# Patient Record
Sex: FEMALE | Race: WHITE | NOT HISPANIC OR LATINO | Employment: OTHER | URBAN - METROPOLITAN AREA
[De-identification: names, ages, dates, MRNs, and addresses within clinical notes are randomized per-mention and may not be internally consistent; named-entity substitution may affect disease eponyms.]

---

## 2019-08-12 ENCOUNTER — TELEPHONE (OUTPATIENT)
Dept: GASTROENTEROLOGY | Facility: CLINIC | Age: 74
End: 2019-08-12

## 2019-09-12 ENCOUNTER — TELEPHONE (OUTPATIENT)
Dept: GASTROENTEROLOGY | Facility: MEDICAL CENTER | Age: 74
End: 2019-09-12

## 2019-09-12 ENCOUNTER — OFFICE VISIT (OUTPATIENT)
Dept: GASTROENTEROLOGY | Facility: CLINIC | Age: 74
End: 2019-09-12
Payer: MEDICARE

## 2019-09-12 VITALS
SYSTOLIC BLOOD PRESSURE: 110 MMHG | HEIGHT: 64 IN | TEMPERATURE: 97.2 F | DIASTOLIC BLOOD PRESSURE: 64 MMHG | WEIGHT: 130 LBS | BODY MASS INDEX: 22.2 KG/M2 | HEART RATE: 82 BPM

## 2019-09-12 DIAGNOSIS — R13.19 ESOPHAGEAL DYSPHAGIA: ICD-10-CM

## 2019-09-12 DIAGNOSIS — R10.13 EPIGASTRIC PAIN: Primary | ICD-10-CM

## 2019-09-12 DIAGNOSIS — R19.7 DIARRHEA, UNSPECIFIED TYPE: ICD-10-CM

## 2019-09-12 DIAGNOSIS — Z12.11 ENCOUNTER FOR SCREENING COLONOSCOPY: Primary | ICD-10-CM

## 2019-09-12 PROCEDURE — 99205 OFFICE O/P NEW HI 60 MIN: CPT | Performed by: INTERNAL MEDICINE

## 2019-09-12 RX ORDER — LEVOTHYROXINE SODIUM 112 UG/1
TABLET ORAL
Refills: 4 | COMMUNITY
Start: 2019-08-27

## 2019-09-12 RX ORDER — POLYETHYLENE GLYCOL 3350 17 G/17G
POWDER, FOR SOLUTION ORAL
Qty: 238 G | Refills: 0 | Status: SHIPPED | OUTPATIENT
Start: 2019-09-12 | End: 2019-09-30 | Stop reason: ALTCHOICE

## 2019-09-12 RX ORDER — POLYETHYLENE GLYCOL 3350 17 G/17G
POWDER, FOR SOLUTION ORAL
Qty: 238 G | Refills: 0 | Status: SHIPPED | OUTPATIENT
Start: 2019-09-12 | End: 2019-09-12 | Stop reason: CLARIF

## 2019-09-12 RX ORDER — TRAZODONE HYDROCHLORIDE 50 MG/1
150 TABLET ORAL
Refills: 0 | COMMUNITY
Start: 2019-07-22

## 2019-09-12 RX ORDER — CLONAZEPAM 1 MG/1
TABLET ORAL
Refills: 0 | Status: ON HOLD | COMMUNITY
Start: 2019-06-19 | End: 2022-08-09 | Stop reason: SDUPTHER

## 2019-09-12 RX ORDER — DOXYCYCLINE HYCLATE 50 MG/1
CAPSULE, GELATIN COATED ORAL
Refills: 3 | COMMUNITY
Start: 2019-08-01 | End: 2020-10-09 | Stop reason: SDUPTHER

## 2019-09-12 NOTE — H&P (VIEW-ONLY)
[facility] - Outpatient Consultation  Daisy Shea 66 y o  female MRN: [id number]  Encounter: [id number]          ASSESSMENT AND PLAN:      1  Epigastric pain  2  Esophageal dysphagia  3  Diarrhea, unspecified type  Patient with history of gastric bypass presents with dysphagia and burning in her stomach  She also has diarrhea and loose, watery stool  This is especially worse when she eats a fatty meal   There is concern of microscopic/collagenous colitis versus pancreatic insufficiency  Will proceed with bidirectionalendoscopy along with check a lipase for pancreatic insufficiency    - EGD; Future  - Colonoscopy; Future  - Lipase; Future  ______________________________________________________________    HPI:  Daisy Shea is a 66y o  year old female who presents to the office for evaluation of dysphagia  This is primarily with solids, she has to cut her food up in small pieces  She tolerates softer foods well  Her stomach will burn if she eats anything with high fat content  She had gastric bypass surgery at [facility]  She is not sure what type of surgery it was  She does have a hoarse voice at the moment  She has been feeling   She had an EGD before and she had a 'very swollen' stomach at the time  She went to [facility] ED about 2 years ago and she was put on IV pain medications and tests showed very inflamed stomach lining  She does have hypothyroidism for which she is on levothyroxine  She has been losing her sense of taste and has decreased appetite  She is limited to what she eats: she eats a lot of yogurts, fresh fruits, some meat (doesn't always like the smell)  She has lost about 14 lbs in the past year  She also gets back pains at times, usually related to not going to the bowel movement for several days  She does have alternating constipation and diarrhea  She will take Miralax to clean herself out    Last colonoscopy was over 8 years ago  REVIEW OF SYSTEMS:    CONSTITUTIONAL: Denies any fever, chills, rigors, + weight loss and appetite  HEENT: No earache or tinnitus  Denies hearing loss or visual disturbances  CARDIOVASCULAR: No chest pain or palpitations  RESPIRATORY: Denies any cough, hemoptysis, shortness of breath or dyspnea on exertion  GASTROINTESTINAL: As noted in the History of Present Illness  GENITOURINARY: No problems with urination  Denies any hematuria or dysuria  NEUROLOGIC: No dizziness or vertigo, denies headaches  MUSCULOSKELETAL: Denies any muscle or joint pain  SKIN: Denies skin rashes or itching  ENDOCRINE: Denies excessive thirst  Denies intolerance to heat or cold  PSYCHOSOCIAL: Denies depression or anxiety  Denies any recent memory loss  Historical Information   History reviewed  No pertinent past medical history  Past Surgical History:   Procedure Laterality Date    GASTRIC BYPASS       Social History   Social History     Substance and Sexual Activity   Alcohol Use Yes    Comment: rare     Social History     Substance and Sexual Activity   Drug Use Never     Social History     Tobacco Use   Smoking Status Former Smoker   Smokeless Tobacco Never Used     History reviewed  No pertinent family history  Meds/Allergies       Current Outpatient Medications:     clonazePAM (KlonoPIN) 1 mg tablet    ferrous gluconate (FERGON) 324 mg tablet    levothyroxine 112 mcg tablet    traZODone (DESYREL) 50 mg tablet    No Known Allergies        Objective     Blood pressure 110/64, pulse 82, temperature (!) 97 2 °F (36 2 °C), temperature source Tympanic, height 5' 4" (1 626 m), weight 59 kg (130 lb)  Body mass index is 22 31 kg/m²  PHYSICAL EXAM:      General Appearance:   Alert, cooperative, no distress   HEENT:   Normocephalic, atraumatic, anicteric       Neck:  Supple, symmetrical, trachea midline   Lungs:   Clear to auscultation bilaterally; no rales, rhonchi or wheezing; respirations unlabored    Heart[de-identified]   Regular rate and rhythm; no murmur, rub, or gallop  Abdomen:   Soft, non-tender, non-distended; normal bowel sounds; no masses, no organomegaly    Genitalia:   Deferred    Rectal:   Deferred    Extremities:  No cyanosis, clubbing or edema    Pulses:  2+ and symmetric    Skin:  No jaundice, rashes, or lesions    Lymph nodes:  No palpable cervical lymphadenopathy        Lab Results:   No visits with results within 1 Day(s) from this visit  Latest known visit with results is:   No results found for any previous visit  Radiology Results:   No results found

## 2019-09-12 NOTE — PROGRESS NOTES
[facility] - Outpatient Consultation  Joel Paz 66 y o  female MRN: [id number]  Encounter: [id number]          ASSESSMENT AND PLAN:      1  Epigastric pain  2  Esophageal dysphagia  3  Diarrhea, unspecified type  Patient with history of gastric bypass presents with dysphagia and burning in her stomach  She also has diarrhea and loose, watery stool  This is especially worse when she eats a fatty meal   There is concern of microscopic/collagenous colitis versus pancreatic insufficiency  Will proceed with bidirectionalendoscopy along with check a lipase for pancreatic insufficiency    - EGD; Future  - Colonoscopy; Future  - Lipase; Future  ______________________________________________________________    HPI:  Joel Paz is a 66y o  year old female who presents to the office for evaluation of dysphagia  This is primarily with solids, she has to cut her food up in small pieces  She tolerates softer foods well  Her stomach will burn if she eats anything with high fat content  She had gastric bypass surgery at [facility]  She is not sure what type of surgery it was  She does have a hoarse voice at the moment  She has been feeling   She had an EGD before and she had a 'very swollen' stomach at the time  She went to [facility] ED about 2 years ago and she was put on IV pain medications and tests showed very inflamed stomach lining  She does have hypothyroidism for which she is on levothyroxine  She has been losing her sense of taste and has decreased appetite  She is limited to what she eats: she eats a lot of yogurts, fresh fruits, some meat (doesn't always like the smell)  She has lost about 14 lbs in the past year  She also gets back pains at times, usually related to not going to the bowel movement for several days  She does have alternating constipation and diarrhea  She will take Miralax to clean herself out    Last colonoscopy was over 8 years ago  REVIEW OF SYSTEMS:    CONSTITUTIONAL: Denies any fever, chills, rigors, + weight loss and appetite  HEENT: No earache or tinnitus  Denies hearing loss or visual disturbances  CARDIOVASCULAR: No chest pain or palpitations  RESPIRATORY: Denies any cough, hemoptysis, shortness of breath or dyspnea on exertion  GASTROINTESTINAL: As noted in the History of Present Illness  GENITOURINARY: No problems with urination  Denies any hematuria or dysuria  NEUROLOGIC: No dizziness or vertigo, denies headaches  MUSCULOSKELETAL: Denies any muscle or joint pain  SKIN: Denies skin rashes or itching  ENDOCRINE: Denies excessive thirst  Denies intolerance to heat or cold  PSYCHOSOCIAL: Denies depression or anxiety  Denies any recent memory loss  Historical Information   History reviewed  No pertinent past medical history  Past Surgical History:   Procedure Laterality Date    GASTRIC BYPASS       Social History   Social History     Substance and Sexual Activity   Alcohol Use Yes    Comment: rare     Social History     Substance and Sexual Activity   Drug Use Never     Social History     Tobacco Use   Smoking Status Former Smoker   Smokeless Tobacco Never Used     History reviewed  No pertinent family history  Meds/Allergies       Current Outpatient Medications:     clonazePAM (KlonoPIN) 1 mg tablet    ferrous gluconate (FERGON) 324 mg tablet    levothyroxine 112 mcg tablet    traZODone (DESYREL) 50 mg tablet    No Known Allergies        Objective     Blood pressure 110/64, pulse 82, temperature (!) 97 2 °F (36 2 °C), temperature source Tympanic, height 5' 4" (1 626 m), weight 59 kg (130 lb)  Body mass index is 22 31 kg/m²  PHYSICAL EXAM:      General Appearance:   Alert, cooperative, no distress   HEENT:   Normocephalic, atraumatic, anicteric       Neck:  Supple, symmetrical, trachea midline   Lungs:   Clear to auscultation bilaterally; no rales, rhonchi or wheezing; respirations unlabored    Heart[de-identified]   Regular rate and rhythm; no murmur, rub, or gallop  Abdomen:   Soft, non-tender, non-distended; normal bowel sounds; no masses, no organomegaly    Genitalia:   Deferred    Rectal:   Deferred    Extremities:  No cyanosis, clubbing or edema    Pulses:  2+ and symmetric    Skin:  No jaundice, rashes, or lesions    Lymph nodes:  No palpable cervical lymphadenopathy        Lab Results:   No visits with results within 1 Day(s) from this visit  Latest known visit with results is:   No results found for any previous visit  Radiology Results:   No results found

## 2019-09-12 NOTE — PATIENT INSTRUCTIONS
Patient scheduled 9/30/19 w/Dr Cary Vo at [facility] for EGD/colonoscopy  Miralax/Dulcolax instructions given to patient during OV  Pt is to f/u isabellan   Clarence Hurt

## 2019-09-12 NOTE — TELEPHONE ENCOUNTER
Roger Fontanez from [employer] called to get a clarification on medication polyethylene   Roger Fontanez can be reached at [phone]

## 2019-09-16 ENCOUNTER — ANESTHESIA EVENT (OUTPATIENT)
Dept: GASTROENTEROLOGY | Facility: AMBULARY SURGERY CENTER | Age: 74
End: 2019-09-16

## 2019-09-30 ENCOUNTER — HOSPITAL ENCOUNTER (OUTPATIENT)
Dept: GASTROENTEROLOGY | Facility: AMBULARY SURGERY CENTER | Age: 74
Setting detail: OUTPATIENT SURGERY
Discharge: HOME | End: 2019-09-30
Attending: INTERNAL MEDICINE | Admitting: INTERNAL MEDICINE
Payer: MEDICARE

## 2019-09-30 ENCOUNTER — ANESTHESIA (OUTPATIENT)
Dept: GASTROENTEROLOGY | Facility: AMBULARY SURGERY CENTER | Age: 74
End: 2019-09-30

## 2019-09-30 VITALS
DIASTOLIC BLOOD PRESSURE: 61 MMHG | BODY MASS INDEX: 22.31 KG/M2 | OXYGEN SATURATION: 99 % | HEART RATE: 74 BPM | WEIGHT: 130 LBS | RESPIRATION RATE: 18 BRPM | TEMPERATURE: 98.5 F | SYSTOLIC BLOOD PRESSURE: 134 MMHG

## 2019-09-30 DIAGNOSIS — R19.7 DIARRHEA, UNSPECIFIED TYPE: ICD-10-CM

## 2019-09-30 DIAGNOSIS — R13.19 ESOPHAGEAL DYSPHAGIA: ICD-10-CM

## 2019-09-30 DIAGNOSIS — K27.9 PEPTIC ULCER DISEASE: Primary | ICD-10-CM

## 2019-09-30 DIAGNOSIS — R10.13 EPIGASTRIC PAIN: ICD-10-CM

## 2019-09-30 DIAGNOSIS — Z98.84 HISTORY OF GASTRIC BYPASS: ICD-10-CM

## 2019-09-30 DIAGNOSIS — K27.9 PEPTIC ULCER DISEASE: ICD-10-CM

## 2019-09-30 PROCEDURE — 1124F ACP DISCUSS-NO DSCNMKR DOCD: CPT | Performed by: INTERNAL MEDICINE

## 2019-09-30 PROCEDURE — 88305 TISSUE EXAM BY PATHOLOGIST: CPT | Performed by: PATHOLOGY

## 2019-09-30 PROCEDURE — 45380 COLONOSCOPY AND BIOPSY: CPT | Performed by: INTERNAL MEDICINE

## 2019-09-30 PROCEDURE — 43235 EGD DIAGNOSTIC BRUSH WASH: CPT | Performed by: INTERNAL MEDICINE

## 2019-09-30 RX ORDER — PROPOFOL 10 MG/ML
INJECTION, EMULSION INTRAVENOUS AS NEEDED
Status: DISCONTINUED | OUTPATIENT
Start: 2019-09-30 | End: 2019-09-30 | Stop reason: SURG

## 2019-09-30 RX ORDER — PANTOPRAZOLE SODIUM 40 MG/1
40 TABLET, DELAYED RELEASE ORAL DAILY
Qty: 60 TABLET | Refills: 2 | Status: SHIPPED | OUTPATIENT
Start: 2019-09-30 | End: 2019-09-30 | Stop reason: SDUPTHER

## 2019-09-30 RX ORDER — SODIUM CHLORIDE, SODIUM LACTATE, POTASSIUM CHLORIDE, CALCIUM CHLORIDE 600; 310; 30; 20 MG/100ML; MG/100ML; MG/100ML; MG/100ML
INJECTION, SOLUTION INTRAVENOUS CONTINUOUS PRN
Status: DISCONTINUED | OUTPATIENT
Start: 2019-09-30 | End: 2019-09-30 | Stop reason: SURG

## 2019-09-30 RX ORDER — SUCRALFATE ORAL 1 G/10ML
1 SUSPENSION ORAL 4 TIMES DAILY
Qty: 420 ML | Refills: 0 | Status: SHIPPED | OUTPATIENT
Start: 2019-09-30 | End: 2020-11-20 | Stop reason: SDUPTHER

## 2019-09-30 RX ORDER — DIPHENHYDRAMINE HYDROCHLORIDE 50 MG/ML
12.5 INJECTION INTRAMUSCULAR; INTRAVENOUS ONCE AS NEEDED
Status: DISCONTINUED | OUTPATIENT
Start: 2019-09-30 | End: 2019-10-04 | Stop reason: HOSPADM

## 2019-09-30 RX ORDER — LIDOCAINE HYDROCHLORIDE 10 MG/ML
INJECTION, SOLUTION INFILTRATION; PERINEURAL AS NEEDED
Status: DISCONTINUED | OUTPATIENT
Start: 2019-09-30 | End: 2019-09-30 | Stop reason: SURG

## 2019-09-30 RX ORDER — ONDANSETRON 2 MG/ML
4 INJECTION INTRAMUSCULAR; INTRAVENOUS ONCE AS NEEDED
Status: DISCONTINUED | OUTPATIENT
Start: 2019-09-30 | End: 2019-10-04 | Stop reason: HOSPADM

## 2019-09-30 RX ORDER — LIDOCAINE HYDROCHLORIDE 10 MG/ML
0.5 INJECTION, SOLUTION EPIDURAL; INFILTRATION; INTRACAUDAL; PERINEURAL ONCE AS NEEDED
Status: DISCONTINUED | OUTPATIENT
Start: 2019-09-30 | End: 2019-10-04 | Stop reason: HOSPADM

## 2019-09-30 RX ORDER — METOCLOPRAMIDE HYDROCHLORIDE 5 MG/ML
10 INJECTION INTRAMUSCULAR; INTRAVENOUS ONCE AS NEEDED
Status: DISCONTINUED | OUTPATIENT
Start: 2019-09-30 | End: 2019-10-04 | Stop reason: HOSPADM

## 2019-09-30 RX ADMIN — PROPOFOL 100 MG: 10 INJECTION, EMULSION INTRAVENOUS at 10:29

## 2019-09-30 RX ADMIN — SODIUM CHLORIDE, SODIUM LACTATE, POTASSIUM CHLORIDE, AND CALCIUM CHLORIDE: .6; .31; .03; .02 INJECTION, SOLUTION INTRAVENOUS at 10:22

## 2019-09-30 RX ADMIN — Medication 40 MG: at 10:50

## 2019-09-30 RX ADMIN — PROPOFOL 25 MG: 10 INJECTION, EMULSION INTRAVENOUS at 10:36

## 2019-09-30 RX ADMIN — PROPOFOL 25 MG: 10 INJECTION, EMULSION INTRAVENOUS at 10:32

## 2019-09-30 RX ADMIN — PROPOFOL 25 MG: 10 INJECTION, EMULSION INTRAVENOUS at 10:45

## 2019-09-30 RX ADMIN — LIDOCAINE HYDROCHLORIDE 80 MG: 10 INJECTION, SOLUTION INFILTRATION; PERINEURAL at 10:29

## 2019-09-30 NOTE — INTERVAL H&P NOTE
H&P reviewed  After examining the patient I find no changes in the patients condition since the H&P had been written      Vitals:    09/30/19 0948   BP: 121/60   Pulse: 71   Resp: 18   Temp: 98 4 °F (36 9 °C)   SpO2: 98%

## 2019-09-30 NOTE — ANESTHESIA PREPROCEDURE EVALUATION
Review of Systems/Medical History  Patient summary reviewed  Chart reviewed  No history of anesthetic complications     Cardiovascular  Negative cardio ROS    Pulmonary  Smoker ex-smoker  ,        GI/Hepatic  Dysphagia,   Bariatric surgery,        Negative  ROS        Endo/Other  Negative endo/other ROS      GYN  Negative gynecology ROS          Hematology  Negative hematology ROS      Musculoskeletal  Negative musculoskeletal ROS        Neurology  Negative neurology ROS      Psychology   Negative psychology ROS              Physical Exam    Airway    Mallampati score: II  TM Distance: >3 FB  Neck ROM: full     Dental   No notable dental hx     Cardiovascular  Comment: Negative ROS, Rhythm: regular, Rate: normal, Cardiovascular exam normal    Pulmonary  Pulmonary exam normal Breath sounds clear to auscultation,     Other Findings        Anesthesia Plan  ASA Score- 2     Anesthesia Type- IV sedation with anesthesia with ASA Monitors  Additional Monitors:   Airway Plan:         Plan Factors-    Induction- intravenous  Postoperative Plan-     Informed Consent- Anesthetic plan and risks discussed with patient  I personally reviewed this patient with the CRNA  Discussed and agreed on the Anesthesia Plan with the CRNA  Frieda Diaz MD, have personally seen and evaluated the patient prior to anesthetic care  I have reviewed the pre-anesthetic record, and other medical records if appropriate to the anesthetic care  If a CRNA is involved in the case, I have reviewed the CRNA assessment, if present, and agree  Risks/benefits and alternatives discussed with patient including possible PONV, sore throat, and possibility of rare anesthetic and surgical emergencies

## 2019-09-30 NOTE — PROGRESS NOTES
Advised patient that he needs a ride home after procedure today due to anesthesia  Patient aware,  en route  Patient stated that ride is here and is taking him home  Ride refusing to come up to floor  Patient aware of implications

## 2019-10-01 RX ORDER — PANTOPRAZOLE SODIUM 40 MG/1
TABLET, DELAYED RELEASE ORAL
Qty: 90 TABLET | Refills: 2 | Status: SHIPPED | OUTPATIENT
Start: 2019-10-01 | End: 2020-11-13 | Stop reason: SDUPTHER

## 2020-10-09 DIAGNOSIS — E61.1 IRON DEFICIENCY: Primary | ICD-10-CM

## 2020-10-09 RX ORDER — DOXYCYCLINE HYCLATE 50 MG/1
324 CAPSULE, GELATIN COATED ORAL
Qty: 100 TABLET | Refills: 3 | Status: SHIPPED | OUTPATIENT
Start: 2020-10-09 | End: 2021-09-23 | Stop reason: SDUPTHER

## 2020-10-19 ENCOUNTER — TELEPHONE (OUTPATIENT)
Dept: FAMILY MEDICINE CLINIC | Facility: CLINIC | Age: 75
End: 2020-10-19

## 2020-10-19 DIAGNOSIS — M79.7 FIBROMYALGIA: Primary | ICD-10-CM

## 2020-10-20 ENCOUNTER — TELEPHONE (OUTPATIENT)
Dept: FAMILY MEDICINE CLINIC | Facility: CLINIC | Age: 75
End: 2020-10-20

## 2020-10-20 DIAGNOSIS — Z12.31 ENCOUNTER FOR SCREENING MAMMOGRAM FOR MALIGNANT NEOPLASM OF BREAST: Primary | ICD-10-CM

## 2020-11-13 ENCOUNTER — OFFICE VISIT (OUTPATIENT)
Dept: FAMILY MEDICINE CLINIC | Facility: CLINIC | Age: 75
End: 2020-11-13
Payer: MEDICARE

## 2020-11-13 VITALS
WEIGHT: 132 LBS | OXYGEN SATURATION: 97 % | DIASTOLIC BLOOD PRESSURE: 70 MMHG | HEIGHT: 65 IN | BODY MASS INDEX: 21.99 KG/M2 | TEMPERATURE: 98.4 F | HEART RATE: 88 BPM | RESPIRATION RATE: 16 BRPM | SYSTOLIC BLOOD PRESSURE: 122 MMHG

## 2020-11-13 DIAGNOSIS — K21.9 GASTROESOPHAGEAL REFLUX DISEASE WITHOUT ESOPHAGITIS: ICD-10-CM

## 2020-11-13 DIAGNOSIS — R10.13 EPIGASTRIC PAIN: ICD-10-CM

## 2020-11-13 DIAGNOSIS — F41.8 MIXED ANXIETY DEPRESSIVE DISORDER: ICD-10-CM

## 2020-11-13 DIAGNOSIS — Z98.84 HISTORY OF GASTRIC BYPASS: ICD-10-CM

## 2020-11-13 DIAGNOSIS — M79.7 FIBROMYALGIA, PRIMARY: ICD-10-CM

## 2020-11-13 DIAGNOSIS — R13.19 ESOPHAGEAL DYSPHAGIA: ICD-10-CM

## 2020-11-13 DIAGNOSIS — K27.9 PEPTIC ULCER DISEASE: ICD-10-CM

## 2020-11-13 DIAGNOSIS — R20.0 NUMBNESS OF FOOT: ICD-10-CM

## 2020-11-13 DIAGNOSIS — E03.8 OTHER SPECIFIED HYPOTHYROIDISM: Primary | ICD-10-CM

## 2020-11-13 DIAGNOSIS — E61.1 IRON DEFICIENCY: ICD-10-CM

## 2020-11-13 PROCEDURE — 99214 OFFICE O/P EST MOD 30 MIN: CPT | Performed by: INTERNAL MEDICINE

## 2020-11-13 RX ORDER — PANTOPRAZOLE SODIUM 40 MG/1
40 TABLET, DELAYED RELEASE ORAL DAILY
Qty: 90 TABLET | Refills: 2 | Status: SHIPPED | OUTPATIENT
Start: 2020-11-13 | End: 2022-05-28

## 2020-11-13 RX ORDER — GABAPENTIN 300 MG/1
CAPSULE ORAL
Qty: 60 CAPSULE | Refills: 4 | Status: SHIPPED | OUTPATIENT
Start: 2020-11-13 | End: 2021-03-29 | Stop reason: ALTCHOICE

## 2020-11-16 LAB
ALBUMIN SERPL-MCNC: 4.2 G/DL (ref 3.6–5.1)
ALBUMIN/GLOB SERPL: 1.4 (CALC) (ref 1–2.5)
ALP SERPL-CCNC: 45 U/L (ref 37–153)
ALT SERPL-CCNC: 21 U/L (ref 6–29)
AST SERPL-CCNC: 27 U/L (ref 10–35)
BASOPHILS # BLD AUTO: 42 CELLS/UL (ref 0–200)
BASOPHILS NFR BLD AUTO: 0.8 %
BILIRUB SERPL-MCNC: 0.7 MG/DL (ref 0.2–1.2)
BUN SERPL-MCNC: 17 MG/DL (ref 7–25)
BUN/CREAT SERPL: NORMAL (CALC) (ref 6–22)
CALCIUM SERPL-MCNC: 9.6 MG/DL (ref 8.6–10.4)
CHLORIDE SERPL-SCNC: 103 MMOL/L (ref 98–110)
CHOLEST SERPL-MCNC: 165 MG/DL
CHOLEST/HDLC SERPL: 2.6 (CALC)
CO2 SERPL-SCNC: 30 MMOL/L (ref 20–32)
CREAT SERPL-MCNC: 0.65 MG/DL (ref 0.6–0.93)
CRP SERPL-MCNC: 0.7 MG/L
EOSINOPHIL # BLD AUTO: 333 CELLS/UL (ref 15–500)
EOSINOPHIL NFR BLD AUTO: 6.4 %
ERYTHROCYTE [DISTWIDTH] IN BLOOD BY AUTOMATED COUNT: 12.6 % (ref 11–15)
ERYTHROCYTE [SEDIMENTATION RATE] IN BLOOD BY WESTERGREN METHOD: 6 MM/H
FERRITIN SERPL-MCNC: 89 NG/ML (ref 16–288)
GLOBULIN SER CALC-MCNC: 3 G/DL (CALC) (ref 1.9–3.7)
GLUCOSE SERPL-MCNC: 88 MG/DL (ref 65–99)
HCT VFR BLD AUTO: 38.4 % (ref 35–45)
HDLC SERPL-MCNC: 63 MG/DL
HGB BLD-MCNC: 12.9 G/DL (ref 11.7–15.5)
LDLC SERPL CALC-MCNC: 86 MG/DL (CALC)
LYMPHOCYTES # BLD AUTO: 2127 CELLS/UL (ref 850–3900)
LYMPHOCYTES NFR BLD AUTO: 40.9 %
MCH RBC QN AUTO: 33.6 PG (ref 27–33)
MCHC RBC AUTO-ENTMCNC: 33.6 G/DL (ref 32–36)
MCV RBC AUTO: 100 FL (ref 80–100)
MONOCYTES # BLD AUTO: 894 CELLS/UL (ref 200–950)
MONOCYTES NFR BLD AUTO: 17.2 %
NEUTROPHILS # BLD AUTO: 1804 CELLS/UL (ref 1500–7800)
NEUTROPHILS NFR BLD AUTO: 34.7 %
NONHDLC SERPL-MCNC: 102 MG/DL (CALC)
PLATELET # BLD AUTO: 396 THOUSAND/UL (ref 140–400)
PMV BLD REES-ECKER: 10.4 FL (ref 7.5–12.5)
POTASSIUM SERPL-SCNC: 4.5 MMOL/L (ref 3.5–5.3)
PROT SERPL-MCNC: 7.2 G/DL (ref 6.1–8.1)
RBC # BLD AUTO: 3.84 MILLION/UL (ref 3.8–5.1)
SL AMB EGFR AFRICAN AMERICAN: 99 ML/MIN/1.73M2
SL AMB EGFR NON AFRICAN AMERICAN: 86 ML/MIN/1.73M2
SODIUM SERPL-SCNC: 139 MMOL/L (ref 135–146)
TRIGL SERPL-MCNC: 69 MG/DL
TSH SERPL-ACNC: 0.66 MIU/L (ref 0.4–4.5)
WBC # BLD AUTO: 5.2 THOUSAND/UL (ref 3.8–10.8)

## 2020-11-18 ENCOUNTER — TELEPHONE (OUTPATIENT)
Dept: FAMILY MEDICINE CLINIC | Facility: CLINIC | Age: 75
End: 2020-11-18

## 2020-11-19 ENCOUNTER — TELEPHONE (OUTPATIENT)
Dept: OTHER | Facility: OTHER | Age: 75
End: 2020-11-19

## 2020-11-20 ENCOUNTER — TELEPHONE (OUTPATIENT)
Dept: FAMILY MEDICINE CLINIC | Facility: CLINIC | Age: 75
End: 2020-11-20

## 2020-11-20 DIAGNOSIS — K27.9 PEPTIC ULCER DISEASE: ICD-10-CM

## 2020-11-20 DIAGNOSIS — R10.13 EPIGASTRIC PAIN: ICD-10-CM

## 2020-11-20 DIAGNOSIS — Z98.84 HISTORY OF GASTRIC BYPASS: ICD-10-CM

## 2020-11-20 DIAGNOSIS — R13.19 ESOPHAGEAL DYSPHAGIA: ICD-10-CM

## 2020-11-20 RX ORDER — SUCRALFATE ORAL 1 G/10ML
1 SUSPENSION ORAL 4 TIMES DAILY
Qty: 420 ML | Refills: 3 | Status: SHIPPED | OUTPATIENT
Start: 2020-11-20 | End: 2022-05-28

## 2020-12-22 ENCOUNTER — OFFICE VISIT (OUTPATIENT)
Dept: GASTROENTEROLOGY | Facility: CLINIC | Age: 75
End: 2020-12-22
Payer: MEDICARE

## 2020-12-22 VITALS
TEMPERATURE: 98 F | BODY MASS INDEX: 21.66 KG/M2 | DIASTOLIC BLOOD PRESSURE: 68 MMHG | WEIGHT: 130 LBS | HEIGHT: 65 IN | SYSTOLIC BLOOD PRESSURE: 120 MMHG

## 2020-12-22 DIAGNOSIS — K21.9 GASTROESOPHAGEAL REFLUX DISEASE WITHOUT ESOPHAGITIS: ICD-10-CM

## 2020-12-22 DIAGNOSIS — R47.02 DYSPHASIA: ICD-10-CM

## 2020-12-22 DIAGNOSIS — R10.13 EPIGASTRIC PAIN: Primary | ICD-10-CM

## 2020-12-22 PROCEDURE — 99214 OFFICE O/P EST MOD 30 MIN: CPT | Performed by: PHYSICIAN ASSISTANT

## 2021-01-27 DIAGNOSIS — Z23 ENCOUNTER FOR IMMUNIZATION: ICD-10-CM

## 2021-02-04 ENCOUNTER — IMMUNIZATIONS (OUTPATIENT)
Dept: FAMILY MEDICINE CLINIC | Facility: HOSPITAL | Age: 76
End: 2021-02-04

## 2021-02-04 DIAGNOSIS — Z23 ENCOUNTER FOR IMMUNIZATION: Primary | ICD-10-CM

## 2021-02-04 PROCEDURE — 0001A SARS-COV-2 / COVID-19 MRNA VACCINE (PFIZER-BIONTECH) 30 MCG: CPT

## 2021-02-04 PROCEDURE — 91300 SARS-COV-2 / COVID-19 MRNA VACCINE (PFIZER-BIONTECH) 30 MCG: CPT

## 2021-02-26 ENCOUNTER — IMMUNIZATIONS (OUTPATIENT)
Dept: FAMILY MEDICINE CLINIC | Facility: HOSPITAL | Age: 76
End: 2021-02-26

## 2021-02-26 DIAGNOSIS — Z23 ENCOUNTER FOR IMMUNIZATION: Primary | ICD-10-CM

## 2021-02-26 PROCEDURE — 0002A SARS-COV-2 / COVID-19 MRNA VACCINE (PFIZER-BIONTECH) 30 MCG: CPT

## 2021-02-26 PROCEDURE — 91300 SARS-COV-2 / COVID-19 MRNA VACCINE (PFIZER-BIONTECH) 30 MCG: CPT

## 2021-03-03 ENCOUNTER — APPOINTMENT (EMERGENCY)
Dept: RADIOLOGY | Facility: HOSPITAL | Age: 76
End: 2021-03-03
Payer: COMMERCIAL

## 2021-03-03 ENCOUNTER — HOSPITAL ENCOUNTER (EMERGENCY)
Facility: HOSPITAL | Age: 76
Discharge: HOME | End: 2021-03-03
Attending: EMERGENCY MEDICINE | Admitting: EMERGENCY MEDICINE
Payer: COMMERCIAL

## 2021-03-03 ENCOUNTER — TELEMEDICINE (OUTPATIENT)
Dept: FAMILY MEDICINE CLINIC | Facility: CLINIC | Age: 76
End: 2021-03-03
Payer: COMMERCIAL

## 2021-03-03 VITALS
SYSTOLIC BLOOD PRESSURE: 132 MMHG | HEART RATE: 88 BPM | DIASTOLIC BLOOD PRESSURE: 57 MMHG | RESPIRATION RATE: 22 BRPM | OXYGEN SATURATION: 97 % | TEMPERATURE: 98.5 F

## 2021-03-03 VITALS — WEIGHT: 131 LBS | HEIGHT: 65 IN | BODY MASS INDEX: 21.83 KG/M2

## 2021-03-03 DIAGNOSIS — R06.00 DYSPNEA: Primary | ICD-10-CM

## 2021-03-03 DIAGNOSIS — R05.9 COUGH: ICD-10-CM

## 2021-03-03 DIAGNOSIS — U07.1 COVID-19: Primary | ICD-10-CM

## 2021-03-03 LAB
ALBUMIN SERPL BCP-MCNC: 3.6 G/DL (ref 3.5–5)
ALP SERPL-CCNC: 79 U/L (ref 46–116)
ALT SERPL W P-5'-P-CCNC: 29 U/L (ref 12–78)
ANION GAP SERPL CALCULATED.3IONS-SCNC: 9 MMOL/L (ref 4–13)
AST SERPL W P-5'-P-CCNC: 29 U/L (ref 5–45)
BASOPHILS # BLD AUTO: 0.03 THOUSANDS/ΜL (ref 0–0.1)
BASOPHILS NFR BLD AUTO: 0 % (ref 0–1)
BILIRUB SERPL-MCNC: 0.44 MG/DL (ref 0.2–1)
BUN SERPL-MCNC: 15 MG/DL (ref 5–25)
CALCIUM SERPL-MCNC: 9.1 MG/DL (ref 8.3–10.1)
CHLORIDE SERPL-SCNC: 100 MMOL/L (ref 100–108)
CO2 SERPL-SCNC: 27 MMOL/L (ref 21–32)
CREAT SERPL-MCNC: 0.79 MG/DL (ref 0.6–1.3)
D DIMER PPP FEU-MCNC: 0.6 UG/ML FEU
EOSINOPHIL # BLD AUTO: 0.39 THOUSAND/ΜL (ref 0–0.61)
EOSINOPHIL NFR BLD AUTO: 4 % (ref 0–6)
ERYTHROCYTE [DISTWIDTH] IN BLOOD BY AUTOMATED COUNT: 13.1 % (ref 11.6–15.1)
FLUAV RNA RESP QL NAA+PROBE: NEGATIVE
FLUBV RNA RESP QL NAA+PROBE: NEGATIVE
GFR SERPL CREATININE-BSD FRML MDRD: 72 ML/MIN/1.73SQ M
GLUCOSE SERPL-MCNC: 95 MG/DL (ref 65–140)
HCT VFR BLD AUTO: 38.4 % (ref 34.8–46.1)
HGB BLD-MCNC: 12.7 G/DL (ref 11.5–15.4)
IMM GRANULOCYTES # BLD AUTO: 0.03 THOUSAND/UL (ref 0–0.2)
IMM GRANULOCYTES NFR BLD AUTO: 0 % (ref 0–2)
LIPASE SERPL-CCNC: 138 U/L (ref 73–393)
LYMPHOCYTES # BLD AUTO: 2.68 THOUSANDS/ΜL (ref 0.6–4.47)
LYMPHOCYTES NFR BLD AUTO: 27 % (ref 14–44)
MCH RBC QN AUTO: 34 PG (ref 26.8–34.3)
MCHC RBC AUTO-ENTMCNC: 33.1 G/DL (ref 31.4–37.4)
MCV RBC AUTO: 103 FL (ref 82–98)
MONOCYTES # BLD AUTO: 1.3 THOUSAND/ΜL (ref 0.17–1.22)
MONOCYTES NFR BLD AUTO: 13 % (ref 4–12)
NEUTROPHILS # BLD AUTO: 5.53 THOUSANDS/ΜL (ref 1.85–7.62)
NEUTS SEG NFR BLD AUTO: 56 % (ref 43–75)
NRBC BLD AUTO-RTO: 0 /100 WBCS
PLATELET # BLD AUTO: 415 THOUSANDS/UL (ref 149–390)
PMV BLD AUTO: 9.6 FL (ref 8.9–12.7)
POTASSIUM SERPL-SCNC: 4.3 MMOL/L (ref 3.5–5.3)
PROT SERPL-MCNC: 7.5 G/DL (ref 6.4–8.2)
RBC # BLD AUTO: 3.73 MILLION/UL (ref 3.81–5.12)
RSV RNA RESP QL NAA+PROBE: NEGATIVE
SARS-COV-2 RNA RESP QL NAA+PROBE: NEGATIVE
SODIUM SERPL-SCNC: 136 MMOL/L (ref 136–145)
TROPONIN I SERPL-MCNC: <0.02 NG/ML
TROPONIN I SERPL-MCNC: <0.02 NG/ML
WBC # BLD AUTO: 9.96 THOUSAND/UL (ref 4.31–10.16)

## 2021-03-03 PROCEDURE — 0241U HB NFCT DS VIR RESP RNA 4 TRGT: CPT | Performed by: EMERGENCY MEDICINE

## 2021-03-03 PROCEDURE — 93005 ELECTROCARDIOGRAM TRACING: CPT

## 2021-03-03 PROCEDURE — 85379 FIBRIN DEGRADATION QUANT: CPT | Performed by: EMERGENCY MEDICINE

## 2021-03-03 PROCEDURE — 99213 OFFICE O/P EST LOW 20 MIN: CPT | Performed by: INTERNAL MEDICINE

## 2021-03-03 PROCEDURE — 99285 EMERGENCY DEPT VISIT HI MDM: CPT

## 2021-03-03 PROCEDURE — 99285 EMERGENCY DEPT VISIT HI MDM: CPT | Performed by: EMERGENCY MEDICINE

## 2021-03-03 PROCEDURE — 85025 COMPLETE CBC W/AUTO DIFF WBC: CPT | Performed by: EMERGENCY MEDICINE

## 2021-03-03 PROCEDURE — 36415 COLL VENOUS BLD VENIPUNCTURE: CPT

## 2021-03-03 PROCEDURE — 96374 THER/PROPH/DIAG INJ IV PUSH: CPT

## 2021-03-03 PROCEDURE — 71045 X-RAY EXAM CHEST 1 VIEW: CPT

## 2021-03-03 PROCEDURE — 80053 COMPREHEN METABOLIC PANEL: CPT | Performed by: EMERGENCY MEDICINE

## 2021-03-03 PROCEDURE — 83690 ASSAY OF LIPASE: CPT | Performed by: EMERGENCY MEDICINE

## 2021-03-03 PROCEDURE — 84484 ASSAY OF TROPONIN QUANT: CPT | Performed by: EMERGENCY MEDICINE

## 2021-03-03 RX ORDER — AZITHROMYCIN 250 MG/1
TABLET, FILM COATED ORAL
Qty: 6 TABLET | Refills: 0 | Status: SHIPPED | OUTPATIENT
Start: 2021-03-03 | End: 2021-03-07

## 2021-03-03 RX ADMIN — MORPHINE SULFATE 2 MG: 2 INJECTION, SOLUTION INTRAMUSCULAR; INTRAVENOUS at 14:33

## 2021-03-03 NOTE — ASSESSMENT & PLAN NOTE
Symptoms due to COVID-19 vaccine 2nd dose versus true COVID-19 infection  Since patient is feeling really tired and sick as well as chest pain and dyspnea,   I have advised patient to go to nearest emergency room  Patient will go there now  She declined for any ambulance services

## 2021-03-03 NOTE — PROGRESS NOTES
Virtual Regular Visit      Assessment/Plan:    Problem List Items Addressed This Visit        Other    COVID-19 - Primary       Symptoms due to COVID-19 vaccine 2nd dose versus true COVID-19 infection  Since patient is feeling really tired and sick as well as chest pain and dyspnea,   I have advised patient to go to nearest emergency room  Patient will go there now  She declined for any ambulance services  Reason for visit is   Chief Complaint   Patient presents with    Cold Like Symptoms     had covid vaccine on 2- has fatigue, alot of chest pain going into back, alot of mucous, cough increases pain in chest(sharp feeling), loss of smell and taste, dry mouth, loss of appetitek, fatigue    Virtual Regular Visit        Encounter provider Davina Torres MD    Provider located at [address] [place] [address] [address] [facility] [id number] [phone]      Recent Visits  No visits were found meeting these conditions  Showing recent visits within past 7 days and meeting all other requirements     Today's Visits  Date Type Provider Dept   03/03/21 Telemedicine Pippa Rivera MD  [facility]   Showing today's visits and meeting all other requirements     Future Appointments  No visits were found meeting these conditions  Showing future appointments within next 150 days and meeting all other requirements        The patient was identified by name and date of birth  Grey Azarparth was informed that this is a telemedicine visit and that the visit is being conducted through telephone  My office door was closed  No one else was in the room  She acknowledged consent and understanding of privacy and security of the video platform  The patient has agreed to participate and understands they can discontinue the visit at any time  Patient is aware this is a billable service  Subjective  Grey Figueroa is a 66 y o  female   Had a virtual visit today due to she is not feeling well  Day before her 2nd COVID-19 vaccine she was not feeling well  However she went ahead and got her 2nd COVID shot  One day after the COVID shot she started feeling fever chills with cough with lot of mucus  She does not know the color of the mucus  She has loss of smell and taste  She is feeling fatigued and tired  No gastrointestinal symptoms  Her chest hurts on and off  No lightheadedness  She is not eating much  HPI     Past Medical History:   Diagnosis Date    Anemia     Anxiety     Bipolar disorder ([address] )     Disease of thyroid gland     Essential hypertension     Essential tremor     Fibromyalgia, primary     GERD (gastroesophageal reflux disease)     Inflammatory polyarthropathy (HCC)     Mammogram abnormal        Past Surgical History:   Procedure Laterality Date    CARPAL TUNNEL RELEASE Bilateral     EGD AND COLONOSCOPY  01/01/2014    GASTRIC BYPASS  07/01/2012    ULNAR NERVE TRANSPOSITION Right        Current Outpatient Medications   Medication Sig Dispense Refill    clonazePAM (KlonoPIN) 1 mg tablet TK 3 TS PO Q NIGHT  0    ferrous gluconate (FERGON) 324 mg tablet Take 1 tablet (324 mg total) by mouth daily with breakfast 100 tablet 3    levothyroxine 112 mcg tablet TK 1 T PO QD  4    pantoprazole (PROTONIX) 40 mg tablet Take 1 tablet (40 mg total) by mouth daily 90 tablet 2    sucralfate (CARAFATE) 1 g/10 mL suspension Take 10 mL (1 g total) by mouth 4 (four) times a day 420 mL 3    traZODone (DESYREL) 50 mg tablet TK 2 TO 3 TS PO Q NIGHT  0    bisacodyl (DULCOLAX) 5 mg EC tablet Take 2 tablets (10 mg total) by mouth once for 1 dose Please take with start of miralax prep as per office instructions  2 tablet 0    gabapentin (NEURONTIN) 300 mg capsule Take 1 pill po Qhs x3 days, then 1 po bid   (Patient not taking: Reported on 12/22/2020) 60 capsule 4     No current facility-administered medications for this visit  No Known Allergies    Review of Systems   Constitutional: Positive for appetite change, chills, fatigue and fever  Negative for diaphoresis  HENT: Negative for congestion, drooling and sinus pain  Eyes: Negative for discharge and itching  Respiratory: Positive for cough and shortness of breath  Negative for chest tightness  Cardiovascular: Positive for chest pain  Negative for palpitations and leg swelling  Gastrointestinal: Negative  Endocrine: Negative for polyphagia and polyuria  Genitourinary: Negative for difficulty urinating, dysuria, frequency and urgency  Skin: Negative for pallor and rash  Allergic/Immunologic: Negative for food allergies  Neurological: Negative for dizziness, seizures, speech difficulty, light-headedness, numbness and headaches  Hematological: Negative for adenopathy  Does not bruise/bleed easily  Psychiatric/Behavioral: Negative for agitation, confusion, decreased concentration, dysphoric mood, sleep disturbance and suicidal ideas  Video Exam    Vitals:    03/03/21 1143   Weight: 59 4 kg (131 lb)   Height: 5' 5" (1 651 m)       Physical Exam  Constitutional:       General: She is not in acute distress  Pulmonary:      Effort: Pulmonary effort is normal    Neurological:      Mental Status: She is alert  Psychiatric:         Thought Content: Thought content normal           I spent 18 minutes directly with the patient during this visit      VIRTUAL VISIT [facility] acknowledges that she has consented to an online visit or consultation  She understands that the online visit is based solely on information provided by her, and that, in the absence of a face-to-face physical evaluation by the physician, the diagnosis she receives is both limited and provisional in terms of accuracy and completeness  This is not intended to replace a full medical face-to-face evaluation by the physician   Emily Edmonds understands and accepts these terms

## 2021-03-03 NOTE — ED PROVIDER NOTES
History  Chief Complaint   Patient presents with    Shortness of Breath     pt c/o worsening sob and generalized cp since covid vaccine on friday  denies fevers  History provided by:  Patient   used: No    Shortness of Breath  Associated symptoms: chest pain and cough    Associated symptoms: no abdominal pain, no diaphoresis, no fever, no headaches, no neck pain, no rash, no vomiting and no wheezing      Patient is a 49-year-old female presenting to emergency department with shortness of breath generalized body aches, chest pain, cough, runny nose and congestion  Chills  No fevers  Started last Friday  She also received vaccine on Saturday for COVID  No nausea vomiting  No diarrhea  No rash  No leg swelling  No calf pain  No history of blood clots  Has had some epigastric abdominal pain, history of stomach ulcer, feels similar  MDM cardiac evaluation, COVID swab, pain meds, re-evaluate      Prior to Admission Medications   Prescriptions Last Dose Informant Patient Reported? Taking?   bisacodyl (DULCOLAX) 5 mg EC tablet   No No   Sig: Take 2 tablets (10 mg total) by mouth once for 1 dose Please take with start of miralax prep as per office instructions  clonazePAM (KlonoPIN) 1 mg tablet  Self Yes No   Sig: TK 3 TS PO Q NIGHT   ferrous gluconate (FERGON) 324 mg tablet  Self No No   Sig: Take 1 tablet (324 mg total) by mouth daily with breakfast   gabapentin (NEURONTIN) 300 mg capsule  Self No No   Sig: Take 1 pill po Qhs x3 days, then 1 po bid     Patient not taking: Reported on 12/22/2020   levothyroxine 112 mcg tablet  Self Yes No   Sig: TK 1 T PO QD   pantoprazole (PROTONIX) 40 mg tablet  Self No No   Sig: Take 1 tablet (40 mg total) by mouth daily   sucralfate (CARAFATE) 1 g/10 mL suspension  Self No No   Sig: Take 10 mL (1 g total) by mouth 4 (four) times a day   traZODone (DESYREL) 50 mg tablet  Self Yes No   Sig: TK 2 TO 3 TS PO Q NIGHT      Facility-Administered Medications: None       Past Medical History:   Diagnosis Date    Anemia     Anxiety     Bipolar disorder ([address] )     Disease of thyroid gland     Essential hypertension     Essential tremor     Fibromyalgia, primary     GERD (gastroesophageal reflux disease)     Inflammatory polyarthropathy (HCC)     Mammogram abnormal        Past Surgical History:   Procedure Laterality Date    CARPAL TUNNEL RELEASE Bilateral     EGD AND COLONOSCOPY  2014    GASTRIC BYPASS  2012    ULNAR NERVE TRANSPOSITION Right        Family History   Problem Relation Age of Onset    No Known Problems Mother     No Known Problems Father      I have reviewed and agree with the history as documented  E-Cigarette/Vaping     E-Cigarette/Vaping Substances     Social History     Tobacco Use    Smoking status: Former Smoker     Quit date:      Years since quittin 1    Smokeless tobacco: Never Used   Substance Use Topics    Alcohol use: Yes     Comment: rare    Drug use: Never       Review of Systems   Constitutional: Positive for chills  Negative for diaphoresis and fever  HENT: Positive for congestion and rhinorrhea  Eyes: Negative for photophobia and redness  Respiratory: Positive for cough and shortness of breath  Negative for wheezing and stridor  Cardiovascular: Positive for chest pain  Negative for palpitations and leg swelling  Gastrointestinal: Negative for abdominal pain, blood in stool, diarrhea, nausea and vomiting  Genitourinary: Negative for dysuria, frequency and urgency  Musculoskeletal: Negative for neck pain and neck stiffness  Skin: Negative for pallor and rash  Neurological: Negative for dizziness, syncope, weakness, light-headedness and headaches  All other systems reviewed and are negative  Physical Exam  Physical Exam  Vitals signs reviewed  Constitutional:       Appearance: She is well-developed  HENT:      Head: Normocephalic and atraumatic     Eyes: Pupils: Pupils are equal, round, and reactive to light  Neck:      Musculoskeletal: Neck supple  Cardiovascular:      Rate and Rhythm: Normal rate and regular rhythm  Heart sounds: Normal heart sounds  Pulmonary:      Effort: Pulmonary effort is normal  No respiratory distress  Breath sounds: Normal breath sounds  Abdominal:      General: Bowel sounds are normal       Palpations: Abdomen is soft  Tenderness: There is no abdominal tenderness  Musculoskeletal: Normal range of motion  Right lower leg: She exhibits no tenderness  No edema  Left lower leg: She exhibits no tenderness  No edema  Skin:     General: Skin is warm and dry  Capillary Refill: Capillary refill takes less than 2 seconds  Neurological:      General: No focal deficit present  Mental Status: She is alert and oriented to person, place, and time           Vital Signs  ED Triage Vitals   Temperature Pulse Respirations Blood Pressure SpO2   03/03/21 1356 03/03/21 1356 03/03/21 1356 03/03/21 1357 03/03/21 1357   98 5 °F (36 9 °C) 75 18 115/71 97 %      Temp Source Heart Rate Source Patient Position - Orthostatic VS BP Location FiO2 (%)   03/03/21 1356 03/03/21 1356 03/03/21 1750 03/03/21 1750 --   Oral Monitor Lying Right arm       Pain Score       03/03/21 1433       8           Vitals:    03/03/21 1530 03/03/21 1730 03/03/21 1750 03/03/21 1800   BP:   132/57 132/57   Pulse: 90 84 86 88   Patient Position - Orthostatic VS:   Lying          Visual Acuity      ED Medications  Medications   morphine injection 2 mg (2 mg Intravenous Given 3/3/21 1433)       Diagnostic Studies  Results Reviewed     Procedure Component Value Units Date/Time    Troponin I [[id number]]  (Normal) Collected: 03/03/21 1730    Lab Status: Final result Specimen: Blood from Arm, Left Updated: 03/03/21 1800     Troponin I <0 02 ng/mL     D-dimer, quantitative [[id number]]  (Abnormal) Collected: 03/03/21 1607    Lab Status: Final result Specimen: Blood Updated: 03/03/21 1622     D-Dimer, Quant 0 60 ug/ml FEU     Lipase [[id number]]  (Normal) Collected: 03/03/21 1402    Lab Status: Final result Specimen: Blood from Arm, Right Updated: 03/03/21 1606     Lipase 138 u/L     COVID19, Influenza A/B, RSV PCR, [facility] [[id number]]  (Normal) Collected: 03/03/21 1419    Lab Status: Final result Specimen: Nares from Nose Updated: 03/03/21 1524     SARS-CoV-2 Negative     INFLUENZA A PCR Negative     INFLUENZA B PCR Negative     RSV PCR Negative    Narrative: This test has been authorized by FDA under an EUA (Emergency Use Assay) for use by authorized laboratories  Clinical caution and judgement should be used with the interpretation of these results with consideration of the clinical impression and other laboratory testing  Testing reported as "Positive" or "Negative" has been proven to be accurate according to standard laboratory validation requirements  All testing is performed with control materials showing appropriate reactivity at standard intervals      Troponin I [[id number]]  (Normal) Collected: 03/03/21 1402    Lab Status: Final result Specimen: Blood from Arm, Right Updated: 03/03/21 1426     Troponin I <0 02 ng/mL     Comprehensive metabolic panel [[id number]] Collected: 03/03/21 1402    Lab Status: Final result Specimen: Blood from Arm, Right Updated: 03/03/21 1423     Sodium 136 mmol/L      Potassium 4 3 mmol/L      Chloride 100 mmol/L      CO2 27 mmol/L      ANION GAP 9 mmol/L      BUN 15 mg/dL      Creatinine 0 79 mg/dL      Glucose 95 mg/dL      Calcium 9 1 mg/dL      AST 29 U/L      ALT 29 U/L      Alkaline Phosphatase 79 U/L      Total Protein 7 5 g/dL      Albumin 3 6 g/dL      Total Bilirubin 0 44 mg/dL      eGFR 72 ml/min/1 73sq m     Narrative:      [place] guidelines for Chronic Kidney Disease (CKD):     Stage 1 with normal or high GFR (GFR > 90 mL/min/1 73 square meters)    Stage 2 Mild CKD (GFR = 60-89 mL/min/1 73 square meters)    Stage 3A Moderate CKD (GFR = 45-59 mL/min/1 73 square meters)    Stage 3B Moderate CKD (GFR = 30-44 mL/min/1 73 square meters)    Stage 4 Severe CKD (GFR = 15-29 mL/min/1 73 square meters)    Stage 5 End Stage CKD (GFR <15 mL/min/1 73 square meters)  Note: GFR calculation is accurate only with a steady state creatinine    CBC and differential [[id number]]  (Abnormal) Collected: 03/03/21 1401    Lab Status: Final result Specimen: Blood from Arm, Right Updated: 03/03/21 1412     WBC 9 96 Thousand/uL      RBC 3 73 Million/uL      Hemoglobin 12 7 g/dL      Hematocrit 38 4 %       fL      MCH 34 0 pg      MCHC 33 1 g/dL      RDW 13 1 %      MPV 9 6 fL      Platelets 967 Thousands/uL      nRBC 0 /100 WBCs      Neutrophils Relative 56 %      Immat GRANS % 0 %      Lymphocytes Relative 27 %      Monocytes Relative 13 %      Eosinophils Relative 4 %      Basophils Relative 0 %      Neutrophils Absolute 5 53 Thousands/µL      Immature Grans Absolute 0 03 Thousand/uL      Lymphocytes Absolute 2 68 Thousands/µL      Monocytes Absolute 1 30 Thousand/µL      Eosinophils Absolute 0 39 Thousand/µL      Basophils Absolute 0 03 Thousands/µL                  XR chest 1 view portable   ED Interpretation by Romulo Mena MD (03/03 1515)   Right lower lobe pneumonia      Final Result by Ketty De Los Santos DO (03/03 1548)      No acute cardiopulmonary disease  Followup dual energy PA and lateral imaging may be obtained for any persistent or worsening symptoms                 Workstation performed: [id number]                    Procedures  Procedures         ED Course  ED Course as of Mar 03 1854   Wed Mar 03, 2021   1439 ECG shows rate of 82, sinus, normal axis, normal QRS, no significant ST or T-wave changes, normal intervals, independently interpreted by me      1623 Negative age adjusted D-dimer   D-Dimer, Quant(!): 0 60   1753 Repeat ECG shows rate of 82, sinus, normal axis, normal QRS, no significant ST or T-wave changes, normal intervals, independently interpreted by me                                               MDM    This is unlikely to be CAD  Patient has viral symptoms  Unlikely to be PE with negative D-dimer  Likely bronchitis or early pneumonia  Will cover with Z-Junior  Return precautions explained  Disposition  Final diagnoses:   Dyspnea   Cough     Time reflects when diagnosis was documented in both MDM as applicable and the Disposition within this note     Time User Action Codes Description Comment    3/3/2021  6:20 PM Doris Waldron Add [R06 00] Dyspnea     3/3/2021  6:20 PM Doris Carmen Add [R05] Cough       ED Disposition     ED Disposition Condition Date/Time Comment    Discharge Stable Wed Mar 3, 2021  6:20 PM Philip Anders discharge to home/self care  Follow-up Information     Follow up With Specialties Details Why Contact Info Additional Information    Ardis Gosselin, MD Internal Medicine In 2 days Re-evaluate [address] [facility] Emergency Department Emergency Medicine  As needed, If symptoms worsen [address] [facility] [id number] [place] [address] Emergency Department, [address], [place], [place], [address]          Discharge Medication List as of 3/3/2021  6:21 PM      START taking these medications    Details   azithromycin (ZITHROMAX) 250 mg tablet Take 2 tablets today then 1 tablet daily x 4 days, Normal         CONTINUE these medications which have NOT CHANGED    Details   bisacodyl (DULCOLAX) 5 mg EC tablet Take 2 tablets (10 mg total) by mouth once for 1 dose Please take with start of miralax prep as per office instructions  , Starting Thu 9/12/2019, Normal      clonazePAM (KlonoPIN) 1 mg tablet TK 3 TS PO Q NIGHT, Historical Med      ferrous gluconate (FERGON) 324 mg tablet Take 1 tablet (324 mg total) by mouth daily with breakfast, Starting Fri 10/9/2020, Normal      gabapentin (NEURONTIN) 300 mg capsule Take 1 pill po Qhs x3 days, then 1 po bid , Normal      levothyroxine 112 mcg tablet TK 1 T PO QD, Historical Med      pantoprazole (PROTONIX) 40 mg tablet Take 1 tablet (40 mg total) by mouth daily, Starting Fri 11/13/2020, Normal      sucralfate (CARAFATE) 1 g/10 mL suspension Take 10 mL (1 g total) by mouth 4 (four) times a day, Starting Fri 11/20/2020, Normal      traZODone (DESYREL) 50 mg tablet TK 2 TO 3 TS PO Q NIGHT, Historical Med           No discharge procedures on file      PDMP Review     None          ED Provider  Electronically Signed by           Joylene Gitelman, MD  03/03/21 9734

## 2021-03-04 LAB
ATRIAL RATE: 157 BPM
ATRIAL RATE: 82 BPM
ATRIAL RATE: 82 BPM
P AXIS: 32 DEGREES
P AXIS: 43 DEGREES
PR INTERVAL: 132 MS
PR INTERVAL: 140 MS
QRS AXIS: 13 DEGREES
QRS AXIS: 5 DEGREES
QRS AXIS: 9 DEGREES
QRSD INTERVAL: 78 MS
QRSD INTERVAL: 80 MS
QRSD INTERVAL: 84 MS
QT INTERVAL: 346 MS
QT INTERVAL: 348 MS
QT INTERVAL: 350 MS
QTC INTERVAL: 404 MS
QTC INTERVAL: 406 MS
QTC INTERVAL: 416 MS
T WAVE AXIS: 10 DEGREES
T WAVE AXIS: 10 DEGREES
T WAVE AXIS: 21 DEGREES
VENTRICULAR RATE: 82 BPM
VENTRICULAR RATE: 82 BPM
VENTRICULAR RATE: 85 BPM

## 2021-03-04 PROCEDURE — 93010 ELECTROCARDIOGRAM REPORT: CPT | Performed by: INTERNAL MEDICINE

## 2021-03-08 ENCOUNTER — TELEPHONE (OUTPATIENT)
Dept: FAMILY MEDICINE CLINIC | Facility: CLINIC | Age: 76
End: 2021-03-08

## 2021-03-08 NOTE — TELEPHONE ENCOUNTER
Pt scheduled for Phone only VV 3/9 @ 5:30  She cannot come into the office as she feels she is too weak and it would be too much of an undertaking

## 2021-03-08 NOTE — TELEPHONE ENCOUNTER
Patient called she took the last azithromycin tablets 250 mg  Yesterday  ---she is still having trouble breathing   It is painful and labor intensive   ---very fatigued  What else can she do ?

## 2021-03-09 ENCOUNTER — TELEMEDICINE (OUTPATIENT)
Dept: FAMILY MEDICINE CLINIC | Facility: CLINIC | Age: 76
End: 2021-03-09
Payer: COMMERCIAL

## 2021-03-09 DIAGNOSIS — J20.9 ACUTE BRONCHITIS, UNSPECIFIED ORGANISM: Primary | ICD-10-CM

## 2021-03-09 PROCEDURE — 99442 PR PHYS/QHP TELEPHONE EVALUATION 11-20 MIN: CPT | Performed by: INTERNAL MEDICINE

## 2021-03-09 RX ORDER — METHYLPREDNISOLONE 4 MG/1
TABLET ORAL
Qty: 21 EACH | Refills: 0 | Status: SHIPPED | OUTPATIENT
Start: 2021-03-09 | End: 2021-03-29 | Stop reason: ALTCHOICE

## 2021-03-09 NOTE — ASSESSMENT & PLAN NOTE
Finished z pack given from ER  Was told she has bronchitis  Better but not going awt   Start medrol dose pack

## 2021-03-09 NOTE — PROGRESS NOTES
Virtual Brief Visit    Assessment/Plan:    Problem List Items Addressed This Visit        Respiratory    Acute bronchitis - Primary     Finished z pack given from ER  Was told she has bronchitis  Better but not going awt  Start medrol dose pack         Relevant Medications    methylPREDNISolone 4 MG tablet therapy pack                Reason for visit is   Chief Complaint   Patient presents with   [address] Visit        Encounter provider Catrachita Mendoza MD    Provider located at [address] [place] [address] [address]  [address] [place] [address] [address] [facility] [address]  [phone]    Recent Visits  Date Type Provider Dept   03/08/21 Telephone Doris Palacios MD [facility]   03/03/21 Telemedicine Doris Palacios MD [facility]   Showing recent visits within past 7 days and meeting all other requirements     Today's Visits  Date Type Provider Dept   03/09/21 Telemedicine Doris Palacios, [address] [facility] today's visits and meeting all other requirements     Future Appointments  No visits were found meeting these conditions  Showing future appointments within next 150 days and meeting all other requirements        After connecting through telephone, the patient was identified by name and date of birth  Geoffrey Lopez was informed that this is a telemedicine visit and that the visit is being conducted through telephone  My office door was closed  No one else was in the room  She acknowledged consent and understanding of privacy and security of the platform  The patient has agreed to participate and understands she can discontinue the visit at any time  Patient is aware this is a billable service  Subjective    Geoffrey Lopez is a 66 y o  female   She is feeling better but cough and some wheezing not going away  She finished azithromycin was given from emergency room    She was told that she has a touch of bronchitis  Her oxygen saturation and x-ray of the chest were okay  She still feeling tired  No gastrointestinal symptoms  This situation got worse after she took 2nd shot of COVID  Day prior to 2nd shot she was started feeling that she is catching something  Her COVID-19 was negative  No dizziness or lightheadedness  No other complaint  HPI     Past Medical History:   Diagnosis Date    Anemia     Anxiety     Bipolar disorder ([address] )     Disease of thyroid gland     Essential hypertension     Essential tremor     Fibromyalgia, primary     GERD (gastroesophageal reflux disease)     Inflammatory polyarthropathy (HCC)     Mammogram abnormal        Past Surgical History:   Procedure Laterality Date    CARPAL TUNNEL RELEASE Bilateral     EGD AND COLONOSCOPY  01/01/2014    GASTRIC BYPASS  07/01/2012    ULNAR NERVE TRANSPOSITION Right        Current Outpatient Medications   Medication Sig Dispense Refill    bisacodyl (DULCOLAX) 5 mg EC tablet Take 2 tablets (10 mg total) by mouth once for 1 dose Please take with start of miralax prep as per office instructions  2 tablet 0    clonazePAM (KlonoPIN) 1 mg tablet TK 3 TS PO Q NIGHT  0    ferrous gluconate (FERGON) 324 mg tablet Take 1 tablet (324 mg total) by mouth daily with breakfast 100 tablet 3    gabapentin (NEURONTIN) 300 mg capsule Take 1 pill po Qhs x3 days, then 1 po bid  (Patient not taking: Reported on 12/22/2020) 60 capsule 4    levothyroxine 112 mcg tablet TK 1 T PO QD  4    methylPREDNISolone 4 MG tablet therapy pack Use as directed on package 21 each 0    pantoprazole (PROTONIX) 40 mg tablet Take 1 tablet (40 mg total) by mouth daily 90 tablet 2    sucralfate (CARAFATE) 1 g/10 mL suspension Take 10 mL (1 g total) by mouth 4 (four) times a day 420 mL 3    traZODone (DESYREL) 50 mg tablet TK 2 TO 3 TS PO Q NIGHT  0     No current facility-administered medications for this visit           No Known Allergies    Review of Systems    There were no vitals filed for this visit  I spent 20 minutes directly with the patient during this visit    VIRTUAL VISIT [address] acknowledges that she has consented to an online visit or consultation  She understands that the online visit is based solely on information provided by her, and that, in the absence of a face-to-face physical evaluation by the physician, the diagnosis she receives is both limited and provisional in terms of accuracy and completeness  This is not intended to replace a full medical face-to-face evaluation by the physician  Дмитрий Aquino understands and accepts these terms

## 2021-03-22 ENCOUNTER — TELEPHONE (OUTPATIENT)
Dept: GASTROENTEROLOGY | Facility: CLINIC | Age: 76
End: 2021-03-22

## 2021-03-22 NOTE — TELEPHONE ENCOUNTER
Patients GI provider:  Dr Gilson Morales    Number to return call: [phone]    Reason for call: Pt calling stating she is having symptoms that makes her feel like she has another peptic ulcer  Pt states she has been taking the carafate but it does not seem to help  Pt states she is also having trouble swallowing      Scheduled procedure/appointment date if applicable: Appt - 45/12/29

## 2021-03-23 NOTE — TELEPHONE ENCOUNTER
Patient of Suzie Garcia, [place], last sen 12/22/20    History of severe epigastric pain, esophageal dysphagia, gastric bypass    Called patient, reached voicemail, left message to call back

## 2021-03-29 ENCOUNTER — TELEMEDICINE (OUTPATIENT)
Dept: FAMILY MEDICINE CLINIC | Facility: CLINIC | Age: 76
End: 2021-03-29
Payer: COMMERCIAL

## 2021-03-29 VITALS — TEMPERATURE: 97.4 F | WEIGHT: 129 LBS | BODY MASS INDEX: 21.49 KG/M2 | HEIGHT: 65 IN

## 2021-03-29 DIAGNOSIS — R06.02 SHORTNESS OF BREATH: Primary | ICD-10-CM

## 2021-03-29 PROCEDURE — 99443 PR PHYS/QHP TELEPHONE EVALUATION 21-30 MIN: CPT | Performed by: INTERNAL MEDICINE

## 2021-03-29 NOTE — ASSESSMENT & PLAN NOTE
Started after 2nd covid vaccine on 2/26/21  ER work up was negative  Medrol did not help     Check CT chest, check 2d echo  Refer to pulmonary

## 2021-03-29 NOTE — PROGRESS NOTES
Virtual Brief Visit    Assessment/Plan:    Problem List Items Addressed This Visit        Other    Shortness of breath - Primary     Started after 2nd covid vaccine on 2/26/21  ER work up was negative  Medrol did not help  Check CT chest, check 2d echo  Refer to pulmonary         Relevant Orders    CT chest wo contrast    Echo complete with contrast if indicated    Ambulatory referral to Pulmonology                Reason for visit is   Chief Complaint   Patient presents with    Cold Like Symptoms     trouble getting over second vaccine of Covid  lethargic, short of breathe, terrible headache, loss of balance, loss of taste and smell, no appetite, cough with chest pain feels like a weight is on her chest     Virtual Brief Visit        Encounter provider Dion Polanco MD    Provider located at [address] [place] [address] [address] [facility] [address] [phone]    Recent Visits  No visits were found meeting these conditions  Showing recent visits within past 7 days and meeting all other requirements     Today's Visits  Date Type Provider Dept   03/29/21 Telemedicine Louis Pepe MD [facility] [address] [address] today's visits and meeting all other requirements     Future Appointments  No visits were found meeting these conditions  Showing future appointments within next 150 days and meeting all other requirements        After connecting through telephone, the patient was identified by name and date of birth  Chon Montez was informed that this is a telemedicine visit and that the visit is being conducted through telephone  My office door was closed  No one else was in the room  She acknowledged consent and understanding of privacy and security of the platform  The patient has agreed to participate and understands she can discontinue the visit at any time  Patient is aware this is a billable service       Elpidio Figueredo Mai Yo Summers is a 66 y o  female   1  Dyspnea with fatigue-   It has been started since she received a 2nd dose of COVID-19 vaccine in last week of February 2021  She has been also feeling tired  She has to stop walking after 20/30 steps  She does have some chronic dry cough since then  Medrol Dosepak did not help  She had full evaluation in emergency room including blood work they were unremarkable  Her appetite is slightly diminished but she is drinking and eating okay  No falls but she feels weak  No headache  No diarrhea  No mucus  No hemoptysis  No fever or chills  Past Medical History:   Diagnosis Date    Anemia     Anxiety     B[address] disorder ([facility] [address] )     Disease of thyroid gland     Essential hypertension     Essential tremor     Fibromyalgia, primary     GERD (gastroesophageal reflux disease)     Inflammatory polyarthropathy (HCC)     Mammogram abnormal        Past Surgical History:   Procedure Laterality Date    CARPAL TUNNEL RELEASE Bilateral     EGD AND COLONOSCOPY  01/01/2014    GASTRIC BYPASS  07/01/2012    ULNAR NERVE TRANSPOSITION Right        Current Outpatient Medications   Medication Sig Dispense Refill    clonazePAM (KlonoPIN) 1 mg tablet TK 3 TS PO Q NIGHT  0    ferrous gluconate (FERGON) 324 mg tablet Take 1 tablet (324 mg total) by mouth daily with breakfast 100 tablet 3    levothyroxine 112 mcg tablet TK 1 T PO QD  4    pantoprazole (PROTONIX) 40 mg tablet Take 1 tablet (40 mg total) by mouth daily 90 tablet 2    sucralfate (CARAFATE) 1 g/10 mL suspension Take 10 mL (1 g total) by mouth 4 (four) times a day 420 mL 3    traZODone (DESYREL) 50 mg tablet TK 2 TO 3 TS PO Q NIGHT  0    bisacodyl (DULCOLAX) 5 mg EC tablet Take 2 tablets (10 mg total) by mouth once for 1 dose Please take with start of miralax prep as per office instructions  2 tablet 0     No current facility-administered medications for this visit           No Known Allergies    Review of Systems    Vitals:    03/29/21 1523   Temp: (!) 97 4 °F (36 3 °C)   TempSrc: Temporal   Weight: 58 5 kg (129 lb)   Height: 5' 5" (1 651 m)         I spent 22 minutes directly with the patient during this visit    VIRTUAL VISIT [address] [facility] acknowledges that she has consented to an online visit or consultation  She understands that the online visit is based solely on information provided by her, and that, in the absence of a face-to-face physical evaluation by the physician, the diagnosis she receives is both limited and provisional in terms of accuracy and completeness  This is not intended to replace a full medical face-to-face evaluation by the physician  Azael Seals understands and accepts these terms

## 2021-05-11 ENCOUNTER — HOSPITAL ENCOUNTER (OUTPATIENT)
Dept: RADIOLOGY | Facility: HOSPITAL | Age: 76
Discharge: HOME | End: 2021-05-11
Payer: COMMERCIAL

## 2021-05-11 ENCOUNTER — TRANSCRIBE ORDERS (OUTPATIENT)
Dept: ADMINISTRATIVE | Facility: HOSPITAL | Age: 76
End: 2021-05-11

## 2021-05-11 VITALS — WEIGHT: 129 LBS | BODY MASS INDEX: 22.02 KG/M2 | HEIGHT: 64 IN

## 2021-05-11 DIAGNOSIS — Z12.31 ENCOUNTER FOR SCREENING MAMMOGRAM FOR MALIGNANT NEOPLASM OF BREAST: ICD-10-CM

## 2021-05-11 PROCEDURE — 77063 BREAST TOMOSYNTHESIS BI: CPT

## 2021-05-11 PROCEDURE — 77067 SCR MAMMO BI INCL CAD: CPT

## 2021-05-12 ENCOUNTER — TELEPHONE (OUTPATIENT)
Dept: FAMILY MEDICINE CLINIC | Facility: CLINIC | Age: 76
End: 2021-05-12

## 2021-05-12 NOTE — TELEPHONE ENCOUNTER
----- Message from Lin Muñiz MD sent at 5/12/2021  9:51 AM EDT -----  Please call the patient regarding her normal mammo  result

## 2021-05-12 NOTE — TELEPHONE ENCOUNTER
Called and left message on patient voicemail to call office for mammogram results as normal   [employer]

## 2021-05-20 NOTE — TELEPHONE ENCOUNTER
Patient had returned call and called patient back and left another message on voicemail Mammogram is normal  Eamon Ayon

## 2021-05-26 ENCOUNTER — OFFICE VISIT (OUTPATIENT)
Dept: GASTROENTEROLOGY | Facility: CLINIC | Age: 76
End: 2021-05-26
Payer: COMMERCIAL

## 2021-05-26 VITALS
HEIGHT: 64 IN | DIASTOLIC BLOOD PRESSURE: 65 MMHG | HEART RATE: 80 BPM | BODY MASS INDEX: 21.68 KG/M2 | TEMPERATURE: 97.3 F | WEIGHT: 127 LBS | SYSTOLIC BLOOD PRESSURE: 102 MMHG

## 2021-05-26 DIAGNOSIS — R63.4 WEIGHT LOSS: ICD-10-CM

## 2021-05-26 DIAGNOSIS — K27.9 PEPTIC ULCER DISEASE: ICD-10-CM

## 2021-05-26 DIAGNOSIS — K21.9 GASTROESOPHAGEAL REFLUX DISEASE WITHOUT ESOPHAGITIS: Primary | ICD-10-CM

## 2021-05-26 PROCEDURE — 99204 OFFICE O/P NEW MOD 45 MIN: CPT | Performed by: INTERNAL MEDICINE

## 2021-05-26 NOTE — PROGRESS NOTES
[facility] - Outpatient Follow-up Note  Talisha Cheek 66 y o  female MRN: [id number]  Encounter: [id number]    ASSESSMENT AND PLAN:    Talisha Cheek is a 66 y o  old female with PMH: Savage-en-Y Gastric Bypass, Fibromyalgia, GERD, polyarthropathy, bipolar disorder who presents for abdominal pain  #Epigastric Abdominal Pain  #Hx of Gastric/Anastamosis Ulcers   Patient continues to have epigastric abdominal pain that has been worsening states the pain is worsened has led to associated decreased appetite  Suspect the likely etiology is ulcers from her previous Savage-en-Y gastric bypass given that these were noted on endoscopy 2 years ago  Will get upper endoscopy to evaluate patient's Savage-en-Y bypass and [place] for ulcers  In the meantime will increase her PPI therapy as well as encourage Carafate for symptomatic relief  We have the endoscopy shows evidence of ulcers, can consider revision however given her age she may not be a candidate  Plan:  -increased Protonix daily to Protonix 40 mg b i d   -can continue sucralfate 10mg QID again   -CT abdomen pelvis with IV and p o  contrast ordered  -strict NSAID avoidance    #Recent Dysphagia  #Decreased Appetite  Patient states she has had a recent dysphagia to foods intermittently  Has also had associated decreased appetite  Will evaluate on upper endoscopy as well as get biopsies to rule out eosinophilic esophagitis  -will take esophgaeal biopsies with EGD  -Carafate as above    ______________________________________________________________________    SUBJECTIVE:   Patient was last seen in GI clinic in December 2020  At that time she was evaluated for continued epigastric pain and esophageal dysphagia  At that time patient had symptomatic management with PPI and Carafate  Of note patient had upper endoscopy and colonoscopy 2019    At that time EGD showed normal esophagus, multiple crater linear ulcers in the gastric body and at the anastomosis  Normal jejunum  Had colonoscopy which was normal and biopsies were negative for microscopic colitis  She states she had history of gastric bypass in  at [facility]  Not many issues post procedure until recently  Patient denies any hematemesis, melena, or hematochezia  Denies any change in bowel habits, no new constipation or diarrhea  REVIEW OF SYSTEMS IS OTHERWISE NEGATIVE      Historical Information   Past Medical History:   Diagnosis Date    Anemia     Anxiety     Bipolar disorder ([address] )     Disease of thyroid gland     Essential hypertension     Essential tremor     Fibromyalgia, primary     GERD (gastroesophageal reflux disease)     Inflammatory polyarthropathy (HCC)     Mammogram abnormal      Past Surgical History:   Procedure Laterality Date    BREAST BIOPSY Right     benign    CARPAL TUNNEL RELEASE Bilateral     EGD AND COLONOSCOPY  2014    GASTRIC BYPASS  2012    ULNAR NERVE TRANSPOSITION Right      Social History   Social History     Substance and Sexual Activity   Alcohol Use Yes    Comment: rare     Social History     Substance and Sexual Activity   Drug Use Never     Social History     Tobacco Use   Smoking Status Former Smoker    Quit date:     Years since quittin 4   Smokeless Tobacco Never Used     Family History   Problem Relation Age of Onset    No Known Problems Mother     No Known Problems Father     No Known Problems Sister     No Known Problems Maternal Grandmother     No Known Problems Maternal Grandfather     No Known Problems Paternal Grandmother     No Known Problems Paternal Grandfather     No Known Problems Sister     No Known Problems Sister     Stomach cancer Maternal Aunt     No Known Problems Maternal Aunt     No Known Problems Maternal Aunt     No Known Problems Maternal Aunt     No Known Problems Paternal Aunt     Leukemia Other        Meds/Allergies     Current Outpatient Medications:     clonazePAM (KlonoPIN) 1 mg tablet    ferrous gluconate (FERGON) 324 mg tablet    levothyroxine 112 mcg tablet    pantoprazole (PROTONIX) 40 mg tablet    sucralfate (CARAFATE) 1 g/10 mL suspension    traZODone (DESYREL) 50 mg tablet    bisacodyl (DULCOLAX) 5 mg EC tablet  No Known Allergies    Objective     Blood pressure 102/65, pulse 80, temperature (!) 97 3 °F (36 3 °C), temperature source Tympanic, height 5' 4" (1 626 m), weight 57 6 kg (127 lb)  Body mass index is 21 8 kg/m²  PHYSICAL EXAM:    General Appearance:   Alert, cooperative, no distress   HEENT:   Normocephalic, atraumatic, anicteric  Neck:  Supple, symmetrical, trachea midline   Lungs:   Clear to auscultation bilaterally; no rales, rhonchi or wheezing; respirations unlabored    Heart[de-identified]   Regular rate and rhythm; no murmur, rub, or gallop  Abdomen:   Soft, non-tender, non-distended; normal bowel sounds; no masses, no organomegaly    Genitalia:   Deferred    Rectal:   Deferred    Extremities:  No cyanosis, clubbing or edema    Pulses:  2+ and symmetric    Skin:  No jaundice, rashes, or lesions    Lymph nodes:  No palpable cervical lymphadenopathy      Lab Results:   No visits with results within 1 Day(s) from this visit     Latest known visit with results is:   Admission on 03/03/2021, Discharged on 03/03/2021   Component Date Value    WBC 03/03/2021 9 96     RBC 03/03/2021 3 73*    Hemoglobin 03/03/2021 12 7     Hematocrit 03/03/2021 38 4     MCV 03/03/2021 103*    MCH 03/03/2021 34 0     MCHC 03/03/2021 33 1     RDW 03/03/2021 13 1     MPV 03/03/2021 9 6     Platelets 24/64/1468 415*    nRBC 03/03/2021 0     Neutrophils Relative 03/03/2021 56     Immat GRANS % 03/03/2021 0     Lymphocytes Relative 03/03/2021 27     Monocytes Relative 03/03/2021 13*    Eosinophils Relative 03/03/2021 4     Basophils Relative 03/03/2021 0     Neutrophils Absolute 03/03/2021 5 53     Immature Grans Absolute 03/03/2021 0 03     Lymphocytes Absolute 03/03/2021 2 68     Monocytes Absolute 03/03/2021 1 30*    Eosinophils Absolute 03/03/2021 0 39     Basophils Absolute 03/03/2021 0 03     Sodium 03/03/2021 136     Potassium 03/03/2021 4 3     Chloride 03/03/2021 100     CO2 03/03/2021 27     ANION GAP 03/03/2021 9     BUN 03/03/2021 15     Creatinine 03/03/2021 0 79     Glucose 03/03/2021 95     Calcium 03/03/2021 9 1     AST 03/03/2021 29     ALT 03/03/2021 29     Alkaline Phosphatase 03/03/2021 79     Total Protein 03/03/2021 7 5     Albumin 03/03/2021 3 6     Total Bilirubin 03/03/2021 0 44     eGFR 03/03/2021 72     Troponin I 03/03/2021 <0 02     Lipase 03/03/2021 138     SARS-CoV-2 03/03/2021 Negative     INFLUENZA A PCR 03/03/2021 Negative     INFLUENZA B PCR 03/03/2021 Negative     RSV PCR 03/03/2021 Negative     D-Dimer, Quant 03/03/2021 0 60*    Troponin I 03/03/2021 <0 02     Ventricular Rate 03/03/2021 82     Atrial Rate 03/03/2021 82     HI Interval 03/03/2021 132     QRSD Interval 03/03/2021 84     QT Interval 03/03/2021 346     QTC Interval 03/03/2021 404     P Axis 03/03/2021 32     QRS Axis 03/03/2021 9     T Wave Axis 03/03/2021 10     Ventricular Rate 03/03/2021 85     Atrial Rate 03/03/2021 157     QRSD Interval 03/03/2021 78     QT Interval 03/03/2021 350     QTC Interval 03/03/2021 416     QRS Axis 03/03/2021 5     T Wave Axis 03/03/2021 10     Ventricular Rate 03/03/2021 82     Atrial Rate 03/03/2021 82     HI Interval 03/03/2021 140     QRSD Interval 03/03/2021 80     QT Interval 03/03/2021 348     QTC Interval 03/03/2021 406     P Axis 03/03/2021 43     QRS Axis 03/03/2021 13     T Wave [place] 03/03/2021 21        Radiology Results:   Mammo Screening Bilateral W 3d & Cad    Result Date: 5/12/2021  Narrative: DIAGNOSIS: Encounter for screening mammogram for malignant neoplasm of breast TECHNIQUE: Digital screening mammography was performed   Computer Aided Detection (CAD) analyzed all applicable images  COMPARISONS: Prior breast imaging dated: 10/11/2019 and 09/25/2019 RELEVANT HISTORY: Family Breast Cancer History: No known family history of breast cancer  Family Medical History: No known relevant family medical history  Personal History: No known relevant hormone history  Surgical history includes breast biopsy  No known relevant medical history  The patient is scheduled in a reminder system for screening mammography  8-10% of cancers will be missed on mammography  Management of a palpable abnormality must be based on clinical grounds  Patients will be notified of their results via letter from our facility  Accredited by [employer] of Radiology and FDA  RISK ASSESSMENT: 5 Year Tyrer-Cuzick: 2 07 % 10 Year Tyrer-Cuzick: No Score Lifetime Tyrer-Cuzick: 3 43 % TISSUE DENSITY: The breasts are extremely dense, which lowers the sensitivity of mammography  INDICATION: Lencho Ag is a 66 y o  female presenting for screening mammography  FINDINGS: Bilateral The parenchymal pattern is stable  There are no suspicious masses, grouped microcalcifications or areas of architectural distortion  Scattered benign-appearing calcifications are seen in both breasts  The skin and nipple areolar complex are unremarkable  Impression: No mammographic evidence of malignancy  No significant change  ASSESSMENT/BI-RADS CATEGORY: Left: 2 - Benign Right: 2 - Benign Overall: 2 - Benign RECOMMENDATION:      - Routine screening mammogram in 1 year for both breasts   Workstation ID: [id number]       ---------------------------------------------------  Note Electronically Signed By:    MD Marcia Collado 71 Gastroenterology Fellow PGY-5  [facility] #: [id number]

## 2021-05-28 ENCOUNTER — TELEPHONE (OUTPATIENT)
Dept: GASTROENTEROLOGY | Facility: CLINIC | Age: 76
End: 2021-05-28

## 2021-05-28 NOTE — TELEPHONE ENCOUNTER
Patients GI provider:  Dr Alyssa Cantu    Number to return call: [phone]    Reason for call: Pt calling to Schedule repeat EGD       Scheduled procedure/appointment date if applicable: n/a

## 2021-06-03 NOTE — TELEPHONE ENCOUNTER
EGD scheduled on 7/7/21 with Dr Fernandez at [facility]  I gave pt verbal instructions/mailed

## 2021-06-11 ENCOUNTER — TELEPHONE (OUTPATIENT)
Dept: GASTROENTEROLOGY | Facility: CLINIC | Age: 76
End: 2021-06-11

## 2021-06-11 NOTE — TELEPHONE ENCOUNTER
Patients GI provider:  Dr Alyssa Cantu    Number to return call: [phone]    Reason for call: Pt calling asking if she would need to take something for her Procedure, Pt stated the last time she took Barium      Scheduled procedure/appointment date if applicable: procedure 1/7/25

## 2021-06-11 NOTE — TELEPHONE ENCOUNTER
Patients GI provider:  Dr Galen Epps    Number to return call: n/a    Reason for call: Pt calling asking for her Procedure Instructions, She wants the instructions mailed to her  Pt stating she does not answer unknown calls       Scheduled procedure/appointment date if applicable:procedure 2/5/19

## 2021-06-14 ENCOUNTER — TELEPHONE (OUTPATIENT)
Dept: GASTROENTEROLOGY | Facility: CLINIC | Age: 76
End: 2021-06-14

## 2021-06-14 DIAGNOSIS — R63.4 WEIGHT LOSS: Primary | ICD-10-CM

## 2021-06-14 NOTE — TELEPHONE ENCOUNTER
Patients GI provider:  Dr Katherine Arias    Number to return call: [phone]    Reason for call: Carmelita Andrews from [facility] calling  stating pt needs BUN and creatnine lab orders for up coming CT abd  Pelvis with contrast scan      Scheduled procedure/appointment date if applicable: N/A

## 2021-06-15 ENCOUNTER — APPOINTMENT (OUTPATIENT)
Dept: LAB | Facility: HOSPITAL | Age: 76
End: 2021-06-15
Payer: COMMERCIAL

## 2021-06-15 DIAGNOSIS — R63.4 WEIGHT LOSS: ICD-10-CM

## 2021-06-15 LAB
ALBUMIN SERPL BCP-MCNC: 4.2 G/DL (ref 3.4–4.8)
ALP SERPL-CCNC: 54.7 U/L (ref 35–140)
ALT SERPL W P-5'-P-CCNC: 22 U/L (ref 5–54)
ANION GAP SERPL CALCULATED.3IONS-SCNC: 6 MMOL/L (ref 4–13)
AST SERPL W P-5'-P-CCNC: 28 U/L (ref 15–41)
BILIRUB SERPL-MCNC: 0.42 MG/DL (ref 0.3–1.2)
BUN SERPL-MCNC: 18 MG/DL (ref 6–20)
CALCIUM SERPL-MCNC: 9.6 MG/DL (ref 8.4–10.2)
CHLORIDE SERPL-SCNC: 103 MMOL/L (ref 96–108)
CO2 SERPL-SCNC: 29 MMOL/L (ref 22–33)
CREAT SERPL-MCNC: 0.72 MG/DL (ref 0.4–1.1)
GFR SERPL CREATININE-BSD FRML MDRD: 81 ML/MIN/1.73SQ M
GLUCOSE P FAST SERPL-MCNC: 85 MG/DL (ref 70–105)
POTASSIUM SERPL-SCNC: 4.4 MMOL/L (ref 3.5–5)
PROT SERPL-MCNC: 7.2 G/DL (ref 6.4–8.3)
SODIUM SERPL-SCNC: 138 MMOL/L (ref 133–145)

## 2021-06-15 PROCEDURE — 80053 COMPREHEN METABOLIC PANEL: CPT

## 2021-06-15 PROCEDURE — 36415 COLL VENOUS BLD VENIPUNCTURE: CPT

## 2021-06-15 NOTE — TELEPHONE ENCOUNTER
TC to cl to discuss prep for EGD scheduled on 7/7  Advised cl that prep is NPO after midnight  Cl believed she was supposed to take barium, and stated she needed to do so for EGD in 2019  Reiterated to cl that there is nothing more to do beyond the NPO  Mailed prep instructions to cl's home

## 2021-06-16 ENCOUNTER — HOSPITAL ENCOUNTER (OUTPATIENT)
Dept: CT IMAGING | Facility: HOSPITAL | Age: 76
Discharge: HOME | End: 2021-06-16
Payer: COMMERCIAL

## 2021-06-16 DIAGNOSIS — R63.4 WEIGHT LOSS: ICD-10-CM

## 2021-06-16 DIAGNOSIS — K21.9 GASTROESOPHAGEAL REFLUX DISEASE WITHOUT ESOPHAGITIS: ICD-10-CM

## 2021-06-16 DIAGNOSIS — K27.9 PEPTIC ULCER DISEASE: ICD-10-CM

## 2021-06-16 PROCEDURE — 74177 CT ABD & PELVIS W/CONTRAST: CPT

## 2021-06-16 PROCEDURE — G1004 CDSM NDSC: HCPCS

## 2021-06-16 RX ADMIN — IOHEXOL 85 ML: 350 INJECTION, SOLUTION INTRAVENOUS at 12:55

## 2021-07-06 ENCOUNTER — TELEPHONE (OUTPATIENT)
Dept: GASTROENTEROLOGY | Facility: AMBULARY SURGERY CENTER | Age: 76
End: 2021-07-06

## 2021-07-07 ENCOUNTER — ANESTHESIA (OUTPATIENT)
Dept: GASTROENTEROLOGY | Facility: AMBULARY SURGERY CENTER | Age: 76
End: 2021-07-07

## 2021-07-07 ENCOUNTER — HOSPITAL ENCOUNTER (OUTPATIENT)
Dept: GASTROENTEROLOGY | Facility: AMBULARY SURGERY CENTER | Age: 76
Setting detail: OUTPATIENT SURGERY
Discharge: HOME | End: 2021-07-07
Admitting: STUDENT IN AN ORGANIZED HEALTH CARE EDUCATION/TRAINING PROGRAM
Payer: COMMERCIAL

## 2021-07-07 ENCOUNTER — ANESTHESIA EVENT (OUTPATIENT)
Dept: GASTROENTEROLOGY | Facility: AMBULARY SURGERY CENTER | Age: 76
End: 2021-07-07

## 2021-07-07 VITALS
TEMPERATURE: 96.9 F | HEIGHT: 64 IN | WEIGHT: 126.2 LBS | SYSTOLIC BLOOD PRESSURE: 123 MMHG | HEART RATE: 73 BPM | BODY MASS INDEX: 21.54 KG/M2 | RESPIRATION RATE: 18 BRPM | OXYGEN SATURATION: 100 % | DIASTOLIC BLOOD PRESSURE: 65 MMHG

## 2021-07-07 DIAGNOSIS — K21.9 GASTROESOPHAGEAL REFLUX DISEASE WITHOUT ESOPHAGITIS: ICD-10-CM

## 2021-07-07 DIAGNOSIS — K27.9 PEPTIC ULCER DISEASE: ICD-10-CM

## 2021-07-07 PROCEDURE — 88305 TISSUE EXAM BY PATHOLOGIST: CPT | Performed by: PATHOLOGY

## 2021-07-07 PROCEDURE — 88342 IMHCHEM/IMCYTCHM 1ST ANTB: CPT | Performed by: PATHOLOGY

## 2021-07-07 PROCEDURE — 43239 EGD BIOPSY SINGLE/MULTIPLE: CPT | Performed by: INTERNAL MEDICINE

## 2021-07-07 RX ORDER — PROPOFOL 10 MG/ML
INJECTION, EMULSION INTRAVENOUS AS NEEDED
Status: DISCONTINUED | OUTPATIENT
Start: 2021-07-07 | End: 2021-07-07

## 2021-07-07 RX ORDER — LIDOCAINE HYDROCHLORIDE 10 MG/ML
INJECTION, SOLUTION EPIDURAL; INFILTRATION; INTRACAUDAL; PERINEURAL AS NEEDED
Status: DISCONTINUED | OUTPATIENT
Start: 2021-07-07 | End: 2021-07-07

## 2021-07-07 RX ORDER — SODIUM CHLORIDE, SODIUM LACTATE, POTASSIUM CHLORIDE, CALCIUM CHLORIDE 600; 310; 30; 20 MG/100ML; MG/100ML; MG/100ML; MG/100ML
INJECTION, SOLUTION INTRAVENOUS CONTINUOUS PRN
Status: DISCONTINUED | OUTPATIENT
Start: 2021-07-07 | End: 2021-07-07

## 2021-07-07 RX ADMIN — LIDOCAINE HYDROCHLORIDE 40 MG: 10 INJECTION, SOLUTION EPIDURAL; INFILTRATION; INTRACAUDAL at 08:27

## 2021-07-07 RX ADMIN — PROPOFOL 90 MG: 10 INJECTION, EMULSION INTRAVENOUS at 08:27

## 2021-07-07 RX ADMIN — PROPOFOL 50 MG: 10 INJECTION, EMULSION INTRAVENOUS at 08:30

## 2021-07-07 RX ADMIN — SODIUM CHLORIDE, SODIUM LACTATE, POTASSIUM CHLORIDE, AND CALCIUM CHLORIDE: .6; .31; .03; .02 INJECTION, SOLUTION INTRAVENOUS at 07:48

## 2021-07-07 NOTE — H&P
History and Physical - [facility]  Diaz Burton 66 y o  female MRN: [id number]                  HPI: Diaz Burton is a 66y o  year old female who presents for EGD      REVIEW OF SYSTEMS: Per the HPI, and otherwise unremarkable      Historical Information   Past Medical History:   Diagnosis Date    Anemia     Anxiety     Bipolar disorder ([address] )     Colon polyp     Disease of thyroid gland     Essential hypertension     Essential tremor     Fibromyalgia, primary     GERD (gastroesophageal reflux disease)     Inflammatory polyarthropathy (HCC)     Mammogram abnormal      Past Surgical History:   Procedure Laterality Date    BREAST BIOPSY Right 2015    benign    CARPAL TUNNEL RELEASE Bilateral     COLONOSCOPY      EGD AND COLONOSCOPY  2014    GASTRIC BYPASS  2012    ULNAR NERVE TRANSPOSITION Right      Social History   Social History     Substance and Sexual Activity   Alcohol Use Yes    Comment: rare     Social History     Substance and Sexual Activity   Drug Use Never     Social History     Tobacco Use   Smoking Status Former Smoker    Quit date:     Years since quittin 5   Smokeless Tobacco Never Used     Family History   Problem Relation Age of Onset    No Known Problems Mother     No Known Problems Father     No Known Problems Sister     No Known Problems Maternal Grandmother     No Known Problems Maternal Grandfather     No Known Problems Paternal Grandmother     No Known Problems Paternal Grandfather     No Known Problems Sister     No Known Problems Sister     Stomach cancer Maternal Aunt     No Known Problems Maternal Aunt     No Known Problems Maternal Aunt     No Known Problems Maternal Aunt     No Known Problems Paternal Aunt     Leukemia Other        Meds/Allergies       Current Outpatient Medications:     clonazePAM (KlonoPIN) 1 mg tablet    ferrous gluconate (FERGON) 324 mg tablet    levothyroxine 112 mcg tablet    pantoprazole (PROTONIX) 40 mg tablet    traZODone (DESYREL) 50 mg tablet    bisacodyl (DULCOLAX) 5 mg EC tablet    sucralfate (CARAFATE) 1 g/10 mL suspension  No current facility-administered medications for this encounter  Facility-Administered Medications Ordered in Other Encounters:     lactated ringers infusion, , Intravenous, Continuous PRN, New Bag at 07/07/21 0748    No Known Allergies    Objective     /67   Pulse 67   Temp (!) 97 3 °F (36 3 °C) (Temporal)   Resp 18   Ht 5' 4" (1 626 m)   Wt 57 2 kg (126 lb 3 2 oz)   SpO2 100%   BMI 21 66 kg/m²       PHYSICAL EXAM    Gen: NAD  Head: NCAT  CV: RRR  CHEST: Clear  ABD: soft, NT/ND  EXT: no edema      ASSESSMENT/PLAN:  This is a 66y o  year old female here for EGD, and she is stable and optimized for her procedure

## 2021-07-07 NOTE — ANESTHESIA PREPROCEDURE EVALUATION
Procedure:  EGD    Relevant Problems   ANESTHESIA (within normal limits)      CARDIO (within normal limits)      ENDO   (+) Other specified hypothyroidism      GI/HEPATIC   (+) Gastroesophageal reflux disease without esophagitis      MUSCULOSKELETAL   (+) Fibromyalgia, primary      NEURO/PSYCH   (+) Fibromyalgia, primary   (+) Mixed anxiety depressive disorder      PULMONARY  COVID 3/2021 - resp symptoms resolved   (-) Smoking      Physical Exam    Airway  Comment: Small mouth  Mallampati score: III  TM Distance: >3 FB  Neck ROM: full     Dental       Cardiovascular      Pulmonary      Other Findings        Anesthesia Plan  ASA Score- 2     Anesthesia Type- IV sedation with anesthesia with ASA Monitors  Additional Monitors:   Airway Plan:           Plan Factors-Exercise tolerance (METS): >4 METS  Chart reviewed  Existing labs reviewed  Patient summary reviewed  Patient is not a current smoker  Induction- intravenous  Postoperative Plan-     Informed Consent- Anesthetic plan and risks discussed with patient  I personally reviewed this patient with the CRNA  Discussed and agreed on the Anesthesia Plan with the CRNA  Bakari Cazares

## 2021-07-07 NOTE — ANESTHESIA POSTPROCEDURE EVALUATION
Post-Op Assessment Note    CV Status:  Stable  Pain Score: 0    Pain management: adequate     Mental Status:  Alert and awake   Hydration Status:  Euvolemic   PONV Controlled:  Controlled   Airway Patency:  Patent      Post Op Vitals Reviewed: Yes      Staff: CRNA         No complications documented      BP   105/57   Temp     Pulse  72   Resp   16   SpO2   100

## 2021-07-16 ENCOUNTER — TELEPHONE (OUTPATIENT)
Dept: GASTROENTEROLOGY | Facility: CLINIC | Age: 76
End: 2021-07-16

## 2021-09-22 ENCOUNTER — TELEPHONE (OUTPATIENT)
Dept: FAMILY MEDICINE CLINIC | Facility: CLINIC | Age: 76
End: 2021-09-22

## 2021-09-22 NOTE — TELEPHONE ENCOUNTER
Called andtried to  Robby for Dr Sanchez Grimes office [phone] and not taking messages   Will try in morning office is only in until 4 pm  [facility]

## 2021-09-22 NOTE — TELEPHONE ENCOUNTER
Patient called stating she needs a call from Dr Mary Wilson nurse to help with information in regards to Rheumatologist and seeing Dr Shelbi Littlejohn? Please advise

## 2021-09-23 ENCOUNTER — TELEPHONE (OUTPATIENT)
Dept: FAMILY MEDICINE CLINIC | Facility: CLINIC | Age: 76
End: 2021-09-23

## 2021-09-23 DIAGNOSIS — E61.1 IRON DEFICIENCY: ICD-10-CM

## 2021-09-23 RX ORDER — DOXYCYCLINE HYCLATE 50 MG/1
324 CAPSULE, GELATIN COATED ORAL
Qty: 100 TABLET | Refills: 3 | Status: SHIPPED | OUTPATIENT
Start: 2021-09-23 | End: 2022-05-28

## 2021-09-23 NOTE — TELEPHONE ENCOUNTER
Pt called and stated that she is in need of the following medication      ferrous gluconate (FERGON) 324 mg tablet

## 2021-09-23 NOTE — TELEPHONE ENCOUNTER
Called [facility] office and they will be calling patient to schedule appointment     Kris Meeks

## 2021-09-28 ENCOUNTER — OFFICE VISIT (OUTPATIENT)
Dept: FAMILY MEDICINE CLINIC | Facility: CLINIC | Age: 76
End: 2021-09-28
Payer: COMMERCIAL

## 2021-09-28 VITALS
BODY MASS INDEX: 21.23 KG/M2 | TEMPERATURE: 97.1 F | OXYGEN SATURATION: 97 % | WEIGHT: 127.4 LBS | SYSTOLIC BLOOD PRESSURE: 120 MMHG | HEIGHT: 65 IN | DIASTOLIC BLOOD PRESSURE: 70 MMHG | HEART RATE: 76 BPM | RESPIRATION RATE: 14 BRPM

## 2021-09-28 DIAGNOSIS — F41.8 MIXED ANXIETY DEPRESSIVE DISORDER: ICD-10-CM

## 2021-09-28 DIAGNOSIS — M79.7 FIBROMYALGIA, PRIMARY: ICD-10-CM

## 2021-09-28 DIAGNOSIS — E03.8 OTHER SPECIFIED HYPOTHYROIDISM: ICD-10-CM

## 2021-09-28 DIAGNOSIS — Z00.00 MEDICARE ANNUAL WELLNESS VISIT, SUBSEQUENT: Primary | ICD-10-CM

## 2021-09-28 DIAGNOSIS — Z13.820 SCREENING FOR OSTEOPOROSIS: ICD-10-CM

## 2021-09-28 DIAGNOSIS — R10.13 EPIGASTRIC PAIN: ICD-10-CM

## 2021-09-28 DIAGNOSIS — E61.1 IRON DEFICIENCY: ICD-10-CM

## 2021-09-28 PROCEDURE — 99214 OFFICE O/P EST MOD 30 MIN: CPT | Performed by: INTERNAL MEDICINE

## 2021-09-28 PROCEDURE — G0439 PPPS, SUBSEQ VISIT: HCPCS | Performed by: INTERNAL MEDICINE

## 2021-09-28 RX ORDER — DULOXETIN HYDROCHLORIDE 30 MG/1
30 CAPSULE, DELAYED RELEASE ORAL DAILY
Qty: 30 CAPSULE | Refills: 5 | Status: SHIPPED | OUTPATIENT
Start: 2021-09-28 | End: 2022-05-24

## 2021-09-28 NOTE — PATIENT INSTRUCTIONS
Fall Prevention   AMBULATORY CARE:   Fall prevention  includes ways to make your home and other areas safer  It also includes ways you can move more carefully to prevent a fall  Health conditions that cause changes in your blood pressure, vision, or muscle strength and coordination may increase your risk for falls  Medicines may also increase your risk for falls if they make you dizzy, weak, or sleepy  Call 911 or have someone else call if:   · You have fallen and are unconscious  · You have fallen and cannot move part of your body  Contact your healthcare provider if:   · You have fallen and have pain or a headache  · You have questions or concerns about your condition or care  Fall prevention tips:   · Stand or sit up slowly  This may help you keep your balance and prevent falls  · Use assistive devices as directed  Your healthcare provider may suggest that you use a cane or walker to help you keep your balance  You may need to have grab bars put in your bathroom near the toilet or in the shower  · Wear shoes that fit well and have soles that   Wear shoes both inside and outside  Use slippers with good   Do not wear shoes with high heels  · Wear a personal alarm  This is a device that allows you to call 911 if you fall and need help  Ask your healthcare provider for more information  · Stay active  Exercise can help strengthen your muscles and improve your balance  Your healthcare provider may recommend water aerobics or walking  He or she may also recommend physical therapy to improve your coordination  Never start an exercise program without talking to your healthcare provider first          · Manage your medical conditions  Keep all appointments with your healthcare providers  Visit your eye doctor as directed  Home safety tips:       · Add items to prevent falls in the bathroom  Put nonslip strips on your bath or shower floor to prevent you from slipping   Use a bath mat if you do not have carpet in the bathroom  This will prevent you from falling when you step out of the bath or shower  Use a shower seat so you do not need to stand while you shower  Sit on the toilet or a chair in your bathroom to dry yourself and put on clothing  This will prevent you from losing your balance from drying or dressing yourself while you are standing  · Keep paths clear  Remove books, shoes, and other objects from walkways and stairs  Place cords for telephones and lamps out of the way so that you do not need to walk over them  Tape them down if you cannot move them  Remove small rugs  If you cannot remove a rug, secure it with double-sided tape  This will prevent you from tripping  · Install bright lights in your home  Use night lights to help light paths to the bathroom or kitchen  Always turn on the light before you start walking  · Keep items you use often on shelves within reach  Do not use a step stool to help you reach an item  · Paint or place reflective tape on the edges of your stairs  This will help you see the stairs better  Follow up with your healthcare provider as directed:  Write down your questions so you remember to ask them during your visits  © Copyright [employer] 2021 Information is for End User's use only and may not be sold, redistributed or otherwise used for commercial purposes  All illustrations and images included in CareNotes® are the copyrighted property of A D A M [employer] Inc  or Kaur Chavez  The above information is an  only  It is not intended as medical advice for individual conditions or treatments  Talk to your doctor, nurse or pharmacist before following any medical regimen to see if it is safe and effective for you

## 2021-09-28 NOTE — PROGRESS NOTES
Assessment/Plan:         Problem List Items Addressed This Visit        Endocrine    Other specified hypothyroidism     Continue levothyroxine  Check LABS         Relevant Orders    CBC and differential    Comprehensive metabolic panel    TSH, 3rd generation    Lipid panel       Musculoskeletal and Integument    Fibromyalgia, primary     More pain lately  Try duloxetine 30 mg od  S/e d/w  pt          Relevant Medications    DULoxetine (CYMBALTA) 30 mg delayed release capsule       Other    Mixed anxiety depressive disorder    Relevant Medications    DULoxetine (CYMBALTA) 30 mg delayed release capsule    Iron deficiency     hgb stable  Continue ferrous sulfate         Relevant Orders    CBC and differential    Comprehensive metabolic panel    Ferritin    Epigastric pain     S/p EGD- gastritis  Better  Continue pantoprazole  Medicare annual wellness visit, subsequent - Primary     Already received flu vaccine  Scheduled for 3rd covid shot on 10/9/21  Check dexa  Other Visit Diagnoses     Screening for osteoporosis        Relevant Orders    DXA bone density spine hip and pelvis            Subjective:      Patient ID: Angel Vargas is a 64 y o  female  1  Hypothyroidism-  On levothyroxine  Tolerating well  No significant weight changes  No hair loss  No cold or heat intolerance  No constipation or diarrhea  No tremors  No chest pain or dyspnea  No edema  2  Gastritis-  Recently underwent for endoscopy  She is on pantoprazole  It is helping her  No nausea or vomiting  No epigastric pain  No black stool or blood in the stool  No anorexia  3  Anxiety-  On and off anxiety gets worse  She is on clonazepam as needed as well as trazodone at bedtime  No depressed mood  No suicidal or homicidal ideations  No kale  4  Fatigue since vaccine or   COVID infection  It is ongoing  Hemoglobin stable ferritin level was okay  She is on iron pill  5   Fibromyalgia    On and off she has more pain  She was told that she has a fibromyalgia by her rheumatologist in the past   She never tried Cymbalta  We will try 30 mg and see if it helps  The following portions of the patient's history were reviewed and updated as appropriate:   Past Medical History:  She has a past medical history of Anemia, Anxiety, Bipolar disorder ([facility] ), Colon polyp, Disease of thyroid gland, Essential hypertension, Essential tremor, Fibromyalgia, primary, GERD (gastroesophageal reflux disease), Inflammatory polyarthropathy ([facility] ), and Mammogram abnormal ,  _______________________________________________________________________  Medical Problems:  does not have any pertinent problems on file ,  _______________________________________________________________________  Past Surgical History:   has a past surgical history that includes Gastric bypass (07/01/2012); EGD AND COLONOSCOPY (01/01/2014); Carpal tunnel release (Bilateral); Ulnar nerve transposition (Right); Breast biopsy (Right, 2015); and Colonoscopy  ,  _______________________________________________________________________  Family History:  family history includes Leukemia in her other; No Known Problems in her father, maternal aunt, maternal aunt, maternal aunt, maternal grandfather, maternal grandmother, mother, paternal aunt, paternal grandfather, paternal grandmother, sister, sister, and sister; Stomach cancer in her maternal aunt  ,  _______________________________________________________________________  Social History:   reports that she quit smoking about 35 years ago  She has never used smokeless tobacco  She reports current alcohol use of about 4 0 standard drinks of alcohol per week  She reports that she does not use drugs  ,  _______________________________________________________________________  Allergies:  has No Known Allergies     _______________________________________________________________________  Current Outpatient Medications   Medication Sig Dispense Refill    clonazePAM (KlonoPIN) 1 mg tablet TK 3 TS PO Q NIGHT  0    ferrous gluconate (FERGON) 324 mg tablet Take 1 tablet (324 mg total) by mouth daily with breakfast 100 tablet 3    levothyroxine 112 mcg tablet TK 1 T PO QD  4    pantoprazole (PROTONIX) 40 mg tablet Take 1 tablet (40 mg total) by mouth daily 90 tablet 2    sucralfate (CARAFATE) 1 g/10 mL suspension Take 10 mL (1 g total) by mouth 4 (four) times a day 420 mL 3    traZODone (DESYREL) 50 mg tablet TK 2 TO 3 TS PO Q NIGHT  0    bisacodyl (DULCOLAX) 5 mg EC tablet Take 2 tablets (10 mg total) by mouth once for 1 dose Please take with start of miralax prep as per office instructions  2 tablet 0    DULoxetine (CYMBALTA) 30 mg delayed release capsule Take 1 capsule (30 mg total) by mouth daily 30 capsule 5     No current facility-administered medications for this visit      _______________________________________________________________________  Review of Systems   Constitutional: Negative for appetite change, chills, diaphoresis, fatigue and fever  HENT: Negative for congestion, drooling and sinus pain  Eyes: Negative for discharge and itching  Respiratory: Negative for cough, chest tightness and shortness of breath  Cardiovascular: Negative for chest pain, palpitations and leg swelling  Gastrointestinal: Negative  Endocrine: Negative for polyphagia and polyuria  Genitourinary: Negative for difficulty urinating, dysuria, frequency and urgency  Skin: Negative for pallor and rash  Allergic/Immunologic: Negative for food allergies  Neurological: Negative for dizziness, seizures, speech difficulty, light-headedness, numbness and headaches  Hematological: Negative for adenopathy  Does not bruise/bleed easily  Psychiatric/Behavioral: Negative for agitation, confusion, decreased concentration, dysphoric mood, sleep disturbance and suicidal ideas           Objective:  Vitals:    09/28/21 1329   BP: 120/70   BP Location: Left arm   Patient Position: Sitting   Cuff Size: Standard   Pulse: 76   Resp: 14   Temp: (!) 97 1 °F (36 2 °C)   TempSrc: Temporal   SpO2: 97%   Weight: 57 8 kg (127 lb 6 4 oz)   Height: 5' 4 5" (1 638 m)     Body mass index is 21 53 kg/m²  Physical Exam  Vitals and nursing note reviewed  Constitutional:       Appearance: Normal appearance  She is well-developed  She is not ill-appearing  HENT:      Head: Atraumatic  Eyes:      General:         Right eye: No discharge  Left eye: No discharge  Neck:      Thyroid: No thyromegaly  Cardiovascular:      Rate and Rhythm: Normal rate and regular rhythm  Pulses: Normal pulses  Heart sounds: Normal heart sounds  Pulmonary:      Effort: Pulmonary effort is normal       Breath sounds: Normal breath sounds  No wheezing  Chest:      Chest wall: No tenderness  Abdominal:      General: Bowel sounds are normal  There is no distension  Palpations: Abdomen is soft  There is no mass  Tenderness: There is no abdominal tenderness  Musculoskeletal:         General: No tenderness  Cervical back: Neck supple  Right lower leg: No edema  Left lower leg: No edema  Lymphadenopathy:      Cervical: No cervical adenopathy  Skin:     Findings: No erythema  Neurological:      Mental Status: She is alert and oriented to person, place, and time     Psychiatric:         Mood and Affect: Mood normal          Behavior: Behavior normal          Judgment: Judgment normal

## 2021-09-28 NOTE — PROGRESS NOTES
Caryle Burn is here for her Subsequent Wellness visit  Last Medicare Wellness visit information reviewed, patient interviewed and updates made to the record today  Health Risk Assessment:   Patient rates overall health as good  Patient feels that their physical health rating is same  Patient is satisfied with their life  Eyesight was rated as slightly worse  Hearing was rated as same  Patient feels that their emotional and mental health rating is same  Patients states they are sometimes angry  Patient states they are often unusually tired/fatigued  Pain experienced in the last 7 days has been a lot  Patient's pain rating has been 8/10  Patient states that she has experienced no weight loss or gain in last 6 months  Depression Screening:   PHQ-2 Score: 0  PHQ-9 Score: 2      Fall Risk Screening: In the past year, patient has experienced: history of falling in past year    Number of falls: 1  Injured during fall?: Yes    Feels unsteady when standing or walking?: No    Worried about falling?: No      Urinary Incontinence Screening:   Patient has leaked urine accidently in the last six months  Wears pads all the time    Home Safety:  Patient does not have trouble with stairs inside or outside of their home  Patient has working smoke alarms and has working carbon monoxide detector  Home safety hazards include: loose rugs on the floor and not having non-slip bath and/or shower mats  Nutrition:   Current diet is Regular and Limited junk food  Medications:   Patient is currently taking over-the-counter supplements  OTC medications include: see medication list  Patient is able to manage medications  Activities of Daily Living (ADLs)/Instrumental Activities of Daily Living (IADLs):   Walk and transfer into and out of bed and chair?: Yes  Dress and groom yourself?: Yes    Bathe or shower yourself?: Yes    Feed yourself?  Yes  Do your laundry/housekeeping?: Yes  Manage your money, pay your bills and track your expenses?: Yes  Make your own meals?: Yes    Do your own shopping?: Yes    Previous Hospitalizations:   Any hospitalizations or ED visits within the last 12 months?: Yes    How many hospitalizations have you had in the last year?: 1-2    Advance Care Planning:   Living will: No    Durable POA for healthcare: No      Cognitive Screening:   Provider or family/friend/caregiver concerned regarding cognition?: No    PREVENTIVE SCREENINGS      Cardiovascular Screening:    General: Screening Current      Diabetes Screening:     General: Screening Current      Colorectal Cancer Screening:     General: Screening Current      Breast Cancer Screening:     General: Screening Current      Cervical Cancer Screening:    General: Screening Not Indicated      Osteoporosis Screening:      Due for: DXA Axial      Abdominal Aortic Aneurysm (AAA) Screening:        General: Screening Not Indicated      Lung Cancer Screening:     General: Screening Not Indicated      Hepatitis C Screening:    General: Screening Current    Screening, Brief Intervention, and Referral to Treatment (SBIRT)    Screening  Typical number of drinks in a day: 1  Typical number of drinks in a week: 5  Interpretation: Low risk drinking behavior  AUDIT-C Screenin) How often did you have a drink containing alcohol in the past year? 4 or more times a week  2) How many drinks did you have on a typical day when you were drinking in the past year? 1 to 2  3) How often did you have 6 or more drinks on one occasion in the past year? never    AUDIT-C Score: 4  Interpretation: Score 3-12 (female): POSITIVE screen for alcohol misuse    AUDIT Screenin) How often during the last year have you found that you were not able to stop drinking once you had started?  0 - never  5) How often during the last year have you failed to do what was normally expected from you because of drinking? 0 - never  6) How often during the last year have you needed a first drink in the morning to get yourself going after a heavy drinking session? 0 - never  7) How often during the last year have you had a feeling of guilt or remorse after drinking? 0 - never  8) How often during the last year have you been unable to remember what happened the night before because you had been drinking? 0 - never  9) Have you or someone else been injured as a result of your drinking? 0 - no  10) Has a relative or friend or a doctor or another health worker been concerned about your drinking or suggested you cut down? 0 - no    AUDIT Score: 4  Interpretation: Low risk alcohol consumption    Single Item Drug Screening:  How often have you used an illegal drug (including marijuana) or a prescription medication for non-medical reasons in the past year? never    Single Item Drug Screen Score: 0  Interpretation: Negative screen for possible drug use disorder    Brief Intervention  Healthy alcohol use/limits discussed  Other Counseling Topics:   Car/seat belt/driving safety, skin self-exam, sunscreen and calcium and vitamin D intake and regular weightbearing exercise  Falls Plan of Care: Balance, strength, and gait training instructions were provided

## 2021-10-09 ENCOUNTER — IMMUNIZATIONS (OUTPATIENT)
Dept: FAMILY MEDICINE CLINIC | Facility: HOSPITAL | Age: 76
End: 2021-10-09

## 2021-10-09 DIAGNOSIS — Z23 ENCOUNTER FOR IMMUNIZATION: Primary | ICD-10-CM

## 2021-10-09 PROCEDURE — 0001A SARS-COV-2 / COVID-19 MRNA VACCINE (PFIZER-BIONTECH) 30 MCG: CPT

## 2021-10-09 PROCEDURE — 91300 SARS-COV-2 / COVID-19 MRNA VACCINE (PFIZER-BIONTECH) 30 MCG: CPT

## 2021-11-01 ENCOUNTER — TELEPHONE (OUTPATIENT)
Dept: FAMILY MEDICINE CLINIC | Facility: CLINIC | Age: 76
End: 2021-11-01

## 2021-11-01 DIAGNOSIS — Z23 ENCOUNTER FOR IMMUNIZATION: Primary | ICD-10-CM

## 2021-11-02 ENCOUNTER — HOSPITAL ENCOUNTER (OUTPATIENT)
Dept: RADIOLOGY | Facility: HOSPITAL | Age: 76
Discharge: HOME | End: 2021-11-02
Payer: COMMERCIAL

## 2021-11-02 DIAGNOSIS — Z13.820 SCREENING FOR OSTEOPOROSIS: ICD-10-CM

## 2021-11-02 PROCEDURE — 77080 DXA BONE DENSITY AXIAL: CPT

## 2021-11-03 ENCOUNTER — TELEPHONE (OUTPATIENT)
Dept: FAMILY MEDICINE CLINIC | Facility: CLINIC | Age: 76
End: 2021-11-03

## 2021-11-05 DIAGNOSIS — Z12.31 ENCOUNTER FOR SCREENING MAMMOGRAM FOR MALIGNANT NEOPLASM OF BREAST: Primary | ICD-10-CM

## 2021-11-11 ENCOUNTER — TELEPHONE (OUTPATIENT)
Dept: FAMILY MEDICINE CLINIC | Facility: CLINIC | Age: 76
End: 2021-11-11

## 2021-12-02 ENCOUNTER — TELEPHONE (OUTPATIENT)
Dept: FAMILY MEDICINE CLINIC | Facility: CLINIC | Age: 76
End: 2021-12-02

## 2021-12-14 ENCOUNTER — OFFICE VISIT (OUTPATIENT)
Dept: FAMILY MEDICINE CLINIC | Facility: CLINIC | Age: 76
End: 2021-12-14
Payer: COMMERCIAL

## 2021-12-14 ENCOUNTER — TELEPHONE (OUTPATIENT)
Dept: FAMILY MEDICINE CLINIC | Facility: CLINIC | Age: 76
End: 2021-12-14

## 2021-12-14 VITALS
BODY MASS INDEX: 21.33 KG/M2 | WEIGHT: 128 LBS | TEMPERATURE: 97.2 F | RESPIRATION RATE: 20 BRPM | OXYGEN SATURATION: 97 % | SYSTOLIC BLOOD PRESSURE: 122 MMHG | HEIGHT: 65 IN | DIASTOLIC BLOOD PRESSURE: 70 MMHG | HEART RATE: 72 BPM

## 2021-12-14 DIAGNOSIS — M79.7 FIBROMYALGIA SYNDROME: Primary | ICD-10-CM

## 2021-12-14 DIAGNOSIS — M85.831 OSTEOPENIA OF BOTH FOREARMS: ICD-10-CM

## 2021-12-14 DIAGNOSIS — M85.832 OSTEOPENIA OF BOTH FOREARMS: ICD-10-CM

## 2021-12-14 PROCEDURE — 99214 OFFICE O/P EST MOD 30 MIN: CPT | Performed by: INTERNAL MEDICINE

## 2021-12-28 ENCOUNTER — TELEPHONE (OUTPATIENT)
Dept: FAMILY MEDICINE CLINIC | Facility: CLINIC | Age: 76
End: 2021-12-28

## 2021-12-28 NOTE — TELEPHONE ENCOUNTER
Pt called and stated that she is in need of a referral to an orthopedic doctor   Please call when complete  [phone]

## 2021-12-30 ENCOUNTER — TELEPHONE (OUTPATIENT)
Dept: FAMILY MEDICINE CLINIC | Facility: CLINIC | Age: 76
End: 2021-12-30

## 2021-12-30 NOTE — TELEPHONE ENCOUNTER
Called pt to find out what is the reason for an ortho referral  unable to leave msg ,, mail box not set up yet

## 2021-12-30 NOTE — TELEPHONE ENCOUNTER
Pt called and stated that she is in need of something she stated that she had a fall and injured her self ,, her back , shoulder , pain is really bad , she stated that she would like you to recommend something for pain she stated that she is in to much pain to go to the ER

## 2022-01-01 ENCOUNTER — APPOINTMENT (INPATIENT)
Dept: NON INVASIVE DIAGNOSTICS | Facility: HOSPITAL | Age: 77
End: 2022-01-01

## 2022-01-01 ENCOUNTER — APPOINTMENT (INPATIENT)
Dept: RADIOLOGY | Facility: HOSPITAL | Age: 77
End: 2022-01-01

## 2022-01-01 ENCOUNTER — APPOINTMENT (OUTPATIENT)
Dept: GASTROENTEROLOGY | Facility: HOSPITAL | Age: 77
End: 2022-01-01

## 2022-01-01 ENCOUNTER — APPOINTMENT (OUTPATIENT)
Dept: RADIOLOGY | Facility: HOSPITAL | Age: 77
End: 2022-01-01

## 2022-01-01 ENCOUNTER — APPOINTMENT (INPATIENT)
Dept: NON INVASIVE DIAGNOSTICS | Facility: HOSPITAL | Age: 77
End: 2022-01-01
Attending: INTERNAL MEDICINE

## 2022-01-01 ENCOUNTER — HOSPITAL ENCOUNTER (INPATIENT)
Facility: HOSPITAL | Age: 77
LOS: 63 days | End: 2022-11-02
Attending: EMERGENCY MEDICINE | Admitting: INTERNAL MEDICINE

## 2022-01-01 ENCOUNTER — APPOINTMENT (INPATIENT)
Dept: RADIOLOGY | Facility: HOSPITAL | Age: 77
End: 2022-01-01
Attending: HOSPITALIST

## 2022-01-01 ENCOUNTER — APPOINTMENT (OUTPATIENT)
Dept: RADIOLOGY | Facility: HOSPITAL | Age: 77
End: 2022-01-01
Attending: INTERNAL MEDICINE

## 2022-01-01 ENCOUNTER — APPOINTMENT (EMERGENCY)
Dept: RADIOLOGY | Facility: HOSPITAL | Age: 77
End: 2022-01-01

## 2022-01-01 VITALS
WEIGHT: 109 LBS | HEIGHT: 64 IN | OXYGEN SATURATION: 92 % | BODY MASS INDEX: 18.61 KG/M2 | DIASTOLIC BLOOD PRESSURE: 70 MMHG | HEART RATE: 95 BPM | RESPIRATION RATE: 15 BRPM | TEMPERATURE: 99.1 F | SYSTOLIC BLOOD PRESSURE: 144 MMHG

## 2022-01-01 DIAGNOSIS — E44.0 MODERATE PROTEIN-CALORIE MALNUTRITION (HCC): ICD-10-CM

## 2022-01-01 DIAGNOSIS — A41.9 SEPSIS (HCC): ICD-10-CM

## 2022-01-01 DIAGNOSIS — R19.7 DIARRHEA OF PRESUMED INFECTIOUS ORIGIN: ICD-10-CM

## 2022-01-01 DIAGNOSIS — T85.848S PAIN AROUND PEG TUBE SITE, SEQUELA: ICD-10-CM

## 2022-01-01 DIAGNOSIS — D64.9 ANEMIA, UNSPECIFIED TYPE: ICD-10-CM

## 2022-01-01 DIAGNOSIS — R47.02 DYSPHASIA: ICD-10-CM

## 2022-01-01 DIAGNOSIS — E87.1 HYPONATREMIA: ICD-10-CM

## 2022-01-01 DIAGNOSIS — F31.81 BIPOLAR 2 DISORDER (HCC): ICD-10-CM

## 2022-01-01 DIAGNOSIS — I95.9 HYPOTENSION: ICD-10-CM

## 2022-01-01 DIAGNOSIS — R78.81 BACTEREMIA: ICD-10-CM

## 2022-01-01 DIAGNOSIS — Z71.89 GOALS OF CARE, COUNSELING/DISCUSSION: ICD-10-CM

## 2022-01-01 DIAGNOSIS — A04.72 C. DIFFICILE DIARRHEA: Primary | ICD-10-CM

## 2022-01-01 DIAGNOSIS — N17.9 AKI (ACUTE KIDNEY INJURY) (HCC): ICD-10-CM

## 2022-01-01 DIAGNOSIS — E03.9 HYPOTHYROIDISM, UNSPECIFIED TYPE: ICD-10-CM

## 2022-01-01 DIAGNOSIS — G93.40 ACUTE ENCEPHALOPATHY: ICD-10-CM

## 2022-01-01 DIAGNOSIS — K94.23 PEG TUBE MALFUNCTION (HCC): ICD-10-CM

## 2022-01-01 DIAGNOSIS — R78.81 GRAM-POSITIVE BACTEREMIA: ICD-10-CM

## 2022-01-01 LAB
25(OH)D3 SERPL-MCNC: 43 NG/ML (ref 30–100)
ABO GROUP BLD BPU: NORMAL
ABO GROUP BLD BPU: NORMAL
ABO GROUP BLD: NORMAL
ALBUMIN SERPL BCP-MCNC: 1.4 G/DL (ref 3.5–5)
ALBUMIN SERPL BCP-MCNC: 1.5 G/DL (ref 3.5–5)
ALBUMIN SERPL BCP-MCNC: 1.6 G/DL (ref 3.5–5)
ALBUMIN SERPL BCP-MCNC: 1.7 G/DL (ref 3.5–5)
ALBUMIN SERPL BCP-MCNC: 1.8 G/DL (ref 3.5–5)
ALBUMIN SERPL BCP-MCNC: 1.9 G/DL (ref 3.5–5)
ALBUMIN SERPL BCP-MCNC: 2 G/DL (ref 3.5–5)
ALBUMIN SERPL BCP-MCNC: 2.1 G/DL (ref 3.5–5)
ALBUMIN SERPL BCP-MCNC: 2.2 G/DL (ref 3.5–5)
ALBUMIN SERPL BCP-MCNC: 2.3 G/DL (ref 3.5–5)
ALBUMIN SERPL BCP-MCNC: 2.3 G/DL (ref 3.5–5)
ALBUMIN SERPL BCP-MCNC: 2.4 G/DL (ref 3.5–5)
ALP SERPL-CCNC: 101 U/L (ref 46–116)
ALP SERPL-CCNC: 104 U/L (ref 46–116)
ALP SERPL-CCNC: 130 U/L (ref 46–116)
ALP SERPL-CCNC: 141 U/L (ref 46–116)
ALP SERPL-CCNC: 144 U/L (ref 46–116)
ALP SERPL-CCNC: 148 U/L (ref 46–116)
ALP SERPL-CCNC: 149 U/L (ref 46–116)
ALP SERPL-CCNC: 156 U/L (ref 46–116)
ALP SERPL-CCNC: 161 U/L (ref 46–116)
ALP SERPL-CCNC: 176 U/L (ref 46–116)
ALP SERPL-CCNC: 177 U/L (ref 46–116)
ALP SERPL-CCNC: 181 U/L (ref 46–116)
ALP SERPL-CCNC: 193 U/L (ref 46–116)
ALP SERPL-CCNC: 199 U/L (ref 46–116)
ALP SERPL-CCNC: 206 U/L (ref 46–116)
ALP SERPL-CCNC: 211 U/L (ref 46–116)
ALP SERPL-CCNC: 211 U/L (ref 46–116)
ALP SERPL-CCNC: 226 U/L (ref 46–116)
ALP SERPL-CCNC: 228 U/L (ref 46–116)
ALP SERPL-CCNC: 87 U/L (ref 46–116)
ALP SERPL-CCNC: 92 U/L (ref 46–116)
ALP SERPL-CCNC: 97 U/L (ref 46–116)
ALT SERPL W P-5'-P-CCNC: 115 U/L (ref 12–78)
ALT SERPL W P-5'-P-CCNC: 14 U/L (ref 12–78)
ALT SERPL W P-5'-P-CCNC: 15 U/L (ref 12–78)
ALT SERPL W P-5'-P-CCNC: 17 U/L (ref 12–78)
ALT SERPL W P-5'-P-CCNC: 17 U/L (ref 12–78)
ALT SERPL W P-5'-P-CCNC: 183 U/L (ref 12–78)
ALT SERPL W P-5'-P-CCNC: 19 U/L (ref 12–78)
ALT SERPL W P-5'-P-CCNC: 23 U/L (ref 12–78)
ALT SERPL W P-5'-P-CCNC: 30 U/L (ref 12–78)
ALT SERPL W P-5'-P-CCNC: 37 U/L (ref 12–78)
ALT SERPL W P-5'-P-CCNC: 39 U/L (ref 12–78)
ALT SERPL W P-5'-P-CCNC: 42 U/L (ref 12–78)
ALT SERPL W P-5'-P-CCNC: 42 U/L (ref 12–78)
ALT SERPL W P-5'-P-CCNC: 47 U/L (ref 12–78)
ALT SERPL W P-5'-P-CCNC: 53 U/L (ref 12–78)
ALT SERPL W P-5'-P-CCNC: 56 U/L (ref 12–78)
ALT SERPL W P-5'-P-CCNC: 61 U/L (ref 12–78)
ALT SERPL W P-5'-P-CCNC: 66 U/L (ref 12–78)
ALT SERPL W P-5'-P-CCNC: 73 U/L (ref 12–78)
ALT SERPL W P-5'-P-CCNC: 80 U/L (ref 12–78)
ALT SERPL W P-5'-P-CCNC: 83 U/L (ref 12–78)
ALT SERPL W P-5'-P-CCNC: 87 U/L (ref 12–78)
AMORPH URATE CRY URNS QL MICRO: ABNORMAL
ANION GAP SERPL CALCULATED.3IONS-SCNC: 3 MMOL/L (ref 4–13)
ANION GAP SERPL CALCULATED.3IONS-SCNC: 4 MMOL/L (ref 4–13)
ANION GAP SERPL CALCULATED.3IONS-SCNC: 5 MMOL/L (ref 4–13)
ANION GAP SERPL CALCULATED.3IONS-SCNC: 6 MMOL/L (ref 4–13)
ANION GAP SERPL CALCULATED.3IONS-SCNC: 7 MMOL/L (ref 4–13)
ANION GAP SERPL CALCULATED.3IONS-SCNC: 8 MMOL/L (ref 4–13)
ANION GAP SERPL CALCULATED.3IONS-SCNC: 8 MMOL/L (ref 4–13)
ANION GAP SERPL CALCULATED.3IONS-SCNC: 9 MMOL/L (ref 4–13)
ANISOCYTOSIS BLD QL SMEAR: PRESENT
APICAL FOUR CHAMBER EJECTION FRACTION: 57 %
APTT PPP: 34 SECONDS (ref 23–37)
AST SERPL W P-5'-P-CCNC: 115 U/L (ref 5–45)
AST SERPL W P-5'-P-CCNC: 129 U/L (ref 5–45)
AST SERPL W P-5'-P-CCNC: 146 U/L (ref 5–45)
AST SERPL W P-5'-P-CCNC: 17 U/L (ref 5–45)
AST SERPL W P-5'-P-CCNC: 20 U/L (ref 5–45)
AST SERPL W P-5'-P-CCNC: 22 U/L (ref 5–45)
AST SERPL W P-5'-P-CCNC: 23 U/L (ref 5–45)
AST SERPL W P-5'-P-CCNC: 28 U/L (ref 5–45)
AST SERPL W P-5'-P-CCNC: 30 U/L (ref 5–45)
AST SERPL W P-5'-P-CCNC: 31 U/L (ref 5–45)
AST SERPL W P-5'-P-CCNC: 41 U/L (ref 5–45)
AST SERPL W P-5'-P-CCNC: 41 U/L (ref 5–45)
AST SERPL W P-5'-P-CCNC: 44 U/L (ref 5–45)
AST SERPL W P-5'-P-CCNC: 50 U/L (ref 5–45)
AST SERPL W P-5'-P-CCNC: 50 U/L (ref 5–45)
AST SERPL W P-5'-P-CCNC: 51 U/L (ref 5–45)
AST SERPL W P-5'-P-CCNC: 52 U/L (ref 5–45)
AST SERPL W P-5'-P-CCNC: 53 U/L (ref 5–45)
AST SERPL W P-5'-P-CCNC: 63 U/L (ref 5–45)
AST SERPL W P-5'-P-CCNC: 72 U/L (ref 5–45)
AST SERPL W P-5'-P-CCNC: 76 U/L (ref 5–45)
AST SERPL W P-5'-P-CCNC: 84 U/L (ref 5–45)
ATRIAL RATE: 99 BPM
BACTERIA BLD CULT: ABNORMAL
BACTERIA BLD CULT: ABNORMAL
BACTERIA BLD CULT: NORMAL
BACTERIA UR CULT: ABNORMAL
BACTERIA UR QL AUTO: ABNORMAL /HPF
BASOPHILS # BLD AUTO: 0.02 THOUSANDS/ÂΜL (ref 0–0.1)
BASOPHILS # BLD AUTO: 0.02 THOUSANDS/ÂΜL (ref 0–0.1)
BASOPHILS # BLD AUTO: 0.03 THOUSANDS/ÂΜL (ref 0–0.1)
BASOPHILS # BLD AUTO: 0.04 THOUSANDS/ÂΜL (ref 0–0.1)
BASOPHILS # BLD AUTO: 0.05 THOUSANDS/ÂΜL (ref 0–0.1)
BASOPHILS # BLD AUTO: 0.06 THOUSANDS/ÂΜL (ref 0–0.1)
BASOPHILS # BLD AUTO: 0.13 THOUSANDS/ÂΜL (ref 0–0.1)
BASOPHILS # BLD MANUAL: 0 THOUSAND/UL (ref 0–0.1)
BASOPHILS NFR BLD AUTO: 0 % (ref 0–1)
BASOPHILS NFR BLD AUTO: 1 % (ref 0–1)
BASOPHILS NFR MAR MANUAL: 0 % (ref 0–1)
BILIRUB DIRECT SERPL-MCNC: 0.11 MG/DL (ref 0–0.2)
BILIRUB DIRECT SERPL-MCNC: 0.12 MG/DL (ref 0–0.2)
BILIRUB DIRECT SERPL-MCNC: 0.12 MG/DL (ref 0–0.2)
BILIRUB DIRECT SERPL-MCNC: 0.13 MG/DL (ref 0–0.2)
BILIRUB DIRECT SERPL-MCNC: 0.16 MG/DL (ref 0–0.2)
BILIRUB SERPL-MCNC: 0.15 MG/DL (ref 0.2–1)
BILIRUB SERPL-MCNC: 0.18 MG/DL (ref 0.2–1)
BILIRUB SERPL-MCNC: 0.19 MG/DL (ref 0.2–1)
BILIRUB SERPL-MCNC: 0.2 MG/DL (ref 0.2–1)
BILIRUB SERPL-MCNC: 0.2 MG/DL (ref 0.2–1)
BILIRUB SERPL-MCNC: 0.21 MG/DL (ref 0.2–1)
BILIRUB SERPL-MCNC: 0.23 MG/DL (ref 0.2–1)
BILIRUB SERPL-MCNC: 0.23 MG/DL (ref 0.2–1)
BILIRUB SERPL-MCNC: 0.24 MG/DL (ref 0.2–1)
BILIRUB SERPL-MCNC: 0.24 MG/DL (ref 0.2–1)
BILIRUB SERPL-MCNC: 0.25 MG/DL (ref 0.2–1)
BILIRUB SERPL-MCNC: 0.28 MG/DL (ref 0.2–1)
BILIRUB SERPL-MCNC: 0.29 MG/DL (ref 0.2–1)
BILIRUB SERPL-MCNC: 0.29 MG/DL (ref 0.2–1)
BILIRUB SERPL-MCNC: 0.3 MG/DL (ref 0.2–1)
BILIRUB SERPL-MCNC: 0.32 MG/DL (ref 0.2–1)
BILIRUB SERPL-MCNC: 0.34 MG/DL (ref 0.2–1)
BILIRUB SERPL-MCNC: 0.35 MG/DL (ref 0.2–1)
BILIRUB SERPL-MCNC: 0.37 MG/DL (ref 0.2–1)
BILIRUB SERPL-MCNC: 0.38 MG/DL (ref 0.2–1)
BILIRUB SERPL-MCNC: 0.4 MG/DL (ref 0.2–1)
BILIRUB SERPL-MCNC: 0.41 MG/DL (ref 0.2–1)
BILIRUB UR QL STRIP: NEGATIVE
BLD GP AB SCN SERPL QL: NEGATIVE
BPU ID: NORMAL
BPU ID: NORMAL
BUN SERPL-MCNC: 13 MG/DL (ref 5–25)
BUN SERPL-MCNC: 14 MG/DL (ref 5–25)
BUN SERPL-MCNC: 15 MG/DL (ref 5–25)
BUN SERPL-MCNC: 16 MG/DL (ref 5–25)
BUN SERPL-MCNC: 18 MG/DL (ref 5–25)
BUN SERPL-MCNC: 19 MG/DL (ref 5–25)
BUN SERPL-MCNC: 20 MG/DL (ref 5–25)
BUN SERPL-MCNC: 20 MG/DL (ref 5–25)
BUN SERPL-MCNC: 21 MG/DL (ref 5–25)
BUN SERPL-MCNC: 22 MG/DL (ref 5–25)
BUN SERPL-MCNC: 23 MG/DL (ref 5–25)
BUN SERPL-MCNC: 23 MG/DL (ref 5–25)
BUN SERPL-MCNC: 24 MG/DL (ref 5–25)
BUN SERPL-MCNC: 25 MG/DL (ref 5–25)
BUN SERPL-MCNC: 25 MG/DL (ref 5–25)
BUN SERPL-MCNC: 26 MG/DL (ref 5–25)
BUN SERPL-MCNC: 27 MG/DL (ref 5–25)
BUN SERPL-MCNC: 27 MG/DL (ref 5–25)
BUN SERPL-MCNC: 28 MG/DL (ref 5–25)
BUN SERPL-MCNC: 28 MG/DL (ref 5–25)
BUN SERPL-MCNC: 29 MG/DL (ref 5–25)
BUN SERPL-MCNC: 30 MG/DL (ref 5–25)
BUN SERPL-MCNC: 31 MG/DL (ref 5–25)
BUN SERPL-MCNC: 32 MG/DL (ref 5–25)
BUN SERPL-MCNC: 33 MG/DL (ref 5–25)
BUN SERPL-MCNC: 33 MG/DL (ref 5–25)
BUN SERPL-MCNC: 34 MG/DL (ref 5–25)
BUN SERPL-MCNC: 34 MG/DL (ref 5–25)
BUN SERPL-MCNC: 35 MG/DL (ref 5–25)
BUN SERPL-MCNC: 35 MG/DL (ref 5–25)
BUN SERPL-MCNC: 36 MG/DL (ref 5–25)
BUN SERPL-MCNC: 36 MG/DL (ref 5–25)
BUN SERPL-MCNC: 37 MG/DL (ref 5–25)
BUN SERPL-MCNC: 40 MG/DL (ref 5–25)
BUN SERPL-MCNC: 41 MG/DL (ref 5–25)
BUN SERPL-MCNC: 43 MG/DL (ref 5–25)
BUN SERPL-MCNC: 43 MG/DL (ref 5–25)
BUN SERPL-MCNC: 44 MG/DL (ref 5–25)
BUN SERPL-MCNC: 45 MG/DL (ref 5–25)
C DIFF TOX A+B STL QL IA: NEGATIVE
C DIFF TOX GENS STL QL NAA+PROBE: NEGATIVE
C DIFF TOX GENS STL QL NAA+PROBE: POSITIVE
CA-I BLD-SCNC: 1.34 MMOL/L (ref 1.12–1.32)
CA-I BLD-SCNC: 1.37 MMOL/L (ref 1.12–1.32)
CALCIUM ALBUM COR SERPL-MCNC: 10 MG/DL (ref 8.3–10.1)
CALCIUM ALBUM COR SERPL-MCNC: 10.1 MG/DL (ref 8.3–10.1)
CALCIUM ALBUM COR SERPL-MCNC: 10.3 MG/DL (ref 8.3–10.1)
CALCIUM ALBUM COR SERPL-MCNC: 10.3 MG/DL (ref 8.3–10.1)
CALCIUM ALBUM COR SERPL-MCNC: 10.4 MG/DL (ref 8.3–10.1)
CALCIUM ALBUM COR SERPL-MCNC: 10.6 MG/DL (ref 8.3–10.1)
CALCIUM ALBUM COR SERPL-MCNC: 10.6 MG/DL (ref 8.3–10.1)
CALCIUM ALBUM COR SERPL-MCNC: 10.7 MG/DL (ref 8.3–10.1)
CALCIUM ALBUM COR SERPL-MCNC: 10.8 MG/DL (ref 8.3–10.1)
CALCIUM ALBUM COR SERPL-MCNC: 11.1 MG/DL (ref 8.3–10.1)
CALCIUM ALBUM COR SERPL-MCNC: 11.3 MG/DL (ref 8.3–10.1)
CALCIUM ALBUM COR SERPL-MCNC: 11.4 MG/DL (ref 8.3–10.1)
CALCIUM ALBUM COR SERPL-MCNC: 11.6 MG/DL (ref 8.3–10.1)
CALCIUM ALBUM COR SERPL-MCNC: 11.7 MG/DL (ref 8.3–10.1)
CALCIUM ALBUM COR SERPL-MCNC: 11.7 MG/DL (ref 8.3–10.1)
CALCIUM ALBUM COR SERPL-MCNC: 9.7 MG/DL (ref 8.3–10.1)
CALCIUM ALBUM COR SERPL-MCNC: 9.8 MG/DL (ref 8.3–10.1)
CALCIUM ALBUM COR SERPL-MCNC: 9.8 MG/DL (ref 8.3–10.1)
CALCIUM SERPL-MCNC: 10 MG/DL (ref 8.3–10.1)
CALCIUM SERPL-MCNC: 10 MG/DL (ref 8.3–10.1)
CALCIUM SERPL-MCNC: 10.2 MG/DL (ref 8.3–10.1)
CALCIUM SERPL-MCNC: 10.2 MG/DL (ref 8.3–10.1)
CALCIUM SERPL-MCNC: 10.4 MG/DL (ref 8.3–10.1)
CALCIUM SERPL-MCNC: 10.4 MG/DL (ref 8.3–10.1)
CALCIUM SERPL-MCNC: 7.3 MG/DL (ref 8.3–10.1)
CALCIUM SERPL-MCNC: 7.7 MG/DL (ref 8.3–10.1)
CALCIUM SERPL-MCNC: 7.9 MG/DL (ref 8.3–10.1)
CALCIUM SERPL-MCNC: 8 MG/DL (ref 8.3–10.1)
CALCIUM SERPL-MCNC: 8.1 MG/DL (ref 8.3–10.1)
CALCIUM SERPL-MCNC: 8.2 MG/DL (ref 8.3–10.1)
CALCIUM SERPL-MCNC: 8.3 MG/DL (ref 8.3–10.1)
CALCIUM SERPL-MCNC: 8.4 MG/DL (ref 8.3–10.1)
CALCIUM SERPL-MCNC: 8.5 MG/DL (ref 8.3–10.1)
CALCIUM SERPL-MCNC: 8.5 MG/DL (ref 8.3–10.1)
CALCIUM SERPL-MCNC: 8.6 MG/DL (ref 8.3–10.1)
CALCIUM SERPL-MCNC: 8.6 MG/DL (ref 8.3–10.1)
CALCIUM SERPL-MCNC: 8.7 MG/DL (ref 8.3–10.1)
CALCIUM SERPL-MCNC: 8.8 MG/DL (ref 8.3–10.1)
CALCIUM SERPL-MCNC: 8.9 MG/DL (ref 8.3–10.1)
CALCIUM SERPL-MCNC: 9.1 MG/DL (ref 8.3–10.1)
CALCIUM SERPL-MCNC: 9.2 MG/DL (ref 8.3–10.1)
CALCIUM SERPL-MCNC: 9.3 MG/DL (ref 8.3–10.1)
CALCIUM SERPL-MCNC: 9.4 MG/DL (ref 8.3–10.1)
CALCIUM SERPL-MCNC: 9.7 MG/DL (ref 8.3–10.1)
CALCIUM SERPL-MCNC: 9.7 MG/DL (ref 8.3–10.1)
CAOX CRY URNS QL MICRO: ABNORMAL /HPF
CHLORIDE SERPL-SCNC: 100 MMOL/L (ref 96–108)
CHLORIDE SERPL-SCNC: 101 MMOL/L (ref 96–108)
CHLORIDE SERPL-SCNC: 102 MMOL/L (ref 96–108)
CHLORIDE SERPL-SCNC: 103 MMOL/L (ref 96–108)
CHLORIDE SERPL-SCNC: 104 MMOL/L (ref 96–108)
CHLORIDE SERPL-SCNC: 106 MMOL/L (ref 96–108)
CHLORIDE SERPL-SCNC: 106 MMOL/L (ref 96–108)
CHLORIDE SERPL-SCNC: 107 MMOL/L (ref 96–108)
CHLORIDE SERPL-SCNC: 108 MMOL/L (ref 96–108)
CHLORIDE SERPL-SCNC: 108 MMOL/L (ref 96–108)
CHLORIDE SERPL-SCNC: 109 MMOL/L (ref 96–108)
CHLORIDE SERPL-SCNC: 111 MMOL/L (ref 96–108)
CHLORIDE SERPL-SCNC: 112 MMOL/L (ref 96–108)
CHLORIDE SERPL-SCNC: 112 MMOL/L (ref 96–108)
CHLORIDE SERPL-SCNC: 97 MMOL/L (ref 96–108)
CHLORIDE SERPL-SCNC: 98 MMOL/L (ref 96–108)
CHLORIDE SERPL-SCNC: 98 MMOL/L (ref 96–108)
CHLORIDE SERPL-SCNC: 99 MMOL/L (ref 96–108)
CHLORIDE SERPL-SCNC: 99 MMOL/L (ref 96–108)
CHOLEST SERPL-MCNC: 83 MG/DL
CK SERPL-CCNC: 19 U/L (ref 26–192)
CK SERPL-CCNC: 9 U/L (ref 26–192)
CLARITY UR: ABNORMAL
CLARITY UR: ABNORMAL
CLARITY UR: CLEAR
CO2 SERPL-SCNC: 19 MMOL/L (ref 21–32)
CO2 SERPL-SCNC: 21 MMOL/L (ref 21–32)
CO2 SERPL-SCNC: 22 MMOL/L (ref 21–32)
CO2 SERPL-SCNC: 22 MMOL/L (ref 21–32)
CO2 SERPL-SCNC: 23 MMOL/L (ref 21–32)
CO2 SERPL-SCNC: 24 MMOL/L (ref 21–32)
CO2 SERPL-SCNC: 25 MMOL/L (ref 21–32)
CO2 SERPL-SCNC: 26 MMOL/L (ref 21–32)
CO2 SERPL-SCNC: 27 MMOL/L (ref 21–32)
CO2 SERPL-SCNC: 28 MMOL/L (ref 21–32)
COLOR UR: ABNORMAL
COLOR UR: ABNORMAL
COLOR UR: YELLOW
CORTIS AM PEAK SERPL-MCNC: 11.5 UG/DL (ref 4.2–22.4)
CORTIS AM PEAK SERPL-MCNC: 15.1 UG/DL (ref 4.2–22.4)
CREAT SERPL-MCNC: 1.12 MG/DL (ref 0.6–1.3)
CREAT SERPL-MCNC: 1.12 MG/DL (ref 0.6–1.3)
CREAT SERPL-MCNC: 1.14 MG/DL (ref 0.6–1.3)
CREAT SERPL-MCNC: 1.19 MG/DL (ref 0.6–1.3)
CREAT SERPL-MCNC: 1.23 MG/DL (ref 0.6–1.3)
CREAT SERPL-MCNC: 1.3 MG/DL (ref 0.6–1.3)
CREAT SERPL-MCNC: 1.32 MG/DL (ref 0.6–1.3)
CREAT SERPL-MCNC: 1.32 MG/DL (ref 0.6–1.3)
CREAT SERPL-MCNC: 1.35 MG/DL (ref 0.6–1.3)
CREAT SERPL-MCNC: 1.36 MG/DL (ref 0.6–1.3)
CREAT SERPL-MCNC: 1.37 MG/DL (ref 0.6–1.3)
CREAT SERPL-MCNC: 1.37 MG/DL (ref 0.6–1.3)
CREAT SERPL-MCNC: 1.39 MG/DL (ref 0.6–1.3)
CREAT SERPL-MCNC: 1.39 MG/DL (ref 0.6–1.3)
CREAT SERPL-MCNC: 1.42 MG/DL (ref 0.6–1.3)
CREAT SERPL-MCNC: 1.43 MG/DL (ref 0.6–1.3)
CREAT SERPL-MCNC: 1.45 MG/DL (ref 0.6–1.3)
CREAT SERPL-MCNC: 1.47 MG/DL (ref 0.6–1.3)
CREAT SERPL-MCNC: 1.47 MG/DL (ref 0.6–1.3)
CREAT SERPL-MCNC: 1.48 MG/DL (ref 0.6–1.3)
CREAT SERPL-MCNC: 1.49 MG/DL (ref 0.6–1.3)
CREAT SERPL-MCNC: 1.51 MG/DL (ref 0.6–1.3)
CREAT SERPL-MCNC: 1.51 MG/DL (ref 0.6–1.3)
CREAT SERPL-MCNC: 1.52 MG/DL (ref 0.6–1.3)
CREAT SERPL-MCNC: 1.52 MG/DL (ref 0.6–1.3)
CREAT SERPL-MCNC: 1.53 MG/DL (ref 0.6–1.3)
CREAT SERPL-MCNC: 1.54 MG/DL (ref 0.6–1.3)
CREAT SERPL-MCNC: 1.55 MG/DL (ref 0.6–1.3)
CREAT SERPL-MCNC: 1.57 MG/DL (ref 0.6–1.3)
CREAT SERPL-MCNC: 1.58 MG/DL (ref 0.6–1.3)
CREAT SERPL-MCNC: 1.61 MG/DL (ref 0.6–1.3)
CREAT SERPL-MCNC: 1.62 MG/DL (ref 0.6–1.3)
CREAT SERPL-MCNC: 1.65 MG/DL (ref 0.6–1.3)
CREAT SERPL-MCNC: 1.71 MG/DL (ref 0.6–1.3)
CREAT SERPL-MCNC: 1.72 MG/DL (ref 0.6–1.3)
CREAT SERPL-MCNC: 1.74 MG/DL (ref 0.6–1.3)
CREAT SERPL-MCNC: 1.74 MG/DL (ref 0.6–1.3)
CREAT SERPL-MCNC: 1.77 MG/DL (ref 0.6–1.3)
CREAT SERPL-MCNC: 1.8 MG/DL (ref 0.6–1.3)
CREAT SERPL-MCNC: 1.82 MG/DL (ref 0.6–1.3)
CREAT SERPL-MCNC: 1.84 MG/DL (ref 0.6–1.3)
CREAT SERPL-MCNC: 1.9 MG/DL (ref 0.6–1.3)
CREAT SERPL-MCNC: 1.9 MG/DL (ref 0.6–1.3)
CREAT SERPL-MCNC: 2 MG/DL (ref 0.6–1.3)
CREAT UR-MCNC: 31.7 MG/DL
CREAT UR-MCNC: 47.1 MG/DL
CROSSMATCH: NORMAL
CROSSMATCH: NORMAL
CRP SERPL QL: 56.5 MG/L
E WAVE DECELERATION TIME: 227 MS
EOSINOPHIL # BLD AUTO: 0.78 THOUSAND/ÂΜL (ref 0–0.61)
EOSINOPHIL # BLD AUTO: 0.79 THOUSAND/ÂΜL (ref 0–0.61)
EOSINOPHIL # BLD AUTO: 0.88 THOUSAND/ÂΜL (ref 0–0.61)
EOSINOPHIL # BLD AUTO: 0.94 THOUSAND/ÂΜL (ref 0–0.61)
EOSINOPHIL # BLD AUTO: 0.95 THOUSAND/ÂΜL (ref 0–0.61)
EOSINOPHIL # BLD AUTO: 0.97 THOUSAND/ÂΜL (ref 0–0.61)
EOSINOPHIL # BLD AUTO: 0.98 THOUSAND/ÂΜL (ref 0–0.61)
EOSINOPHIL # BLD AUTO: 0.98 THOUSAND/ÂΜL (ref 0–0.61)
EOSINOPHIL # BLD AUTO: 1.01 THOUSAND/ÂΜL (ref 0–0.61)
EOSINOPHIL # BLD AUTO: 1.11 THOUSAND/ÂΜL (ref 0–0.61)
EOSINOPHIL # BLD AUTO: 1.11 THOUSAND/ÂΜL (ref 0–0.61)
EOSINOPHIL # BLD AUTO: 1.12 THOUSAND/ÂΜL (ref 0–0.61)
EOSINOPHIL # BLD AUTO: 1.16 THOUSAND/ÂΜL (ref 0–0.61)
EOSINOPHIL # BLD AUTO: 1.16 THOUSAND/ÂΜL (ref 0–0.61)
EOSINOPHIL # BLD AUTO: 1.19 THOUSAND/ÂΜL (ref 0–0.61)
EOSINOPHIL # BLD AUTO: 1.25 THOUSAND/ÂΜL (ref 0–0.61)
EOSINOPHIL # BLD AUTO: 1.28 THOUSAND/ÂΜL (ref 0–0.61)
EOSINOPHIL # BLD AUTO: 1.31 THOUSAND/ÂΜL (ref 0–0.61)
EOSINOPHIL # BLD AUTO: 1.47 THOUSAND/ÂΜL (ref 0–0.61)
EOSINOPHIL # BLD AUTO: 1.52 THOUSAND/ÂΜL (ref 0–0.61)
EOSINOPHIL # BLD AUTO: 1.53 THOUSAND/ÂΜL (ref 0–0.61)
EOSINOPHIL # BLD AUTO: 1.58 THOUSAND/ÂΜL (ref 0–0.61)
EOSINOPHIL # BLD AUTO: 1.61 THOUSAND/ÂΜL (ref 0–0.61)
EOSINOPHIL # BLD AUTO: 1.84 THOUSAND/ÂΜL (ref 0–0.61)
EOSINOPHIL # BLD AUTO: 2.07 THOUSAND/ÂΜL (ref 0–0.61)
EOSINOPHIL # BLD MANUAL: 0.83 THOUSAND/UL (ref 0–0.4)
EOSINOPHIL NFR BLD AUTO: 10 % (ref 0–6)
EOSINOPHIL NFR BLD AUTO: 11 % (ref 0–6)
EOSINOPHIL NFR BLD AUTO: 12 % (ref 0–6)
EOSINOPHIL NFR BLD AUTO: 13 % (ref 0–6)
EOSINOPHIL NFR BLD AUTO: 15 % (ref 0–6)
EOSINOPHIL NFR BLD AUTO: 15 % (ref 0–6)
EOSINOPHIL NFR BLD AUTO: 16 % (ref 0–6)
EOSINOPHIL NFR BLD AUTO: 3 % (ref 0–6)
EOSINOPHIL NFR BLD AUTO: 4 % (ref 0–6)
EOSINOPHIL NFR BLD AUTO: 6 % (ref 0–6)
EOSINOPHIL NFR BLD AUTO: 7 % (ref 0–6)
EOSINOPHIL NFR BLD AUTO: 8 % (ref 0–6)
EOSINOPHIL NFR BLD AUTO: 8 % (ref 0–6)
EOSINOPHIL NFR BLD AUTO: 9 % (ref 0–6)
EOSINOPHIL NFR BLD AUTO: 9 % (ref 0–6)
EOSINOPHIL NFR BLD MANUAL: 6 % (ref 0–6)
EOSINOPHIL NFR URNS MANUAL: 5 %
ERYTHROCYTE [DISTWIDTH] IN BLOOD BY AUTOMATED COUNT: 17.6 % (ref 11.6–15.1)
ERYTHROCYTE [DISTWIDTH] IN BLOOD BY AUTOMATED COUNT: 17.7 % (ref 11.6–15.1)
ERYTHROCYTE [DISTWIDTH] IN BLOOD BY AUTOMATED COUNT: 17.7 % (ref 11.6–15.1)
ERYTHROCYTE [DISTWIDTH] IN BLOOD BY AUTOMATED COUNT: 17.8 % (ref 11.6–15.1)
ERYTHROCYTE [DISTWIDTH] IN BLOOD BY AUTOMATED COUNT: 17.9 % (ref 11.6–15.1)
ERYTHROCYTE [DISTWIDTH] IN BLOOD BY AUTOMATED COUNT: 18.1 % (ref 11.6–15.1)
ERYTHROCYTE [DISTWIDTH] IN BLOOD BY AUTOMATED COUNT: 18.2 % (ref 11.6–15.1)
ERYTHROCYTE [DISTWIDTH] IN BLOOD BY AUTOMATED COUNT: 18.4 % (ref 11.6–15.1)
ERYTHROCYTE [DISTWIDTH] IN BLOOD BY AUTOMATED COUNT: 18.4 % (ref 11.6–15.1)
ERYTHROCYTE [DISTWIDTH] IN BLOOD BY AUTOMATED COUNT: 18.5 % (ref 11.6–15.1)
ERYTHROCYTE [DISTWIDTH] IN BLOOD BY AUTOMATED COUNT: 18.6 % (ref 11.6–15.1)
ERYTHROCYTE [DISTWIDTH] IN BLOOD BY AUTOMATED COUNT: 18.8 % (ref 11.6–15.1)
ERYTHROCYTE [DISTWIDTH] IN BLOOD BY AUTOMATED COUNT: 18.9 % (ref 11.6–15.1)
ERYTHROCYTE [DISTWIDTH] IN BLOOD BY AUTOMATED COUNT: 19 % (ref 11.6–15.1)
ERYTHROCYTE [DISTWIDTH] IN BLOOD BY AUTOMATED COUNT: 19.2 % (ref 11.6–15.1)
ERYTHROCYTE [DISTWIDTH] IN BLOOD BY AUTOMATED COUNT: 19.4 % (ref 11.6–15.1)
ERYTHROCYTE [DISTWIDTH] IN BLOOD BY AUTOMATED COUNT: 19.5 % (ref 11.6–15.1)
ERYTHROCYTE [DISTWIDTH] IN BLOOD BY AUTOMATED COUNT: 19.6 % (ref 11.6–15.1)
ERYTHROCYTE [DISTWIDTH] IN BLOOD BY AUTOMATED COUNT: 19.6 % (ref 11.6–15.1)
ERYTHROCYTE [DISTWIDTH] IN BLOOD BY AUTOMATED COUNT: 19.7 % (ref 11.6–15.1)
ERYTHROCYTE [DISTWIDTH] IN BLOOD BY AUTOMATED COUNT: 20 % (ref 11.6–15.1)
ERYTHROCYTE [DISTWIDTH] IN BLOOD BY AUTOMATED COUNT: 20.5 % (ref 11.6–15.1)
ERYTHROCYTE [DISTWIDTH] IN BLOOD BY AUTOMATED COUNT: 20.8 % (ref 11.6–15.1)
ERYTHROCYTE [DISTWIDTH] IN BLOOD BY AUTOMATED COUNT: 21.2 % (ref 11.6–15.1)
ERYTHROCYTE [DISTWIDTH] IN BLOOD BY AUTOMATED COUNT: 21.8 % (ref 11.6–15.1)
ERYTHROCYTE [SEDIMENTATION RATE] IN BLOOD: 46 MM/HOUR (ref 0–29)
FERRITIN SERPL-MCNC: 1139 NG/ML (ref 8–388)
FLUAV RNA RESP QL NAA+PROBE: NEGATIVE
FLUBV RNA RESP QL NAA+PROBE: NEGATIVE
GFR SERPL CREATININE-BSD FRML MDRD: 23 ML/MIN/1.73SQ M
GFR SERPL CREATININE-BSD FRML MDRD: 24 ML/MIN/1.73SQ M
GFR SERPL CREATININE-BSD FRML MDRD: 24 ML/MIN/1.73SQ M
GFR SERPL CREATININE-BSD FRML MDRD: 25 ML/MIN/1.73SQ M
GFR SERPL CREATININE-BSD FRML MDRD: 26 ML/MIN/1.73SQ M
GFR SERPL CREATININE-BSD FRML MDRD: 27 ML/MIN/1.73SQ M
GFR SERPL CREATININE-BSD FRML MDRD: 28 ML/MIN/1.73SQ M
GFR SERPL CREATININE-BSD FRML MDRD: 29 ML/MIN/1.73SQ M
GFR SERPL CREATININE-BSD FRML MDRD: 29 ML/MIN/1.73SQ M
GFR SERPL CREATININE-BSD FRML MDRD: 30 ML/MIN/1.73SQ M
GFR SERPL CREATININE-BSD FRML MDRD: 31 ML/MIN/1.73SQ M
GFR SERPL CREATININE-BSD FRML MDRD: 32 ML/MIN/1.73SQ M
GFR SERPL CREATININE-BSD FRML MDRD: 33 ML/MIN/1.73SQ M
GFR SERPL CREATININE-BSD FRML MDRD: 34 ML/MIN/1.73SQ M
GFR SERPL CREATININE-BSD FRML MDRD: 34 ML/MIN/1.73SQ M
GFR SERPL CREATININE-BSD FRML MDRD: 35 ML/MIN/1.73SQ M
GFR SERPL CREATININE-BSD FRML MDRD: 36 ML/MIN/1.73SQ M
GFR SERPL CREATININE-BSD FRML MDRD: 37 ML/MIN/1.73SQ M
GFR SERPL CREATININE-BSD FRML MDRD: 37 ML/MIN/1.73SQ M
GFR SERPL CREATININE-BSD FRML MDRD: 38 ML/MIN/1.73SQ M
GFR SERPL CREATININE-BSD FRML MDRD: 38 ML/MIN/1.73SQ M
GFR SERPL CREATININE-BSD FRML MDRD: 39 ML/MIN/1.73SQ M
GFR SERPL CREATININE-BSD FRML MDRD: 41 ML/MIN/1.73SQ M
GFR SERPL CREATININE-BSD FRML MDRD: 43 ML/MIN/1.73SQ M
GFR SERPL CREATININE-BSD FRML MDRD: 45 ML/MIN/1.73SQ M
GFR SERPL CREATININE-BSD FRML MDRD: 46 ML/MIN/1.73SQ M
GFR SERPL CREATININE-BSD FRML MDRD: 46 ML/MIN/1.73SQ M
GLUCOSE SERPL-MCNC: 100 MG/DL (ref 65–140)
GLUCOSE SERPL-MCNC: 101 MG/DL (ref 65–140)
GLUCOSE SERPL-MCNC: 102 MG/DL (ref 65–140)
GLUCOSE SERPL-MCNC: 103 MG/DL (ref 65–140)
GLUCOSE SERPL-MCNC: 104 MG/DL (ref 65–140)
GLUCOSE SERPL-MCNC: 105 MG/DL (ref 65–140)
GLUCOSE SERPL-MCNC: 106 MG/DL (ref 65–140)
GLUCOSE SERPL-MCNC: 107 MG/DL (ref 65–140)
GLUCOSE SERPL-MCNC: 108 MG/DL (ref 65–140)
GLUCOSE SERPL-MCNC: 109 MG/DL (ref 65–140)
GLUCOSE SERPL-MCNC: 110 MG/DL (ref 65–140)
GLUCOSE SERPL-MCNC: 111 MG/DL (ref 65–140)
GLUCOSE SERPL-MCNC: 112 MG/DL (ref 65–140)
GLUCOSE SERPL-MCNC: 113 MG/DL (ref 65–140)
GLUCOSE SERPL-MCNC: 114 MG/DL (ref 65–140)
GLUCOSE SERPL-MCNC: 115 MG/DL (ref 65–140)
GLUCOSE SERPL-MCNC: 116 MG/DL (ref 65–140)
GLUCOSE SERPL-MCNC: 117 MG/DL (ref 65–140)
GLUCOSE SERPL-MCNC: 118 MG/DL (ref 65–140)
GLUCOSE SERPL-MCNC: 118 MG/DL (ref 65–140)
GLUCOSE SERPL-MCNC: 119 MG/DL (ref 65–140)
GLUCOSE SERPL-MCNC: 120 MG/DL (ref 65–140)
GLUCOSE SERPL-MCNC: 121 MG/DL (ref 65–140)
GLUCOSE SERPL-MCNC: 122 MG/DL (ref 65–140)
GLUCOSE SERPL-MCNC: 123 MG/DL (ref 65–140)
GLUCOSE SERPL-MCNC: 124 MG/DL (ref 65–140)
GLUCOSE SERPL-MCNC: 125 MG/DL (ref 65–140)
GLUCOSE SERPL-MCNC: 125 MG/DL (ref 65–140)
GLUCOSE SERPL-MCNC: 126 MG/DL (ref 65–140)
GLUCOSE SERPL-MCNC: 126 MG/DL (ref 65–140)
GLUCOSE SERPL-MCNC: 127 MG/DL (ref 65–140)
GLUCOSE SERPL-MCNC: 128 MG/DL (ref 65–140)
GLUCOSE SERPL-MCNC: 129 MG/DL (ref 65–140)
GLUCOSE SERPL-MCNC: 130 MG/DL (ref 65–140)
GLUCOSE SERPL-MCNC: 130 MG/DL (ref 65–140)
GLUCOSE SERPL-MCNC: 132 MG/DL (ref 65–140)
GLUCOSE SERPL-MCNC: 133 MG/DL (ref 65–140)
GLUCOSE SERPL-MCNC: 134 MG/DL (ref 65–140)
GLUCOSE SERPL-MCNC: 134 MG/DL (ref 65–140)
GLUCOSE SERPL-MCNC: 136 MG/DL (ref 65–140)
GLUCOSE SERPL-MCNC: 136 MG/DL (ref 65–140)
GLUCOSE SERPL-MCNC: 137 MG/DL (ref 65–140)
GLUCOSE SERPL-MCNC: 137 MG/DL (ref 65–140)
GLUCOSE SERPL-MCNC: 138 MG/DL (ref 65–140)
GLUCOSE SERPL-MCNC: 140 MG/DL (ref 65–140)
GLUCOSE SERPL-MCNC: 141 MG/DL (ref 65–140)
GLUCOSE SERPL-MCNC: 141 MG/DL (ref 65–140)
GLUCOSE SERPL-MCNC: 143 MG/DL (ref 65–140)
GLUCOSE SERPL-MCNC: 144 MG/DL (ref 65–140)
GLUCOSE SERPL-MCNC: 145 MG/DL (ref 65–140)
GLUCOSE SERPL-MCNC: 146 MG/DL (ref 65–140)
GLUCOSE SERPL-MCNC: 148 MG/DL (ref 65–140)
GLUCOSE SERPL-MCNC: 150 MG/DL (ref 65–140)
GLUCOSE SERPL-MCNC: 151 MG/DL (ref 65–140)
GLUCOSE SERPL-MCNC: 153 MG/DL (ref 65–140)
GLUCOSE SERPL-MCNC: 175 MG/DL (ref 65–140)
GLUCOSE SERPL-MCNC: 69 MG/DL (ref 65–140)
GLUCOSE SERPL-MCNC: 70 MG/DL (ref 65–140)
GLUCOSE SERPL-MCNC: 73 MG/DL (ref 65–140)
GLUCOSE SERPL-MCNC: 81 MG/DL (ref 65–140)
GLUCOSE SERPL-MCNC: 83 MG/DL (ref 65–140)
GLUCOSE SERPL-MCNC: 84 MG/DL (ref 65–140)
GLUCOSE SERPL-MCNC: 85 MG/DL (ref 65–140)
GLUCOSE SERPL-MCNC: 86 MG/DL (ref 65–140)
GLUCOSE SERPL-MCNC: 86 MG/DL (ref 65–140)
GLUCOSE SERPL-MCNC: 87 MG/DL (ref 65–140)
GLUCOSE SERPL-MCNC: 87 MG/DL (ref 65–140)
GLUCOSE SERPL-MCNC: 88 MG/DL (ref 65–140)
GLUCOSE SERPL-MCNC: 89 MG/DL (ref 65–140)
GLUCOSE SERPL-MCNC: 90 MG/DL (ref 65–140)
GLUCOSE SERPL-MCNC: 91 MG/DL (ref 65–140)
GLUCOSE SERPL-MCNC: 92 MG/DL (ref 65–140)
GLUCOSE SERPL-MCNC: 93 MG/DL (ref 65–140)
GLUCOSE SERPL-MCNC: 94 MG/DL (ref 65–140)
GLUCOSE SERPL-MCNC: 95 MG/DL (ref 65–140)
GLUCOSE SERPL-MCNC: 96 MG/DL (ref 65–140)
GLUCOSE SERPL-MCNC: 97 MG/DL (ref 65–140)
GLUCOSE SERPL-MCNC: 98 MG/DL (ref 65–140)
GLUCOSE SERPL-MCNC: 99 MG/DL (ref 65–140)
GLUCOSE UR STRIP-MCNC: NEGATIVE MG/DL
GRAM STN SPEC: ABNORMAL
HAV IGM SER QL: NORMAL
HBV CORE IGM SER QL: NORMAL
HBV SURFACE AG SER QL: NORMAL
HBV SURFACE AG SER QL: NORMAL
HCT VFR BLD AUTO: 19.1 % (ref 34.8–46.1)
HCT VFR BLD AUTO: 20 % (ref 34.8–46.1)
HCT VFR BLD AUTO: 20.9 % (ref 34.8–46.1)
HCT VFR BLD AUTO: 22.1 % (ref 34.8–46.1)
HCT VFR BLD AUTO: 22.8 % (ref 34.8–46.1)
HCT VFR BLD AUTO: 22.9 % (ref 34.8–46.1)
HCT VFR BLD AUTO: 23.1 % (ref 34.8–46.1)
HCT VFR BLD AUTO: 23.1 % (ref 34.8–46.1)
HCT VFR BLD AUTO: 23.2 % (ref 34.8–46.1)
HCT VFR BLD AUTO: 23.3 % (ref 34.8–46.1)
HCT VFR BLD AUTO: 23.4 % (ref 34.8–46.1)
HCT VFR BLD AUTO: 23.6 % (ref 34.8–46.1)
HCT VFR BLD AUTO: 23.8 % (ref 34.8–46.1)
HCT VFR BLD AUTO: 23.9 % (ref 34.8–46.1)
HCT VFR BLD AUTO: 24 % (ref 34.8–46.1)
HCT VFR BLD AUTO: 24 % (ref 34.8–46.1)
HCT VFR BLD AUTO: 24.2 % (ref 34.8–46.1)
HCT VFR BLD AUTO: 24.4 % (ref 34.8–46.1)
HCT VFR BLD AUTO: 24.6 % (ref 34.8–46.1)
HCT VFR BLD AUTO: 24.7 % (ref 34.8–46.1)
HCT VFR BLD AUTO: 24.7 % (ref 34.8–46.1)
HCT VFR BLD AUTO: 24.8 % (ref 34.8–46.1)
HCT VFR BLD AUTO: 25.1 % (ref 34.8–46.1)
HCT VFR BLD AUTO: 25.2 % (ref 34.8–46.1)
HCT VFR BLD AUTO: 25.2 % (ref 34.8–46.1)
HCT VFR BLD AUTO: 25.3 % (ref 34.8–46.1)
HCT VFR BLD AUTO: 25.4 % (ref 34.8–46.1)
HCT VFR BLD AUTO: 25.4 % (ref 34.8–46.1)
HCT VFR BLD AUTO: 25.5 % (ref 34.8–46.1)
HCT VFR BLD AUTO: 25.6 % (ref 34.8–46.1)
HCT VFR BLD AUTO: 26.2 % (ref 34.8–46.1)
HCT VFR BLD AUTO: 26.3 % (ref 34.8–46.1)
HCT VFR BLD AUTO: 26.4 % (ref 34.8–46.1)
HCT VFR BLD AUTO: 26.6 % (ref 34.8–46.1)
HCT VFR BLD AUTO: 26.8 % (ref 34.8–46.1)
HCT VFR BLD AUTO: 27.3 % (ref 34.8–46.1)
HCT VFR BLD AUTO: 27.3 % (ref 34.8–46.1)
HCT VFR BLD AUTO: 27.6 % (ref 34.8–46.1)
HCT VFR BLD AUTO: 27.9 % (ref 34.8–46.1)
HCT VFR BLD AUTO: 28 % (ref 34.8–46.1)
HCT VFR BLD AUTO: 29.6 % (ref 34.8–46.1)
HCV AB SER QL: NORMAL
HCV AB SER QL: NORMAL
HDLC SERPL-MCNC: 23 MG/DL
HGB BLD-MCNC: 6.1 G/DL (ref 11.5–15.4)
HGB BLD-MCNC: 6.2 G/DL (ref 11.5–15.4)
HGB BLD-MCNC: 6.4 G/DL (ref 11.5–15.4)
HGB BLD-MCNC: 6.8 G/DL (ref 11.5–15.4)
HGB BLD-MCNC: 7 G/DL (ref 11.5–15.4)
HGB BLD-MCNC: 7 G/DL (ref 11.5–15.4)
HGB BLD-MCNC: 7.1 G/DL (ref 11.5–15.4)
HGB BLD-MCNC: 7.1 G/DL (ref 11.5–15.4)
HGB BLD-MCNC: 7.2 G/DL (ref 11.5–15.4)
HGB BLD-MCNC: 7.3 G/DL (ref 11.5–15.4)
HGB BLD-MCNC: 7.4 G/DL (ref 11.5–15.4)
HGB BLD-MCNC: 7.5 G/DL (ref 11.5–15.4)
HGB BLD-MCNC: 7.5 G/DL (ref 11.5–15.4)
HGB BLD-MCNC: 7.6 G/DL (ref 11.5–15.4)
HGB BLD-MCNC: 7.7 G/DL (ref 11.5–15.4)
HGB BLD-MCNC: 7.8 G/DL (ref 11.5–15.4)
HGB BLD-MCNC: 7.9 G/DL (ref 11.5–15.4)
HGB BLD-MCNC: 7.9 G/DL (ref 11.5–15.4)
HGB BLD-MCNC: 8 G/DL (ref 11.5–15.4)
HGB BLD-MCNC: 8.1 G/DL (ref 11.5–15.4)
HGB BLD-MCNC: 8.1 G/DL (ref 11.5–15.4)
HGB BLD-MCNC: 8.2 G/DL (ref 11.5–15.4)
HGB BLD-MCNC: 8.2 G/DL (ref 11.5–15.4)
HGB BLD-MCNC: 8.3 G/DL (ref 11.5–15.4)
HGB BLD-MCNC: 8.4 G/DL (ref 11.5–15.4)
HGB BLD-MCNC: 8.6 G/DL (ref 11.5–15.4)
HGB BLD-MCNC: 8.6 G/DL (ref 11.5–15.4)
HGB BLD-MCNC: 8.7 G/DL (ref 11.5–15.4)
HGB BLD-MCNC: 8.8 G/DL (ref 11.5–15.4)
HGB BLD-MCNC: 9.3 G/DL (ref 11.5–15.4)
HGB UR QL STRIP.AUTO: ABNORMAL
HGB UR QL STRIP.AUTO: ABNORMAL
HGB UR QL STRIP.AUTO: NEGATIVE
HIV 1+2 AB+HIV1 P24 AG SERPL QL IA: NORMAL
HIV1 P24 AG SER QL: NORMAL
HYALINE CASTS #/AREA URNS LPF: ABNORMAL /LPF
IMM GRANULOCYTES # BLD AUTO: 0.05 THOUSAND/UL (ref 0–0.2)
IMM GRANULOCYTES # BLD AUTO: 0.06 THOUSAND/UL (ref 0–0.2)
IMM GRANULOCYTES # BLD AUTO: 0.07 THOUSAND/UL (ref 0–0.2)
IMM GRANULOCYTES # BLD AUTO: 0.08 THOUSAND/UL (ref 0–0.2)
IMM GRANULOCYTES # BLD AUTO: 0.09 THOUSAND/UL (ref 0–0.2)
IMM GRANULOCYTES # BLD AUTO: 0.09 THOUSAND/UL (ref 0–0.2)
IMM GRANULOCYTES # BLD AUTO: 0.1 THOUSAND/UL (ref 0–0.2)
IMM GRANULOCYTES # BLD AUTO: 0.11 THOUSAND/UL (ref 0–0.2)
IMM GRANULOCYTES # BLD AUTO: 0.12 THOUSAND/UL (ref 0–0.2)
IMM GRANULOCYTES # BLD AUTO: 0.16 THOUSAND/UL (ref 0–0.2)
IMM GRANULOCYTES # BLD AUTO: 0.18 THOUSAND/UL (ref 0–0.2)
IMM GRANULOCYTES # BLD AUTO: 0.21 THOUSAND/UL (ref 0–0.2)
IMM GRANULOCYTES # BLD AUTO: 0.23 THOUSAND/UL (ref 0–0.2)
IMM GRANULOCYTES # BLD AUTO: 0.26 THOUSAND/UL (ref 0–0.2)
IMM GRANULOCYTES # BLD AUTO: 0.3 THOUSAND/UL (ref 0–0.2)
IMM GRANULOCYTES # BLD AUTO: 0.32 THOUSAND/UL (ref 0–0.2)
IMM GRANULOCYTES # BLD AUTO: >0.5 THOUSAND/UL (ref 0–0.2)
IMM GRANULOCYTES NFR BLD AUTO: 1 % (ref 0–2)
IMM GRANULOCYTES NFR BLD AUTO: 2 % (ref 0–2)
INR PPP: 1.21 (ref 0.84–1.19)
IRON SATN MFR SERPL: 17 % (ref 15–50)
IRON SERPL-MCNC: 45 UG/DL (ref 50–170)
KETONES UR STRIP-MCNC: NEGATIVE MG/DL
LACTATE SERPL-SCNC: 0.9 MMOL/L (ref 0.5–2)
LACTATE SERPL-SCNC: 1 MMOL/L (ref 0.5–2)
LACTATE SERPL-SCNC: 1.4 MMOL/L (ref 0.5–2)
LDLC SERPL CALC-MCNC: 48 MG/DL (ref 0–100)
LEUKOCYTE ESTERASE UR QL STRIP: ABNORMAL
LEUKOCYTE ESTERASE UR QL STRIP: ABNORMAL
LEUKOCYTE ESTERASE UR QL STRIP: NEGATIVE
LIPASE SERPL-CCNC: 140 U/L (ref 73–393)
LYMPHOCYTES # BLD AUTO: 1.11 THOUSAND/UL (ref 0.6–4.47)
LYMPHOCYTES # BLD AUTO: 1.4 THOUSANDS/ÂΜL (ref 0.6–4.47)
LYMPHOCYTES # BLD AUTO: 1.44 THOUSANDS/ÂΜL (ref 0.6–4.47)
LYMPHOCYTES # BLD AUTO: 1.65 THOUSANDS/ÂΜL (ref 0.6–4.47)
LYMPHOCYTES # BLD AUTO: 1.68 THOUSANDS/ÂΜL (ref 0.6–4.47)
LYMPHOCYTES # BLD AUTO: 1.73 THOUSANDS/ÂΜL (ref 0.6–4.47)
LYMPHOCYTES # BLD AUTO: 1.76 THOUSANDS/ÂΜL (ref 0.6–4.47)
LYMPHOCYTES # BLD AUTO: 1.82 THOUSANDS/ÂΜL (ref 0.6–4.47)
LYMPHOCYTES # BLD AUTO: 1.83 THOUSANDS/ÂΜL (ref 0.6–4.47)
LYMPHOCYTES # BLD AUTO: 1.86 THOUSANDS/ÂΜL (ref 0.6–4.47)
LYMPHOCYTES # BLD AUTO: 1.88 THOUSANDS/ÂΜL (ref 0.6–4.47)
LYMPHOCYTES # BLD AUTO: 1.88 THOUSANDS/ÂΜL (ref 0.6–4.47)
LYMPHOCYTES # BLD AUTO: 1.89 THOUSANDS/ÂΜL (ref 0.6–4.47)
LYMPHOCYTES # BLD AUTO: 1.94 THOUSANDS/ÂΜL (ref 0.6–4.47)
LYMPHOCYTES # BLD AUTO: 1.96 THOUSANDS/ÂΜL (ref 0.6–4.47)
LYMPHOCYTES # BLD AUTO: 1.96 THOUSANDS/ÂΜL (ref 0.6–4.47)
LYMPHOCYTES # BLD AUTO: 1.99 THOUSANDS/ÂΜL (ref 0.6–4.47)
LYMPHOCYTES # BLD AUTO: 2.03 THOUSANDS/ÂΜL (ref 0.6–4.47)
LYMPHOCYTES # BLD AUTO: 2.04 THOUSANDS/ÂΜL (ref 0.6–4.47)
LYMPHOCYTES # BLD AUTO: 2.14 THOUSANDS/ÂΜL (ref 0.6–4.47)
LYMPHOCYTES # BLD AUTO: 2.22 THOUSANDS/ÂΜL (ref 0.6–4.47)
LYMPHOCYTES # BLD AUTO: 2.23 THOUSANDS/ÂΜL (ref 0.6–4.47)
LYMPHOCYTES # BLD AUTO: 2.41 THOUSANDS/ÂΜL (ref 0.6–4.47)
LYMPHOCYTES # BLD AUTO: 2.67 THOUSANDS/ÂΜL (ref 0.6–4.47)
LYMPHOCYTES # BLD AUTO: 2.87 THOUSANDS/ÂΜL (ref 0.6–4.47)
LYMPHOCYTES # BLD AUTO: 3.38 THOUSANDS/ÂΜL (ref 0.6–4.47)
LYMPHOCYTES # BLD AUTO: 8 % (ref 14–44)
LYMPHOCYTES NFR BLD AUTO: 10 % (ref 14–44)
LYMPHOCYTES NFR BLD AUTO: 11 % (ref 14–44)
LYMPHOCYTES NFR BLD AUTO: 11 % (ref 14–44)
LYMPHOCYTES NFR BLD AUTO: 12 % (ref 14–44)
LYMPHOCYTES NFR BLD AUTO: 13 % (ref 14–44)
LYMPHOCYTES NFR BLD AUTO: 14 % (ref 14–44)
LYMPHOCYTES NFR BLD AUTO: 15 % (ref 14–44)
LYMPHOCYTES NFR BLD AUTO: 15 % (ref 14–44)
LYMPHOCYTES NFR BLD AUTO: 16 % (ref 14–44)
LYMPHOCYTES NFR BLD AUTO: 16 % (ref 14–44)
LYMPHOCYTES NFR BLD AUTO: 17 % (ref 14–44)
LYMPHOCYTES NFR BLD AUTO: 17 % (ref 14–44)
LYMPHOCYTES NFR BLD AUTO: 19 % (ref 14–44)
LYMPHOCYTES NFR BLD AUTO: 20 % (ref 14–44)
LYMPHOCYTES NFR BLD AUTO: 20 % (ref 14–44)
LYMPHOCYTES NFR BLD AUTO: 21 % (ref 14–44)
LYMPHOCYTES NFR BLD AUTO: 21 % (ref 14–44)
LYMPHOCYTES NFR BLD AUTO: 22 % (ref 14–44)
LYMPHOCYTES NFR BLD AUTO: 23 % (ref 14–44)
LYMPHOCYTES NFR BLD AUTO: 23 % (ref 14–44)
LYMPHOCYTES NFR BLD AUTO: 25 % (ref 14–44)
MACROCYTES BLD QL AUTO: PRESENT
MAGNESIUM SERPL-MCNC: 2.3 MG/DL (ref 1.6–2.6)
MAGNESIUM SERPL-MCNC: 2.4 MG/DL (ref 1.6–2.6)
MAGNESIUM SERPL-MCNC: 2.4 MG/DL (ref 1.6–2.6)
MCH RBC QN AUTO: 29.6 PG (ref 26.8–34.3)
MCH RBC QN AUTO: 30.1 PG (ref 26.8–34.3)
MCH RBC QN AUTO: 30.1 PG (ref 26.8–34.3)
MCH RBC QN AUTO: 30.3 PG (ref 26.8–34.3)
MCH RBC QN AUTO: 30.3 PG (ref 26.8–34.3)
MCH RBC QN AUTO: 30.4 PG (ref 26.8–34.3)
MCH RBC QN AUTO: 30.5 PG (ref 26.8–34.3)
MCH RBC QN AUTO: 30.6 PG (ref 26.8–34.3)
MCH RBC QN AUTO: 30.7 PG (ref 26.8–34.3)
MCH RBC QN AUTO: 30.8 PG (ref 26.8–34.3)
MCH RBC QN AUTO: 30.9 PG (ref 26.8–34.3)
MCH RBC QN AUTO: 31 PG (ref 26.8–34.3)
MCH RBC QN AUTO: 31 PG (ref 26.8–34.3)
MCH RBC QN AUTO: 31.1 PG (ref 26.8–34.3)
MCH RBC QN AUTO: 31.2 PG (ref 26.8–34.3)
MCH RBC QN AUTO: 31.2 PG (ref 26.8–34.3)
MCH RBC QN AUTO: 31.3 PG (ref 26.8–34.3)
MCH RBC QN AUTO: 31.3 PG (ref 26.8–34.3)
MCH RBC QN AUTO: 31.4 PG (ref 26.8–34.3)
MCH RBC QN AUTO: 31.5 PG (ref 26.8–34.3)
MCH RBC QN AUTO: 31.6 PG (ref 26.8–34.3)
MCH RBC QN AUTO: 31.6 PG (ref 26.8–34.3)
MCH RBC QN AUTO: 31.8 PG (ref 26.8–34.3)
MCH RBC QN AUTO: 32.2 PG (ref 26.8–34.3)
MCH RBC QN AUTO: 32.2 PG (ref 26.8–34.3)
MCHC RBC AUTO-ENTMCNC: 29.9 G/DL (ref 31.4–37.4)
MCHC RBC AUTO-ENTMCNC: 30 G/DL (ref 31.4–37.4)
MCHC RBC AUTO-ENTMCNC: 30 G/DL (ref 31.4–37.4)
MCHC RBC AUTO-ENTMCNC: 30.1 G/DL (ref 31.4–37.4)
MCHC RBC AUTO-ENTMCNC: 30.1 G/DL (ref 31.4–37.4)
MCHC RBC AUTO-ENTMCNC: 30.2 G/DL (ref 31.4–37.4)
MCHC RBC AUTO-ENTMCNC: 30.3 G/DL (ref 31.4–37.4)
MCHC RBC AUTO-ENTMCNC: 30.5 G/DL (ref 31.4–37.4)
MCHC RBC AUTO-ENTMCNC: 30.6 G/DL (ref 31.4–37.4)
MCHC RBC AUTO-ENTMCNC: 30.7 G/DL (ref 31.4–37.4)
MCHC RBC AUTO-ENTMCNC: 30.8 G/DL (ref 31.4–37.4)
MCHC RBC AUTO-ENTMCNC: 30.9 G/DL (ref 31.4–37.4)
MCHC RBC AUTO-ENTMCNC: 31 G/DL (ref 31.4–37.4)
MCHC RBC AUTO-ENTMCNC: 31.1 G/DL (ref 31.4–37.4)
MCHC RBC AUTO-ENTMCNC: 31.2 G/DL (ref 31.4–37.4)
MCHC RBC AUTO-ENTMCNC: 31.3 G/DL (ref 31.4–37.4)
MCHC RBC AUTO-ENTMCNC: 31.3 G/DL (ref 31.4–37.4)
MCHC RBC AUTO-ENTMCNC: 31.4 G/DL (ref 31.4–37.4)
MCHC RBC AUTO-ENTMCNC: 31.4 G/DL (ref 31.4–37.4)
MCHC RBC AUTO-ENTMCNC: 31.5 G/DL (ref 31.4–37.4)
MCHC RBC AUTO-ENTMCNC: 31.6 G/DL (ref 31.4–37.4)
MCHC RBC AUTO-ENTMCNC: 31.8 G/DL (ref 31.4–37.4)
MCHC RBC AUTO-ENTMCNC: 31.9 G/DL (ref 31.4–37.4)
MCV RBC AUTO: 100 FL (ref 82–98)
MCV RBC AUTO: 101 FL (ref 82–98)
MCV RBC AUTO: 102 FL (ref 82–98)
MCV RBC AUTO: 103 FL (ref 82–98)
MCV RBC AUTO: 103 FL (ref 82–98)
MCV RBC AUTO: 104 FL (ref 82–98)
MCV RBC AUTO: 105 FL (ref 82–98)
MCV RBC AUTO: 96 FL (ref 82–98)
MCV RBC AUTO: 97 FL (ref 82–98)
MCV RBC AUTO: 98 FL (ref 82–98)
MCV RBC AUTO: 99 FL (ref 82–98)
MECA+MECC ISLT/SPM QL: DETECTED
METAMYELOCYTES NFR BLD MANUAL: 2 % (ref 0–1)
MONOCYTES # BLD AUTO: 0.56 THOUSAND/UL (ref 0–1.22)
MONOCYTES # BLD AUTO: 0.76 THOUSAND/ÂΜL (ref 0.17–1.22)
MONOCYTES # BLD AUTO: 0.78 THOUSAND/ÂΜL (ref 0.17–1.22)
MONOCYTES # BLD AUTO: 0.81 THOUSAND/ÂΜL (ref 0.17–1.22)
MONOCYTES # BLD AUTO: 0.82 THOUSAND/ÂΜL (ref 0.17–1.22)
MONOCYTES # BLD AUTO: 0.82 THOUSAND/ÂΜL (ref 0.17–1.22)
MONOCYTES # BLD AUTO: 0.83 THOUSAND/ÂΜL (ref 0.17–1.22)
MONOCYTES # BLD AUTO: 0.85 THOUSAND/ÂΜL (ref 0.17–1.22)
MONOCYTES # BLD AUTO: 0.86 THOUSAND/ÂΜL (ref 0.17–1.22)
MONOCYTES # BLD AUTO: 0.87 THOUSAND/ÂΜL (ref 0.17–1.22)
MONOCYTES # BLD AUTO: 0.87 THOUSAND/ÂΜL (ref 0.17–1.22)
MONOCYTES # BLD AUTO: 0.88 THOUSAND/ÂΜL (ref 0.17–1.22)
MONOCYTES # BLD AUTO: 0.89 THOUSAND/ÂΜL (ref 0.17–1.22)
MONOCYTES # BLD AUTO: 0.9 THOUSAND/ÂΜL (ref 0.17–1.22)
MONOCYTES # BLD AUTO: 0.91 THOUSAND/ÂΜL (ref 0.17–1.22)
MONOCYTES # BLD AUTO: 0.92 THOUSAND/ÂΜL (ref 0.17–1.22)
MONOCYTES # BLD AUTO: 0.93 THOUSAND/ÂΜL (ref 0.17–1.22)
MONOCYTES # BLD AUTO: 0.94 THOUSAND/ÂΜL (ref 0.17–1.22)
MONOCYTES # BLD AUTO: 0.94 THOUSAND/ÂΜL (ref 0.17–1.22)
MONOCYTES # BLD AUTO: 0.97 THOUSAND/ÂΜL (ref 0.17–1.22)
MONOCYTES # BLD AUTO: 0.98 THOUSAND/ÂΜL (ref 0.17–1.22)
MONOCYTES # BLD AUTO: 0.99 THOUSAND/ÂΜL (ref 0.17–1.22)
MONOCYTES # BLD AUTO: 1.15 THOUSAND/ÂΜL (ref 0.17–1.22)
MONOCYTES # BLD AUTO: 1.36 THOUSAND/ÂΜL (ref 0.17–1.22)
MONOCYTES NFR BLD AUTO: 10 % (ref 4–12)
MONOCYTES NFR BLD AUTO: 11 % (ref 4–12)
MONOCYTES NFR BLD AUTO: 4 % (ref 4–12)
MONOCYTES NFR BLD AUTO: 5 % (ref 4–12)
MONOCYTES NFR BLD AUTO: 6 % (ref 4–12)
MONOCYTES NFR BLD AUTO: 6 % (ref 4–12)
MONOCYTES NFR BLD AUTO: 7 % (ref 4–12)
MONOCYTES NFR BLD AUTO: 8 % (ref 4–12)
MONOCYTES NFR BLD AUTO: 9 % (ref 4–12)
MONOCYTES NFR BLD AUTO: 9 % (ref 4–12)
MONOCYTES NFR BLD: 4 % (ref 4–12)
MUCOUS THREADS UR QL AUTO: ABNORMAL
MV PEAK A VEL: 0.81 M/S
MV PEAK E VEL: 85 CM/S
MV STENOSIS PRESSURE HALF TIME: 66 MS
MV VALVE AREA P 1/2 METHOD: 3.33 CM2
NEUTROPHILS # BLD AUTO: 10.09 THOUSANDS/ÂΜL (ref 1.85–7.62)
NEUTROPHILS # BLD AUTO: 10.14 THOUSANDS/ÂΜL (ref 1.85–7.62)
NEUTROPHILS # BLD AUTO: 10.6 THOUSANDS/ÂΜL (ref 1.85–7.62)
NEUTROPHILS # BLD AUTO: 13.85 THOUSANDS/ÂΜL (ref 1.85–7.62)
NEUTROPHILS # BLD AUTO: 18.96 THOUSANDS/ÂΜL (ref 1.85–7.62)
NEUTROPHILS # BLD AUTO: 24.81 THOUSANDS/ÂΜL (ref 1.85–7.62)
NEUTROPHILS # BLD AUTO: 4.45 THOUSANDS/ÂΜL (ref 1.85–7.62)
NEUTROPHILS # BLD AUTO: 4.72 THOUSANDS/ÂΜL (ref 1.85–7.62)
NEUTROPHILS # BLD AUTO: 4.99 THOUSANDS/ÂΜL (ref 1.85–7.62)
NEUTROPHILS # BLD AUTO: 5.06 THOUSANDS/ÂΜL (ref 1.85–7.62)
NEUTROPHILS # BLD AUTO: 5.12 THOUSANDS/ÂΜL (ref 1.85–7.62)
NEUTROPHILS # BLD AUTO: 5.66 THOUSANDS/ÂΜL (ref 1.85–7.62)
NEUTROPHILS # BLD AUTO: 5.98 THOUSANDS/ÂΜL (ref 1.85–7.62)
NEUTROPHILS # BLD AUTO: 6.39 THOUSANDS/ÂΜL (ref 1.85–7.62)
NEUTROPHILS # BLD AUTO: 6.39 THOUSANDS/ÂΜL (ref 1.85–7.62)
NEUTROPHILS # BLD AUTO: 6.86 THOUSANDS/ÂΜL (ref 1.85–7.62)
NEUTROPHILS # BLD AUTO: 6.99 THOUSANDS/ÂΜL (ref 1.85–7.62)
NEUTROPHILS # BLD AUTO: 7.34 THOUSANDS/ÂΜL (ref 1.85–7.62)
NEUTROPHILS # BLD AUTO: 7.41 THOUSANDS/ÂΜL (ref 1.85–7.62)
NEUTROPHILS # BLD AUTO: 8.54 THOUSANDS/ÂΜL (ref 1.85–7.62)
NEUTROPHILS # BLD AUTO: 9.1 THOUSANDS/ÂΜL (ref 1.85–7.62)
NEUTROPHILS # BLD AUTO: 9.15 THOUSANDS/ÂΜL (ref 1.85–7.62)
NEUTROPHILS # BLD AUTO: 9.18 THOUSANDS/ÂΜL (ref 1.85–7.62)
NEUTROPHILS # BLD AUTO: 9.29 THOUSANDS/ÂΜL (ref 1.85–7.62)
NEUTROPHILS # BLD AUTO: 9.37 THOUSANDS/ÂΜL (ref 1.85–7.62)
NEUTROPHILS # BLD MANUAL: 10.99 THOUSAND/UL (ref 1.85–7.62)
NEUTS BAND NFR BLD MANUAL: 11 % (ref 0–8)
NEUTS SEG NFR BLD AUTO: 53 % (ref 43–75)
NEUTS SEG NFR BLD AUTO: 53 % (ref 43–75)
NEUTS SEG NFR BLD AUTO: 55 % (ref 43–75)
NEUTS SEG NFR BLD AUTO: 56 % (ref 43–75)
NEUTS SEG NFR BLD AUTO: 56 % (ref 43–75)
NEUTS SEG NFR BLD AUTO: 58 % (ref 43–75)
NEUTS SEG NFR BLD AUTO: 59 % (ref 43–75)
NEUTS SEG NFR BLD AUTO: 59 % (ref 43–75)
NEUTS SEG NFR BLD AUTO: 60 % (ref 43–75)
NEUTS SEG NFR BLD AUTO: 61 % (ref 43–75)
NEUTS SEG NFR BLD AUTO: 62 % (ref 43–75)
NEUTS SEG NFR BLD AUTO: 63 % (ref 43–75)
NEUTS SEG NFR BLD AUTO: 64 % (ref 43–75)
NEUTS SEG NFR BLD AUTO: 66 % (ref 43–75)
NEUTS SEG NFR BLD AUTO: 66 % (ref 43–75)
NEUTS SEG NFR BLD AUTO: 67 % (ref 43–75)
NEUTS SEG NFR BLD AUTO: 68 % (ref 43–75)
NEUTS SEG NFR BLD AUTO: 70 % (ref 43–75)
NEUTS SEG NFR BLD AUTO: 71 % (ref 43–75)
NEUTS SEG NFR BLD AUTO: 71 % (ref 43–75)
NEUTS SEG NFR BLD AUTO: 73 % (ref 43–75)
NEUTS SEG NFR BLD AUTO: 77 % (ref 43–75)
NEUTS SEG NFR BLD AUTO: 79 % (ref 43–75)
NEUTS SEG NFR BLD AUTO: 79 % (ref 43–75)
NITRITE UR QL STRIP: NEGATIVE
NITRITE UR QL STRIP: POSITIVE
NITRITE UR QL STRIP: POSITIVE
NON-SQ EPI CELLS URNS QL MICRO: ABNORMAL /HPF
NONHDLC SERPL-MCNC: 60 MG/DL
NRBC BLD AUTO-RTO: 0 /100 WBCS
OSMOLALITY UR/SERPL-RTO: 283 MMOL/KG (ref 282–298)
OSMOLALITY UR: 415 MMOL/KG
P AXIS: 55 DEGREES
PH UR STRIP.AUTO: 6.5 [PH]
PH UR STRIP.AUTO: 8 [PH]
PH UR STRIP.AUTO: 8 [PH]
PHOSPHATE SERPL-MCNC: 3.9 MG/DL (ref 2.3–4.1)
PHOSPHATE SERPL-MCNC: 4.4 MG/DL (ref 2.3–4.1)
PHOSPHATE SERPL-MCNC: 4.4 MG/DL (ref 2.3–4.1)
PLATELET # BLD AUTO: 372 THOUSANDS/UL (ref 149–390)
PLATELET # BLD AUTO: 419 THOUSANDS/UL (ref 149–390)
PLATELET # BLD AUTO: 438 THOUSANDS/UL (ref 149–390)
PLATELET # BLD AUTO: 441 THOUSANDS/UL (ref 149–390)
PLATELET # BLD AUTO: 441 THOUSANDS/UL (ref 149–390)
PLATELET # BLD AUTO: 443 THOUSANDS/UL (ref 149–390)
PLATELET # BLD AUTO: 445 THOUSANDS/UL (ref 149–390)
PLATELET # BLD AUTO: 445 THOUSANDS/UL (ref 149–390)
PLATELET # BLD AUTO: 446 THOUSANDS/UL (ref 149–390)
PLATELET # BLD AUTO: 447 THOUSANDS/UL (ref 149–390)
PLATELET # BLD AUTO: 448 THOUSANDS/UL (ref 149–390)
PLATELET # BLD AUTO: 449 THOUSANDS/UL (ref 149–390)
PLATELET # BLD AUTO: 454 THOUSANDS/UL (ref 149–390)
PLATELET # BLD AUTO: 461 THOUSANDS/UL (ref 149–390)
PLATELET # BLD AUTO: 468 THOUSANDS/UL (ref 149–390)
PLATELET # BLD AUTO: 468 THOUSANDS/UL (ref 149–390)
PLATELET # BLD AUTO: 481 THOUSANDS/UL (ref 149–390)
PLATELET # BLD AUTO: 482 THOUSANDS/UL (ref 149–390)
PLATELET # BLD AUTO: 483 THOUSANDS/UL (ref 149–390)
PLATELET # BLD AUTO: 488 THOUSANDS/UL (ref 149–390)
PLATELET # BLD AUTO: 492 THOUSANDS/UL (ref 149–390)
PLATELET # BLD AUTO: 495 THOUSANDS/UL (ref 149–390)
PLATELET # BLD AUTO: 496 THOUSANDS/UL (ref 149–390)
PLATELET # BLD AUTO: 501 THOUSANDS/UL (ref 149–390)
PLATELET # BLD AUTO: 502 THOUSANDS/UL (ref 149–390)
PLATELET # BLD AUTO: 504 THOUSANDS/UL (ref 149–390)
PLATELET # BLD AUTO: 508 THOUSANDS/UL (ref 149–390)
PLATELET # BLD AUTO: 511 THOUSANDS/UL (ref 149–390)
PLATELET # BLD AUTO: 516 THOUSANDS/UL (ref 149–390)
PLATELET # BLD AUTO: 524 THOUSANDS/UL (ref 149–390)
PLATELET # BLD AUTO: 532 THOUSANDS/UL (ref 149–390)
PLATELET # BLD AUTO: 534 THOUSANDS/UL (ref 149–390)
PLATELET # BLD AUTO: 539 THOUSANDS/UL (ref 149–390)
PLATELET # BLD AUTO: 552 THOUSANDS/UL (ref 149–390)
PLATELET # BLD AUTO: 558 THOUSANDS/UL (ref 149–390)
PLATELET # BLD AUTO: 572 THOUSANDS/UL (ref 149–390)
PLATELET # BLD AUTO: 574 THOUSANDS/UL (ref 149–390)
PLATELET # BLD AUTO: 576 THOUSANDS/UL (ref 149–390)
PLATELET # BLD AUTO: 579 THOUSANDS/UL (ref 149–390)
PLATELET # BLD AUTO: 582 THOUSANDS/UL (ref 149–390)
PLATELET # BLD AUTO: 652 THOUSANDS/UL (ref 149–390)
PLATELET BLD QL SMEAR: ABNORMAL
PMV BLD AUTO: 10 FL (ref 8.9–12.7)
PMV BLD AUTO: 10.1 FL (ref 8.9–12.7)
PMV BLD AUTO: 10.7 FL (ref 8.9–12.7)
PMV BLD AUTO: 10.7 FL (ref 8.9–12.7)
PMV BLD AUTO: 9.3 FL (ref 8.9–12.7)
PMV BLD AUTO: 9.4 FL (ref 8.9–12.7)
PMV BLD AUTO: 9.5 FL (ref 8.9–12.7)
PMV BLD AUTO: 9.6 FL (ref 8.9–12.7)
PMV BLD AUTO: 9.7 FL (ref 8.9–12.7)
PMV BLD AUTO: 9.8 FL (ref 8.9–12.7)
PMV BLD AUTO: 9.9 FL (ref 8.9–12.7)
POLYCHROMASIA BLD QL SMEAR: PRESENT
POTASSIUM SERPL-SCNC: 3.4 MMOL/L (ref 3.5–5.3)
POTASSIUM SERPL-SCNC: 3.5 MMOL/L (ref 3.5–5.3)
POTASSIUM SERPL-SCNC: 3.6 MMOL/L (ref 3.5–5.3)
POTASSIUM SERPL-SCNC: 3.6 MMOL/L (ref 3.5–5.3)
POTASSIUM SERPL-SCNC: 3.7 MMOL/L (ref 3.5–5.3)
POTASSIUM SERPL-SCNC: 3.8 MMOL/L (ref 3.5–5.3)
POTASSIUM SERPL-SCNC: 3.9 MMOL/L (ref 3.5–5.3)
POTASSIUM SERPL-SCNC: 3.9 MMOL/L (ref 3.5–5.3)
POTASSIUM SERPL-SCNC: 4 MMOL/L (ref 3.5–5.3)
POTASSIUM SERPL-SCNC: 4.2 MMOL/L (ref 3.5–5.3)
POTASSIUM SERPL-SCNC: 4.3 MMOL/L (ref 3.5–5.3)
POTASSIUM SERPL-SCNC: 4.4 MMOL/L (ref 3.5–5.3)
POTASSIUM SERPL-SCNC: 4.5 MMOL/L (ref 3.5–5.3)
POTASSIUM SERPL-SCNC: 4.6 MMOL/L (ref 3.5–5.3)
POTASSIUM SERPL-SCNC: 4.7 MMOL/L (ref 3.5–5.3)
POTASSIUM SERPL-SCNC: 4.7 MMOL/L (ref 3.5–5.3)
POTASSIUM SERPL-SCNC: 4.8 MMOL/L (ref 3.5–5.3)
POTASSIUM SERPL-SCNC: 4.9 MMOL/L (ref 3.5–5.3)
POTASSIUM SERPL-SCNC: 4.9 MMOL/L (ref 3.5–5.3)
POTASSIUM SERPL-SCNC: 5 MMOL/L (ref 3.5–5.3)
POTASSIUM SERPL-SCNC: 5 MMOL/L (ref 3.5–5.3)
POTASSIUM SERPL-SCNC: 5.1 MMOL/L (ref 3.5–5.3)
POTASSIUM SERPL-SCNC: 5.1 MMOL/L (ref 3.5–5.3)
POTASSIUM SERPL-SCNC: 5.2 MMOL/L (ref 3.5–5.3)
POTASSIUM SERPL-SCNC: 5.3 MMOL/L (ref 3.5–5.3)
POTASSIUM SERPL-SCNC: 5.4 MMOL/L (ref 3.5–5.3)
POTASSIUM SERPL-SCNC: 5.5 MMOL/L (ref 3.5–5.3)
POTASSIUM SERPL-SCNC: 5.5 MMOL/L (ref 3.5–5.3)
POTASSIUM SERPL-SCNC: 5.6 MMOL/L (ref 3.5–5.3)
POTASSIUM SERPL-SCNC: 5.7 MMOL/L (ref 3.5–5.3)
POTASSIUM SERPL-SCNC: 5.9 MMOL/L (ref 3.5–5.3)
PR INTERVAL: 126 MS
PROCALCITONIN SERPL-MCNC: 0.27 NG/ML
PROCALCITONIN SERPL-MCNC: 0.27 NG/ML
PROCALCITONIN SERPL-MCNC: 0.4 NG/ML
PROCALCITONIN SERPL-MCNC: 0.54 NG/ML
PROT SERPL-MCNC: 6.3 G/DL (ref 6.4–8.4)
PROT SERPL-MCNC: 6.5 G/DL (ref 6.4–8.4)
PROT SERPL-MCNC: 6.7 G/DL (ref 6.4–8.4)
PROT SERPL-MCNC: 6.8 G/DL (ref 6.4–8.4)
PROT SERPL-MCNC: 6.9 G/DL (ref 6.4–8.4)
PROT SERPL-MCNC: 7 G/DL (ref 6.4–8.4)
PROT SERPL-MCNC: 7.1 G/DL (ref 6.4–8.4)
PROT SERPL-MCNC: 7.2 G/DL (ref 6.4–8.4)
PROT SERPL-MCNC: 7.2 G/DL (ref 6.4–8.4)
PROT SERPL-MCNC: 7.3 G/DL (ref 6.4–8.4)
PROT SERPL-MCNC: 7.3 G/DL (ref 6.4–8.4)
PROT SERPL-MCNC: 7.4 G/DL (ref 6.4–8.4)
PROT SERPL-MCNC: 7.5 G/DL (ref 6.4–8.4)
PROT SERPL-MCNC: 7.6 G/DL (ref 6.4–8.4)
PROT SERPL-MCNC: 7.7 G/DL (ref 6.4–8.4)
PROT SERPL-MCNC: 7.7 G/DL (ref 6.4–8.4)
PROT SERPL-MCNC: 7.8 G/DL (ref 6.4–8.4)
PROT SERPL-MCNC: 8.4 G/DL (ref 6.4–8.4)
PROT UR STRIP-MCNC: ABNORMAL MG/DL
PROTHROMBIN TIME: 15.6 SECONDS (ref 11.6–14.5)
PTH-INTACT SERPL-MCNC: <6.3 PG/ML (ref 18.4–80.1)
QRS AXIS: 30 DEGREES
QRSD INTERVAL: 80 MS
QT INTERVAL: 326 MS
QTC INTERVAL: 418 MS
RA PRESSURE ESTIMATED: 3 MMHG
RBC # BLD AUTO: 1.96 MILLION/UL (ref 3.81–5.12)
RBC # BLD AUTO: 2.08 MILLION/UL (ref 3.81–5.12)
RBC # BLD AUTO: 2.11 MILLION/UL (ref 3.81–5.12)
RBC # BLD AUTO: 2.27 MILLION/UL (ref 3.81–5.12)
RBC # BLD AUTO: 2.27 MILLION/UL (ref 3.81–5.12)
RBC # BLD AUTO: 2.29 MILLION/UL (ref 3.81–5.12)
RBC # BLD AUTO: 2.3 MILLION/UL (ref 3.81–5.12)
RBC # BLD AUTO: 2.34 MILLION/UL (ref 3.81–5.12)
RBC # BLD AUTO: 2.34 MILLION/UL (ref 3.81–5.12)
RBC # BLD AUTO: 2.35 MILLION/UL (ref 3.81–5.12)
RBC # BLD AUTO: 2.36 MILLION/UL (ref 3.81–5.12)
RBC # BLD AUTO: 2.36 MILLION/UL (ref 3.81–5.12)
RBC # BLD AUTO: 2.37 MILLION/UL (ref 3.81–5.12)
RBC # BLD AUTO: 2.38 MILLION/UL (ref 3.81–5.12)
RBC # BLD AUTO: 2.39 MILLION/UL (ref 3.81–5.12)
RBC # BLD AUTO: 2.41 MILLION/UL (ref 3.81–5.12)
RBC # BLD AUTO: 2.42 MILLION/UL (ref 3.81–5.12)
RBC # BLD AUTO: 2.44 MILLION/UL (ref 3.81–5.12)
RBC # BLD AUTO: 2.45 MILLION/UL (ref 3.81–5.12)
RBC # BLD AUTO: 2.46 MILLION/UL (ref 3.81–5.12)
RBC # BLD AUTO: 2.47 MILLION/UL (ref 3.81–5.12)
RBC # BLD AUTO: 2.47 MILLION/UL (ref 3.81–5.12)
RBC # BLD AUTO: 2.49 MILLION/UL (ref 3.81–5.12)
RBC # BLD AUTO: 2.5 MILLION/UL (ref 3.81–5.12)
RBC # BLD AUTO: 2.51 MILLION/UL (ref 3.81–5.12)
RBC # BLD AUTO: 2.54 MILLION/UL (ref 3.81–5.12)
RBC # BLD AUTO: 2.55 MILLION/UL (ref 3.81–5.12)
RBC # BLD AUTO: 2.55 MILLION/UL (ref 3.81–5.12)
RBC # BLD AUTO: 2.56 MILLION/UL (ref 3.81–5.12)
RBC # BLD AUTO: 2.57 MILLION/UL (ref 3.81–5.12)
RBC # BLD AUTO: 2.59 MILLION/UL (ref 3.81–5.12)
RBC # BLD AUTO: 2.6 MILLION/UL (ref 3.81–5.12)
RBC # BLD AUTO: 2.62 MILLION/UL (ref 3.81–5.12)
RBC # BLD AUTO: 2.69 MILLION/UL (ref 3.81–5.12)
RBC # BLD AUTO: 2.69 MILLION/UL (ref 3.81–5.12)
RBC # BLD AUTO: 2.7 MILLION/UL (ref 3.81–5.12)
RBC # BLD AUTO: 2.7 MILLION/UL (ref 3.81–5.12)
RBC # BLD AUTO: 2.71 MILLION/UL (ref 3.81–5.12)
RBC # BLD AUTO: 2.73 MILLION/UL (ref 3.81–5.12)
RBC # BLD AUTO: 2.77 MILLION/UL (ref 3.81–5.12)
RBC # BLD AUTO: 2.79 MILLION/UL (ref 3.81–5.12)
RBC # BLD AUTO: 2.85 MILLION/UL (ref 3.81–5.12)
RBC # BLD AUTO: 2.85 MILLION/UL (ref 3.81–5.12)
RBC # BLD AUTO: 3.04 MILLION/UL (ref 3.81–5.12)
RBC #/AREA URNS AUTO: ABNORMAL /HPF
RBC MORPH BLD: PRESENT
RH BLD: POSITIVE
RSV RNA RESP QL NAA+PROBE: NEGATIVE
RV PSP: 33 MMHG
S EPIDERMIDIS DNA BLD POS QL NAA+NON-PRB: DETECTED
SARS-COV-2 RNA RESP QL NAA+PROBE: NEGATIVE
SARS-COV-2 RNA RESP QL NAA+PROBE: NEGATIVE
SL CV LV EF: 65
SL CV LV EF: 75
SODIUM 24H UR-SCNC: 83 MOL/L
SODIUM 24H UR-SCNC: 99 MOL/L
SODIUM SERPL-SCNC: 128 MMOL/L (ref 135–147)
SODIUM SERPL-SCNC: 128 MMOL/L (ref 135–147)
SODIUM SERPL-SCNC: 129 MMOL/L (ref 135–147)
SODIUM SERPL-SCNC: 130 MMOL/L (ref 135–147)
SODIUM SERPL-SCNC: 131 MMOL/L (ref 135–147)
SODIUM SERPL-SCNC: 132 MMOL/L (ref 135–147)
SODIUM SERPL-SCNC: 133 MMOL/L (ref 135–147)
SODIUM SERPL-SCNC: 134 MMOL/L (ref 135–147)
SODIUM SERPL-SCNC: 135 MMOL/L (ref 135–147)
SODIUM SERPL-SCNC: 136 MMOL/L (ref 135–147)
SODIUM SERPL-SCNC: 136 MMOL/L (ref 135–147)
SODIUM SERPL-SCNC: 137 MMOL/L (ref 135–147)
SODIUM SERPL-SCNC: 137 MMOL/L (ref 135–147)
SODIUM SERPL-SCNC: 138 MMOL/L (ref 135–147)
SODIUM SERPL-SCNC: 139 MMOL/L (ref 135–147)
SP GR UR STRIP.AUTO: 1.01 (ref 1–1.03)
SPECIMEN EXPIRATION DATE: NORMAL
T WAVE AXIS: -2 DEGREES
T3REVERSE SERPL-MCNC: 21.1 NG/DL (ref 9.2–24.1)
T4 FREE SERPL-MCNC: 0.75 NG/DL (ref 0.76–1.46)
T4 FREE SERPL-MCNC: 0.81 NG/DL (ref 0.76–1.46)
T4 FREE SERPL-MCNC: 0.97 NG/DL (ref 0.76–1.46)
TIBC SERPL-MCNC: 272 UG/DL (ref 250–450)
TR MAX PG: 30 MMHG
TR PEAK VELOCITY: 2.8 M/S
TRICUSPID VALVE PEAK REGURGITATION VELOCITY: 2.75 M/S
TRIGL SERPL-MCNC: 60 MG/DL
TSH SERPL DL<=0.05 MIU/L-ACNC: 27.3 UIU/ML (ref 0.45–4.5)
TSH SERPL DL<=0.05 MIU/L-ACNC: 43.5 UIU/ML (ref 0.45–4.5)
TSH SERPL DL<=0.05 MIU/L-ACNC: 67.2 UIU/ML (ref 0.45–4.5)
UNIT DISPENSE STATUS: NORMAL
UNIT DISPENSE STATUS: NORMAL
UNIT PRODUCT CODE: NORMAL
UNIT PRODUCT CODE: NORMAL
UNIT PRODUCT VOLUME: 300 ML
UNIT PRODUCT VOLUME: 350 ML
UNIT RH: NORMAL
UNIT RH: NORMAL
UROBILINOGEN UR STRIP-ACNC: <2 MG/DL
VANCOMYCIN TROUGH SERPL-MCNC: 10.4 UG/ML (ref 10–20)
VANCOMYCIN TROUGH SERPL-MCNC: 27 UG/ML (ref 10–20)
VARIANT LYMPHS # BLD AUTO: 1 %
VENTRICULAR RATE: 99 BPM
WBC # BLD AUTO: 10.17 THOUSAND/UL (ref 4.31–10.16)
WBC # BLD AUTO: 10.58 THOUSAND/UL (ref 4.31–10.16)
WBC # BLD AUTO: 10.63 THOUSAND/UL (ref 4.31–10.16)
WBC # BLD AUTO: 10.74 THOUSAND/UL (ref 4.31–10.16)
WBC # BLD AUTO: 10.86 THOUSAND/UL (ref 4.31–10.16)
WBC # BLD AUTO: 11.25 THOUSAND/UL (ref 4.31–10.16)
WBC # BLD AUTO: 11.31 THOUSAND/UL (ref 4.31–10.16)
WBC # BLD AUTO: 11.5 THOUSAND/UL (ref 4.31–10.16)
WBC # BLD AUTO: 11.8 THOUSAND/UL (ref 4.31–10.16)
WBC # BLD AUTO: 11.85 THOUSAND/UL (ref 4.31–10.16)
WBC # BLD AUTO: 12.14 THOUSAND/UL (ref 4.31–10.16)
WBC # BLD AUTO: 12.22 THOUSAND/UL (ref 4.31–10.16)
WBC # BLD AUTO: 12.28 THOUSAND/UL (ref 4.31–10.16)
WBC # BLD AUTO: 12.28 THOUSAND/UL (ref 4.31–10.16)
WBC # BLD AUTO: 12.43 THOUSAND/UL (ref 4.31–10.16)
WBC # BLD AUTO: 12.74 THOUSAND/UL (ref 4.31–10.16)
WBC # BLD AUTO: 12.85 THOUSAND/UL (ref 4.31–10.16)
WBC # BLD AUTO: 13.08 THOUSAND/UL (ref 4.31–10.16)
WBC # BLD AUTO: 13.42 THOUSAND/UL (ref 4.31–10.16)
WBC # BLD AUTO: 13.5 THOUSAND/UL (ref 4.31–10.16)
WBC # BLD AUTO: 13.5 THOUSAND/UL (ref 4.31–10.16)
WBC # BLD AUTO: 13.79 THOUSAND/UL (ref 4.31–10.16)
WBC # BLD AUTO: 13.91 THOUSAND/UL (ref 4.31–10.16)
WBC # BLD AUTO: 14 THOUSAND/UL (ref 4.31–10.16)
WBC # BLD AUTO: 14.01 THOUSAND/UL (ref 4.31–10.16)
WBC # BLD AUTO: 14.2 THOUSAND/UL (ref 4.31–10.16)
WBC # BLD AUTO: 14.24 THOUSAND/UL (ref 4.31–10.16)
WBC # BLD AUTO: 14.5 THOUSAND/UL (ref 4.31–10.16)
WBC # BLD AUTO: 14.78 THOUSAND/UL (ref 4.31–10.16)
WBC # BLD AUTO: 14.97 THOUSAND/UL (ref 4.31–10.16)
WBC # BLD AUTO: 15.34 THOUSAND/UL (ref 4.31–10.16)
WBC # BLD AUTO: 16.57 THOUSAND/UL (ref 4.31–10.16)
WBC # BLD AUTO: 18.02 THOUSAND/UL (ref 4.31–10.16)
WBC # BLD AUTO: 24.02 THOUSAND/UL (ref 4.31–10.16)
WBC # BLD AUTO: 31.68 THOUSAND/UL (ref 4.31–10.16)
WBC # BLD AUTO: 8.33 THOUSAND/UL (ref 4.31–10.16)
WBC # BLD AUTO: 8.61 THOUSAND/UL (ref 4.31–10.16)
WBC # BLD AUTO: 8.63 THOUSAND/UL (ref 4.31–10.16)
WBC # BLD AUTO: 8.76 THOUSAND/UL (ref 4.31–10.16)
WBC # BLD AUTO: 8.94 THOUSAND/UL (ref 4.31–10.16)
WBC # BLD AUTO: 8.95 THOUSAND/UL (ref 4.31–10.16)
WBC # BLD AUTO: 9.06 THOUSAND/UL (ref 4.31–10.16)
WBC # BLD AUTO: 9.49 THOUSAND/UL (ref 4.31–10.16)
WBC # BLD AUTO: 9.84 THOUSAND/UL (ref 4.31–10.16)
WBC #/AREA URNS AUTO: ABNORMAL /HPF
WBC CLUMPS # UR AUTO: PRESENT /UL

## 2022-01-01 PROCEDURE — 02HV33Z INSERTION OF INFUSION DEVICE INTO SUPERIOR VENA CAVA, PERCUTANEOUS APPROACH: ICD-10-PCS | Performed by: INTERNAL MEDICINE

## 2022-01-01 PROCEDURE — 30233N1 TRANSFUSION OF NONAUTOLOGOUS RED BLOOD CELLS INTO PERIPHERAL VEIN, PERCUTANEOUS APPROACH: ICD-10-PCS | Performed by: INTERNAL MEDICINE

## 2022-01-01 PROCEDURE — XW023W7 INTRODUCTION OF COVID-19 VACCINE BOOSTER INTO MUSCLE, PERCUTANEOUS APPROACH, NEW TECHNOLOGY GROUP 7: ICD-10-PCS | Performed by: INTERNAL MEDICINE

## 2022-01-01 PROCEDURE — 0DHA3UZ INSERTION OF FEEDING DEVICE INTO JEJUNUM, PERCUTANEOUS APPROACH: ICD-10-PCS | Performed by: INTERNAL MEDICINE

## 2022-01-01 PROCEDURE — 0DHA4UZ INSERTION OF FEEDING DEVICE INTO JEJUNUM, PERCUTANEOUS ENDOSCOPIC APPROACH: ICD-10-PCS | Performed by: INTERNAL MEDICINE

## 2022-01-01 PROCEDURE — 0DJ08ZZ INSPECTION OF UPPER INTESTINAL TRACT, VIA NATURAL OR ARTIFICIAL OPENING ENDOSCOPIC: ICD-10-PCS | Performed by: INTERNAL MEDICINE

## 2022-01-01 RX ORDER — CALCIUM GLUCONATE 20 MG/ML
1 INJECTION, SOLUTION INTRAVENOUS ONCE
Status: COMPLETED | OUTPATIENT
Start: 2022-01-01 | End: 2022-01-01

## 2022-01-01 RX ORDER — CEFAZOLIN SODIUM 1 G/50ML
1000 SOLUTION INTRAVENOUS ONCE
Status: COMPLETED | OUTPATIENT
Start: 2022-01-01 | End: 2022-01-01

## 2022-01-01 RX ORDER — METOCLOPRAMIDE HYDROCHLORIDE 5 MG/ML
10 INJECTION INTRAMUSCULAR; INTRAVENOUS EVERY 6 HOURS PRN
Status: DISCONTINUED | OUTPATIENT
Start: 2022-01-01 | End: 2022-01-01 | Stop reason: HOSPADM

## 2022-01-01 RX ORDER — ALBUMIN (HUMAN) 12.5 G/50ML
25 SOLUTION INTRAVENOUS ONCE
Status: COMPLETED | OUTPATIENT
Start: 2022-01-01 | End: 2022-01-01

## 2022-01-01 RX ORDER — TRAZODONE HYDROCHLORIDE 50 MG/1
150 TABLET ORAL
Status: DISCONTINUED | OUTPATIENT
Start: 2022-01-01 | End: 2022-01-01 | Stop reason: HOSPADM

## 2022-01-01 RX ORDER — ALBUMIN (HUMAN) 12.5 G/50ML
25 SOLUTION INTRAVENOUS 2 TIMES DAILY
Status: COMPLETED | OUTPATIENT
Start: 2022-01-01 | End: 2022-01-01

## 2022-01-01 RX ORDER — SODIUM CHLORIDE 9 MG/ML
75 INJECTION, SOLUTION INTRAVENOUS ONCE
Status: COMPLETED | OUTPATIENT
Start: 2022-01-01 | End: 2022-01-01

## 2022-01-01 RX ORDER — SODIUM CHLORIDE 9 MG/ML
50 INJECTION, SOLUTION INTRAVENOUS CONTINUOUS
Status: DISPENSED | OUTPATIENT
Start: 2022-01-01 | End: 2022-01-01

## 2022-01-01 RX ORDER — VANCOMYCIN HYDROCHLORIDE 1 G/200ML
15 INJECTION, SOLUTION INTRAVENOUS EVERY 24 HOURS
Status: DISCONTINUED | OUTPATIENT
Start: 2022-01-01 | End: 2022-01-01

## 2022-01-01 RX ORDER — SODIUM CHLORIDE 9 MG/ML
50 INJECTION, SOLUTION INTRAVENOUS CONTINUOUS
Status: DISCONTINUED | OUTPATIENT
Start: 2022-01-01 | End: 2022-01-01

## 2022-01-01 RX ORDER — FUROSEMIDE 10 MG/ML
20 INJECTION INTRAMUSCULAR; INTRAVENOUS ONCE
Status: DISCONTINUED | OUTPATIENT
Start: 2022-01-01 | End: 2022-01-01

## 2022-01-01 RX ORDER — BISACODYL 10 MG
10 SUPPOSITORY, RECTAL RECTAL DAILY PRN
Status: DISCONTINUED | OUTPATIENT
Start: 2022-01-01 | End: 2022-01-01 | Stop reason: HOSPADM

## 2022-01-01 RX ORDER — HEPARIN SODIUM 5000 [USP'U]/ML
5000 INJECTION, SOLUTION INTRAVENOUS; SUBCUTANEOUS EVERY 8 HOURS SCHEDULED
Status: DISCONTINUED | OUTPATIENT
Start: 2022-01-01 | End: 2022-01-01

## 2022-01-01 RX ORDER — POLYETHYLENE GLYCOL 3350 17 G/17G
17 POWDER, FOR SOLUTION ORAL 2 TIMES DAILY
Status: DISCONTINUED | OUTPATIENT
Start: 2022-01-01 | End: 2022-01-01 | Stop reason: HOSPADM

## 2022-01-01 RX ORDER — BISACODYL 10 MG
10 SUPPOSITORY, RECTAL RECTAL DAILY PRN
Status: DISCONTINUED | OUTPATIENT
Start: 2022-01-01 | End: 2022-01-01

## 2022-01-01 RX ORDER — SODIUM CHLORIDE 9 MG/ML
100 INJECTION, SOLUTION INTRAVENOUS CONTINUOUS
Status: DISPENSED | OUTPATIENT
Start: 2022-01-01 | End: 2022-01-01

## 2022-01-01 RX ORDER — SODIUM CHLORIDE 1000 MG
1 TABLET, SOLUBLE MISCELLANEOUS 2 TIMES DAILY WITH MEALS
Status: DISCONTINUED | OUTPATIENT
Start: 2022-01-01 | End: 2022-01-01

## 2022-01-01 RX ORDER — OXYCODONE HYDROCHLORIDE 5 MG/1
2.5 TABLET ORAL EVERY 6 HOURS
Status: DISCONTINUED | OUTPATIENT
Start: 2022-01-01 | End: 2022-01-01

## 2022-01-01 RX ORDER — POTASSIUM CHLORIDE 20 MEQ/1
40 TABLET, EXTENDED RELEASE ORAL ONCE
Status: COMPLETED | OUTPATIENT
Start: 2022-01-01 | End: 2022-01-01

## 2022-01-01 RX ORDER — SODIUM CHLORIDE, SODIUM GLUCONATE, SODIUM ACETATE, POTASSIUM CHLORIDE, MAGNESIUM CHLORIDE, SODIUM PHOSPHATE, DIBASIC, AND POTASSIUM PHOSPHATE .53; .5; .37; .037; .03; .012; .00082 G/100ML; G/100ML; G/100ML; G/100ML; G/100ML; G/100ML; G/100ML
100 INJECTION, SOLUTION INTRAVENOUS CONTINUOUS
Status: DISCONTINUED | OUTPATIENT
Start: 2022-01-01 | End: 2022-01-01

## 2022-01-01 RX ORDER — BISACODYL 10 MG
10 SUPPOSITORY, RECTAL RECTAL ONCE
Status: DISCONTINUED | OUTPATIENT
Start: 2022-01-01 | End: 2022-01-01

## 2022-01-01 RX ORDER — MIDODRINE HYDROCHLORIDE 5 MG/1
5 TABLET ORAL
Status: DISCONTINUED | OUTPATIENT
Start: 2022-01-01 | End: 2022-01-01

## 2022-01-01 RX ORDER — LEVOTHYROXINE SODIUM 0.12 MG/1
125 TABLET ORAL EVERY 24 HOURS
Status: DISCONTINUED | OUTPATIENT
Start: 2022-01-01 | End: 2022-01-01 | Stop reason: HOSPADM

## 2022-01-01 RX ORDER — MIDODRINE HYDROCHLORIDE 5 MG/1
7.5 TABLET ORAL
Status: DISCONTINUED | OUTPATIENT
Start: 2022-01-01 | End: 2022-01-01

## 2022-01-01 RX ORDER — SODIUM CHLORIDE 1000 MG
1 TABLET, SOLUBLE MISCELLANEOUS
Status: DISCONTINUED | OUTPATIENT
Start: 2022-01-01 | End: 2022-01-01

## 2022-01-01 RX ORDER — POLYETHYLENE GLYCOL 3350 17 G/17G
17 POWDER, FOR SOLUTION ORAL DAILY PRN
Status: DISCONTINUED | OUTPATIENT
Start: 2022-01-01 | End: 2022-01-01

## 2022-01-01 RX ORDER — POLYETHYLENE GLYCOL 3350 17 G/17G
17 POWDER, FOR SOLUTION ORAL DAILY
Status: DISCONTINUED | OUTPATIENT
Start: 2022-01-01 | End: 2022-01-01

## 2022-01-01 RX ORDER — ACETAMINOPHEN 325 MG/1
650 TABLET ORAL EVERY 6 HOURS PRN
Status: DISCONTINUED | OUTPATIENT
Start: 2022-01-01 | End: 2022-01-01

## 2022-01-01 RX ORDER — SODIUM POLYSTYRENE SULFONATE 4.1 MEQ/G
15 POWDER, FOR SUSPENSION ORAL; RECTAL ONCE
Status: COMPLETED | OUTPATIENT
Start: 2022-01-01 | End: 2022-01-01

## 2022-01-01 RX ORDER — ONDANSETRON 2 MG/ML
4 INJECTION INTRAMUSCULAR; INTRAVENOUS EVERY 6 HOURS PRN
Status: DISCONTINUED | OUTPATIENT
Start: 2022-01-01 | End: 2022-01-01 | Stop reason: HOSPADM

## 2022-01-01 RX ORDER — ACETAMINOPHEN 325 MG/1
975 TABLET ORAL EVERY 6 HOURS PRN
Status: DISCONTINUED | OUTPATIENT
Start: 2022-01-01 | End: 2022-01-01 | Stop reason: HOSPADM

## 2022-01-01 RX ORDER — SUCRALFATE 1 G/1
1 TABLET ORAL
Status: DISCONTINUED | OUTPATIENT
Start: 2022-01-01 | End: 2022-01-01

## 2022-01-01 RX ORDER — MIDODRINE HYDROCHLORIDE 5 MG/1
5 TABLET ORAL
Status: DISCONTINUED | OUTPATIENT
Start: 2022-01-01 | End: 2022-01-01 | Stop reason: HOSPADM

## 2022-01-01 RX ORDER — GUAIFENESIN 600 MG/1
600 TABLET, EXTENDED RELEASE ORAL EVERY 12 HOURS SCHEDULED
Status: DISCONTINUED | OUTPATIENT
Start: 2022-01-01 | End: 2022-01-01 | Stop reason: HOSPADM

## 2022-01-01 RX ORDER — LEVOTHYROXINE SODIUM 112 UG/1
112 TABLET ORAL EVERY 24 HOURS
Status: DISCONTINUED | OUTPATIENT
Start: 2022-01-01 | End: 2022-01-01

## 2022-01-01 RX ORDER — CLONAZEPAM 1 MG/1
3 TABLET ORAL
Status: DISCONTINUED | OUTPATIENT
Start: 2022-01-01 | End: 2022-01-01 | Stop reason: HOSPADM

## 2022-01-01 RX ORDER — PANTOPRAZOLE SODIUM 40 MG/10ML
40 INJECTION, POWDER, LYOPHILIZED, FOR SOLUTION INTRAVENOUS EVERY 12 HOURS SCHEDULED
Status: DISCONTINUED | OUTPATIENT
Start: 2022-01-01 | End: 2022-01-01

## 2022-01-01 RX ORDER — LEVOTHYROXINE SODIUM 112 UG/1
112 TABLET ORAL
Status: DISCONTINUED | OUTPATIENT
Start: 2022-01-01 | End: 2022-01-01

## 2022-01-01 RX ORDER — SODIUM BICARBONATE 650 MG/1
650 TABLET ORAL
Status: DISCONTINUED | OUTPATIENT
Start: 2022-01-01 | End: 2022-01-01

## 2022-01-01 RX ORDER — SODIUM CHLORIDE 9 MG/ML
100 INJECTION, SOLUTION INTRAVENOUS CONTINUOUS
Status: DISCONTINUED | OUTPATIENT
Start: 2022-01-01 | End: 2022-01-01

## 2022-01-01 RX ORDER — FOLIC ACID 1 MG/1
1 TABLET ORAL DAILY
Status: DISCONTINUED | OUTPATIENT
Start: 2022-01-01 | End: 2022-01-01 | Stop reason: HOSPADM

## 2022-01-01 RX ORDER — SODIUM CHLORIDE 9 MG/ML
75 INJECTION, SOLUTION INTRAVENOUS CONTINUOUS
Status: DISPENSED | OUTPATIENT
Start: 2022-01-01 | End: 2022-01-01

## 2022-01-01 RX ORDER — ACETAMINOPHEN 325 MG/1
975 TABLET ORAL ONCE
Status: COMPLETED | OUTPATIENT
Start: 2022-01-01 | End: 2022-01-01

## 2022-01-01 RX ORDER — SODIUM CHLORIDE 9 MG/ML
75 INJECTION, SOLUTION INTRAVENOUS CONTINUOUS
Status: DISCONTINUED | OUTPATIENT
Start: 2022-01-01 | End: 2022-01-01

## 2022-01-01 RX ORDER — LEVALBUTEROL INHALATION SOLUTION 0.63 MG/3ML
0.63 SOLUTION RESPIRATORY (INHALATION) EVERY 6 HOURS PRN
Status: DISCONTINUED | OUTPATIENT
Start: 2022-01-01 | End: 2022-01-01

## 2022-01-01 RX ORDER — DEXTROSE MONOHYDRATE 25 G/50ML
25 INJECTION, SOLUTION INTRAVENOUS ONCE
Status: COMPLETED | OUTPATIENT
Start: 2022-01-01 | End: 2022-01-01

## 2022-01-01 RX ORDER — HEPARIN SODIUM 5000 [USP'U]/ML
5000 INJECTION, SOLUTION INTRAVENOUS; SUBCUTANEOUS EVERY 8 HOURS SCHEDULED
Status: DISCONTINUED | OUTPATIENT
Start: 2022-01-01 | End: 2022-01-01 | Stop reason: HOSPADM

## 2022-01-01 RX ORDER — OXYCODONE HYDROCHLORIDE 5 MG/1
2.5 TABLET ORAL EVERY 6 HOURS PRN
Status: DISCONTINUED | OUTPATIENT
Start: 2022-01-01 | End: 2022-01-01

## 2022-01-01 RX ORDER — MIDODRINE HYDROCHLORIDE 5 MG/1
10 TABLET ORAL
Status: DISCONTINUED | OUTPATIENT
Start: 2022-01-01 | End: 2022-01-01

## 2022-01-01 RX ORDER — ALBUTEROL SULFATE 2.5 MG/3ML
2.5 SOLUTION RESPIRATORY (INHALATION) EVERY 6 HOURS PRN
Status: DISCONTINUED | OUTPATIENT
Start: 2022-01-01 | End: 2022-01-01

## 2022-01-01 RX ORDER — VANCOMYCIN HYDROCHLORIDE 500 MG/100ML
500 INJECTION, SOLUTION INTRAVENOUS EVERY 24 HOURS
Status: DISCONTINUED | OUTPATIENT
Start: 2022-01-01 | End: 2022-01-01

## 2022-01-01 RX ORDER — FAMOTIDINE 20 MG/1
10 TABLET, FILM COATED ORAL DAILY
Status: DISCONTINUED | OUTPATIENT
Start: 2022-01-01 | End: 2022-01-01

## 2022-01-01 RX ORDER — ALPRAZOLAM 0.25 MG/1
0.25 TABLET ORAL 3 TIMES DAILY PRN
Status: DISCONTINUED | OUTPATIENT
Start: 2022-01-01 | End: 2022-01-01 | Stop reason: HOSPADM

## 2022-01-01 RX ORDER — SENNOSIDES 8.6 MG
2 TABLET ORAL
Status: DISCONTINUED | OUTPATIENT
Start: 2022-01-01 | End: 2022-01-01 | Stop reason: HOSPADM

## 2022-01-01 RX ORDER — SODIUM CHLORIDE 9 MG/ML
125 INJECTION, SOLUTION INTRAVENOUS CONTINUOUS
Status: CANCELLED | OUTPATIENT
Start: 2022-01-01

## 2022-01-01 RX ORDER — LANOLIN ALCOHOL/MO/W.PET/CERES
100 CREAM (GRAM) TOPICAL DAILY
Status: DISCONTINUED | OUTPATIENT
Start: 2022-01-01 | End: 2022-01-01 | Stop reason: HOSPADM

## 2022-01-01 RX ORDER — OXYCODONE HYDROCHLORIDE 5 MG/1
5 TABLET ORAL EVERY 6 HOURS
Status: DISCONTINUED | OUTPATIENT
Start: 2022-01-01 | End: 2022-01-01 | Stop reason: HOSPADM

## 2022-01-01 RX ORDER — SODIUM CHLORIDE 1000 MG
1 TABLET, SOLUBLE MISCELLANEOUS 3 TIMES DAILY
Status: DISCONTINUED | OUTPATIENT
Start: 2022-01-01 | End: 2022-01-01

## 2022-01-01 RX ORDER — PANTOPRAZOLE SODIUM 40 MG/1
40 TABLET, DELAYED RELEASE ORAL
Status: DISCONTINUED | OUTPATIENT
Start: 2022-01-01 | End: 2022-01-01 | Stop reason: HOSPADM

## 2022-01-01 RX ORDER — SODIUM CHLORIDE 1000 MG
2 TABLET, SOLUBLE MISCELLANEOUS 3 TIMES DAILY
Status: DISCONTINUED | OUTPATIENT
Start: 2022-01-01 | End: 2022-01-01 | Stop reason: HOSPADM

## 2022-01-01 RX ORDER — PANTOPRAZOLE SODIUM 40 MG/1
40 TABLET, DELAYED RELEASE ORAL
Status: DISCONTINUED | OUTPATIENT
Start: 2022-01-01 | End: 2022-01-01

## 2022-01-01 RX ORDER — DEXTROSE AND SODIUM CHLORIDE 5; .9 G/100ML; G/100ML
50 INJECTION, SOLUTION INTRAVENOUS CONTINUOUS
Status: DISCONTINUED | OUTPATIENT
Start: 2022-01-01 | End: 2022-01-01

## 2022-01-01 RX ADMIN — SODIUM CHLORIDE TAB 1 GM 2 G: 1 TAB at 21:21

## 2022-01-01 RX ADMIN — Medication 125 MG: at 00:34

## 2022-01-01 RX ADMIN — SUCRALFATE 1 G: 1 TABLET ORAL at 13:54

## 2022-01-01 RX ADMIN — HEPARIN SODIUM 5000 UNITS: 5000 INJECTION INTRAVENOUS; SUBCUTANEOUS at 05:45

## 2022-01-01 RX ADMIN — SUCRALFATE 1 G: 1 TABLET ORAL at 17:31

## 2022-01-01 RX ADMIN — CALCIUM GLUCONATE 1 G: 20 INJECTION, SOLUTION INTRAVENOUS at 20:03

## 2022-01-01 RX ADMIN — LEVOTHYROXINE SODIUM 112 MCG: 112 TABLET ORAL at 05:56

## 2022-01-01 RX ADMIN — Medication 125 MG: at 12:22

## 2022-01-01 RX ADMIN — VANCOMYCIN HYDROCHLORIDE 1000 MG: 1 INJECTION, SOLUTION INTRAVENOUS at 06:10

## 2022-01-01 RX ADMIN — OXYCODONE HYDROCHLORIDE 5 MG: 5 TABLET ORAL at 11:27

## 2022-01-01 RX ADMIN — GUAIFENESIN 600 MG: 600 TABLET, EXTENDED RELEASE ORAL at 09:51

## 2022-01-01 RX ADMIN — SODIUM CHLORIDE TAB 1 GM 2 G: 1 TAB at 08:42

## 2022-01-01 RX ADMIN — SENNOSIDES 17.2 MG: 8.6 TABLET, FILM COATED ORAL at 21:31

## 2022-01-01 RX ADMIN — FOLIC ACID 1 MG: 1 TABLET ORAL at 11:42

## 2022-01-01 RX ADMIN — SODIUM CHLORIDE 100 ML/HR: 0.9 INJECTION, SOLUTION INTRAVENOUS at 21:56

## 2022-01-01 RX ADMIN — PANTOPRAZOLE SODIUM 40 MG: 40 TABLET, DELAYED RELEASE ORAL at 16:45

## 2022-01-01 RX ADMIN — CEFTRIAXONE SODIUM 1000 MG: 10 INJECTION, POWDER, FOR SOLUTION INTRAVENOUS at 17:31

## 2022-01-01 RX ADMIN — SODIUM CHLORIDE TAB 1 GM 1 G: 1 TAB at 17:58

## 2022-01-01 RX ADMIN — HEPARIN SODIUM 5000 UNITS: 5000 INJECTION INTRAVENOUS; SUBCUTANEOUS at 05:37

## 2022-01-01 RX ADMIN — MIDODRINE HYDROCHLORIDE 5 MG: 5 TABLET ORAL at 06:45

## 2022-01-01 RX ADMIN — MIDODRINE HYDROCHLORIDE 5 MG: 5 TABLET ORAL at 07:25

## 2022-01-01 RX ADMIN — OXYCODONE HYDROCHLORIDE 5 MG: 5 TABLET ORAL at 05:16

## 2022-01-01 RX ADMIN — PANTOPRAZOLE SODIUM 40 MG: 40 TABLET, DELAYED RELEASE ORAL at 17:32

## 2022-01-01 RX ADMIN — PANTOPRAZOLE SODIUM 40 MG: 40 INJECTION, POWDER, FOR SOLUTION INTRAVENOUS at 22:19

## 2022-01-01 RX ADMIN — LEVOTHYROXINE SODIUM 112 MCG: 112 TABLET ORAL at 05:17

## 2022-01-01 RX ADMIN — GUAIFENESIN 600 MG: 600 TABLET, EXTENDED RELEASE ORAL at 21:45

## 2022-01-01 RX ADMIN — GUAIFENESIN 600 MG: 600 TABLET, EXTENDED RELEASE ORAL at 22:11

## 2022-01-01 RX ADMIN — OXYCODONE HYDROCHLORIDE 5 MG: 5 TABLET ORAL at 21:29

## 2022-01-01 RX ADMIN — POTASSIUM CHLORIDE 40 MEQ: 1500 TABLET, EXTENDED RELEASE ORAL at 13:41

## 2022-01-01 RX ADMIN — GUAIFENESIN 600 MG: 600 TABLET, EXTENDED RELEASE ORAL at 20:42

## 2022-01-01 RX ADMIN — OXYCODONE HYDROCHLORIDE 5 MG: 5 TABLET ORAL at 17:51

## 2022-01-01 RX ADMIN — TRAZODONE HYDROCHLORIDE 150 MG: 50 TABLET ORAL at 22:43

## 2022-01-01 RX ADMIN — HEPARIN SODIUM 5000 UNITS: 5000 INJECTION INTRAVENOUS; SUBCUTANEOUS at 06:37

## 2022-01-01 RX ADMIN — SODIUM CHLORIDE TAB 1 GM 2 G: 1 TAB at 17:50

## 2022-01-01 RX ADMIN — HEPARIN SODIUM 5000 UNITS: 5000 INJECTION INTRAVENOUS; SUBCUTANEOUS at 14:57

## 2022-01-01 RX ADMIN — HEPARIN SODIUM 5000 UNITS: 5000 INJECTION INTRAVENOUS; SUBCUTANEOUS at 22:03

## 2022-01-01 RX ADMIN — MIDODRINE HYDROCHLORIDE 10 MG: 5 TABLET ORAL at 06:08

## 2022-01-01 RX ADMIN — ALBUMIN (HUMAN) 25 G: 0.25 INJECTION, SOLUTION INTRAVENOUS at 06:57

## 2022-01-01 RX ADMIN — LEVOTHYROXINE SODIUM 125 MCG: 125 TABLET ORAL at 13:30

## 2022-01-01 RX ADMIN — Medication 125 MG: at 17:43

## 2022-01-01 RX ADMIN — LEVOTHYROXINE SODIUM 112 MCG: 112 TABLET ORAL at 06:45

## 2022-01-01 RX ADMIN — LEVOTHYROXINE SODIUM 112 MCG: 112 TABLET ORAL at 06:13

## 2022-01-01 RX ADMIN — MIDODRINE HYDROCHLORIDE 10 MG: 5 TABLET ORAL at 06:30

## 2022-01-01 RX ADMIN — THIAMINE HCL TAB 100 MG 100 MG: 100 TAB at 07:58

## 2022-01-01 RX ADMIN — LEVOTHYROXINE SODIUM 112 MCG: 112 TABLET ORAL at 05:37

## 2022-01-01 RX ADMIN — Medication 125 MG: at 05:38

## 2022-01-01 RX ADMIN — PANTOPRAZOLE SODIUM 40 MG: 40 TABLET, DELAYED RELEASE ORAL at 06:29

## 2022-01-01 RX ADMIN — THIAMINE HCL TAB 100 MG 100 MG: 100 TAB at 09:26

## 2022-01-01 RX ADMIN — TRAZODONE HYDROCHLORIDE 150 MG: 50 TABLET ORAL at 21:23

## 2022-01-01 RX ADMIN — OXYCODONE HYDROCHLORIDE 5 MG: 5 TABLET ORAL at 16:05

## 2022-01-01 RX ADMIN — MIDODRINE HYDROCHLORIDE 10 MG: 5 TABLET ORAL at 18:01

## 2022-01-01 RX ADMIN — SODIUM BICARBONATE 650 MG TABLET 650 MG: at 07:47

## 2022-01-01 RX ADMIN — SUCRALFATE 1 G: 1 TABLET ORAL at 21:22

## 2022-01-01 RX ADMIN — TRAZODONE HYDROCHLORIDE 150 MG: 50 TABLET ORAL at 21:08

## 2022-01-01 RX ADMIN — PANTOPRAZOLE SODIUM 40 MG: 40 TABLET, DELAYED RELEASE ORAL at 16:30

## 2022-01-01 RX ADMIN — FOLIC ACID 1 MG: 1 TABLET ORAL at 11:00

## 2022-01-01 RX ADMIN — TRAZODONE HYDROCHLORIDE 150 MG: 50 TABLET ORAL at 21:53

## 2022-01-01 RX ADMIN — SUCRALFATE 1 G: 1 TABLET ORAL at 10:44

## 2022-01-01 RX ADMIN — HEPARIN SODIUM 5000 UNITS: 5000 INJECTION INTRAVENOUS; SUBCUTANEOUS at 17:51

## 2022-01-01 RX ADMIN — SODIUM CHLORIDE 1000 ML: 0.9 INJECTION, SOLUTION INTRAVENOUS at 02:14

## 2022-01-01 RX ADMIN — Medication 125 MG: at 13:03

## 2022-01-01 RX ADMIN — MIDODRINE HYDROCHLORIDE 5 MG: 5 TABLET ORAL at 14:00

## 2022-01-01 RX ADMIN — POLYETHYLENE GLYCOL 3350 17 G: 17 POWDER, FOR SOLUTION ORAL at 09:19

## 2022-01-01 RX ADMIN — Medication 125 MG: at 18:29

## 2022-01-01 RX ADMIN — FOLIC ACID 1 MG: 1 TABLET ORAL at 12:38

## 2022-01-01 RX ADMIN — TRAZODONE HYDROCHLORIDE 150 MG: 50 TABLET ORAL at 21:50

## 2022-01-01 RX ADMIN — HEPARIN SODIUM 5000 UNITS: 5000 INJECTION INTRAVENOUS; SUBCUTANEOUS at 04:45

## 2022-01-01 RX ADMIN — Medication 125 MG: at 05:52

## 2022-01-01 RX ADMIN — THIAMINE HCL TAB 100 MG 100 MG: 100 TAB at 11:14

## 2022-01-01 RX ADMIN — PANTOPRAZOLE SODIUM 40 MG: 40 INJECTION, POWDER, FOR SOLUTION INTRAVENOUS at 09:31

## 2022-01-01 RX ADMIN — PANTOPRAZOLE SODIUM 40 MG: 40 TABLET, DELAYED RELEASE ORAL at 17:23

## 2022-01-01 RX ADMIN — OXYCODONE HYDROCHLORIDE 5 MG: 5 TABLET ORAL at 22:07

## 2022-01-01 RX ADMIN — CEFTRIAXONE SODIUM 1000 MG: 10 INJECTION, POWDER, FOR SOLUTION INTRAVENOUS at 17:35

## 2022-01-01 RX ADMIN — MIDODRINE HYDROCHLORIDE 5 MG: 5 TABLET ORAL at 06:06

## 2022-01-01 RX ADMIN — INSULIN HUMAN 10 UNITS: 100 INJECTION, SOLUTION PARENTERAL at 17:58

## 2022-01-01 RX ADMIN — SUCRALFATE 1 G: 1 TABLET ORAL at 17:50

## 2022-01-01 RX ADMIN — SUCRALFATE 1 G: 1 TABLET ORAL at 21:37

## 2022-01-01 RX ADMIN — HEPARIN SODIUM 5000 UNITS: 5000 INJECTION INTRAVENOUS; SUBCUTANEOUS at 21:40

## 2022-01-01 RX ADMIN — Medication 125 MG: at 08:47

## 2022-01-01 RX ADMIN — MIDODRINE HYDROCHLORIDE 5 MG: 5 TABLET ORAL at 16:52

## 2022-01-01 RX ADMIN — GUAIFENESIN 600 MG: 600 TABLET, EXTENDED RELEASE ORAL at 11:20

## 2022-01-01 RX ADMIN — SODIUM CHLORIDE TAB 1 GM 2 G: 1 TAB at 09:47

## 2022-01-01 RX ADMIN — SODIUM CHLORIDE TAB 1 GM 2 G: 1 TAB at 17:05

## 2022-01-01 RX ADMIN — SODIUM CHLORIDE TAB 1 GM 2 G: 1 TAB at 10:58

## 2022-01-01 RX ADMIN — OXYCODONE HYDROCHLORIDE 5 MG: 5 TABLET ORAL at 05:37

## 2022-01-01 RX ADMIN — TRAZODONE HYDROCHLORIDE 150 MG: 50 TABLET ORAL at 21:38

## 2022-01-01 RX ADMIN — MIDODRINE HYDROCHLORIDE 10 MG: 5 TABLET ORAL at 06:07

## 2022-01-01 RX ADMIN — SODIUM CHLORIDE 50 ML/HR: 0.9 INJECTION, SOLUTION INTRAVENOUS at 22:10

## 2022-01-01 RX ADMIN — OXYCODONE HYDROCHLORIDE 5 MG: 5 TABLET ORAL at 22:42

## 2022-01-01 RX ADMIN — HEPARIN SODIUM 5000 UNITS: 5000 INJECTION INTRAVENOUS; SUBCUTANEOUS at 09:57

## 2022-01-01 RX ADMIN — MIDODRINE HYDROCHLORIDE 5 MG: 5 TABLET ORAL at 11:17

## 2022-01-01 RX ADMIN — PANTOPRAZOLE SODIUM 40 MG: 40 TABLET, DELAYED RELEASE ORAL at 06:07

## 2022-01-01 RX ADMIN — MIDODRINE HYDROCHLORIDE 5 MG: 5 TABLET ORAL at 17:58

## 2022-01-01 RX ADMIN — HEPARIN SODIUM 5000 UNITS: 5000 INJECTION INTRAVENOUS; SUBCUTANEOUS at 13:48

## 2022-01-01 RX ADMIN — HEPARIN SODIUM 5000 UNITS: 5000 INJECTION INTRAVENOUS; SUBCUTANEOUS at 21:19

## 2022-01-01 RX ADMIN — POLYETHYLENE GLYCOL 3350 17 G: 17 POWDER, FOR SOLUTION ORAL at 22:04

## 2022-01-01 RX ADMIN — SODIUM BICARBONATE 650 MG TABLET 650 MG: at 09:47

## 2022-01-01 RX ADMIN — MIDODRINE HYDROCHLORIDE 5 MG: 5 TABLET ORAL at 06:10

## 2022-01-01 RX ADMIN — Medication 125 MG: at 05:17

## 2022-01-01 RX ADMIN — SUCRALFATE 1 G: 1 TABLET ORAL at 22:15

## 2022-01-01 RX ADMIN — HEPARIN SODIUM 5000 UNITS: 5000 INJECTION INTRAVENOUS; SUBCUTANEOUS at 06:09

## 2022-01-01 RX ADMIN — SUCRALFATE 1 G: 1 TABLET ORAL at 12:05

## 2022-01-01 RX ADMIN — CLONAZEPAM 3 MG: 1 TABLET ORAL at 00:30

## 2022-01-01 RX ADMIN — Medication 125 MG: at 23:27

## 2022-01-01 RX ADMIN — TRAZODONE HYDROCHLORIDE 150 MG: 50 TABLET ORAL at 21:27

## 2022-01-01 RX ADMIN — THIAMINE HCL TAB 100 MG 100 MG: 100 TAB at 09:29

## 2022-01-01 RX ADMIN — THIAMINE HCL TAB 100 MG 100 MG: 100 TAB at 08:03

## 2022-01-01 RX ADMIN — HEPARIN SODIUM 5000 UNITS: 5000 INJECTION INTRAVENOUS; SUBCUTANEOUS at 13:53

## 2022-01-01 RX ADMIN — HEPARIN SODIUM 5000 UNITS: 5000 INJECTION INTRAVENOUS; SUBCUTANEOUS at 23:14

## 2022-01-01 RX ADMIN — SUCRALFATE 1 G: 1 TABLET ORAL at 16:42

## 2022-01-01 RX ADMIN — OXYCODONE HYDROCHLORIDE 5 MG: 5 TABLET ORAL at 17:01

## 2022-01-01 RX ADMIN — TRAZODONE HYDROCHLORIDE 150 MG: 50 TABLET ORAL at 22:32

## 2022-01-01 RX ADMIN — CLONAZEPAM 3 MG: 1 TABLET ORAL at 21:40

## 2022-01-01 RX ADMIN — OXYCODONE HYDROCHLORIDE 5 MG: 5 TABLET ORAL at 16:54

## 2022-01-01 RX ADMIN — SUCRALFATE 1 G: 1 TABLET ORAL at 11:40

## 2022-01-01 RX ADMIN — Medication 125 MG: at 17:30

## 2022-01-01 RX ADMIN — MIDODRINE HYDROCHLORIDE 10 MG: 5 TABLET ORAL at 08:59

## 2022-01-01 RX ADMIN — MIDODRINE HYDROCHLORIDE 5 MG: 5 TABLET ORAL at 16:08

## 2022-01-01 RX ADMIN — FOLIC ACID 1 MG: 1 TABLET ORAL at 07:59

## 2022-01-01 RX ADMIN — HEPARIN SODIUM 5000 UNITS: 5000 INJECTION INTRAVENOUS; SUBCUTANEOUS at 06:04

## 2022-01-01 RX ADMIN — MIDODRINE HYDROCHLORIDE 5 MG: 5 TABLET ORAL at 17:02

## 2022-01-01 RX ADMIN — POLYETHYLENE GLYCOL 3350 17 G: 17 POWDER, FOR SOLUTION ORAL at 20:43

## 2022-01-01 RX ADMIN — PANTOPRAZOLE SODIUM 40 MG: 40 TABLET, DELAYED RELEASE ORAL at 05:37

## 2022-01-01 RX ADMIN — MIDODRINE HYDROCHLORIDE 10 MG: 5 TABLET ORAL at 13:21

## 2022-01-01 RX ADMIN — PANCRELIPASE 6000 UNITS: 30000; 6000; 19000 CAPSULE, DELAYED RELEASE PELLETS ORAL at 11:13

## 2022-01-01 RX ADMIN — LEVOTHYROXINE SODIUM 112 MCG: 112 TABLET ORAL at 05:27

## 2022-01-01 RX ADMIN — SODIUM CHLORIDE TAB 1 GM 2 G: 1 TAB at 15:25

## 2022-01-01 RX ADMIN — LEVOTHYROXINE SODIUM 112 MCG: 112 TABLET ORAL at 06:28

## 2022-01-01 RX ADMIN — SODIUM CHLORIDE TAB 1 GM 2 G: 1 TAB at 18:08

## 2022-01-01 RX ADMIN — TRAZODONE HYDROCHLORIDE 150 MG: 50 TABLET ORAL at 21:31

## 2022-01-01 RX ADMIN — Medication 125 MG: at 12:52

## 2022-01-01 RX ADMIN — FOLIC ACID 1 MG: 1 TABLET ORAL at 07:54

## 2022-01-01 RX ADMIN — MIDODRINE HYDROCHLORIDE 5 MG: 5 TABLET ORAL at 07:59

## 2022-01-01 RX ADMIN — SODIUM CHLORIDE TAB 1 GM 2 G: 1 TAB at 11:13

## 2022-01-01 RX ADMIN — SODIUM BICARBONATE 650 MG TABLET 650 MG: at 10:59

## 2022-01-01 RX ADMIN — HEPARIN SODIUM 5000 UNITS: 5000 INJECTION INTRAVENOUS; SUBCUTANEOUS at 14:40

## 2022-01-01 RX ADMIN — SODIUM CHLORIDE TAB 1 GM 2 G: 1 TAB at 08:15

## 2022-01-01 RX ADMIN — MIDODRINE HYDROCHLORIDE 5 MG: 5 TABLET ORAL at 13:53

## 2022-01-01 RX ADMIN — GUAIFENESIN 600 MG: 600 TABLET, EXTENDED RELEASE ORAL at 09:41

## 2022-01-01 RX ADMIN — POLYETHYLENE GLYCOL 3350 17 G: 17 POWDER, FOR SOLUTION ORAL at 10:54

## 2022-01-01 RX ADMIN — OXYCODONE HYDROCHLORIDE 5 MG: 5 TABLET ORAL at 17:56

## 2022-01-01 RX ADMIN — THIAMINE HCL TAB 100 MG 100 MG: 100 TAB at 08:29

## 2022-01-01 RX ADMIN — MIDODRINE HYDROCHLORIDE 5 MG: 5 TABLET ORAL at 06:19

## 2022-01-01 RX ADMIN — OXYCODONE HYDROCHLORIDE 5 MG: 5 TABLET ORAL at 05:51

## 2022-01-01 RX ADMIN — OXYCODONE HYDROCHLORIDE 5 MG: 5 TABLET ORAL at 08:03

## 2022-01-01 RX ADMIN — HEPARIN SODIUM 5000 UNITS: 5000 INJECTION INTRAVENOUS; SUBCUTANEOUS at 06:49

## 2022-01-01 RX ADMIN — SUCRALFATE 1 G: 1 TABLET ORAL at 06:11

## 2022-01-01 RX ADMIN — THIAMINE HCL TAB 100 MG 100 MG: 100 TAB at 11:42

## 2022-01-01 RX ADMIN — TRAZODONE HYDROCHLORIDE 150 MG: 50 TABLET ORAL at 21:18

## 2022-01-01 RX ADMIN — CLONAZEPAM 3 MG: 1 TABLET ORAL at 23:10

## 2022-01-01 RX ADMIN — THIAMINE HCL TAB 100 MG 100 MG: 100 TAB at 09:27

## 2022-01-01 RX ADMIN — SUCRALFATE 1 G: 1 TABLET ORAL at 12:03

## 2022-01-01 RX ADMIN — TRAZODONE HYDROCHLORIDE 150 MG: 50 TABLET ORAL at 22:07

## 2022-01-01 RX ADMIN — CLONAZEPAM 3 MG: 1 TABLET ORAL at 21:15

## 2022-01-01 RX ADMIN — SUCRALFATE 1 G: 1 TABLET ORAL at 21:30

## 2022-01-01 RX ADMIN — THIAMINE HCL TAB 100 MG 100 MG: 100 TAB at 08:46

## 2022-01-01 RX ADMIN — THIAMINE HCL TAB 100 MG 100 MG: 100 TAB at 08:41

## 2022-01-01 RX ADMIN — MIDODRINE HYDROCHLORIDE 10 MG: 5 TABLET ORAL at 17:05

## 2022-01-01 RX ADMIN — HEPARIN SODIUM 5000 UNITS: 5000 INJECTION INTRAVENOUS; SUBCUTANEOUS at 14:06

## 2022-01-01 RX ADMIN — PANTOPRAZOLE SODIUM 40 MG: 40 TABLET, DELAYED RELEASE ORAL at 17:55

## 2022-01-01 RX ADMIN — MIDODRINE HYDROCHLORIDE 5 MG: 5 TABLET ORAL at 17:32

## 2022-01-01 RX ADMIN — HEPARIN SODIUM 5000 UNITS: 5000 INJECTION INTRAVENOUS; SUBCUTANEOUS at 15:54

## 2022-01-01 RX ADMIN — GUAIFENESIN 600 MG: 600 TABLET, EXTENDED RELEASE ORAL at 10:57

## 2022-01-01 RX ADMIN — MIDODRINE HYDROCHLORIDE 5 MG: 5 TABLET ORAL at 17:31

## 2022-01-01 RX ADMIN — HEPARIN SODIUM 5000 UNITS: 5000 INJECTION INTRAVENOUS; SUBCUTANEOUS at 15:12

## 2022-01-01 RX ADMIN — SODIUM CHLORIDE TAB 1 GM 2 G: 1 TAB at 21:33

## 2022-01-01 RX ADMIN — GUAIFENESIN 600 MG: 600 TABLET, EXTENDED RELEASE ORAL at 21:29

## 2022-01-01 RX ADMIN — LEVOTHYROXINE SODIUM 112 MCG: 112 TABLET ORAL at 07:25

## 2022-01-01 RX ADMIN — SODIUM CHLORIDE TAB 1 GM 1 G: 1 TAB at 16:45

## 2022-01-01 RX ADMIN — MIDODRINE HYDROCHLORIDE 5 MG: 5 TABLET ORAL at 11:56

## 2022-01-01 RX ADMIN — SUCRALFATE 1 G: 1 TABLET ORAL at 11:30

## 2022-01-01 RX ADMIN — TRAZODONE HYDROCHLORIDE 150 MG: 50 TABLET ORAL at 21:02

## 2022-01-01 RX ADMIN — HEPARIN SODIUM 5000 UNITS: 5000 INJECTION INTRAVENOUS; SUBCUTANEOUS at 16:06

## 2022-01-01 RX ADMIN — HEPARIN SODIUM 5000 UNITS: 5000 INJECTION INTRAVENOUS; SUBCUTANEOUS at 22:31

## 2022-01-01 RX ADMIN — SUCRALFATE 1 G: 1 TABLET ORAL at 22:53

## 2022-01-01 RX ADMIN — SUCRALFATE 1 G: 1 TABLET ORAL at 17:46

## 2022-01-01 RX ADMIN — SENNOSIDES 17.2 MG: 8.6 TABLET, FILM COATED ORAL at 21:57

## 2022-01-01 RX ADMIN — TRAZODONE HYDROCHLORIDE 150 MG: 50 TABLET ORAL at 22:11

## 2022-01-01 RX ADMIN — FOLIC ACID 1 MG: 1 TABLET ORAL at 09:26

## 2022-01-01 RX ADMIN — OXYCODONE HYDROCHLORIDE 5 MG: 5 TABLET ORAL at 07:25

## 2022-01-01 RX ADMIN — HEPARIN SODIUM 5000 UNITS: 5000 INJECTION INTRAVENOUS; SUBCUTANEOUS at 21:53

## 2022-01-01 RX ADMIN — THIAMINE HCL TAB 100 MG 100 MG: 100 TAB at 08:44

## 2022-01-01 RX ADMIN — GUAIFENESIN 600 MG: 600 TABLET, EXTENDED RELEASE ORAL at 21:38

## 2022-01-01 RX ADMIN — OXYCODONE HYDROCHLORIDE 5 MG: 5 TABLET ORAL at 06:08

## 2022-01-01 RX ADMIN — FOLIC ACID 1 MG: 1 TABLET ORAL at 10:55

## 2022-01-01 RX ADMIN — CLONAZEPAM 3 MG: 1 TABLET ORAL at 22:47

## 2022-01-01 RX ADMIN — OXYCODONE HYDROCHLORIDE 5 MG: 5 TABLET ORAL at 10:22

## 2022-01-01 RX ADMIN — GUAIFENESIN 600 MG: 600 TABLET, EXTENDED RELEASE ORAL at 11:43

## 2022-01-01 RX ADMIN — HEPARIN SODIUM 5000 UNITS: 5000 INJECTION INTRAVENOUS; SUBCUTANEOUS at 06:30

## 2022-01-01 RX ADMIN — Medication 125 MG: at 23:16

## 2022-01-01 RX ADMIN — MIDODRINE HYDROCHLORIDE 5 MG: 5 TABLET ORAL at 17:55

## 2022-01-01 RX ADMIN — MIDODRINE HYDROCHLORIDE 5 MG: 5 TABLET ORAL at 16:33

## 2022-01-01 RX ADMIN — OXYCODONE HYDROCHLORIDE 5 MG: 5 TABLET ORAL at 17:35

## 2022-01-01 RX ADMIN — CLONAZEPAM 3 MG: 1 TABLET ORAL at 21:01

## 2022-01-01 RX ADMIN — GUAIFENESIN 600 MG: 600 TABLET, EXTENDED RELEASE ORAL at 08:12

## 2022-01-01 RX ADMIN — HEPARIN SODIUM 5000 UNITS: 5000 INJECTION INTRAVENOUS; SUBCUTANEOUS at 17:22

## 2022-01-01 RX ADMIN — OXYCODONE HYDROCHLORIDE 5 MG: 5 TABLET ORAL at 05:33

## 2022-01-01 RX ADMIN — HEPARIN SODIUM 5000 UNITS: 5000 INJECTION INTRAVENOUS; SUBCUTANEOUS at 10:16

## 2022-01-01 RX ADMIN — PANTOPRAZOLE SODIUM 40 MG: 40 TABLET, DELAYED RELEASE ORAL at 16:11

## 2022-01-01 RX ADMIN — SODIUM BICARBONATE 650 MG TABLET 650 MG: at 16:19

## 2022-01-01 RX ADMIN — GUAIFENESIN 600 MG: 600 TABLET, EXTENDED RELEASE ORAL at 22:04

## 2022-01-01 RX ADMIN — HEPARIN SODIUM 5000 UNITS: 5000 INJECTION INTRAVENOUS; SUBCUTANEOUS at 21:05

## 2022-01-01 RX ADMIN — MIDODRINE HYDROCHLORIDE 10 MG: 5 TABLET ORAL at 16:45

## 2022-01-01 RX ADMIN — OXYCODONE HYDROCHLORIDE 5 MG: 5 TABLET ORAL at 18:54

## 2022-01-01 RX ADMIN — TRAZODONE HYDROCHLORIDE 150 MG: 50 TABLET ORAL at 21:44

## 2022-01-01 RX ADMIN — SODIUM CHLORIDE TAB 1 GM 2 G: 1 TAB at 17:39

## 2022-01-01 RX ADMIN — OXYCODONE HYDROCHLORIDE 5 MG: 5 TABLET ORAL at 05:44

## 2022-01-01 RX ADMIN — PANTOPRAZOLE SODIUM 40 MG: 40 TABLET, DELAYED RELEASE ORAL at 15:59

## 2022-01-01 RX ADMIN — SODIUM CHLORIDE TAB 1 GM 2 G: 1 TAB at 21:38

## 2022-01-01 RX ADMIN — HEPARIN SODIUM 5000 UNITS: 5000 INJECTION INTRAVENOUS; SUBCUTANEOUS at 00:41

## 2022-01-01 RX ADMIN — THIAMINE HCL TAB 100 MG 100 MG: 100 TAB at 09:51

## 2022-01-01 RX ADMIN — THIAMINE HCL TAB 100 MG 100 MG: 100 TAB at 10:55

## 2022-01-01 RX ADMIN — Medication: at 21:27

## 2022-01-01 RX ADMIN — FOLIC ACID 1 MG: 1 TABLET ORAL at 08:46

## 2022-01-01 RX ADMIN — SUCRALFATE 1 G: 1 TABLET ORAL at 11:28

## 2022-01-01 RX ADMIN — OXYCODONE HYDROCHLORIDE 5 MG: 5 TABLET ORAL at 06:34

## 2022-01-01 RX ADMIN — HEPARIN SODIUM 5000 UNITS: 5000 INJECTION INTRAVENOUS; SUBCUTANEOUS at 05:28

## 2022-01-01 RX ADMIN — Medication 125 MG: at 16:56

## 2022-01-01 RX ADMIN — HEPARIN SODIUM 5000 UNITS: 5000 INJECTION INTRAVENOUS; SUBCUTANEOUS at 14:33

## 2022-01-01 RX ADMIN — PANTOPRAZOLE SODIUM 40 MG: 40 TABLET, DELAYED RELEASE ORAL at 06:11

## 2022-01-01 RX ADMIN — HEPARIN SODIUM 5000 UNITS: 5000 INJECTION INTRAVENOUS; SUBCUTANEOUS at 22:25

## 2022-01-01 RX ADMIN — PANTOPRAZOLE SODIUM 40 MG: 40 TABLET, DELAYED RELEASE ORAL at 07:25

## 2022-01-01 RX ADMIN — Medication 125 MG: at 05:26

## 2022-01-01 RX ADMIN — PANTOPRAZOLE SODIUM 40 MG: 40 TABLET, DELAYED RELEASE ORAL at 17:31

## 2022-01-01 RX ADMIN — PANTOPRAZOLE SODIUM 40 MG: 40 TABLET, DELAYED RELEASE ORAL at 05:59

## 2022-01-01 RX ADMIN — FOLIC ACID 1 MG: 1 TABLET ORAL at 11:12

## 2022-01-01 RX ADMIN — HEPARIN SODIUM 5000 UNITS: 5000 INJECTION INTRAVENOUS; SUBCUTANEOUS at 22:04

## 2022-01-01 RX ADMIN — HEPARIN SODIUM 5000 UNITS: 5000 INJECTION INTRAVENOUS; SUBCUTANEOUS at 14:07

## 2022-01-01 RX ADMIN — OXYCODONE HYDROCHLORIDE 5 MG: 5 TABLET ORAL at 11:20

## 2022-01-01 RX ADMIN — Medication 125 MG: at 21:30

## 2022-01-01 RX ADMIN — SENNOSIDES 17.2 MG: 8.6 TABLET, FILM COATED ORAL at 21:17

## 2022-01-01 RX ADMIN — TRAZODONE HYDROCHLORIDE 150 MG: 50 TABLET ORAL at 21:14

## 2022-01-01 RX ADMIN — MIDODRINE HYDROCHLORIDE 5 MG: 5 TABLET ORAL at 17:41

## 2022-01-01 RX ADMIN — FOLIC ACID 1 MG: 1 TABLET ORAL at 08:28

## 2022-01-01 RX ADMIN — Medication 125 MG: at 18:34

## 2022-01-01 RX ADMIN — LEVOTHYROXINE SODIUM 112 MCG: 112 TABLET ORAL at 17:50

## 2022-01-01 RX ADMIN — MIDODRINE HYDROCHLORIDE 5 MG: 5 TABLET ORAL at 05:59

## 2022-01-01 RX ADMIN — TRAZODONE HYDROCHLORIDE 150 MG: 50 TABLET ORAL at 21:49

## 2022-01-01 RX ADMIN — SODIUM CHLORIDE TAB 1 GM 2 G: 1 TAB at 11:43

## 2022-01-01 RX ADMIN — SODIUM CHLORIDE TAB 1 GM 2 G: 1 TAB at 22:42

## 2022-01-01 RX ADMIN — OXYCODONE HYDROCHLORIDE 5 MG: 5 TABLET ORAL at 17:08

## 2022-01-01 RX ADMIN — SENNOSIDES 17.2 MG: 8.6 TABLET, FILM COATED ORAL at 21:45

## 2022-01-01 RX ADMIN — PANTOPRAZOLE SODIUM 40 MG: 40 TABLET, DELAYED RELEASE ORAL at 11:00

## 2022-01-01 RX ADMIN — MIDODRINE HYDROCHLORIDE 5 MG: 5 TABLET ORAL at 18:29

## 2022-01-01 RX ADMIN — OXYCODONE HYDROCHLORIDE 5 MG: 5 TABLET ORAL at 11:41

## 2022-01-01 RX ADMIN — SUCRALFATE 1 G: 1 TABLET ORAL at 13:14

## 2022-01-01 RX ADMIN — OXYCODONE HYDROCHLORIDE 5 MG: 5 TABLET ORAL at 20:33

## 2022-01-01 RX ADMIN — CLONAZEPAM 3 MG: 1 TABLET ORAL at 21:25

## 2022-01-01 RX ADMIN — POLYETHYLENE GLYCOL 3350 17 G: 17 POWDER, FOR SOLUTION ORAL at 10:14

## 2022-01-01 RX ADMIN — TRAZODONE HYDROCHLORIDE 150 MG: 50 TABLET ORAL at 22:53

## 2022-01-01 RX ADMIN — FOLIC ACID 1 MG: 1 TABLET ORAL at 10:12

## 2022-01-01 RX ADMIN — FOLIC ACID 1 MG: 1 TABLET ORAL at 11:18

## 2022-01-01 RX ADMIN — SUCRALFATE 1 G: 1 TABLET ORAL at 06:19

## 2022-01-01 RX ADMIN — TRAZODONE HYDROCHLORIDE 150 MG: 50 TABLET ORAL at 21:00

## 2022-01-01 RX ADMIN — GUAIFENESIN 600 MG: 600 TABLET, EXTENDED RELEASE ORAL at 22:03

## 2022-01-01 RX ADMIN — LEVOTHYROXINE SODIUM 112 MCG: 112 TABLET ORAL at 14:08

## 2022-01-01 RX ADMIN — TRAZODONE HYDROCHLORIDE 150 MG: 50 TABLET ORAL at 21:19

## 2022-01-01 RX ADMIN — SODIUM CHLORIDE 75 ML/HR: 0.9 INJECTION, SOLUTION INTRAVENOUS at 08:32

## 2022-01-01 RX ADMIN — HEPARIN SODIUM 5000 UNITS: 5000 INJECTION INTRAVENOUS; SUBCUTANEOUS at 21:31

## 2022-01-01 RX ADMIN — SODIUM CHLORIDE TAB 1 GM 2 G: 1 TAB at 10:04

## 2022-01-01 RX ADMIN — HEPARIN SODIUM 5000 UNITS: 5000 INJECTION INTRAVENOUS; SUBCUTANEOUS at 15:30

## 2022-01-01 RX ADMIN — SUCRALFATE 1 G: 1 TABLET ORAL at 21:28

## 2022-01-01 RX ADMIN — OXYCODONE HYDROCHLORIDE 5 MG: 5 TABLET ORAL at 16:55

## 2022-01-01 RX ADMIN — OXYCODONE HYDROCHLORIDE 5 MG: 5 TABLET ORAL at 22:29

## 2022-01-01 RX ADMIN — SODIUM CHLORIDE TAB 1 GM 1 G: 1 TAB at 11:21

## 2022-01-01 RX ADMIN — MIDODRINE HYDROCHLORIDE 5 MG: 5 TABLET ORAL at 18:23

## 2022-01-01 RX ADMIN — PANTOPRAZOLE SODIUM 40 MG: 40 TABLET, DELAYED RELEASE ORAL at 17:38

## 2022-01-01 RX ADMIN — SENNOSIDES 17.2 MG: 8.6 TABLET, FILM COATED ORAL at 21:49

## 2022-01-01 RX ADMIN — PANTOPRAZOLE SODIUM 40 MG: 40 TABLET, DELAYED RELEASE ORAL at 06:06

## 2022-01-01 RX ADMIN — CLONAZEPAM 2 MG: 1 TABLET ORAL at 21:57

## 2022-01-01 RX ADMIN — GUAIFENESIN 600 MG: 600 TABLET, EXTENDED RELEASE ORAL at 21:00

## 2022-01-01 RX ADMIN — SODIUM CHLORIDE TAB 1 GM 2 G: 1 TAB at 10:56

## 2022-01-01 RX ADMIN — PANTOPRAZOLE SODIUM 40 MG: 40 TABLET, DELAYED RELEASE ORAL at 17:50

## 2022-01-01 RX ADMIN — Medication 125 MG: at 21:22

## 2022-01-01 RX ADMIN — MIDODRINE HYDROCHLORIDE 10 MG: 5 TABLET ORAL at 09:51

## 2022-01-01 RX ADMIN — ERTAPENEM SODIUM 500 MG: 1 INJECTION, POWDER, LYOPHILIZED, FOR SOLUTION INTRAMUSCULAR; INTRAVENOUS at 09:54

## 2022-01-01 RX ADMIN — POLYETHYLENE GLYCOL 3350 17 G: 17 POWDER, FOR SOLUTION ORAL at 22:06

## 2022-01-01 RX ADMIN — CLONAZEPAM 3 MG: 1 TABLET ORAL at 21:37

## 2022-01-01 RX ADMIN — Medication 125 MG: at 11:58

## 2022-01-01 RX ADMIN — PANTOPRAZOLE SODIUM 40 MG: 40 TABLET, DELAYED RELEASE ORAL at 10:14

## 2022-01-01 RX ADMIN — Medication 125 MG: at 05:59

## 2022-01-01 RX ADMIN — HEPARIN SODIUM 5000 UNITS: 5000 INJECTION INTRAVENOUS; SUBCUTANEOUS at 21:27

## 2022-01-01 RX ADMIN — ACETAMINOPHEN 650 MG: 325 TABLET, FILM COATED ORAL at 09:59

## 2022-01-01 RX ADMIN — MIDODRINE HYDROCHLORIDE 5 MG: 5 TABLET ORAL at 11:30

## 2022-01-01 RX ADMIN — MIDODRINE HYDROCHLORIDE 10 MG: 5 TABLET ORAL at 10:55

## 2022-01-01 RX ADMIN — HEPARIN SODIUM 5000 UNITS: 5000 INJECTION INTRAVENOUS; SUBCUTANEOUS at 05:57

## 2022-01-01 RX ADMIN — LEVOTHYROXINE SODIUM 112 MCG: 112 TABLET ORAL at 06:07

## 2022-01-01 RX ADMIN — FOLIC ACID 1 MG: 1 TABLET ORAL at 10:36

## 2022-01-01 RX ADMIN — FOLIC ACID 1 MG: 1 TABLET ORAL at 08:24

## 2022-01-01 RX ADMIN — PANTOPRAZOLE SODIUM 40 MG: 40 TABLET, DELAYED RELEASE ORAL at 05:32

## 2022-01-01 RX ADMIN — Medication 125 MG: at 12:40

## 2022-01-01 RX ADMIN — OXYCODONE HYDROCHLORIDE 5 MG: 5 TABLET ORAL at 04:21

## 2022-01-01 RX ADMIN — PANTOPRAZOLE SODIUM 40 MG: 40 INJECTION, POWDER, FOR SOLUTION INTRAVENOUS at 21:34

## 2022-01-01 RX ADMIN — OXYCODONE HYDROCHLORIDE 5 MG: 5 TABLET ORAL at 18:23

## 2022-01-01 RX ADMIN — HEPARIN SODIUM 5000 UNITS: 5000 INJECTION INTRAVENOUS; SUBCUTANEOUS at 16:53

## 2022-01-01 RX ADMIN — THIAMINE HCL TAB 100 MG 100 MG: 100 TAB at 09:46

## 2022-01-01 RX ADMIN — GUAIFENESIN 600 MG: 600 TABLET, EXTENDED RELEASE ORAL at 11:42

## 2022-01-01 RX ADMIN — PANTOPRAZOLE SODIUM 40 MG: 40 INJECTION, POWDER, FOR SOLUTION INTRAVENOUS at 08:25

## 2022-01-01 RX ADMIN — OXYCODONE HYDROCHLORIDE 5 MG: 5 TABLET ORAL at 17:39

## 2022-01-01 RX ADMIN — ACETAMINOPHEN 650 MG: 325 TABLET, FILM COATED ORAL at 09:39

## 2022-01-01 RX ADMIN — PANTOPRAZOLE SODIUM 40 MG: 40 TABLET, DELAYED RELEASE ORAL at 05:16

## 2022-01-01 RX ADMIN — SENNOSIDES 17.2 MG: 8.6 TABLET, FILM COATED ORAL at 21:40

## 2022-01-01 RX ADMIN — MIDODRINE HYDROCHLORIDE 10 MG: 5 TABLET ORAL at 16:09

## 2022-01-01 RX ADMIN — LEVOTHYROXINE SODIUM 125 MCG: 125 TABLET ORAL at 13:25

## 2022-01-01 RX ADMIN — SUCRALFATE 1 G: 1 TABLET ORAL at 12:15

## 2022-01-01 RX ADMIN — MIDODRINE HYDROCHLORIDE 5 MG: 5 TABLET ORAL at 11:46

## 2022-01-01 RX ADMIN — ACETAMINOPHEN 975 MG: 325 TABLET ORAL at 23:15

## 2022-01-01 RX ADMIN — THIAMINE HCL TAB 100 MG 100 MG: 100 TAB at 11:40

## 2022-01-01 RX ADMIN — LEVOTHYROXINE SODIUM 125 MCG: 125 TABLET ORAL at 14:45

## 2022-01-01 RX ADMIN — HEPARIN SODIUM 5000 UNITS: 5000 INJECTION INTRAVENOUS; SUBCUTANEOUS at 10:11

## 2022-01-01 RX ADMIN — GUAIFENESIN 600 MG: 600 TABLET, EXTENDED RELEASE ORAL at 00:30

## 2022-01-01 RX ADMIN — OXYCODONE HYDROCHLORIDE 5 MG: 5 TABLET ORAL at 10:05

## 2022-01-01 RX ADMIN — SODIUM CHLORIDE TAB 1 GM 2 G: 1 TAB at 22:12

## 2022-01-01 RX ADMIN — HEPARIN SODIUM 5000 UNITS: 5000 INJECTION INTRAVENOUS; SUBCUTANEOUS at 21:32

## 2022-01-01 RX ADMIN — HEPARIN SODIUM 5000 UNITS: 5000 INJECTION INTRAVENOUS; SUBCUTANEOUS at 06:11

## 2022-01-01 RX ADMIN — MIDODRINE HYDROCHLORIDE 5 MG: 5 TABLET ORAL at 17:30

## 2022-01-01 RX ADMIN — HEPARIN SODIUM 5000 UNITS: 5000 INJECTION INTRAVENOUS; SUBCUTANEOUS at 13:03

## 2022-01-01 RX ADMIN — MIDODRINE HYDROCHLORIDE 5 MG: 5 TABLET ORAL at 12:15

## 2022-01-01 RX ADMIN — MAGNESIUM HYDROXIDE 30 ML: 400 SUSPENSION ORAL at 14:21

## 2022-01-01 RX ADMIN — FOLIC ACID 1 MG: 1 TABLET ORAL at 09:15

## 2022-01-01 RX ADMIN — ACETAMINOPHEN 650 MG: 325 TABLET, FILM COATED ORAL at 11:28

## 2022-01-01 RX ADMIN — Medication 125 MG: at 21:48

## 2022-01-01 RX ADMIN — Medication 125 MG: at 12:08

## 2022-01-01 RX ADMIN — THIAMINE HCL TAB 100 MG 100 MG: 100 TAB at 10:10

## 2022-01-01 RX ADMIN — HEPARIN SODIUM 5000 UNITS: 5000 INJECTION INTRAVENOUS; SUBCUTANEOUS at 00:30

## 2022-01-01 RX ADMIN — LEVOTHYROXINE SODIUM 112 MCG: 112 TABLET ORAL at 06:21

## 2022-01-01 RX ADMIN — OXYCODONE HYDROCHLORIDE 5 MG: 5 TABLET ORAL at 22:00

## 2022-01-01 RX ADMIN — SUCRALFATE 1 G: 1 TABLET ORAL at 16:52

## 2022-01-01 RX ADMIN — ACETAMINOPHEN 975 MG: 325 TABLET ORAL at 01:53

## 2022-01-01 RX ADMIN — THIAMINE HCL TAB 100 MG 100 MG: 100 TAB at 09:47

## 2022-01-01 RX ADMIN — HEPARIN SODIUM 5000 UNITS: 5000 INJECTION INTRAVENOUS; SUBCUTANEOUS at 22:06

## 2022-01-01 RX ADMIN — ALBUMIN (HUMAN) 25 G: 0.25 INJECTION, SOLUTION INTRAVENOUS at 17:52

## 2022-01-01 RX ADMIN — PANTOPRAZOLE SODIUM 40 MG: 40 TABLET, DELAYED RELEASE ORAL at 07:39

## 2022-01-01 RX ADMIN — CEFEPIME HYDROCHLORIDE 1000 MG: 1 INJECTION, POWDER, FOR SOLUTION INTRAMUSCULAR; INTRAVENOUS at 02:55

## 2022-01-01 RX ADMIN — GUAIFENESIN 600 MG: 600 TABLET, EXTENDED RELEASE ORAL at 21:30

## 2022-01-01 RX ADMIN — PANTOPRAZOLE SODIUM 40 MG: 40 TABLET, DELAYED RELEASE ORAL at 07:16

## 2022-01-01 RX ADMIN — SENNOSIDES 17.2 MG: 8.6 TABLET, FILM COATED ORAL at 22:29

## 2022-01-01 RX ADMIN — SODIUM CHLORIDE TAB 1 GM 2 G: 1 TAB at 08:46

## 2022-01-01 RX ADMIN — PANTOPRAZOLE SODIUM 40 MG: 40 TABLET, DELAYED RELEASE ORAL at 06:04

## 2022-01-01 RX ADMIN — OXYCODONE HYDROCHLORIDE 5 MG: 5 TABLET ORAL at 14:02

## 2022-01-01 RX ADMIN — FOLIC ACID 1 MG: 1 TABLET ORAL at 10:58

## 2022-01-01 RX ADMIN — TRAZODONE HYDROCHLORIDE 150 MG: 50 TABLET ORAL at 21:15

## 2022-01-01 RX ADMIN — GUAIFENESIN 600 MG: 600 TABLET, EXTENDED RELEASE ORAL at 21:50

## 2022-01-01 RX ADMIN — TRAZODONE HYDROCHLORIDE 150 MG: 50 TABLET ORAL at 21:40

## 2022-01-01 RX ADMIN — TRAZODONE HYDROCHLORIDE 150 MG: 50 TABLET ORAL at 22:01

## 2022-01-01 RX ADMIN — FOLIC ACID 1 MG: 1 TABLET ORAL at 07:58

## 2022-01-01 RX ADMIN — GUAIFENESIN 600 MG: 600 TABLET, EXTENDED RELEASE ORAL at 08:29

## 2022-01-01 RX ADMIN — SODIUM CHLORIDE TAB 1 GM 2 G: 1 TAB at 22:13

## 2022-01-01 RX ADMIN — SENNOSIDES 17.2 MG: 8.6 TABLET, FILM COATED ORAL at 22:43

## 2022-01-01 RX ADMIN — HEPARIN SODIUM 5000 UNITS: 5000 INJECTION INTRAVENOUS; SUBCUTANEOUS at 13:12

## 2022-01-01 RX ADMIN — SODIUM CHLORIDE TAB 1 GM 2 G: 1 TAB at 11:04

## 2022-01-01 RX ADMIN — SUCRALFATE 1 G: 1 TABLET ORAL at 08:59

## 2022-01-01 RX ADMIN — SODIUM CHLORIDE, SODIUM GLUCONATE, SODIUM ACETATE, POTASSIUM CHLORIDE, MAGNESIUM CHLORIDE, SODIUM PHOSPHATE, DIBASIC, AND POTASSIUM PHOSPHATE 100 ML/HR: .53; .5; .37; .037; .03; .012; .00082 INJECTION, SOLUTION INTRAVENOUS at 06:36

## 2022-01-01 RX ADMIN — CLONAZEPAM 3 MG: 1 TABLET ORAL at 21:52

## 2022-01-01 RX ADMIN — MIDODRINE HYDROCHLORIDE 10 MG: 5 TABLET ORAL at 18:05

## 2022-01-01 RX ADMIN — SUCRALFATE 1 G: 1 TABLET ORAL at 05:59

## 2022-01-01 RX ADMIN — OXYCODONE HYDROCHLORIDE 5 MG: 5 TABLET ORAL at 06:11

## 2022-01-01 RX ADMIN — HEPARIN SODIUM 5000 UNITS: 5000 INJECTION INTRAVENOUS; SUBCUTANEOUS at 13:39

## 2022-01-01 RX ADMIN — SUCRALFATE 1 G: 1 TABLET ORAL at 08:35

## 2022-01-01 RX ADMIN — OXYCODONE HYDROCHLORIDE 5 MG: 5 TABLET ORAL at 10:09

## 2022-01-01 RX ADMIN — SODIUM CHLORIDE 100 ML/HR: 0.9 INJECTION, SOLUTION INTRAVENOUS at 12:37

## 2022-01-01 RX ADMIN — HEPARIN SODIUM 5000 UNITS: 5000 INJECTION INTRAVENOUS; SUBCUTANEOUS at 22:17

## 2022-01-01 RX ADMIN — MIDODRINE HYDROCHLORIDE 5 MG: 5 TABLET ORAL at 15:32

## 2022-01-01 RX ADMIN — MIDODRINE HYDROCHLORIDE 10 MG: 5 TABLET ORAL at 11:08

## 2022-01-01 RX ADMIN — SODIUM CHLORIDE 50 ML/HR: 0.9 INJECTION, SOLUTION INTRAVENOUS at 14:57

## 2022-01-01 RX ADMIN — Medication 125 MG: at 11:44

## 2022-01-01 RX ADMIN — HEPARIN SODIUM 5000 UNITS: 5000 INJECTION INTRAVENOUS; SUBCUTANEOUS at 05:55

## 2022-01-01 RX ADMIN — HEPARIN SODIUM 5000 UNITS: 5000 INJECTION INTRAVENOUS; SUBCUTANEOUS at 21:49

## 2022-01-01 RX ADMIN — VANCOMYCIN HYDROCHLORIDE 500 MG: 500 INJECTION, SOLUTION INTRAVENOUS at 09:54

## 2022-01-01 RX ADMIN — SODIUM CHLORIDE TAB 1 GM 2 G: 1 TAB at 10:42

## 2022-01-01 RX ADMIN — GUAIFENESIN 600 MG: 600 TABLET, EXTENDED RELEASE ORAL at 07:54

## 2022-01-01 RX ADMIN — IOHEXOL 96 ML: 350 INJECTION, SOLUTION INTRAVENOUS at 21:18

## 2022-01-01 RX ADMIN — SODIUM CHLORIDE TAB 1 GM 2 G: 1 TAB at 22:11

## 2022-01-01 RX ADMIN — SUCRALFATE 1 G: 1 TABLET ORAL at 21:51

## 2022-01-01 RX ADMIN — SODIUM CHLORIDE TAB 1 GM 2 G: 1 TAB at 18:04

## 2022-01-01 RX ADMIN — SODIUM CHLORIDE TAB 1 GM 2 G: 1 TAB at 21:47

## 2022-01-01 RX ADMIN — PANTOPRAZOLE SODIUM 40 MG: 40 INJECTION, POWDER, FOR SOLUTION INTRAVENOUS at 07:59

## 2022-01-01 RX ADMIN — Medication 125 MG: at 00:55

## 2022-01-01 RX ADMIN — PANTOPRAZOLE SODIUM 40 MG: 40 TABLET, DELAYED RELEASE ORAL at 17:01

## 2022-01-01 RX ADMIN — HEPARIN SODIUM 5000 UNITS: 5000 INJECTION INTRAVENOUS; SUBCUTANEOUS at 06:36

## 2022-01-01 RX ADMIN — PANTOPRAZOLE SODIUM 40 MG: 40 TABLET, DELAYED RELEASE ORAL at 16:55

## 2022-01-01 RX ADMIN — HEPARIN SODIUM 5000 UNITS: 5000 INJECTION INTRAVENOUS; SUBCUTANEOUS at 00:18

## 2022-01-01 RX ADMIN — LEVOTHYROXINE SODIUM 112 MCG: 112 TABLET ORAL at 05:44

## 2022-01-01 RX ADMIN — SODIUM CHLORIDE TAB 1 GM 2 G: 1 TAB at 17:17

## 2022-01-01 RX ADMIN — TRAZODONE HYDROCHLORIDE 150 MG: 50 TABLET ORAL at 22:17

## 2022-01-01 RX ADMIN — HEPARIN SODIUM 5000 UNITS: 5000 INJECTION INTRAVENOUS; SUBCUTANEOUS at 05:51

## 2022-01-01 RX ADMIN — OXYCODONE HYDROCHLORIDE 5 MG: 5 TABLET ORAL at 05:27

## 2022-01-01 RX ADMIN — Medication 125 MG: at 12:15

## 2022-01-01 RX ADMIN — MIDODRINE HYDROCHLORIDE 5 MG: 5 TABLET ORAL at 16:06

## 2022-01-01 RX ADMIN — SODIUM CHLORIDE TAB 1 GM 2 G: 1 TAB at 21:18

## 2022-01-01 RX ADMIN — SODIUM CHLORIDE TAB 1 GM 2 G: 1 TAB at 21:50

## 2022-01-01 RX ADMIN — ONDANSETRON 4 MG: 2 INJECTION INTRAMUSCULAR; INTRAVENOUS at 16:54

## 2022-01-01 RX ADMIN — SODIUM CHLORIDE TAB 1 GM 2 G: 1 TAB at 22:07

## 2022-01-01 RX ADMIN — POLYETHYLENE GLYCOL 3350 17 G: 17 POWDER, FOR SOLUTION ORAL at 08:40

## 2022-01-01 RX ADMIN — HEPARIN SODIUM 5000 UNITS: 5000 INJECTION INTRAVENOUS; SUBCUTANEOUS at 21:38

## 2022-01-01 RX ADMIN — MIDODRINE HYDROCHLORIDE 10 MG: 5 TABLET ORAL at 06:17

## 2022-01-01 RX ADMIN — MIDODRINE HYDROCHLORIDE 10 MG: 5 TABLET ORAL at 16:11

## 2022-01-01 RX ADMIN — SUCRALFATE 1 G: 1 TABLET ORAL at 17:45

## 2022-01-01 RX ADMIN — OXYCODONE HYDROCHLORIDE 2.5 MG: 5 TABLET ORAL at 18:30

## 2022-01-01 RX ADMIN — FOLIC ACID 1 MG: 1 TABLET ORAL at 10:29

## 2022-01-01 RX ADMIN — PANTOPRAZOLE SODIUM 40 MG: 40 INJECTION, POWDER, FOR SOLUTION INTRAVENOUS at 10:20

## 2022-01-01 RX ADMIN — VANCOMYCIN HYDROCHLORIDE 1250 MG: 10 INJECTION, POWDER, LYOPHILIZED, FOR SOLUTION INTRAVENOUS at 05:59

## 2022-01-01 RX ADMIN — OXYCODONE HYDROCHLORIDE 5 MG: 5 TABLET ORAL at 17:32

## 2022-01-01 RX ADMIN — SODIUM BICARBONATE 650 MG TABLET 650 MG: at 16:52

## 2022-01-01 RX ADMIN — TRAZODONE HYDROCHLORIDE 150 MG: 50 TABLET ORAL at 22:03

## 2022-01-01 RX ADMIN — Medication 125 MG: at 10:12

## 2022-01-01 RX ADMIN — THIAMINE HCL TAB 100 MG 100 MG: 100 TAB at 10:44

## 2022-01-01 RX ADMIN — LEVOTHYROXINE SODIUM 125 MCG: 125 TABLET ORAL at 05:37

## 2022-01-01 RX ADMIN — MIDODRINE HYDROCHLORIDE 10 MG: 5 TABLET ORAL at 08:03

## 2022-01-01 RX ADMIN — SODIUM BICARBONATE 650 MG TABLET 650 MG: at 17:41

## 2022-01-01 RX ADMIN — SUCRALFATE 1 G: 1 TABLET ORAL at 17:08

## 2022-01-01 RX ADMIN — FOLIC ACID 1 MG: 1 TABLET ORAL at 10:22

## 2022-01-01 RX ADMIN — SODIUM CHLORIDE TAB 1 GM 2 G: 1 TAB at 21:29

## 2022-01-01 RX ADMIN — OXYCODONE HYDROCHLORIDE 5 MG: 5 TABLET ORAL at 22:09

## 2022-01-01 RX ADMIN — OXYCODONE HYDROCHLORIDE 5 MG: 5 TABLET ORAL at 11:14

## 2022-01-01 RX ADMIN — MIDODRINE HYDROCHLORIDE 5 MG: 5 TABLET ORAL at 06:07

## 2022-01-01 RX ADMIN — OXYCODONE HYDROCHLORIDE 5 MG: 5 TABLET ORAL at 22:15

## 2022-01-01 RX ADMIN — Medication 125 MG: at 06:13

## 2022-01-01 RX ADMIN — HEPARIN SODIUM 5000 UNITS: 5000 INJECTION INTRAVENOUS; SUBCUTANEOUS at 06:45

## 2022-01-01 RX ADMIN — SODIUM BICARBONATE 650 MG TABLET 650 MG: at 18:17

## 2022-01-01 RX ADMIN — FOLIC ACID 1 MG: 1 TABLET ORAL at 09:53

## 2022-01-01 RX ADMIN — Medication 125 MG: at 05:07

## 2022-01-01 RX ADMIN — OXYCODONE HYDROCHLORIDE 5 MG: 5 TABLET ORAL at 06:09

## 2022-01-01 RX ADMIN — Medication 125 MG: at 16:30

## 2022-01-01 RX ADMIN — OXYCODONE HYDROCHLORIDE 5 MG: 5 TABLET ORAL at 17:31

## 2022-01-01 RX ADMIN — FOLIC ACID 1 MG: 1 TABLET ORAL at 09:19

## 2022-01-01 RX ADMIN — LEVOTHYROXINE SODIUM 112 MCG: 112 TABLET ORAL at 05:07

## 2022-01-01 RX ADMIN — MIDODRINE HYDROCHLORIDE 5 MG: 5 TABLET ORAL at 17:43

## 2022-01-01 RX ADMIN — FOLIC ACID 1 MG: 1 TABLET ORAL at 09:21

## 2022-01-01 RX ADMIN — SODIUM CHLORIDE TAB 1 GM 1 G: 1 TAB at 17:01

## 2022-01-01 RX ADMIN — MAGNESIUM HYDROXIDE 15 ML: 1200 LIQUID ORAL at 16:14

## 2022-01-01 RX ADMIN — GUAIFENESIN 600 MG: 600 TABLET, EXTENDED RELEASE ORAL at 21:31

## 2022-01-01 RX ADMIN — SODIUM BICARBONATE 650 MG TABLET 650 MG: at 18:04

## 2022-01-01 RX ADMIN — SODIUM BICARBONATE 650 MG TABLET 650 MG: at 16:55

## 2022-01-01 RX ADMIN — OXYCODONE HYDROCHLORIDE 5 MG: 5 TABLET ORAL at 13:26

## 2022-01-01 RX ADMIN — MIDODRINE HYDROCHLORIDE 10 MG: 5 TABLET ORAL at 12:01

## 2022-01-01 RX ADMIN — SODIUM CHLORIDE TAB 1 GM 2 G: 1 TAB at 16:11

## 2022-01-01 RX ADMIN — MIDODRINE HYDROCHLORIDE 10 MG: 5 TABLET ORAL at 05:45

## 2022-01-01 RX ADMIN — HEPARIN SODIUM 5000 UNITS: 5000 INJECTION INTRAVENOUS; SUBCUTANEOUS at 22:11

## 2022-01-01 RX ADMIN — PANTOPRAZOLE SODIUM 40 MG: 40 TABLET, DELAYED RELEASE ORAL at 06:46

## 2022-01-01 RX ADMIN — HEPARIN SODIUM 5000 UNITS: 5000 INJECTION INTRAVENOUS; SUBCUTANEOUS at 13:07

## 2022-01-01 RX ADMIN — POLYETHYLENE GLYCOL 3350 17 G: 17 POWDER, FOR SOLUTION ORAL at 21:32

## 2022-01-01 RX ADMIN — FOLIC ACID 1 MG: 1 TABLET ORAL at 11:04

## 2022-01-01 RX ADMIN — Medication 125 MG: at 17:00

## 2022-01-01 RX ADMIN — POLYETHYLENE GLYCOL 3350 17 G: 17 POWDER, FOR SOLUTION ORAL at 08:31

## 2022-01-01 RX ADMIN — HEPARIN SODIUM 5000 UNITS: 5000 INJECTION INTRAVENOUS; SUBCUTANEOUS at 15:07

## 2022-01-01 RX ADMIN — OXYCODONE HYDROCHLORIDE 5 MG: 5 TABLET ORAL at 13:13

## 2022-01-01 RX ADMIN — OXYCODONE HYDROCHLORIDE 5 MG: 5 TABLET ORAL at 17:54

## 2022-01-01 RX ADMIN — DAPTOMYCIN 325 MG: 500 INJECTION, POWDER, LYOPHILIZED, FOR SOLUTION INTRAVENOUS at 16:53

## 2022-01-01 RX ADMIN — HEPARIN SODIUM 5000 UNITS: 5000 INJECTION INTRAVENOUS; SUBCUTANEOUS at 05:32

## 2022-01-01 RX ADMIN — CLONAZEPAM 3 MG: 1 TABLET ORAL at 21:30

## 2022-01-01 RX ADMIN — SODIUM BICARBONATE 650 MG TABLET 650 MG: at 16:45

## 2022-01-01 RX ADMIN — OXYCODONE HYDROCHLORIDE 5 MG: 5 TABLET ORAL at 17:58

## 2022-01-01 RX ADMIN — LEVOTHYROXINE SODIUM 125 MCG: 125 TABLET ORAL at 13:42

## 2022-01-01 RX ADMIN — HEPARIN SODIUM 5000 UNITS: 5000 INJECTION INTRAVENOUS; SUBCUTANEOUS at 14:03

## 2022-01-01 RX ADMIN — SODIUM CHLORIDE TAB 1 GM 2 G: 1 TAB at 21:37

## 2022-01-01 RX ADMIN — SUCRALFATE 1 G: 1 TABLET ORAL at 21:14

## 2022-01-01 RX ADMIN — MIDODRINE HYDROCHLORIDE 10 MG: 5 TABLET ORAL at 16:55

## 2022-01-01 RX ADMIN — THIAMINE HCL TAB 100 MG 100 MG: 100 TAB at 09:50

## 2022-01-01 RX ADMIN — SODIUM BICARBONATE 650 MG TABLET 650 MG: at 07:55

## 2022-01-01 RX ADMIN — Medication 125 MG: at 08:31

## 2022-01-01 RX ADMIN — THIAMINE HCL TAB 100 MG 100 MG: 100 TAB at 08:25

## 2022-01-01 RX ADMIN — CLONAZEPAM 3 MG: 1 TABLET ORAL at 22:14

## 2022-01-01 RX ADMIN — THIAMINE HCL TAB 100 MG 100 MG: 100 TAB at 10:23

## 2022-01-01 RX ADMIN — POLYETHYLENE GLYCOL 3350 17 G: 17 POWDER, FOR SOLUTION ORAL at 21:59

## 2022-01-01 RX ADMIN — FOLIC ACID 1 MG: 1 TABLET ORAL at 09:41

## 2022-01-01 RX ADMIN — POLYETHYLENE GLYCOL 3350 17 G: 17 POWDER, FOR SOLUTION ORAL at 21:27

## 2022-01-01 RX ADMIN — MIDODRINE HYDROCHLORIDE 5 MG: 5 TABLET ORAL at 11:58

## 2022-01-01 RX ADMIN — MIDODRINE HYDROCHLORIDE 5 MG: 5 TABLET ORAL at 05:52

## 2022-01-01 RX ADMIN — FOLIC ACID 1 MG: 1 TABLET ORAL at 09:51

## 2022-01-01 RX ADMIN — THIAMINE HCL TAB 100 MG 100 MG: 100 TAB at 09:41

## 2022-01-01 RX ADMIN — GUAIFENESIN 600 MG: 600 TABLET, EXTENDED RELEASE ORAL at 09:47

## 2022-01-01 RX ADMIN — SUCRALFATE 1 G: 1 TABLET ORAL at 06:37

## 2022-01-01 RX ADMIN — TRAZODONE HYDROCHLORIDE 150 MG: 50 TABLET ORAL at 21:30

## 2022-01-01 RX ADMIN — THIAMINE HCL TAB 100 MG 100 MG: 100 TAB at 12:39

## 2022-01-01 RX ADMIN — SUCRALFATE 1 G: 1 TABLET ORAL at 21:08

## 2022-01-01 RX ADMIN — HEPARIN SODIUM 5000 UNITS: 5000 INJECTION INTRAVENOUS; SUBCUTANEOUS at 14:37

## 2022-01-01 RX ADMIN — POLYETHYLENE GLYCOL 3350 17 G: 17 POWDER, FOR SOLUTION ORAL at 10:58

## 2022-01-01 RX ADMIN — HEPARIN SODIUM 5000 UNITS: 5000 INJECTION INTRAVENOUS; SUBCUTANEOUS at 06:06

## 2022-01-01 RX ADMIN — SODIUM CHLORIDE TAB 1 GM 2 G: 1 TAB at 09:57

## 2022-01-01 RX ADMIN — HEPARIN SODIUM 5000 UNITS: 5000 INJECTION INTRAVENOUS; SUBCUTANEOUS at 06:10

## 2022-01-01 RX ADMIN — MIDODRINE HYDROCHLORIDE 5 MG: 5 TABLET ORAL at 05:31

## 2022-01-01 RX ADMIN — LEVOTHYROXINE SODIUM 125 MCG: 125 TABLET ORAL at 10:14

## 2022-01-01 RX ADMIN — OXYCODONE HYDROCHLORIDE 5 MG: 5 TABLET ORAL at 06:20

## 2022-01-01 RX ADMIN — PANTOPRAZOLE SODIUM 40 MG: 40 TABLET, DELAYED RELEASE ORAL at 18:29

## 2022-01-01 RX ADMIN — Medication 125 MG: at 11:40

## 2022-01-01 RX ADMIN — OXYCODONE HYDROCHLORIDE 5 MG: 5 TABLET ORAL at 16:09

## 2022-01-01 RX ADMIN — SUCRALFATE 1 G: 1 TABLET ORAL at 16:06

## 2022-01-01 RX ADMIN — SODIUM CHLORIDE TAB 1 GM 1 G: 1 TAB at 13:47

## 2022-01-01 RX ADMIN — SODIUM CHLORIDE TAB 1 GM 2 G: 1 TAB at 21:59

## 2022-01-01 RX ADMIN — VANCOMYCIN HYDROCHLORIDE 1000 MG: 1 INJECTION, SOLUTION INTRAVENOUS at 05:21

## 2022-01-01 RX ADMIN — MIDODRINE HYDROCHLORIDE 10 MG: 5 TABLET ORAL at 16:39

## 2022-01-01 RX ADMIN — MIDODRINE HYDROCHLORIDE 10 MG: 5 TABLET ORAL at 15:59

## 2022-01-01 RX ADMIN — SODIUM CHLORIDE TAB 1 GM 1 G: 1 TAB at 09:30

## 2022-01-01 RX ADMIN — HEPARIN SODIUM 5000 UNITS: 5000 INJECTION INTRAVENOUS; SUBCUTANEOUS at 06:44

## 2022-01-01 RX ADMIN — MIDODRINE HYDROCHLORIDE 5 MG: 5 TABLET ORAL at 08:35

## 2022-01-01 RX ADMIN — GUAIFENESIN 600 MG: 600 TABLET, EXTENDED RELEASE ORAL at 21:18

## 2022-01-01 RX ADMIN — HEPARIN SODIUM 5000 UNITS: 5000 INJECTION INTRAVENOUS; SUBCUTANEOUS at 04:53

## 2022-01-01 RX ADMIN — PANTOPRAZOLE SODIUM 40 MG: 40 TABLET, DELAYED RELEASE ORAL at 17:45

## 2022-01-01 RX ADMIN — Medication 125 MG: at 11:28

## 2022-01-01 RX ADMIN — THIAMINE HCL TAB 100 MG 100 MG: 100 TAB at 11:19

## 2022-01-01 RX ADMIN — SODIUM POLYSTYRENE SULFONATE 15 G: 4.1 POWDER, FOR SUSPENSION ORAL; RECTAL at 22:05

## 2022-01-01 RX ADMIN — THIAMINE HCL TAB 100 MG 100 MG: 100 TAB at 08:59

## 2022-01-01 RX ADMIN — OXYCODONE HYDROCHLORIDE 5 MG: 5 TABLET ORAL at 11:51

## 2022-01-01 RX ADMIN — CLONAZEPAM 3 MG: 1 TABLET ORAL at 22:17

## 2022-01-01 RX ADMIN — HEPARIN SODIUM 5000 UNITS: 5000 INJECTION INTRAVENOUS; SUBCUTANEOUS at 21:17

## 2022-01-01 RX ADMIN — SODIUM CHLORIDE TAB 1 GM 2 G: 1 TAB at 09:52

## 2022-01-01 RX ADMIN — OXYCODONE HYDROCHLORIDE 5 MG: 5 TABLET ORAL at 05:50

## 2022-01-01 RX ADMIN — MIDODRINE HYDROCHLORIDE 5 MG: 5 TABLET ORAL at 11:18

## 2022-01-01 RX ADMIN — ACETAMINOPHEN 650 MG: 325 TABLET, FILM COATED ORAL at 06:40

## 2022-01-01 RX ADMIN — OXYCODONE HYDROCHLORIDE 5 MG: 5 TABLET ORAL at 06:30

## 2022-01-01 RX ADMIN — GUAIFENESIN 600 MG: 600 TABLET, EXTENDED RELEASE ORAL at 20:02

## 2022-01-01 RX ADMIN — THIAMINE HCL TAB 100 MG 100 MG: 100 TAB at 13:26

## 2022-01-01 RX ADMIN — SENNOSIDES 17.2 MG: 8.6 TABLET, FILM COATED ORAL at 22:06

## 2022-01-01 RX ADMIN — SUCRALFATE 1 G: 1 TABLET ORAL at 06:28

## 2022-01-01 RX ADMIN — FOLIC ACID 1 MG: 1 TABLET ORAL at 10:10

## 2022-01-01 RX ADMIN — LEVOTHYROXINE SODIUM 125 MCG: 125 TABLET ORAL at 13:26

## 2022-01-01 RX ADMIN — SUCRALFATE 1 G: 1 TABLET ORAL at 12:39

## 2022-01-01 RX ADMIN — HEPARIN SODIUM 5000 UNITS: 5000 INJECTION INTRAVENOUS; SUBCUTANEOUS at 14:00

## 2022-01-01 RX ADMIN — PANTOPRAZOLE SODIUM 40 MG: 40 TABLET, DELAYED RELEASE ORAL at 06:20

## 2022-01-01 RX ADMIN — PANTOPRAZOLE SODIUM 40 MG: 40 TABLET, DELAYED RELEASE ORAL at 18:07

## 2022-01-01 RX ADMIN — CLONAZEPAM 3 MG: 1 TABLET ORAL at 21:19

## 2022-01-01 RX ADMIN — SODIUM CHLORIDE 100 ML/HR: 0.9 INJECTION, SOLUTION INTRAVENOUS at 17:04

## 2022-01-01 RX ADMIN — MIDODRINE HYDROCHLORIDE 10 MG: 5 TABLET ORAL at 17:39

## 2022-01-01 RX ADMIN — SUCRALFATE 1 G: 1 TABLET ORAL at 22:31

## 2022-01-01 RX ADMIN — SODIUM CHLORIDE TAB 1 GM 2 G: 1 TAB at 17:34

## 2022-01-01 RX ADMIN — MIDODRINE HYDROCHLORIDE 5 MG: 5 TABLET ORAL at 17:08

## 2022-01-01 RX ADMIN — ONDANSETRON 4 MG: 2 INJECTION INTRAMUSCULAR; INTRAVENOUS at 13:39

## 2022-01-01 RX ADMIN — OXYCODONE HYDROCHLORIDE 5 MG: 5 TABLET ORAL at 10:33

## 2022-01-01 RX ADMIN — SUCRALFATE 1 G: 1 TABLET ORAL at 17:41

## 2022-01-01 RX ADMIN — THIAMINE HCL TAB 100 MG 100 MG: 100 TAB at 10:29

## 2022-01-01 RX ADMIN — GUAIFENESIN 600 MG: 600 TABLET, EXTENDED RELEASE ORAL at 08:59

## 2022-01-01 RX ADMIN — HEPARIN SODIUM 5000 UNITS: 5000 INJECTION INTRAVENOUS; SUBCUTANEOUS at 13:30

## 2022-01-01 RX ADMIN — PANTOPRAZOLE SODIUM 40 MG: 40 TABLET, DELAYED RELEASE ORAL at 16:51

## 2022-01-01 RX ADMIN — FOLIC ACID 1 MG: 1 TABLET ORAL at 11:06

## 2022-01-01 RX ADMIN — MIDODRINE HYDROCHLORIDE 10 MG: 5 TABLET ORAL at 13:30

## 2022-01-01 RX ADMIN — PANTOPRAZOLE SODIUM 40 MG: 40 TABLET, DELAYED RELEASE ORAL at 18:03

## 2022-01-01 RX ADMIN — SENNOSIDES 17.2 MG: 8.6 TABLET, FILM COATED ORAL at 22:39

## 2022-01-01 RX ADMIN — OXYCODONE HYDROCHLORIDE 5 MG: 5 TABLET ORAL at 23:14

## 2022-01-01 RX ADMIN — HEPARIN SODIUM 5000 UNITS: 5000 INJECTION INTRAVENOUS; SUBCUTANEOUS at 22:43

## 2022-01-01 RX ADMIN — MIDODRINE HYDROCHLORIDE 5 MG: 5 TABLET ORAL at 10:22

## 2022-01-01 RX ADMIN — HEPARIN SODIUM 5000 UNITS: 5000 INJECTION INTRAVENOUS; SUBCUTANEOUS at 05:34

## 2022-01-01 RX ADMIN — PANTOPRAZOLE SODIUM 40 MG: 40 TABLET, DELAYED RELEASE ORAL at 17:08

## 2022-01-01 RX ADMIN — Medication 125 MG: at 06:29

## 2022-01-01 RX ADMIN — MIDODRINE HYDROCHLORIDE 10 MG: 5 TABLET ORAL at 06:20

## 2022-01-01 RX ADMIN — FOLIC ACID 1 MG: 1 TABLET ORAL at 10:08

## 2022-01-01 RX ADMIN — ERTAPENEM SODIUM 500 MG: 1 INJECTION, POWDER, LYOPHILIZED, FOR SOLUTION INTRAMUSCULAR; INTRAVENOUS at 10:45

## 2022-01-01 RX ADMIN — FOLIC ACID 1 MG: 1 TABLET ORAL at 08:25

## 2022-01-01 RX ADMIN — THIAMINE HCL TAB 100 MG 100 MG: 100 TAB at 11:03

## 2022-01-01 RX ADMIN — POLYETHYLENE GLYCOL 3350 17 G: 17 POWDER, FOR SOLUTION ORAL at 12:05

## 2022-01-01 RX ADMIN — SODIUM CHLORIDE TAB 1 GM 1 G: 1 TAB at 08:45

## 2022-01-01 RX ADMIN — SUCRALFATE 1 G: 1 TABLET ORAL at 08:46

## 2022-01-01 RX ADMIN — SUCRALFATE 1 G: 1 TABLET ORAL at 22:01

## 2022-01-01 RX ADMIN — POLYETHYLENE GLYCOL 3350 17 G: 17 POWDER, FOR SOLUTION ORAL at 11:42

## 2022-01-01 RX ADMIN — HEPARIN SODIUM 5000 UNITS: 5000 INJECTION INTRAVENOUS; SUBCUTANEOUS at 16:58

## 2022-01-01 RX ADMIN — SODIUM CHLORIDE TAB 1 GM 2 G: 1 TAB at 21:28

## 2022-01-01 RX ADMIN — SUCRALFATE 1 G: 1 TABLET ORAL at 06:23

## 2022-01-01 RX ADMIN — MIDODRINE HYDROCHLORIDE 10 MG: 5 TABLET ORAL at 06:44

## 2022-01-01 RX ADMIN — FOLIC ACID 1 MG: 1 TABLET ORAL at 10:50

## 2022-01-01 RX ADMIN — Medication 125 MG: at 05:56

## 2022-01-01 RX ADMIN — HEPARIN SODIUM 5000 UNITS: 5000 INJECTION INTRAVENOUS; SUBCUTANEOUS at 09:41

## 2022-01-01 RX ADMIN — HEPARIN SODIUM 5000 UNITS: 5000 INJECTION INTRAVENOUS; SUBCUTANEOUS at 16:40

## 2022-01-01 RX ADMIN — THIAMINE HCL TAB 100 MG 100 MG: 100 TAB at 10:58

## 2022-01-01 RX ADMIN — OXYCODONE HYDROCHLORIDE 5 MG: 5 TABLET ORAL at 06:07

## 2022-01-01 RX ADMIN — CLONAZEPAM 3 MG: 1 TABLET ORAL at 21:23

## 2022-01-01 RX ADMIN — OXYCODONE HYDROCHLORIDE 5 MG: 5 TABLET ORAL at 10:54

## 2022-01-01 RX ADMIN — OXYCODONE HYDROCHLORIDE 5 MG: 5 TABLET ORAL at 18:16

## 2022-01-01 RX ADMIN — MIDODRINE HYDROCHLORIDE 5 MG: 5 TABLET ORAL at 16:51

## 2022-01-01 RX ADMIN — ACETAMINOPHEN 650 MG: 325 TABLET, FILM COATED ORAL at 09:19

## 2022-01-01 RX ADMIN — TRAZODONE HYDROCHLORIDE 150 MG: 50 TABLET ORAL at 21:59

## 2022-01-01 RX ADMIN — FOLIC ACID 1 MG: 1 TABLET ORAL at 09:29

## 2022-01-01 RX ADMIN — GUAIFENESIN 600 MG: 600 TABLET, EXTENDED RELEASE ORAL at 09:46

## 2022-01-01 RX ADMIN — CLONAZEPAM 3 MG: 1 TABLET ORAL at 21:03

## 2022-01-01 RX ADMIN — LEVOTHYROXINE SODIUM 125 MCG: 125 TABLET ORAL at 13:59

## 2022-01-01 RX ADMIN — OXYCODONE HYDROCHLORIDE 5 MG: 5 TABLET ORAL at 04:45

## 2022-01-01 RX ADMIN — HEPARIN SODIUM 5000 UNITS: 5000 INJECTION INTRAVENOUS; SUBCUTANEOUS at 13:49

## 2022-01-01 RX ADMIN — SODIUM CHLORIDE TAB 1 GM 2 G: 1 TAB at 18:18

## 2022-01-01 RX ADMIN — MIDODRINE HYDROCHLORIDE 5 MG: 5 TABLET ORAL at 06:23

## 2022-01-01 RX ADMIN — SUCRALFATE 1 G: 1 TABLET ORAL at 21:33

## 2022-01-01 RX ADMIN — THIAMINE HCL TAB 100 MG 100 MG: 100 TAB at 09:19

## 2022-01-01 RX ADMIN — Medication 500 MG: at 17:02

## 2022-01-01 RX ADMIN — SUCRALFATE 1 G: 1 TABLET ORAL at 11:41

## 2022-01-01 RX ADMIN — Medication 125 MG: at 17:02

## 2022-01-01 RX ADMIN — GUAIFENESIN 600 MG: 600 TABLET, EXTENDED RELEASE ORAL at 21:40

## 2022-01-01 RX ADMIN — CLONAZEPAM 3 MG: 1 TABLET ORAL at 21:08

## 2022-01-01 RX ADMIN — CLONAZEPAM 3 MG: 1 TABLET ORAL at 22:08

## 2022-01-01 RX ADMIN — LEVOTHYROXINE SODIUM 112 MCG: 112 TABLET ORAL at 05:16

## 2022-01-01 RX ADMIN — LEVOTHYROXINE SODIUM 112 MCG: 112 TABLET ORAL at 06:37

## 2022-01-01 RX ADMIN — GUAIFENESIN 600 MG: 600 TABLET, EXTENDED RELEASE ORAL at 22:29

## 2022-01-01 RX ADMIN — MIDODRINE HYDROCHLORIDE 10 MG: 5 TABLET ORAL at 12:58

## 2022-01-01 RX ADMIN — MIDODRINE HYDROCHLORIDE 5 MG: 5 TABLET ORAL at 12:13

## 2022-01-01 RX ADMIN — SUCRALFATE 1 G: 1 TABLET ORAL at 16:30

## 2022-01-01 RX ADMIN — SUCRALFATE 1 G: 1 TABLET ORAL at 11:56

## 2022-01-01 RX ADMIN — SODIUM CHLORIDE TAB 1 GM 1 G: 1 TAB at 10:30

## 2022-01-01 RX ADMIN — HEPARIN SODIUM 5000 UNITS: 5000 INJECTION INTRAVENOUS; SUBCUTANEOUS at 15:17

## 2022-01-01 RX ADMIN — VANCOMYCIN HYDROCHLORIDE 1000 MG: 1 INJECTION, SOLUTION INTRAVENOUS at 05:04

## 2022-01-01 RX ADMIN — MIDODRINE HYDROCHLORIDE 10 MG: 5 TABLET ORAL at 10:15

## 2022-01-01 RX ADMIN — SODIUM BICARBONATE 50 MEQ: 84 INJECTION, SOLUTION INTRAVENOUS at 18:25

## 2022-01-01 RX ADMIN — MIDODRINE HYDROCHLORIDE 5 MG: 5 TABLET ORAL at 12:19

## 2022-01-01 RX ADMIN — Medication 125 MG: at 00:37

## 2022-01-01 RX ADMIN — THIAMINE HCL TAB 100 MG 100 MG: 100 TAB at 07:53

## 2022-01-01 RX ADMIN — Medication 125 MG: at 17:50

## 2022-01-01 RX ADMIN — POLYETHYLENE GLYCOL 3350 17 G: 17 POWDER, FOR SOLUTION ORAL at 11:05

## 2022-01-01 RX ADMIN — PANTOPRAZOLE SODIUM 40 MG: 40 TABLET, DELAYED RELEASE ORAL at 17:05

## 2022-01-01 RX ADMIN — HEPARIN SODIUM 5000 UNITS: 5000 INJECTION INTRAVENOUS; SUBCUTANEOUS at 21:30

## 2022-01-01 RX ADMIN — SODIUM BICARBONATE 650 MG TABLET 650 MG: at 10:14

## 2022-01-01 RX ADMIN — LEVOTHYROXINE SODIUM 125 MCG: 125 TABLET ORAL at 13:07

## 2022-01-01 RX ADMIN — GUAIFENESIN 600 MG: 600 TABLET, EXTENDED RELEASE ORAL at 21:15

## 2022-01-01 RX ADMIN — TRAZODONE HYDROCHLORIDE 150 MG: 50 TABLET ORAL at 21:28

## 2022-01-01 RX ADMIN — MIDODRINE HYDROCHLORIDE 10 MG: 5 TABLET ORAL at 05:52

## 2022-01-01 RX ADMIN — FOLIC ACID 1 MG: 1 TABLET ORAL at 08:03

## 2022-01-01 RX ADMIN — GUAIFENESIN 600 MG: 600 TABLET, EXTENDED RELEASE ORAL at 08:46

## 2022-01-01 RX ADMIN — MIDODRINE HYDROCHLORIDE 10 MG: 5 TABLET ORAL at 17:22

## 2022-01-01 RX ADMIN — CLONAZEPAM 3 MG: 1 TABLET ORAL at 22:18

## 2022-01-01 RX ADMIN — OXYCODONE HYDROCHLORIDE 5 MG: 5 TABLET ORAL at 06:12

## 2022-01-01 RX ADMIN — OXYCODONE HYDROCHLORIDE 5 MG: 5 TABLET ORAL at 17:49

## 2022-01-01 RX ADMIN — LEVOTHYROXINE SODIUM 112 MCG: 112 TABLET ORAL at 06:11

## 2022-01-01 RX ADMIN — GUAIFENESIN 600 MG: 600 TABLET, EXTENDED RELEASE ORAL at 10:14

## 2022-01-01 RX ADMIN — Medication 125 MG: at 00:19

## 2022-01-01 RX ADMIN — FOLIC ACID 1 MG: 1 TABLET ORAL at 11:41

## 2022-01-01 RX ADMIN — SODIUM CHLORIDE TAB 1 GM 2 G: 1 TAB at 11:10

## 2022-01-01 RX ADMIN — Medication 125 MG: at 10:50

## 2022-01-01 RX ADMIN — OXYCODONE HYDROCHLORIDE 5 MG: 5 TABLET ORAL at 10:53

## 2022-01-01 RX ADMIN — SODIUM CHLORIDE 500 ML: 0.9 INJECTION, SOLUTION INTRAVENOUS at 16:44

## 2022-01-01 RX ADMIN — OXYCODONE HYDROCHLORIDE 5 MG: 5 TABLET ORAL at 12:15

## 2022-01-01 RX ADMIN — POLYETHYLENE GLYCOL 3350 17 G: 17 POWDER, FOR SOLUTION ORAL at 21:14

## 2022-01-01 RX ADMIN — MIDODRINE HYDROCHLORIDE 10 MG: 5 TABLET ORAL at 11:11

## 2022-01-01 RX ADMIN — HEPARIN SODIUM 5000 UNITS: 5000 INJECTION INTRAVENOUS; SUBCUTANEOUS at 22:01

## 2022-01-01 RX ADMIN — OXYCODONE HYDROCHLORIDE 5 MG: 5 TABLET ORAL at 17:23

## 2022-01-01 RX ADMIN — MIDODRINE HYDROCHLORIDE 5 MG: 5 TABLET ORAL at 11:28

## 2022-01-01 RX ADMIN — SODIUM CHLORIDE TAB 1 GM 1 G: 1 TAB at 17:33

## 2022-01-01 RX ADMIN — LEVOTHYROXINE SODIUM 112 MCG: 112 TABLET ORAL at 09:21

## 2022-01-01 RX ADMIN — POLYETHYLENE GLYCOL 3350 17 G: 17 POWDER, FOR SOLUTION ORAL at 09:26

## 2022-01-01 RX ADMIN — MIDODRINE HYDROCHLORIDE 5 MG: 5 TABLET ORAL at 12:08

## 2022-01-01 RX ADMIN — SODIUM CHLORIDE TAB 1 GM 2 G: 1 TAB at 10:10

## 2022-01-01 RX ADMIN — OXYCODONE HYDROCHLORIDE 5 MG: 5 TABLET ORAL at 12:08

## 2022-01-01 RX ADMIN — SUCRALFATE 1 G: 1 TABLET ORAL at 12:19

## 2022-01-01 RX ADMIN — POLYETHYLENE GLYCOL 3350 17 G: 17 POWDER, FOR SOLUTION ORAL at 21:31

## 2022-01-01 RX ADMIN — PANTOPRAZOLE SODIUM 40 MG: 40 TABLET, DELAYED RELEASE ORAL at 05:55

## 2022-01-01 RX ADMIN — Medication 125 MG: at 18:18

## 2022-01-01 RX ADMIN — PANTOPRAZOLE SODIUM 40 MG: 40 TABLET, DELAYED RELEASE ORAL at 05:45

## 2022-01-01 RX ADMIN — PANTOPRAZOLE SODIUM 40 MG: 40 TABLET, DELAYED RELEASE ORAL at 17:58

## 2022-01-01 RX ADMIN — LEVOTHYROXINE SODIUM 112 MCG: 112 TABLET ORAL at 13:50

## 2022-01-01 RX ADMIN — MIDODRINE HYDROCHLORIDE 10 MG: 5 TABLET ORAL at 17:50

## 2022-01-01 RX ADMIN — MIDODRINE HYDROCHLORIDE 5 MG: 5 TABLET ORAL at 05:27

## 2022-01-01 RX ADMIN — MIDODRINE HYDROCHLORIDE 10 MG: 5 TABLET ORAL at 16:12

## 2022-01-01 RX ADMIN — HEPARIN SODIUM 5000 UNITS: 5000 INJECTION INTRAVENOUS; SUBCUTANEOUS at 08:40

## 2022-01-01 RX ADMIN — FOLIC ACID 1 MG: 1 TABLET ORAL at 10:34

## 2022-01-01 RX ADMIN — OXYCODONE HYDROCHLORIDE 5 MG: 5 TABLET ORAL at 22:31

## 2022-01-01 RX ADMIN — OXYCODONE HYDROCHLORIDE 2.5 MG: 5 TABLET ORAL at 06:50

## 2022-01-01 RX ADMIN — MIDODRINE HYDROCHLORIDE 5 MG: 5 TABLET ORAL at 17:50

## 2022-01-01 RX ADMIN — TRAZODONE HYDROCHLORIDE 150 MG: 50 TABLET ORAL at 21:57

## 2022-01-01 RX ADMIN — OXYCODONE HYDROCHLORIDE 5 MG: 5 TABLET ORAL at 22:39

## 2022-01-01 RX ADMIN — LEVOTHYROXINE SODIUM 125 MCG: 125 TABLET ORAL at 07:16

## 2022-01-01 RX ADMIN — PANTOPRAZOLE SODIUM 40 MG: 40 TABLET, DELAYED RELEASE ORAL at 16:39

## 2022-01-01 RX ADMIN — THIAMINE HCL TAB 100 MG 100 MG: 100 TAB at 10:33

## 2022-01-01 RX ADMIN — FOLIC ACID 1 MG: 1 TABLET ORAL at 11:20

## 2022-01-01 RX ADMIN — ACETAMINOPHEN 650 MG: 325 TABLET, FILM COATED ORAL at 21:24

## 2022-01-01 RX ADMIN — THIAMINE HCL TAB 100 MG 100 MG: 100 TAB at 09:21

## 2022-01-01 RX ADMIN — GUAIFENESIN 600 MG: 600 TABLET, EXTENDED RELEASE ORAL at 11:11

## 2022-01-01 RX ADMIN — PANTOPRAZOLE SODIUM 40 MG: 40 TABLET, DELAYED RELEASE ORAL at 10:55

## 2022-01-01 RX ADMIN — HEPARIN SODIUM 5000 UNITS: 5000 INJECTION INTRAVENOUS; SUBCUTANEOUS at 13:59

## 2022-01-01 RX ADMIN — SENNOSIDES 17.2 MG: 8.6 TABLET, FILM COATED ORAL at 21:13

## 2022-01-01 RX ADMIN — Medication 125 MG: at 11:22

## 2022-01-01 RX ADMIN — Medication 125 MG: at 01:22

## 2022-01-01 RX ADMIN — FOLIC ACID 1 MG: 1 TABLET ORAL at 09:34

## 2022-01-01 RX ADMIN — Medication 125 MG: at 16:47

## 2022-01-01 RX ADMIN — SUCRALFATE 1 G: 1 TABLET ORAL at 06:07

## 2022-01-01 RX ADMIN — VANCOMYCIN HYDROCHLORIDE 500 MG: 500 INJECTION, SOLUTION INTRAVENOUS at 11:37

## 2022-01-01 RX ADMIN — PANTOPRAZOLE SODIUM 40 MG: 40 TABLET, DELAYED RELEASE ORAL at 06:26

## 2022-01-01 RX ADMIN — MIDODRINE HYDROCHLORIDE 10 MG: 5 TABLET ORAL at 05:44

## 2022-01-01 RX ADMIN — MIDODRINE HYDROCHLORIDE 5 MG: 5 TABLET ORAL at 18:11

## 2022-01-01 RX ADMIN — OXYCODONE HYDROCHLORIDE 5 MG: 5 TABLET ORAL at 05:34

## 2022-01-01 RX ADMIN — CLONAZEPAM 3 MG: 1 TABLET ORAL at 23:26

## 2022-01-01 RX ADMIN — OXYCODONE HYDROCHLORIDE 5 MG: 5 TABLET ORAL at 00:30

## 2022-01-01 RX ADMIN — CLONAZEPAM 3 MG: 1 TABLET ORAL at 21:29

## 2022-01-01 RX ADMIN — FOLIC ACID 1 MG: 1 TABLET ORAL at 11:30

## 2022-01-01 RX ADMIN — SUCRALFATE 1 G: 1 TABLET ORAL at 15:32

## 2022-01-01 RX ADMIN — ALBUMIN (HUMAN) 25 G: 0.25 INJECTION, SOLUTION INTRAVENOUS at 12:19

## 2022-01-01 RX ADMIN — CLONAZEPAM 3 MG: 1 TABLET ORAL at 22:02

## 2022-01-01 RX ADMIN — SUCRALFATE 1 G: 1 TABLET ORAL at 12:22

## 2022-01-01 RX ADMIN — MIDODRINE HYDROCHLORIDE 5 MG: 5 TABLET ORAL at 11:14

## 2022-01-01 RX ADMIN — GUAIFENESIN 600 MG: 600 TABLET, EXTENDED RELEASE ORAL at 22:09

## 2022-01-01 RX ADMIN — SODIUM CHLORIDE 500 ML: 0.9 INJECTION, SOLUTION INTRAVENOUS at 18:28

## 2022-01-01 RX ADMIN — HEPARIN SODIUM 5000 UNITS: 5000 INJECTION INTRAVENOUS; SUBCUTANEOUS at 21:23

## 2022-01-01 RX ADMIN — OXYCODONE HYDROCHLORIDE 5 MG: 5 TABLET ORAL at 13:50

## 2022-01-01 RX ADMIN — PANTOPRAZOLE SODIUM 40 MG: 40 TABLET, DELAYED RELEASE ORAL at 06:21

## 2022-01-01 RX ADMIN — MIDODRINE HYDROCHLORIDE 10 MG: 5 TABLET ORAL at 17:37

## 2022-01-01 RX ADMIN — OXYCODONE HYDROCHLORIDE 5 MG: 5 TABLET ORAL at 16:12

## 2022-01-01 RX ADMIN — SUCRALFATE 1 G: 1 TABLET ORAL at 13:21

## 2022-01-01 RX ADMIN — MIDODRINE HYDROCHLORIDE 5 MG: 5 TABLET ORAL at 12:22

## 2022-01-01 RX ADMIN — CLONAZEPAM 3 MG: 1 TABLET ORAL at 22:57

## 2022-01-01 RX ADMIN — HEPARIN SODIUM 5000 UNITS: 5000 INJECTION INTRAVENOUS; SUBCUTANEOUS at 14:38

## 2022-01-01 RX ADMIN — SUCRALFATE 1 G: 1 TABLET ORAL at 16:08

## 2022-01-01 RX ADMIN — Medication 125 MG: at 17:47

## 2022-01-01 RX ADMIN — TRAZODONE HYDROCHLORIDE 150 MG: 50 TABLET ORAL at 21:29

## 2022-01-01 RX ADMIN — Medication 125 MG: at 11:32

## 2022-01-01 RX ADMIN — SODIUM CHLORIDE TAB 1 GM 2 G: 1 TAB at 20:03

## 2022-01-01 RX ADMIN — MIDODRINE HYDROCHLORIDE 5 MG: 5 TABLET ORAL at 06:29

## 2022-01-01 RX ADMIN — THIAMINE HCL TAB 100 MG 100 MG: 100 TAB at 07:59

## 2022-01-01 RX ADMIN — THIAMINE HCL TAB 100 MG 100 MG: 100 TAB at 10:12

## 2022-01-01 RX ADMIN — OXYCODONE HYDROCHLORIDE 5 MG: 5 TABLET ORAL at 23:10

## 2022-01-01 RX ADMIN — SODIUM CHLORIDE 75 ML/HR: 0.9 INJECTION, SOLUTION INTRAVENOUS at 11:14

## 2022-01-01 RX ADMIN — SENNOSIDES 17.2 MG: 8.6 TABLET, FILM COATED ORAL at 21:00

## 2022-01-01 RX ADMIN — MIDODRINE HYDROCHLORIDE 10 MG: 5 TABLET ORAL at 11:03

## 2022-01-01 RX ADMIN — OXYCODONE HYDROCHLORIDE 5 MG: 5 TABLET ORAL at 23:45

## 2022-01-01 RX ADMIN — HEPARIN SODIUM 5000 UNITS: 5000 INJECTION INTRAVENOUS; SUBCUTANEOUS at 06:19

## 2022-01-01 RX ADMIN — THIAMINE HCL TAB 100 MG 100 MG: 100 TAB at 08:35

## 2022-01-01 RX ADMIN — OXYCODONE HYDROCHLORIDE 5 MG: 5 TABLET ORAL at 02:02

## 2022-01-01 RX ADMIN — PANTOPRAZOLE SODIUM 40 MG: 40 INJECTION, POWDER, FOR SOLUTION INTRAVENOUS at 21:33

## 2022-01-01 RX ADMIN — Medication 125 MG: at 11:51

## 2022-01-01 RX ADMIN — LEVOTHYROXINE SODIUM 112 MCG: 112 TABLET ORAL at 05:52

## 2022-01-01 RX ADMIN — HEPARIN SODIUM 5000 UNITS: 5000 INJECTION INTRAVENOUS; SUBCUTANEOUS at 06:26

## 2022-01-01 RX ADMIN — OXYCODONE HYDROCHLORIDE 2.5 MG: 5 TABLET ORAL at 17:42

## 2022-01-01 RX ADMIN — SENNOSIDES 17.2 MG: 8.6 TABLET, FILM COATED ORAL at 21:27

## 2022-01-01 RX ADMIN — SODIUM CHLORIDE TAB 1 GM 2 G: 1 TAB at 20:24

## 2022-01-01 RX ADMIN — MIDODRINE HYDROCHLORIDE 10 MG: 5 TABLET ORAL at 12:28

## 2022-01-01 RX ADMIN — SODIUM BICARBONATE 650 MG TABLET 650 MG: at 17:05

## 2022-01-01 RX ADMIN — THIAMINE HCL TAB 100 MG 100 MG: 100 TAB at 11:00

## 2022-01-01 RX ADMIN — HEPARIN SODIUM 5000 UNITS: 5000 INJECTION INTRAVENOUS; SUBCUTANEOUS at 06:07

## 2022-01-01 RX ADMIN — TRAZODONE HYDROCHLORIDE 150 MG: 50 TABLET ORAL at 21:45

## 2022-01-01 RX ADMIN — MIDODRINE HYDROCHLORIDE 10 MG: 5 TABLET ORAL at 13:26

## 2022-01-01 RX ADMIN — POLYETHYLENE GLYCOL 3350 17 G: 17 POWDER, FOR SOLUTION ORAL at 09:50

## 2022-01-01 RX ADMIN — POLYETHYLENE GLYCOL 3350 17 G: 17 POWDER, FOR SOLUTION ORAL at 11:04

## 2022-01-01 RX ADMIN — CLONAZEPAM 3 MG: 1 TABLET ORAL at 23:59

## 2022-01-01 RX ADMIN — SODIUM CHLORIDE TAB 1 GM 2 G: 1 TAB at 17:41

## 2022-01-01 RX ADMIN — FOLIC ACID 1 MG: 1 TABLET ORAL at 10:14

## 2022-01-01 RX ADMIN — GUAIFENESIN 600 MG: 600 TABLET, EXTENDED RELEASE ORAL at 09:29

## 2022-01-01 RX ADMIN — LEVOTHYROXINE SODIUM 112 MCG: 112 TABLET ORAL at 17:56

## 2022-01-01 RX ADMIN — FOLIC ACID 1 MG: 1 TABLET ORAL at 10:44

## 2022-01-01 RX ADMIN — SODIUM CHLORIDE TAB 1 GM 2 G: 1 TAB at 21:27

## 2022-01-01 RX ADMIN — THIAMINE HCL TAB 100 MG 100 MG: 100 TAB at 10:14

## 2022-01-01 RX ADMIN — GUAIFENESIN 600 MG: 600 TABLET, EXTENDED RELEASE ORAL at 10:36

## 2022-01-01 RX ADMIN — THIAMINE HCL TAB 100 MG 100 MG: 100 TAB at 11:01

## 2022-01-01 RX ADMIN — MIDODRINE HYDROCHLORIDE 10 MG: 5 TABLET ORAL at 07:39

## 2022-01-01 RX ADMIN — FOLIC ACID 1 MG: 1 TABLET ORAL at 10:04

## 2022-01-01 RX ADMIN — SODIUM CHLORIDE TAB 1 GM 2 G: 1 TAB at 20:37

## 2022-01-01 RX ADMIN — PANTOPRAZOLE SODIUM 40 MG: 40 TABLET, DELAYED RELEASE ORAL at 05:44

## 2022-01-01 RX ADMIN — THIAMINE HCL TAB 100 MG 100 MG: 100 TAB at 08:40

## 2022-01-01 RX ADMIN — Medication 125 MG: at 06:41

## 2022-01-01 RX ADMIN — OXYCODONE HYDROCHLORIDE 5 MG: 5 TABLET ORAL at 22:04

## 2022-01-01 RX ADMIN — MIDODRINE HYDROCHLORIDE 5 MG: 5 TABLET ORAL at 13:04

## 2022-01-01 RX ADMIN — THIAMINE HCL TAB 100 MG 100 MG: 100 TAB at 07:54

## 2022-01-01 RX ADMIN — Medication 125 MG: at 05:49

## 2022-01-01 RX ADMIN — GUAIFENESIN 600 MG: 600 TABLET, EXTENDED RELEASE ORAL at 21:19

## 2022-01-01 RX ADMIN — FOLIC ACID 1 MG: 1 TABLET ORAL at 09:16

## 2022-01-01 RX ADMIN — SODIUM CHLORIDE TAB 1 GM 1 G: 1 TAB at 21:39

## 2022-01-01 RX ADMIN — Medication 125 MG: at 17:32

## 2022-01-01 RX ADMIN — Medication 125 MG: at 12:05

## 2022-01-01 RX ADMIN — OXYCODONE HYDROCHLORIDE 2.5 MG: 5 TABLET ORAL at 13:04

## 2022-01-01 RX ADMIN — SENNOSIDES 17.2 MG: 8.6 TABLET, FILM COATED ORAL at 22:15

## 2022-01-01 RX ADMIN — SODIUM BICARBONATE 650 MG TABLET 650 MG: at 11:00

## 2022-01-01 RX ADMIN — SUCRALFATE 1 G: 1 TABLET ORAL at 22:32

## 2022-01-01 RX ADMIN — OXYCODONE HYDROCHLORIDE 5 MG: 5 TABLET ORAL at 06:04

## 2022-01-01 RX ADMIN — MIDODRINE HYDROCHLORIDE 5 MG: 5 TABLET ORAL at 06:21

## 2022-01-01 RX ADMIN — SUCRALFATE 1 G: 1 TABLET ORAL at 13:04

## 2022-01-01 RX ADMIN — MIDODRINE HYDROCHLORIDE 10 MG: 5 TABLET ORAL at 18:02

## 2022-01-01 RX ADMIN — LEVOTHYROXINE SODIUM 125 MCG: 125 TABLET ORAL at 13:40

## 2022-01-01 RX ADMIN — PANTOPRAZOLE SODIUM 40 MG: 40 TABLET, DELAYED RELEASE ORAL at 10:09

## 2022-01-01 RX ADMIN — Medication 125 MG: at 06:45

## 2022-01-01 RX ADMIN — HEPARIN SODIUM 5000 UNITS: 5000 INJECTION INTRAVENOUS; SUBCUTANEOUS at 05:43

## 2022-01-01 RX ADMIN — OXYCODONE HYDROCHLORIDE 5 MG: 5 TABLET ORAL at 17:34

## 2022-01-01 RX ADMIN — SUCRALFATE 1 G: 1 TABLET ORAL at 05:27

## 2022-01-01 RX ADMIN — SENNOSIDES 17.2 MG: 8.6 TABLET, FILM COATED ORAL at 23:10

## 2022-01-01 RX ADMIN — POLYETHYLENE GLYCOL 3350 17 G: 17 POWDER, FOR SOLUTION ORAL at 21:45

## 2022-01-01 RX ADMIN — MIDODRINE HYDROCHLORIDE 10 MG: 5 TABLET ORAL at 11:42

## 2022-01-01 RX ADMIN — ERTAPENEM SODIUM 500 MG: 1 INJECTION, POWDER, LYOPHILIZED, FOR SOLUTION INTRAMUSCULAR; INTRAVENOUS at 08:58

## 2022-01-01 RX ADMIN — LEVOTHYROXINE SODIUM 125 MCG: 125 TABLET ORAL at 16:53

## 2022-01-01 RX ADMIN — Medication 125 MG: at 10:24

## 2022-01-01 RX ADMIN — MIDODRINE HYDROCHLORIDE 5 MG: 5 TABLET ORAL at 16:56

## 2022-01-01 RX ADMIN — OXYCODONE HYDROCHLORIDE 5 MG: 5 TABLET ORAL at 14:00

## 2022-01-01 RX ADMIN — GUAIFENESIN 600 MG: 600 TABLET, EXTENDED RELEASE ORAL at 21:27

## 2022-01-01 RX ADMIN — GUAIFENESIN 600 MG: 600 TABLET, EXTENDED RELEASE ORAL at 22:06

## 2022-01-01 RX ADMIN — HEPARIN SODIUM 5000 UNITS: 5000 INJECTION INTRAVENOUS; SUBCUTANEOUS at 14:17

## 2022-01-01 RX ADMIN — MIDODRINE HYDROCHLORIDE 5 MG: 5 TABLET ORAL at 07:58

## 2022-01-01 RX ADMIN — PANTOPRAZOLE SODIUM 40 MG: 40 TABLET, DELAYED RELEASE ORAL at 05:26

## 2022-01-01 RX ADMIN — OXYCODONE HYDROCHLORIDE 5 MG: 5 TABLET ORAL at 16:57

## 2022-01-01 RX ADMIN — SODIUM CHLORIDE TAB 1 GM 1 G: 1 TAB at 10:12

## 2022-01-01 RX ADMIN — OXYCODONE HYDROCHLORIDE 5 MG: 5 TABLET ORAL at 10:58

## 2022-01-01 RX ADMIN — HEPARIN SODIUM 5000 UNITS: 5000 INJECTION INTRAVENOUS; SUBCUTANEOUS at 16:09

## 2022-01-01 RX ADMIN — HEPARIN SODIUM 5000 UNITS: 5000 INJECTION INTRAVENOUS; SUBCUTANEOUS at 21:28

## 2022-01-01 RX ADMIN — SENNOSIDES 17.2 MG: 8.6 TABLET, FILM COATED ORAL at 21:20

## 2022-01-01 RX ADMIN — ACETAMINOPHEN 650 MG: 325 TABLET, FILM COATED ORAL at 05:58

## 2022-01-01 RX ADMIN — FOLIC ACID 1 MG: 1 TABLET ORAL at 10:20

## 2022-01-01 RX ADMIN — MIDODRINE HYDROCHLORIDE 5 MG: 5 TABLET ORAL at 06:12

## 2022-01-01 RX ADMIN — TRAZODONE HYDROCHLORIDE 150 MG: 50 TABLET ORAL at 23:09

## 2022-01-01 RX ADMIN — MIDODRINE HYDROCHLORIDE 10 MG: 5 TABLET ORAL at 16:54

## 2022-01-01 RX ADMIN — MIDODRINE HYDROCHLORIDE 5 MG: 5 TABLET ORAL at 11:51

## 2022-01-01 RX ADMIN — OXYCODONE HYDROCHLORIDE 5 MG: 5 TABLET ORAL at 17:37

## 2022-01-01 RX ADMIN — MIDODRINE HYDROCHLORIDE 5 MG: 5 TABLET ORAL at 16:42

## 2022-01-01 RX ADMIN — SUCRALFATE 1 G: 1 TABLET ORAL at 16:51

## 2022-01-01 RX ADMIN — SODIUM CHLORIDE TAB 1 GM 2 G: 1 TAB at 21:15

## 2022-01-01 RX ADMIN — THIAMINE HCL TAB 100 MG 100 MG: 100 TAB at 08:37

## 2022-01-01 RX ADMIN — MIDODRINE HYDROCHLORIDE 10 MG: 5 TABLET ORAL at 10:09

## 2022-01-01 RX ADMIN — FOLIC ACID 1 MG: 1 TABLET ORAL at 09:46

## 2022-01-01 RX ADMIN — HEPARIN SODIUM 5000 UNITS: 5000 INJECTION INTRAVENOUS; SUBCUTANEOUS at 13:24

## 2022-01-01 RX ADMIN — PANTOPRAZOLE SODIUM 40 MG: 40 TABLET, DELAYED RELEASE ORAL at 08:35

## 2022-01-01 RX ADMIN — SUCRALFATE 1 G: 1 TABLET ORAL at 16:47

## 2022-01-01 RX ADMIN — GUAIFENESIN 600 MG: 600 TABLET, EXTENDED RELEASE ORAL at 21:02

## 2022-01-01 RX ADMIN — SODIUM BICARBONATE 650 MG TABLET 650 MG: at 10:04

## 2022-01-01 RX ADMIN — GUAIFENESIN 600 MG: 600 TABLET, EXTENDED RELEASE ORAL at 21:20

## 2022-01-01 RX ADMIN — DEXTROSE AND SODIUM CHLORIDE 50 ML/HR: 5; .9 INJECTION, SOLUTION INTRAVENOUS at 12:19

## 2022-01-01 RX ADMIN — MIDODRINE HYDROCHLORIDE 5 MG: 5 TABLET ORAL at 05:16

## 2022-01-01 RX ADMIN — IOHEXOL 80 ML: 350 INJECTION, SOLUTION INTRAVENOUS at 04:49

## 2022-01-01 RX ADMIN — MIDODRINE HYDROCHLORIDE 10 MG: 5 TABLET ORAL at 10:46

## 2022-01-01 RX ADMIN — SODIUM CHLORIDE TAB 1 GM 2 G: 1 TAB at 16:54

## 2022-01-01 RX ADMIN — CLONAZEPAM 3 MG: 1 TABLET ORAL at 21:16

## 2022-01-01 RX ADMIN — HEPARIN SODIUM 5000 UNITS: 5000 INJECTION INTRAVENOUS; SUBCUTANEOUS at 14:55

## 2022-01-01 RX ADMIN — SUCRALFATE 1 G: 1 TABLET ORAL at 06:45

## 2022-01-01 RX ADMIN — SODIUM BICARBONATE 650 MG TABLET 650 MG: at 17:54

## 2022-01-01 RX ADMIN — THIAMINE HCL TAB 100 MG 100 MG: 100 TAB at 08:31

## 2022-01-01 RX ADMIN — MIDODRINE HYDROCHLORIDE 10 MG: 5 TABLET ORAL at 10:51

## 2022-01-01 RX ADMIN — OXYCODONE HYDROCHLORIDE 5 MG: 5 TABLET ORAL at 18:01

## 2022-01-01 RX ADMIN — GUAIFENESIN 600 MG: 600 TABLET, EXTENDED RELEASE ORAL at 21:16

## 2022-01-01 RX ADMIN — SODIUM BICARBONATE 650 MG TABLET 650 MG: at 10:56

## 2022-01-01 RX ADMIN — MIDODRINE HYDROCHLORIDE 10 MG: 5 TABLET ORAL at 06:50

## 2022-01-01 RX ADMIN — SODIUM CHLORIDE TAB 1 GM 2 G: 1 TAB at 16:55

## 2022-01-01 RX ADMIN — FOLIC ACID 1 MG: 1 TABLET ORAL at 11:14

## 2022-01-01 RX ADMIN — Medication 125 MG: at 23:02

## 2022-01-01 RX ADMIN — OXYCODONE HYDROCHLORIDE 5 MG: 5 TABLET ORAL at 05:48

## 2022-01-01 RX ADMIN — SODIUM CHLORIDE TAB 1 GM 2 G: 1 TAB at 10:33

## 2022-01-01 RX ADMIN — HEPARIN SODIUM 5000 UNITS: 5000 INJECTION INTRAVENOUS; SUBCUTANEOUS at 05:52

## 2022-01-01 RX ADMIN — OXYCODONE HYDROCHLORIDE 5 MG: 5 TABLET ORAL at 23:20

## 2022-01-01 RX ADMIN — PANTOPRAZOLE SODIUM 40 MG: 40 TABLET, DELAYED RELEASE ORAL at 07:55

## 2022-01-01 RX ADMIN — TRAZODONE HYDROCHLORIDE 150 MG: 50 TABLET ORAL at 22:06

## 2022-01-01 RX ADMIN — VANCOMYCIN HYDROCHLORIDE 1250 MG: 10 INJECTION, POWDER, LYOPHILIZED, FOR SOLUTION INTRAVENOUS at 05:31

## 2022-01-01 RX ADMIN — MIDODRINE HYDROCHLORIDE 10 MG: 5 TABLET ORAL at 12:19

## 2022-01-01 RX ADMIN — CLONAZEPAM 3 MG: 1 TABLET ORAL at 23:20

## 2022-01-01 RX ADMIN — SODIUM CHLORIDE TAB 1 GM 2 G: 1 TAB at 09:27

## 2022-01-01 RX ADMIN — MIDODRINE HYDROCHLORIDE 5 MG: 5 TABLET ORAL at 10:44

## 2022-01-01 RX ADMIN — SODIUM CHLORIDE TAB 1 GM 2 G: 1 TAB at 00:38

## 2022-01-01 RX ADMIN — PANTOPRAZOLE SODIUM 40 MG: 40 INJECTION, POWDER, FOR SOLUTION INTRAVENOUS at 08:44

## 2022-01-01 RX ADMIN — GUAIFENESIN 600 MG: 600 TABLET, EXTENDED RELEASE ORAL at 10:10

## 2022-01-01 RX ADMIN — OXYCODONE HYDROCHLORIDE 5 MG: 5 TABLET ORAL at 00:43

## 2022-01-01 RX ADMIN — MIDODRINE HYDROCHLORIDE 10 MG: 5 TABLET ORAL at 12:37

## 2022-01-01 RX ADMIN — POLYETHYLENE GLYCOL 3350 17 G: 17 POWDER, FOR SOLUTION ORAL at 08:03

## 2022-01-01 RX ADMIN — THIAMINE HCL TAB 100 MG 100 MG: 100 TAB at 10:50

## 2022-01-01 RX ADMIN — MIDODRINE HYDROCHLORIDE 5 MG: 5 TABLET ORAL at 12:03

## 2022-01-01 RX ADMIN — OXYCODONE HYDROCHLORIDE 5 MG: 5 TABLET ORAL at 18:02

## 2022-01-01 RX ADMIN — OXYCODONE HYDROCHLORIDE 5 MG: 5 TABLET ORAL at 22:10

## 2022-01-01 RX ADMIN — CLONAZEPAM 3 MG: 1 TABLET ORAL at 21:31

## 2022-01-01 RX ADMIN — SODIUM CHLORIDE TAB 1 GM 2 G: 1 TAB at 17:22

## 2022-01-01 RX ADMIN — ALPRAZOLAM 0.25 MG: 0.25 TABLET ORAL at 17:11

## 2022-01-01 RX ADMIN — HEPARIN SODIUM 5000 UNITS: 5000 INJECTION INTRAVENOUS; SUBCUTANEOUS at 05:16

## 2022-01-01 RX ADMIN — HEPARIN SODIUM 5000 UNITS: 5000 INJECTION INTRAVENOUS; SUBCUTANEOUS at 21:37

## 2022-01-01 RX ADMIN — SUCRALFATE 1 G: 1 TABLET ORAL at 17:39

## 2022-01-01 RX ADMIN — MIDODRINE HYDROCHLORIDE 10 MG: 5 TABLET ORAL at 12:45

## 2022-01-01 RX ADMIN — SODIUM CHLORIDE TAB 1 GM 2 G: 1 TAB at 16:52

## 2022-01-01 RX ADMIN — ACETAMINOPHEN 650 MG: 325 TABLET, FILM COATED ORAL at 10:28

## 2022-01-01 RX ADMIN — TRAZODONE HYDROCHLORIDE 150 MG: 50 TABLET ORAL at 21:51

## 2022-01-01 RX ADMIN — GUAIFENESIN 600 MG: 600 TABLET, EXTENDED RELEASE ORAL at 10:44

## 2022-01-01 RX ADMIN — SUCRALFATE 1 G: 1 TABLET ORAL at 06:21

## 2022-01-01 RX ADMIN — MIDODRINE HYDROCHLORIDE 5 MG: 5 TABLET ORAL at 12:38

## 2022-01-01 RX ADMIN — OXYCODONE HYDROCHLORIDE 5 MG: 5 TABLET ORAL at 16:11

## 2022-01-01 RX ADMIN — SODIUM CHLORIDE TAB 1 GM 2 G: 1 TAB at 21:40

## 2022-01-01 RX ADMIN — POLYETHYLENE GLYCOL 3350 17 G: 17 POWDER, FOR SOLUTION ORAL at 21:19

## 2022-01-01 RX ADMIN — OXYCODONE HYDROCHLORIDE 2.5 MG: 5 TABLET ORAL at 06:30

## 2022-01-01 RX ADMIN — SUCRALFATE 1 G: 1 TABLET ORAL at 11:46

## 2022-01-01 RX ADMIN — SENNOSIDES 17.2 MG: 8.6 TABLET, FILM COATED ORAL at 21:59

## 2022-01-01 RX ADMIN — MIDODRINE HYDROCHLORIDE 5 MG: 5 TABLET ORAL at 10:54

## 2022-01-01 RX ADMIN — SODIUM CHLORIDE 75 ML/HR: 0.9 INJECTION, SOLUTION INTRAVENOUS at 19:00

## 2022-01-01 RX ADMIN — TRAZODONE HYDROCHLORIDE 150 MG: 50 TABLET ORAL at 22:31

## 2022-01-01 RX ADMIN — Medication 125 MG: at 11:48

## 2022-01-01 RX ADMIN — MIDODRINE HYDROCHLORIDE 10 MG: 5 TABLET ORAL at 12:08

## 2022-01-01 RX ADMIN — MIDODRINE HYDROCHLORIDE 10 MG: 5 TABLET ORAL at 17:23

## 2022-01-01 RX ADMIN — TRAZODONE HYDROCHLORIDE 150 MG: 50 TABLET ORAL at 23:14

## 2022-01-01 RX ADMIN — OXYCODONE HYDROCHLORIDE 5 MG: 5 TABLET ORAL at 10:15

## 2022-01-01 RX ADMIN — HEPARIN SODIUM 5000 UNITS: 5000 INJECTION INTRAVENOUS; SUBCUTANEOUS at 21:52

## 2022-01-01 RX ADMIN — ACETAMINOPHEN 650 MG: 325 TABLET, FILM COATED ORAL at 23:14

## 2022-01-01 RX ADMIN — LEVOTHYROXINE SODIUM 125 MCG: 125 TABLET ORAL at 14:39

## 2022-01-01 RX ADMIN — PANTOPRAZOLE SODIUM 40 MG: 40 TABLET, DELAYED RELEASE ORAL at 08:59

## 2022-01-01 RX ADMIN — HEPARIN SODIUM 5000 UNITS: 5000 INJECTION INTRAVENOUS; SUBCUTANEOUS at 13:26

## 2022-01-01 RX ADMIN — LEVOTHYROXINE SODIUM 112 MCG: 112 TABLET ORAL at 05:29

## 2022-01-01 RX ADMIN — POLYETHYLENE GLYCOL 3350 17 G: 17 POWDER, FOR SOLUTION ORAL at 20:02

## 2022-01-01 RX ADMIN — TRAZODONE HYDROCHLORIDE 150 MG: 50 TABLET ORAL at 21:33

## 2022-01-01 RX ADMIN — GUAIFENESIN 600 MG: 600 TABLET, EXTENDED RELEASE ORAL at 08:40

## 2022-01-01 RX ADMIN — Medication 125 MG: at 23:01

## 2022-01-01 RX ADMIN — PANTOPRAZOLE SODIUM 40 MG: 40 INJECTION, POWDER, FOR SOLUTION INTRAVENOUS at 23:14

## 2022-01-01 RX ADMIN — OXYCODONE HYDROCHLORIDE 5 MG: 5 TABLET ORAL at 12:37

## 2022-01-01 RX ADMIN — MIDODRINE HYDROCHLORIDE 10 MG: 5 TABLET ORAL at 16:52

## 2022-01-01 RX ADMIN — OXYCODONE HYDROCHLORIDE 5 MG: 5 TABLET ORAL at 16:51

## 2022-01-01 RX ADMIN — LEVOTHYROXINE SODIUM 112 MCG: 112 TABLET ORAL at 13:12

## 2022-01-01 RX ADMIN — TRAZODONE HYDROCHLORIDE 150 MG: 50 TABLET ORAL at 00:30

## 2022-01-01 RX ADMIN — PANTOPRAZOLE SODIUM 40 MG: 40 TABLET, DELAYED RELEASE ORAL at 07:54

## 2022-01-01 RX ADMIN — TRAZODONE HYDROCHLORIDE 150 MG: 50 TABLET ORAL at 21:22

## 2022-01-01 RX ADMIN — TRAZODONE HYDROCHLORIDE 150 MG: 50 TABLET ORAL at 21:04

## 2022-01-01 RX ADMIN — GUAIFENESIN 600 MG: 600 TABLET, EXTENDED RELEASE ORAL at 21:59

## 2022-01-01 RX ADMIN — VANCOMYCIN HYDROCHLORIDE 1250 MG: 10 INJECTION, POWDER, LYOPHILIZED, FOR SOLUTION INTRAVENOUS at 08:44

## 2022-01-01 RX ADMIN — THIAMINE HCL TAB 100 MG 100 MG: 100 TAB at 11:30

## 2022-01-01 RX ADMIN — Medication 125 MG: at 21:40

## 2022-01-01 RX ADMIN — PANTOPRAZOLE SODIUM 40 MG: 40 TABLET, DELAYED RELEASE ORAL at 10:44

## 2022-01-01 RX ADMIN — OXYCODONE HYDROCHLORIDE 5 MG: 5 TABLET ORAL at 06:49

## 2022-01-01 RX ADMIN — PANTOPRAZOLE SODIUM 40 MG: 40 TABLET, DELAYED RELEASE ORAL at 06:09

## 2022-01-01 RX ADMIN — LEVOTHYROXINE SODIUM 125 MCG: 125 TABLET ORAL at 16:12

## 2022-01-01 RX ADMIN — SUCRALFATE 1 G: 1 TABLET ORAL at 22:39

## 2022-01-01 RX ADMIN — Medication 125 MG: at 17:42

## 2022-01-01 RX ADMIN — CLONAZEPAM 3 MG: 1 TABLET ORAL at 21:38

## 2022-01-01 RX ADMIN — CEFTRIAXONE SODIUM 1000 MG: 10 INJECTION, POWDER, FOR SOLUTION INTRAVENOUS at 02:00

## 2022-01-01 RX ADMIN — SUCRALFATE 1 G: 1 TABLET ORAL at 17:01

## 2022-01-01 RX ADMIN — SUCRALFATE 1 G: 1 TABLET ORAL at 06:06

## 2022-01-01 RX ADMIN — HEPARIN SODIUM 5000 UNITS: 5000 INJECTION INTRAVENOUS; SUBCUTANEOUS at 05:42

## 2022-01-01 RX ADMIN — PANTOPRAZOLE SODIUM 40 MG: 40 INJECTION, POWDER, FOR SOLUTION INTRAVENOUS at 21:22

## 2022-01-01 RX ADMIN — TRAZODONE HYDROCHLORIDE 150 MG: 50 TABLET ORAL at 21:24

## 2022-01-01 RX ADMIN — MIDODRINE HYDROCHLORIDE 5 MG: 5 TABLET ORAL at 17:46

## 2022-01-01 RX ADMIN — PANTOPRAZOLE SODIUM 40 MG: 40 TABLET, DELAYED RELEASE ORAL at 08:37

## 2022-01-01 RX ADMIN — OXYCODONE HYDROCHLORIDE 5 MG: 5 TABLET ORAL at 07:16

## 2022-01-01 RX ADMIN — HEPARIN SODIUM 5000 UNITS: 5000 INJECTION INTRAVENOUS; SUBCUTANEOUS at 21:00

## 2022-01-01 RX ADMIN — PANTOPRAZOLE SODIUM 40 MG: 40 TABLET, DELAYED RELEASE ORAL at 18:11

## 2022-01-01 RX ADMIN — HEPARIN SODIUM 5000 UNITS: 5000 INJECTION INTRAVENOUS; SUBCUTANEOUS at 06:29

## 2022-01-01 RX ADMIN — CLONAZEPAM 3 MG: 1 TABLET ORAL at 21:50

## 2022-01-01 RX ADMIN — SODIUM CHLORIDE 50 ML/HR: 0.9 INJECTION, SOLUTION INTRAVENOUS at 13:39

## 2022-01-01 RX ADMIN — HEPARIN SODIUM 5000 UNITS: 5000 INJECTION INTRAVENOUS; SUBCUTANEOUS at 07:25

## 2022-01-01 RX ADMIN — MIDODRINE HYDROCHLORIDE 5 MG: 5 TABLET ORAL at 12:05

## 2022-01-01 RX ADMIN — OXYCODONE HYDROCHLORIDE 2.5 MG: 5 TABLET ORAL at 00:24

## 2022-01-01 RX ADMIN — Medication 125 MG: at 17:46

## 2022-01-01 RX ADMIN — SODIUM CHLORIDE TAB 1 GM 2 G: 1 TAB at 09:16

## 2022-01-01 RX ADMIN — CLONAZEPAM 3 MG: 1 TABLET ORAL at 22:39

## 2022-01-01 RX ADMIN — HEPARIN SODIUM 5000 UNITS: 5000 INJECTION INTRAVENOUS; SUBCUTANEOUS at 22:39

## 2022-01-01 RX ADMIN — Medication 125 MG: at 02:28

## 2022-01-01 RX ADMIN — OXYCODONE HYDROCHLORIDE 5 MG: 5 TABLET ORAL at 06:45

## 2022-01-01 RX ADMIN — Medication 125 MG: at 05:32

## 2022-01-01 RX ADMIN — CLONAZEPAM 3 MG: 1 TABLET ORAL at 22:43

## 2022-01-01 RX ADMIN — FOLIC ACID 1 MG: 1 TABLET ORAL at 09:50

## 2022-01-01 RX ADMIN — SODIUM CHLORIDE TAB 1 GM 2 G: 1 TAB at 22:03

## 2022-01-01 RX ADMIN — SUCRALFATE 1 G: 1 TABLET ORAL at 05:31

## 2022-01-01 RX ADMIN — LEVOTHYROXINE SODIUM 112 MCG: 112 TABLET ORAL at 05:35

## 2022-01-01 RX ADMIN — SODIUM CHLORIDE TAB 1 GM 2 G: 1 TAB at 10:14

## 2022-01-01 RX ADMIN — HEPARIN SODIUM 5000 UNITS: 5000 INJECTION INTRAVENOUS; SUBCUTANEOUS at 06:14

## 2022-01-01 RX ADMIN — THIAMINE HCL TAB 100 MG 100 MG: 100 TAB at 11:07

## 2022-01-01 RX ADMIN — SODIUM CHLORIDE, SODIUM GLUCONATE, SODIUM ACETATE, POTASSIUM CHLORIDE, MAGNESIUM CHLORIDE, SODIUM PHOSPHATE, DIBASIC, AND POTASSIUM PHOSPHATE 100 ML/HR: .53; .5; .37; .037; .03; .012; .00082 INJECTION, SOLUTION INTRAVENOUS at 16:02

## 2022-01-01 RX ADMIN — OXYCODONE HYDROCHLORIDE 5 MG: 5 TABLET ORAL at 05:31

## 2022-01-01 RX ADMIN — SUCRALFATE 1 G: 1 TABLET ORAL at 11:14

## 2022-01-01 RX ADMIN — PANTOPRAZOLE SODIUM 40 MG: 40 TABLET, DELAYED RELEASE ORAL at 06:34

## 2022-01-01 RX ADMIN — MIDODRINE HYDROCHLORIDE 10 MG: 5 TABLET ORAL at 06:45

## 2022-01-01 RX ADMIN — GUAIFENESIN 600 MG: 600 TABLET, EXTENDED RELEASE ORAL at 11:03

## 2022-01-01 RX ADMIN — MIDODRINE HYDROCHLORIDE 10 MG: 5 TABLET ORAL at 06:26

## 2022-01-01 RX ADMIN — PANTOPRAZOLE SODIUM 40 MG: 40 INJECTION, POWDER, FOR SOLUTION INTRAVENOUS at 21:26

## 2022-01-01 RX ADMIN — POLYETHYLENE GLYCOL 3350 17 G: 17 POWDER, FOR SOLUTION ORAL at 21:17

## 2022-01-01 RX ADMIN — LEVOTHYROXINE SODIUM 125 MCG: 125 TABLET ORAL at 06:08

## 2022-01-01 RX ADMIN — FOLIC ACID 1 MG: 1 TABLET ORAL at 08:35

## 2022-01-01 RX ADMIN — PANTOPRAZOLE SODIUM 40 MG: 40 TABLET, DELAYED RELEASE ORAL at 06:44

## 2022-01-01 RX ADMIN — CEFTRIAXONE SODIUM 1000 MG: 10 INJECTION, POWDER, FOR SOLUTION INTRAVENOUS at 19:00

## 2022-01-01 RX ADMIN — CLONAZEPAM 3 MG: 1 TABLET ORAL at 23:14

## 2022-01-01 RX ADMIN — FOLIC ACID 1 MG: 1 TABLET ORAL at 08:31

## 2022-01-01 RX ADMIN — SODIUM CHLORIDE TAB 1 GM 2 G: 1 TAB at 10:59

## 2022-01-01 RX ADMIN — Medication 125 MG: at 11:04

## 2022-01-01 RX ADMIN — ALPRAZOLAM 0.25 MG: 0.25 TABLET ORAL at 16:16

## 2022-01-01 RX ADMIN — PANTOPRAZOLE SODIUM 40 MG: 40 TABLET, DELAYED RELEASE ORAL at 06:08

## 2022-01-01 RX ADMIN — LEVOTHYROXINE SODIUM 125 MCG: 125 TABLET ORAL at 14:40

## 2022-01-01 RX ADMIN — HEPARIN SODIUM 5000 UNITS: 5000 INJECTION INTRAVENOUS; SUBCUTANEOUS at 05:47

## 2022-01-01 RX ADMIN — HEPARIN SODIUM 5000 UNITS: 5000 INJECTION INTRAVENOUS; SUBCUTANEOUS at 17:30

## 2022-01-01 RX ADMIN — LEVOTHYROXINE SODIUM 112 MCG: 112 TABLET ORAL at 06:06

## 2022-01-01 RX ADMIN — LEVALBUTEROL HYDROCHLORIDE 0.63 MG: 0.63 SOLUTION RESPIRATORY (INHALATION) at 11:27

## 2022-01-01 RX ADMIN — OXYCODONE HYDROCHLORIDE 5 MG: 5 TABLET ORAL at 11:56

## 2022-01-01 RX ADMIN — CEFEPIME HYDROCHLORIDE 1000 MG: 1 INJECTION, POWDER, FOR SOLUTION INTRAMUSCULAR; INTRAVENOUS at 13:49

## 2022-01-01 RX ADMIN — OXYCODONE HYDROCHLORIDE 5 MG: 5 TABLET ORAL at 11:11

## 2022-01-01 RX ADMIN — OXYCODONE HYDROCHLORIDE 5 MG: 5 TABLET ORAL at 04:53

## 2022-01-01 RX ADMIN — SODIUM BICARBONATE 650 MG TABLET 650 MG: at 10:10

## 2022-01-01 RX ADMIN — CLONAZEPAM 3 MG: 1 TABLET ORAL at 22:36

## 2022-01-01 RX ADMIN — SODIUM CHLORIDE TAB 1 GM 2 G: 1 TAB at 21:46

## 2022-01-01 RX ADMIN — GUAIFENESIN 600 MG: 600 TABLET, EXTENDED RELEASE ORAL at 21:17

## 2022-01-01 RX ADMIN — HEPARIN SODIUM 5000 UNITS: 5000 INJECTION INTRAVENOUS; SUBCUTANEOUS at 21:33

## 2022-01-01 RX ADMIN — SODIUM CHLORIDE TAB 1 GM 2 G: 1 TAB at 17:23

## 2022-01-01 RX ADMIN — SODIUM CHLORIDE TAB 1 GM 2 G: 1 TAB at 07:55

## 2022-01-01 RX ADMIN — Medication 125 MG: at 09:29

## 2022-01-01 RX ADMIN — CLONAZEPAM 3 MG: 1 TABLET ORAL at 22:53

## 2022-01-01 RX ADMIN — GUAIFENESIN 600 MG: 600 TABLET, EXTENDED RELEASE ORAL at 11:19

## 2022-01-01 RX ADMIN — HEPARIN SODIUM 5000 UNITS: 5000 INJECTION INTRAVENOUS; SUBCUTANEOUS at 17:35

## 2022-01-01 RX ADMIN — GUAIFENESIN 600 MG: 600 TABLET, EXTENDED RELEASE ORAL at 09:16

## 2022-01-01 RX ADMIN — GUAIFENESIN 600 MG: 600 TABLET, EXTENDED RELEASE ORAL at 13:26

## 2022-01-01 RX ADMIN — OXYCODONE HYDROCHLORIDE 5 MG: 5 TABLET ORAL at 12:58

## 2022-01-01 RX ADMIN — Medication 125 MG: at 16:33

## 2022-01-01 RX ADMIN — SUCRALFATE 1 G: 1 TABLET ORAL at 11:19

## 2022-01-01 RX ADMIN — SENNOSIDES 17.2 MG: 8.6 TABLET, FILM COATED ORAL at 22:11

## 2022-01-01 RX ADMIN — CLONAZEPAM 3 MG: 1 TABLET ORAL at 22:29

## 2022-01-01 RX ADMIN — MIDODRINE HYDROCHLORIDE 10 MG: 5 TABLET ORAL at 11:20

## 2022-01-01 RX ADMIN — Medication 125 MG: at 12:03

## 2022-01-01 RX ADMIN — HEPARIN SODIUM 5000 UNITS: 5000 INJECTION INTRAVENOUS; SUBCUTANEOUS at 05:17

## 2022-01-01 RX ADMIN — SODIUM CHLORIDE TAB 1 GM 2 G: 1 TAB at 16:09

## 2022-01-01 RX ADMIN — PANTOPRAZOLE SODIUM 40 MG: 40 TABLET, DELAYED RELEASE ORAL at 10:50

## 2022-01-01 RX ADMIN — GUAIFENESIN 600 MG: 600 TABLET, EXTENDED RELEASE ORAL at 21:04

## 2022-01-01 RX ADMIN — HEPARIN SODIUM 5000 UNITS: 5000 INJECTION INTRAVENOUS; SUBCUTANEOUS at 22:15

## 2022-01-01 RX ADMIN — FOLIC ACID 1 MG: 1 TABLET ORAL at 08:59

## 2022-01-01 RX ADMIN — POLYETHYLENE GLYCOL 3350 17 G: 17 POWDER, FOR SOLUTION ORAL at 09:48

## 2022-01-01 RX ADMIN — PANTOPRAZOLE SODIUM 40 MG: 40 INJECTION, POWDER, FOR SOLUTION INTRAVENOUS at 21:53

## 2022-01-01 RX ADMIN — THIAMINE HCL TAB 100 MG 100 MG: 100 TAB at 09:16

## 2022-01-01 RX ADMIN — OXYCODONE HYDROCHLORIDE 5 MG: 5 TABLET ORAL at 11:07

## 2022-01-01 RX ADMIN — MIDODRINE HYDROCHLORIDE 10 MG: 5 TABLET ORAL at 17:38

## 2022-01-01 RX ADMIN — HEPARIN SODIUM 5000 UNITS: 5000 INJECTION INTRAVENOUS; SUBCUTANEOUS at 14:45

## 2022-01-01 RX ADMIN — SUCRALFATE 1 G: 1 TABLET ORAL at 16:56

## 2022-01-01 RX ADMIN — CLONAZEPAM 3 MG: 1 TABLET ORAL at 21:27

## 2022-01-01 RX ADMIN — HEPARIN SODIUM 5000 UNITS: 5000 INJECTION INTRAVENOUS; SUBCUTANEOUS at 23:04

## 2022-01-01 RX ADMIN — OXYCODONE HYDROCHLORIDE 5 MG: 5 TABLET ORAL at 10:12

## 2022-01-01 RX ADMIN — PANTOPRAZOLE SODIUM 40 MG: 40 TABLET, DELAYED RELEASE ORAL at 17:42

## 2022-01-01 RX ADMIN — SUCRALFATE 1 G: 1 TABLET ORAL at 12:08

## 2022-01-01 RX ADMIN — PANTOPRAZOLE SODIUM 40 MG: 40 TABLET, DELAYED RELEASE ORAL at 06:45

## 2022-01-01 RX ADMIN — OXYCODONE HYDROCHLORIDE 5 MG: 5 TABLET ORAL at 12:13

## 2022-01-01 RX ADMIN — DAPTOMYCIN 325 MG: 500 INJECTION, POWDER, LYOPHILIZED, FOR SOLUTION INTRAVENOUS at 17:45

## 2022-01-01 RX ADMIN — PANTOPRAZOLE SODIUM 40 MG: 40 TABLET, DELAYED RELEASE ORAL at 09:57

## 2022-01-01 RX ADMIN — SODIUM CHLORIDE TAB 1 GM 2 G: 1 TAB at 17:35

## 2022-01-01 RX ADMIN — POLYETHYLENE GLYCOL 3350 17 G: 17 POWDER, FOR SOLUTION ORAL at 20:34

## 2022-01-01 RX ADMIN — LEVOTHYROXINE SODIUM 112 MCG: 112 TABLET ORAL at 13:13

## 2022-01-01 RX ADMIN — MIDODRINE HYDROCHLORIDE 10 MG: 5 TABLET ORAL at 08:16

## 2022-01-01 RX ADMIN — THIAMINE HCL TAB 100 MG 100 MG: 100 TAB at 09:42

## 2022-01-01 RX ADMIN — Medication 125 MG: at 17:11

## 2022-01-01 RX ADMIN — SODIUM CHLORIDE TAB 1 GM 2 G: 1 TAB at 17:49

## 2022-01-01 RX ADMIN — DEXTROSE MONOHYDRATE 25 ML: 25 INJECTION, SOLUTION INTRAVENOUS at 17:56

## 2022-01-01 RX ADMIN — FOLIC ACID 1 MG: 1 TABLET ORAL at 08:41

## 2022-01-01 RX ADMIN — SODIUM CHLORIDE TAB 1 GM 2 G: 1 TAB at 21:02

## 2022-01-01 RX ADMIN — OXYCODONE HYDROCHLORIDE 5 MG: 5 TABLET ORAL at 22:11

## 2022-01-01 RX ADMIN — SODIUM CHLORIDE 75 ML/HR: 0.9 INJECTION, SOLUTION INTRAVENOUS at 16:36

## 2022-01-01 RX ADMIN — TRAZODONE HYDROCHLORIDE 150 MG: 50 TABLET ORAL at 21:17

## 2022-01-01 RX ADMIN — ACETAMINOPHEN 650 MG: 325 TABLET, FILM COATED ORAL at 12:21

## 2022-01-01 RX ADMIN — Medication 125 MG: at 06:21

## 2022-01-01 RX ADMIN — CLONAZEPAM 3 MG: 1 TABLET ORAL at 21:21

## 2022-01-01 RX ADMIN — Medication 125 MG: at 00:25

## 2022-01-01 RX ADMIN — OXYCODONE HYDROCHLORIDE 2.5 MG: 5 TABLET ORAL at 17:50

## 2022-01-01 RX ADMIN — HEPARIN SODIUM 5000 UNITS: 5000 INJECTION INTRAVENOUS; SUBCUTANEOUS at 14:21

## 2022-01-01 RX ADMIN — TRAZODONE HYDROCHLORIDE 150 MG: 50 TABLET ORAL at 22:19

## 2022-01-01 RX ADMIN — MIDODRINE HYDROCHLORIDE 5 MG: 5 TABLET ORAL at 07:53

## 2022-01-01 RX ADMIN — OXYCODONE HYDROCHLORIDE 5 MG: 5 TABLET ORAL at 17:05

## 2022-01-01 RX ADMIN — HEPARIN SODIUM 5000 UNITS: 5000 INJECTION INTRAVENOUS; SUBCUTANEOUS at 16:55

## 2022-01-01 RX ADMIN — OXYCODONE HYDROCHLORIDE 5 MG: 5 TABLET ORAL at 10:38

## 2022-01-01 RX ADMIN — SODIUM CHLORIDE TAB 1 GM 2 G: 1 TAB at 13:27

## 2022-01-01 RX ADMIN — SODIUM CHLORIDE TAB 1 GM 2 G: 1 TAB at 09:42

## 2022-01-01 RX ADMIN — HEPARIN SODIUM 5000 UNITS: 5000 INJECTION INTRAVENOUS; SUBCUTANEOUS at 14:53

## 2022-01-01 RX ADMIN — OXYCODONE HYDROCHLORIDE 5 MG: 5 TABLET ORAL at 06:00

## 2022-01-01 RX ADMIN — Medication: at 21:54

## 2022-01-01 RX ADMIN — PANTOPRAZOLE SODIUM 40 MG: 40 TABLET, DELAYED RELEASE ORAL at 07:59

## 2022-01-01 RX ADMIN — HEPARIN SODIUM 5000 UNITS: 5000 INJECTION INTRAVENOUS; SUBCUTANEOUS at 21:14

## 2022-01-01 RX ADMIN — THIAMINE HCL TAB 100 MG 100 MG: 100 TAB at 09:34

## 2022-01-01 RX ADMIN — MIDODRINE HYDROCHLORIDE 10 MG: 5 TABLET ORAL at 09:41

## 2022-01-01 RX ADMIN — Medication 125 MG: at 20:17

## 2022-01-01 RX ADMIN — SUCRALFATE 1 G: 1 TABLET ORAL at 07:59

## 2022-01-01 RX ADMIN — MIDODRINE HYDROCHLORIDE 5 MG: 5 TABLET ORAL at 06:37

## 2022-01-01 RX ADMIN — HEPARIN SODIUM 5000 UNITS: 5000 INJECTION INTRAVENOUS; SUBCUTANEOUS at 05:44

## 2022-01-01 RX ADMIN — SUCRALFATE 1 G: 1 TABLET ORAL at 23:14

## 2022-01-01 RX ADMIN — GUAIFENESIN 600 MG: 600 TABLET, EXTENDED RELEASE ORAL at 10:08

## 2022-01-01 RX ADMIN — MIDODRINE HYDROCHLORIDE 10 MG: 5 TABLET ORAL at 10:58

## 2022-01-01 RX ADMIN — DAPTOMYCIN 325 MG: 500 INJECTION, POWDER, LYOPHILIZED, FOR SOLUTION INTRAVENOUS at 14:03

## 2022-01-01 RX ADMIN — SUCRALFATE 1 G: 1 TABLET ORAL at 18:29

## 2022-01-01 RX ADMIN — Medication 125 MG: at 11:29

## 2022-01-01 RX ADMIN — SENNOSIDES 17.2 MG: 8.6 TABLET, FILM COATED ORAL at 21:39

## 2022-01-01 RX ADMIN — CLONAZEPAM 3 MG: 1 TABLET ORAL at 21:02

## 2022-01-01 RX ADMIN — OXYCODONE HYDROCHLORIDE 5 MG: 5 TABLET ORAL at 12:19

## 2022-01-01 RX ADMIN — SUCRALFATE 1 G: 1 TABLET ORAL at 17:02

## 2022-01-01 RX ADMIN — OXYCODONE HYDROCHLORIDE 2.5 MG: 5 TABLET ORAL at 06:21

## 2022-01-01 RX ADMIN — TRAZODONE HYDROCHLORIDE 150 MG: 50 TABLET ORAL at 21:36

## 2022-01-01 RX ADMIN — ALBUMIN (HUMAN) 25 G: 0.25 INJECTION, SOLUTION INTRAVENOUS at 11:04

## 2022-01-01 RX ADMIN — TRAZODONE HYDROCHLORIDE 150 MG: 50 TABLET ORAL at 22:39

## 2022-01-01 RX ADMIN — Medication 125 MG: at 10:37

## 2022-01-01 RX ADMIN — POLYETHYLENE GLYCOL 3350 17 G: 17 POWDER, FOR SOLUTION ORAL at 21:40

## 2022-01-01 RX ADMIN — Medication 125 MG: at 21:33

## 2022-01-01 RX ADMIN — MIDODRINE HYDROCHLORIDE 5 MG: 5 TABLET ORAL at 09:21

## 2022-01-01 RX ADMIN — SODIUM CHLORIDE TAB 1 GM 2 G: 1 TAB at 20:42

## 2022-01-01 RX ADMIN — Medication 125 MG: at 06:08

## 2022-01-01 RX ADMIN — SUCRALFATE 1 G: 1 TABLET ORAL at 21:16

## 2022-01-01 RX ADMIN — PANTOPRAZOLE SODIUM 40 MG: 40 TABLET, DELAYED RELEASE ORAL at 05:50

## 2022-01-01 RX ADMIN — MIDODRINE HYDROCHLORIDE 10 MG: 5 TABLET ORAL at 18:18

## 2022-01-01 RX ADMIN — HEPARIN SODIUM 5000 UNITS: 5000 INJECTION INTRAVENOUS; SUBCUTANEOUS at 17:18

## 2022-01-01 RX ADMIN — LEVOTHYROXINE SODIUM 112 MCG: 112 TABLET ORAL at 06:19

## 2022-01-01 RX ADMIN — MIDODRINE HYDROCHLORIDE 7.5 MG: 5 TABLET ORAL at 16:54

## 2022-01-01 RX ADMIN — CLONAZEPAM 3 MG: 1 TABLET ORAL at 21:51

## 2022-01-01 RX ADMIN — LEVOTHYROXINE SODIUM 112 MCG: 112 TABLET ORAL at 10:23

## 2022-01-01 RX ADMIN — Medication 125 MG: at 00:24

## 2022-01-01 RX ADMIN — ACETAMINOPHEN 975 MG: 325 TABLET ORAL at 11:14

## 2022-01-01 RX ADMIN — THIAMINE HCL TAB 100 MG 100 MG: 100 TAB at 11:18

## 2022-01-01 RX ADMIN — VANCOMYCIN HYDROCHLORIDE 1250 MG: 10 INJECTION, POWDER, LYOPHILIZED, FOR SOLUTION INTRAVENOUS at 07:16

## 2022-01-01 RX ADMIN — POLYETHYLENE GLYCOL 3350 17 G: 17 POWDER, FOR SOLUTION ORAL at 22:11

## 2022-01-01 RX ADMIN — PANTOPRAZOLE SODIUM 40 MG: 40 TABLET, DELAYED RELEASE ORAL at 16:08

## 2022-01-01 RX ADMIN — TRAZODONE HYDROCHLORIDE 150 MG: 50 TABLET ORAL at 22:15

## 2022-01-01 RX ADMIN — GUAIFENESIN 600 MG: 600 TABLET, EXTENDED RELEASE ORAL at 10:58

## 2022-01-01 RX ADMIN — Medication 125 MG: at 00:15

## 2022-01-01 RX ADMIN — CLONAZEPAM 3 MG: 1 TABLET ORAL at 21:18

## 2022-01-01 RX ADMIN — DAPTOMYCIN 325 MG: 500 INJECTION, POWDER, LYOPHILIZED, FOR SOLUTION INTRAVENOUS at 14:27

## 2022-01-01 RX ADMIN — OXYCODONE HYDROCHLORIDE 5 MG: 5 TABLET ORAL at 12:45

## 2022-01-01 RX ADMIN — MIDODRINE HYDROCHLORIDE 10 MG: 5 TABLET ORAL at 05:55

## 2022-01-01 RX ADMIN — FOLIC ACID 1 MG: 1 TABLET ORAL at 09:37

## 2022-01-01 RX ADMIN — OXYCODONE HYDROCHLORIDE 2.5 MG: 5 TABLET ORAL at 20:09

## 2022-01-01 RX ADMIN — GUAIFENESIN 600 MG: 600 TABLET, EXTENDED RELEASE ORAL at 10:04

## 2022-01-01 RX ADMIN — MIDODRINE HYDROCHLORIDE 5 MG: 5 TABLET ORAL at 17:45

## 2022-01-01 RX ADMIN — FOLIC ACID 1 MG: 1 TABLET ORAL at 09:27

## 2022-01-01 RX ADMIN — OXYCODONE HYDROCHLORIDE 5 MG: 5 TABLET ORAL at 05:55

## 2022-01-01 RX ADMIN — VANCOMYCIN HYDROCHLORIDE 1000 MG: 1 INJECTION, SOLUTION INTRAVENOUS at 06:14

## 2022-01-01 RX ADMIN — CLONAZEPAM 3 MG: 1 TABLET ORAL at 21:14

## 2022-01-01 RX ADMIN — MIDODRINE HYDROCHLORIDE 10 MG: 5 TABLET ORAL at 11:51

## 2022-01-01 RX ADMIN — FOLIC ACID 1 MG: 1 TABLET ORAL at 08:49

## 2022-01-01 RX ADMIN — HEPARIN SODIUM 5000 UNITS: 5000 INJECTION INTRAVENOUS; SUBCUTANEOUS at 21:22

## 2022-01-01 RX ADMIN — MIDODRINE HYDROCHLORIDE 10 MG: 5 TABLET ORAL at 06:21

## 2022-01-01 RX ADMIN — PANTOPRAZOLE SODIUM 40 MG: 40 TABLET, DELAYED RELEASE ORAL at 16:06

## 2022-01-01 RX ADMIN — SODIUM CHLORIDE TAB 1 GM 1 G: 1 TAB at 18:01

## 2022-01-01 RX ADMIN — SUCRALFATE 1 G: 1 TABLET ORAL at 21:53

## 2022-01-01 RX ADMIN — OXYCODONE HYDROCHLORIDE 5 MG: 5 TABLET ORAL at 11:08

## 2022-01-01 RX ADMIN — CLONAZEPAM 3 MG: 1 TABLET ORAL at 22:11

## 2022-01-01 RX ADMIN — THIAMINE HCL TAB 100 MG 100 MG: 100 TAB at 10:36

## 2022-01-01 RX ADMIN — SODIUM CHLORIDE TAB 1 GM 2 G: 1 TAB at 15:59

## 2022-01-01 RX ADMIN — SENNOSIDES 17.2 MG: 8.6 TABLET, FILM COATED ORAL at 00:30

## 2022-01-01 RX ADMIN — SUCRALFATE 1 G: 1 TABLET ORAL at 18:24

## 2022-01-01 RX ADMIN — SUCRALFATE 1 G: 1 TABLET ORAL at 17:32

## 2022-01-01 RX ADMIN — CLONAZEPAM 3 MG: 1 TABLET ORAL at 22:01

## 2022-01-01 RX ADMIN — TRAZODONE HYDROCHLORIDE 150 MG: 50 TABLET ORAL at 21:52

## 2022-01-01 RX ADMIN — SODIUM CHLORIDE TAB 1 GM 2 G: 1 TAB at 16:12

## 2022-01-01 RX ADMIN — OXYCODONE HYDROCHLORIDE 5 MG: 5 TABLET ORAL at 16:53

## 2022-01-01 RX ADMIN — CLONAZEPAM 3 MG: 1 TABLET ORAL at 22:03

## 2022-01-01 RX ADMIN — CLONAZEPAM 3 MG: 1 TABLET ORAL at 22:10

## 2022-01-01 RX ADMIN — OXYCODONE HYDROCHLORIDE 5 MG: 5 TABLET ORAL at 11:42

## 2022-01-01 RX ADMIN — SODIUM CHLORIDE TAB 1 GM 2 G: 1 TAB at 08:29

## 2022-01-01 RX ADMIN — SUCRALFATE 1 G: 1 TABLET ORAL at 08:25

## 2022-01-01 RX ADMIN — OXYCODONE HYDROCHLORIDE 5 MG: 5 TABLET ORAL at 06:14

## 2022-01-01 RX ADMIN — OXYCODONE HYDROCHLORIDE 5 MG: 5 TABLET ORAL at 16:45

## 2022-01-01 RX ADMIN — SUCRALFATE 1 G: 1 TABLET ORAL at 21:24

## 2022-01-01 RX ADMIN — TRAZODONE HYDROCHLORIDE 150 MG: 50 TABLET ORAL at 22:29

## 2022-01-01 RX ADMIN — SUCRALFATE 1 G: 1 TABLET ORAL at 17:55

## 2022-01-01 RX ADMIN — SUCRALFATE 1 G: 1 TABLET ORAL at 10:21

## 2022-01-01 RX ADMIN — MIDODRINE HYDROCHLORIDE 10 MG: 5 TABLET ORAL at 07:55

## 2022-01-01 RX ADMIN — THIAMINE HCL TAB 100 MG 100 MG: 100 TAB at 11:12

## 2022-01-01 RX ADMIN — MIDODRINE HYDROCHLORIDE 5 MG: 5 TABLET ORAL at 11:40

## 2022-01-01 RX ADMIN — SUCRALFATE 1 G: 1 TABLET ORAL at 22:19

## 2022-01-01 RX ADMIN — Medication 125 MG: at 12:16

## 2022-01-01 RX ADMIN — OXYCODONE HYDROCHLORIDE 5 MG: 5 TABLET ORAL at 06:17

## 2022-01-01 RX ADMIN — MIDODRINE HYDROCHLORIDE 5 MG: 5 TABLET ORAL at 08:25

## 2022-01-01 RX ADMIN — MIDODRINE HYDROCHLORIDE 10 MG: 5 TABLET ORAL at 17:55

## 2022-01-01 RX ADMIN — SUCRALFATE 1 G: 1 TABLET ORAL at 11:01

## 2022-01-01 RX ADMIN — FOLIC ACID 1 MG: 1 TABLET ORAL at 07:53

## 2022-01-01 RX ADMIN — THIAMINE HCL TAB 100 MG 100 MG: 100 TAB at 10:08

## 2022-01-01 RX ADMIN — Medication: at 23:18

## 2022-01-01 RX ADMIN — LEVOTHYROXINE SODIUM 112 MCG: 112 TABLET ORAL at 05:38

## 2022-01-01 RX ADMIN — HEPARIN SODIUM 5000 UNITS: 5000 INJECTION INTRAVENOUS; SUBCUTANEOUS at 13:50

## 2022-01-01 RX ADMIN — CLONAZEPAM 3 MG: 1 TABLET ORAL at 22:12

## 2022-01-01 RX ADMIN — GUAIFENESIN 600 MG: 600 TABLET, EXTENDED RELEASE ORAL at 22:43

## 2022-01-01 RX ADMIN — PANTOPRAZOLE SODIUM 40 MG: 40 TABLET, DELAYED RELEASE ORAL at 17:37

## 2022-01-01 RX ADMIN — LEVOTHYROXINE SODIUM 112 MCG: 112 TABLET ORAL at 05:31

## 2022-01-01 RX ADMIN — OXYCODONE HYDROCHLORIDE 5 MG: 5 TABLET ORAL at 18:10

## 2022-01-01 RX ADMIN — MIDODRINE HYDROCHLORIDE 10 MG: 5 TABLET ORAL at 09:16

## 2022-01-01 RX ADMIN — CLONAZEPAM 3 MG: 1 TABLET ORAL at 21:33

## 2022-01-01 RX ADMIN — LEVOTHYROXINE SODIUM 125 MCG: 125 TABLET ORAL at 15:12

## 2022-01-01 RX ADMIN — OXYCODONE HYDROCHLORIDE 5 MG: 5 TABLET ORAL at 13:30

## 2022-01-01 RX ADMIN — LEVOTHYROXINE SODIUM 125 MCG: 125 TABLET ORAL at 16:39

## 2022-01-01 RX ADMIN — LEVOTHYROXINE SODIUM 125 MCG: 125 TABLET ORAL at 16:09

## 2022-01-01 RX ADMIN — HEPARIN SODIUM 5000 UNITS: 5000 INJECTION INTRAVENOUS; SUBCUTANEOUS at 13:42

## 2022-01-01 RX ADMIN — SODIUM BICARBONATE 650 MG TABLET 650 MG: at 17:23

## 2022-01-01 RX ADMIN — CEFEPIME 2000 MG: 2 INJECTION, POWDER, FOR SOLUTION INTRAVENOUS at 02:15

## 2022-01-01 RX ADMIN — OXYCODONE HYDROCHLORIDE 5 MG: 5 TABLET ORAL at 22:17

## 2022-01-01 RX ADMIN — OXYCODONE HYDROCHLORIDE 5 MG: 5 TABLET ORAL at 17:40

## 2022-01-01 RX ADMIN — SENNOSIDES 17.2 MG: 8.6 TABLET, FILM COATED ORAL at 21:15

## 2022-01-01 RX ADMIN — MIDODRINE HYDROCHLORIDE 5 MG: 5 TABLET ORAL at 08:41

## 2022-01-01 RX ADMIN — LEVOTHYROXINE SODIUM 112 MCG: 112 TABLET ORAL at 05:45

## 2022-01-01 RX ADMIN — MIDODRINE HYDROCHLORIDE 10 MG: 5 TABLET ORAL at 11:07

## 2022-01-01 RX ADMIN — HEPARIN SODIUM 5000 UNITS: 5000 INJECTION INTRAVENOUS; SUBCUTANEOUS at 13:40

## 2022-01-01 RX ADMIN — SODIUM BICARBONATE 650 MG TABLET 650 MG: at 17:22

## 2022-01-01 RX ADMIN — SUCRALFATE 1 G: 1 TABLET ORAL at 16:33

## 2022-01-01 RX ADMIN — CLONAZEPAM 3 MG: 1 TABLET ORAL at 23:19

## 2022-01-01 RX ADMIN — FOLIC ACID 1 MG: 1 TABLET ORAL at 08:37

## 2022-01-01 RX ADMIN — MIDODRINE HYDROCHLORIDE 5 MG: 5 TABLET ORAL at 13:13

## 2022-01-01 RX ADMIN — POLYETHYLENE GLYCOL 3350 17 G: 17 POWDER, FOR SOLUTION ORAL at 00:38

## 2022-01-01 RX ADMIN — HEPARIN SODIUM 5000 UNITS: 5000 INJECTION INTRAVENOUS; SUBCUTANEOUS at 06:17

## 2022-01-01 RX ADMIN — OXYCODONE HYDROCHLORIDE 5 MG: 5 TABLET ORAL at 22:08

## 2022-01-01 RX ADMIN — LEVOTHYROXINE SODIUM 112 MCG: 112 TABLET ORAL at 05:19

## 2022-01-01 RX ADMIN — THIAMINE HCL TAB 100 MG 100 MG: 100 TAB at 08:15

## 2022-01-01 RX ADMIN — OXYCODONE HYDROCHLORIDE 5 MG: 5 TABLET ORAL at 12:22

## 2022-01-01 RX ADMIN — BNT162B2 ORIGINAL AND OMICRON BA.4/BA.5 0.3 ML: .1125; .1125 INJECTION, SUSPENSION INTRAMUSCULAR at 17:01

## 2022-01-01 RX ADMIN — PANTOPRAZOLE SODIUM 40 MG: 40 TABLET, DELAYED RELEASE ORAL at 06:18

## 2022-01-01 RX ADMIN — SUCRALFATE 1 G: 1 TABLET ORAL at 08:37

## 2022-01-01 RX ADMIN — PANTOPRAZOLE SODIUM 40 MG: 40 TABLET, DELAYED RELEASE ORAL at 18:01

## 2022-01-01 RX ADMIN — Medication 125 MG: at 16:42

## 2022-01-01 RX ADMIN — PANTOPRAZOLE SODIUM 40 MG: 40 TABLET, DELAYED RELEASE ORAL at 15:32

## 2022-01-01 RX ADMIN — GUAIFENESIN 600 MG: 600 TABLET, EXTENDED RELEASE ORAL at 10:12

## 2022-01-01 RX ADMIN — SENNOSIDES 17.2 MG: 8.6 TABLET, FILM COATED ORAL at 22:03

## 2022-01-01 RX ADMIN — GUAIFENESIN 600 MG: 600 TABLET, EXTENDED RELEASE ORAL at 10:55

## 2022-01-01 RX ADMIN — PANTOPRAZOLE SODIUM 40 MG: 40 TABLET, DELAYED RELEASE ORAL at 16:52

## 2022-01-01 RX ADMIN — THIAMINE HCL TAB 100 MG 100 MG: 100 TAB at 10:04

## 2022-01-01 RX ADMIN — CEFAZOLIN SODIUM 1000 MG: 1 SOLUTION INTRAVENOUS at 17:31

## 2022-01-01 RX ADMIN — SODIUM CHLORIDE TAB 1 GM 2 G: 1 TAB at 17:54

## 2022-01-01 RX ADMIN — PANTOPRAZOLE SODIUM 40 MG: 40 TABLET, DELAYED RELEASE ORAL at 05:51

## 2022-01-01 RX ADMIN — HEPARIN SODIUM 5000 UNITS: 5000 INJECTION INTRAVENOUS; SUBCUTANEOUS at 09:26

## 2022-01-01 RX ADMIN — IOHEXOL 50 ML: 240 INJECTION, SOLUTION INTRATHECAL; INTRAVASCULAR; INTRAVENOUS; ORAL at 03:01

## 2022-01-01 RX ADMIN — HEPARIN SODIUM 5000 UNITS: 5000 INJECTION INTRAVENOUS; SUBCUTANEOUS at 17:36

## 2022-01-01 RX ADMIN — MIDODRINE HYDROCHLORIDE 5 MG: 5 TABLET ORAL at 16:30

## 2022-01-01 RX ADMIN — OXYCODONE HYDROCHLORIDE 5 MG: 5 TABLET ORAL at 06:21

## 2022-01-01 RX ADMIN — Medication 125 MG: at 05:44

## 2022-01-01 RX ADMIN — SUCRALFATE 1 G: 1 TABLET ORAL at 17:58

## 2022-01-01 RX ADMIN — MIDODRINE HYDROCHLORIDE 5 MG: 5 TABLET ORAL at 11:22

## 2022-01-01 RX ADMIN — SODIUM CHLORIDE TAB 1 GM 1 G: 1 TAB at 10:44

## 2022-01-01 RX ADMIN — Medication 125 MG: at 23:46

## 2022-01-01 RX ADMIN — MIDODRINE HYDROCHLORIDE 10 MG: 5 TABLET ORAL at 17:49

## 2022-01-01 RX ADMIN — OXYCODONE HYDROCHLORIDE 5 MG: 5 TABLET ORAL at 13:25

## 2022-01-01 RX ADMIN — SUCRALFATE 1 G: 1 TABLET ORAL at 11:51

## 2022-01-01 RX ADMIN — SUCRALFATE 1 G: 1 TABLET ORAL at 08:44

## 2022-01-01 RX ADMIN — MIDODRINE HYDROCHLORIDE 10 MG: 5 TABLET ORAL at 18:17

## 2022-01-01 RX ADMIN — CLONAZEPAM 3 MG: 1 TABLET ORAL at 21:28

## 2022-01-01 RX ADMIN — OXYCODONE HYDROCHLORIDE 5 MG: 5 TABLET ORAL at 17:50

## 2022-01-01 RX ADMIN — PANTOPRAZOLE SODIUM 40 MG: 40 TABLET, DELAYED RELEASE ORAL at 06:50

## 2022-01-01 RX ADMIN — PANTOPRAZOLE SODIUM 40 MG: 40 TABLET, DELAYED RELEASE ORAL at 18:23

## 2022-01-01 RX ADMIN — SUCRALFATE 1 G: 1 TABLET ORAL at 21:52

## 2022-01-01 RX ADMIN — OXYCODONE HYDROCHLORIDE 5 MG: 5 TABLET ORAL at 12:01

## 2022-01-01 RX ADMIN — Medication 125 MG: at 05:19

## 2022-01-01 RX ADMIN — HEPARIN SODIUM 5000 UNITS: 5000 INJECTION INTRAVENOUS; SUBCUTANEOUS at 21:08

## 2022-01-01 RX ADMIN — MIDODRINE HYDROCHLORIDE 5 MG: 5 TABLET ORAL at 16:47

## 2022-01-01 RX ADMIN — THIAMINE HCL TAB 100 MG 100 MG: 100 TAB at 09:37

## 2022-01-01 RX ADMIN — HEPARIN SODIUM 5000 UNITS: 5000 INJECTION INTRAVENOUS; SUBCUTANEOUS at 13:13

## 2022-01-01 RX ADMIN — GUAIFENESIN 600 MG: 600 TABLET, EXTENDED RELEASE ORAL at 09:26

## 2022-01-01 RX ADMIN — SUCRALFATE 1 G: 1 TABLET ORAL at 18:11

## 2022-01-01 RX ADMIN — FOLIC ACID 1 MG: 1 TABLET ORAL at 08:40

## 2022-01-01 RX ADMIN — POLYETHYLENE GLYCOL 3350 17 G: 17 POWDER, FOR SOLUTION ORAL at 22:43

## 2022-01-01 RX ADMIN — HEPARIN SODIUM 5000 UNITS: 5000 INJECTION INTRAVENOUS; SUBCUTANEOUS at 05:38

## 2022-01-01 RX ADMIN — MIDODRINE HYDROCHLORIDE 10 MG: 5 TABLET ORAL at 15:25

## 2022-01-01 RX ADMIN — PANTOPRAZOLE SODIUM 40 MG: 40 TABLET, DELAYED RELEASE ORAL at 05:52

## 2022-01-01 RX ADMIN — PANTOPRAZOLE SODIUM 40 MG: 40 TABLET, DELAYED RELEASE ORAL at 16:42

## 2022-01-01 RX ADMIN — HEPARIN SODIUM 5000 UNITS: 5000 INJECTION INTRAVENOUS; SUBCUTANEOUS at 06:12

## 2022-01-01 RX ADMIN — GUAIFENESIN 600 MG: 600 TABLET, EXTENDED RELEASE ORAL at 10:33

## 2022-01-01 RX ADMIN — HEPARIN SODIUM 5000 UNITS: 5000 INJECTION INTRAVENOUS; SUBCUTANEOUS at 15:01

## 2022-01-01 RX ADMIN — LEVOTHYROXINE SODIUM 125 MCG: 125 TABLET ORAL at 05:50

## 2022-01-01 RX ADMIN — MIDODRINE HYDROCHLORIDE 10 MG: 5 TABLET ORAL at 11:27

## 2022-01-01 RX ADMIN — Medication 125 MG: at 08:59

## 2022-01-01 RX ADMIN — SODIUM CHLORIDE, SODIUM GLUCONATE, SODIUM ACETATE, POTASSIUM CHLORIDE, MAGNESIUM CHLORIDE, SODIUM PHOSPHATE, DIBASIC, AND POTASSIUM PHOSPHATE 100 ML/HR: .53; .5; .37; .037; .03; .012; .00082 INJECTION, SOLUTION INTRAVENOUS at 12:16

## 2022-01-01 RX ADMIN — MIDODRINE HYDROCHLORIDE 10 MG: 5 TABLET ORAL at 06:24

## 2022-01-01 RX ADMIN — SUCRALFATE 1 G: 1 TABLET ORAL at 05:16

## 2022-01-01 RX ADMIN — SENNOSIDES 17.2 MG: 8.6 TABLET, FILM COATED ORAL at 21:19

## 2022-01-01 RX ADMIN — MIDODRINE HYDROCHLORIDE 10 MG: 5 TABLET ORAL at 07:54

## 2022-02-08 ENCOUNTER — OFFICE VISIT (OUTPATIENT)
Dept: FAMILY MEDICINE CLINIC | Facility: CLINIC | Age: 77
End: 2022-02-08
Payer: COMMERCIAL

## 2022-02-08 VITALS
HEART RATE: 87 BPM | SYSTOLIC BLOOD PRESSURE: 130 MMHG | DIASTOLIC BLOOD PRESSURE: 70 MMHG | TEMPERATURE: 97.2 F | HEIGHT: 65 IN | OXYGEN SATURATION: 98 % | RESPIRATION RATE: 16 BRPM | WEIGHT: 128.4 LBS | BODY MASS INDEX: 21.39 KG/M2

## 2022-02-08 DIAGNOSIS — R09.81 NASAL CONGESTION: ICD-10-CM

## 2022-02-08 DIAGNOSIS — H91.93 BILATERAL HEARING LOSS, UNSPECIFIED HEARING LOSS TYPE: ICD-10-CM

## 2022-02-08 DIAGNOSIS — H61.21 CERUMEN DEBRIS ON TYMPANIC MEMBRANE OF RIGHT EAR: Primary | ICD-10-CM

## 2022-02-08 PROCEDURE — 99213 OFFICE O/P EST LOW 20 MIN: CPT | Performed by: INTERNAL MEDICINE

## 2022-02-08 RX ORDER — FLUTICASONE PROPIONATE 50 MCG
1 SPRAY, SUSPENSION (ML) NASAL DAILY
Qty: 16 G | Refills: 2 | Status: SHIPPED | OUTPATIENT
Start: 2022-02-08 | End: 2022-05-28

## 2022-02-08 RX ORDER — ARIPIPRAZOLE 2 MG/1
2 TABLET ORAL
COMMUNITY
Start: 2022-01-17 | End: 2022-05-24

## 2022-02-08 NOTE — PROGRESS NOTES
Assessment/Plan:         Problem List Items Addressed This Visit        Nervous and Auditory    Cerumen debris on tympanic membrane of right ear - Primary     Wax removed from her right ear without any complications  Relevant Orders    Ear cerumen removal    Bilateral hearing loss     Patient may need hearing aids  Her both ear canals are fully open and patent  She does not want to go for any hearing aid evaluation at present            Other    Nasal congestion     Try Flonase nasal spray  Relevant Medications    fluticasone (FLONASE) 50 mcg/act nasal spray            Subjective:      Patient ID: Pilo Butts is a 64 y o  female  1  Wax removal-patient did remove some wax at home by herself  No discharge  No fever or chills  No sore throat  No headache  2  Nasal congestion-she has been experiencing some congestion in her nose  No nasal discharge  Some trouble breathing by nose  No sinus pressure  No dizziness  3  Hearing loss- both ears even vacs is completely removed  No dizziness or tinnitus  No headache  No falls      The following portions of the patient's history were reviewed and updated as appropriate:   Past Medical History:  She has a past medical history of Anemia, Anxiety, Bipolar disorder ([address] ), Colon polyp, Disease of thyroid gland, Essential hypertension, Essential tremor, Fibromyalgia, primary, GERD (gastroesophageal reflux disease), Inflammatory polyarthropathy ([address] ), and Mammogram abnormal ,  _______________________________________________________________________  Medical Problems:  does not have any pertinent problems on file ,  _______________________________________________________________________  Past Surgical History:   has a past surgical history that includes Gastric bypass (07/01/2012); EGD AND COLONOSCOPY (01/01/2014); Carpal tunnel release (Bilateral); Ulnar nerve transposition (Right); Breast biopsy (Right, 2015);  Colonoscopy; Mammo needle localization right (all inc) (Right, 2/6/2012); and Mammo needle localization right (all inc) (Right, 2/6/2012)  ,  _______________________________________________________________________  Family History:  family history includes Leukemia in her other; No Known Problems in her father, maternal aunt, maternal aunt, maternal aunt, maternal grandfather, maternal grandmother, mother, paternal aunt, paternal grandfather, paternal grandmother, sister, sister, and sister; Stomach cancer in her maternal aunt  ,  _______________________________________________________________________  Social History:   reports that she quit smoking about 36 years ago  She has never used smokeless tobacco  She reports current alcohol use of about 4 0 standard drinks of alcohol per week  She reports that she does not use drugs  ,  _______________________________________________________________________  Allergies:  has No Known Allergies     _______________________________________________________________________  Current Outpatient Medications   Medication Sig Dispense Refill    clonazePAM (KlonoPIN) 1 mg tablet TK 3 TS PO Q NIGHT  0    ferrous gluconate (FERGON) 324 mg tablet Take 1 tablet (324 mg total) by mouth daily with breakfast 100 tablet 3    levothyroxine 112 mcg tablet TK 1 T PO QD  4    traZODone (DESYREL) 50 mg tablet TK 2 TO 3 TS PO Q NIGHT  0    ARIPiprazole (ABILIFY) 2 mg tablet Take 2 mg by mouth daily at bedtime (Patient not taking: Reported on 2/8/2022 )      bisacodyl (DULCOLAX) 5 mg EC tablet Take 2 tablets (10 mg total) by mouth once for 1 dose Please take with start of miralax prep as per office instructions   (Patient not taking: Reported on 12/14/2021 ) 2 tablet 0    DULoxetine (CYMBALTA) 30 mg delayed release capsule Take 1 capsule (30 mg total) by mouth daily (Patient not taking: Reported on 2/8/2022 ) 30 capsule 5    fluticasone (FLONASE) 50 mcg/act nasal spray 1 spray into each nostril daily 16 g 2    pantoprazole (PROTONIX) 40 mg tablet Take 1 tablet (40 mg total) by mouth daily (Patient not taking: Reported on 2/8/2022 ) 90 tablet 2    sucralfate (CARAFATE) 1 g/10 mL suspension Take 10 mL (1 g total) by mouth 4 (four) times a day (Patient not taking: Reported on 2/8/2022 ) 420 mL 3     No current facility-administered medications for this visit      _______________________________________________________________________  Review of Systems      Objective:  Vitals:    02/08/22 1135   BP: 130/70   BP Location: Left arm   Patient Position: Sitting   Cuff Size: Standard   Pulse: 87   Resp: 16   Temp: (!) 97 2 °F (36 2 °C)   TempSrc: Temporal   SpO2: 98%   Weight: 58 2 kg (128 lb 6 4 oz)   Height: 5' 4 5" (1 638 m)     Body mass index is 21 7 kg/m²  Physical Exam  Vitals and nursing note reviewed  Constitutional:       General: She is not in acute distress  Appearance: Normal appearance  She is well-developed  She is not ill-appearing  HENT:      Head: Atraumatic  Right Ear: There is impacted cerumen  Left Ear: There is no impacted cerumen  Eyes:      General:         Right eye: No discharge  Left eye: No discharge  Neck:      Thyroid: No thyromegaly  Cardiovascular:      Rate and Rhythm: Normal rate and regular rhythm  Heart sounds: Normal heart sounds  Pulmonary:      Effort: Pulmonary effort is normal       Breath sounds: Normal breath sounds  No wheezing  Chest:      Chest wall: No tenderness  Musculoskeletal:      Right lower leg: No edema  Left lower leg: No edema  Lymphadenopathy:      Cervical: No cervical adenopathy  Neurological:      Mental Status: She is alert and oriented to person, place, and time     Psychiatric:         Mood and Affect: Mood normal          Behavior: Behavior normal          Judgment: Judgment normal

## 2022-02-08 NOTE — ASSESSMENT & PLAN NOTE
Patient may need hearing aids  Her both ear canals are fully open and patent    She does not want to go for any hearing aid evaluation at present

## 2022-02-16 ENCOUNTER — APPOINTMENT (OUTPATIENT)
Dept: RADIOLOGY | Facility: CLINIC | Age: 77
End: 2022-02-16
Payer: COMMERCIAL

## 2022-02-16 ENCOUNTER — OFFICE VISIT (OUTPATIENT)
Dept: OBGYN CLINIC | Facility: CLINIC | Age: 77
End: 2022-02-16
Payer: COMMERCIAL

## 2022-02-16 VITALS
HEART RATE: 86 BPM | BODY MASS INDEX: 21.68 KG/M2 | WEIGHT: 127 LBS | HEIGHT: 64 IN | TEMPERATURE: 97 F | DIASTOLIC BLOOD PRESSURE: 70 MMHG | SYSTOLIC BLOOD PRESSURE: 130 MMHG

## 2022-02-16 DIAGNOSIS — M25.572 LEFT ANKLE PAIN, UNSPECIFIED CHRONICITY: ICD-10-CM

## 2022-02-16 DIAGNOSIS — M25.561 RIGHT KNEE PAIN, UNSPECIFIED CHRONICITY: ICD-10-CM

## 2022-02-16 DIAGNOSIS — M25.562 LEFT KNEE PAIN, UNSPECIFIED CHRONICITY: ICD-10-CM

## 2022-02-16 DIAGNOSIS — M25.571 RIGHT ANKLE PAIN, UNSPECIFIED CHRONICITY: ICD-10-CM

## 2022-02-16 DIAGNOSIS — M25.511 RIGHT SHOULDER PAIN, UNSPECIFIED CHRONICITY: ICD-10-CM

## 2022-02-16 DIAGNOSIS — M17.0 PRIMARY OSTEOARTHRITIS OF KNEES, BILATERAL: Primary | ICD-10-CM

## 2022-02-16 DIAGNOSIS — Z01.89 ENCOUNTER FOR LOWER EXTREMITY COMPARISON IMAGING STUDY: ICD-10-CM

## 2022-02-16 DIAGNOSIS — M75.81 TENDINITIS OF RIGHT ROTATOR CUFF: ICD-10-CM

## 2022-02-16 PROCEDURE — 73560 X-RAY EXAM OF KNEE 1 OR 2: CPT

## 2022-02-16 PROCEDURE — 73562 X-RAY EXAM OF KNEE 3: CPT

## 2022-02-16 PROCEDURE — 99204 OFFICE O/P NEW MOD 45 MIN: CPT | Performed by: ORTHOPAEDIC SURGERY

## 2022-02-16 PROCEDURE — 73610 X-RAY EXAM OF ANKLE: CPT

## 2022-02-16 NOTE — PROGRESS NOTES
Assessment/Plan:  1  Primary osteoarthritis of knees, bilateral  Injection Procedure Prior Authorization   2  Left knee pain, unspecified chronicity  CANCELED: XR knee 4+ vw left injury   3  Right knee pain, unspecified chronicity  CANCELED: XR knee 3 vw right non injury   4  Left ankle pain, unspecified chronicity  XR ankle 3+ vw left   5  Right ankle pain, unspecified chronicity  XR ankle 3+ vw right   6  Right shoulder pain, unspecified chronicity  Ambulatory Referral to Physical Therapy   7  Tendinitis of right rotator cuff  Ambulatory Referral to Physical Therapy       Scribe Attestation    I,:  Jessica Li am acting as a scribe while in the presence of the attending physician :       I,:  Alayna Pollard MD personally performed the services described in this documentation    as scribed in my presence :             Upon examination and review of X-rays of bilateral knees and ankles obtained today, Primo Mosher presents with bilateral severe lateral compartment osteoarthritis  She also presents with right shoulder pain limiting her range of motion, indicative of rotator cuff tendinitis  A script for physical therapy has been rare and to address her limited shoulder range of motion, and Visco injections have been ordered for authorization for bilateral knee osteoarthritis  Primo Mosher will follow-up for the injections with Grey Fuentes  Subjective:   Coleman Brown is a 64 y o  female who presents to the office today for initial evaluation of bilateral knee and ankle pain  She reports no recent injury, but a history of falls onto her knees over the past few years  Her activity levels have significantly decreased over the past few years due to increasing pain in her knees, left hip, and right shoulder  Primo Mosher reports a fall about 3 years ago that caused shoulder pain, which has been affecting her since  Review of Systems   Constitutional: Negative for chills and fever     HENT: Negative for ear pain and sore throat  Eyes: Negative for pain and visual disturbance  Respiratory: Negative for cough and shortness of breath  Cardiovascular: Negative for chest pain and palpitations  Gastrointestinal: Negative for abdominal pain and vomiting  Genitourinary: Negative for dysuria and hematuria  Musculoskeletal: Positive for arthralgias, joint swelling and myalgias  Negative for back pain  Skin: Negative for color change and rash  Neurological: Negative for seizures and syncope  All other systems reviewed and are negative          Past Medical History:   Diagnosis Date    Anemia     Anxiety     Bipolar disorder ([address] )     Colon polyp     Disease of thyroid gland     Essential hypertension     Essential tremor     Fibromyalgia, primary     GERD (gastroesophageal reflux disease)     Inflammatory polyarthropathy (HCC)     Mammogram abnormal        Past Surgical History:   Procedure Laterality Date    BREAST BIOPSY Right 2015    benign    CARPAL TUNNEL RELEASE Bilateral     COLONOSCOPY      EGD AND COLONOSCOPY  01/01/2014    GASTRIC BYPASS  07/01/2012    MAMMO NEEDLE LOCALIZATION RIGHT (ALL INC) Right 2/6/2012    MAMMO NEEDLE LOCALIZATION RIGHT (ALL INC) Right 2/6/2012    ULNAR NERVE TRANSPOSITION Right        Family History   Problem Relation Age of Onset    No Known Problems Mother     No Known Problems Father     No Known Problems Sister     No Known Problems Maternal Grandmother     No Known Problems Maternal Grandfather     No Known Problems Paternal Grandmother     No Known Problems Paternal Grandfather     No Known Problems Sister     No Known Problems Sister     Stomach cancer Maternal Aunt     No Known Problems Maternal Aunt     No Known Problems Maternal Aunt     No Known Problems Maternal Aunt     No Known Problems Paternal Aunt     Leukemia Other        Social History     Occupational History    Not on file   Tobacco Use    Smoking status: Former Smoker     Quit date: 12     Years since quittin 1    Smokeless tobacco: Never Used   Vaping Use    Vaping Use: Never used   Substance and Sexual Activity    Alcohol use: Yes     Alcohol/week: 4 0 standard drinks     Types: 4 Glasses of wine per week     Comment: rare    Drug use: Never    Sexual activity: Not on file         Current Outpatient Medications:     clonazePAM (KlonoPIN) 1 mg tablet, TK 3 TS PO Q NIGHT, Disp: , Rfl: 0    ferrous gluconate (FERGON) 324 mg tablet, Take 1 tablet (324 mg total) by mouth daily with breakfast, Disp: 100 tablet, Rfl: 3    fluticasone (FLONASE) 50 mcg/act nasal spray, 1 spray into each nostril daily, Disp: 16 g, Rfl: 2    levothyroxine 112 mcg tablet, TK 1 T PO QD, Disp: , Rfl: 4    traZODone (DESYREL) 50 mg tablet, TK 2 TO 3 TS PO Q NIGHT, Disp: , Rfl: 0    ARIPiprazole (ABILIFY) 2 mg tablet, Take 2 mg by mouth daily at bedtime (Patient not taking: Reported on 2022 ), Disp: , Rfl:     bisacodyl (DULCOLAX) 5 mg EC tablet, Take 2 tablets (10 mg total) by mouth once for 1 dose Please take with start of miralax prep as per office instructions  (Patient not taking: Reported on 2021 ), Disp: 2 tablet, Rfl: 0    DULoxetine (CYMBALTA) 30 mg delayed release capsule, Take 1 capsule (30 mg total) by mouth daily (Patient not taking: Reported on 2022 ), Disp: 30 capsule, Rfl: 5    pantoprazole (PROTONIX) 40 mg tablet, Take 1 tablet (40 mg total) by mouth daily (Patient not taking: Reported on 2022 ), Disp: 90 tablet, Rfl: 2    sucralfate (CARAFATE) 1 g/10 mL suspension, Take 10 mL (1 g total) by mouth 4 (four) times a day (Patient not taking: Reported on 2022 ), Disp: 420 mL, Rfl: 3    No Known Allergies    Objective:  Vitals:    22 1314   BP: 130/70   Pulse: 86   Temp: (!) 97 °F (36 1 °C)       Right Ankle Exam     Tenderness   The patient is experiencing no tenderness    Swelling: none    Range of Motion   Dorsiflexion:  20 normal   Plantar flexion:  20 normal     Muscle Strength   Dorsiflexion:  5/5  Plantar flexion:  5/5  Anterior tibial:  5/5  Peroneal muscle:  5/5    Tests   Anterior drawer: negative  Varus tilt: negative    Other   Sensation: normal  Pulse: present     Comments:  20 degrees subtalar arc range of motion       Left Ankle Exam     Tenderness   The patient is experiencing no tenderness  Swelling: none    Range of Motion   Dorsiflexion:  20 normal   Plantar flexion:  20 normal     Muscle Strength   Dorsiflexion:  5/5   Plantar flexion:  5/5   Anterior tibial:  5/5   Peroneal muscle:  5/5    Tests   Anterior drawer: negative  Varus tilt: negative    Other   Sensation: normal  Pulse: present    Comments:  20 degrees subtalar arc range of motion      Right Knee Exam     Tenderness   The patient is experiencing tenderness in the lateral joint line  Range of Motion   Extension: -5   Flexion:  120 normal     Tests   Varus: positive     Other   Erythema: absent  Sensation: normal  Pulse: present  Effusion: no effusion present    Comments:  No effusion      Left Knee Exam     Tenderness   The patient is experiencing tenderness in the lateral joint line  Range of Motion   Extension: -5   Flexion:  110 normal     Tests   Varus: positive     Other   Erythema: absent  Sensation: normal  Pulse: present  Effusion: effusion present    Comments:  1+ effusion      Right Shoulder Exam     Range of Motion   Active abduction: 110   External rotation: 70   Internal rotation 90 degrees: 20     Muscle Strength   Abduction: 4/5   Internal rotation: 5/5   External rotation: 5/5     Other   Sensation: normal  Pulse: present    Comments:  4+/5 abduction           Observations   Left Knee   Positive for effusion  Right Knee   Negative for effusion  Strength/Myotome Testing     Left Ankle/Foot   Dorsiflexion: 5  Plantar flexion: 5    Right Ankle/Foot   Dorsiflexion: 5  Plantar flexion: 5      Physical Exam  Vitals reviewed     HENT:      Head: Normocephalic and atraumatic  Eyes:      General:         Right eye: No discharge  Left eye: No discharge  Extraocular Movements: Extraocular movements intact  Conjunctiva/sclera: Conjunctivae normal    Cardiovascular:      Rate and Rhythm: Normal rate  Pulmonary:      Effort: Pulmonary effort is normal  No respiratory distress  Musculoskeletal:         General: Swelling and tenderness present  Cervical back: Normal range of motion and neck supple  Right knee: No effusion  Left knee: Effusion present  Comments: As noted in HPI   Skin:     General: Skin is warm and dry  Neurological:      Mental Status: She is alert and oriented to person, place, and time  I have personally reviewed pertinent films in PACS and my interpretation is as follows:    X-rays of bilateral knees obtained today were reviewed, which demonstrate severe lateral compartment osteoarthritis  X-rays of the left ankle obtained today were reviewed, which demonstrate no acute osseous deformity

## 2022-02-18 ENCOUNTER — TELEPHONE (OUTPATIENT)
Dept: OBGYN CLINIC | Facility: OTHER | Age: 77
End: 2022-02-18

## 2022-02-18 NOTE — TELEPHONE ENCOUNTER
Patient called back    Injections ar scheduled for 02-23 , 02-02 02-09 with Shani Durand     She would like to know if there is anything we can do for the pain in the mean time?     C/b # [phone]

## 2022-02-18 NOTE — TELEPHONE ENCOUNTER
Patient called in , she was wondering about her Injections  I advised they were authorized      She is going to talk to her brother In law, who drives her then call us back to schedule

## 2022-02-18 NOTE — TELEPHONE ENCOUNTER
I spoke to patient and she is taking one tylenol 650mg and one advil with food a couple times per day without relief  Advised heat/ice 20 on 20 off, tylenol 650mg 2 tabs every 8 hrs, OTC Voltaren gel due to stomach issues with advil  Patient will give this a try and see if it helps  Appt scheduled for Visco injections

## 2022-02-23 ENCOUNTER — PROCEDURE VISIT (OUTPATIENT)
Dept: OBGYN CLINIC | Facility: CLINIC | Age: 77
End: 2022-02-23
Payer: COMMERCIAL

## 2022-02-23 DIAGNOSIS — M17.0 PRIMARY OSTEOARTHRITIS OF KNEES, BILATERAL: Primary | ICD-10-CM

## 2022-02-23 PROCEDURE — 20610 DRAIN/INJ JOINT/BURSA W/O US: CPT | Performed by: PHYSICIAN ASSISTANT

## 2022-02-23 NOTE — PROGRESS NOTES
Assessment/Plan:  1  Primary osteoarthritis of knees, bilateral         Follow-up 1 week  Subjective:   Christy Beach is a 64 y o  female who presents today for gelsyn #1 bilateral knees  Review of Systems      Past Medical History:   Diagnosis Date    Anemia     Anxiety     Bipolar disorder ([address] )     Colon polyp     Disease of thyroid gland     Essential hypertension     Essential tremor     Fibromyalgia, primary     GERD (gastroesophageal reflux disease)     Inflammatory polyarthropathy (HCC)     Mammogram abnormal        Past Surgical History:   Procedure Laterality Date    BREAST BIOPSY Right 2015    benign    CARPAL TUNNEL RELEASE Bilateral     COLONOSCOPY      EGD AND COLONOSCOPY  2014    GASTRIC BYPASS  2012    MAMMO NEEDLE LOCALIZATION RIGHT (ALL [employer]) Right 2012    MAMMO NEEDLE LOCALIZATION RIGHT (ALL INC) Right 2012    ULNAR NERVE TRANSPOSITION Right        Family History   Problem Relation Age of Onset    No Known Problems Mother     No Known Problems Father     No Known Problems Sister     No Known Problems Maternal Grandmother     No Known Problems Maternal Grandfather     No Known Problems Paternal Grandmother     No Known Problems Paternal Grandfather     No Known Problems Sister     No Known Problems Sister     Stomach cancer Maternal Aunt     No Known Problems Maternal Aunt     No Known Problems Maternal Aunt     No Known Problems Maternal Aunt     No Known Problems Paternal Aunt     Leukemia Other        Social History     Occupational History    Not on file   Tobacco Use    Smoking status: Former Smoker     Quit date:      Years since quittin 1    Smokeless tobacco: Never Used   Vaping Use    Vaping Use: Never used   Substance and Sexual Activity    Alcohol use:  Yes     Alcohol/week: 4 0 standard drinks     Types: 4 Glasses of wine per week     Comment: rare    Drug use: Never    Sexual activity: Not on file Current Outpatient Medications:     ARIPiprazole (ABILIFY) 2 mg tablet, Take 2 mg by mouth daily at bedtime (Patient not taking: Reported on 2/8/2022 ), Disp: , Rfl:     bisacodyl (DULCOLAX) 5 mg EC tablet, Take 2 tablets (10 mg total) by mouth once for 1 dose Please take with start of miralax prep as per office instructions  (Patient not taking: Reported on 12/14/2021 ), Disp: 2 tablet, Rfl: 0    clonazePAM (KlonoPIN) 1 mg tablet, TK 3 TS PO Q NIGHT, Disp: , Rfl: 0    DULoxetine (CYMBALTA) 30 mg delayed release capsule, Take 1 capsule (30 mg total) by mouth daily (Patient not taking: Reported on 2/8/2022 ), Disp: 30 capsule, Rfl: 5    ferrous gluconate (FERGON) 324 mg tablet, Take 1 tablet (324 mg total) by mouth daily with breakfast, Disp: 100 tablet, Rfl: 3    fluticasone (FLONASE) 50 mcg/act nasal spray, 1 spray into each nostril daily, Disp: 16 g, Rfl: 2    levothyroxine 112 mcg tablet, TK 1 T PO QD, Disp: , Rfl: 4    pantoprazole (PROTONIX) 40 mg tablet, Take 1 tablet (40 mg total) by mouth daily (Patient not taking: Reported on 2/8/2022 ), Disp: 90 tablet, Rfl: 2    sucralfate (CARAFATE) 1 g/10 mL suspension, Take 10 mL (1 g total) by mouth 4 (four) times a day (Patient not taking: Reported on 2/8/2022 ), Disp: 420 mL, Rfl: 3    traZODone (DESYREL) 50 mg tablet, TK 2 TO 3 TS PO Q NIGHT, Disp: , Rfl: 0    No Known Allergies    Objective: There were no vitals filed for this visit      Ortho Exam    Physical Exam    Large joint arthrocentesis: L knee  Universal Protocol:  Risks and benefits: risks, benefits and alternatives were discussed  Consent given by: patient  Timeout called at: 2/23/2022 1:16 PM   Site marked: the operative site was marked  Supporting Documentation  Indications: pain   Procedure Details  Location: knee - L knee  Preparation: Patient was prepped and draped in the usual sterile fashion (Alcohol prep)  Needle size: 22 G  Ultrasound guidance: no  Approach: anterolateral  Medications administered: 2 mL sodium hyaluronate 16 8 MG/2ML    Patient tolerance: patient tolerated the procedure well with no immediate complications  Dressing:  Sterile dressing applied    Large joint arthrocentesis: R knee  Universal Protocol:  Risks and benefits: risks, benefits and alternatives were discussed  Consent given by: patient  Timeout called at: 2/23/2022 1:16 PM   Site marked: the operative site was marked  Supporting Documentation  Indications: pain   Procedure Details  Location: knee - R knee  Preparation: Patient was prepped and draped in the usual sterile fashion (Alcohol prep)  Needle size: 22 G  Ultrasound guidance: no  Approach: anterolateral  Medications administered: 2 mL sodium hyaluronate 16 8 MG/2ML    Patient tolerance: patient tolerated the procedure well with no immediate complications  Dressing:  Sterile dressing applied

## 2022-03-02 ENCOUNTER — PROCEDURE VISIT (OUTPATIENT)
Dept: OBGYN CLINIC | Facility: CLINIC | Age: 77
End: 2022-03-02
Payer: COMMERCIAL

## 2022-03-02 DIAGNOSIS — M17.0 PRIMARY OSTEOARTHRITIS OF KNEES, BILATERAL: Primary | ICD-10-CM

## 2022-03-02 PROCEDURE — 20610 DRAIN/INJ JOINT/BURSA W/O US: CPT | Performed by: ORTHOPAEDIC SURGERY

## 2022-03-02 NOTE — PROGRESS NOTES
Assessment/Plan:  1  Primary osteoarthritis of knees, bilateral  Large joint arthrocentesis: R knee    Large joint arthrocentesis: L knee       Follow-up in 1 week to complete the bilateral 3 shot series  Subjective:   Patient ID: Christy Beach is a 64 y o  female who presents to the office today for her 2nd gel send injection for her bilateral knee osteoarthritis        Review of Systems      Past Medical History:   Diagnosis Date    Anemia     Anxiety     Bipolar disorder ([address] )     Colon polyp     Disease of thyroid gland     Essential hypertension     Essential tremor     Fibromyalgia, primary     GERD (gastroesophageal reflux disease)     Inflammatory polyarthropathy (HCC)     Mammogram abnormal        Past Surgical History:   Procedure Laterality Date    BREAST BIOPSY Right 2015    benign    CARPAL TUNNEL RELEASE Bilateral     COLONOSCOPY      EGD AND COLONOSCOPY  2014    GASTRIC BYPASS  2012    MAMMO NEEDLE LOCALIZATION RIGHT (ALL INC) Right 2012    MAMMO NEEDLE LOCALIZATION RIGHT (ALL INC) Right 2012    ULNAR NERVE TRANSPOSITION Right        Family History   Problem Relation Age of Onset    No Known Problems Mother     No Known Problems Father     No Known Problems Sister     No Known Problems Maternal Grandmother     No Known Problems Maternal Grandfather     No Known Problems Paternal Grandmother     No Known Problems Paternal Grandfather     No Known Problems Sister     No Known Problems Sister     Stomach cancer Maternal Aunt     No Known Problems Maternal Aunt     No Known Problems Maternal Aunt     No Known Problems Maternal Aunt     No Known Problems Paternal Aunt     Leukemia Other        Social History     Occupational History    Not on file   Tobacco Use    Smoking status: Former Smoker     Quit date:      Years since quittin 1    Smokeless tobacco: Never Used   Vaping Use    Vaping Use: Never used   Substance and Sexual Activity    Alcohol use: Yes     Alcohol/week: 4 0 standard drinks     Types: 4 Glasses of wine per week     Comment: rare    Drug use: Never    Sexual activity: Not on file         Current Outpatient Medications:     clonazePAM (KlonoPIN) 1 mg tablet, TK 3 TS PO Q NIGHT, Disp: , Rfl: 0    ferrous gluconate (FERGON) 324 mg tablet, Take 1 tablet (324 mg total) by mouth daily with breakfast, Disp: 100 tablet, Rfl: 3    fluticasone (FLONASE) 50 mcg/act nasal spray, 1 spray into each nostril daily, Disp: 16 g, Rfl: 2    levothyroxine 112 mcg tablet, TK 1 T PO QD, Disp: , Rfl: 4    traZODone (DESYREL) 50 mg tablet, TK 2 TO 3 TS PO Q NIGHT, Disp: , Rfl: 0    ARIPiprazole (ABILIFY) 2 mg tablet, Take 2 mg by mouth daily at bedtime (Patient not taking: Reported on 2/8/2022 ), Disp: , Rfl:     bisacodyl (DULCOLAX) 5 mg EC tablet, Take 2 tablets (10 mg total) by mouth once for 1 dose Please take with start of miralax prep as per office instructions  (Patient not taking: Reported on 12/14/2021 ), Disp: 2 tablet, Rfl: 0    DULoxetine (CYMBALTA) 30 mg delayed release capsule, Take 1 capsule (30 mg total) by mouth daily (Patient not taking: Reported on 2/8/2022 ), Disp: 30 capsule, Rfl: 5    pantoprazole (PROTONIX) 40 mg tablet, Take 1 tablet (40 mg total) by mouth daily (Patient not taking: Reported on 2/8/2022 ), Disp: 90 tablet, Rfl: 2    sucralfate (CARAFATE) 1 g/10 mL suspension, Take 10 mL (1 g total) by mouth 4 (four) times a day (Patient not taking: Reported on 2/8/2022 ), Disp: 420 mL, Rfl: 3    No Known Allergies    Objective: There were no vitals filed for this visit  Ortho Exam    Physical Exam    Large joint arthrocentesis: R knee  Universal Protocol:  Consent: Verbal consent obtained    Risks and benefits: risks, benefits and alternatives were discussed  Consent given by: patient  Time out: Immediately prior to procedure a "time out" was called to verify the correct patient, procedure, equipment, support staff and site/side marked as required  Timeout called at: 3/2/2022 1:56 PM   Site marked: the operative site was marked  Supporting Documentation  Indications: pain   Procedure Details  Location: knee - R knee  Preparation: Patient was prepped and draped in the usual sterile fashion  Needle size: 22 G  Ultrasound guidance: no  Approach: anterolateral  Medications administered: 2 mL sodium hyaluronate 16 8 MG/2ML    Patient tolerance: patient tolerated the procedure well with no immediate complications  Dressing:  Sterile dressing applied    Large joint arthrocentesis: L knee  Universal Protocol:  Consent: Verbal consent obtained  Risks and benefits: risks, benefits and alternatives were discussed  Consent given by: patient  Time out: Immediately prior to procedure a "time out" was called to verify the correct patient, procedure, equipment, support staff and site/side marked as required    Timeout called at: 3/2/2022 1:57 PM   Site marked: the operative site was marked  Supporting Documentation  Indications: pain   Procedure Details  Location: knee - L knee  Preparation: Patient was prepped and draped in the usual sterile fashion  Needle size: 22 G  Ultrasound guidance: no  Approach: anterolateral  Medications administered: 2 mL sodium hyaluronate 16 8 MG/2ML    Patient tolerance: patient tolerated the procedure well with no immediate complications  Dressing:  Sterile dressing applied

## 2022-03-09 ENCOUNTER — PROCEDURE VISIT (OUTPATIENT)
Dept: OBGYN CLINIC | Facility: CLINIC | Age: 77
End: 2022-03-09
Payer: COMMERCIAL

## 2022-03-09 VITALS — TEMPERATURE: 97.6 F

## 2022-03-09 DIAGNOSIS — M17.0 PRIMARY OSTEOARTHRITIS OF KNEES, BILATERAL: Primary | ICD-10-CM

## 2022-03-09 PROCEDURE — 20610 DRAIN/INJ JOINT/BURSA W/O US: CPT | Performed by: PHYSICIAN ASSISTANT

## 2022-03-09 RX ORDER — NAPROXEN 500 MG/1
500 TABLET ORAL 2 TIMES DAILY WITH MEALS
Qty: 30 TABLET | Refills: 0 | Status: SHIPPED | OUTPATIENT
Start: 2022-03-09 | End: 2022-04-21 | Stop reason: SDUPTHER

## 2022-03-09 NOTE — PROGRESS NOTES
Assessment/Plan:  1  Primary osteoarthritis of knees, bilateral  naproxen (EC NAPROSYN) 500 MG EC tablet     Follow-up prn  Subjective:   Whitman Shone is a 64 y o  female who presents today for gelsyn #3 bilateral knees  Review of Systems      Past Medical History:   Diagnosis Date    Anemia     Anxiety     Bipolar disorder ([address] )     Colon polyp     Disease of thyroid gland     Essential hypertension     Essential tremor     Fibromyalgia, primary     GERD (gastroesophageal reflux disease)     Inflammatory polyarthropathy (HCC)     Mammogram abnormal        Past Surgical History:   Procedure Laterality Date    BREAST BIOPSY Right 2015    benign    CARPAL TUNNEL RELEASE Bilateral     COLONOSCOPY      EGD AND COLONOSCOPY  2014    GASTRIC BYPASS  2012    MAMMO NEEDLE LOCALIZATION RIGHT (ALL [employer]) Right 2012    MAMMO NEEDLE LOCALIZATION RIGHT (ALL INC) Right 2012    ULNAR NERVE TRANSPOSITION Right        Family History   Problem Relation Age of Onset    No Known Problems Mother     No Known Problems Father     No Known Problems Sister     No Known Problems Maternal Grandmother     No Known Problems Maternal Grandfather     No Known Problems Paternal Grandmother     No Known Problems Paternal Grandfather     No Known Problems Sister     No Known Problems Sister     Stomach cancer Maternal Aunt     No Known Problems Maternal Aunt     No Known Problems Maternal Aunt     No Known Problems Maternal Aunt     No Known Problems Paternal Aunt     Leukemia Other        Social History     Occupational History    Not on file   Tobacco Use    Smoking status: Former Smoker     Quit date:      Years since quittin 2    Smokeless tobacco: Never Used   Vaping Use    Vaping Use: Never used   Substance and Sexual Activity    Alcohol use:  Yes     Alcohol/week: 4 0 standard drinks     Types: 4 Glasses of wine per week     Comment: rare    Drug use: Never    Sexual activity: Not on file         Current Outpatient Medications:     ARIPiprazole (ABILIFY) 2 mg tablet, Take 2 mg by mouth daily at bedtime (Patient not taking: Reported on 2/8/2022 ), Disp: , Rfl:     bisacodyl (DULCOLAX) 5 mg EC tablet, Take 2 tablets (10 mg total) by mouth once for 1 dose Please take with start of miralax prep as per office instructions   (Patient not taking: Reported on 12/14/2021 ), Disp: 2 tablet, Rfl: 0    clonazePAM (KlonoPIN) 1 mg tablet, TK 3 TS PO Q NIGHT, Disp: , Rfl: 0    DULoxetine (CYMBALTA) 30 mg delayed release capsule, Take 1 capsule (30 mg total) by mouth daily (Patient not taking: Reported on 2/8/2022 ), Disp: 30 capsule, Rfl: 5    ferrous gluconate (FERGON) 324 mg tablet, Take 1 tablet (324 mg total) by mouth daily with breakfast, Disp: 100 tablet, Rfl: 3    fluticasone (FLONASE) 50 mcg/act nasal spray, 1 spray into each nostril daily, Disp: 16 g, Rfl: 2    levothyroxine 112 mcg tablet, TK 1 T PO QD, Disp: , Rfl: 4    naproxen (EC NAPROSYN) 500 MG EC tablet, Take 1 tablet (500 mg total) by mouth 2 (two) times a day with meals, Disp: 30 tablet, Rfl: 0    pantoprazole (PROTONIX) 40 mg tablet, Take 1 tablet (40 mg total) by mouth daily (Patient not taking: Reported on 2/8/2022 ), Disp: 90 tablet, Rfl: 2    sucralfate (CARAFATE) 1 g/10 mL suspension, Take 10 mL (1 g total) by mouth 4 (four) times a day (Patient not taking: Reported on 2/8/2022 ), Disp: 420 mL, Rfl: 3    traZODone (DESYREL) 50 mg tablet, TK 2 TO 3 TS PO Q NIGHT, Disp: , Rfl: 0    No Known Allergies    Objective:  Vitals:    03/09/22 1401   Temp: 97 6 °F (36 4 °C)       Ortho Exam    Physical Exam    Large joint arthrocentesis: L knee  Universal Protocol:  Risks and benefits: risks, benefits and alternatives were discussed  Consent given by: patient  Timeout called at: 3/9/2022 2:18 PM   Site marked: the operative site was marked  Supporting Documentation  Indications: pain   Procedure Details  Location: knee - L knee  Preparation: Patient was prepped and draped in the usual sterile fashion (Alcohol prep)  Needle size: 22 G  Ultrasound guidance: no  Approach: anterolateral  Medications administered: 2 mL sodium hyaluronate 16 8 MG/2ML    Patient tolerance: patient tolerated the procedure well with no immediate complications  Dressing:  Sterile dressing applied    Large joint arthrocentesis: R knee  Universal Protocol:  Risks and benefits: risks, benefits and alternatives were discussed  Consent given by: patient  Timeout called at: 3/9/2022 2:18 PM   Site marked: the operative site was marked  Supporting Documentation  Indications: pain   Procedure Details  Location: knee - R knee  Preparation: Patient was prepped and draped in the usual sterile fashion (Alcohol prep)  Needle size: 22 G  Ultrasound guidance: no  Approach: anterolateral  Medications administered: 2 mL sodium hyaluronate 16 8 MG/2ML    Patient tolerance: patient tolerated the procedure well with no immediate complications  Dressing:  Sterile dressing applied

## 2022-04-21 ENCOUNTER — HOSPITAL ENCOUNTER (OUTPATIENT)
Dept: RADIOLOGY | Facility: HOSPITAL | Age: 77
Discharge: HOME | End: 2022-04-21
Payer: COMMERCIAL

## 2022-04-21 ENCOUNTER — APPOINTMENT (OUTPATIENT)
Dept: RADIOLOGY | Facility: CLINIC | Age: 77
End: 2022-04-21
Payer: COMMERCIAL

## 2022-04-21 ENCOUNTER — OFFICE VISIT (OUTPATIENT)
Dept: OBGYN CLINIC | Facility: CLINIC | Age: 77
End: 2022-04-21
Payer: COMMERCIAL

## 2022-04-21 VITALS
HEIGHT: 64 IN | BODY MASS INDEX: 20.79 KG/M2 | SYSTOLIC BLOOD PRESSURE: 110 MMHG | WEIGHT: 121.8 LBS | HEART RATE: 78 BPM | TEMPERATURE: 98 F | DIASTOLIC BLOOD PRESSURE: 88 MMHG

## 2022-04-21 DIAGNOSIS — M25.552 PAIN IN LEFT HIP: Primary | ICD-10-CM

## 2022-04-21 DIAGNOSIS — M25.552 PAIN IN LEFT HIP: ICD-10-CM

## 2022-04-21 DIAGNOSIS — M17.0 PRIMARY OSTEOARTHRITIS OF KNEES, BILATERAL: ICD-10-CM

## 2022-04-21 PROCEDURE — G1004 CDSM NDSC: HCPCS

## 2022-04-21 PROCEDURE — 73700 CT LOWER EXTREMITY W/O DYE: CPT

## 2022-04-21 PROCEDURE — 73552 X-RAY EXAM OF FEMUR 2/>: CPT

## 2022-04-21 PROCEDURE — 99214 OFFICE O/P EST MOD 30 MIN: CPT | Performed by: PHYSICIAN ASSISTANT

## 2022-04-21 RX ORDER — OFLOXACIN 3 MG/ML
SOLUTION AURICULAR (OTIC)
COMMUNITY
Start: 2022-03-08 | End: 2022-05-28

## 2022-04-21 RX ORDER — NAPROXEN 500 MG/1
500 TABLET ORAL 2 TIMES DAILY WITH MEALS
Qty: 30 TABLET | Refills: 0 | Status: SHIPPED | OUTPATIENT
Start: 2022-04-21 | End: 2022-05-28

## 2022-04-21 NOTE — PROGRESS NOTES
Assessment/Plan:  1  Pain in left hip  XR femur 2 vw left      Given the patient's traumatic full 2 weeks ago, and her ongoing hip pain, I do have concern about a subtle fracture  I am ordering a stat CT scan to evaluate for this  If negative, I do advise physical therapy for the left lower extremity  I did renew the patient's naproxen, as she notes this had been helping her pain some  We will call her with these stat results and discuss them over the phone  I did advise her to follow-up with her primary care doctor as she did hit her head at the time of her injury  Subjective:   Nurys Stanley is a 64 y o  female who presents today for evaluation of her left lower extremity  She notes she sustained a fall on her stairs about 2 weeks ago injuring the left leg  She is not been able to ambulate very well or much since that time  She notes pain about the left posterior hip and occasionally about the groin  This radiates down the leg toward the knee  She also notes pain about the lateral knee  She does have severe lateral compartment arthritis, and we just recently finished Euflexxa injections for her  She notes good sensation of the left lower extremity  She also notes that she did hit her head, but has not seen her primary care doctor for this  Review of Systems   Constitutional: Negative  Negative for chills and fever  HENT: Negative  Negative for ear pain and sore throat  Eyes: Negative  Negative for pain and redness  Respiratory: Negative  Negative for shortness of breath and wheezing  Cardiovascular: Negative for chest pain and palpitations  Gastrointestinal: Negative  Negative for abdominal pain and blood in stool  Endocrine: Negative  Negative for polydipsia and polyuria  Genitourinary: Negative  Negative for difficulty urinating and dysuria  Musculoskeletal:        As noted in HPI   Skin: Negative  Negative for pallor and rash  Neurological: Negative  Negative for dizziness and numbness  Hematological: Negative  Negative for adenopathy  Does not bruise/bleed easily  Psychiatric/Behavioral: Negative  Negative for confusion and suicidal ideas  Past Medical History:   Diagnosis Date    Anemia     Anxiety     Bipolar disorder ([address] )     Colon polyp     Disease of thyroid gland     Essential hypertension     Essential tremor     Fibromyalgia, primary     GERD (gastroesophageal reflux disease)     Inflammatory polyarthropathy (HCC)     Mammogram abnormal        Past Surgical History:   Procedure Laterality Date    BREAST BIOPSY Right 2015    benign    CARPAL TUNNEL RELEASE Bilateral     COLONOSCOPY      EGD AND COLONOSCOPY  2014    GASTRIC BYPASS  2012    MAMMO NEEDLE LOCALIZATION RIGHT ([employer]) Right 2012    MAMMO NEEDLE LOCALIZATION RIGHT (ALL INC) Right 2012    ULNAR NERVE TRANSPOSITION Right        Family History   Problem Relation Age of Onset    No Known Problems Mother     No Known Problems Father     No Known Problems Sister     No Known Problems Maternal Grandmother     No Known Problems Maternal Grandfather     No Known Problems Paternal Grandmother     No Known Problems Paternal Grandfather     No Known Problems Sister     No Known Problems Sister     Stomach cancer Maternal Aunt     No Known Problems Maternal Aunt     No Known Problems Maternal Aunt     No Known Problems Maternal Aunt     No Known Problems Paternal Aunt     Leukemia Other        Social History     Occupational History    Not on file   Tobacco Use    Smoking status: Former Smoker     Quit date:      Years since quittin 3    Smokeless tobacco: Never Used   Vaping Use    Vaping Use: Never used   Substance and Sexual Activity    Alcohol use:  Yes     Alcohol/week: 4 0 standard drinks     Types: 4 Glasses of wine per week     Comment: rare    Drug use: Never    Sexual activity: Not on file         Current Outpatient Medications:     clonazePAM (KlonoPIN) 1 mg tablet, TK 3 TS PO Q NIGHT, Disp: , Rfl: 0    DULoxetine (CYMBALTA) 30 mg delayed release capsule, Take 1 capsule (30 mg total) by mouth daily, Disp: 30 capsule, Rfl: 5    ferrous gluconate (FERGON) 324 mg tablet, Take 1 tablet (324 mg total) by mouth daily with breakfast, Disp: 100 tablet, Rfl: 3    fluticasone (FLONASE) 50 mcg/act nasal spray, 1 spray into each nostril daily, Disp: 16 g, Rfl: 2    levothyroxine 112 mcg tablet, TK 1 T PO QD, Disp: , Rfl: 4    ofloxacin (FLOXIN) 0 3 % otic solution, INSTILL 5 DROPS IN BOTH EARS TWICE DAILY FOR 3 DAYS, Disp: , Rfl:     pantoprazole (PROTONIX) 40 mg tablet, Take 1 tablet (40 mg total) by mouth daily, Disp: 90 tablet, Rfl: 2    sucralfate (CARAFATE) 1 g/10 mL suspension, Take 10 mL (1 g total) by mouth 4 (four) times a day, Disp: 420 mL, Rfl: 3    traZODone (DESYREL) 50 mg tablet, TK 2 TO 3 TS PO Q NIGHT, Disp: , Rfl: 0    ARIPiprazole (ABILIFY) 2 mg tablet, Take 2 mg by mouth daily at bedtime (Patient not taking: Reported on 2/8/2022 ), Disp: , Rfl:     bisacodyl (DULCOLAX) 5 mg EC tablet, Take 2 tablets (10 mg total) by mouth once for 1 dose Please take with start of miralax prep as per office instructions  (Patient not taking: Reported on 12/14/2021 ), Disp: 2 tablet, Rfl: 0    naproxen (EC NAPROSYN) 500 MG EC tablet, Take 1 tablet (500 mg total) by mouth 2 (two) times a day with meals (Patient not taking: Reported on 4/21/2022 ), Disp: 30 tablet, Rfl: 0    No Known Allergies    Objective:  Vitals:    04/21/22 1332   BP: 110/88   Pulse: 78   Temp: 98 °F (36 7 °C)       Left Knee Exam     Tenderness   The patient is experiencing tenderness in the lateral joint line      Range of Motion   Left knee extension: -3    Flexion: 90     Other   Erythema: absent  Sensation: normal  Pulse: present  Swelling: none  Effusion: no effusion present      Left Hip Exam     Tenderness   The patient is experiencing no tenderness  Range of Motion   External rotation: 20 (with pain)   Internal rotation: 10 (with pain)     Muscle Strength   Flexion: 4/5     Other   Erythema: absent  Sensation: normal  Pulse: present          Observations   Left Knee   Negative for effusion  Physical Exam  Constitutional:       General: She is not in acute distress  Appearance: She is well-developed  HENT:      Head: Normocephalic and atraumatic  Eyes:      General: No scleral icterus  Conjunctiva/sclera: Conjunctivae normal    Neck:      Vascular: No JVD  Cardiovascular:      Rate and Rhythm: Normal rate  Pulmonary:      Effort: Pulmonary effort is normal  No respiratory distress  Musculoskeletal:      Left knee: No effusion  Skin:     General: Skin is warm  Neurological:      Mental Status: She is alert and oriented to person, place, and time  Coordination: Coordination normal          I have personally reviewed pertinent films in PACS and my interpretation is as follows:  X-rays left femur:  Arthritis of the left hip  Lateral compartment arthritis of the knee  mild intertrochanteric lucency

## 2022-04-27 ENCOUNTER — TELEPHONE (OUTPATIENT)
Dept: OBGYN CLINIC | Facility: OTHER | Age: 77
End: 2022-04-27

## 2022-04-27 NOTE — TELEPHONE ENCOUNTER
Patient called in looking for the CT Results of her Left Hip  Also she is looking around for a new pcp, she is asking for some recommendations        C/b # [phone]

## 2022-04-28 NOTE — TELEPHONE ENCOUNTER
Attempted to contact patient, no answer no vm  When patient returns calls please make every attempt to connect with office

## 2022-04-28 NOTE — TELEPHONE ENCOUNTER
Patient calling back in to see if there were any answers to her questions  Tried to reach triage nurse to release CT scan results  Provided PCP name to patient        # [phone]

## 2022-04-28 NOTE — TELEPHONE ENCOUNTER
Attempted to call patient  Her voicemail box has not been set up yet  No answer, no machine  I will attempted to try her again later  Unable to leave a message

## 2022-05-17 ENCOUNTER — TELEPHONE (OUTPATIENT)
Dept: OTHER | Facility: OTHER | Age: 77
End: 2022-05-17

## 2022-05-17 NOTE — TELEPHONE ENCOUNTER
Patient called and canceled her appointment because she is not feeling well and she will call back the office to reschedule her appointment

## 2022-05-24 ENCOUNTER — APPOINTMENT (EMERGENCY)
Dept: RADIOLOGY | Facility: HOSPITAL | Age: 77
DRG: 381 | End: 2022-05-24
Payer: COMMERCIAL

## 2022-05-24 ENCOUNTER — HOSPITAL ENCOUNTER (INPATIENT)
Facility: HOSPITAL | Age: 77
LOS: 3 days | Discharge: HOME HEALTH | DRG: 381 | End: 2022-05-28
Attending: EMERGENCY MEDICINE | Admitting: FAMILY MEDICINE
Payer: COMMERCIAL

## 2022-05-24 DIAGNOSIS — D64.9 ANEMIA: ICD-10-CM

## 2022-05-24 DIAGNOSIS — D64.9 SYMPTOMATIC ANEMIA: Primary | ICD-10-CM

## 2022-05-24 DIAGNOSIS — R79.89 ELEVATED BRAIN NATRIURETIC PEPTIDE (BNP) LEVEL: ICD-10-CM

## 2022-05-24 PROBLEM — R93.89 ABNORMAL CT SCAN: Status: ACTIVE | Noted: 2022-05-24

## 2022-05-24 PROBLEM — R53.1 GENERALIZED WEAKNESS: Status: ACTIVE | Noted: 2022-05-24

## 2022-05-24 PROBLEM — E87.1 HYPONATREMIA: Status: ACTIVE | Noted: 2022-05-24

## 2022-05-24 PROBLEM — R06.00 DYSPNEA ON EXERTION: Status: ACTIVE | Noted: 2022-05-24

## 2022-05-24 PROBLEM — Z98.84 HISTORY OF ROUX-EN-Y GASTRIC BYPASS: Status: ACTIVE | Noted: 2022-05-24

## 2022-05-24 PROBLEM — R06.09 DYSPNEA ON EXERTION: Status: ACTIVE | Noted: 2022-05-24

## 2022-05-24 LAB
2HR DELTA HS TROPONIN: 18 NG/L
4HR DELTA HS TROPONIN: 28 NG/L
ABO GROUP BLD BPU: NORMAL
ABO GROUP BLD: NORMAL
ABO GROUP BLD: NORMAL
ALBUMIN SERPL BCP-MCNC: 2.7 G/DL (ref 3.5–5)
ALP SERPL-CCNC: 71 U/L (ref 46–116)
ALT SERPL W P-5'-P-CCNC: 29 U/L (ref 12–78)
AMORPH URATE CRY URNS QL MICRO: NORMAL /HPF
ANION GAP SERPL CALCULATED.3IONS-SCNC: 8 MMOL/L (ref 4–13)
AST SERPL W P-5'-P-CCNC: 32 U/L (ref 5–45)
BACTERIA UR QL AUTO: NORMAL /HPF
BASOPHILS # BLD AUTO: 0.05 THOUSANDS/ΜL (ref 0–0.1)
BASOPHILS NFR BLD AUTO: 1 % (ref 0–1)
BILIRUB SERPL-MCNC: 0.37 MG/DL (ref 0.2–1)
BILIRUB UR QL STRIP: NEGATIVE
BLD GP AB SCN SERPL QL: NEGATIVE
BPU ID: NORMAL
BUN SERPL-MCNC: 15 MG/DL (ref 5–25)
CALCIUM ALBUM COR SERPL-MCNC: 9.9 MG/DL (ref 8.3–10.1)
CALCIUM SERPL-MCNC: 8.9 MG/DL (ref 8.3–10.1)
CARDIAC TROPONIN I PNL SERPL HS: 45 NG/L
CARDIAC TROPONIN I PNL SERPL HS: 63 NG/L
CARDIAC TROPONIN I PNL SERPL HS: 73 NG/L
CHLORIDE SERPL-SCNC: 100 MMOL/L (ref 100–108)
CLARITY UR: CLEAR
CO2 SERPL-SCNC: 23 MMOL/L (ref 21–32)
COLOR UR: ABNORMAL
CREAT SERPL-MCNC: 1.14 MG/DL (ref 0.6–1.3)
CROSSMATCH: NORMAL
D DIMER PPP FEU-MCNC: 2.45 UG/ML FEU
EOSINOPHIL # BLD AUTO: 0.64 THOUSAND/ΜL (ref 0–0.61)
EOSINOPHIL NFR BLD AUTO: 7 % (ref 0–6)
ERYTHROCYTE [DISTWIDTH] IN BLOOD BY AUTOMATED COUNT: 14.9 % (ref 11.6–15.1)
GFR SERPL CREATININE-BSD FRML MDRD: 46 ML/MIN/1.73SQ M
GLUCOSE SERPL-MCNC: 95 MG/DL (ref 65–140)
GLUCOSE UR STRIP-MCNC: NEGATIVE MG/DL
HCT VFR BLD AUTO: 24.5 % (ref 34.8–46.1)
HGB BLD-MCNC: 7.8 G/DL (ref 11.5–15.4)
HGB UR QL STRIP.AUTO: ABNORMAL
IMM GRANULOCYTES # BLD AUTO: 0.07 THOUSAND/UL (ref 0–0.2)
IMM GRANULOCYTES NFR BLD AUTO: 1 % (ref 0–2)
KETONES UR STRIP-MCNC: NEGATIVE MG/DL
LEUKOCYTE ESTERASE UR QL STRIP: NEGATIVE
LYMPHOCYTES # BLD AUTO: 2.07 THOUSANDS/ΜL (ref 0.6–4.47)
LYMPHOCYTES NFR BLD AUTO: 22 % (ref 14–44)
MAGNESIUM SERPL-MCNC: 2.2 MG/DL (ref 1.6–2.6)
MCH RBC QN AUTO: 31.5 PG (ref 26.8–34.3)
MCHC RBC AUTO-ENTMCNC: 31.8 G/DL (ref 31.4–37.4)
MCV RBC AUTO: 99 FL (ref 82–98)
MONOCYTES # BLD AUTO: 0.94 THOUSAND/ΜL (ref 0.17–1.22)
MONOCYTES NFR BLD AUTO: 10 % (ref 4–12)
NEUTROPHILS # BLD AUTO: 5.6 THOUSANDS/ΜL (ref 1.85–7.62)
NEUTS SEG NFR BLD AUTO: 59 % (ref 43–75)
NITRITE UR QL STRIP: NEGATIVE
NON-SQ EPI CELLS URNS QL MICRO: NORMAL /HPF
NRBC BLD AUTO-RTO: 0 /100 WBCS
NT-PROBNP SERPL-MCNC: 1527 PG/ML
PH UR STRIP.AUTO: 6 [PH]
PLATELET # BLD AUTO: 676 THOUSANDS/UL (ref 149–390)
PMV BLD AUTO: 9.5 FL (ref 8.9–12.7)
POTASSIUM SERPL-SCNC: 4.5 MMOL/L (ref 3.5–5.3)
PROT SERPL-MCNC: 7.2 G/DL (ref 6.4–8.2)
PROT UR STRIP-MCNC: NEGATIVE MG/DL
RBC # BLD AUTO: 2.48 MILLION/UL (ref 3.81–5.12)
RBC #/AREA URNS AUTO: NORMAL /HPF
RH BLD: POSITIVE
RH BLD: POSITIVE
SODIUM SERPL-SCNC: 131 MMOL/L (ref 136–145)
SP GR UR STRIP.AUTO: <=1.005 (ref 1–1.03)
SPECIMEN EXPIRATION DATE: NORMAL
TSH SERPL DL<=0.05 MIU/L-ACNC: 3.49 UIU/ML (ref 0.45–4.5)
UNIT DISPENSE STATUS: NORMAL
UNIT PRODUCT CODE: NORMAL
UNIT PRODUCT VOLUME: 362 ML
UNIT RH: NORMAL
URATE SERPL-MCNC: 4.2 MG/DL (ref 2–6.8)
UROBILINOGEN UR QL STRIP.AUTO: 0.2 E.U./DL
WBC # BLD AUTO: 9.37 THOUSAND/UL (ref 4.31–10.16)
WBC #/AREA URNS AUTO: NORMAL /HPF

## 2022-05-24 PROCEDURE — 82728 ASSAY OF FERRITIN: CPT | Performed by: NURSE PRACTITIONER

## 2022-05-24 PROCEDURE — 82746 ASSAY OF FOLIC ACID SERUM: CPT | Performed by: NURSE PRACTITIONER

## 2022-05-24 PROCEDURE — 85025 COMPLETE CBC W/AUTO DIFF WBC: CPT | Performed by: EMERGENCY MEDICINE

## 2022-05-24 PROCEDURE — 83540 ASSAY OF IRON: CPT | Performed by: NURSE PRACTITIONER

## 2022-05-24 PROCEDURE — 86920 COMPATIBILITY TEST SPIN: CPT

## 2022-05-24 PROCEDURE — 86850 RBC ANTIBODY SCREEN: CPT | Performed by: EMERGENCY MEDICINE

## 2022-05-24 PROCEDURE — 99285 EMERGENCY DEPT VISIT HI MDM: CPT

## 2022-05-24 PROCEDURE — 84484 ASSAY OF TROPONIN QUANT: CPT | Performed by: NURSE PRACTITIONER

## 2022-05-24 PROCEDURE — 30233N1 TRANSFUSION OF NONAUTOLOGOUS RED BLOOD CELLS INTO PERIPHERAL VEIN, PERCUTANEOUS APPROACH: ICD-10-PCS | Performed by: INTERNAL MEDICINE

## 2022-05-24 PROCEDURE — G1004 CDSM NDSC: HCPCS

## 2022-05-24 PROCEDURE — P9016 RBC LEUKOCYTES REDUCED: HCPCS

## 2022-05-24 PROCEDURE — 71275 CT ANGIOGRAPHY CHEST: CPT

## 2022-05-24 PROCEDURE — 85379 FIBRIN DEGRADATION QUANT: CPT | Performed by: EMERGENCY MEDICINE

## 2022-05-24 PROCEDURE — 84443 ASSAY THYROID STIM HORMONE: CPT | Performed by: EMERGENCY MEDICINE

## 2022-05-24 PROCEDURE — 83735 ASSAY OF MAGNESIUM: CPT | Performed by: EMERGENCY MEDICINE

## 2022-05-24 PROCEDURE — 36415 COLL VENOUS BLD VENIPUNCTURE: CPT | Performed by: EMERGENCY MEDICINE

## 2022-05-24 PROCEDURE — 83935 ASSAY OF URINE OSMOLALITY: CPT | Performed by: NURSE PRACTITIONER

## 2022-05-24 PROCEDURE — 70450 CT HEAD/BRAIN W/O DYE: CPT

## 2022-05-24 PROCEDURE — 83930 ASSAY OF BLOOD OSMOLALITY: CPT | Performed by: NURSE PRACTITIONER

## 2022-05-24 PROCEDURE — 86900 BLOOD TYPING SEROLOGIC ABO: CPT | Performed by: EMERGENCY MEDICINE

## 2022-05-24 PROCEDURE — 93005 ELECTROCARDIOGRAM TRACING: CPT

## 2022-05-24 PROCEDURE — 81001 URINALYSIS AUTO W/SCOPE: CPT | Performed by: EMERGENCY MEDICINE

## 2022-05-24 PROCEDURE — 83550 IRON BINDING TEST: CPT | Performed by: NURSE PRACTITIONER

## 2022-05-24 PROCEDURE — 80053 COMPREHEN METABOLIC PANEL: CPT | Performed by: EMERGENCY MEDICINE

## 2022-05-24 PROCEDURE — 82607 VITAMIN B-12: CPT | Performed by: NURSE PRACTITIONER

## 2022-05-24 PROCEDURE — 99285 EMERGENCY DEPT VISIT HI MDM: CPT | Performed by: EMERGENCY MEDICINE

## 2022-05-24 PROCEDURE — 71250 CT THORAX DX C-: CPT

## 2022-05-24 PROCEDURE — 99219 PR INITIAL OBSERVATION CARE/DAY 50 MINUTES: CPT | Performed by: NURSE PRACTITIONER

## 2022-05-24 PROCEDURE — 84484 ASSAY OF TROPONIN QUANT: CPT | Performed by: EMERGENCY MEDICINE

## 2022-05-24 PROCEDURE — 84300 ASSAY OF URINE SODIUM: CPT | Performed by: NURSE PRACTITIONER

## 2022-05-24 PROCEDURE — 74176 CT ABD & PELVIS W/O CONTRAST: CPT

## 2022-05-24 PROCEDURE — 83880 ASSAY OF NATRIURETIC PEPTIDE: CPT | Performed by: EMERGENCY MEDICINE

## 2022-05-24 PROCEDURE — 84550 ASSAY OF BLOOD/URIC ACID: CPT | Performed by: NURSE PRACTITIONER

## 2022-05-24 PROCEDURE — 72125 CT NECK SPINE W/O DYE: CPT

## 2022-05-24 PROCEDURE — 86901 BLOOD TYPING SEROLOGIC RH(D): CPT | Performed by: EMERGENCY MEDICINE

## 2022-05-24 RX ORDER — SODIUM CHLORIDE 9 MG/ML
50 INJECTION, SOLUTION INTRAVENOUS CONTINUOUS
Status: DISCONTINUED | OUTPATIENT
Start: 2022-05-24 | End: 2022-05-27

## 2022-05-24 RX ORDER — DOXYCYCLINE HYCLATE 50 MG/1
324 CAPSULE, GELATIN COATED ORAL
Status: DISCONTINUED | OUTPATIENT
Start: 2022-05-25 | End: 2022-05-25

## 2022-05-24 RX ORDER — ACETAMINOPHEN 325 MG/1
650 TABLET ORAL EVERY 6 HOURS PRN
Status: DISCONTINUED | OUTPATIENT
Start: 2022-05-24 | End: 2022-05-28 | Stop reason: HOSPADM

## 2022-05-24 RX ORDER — DIPHENHYDRAMINE HCL 25 MG
25 TABLET ORAL ONCE
Status: COMPLETED | OUTPATIENT
Start: 2022-05-24 | End: 2022-05-24

## 2022-05-24 RX ORDER — LEVOTHYROXINE SODIUM 112 UG/1
112 TABLET ORAL
Status: DISCONTINUED | OUTPATIENT
Start: 2022-05-25 | End: 2022-05-28 | Stop reason: HOSPADM

## 2022-05-24 RX ORDER — TRAZODONE HYDROCHLORIDE 50 MG/1
150 TABLET ORAL
Status: DISCONTINUED | OUTPATIENT
Start: 2022-05-24 | End: 2022-05-28 | Stop reason: HOSPADM

## 2022-05-24 RX ORDER — CLONAZEPAM 1 MG/1
3 TABLET ORAL
Status: DISCONTINUED | OUTPATIENT
Start: 2022-05-24 | End: 2022-05-28 | Stop reason: HOSPADM

## 2022-05-24 RX ORDER — ACETAMINOPHEN 325 MG/1
650 TABLET ORAL ONCE
Status: COMPLETED | OUTPATIENT
Start: 2022-05-24 | End: 2022-05-24

## 2022-05-24 RX ORDER — ONDANSETRON 2 MG/ML
4 INJECTION INTRAMUSCULAR; INTRAVENOUS EVERY 6 HOURS PRN
Status: DISCONTINUED | OUTPATIENT
Start: 2022-05-24 | End: 2022-05-28 | Stop reason: HOSPADM

## 2022-05-24 RX ADMIN — DIPHENHYDRAMINE HYDROCHLORIDE 25 MG: 25 TABLET ORAL at 23:20

## 2022-05-24 RX ADMIN — ACETAMINOPHEN 650 MG: 325 TABLET ORAL at 23:20

## 2022-05-24 RX ADMIN — TRAZODONE HYDROCHLORIDE 150 MG: 50 TABLET ORAL at 21:13

## 2022-05-24 RX ADMIN — CLONAZEPAM 3 MG: 1 TABLET ORAL at 21:13

## 2022-05-24 RX ADMIN — SODIUM CHLORIDE 50 ML/HR: 0.9 INJECTION, SOLUTION INTRAVENOUS at 21:12

## 2022-05-24 RX ADMIN — IOHEXOL 70 ML: 350 INJECTION, SOLUTION INTRAVENOUS at 18:09

## 2022-05-24 NOTE — ED PROVIDER NOTES
History  Chief Complaint   Patient presents with    Shortness of Breath     Pt states SOB for months and has been losing wt  Patient here with complaint of increasing shortness of breath for the past few months  She states the symptoms worsen with exertion, and she has to stop for a few minutes when going up her steps  She also c/o persistent bony pain - which she states was diagnosed as arthritis  She has a history of anxiety, bipolar disorder, hypertension, fibromyalgia, hypothyroidism  Patient is tachypneic the time of initial evaluation  History provided by:  Patient   used: No    Shortness of Breath  Severity:  Moderate  Onset quality:  Gradual  Timing:  Constant  Progression:  Worsening  Chronicity:  New  Context: activity    Relieved by:  Nothing  Worsened by: Activity, movement and exertion  Ineffective treatments:  None tried  Associated symptoms: no abdominal pain, no chest pain, no cough, no fever, no neck pain and no vomiting        Prior to Admission Medications   Prescriptions Last Dose Informant Patient Reported? Taking?   bisacodyl (DULCOLAX) 5 mg EC tablet   No No   Sig: Take 2 tablets (10 mg total) by mouth once for 1 dose Please take with start of miralax prep as per office instructions     Patient not taking: Reported on 2021    clonazePAM (KlonoPIN) 1 mg tablet 2022 at Unknown time Self Yes Yes   Sig: TK 3 TS PO Q NIGHT   ferrous gluconate (FERGON) 324 mg tablet Not Taking at Unknown time  No No   Sig: Take 1 tablet (324 mg total) by mouth daily with breakfast   Patient not taking: Reported on 2022   fluticasone (FLONASE) 50 mcg/act nasal spray Not Taking at Unknown time  No No   Si spray into each nostril daily   Patient not taking: Reported on 2022   levothyroxine 112 mcg tablet 2022 at Unknown time Self Yes Yes   Sig: TK 1 T PO QD   naproxen (EC NAPROSYN) 500 MG EC tablet Not Taking at Unknown time  No No   Sig: Take 1 tablet (500 mg total) by mouth 2 (two) times a day with meals   Patient not taking: Reported on 5/24/2022   ofloxacin (FLOXIN) 0 3 % otic solution Not Taking at Unknown time  Yes No   Sig: INSTILL 5 DROPS IN BOTH EARS TWICE DAILY FOR 3 DAYS   Patient not taking: Reported on 5/24/2022   pantoprazole (PROTONIX) 40 mg tablet Not Taking at Unknown time Self No No   Sig: Take 1 tablet (40 mg total) by mouth daily   Patient not taking: Reported on 5/24/2022   sucralfate (CARAFATE) 1 g/10 mL suspension Not Taking at Unknown time Self No No   Sig: Take 10 mL (1 g total) by mouth 4 (four) times a day   Patient not taking: Reported on 5/24/2022   traZODone (DESYREL) 50 mg tablet 5/23/2022 at Unknown time Self Yes Yes   Sig: Take 150 mg by mouth daily at bedtime      Facility-Administered Medications: None       Past Medical History:   Diagnosis Date    Anemia     Anxiety     Bipolar disorder (HCC)     Colon polyp     Disease of thyroid gland     Essential hypertension     Essential tremor     Fibromyalgia, primary     GERD (gastroesophageal reflux disease)     Inflammatory polyarthropathy (HCC)     Mammogram abnormal        Past Surgical History:   Procedure Laterality Date    BREAST BIOPSY Right 2015    benign    CARPAL TUNNEL RELEASE Bilateral     COLONOSCOPY      EGD AND COLONOSCOPY  01/01/2014    GASTRIC BYPASS  07/01/2012    MAMMO NEEDLE LOCALIZATION RIGHT (ALL INC) Right 2/6/2012    MAMMO NEEDLE LOCALIZATION RIGHT (ALL INC) Right 2/6/2012    ULNAR NERVE TRANSPOSITION Right        Family History   Problem Relation Age of Onset    No Known Problems Mother     No Known Problems Father     No Known Problems Sister     No Known Problems Maternal Grandmother     No Known Problems Maternal Grandfather     No Known Problems Paternal Grandmother     No Known Problems Paternal Grandfather     No Known Problems Sister     No Known Problems Sister     Stomach cancer Maternal Aunt     No Known Problems Maternal Aunt     No Known Problems Maternal Aunt     No Known Problems Maternal Aunt     No Known Problems Paternal Aunt     Leukemia Other      I have reviewed and agree with the history as documented  E-Cigarette/Vaping    E-Cigarette Use Never User      E-Cigarette/Vaping Substances    Nicotine No     THC No     CBD No     Flavoring No      Social History     Tobacco Use    Smoking status: Former Smoker     Quit date:      Years since quittin 4    Smokeless tobacco: Never Used   Vaping Use    Vaping Use: Never used   Substance Use Topics    Alcohol use: Yes     Alcohol/week: 4 0 standard drinks     Types: 4 Glasses of wine per week     Comment: rare    Drug use: Never       Review of Systems   Constitutional: Negative for chills and fever  Respiratory: Positive for shortness of breath  Negative for cough and chest tightness  Cardiovascular: Negative for chest pain, palpitations and leg swelling  Gastrointestinal: Negative for abdominal pain, diarrhea, nausea and vomiting  Genitourinary: Negative for dysuria, frequency, hematuria and urgency  Musculoskeletal: Negative for back pain, neck pain and neck stiffness  All other systems reviewed and are negative  Physical Exam  Physical Exam  Vitals and nursing note reviewed  Constitutional:       General: She is not in acute distress  Appearance: She is well-developed  She is not diaphoretic  HENT:      Head: Normocephalic and atraumatic  Eyes:      Conjunctiva/sclera: Conjunctivae normal       Pupils: Pupils are equal, round, and reactive to light  Cardiovascular:      Rate and Rhythm: Normal rate and regular rhythm  Heart sounds: Normal heart sounds  No murmur heard  Pulmonary:      Effort: Tachypnea present  No accessory muscle usage or respiratory distress  Breath sounds: Normal breath sounds  No decreased breath sounds, wheezing, rhonchi or rales     Abdominal:      General: Bowel sounds are normal  There is no distension  Palpations: Abdomen is soft  Tenderness: There is no abdominal tenderness  Musculoskeletal:         General: No deformity  Normal range of motion  Cervical back: Normal range of motion and neck supple  Skin:     General: Skin is warm and dry  Capillary Refill: Capillary refill takes less than 2 seconds  Coloration: Skin is not pale  Findings: No rash  Neurological:      General: No focal deficit present  Mental Status: She is alert and oriented to person, place, and time  Cranial Nerves: No cranial nerve deficit  Psychiatric:         Mood and Affect: Mood is anxious           Behavior: Behavior normal          Vital Signs  ED Triage Vitals   Temperature Pulse Respirations Blood Pressure SpO2   05/24/22 1436 05/24/22 1436 05/24/22 1436 05/24/22 1436 05/24/22 1436   (!) 97 3 °F (36 3 °C) 96 20 105/57 98 %      Temp Source Heart Rate Source Patient Position - Orthostatic VS BP Location FiO2 (%)   05/24/22 1436 05/24/22 1436 05/24/22 1436 05/24/22 1436 --   Tympanic Monitor Sitting Left arm       Pain Score       05/24/22 2044       No Pain           Vitals:    05/24/22 1840 05/24/22 1915 05/24/22 2054 05/24/22 2054   BP: 147/65 130/64 124/68    Pulse: 84 82 81 79   Patient Position - Orthostatic VS:  Sitting           Visual Acuity      ED Medications  Medications   clonazePAM (KlonoPIN) tablet 3 mg (3 mg Oral Given 5/24/22 2113)   ferrous gluconate (FERGON) tablet 324 mg (has no administration in time range)   levothyroxine tablet 112 mcg (has no administration in time range)   traZODone (DESYREL) tablet 150 mg (150 mg Oral Given 5/24/22 2113)   acetaminophen (TYLENOL) tablet 650 mg (has no administration in time range)   ondansetron (ZOFRAN) injection 4 mg (has no administration in time range)   sodium chloride 0 9 % infusion (50 mL/hr Intravenous New Bag 5/24/22 2112)   iohexol (OMNIPAQUE) 350 MG/ML injection (MULTI-DOSE) 70 mL (70 mL Intravenous Given 5/24/22 1809)       Diagnostic Studies  Results Reviewed     Procedure Component Value Units Date/Time    HS Troponin I 4hr [[id number]]  (Abnormal) Collected: 05/24/22 2106    Lab Status: Final result Specimen: Blood from Arm, Right Updated: 05/24/22 2137     hs TnI 4hr 73 ng/L      Delta 4hr hsTnI 28 ng/L     Osmolality, urine [[id number]] Collected: 05/24/22 1639    Lab Status: In process Specimen: Urine Updated: 05/24/22 2100    Sodium, urine, random [[id number]] Collected: 05/24/22 1639    Lab Status: In process Specimen: Urine, Clean Catch Updated: 05/24/22 2053    Folate [[id number]] Collected: 05/24/22 1639    Lab Status: In process Specimen: Blood from Arm, Left Updated: 05/24/22 2040    Vitamin B12 [[id number]] Collected: 05/24/22 1639    Lab Status: In process Specimen: Blood from Arm, Left Updated: 05/24/22 2040    Iron Saturation % [[id number]] Collected: 05/24/22 1639    Lab Status: In process Specimen: Blood from Arm, Left Updated: 05/24/22 2040    Ferritin [[id number]] Collected: 05/24/22 1639    Lab Status:  In process Specimen: Blood from Arm, Left Updated: 05/24/22 2040    HS Troponin I 2hr [[id number]]  (Abnormal) Collected: 05/24/22 1959    Lab Status: Final result Specimen: Blood from Arm, Left Updated: 05/24/22 2027     hs TnI 2hr 63 ng/L      Delta 2hr hsTnI 18 ng/L     Magnesium [[id number]]  (Normal) Collected: 05/24/22 1639    Lab Status: Final result Specimen: Blood from Arm, Left Updated: 05/24/22 1711     Magnesium 2 2 mg/dL     NT-BNP PRO [[id number]]  (Abnormal) Collected: 05/24/22 1639    Lab Status: Final result Specimen: Blood from Arm, Left Updated: 05/24/22 1711     NT-proBNP 1,527 pg/mL     TSH, 3rd generation with Free T4 reflex [[id number]]  (Normal) Collected: 05/24/22 1639    Lab Status: Final result Specimen: Blood from Arm, Left Updated: 05/24/22 1711     TSH [address] [place] [address] 3 492 uIU/mL     Narrative:      Patients undergoing fluorescein dye angiography may retain small amounts of fluorescein in the body for 48-72 hours post procedure  Samples containing fluorescein can produce falsely depressed TSH values  If the patient had this procedure,a specimen should be resubmitted post fluorescein clearance  HS Troponin 0hr (reflex protocol) [[id number]]  (Normal) Collected: 05/24/22 1639    Lab Status: Final result Specimen: Blood from Arm, Left Updated: 05/24/22 1710     hs TnI 0hr 45 ng/L     D-Dimer [[id number]]  (Abnormal) Collected: 05/24/22 1639    Lab Status: Final result Specimen: Blood from Arm, Left Updated: 05/24/22 1706     D-Dimer, Quant 2 45 ug/ml FEU     Narrative: In the evaluation for possible pulmonary embolism, in the appropriate (Well's Score of 4 or less) patient, the age adjusted d-dimer cutoff for this patient can be calculated as:    Age x 0 01 (in ug/mL) for Age-adjusted D-dimer exclusion threshold for a patient over 50 years      Comprehensive metabolic panel [[id number]]  (Abnormal) Collected: 05/24/22 1639    Lab Status: Final result Specimen: Blood from Arm, Left Updated: 05/24/22 1703     Sodium 131 mmol/L      Potassium 4 5 mmol/L      Chloride 100 mmol/L      CO2 23 mmol/L      ANION GAP 8 mmol/L      BUN 15 mg/dL      Creatinine 1 14 mg/dL      Glucose 95 mg/dL      Calcium 8 9 mg/dL      Corrected Calcium 9 9 mg/dL      AST 32 U/L      ALT 29 U/L      Alkaline Phosphatase 71 U/L      Total Protein 7 2 g/dL      Albumin 2 7 g/dL      Total Bilirubin 0 37 mg/dL      eGFR 46 ml/min/1 73sq m     Narrative:      [facility] guidelines for Chronic Kidney Disease (CKD):     Stage 1 with normal or high GFR (GFR > 90 mL/min/1 73 square meters)    Stage 2 Mild CKD (GFR = 60-89 mL/min/1 73 square meters)    Stage 3A Moderate CKD (GFR = 45-59 mL/min/1 73 square meters)    Stage 3B Moderate CKD (GFR = 30-44 mL/min/1 73 square meters)    Stage 4 Severe CKD (GFR = 15-29 mL/min/1 73 square meters)    Stage 5 End Stage CKD (GFR <15 mL/min/1 73 square meters)  Note: GFR calculation is accurate only with a steady state creatinine    Urine Microscopic [[id number]] Collected: 05/24/22 1639    Lab Status: Final result Specimen: Urine, Clean Catch Updated: 05/24/22 1701     RBC, UA 1-2 /hpf      WBC, UA 0-1 /hpf      Epithelial Cells Occasional /hpf      Bacteria, UA None Seen /hpf      AMORPH URATES Occasional /hpf     UA (URINE) with reflex to Scope [[id number]]  (Abnormal) Collected: 05/24/22 1639    Lab Status: Final result Specimen: Urine, Clean Catch Updated: 05/24/22 1649     Color, UA Light Yellow     Clarity, UA Clear     Specific Gravity, UA <=1 005     pH, UA 6 0     Leukocytes, UA Negative     Nitrite, UA Negative     Protein, UA Negative mg/dl      Glucose, UA Negative mg/dl      Ketones, UA Negative mg/dl      Urobilinogen, UA 0 2 E U /dl      Bilirubin, UA Negative     Blood, UA Moderate    CBC and differential [[id number]]  (Abnormal) Collected: 05/24/22 1639    Lab Status: Final result Specimen: Blood from Arm, Left Updated: 05/24/22 1646     WBC 9 37 Thousand/uL      RBC 2 48 Million/uL      Hemoglobin 7 8 g/dL      Hematocrit 24 5 %      MCV 99 fL      MCH 31 5 pg      MCHC 31 8 g/dL      RDW 14 9 %      MPV 9 5 fL      Platelets 912 Thousands/uL      nRBC 0 /100 WBCs      Neutrophils Relative 59 %      Immat GRANS % 1 %      Lymphocytes Relative 22 %      Monocytes Relative 10 %      Eosinophils Relative 7 %      Basophils Relative 1 %      Neutrophils Absolute 5 60 Thousands/µL      Immature Grans Absolute 0 07 Thousand/uL      Lymphocytes Absolute 2 07 Thousands/µL      Monocytes Absolute 0 94 Thousand/µL      Eosinophils Absolute 0 64 Thousand/µL      Basophils Absolute 0 05 Thousands/µL                  CTA ED chest PE study   Final Result by Zak Michaels MD (05/24 1915)      No acute finding; specifically, no acute pulmonary arterial embolism                    Workstation performed: [id number]         CT chest abdomen pelvis wo contrast   Final Result by Adalberto Estrella DO (05/24 1835)   Limited exam performed without oral or IV contrast       No visceral or osseous injury identified  Pleural-parenchymal scarring and pleural thickening both lung apices, increased from 2019  Consider follow-up chest CT in 6 months to assess for progression  Mild fatty changes in liver  Expected postoperative changes  Cholelithiasis without CT evidence of cholecystitis  Small volume water attenuation ascites  Workstation performed: [id number]         CT cervical spine without contrast   Final Result by Adalberto Estrella DO (05/24 1728)   No cervical spine fracture or traumatic malalignment  Workstation performed: [id number]         CT head without contrast   Final Result by Adalberto Estrella DO (05/24 1739)   No acute intracranial abnormality  Workstation performed: [id number]                    Procedures  ECG 12 Lead Documentation Only    Date/Time: 5/24/2022 5:05 PM  Performed by: Dexter Eckert DO  Authorized by: Dexter Eckert DO     Indications / Diagnosis:  Sob  ECG reviewed by me, the ED Provider: yes    Patient location:  ED  Previous ECG:     Previous ECG:  Unavailable    Comparison to cardiac monitor: Yes    Interpretation:     Interpretation: non-specific    Rate:     ECG rate:  79bpm    ECG rate assessment: normal    Rhythm:     Rhythm: sinus rhythm    Ectopy:     Ectopy: none    QRS:     QRS axis:  Normal  Conduction:     Conduction: normal    ST segments:     ST segments:  Normal  T waves:     T waves: normal               ED Course                               SBIRT 20yo+    Flowsheet Row Most Recent Value   SBIRT (23 yo +)    In order to provide better care to our patients, we are screening all of our patients for alcohol and drug use  Would it be okay to ask you these screening questions?  Yes Filed at: 05/24/2022 1547   Initial Alcohol Screen: US AUDIT-C     1  How often do you have a drink containing alcohol? 0 Filed at: 05/24/2022 1546   2  How many drinks containing alcohol do you have on a typical day you are drinking? 0 Filed at: 05/24/2022 1546   3a  Male UNDER 65: How often do you have five or more drinks on one occasion? 0 Filed at: 05/24/2022 1546   3b  FEMALE Any Age, or MALE 65+: How often do you have 4 or more drinks on one occassion? 0 Filed at: 05/24/2022 1546   Audit-C Score 0 Filed at: 05/24/2022 1546   BRENDA: How many times in the past year have you    Used an illegal drug or used a prescription medication for non-medical reasons?  Never Filed at: 05/24/2022 1546          Lenny' Criteria for PE    Flowsheet Row Most Recent Value   Wells' Criteria for PE    Clinical signs and symptoms of DVT 0 Filed at: 05/24/2022 1721   PE is primary diagnosis or equally likely 3 Filed at: 05/24/2022 1721   HR >100 0 Filed at: 05/24/2022 1721   Immobilization at least 3 days or Surgery in the previous 4 weeks 0 Filed at: 05/24/2022 1721   Previous, objectively diagnosed PE or DVT 0 Filed at: 05/24/2022 1721   Hemoptysis 0 Filed at: 05/24/2022 1721   Malignancy with treatment within 6 months or palliative 0 Filed at: 05/24/2022 1721   Wells' Criteria Total 3 Filed at: 05/24/2022 1721                MDM  Number of Diagnoses or Management Options  Elevated brain natriuretic peptide (BNP) level: new and requires workup  Symptomatic anemia: new and requires workup     Amount and/or Complexity of Data Reviewed  Clinical lab tests: ordered and reviewed  Tests in the radiology section of CPT®: ordered and reviewed    Risk of Complications, Morbidity, and/or Mortality  Presenting problems: high  Diagnostic procedures: high  Management options: high    Patient Progress  Patient progress: stable      Disposition  Final diagnoses:   Symptomatic anemia   Elevated brain natriuretic peptide (BNP) level     Time reflects when diagnosis was documented in both MDM as applicable and the Disposition within this note     Time User Action Codes Description Comment    5/24/2022  7:30 PM Beny Dany Add [D64 9] Symptomatic anemia     5/24/2022  7:31 PM Beny MEZA Add [R79 89] Elevated brain natriuretic peptide (BNP) level       ED Disposition     ED Disposition   Admit    Condition   Stable    Date/Time   Tue May 24, 2022  7:30 PM    Comment   Case was discussed with Guillermo WHITTINGTON and the patient's admission status was agreed to be Admission Status: observation status to the service of Dr Zack Hernandez   Follow-up Information    None         Current Discharge Medication List      CONTINUE these medications which have NOT CHANGED    Details   clonazePAM (KlonoPIN) 1 mg tablet TK 3 TS PO Q NIGHT  Refills: 0      levothyroxine 112 mcg tablet TK 1 T PO QD  Refills: 4      traZODone (DESYREL) 50 mg tablet Take 150 mg by mouth daily at bedtime  Refills: 0      bisacodyl (DULCOLAX) 5 mg EC tablet Take 2 tablets (10 mg total) by mouth once for 1 dose Please take with start of miralax prep as per office instructions    Qty: 2 tablet, Refills: 0    Associated Diagnoses: Encounter for screening colonoscopy      ferrous gluconate (FERGON) 324 mg tablet Take 1 tablet (324 mg total) by mouth daily with breakfast  Qty: 100 tablet, Refills: 3    Associated Diagnoses: Iron deficiency      fluticasone (FLONASE) 50 mcg/act nasal spray 1 spray into each nostril daily  Qty: 16 g, Refills: 2    Associated Diagnoses: Nasal congestion      naproxen (EC NAPROSYN) 500 MG EC tablet Take 1 tablet (500 mg total) by mouth 2 (two) times a day with meals  Qty: 30 tablet, Refills: 0    Associated Diagnoses: Primary osteoarthritis of knees, bilateral      ofloxacin (FLOXIN) 0 3 % otic solution INSTILL 5 DROPS IN BOTH EARS TWICE DAILY FOR 3 DAYS      pantoprazole (PROTONIX) 40 mg tablet Take 1 tablet (40 mg total) by mouth daily  Qty: 90 tablet, Refills: 2    Comments: **Patient requests 90 days supply**  Associated Diagnoses: Epigastric pain; Esophageal dysphagia; Peptic ulcer disease; History of gastric bypass      sucralfate (CARAFATE) 1 g/10 mL suspension Take 10 mL (1 g total) by mouth 4 (four) times a day  Qty: 420 mL, Refills: 3    Associated Diagnoses: Epigastric pain; Esophageal dysphagia; Peptic ulcer disease; History of gastric bypass             No discharge procedures on file      PDMP Review       Value Time User    PDMP Reviewed  Yes 12/14/2021  2:42 PM Madelin Lawson MD          ED Provider  Electronically Signed by           Francisco Mauricio DO  05/24/22 0365

## 2022-05-24 NOTE — ED NOTES
Blood was sent from lab, documented, and hung but blood bag was not properly sealed which causes leakage  Blood transfusion was not yet started  Per lab blood was placed in biohazard bag and returned  Per lab, blood bank was notified of new request for blood and will notify staff when blood arrives  Provider made aware and new orders were placed               Pal Sprague RN  05/24/22 2003

## 2022-05-25 ENCOUNTER — APPOINTMENT (OUTPATIENT)
Dept: NON INVASIVE DIAGNOSTICS | Facility: HOSPITAL | Age: 77
DRG: 381 | End: 2022-05-25
Payer: COMMERCIAL

## 2022-05-25 LAB
ABO GROUP BLD BPU: NORMAL
ANION GAP SERPL CALCULATED.3IONS-SCNC: 9 MMOL/L (ref 4–13)
AORTIC ROOT: 3 CM
BPU ID: NORMAL
BUN SERPL-MCNC: 14 MG/DL (ref 5–25)
CALCIUM SERPL-MCNC: 8.2 MG/DL (ref 8.3–10.1)
CHLORIDE SERPL-SCNC: 106 MMOL/L (ref 100–108)
CO2 SERPL-SCNC: 24 MMOL/L (ref 21–32)
CREAT SERPL-MCNC: 0.98 MG/DL (ref 0.6–1.3)
CROSSMATCH: NORMAL
E WAVE DECELERATION TIME: 225 MS
ERYTHROCYTE [DISTWIDTH] IN BLOOD BY AUTOMATED COUNT: 15.7 % (ref 11.6–15.1)
FERRITIN SERPL-MCNC: 546 NG/ML (ref 8–388)
FOLATE SERPL-MCNC: 17.8 NG/ML (ref 3.1–17.5)
FRACTIONAL SHORTENING: 30 % (ref 28–44)
GFR SERPL CREATININE-BSD FRML MDRD: 55 ML/MIN/1.73SQ M
GLUCOSE P FAST SERPL-MCNC: 93 MG/DL (ref 65–99)
GLUCOSE SERPL-MCNC: 93 MG/DL (ref 65–140)
HCT VFR BLD AUTO: 24.8 % (ref 34.8–46.1)
HGB BLD-MCNC: 8.1 G/DL (ref 11.5–15.4)
INTERVENTRICULAR SEPTUM IN DIASTOLE (PARASTERNAL SHORT AXIS VIEW): 0.9 CM
INTERVENTRICULAR SEPTUM: 0.9 CM (ref 0.6–1.1)
IRON SATN MFR SERPL: 19 % (ref 15–50)
IRON SERPL-MCNC: 34 UG/DL (ref 50–170)
LAAS-AP4: 13.4 CM2
LEFT ATRIUM SIZE: 3.3 CM
LEFT INTERNAL DIMENSION IN SYSTOLE: 2.8 CM (ref 2.1–4)
LEFT VENTRICULAR INTERNAL DIMENSION IN DIASTOLE: 4 CM (ref 3.5–6)
LEFT VENTRICULAR POSTERIOR WALL IN END DIASTOLE: 0.9 CM
LEFT VENTRICULAR STROKE VOLUME: 38 ML
LVSV (TEICH): 38 ML
MAGNESIUM SERPL-MCNC: 2 MG/DL (ref 1.6–2.6)
MCH RBC QN AUTO: 31.4 PG (ref 26.8–34.3)
MCHC RBC AUTO-ENTMCNC: 32.7 G/DL (ref 31.4–37.4)
MCV RBC AUTO: 96 FL (ref 82–98)
MV E'TISSUE VEL-LAT: 9 CM/S
MV E'TISSUE VEL-SEP: 10 CM/S
MV PEAK A VEL: 0.91 M/S
MV PEAK E VEL: 80 CM/S
MV STENOSIS PRESSURE HALF TIME: 65 MS
MV VALVE AREA P 1/2 METHOD: 3.38 CM2
OSMOLALITY UR/SERPL-RTO: 288 MMOL/KG (ref 282–298)
OSMOLALITY UR: 190 MMOL/KG
PLATELET # BLD AUTO: 519 THOUSANDS/UL (ref 149–390)
PMV BLD AUTO: 9 FL (ref 8.9–12.7)
POTASSIUM SERPL-SCNC: 3.6 MMOL/L (ref 3.5–5.3)
RBC # BLD AUTO: 2.58 MILLION/UL (ref 3.81–5.12)
RIGHT VENTRICLE ID DIMENSION: 3.1 CM
SL CV LV EF: 55
SL CV PED ECHO LEFT VENTRICLE DIASTOLIC VOLUME (MOD BIPLANE) 2D: 69 ML
SL CV PED ECHO LEFT VENTRICLE SYSTOLIC VOLUME (MOD BIPLANE) 2D: 31 ML
SODIUM 24H UR-SCNC: 25 MOL/L
SODIUM SERPL-SCNC: 139 MMOL/L (ref 136–145)
T4 FREE SERPL-MCNC: 1.31 NG/DL (ref 0.76–1.46)
TIBC SERPL-MCNC: 183 UG/DL (ref 250–450)
TR MAX PG: 25 MMHG
TR PEAK VELOCITY: 2.5 M/S
TRICUSPID VALVE PEAK REGURGITATION VELOCITY: 2.52 M/S
UNIT DISPENSE STATUS: NORMAL
UNIT PRODUCT CODE: NORMAL
UNIT PRODUCT VOLUME: 350 ML
UNIT RH: NORMAL
VIT B12 SERPL-MCNC: 1300 PG/ML (ref 100–900)
WBC # BLD AUTO: 7.38 THOUSAND/UL (ref 4.31–10.16)

## 2022-05-25 PROCEDURE — 84439 ASSAY OF FREE THYROXINE: CPT | Performed by: NURSE PRACTITIONER

## 2022-05-25 PROCEDURE — 93306 TTE W/DOPPLER COMPLETE: CPT | Performed by: INTERNAL MEDICINE

## 2022-05-25 PROCEDURE — 97535 SELF CARE MNGMENT TRAINING: CPT

## 2022-05-25 PROCEDURE — 97530 THERAPEUTIC ACTIVITIES: CPT

## 2022-05-25 PROCEDURE — 97163 PT EVAL HIGH COMPLEX 45 MIN: CPT

## 2022-05-25 PROCEDURE — 93306 TTE W/DOPPLER COMPLETE: CPT

## 2022-05-25 PROCEDURE — 80048 BASIC METABOLIC PNL TOTAL CA: CPT | Performed by: NURSE PRACTITIONER

## 2022-05-25 PROCEDURE — 99223 1ST HOSP IP/OBS HIGH 75: CPT | Performed by: INTERNAL MEDICINE

## 2022-05-25 PROCEDURE — 85027 COMPLETE CBC AUTOMATED: CPT | Performed by: NURSE PRACTITIONER

## 2022-05-25 PROCEDURE — 97167 OT EVAL HIGH COMPLEX 60 MIN: CPT

## 2022-05-25 PROCEDURE — 83735 ASSAY OF MAGNESIUM: CPT | Performed by: NURSE PRACTITIONER

## 2022-05-25 PROCEDURE — 99232 SBSQ HOSP IP/OBS MODERATE 35: CPT | Performed by: FAMILY MEDICINE

## 2022-05-25 RX ORDER — POLYETHYLENE GLYCOL 3350 17 G/17G
238 POWDER, FOR SOLUTION ORAL ONCE
Status: COMPLETED | OUTPATIENT
Start: 2022-05-25 | End: 2022-05-25

## 2022-05-25 RX ORDER — MAGNESIUM CARB/ALUMINUM HYDROX 105-160MG
296 TABLET,CHEWABLE ORAL ONCE
Status: COMPLETED | OUTPATIENT
Start: 2022-05-25 | End: 2022-05-25

## 2022-05-25 RX ADMIN — POLYETHYLENE GLYCOL 3350 238 G: 17 POWDER, FOR SOLUTION ORAL at 16:20

## 2022-05-25 RX ADMIN — IRON SUCROSE 300 MG: 20 INJECTION, SOLUTION INTRAVENOUS at 22:43

## 2022-05-25 RX ADMIN — FERROUS GLUCONATE 324 MG: 324 TABLET ORAL at 12:30

## 2022-05-25 RX ADMIN — BISACODYL 10 MG: 5 TABLET, COATED ORAL at 22:42

## 2022-05-25 RX ADMIN — TRAZODONE HYDROCHLORIDE 150 MG: 50 TABLET ORAL at 23:11

## 2022-05-25 RX ADMIN — LEVOTHYROXINE SODIUM 112 MCG: 112 TABLET ORAL at 05:35

## 2022-05-25 RX ADMIN — CLONAZEPAM 3 MG: 1 TABLET ORAL at 23:11

## 2022-05-25 RX ADMIN — MAGNESIUM CITRATE 296 ML: 1.75 LIQUID ORAL at 14:07

## 2022-05-25 RX ADMIN — SODIUM CHLORIDE 50 ML/HR: 0.9 INJECTION, SOLUTION INTRAVENOUS at 23:16

## 2022-05-25 NOTE — ASSESSMENT & PLAN NOTE
Sodium 131   Sodium level normal over the past 2 days  Likely due to poor intake  Patient initially received IV hydration  uric acid WNL    TSH normal

## 2022-05-25 NOTE — PLAN OF CARE
Problem: Potential for Falls  Goal: Patient will remain free of falls  Description: INTERVENTIONS:  - Educate patient/family on patient safety including physical limitations  - Instruct patient to call for assistance with activity   - Consult OT/PT to assist with strengthening/mobility   - Keep Call bell within reach  - Keep bed low and locked with side rails adjusted as appropriate  - Keep care items and personal belongings within reach  - Initiate and maintain comfort rounds  - Make Fall Risk Sign visible to staff  - Offer Toileting every 2 Hours, in advance of need  - Initiate/Maintain bed alarm  - Obtain necessary fall risk management equipment: yellow socks  - Apply yellow socks and bracelet for high fall risk patients  - Consider moving patient to room near nurses station  Outcome: Progressing     Problem: Nutrition/Hydration-ADULT  Goal: Nutrient/Hydration intake appropriate for improving, restoring or maintaining nutritional needs  Description: Monitor and assess patient's nutrition/hydration status for malnutrition  Collaborate with interdisciplinary team and initiate plan and interventions as ordered  Monitor patient's weight and dietary intake as ordered or per policy  Utilize nutrition screening tool and intervene as necessary  Determine patient's food preferences and provide high-protein, high-caloric foods as appropriate       INTERVENTIONS:  - Monitor oral intake, urinary output, labs, and treatment plans  - Assess nutrition and hydration status and recommend course of action  - Evaluate amount of meals eaten  - Assist patient with eating if necessary   - Allow adequate time for meals  - Recommend/ encourage appropriate diets, oral nutritional supplements, and vitamin/mineral supplements  - Order, calculate, and assess calorie counts as needed  - Recommend, monitor, and adjust tube feedings and TPN/PPN based on assessed needs  - Assess need for intravenous fluids  - Provide specific nutrition/hydration education as appropriate  - Include patient/family/caregiver in decisions related to nutrition  Outcome: Progressing     Problem: MOBILITY - ADULT  Goal: Maintain or return to baseline ADL function  Description: INTERVENTIONS:  -  Assess patient's ability to carry out ADLs; assess patient's baseline for ADL function and identify physical deficits which impact ability to perform ADLs (bathing, care of mouth/teeth, toileting, grooming, dressing, etc )  - Assess/evaluate cause of self-care deficits   - Assess range of motion  - Assess patient's mobility; develop plan if impaired  - Assess patient's need for assistive devices and provide as appropriate  - Encourage maximum independence but intervene and supervise when necessary  - Involve family in performance of ADLs  - Assess for home care needs following discharge   - Consider OT consult to assist with ADL evaluation and planning for discharge  - Provide patient education as appropriate  Outcome: Progressing  Goal: Maintains/Returns to pre admission functional level  Description: INTERVENTIONS:  - Perform BMAT or MOVE assessment daily    - Set and communicate daily mobility goal to care team and patient/family/caregiver  - Collaborate with rehabilitation services on mobility goals if consulted  - Perform Range of Motion 4 times a day  - Reposition patient every 2 hours    - Dangle patient 3 times a day  - Stand patient 3 times a day  - Ambulate patient 3 times a day  - Out of bed to chair 3 times a day   - Out of bed for meals 3 times a day  - Out of bed for toileting  - Record patient progress and toleration of activity level   Outcome: Progressing     Problem: PAIN - ADULT  Goal: Verbalizes/displays adequate comfort level or baseline comfort level  Description: Interventions:  - Encourage patient to monitor pain and request assistance  - Assess pain using appropriate pain scale  - Administer analgesics based on type and severity of pain and evaluate response  - Implement non-pharmacological measures as appropriate and evaluate response  - Consider cultural and social influences on pain and pain management  - Notify physician/advanced practitioner if interventions unsuccessful or patient reports new pain  Outcome: Progressing     Problem: INFECTION - ADULT  Goal: Absence or prevention of progression during hospitalization  Description: INTERVENTIONS:  - Assess and monitor for signs and symptoms of infection  - Monitor lab/diagnostic results  - Monitor all insertion sites, i e  indwelling lines, tubes, and drains  - Monitor endotracheal if appropriate and nasal secretions for changes in amount and color  - [place] appropriate cooling/warming therapies per order  - Administer medications as ordered  - Instruct and encourage patient and family to use good hand hygiene technique  - Identify and instruct in appropriate isolation precautions for identified infection/condition  Outcome: Progressing     Problem: SAFETY ADULT  Goal: Patient will remain free of falls  Description: INTERVENTIONS:  - Educate patient/family on patient safety including physical limitations  - Instruct patient to call for assistance with activity   - Consult OT/PT to assist with strengthening/mobility   - Keep Call bell within reach  - Keep bed low and locked with side rails adjusted as appropriate  - Keep care items and personal belongings within reach  - Initiate and maintain comfort rounds  - Make Fall Risk Sign visible to staff  - Offer Toileting every 2 Hours, in advance of need  - Initiate/Maintain bed alarm  - Obtain necessary fall risk management equipment: yellow socks  - Apply yellow socks and bracelet for high fall risk patients  - Consider moving patient to room near nurses station  Outcome: Progressing  Goal: Maintain or return to baseline ADL function  Description: INTERVENTIONS:  -  Assess patient's ability to carry out ADLs; assess patient's baseline for ADL function and identify physical deficits which impact ability to perform ADLs (bathing, care of mouth/teeth, toileting, grooming, dressing, etc )  - Assess/evaluate cause of self-care deficits   - Assess range of motion  - Assess patient's mobility; develop plan if impaired  - Assess patient's need for assistive devices and provide as appropriate  - Encourage maximum independence but intervene and supervise when necessary  - Involve family in performance of ADLs  - Assess for home care needs following discharge   - Consider OT consult to assist with ADL evaluation and planning for discharge  - Provide patient education as appropriate  Outcome: Progressing  Goal: Maintains/Returns to pre admission functional level  Description: INTERVENTIONS:  - Perform BMAT or MOVE assessment daily    - Set and communicate daily mobility goal to care team and patient/family/caregiver  - Collaborate with rehabilitation services on mobility goals if consulted  - Perform Range of Motion 4 times a day  - Reposition patient every 2 hours    - Dangle patient 3 times a day  - Stand patient 3 times a day  - Ambulate patient 3 times a day  - Out of bed to chair 3 times a day   - Out of bed for meals 3 times a day  - Out of bed for toileting  - Record patient progress and toleration of activity level   Outcome: Progressing     Problem: DISCHARGE PLANNING  Goal: Discharge to home or other facility with appropriate resources  Description: INTERVENTIONS:  - Identify barriers to discharge w/patient and caregiver  - Arrange for needed discharge resources and transportation as appropriate  - Identify discharge learning needs (meds, wound care, etc )  - Arrange for interpretive services to assist at discharge as needed  - Refer to Case Management Department for coordinating discharge planning if the patient needs post-hospital services based on physician/advanced practitioner order or complex needs related to functional status, cognitive ability, or social support system  Outcome: Progressing     Problem: Knowledge Deficit  Goal: Patient/family/caregiver demonstrates understanding of disease process, treatment plan, medications, and discharge instructions  Description: Complete learning assessment and assess knowledge base    Interventions:  - Provide teaching at level of understanding  - Provide teaching via preferred learning methods  Outcome: Progressing

## 2022-05-25 NOTE — PHYSICAL THERAPY NOTE
PHYSICAL THERAPY EVALUATION/TREATMENT     05/25/22 1035   Note Type   Note type Evaluation   Pain Assessment   Pain Assessment Tool Womack-Baker FACES   Womack-Baker FACES Pain Rating 4   Pain Location/Orientation   (knees)   Restrictions/Precautions   Other Precautions Pain; Fall Risk   Home Living   Type of [facility] Two level;1/2 bath on main level;Bed/bath upstairs  Pt reports she could stay on the first floor if needed   Home Equipment   (none)   Prior Function   Level of [place] Independent with ADLs and functional mobility  (recent progressive weakness, pt reports she has been crawling up her stairs)   Lives With Alone   ADL Assistance Independent   General   Additional Pertinent History Pt admitted wtih anemia  Pt reports she has been getting progressively weaker recently  Pt also c/o B knee pain  Cognition   Arousal/Participation Cooperative   Following Commands Follows one step commands without difficulty   Subjective   Subjective "I don't know why I don't feel good"   RLE Assessment   RLE Assessment   (ROM WFL, MMT 4/5)   LLE Assessment   LLE Assessment   (ROM WFL, MMT 4/5)   Bed Mobility   Supine to Sit 7  Independent   Sit to Supine 7  Independent   Transfers   Sit to Stand 5  Supervision   Stand to Sit 5  Supervision   Stand pivot 5  Supervision   Ambulation/Elevation   Gait Assistance 5  Supervision   Assistive Device   (none)   Distance 20 feet;  pt leans forward  onto furniture, sits impulsively   Balance   Static Sitting Good   Dynamic Sitting Fair +   Static Standing Fair +   Dynamic Standing Fair   Ambulatory Fair   Activity Tolerance   Activity Tolerance Patient limited by fatigue;Patient limited by pain   Assessment   Prognosis Good   Problem List Decreased strength;Decreased mobility;Pain   Assessment Patient seen for Physical Therapy evaluation  Patient admitted with Anemia    Comorbidities affecting patient's physical performance include: COVID-19, fibromylagia, anxiety/depression  Personal factors affecting patient at time of initial evaluation include: lives in 2 story house, inability to perform dynamic tasks in community and limited home support  Prior to admission, patient was independent with functional mobility without assistive device and independent with ADLS  Please find objective findings from Physical Therapy assessment regarding body systems outlined above with impairments and limitations including weakness, decreased endurance, pain, decreased activity tolerance and decreased functional mobility tolerance  The Barthel Index was used as a functional outcome tool presenting with a score of Barthel Index Score: 70 today indicating moderate limitations of functional mobility and ADLS  Patient's clinical presentation is currently unstable/unpredictable as seen in patient's presentation of changing level of pain, new onset of impairment of functional mobility and new onset of weakness  Pt would benefit from continued Physical Therapy treatment to address deficits as defined above and maximize level of functional mobility  As demonstrated by objective findings, the assigned level of complexity for this evaluation is high  The patient's AM-[facility] Basic Mobility Inpatient Short Form Raw Score is 22  A Raw score of greater than 16 suggests the patient may benefit from discharge to home  Goals   Patient Goals "no pain, more energy"   STG Expiration Date 06/07/22   Short Term Goal #1 independent ambulation indoor level surfaces with a steady gait wtih less than 5/10 pain, independent up and down 10 steps with mild fatigue   LTG Expiration Date 06/07/22   Long Term Goal #1 pt will ambulate x 300 feet outdoor surfaces without fatigue, pt will tolerated at least 15 minutes of standing activity so pt can groom at the sink  Plan   Treatment/Interventions ADL retraining;Functional transfer training;LE strengthening/ROM; Elevations; Therapeutic exercise; Endurance training;Gait training;Bed mobility; Equipment eval/education;Patient/family training   PT Frequency Other (Comment)  (5w)   Recommendation   PT Discharge Recommendation Home with outpatient rehabilitation   AM-PAC Basic Mobility Inpatient   Turning in Bed Without Bedrails 4   Lying on Back to Sitting on Edge of Flat Bed 4   Moving Bed to Chair 4   Standing Up From Chair 4   Walk in Room 3   Climb 3-5 Stairs 3   Basic Mobility Inpatient Raw Score 22   Basic Mobility Standardized Score 47 4   Highest Level Of Mobility   JH-HLM Goal 7: Walk 25 feet or more   JH-HLM Achieved 7: Walk 25 feet or more   Barthel Index   Feeding 10   Bathing 0   Grooming Score 5   Dressing Score 10   Bladder Score 10   Bowels Score 10   Toilet Use Score 5   Transfers (Bed/Chair) Score 15   Mobility (Level Surface) Score 0   Stairs Score 5   Barthel Index Score 70   Additional Treatment Session   Start Time 1025   End Time 1035   Treatment Assessment S:  I want to know why I don't feel well  O:  Pt educated on the benefits of PT, of using a cane or walker  Pt ambulated with a rolling walker x 30 feet with supervision and cues for safety  A:  Charyl Maico does not seem to improve pt's activity tolerance  P: continue PT to improve strength, endurance and tolerance to functional mobility  End of Consult   Patient Position at End of Consult Supine; All needs within reach;Bed/Chair alarm activated   Licensure   [place] License Number  Bandar Martinez PT [id number]

## 2022-05-25 NOTE — ASSESSMENT & PLAN NOTE
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  There were multiple cratered, linear ulcers in the body and at the anastomosis  Had normal colonoscopy at the same time  Repeat EGD in 5/2021 showed healed ulcers  Patient denies evidence of bleeding at home  Taking iron supplement whenever she remembers it  No longer taking Protonix  Suspect presenting symptoms due to anemia  Status post 1 unit RBC transfusion  Status post colonoscopy with no lesion or bleeding  Also status post EGD yesterday which showed large ulcer near anastomotic site and distal gastric remnant  No active bleeding was noted  - will need repeat EGD with GI after discharge  Continue PPI b i d  And Carafate 4 times a day  iron profile reviewed  Patient with low iron, TIBC, high ferritin, iron sat of 19  B12 1300, folate 17  Patient received 3 dose of IV Venofer during hospitalization    Hemoglobin has been stable

## 2022-05-25 NOTE — ASSESSMENT & PLAN NOTE
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

## 2022-05-25 NOTE — ASSESSMENT & PLAN NOTE
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

## 2022-05-25 NOTE — ASSESSMENT & PLAN NOTE
Sodium 131  No prior history in epic  Likely due to poor intake  Try gentle IV hydration NS 50 cc/hour  Check urine sodium, urine Osmo, serum osmol, uric acid    TSH normal  Repeat BMP in the morning

## 2022-05-25 NOTE — UTILIZATION REVIEW
Initial Clinical Review    Observation 5/24/22 @ 1932, converted to inpatient admission 5/25/22 @ [id number] for continued care & tx for anemia  Admission: Date/Time/Statement:   Admission Orders (From admission, onward)     Ordered        05/25/22 1836  Inpatient Admission  Once            05/24/22 1932  Place in Observation  Once                      Orders Placed This Encounter   Procedures    Inpatient Admission     Standing Status:   Standing     Number of Occurrences:   1     Order Specific Question:   Level of Care     Answer:   Med Surg [16]     Order Specific Question:   Estimated length of stay     Answer:   More than 2 Midnights     Order Specific Question:   Certification     Answer:   I certify that inpatient services are medically necessary for this patient for a duration of greater than two midnights  See H&P and MD Progress Notes for additional information about the patient's course of treatment  ED Arrival Information     Expected   -    Arrival   5/24/2022 14:26    Acuity   Urgent            Means of arrival   Wheelchair    Escorted by   Family Member    Service   Hospitalist    Admission type   Urgent            Arrival complaint   SOB/dificulty walking  Fell back yesterday-hit back of head / no HX of blood thinners           Chief Complaint   Patient presents with    Shortness of Breath     Pt states SOB for months and has been losing wt  Initial Presentation:  64 yof to ER from home c/o whole body pain, generalized weakness, increasing SOB, dizziness x 3 months  Hx anxiety, gastric bypass, hypothyroid, osteoarthritis, fibromyalgia, bipolar, HTN  Presents anxious, SOB  Admission work-up showing Hgb 7 8, hyponatremia, elevated d-dimer, BNP, ferritin, +troponin  Placed in observation status for anemia  Observation to IP admission 5/25/22:  Hgb 7 7, transfusion 1uprbc's ordered  GI consulted  Per GI: symptomatic anemia  Monitor for bleeding   Transfuse PRN, EGD & Colonoscopy, Iron supplements per attending  Day 2- 5/26/22:  Persistent epigastric pain  Scheduled for EGD & colonoscopy today  Hgb 8 1 s/p transfusion with drop to 7 7, monitor  EGD=  FINDINGS:  · Single large, deep, irregular, benign-appearing ulcer in the gastric pouch and gastrojejunal anastomosis; performed cold forceps biopsy  Large ulcer noted at the anastomosis/distal gastric pouch  Staples noted in this area  No active bleeding from the ulceration  The ulceration is large measuring at least 2 5 x 3 cm  · Regular Z-line 40 cm from the incisors   IMPRESSION:  1  Large ulcer noted near the anastomotic site and distal gastric remnant  Staples noted in this area  No active bleeding or stigmata of bleeding noted from the ulceration  Biopsies taken  2  Status post Savage-en-Y gastric bypass surgery  Colonoscopy=  FINDINGS:  · Few small mild diverticula in the sigmoid colon   IMPRESSION:  1  There is no lesion in the colon to explain bleeding  Bleeding high gastric anastomotic ulcer has been identified      ED Triage Vitals   Temperature Pulse Respirations Blood Pressure SpO2   05/24/22 1436 05/24/22 1436 05/24/22 1436 05/24/22 1436 05/24/22 1436   (!) 97 3 °F (36 3 °C) 96 20 105/57 98 %      Temp Source Heart Rate Source Patient Position - Orthostatic VS BP Location FiO2 (%)   05/24/22 1436 05/24/22 1436 05/24/22 1436 05/24/22 1436 --   Tympanic Monitor Sitting Left arm       Pain Score       05/24/22 2044       No Pain          Wt Readings from Last 1 Encounters:   05/26/22 53 5 kg (117 lb 15 1 oz)     Additional Vital Signs:   05/25/22 04:11:08 -- 72 18 115/53 74 95 % -- --   05/25/22 03:57:55 -- 65 -- 100/49 Abnormal  66 96 % -- --   05/25/22 02:21:30 98 2 °F (36 8 °C) 71 18 98/47 Abnormal  64 96 % -- --   05/25/22 00:45:30 -- 72 -- 100/47 Abnormal  65 93 % -- --   05/25/22 00:20:02 98 4 °F (36 9 °C) 74 18 97/46 Abnormal  63 94 % -- --   05/24/22 23:51:45 98 2 °F (36 8 °C) 73 18 102/41 Abnormal  61 96 % -- -- 05/24/22 23:39:35 97 5 °F (36 4 °C) 74 18 116/59 78 99 % -- --   05/24/22 2339 97 5 °F (36 4 °C) 75 18 116/59 -- 98 % -- --     Pertinent Labs/Diagnostic Test Results:   CTA ED chest PE study   Final Result (05/24 1915)      No acute finding; specifically, no acute pulmonary arterial embolism  CT chest abdomen pelvis wo contrast   Final Result  (05/24 1835)   Limited exam performed without oral or IV contrast       No visceral or osseous injury identified  Pleural-parenchymal scarring and pleural thickening both lung apices, increased from 2019  Consider follow-up chest CT in 6 months to assess for progression  Mild fatty changes in liver  Expected postoperative changes  Cholelithiasis without CT evidence of cholecystitis  Small volume water attenuation ascites  CT cervical spine without contrast   Final Result (05/24 1728)   No cervical spine fracture or traumatic malalignment  CT head without contrast   Final Result  (05/24 1739)   No acute intracranial abnormality       5/24  Ekg=  Rhythm: sinus rhythm     Ectopy:     Ectopy: none     QRS:     QRS axis:  Normal   Conduction:     Conduction: normal     ST segments:     ST segments:  Normal   T waves:     T waves: normal      Results from last 7 days   Lab Units 05/26/22  0558 05/25/22  0544 05/24/22  1639   WBC Thousand/uL 7 94 7 38 9 37   HEMOGLOBIN g/dL 7 7* 8 1* 7 8*   HEMATOCRIT % 24 0* 24 8* 24 5*   PLATELETS Thousands/uL 504* 519* 676*   NEUTROS ABS Thousands/µL  --   --  5 60         Results from last 7 days   Lab Units 05/26/22  0558 05/25/22  0544 05/24/22  1639   SODIUM mmol/L 136 139 131*   POTASSIUM mmol/L 3 4* 3 6 4 5   CHLORIDE mmol/L 107 106 100   CO2 mmol/L 23 24 23   ANION GAP mmol/L 6 9 8   BUN mg/dL 8 14 15   CREATININE mg/dL 0 91 0 98 1 14   EGFR ml/min/1 73sq m 60 55 46   CALCIUM mg/dL 8 2* 8 2* 8 9   MAGNESIUM mg/dL  --  2 0 2 2     Results from last 7 days   Lab Units 05/24/22  1639   AST U/L 32   ALT U/L 29 ALK PHOS U/L 71   TOTAL PROTEIN g/dL 7 2   ALBUMIN g/dL 2 7*   TOTAL BILIRUBIN mg/dL 0 37     Results from last 7 days   Lab Units 05/26/22  0558 05/25/22  0544 05/24/22  1639   GLUCOSE RANDOM mg/dL 93 93 95     Results from last 7 days   Lab Units 05/24/22  2106   OSMOLALITY, SERUM mmol/     Results from last 7 days   Lab Units 05/24/22 2106 05/24/22 1959 05/24/22  1639   HS TNI 0HR ng/L  --   --  45   HS TNI 2HR ng/L  --  63*  --    HSTNI D2 ng/L  --  18  --    HS TNI 4HR ng/L 73*  --   --    HSTNI D4 ng/L 28*  --   --      Results from last 7 days   Lab Units 05/24/22  1639   D-DIMER QUANTITATIVE ug/ml FEU 2 45*     Results from last 7 days   Lab Units 05/24/22  1639   TSH 3RD GENERATON uIU/mL 3 492     Results from last 7 days   Lab Units 05/24/22  1639   NT-PRO BNP pg/mL 1,527*     Results from last 7 days   Lab Units 05/24/22  1639   FERRITIN ng/mL 546*     Results from last 7 days   Lab Units 05/24/22 2106 05/24/22  1639   OSMOLALITY, SERUM mmol/  --    OSMO UR mmol/KG  --  190*     Results from last 7 days   Lab Units 05/24/22  1639   CLARITY UA  Clear   COLOR UA  Light Yellow   SPEC GRAV UA  <=1 005   PH UA  6 0   GLUCOSE UA mg/dl Negative   KETONES UA mg/dl Negative   BLOOD UA  Moderate*   PROTEIN UA mg/dl Negative   NITRITE UA  Negative   BILIRUBIN UA  Negative   UROBILINOGEN UA E U /dl 0 2   LEUKOCYTES UA  Negative   WBC UA /hpf 0-1   RBC UA /hpf 1-2   BACTERIA UA /hpf None Seen   EPITHELIAL CELLS WET PREP /hpf Occasional   SODIUM UR  25       ED Treatment:   Medication Administration from 05/24/2022 1425 to 05/24/2022 2036       Date/Time Order Dose Route Action     05/24/2022 1809 iohexol (OMNIPAQUE) 350 MG/ML injection (MULTI-DOSE) 70 mL 70 mL Intravenous Given        Past Medical History:   Diagnosis Date    Anemia     Anxiety     Bipolar disorder (HCC)     Colon polyp     Disease of thyroid gland     Essential hypertension     Essential tremor     Fibromyalgia, primary     GERD (gastroesophageal reflux disease)     Inflammatory polyarthropathy (HCC)     Mammogram abnormal      Present on Admission:   Fibromyalgia syndrome   Mixed anxiety depressive disorder   Other specified hypothyroidism    Admitting Diagnosis: Shortness of breath [R06 02]  Elevated brain natriuretic peptide (BNP) level [R79 89]  Symptomatic anemia [D64 9]  Age/Sex: 64 y o  female  Admission Orders:  Cont pulse ox  Telemetry  Orthostatic BP  Pt/ot eval & tx  OT cognitive eval  scd  Consult GI  Transfuse 1uprbc's    Scheduled Medications:  clonazePAM, 3 mg, Oral, HS  ferrous gluconate, 324 mg, Oral, Daily With Breakfast  iron sucrose (VENOFER) 300 mg in sodium chloride 0 9 % 250 mL IVPB, Once, 5/25  levothyroxine, 112 mcg, Oral, Early Morning  traZODone, 150 mg, Oral, HS    Continuous IV Infusions:  sodium chloride, 50 mL/hr, Intravenous, Continuous    PRN Meds:  acetaminophen, 650 mg, Oral, Q6H PRN  ondansetron, 4 mg, Intravenous, Q6H PRN    Network Utilization Review Department  ATTENTION: Please call with any questions or concerns to [phone] and carefully listen to the prompts so that you are directed to the right person  All voicemails are confidential   [facility] [place] all requests for admission clinical reviews, approved or denied determinations and any other requests to dedicated fax number below belonging to the campus where the patient is receiving treatment   List of dedicated fax numbers for the Facilities:  [address] DENIALS (Administrative/Medical Necessity) [phone]   [address] [place] [address] (Maternity/NICU/Pediatrics) [address] [place] [address],[address] [facility] [address] [place] [address] [place] [address] Leona Bolaños 14[address] [phone]

## 2022-05-25 NOTE — CONSULTS
Physician Requesting Consult: Soila So DO    Reason for Consult / Principal Problem:  Symptomatic anemia    Assessment/Plan:  20-year-old female with past medical history of anxiety, gastric bypass, bleeding gastric ulcers, hypothyroidism, and osteoarthritis who presents to [facility] on 05/24 with report of generalized weakness, lightheadedness, dizziness and shortness of breath with exertion  1  Symptomatic anemia  Patient presented with symptomatic anemia  Hemoglobin and emergent room 7 8 hemoglobin this a m  8 1  Patient's baseline hemoglobin 12 7  Patient does report a history of iron deficient anemia but has not taking her iron for some time  Anemia most likely multifactorial but need to rule out GI blood losses contributing factor  Patient denies any signs of GI bleed  Patient denies bright red blood in stool, bright red blood from rectal area, or black tarry stool  No nausea or vomiting  Mild tenderness to palpation in mid epigastric  -Monitor hemoglobin  -Transfuse blood products as needed to keep hemoglobin greater than 7  -Monitor signs of GI bleed  -Schedule for EGD and colonoscopy tomorrow prep and procedure explained to patient in detail  Further recommendations pending results of EGD and colonoscopy  -Continue supportive care  -Iron supplements per attending    Will follow  Thank you for the consult  Case discussed with Dr Ana Rosa Daniels      HPI: Philip Anders is a 64 y o  female  Anemia  This is a 20-year-old female with past medical history of anxiety, gastric bypass, bleeding gastric ulcers, hypothyroidism, and osteoarthritis who presents to [facility] on 05/24 with report of generalized weakness, lightheadedness, dizziness and shortness of breath with exertion  Lab work in emergency room showed hemoglobin 7 8  Hemoglobin 8 1 this a m  [facility] Baseline hemoglobin 12 7    Patient does have a history of iron deficient anemia but reports she has not been taking her iron supplement slightly  Patient denies any signs of active GI bleed  Patient denies nausea, vomiting, acid reflux, heartburn, dysphagia, epigastric or abdominal pain  Patient denies blood in stool, blood from rectal area, or black tarry stool  Patient reported last week she had a a few days of diarrhea  Patient stated she was her last bowel movement was on Sunday  Patient is a former smoker she quit in [facility]  Patient reports she drinks wine on rare occasions only a few times a year  Patient stated last time she had any alcohol to drink was at [place]  No illicit drug use  Denies any family history of stomach or colon cancer  Last colonoscopy 09/30/2019 which showed normal colonoscopy  Biopsies obtained to rule out collagenous/microscopic colitis  Biopsy showed colonic mucosa with melanosis coli  No evidence of microscopic colitis or acute inflammation  Negative for dysplasia and malignancy  Last EGD 07/07/2021 which showed normal esophagus  History of gastric bypass surgery  Mild gastritis status post biopsies  Ulcer seen on previous endoscopy were healed  Normal jejunum      Allergies: No Known Allergies    Medications:  Current Facility-Administered Medications:     acetaminophen (TYLENOL) tablet 650 mg, 650 mg, Oral, Q6H PRN, Otis Blancarik, CRNP    clonazePAM (KlonoPIN) tablet 3 mg, 3 mg, Oral, HS, Otis Blancarik, CRNP, 3 mg at 05/24/22 2113    ferrous gluconate (FERGON) tablet 324 mg, 324 mg, Oral, Daily With Breakfast, Otis Blancarik, CRNP, 324 mg at 05/25/22 1230    levothyroxine tablet 112 mcg, 112 mcg, Oral, Early Morning, Otis Blancarik, CRNP, 112 mcg at 05/25/22 0535    ondansetron (ZOFRAN) injection 4 mg, 4 mg, Intravenous, Q6H PRN, Otis Blancarik, CRNP    sodium chloride 0 9 % infusion, 50 mL/hr, Intravenous, Continuous, TOLU Moore, Last Rate: 50 mL/hr at 05/25/22 0226, 50 mL/hr at 05/25/22 0226    traZODone (DESYREL) tablet 150 mg, 150 mg, Oral, HS, PING MooreNP, 150 mg at 22    Past Medical history:  Past Medical History:   Diagnosis Date    Anemia     Anxiety     Bipolar disorder ([address] )     Colon polyp     Disease of thyroid gland     Essential hypertension     Essential tremor     Fibromyalgia, primary     GERD (gastroesophageal reflux disease)     Inflammatory polyarthropathy (HCC)     Mammogram abnormal        Past Surgical History:   Past Surgical History:   Procedure Laterality Date    BREAST BIOPSY Right 2015    benign    CARPAL TUNNEL RELEASE Bilateral     COLONOSCOPY      EGD AND COLONOSCOPY  2014    GASTRIC BYPASS  2012    MAMMO NEEDLE LOCALIZATION RIGHT (ALL INC) Right 2012    MAMMO NEEDLE LOCALIZATION RIGHT (ALL INC) Right 2012    ULNAR NERVE TRANSPOSITION Right        Social history:   Social History     Tobacco Use    Smoking status: Former Smoker     Quit date:      Years since quittin 4    Smokeless tobacco: Never Used   Vaping Use    Vaping Use: Never used   Substance Use Topics    Alcohol use: Yes     Alcohol/week: 4 0 standard drinks     Types: 4 Glasses of wine per week     Comment: rare    Drug use: Never       Family history:   Family History   Problem Relation Age of Onset    No Known Problems Mother     No Known Problems Father     No Known Problems Sister     No Known Problems Maternal Grandmother     No Known Problems Maternal Grandfather     No Known Problems Paternal Grandmother     No Known Problems Paternal Grandfather     No Known Problems Sister     No Known Problems Sister     Stomach cancer Maternal Aunt     No Known Problems Maternal Aunt     No Known Problems Maternal Aunt     No Known Problems Maternal Aunt     No Known Problems Paternal Aunt     Leukemia Other         Review of Systems: Review of Systems   Respiratory: Positive for shortness of breath  Neurological: Positive for dizziness and light-headedness     All other systems reviewed and are negative  Physical Exam: Physical Exam  Vitals and nursing note reviewed  Constitutional:       General: She is not in acute distress  Cardiovascular:      Rate and Rhythm: Normal rate and regular rhythm  Pulses: Normal pulses  Heart sounds: Normal heart sounds  Pulmonary:      Effort: Pulmonary effort is normal  No respiratory distress  Breath sounds: Normal breath sounds  No stridor  No wheezing, rhonchi or rales  Abdominal:      General: Bowel sounds are normal  There is no distension  Palpations: Abdomen is soft  There is no mass  Tenderness: There is abdominal tenderness  There is no guarding or rebound  Hernia: No hernia is present  Comments: Tender to palpation in mid epigastric area  Musculoskeletal:      Right lower leg: No edema  Left lower leg: No edema  Skin:     General: Skin is warm and dry  Capillary Refill: Capillary refill takes less than 2 seconds  Coloration: Skin is not jaundiced or pale  Neurological:      Mental Status: She is alert and oriented to person, place, and time     Psychiatric:         Mood and Affect: Mood normal            Lab Results:   Recent Results (from the past 24 hour(s))   CBC and differential    Collection Time: 05/24/22  4:39 PM   Result Value Ref Range    WBC 9 37 4 31 - 10 16 Thousand/uL    RBC 2 48 (L) 3 81 - 5 12 Million/uL    Hemoglobin 7 8 (L) 11 5 - 15 4 g/dL    Hematocrit 24 5 (L) 34 8 - 46 1 %    MCV 99 (H) 82 - 98 fL    MCH 31 5 26 8 - 34 3 pg    MCHC 31 8 31 4 - 37 4 g/dL    RDW 14 9 11 6 - 15 1 %    MPV 9 5 8 9 - 12 7 fL    Platelets 681 (H) 224 - 390 Thousands/uL    nRBC 0 /100 WBCs    Neutrophils Relative 59 43 - 75 %    Immat GRANS % 1 0 - 2 %    Lymphocytes Relative 22 14 - 44 %    Monocytes Relative 10 4 - 12 %    Eosinophils Relative 7 (H) 0 - 6 %    Basophils Relative 1 0 - 1 %    Neutrophils Absolute 5 60 1 85 - 7 62 Thousands/µL    Immature Grans Absolute 0 07 0 00 - 0 20 Thousand/uL    Lymphocytes Absolute 2 07 0 60 - 4 47 Thousands/µL    Monocytes Absolute 0 94 0 17 - 1 22 Thousand/µL    Eosinophils Absolute 0 64 (H) 0 00 - 0 61 Thousand/µL    Basophils Absolute 0 05 0 00 - 0 10 Thousands/µL   Comprehensive metabolic panel    Collection Time: 05/24/22  4:39 PM   Result Value Ref Range    Sodium 131 (L) 136 - 145 mmol/L    Potassium 4 5 3 5 - 5 3 mmol/L    Chloride 100 100 - 108 mmol/L    CO2 23 21 - 32 mmol/L    ANION GAP 8 4 - 13 mmol/L    BUN 15 5 - 25 mg/dL    Creatinine 1 14 0 60 - 1 30 mg/dL    Glucose 95 65 - 140 mg/dL    Calcium 8 9 8 3 - 10 1 mg/dL    Corrected Calcium 9 9 8 3 - 10 1 mg/dL    AST 32 5 - 45 U/L    ALT 29 12 - 78 U/L    Alkaline Phosphatase 71 46 - 116 U/L    Total Protein 7 2 6 4 - 8 2 g/dL    Albumin 2 7 (L) 3 5 - 5 0 g/dL    Total Bilirubin 0 37 0 20 - 1 00 mg/dL    eGFR 46 ml/min/1 73sq m   Magnesium    Collection Time: 05/24/22  4:39 PM   Result Value Ref Range    Magnesium 2 2 1 6 - 2 6 mg/dL   HS Troponin 0hr (reflex protocol)    Collection Time: 05/24/22  4:39 PM   Result Value Ref Range    hs TnI 0hr 45 "Refer to ACS Flowchart"- see link ng/L   NT-BNP PRO    Collection Time: 05/24/22  4:39 PM   Result Value Ref Range    NT-proBNP 1,527 (H) <450 pg/mL   D-Dimer    Collection Time: 05/24/22  4:39 PM   Result Value Ref Range    D-Dimer, Quant 2 45 (H) <0 50 ug/ml FEU   UA (URINE) with reflex to Scope    Collection Time: 05/24/22  4:39 PM   Result Value Ref Range    Color, UA Light Yellow     Clarity, UA Clear     Specific Gravity, UA <=1 005 1 000 - 1 030    pH, UA 6 0 5 0, 5 5, 6 0, 6 5, 7 0, 7 5, 8 0, 8 5, 9 0    Leukocytes, UA Negative Negative    Nitrite, UA Negative Negative    Protein, UA Negative Negative mg/dl    Glucose, UA Negative Negative mg/dl    Ketones, UA Negative Negative mg/dl    Urobilinogen, UA 0 2 0 2, 1 0 E U /dl E U /dl    Bilirubin, UA Negative Negative    Blood, UA Moderate (A) Negative   TSH, 3rd generation with Free T4 reflex    Collection Time: 05/24/22  4:39 PM   Result Value Ref Range    TSH 3RD GENERATON 3 492 0 450 - 4 500 uIU/mL   Urine Microscopic    Collection Time: 05/24/22  4:39 PM   Result Value Ref Range    RBC, UA 1-2 None Seen, 0-1, 1-2, 2-4, 0-5 /hpf    WBC, UA 0-1 None Seen, 0-1, 1-2, 0-5, 2-4 /hpf    Epithelial Cells Occasional None Seen, Occasional /hpf    Bacteria, UA None Seen None Seen, Occasional /hpf    AMORPH URATES Occasional /hpf   Vitamin B12    Collection Time: 05/24/22  4:39 PM   Result Value Ref Range    Vitamin B-12 1,300 (H) 100 - 900 pg/mL   Folate    Collection Time: 05/24/22  4:39 PM   Result Value Ref Range    Folate 17 8 (H) 3 1 - 17 5 ng/mL   Sodium, urine, random    Collection Time: 05/24/22  4:39 PM   Result Value Ref Range    Sodium, Ur 25    Osmolality, urine    Collection Time: 05/24/22  4:39 PM   Result Value Ref Range    Osmolality, Ur 190 (L) 250 - 900 mmol/KG   Iron Saturation %    Collection Time: 05/24/22  4:39 PM   Result Value Ref Range    Iron Saturation 19 15 - 50 %    TIBC 183 (L) 250 - 450 ug/dL    Iron 34 (L) 50 - 170 ug/dL   Ferritin    Collection Time: 05/24/22  4:39 PM   Result Value Ref Range    Ferritin 546 (H) 8 - 388 ng/mL   Type and screen    Collection Time: 05/24/22  5:56 PM   Result Value Ref Range    ABO Grouping B     Rh Factor Positive     Antibody Screen Negative     Specimen Expiration Date [id number]    [facility] Recheck - Contact Blood Bank Prior to Collection    Collection Time: 05/24/22  6:30 PM   Result Value Ref Range    ABO Grouping B     Rh Factor Positive    Prepare Leukoreduced RBC: 1 Units    Collection Time: 05/24/22  7:46 PM   Result Value Ref Range    Unit Product Code F3663T79     Unit Number [id number]     Unit ABO B     Unit RH NEG     Crossmatch Compatible     Unit Dispense Status Return to Inv     Unit Product Volume 362 ml   HS Troponin I 2hr    Collection Time: 05/24/22  7:59 PM   Result Value Ref Range    hs TnI 2hr 63 (H) "Refer to ACS Flowchart"- see link ng/L    Delta 2hr hsTnI 18 <20 ng/L   HS Troponin I 4hr    Collection Time: 05/24/22  9:06 PM   Result Value Ref Range    hs TnI 4hr 73 (H) "Refer to ACS Flowchart"- see link ng/L    Delta 4hr hsTnI 28 (H) <20 ng/L   Osmolality-"If this is regarding a toxic alcohol, STOP  Test is not routinely indicated   Please consult medical  on call for further guidance "    Collection Time: 05/24/22  9:06 PM   Result Value Ref Range    Osmolality Serum 288 282 - 298 mmol/KG   Uric acid    Collection Time: 05/24/22  9:06 PM   Result Value Ref Range    Uric Acid 4 2 2 0 - 6 8 mg/dL   CBC    Collection Time: 05/25/22  5:44 AM   Result Value Ref Range    WBC 7 38 4 31 - 10 16 Thousand/uL    RBC 2 58 (L) 3 81 - 5 12 Million/uL    Hemoglobin 8 1 (L) 11 5 - 15 4 g/dL    Hematocrit 24 8 (L) 34 8 - 46 1 %    MCV 96 82 - 98 fL    MCH 31 4 26 8 - 34 3 pg    MCHC 32 7 31 4 - 37 4 g/dL    RDW 15 7 (H) 11 6 - 15 1 %    Platelets 195 (H) 815 - 390 Thousands/uL    MPV 9 0 8 9 - 12 7 fL   Basic metabolic panel    Collection Time: 05/25/22  5:44 AM   Result Value Ref Range    Sodium 139 136 - 145 mmol/L    Potassium 3 6 3 5 - 5 3 mmol/L    Chloride 106 100 - 108 mmol/L    CO2 24 21 - 32 mmol/L    ANION GAP 9 4 - 13 mmol/L    BUN 14 5 - 25 mg/dL    Creatinine 0 98 0 60 - 1 30 mg/dL    Glucose 93 65 - 140 mg/dL    Glucose, Fasting 93 65 - 99 mg/dL    Calcium 8 2 (L) 8 3 - 10 1 mg/dL    eGFR 55 ml/min/1 73sq m   Magnesium    Collection Time: 05/25/22  5:44 AM   Result Value Ref Range    Magnesium 2 0 1 6 - 2 6 mg/dL   T4, free    Collection Time: 05/25/22  5:44 AM   Result Value Ref Range    Free T4 1 31 0 76 - 1 46 ng/dL   Prepare Leukoreduced RBC: 1 Units    Collection Time: 05/25/22  6:15 AM   Result Value Ref Range    Unit Product Code V0920M75     Unit Number [id number]     Unit ABO B     Unit RH POS     Crossmatch Compatible     Unit Dispense Status Presumed Trans     Unit Product Volume 350 mL Echo complete w/ contrast if indicated    Collection Time: 05/25/22  9:15 AM   Result Value Ref Range    LA size 3 3 cm    Triscuspid Valve Regurgitation Peak Gradient 25 0 mmHg    Tricuspid valve peak regurgitation velocity 2 52 m/s    LVPWd 0 90 cm    Left Atrium Area-systolic Four Chamber 08 5 cm2    MV E' Tissue Velocity Septal 10 cm/s    MV E' Tissue Velocity Lateral 9 cm/s    TR Peak Jack 2 5 m/s    IVSd 1 00 cm    LV DIASTOLIC VOLUME (MOD BIPLANE) 2D 69 mL    LEFT VENTRICLE SYSTOLIC VOLUME (MOD BIPLANE) 2D 31 mL    Left ventricular stroke volume (2D) 38 00 mL    LVIDd 4 00 cm    IVS 0 9 cm    LVIDS 2 80 cm    FS 30 28 - 44 %    Ao root 3 00 cm    RVID d 3 1 cm    MV valve area p 1/2 method 3 38 cm2    E wave deceleration time 225 ms    MV Peak E Jack 80 cm/s    MV Peak A Jack 0 91 m/s    MV stenosis pressure 1/2 time 65 ms    LVSV, 2D 38 mL           Imaging Studies: CT chest abdomen pelvis wo contrast    Result Date: 5/24/2022  Narrative: CT CHEST, ABDOMEN AND PELVIS WITHOUT IV CONTRAST INDICATION:   trauma x 24hrs/constipation  COMPARISON:  None  TECHNIQUE: CT examination of the chest, abdomen and pelvis was performed without intravenous contrast  This examination was performed without intravenous contrast in the context of the critical nationwide Omnipaque shortage  Axial, sagittal, and coronal 2D reformatted images were created from the source data and submitted for interpretation  3D reconstructions of the bony thorax were performed in order to improved sensitivity of evaluation for rib fractures  Radiation dose length product (DLP) for this visit:  303 21 mGy-cm   This examination, like all CT scans performed in the [facility], was performed utilizing techniques to minimize radiation dose exposure, including the use of iterative  reconstruction and automated exposure control  Enteric contrast was administered   FINDINGS: CHEST LUNGS:  Pleural-parenchymal scarring and pleural thickening both lung apices, increased from 2019  Peripheral scarring and atelectasis appears similar to prior exam  PLEURA:  Diffuse pleural thickening and scarring more pronounced in the right lung apex than previously  HEART/GREAT VESSELS: Cardiac size within normal limits  Coronary and aortic atherosclerosis  No thoracic aortic aneurysm  MEDIASTINUM AND GAEL:  Unremarkable  CHEST WALL AND LOWER NECK:  Unremarkable  ABDOMEN LIVER/BILIARY TREE:  Mild fatty changes  GALLBLADDER:  Gallstones without inflammation  SPLEEN:  Unremarkable  PANCREAS:  Unremarkable  ADRENAL GLANDS:  Unremarkable  KIDNEYS/URETERS:  Stable appearance  No obvious injury  STOMACH AND BOWEL:  Limited evaluation of GI tract without oral contrast   Postop changes compatible with Savage-en-Y gastric bypass bariatric surgery  No obvious bowel injury  No injury is seen  APPENDIX:  No findings to suggest appendicitis  ABDOMINOPELVIC CAVITY:  Small volume ascites in the pelvis measuring water attenuation  No free air  No lymphadenopathy  VESSELS:  Unremarkable for patient's age  PELVIS REPRODUCTIVE ORGANS:  Unremarkable for patient's age  URINARY BLADDER:  Mildly distended  ABDOMINAL WALL/INGUINAL REGIONS:  Unremarkable  OSSEOUS STRUCTURES:  Multilevel degenerative changes  No convincing fractures  Impression: Limited exam performed without oral or IV contrast  No visceral or osseous injury identified  Pleural-parenchymal scarring and pleural thickening both lung apices, increased from 2019  Consider follow-up chest CT in 6 months to assess for progression  Mild fatty changes in liver  Expected postoperative changes  Cholelithiasis without CT evidence of cholecystitis  Small volume water attenuation ascites  Workstation performed: [id number]     CT head without contrast    Result Date: 5/24/2022  Narrative: CT BRAIN - WITHOUT CONTRAST INDICATION:   Head trauma, minor (Age >= 65y) trauma   COMPARISON:  April 2, 2013 TECHNIQUE:  CT examination of the brain was performed  In addition to axial images, sagittal and coronal 2D reformatted images were created and submitted for interpretation  Radiation dose length product (DLP) for this visit:  858 45 mGy-cm   This examination, like all CT scans performed in the [address] [place] [address], was performed utilizing techniques to minimize radiation dose exposure, including the use of iterative  reconstruction and automated exposure control  IMAGE QUALITY:  Diagnostic  FINDINGS: PARENCHYMA: Decreased attenuation is noted in periventricular and subcortical white matter demonstrating an appearance that is statistically most likely to represent mild microangiopathic change  No CT signs of acute infarction  No intracranial mass, mass effect or midline shift  No acute parenchymal hemorrhage  VENTRICLES AND EXTRA-AXIAL SPACES:  Normal for the patient's age  VISUALIZED ORBITS AND PARANASAL SINUSES:  No acute abnormality involving the orbits  Mild scattered sinus mucosal thickening is noted  No fluid levels are seen  CALVARIUM AND EXTRACRANIAL SOFT TISSUES:  Opacification of some of the right-sided mastoid air cells which is chronic; and to lesser extent a few left side mastoid air cells which is new  No calvarial fracture or soft tissue injury  Impression: No acute intracranial abnormality  Workstation performed: [id number]     CT cervical spine without contrast    Result Date: 5/24/2022  Narrative: CT CERVICAL SPINE - WITHOUT CONTRAST INDICATION:   Neck trauma (Age >= 65y) trauma  COMPARISON:  April 2, 2013 TECHNIQUE:  CT examination of the cervical spine was performed without intravenous contrast   Contiguous axial images were obtained  Sagittal and coronal reconstructions were performed  Radiation dose length product (DLP) for this visit:  236 01 mGy-cm     This examination, like all CT scans performed in the [address] [place] [address], was performed utilizing techniques to minimize radiation dose exposure, including the use of iterative  reconstruction and automated exposure control  IMAGE QUALITY:  Diagnostic  FINDINGS: ALIGNMENT:  Normal alignment of the cervical spine  No subluxation  VERTEBRAL BODIES:  No fracture  DEGENERATIVE CHANGES:  Moderate multilevel cervical degenerative changes are noted  No critical central canal stenosis  PREVERTEBRAL AND PARASPINAL SOFT TISSUES:  Unremarkable  THORACIC INLET:  Pleural-parenchymal scarring both lung apices appears similar Chronic bilateral mastoid disease  Impression: No cervical spine fracture or traumatic malalignment  Workstation performed: [id number]     CTA ED chest PE study    Result Date: 5/24/2022  Narrative: CTA - CHEST WITH IV CONTRAST - PULMONARY ANGIOGRAM INDICATION:   sob  "Patient here with complaint of increasing shortness of breath for the past few months  She states the symptoms worsen with exertion  She also c/o persistent bony pain  " COMPARISON: CT chest abdomen pelvis 5/24/2022 TECHNIQUE: CTA examination of the chest was performed using angiographic technique according to a protocol specifically tailored to evaluate for pulmonary embolism  Axial, sagittal, and coronal 2D reformatted images were created from the source data and  submitted for interpretation  In addition, coronal 3D MIP postprocessing was performed on the acquisition scanner  Radiation dose length product (DLP) for this visit:  301 52 mGy-cm   This examination, like all CT scans performed in the [facility], was performed utilizing techniques to minimize radiation dose exposure, including the use of iterative  reconstruction and automated exposure control  IV Contrast:  70 mL of iohexol (OMNIPAQUE)  FINDINGS: PULMONARY ARTERIAL TREE:  No pulmonary embolus is seen  LUNGS:  No focal infiltrate or consolidation    Mild mosaic attenuation of the pulmonary parenchyma most prominent through the upper lobes likely on the basis of a chronic small vessel or small airway disease, possibly smoking-related  PLEURA:  Unremarkable  HEART/GREAT VESSELS:  Unremarkable for patient's age  No thoracic aortic aneurysm  MEDIASTINUM AND GAEL:  Unremarkable  CHEST WALL AND LOWER NECK:   Unremarkable  VISUALIZED STRUCTURES IN THE UPPER ABDOMEN:  Status post gastric bypass  OSSEOUS STRUCTURES:  No acute fracture or destructive osseous lesion  Impression: No acute finding; specifically, no acute pulmonary arterial embolism  Workstation performed: [id number]       Problem List:   Patient Active Problem List   Diagnosis    Other specified hypothyroidism    Gastroesophageal reflux disease without esophagitis    Mixed anxiety depressive disorder    Fibromyalgia, primary    Iron deficiency    Numbness of foot    Dysphasia    Epigastric pain    COVID-19    Acute bronchitis    Shortness of breath    Medicare annual wellness visit, subsequent    Fibromyalgia syndrome    Osteopenia of both forearms    Cerumen debris on tympanic membrane of right ear    Nasal congestion    Bilateral hearing loss    Anemia    Dyspnea on exertion    Generalized weakness    Elevated brain natriuretic peptide (BNP) level    Hyponatremia    Abnormal CT scan    History of Savage-en-Y gastric bypass         TOLU Conklin      Please Note: "This note has been constructed using a voice recognition system  Therefore there may be syntax, spelling, and/or grammatical errors   Please call if you have any questions  "**

## 2022-05-25 NOTE — H&P (VIEW-ONLY)
Physician Requesting Consult: Hina Ruff DO    Reason for Consult / Principal Problem:  Symptomatic anemia    Assessment/Plan:  25-year-old female with past medical history of anxiety, gastric bypass, bleeding gastric ulcers, hypothyroidism, and osteoarthritis who presents to [facility] on 05/24 with report of generalized weakness, lightheadedness, dizziness and shortness of breath with exertion  1  Symptomatic anemia  Patient presented with symptomatic anemia  Hemoglobin and emergent room 7 8 hemoglobin this a m  8 1  Patient's baseline hemoglobin 12 7  Patient does report a history of iron deficient anemia but has not taking her iron for some time  Anemia most likely multifactorial but need to rule out GI blood losses contributing factor  Patient denies any signs of GI bleed  Patient denies bright red blood in stool, bright red blood from rectal area, or black tarry stool  No nausea or vomiting  Mild tenderness to palpation in mid epigastric  -Monitor hemoglobin  -Transfuse blood products as needed to keep hemoglobin greater than 7  -Monitor signs of GI bleed  -Schedule for EGD and colonoscopy tomorrow prep and procedure explained to patient in detail  Further recommendations pending results of EGD and colonoscopy  -Continue supportive care  -Iron supplements per attending    Will follow  Thank you for the consult  Case discussed with Dr Lennie Mckee      HPI: Дмитрий Aquino is a 64 y o  female  Anemia  This is a 25-year-old female with past medical history of anxiety, gastric bypass, bleeding gastric ulcers, hypothyroidism, and osteoarthritis who presents to [facility] on 05/24 with report of generalized weakness, lightheadedness, dizziness and shortness of breath with exertion  Lab work in emergency room showed hemoglobin 7 8  Hemoglobin 8 1 this a kaylie Aguilera Baseline hemoglobin 12 7    Patient does have a history of iron deficient anemia but reports she has not been taking her iron supplement slightly  Patient denies any signs of active GI bleed  Patient denies nausea, vomiting, acid reflux, heartburn, dysphagia, epigastric or abdominal pain  Patient denies blood in stool, blood from rectal area, or black tarry stool  Patient reported last week she had a a few days of diarrhea  Patient stated she was her last bowel movement was on Sunday  Patient is a former smoker she quit in [facility] 1  Patient reports she drinks wine on rare occasions only a few times a year  Patient stated last time she had any alcohol to drink was at [place]  No illicit drug use  Denies any family history of stomach or colon cancer  Last colonoscopy 09/30/2019 which showed normal colonoscopy  Biopsies obtained to rule out collagenous/microscopic colitis  Biopsy showed colonic mucosa with melanosis coli  No evidence of microscopic colitis or acute inflammation  Negative for dysplasia and malignancy  Last EGD 07/07/2021 which showed normal esophagus  History of gastric bypass surgery  Mild gastritis status post biopsies  Ulcer seen on previous endoscopy were healed  Normal jejunum      Allergies: No Known Allergies    Medications:  Current Facility-Administered Medications:     acetaminophen (TYLENOL) tablet 650 mg, 650 mg, Oral, Q6H PRN, Otis Blancarik, CRNP    clonazePAM (KlonoPIN) tablet 3 mg, 3 mg, Oral, HS, Otis Blancarik, CRNP, 3 mg at 05/24/22 2113    ferrous gluconate (FERGON) tablet 324 mg, 324 mg, Oral, Daily With Breakfast, Otis Blancarik, CRNP, 324 mg at 05/25/22 1230    levothyroxine tablet 112 mcg, 112 mcg, Oral, Early Morning, Otis Blancarik, CRNP, 112 mcg at 05/25/22 0535    ondansetron (ZOFRAN) injection 4 mg, 4 mg, Intravenous, Q6H PRN, Otis Blancarik, CRNP    sodium chloride 0 9 % infusion, 50 mL/hr, Intravenous, Continuous, TOLU Moore, Last Rate: 50 mL/hr at 05/25/22 0226, 50 mL/hr at 05/25/22 0226    traZODone (DESYREL) tablet 150 mg, 150 mg, Oral, HS, PING MooreNP, 150 mg at 22    Past Medical history:  Past Medical History:   Diagnosis Date    Anemia     Anxiety     Bipolar disorder ([address] )     Colon polyp     Disease of thyroid gland     Essential hypertension     Essential tremor     Fibromyalgia, primary     GERD (gastroesophageal reflux disease)     Inflammatory polyarthropathy (HCC)     Mammogram abnormal        Past Surgical History:   Past Surgical History:   Procedure Laterality Date    BREAST BIOPSY Right 2015    benign    CARPAL TUNNEL RELEASE Bilateral     COLONOSCOPY      EGD AND COLONOSCOPY  2014    GASTRIC BYPASS  2012    MAMMO NEEDLE LOCALIZATION RIGHT (ALL INC) Right 2012    MAMMO NEEDLE LOCALIZATION RIGHT (ALL INC) Right 2012    ULNAR NERVE TRANSPOSITION Right        Social history:   Social History     Tobacco Use    Smoking status: Former Smoker     Quit date:      Years since quittin 4    Smokeless tobacco: Never Used   Vaping Use    Vaping Use: Never used   Substance Use Topics    Alcohol use: Yes     Alcohol/week: 4 0 standard drinks     Types: 4 Glasses of wine per week     Comment: rare    Drug use: Never       Family history:   Family History   Problem Relation Age of Onset    No Known Problems Mother     No Known Problems Father     No Known Problems Sister     No Known Problems Maternal Grandmother     No Known Problems Maternal Grandfather     No Known Problems Paternal Grandmother     No Known Problems Paternal Grandfather     No Known Problems Sister     No Known Problems Sister     Stomach cancer Maternal Aunt     No Known Problems Maternal Aunt     No Known Problems Maternal Aunt     No Known Problems Maternal Aunt     No Known Problems Paternal Aunt     Leukemia Other         Review of Systems: Review of Systems   Respiratory: Positive for shortness of breath  Neurological: Positive for dizziness and light-headedness     All other systems reviewed and are negative  Physical Exam: Physical Exam  Vitals and nursing note reviewed  Constitutional:       General: She is not in acute distress  Cardiovascular:      Rate and Rhythm: Normal rate and regular rhythm  Pulses: Normal pulses  Heart sounds: Normal heart sounds  Pulmonary:      Effort: Pulmonary effort is normal  No respiratory distress  Breath sounds: Normal breath sounds  No stridor  No wheezing, rhonchi or rales  Abdominal:      General: Bowel sounds are normal  There is no distension  Palpations: Abdomen is soft  There is no mass  Tenderness: There is abdominal tenderness  There is no guarding or rebound  Hernia: No hernia is present  Comments: Tender to palpation in mid epigastric area  Musculoskeletal:      Right lower leg: No edema  Left lower leg: No edema  Skin:     General: Skin is warm and dry  Capillary Refill: Capillary refill takes less than 2 seconds  Coloration: Skin is not jaundiced or pale  Neurological:      Mental Status: She is alert and oriented to person, place, and time     Psychiatric:         Mood and Affect: Mood normal            Lab Results:   Recent Results (from the past 24 hour(s))   CBC and differential    Collection Time: 05/24/22  4:39 PM   Result Value Ref Range    WBC 9 37 4 31 - 10 16 Thousand/uL    RBC 2 48 (L) 3 81 - 5 12 Million/uL    Hemoglobin 7 8 (L) 11 5 - 15 4 g/dL    Hematocrit 24 5 (L) 34 8 - 46 1 %    MCV 99 (H) 82 - 98 fL    MCH 31 5 26 8 - 34 3 pg    MCHC 31 8 31 4 - 37 4 g/dL    RDW 14 9 11 6 - 15 1 %    MPV 9 5 8 9 - 12 7 fL    Platelets 172 (H) 712 - 390 Thousands/uL    nRBC 0 /100 WBCs    Neutrophils Relative 59 43 - 75 %    Immat GRANS % 1 0 - 2 %    Lymphocytes Relative 22 14 - 44 %    Monocytes Relative 10 4 - 12 %    Eosinophils Relative 7 (H) 0 - 6 %    Basophils Relative 1 0 - 1 %    Neutrophils Absolute 5 60 1 85 - 7 62 Thousands/µL    Immature Grans Absolute 0 07 0 00 - 0 20 Thousand/uL    Lymphocytes Absolute 2 07 0 60 - 4 47 Thousands/µL    Monocytes Absolute 0 94 0 17 - 1 22 Thousand/µL    Eosinophils Absolute 0 64 (H) 0 00 - 0 61 Thousand/µL    Basophils Absolute 0 05 0 00 - 0 10 Thousands/µL   Comprehensive metabolic panel    Collection Time: 05/24/22  4:39 PM   Result Value Ref Range    Sodium 131 (L) 136 - 145 mmol/L    Potassium 4 5 3 5 - 5 3 mmol/L    Chloride 100 100 - 108 mmol/L    CO2 23 21 - 32 mmol/L    ANION GAP 8 4 - 13 mmol/L    BUN 15 5 - 25 mg/dL    Creatinine 1 14 0 60 - 1 30 mg/dL    Glucose 95 65 - 140 mg/dL    Calcium 8 9 8 3 - 10 1 mg/dL    Corrected Calcium 9 9 8 3 - 10 1 mg/dL    AST 32 5 - 45 U/L    ALT 29 12 - 78 U/L    Alkaline Phosphatase 71 46 - 116 U/L    Total Protein 7 2 6 4 - 8 2 g/dL    Albumin 2 7 (L) 3 5 - 5 0 g/dL    Total Bilirubin 0 37 0 20 - 1 00 mg/dL    eGFR 46 ml/min/1 73sq m   Magnesium    Collection Time: 05/24/22  4:39 PM   Result Value Ref Range    Magnesium 2 2 1 6 - 2 6 mg/dL   HS Troponin 0hr (reflex protocol)    Collection Time: 05/24/22  4:39 PM   Result Value Ref Range    hs TnI 0hr 45 "Refer to ACS Flowchart"- see link ng/L   NT-BNP PRO    Collection Time: 05/24/22  4:39 PM   Result Value Ref Range    NT-proBNP 1,527 (H) <450 pg/mL   D-Dimer    Collection Time: 05/24/22  4:39 PM   Result Value Ref Range    D-Dimer, Quant 2 45 (H) <0 50 ug/ml FEU   UA (URINE) with reflex to Scope    Collection Time: 05/24/22  4:39 PM   Result Value Ref Range    Color, UA Light Yellow     Clarity, UA Clear     Specific Gravity, UA <=1 005 1 000 - 1 030    pH, UA 6 0 5 0, 5 5, 6 0, 6 5, 7 0, 7 5, 8 0, 8 5, 9 0    Leukocytes, UA Negative Negative    Nitrite, UA Negative Negative    Protein, UA Negative Negative mg/dl    Glucose, UA Negative Negative mg/dl    Ketones, UA Negative Negative mg/dl    Urobilinogen, UA 0 2 0 2, 1 0 E U /dl E U /dl    Bilirubin, UA Negative Negative    Blood, UA Moderate (A) Negative   TSH, 3rd generation with Free T4 reflex    Collection Time: 05/24/22  4:39 PM   Result Value Ref Range    TSH 3RD GENERATON 3 492 0 450 - 4 500 uIU/mL   Urine Microscopic    Collection Time: 05/24/22  4:39 PM   Result Value Ref Range    RBC, UA 1-2 None Seen, 0-1, 1-2, 2-4, 0-5 /hpf    WBC, UA 0-1 None Seen, 0-1, 1-2, 0-5, 2-4 /hpf    Epithelial Cells Occasional None Seen, Occasional /hpf    Bacteria, UA None Seen None Seen, Occasional /hpf    AMORPH URATES Occasional /hpf   Vitamin B12    Collection Time: 05/24/22  4:39 PM   Result Value Ref Range    Vitamin B-12 1,300 (H) 100 - 900 pg/mL   Folate    Collection Time: 05/24/22  4:39 PM   Result Value Ref Range    Folate 17 8 (H) 3 1 - 17 5 ng/mL   Sodium, urine, random    Collection Time: 05/24/22  4:39 PM   Result Value Ref Range    Sodium, Ur 25    Osmolality, urine    Collection Time: 05/24/22  4:39 PM   Result Value Ref Range    Osmolality, Ur 190 (L) 250 - 900 mmol/KG   Iron Saturation %    Collection Time: 05/24/22  4:39 PM   Result Value Ref Range    Iron Saturation 19 15 - 50 %    TIBC 183 (L) 250 - 450 ug/dL    Iron 34 (L) 50 - 170 ug/dL   Ferritin    Collection Time: 05/24/22  4:39 PM   Result Value Ref Range    Ferritin 546 (H) 8 - 388 ng/mL   Type and screen    Collection Time: 05/24/22  5:56 PM   Result Value Ref Range    ABO Grouping B     Rh Factor Positive     Antibody Screen Negative     Specimen Expiration Date [id number]    [facility] Recheck - Contact Blood Bank Prior to Collection    Collection Time: 05/24/22  6:30 PM   Result Value Ref Range    ABO Grouping B     Rh Factor Positive    Prepare Leukoreduced RBC: 1 Units    Collection Time: 05/24/22  7:46 PM   Result Value Ref Range    Unit Product Code Z5993T54     Unit Number [id number]     Unit ABO B     Unit RH NEG     Crossmatch Compatible     Unit Dispense Status Return to Inv     Unit Product Volume 362 ml   HS Troponin I 2hr    Collection Time: 05/24/22  7:59 PM   Result Value Ref Range    hs TnI 2hr 63 (H) "Refer to ACS Flowchart"- see link ng/L    Delta 2hr hsTnI 18 <20 ng/L   HS Troponin I 4hr    Collection Time: 05/24/22  9:06 PM   Result Value Ref Range    hs TnI 4hr 73 (H) "Refer to ACS Flowchart"- see link ng/L    Delta 4hr hsTnI 28 (H) <20 ng/L   Osmolality-"If this is regarding a toxic alcohol, STOP  Test is not routinely indicated   Please consult medical  on call for further guidance "    Collection Time: 05/24/22  9:06 PM   Result Value Ref Range    Osmolality Serum 288 282 - 298 mmol/KG   Uric acid    Collection Time: 05/24/22  9:06 PM   Result Value Ref Range    Uric Acid 4 2 2 0 - 6 8 mg/dL   CBC    Collection Time: 05/25/22  5:44 AM   Result Value Ref Range    WBC 7 38 4 31 - 10 16 Thousand/uL    RBC 2 58 (L) 3 81 - 5 12 Million/uL    Hemoglobin 8 1 (L) 11 5 - 15 4 g/dL    Hematocrit 24 8 (L) 34 8 - 46 1 %    MCV 96 82 - 98 fL    MCH 31 4 26 8 - 34 3 pg    MCHC 32 7 31 4 - 37 4 g/dL    RDW 15 7 (H) 11 6 - 15 1 %    Platelets 925 (H) 541 - 390 Thousands/uL    MPV 9 0 8 9 - 12 7 fL   Basic metabolic panel    Collection Time: 05/25/22  5:44 AM   Result Value Ref Range    Sodium 139 136 - 145 mmol/L    Potassium 3 6 3 5 - 5 3 mmol/L    Chloride 106 100 - 108 mmol/L    CO2 24 21 - 32 mmol/L    ANION GAP 9 4 - 13 mmol/L    BUN 14 5 - 25 mg/dL    Creatinine 0 98 0 60 - 1 30 mg/dL    Glucose 93 65 - 140 mg/dL    Glucose, Fasting 93 65 - 99 mg/dL    Calcium 8 2 (L) 8 3 - 10 1 mg/dL    eGFR 55 ml/min/1 73sq m   Magnesium    Collection Time: 05/25/22  5:44 AM   Result Value Ref Range    Magnesium 2 0 1 6 - 2 6 mg/dL   T4, free    Collection Time: 05/25/22  5:44 AM   Result Value Ref Range    Free T4 1 31 0 76 - 1 46 ng/dL   Prepare Leukoreduced RBC: 1 Units    Collection Time: 05/25/22  6:15 AM   Result Value Ref Range    Unit Product Code T8165Z88     Unit Number [id number]     Unit ABO B     Unit RH POS     Crossmatch Compatible     Unit Dispense Status Presumed Trans     Unit Product Volume 350 mL Echo complete w/ contrast if indicated    Collection Time: 05/25/22  9:15 AM   Result Value Ref Range    LA size 3 3 cm    Triscuspid Valve Regurgitation Peak Gradient 25 0 mmHg    Tricuspid valve peak regurgitation velocity 2 52 m/s    LVPWd 0 90 cm    Left Atrium Area-systolic Four Chamber 25 3 cm2    MV E' Tissue Velocity Septal 10 cm/s    MV E' Tissue Velocity Lateral 9 cm/s    TR Peak Jack 2 5 m/s    IVSd 2 29 cm    LV DIASTOLIC VOLUME (MOD BIPLANE) 2D 69 mL    LEFT VENTRICLE SYSTOLIC VOLUME (MOD BIPLANE) 2D 31 mL    Left ventricular stroke volume (2D) 38 00 mL    LVIDd 4 00 cm    IVS 0 9 cm    LVIDS 2 80 cm    FS 30 28 - 44 %    Ao root 3 00 cm    RVID d 3 1 cm    MV valve area p 1/2 method 3 38 cm2    E wave deceleration time 225 ms    MV Peak E Jack 80 cm/s    MV Peak A Jack 0 91 m/s    MV stenosis pressure 1/2 time 65 ms    LVSV, 2D 38 mL           Imaging Studies: CT chest abdomen pelvis wo contrast    Result Date: 5/24/2022  Narrative: CT CHEST, ABDOMEN AND PELVIS WITHOUT IV CONTRAST INDICATION:   trauma x 24hrs/constipation  COMPARISON:  None  TECHNIQUE: CT examination of the chest, abdomen and pelvis was performed without intravenous contrast  This examination was performed without intravenous contrast in the context of the critical nationwide Omnipaque shortage  Axial, sagittal, and coronal 2D reformatted images were created from the source data and submitted for interpretation  3D reconstructions of the bony thorax were performed in order to improved sensitivity of evaluation for rib fractures  Radiation dose length product (DLP) for this visit:  303 21 mGy-cm   This examination, like all CT scans performed in the [facility], was performed utilizing techniques to minimize radiation dose exposure, including the use of iterative  reconstruction and automated exposure control  Enteric contrast was administered   FINDINGS: CHEST LUNGS:  Pleural-parenchymal scarring and pleural thickening both lung apices, increased from 2019  Peripheral scarring and atelectasis appears similar to prior exam  PLEURA:  Diffuse pleural thickening and scarring more pronounced in the right lung apex than previously  HEART/GREAT VESSELS: Cardiac size within normal limits  Coronary and aortic atherosclerosis  No thoracic aortic aneurysm  MEDIASTINUM AND GAEL:  Unremarkable  CHEST WALL AND LOWER NECK:  Unremarkable  ABDOMEN LIVER/BILIARY TREE:  Mild fatty changes  GALLBLADDER:  Gallstones without inflammation  SPLEEN:  Unremarkable  PANCREAS:  Unremarkable  ADRENAL GLANDS:  Unremarkable  KIDNEYS/URETERS:  Stable appearance  No obvious injury  STOMACH AND BOWEL:  Limited evaluation of GI tract without oral contrast   Postop changes compatible with Savage-en-Y gastric bypass bariatric surgery  No obvious bowel injury  No injury is seen  APPENDIX:  No findings to suggest appendicitis  ABDOMINOPELVIC CAVITY:  Small volume ascites in the pelvis measuring water attenuation  No free air  No lymphadenopathy  VESSELS:  Unremarkable for patient's age  PELVIS REPRODUCTIVE ORGANS:  Unremarkable for patient's age  URINARY BLADDER:  Mildly distended  ABDOMINAL WALL/INGUINAL REGIONS:  Unremarkable  OSSEOUS STRUCTURES:  Multilevel degenerative changes  No convincing fractures  Impression: Limited exam performed without oral or IV contrast  No visceral or osseous injury identified  Pleural-parenchymal scarring and pleural thickening both lung apices, increased from 2019  Consider follow-up chest CT in 6 months to assess for progression  Mild fatty changes in liver  Expected postoperative changes  Cholelithiasis without CT evidence of cholecystitis  Small volume water attenuation ascites  Workstation performed: [id number]     CT head without contrast    Result Date: 5/24/2022  Narrative: CT BRAIN - WITHOUT CONTRAST INDICATION:   Head trauma, minor (Age >= 65y) trauma   COMPARISON:  April 2, 2013 TECHNIQUE:  CT examination of the brain was performed  In addition to axial images, sagittal and coronal 2D reformatted images were created and submitted for interpretation  Radiation dose length product (DLP) for this visit:  858 45 mGy-cm   This examination, like all CT scans performed in the [facility], was performed utilizing techniques to minimize radiation dose exposure, including the use of iterative  reconstruction and automated exposure control  IMAGE QUALITY:  Diagnostic  FINDINGS: PARENCHYMA: Decreased attenuation is noted in periventricular and subcortical white matter demonstrating an appearance that is statistically most likely to represent mild microangiopathic change  No CT signs of acute infarction  No intracranial mass, mass effect or midline shift  No acute parenchymal hemorrhage  VENTRICLES AND EXTRA-AXIAL SPACES:  Normal for the patient's age  VISUALIZED ORBITS AND PARANASAL SINUSES:  No acute abnormality involving the orbits  Mild scattered sinus mucosal thickening is noted  No fluid levels are seen  CALVARIUM AND EXTRACRANIAL SOFT TISSUES:  Opacification of some of the right-sided mastoid air cells which is chronic; and to lesser extent a few left side mastoid air cells which is new  No calvarial fracture or soft tissue injury  Impression: No acute intracranial abnormality  Workstation performed: [id number]     CT cervical spine without contrast    Result Date: 5/24/2022  Narrative: CT CERVICAL SPINE - WITHOUT CONTRAST INDICATION:   Neck trauma (Age >= 65y) trauma  COMPARISON:  April 2, 2013 TECHNIQUE:  CT examination of the cervical spine was performed without intravenous contrast   Contiguous axial images were obtained  Sagittal and coronal reconstructions were performed  Radiation dose length product (DLP) for this visit:  236 01 mGy-cm     This examination, like all CT scans performed in the [facility], was performed utilizing techniques to minimize radiation dose exposure, including the use of iterative  reconstruction and automated exposure control  IMAGE QUALITY:  Diagnostic  FINDINGS: ALIGNMENT:  Normal alignment of the cervical spine  No subluxation  VERTEBRAL BODIES:  No fracture  DEGENERATIVE CHANGES:  Moderate multilevel cervical degenerative changes are noted  No critical central canal stenosis  PREVERTEBRAL AND PARASPINAL SOFT TISSUES:  Unremarkable  THORACIC INLET:  Pleural-parenchymal scarring both lung apices appears similar Chronic bilateral mastoid disease  Impression: No cervical spine fracture or traumatic malalignment  Workstation performed: [id number]     CTA ED chest PE study    Result Date: 5/24/2022  Narrative: CTA - CHEST WITH IV CONTRAST - PULMONARY ANGIOGRAM INDICATION:   sob  "Patient here with complaint of increasing shortness of breath for the past few months  She states the symptoms worsen with exertion  She also c/o persistent bony pain  " COMPARISON: CT chest abdomen pelvis 5/24/2022 TECHNIQUE: CTA examination of the chest was performed using angiographic technique according to a protocol specifically tailored to evaluate for pulmonary embolism  Axial, sagittal, and coronal 2D reformatted images were created from the source data and  submitted for interpretation  In addition, coronal 3D MIP postprocessing was performed on the acquisition scanner  Radiation dose length product (DLP) for this visit:  301 52 mGy-cm   This examination, like all CT scans performed in the [facility], was performed utilizing techniques to minimize radiation dose exposure, including the use of iterative  reconstruction and automated exposure control  IV Contrast:  70 mL of iohexol (OMNIPAQUE)  FINDINGS: PULMONARY ARTERIAL TREE:  No pulmonary embolus is seen  LUNGS:  No focal infiltrate or consolidation    Mild mosaic attenuation of the pulmonary parenchyma most prominent through the upper lobes likely on the basis of a chronic small vessel or small airway disease, possibly smoking-related  PLEURA:  Unremarkable  HEART/GREAT VESSELS:  Unremarkable for patient's age  No thoracic aortic aneurysm  MEDIASTINUM AND GAEL:  Unremarkable  CHEST WALL AND LOWER NECK:   Unremarkable  VISUALIZED STRUCTURES IN THE UPPER ABDOMEN:  Status post gastric bypass  OSSEOUS STRUCTURES:  No acute fracture or destructive osseous lesion  Impression: No acute finding; specifically, no acute pulmonary arterial embolism  Workstation performed: [id number]       Problem List:   Patient Active Problem List   Diagnosis    Other specified hypothyroidism    Gastroesophageal reflux disease without esophagitis    Mixed anxiety depressive disorder    Fibromyalgia, primary    Iron deficiency    Numbness of foot    Dysphasia    Epigastric pain    COVID-19    Acute bronchitis    Shortness of breath    Medicare annual wellness visit, subsequent    Fibromyalgia syndrome    Osteopenia of both forearms    Cerumen debris on tympanic membrane of right ear    Nasal congestion    Bilateral hearing loss    Anemia    Dyspnea on exertion    Generalized weakness    Elevated brain natriuretic peptide (BNP) level    Hyponatremia    Abnormal CT scan    History of Savage-en-Y gastric bypass         TOLU Conklin      Please Note: "This note has been constructed using a voice recognition system  Therefore there may be syntax, spelling, and/or grammatical errors   Please call if you have any questions  "**

## 2022-05-25 NOTE — ASSESSMENT & PLAN NOTE
Suspect due to anemia,+ component of anxiety  D-dimer 2 45  No PE on CTA  Continues to remain stable on room air  ProBNP 1527  No pulmonary edema on CT  No edema to lower extremity  Troponin 45-63  EKG normal sinus rhythm    Echo showed normal EF with grade 1 diastolic dysfunction

## 2022-05-25 NOTE — ASSESSMENT & PLAN NOTE
Suspect due to anemia,+ component of anxiety  D-dimer 2 45  No PE on CTA  ProBNP 1527  No pulmonary edema on CT  No edema to lower extremity  Troponin 45-63  Patient has vague complaint of chest pain  EKG normal sinus rhythm  Will check 2D echo  Trend troponin  Patient satting high 90s on room air

## 2022-05-25 NOTE — ASSESSMENT & PLAN NOTE
Pleural-parenchymal scarring and pleural thickening both lung apices, increased from 2019  Follow-up chest CT in 6 months to assess for progression  Fatty changes in liver, cholelithiasis

## 2022-05-25 NOTE — ASSESSMENT & PLAN NOTE
Likely multifactorial due to anemia, fibromyalgia, hyponatremia  Fell yesterday  CT head, C-spine, chest abdomen pelvis no acute traumatic findings  TSH normal   UA no evidence of infection  CT chest abdomen pelvis no evidence infection  Check B12  PT OT eval treat  Fall precautions

## 2022-05-25 NOTE — H&P
[facility], formerly [facility] 1945, 64 y o  female MRN: [id number]  Unit/Bed#: [address] [place] [address] Encounter: [id number]  Primary Care Provider: Iliana Llanos MD   Date and time admitted to hospital: 5/24/2022  3:37 PM    * Anemia  Assessment & Plan  Patient presents with generalized weakness, exertional dyspnea, dizziness for about 3 months  Fell backwards while sitting on edge of bathtub hitting head,neck and back last night  Workup showed hemoglobin 7 8  Hemoglobin 12 7 a year ago  History of gastric ulcers in 9/2019  Underwent EGD which showed - Savage en Y gastric bypass surgery anatomy  There were multiple cratered, linear ulcers in the body and at the anastomosis  Had normal colonoscopy at the same time  Repeat EGD in 5/2021 showed healed ulcers  Patient denies evidence of bleeding at home  Taking iron supplement whenever she remembers it  No longer taking Protonix  Suspect presenting symptoms due to anemia  Patient ordered 1 unit RBC in ED  Check iron profile, B12, folate, TSH, free T4, stool occult blood  Repeat H&H 2 hours post transfusion and in the morning  Consult GI        Dyspnea on exertion  Assessment & Plan  Suspect due to anemia,+ component of anxiety  D-dimer 2 45  No PE on CTA  ProBNP 1527  No pulmonary edema on CT  No edema to lower extremity  Troponin 45-63  Patient has vague complaint of chest pain  EKG normal sinus rhythm  Will check 2D echo  Telemetry  Trend troponin  Patient satting high 90s on room air  Hyponatremia  Assessment & Plan  Sodium 131  No prior history in epic  Likely due to poor intake  Try gentle IV hydration NS 50 cc/hour  Check urine sodium, urine Osmo, serum osmol, uric acid    TSH normal  Repeat BMP in the morning    Elevated brain natriuretic peptide (BNP) level  Assessment & Plan  Check 2D echo  Daily weight and I&Os    Generalized weakness  Assessment & Plan  Likely multifactorial due to anemia, fibromyalgia, hyponatremia  Cody Ventura yesterday  CT head, C-spine, chest abdomen pelvis no acute traumatic findings  TSH normal   UA no evidence of infection  CT chest abdomen pelvis no evidence infection  Check B12  PT OT eval treat  Fall precautions  History of Savage-en-Y gastric bypass  Assessment & Plan  Status post gastric bypass in 2010  Denies history of iron infusion  Abnormal CT scan  Assessment & Plan  Pleural-parenchymal scarring and pleural thickening both lung apices, increased from 2019  Consider follow-up chest CT in 6 months to assess for progression  Mild fatty changes in liver  - will check lipid panel    Cholelithiasis without CT evidence of cholecystitis  Fibromyalgia syndrome  Assessment & Plan  Patient reports whole body pain and having difficulty moving whole body/ambulating for a long time but worse in past 3 month  Saw orthopedic surgeon in February this year,was told to have bad osteoarthritis on knees  Patient underwent sodium hyaluronate injection to both knees in March this year  Not taking any medication at home  Tylenol p r n  Monitor for pain  PT OT eval treat    Mixed anxiety depressive disorder  Assessment & Plan  Continue Klonopin and trazodone at HS  Verified Klonopin dose in PDMP website    Other specified hypothyroidism  Assessment & Plan  Continue Synthroid  TSH normal  Free T4 pending        VTE Prophylaxis: Pharmacologic VTE Prophylaxis contraindicated due to Anemia  / sequential compression device   Code Status:  Full code  POLST: POLST form is not discussed and not completed at this time  Anticipated Length of Stay:  Patient will be admitted on an Observation basis with an anticipated length of stay of  < 2 midnights  Justification for Hospital Stay:  Anemia    Total Time for Visit, including Counseling / Coordination of Care: 1 hour  Greater than 50% of this total time spent on direct patient counseling and coordination of care      Chief Complaint:   Generalized weakness, exertional dyspnea, dizziness for about 3 months    History of Present Illness:    Josh Sever is a 64 y o  female with PMH of anxiety, gastric bypass, hypothyroid, osteoarthritis who presents with above complaints  Patient reports she is having pain in her whole body and having difficulty to moving around/ambulate, have generalized weakness  She saw orthopedic surgery recently, was told to have arthritis in both knees  She states she fell backwards while sitting on edge of bathtub last night, heating her head neck and back  Did not lose consciousness  Patient reports SOB when climbing stairs and go down stairs for past 3 months  Reports chest pain dizziness  Denies headache, nausea vomiting  Reports had diarrhea last week a few times but no BM since Sunday  Reports poor appetite for a while  Patient does not use assistive device to ambulate at home  Review of Systems:    Review of Systems   Constitutional: Positive for activity change, appetite change and fatigue  Respiratory:        Dyspnea on exertion   Cardiovascular: Positive for chest pain  Gastrointestinal:        Had diarrhea last week for few times   Musculoskeletal: Positive for arthralgias  Whole body pain   Neurological: Positive for dizziness  Psychiatric/Behavioral: The patient is nervous/anxious  All other systems reviewed and are negative        Past Medical and Surgical History:     Past Medical History:   Diagnosis Date    Anemia     Anxiety     Bipolar disorder ([facility] [address] )     Colon polyp     Disease of thyroid gland     Essential hypertension     Essential tremor     Fibromyalgia, primary     GERD (gastroesophageal reflux disease)     Inflammatory polyarthropathy (HCC)     Mammogram abnormal        Past Surgical History:   Procedure Laterality Date    BREAST BIOPSY Right 2015    benign    CARPAL TUNNEL RELEASE Bilateral     COLONOSCOPY      EGD AND COLONOSCOPY  01/01/2014    GASTRIC BYPASS 07/01/2012    MAMMO NEEDLE LOCALIZATION RIGHT ([employer]) Right 2/6/2012    MAMMO NEEDLE LOCALIZATION RIGHT (ALL INC) Right 2/6/2012    ULNAR NERVE TRANSPOSITION Right        Meds/Allergies:    Prior to Admission medications    Medication Sig Start Date End Date Taking? Authorizing Provider   clonazePAM (KlonoPIN) 1 mg tablet TK 3 TS PO Q NIGHT 6/19/19  Yes Historical Provider, MD   levothyroxine 112 mcg tablet TK 1 T PO QD 8/27/19  Yes Historical Provider, MD   traZODone (DESYREL) 50 mg tablet Take 150 mg by mouth daily at bedtime 7/22/19  Yes Historical Provider, MD   bisacodyl (DULCOLAX) 5 mg EC tablet Take 2 tablets (10 mg total) by mouth once for 1 dose Please take with start of miralax prep as per office instructions    Patient not taking: Reported on 12/14/2021 9/12/19 9/12/19  Sergio Roth PA-C   ferrous gluconate (FERGON) 324 mg tablet Take 1 tablet (324 mg total) by mouth daily with breakfast  Patient not taking: Reported on 5/24/2022 9/23/21   Dwayne Gallagher MD   fluticasone [facility]) 50 mcg/act nasal spray 1 spray into each nostril daily  Patient not taking: Reported on 5/24/2022 2/8/22   Dwayne Gallagher MD   naproxen (EC NAPROSYN) 500 MG EC tablet Take 1 tablet (500 mg total) by mouth 2 (two) times a day with meals  Patient not taking: Reported on 5/24/2022 4/21/22 5/21/22  Rosalba Wang PA-C   ofloxacin (FLOXIN) 0 3 % otic solution INSTILL 5 DROPS IN BOTH EARS TWICE DAILY FOR 3 DAYS  Patient not taking: Reported on 5/24/2022 3/8/22   Historical Provider, MD   pantoprazole (PROTONIX) 40 mg tablet Take 1 tablet (40 mg total) by mouth daily  Patient not taking: Reported on 5/24/2022 11/13/20   Dwayne Gallagher MD   sucralfate (CARAFATE) 1 g/10 mL suspension Take 10 mL (1 g total) by mouth 4 (four) times a day  Patient not taking: Reported on 5/24/2022 11/20/20   Anni Watkins PA-C   ARIPiprazole (ABILIFY) 2 mg tablet Take 2 mg by mouth daily at bedtime  Patient not taking: No sig reported 22  Historical Provider, MD   DULoxetine (CYMBALTA) 30 mg delayed release capsule Take 1 capsule (30 mg total) by mouth daily  Patient not taking: Reported on 2022  Trina Norwood MD     I have reviewed home medications with patient personally  Allergies: No Known Allergies    Social History:     Marital Status: Single   Occupation:  Retired  Patient Pre-hospital Living Situation:  Lives with   Patient Pre-hospital Level of Mobility:  With assist  Patient Pre-hospital Diet Restrictions:  Regular  Substance Use History:   Social History     Substance and Sexual Activity   Alcohol Use Yes    Alcohol/week: 4 0 standard drinks    Types: 4 Glasses of wine per week    Comment: rare     Social History     Tobacco Use   Smoking Status Former Smoker    Quit date:     Years since quittin 4   Smokeless Tobacco Never Used     Social History     Substance and Sexual Activity   Drug Use Never       Family History:    non-contributory    Physical Exam:     Vitals:   Blood Pressure: 130/64 (22)  Pulse: 82 (22)  Temperature: 97 8 °F (36 6 °C) (22)  Temp Source: Oral (22)  Respirations: 18 (22)  Weight - Scale: 54 4 kg (120 lb) (22 1436)  SpO2: 99 % (22)    Physical Exam  Vitals and nursing note reviewed  Constitutional:       Appearance: She is well-developed  Comments: Patient appears weak  HENT:      Head: Normocephalic and atraumatic  Neck:      Thyroid: No thyromegaly  Vascular: No JVD  Trachea: No tracheal deviation  Cardiovascular:      Rate and Rhythm: Normal rate and regular rhythm  Heart sounds: Normal heart sounds  Pulmonary:      Effort: Pulmonary effort is normal  No respiratory distress  Breath sounds: Normal breath sounds  No wheezing or rales  Abdominal:      General: Bowel sounds are normal  There is no distension  Palpations: Abdomen is soft  Tenderness: There is no abdominal tenderness  There is no guarding  Musculoskeletal:         General: No swelling or deformity  Normal range of motion  Cervical back: Neck supple  Right lower leg: No edema  Left lower leg: No edema  Skin:     General: Skin is warm and dry  Neurological:      General: No focal deficit present  Mental Status: She is alert and oriented to person, place, and time  Psychiatric:         Mood and Affect: Mood normal          Judgment: Judgment normal          Additional Data:     Lab Results: I have personally reviewed pertinent reports  Results from last 7 days   Lab Units 05/24/22  1639   WBC Thousand/uL 9 37   HEMOGLOBIN g/dL 7 8*   HEMATOCRIT % 24 5*   PLATELETS Thousands/uL 676*   NEUTROS PCT % 59   LYMPHS PCT % 22   MONOS PCT % 10   EOS PCT % 7*     Results from last 7 days   Lab Units 05/24/22  1639   POTASSIUM mmol/L 4 5   CHLORIDE mmol/L 100   CO2 mmol/L 23   BUN mg/dL 15   CREATININE mg/dL 1 14   CALCIUM mg/dL 8 9   ALK PHOS U/L 71   ALT U/L 29   AST U/L 32           Imaging: I have personally reviewed pertinent reports  CT chest abdomen pelvis wo contrast    Result Date: 5/24/2022  Narrative: CT CHEST, ABDOMEN AND PELVIS WITHOUT IV CONTRAST INDICATION:   trauma x 24hrs/constipation  COMPARISON:  None  TECHNIQUE: CT examination of the chest, abdomen and pelvis was performed without intravenous contrast  This examination was performed without intravenous contrast in the context of the critical nationwide Omnipaque shortage  Axial, sagittal, and coronal 2D reformatted images were created from the source data and submitted for interpretation  3D reconstructions of the bony thorax were performed in order to improved sensitivity of evaluation for rib fractures  Radiation dose length product (DLP) for this visit:  303 21 mGy-cm     This examination, like all CT scans performed in the [facility], was performed utilizing techniques to minimize radiation dose exposure, including the use of iterative  reconstruction and automated exposure control  Enteric contrast was administered  FINDINGS: CHEST LUNGS:  Pleural-parenchymal scarring and pleural thickening both lung apices, increased from 2019  Peripheral scarring and atelectasis appears similar to prior exam  PLEURA:  Diffuse pleural thickening and scarring more pronounced in the right lung apex than previously  HEART/GREAT VESSELS: Cardiac size within normal limits  Coronary and aortic atherosclerosis  No thoracic aortic aneurysm  MEDIASTINUM AND GAEL:  Unremarkable  CHEST WALL AND LOWER NECK:  Unremarkable  ABDOMEN LIVER/BILIARY TREE:  Mild fatty changes  GALLBLADDER:  Gallstones without inflammation  SPLEEN:  Unremarkable  PANCREAS:  Unremarkable  ADRENAL GLANDS:  Unremarkable  KIDNEYS/URETERS:  Stable appearance  No obvious injury  STOMACH AND BOWEL:  Limited evaluation of GI tract without oral contrast   Postop changes compatible with Savage-en-Y gastric bypass bariatric surgery  No obvious bowel injury  No injury is seen  APPENDIX:  No findings to suggest appendicitis  ABDOMINOPELVIC CAVITY:  Small volume ascites in the pelvis measuring water attenuation  No free air  No lymphadenopathy  VESSELS:  Unremarkable for patient's age  PELVIS REPRODUCTIVE ORGANS:  Unremarkable for patient's age  URINARY BLADDER:  Mildly distended  ABDOMINAL WALL/INGUINAL REGIONS:  Unremarkable  OSSEOUS STRUCTURES:  Multilevel degenerative changes  No convincing fractures  Impression: Limited exam performed without oral or IV contrast  No visceral or osseous injury identified  Pleural-parenchymal scarring and pleural thickening both lung apices, increased from 2019  Consider follow-up chest CT in 6 months to assess for progression  Mild fatty changes in liver  Expected postoperative changes  Cholelithiasis without CT evidence of cholecystitis   Small volume water attenuation ascites  Workstation performed: [id number]     CT head without contrast    Result Date: 5/24/2022  Narrative: CT BRAIN - WITHOUT CONTRAST INDICATION:   Head trauma, minor (Age >= 65y) trauma  COMPARISON:  April 2, 2013 TECHNIQUE:  CT examination of the brain was performed  In addition to axial images, sagittal and coronal 2D reformatted images were created and submitted for interpretation  Radiation dose length product (DLP) for this visit:  858 45 mGy-cm   This examination, like all CT scans performed in the [facility], was performed utilizing techniques to minimize radiation dose exposure, including the use of iterative  reconstruction and automated exposure control  IMAGE QUALITY:  Diagnostic  FINDINGS: PARENCHYMA: Decreased attenuation is noted in periventricular and subcortical white matter demonstrating an appearance that is statistically most likely to represent mild microangiopathic change  No CT signs of acute infarction  No intracranial mass, mass effect or midline shift  No acute parenchymal hemorrhage  VENTRICLES AND EXTRA-AXIAL SPACES:  Normal for the patient's age  VISUALIZED ORBITS AND PARANASAL SINUSES:  No acute abnormality involving the orbits  Mild scattered sinus mucosal thickening is noted  No fluid levels are seen  CALVARIUM AND EXTRACRANIAL SOFT TISSUES:  Opacification of some of the right-sided mastoid air cells which is chronic; and to lesser extent a few left side mastoid air cells which is new  No calvarial fracture or soft tissue injury  Impression: No acute intracranial abnormality  Workstation performed: [id number]     CT cervical spine without contrast    Result Date: 5/24/2022  Narrative: CT CERVICAL SPINE - WITHOUT CONTRAST INDICATION:   Neck trauma (Age >= 65y) trauma  COMPARISON:  April 2, 2013 TECHNIQUE:  CT examination of the cervical spine was performed without intravenous contrast   Contiguous axial images were obtained  Sagittal and coronal reconstructions were performed  Radiation dose length product (DLP) for this visit:  236 01 mGy-cm   This examination, like all CT scans performed in the [facility], was performed utilizing techniques to minimize radiation dose exposure, including the use of iterative  reconstruction and automated exposure control  IMAGE QUALITY:  Diagnostic  FINDINGS: ALIGNMENT:  Normal alignment of the cervical spine  No subluxation  VERTEBRAL BODIES:  No fracture  DEGENERATIVE CHANGES:  Moderate multilevel cervical degenerative changes are noted  No critical central canal stenosis  PREVERTEBRAL AND PARASPINAL SOFT TISSUES:  Unremarkable  THORACIC INLET:  Pleural-parenchymal scarring both lung apices appears similar Chronic bilateral mastoid disease  Impression: No cervical spine fracture or traumatic malalignment  Workstation performed: [id number]     CTA ED chest PE study    Result Date: 5/24/2022  Narrative: CTA - CHEST WITH IV CONTRAST - PULMONARY ANGIOGRAM INDICATION:   sob  "Patient here with complaint of increasing shortness of breath for the past few months  She states the symptoms worsen with exertion  She also c/o persistent bony pain  " COMPARISON: CT chest abdomen pelvis 5/24/2022 TECHNIQUE: CTA examination of the chest was performed using angiographic technique according to a protocol specifically tailored to evaluate for pulmonary embolism  Axial, sagittal, and coronal 2D reformatted images were created from the source data and  submitted for interpretation  In addition, coronal 3D MIP postprocessing was performed on the acquisition scanner  Radiation dose length product (DLP) for this visit:  301 52 mGy-cm   This examination, like all CT scans performed in the [facility], was performed utilizing techniques to minimize radiation dose exposure, including the use of iterative  reconstruction and automated exposure control   IV Contrast:  70 mL of iohexol (OMNIPAQUE)  FINDINGS: PULMONARY ARTERIAL TREE:  No pulmonary embolus is seen  LUNGS:  No focal infiltrate or consolidation  Mild mosaic attenuation of the pulmonary parenchyma most prominent through the upper lobes likely on the basis of a chronic small vessel or small airway disease, possibly smoking-related  PLEURA:  Unremarkable  HEART/GREAT VESSELS:  Unremarkable for patient's age  No thoracic aortic aneurysm  MEDIASTINUM AND GAEL:  Unremarkable  CHEST WALL AND LOWER NECK:   Unremarkable  VISUALIZED STRUCTURES IN THE UPPER ABDOMEN:  Status post gastric bypass  OSSEOUS STRUCTURES:  No acute fracture or destructive osseous lesion  Impression: No acute finding; specifically, no acute pulmonary arterial embolism  Workstation performed: [id number]       EKG, Pathology, and Other Studies Reviewed on Admission:   · EKG:  Normal sinus rhythm    All[employer] Records Reviewed: Yes     ** Please Note: Dragon 360 Dictation voice to text software may have been used in the creation of this document   **

## 2022-05-25 NOTE — ASSESSMENT & PLAN NOTE
Likely multifactorial due to anemia, fibromyalgia, hyponatremia  CT head, C-spine, chest abdomen pelvis no acute traumatic findings  TSH normal   UA negative for UTI  CT chest abdomen pelvis no evidence infection  Patient was seen by physical therapy  Patient is doing well    Will be discharged home with outpatient PT OOC NT incoming call -  Pt reports she is having EGD and colonoscopy on Monday and does not have prep called into pharmacy. NT unable to find any notes regarding prep and will need to call On call provider to clarify. -    1905 Spoke with Dr. Garcia - Instructed to have pt use 2 bottles of magnesium citrate and refer to her blue folder provided to her at last visit for further instructions.     1923 Return call placed to pt - Notified of above instruction per on call provider. Pt does have blue folder and will review for times to begin prep. NT did provide general instructions such as clear liquids only no red/orange products, NPO after midnight, Stop all blood thinners and take BP/heart meds at least 1 hour prior to procedure.        Colon prep protocol followed and Encounter routed to PCP and Dr. Alvarado. .    Reason for Disposition   Bowel prep for colonoscopy, questions about    Protocols used: COLONOSCOPY SYMPTOMS AND [id number]

## 2022-05-25 NOTE — ASSESSMENT & PLAN NOTE
Patient reports whole body pain and having difficulty moving whole body/ambulating for a long time but worse in past 3 month  Saw orthopedic surgeon in February this year,was told to have bad osteoarthritis on knees  Patient underwent sodium hyaluronate injection to both knees in March this year  Not taking any medication at home  Tylenol p r n    Monitor for pain  PT OT eval treat

## 2022-05-25 NOTE — OCCUPATIONAL THERAPY NOTE
Occupational Therapy Evaluation/Treatment Note       05/25/22 0940   Note Type   Note type Evaluation   Restrictions/Precautions   Other Precautions Chair Alarm; Bed Alarm; Fall Risk;Pain   Pain Assessment   Pain Assessment Tool Womack-Baker FACES   Womack-Baker FACES Pain Rating 4   Pain Location/Orientation Orientation: Bilateral;Location: Knee   Home Living   Type of Home House   Home Layout Two level; Laundry in basement;1/2 bath on main level;Bed/bath upstairs;Stairs to enter with rails  (2 WILLI)   Bathroom Shower/Tub Tub/shower unit   Bathroom Toilet Standard   Bathroom Equipment Other (Comment)  (None)   Home Equipment Other (Comment)  (None)   Additional Comments Patient presented to hospital with c/o SOB, symptomatic anemia   Prior Function   Level of [place] Independent with ADLs and functional mobility   Lives With Alone   Receives Help From Family   ADL Assistance Independent   IADLs Needs assistance   Comments Patient reports PTA independent in ADLs/mobility without AD; reports brother in law helps with grocery shopping and transportation; reports progressive weakness and DELUNA past several months   ADL   Eating Assistance 7  Independent   Grooming Assistance 5  Supervision/Setup   UB Bathing Assistance 5  Supervision/Setup   LB Bathing Assistance 4  Minimal Assistance   UB Dressing Assistance 5  Supervision/Setup    [facility] 4  [facility] [address]  5  Supervision/Setup   Bed Mobility   Supine to Sit 5  Supervision   Sit to Supine 5  Supervision   Transfers   Sit to Stand 4  Minimal assistance   Additional items Assist x 1   Stand to Sit 5  Supervision   Functional Mobility   Functional Mobility 4  Minimal assistance   Additional Comments Few feet bed to chair without AD   Balance   Static Sitting Good   Dynamic Sitting Fair +   Static Standing Fair   Dynamic Standing Fair   Activity Tolerance   Activity Tolerance Patient limited by fatigue   RUE Assessment   RUE Assessment WFL   LUE Assessment   LUE Assessment WFL   Cognition   Overall Cognitive Status WFL   Arousal/Participation Alert; Cooperative   Attention Attends with cues to redirect  (Easily distracted)   Orientation Level Oriented X4   Following Commands Follows multistep commands with increased time or repetition   Assessment   Limitation Decreased ADL status; Decreased UE strength;Decreased Safe judgement during ADL;Decreased endurance;Decreased self-care trans;Decreased high-level ADLs   Prognosis Good   Assessment Patient evaluated by Occupational Therapy  Patient admitted with Anemia  The patients occupational profile, medical and therapy history includes a extensive additional review of physical, cognitive, or psychosocial history related to current functional performance  Comorbidities affecting functional mobility and ADLS include: bipolar disorder, anxiety, fibromyalgia, anemia, GERD, colon polyp  Prior to admission, patient was independent with functional mobility without assistive device, independent with ADLS and requiring assist for IADLS  The evaluation identifies the following performance deficits: weakness, impaired balance, decreased endurance, increased fall risk, new onset of impairment of functional mobility, decreased ADLS, decreased IADLS, decreased activity tolerance, decreased safety awareness, impaired judgement and decreased strength, that result in activity limitations and/or participation restrictions  This evaluation requires clinical decision making of high complexity, because the patient presents with comorbidites that affect occupational performance and required significant modification of tasks or assistance with consideration of multiple treatment options  The Barthel Index was used as a functional outcome tool presenting with a score of Barthel Index Score: 70, indicating marked limitations of functional mobility and ADLS    The patient's raw score on the -PAC Daily Activity inpatient short form is 20, standardized score is 42 03, greater than 39 4  Patients at this level are likely to benefit from DC to home  Please refer to the recommendation of the Occupational Therapist for safe DC planning  Patient will benefit from skilled Occupational Therapy services to address above deficits and facilitate a safe return to prior level of function  Goals   Patient Goals 'get stronger'   STG Time Frame   (1-7 days)   Short Term Goal  Goals established to promote Patient Goals: "no pain, more energy":  Patient will increase standing tolerance to 5 minutes during ADL task to decrease assistance level and decrease fall risk; Patient will increase bed mobility to independent in preparation for ADLS and transfers; Patient will increase functional mobility to and from bathroom with no assistive device with supervision to increase performance with ADLS and to use a toilet; Patient will tolerate 5 minutes of UE ROM/strengthening to increase general activity tolerance and performance in ADLS/IADLS; Patient will improve functional activity tolerance to 5 minutes of sustained functional tasks to increase participation in basic self-care and decrease assistance level;  Patient will be able to to verbalize understanding and perform energy conservation/proper body mechanics during ADLS and functional mobility at least 75% of the time with minimal cueing to decrease signs of fatigue and increase stamina to return to prior level of function; Patient will increase dynamic sitting balance to good to improve the ability to sit at edge of bed or on a chair for ADLS;  Patient will increase static/dynamic standing balance to fair+ to improve postural stability and decrease fall risk during standing ADLS and transfers     LTG Time Frame   (8-14 days)   Long Term Goal Patient will increase standing tolerance to 10 minutes during ADL task to decrease assistance level and decrease fall risk; Patient will increase functional mobility to and from bathroom with no assistive device independently to increase performance with ADLS and to use a toilet; Patient will tolerate 10 minutes of UE ROM/strengthening to increase general activity tolerance and performance in ADLS/IADLS; Patient will improve functional activity tolerance to 10 minutes of sustained functional tasks to increase participation in basic self-care and decrease assistance level;  Patient will be able to to verbalize understanding and perform energy conservation/proper body mechanics during ADLS and functional mobility at least 90% of the time with no cueing to decrease signs of fatigue and increase stamina to return to prior level of function; Patient will increase static/dynamic standing balance to good to improve postural stability and decrease fall risk during standing ADLS and transfers  Pt will score >/= 24/24 on AM-PAC Daily Activity Inpatient scale to promote safe independence with ADLs and functional mobility; Pt will score >/= 85/100 on Barthel Index in order to decrease caregiver assistance needed and increase ability to perform ADLs and functional mobility  Functional Transfer Goals   Pt Will Perform All Functional Transfers   (STG supervision, LTG independent)   ADL Goals   Pt Will Perform Grooming   (LTG independent)   Pt Will Perform Bathing   (STG supervision, LTG independent)   Pt Will Perform UE Dressing   (LTG independent)   Pt Will Perform LE Dressing   (STG supervision, LTG independent)   Pt Will Perform Toileting   (LTG independent)   Plan   Treatment Interventions ADL retraining;Functional transfer training;UE strengthening/ROM; Endurance training;Patient/family training;Equipment evaluation/education; Compensatory technique education;Continued evaluation; Energy conservation; Activityengagement   Goal Expiration Date 06/08/22   OT Frequency 3-5x/wk   Additional Treatment Session   Start Time 0930   End Time 0940   Treatment Assessment S: "I've been having a hard time at home" O: Patient ambulated short household distance to/from bathroom with supervision, no AD; ambulatory transfer to/from standard height toilet with supervision, no AD; toilet hygiene completed with supervision; Grooming: hand hygiene and oral care while standing to sink unsupported with supervision; stand to sit to recliner chair with supervision; patient then requesting to return to bed, transferred back to bed with supervision, sit to supine independently; A: Patient tolerated session well, SpO2 94-97% on room air before, during, after activity; patient reports feeling SOB however VSS, patient with various complaints of feeling weak and difficulties at home however patient able to perform all ADLs and functional mobility at a supervision level ; recommend patient return home , recommending outpatient OT for iADL training, endurance, and energy conservation; at end of session patient supine in bed with all needs met; P: Outpatient OT   Recommendation   OT Discharge Recommendation Home with outpatient rehabilitation   AM-PAC Daily Activity Inpatient   Lower Body Dressing 3   Bathing 3   Toileting 3   Upper Body Dressing 3   Grooming 4   Eating 4   Daily Activity Raw Score 20   Daily Activity Standardized Score (Calc for Raw Score >=11) 42 03   AM-PAC Applied Cognition Inpatient   Following a Speech/Presentation 3   Understanding Ordinary Conversation 4   Taking Medications 4   Remembering Where Things Are Placed or Put Away 4   Remembering List of 4-5 Errands 4   Taking Care of Complicated Tasks 3   Applied Cognition Raw Score 22   Applied Cognition Standardized Score 47 83   Barthel Index   Feeding 10   Bathing 0   Grooming Score 0   Dressing Score 5   Bladder Score 5   Bowels Score 10   Toilet Use Score 5   Transfers (Bed/Chair) Score 10   Mobility (Level Surface) Score 0   Stairs Score 5   Barthel Index Score 48   Licensure   [place] License Number  [id number] [id number]

## 2022-05-25 NOTE — PLAN OF CARE
Problem: HEMATOLOGIC - ADULT  Goal: Maintains hematologic stability  Description: INTERVENTIONS  - Assess for signs and symptoms of bleeding or hemorrhage  - Monitor labs  - Administer supportive blood products/factors as ordered and appropriate  Outcome: Progressing     Problem: CARDIOVASCULAR - ADULT  Goal: Maintains optimal cardiac output and hemodynamic stability  Description: INTERVENTIONS:  - Monitor I/O, vital signs and rhythm  - Monitor for S/S and trends of decreased cardiac output  - Administer and titrate ordered vasoactive medications to optimize hemodynamic stability  - Assess quality of pulses, skin color and temperature  - Assess for signs of decreased coronary artery perfusion  - Instruct patient to report change in severity of symptoms  Outcome: Progressing     Problem: CARDIOVASCULAR - ADULT  Goal: Absence of cardiac dysrhythmias or at baseline rhythm  Description: INTERVENTIONS:  - Continuous cardiac monitoring, vital signs, obtain 12 lead EKG if ordered  - Administer antiarrhythmic and heart rate control medications as ordered  - Monitor electrolytes and administer replacement therapy as ordered  Outcome: Progressing     Problem: PAIN - ADULT  Goal: Verbalizes/displays adequate comfort level or baseline comfort level  Description: Interventions:  - Encourage patient to monitor pain and request assistance  - Assess pain using appropriate pain scale  - Administer analgesics based on type and severity of pain and evaluate response  - Implement non-pharmacological measures as appropriate and evaluate response  - Consider cultural and social influences on pain and pain management  - Notify physician/advanced practitioner if interventions unsuccessful or patient reports new pain  Outcome: Progressing

## 2022-05-25 NOTE — CASE MANAGEMENT
Case Management Assessment & Discharge Planning Note    Patient name Janna Goss  Location [address] [place] [address] St-* MRN [id number]  : 1945 Date 2022       Current Admission Date: 2022  Current Admission Diagnosis:Anemia   Patient Active Problem List    Diagnosis Date Noted    Anemia 2022    Dyspnea on exertion 2022    Generalized weakness 2022    Elevated brain natriuretic peptide (BNP) level 2022    Hyponatremia 2022    Abnormal CT scan 2022    History of Savage-en-Y gastric bypass 2022    Cerumen debris on tympanic membrane of right ear 2022    Nasal congestion 2022    Bilateral hearing loss 2022    Fibromyalgia syndrome 2021    Osteopenia of both forearms 2021    Medicare annual wellness visit, subsequent 2021    Shortness of breath 2021    Acute bronchitis 2021    COVID-19 2021    Dysphasia 2020    Epigastric pain 2020    Other specified hypothyroidism 2020    Gastroesophageal reflux disease without esophagitis 2020    Mixed anxiety depressive disorder 2020    Fibromyalgia, primary 2020    Iron deficiency 2020    Numbness of foot 2020      LOS (days): 0  Geometric Mean LOS (GMLOS) (days):   Days to GMLOS:     OBJECTIVE:        Current admission status: Observation  Referral Reason: Other (Discharge planning)    Preferred Pharmacy:   [facility] [address] [address]  Phone: [phone] Fax: [phone]    Primary Care Provider: Lamin Gonsalez MD    Primary Insurance: [facility]  Secondary Insurance:     ASSESSMENT:  Georgina Briceño Proxies    There are no active Health Care Proxies on file          Obs Notice Signed: 22 (Notice given by registration per chart)    Readmission Root Cause  30 Day Readmission: No    Patient Information  Admitted from[de-identified] Home  Mental Status: Alert  During Assessment patient was accompanied by: Not accompanied during assessment  Assessment information provided by[de-identified] Patient  Primary Caregiver: Self  Support Systems: Family members (Sister and brother-in-law are supportive)  [place] of Residence: [address] [place] [address] do you live in?: [address]  Type of Current Residence: 2 story home  Upon entering residence, is there a bedroom on the main floor (no further steps)?: No  A bedroom is located on the following floor levels of residence (select all that apply):: 2nd Floor  Upon entering residence, is there a bathroom on the main floor (no further steps)?: Yes (Powder room on 1st floor)  Number of steps to 2nd floor from main floor: One Flight  In the last 12 months, was there a time when you were not able to pay the mortgage or rent on time?: No  In the last 12 months, how many places have you lived?: 1  In the last 12 months, was there a time when you did not have a steady place to sleep or slept in a shelter (including now)?: No  Homeless/housing insecurity resource given?: N/A  Living Arrangements: Lives Alone  Is patient a ?: No    Activities of Daily Living Prior to Admission  Functional Status: Independent  Completes ADLs independently?: Yes  Ambulates independently?: Yes  Does patient use assisted devices?: No  Does patient currently own DME?: No  Does patient have a history of Outpatient Therapy (PT/OT)?: No  Does the patient have a history of Short-Term Rehab?: No  Does patient have a history of HHC?: No  Does patient currently have [facility]?: No     Patient Information Continued  Income Source: Pension/nursing home  Does patient have prescription coverage?: Yes  Within the past 12 months, you worried that your food would run out before you got the money to buy more : Never true  Within the past 12 months, the food you bought just didn't last and you didn't have money to get more  Mary Doing Never true  Food insecurity resource given?: N/A  Does patient receive dialysis treatments?: No  Does patient have a history of substance abuse?: No  Does patient have a history of Mental Health Diagnosis?: Yes (Depression/anxiety)  Is patient receiving treatment for mental health?: No  Patient declined treatment information  Has patient received inpatient treatment related to mental health in the last 2 years?: No     Means of Transportation  [facility] Family transport (Patient drove up until a few weeks ago but is no longer able due to weakness and pain in her legs)  In the past 12 months, has lack of transportation kept you from medical appointments or from getting medications?: No  In the past 12 months, has lack of transportation kept you from meetings, work, or from getting things needed for daily living?: No  Was application for public transport provided?: N/A    DISCHARGE DETAILS:    Discharge planning discussed with[de-identified] Patient and brother-in-law Ramesh Parkerr of Choice: Yes  Comments - [place] of Choice: [address] reviewed with patient  Treatment team recommendations are pending  Patient stated adamantly that she would not want anyone to come to her home  CM contacted family/caregiver?: Yes      Contacts  Patient Contacts: Bruce Michel (brother-in-law)  Relationship to Patient[de-identified] Family  Contact Method: Phone  Phone Number: [phone]  Reason/Outcome: Emergency Contact, Discharge [address]         Is the patient interested in [place] [address] at discharge?: No    DME Referral Provided  Referral made for DME?: No    SW spoke with patient at bedside to introduce role of CM and conduct assessment  Patient was initially guarded but opened up when given the opportunity to talk about her [address] [place] [address] Avenue as a  for [employer]        Patient lives alone in a two-story home and reported having to go up the stairs to her bedroom on all fours recently due to weakness and pain   She fell into her bathtub just prior to this admission  She recently stopped driving and is no longer able to  line at the grocery store  Patient reported an ongoing lack of appetite and feels this is the cause of her weakness  She appeared to have little insight into the need to explore other physical reasons for her symptoms  She did state "I know I need physical therapy"  Patient appears to have minimal support  She has a sister who has health issues of her own  Her sister's  Beatrice Kirby is supportive and transports patient to appointments since she stopped driving a few weeks ago  He gets groceries and runs errands as needed  SW spoke by phone with Mina Mitchell and he noted that he and his wife live in [place] while patient lives in [place]  It is a 40-minute drive and he is not able to be there for patient as much as she might need  He did express concern about patient being alone in her home but he is aware of her resistance to any home care or services  He stated that patient is self-conscious about her home because she used to be able to keep it spotless and is no longer able  He thought patient might be a bit more open to STR if recommended  Patient is not yet medically cleared for discharge and therapy evaluations are pending  SW will continue to follow for discharge planning needs

## 2022-05-25 NOTE — ASSESSMENT & PLAN NOTE
Patient reported whole body pain and having difficulty moving whole body/ambulating for a long time but worse in past 3 months  Saw orthopedic surgeon in February this year, was told to have bad osteoarthritis on knees  Patient underwent sodium hyaluronate injection to both knees in March this year  Continue Tylenol p r n   With outpatient follow-up with PCP

## 2022-05-26 ENCOUNTER — ANESTHESIA (INPATIENT)
Dept: PERIOP | Facility: HOSPITAL | Age: 77
DRG: 381 | End: 2022-05-26
Payer: COMMERCIAL

## 2022-05-26 ENCOUNTER — ANESTHESIA EVENT (INPATIENT)
Dept: PERIOP | Facility: HOSPITAL | Age: 77
DRG: 381 | End: 2022-05-26
Payer: COMMERCIAL

## 2022-05-26 ENCOUNTER — APPOINTMENT (INPATIENT)
Dept: PERIOP | Facility: HOSPITAL | Age: 77
DRG: 381 | End: 2022-05-26
Payer: COMMERCIAL

## 2022-05-26 PROBLEM — E44.0 MODERATE PROTEIN-CALORIE MALNUTRITION (HCC): Status: ACTIVE | Noted: 2022-05-26

## 2022-05-26 LAB
ANION GAP SERPL CALCULATED.3IONS-SCNC: 6 MMOL/L (ref 4–13)
BUN SERPL-MCNC: 8 MG/DL (ref 5–25)
CALCIUM SERPL-MCNC: 8.2 MG/DL (ref 8.3–10.1)
CHLORIDE SERPL-SCNC: 107 MMOL/L (ref 100–108)
CO2 SERPL-SCNC: 23 MMOL/L (ref 21–32)
CREAT SERPL-MCNC: 0.91 MG/DL (ref 0.6–1.3)
ERYTHROCYTE [DISTWIDTH] IN BLOOD BY AUTOMATED COUNT: 16.3 % (ref 11.6–15.1)
GFR SERPL CREATININE-BSD FRML MDRD: 60 ML/MIN/1.73SQ M
GLUCOSE SERPL-MCNC: 93 MG/DL (ref 65–140)
HCT VFR BLD AUTO: 24 % (ref 34.8–46.1)
HGB BLD-MCNC: 7.7 G/DL (ref 11.5–15.4)
MCH RBC QN AUTO: 30.9 PG (ref 26.8–34.3)
MCHC RBC AUTO-ENTMCNC: 32.1 G/DL (ref 31.4–37.4)
MCV RBC AUTO: 96 FL (ref 82–98)
PLATELET # BLD AUTO: 504 THOUSANDS/UL (ref 149–390)
PMV BLD AUTO: 8.5 FL (ref 8.9–12.7)
POTASSIUM SERPL-SCNC: 3.4 MMOL/L (ref 3.5–5.3)
RBC # BLD AUTO: 2.49 MILLION/UL (ref 3.81–5.12)
SODIUM SERPL-SCNC: 136 MMOL/L (ref 136–145)
WBC # BLD AUTO: 7.94 THOUSAND/UL (ref 4.31–10.16)

## 2022-05-26 PROCEDURE — 0DB68ZX EXCISION OF STOMACH, VIA NATURAL OR ARTIFICIAL OPENING ENDOSCOPIC, DIAGNOSTIC: ICD-10-PCS | Performed by: INTERNAL MEDICINE

## 2022-05-26 PROCEDURE — 99232 SBSQ HOSP IP/OBS MODERATE 35: CPT | Performed by: FAMILY MEDICINE

## 2022-05-26 PROCEDURE — 85027 COMPLETE CBC AUTOMATED: CPT | Performed by: NURSE PRACTITIONER

## 2022-05-26 PROCEDURE — 88313 SPECIAL STAINS GROUP 2: CPT | Performed by: PATHOLOGY

## 2022-05-26 PROCEDURE — 88342 IMHCHEM/IMCYTCHM 1ST ANTB: CPT | Performed by: PATHOLOGY

## 2022-05-26 PROCEDURE — 45378 DIAGNOSTIC COLONOSCOPY: CPT | Performed by: INTERNAL MEDICINE

## 2022-05-26 PROCEDURE — 88305 TISSUE EXAM BY PATHOLOGIST: CPT | Performed by: PATHOLOGY

## 2022-05-26 PROCEDURE — 43239 EGD BIOPSY SINGLE/MULTIPLE: CPT | Performed by: INTERNAL MEDICINE

## 2022-05-26 PROCEDURE — 80048 BASIC METABOLIC PNL TOTAL CA: CPT | Performed by: FAMILY MEDICINE

## 2022-05-26 PROCEDURE — 0DJD8ZZ INSPECTION OF LOWER INTESTINAL TRACT, VIA NATURAL OR ARTIFICIAL OPENING ENDOSCOPIC: ICD-10-PCS | Performed by: INTERNAL MEDICINE

## 2022-05-26 RX ORDER — SUCRALFATE 1 G/1
1 TABLET ORAL
Status: DISCONTINUED | OUTPATIENT
Start: 2022-05-26 | End: 2022-05-28 | Stop reason: HOSPADM

## 2022-05-26 RX ORDER — SODIUM CHLORIDE, SODIUM LACTATE, POTASSIUM CHLORIDE, CALCIUM CHLORIDE 600; 310; 30; 20 MG/100ML; MG/100ML; MG/100ML; MG/100ML
INJECTION, SOLUTION INTRAVENOUS CONTINUOUS PRN
Status: DISCONTINUED | OUTPATIENT
Start: 2022-05-26 | End: 2022-05-26

## 2022-05-26 RX ORDER — SODIUM CHLORIDE, SODIUM LACTATE, POTASSIUM CHLORIDE, CALCIUM CHLORIDE 600; 310; 30; 20 MG/100ML; MG/100ML; MG/100ML; MG/100ML
75 INJECTION, SOLUTION INTRAVENOUS CONTINUOUS
Status: DISCONTINUED | OUTPATIENT
Start: 2022-05-26 | End: 2022-05-27

## 2022-05-26 RX ORDER — PROPOFOL 10 MG/ML
INJECTION, EMULSION INTRAVENOUS AS NEEDED
Status: DISCONTINUED | OUTPATIENT
Start: 2022-05-26 | End: 2022-05-26

## 2022-05-26 RX ORDER — POTASSIUM CHLORIDE 20 MEQ/1
40 TABLET, EXTENDED RELEASE ORAL ONCE
Status: COMPLETED | OUTPATIENT
Start: 2022-05-26 | End: 2022-05-26

## 2022-05-26 RX ORDER — PANTOPRAZOLE SODIUM 40 MG/1
40 TABLET, DELAYED RELEASE ORAL
Status: DISCONTINUED | OUTPATIENT
Start: 2022-05-26 | End: 2022-05-28 | Stop reason: HOSPADM

## 2022-05-26 RX ORDER — LIDOCAINE HYDROCHLORIDE 10 MG/ML
INJECTION, SOLUTION EPIDURAL; INFILTRATION; INTRACAUDAL; PERINEURAL AS NEEDED
Status: DISCONTINUED | OUTPATIENT
Start: 2022-05-26 | End: 2022-05-26

## 2022-05-26 RX ADMIN — CLONAZEPAM 3 MG: 1 TABLET ORAL at 21:53

## 2022-05-26 RX ADMIN — PROPOFOL 20 MG: 10 INJECTION, EMULSION INTRAVENOUS at 13:57

## 2022-05-26 RX ADMIN — LIDOCAINE HYDROCHLORIDE 50 MG: 10 INJECTION, SOLUTION EPIDURAL; INFILTRATION; INTRACAUDAL at 13:53

## 2022-05-26 RX ADMIN — PROPOFOL 80 MG: 10 INJECTION, EMULSION INTRAVENOUS at 13:53

## 2022-05-26 RX ADMIN — SODIUM CHLORIDE, SODIUM LACTATE, POTASSIUM CHLORIDE, AND CALCIUM CHLORIDE: .6; .31; .03; .02 INJECTION, SOLUTION INTRAVENOUS at 13:43

## 2022-05-26 RX ADMIN — PROPOFOL 20 MG: 10 INJECTION, EMULSION INTRAVENOUS at 14:02

## 2022-05-26 RX ADMIN — TRAZODONE HYDROCHLORIDE 150 MG: 50 TABLET ORAL at 21:52

## 2022-05-26 RX ADMIN — SUCRALFATE 1 G: 1 TABLET ORAL at 17:09

## 2022-05-26 RX ADMIN — IRON SUCROSE 200 MG: 20 INJECTION, SOLUTION INTRAVENOUS at 17:12

## 2022-05-26 RX ADMIN — PROPOFOL 20 MG: 10 INJECTION, EMULSION INTRAVENOUS at 13:59

## 2022-05-26 RX ADMIN — PANTOPRAZOLE SODIUM 40 MG: 40 TABLET, DELAYED RELEASE ORAL at 17:09

## 2022-05-26 RX ADMIN — POTASSIUM CHLORIDE 40 MEQ: 20 TABLET, EXTENDED RELEASE ORAL at 09:41

## 2022-05-26 RX ADMIN — SUCRALFATE 1 G: 1 TABLET ORAL at 21:53

## 2022-05-26 RX ADMIN — PROPOFOL 20 MG: 10 INJECTION, EMULSION INTRAVENOUS at 14:17

## 2022-05-26 RX ADMIN — SODIUM CHLORIDE, SODIUM LACTATE, POTASSIUM CHLORIDE, AND CALCIUM CHLORIDE 75 ML/HR: .6; .31; .03; .02 INJECTION, SOLUTION INTRAVENOUS at 17:10

## 2022-05-26 RX ADMIN — PROPOFOL 20 MG: 10 INJECTION, EMULSION INTRAVENOUS at 14:20

## 2022-05-26 RX ADMIN — LEVOTHYROXINE SODIUM 112 MCG: 112 TABLET ORAL at 06:08

## 2022-05-26 RX ADMIN — PROPOFOL 20 MG: 10 INJECTION, EMULSION INTRAVENOUS at 14:13

## 2022-05-26 RX ADMIN — PROPOFOL 20 MG: 10 INJECTION, EMULSION INTRAVENOUS at 14:09

## 2022-05-26 RX ADMIN — PROPOFOL 20 MG: 10 INJECTION, EMULSION INTRAVENOUS at 14:06

## 2022-05-26 NOTE — ANESTHESIA POSTPROCEDURE EVALUATION
Post-Op Assessment Note    CV Status:  Stable    Pain management: adequate     Mental Status:  Sleepy   Hydration Status:  Euvolemic   PONV Controlled:  Controlled   Airway Patency:  Patent      Post Op Vitals Reviewed: Yes      Staff: CRNA         No complications documented      BP   112/56   Temp   98 3   Pulse 75 (05/26/22 1429)   Resp  18   SpO2 99 % (05/26/22 1429)

## 2022-05-26 NOTE — MALNUTRITION/BMI
This medical record reflects one or more clinical indicators suggestive of malnutrition  Malnutrition Findings:   Adult Malnutrition type: Chronic illness  Adult Degree of Malnutrition: Malnutrition of moderate degree  Malnutrition Characteristics: Fat loss, Muscle loss      360 Statement: Moderate malnutrition of chronic illness related to inadequate energy intake as evidenced by somewhat hollow orbits, some depression at the temples and slightly protruding clavicles  Awaiting treatment plan    BMI Findings: Body mass index is 21 77 kg/m²  See Nutrition note dated 5/25/2022 for additional details  Completed nutrition assessment is viewable in the nutrition documentation

## 2022-05-26 NOTE — ANESTHESIA PREPROCEDURE EVALUATION
Procedure:  COLONOSCOPY  EGD    Relevant Problems   CARDIO   (+) Dyspnea on exertion      ENDO   (+) Other specified hypothyroidism      GI/HEPATIC   (+) Gastroesophageal reflux disease without esophagitis   (+) H/O bariatric surgery - bypass      HEMATOLOGY   (+) Anemia      MUSCULOSKELETAL   (+) Fibromyalgia syndrome   (+) Fibromyalgia, primary      NEURO/PSYCH   (+) Fibromyalgia syndrome   (+) Fibromyalgia, primary   (+) Mixed anxiety depressive disorder      PULMONARY   (+) Dyspnea on exertion   (+) Shortness of breath      Nervous and Auditory   (+) Bilateral hearing loss      Other   (+) Generalized weakness        Physical Exam    Airway    Mallampati score: II  TM Distance: >3 FB  Neck ROM: full     Dental       Cardiovascular  Rhythm: regular, Rate: normal,     Pulmonary  Breath sounds clear to auscultation,     Other Findings        Anesthesia Plan  ASA Score- 2     Anesthesia Type- IV sedation with anesthesia with ASA Monitors  Additional Monitors:   Airway Plan:           Plan Factors-    Chart reviewed  Patient is not a current smoker  Induction- intravenous  Postoperative Plan-     Informed Consent- Anesthetic plan and risks discussed with patient  I personally reviewed this patient with the CRNA  Discussed and agreed on the Anesthesia Plan with the CRNA  Igor Fine

## 2022-05-26 NOTE — PLAN OF CARE
Problem: Potential for Falls  Goal: Patient will remain free of falls  Description: INTERVENTIONS:  - Educate patient/family on patient safety including physical limitations  - Instruct patient to call for assistance with activity   - Consult OT/PT to assist with strengthening/mobility   - Keep Call bell within reach  - Keep bed low and locked with side rails adjusted as appropriate  - Keep care items and personal belongings within reach  - Initiate and maintain comfort rounds  - Make Fall Risk Sign visible to staff  - Offer Toileting every 2 Hours, in advance of need  - Initiate/Maintain bed/chair alarm  - Apply yellow socks and bracelet for high fall risk patients  - Consider moving patient to room near nurses station  Outcome: Progressing     Problem: Nutrition/Hydration-ADULT  Goal: Nutrient/Hydration intake appropriate for improving, restoring or maintaining nutritional needs  Description: Monitor and assess patient's nutrition/hydration status for malnutrition  Collaborate with interdisciplinary team and initiate plan and interventions as ordered  Monitor patient's weight and dietary intake as ordered or per policy  Utilize nutrition screening tool and intervene as necessary  Determine patient's food preferences and provide high-protein, high-caloric foods as appropriate       INTERVENTIONS:  - Monitor oral intake, urinary output, labs, and treatment plans  - Assess nutrition and hydration status and recommend course of action  - Evaluate amount of meals eaten  - Assist patient with eating if necessary   - Allow adequate time for meals  - Recommend/ encourage appropriate diets, oral nutritional supplements, and vitamin/mineral supplements  - Order, calculate, and assess calorie counts as needed  - Recommend, monitor, and adjust tube feedings and TPN/PPN based on assessed needs  - Assess need for intravenous fluids  - Provide specific nutrition/hydration education as appropriate  - Include patient/family/caregiver in decisions related to nutrition  Outcome: Progressing

## 2022-05-26 NOTE — PROGRESS NOTES
[address] Internal Medicine Progress Note  Patient: Ann Marie Nash 64 y o  female   MRN: [id number]  PCP: Nora Larios MD  Unit/Bed#: [address] [place] [address] Encounter: [id number]  Date Of Visit: 05/26/22    Problem List:    Principal Problem:    Anemia  Active Problems:    Dyspnea on exertion    Generalized weakness    Other specified hypothyroidism    Mixed anxiety depressive disorder    Fibromyalgia syndrome    Elevated brain natriuretic peptide (BNP) level    Hyponatremia    Abnormal CT scan    H/O bariatric surgery - bypass    Moderate protein-calorie malnutrition ([address] )      Assessment & Plan:    * Anemia  Assessment & Plan  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  There were multiple cratered, linear ulcers in the body and at the anastomosis  Had normal colonoscopy at the same time  Repeat EGD in 5/2021 showed healed ulcers  Patient denies evidence of bleeding at home  Taking iron supplement whenever she remembers it  No longer taking Protonix  Suspect presenting symptoms due to anemia  Status post 1 unit RBC transfusion  iron profile reviewed  Patient with low iron, TIBC, high ferritin, iron sat of 19  B12 1300, folate 17  IV Venofer  Plan for EGD and colonoscopy today per GI        Dyspnea on exertion  Assessment & Plan  Suspect due to anemia,+ component of anxiety  D-dimer 2 45  No PE on CTA  ProBNP 1527  No pulmonary edema on CT  No edema to lower extremity  Troponin 45-63  EKG normal sinus rhythm    Echo showed normal EF with grade 1 diastolic dysfunction  Stable on room air        Generalized weakness  Assessment & Plan  Likely multifactorial due to anemia, fibromyalgia, hyponatremia  CT head, C-spine, chest abdomen pelvis no acute traumatic findings  TSH normal   UA no evidence of infection  CT chest abdomen pelvis no evidence infection  PT OT eval treat  Fall precautions  Moderate protein-calorie malnutrition ([address] )  Assessment & Plan  Malnutrition Findings:   Adult Malnutrition type: Chronic illness  Adult Degree of Malnutrition: Malnutrition of moderate degree  Malnutrition Characteristics: Fat loss, Muscle loss                  360 Statement: Moderate malnutrition of chronic illness related to inadequate energy intake as evidenced by somewhat hollow orbits, some depression at the temples and slightly protruding clavicles  Awaiting treatment plan    BMI Findings: Body mass index is 21 4 kg/m²  H/O bariatric surgery - bypass  Assessment & Plan  Status post gastric bypass in 2010  Denies history of iron infusion  Abnormal CT scan  Assessment & Plan  Pleural-parenchymal scarring and pleural thickening both lung apices, increased from 2019  Consider follow-up chest CT in 6 months to assess for progression  Fatty changes in liver, cholelithiasis  Hyponatremia  Assessment & Plan  Sodium 131 --> 136 today  Likely due to poor intake  Continue gentle IV hydration NS 50 cc/hour do patient started back on diet  uric acid WNL  TSH normal   Repeat BMP in the morning    Elevated brain natriuretic peptide (BNP) level  Assessment & Plan  2D echo as noted above  Daily weight and I&Os  Fibromyalgia syndrome  Assessment & Plan  Patient reported whole body pain and having difficulty moving whole body/ambulating for a long time but worse in past 3 months  Saw orthopedic surgeon in February this year, was told to have bad osteoarthritis on knees  Patient underwent sodium hyaluronate injection to both knees in March this year  Not taking any medication at home  Tylenol p r n    PT OT eval treat    Mixed anxiety depressive disorder  Assessment & Plan  Continue Klonopin and trazodone at [facility]   Prior provider Verified Klonopin dose in [facility] website    Other specified hypothyroidism  Assessment & Plan  Continue Synthroid  TSH, free T4 normal           VTE Pharmacologic Prophylaxis:   None due to anemia    Patient Centered Rounds: I performed bedside rounds with nursing staff today  Discussions with Specialists or Other Care Team Provider: yes - GI    Education and Discussions with Family / Patient: Attempted to update  (Brother-in-law) via phone  Left voicemail  Time Spent for Care: 35 min  More than 50% of total time spent on counseling and coordination of care as described above  Current Length of Stay: 1 day(s)  Current Patient Status: Inpatient   Certification Statement: The patient will continue to require additional inpatient hospital stay due to Symptomatic anemia requiring EGD/colonoscopy for further evaluation  Discharge Plan: Anticipate discharge in 48-72 hrs to home  Code Status: Level 1 - Full Code    Subjective:     Patient states she wants to get the test out of the way so she can find if she is bleeding  Reports some abdominal pain    Objective:     Vitals:   Temp (24hrs), Av 9 °F (37 2 °C), Min:98 4 °F (36 9 °C), Max:99 9 °F (37 7 °C)    Temp:  [98 4 °F (36 9 °C)-99 9 °F (37 7 °C)] 99 9 °F (37 7 °C)  HR:  [79-88] 83  Resp:  [18-19] 19  BP: (113-127)/(57-70) 127/60  SpO2:  [96 %-100 %] 99 %  Body mass index is 21 57 kg/m²  Input and Output Summary (last 24 hours): Intake/Output Summary (Last 24 hours) at 2022 0850  Last data filed at 2022 2243  Gross per 24 hour   Intake 200 ml   Output --   Net 200 ml       Physical Exam:   Physical Exam  Constitutional:       General: She is not in acute distress  Appearance: She is not diaphoretic  HENT:      Head: Normocephalic and atraumatic  Eyes:      General:         Right eye: No discharge  Left eye: No discharge     Cardiovascular:      Rate and Rhythm: Normal rate and regular rhythm  Pulmonary:      Effort: Pulmonary effort is normal  No respiratory distress  Breath sounds: Normal breath sounds  No wheezing or rales  Abdominal:      General: Bowel sounds are normal  There is no distension  Palpations: Abdomen is soft  Tenderness: There is abdominal tenderness (Mild epigastric)  There is no guarding  Neurological:      Mental Status: She is alert and oriented to person, place, and time  Additional Data:     Labs:  Results from last 7 days   Lab Units 05/26/22  0558 05/25/22  0544 05/24/22  1639   WBC Thousand/uL 7 94   < > 9 37   HEMOGLOBIN g/dL 7 7*   < > 7 8*   HEMATOCRIT % 24 0*   < > 24 5*   PLATELETS Thousands/uL 504*   < > 676*   NEUTROS PCT %  --   --  59   LYMPHS PCT %  --   --  22   MONOS PCT %  --   --  10   EOS PCT %  --   --  7*    < > = values in this interval not displayed  Results from last 7 days   Lab Units 05/26/22  0558 05/25/22  0544 05/24/22  1639   SODIUM mmol/L 136   < > 131*   POTASSIUM mmol/L 3 4*   < > 4 5   CHLORIDE mmol/L 107   < > 100   CO2 mmol/L 23   < > 23   BUN mg/dL 8   < > 15   CREATININE mg/dL 0 91   < > 1 14   ANION GAP mmol/L 6   < > 8   CALCIUM mg/dL 8 2*   < > 8 9   ALBUMIN g/dL  --   --  2 7*   TOTAL BILIRUBIN mg/dL  --   --  0 37   ALK PHOS U/L  --   --  71   ALT U/L  --   --  29   AST U/L  --   --  32   GLUCOSE RANDOM mg/dL 93   < > 95    < > = values in this interval not displayed                         Lines/Drains:  Invasive Devices  Report    Peripheral Intravenous Line  Duration           Peripheral IV 05/24/22 Left Antecubital 1 day                Imaging: Reviewed radiology reports from this admission including: chest CT scan and abdominal/pelvic CT    Recent Cultures (last 7 days):         Last 24 Hours Medication List:   Current Facility-Administered Medications   Medication Dose Route Frequency Provider Last Rate    acetaminophen  650 mg Oral Q6H PRN TOLU Moore      clonazePAM  3 mg Oral HS TOLU Moore      levothyroxine  112 mcg Oral Early Morning TOLU Moore      ondansetron  4 mg Intravenous Q6H PRN TOLU Moore      potassium chloride  40 mEq Oral Once Ginette Adhikari, DO      sodium chloride  50 mL/hr Intravenous Continuous TOLU Moore 50 mL/hr (05/25/22 2316)    traZODone  150 mg Oral HS TOLU Moore          Today, Patient Was Seen By: Layne Perez DO    ** Please Note: "This note has been constructed using a voice recognition system  Therefore there may be syntax, spelling, and/or grammatical errors   Please call if you have any questions  "**

## 2022-05-26 NOTE — ASSESSMENT & PLAN NOTE
Malnutrition Findings:   Adult Malnutrition type: Chronic illness  Adult Degree of Malnutrition: Malnutrition of moderate degree  Malnutrition Characteristics: Fat loss, Muscle loss                  360 Statement: Moderate malnutrition of chronic illness related to inadequate energy intake as evidenced by somewhat hollow orbits, some depression at the temples and slightly protruding clavicles  Awaiting treatment plan    BMI Findings: Body mass index is 21 77 kg/m²

## 2022-05-26 NOTE — PROGRESS NOTES
[address] Internal [facility] Progress Note  Patient: Alanna Gomez 64 y o  female   MRN: [id number]  PCP: Martin Matute MD  Unit/Bed#: [address] [place] [address] Encounter: [id number]  Date Of Visit: 05/25/22    Problem List:    Principal Problem:    Anemia  Active Problems:    Dyspnea on exertion    Generalized weakness    Other specified hypothyroidism    Mixed anxiety depressive disorder    Fibromyalgia syndrome    Elevated brain natriuretic peptide (BNP) level    Hyponatremia    Abnormal CT scan    History of Savage-en-Y gastric bypass      Assessment & Plan:    * Anemia  Assessment & Plan  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  There were multiple cratered, linear ulcers in the body and at the anastomosis  Had normal colonoscopy at the same time  Repeat EGD in 5/2021 showed healed ulcers  Patient denies evidence of bleeding at home  Taking iron supplement whenever she remembers it  No longer taking Protonix  Suspect presenting symptoms due to anemia  Status post 1 unit RBC transfusion  iron profile reviewed  Patient with low iron, TIBC, high ferritin, iron sat of 19  B12 1300, folate 17  IV Venofer  Plan for EGD and colonoscopy tomorrow per GI        Dyspnea on exertion  Assessment & Plan  Suspect due to anemia,+ component of anxiety  D-dimer 2 45  No PE on CTA  ProBNP 1527  No pulmonary edema on CT  No edema to lower extremity  Troponin 45-63     EKG normal sinus rhythm  Echo showed normal EF with grade 1 diastolic dysfunction  Stable on room air        Generalized weakness  Assessment & Plan  Likely multifactorial due to anemia, fibromyalgia, hyponatremia  CT head, C-spine, chest abdomen pelvis no acute traumatic findings  TSH normal   UA no evidence of infection  CT chest abdomen pelvis no evidence infection  PT OT eval treat  Fall precautions  History of Savage-en-Y gastric bypass  Assessment & Plan  Status post gastric bypass in 2010  Denies history of iron infusion    Abnormal CT scan  Assessment & Plan  Pleural-parenchymal scarring and pleural thickening both lung apices, increased from 2019  Consider follow-up chest CT in 6 months to assess for progression  Fatty changes in liver, cholelithiasis    Hyponatremia  Assessment & Plan  Sodium 131 --> 139 today  Likely due to poor intake  Continue gentle IV hydration NS 50 cc/hour  uric acid WNL  TSH normal  Repeat BMP in the morning    Elevated brain natriuretic peptide (BNP) level  Assessment & Plan  2D echo as noted above  Daily weight and I&Os    Fibromyalgia syndrome  Assessment & Plan  Patient reported whole body pain and having difficulty moving whole body/ambulating for a long time but worse in past 3 months  Saw orthopedic surgeon in February this year, was told to have bad osteoarthritis on knees  Patient underwent sodium hyaluronate injection to both knees in March this year  Not taking any medication at home  Tylenol p r n  PT OT eval treat    Mixed anxiety depressive disorder  Assessment & Plan  Continue Klonopin and tra[facility]odone at [facility]  Prior provider Verified Klonopin dose in [address] website    Other specified hypothyroidism  Assessment & Plan  Continue Synthroid  TSH, free T4 normal            VTE Pharmacologic Prophylaxis:   None due to anemia    Patient Centered Rounds: I performed bedside rounds with nursing staff today  Discussions with Specialists or Other Care Team Provider: yes - GI    Education and Discussions with Family / Patient:  Patient stated she will update family    Time Spent for Care: 35 minutes  More than 50% of total time spent on counseling and coordination of care as described above      Current Length of Stay: 0 day(s)  Current Patient Status: Inpatient   Certification Statement: The patient will continue to require additional inpatient hospital stay due to Anemia requiring EGD/colonoscopy for further evaluation  Discharge Plan: Anticipate discharge in 48-72 hrs to discharge location to be determined pending rehab evaluations  Code Status: Level 1 - Full Code    Subjective:     Patient reports no significant improvement since yesterday  Denies any chest pain  Objective:     Vitals:   Temp (24hrs), Av °F (36 7 °C), Min:97 5 °F (36 4 °C), Max:98 5 °F (36 9 °C)    Temp:  [97 5 °F (36 4 °C)-98 5 °F (36 9 °C)] 98 4 °F (36 9 °C)  HR:  [65-88] 88  Resp:  [18] 18  BP: ()/(41-70) 126/70  SpO2:  [91 %-100 %] 100 %  Body mass index is 21 77 kg/m²  Input and Output Summary (last 24 hours): Intake/Output Summary (Last 24 hours) at 2022  Last data filed at 2022 0225  Gross per 24 hour   Intake 350 ml   Output --   Net 350 ml       Physical Exam:   Physical Exam  Constitutional:       General: She is not in acute distress  Appearance: She is not diaphoretic  HENT:      Head: Normocephalic and atraumatic  Eyes:      General:         Right eye: No discharge  Left eye: No discharge  Cardiovascular:      Rate and Rhythm: Normal rate and regular rhythm  Pulmonary:      Effort: Pulmonary effort is normal  No respiratory distress  Breath sounds: Normal breath sounds  No wheezing or rales  Abdominal:      General: Bowel sounds are normal  There is no distension  Palpations: Abdomen is soft  Tenderness: There is abdominal tenderness (Mild epigastric)  There is no guarding  Neurological:      Mental Status: She is alert           Additional Data:     Labs:  Results from last 7 days   Lab Units 22  0544 22  1639   WBC Thousand/uL 7 38 9 37   HEMOGLOBIN g/dL 8 1* 7 8*   HEMATOCRIT % 24 8* 24 5*   PLATELETS Thousands/uL 519* 676*   NEUTROS PCT %  --  59   LYMPHS PCT %  --  22   MONOS PCT %  --  10   EOS PCT %  --  7*     Results from last 7 days   Lab Units 05/25/22  0544 05/24/22  1639   SODIUM mmol/L 139 131*   POTASSIUM mmol/L 3 6 4 5   CHLORIDE mmol/L 106 100   CO2 mmol/L 24 23   BUN mg/dL 14 15   CREATININE mg/dL 0 98 1 14   ANION GAP mmol/L 9 8   CALCIUM mg/dL 8 2* 8 9   ALBUMIN g/dL  --  2 7*   TOTAL BILIRUBIN mg/dL  --  0 37   ALK PHOS U/L  --  71   ALT U/L  --  29   AST U/L  --  32   GLUCOSE RANDOM mg/dL 93 95                       Lines/Drains:  Invasive Devices  Report    Peripheral Intravenous Line  Duration           Peripheral IV 05/24/22 Left Antecubital 1 day                Imaging: Reviewed radiology reports from this admission including: ECHO    Recent Cultures (last 7 days):         Last 24 Hours Medication List:   Current Facility-Administered Medications   Medication Dose Route Frequency Provider Last Rate    acetaminophen  650 mg Oral Q6H PRN Cuiyin Yurik, CRNP      bisacodyl  10 mg Oral Once [place], PINGNP      clonazePAM  3 mg Oral HS Cuiyin Yurik, CRNP      iron sucrose  300 mg Intravenous Once Ginette Adhikari DO      levothyroxine  112 mcg Oral Early Morning Cuiyin Yurik, CRNP      ondansetron  4 mg Intravenous Q6H PRN Cuiyin Yurik, CRNP      sodium chloride  50 mL/hr Intravenous Continuous Cuiyin Yurik, CRNP 50 mL/hr (05/25/22 0226)    traZODone  150 mg Oral HS Cuiyin Yurik, CRNP          Today, Patient Was Seen By: Hina Ruff DO    ** Please Note: "This note has been constructed using a voice recognition system  Therefore there may be syntax, spelling, and/or grammatical errors   Please call if you have any questions  "**

## 2022-05-26 NOTE — INTERVAL H&P NOTE
H&P reviewed  After examining the patient I find no changes in the patients condition since the H&P had been written      Vitals:    05/26/22 1304   BP: 126/52   Pulse: 75   Resp: (!) 24   Temp: 97 5 °F (36 4 °C)   SpO2: 100%

## 2022-05-27 PROBLEM — R50.9 FEVER: Status: ACTIVE | Noted: 2022-05-27

## 2022-05-27 PROBLEM — R79.89 ELEVATED BRAIN NATRIURETIC PEPTIDE (BNP) LEVEL: Status: RESOLVED | Noted: 2022-05-24 | Resolved: 2022-05-27

## 2022-05-27 LAB
ANION GAP SERPL CALCULATED.3IONS-SCNC: 7 MMOL/L (ref 4–13)
ATRIAL RATE: 79 BPM
BUN SERPL-MCNC: 6 MG/DL (ref 5–25)
CALCIUM SERPL-MCNC: 8.2 MG/DL (ref 8.3–10.1)
CHLORIDE SERPL-SCNC: 107 MMOL/L (ref 100–108)
CO2 SERPL-SCNC: 23 MMOL/L (ref 21–32)
CREAT SERPL-MCNC: 0.86 MG/DL (ref 0.6–1.3)
ERYTHROCYTE [DISTWIDTH] IN BLOOD BY AUTOMATED COUNT: 16 % (ref 11.6–15.1)
FLUAV RNA RESP QL NAA+PROBE: NEGATIVE
FLUBV RNA RESP QL NAA+PROBE: NEGATIVE
GFR SERPL CREATININE-BSD FRML MDRD: 64 ML/MIN/1.73SQ M
GLUCOSE SERPL-MCNC: 87 MG/DL (ref 65–140)
HCT VFR BLD AUTO: 24.8 % (ref 34.8–46.1)
HGB BLD-MCNC: 7.9 G/DL (ref 11.5–15.4)
MCH RBC QN AUTO: 31 PG (ref 26.8–34.3)
MCHC RBC AUTO-ENTMCNC: 31.9 G/DL (ref 31.4–37.4)
MCV RBC AUTO: 97 FL (ref 82–98)
P AXIS: 69 DEGREES
PLATELET # BLD AUTO: 510 THOUSANDS/UL (ref 149–390)
PMV BLD AUTO: 9.3 FL (ref 8.9–12.7)
POTASSIUM SERPL-SCNC: 3.8 MMOL/L (ref 3.5–5.3)
PR INTERVAL: 156 MS
PROCALCITONIN SERPL-MCNC: 0.11 NG/ML
QRS AXIS: 8 DEGREES
QRSD INTERVAL: 80 MS
QT INTERVAL: 382 MS
QTC INTERVAL: 438 MS
RBC # BLD AUTO: 2.55 MILLION/UL (ref 3.81–5.12)
RSV RNA RESP QL NAA+PROBE: NEGATIVE
SARS-COV-2 RNA RESP QL NAA+PROBE: NEGATIVE
SODIUM SERPL-SCNC: 137 MMOL/L (ref 136–145)
T WAVE AXIS: 26 DEGREES
VENTRICULAR RATE: 79 BPM
WBC # BLD AUTO: 8.35 THOUSAND/UL (ref 4.31–10.16)

## 2022-05-27 PROCEDURE — 0241U HB NFCT DS VIR RESP RNA 4 TRGT: CPT | Performed by: FAMILY MEDICINE

## 2022-05-27 PROCEDURE — 99232 SBSQ HOSP IP/OBS MODERATE 35: CPT | Performed by: FAMILY MEDICINE

## 2022-05-27 PROCEDURE — 85027 COMPLETE CBC AUTOMATED: CPT | Performed by: FAMILY MEDICINE

## 2022-05-27 PROCEDURE — 84145 PROCALCITONIN (PCT): CPT | Performed by: FAMILY MEDICINE

## 2022-05-27 PROCEDURE — 80048 BASIC METABOLIC PNL TOTAL CA: CPT | Performed by: FAMILY MEDICINE

## 2022-05-27 PROCEDURE — 99232 SBSQ HOSP IP/OBS MODERATE 35: CPT | Performed by: NURSE PRACTITIONER

## 2022-05-27 PROCEDURE — 87040 BLOOD CULTURE FOR BACTERIA: CPT | Performed by: FAMILY MEDICINE

## 2022-05-27 PROCEDURE — 97530 THERAPEUTIC ACTIVITIES: CPT

## 2022-05-27 PROCEDURE — 93010 ELECTROCARDIOGRAM REPORT: CPT | Performed by: INTERNAL MEDICINE

## 2022-05-27 RX ADMIN — SUCRALFATE 1 G: 1 TABLET ORAL at 08:26

## 2022-05-27 RX ADMIN — PANTOPRAZOLE SODIUM 40 MG: 40 TABLET, DELAYED RELEASE ORAL at 18:04

## 2022-05-27 RX ADMIN — SUCRALFATE 1 G: 1 TABLET ORAL at 13:01

## 2022-05-27 RX ADMIN — TRAZODONE HYDROCHLORIDE 150 MG: 50 TABLET ORAL at 22:31

## 2022-05-27 RX ADMIN — SUCRALFATE 1 G: 1 TABLET ORAL at 18:04

## 2022-05-27 RX ADMIN — ACETAMINOPHEN 650 MG: 325 TABLET ORAL at 08:23

## 2022-05-27 RX ADMIN — CLONAZEPAM 3 MG: 1 TABLET ORAL at 22:31

## 2022-05-27 RX ADMIN — ONDANSETRON 4 MG: 2 INJECTION INTRAMUSCULAR; INTRAVENOUS at 13:01

## 2022-05-27 RX ADMIN — IRON SUCROSE 200 MG: 20 INJECTION, SOLUTION INTRAVENOUS at 08:34

## 2022-05-27 RX ADMIN — SUCRALFATE 1 G: 1 TABLET ORAL at 22:31

## 2022-05-27 RX ADMIN — SODIUM CHLORIDE 50 ML/HR: 0.9 INJECTION, SOLUTION INTRAVENOUS at 08:35

## 2022-05-27 RX ADMIN — PANTOPRAZOLE SODIUM 40 MG: 40 TABLET, DELAYED RELEASE ORAL at 08:25

## 2022-05-27 RX ADMIN — LEVOTHYROXINE SODIUM 112 MCG: 112 TABLET ORAL at 05:30

## 2022-05-27 NOTE — UTILIZATION REVIEW
Continued Stay Review    Date:     5/27/22                          Current Patient Class:    Inpatient  Current Level of Care:   Med surg    HPI:76 y o  female initially admitted on Observation 5/24/22 @ 1932, converted to inpatient admission 5/25/22 @ [id number] for continued care & tx for anemia  Assessment/Plan:   5/27    Continue  To monitor labs  Need hemoglobin > 7,  7 9   Continue  IV venofer daily  This am   Febrile this am, 101 2  @  0321 and  99 9 @  0800  Tolerating diet  No signs of  GIB  Continue  PT/OT/fall precautions  O2  sats  Stable on room air  Vital Signs:   99 9 °F (37 7 °C) 84 -- 116/52 73 95 % -- -- --    05/27/22 08:16:58 101 1 °F (38 4 °C) Abnormal  83 -- 116/52 73 95 % -- -- --   05/27/22 03:21:47 101 2 °F (38 4 °C) Abnormal  83 16 110/51 71 94 %            Pertinent Labs/Diagnostic Results:       Lab Units 05/27/22  0529   WBC Thousand/uL 8 35   HEMOGLOBIN g/dL 7 9*   HEMATOCRIT % 24 8*   PLATELETS Thousands/uL 510*   NEUTROS ABS Thousands/µL  --          Lab Units 05/27/22  0529   SODIUM mmol/L 137   POTASSIUM mmol/L 3 8   CHLORIDE mmol/L 107   CO2 mmol/L 23   ANION GAP mmol/L 7   BUN mg/dL 6   CREATININE mg/dL 0 86   EGFR ml/min/1 73sq m 64   CALCIUM mg/dL 8 2*   MAGNESIUM mg/dL  --            Medications:   Scheduled Medications:  clonazePAM, 3 mg, Oral, HS  levothyroxine, 112 mcg, Oral, Early Morning  pantoprazole, 40 mg, Oral, BID AC  sucralfate, 1 g, Oral, 4x Daily (AC & HS)  traZODone, 150 mg, Oral, HS  IV venofer daily      Continuous IV Infusions:  sodium chloride, 50 mL/hr, Intravenous, Continuous      PRN Meds:  acetaminophen, 650 mg, Oral, Q6H PRN  ondansetron, 4 mg, Intravenous, Q6H PRN        Discharge Plan:    TBD    Network Utilization Review Department  ATTENTION: Please call with any questions or concerns to [phone] and carefully listen to the prompts so that you are directed to the right person   All voicemails are confidential   Mandy Harper all requests for admission clinical reviews, approved or denied determinations and any other requests to dedicated fax number below belonging to the campus where the patient is receiving treatment   List of dedicated fax numbers for the Facilities:  [address] [place] [address] DENIALS (Administrative/Medical Necessity) [phone]   [address] (Maternity/NICU/Pediatrics) [phone]   [address]  [address] [address] [id number] [address] Alesia Maciel [address] [facility] [facility] [phone]

## 2022-05-27 NOTE — PROGRESS NOTES
[facility] Progress Note  Patient: Дмитрий Aquino 64 y o  female   MRN: [id number]  PCP: Chaparrita Acosta MD  Unit/Bed#: [address] [place] [address] Encounter: [id number]  Date Of Visit: 05/27/22    Problem List:    Principal Problem:    Anemia  Active Problems:    Fever    Dyspnea on exertion    Generalized weakness    Other specified hypothyroidism    Mixed anxiety depressive disorder    Fibromyalgia syndrome    Hyponatremia    Abnormal CT scan    H/O bariatric surgery - bypass    Moderate protein-calorie malnutrition ([address] )      Assessment & Plan:    * Anemia  Assessment & Plan  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  There were multiple cratered, linear ulcers in the body and at the anastomosis  Had normal colonoscopy at the same time  Repeat EGD in 5/2021 showed healed ulcers  Patient denies evidence of bleeding at home  Taking iron supplement whenever she remembers it  No longer taking Protonix  Suspect presenting symptoms due to anemia  Status post 1 unit RBC transfusion  Status post colonoscopy with no lesion or bleeding  Also status post EGD yesterday which showed large ulcer near anastomotic site and distal gastric remnant  No active bleeding was noted  - will need repeat EGD with GI after discharge  Continue PPI b i d  And Carafate 4 times a day  iron profile reviewed  Patient with low iron, TIBC, high ferritin, iron sat of 19  B12 1300, folate 17  IV Venofer X 3 doses given        Fever  Assessment & Plan  Patient with T-max of 101 2°, no obvious source of infection  - CTA was negative for pneumonia  - tested for COVID today which is also negative  - blood cultures pending    Check procalcitonin    Generalized weakness  Assessment & Plan  Likely multifactorial due to anemia, fibromyalgia, hyponatremia  CT head, C-spine, chest abdomen pelvis no acute traumatic findings  TSH normal   UA negative for UTI  CT chest abdomen pelvis no evidence infection  PT OT eval treat    Dyspnea on exertion  Assessment & Plan  Suspect due to anemia,+ component of anxiety  D-dimer 2 45  No PE on CTA  Continues to remain stable on room air  ProBNP 1527  No pulmonary edema on CT  No edema to lower extremity  Troponin 45-63  EKG normal sinus rhythm  Echo showed normal EF with grade 1 diastolic dysfunction        Moderate protein-calorie malnutrition (HCC)  Assessment & Plan  Malnutrition Findings:   Adult Malnutrition type: Chronic illness  Adult Degree of Malnutrition: Malnutrition of moderate degree  Malnutrition Characteristics: Fat loss, Muscle loss                  360 Statement: Moderate malnutrition of chronic illness related to inadequate energy intake as evidenced by somewhat hollow orbits, some depression at the temples and slightly protruding clavicles  Awaiting treatment plan    BMI Findings: Body mass index is 21 77 kg/m²  H/O bariatric surgery - bypass  Assessment & Plan  Status post gastric bypass in 2010  Denies history of iron infusion    Abnormal CT scan  Assessment & Plan  Pleural-parenchymal scarring and pleural thickening both lung apices, increased from 2019  Follow-up chest CT in 6 months to assess for progression  Fatty changes in liver, cholelithiasis  Hyponatremia  Assessment & Plan  Sodium 131 --> 137 today  Likely due to poor intake  Discontinue IV hydration NS 50 cc/hour as patient started back on diet  uric acid WNL  TSH normal   Repeat BMP in the morning    Fibromyalgia syndrome  Assessment & Plan  Patient reported whole body pain and having difficulty moving whole body/ambulating for a long time but worse in past 3 months    Saw orthopedic surgeon in February this year, was told to have bad osteoarthritis on knees  Patient underwent sodium hyaluronate injection to both knees in March this   Tylenol p r n  Mixed anxiety depressive disorder  Assessment & Plan  Continue Klonopin, trazodone at HS  Prior provider Verified Klonopin dose in [facility] website    Other specified hypothyroidism  Assessment & Plan  On Synthroid  TSH, free T4 normal     Elevated brain natriuretic peptide (BNP) level-resolved as of 2022  Assessment & Plan  2D echo as noted above  Daily weight and I&Os  VTE Pharmacologic Prophylaxis:   None due to anemia    Patient Centered Rounds: I performed bedside rounds with nursing staff today  Discussions with Specialists or Other Care Team Provider: yes - GI    Education and Discussions with Family / Patient: Attempted to update  (Brother-in-law) via phone  Left voicemail  Time Spent for Care: 30 minutes  More than 50% of total time spent on counseling and coordination of care as described above  Current Length of Stay: 2 day(s)  Current Patient Status: Inpatient   Certification Statement: The patient will continue to require additional inpatient hospital stay due to Unexplained fever requiring evaluation  Discharge Plan: Anticipate discharge in 24-48 hrs to home  Code Status: Level 1 - Full Code    Subjective:     Patient states she is doing better  Tolerating diet  Wants to get home as soon as possible    Objective:     Vitals:   Temp (24hrs), Av 7 °F (37 6 °C), Min:97 4 °F (36 3 °C), Max:101 2 °F (38 4 °C)    Temp:  [97 4 °F (36 3 °C)-101 2 °F (38 4 °C)] 97 4 °F (36 3 °C)  HR:  [67-84] 67  Resp:  [15-19] 15  BP: (105-132)/(51-58) 105/58  SpO2:  [94 %-100 %] 99 %  Body mass index is 21 77 kg/m²  Input and Output Summary (last 24 hours):      Intake/Output Summary (Last 24 hours) at 2022 1932  Last data filed at 2022 1301  Gross per 24 hour   Intake 450 ml   Output 550 ml   Net -100 ml Physical Exam:   Physical Exam  Constitutional:       General: She is not in acute distress  Appearance: She is not diaphoretic  HENT:      Head: Normocephalic and atraumatic  Eyes:      General: No scleral icterus  Cardiovascular:      Rate and Rhythm: Normal rate and regular rhythm  Pulmonary:      Effort: Pulmonary effort is normal  No respiratory distress  Breath sounds: Normal breath sounds  No wheezing or rales  Abdominal:      General: Bowel sounds are normal  There is no distension  Palpations: Abdomen is soft  Tenderness: There is no abdominal tenderness  Neurological:      Mental Status: She is alert and oriented to person, place, and time  Additional Data:     Labs:  Results from last 7 days   Lab Units 05/27/22  0529 05/25/22  0544 05/24/22  1639   WBC Thousand/uL 8 35   < > 9 37   HEMOGLOBIN g/dL 7 9*   < > 7 8*   HEMATOCRIT % 24 8*   < > 24 5*   PLATELETS Thousands/uL 510*   < > 676*   NEUTROS PCT %  --   --  59   LYMPHS PCT %  --   --  22   MONOS PCT %  --   --  10   EOS PCT %  --   --  7*    < > = values in this interval not displayed  Results from last 7 days   Lab Units 05/27/22  0529 05/25/22  0544 05/24/22  1639   SODIUM mmol/L 137   < > 131*   POTASSIUM mmol/L 3 8   < > 4 5   CHLORIDE mmol/L 107   < > 100   CO2 mmol/L 23   < > 23   BUN mg/dL 6   < > 15   CREATININE mg/dL 0 86   < > 1 14   ANION GAP mmol/L 7   < > 8   CALCIUM mg/dL 8 2*   < > 8 9   ALBUMIN g/dL  --   --  2 7*   TOTAL BILIRUBIN mg/dL  --   --  0 37   ALK PHOS U/L  --   --  71   ALT U/L  --   --  29   AST U/L  --   --  32   GLUCOSE RANDOM mg/dL 87   < > 95    < > = values in this interval not displayed  Lines/Drains:  Invasive Devices  Report    Peripheral Intravenous Line  Duration           Peripheral IV 05/27/22 Distal;Dorsal (posterior); Right Forearm <1 day              Imaging: Reviewed radiology reports from this admission including: procedure reports    Recent Cultures (last 7 days):   Results from last 7 days   Lab Units 05/27/22  1019   BLOOD CULTURE  Received in Microbiology Lab  Culture in Progress  Received in Microbiology Lab  Culture in Progress  Last 24 Hours Medication List:   Current Facility-Administered Medications   Medication Dose Route Frequency Provider Last Rate    acetaminophen  650 mg Oral Q6H PRN TOLU Moore      clonazePAM  3 mg Oral HS ZahraiTOLU Young      levothyroxine  112 mcg Oral Early Morning TOLU Moore      ondansetron  4 mg Intravenous Q6H PRN TOLU Moore      pantoprazole  40 mg Oral BID AC Karthikeyan Escalera MD      sucralfate  1 g Oral 4x Daily (AC & HS) Karthikeyan Escalera MD      traZODone  150 mg Oral HS TOLU Saucedo          Today, Patient Was Seen By: Eugena Mcburney, DO    ** Please Note: "This note has been constructed using a voice recognition system  Therefore there may be syntax, spelling, and/or grammatical errors   Please call if you have any questions  "**

## 2022-05-27 NOTE — PHYSICAL THERAPY NOTE
PT TREATMENT     05/27/22 0925   Note Type   Note Type Treatment   Pain Assessment   Pain Assessment Tool 0-10   Pain Score No Pain   Restrictions/Precautions   Other Precautions Fall Risk;Bed Alarm; Chair Alarm; Impulsive   General   Chart Reviewed Yes   Cognition   Arousal/Participation Cooperative   Subjective   Subjective "I just want to go home"   Bed Mobility   Supine to Sit 7  Independent   Sit to Supine 7  Independent   Transfers   Sit to Stand 5  Supervision   Stand to Sit 5  Supervision   Stand pivot 5  Supervision   Ambulation/Elevation   Gait Assistance 5  Supervision with occasional min assist   Additional items Verbal cues  (for safety)   Distance x 20 feet, x 40 feet, x 20 feet   Balance   Static Sitting Fair +   Dynamic Sitting Fair +   Static Standing Fair +   Dynamic Standing Fair   Ambulatory Fair   Activity Tolerance   Activity Tolerance Patient limited by fatigue   Assessment   Prognosis Good   Problem List Decreased strength; Impaired balance;Decreased mobility;Pain   Assessment Pt demonstrates improving activity tolerance today overall  Pt is impulsive when walking/transferring to sitting but can be corrected  Pt was able to walk a total of 80 feet today in the hallway with supervision/occasional min assist   Offerred pt a walker but she declined  Recommend OP PT for pt's fibromyalgia and knee pain  Pt is difficult to keep on task and has many complaints about everything  The patient's AM-PAC Basic Mobility Inpatient Short Form Raw Score is 19  A Raw score of greater than 16 suggests the patient may benefit from discharge to home  Plan   Treatment/Interventions ADL retraining;Functional transfer training;Elevations;Gait training;Equipment eval/education; Endurance training; Therapeutic exercise;LE strengthening/ROM; Patient/family training   Progress Progressing toward goals   PT Frequency Other (Comment)  (5w)   Recommendation   PT Discharge Recommendation Home with outpatient rehabilitation   Equipment Recommended   (trialed pt with walker however it did not improve her gait or endurance)   AM-PAC Basic Mobility Inpatient   Turning in Bed Without Bedrails 4   Lying on Back to Sitting on Edge of Flat Bed 4   Moving Bed to Chair 3   Standing Up From Chair 3   Walk in Room 3   Climb 3-5 Stairs 2   Basic Mobility Inpatient Raw Score 19   Basic Mobility Standardized Score 42 48   Highest Level Of Mobility   JH-HLM Goal 6: Walk 10 steps or more   JH-HLM Achieved 7: Walk 25 feet or more   Licensure   [place] License Number  [place] AMADOR [id number]

## 2022-05-27 NOTE — ASSESSMENT & PLAN NOTE
Patient with T-max of 101 2°, no obvious source of infection  - CTA was negative for pneumonia  COVID test was negative  Blood cultures x2 negative at 24 hours  Procalcitonin level was negative x2  Possible secondary to atelectasis  Repeat chest x-ray was also performed no showed no acute cardiopulmonary disease  Encourage incentive spirometry  No indication for antibiotics

## 2022-05-27 NOTE — PROGRESS NOTES
Progress Note - [facility]  Mansoor Guerrero 64 y o  female MRN: [id number]    Unit/Bed#: [address] [place] [address] Encounter: [id number]      Assessment/Plan:  68-year-old female with past medical history of anxiety, gastric bypass, bleeding gastric ulcers, hypothyroidism, and osteoarthritis who presents to [facility] on 05/24 with report of generalized weakness, lightheadedness, dizziness and shortness of breath with exertion      1  Symptomatic anemia  Patient presented with symptomatic anemia  Hemoglobin and emergent room 7 8 hemoglobin this a m  8 1  Patient's baseline hemoglobin 12 7  Patient does report a history of iron deficient anemia but has not taking her iron for some time  Anemia most likely multifactorial but need to rule out GI blood losses contributing factor  Patient denies any signs of GI bleed  Patient denies bright red blood in stool, bright red blood from rectal area, or black tarry stool  No nausea or vomiting  Denies abdominal pain or tenderness with palpation     -Colonoscopy done 5/25 showed small mild diverticula in sigmoid colon  No  lesions to explain anemia  -EGD done 5/25 showed Large ulcer noted near the anastomotic site and distal gastric remnant  Staples noted in this area  No active bleeding or stigmata of bleeding noted from the ulceration  Biopsies taken  Status post Savage-en-Y gastric bypass surgery   -Await biopsy results  -Continue pantoprazole b i d  and Carafate 1 g 4 times a day before meals and bedtime-patient will need to continue medication in till repeat EGD done  -Monitor hemoglobin  -Transfuse blood products as needed to keep hemoglobin greater than 7  -Monitor signs of GI bleed  -Continue supportive care  -Non ulcerogenic diet  -Iron supplements per attending  -Will follow as needed, call GI if needed  -Patient will need repeat EGD in 2 months   -Follow-up with GI as outpatient      Subjective:   Patient had temperature 101 2° at 0321 this a  m   Temperature 99 9° at 8:00 a m  Patient denies nausea, vomiting, acid reflux, heartburn, epigastric or abdominal pain  Tolerating diet  No signs of GI bleed  Denies blood in stool, blood from rectal area, or black tarry stool  Objective:     Vitals: Blood pressure 116/52, pulse 84, temperature 99 9 °F (37 7 °C), resp  rate 16, height 5' 2" (1 575 m), weight 54 kg (119 lb 0 8 oz), SpO2 95 %  ,Body mass index is 21 77 kg/m²  Intake/Output Summary (Last 24 hours) at 5/27/2022 1118  Last data filed at 5/26/2022 1801  Gross per 24 hour   Intake 1650 ml   Output --   Net 1650 ml       Physical Exam: Physical Exam  Vitals and nursing note reviewed  Constitutional:       General: She is not in acute distress  Cardiovascular:      Rate and Rhythm: Normal rate and regular rhythm  Pulses: Normal pulses  Heart sounds: Normal heart sounds  Pulmonary:      Effort: Pulmonary effort is normal  No respiratory distress  Breath sounds: Normal breath sounds  No stridor  No wheezing, rhonchi or rales  Abdominal:      General: Bowel sounds are normal  There is no distension  Palpations: Abdomen is soft  There is no mass  Tenderness: There is no abdominal tenderness  There is no guarding or rebound  Hernia: No hernia is present  Musculoskeletal:      Right lower leg: No edema  Left lower leg: No edema  Skin:     General: Skin is warm and dry  Capillary Refill: Capillary refill takes less than 2 seconds  Coloration: Skin is pale  Skin is not jaundiced  Neurological:      Mental Status: She is alert and oriented to person, place, and time     Psychiatric:         Mood and Affect: Mood normal          Invasive Devices  Report    Peripheral Intravenous Line  Duration           Peripheral IV 05/24/22 Left Antecubital 2 days                Current Facility-Administered Medications:     acetaminophen (TYLENOL) tablet 650 mg, 650 mg, Oral, Q6H PRN, TOLU Moore, 650 mg at 05/27/22 0823    clonazePAM (KlonoPIN) tablet 3 mg, 3 mg, Oral, HS, Otis Martin, PINGNP, 3 mg at 05/26/22 2153    levothyroxine tablet 112 mcg, 112 mcg, Oral, Early Morning, Otis Martin, CRNP, 112 mcg at 05/27/22 0530    ondansetron (ZOFRAN) injection 4 mg, 4 mg, Intravenous, Q6H PRN, Otis Martin, PINGNP    pantoprazole (PROTONIX) EC tablet 40 mg, 40 mg, Oral, BID AC, Surinder Bunch MD, 40 mg at 05/27/22 0825    sodium chloride 0 9 % infusion, 50 mL/hr, Intravenous, Continuous, TOLU Moore, Last Rate: 50 mL/hr at 05/27/22 0835, 50 mL/hr at 05/27/22 0835    sucralfate (CARAFATE) tablet 1 g, 1 g, Oral, 4x Daily (AC & HS), Surinder Bunch MD, 1 g at 05/27/22 0826    traZODone (DESYREL) tablet 150 mg, 150 mg, Oral, HS, PING MooreNP, 150 mg at 05/26/22 2152    Lab Results:   Recent Results (from the past 24 hour(s))   Basic metabolic panel    Collection Time: 05/27/22  5:29 AM   Result Value Ref Range    Sodium 137 136 - 145 mmol/L    Potassium 3 8 3 5 - 5 3 mmol/L    Chloride 107 100 - 108 mmol/L    CO2 23 21 - 32 mmol/L    ANION GAP 7 4 - 13 mmol/L    BUN 6 5 - 25 mg/dL    Creatinine 0 86 0 60 - 1 30 mg/dL    Glucose 87 65 - 140 mg/dL    Calcium 8 2 (L) 8 3 - 10 1 mg/dL    eGFR 64 ml/min/1 73sq m   CBC    Collection Time: 05/27/22  5:29 AM   Result Value Ref Range    WBC 8 35 4 31 - 10 16 Thousand/uL    RBC 2 55 (L) 3 81 - 5 12 Million/uL    Hemoglobin 7 9 (L) 11 5 - 15 4 g/dL    Hematocrit 24 8 (L) 34 8 - 46 1 %    MCV 97 82 - 98 fL    MCH 31 0 26 8 - 34 3 pg    MCHC 31 9 31 4 - 37 4 g/dL    RDW 16 0 (H) 11 6 - 15 1 %    Platelets 240 (H) 263 - 390 Thousands/uL    MPV 9 3 8 9 - 12 7 fL             Imaging Studies: EGD    Result Date: 5/26/2022  Narrative: [facility] Operating Room [address] [place] [address] [phone] DATE OF SERVICE: 5/26/22 PHYSICIAN(S): Attending: Surinder Bunch MD Fellow: No Staff Documented INDICATION: Symptomatic anemia    No obvious GI bleeding  History of Savage-en-Y gastrojejunostomy  History of revision of pouch  POST-OP DIAGNOSIS: See the impression below  PREPROCEDURE: Informed consent was obtained for the procedure, including sedation  Risks of perforation, hemorrhage, adverse drug reaction and aspiration were discussed  The patient was placed in the left lateral decubitus position  Patient was explained about the risks and benefits of the procedure  Risks including but not limited to bleeding, infection, and perforation were explained in detail  Also explained about less than 100% sensitivity with the exam and other alternatives  DETAILS OF PROCEDURE: Patient was taken to the procedure room where a time out was performed to confirm correct patient and correct procedure  The patient underwent monitored anesthesia care, which was administered by an anesthesia professional  The patient's blood pressure, heart rate, level of consciousness, respirations and oxygen were monitored throughout the procedure  The scope was advanced to the second part of the duodenum  Retroflexion was performed in the fundus  The patient experienced no blood loss  The procedure was not difficult  The patient tolerated the procedure well  There were no apparent complications  ANESTHESIA INFORMATION: ASA: II Anesthesia Type: IV Sedation with Anesthesia MEDICATIONS: No administrations occurring from 1327 to 1429 on 05/26/22 FINDINGS: Single large, deep, irregular, benign-appearing ulcer in the gastric pouch and gastrojejunal anastomosis; performed cold forceps biopsy  Large ulcer noted at the anastomosis/distal gastric pouch  Staples noted in this area  No active bleeding from the ulceration  The ulceration is large measuring at least 2 5 x 3 cm  Regular Z-line 40 cm from the incisors SPECIMENS: ID Type Source Tests Collected by Time Destination 1 : Anastomotic Ulcer  Tissue Stomach TISSUE EXAM Mason Hurt MD 5/26/2022  2:00 PM      Impression: 1  Large ulcer noted near the anastomotic site and distal gastric remnant  Staples noted in this area  No active bleeding or stigmata of bleeding noted from the ulceration  Biopsies taken  2  Status post Savage-en-Y gastric bypass surgery  RECOMMENDATION: Await pathology results Follow up with PCP Schedule repeat EGD Carafate 1 g 4 times a day Proton pump inhibitor b i d  Repeat endoscopy in two months   Edison Pham MD     Colonoscopy    Result Date: 5/26/2022  Narrative: [address] [place] [address] Operating Room [address] [place] [address] [phone] DATE OF SERVICE: 5/26/22 PHYSICIAN(S): Attending: Edison Pham MD Fellow: No Staff Documented INDICATION: Symptomatic anemia  Possible colonic lesion  Status post Savage-en-Y gastric bypass surgery POST-OP DIAGNOSIS: See the impression below  HISTORY: Prior colonoscopy: 5 years ago  BOWEL PREPARATION: Golytely/Colyte/Trilyte; Enema (enema times 2) PREPROCEDURE: Informed consent was obtained for the procedure, including sedation  Risks including but not limited to bleeding, infection, perforation, adverse drug reaction and aspiration were explained in detail  Also explained about less than 100% sensitivity with the exam and other alternatives  The patient was placed in the left lateral decubitus position  DETAILS OF PROCEDURE: Patient was taken to the procedure room where a time out was performed to confirm correct patient and correct procedure  The patient underwent monitored anesthesia care, which was administered by an anesthesia professional  The patient's blood pressure, heart rate, level of consciousness, oxygen and respirations were monitored throughout the procedure  A digital rectal exam was performed  The scope was introduced through the anus and advanced to the cecum  Retroflexion was performed in the rectum   The quality of bowel preparation was evaluated using the [facility] Bowel Preparation Scale with scores of: right colon = 2, transverse colon = 2, left colon = 2  The total BBPS score was 6  Bowel prep was adequate  The patient experienced no blood loss  The procedure was not difficult  The patient tolerated the procedure well  There were no apparent complications  ANESTHESIA INFORMATION: ASA: II Anesthesia Type: IV Sedation with Anesthesia MEDICATIONS: No administrations occurring from 1327 to 1429 on 05/26/22 FINDINGS: Few small mild diverticula in the sigmoid colon EVENTS: Procedure Events Event Event Time ENDO CECUM REACHED 5/26/2022  2:19 PM ENDO SCOPE OUT TIME 5/26/2022  2:25 PM SPECIMENS: ID Type Source Tests Collected by Time Destination 1 : Anastomotic Ulcer  Tissue Stomach TISSUE EXAM Karthikeyan Escalera MD 5/26/2022  2:00 PM  EQUIPMENT: Colonoscope - [id number]     Impression: 1  There is no lesion in the colon to explain bleeding  Bleeding high gastric anastomotic ulcer has been identified  RECOMMENDATION: Follow up with PCP Follow up with me in clinic No further screening colonoscopies necessary due to advanced age and other medical problems  Karthikeyan Escalera MD     CT chest abdomen pelvis wo contrast    Result Date: 5/24/2022  Narrative: CT CHEST, ABDOMEN AND PELVIS WITHOUT IV CONTRAST INDICATION:   trauma x 24hrs/constipation  COMPARISON:  None  TECHNIQUE: CT examination of the chest, abdomen and pelvis was performed without intravenous contrast  This examination was performed without intravenous contrast in the context of the critical nationwide Omnipaque shortage  Axial, sagittal, and coronal 2D reformatted images were created from the source data and submitted for interpretation  3D reconstructions of the bony thorax were performed in order to improved sensitivity of evaluation for rib fractures  Radiation dose length product (DLP) for this visit:  303 21 mGy-cm     This examination, like all CT scans performed in the [facility], was performed utilizing techniques to minimize radiation dose exposure, including the use of iterative  reconstruction and automated exposure control  Enteric contrast was administered  FINDINGS: CHEST LUNGS:  Pleural-parenchymal scarring and pleural thickening both lung apices, increased from 2019  Peripheral scarring and atelectasis appears similar to prior exam  PLEURA:  Diffuse pleural thickening and scarring more pronounced in the right lung apex than previously  HEART/GREAT VESSELS: Cardiac size within normal limits  Coronary and aortic atherosclerosis  No thoracic aortic aneurysm  MEDIASTINUM AND GAEL:  Unremarkable  CHEST WALL AND LOWER NECK:  Unremarkable  ABDOMEN LIVER/BILIARY TREE:  Mild fatty changes  GALLBLADDER:  Gallstones without inflammation  SPLEEN:  Unremarkable  PANCREAS:  Unremarkable  ADRENAL GLANDS:  Unremarkable  KIDNEYS/URETERS:  Stable appearance  No obvious injury  STOMACH AND BOWEL:  Limited evaluation of GI tract without oral contrast   Postop changes compatible with Savage-en-Y gastric bypass bariatric surgery  No obvious bowel injury  No injury is seen  APPENDIX:  No findings to suggest appendicitis  ABDOMINOPELVIC CAVITY:  Small volume ascites in the pelvis measuring water attenuation  No free air  No lymphadenopathy  VESSELS:  Unremarkable for patient's age  PELVIS REPRODUCTIVE ORGANS:  Unremarkable for patient's age  URINARY BLADDER:  Mildly distended  ABDOMINAL WALL/INGUINAL REGIONS:  Unremarkable  OSSEOUS STRUCTURES:  Multilevel degenerative changes  No convincing fractures  Impression: Limited exam performed without oral or IV contrast  No visceral or osseous injury identified  Pleural-parenchymal scarring and pleural thickening both lung apices, increased from 2019  Consider follow-up chest CT in 6 months to assess for progression  Mild fatty changes in liver  Expected postoperative changes  Cholelithiasis without CT evidence of cholecystitis  Small volume water attenuation ascites   Workstation performed: [id number]     CT head without contrast    Result Date: 5/24/2022  Narrative: CT BRAIN - WITHOUT CONTRAST INDICATION:   Head trauma, minor (Age >= 65y) trauma  COMPARISON:  April 2, 2013 TECHNIQUE:  CT examination of the brain was performed  In addition to axial images, sagittal and coronal 2D reformatted images were created and submitted for interpretation  Radiation dose length product (DLP) for this visit:  858 45 mGy-cm   This examination, like all CT scans performed in the [facility], was performed utilizing techniques to minimize radiation dose exposure, including the use of iterative  reconstruction and automated exposure control  IMAGE QUALITY:  Diagnostic  FINDINGS: PARENCHYMA: Decreased attenuation is noted in periventricular and subcortical white matter demonstrating an appearance that is statistically most likely to represent mild microangiopathic change  No CT signs of acute infarction  No intracranial mass, mass effect or midline shift  No acute parenchymal hemorrhage  VENTRICLES AND EXTRA-AXIAL SPACES:  Normal for the patient's age  VISUALIZED ORBITS AND PARANASAL SINUSES:  No acute abnormality involving the orbits  Mild scattered sinus mucosal thickening is noted  No fluid levels are seen  CALVARIUM AND EXTRACRANIAL SOFT TISSUES:  Opacification of some of the right-sided mastoid air cells which is chronic; and to lesser extent a few left side mastoid air cells which is new  No calvarial fracture or soft tissue injury  Impression: No acute intracranial abnormality  Workstation performed: [id number]     CT cervical spine without contrast    Result Date: 5/24/2022  Narrative: CT CERVICAL SPINE - WITHOUT CONTRAST INDICATION:   Neck trauma (Age >= 65y) trauma  COMPARISON:  April 2, 2013 TECHNIQUE:  CT examination of the cervical spine was performed without intravenous contrast   Contiguous axial images were obtained  Sagittal and coronal reconstructions were performed    Radiation dose length product (DLP) for this visit:  236 01 mGy-cm   This examination, like all CT scans performed in the [facility], was performed utilizing techniques to minimize radiation dose exposure, including the use of iterative  reconstruction and automated exposure control  IMAGE QUALITY:  Diagnostic  FINDINGS: ALIGNMENT:  Normal alignment of the cervical spine  No subluxation  VERTEBRAL BODIES:  No fracture  DEGENERATIVE CHANGES:  Moderate multilevel cervical degenerative changes are noted  No critical central canal stenosis  PREVERTEBRAL AND PARASPINAL SOFT TISSUES:  Unremarkable  THORACIC INLET:  Pleural-parenchymal scarring both lung apices appears similar Chronic bilateral mastoid disease  Impression: No cervical spine fracture or traumatic malalignment  Workstation performed: [id number]     CTA ED chest PE study    Result Date: 5/24/2022  Narrative: CTA - CHEST WITH IV CONTRAST - PULMONARY ANGIOGRAM INDICATION:   sob  "Patient here with complaint of increasing shortness of breath for the past few months  She states the symptoms worsen with exertion  She also c/o persistent bony pain  " COMPARISON: CT chest abdomen pelvis 5/24/2022 TECHNIQUE: CTA examination of the chest was performed using angiographic technique according to a protocol specifically tailored to evaluate for pulmonary embolism  Axial, sagittal, and coronal 2D reformatted images were created from the source data and  submitted for interpretation  In addition, coronal 3D MIP postprocessing was performed on the acquisition scanner  Radiation dose length product (DLP) for this visit:  301 52 mGy-cm   This examination, like all CT scans performed in the [facility], was performed utilizing techniques to minimize radiation dose exposure, including the use of iterative  reconstruction and automated exposure control   IV Contrast:  70 mL of iohexol (OMNIPAQUE)  FINDINGS: PULMONARY ARTERIAL TREE:  No pulmonary embolus is seen  LUNGS:  No focal infiltrate or consolidation  Mild mosaic attenuation of the pulmonary parenchyma most prominent through the upper lobes likely on the basis of a chronic small vessel or small airway disease, possibly smoking-related  PLEURA:  Unremarkable  HEART/GREAT VESSELS:  Unremarkable for patient's age  No thoracic aortic aneurysm  MEDIASTINUM AND GAEL:  Unremarkable  CHEST WALL AND LOWER NECK:   Unremarkable  VISUALIZED STRUCTURES IN THE UPPER ABDOMEN:  Status post gastric bypass  OSSEOUS STRUCTURES:  No acute fracture or destructive osseous lesion  Impression: No acute finding; specifically, no acute pulmonary arterial embolism  Workstation performed: [id number]     Echo complete w/ contrast if indicated    Result Date: 5/25/2022  Narrative: Meena Kate  Left Ventricle: Left ventricular cavity size is normal  Wall thickness is normal  The left ventricular ejection fraction is 55% by visual estimation  Systolic function is normal  Wall motion is normal  Diastolic function is mildly abnormal, consistent with grade I (abnormal) relaxation    Mitral Valve: There is mild regurgitation    Tricuspid Valve: There is mild to moderate regurgitation  Pulmonary artery pressure around 30-35 mmHg  [place], CRNP      Please Note: "This note has been constructed using a voice recognition system  Therefore there may be syntax, spelling, and/or grammatical errors   Please call if you have any questions  "**

## 2022-05-27 NOTE — PLAN OF CARE
Problem: Potential for Falls  Goal: Patient will remain free of falls  Description: INTERVENTIONS:  - Educate patient/family on patient safety including physical limitations  - Instruct patient to call for assistance with activity   - Consult OT/PT to assist with strengthening/mobility   - Keep Call bell within reach  - Keep bed low and locked with side rails adjusted as appropriate  - Keep care items and personal belongings within reach  - Initiate and maintain comfort rounds  - Make Fall Risk Sign visible to staff  - Offer Toileting every 2  Hours, in advance of need  - Initiate/Maintain bed alarm  - Obtain necessary fall risk management equipment: bed alarm  - Apply yellow socks and bracelet for high fall risk patients  - Consider moving patient to room near nurses station  5/27/2022 0320 by Keila Lang RN  Outcome: Progressing  5/27/2022 0320 by Keila Lang RN  Outcome: Progressing     Problem: Nutrition/Hydration-ADULT  Goal: Nutrient/Hydration intake appropriate for improving, restoring or maintaining nutritional needs  Description: Monitor and assess patient's nutrition/hydration status for malnutrition  Collaborate with interdisciplinary team and initiate plan and interventions as ordered  Monitor patient's weight and dietary intake as ordered or per policy  Utilize nutrition screening tool and intervene as necessary  Determine patient's food preferences and provide high-protein, high-caloric foods as appropriate       INTERVENTIONS:  - Monitor oral intake, urinary output, labs, and treatment plans  - Assess nutrition and hydration status and recommend course of action  - Evaluate amount of meals eaten  - Assist patient with eating if necessary   - Allow adequate time for meals  - Recommend/ encourage appropriate diets, oral nutritional supplements, and vitamin/mineral supplements  - Order, calculate, and assess calorie counts as needed  - Recommend, monitor, and adjust tube feedings and TPN/PPN based on assessed needs  - Assess need for intravenous fluids  - Provide specific nutrition/hydration education as appropriate  - Include patient/family/caregiver in decisions related to nutrition  5/27/2022 0320 by Sarah Funes RN  Outcome: Progressing  5/27/2022 0320 by Sarah Funes RN  Outcome: Progressing     Problem: MOBILITY - ADULT  Goal: Maintain or return to baseline ADL function  Description: INTERVENTIONS:  -  Assess patient's ability to carry out ADLs; assess patient's baseline for ADL function and identify physical deficits which impact ability to perform ADLs (bathing, care of mouth/teeth, toileting, grooming, dressing, etc )  - Assess/evaluate cause of self-care deficits   - Assess range of motion  - Assess patient's mobility; develop plan if impaired  - Assess patient's need for assistive devices and provide as appropriate  - Encourage maximum independence but intervene and supervise when necessary  - Involve family in performance of ADLs  - Assess for home care needs following discharge   - Consider OT consult to assist with ADL evaluation and planning for discharge  - Provide patient education as appropriate  5/27/2022 0320 by Sarah Funes RN  Outcome: Progressing  5/27/2022 0320 by Sarah Funes RN  Outcome: Progressing  Goal: Maintains/Returns to pre admission functional level  Description: INTERVENTIONS:  - Perform BMAT or MOVE assessment daily    - Set and communicate daily mobility goal to care team and patient/family/caregiver     - Collaborate with rehabilitation services on mobility goals if consulted  - Ambulate patient 3 times a day  - Out of bed to chair 3 times a day   - Out of bed for meals 3 times a day  - Out of bed for toileting  - Record patient progress and toleration of activity level   5/27/2022 0320 by Sarah Funes RN  Outcome: Progressing  5/27/2022 0320 by Sarah Funes RN  Outcome: Progressing     Problem: PAIN - ADULT  Goal: Verbalizes/displays adequate comfort level or baseline comfort level  Description: Interventions:  - Encourage patient to monitor pain and request assistance  - Assess pain using appropriate pain scale  - Administer analgesics based on type and severity of pain and evaluate response  - Implement non-pharmacological measures as appropriate and evaluate response  - Consider cultural and social influences on pain and pain management  - Notify physician/advanced practitioner if interventions unsuccessful or patient reports new pain  5/27/2022 0320 by Gerhardt Lynn, RN  Outcome: Progressing  5/27/2022 0320 by Gerhardt Lynn, RN  Outcome: Progressing     Problem: INFECTION - ADULT  Goal: Absence or prevention of progression during hospitalization  Description: INTERVENTIONS:  - Assess and monitor for signs and symptoms of infection  - Monitor lab/diagnostic results  - Monitor all insertion sites, i e  indwelling lines, tubes, and drains  - Monitor endotracheal if appropriate and nasal secretions for changes in amount and color  - [place] appropriate cooling/warming therapies per order  - Administer medications as ordered  - Instruct and encourage patient and family to use good hand hygiene technique  - Identify and instruct in appropriate isolation precautions for identified infection/condition  5/27/2022 0320 by Gerhardt Lynn, RN  Outcome: Progressing  5/27/2022 0320 by Gerhardt Lynn, RN  Outcome: Progressing     Problem: SAFETY ADULT  Goal: Patient will remain free of falls  Description: INTERVENTIONS:  - Educate patient/family on patient safety including physical limitations  - Instruct patient to call for assistance with activity   - Consult OT/PT to assist with strengthening/mobility   - Keep Call bell within reach  - Keep bed low and locked with side rails adjusted as appropriate  - Keep care items and personal belongings within reach  - Initiate and maintain comfort rounds  - Make Fall Risk Sign visible to staff  - Offer Toileting every 2 Hours, in advance of need  - Initiate/Maintain bed alarm  - Obtain necessary fall risk management equipment: bed alarm  - Apply yellow socks and bracelet for high fall risk patients  - Consider moving patient to room near nurses station  5/27/2022 0320 by Abdias Yanez RN  Outcome: Progressing  5/27/2022 0320 by Abdias Yanez RN  Outcome: Progressing  Goal: Maintain or return to baseline ADL function  Description: INTERVENTIONS:  -  Assess patient's ability to carry out ADLs; assess patient's baseline for ADL function and identify physical deficits which impact ability to perform ADLs (bathing, care of mouth/teeth, toileting, grooming, dressing, etc )  - Assess/evaluate cause of self-care deficits   - Assess range of motion  - Assess patient's mobility; develop plan if impaired  - Assess patient's need for assistive devices and provide as appropriate  - Encourage maximum independence but intervene and supervise when necessary  - Involve family in performance of ADLs  - Assess for home care needs following discharge   - Consider OT consult to assist with ADL evaluation and planning for discharge  - Provide patient education as appropriate  5/27/2022 0320 by Abdias Yanez RN  Outcome: Progressing  5/27/2022 0320 by Abdias Yanez RN  Outcome: Progressing  Goal: Maintains/Returns to pre admission functional level  Description: INTERVENTIONS:  - Perform BMAT or MOVE assessment daily    - Set and communicate daily mobility goal to care team and patient/family/caregiver     - Collaborate with rehabilitation services on mobility goals if consulted  - Ambulate patient 3 times a day  - Out of bed to chair 3 times a day   - Out of bed for meals 3 times a day  - Out of bed for toileting  - Record patient progress and toleration of activity level   5/27/2022 0320 by Abdias Yanez RN  Outcome: Progressing  5/27/2022 0320 by Abdias Yanez RN  Outcome: Progressing     Problem: DISCHARGE PLANNING  Goal: Discharge to home or other facility with appropriate resources  Description: INTERVENTIONS:  - Identify barriers to discharge w/patient and caregiver  - Arrange for needed discharge resources and transportation as appropriate  - Identify discharge learning needs (meds, wound care, etc )  - Arrange for interpretive services to assist at discharge as needed  - Refer to Case Management Department for coordinating discharge planning if the patient needs post-hospital services based on physician/advanced practitioner order or complex needs related to functional status, cognitive ability, or social support system  5/27/2022 0320 by Christopher Rico RN  Outcome: Progressing  5/27/2022 0320 by Christopher Rico RN  Outcome: Progressing     Problem: Knowledge Deficit  Goal: Patient/family/caregiver demonstrates understanding of disease process, treatment plan, medications, and discharge instructions  Description: Complete learning assessment and assess knowledge base    Interventions:  - Provide teaching at level of understanding  - Provide teaching via preferred learning methods  5/27/2022 0320 by Christopher Rico RN  Outcome: Progressing  5/27/2022 0320 by Christopher Rico RN  Outcome: Progressing     Problem: CARDIOVASCULAR - ADULT  Goal: Maintains optimal cardiac output and hemodynamic stability  Description: INTERVENTIONS:  - Monitor I/O, vital signs and rhythm  - Monitor for S/S and trends of decreased cardiac output  - Administer and titrate ordered vasoactive medications to optimize hemodynamic stability  - Assess quality of pulses, skin color and temperature  - Assess for signs of decreased coronary artery perfusion  - Instruct patient to report change in severity of symptoms  5/27/2022 0320 by Christopher Rico RN  Outcome: Progressing  5/27/2022 0320 by Christopher Rico RN  Outcome: Progressing  Goal: Absence of cardiac dysrhythmias or at baseline rhythm  Description: INTERVENTIONS:  - Continuous cardiac monitoring, vital signs, obtain 12 lead EKG if ordered  - Administer antiarrhythmic and heart rate control medications as ordered  - Monitor electrolytes and administer replacement therapy as ordered  5/27/2022 0320 by Ge Sood RN  Outcome: Progressing  5/27/2022 0320 by Ge Sood RN  Outcome: Progressing     Problem: HEMATOLOGIC - ADULT  Goal: Maintains hematologic stability  Description: INTERVENTIONS  - Assess for signs and symptoms of bleeding or hemorrhage  - Monitor labs  - Administer supportive blood products/factors as ordered and appropriate  5/27/2022 0320 by Ge Sood RN  Outcome: Progressing  5/27/2022 0320 by Ge Sood RN  Outcome: Progressing

## 2022-05-27 NOTE — PLAN OF CARE
Problem: Potential for Falls  Goal: Patient will remain free of falls  Description: INTERVENTIONS:  - Educate patient/family on patient safety including physical limitations  - Instruct patient to call for assistance with activity   - Consult OT/PT to assist with strengthening/mobility   - Keep Call bell within reach  - Keep bed low and locked with side rails adjusted as appropriate  - Keep care items and personal belongings within reach  - Initiate and maintain comfort rounds  - Make Fall Risk Sign visible to staff  - Offer Toileting every 2Hours, in advance of need  - Initiate/Maintain bed/chair alarm  - Obtain necessary fall risk management equipment: walker  - Apply yellow socks and bracelet for high fall risk patients  - Consider moving patient to room near nurses station  Outcome: Progressing

## 2022-05-28 ENCOUNTER — APPOINTMENT (INPATIENT)
Dept: RADIOLOGY | Facility: HOSPITAL | Age: 77
DRG: 381 | End: 2022-05-28
Payer: COMMERCIAL

## 2022-05-28 VITALS
WEIGHT: 118.7 LBS | DIASTOLIC BLOOD PRESSURE: 54 MMHG | BODY MASS INDEX: 21.84 KG/M2 | SYSTOLIC BLOOD PRESSURE: 108 MMHG | TEMPERATURE: 99.3 F | OXYGEN SATURATION: 92 % | HEART RATE: 85 BPM | RESPIRATION RATE: 16 BRPM | HEIGHT: 62 IN

## 2022-05-28 PROBLEM — K25.9 GASTRIC ULCER: Status: ACTIVE | Noted: 2022-05-28

## 2022-05-28 LAB
ANION GAP SERPL CALCULATED.3IONS-SCNC: 7 MMOL/L (ref 4–13)
BACTERIA UR QL AUTO: ABNORMAL /HPF
BILIRUB UR QL STRIP: NEGATIVE
BUN SERPL-MCNC: 7 MG/DL (ref 5–25)
CALCIUM SERPL-MCNC: 8.2 MG/DL (ref 8.3–10.1)
CHLORIDE SERPL-SCNC: 104 MMOL/L (ref 100–108)
CLARITY UR: CLEAR
CO2 SERPL-SCNC: 25 MMOL/L (ref 21–32)
COLOR UR: ABNORMAL
CREAT SERPL-MCNC: 1.03 MG/DL (ref 0.6–1.3)
ERYTHROCYTE [DISTWIDTH] IN BLOOD BY AUTOMATED COUNT: 15.9 % (ref 11.6–15.1)
GFR SERPL CREATININE-BSD FRML MDRD: 52 ML/MIN/1.73SQ M
GLUCOSE SERPL-MCNC: 92 MG/DL (ref 65–140)
GLUCOSE UR STRIP-MCNC: NEGATIVE MG/DL
HCT VFR BLD AUTO: 27 % (ref 34.8–46.1)
HGB BLD-MCNC: 8.3 G/DL (ref 11.5–15.4)
HGB UR QL STRIP.AUTO: ABNORMAL
KETONES UR STRIP-MCNC: NEGATIVE MG/DL
LEUKOCYTE ESTERASE UR QL STRIP: NEGATIVE
MCH RBC QN AUTO: 30.9 PG (ref 26.8–34.3)
MCHC RBC AUTO-ENTMCNC: 30.7 G/DL (ref 31.4–37.4)
MCV RBC AUTO: 100 FL (ref 82–98)
NITRITE UR QL STRIP: NEGATIVE
NON-SQ EPI CELLS URNS QL MICRO: ABNORMAL /HPF
PH UR STRIP.AUTO: 5.5 [PH]
PLATELET # BLD AUTO: 532 THOUSANDS/UL (ref 149–390)
PMV BLD AUTO: 9.1 FL (ref 8.9–12.7)
POTASSIUM SERPL-SCNC: 3.5 MMOL/L (ref 3.5–5.3)
PROCALCITONIN SERPL-MCNC: 0.11 NG/ML
PROT UR STRIP-MCNC: NEGATIVE MG/DL
RBC # BLD AUTO: 2.69 MILLION/UL (ref 3.81–5.12)
RBC #/AREA URNS AUTO: ABNORMAL /HPF
SODIUM SERPL-SCNC: 136 MMOL/L (ref 136–145)
SP GR UR STRIP.AUTO: 1.01 (ref 1–1.03)
UROBILINOGEN UR QL STRIP.AUTO: 0.2 E.U./DL
WBC # BLD AUTO: 10.73 THOUSAND/UL (ref 4.31–10.16)
WBC #/AREA URNS AUTO: ABNORMAL /HPF

## 2022-05-28 PROCEDURE — 71045 X-RAY EXAM CHEST 1 VIEW: CPT

## 2022-05-28 PROCEDURE — 99239 HOSP IP/OBS DSCHRG MGMT >30: CPT | Performed by: INTERNAL MEDICINE

## 2022-05-28 PROCEDURE — 80048 BASIC METABOLIC PNL TOTAL CA: CPT | Performed by: FAMILY MEDICINE

## 2022-05-28 PROCEDURE — 85027 COMPLETE CBC AUTOMATED: CPT | Performed by: FAMILY MEDICINE

## 2022-05-28 PROCEDURE — 84145 PROCALCITONIN (PCT): CPT | Performed by: FAMILY MEDICINE

## 2022-05-28 PROCEDURE — 81001 URINALYSIS AUTO W/SCOPE: CPT | Performed by: FAMILY MEDICINE

## 2022-05-28 RX ORDER — SUCRALFATE 1 G/1
1 TABLET ORAL
Qty: 120 TABLET | Refills: 0 | Status: SHIPPED | OUTPATIENT
Start: 2022-05-28 | End: 2022-06-27

## 2022-05-28 RX ORDER — PANTOPRAZOLE SODIUM 40 MG/1
40 TABLET, DELAYED RELEASE ORAL
Qty: 60 TABLET | Refills: 0 | Status: SHIPPED | OUTPATIENT
Start: 2022-05-28 | End: 2022-08-09

## 2022-05-28 RX ADMIN — SUCRALFATE 1 G: 1 TABLET ORAL at 11:58

## 2022-05-28 RX ADMIN — PANTOPRAZOLE SODIUM 40 MG: 40 TABLET, DELAYED RELEASE ORAL at 16:05

## 2022-05-28 RX ADMIN — SUCRALFATE 1 G: 1 TABLET ORAL at 06:39

## 2022-05-28 RX ADMIN — LEVOTHYROXINE SODIUM 112 MCG: 112 TABLET ORAL at 06:39

## 2022-05-28 RX ADMIN — PANTOPRAZOLE SODIUM 40 MG: 40 TABLET, DELAYED RELEASE ORAL at 06:39

## 2022-05-28 RX ADMIN — SUCRALFATE 1 G: 1 TABLET ORAL at 16:05

## 2022-05-28 NOTE — PLAN OF CARE
Problem: Potential for Falls  Goal: Patient will remain free of falls  Description: INTERVENTIONS:  - Educate patient/family on patient safety including physical limitations  - Instruct patient to call for assistance with activity   - Consult OT/PT to assist with strengthening/mobility   - Keep Call bell within reach  - Keep bed low and locked with side rails adjusted as appropriate  - Keep care items and personal belongings within reach  - Initiate and maintain comfort rounds  - Make Fall Risk Sign visible to staff  - Offer Toileting every 2 Hours, in advance of need  - Initiate/Maintain bed alarm  - Obtain necessary fall risk management equipment: yellow socks  - Apply yellow socks and bracelet for high fall risk patients  - Consider moving patient to room near nurses station  Outcome: Progressing     Problem: Nutrition/Hydration-ADULT  Goal: Nutrient/Hydration intake appropriate for improving, restoring or maintaining nutritional needs  Description: Monitor and assess patient's nutrition/hydration status for malnutrition  Collaborate with interdisciplinary team and initiate plan and interventions as ordered  Monitor patient's weight and dietary intake as ordered or per policy  Utilize nutrition screening tool and intervene as necessary  Determine patient's food preferences and provide high-protein, high-caloric foods as appropriate       INTERVENTIONS:  - Monitor oral intake, urinary output, labs, and treatment plans  - Assess nutrition and hydration status and recommend course of action  - Evaluate amount of meals eaten  - Assist patient with eating if necessary   - Allow adequate time for meals  - Recommend/ encourage appropriate diets, oral nutritional supplements, and vitamin/mineral supplements  - Order, calculate, and assess calorie counts as needed  - Recommend, monitor, and adjust tube feedings and TPN/PPN based on assessed needs  - Assess need for intravenous fluids  - Provide specific nutrition/hydration education as appropriate  - Include patient/family/caregiver in decisions related to nutrition  Outcome: Progressing     Problem: MOBILITY - ADULT  Goal: Maintain or return to baseline ADL function  Description: INTERVENTIONS:  -  Assess patient's ability to carry out ADLs; assess patient's baseline for ADL function and identify physical deficits which impact ability to perform ADLs (bathing, care of mouth/teeth, toileting, grooming, dressing, etc )  - Assess/evaluate cause of self-care deficits   - Assess range of motion  - Assess patient's mobility; develop plan if impaired  - Assess patient's need for assistive devices and provide as appropriate  - Encourage maximum independence but intervene and supervise when necessary  - Involve family in performance of ADLs  - Assess for home care needs following discharge   - Consider OT consult to assist with ADL evaluation and planning for discharge  - Provide patient education as appropriate  Outcome: Progressing  Goal: Maintains/Returns to pre admission functional level  Description: INTERVENTIONS:  - Perform BMAT or MOVE assessment daily    - Set and communicate daily mobility goal to care team and patient/family/caregiver  - Collaborate with rehabilitation services on mobility goals if consulted  - Perform Range of Motion 2 times a day  - Reposition patient every 2 hours    - Dangle patient 2 times a day  - Stand patient 2 times a day  - Ambulate patient 2 times a day  - Out of bed to chair 2 times a day   - Out of bed for meals 2 times a day  - Out of bed for toileting  - Record patient progress and toleration of activity level   Outcome: Progressing     Problem: PAIN - ADULT  Goal: Verbalizes/displays adequate comfort level or baseline comfort level  Description: Interventions:  - Encourage patient to monitor pain and request assistance  - Assess pain using appropriate pain scale  - Administer analgesics based on type and severity of pain and evaluate response  - Implement non-pharmacological measures as appropriate and evaluate response  - Consider cultural and social influences on pain and pain management  - Notify physician/advanced practitioner if interventions unsuccessful or patient reports new pain  Outcome: Progressing     Problem: INFECTION - ADULT  Goal: Absence or prevention of progression during hospitalization  Description: INTERVENTIONS:  - Assess and monitor for signs and symptoms of infection  - Monitor lab/diagnostic results  - Monitor all insertion sites, i e  indwelling lines, tubes, and drains  - Monitor endotracheal if appropriate and nasal secretions for changes in amount and color  - [place] appropriate cooling/warming therapies per order  - Administer medications as ordered  - Instruct and encourage patient and family to use good hand hygiene technique  - Identify and instruct in appropriate isolation precautions for identified infection/condition  Outcome: Progressing     Problem: SAFETY ADULT  Goal: Patient will remain free of falls  Description: INTERVENTIONS:  - Educate patient/family on patient safety including physical limitations  - Instruct patient to call for assistance with activity   - Consult OT/PT to assist with strengthening/mobility   - Keep Call bell within reach  - Keep bed low and locked with side rails adjusted as appropriate  - Keep care items and personal belongings within reach  - Initiate and maintain comfort rounds  - Make Fall Risk Sign visible to staff  - Offer Toileting every 2 Hours, in advance of need  - Initiate/Maintain bed alarm  - Obtain necessary fall risk management equipment: yellow socks  - Apply yellow socks and bracelet for high fall risk patients  - Consider moving patient to room near nurses station  Outcome: Progressing  Goal: Maintain or return to baseline ADL function  Description: INTERVENTIONS:  -  Assess patient's ability to carry out ADLs; assess patient's baseline for ADL function and identify physical deficits which impact ability to perform ADLs (bathing, care of mouth/teeth, toileting, grooming, dressing, etc )  - Assess/evaluate cause of self-care deficits   - Assess range of motion  - Assess patient's mobility; develop plan if impaired  - Assess patient's need for assistive devices and provide as appropriate  - Encourage maximum independence but intervene and supervise when necessary  - Involve family in performance of ADLs  - Assess for home care needs following discharge   - Consider OT consult to assist with ADL evaluation and planning for discharge  - Provide patient education as appropriate  Outcome: Progressing  Goal: Maintains/Returns to pre admission functional level  Description: INTERVENTIONS:  - Perform BMAT or MOVE assessment daily    - Set and communicate daily mobility goal to care team and patient/family/caregiver  - Collaborate with rehabilitation services on mobility goals if consulted  - Perform Range of Motion 2 times a day  - Reposition patient every 2 hours    - Dangle patient 2 times a day  - Stand patient 2 times a day  - Ambulate patient 2 times a day  - Out of bed to chair 2 times a day   - Out of bed for meals 2 times a day  - Out of bed for toileting  - Record patient progress and toleration of activity level   Outcome: Progressing     Problem: DISCHARGE PLANNING  Goal: Discharge to home or other facility with appropriate resources  Description: INTERVENTIONS:  - Identify barriers to discharge w/patient and caregiver  - Arrange for needed discharge resources and transportation as appropriate  - Identify discharge learning needs (meds, wound care, etc )  - Arrange for interpretive services to assist at discharge as needed  - Refer to Case Management Department for coordinating discharge planning if the patient needs post-hospital services based on physician/advanced practitioner order or complex needs related to functional status, cognitive ability, or social support system  Outcome: Progressing     Problem: Knowledge Deficit  Goal: Patient/family/caregiver demonstrates understanding of disease process, treatment plan, medications, and discharge instructions  Description: Complete learning assessment and assess knowledge base    Interventions:  - Provide teaching at level of understanding  - Provide teaching via preferred learning methods  Outcome: Progressing     Problem: CARDIOVASCULAR - ADULT  Goal: Maintains optimal cardiac output and hemodynamic stability  Description: INTERVENTIONS:  - Monitor I/O, vital signs and rhythm  - Monitor for S/S and trends of decreased cardiac output  - Administer and titrate ordered vasoactive medications to optimize hemodynamic stability  - Assess quality of pulses, skin color and temperature  - Assess for signs of decreased coronary artery perfusion  - Instruct patient to report change in severity of symptoms  Outcome: Progressing  Goal: Absence of cardiac dysrhythmias or at baseline rhythm  Description: INTERVENTIONS:  - Continuous cardiac monitoring, vital signs, obtain 12 lead EKG if ordered  - Administer antiarrhythmic and heart rate control medications as ordered  - Monitor electrolytes and administer replacement therapy as ordered  Outcome: Progressing     Problem: HEMATOLOGIC - ADULT  Goal: Maintains hematologic stability  Description: INTERVENTIONS  - Assess for signs and symptoms of bleeding or hemorrhage  - Monitor labs  - Administer supportive blood products/factors as ordered and appropriate  Outcome: Progressing

## 2022-05-28 NOTE — ASSESSMENT & PLAN NOTE
EGD showed single large ulcer at the anastomotic site and distal gastric remnant without any active bleeding  Continue Protonix 40 milligram p o  B i d   And Carafate 4 times a day  Patient will need repeat EGD in 2 months

## 2022-05-28 NOTE — DISCHARGE SUMMARY
Stefanfarzanehmarco a 43  Discharge- Grey Danielle 1945, 64 y o  female MRN: [id number]  Unit/Bed#: [address] [place] [address] Encounter: [id number]  Primary Care Provider: Tian Hawthorne MD   Date and time admitted to hospital: 5/24/2022  3:37 PM    * Anemia  Assessment & Plan  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  There were multiple cratered, linear ulcers in the body and at the anastomosis  Had normal colonoscopy at the same time  Repeat EGD in 5/2021 showed healed ulcers  Patient denies evidence of bleeding at home  Taking iron supplement whenever she remembers it  No longer taking Protonix  Suspect presenting symptoms due to anemia  Status post 1 unit RBC transfusion  Status post colonoscopy with no lesion or bleeding  Also status post EGD yesterday which showed large ulcer near anastomotic site and distal gastric remnant  No active bleeding was noted  - will need repeat EGD with GI after discharge  Continue PPI b i d  And Carafate 4 times a day  iron profile reviewed  Patient with low iron, TIBC, high ferritin, iron sat of 19  B12 1300, folate 17  Patient received 3 dose of IV Venofer during hospitalization  Hemoglobin has been stable      Fever  Assessment & Plan  Patient with T-max of 101 2°, no obvious source of infection  - CTA was negative for pneumonia  COVID test was negative  Blood cultures x2 negative at 24 hours  Procalcitonin level was negative x2  Possible secondary to atelectasis  Repeat chest x-ray was also performed no showed no acute cardiopulmonary disease  Encourage incentive spirometry  No indication for antibiotics      Gastric ulcer  Assessment & Plan  EGD showed single large ulcer at the anastomotic site and distal gastric remnant without any active bleeding  Continue Protonix 40 milligram p o  B i d  And Carafate 4 times a day  Patient will need repeat EGD in 2 months    Moderate protein-calorie malnutrition ([address] )  Assessment & Plan  Malnutrition Findings:   Adult Malnutrition type: Chronic illness  Adult Degree of Malnutrition: Malnutrition of moderate degree  Malnutrition Characteristics: Fat loss, Muscle loss                  360 Statement: Moderate malnutrition of chronic illness related to inadequate energy intake as evidenced by somewhat hollow orbits, some depression at the temples and slightly protruding clavicles  Awaiting treatment plan    BMI Findings: Body mass index is 21 77 kg/m²  H/O bariatric surgery - bypass  Assessment & Plan  Status post gastric bypass in 2010  Denies history of iron infusion    Abnormal CT scan  Assessment & Plan  Pleural-parenchymal scarring and pleural thickening both lung apices, increased from 2019  Follow-up chest CT in 6 months to assess for progression  Fatty changes in liver, cholelithiasis  Hyponatremia  Assessment & Plan  Sodium 131   Sodium level normal over the past 2 days  Likely due to poor intake  Patient initially received IV hydration  uric acid WNL  TSH normal       Generalized weakness  Assessment & Plan  Likely multifactorial due to anemia, fibromyalgia, hyponatremia  CT head, C-spine, chest abdomen pelvis no acute traumatic findings  TSH normal   UA negative for UTI  CT chest abdomen pelvis no evidence infection  Patient was seen by physical therapy  Patient is doing well  Will be discharged home with outpatient PT    Dyspnea on exertion  Assessment & Plan  Suspect due to anemia,+ component of anxiety  D-dimer 2 45  No PE on CTA  Continues to remain stable on room air  ProBNP 1527  No pulmonary edema on CT  No edema to lower extremity  Troponin 45-63     EKG normal sinus rhythm  Echo showed normal EF with grade 1 diastolic dysfunction        Fibromyalgia syndrome  Assessment & Plan  Patient reported whole body pain and having difficulty moving whole body/ambulating for a long time but worse in past 3 months  Saw orthopedic surgeon in February this year, was told to have bad osteoarthritis on knees  Patient underwent sodium hyaluronate injection to both knees in March this year  Continue Tylenol p r n  With outpatient follow-up with PCP    Mixed anxiety depressive disorder  Assessment & Plan  Continue Klonopin, trazodone at HS  Prior provider Verified Klonopin dose in [facility] website    Other specified hypothyroidism  Assessment & Plan  On Synthroid  TSH, free T4 normal     Elevated brain natriuretic peptide (BNP) level-resolved as of 5/27/2022  Assessment & Plan  2D echo as noted above  Daily weight and I&Os        Medical Problems             Resolved Problems  Date Reviewed: 5/28/2022          Resolved    Elevated brain natriuretic peptide (BNP) level 5/27/2022     Resolved by  Layne Perez DO              Discharging Physician / Practitioner: Ru Aquino MD  PCP: Milena Tovar MD  Admission Date:   Admission Orders (From admission, onward)     Ordered        05/25/22 1836  Inpatient Admission  Once            05/24/22 1932  Place in Observation  Once                      Discharge Date: 05/28/22    Consultations During Hospital Stay:  · Gastroenterology    Procedures Performed:   · EGD showed large ulcer near the anastomotic site and distal gastric remnant without any active bleeding, status post Savage-en-Y gastric bypass  · Colonoscopy showed few small mild diverticula in the sigmoid colon  · 2D echo showed normal EF without any significant valvular problems with mild to moderate tricuspid regurgitation       Outpatient Tests Requested:  · Follow-up with GI    Complications:  None    Reason for Admission:     Hospital Course:   Jalil Flores is a 64 y o  female patient with past medical history of gastric bypass, anxiety, hypothyroidism, osteoarthritis who originally presented to the hospital on 5/24/2022 due to generalized weakness and exertional dyspnea  In the ED patient noted to be anemic with a hemoglobin level of 7 8  Patient was ordered for 1 unit of PRBC transfusion and was seen in consultation with GI  Patient also noted to have hyponatremia which resolved with IV hydration  Patient later underwent EGD and colonoscopy which showed large gastric ulcer  She patient's diet was slowly advanced to non ulcerogenic diet which she was tolerating well  Patient continued remained stable on room air  Patient also seen by physical therapy  Later patient developed a fever but the septic workup has been negative and patient remained stable without any further fever  Patient be discharged home with an outpatient follow-up with GI  Please see above list of diagnoses and related plan for additional information  Condition at Discharge: stable    Discharge Day Visit / Exam:   Subjective:  Patient is feeling well  Anxious to go home  Denies any nausea, vomiting, chest pain, shortness a breath, palpitations  Vitals: Blood Pressure: 108/54 (05/28/22 0729)  Pulse: 85 (05/28/22 0729)  Temperature: 99 3 °F (37 4 °C) (05/28/22 0729)  Temp Source: Oral (05/28/22 0729)  Respirations: 16 (05/28/22 0729)  Height: 5' 2" (157 5 cm) (05/26/22 1304)  Weight - Scale: 53 8 kg (118 lb 11 2 oz) (05/28/22 0600)  SpO2: 92 % (05/28/22 0729)  Exam:   Physical Exam  Constitutional:       Appearance: Normal appearance  HENT:      Head: Normocephalic and atraumatic  Nose: Nose normal       Mouth/Throat:      Mouth: Mucous membranes are moist       Pharynx: Oropharynx is clear  Eyes:      Extraocular Movements: Extraocular movements intact  Pupils: Pupils are equal, round, and reactive to light  Cardiovascular:      Rate and Rhythm: Normal rate and regular rhythm  Heart sounds: Murmur heard  Pulmonary:      Effort: Pulmonary effort is normal       Breath sounds: Normal breath sounds  Abdominal:      General: Bowel sounds are normal  There is no distension  Palpations: Abdomen is soft  Tenderness: There is no abdominal tenderness  Musculoskeletal:         General: No swelling  Cervical back: Normal range of motion and neck supple  Skin:     General: Skin is warm and dry  Neurological:      General: No focal deficit present  Mental Status: She is alert  Discharge instructions/Information to patient and family:   See after visit summary for information provided to patient and family  Provisions for Follow-Up Care:  See after visit summary for information related to follow-up care and any pertinent home health orders  Disposition:   Home    Planned Readmission: No     Discharge Statement:  I spent 35 minutes discharging the patient  This time was spent on the day of discharge  I had direct contact with the patient on the day of discharge  Greater than 50% of the total time was spent examining patient, answering all patient questions, arranging and discussing plan of care with patient as well as directly providing post-discharge instructions  Additional time then spent on discharge activities  Discharge Medications:  See after visit summary for reconciled discharge medications provided to patient and/or family        **Please Note: This note may have been constructed using a voice recognition system**

## 2022-05-28 NOTE — NURSING NOTE
Zak removed  IV removed  Discharge instructions given to patient  [place] given to patient  No questions about discharge instructions or medications

## 2022-05-28 NOTE — CASE MANAGEMENT
Case Management Discharge Planning Note    Patient name Jalil Flores  Location [address] [place] [address] St-* MRN [id number]  : 1945 Date 2022       Current Admission Date: 2022  Current Admission Diagnosis:Anemia   Patient Active Problem List    Diagnosis Date Noted    Gastric ulcer 2022    Fever 2022    Moderate protein-calorie malnutrition ([address] ) 2022    Anemia 2022    Dyspnea on exertion 2022    Generalized weakness 2022    Hyponatremia 2022    Abnormal CT scan 2022    H/O bariatric surgery - bypass 2022    Cerumen debris on tympanic membrane of right ear 2022    Nasal congestion 2022    Bilateral hearing loss 2022    Fibromyalgia syndrome 2021    Osteopenia of both forearms 2021    Medicare annual wellness visit, subsequent 2021    Shortness of breath 2021    Acute bronchitis 2021    COVID-19 2021    Dysphasia 2020    Epigastric pain 2020    Other specified hypothyroidism 2020    Gastroesophageal reflux disease without esophagitis 2020    Mixed anxiety depressive disorder 2020    Fibromyalgia, primary 2020    Iron deficiency 2020    Numbness of foot 2020      LOS (days): 3  Geometric Mean LOS (GMLOS) (days): 3 20  Days to GMLOS:0 3     OBJECTIVE:  Risk of Unplanned Readmission Score: 11 96       Current admission status: Inpatient   Preferred Pharmacy:   Treva Feldman [address] [facility], [address] Aaron Whitaker [address] [place] [address]  Phone: [phone] Fax: [phone]    Primary Care Provider: Milena Tovar MD    Primary Insurance: [facility]  Secondary Insurance:     DISCHARGE DETAILS:    Discharge planning discussed with[de-identified] Patient  Freedom of Choice: Yes  Comments - Freedom of Choice: Patient is in agreement with a referral for [facility] and has no preference for facility  Referral sent to [address] in [place]  Were Treatment Team discharge recommendations reviewed with patient/caregiver?: Yes  Did patient/caregiver verbalize understanding of patient care needs?: Yes  Were patient/caregiver advised of the risks associated with not following Treatment Team discharge recommendations?: Yes    [address]         Is the patient interested in [address] at discharge?: Yes  [address] requested[de-identified] Occupational Therapy, Physical [address] Name[de-identified] [facility] Provider[de-identified] PCP  Home Health Services Needed[de-identified] Evaluate Functional Status and Safety, Strengthening/Theraputic Exercises to Improve Function, Gait/ADL Training  Homebound Criteria Met[de-identified] Requires the Assistance of Another Person for Safe Ambulation or to Leave the Home  Supporting Clincal Findings[de-identified] Fatigues Easliy in [employer], Limited Endurance    Other Referral/Resources/Interventions Provided:  Interventions: [facility]    Would you like to participate in our [address] [place] [address] Ashley Wilsonala service program?  : No - Declined     IMM Given (Date):: 05/28/22  IMM Given to[de-identified] Patient (IMM reviewed with and signed by patient  Patient is in agreement with discharge determination  Copy given to patient and copy placed in scan bin for chart )      SW spoke with patient at bedside to review discharge plan  Plan is to return home with outpatient PT but patient is not driving due to her health status and does not have anyone available to take her to PT appointments  Patient is in agreement with a referral for [address] and does not have a preference for agency  ARCHIE placed referral with [facility] in [place] and notified attending of same

## 2022-05-28 NOTE — PLAN OF CARE
Problem: PAIN - ADULT  Goal: Verbalizes/displays adequate comfort level or baseline comfort level  Description: Interventions:  - Encourage patient to monitor pain and request assistance  - Assess pain using appropriate pain scale  - Administer analgesics based on type and severity of pain and evaluate response  - Implement non-pharmacological measures as appropriate and evaluate response  - Consider cultural and social influences on pain and pain management  - Notify physician/advanced practitioner if interventions unsuccessful or patient reports new pain  5/28/2022 1434 by Louis Vieira RN  Outcome: Completed  5/28/2022 1434 by Louis Vieira RN  Outcome: Progressing     Problem: SAFETY ADULT  Goal: Patient will remain free of falls  Description: INTERVENTIONS:  - Educate patient/family on patient safety including physical limitations  - Instruct patient to call for assistance with activity   - Consult OT/PT to assist with strengthening/mobility   - Keep Call bell within reach  - Keep bed low and locked with side rails adjusted as appropriate  - Keep care items and personal belongings within reach  - Initiate and maintain comfort rounds  - Apply yellow socks and bracelet for high fall risk patients  - Consider moving patient to room near nurses station  5/28/2022 1434 by Louis Vieira RN  Outcome: Completed  5/28/2022 1434 by Louis Vieira RN  Outcome: Progressing  Goal: Maintain or return to baseline ADL function  Description: INTERVENTIONS:  -  Assess patient's ability to carry out ADLs; assess patient's baseline for ADL function and identify physical deficits which impact ability to perform ADLs (bathing, care of mouth/teeth, toileting, grooming, dressing, etc )  - Assess/evaluate cause of self-care deficits   - Assess range of motion  - Assess patient's mobility; develop plan if impaired  - Assess patient's need for assistive devices and provide as appropriate  - Encourage maximum independence but intervene and supervise when necessary  - Involve family in performance of ADLs  - Assess for home care needs following discharge   - Consider OT consult to assist with ADL evaluation and planning for discharge  - Provide patient education as appropriate  5/28/2022 1434 by Louis Vieira RN  Outcome: Completed  5/28/2022 1434 by Louis Vieira RN  Outcome: Progressing  Goal: Maintains/Returns to pre admission functional level  Description: INTERVENTIONS:  - Perform BMAT or MOVE assessment daily    - Set and communicate daily mobility goal to care team and patient/family/caregiver     - Collaborate with rehabilitation services on mobility goals if consulte  - Out of bed for toileting  - Record patient progress and toleration of activity level   5/28/2022 1434 by Louis Vieira RN  Outcome: Completed  5/28/2022 1434 by Louis Vieira RN  Outcome: Progressing     Problem: SAFETY ADULT  Goal: Patient will remain free of falls  Description: INTERVENTIONS:  - Educate patient/family on patient safety including physical limitations  - Instruct patient to call for assistance with activity   - Consult OT/PT to assist with strengthening/mobility   - Keep Call bell within reach  - Keep bed low and locked with side rails adjusted as appropriate  - Keep care items and personal belongings within reach  - Initiate and maintain comfort rounds  - Apply yellow socks and bracelet for high fall risk patients  - Consider moving patient to room near nurses station  5/28/2022 1434 by Louis Vieira RN  Outcome: Completed  5/28/2022 1434 by Louis Vieira RN  Outcome: Progressing  Goal: Maintain or return to baseline ADL function  Description: INTERVENTIONS:  -  Assess patient's ability to carry out ADLs; assess patient's baseline for ADL function and identify physical deficits which impact ability to perform ADLs (bathing, care of mouth/teeth, toileting, grooming, dressing, etc )  - Assess/evaluate cause of self-care deficits   - Assess range of motion  - Assess patient's mobility; develop plan if impaired  - Assess patient's need for assistive devices and provide as appropriate  - Encourage maximum independence but intervene and supervise when necessary  - Involve family in performance of ADLs  - Assess for home care needs following discharge   - Consider OT consult to assist with ADL evaluation and planning for discharge  - Provide patient education as appropriate  5/28/2022 1434 by Ling Blas RN  Outcome: Completed  5/28/2022 1434 by Ling Blas RN  Outcome: Progressing  Goal: Maintains/Returns to pre admission functional level  Description: INTERVENTIONS:  - Perform BMAT or MOVE assessment daily    - Set and communicate daily mobility goal to care team and patient/family/caregiver     - Collaborate with rehabilitation services on mobility goals if consulted  - Out of bed for toileting  - Record patient progress and toleration of activity level   5/28/2022 1434 by Ling Blas RN  Outcome: Completed  5/28/2022 1434 by Ling Blas RN  Outcome: Progressing

## 2022-05-29 ENCOUNTER — HOME HEALTH ADMISSION (OUTPATIENT)
Dept: HOME HEALTH SERVICES | Facility: HOME HEALTHCARE | Age: 77
End: 2022-05-29

## 2022-05-31 ENCOUNTER — HOME CARE VISIT (OUTPATIENT)
Dept: HOME HEALTH SERVICES | Facility: HOME HEALTHCARE | Age: 77
End: 2022-05-31

## 2022-05-31 ENCOUNTER — TRANSITIONAL CARE MANAGEMENT (OUTPATIENT)
Dept: FAMILY MEDICINE CLINIC | Facility: CLINIC | Age: 77
End: 2022-05-31

## 2022-05-31 NOTE — CASE COMMUNICATION
PC made to pt to schedule a home PT [facility] visit for Tuesday 05/31/22  No answer and unable to leave a message at the time of the [facility] due to voicemail box not setup

## 2022-05-31 NOTE — CASE COMMUNICATION
phone call to pts emergency contact (pts sister) to attempt to schedule home PT start of care visit for Wednesday 06/01/22  Per pts sister "she doesn't let anyone in the house  Not even family  She doesn't want home therapy cause you won't be able to get in and she won't let you in"  DC pt from skilled home PT services 2* reason given above      Thank you,  Sangeetha ULRICHT

## 2022-06-01 LAB
BACTERIA BLD CULT: NORMAL
BACTERIA BLD CULT: NORMAL

## 2022-06-11 ENCOUNTER — HOSPITAL ENCOUNTER (INPATIENT)
Facility: HOSPITAL | Age: 77
LOS: 5 days | DRG: 377 | End: 2022-06-16
Attending: EMERGENCY MEDICINE | Admitting: INTERNAL MEDICINE
Payer: COMMERCIAL

## 2022-06-11 DIAGNOSIS — Z91.19 MEDICAL NON-COMPLIANCE: ICD-10-CM

## 2022-06-11 DIAGNOSIS — K92.2 UPPER GI BLEED: ICD-10-CM

## 2022-06-11 DIAGNOSIS — D64.9 ANEMIA: ICD-10-CM

## 2022-06-11 DIAGNOSIS — D62 ACUTE BLOOD LOSS ANEMIA: ICD-10-CM

## 2022-06-11 DIAGNOSIS — F41.8 MIXED ANXIETY DEPRESSIVE DISORDER: ICD-10-CM

## 2022-06-11 DIAGNOSIS — I95.9 HYPOTENSION: ICD-10-CM

## 2022-06-11 DIAGNOSIS — E43 SEVERE PROTEIN-CALORIE MALNUTRITION (HCC): ICD-10-CM

## 2022-06-11 DIAGNOSIS — K92.0 HEMATEMESIS: Primary | ICD-10-CM

## 2022-06-11 DIAGNOSIS — K62.5 BRBPR (BRIGHT RED BLOOD PER RECTUM): ICD-10-CM

## 2022-06-11 PROBLEM — N17.9 AKI (ACUTE KIDNEY INJURY) (HCC): Status: ACTIVE | Noted: 2022-06-11

## 2022-06-11 PROBLEM — E03.9 HYPOTHYROIDISM: Status: ACTIVE | Noted: 2020-11-13

## 2022-06-11 LAB
ABO GROUP BLD: NORMAL
ALBUMIN SERPL BCP-MCNC: 2.3 G/DL (ref 3.5–5)
ALP SERPL-CCNC: 40 U/L (ref 34–104)
ALT SERPL W P-5'-P-CCNC: 8 U/L (ref 7–52)
ANION GAP SERPL CALCULATED.3IONS-SCNC: 10 MMOL/L (ref 4–13)
ANISOCYTOSIS BLD QL SMEAR: PRESENT
AST SERPL W P-5'-P-CCNC: 18 U/L (ref 13–39)
BASOPHILS # BLD MANUAL: 0 THOUSAND/UL (ref 0–0.1)
BASOPHILS NFR MAR MANUAL: 0 % (ref 0–1)
BILIRUB SERPL-MCNC: 0.39 MG/DL (ref 0.2–1)
BLD GP AB SCN SERPL QL: NEGATIVE
BUN SERPL-MCNC: 25 MG/DL (ref 5–25)
CALCIUM ALBUM COR SERPL-MCNC: 8.8 MG/DL (ref 8.3–10.1)
CALCIUM SERPL-MCNC: 7.4 MG/DL (ref 8.4–10.2)
CHLORIDE SERPL-SCNC: 105 MMOL/L (ref 96–108)
CO2 SERPL-SCNC: 18 MMOL/L (ref 21–32)
CREAT SERPL-MCNC: 1.39 MG/DL (ref 0.6–1.3)
EOSINOPHIL # BLD MANUAL: 0.13 THOUSAND/UL (ref 0–0.4)
EOSINOPHIL NFR BLD MANUAL: 1 % (ref 0–6)
ERYTHROCYTE [DISTWIDTH] IN BLOOD BY AUTOMATED COUNT: 17.5 % (ref 11.6–15.1)
GFR SERPL CREATININE-BSD FRML MDRD: 36 ML/MIN/1.73SQ M
GLUCOSE SERPL-MCNC: 119 MG/DL (ref 65–140)
HCT VFR BLD AUTO: 14.9 % (ref 34.8–46.1)
HGB BLD-MCNC: 4.4 G/DL (ref 11.5–15.4)
INR PPP: 1.4 (ref 0.84–1.19)
LYMPHOCYTES # BLD AUTO: 1.05 THOUSAND/UL (ref 0.6–4.47)
LYMPHOCYTES # BLD AUTO: 8 % (ref 14–44)
MCH RBC QN AUTO: 30.8 PG (ref 26.8–34.3)
MCHC RBC AUTO-ENTMCNC: 29.5 G/DL (ref 31.4–37.4)
MCV RBC AUTO: 104 FL (ref 82–98)
METAMYELOCYTES NFR BLD MANUAL: 2 % (ref 0–1)
MONOCYTES # BLD AUTO: 0.65 THOUSAND/UL (ref 0–1.22)
MONOCYTES NFR BLD: 5 % (ref 4–12)
NEUTROPHILS # BLD MANUAL: 10.99 THOUSAND/UL (ref 1.85–7.62)
NEUTS BAND NFR BLD MANUAL: 3 % (ref 0–8)
NEUTS SEG NFR BLD AUTO: 81 % (ref 43–75)
PLATELET # BLD AUTO: 635 THOUSANDS/UL (ref 149–390)
PLATELET BLD QL SMEAR: ABNORMAL
PMV BLD AUTO: 9.7 FL (ref 8.9–12.7)
POTASSIUM SERPL-SCNC: 4.4 MMOL/L (ref 3.5–5.3)
PROT SERPL-MCNC: 5.4 G/DL (ref 6.4–8.4)
PROTHROMBIN TIME: 17 SECONDS (ref 11.6–14.5)
RBC # BLD AUTO: 1.43 MILLION/UL (ref 3.81–5.12)
RH BLD: POSITIVE
SODIUM SERPL-SCNC: 133 MMOL/L (ref 135–147)
SPECIMEN EXPIRATION DATE: NORMAL
WBC # BLD AUTO: 13.08 THOUSAND/UL (ref 4.31–10.16)

## 2022-06-11 PROCEDURE — 30233N1 TRANSFUSION OF NONAUTOLOGOUS RED BLOOD CELLS INTO PERIPHERAL VEIN, PERCUTANEOUS APPROACH: ICD-10-PCS | Performed by: INTERNAL MEDICINE

## 2022-06-11 PROCEDURE — 86850 RBC ANTIBODY SCREEN: CPT | Performed by: EMERGENCY MEDICINE

## 2022-06-11 PROCEDURE — 93005 ELECTROCARDIOGRAM TRACING: CPT

## 2022-06-11 PROCEDURE — P9040 RBC LEUKOREDUCED IRRADIATED: HCPCS

## 2022-06-11 PROCEDURE — 86923 COMPATIBILITY TEST ELECTRIC: CPT

## 2022-06-11 PROCEDURE — 36430 TRANSFUSION BLD/BLD COMPNT: CPT

## 2022-06-11 PROCEDURE — 86900 BLOOD TYPING SEROLOGIC ABO: CPT | Performed by: EMERGENCY MEDICINE

## 2022-06-11 PROCEDURE — 86901 BLOOD TYPING SEROLOGIC RH(D): CPT | Performed by: EMERGENCY MEDICINE

## 2022-06-11 PROCEDURE — C9113 INJ PANTOPRAZOLE SODIUM, VIA: HCPCS | Performed by: EMERGENCY MEDICINE

## 2022-06-11 PROCEDURE — 99291 CRITICAL CARE FIRST HOUR: CPT | Performed by: EMERGENCY MEDICINE

## 2022-06-11 PROCEDURE — 99292 CRITICAL CARE ADDL 30 MIN: CPT | Performed by: EMERGENCY MEDICINE

## 2022-06-11 PROCEDURE — 86920 COMPATIBILITY TEST SPIN: CPT

## 2022-06-11 PROCEDURE — 99285 EMERGENCY DEPT VISIT HI MDM: CPT

## 2022-06-11 PROCEDURE — 96366 THER/PROPH/DIAG IV INF ADDON: CPT

## 2022-06-11 PROCEDURE — 96374 THER/PROPH/DIAG INJ IV PUSH: CPT

## 2022-06-11 PROCEDURE — 85027 COMPLETE CBC AUTOMATED: CPT | Performed by: EMERGENCY MEDICINE

## 2022-06-11 PROCEDURE — 80053 COMPREHEN METABOLIC PANEL: CPT | Performed by: EMERGENCY MEDICINE

## 2022-06-11 PROCEDURE — 85610 PROTHROMBIN TIME: CPT | Performed by: EMERGENCY MEDICINE

## 2022-06-11 PROCEDURE — 36415 COLL VENOUS BLD VENIPUNCTURE: CPT | Performed by: EMERGENCY MEDICINE

## 2022-06-11 PROCEDURE — 96365 THER/PROPH/DIAG IV INF INIT: CPT

## 2022-06-11 PROCEDURE — 99291 CRITICAL CARE FIRST HOUR: CPT | Performed by: NURSE PRACTITIONER

## 2022-06-11 PROCEDURE — 85007 BL SMEAR W/DIFF WBC COUNT: CPT | Performed by: EMERGENCY MEDICINE

## 2022-06-11 RX ORDER — SODIUM CHLORIDE, SODIUM GLUCONATE, SODIUM ACETATE, POTASSIUM CHLORIDE, MAGNESIUM CHLORIDE, SODIUM PHOSPHATE, DIBASIC, AND POTASSIUM PHOSPHATE .53; .5; .37; .037; .03; .012; .00082 G/100ML; G/100ML; G/100ML; G/100ML; G/100ML; G/100ML; G/100ML
75 INJECTION, SOLUTION INTRAVENOUS CONTINUOUS
Status: DISCONTINUED | OUTPATIENT
Start: 2022-06-11 | End: 2022-06-13

## 2022-06-11 RX ORDER — ONDANSETRON 2 MG/ML
4 INJECTION INTRAMUSCULAR; INTRAVENOUS ONCE
Status: COMPLETED | OUTPATIENT
Start: 2022-06-11 | End: 2022-06-11

## 2022-06-11 RX ADMIN — ONDANSETRON 4 MG: 2 INJECTION INTRAMUSCULAR; INTRAVENOUS at 17:35

## 2022-06-11 RX ADMIN — SODIUM CHLORIDE, SODIUM GLUCONATE, SODIUM ACETATE, POTASSIUM CHLORIDE, MAGNESIUM CHLORIDE, SODIUM PHOSPHATE, DIBASIC, AND POTASSIUM PHOSPHATE 75 ML/HR: .53; .5; .37; .037; .03; .012; .00082 INJECTION, SOLUTION INTRAVENOUS at 20:43

## 2022-06-11 RX ADMIN — SODIUM CHLORIDE 80 MG: 9 INJECTION, SOLUTION INTRAVENOUS at 17:35

## 2022-06-11 RX ADMIN — SODIUM CHLORIDE 8 MG/HR: 9 INJECTION, SOLUTION INTRAVENOUS at 18:02

## 2022-06-11 NOTE — ED PROVIDER NOTES
History  Chief Complaint   Patient presents with    Hemoptysis     +dizziness     HPI     70-year-old female brought to the emergency department via EMS for evaluation of hematemesis  History of gastric ulcer, states nausea, vomiting, roughly 7-8 oz of hematemesis, bright red blood today  Denies any pain  Denies any syncope  Denies any falls or trauma  Denies any blood thinning medications  Reports she is not sure if she has been taking her antacid medications as recommended  Denies any known bright blood per rectum or melena  Prior to Admission Medications   Prescriptions Last Dose Informant Patient Reported? Taking?    clonazePAM (KlonoPIN) 1 mg tablet 6/10/2022 at Unknown time Self Yes Yes   Sig: TK 3 TS PO Q NIGHT   levothyroxine 112 mcg tablet 6/11/2022 at Unknown time Self Yes Yes   Sig: TK 1 T PO QD   pantoprazole (PROTONIX) 40 mg tablet 6/11/2022 at Unknown time  No Yes   Sig: Take 1 tablet (40 mg total) by mouth 2 (two) times a day before meals   sucralfate (CARAFATE) 1 g tablet 6/11/2022 at Unknown time  No Yes   Sig: Take 1 tablet (1 g total) by mouth 4 (four) times a day (before meals and at bedtime)   traZODone (DESYREL) 50 mg tablet 6/10/2022 at Unknown time Self Yes Yes   Sig: Take 150 mg by mouth daily at bedtime      Facility-Administered Medications: None       Past Medical History:   Diagnosis Date    Anemia     Anxiety     Bipolar disorder (HCC)     Colon polyp     Disease of thyroid gland     Essential hypertension     Essential tremor     Fibromyalgia, primary     GERD (gastroesophageal reflux disease)     Inflammatory polyarthropathy (HCC)     Mammogram abnormal        Past Surgical History:   Procedure Laterality Date    BREAST BIOPSY Right 2015    benign    CARPAL TUNNEL RELEASE Bilateral     COLONOSCOPY      EGD AND COLONOSCOPY  01/01/2014    GASTRIC BYPASS  07/01/2012    MAMMO NEEDLE LOCALIZATION RIGHT (ALL INC) Right 2/6/2012    MAMMO NEEDLE LOCALIZATION RIGHT (ALL INC) Right 2012    ULNAR NERVE TRANSPOSITION Right        Family History   Problem Relation Age of Onset    No Known Problems Mother     No Known Problems Father     No Known Problems Sister     No Known Problems Maternal Grandmother     No Known Problems Maternal Grandfather     No Known Problems Paternal Grandmother     No Known Problems Paternal Grandfather     No Known Problems Sister     No Known Problems Sister     Stomach cancer Maternal Aunt     No Known Problems Maternal Aunt     No Known Problems Maternal Aunt     No Known Problems Maternal Aunt     No Known Problems Paternal Aunt     Leukemia Other      I have reviewed and agree with the history as documented  E-Cigarette/Vaping    E-Cigarette Use Never User      E-Cigarette/Vaping Substances    Nicotine No     THC No     CBD No     Flavoring No      Social History     Tobacco Use    Smoking status: Former Smoker     Quit date:      Years since quittin 4    Smokeless tobacco: Never Used   Vaping Use    Vaping Use: Never used   Substance Use Topics    Alcohol use: Yes     Alcohol/week: 4 0 standard drinks     Types: 4 Glasses of wine per week     Comment: rare    Drug use: Never       Review of Systems   Constitutional: Positive for fatigue  Gastrointestinal: Positive for nausea and vomiting  Negative for abdominal pain, blood in stool and constipation  All other systems reviewed and are negative  Physical Exam  Physical Exam  Vitals and nursing note reviewed  Constitutional:       General: She is not in acute distress  Appearance: She is well-developed  Comments: Thin, frail elderly female conjunctival pallor   HENT:      Head: Normocephalic and atraumatic  Eyes:      Pupils: Pupils are equal, round, and reactive to light  Comments: Pale conjunctiva   Cardiovascular:      Rate and Rhythm: Regular rhythm  Tachycardia present  Heart sounds: Normal heart sounds   No murmur heard   Pulmonary:      Effort: Pulmonary effort is normal  No respiratory distress  Breath sounds: Normal breath sounds  No wheezing or rales  Abdominal:      General: Bowel sounds are normal  There is no distension  Palpations: Abdomen is soft  Tenderness: There is no abdominal tenderness  There is no guarding or rebound  Musculoskeletal:         General: No deformity  Normal range of motion  Cervical back: Normal range of motion and neck supple  Lymphadenopathy:      Cervical: No cervical adenopathy  Skin:     Capillary Refill: Capillary refill takes less than 2 seconds  Findings: No erythema or rash  Neurological:      Mental Status: She is alert and oriented to person, place, and time  Cranial Nerves: No cranial nerve deficit  Motor: No abnormal muscle tone        Coordination: Coordination normal    Psychiatric:         Behavior: Behavior normal          Vital Signs  ED Triage Vitals   Temperature Pulse Respirations Blood Pressure SpO2   06/11/22 1706 06/11/22 1700 06/11/22 1700 06/11/22 1700 06/11/22 1700   98 1 °F (36 7 °C) 92 22 90/50 100 %      Temp Source Heart Rate Source Patient Position - Orthostatic VS BP Location FiO2 (%)   06/11/22 1930 06/11/22 1700 06/11/22 1700 06/11/22 1700 --   Oral Monitor Lying Right arm       Pain Score       --                  Vitals:    06/11/22 1800 06/11/22 1825 06/11/22 1900 06/11/22 1930   BP: 92/52 (!) 87/50 (!) 86/48 96/51   Pulse: 84 80 78 80   Patient Position - Orthostatic VS: Lying  Lying Lying         Visual Acuity  Visual Acuity    Flowsheet Row Most Recent Value   L Pupil Size (mm) 3   R Pupil Size (mm) 3          ED Medications  Medications   pantoprazole (PROTONIX) 80 mg in sodium chloride 0 9 % 100 mL infusion (8 mg/hr Intravenous New Bag 6/11/22 1802)   pantoprazole (PROTONIX) 80 mg in sodium chloride 0 9 % 100 mL IVPB (0 mg Intravenous Stopped 6/11/22 1758)   ondansetron (ZOFRAN) injection 4 mg (4 mg Intravenous Given 6/11/22 1735)       Diagnostic Studies  Results Reviewed     Procedure Component Value Units Date/Time    CBC and differential [[id number]]  (Abnormal) Collected: 06/11/22 1704    Lab Status: Final result Specimen: Blood from Arm, Right Updated: 06/11/22 1748     WBC 13 08 Thousand/uL      RBC 1 43 Million/uL      Hemoglobin 4 4 g/dL      Hematocrit 14 9 %       fL      MCH 30 8 pg      MCHC 29 5 g/dL      RDW 17 5 %      MPV 9 7 fL      Platelets 213 Thousands/uL     Manual Differential(PHLEBS Do Not Order) [[id number]]  (Abnormal) Collected: 06/11/22 1704    Lab Status: Final result Specimen: Blood from Arm, Right Updated: 06/11/22 1748     Segmented % 81 %      Bands % 3 %      Lymphocytes % 8 %      Monocytes % 5 %      Eosinophils, % 1 %      Basophils % 0 %      Metamyelocytes% 2 %      Absolute Neutrophils 10 99 Thousand/uL      Lymphocytes Absolute 1 05 Thousand/uL      Monocytes Absolute 0 65 Thousand/uL      Eosinophils Absolute 0 13 Thousand/uL      Basophils Absolute 0 00 Thousand/uL      Total Counted --     Anisocytosis Present     Platelet Estimate Increased    Comprehensive metabolic panel [[id number]]  (Abnormal) Collected: 06/11/22 1704    Lab Status: Final result Specimen: Blood from Arm, Right Updated: 06/11/22 1734     Sodium 133 mmol/L      Potassium 4 4 mmol/L      Chloride 105 mmol/L      CO2 18 mmol/L      ANION GAP 10 mmol/L      BUN 25 mg/dL      Creatinine 1 39 mg/dL      Glucose 119 mg/dL      Calcium 7 4 mg/dL      Corrected Calcium 8 8 mg/dL      AST 18 U/L      ALT 8 U/L      Alkaline Phosphatase 40 U/L      Total Protein 5 4 g/dL      Albumin 2 3 g/dL      Total Bilirubin 0 39 mg/dL      eGFR 36 ml/min/1 73sq m     Narrative:      Shawanda guidelines for Chronic Kidney Disease (CKD):     Stage 1 with normal or high GFR (GFR > 90 mL/min/1 73 square meters)    Stage 2 Mild CKD (GFR = 60-89 mL/min/1 73 square meters)    Stage 3A Moderate CKD (GFR = 45-59 mL/min/1 73 square meters)    Stage 3B Moderate CKD (GFR = 30-44 mL/min/1 73 square meters)    Stage 4 Severe CKD (GFR = 15-29 mL/min/1 73 square meters)    Stage 5 End Stage CKD (GFR <15 mL/min/1 73 square meters)  Note: GFR calculation is accurate only with a steady state creatinine    Protime-INR [[id number]]  (Abnormal) Collected: 06/11/22 1704    Lab Status: Final result Specimen: Blood from Arm, Right Updated: 06/11/22 1731     Protime 17 0 seconds      INR 1 40                 No orders to display              Procedures  ECG 12 Lead Documentation Only    Date/Time: 6/11/2022 7:42 PM  Performed by: Gabe Hale MD  Authorized by: Gabe Hale MD     Indications / Diagnosis:  Shortness of breath  ECG reviewed by me, the ED Provider: yes    Patient location:  ED  Interpretation:     Interpretation: non-specific    Rate:     ECG rate:  90    ECG rate assessment: normal    Rhythm:     Rhythm: sinus rhythm    Ectopy:     Ectopy: none    QRS:     QRS axis:  Normal  Conduction:     Conduction: normal    ST segments:     ST segments:  Normal  T waves:     T waves: normal    CriticalCare Time  Performed by: Gabe Hale MD  Authorized by: Gabe Hale MD     Critical care provider statement:     Critical care time (minutes):  75    Critical care time was exclusive of:  Separately billable procedures and treating other patients and teaching time    Critical care was necessary to treat or prevent imminent or life-threatening deterioration of the following conditions:  Circulatory failure    Critical care was time spent personally by me on the following activities:  Discussions with consultants, examination of patient, ordering and review of laboratory studies and ordering and review of radiographic studies  Comments:      Significant symptomatic anemia requiring packed red blood cell transfusion, discussion with GI, discussed with Critical Care, admission to the hospital              ED Course  ED Course as of 06/11/22 1944   Sat Jun 11, 202211, 2022 1708 Spoke with GI, Dr Stacie Floyd regarding patient's presenting signs and symptoms, initial hypotension, recent EGD findings  He agreed with treatment Protonix bolus, infusion to help with suspected upper GI bleed, if patient continues to have hematemesis or condition worsens, he is available to present to the hospital for EGD  SBIRT 20yo+    Flowsheet Row Most Recent Value   SBIRT (25 yo +)    In order to provide better care to our patients, we are screening all of our patients for alcohol and drug use  Would it be okay to ask you these screening questions? Yes Filed at: 06/11/2022 1729   Initial Alcohol Screen: US AUDIT-C     1  How often do you have a drink containing alcohol? 0 Filed at: 06/11/2022 1729   2  How many drinks containing alcohol do you have on a typical day you are drinking? 0 Filed at: 06/11/2022 1729   3a  Male UNDER 65: How often do you have five or more drinks on one occasion? 0 Filed at: 06/11/2022 1729   3b  FEMALE Any Age, or MALE 65+: How often do you have 4 or more drinks on one occassion? 0 Filed at: 06/11/2022 1729   Audit-C Score 0 Filed at: 06/11/2022 1729   BRENDA: How many times in the past year have you    Used an illegal drug or used a prescription medication for non-medical reasons? Never Filed at: 06/11/2022 1729                    MDM  Number of Diagnoses or Management Options  Anemia: new and requires workup  Hematemesis: new and requires workup  Hypotension: new and requires workup  Diagnosis management comments: Symptomatic anemia, upper GI bleed, IV Protonix given to patient, discussed with Dr Stacie Floyd, GI who is aware patient, agrees with current treatment, recommends resuscitation, will perform EGD if patient's condition worsens  Patient had episode of bright red blood per rectum    Symptomatic anemia, packed red blood transfusion initiated, patient tolerated well, patient seen and evaluated by critical care team in emergency department, admitted to step-down 1 level of care  Amount and/or Complexity of Data Reviewed  Clinical lab tests: ordered and reviewed  Tests in the medicine section of CPT®: ordered and reviewed  Discuss the patient with other providers: yes    Risk of Complications, Morbidity, and/or Mortality  Presenting problems: high  Diagnostic procedures: high  Management options: high    Patient Progress  Patient progress: stable      Disposition  Final diagnoses:   Hematemesis   Anemia   Hypotension     Time reflects when diagnosis was documented in both MDM as applicable and the Disposition within this note     Time User Action Codes Description Comment    6/11/2022  7:40 PM Maira Comstock Add [K92 0] Hematemesis     6/11/2022  7:40 PM Maira Comstock Add [D64 9] Anemia     6/11/2022  7:40 PM Maira Comstock Add [I95 9] Hypotension       ED Disposition     ED Disposition   Admit    Condition   Stable    Date/Time   Sat Jun 11, 2022  7:40 PM    Comment   Case was discussed with critical care AP and the patient's admission status was agreed to be Admission Status: inpatient status to the service of Dr Vidal De Oliveira   Follow-up Information    None         Patient's Medications   Discharge Prescriptions    No medications on file       No discharge procedures on file      PDMP Review       Value Time User    PDMP Reviewed  Yes 12/14/2021  2:42 PM Gallito Chun MD          ED Provider  Electronically Signed by           Geoff Rubalcava MD  06/11/22 194

## 2022-06-12 ENCOUNTER — APPOINTMENT (INPATIENT)
Dept: PERIOP | Facility: HOSPITAL | Age: 77
DRG: 377 | End: 2022-06-12
Payer: COMMERCIAL

## 2022-06-12 ENCOUNTER — ANESTHESIA EVENT (INPATIENT)
Dept: PERIOP | Facility: HOSPITAL | Age: 77
DRG: 377 | End: 2022-06-12
Payer: COMMERCIAL

## 2022-06-12 ENCOUNTER — ANESTHESIA (INPATIENT)
Dept: PERIOP | Facility: HOSPITAL | Age: 77
DRG: 377 | End: 2022-06-12
Payer: COMMERCIAL

## 2022-06-12 LAB
ABO GROUP BLD: NORMAL
ALBUMIN SERPL BCP-MCNC: 1.9 G/DL (ref 3.5–5)
ALP SERPL-CCNC: 30 U/L (ref 34–104)
ALT SERPL W P-5'-P-CCNC: 7 U/L (ref 7–52)
ANION GAP SERPL CALCULATED.3IONS-SCNC: 14 MMOL/L (ref 4–13)
AST SERPL W P-5'-P-CCNC: 12 U/L (ref 13–39)
BASOPHILS # BLD AUTO: 0.05 THOUSANDS/ΜL (ref 0–0.1)
BASOPHILS NFR BLD AUTO: 1 % (ref 0–1)
BILIRUB SERPL-MCNC: 1.09 MG/DL (ref 0.2–1)
BLD GP AB SCN SERPL QL: NEGATIVE
BUN SERPL-MCNC: 27 MG/DL (ref 5–25)
CALCIUM ALBUM COR SERPL-MCNC: 7.7 MG/DL (ref 8.3–10.1)
CALCIUM SERPL-MCNC: 6 MG/DL (ref 8.4–10.2)
CHLORIDE SERPL-SCNC: 106 MMOL/L (ref 96–108)
CO2 SERPL-SCNC: 16 MMOL/L (ref 21–32)
CREAT SERPL-MCNC: 0.96 MG/DL (ref 0.6–1.3)
EOSINOPHIL # BLD AUTO: 0.56 THOUSAND/ΜL (ref 0–0.61)
EOSINOPHIL NFR BLD AUTO: 6 % (ref 0–6)
ERYTHROCYTE [DISTWIDTH] IN BLOOD BY AUTOMATED COUNT: 21.1 % (ref 11.6–15.1)
GFR SERPL CREATININE-BSD FRML MDRD: 56 ML/MIN/1.73SQ M
GLUCOSE SERPL-MCNC: 101 MG/DL (ref 65–140)
GLUCOSE SERPL-MCNC: 69 MG/DL (ref 65–140)
HCT VFR BLD AUTO: 17.2 % (ref 34.8–46.1)
HCT VFR BLD AUTO: 21.7 % (ref 34.8–46.1)
HCT VFR BLD AUTO: 29.5 % (ref 34.8–46.1)
HCT VFR BLD AUTO: 29.6 % (ref 34.8–46.1)
HGB BLD-MCNC: 10.2 G/DL (ref 11.5–15.4)
HGB BLD-MCNC: 10.2 G/DL (ref 11.5–15.4)
HGB BLD-MCNC: 5.9 G/DL (ref 11.5–15.4)
HGB BLD-MCNC: 7.3 G/DL (ref 11.5–15.4)
IMM GRANULOCYTES # BLD AUTO: 0.22 THOUSAND/UL (ref 0–0.2)
IMM GRANULOCYTES NFR BLD AUTO: 2 % (ref 0–2)
INR PPP: 1.12 (ref 0.84–1.19)
INR PPP: 1.48 (ref 0.84–1.19)
LACTATE SERPL-SCNC: 0.5 MMOL/L (ref 0.5–2)
LYMPHOCYTES # BLD AUTO: 2.6 THOUSANDS/ΜL (ref 0.6–4.47)
LYMPHOCYTES NFR BLD AUTO: 25 % (ref 14–44)
MAGNESIUM SERPL-MCNC: 2.3 MG/DL (ref 1.9–2.7)
MCH RBC QN AUTO: 29.8 PG (ref 26.8–34.3)
MCHC RBC AUTO-ENTMCNC: 34.3 G/DL (ref 31.4–37.4)
MCV RBC AUTO: 86 FL (ref 82–98)
MONOCYTES # BLD AUTO: 0.93 THOUSAND/ΜL (ref 0.17–1.22)
MONOCYTES NFR BLD AUTO: 9 % (ref 4–12)
NEUTROPHILS # BLD AUTO: 5.88 THOUSANDS/ΜL (ref 1.85–7.62)
NEUTS SEG NFR BLD AUTO: 57 % (ref 43–75)
NRBC BLD AUTO-RTO: 0 /100 WBCS
PHOSPHATE SERPL-MCNC: 2.7 MG/DL (ref 2.3–4.1)
PLATELET # BLD AUTO: 460 THOUSANDS/UL (ref 149–390)
PMV BLD AUTO: 9.5 FL (ref 8.9–12.7)
POTASSIUM SERPL-SCNC: 4 MMOL/L (ref 3.5–5.3)
PROT SERPL-MCNC: 4.3 G/DL (ref 6.4–8.4)
PROTHROMBIN TIME: 14.4 SECONDS (ref 11.6–14.5)
PROTHROMBIN TIME: 17.8 SECONDS (ref 11.6–14.5)
RBC # BLD AUTO: 1.98 MILLION/UL (ref 3.81–5.12)
RH BLD: POSITIVE
SODIUM SERPL-SCNC: 136 MMOL/L (ref 135–147)
SPECIMEN EXPIRATION DATE: NORMAL
WBC # BLD AUTO: 10.24 THOUSAND/UL (ref 4.31–10.16)

## 2022-06-12 PROCEDURE — 86901 BLOOD TYPING SEROLOGIC RH(D): CPT | Performed by: NURSE PRACTITIONER

## 2022-06-12 PROCEDURE — 0W3P8ZZ CONTROL BLEEDING IN GASTROINTESTINAL TRACT, VIA NATURAL OR ARTIFICIAL OPENING ENDOSCOPIC: ICD-10-PCS | Performed by: INTERNAL MEDICINE

## 2022-06-12 PROCEDURE — 99222 1ST HOSP IP/OBS MODERATE 55: CPT | Performed by: INTERNAL MEDICINE

## 2022-06-12 PROCEDURE — 86923 COMPATIBILITY TEST ELECTRIC: CPT

## 2022-06-12 PROCEDURE — 85018 HEMOGLOBIN: CPT | Performed by: NURSE PRACTITIONER

## 2022-06-12 PROCEDURE — 85025 COMPLETE CBC W/AUTO DIFF WBC: CPT | Performed by: NURSE PRACTITIONER

## 2022-06-12 PROCEDURE — 99232 SBSQ HOSP IP/OBS MODERATE 35: CPT | Performed by: INTERNAL MEDICINE

## 2022-06-12 PROCEDURE — 85014 HEMATOCRIT: CPT | Performed by: NURSE PRACTITIONER

## 2022-06-12 PROCEDURE — 83735 ASSAY OF MAGNESIUM: CPT | Performed by: NURSE PRACTITIONER

## 2022-06-12 PROCEDURE — C9113 INJ PANTOPRAZOLE SODIUM, VIA: HCPCS | Performed by: NURSE PRACTITIONER

## 2022-06-12 PROCEDURE — 30233N1 TRANSFUSION OF NONAUTOLOGOUS RED BLOOD CELLS INTO PERIPHERAL VEIN, PERCUTANEOUS APPROACH: ICD-10-PCS | Performed by: INTERNAL MEDICINE

## 2022-06-12 PROCEDURE — 43255 EGD CONTROL BLEEDING ANY: CPT | Performed by: INTERNAL MEDICINE

## 2022-06-12 PROCEDURE — 85610 PROTHROMBIN TIME: CPT | Performed by: NURSE PRACTITIONER

## 2022-06-12 PROCEDURE — 80053 COMPREHEN METABOLIC PANEL: CPT | Performed by: NURSE PRACTITIONER

## 2022-06-12 PROCEDURE — P9016 RBC LEUKOCYTES REDUCED: HCPCS

## 2022-06-12 PROCEDURE — 86900 BLOOD TYPING SEROLOGIC ABO: CPT | Performed by: NURSE PRACTITIONER

## 2022-06-12 PROCEDURE — 83605 ASSAY OF LACTIC ACID: CPT | Performed by: NURSE PRACTITIONER

## 2022-06-12 PROCEDURE — 84100 ASSAY OF PHOSPHORUS: CPT | Performed by: NURSE PRACTITIONER

## 2022-06-12 PROCEDURE — P9040 RBC LEUKOREDUCED IRRADIATED: HCPCS

## 2022-06-12 PROCEDURE — 86850 RBC ANTIBODY SCREEN: CPT | Performed by: NURSE PRACTITIONER

## 2022-06-12 PROCEDURE — 82948 REAGENT STRIP/BLOOD GLUCOSE: CPT

## 2022-06-12 RX ORDER — LEVOTHYROXINE SODIUM 112 UG/1
112 TABLET ORAL
Status: DISCONTINUED | OUTPATIENT
Start: 2022-06-13 | End: 2022-06-16

## 2022-06-12 RX ORDER — DEXTROSE MONOHYDRATE 25 G/50ML
25 INJECTION, SOLUTION INTRAVENOUS ONCE
Status: COMPLETED | OUTPATIENT
Start: 2022-06-12 | End: 2022-06-12

## 2022-06-12 RX ORDER — SUCCINYLCHOLINE/SOD CL,ISO/PF 100 MG/5ML
SYRINGE (ML) INTRAVENOUS AS NEEDED
Status: DISCONTINUED | OUTPATIENT
Start: 2022-06-12 | End: 2022-06-12

## 2022-06-12 RX ORDER — FENTANYL CITRATE 50 UG/ML
INJECTION, SOLUTION INTRAMUSCULAR; INTRAVENOUS AS NEEDED
Status: DISCONTINUED | OUTPATIENT
Start: 2022-06-12 | End: 2022-06-12

## 2022-06-12 RX ORDER — PROPOFOL 10 MG/ML
INJECTION, EMULSION INTRAVENOUS AS NEEDED
Status: DISCONTINUED | OUTPATIENT
Start: 2022-06-12 | End: 2022-06-12

## 2022-06-12 RX ORDER — EPINEPHRINE 0.1 MG/ML
SYRINGE (ML) INJECTION CODE/TRAUMA/SEDATION MEDICATION
Status: COMPLETED | OUTPATIENT
Start: 2022-06-12 | End: 2022-06-12

## 2022-06-12 RX ORDER — METOCLOPRAMIDE HYDROCHLORIDE 5 MG/ML
10 INJECTION INTRAMUSCULAR; INTRAVENOUS ONCE
Status: COMPLETED | OUTPATIENT
Start: 2022-06-12 | End: 2022-06-12

## 2022-06-12 RX ORDER — ONDANSETRON 2 MG/ML
4 INJECTION INTRAMUSCULAR; INTRAVENOUS EVERY 6 HOURS PRN
Status: DISCONTINUED | OUTPATIENT
Start: 2022-06-12 | End: 2022-06-16 | Stop reason: HOSPADM

## 2022-06-12 RX ORDER — SODIUM CHLORIDE 9 MG/ML
INJECTION, SOLUTION INTRAVENOUS CONTINUOUS PRN
Status: DISCONTINUED | OUTPATIENT
Start: 2022-06-12 | End: 2022-06-12

## 2022-06-12 RX ORDER — TRAZODONE HYDROCHLORIDE 50 MG/1
50 TABLET ORAL
Status: DISCONTINUED | OUTPATIENT
Start: 2022-06-12 | End: 2022-06-16

## 2022-06-12 RX ORDER — LIDOCAINE HYDROCHLORIDE 10 MG/ML
INJECTION, SOLUTION EPIDURAL; INFILTRATION; INTRACAUDAL; PERINEURAL AS NEEDED
Status: DISCONTINUED | OUTPATIENT
Start: 2022-06-12 | End: 2022-06-12

## 2022-06-12 RX ORDER — CALCIUM GLUCONATE 20 MG/ML
2 INJECTION, SOLUTION INTRAVENOUS ONCE
Status: COMPLETED | OUTPATIENT
Start: 2022-06-12 | End: 2022-06-12

## 2022-06-12 RX ADMIN — DEXTROSE MONOHYDRATE 25 G: 25 INJECTION, SOLUTION INTRAVENOUS at 07:05

## 2022-06-12 RX ADMIN — METOCLOPRAMIDE HYDROCHLORIDE 10 MG: 5 INJECTION INTRAMUSCULAR; INTRAVENOUS at 09:16

## 2022-06-12 RX ADMIN — FENTANYL CITRATE 25 MCG: 50 INJECTION, SOLUTION INTRAMUSCULAR; INTRAVENOUS at 10:43

## 2022-06-12 RX ADMIN — ONDANSETRON 4 MG: 2 INJECTION INTRAMUSCULAR; INTRAVENOUS at 05:07

## 2022-06-12 RX ADMIN — TRAZODONE HYDROCHLORIDE 50 MG: 50 TABLET ORAL at 21:34

## 2022-06-12 RX ADMIN — SODIUM CHLORIDE 8 MG/HR: 9 INJECTION, SOLUTION INTRAVENOUS at 01:03

## 2022-06-12 RX ADMIN — CLONAZEPAM 1.5 MG: 1 TABLET ORAL at 21:34

## 2022-06-12 RX ADMIN — SODIUM CHLORIDE 8 MG/HR: 9 INJECTION, SOLUTION INTRAVENOUS at 13:14

## 2022-06-12 RX ADMIN — SODIUM CHLORIDE 8 MG/HR: 9 INJECTION, SOLUTION INTRAVENOUS at 23:00

## 2022-06-12 RX ADMIN — LIDOCAINE HYDROCHLORIDE 50 MG: 10 INJECTION, SOLUTION EPIDURAL; INFILTRATION; INTRACAUDAL at 10:43

## 2022-06-12 RX ADMIN — EPINEPHRINE 0.1 MG: 0.1 INJECTION, SOLUTION ENDOTRACHEAL; INTRACARDIAC; INTRAVENOUS at 11:22

## 2022-06-12 RX ADMIN — SODIUM CHLORIDE: 0.9 INJECTION, SOLUTION INTRAVENOUS at 10:35

## 2022-06-12 RX ADMIN — FENTANYL CITRATE 25 MCG: 50 INJECTION, SOLUTION INTRAMUSCULAR; INTRAVENOUS at 11:09

## 2022-06-12 RX ADMIN — Medication 60 MG: at 10:43

## 2022-06-12 RX ADMIN — CALCIUM GLUCONATE 2 G: 20 INJECTION, SOLUTION INTRAVENOUS at 09:00

## 2022-06-12 RX ADMIN — PROPOFOL 100 MG: 10 INJECTION, EMULSION INTRAVENOUS at 10:43

## 2022-06-12 RX ADMIN — SODIUM CHLORIDE, SODIUM GLUCONATE, SODIUM ACETATE, POTASSIUM CHLORIDE, MAGNESIUM CHLORIDE, SODIUM PHOSPHATE, DIBASIC, AND POTASSIUM PHOSPHATE 75 ML/HR: .53; .5; .37; .037; .03; .012; .00082 INJECTION, SOLUTION INTRAVENOUS at 09:00

## 2022-06-12 NOTE — PROGRESS NOTES
Daily Progress Note - Critical Care   Christiano Montemayor 64 y o  female MRN: [id number]  Unit/Bed#: ICU 03 Encounter: [id number]        ----------------------------------------------------------------------------------------  HPI/  64 y o  female (pmhx Savage-en-Y gastric bypass, anemia, anxiety, hypothyroidism, osteoarthritis) presented [facility] via EMS with hematemesis, BRBPR, dizziness/lightheadedness that started yesterday  In ED, pt's Hgb was 4 4, platelets 506, INR 1 4, BP 80's/50's, HR 70-80's  After first unit of pRBC's pt's BP increased to 96/51, and given 2nd unit of pRBC's  Pt was given zofran for nausea, Protonix 8 mg/hr at 10 mL/hr (rate) continuous, and Plasmalyte 75 mL/hr M-IVF  Subsequently, the patient was admitted to [facility], kept NPO and GI has been consulted for which the patient is tentatively scheduled for AM EGD  Pt was previously admitted to [facility] from 05/24-05/28  At that time, she presented to [facility] ED with generalized weakness, exertional dyspnea/DELUNA, and dizziness  She reported her symptoms were occurring for over 3 months, and also reported falling in her bathtub from a sitting position, and hit her head, neck and back  The pt was ultimately admitted for symptomatic anemia for which she was transfused 1 pRBC, and GI was consulted  The patient had EGD and colonoscopy which showed large gastric ulcer (near the anastomotic site and distal gastric remnant)  Pt was discharged in stable condition and was supposed to have close follow up with GI       ---------------------------------------------------------------------------------------  24hr events:   (06/12): Pt noted to have 2 bowel movements overnight for which crimson red blood - approximately 2 large hand full amount   Pt systolic BP dropped into 32'E     ---------------------------------------------------------------------------------------  SUBJECTIVE    This AM pt reports being thirsty but understands that she's being kept NPO by GI for possible/inevitable EGD (also colonoscopy?)  Pt also reports epigastric pain that started overnight  Denies any radiation of the pain, says it's constantly there, describes it as gnawing, and says she's unsure if she had this pain before  Pt reports her appetite at home has decreased over the years and says it's part of getting older  She also reports that her SOB started a few months ago but still doesn't remember when it exactly started  Says the SOB gets worse when she has to walk upstairs, and says when she is at home/ in her apt that she doesn't exhibit any SOB  She denies any other symptoms this morning  Review of Systems   Constitutional: Positive for activity change and appetite change  Negative for chills, diaphoresis, fatigue, fever and unexpected weight change  HENT: Negative  Eyes: Negative  Respiratory: Positive for shortness of breath  Negative for cough, chest tightness and wheezing  Cardiovascular: Negative for chest pain, palpitations and leg swelling  Gastrointestinal: Positive for abdominal pain, blood in stool and vomiting  Negative for abdominal distention, anal bleeding, constipation, diarrhea, nausea and rectal pain  Genitourinary: Negative for decreased urine volume, difficulty urinating, dysuria, flank pain, frequency, pelvic pain and urgency  Skin: Negative for rash  Neurological: Positive for dizziness and light-headedness  Negative for syncope, speech difficulty, weakness and headaches      ---------------------------------------------------------------------------------------  Assessment and Plan:    Acute Blood Loss Anemia  POA Acute blood loss anemia with an Hgb of 4 4 in ED given 2 units pRBC's  Recent admission (05/24) for symptomatic anemia and discharged with Hgb of 8 3     · Hx of Gastric ulcers, EGD from last admission showed large ulcer (near the anastomotic site and distal gastric remnant) that was not bleeding  · Currently not on any AC/AP at home  · In ED, given 2 units PRBC and started on IV Protonix + IVF (Isolyte)  · BP initially 80s/50s, HR 70s-80s, following first unit BP 96/51  · Not on AV carina blockers  · EKG Sinus rhythm  · GI consulted, appreciate recommendations -- plan to volume resuscitate / transfuse overnight and possible EGD in am  · Continue to trend H/H q4h  · Transfuse for Hgb < 7 or active bleeding  · Continue Protonix gtt  · NPO for EGD      BRBPR (bright red blood per rectum)  · POA: noted in ER  · Denies previous bloody or tarry stools  States that she has not had BM in two weeks  Denies abdominal pain, distension  · Patient with previous known gastric ulcers  · As above      Hematemesis  · Patient reports "fresh blood" emesis at home x1 today  · See above      Gastric ulcer  · Recent admission for symptomatic anemia  EGD during that admission with noted ulcer, no active bleeding  Patient was to have follow up EGD and to continue PPI and Carafate  · EGD 5/26: Single large, deep, irregular, benign-appearing ulcer in the gastric pouch and gastrojejunal anastomosis; performed cold forceps biopsy  Large ulcer noted at the anastomosis/distal gastric pouch  Staples noted in this area  No active bleeding from the ulceration  The ulceration is large measuring at least 2 5 x 3 cm  · EGD 7/2021: Signs of previous gastric bypass surgery in the stomach  History of [place] en y gastric bypass  Mild erythematous mucosa in the body of the stomach and antrum; performed cold forceps biopsy  Biopsies obtained to rule out H  Pylori  The jejunum appeared normal  · EGD 9/2019: Signs of previous gastric bypass surgery in the stomach  History of [place] en y gastric bypass  Multiple cratered, linear, benign-appearing ulcers in the body of the stomach and anastomosis of the stomach   The jejunum appeared normal  · See above for current plan      NOAH (acute kidney injury) ([address] )  · POA: 2/2 acute blood loss anemia/GIB, hypotension on presentation to ER  · Baseline creatinine 1  · BUN / Cr: 25 / 1 39, on recent discharge was BUN / Cr: 7 / 1 09  · Transfuse as above  · Start IVF with isolyte   · Continue to monitor UO and renal indices  · BMP in am  · Avoid hypotension/nephrotoxins      H/O bariatric surgery - bypass  · Patient reports remote history of Savage-en-Y gastric bypass approximately 12 years ago, at [facility]   · She states she has not been taking vitamin supplementation for some time now  · Also reports loss of appetite and poor po intake  · Denies nausea and vomiting prior to today    Severe protein-calorie malnutrition (HCC)  Malnutrition Findings: BMI 20 52  Sunken orbits, protruding clavicles, loss of subcutaneous fat and muscle waisting  Patient states that she has not had an appetite for months and has stopped taking her vitamin supplements recommended in the setting of Savage-en-Y gastric bypass "sometime ago"  · NPO for now 2/2 BRBPR and reported hematemesis  · Nutrition counseling  · Nutrition consult  BMI Findings: Body mass index is 20 52 kg/m²  Hypothyroidism  Lab Results   Component Value Date    [id number] 3 492 05/24/2022   · Holding home levothyroxine in the setting of GIB   · Resume once okay to take PO per GI      Patient appropriate for transfer out of the ICU today?: No  Disposition: Continue Critical Care   Code Status: Level 1 - Full Code  ---------------------------------------------------------------------------------------  ICU CORE MEASURES    Prophylaxis   VTE Pharmacologic Prophylaxis: Pharmacologic VTE Prophylaxis contraindicated due to actively bleeding from unknown source  VTE Mechanical Prophylaxis: sequential compression device  Stress Ulcer Prophylaxis: Pantoprazole IV     ABCDE Protocol (if indicated)  Plan to perform spontaneous awakening trial today? Not applicable  Plan to perform spontaneous breathing trial today?  Not applicable  Obvious barriers to extubation? Not applicable  CAM-ICU: Negative    Invasive Devices Review  Invasive Devices  Report    Peripheral Intravenous Line  Duration           Peripheral IV 22 Left Hand <1 day    Peripheral IV 22 Right Antecubital <1 day              Can any invasive devices be discontinued today? Not applicable  ---------------------------------------------------------------------------------------  OBJECTIVE    Vitals   Vitals:    22 0618 22 0625 22 0630 22 0700   BP: 95/50 96/54 99/56 102/58   BP Location:       Pulse: 77 75 74 78   Resp: (!) 33 20 20 18   Temp: 98 5 °F (36 9 °C) 98 5 °F (36 9 °C) 98 °F (36 7 °C) 98 2 °F (36 8 °C)   TempSrc:  Oral Oral Oral   SpO2:  98% 98% 99%   Weight:       Height:         Temp (24hrs), Av 9 °F (36 6 °C), Min:97 4 °F (36 3 °C), Max:98 5 °F (36 9 °C)  Current: Temperature: 98 2 °F (36 8 °C)  HR: 69-80   BP: /46-61  RR: 18-33  SpO2: 99% (RA)     Respiratory:  SpO2: SpO2: 99 %  Nasal Cannula O2 Flow Rate (L/min): 2 L/min    Invasive/non-invasive ventilation settings   Respiratory  Report   Lab Data (Last 4 hours)    None         O2/Vent Data (Last 4 hours)    None                Physical Exam  Vitals and nursing note reviewed  Constitutional:       General: She is awake  She is not in acute distress  Appearance: She is cachectic  She is ill-appearing  She is not toxic-appearing or diaphoretic  Interventions: Nasal cannula in place  HENT:      Head: Normocephalic and atraumatic  Right Ear: External ear normal       Left Ear: External ear normal       Nose: Nose normal  No congestion or rhinorrhea  Mouth/Throat:      Mouth: Mucous membranes are dry  Pharynx: Oropharynx is clear  No oropharyngeal exudate or posterior oropharyngeal erythema  Eyes:      General: No visual field deficit or scleral icterus  Extraocular Movements: Extraocular movements intact        Conjunctiva/sclera: Conjunctivae normal       Pupils: Pupils are equal, round, and reactive to light  Neck:      Vascular: No carotid bruit  Cardiovascular:      Rate and Rhythm: Normal rate and regular rhythm  Pulses:           Radial pulses are 1+ on the right side and 1+ on the left side  Dorsalis pedis pulses are 1+ on the right side and 1+ on the left side  Heart sounds: S1 normal and S2 normal  No murmur heard  No friction rub  No gallop  Pulmonary:      Effort: Tachypnea present  No accessory muscle usage or respiratory distress  Breath sounds: Normal breath sounds  No decreased breath sounds, wheezing, rhonchi or rales  Comments: - Patient exhibiting SOB with talking, rapid resolution once resting  Abdominal:      General: Abdomen is flat  Bowel sounds are normal  There is no distension  Palpations: Abdomen is soft  Tenderness: There is no abdominal tenderness  Musculoskeletal:         General: No swelling or tenderness  Normal range of motion  Cervical back: Normal range of motion and neck supple  Right lower leg: No edema  Left lower leg: No edema  Lymphadenopathy:      Cervical: No cervical adenopathy  Skin:     General: Skin is warm and dry  Capillary Refill: Capillary refill takes less than 2 seconds  Coloration: Skin is pale  Findings: No bruising, erythema or rash  Neurological:      General: No focal deficit present  Mental Status: She is alert and oriented to person, place, and time  GCS: GCS eye subscore is 4  GCS verbal subscore is 5  GCS motor subscore is 6  Cranial Nerves: No cranial nerve deficit, dysarthria or facial asymmetry  Sensory: No sensory deficit  Psychiatric:         Attention and Perception: Attention normal          Mood and Affect: Mood is anxious  Behavior: Behavior is cooperative  Thought Content:  Thought content normal          Cognition and Memory: Cognition normal          Judgment: Judgment normal  Laboratory and Diagnostics:  Results from last 7 days   Lab Units 06/12/22  0507 06/12/22  0102 06/11/22  1704   WBC Thousand/uL 10 24*  --  13 08*   HEMOGLOBIN g/dL 5 9* 7 3* 4 4*   HEMATOCRIT % 17 2* 21 7* 14 9*   PLATELETS Thousands/uL 460*  --  635*   NEUTROS PCT % 57  --   --    BANDS PCT %  --   --  3   MONOS PCT % 9  --   --    MONO PCT %  --   --  5     Results from last 7 days   Lab Units 06/12/22  0507 06/11/22  1704   SODIUM mmol/L 136 133*   POTASSIUM mmol/L 4 0 4 4   CHLORIDE mmol/L 106 105   CO2 mmol/L 16* 18*   ANION GAP mmol/L 14* 10   BUN mg/dL 27* 25   CREATININE mg/dL 0 96 1 39*   CALCIUM mg/dL 6 0* 7 4*   GLUCOSE RANDOM mg/dL 69 119   ALT U/L 7 8   AST U/L 12* 18   ALK PHOS U/L 30* 40   ALBUMIN g/dL 1 9* 2 3*   TOTAL BILIRUBIN mg/dL 1 09* 0 39     Results from last 7 days   Lab Units 06/12/22  0507   MAGNESIUM mg/dL 2 3   PHOSPHORUS mg/dL 2 7      Results from last 7 days   Lab Units 06/12/22  0507 06/11/22  1704   INR  1 48* 1 40*              ABG:    VBG:          Micro        EKG: NSR  Imaging:  I have personally reviewed pertinent reports  Intake and Output  I/O       06/10 0701  06/11 0700 06/11 0701  06/12 0700    I V  (mL/kg)  645 9 (12 7)    Blood  794 2    Total Intake(mL/kg)  1440 1 (28 3)    Urine (mL/kg/hr)  300    Total Output  300    Net  +1140 1          Unmeasured Urine Occurrence  1 x        UOP: 300 mL/kg/hr (one occurrence)    Height and Weights   Height: 5' 4" (162 6 cm)     Body mass index is 19 26 kg/m²  Weight (last 2 days)     Date/Time Weight    06/11/22 2055 50 9 (112 21)    06/11/22 2018 50 9 (112 21)            Nutrition       Diet Orders   (From admission, onward)             Start     Ordered    06/11/22 2010  Diet NPO  Diet effective now        References:    Nutrtion Support Algorithm Enteral vs  Parenteral   Question Answer Comment   Diet Type NPO    RD to adjust diet per protocol?  Yes        06/11/22 2009                  Active Medications  Scheduled Meds:  Current Facility-Administered Medications   Medication Dose Route Frequency Provider Last Rate    multi-electrolyte  75 mL/hr Intravenous Continuous Wyona Conch, CRNP 75 mL/hr (06/11/22 2043)    ondansetron  4 mg Intravenous Q6H PRN Wyona Conch, CRNP      pantoprozole (PROTONIX) infusion (Continuous)  8 mg/hr Intravenous Continuous Wyona Conch, CRNP 8 mg/hr (06/12/22 0103)     Continuous Infusions:  multi-electrolyte, 75 mL/hr, Last Rate: 75 mL/hr (06/11/22 2043)  pantoprozole (PROTONIX) infusion (Continuous), 8 mg/hr, Last Rate: 8 mg/hr (06/12/22 0103)      PRN Meds:   ondansetron, 4 mg, Q6H PRN        Allergies   No Known Allergies  ---------------------------------------------------------------------------------------  Advance Directive and Living Will:      Power of :    POLST:    ---------------------------------------------------------------------------------------  Care Time Delivered:     Salo Giron DO      Portions of the record may have been created with voice recognition software  Occasional wrong word or "sound a like" substitutions may have occurred due to the inherent limitations of voice recognition software    Read the chart carefully and recognize, using context, where substitutions have occurred

## 2022-06-12 NOTE — ASSESSMENT & PLAN NOTE
Malnutrition Findings: BMI 20 52  Sunken orbits, protruding clavicles, loss of subcutaneous fat and muscle waisting  Patient states that she has not had an appetite for months and has stopped taking her vitamin supplements recommended in the setting of Savage-en-Y gastric bypass "sometime ago"  · NPO for now 2/2 BRBPR and reported hematemesis  · Nutrition counseling  · Nutrition consult                                BMI Findings: Body mass index is 20 52 kg/m²

## 2022-06-12 NOTE — ANESTHESIA PREPROCEDURE EVALUATION
Procedure:  EGD    Relevant Problems   CARDIO   (+) Dyspnea on exertion      ENDO   (+) Hypothyroidism      GI/HEPATIC   (+) BRBPR (bright red blood per rectum)   (+) Gastric ulcer   (+) Gastroesophageal reflux disease without esophagitis   (+) H/O bariatric surgery - bypass   (+) Hematemesis      /RENAL   (+) NOAH (acute kidney injury) (HCC)      HEMATOLOGY   (+) Acute blood loss anemia   (+) Anemia      MUSCULOSKELETAL   (+) Fibromyalgia syndrome   (+) Fibromyalgia, primary      NEURO/PSYCH   (+) Fibromyalgia syndrome   (+) Fibromyalgia, primary   (+) Mixed anxiety depressive disorder      PULMONARY   (+) Dyspnea on exertion   (+) Shortness of breath      Hematemesis and BRBPR suspect rapid upper GI bleed given history of gastric ulcers  Patient requiring transfusion with multiple units of pRBCs  Physical Exam    Airway    Mallampati score: II  TM Distance: >3 FB  Neck ROM: full     Dental   No notable dental hx     Cardiovascular  Cardiovascular exam normal    Pulmonary  Pulmonary exam normal     Other Findings  Significant amount of scaring on face, jaw, upper chest from [facility] 40 years ago  Pt denies limitation in ROM of neck/jaw  Denies history of difficult intubation  Anesthesia Plan  ASA Score- 3 Emergent    Anesthesia Type- general with ASA Monitors  Additional Monitors:   Airway Plan: ETT  Plan Factors-    Chart reviewed  Existing labs reviewed  Patient summary reviewed  Induction- intravenous and rapid sequence induction  Postoperative Plan- Plan for postoperative opioid use  Planned trial extubation    Informed Consent- Anesthetic plan and risks discussed with patient  I personally reviewed this patient with the CRNA  Discussed and agreed on the Anesthesia Plan with the CRNA  Jessica Duran

## 2022-06-12 NOTE — ASSESSMENT & PLAN NOTE
· POA: 2/2 acute blood loss anemia/GIB, hypotension on presentation to ER  · Baseline creatinine 1  · BUN / Cr: 25 / 1 39, on recent discharge was BUN / Cr: 7 / 1 09  · Transfuse as above  · Start IVF with isolyte   · Continue to monitor UO and renal indices  · BMP in am  · Avoid hypotension/nephrotoxins

## 2022-06-12 NOTE — ASSESSMENT & PLAN NOTE
· Patient reports remote history of Savage-en-Y gastric bypass approximately 12 years ago, at [facility]   · She states she has not been taking vitamin supplementation for some time now  · Also reports loss of appetite and poor po intake  · Denies nausea and vomiting prior to today

## 2022-06-12 NOTE — ASSESSMENT & PLAN NOTE
Lab Results   Component Value Date    [id number] 3 492 05/24/2022       · Holding home levothyroxine in the setting of GIB   · Resume once okay to take PO per GI

## 2022-06-12 NOTE — ASSESSMENT & PLAN NOTE
ASSESSMENT  · Admitted on 6/11 with acute blood loss anemia with an Hgb of 4 4 in ED given 2 units pRBC's  · Recent admission (05/24) for symptomatic anemia and discharged with Hgb of 8 3     · Hx of Gastric ulcers, see below  · Currently not on any AC/AP at home  · Started on IV Protonix and consulted GI  · Pt underwent an EGD on 6/12 which showed a single large, deep, irregular, benign-appearing ulcer in the gastrojejunal anastomosis with adherent clot  Placed 2 clips, injected epinephrine, hemostasis achieved  -  · Was called my [facility] provider last night to evaluate her  She had a large maroon/burgandy BM which was followed by hypotension (80's/40's) and tachycardia (110's)  · Pt complaining of lower ABD pain, cramping in nature  · Pt appeared pale, tachypneic and weak  · STAT labs: Hgb 6 2, down from 9 9 @ 1300  · Lactic 3 6  · GI already following, was called to evaluate and she was taken for an emergent EGD  · EGD 6/15 - Single large, deep, irregular ulcer in the gastrojejunal anastomosis with nonbleeding visible vessel  Induced coagulation and hemostasis achieved with 2 applications of bipolar cautery, epinephrine injection; hemostasis achieved    · Surgery consulted Prior to EGD  · Received 1L NSS and 250mL Albumin and 2 units PRBC's  · Given 80mg IV Protonix and started on Protonix gtt @ 8mg/hr    PLAN  · Continue to trend H/H q6h  · Transfuse for Hgb < 7 or active bleeding, s/s hemodynamic instability  · Follow up lactate now  · Isolyte @ 125cc/hr  · Maintain 2 large bore IV's, pt a difficult stick may need a PICC line  · Monitor VS  · Check iCal and replete accordingly d/t frequent transfusions, see below  · Keep NPO  · Zofran PRN for N/V  · Surgery recommend Misoprostol 200mg PO AC/HS, will hold for now while NPO and discuss starting when cleared for PO  · Outpatient EGD in 3 months with GI team  · Follow up on biopsies

## 2022-06-12 NOTE — ASSESSMENT & PLAN NOTE
· POA: noted in ER  · Denies previous bloody or tarry stools  States that she has not had BM in two weeks  Denies abdominal pain, distension      · Patient with previous known gastric ulcers  · As above

## 2022-06-12 NOTE — H&P
200  [address] [facility] 1945, 64 y o  female MRN: [id number]  Unit/Bed#: ICU 03 Encounter: [id number]  Primary Care Provider: Allegra Curtis MD   Date and time admitted to hospital: 6/11/2022  4:40 PM    * Acute blood loss anemia  Assessment & Plan  · POA: suspected upper GIB given history of gastric ulcers  · Recent admission for symptomatic anemia  Hgb at time of discharge 8 3  On presentation today hgb 4 4, platelets 440, INR 1 4  · Has history of gastric ulcers in the past   Has had recent EGD with large ulcer, not actively bleeding  · Not on AC/AP at home  · Received 2 units PRBC in ER  · BP initially 80s/50s, HR 70s-80s, following first unit BP 96/51  · Not on AV carina blockers  · EKG Sinus rhythm  · Continue to trend H/H q4h  · Transfuse for Hgb < 7 or active bleeding  · GI consulted, appreciate recommendations -- plan to volume resuscitate / transfuse overnight and possible EGD in am   If becomes unstable will have emergent EGD overnight  · Was given bolus Protonix and started on infusion -- will continue Protonix gtt  · NPO for now      BRBPR (bright red blood per rectum)  Assessment & Plan  · POA: noted in ER  · Denies previous bloody or tarry stools  States that she has not had BM in two weeks  Denies abdominal pain, distension  · Patient with previous known gastric ulcers  · As above    Hematemesis  Assessment & Plan  · Patient reports "fresh blood" emesis at home x1 today  · See above    NOAH (acute kidney injury) ([facility] [address] )  Assessment & Plan  · POA: 2/2 acute blood loss anemia/GIB, hypotension on presentation to ER  · Baseline creatinine 1  · BUN / Cr: 25 / 1 39, on recent discharge was BUN / Cr: 7 / 1 09  · Transfuse as above  · Start IVF with isolyte   · Continue to monitor UO and renal indices  · BMP in am  · Avoid hypotension/nephrotoxins    Gastric ulcer  Assessment & Plan  · Recent admission for symptomatic anemia    EGD during that admission with noted ulcer, no active bleeding  Patient was to have follow up EGD and to continue PPI and Carafate  · EGD 5/26: Single large, deep, irregular, benign-appearing ulcer in the gastric pouch and gastrojejunal anastomosis; performed cold forceps biopsy  Large ulcer noted at the anastomosis/distal gastric pouch  Staples noted in this area  No active bleeding from the ulceration  The ulceration is large measuring at least 2 5 x 3 cm  · EGD 7/2021: Signs of previous gastric bypass surgery in the stomach  History of savage en y gastric bypass  Mild erythematous mucosa in the body of the stomach and antrum; performed cold forceps biopsy  Biopsies obtained to rule out H  Pylori  The jejunum appeared normal  · EGD 9/2019: Signs of previous gastric bypass surgery in the stomach  History of [place] en y gastric bypass  Multiple cratered, linear, benign-appearing ulcers in the body of the stomach and anastomosis of the stomach  The jejunum appeared normal  · See above for current plan    Severe protein-calorie malnutrition ([address] )  Assessment & Plan  Malnutrition Findings: BMI 20 52  Sunken orbits, protruding clavicles, loss of subcutaneous fat and muscle waisting  Patient states that she has not had an appetite for months and has stopped taking her vitamin supplements recommended in the setting of [place]-en-Y gastric bypass "sometime ago"  · NPO for now 2/2 BRBPR and reported hematemesis  · Nutrition counseling  · Nutrition consult                                BMI Findings: Body mass index is 20 52 kg/m²         H/O bariatric surgery - bypass  Assessment & Plan  · Patient reports remote history of [place]-en-Y gastric bypass approximately 12 years ago, at [facility]   · She states she has not been taking vitamin supplementation for some time now  · Also reports loss of appetite and poor po intake  · Denies nausea and vomiting prior to today    Hypothyroidism  Assessment & Plan  Lab Results   Component Value Date    [id number] 3 492 05/24/2022       · Holding home levothyroxine in the setting of GIB   · Resume once okay to take PO per GI    -------------------------------------------------------------------------------------------------------------  Chief Complaint: Hematemesis and BRBPR    History of Present Illness     Steevn Modi is a 64 y o  female who presents with PMHx remote Savage-en-Y gastric bypass, recent admission for symptomatic anemia, EGD with large ulceration without active bleeding, hypothyroidism, and protein calorie malnutrition  She presents from home today with reported new onset hematemesis and bright red blood rectum  She denies previous gastric/abdominal pain, nausea, abdominal distension, and symptoms of reflux  She does however support poor p o  Intake, loss of appetite, she also states she has not taken vitamin supplementation in sometime is unsure if she took her Protonix/Carafate today  In the ER she was noted to have BRBPR, initial labs reveal hemoglobin 4 4, this is down from 8 3 from her previous hospitalization, and increase in BUN and creatinine from baseline  Will admit to Step Down Level 1 under [facility]  History obtained from chart review and the patient   -------------------------------------------------------------------------------------------------------------  Dispo: Admit to Stepdown Level 1    Code Status: Level 1 - Full Code  --------------------------------------------------------------------------------------------------------------  Review of Systems   Constitutional: Positive for fatigue  Negative for chills (I feel cold, but I don't have the chills) and fever  HENT: Negative  Eyes: Negative  Respiratory: Negative for cough, chest tightness, shortness of breath and wheezing  Cardiovascular: Negative for chest pain, palpitations and leg swelling  Gastrointestinal: Positive for blood in stool, nausea and vomiting   Negative for abdominal distention, abdominal pain and diarrhea  Endocrine: Negative  Genitourinary: Negative for decreased urine volume, difficulty urinating, dysuria, frequency, hematuria and urgency  Musculoskeletal: Negative for arthralgias and myalgias  Skin: Positive for pallor  Negative for rash and wound  Allergic/Immunologic: Negative  Neurological: Positive for weakness (generalized)  Negative for dizziness, syncope and light-headedness  Hematological: Negative  Psychiatric/Behavioral: Negative for agitation and confusion  The patient is not nervous/anxious  A 12-point, complete review of systems was reviewed and negative except as stated above     Physical Exam  Vitals and nursing note reviewed  Constitutional:       General: She is in acute distress  Appearance: She is cachectic  She is ill-appearing  She is not toxic-appearing  Interventions: Nasal cannula in place  HENT:      Head: Normocephalic and atraumatic  Right Ear: External ear normal       Left Ear: External ear normal       Nose: Nose normal  No congestion or rhinorrhea  Mouth/Throat:      Mouth: Mucous membranes are dry  Pharynx: Oropharynx is clear  No oropharyngeal exudate or posterior oropharyngeal erythema  Eyes:      General: No visual field deficit or scleral icterus  Extraocular Movements: Extraocular movements intact  Conjunctiva/sclera: Conjunctivae normal       Pupils: Pupils are equal, round, and reactive to light  Neck:      Vascular: No carotid bruit  Cardiovascular:      Rate and Rhythm: Normal rate and regular rhythm  Pulses:           Radial pulses are 1+ on the right side and 1+ on the left side  Dorsalis pedis pulses are 1+ on the right side and 1+ on the left side  Heart sounds: S1 normal and S2 normal  No murmur heard  No friction rub  No gallop  Pulmonary:      Effort: Tachypnea present  No accessory muscle usage or respiratory distress  Breath sounds:  No decreased breath sounds, wheezing, rhonchi or rales  Comments: Patient getting winded with talking, but recovers quickly with rest   Abdominal:      General: Abdomen is flat  Bowel sounds are normal  There is no distension  Palpations: Abdomen is soft  Tenderness: There is no abdominal tenderness  Musculoskeletal:         General: No swelling or tenderness  Normal range of motion  Cervical back: Normal range of motion and neck supple  Right lower leg: No edema  Left lower leg: No edema  Lymphadenopathy:      Cervical: No cervical adenopathy  Skin:     General: Skin is warm and dry  Capillary Refill: Capillary refill takes less than 2 seconds  Coloration: Skin is pale  Findings: No bruising, erythema or rash  Neurological:      General: No focal deficit present  Mental Status: She is alert and oriented to person, place, and time  GCS: GCS eye subscore is 4  GCS verbal subscore is 5  GCS motor subscore is 6  Cranial Nerves: No cranial nerve deficit, dysarthria or facial asymmetry  Sensory: No sensory deficit  Psychiatric:         Attention and Perception: Attention normal          Mood and Affect: Mood is anxious  Behavior: Behavior is cooperative  Thought Content:  Thought content normal          Cognition and Memory: Cognition normal          Judgment: Judgment normal        --------------------------------------------------------------------------------------------------------------  Vitals:   Vitals:    06/11/22 2200 06/11/22 2230 06/11/22 2300 06/11/22 2330   BP: (!) 88/52 98/55 (!) 105/46 110/55   BP Location:       Pulse: 75 76 79 77   Resp: 20 22 20 20   Temp: 98 °F (36 7 °C) 97 8 °F (36 6 °C) 97 8 °F (36 6 °C) 98 °F (36 7 °C)   TempSrc: Oral Oral Oral Oral   SpO2: 100% 100% 100% 100%   Weight:       Height:         Temp  Min: 97 4 °F (36 3 °C)  Max: 98 1 °F (36 7 °C)     Height: 5' 4" (162 6 cm)  Body mass index is 19 26 kg/m²  Laboratory and Diagnostics:  Results from last 7 days   Lab Units 06/12/22  0102 06/11/22  1704   WBC Thousand/uL  --  13 08*   HEMOGLOBIN g/dL 7 3* 4 4*   HEMATOCRIT % 21 7* 14 9*   PLATELETS Thousands/uL  --  635*   BANDS PCT %  --  3   MONO PCT %  --  5     Results from last 7 days   Lab Units 06/11/22  1704   SODIUM mmol/L 133*   POTASSIUM mmol/L 4 4   CHLORIDE mmol/L 105   CO2 mmol/L 18*   ANION GAP mmol/L 10   BUN mg/dL 25   CREATININE mg/dL 1 39*   CALCIUM mg/dL 7 4*   GLUCOSE RANDOM mg/dL 119   ALT U/L 8   AST U/L 18   ALK PHOS U/L 40   ALBUMIN g/dL 2 3*   TOTAL BILIRUBIN mg/dL 0 39          Results from last 7 days   Lab Units 06/11/22  1704   INR  1 40*              ABG:    VBG:          Micro:        EKG: Sinus rhythm on telemetry  Imaging: I have personally reviewed pertinent reports     and I have personally reviewed pertinent films in PACS      Historical Information   Past Medical History:   Diagnosis Date    Anemia     Anxiety     Bipolar disorder ([address] )     Colon polyp     Disease of thyroid gland     Essential hypertension     Essential tremor     Fibromyalgia, primary     GERD (gastroesophageal reflux disease)     Inflammatory polyarthropathy (HCC)     Mammogram abnormal      Past Surgical History:   Procedure Laterality Date    BREAST BIOPSY Right 2015    benign    CARPAL TUNNEL RELEASE Bilateral     COLONOSCOPY      EGD AND COLONOSCOPY  01/01/2014    GASTRIC BYPASS  07/01/2012    MAMMO NEEDLE LOCALIZATION RIGHT (ALL INC) Right 2/6/2012    MAMMO NEEDLE LOCALIZATION RIGHT (ALL INC) Right 2/6/2012    ULNAR NERVE TRANSPOSITION Right      Social History   Social History     Substance and Sexual Activity   Alcohol Use Yes    Alcohol/week: 4 0 standard drinks    Types: 4 Glasses of wine per week    Comment: rare     Social History     Substance and Sexual Activity   Drug Use Never     Social History     Tobacco Use   Smoking Status Former Smoker    Quit date: 1986    Years since quittin 4   Smokeless Tobacco Never Used     Exercise History: independent  Family History:   Family History   Problem Relation Age of Onset    No Known Problems Mother     No Known Problems Father     No Known Problems Sister     No Known Problems Maternal Grandmother     No Known Problems Maternal Grandfather     No Known Problems Paternal Grandmother     No Known Problems Paternal Grandfather     No Known Problems Sister     No Known Problems Sister     Stomach cancer Maternal Aunt     No Known Problems Maternal Aunt     No Known Problems Maternal Aunt     No Known Problems Maternal Aunt     No Known Problems Paternal Aunt     Leukemia Other      I have reviewed this patient's family history and commented on sigificant items within the HPI      Medications:  Current Facility-Administered Medications   Medication Dose Route Frequency    multi-electrolyte (PLASMALYTE-A/ISOLYTE-S PH 7 4) IV solution  75 mL/hr Intravenous Continuous    pantoprazole (PROTONIX) 80 mg in sodium chloride 0 9 % 100 mL infusion  8 mg/hr Intravenous Continuous     Home medications:  Prior to Admission Medications   Prescriptions Last Dose Informant Patient Reported? Taking?    clonazePAM (KlonoPIN) 1 mg tablet 6/10/2022 at Unknown time Self Yes Yes   Sig: TK 3 TS PO Q NIGHT   levothyroxine 112 mcg tablet 2022 at Unknown time Self Yes Yes   Sig: TK 1 T PO QD   pantoprazole (PROTONIX) 40 mg tablet 2022 at Unknown time  No Yes   Sig: Take 1 tablet (40 mg total) by mouth 2 (two) times a day before meals   sucralfate (CARAFATE) 1 g tablet 2022 at Unknown time  No Yes   Sig: Take 1 tablet (1 g total) by mouth 4 (four) times a day (before meals and at bedtime)   traZODone (DESYREL) 50 mg tablet 6/10/2022 at Unknown time Self Yes Yes   Sig: Take 150 mg by mouth daily at bedtime      Facility-Administered Medications: None     Allergies:  No Known Allergies    ------------------------------------------------------------------------------------------------------------  Advance Directive and Living Will:      Power of :    POLST:    ------------------------------------------------------------------------------------------------------------  Anticipated Length of Stay is > 2 midnights    Care Time Delivered:   Upon my evaluation, this patient had a high probability of imminent or life-threatening deterioration due to acute blood loss anemia, suspected Upper GIB given history of previous gastric ulcers, which required my direct attention, intervention, and personal management  I have personally provided 60 minutes (1858 to 1958) of critical care time, exclusive of procedures, teaching, family meetings, and any prior time recorded by providers other than myself  TOUL Poole        Portions of the record may have been created with voice recognition software  Occasional wrong word or "sound a like" substitutions may have occurred due to the inherent limitations of voice recognition software    Read the chart carefully and recognize, using context, where substitutions have occurred

## 2022-06-12 NOTE — ASSESSMENT & PLAN NOTE
· Recent admission for symptomatic anemia  EGD during that admission with noted ulcer, no active bleeding  Patient was to have follow up EGD and to continue PPI and Carafate  · EGD 5/26: Single large, deep, irregular, benign-appearing ulcer in the gastric pouch and gastrojejunal anastomosis; performed cold forceps biopsy  Large ulcer noted at the anastomosis/distal gastric pouch  Staples noted in this area  No active bleeding from the ulceration  The ulceration is large measuring at least 2 5 x 3 cm  · EGD 7/2021: Signs of previous gastric bypass surgery in the stomach  History of [place] en y gastric bypass  Mild erythematous mucosa in the body of the stomach and antrum; performed cold forceps biopsy  Biopsies obtained to rule out H  Pylori  The jejunum appeared normal  · EGD 9/2019: Signs of previous gastric bypass surgery in the stomach  History of [place] en y gastric bypass  Multiple cratered, linear, benign-appearing ulcers in the body of the stomach and anastomosis of the stomach   The jejunum appeared normal  · See above for current plan

## 2022-06-12 NOTE — CONSULTS
Consultation - [facility]  Godfreyer Drafts 64 y o  female MRN: [id number]  Unit/Bed#: ICU 03 Encounter: [id number]        Inpatient consult to gastroenterology  Consult performed by: Kita Ojeda PA-C  Consult ordered by: Ana Laura Belle, Jean Chavez          Reason for Consult / Principal Problem: hematemesis and BRBPR    ASSESSMENT and PLAN:    Principal Problem:    Acute blood loss anemia  Active Problems:    Hypothyroidism    H/O bariatric surgery - bypass    Severe protein-calorie malnutrition (HCC)    Gastric ulcer    Hematemesis    BRBPR (bright red blood per rectum)    NOAH (acute kidney injury) ([facility] [address] )    #1  Hematemesis and bright red blood per rectum:  Suspect rapid upper GI bleed from known peptic ulcer  EGD performed on 05/26 was notable for a large ulcer at the anastomosis site with no active bleeding at that time  Upon presentation the patient's hemoglobin was 4 4, this went up to 7 3 and dropped back down to 5 9 again this morning  Patient is getting another unit of blood now   -continue to monitor vital signs and hemoglobin closely  -transfuse as necessary  -PPI drip  -NPO  -10 mg Reglan stat  -plan for emergent endoscopy today for further evaluation     -------------------------------------------------------------------------------------------------------------------    HPI:  This is a 49-year-old female with a history of bipolar disorder, colon polyps, hypertension, fibromyalgia, GERD, and gastric bypass who presents to the hospital secondary to hematemesis  Patient resides at home and reports that she had a sudden onset of nausea with vomiting of bright red blood yesterday prompting her to call EMS  Since coming to the hospital she has had several maroon bloody stools  She has had no further vomiting  She is not clear about her medications and is unclear if she was taking her PPI since leaving the hospital a few weeks ago    She was seen for GI bleeding at [facility] at the end of May and had an EGD and colonoscopy on 05/26  She had a large anastomotic ulcer at that time that was not actively bleeding  Her colonoscopy was normal aside from his few diverticulosis pockets  She denies any abdominal pain currently but reports she had some upper abdominal discomfort earlier today  She denies taking any NSAIDs  She has not had any thing to eat since coming into the hospital     REVIEW OF SYSTEMS:    CONSTITUTIONAL: Denies any fever, chills, or rigors  Good appetite, and no recent weight loss  HEENT: No earache or tinnitus  Denies hearing loss or visual disturbances  CARDIOVASCULAR: No chest pain or palpitations  RESPIRATORY: Denies any cough, hemoptysis,  dyspnea on exertion  Shortness of breath  GASTROINTESTINAL: As noted in the History of Present Illness  GENITOURINARY: No problems with urination  Denies any hematuria or dysuria  NEUROLOGIC: No dizziness or vertigo, denies headaches  MUSCULOSKELETAL: Denies any muscle or joint pain  SKIN: Denies skin rashes or itching  ENDOCRINE: Denies excessive thirst  Denies intolerance to heat or cold  PSYCHOSOCIAL: Denies depression or anxiety  Denies any recent memory loss         Historical Information   Past Medical History:   Diagnosis Date    Anemia     Anxiety     Bipolar disorder ([address] )     Colon polyp     Disease of thyroid gland     Essential hypertension     Essential tremor     Fibromyalgia, primary     GERD (gastroesophageal reflux disease)     Inflammatory polyarthropathy (HCC)     Mammogram abnormal      Past Surgical History:   Procedure Laterality Date    BREAST BIOPSY Right 2015    benign    CARPAL TUNNEL RELEASE Bilateral     COLONOSCOPY      EGD AND COLONOSCOPY  01/01/2014    GASTRIC BYPASS  07/01/2012    MAMMO NEEDLE LOCALIZATION RIGHT (ALL INC) Right 2/6/2012    MAMMO NEEDLE LOCALIZATION RIGHT (ALL INC) Right 2/6/2012    ULNAR NERVE TRANSPOSITION Right      Social History Social History     Substance and Sexual Activity   Alcohol Use Yes    Alcohol/week: 4 0 standard drinks    Types: 4 Glasses of wine per week    Comment: rare     Social History     Substance and Sexual Activity   Drug Use Never     Social History     Tobacco Use   Smoking Status Former Smoker    Quit date:     Years since quittin 4   Smokeless Tobacco Never Used     Family History   Problem Relation Age of Onset    No Known Problems Mother     No Known Problems Father     No Known Problems Sister     No Known Problems Maternal Grandmother     No Known Problems Maternal Grandfather     No Known Problems Paternal Grandmother     No Known Problems Paternal Grandfather     No Known Problems Sister     No Known Problems Sister     Stomach cancer Maternal Aunt     No Known Problems Maternal Aunt     No Known Problems Maternal Aunt     No Known Problems Maternal Aunt     No Known Problems Paternal Aunt     Leukemia Other        Meds/Allergies     Medications Prior to Admission   Medication    clonazePAM (KlonoPIN) 1 mg tablet    levothyroxine 112 mcg tablet    pantoprazole (PROTONIX) 40 mg tablet    sucralfate (CARAFATE) 1 g tablet    traZODone (DESYREL) 50 mg tablet     Current Facility-Administered Medications   Medication Dose Route Frequency    metoclopramide (REGLAN) injection 10 mg  10 mg Intravenous Once    multi-electrolyte (PLASMALYTE-A/ISOLYTE-S PH 7 4) IV solution  75 mL/hr Intravenous Continuous    ondansetron (ZOFRAN) injection 4 mg  4 mg Intravenous Q6H PRN    pantoprazole (PROTONIX) 80 mg in sodium chloride 0 9 % 100 mL infusion  8 mg/hr Intravenous Continuous       No Known Allergies        Objective     Blood pressure 102/58, pulse 78, temperature 98 2 °F (36 8 °C), temperature source Oral, resp  rate 18, height 5' 4" (1 626 m), weight 50 9 kg (112 lb 3 4 oz), SpO2 99 %        Intake/Output Summary (Last 24 hours) at 2022 0830  Last data filed at 2022 0400  Gross per 24 hour   Intake 1440 09 ml   Output 300 ml   Net 1140 09 ml         PHYSICAL EXAM:      General Appearance:   Alert, cooperative, no distress, appears stated age; cachectic, thin, ill-appearing    HEENT:   Normocephalic, atraumatic, anicteric, no oropharyngeal thrush present      Neck:  Supple, symmetrical, trachea midline, no adenopathy;    thyroid: no enlargement/tenderness/nodules; no carotid  bruit or JVD    Lungs:   Clear to auscultation bilaterally; no rales, rhonchi or wheezing; respirations unlabored    Heart[de-identified]   S1 and S2 normal; regular rate and rhythm; no murmur, rub, or gallop  Abdomen:   Soft, non-tender, non-distended; normal bowel sounds; no masses, no organomegaly    Genitalia:   Deferred    Rectal:   Deferred    Extremities:  No cyanosis, clubbing or edema    Pulses:  2+ and symmetric all extremities    Skin:  Pale, Skin  texture, turgor normal, no rashes or lesions    Lymph nodes:  No palpable cervical, axillary or inguinal lymphadenopathy        Lab Results:   Results from last 7 days   Lab Units 06/12/22  0507   WBC Thousand/uL 10 24*   HEMOGLOBIN g/dL 5 9*   HEMATOCRIT % 17 2*   PLATELETS Thousands/uL 460*   NEUTROS PCT % 57   LYMPHS PCT % 25   MONOS PCT % 9   EOS PCT % 6     Results from last 7 days   Lab Units 06/12/22  0507   POTASSIUM mmol/L 4 0   CHLORIDE mmol/L 106   CO2 mmol/L 16*   BUN mg/dL 27*   CREATININE mg/dL 0 96   CALCIUM mg/dL 6 0*   ALK PHOS U/L 30*   ALT U/L 7   AST U/L 12*     Results from last 7 days   Lab Units 06/12/22  0507   INR  1 48*           Imaging Studies: I have personally reviewed pertinent imaging studies  No results found  Patient was seen and examined by Dr Juan Trinidad  All boogie medical decisions were made by Dr Juan Trinidad  Thank you for allowing us to participate in the care of this present patient  We will follow-up with you closely

## 2022-06-12 NOTE — ANESTHESIA POSTPROCEDURE EVALUATION
Post-Op Assessment Note    CV Status:  Stable  Pain Score: 0    Pain management: adequate     Mental Status:  Alert and sleepy   Hydration Status:  Euvolemic   PONV Controlled:  Controlled   Airway Patency:  Patent      Post Op Vitals Reviewed: Yes      Staff: CRNA         No complications documented      BP   118/56   Temp     Pulse  85   Resp   12   SpO2   97

## 2022-06-12 NOTE — ASSESSMENT & PLAN NOTE
· POA: suspected upper GIB given history of gastric ulcers  · Recent admission for symptomatic anemia  Hgb at time of discharge 8 3  On presentation today hgb 4 4, platelets 913, INR 1 4  · Has history of gastric ulcers in the past   Has had recent EGD with large ulcer, not actively bleeding  · Not on AC/AP at home  · Received 2 units PRBC in ER  · BP initially 80s/50s, HR 70s-80s, following first unit BP 96/51  · Not on AV carina blockers  · EKG Sinus rhythm  · Continue to trend H/H q4h  · Transfuse for Hgb < 7 or active bleeding  · GI consulted, appreciate recommendations -- plan to volume resuscitate / transfuse overnight and possible EGD in am   If becomes unstable will have emergent EGD overnight    · Was given bolus Protonix and started on infusion -- will continue Protonix gtt  · NPO for now

## 2022-06-13 PROBLEM — D72.829 LEUKOCYTOSIS: Status: ACTIVE | Noted: 2022-06-13

## 2022-06-13 LAB
ABO GROUP BLD BPU: NORMAL
ALBUMIN SERPL BCP-MCNC: 2.4 G/DL (ref 3.5–5)
ALP SERPL-CCNC: 42 U/L (ref 34–104)
ALT SERPL W P-5'-P-CCNC: 9 U/L (ref 7–52)
ANION GAP SERPL CALCULATED.3IONS-SCNC: 6 MMOL/L (ref 4–13)
AST SERPL W P-5'-P-CCNC: 17 U/L (ref 13–39)
ATRIAL RATE: 73 BPM
ATRIAL RATE: 94 BPM
BASOPHILS # BLD AUTO: 0.03 THOUSANDS/ΜL (ref 0–0.1)
BASOPHILS NFR BLD AUTO: 0 % (ref 0–1)
BILIRUB SERPL-MCNC: 0.75 MG/DL (ref 0.2–1)
BPU ID: NORMAL
BUN SERPL-MCNC: 20 MG/DL (ref 5–25)
CALCIUM ALBUM COR SERPL-MCNC: 8.9 MG/DL (ref 8.3–10.1)
CALCIUM SERPL-MCNC: 7.6 MG/DL (ref 8.4–10.2)
CHLORIDE SERPL-SCNC: 109 MMOL/L (ref 96–108)
CO2 SERPL-SCNC: 21 MMOL/L (ref 21–32)
CREAT SERPL-MCNC: 1.16 MG/DL (ref 0.6–1.3)
CROSSMATCH: NORMAL
EOSINOPHIL # BLD AUTO: 0.63 THOUSAND/ΜL (ref 0–0.61)
EOSINOPHIL NFR BLD AUTO: 5 % (ref 0–6)
ERYTHROCYTE [DISTWIDTH] IN BLOOD BY AUTOMATED COUNT: 19.2 % (ref 11.6–15.1)
GFR SERPL CREATININE-BSD FRML MDRD: 45 ML/MIN/1.73SQ M
GLUCOSE SERPL-MCNC: 86 MG/DL (ref 65–140)
HCT VFR BLD AUTO: 28.8 % (ref 34.8–46.1)
HGB BLD-MCNC: 9.9 G/DL (ref 11.5–15.4)
IMM GRANULOCYTES # BLD AUTO: 0.2 THOUSAND/UL (ref 0–0.2)
IMM GRANULOCYTES NFR BLD AUTO: 2 % (ref 0–2)
INR PPP: 1.05 (ref 0.84–1.19)
LYMPHOCYTES # BLD AUTO: 1.79 THOUSANDS/ΜL (ref 0.6–4.47)
LYMPHOCYTES NFR BLD AUTO: 14 % (ref 14–44)
MAGNESIUM SERPL-MCNC: 2.1 MG/DL (ref 1.9–2.7)
MCH RBC QN AUTO: 30 PG (ref 26.8–34.3)
MCHC RBC AUTO-ENTMCNC: 34.4 G/DL (ref 31.4–37.4)
MCV RBC AUTO: 87 FL (ref 82–98)
MONOCYTES # BLD AUTO: 1.28 THOUSAND/ΜL (ref 0.17–1.22)
MONOCYTES NFR BLD AUTO: 10 % (ref 4–12)
NEUTROPHILS # BLD AUTO: 8.78 THOUSANDS/ΜL (ref 1.85–7.62)
NEUTS SEG NFR BLD AUTO: 69 % (ref 43–75)
NRBC BLD AUTO-RTO: 0 /100 WBCS
P AXIS: 38 DEGREES
P AXIS: 55 DEGREES
PHOSPHATE SERPL-MCNC: 2.7 MG/DL (ref 2.3–4.1)
PLATELET # BLD AUTO: 407 THOUSANDS/UL (ref 149–390)
PMV BLD AUTO: 10.3 FL (ref 8.9–12.7)
POTASSIUM SERPL-SCNC: 3.7 MMOL/L (ref 3.5–5.3)
PR INTERVAL: 116 MS
PR INTERVAL: 86 MS
PROT SERPL-MCNC: 5.4 G/DL (ref 6.4–8.4)
PROTHROMBIN TIME: 13.7 SECONDS (ref 11.6–14.5)
QRS AXIS: 21 DEGREES
QRS AXIS: 27 DEGREES
QRSD INTERVAL: 78 MS
QRSD INTERVAL: 80 MS
QT INTERVAL: 358 MS
QT INTERVAL: 390 MS
QTC INTERVAL: 429 MS
QTC INTERVAL: 438 MS
RBC # BLD AUTO: 3.3 MILLION/UL (ref 3.81–5.12)
SODIUM SERPL-SCNC: 136 MMOL/L (ref 135–147)
T WAVE AXIS: 33 DEGREES
T WAVE AXIS: 35 DEGREES
UNIT DISPENSE STATUS: NORMAL
UNIT PRODUCT CODE: NORMAL
UNIT PRODUCT VOLUME: 300 ML
UNIT PRODUCT VOLUME: 350 ML
UNIT RH: NORMAL
VENTRICULAR RATE: 73 BPM
VENTRICULAR RATE: 90 BPM
WBC # BLD AUTO: 12.71 THOUSAND/UL (ref 4.31–10.16)

## 2022-06-13 PROCEDURE — 97167 OT EVAL HIGH COMPLEX 60 MIN: CPT

## 2022-06-13 PROCEDURE — 85025 COMPLETE CBC W/AUTO DIFF WBC: CPT | Performed by: NURSE PRACTITIONER

## 2022-06-13 PROCEDURE — 99232 SBSQ HOSP IP/OBS MODERATE 35: CPT | Performed by: INTERNAL MEDICINE

## 2022-06-13 PROCEDURE — 99222 1ST HOSP IP/OBS MODERATE 55: CPT | Performed by: NURSE PRACTITIONER

## 2022-06-13 PROCEDURE — 97129 THER IVNTJ 1ST 15 MIN: CPT

## 2022-06-13 PROCEDURE — 80053 COMPREHEN METABOLIC PANEL: CPT | Performed by: NURSE PRACTITIONER

## 2022-06-13 PROCEDURE — 85610 PROTHROMBIN TIME: CPT | Performed by: NURSE PRACTITIONER

## 2022-06-13 PROCEDURE — 84100 ASSAY OF PHOSPHORUS: CPT | Performed by: NURSE PRACTITIONER

## 2022-06-13 PROCEDURE — 83735 ASSAY OF MAGNESIUM: CPT | Performed by: NURSE PRACTITIONER

## 2022-06-13 PROCEDURE — 93010 ELECTROCARDIOGRAM REPORT: CPT | Performed by: INTERNAL MEDICINE

## 2022-06-13 RX ORDER — ACETAMINOPHEN 325 MG/1
650 TABLET ORAL EVERY 6 HOURS PRN
Status: DISCONTINUED | OUTPATIENT
Start: 2022-06-13 | End: 2022-06-16 | Stop reason: HOSPADM

## 2022-06-13 RX ADMIN — LEVOTHYROXINE SODIUM 112 MCG: 112 TABLET ORAL at 05:51

## 2022-06-13 RX ADMIN — ONDANSETRON 4 MG: 2 INJECTION INTRAMUSCULAR; INTRAVENOUS at 13:37

## 2022-06-13 RX ADMIN — ACETAMINOPHEN 650 MG: 325 TABLET ORAL at 22:37

## 2022-06-13 NOTE — CASE MANAGEMENT
Case Management Assessment & Discharge Planning Note    Patient name Julio César Deshpande  Location W /W -39 MRN [id number]  : 1945 Date 2022       Current Admission Date: 2022  Current Admission Diagnosis:Acute blood loss anemia   Patient Active Problem List    Diagnosis Date Noted    Leukocytosis 2022    Hematemesis 2022    BRBPR (bright red blood per rectum) 2022    Acute blood loss anemia 2022    NOAH (acute kidney injury) ([facility] [address] ) 2022    Gastric ulcer 2022    Fever 2022    Severe protein-calorie malnutrition ([facility] [address] ) 2022    Anemia 2022    Dyspnea on exertion 2022    Generalized weakness 2022    Hyponatremia 2022    Abnormal CT scan 2022    H/O bariatric surgery - bypass 2022    Cerumen debris on tympanic membrane of right ear 2022    Nasal congestion 2022    Bilateral hearing loss 2022    Fibromyalgia syndrome 2021    Osteopenia of both forearms 2021    Medicare annual wellness visit, subsequent 2021    Shortness of breath 2021    Acute bronchitis 2021    COVID-19 2021    Dysphasia 2020    Epigastric pain 2020    Hypothyroidism 2020    Gastroesophageal reflux disease without esophagitis 2020    Mixed anxiety depressive disorder 2020    Fibromyalgia, primary 2020    Iron deficiency 2020    Numbness of foot 2020      LOS (days): 2  Geometric Mean LOS (GMLOS) (days): 4 40  Days to GMLOS:2 6     OBJECTIVE:  PATIENT READMITTED TO HOSPITAL  Risk of Unplanned Readmission Score: 14 87     Current admission status: Inpatient    Preferred Pharmacy:   [facility] [address] [facility], [address] [place] [id number]  Phone: [phone] Fax: [phone]    Primary Care Provider: Ailyn Jacobsen MD    [facility]  Secondary Insurance:     ASSESSMENT:  Georgina Briceño Proxies    There are no active Health Care Proxies on file  Patient Information  Admitted from[de-identified] Home  Mental Status: Alert (but a little confused)  During Assessment patient was accompanied by: Not accompanied during assessment  Assessment information provided by[de-identified] Patient, Sister (Sister - Vamsi Rising by phone)  Primary Caregiver: Self  Support Systems: [address] [facility] of Residence: [address] [facility] do you live in?: [address] [facility] entry access options   Select all that apply : Stairs  Number of steps to enter home : 2  Type of Current Residence: 2 story home  Upon entering residence, is there a bedroom on the main floor (no further steps)?: No  A bedroom is located on the following floor levels of residence (select all that apply):: 2nd Floor  Upon entering residence, is there a bathroom on the main floor (no further steps)?: Yes (half bathroom on first floor and full bathroom on second floor)  Number of steps to 2nd floor from main floor: One Flight  Living Arrangements: Lives Alone    Activities of Daily Living Prior to Admission  Functional Status: Independent  Completes ADLs independently?: Yes  Ambulates independently?: Yes  Does patient use assisted devices?: No  Does patient currently own DME?: No  Does patient have a history of Outpatient Therapy (PT/OT)?: No  Does the patient have a history of Short-Term Rehab?: No  Does patient have a history of HHC?: No  Does patient currently have [facility]?: No    Patient Information Continued  Income Source: Pension/senior living  Does patient receive dialysis treatments?: No  Does patient have a history of substance abuse?: No  Does patient have a history of Mental Health Diagnosis?: No    Means of Transportation  Means of Transport to Appts[de-identified] Drives Self  In the past 12 months, has lack of transportation kept you from medical appointments or from getting medications?: No  In the past 12 months, has lack of transportation kept you from meetings, work, or from getting things needed for daily living?: No  Was application for public transport provided?: N/A    DISCHARGE DETAILS:    Discharge planning discussed with[de-identified] pt and her sister, Carole Henson    Other Referral/Resources/Interventions Provided:  Interventions: Other (Specify)  Referral Comments: CM met with pt and introduced self/role with dcp  Pt aware that PT&OT will evaluate and based on their recommendations CM will f/u  CM spoke with [address]  As per Radha Noriega [facility] completed evluation and pt deemed not to have capacity to make decisions  Geriatrics recommending for OT to complete and MOCA - CM notified OT of same  CM contacted pt sister, Carole Lam Natividad reports thier brother, Rick Rivera, is her POA and he is a professor for [employer] currently teaching a summer course in [facility] plans to return on July 1st  CM requested that Carole Henson reach out to him and find out if pt has a secondary POA listed in the event he is not available  CM explained ot Carole Henson that PT/OT will evaluate and CM will follow up with their recommendations  As per Carole Henson she resides in [place], another sister - Sailaja Schreiber resides in [place], Sister - Hilda Mulligan resides in [place], and they have a brother Halima Salvador who resides in [place]  As per Carole Henson pt was never  and no children  Family is limited in the support they can offer pt  Carole Henson reports she plans to contact [facility] and update him on conversation with CM  Aware PT/OT will evaluate  Briefly discussed STR if recommended vs returning home with assistance (pending OT evaluation)  As per Carole Henson pt does have anxiety and depression - never had IP treatment  Currently on medication prescribed by their PCP

## 2022-06-13 NOTE — QUICK NOTE
Patient seen and evaluated by Critical Care today and deemed to be appropriate for transfer to [facility]   Spoke to Dr Lexi Dent MD from [facility] to accept patient transfer  Patient will be under the care of Dr Mauricio Wild MD from [facility]  Patient did not move from ICU on the day of transfer  See progress note from 06/13/2022 for the most up-to-date treatment therapy plans  Critical care can be contacted via [employer] with any questions or concerns
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
## 2022-06-13 NOTE — OCCUPATIONAL THERAPY NOTE
Occupational Therapy Evaluation     Patient Name: Josh Sever  TVMRR'N Date: 6/13/2022  Problem List  Principal Problem:    Acute blood loss anemia  Active Problems:    Hypothyroidism    H/O bariatric surgery - bypass    Severe protein-calorie malnutrition (HCC)    Gastric ulcer    Hematemesis    BRBPR (bright red blood per rectum)    NOAH (acute kidney injury) ([facility] [address] )    Leukocytosis    Past Medical History  Past Medical History:   Diagnosis Date    Anemia     Anxiety     Bipolar disorder (HCC)     Colon polyp     Disease of thyroid gland     Essential hypertension     Essential tremor     Fibromyalgia, primary     GERD (gastroesophageal reflux disease)     Inflammatory polyarthropathy (HCC)     Mammogram abnormal      Past Surgical History  Past Surgical History:   Procedure Laterality Date    BREAST BIOPSY Right 2015    benign    CARPAL TUNNEL RELEASE Bilateral     COLONOSCOPY      EGD AND COLONOSCOPY  01/01/2014    GASTRIC BYPASS  07/01/2012    MAMMO NEEDLE LOCALIZATION RIGHT (ALL INC) Right 2/6/2012    MAMMO NEEDLE LOCALIZATION RIGHT (ALL INC) Right 2/6/2012    ULNAR NERVE TRANSPOSITION Right         06/13/22 1503   OT Last Visit   OT Visit Date 06/13/22  (Monday)   Note Type   Note type Evaluation  (and tx note (please see below))   Restrictions/Precautions   Weight Bearing Precautions Per Order No   Other Precautions Cognitive; Chair Alarm; Fall Risk  (Verdia Roam on room air)   Pain Assessment   Pain Assessment Tool 0-10   Pain Score No Pain  (reporting fatigue)   Home Living   Type of [address] [place] [address] Two level;Bed/bath upstairs;1/2 bath on main level   Bathroom Shower/Tub Tub/shower unit   Bathroom Toilet Standard   Bathroom Equipment Other (Comment)  (no DME)   [facility] Other (Comment)  (not using AD)   Additional Comments Pt reports living alone in 2 SH   Prior Function   Level of [place] Independent with ADLs and functional mobility   Lives With (S)  Alone   Receives Help From Family;Friend(s)   ADL Assistance Independent   IADLs Needs assistance  (- drive; needs to get a new license per pt)   Falls in the last 6 months 1 to 4   Vocational Retired   Comments Pt reports I w/ ADL / IADL ( w/ in home) w/ out use of AD   Lifestyle   Autonomy Pt reports I w/ ADL / IADL w/ in home  Brother in law assist w/ shopping as pt reports she needs to get a new license   Reciprocal Relationships Pt reports living alone and has family / friends   Service to Others Pt reports retired    Intrinsic Gratification Pt reports enjoying reading and watching movies  I love politics   Psychosocial   Patient Behaviors/Mood Cooperative   Subjective   Subjective "I feel horrible  I have not slept much since I came in on Friday"   ADL   Where Assessed Edge of bed   Eating Assistance 6  Modified independent   Eating Deficit Setup  (demo ROM/ coordination to complete)   Grooming Assistance 5  Supervision/Setup   Grooming Deficit Setup;Supervision/safety; Increased time to complete  (seated at EOB due to decreased standing tolerance)   UB Bathing Assistance Unable to assess  (decreased activity tolerance)   LB Bathing Assistance Unable to assess   UB Dressing Assistance 5  Supervision/Setup   UB Dressing Deficit Setup; Increased time to complete;Verbal cueing   LB Dressing Assistance 4  Minimal Assistance  (min A to maintain balance to manage pants up and over hip)   LB Dressing Deficit Steadying; Increased time to complete   Toileting Assistance  Unable to assess  (denied need to void  reports using bathroom earlier)   Additional Comments on room air O2 sats >90%   Bed Mobility   Supine to Sit 5  Supervision   Additional items Assist x 1;Bedrails; Increased time required   Sit to Supine 5  Supervision   Additional items Assist x 1; Increased time required;Verbal cues   Additional Comments Pt declined OOB to chair and requested to return to bed post eval  Needs met, call bell in reach   Transfers   Sit to Stand 5  Supervision   Additional items Assist x 1; Increased time required;Verbal cues  (cues for hand placement)   Stand to Sit 5  Supervision   Additional items Assist x 1; Increased time required;Verbal cues  (cues for hand placement)   Additional Comments Pt performed sit <>stand 2 X from EOB w/ S  Functional Mobility   Functional Mobility 4  Minimal assistance   Additional Comments engaged in short distance functional mobility few feet forward and back to EOB and side steps to her L w/ min A w/ out use of AD  Unsteady w/ 1 LOB  May benefit from use of AD   Additional items   (no AD)   Balance   Static Sitting Fair +   Dynamic Sitting Fair   Static Standing Poor +   Ambulatory Poor +   Activity Tolerance   Activity Tolerance Patient limited by fatigue   Medical Staff Made Aware spoke to CM   Nurse Made Aware per RNAllison / Annabel Patel appropriate to see pt   RUE Assessment   RUE Assessment WFL   RUE Strength   RUE Overall Strength Within Functional Limits - able to perform ADL tasks with strength   LUE Assessment   LUE Assessment WFL   LUE Strength   LUE Overall Strength Within Functional Limits - able to perform ADL tasks with strength   Hand Function   Gross Motor Coordination Functional  (R hand dominance)   Fine Motor Coordination Functional   Sensation   Light Touch   (responed to light touch)   Vision-Basic Assessment   Current Vision Wears glasses only for reading   Cognition   Overall Cognitive Status Impaired   Arousal/Participation Arousable;Responsive; Cooperative  (sleeping up on arrival but easily aroused)   Attention Attends with cues to redirect   Orientation Level Oriented X4  (Simultaneous filing  User may not have seen previous data )   Memory Decreased recall of recent events;Decreased short term memory   Following Commands Follows one step commands with increased time or repetition  (tangenetial / loquacious)   Comments Identified pt by full name and birthdate   Alert and generally oriented  Appropriate long term recall but demonstrated limited insight into deficits  Able to provide home set- up and communicate wants / needs  Recommend ongoin eval of functional cognitive skills to assist in DC Planning   Assessment   Limitation Decreased ADL status; Decreased cognition;Decreased endurance;Decreased Safe judgement during ADL;Decreased self-care trans;Decreased high-level ADLs  (generalized weakness / deconditioning)   Assessment Pt is a 66yo female [facility]  Pt presented w/ hematemesis and bright red blood from rectum  Significant PMH Impacting her occupational performance includes gastric bypass ([place]-en-[facility]), Bipolar disorder, anxiety, fibromyalgia, B carpal tunnel release, osteoarthritis  Pt [facility] 5/28/22  Personal factors impacting performance includes lives alone, advanced age, multi level home environment, and recent admission  Pt w/ active OT orders and activity orders  Pt reports living alone in 2 [facility] w/ 2 WILLI PTA  Pt reports I w/ ADL/ IADL w/ out use of AD or DME  Pt added that her brother in law assists w/ shopping as pt needs to get a new license  Upon eval, pt alert and oriented to person, place, and situation  Able to communicate wants / needs and follow directions w/ cues to sustain attention  Pt required S to complete bed mobility and sit <> stand from EOB  Pt required min A w/ out use of AD for short distance functional mobility  Pt required min A to complete LBD and S to complete UBD  Pt required S to complete grooming  Pt declined OOB to chair reporting fatigue  Pt presents w/ decreased activity tolerance, decreased endurance,decreased cognition, limited insight into deficits, decreased standing balance impacting her I w/ dressing, bathing, oral hygiene, functional transfers, activity engagement, clothing mgmt, food prep / clean up, community mobility   Pt completing ADL below baseline level of I and would benefit from OT while in acute care to address deficits  Recommend post acute rehab vs home when medically stable w/ Fitness to Drive assessment pend improved activity tolerance, functional mobility w/ mod I using AD as needed, increased assistance / support at [facility]  Will continue to follow   Goals   Patient Goals Pt stated that she hopes to get a real meal tonight   Plan   Treatment Interventions ADL retraining;Functional transfer training; Endurance training;Patient/family training;Equipment evaluation/education; Compensatory technique education;Continued evaluation; Energy conservation; Activityengagement   Goal Expiration Date 06/23/22   OT Frequency 3-5x/wk   Additional Treatment Session   Start Time 1521   End Time 1540   Treatment Assessment Pt seen for skilled OT tx session day 1 following eval focusing on continued evaluation  Pt engaged in [facility] w/ score of 12/30 indicating mod cognitive impairment  Recommend ongoing eval of functional cognitive skills and Fitness to Drive assessment prior to return to driving  Pt may beneift from assistance / supervision w/ complex tasks and reports that her family can asisst  Pt demonstrated impairmed memory / recall despite cues or multiple choice  Required cues to sustain attention and follow directions  Please see below for details of [facility] RE[facility]ATION  Continue to recommend post acute rehab when medically stable for DC from acute care at this time pend improved activity tolerance and increased support / assist at [facility] as pt lives alone   Will continue to follow   Additional Treatment Day 1  (Monday)   Recommendation   OT Discharge Recommendation Post acute rehabilitation services  (vs return home w/ increased support / assist w/ IADLs, community mobility, and improved activity tolerance w/ OPOT (including Fitness to Drive))   Equipment Recommended Shower/Tub chair with back ($)   AM-PAC Daily Activity Inpatient   Lower Body Dressing 3   Bathing 2   Toileting 3   Upper Body Dressing 3   Grooming 4   Eating 4   Daily Activity Raw Score 19   Daily Activity Standardized Score (Calc for Raw Score >=11) 40 22   AM-PAC Applied Cognition Inpatient   Following a Speech/Presentation 2   Understanding Ordinary Conversation 3   Taking Medications 2   Remembering Where Things Are Placed or Put Away 2   Remembering List of 4-5 Errands 1   Taking Care of Complicated Tasks 1   Applied Cognition Raw Score 11   Applied Cognition Standardized Score 27 03   Barthel Index   Feeding 5   Bathing 0   Grooming Score 5   Dressing Score 5   Bladder Score 10   Bowels Score 10   Toilet Use Score 5   Transfers (Bed/Chair) Score 10   Mobility (Level Surface) Score 0   Stairs Score 0   Barthel Index Score 50   Modified Brandywine Scale   Modified Brandywine Scale 4        The patient's raw score on the AM-PAC Daily Activity inpatient short form is 19, standardized score is 40 22, greater than 39 4  Patients at this level are likely to benefit from discharge to home  Please refer to the recommendation of the Occupational Therapist for safe discharge planning  OT DC recommendation does not coincide w/ [facility] score  Recommend DC home w/ Home OT and increased support / assist vs rehab pend improved activity tolerance       Pt goals to be met by 6/23/22:  -Pt will demonstrate good attention and participation in continued evaluation to assess functional cognitive skills to assist in DC planning    -Pt will consistently follow 2 step directions during ADLs w/ no more than 1 cue / prompt to improve engagement and return home alone    -Pt will complete bed mobility supine <> sit w/ mod I to max I w/ ADLs and return home alone    -Pt will complete functional transfers to bed, chair, and toilet using AD, DME as needed w/ S to max I and improve engagement    -Pt will complete UBD/LBD w/ mod I for set- up w/ no more than 1 rest break to improve engagement    -Pt will demonstrate improved functional standing tolerance for at least 10 minutes using AD as needed w/ at least fair + balance to max I w/ food prep and functional mobility to return home alone    -Pt will consistently engage in functional mobility to / from bathroom w/ S using AD as needed    -Pt will demonstrate improved activity / sitting tolerance OOB in chair for all meals    -Pt will demonstrate good attention and understanding EC tech to max I w/ ADLs and improve engagement      Shane Cognitive Assessment  (Please refer to MR for scanned copy of paper assessment)     Visuospatial/Executive 0/5  -alternating trail makin points  -visuoconstructional skills (copy 3D cube): 0 points  -Draw clock: 0 points     Initially able to connect number to letter (1--> A-->2-->B) but unable to complete  Pt stated "I messed up" but unable to correct  Unable to draw a clock w/ time ten past eleven  Perseverated on drawing a line at 12 o'clock  Naming 3/3     Memory (immediate recall)no points for this section however immediate recall was 5/5 words  Again, no points are given for this section  On 2nd trial able to recall 5/5 words    Attention  -digit span: 2/2  -vigilance: 0/1  -serial 7 subtraction: 0/3   Able to repeat series of numbers forward and reverse  Pt demonstrated difficulty attending to series of letter to tap her finger when heard 'A'  Unable to subtract 7 from 100 and stated that she is not good at math      Language  Sentence repitition: 1/2  Verbal fluency: 0/1     Abstraction 1/2     Delayed recall  0/5 words recalled with NO cue  0/5 words recalled with CATEGORY cue  Memory index score (MIS) 1/15     Orientation 5/6     TOTAL SCORE   (normal >/= )      Flory Fernandes, OTR/L

## 2022-06-13 NOTE — UTILIZATION REVIEW
Initial Clinical Review    Admission: Date/Time/Statement:   Admission Orders (From admission, onward)     Ordered        06/11/22 1941  Inpatient Admission  Once                      Orders Placed This Encounter   Procedures    Inpatient Admission     Standing Status:   Standing     Number of Occurrences:   1     Order Specific Question:   Level of Care     Answer:   Level 1 Stepdown [13]     Order Specific Question:   Estimated length of stay     Answer:   More than 2 Midnights     Order Specific Question:   Certification     Answer:   I certify that inpatient services are medically necessary for this patient for a duration of greater than two midnights  See H&P and MD Progress Notes for additional information about the patient's course of treatment  ED Arrival Information     Expected   -    Arrival   6/11/2022 16:40    Acuity   Emergent            Means of arrival   Ambulance    Escorted by   [facility]    Service   Hospitalist    Admission type   Emergency            Arrival complaint   EMS           Chief Complaint   Patient presents with    Hemoptysis     +dizziness       Initial Presentation: 64 y o  female emergently to ED by EMS as Inpatient admission ICU for evaluation & treatment of acute blood loss anemia, Hematemesis, BRBPR, NOAH    PMH  Savage-en-Y gastric bypass, recent admission for symptomatic anemia, EGD with large ulceration without active bleeding, hypothyroidism, and protein calorie malnutrition presents w new onset hematemesis & BRBPR reports poor PO intake, loss of appetite & not taken prescribed vitamin supplementation in some time or a BM in 2 wks  EXAM  BRBPR, cachectic; tachypnea, winded w speaking but recovers  Hypotensive  labs w HGB 4 4, increase BUN/NOAH    PLAN   ICU, s/p 2 U pRBC in ED; trend H&H Q4; transfuse for HGB<7; per consult GI volume resuscitate w possible EGD in am if becomes unstable emergent EGD  IV Protonix gtt, IV Isolyte, NPO   AM BMP, avoid hypotension/ nephrotoxins  Hold home Levothyroxine once Ok for PO   Date:6/12/2022   Day 2:   Critical Care 2 BM overnight for which crimson red blood; 2 large handful amt; SBP dropped to 70s  IV PPI, EGD this am; H&H Q 4; TX for HGB>7 5  NOAH resolved w resuscitation; replace & monitor electrolytes  GI  Pale may have ongoing or intermittent bleeding from anastomotic ulcer; cont supportive care w fluids, Bld TX for HGB>7, remains seriously ill, plans for urgent endoscopy  ASA pre procedure 3 emergent     Date: 6/13/2022 DAY #3  Critical care:   Slept poorly, lungs clear good POX  S/P 4U pRBC, UGIB secondary to Gastric ulcer s/p EGD w clip/ EPI, HX of gastric bypass/ Protein calorie malnutrition; Protonix x72 HR; then BID dosing, Tolerating clears  Nutrition eval adv diet  May transfer to Med surg  Geriatric Medicine:    Acute blood loss anemia; cont IV Protonix s/p TX  On Cl Liq diet; HGB stable at 9 9 w management per Primary & GI  Dietitian follow appreciate rec & OP follow up for repeat labs for Levothyroxine  Concern for medical non compliance to antianxiety meds; on Klonopin & Trazodone  Neuro physche consult patient deemed to not have capacity to make decisions; likely need placement on DC pt lives alone  [facility] Physician  Results of Neuropsychological Exam revealed diffuse cognitive dysfunction and on a measure assessing awareness of personal health status and ability to evaluate health problems, handle medical emergencies and take safety precautions, patient performed in the IMPAIRED range of functioning  At this time, patient does not appear to have capacity to make fully informed medical decisions  Unspecified Neurocognitive Disorder    ED Triage Vitals   Temperature Pulse Respirations Blood Pressure SpO2   06/11/22 1706 06/11/22 1700 06/11/22 1700 06/11/22 1700 06/11/22 1700   98 1 °F (36 7 °C) 92 22 90/50 100 %      Temp Source Heart Rate Source Patient Position - Orthostatic VS BP Location FiO2 (%)   06/11/22 1930 06/11/22 1700 06/11/22 1700 06/11/22 1700 --   Oral Monitor Lying Right arm       Pain Score       06/11/22 2055       No Pain          Wt Readings from Last 1 Encounters:   06/13/22 50 4 kg (111 lb 1 8 oz)     Additional Vital Signs:   Date/Time Temp Pulse Resp BP MAP (mmHg) SpO2 Calculated FIO2 (%) - Nasal Cannula Nasal Cannula O2 Flow Rate (L/min) O2 Device Cardiac (WDL) Patient Position - Orthostatic VS   06/13/22 1100 98 °F (36 7 °C) 76 19 100/51 71 97 % -- -- None (Room air) -- Lying   06/13/22 0700 -- 81 22 114/59 81 96 % -- -- -- -- --   06/13/22 0657 100 3 °F (37 9 °C) 82 20 114/59 81 96 % -- -- None (Room air) -- Lying   06/13/22 0400 98 2 °F (36 8 °C) 72 16 117/58 -- 97 % -- -- -- -- Lying   06/13/22 0000 98 4 °F (36 9 °C) 75 16 113/58 -- 97 % -- -- -- -- Lying   06/12/22 2000 98 °F (36 7 °C) 89 18 128/62 -- 98 % -- -- -- -- Lying   06/12/22 1551 98 1 °F (36 7 °C) 73 16 101/55 75 96 % -- -- None (Room air) -- Lying   06/12/22 1145 97 6 °F (36 4 °C) 79 22 125/60 86 96 % -- -- None (Room air) -- Lying   06/12/22 1126 98 3 °F (36 8 °C) 81 20 112/60 -- 97 % -- -- None (Room air) -- --   06/12/22 1120 98 6 °F (37 °C) 84 20 112/54 -- 96 % -- -- None (Room air) -- --   06/12/22 1113 97 5 °F (36 4 °C) 85 20 118/56 -- 97 % -- -- None (Room air) WDL --   06/12/22 0928 97 4 °F (36 3 °C) Abnormal  75 20 101/56 -- 99 % -- -- None (Room air) -- --   06/12/22 0855 -- 73 29 Abnormal  100/58 -- 98 % -- -- None (Room air) -- --   06/12/22 0700 98 2 °F (36 8 °C) 78 18 102/58 -- 99 % -- -- -- -- --   06/12/22 0630 98 °F (36 7 °C) 74 20 99/56 -- 98 % -- -- None (Room air) -- --   06/12/22 0625 98 5 °F (36 9 °C) 75 20 96/54 -- 98 % -- -- None (Room air) -- --   06/12/22 0618 98 5 °F (36 9 °C) 77 33 Abnormal  95/50 -- -- -- -- -- -- --   06/12/22 0530 -- 75 20 78/52 Abnormal  60 97 % -- -- -- -- --   06/12/22 0500 -- 76 20 93/55 71 97 % -- -- -- -- --   06/12/22 0400 98 °F (36 7 °C) 74 20 97/61 73 97 % -- -- -- -- --   06/12/22 0200 98 °F (36 7 °C) 78 20 105/56 77 100 % -- -- None (Room air) -- Lying   06/12/22 0100 -- 76 18 92/55 72 99 % -- -- -- -- --   06/12/22 0000 -- 76 16 80/56 Abnormal  61 100 % -- -- -- -- --   06/11/22 2330 98 °F (36 7 °C) 77 20 110/55 -- 100 % 28 2 L/min Nasal cannula -- --   06/11/22 2300 97 8 °F (36 6 °C) 79 20 105/46 Abnormal  -- 100 % 28 2 L/min Nasal cannula -- --   06/11/22 2230 97 8 °F (36 6 °C) 76 22 98/55 -- 100 % -- -- -- -- --   06/11/22 2200 98 °F (36 7 °C) 75 20 88/52 Abnormal  -- 100 % 28 2 L/min Nasal cannula -- --   06/11/22 2130 98 °F (36 7 °C) 80 20 110/53 -- 100 % 32 3 L/min Nasal cannula -- --   06/11/22 2105 97 8 °F (36 6 °C) 72 20 89/54 Abnormal  -- 100 % 32 3 L/min Nasal cannula -- --   06/11/22 2055 97 8 °F (36 6 °C) 71 20 92/50 -- 100 % 32 3 L/min Nasal cannula -- --   06/11/22 2049 97 8 °F (36 6 °C) 69 27 Abnormal  92/50 -- -- -- -- -- -- --   06/11/22 2028 97 8 °F (36 6 °C) 76 20 109/54 77 100 % 32 3 L/min Nasal cannula -- Lying   06/11/22 1930 97 8 °F (36 6 °C) 80 18 96/51 69 98 % 40 5 L/min Nasal cannula -- Lying   06/11/22 1900 97 5 °F (36 4 °C) 78 18 86/48 Abnormal  65 100 % 40 5 L/min Nasal cannula -- Lying   06/11/22 1825 97 4 °F (36 3 °C) Abnormal  80 16 87/50 Abnormal  -- -- -- -- -- -- --   06/11/22 1800 -- 84 20 92/52 -- 99 % 40 5 L/min Nasal cannula -- Lying   06/11/22 1706 98 1 °F (36 7 °C) -- -- -- -- 100 % 40 5 L/min -- -- --   06/11/22 1703 -- 92 24 Abnormal  90/50 -- 86 % Abnormal  40 5 L/min Nasal cannula -- Sitting   06/11/22 1700 -- 92 22 90/50 62 100 % 40 5 L/min Nasal cannula -- Lying       Weights (last 14 days)    Date/Time Weight Weight Method Height   06/13/22 0558 50 4 kg (111 lb 1 8 oz) Bed scale --   06/11/22 2055 50 9 kg (112 lb 3 4 oz) Bed scale 5' 4" (1 626 m)   06/11/22 2018 50 9 kg (112 lb 3 4 oz) Bed scale --       Pertinent Labs/Diagnostic Test Results:   6/12/2022  IMPRESSION:  1  A large ulcer in the gastric pouch had the gastroenteric anastomosis, large adherent blood clot in the base of the ulcer  This was washed off, small area was left  Epinephrine injected in the base of the ulcer around the adherent clot thereafter 2 Endoclips were placed  No active bleeding was seen  The small bowel loop was normal     RECOMMENDATION:  Schedule repeat EGD in 3 months  Continue monitoring H&H  Take PPI twice a day  Avoid aspirin and NSAIDs  Patient has a very deep large ulcer, have significant probability of recurrent bleeding and or delayed healing    No orders to display         Results from last 7 days   Lab Units 06/13/22  0550 06/12/22  2026 06/12/22  1339 06/12/22  0507 06/12/22  0102 06/11/22  1704   WBC Thousand/uL 12 71*  --   --  10 24*  --  13 08*   HEMOGLOBIN g/dL 9 9* 10 2* 10 2* 5 9* 7 3* 4 4*   HEMATOCRIT % 28 8* 29 5* 29 6* 17 2* 21 7* 14 9*   PLATELETS Thousands/uL 407*  --   --  460*  --  635*   NEUTROS ABS Thousands/µL 8 78*  --   --  5 88  --   --    BANDS PCT %  --   --   --   --   --  3         Results from last 7 days   Lab Units 06/13/22  0550 06/12/22  0507 06/11/22  1704   SODIUM mmol/L 136 136 133*   POTASSIUM mmol/L 3 7 4 0 4 4   CHLORIDE mmol/L 109* 106 105   CO2 mmol/L 21 16* 18*   ANION GAP mmol/L 6 14* 10   BUN mg/dL 20 27* 25   CREATININE mg/dL 1 16 0 96 1 39*   EGFR ml/min/1 73sq m 45 56 36   CALCIUM mg/dL 7 6* 6 0* 7 4*   MAGNESIUM mg/dL 2 1 2 3  --    PHOSPHORUS mg/dL 2 7 2 7  --      Results from last 7 days   Lab Units 06/13/22  0550 06/12/22  0507 06/11/22  1704   AST U/L 17 12* 18   ALT U/L 9 7 8   ALK PHOS U/L 42 30* 40   TOTAL PROTEIN g/dL 5 4* 4 3* 5 4*   ALBUMIN g/dL 2 4* 1 9* 2 3*   TOTAL BILIRUBIN mg/dL 0 75 1 09* 0 39     Results from last 7 days   Lab Units 06/12/22  1910   POC GLUCOSE mg/dl 101     Results from last 7 days   Lab Units 06/13/22  0550 06/12/22  0507 06/11/22  1704   GLUCOSE RANDOM mg/dL 86 69 119             No results found for: BETA-HYDROXYBUTYRATE Results from last 7 days   Lab Units 06/13/22  0550 06/12/22  1752 06/12/22  0507   PROTIME seconds 13 7 14 4 17 8*   INR  1 05 1 12 1 48*             Results from last 7 days   Lab Units 06/12/22  0919   LACTIC ACID mmol/L 0 5     6/11 EKG NSR     ED Treatment:   Medication Administration from 06/11/2022 1640 to 06/11/2022 2016       Date/Time Order Dose Route Action     06/11/2022 1735 pantoprazole (PROTONIX) 80 mg in sodium chloride 0 9 % 100 mL IVPB 80 mg Intravenous New Bag     06/11/2022 1802 pantoprazole (PROTONIX) 80 mg in sodium chloride 0 9 % 100 mL infusion 8 mg/hr Intravenous New Bag     06/11/2022 1735 ondansetron (ZOFRAN) injection 4 mg 4 mg Intravenous Given        Past Medical History:   Diagnosis Date    Anemia     Anxiety     [facility] disorder ([facility] )     Colon polyp     Disease of thyroid gland     Essential hypertension     Essential tremor     Fibromyalgia, primary     GERD (gastroesophageal reflux disease)     Inflammatory polyarthropathy (HCC)     Mammogram abnormal      Present on Admission:   BRBPR (bright red blood per rectum)   Acute blood loss anemia   NOAH (acute kidney injury) ([facility] )   Gastric ulcer   Severe protein-calorie malnutrition (HCC)      Admitting Diagnosis: Hematemesis [K92 0]  Acute blood loss anemia [D62]  Anemia [D64 9]  Hypotension [I95 9]  BRBPR (bright red blood per rectum) [K62 5]  Hemoptysis [R04 2]  Age/Sex: 64 y o  female  Admission Orders:  Continuous cardiopulmonary monitoring  neuro checks  Daily  I/o  Cl Liq diet  SCD    Scheduled Medications:  clonazePAM, 1 5 mg, Oral, HS  levothyroxine, 112 mcg, Oral, Early Morning  traZODone, 50 mg, Oral, HS      Continuous IV Infusions:  pantoprozole (PROTONIX) infusion (Continuous), 8 mg/hr, Intravenous, Continuous  multi-electrolyte (PLASMALYTE-A/ISOLYTE-S PH 7 4) IV solution 6/11 2043 & DC 6/13 0835  Rate: 75 mL/hr Dose: 75 mL/hr  Freq: Continuous Route: IV    PRN Meds:  ondansetron, 4 mg, Intravenous, Q6H PRN    IP CONSULT TO CASE MANAGEMENT  IP CONSULT TO GASTROENTEROLOGY  IP CONSULT TO GERONTOLOGY  IP CONSULT TO NEUROPSYCHOLOGY  IP CONSULT TO NUTRITION SERVICES  Network Utilization Review Department  ATTENTION: Please call with any questions or concerns to [phone] and carefully listen to the prompts so that you are directed to the right person  All voicemails are confidential   Adelia Hurd all requests for admission clinical reviews, approved or denied determinations and any other requests to dedicated fax number below belonging to the campus where the patient is receiving treatment   List of dedicated fax numbers for the Facilities:  [address] DENIALS (Administrative/Medical Necessity) [phone]   [address] (Maternity/NICU/Pediatrics) [phone]   [address]  [address] [place] [address] [address] [place] [address] [address] [place] [address] [phone]

## 2022-06-13 NOTE — MALNUTRITION/BMI
This medical record reflects one or more clinical indicators suggestive of malnutrition and/or morbid obesity  Malnutrition Findings:   Adult Malnutrition type: Chronic illness  Adult Degree of Malnutrition: Other severe protein calorie malnutrition  Malnutrition Characteristics: Muscle loss, Weight loss, Fat loss                360 Statement: Severe malnutrition r/t inadequate intake as evidenced by 13% weight loss over 4 months, orbitals hollow, and muscle wasting present to temples; nutritional supplements ordered once diet advances    BMI Findings: Body mass index is 19 07 kg/m²  See Nutrition note dated 6/13/2022 for additional details  Completed nutrition assessment is viewable in the nutrition documentation

## 2022-06-13 NOTE — CONSULTS
Consultation - Neuropsychology/Psychology Department  Christiano Montemayor 64 y o  female MRN: [id number]  Unit/Bed#: ICU 03 Encounter: [id number]        Reason for Consultation:  Christiano Montemayor is a 64y o  year old female who was referred for a Neuropsychological Exam to assess cognitive functioning and comment on capacity to make informed medical decisions        History of Present Illness  Hematemesis and BRBPR    Physician Requesting Consult: Zoraida Singh*    PROBLEM LIST:  Patient Active Problem List   Diagnosis    Hypothyroidism    Gastroesophageal reflux disease without esophagitis    Mixed anxiety depressive disorder    Fibromyalgia, primary    Iron deficiency    Numbness of foot    Dysphasia    Epigastric pain    COVID-19    Acute bronchitis    Shortness of breath    Medicare annual wellness visit, subsequent    Fibromyalgia syndrome    Osteopenia of both forearms    Cerumen debris on tympanic membrane of right ear    Nasal congestion    Bilateral hearing loss    Anemia    Dyspnea on exertion    Generalized weakness    Hyponatremia    Abnormal CT scan    H/O bariatric surgery - bypass    Severe protein-calorie malnutrition (HCC)    Fever    Gastric ulcer    Hematemesis    BRBPR (bright red blood per rectum)    Acute blood loss anemia    NOAH (acute kidney injury) ([facility] )    Leukocytosis         Historical Information   Past Medical History:   Diagnosis Date    Anemia     Anxiety     Bipolar disorder (HCC)     Colon polyp     Disease of thyroid gland     Essential hypertension     Essential tremor     Fibromyalgia, primary     GERD (gastroesophageal reflux disease)     Inflammatory polyarthropathy (HCC)     Mammogram abnormal      Past Surgical History:   Procedure Laterality Date    BREAST BIOPSY Right 2015    benign    CARPAL TUNNEL RELEASE Bilateral     COLONOSCOPY      EGD AND COLONOSCOPY  01/01/2014    GASTRIC BYPASS  07/01/2012    MAMMO NEEDLE LOCALIZATION RIGHT (ALL INC) Right 2012    MAMMO NEEDLE LOCALIZATION RIGHT (ALL INC) Right 2012    ULNAR NERVE TRANSPOSITION Right      Social History   Social History     Substance and Sexual Activity   Alcohol Use Yes    Alcohol/week: 4 0 standard drinks    Types: 4 Glasses of wine per week    Comment: rare     Social History     Substance and Sexual Activity   Drug Use Never     Social History     Tobacco Use   Smoking Status Former Smoker    Quit date:     Years since quittin 4   Smokeless Tobacco Never Used     Family History:   Family History   Problem Relation Age of Onset    No Known Problems Mother     No Known Problems Father     No Known Problems Sister     No Known Problems Maternal Grandmother     No Known Problems Maternal Grandfather     No Known Problems Paternal Grandmother     No Known Problems Paternal Grandfather     No Known Problems Sister     No Known Problems Sister     Stomach cancer Maternal Aunt     No Known Problems Maternal Aunt     No Known Problems Maternal Aunt     No Known Problems Maternal Aunt     No Known Problems Paternal Aunt     Leukemia Other        Meds/Allergies   current meds:   Current Facility-Administered Medications   Medication Dose Route Frequency    clonazePAM (KlonoPIN) tablet 1 5 mg  1 5 mg Oral HS    levothyroxine tablet 112 mcg  112 mcg Oral Early Morning    ondansetron (ZOFRAN) injection 4 mg  4 mg Intravenous Q6H PRN    pantoprazole (PROTONIX) 80 mg in sodium chloride 0 9 % 100 mL infusion  8 mg/hr Intravenous Continuous    traZODone (DESYREL) tablet 50 mg  50 mg Oral HS       No Known Allergies      Family and Social Support:   Living Arrangements: Lives Alone  Support Systems: Family members  Assistance Needed: None  Type of Current Residence: Private residence  Current Home Care Services: No      Behavioral Observations: Alert, UNABLE to accurately state the year, day/month, day/week and name of city; patient appeared to have difficulty expressing thoughts, reported medical history as "very little" and does not know what she is being treated for in hospital; denied depressed mood and anixety    Cognitive Examination    General Cognitive Functioning MMSE = Impaired 15/28; Attention/Concentration Auditory Selective Attention = Average; Auditory Vigilance = Impaired; Information Processing Speed = Within Normal Limits    Frontal Systems/Executive Functioning Mental Flexibility/Cognitive Control = Impaired; Working Memory = Impaired Abstract Reasoning = Impaired;Generative Ability = Impaired,     Language Functioning Confrontation naming = Within Normal Limits, Phonemic Fluency = Impaired; Semantic Retrieval = Impaired; Comprehension of Complex Ideational Material = Impaired; Repetition = Within Normal Limits; Basic Reading = Within Normal Limits;     Memory Functioning Narrative Recall - Short Delay = Impaired; Long Delay Narrative Recall = Impaired;     Visuo-Spatial Abilities Not Assessed    Functional Knowledge  Health & Safety Knowledge = Impaired;     Summary/Impression:  Results of Neuropsychological Exam revealed diffuse cognitive dysfunction and on a measure assessing awareness of personal health status and ability to evaluate health problems, handle medical emergencies and take safety precautions, patient performed in the IMPAIRED range of functioning  At this time, patient does not appear to have capacity to make fully informed medical decisions  Unspecified Neurocognitive Disorder

## 2022-06-13 NOTE — PROGRESS NOTES
Progress Note - Jalil Flores 64 y o  female MRN: [id number]    Unit/Bed#: ICU 03 Encounter: [id number]        Subjective:   Patient says she has not been able to sleep well  Otherwise no bowel movements or melena this morning, denies any vomiting last night or this morning  Objective:     Vitals: Blood pressure 100/51, pulse 76, temperature 98 °F (36 7 °C), temperature source Oral, resp  rate 19, height 5' 4" (1 626 m), weight 50 4 kg (111 lb 1 8 oz), SpO2 97 %  ,Body mass index is 19 07 kg/m²  Intake/Output Summary (Last 24 hours) at 6/13/2022 1339  Last data filed at 6/13/2022 1000  Gross per 24 hour   Intake 1810 ml   Output 400 ml   Net 1410 ml       Physical Exam:   General appearance: alert, appears stated age and cooperative  Lungs: clear to auscultation bilaterally, no labored breathing/accessory muscle use  Heart: regular rate and rhythm, S1, S2 normal, no murmur, click, rub or gallop  Abdomen: soft, non-tender; bowel sounds normal; no masses,  no organomegaly  Extremities: no edema    Invasive Devices  Report    Peripheral Intravenous Line  Duration           Peripheral IV 06/11/22 Right Antecubital 1 day    Peripheral IV 06/12/22 Right;Ventral (anterior) Forearm <1 day                Lab, Imaging and other studies: I have personally reviewed pertinent reports  No results displayed because visit has over 200 results           Latest Reference Range & Units 06/12/22 20:26 06/13/22 05:50   Sodium 135 - 147 mmol/L  136   Potassium 3 5 - 5 3 mmol/L  3 7   Chloride 96 - 108 mmol/L  109 (H)   CO2 21 - 32 mmol/L  21   Anion Gap 4 - 13 mmol/L  6   BUN 5 - 25 mg/dL  20   Creatinine 0 60 - 1 30 mg/dL  1 16 [1]   Glucose, Random 65 - 140 mg/dL  86 [2]   Calcium 8 4 - 10 2 mg/dL  7 6 (L)   CORRECTED CALCIUM 8 3 - 10 1 mg/dL  8 9   AST 13 - 39 U/L  17 [3]   ALT 7 - 52 U/L  9 [4]   Alkaline Phosphatase 34 - 104 U/L  42   Total Protein 6 4 - 8 4 g/dL  5 4 (L)   Albumin 3 5 - 5 0 g/dL  2 4 (L)   TOTAL BILIRUBIN 0 20 - 1 00 mg/dL  0 75   eGFR ml/min/1 73sq m  45   Phosphorus 2 3 - 4 1 mg/dL  2 7   Magnesium 1 9 - 2 7 mg/dL  2 1   WBC 4 31 - 10 16 Thousand/uL  12 71 (H)   Red Blood Cell Count 3 81 - 5 12 Million/uL  3 30 (L)   Hemoglobin 11 5 - 15 4 g/dL 10 2 (L) 9 9 (L)   HCT 34 8 - 46 1 % 29 5 (L) 28 8 (L)   MCV 82 - 98 fL  87   MCH 26 8 - 34 3 pg  30 0   MCHC 31 4 - 37 4 g/dL  34 4   RDW 11 6 - 15 1 %  19 2 (H)   Platelet Count 314 - 390 Thousands/uL  407 (H)   MPV 8 9 - 12 7 fL  10 3   nRBC /100 WBCs  0   Neutrophils % 43 - 75 %  69   Immat GRANS % 0 - 2 %  2   Lymphocytes Relative 14 - 44 %  14   Monocytes Relative 4 - 12 %  10   Eosinophils 0 - 6 %  5   Basophils Relative 0 - 1 %  0   Immature Grans Absolute 0 00 - 0 20 Thousand/uL  0 20   Absolute Neutrophils 1 85 - 7 62 Thousands/µL  8 78 (H)   Lymphocytes Absolute 0 60 - 4 47 Thousands/µL  1 79   Absolute Monocytes 0 17 - 1 22 Thousand/µL  1 28 (H)   Absolute Eosinophils 0 00 - 0 61 Thousand/µL  0 63 (H)   Basophils Absolute 0 00 - 0 10 Thousands/µL  0 03   PROTIME 11 6 - 14 5 seconds  13 7   POCT INR 0 84 - 1 19   1 05   (H): Data is abnormally high  (L): Data is abnormally low  [1] Standardized to IDMS reference method  [2] If the patient is fasting, the ADA then defines impaired fasting glucose as > 100 mg/dL and diabetes as > or equal to 123 mg/dL  Specimen collection should occur prior to Sulfasalazine administration due to the potential for falsely depressed results  Specimen collection should occur prior to Sulfapyridine administration due to the potential for falsely elevated results  [3] Specimen collection should occur prior to Sulfasalazine administration due to the potential for falsely depressed results  [4] Specimen collection should occur prior to Sulfasalazine administration due to the potential for falsely depressed results  Assessment/Plan:    1   Upper GI bleed secondary to gastric ulcer, status post EGD yesterday, treated with Endoclip placement, does not appear to be showing signs of active GI bleeding at this time    -may gradually advance diet as tolerated    -continue PPI drip for 72 hour total then transition to twice daily dosing    -monitor hemoglobin closely, transfuse as needed    -plan to reschedule if patient does show signs of recurrent upper GI bleeding    - avoid NSAIDs    -otherwise plan for outpatient EGD in 3 months to confirm healing and check biopsies; treat with twice daily PPI in the meantime

## 2022-06-13 NOTE — PLAN OF CARE
Problem: OCCUPATIONAL THERAPY ADULT  Goal: Performs self-care activities at highest level of function for planned discharge setting  See evaluation for individualized goals  Description: Treatment Interventions: ADL retraining, Functional transfer training, Endurance training, Patient/family training, Equipment evaluation/education, Compensatory technique education, Continued evaluation, Energy conservation, Activityengagement  Equipment Recommended: Shower/Tub chair with back ($)       See flowsheet documentation for full assessment, interventions and recommendations  Note: Limitation: Decreased ADL status, Decreased cognition, Decreased endurance, Decreased Safe judgement during ADL, Decreased self-care trans, Decreased high-level ADLs (generalized weakness / deconditioning)     Assessment: Pt is a 66yo female admitted to [facility] on 6/11/22  Pt presented w/ hematemesis and bright red blood from rectum  Significant PMH Impacting her occupational performance includes gastric bypass (Savage-en-Y), Bipolar disorder, anxiety, fibromyalgia, B carpal tunnel release, osteoarthritis  Pt recently admitted to [facility] and [facility] won 5/28/22  Personal factors impacting performance includes lives alone, advanced age, multi level home environment, and recent admission  Pt w/ active OT orders and activity orders  Pt reports living alone in [address] [place] [address] w/ 2 WILLI PTA  Pt reports I w/ ADL/ IADL w/ out use of AD or DME  Pt added that her brother in law assists w/ shopping as pt needs to get a new license  Upon eval, pt alert and oriented to person, place, and situation  Able to communicate wants / needs and follow directions w/ cues to sustain attention  Pt required S to complete bed mobility and sit <> stand from EOB  Pt required min A w/ out use of AD for short distance functional mobility  Pt required min A to complete LBD and S to complete UBD  Pt required S to complete grooming  Pt declined OOB to chair reporting fatigue   Pt presents w/ decreased activity tolerance, decreased endurance,decreased cognition, limited insight into deficits, decreased standing balance impacting her I w/ dressing, bathing, oral hygiene, functional transfers, activity engagement, clothing mgmt, food prep / clean up, community mobility  Pt completing ADL below baseline level of I and would benefit from OT while in acute care to address deficits  Recommend post acute rehab vs home when medically stable w/ Fitness [address] assessment pend improved activity tolerance, functional mobility w/ mod I using AD as needed, increased assistance / support at PLOF   Will continue to follow     OT Discharge Recommendation: Post acute rehabilitation services (vs return home w/ increased support / assist w/ IADLs, community mobility, and improved activity tolerance w/ OPOT (including Fitness to [address]))

## 2022-06-13 NOTE — PROGRESS NOTES
Transfer Note - [address] [employer] R Camacho 64 y o  female MRN: [id number]  Unit/Bed#: ICU 03 Encounter: [id number]    Acute Blood Loss Anemia:  · PMHX Gastric ulcers   · (06/11):  POA Acute blood loss anemia with an Hgb of 4 4 in ED given 2 units pRBC's  · (05/24): Admitted previously for symptomatic anemia;  EGD from that admission showed large ulcer (near the anastomotic site and distal gastric remnant) that was not bleeding  · Discharged with Hgb of 8 3     · Not on any AC/AP at home  · (06/11): In ED, given 2 units PRBC and started on IV Protonix + IVF (Isolyte)  · BP initially 80s/50s, HR 70s-80s, following first unit BP 96/51  · Not on AV carina blockers  · EKG Sinus rhythm  · GI consulted, appreciate recommendations -- plan to volume resuscitate / transfuse overnight and possible EGD in am  · Continued to trend H/H q4h   · Transfuse for Hgb < 7 or active bleeding  · Continued Protonix gtt  · NPO for EGD  · (06/12): Remained NPO until EGD  · EGD:   large ulcer in the gastric pouch had the gastroenteric anastomosis, large adherent blood clot in the base of the ulcer; area washed off, small area was left; epinephrine injected in the base of the ulcer around the adherent clot thereafter 2 endoclips were placed  No active bleeding was seen  · GI recommendation:    · Continue Protonix gtt  · Advance diet to clears (without caffeine)  · Zofran for Nausea prn  · (06/13): S/p EGD and 4 blood transfusions  · H/H: 9 9/28 8,  10 2/29 5,  10 2/29 6  · Stable, not complaining of abdominal pain  · Transfer to Med/surg (SLIM)  · Advance diet as tolerated      Leukocytosis  · (06/13):   WBC elevated to 12 71   · Neutrophil predominant with monocytes and eosinophils  · T-max 100 3 F  · No steroid given during admission  · Differential includes reactive leukocytosis from procedure, acute infectious process (respiratory, urinary, GI)  · Pt denies any urinary symptoms, respiratory/GI symptoms this AM  · Continue to monitor  · Consider obtaining blood/urine cultures prior to ABx administration (if going to order)      Hematemesis  · (06/11): Patient reports "fresh blood" emesis at home x1   · Plan as above    BRBPR (bright red blood per rectum)  · (06/11-12):  POA: noted in ER  · Denies previous bloody or tarry stools  States that she has not had BM in two weeks  Denies abdominal pain, distension  · Patient with previous known gastric ulcers  · Plan as above    Gastric ulcer  · (05/24): Recent admission for symptomatic anemia  EGD during that admission with noted ulcer, no active bleeding  Patient was to have follow up EGD and to continue PPI and Carafate  · EGD 5/26: Single large, deep, irregular, benign-appearing ulcer in the gastric pouch and gastrojejunal anastomosis; performed cold forceps biopsy  Large ulcer noted at the anastomosis/distal gastric pouch  Staples noted in this area  No active bleeding from the ulceration  The ulceration is large measuring at least 2 5 x 3 cm  · EGD (7/2021):  Signs of previous gastric bypass surgery in the stomach  History of savage en y gastric bypass  Mild erythematous mucosa in the body of the stomach and antrum; performed cold forceps biopsy  Biopsies obtained to rule out H  Pylori  The jejunum appeared normal  · EGD (9/2019):  Signs of previous gastric bypass surgery in the stomach  History of savage en y gastric bypass  Multiple cratered, linear, benign-appearing ulcers in the body of the stomach and anastomosis of the stomach  The jejunum appeared normal  · See above for current plan    Severe protein-calorie malnutrition (HCC)  Malnutrition Findings: BMI 19 07    -  Sunken orbits, protruding clavicles, loss of subcutaneous fat and muscle waisting  Patient states that she has not had an appetite for months and has stopped taking her vitamin supplements recommended in the setting of Savage-en-Y gastric bypass "sometime ago"      · Current diet is clears, and will advance diet as she tolerates and per GI recommendations  · Nutrition counseling  · Nutrition consult     BMI Findings: Body mass index is 20 52 kg/m²  H/O bariatric surgery - bypass  · Patient reports remote history of Savage-en-Y gastric bypass approximately 12 years ago, at [facility]   · She states she has not been taking vitamin supplementation for some time now  · Also reports loss of appetite and poor po intake  · Denies nausea and vomiting prior to today    NOAH (acute kidney injury) ([facility] [address] ) (Resolved)  · POA: 2/2 acute blood loss anemia/GIB, hypotension on presentation to ER  · Baseline creatinine 1  · BUN / Cr: 25 / 1 39, on recent discharge was BUN / Cr: 7 / 1 09  · Transfuse as above  · Start IVF with isolyte   · Continue to monitor UO and renal indices  · BMP in am  · Avoid hypotension/nephrotoxins    Hypothyroidism  Lab Results   Component Value Date    [id number] 3 492 05/24/2022     · Holding home levothyroxine in the setting of GIB   · Resume once okay to take PO per GI    Code Status: Level 1 - Full Code  POA:    POLST:      Reason for ICU admission:   Admitted to Step-Down 1 (SD-1) for symptomatic anemia requiring multiple blood transfusions, probable UGI with possibility for emergent Endoscopy  Active problems:   Principal Problem:    Acute blood loss anemia  Active Problems:    Hypothyroidism    H/O bariatric surgery - bypass    Severe protein-calorie malnutrition (HCC)    Gastric ulcer    Hematemesis    BRBPR (bright red blood per rectum)    NOAH (acute kidney injury) ([facility] [address] )  Resolved Problems:    * No resolved hospital problems  *      Consultants:   - GI  - Neuropsych  - Gerontology    History of Present Illness (06/11):   "Azael Seals is a 64 y o  female who presents with PMHx remote Savage-en-Y gastric bypass, recent admission for symptomatic anemia, EGD with large ulceration without active bleeding, hypothyroidism, and protein calorie malnutrition    She presents from home today with reported new onset hematemesis and bright red blood rectum  She denies previous gastric/abdominal pain, nausea, abdominal distension, and symptoms of reflux  She does however support poor p o  Intake, loss of appetite, she also states she has not taken vitamin supplementation in sometime is unsure if she took her Protonix/Carafate today  In the ER she was noted to have BRBPR, initial labs reveal hemoglobin 4 4, this is down from 8 3 from her previous hospitalization, and increase in BUN and creatinine from baseline  Will admit to Step Down Level 1 under [facility]  History obtained from chart review and the patient "    Summary of clinical course:   Pt admitted from ED for symptomatic anemia, hematemesis and BRBRPR  In ED given 2 units PRBC and then once admitted to ICU given 2 more units  Pt kept NPO on first night of admission per GI recommendations, and started on protonix drip  Pt taken for EGD next morning and found to have  Large ulcer in the gastric pouch  There was also a gastroenteric anastomosis with a large adherent blood clot in the base of the ulcer  This was washed off, and a small area was left  Then epinephrine was injected into the base of the ulcer around the adherent clot thereafter 2 Endoclips were placed  No active bleeding was seen  Per GI pt will need to have repeat EGD in 3 months, PPI's taken twice a day, and to continue avoiding aspirin and NSAIDs  GI also reported that since the patient has a very deep and large ulcer, there probability of recurrent bleeding and delayed healing is high  Patient's diet was advanced to clears and monitored into the next day  Patient's H&H was at 10 2 until 06/13 where her Hgb declined to 9 9  Patient was deemed stable for down grade to [facility] service and will continue to be followed by GI until discharge            Recent or scheduled procedures:   - EGD    Outstanding/pending diagnostics:   - EGD Biopsies     Cultures:   - None (may obtain blood and urine cultures today)       Mobilization Plan:   - PT   - OT    Nutrition Plan:   F/u with Nutrition     VTE Pharmacologic Prophylaxis: Pharmacologic VTE Prophylaxis contraindicated due to UGI  VTE Mechanical Prophylaxis: sequential compression device    Discharge Plan:   Patient should be ready for discharge to Med/surg on 06/13 after GI and ICU clears patient     Initial Physical Therapy Recommendations: N/A  Initial Occupational Therapy Recommendations: N/A  Initial /Plan: N/A    Home medications that are not reordered and reason why:   - Pending GI   - Pending SLIM      Spoke with none yet  regarding transfer  Please call ICU with any questions or concerns  Portions of the record may have been created with voice recognition software  Occasional wrong word or "sound a like" substitutions may have occurred due to the inherent limitations of voice recognition software  Read the chart carefully and recognize, using context, where substitutions have occurred

## 2022-06-14 PROBLEM — F32.A DEPRESSION: Status: ACTIVE | Noted: 2020-11-13

## 2022-06-14 PROBLEM — R26.2 AMBULATORY DYSFUNCTION: Status: ACTIVE | Noted: 2022-06-14

## 2022-06-14 LAB
ANION GAP SERPL CALCULATED.3IONS-SCNC: 4 MMOL/L (ref 4–13)
BASOPHILS # BLD AUTO: 0.05 THOUSANDS/ΜL (ref 0–0.1)
BASOPHILS NFR BLD AUTO: 1 % (ref 0–1)
BUN SERPL-MCNC: 15 MG/DL (ref 5–25)
CALCIUM SERPL-MCNC: 7.5 MG/DL (ref 8.4–10.2)
CHLORIDE SERPL-SCNC: 106 MMOL/L (ref 96–108)
CO2 SERPL-SCNC: 22 MMOL/L (ref 21–32)
CREAT SERPL-MCNC: 1.08 MG/DL (ref 0.6–1.3)
EOSINOPHIL # BLD AUTO: 0.52 THOUSAND/ΜL (ref 0–0.61)
EOSINOPHIL NFR BLD AUTO: 5 % (ref 0–6)
ERYTHROCYTE [DISTWIDTH] IN BLOOD BY AUTOMATED COUNT: 18.4 % (ref 11.6–15.1)
GFR SERPL CREATININE-BSD FRML MDRD: 49 ML/MIN/1.73SQ M
GLUCOSE SERPL-MCNC: 104 MG/DL (ref 65–140)
HCT VFR BLD AUTO: 30.2 % (ref 34.8–46.1)
HGB BLD-MCNC: 10.1 G/DL (ref 11.5–15.4)
IMM GRANULOCYTES # BLD AUTO: 0.11 THOUSAND/UL (ref 0–0.2)
IMM GRANULOCYTES NFR BLD AUTO: 1 % (ref 0–2)
LYMPHOCYTES # BLD AUTO: 1.62 THOUSANDS/ΜL (ref 0.6–4.47)
LYMPHOCYTES NFR BLD AUTO: 16 % (ref 14–44)
MCH RBC QN AUTO: 30.2 PG (ref 26.8–34.3)
MCHC RBC AUTO-ENTMCNC: 33.4 G/DL (ref 31.4–37.4)
MCV RBC AUTO: 90 FL (ref 82–98)
MONOCYTES # BLD AUTO: 1.1 THOUSAND/ΜL (ref 0.17–1.22)
MONOCYTES NFR BLD AUTO: 11 % (ref 4–12)
NEUTROPHILS # BLD AUTO: 6.87 THOUSANDS/ΜL (ref 1.85–7.62)
NEUTS SEG NFR BLD AUTO: 66 % (ref 43–75)
NRBC BLD AUTO-RTO: 0 /100 WBCS
PLATELET # BLD AUTO: 535 THOUSANDS/UL (ref 149–390)
PMV BLD AUTO: 9.1 FL (ref 8.9–12.7)
POTASSIUM SERPL-SCNC: 4 MMOL/L (ref 3.5–5.3)
RBC # BLD AUTO: 3.34 MILLION/UL (ref 3.81–5.12)
SODIUM SERPL-SCNC: 132 MMOL/L (ref 135–147)
WBC # BLD AUTO: 10.27 THOUSAND/UL (ref 4.31–10.16)

## 2022-06-14 PROCEDURE — 99233 SBSQ HOSP IP/OBS HIGH 50: CPT | Performed by: INTERNAL MEDICINE

## 2022-06-14 PROCEDURE — C9113 INJ PANTOPRAZOLE SODIUM, VIA: HCPCS

## 2022-06-14 PROCEDURE — 80048 BASIC METABOLIC PNL TOTAL CA: CPT

## 2022-06-14 PROCEDURE — 97163 PT EVAL HIGH COMPLEX 45 MIN: CPT

## 2022-06-14 PROCEDURE — 85025 COMPLETE CBC W/AUTO DIFF WBC: CPT

## 2022-06-14 PROCEDURE — 99232 SBSQ HOSP IP/OBS MODERATE 35: CPT | Performed by: NURSE PRACTITIONER

## 2022-06-14 RX ADMIN — ONDANSETRON 4 MG: 2 INJECTION INTRAMUSCULAR; INTRAVENOUS at 04:35

## 2022-06-14 RX ADMIN — LEVOTHYROXINE SODIUM 112 MCG: 112 TABLET ORAL at 04:26

## 2022-06-14 RX ADMIN — ACETAMINOPHEN 650 MG: 325 TABLET ORAL at 21:37

## 2022-06-14 RX ADMIN — CLONAZEPAM 1.5 MG: 1 TABLET ORAL at 21:37

## 2022-06-14 RX ADMIN — TRAZODONE HYDROCHLORIDE 50 MG: 50 TABLET ORAL at 21:37

## 2022-06-14 RX ADMIN — SODIUM CHLORIDE 8 MG/HR: 9 INJECTION, SOLUTION INTRAVENOUS at 06:02

## 2022-06-14 NOTE — ASSESSMENT & PLAN NOTE
- Hgb 4 4 on presentation, received 4 units PRBCs  - Hgb 10 1 today  - continue to trend Hgb  - con't ulcer management (Protonix drip)

## 2022-06-14 NOTE — ASSESSMENT & PLAN NOTE
· POA admission: 4 4, given 2 units of PRBCs and started on IV Protonix in ER  · Recent admission for symptomatic anemia on 05/24/2022 for, EGD during admission with noted ulcer and no active bleeding-was discharged home  · Underwent EGD with GI on 06/12/2022 -showed single large, deep, irregular, benign-appearing ulcer in the gastrojejunal anastomosis with adherent clot  Placed 2 clips successfully, injected 8ml of epinephrine to address bleeding; hemostasis achieved   · Hemoglobin stable yesterday at 9 9, and hemoglobin today 10 1    Etiology: Upper GI bleed 2/2 to gastric ulcer, status post EGD, treated with Endoclip placement   No signsof active GI bleeding at this time    Plan:   · Currently on Protonix drip, transition PO PPI BID tomorrow   · Advance diet as tolerated

## 2022-06-14 NOTE — PROGRESS NOTES
Progress Note - Geriatric Medicine   Terry Him 64 y o  female MRN: [id number]  Unit/Bed#: W -01 Encounter: [id number]      Assessment/Plan:  Acute blood loss anemia  Hemoglobin on admission was 4 4, given 2 units of PRBCs and started on IV Protonix in ER  Of note had recent admission for symptomatic anemia on 05/24/2022 for, EGD during admission with noted ulcer and no active bleeding-was discharged home  Patient underwent EGD with GI on 06/12/2022 which showed single large, deep, irregular, benign-appearing ulcer in the gastrojejunal anastomosis with adherent clot    Placed 2 clips successfully, injected 8ml of epinephrine to address bleeding; hemostasis achieved   Is currently on Protonix drip, GI recommends transitioning to PO PPI BID  Is currently on clear liquid diet, GI recommends to advanced as tolerated  Management per primary and GI  Hemoglobin stable yesterday at 9 9, and hemoglobin today 10 1    Severe protein calorie malnutrition  Last documented weight from yesterday was 111 lb, BMI 19 07  Continues with decreased appetite, although patient states she feels fine room once real food  Continue Ensure live t i d   Encourage adequate hydration and nutrition, including protein and meals  Dietitian following, appreciate recommendations    Anxiety/depression  Was continue anxiety and depression, other mood seems stable today  Patient's family does report patient has episodes of increased depression she will not talk with family or take medications  Is currently on Klonopin and trazodone  Neuropsych saw patient yesterday, deemed patient to not have capacity to make decisions    Cognitive impairment  Patient is alert oriented x2-3  At risk for delirium due to cognitive impairment  Recommend delirium precautions  Maintain sleep-wake cycle, avoid nighttime interruptions  Provide adequate pain control  Avoid urinary retention and constipation  Provide frequent and early mobilization  Provide frequent redirection and reorientation as needed  Avoid medications that may worsen or precipitate delirium such as tramadol, benzodiazepines, anticholinergics, and Benadryl  Redirect unwanted behaviors as first-line therapy, avoid physical restraints as able to  Consult placed for occupational therapy to do [place]   Per neuropsych patient deemed to not have capacity to make medical decisions  Patient's brother is her POA- currently in [place] working   Will need placement on discharge      Ambulatory dysfunction  At a baseline ambulates without any assistive devices  PT/OT following  Fall precautions  Out of bed as tolerated  Encourage early and frequent mobilization  Encourage adequate hydration and nutrition  Provide adequate pain management   Continue with PT/OT for continued strength and balance training       Subjective:   She is being seen for geriatrics follow-up  Upon exam she was lying in bed, resting  She appeared comfortable and was in no acute distress  She says she was feeling much better today and offered no acute complaints, but did say she "wants to go home"  Still remains with some intermittent insomnia  Her appetite is improving  Review of Systems   Constitutional: Positive for appetite change  Negative for activity change, chills and fever  Respiratory: Negative for cough and shortness of breath  Cardiovascular: Negative for chest pain and palpitations  Gastrointestinal: Positive for nausea  Negative for abdominal distention, abdominal pain, constipation, diarrhea and vomiting  Genitourinary: Negative for difficulty urinating, dysuria, frequency, hematuria and urgency  Musculoskeletal: Positive for arthralgias and gait problem  Negative for back pain  Skin: Negative for color change and rash  Neurological: Positive for weakness  Negative for dizziness, seizures, syncope, light-headedness and headaches  Psychiatric/Behavioral: Positive for sleep disturbance  Negative for dysphoric mood  The patient is nervous/anxious  Objective:     Vitals: Blood pressure 126/63, pulse 90, temperature 97 9 °F (36 6 °C), resp  rate 18, height 5' 4" (1 626 m), weight 50 4 kg (111 lb 1 8 oz), SpO2 98 %  ,Body mass index is 19 07 kg/m²  Intake/Output Summary (Last 24 hours) at 6/14/2022 1041  Last data filed at 6/14/2022 1018  Gross per 24 hour   Intake 480 ml   Output --   Net 480 ml       Current Medications: Reviewed    Physical Exam:   Physical Exam  Vitals reviewed  Constitutional:       General: She is not in acute distress  Appearance: She is well-developed  She is not ill-appearing  Comments: Frail appearing   HENT:      Head: Normocephalic and atraumatic  Mouth/Throat:      Mouth: Mucous membranes are dry  Pharynx: No oropharyngeal exudate or posterior oropharyngeal erythema  Cardiovascular:      Rate and Rhythm: Normal rate and regular rhythm  Heart sounds: No murmur heard  Pulmonary:      Effort: Pulmonary effort is normal  No respiratory distress  Breath sounds: Normal breath sounds  Abdominal:      General: Abdomen is flat  There is no distension  Palpations: Abdomen is soft  Tenderness: There is no abdominal tenderness  Musculoskeletal:      Right lower leg: No edema  Left lower leg: No edema  Skin:     General: Skin is warm and dry  Coloration: Skin is pale  Neurological:      General: No focal deficit present  Mental Status: She is alert and oriented to person, place, and time  Mental status is at baseline  Cranial Nerves: No cranial nerve deficit  Motor: Weakness present  Gait: Gait abnormal       Comments: forgetful at times   Psychiatric:         Mood and Affect: Mood is anxious            Invasive Devices  Report    Peripheral Intravenous Line  Duration           Peripheral IV 06/11/22 Right Antecubital 2 days    Peripheral IV 06/12/22 Right;Ventral (anterior) Forearm 1 day                Lab, Imaging and other studies: I have personally reviewed pertinent reports

## 2022-06-14 NOTE — PLAN OF CARE
Problem: PHYSICAL THERAPY ADULT  Goal: Performs mobility at highest level of function for planned discharge setting  See evaluation for individualized goals  Description: Treatment/Interventions: Functional transfer training, LE strengthening/ROM, Elevations, Therapeutic exercise, Endurance training, Cognitive reorientation, Patient/family training, Equipment eval/education, Bed mobility, Gait training  Equipment Recommended: Vicente Alfaro       See flowsheet documentation for full assessment, interventions and recommendations  6/14/2022 0908 by Jazmín Fischer PT  Note: Prognosis: Fair  Problem List: Decreased strength, Impaired balance, Decreased mobility, Decreased cognition, Impaired judgement, Decreased safety awareness  Assessment: Daisy Shea is a 64 y o  Female who [facility] on 6/11/2022 from home w/ c/o vomiting (hemoptysis) and diagnosis of acute blood loss anemia  Orders for PT eval and treat received, w/ activity orders of up w/ assist and fall risk precautions  Comorbidities affecting pt at time of evaluation include: anemia, anxiety, GERD, fibromyalgia  Personal factors affecting DC include: lives in 2 story house, stairs to enter home, inability to navigate community distances, inability to navigate level surfaces w/o external assistance, decreased cognition, limited home support, positive fall history, impulsivity, limited insight into impairments and inability to perform IADLs  At baseline, pt mobilizes independently w/ no AD, and reports 0 fall(s) in the previous 6 months  Upon evaluation, pt presents w/ the following deficits: weakness, impaired balance, decreased safety awareness, and gait deviations  Pt currently requires supervision for bed mobility, supervision for transfers, and min-max Ax1 w/ RW for gait   Pt's clinical presentation is unstable/unpredictable due to need for assist w/ all phases of mobility when usually mobilizing independently, need for input for mobility technique/safety, ongoing medical monitoring/management, and recent history of falls  Pt is at an increased risk of falls due to impaired balance, decreased safety awareness, limited insight into current deficits, and impulsivity  From a PT/mobility standpoint, given the above findings, DC recommendation is for Post-acute inpatient rehabilitation vs HHPT  During this admission, pt would benefit from continued skilled inpatient PT in the acute care setting in order to address the above deficits to maximize function and mobility before DC from acute care  Barriers to Discharge: (S) Inaccessible home environment, Decreased caregiver support        PT Discharge Recommendation: Post acute rehabilitation services (vs HHPT pending progress w/ functional mobility and increased support/supervision upon DC)          See flowsheet documentation for full assessment

## 2022-06-14 NOTE — ASSESSMENT & PLAN NOTE
· Continue Klonopin and trazodone  · Seen by Neuropsych- deemed patient to not have capacity to make decisions  · Delirium precautions

## 2022-06-14 NOTE — ASSESSMENT & PLAN NOTE
Malnutrition Findings:   Adult Malnutrition type: Chronic illness  Adult Degree of Malnutrition: Other severe protein calorie malnutrition  Malnutrition Characteristics: Muscle loss, Weight loss, Fat loss         - advance diet gradually   - on clear liquids now         360 Statement: Severe malnutrition r/t inadequate intake as evidenced by 13% weight loss over 4 months, orbitals hollow, muscle wasting present to temples; nutritional supplements ordered once diet advances    BMI Findings: Body mass index is 19 07 kg/m²

## 2022-06-14 NOTE — PROGRESS NOTES
[facility]  Progress Note - Malick Christianson 1945, 64 y o  female MRN: [id number]  Unit/Bed#: W -01 Encounter: [id number]  Primary Care Provider: Brooke Salcedo MD   Date and time admitted to hospital: 6/11/2022  4:40 PM    * Acute blood loss anemia  Assessment & Plan  · POA admission: 4 4, given 2 units of PRBCs and started on IV Protonix in ER  · Recent admission for symptomatic anemia on 05/24/2022 for, EGD during admission with noted ulcer and no active bleeding-was discharged home  · Underwent EGD with GI on 06/12/2022 -showed single large, deep, irregular, benign-appearing ulcer in the gastrojejunal anastomosis with adherent clot  Placed 2 clips successfully, injected 8ml of epinephrine to address bleeding; hemostasis achieved   · Hemoglobin stable yesterday at 9 9, and hemoglobin today 10 1    Etiology: Upper GI bleed 2/2 to gastric ulcer, status post EGD, treated with Endoclip placement   No signsof active GI bleeding at this time    Plan:   · Currently on Protonix drip, transition PO PPI BID tomorrow   · Advance diet as tolerated        Hyponatremia  Assessment & Plan  Lab Results   Component Value Date    SODIUM 132 (L) 06/14/2022    SODIUM 136 06/13/2022    SODIUM 136 06/12/2022     · Encourage PO intake  · Monitor BMP on 6/15 AM    Hypothyroidism  Assessment & Plan      Lab Results   Component Value Date    [id number] 3 492 05/24/2022    TSH 0 66 11/14/2020     · Continue levothyroxine    Depression  Assessment & Plan  · Continue Klonopin and trazodone  · Seen by Neuropsych- deemed patient to not have capacity to make decisions  · Delirium precautions      Severe protein-calorie malnutrition ([facility] [address] )  Assessment & Plan  Malnutrition Findings:   Adult Malnutrition type: Chronic illness  Adult Degree of Malnutrition: Other severe protein calorie malnutrition  Malnutrition Characteristics: Muscle loss, Weight loss, Fat loss              360 Statement: Severe malnutrition r/t inadequate intake as evidenced by 13% weight loss over 4 months, orbitals hollow, muscle wasting present to temples; nutritional supplements ordered once diet advances           Body mass index is 19 07 kg/m²  Plan:  · Continue Ensure   · Encourage PO hydration and nutrition   · Dietitian following, appreciate recommendations    Ambulatory dysfunction  Assessment & Plan  · Encourage PO intake  · Continue with PT/OT   · Recommended post acute rehab after discharge      VTE Pharmacologic Prophylaxis: VTE Score: 3 held due to GI bleed    Patient Centered Rounds: I performed bedside rounds with nursing staff today  Discussions with Specialists or Other Care Team Provider: GI    Education and Discussions with Family / Patient: Will call family later today to update  Current Length of Stay: 3 day(s)  Current Patient Status: Inpatient   Discharge Plan: Anticipate discharge in 24-48 hrs to post acute rehab    Code Status: Level 1 - Full Code    Subjective:   No acute events overnight, complains of some trouble sleeping  Pt was sitting up and eating this AM  Denies pain  Reports some mild nausea that she attributes to hunger, denies vomiting, diarrhea  No bowel movement since resolution of BRBPR  Passing flatus  Urinating normally  Denies CP/SOB  Reports chills  Objective:     Vitals:   Temp (24hrs), Av 6 °F (37 °C), Min:97 9 °F (36 6 °C), Max:100 4 °F (38 °C)    Temp:  [97 9 °F (36 6 °C)-100 4 °F (38 °C)] 98 °F (36 7 °C)  HR:  [74-90] 74  Resp:  [16-18] 18  BP: (115-126)/(60-63) 115/62  SpO2:  [97 %-99 %] 99 %  Body mass index is 19 07 kg/m²  Input and Output Summary (last 24 hours): Intake/Output Summary (Last 24 hours) at 2022 1706  Last data filed at 2022 1320  Gross per 24 hour   Intake 1010 ml   Output --   Net 1010 ml       Physical Exam:   Physical Exam  Constitutional:       General: She is not in acute distress  Appearance: She is cachectic     HENT:      Head: Normocephalic and atraumatic  Cardiovascular:      Rate and Rhythm: Normal rate and regular rhythm  Heart sounds: Normal heart sounds  Pulmonary:      Breath sounds: Wheezing present  Abdominal:      General: Abdomen is flat  Bowel sounds are increased  There is no distension  Palpations: Abdomen is soft  Tenderness: There is generalized abdominal tenderness  Musculoskeletal:      Right lower leg: Edema present  Left lower leg: Edema present  Skin:     General: Skin is warm and dry  Neurological:      Mental Status: She is alert and oriented to person, place, and time  Psychiatric:         Mood and Affect: Mood normal       Additional Data:     Labs:  Results from last 7 days   Lab Units 06/14/22  1044 06/12/22  0102 06/11/22  1704   WBC Thousand/uL 10 27*   < > 13 08*   HEMOGLOBIN g/dL 10 1*   < > 4 4*   HEMATOCRIT % 30 2*   < > 14 9*   PLATELETS Thousands/uL 535*   < > 635*   BANDS PCT %  --   --  3   NEUTROS PCT % 66   < >  --    LYMPHS PCT % 16   < >  --    LYMPHO PCT %  --   --  8*   MONOS PCT % 11   < >  --    MONO PCT %  --   --  5   EOS PCT % 5   < > 1    < > = values in this interval not displayed       Results from last 7 days   Lab Units 06/14/22  1044 06/13/22  0550   SODIUM mmol/L 132* 136   POTASSIUM mmol/L 4 0 3 7   CHLORIDE mmol/L 106 109*   CO2 mmol/L 22 21   BUN mg/dL 15 20   CREATININE mg/dL 1 08 1 16   ANION GAP mmol/L 4 6   CALCIUM mg/dL 7 5* 7 6*   ALBUMIN g/dL  --  2 4*   TOTAL BILIRUBIN mg/dL  --  0 75   ALK PHOS U/L  --  42   ALT U/L  --  9   AST U/L  --  17   GLUCOSE RANDOM mg/dL 104 86     Results from last 7 days   Lab Units 06/13/22  0550   INR  1 05     Results from last 7 days   Lab Units 06/12/22  1910   POC GLUCOSE mg/dl 101         Results from last 7 days   Lab Units 06/12/22  0919   LACTIC ACID mmol/L 0 5       Lines/Drains:  Invasive Devices  Report    Peripheral Intravenous Line  Duration           Peripheral IV 06/11/22 Right Antecubital 3 days Peripheral IV 06/12/22 Right;Ventral (anterior) Forearm 2 days                Imaging:   No orders to display         Recent Cultures (last 7 days):         Last 24 Hours Medication List:   Current Facility-Administered Medications   Medication Dose Route Frequency Provider Last Rate    acetaminophen  650 mg Oral Q6H PRN Poncho Weaver PA-C      clonazePAM  1 5 mg Oral HS Jefe Dwain Cris, DO      levothyroxine  112 mcg Oral Early Morning Jefe Dwain Cris, DO      ondansetron  4 mg Intravenous Q6H PRN Jefe Dawin Cris, DO      pantoprozole (PROTONIX) infusion (Continuous)  8 mg/hr Intravenous Continuous Jefe Dwain Cris, DO 8 mg/hr (06/14/22 0602)    traZODone  50 mg Oral HS Patrica Camarillo,           Today, Patient Was Seen By: Maria Luisa Montez MD    **Please Note: This note may have been constructed using a voice recognition system  **

## 2022-06-14 NOTE — ASSESSMENT & PLAN NOTE
Lab Results   Component Value Date    SODIUM 132 (L) 06/14/2022    SODIUM 136 06/13/2022    SODIUM 136 06/12/2022     · Encourage PO intake  · Monitor BMP on 6/15 AM

## 2022-06-14 NOTE — QUICK NOTE
I spoke with patient's sister, Ms Ubaldo Loco, and gave her a clinical update  She tells me she is not the POA; her brother Emeka Daniel) is the [place] and is currently in [place]  He will be making the decisions regarding her medical care  I updated Ms Ubaldo Loco regarding patient's clinical status and answered all of her questions  Her sister is okay with patient going to rehab after discharge in case we cannot reach her brother, who is the POA  She tells me she would speak with the POA [place]  Our team will reach out to the POA also if we can  His phone number is [phone]

## 2022-06-14 NOTE — ASSESSMENT & PLAN NOTE
Lab Results   Component Value Date    [id number] 3 492 05/24/2022    TSH 0 66 11/14/2020     · Continue levothyroxine

## 2022-06-14 NOTE — ASSESSMENT & PLAN NOTE
Malnutrition Findings:   Adult Malnutrition type: Chronic illness  Adult Degree of Malnutrition: Other severe protein calorie malnutrition  Malnutrition Characteristics: Muscle loss, Weight loss, Fat loss              360 Statement: Severe malnutrition r/t inadequate intake as evidenced by 13% weight loss over 4 months, orbitals hollow, muscle wasting present to temples; nutritional supplements ordered once diet advances           Body mass index is 19 07 kg/m²     Plan:  · Continue Ensure   · Encourage PO hydration and nutrition   · Dietitian following, appreciate recommendations

## 2022-06-15 ENCOUNTER — ANESTHESIA (INPATIENT)
Dept: PERIOP | Facility: HOSPITAL | Age: 77
DRG: 377 | End: 2022-06-15
Payer: COMMERCIAL

## 2022-06-15 ENCOUNTER — ANESTHESIA EVENT (INPATIENT)
Dept: PERIOP | Facility: HOSPITAL | Age: 77
DRG: 377 | End: 2022-06-15
Payer: COMMERCIAL

## 2022-06-15 ENCOUNTER — APPOINTMENT (INPATIENT)
Dept: PERIOP | Facility: HOSPITAL | Age: 77
DRG: 377 | End: 2022-06-15
Attending: INTERNAL MEDICINE
Payer: COMMERCIAL

## 2022-06-15 PROBLEM — D72.9 NEUTROPHILIC LEUKOCYTOSIS: Status: ACTIVE | Noted: 2022-06-15

## 2022-06-15 PROBLEM — E87.1 HYPONATREMIA: Status: RESOLVED | Noted: 2022-05-24 | Resolved: 2022-06-15

## 2022-06-15 LAB
ABO GROUP BLD: NORMAL
ANION GAP SERPL CALCULATED.3IONS-SCNC: 8 MMOL/L (ref 4–13)
ANION GAP SERPL CALCULATED.3IONS-SCNC: 8 MMOL/L (ref 4–13)
APTT PPP: 29 SECONDS (ref 23–37)
BASOPHILS # BLD AUTO: 0.05 THOUSANDS/ΜL (ref 0–0.1)
BASOPHILS NFR BLD AUTO: 0 % (ref 0–1)
BLD GP AB SCN SERPL QL: NEGATIVE
BUN SERPL-MCNC: 14 MG/DL (ref 5–25)
BUN SERPL-MCNC: 24 MG/DL (ref 5–25)
CA-I BLD-SCNC: 0.96 MMOL/L (ref 1.12–1.32)
CALCIUM SERPL-MCNC: 7 MG/DL (ref 8.4–10.2)
CALCIUM SERPL-MCNC: 7.8 MG/DL (ref 8.4–10.2)
CFFMA (FUNCTIONAL FIBRINOGEN MAX AMPLITUDE): 18.9 MM (ref 15–32)
CHLORIDE SERPL-SCNC: 107 MMOL/L (ref 96–108)
CHLORIDE SERPL-SCNC: 108 MMOL/L (ref 96–108)
CKLY30: 0 % (ref 0–2.6)
CKR(REACTION TIME): 4.7 MIN (ref 4.6–9.1)
CO2 SERPL-SCNC: 18 MMOL/L (ref 21–32)
CO2 SERPL-SCNC: 21 MMOL/L (ref 21–32)
CREAT SERPL-MCNC: 1.04 MG/DL (ref 0.6–1.3)
CREAT SERPL-MCNC: 1.1 MG/DL (ref 0.6–1.3)
CRTMA(RAPIDTEG MAX AMPLITUDE): 64.8 MM (ref 52–70)
EOSINOPHIL # BLD AUTO: 0.58 THOUSAND/ΜL (ref 0–0.61)
EOSINOPHIL NFR BLD AUTO: 4 % (ref 0–6)
ERYTHROCYTE [DISTWIDTH] IN BLOOD BY AUTOMATED COUNT: 17.6 % (ref 11.6–15.1)
GFR SERPL CREATININE-BSD FRML MDRD: 48 ML/MIN/1.73SQ M
GFR SERPL CREATININE-BSD FRML MDRD: 51 ML/MIN/1.73SQ M
GLUCOSE SERPL-MCNC: 136 MG/DL (ref 65–140)
GLUCOSE SERPL-MCNC: 92 MG/DL (ref 65–140)
HCT VFR BLD AUTO: 18.9 % (ref 34.8–46.1)
HCT VFR BLD AUTO: 29.6 % (ref 34.8–46.1)
HCT VFR BLD AUTO: 30.2 % (ref 34.8–46.1)
HGB BLD-MCNC: 6.2 G/DL (ref 11.5–15.4)
HGB BLD-MCNC: 9.9 G/DL (ref 11.5–15.4)
HGB BLD-MCNC: 9.9 G/DL (ref 11.5–15.4)
IMM GRANULOCYTES # BLD AUTO: 0.12 THOUSAND/UL (ref 0–0.2)
IMM GRANULOCYTES NFR BLD AUTO: 1 % (ref 0–2)
INR PPP: 1.51 (ref 0.84–1.19)
LACTATE SERPL-SCNC: 3.6 MMOL/L (ref 0.5–2)
LYMPHOCYTES # BLD AUTO: 2.23 THOUSANDS/ΜL (ref 0.6–4.47)
LYMPHOCYTES NFR BLD AUTO: 17 % (ref 14–44)
MCH RBC QN AUTO: 30 PG (ref 26.8–34.3)
MCHC RBC AUTO-ENTMCNC: 33.4 G/DL (ref 31.4–37.4)
MCV RBC AUTO: 90 FL (ref 82–98)
MONOCYTES # BLD AUTO: 1.27 THOUSAND/ΜL (ref 0.17–1.22)
MONOCYTES NFR BLD AUTO: 9 % (ref 4–12)
NEUTROPHILS # BLD AUTO: 9.29 THOUSANDS/ΜL (ref 1.85–7.62)
NEUTS SEG NFR BLD AUTO: 69 % (ref 43–75)
NRBC BLD AUTO-RTO: 0 /100 WBCS
PLATELET # BLD AUTO: 608 THOUSANDS/UL (ref 149–390)
PMV BLD AUTO: 9.1 FL (ref 8.9–12.7)
POTASSIUM SERPL-SCNC: 3.8 MMOL/L (ref 3.5–5.3)
POTASSIUM SERPL-SCNC: 4.3 MMOL/L (ref 3.5–5.3)
PROTHROMBIN TIME: 18.1 SECONDS (ref 11.6–14.5)
RBC # BLD AUTO: 3.3 MILLION/UL (ref 3.81–5.12)
RH BLD: POSITIVE
SODIUM SERPL-SCNC: 134 MMOL/L (ref 135–147)
SODIUM SERPL-SCNC: 136 MMOL/L (ref 135–147)
SPECIMEN EXPIRATION DATE: NORMAL
WBC # BLD AUTO: 13.54 THOUSAND/UL (ref 4.31–10.16)

## 2022-06-15 PROCEDURE — 43255 EGD CONTROL BLEEDING ANY: CPT | Performed by: INTERNAL MEDICINE

## 2022-06-15 PROCEDURE — 83605 ASSAY OF LACTIC ACID: CPT | Performed by: SURGERY

## 2022-06-15 PROCEDURE — 80048 BASIC METABOLIC PNL TOTAL CA: CPT

## 2022-06-15 PROCEDURE — 99232 SBSQ HOSP IP/OBS MODERATE 35: CPT | Performed by: NURSE PRACTITIONER

## 2022-06-15 PROCEDURE — 85014 HEMATOCRIT: CPT

## 2022-06-15 PROCEDURE — 80048 BASIC METABOLIC PNL TOTAL CA: CPT | Performed by: SURGERY

## 2022-06-15 PROCEDURE — 99233 SBSQ HOSP IP/OBS HIGH 50: CPT | Performed by: INTERNAL MEDICINE

## 2022-06-15 PROCEDURE — 85610 PROTHROMBIN TIME: CPT

## 2022-06-15 PROCEDURE — C9113 INJ PANTOPRAZOLE SODIUM, VIA: HCPCS

## 2022-06-15 PROCEDURE — 85018 HEMOGLOBIN: CPT

## 2022-06-15 PROCEDURE — 86900 BLOOD TYPING SEROLOGIC ABO: CPT

## 2022-06-15 PROCEDURE — 82330 ASSAY OF CALCIUM: CPT | Performed by: SURGERY

## 2022-06-15 PROCEDURE — 0W3P8ZZ CONTROL BLEEDING IN GASTROINTESTINAL TRACT, VIA NATURAL OR ARTIFICIAL OPENING ENDOSCOPIC: ICD-10-PCS | Performed by: INTERNAL MEDICINE

## 2022-06-15 PROCEDURE — 85576 BLOOD PLATELET AGGREGATION: CPT | Performed by: SURGERY

## 2022-06-15 PROCEDURE — C9113 INJ PANTOPRAZOLE SODIUM, VIA: HCPCS | Performed by: SURGERY

## 2022-06-15 PROCEDURE — 86901 BLOOD TYPING SEROLOGIC RH(D): CPT

## 2022-06-15 PROCEDURE — 85347 COAGULATION TIME ACTIVATED: CPT | Performed by: SURGERY

## 2022-06-15 PROCEDURE — 85397 CLOTTING FUNCT ACTIVITY: CPT | Performed by: SURGERY

## 2022-06-15 PROCEDURE — 85027 COMPLETE CBC AUTOMATED: CPT | Performed by: NURSE ANESTHETIST, CERTIFIED REGISTERED

## 2022-06-15 PROCEDURE — P9016 RBC LEUKOCYTES REDUCED: HCPCS

## 2022-06-15 PROCEDURE — 99232 SBSQ HOSP IP/OBS MODERATE 35: CPT | Performed by: INTERNAL MEDICINE

## 2022-06-15 PROCEDURE — 85730 THROMBOPLASTIN TIME PARTIAL: CPT

## 2022-06-15 PROCEDURE — 85384 FIBRINOGEN ACTIVITY: CPT | Performed by: SURGERY

## 2022-06-15 PROCEDURE — 86923 COMPATIBILITY TEST ELECTRIC: CPT

## 2022-06-15 PROCEDURE — 85025 COMPLETE CBC W/AUTO DIFF WBC: CPT

## 2022-06-15 PROCEDURE — 86850 RBC ANTIBODY SCREEN: CPT

## 2022-06-15 PROCEDURE — P9040 RBC LEUKOREDUCED IRRADIATED: HCPCS

## 2022-06-15 PROCEDURE — 99223 1ST HOSP IP/OBS HIGH 75: CPT | Performed by: SURGERY

## 2022-06-15 RX ORDER — ALBUMIN, HUMAN INJ 5% 5 %
SOLUTION INTRAVENOUS CONTINUOUS PRN
Status: DISCONTINUED | OUTPATIENT
Start: 2022-06-15 | End: 2022-06-15

## 2022-06-15 RX ORDER — LIDOCAINE HYDROCHLORIDE 10 MG/ML
INJECTION, SOLUTION EPIDURAL; INFILTRATION; INTRACAUDAL; PERINEURAL AS NEEDED
Status: DISCONTINUED | OUTPATIENT
Start: 2022-06-15 | End: 2022-06-15

## 2022-06-15 RX ORDER — DIPHENHYDRAMINE HYDROCHLORIDE 50 MG/ML
12.5 INJECTION INTRAMUSCULAR; INTRAVENOUS ONCE AS NEEDED
Status: DISCONTINUED | OUTPATIENT
Start: 2022-06-15 | End: 2022-06-15 | Stop reason: HOSPADM

## 2022-06-15 RX ORDER — SUCRALFATE 1 G/1
1 TABLET ORAL
Status: DISCONTINUED | OUTPATIENT
Start: 2022-06-15 | End: 2022-06-15

## 2022-06-15 RX ORDER — DEXAMETHASONE SODIUM PHOSPHATE 10 MG/ML
INJECTION, SOLUTION INTRAMUSCULAR; INTRAVENOUS AS NEEDED
Status: DISCONTINUED | OUTPATIENT
Start: 2022-06-15 | End: 2022-06-15

## 2022-06-15 RX ORDER — PANTOPRAZOLE SODIUM 40 MG/1
40 TABLET, DELAYED RELEASE ORAL
Status: DISCONTINUED | OUTPATIENT
Start: 2022-06-15 | End: 2022-06-15

## 2022-06-15 RX ORDER — METOCLOPRAMIDE HYDROCHLORIDE 5 MG/ML
10 INJECTION INTRAMUSCULAR; INTRAVENOUS ONCE
Status: COMPLETED | OUTPATIENT
Start: 2022-06-15 | End: 2022-06-15

## 2022-06-15 RX ORDER — ONDANSETRON 2 MG/ML
4 INJECTION INTRAMUSCULAR; INTRAVENOUS ONCE AS NEEDED
Status: DISCONTINUED | OUTPATIENT
Start: 2022-06-15 | End: 2022-06-15 | Stop reason: HOSPADM

## 2022-06-15 RX ORDER — MISOPROSTOL 200 UG/1
200 TABLET ORAL
Status: DISCONTINUED | OUTPATIENT
Start: 2022-06-15 | End: 2022-06-16

## 2022-06-15 RX ORDER — PANTOPRAZOLE SODIUM 40 MG/1
40 INJECTION, POWDER, FOR SOLUTION INTRAVENOUS ONCE
Status: COMPLETED | OUTPATIENT
Start: 2022-06-15 | End: 2022-06-15

## 2022-06-15 RX ORDER — FENTANYL CITRATE/PF 50 MCG/ML
25 SYRINGE (ML) INJECTION
Status: DISCONTINUED | OUTPATIENT
Start: 2022-06-15 | End: 2022-06-15 | Stop reason: HOSPADM

## 2022-06-15 RX ORDER — EPINEPHRINE 0.1 MG/ML
SYRINGE (ML) INJECTION CODE/TRAUMA/SEDATION MEDICATION
Status: COMPLETED | OUTPATIENT
Start: 2022-06-15 | End: 2022-06-15

## 2022-06-15 RX ORDER — SUCCINYLCHOLINE/SOD CL,ISO/PF 100 MG/5ML
SYRINGE (ML) INTRAVENOUS AS NEEDED
Status: DISCONTINUED | OUTPATIENT
Start: 2022-06-15 | End: 2022-06-15

## 2022-06-15 RX ORDER — SODIUM CHLORIDE, SODIUM GLUCONATE, SODIUM ACETATE, POTASSIUM CHLORIDE, MAGNESIUM CHLORIDE, SODIUM PHOSPHATE, DIBASIC, AND POTASSIUM PHOSPHATE .53; .5; .37; .037; .03; .012; .00082 G/100ML; G/100ML; G/100ML; G/100ML; G/100ML; G/100ML; G/100ML
125 INJECTION, SOLUTION INTRAVENOUS CONTINUOUS
Status: DISCONTINUED | OUTPATIENT
Start: 2022-06-16 | End: 2022-06-16 | Stop reason: HOSPADM

## 2022-06-15 RX ORDER — SUCRALFATE ORAL 1 G/10ML
1000 SUSPENSION ORAL EVERY 6 HOURS SCHEDULED
Status: DISCONTINUED | OUTPATIENT
Start: 2022-06-16 | End: 2022-06-15

## 2022-06-15 RX ORDER — SODIUM CHLORIDE 9 MG/ML
125 INJECTION, SOLUTION INTRAVENOUS CONTINUOUS
Status: DISCONTINUED | OUTPATIENT
Start: 2022-06-15 | End: 2022-06-15

## 2022-06-15 RX ORDER — METOCLOPRAMIDE HYDROCHLORIDE 5 MG/ML
10 INJECTION INTRAMUSCULAR; INTRAVENOUS EVERY 6 HOURS PRN
Status: DISCONTINUED | OUTPATIENT
Start: 2022-06-15 | End: 2022-06-15

## 2022-06-15 RX ORDER — HYDROMORPHONE HCL IN WATER/PF 6 MG/30 ML
0.2 PATIENT CONTROLLED ANALGESIA SYRINGE INTRAVENOUS
Status: DISCONTINUED | OUTPATIENT
Start: 2022-06-15 | End: 2022-06-15 | Stop reason: HOSPADM

## 2022-06-15 RX ORDER — PROPOFOL 10 MG/ML
INJECTION, EMULSION INTRAVENOUS AS NEEDED
Status: DISCONTINUED | OUTPATIENT
Start: 2022-06-15 | End: 2022-06-15

## 2022-06-15 RX ORDER — EPHEDRINE SULFATE 50 MG/ML
INJECTION INTRAVENOUS AS NEEDED
Status: DISCONTINUED | OUTPATIENT
Start: 2022-06-15 | End: 2022-06-15

## 2022-06-15 RX ADMIN — LIDOCAINE HYDROCHLORIDE 50 MG: 10 INJECTION, SOLUTION EPIDURAL; INFILTRATION; INTRACAUDAL at 21:45

## 2022-06-15 RX ADMIN — SODIUM CHLORIDE 1000 ML: 0.9 INJECTION, SOLUTION INTRAVENOUS at 20:26

## 2022-06-15 RX ADMIN — EPINEPHRINE 1 MG: 0.1 INJECTION, SOLUTION ENDOTRACHEAL; INTRACARDIAC; INTRAVENOUS at 22:49

## 2022-06-15 RX ADMIN — PANTOPRAZOLE SODIUM 40 MG: 40 TABLET, DELAYED RELEASE ORAL at 15:45

## 2022-06-15 RX ADMIN — EPHEDRINE SULFATE 5 MG: 50 INJECTION, SOLUTION INTRAVENOUS at 21:57

## 2022-06-15 RX ADMIN — PROPOFOL 80 MG: 10 INJECTION, EMULSION INTRAVENOUS at 21:45

## 2022-06-15 RX ADMIN — METOCLOPRAMIDE HYDROCHLORIDE 10 MG: 5 INJECTION INTRAMUSCULAR; INTRAVENOUS at 20:05

## 2022-06-15 RX ADMIN — DEXAMETHASONE SODIUM PHOSPHATE 4 MG: 10 INJECTION, SOLUTION INTRAMUSCULAR; INTRAVENOUS at 21:55

## 2022-06-15 RX ADMIN — Medication 100 MG: at 21:45

## 2022-06-15 RX ADMIN — SODIUM CHLORIDE 8 MG/HR: 9 INJECTION, SOLUTION INTRAVENOUS at 20:35

## 2022-06-15 RX ADMIN — ONDANSETRON 4 MG: 2 INJECTION INTRAMUSCULAR; INTRAVENOUS at 15:09

## 2022-06-15 RX ADMIN — SODIUM CHLORIDE 8 MG/HR: 9 INJECTION, SOLUTION INTRAVENOUS at 00:41

## 2022-06-15 RX ADMIN — ALBUMIN (HUMAN): 12.5 INJECTION, SOLUTION INTRAVENOUS at 22:05

## 2022-06-15 RX ADMIN — PANTOPRAZOLE SODIUM 40 MG: 40 INJECTION, POWDER, FOR SOLUTION INTRAVENOUS at 23:23

## 2022-06-15 RX ADMIN — ONDANSETRON 4 MG: 2 INJECTION INTRAMUSCULAR; INTRAVENOUS at 22:01

## 2022-06-15 RX ADMIN — SODIUM CHLORIDE 125 ML/HR: 0.9 INJECTION, SOLUTION INTRAVENOUS at 19:51

## 2022-06-15 RX ADMIN — LEVOTHYROXINE SODIUM 112 MCG: 112 TABLET ORAL at 05:28

## 2022-06-15 RX ADMIN — ONDANSETRON 4 MG: 2 INJECTION INTRAMUSCULAR; INTRAVENOUS at 20:05

## 2022-06-15 NOTE — PROGRESS NOTES
Progress Note - Geriatric Medicine   Grey Danielle 64 y o  female MRN: [id number]  Unit/Bed#: W -01 Encounter: [id number]      Assessment/Plan:  Acute blood loss anemia  Hemoglobin on admission was 4 4, given 2 units of PRBCs and started on IV Protonix in ER  Of note had recent admission for symptomatic anemia on 05/24/2022 for, EGD during admission with noted ulcer and no active bleeding-was discharged home  Patient underwent EGD with GI on 06/12/2022 which showed single large, deep, irregular, benign-appearing ulcer in the gastrojejunal anastomosis with adherent clot  Placed 2 clips successfully, injected 8ml of epinephrine to address bleeding; hemostasis achieved   Was transitioned to Protonix PO 40mg BID  Was transitioned to GI diet; non ulcerogenic  Hemoglobin stable at 9 9  Management per primary and GI    Severe protein malnutrition  Last documented weight from yesterday was 110 lb, BMI 19 00  Diet was changed from clear to GI diet  Continue Ensure live t i d    Patient states appetite is improving  Encourage adequate hydration and nutrition, including protein and meals  Dietitian following, appreciate recommendations    Anxiety/depression  Was very anxious on exam today as patient thought that she lost her checkbook which she eventually found, does also keep saying she wants to go home  Currently on Klonopin and trazadone  Was deemed to NOT have capacity to make medical decisions by Neuropsych    Cognitive Impairment  Patient is alert oriented x3  At risk for delirium due to cognitive impairment  Recommend delirium precautions  Maintain sleep-wake cycle, avoid nighttime interruptions  Provide adequate pain control  Avoid urinary retention and constipation  Provide frequent and early mobilization  Provide frequent redirection and reorientation as needed  Avoid medications that may worsen or precipitate delirium such as tramadol, benzodiazepines, anticholinergics, and Benadryl  Redirect unwanted behaviors as first-line therapy, avoid physical restraints as able to  Continue to monitor    Ambulatory dysfunction  At a baseline ambulates without assistive devices  PT/OT following  Fall precautions  Out of bed as tolerated  Encourage early and frequent mobilization  Encourage adequate hydration and nutrition  Provide adequate pain management   Goal is to discharge to STR  Continue with PT/OT for continued strength and balance training     Leukocytosis   WBC today 13 54 up from 10 27  VSS, afebrile  Management per primary team       Subjective:   Patient is being seen for Geriatric follow up  Upon examination patient was laying in bed, resting  She was slightly anxious throughout exam   Her appetite is improving  She had no complaints of pain  Review of Systems   Constitutional: Positive for appetite change  Negative for activity change, chills and fever  Respiratory: Negative for cough and shortness of breath  Cardiovascular: Negative for chest pain and palpitations  Gastrointestinal: Negative for abdominal distention, abdominal pain, constipation, diarrhea, nausea and vomiting  Genitourinary: Negative for difficulty urinating, dysuria, frequency, hematuria and urgency  Musculoskeletal: Positive for arthralgias and gait problem  Negative for back pain  Skin: Negative for color change and rash  Neurological: Positive for weakness  Negative for dizziness, seizures, syncope, light-headedness and headaches  Psychiatric/Behavioral: Positive for sleep disturbance  Negative for dysphoric mood  The patient is nervous/anxious  Objective:     Vitals: Blood pressure 117/68, pulse 83, temperature 98 3 °F (36 8 °C), resp  rate 18, height 5' 4" (1 626 m), weight 50 2 kg (110 lb 10 7 oz), SpO2 98 %  ,Body mass index is 19 kg/m²        Intake/Output Summary (Last 24 hours) at 6/15/2022 0921  Last data filed at 6/15/2022 0823  Gross per 24 hour   Intake 1370 ml   Output --   Net 1370 ml Current Medications: Reviewed    Physical Exam:   Physical Exam  Vitals reviewed  Constitutional:       General: She is not in acute distress  Appearance: She is well-developed  She is not ill-appearing  Comments: Frail appearing   HENT:      Head: Normocephalic and atraumatic  Mouth/Throat:      Mouth: Mucous membranes are dry  Pharynx: No oropharyngeal exudate or posterior oropharyngeal erythema  Cardiovascular:      Rate and Rhythm: Normal rate and regular rhythm  Heart sounds: No murmur heard  Pulmonary:      Effort: Pulmonary effort is normal  No respiratory distress  Breath sounds: Normal breath sounds  Abdominal:      General: Abdomen is flat  There is no distension  Palpations: Abdomen is soft  Tenderness: There is no abdominal tenderness  Musculoskeletal:      Right lower leg: No edema  Left lower leg: No edema  Skin:     General: Skin is warm and dry  Coloration: Skin is pale  Neurological:      General: No focal deficit present  Mental Status: She is alert and oriented to person, place, and time  Mental status is at baseline  Cranial Nerves: No cranial nerve deficit  Motor: Weakness present  Gait: Gait abnormal       Comments: forgetful at times   Psychiatric:         Mood and Affect: Mood is anxious  Invasive Devices  Report    Peripheral Intravenous Line  Duration           Peripheral IV 06/15/22 Dorsal (posterior); Right Hand <1 day                Lab, Imaging and other studies: I have personally reviewed pertinent reports

## 2022-06-15 NOTE — CASE MANAGEMENT
Case Management Progress Note    Patient name Federica Mauricio  Location W /W -01 MRN [id number]  : 1945 Date 6/15/2022       LOS (days): 4  Geometric Mean LOS (GMLOS) (days): 4 40  Days to GMLOS:0 8        OBJECTIVE:        Current admission status: Inpatient  [facility]   [facility] [address] [facility], [address] [place] [address] [address] [place] [address] [address]  Phone: [phone] Fax: [phone]    Primary Care Provider: Deanna Langford MD    Primary Insurance: [facility]  Secondary Insurance:     PROGRESS NOTE:        Weekly Care Management Length of Stay Review     Current LOS: 4    Most Recent Labs:     Lab Results   Component Value Date/Time    WBC 13 54 (H) 06/15/2022 04:39 AM    HGB 9 9 (L) 06/15/2022 04:39 AM    HCT 29 6 (L) 06/15/2022 04:39 AM     (H) 06/15/2022 04:39 AM    SODIUM 136 06/15/2022 04:39 AM    K 3 8 06/15/2022 04:39 AM     06/15/2022 04:39 AM    CO2 21 06/15/2022 04:39 AM    BUN 14 06/15/2022 04:39 AM    CREATININE 1 04 06/15/2022 04:39 AM    GLUC 92 06/15/2022 04:39 AM    ALKPHOS 42 2022 05:50 AM    ALT 9 2022 05:50 AM    AST 17 2022 05:50 AM    ALB 2 4 (L) 2022 05:50 AM    TBILI 0 75 2022 05:50 AM    INR 1 05 2022 05:50 AM       Most Recent Vitals:   Vitals:    06/15/22 0658   BP: 117/68   Pulse: 83   Resp: 18   Temp: 98 3 °F (36 8 °C)   SpO2: 98%        Identified Barriers to DC/Discharge Goals/ CM Interventions: no capacity, LOC determination pending, GI bleed, change to PO this evening    Intended Discharge Disposition: SNF - TBD - will need auth    Expected Discharge Date:

## 2022-06-15 NOTE — ASSESSMENT & PLAN NOTE
· POA admission: 4 4, given 2 units of PRBCs and started on IV Protonix in ER  · Recent admission for symptomatic anemia on 05/24/2022 for, EGD during admission with noted ulcer and no active bleeding-was discharged home  · Underwent EGD with GI on 06/12/2022 -showed single large, deep, irregular, benign-appearing ulcer in the gastrojejunal anastomosis with adherent clot  Placed 2 clips successfully, injected 8ml of epinephrine to address bleeding; hemostasis achieved   · Hemoglobin stable as of 6/15AM   2 dark BMs as of 6:15 a m  Noted  Etiology: Upper GI bleed 2/2 to gastric ulcer, status post EGD, treated with Endoclip placement  No signsof active GI bleeding at this time    Plan:   · P o  Pantoprazole 40 mg b i d -1st dose on 06/15 p m  · Communicated with GI team:  Repeat H&H pending at 1300  If hemoglobin trending down, consider repeating EGD  · Diet:  Currently on non ulcer tonic diet   Advance diet as tolerated  · Outpatient EGD in 3 months with GI team  · Follow up on biopsies

## 2022-06-15 NOTE — PHYSICAL THERAPY NOTE
PHYSICAL THERAPY CANCELLATION NOTE          Patient Name: Emily Edmonds  XCSMB'O Date: 6/15/2022         06/15/22 1626   PT Last Visit   PT Visit Date 06/15/22   Note Type   Note Type Cancelled Session   Assessment   Assessment Chart review performed  Attempted to see pt for PT intervention; however, pt politely declined participating in functional mobility/PT at this time due to nausea w/ pt reporting, "I am not doing well right now"  PT will continue to follow and provide PT treatment as appropriate and as schedule allows           Bethany Moya, PT, DPT  06/15/22

## 2022-06-15 NOTE — ASSESSMENT & PLAN NOTE
Lab Results   Component Value Date    SODIUM 136 06/15/2022    SODIUM 132 (L) 06/14/2022    SODIUM 136 06/13/2022     · Encourage PO intake  · Monitor BMP on 6/15 AM-RESOLVED

## 2022-06-15 NOTE — CASE MANAGEMENT
Case Management Discharge Planning Note    Patient name Jesse Griffin  Location W /W -40 MRN [id number]  : 1945 Date 6/15/2022       Current Admission Date: 2022  Current Admission Diagnosis:Acute blood loss anemia   Patient Active Problem List    Diagnosis Date Noted    Neutrophilic leukocytosis     Ambulatory dysfunction 2022    Hematemesis 2022    BRBPR (bright red blood per rectum) 2022    Acute blood loss anemia 2022    NOAH (acute kidney injury) ([facility] [address] ) 2022    Gastric ulcer 2022    Fever 2022    Severe protein-calorie malnutrition ([facility] [address] ) 2022    Anemia 2022    Dyspnea on exertion 2022    Generalized weakness 2022    Abnormal CT scan 2022    H/O bariatric surgery - bypass 2022    Cerumen debris on tympanic membrane of right ear 2022    Nasal congestion 2022    Bilateral hearing loss 2022    Fibromyalgia syndrome 2021    Osteopenia of both forearms 2021    Medicare annual wellness visit, subsequent 2021    Shortness of breath 2021    Acute bronchitis 2021    COVID-19 2021    Dysphasia 2020    Epigastric pain 2020    Hypothyroidism 2020    Gastroesophageal reflux disease without esophagitis 2020    Depression 2020    Fibromyalgia, primary 2020    Iron deficiency 2020    Numbness of foot 2020      LOS (days): 4  Geometric Mean LOS (GMLOS) (days): 4 40  Days to GMLOS:0 6     OBJECTIVE:  Risk of Unplanned Readmission Score: 13 77         Current admission status: Inpatient   Preferred Pharmacy:   [facility] [address] [facility], [address] [place] [address] [address] [facility] [address]  Phone: [phone] Fax: [phone]    Primary Care Provider: Vito Gilford, MD    Primary Insurance: [facility]  Secondary Insurance: DISCHARGE DETAILS:                                                                                                               [address] [place] [address] Number: Submitted SNF auth request to [facility] via [employer], pending ref# [id number] for [facility]  Dr Kari Bentley I [id number] [address] [place] [address] [phone]   Clinicals faxed to [phone]

## 2022-06-15 NOTE — PROGRESS NOTES
Progress Note - Daisy Shea 64 y o  female MRN: [id number]    Unit/Bed#: W -01 Encounter: [id number]        Subjective:   Patient complains of some poor sleep and nausea; had a couple of dark stools today, denies abdominal pain, SOB, chest pain    Objective:     Vitals: Blood pressure 117/68, pulse 83, temperature 98 3 °F (36 8 °C), resp  rate 18, height 5' 4" (1 626 m), weight 50 2 kg (110 lb 10 7 oz), SpO2 98 %  ,Body mass index is 19 kg/m²  Intake/Output Summary (Last 24 hours) at 6/15/2022 1632  Last data filed at 6/15/2022 1235  Gross per 24 hour   Intake 600 ml   Output --   Net 600 ml       Physical Exam:   General appearance: alert, appears stated age and cooperative  Lungs: clear to auscultation bilaterally, no labored breathing/accessory muscle use  Heart: regular rate and rhythm, S1, S2 normal, no murmur, click, rub or gallop  Abdomen: soft, non-tender; bowel sounds normal; no masses,  no organomegaly  Extremities: no edema    Invasive Devices  Report    Peripheral Intravenous Line  Duration           Peripheral IV 06/15/22 Dorsal (posterior); Right Hand <1 day                Lab, Imaging and other studies: I have personally reviewed pertinent reports  No results displayed because visit has over 200 results           Latest Reference Range & Units 06/13/22 18:53 06/14/22 10:44 06/15/22 04:39 06/15/22 13:00   Sodium 135 - 147 mmol/L  132 (L) 136    Potassium 3 5 - 5 3 mmol/L  4 0 3 8    Chloride 96 - 108 mmol/L  106 107    CO2 21 - 32 mmol/L  22 21    Anion Gap 4 - 13 mmol/L  4 8    BUN 5 - 25 mg/dL  15 14    Creatinine 0 60 - 1 30 mg/dL  1 08 [1] 1 04 [2]    Glucose, Random 65 - 140 mg/dL  104 [3] 92 [4]    Calcium 8 4 - 10 2 mg/dL  7 5 (L) 7 8 (L)    eGFR ml/min/1 73sq m  49 51    WBC 4 31 - 10 16 Thousand/uL  10 27 (H) 13 54 (H)    Red Blood Cell Count 3 81 - 5 12 Million/uL  3 34 (L) 3 30 (L)    Hemoglobin 11 5 - 15 4 g/dL  10 1 (L) 9 9 (L) 9 9 (L)   HCT 34 8 - 46 1 %  30 2 (L) 29 6 (L) 30 2 (L)   MCV 82 - 98 fL  90 90    MCH 26 8 - 34 3 pg  30 2 30 0    MCHC 31 4 - 37 4 g/dL  33 4 33 4    RDW 11 6 - 15 1 %  18 4 (H) 17 6 (H)    Platelet Count 486 - 390 Thousands/uL  535 (H) 608 (H)    MPV 8 9 - 12 7 fL  9 1 9 1    nRBC /100 WBCs  0 0    Neutrophils % 43 - 75 %  66 69    Immat GRANS % 0 - 2 %  1 1    Lymphocytes Relative 14 - 44 %  16 17    Monocytes Relative 4 - 12 %  11 9    Eosinophils 0 - 6 %  5 4    Basophils Relative 0 - 1 %  1 0    Immature Grans Absolute 0 00 - 0 20 Thousand/uL  0 11 0 12    Absolute Neutrophils 1 85 - 7 62 Thousands/µL  6 87 9 29 (H)    Lymphocytes Absolute 0 60 - 4 47 Thousands/µL  1 62 2 23    Absolute Monocytes 0 17 - 1 22 Thousand/µL  1 10 1 27 (H)    Absolute Eosinophils 0 00 - 0 61 Thousand/µL  0 52 0 58    Basophils Absolute 0 00 - 0 10 Thousands/µL  0 05 0 05    Crossmatch  Compatible  Compatible  Compatible  Compatible      Unit ABO  Ivette Marus      Unit [facility]  POS  POS  POS  POS      Unit Number  [id number]  [id number]  [id number]  [id number]      Unit Dispense Status  Return to Inv  Return to Inv  Return to Inv  Return to Inv      (L): Data is abnormally low  (H): Data is abnormally high  [1] Standardized to IDMS reference method  [2] Standardized to IDMS reference method  [3] If the patient is fasting, the ADA then defines impaired fasting glucose as > 100 mg/dL and diabetes as > or equal to 123 mg/dL  Specimen collection should occur prior to Sulfasalazine administration due to the potential for falsely depressed results  Specimen collection should occur prior to Sulfapyridine administration due to the potential for falsely elevated results  [4] If the patient is fasting, the ADA then defines impaired fasting glucose as > 100 mg/dL and diabetes as > or equal to 123 mg/dL  Specimen collection should occur prior to Sulfasalazine administration due to the potential for falsely depressed results   Specimen collection should occur prior to Sulfapyridine administration due to the potential for falsely elevated results  Assessment/Plan:    1    Upper GI bleed secondary to anastamotic ulcer; patient reported with dark stools today but these appear reisdual; hgb appeared stable both this morning and on recheck this afternoon    - continue IV protonix    - monitor hemoglobin, transfuse if needed    - antiemetics as needed, symptomatic management    - repeat EGD in 3 months    - nonulcerogenic diet, avoid NSAIDs

## 2022-06-15 NOTE — ASSESSMENT & PLAN NOTE
Lab Results   Component Value Date    WBC 13 54 (H) 06/15/2022    WBC 10 27 (H) 06/14/2022    WBC 12 71 (H) 06/13/2022    NEUTROABS 9 29 (H) 06/15/2022    NEUTROABS 6 87 06/14/2022    NEUTROABS 8 78 (H) 06/13/2022     · Patient noted to have neutrophilic leukocytosis on 86/60 a m  Lab work  · No fevers or clinical signs of infection noted  · Etiology unclear; likely reactive

## 2022-06-15 NOTE — CASE MANAGEMENT
Case Management Assessment & Discharge Planning Note    Patient name Grey Danielle  Location W /W -74 MRN [id number]  : 1945 Date 6/15/2022       Current Admission Date: 2022  Current Admission Diagnosis:Acute blood loss anemia   Patient Active Problem List    Diagnosis Date Noted    Neutrophilic leukocytosis     Ambulatory dysfunction 2022    Hematemesis 2022    BRBPR (bright red blood per rectum) 2022    Acute blood loss anemia 2022    NOAH (acute kidney injury) ([facility] [address] ) 2022    Gastric ulcer 2022    Fever 2022    Severe protein-calorie malnutrition ([facility] [address] ) 2022    Anemia 2022    Dyspnea on exertion 2022    Generalized weakness 2022    Abnormal CT scan 2022    H/O bariatric surgery - bypass 2022    Cerumen debris on tympanic membrane of right ear 2022    Nasal congestion 2022    Bilateral hearing loss 2022    Fibromyalgia syndrome 2021    Osteopenia of both forearms 2021    Medicare annual wellness visit, subsequent 2021    Shortness of breath 2021    Acute bronchitis 2021    COVID-19 2021    Dysphasia 2020    Epigastric pain 2020    Hypothyroidism 2020    Gastroesophageal reflux disease without esophagitis 2020    Depression 2020    Fibromyalgia, primary 2020    Iron deficiency 2020    Numbness of foot 2020      LOS (days): 4  Geometric Mean LOS (GMLOS) (days): 4 40  Days to GMLOS:0 6     OBJECTIVE:  PATIENT READMITTED TO HOSPITAL  Risk of Unplanned Readmission Score: 13 77         Current admission status: Inpatient       Preferred Pharmacy:   [facility] [address] [facility], [address] [place] [address] [address] [address]  Phone: [phone] Fax: [phone]    Primary Care Provider: Tian Hawthorne MD    Primary Insurance: Zeyad Charles [facility] REP  Secondary Insurance:     ASSESSMENT:  Georgina Briceño Proxies    There are no active Health Care Proxies on file  DISCHARGE DETAILS:    Discharge planning discussed with[de-identified] Brother - Davon BRANDON  Freedom of Choice: Yes  Comments - Freedom of Choice: Brother wishes for his sister to go to [facility] for her rehabilitation stay  Her brother Dory Schroeder, who is a psychologist and specializes in psychopharmacology  Dory Schroeder states his sister routinely speaks with him about her mental health and different medication interventions  Dory Schroeder says he will be [place] in country the first week in [place] and will be around to help more with his sister's care decisions  Dory Schroeder can be reached at [phone], he states he can receive short text messages if needed as well  CM contacted family/caregiver?: Yes  Were Treatment Team discharge recommendations reviewed with patient/caregiver?: Yes  Did patient/caregiver verbalize understanding of patient care needs?: Yes  Were patient/caregiver advised of the risks associated with not following Treatment Team discharge recommendations?: Yes    Contacts  Patient Contacts: Austin Moncada  Relationship to Patient[de-identified] Family  Contact Method: Phone  Phone Number: [phone]  Reason/Outcome: Discharge Planning, Referral, Continuity of Care              Other Referral/Resources/Interventions Provided:  Interventions: Short Term Rehab  Referral Comments: CM tasked out auth via [facility] for [facility]           Treatment Team Recommendation: Short Term Rehab  Discharge Destination Plan[de-identified] Short Term Rehab

## 2022-06-15 NOTE — PROGRESS NOTES
[facility]  Progress Note - Christiano Montemayor 1945, 64 y o  female MRN: [id number]  Unit/Bed#: W -01 Encounter: [id number]  Primary Care Provider: Shahzad Chung MD   Date and time admitted to hospital: 6/11/2022  4:40 PM    * Acute blood loss anemia  Assessment & Plan  · POA admission: 4 4, given 2 units of PRBCs and started on IV Protonix in ER  · Recent admission for symptomatic anemia on 05/24/2022 for, EGD during admission with noted ulcer and no active bleeding-was discharged home  · Underwent EGD with GI on 06/12/2022 -showed single large, deep, irregular, benign-appearing ulcer in the gastrojejunal anastomosis with adherent clot  Placed 2 clips successfully, injected 8ml of epinephrine to address bleeding; hemostasis achieved   · Hemoglobin stable as of 6/15AM   2 dark BMs as of 6:15 a m  Noted  Etiology: Upper GI bleed 2/2 to gastric ulcer, status post EGD, treated with Endoclip placement  No signsof active GI bleeding at this time    Plan:   · P o  Pantoprazole 40 mg b i d -1st dose on 06/15 p m  · Communicated with GI team:  Repeat H&H pending at 1300  If hemoglobin trending down, consider repeating EGD  · Diet:  Currently on non ulcer tonic diet  Advance diet as tolerated  · Outpatient EGD in 3 months with GI team  · Follow up on biopsies    Neutrophilic leukocytosis  Assessment & Plan  Lab Results   Component Value Date    WBC 13 54 (H) 06/15/2022    WBC 10 27 (H) 06/14/2022    WBC 12 71 (H) 06/13/2022    NEUTROABS 9 29 (H) 06/15/2022    NEUTROABS 6 87 06/14/2022    NEUTROABS 8 78 (H) 06/13/2022     · Patient noted to have neutrophilic leukocytosis on 00/44 a m  Lab work  · No fevers or clinical signs of infection noted  · Etiology unclear; likely reactive      Hyponatremia-resolved as of 6/15/2022  Assessment & Plan  Lab Results   Component Value Date    SODIUM 136 06/15/2022    SODIUM 132 (L) 06/14/2022    SODIUM 136 06/13/2022     · Encourage PO intake  · Monitor BMP on 6/15 AM-RESOLVED    Hypothyroidism  Assessment & Plan      Lab Results   Component Value Date    [id number] 3 492 05/24/2022    TSH 0 66 11/14/2020     · Continue levothyroxine 112 mcg    Depression  Assessment & Plan  · Continue Klonopin and trazodone  · Seen by Neuropsych- deemed patient to not have capacity to make decisions  · Delirium precautions       Severe protein-calorie malnutrition ([facility] [address] )  Assessment & Plan  Malnutrition Findings:   Adult Malnutrition type: Chronic illness  Adult Degree of Malnutrition: Other severe protein calorie malnutrition  Malnutrition Characteristics: Muscle loss, Weight loss, Fat loss              360 Statement: Severe malnutrition r/t inadequate intake as evidenced by 13% weight loss over 4 months, orbitals hollow, muscle wasting present to temples; nutritional supplements ordered once diet advances           Body mass index is 19 kg/m²  Plan:  · Continue Ensure   · Encourage PO hydration and nutrition   · Dietitian following, appreciate recommendations    Ambulatory dysfunction  Assessment & Plan  · Encourage PO intake  · Continue with PT/OT   · Recommended post acute rehab after discharge      VTE Pharmacologic Prophylaxis: VTE Score: 3 held due to GI bleed    Patient Centered Rounds: I performed bedside rounds with nursing staff today  Discussions with Specialists or Other Care Team Provider: GI, nutrition, ICU team, Geriatrics    Education and Discussions with Family / Patient: Will call family later today to update  Current Length of Stay: 4 day(s)  Current Patient Status: Inpatient   Discharge Plan: Anticipate discharge in 24-48 hrs to post acute rehab    Code Status: Level 1 - Full Code    Subjective:   No adverse events overnight  Pt in positive mood this AM and states she feels "much better" and is anxious to go home  Denies pain, N/V  Appetite is much improved  Advanced to GI non-ulcerogenic diet yesterday and is tolerating well   Pt reported first bowel movement since resolution of BRBPR last night, description consistent with melena  Denies cough, chest pain, SOB  No subjective fever/chills, but complains of feeling cold  Objective:     Vitals:   Temp (24hrs), Av 1 °F (36 7 °C), Min:97 8 °F (36 6 °C), Max:98 3 °F (36 8 °C)    Temp:  [97 8 °F (36 6 °C)-98 3 °F (36 8 °C)] 98 3 °F (36 8 °C)  HR:  [74-91] 83  Resp:  [18] 18  BP: (108-117)/(57-69) 117/68  SpO2:  [97 %-99 %] 98 %  Body mass index is 19 kg/m²  Input and Output Summary (last 24 hours): Intake/Output Summary (Last 24 hours) at 6/15/2022 1231  Last data filed at 6/15/2022 2019  Gross per 24 hour   Intake 890 ml   Output --   Net 890 ml       Physical Exam:   Physical Exam  Constitutional:       General: She is not in acute distress  Appearance: She is cachectic  HENT:      Head: Normocephalic and atraumatic  Cardiovascular:      Rate and Rhythm: Normal rate and regular rhythm  Pulmonary:      Breath sounds: Normal breath sounds  Abdominal:      General: Abdomen is flat  Bowel sounds are normal  There is no distension  Palpations: Abdomen is soft  Tenderness: There is no abdominal tenderness  Musculoskeletal:      Right foot: Swelling present  Left foot: Swelling present  Skin:     General: Skin is warm and dry  Neurological:      Mental Status: She is alert  Psychiatric:         Mood and Affect: Mood and affect normal             Additional Data:     Labs:  Results from last 7 days   Lab Units 06/15/22  0439 22  0102 22  1704   WBC Thousand/uL 13 54*   < > 13 08*   HEMOGLOBIN g/dL 9 9*   < > 4 4*   HEMATOCRIT % 29 6*   < > 14 9*   PLATELETS Thousands/uL 608*   < > 635*   BANDS PCT %  --   --  3   NEUTROS PCT % 69   < >  --    LYMPHS PCT % 17   < >  --    LYMPHO PCT %  --   --  8*   MONOS PCT % 9   < >  --    MONO PCT %  --   --  5   EOS PCT % 4   < > 1    < > = values in this interval not displayed       Results from last 7 days   Lab Units 06/15/22  0439 06/14/22  1044 06/13/22  0550   SODIUM mmol/L 136   < > 136   POTASSIUM mmol/L 3 8   < > 3 7   CHLORIDE mmol/L 107   < > 109*   CO2 mmol/L 21   < > 21   BUN mg/dL 14   < > 20   CREATININE mg/dL 1 04   < > 1 16   ANION GAP mmol/L 8   < > 6   CALCIUM mg/dL 7 8*   < > 7 6*   ALBUMIN g/dL  --   --  2 4*   TOTAL BILIRUBIN mg/dL  --   --  0 75   ALK PHOS U/L  --   --  42   ALT U/L  --   --  9   AST U/L  --   --  17   GLUCOSE RANDOM mg/dL 92   < > 86    < > = values in this interval not displayed  Results from last 7 days   Lab Units 06/13/22  0550   INR  1 05     Results from last 7 days   Lab Units 06/12/22  1910   POC GLUCOSE mg/dl 101         Results from last 7 days   Lab Units 06/12/22  0919   LACTIC ACID mmol/L 0 5       Lines/Drains:  Invasive Devices  Report    Peripheral Intravenous Line  Duration           Peripheral IV 06/15/22 Dorsal (posterior); Right Hand <1 day                Imaging:   No orders to display         Recent Cultures (last 7 days):         Last 24 Hours Medication List:   Current Facility-Administered Medications   Medication Dose Route Frequency Provider Last Rate    acetaminophen  650 mg Oral Q6H PRN Emil Bailey PA-C      clonazePAM  1 5 mg Oral HS Piyush Lynch DO      levothyroxine  112 mcg Oral Early Morning Piyush Lynch DO      ondansetron  4 mg Intravenous Q6H PRN Piyush Lynch DO      pantoprazole  40 mg Oral BID AC Han Murdock MD      traZODone  50 mg Oral HS Divya Feliciano DO          Today, Patient Was Seen By: Han Murdock MD    **Please Note: This note may have been constructed using a voice recognition system  **

## 2022-06-15 NOTE — PLAN OF CARE
Problem: Potential for Falls  Goal: Patient will remain free of falls  Description: INTERVENTIONS:  - Educate patient/family on patient safety including physical limitations  - Instruct patient to call for assistance with activity   - Consult OT/PT to assist with strengthening/mobility   - Keep Call bell within reach  - Keep bed low and locked with side rails adjusted as appropriate  - Keep care items and personal belongings within reach  - Initiate and maintain comfort rounds  - Make Fall Risk Sign visible to staff  - Offer Toileting every  Hours, in advance of need  - Initiate/Maintain alarm  - Obtain necessary fall risk management equipment:   - Apply yellow socks and bracelet for high fall risk patients  - Consider moving patient to room near nurses station  Outcome: Progressing     Problem: MOBILITY - ADULT  Goal: Maintain or return to baseline ADL function  Description: INTERVENTIONS:  -  Assess patient's ability to carry out ADLs; assess patient's baseline for ADL function and identify physical deficits which impact ability to perform ADLs (bathing, care of mouth/teeth, toileting, grooming, dressing, etc )  - Assess/evaluate cause of self-care deficits   - Assess range of motion  - Assess patient's mobility; develop plan if impaired  - Assess patient's need for assistive devices and provide as appropriate  - Encourage maximum independence but intervene and supervise when necessary  - Involve family in performance of ADLs  - Assess for home care needs following discharge   - Consider OT consult to assist with ADL evaluation and planning for discharge  - Provide patient education as appropriate  Outcome: Progressing  Goal: Maintains/Returns to pre admission functional level  Description: INTERVENTIONS:  - Perform BMAT or MOVE assessment daily    - Set and communicate daily mobility goal to care team and patient/family/caregiver     - Collaborate with rehabilitation services on mobility goals if consulted  - Perform Range of Motion 2 times a day  - Reposition patient every 2 hours  - Dangle patient 2 times a day  - Stand patient 2 times a day  - Ambulate patient 2 times a day  - Out of bed to chair 2 times a day   - Out of bed for meals 2 times a day  - Out of bed for toileting  - Record patient progress and toleration of activity level   Outcome: Progressing     Problem: Prexisting or High Potential for Compromised Skin Integrity  Goal: Skin integrity is maintained or improved  Description: INTERVENTIONS:  - Identify patients at risk for skin breakdown  - Assess and monitor skin integrity  - Assess and monitor nutrition and hydration status  - Monitor labs   - Assess for incontinence   - Turn and reposition patient  - Assist with mobility/ambulation  - Relieve pressure over bony prominences  - Avoid friction and shearing  - Provide appropriate hygiene as needed including keeping skin clean and dry  - Evaluate need for skin moisturizer/barrier cream  - Collaborate with interdisciplinary team   - Patient/family teaching  - Consider wound care consult   Outcome: Progressing     Problem: Nutrition/Hydration-ADULT  Goal: Nutrient/Hydration intake appropriate for improving, restoring or maintaining nutritional needs  Description: Monitor and assess patient's nutrition/hydration status for malnutrition  Collaborate with interdisciplinary team and initiate plan and interventions as ordered  Monitor patient's weight and dietary intake as ordered or per policy  Utilize nutrition screening tool and intervene as necessary  Determine patient's food preferences and provide high-protein, high-caloric foods as appropriate       INTERVENTIONS:  - Monitor oral intake, urinary output, labs, and treatment plans  - Assess nutrition and hydration status and recommend course of action  - Evaluate amount of meals eaten  - Assist patient with eating if necessary   - Allow adequate time for meals  - Recommend/ encourage appropriate diets, oral nutritional supplements, and vitamin/mineral supplements  - Order, calculate, and assess calorie counts as needed  - Recommend, monitor, and adjust tube feedings and TPN/PPN based on assessed needs  - Assess need for intravenous fluids  - Provide specific nutrition/hydration education as appropriate  - Include patient/family/caregiver in decisions related to nutrition  Outcome: Progressing

## 2022-06-15 NOTE — ASSESSMENT & PLAN NOTE
Lab Results   Component Value Date    [id number] 3 492 05/24/2022    TSH 0 66 11/14/2020     · Continue levothyroxine 112 mcg

## 2022-06-15 NOTE — ASSESSMENT & PLAN NOTE
Malnutrition Findings:   Adult Malnutrition type: Chronic illness  Adult Degree of Malnutrition: Other severe protein calorie malnutrition  Malnutrition Characteristics: Muscle loss, Weight loss, Fat loss              360 Statement: Severe malnutrition r/t inadequate intake as evidenced by 13% weight loss over 4 months, orbitals hollow, muscle wasting present to temples; nutritional supplements ordered once diet advances           Body mass index is 19 kg/m²       Plan:  · Continue Ensure   · Encourage PO hydration and nutrition   · Dietitian following, appreciate recommendations

## 2022-06-16 ENCOUNTER — HOSPITAL ENCOUNTER (INPATIENT)
Facility: HOSPITAL | Age: 77
LOS: 12 days | Discharge: NURSING, REHAB OR LONG-TERM CARE | DRG: 377 | End: 2022-06-28
Attending: INTERNAL MEDICINE | Admitting: INTERNAL MEDICINE
Payer: COMMERCIAL

## 2022-06-16 VITALS
RESPIRATION RATE: 27 BRPM | WEIGHT: 125.22 LBS | SYSTOLIC BLOOD PRESSURE: 131 MMHG | HEART RATE: 85 BPM | DIASTOLIC BLOOD PRESSURE: 59 MMHG | HEIGHT: 64 IN | TEMPERATURE: 97.5 F | OXYGEN SATURATION: 99 % | BODY MASS INDEX: 21.38 KG/M2

## 2022-06-16 DIAGNOSIS — Z91.19 MEDICAL NON-COMPLIANCE: ICD-10-CM

## 2022-06-16 DIAGNOSIS — Z98.84 H/O BARIATRIC SURGERY: ICD-10-CM

## 2022-06-16 DIAGNOSIS — F41.8 MIXED ANXIETY DEPRESSIVE DISORDER: ICD-10-CM

## 2022-06-16 DIAGNOSIS — E43 SEVERE PROTEIN-CALORIE MALNUTRITION (HCC): Primary | ICD-10-CM

## 2022-06-16 DIAGNOSIS — D62 ACUTE BLOOD LOSS ANEMIA: ICD-10-CM

## 2022-06-16 PROBLEM — K25.9 GASTRIC ULCER: Status: RESOLVED | Noted: 2022-05-28 | Resolved: 2022-06-16

## 2022-06-16 PROBLEM — E83.51 HYPOCALCEMIA: Status: ACTIVE | Noted: 2022-06-16

## 2022-06-16 PROBLEM — N17.9 AKI (ACUTE KIDNEY INJURY) (HCC): Status: RESOLVED | Noted: 2022-06-11 | Resolved: 2022-06-16

## 2022-06-16 PROBLEM — K62.5 BRBPR (BRIGHT RED BLOOD PER RECTUM): Status: RESOLVED | Noted: 2022-06-11 | Resolved: 2022-06-16

## 2022-06-16 PROBLEM — R60.0 BILATERAL LEG EDEMA: Status: ACTIVE | Noted: 2022-06-16

## 2022-06-16 PROBLEM — K92.0 HEMATEMESIS: Status: RESOLVED | Noted: 2022-06-11 | Resolved: 2022-06-16

## 2022-06-16 LAB
ABO GROUP BLD BPU: NORMAL
ANION GAP SERPL CALCULATED.3IONS-SCNC: 9 MMOL/L (ref 4–13)
ATRIAL RATE: 82 BPM
BASOPHILS # BLD AUTO: 0.04 THOUSANDS/ΜL (ref 0–0.1)
BASOPHILS NFR BLD AUTO: 0 % (ref 0–1)
BPU ID: NORMAL
BUN SERPL-MCNC: 23 MG/DL (ref 5–25)
CA-I BLD-SCNC: 1.1 MMOL/L (ref 1.12–1.32)
CALCIUM SERPL-MCNC: 7.7 MG/DL (ref 8.4–10.2)
CHLORIDE SERPL-SCNC: 108 MMOL/L (ref 96–108)
CO2 SERPL-SCNC: 17 MMOL/L (ref 21–32)
CREAT SERPL-MCNC: 1 MG/DL (ref 0.6–1.3)
CROSSMATCH: NORMAL
EOSINOPHIL # BLD AUTO: 0 THOUSAND/ΜL (ref 0–0.61)
EOSINOPHIL NFR BLD AUTO: 0 % (ref 0–6)
ERYTHROCYTE [DISTWIDTH] IN BLOOD BY AUTOMATED COUNT: 16.5 % (ref 11.6–15.1)
ERYTHROCYTE [DISTWIDTH] IN BLOOD BY AUTOMATED COUNT: 17.5 % (ref 11.6–15.1)
GFR SERPL CREATININE-BSD FRML MDRD: 54 ML/MIN/1.73SQ M
GLUCOSE SERPL-MCNC: 115 MG/DL (ref 65–140)
GLUCOSE SERPL-MCNC: 143 MG/DL (ref 65–140)
GLUCOSE SERPL-MCNC: 173 MG/DL (ref 65–140)
HCT VFR BLD AUTO: 21.3 % (ref 34.8–46.1)
HCT VFR BLD AUTO: 22.2 % (ref 34.8–46.1)
HCT VFR BLD AUTO: 23.4 % (ref 34.8–46.1)
HCT VFR BLD AUTO: 24.8 % (ref 34.8–46.1)
HCT VFR BLD AUTO: 25.5 % (ref 34.8–46.1)
HGB BLD-MCNC: 7.4 G/DL (ref 11.5–15.4)
HGB BLD-MCNC: 7.6 G/DL (ref 11.5–15.4)
HGB BLD-MCNC: 7.6 G/DL (ref 11.5–15.4)
HGB BLD-MCNC: 8.4 G/DL (ref 11.5–15.4)
HGB BLD-MCNC: 9 G/DL (ref 11.5–15.4)
IMM GRANULOCYTES # BLD AUTO: 0.23 THOUSAND/UL (ref 0–0.2)
IMM GRANULOCYTES NFR BLD AUTO: 2 % (ref 0–2)
INR PPP: 1.36 (ref 0.84–1.19)
LACTATE SERPL-SCNC: 1.8 MMOL/L (ref 0.5–2)
LYMPHOCYTES # BLD AUTO: 1.97 THOUSANDS/ΜL (ref 0.6–4.47)
LYMPHOCYTES NFR BLD AUTO: 17 % (ref 14–44)
MAGNESIUM SERPL-MCNC: 1.8 MG/DL (ref 1.9–2.7)
MCH RBC QN AUTO: 30 PG (ref 26.8–34.3)
MCH RBC QN AUTO: 30 PG (ref 26.8–34.3)
MCHC RBC AUTO-ENTMCNC: 33.9 G/DL (ref 31.4–37.4)
MCHC RBC AUTO-ENTMCNC: 35.3 G/DL (ref 31.4–37.4)
MCV RBC AUTO: 85 FL (ref 82–98)
MCV RBC AUTO: 89 FL (ref 82–98)
MONOCYTES # BLD AUTO: 0.3 THOUSAND/ΜL (ref 0.17–1.22)
MONOCYTES NFR BLD AUTO: 3 % (ref 4–12)
NEUTROPHILS # BLD AUTO: 8.92 THOUSANDS/ΜL (ref 1.85–7.62)
NEUTS SEG NFR BLD AUTO: 78 % (ref 43–75)
NRBC BLD AUTO-RTO: 0 /100 WBCS
P AXIS: 65 DEGREES
PHOSPHATE SERPL-MCNC: 4 MG/DL (ref 2.3–4.1)
PLATELET # BLD AUTO: 341 THOUSANDS/UL (ref 149–390)
PLATELET # BLD AUTO: 375 THOUSANDS/UL (ref 149–390)
PMV BLD AUTO: 9.7 FL (ref 8.9–12.7)
PMV BLD AUTO: 9.8 FL (ref 8.9–12.7)
POTASSIUM SERPL-SCNC: 4.2 MMOL/L (ref 3.5–5.3)
PR INTERVAL: 92 MS
PROTHROMBIN TIME: 16.7 SECONDS (ref 11.6–14.5)
QRS AXIS: 42 DEGREES
QRSD INTERVAL: 78 MS
QT INTERVAL: 392 MS
QTC INTERVAL: 457 MS
RBC # BLD AUTO: 2.8 MILLION/UL (ref 3.81–5.12)
RBC # BLD AUTO: 3 MILLION/UL (ref 3.81–5.12)
SODIUM SERPL-SCNC: 134 MMOL/L (ref 135–147)
T WAVE AXIS: 36 DEGREES
UNIT DISPENSE STATUS: NORMAL
UNIT PRODUCT CODE: NORMAL
UNIT PRODUCT VOLUME: 280 ML
UNIT PRODUCT VOLUME: 280 ML
UNIT PRODUCT VOLUME: 300 ML
UNIT PRODUCT VOLUME: 350 ML
UNIT RH: NORMAL
VENTRICULAR RATE: 82 BPM
WBC # BLD AUTO: 11.46 THOUSAND/UL (ref 4.31–10.16)
WBC # BLD AUTO: 12.02 THOUSAND/UL (ref 4.31–10.16)

## 2022-06-16 PROCEDURE — 99291 CRITICAL CARE FIRST HOUR: CPT

## 2022-06-16 PROCEDURE — 93010 ELECTROCARDIOGRAM REPORT: CPT | Performed by: INTERNAL MEDICINE

## 2022-06-16 PROCEDURE — 85018 HEMOGLOBIN: CPT

## 2022-06-16 PROCEDURE — NC001 PR NO CHARGE: Performed by: INTERNAL MEDICINE

## 2022-06-16 PROCEDURE — 84100 ASSAY OF PHOSPHORUS: CPT

## 2022-06-16 PROCEDURE — 82948 REAGENT STRIP/BLOOD GLUCOSE: CPT

## 2022-06-16 PROCEDURE — 85610 PROTHROMBIN TIME: CPT

## 2022-06-16 PROCEDURE — 85014 HEMATOCRIT: CPT | Performed by: NURSE PRACTITIONER

## 2022-06-16 PROCEDURE — 85025 COMPLETE CBC W/AUTO DIFF WBC: CPT

## 2022-06-16 PROCEDURE — 85018 HEMOGLOBIN: CPT | Performed by: NURSE PRACTITIONER

## 2022-06-16 PROCEDURE — C9113 INJ PANTOPRAZOLE SODIUM, VIA: HCPCS

## 2022-06-16 PROCEDURE — NC001 PR NO CHARGE: Performed by: PHYSICIAN ASSISTANT

## 2022-06-16 PROCEDURE — 99232 SBSQ HOSP IP/OBS MODERATE 35: CPT | Performed by: NURSE PRACTITIONER

## 2022-06-16 PROCEDURE — 82330 ASSAY OF CALCIUM: CPT

## 2022-06-16 PROCEDURE — 80048 BASIC METABOLIC PNL TOTAL CA: CPT

## 2022-06-16 PROCEDURE — 93005 ELECTROCARDIOGRAM TRACING: CPT

## 2022-06-16 PROCEDURE — 85014 HEMATOCRIT: CPT

## 2022-06-16 PROCEDURE — 99233 SBSQ HOSP IP/OBS HIGH 50: CPT | Performed by: SURGERY

## 2022-06-16 PROCEDURE — C9113 INJ PANTOPRAZOLE SODIUM, VIA: HCPCS | Performed by: PHYSICIAN ASSISTANT

## 2022-06-16 PROCEDURE — 99232 SBSQ HOSP IP/OBS MODERATE 35: CPT | Performed by: INTERNAL MEDICINE

## 2022-06-16 PROCEDURE — 83735 ASSAY OF MAGNESIUM: CPT

## 2022-06-16 RX ORDER — MISOPROSTOL 200 UG/1
200 TABLET ORAL
Status: CANCELLED | OUTPATIENT
Start: 2022-06-16

## 2022-06-16 RX ORDER — LORAZEPAM 2 MG/ML
0.5 INJECTION INTRAMUSCULAR ONCE
Status: COMPLETED | OUTPATIENT
Start: 2022-06-16 | End: 2022-06-16

## 2022-06-16 RX ORDER — CALCIUM GLUCONATE 20 MG/ML
2 INJECTION, SOLUTION INTRAVENOUS ONCE
Status: COMPLETED | OUTPATIENT
Start: 2022-06-16 | End: 2022-06-16

## 2022-06-16 RX ORDER — SUCRALFATE 1 G/1
1 TABLET ORAL
Status: DISCONTINUED | OUTPATIENT
Start: 2022-06-16 | End: 2022-06-16 | Stop reason: HOSPADM

## 2022-06-16 RX ORDER — CALCIUM GLUCONATE 20 MG/ML
1 INJECTION, SOLUTION INTRAVENOUS ONCE
Status: COMPLETED | OUTPATIENT
Start: 2022-06-16 | End: 2022-06-16

## 2022-06-16 RX ORDER — SODIUM CHLORIDE, SODIUM GLUCONATE, SODIUM ACETATE, POTASSIUM CHLORIDE, MAGNESIUM CHLORIDE, SODIUM PHOSPHATE, DIBASIC, AND POTASSIUM PHOSPHATE .53; .5; .37; .037; .03; .012; .00082 G/100ML; G/100ML; G/100ML; G/100ML; G/100ML; G/100ML; G/100ML
125 INJECTION, SOLUTION INTRAVENOUS CONTINUOUS
Status: CANCELLED | OUTPATIENT
Start: 2022-06-16

## 2022-06-16 RX ORDER — SODIUM CHLORIDE, SODIUM GLUCONATE, SODIUM ACETATE, POTASSIUM CHLORIDE, MAGNESIUM CHLORIDE, SODIUM PHOSPHATE, DIBASIC, AND POTASSIUM PHOSPHATE .53; .5; .37; .037; .03; .012; .00082 G/100ML; G/100ML; G/100ML; G/100ML; G/100ML; G/100ML; G/100ML
75 INJECTION, SOLUTION INTRAVENOUS CONTINUOUS
Status: DISCONTINUED | OUTPATIENT
Start: 2022-06-16 | End: 2022-06-21

## 2022-06-16 RX ORDER — ACETAMINOPHEN 325 MG/1
650 TABLET ORAL EVERY 6 HOURS PRN
Status: CANCELLED | OUTPATIENT
Start: 2022-06-16

## 2022-06-16 RX ORDER — ONDANSETRON 2 MG/ML
4 INJECTION INTRAMUSCULAR; INTRAVENOUS EVERY 6 HOURS PRN
Status: DISCONTINUED | OUTPATIENT
Start: 2022-06-16 | End: 2022-06-18

## 2022-06-16 RX ORDER — ACETAMINOPHEN 325 MG/1
650 TABLET ORAL EVERY 6 HOURS PRN
Status: DISCONTINUED | OUTPATIENT
Start: 2022-06-16 | End: 2022-06-28 | Stop reason: HOSPADM

## 2022-06-16 RX ORDER — ONDANSETRON 2 MG/ML
4 INJECTION INTRAMUSCULAR; INTRAVENOUS EVERY 6 HOURS PRN
Status: CANCELLED | OUTPATIENT
Start: 2022-06-16

## 2022-06-16 RX ORDER — SUCRALFATE 1 G/1
1 TABLET ORAL
Status: DISCONTINUED | OUTPATIENT
Start: 2022-06-16 | End: 2022-06-28 | Stop reason: HOSPADM

## 2022-06-16 RX ORDER — SUCRALFATE 1 G/1
1 TABLET ORAL
Status: CANCELLED | OUTPATIENT
Start: 2022-06-16

## 2022-06-16 RX ORDER — MISOPROSTOL 200 UG/1
200 TABLET ORAL
Status: DISCONTINUED | OUTPATIENT
Start: 2022-06-16 | End: 2022-06-28 | Stop reason: HOSPADM

## 2022-06-16 RX ORDER — MISOPROSTOL 200 UG/1
200 TABLET ORAL
Status: DISCONTINUED | OUTPATIENT
Start: 2022-06-16 | End: 2022-06-16 | Stop reason: HOSPADM

## 2022-06-16 RX ADMIN — SODIUM CHLORIDE 8 MG/HR: 9 INJECTION, SOLUTION INTRAVENOUS at 21:56

## 2022-06-16 RX ADMIN — CALCIUM GLUCONATE 2 G: 20 INJECTION, SOLUTION INTRAVENOUS at 00:50

## 2022-06-16 RX ADMIN — LORAZEPAM 0.5 MG: 2 INJECTION INTRAMUSCULAR; INTRAVENOUS at 11:20

## 2022-06-16 RX ADMIN — CALCIUM GLUCONATE 1 G: 20 INJECTION, SOLUTION INTRAVENOUS at 12:35

## 2022-06-16 RX ADMIN — CALCIUM GLUCONATE 1 G: 20 INJECTION, SOLUTION INTRAVENOUS at 05:26

## 2022-06-16 RX ADMIN — SODIUM CHLORIDE, SODIUM GLUCONATE, SODIUM ACETATE, POTASSIUM CHLORIDE, MAGNESIUM CHLORIDE, SODIUM PHOSPHATE, DIBASIC, AND POTASSIUM PHOSPHATE 125 ML/HR: .53; .5; .37; .037; .03; .012; .00082 INJECTION, SOLUTION INTRAVENOUS at 21:55

## 2022-06-16 RX ADMIN — MISOPROSTOL 200 MCG: 200 TABLET ORAL at 22:11

## 2022-06-16 RX ADMIN — SODIUM CHLORIDE 8 MG/HR: 9 INJECTION, SOLUTION INTRAVENOUS at 00:51

## 2022-06-16 RX ADMIN — SUCRALFATE 1 G: 1 TABLET ORAL at 21:54

## 2022-06-16 RX ADMIN — SUCRALFATE 1 G: 1 TABLET ORAL at 16:48

## 2022-06-16 RX ADMIN — SODIUM CHLORIDE 8 MG/HR: 9 INJECTION, SOLUTION INTRAVENOUS at 12:29

## 2022-06-16 RX ADMIN — MISOPROSTOL 200 MCG: 200 TABLET ORAL at 16:48

## 2022-06-16 RX ADMIN — SODIUM CHLORIDE, SODIUM GLUCONATE, SODIUM ACETATE, POTASSIUM CHLORIDE, MAGNESIUM CHLORIDE, SODIUM PHOSPHATE, DIBASIC, AND POTASSIUM PHOSPHATE 125 ML/HR: .53; .5; .37; .037; .03; .012; .00082 INJECTION, SOLUTION INTRAVENOUS at 12:35

## 2022-06-16 RX ADMIN — SODIUM CHLORIDE, SODIUM GLUCONATE, SODIUM ACETATE, POTASSIUM CHLORIDE, MAGNESIUM CHLORIDE, SODIUM PHOSPHATE, DIBASIC, AND POTASSIUM PHOSPHATE 125 ML/HR: .53; .5; .37; .037; .03; .012; .00082 INJECTION, SOLUTION INTRAVENOUS at 00:39

## 2022-06-16 NOTE — PROGRESS NOTES
Progress Note - Azael Seals 64 y o  female MRN: [id number]    Unit/Bed#: ICU 05 Encounter: [id number]    Assessment and Plan:   Principal Problem:    Acute blood loss anemia  Active Problems:    Hypothyroidism    Depression    Hyponatremia    H/O bariatric surgery - bypass    Gastric ulcer    Ambulatory dysfunction    Neutrophilic leukocytosis    Hypocalcemia    Severe protein-calorie malnutrition ([facility] [address] )    #1  Upper GI bleeding and anemia secondary to large anastomotic ulcer:  Patient was status post EGD with treatment on 6/12 and then had another episode of maroon stool last night with hypotension and drop in hemoglobin  Status post repeat EGD emergently yesterday evening with rebleeding from gastrojejunal ulcer which was treated with epi injection and cauterization  Hemoglobin today is stable  -at this point, there would be minimal utility in recurrent EGD treatments  Would recommend IR evaluation if patient has signs of rebleeding  Also would recommend transfer to \[facility]\"" for bariatric surgery evaluation at that time   -continue PPI drip  -clear liquid diet  -would defer duration of Cytotec to surgery  -monitor hemoglobin and bowel movements closely    ----------------------------------------------------------------------------------------------------------------    Subjective:     Hemoglobin stable  Patient does not believe she has had any bowel movement since her procedure  No vomiting  Minimal abdominal pain    Objective:     Vitals: Blood pressure 122/60, pulse 91, temperature (!) 97 4 °F (36 3 °C), temperature source Oral, resp  rate (!) 29, height 5' 4" (1 626 m), weight 56 8 kg (125 lb 3 5 oz), SpO2 99 %  ,Body mass index is 21 49 kg/m²        Intake/Output Summary (Last 24 hours) at 6/16/2022 1329  Last data filed at 6/16/2022 1201  Gross per 24 hour   Intake 2908 75 ml   Output --   Net 2908 75 ml       Physical Exam:     General Appearance: Alert, appears stated age and cooperative; thin, cachectic  Lungs: Clear to auscultation bilaterally, no rales or rhonchi, no labored breathing/accessory muscle use  Heart: Regular rate and rhythm, S1, S2 normal, no murmur, click, rub or gallop  Abdomen: Soft, non-tender, non-distended; bowel sounds normal; no masses or no organomegaly  Extremities: No cyanosis, clubbing, or edema    Invasive Devices  Report    Peripheral Intravenous Line  Duration           Peripheral IV 06/15/22 Distal;Left;Ventral (anterior) Forearm 1 day    Peripheral IV 06/16/22 Left;Proximal;Ventral (anterior) Forearm <1 day                Lab Results:  Results from last 7 days   Lab Units 06/16/22  0437   WBC Thousand/uL 11 46*   HEMOGLOBIN g/dL 9 0*   HEMATOCRIT % 25 5*   PLATELETS Thousands/uL 341   NEUTROS PCT % 78*   LYMPHS PCT % 17   MONOS PCT % 3*   EOS PCT % 0     Results from last 7 days   Lab Units 06/16/22  0437 06/14/22  1044 06/13/22  0550   POTASSIUM mmol/L 4 2   < > 3 7   CHLORIDE mmol/L 108   < > 109*   CO2 mmol/L 17*   < > 21   BUN mg/dL 23   < > 20   CREATININE mg/dL 1 00   < > 1 16   CALCIUM mg/dL 7 7*   < > 7 6*   ALK PHOS U/L  --   --  42   ALT U/L  --   --  9   AST U/L  --   --  17    < > = values in this interval not displayed  Invalid input(s): BILI  Results from last 7 days   Lab Units 06/16/22  0437   INR  1 36*           Imaging Studies: I have personally reviewed pertinent imaging studies  EGD    Result Date: 6/15/2022  Impression: Rebleeding from gastrojejunal ulcer, treated with epi injection and cauterization, no active bleeding at the end of the procedure  RECOMMENDATION: Schedule follow-up procedure for continued treatment  Continue PPI ivgtt for 72 hours Monitor hgb Transfer to ICU  If patient has recurrent bleeding, will need IR evaluation  Surgery service on board, bariatric surgeons consulted on the patient    Vandana Mac MD     EGD    Result Date: 6/12/2022  Impression: A large ulcer in the gastric pouch had the gastroenteric anastomosis, large adherent blood clot in the base of the ulcer  This was washed off, small area was left  Epinephrine injected in the base of the ulcer around the adherent clot thereafter 2 Endoclips were placed  No active bleeding was seen  The small bowel loop was normal RECOMMENDATION: Schedule repeat EGD in 3 months  Continue monitoring H&H  Take PPI twice a day  Avoid aspirin and NSAIDs  Patient has a very deep large ulcer, have significant probability of recurrent bleeding and or delayed healing     Renee Miller MD

## 2022-06-16 NOTE — DISCHARGE SUMMARY
[facility]  Discharge- Terry Him 1945, 64 y o  female MRN: [id number]  Unit/Bed#: ICU 05 Encounter: [id number]  Primary Care Provider: Jordon Hall MD   Date and time admitted to hospital: 6/11/2022  4:40 PM    * Acute blood loss anemia  Assessment & Plan  ASSESSMENT  · Admitted on 6/11 with acute blood loss anemia with an Hgb of 4 4 in ED given 2 units pRBC's  · Recent admission (05/24) for symptomatic anemia and discharged with Hgb of 8 3     · Hx of Gastric ulcers, see below  · Currently not on any AC/AP at home  · Started on IV Protonix and consulted GI  · She did receive an additional 2 units PRBC's on 6/12 for anemia hgb 5 9   · Pt underwent an EGD on 6/12 which showed a single large, deep, irregular, benign-appearing ulcer in the gastrojejunal anastomosis with adherent clot  Placed 2 clips, injected epinephrine, hemostasis achieved  -  · ICU AP called by [facility] last night to evaluate pt  · Had a large maroon/burgandy BM which was followed by hypotension (80's/40's) and tachycardia (110's)  · Pt complaining of lower ABD pain, cramping in nature  · Pt appeared pale, tachypneic and weak  · STAT labs: Hgb 6 2, down from 9 9 @ 1300  · Lactic 3 6  · GI already following, was called to evaluate and she was taken for an emergent EGD  · (06/15):  EGD Single large, deep, irregular ulcer in the gastrojejunal anastomosis with nonbleeding visible vessel  Induced coagulation and hemostasis achieved with 2 applications of bipolar cautery, epinephrine injection; hemostasis achieved    · Surgery consulted Prior to EGD  · Received 1L NSS and 250mL Albumin and 2 units PRBC's  · Given 80mg IV Protonix and started on Protonix gtt @ 8mg/hr  · Total products: 6 units PRBC's  · (06/16): PICC consent  · Surgery recommend Misoprostol 200mg PO AC/HS, will hold for now while  PLAN  · Continue to trend H/H q4h  · Transfuse for Hgb < 7 or active bleeding, s/s hemodynamic instability  · Follow up lactate   · Continue Isolyte @ 125cc/hr  · Maintain 2 large bore IV's, pt a difficult stick may need a PICC line  · Monitor VS  · Consider Gen surg recommendations for bariatric surgery /elective surgery  · Continue NPO  · Zofran PRN for N/V  · GI following  · Acute GI blood loss anemia, peptic ulcer disease, gastric ulcer, hematemesis, bariatric surgery status-   s/p EGD x3 with anastomotic ulcer s/p hemoclip, bicap and epi  Transfer to bariatric service at [address] [place] [address] planned  Monitor hemoglobin, transfuse as needed  Appreciate surgery input  Would recommend IR vs surgical intervention if she re-bleeds  Continue PPI drip and carafate  · Follow up on biopsies      Gastric ulcer  Assessment & Plan  · Recent admission for symptomatic anemia  EGD during that admission with noted ulcer, no active bleeding  Patient was to have follow up EGD and to continue PPI and Carafate  · EGD 5/26/2022: Single large, deep, irregular, benign-appearing ulcer in the gastric pouch and gastrojejunal anastomosis; performed cold forceps biopsy  Large ulcer noted at the anastomosis/distal gastric pouch  Staples noted in this area  No active bleeding from the ulceration  The ulceration is large measuring at least 2 5 x 3 cm  · EGD 7/2021: Signs of previous gastric bypass surgery in the stomach  History of [place] en y gastric bypass  Mild erythematous mucosa in the body of the stomach and antrum; performed cold forceps biopsy  Biopsies obtained to rule out H  Pylori  The jejunum appeared normal  · EGD 9/2019: Signs of previous gastric bypass surgery in the stomach  History of [place] en y gastric bypass  Multiple cratered, linear, benign-appearing ulcers in the body of the stomach and anastomosis of the stomach  The jejunum appeared normal  · Acute GI blood loss anemia, peptic ulcer disease, gastric ulcer, hematemesis, bariatric surgery status-   s/p EGD x3 with anastomotic ulcer s/p hemoclip, bicap and epi   Transfer to bariatric service at [facility] planned  Monitor hemoglobin, transfuse as needed  Appreciate surgery input  Would recommend IR vs surgical intervention if she re-bleeds  Continue PPI drip and carafate  Hypocalcemia  Assessment & Plan  · Ionized calcium 0 98 in AM   · Likely d/t blood transfusions  · Asymptomatic  · Repleted with 2g calcium gluconate  · Repeat iCal with AM labs    Hyponatremia  Assessment & Plan  · Sodium 134 on most recent labs  · Has had issues with hyponatremia this admission  · Continue Isolyte while NPO  · Repeat BMP in AM  · Continue to monitor    Neutrophilic leukocytosis  Assessment & Plan  · Patient noted to have neutrophilic leukocytosis on 06/92 AM labs  · No fevers or clinical signs of infection noted  · Likely reactive  · Repeat CBC in AM        Ambulatory dysfunction  Assessment & Plan  · Likely related to malnutrition and deconditioning  · Continue with PT/OT   · Recommended post acute rehab after discharge      H/O bariatric surgery - bypass  Assessment & Plan  · Patient reports remote history of Savage-en-Y gastric bypass approximately 12 years ago, at [facility]   · She states she has not been taking vitamin supplementation for some time now      Depression  Assessment & Plan  · Hold home Klonopin and Trazodone  · Seen by Neuropsych, deemed patient to not have capacity to make decisions  · Delirium precautions   · CAM-ICU       Hypothyroidism  Assessment & Plan  Lab Results   Component Value Date    [id number] 3 492 05/24/2022     · Hold home Synthroid, resume once cleared for PO by GI    Severe protein-calorie malnutrition ([facility] [address] )  Assessment & Plan  Malnutrition Findings:   Adult Malnutrition type: Chronic illness  Adult Degree of Malnutrition: Other severe protein calorie malnutrition  Malnutrition Characteristics: Muscle loss, Weight loss, Fat loss                  360 Statement: Severe malnutrition r/t inadequate intake as evidenced by 13% weight loss over 4 months, orbitals hollow, muscle wasting [facility]; nutritional supplements ordered once diet advances    BMI Findings: Body mass index is 21 49 kg/m²  · Consult nutrition   · Encourage PO intake  · Nutritional supplements               Medical Problems             Resolved Problems  Date Reviewed: 6/16/2022          Resolved    Severe protein-calorie malnutrition ([facility] ) 6/16/2022     Resolved by  TOLU Monzon    Hematemesis 6/16/2022     Resolved by  TOLU Monzon    BRBPR (bright red blood per rectum) 6/16/2022     Resolved by  TOLU Monzon    NOAH (acute kidney injury) ([facility] [address] ) 6/16/2022     Resolved by  Samira Mendez, [address]                Admission Date:   Admission Orders (From admission, onward)     Ordered        06/11/22 1941  Inpatient Admission  Once                        Admitting Diagnosis: Hematemesis [K92 0]  Acute blood loss anemia [D62]  Anemia [D64 9]  Hypotension [I95 9]  BRBPR (bright red blood per rectum) [K62 5]  Hemoptysis [R04 2]    HPI: 64 female with a PMHx: Savage-en-Y gastric bypass, symptomatic anemia requiring admission, gastric ulcer with multiple EGD's, hypothyroidism, depression, malnutrition  Initially presented to the ED on 6/11 who presents with hematemesis and blood per rectum  In the ED she was noted to have BRBPR, initial labs Hgb 4 4 down from 8 3 upon discharge of a recent admission on 5/28/2022  She was also noted to have NOAH on admission which has since resolved  In the ED she received 2 units PRBCs, bolused with Protonix and started on an infusion dmitted to the ICU as SD1  She did receive an additional 2 units PRBC's on 6/12 for anemia hgb 5 9  GI was consulted and she underwent an EGD on 6/12 which showed single large, deep, irregular, benign-appearing ulcer in the gastrojejunal anastomosis with adherent clot placed 2 clips, injected epinephrine and achieved homeostasis  She was transferred to [place] [address] on 6/13                  Last night I was called by [facility] provider to evaluate her  She had a large maroon/burgandy BM which was followed by hypotension (80's/40's) and tachycardia (110's)  Upon exam she was pale, diaphoretic, tachypneic, complaining of lower abdominal pain cramping in nature  STAT labs were sent and Hgb had dropped to 6 2 down from 9 9 at 1300 earlier in the afternoon  Lactate was elevated at 3 6  GI has been following since admission, they were called and notified  In the interim she was given 1L NSS, 250 Albumin and was receiving 1 unit PRBC  Pt went for an emergent EGD which showed a single large, deep, irregular ulcer in the gastrojejunal anastomosis with nonbleeding visible vessel  Induced coagulation and hemostasis achieved with 2 applications of bipolar cautery, epinephrine injection  During the EGD she was given an additional unit of PRBCs for a total of 2 today  Post EGD on 6/15 she was transferred to the ICU as SD1 for closer monitoring  Procedures Performed:   Orders Placed This Encounter   Procedures   [facility] [employer] ED ECG Documentation Only       Summary of Hospital Course: see above  GI recommended transfer to [facility] on 6/16 for monitoring by Bariatric surgery     Significant Findings, Care, Treatment and Services Provided:   6/11: 2 u PRBC  6/12: 2 u PRBC  6/12 EGD - single large, deep, irregular, benign-appearing ulcer in the gastrojejunal anastomosis with adherent clot  Placed 2 clips, injected epinephrine, hemostasis achieved  6/15 EGD - Single large, deep, irregular ulcer in the gastrojejunal anastomosis with nonbleeding visible vessel  Induced coagulation and hemostasis achieved with 2 applications of bipolar cautery, epinephrine injection; hemostasis achieved  Complications: none    Condition at Discharge: fair         Discharge instructions/Information to patient and family:   See after visit summary for information provided to patient and family        Provisions for Follow-Up Care:  See after visit summary for information related to follow-up care and any pertinent home health orders  PCP: Flaquita Erwin MD    [facility]     Planned Readmission: No    Discharge Statement   I spent 25 minutes discharging the patient  This time was spent on the day of discharge  I had direct contact with the patient on the day of discharge  Additional documentation is required if more than 30 minutes were spent on discharge  Discharge Medications:  See after visit summary for reconciled discharge medications provided to patient and family          Paige Santiago PA-C

## 2022-06-16 NOTE — ASSESSMENT & PLAN NOTE
· Ionized calcium 0 98  · Likely d/t blood transfusions  · Asymptomatic  · Replete with 2g calcium gluconate  · Repeat iCal with AM labs

## 2022-06-16 NOTE — ASSESSMENT & PLAN NOTE
ASSESSMENT  · Admitted on 6/11 with acute blood loss anemia with an Hgb of 4 4 in ED given 2 units pRBC's  · Recent admission (05/24) for symptomatic anemia and discharged with Hgb of 8 3     · Hx of Gastric ulcers, see below  · Currently not on any AC/AP at home  · Started on IV Protonix and consulted GI  · She did receive an additional 2 units PRBC's on 6/12 for anemia hgb 5 9   · Pt underwent an EGD on 6/12 which showed a single large, deep, irregular, benign-appearing ulcer in the gastrojejunal anastomosis with adherent clot  Placed 2 clips, injected epinephrine, hemostasis achieved  -  · ICU AP called by [facility] last night to evaluate pt  · Had a large maroon/burgandy BM which was followed by hypotension (80's/40's) and tachycardia (110's)  · Pt complaining of lower ABD pain, cramping in nature  · Pt appeared pale, tachypneic and weak  · STAT labs: Hgb 6 2, down from 9 9 @ 1300  · Lactic 3 6  · GI already following, was called to evaluate and she was taken for an emergent EGD  · (06/15):  EGD Single large, deep, irregular ulcer in the gastrojejunal anastomosis with nonbleeding visible vessel  Induced coagulation and hemostasis achieved with 2 applications of bipolar cautery, epinephrine injection; hemostasis achieved    · Surgery consulted Prior to EGD  · Received 1L NSS and 250mL Albumin and 2 units PRBC's  · Given 80mg IV Protonix and started on Protonix gtt @ 8mg/hr  · Total products: 6 units PRBC's  · (06/16): PICC consent  · Surgery recommend Misoprostol 200mg PO AC/HS, will hold for now while  PLAN  · Continue to trend H/H q6h  · Transfuse for Hgb < 7 or active bleeding, s/s hemodynamic instability  · Follow up lactate   · Continue Isolyte @ 125cc/hr  · Maintain 2 large bore IV's, pt a difficult stick may need a PICC line  · Monitor VS  · Consider Gen surg recommendations for bariatric surgery /elective surgery  · Continue NPO  · Zofran PRN for N/V  · Outpatient EGD in 3 months with GI team  · Follow up on biopsies

## 2022-06-16 NOTE — ANESTHESIA PREPROCEDURE EVALUATION
Procedure:  EGD    Relevant Problems   CARDIO   (+) Dyspnea on exertion      ENDO   (+) Hypothyroidism      GI/HEPATIC   (+) BRBPR (bright red blood per rectum)   (+) Gastric ulcer   (+) Gastroesophageal reflux disease without esophagitis   (+) H/O bariatric surgery - bypass   (+) Hematemesis      /RENAL   (+) NOAH (acute kidney injury) (HCC)      HEMATOLOGY   (+) Acute blood loss anemia   (+) Anemia      MUSCULOSKELETAL   (+) Fibromyalgia syndrome   (+) Fibromyalgia, primary      NEURO/PSYCH   (+) Depression   (+) Fibromyalgia syndrome   (+) Fibromyalgia, primary      PULMONARY   (+) Dyspnea on exertion   (+) Shortness of breath        Physical Exam    Airway    Mallampati score: II  TM Distance: >3 FB  Neck ROM: full     Dental       Cardiovascular      Pulmonary      Other Findings        Anesthesia Plan  ASA Score- 3 Emergent    Anesthesia Type- general with ASA Monitors  Additional Monitors:   Airway Plan: ETT  Comment: Hgb 6 3  Received 1 unit PRBC prior to OR  Plan to infuse 1 additional PRBC unit in OR  Patient nauseous and likely UGIB  Plan for RSI and ETT  Plan Factors-Exercise tolerance (METS): >4 METS  Chart reviewed  EKG reviewed  Imaging results reviewed  Existing labs reviewed  Patient summary reviewed  Patient is not a current smoker  Induction- intravenous and rapid sequence induction  Postoperative Plan- Plan for postoperative opioid use  Planned trial extubation    Informed Consent- Anesthetic plan and risks discussed with patient  I personally reviewed this patient with the CRNA  Discussed and agreed on the Anesthesia Plan with the CRNA  Edouard Ahuja Anesthesia plan and consent discussed with Sandford Osgood who expressed understanding and agreement  Risks/benefits and alternatives discussed with patient including possible PONV, sore throat, damage to teeth/lips/gums and possibility of rare anesthetic and surgical emergencies

## 2022-06-16 NOTE — ASSESSMENT & PLAN NOTE
· Recent admission for symptomatic anemia  EGD during that admission with noted ulcer, no active bleeding  Patient was to have follow up EGD and to continue PPI and Carafate  · EGD 5/26/2022: Single large, deep, irregular, benign-appearing ulcer in the gastric pouch and gastrojejunal anastomosis; performed cold forceps biopsy  Large ulcer noted at the anastomosis/distal gastric pouch  Staples noted in this area  No active bleeding from the ulceration  The ulceration is large measuring at least 2 5 x 3 cm  · EGD 7/2021: Signs of previous gastric bypass surgery in the stomach  History of [place] en y gastric bypass  Mild erythematous mucosa in the body of the stomach and antrum; performed cold forceps biopsy  Biopsies obtained to rule out H  Pylori  The jejunum appeared normal  · EGD 9/2019: Signs of previous gastric bypass surgery in the stomach  History of [place] en y gastric bypass  Multiple cratered, linear, benign-appearing ulcers in the body of the stomach and anastomosis of the stomach   The jejunum appeared normal  · See above for current plan

## 2022-06-16 NOTE — CONSULTS
Consultation - General Surgery  : RA Surgery Resident role on Hermes Sauer 64 y o  female MRN: [id number]  Unit/Bed#: W -01 Encounter: [id number]        Assessment:  64y o  year old female with UGIB    Plan:  Upgrade to ICU level of care  Trend LA, Hgb  Per GI, PPI but will hold off on carafate  Per bariatric surgery team, cytotec PO  Monitor for signs of recurrent GIB ie hypotension, hematemasis  Hold pharmacologic DVT ppx    Discussed with on-call bariatric surgery fellow that EGD then IR would be first/second-line therapy for bleeding  Would prefer to avoid an emergent operation / revision but could consider transfer to [facility] for bariatric surgery team evaluation if necessary  HPI:  José Miguel Almanza is a 64 y o  female with a history of RnY GB for weight loss (2012, [facility]), HTN, hypothyroidism, GERD  She had epigastric pain/burning in 9/2019 prompting EGD which showed linear non-bleeding ulcers near her GJ anastomosis  C-scope at that time was normal  F/u EGD in 5/2021 for ongoing pain despite PPI was notable for healed ulcers  Patient had an admission from 5/24-5/28 last month with symptomatic anemia  She denied dark/bloody stools or emesis and was noted to have a non-bleeding 2 5 x 3cm ulcer near her [address] [place] [address] Avenue  She received one unit of blood and was discharged with a hgb of 8 3  She was readmitted on 6/11 with BRBPR and found to have hgb of 4 4  She was transfused one unit of blood on 6/11 with response to 7 3 early morning of 6/12  F/u AM hgb morning of 6/12 was 5 9 prompting a second unit of blood to be transfused, with response to 10 2  She was taken for EGD on 6/12 which showed a clot at the base of the ulcer, which was treated with epinephrine and two endoclips (no active bleeding seen)  Her hgb remained stable from 6/12 through the morning of 6/15 in the high 9s / low 10s  Earlier this evening she was noted to have a large melanotic stool   Her BP was 29O systolic and hgb was 6 2 prompting call to gastroenterology for evaluation for intervention, ICU for upgraded level of care, and general surgery  Gastroenterology attending came in for emergent EGD  Patient was seen during resuscitation in her room, endorsed some abdominal discomfort, nausea, and dizziness  Full set of labs were drawn, revealing lactic acidosis, and a normal coagulation profile on TEG  First unit of RBC administered in her room and en-route to OR for EGD  She was accompanied to OR for her EGD, where a second unit of blood and 250 of albumin was administered  Her hemodynamics improved after this resuscitation and post-procedure labs were checked in PACU  Physical Exam  Constitutional:       General: She is not in acute distress  Appearance: She is ill-appearing and diaphoretic  HENT:      Head: Normocephalic and atraumatic  Right Ear: External ear normal       Left Ear: External ear normal       Nose: Nose normal       Mouth/Throat:      Mouth: Mucous membranes are moist       Pharynx: Oropharynx is clear  Eyes:      General:         Right eye: No discharge  Left eye: No discharge  Extraocular Movements: Extraocular movements intact  Conjunctiva/sclera: Conjunctivae normal       Pupils: Pupils are equal, round, and reactive to light  Cardiovascular:      Rate and Rhythm: Normal rate  Pulses: Normal pulses  Heart sounds: Normal heart sounds  Pulmonary:      Effort: Pulmonary effort is normal  No respiratory distress  Abdominal:      General: Abdomen is flat  There is no distension  Tenderness: There is no abdominal tenderness (Benign abdominal exam)  There is no guarding or rebound  Musculoskeletal:         General: No swelling, tenderness or signs of injury  Cervical back: Normal range of motion and neck supple  No rigidity or tenderness  Skin:     Coloration: Skin is not jaundiced  Findings: No lesion     Neurological: General: No focal deficit present  Mental Status: She is oriented to person, place, and time  Mental status is at baseline  Psychiatric:         Mood and Affect: Mood normal          Behavior: Behavior normal             Review of Systems   Constitutional: Negative for chills and fever  HENT: Negative for ear pain and sore throat  Eyes: Negative for pain and visual disturbance  Respiratory: Negative for cough and shortness of breath  Cardiovascular: Negative for chest pain and palpitations  Gastrointestinal: Positive for abdominal pain, blood in stool, diarrhea and nausea  Negative for vomiting  Genitourinary: Negative for dysuria and hematuria  Musculoskeletal: Negative for arthralgias and back pain  Skin: Negative for color change and rash  Neurological: Positive for dizziness  Negative for seizures and syncope  All other systems reviewed and are negative  Objective         Intake/Output Summary (Last 24 hours) at 6/15/2022 2216  Last data filed at 6/15/2022 2206  Gross per 24 hour   Intake 1420 ml   Output --   Net 1420 ml       First Vitals:   Blood Pressure: 90/50 (06/11/22 1700)  Pulse: 92 (06/11/22 1700)  Temperature: 98 1 °F (36 7 °C) (06/11/22 1706)  Temp Source: Oral (06/11/22 1930)  Respirations: 22 (06/11/22 1700)  Height: 5' 4" (162 6 cm) (06/11/22 2055)  Weight - Scale: 50 9 kg (112 lb 3 4 oz) (06/11/22 2018)  SpO2: 100 % (06/11/22 1700)    Current Vitals:   Blood Pressure: (!) 86/47 (06/15/22 2115)  Pulse: 83 (06/15/22 2115)  Temperature: 98 8 °F (37 1 °C) (06/15/22 2054)  Temp Source: Oral (06/13/22 1100)  Respirations: 16 (06/15/22 2054)  Height: 5' 4" (162 6 cm) (06/11/22 2055)  Weight - Scale: 50 2 kg (110 lb 10 7 oz) (06/15/22 0600)  SpO2: 100 % (06/15/22 2115)    Invasive Devices  Report    Peripheral Intravenous Line  Duration           Peripheral IV 06/15/22 Distal;Left;Ventral (anterior) Forearm <1 day    Peripheral IV 06/15/22 Dorsal (posterior); Right Hand <1 day    Peripheral IV 06/15/22 Right Antecubital <1 day          Airway  Duration           ETT  Oral;Cuffed;Straight;Pre-curved; Inflated 6 5 mm <1 day                Imaging: I have personally reviewed pertinent reports  EGD    Result Date: 6/12/2022  Impression: A large ulcer in the gastric pouch had the gastroenteric anastomosis, large adherent blood clot in the base of the ulcer  This was washed off, small area was left  Epinephrine injected in the base of the ulcer around the adherent clot thereafter 2 Endoclips were placed  No active bleeding was seen  The small bowel loop was normal RECOMMENDATION: Schedule repeat EGD in 3 months  Continue monitoring H&H  Take PPI twice a day  Avoid aspirin and NSAIDs  Patient has a very deep large ulcer, have significant probability of recurrent bleeding and or delayed healing  April Dang MD       EKG, Pathology, and Other Studies: I have personally reviewed pertinent reports      VTE Pharmacologic Prophylaxis: RX contraindicated due to: GIB  VTE Mechanical Prophylaxis: sequential compression device    Historical Information   Past Medical History:   Diagnosis Date    Anemia     Anxiety     Bipolar disorder (HCC)     Colon polyp     Disease of thyroid gland     Essential hypertension     Essential tremor     Fibromyalgia, primary     GERD (gastroesophageal reflux disease)     Inflammatory polyarthropathy (HCC)     Mammogram abnormal      Past Surgical History:   Procedure Laterality Date    BREAST BIOPSY Right 2015    benign    CARPAL TUNNEL RELEASE Bilateral     COLONOSCOPY      EGD AND COLONOSCOPY  01/01/2014    GASTRIC BYPASS  07/01/2012    MAMMO NEEDLE LOCALIZATION RIGHT (ALL INC) Right 2/6/2012    MAMMO NEEDLE LOCALIZATION RIGHT (ALL INC) Right 2/6/2012    ULNAR NERVE TRANSPOSITION Right      Social History   Social History     Substance and Sexual Activity   Alcohol Use Yes    Alcohol/week: 4 0 standard drinks    Types: 4 Glasses of wine per week    Comment: rare     Social History     Substance and Sexual Activity   Drug Use Never     Social History     Tobacco Use   Smoking Status Former Smoker    Quit date:     Years since quittin 4   Smokeless Tobacco Never Used     Family History   Problem Relation Age of Onset    No Known Problems Mother     No Known Problems Father     No Known Problems Sister     No Known Problems Maternal Grandmother     No Known Problems Maternal Grandfather     No Known Problems Paternal Grandmother     No Known Problems Paternal Grandfather     No Known Problems Sister     No Known Problems Sister     Stomach cancer Maternal Aunt     No Known Problems Maternal Aunt     No Known Problems Maternal Aunt     No Known Problems Maternal Aunt     No Known Problems Paternal Aunt     Leukemia Other        Meds/Allergies   all current active meds have been reviewed, current meds:   Current Facility-Administered Medications   Medication Dose Route Frequency    acetaminophen (TYLENOL) tablet 650 mg  650 mg Oral Q6H PRN    clonazePAM (KlonoPIN) tablet 1 5 mg  1 5 mg Oral HS    levothyroxine tablet 112 mcg  112 mcg Oral Early Morning    misoprostol (CYTOTEC) tablet 200 mcg  200 mcg Oral 4x Daily (with meals and at bedtime)    ondansetron (ZOFRAN) injection 4 mg  4 mg Intravenous Q6H PRN    pantoprazole (PROTONIX) 80 mg in sodium chloride 0 9 % 100 mL infusion  8 mg/hr Intravenous Continuous    sodium chloride 0 9 % bolus 1,000 mL  1,000 mL Intravenous Once    sodium chloride 0 9 % infusion  125 mL/hr Intravenous Continuous    sucralfate (CARAFATE) tablet 1 g  1 g Oral 4x Daily (AC & HS)    traZODone (DESYREL) tablet 50 mg  50 mg Oral HS     Facility-Administered Medications Ordered in Other Encounters   Medication Dose Route Frequency    albumin human (FLEXBUMIN) 5 % injection   Intravenous Continuous PRN    dexamethasone (PF) (DECADRON) injection   Intravenous PRN    ePHEDrine injection Intravenous PRN    lidocaine (PF) (XYLOCAINE-MPF) 1 % injection   Intravenous PRN    phenylephrine bolus from bag   Intravenous PRN    propofol (DIPRIVAN) 200 MG/20ML bolus injection   Intravenous PRN    Succinylcholine Chloride 100 mg/5 mL syringe   Intravenous PRN    and PTA meds:   Prior to Admission Medications   Prescriptions Last Dose Informant Patient Reported? Taking? clonazePAM (KlonoPIN) 1 mg tablet 6/10/2022 at Unknown time Self Yes Yes   Sig: TK 3 TS PO Q NIGHT   levothyroxine 112 mcg tablet 6/11/2022 at Unknown time Self Yes Yes   Sig: TK 1 T PO QD   pantoprazole (PROTONIX) 40 mg tablet 6/11/2022 at Unknown time  No Yes   Sig: Take 1 tablet (40 mg total) by mouth 2 (two) times a day before meals   sucralfate (CARAFATE) 1 g tablet 6/11/2022 at Unknown time  No Yes   Sig: Take 1 tablet (1 g total) by mouth 4 (four) times a day (before meals and at bedtime)   traZODone (DESYREL) 50 mg tablet 6/10/2022 at Unknown time Self Yes Yes   Sig: Take 150 mg by mouth daily at bedtime      Facility-Administered Medications: None     No Known Allergies    Lab Results: I have personally reviewed pertinent lab results  , CBC:   Lab Results   Component Value Date    WBC 13 54 (H) 06/15/2022    HGB 6 2 (LL) 06/15/2022    HCT 18 9 (L) 06/15/2022    MCV 90 06/15/2022     (H) 06/15/2022    MCH 30 0 06/15/2022    MCHC 33 4 06/15/2022    RDW 17 6 (H) 06/15/2022    MPV 9 1 06/15/2022    NRBC 0 06/15/2022   , CMP:   Lab Results   Component Value Date    SODIUM 134 (L) 06/15/2022    K 4 3 06/15/2022     06/15/2022    CO2 18 (L) 06/15/2022    BUN 24 06/15/2022    CREATININE 1 10 06/15/2022    CALCIUM 7 0 (L) 06/15/2022    EGFR 48 06/15/2022       Counseling / Coordination of Care  Total floor / unit time spent today 25 minutes  Greater than 50% of total time was spent with the patient and / or family counseling and / or coordination of care      Joycelyn Cooper MD  6/15/2022 10:16 PM

## 2022-06-16 NOTE — PROGRESS NOTES
[facility]  Transfer Acceptance Note - Janna Goss 1945, 64 y o  female MRN: [id number]  Unit/Bed#: ICU 05 Encounter: [id number]  Primary Care Provider: Lamin Gonsalez MD   Date and time admitted to hospital: 6/11/2022  4:40 PM    * Acute blood loss anemia  Assessment & Plan  ASSESSMENT  · Admitted on 6/11 with acute blood loss anemia with an Hgb of 4 4 in ED given 2 units pRBC's  · Recent admission (05/24) for symptomatic anemia and discharged with Hgb of 8 3     · Hx of Gastric ulcers, see below  · Currently not on any AC/AP at home  · Started on IV Protonix and consulted GI  · She did receive an additional 2 units PRBC's on 6/12 for anemia hgb 5 9   · Pt underwent an EGD on 6/12 which showed a single large, deep, irregular, benign-appearing ulcer in the gastrojejunal anastomosis with adherent clot  Placed 2 clips, injected epinephrine, hemostasis achieved  -  · Was called my [facility] provider last night to evaluate her  She had a large maroon/burgandy BM which was followed by hypotension (80's/40's) and tachycardia (110's)  · Pt complaining of lower ABD pain, cramping in nature  · Pt appeared pale, tachypneic and weak  · STAT labs: Hgb 6 2, down from 9 9 @ 1300  · Lactic 3 6  · GI already following, was called to evaluate and she was taken for an emergent EGD  · EGD 6/15 - Single large, deep, irregular ulcer in the gastrojejunal anastomosis with nonbleeding visible vessel  Induced coagulation and hemostasis achieved with 2 applications of bipolar cautery, epinephrine injection; hemostasis achieved    · Surgery consulted Prior to EGD  · Received 1L NSS and 250mL Albumin and 2 units PRBC's  · Given 80mg IV Protonix and started on Protonix gtt @ 8mg/hr  · Total products: 6 units PRBC's    PLAN  · Continue to trend H/H q6h  · Transfuse for Hgb < 7 or active bleeding, s/s hemodynamic instability  · Follow up lactate now  · Isolyte @ 125cc/hr  · Maintain 2 large bore IV's, pt a difficult stick may need a PICC line  · Monitor VS  · Check iCal and replete accordingly d/t frequent transfusions, see below  · Keep NPO  · Zofran PRN for N/V  · Surgery recommend Misoprostol 200mg PO AC/HS, will hold for now while NPO and discuss starting when cleared for PO  · Outpatient EGD in 3 months with GI team  · Follow up on biopsies      Gastric ulcer  Assessment & Plan  · Recent admission for symptomatic anemia  EGD during that admission with noted ulcer, no active bleeding  Patient was to have follow up EGD and to continue PPI and Carafate  · EGD 5/26/2022: Single large, deep, irregular, benign-appearing ulcer in the gastric pouch and gastrojejunal anastomosis; performed cold forceps biopsy  Large ulcer noted at the anastomosis/distal gastric pouch  Staples noted in this area  No active bleeding from the ulceration  The ulceration is large measuring at least 2 5 x 3 cm  · EGD 7/2021: Signs of previous gastric bypass surgery in the stomach  History of [place] en y gastric bypass  Mild erythematous mucosa in the body of the stomach and antrum; performed cold forceps biopsy  Biopsies obtained to rule out H  Pylori  The jejunum appeared normal  · EGD 9/2019: Signs of previous gastric bypass surgery in the stomach  History of [place] en y gastric bypass  Multiple cratered, linear, benign-appearing ulcers in the body of the stomach and anastomosis of the stomach   The jejunum appeared normal  · See above for current plan    Hypocalcemia  Assessment & Plan  · Ionized calcium 0 98  · Likely d/t blood transfusions  · Asymptomatic  · Replete with 2g calcium gluconate  · Repeat iCal with AM labs    Hyponatremia  Assessment & Plan  · Sodium 134 on most recent labs  · Has had issues with hyponatremia this admission  · Continue Isolyte while NPO  · Repeat BMP in AM  · Continue to monitor    Neutrophilic leukocytosis  Assessment & Plan  · Patient noted to have neutrophilic leukocytosis on 54/30 AM labs  · No fevers or clinical signs of infection noted  · Likely reactive  · Repeat CBC in AM        Ambulatory dysfunction  Assessment & Plan  · Likely related to malnutrition and deconditioning  · Continue with PT/OT   · Recommended post acute rehab after discharge      H/O bariatric surgery - bypass  Assessment & Plan  · Patient reports remote history of Savage-en-Y gastric bypass approximately 12 years ago, at [facility]   · She states she has not been taking vitamin supplementation for some time now  Depression  Assessment & Plan  · Hold home Klonopin and Trazodone  · Seen by Neuropsych, deemed patient to not have capacity to make decisions  · Delirium precautions   · CAM-ICU       Hypothyroidism  Assessment & Plan  Lab Results   Component Value Date    [id number] 3 492 05/24/2022     · Hold home Synthroid, resume once cleared for PO by GI    -------------------------------------------------------------------------------------------------------------  Reason for Transfer: Pt had a large maroon/burgandy bowel movement, became hypotensive and tachycardic, Hgb 6 2  History of Present Illness   Chon Montez is a 64 y o  female with a PMHx: Savage-en-Y gastric bypass, symptomatic anemia requiring admission, gastric ulcer with multiple EGD's, hypothyroidism, depression, malnutrition  Initially presented to the ED on 6/11 who presents with hematemesis and blood per rectum  In the ED she was noted to have BRBPR, initial labs Hgb 4 4 down from 8 3 upon discharge of a recent admission on 5/28/2022  She was also noted to have NOAH on admission which has since resolved  In the ED she received 2 units PRBCs, bolused with Protonix and started on an infusion dmitted to the ICU as SD1  She did receive an additional 2 units PRBC's on 6/12 for anemia hgb 5 9   GI was consulted and she underwent an EGD on 6/12 which showed single large, deep, irregular, benign-appearing ulcer in the gastrojejunal anastomosis with adherent clot placed 2 clips, injected epinephrine and achieved homeostasis  She was transferred to [place] [address] on 6/13  Last night I was called by [facility] provider to evaluate her  She had a large maroon/burgandy BM which was followed by hypotension (80's/40's) and tachycardia (110's)  Upon exam she was pale, diaphoretic, tachypneic, complaining of lower abdominal pain cramping in nature  STAT labs were sent and Hgb had dropped to 6 2 down from 9 9 at 1300 earlier in the afternoon  Lactate was elevated at 3 6  GI has been following since admission, they were called and notified  In the interim she was given 1L NSS, 250 Albumin and was receiving 1 unit PRBC  Pt went for an emergent EGD which showed a single large, deep, irregular ulcer in the gastrojejunal anastomosis with nonbleeding visible vessel  Induced coagulation and hemostasis achieved with 2 applications of bipolar cautery, epinephrine injection  During the EGD she was given an additional unit of PRBCs for a total of 2 today  Post EGD she was transferred to the ICU as SD1 for closer monitoring  History obtained from chart review and the patient   -------------------------------------------------------------------------------------------------------------  Dispo: Transfer to Stepdown Level 1   Code Status: Level 1 - Full Code  --------------------------------------------------------------------------------------------------------------  Review of Systems   Constitutional: Positive for chills  Negative for activity change, appetite change and fatigue  HENT: Negative  Eyes: Negative  Respiratory: Negative for cough, chest tightness, shortness of breath and wheezing  Cardiovascular: Negative for chest pain, palpitations and leg swelling  Gastrointestinal: Positive for abdominal pain, blood in stool, diarrhea and nausea  Negative for abdominal distention and vomiting  Endocrine: Negative  Genitourinary: Negative  Musculoskeletal: Negative      Skin: Negative  Allergic/Immunologic: Negative  Neurological: Positive for weakness  Negative for dizziness, syncope, light-headedness, numbness and headaches  Hematological: Negative  Psychiatric/Behavioral: Negative  A 12-point, complete review of systems was reviewed and negative except as stated above     Physical Exam  Vitals and nursing note reviewed  Constitutional:       General: She is awake  She is not in acute distress  Appearance: Normal appearance  She is normal weight  She is ill-appearing  HENT:      Head: Normocephalic and atraumatic  Mouth/Throat:      Mouth: Mucous membranes are dry  Pharynx: Oropharynx is clear  Eyes:      Pupils: Pupils are equal, round, and reactive to light  Cardiovascular:      Rate and Rhythm: Normal rate and regular rhythm  Pulses:           Radial pulses are 2+ on the right side and 2+ on the left side  Dorsalis pedis pulses are 1+ on the right side and 1+ on the left side  Heart sounds: Normal heart sounds  Pulmonary:      Effort: Tachypnea present  Breath sounds: Normal breath sounds  Abdominal:      General: There is no distension  Palpations: Abdomen is soft  Tenderness: There is abdominal tenderness  There is guarding  Musculoskeletal:      Cervical back: Normal range of motion  Right lower leg: No edema  Left lower leg: No edema  Skin:     General: Skin is cool and dry  Capillary Refill: Capillary refill takes 2 to 3 seconds  Coloration: Skin is pale  Neurological:      General: No focal deficit present  Mental Status: She is alert and oriented to person, place, and time  GCS: GCS eye subscore is 4  GCS verbal subscore is 5  GCS motor subscore is 6  Sensory: Sensation is intact  Motor: Motor function is intact  Psychiatric:         Attention and Perception: Attention and perception normal          Mood and Affect: Mood is anxious           Speech: Speech normal          Behavior: Behavior normal  Behavior is cooperative  Cognition and Memory: Cognition and memory normal        --------------------------------------------------------------------------------------------------------------  Vitals:   Vitals:    06/15/22 2245 06/15/22 2300 06/15/22 2315 06/15/22 2334   BP: 116/58 104/56 110/65 119/55   BP Location:    Left arm   Pulse: 83 80 80 84   Resp: 20 18 20 (!) 28   Temp:   (!) 97 3 °F (36 3 °C) (!) 97 °F (36 1 °C)   TempSrc:    Oral   SpO2: 99% 100% 100% 100%   Weight:    56 8 kg (125 lb 3 5 oz)   Height:    5' 4" (1 626 m)     Temp  Min: 97 °F (36 1 °C)  Max: 100 4 °F (38 °C)     Height: 5' 4" (162 6 cm)  Body mass index is 21 49 kg/m²  Laboratory and Diagnostics:  Results from last 7 days   Lab Units 06/15/22  2344 06/15/22  1946 06/15/22  1300 06/15/22  0439 06/14/22  1044 06/13/22  0550 06/12/22  2026 06/12/22  1339 06/12/22  0507 06/12/22  0102 06/11/22  1704   WBC Thousand/uL 12 02*  --   --  13 54* 10 27* 12 71*  --   --  10 24*  --  13 08*   HEMOGLOBIN g/dL 8 4* 6 2* 9 9* 9 9* 10 1* 9 9* 10 2*   < > 5 9*   < > 4 4*   HEMATOCRIT % 24 8* 18 9* 30 2* 29 6* 30 2* 28 8* 29 5*   < > 17 2*   < > 14 9*   PLATELETS Thousands/uL 375  --   --  608* 535* 407*  --   --  460*  --  635*   NEUTROS PCT %  --   --   --  69 66 69  --   --  57  --   --    BANDS PCT %  --   --   --   --   --   --   --   --   --   --  3   MONOS PCT %  --   --   --  9 11 10  --   --  9  --   --    MONO PCT %  --   --   --   --   --   --   --   --   --   --  5    < > = values in this interval not displayed       Results from last 7 days   Lab Units 06/15/22  2123 06/15/22  0439 06/14/22  1044 06/13/22  0550 06/12/22  0507 06/11/22  1704   SODIUM mmol/L 134* 136 132* 136 136 133*   POTASSIUM mmol/L 4 3 3 8 4 0 3 7 4 0 4 4   CHLORIDE mmol/L 108 107 106 109* 106 105   CO2 mmol/L 18* 21 22 21 16* 18*   ANION GAP mmol/L 8 8 4 6 14* 10   BUN mg/dL 24 14 15 20 27* 25   CREATININE mg/dL 1  10 1 04 1 08 1 16 0 96 1 39*   CALCIUM mg/dL 7 0* 7 8* 7 5* 7 6* 6 0* 7 4*   GLUCOSE RANDOM mg/dL 136 92 104 86 69 119   ALT U/L  --   --   --  9 7 8   AST U/L  --   --   --  17 12* 18   ALK PHOS U/L  --   --   --  42 30* 40   ALBUMIN g/dL  --   --   --  2 4* 1 9* 2 3*   TOTAL BILIRUBIN mg/dL  --   --   --  0 75 1 09* 0 39     Results from last 7 days   Lab Units 06/13/22  0550 06/12/22  0507   MAGNESIUM mg/dL 2 1 2 3   PHOSPHORUS mg/dL 2 7 2 7      Results from last 7 days   Lab Units 06/15/22  2123 06/13/22  0550 06/12/22  1752 06/12/22  0507 06/11/22  1704   INR  1 51* 1 05 1 12 1 48* 1 40*   PTT seconds 29  --   --   --   --           Results from last 7 days   Lab Units 06/15/22  2344 06/15/22  2107 06/12/22  0919   LACTIC ACID mmol/L 1 8 3 6* 0 5     ABG:    VBG:                EKG:  Sinus Rhythm, shortened FL interval  Imaging: No new imaging      Historical Information   Past Medical History:   Diagnosis Date    Anemia     Anxiety     Bipolar disorder (HCC)     Colon polyp     Disease of thyroid gland     Essential hypertension     Essential tremor     Fibromyalgia, primary     GERD (gastroesophageal reflux disease)     Inflammatory polyarthropathy (HCC)     Mammogram abnormal      Past Surgical History:   Procedure Laterality Date    BREAST BIOPSY Right 2015    benign    CARPAL TUNNEL RELEASE Bilateral     COLONOSCOPY      EGD AND COLONOSCOPY  01/01/2014    GASTRIC BYPASS  07/01/2012    MAMMO NEEDLE LOCALIZATION RIGHT (ALL INC) Right 2/6/2012    MAMMO NEEDLE LOCALIZATION RIGHT (ALL INC) Right 2/6/2012    ULNAR NERVE TRANSPOSITION Right      Social History   Social History     Substance and Sexual Activity   Alcohol Use Yes    Alcohol/week: 4 0 standard drinks    Types: 4 Glasses of wine per week    Comment: rare     Social History     Substance and Sexual Activity   Drug Use Never     Social History     Tobacco Use   Smoking Status Former Smoker    Quit date: 12    Years since quittin 4   Smokeless Tobacco Never Used       Family History:   Family History   Problem Relation Age of Onset    No Known Problems Mother     No Known Problems Father     No Known Problems Sister     No Known Problems Maternal Grandmother     No Known Problems Maternal Grandfather     No Known Problems Paternal Grandmother     No Known Problems Paternal Grandfather     No Known Problems Sister     No Known Problems Sister     Stomach cancer Maternal Aunt     No Known Problems Maternal Aunt     No Known Problems Maternal Aunt     No Known Problems Maternal Aunt     No Known Problems Paternal Aunt     Leukemia Other      Family history unknown      Medications:  Current Facility-Administered Medications   Medication Dose Route Frequency    acetaminophen (TYLENOL) tablet 650 mg  650 mg Oral Q6H PRN    calcium gluconate 2 g in sodium chloride 0 9% 100 mL (premix)  2 g Intravenous Once    multi-electrolyte (PLASMALYTE-A/ISOLYTE-S PH 7 4) IV solution  125 mL/hr Intravenous Continuous    ondansetron (ZOFRAN) injection 4 mg  4 mg Intravenous Q6H PRN    pantoprazole (PROTONIX) 80 mg in sodium chloride 0 9 % 100 mL infusion  8 mg/hr Intravenous Continuous     Home medications:  Prior to Admission Medications   Prescriptions Last Dose Informant Patient Reported? Taking?    clonazePAM (KlonoPIN) 1 mg tablet 6/10/2022 at Unknown time Self Yes Yes   Sig: TK 3 TS PO Q NIGHT   levothyroxine 112 mcg tablet 2022 at Unknown time Self Yes Yes   Sig: TK 1 T PO QD   pantoprazole (PROTONIX) 40 mg tablet 2022 at Unknown time  No Yes   Sig: Take 1 tablet (40 mg total) by mouth 2 (two) times a day before meals   sucralfate (CARAFATE) 1 g tablet 2022 at Unknown time  No Yes   Sig: Take 1 tablet (1 g total) by mouth 4 (four) times a day (before meals and at bedtime)   traZODone (DESYREL) 50 mg tablet 6/10/2022 at Unknown time Self Yes Yes   Sig: Take 150 mg by mouth daily at bedtime Facility-Administered Medications: None     Allergies:  No Known Allergies  ------------------------------------------------------------------------------------------------------------  Advance Directive and Living Will: No  Power of : Iam Marvin (Sister)  POLST: No  ------------------------------------------------------------------------------------------------------------  Care Time Delivered:   Upon my evaluation, this patient had a high probability of imminent or life-threatening deterioration due to Hemorrhage d/t UGIB, which required my direct attention, intervention, and personal management  I have personally provided 60 minutes (7846 to 0018) of critical care time, exclusive of procedures, teaching, family meetings, and any prior time recorded by providers other than myself  TOLU Hogan        Portions of the record may have been created with voice recognition software  Occasional wrong word or "sound a like" substitutions may have occurred due to the inherent limitations of voice recognition software    Read the chart carefully and recognize, using context, where substitutions have occurred

## 2022-06-16 NOTE — ANESTHESIA POSTPROCEDURE EVALUATION
Post-Op Assessment Note    CV Status:  Stable  Pain Score: 0    Pain management: adequate     Mental Status:  Alert and awake   Hydration Status:  Euvolemic   PONV Controlled:  Controlled   Airway Patency:  Patent      Post Op Vitals Reviewed: Yes      Staff: CRNA         No complications documented      BP  125/58   Temp   97 6   Pulse  90   Resp   17   SpO2   100%

## 2022-06-16 NOTE — ASSESSMENT & PLAN NOTE
· Patient noted to have neutrophilic leukocytosis on 38/15 AM labs  · No fevers or clinical signs of infection noted  · Likely reactive  · Repeat CBC in AM

## 2022-06-16 NOTE — ASSESSMENT & PLAN NOTE
· Recent admission for symptomatic anemia  EGD during that admission with noted ulcer, no active bleeding  Patient was to have follow up EGD and to continue PPI and Carafate  · EGD 5/26/2022: Single large, deep, irregular, benign-appearing ulcer in the gastric pouch and gastrojejunal anastomosis; performed cold forceps biopsy  Large ulcer noted at the anastomosis/distal gastric pouch  Staples noted in this area  No active bleeding from the ulceration  The ulceration is large measuring at least 2 5 x 3 cm  · EGD 7/2021: Signs of previous gastric bypass surgery in the stomach  History of [place] en y gastric bypass  Mild erythematous mucosa in the body of the stomach and antrum; performed cold forceps biopsy  Biopsies obtained to rule out H  Pylori  The jejunum appeared normal  · EGD 9/2019: Signs of previous gastric bypass surgery in the stomach  History of [place] en y gastric bypass  Multiple cratered, linear, benign-appearing ulcers in the body of the stomach and anastomosis of the stomach  The jejunum appeared normal  · Acute GI blood loss anemia, peptic ulcer disease, gastric ulcer, hematemesis, bariatric surgery status-   s/p EGD x3 with anastomotic ulcer s/p hemoclip, bicap and epi  Transfer to bariatric service at [facility] planned  Monitor hemoglobin, transfuse as needed  Appreciate surgery input  Would recommend IR vs surgical intervention if she re-bleeds  Continue PPI drip and carafate

## 2022-06-16 NOTE — CASE MANAGEMENT
Case Management Discharge Planning Note    Patient name Federica Mauricio  Location ICU 05/ICU 05 MRN [id number]  : 1945 Date 2022       Current Admission Date: 2022  Current Admission Diagnosis:Acute blood loss anemia   Patient Active Problem List    Diagnosis Date Noted    Hypocalcemia 2022    Severe protein-calorie malnutrition ([address] )     Neutrophilic leukocytosis     Ambulatory dysfunction 2022    Acute blood loss anemia 2022    Gastric ulcer 2022    Fever 2022    Anemia 2022    Dyspnea on exertion 2022    Generalized weakness 2022    Hyponatremia 2022    Abnormal CT scan 2022    H/O bariatric surgery - bypass 2022    Cerumen debris on tympanic membrane of right ear 2022    Nasal congestion 2022    Bilateral hearing loss 2022    Fibromyalgia syndrome 2021    Osteopenia of both forearms 2021    Medicare annual wellness visit, subsequent 2021    Shortness of breath 2021    Acute bronchitis 2021    COVID-19 2021    Dysphasia 2020    Epigastric pain 2020    Hypothyroidism 2020    Gastroesophageal reflux disease without esophagitis 2020    Depression 2020    Fibromyalgia, primary 2020    Iron deficiency 2020    Numbness of foot 2020      LOS (days): 5  Geometric Mean LOS (GMLOS) (days): 4 90  Days to GMLOS:0 3     OBJECTIVE:  Risk of Unplanned Readmission Score: 17 13         Current admission status: Inpatient   Preferred Pharmacy:   [facility] [address] [facility], [address] [place] [id number]  Phone: [phone] Fax: [phone]    Primary Care Provider: Deanna Langford MD    Primary Insurance: [facility]  Secondary Insurance:     DISCHARGE DETAILS: Facility Insurance Auth Number: Approved [facility] auth  # [id number] (PIWJ ref # [id number]) x5 days SOC: 6/16 NRD: 6/20 Care cood: Niecy POZO   Fax review to [phone]

## 2022-06-16 NOTE — ASSESSMENT & PLAN NOTE
· Ionized calcium 0 98 in AM   · Likely d/t blood transfusions  · Asymptomatic  · Repleted with 2g calcium gluconate  · Repeat iCal with AM labs

## 2022-06-16 NOTE — CASE MANAGEMENT
Case Management Progress Note    Patient name Mansoor Guerrero  Location ICU 05/ICU 05 MRN [id number]  : 1945 Date 2022       LOS (days): 5  Geometric Mean LOS (GMLOS) (days): 4 90  Days to GMLOS:0        OBJECTIVE:        Current admission status: Inpatient  Preferred Pharmacy:   [facility] [address] [place] [id number]  Phone: [phone] Fax: [phone]    Primary Care Provider: Akiko Lopez MD    Primary Insurance: [facility]  Secondary Insurance:     PROGRESS NOTE:      CM department notified by provider that patient is requiring transfer to [facility]  CM spoke with primary nurse regarding patient's requirements for transport  CMN was completed for Transport once a bed is available and PACS secures a transport time  Nursing and provider team will keep patient updated on transfer and transportation updates

## 2022-06-16 NOTE — ASSESSMENT & PLAN NOTE
Malnutrition Findings:   Adult Malnutrition type: Chronic illness  Adult Degree of Malnutrition: Other severe protein calorie malnutrition  Malnutrition Characteristics: Muscle loss, Weight loss, Fat loss                  360 Statement: Severe malnutrition r/t inadequate intake as evidenced by 13% weight loss over 4 months, orbitals hollow, muscle wasting present to temples; nutritional supplements ordered once diet advances    BMI Findings: Body mass index is 21 49 kg/m²

## 2022-06-16 NOTE — ASSESSMENT & PLAN NOTE
· Patient reports remote history of Savage-en-Y gastric bypass approximately 12 years ago, at [facility]   · She states she has not been taking vitamin supplementation for some time now

## 2022-06-16 NOTE — ASSESSMENT & PLAN NOTE
Lab Results   Component Value Date    [id number] 3 492 05/24/2022     · Hold home Synthroid, resume once cleared for PO by GI

## 2022-06-16 NOTE — ASSESSMENT & PLAN NOTE
· Patient reports remote history of Savage-en-Y gastric bypass approximately 12 years ago, at [facility]   · She states she has not been taking vitamin supplementation for some time now

## 2022-06-16 NOTE — ASSESSMENT & PLAN NOTE
· Patient noted to have neutrophilic leukocytosis on 13/87 AM labs  · No fevers or clinical signs of infection noted  · Likely reactive  · Repeat CBC in AM

## 2022-06-16 NOTE — PROGRESS NOTES
Daily Progress Note - Critical Care   Emily Edmonds 64 y o  female MRN: [id number]  Unit/Bed#: ICU 05 Encounter: [id number]        ----------------------------------------------------------------------------------------  HPI:   Emily Edmonds is a 64 y o  female with a PMHx: Savage-en-Y gastric bypass, symptomatic anemia requiring admission, gastric ulcer with multiple EGD's, hypothyroidism, depression, malnutrition  Initially presented to the ED on 6/11 who presents with hematemesis and blood per rectum  In the ED she was noted to have BRBPR, initial labs Hgb 4 4 down from 8 3 upon discharge of a recent admission on 5/28/2022  She was also noted to have NOAH on admission which has since resolved  In the ED she received 2 units PRBCs, bolused with Protonix and started on an infusion dmitted to the ICU as SD1  She did receive an additional 2 units PRBC's on 6/12 for anemia hgb 5 9  GI was consulted and she underwent an EGD on 6/12 which showed single large, deep, irregular, benign-appearing ulcer in the gastrojejunal anastomosis with adherent clot placed 2 clips, injected epinephrine and achieved homeostasis  She was transferred to [place] [address] on 6/13  Last night I was called by [facility] provider to evaluate her  She had a large maroon/burgandy BM which was followed by hypotension (80's/40's) and tachycardia (110's)  Upon exam she was pale, diaphoretic, tachypneic, complaining of lower abdominal pain cramping in nature  STAT labs were sent and Hgb had dropped to 6 2 down from 9 9 at 1300 earlier in the afternoon  Lactate was elevated at 3 6  GI has been following since admission, they were called and notified  In the interim she was given 1L NSS, 250 Albumin and was receiving 1 unit PRBC  Pt went for an emergent EGD which showed a single large, deep, irregular ulcer in the gastrojejunal anastomosis with nonbleeding visible vessel   Induced coagulation and hemostasis achieved with 2 applications of bipolar cautery, epinephrine injection  During the EGD she was given an additional unit of PRBCs for a total of 2 today  Post EGD she was transferred to the ICU as SD1 for closer monitoring         ----------------------------------------------------------------------------------------  24hr events: ***      ---------------------------------------------------------------------------------------  SUBJECTIVE  Pt this AM reports being cold and asked when she can eat again  Reports wanting to make sure she takes her thyroid medication, otherwise denies any other concerns this AM     Review of Systems   Constitutional: Positive for chills  Negative for activity change, appetite change, diaphoresis, fatigue, fever and unexpected weight change  HENT: Negative  Eyes: Negative  Respiratory: Negative  Cardiovascular: Negative  Gastrointestinal: Negative  Endocrine: Negative  Genitourinary: Negative  Musculoskeletal: Negative  Skin: Negative  Allergic/Immunologic: Negative  Neurological: Negative  Hematological: Negative  Psychiatric/Behavioral: Negative        Review of systems was reviewed and negative unless stated above in HPI/24-hour events   ---------------------------------------------------------------------------------------  Problem List/Active problems:   Principal Problem:    Acute blood loss anemia  Active Problems:    H/O bariatric surgery - bypass    Gastric ulcer    Hypothyroidism    Depression    Hyponatremia    Ambulatory dysfunction    Neutrophilic leukocytosis    Hypocalcemia  Resolved Problems:    BRBPR (bright red blood per rectum)    Hematemesis    Severe protein-calorie malnutrition (HCC)    NOAH (acute kidney injury) ([address] )          Assessment and Plan      Patient appropriate for transfer out of the ICU today?: {Patient on Medical Critical Care Service: 27937}  Disposition: Continue Critical Care   Code Status: Level 1 - Full Code  ---------------------------------------------------------------------------------------  ICU CORE MEASURES    Prophylaxis   VTE Pharmacologic Prophylaxis: {VTE Prophylaxis Meds:43469}  VTE Mechanical Prophylaxis: {Mechanical VTE Prophylaxis:46009}  Stress Ulcer Prophylaxis: {Stress Ulcer Prophylaxis:07320}    ABCDE Protocol (if indicated)  Plan to perform spontaneous awakening trial today? {YES NO NA ORDERS [facility]:43291:s}  Plan to perform spontaneous breathing trial today? {YES NO NA ORDERS [facility]A:73842:s}  Obvious barriers to extubation? {YES NO NA ORDERS [facility]A:78140:s}  CAM-ICU: {POSITIVE/NEG/WILDCARD:69696}    Invasive Devices Review  Invasive Devices  Report    Peripheral Intravenous Line  Duration           Peripheral IV 06/15/22 Distal;Left;Ventral (anterior) Forearm 1 day    Peripheral IV 22 Left;Proximal;Ventral (anterior) Forearm <1 day              Can any invasive devices be discontinued today? {YES NO NA ORDERS [facility]:17604:s}  ---------------------------------------------------------------------------------------  OBJECTIVE    Vitals   Vitals:    22 0400 22 0500 22 0600 22 0700   BP: 128/60 113/54 119/69 129/60   BP Location:    Left arm   Pulse: 75 69 85 78   Resp:  17 20 20   Temp:    97 8 °F (36 6 °C)   TempSrc:    Oral   SpO2: 98% 95% 99% 99%   Weight:   56 8 kg (125 lb 3 5 oz)    Height:         Temp (24hrs), Av 6 °F (36 4 °C), Min:97 °F (36 1 °C), Max:98 8 °F (37 1 °C)  Current: Temperature: 97 8 °F (36 6 °C)  HR: ***  BP: ***/***  RR: ***  SpO2: ***    Respiratory:  { IP ICU Oxygen Therapy:00052}  Nasal Cannula O2 Flow Rate (L/min): 2 L/min    Invasive/non-invasive ventilation settings   Respiratory  Report   Lab Data (Last 4 hours)    None         O2/Vent Data (Last 4 hours)    None                Physical Exam  Vitals and nursing note reviewed  Constitutional:       General: She is awake  She is not in acute distress  Appearance: Normal appearance   She is normal weight  She is ill-appearing  HENT:      Head: Normocephalic and atraumatic  Nose: Nose normal       Mouth/Throat:      Mouth: Mucous membranes are dry  Pharynx: Oropharynx is clear  Eyes:      Pupils: Pupils are equal, round, and reactive to light  Cardiovascular:      Rate and Rhythm: Normal rate and regular rhythm  Pulses:           Radial pulses are 2+ on the right side and 2+ on the left side  Dorsalis pedis pulses are 1+ on the right side and 1+ on the left side  Heart sounds: Normal heart sounds  Pulmonary:      Effort: Tachypnea present  Breath sounds: Normal breath sounds  Abdominal:      General: There is no distension  Palpations: Abdomen is soft  Tenderness: There is no abdominal tenderness  There is no guarding  Musculoskeletal:      Cervical back: Normal range of motion  Right lower leg: No edema  Left lower leg: No edema  Skin:     General: Skin is cool and dry  Capillary Refill: Capillary refill takes 2 to 3 seconds  Coloration: Skin is pale  Neurological:      General: No focal deficit present  Mental Status: She is alert and oriented to person, place, and time  GCS: GCS eye subscore is 4  GCS verbal subscore is 5  GCS motor subscore is 6  Sensory: Sensation is intact  Motor: Motor function is intact  Psychiatric:         Attention and Perception: Attention and perception normal          Mood and Affect: Mood is anxious  Speech: Speech normal          Behavior: Behavior normal  Behavior is cooperative           Cognition and Memory: Cognition and memory normal              Laboratory and Diagnostics:  Results from last 7 days   Lab Units 06/16/22  0437 06/15/22  2344 06/15/22  1946 06/15/22  1300 06/15/22  0439 06/14/22  1044 06/13/22  0550 06/12/22  1339 06/12/22  0507 06/12/22  0102 06/11/22  1704   WBC Thousand/uL 11 46* 12 02*  --   --  13 54* 10 27* 12 71*  --  10 24*  --  13 08* HEMOGLOBIN g/dL 9 0* 8 4* 6 2* 9 9* 9 9* 10 1* 9 9*   < > 5 9*   < > 4 4*   HEMATOCRIT % 25 5* 24 8* 18 9* 30 2* 29 6* 30 2* 28 8*   < > 17 2*   < > 14 9*   PLATELETS Thousands/uL 341 375  --   --  608* 535* 407*  --  460*  --  635*   NEUTROS PCT % 78*  --   --   --  69 66 69  --  57  --   --    BANDS PCT %  --   --   --   --   --   --   --   --   --   --  3   MONOS PCT % 3*  --   --   --  9 11 10  --  9  --   --    MONO PCT %  --   --   --   --   --   --   --   --   --   --  5    < > = values in this interval not displayed       Results from last 7 days   Lab Units 06/16/22  0437 06/15/22  2123 06/15/22  0439 06/14/22  1044 06/13/22  0550 06/12/22  0507 06/11/22  1704   SODIUM mmol/L 134* 134* 136 132* 136 136 133*   POTASSIUM mmol/L 4 2 4 3 3 8 4 0 3 7 4 0 4 4   CHLORIDE mmol/L 108 108 107 106 109* 106 105   CO2 mmol/L 17* 18* 21 22 21 16* 18*   ANION GAP mmol/L 9 8 8 4 6 14* 10   BUN mg/dL 23 24 14 15 20 27* 25   CREATININE mg/dL 1 00 1 10 1 04 1 08 1 16 0 96 1 39*   CALCIUM mg/dL 7 7* 7 0* 7 8* 7 5* 7 6* 6 0* 7 4*   GLUCOSE RANDOM mg/dL 143* 136 92 104 86 69 119   ALT U/L  --   --   --   --  9 7 8   AST U/L  --   --   --   --  17 12* 18   ALK PHOS U/L  --   --   --   --  42 30* 40   ALBUMIN g/dL  --   --   --   --  2 4* 1 9* 2 3*   TOTAL BILIRUBIN mg/dL  --   --   --   --  0 75 1 09* 0 39     Results from last 7 days   Lab Units 06/16/22  0437 06/13/22  0550 06/12/22  0507   MAGNESIUM mg/dL 1 8* 2 1 2 3   PHOSPHORUS mg/dL 4 0 2 7 2 7      Results from last 7 days   Lab Units 06/16/22  0437 06/15/22  2123 06/13/22  0550 06/12/22  1752 06/12/22  0507 06/11/22  1704   INR  1 36* 1 51* 1 05 1 12 1 48* 1 40*   PTT seconds  --  29  --   --   --   --           Results from last 7 days   Lab Units 06/15/22  2344 06/15/22  2107 06/12/22  0919   LACTIC ACID mmol/L 1 8 3 6* 0 5     ABG:    VBG:          Micro        EKG: ***  Imaging: *** {Results Review Statement:82984}    Intake and Output  I/O       06/14 0701  06/15 0700 06/15 0701  06/16 0700 06/16 0701  06/17 0700    P  O  1010 720     I V  (mL/kg)  1078 8 (19)     Blood  700     IV Piggyback  350     Total Intake(mL/kg) 1010 (20 1) 2848 8 (50 2)     Net +1010 +2848  8            Unmeasured Urine Occurrence  2 x     Unmeasured Stool Occurrence  2 x         UOP: *** ml/hr     Height and Weights   Height: 5' 4" (162 6 cm)     Body mass index is 21 49 kg/m²  Weight (last 2 days)     Date/Time Weight    06/16/22 0600 56 8 (125 22)    06/16/22 0238 56 8 (125 22)    06/15/22 2334 56 8 (125 22)    06/15/22 0600 50 2 (110 67)            Nutrition       Diet Orders   (From admission, onward)             Start     Ordered    06/15/22 2359  Diet NPO  Diet effective now        References:    Nutrtion Support Algorithm Enteral vs  Parenteral   Question Answer Comment   Diet Type NPO    RD to adjust diet per protocol? Yes        06/15/22 2358    06/13/22 0841  Dietary nutrition supplements  Once        Question Answer Comment   Select Supplement: Ensure Enlive-Vanilla    Frequency Breakfast, Lunch, Dinner        06/13/22 0841              TF currently running at *** ml/hr with a goal of *** ml/hr   Formula: ***      Active Medications  Scheduled Meds:  Current Facility-Administered Medications   Medication Dose Route Frequency Provider Last Rate    acetaminophen  650 mg Oral Q6H PRN TOLU Roach      multi-electrolyte  125 mL/hr Intravenous Continuous TOLU Vines 125 mL/hr (06/16/22 0039)    ondansetron  4 mg Intravenous Q6H PRN TOLU Vines      pantoprozole (PROTONIX) infusion (Continuous)  8 mg/hr Intravenous Continuous TOLU Vines 8 mg/hr (06/16/22 0051)     Continuous Infusions:  multi-electrolyte, 125 mL/hr, Last Rate: 125 mL/hr (06/16/22 0039)  pantoprozole (PROTONIX) infusion (Continuous), 8 mg/hr, Last Rate: 8 mg/hr (06/16/22 0051)      PRN Meds:   acetaminophen, 650 mg, Q6H PRN  ondansetron, 4 mg, Q6H PRN        Allergies   No Known Allergies  ---------------------------------------------------------------------------------------  Advance Directive and Living Will:      Power of :    POLST:    ---------------------------------------------------------------------------------------  Care Time Delivered:   {Critical Care Time Delivered:73325}    Bertha Broderick, DO      Portions of the record may have been created with voice recognition software  Occasional wrong word or "sound a like" substitutions may have occurred due to the inherent limitations of voice recognition software    Read the chart carefully and recognize, using context, where substitutions have occurred

## 2022-06-16 NOTE — PROGRESS NOTES
[facility]  Progress Note - Alexandre Hartman 1945, 64 y o  female MRN: [id number]  Unit/Bed#: ICU 05 Encounter: [id number]  Primary Care Provider: Florinda Sherman MD   Date and time admitted to hospital: 6/11/2022  4:40 PM    ----------------------------------------------------------------------------------------  HPI:   Massiel Loomis a 64 y  o  female with a PMHx: Savage-en-Y gastric bypass, symptomatic anemia requiring admission, gastric ulcer with multiple EGD's, hypothyroidism, depression, malnutrition  Initially presented to the ED on 6/11 who presents with hematemesis and blood per rectum  In the ED she was noted to have BRBPR, initial labs Hgb 4 4 down from 8 3 upon discharge of a recent admission on 5/28/2022  She was also noted to have NOAH on admission which has since resolved  In the ED she received 2 units PRBCs, bolused with Protonix and started on an infusion dmitted to the ICU as SD1  She did receive an additional 2 units PRBC's on 6/12 for anemia hgb 5 9  GI was consulted and she underwent an EGD on 6/12 which showed single large, deep, irregular, benign-appearing ulcer in the gastrojejunal anastomosis with adherent clot placed 2 clips, injected epinephrine and achieved homeostasis  She was transferred to [place] [address] on 6/13                 Last night transferred to ICU/Step-down unit  Pt had a large maroon/burgandy BM which was followed by hypotension (80's/40's) and tachycardia (110's)  Upon exam found to be pale, diaphoretic, tachypneic, complaining of lower abdominal pain cramping in nature  STAT labs were sent and Hgb had dropped to 6 2 down from 9 9 at 1300 earlier in the afternoon  Lactate was elevated at 3 6  GI has been following since admission, they were called and notified  In the interim she was given 1L NSS, 250 Albumin and was receiving 1 unit PRBC   Pt went for an emergent EGD which showed a single large, deep, irregular ulcer in the gastrojejunal anastomosis with nonbleeding visible vessel  Induced coagulation and hemostasis achieved with 2 applications of bipolar cautery, epinephrine injection  During the EGD she was given an additional unit of PRBCs for a total of 2 today  Post EGD she was transferred to the ICU as SD1 for closer monitoring   ---------------------------------------------------------------------------------------------------------------------  24hr events:  -  Transferred to ICU/SD overnight     -  Gen Surg discussed bariatric physician might be needed, removal of area of concern (anastomotic site)  -  Pt given 2 units of blood since yesterday     ---------------------------------------------------------------------------------------------------------------------  SUBJECTIVE  Pt this AM reports being cold and asked when she can eat again  Reports wanting to make sure she takes her thyroid medication, otherwise denies any other concerns this AM     Review of Systems  Review of systems was reviewed and negative unless stated above in HPI/24-hour events   ---------------------------------------------------------------------------------------------------------------------  Problem List/Active problems:   Principal Problem:    Acute blood loss anemia  Active Problems:    H/O bariatric surgery - bypass    Gastric ulcer    Hypothyroidism    Depression    Hyponatremia    Ambulatory dysfunction    Neutrophilic leukocytosis    Hypocalcemia  Resolved Problems:    BRBPR (bright red blood per rectum)    Hematemesis    Severe protein-calorie malnutrition (HCC)    NOAH (acute kidney injury) ([facility] )    * Acute blood loss anemia  Assessment & Plan  ASSESSMENT  · Admitted on 6/11 with acute blood loss anemia with an Hgb of 4 4 in ED given 2 units pRBC's    · Recent admission (05/24) for symptomatic anemia and discharged with Hgb of 8 3     · Hx of Gastric ulcers, see below  · Currently not on any AC/AP at home  · Started on IV Protonix and consulted GI  · She did receive an additional 2 units PRBC's on 6/12 for anemia hgb 5 9   · Pt underwent an EGD on 6/12 which showed a single large, deep, irregular, benign-appearing ulcer in the gastrojejunal anastomosis with adherent clot  Placed 2 clips, injected epinephrine, hemostasis achieved  -  · ICU AP called by [facility] last night to evaluate pt  · Had a large maroon/burgandy BM which was followed by hypotension (80's/40's) and tachycardia (110's)  · Pt complaining of lower ABD pain, cramping in nature  · Pt appeared pale, tachypneic and weak  · STAT labs: Hgb 6 2, down from 9 9 @ 1300  · Lactic 3 6  · GI already following, was called to evaluate and she was taken for an emergent EGD  · (06/15):  EGD Single large, deep, irregular ulcer in the gastrojejunal anastomosis with nonbleeding visible vessel  Induced coagulation and hemostasis achieved with 2 applications of bipolar cautery, epinephrine injection; hemostasis achieved  · Surgery consulted Prior to EGD  · Received 1L NSS and 250mL Albumin and 2 units PRBC's  · Given 80mg IV Protonix and started on Protonix gtt @ 8mg/hr  · Total products: 6 units PRBC's  · (06/16): PICC consent  · Surgery recommend Misoprostol 200mg PO AC/HS, will hold for now while  PLAN  · Continue to trend H/H q6h  · Transfuse for Hgb < 7 or active bleeding, s/s hemodynamic instability  · Follow up lactate   · Continue Isolyte @ 125cc/hr  · Maintain 2 large bore IV's, pt a difficult stick may need a PICC line  · Monitor VS  · Consider Gen surg recommendations for bariatric surgery /elective surgery  · Continue NPO  · Zofran PRN for N/V  · Outpatient EGD in 3 months with GI team  · Follow up on biopsies      H/O bariatric surgery - bypass  Assessment & Plan  · Patient reports remote history of Savage-en-Y gastric bypass approximately 12 years ago, at [facility]   · She states she has not been taking vitamin supplementation for some time now      Gastric ulcer  Assessment & Plan  · Recent admission for symptomatic anemia  EGD during that admission with noted ulcer, no active bleeding  Patient was to have follow up EGD and to continue PPI and Carafate  · EGD 5/26/2022: Single large, deep, irregular, benign-appearing ulcer in the gastric pouch and gastrojejunal anastomosis; performed cold forceps biopsy  Large ulcer noted at the anastomosis/distal gastric pouch  Staples noted in this area  No active bleeding from the ulceration  The ulceration is large measuring at least 2 5 x 3 cm  · EGD 7/2021: Signs of previous gastric bypass surgery in the stomach  History of [place] en y gastric bypass  Mild erythematous mucosa in the body of the stomach and antrum; performed cold forceps biopsy  Biopsies obtained to rule out H  Pylori  The jejunum appeared normal  · EGD 9/2019: Signs of previous gastric bypass surgery in the stomach  History of [place] en y gastric bypass  Multiple cratered, linear, benign-appearing ulcers in the body of the stomach and anastomosis of the stomach  The jejunum appeared normal  · See above for current plan    Hypothyroidism  Assessment & Plan  Lab Results   Component Value Date    [id number] 3 492 05/24/2022     · Hold home Synthroid, resume once cleared for PO by GI    Severe protein-calorie malnutrition ([facility] )  Assessment & Plan  Malnutrition Findings:   Adult Malnutrition type: Chronic illness  Adult Degree of Malnutrition: Other severe protein calorie malnutrition  Malnutrition Characteristics: Muscle loss, Weight loss, Fat loss                  360 Statement: Severe malnutrition r/t inadequate intake as evidenced by 13% weight loss over 4 months, orbitals hollow, muscle wasting present to temples; nutritional supplements ordered once diet advances    BMI Findings: Body mass index is 21 49 kg/m²         Hypocalcemia  Assessment & Plan  · Ionized calcium 0 98  · Likely d/t blood transfusions  · Asymptomatic  · Replete with 2g calcium gluconate  · Repeat iCal with AM labs    Neutrophilic leukocytosis  Assessment & Plan  · Patient noted to have neutrophilic leukocytosis on 79/26 AM labs  · No fevers or clinical signs of infection noted  · Likely reactive  · Repeat CBC in AM        Ambulatory dysfunction  Assessment & Plan  · Likely related to malnutrition and deconditioning  · Continue with PT/OT   · Recommended post acute rehab after discharge      Hyponatremia  Assessment & Plan  · Sodium 134 on most recent labs  · Has had issues with hyponatremia this admission  · Continue Isolyte while NPO  · Repeat BMP in AM  · Continue to monitor    Depression  Assessment & Plan  · Hold home Klonopin and Trazodone  · Seen by Neuropsych, deemed patient to not have capacity to make decisions  · Delirium precautions   · CAM-ICU       ---------------------------------------------------------------------------------------------------------------------------------------------------------------------------------------------------------------------------------------------------------------------  Patient appropriate for transfer out of the ICU today?: Patient does not meet criteria for ICU Follow-up Clinic; referral has not been made  Disposition: Transfer to Med Surg with Telemetry   Code Status: Level 1 - Full Code  ---------------------------------------------------------------------------------------------------------------------------------------------------------------------------------------------------------------------------------------------------------------------  ICU CORE MEASURES    Prophylaxis    VTE Pharmacologic Prophylaxis: Patient has refused VTE prophylaxis  VTE Mechanical Prophylaxis: sequential compression device  Stress Ulcer Prophylaxis: Famotidine IV     ABCDE Protocol (if indicated)  Plan to perform spontaneous awakening trial today? N/A  Plan to perform spontaneous breathing trial today? Not applicable  Obvious barriers to extubation? Not applicable  CAM-ICU: Negative    Invasive Devices Review  Invasive Devices  Report    Peripheral Intravenous Line  Duration           Peripheral IV 06/15/22 Distal;Left;Ventral (anterior) Forearm 1 day    Peripheral IV 22 Left;Proximal;Ventral (anterior) Forearm <1 day              Can any invasive devices be discontinued today? N/A  --------------------------------------------------------------------------------------------------------------------  OBJECTIVE    Vitals  Vitals:    22 0400 22 0500 22 0600 22 0700   BP: 128/60 113/54 119/69 129/60   BP Location:    Left arm   Pulse: 75 69 85 78   Resp: 21 17 20 20   Temp:    97 8 °F (36 6 °C)   TempSrc:    Oral   SpO2: 98% 95% 99% 99%   Weight:   56 8 kg (125 lb 3 5 oz)    Height:         Vitals Range from  00:15-07:00:  Temp (24hrs), Av 6 °F (36 4 °C), Min:97 °F (36 1 °C), Max:98 8 °F (37 1 °C)  Current: Temperature: 97 8 °F (36 6 °C)  HR  75 - 85  BP: 113-138/54-89  RR: 17-30  SpO2: 95-99%    Respiratory:  SpO2: SpO2: 99 %  Nasal Cannula O2 Flow Rate (L/min): 2 L/min    Invasive/non-invasive ventilation settings   Respiratory  Report   Lab Data (Last 4 hours)    None         O2/Vent Data (Last 4 hours)    None                Physical Exam  Vitals and nursing note reviewed  Constitutional:       General: She is awake  She is not in acute distress  Appearance: Normal appearance  She is normal weight  She is ill-appearing  HENT:      Head: Normocephalic and atraumatic  Mouth/Throat:      Mouth: Mucous membranes are dry  Pharynx: Oropharynx is clear  Eyes:      Pupils: Pupils are equal, round, and reactive to light  Cardiovascular:      Rate and Rhythm: Normal rate and regular rhythm  Pulses:           Radial pulses are 2+ on the right side and 2+ on the left side  Dorsalis pedis pulses are 1+ on the right side and 1+ on the left side  Heart sounds: Normal heart sounds     Pulmonary: Effort: Tachypnea present  Breath sounds: Normal breath sounds  Abdominal:      General: There is no distension  Palpations: Abdomen is soft  Tenderness: There is no abdominal tenderness  There is no guarding  Musculoskeletal:      Cervical back: Normal range of motion  Right lower leg: No edema  Left lower leg: No edema  Skin:     General: Skin is cool and dry  Capillary Refill: Capillary refill takes 2 to 3 seconds  Coloration: Skin is pale  Neurological:      General: No focal deficit present  Mental Status: She is alert and oriented to person, place, and time  GCS: GCS eye subscore is 4  GCS verbal subscore is 5  GCS motor subscore is 6  Sensory: Sensation is intact  Motor: Motor function is intact  Psychiatric:         Attention and Perception: Attention and perception normal          Mood and Affect: Mood is anxious  Speech: Speech normal          Behavior: Behavior normal  Behavior is cooperative  Cognition and Memory: Cognition and memory normal              Laboratory and Diagnostics:  Results from last 7 days   Lab Units 06/16/22  0437 06/15/22  2344 06/15/22  1946 06/15/22  1300 06/15/22  0439 06/14/22  1044 06/13/22  0550 06/12/22  1339 06/12/22  0507 06/12/22  0102 06/11/22  1704   WBC Thousand/uL 11 46* 12 02*  --   --  13 54* 10 27* 12 71*  --  10 24*  --  13 08*   HEMOGLOBIN g/dL 9 0* 8 4* 6 2* 9 9* 9 9* 10 1* 9 9*   < > 5 9*   < > 4 4*   HEMATOCRIT % 25 5* 24 8* 18 9* 30 2* 29 6* 30 2* 28 8*   < > 17 2*   < > 14 9*   PLATELETS Thousands/uL 341 375  --   --  608* 535* 407*  --  460*  --  635*   NEUTROS PCT % 78*  --   --   --  69 66 69  --  57  --   --    BANDS PCT %  --   --   --   --   --   --   --   --   --   --  3   MONOS PCT % 3*  --   --   --  9 11 10  --  9  --   --    MONO PCT %  --   --   --   --   --   --   --   --   --   --  5    < > = values in this interval not displayed       Results from last 7 days   Lab Units 06/16/22  0437 06/15/22  2123 06/15/22  0439 06/14/22  1044 06/13/22  0550 06/12/22  0507 06/11/22  1704   SODIUM mmol/L 134* 134* 136 132* 136 136 133*   POTASSIUM mmol/L 4 2 4 3 3 8 4 0 3 7 4 0 4 4   CHLORIDE mmol/L 108 108 107 106 109* 106 105   CO2 mmol/L 17* 18* 21 22 21 16* 18*   ANION GAP mmol/L 9 8 8 4 6 14* 10   BUN mg/dL 23 24 14 15 20 27* 25   CREATININE mg/dL 1 00 1 10 1 04 1 08 1 16 0 96 1 39*   CALCIUM mg/dL 7 7* 7 0* 7 8* 7 5* 7 6* 6 0* 7 4*   GLUCOSE RANDOM mg/dL 143* 136 92 104 86 69 119   ALT U/L  --   --   --   --  9 7 8   AST U/L  --   --   --   --  17 12* 18   ALK PHOS U/L  --   --   --   --  42 30* 40   ALBUMIN g/dL  --   --   --   --  2 4* 1 9* 2 3*   TOTAL BILIRUBIN mg/dL  --   --   --   --  0 75 1 09* 0 39     Results from last 7 days   Lab Units 06/16/22  0437 06/13/22  0550 06/12/22  0507   MAGNESIUM mg/dL 1 8* 2 1 2 3   PHOSPHORUS mg/dL 4 0 2 7 2 7      Results from last 7 days   Lab Units 06/16/22  0437 06/15/22  2123 06/13/22  0550 06/12/22  1752 06/12/22  0507 06/11/22  1704   INR  1 36* 1 51* 1 05 1 12 1 48* 1 40*   PTT seconds  --  29  --   --   --   --           Results from last 7 days   Lab Units 06/15/22  2344 06/15/22  2107 06/12/22  0919   LACTIC ACID mmol/L 1 8 3 6* 0 5     ABG:    VBG:          Micro        EKG: (06/11/2022): WA Interval increased from previous EKG otherwise NSR  Imaging:  I have personally reviewed pertinent reports  Intake and Output  I/O       06/14 0701  06/15 0700 06/15 0701  06/16 0700 06/16 0701 06/17 0700    P  O  1010 720     I V  (mL/kg)  1078 8 (19)     Blood  700     IV Piggyback  350     Total Intake(mL/kg) 1010 (20 1) 2848 8 (50 2)     Net +1010 +2848  8            Unmeasured Urine Occurrence  2 x     Unmeasured Stool Occurrence  2 x         UOP: unmeasured otherwise has urinated 2x's    Height and Weights   Height: 5' 4" (162 6 cm)     Body mass index is 21 49 kg/m²    Weight (last 2 days)     Date/Time Weight    06/16/22 0600 56 8 (125 22)    06/16/22 0238 56 8 (125 22)    06/15/22 2334 56 8 (125 22)    06/15/22 0600 50 2 (110 67)            Nutrition       Diet Orders   (From admission, onward)             Start     Ordered    06/15/22 2359  Diet NPO  Diet effective now        References:    Nutrtion Support Algorithm Enteral vs  Parenteral   Question Answer Comment   Diet Type NPO    RD to adjust diet per protocol? Yes        06/15/22 2358    06/13/22 0841  Dietary nutrition supplements  Once        Question Answer Comment   Select Supplement: Ensure Enlive-Vanilla    Frequency Breakfast, Lunch, Dinner        06/13/22 0841                  Active Medications  Scheduled Meds:  Current Facility-Administered Medications   Medication Dose Route Frequency Provider Last Rate    acetaminophen  650 mg Oral Q6H PRN TOLU Marrero      multi-electrolyte  125 mL/hr Intravenous Continuous TOLU Rod 125 mL/hr (06/16/22 0039)    ondansetron  4 mg Intravenous Q6H PRN TOLU Rod      pantoprozole (PROTONIX) infusion (Continuous)  8 mg/hr Intravenous Continuous TOLU Rod 8 mg/hr (06/16/22 0051)     Continuous Infusions:  multi-electrolyte, 125 mL/hr, Last Rate: 125 mL/hr (06/16/22 0039)  pantoprozole (PROTONIX) infusion (Continuous), 8 mg/hr, Last Rate: 8 mg/hr (06/16/22 0051)      PRN Meds:   acetaminophen, 650 mg, Q6H PRN  ondansetron, 4 mg, Q6H PRN        Allergies   No Known Allergies  ---------------------------------------------------------------------------------------  Advance Directive and Living Will:      Power of :    POLST:    ---------------------------------------------------------------------------------------  Care Time Delivered:   see attending's attestation    Salo Giron,       Portions of the record may have been created with voice recognition software    Occasional wrong word or "sound a like" substitutions may have occurred due to the inherent limitations of voice recognition software    Read the chart carefully and recognize, using context, where substitutions have occurred

## 2022-06-16 NOTE — ASSESSMENT & PLAN NOTE
· Hold home Klonopin and Trazodone  · Seen by Neuropsych, deemed patient to not have capacity to make decisions  · Delirium precautions   · CAM-ICU

## 2022-06-16 NOTE — NURSING NOTE
1930: Patient received awake and alert; weak; noted with bright red blood/clots per rectum  VS: 97 7, 108, 84/50, 16  Resident on call informed  All stat orders/;labs completed; NS bolus started  Type and screen sent  IV access obtained to right AC 20g and left hand/wrist 20g    2054: Patient started on 1/2 unit of ordered PRBC  Transfusion protocol initiated  2100: Received order from Resident to increase rate of transfusion given that patient is pending stat GI scope/EGD and then will be transferred to ICU  V/S: 98 0, 100, 16, 87/47   2115: Patient off the unit to OR  1/2 unit of ordered PRBC continued along with patient who is now off the unit   V/S 86/47, 83, 16, 100% ON 3LNC

## 2022-06-16 NOTE — QUICK NOTE
Informed by nursing that patient had a large maroon-colored stool  On assessment patient appears pale, reports fatigue/weakness  B/P 84/50 P -108  On call GI made aware, recomment plan for urgent endoscopy  Stat H&H sent  Per GI  Reglan 10 mg IV  PPI drip  NPO   1000 cc fluid bolus  Critical care made aware of patient status

## 2022-06-16 NOTE — PROGRESS NOTES
Progress Note - General Surgery  : DAVID Red Surgery Resident on Jesús Sauer 64 y o  female MRN: [id number]  Unit/Bed#: W -01 Encounter: [id number]      Assessment:  64 y o  female with UGIB s/p EGD with epinephrine/cautery evening of 6/15      Plan:  ICU level of care   Trend hgb  PPI      Subjective: Denies abdominal pain      Objective:     Physical Exam:  GEN: NAD   Ab: Soft, NT/ND  Lung: Normal effort on RA  CV: RRR   Extrem: No CCE   Neuro: A+Ox3       I/O       06/14 0701  06/15 0700 06/15 0701 06/16 0700    P  O  1010 720    I V  (mL/kg)      Blood  700    IV Piggyback  250    Total Intake(mL/kg) 1010 (20 1) 1670 (33 3)    Net +1010 +1670                Lab, Imaging and other studies: I have personally reviewed pertinent reports    , CBC with diff:   Lab Results   Component Value Date    WBC 13 54 (H) 06/15/2022    HGB 6 2 (LL) 06/15/2022    HCT 18 9 (L) 06/15/2022    MCV 90 06/15/2022     (H) 06/15/2022    MCH 30 0 06/15/2022    MCHC 33 4 06/15/2022    RDW 17 6 (H) 06/15/2022    MPV 9 1 06/15/2022    NRBC 0 06/15/2022   , BMP/CMP:   Lab Results   Component Value Date    SODIUM 134 (L) 06/15/2022    K 4 3 06/15/2022     06/15/2022    CO2 18 (L) 06/15/2022    BUN 24 06/15/2022    CREATININE 1 10 06/15/2022    CALCIUM 7 0 (L) 06/15/2022    EGFR 48 06/15/2022         VTE Pharmacologic Prophylaxis: Sequential compression device (Calin Shoe)         Larissa Garcia MD  6/15/2022 11:08 PM

## 2022-06-16 NOTE — NURSING NOTE
PCA's attempted to collect lab work, however, pt very anxious  PCA requested assistance from RN, attempted to redirect and educate pt on lab work collection, however, pt continually stating ' this is weird' and refuses to have labs obtained at this time  Pt remains oriented, however, appears anxious and admits to such  Will hold off on obtaining labs at this time, cc team made aware

## 2022-06-16 NOTE — ASSESSMENT & PLAN NOTE
· Patient noted to have neutrophilic leukocytosis on 58/42 AM labs  · No fevers or clinical signs of infection noted  · Likely reactive  · Repeat CBC in AM

## 2022-06-16 NOTE — ASSESSMENT & PLAN NOTE
Lab Results   Component Value Date    [id number] 3 492 05/24/2022     · Hold home Synthroid, resume once cleared for PO by GI

## 2022-06-16 NOTE — OCCUPATIONAL THERAPY NOTE
Occupational Therapy Progress Note     Patient Name: Jesse Griffin  XPWVT'[id number] Date: 6/16/2022  Problem List  Principal Problem:    Acute blood loss anemia  Active Problems:    Hypothyroidism    Depression    Hyponatremia    H/O bariatric surgery - bypass    Gastric ulcer    Ambulatory dysfunction    Neutrophilic leukocytosis    Hypocalcemia    [facility]          06/16/22 1435   OT Last Visit   OT Visit Date 06/16/22  (Thursday)   Note Type   Note Type Cancelled Session   Cancel Reasons Other  (per RN transfer to [facility])     Chart review completed and attempted to see pt for OT tx session  Pt transferred to ICU due to maroon stools and hypotension  Pt is s/p repeat EGD and per RN transfer to [facility] for bariatric consult (remote gastric bypass)  Will cancel OT      Milton Wallace, OTR/L

## 2022-06-16 NOTE — ASSESSMENT & PLAN NOTE
· Likely related to malnutrition and deconditioning  · Continue with PT/OT   · Recommended post acute rehab after discharge

## 2022-06-16 NOTE — PROGRESS NOTES
Progress Note - Geriatric Medicine   Daisy Shea 64 y o  female MRN: [id number]  Unit/Bed#: ICU 05 Encounter: [id number]      Assessment/Plan:  Acute blood loss anemia  Hemoglobin on admission was 4 4, given 2 units of PRBCs inserted IV Protonix in ER  Patient underwent EGD with GI on 06/12/2022 which showed single large deep, irregular, benign-appearing ulcer in the gastrojejunal anastomosis with adherent clot  Placed 2 clips successfully, injected 8ml of epinephrine to address bleeding; hemostasis achieved   Patient was transitioned to Protonix 40 mg p o  B i d  and a GI diet yesterday  Last night patient had large maroon bowel movement with hypo tension  Hemoglobin was found to be 6 2-received an additional 2 units of PRBCs and placed back on Protonix drip  Is currently on Cytotec per general surgery team  Patient underwent emergent EGD: Single large, deep, irregular ulcer in the gastrojejunal anastomosis with nonbleeding visible vessel (Tuan IIA); induced coagulation and hemostasis achieved with 2 applications of bipolar cautery; injected 4 mL of epinephrine to address bleeding; hemostasis achieved  Patient to be transferred to [facility] for bariatric surgery consult    Severe protein malnutrition  Patient currently on p o  Patient with longstanding history of decreased appetite and poor p o  Intake  Will need dietitian consult once able to tolerate p o        Anxiety/depression  With continued anxiety throughout hospital stay  Patient did have increased episode of anxiety requiring IV Ativan  Patient previously was on Klonopin and trazodone, which has been on hold as patient is NPO  Transitioned patient's back to home medication regimen once medically stable  Continue to provide emotional support    Cognitive impairment  Patient is alert oriented x 3 on exam, forgetful and impulsive  At risk for delirium due to cognitive impairment  Recommend delirium precautions  Maintain sleep-wake cycle, avoid nighttime interruptions  Provide adequate pain control  Avoid urinary retention and constipation  Provide frequent and early mobilization  Provide frequent redirection and reorientation as needed  Avoid medications that may worsen or precipitate delirium such as tramadol, benzodiazepines, anticholinergics, and Benadryl  Redirect unwanted behaviors as first-line therapy, avoid physical restraints as able to  Patient was seen by neuropsych and deemed unable to make medical decisions for herself    Ambulatory dysfunction  At a baseline ambulates without assistive device   PT/OT following  Fall precautions  Out of bed as tolerated  Encourage early and frequent mobilization  Encourage adequate hydration and nutrition  Provide adequate pain management   Goal is to be transferred to [address] [place] [address] with PT/OT for continued strength and balance training as tolerated    Subjective:   Patient is being seen for geriatric follow-up  Upon exam patient was lying in bed, resting  She appeared comfortable and was in no acute distress  She said she felt okay, did say she felt hungry but also slightly nauseous  She also admits to having intermittent anxiety over everything going on  She will likely be transferred to an upon hospital once transportation can be arranged for bariatric surgery consult    Review of Systems   Constitutional: Positive for appetite change  Negative for activity change, chills and fever  Respiratory: Negative for cough and shortness of breath  Cardiovascular: Negative for chest pain and palpitations  Gastrointestinal: Positive for nausea  Negative for abdominal distention, abdominal pain, constipation, diarrhea and vomiting  Genitourinary: Negative for difficulty urinating, dysuria, frequency, hematuria and urgency  Musculoskeletal: Positive for arthralgias and gait problem  Negative for back pain  Skin: Negative for color change and rash     Neurological: Positive for dizziness, weakness and light-headedness  Negative for seizures, syncope and headaches  Psychiatric/Behavioral: Positive for dysphoric mood and sleep disturbance  The patient is nervous/anxious  Objective:     Vitals: Blood pressure 111/56, pulse 79, temperature 97 6 °F (36 4 °C), temperature source Oral, resp  rate 22, height 5' 4" (1 626 m), weight 56 8 kg (125 lb 3 5 oz), SpO2 96 %  ,Body mass index is 21 49 kg/m²  Intake/Output Summary (Last 24 hours) at 6/16/2022 1602  Last data filed at 6/16/2022 1201  Gross per 24 hour   Intake 2908 75 ml   Output --   Net 2908 75 ml       Current Medications: Reviewed    Physical Exam:   Physical Exam  Vitals reviewed  Constitutional:       General: She is not in acute distress  Appearance: She is well-developed  She is not ill-appearing  Comments: Frail appearing   HENT:      Head: Normocephalic and atraumatic  Mouth/Throat:      Mouth: Mucous membranes are dry  Pharynx: No oropharyngeal exudate or posterior oropharyngeal erythema  Cardiovascular:      Rate and Rhythm: Normal rate and regular rhythm  Heart sounds: No murmur heard  Pulmonary:      Effort: Pulmonary effort is normal  No respiratory distress  Breath sounds: Normal breath sounds  Abdominal:      General: Abdomen is flat  There is no distension  Palpations: Abdomen is soft  Tenderness: There is no abdominal tenderness  Musculoskeletal:      Right lower leg: Edema (trace) present  Left lower leg: Edema (trace) present  Skin:     General: Skin is warm and dry  Coloration: Skin is pale  Findings: Bruising present  Neurological:      General: No focal deficit present  Mental Status: She is alert and oriented to person, place, and time  Mental status is at baseline  Cranial Nerves: No cranial nerve deficit  Motor: Weakness present        Gait: Gait abnormal       Comments: forgetful at times   Psychiatric:         Mood and Affect: Mood is anxious  Invasive Devices  Report    Peripheral Intravenous Line  Duration           Peripheral IV 06/15/22 Distal;Left;Ventral (anterior) Forearm 1 day    Peripheral IV 06/16/22 Left;Proximal;Ventral (anterior) Forearm <1 day                Lab, Imaging and other studies: I have personally reviewed pertinent reports

## 2022-06-16 NOTE — ASSESSMENT & PLAN NOTE
Lab Results   Component Value Date    [id number] 3 492 05/24/2022     · Hold home Synthroid, resume once cleared for PO by GI

## 2022-06-16 NOTE — ASSESSMENT & PLAN NOTE
Malnutrition Findings:   Adult Malnutrition type: Chronic illness  Adult Degree of Malnutrition: Other severe protein calorie malnutrition  Malnutrition Characteristics: Muscle loss, Weight loss, Fat loss                  360 Statement: Severe malnutrition r/t inadequate intake as evidenced by 13% weight loss over 4 months, orbitals hollow, muscle wasting present to temples; nutritional supplements ordered once diet advances    BMI Findings: Body mass index is 21 49 kg/m²         · Consult nutrition   · Encourage PO intake  · Nutritional supplements

## 2022-06-16 NOTE — ASSESSMENT & PLAN NOTE
ASSESSMENT  · Admitted on 6/11 with acute blood loss anemia with an Hgb of 4 4 in ED given 2 units pRBC's  · Recent admission (05/24) for symptomatic anemia and discharged with Hgb of 8 3     · Hx of Gastric ulcers, see below  · Currently not on any AC/AP at home  · Started on IV Protonix and consulted GI  · She did receive an additional 2 units PRBC's on 6/12 for anemia hgb 5 9   · Pt underwent an EGD on 6/12 which showed a single large, deep, irregular, benign-appearing ulcer in the gastrojejunal anastomosis with adherent clot  Placed 2 clips, injected epinephrine, hemostasis achieved  -  · ICU AP called by [facility] last night to evaluate pt  · Had a large maroon/burgandy BM which was followed by hypotension (80's/40's) and tachycardia (110's)  · Pt complaining of lower ABD pain, cramping in nature  · Pt appeared pale, tachypneic and weak  · STAT labs: Hgb 6 2, down from 9 9 @ 1300  · Lactic 3 6  · GI already following, was called to evaluate and she was taken for an emergent EGD  · (06/15):  EGD Single large, deep, irregular ulcer in the gastrojejunal anastomosis with nonbleeding visible vessel  Induced coagulation and hemostasis achieved with 2 applications of bipolar cautery, epinephrine injection; hemostasis achieved    · Surgery consulted Prior to EGD  · Received 1L NSS and 250mL Albumin and 2 units PRBC's  · Given 80mg IV Protonix and started on Protonix gtt @ 8mg/hr  · Total products: 6 units PRBC's  · (06/16): PICC consent  · Surgery recommend Misoprostol 200mg PO AC/HS, will hold for now while  PLAN  · Continue to trend H/H q4h  · Transfuse for Hgb < 7 or active bleeding, s/s hemodynamic instability  · Follow up lactate   · Continue Isolyte @ 125cc/hr  · Maintain 2 large bore IV's, pt a difficult stick may need a PICC line  · Monitor VS  · Consider Gen surg recommendations for bariatric surgery /elective surgery  · Continue NPO  · Zofran PRN for N/V  · GI following  · Acute GI blood loss anemia, peptic ulcer disease, gastric ulcer, hematemesis, bariatric surgery status-   s/p EGD x3 with anastomotic ulcer s/p hemoclip, bicap and epi  Transfer to bariatric service at [facility] planned  Monitor hemoglobin, transfuse as needed  Appreciate surgery input  Would recommend IR vs surgical intervention if she re-bleeds  Continue PPI drip and carafate     · Follow up on biopsies

## 2022-06-16 NOTE — ASSESSMENT & PLAN NOTE
· Sodium 134 on most recent labs  · Has had issues with hyponatremia this admission  · Continue Isolyte while NPO  · Repeat BMP in AM  · Continue to monitor

## 2022-06-17 ENCOUNTER — APPOINTMENT (INPATIENT)
Dept: NON INVASIVE DIAGNOSTICS | Facility: HOSPITAL | Age: 77
DRG: 377 | End: 2022-06-17
Payer: COMMERCIAL

## 2022-06-17 LAB
ABO GROUP BLD BPU: NORMAL
ABO GROUP BLD BPU: NORMAL
ALBUMIN SERPL BCP-MCNC: 2 G/DL (ref 3.5–5)
ALP SERPL-CCNC: 38 U/L (ref 46–116)
ALT SERPL W P-5'-P-CCNC: 12 U/L (ref 12–78)
ANION GAP SERPL CALCULATED.3IONS-SCNC: 7 MMOL/L (ref 4–13)
AST SERPL W P-5'-P-CCNC: 16 U/L (ref 5–45)
BASOPHILS # BLD AUTO: 0.03 THOUSANDS/ΜL (ref 0–0.1)
BASOPHILS NFR BLD AUTO: 0 % (ref 0–1)
BILIRUB SERPL-MCNC: 0.56 MG/DL (ref 0.2–1)
BPU ID: NORMAL
BPU ID: NORMAL
BUN SERPL-MCNC: 22 MG/DL (ref 5–25)
CA-I BLD-SCNC: 1.15 MMOL/L (ref 1.12–1.32)
CALCIUM ALBUM COR SERPL-MCNC: 9.2 MG/DL (ref 8.3–10.1)
CALCIUM SERPL-MCNC: 7.6 MG/DL (ref 8.3–10.1)
CHLORIDE SERPL-SCNC: 106 MMOL/L (ref 100–108)
CO2 SERPL-SCNC: 25 MMOL/L (ref 21–32)
CREAT SERPL-MCNC: 1.17 MG/DL (ref 0.6–1.3)
CROSSMATCH: NORMAL
CROSSMATCH: NORMAL
EOSINOPHIL # BLD AUTO: 0.62 THOUSAND/ΜL (ref 0–0.61)
EOSINOPHIL NFR BLD AUTO: 6 % (ref 0–6)
ERYTHROCYTE [DISTWIDTH] IN BLOOD BY AUTOMATED COUNT: 18.5 % (ref 11.6–15.1)
GFR SERPL CREATININE-BSD FRML MDRD: 44 ML/MIN/1.73SQ M
GLUCOSE SERPL-MCNC: 71 MG/DL (ref 65–140)
HCT VFR BLD AUTO: 20.8 % (ref 34.8–46.1)
HCT VFR BLD AUTO: 22.5 % (ref 34.8–46.1)
HCT VFR BLD AUTO: 23.3 % (ref 34.8–46.1)
HGB BLD-MCNC: 7 G/DL (ref 11.5–15.4)
HGB BLD-MCNC: 7.5 G/DL (ref 11.5–15.4)
HGB BLD-MCNC: 7.8 G/DL (ref 11.5–15.4)
IMM GRANULOCYTES # BLD AUTO: 0.11 THOUSAND/UL (ref 0–0.2)
IMM GRANULOCYTES NFR BLD AUTO: 1 % (ref 0–2)
LYMPHOCYTES # BLD AUTO: 2.74 THOUSANDS/ΜL (ref 0.6–4.47)
LYMPHOCYTES NFR BLD AUTO: 27 % (ref 14–44)
MAGNESIUM SERPL-MCNC: 1.8 MG/DL (ref 1.6–2.6)
MCH RBC QN AUTO: 30.2 PG (ref 26.8–34.3)
MCHC RBC AUTO-ENTMCNC: 33.7 G/DL (ref 31.4–37.4)
MCV RBC AUTO: 90 FL (ref 82–98)
MONOCYTES # BLD AUTO: 0.95 THOUSAND/ΜL (ref 0.17–1.22)
MONOCYTES NFR BLD AUTO: 10 % (ref 4–12)
NEUTROPHILS # BLD AUTO: 5.57 THOUSANDS/ΜL (ref 1.85–7.62)
NEUTS SEG NFR BLD AUTO: 56 % (ref 43–75)
NRBC BLD AUTO-RTO: 0 /100 WBCS
PHOSPHATE SERPL-MCNC: 2.9 MG/DL (ref 2.3–4.1)
PLATELET # BLD AUTO: 367 THOUSANDS/UL (ref 149–390)
PMV BLD AUTO: 9.2 FL (ref 8.9–12.7)
POTASSIUM SERPL-SCNC: 3 MMOL/L (ref 3.5–5.3)
PROT SERPL-MCNC: 5.1 G/DL (ref 6.4–8.2)
RBC # BLD AUTO: 2.32 MILLION/UL (ref 3.81–5.12)
SODIUM SERPL-SCNC: 138 MMOL/L (ref 136–145)
UNIT DISPENSE STATUS: NORMAL
UNIT DISPENSE STATUS: NORMAL
UNIT PRODUCT CODE: NORMAL
UNIT PRODUCT CODE: NORMAL
UNIT PRODUCT VOLUME: 350 ML
UNIT PRODUCT VOLUME: 350 ML
UNIT RH: NORMAL
UNIT RH: NORMAL
WBC # BLD AUTO: 10.02 THOUSAND/UL (ref 4.31–10.16)

## 2022-06-17 PROCEDURE — 85025 COMPLETE CBC W/AUTO DIFF WBC: CPT | Performed by: PHYSICIAN ASSISTANT

## 2022-06-17 PROCEDURE — 85018 HEMOGLOBIN: CPT | Performed by: NURSE PRACTITIONER

## 2022-06-17 PROCEDURE — 82330 ASSAY OF CALCIUM: CPT | Performed by: INTERNAL MEDICINE

## 2022-06-17 PROCEDURE — 99222 1ST HOSP IP/OBS MODERATE 55: CPT | Performed by: PHYSICIAN ASSISTANT

## 2022-06-17 PROCEDURE — 83735 ASSAY OF MAGNESIUM: CPT | Performed by: PHYSICIAN ASSISTANT

## 2022-06-17 PROCEDURE — 80053 COMPREHEN METABOLIC PANEL: CPT | Performed by: PHYSICIAN ASSISTANT

## 2022-06-17 PROCEDURE — 85014 HEMATOCRIT: CPT | Performed by: NURSE PRACTITIONER

## 2022-06-17 PROCEDURE — 99222 1ST HOSP IP/OBS MODERATE 55: CPT | Performed by: STUDENT IN AN ORGANIZED HEALTH CARE EDUCATION/TRAINING PROGRAM

## 2022-06-17 PROCEDURE — 99233 SBSQ HOSP IP/OBS HIGH 50: CPT | Performed by: INTERNAL MEDICINE

## 2022-06-17 PROCEDURE — C9113 INJ PANTOPRAZOLE SODIUM, VIA: HCPCS | Performed by: PHYSICIAN ASSISTANT

## 2022-06-17 PROCEDURE — 84100 ASSAY OF PHOSPHORUS: CPT | Performed by: PHYSICIAN ASSISTANT

## 2022-06-17 PROCEDURE — 93970 EXTREMITY STUDY: CPT | Performed by: SURGERY

## 2022-06-17 PROCEDURE — 93970 EXTREMITY STUDY: CPT

## 2022-06-17 RX ORDER — POTASSIUM CHLORIDE 20 MEQ/1
40 TABLET, EXTENDED RELEASE ORAL 2 TIMES DAILY
Status: DISCONTINUED | OUTPATIENT
Start: 2022-06-17 | End: 2022-06-28 | Stop reason: HOSPADM

## 2022-06-17 RX ORDER — CLONAZEPAM 1 MG/1
1 TABLET ORAL
Status: DISCONTINUED | OUTPATIENT
Start: 2022-06-17 | End: 2022-06-28 | Stop reason: HOSPADM

## 2022-06-17 RX ORDER — LEVOTHYROXINE SODIUM 112 UG/1
112 TABLET ORAL
Status: DISCONTINUED | OUTPATIENT
Start: 2022-06-17 | End: 2022-06-28 | Stop reason: HOSPADM

## 2022-06-17 RX ADMIN — SODIUM CHLORIDE 8 MG/HR: 9 INJECTION, SOLUTION INTRAVENOUS at 08:14

## 2022-06-17 RX ADMIN — MISOPROSTOL 200 MCG: 200 TABLET ORAL at 09:19

## 2022-06-17 RX ADMIN — MISOPROSTOL 200 MCG: 200 TABLET ORAL at 11:15

## 2022-06-17 RX ADMIN — MISOPROSTOL 200 MCG: 200 TABLET ORAL at 16:33

## 2022-06-17 RX ADMIN — POTASSIUM CHLORIDE 40 MEQ: 20 TABLET, EXTENDED RELEASE ORAL at 18:12

## 2022-06-17 RX ADMIN — SUCRALFATE 1 G: 1 TABLET ORAL at 22:49

## 2022-06-17 RX ADMIN — LEVOTHYROXINE SODIUM 112 MCG: 112 TABLET ORAL at 10:14

## 2022-06-17 RX ADMIN — SUCRALFATE 1 G: 1 TABLET ORAL at 11:14

## 2022-06-17 RX ADMIN — CLONAZEPAM 1 MG: 1 TABLET ORAL at 22:48

## 2022-06-17 RX ADMIN — MISOPROSTOL 200 MCG: 200 TABLET ORAL at 22:49

## 2022-06-17 RX ADMIN — SUCRALFATE 1 G: 1 TABLET ORAL at 09:19

## 2022-06-17 RX ADMIN — POTASSIUM CHLORIDE 40 MEQ: 20 TABLET, EXTENDED RELEASE ORAL at 11:14

## 2022-06-17 RX ADMIN — SODIUM CHLORIDE 8 MG/HR: 9 INJECTION, SOLUTION INTRAVENOUS at 20:33

## 2022-06-17 RX ADMIN — SUCRALFATE 1 G: 1 TABLET ORAL at 16:33

## 2022-06-17 NOTE — ASSESSMENT & PLAN NOTE
· Patient reports remote history of Savage-en-Y gastric bypass approximately 12 years ago, at [facility]   · Will consult bariatric surgery during this admission

## 2022-06-17 NOTE — ASSESSMENT & PLAN NOTE
· Bilateral leg edema up to ankle noted on admission  · Denied chronic swelling of her legs  · She has received multiple IVF and blood transfusion since admission  · Likely due to low protein, third spacing and physical decondition  · Will check LE U/S to rule out DVTs

## 2022-06-17 NOTE — ASSESSMENT & PLAN NOTE
· Likely d/t blood transfusions  · Asymptomatic  · Repleted with 2g calcium gluconate  · Recheck ionized Ca

## 2022-06-17 NOTE — ASSESSMENT & PLAN NOTE
· Patient reports remote history of Savage-en-Y gastric bypass approximately 12 years ago, at [facility]   · Will consult bariatric surgery during this admission

## 2022-06-17 NOTE — ASSESSMENT & PLAN NOTE
· Admitted on 6/11 with acute blood loss anemia with an Hgb of 4 4 in ED given 2 units pRBC's  · Recent admission (05/24) for symptomatic anemia and discharged with Hgb of 8 3     · Hx of Gastric ulcers, see below  · Currently not on any AC/AP at home  · Started on IV Protonix and consulted GI  · EGD on 6/12 which showed a single large, deep, irregular, benign-appearing ulcer in the gastrojejunal anastomosis with adherent clot  Placed 2 clips, injected epinephrine, hemostasis achieved  · Emergent EGD on 6/15 showed Single large, deep, irregular ulcer in the gastrojejunal anastomosis with nonbleeding visible vessel  Induced coagulation and hemostasis achieved with 2 applications of bipolar cautery, epinephrine injection; hemostasis achieved    · Total products: 6 units PRBC's  · Surgery was consulted and recommended Misoprostol 200mg PO AC/HS  PLAN  · Continue to trend H/H q4h  · Transfuse for Hgb < 7 or active bleeding  · Continue Isolyte @ 75cc/hr  · Continue protonix drip  · Consult bariatric surgery  · Continue NPO  · Zofran PRN for N/V  · GI following  · Follow up on biopsies

## 2022-06-17 NOTE — ASSESSMENT & PLAN NOTE
· Recent admission for symptomatic anemia  EGD during that admission with noted ulcer, no active bleeding  Patient was to have follow up EGD and to continue PPI and Carafate  · Acute GI blood loss anemia, peptic ulcer disease, gastric ulcer, hematemesis, bariatric surgery status-   s/p EGD x3 with anastomotic ulcer s/p hemoclip, bicap and epi  · Monitor hemoglobin, transfuse as needed  · Consult bariatric surgery  · Continue PPI drip and carafate

## 2022-06-17 NOTE — UTILIZATION REVIEW
Notification of Discharge   This is a Notification of Discharge from our facility [address]  Please be advised that this patient has been discharge from our facility  Below you will find the admission and discharge date and time including the patients disposition  UTILIZATION REVIEW CONTACT:  Demetra Mares MA  Utilization   Network Utilization Review Department  Phone: [phone] x carefully listen to the prompts  All voicemails are confidential   Email: [email or web address]     PHYSICIAN ADVISORY SERVICES:  FOR [employer]-OYED REVIEW - MEDICAL NECESSITY DENIAL  Phone: [phone]  Fax: [phone]  Email: [email or web address]     PRESENTATION DATE: 6/11/2022  4:40 PM  OBERVATION ADMISSION DATE:   INPATIENT ADMISSION DATE: 6/11/22  7:41 PM   DISCHARGE DATE: 6/16/2022  8:00 PM  DISPOSITION: [address] [facility]      IMPORTANT INFORMATION:  Send all requests for admission clinical reviews, approved or denied determinations and any other requests to dedicated fax number below belonging to the campus where the patient is receiving treatment   List of dedicated fax numbers:  [address] DENIALS (Administrative/Medical Necessity) [phone]   [address] (Maternity/NICU/Pediatrics) [phone]   [facility] [phone]   Jostin Barriose [phone]   [facility] [phone]   [address] [place] [address],[address] [phone]   [facility]  [phone]   [address] [facility] [address] [facility]  [address] [facility] 1000 [facility] [phone]

## 2022-06-17 NOTE — ASSESSMENT & PLAN NOTE
Recent Labs     06/15/22  0439 06/15/22  2123 06/16/22  0437   SODIUM 136 134* 134*     · Likely due to NPO status   · Continue IVF  · Monitor BMP

## 2022-06-17 NOTE — ASSESSMENT & PLAN NOTE
· Likely d/t blood transfusions  · Asymptomatic  · Repleted with 2g calcium gluconate  · Recheck ionized Ca good, to achieve stated therapy goals

## 2022-06-17 NOTE — PHYSICAL THERAPY NOTE
Physical Therapy Cancellation Note             06/17/22 1125   PT Last Visit   PT Visit Date 06/17/22   Note Type   Note type Evaluation; Cancelled Session   Cancel Reasons Refusal   Additional Comments pt verbalize understanding to benefits of therapy evaluation during hospital stay  agreeable to participate at later time  will continue to follow given patient willingness to participate           Anna Colón, PT

## 2022-06-17 NOTE — ASSESSMENT & PLAN NOTE
· Recent admission for symptomatic anemia  EGD during that admission with noted ulcer, no active bleeding  Patient was to have follow up EGD and to continue PPI and Carafate  · Again admitted for acute GI blood loss anemia, likely secondary to peptic ulcer disease with bariatric surgery status   s/p EGD x2 with anastomotic ulcer s/p hemoclip, bicap and epi  · Monitor hemoglobin, transfuse as needed  · Consult bariatric surgery  · Continue PPI drip and carafate

## 2022-06-17 NOTE — PROGRESS NOTES
[address] [facility]  Progress Note - Terry Phelps 1945, 64 y o  female MRN: [id number]  Unit/Bed#: ICU 11 Encounter: [id number]  Primary Care Provider: Jordon Hall MD   Date and time admitted to hospital: 6/16/2022  9:16 PM    Acute blood loss anemia  Assessment & Plan  · Admitted on 6/11 with acute blood loss anemia with an Hgb of 4 4 in ED given 2 units pRBC's  · Recent admission (05/24) for symptomatic anemia and discharged with Hgb of 8 3     · Hx of Gastric ulcers, see below  · Currently not on any AC/AP at home  · Started on IV Protonix and consulted GI  · EGD on 6/12 which showed a single large, deep, irregular, benign-appearing ulcer in the gastrojejunal anastomosis with adherent clot  Placed 2 clips, injected epinephrine, hemostasis achieved  · Emergent EGD on 6/15 showed Single large, deep, irregular ulcer in the gastrojejunal anastomosis with nonbleeding visible vessel  Induced coagulation and hemostasis achieved with 2 applications of bipolar cautery, epinephrine injection; hemostasis achieved  · Total products: 6 units PRBC's  · Surgery was consulted and recommended Misoprostol 200mg PO AC/HS  PLAN  · Continue to trend H/H q4h  · Transfuse for Hgb < 7 or active bleeding  · Continue Isolyte @ 75cc/hr  · Continue protonix drip  · Consult bariatric surgery  · Continue NPO  · Zofran PRN for N/V  · GI following  · Follow up on biopsies      Gastric ulcer  Assessment & Plan  · Recent admission for symptomatic anemia  EGD during that admission with noted ulcer, no active bleeding  Patient was to have follow up EGD and to continue PPI and Carafate  · Again admitted for acute GI blood loss anemia, likely secondary to peptic ulcer disease with bariatric surgery status   s/p EGD x2 with anastomotic ulcer s/p hemoclip, bicap and epi  · Monitor hemoglobin, transfuse as needed  · Consult bariatric surgery  · Continue PPI drip and carafate       Hypocalcemia  Assessment & Plan  · Likely d/t blood transfusions  · Asymptomatic  · Repleted with 2g calcium gluconate  · Recheck ionized Ca     Hyponatremia  Assessment & Plan  Recent Labs     06/15/22  0439 06/15/22  2123 06/16/22  0437   SODIUM 136 134* 134*     · Likely due to NPO status   · Continue IVF  · Monitor BMP    Neutrophilic leukocytosis  Assessment & Plan  · No clear signs of infection  · Likely reactive  · Monitor CBC and fever curve        Ambulatory dysfunction  Assessment & Plan  · Likely related to malnutrition and deconditioning  · PT OT eval and treat  · Likely post acute rehab after discharge  ·       H/O bariatric surgery - bypass  Assessment & Plan  · Patient reports remote history of Savage-en-Y gastric bypass approximately 12 years ago, at [facility]   · Will consult bariatric surgery during this admission  · Seen today by bariatric surgery fellow, they will follow patient for now  Depression  Assessment & Plan  · Hold home Klonopin and Trazodone  · Seen by Neuropsych, deemed patient to not have capacity to make decisions  · Delirium precautions   · CAM-ICU       Hypothyroidism  Assessment & Plan  Lab Results   Component Value Date    [id number] 3 492 05/24/2022     ·  Patient taking meds with sips    Bilateral leg edema  Assessment & Plan  · Bilateral leg edema up to ankle noted on admission  · Denied chronic swelling of her legs  · She has received multiple IVF and blood transfusion since admission  · Likely due to low protein, third spacing and physical decondition  · Will check LE U/S to rule out DVTs    Severe protein-calorie malnutrition ([facility] [address] )  Assessment & Plan  BMI 21 49  · Consult nutrition and utritional supplements  when able to have PO intake        HPI/24hr events:   Over night the patient was found to have a hemoglobin of 4 4  She was transfused 2 units of packed red cells, with resulting hemoglobin of 7 0    She has a history of Savage-en-Y gastric bypass done at [facility], date unknown  Patient has had multiple issues since that surgery  She  presented to [address] [place] [address] recently with complaints of hematemesis and bright red blood per rectum  EGD done at at [facility] demonstrated a large nonbleeding ulcer at the gastro jejunal anastomosis  She was transferred to the medical-surgical unit and where she had a melanotic stool, became pale and dizzy  At that time an emergent EGD was performed (6/15)  That procedure demonstrated a large deep irregular ulcer in the gastrojejunal anastomosis with nonbleeding visible vessel  Cautery and epinephrine injection achieved hemostasis  She was transferred to [facility]  for bariatric surgery consult  Medications:  Current Facility-Administered Medications   Medication Dose Route Frequency Provider Last Rate    acetaminophen  650 mg Oral Q6H PRN Tomasz Yeung PA-C      levothyroxine  112 mcg Oral Early Morning Sheliah Race, CRNP      misoprostol  200 mcg Oral 4x Daily (with meals and at bedtime) Tomasz Yeung PA-C      multi-electrolyte  75 mL/hr Intravenous Continuous Karole Right Firth, CRNP 75 mL/hr (06/16/22 2203)    ondansetron  4 mg Intravenous Q6H PRN Tomasz Yeung PA-C      pantoprozole (PROTONIX) infusion (Continuous)  8 mg/hr Intravenous Continuous SHELDON ArmentaC 8 mg/hr (06/17/22 3914)    potassium chloride  40 mEq Oral BID Sheliah Race, CRNP      sucralfate  1 g Oral 4x Daily (AC & HS) Tomasz Yeung PA-C         multi-electrolyte, 75 mL/hr, Last Rate: 75 mL/hr (06/16/22 2203)  pantoprozole (PROTONIX) infusion (Continuous), 8 mg/hr, Last Rate: 8 mg/hr (06/17/22 5741)          Physical exam:  Vitals: Body mass index is 21 72 kg/m²  Blood pressure (!) 93/44, pulse 68, temperature 98 8 °F (37 1 °C), resp  rate (!) 24, height 5' 4" (1 626 m), weight 57 4 kg (126 lb 8 7 oz), SpO2 100 %  ,  Temp  Min: 97 °F (36 1 °C)  Max: 100 4 °F (38 °C)       SpO2: 100 %  SpO2 Activity: At Rest  O2 Device: None (Room air)      Intake/Output Summary (Last 24 hours) at 6/17/2022 1055  Last data filed at 6/17/2022 0400  Gross per 24 hour   Intake 523 59 ml   Output --   Net 523 59 ml       Invasive/non-invasive ventilation settings:   Respiratory  Report   Lab Data (Last 4 hours)    None         O2/Vent Data (Last 4 hours)    None              Invasive Devices  Report    Peripheral Intravenous Line  Duration           Peripheral IV 06/15/22 Distal;Left;Ventral (anterior) Forearm 2 days    Peripheral IV 06/16/22 Left;Proximal;Ventral (anterior) Forearm 1 day                  Physical Exam:  Gen:  Pleasant and cooperative  HEENT:  Atraumatic normocephalic pupils equal reactive light extraocular movements intact sclerae anicteric oral mucosa is pink and moist   There is temporal wasting noted  Neck:  Supple no JVD no lymphadenopathy trachea midline  Chest:  Respirations are even nonlabored on room air  Rales noted bilaterally  Cor:  Regular rate and rhythm no murmurs rubs or gallops appreciated  Abd:  Soft with positive bowel sounds  Ext:  No edema of the bilateral lower extremities noted  Neuro:  Alert and oriented x3 moves all extremities  Skin:  Warm dry and intact      Diagnostic Data:  Lab: I have personally reviewed pertinent lab results  CBC:   Results from last 7 days   Lab Units 06/17/22  0536 06/16/22  2248 06/16/22  1620 06/16/22  1321 06/16/22  0437 06/15/22  2344   WBC Thousand/uL 10 02  --   --   --  11 46* 12 02*   HEMOGLOBIN g/dL 7 0* 7 6* 7 4*   < > 9 0* 8 4*   HEMATOCRIT % 20 8* 22 2* 21 3*   < > 25 5* 24 8*   PLATELETS Thousands/uL 367  --   --   --  341 375    < > = values in this interval not displayed         CMP:   Results from last 7 days   Lab Units 06/17/22  0536 06/16/22  0437 06/15/22  2123 06/14/22  1044 06/13/22  0550 06/12/22  0507   SODIUM mmol/L 138 134* 134*   < > 136 136   POTASSIUM mmol/L 3 0* 4 2 4 3   < > 3 7 4 0   CHLORIDE mmol/L 106 108 108   < > 109* 106   CO2 mmol/L 25 17* 18*   < > 21 16*   BUN mg/dL 22 23 24   < > 20 27*   CREATININE mg/dL 1 17 1 00 1 10   < > 1 16 0 96   CALCIUM mg/dL 7 6* 7 7* 7 0*   < > 7 6* 6 0*   ALK PHOS U/L 38*  --   --   --  42 30*   ALT U/L 12  --   --   --  9 7   AST U/L 16  --   --   --  17 12*    < > = values in this interval not displayed  PT/INR:   No results found for: PT, INR,   Magnesium:   Results from last 7 days   Lab Units 06/17/22  0536 06/16/22  0437 06/13/22  0550   MAGNESIUM mg/dL 1 8 1 8* 2 1     Phosphorous:   Results from last 7 days   Lab Units 06/17/22  0536 06/16/22  0437 06/13/22  0550   PHOSPHORUS mg/dL 2 9 4 0 2 7       Microbiology:      No pending microbiology  Imaging:  Lower limb venous duplex is pending    Cardiac lab/EKG/telemetry/ECHO:   Normal sinus rhythm    VTE Prophylaxis:  SCDs    Code Status: Level 1 - Full Code    TOLU Adam    Portions of the record may have been created with voice recognition software  Occasional wrong word or "sound a like" substitutions may have occurred due to the inherent limitations of voice recognition software  Read the chart carefully and recognize, using context, where substitutions have occurred

## 2022-06-17 NOTE — UTILIZATION REVIEW
Initial Clinical Review    Admission: Date/Time/Statement:   Admission Orders (From admission, onward)     Ordered        06/16/22 2122  Inpatient Admission  Once                      Orders Placed This Encounter   Procedures    Inpatient Admission     Standing Status:   Standing     Number of Occurrences:   1     Order Specific Question:   Level of Care     Answer:   Level 1 Stepdown [13]     Order Specific Question:   Estimated length of stay     Answer:   More than 2 Midnights     Order Specific Question:   Certification     Answer:   I certify that inpatient services are medically necessary for this patient for a duration of greater than two midnights  See H&P and MD Progress Notes for additional information about the patient's course of treatment  ED Arrival Information     Patient not seen in ED                         Initial Presentation: 64 y o  female transferred from [facility] to [facility] for evaluation by Bariatric surgery for ongoing  GI bleed   PMH of Savage-en-Y gastric bypass, depression, gastric ulcer, hypothyroidism presented to [facility] w hematemesis & bloody BM  Workup incl IV Protonix, blood transfusion, ICU management  EGD 6/12 revealing large deep ulcer in the gastrojejunal anastomosis with adherent clot status post clips and epinephrine injection  Transferred to Med surg  but had another bloody BM w hypotension, pale/ dizzy,  HGB 6 2  TX & transferred to ICU for monitoring prior to transfer   Total products 6 pRBC  EXAM  ABD tenderness, LE edema  Light headed  PLAN  Consult Surgery & recommend Misoprostol 200mg PO AC/ HS, trend H&H q4hr; TX for HGB< 7 or active bleeding; IVF @ 75 cc/HR, IV Protonix drip, Carafate & consult Bariatrics  NPO  PRN anti emetics  GI following, follow up biopsies  Obtain LE U/S rule out DVT  Consult Nutrition  Monitor CBC/BMP  Synthroid when able PO  GI  Patient w addition BM  Cytotoxic often cause diarrhea; if significant re bleeding contact IR  Complains of weakness/ fatigue  No ABD pain or vomiting; appetite poor prior to admit & losing wt; reports not taking PPI faithfully  Date:  6/7/2022   Day 2:   Critical Care  Received 2 U pRBC overnight w resulting HGB=7  Cont IV PPI, gentle IV hydration; replete electrolytes  Seen by neuro Psyche & deemed not to have capacity to make decisions   Cont delirum precautions  HX of Savage en Y gastric bypass approx 12 yr prior,  date unknown  Multiple issues since surgery  Transferred for Bariatric surgery Consult for management  Bariatric Surgery  Exam tachypnea, labile BP  RNYGB at [facility] in 2012  presented to [facility] ER on 6/11/22 with UGIB  Undergone two EGDs with GI on 6/12 and 6/15 revealing  a large, deep, irregular ulcer at the [address] [facility] anastomosis with nonbleeding visible vessel  Induced coagulation and hemostasis achieved with 2 applications of bipolar cautery, epinephrine injection  Since admission she has received 6 U pRBCs   Patient had melanotic stools over the past several days, however stool this  AM wo blood  Cont IV PPI, trend H&H Q 4hr & TX for HGB <7; NPO  Antiemetics prn; Follow GI recommendations & ICU management   If  rebleeds contact GI & IR for possible intervention prior to taking patient to OR   ED Triage Vitals   Temperature Pulse Respirations Blood Pressure SpO2   06/16/22 2130 06/16/22 2125 06/16/22 2125 06/16/22 2130 06/16/22 2125   (!) 97 2 °F (36 2 °C) 84 20 105/65 99 %      Temp Source Heart Rate Source Patient Position - Orthostatic VS BP Location FiO2 (%)   06/16/22 2130 06/16/22 2130 06/16/22 2130 06/16/22 2130 --   Temporal Monitor Lying Right arm       Pain Score       06/16/22 2049       No Pain          Wt Readings from Last 1 Encounters:   06/16/22 57 4 kg (126 lb 8 7 oz)     Additional Vital Signs:   Date/Time Temp Pulse Resp BP MAP (mmHg) SpO2 O2 Device Patient Position - Orthostatic VS   06/17/22 1050 98 8 °F (37 1 °C) -- -- -- -- -- -- --   06/17/22 0720 98 5 °F (36 9 °C) -- -- -- -- -- -- --   06/17/22 0715 -- 68 24 Abnormal  93/44 Abnormal  58 Abnormal  100 % -- --   06/17/22 0615 -- 64 21 82/48 Abnormal  67 98 % -- --   06/17/22 0415 -- 84 41 Abnormal  100/43 Abnormal  71 99 % -- --   06/17/22 0315 -- 74 24 Abnormal  110/50 72 98 % -- --   06/17/22 0255 98 3 °F (36 8 °C) -- -- -- -- -- -- --   06/17/22 0215 -- 72 20 103/45 Abnormal  65 99 % -- --   06/17/22 0115 -- 80 27 Abnormal  123/56 84 99 % -- --   06/17/22 0005 -- 76 25 Abnormal  116/50 76 99 % -- --   06/16/22 2315 -- 72 19 136/59 77 97 % -- --   06/16/22 2300 97 2 °F (36 2 °C) Abnormal  74 21 -- -- 97 % -- --   06/16/22 2215 -- 76 22 105/65 82 99 % -- --   06/16/22 2130 97 2 °F (36 2 °C) Abnormal  76 22 105/65 82 99 % None (Room air) Lying   06/16/22 2125 -- 84 20 -- -- 99 % -- --       Weights (last 14 days)    Date/Time Weight Weight Method Height   06/16/22 2130 57 4 kg (126 lb 8 7 oz) Bed scale 5' 4" (1 626 m)   06/16/22 2123 57 4 kg (126 lb 8 7 oz) Bed scale --       Pertinent Labs/Diagnostic Test Results:   VAS lower limb venous duplex study, complete bilateral   Final Result by Harsh Clayton MD (06/17 1219)   RIGHT LOWER LIMB:  No evidence of acute or chronic deep vein thrombosis  No evidence of superficial thrombophlebitis noted  Doppler evaluation shows a normal response to augmentation maneuvers  Popliteal, posterior tibial and anterior tibial arterial Doppler waveforms are  triphasic/biphasic  LEFT LOWER LIMB:  No evidence of acute or chronic deep vein thrombosis  No evidence of superficial thrombophlebitis noted  Doppler evaluation shows a normal response to augmentation maneuvers  Popliteal, posterior tibial and anterior tibial arterial Doppler waveforms are  triphasic/biphasic                Results from last 7 days   Lab Units 06/17/22  1125 06/17/22  0536 06/16/22  2248 06/16/22  1620 06/16/22  1321 06/16/22  0437 06/15/22  2344 06/15/22  1300 06/15/22  0439 06/12/22  0102 06/11/22  1704   WBC Thousand/uL  --  10 02  --   --   --  11 46* 12 02*  --  13 54*   < > 13 08*   HEMOGLOBIN g/dL 7 8* 7 0* 7 6* 7 4* 7 6* 9 0* 8 4*   < > 9 9*   < > 4 4*   HEMATOCRIT % 23 3* 20 8* 22 2* 21 3* 23 4* 25 5* 24 8*   < > 29 6*   < > 14 9*   PLATELETS Thousands/uL  --  367  --   --   --  341 375  --  608*   < > 635*   NEUTROS ABS Thousands/µL  --  5 57  --   --   --  8 92*  --   --  9 29*   < >  --    BANDS PCT %  --   --   --   --   --   --   --   --   --   --  3    < > = values in this interval not displayed           Results from last 7 days   Lab Units 06/17/22  0536 06/16/22  0437 06/15/22  2344 06/15/22  2123 06/15/22  0439 06/14/22  1044 06/13/22  0550 06/12/22  0507   SODIUM mmol/L 138 134*  --  134* 136 132* 136 136   POTASSIUM mmol/L 3 0* 4 2  --  4 3 3 8 4 0 3 7 4 0   CHLORIDE mmol/L 106 108  --  108 107 106 109* 106   CO2 mmol/L 25 17*  --  18* 21 22 21 16*   ANION GAP mmol/L 7 9  --  8 8 4 6 14*   BUN mg/dL 22 23  --  24 14 15 20 27*   CREATININE mg/dL 1 17 1 00  --  1 10 1 04 1 08 1 16 0 96   EGFR ml/min/1 73sq m 44 54  --  48 51 49 45 56   CALCIUM mg/dL 7 6* 7 7*  --  7 0* 7 8* 7 5* 7 6* 6 0*   CALCIUM, IONIZED mmol/L 1 15 1 10* 0 96*  --   --   --   --   --    MAGNESIUM mg/dL 1 8 1 8*  --   --   --   --  2 1 2 3   PHOSPHORUS mg/dL 2 9 4 0  --   --   --   --  2 7 2 7     Results from last 7 days   Lab Units 06/17/22  0536 06/13/22  0550 06/12/22  0507 06/11/22  1704   AST U/L 16 17 12* 18   ALT U/L 12 9 7 8   ALK PHOS U/L 38* 42 30* 40   TOTAL PROTEIN g/dL 5 1* 5 4* 4 3* 5 4*   ALBUMIN g/dL 2 0* 2 4* 1 9* 2 3*   TOTAL BILIRUBIN mg/dL 0 56 0 75 1 09* 0 39     Results from last 7 days   Lab Units 06/16/22  2202 06/16/22  0747 06/12/22  1910   POC GLUCOSE mg/dl 115 173* 101     Results from last 7 days   Lab Units 06/17/22  0536 06/16/22  0437 06/15/22  2123 06/15/22  0439 06/14/22  1044 06/13/22  0550 06/12/22  0507 06/11/22  1704   GLUCOSE RANDOM mg/dL 71 143* 136 92 104 86 69 119 No results found for: BETA-HYDROXYBUTYRATE                           Results from last 7 days   Lab Units 06/16/22  0437 06/15/22  2123 06/13/22  0550   PROTIME seconds 16 7* 18 1* 13 7   INR  1 36* 1 51* 1 05   PTT seconds  --  29  --              Results from last 7 days   Lab Units 06/15/22  2344 06/15/22  2107 06/12/22  0919   LACTIC ACID mmol/L 1 8 3 6* 0 5            ED Treatment:   Medication Administration - No Administrations Displayed (No Start Event Found)     None        Past Medical History:   Diagnosis Date    Anemia     Anxiety     Bipolar disorder ([facility] [address] )     Colon polyp     Disease of thyroid gland     Essential hypertension     Essential tremor     Fibromyalgia, primary     GERD (gastroesophageal reflux disease)     Inflammatory polyarthropathy (HCC)     Mammogram abnormal      Present on Admission:   Acute blood loss anemia   Gastric ulcer   Hypocalcemia   Hyponatremia   Neutrophilic leukocytosis   Ambulatory dysfunction   Depression   Hypothyroidism   Severe protein-calorie malnutrition (HCC)      Admitting Diagnosis: Acute blood loss anemia [D62]  Age/Sex: 64 y o  female  Admission Orders:  Continuous cardiopulmonary monitoring   Neuro checks  HGB Q 4hr  Up w assist    Scheduled Medications:  levothyroxine, 112 mcg, Oral, Early Morning  misoprostol, 200 mcg, Oral, 4x Daily (with meals and at bedtime)  potassium chloride, 40 mEq, Oral, BID  sucralfate, 1 g, Oral, 4x Daily (AC & HS)  Continuous IV Infusions:  multi-electrolyte, 75 mL/hr, Intravenous, Continuous  pantoprozole (PROTONIX) infusion (Continuous), 8 mg/hr, Intravenous, Continuous    PRN Meds:  acetaminophen, 650 mg, Oral, Q6H PRN  ondansetron, 4 mg, Intravenous, Q6H PRN    IP CONSULT TO NEUROPSYCHOLOGY  IP CONSULT TO CASE MANAGEMENT  IP CONSULT TO GASTROENTEROLOGY  IP CONSULT TO BARIATRIC SURGERY  Network Utilization Review Department  ATTENTION: Please call with any questions or concerns to [phone] and carefully listen to the prompts so that you are directed to the right person  All voicemails are confidential   Murphy Quintana all requests for admission clinical reviews, approved or denied determinations and any other requests to dedicated fax number below belonging to the campus where the patient is receiving treatment   List of dedicated fax numbers for the Facilities:  [address] DENIALS (Administrative/Medical Necessity) [phone]   [address] (Maternity/NICU/Pediatrics) [phone]   [address] [place] [address]  [address] [place] [address] [id number] [place] [address] [address] [facility] [place] [address] [phone]

## 2022-06-17 NOTE — ASSESSMENT & PLAN NOTE
· Recent admission for symptomatic anemia  EGD during that admission with noted ulcer, no active bleeding  Patient was to have follow up EGD and to continue PPI and Carafate  · Acute GI blood loss anemia, peptic ulcer disease, gastric ulcer, hematemesis, bariatric surgery status   s/p EGD x2 with anastomotic ulcer s/p hemoclip, bicap and epi  · Monitor hemoglobin, transfuse as needed  · Consult bariatric surgery  · Continue PPI drip and carafate

## 2022-06-17 NOTE — CONSULTS
Patient MRN: [id number]  Date of Service: 6/17/2022  Referring Physician: Allyson Renteria MD  Provider Creating Note: Parish Merino PA-C  PCP: Tian Hawthorne  Reason for Consult:  Upper GI bleed from anastomotic ulcer  HPI  Grey Danielle is a 64 y o  female who was admitted with anastomotic ulcer  She has a history of a Savage-en-Y gastric bypass in 2012 with a history of anastomotic ulcer in 2019  EGD 07/2021 showed healing  She was admitted to [address] [place] [address] 5/24/2022 with symptomatic anemia  Her hemoglobin was 7 8 with an MCV of 99  EGD 5/26 showed a large anastomotic ulcer without active bleeding  She was placed on Carafate and b i d  PPI  Her hemoglobin was 8 3 at discharge 05/28  On 06/11 she was admitted to [address] [place] [address] with hematemesis and hemoglobin 4 4  She had EGD 6/12 that showed an anastomotic ulcer which was injected with epinephrine and clipped x2  He she continued with of bloody stools and had repeat EGD 6/15 that showed the anastomotic ulcer with a nonbleeding visible vessel  This was treated with cautery  She was transferred to [place] [address] to be seen by the Bariatric Team     She complains of fatigue and weakness  No abdominal pain  No further vomiting  Her last bowel movement was last night at 9:00 p m , was documented to be red/brown  She remains NPO  Her appetite has been poor prior to admission and she states she has been losing weight recently  However her weight was documented to be 128 lb 12/2021, she is 126 this admission  She reports that she has not been taking her PPI faithfully  She had been on Carafate previously    Past Medical History:   Diagnosis Date    Anemia     Anxiety     Bipolar disorder ([address] )     Colon polyp     Disease of thyroid gland     Essential hypertension     Essential tremor     Fibromyalgia, primary     GERD (gastroesophageal reflux disease)     Inflammatory polyarthropathy (HCC)     Mammogram abnormal Past Surgical History:   Procedure Laterality Date    BREAST BIOPSY Right 2015    benign    CARPAL TUNNEL RELEASE Bilateral     COLONOSCOPY      EGD AND COLONOSCOPY  01/01/2014    GASTRIC BYPASS  07/01/2012    MAMMO NEEDLE LOCALIZATION RIGHT (ALL INC) Right 2/6/2012    MAMMO NEEDLE LOCALIZATION RIGHT (ALL INC) Right 2/6/2012    ULNAR NERVE TRANSPOSITION Right      Medications  Home Medications:   Prior to Admission medications    Medication Sig Start Date End Date Taking?  Authorizing Provider   clonazePAM (KlonoPIN) 1 mg tablet TK 3 TS PO Q NIGHT 6/19/19  Yes Historical Provider, MD   levothyroxine 112 mcg tablet TK 1 T PO QD 8/27/19  Yes Historical Provider, MD   traZODone (DESYREL) 50 mg tablet Take 150 mg by mouth daily at bedtime 7/22/19  Yes Historical Provider, MD   pantoprazole (PROTONIX) 40 mg tablet Take 1 tablet (40 mg total) by mouth 2 (two) times a day before meals  Patient not taking: Reported on 6/16/2022 5/28/22 6/27/22  Nayeli Wynne MD   sucralfate (CARAFATE) 1 g tablet Take 1 tablet (1 g total) by mouth 4 (four) times a day (before meals and at bedtime)  Patient not taking: Reported on 6/16/2022 5/28/22 6/27/22  Nayeli Wynne MD       Inhouse Medications    Current Facility-Administered Medications:     acetaminophen (TYLENOL) tablet 650 mg, 650 mg, Oral, Q6H PRN    levothyroxine tablet 112 mcg, 112 mcg, Oral, Early Morning    misoprostol (CYTOTEC) tablet 200 mcg, 200 mcg, Oral, 4x Daily (with meals and at bedtime), 200 mcg at 06/17/22 0919    multi-electrolyte (PLASMALYTE-A/ISOLYTE-S PH 7 4) IV solution, 75 mL/hr, Intravenous, Continuous, 75 mL/hr at 06/16/22 2203    ondansetron (ZOFRAN) injection 4 mg, 4 mg, Intravenous, Q6H PRN    pantoprazole (PROTONIX) 80 mg in sodium chloride 0 9 % 100 mL infusion, 8 mg/hr, Intravenous, Continuous, 8 mg/hr at 06/17/22 0814    sucralfate (CARAFATE) tablet 1 g, 1 g, Oral, 4x Daily (AC & HS), 1 g at 06/17/22 0919    Allergies  No Known Allergies  Social History   reports that she quit smoking about 36 years ago  She has never used smokeless tobacco  She reports current alcohol use of about 4 0 standard drinks of alcohol per week  She reports that she does not use drugs  Family History  Family History   Problem Relation Age of Onset    No Known Problems Mother     No Known Problems Father     No Known Problems Sister     No Known Problems Maternal Grandmother     No Known Problems Maternal Grandfather     No Known Problems Paternal Grandmother     No Known Problems Paternal Grandfather     No Known Problems Sister     No Known Problems Sister     Stomach cancer Maternal Aunt     No Known Problems Maternal Aunt     No Known Problems Maternal Aunt     No Known Problems Maternal Aunt     No Known Problems Paternal Aunt     Leukemia Other      ROS  ROS: Reports:  Fatigue, weight loss  Denies abdominal pain, dizziness, chest pain  All others negative except as noted in HPI  Objective   Vitals  BP (!) 93/44   Pulse 68   Temp 98 5 °F (36 9 °C)   Resp (!) 24   Ht 5' 4" (1 626 m)   Wt 57 4 kg (126 lb 8 7 oz)   SpO2 100%   BMI 21 72 kg/m²   General: Alert, cachectic, fatigued  Eyes:  No scleral icterus  ENT:  Mucous membranes moist  Card:  Regular rhythm with II/VI murmur  Lungs: Clear to ascultation b/l  No wheezes, rales, rhonchi  Abdomen:  Soft  Nontender    Bowel sounds normoactive  Skin:  No jaundice  Extremities:  No edema  Neuro: Alert and oriented x3    Laboratory Studies  Lab Results   Component Value Date    WBC 10 02 06/17/2022    HGB 7 0 (L) 06/17/2022    HCT 20 8 (L) 06/17/2022     06/17/2022    MCV 90 06/17/2022     Lab Results   Component Value Date    HGB 7 0 (L) 06/17/2022    HGB 7 6 (L) 06/16/2022    HGB 7 4 (L) 06/16/2022       Lab Results   Component Value Date    CREATININE 1 17 06/17/2022    BUN 22 06/17/2022    SODIUM 138 06/17/2022    K 3 0 (L) 06/17/2022     06/17/2022    CO2 25 06/17/2022 GLUC 71 06/17/2022    CALCIUM 7 6 (L) 06/17/2022    ALKPHOS 38 (L) 06/17/2022    ALB 2 0 (L) 06/17/2022    TBILI 0 56 06/17/2022    AST 16 06/17/2022    ALT 12 06/17/2022     Lab Results   Component Value Date    PROTIME 16 7 (H) 06/16/2022    INR 1 36 (H) 06/16/2022         Assessment and Plan:    1  Upper GI bleed secondary to anastomotic ulcer  Hemoglobin relatively stable, for transfusion today  Continue to follow  Dr Venkatesh Kang note from yesterday appreciated, patient to IR or surgery if rebleeding occurs      Active Problems:    Hypothyroidism    Depression    Hyponatremia    H/O bariatric surgery - bypass    Gastric ulcer    Acute blood loss anemia    Ambulatory dysfunction    Neutrophilic leukocytosis    Hypocalcemia    Severe protein-calorie malnutrition (HCC)    Bilateral leg edema      Jessica Merino PA-C

## 2022-06-17 NOTE — ASSESSMENT & PLAN NOTE
Lab Results   Component Value Date    [id number] 3 492 05/24/2022      Synthroid is held, resume after cleared for PO by GI

## 2022-06-17 NOTE — ASSESSMENT & PLAN NOTE
· Admitted on 6/11 with acute blood loss anemia with an Hgb of 4 4 in ED given 2 units pRBC's  · Recent admission (05/24) for symptomatic anemia and discharged with Hgb of 8 3     · Hx of Gastric ulcers, see below  · Currently not on any AC/AP at home  · Started on IV Protonix and consulted GI  · EGD on 6/12 which showed a single large, deep, irregular, benign-appearing ulcer in the gastrojejunal anastomosis with adherent clot  Placed 2 clips, injected epinephrine, hemostasis achieved  · Emergent EGD on 6/15 showed Single large, deep, irregular ulcer in the gastrojejunal anastomosis with nonbleeding visible vessel  Induced coagulation and hemostasis achieved with 2 applications of bipolar cautery, epinephrine injection; hemostasis achieved    · Total products: 6 units PRBC's  · Surgery was consulted and recommended Misoprostol 200mg PO AC/HS  PLAN  · Continue to trend H/H q4h  · Transfuse for Hgb < 7 or active bleeding  · Continue Isolyte @ 75cc/hr  · Continue protonix drip  · Bariatric surgery following  · Continue NPO  · Zofran PRN for N/V  · GI following  · Follow up on biopsies

## 2022-06-17 NOTE — ASSESSMENT & PLAN NOTE
· Admitted on 6/11 with acute blood loss anemia with an Hgb of 4 4 in ED given 2 units pRBC's  · Recent admission (05/24) for symptomatic anemia and discharged with Hgb of 8 3     · Hx of Gastric ulcers, see below  · Currently not on any AC/AP at home  · Started on IV Protonix and consulted GI  · EGD on 6/12 which showed a single large, deep, irregular, benign-appearing ulcer in the gastrojejunal anastomosis with adherent clot  Placed 2 clips, injected epinephrine, hemostasis achieved  · Emergent EGD on 6/15 showed Single large, deep, irregular ulcer in the gastrojejunal anastomosis with nonbleeding visible vessel  Induced coagulation and hemostasis achieved with 2 applications of bipolar cautery, epinephrine injection; hemostasis achieved    · Received 1L NSS and 250mL Albumin and 2 units PRBC's  · Given 80mg IV Protonix and started on Protonix gtt @ 8mg/hr  · Total products: 6 units PRBC's  · Surgery was consulted and recommended Misoprostol 200mg PO AC/HS, will hold for now   PLAN  · Continue to trend H/H q4h  · Transfuse for Hgb < 7 or active bleeding  · Continue Isolyte @ 125cc/hr  · Consult bariatric surgery  · Continue NPO  · Zofran PRN for N/V  · GI following  · Follow up on biopsies

## 2022-06-17 NOTE — H&P
[address] [facility] 1945, 64 y o  female MRN: [id number]  Unit/Bed#: ICU 11 Encounter: [id number]  Primary Care Provider: Rocio Chin MD   Date and time admitted to hospital: 6/16/2022  9:16 PM    Acute blood loss anemia  Assessment & Plan  · Admitted on 6/11 with acute blood loss anemia with an Hgb of 4 4 in ED given 2 units pRBC's  · Recent admission (05/24) for symptomatic anemia and discharged with Hgb of 8 3     · Hx of Gastric ulcers, see below  · Currently not on any AC/AP at home  · Started on IV Protonix and consulted GI  · EGD on 6/12 which showed a single large, deep, irregular, benign-appearing ulcer in the gastrojejunal anastomosis with adherent clot  Placed 2 clips, injected epinephrine, hemostasis achieved  · Emergent EGD on 6/15 showed Single large, deep, irregular ulcer in the gastrojejunal anastomosis with nonbleeding visible vessel  Induced coagulation and hemostasis achieved with 2 applications of bipolar cautery, epinephrine injection; hemostasis achieved  · Total products: 6 units PRBC's  · Surgery was consulted and recommended Misoprostol 200mg PO AC/HS  PLAN  · Continue to trend H/H q4h  · Transfuse for Hgb < 7 or active bleeding  · Continue Isolyte @ 75cc/hr  · Continue protonix drip  · Consult bariatric surgery  · Continue NPO  · Zofran PRN for N/V  · GI following  · Follow up on biopsies      Gastric ulcer  Assessment & Plan  · Recent admission for symptomatic anemia  EGD during that admission with noted ulcer, no active bleeding  Patient was to have follow up EGD and to continue PPI and Carafate  · Again admitted for acute GI blood loss anemia, likely secondary to peptic ulcer disease with bariatric surgery status   s/p EGD x2 with anastomotic ulcer s/p hemoclip, bicap and epi  · Monitor hemoglobin, transfuse as needed  · Consult bariatric surgery  · Continue PPI drip and carafate       Bilateral leg edema  Assessment & Plan  · Bilateral leg edema up to ankle noted on admission  · Denied chronic swelling of her legs  · She has received multiple IVF and blood transfusion since admission  · Likely due to third spacing and physical decondition  · Will check LE U/S to rule out DVTs    Severe protein-calorie malnutrition (HCC)  Assessment & Plan  BMI 21 49  · Consult nutrition and utritional supplements  when able to have PO intake    ·     Hypocalcemia  Assessment & Plan  · Likely d/t blood transfusions  · Asymptomatic  · Repleted with 2g calcium gluconate  · Recheck ionized Ca     Neutrophilic leukocytosis  Assessment & Plan  · No clear signs of infection  · Likely reactive  · Monitor CBC and fever curve        Ambulatory dysfunction  Assessment & Plan  · Likely related to malnutrition and deconditioning  · PT OT eval and treat  · Likely post acute rehab after discharge  ·       H/O bariatric surgery - bypass  Assessment & Plan  · Patient reports remote history of Savage-en-Y gastric bypass approximately 12 years ago, at [facility]   · Will consult bariatric surgery during this admission    Hyponatremia  Assessment & Plan  Recent Labs     06/15/22  0439 06/15/22  2123 06/16/22  0437   SODIUM 136 134* 134*     · Likely due to NPO status   · Continue IVF  · Monitor BMP    Depression  Assessment & Plan  · Hold home Klonopin and Trazodone  · Seen by Neuropsych, deemed patient to not have capacity to make decisions  · Delirium precautions   · CAM-ICU       Hypothyroidism  Assessment & Plan  Lab Results   Component Value Date    [id number] 3 492 05/24/2022      Synthroid is held, resume after cleared for PO by GI    -------------------------------------------------------------------------------------------------------------  Chief Complaint: hematemesis    History of Present Illness   HX and PE limited by: none  Kelly Holloway is a 64 y o  female with PMH of Savage-en-Y gastric bypass, depression, gastric ulcer, hypothyroidism, who presents with hematemesis and bloody bowel movement  She was previously admitted to [address] [place] [address] for hematemesis and bright red blood per rectum  In the ED, her hemoglobin was noted to be 4 4  She was immediately had blood transfusion started with Protonix boluses and admitted to ICU step-down level 1  GI was consulted during that admission and she had EGD done June 12th showed a large deep ulcer in the gastrojejunal anastomosis with adherent clot status post clips and epinephrine injection  She was then transferred to [facility] after EGD  However, last night, she had another bloody bowel movement and she was noted to be pale and fatigue  Hypotension was noted and Hb was 6 2  She received 1 unit of PRBC and 1L of normal saline, and albumin  GI was notified  Emergent EGD was then done and showed a single large, deep, irregular ulcer in the gastrojejunal anastomosis with nonbleeding visible vessel  She was transferred to ICU for close monitoring and then decided to transfer to [facility] to be evaluated by Bariatric surgery  Upon evaluation, she did report lightheadedness, epigastric pain and nausea  She stated she has not had bowel movement since last night  She stated she has significant weight loss for the past couple of years due to poor appetite  She will be admitted for further management of recurrent GI bleeding  History obtained from chart review and the patient   -------------------------------------------------------------------------------------------------------------  Dispo: Admit to Stepdown Level 1    Code Status: Level 1 - Full Code  --------------------------------------------------------------------------------------------------------------  Review of Systems   Constitutional: Positive for fatigue  Negative for chills and fever  HENT: Negative for ear pain and sore throat  Eyes: Negative for pain and visual disturbance  Respiratory: Negative for cough and shortness of breath  Cardiovascular: Negative for chest pain and palpitations  Gastrointestinal: Positive for abdominal pain, blood in stool, diarrhea and nausea  Negative for vomiting  Genitourinary: Negative for dysuria and hematuria  Musculoskeletal: Negative for arthralgias and back pain  Skin: Negative for color change and rash  Neurological: Positive for light-headedness  Negative for seizures and syncope  All other systems reviewed and are negative  A 12-point, complete review of systems was reviewed and negative except as stated above     Physical Exam  Vitals and nursing note reviewed  Constitutional:       General: She is not in acute distress  Appearance: She is well-developed  She is ill-appearing  HENT:      Head: Normocephalic and atraumatic  Eyes:      Conjunctiva/sclera: Conjunctivae normal    Cardiovascular:      Rate and Rhythm: Normal rate and regular rhythm  Heart sounds: No murmur heard  Pulmonary:      Effort: Pulmonary effort is normal  No respiratory distress  Breath sounds: Normal breath sounds  Abdominal:      Palpations: Abdomen is soft  Tenderness: There is abdominal tenderness  There is no guarding  Musculoskeletal:      Cervical back: Neck supple  Right lower leg: Edema present  Left lower leg: Edema present  Skin:     General: Skin is warm and dry  Neurological:      Mental Status: She is alert  Psychiatric:         Behavior: Behavior normal        --------------------------------------------------------------------------------------------------------------  Vitals: There were no vitals filed for this visit  Temp  Min: 97 °F (36 1 °C)  Max: 100 4 °F (38 °C)        There is no height or weight on file to calculate BMI      Laboratory and Diagnostics:  Results from last 7 days   Lab Units 06/16/22  1620 06/16/22  1321 06/16/22  0437 06/15/22  2344 06/15/22  1946 06/15/22  1300 06/15/22  0439 06/14/22  1044 06/13/22  0550 06/12/22  1339 06/12/22  0507 06/12/22  0102 06/11/22  1704   WBC Thousand/uL  --   --  11 46* 12 02*  --   --  13 54* 10 27* 12 71*  --  10 24*  --  13 08*   HEMOGLOBIN g/dL 7 4* 7 6* 9 0* 8 4* 6 2* 9 9* 9 9* 10 1* 9 9*   < > 5 9*   < > 4 4*   HEMATOCRIT % 21 3* 23 4* 25 5* 24 8* 18 9* 30 2* 29 6* 30 2* 28 8*   < > 17 2*   < > 14 9*   PLATELETS Thousands/uL  --   --  341 375  --   --  608* 535* 407*  --  460*  --  635*   NEUTROS PCT %  --   --  78*  --   --   --  69 66 69  --  57  --   --    BANDS PCT %  --   --   --   --   --   --   --   --   --   --   --   --  3   MONOS PCT %  --   --  3*  --   --   --  9 11 10  --  9  --   --    MONO PCT %  --   --   --   --   --   --   --   --   --   --   --   --  5    < > = values in this interval not displayed       Results from last 7 days   Lab Units 06/16/22  0437 06/15/22  2123 06/15/22  0439 06/14/22  1044 06/13/22  0550 06/12/22  0507 06/11/22  1704   SODIUM mmol/L 134* 134* 136 132* 136 136 133*   POTASSIUM mmol/L 4 2 4 3 3 8 4 0 3 7 4 0 4 4   CHLORIDE mmol/L 108 108 107 106 109* 106 105   CO2 mmol/L 17* 18* 21 22 21 16* 18*   ANION GAP mmol/L 9 8 8 4 6 14* 10   BUN mg/dL 23 24 14 15 20 27* 25   CREATININE mg/dL 1 00 1 10 1 04 1 08 1 16 0 96 1 39*   CALCIUM mg/dL 7 7* 7 0* 7 8* 7 5* 7 6* 6 0* 7 4*   GLUCOSE RANDOM mg/dL 143* 136 92 104 86 69 119   ALT U/L  --   --   --   --  9 7 8   AST U/L  --   --   --   --  17 12* 18   ALK PHOS U/L  --   --   --   --  42 30* 40   ALBUMIN g/dL  --   --   --   --  2 4* 1 9* 2 3*   TOTAL BILIRUBIN mg/dL  --   --   --   --  0 75 1 09* 0 39     Results from last 7 days   Lab Units 06/16/22  0437 06/13/22  0550 06/12/22  0507   MAGNESIUM mg/dL 1 8* 2 1 2 3   PHOSPHORUS mg/dL 4 0 2 7 2 7      Results from last 7 days   Lab Units 06/16/22  0437 06/15/22  2123 06/13/22  0550 06/12/22  1752 06/12/22  0507 06/11/22  1704   INR  1 36* 1 51* 1 05 1 12 1 48* 1 40*   PTT seconds  --  29  --   --   --   --           Results from last 7 days   Lab Units 06/15/22  2344 06/15/22  2107 22  0919   LACTIC ACID mmol/L 1 8 3 6* 0 5     ABG:    VBG:          Micro:        EKG: NSR  Imaging: I have personally reviewed pertinent reports          Historical Information   Past Medical History:   Diagnosis Date    Anemia     Anxiety     Bipolar disorder ([address] )     Colon polyp     Disease of thyroid gland     Essential hypertension     Essential tremor     Fibromyalgia, primary     GERD (gastroesophageal reflux disease)     Inflammatory polyarthropathy (HCC)     Mammogram abnormal      Past Surgical History:   Procedure Laterality Date    BREAST BIOPSY Right 2015    benign    CARPAL TUNNEL RELEASE Bilateral     COLONOSCOPY      EGD AND COLONOSCOPY  2014    GASTRIC BYPASS  2012    MAMMO NEEDLE LOCALIZATION RIGHT (ALL INC) Right 2012    MAMMO NEEDLE LOCALIZATION RIGHT (ALL INC) Right 2012    ULNAR NERVE TRANSPOSITION Right      Social History   Social History     Substance and Sexual Activity   Alcohol Use Yes    Alcohol/week: 4 0 standard drinks    Types: 4 Glasses of wine per week    Comment: rare     Social History     Substance and Sexual Activity   Drug Use Never     Social History     Tobacco Use   Smoking Status Former Smoker    Quit date:     Years since quittin 4   Smokeless Tobacco Never Used       Family History:   Family History   Problem Relation Age of Onset    No Known Problems Mother     No Known Problems Father     No Known Problems Sister     No Known Problems Maternal Grandmother     No Known Problems Maternal Grandfather     No Known Problems Paternal Grandmother     No Known Problems Paternal Grandfather     No Known Problems Sister     No Known Problems Sister     Stomach cancer Maternal Aunt     No Known Problems Maternal Aunt     No Known Problems Maternal Aunt     No Known Problems Maternal Aunt     No Known Problems Paternal Aunt     Leukemia Other      I have reviewed this patient's family history and commented on sigificant items within the HPI      Medications:  Current Facility-Administered Medications   Medication Dose Route Frequency    acetaminophen (TYLENOL) tablet 650 mg  650 mg Oral Q6H PRN    misoprostol (CYTOTEC) tablet 200 mcg  200 mcg Oral 4x Daily (with meals and at bedtime)    multi-electrolyte (PLASMALYTE-A/ISOLYTE-S PH 7 4) IV solution  75 mL/hr Intravenous Continuous    ondansetron (ZOFRAN) injection 4 mg  4 mg Intravenous Q6H PRN    pantoprazole (PROTONIX) 80 mg in sodium chloride 0 9 % 100 mL infusion  8 mg/hr Intravenous Continuous    sucralfate (CARAFATE) tablet 1 g  1 g Oral 4x Daily (AC & HS)     Home medications:  Prior to Admission Medications   Prescriptions Last Dose Informant Patient Reported? Taking?    clonazePAM (KlonoPIN) 1 mg tablet Past Week at Unknown time Self Yes Yes   Sig: TK 3 TS PO Q NIGHT   levothyroxine 112 mcg tablet Past Week at Unknown time Self Yes Yes   Sig: TK 1 T PO QD   pantoprazole (PROTONIX) 40 mg tablet Not Taking at Unknown time  No No   Sig: Take 1 tablet (40 mg total) by mouth 2 (two) times a day before meals   Patient not taking: Reported on 6/16/2022   sucralfate (CARAFATE) 1 g tablet Not Taking at Unknown time  No No   Sig: Take 1 tablet (1 g total) by mouth 4 (four) times a day (before meals and at bedtime)   Patient not taking: Reported on 6/16/2022   traZODone (DESYREL) 50 mg tablet Past Week at Unknown time Self Yes Yes   Sig: Take 150 mg by mouth daily at bedtime      Facility-Administered Medications: None     Allergies:  No Known Allergies    ------------------------------------------------------------------------------------------------------------  Advance Directive and Living Will:      Power of :    POLST:    ------------------------------------------------------------------------------------------------------------  Anticipated Length of Stay is > 2 midnights    Care Time Delivered:   Upon my evaluation, this patient had a high probability of imminent or life-threatening deterioration due to GI bleeding, which required my direct attention, intervention, and personal management  I have personally provided 45 minutes (30 to 45) of critical care time, exclusive of procedures, teaching, family meetings, and any prior time recorded by providers other than myself  Annie Marr MD        Tobacco and Toxic Substance Assessment and Intervention:     Tobacco use screening performed    Alcohol and drug use screening performed        Portions of the record may have been created with voice recognition software  Occasional wrong word or "sound a like" substitutions may have occurred due to the inherent limitations of voice recognition software    Read the chart carefully and recognize, using context, where substitutions have occurred

## 2022-06-17 NOTE — ASSESSMENT & PLAN NOTE
Recent Labs     06/15/22  0439 06/15/22  2123 06/16/22  0437   SODIUM 136 134* 134*   ·   · Likely due to NPO status   · Continue IVF  · Monitor BMP

## 2022-06-17 NOTE — ASSESSMENT & PLAN NOTE
· Likely related to malnutrition and deconditioning  · PT OT eval and treat  · Likely post acute rehab after discharge

## 2022-06-17 NOTE — ASSESSMENT & PLAN NOTE
· Likely related to malnutrition and deconditioning  · PT OT eval and treat  · Likely post acute rehab after discharge  ·

## 2022-06-17 NOTE — ASSESSMENT & PLAN NOTE
· Patient reports remote history of Savage-en-Y gastric bypass approximately 12 years ago, at [facility]   · Will consult bariatric surgery during this admission  · Seen today by bariatric surgery fellow, they will follow patient for now

## 2022-06-17 NOTE — CONSULTS
Consultation - Bariatric Surgery   Keke Schreiber 64 y o  female MRN: [id number]  Unit/Bed#: ICU 11 Encounter: [id number]    Assessment/Plan     Assessment:  65 yo female with hx of RNYGB at [facility] in 2012 with hx of known marginal ulcers who presented to [address] [place] [address] with a bleeding marginal ulcer    Plan:  1  Continue IV protonix drip  2  Continue to trend H/H q4hr  3  Continue NPO  4  Zofran PRN for nausea/vomiting  5  Transfuse for Hgb < 7   6  Follow up GI recs  7  Appreciate care per ICU team  8  If patient rebleeds will contact GI and IR for possible interventions before taking the patient to the OR    History of Present Illness     HPI:  Keke Schreiber is a 64 y o  female Body mass index is 21 72 kg/m²  with history of RNYGB at [facility] in 2012 who presented to [address] [place] [address] ER on 6/11/22 with UGIB  She has undergone two EGDs with GI on 6/12 and 6/15 that showed a large, deep, irregular ulcer at the [address] [facility] anastomosis with nonbleeding visible vessel  Induced coagulation and hemostasis achieved with 2 applications of bipolar cautery, epinephrine injection  Since admission she has received 6 U pRBCs    Patient had melanotic stools over the past several days, however, this morning she notes that her most recent BM was normal without blood  She denies hematemesis  She denies nausea/vomiting or abdominal pain  No chest pain or shortness of breath  She was transferred to [address] [address] on 6/16/22 for bariatric surgery evaluation     Consults    Review of Systems   Constitutional: Negative for chills and fever  HENT: Negative for ear pain and sore throat  Eyes: Negative for pain and visual disturbance  Respiratory: Negative for cough and shortness of breath  Cardiovascular: Negative for chest pain and palpitations  Gastrointestinal: Negative for abdominal pain and vomiting  Genitourinary: Negative for dysuria and hematuria     Musculoskeletal: Negative for arthralgias and back pain  Skin: Negative for color change and rash  Neurological: Negative for dizziness, seizures and syncope  All other systems reviewed and are negative        Historical Information   Past Medical History:   Diagnosis Date    Anemia     Anxiety     Bipolar disorder ([address] )     Colon polyp     Disease of thyroid gland     Essential hypertension     Essential tremor     Fibromyalgia, primary     GERD (gastroesophageal reflux disease)     Inflammatory polyarthropathy (HCC)     Mammogram abnormal      Past Surgical History:   Procedure Laterality Date    BREAST BIOPSY Right 2015    benign    CARPAL TUNNEL RELEASE Bilateral     COLONOSCOPY      EGD AND COLONOSCOPY  2014    GASTRIC BYPASS  2012    MAMMO NEEDLE LOCALIZATION RIGHT (ALL INC) Right 2012    MAMMO NEEDLE LOCALIZATION RIGHT (ALL INC) Right 2012    ULNAR NERVE TRANSPOSITION Right      Social History   Social History     Substance and Sexual Activity   Alcohol Use Yes    Alcohol/week: 4 0 standard drinks    Types: 4 Glasses of wine per week    Comment: rare     Social History     Substance and Sexual Activity   Drug Use Never     Social History     Tobacco Use   Smoking Status Former Smoker    Quit date: 12    Years since quittin 4   Smokeless Tobacco Never Used     Family History: Non-contributory      Meds/Allergies   No Known Allergies    Objective   First Vitals:   Blood Pressure: 105/65 (22)  Pulse: 84 (22)  Temperature: (!) 97 2 °F (36 2 °C) (22)  Temp Source: Temporal (22)  Respirations: 20 (22)  Height: 5' 4" (162 6 cm) (22)  Weight - Scale: 57 4 kg (126 lb 8 7 oz) (22)  SpO2: 99 % (22)    Current Vitals:   Blood Pressure: (!) 93/44 (22 0715)  Pulse: 68 (22 0715)  Temperature: 98 8 °F (37 1 °C) (22 1050)  Temp Source: Temporal (22)  Respirations: (!) 24 (22 0715)  Height: 5' 4" (162 6 cm) (06/16/22 2130)  Weight - Scale: 57 4 kg (126 lb 8 7 oz) (06/16/22 2130)  SpO2: 100 % (06/17/22 0715)      Intake/Output Summary (Last 24 hours) at 6/17/2022 1307  Last data filed at 6/17/2022 1247  Gross per 24 hour   Intake 523 59 ml   Output 500 ml   Net 23 59 ml       Invasive Devices  Report    Peripheral Intravenous Line  Duration           Peripheral IV 06/15/22 Distal;Left;Ventral (anterior) Forearm 2 days    Peripheral IV 06/16/22 Left;Proximal;Ventral (anterior) Forearm 1 day                Physical Exam  Constitutional:       Appearance: Normal appearance  She is normal weight  Comments: Frail appearing    HENT:      Head: Normocephalic and atraumatic  Cardiovascular:      Rate and Rhythm: Normal rate  Pulmonary:      Effort: Pulmonary effort is normal    Abdominal:      General: Abdomen is flat  There is no distension  Palpations: Abdomen is soft  There is no mass  Tenderness: There is no guarding or rebound  Musculoskeletal:         General: Normal range of motion  Skin:     General: Skin is warm and dry  Neurological:      Mental Status: She is alert  Psychiatric:         Mood and Affect: Mood normal          Behavior: Behavior normal        Counseling / Coordination of Care  Total floor / unit time spent today 20 minutes  Greater than 50% of total time was spent with the patient and / or family counseling and / or coordination of care       Plan discussed with Dr Mac Pressley MD

## 2022-06-17 NOTE — ASSESSMENT & PLAN NOTE
Lab Results   Component Value Date    [id number] 3 492 05/24/2022     ·  Patient taking meds with sips

## 2022-06-17 NOTE — OCCUPATIONAL THERAPY NOTE
OCCUPATIONAL THERAPY CANCELLATION     OT orders received and chart reviewed  Pt refused OT evaluation at this time stating she needed to rest  Educated pt on importance of moving but pt continued to decline  Will f/u and see as able and appropriate       Analia [place] MS, OTR/L

## 2022-06-18 LAB
ANION GAP SERPL CALCULATED.3IONS-SCNC: 9 MMOL/L (ref 4–13)
BUN SERPL-MCNC: 17 MG/DL (ref 5–25)
CALCIUM SERPL-MCNC: 7.5 MG/DL (ref 8.3–10.1)
CHLORIDE SERPL-SCNC: 103 MMOL/L (ref 100–108)
CO2 SERPL-SCNC: 22 MMOL/L (ref 21–32)
CREAT SERPL-MCNC: 0.98 MG/DL (ref 0.6–1.3)
GFR SERPL CREATININE-BSD FRML MDRD: 55 ML/MIN/1.73SQ M
GLUCOSE SERPL-MCNC: 71 MG/DL (ref 65–140)
HCT VFR BLD AUTO: 21.5 % (ref 34.8–46.1)
HCT VFR BLD AUTO: 22 % (ref 34.8–46.1)
HCT VFR BLD AUTO: 24.7 % (ref 34.8–46.1)
HGB BLD-MCNC: 7.2 G/DL (ref 11.5–15.4)
HGB BLD-MCNC: 7.3 G/DL (ref 11.5–15.4)
HGB BLD-MCNC: 8.1 G/DL (ref 11.5–15.4)
MAGNESIUM SERPL-MCNC: 1.7 MG/DL (ref 1.6–2.6)
PHOSPHATE SERPL-MCNC: 2.6 MG/DL (ref 2.3–4.1)
POTASSIUM SERPL-SCNC: 3.9 MMOL/L (ref 3.5–5.3)
SODIUM SERPL-SCNC: 134 MMOL/L (ref 136–145)

## 2022-06-18 PROCEDURE — 80048 BASIC METABOLIC PNL TOTAL CA: CPT | Performed by: NURSE PRACTITIONER

## 2022-06-18 PROCEDURE — 99231 SBSQ HOSP IP/OBS SF/LOW 25: CPT | Performed by: INTERNAL MEDICINE

## 2022-06-18 PROCEDURE — 85014 HEMATOCRIT: CPT | Performed by: NURSE PRACTITIONER

## 2022-06-18 PROCEDURE — C9113 INJ PANTOPRAZOLE SODIUM, VIA: HCPCS | Performed by: PHYSICIAN ASSISTANT

## 2022-06-18 PROCEDURE — 83735 ASSAY OF MAGNESIUM: CPT | Performed by: NURSE PRACTITIONER

## 2022-06-18 PROCEDURE — 99232 SBSQ HOSP IP/OBS MODERATE 35: CPT | Performed by: INTERNAL MEDICINE

## 2022-06-18 PROCEDURE — 84100 ASSAY OF PHOSPHORUS: CPT | Performed by: NURSE PRACTITIONER

## 2022-06-18 PROCEDURE — 97166 OT EVAL MOD COMPLEX 45 MIN: CPT

## 2022-06-18 PROCEDURE — 85018 HEMOGLOBIN: CPT | Performed by: NURSE PRACTITIONER

## 2022-06-18 PROCEDURE — 99232 SBSQ HOSP IP/OBS MODERATE 35: CPT | Performed by: STUDENT IN AN ORGANIZED HEALTH CARE EDUCATION/TRAINING PROGRAM

## 2022-06-18 PROCEDURE — C9113 INJ PANTOPRAZOLE SODIUM, VIA: HCPCS

## 2022-06-18 PROCEDURE — 97163 PT EVAL HIGH COMPLEX 45 MIN: CPT | Performed by: PHYSICAL THERAPIST

## 2022-06-18 RX ORDER — ONDANSETRON 2 MG/ML
4 INJECTION INTRAMUSCULAR; INTRAVENOUS EVERY 4 HOURS PRN
Status: DISCONTINUED | OUTPATIENT
Start: 2022-06-18 | End: 2022-06-28 | Stop reason: HOSPADM

## 2022-06-18 RX ADMIN — MISOPROSTOL 200 MCG: 200 TABLET ORAL at 16:01

## 2022-06-18 RX ADMIN — SODIUM CHLORIDE 8 MG/HR: 9 INJECTION, SOLUTION INTRAVENOUS at 18:15

## 2022-06-18 RX ADMIN — MISOPROSTOL 200 MCG: 200 TABLET ORAL at 12:14

## 2022-06-18 RX ADMIN — SUCRALFATE 1 G: 1 TABLET ORAL at 21:41

## 2022-06-18 RX ADMIN — POTASSIUM CHLORIDE 40 MEQ: 20 TABLET, EXTENDED RELEASE ORAL at 18:13

## 2022-06-18 RX ADMIN — MISOPROSTOL 200 MCG: 200 TABLET ORAL at 21:40

## 2022-06-18 RX ADMIN — POTASSIUM CHLORIDE 40 MEQ: 20 TABLET, EXTENDED RELEASE ORAL at 09:59

## 2022-06-18 RX ADMIN — LEVOTHYROXINE SODIUM 112 MCG: 112 TABLET ORAL at 06:18

## 2022-06-18 RX ADMIN — SUCRALFATE 1 G: 1 TABLET ORAL at 12:14

## 2022-06-18 RX ADMIN — SODIUM CHLORIDE 8 MG/HR: 9 INJECTION, SOLUTION INTRAVENOUS at 06:18

## 2022-06-18 RX ADMIN — SUCRALFATE 1 G: 1 TABLET ORAL at 09:58

## 2022-06-18 RX ADMIN — ACETAMINOPHEN 325MG 650 MG: 325 TABLET ORAL at 19:13

## 2022-06-18 RX ADMIN — CLONAZEPAM 1 MG: 1 TABLET ORAL at 21:41

## 2022-06-18 RX ADMIN — MISOPROSTOL 200 MCG: 200 TABLET ORAL at 09:58

## 2022-06-18 RX ADMIN — SUCRALFATE 1 G: 1 TABLET ORAL at 16:01

## 2022-06-18 NOTE — ASSESSMENT & PLAN NOTE
· Bilateral leg edema up to ankle noted on admission  · Denied chronic swelling of her legs  · She has received multiple IVF and blood transfusion since admission  · Likely due to low protein, third spacing and physical decondition  · Lower extremity ultrasound negative for DVT

## 2022-06-18 NOTE — PLAN OF CARE
Problem: PHYSICAL THERAPY ADULT  Goal: Performs mobility at highest level of function for planned discharge setting  See evaluation for individualized goals  Description: Treatment/Interventions: Functional transfer training, LE strengthening/ROM, Elevations, Therapeutic exercise, Endurance training, Cognitive reorientation, Patient/family training, Equipment eval/education, Gait training, Spoke to nursing, OT  Equipment Recommended:  (tbd)       See flowsheet documentation for full assessment, interventions and recommendations  Note: Prognosis: Good  Problem List: Decreased endurance, Impaired balance, Decreased cognition, Impaired judgement, Decreased safety awareness  Assessment: pt admitted with low hgb, thought ot be related to GI bleeding  pt transferred here from [facility], pt referred to PT  pt was living alone in 2 story home with occassional assist from brother  pt does not drive or use assistive device  pt demonstrated mild functional limitations on eval due to decreased cognition, poor safety awareness, impulsivity and deficits in balance, gait stability, strength and GI function  pt  was able to amb short distance in room with miin assist, no assistive device  pt noted to have relatively low BP on arrival but increased to 132/61 after amb  heart rate wnl and spO2 98% on room air  pt will need skilled PT for safety training, gait and balance  currently recommend rehab but may improve gait rapidly  Barriers to Discharge: Inaccessible home environment, Decreased caregiver support        PT Discharge Recommendation: Post acute rehabilitation services          See flowsheet documentation for full assessment

## 2022-06-18 NOTE — PROGRESS NOTES
Patient Name: Tien Myers  Patient MRN: [id number]  Date: 06/18/22  Service: [facility]    Subjective   No complaints of abdominal pain, thirsty, discussed with ICU and nursing and no further bleeding presently  Hemoglobin stable    Vitals  Blood pressure 113/51, pulse 80, temperature 98 3 °F (36 8 °C), temperature source Temporal, resp  rate 21, height 5' 4" (1 626 m), weight 57 4 kg (126 lb 8 7 oz), SpO2 96 %  Heart rhythm rate regular  Abdomen benign nontender    Laboratory Studies  Results from last 7 days   Lab Units 06/18/22  0624 06/18/22  0037 06/17/22  1816 06/17/22  1125 06/17/22  0536 06/16/22  2248 06/16/22  1620 06/16/22  1321 06/16/22  0437 06/15/22  2344 06/15/22  2123 06/15/22  1300 06/15/22  0439 06/14/22  1044 06/13/22  0550 06/12/22  2026 06/12/22  1752 06/12/22  1339 06/12/22  0507 06/12/22  0102 06/11/22  1704   WBC Thousand/uL  --   --   --   --  10 02  --   --   --  11 46* 12 02*  --   --  13 54* 10 27* 12 71*  --   --   --  10 24*  --  13 08*   HEMOGLOBIN g/dL 7 2* 7 3* 7 5* 7 8* 7 0* 7 6* 7 4*   < > 9 0* 8 4*  --    < > 9 9* 10 1* 9 9*   < >  --    < > 5 9*   < > 4 4*   HEMATOCRIT % 21 5* 22 0* 22 5* 23 3* 20 8* 22 2* 21 3*   < > 25 5* 24 8*  --    < > 29 6* 30 2* 28 8*   < >  --    < > 17 2*   < > 14 9*   PLATELETS Thousands/uL  --   --   --   --  367  --   --   --  341 375  --   --  608* 535* 407*  --   --   --  460*  --  635*   INR   --   --   --   --   --   --   --   --  1 36*  --  1 51*  --   --   --  1 05  --  1 12  --  1 48*  --  1 40*    < > = values in this interval not displayed       Results from last 7 days   Lab Units 06/18/22  0624 06/17/22  0536 06/16/22  0437 06/15/22  2123 06/15/22  0439 06/14/22  1044 06/13/22  0550 06/12/22  0507 06/11/22  1704   POTASSIUM mmol/L 3 9 3 0* 4 2 4 3 3 8   < > 3 7 4 0 4 4   CHLORIDE mmol/L 103 106 108 108 107   < > 109* 106 105   CO2 mmol/L 22 25 17* 18* 21   < > 21 16* 18*   BUN mg/dL 17 22 23 24 14   < > 20 27* 25 CREATININE mg/dL 0 98 1 17 1 00 1 10 1 04   < > 1 16 0 96 1 39*   CALCIUM mg/dL 7 5* 7 6* 7 7* 7 0* 7 8*   < > 7 6* 6 0* 7 4*   ALK PHOS U/L  --  38*  --   --   --   --  42 30* 40   ALT U/L  --  12  --   --   --   --  9 7 8   AST U/L  --  16  --   --   --   --  17 12* 18    < > = values in this interval not displayed  Imaging and Other Studies      Inhouse Medications     Current Facility-Administered Medications:     acetaminophen (TYLENOL) tablet 650 mg, 650 mg, Oral, Q6H PRN    clonazePAM (KlonoPIN) tablet 1 mg, 1 mg, Oral, HS, 1 mg at 06/17/22 2248    levothyroxine tablet 112 mcg, 112 mcg, Oral, Early Morning, 112 mcg at 06/18/22 0618    misoprostol (CYTOTEC) tablet 200 mcg, 200 mcg, Oral, 4x Daily (with meals and at bedtime), 200 mcg at 06/17/22 2249    multi-electrolyte (PLASMALYTE-A/ISOLYTE-S PH 7 4) IV solution, 75 mL/hr, Intravenous, Continuous, 75 mL/hr at 06/16/22 2203    ondansetron (ZOFRAN) injection 4 mg, 4 mg, Intravenous, Q6H PRN    pantoprazole (PROTONIX) 80 mg in sodium chloride 0 9 % 100 mL infusion, 8 mg/hr, Intravenous, Continuous, 8 mg/hr at 06/18/22 0618    potassium chloride (K-DUR,KLOR-CON) CR tablet 40 mEq, 40 mEq, Oral, BID, 40 mEq at 06/17/22 1812    sucralfate (CARAFATE) tablet 1 g, 1 g, Oral, 4x Daily (AC & HS), 1 g at 06/17/22 2249      Assessment/Plan:  1  Status post recurrent GI bleed from anastomotic marginal ulcer  2  Blood loss anemia    Bariatric surgery following, plan as noted  Would suggest starting clear liquids if okay with bariatric  Seems more calm today on clonazepam   Might benefit from IV iron                Adam Osman MD

## 2022-06-18 NOTE — PLAN OF CARE
Problem: OCCUPATIONAL THERAPY ADULT  Goal: Performs self-care activities at highest level of function for planned discharge setting  See evaluation for individualized goals  Description: Treatment Interventions: ADL retraining, Functional transfer training, UE strengthening/ROM, Endurance training, Patient/family training, Equipment evaluation/education, Compensatory technique education, Continued evaluation, Activityengagement, Energy conservation          See flowsheet documentation for full assessment, interventions and recommendations  Note: Limitation: Decreased ADL status, Decreased UE strength, Decreased Safe judgement during ADL, Decreased cognition, Decreased endurance, Decreased self-care trans, Decreased high-level ADLs  Prognosis: Good  Assessment: Pt is a 64 y o  female seen for OT evaluation s/p adm to [address] on 6/16/2022 w/ Acute blood loss anemia, Gastric ulcer, Hypocalcemia, Hyponatremia, and ambulatory dysfunction  Pt s/p 6 units PRBCs this admission  s/p EGD x2 with anastomotic ulcer s/p hemoclip, bicap and epi  Hemoglobin this AM: 7 2  Comorbidities affecting pts functional performance include a significant PMH of Anemia, Anxiety, Bipolar disorder, HTN, Essential tremor, Fibromyalgia, Savage-en-Y gastric bypass, gastric ulcer  Pt with active OT orders and activity orders for Up with assistance  Pt lives alone in a two level house with 2 WILLI  Pt has 1/2 bath on 1st floor, FOS to 2nd floor bed/bath  At baseline, pt reports I w/ ADLs, IADLs, and functional transfers/mobility w/o use of AD  Pt reports (-)  during OT eval on 6/13/22, today reports (+)   (+) fall PTA   Upon evaluation, pt currently requires Supervision for UB ADLs, Mod-Min A for LB ADLs, Min A for toileting, Supervision for bed mobility, and Min A for functional mobility/transfers 2* the following deficits impacting occupational performance: decreased strength, decreased balance, decreased tolerance, impaired memory, impaired problem solving and decreased safety awareness  These impairments, as well at pts fall risk, steps to enter environment, limited home support, difficulty performing ADLS, difficulty performing IADLS  and limited insight into deficits limit pts ability to safely engage in all baseline areas of occupation  Based on the aforementioned OT evaluation, functional performance deficits, and assessments, pt has been identified as a Moderate complexity evaluation  Pt to continue to benefit from continued acute OT services during hospital stay to address defined deficits and to maximize level of functional independence in the following Occupational Performance areas: grooming, bathing/shower, toilet hygiene, dressing, medication management, health maintenance, functional mobility, community mobility, clothing management, cleaning, meal prep and household maintenance  From OT standpoint, recommend STR vs Home OT pending continued progress upon D/C   OT will continue to follow pt 3-5x/wk to address the following goals to  w/in 10-14 days:     OT Discharge Recommendation: Post acute rehabilitation services (vs Home OT pending continued progress)  OT - [place] to Discharge: Yes (when medically cleared)

## 2022-06-18 NOTE — ASSESSMENT & PLAN NOTE
· Patient reports remote history of Savage-en-Y gastric bypass approximately 12 years ago, at [facility]   · Bariatric surgery following

## 2022-06-18 NOTE — PLAN OF CARE
Problem: GASTROINTESTINAL - ADULT  Goal: Maintains or returns to baseline bowel function  Description: INTERVENTIONS:  - Assess bowel function  - Encourage oral fluids to ensure adequate hydration  - Administer IV fluids if ordered to ensure adequate hydration  - Administer ordered medications as needed  - Encourage mobilization and activity  - Consider nutritional services referral to assist patient with adequate nutrition and appropriate food choices  Outcome: Progressing     Problem: GASTROINTESTINAL - ADULT  Goal: Minimal or absence of nausea and/or vomiting  Description: INTERVENTIONS:  - Administer IV fluids if ordered to ensure adequate hydration  - Maintain NPO status until nausea and vomiting are resolved  - Nasogastric tube if ordered  - Administer ordered antiemetic medications as needed  - Provide nonpharmacologic comfort measures as appropriate  - Advance diet as tolerated, if ordered  - Consider nutrition services referral to assist patient with adequate nutrition and appropriate food choices  Outcome: Progressing     Problem: Knowledge Deficit  Goal: Patient/family/caregiver demonstrates understanding of disease process, treatment plan, medications, and discharge instructions  Description: Complete learning assessment and assess knowledge base    Interventions:  - Provide teaching at level of understanding  - Provide teaching via preferred learning methods  Outcome: Progressing     Problem: INFECTION - ADULT  Goal: Absence or prevention of progression during hospitalization  Description: INTERVENTIONS:  - Assess and monitor for signs and symptoms of infection  - Monitor lab/diagnostic results  - Monitor all insertion sites, i e  indwelling lines, tubes, and drains  - Monitor endotracheal if appropriate and nasal secretions for changes in amount and color  - [place] appropriate cooling/warming therapies per order  - Administer medications as ordered  - Instruct and encourage patient and family to use good hand hygiene technique  - Identify and instruct in appropriate isolation precautions for identified infection/condition  Outcome: Progressing  Goal: Absence of fever/infection during neutropenic period  Description: INTERVENTIONS:  - Monitor WBC    Outcome: Progressing     Problem: PAIN - ADULT  Goal: Verbalizes/displays adequate comfort level or baseline comfort level  Description: Interventions:  - Encourage patient to monitor pain and request assistance  - Assess pain using appropriate pain scale  - Administer analgesics based on type and severity of pain and evaluate response  - Implement non-pharmacological measures as appropriate and evaluate response  - Consider cultural and social influences on pain and pain management  - Notify physician/advanced practitioner if interventions unsuccessful or patient reports new pain  Outcome: Progressing

## 2022-06-18 NOTE — PROGRESS NOTES
[facility]  Progress Note - Josh Sever 1945, 64 y o  female MRN: [id number]  Unit/Bed#: ICU 11 Encounter: [id number]  Primary Care Provider: Savannah Chen MD   Date and time admitted to hospital: 6/16/2022  9:16 PM    * Acute blood loss anemia  Assessment & Plan  · Admitted on 6/11 with acute blood loss anemia with an Hgb of 4 4 in ED given 2 units pRBC's  · Recent admission (05/24) for symptomatic anemia and discharged with Hgb of 8 3     · Hx of Gastric ulcers, see below  · Currently not on any AC/AP at home  · Started on IV Protonix and consulted GI  · EGD on 6/12 which showed a single large, deep, irregular, benign-appearing ulcer in the gastrojejunal anastomosis with adherent clot  Placed 2 clips, injected epinephrine, hemostasis achieved  · Emergent EGD on 6/15 showed Single large, deep, irregular ulcer in the gastrojejunal anastomosis with nonbleeding visible vessel  Induced coagulation and hemostasis achieved with 2 applications of bipolar cautery, epinephrine injection; hemostasis achieved  · Total products: 6 units PRBC's  · Surgery was consulted and recommended Misoprostol 200mg PO AC/HS  PLAN  · Continue to trend H/H q4h  · Transfuse for Hgb < 7 or active bleeding  · Continue Isolyte @ 75cc/hr  · Continue protonix drip  · Bariatric surgery following  · Continue NPO  · Zofran PRN for N/V  · GI following  · Follow up on biopsies      Gastric ulcer  Assessment & Plan  · Recent admission for symptomatic anemia  EGD during that admission with noted ulcer, no active bleeding  Patient was to have follow up EGD and to continue PPI and Carafate  · Again admitted for acute GI blood loss anemia, likely secondary to peptic ulcer disease with bariatric surgery status   s/p EGD x2 with anastomotic ulcer s/p hemoclip, bicap and epi  · Monitor hemoglobin, transfuse as needed  · Consult bariatric surgery  · Continue PPI drip and carafate       Hypocalcemia  Assessment & Plan  · Likely d/t blood transfusions  · Asymptomatic  · Repleted with 2g calcium gluconate  · Recheck ionized Ca     Hyponatremia  Assessment & Plan  Recent Labs     06/15/22  2123 06/16/22  0437 06/17/22  0536   SODIUM 134* 134* 138     · Likely due to NPO status   · Continue IVF  · Monitor BMP    Neutrophilic leukocytosis  Assessment & Plan  · No clear signs of infection  · Likely reactive  · Monitor CBC and fever curve        Ambulatory dysfunction  Assessment & Plan  · Likely related to malnutrition and deconditioning  · PT OT eval and treat  · Likely post acute rehab after discharge  ·       H/O bariatric surgery - bypass  Assessment & Plan  · Patient reports remote history of Savage-en-Y gastric bypass approximately 12 years ago, at [facility]   · Bariatric surgery following    Depression  Assessment & Plan  · Hold home Klonopin and Trazodone  · Seen by Neuropsych, deemed patient to not have capacity to make decisions  · Delirium precautions   · CAM-ICU       Hypothyroidism  Assessment & Plan  Lab Results   Component Value Date    [id number] 3 492 05/24/2022     ·  Patient taking meds with sips    Bilateral leg edema  Assessment & Plan  · Bilateral leg edema up to ankle noted on admission  · Denied chronic swelling of her legs  · She has received multiple IVF and blood transfusion since admission  · Likely due to low protein, third spacing and physical decondition  · Lower extremity ultrasound negative for DVT    Severe protein-calorie malnutrition ([address] )  Assessment & Plan  BMI 21 49  · Consult nutrition and utritional supplements  when able to have PO intake            ----------------------------------------------------------------------------------------  HPI/24hr events:  No acute events overnight  Patient remained hemodynamically stable with hemoglobin greater than 7 and no signs of acute bleeding      Patient appropriate for transfer out of the ICU today?: No  Disposition: Continue Critical Care Code Status: Level 1 - Full Code  ---------------------------------------------------------------------------------------  SUBJECTIVE  "I feel better "     Review of Systems   Constitutional: Positive for fatigue  HENT: Negative  Eyes: Negative  Respiratory: Negative  Cardiovascular: Negative  Gastrointestinal: Negative  Endocrine: Negative  Genitourinary: Negative  Musculoskeletal: Negative  Skin: Negative  Allergic/Immunologic: Negative  Neurological: Positive for weakness  Hematological: Negative  Psychiatric/Behavioral: Negative  Review of systems was reviewed and negative unless stated above in HPI/24-hour events   ---------------------------------------------------------------------------------------  OBJECTIVE    Vitals   Vitals:    22 2115 22 2215 22 2321 22 0000   BP: 115/52 116/53     Pulse: 82 70  66   Resp: (!) 28 (!) 25     Temp:   99 1 °F (37 3 °C)    TempSrc:   Temporal    SpO2: 99% 99%     Weight:       Height:         Temp (24hrs), Av 9 °F (37 2 °C), Min:98 3 °F (36 8 °C), Max:99 8 °F (37 7 °C)  Current: Temperature: 99 1 °F (37 3 °C)          Respiratory:  SpO2: SpO2: 99 %       Invasive/non-invasive ventilation settings   Respiratory  Report   Lab Data (Last 4 hours)    None         O2/Vent Data (Last 4 hours)    None                Physical Exam  Vitals and nursing note reviewed  Constitutional:       Appearance: She is ill-appearing and toxic-appearing  Comments: Underweight    HENT:      Head: Normocephalic and atraumatic  Right Ear: Tympanic membrane, ear canal and external ear normal       Left Ear: Tympanic membrane, ear canal and external ear normal       Mouth/Throat:      Mouth: Mucous membranes are dry  Pharynx: Oropharynx is clear  Eyes:      Extraocular Movements: Extraocular movements intact        Conjunctiva/sclera: Conjunctivae normal       Pupils: Pupils are equal, round, and reactive to light  Cardiovascular:      Rate and Rhythm: Normal rate and regular rhythm  Pulses: Normal pulses  Heart sounds: Normal heart sounds  Pulmonary:      Effort: Pulmonary effort is normal       Breath sounds: Normal breath sounds  Comments: Bilateral breath sounds diminished  On room air  Abdominal:      General: Bowel sounds are normal       Palpations: Abdomen is soft  Genitourinary:     Comments: Due to void  Musculoskeletal:         General: Normal range of motion  Cervical back: Normal range of motion and neck supple  Skin:     General: Skin is warm and dry  Capillary Refill: Capillary refill takes less than 2 seconds  Neurological:      Mental Status: She is oriented to person, place, and time  Mental status is at baseline     Psychiatric:         Mood and Affect: Mood normal          Behavior: Behavior normal              Laboratory and Diagnostics:  Results from last 7 days   Lab Units 06/18/22  0037 06/17/22  1816 06/17/22  1125 06/17/22  0536 06/16/22  2248 06/16/22  1620 06/16/22  1321 06/16/22  0437 06/15/22  2344 06/15/22  1300 06/15/22  0439 06/14/22  1044 06/13/22  0550 06/12/22  1339 06/12/22  0507 06/12/22  0102 06/11/22  1704   WBC Thousand/uL  --   --   --  10 02  --   --   --  11 46* 12 02*  --  13 54* 10 27* 12 71*  --  10 24*  --  13 08*   HEMOGLOBIN g/dL 7 3* 7 5* 7 8* 7 0* 7 6* 7 4* 7 6* 9 0* 8 4*   < > 9 9* 10 1* 9 9*   < > 5 9*   < > 4 4*   HEMATOCRIT % 22 0* 22 5* 23 3* 20 8* 22 2* 21 3* 23 4* 25 5* 24 8*   < > 29 6* 30 2* 28 8*   < > 17 2*   < > 14 9*   PLATELETS Thousands/uL  --   --   --  367  --   --   --  341 375  --  608* 535* 407*  --  460*  --  635*   NEUTROS PCT %  --   --   --  56  --   --   --  78*  --   --  69 66 69  --  57  --   --    BANDS PCT %  --   --   --   --   --   --   --   --   --   --   --   --   --   --   --   --  3   MONOS PCT %  --   --   --  10  --   --   --  3*  --   --  9 11 10  --  9  --   --    MONO PCT %  --   --   --   -- --   --   --   --   --   --   --   --   --   --   --   --  5    < > = values in this interval not displayed  Results from last 7 days   Lab Units 06/17/22  0536 06/16/22  0437 06/15/22  2123 06/15/22  0439 06/14/22  1044 06/13/22  0550 06/12/22  0507 06/11/22  1704   SODIUM mmol/L 138 134* 134* 136 132* 136 136 133*   POTASSIUM mmol/L 3 0* 4 2 4 3 3 8 4 0 3 7 4 0 4 4   CHLORIDE mmol/L 106 108 108 107 106 109* 106 105   CO2 mmol/L 25 17* 18* 21 22 21 16* 18*   ANION GAP mmol/L 7 9 8 8 4 6 14* 10   BUN mg/dL 22 23 24 14 15 20 27* 25   CREATININE mg/dL 1 17 1 00 1 10 1 04 1 08 1 16 0 96 1 39*   CALCIUM mg/dL 7 6* 7 7* 7 0* 7 8* 7 5* 7 6* 6 0* 7 4*   GLUCOSE RANDOM mg/dL 71 143* 136 92 104 86 69 119   ALT U/L 12  --   --   --   --  9 7 8   AST U/L 16  --   --   --   --  17 12* 18   ALK PHOS U/L 38*  --   --   --   --  42 30* 40   ALBUMIN g/dL 2 0*  --   --   --   --  2 4* 1 9* 2 3*   TOTAL BILIRUBIN mg/dL 0 56  --   --   --   --  0 75 1 09* 0 39     Results from last 7 days   Lab Units 06/17/22 0536 06/16/22 0437 06/13/22  0550 06/12/22  0507   MAGNESIUM mg/dL 1 8 1 8* 2 1 2 3   PHOSPHORUS mg/dL 2 9 4 0 2 7 2 7      Results from last 7 days   Lab Units 06/16/22  0437 06/15/22  2123 06/13/22  0550 06/12/22  1752 06/12/22  0507 06/11/22  1704   INR  1 36* 1 51* 1 05 1 12 1 48* 1 40*   PTT seconds  --  29  --   --   --   --           Results from last 7 days   Lab Units 06/15/22  2344 06/15/22  2107 06/12/22  0919   LACTIC ACID mmol/L 1 8 3 6* 0 5       Imaging: I have personally reviewed pertinent reports  No results found  Intake and Output  I/O       06/16 0701 06/17 0700 06/17 0701 06/18 0700    I V  (mL/kg) 523 6 (9 1) 737 (12 8)    Total Intake(mL/kg) 523 6 (9 1) 737 (12 8)    Urine (mL/kg/hr)  1600 (1 2)    Stool  0    Total Output  1600    Net +523 6 -863          Unmeasured Stool Occurrence  1 x          Height and Weights   Height: 5' 4" (162 6 cm)     Body mass index is 21 72 kg/m²    Weight (last 2 days)     Date/Time Weight    06/16/22 2130 57 4 (126 54)    06/16/22 2123 57 4 (126 54)            Nutrition       Diet Orders   (From admission, onward)             Start     Ordered    06/16/22 2122  Diet NPO; Sips with meds  Diet effective now        References:    Nutrtion Support Algorithm Enteral vs  Parenteral   Question Answer Comment   Diet Type NPO    NPO Except: Sips with meds    RD to adjust diet per protocol?  Yes        06/16/22 2122                  Active Medications  Scheduled Meds:  Current Facility-Administered Medications   Medication Dose Route Frequency Provider Last Rate    acetaminophen  650 mg Oral Q6H PRN Sherren Gaster, PA-C      clonazePAM  1 mg Oral HS Mauro Rough, CRNP      levothyroxine  112 mcg Oral Early Morning Mauro Rough, CRNP      misoprostol  200 mcg Oral 4x Daily (with meals and at bedtime) Sherren Gaster, PA-C      multi-electrolyte  75 mL/hr Intravenous Continuous Maxcine EricPING GradyNP 75 mL/hr (06/16/22 2203)    ondansetron  4 mg Intravenous Q6H PRN Sherren Gaster, PA-C      pantoprozole (PROTONIX) infusion (Continuous)  8 mg/hr Intravenous Continuous Sherren Gaster, PA-C 8 mg/hr (06/17/22 2033)    potassium chloride  40 mEq Oral BID Mauro Rough, CRNP      sucralfate  1 g Oral 4x Daily (AC & HS) Sherren Gaster, PA-C       Continuous Infusions:  multi-electrolyte, 75 mL/hr, Last Rate: 75 mL/hr (06/16/22 2203)  pantoprozole (PROTONIX) infusion (Continuous), 8 mg/hr, Last Rate: 8 mg/hr (06/17/22 2033)      PRN Meds:   acetaminophen, 650 mg, Q6H PRN  ondansetron, 4 mg, Q6H PRN        Invasive Devices Review  Invasive Devices  Report    Peripheral Intravenous Line  Duration           Peripheral IV 06/15/22 Distal;Left;Ventral (anterior) Forearm 3 days              ---------------------------------------------------------------------------------------  Care Time Delivered:   No Critical Care time spent       [facility] - AURELIA WALDEN TOLU Ruffin      Portions of the record may have been created with voice recognition software  Occasional wrong word or "sound a like" substitutions may have occurred due to the inherent limitations of voice recognition software    Read the chart carefully and recognize, using context, where substitutions have occurred

## 2022-06-18 NOTE — PHYSICAL THERAPY NOTE
Physical Therapy Evaluation    Performed at least 2 patient identifiers during session:  Patient Active Problem List   Diagnosis    Hypothyroidism    Gastroesophageal reflux disease without esophagitis    Depression    Fibromyalgia, primary    Iron deficiency    Numbness of foot    Dysphasia    Epigastric pain    COVID-19    Acute bronchitis    Shortness of breath    Medicare annual wellness visit, subsequent    Fibromyalgia syndrome    Osteopenia of both forearms    Cerumen debris on tympanic membrane of right ear    Nasal congestion    Bilateral hearing loss    Anemia    Dyspnea on exertion    Generalized weakness    Hyponatremia    Abnormal CT scan    H/O bariatric surgery - bypass    Fever    Gastric ulcer    Acute blood loss anemia    Ambulatory dysfunction    Neutrophilic leukocytosis    Hypocalcemia    Severe protein-calorie malnutrition (HCC)    Bilateral leg edema       Past Medical History:   Diagnosis Date    Anemia     Anxiety     Bipolar disorder (HCC)     Colon polyp     Disease of thyroid gland     Essential hypertension     Essential tremor     Fibromyalgia, primary     GERD (gastroesophageal reflux disease)     Inflammatory polyarthropathy (HCC)     Mammogram abnormal        Past Surgical History:   Procedure Laterality Date    BREAST BIOPSY Right 2015    benign    CARPAL TUNNEL RELEASE Bilateral     COLONOSCOPY      EGD AND COLONOSCOPY  01/01/2014    GASTRIC BYPASS  07/01/2012    MAMMO NEEDLE LOCALIZATION RIGHT (ALL INC) Right 2/6/2012    MAMMO NEEDLE LOCALIZATION RIGHT (ALL INC) Right 2/6/2012    ULNAR NERVE TRANSPOSITION Right         06/18/22 0902   PT Last Visit   PT Visit Date 06/18/22   Note Type   Note type Evaluation   Pain Assessment   Pain Assessment Tool 0-10   Pain Score No Pain   Restrictions/Precautions   Weight Bearing Precautions Per Order No   Other Precautions Impulsive;Cognitive; Bed Alarm;Multiple lines; Fall Risk;Telemetry   Home Living   Type of [address] [place] [address] level  (2 stairs to enter)   [employer] [facility]   (none)   Prior Function   Level of [place] Independent with ADLs and functional mobility; Needs assistance with IADLs   Lives With Alone   Receives Help From Family   ADL Assistance Independent   IADLs Needs assistance   Falls in the last 6 months 1 to 4  (1)   Comments pt lives alone  does not use assistive device  pt has assist from brother for driving , other needs  pt has had 1 fall  pt is very impulsive, distractable   General   Additional Pertinent History supine /49, HR 85, spO2 98%, after mobility- /61, , spO2 98%   Family/Caregiver Present No  (pt on phone with brother during session)   Cognition   Overall Cognitive Status Impaired   Orientation Level Oriented X4   Subjective   Subjective i finally had a good night's sleep, i'm a little wobbly due to being in bed too much   RUE Assessment   RUE Assessment WNL   LUE Assessment   LUE Assessment WNL   RLE Assessment   RLE Assessment X  (str 4+/5)   LLE Assessment   LLE Assessment X  (str 4+/5)   Coordination   Movements are Fluid and Coordinated 1   Sensation WFL   Bed Mobility   Rolling R 5  Supervision   Rolling L 5  Supervision   Supine to Sit 5  Supervision   Additional items Bedrails; Impulsive;Verbal cues   Transfers   Sit to Stand 4  Minimal assistance   Additional items Assist x 1;Bedrails; Impulsive;Verbal cues   Stand to Sit 4  Minimal assistance   Additional items Assist x 1; Armrests; Impulsive;Verbal cues   Ambulation/Elevation   Gait pattern Improper Weight shift;Narrow BRANDAN; Inconsistent beth  (impulsive)   Gait Assistance 4  Minimal assist   Additional items Assist x 1;Verbal cues; Tactile cues   Assistive Device None   Distance 6'   Balance   Static Sitting Fair +   Dynamic Sitting Fair   Static Standing Fair -   Dynamic Standing Poor +   Ambulatory Poor +   Endurance Deficit   Endurance Deficit No   Activity Tolerance   Activity Tolerance Treatment limited secondary to medical complications (Comment)   Medical Staff Made Aware OT   Nurse Made Aware yes   Assessment   Prognosis Good   Problem List Decreased endurance; Impaired balance;Decreased cognition; Impaired judgement;Decreased safety awareness   Assessment pt admitted with low hgb, thought ot be related to GI bleeding  pt transferred [facility] from [facility], pt referred to PT  pt was living alone in 2 story home with occassional assist from brother  pt does not drive or use assistive device  pt demonstrated mild functional limitations on eval due to decreased cognition, poor safety awareness, impulsivity and deficits in balance, gait stability, strength and GI function  pt  was able to amb short distance in room with miin assist, no assistive device  pt noted to have relatively low BP on arrival but increased to 132/61 after amb  heart rate wnl and spO2 98% on room air  pt will need skilled PT for safety training, gait and balance  currently recommend rehab but may improve gait rapidly  Barriers to Discharge Inaccessible home environment;Decreased caregiver support   Goals   Patient Goals none offered   STG Expiration Date 06/25/22   Short Term Goal #1 supervision for bed moblity, transfers, amb without assistive device for > 250'  stairs with railing and supervision  demonstrate good safety practices  improve balance by 1/2-1 grade   Plan   Treatment/Interventions Functional transfer training;LE strengthening/ROM; Elevations; Therapeutic exercise; Endurance training;Cognitive reorientation;Patient/family training;Equipment eval/education;Gait training;Spoke to nursing;OT   Recommendation   PT Discharge Recommendation Post acute rehabilitation services   Equipment Recommended   (tbd)   AM-PAC Basic Mobility Inpatient   Turning in Bed Without Bedrails 4   Lying on Back to Sitting on Edge of Flat Bed 4   Moving Bed to Chair 3   Standing Up From Chair 3   Walk in Room 3   Climb 3-5 Stairs 3   Basic Mobility Inpatient Raw Score 20   Basic Mobility Standardized Score 43 99   Highest Level Of Mobility   -HL Goal 6: Walk 10 steps or more   -HLM Achieved 6: Walk 10 steps or more       An AM-PAC Basic Mobility standardized score less than 42 9 suggests the patient may benefit from discharge to post-acute rehab services      History: co - morbidities, fall risk, , assist for adl's, cognition, multiple lines  Exam: impairments in locomotion, musculoskeletal, balance,GI function,cognition   Clinical: unstable/unpredictable- impulsive, fall risk due to cog, dec balance  Complexity:high  Farzaneh Cardona, PT

## 2022-06-18 NOTE — ASSESSMENT & PLAN NOTE
Recent Labs     06/15/22  2123 06/16/22  0437 06/17/22  0536   SODIUM 134* 134* 138     · Likely due to NPO status   · Continue IVF  · Monitor BMP

## 2022-06-18 NOTE — ASSESSMENT & PLAN NOTE
Lab Results   Component Value Date    [id number] 3 492 05/24/2022     ·  Patient taking meds with sips

## 2022-06-18 NOTE — PROGRESS NOTES
Consultation - Bariatric Surgery   Terry Phelps 64 y o  female MRN: [id number]  Unit/Bed#: ICU 11 Encounter: [id number]    Assessment/Plan     Assessment:  65 yo female with hx of RNYGB at [facility] in 2012 with hx of known marginal ulcers who presented to [facility] with a bleeding marginal ulcer now s/p EGDx2 with GI     Plan:  1  Continue IV protonix drip  2  Continue to trend H/H   3  Continue NPO  4  Zofran PRN for nausea/vomiting  5  Transfuse for Hgb < 7   6  Appreciate care per ICU team  7  If patient rebleeds will contact IR for possible interventions before taking the patient to the OR  8  Continue cytotec and carafate      6/18 - no acute events overnight  Her H/H has remained stable for the past 24 hrs (7 -> 7 8 -> 7 3 -> 7 2) without requiring any transfusions  She has remained hemodynamically stable  She denies nausea and vomiting as well as abdominal pain  History of Present Illness     HPI:  Terry Phelps is a 64 y o  female Body mass index is 21 72 kg/m²  with history of RNYGB at [facility] in 2012 who presented to [facility] ER on 6/11/22 with UGIB  She has undergone two EGDs with GI on 6/12 and 6/15 that showed a large, deep, irregular ulcer at the [address] anastomosis with nonbleeding visible vessel  Induced coagulation and hemostasis achieved with 2 applications of bipolar cautery, epinephrine injection  Since admission she has received 6 U pRBCs    Patient had melanotic stools over the past several days, however, this morning she notes that her most recent BM was normal without blood  She denies hematemesis  She denies nausea/vomiting or abdominal pain  No chest pain or shortness of breath  She was [facility]  on 6/16/22 for bariatric surgery evaluation     Consults    Review of Systems   Constitutional: Negative for chills and fever  HENT: Negative for ear pain and sore throat      Eyes: Negative for pain and visual disturbance  Respiratory: Negative for cough and shortness of breath  Cardiovascular: Negative for chest pain and palpitations  Gastrointestinal: Negative for abdominal pain and vomiting  Genitourinary: Negative for dysuria and hematuria  Musculoskeletal: Negative for arthralgias and back pain  Skin: Negative for color change and rash  Neurological: Negative for dizziness, seizures and syncope  All other systems reviewed and are negative        Historical Information   Past Medical History:   Diagnosis Date    Anemia     Anxiety     Bipolar disorder ([address] )     Colon polyp     Disease of thyroid gland     Essential hypertension     Essential tremor     Fibromyalgia, primary     GERD (gastroesophageal reflux disease)     Inflammatory polyarthropathy (HCC)     Mammogram abnormal      Past Surgical History:   Procedure Laterality Date    BREAST BIOPSY Right 2015    benign    CARPAL TUNNEL RELEASE Bilateral     COLONOSCOPY      EGD AND COLONOSCOPY  2014    GASTRIC BYPASS  2012    MAMMO NEEDLE LOCALIZATION RIGHT (ALL INC) Right 2012    MAMMO NEEDLE LOCALIZATION RIGHT (ALL INC) Right 2012    ULNAR NERVE TRANSPOSITION Right      Social History   Social History     Substance and Sexual Activity   Alcohol Use Yes    Alcohol/week: 4 0 standard drinks    Types: 4 Glasses of wine per week    Comment: rare     Social History     Substance and Sexual Activity   Drug Use Never     Social History     Tobacco Use   Smoking Status Former Smoker    Quit date: 12    Years since quittin 4   Smokeless Tobacco Never Used     Family History: Non-contributory      Meds/Allergies   No Known Allergies    Objective   First Vitals:   Blood Pressure: 105/65 (22)  Pulse: 84 (22)  Temperature: (!) 97 2 °F (36 2 °C) (22)  Temp Source: Temporal (22)  Respirations: 20 (22)  Height: 5' 4" (162 6 cm) (22)  Weight - Scale: 57 4 kg (126 lb 8 7 oz) (06/16/22 2123)  SpO2: 99 % (06/16/22 2125)    Current Vitals:   Blood Pressure: 113/51 (06/18/22 0615)  Pulse: 80 (06/18/22 0615)  Temperature: 99 1 °F (37 3 °C) (06/17/22 2321)  Temp Source: Temporal (06/17/22 2321)  Respirations: 21 (06/18/22 0615)  Height: 5' 4" (162 6 cm) (06/16/22 2130)  Weight - Scale: 57 4 kg (126 lb 8 7 oz) (06/16/22 2130)  SpO2: 96 % (06/18/22 0615)      Intake/Output Summary (Last 24 hours) at 6/18/2022 0722  Last data filed at 6/18/2022 0600  Gross per 24 hour   Intake 1247 ml   Output 1900 ml   Net -653 ml       Invasive Devices  Report    Peripheral Intravenous Line  Duration           Peripheral IV 06/15/22 Distal;Left;Ventral (anterior) Forearm 3 days                Physical Exam  Constitutional:       Appearance: Normal appearance  She is normal weight  Comments: Frail appearing    HENT:      Head: Normocephalic and atraumatic  Cardiovascular:      Rate and Rhythm: Normal rate  Pulmonary:      Effort: Pulmonary effort is normal    Abdominal:      General: Abdomen is flat  There is no distension  Palpations: Abdomen is soft  There is no mass  Tenderness: There is no guarding or rebound  Musculoskeletal:         General: Normal range of motion  Skin:     General: Skin is warm and dry  Neurological:      Mental Status: She is alert  Psychiatric:         Mood and Affect: Mood normal          Behavior: Behavior normal        Counseling / Coordination of Care  Total floor / unit time spent today 20 minutes  Greater than 50% of total time was spent with the patient and / or family counseling and / or coordination of care       Plan discussed with Dr Kirk Rouse MD

## 2022-06-18 NOTE — CASE MANAGEMENT
Case Management Discharge Planning Note    Patient name Steven Modi  Location ICU 11/ICU 11 MRN [id number]  : 1945 Date 2022       Current Admission Date: 2022  Current Admission Diagnosis:Acute blood loss anemia   Patient Active Problem List    Diagnosis Date Noted    Hypocalcemia 2022    Severe protein-calorie malnutrition ([address] ) 2022    Bilateral leg edema     Neutrophilic leukocytosis     Ambulatory dysfunction 2022    Acute blood loss anemia 2022    Gastric ulcer 2022    Fever 2022    Anemia 2022    Dyspnea on exertion 2022    Generalized weakness 2022    Hyponatremia 2022    Abnormal CT scan 2022    H/O bariatric surgery - bypass 2022    Cerumen debris on tympanic membrane of right ear 2022    Nasal congestion 2022    Bilateral hearing loss 2022    Fibromyalgia syndrome 2021    Osteopenia of both forearms 2021    Medicare annual wellness visit, subsequent 2021    Shortness of breath 2021    Acute bronchitis 2021    COVID-19 2021    Dysphasia 2020    Epigastric pain 2020    Hypothyroidism 2020    Gastroesophageal reflux disease without esophagitis 2020    Depression 2020    Fibromyalgia, primary 2020    Iron deficiency 2020    Numbness of foot 2020      LOS (days): 2  Geometric Mean LOS (GMLOS) (days): 4 40  Days to GMLOS:2 6     OBJECTIVE:  Risk of Unplanned Readmission Score: 18 65         Current admission status: Inpatient   Preferred Pharmacy:   [facility] [address] [address]  Phone: [phone] Fax: [phone]    Primary Care Provider: Allegra Curtis MD    Primary Insurance: [facility]  Secondary Insurance:     DISCHARGE DETAILS:    Discharge planning discussed with[de-identified] Brother  Freedom of Choice: Yes  Comments - Freedom of Choice: first choice is [facility]  Agreeable to additional SNF referrals being sent as backup  CM contacted family/caregiver?: Yes  Were Treatment Team discharge recommendations reviewed with patient/caregiver?: Yes  Did patient/caregiver verbalize understanding of patient care needs?: N/A- going to facility  Were patient/caregiver advised of the risks associated with not following Treatment Team discharge recommendations?: Yes    Contacts  Patient Contacts: Lucila Mortimer  Relationship to Patient[de-identified] Family  Phone Number: [phone]  Reason/Outcome: Continuity of Care, Discharge [address]         Is the patient interested in [facility] at discharge?: No    DME Referral Provided  Referral made for DME?: No    Other Referral/Resources/Interventions Provided:  Interventions: Short Term Rehab         Treatment Team Recommendation: Short Term Rehab  Discharge Destination Plan[de-identified] Short Term Rehab  Transport at Discharge : Wheelchair [facility]  CM called patient's brother, Lucila Mortimer at [phone] to discuss options  He said that first choice is [facility] because someone told him that this is a good rehab facility  He said that he is not reside in [facility] but does visit from time to time  He also mentioned that he is in [place] until next week or beginning of July  CM offered to place additional SNF referrals in case [facility] do not have a bed available or is unable to accommodate and he is agreeable to this  He said that he spoke with patient and rest of the family so all are on board for patient to go to [facility]  Once there is an accepting facility, insurance auth is required

## 2022-06-18 NOTE — OCCUPATIONAL THERAPY NOTE
Occupational Therapy Evaluation     Patient Name: Terry Phelps  MDLUV'L Date: 6/18/2022  Problem List  Principal Problem:    Acute blood loss anemia  Active Problems:    Hypothyroidism    Depression    Hyponatremia    H/O bariatric surgery - bypass    Gastric ulcer    Ambulatory dysfunction    Neutrophilic leukocytosis    Hypocalcemia    Severe protein-calorie malnutrition (HCC)    Bilateral leg edema    Past Medical History  Past Medical History:   Diagnosis Date    Anemia     Anxiety     Bipolar disorder (HCC)     Colon polyp     Disease of thyroid gland     Essential hypertension     Essential tremor     Fibromyalgia, primary     GERD (gastroesophageal reflux disease)     Inflammatory polyarthropathy (HCC)     Mammogram abnormal      Past Surgical History  Past Surgical History:   Procedure Laterality Date    BREAST BIOPSY Right 2015    benign    CARPAL TUNNEL RELEASE Bilateral     COLONOSCOPY      EGD AND COLONOSCOPY  01/01/2014    GASTRIC BYPASS  07/01/2012    MAMMO NEEDLE LOCALIZATION RIGHT (ALL INC) Right 2/6/2012    MAMMO NEEDLE LOCALIZATION RIGHT (ALL INC) Right 2/6/2012    ULNAR NERVE TRANSPOSITION Right            06/18/22 0909   OT Last Visit   OT Visit Date 06/18/22   Note Type   Note type Evaluation   Restrictions/Precautions   Weight Bearing Precautions Per Order No   Other Precautions Cognitive; Chair Alarm; Bed Alarm;Multiple lines;Telemetry; Fall Risk   Pain Assessment   Pain Assessment Tool 0-10   Pain Score No Pain   Home Living   Type of Home House   Home Layout Two level;1/2 bath on main level;Bed/bath upstairs  (2 WILLI)   Bathroom Shower/Tub Tub/shower unit   Bathroom Toilet Standard   Bathroom Equipment Grab bars in [place] [id number]   [place]ies [facility]   Additional Comments Pt lives alone in a two level house with 2 WILLI  Pt has 1/2 bath on 1st floor, FOS to 2nd floor bed/bath     Prior Function   Level of Arthur Independent with ADLs and functional mobility   Lives With Alone   Receives Help From Family  (Sister, brother-in-law, brother)   ADL Assistance Independent   IADLs Independent  (per pt report;)   Falls in the last 6 months 1 to 4  (1)   Vocational Retired   Comments At baseline, pt reports I w/ ADLs, IADLs, and functional transfers/mobility w/o use of AD  Pt reports (-)  during OT eval on 6/13/22, today reports (+)   (+) fall PTA  Lifestyle   Autonomy At baseline, pt reports I w/ ADLs, IADLs, and functional transfers/mobility w/o use of AD  Pt reports (-)  during OT eval on 6/13/22, today reports (+)   (+) fall PTA  Reciprocal Relationships Supportive family   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   Psychosocial   Psychosocial (WDL) WDL   Subjective   Subjective "I feel wobbly"   ADL   Where Assessed Edge of bed   Eating Assistance 7  Independent   Grooming Assistance 5  Supervision/Setup   UB Bathing Assistance 5  Supervision/Setup   LB Bathing Assistance 4  Minimal [facility] 3  Moderate [address] [place] [address] 4  Minimal Assistance   Bed Mobility   Supine to Sit 5  Supervision   Additional items HOB elevated; Bedrails; Impulsive;Verbal cues   Sit to Supine Unable to assess   Additional Comments Pt seated OOB in chair with chair alarm activated at end of session  Call bell and phone within reach  All needs met and pt reports no further questions for OT at this time  Transfers   Sit to Stand 4  Minimal assistance   Additional items Assist x 1; Impulsive;Verbal cues   Stand to Sit 4  Minimal assistance   Additional items Assist x 1;Verbal cues; Impulsive   Additional Comments Cues for safe technique and hand placement  Impulsive   Functional Mobility   Functional Mobility 4  Minimal assistance   Additional Comments Assist x1 w/o use of AD   Assist ofr line management   Balance   Static Sitting Fair +   Dynamic Sitting Fair   Static Standing Fair -   Dynamic Standing Poor +   Ambulatory Poor +   Activity Tolerance   Activity Tolerance Patient tolerated treatment well;Treatment limited secondary to medical complications (Comment)   Medical Staff Made Aware Silver Senior PT   Nurse Made Aware yes; Rasta Leigh RN   RUE Assessment   RUE Assessment [facility]   RUE Strength   RUE Overall Strength Within Functional Limits - able to perform ADL tasks with strength  (4/5 throughout)   LUE Assessment   LUE Assessment WFL   LUE Strength   LUE Overall Strength Within Functional Limits - able to perform ADL tasks with strength  (4/5 throughout)   Hand Function   Gross Motor Coordination Functional   Fine Motor Coordination Functional   Sensation   Light Touch No apparent deficits   Proprioception   Proprioception No apparent deficits   Vision-Basic Assessment   Current Vision Wears glasses only for reading   Vision - Complex Assessment   Ocular Range of Motion [facility]   Acuity Able to read clock/calendar on wall without difficulty; Able to read employee name badge without difficulty   Perception   Inattention/Neglect Appears intact   Cognition   Overall Cognitive Status Impaired   Arousal/Participation Alert; Cooperative   Attention Attends with cues to redirect   Orientation Level Oriented X4   Memory Decreased recall of precautions   Following Commands Follows one step commands with increased time or repetition   Comments Pleasant and cooperative  Impulsive throughout, limited insight into deficits  Pt would benefit from further cognitive evaluation   Assessment   Limitation Decreased ADL status; Decreased UE strength;Decreased Safe judgement during ADL;Decreased cognition;Decreased endurance;Decreased self-care trans;Decreased high-level ADLs   Prognosis Good   Assessment Pt is a 64 y o  female seen for OT evaluation s/p [facility] w/ Acute blood loss anemia, Gastric ulcer, Hypocalcemia, Hyponatremia, and ambulatory dysfunction  Pt s/p 6 units PRBCs this admission  s/p EGD x2 with anastomotic ulcer s/p hemoclip, bicap and epi  Hemoglobin this AM: 7 2  Comorbidities affecting pts functional performance include a significant PMH of Anemia, Anxiety, Bipolar disorder, HTN, Essential tremor, Fibromyalgia, Savage-en-Y gastric bypass, gastric ulcer  Pt with active OT orders and activity orders for Up with assistance  Pt lives alone in a two level house with 2 WILLI  Pt has 1/2 bath on 1st floor, FOS to 2nd floor bed/bath  At baseline, pt reports I w/ ADLs, IADLs, and functional transfers/mobility w/o use of AD  Pt reports (-)  during OT eval on 6/13/22, today reports (+)   (+) fall PTA  Upon evaluation, pt currently requires Supervision for UB ADLs, Mod-Min A for LB ADLs, Min A for toileting, Supervision for bed mobility, and Min A for functional mobility/transfers 2* the following deficits impacting occupational performance: decreased strength, decreased balance, decreased tolerance, impaired memory, impaired problem solving and decreased safety awareness  These impairments, as well at pts fall risk, steps to enter environment, limited home support, difficulty performing ADLS, difficulty performing IADLS  and limited insight into deficits limit pts ability to safely engage in all baseline areas of occupation  Based on the aforementioned OT evaluation, functional performance deficits, and assessments, pt has been identified as a Moderate complexity evaluation  Pt to continue to benefit from continued acute OT services during hospital stay to address defined deficits and to maximize level of functional independence in the following Occupational Performance areas: grooming, bathing/shower, toilet hygiene, dressing, medication management, health maintenance, functional mobility, community mobility, clothing management, cleaning, meal prep and household maintenance   From OT standpoint, recommend STR vs Home OT pending continued progress upon D/C  OT will continue to follow pt 3-5x/wk to address the following goals to  w/in 10-14 days:   Goals   Patient Goals None stated   LTG Time Frame 10-14   Long Term Goal Please refer to LTGs listed below   Plan   Treatment Interventions ADL retraining;Functional transfer training;UE strengthening/ROM; Endurance training;Patient/family training;Equipment evaluation/education; Compensatory technique education;Continued evaluation; Activityengagement; Energy conservation   Goal Expiration Date 22   OT Treatment Day 0   OT Frequency 3-5x/wk   Recommendation   OT Discharge Recommendation Post acute rehabilitation services  (vs Home OT pending continued progress)   OT - OK to Discharge Yes  (when medically cleared)   Additional Comments  The patient's raw score on the AM-PAC Daily Activity inpatient short form is 18, standardized score is 38 66, less than 39 4  Patients at this level are likely to benefit from discharge to post-acute rehabilitation services  Please refer to the recommendation of the Occupational Therapist for safe discharge planning     AM-PAC Daily Activity Inpatient   Lower Body Dressing 2   Bathing 3   Toileting 3   Upper Body Dressing 3   Grooming 3   Eating 4   Daily Activity Raw Score 18   Daily Activity Standardized Score (Calc for Raw Score >=11) 38 66   AM-PAC Applied Cognition Inpatient   Following a Speech/Presentation 3   Understanding Ordinary Conversation 4   Taking Medications 2   Remembering Where Things Are Placed or Put Away 3   Remembering List of 4-5 Errands 3   Taking Care of Complicated Tasks 2   Applied Cognition Raw Score 17   Applied Cognition Standardized Score 36 52       GOALS    Pt will improve activity tolerance to G for min 30 min txment sessions for increase engagement in functional tasks    Pt will complete bed mobility at a Mod I level w/ G balance/safety demonstrated to decrease caregiver assistance required     Pt will complete UB dressing/self care w/ mod I using adaptive device and DME as needed     Pt will complete LB dressing/self care w/ mod I using adaptive device and DME as needed    Pt will complete toileting w/ mod I w/ G hygiene/thoroughness using DME as needed    Pt will improve functional transfers to Mod I on/off all surfaces using DME as needed w/ G balance/safety     Pt will improve functional mobility during ADL/IADL/leisure tasks to Mod I using DME as needed w/ G balance/safety     Pt will be attentive 100% of the time during ongoing cognitive assessment w/ G participation to assist w/ safe d/c planning/recommendations    Pt will demonstrate G carryover of pt/caregiver education and training as appropriate w/o cues w/ good tolerance to increase safety during functional tasks    Pt will participate in simulated IADL management task to increase independence to Mod I w/ G safety and endurance    Pt will increase BUE strength by 1MM grade via AROM exercises to increase independence in ADLs and transfers    Pt will verbalize 3 potential fall hazards and identify appropriate compensatory techniques to decrease fall risk in home environment     Pt will increase standing tolerance to 8-10 mins with Fair+ dynamic standing balance to increase safety during participation in ADLs         Joycelyn Berry, OTR/L

## 2022-06-19 LAB
HCT VFR BLD AUTO: 23.8 % (ref 34.8–46.1)
HCT VFR BLD AUTO: 26.3 % (ref 34.8–46.1)
HCT VFR BLD AUTO: 27.5 % (ref 34.8–46.1)
HGB BLD-MCNC: 7.7 G/DL (ref 11.5–15.4)
HGB BLD-MCNC: 8.2 G/DL (ref 11.5–15.4)
HGB BLD-MCNC: 9 G/DL (ref 11.5–15.4)

## 2022-06-19 PROCEDURE — C9113 INJ PANTOPRAZOLE SODIUM, VIA: HCPCS

## 2022-06-19 PROCEDURE — 85018 HEMOGLOBIN: CPT | Performed by: INTERNAL MEDICINE

## 2022-06-19 PROCEDURE — 85014 HEMATOCRIT: CPT | Performed by: INTERNAL MEDICINE

## 2022-06-19 PROCEDURE — 99232 SBSQ HOSP IP/OBS MODERATE 35: CPT | Performed by: STUDENT IN AN ORGANIZED HEALTH CARE EDUCATION/TRAINING PROGRAM

## 2022-06-19 PROCEDURE — 99232 SBSQ HOSP IP/OBS MODERATE 35: CPT | Performed by: INTERNAL MEDICINE

## 2022-06-19 PROCEDURE — 99231 SBSQ HOSP IP/OBS SF/LOW 25: CPT | Performed by: INTERNAL MEDICINE

## 2022-06-19 RX ADMIN — SODIUM CHLORIDE 8 MG/HR: 9 INJECTION, SOLUTION INTRAVENOUS at 05:57

## 2022-06-19 RX ADMIN — SUCRALFATE 1 G: 1 TABLET ORAL at 06:01

## 2022-06-19 RX ADMIN — LEVOTHYROXINE SODIUM 112 MCG: 112 TABLET ORAL at 05:59

## 2022-06-19 RX ADMIN — SODIUM CHLORIDE, SODIUM GLUCONATE, SODIUM ACETATE, POTASSIUM CHLORIDE, MAGNESIUM CHLORIDE, SODIUM PHOSPHATE, DIBASIC, AND POTASSIUM PHOSPHATE 75 ML/HR: .53; .5; .37; .037; .03; .012; .00082 INJECTION, SOLUTION INTRAVENOUS at 05:57

## 2022-06-19 RX ADMIN — POTASSIUM CHLORIDE 40 MEQ: 20 TABLET, EXTENDED RELEASE ORAL at 08:35

## 2022-06-19 RX ADMIN — MISOPROSTOL 200 MCG: 200 TABLET ORAL at 16:33

## 2022-06-19 RX ADMIN — ONDANSETRON 4 MG: 2 INJECTION INTRAMUSCULAR; INTRAVENOUS at 06:17

## 2022-06-19 RX ADMIN — MISOPROSTOL 200 MCG: 200 TABLET ORAL at 11:49

## 2022-06-19 RX ADMIN — CLONAZEPAM 1 MG: 1 TABLET ORAL at 22:20

## 2022-06-19 RX ADMIN — SUCRALFATE 1 G: 1 TABLET ORAL at 16:33

## 2022-06-19 RX ADMIN — FOLIC ACID: 5 INJECTION, SOLUTION INTRAMUSCULAR; INTRAVENOUS; SUBCUTANEOUS at 10:52

## 2022-06-19 RX ADMIN — MISOPROSTOL 200 MCG: 200 TABLET ORAL at 08:35

## 2022-06-19 RX ADMIN — MISOPROSTOL 200 MCG: 200 TABLET ORAL at 21:22

## 2022-06-19 RX ADMIN — SODIUM CHLORIDE 8 MG/HR: 9 INJECTION, SOLUTION INTRAVENOUS at 16:38

## 2022-06-19 RX ADMIN — ACETAMINOPHEN 325MG 650 MG: 325 TABLET ORAL at 06:17

## 2022-06-19 RX ADMIN — POTASSIUM CHLORIDE 40 MEQ: 20 TABLET, EXTENDED RELEASE ORAL at 17:32

## 2022-06-19 RX ADMIN — SUCRALFATE 1 G: 1 TABLET ORAL at 11:49

## 2022-06-19 RX ADMIN — SUCRALFATE 1 G: 1 TABLET ORAL at 21:22

## 2022-06-19 RX ADMIN — SODIUM CHLORIDE, SODIUM GLUCONATE, SODIUM ACETATE, POTASSIUM CHLORIDE, MAGNESIUM CHLORIDE, SODIUM PHOSPHATE, DIBASIC, AND POTASSIUM PHOSPHATE 75 ML/HR: .53; .5; .37; .037; .03; .012; .00082 INJECTION, SOLUTION INTRAVENOUS at 20:12

## 2022-06-19 NOTE — PLAN OF CARE
Problem: Prexisting or High Potential for Compromised Skin Integrity  Goal: Skin integrity is maintained or improved  Description: INTERVENTIONS:  - Identify patients at risk for skin breakdown  - Assess and monitor skin integrity  - Assess and monitor nutrition and hydration status  - Monitor labs   - Assess for incontinence   - Turn and reposition patient  - Assist with mobility/ambulation  - Relieve pressure over bony prominences  - Avoid friction and shearing  - Provide appropriate hygiene as needed including keeping skin clean and dry  - Evaluate need for skin moisturizer/barrier cream  - Collaborate with interdisciplinary team   - Patient/family teaching  - Consider wound care consult   Outcome: Progressing     Problem: MOBILITY - ADULT  Goal: Maintain or return to baseline ADL function  Description: INTERVENTIONS:  -  Assess patient's ability to carry out ADLs; assess patient's baseline for ADL function and identify physical deficits which impact ability to perform ADLs (bathing, care of mouth/teeth, toileting, grooming, dressing, etc )  - Assess/evaluate cause of self-care deficits   - Assess range of motion  - Assess patient's mobility; develop plan if impaired  - Assess patient's need for assistive devices and provide as appropriate  - Encourage maximum independence but intervene and supervise when necessary  - Involve family in performance of ADLs  - Assess for home care needs following discharge   - Consider OT consult to assist with ADL evaluation and planning for discharge  - Provide patient education as appropriate  Outcome: Progressing  Goal: Maintains/Returns to pre admission functional level  Description: INTERVENTIONS:  - Perform BMAT or MOVE assessment daily    - Set and communicate daily mobility goal to care team and patient/family/caregiver  - Collaborate with rehabilitation services on mobility goals if consulted  - Perform Range of Motion 3 times a day    - Reposition patient every 2 hours   - Dangle patient 3 times a day  - Stand patient 3 times a day  - Ambulate patient 3 times a day  - Out of bed to chair 3 times a day   - Out of bed for meals 3 times a day  - Out of bed for toileting  - Record patient progress and toleration of activity level   Outcome: Progressing     Problem: Potential for Falls  Goal: Patient will remain free of falls  Description: INTERVENTIONS:  - Educate patient/family on patient safety including physical limitations  - Instruct patient to call for assistance with activity   - Consult OT/PT to assist with strengthening/mobility   - Keep Call bell within reach  - Keep bed low and locked with side rails adjusted as appropriate  - Keep care items and personal belongings within reach  - Initiate and maintain comfort rounds  - Make Fall Risk Sign visible to staff  - Offer Toileting every 2 Hours, in advance of need  - Initiate/Maintain bed alarm  - Obtain necessary fall risk management equipment:   - Apply yellow socks and bracelet for high fall risk patients  - Consider moving patient to room near nurses station  Outcome: Progressing     Problem: PAIN - ADULT  Goal: Verbalizes/displays adequate comfort level or baseline comfort level  Description: Interventions:  - Encourage patient to monitor pain and request assistance  - Assess pain using appropriate pain scale  - Administer analgesics based on type and severity of pain and evaluate response  - Implement non-pharmacological measures as appropriate and evaluate response  - Consider cultural and social influences on pain and pain management  - Notify physician/advanced practitioner if interventions unsuccessful or patient reports new pain  Outcome: Progressing     Problem: INFECTION - ADULT  Goal: Absence or prevention of progression during hospitalization  Description: INTERVENTIONS:  - Assess and monitor for signs and symptoms of infection  - Monitor lab/diagnostic results  - Monitor all insertion sites, i e  indwelling lines, tubes, and drains  - Monitor endotracheal if appropriate and nasal secretions for changes in amount and color  - [place] appropriate cooling/warming therapies per order  - Administer medications as ordered  - Instruct and encourage patient and family to use good hand hygiene technique  - Identify and instruct in appropriate isolation precautions for identified infection/condition  Outcome: Progressing  Goal: Absence of fever/infection during neutropenic period  Description: INTERVENTIONS:  - Monitor WBC    Outcome: Progressing     Problem: SAFETY ADULT  Goal: Maintain or return to baseline ADL function  Description: INTERVENTIONS:  -  Assess patient's ability to carry out ADLs; assess patient's baseline for ADL function and identify physical deficits which impact ability to perform ADLs (bathing, care of mouth/teeth, toileting, grooming, dressing, etc )  - Assess/evaluate cause of self-care deficits   - Assess range of motion  - Assess patient's mobility; develop plan if impaired  - Assess patient's need for assistive devices and provide as appropriate  - Encourage maximum independence but intervene and supervise when necessary  - Involve family in performance of ADLs  - Assess for home care needs following discharge   - Consider OT consult to assist with ADL evaluation and planning for discharge  - Provide patient education as appropriate  Outcome: Progressing  Goal: Maintains/Returns to pre admission functional level  Description: INTERVENTIONS:  - Perform BMAT or MOVE assessment daily    - Set and communicate daily mobility goal to care team and patient/family/caregiver  - Collaborate with rehabilitation services on mobility goals if consulted  - Perform Range of Motion 3 times a day  - Reposition patient every 2 hours    - Dangle patient 3 times a day  - Stand patient 3 times a day  - Ambulate patient 3 times a day  - Out of bed to chair 3 times a day   - Out of bed for meals 3 times a day  - Out of bed for toileting  - Record patient progress and toleration of activity level   Outcome: Progressing  Goal: Patient will remain free of falls  Description: INTERVENTIONS:  - Educate patient/family on patient safety including physical limitations  - Instruct patient to call for assistance with activity   - Consult OT/PT to assist with strengthening/mobility   - Keep Call bell within reach  - Keep bed low and locked with side rails adjusted as appropriate  - Keep care items and personal belongings within reach  - Initiate and maintain comfort rounds  - Make Fall Risk Sign visible to staff  - Offer Toileting every 2 Hours, in advance of need  - Initiate/Maintain bed alarm  - Obtain necessary fall risk management equipment:   - Apply yellow socks and bracelet for high fall risk patients  - Consider moving patient to room near nurses station  Outcome: Progressing     Problem: DISCHARGE PLANNING  Goal: Discharge to home or other facility with appropriate resources  Description: INTERVENTIONS:  - Identify barriers to discharge w/patient and caregiver  - Arrange for needed discharge resources and transportation as appropriate  - Identify discharge learning needs (meds, wound care, etc )  - Arrange for interpretive services to assist at discharge as needed  - Refer to Case Management Department for coordinating discharge planning if the patient needs post-hospital services based on physician/advanced practitioner order or complex needs related to functional status, cognitive ability, or social support system  Outcome: Progressing     Problem: Knowledge Deficit  Goal: Patient/family/caregiver demonstrates understanding of disease process, treatment plan, medications, and discharge instructions  Description: Complete learning assessment and assess knowledge base    Interventions:  - Provide teaching at level of understanding  - Provide teaching via preferred learning methods  Outcome: Progressing     Problem: GASTROINTESTINAL - ADULT  Goal: Minimal or absence of nausea and/or vomiting  Description: INTERVENTIONS:  - Administer IV fluids if ordered to ensure adequate hydration  - Maintain NPO status until nausea and vomiting are resolved  - Nasogastric tube if ordered  - Administer ordered antiemetic medications as needed  - Provide nonpharmacologic comfort measures as appropriate  - Advance diet as tolerated, if ordered  - Consider nutrition services referral to assist patient with adequate nutrition and appropriate food choices  Outcome: Progressing  Goal: Maintains or returns to baseline bowel function  Description: INTERVENTIONS:  - Assess bowel function  - Encourage oral fluids to ensure adequate hydration  - Administer IV fluids if ordered to ensure adequate hydration  - Administer ordered medications as needed  - Encourage mobilization and activity  - Consider nutritional services referral to assist patient with adequate nutrition and appropriate food choices  Outcome: Progressing  Goal: Maintains adequate nutritional intake  Description: INTERVENTIONS:  - Monitor percentage of each meal consumed  - Identify factors contributing to decreased intake, treat as appropriate  - Assist with meals as needed  - Monitor I&O, weight, and lab values if indicated  - Obtain nutrition services referral as needed  Outcome: Progressing  Goal: Establish and maintain optimal ostomy function  Description: INTERVENTIONS:  - Assess bowel function  - Encourage oral fluids to ensure adequate hydration  - Administer IV fluids if ordered to ensure adequate hydration   - Administer ordered medications as needed  - Encourage mobilization and activity  - Nutrition services referral to assist patient with appropriate food choices  - Assess stoma site  - Consider wound care consult   Outcome: Progressing  Goal: Oral mucous membranes remain intact  Description: INTERVENTIONS  - Assess oral mucosa and hygiene practices  - Implement preventative oral hygiene regimen  - Implement oral medicated treatments as ordered  - Initiate Nutrition services referral as needed  Outcome: Progressing

## 2022-06-19 NOTE — PROGRESS NOTES
[address] [facility]  Progress Note - Selma Arnold 1945, 64 y o  female MRN: [id number]  Unit/Bed#: E5 -01 Encounter: [id number]  Primary Care Provider: Pedro Castano MD   Date and time admitted to hospital: 6/16/2022  9:16 PM    * Acute blood loss anemia  Assessment & Plan  This is a 41-year-old female with history of Savage-en-Y gastric bypass, symptomatic anemia, gastric ulcer, hypothyroidism, depression initially admitted on 06/11/2022 to [facility] for hematemesis and blood per rectum  Initial hemoglobin 4 4 received multiple transfusions  Had initially EGD of 06/12/2022 GI consulted  Patient had further episodes of GI bleed and was hypotensive and tachycardic  Patient was transferred to [facility] and subsequently transferred to [place] [id number] for bariatric surgery evaluation  · Secondary to gastric ulcer  · Status post total 6 unit PRBCs  · Monitor H&H  · Transfuse further as needed    Gastric ulcer  Assessment & Plan  ·  revealed single large deep irregular benign appearing ulcer gastrojejunal anastomoses with adherent clot status post 2 clips, injected epinephrine and hemostasis achieved  · Emergent EGD 06/15/2022 revealed single large deep ulcer in gastrojejunal anastomosis with nonbleeding visible vessel, induced coagulation and hemostasis achieved with cautery and epinephrine injection  · Bariatric surgery follow-up appreciated  · GI follow-up appreciated  · Continue IV Protonix drip  · NPO, start clear liquid diet 06/20/2022  · PICC line placement for TPN    Bilateral leg edema  Assessment & Plan  · Lower extremity Doppler negative for DVT  · Likely due to low albumin and 3rd spacing    Severe protein-calorie malnutrition (HCC)  Assessment & Plan  Malnutrition Findings:      BMI Findings: Body mass index is 21 72 kg/m²         H/O bariatric surgery - bypass  Assessment & Plan  · 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 at [facility]  · Bariatric surgery follow-up appreciated    Depression  Assessment & Plan  · Klonopin and trazodone have been on hold  · Seen by neuropsychiatry  deemed patient not to have capacity to make decisions    Hypothyroidism  Assessment & Plan  · Patient taking meds with sips          VTE Pharmacologic Prophylaxis:   Pharmacologic:  Contraindicated due to GI bleed    Patient Centered Rounds: I have performed bedside rounds with nursing staff today  Education and Discussions with Family / Patient: Updated sister Barb Hancock    Time Spent for Care: 20 minutes  More than 50% of total time spent on counseling and coordination of care as described above  Current Length of Stay: 3 day(s)    Current Patient Status: Inpatient   Certification Statement: The patient will continue to require additional inpatient hospital stay due to GI bleed    Discharge Plan / Estimated Discharge Date: TBD    Code Status: Level 1 - Full Code      Subjective:   Patient seen and examined at bedside, comfortable, denies any abdominal pain, nausea, vomiting    Objective:     Vitals:   Temp (24hrs), Av 5 °F (36 4 °C), Min:97 1 °F (36 2 °C), Max:97 9 °F (36 6 °C)    Temp:  [97 1 °F (36 2 °C)-97 9 °F (36 6 °C)] 97 1 °F (36 2 °C)  HR:  [71] 71  Resp:  [19-20] 20  BP: ()/(50-55) 113/55  SpO2:  [97 %] 97 %  Body mass index is 21 72 kg/m²  Input and Output Summary (last 24 hours): Intake/Output Summary (Last 24 hours) at 2022 1801  Last data filed at 2022 1830  Gross per 24 hour   Intake 551 08 ml   Output --   Net 551 08 ml       Physical Exam:    Constitutional: Patient is in no acute distress  HEENT:  Normocephalic, atraumatic  Cardiovascular: Normal S1S2, RRR, No murmurs/rubs/gallops appreciated  Pulmonary:  Bilateral air entry, No rhonchi/rales/wheezing appreciated  Abdominal: Soft, Bowel sounds present, Non-tender, Non-distended  Extremities:  No cyanosis, clubbing or edema  Neurological: Cranial nerves II-XII grossly intact, sensation intact, otherwise no focal neurological symptoms  Skin:  Warm, dry    Additional Data:     Labs:    Results from last 7 days   Lab Units 06/19/22  0613 06/17/22  1125 06/17/22  0536   WBC Thousand/uL  --   --  10 02   HEMOGLOBIN g/dL 9 0*   < > 7 0*   HEMATOCRIT % 27 5*   < > 20 8*   PLATELETS Thousands/uL  --   --  367   NEUTROS PCT %  --   --  56   LYMPHS PCT %  --   --  27   MONOS PCT %  --   --  10   EOS PCT %  --   --  6    < > = values in this interval not displayed  Results from last 7 days   Lab Units 06/18/22  0624 06/17/22  0536   POTASSIUM mmol/L 3 9 3 0*   CHLORIDE mmol/L 103 106   CO2 mmol/L 22 25   BUN mg/dL 17 22   CREATININE mg/dL 0 98 1 17   CALCIUM mg/dL 7 5* 7 6*   ALK PHOS U/L  --  38*   ALT U/L  --  12   AST U/L  --  16     Results from last 7 days   Lab Units 06/16/22  0437   INR  1 36*        I Have Reviewed All Lab Data Listed Above          Recent Cultures (last 7 days):           Last 24 Hours Medication List:   Current Facility-Administered Medications   Medication Dose Route Frequency Provider Last Rate    acetaminophen  650 mg Oral Q6H PRN Mari Charles MD      clonazePAM  1 mg Oral HS Mari Charles MD      folic acid 1 mg, thiamine 100 mg in 0 9% sodium chloride 100 mL IVPB   Intravenous Daily Lety Flores  mL/hr at 06/19/22 1052    levothyroxine  112 mcg Oral Early Morning Mari Charles MD      misoprostol  200 mcg Oral 4x Daily (with meals and at bedtime) Mari Charles MD      multi-electrolyte  75 mL/hr Intravenous Continuous Mari Charles MD 75 mL/hr (06/19/22 0557)    ondansetron  4 mg Intravenous Q4H PRN Mari Charles MD      pantoprozole (PROTONIX) infusion (Continuous)  8 mg/hr Intravenous Continuous Mari Charles MD 8 mg/hr (06/19/22 1638)    potassium chloride  40 mEq Oral BID Mari Charles MD      sucralfate  1 g Oral 4x Daily (AC & HS) Mari Charles MD Today, Patient Was Seen By: Glenn Bartlett MD

## 2022-06-19 NOTE — PROGRESS NOTES
Consultation - Bariatric Surgery   Josh Sever 64 y o  female MRN: [id number]  Unit/Bed#: E5 -01 Encounter: [id number]    Assessment/Plan     Assessment:  63 yo female with hx of RNYGB at [facility] in 2012 with hx of known marginal ulcers who presented to [facility] with a bleeding marginal ulcer now s/p EGDx2 with GI     Plan:  1  Continue IV protonix drip  2  Continue to trend H/H q12hrs  3  Continue NPO, consider starting CLD tomorrow, 6/20  4  Zofran PRN for nausea/vomiting  5  Transfuse for Hgb < 7   6  If patient rebleeds will contact IR for possible interventions before taking the patient to the OR  7  Continue cytotec and carafate  8  Will plan for PICC line on Monday 6/18 - no acute events overnight  Her H/H has remained stable for the past 24 hrs (7 -> 7 8 -> 7 3 -> 7 2) without requiring any transfusions  She has remained hemodynamically stable  She denies nausea and vomiting as well as abdominal pain  6/19 - transferred out of the ICU yesterday  H/H continues to remain stable without further transfusions  She denies nausea and vomiting  No bloody bowel movements per nursing       History of Present Illness     HPI:  Josh Sever is a 64 y o  female Body mass index is 21 72 kg/m²  with history of RNYGB at [facility] in 2012 who presented to [facility] ER on 6/11/22 with UGIB  She has undergone two EGDs with GI on 6/12 and 6/15 that showed a large, deep, irregular ulcer at the [address] anastomosis with nonbleeding visible vessel  Induced coagulation and hemostasis achieved with 2 applications of bipolar cautery, epinephrine injection  Since admission she has received 6 U pRBCs    Patient had melanotic stools over the past several days, however, this morning she notes that her most recent BM was normal without blood  She denies hematemesis  She denies nausea/vomiting or abdominal pain  No chest pain or shortness of breath      She was transferred to [facility] on 22 for bariatric surgery evaluation     Consults    Review of Systems   Constitutional: Negative for chills and fever  HENT: Negative for ear pain and sore throat  Eyes: Negative for pain and visual disturbance  Respiratory: Negative for cough and shortness of breath  Cardiovascular: Negative for chest pain and palpitations  Gastrointestinal: Negative for abdominal pain and vomiting  Genitourinary: Negative for dysuria and hematuria  Musculoskeletal: Negative for arthralgias and back pain  Skin: Negative for color change and rash  Neurological: Negative for dizziness, seizures and syncope  All other systems reviewed and are negative        Historical Information   Past Medical History:   Diagnosis Date    Anemia     Anxiety     Bipolar disorder ([facility] [address] )     Colon polyp     Disease of thyroid gland     Essential hypertension     Essential tremor     Fibromyalgia, primary     GERD (gastroesophageal reflux disease)     Inflammatory polyarthropathy (HCC)     Mammogram abnormal      Past Surgical History:   Procedure Laterality Date    BREAST BIOPSY Right 2015    benign    CARPAL TUNNEL RELEASE Bilateral     COLONOSCOPY      EGD AND COLONOSCOPY  2014    GASTRIC BYPASS  2012    MAMMO NEEDLE LOCALIZATION RIGHT (ALL INC) Right 2012    MAMMO NEEDLE LOCALIZATION RIGHT (ALL INC) Right 2012    ULNAR NERVE TRANSPOSITION Right      Social History   Social History     Substance and Sexual Activity   Alcohol Use Yes    Alcohol/week: 4 0 standard drinks    Types: 4 Glasses of wine per week    Comment: rare     Social History     Substance and Sexual Activity   Drug Use Never     Social History     Tobacco Use   Smoking Status Former Smoker    Quit date: 12    Years since quittin 4   Smokeless Tobacco Never Used     Family History: Non-contributory      Meds/Allergies   No Known Allergies    Objective   First Vitals:   Blood Pressure: 105/65 (06/16/22 2130)  Pulse: 84 (06/16/22 2125)  Temperature: (!) 97 2 °F (36 2 °C) (06/16/22 2130)  Temp Source: Temporal (06/16/22 2130)  Respirations: 20 (06/16/22 2125)  Height: 5' 4" (162 6 cm) (06/16/22 2130)  Weight - Scale: 57 4 kg (126 lb 8 7 oz) (06/16/22 2123)  SpO2: 99 % (06/16/22 2125)    Current Vitals:   Blood Pressure: 104/50 (06/19/22 0644)  Pulse: 71 (06/18/22 2306)  Temperature: 97 6 °F (36 4 °C) (06/19/22 0644)  Temp Source: Temporal (06/18/22 1442)  Respirations: 20 (06/19/22 0644)  Height: 5' 4" (162 6 cm) (06/16/22 2130)  Weight - Scale: 57 4 kg (126 lb 8 7 oz) (06/19/22 0557)  SpO2: 97 % (06/18/22 2306)      Intake/Output Summary (Last 24 hours) at 6/19/2022 0741  Last data filed at 6/18/2022 1830  Gross per 24 hour   Intake 1062 5 ml   Output 800 ml   Net 262 5 ml       Invasive Devices  Report    Peripheral Intravenous Line  Duration           Peripheral IV 06/18/22 Dorsal (posterior); Right Hand <1 day                Physical Exam  Constitutional:       Appearance: Normal appearance  She is normal weight  Comments: Frail appearing    HENT:      Head: Normocephalic and atraumatic  Cardiovascular:      Rate and Rhythm: Normal rate  Pulmonary:      Effort: Pulmonary effort is normal    Abdominal:      General: Abdomen is flat  There is no distension  Palpations: Abdomen is soft  There is no mass  Tenderness: There is no guarding or rebound  Musculoskeletal:         General: Normal range of motion  Skin:     General: Skin is warm and dry  Neurological:      Mental Status: She is alert  Psychiatric:         Mood and Affect: Mood normal          Behavior: Behavior normal        Counseling / Coordination of Care  Total floor / unit time spent today 20 minutes  Greater than 50% of total time was spent with the patient and / or family counseling and / or coordination of care       Plan discussed with Dr Brody Kerr MD

## 2022-06-19 NOTE — ASSESSMENT & PLAN NOTE
·  revealed single large deep irregular benign appearing ulcer gastrojejunal anastomoses with adherent clot status post 2 clips, injected epinephrine and hemostasis achieved  · Emergent EGD 06/15/2022 revealed single large deep ulcer in gastrojejunal anastomosis with nonbleeding visible vessel, induced coagulation and hemostasis achieved with cautery and epinephrine injection  · Bariatric surgery follow-up appreciated  · GI follow-up appreciated  · Continue IV Protonix drip  · NPO, start clear liquid diet 06/20/2022  · PICC line placement for TPN

## 2022-06-19 NOTE — ASSESSMENT & PLAN NOTE
This is a 68-year-old female with history of Savage-en-Y gastric bypass, symptomatic anemia, gastric ulcer, hypothyroidism, depression initially admitted on 06/11/2022 to [facility] for hematemesis and blood per rectum  Initial hemoglobin 4 4 received multiple transfusions  Had initially EGD of 06/12/2022 GI consulted  Patient had further episodes of GI bleed and was hypotensive and tachycardic  Patient was transferred to [facility] and subsequently transferred to [facility]  for bariatric surgery evaluation      · Secondary to gastric ulcer  · Status post total 6 unit PRBCs  · Monitor H&H  · Transfuse further as needed

## 2022-06-19 NOTE — PROGRESS NOTES
Patient Name: Keke Schreiber  Patient MRN: [id number]  Date: 06/19/22  Service: [facility]    Subjective   Patient without complaints of abdominal pain  No further bleeding  Hemoglobin increased to 9  Apparently a very difficult stick  Patient wants to try clear liquids    Vitals  Blood pressure 104/50, pulse 71, temperature 97 6 °F (36 4 °C), temperature source Oral, resp  rate 20, height 5' 4" (1 626 m), weight 57 4 kg (126 lb 8 7 oz), SpO2 97 %  Abdomen soft benign nontender    Laboratory Studies  Results from last 7 days   Lab Units 06/19/22  0613 06/19/22  0016 06/18/22  1151 06/18/22  0624 06/18/22  0037 06/17/22  1816 06/17/22  1125 06/17/22  0536 06/16/22  1321 06/16/22  0437 06/15/22  2344 06/15/22  2123 06/15/22  1300 06/15/22  0439 06/14/22  1044 06/13/22  0550 06/12/22  2026 06/12/22  1752   WBC Thousand/uL  --   --   --   --   --   --   --  10 02  --  11 46* 12 02*  --   --  13 54* 10 27* 12 71*  --   --    HEMOGLOBIN g/dL 9 0* 7 7* 8 1* 7 2* 7 3* 7 5* 7 8* 7 0*   < > 9 0* 8 4*  --    < > 9 9* 10 1* 9 9*   < >  --    HEMATOCRIT % 27 5* 23 8* 24 7* 21 5* 22 0* 22 5* 23 3* 20 8*   < > 25 5* 24 8*  --    < > 29 6* 30 2* 28 8*   < >  --    PLATELETS Thousands/uL  --   --   --   --   --   --   --  367  --  341 375  --   --  608* 535* 407*  --   --    INR   --   --   --   --   --   --   --   --   --  1 36*  --  1 51*  --   --   --  1 05  --  1 12    < > = values in this interval not displayed       Results from last 7 days   Lab Units 06/18/22  0624 06/17/22  0536 06/16/22  0437 06/15/22  2123 06/15/22  0439 06/14/22  1044 06/13/22  0550   POTASSIUM mmol/L 3 9 3 0* 4 2 4 3 3 8   < > 3 7   CHLORIDE mmol/L 103 106 108 108 107   < > 109*   CO2 mmol/L 22 25 17* 18* 21   < > 21   BUN mg/dL 17 22 23 24 14   < > 20   CREATININE mg/dL 0 98 1 17 1 00 1 10 1 04   < > 1 16   CALCIUM mg/dL 7 5* 7 6* 7 7* 7 0* 7 8*   < > 7 6*   ALK PHOS U/L  --  38*  --   --   --   --  42   ALT U/L  --  12  -- --   --   --  9   AST U/L  --  16  --   --   --   --  17    < > = values in this interval not displayed  Imaging and Other Studies      Inhouse Medications     Current Facility-Administered Medications:     acetaminophen (TYLENOL) tablet 650 mg, 650 mg, Oral, Q6H PRN, 650 mg at 06/19/22 0617    clonazePAM (KlonoPIN) tablet 1 mg, 1 mg, Oral, HS, 1 mg at 61/06/09 0591    folic acid 1 mg, thiamine (VITAMIN B1) 100 mg in sodium chloride 0 9 % 100 mL IV piggyback, , Intravenous, Daily    levothyroxine tablet 112 mcg, 112 mcg, Oral, Early Morning, 112 mcg at 06/19/22 0559    misoprostol (CYTOTEC) tablet 200 mcg, 200 mcg, Oral, 4x Daily (with meals and at bedtime), 200 mcg at 06/19/22 0835    multi-electrolyte (PLASMALYTE-A/ISOLYTE-S PH 7 4) IV solution, 75 mL/hr, Intravenous, Continuous, 75 mL/hr at 06/19/22 0557    ondansetron (ZOFRAN) injection 4 mg, 4 mg, Intravenous, Q4H PRN, 4 mg at 06/19/22 0617    pantoprazole (PROTONIX) 80 mg in sodium chloride 0 9 % 100 mL infusion, 8 mg/hr, Intravenous, Continuous, 8 mg/hr at 06/19/22 0557    potassium chloride (K-DUR,KLOR-CON) CR tablet 40 mEq, 40 mEq, Oral, BID, 40 mEq at 06/19/22 0835    sucralfate (CARAFATE) tablet 1 g, 1 g, Oral, 4x Daily (AC & HS), 1 g at 06/19/22 0601      Assessment/Plan:  1  Recurrent GI bleed secondary to marginal or anastomotic ulcer status post endoscopic therapy x2  Transferred here for evaluation by surgery who is following  2  Blood loss anemia    Continue present Rx  Okay for clears  Would stop doing hemoglobins Q 6 hours  Apparently for possible PICC line  If the bleeding plans as above        Sherita Fenton MD

## 2022-06-19 NOTE — ASSESSMENT & PLAN NOTE
· Klonopin and trazodone have been on hold  · Seen by neuropsychiatry 6/13 deemed patient not to have capacity to make decisions

## 2022-06-19 NOTE — ASSESSMENT & PLAN NOTE
· 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 at [facility]  · Bariatric surgery follow-up appreciated

## 2022-06-19 NOTE — PLAN OF CARE
Problem: Prexisting or High Potential for Compromised Skin Integrity  Goal: Skin integrity is maintained or improved  Description: INTERVENTIONS:  - Identify patients at risk for skin breakdown  - Assess and monitor skin integrity  - Assess and monitor nutrition and hydration status  - Monitor labs   - Assess for incontinence   - Turn and reposition patient  - Assist with mobility/ambulation  - Relieve pressure over bony prominences  - Avoid friction and shearing  - Provide appropriate hygiene as needed including keeping skin clean and dry  - Evaluate need for skin moisturizer/barrier cream  - Collaborate with interdisciplinary team   - Patient/family teaching  - Consider wound care consult   Outcome: Progressing     Problem: MOBILITY - ADULT  Goal: Maintain or return to baseline ADL function  Description: INTERVENTIONS:  -  Assess patient's ability to carry out ADLs; assess patient's baseline for ADL function and identify physical deficits which impact ability to perform ADLs (bathing, care of mouth/teeth, toileting, grooming, dressing, etc )  - Assess/evaluate cause of self-care deficits   - Assess range of motion  - Assess patient's mobility; develop plan if impaired  - Assess patient's need for assistive devices and provide as appropriate  - Encourage maximum independence but intervene and supervise when necessary  - Involve family in performance of ADLs  - Assess for home care needs following discharge   - Consider OT consult to assist with ADL evaluation and planning for discharge  - Provide patient education as appropriate  Outcome: Progressing  Goal: Maintains/Returns to pre admission functional level  Description: INTERVENTIONS:  - Perform BMAT or MOVE assessment daily    - Set and communicate daily mobility goal to care team and patient/family/caregiver     - Collaborate with rehabilitation services on mobility goals if consulted    - Out of bed for toileting  - Record patient progress and toleration of activity level   Outcome: Progressing     Problem: Potential for Falls  Goal: Patient will remain free of falls  Description: INTERVENTIONS:  - Educate patient/family on patient safety including physical limitations  - Instruct patient to call for assistance with activity   - Consult OT/PT to assist with strengthening/mobility   - Keep Call bell within reach  - Keep bed low and locked with side rails adjusted as appropriate  - Keep care items and personal belongings within reach  - Initiate and maintain comfort rounds  - Make Fall Risk Sign visible to staff  - Apply yellow socks and bracelet for high fall risk patients  - Consider moving patient to room near nurses station  Outcome: Progressing     Problem: PAIN - ADULT  Goal: Verbalizes/displays adequate comfort level or baseline comfort level  Description: Interventions:  - Encourage patient to monitor pain and request assistance  - Assess pain using appropriate pain scale  - Administer analgesics based on type and severity of pain and evaluate response  - Implement non-pharmacological measures as appropriate and evaluate response  - Consider cultural and social influences on pain and pain management  - Notify physician/advanced practitioner if interventions unsuccessful or patient reports new pain  Outcome: Progressing     Problem: INFECTION - ADULT  Goal: Absence or prevention of progression during hospitalization  Description: INTERVENTIONS:  - Assess and monitor for signs and symptoms of infection  - Monitor lab/diagnostic results  - Monitor all insertion sites, i e  indwelling lines, tubes, and drains  - Monitor endotracheal if appropriate and nasal secretions for changes in amount and color  - [place] appropriate cooling/warming therapies per order  - Administer medications as ordered  - Instruct and encourage patient and family to use good hand hygiene technique  - Identify and instruct in appropriate isolation precautions for identified infection/condition  Outcome: Progressing  Goal: Absence of fever/infection during neutropenic period  Description: INTERVENTIONS:  - Monitor WBC    Outcome: Progressing     Problem: SAFETY ADULT  Goal: Maintain or return to baseline ADL function  Description: INTERVENTIONS:  -  Assess patient's ability to carry out ADLs; assess patient's baseline for ADL function and identify physical deficits which impact ability to perform ADLs (bathing, care of mouth/teeth, toileting, grooming, dressing, etc )  - Assess/evaluate cause of self-care deficits   - Assess range of motion  - Assess patient's mobility; develop plan if impaired  - Assess patient's need for assistive devices and provide as appropriate  - Encourage maximum independence but intervene and supervise when necessary  - Involve family in performance of ADLs  - Assess for home care needs following discharge   - Consider OT consult to assist with ADL evaluation and planning for discharge  - Provide patient education as appropriate  Outcome: Progressing  Goal: Maintains/Returns to pre admission functional level  Description: INTERVENTIONS:  - Perform BMAT or MOVE assessment daily    - Set and communicate daily mobility goal to care team and patient/family/caregiver     - Collaborate with rehabilitation services on mobility goals if consulted  - Out of bed for toileting  - Record patient progress and toleration of activity level   Outcome: Progressing  Goal: Patient will remain free of falls  Description: INTERVENTIONS:  - Educate patient/family on patient safety including physical limitations  - Instruct patient to call for assistance with activity   - Consult OT/PT to assist with strengthening/mobility   - Keep Call bell within reach  - Keep bed low and locked with side rails adjusted as appropriate  - Keep care items and personal belongings within reach  - Initiate and maintain comfort rounds  - Make Fall Risk Sign visible to staff  - Apply yellow socks and bracelet for high fall risk patients  - Consider moving patient to room near nurses station  Outcome: Progressing     Problem: DISCHARGE PLANNING  Goal: Discharge to home or other facility with appropriate resources  Description: INTERVENTIONS:  - Identify barriers to discharge w/patient and caregiver  - Arrange for needed discharge resources and transportation as appropriate  - Identify discharge learning needs (meds, wound care, etc )  - Arrange for interpretive services to assist at discharge as needed  - Refer to Case Management Department for coordinating discharge planning if the patient needs post-hospital services based on physician/advanced practitioner order or complex needs related to functional status, cognitive ability, or social support system  Outcome: Progressing     Problem: Knowledge Deficit  Goal: Patient/family/caregiver demonstrates understanding of disease process, treatment plan, medications, and discharge instructions  Description: Complete learning assessment and assess knowledge base    Interventions:  - Provide teaching at level of understanding  - Provide teaching via preferred learning methods  Outcome: Progressing     Problem: GASTROINTESTINAL - ADULT  Goal: Minimal or absence of nausea and/or vomiting  Description: INTERVENTIONS:  - Administer IV fluids if ordered to ensure adequate hydration  - Maintain NPO status until nausea and vomiting are resolved  - Nasogastric tube if ordered  - Administer ordered antiemetic medications as needed  - Provide nonpharmacologic comfort measures as appropriate  - Advance diet as tolerated, if ordered  - Consider nutrition services referral to assist patient with adequate nutrition and appropriate food choices  Outcome: Progressing  Goal: Maintains or returns to baseline bowel function  Description: INTERVENTIONS:  - Assess bowel function  - Encourage oral fluids to ensure adequate hydration  - Administer IV fluids if ordered to ensure adequate hydration  - Administer ordered medications as needed  - Encourage mobilization and activity  - Consider nutritional services referral to assist patient with adequate nutrition and appropriate food choices  Outcome: Progressing  Goal: Maintains adequate nutritional intake  Description: INTERVENTIONS:  - Monitor percentage of each meal consumed  - Identify factors contributing to decreased intake, treat as appropriate  - Assist with meals as needed  - Monitor I&O, weight, and lab values if indicated  - Obtain nutrition services referral as needed  Outcome: Progressing  Goal: Establish and maintain optimal ostomy function  Description: INTERVENTIONS:  - Assess bowel function  - Encourage oral fluids to ensure adequate hydration  - Administer IV fluids if ordered to ensure adequate hydration   - Administer ordered medications as needed  - Encourage mobilization and activity  - Nutrition services referral to assist patient with appropriate food choices  - Assess stoma site  - Consider wound care consult   Outcome: Progressing  Goal: Oral mucous membranes remain intact  Description: INTERVENTIONS  - Assess oral mucosa and hygiene practices  - Implement preventative oral hygiene regimen  - Implement oral medicated treatments as ordered  - Initiate Nutrition services referral as needed  Outcome: Progressing

## 2022-06-20 LAB
ALBUMIN SERPL BCP-MCNC: 1.8 G/DL (ref 3.5–5)
ANION GAP SERPL CALCULATED.3IONS-SCNC: 11 MMOL/L (ref 4–13)
BASOPHILS # BLD AUTO: 0.04 THOUSANDS/ΜL (ref 0–0.1)
BASOPHILS NFR BLD AUTO: 0 % (ref 0–1)
BUN SERPL-MCNC: 16 MG/DL (ref 5–25)
CALCIUM SERPL-MCNC: 7.7 MG/DL (ref 8.3–10.1)
CHLORIDE SERPL-SCNC: 101 MMOL/L (ref 100–108)
CO2 SERPL-SCNC: 18 MMOL/L (ref 21–32)
CREAT SERPL-MCNC: 1.13 MG/DL (ref 0.6–1.3)
EOSINOPHIL # BLD AUTO: 0.94 THOUSAND/ΜL (ref 0–0.61)
EOSINOPHIL NFR BLD AUTO: 10 % (ref 0–6)
ERYTHROCYTE [DISTWIDTH] IN BLOOD BY AUTOMATED COUNT: 18 % (ref 11.6–15.1)
FERRITIN SERPL-MCNC: 1182 NG/ML (ref 8–388)
FOLATE SERPL-MCNC: 18.5 NG/ML (ref 3.1–17.5)
GFR SERPL CREATININE-BSD FRML MDRD: 46 ML/MIN/1.73SQ M
GLUCOSE SERPL-MCNC: 55 MG/DL (ref 65–140)
HCT VFR BLD AUTO: 22.3 % (ref 34.8–46.1)
HCT VFR BLD AUTO: 25 % (ref 34.8–46.1)
HGB BLD-MCNC: 7.1 G/DL (ref 11.5–15.4)
HGB BLD-MCNC: 8 G/DL (ref 11.5–15.4)
IMM GRANULOCYTES # BLD AUTO: 0.09 THOUSAND/UL (ref 0–0.2)
IMM GRANULOCYTES NFR BLD AUTO: 1 % (ref 0–2)
IRON SATN MFR SERPL: 45 % (ref 15–50)
IRON SERPL-MCNC: 45 UG/DL (ref 50–170)
LYMPHOCYTES # BLD AUTO: 1.9 THOUSANDS/ΜL (ref 0.6–4.47)
LYMPHOCYTES NFR BLD AUTO: 19 % (ref 14–44)
MCH RBC QN AUTO: 30.4 PG (ref 26.8–34.3)
MCHC RBC AUTO-ENTMCNC: 32 G/DL (ref 31.4–37.4)
MCV RBC AUTO: 95 FL (ref 82–98)
MONOCYTES # BLD AUTO: 1.06 THOUSAND/ΜL (ref 0.17–1.22)
MONOCYTES NFR BLD AUTO: 11 % (ref 4–12)
NEUTROPHILS # BLD AUTO: 5.81 THOUSANDS/ΜL (ref 1.85–7.62)
NEUTS SEG NFR BLD AUTO: 59 % (ref 43–75)
NRBC BLD AUTO-RTO: 0 /100 WBCS
PLATELET # BLD AUTO: 489 THOUSANDS/UL (ref 149–390)
PMV BLD AUTO: 9.6 FL (ref 8.9–12.7)
POTASSIUM SERPL-SCNC: 4.7 MMOL/L (ref 3.5–5.3)
PREALB SERPL-MCNC: 6.6 MG/DL (ref 18–40)
RBC # BLD AUTO: 2.63 MILLION/UL (ref 3.81–5.12)
SODIUM SERPL-SCNC: 130 MMOL/L (ref 136–145)
TIBC SERPL-MCNC: 100 UG/DL (ref 250–450)
VIT B12 SERPL-MCNC: 1523 PG/ML (ref 100–900)
WBC # BLD AUTO: 9.84 THOUSAND/UL (ref 4.31–10.16)

## 2022-06-20 PROCEDURE — 82306 VITAMIN D 25 HYDROXY: CPT | Performed by: SURGERY

## 2022-06-20 PROCEDURE — 82746 ASSAY OF FOLIC ACID SERUM: CPT | Performed by: SURGERY

## 2022-06-20 PROCEDURE — 99232 SBSQ HOSP IP/OBS MODERATE 35: CPT | Performed by: INTERNAL MEDICINE

## 2022-06-20 PROCEDURE — 85025 COMPLETE CBC W/AUTO DIFF WBC: CPT | Performed by: INTERNAL MEDICINE

## 2022-06-20 PROCEDURE — 84425 ASSAY OF VITAMIN B-1: CPT | Performed by: SURGERY

## 2022-06-20 PROCEDURE — 83540 ASSAY OF IRON: CPT | Performed by: SURGERY

## 2022-06-20 PROCEDURE — 36569 INSJ PICC 5 YR+ W/O IMAGING: CPT

## 2022-06-20 PROCEDURE — 84590 ASSAY OF VITAMIN A: CPT | Performed by: SURGERY

## 2022-06-20 PROCEDURE — 82607 VITAMIN B-12: CPT | Performed by: SURGERY

## 2022-06-20 PROCEDURE — 3E0336Z INTRODUCTION OF NUTRITIONAL SUBSTANCE INTO PERIPHERAL VEIN, PERCUTANEOUS APPROACH: ICD-10-PCS | Performed by: INTERNAL MEDICINE

## 2022-06-20 PROCEDURE — 82180 ASSAY OF ASCORBIC ACID: CPT | Performed by: SURGERY

## 2022-06-20 PROCEDURE — 84134 ASSAY OF PREALBUMIN: CPT | Performed by: SURGERY

## 2022-06-20 PROCEDURE — 99232 SBSQ HOSP IP/OBS MODERATE 35: CPT | Performed by: SURGERY

## 2022-06-20 PROCEDURE — 84630 ASSAY OF ZINC: CPT | Performed by: SURGERY

## 2022-06-20 PROCEDURE — 85018 HEMOGLOBIN: CPT | Performed by: SURGERY

## 2022-06-20 PROCEDURE — C1751 CATH, INF, PER/CENT/MIDLINE: HCPCS

## 2022-06-20 PROCEDURE — 05HY33Z INSERTION OF INFUSION DEVICE INTO UPPER VEIN, PERCUTANEOUS APPROACH: ICD-10-PCS | Performed by: SURGERY

## 2022-06-20 PROCEDURE — 83550 IRON BINDING TEST: CPT | Performed by: SURGERY

## 2022-06-20 PROCEDURE — 85014 HEMATOCRIT: CPT | Performed by: SURGERY

## 2022-06-20 PROCEDURE — 80048 BASIC METABOLIC PNL TOTAL CA: CPT | Performed by: INTERNAL MEDICINE

## 2022-06-20 PROCEDURE — C9113 INJ PANTOPRAZOLE SODIUM, VIA: HCPCS

## 2022-06-20 PROCEDURE — 82728 ASSAY OF FERRITIN: CPT | Performed by: SURGERY

## 2022-06-20 PROCEDURE — 82040 ASSAY OF SERUM ALBUMIN: CPT | Performed by: SURGERY

## 2022-06-20 RX ORDER — FOLIC ACID 1 MG/1
1 TABLET ORAL DAILY
Status: DISCONTINUED | OUTPATIENT
Start: 2022-06-21 | End: 2022-06-28 | Stop reason: HOSPADM

## 2022-06-20 RX ORDER — LANOLIN ALCOHOL/MO/W.PET/CERES
100 CREAM (GRAM) TOPICAL DAILY
Status: DISCONTINUED | OUTPATIENT
Start: 2022-06-21 | End: 2022-06-28 | Stop reason: HOSPADM

## 2022-06-20 RX ORDER — PANTOPRAZOLE SODIUM 40 MG/1
40 TABLET, DELAYED RELEASE ORAL
Status: DISCONTINUED | OUTPATIENT
Start: 2022-06-20 | End: 2022-06-28 | Stop reason: HOSPADM

## 2022-06-20 RX ADMIN — SODIUM CHLORIDE 8 MG/HR: 9 INJECTION, SOLUTION INTRAVENOUS at 02:26

## 2022-06-20 RX ADMIN — CLONAZEPAM 1 MG: 1 TABLET ORAL at 22:55

## 2022-06-20 RX ADMIN — SODIUM CHLORIDE, SODIUM GLUCONATE, SODIUM ACETATE, POTASSIUM CHLORIDE, MAGNESIUM CHLORIDE, SODIUM PHOSPHATE, DIBASIC, AND POTASSIUM PHOSPHATE 75 ML/HR: .53; .5; .37; .037; .03; .012; .00082 INJECTION, SOLUTION INTRAVENOUS at 09:04

## 2022-06-20 RX ADMIN — MISOPROSTOL 200 MCG: 200 TABLET ORAL at 09:04

## 2022-06-20 RX ADMIN — SUCRALFATE 1 G: 1 TABLET ORAL at 06:49

## 2022-06-20 RX ADMIN — POTASSIUM CHLORIDE 40 MEQ: 20 TABLET, EXTENDED RELEASE ORAL at 16:37

## 2022-06-20 RX ADMIN — ONDANSETRON 4 MG: 2 INJECTION INTRAMUSCULAR; INTRAVENOUS at 09:04

## 2022-06-20 RX ADMIN — Medication: at 20:48

## 2022-06-20 RX ADMIN — POTASSIUM CHLORIDE 40 MEQ: 20 TABLET, EXTENDED RELEASE ORAL at 09:04

## 2022-06-20 RX ADMIN — FOLIC ACID: 5 INJECTION, SOLUTION INTRAMUSCULAR; INTRAVENOUS; SUBCUTANEOUS at 09:04

## 2022-06-20 RX ADMIN — MISOPROSTOL 200 MCG: 200 TABLET ORAL at 20:47

## 2022-06-20 RX ADMIN — MISOPROSTOL 200 MCG: 200 TABLET ORAL at 16:37

## 2022-06-20 RX ADMIN — MISOPROSTOL 200 MCG: 200 TABLET ORAL at 12:16

## 2022-06-20 RX ADMIN — SUCRALFATE 1 G: 1 TABLET ORAL at 12:16

## 2022-06-20 RX ADMIN — SUCRALFATE 1 G: 1 TABLET ORAL at 16:37

## 2022-06-20 RX ADMIN — SUCRALFATE 1 G: 1 TABLET ORAL at 20:47

## 2022-06-20 RX ADMIN — LEVOTHYROXINE SODIUM 112 MCG: 112 TABLET ORAL at 06:49

## 2022-06-20 NOTE — ASSESSMENT & PLAN NOTE
· Lower extremity Doppler negative for DVT  · Likely due to low albumin and 3rd spacing  · Elevate when resting  · Consider compression stockings

## 2022-06-20 NOTE — ASSESSMENT & PLAN NOTE
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

## 2022-06-20 NOTE — PROGRESS NOTES
Consultation - Bariatric Surgery   Selma Arnold 64 y o  female MRN: [id number]  Unit/Bed#: E5 -01 Encounter: [id number]    Assessment/Plan     Assessment:  63 yo female with hx of RNYGB at [facility] in 2012 with hx of known marginal ulcers who presented to [address] [place] [address] with a bleeding marginal ulcer now s/p EGDx2 with GI     Plan:  1  Continue IV protonix  2  Continue to trend H/H q24h  3  Awaiting H/h this AM  If stable, start CLD  4  Zofran PRN for nausea/vomiting  5  Transfuse for Hgb < 7   6  If patient rebleeds will contact IR for possible interventions before taking the patient to the OR  7  Continue cytotec and carafate  8  Will plan for PICC line on Monday      History of Present Illness     Subjective:  Patient denies overnight events  She appears agitated due to her family attempting to contact her, and she does not have a good relationship with them  Otherwise states she has not had a BM in 2 days  Denies emesis  Denies abdominal pain  Hospital Course:  Selma Arnold is a 64 y o  female Body mass index is 21 72 kg/m²  with history of RNYGB at [facility] in 2012 who presented to [address] [place] [address] ER on 6/11/22 with UGIB  She has undergone two EGDs with GI on 6/12 and 6/15 that showed a large, deep, irregular ulcer at the [address] [facility] anastomosis with nonbleeding visible vessel  Induced coagulation and hemostasis achieved with 2 applications of bipolar cautery, epinephrine injection  Since admission she has received 6 U pRBCs    She was transferred to [facility] on 6/16/22 for bariatric surgery evaluation  6/18 - no acute events overnight  Her H/H has remained stable for the past 24 hrs (7 -> 7 8 -> 7 3 -> 7 2) without requiring any transfusions  She has remained hemodynamically stable  She denies nausea and vomiting as well as abdominal pain  6/19 - transferred out of the ICU yesterday  H/H continues to remain stable without further transfusions   She denies nausea and vomiting  No bloody bowel movements per nursing    Consults    Review of Systems   Constitutional: Negative for chills and fever  HENT: Negative for ear pain and sore throat  Eyes: Negative for pain and visual disturbance  Respiratory: Negative for cough and shortness of breath  Cardiovascular: Negative for chest pain and palpitations  Gastrointestinal: Negative for abdominal pain and vomiting  Genitourinary: Negative for dysuria and hematuria  Musculoskeletal: Negative for arthralgias and back pain  Skin: Negative for color change and rash  Neurological: Negative for dizziness, seizures and syncope  All other systems reviewed and are negative        Historical Information   Past Medical History:   Diagnosis Date    Anemia     Anxiety     Bipolar disorder ([facility] [address] )     Colon polyp     Disease of thyroid gland     Essential hypertension     Essential tremor     Fibromyalgia, primary     GERD (gastroesophageal reflux disease)     Inflammatory polyarthropathy (HCC)     Mammogram abnormal      Past Surgical History:   Procedure Laterality Date    BREAST BIOPSY Right 2015    benign    CARPAL TUNNEL RELEASE Bilateral     COLONOSCOPY      EGD AND COLONOSCOPY  2014    GASTRIC BYPASS  2012    MAMMO NEEDLE LOCALIZATION RIGHT (ALL INC) Right 2012    MAMMO NEEDLE LOCALIZATION RIGHT (ALL INC) Right 2012    ULNAR NERVE TRANSPOSITION Right      Social History   Social History     Substance and Sexual Activity   Alcohol Use Yes    Alcohol/week: 4 0 standard drinks    Types: 4 Glasses of wine per week    Comment: rare     Social History     Substance and Sexual Activity   Drug Use Never     Social History     Tobacco Use   Smoking Status Former Smoker    Quit date:     Years since quittin 4   Smokeless Tobacco Never Used     Family History: Non-contributory      Meds/Allergies   No Known Allergies    Objective   First Vitals:   Blood Pressure: 105/65 (06/16/22 2130)  Pulse: 84 (06/16/22 2125)  Temperature: (!) 97 2 °F (36 2 °C) (06/16/22 2130)  Temp Source: Temporal (06/16/22 2130)  Respirations: 20 (06/16/22 2125)  Height: 5' 4" (162 6 cm) (06/16/22 2130)  Weight - Scale: 57 4 kg (126 lb 8 7 oz) (06/16/22 2123)  SpO2: 99 % (06/16/22 2125)    Current Vitals:   Blood Pressure: 115/58 (06/19/22 2242)  Pulse: 76 (06/20/22 0110)  Temperature: 98 °F (36 7 °C) (06/19/22 2242)  Temp Source: Oral (06/19/22 0644)  Respirations: 20 (06/19/22 0644)  Height: 5' 4" (162 6 cm) (06/16/22 2130)  Weight - Scale: 57 4 kg (126 lb 8 7 oz) (06/20/22 0600)  SpO2: 95 % (06/20/22 0110)    No intake or output data in the 24 hours ending 06/20/22 0733    Invasive Devices  Report    Peripheral Intravenous Line  Duration           Peripheral IV 06/18/22 Dorsal (posterior); Right Hand 1 day          Drain  Duration           External Urinary Catheter <1 day                Physical Exam  Constitutional:       Appearance: Normal appearance  She is normal weight  Comments: Frail appearing    HENT:      Head: Normocephalic and atraumatic  Cardiovascular:      Rate and Rhythm: Normal rate  Pulmonary:      Effort: Pulmonary effort is normal    Abdominal:      General: Abdomen is flat  There is no distension  Palpations: Abdomen is soft  There is no mass  Tenderness: There is no guarding or rebound  Musculoskeletal:         General: Normal range of motion  Skin:     General: Skin is warm and dry  Neurological:      Mental Status: She is alert  Psychiatric:         Mood and Affect: Mood normal          Behavior: Behavior normal        Counseling / Coordination of Care  Total floor / unit time spent today 20 minutes  Greater than 50% of total time was spent with the patient and / or family counseling and / or coordination of care       Plan discussed with Dr Brody Kerr MD

## 2022-06-20 NOTE — PROCEDURES
Insert PICC line    Date/Time: 6/20/2022 11:30 AM  Performed by: Christopher Best RN  Authorized by: Rocio King DO     Patient location:  Bedside  Consent:     Consent obtained:  Written    Consent given by:  Patient    Procedural risks discussed: consent obtained by physician  [place] protocol:     Procedure explained and questions answered to patient or proxy's satisfaction: yes      Relevant documents present and verified: yes      Test results available and properly labeled: yes      Radiology Images displayed and confirmed  If images not available, report reviewed: yes      Required blood products, implants, devices, and special equipment available: yes      Site/side marked: yes      Immediately prior to procedure, a time out was called: yes      Patient identity confirmed:  Verbally with patient, arm band, provided demographic data and hospital-assigned identification number  Pre-procedure details:     Hand hygiene: Hand hygiene performed prior to insertion      Sterile barrier technique: All elements of maximal sterile technique followed      Skin preparation:  ChloraPrep    Skin preparation agent: Skin preparation agent completely dried prior to procedure    Indications:     PICC line indications: total parenteral nutrition    Anesthesia (see MAR for exact dosages):      Anesthesia method:  Local infiltration (3ml)    Local anesthetic:  Lidocaine 1% w/o epi  Procedure details:     Location:  [place]    Vessel type: vein      Laterality:  Right    Approach: percutaneous technique used      Patient position:  Flat    Procedural supplies:  Double lumen    Catheter size:  5 Fr    Landmarks identified: yes      Ultrasound guidance: yes      Ultrasound image availability:  Not saved    Sterile ultrasound techniques: Sterile gel and sterile probe covers were used      Number of attempts:  1    Successful placement: yes      Vessel of catheter tip end:  Sherlock 3CG confirmed (sherlock 3cg procedure record confirmed placement sent to medical records)    Total catheter length (cm):  37    Catheter out on skin (cm):  0    Max flow rate:  999ml/hr    Arm circumference:  28cm  Post-procedure details:     Post-procedure:  Dressing applied and securement device placed    Assessment:  Blood return through all ports and free fluid flow (sherlock 3cg)    Post-procedure complications: none      Patient tolerance of procedure:   Tolerated well, no immediate complications  Comments:      Lot#[id number] 2023-03-31

## 2022-06-20 NOTE — PLAN OF CARE
Problem: Prexisting or High Potential for Compromised Skin Integrity  Goal: Skin integrity is maintained or improved  Description: INTERVENTIONS:  - Identify patients at risk for skin breakdown  - Assess and monitor skin integrity  - Assess and monitor nutrition and hydration status  - Monitor labs   - Assess for incontinence   - Turn and reposition patient  - Assist with mobility/ambulation  - Relieve pressure over bony prominences  - Avoid friction and shearing  - Provide appropriate hygiene as needed including keeping skin clean and dry  - Evaluate need for skin moisturizer/barrier cream  - Collaborate with interdisciplinary team   - Patient/family teaching  - Consider wound care consult   Outcome: Progressing     Problem: MOBILITY - ADULT  Goal: Maintain or return to baseline ADL function  Description: INTERVENTIONS:  -  Assess patient's ability to carry out ADLs; assess patient's baseline for ADL function and identify physical deficits which impact ability to perform ADLs (bathing, care of mouth/teeth, toileting, grooming, dressing, etc )  - Assess/evaluate cause of self-care deficits   - Assess range of motion  - Assess patient's mobility; develop plan if impaired  - Assess patient's need for assistive devices and provide as appropriate  - Encourage maximum independence but intervene and supervise when necessary  - Involve family in performance of ADLs  - Assess for home care needs following discharge   - Consider OT consult to assist with ADL evaluation and planning for discharge  - Provide patient education as appropriate  Outcome: Progressing  Goal: Maintains/Returns to pre admission functional level  Description: INTERVENTIONS:  - Perform BMAT or MOVE assessment daily    - Set and communicate daily mobility goal to care team and patient/family/caregiver  - Collaborate with rehabilitation services on mobility goals if consulted  - Perform Range of Motion 3 times a day    - Reposition patient every 2 hours   - Dangle patient 3 times a day  - Stand patient 3 times a day  - Out of bed for toileting  - Record patient progress and toleration of activity level   Outcome: Progressing     Problem: Potential for Falls  Goal: Patient will remain free of falls  Description: INTERVENTIONS:  - Educate patient/family on patient safety including physical limitations  - Instruct patient to call for assistance with activity   - Consult OT/PT to assist with strengthening/mobility   - Keep Call bell within reach  - Keep bed low and locked with side rails adjusted as appropriate  - Keep care items and personal belongings within reach  - Initiate and maintain comfort rounds  - Make Fall Risk Sign visible to staff  - Offer Toileting every 2 Hours, in advance of need  - Initiate/Maintain bed alarm  - Obtain necessary fall risk management equipment  - Apply yellow socks and bracelet for high fall risk patients  - Consider moving patient to room near nurses station  Outcome: Progressing     Problem: PAIN - ADULT  Goal: Verbalizes/displays adequate comfort level or baseline comfort level  Description: Interventions:  - Encourage patient to monitor pain and request assistance  - Assess pain using appropriate pain scale  - Administer analgesics based on type and severity of pain and evaluate response  - Implement non-pharmacological measures as appropriate and evaluate response  - Consider cultural and social influences on pain and pain management  - Notify physician/advanced practitioner if interventions unsuccessful or patient reports new pain  Outcome: Progressing     Problem: INFECTION - ADULT  Goal: Absence or prevention of progression during hospitalization  Description: INTERVENTIONS:  - Assess and monitor for signs and symptoms of infection  - Monitor lab/diagnostic results  - Monitor all insertion sites, i e  indwelling lines, tubes, and drains  - Monitor endotracheal if appropriate and nasal secretions for changes in amount and color  - [place] appropriate cooling/warming therapies per order  - Administer medications as ordered  - Instruct and encourage patient and family to use good hand hygiene technique  - Identify and instruct in appropriate isolation precautions for identified infection/condition  Outcome: Progressing  Goal: Absence of fever/infection during neutropenic period  Description: INTERVENTIONS:  - Monitor WBC    Outcome: Progressing     Problem: SAFETY ADULT  Goal: Maintain or return to baseline ADL function  Description: INTERVENTIONS:  -  Assess patient's ability to carry out ADLs; assess patient's baseline for ADL function and identify physical deficits which impact ability to perform ADLs (bathing, care of mouth/teeth, toileting, grooming, dressing, etc )  - Assess/evaluate cause of self-care deficits   - Assess range of motion  - Assess patient's mobility; develop plan if impaired  - Assess patient's need for assistive devices and provide as appropriate  - Encourage maximum independence but intervene and supervise when necessary  - Involve family in performance of ADLs  - Assess for home care needs following discharge   - Consider OT consult to assist with ADL evaluation and planning for discharge  - Provide patient education as appropriate  Outcome: Progressing  Goal: Maintains/Returns to pre admission functional level  Description: INTERVENTIONS:  - Perform BMAT or MOVE assessment daily    - Set and communicate daily mobility goal to care team and patient/family/caregiver  - Collaborate with rehabilitation services on mobility goals if consulted  - Perform Range of Motion 3 times a day  - Reposition patient every 2 hours    - Dangle patient 3 times a day  - Stand patient 3 times a day  - Out of bed for toileting  - Record patient progress and toleration of activity level   Outcome: Progressing  Goal: Patient will remain free of falls  Description: INTERVENTIONS:  - Educate patient/family on patient safety including physical limitations  - Instruct patient to call for assistance with activity   - Consult OT/PT to assist with strengthening/mobility   - Keep Call bell within reach  - Keep bed low and locked with side rails adjusted as appropriate  - Keep care items and personal belongings within reach  - Initiate and maintain comfort rounds  - Make Fall Risk Sign visible to staff  - Offer Toileting every 2 Hours, in advance of need  - Initiate/Maintain bed alarm  - Obtain necessary fall risk management equipment  - Apply yellow socks and bracelet for high fall risk patients  - Consider moving patient to room near nurses station  Outcome: Progressing     Problem: DISCHARGE PLANNING  Goal: Discharge to home or other facility with appropriate resources  Description: INTERVENTIONS:  - Identify barriers to discharge w/patient and caregiver  - Arrange for needed discharge resources and transportation as appropriate  - Identify discharge learning needs (meds, wound care, etc )  - Arrange for interpretive services to assist at discharge as needed  - Refer to Case Management Department for coordinating discharge planning if the patient needs post-hospital services based on physician/advanced practitioner order or complex needs related to functional status, cognitive ability, or social support system  Outcome: Progressing     Problem: Knowledge Deficit  Goal: Patient/family/caregiver demonstrates understanding of disease process, treatment plan, medications, and discharge instructions  Description: Complete learning assessment and assess knowledge base    Interventions:  - Provide teaching at level of understanding  - Provide teaching via preferred learning methods  Outcome: Progressing     Problem: GASTROINTESTINAL - ADULT  Goal: Minimal or absence of nausea and/or vomiting  Description: INTERVENTIONS:  - Administer IV fluids if ordered to ensure adequate hydration  - Maintain NPO status until nausea and vomiting are resolved  - Nasogastric tube if ordered  - Administer ordered antiemetic medications as needed  - Provide nonpharmacologic comfort measures as appropriate  - Advance diet as tolerated, if ordered  - Consider nutrition services referral to assist patient with adequate nutrition and appropriate food choices  Outcome: Progressing  Goal: Maintains or returns to baseline bowel function  Description: INTERVENTIONS:  - Assess bowel function  - Encourage oral fluids to ensure adequate hydration  - Administer IV fluids if ordered to ensure adequate hydration  - Administer ordered medications as needed  - Encourage mobilization and activity  - Consider nutritional services referral to assist patient with adequate nutrition and appropriate food choices  Outcome: Progressing  Goal: Maintains adequate nutritional intake  Description: INTERVENTIONS:  - Monitor percentage of each meal consumed  - Identify factors contributing to decreased intake, treat as appropriate  - Assist with meals as needed  - Monitor I&O, weight, and lab values if indicated  - Obtain nutrition services referral as needed  Outcome: Progressing  Goal: Establish and maintain optimal ostomy function  Description: INTERVENTIONS:  - Assess bowel function  - Encourage oral fluids to ensure adequate hydration  - Administer IV fluids if ordered to ensure adequate hydration   - Administer ordered medications as needed  - Encourage mobilization and activity  - Nutrition services referral to assist patient with appropriate food choices  - Assess stoma site  - Consider wound care consult   Outcome: Progressing  Goal: Oral mucous membranes remain intact  Description: INTERVENTIONS  - Assess oral mucosa and hygiene practices  - Implement preventative oral hygiene regimen  - Implement oral medicated treatments as ordered  - Initiate Nutrition services referral as needed  Outcome: Progressing

## 2022-06-20 NOTE — CASE MANAGEMENT
Case Management Progress Note    Patient name Miguelito Garcia  Location [facility] /[facility]5 -* MRN [id number]  : 1945 Date 2022       LOS (days): 4  Geometric Mean LOS (GMLOS) (days): 4 40  Days to GMLOS:0 7        OBJECTIVE:        Current admission status: Inpatient  Preferred Pharmacy:   [facility] [address] [place] [id number]  Phone: [phone] Fax: [phone]    Primary Care Provider: Sarina Yang MD    Primary Insurance: [facility]  Secondary Insurance:     PROGRESS NOTE:    CM received tc from [address] [place] [address] they are able to accept Pt but not with TPN  IVs for fluids or medications are  Acceptable  CM reached out to [facility] to determine Pts discharge status  CM made tc to Pts sister Leon Mallory to discuss Pts dcp-Vida states that she is first on Pts emergency contact list, but she lives in [place]  Leon Mallory identified sister Lenore Contreras [phone] as primary person to discuss facilities- as she lives in [place]  And Pts brother Angelica Szymanski, who is Pts POA  Several minutes spent providing Leon Mallory with information about insurance coverage and LTC options once STR is no longer available as a benefit  Leon Mallory states she will notify her brother Angelica Szymanski of this CM's call, with plan to have Davon call this CM  CM will contact sister Kenia to discuss facility choices, after speaking with Brother Angelica Uribey  CM continues to follow  NUMBER LISTED IN THIS NOTE IS DISCONNECTED

## 2022-06-20 NOTE — PHYSICAL THERAPY NOTE
06/20/22 1327   PT Last Visit   PT Visit Date 06/20/22   Note Type   Note Type Cancelled Session   Cancel Reasons Other  (pt refused PT treatment)     Physical Therapy Cancellation Note    Chart review performed  At this time, PT treatment session cancelled , patient declined PT session, reports she was up really early and upset about family matters  Provided emotional support  PT will follow and provide PT interventions as appropriate      Tammie Marinelli, PTA

## 2022-06-20 NOTE — ASSESSMENT & PLAN NOTE
Likely related to poor oral intake  TPN initiated today  On previous admission urine osmolality 190, urine sodium 25  Will trend BMP

## 2022-06-20 NOTE — ASSESSMENT & PLAN NOTE
This is a 68-year-old female with history of Savage-en-Y gastric bypass, symptomatic anemia, gastric ulcer, hypothyroidism, depression initially admitted on 06/11/2022 to [facility] for hematemesis and blood per rectum Secondary to marginal/anastomotic ulcer status post EGD x2  · Status post total 6 unit PRBCs  · Monitor H&H Q 24 hours, has been stable over the last 24 hours  · Transfuse further as needed to maintain greater than 7  · Appreciate GI, bariatric recommendations

## 2022-06-20 NOTE — PLAN OF CARE
Problem: Prexisting or High Potential for Compromised Skin Integrity  Goal: Skin integrity is maintained or improved  Description: INTERVENTIONS:  - Identify patients at risk for skin breakdown  - Assess and monitor skin integrity  - Assess and monitor nutrition and hydration status  - Monitor labs   - Assess for incontinence   - Turn and reposition patient  - Assist with mobility/ambulation  - Relieve pressure over bony prominences  - Avoid friction and shearing  - Provide appropriate hygiene as needed including keeping skin clean and dry  - Evaluate need for skin moisturizer/barrier cream  - Collaborate with interdisciplinary team   - Patient/family teaching  - Consider wound care consult   Outcome: Progressing     Problem: MOBILITY - ADULT  Goal: Maintain or return to baseline ADL function  Description: INTERVENTIONS:  -  Assess patient's ability to carry out ADLs; assess patient's baseline for ADL function and identify physical deficits which impact ability to perform ADLs (bathing, care of mouth/teeth, toileting, grooming, dressing, etc )  - Assess/evaluate cause of self-care deficits   - Assess range of motion  - Assess patient's mobility; develop plan if impaired  - Assess patient's need for assistive devices and provide as appropriate  - Encourage maximum independence but intervene and supervise when necessary  - Involve family in performance of ADLs  - Assess for home care needs following discharge   - Consider OT consult to assist with ADL evaluation and planning for discharge  - Provide patient education as appropriate  Outcome: Progressing  Goal: Maintains/Returns to pre admission functional level  Description: INTERVENTIONS:  - Perform BMAT or MOVE assessment daily    - Set and communicate daily mobility goal to care team and patient/family/caregiver  - Collaborate with rehabilitation services on mobility goals if consulted  - Perform Range of Motion 3 times a day    - Reposition patient every 2 hours   - Dangle patient 3 times a day  - Stand patient 3 times a day  - Out of bed for toileting  - Record patient progress and toleration of activity level   Outcome: Progressing     Problem: Potential for Falls  Goal: Patient will remain free of falls  Description: INTERVENTIONS:  - Educate patient/family on patient safety including physical limitations  - Instruct patient to call for assistance with activity   - Consult OT/PT to assist with strengthening/mobility   - Keep Call bell within reach  - Keep bed low and locked with side rails adjusted as appropriate  - Keep care items and personal belongings within reach  - Initiate and maintain comfort rounds  - Make Fall Risk Sign visible to staff  - Offer Toileting every 2 Hours, in advance of need  - Initiate/Maintain bed alarm  - Obtain necessary fall risk management equipment  - Apply yellow socks and bracelet for high fall risk patients  - Consider moving patient to room near nurses station  Outcome: Progressing     Problem: PAIN - ADULT  Goal: Verbalizes/displays adequate comfort level or baseline comfort level  Description: Interventions:  - Encourage patient to monitor pain and request assistance  - Assess pain using appropriate pain scale  - Administer analgesics based on type and severity of pain and evaluate response  - Implement non-pharmacological measures as appropriate and evaluate response  - Consider cultural and social influences on pain and pain management  - Notify physician/advanced practitioner if interventions unsuccessful or patient reports new pain  Outcome: Progressing     Problem: INFECTION - ADULT  Goal: Absence or prevention of progression during hospitalization  Description: INTERVENTIONS:  - Assess and monitor for signs and symptoms of infection  - Monitor lab/diagnostic results  - Monitor all insertion sites, i e  indwelling lines, tubes, and drains  - Monitor endotracheal if appropriate and nasal secretions for changes in amount and color  - [place] appropriate cooling/warming therapies per order  - Administer medications as ordered  - Instruct and encourage patient and family to use good hand hygiene technique  - Identify and instruct in appropriate isolation precautions for identified infection/condition  Outcome: Progressing  Goal: Absence of fever/infection during neutropenic period  Description: INTERVENTIONS:  - Monitor WBC    Outcome: Progressing     Problem: SAFETY ADULT  Goal: Maintain or return to baseline ADL function  Description: INTERVENTIONS:  -  Assess patient's ability to carry out ADLs; assess patient's baseline for ADL function and identify physical deficits which impact ability to perform ADLs (bathing, care of mouth/teeth, toileting, grooming, dressing, etc )  - Assess/evaluate cause of self-care deficits   - Assess range of motion  - Assess patient's mobility; develop plan if impaired  - Assess patient's need for assistive devices and provide as appropriate  - Encourage maximum independence but intervene and supervise when necessary  - Involve family in performance of ADLs  - Assess for home care needs following discharge   - Consider OT consult to assist with ADL evaluation and planning for discharge  - Provide patient education as appropriate  Outcome: Progressing  Goal: Maintains/Returns to pre admission functional level  Description: INTERVENTIONS:  - Perform BMAT or MOVE assessment daily    - Set and communicate daily mobility goal to care team and patient/family/caregiver  - Collaborate with rehabilitation services on mobility goals if consulted  - Perform Range of Motion 3 times a day  - Reposition patient every 2 hours    - Dangle patient 3 times a day  - Stand patient 3 times a day  - Out of bed for toileting  - Record patient progress and toleration of activity level   Outcome: Progressing  Goal: Patient will remain free of falls  Description: INTERVENTIONS:  - Educate patient/family on patient safety including physical limitations  - Instruct patient to call for assistance with activity   - Consult OT/PT to assist with strengthening/mobility   - Keep Call bell within reach  - Keep bed low and locked with side rails adjusted as appropriate  - Keep care items and personal belongings within reach  - Initiate and maintain comfort rounds  - Make Fall Risk Sign visible to staff  - Offer Toileting every 2 Hours, in advance of need  - Initiate/Maintain bed alarm  - Obtain necessary fall risk management equipment  - Apply yellow socks and bracelet for high fall risk patients  - Consider moving patient to room near nurses station  Outcome: Progressing     Problem: DISCHARGE PLANNING  Goal: Discharge to home or other facility with appropriate resources  Description: INTERVENTIONS:  - Identify barriers to discharge w/patient and caregiver  - Arrange for needed discharge resources and transportation as appropriate  - Identify discharge learning needs (meds, wound care, etc )  - Arrange for interpretive services to assist at discharge as needed  - Refer to Case Management Department for coordinating discharge planning if the patient needs post-hospital services based on physician/advanced practitioner order or complex needs related to functional status, cognitive ability, or social support system  Outcome: Progressing     Problem: Knowledge Deficit  Goal: Patient/family/caregiver demonstrates understanding of disease process, treatment plan, medications, and discharge instructions  Description: Complete learning assessment and assess knowledge base    Interventions:  - Provide teaching at level of understanding  - Provide teaching via preferred learning methods  Outcome: Progressing     Problem: GASTROINTESTINAL - ADULT  Goal: Minimal or absence of nausea and/or vomiting  Description: INTERVENTIONS:  - Administer IV fluids if ordered to ensure adequate hydration  - Maintain NPO status until nausea and vomiting are resolved  - Nasogastric tube if ordered  - Administer ordered antiemetic medications as needed  - Provide nonpharmacologic comfort measures as appropriate  - Advance diet as tolerated, if ordered  - Consider nutrition services referral to assist patient with adequate nutrition and appropriate food choices  Outcome: Progressing  Goal: Maintains or returns to baseline bowel function  Description: INTERVENTIONS:  - Assess bowel function  - Encourage oral fluids to ensure adequate hydration  - Administer IV fluids if ordered to ensure adequate hydration  - Administer ordered medications as needed  - Encourage mobilization and activity  - Consider nutritional services referral to assist patient with adequate nutrition and appropriate food choices  Outcome: Progressing  Goal: Maintains adequate nutritional intake  Description: INTERVENTIONS:  - Monitor percentage of each meal consumed  - Identify factors contributing to decreased intake, treat as appropriate  - Assist with meals as needed  - Monitor I&O, weight, and lab values if indicated  - Obtain nutrition services referral as needed  Outcome: Progressing  Goal: Establish and maintain optimal ostomy function  Description: INTERVENTIONS:  - Assess bowel function  - Encourage oral fluids to ensure adequate hydration  - Administer IV fluids if ordered to ensure adequate hydration   - Administer ordered medications as needed  - Encourage mobilization and activity  - Nutrition services referral to assist patient with appropriate food choices  - Assess stoma site  - Consider wound care consult   Outcome: Progressing  Goal: Oral mucous membranes remain intact  Description: INTERVENTIONS  - Assess oral mucosa and hygiene practices  - Implement preventative oral hygiene regimen  - Implement oral medicated treatments as ordered  - Initiate Nutrition services referral as needed  Outcome: Progressing

## 2022-06-20 NOTE — ASSESSMENT & PLAN NOTE
· Continue Klonopin  · Seen by neuropsychiatry 6/13 deemed patient not to have capacity to make decisions

## 2022-06-20 NOTE — ASSESSMENT & PLAN NOTE
Malnutrition Findings:      BMI Findings: Body mass index is 21 72 kg/m²       TPN initiated  Calorie count

## 2022-06-20 NOTE — PROGRESS NOTES
[facility]  Progress Note - Chon Montez 1945, 64 y o  female MRN: [id number]  Unit/Bed#: E5 -01 Encounter: [id number]  Primary Care Provider: Dion Polanco MD   Date and time admitted to hospital: 6/16/2022  9:16 PM    Gastric ulcer  Assessment & Plan  ·  revealed single large deep irregular benign appearing ulcer gastrojejunal anastomoses with adherent clot status post 2 clips, injected epinephrine and hemostasis achieved  · Emergent EGD 06/15/2022 revealed single large deep ulcer in gastrojejunal anastomosis with nonbleeding visible vessel, induced coagulation and hemostasis achieved with cautery and epinephrine injection  · Bariatric surgery follow-up appreciated  · GI follow-up appreciated  · Transition Protonix drip to Protonix b i d   · PICC line placement for TPN  · Calorie count    Bilateral leg edema  Assessment & Plan  · Lower extremity Doppler negative for DVT  · Likely due to low albumin and 3rd spacing  · Elevate when resting  · Consider compression stockings    Severe protein-calorie malnutrition (HCC)  Assessment & Plan  Malnutrition Findings:      BMI Findings: Body mass index is 21 72 kg/m²       TPN initiated  Calorie count    Ambulatory dysfunction  Assessment & Plan  Appreciate PT/OT recommendations    H/O bariatric surgery - bypass  Assessment & Plan  · 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 at [facility]  · Bariatric surgery follow-up appreciated    Hyponatremia  Assessment & Plan  Likely related to poor oral intake  TPN initiated today  On previous admission urine osmolality 190, urine sodium 25  Will trend BMP    Depression  Assessment & Plan  · Continue Klonopin  · Seen by neuropsychiatry 6/13 deemed patient not to have capacity to make decisions    Hypothyroidism  Assessment & Plan  · Continue Synthroid    * Acute blood loss anemia  Assessment & Plan  This is a 40-year-old female with history of Savage-en-Y gastric bypass, symptomatic anemia, gastric ulcer, hypothyroidism, depression initially admitted on 2022 to [facility] for hematemesis and blood per rectum Secondary to marginal/anastomotic ulcer status post EGD x2  · Status post total 6 unit PRBCs  · Monitor H&H Q 24 hours, has been stable over the last 24 hours  · Transfuse further as needed to maintain greater than 7  · Appreciate GI, bariatric recommendations    VTE Pharmacologic Prophylaxis:  On hold due to GI bleeding    Patient Centered Rounds:  Patient care rounds were performed with nursing    Discussions with Specialists or Other Care Team Provider:  Bariatric  Education and Discussions with Family / Patient:  Updated sister    Time Spent for Care: 30  More than 50% of total time spent on counseling and coordination of care as described above  Current Length of Stay: 4 day(s)    Current Patient Status: Inpatient   Certification Statement: The patient will continue to require additional inpatient hospital stay due to need for IV medications    Discharge Plan:  Pending stability of hemoglobin, electrolytes on TPN    Code Status: Level 1 - Full Code      Subjective:   Patient seen and evaluated at bedside  No overnight events  Denies any chest pain, shortness of breath, abdominal pain    Objective:     Vitals:   Temp (24hrs), Av 9 °F (36 6 °C), Min:97 1 °F (36 2 °C), Max:98 6 °F (37 °C)    Temp:  [97 1 °F (36 2 °C)-98 6 °F (37 °C)] 98 6 °F (37 °C)  HR:  [74-83] 78  Resp:  [18] 18  BP: (108-115)/(53-59) 114/53  SpO2:  [95 %-99 %] 98 %  Body mass index is 21 72 kg/m²  Input and Output Summary (last 24 hours): Intake/Output Summary (Last 24 hours) at 2022 1457  Last data filed at 2022 1436  Gross per 24 hour   Intake 540 ml   Output --   Net 540 ml       Physical Exam:     Physical Exam  Vitals reviewed  Constitutional:       General: She is not in acute distress  Appearance: She is well-developed   She is not ill-appearing, toxic-appearing or diaphoretic  HENT:      Head: Normocephalic and atraumatic  Mouth/Throat:      Pharynx: No oropharyngeal exudate  Eyes:      General: No scleral icterus  Conjunctiva/sclera: Conjunctivae normal    Cardiovascular:      Rate and Rhythm: Normal rate and regular rhythm  Heart sounds: Normal heart sounds  Pulmonary:      Effort: Pulmonary effort is normal  No respiratory distress  Breath sounds: Normal breath sounds  No wheezing or rales  Abdominal:      General: There is no distension  Palpations: Abdomen is soft  Tenderness: There is no abdominal tenderness  There is no guarding or rebound  Musculoskeletal:         General: No swelling, tenderness or deformity  Skin:     General: Skin is warm and dry  Neurological:      General: No focal deficit present  Mental Status: She is alert  Mental status is at baseline  Psychiatric:         Mood and Affect: Mood normal          Behavior: Behavior normal          Thought Content: Thought content normal          Judgment: Judgment normal          Additional Data:     Labs: I have reviewed pertinent results     Results from last 7 days   Lab Units 06/20/22  0747   WBC Thousand/uL 9 84   HEMOGLOBIN g/dL 8 0*   HEMATOCRIT % 25 0*   PLATELETS Thousands/uL 489*   NEUTROS PCT % 59   LYMPHS PCT % 19   MONOS PCT % 11   EOS PCT % 10*     Results from last 7 days   Lab Units 06/20/22  0747 06/18/22  0624 06/17/22  0536   SODIUM mmol/L 130*   < > 138   POTASSIUM mmol/L 4 7   < > 3 0*   CHLORIDE mmol/L 101   < > 106   CO2 mmol/L 18*   < > 25   BUN mg/dL 16   < > 22   CREATININE mg/dL 1 13   < > 1 17   ANION GAP mmol/L 11   < > 7   CALCIUM mg/dL 7 7*   < > 7 6*   ALBUMIN g/dL  --   --  2 0*   TOTAL BILIRUBIN mg/dL  --   --  0 56   ALK PHOS U/L  --   --  38*   ALT U/L  --   --  12   AST U/L  --   --  16   GLUCOSE RANDOM mg/dL 55*   < > 71    < > = values in this interval not displayed       Results from last 7 days   Lab Units 06/16/22  0437   INR  1 36*     Results from last 7 days   Lab Units 06/16/22  2202 06/16/22  0747   POC GLUCOSE mg/dl 115 173*         Results from last 7 days   Lab Units 06/15/22  2344 06/15/22  2107   LACTIC ACID mmol/L 1 8 3 6*         Imaging: I have reviewed pertinent imaging       Recent Cultures (last 7 days):           Last 24 Hours Medication List:   Current Facility-Administered Medications   Medication Dose Route Frequency Provider Last Rate    acetaminophen  650 mg Oral Q6H PRN Karly Dumont MD      Adult TPN (STANDARD BASE/STANDARD ELECTROLYTE)   Intravenous Continuous TPN Jovita Hylton DO      clonazePAM  1 mg Oral HS Karly Dumont MD      [START ON 2/87/4757] folic acid  1 mg Oral Daily Yohan Maddox DO      levothyroxine  112 mcg Oral Early Morning Karly Dumont MD      misoprostol  200 mcg Oral 4x Daily (with meals and at bedtime) Karly Dumont MD      multi-electrolyte  75 mL/hr Intravenous Continuous Karly Dumont MD 75 mL/hr (06/20/22 0904)    ondansetron  4 mg Intravenous Q4H PRN Karly Dumont MD      pantoprazole  40 mg Oral BID AC Yohan Maddox DO      potassium chloride  40 mEq Oral BID Karly Dumont MD      sucralfate  1 g Oral 4x Daily (AC & HS) MD Richard Brandt Chi ON 6/21/2022] thiamine  100 mg Oral Daily Yohan Maddox DO          Today, Patient Was Seen By: Yohan Maddox DO    ** Please Note: Dictation voice to text software may have been used in the creation of this document   **

## 2022-06-20 NOTE — ASSESSMENT & PLAN NOTE
·  revealed single large deep irregular benign appearing ulcer gastrojejunal anastomoses with adherent clot status post 2 clips, injected epinephrine and hemostasis achieved  · Emergent EGD 06/15/2022 revealed single large deep ulcer in gastrojejunal anastomosis with nonbleeding visible vessel, induced coagulation and hemostasis achieved with cautery and epinephrine injection  · Bariatric surgery follow-up appreciated  · GI follow-up appreciated  · Transition Protonix drip to Protonix b i d   · PICC line placement for TPN  · Calorie count

## 2022-06-21 LAB
ANION GAP SERPL CALCULATED.3IONS-SCNC: 4 MMOL/L (ref 4–13)
BUN SERPL-MCNC: 11 MG/DL (ref 5–25)
CALCIUM SERPL-MCNC: 7.5 MG/DL (ref 8.3–10.1)
CHLORIDE SERPL-SCNC: 103 MMOL/L (ref 100–108)
CO2 SERPL-SCNC: 27 MMOL/L (ref 21–32)
CREAT SERPL-MCNC: 1.08 MG/DL (ref 0.6–1.3)
ERYTHROCYTE [DISTWIDTH] IN BLOOD BY AUTOMATED COUNT: 18 % (ref 11.6–15.1)
GFR SERPL CREATININE-BSD FRML MDRD: 49 ML/MIN/1.73SQ M
GLUCOSE SERPL-MCNC: 146 MG/DL (ref 65–140)
HCT VFR BLD AUTO: 21.4 % (ref 34.8–46.1)
HGB BLD-MCNC: 7 G/DL (ref 11.5–15.4)
MAGNESIUM SERPL-MCNC: 1.8 MG/DL (ref 1.6–2.6)
MCH RBC QN AUTO: 30 PG (ref 26.8–34.3)
MCHC RBC AUTO-ENTMCNC: 32.7 G/DL (ref 31.4–37.4)
MCV RBC AUTO: 92 FL (ref 82–98)
PHOSPHATE SERPL-MCNC: 2.5 MG/DL (ref 2.3–4.1)
PLATELET # BLD AUTO: 442 THOUSANDS/UL (ref 149–390)
PMV BLD AUTO: 9.4 FL (ref 8.9–12.7)
POTASSIUM SERPL-SCNC: 4 MMOL/L (ref 3.5–5.3)
RBC # BLD AUTO: 2.33 MILLION/UL (ref 3.81–5.12)
SODIUM SERPL-SCNC: 134 MMOL/L (ref 136–145)
WBC # BLD AUTO: 7.98 THOUSAND/UL (ref 4.31–10.16)

## 2022-06-21 PROCEDURE — 84100 ASSAY OF PHOSPHORUS: CPT | Performed by: SURGERY

## 2022-06-21 PROCEDURE — 80048 BASIC METABOLIC PNL TOTAL CA: CPT | Performed by: INTERNAL MEDICINE

## 2022-06-21 PROCEDURE — 97530 THERAPEUTIC ACTIVITIES: CPT

## 2022-06-21 PROCEDURE — 99232 SBSQ HOSP IP/OBS MODERATE 35: CPT | Performed by: INTERNAL MEDICINE

## 2022-06-21 PROCEDURE — 97110 THERAPEUTIC EXERCISES: CPT

## 2022-06-21 PROCEDURE — 83735 ASSAY OF MAGNESIUM: CPT | Performed by: SURGERY

## 2022-06-21 PROCEDURE — 99232 SBSQ HOSP IP/OBS MODERATE 35: CPT | Performed by: SURGERY

## 2022-06-21 PROCEDURE — 85027 COMPLETE CBC AUTOMATED: CPT | Performed by: INTERNAL MEDICINE

## 2022-06-21 PROCEDURE — 97116 GAIT TRAINING THERAPY: CPT

## 2022-06-21 RX ADMIN — POTASSIUM CHLORIDE 40 MEQ: 20 TABLET, EXTENDED RELEASE ORAL at 08:11

## 2022-06-21 RX ADMIN — SUCRALFATE 1 G: 1 TABLET ORAL at 21:46

## 2022-06-21 RX ADMIN — THIAMINE HCL TAB 100 MG 100 MG: 100 TAB at 08:11

## 2022-06-21 RX ADMIN — CLONAZEPAM 1 MG: 1 TABLET ORAL at 23:56

## 2022-06-21 RX ADMIN — POTASSIUM CHLORIDE 40 MEQ: 20 TABLET, EXTENDED RELEASE ORAL at 17:09

## 2022-06-21 RX ADMIN — SUCRALFATE 1 G: 1 TABLET ORAL at 06:10

## 2022-06-21 RX ADMIN — PANTOPRAZOLE SODIUM 40 MG: 40 TABLET, DELAYED RELEASE ORAL at 06:10

## 2022-06-21 RX ADMIN — FOLIC ACID 1 MG: 1 TABLET ORAL at 08:11

## 2022-06-21 RX ADMIN — SUCRALFATE 1 G: 1 TABLET ORAL at 11:42

## 2022-06-21 RX ADMIN — MISOPROSTOL 200 MCG: 200 TABLET ORAL at 08:11

## 2022-06-21 RX ADMIN — PANTOPRAZOLE SODIUM 40 MG: 40 TABLET, DELAYED RELEASE ORAL at 16:34

## 2022-06-21 RX ADMIN — SUCRALFATE 1 G: 1 TABLET ORAL at 16:34

## 2022-06-21 RX ADMIN — MISOPROSTOL 200 MCG: 200 TABLET ORAL at 21:46

## 2022-06-21 RX ADMIN — MISOPROSTOL 200 MCG: 200 TABLET ORAL at 11:42

## 2022-06-21 RX ADMIN — LEVOTHYROXINE SODIUM 112 MCG: 112 TABLET ORAL at 06:10

## 2022-06-21 RX ADMIN — MISOPROSTOL 200 MCG: 200 TABLET ORAL at 16:34

## 2022-06-21 RX ADMIN — Medication: at 21:25

## 2022-06-21 NOTE — ASSESSMENT & PLAN NOTE
·  revealed single large deep irregular benign appearing ulcer gastrojejunal anastomoses with adherent clot status post 2 clips, injected epinephrine and hemostasis achieved  · Emergent EGD 06/15/2022 revealed single large deep ulcer in gastrojejunal anastomosis with nonbleeding visible vessel, induced coagulation and hemostasis achieved with cautery and epinephrine injection  · Bariatric surgery follow-up appreciated  · GI follow-up appreciated  · Transitioned from Protonix drip to oral Protonix b i d   · PICC line placed, initiated on TPN   Management per bariatrics   · Calorie count

## 2022-06-21 NOTE — ASSESSMENT & PLAN NOTE
Malnutrition Findings:   Adult Malnutrition type: Chronic illness  BMI Findings: Body mass index is 21 87 kg/m²       TPN initiated  Calorie count

## 2022-06-21 NOTE — ASSESSMENT & PLAN NOTE
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

## 2022-06-21 NOTE — PLAN OF CARE
Problem: PHYSICAL THERAPY ADULT  Goal: Performs mobility at highest level of function for planned discharge setting  See evaluation for individualized goals  Description: Treatment/Interventions: Functional transfer training, LE strengthening/ROM, Elevations, Therapeutic exercise, Endurance training, Cognitive reorientation, Patient/family training, Equipment eval/education, Gait training, Spoke to nursing, OT  Equipment Recommended:  (tbd)       See flowsheet documentation for full assessment, interventions and recommendations  Outcome: Progressing  Note: Prognosis: Good  Problem List: Decreased strength, Decreased endurance, Impaired balance, Decreased mobility, Decreased safety awareness, Impaired judgement  Assessment: Pt seen for PT treatment session this date with interventions consisting of gait training w/ emphasis on improving pt's ability to ambulate level surfaces x 15 ftx2 with CGA provided by therapist with RW, Therapeutic exercise consisting of: AROM 15 reps B LE in sitting position and therapeutic activity consisting of training: bed mobility, supine<>sit transfers and sit<>stand transfers  Pt agreeable to PT treatment session upon arrival, pt found supine in bed w/ HOB elevated, in no apparent distress and responsive  In comparison to previous session, pt with improvements in participation  Post session: pt returned BTB, bed alarm engaged, all needs in reach and RN notified of session findings/recommendations  Continue to recommend post acute rehabilitation services vs HHPT at time of d/c in order to maximize pt's functional independence and safety w/ mobility  Pt continues to be functioning below baseline level  PT will continue to see pt during current hospitalization in order to address the deficits listed above and provide interventions consistent w/ POC in effort to achieve STGs    Barriers to Discharge: Inaccessible home environment, Decreased caregiver support        PT Discharge Recommendation: Post acute rehabilitation services (vs HHPT pending progress)          See flowsheet documentation for full assessment

## 2022-06-21 NOTE — PROGRESS NOTES
Nutrition    TPN goal:  750 mL D30  475 mL 15AA  225 mL 20% lipids  will provide: 1499 Kcal, 71 g protein, 1450 mL fluids    CHO load = 2 7 mg/kg/min

## 2022-06-21 NOTE — PHYSICAL THERAPY NOTE
PHYSICAL THERAPY TREATMENT NOTE  NAME:  Osei Blancas  DATE: 06/21/22    Length Of Stay: 5  Performed at least 2 patient identifiers during session: Name and ID bracelet    TREATMENT:      06/21/22 1406   PT Last Visit   PT Visit Date 06/21/22   Note Type   Note Type Treatment   Pain Assessment   Pain Assessment Tool 0-10   Pain Score No Pain   Restrictions/Precautions   Weight Bearing Precautions Per Order No   Other Precautions Cognitive; Bed Alarm; Chair Alarm;Multiple lines; Fall Risk   General   Chart Reviewed Yes   Cognition   Overall Cognitive Status Impaired   Arousal/Participation Alert; Cooperative   Attention Attends with cues to redirect   Orientation Level Oriented X4   Memory Decreased recall of recent events;Decreased recall of precautions   Following Commands Follows one step commands with increased time or repetition   Comments pt agreeable to pT treatment   Bed Mobility   Supine to Sit 5  Supervision   Additional items HOB elevated; Bedrails; Increased time required   Sit to Supine 5  Supervision   Additional items Bedrails; Increased time required;Verbal cues   Additional Comments pt declined OOB to chair, requested return to bed post treatment   Transfers   Sit to Stand 5  Supervision   Additional items Assist x 1; Increased time required;Verbal cues   Stand to Sit 5  Supervision   Additional items Armrests; Increased time required;Verbal cues   Additional Comments cues for proper hand placement and use of RW   Ambulation/Elevation   Gait pattern Improper Weight shift;Decreased foot clearance; Short stride   Gait Assistance   (CGA)   Additional items Assist x 1;Verbal cues   Assistive Device Rolling walker   Distance 15 ftx2   Ambulation/Elevation Additional Comments pt declined further ambulation distance   Balance   Static Sitting Fair +   Dynamic Sitting Fair   Static Standing Fair -   Dynamic Standing Poor +   Ambulatory Poor +   Endurance Deficit   Endurance Deficit Yes   Activity Tolerance Activity Tolerance Patient limited by fatigue   Nurse Made Aware yes   Exercises   Quad Sets Sitting;15 reps;AROM; Bilateral   Heelslides Sitting;15 reps;AROM; Bilateral   Hip Abduction Sitting;15 reps;AROM; Bilateral   Hip Adduction Sitting;15 reps;AROM; Bilateral   Knee AROM Long Arc Quad Sitting;15 reps;AROM; Bilateral   Ankle Pumps Sitting;15 reps;AROM; Bilateral   Marching Sitting;15 reps;AROM; Bilateral   Assessment   Prognosis Good   Problem List Decreased strength;Decreased endurance; Impaired balance;Decreased mobility; Decreased safety awareness; Impaired judgement   Assessment Pt seen for PT treatment session this date with interventions consisting of gait training w/ emphasis on improving pt's ability to ambulate level surfaces x 15 ftx2 with CGA provided by therapist with RW, Therapeutic exercise consisting of: AROM 15 reps B LE in sitting position and therapeutic activity consisting of training: bed mobility, supine<>sit transfers and sit<>stand transfers  Pt agreeable to PT treatment session upon arrival, pt found supine in bed w/ HOB elevated, in no apparent distress and responsive  In comparison to previous session, pt with improvements in participation  Post session: pt returned BTB, bed alarm engaged, all needs in reach and RN notified of session findings/recommendations  Continue to recommend post acute rehabilitation services vs HHPT at time of d/c in order to maximize pt's functional independence and safety w/ mobility  Pt continues to be functioning below baseline level  PT will continue to see pt during current hospitalization in order to address the deficits listed above and provide interventions consistent w/ POC in effort to achieve STGs  Barriers to Discharge Inaccessible home environment;Decreased caregiver support   Plan   Treatment/Interventions Therapeutic exercise; Endurance training;Patient/family training;Cognitive reorientation; Bed mobility;Gait training;Spoke to nursing   Progress Progressing toward goals   PT Frequency 3-5x/wk   Recommendation   PT Discharge Recommendation Post acute rehabilitation services  (vs HHPT pending progress)   AM-PAC Basic Mobility Inpatient   Turning in Bed Without Bedrails 4   Lying on Back to Sitting on Edge of Flat Bed 3   Moving Bed to Chair 3   Standing Up From Chair 3   Walk in Room 3   Climb 3-5 Stairs 3   Basic Mobility Inpatient Raw Score 19   Basic Mobility Standardized Score 42 48   Highest Level Of Mobility   JH-HLM Goal 6: Walk 10 steps or more   JH-HLM Achieved 6: Walk 10 steps or more   Education   Education Provided Mobility training;Assistive device;Home exercise program   Patient Demonstrates acceptance/verbal understanding;Reinforcement needed   End of Consult   Patient Position at End of Consult Supine;Bed/Chair alarm activated; All needs within reach       The patient's AM-PAC Basic Mobility Inpatient Short Form Raw Score is 19  A Raw score of greater than 16 suggests the patient may benefit from discharge to home  Please also refer to the recommendation of the Physical Therapist for safe discharge planning        Elizabeth Schroeder, PTA,PTA

## 2022-06-21 NOTE — PLAN OF CARE
Problem: PAIN - ADULT  Goal: Verbalizes/displays adequate comfort level or baseline comfort level  Description: Interventions:  - Encourage patient to monitor pain and request assistance  - Assess pain using appropriate pain scale  - Administer analgesics based on type and severity of pain and evaluate response  - Implement non-pharmacological measures as appropriate and evaluate response  - Consider cultural and social influences on pain and pain management  - Notify physician/advanced practitioner if interventions unsuccessful or patient reports new pain  Outcome: Progressing     Problem: DISCHARGE PLANNING  Goal: Discharge to home or other facility with appropriate resources  Description: INTERVENTIONS:  - Identify barriers to discharge w/patient and caregiver  - Arrange for needed discharge resources and transportation as appropriate  - Identify discharge learning needs (meds, wound care, etc )  - Arrange for interpretive services to assist at discharge as needed  - Refer to Case Management Department for coordinating discharge planning if the patient needs post-hospital services based on physician/advanced practitioner order or complex needs related to functional status, cognitive ability, or social support system  Outcome: Progressing     Problem: Knowledge Deficit  Goal: Patient/family/caregiver demonstrates understanding of disease process, treatment plan, medications, and discharge instructions  Description: Complete learning assessment and assess knowledge base    Interventions:  - Provide teaching at level of understanding  - Provide teaching via preferred learning methods  Outcome: Progressing     Problem: GASTROINTESTINAL - ADULT  Goal: Maintains or returns to baseline bowel function  Description: INTERVENTIONS:  - Assess bowel function  - Encourage oral fluids to ensure adequate hydration  - Administer IV fluids if ordered to ensure adequate hydration  - Administer ordered medications as needed  - Encourage mobilization and activity  - Consider nutritional services referral to assist patient with adequate nutrition and appropriate food choices  Outcome: Progressing     Problem: HEMATOLOGIC - ADULT  Goal: Maintains hematologic stability  Description: INTERVENTIONS  - Assess for signs and symptoms of bleeding or hemorrhage  - Monitor labs  - Administer supportive blood products/factors as ordered and appropriate  Outcome: Progressing

## 2022-06-21 NOTE — ASSESSMENT & PLAN NOTE
This is a 66-year-old female with history of Savage-en-Y gastric bypass, symptomatic anemia, gastric ulcer, hypothyroidism, depression initially admitted on 06/11/2022 to [facility] for hematemesis and blood per rectum Secondary to marginal/anastomotic ulcer status post EGD x2  · Status post total 6 unit PRBCs  · Monitor H&H Q 24 hours  · Transfuse further as needed to maintain greater than 7  · Appreciate GI, bariatric recommendations

## 2022-06-21 NOTE — PROGRESS NOTES
[address] [facility]  Progress Note - Tenna Pain 1945, 64 y o  female MRN: [id number]  Unit/Bed#: E5 -01 Encounter: [id number]  Primary Care Provider: Jose Juan Roth MD   Date and time admitted to hospital: 6/16/2022  9:16 PM    Gastric ulcer  Assessment & Plan  ·  revealed single large deep irregular benign appearing ulcer gastrojejunal anastomoses with adherent clot status post 2 clips, injected epinephrine and hemostasis achieved  · Emergent EGD 06/15/2022 revealed single large deep ulcer in gastrojejunal anastomosis with nonbleeding visible vessel, induced coagulation and hemostasis achieved with cautery and epinephrine injection  · Bariatric surgery follow-up appreciated  · GI follow-up appreciated  · Transitioned from Protonix drip to oral Protonix b i d   · PICC line placed, initiated on TPN  Management per bariatrics   · Calorie count    * Acute blood loss anemia  Assessment & Plan  This is a 27-year-old female with history of Savage-en-Y gastric bypass, symptomatic anemia, gastric ulcer, hypothyroidism, depression initially admitted on 06/11/2022 to [facility] for hematemesis and blood per rectum Secondary to marginal/anastomotic ulcer status post EGD x2  · Status post total 6 unit PRBCs  · Monitor H&H Q 24 hours  · Transfuse further as needed to maintain greater than 7  · Appreciate GI, bariatric recommendations    Bilateral leg edema  Assessment & Plan  · Lower extremity Doppler negative for DVT  · Likely due to low albumin and 3rd spacing  · Elevate when resting  · Consider compression stockings    Severe protein-calorie malnutrition (HCC)  Assessment & Plan  Malnutrition Findings:   Adult Malnutrition type: Chronic illness  BMI Findings: Body mass index is 21 87 kg/m²       TPN initiated  Calorie count    Ambulatory dysfunction  Assessment & Plan  Appreciate PT/OT recommendations    H/O bariatric surgery - bypass  Assessment & Plan  · 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 at [facility]  · Bariatric surgery follow-up appreciated    Depression  Assessment & Plan  · Continue Klonopin  · Seen by neuropsychiatry  deemed patient not to have capacity to make decisions    Hypothyroidism  Assessment & Plan  · Continue Synthroid        VTE Pharmacologic Prophylaxis: on hold due to acute blood loss anemia     Patient Centered Rounds:  Patient care rounds were performed with nursing    Education and Discussions with Family / Patient: Called daughter, no answer     Time Spent for Care: 30  More than 50% of total time spent on counseling and coordination of care as described above  Current Length of Stay: 5 day(s)    Current Patient Status: Inpatient   Certification Statement: The patient will continue to require additional inpatient hospital stay due to ongoing monitoring of acute blood loss anemia     Discharge Plan: discharge when hemoglobin stable and TPN set up for outpatient     Code Status: Level 1 - Full Code      Subjective:   Patient seen and evaluated at bedside  No melena overnight  She feels improved on TPN    Objective:     Vitals:   Temp (24hrs), Av 2 °F (36 8 °C), Min:97 7 °F (36 5 °C), Max:98 6 °F (37 °C)    Temp:  [97 7 °F (36 5 °C)-98 6 °F (37 °C)] 98 6 °F (37 °C)  HR:  [75-86] 86  Resp:  [18] 18  BP: (111-117)/(58-60) 117/59  SpO2:  [96 %-98 %] 98 %  Body mass index is 21 87 kg/m²  Input and Output Summary (last 24 hours): Intake/Output Summary (Last 24 hours) at 2022 1636  Last data filed at 2022 1300  Gross per 24 hour   Intake 660 ml   Output --   Net 660 ml       Physical Exam:     Physical Exam  Vitals reviewed  Constitutional:       General: She is not in acute distress  Appearance: She is well-developed  She is not ill-appearing, toxic-appearing or diaphoretic  Comments: Thin appearing    HENT:      Head: Normocephalic and atraumatic        Mouth/Throat:      Pharynx: No oropharyngeal exudate  Eyes:      General: No scleral icterus  Conjunctiva/sclera: Conjunctivae normal    Cardiovascular:      Rate and Rhythm: Normal rate and regular rhythm  Heart sounds: Normal heart sounds  Pulmonary:      Effort: Pulmonary effort is normal  No respiratory distress  Breath sounds: Normal breath sounds  No wheezing or rales  Abdominal:      General: There is no distension  Palpations: Abdomen is soft  Tenderness: There is no abdominal tenderness  There is no guarding or rebound  Musculoskeletal:         General: No swelling, tenderness or deformity  Skin:     General: Skin is warm and dry  Neurological:      General: No focal deficit present  Mental Status: She is alert  Mental status is at baseline  Psychiatric:         Mood and Affect: Mood normal          Behavior: Behavior normal          Thought Content: Thought content normal          Judgment: Judgment normal          Additional Data:     Labs: I have reviewed pertinent results     Results from last 7 days   Lab Units 06/21/22  0525 06/20/22  1857 06/20/22  0747   WBC Thousand/uL 7 98  --  9 84   HEMOGLOBIN g/dL 7 0*   < > 8 0*   HEMATOCRIT % 21 4*   < > 25 0*   PLATELETS Thousands/uL 442*  --  489*   NEUTROS PCT %  --   --  59   LYMPHS PCT %  --   --  19   MONOS PCT %  --   --  11   EOS PCT %  --   --  10*    < > = values in this interval not displayed       Results from last 7 days   Lab Units 06/21/22  0525 06/20/22  1318 06/18/22  0624 06/17/22  0536   SODIUM mmol/L 134*  --    < > 138   POTASSIUM mmol/L 4 0  --    < > 3 0*   CHLORIDE mmol/L 103  --    < > 106   CO2 mmol/L 27  --    < > 25   BUN mg/dL 11  --    < > 22   CREATININE mg/dL 1 08  --    < > 1 17   ANION GAP mmol/L 4  --    < > 7   CALCIUM mg/dL 7 5*  --    < > 7 6*   ALBUMIN g/dL  --  1 8*  --  2 0*   TOTAL BILIRUBIN mg/dL  --   --   --  0 56   ALK PHOS U/L  --   --   --  38*   ALT U/L  --   --   --  12   AST U/L  --   --   --  16   GLUCOSE RANDOM mg/dL 146*  --    < > 71    < > = values in this interval not displayed  Results from last 7 days   Lab Units 06/16/22  0437   INR  1 36*     Results from last 7 days   Lab Units 06/16/22  2202 06/16/22  0747   POC GLUCOSE mg/dl 115 173*         Results from last 7 days   Lab Units 06/15/22  2344 06/15/22  2107   LACTIC ACID mmol/L 1 8 3 6*         Imaging: I have reviewed pertinent imaging       Recent Cultures (last 7 days):           Last 24 Hours Medication List:   Current Facility-Administered Medications   Medication Dose Route Frequency Provider Last Rate    acetaminophen  650 mg Oral Q6H PRN Kristel Griffith MD      Adult TPN (STANDARD BASE/STANDARD ELECTROLYTE)   Intravenous Continuous TPN Jovita Hylton DO 41 6 mL/hr at 06/20/22 2048    Adult TPN (STANDARD BASE/STANDARD ELECTROLYTE)   Intravenous Continuous TPN Jovita Hylton DO      clonazePAM  1 mg Oral HS Kristel Griffith MD      folic acid  1 mg Oral Daily Ishaan Doan DO      levothyroxine  112 mcg Oral Early Morning Kristel Griffith MD      misoprostol  200 mcg Oral 4x Daily (with meals and at bedtime) Kristel Griffith MD      ondansetron  4 mg Intravenous Q4H PRN Kristel Griffith MD      pantoprazole  40 mg Oral BID AC Ishaan Doan DO      potassium chloride  40 mEq Oral BID Kristel Griffith MD      sucralfate  1 g Oral 4x Daily (AC & HS) Kristel Griffith MD      thiamine  100 mg Oral Daily Ishaan Doan DO          Today, Patient Was Seen By: Ishaan Doan DO    ** Please Note: Dictation voice to text software may have been used in the creation of this document   **

## 2022-06-21 NOTE — PROGRESS NOTES
Consultation - Bariatric Surgery   Alanna Gomez 64 y o  female MRN: [id number]  Unit/Bed#: E5 -01 Encounter: [id number]    Assessment/Plan     Assessment:  65 yo female with hx of RNYGB at [facility] in 2012 with hx of known marginal ulcers who presented to [address] [place] [address] with a bleeding marginal ulcer now s/p EGDx2 with GI     Plan:  1  Continue IV protonix  2  Continue to trend H/H q24h  3  Continue CLD at this time  4  Zofran PRN for nausea/vomiting  5  Transfuse for Hgb < 7   6  If patient rebleeds will contact IR for possible interventions before taking the patient to the OR  7  Continue cytotec and carafate  8  Received PICC yesterday  Continue TPN      History of Present Illness     Subjective:  Patient denies overnight events  She states she is sleeping and does not want to be evaluated this AM  Per nursing report, patient had 1 episode of melena yesterday afternoon and no events since then  Hospital Course:  Alanan Gomez is a 64 y o  female Body mass index is 21 87 kg/m²  with history of RNYGB at [facility] in 2012 who presented to [address] [place] [address] ER on 6/11/22 with UGIB  She has undergone two EGDs with GI on 6/12 and 6/15 that showed a large, deep, irregular ulcer at the [facility] anastomosis with nonbleeding visible vessel  Induced coagulation and hemostasis achieved with 2 applications of bipolar cautery, epinephrine injection  Since admission she has received 6 U pRBCs    She was transferred [facility] [place] 6/16/22 for bariatric surgery evaluation  6/18 - no acute events overnight  Her H/H has remained stable for the past 24 hrs (7 -> 7 8 -> 7 3 -> 7 2) without requiring any transfusions  She has remained hemodynamically stable  She denies nausea and vomiting as well as abdominal pain  6/19 - transferred out of the ICU yesterday  H/H continues to remain stable without further transfusions  She denies nausea and vomiting   No bloody bowel movements per nursing    - PICC placed    Consults    Review of Systems   Constitutional: Negative for chills and fever  HENT: Negative for ear pain and sore throat  Eyes: Negative for pain and visual disturbance  Respiratory: Negative for cough and shortness of breath  Cardiovascular: Negative for chest pain and palpitations  Gastrointestinal: Negative for abdominal pain and vomiting  Genitourinary: Negative for dysuria and hematuria  Musculoskeletal: Negative for arthralgias and back pain  Skin: Negative for color change and rash  Neurological: Negative for dizziness, seizures and syncope  All other systems reviewed and are negative        Historical Information   Past Medical History:   Diagnosis Date    Anemia     Anxiety     Bipolar disorder ([address] )     Colon polyp     Disease of thyroid gland     Essential hypertension     Essential tremor     Fibromyalgia, primary     GERD (gastroesophageal reflux disease)     Inflammatory polyarthropathy (HCC)     Mammogram abnormal      Past Surgical History:   Procedure Laterality Date    BREAST BIOPSY Right 2015    benign    CARPAL TUNNEL RELEASE Bilateral     COLONOSCOPY      EGD AND COLONOSCOPY  2014    GASTRIC BYPASS  2012    MAMMO NEEDLE LOCALIZATION RIGHT (ALL INC) Right 2012    MAMMO NEEDLE LOCALIZATION RIGHT (ALL INC) Right 2012    ULNAR NERVE TRANSPOSITION Right      Social History   Social History     Substance and Sexual Activity   Alcohol Use Yes    Alcohol/week: 4 0 standard drinks    Types: 4 Glasses of wine per week    Comment: rare     Social History     Substance and Sexual Activity   Drug Use Never     Social History     Tobacco Use   Smoking Status Former Smoker    Quit date: 12    Years since quittin 4   Smokeless Tobacco Never Used     Family History: Non-contributory      Meds/Allergies   No Known Allergies    Objective   First Vitals:   Blood Pressure: 105/65 (22 2130)  Pulse: 84 (06/16/22 2125)  Temperature: (!) 97 2 °F (36 2 °C) (06/16/22 2130)  Temp Source: Temporal (06/16/22 2130)  Respirations: 20 (06/16/22 2125)  Height: 5' 4" (162 6 cm) (06/16/22 2130)  Weight - Scale: 57 4 kg (126 lb 8 7 oz) (06/16/22 2123)  SpO2: 99 % (06/16/22 2125)    Current Vitals:   Blood Pressure: 111/58 (06/21/22 0712)  Pulse: 84 (06/21/22 0712)  Temperature: 97 7 °F (36 5 °C) (06/21/22 0712)  Temp Source: Oral (06/19/22 0644)  Respirations: 18 (06/21/22 0712)  Height: 5' 4" (162 6 cm) (06/16/22 2130)  Weight - Scale: 57 8 kg (127 lb 6 8 oz) (06/21/22 0600)  SpO2: 97 % (06/21/22 0712)      Intake/Output Summary (Last 24 hours) at 6/21/2022 0741  Last data filed at 6/20/2022 1830  Gross per 24 hour   Intake 840 ml   Output --   Net 840 ml       Invasive Devices  Report    Peripherally Inserted Central Catheter Line  Duration           PICC Line 85/26/92 Right Basilic <1 day          Peripheral Intravenous Line  Duration           Peripheral IV 06/18/22 Dorsal (posterior); Right Hand 2 days          Drain  Duration           External Urinary Catheter 1 day                Physical Exam  Constitutional:       Appearance: Normal appearance  She is normal weight  Comments: Frail appearing    HENT:      Head: Normocephalic and atraumatic  Cardiovascular:      Rate and Rhythm: Normal rate  Pulmonary:      Effort: Pulmonary effort is normal    Abdominal:      General: Abdomen is flat  There is no distension  Palpations: Abdomen is soft  There is no mass  Tenderness: There is no guarding or rebound  Musculoskeletal:         General: Normal range of motion  Skin:     General: Skin is warm and dry  Neurological:      Mental Status: She is alert  Psychiatric:         Mood and Affect: Mood normal          Behavior: Behavior normal        Counseling / Coordination of Care  Total floor / unit time spent today 20 minutes    Greater than 50% of total time was spent with the patient and / or family counseling and / or coordination of care       Plan discussed with Dr Bryce Coronado MD

## 2022-06-21 NOTE — PLAN OF CARE
Problem: Prexisting or High Potential for Compromised Skin Integrity  Goal: Skin integrity is maintained or improved  Description: INTERVENTIONS:  - Identify patients at risk for skin breakdown  - Assess and monitor skin integrity  - Assess and monitor nutrition and hydration status  - Monitor labs   - Assess for incontinence   - Turn and reposition patient  - Assist with mobility/ambulation  - Relieve pressure over bony prominences  - Avoid friction and shearing  - Provide appropriate hygiene as needed including keeping skin clean and dry  - Evaluate need for skin moisturizer/barrier cream  - Collaborate with interdisciplinary team   - Patient/family teaching  - Consider wound care consult   Outcome: Progressing     Problem: MOBILITY - ADULT  Goal: Maintain or return to baseline ADL function  Description: INTERVENTIONS:  -  Assess patient's ability to carry out ADLs; assess patient's baseline for ADL function and identify physical deficits which impact ability to perform ADLs (bathing, care of mouth/teeth, toileting, grooming, dressing, etc )  - Assess/evaluate cause of self-care deficits   - Assess range of motion  - Assess patient's mobility; develop plan if impaired  - Assess patient's need for assistive devices and provide as appropriate  - Encourage maximum independence but intervene and supervise when necessary  - Involve family in performance of ADLs  - Assess for home care needs following discharge   - Consider OT consult to assist with ADL evaluation and planning for discharge  - Provide patient education as appropriate  Outcome: Progressing  Goal: Maintains/Returns to pre admission functional level  Description: INTERVENTIONS:  - Perform BMAT or MOVE assessment daily    - Set and communicate daily mobility goal to care team and patient/family/caregiver  - Collaborate with rehabilitation services on mobility goals if consulted  - Perform Range of Motion 3 times a day    - Reposition patient every 2 hours   - Dangle patient 3 times a day  - Stand patient 3 times a day  - Out of bed for toileting  - Record patient progress and toleration of activity level   Outcome: Progressing     Problem: Potential for Falls  Goal: Patient will remain free of falls  Description: INTERVENTIONS:  - Educate patient/family on patient safety including physical limitations  - Instruct patient to call for assistance with activity   - Consult OT/PT to assist with strengthening/mobility   - Keep Call bell within reach  - Keep bed low and locked with side rails adjusted as appropriate  - Keep care items and personal belongings within reach  - Initiate and maintain comfort rounds  - Make Fall Risk Sign visible to staff  - Offer Toileting every 2 Hours, in advance of need  - Initiate/Maintain bed alarm  - Obtain necessary fall risk management equipment  - Apply yellow socks and bracelet for high fall risk patients  - Consider moving patient to room near nurses station  Outcome: Progressing     Problem: PAIN - ADULT  Goal: Verbalizes/displays adequate comfort level or baseline comfort level  Description: Interventions:  - Encourage patient to monitor pain and request assistance  - Assess pain using appropriate pain scale  - Administer analgesics based on type and severity of pain and evaluate response  - Implement non-pharmacological measures as appropriate and evaluate response  - Consider cultural and social influences on pain and pain management  - Notify physician/advanced practitioner if interventions unsuccessful or patient reports new pain  Outcome: Progressing     Problem: INFECTION - ADULT  Goal: Absence or prevention of progression during hospitalization  Description: INTERVENTIONS:  - Assess and monitor for signs and symptoms of infection  - Monitor lab/diagnostic results  - Monitor all insertion sites, i e  indwelling lines, tubes, and drains  - Monitor endotracheal if appropriate and nasal secretions for changes in amount and color  - Camilla appropriate cooling/warming therapies per order  - Administer medications as ordered  - Instruct and encourage patient and family to use good hand hygiene technique  - Identify and instruct in appropriate isolation precautions for identified infection/condition  Outcome: Progressing  Goal: Absence of fever/infection during neutropenic period  Description: INTERVENTIONS:  - Monitor WBC    Outcome: Progressing     Problem: SAFETY ADULT  Goal: Maintain or return to baseline ADL function  Description: INTERVENTIONS:  -  Assess patient's ability to carry out ADLs; assess patient's baseline for ADL function and identify physical deficits which impact ability to perform ADLs (bathing, care of mouth/teeth, toileting, grooming, dressing, etc )  - Assess/evaluate cause of self-care deficits   - Assess range of motion  - Assess patient's mobility; develop plan if impaired  - Assess patient's need for assistive devices and provide as appropriate  - Encourage maximum independence but intervene and supervise when necessary  - Involve family in performance of ADLs  - Assess for home care needs following discharge   - Consider OT consult to assist with ADL evaluation and planning for discharge  - Provide patient education as appropriate  Outcome: Progressing  Goal: Maintains/Returns to pre admission functional level  Description: INTERVENTIONS:  - Perform BMAT or MOVE assessment daily    - Set and communicate daily mobility goal to care team and patient/family/caregiver  - Collaborate with rehabilitation services on mobility goals if consulted  - Perform Range of Motion 3 times a day  - Reposition patient every 2 hours    - Dangle patient 3 times a day  - Stand patient 3 times a day  - Out of bed for toileting  - Record patient progress and toleration of activity level   Outcome: Progressing  Goal: Patient will remain free of falls  Description: INTERVENTIONS:  - Educate patient/family on patient safety including physical limitations  - Instruct patient to call for assistance with activity   - Consult OT/PT to assist with strengthening/mobility   - Keep Call bell within reach  - Keep bed low and locked with side rails adjusted as appropriate  - Keep care items and personal belongings within reach  - Initiate and maintain comfort rounds  - Make Fall Risk Sign visible to staff  - Offer Toileting every 2 Hours, in advance of need  - Initiate/Maintain bed alarm  - Obtain necessary fall risk management equipment  - Apply yellow socks and bracelet for high fall risk patients  - Consider moving patient to room near nurses station  Outcome: Progressing     Problem: DISCHARGE PLANNING  Goal: Discharge to home or other facility with appropriate resources  Description: INTERVENTIONS:  - Identify barriers to discharge w/patient and caregiver  - Arrange for needed discharge resources and transportation as appropriate  - Identify discharge learning needs (meds, wound care, etc )  - Arrange for interpretive services to assist at discharge as needed  - Refer to Case Management Department for coordinating discharge planning if the patient needs post-hospital services based on physician/advanced practitioner order or complex needs related to functional status, cognitive ability, or social support system  Outcome: Progressing     Problem: Knowledge Deficit  Goal: Patient/family/caregiver demonstrates understanding of disease process, treatment plan, medications, and discharge instructions  Description: Complete learning assessment and assess knowledge base    Interventions:  - Provide teaching at level of understanding  - Provide teaching via preferred learning methods  Outcome: Progressing     Problem: GASTROINTESTINAL - ADULT  Goal: Minimal or absence of nausea and/or vomiting  Description: INTERVENTIONS:  - Administer IV fluids if ordered to ensure adequate hydration  - Maintain NPO status until nausea and vomiting are resolved  - Nasogastric tube if ordered  - Administer ordered antiemetic medications as needed  - Provide nonpharmacologic comfort measures as appropriate  - Advance diet as tolerated, if ordered  - Consider nutrition services referral to assist patient with adequate nutrition and appropriate food choices  Outcome: Progressing  Goal: Maintains or returns to baseline bowel function  Description: INTERVENTIONS:  - Assess bowel function  - Encourage oral fluids to ensure adequate hydration  - Administer IV fluids if ordered to ensure adequate hydration  - Administer ordered medications as needed  - Encourage mobilization and activity  - Consider nutritional services referral to assist patient with adequate nutrition and appropriate food choices  Outcome: Progressing  Goal: Maintains adequate nutritional intake  Description: INTERVENTIONS:  - Monitor percentage of each meal consumed  - Identify factors contributing to decreased intake, treat as appropriate  - Assist with meals as needed  - Monitor I&O, weight, and lab values if indicated  - Obtain nutrition services referral as needed  Outcome: Progressing  Goal: Establish and maintain optimal ostomy function  Description: INTERVENTIONS:  - Assess bowel function  - Encourage oral fluids to ensure adequate hydration  - Administer IV fluids if ordered to ensure adequate hydration   - Administer ordered medications as needed  - Encourage mobilization and activity  - Nutrition services referral to assist patient with appropriate food choices  - Assess stoma site  - Consider wound care consult   Outcome: Progressing  Goal: Oral mucous membranes remain intact  Description: INTERVENTIONS  - Assess oral mucosa and hygiene practices  - Implement preventative oral hygiene regimen  - Implement oral medicated treatments as ordered  - Initiate Nutrition services referral as needed  Outcome: Progressing     Problem: Nutrition/Hydration-ADULT  Goal: Nutrient/Hydration intake appropriate for improving, restoring or maintaining nutritional needs  Description: Monitor and assess patient's nutrition/hydration status for malnutrition  Collaborate with interdisciplinary team and initiate plan and interventions as ordered  Monitor patient's weight and dietary intake as ordered or per policy  Utilize nutrition screening tool and intervene as necessary  Determine patient's food preferences and provide high-protein, high-caloric foods as appropriate       INTERVENTIONS:  - Monitor oral intake, urinary output, labs, and treatment plans  - Assess nutrition and hydration status and recommend course of action  - Evaluate amount of meals eaten  - Assist patient with eating if necessary   - Allow adequate time for meals  - Recommend/ encourage appropriate diets, oral nutritional supplements, and vitamin/mineral supplements  - Order, calculate, and assess calorie counts as needed  - Recommend, monitor, and adjust tube feedings and TPN/PPN based on assessed needs  - Assess need for intravenous fluids  - Provide specific nutrition/hydration education as appropriate  - Include patient/family/caregiver in decisions related to nutrition  Outcome: Progressing

## 2022-06-22 LAB
ANION GAP SERPL CALCULATED.3IONS-SCNC: 6 MMOL/L (ref 4–13)
BUN SERPL-MCNC: 11 MG/DL (ref 5–25)
CALCIUM SERPL-MCNC: 7.4 MG/DL (ref 8.3–10.1)
CHLORIDE SERPL-SCNC: 102 MMOL/L (ref 100–108)
CO2 SERPL-SCNC: 25 MMOL/L (ref 21–32)
CREAT SERPL-MCNC: 1.07 MG/DL (ref 0.6–1.3)
ERYTHROCYTE [DISTWIDTH] IN BLOOD BY AUTOMATED COUNT: 18.2 % (ref 11.6–15.1)
GFR SERPL CREATININE-BSD FRML MDRD: 49 ML/MIN/1.73SQ M
GLUCOSE SERPL-MCNC: 106 MG/DL (ref 65–140)
HCT VFR BLD AUTO: 25.6 % (ref 34.8–46.1)
HGB BLD-MCNC: 7.8 G/DL (ref 11.5–15.4)
MAGNESIUM SERPL-MCNC: 1.9 MG/DL (ref 1.6–2.6)
MCH RBC QN AUTO: 29.7 PG (ref 26.8–34.3)
MCHC RBC AUTO-ENTMCNC: 30.5 G/DL (ref 31.4–37.4)
MCV RBC AUTO: 97 FL (ref 82–98)
PHOSPHATE SERPL-MCNC: 2.6 MG/DL (ref 2.3–4.1)
PLATELET # BLD AUTO: 310 THOUSANDS/UL (ref 149–390)
PMV BLD AUTO: 10.4 FL (ref 8.9–12.7)
POTASSIUM SERPL-SCNC: 4.6 MMOL/L (ref 3.5–5.3)
RBC # BLD AUTO: 2.63 MILLION/UL (ref 3.81–5.12)
SODIUM SERPL-SCNC: 133 MMOL/L (ref 136–145)
WBC # BLD AUTO: 10.8 THOUSAND/UL (ref 4.31–10.16)
ZINC SERPL-MCNC: 72 UG/DL (ref 44–115)

## 2022-06-22 PROCEDURE — 97530 THERAPEUTIC ACTIVITIES: CPT

## 2022-06-22 PROCEDURE — 85027 COMPLETE CBC AUTOMATED: CPT | Performed by: SURGERY

## 2022-06-22 PROCEDURE — 99232 SBSQ HOSP IP/OBS MODERATE 35: CPT | Performed by: INTERNAL MEDICINE

## 2022-06-22 PROCEDURE — 84100 ASSAY OF PHOSPHORUS: CPT | Performed by: SURGERY

## 2022-06-22 PROCEDURE — 99232 SBSQ HOSP IP/OBS MODERATE 35: CPT | Performed by: SURGERY

## 2022-06-22 PROCEDURE — 80048 BASIC METABOLIC PNL TOTAL CA: CPT | Performed by: SURGERY

## 2022-06-22 PROCEDURE — 97535 SELF CARE MNGMENT TRAINING: CPT

## 2022-06-22 PROCEDURE — 83735 ASSAY OF MAGNESIUM: CPT | Performed by: SURGERY

## 2022-06-22 RX ADMIN — SUCRALFATE 1 G: 1 TABLET ORAL at 11:34

## 2022-06-22 RX ADMIN — PANTOPRAZOLE SODIUM 40 MG: 40 TABLET, DELAYED RELEASE ORAL at 17:06

## 2022-06-22 RX ADMIN — MISOPROSTOL 200 MCG: 200 TABLET ORAL at 21:05

## 2022-06-22 RX ADMIN — MISOPROSTOL 200 MCG: 200 TABLET ORAL at 11:34

## 2022-06-22 RX ADMIN — SUCRALFATE 1 G: 1 TABLET ORAL at 05:30

## 2022-06-22 RX ADMIN — THIAMINE HCL TAB 100 MG 100 MG: 100 TAB at 08:37

## 2022-06-22 RX ADMIN — SUCRALFATE 1 G: 1 TABLET ORAL at 21:05

## 2022-06-22 RX ADMIN — Medication: at 21:05

## 2022-06-22 RX ADMIN — LEVOTHYROXINE SODIUM 112 MCG: 112 TABLET ORAL at 05:30

## 2022-06-22 RX ADMIN — POTASSIUM CHLORIDE 40 MEQ: 20 TABLET, EXTENDED RELEASE ORAL at 08:37

## 2022-06-22 RX ADMIN — PANTOPRAZOLE SODIUM 40 MG: 40 TABLET, DELAYED RELEASE ORAL at 05:30

## 2022-06-22 RX ADMIN — FOLIC ACID 1 MG: 1 TABLET ORAL at 08:37

## 2022-06-22 RX ADMIN — POTASSIUM CHLORIDE 40 MEQ: 20 TABLET, EXTENDED RELEASE ORAL at 17:06

## 2022-06-22 RX ADMIN — CLONAZEPAM 1 MG: 1 TABLET ORAL at 21:05

## 2022-06-22 RX ADMIN — SUCRALFATE 1 G: 1 TABLET ORAL at 17:06

## 2022-06-22 RX ADMIN — ONDANSETRON 4 MG: 2 INJECTION INTRAMUSCULAR; INTRAVENOUS at 11:36

## 2022-06-22 RX ADMIN — MISOPROSTOL 200 MCG: 200 TABLET ORAL at 17:06

## 2022-06-22 RX ADMIN — MISOPROSTOL 200 MCG: 200 TABLET ORAL at 07:41

## 2022-06-22 NOTE — ASSESSMENT & PLAN NOTE
This is a 68-year-old female with history of Savage-en-Y gastric bypass, symptomatic anemia, gastric ulcer, hypothyroidism, depression initially admitted on 06/11/2022 to [facility] for hematemesis and blood per rectum Secondary to marginal/anastomotic ulcer status post EGD x2  · Status post total 6 unit PRBCs  · Hemoglobin stable  · Transfuse further as needed to maintain greater than 7  · Appreciate GI, bariatric recommendations

## 2022-06-22 NOTE — OCCUPATIONAL THERAPY NOTE
Occupational Therapy Progress Note     Patient Name: Radha MUELLER Date: 6/22/2022  Problem List  Principal Problem:    Acute blood loss anemia  Active Problems:    Hypothyroidism    Depression    Hyponatremia    H/O bariatric surgery - bypass    Gastric ulcer    Ambulatory dysfunction    Neutrophilic leukocytosis    Hypocalcemia    Severe protein-calorie malnutrition (HCC)    Bilateral leg edema            06/22/22 1128   OT Last Visit   OT Visit Date 06/22/22   Note Type   Note Type Treatment   Restrictions/Precautions   Weight Bearing Precautions Per Order No   Other Precautions Cognitive; Chair Alarm; Bed Alarm; Fall Risk;Multiple lines   General   Response to Previous Treatment Patient with no complaints from previous session   Pain Assessment   Pain Assessment Tool 0-10   Pain Score No Pain   ADL   Grooming Assistance 4  Minimal Assistance   Grooming Deficit Setup;Steadying;Verbal cueing;Supervision/safety; Increased time to complete;Wash/dry hands   UB Dressing Assistance 4  Minimal Assistance   UB Dressing Deficit Setup;Verbal cueing;Supervision/safety; Increased time to complete;Pull around back;Pull down in back   [address]  3  Moderate Assistance   Toileting Deficit Setup;Steadying;Verbal cueing;Supervison/safety; Increased time to complete;Perineal hygiene   Functional Standing Tolerance   Time 3-4 mins   Activity Dynamic standing balance activity   Comments Min A for balance/steadying   Bed Mobility   Supine to Sit 4  Minimal assistance   Additional items Assist x 1; Increased time required;Verbal cues   Sit to Supine 5  Supervision   Additional items Increased time required;Verbal cues   Additional Comments Pt lying supine with bed alarm activated at end of session  Call bell and phone within reach  All needs met and pt reports no further questions for OT at this time   Transfers   Sit to Stand 4  Minimal assistance   Additional items Assist x 1; Impulsive;Verbal cues   Stand to Sit 4  Minimal assistance   Additional items Assist x 1;Verbal cues; Impulsive   Toilet transfer 4  Minimal assistance   Additional items Assist x 1; Increased time required;Verbal cues;Standard toilet  (grab bar use)   Additional Comments Impulsive, cues for safe technique and hand placement   Functional Mobility   Functional Mobility 4  Minimal assistance   Additional Comments Assist x1   Additional items Rolling walker   Cognition   Overall Cognitive Status Impaired   Arousal/Participation Alert; Cooperative   Attention Attends with cues to redirect   Orientation Level Oriented X4   Memory Decreased recall of recent events;Decreased recall of precautions   Following Commands Follows one step commands with increased time or repetition   Activity Tolerance   Activity Tolerance Patient tolerated treatment well;Patient limited by fatigue   Medical Staff Anne Marie Dumont 399, RN   Assessment   Assessment Pt seen for OT treatment session focusing on functional activity tolerance, bed mobility, ADLs, and functional transfers/mobility  Pt alert and cooperative throughout session  Pt lying supine at start of session, completing bed mobility (supine>sit) w/ Min A with assist for trunk control  Transfers (sit<>Stand) completed w/ Min A with assist to initiate forward weight shift from surface for sit>stand and assist for controlled descent to surface for stand>sit  Pt required verbal cues for safe technique 2* increased impulsivity demonstrated  Min A required for functional mobility with assist for balance/steadying with verbal cues for pacing 2* decreased safety awareness demonstrated  Toileting tasks completed w/ Mod A with assist for thoroughness for posterior hygiene  Grooming tasks completed w/ Min A while standing at sink to wash/dry hands 2* 1 LOB noted, requiring Min A from therapist to regain  UB dressing completed w/ Min A with assist to pull gown over shldrs/down in back   Pt returned to supine position w/ Supervision w/ HOB elevated and use of bed rails  Pt lying supine with bed alarm activated at end of session  Call bell and phone within reach  All needs met and pt reports no further questions for OT at this time  Continue to recommend STR vs Home OT pending progress and available home support when medically cleared  OT to follow pt on caseload  Plan   Treatment Interventions ADL retraining;Functional transfer training;UE strengthening/ROM; Endurance training;Patient/family training;Equipment evaluation/education; Compensatory technique education; Energy conservation; Activityengagement   Goal Expiration Date 07/02/22   OT Treatment Day 1   OT Frequency 3-5x/wk   Recommendation   OT Discharge Recommendation Post acute rehabilitation services  (vs Home OT pending continued progress and available support in home environment)   OT - OK to Discharge Yes  (when medically cleared)   Additional Comments  The patient's raw score on the AM-PAC Daily Activity inpatient short form is 17, standardized score is 37 26, less than 39 4  Patients at this level are likely to benefit from discharge to post-acute rehabilitation services  Please refer to the recommendation of the Occupational Therapist for safe discharge planning     AM-PAC Daily Activity Inpatient   Lower Body Dressing 2   Bathing 3   Toileting 2   Upper Body Dressing 3   Grooming 3   Eating 4   Daily Activity Raw Score 17   Daily Activity Standardized Score (Calc for Raw Score >=11) 37 26   AM-PAC Applied Cognition Inpatient   Following a Speech/Presentation 3   Understanding Ordinary Conversation 4   Taking Medications 2   Remembering Where Things Are Placed or Put Away 3   Remembering List of 4-5 Errands 3   Taking Care of Complicated Tasks 2   Applied Cognition Raw Score 17   Applied Cognition Standardized Score 36 52       Melo Singh, OTR/L

## 2022-06-22 NOTE — ASSESSMENT & PLAN NOTE
· 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 at [facility]  · Outpatient bariatric follow-up

## 2022-06-22 NOTE — PLAN OF CARE
Problem: Prexisting or High Potential for Compromised Skin Integrity  Goal: Skin integrity is maintained or improved  Description: INTERVENTIONS:  - Identify patients at risk for skin breakdown  - Assess and monitor skin integrity  - Assess and monitor nutrition and hydration status  - Monitor labs   - Assess for incontinence   - Turn and reposition patient  - Assist with mobility/ambulation  - Relieve pressure over bony prominences  - Avoid friction and shearing  - Provide appropriate hygiene as needed including keeping skin clean and dry  - Evaluate need for skin moisturizer/barrier cream  - Collaborate with interdisciplinary team   - Patient/family teaching  - Consider wound care consult   Outcome: Progressing     Problem: MOBILITY - ADULT  Goal: Maintain or return to baseline ADL function  Description: INTERVENTIONS:  -  Assess patient's ability to carry out ADLs; assess patient's baseline for ADL function and identify physical deficits which impact ability to perform ADLs (bathing, care of mouth/teeth, toileting, grooming, dressing, etc )  - Assess/evaluate cause of self-care deficits   - Assess range of motion  - Assess patient's mobility; develop plan if impaired  - Assess patient's need for assistive devices and provide as appropriate  - Encourage maximum independence but intervene and supervise when necessary  - Involve family in performance of ADLs  - Assess for home care needs following discharge   - Consider OT consult to assist with ADL evaluation and planning for discharge  - Provide patient education as appropriate  Outcome: Progressing  Goal: Maintains/Returns to pre admission functional level  Description: INTERVENTIONS:  - Perform BMAT or MOVE assessment daily    - Set and communicate daily mobility goal to care team and patient/family/caregiver  - Collaborate with rehabilitation services on mobility goals if consulted  - Perform Range of Motion 3 times a day    - Reposition patient every 2 hours   - Dangle patient 3 times a day  - Stand patient 3 times a day  - Out of bed for toileting  - Record patient progress and toleration of activity level   Outcome: Progressing     Problem: Potential for Falls  Goal: Patient will remain free of falls  Description: INTERVENTIONS:  - Educate patient/family on patient safety including physical limitations  - Instruct patient to call for assistance with activity   - Consult OT/PT to assist with strengthening/mobility   - Keep Call bell within reach  - Keep bed low and locked with side rails adjusted as appropriate  - Keep care items and personal belongings within reach  - Initiate and maintain comfort rounds  - Make Fall Risk Sign visible to staff  - Offer Toileting every 2 Hours, in advance of need  - Initiate/Maintain bed alarm  - Obtain necessary fall risk management equipment  - Apply yellow socks and bracelet for high fall risk patients  - Consider moving patient to room near nurses station  Outcome: Progressing     Problem: PAIN - ADULT  Goal: Verbalizes/displays adequate comfort level or baseline comfort level  Description: Interventions:  - Encourage patient to monitor pain and request assistance  - Assess pain using appropriate pain scale  - Administer analgesics based on type and severity of pain and evaluate response  - Implement non-pharmacological measures as appropriate and evaluate response  - Consider cultural and social influences on pain and pain management  - Notify physician/advanced practitioner if interventions unsuccessful or patient reports new pain  Outcome: Progressing     Problem: INFECTION - ADULT  Goal: Absence or prevention of progression during hospitalization  Description: INTERVENTIONS:  - Assess and monitor for signs and symptoms of infection  - Monitor lab/diagnostic results  - Monitor all insertion sites, i e  indwelling lines, tubes, and drains  - Monitor endotracheal if appropriate and nasal secretions for changes in amount and color  - Chancellor appropriate cooling/warming therapies per order  - Administer medications as ordered  - Instruct and encourage patient and family to use good hand hygiene technique  - Identify and instruct in appropriate isolation precautions for identified infection/condition  Outcome: Progressing  Goal: Absence of fever/infection during neutropenic period  Description: INTERVENTIONS:  - Monitor WBC    Outcome: Progressing     Problem: SAFETY ADULT  Goal: Maintain or return to baseline ADL function  Description: INTERVENTIONS:  -  Assess patient's ability to carry out ADLs; assess patient's baseline for ADL function and identify physical deficits which impact ability to perform ADLs (bathing, care of mouth/teeth, toileting, grooming, dressing, etc )  - Assess/evaluate cause of self-care deficits   - Assess range of motion  - Assess patient's mobility; develop plan if impaired  - Assess patient's need for assistive devices and provide as appropriate  - Encourage maximum independence but intervene and supervise when necessary  - Involve family in performance of ADLs  - Assess for home care needs following discharge   - Consider OT consult to assist with ADL evaluation and planning for discharge  - Provide patient education as appropriate  Outcome: Progressing  Goal: Maintains/Returns to pre admission functional level  Description: INTERVENTIONS:  - Perform BMAT or MOVE assessment daily    - Set and communicate daily mobility goal to care team and patient/family/caregiver  - Collaborate with rehabilitation services on mobility goals if consulted  - Perform Range of Motion 3 times a day  - Reposition patient every 2 hours    - Dangle patient 3 times a day  - Stand patient 3 times a day  - Out of bed for toileting  - Record patient progress and toleration of activity level   Outcome: Progressing  Goal: Patient will remain free of falls  Description: INTERVENTIONS:  - Educate patient/family on patient safety including physical limitations  - Instruct patient to call for assistance with activity   - Consult OT/PT to assist with strengthening/mobility   - Keep Call bell within reach  - Keep bed low and locked with side rails adjusted as appropriate  - Keep care items and personal belongings within reach  - Initiate and maintain comfort rounds  - Make Fall Risk Sign visible to staff  - Offer Toileting every 2 Hours, in advance of need  - Initiate/Maintain bed alarm  - Obtain necessary fall risk management equipment  - Apply yellow socks and bracelet for high fall risk patients  - Consider moving patient to room near nurses station  Outcome: Progressing     Problem: DISCHARGE PLANNING  Goal: Discharge to home or other facility with appropriate resources  Description: INTERVENTIONS:  - Identify barriers to discharge w/patient and caregiver  - Arrange for needed discharge resources and transportation as appropriate  - Identify discharge learning needs (meds, wound care, etc )  - Arrange for interpretive services to assist at discharge as needed  - Refer to Case Management Department for coordinating discharge planning if the patient needs post-hospital services based on physician/advanced practitioner order or complex needs related to functional status, cognitive ability, or social support system  Outcome: Progressing     Problem: Knowledge Deficit  Goal: Patient/family/caregiver demonstrates understanding of disease process, treatment plan, medications, and discharge instructions  Description: Complete learning assessment and assess knowledge base    Interventions:  - Provide teaching at level of understanding  - Provide teaching via preferred learning methods  Outcome: Progressing     Problem: GASTROINTESTINAL - ADULT  Goal: Minimal or absence of nausea and/or vomiting  Description: INTERVENTIONS:  - Administer IV fluids if ordered to ensure adequate hydration  - Maintain NPO status until nausea and vomiting are resolved  - Nasogastric tube if ordered  - Administer ordered antiemetic medications as needed  - Provide nonpharmacologic comfort measures as appropriate  - Advance diet as tolerated, if ordered  - Consider nutrition services referral to assist patient with adequate nutrition and appropriate food choices  Outcome: Progressing  Goal: Maintains or returns to baseline bowel function  Description: INTERVENTIONS:  - Assess bowel function  - Encourage oral fluids to ensure adequate hydration  - Administer IV fluids if ordered to ensure adequate hydration  - Administer ordered medications as needed  - Encourage mobilization and activity  - Consider nutritional services referral to assist patient with adequate nutrition and appropriate food choices  Outcome: Progressing  Goal: Maintains adequate nutritional intake  Description: INTERVENTIONS:  - Monitor percentage of each meal consumed  - Identify factors contributing to decreased intake, treat as appropriate  - Assist with meals as needed  - Monitor I&O, weight, and lab values if indicated  - Obtain nutrition services referral as needed  Outcome: Progressing  Goal: Establish and maintain optimal ostomy function  Description: INTERVENTIONS:  - Assess bowel function  - Encourage oral fluids to ensure adequate hydration  - Administer IV fluids if ordered to ensure adequate hydration   - Administer ordered medications as needed  - Encourage mobilization and activity  - Nutrition services referral to assist patient with appropriate food choices  - Assess stoma site  - Consider wound care consult   Outcome: Progressing  Goal: Oral mucous membranes remain intact  Description: INTERVENTIONS  - Assess oral mucosa and hygiene practices  - Implement preventative oral hygiene regimen  - Implement oral medicated treatments as ordered  - Initiate Nutrition services referral as needed  Outcome: Progressing     Problem: Nutrition/Hydration-ADULT  Goal: Nutrient/Hydration intake appropriate for improving, restoring or maintaining nutritional needs  Description: Monitor and assess patient's nutrition/hydration status for malnutrition  Collaborate with interdisciplinary team and initiate plan and interventions as ordered  Monitor patient's weight and dietary intake as ordered or per policy  Utilize nutrition screening tool and intervene as necessary  Determine patient's food preferences and provide high-protein, high-caloric foods as appropriate       INTERVENTIONS:  - Monitor oral intake, urinary output, labs, and treatment plans  - Assess nutrition and hydration status and recommend course of action  - Evaluate amount of meals eaten  - Assist patient with eating if necessary   - Allow adequate time for meals  - Recommend/ encourage appropriate diets, oral nutritional supplements, and vitamin/mineral supplements  - Order, calculate, and assess calorie counts as needed  - Recommend, monitor, and adjust tube feedings and TPN/PPN based on assessed needs  - Assess need for intravenous fluids  - Provide specific nutrition/hydration education as appropriate  - Include patient/family/caregiver in decisions related to nutrition  Outcome: Progressing     Problem: HEMATOLOGIC - ADULT  Goal: Maintains hematologic stability  Description: INTERVENTIONS  - Assess for signs and symptoms of bleeding or hemorrhage  - Monitor labs  - Administer supportive blood products/factors as ordered and appropriate  Outcome: Progressing

## 2022-06-22 NOTE — PROGRESS NOTES
Consultation - Bariatric Surgery   Jesse Griffin 64 y o  female MRN: [id number]  Unit/Bed#: E5 -01 Encounter: [id number]    Assessment/Plan     Assessment:  63 yo female with hx of RNYGB at [facility] in 2012 with hx of known marginal ulcers who presented to [address] [place] [address] with a bleeding marginal ulcer now s/p EGDx2 with GI     Plan:  1  Continue PPI BID2  Continue to trend H/H q24h  3  May advance to full liquids  4  Zofran PRN for nausea/vomiting  5  Transfuse for Hgb < 7   6  If patient rebleeds will contact IR or GI for possible interventions before taking the patient to the OR  7  Continue cytotec and carafate  8  Continue TPN for nutritional optimization and plan for potential outpatient revision  Patient will require repeat endoscopy 6 weeks after discharge      History of Present Illness     Subjective:  Patient denies overnight events  Per nursing report, patient No episodes of melena or vomiting for over 24 hours  Hospital Course:  Jesse Griffin is a 64 y o  female Body mass index is 21 8 kg/m²  with history of RNYGB at [facility] in 2012 who presented to [address] [place] [address] ER on 6/11/22 with UGIB  She has undergone two EGDs with GI on 6/12 and 6/15 that showed a large, deep, irregular ulcer at the [address] [facility] anastomosis with nonbleeding visible vessel  Induced coagulation and hemostasis achieved with 2 applications of bipolar cautery, epinephrine injection  Since admission she has received 6 U pRBCs    She was transferred to [facility] on 6/16/22 for bariatric surgery evaluation  6/18 - no acute events overnight  Her H/H has remained stable for the past 24 hrs (7 -> 7 8 -> 7 3 -> 7 2) without requiring any transfusions  She has remained hemodynamically stable  She denies nausea and vomiting as well as abdominal pain  6/19 - transferred out of the ICU yesterday  H/H continues to remain stable without further transfusions  She denies nausea and vomiting   No bloody bowel movements per nursing    - PICC placed    Consults    Review of Systems   Constitutional: Negative for chills and fever  HENT: Negative for ear pain and sore throat  Eyes: Negative for pain and visual disturbance  Respiratory: Negative for cough and shortness of breath  Cardiovascular: Negative for chest pain and palpitations  Gastrointestinal: Negative for abdominal pain and vomiting  Genitourinary: Negative for dysuria and hematuria  Musculoskeletal: Negative for arthralgias and back pain  Skin: Negative for color change and rash  Neurological: Negative for dizziness, seizures and syncope  All other systems reviewed and are negative        Historical Information   Past Medical History:   Diagnosis Date    Anemia     Anxiety     Bipolar disorder ([facility] [address] )     Colon polyp     Disease of thyroid gland     Essential hypertension     Essential tremor     Fibromyalgia, primary     GERD (gastroesophageal reflux disease)     Inflammatory polyarthropathy (HCC)     Mammogram abnormal      Past Surgical History:   Procedure Laterality Date    BREAST BIOPSY Right 2015    benign    CARPAL TUNNEL RELEASE Bilateral     COLONOSCOPY      EGD AND COLONOSCOPY  2014    GASTRIC BYPASS  2012    MAMMO NEEDLE LOCALIZATION RIGHT (ALL [employer]) Right 2012    MAMMO NEEDLE LOCALIZATION RIGHT (ALL INC) Right 2012    ULNAR NERVE TRANSPOSITION Right      Social History   Social History     Substance and Sexual Activity   Alcohol Use Yes    Alcohol/week: 4 0 standard drinks    Types: 4 Glasses of wine per week    Comment: rare     Social History     Substance and Sexual Activity   Drug Use Never     Social History     Tobacco Use   Smoking Status Former Smoker    Quit date: 12    Years since quittin 4   Smokeless Tobacco Never Used     Family History: Non-contributory      Meds/Allergies   No Known Allergies    Objective   First Vitals:   Blood Pressure: 105/65 (06/16/22 2130)  Pulse: 84 (06/16/22 2125)  Temperature: (!) 97 2 °F (36 2 °C) (06/16/22 2130)  Temp Source: Temporal (06/16/22 2130)  Respirations: 20 (06/16/22 2125)  Height: 5' 4" (162 6 cm) (06/16/22 2130)  Weight - Scale: 57 4 kg (126 lb 8 7 oz) (06/16/22 2123)  SpO2: 99 % (06/16/22 2125)    Current Vitals:   Blood Pressure: 117/56 (06/22/22 0743)  Pulse: 79 (06/22/22 0743)  Temperature: 98 6 °F (37 °C) (06/22/22 0743)  Temp Source: Oral (06/19/22 0644)  Respirations: 18 (06/22/22 0005)  Height: 5' 4" (162 6 cm) (06/16/22 2130)  Weight - Scale: 57 6 kg (126 lb 15 8 oz) (06/22/22 0600)  SpO2: 97 % (06/22/22 0743)      Intake/Output Summary (Last 24 hours) at 6/22/2022 0958  Last data filed at 6/21/2022 1300  Gross per 24 hour   Intake 360 ml   Output --   Net 360 ml       Invasive Devices  Report    Peripherally Inserted Central Catheter Line  Duration           PICC Line 42/76/36 Right Basilic 1 day                Physical Exam  Constitutional:       Appearance: Normal appearance  She is normal weight  Comments: Frail appearing    HENT:      Head: Normocephalic and atraumatic  Cardiovascular:      Rate and Rhythm: Normal rate  Pulmonary:      Effort: Pulmonary effort is normal    Abdominal:      General: Abdomen is flat  There is no distension  Palpations: Abdomen is soft  There is no mass  Tenderness: There is no guarding or rebound  Musculoskeletal:         General: Normal range of motion  Skin:     General: Skin is warm and dry  Neurological:      Mental Status: She is alert     Psychiatric:         Mood and Affect: Mood normal          Behavior: Behavior normal

## 2022-06-22 NOTE — ASSESSMENT & PLAN NOTE
· Lower extremity Doppler negative for DVT  · Likely due to low albumin and 3rd spacing  · Elevate when resting  · Improving  · Compression stockings on discharge

## 2022-06-22 NOTE — PROGRESS NOTES
[address] [facility]  Progress Note - Angela Fall 1945, 64 y o  female MRN: [id number]  Unit/Bed#: E5 -01 Encounter: [id number]  Primary Care Provider: Ailyn Jacobsen MD   Date and time admitted to hospital: 6/16/2022  9:16 PM    Gastric ulcer  Assessment & Plan  · EGD 6/12 revealed single large deep irregular benign appearing ulcer gastrojejunal anastomoses with adherent clot status post 2 clips, injected epinephrine and hemostasis achieved  · Emergent EGD 06/15/2022 revealed single large deep ulcer in gastrojejunal anastomosis with nonbleeding visible vessel, induced coagulation and hemostasis achieved with cautery and epinephrine injection  · Bariatric surgery follow-up appreciated  · Full bariatric diet  · Continue Protonix b i d , Carafate, Cytotec  · PICC line placed, initiated on TPN  Management per bariatrics   · Plan for nutritional optimization and potential outpatient revision  Plan for repeat endoscopy 6 weeks after discharge      * Acute blood loss anemia  Assessment & Plan  This is a 78-year-old female with history of Savage-en-Y gastric bypass, symptomatic anemia, gastric ulcer, hypothyroidism, depression initially admitted on 06/11/2022 to [facility] for hematemesis and blood per rectum Secondary to marginal/anastomotic ulcer status post EGD x2  · Status post total 6 unit PRBCs  · Hemoglobin stable  · Transfuse further as needed to maintain greater than 7  · Appreciate GI, bariatric recommendations    Bilateral leg edema  Assessment & Plan  · Lower extremity Doppler negative for DVT  · Likely due to low albumin and 3rd spacing  · Elevate when resting  · Improving  · Compression stockings on discharge     Severe protein-calorie malnutrition (HCC)  Assessment & Plan  Malnutrition Findings:   Adult Malnutrition type: Chronic illness  BMI Findings: Body mass index is 21 8 kg/m²       TPN initiated  Full bariatric diet    Ambulatory dysfunction  Assessment & Plan  Appreciate PT/OT recommendations    H/O bariatric surgery - bypass  Assessment & Plan  · 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 at [facility]  · Outpatient bariatric follow-up    Depression  Assessment & Plan  · Continue Klonopin  · Seen by neuropsychiatry  deemed patient not to have capacity to make decisions    Hypothyroidism  Assessment & Plan  · Continue Synthroid      VTE Pharmacologic Prophylaxis:  On hold due to GI bleeding    Patient Centered Rounds:  Patient care rounds were performed with nursing    Discussions with Specialists or Other Care Team Provider: Bariatrics     Education and Discussions with Family / Patient: arsalan Villar     Time Spent for Care: 30  More than 50% of total time spent on counseling and coordination of care as described above  Current Length of Stay: 6 day(s)    Current Patient Status: Inpatient   Certification Statement: The patient will continue to require additional inpatient hospital stay due to awaiting placement to rehab    Discharge Plan:  Medically stable, waiting placement    Code Status: Level 1 - Full Code      Subjective:   Patient seen evaluated at bedside  She reports she feels better but is frustrated with her incontinence  Objective:     Vitals:   Temp (24hrs), Av 7 °F (37 1 °C), Min:98 4 °F (36 9 °C), Max:99 1 °F (37 3 °C)    Temp:  [98 4 °F (36 9 °C)-99 1 °F (37 3 °C)] 98 4 °F (36 9 °C)  HR:  [79-89] 79  Resp:  [18] 18  BP: (104-123)/(54-69) 104/54  SpO2:  [97 %-98 %] 97 %  Body mass index is 21 8 kg/m²  Input and Output Summary (last 24 hours):     No intake or output data in the 24 hours ending 22 2213    Physical Exam:     Physical Exam  Vitals reviewed  Constitutional:       General: She is not in acute distress  Appearance: She is well-developed  She is not ill-appearing, toxic-appearing or diaphoretic  Comments: Thin appearing   HENT:      Head: Normocephalic and atraumatic     Eyes: General: No scleral icterus  Conjunctiva/sclera: Conjunctivae normal    Cardiovascular:      Rate and Rhythm: Normal rate and regular rhythm  Heart sounds: Normal heart sounds  Pulmonary:      Effort: Pulmonary effort is normal  No respiratory distress  Breath sounds: Normal breath sounds  No wheezing or rales  Abdominal:      General: There is no distension  Palpations: Abdomen is soft  Tenderness: There is no abdominal tenderness  There is no guarding or rebound  Musculoskeletal:         General: No swelling, tenderness or deformity  Right lower leg: Edema present  Left lower leg: Edema present  Skin:     General: Skin is warm and dry  Neurological:      General: No focal deficit present  Mental Status: She is alert  Mental status is at baseline  Psychiatric:         Mood and Affect: Mood normal          Behavior: Behavior normal          Thought Content: Thought content normal          Judgment: Judgment normal          Additional Data:     Labs: I have reviewed pertinent results     Results from last 7 days   Lab Units 06/22/22  0532 06/20/22  1857 06/20/22  0747   WBC Thousand/uL 10 80*   < > 9 84   HEMOGLOBIN g/dL 7 8*   < > 8 0*   HEMATOCRIT % 25 6*   < > 25 0*   PLATELETS Thousands/uL 310   < > 489*   NEUTROS PCT %  --   --  59   LYMPHS PCT %  --   --  19   MONOS PCT %  --   --  11   EOS PCT %  --   --  10*    < > = values in this interval not displayed       Results from last 7 days   Lab Units 06/22/22  0532 06/21/22  0525 06/20/22  1318 06/18/22  0624 06/17/22  0536   SODIUM mmol/L 133*   < >  --    < > 138   POTASSIUM mmol/L 4 6   < >  --    < > 3 0*   CHLORIDE mmol/L 102   < >  --    < > 106   CO2 mmol/L 25   < >  --    < > 25   BUN mg/dL 11   < >  --    < > 22   CREATININE mg/dL 1 07   < >  --    < > 1 17   ANION GAP mmol/L 6   < >  --    < > 7   CALCIUM mg/dL 7 4*   < >  --    < > 7 6*   ALBUMIN g/dL  --   --  1 8*  --  2 0*   TOTAL BILIRUBIN mg/dL  --   --   --   --  0 56   ALK PHOS U/L  --   --   --   --  38*   ALT U/L  --   --   --   --  12   AST U/L  --   --   --   --  16   GLUCOSE RANDOM mg/dL 106   < >  --    < > 71    < > = values in this interval not displayed  Results from last 7 days   Lab Units 06/16/22  0437   INR  1 36*     Results from last 7 days   Lab Units 06/16/22  2202 06/16/22  0747   POC GLUCOSE mg/dl 115 173*         Results from last 7 days   Lab Units 06/15/22  2344 06/15/22  2107   LACTIC ACID mmol/L 1 8 3 6*         Imaging: I have reviewed pertinent imaging       Recent Cultures (last 7 days):           Last 24 Hours Medication List:   Current Facility-Administered Medications   Medication Dose Route Frequency Provider Last Rate    acetaminophen  650 mg Oral Q6H PRN Clyde Donnelly MD      Adult TPN (CUSTOM BASE/STANDARD ELECTROLYTE)   Intravenous Continuous TPN Jovita Hylton DO      Adult TPN (STANDARD BASE/STANDARD ELECTROLYTE)   Intravenous Continuous TPN Jovita Hylton DO 41 6 mL/hr at 06/21/22 2125    clonazePAM  1 mg Oral HS Clyde Donnelly MD      folic acid  1 mg Oral Daily Dewey Wilson DO      levothyroxine  112 mcg Oral Early Morning Clyde Donnelly MD      misoprostol  200 mcg Oral 4x Daily (with meals and at bedtime) Clyde Donnelly MD      ondansetron  4 mg Intravenous Q4H PRN Clyde Donnelly MD      pantoprazole  40 mg Oral BID AC Dewey Wilson DO      potassium chloride  40 mEq Oral BID Clyde Donnelly MD      sucralfate  1 g Oral 4x Daily (AC & HS) Clyde Donnelly MD      thiamine  100 mg Oral Daily Dewey Wilson DO          Today, Patient Was Seen By: Dewey Wilson DO    ** Please Note: Dictation voice to text software may have been used in the creation of this document   **

## 2022-06-22 NOTE — ASSESSMENT & PLAN NOTE
· EGD 6/12 revealed single large deep irregular benign appearing ulcer gastrojejunal anastomoses with adherent clot status post 2 clips, injected epinephrine and hemostasis achieved  · Emergent EGD 06/15/2022 revealed single large deep ulcer in gastrojejunal anastomosis with nonbleeding visible vessel, induced coagulation and hemostasis achieved with cautery and epinephrine injection  · Bariatric surgery follow-up appreciated  · Full bariatric diet  · Continue Protonix b i d , Carafate, Cytotec  · PICC line placed, initiated on TPN  Management per bariatrics   · Plan for nutritional optimization and potential outpatient revision    Plan for repeat endoscopy 6 weeks after discharge

## 2022-06-22 NOTE — CASE MANAGEMENT
Case Management Discharge Planning Note    Patient name Daisy Shea  Location [address] /E[address]  [address] MRN [id number]  : 1945 Date 2022       Current Admission Date: 2022  Current Admission Diagnosis:Acute blood loss anemia   Patient Active Problem List    Diagnosis Date Noted    Hypocalcemia 2022    Severe protein-calorie malnutrition ([address] ) 2022    Bilateral leg edema     Neutrophilic leukocytosis     Ambulatory dysfunction 2022    Acute blood loss anemia 2022    Gastric ulcer 2022    Fever 2022    Anemia 2022    Dyspnea on exertion 2022    Generalized weakness 2022    Hyponatremia 2022    Abnormal CT scan 2022    H/O bariatric surgery - bypass 2022    Cerumen debris on tympanic membrane of right ear 2022    Nasal congestion 2022    Bilateral hearing loss 2022    Fibromyalgia syndrome 2021    Osteopenia of both forearms 2021    Medicare annual wellness visit, subsequent 2021    Shortness of breath 2021    Acute bronchitis 2021    COVID-19 2021    Dysphasia 2020    Epigastric pain 2020    Hypothyroidism 2020    Gastroesophageal reflux disease without esophagitis 2020    Depression 2020    Fibromyalgia, primary 2020    Iron deficiency 2020    Numbness of foot 2020      LOS (days): 6  Geometric Mean LOS (GMLOS) (days): 4 40  Days to GMLOS:-1 3     OBJECTIVE:  Risk of Unplanned Readmission Score: 18 24         Current admission status: Inpatient   Preferred Pharmacy:   [place] [address] [address] [address]  Phone: [phone] Fax: [phone]    Primary Care Provider: Wero Crowley MD    Primary Insurance: [facility]  Secondary Insurance:     DISCHARGE DETAILS:    CM spoke with Pts brother/POA Davon to discuss facility choices  [facility]'s first choice [facility], is not able to accept the Pt with TPN  CM provided education in ST[facility]-specific to [place] Medicaid- informing Keyur Rodriguez that Pt will need MA for LTC -Keyur Rodriguez verbalised understanding and agreed that referrals to dual eligible facilities will eliminate an additional move for his sister  CM made Keyur Rodriguez aware that some facilities are requesting a 4th covid vax  As per SHANTELL CAIN [facility] request- CM sent him the list of accepting facilities, and those requesting a covid booster  CM continues to follow

## 2022-06-22 NOTE — PLAN OF CARE
Problem: Prexisting or High Potential for Compromised Skin Integrity  Goal: Skin integrity is maintained or improved  Description: INTERVENTIONS:  - Identify patients at risk for skin breakdown  - Assess and monitor skin integrity  - Assess and monitor nutrition and hydration status  - Monitor labs   - Assess for incontinence   - Turn and reposition patient  - Assist with mobility/ambulation  - Relieve pressure over bony prominences  - Avoid friction and shearing  - Provide appropriate hygiene as needed including keeping skin clean and dry  - Evaluate need for skin moisturizer/barrier cream  - Collaborate with interdisciplinary team   - Patient/family teaching  - Consider wound care consult   Outcome: Progressing     Problem: MOBILITY - ADULT  Goal: Maintain or return to baseline ADL function  Description: INTERVENTIONS:  -  Assess patient's ability to carry out ADLs; assess patient's baseline for ADL function and identify physical deficits which impact ability to perform ADLs (bathing, care of mouth/teeth, toileting, grooming, dressing, etc )  - Assess/evaluate cause of self-care deficits   - Assess range of motion  - Assess patient's mobility; develop plan if impaired  - Assess patient's need for assistive devices and provide as appropriate  - Encourage maximum independence but intervene and supervise when necessary  - Involve family in performance of ADLs  - Assess for home care needs following discharge   - Consider OT consult to assist with ADL evaluation and planning for discharge  - Provide patient education as appropriate  Outcome: Progressing     Problem: Potential for Falls  Goal: Patient will remain free of falls  Description: INTERVENTIONS:  - Educate patient/family on patient safety including physical limitations  - Instruct patient to call for assistance with activity   - Consult OT/PT to assist with strengthening/mobility   - Keep Call bell within reach  - Keep bed low and locked with side rails adjusted as appropriate  - Keep care items and personal belongings within reach  - Initiate and maintain comfort rounds  - Make Fall Risk Sign visible to staff  - Offer Toileting every 2 Hours, in advance of need  - Initiate/Maintain bed alarm  - Obtain necessary fall risk management equipment  - Apply yellow socks and bracelet for high fall risk patients  - Consider moving patient to room near nurses station  Outcome: Progressing     Problem: SAFETY ADULT  Goal: Maintain or return to baseline ADL function  Description: INTERVENTIONS:  -  Assess patient's ability to carry out ADLs; assess patient's baseline for ADL function and identify physical deficits which impact ability to perform ADLs (bathing, care of mouth/teeth, toileting, grooming, dressing, etc )  - Assess/evaluate cause of self-care deficits   - Assess range of motion  - Assess patient's mobility; develop plan if impaired  - Assess patient's need for assistive devices and provide as appropriate  - Encourage maximum independence but intervene and supervise when necessary  - Involve family in performance of ADLs  - Assess for home care needs following discharge   - Consider OT consult to assist with ADL evaluation and planning for discharge  - Provide patient education as appropriate  Outcome: Progressing  Goal: Patient will remain free of falls  Description: INTERVENTIONS:  - Educate patient/family on patient safety including physical limitations  - Instruct patient to call for assistance with activity   - Consult OT/PT to assist with strengthening/mobility   - Keep Call bell within reach  - Keep bed low and locked with side rails adjusted as appropriate  - Keep care items and personal belongings within reach  - Initiate and maintain comfort rounds  - Make Fall Risk Sign visible to staff  - Offer Toileting every 2 Hours, in advance of need  - Initiate/Maintain bed alarm  - Obtain necessary fall risk management equipment  - Apply yellow socks and bracelet for high fall risk patients  - Consider moving patient to room near nurses station  Outcome: Progressing     Problem: DISCHARGE PLANNING  Goal: Discharge to home or other facility with appropriate resources  Description: INTERVENTIONS:  - Identify barriers to discharge w/patient and caregiver  - Arrange for needed discharge resources and transportation as appropriate  - Identify discharge learning needs (meds, wound care, etc )  - Arrange for interpretive services to assist at discharge as needed  - Refer to Case Management Department for coordinating discharge planning if the patient needs post-hospital services based on physician/advanced practitioner order or complex needs related to functional status, cognitive ability, or social support system  Outcome: Progressing     Problem: Knowledge Deficit  Goal: Patient/family/caregiver demonstrates understanding of disease process, treatment plan, medications, and discharge instructions  Description: Complete learning assessment and assess knowledge base  Interventions:  - Provide teaching at level of understanding  - Provide teaching via preferred learning methods  Outcome: Progressing     Problem: GASTROINTESTINAL - ADULT  Goal: Minimal or absence of nausea and/or vomiting  Description: INTERVENTIONS:  - Administer IV fluids if ordered to ensure adequate hydration  - Maintain NPO status until nausea and vomiting are resolved  - Nasogastric tube if ordered  - Administer ordered antiemetic medications as needed  - Provide nonpharmacologic comfort measures as appropriate  - Advance diet as tolerated, if ordered  - Consider nutrition services referral to assist patient with adequate nutrition and appropriate food choices  Outcome: Progressing     Problem: Nutrition/Hydration-ADULT  Goal: Nutrient/Hydration intake appropriate for improving, restoring or maintaining nutritional needs  Description: Monitor and assess patient's nutrition/hydration status for malnutrition  Collaborate with interdisciplinary team and initiate plan and interventions as ordered  Monitor patient's weight and dietary intake as ordered or per policy  Utilize nutrition screening tool and intervene as necessary  Determine patient's food preferences and provide high-protein, high-caloric foods as appropriate       INTERVENTIONS:  - Monitor oral intake, urinary output, labs, and treatment plans  - Assess nutrition and hydration status and recommend course of action  - Evaluate amount of meals eaten  - Assist patient with eating if necessary   - Allow adequate time for meals  - Recommend/ encourage appropriate diets, oral nutritional supplements, and vitamin/mineral supplements  - Order, calculate, and assess calorie counts as needed  - Recommend, monitor, and adjust tube feedings and TPN/PPN based on assessed needs  - Assess need for intravenous fluids  - Provide specific nutrition/hydration education as appropriate  - Include patient/family/caregiver in decisions related to nutrition  Outcome: Progressing

## 2022-06-22 NOTE — PLAN OF CARE
Problem: OCCUPATIONAL THERAPY ADULT  Goal: Performs self-care activities at highest level of function for planned discharge setting  See evaluation for individualized goals  Description: Treatment Interventions: ADL retraining, Functional transfer training, UE strengthening/ROM, Endurance training, Patient/family training, Equipment evaluation/education, Compensatory technique education, Continued evaluation, Activityengagement, Energy conservation          See flowsheet documentation for full assessment, interventions and recommendations  Outcome: Progressing  Note: Limitation: Decreased ADL status, Decreased UE strength, Decreased Safe judgement during ADL, Decreased cognition, Decreased endurance, Decreased self-care trans, Decreased high-level ADLs  Prognosis: Good  Assessment: Pt seen for OT treatment session focusing on functional activity tolerance, bed mobility, ADLs, and functional transfers/mobility  Pt alert and cooperative throughout session  Pt lying supine at start of session, completing bed mobility (supine>sit) w/ Min A with assist for trunk control  Transfers (sit<>Stand) completed w/ Min A with assist to initiate forward weight shift from surface for sit>stand and assist for controlled descent to surface for stand>sit  Pt required verbal cues for safe technique 2* increased impulsivity demonstrated  Min A required for functional mobility with assist for balance/steadying with verbal cues for pacing 2* decreased safety awareness demonstrated  Toileting tasks completed w/ Mod A with assist for thoroughness for posterior hygiene  Grooming tasks completed w/ Min A while standing at sink to wash/dry hands 2* 1 LOB noted, requiring Min A from therapist to regain  UB dressing completed w/ Min A with assist to pull gown over shldrs/down in back  Pt returned to supine position w/ Supervision w/ HOB elevated and use of bed rails  Pt lying supine with bed alarm activated at end of session   Call bell and phone within reach  All needs met and pt reports no further questions for OT at this time  Continue to recommend STR vs Home OT pending progress and available home support when medically cleared  OT to follow pt on caseload       OT Discharge Recommendation: Post acute rehabilitation services (vs Home OT pending continued progress and available support in home environment)  OT - OK to Discharge: Yes (when medically cleared)

## 2022-06-22 NOTE — ASSESSMENT & PLAN NOTE
Malnutrition Findings:   Adult Malnutrition type: Chronic illness  BMI Findings: Body mass index is 21 8 kg/m²       TPN initiated  Full bariatric diet

## 2022-06-23 LAB
ANION GAP SERPL CALCULATED.3IONS-SCNC: 3 MMOL/L (ref 4–13)
BUN SERPL-MCNC: 12 MG/DL (ref 5–25)
CALCIUM SERPL-MCNC: 7.2 MG/DL (ref 8.3–10.1)
CHLORIDE SERPL-SCNC: 101 MMOL/L (ref 100–108)
CO2 SERPL-SCNC: 28 MMOL/L (ref 21–32)
CREAT SERPL-MCNC: 0.98 MG/DL (ref 0.6–1.3)
GFR SERPL CREATININE-BSD FRML MDRD: 55 ML/MIN/1.73SQ M
GLUCOSE SERPL-MCNC: 77 MG/DL (ref 65–140)
MAGNESIUM SERPL-MCNC: 1.7 MG/DL (ref 1.6–2.6)
PHOSPHATE SERPL-MCNC: 2.9 MG/DL (ref 2.3–4.1)
POTASSIUM SERPL-SCNC: 4.2 MMOL/L (ref 3.5–5.3)
SODIUM SERPL-SCNC: 132 MMOL/L (ref 136–145)
TRIGL SERPL-MCNC: 102 MG/DL

## 2022-06-23 PROCEDURE — 97110 THERAPEUTIC EXERCISES: CPT

## 2022-06-23 PROCEDURE — 0011A HB IMMUNIZATION ADMIN COVID-19 100MCG/0.5ML FIRST DOSE: CPT | Performed by: INTERNAL MEDICINE

## 2022-06-23 PROCEDURE — 84100 ASSAY OF PHOSPHORUS: CPT | Performed by: INTERNAL MEDICINE

## 2022-06-23 PROCEDURE — 91301 COVID-19 MODERNA VACCINE BOOSTER 50 MCG/0.25ML SUSP: CPT | Performed by: INTERNAL MEDICINE

## 2022-06-23 PROCEDURE — 84478 ASSAY OF TRIGLYCERIDES: CPT | Performed by: SURGERY

## 2022-06-23 PROCEDURE — 99232 SBSQ HOSP IP/OBS MODERATE 35: CPT | Performed by: INTERNAL MEDICINE

## 2022-06-23 PROCEDURE — 97530 THERAPEUTIC ACTIVITIES: CPT

## 2022-06-23 PROCEDURE — 80048 BASIC METABOLIC PNL TOTAL CA: CPT | Performed by: INTERNAL MEDICINE

## 2022-06-23 PROCEDURE — 83735 ASSAY OF MAGNESIUM: CPT | Performed by: INTERNAL MEDICINE

## 2022-06-23 RX ADMIN — MISOPROSTOL 200 MCG: 200 TABLET ORAL at 21:36

## 2022-06-23 RX ADMIN — MISOPROSTOL 200 MCG: 200 TABLET ORAL at 07:21

## 2022-06-23 RX ADMIN — Medication: at 21:36

## 2022-06-23 RX ADMIN — MISOPROSTOL 200 MCG: 200 TABLET ORAL at 16:08

## 2022-06-23 RX ADMIN — SUCRALFATE 1 G: 1 TABLET ORAL at 16:08

## 2022-06-23 RX ADMIN — LEVOTHYROXINE SODIUM 112 MCG: 112 TABLET ORAL at 06:00

## 2022-06-23 RX ADMIN — PANTOPRAZOLE SODIUM 40 MG: 40 TABLET, DELAYED RELEASE ORAL at 16:08

## 2022-06-23 RX ADMIN — THIAMINE HCL TAB 100 MG 100 MG: 100 TAB at 08:35

## 2022-06-23 RX ADMIN — PANTOPRAZOLE SODIUM 40 MG: 40 TABLET, DELAYED RELEASE ORAL at 06:00

## 2022-06-23 RX ADMIN — FOLIC ACID 1 MG: 1 TABLET ORAL at 08:35

## 2022-06-23 RX ADMIN — POTASSIUM CHLORIDE 40 MEQ: 20 TABLET, EXTENDED RELEASE ORAL at 08:35

## 2022-06-23 RX ADMIN — SUCRALFATE 1 G: 1 TABLET ORAL at 21:36

## 2022-06-23 RX ADMIN — MISOPROSTOL 200 MCG: 200 TABLET ORAL at 11:18

## 2022-06-23 RX ADMIN — SUCRALFATE 1 G: 1 TABLET ORAL at 11:18

## 2022-06-23 RX ADMIN — ACETAMINOPHEN 325MG 650 MG: 325 TABLET ORAL at 23:38

## 2022-06-23 RX ADMIN — CLONAZEPAM 1 MG: 1 TABLET ORAL at 22:10

## 2022-06-23 RX ADMIN — SUCRALFATE 1 G: 1 TABLET ORAL at 06:00

## 2022-06-23 RX ADMIN — CX-024414 0.25 ML: 0.2 INJECTION, SUSPENSION INTRAMUSCULAR at 16:08

## 2022-06-23 RX ADMIN — POTASSIUM CHLORIDE 40 MEQ: 20 TABLET, EXTENDED RELEASE ORAL at 17:27

## 2022-06-23 NOTE — PLAN OF CARE
Problem: Prexisting or High Potential for Compromised Skin Integrity  Goal: Skin integrity is maintained or improved  Description: INTERVENTIONS:  - Identify patients at risk for skin breakdown  - Assess and monitor skin integrity  - Assess and monitor nutrition and hydration status  - Monitor labs   - Assess for incontinence   - Turn and reposition patient  - Assist with mobility/ambulation  - Relieve pressure over bony prominences  - Avoid friction and shearing  - Provide appropriate hygiene as needed including keeping skin clean and dry  - Evaluate need for skin moisturizer/barrier cream  - Collaborate with interdisciplinary team   - Patient/family teaching  - Consider wound care consult   Outcome: Progressing     Problem: MOBILITY - ADULT  Goal: Maintain or return to baseline ADL function  Description: INTERVENTIONS:  -  Assess patient's ability to carry out ADLs; assess patient's baseline for ADL function and identify physical deficits which impact ability to perform ADLs (bathing, care of mouth/teeth, toileting, grooming, dressing, etc )  - Assess/evaluate cause of self-care deficits   - Assess range of motion  - Assess patient's mobility; develop plan if impaired  - Assess patient's need for assistive devices and provide as appropriate  - Encourage maximum independence but intervene and supervise when necessary  - Involve family in performance of ADLs  - Assess for home care needs following discharge   - Consider OT consult to assist with ADL evaluation and planning for discharge  - Provide patient education as appropriate  Outcome: Progressing  Goal: Maintains/Returns to pre admission functional level  Description: INTERVENTIONS:  - Perform BMAT or MOVE assessment daily    - Set and communicate daily mobility goal to care team and patient/family/caregiver  - Collaborate with rehabilitation services on mobility goals if consulted  - Perform Range of Motion 3 times a day    - Reposition patient every 2 hours   - Dangle patient 3 times a day  - Stand patient 3 times a day  - Out of bed for toileting  - Record patient progress and toleration of activity level   Outcome: Progressing     Problem: Potential for Falls  Goal: Patient will remain free of falls  Description: INTERVENTIONS:  - Educate patient/family on patient safety including physical limitations  - Instruct patient to call for assistance with activity   - Consult OT/PT to assist with strengthening/mobility   - Keep Call bell within reach  - Keep bed low and locked with side rails adjusted as appropriate  - Keep care items and personal belongings within reach  - Initiate and maintain comfort rounds  - Make Fall Risk Sign visible to staff  - Offer Toileting every 2 Hours, in advance of need  - Initiate/Maintain bed alarm  - Obtain necessary fall risk management equipment  - Apply yellow socks and bracelet for high fall risk patients  - Consider moving patient to room near nurses station  Outcome: Progressing     Problem: PAIN - ADULT  Goal: Verbalizes/displays adequate comfort level or baseline comfort level  Description: Interventions:  - Encourage patient to monitor pain and request assistance  - Assess pain using appropriate pain scale  - Administer analgesics based on type and severity of pain and evaluate response  - Implement non-pharmacological measures as appropriate and evaluate response  - Consider cultural and social influences on pain and pain management  - Notify physician/advanced practitioner if interventions unsuccessful or patient reports new pain  Outcome: Progressing     Problem: INFECTION - ADULT  Goal: Absence or prevention of progression during hospitalization  Description: INTERVENTIONS:  - Assess and monitor for signs and symptoms of infection  - Monitor lab/diagnostic results  - Monitor all insertion sites, i e  indwelling lines, tubes, and drains  - Monitor endotracheal if appropriate and nasal secretions for changes in amount and color  - [place] appropriate cooling/warming therapies per order  - Administer medications as ordered  - Instruct and encourage patient and family to use good hand hygiene technique  - Identify and instruct in appropriate isolation precautions for identified infection/condition  Outcome: Progressing  Goal: Absence of fever/infection during neutropenic period  Description: INTERVENTIONS:  - Monitor WBC    Outcome: Progressing     Problem: SAFETY ADULT  Goal: Maintain or return to baseline ADL function  Description: INTERVENTIONS:  -  Assess patient's ability to carry out ADLs; assess patient's baseline for ADL function and identify physical deficits which impact ability to perform ADLs (bathing, care of mouth/teeth, toileting, grooming, dressing, etc )  - Assess/evaluate cause of self-care deficits   - Assess range of motion  - Assess patient's mobility; develop plan if impaired  - Assess patient's need for assistive devices and provide as appropriate  - Encourage maximum independence but intervene and supervise when necessary  - Involve family in performance of ADLs  - Assess for home care needs following discharge   - Consider OT consult to assist with ADL evaluation and planning for discharge  - Provide patient education as appropriate  Outcome: Progressing  Goal: Maintains/Returns to pre admission functional level  Description: INTERVENTIONS:  - Perform BMAT or MOVE assessment daily    - Set and communicate daily mobility goal to care team and patient/family/caregiver  - Collaborate with rehabilitation services on mobility goals if consulted  - Perform Range of Motion 3 times a day  - Reposition patient every 2 hours    - Dangle patient 3 times a day  - Stand patient 3 times a day  - Out of bed for toileting  - Record patient progress and toleration of activity level   Outcome: Progressing  Goal: Patient will remain free of falls  Description: INTERVENTIONS:  - Educate patient/family on patient safety including physical limitations  - Instruct patient to call for assistance with activity   - Consult OT/PT to assist with strengthening/mobility   - Keep Call bell within reach  - Keep bed low and locked with side rails adjusted as appropriate  - Keep care items and personal belongings within reach  - Initiate and maintain comfort rounds  - Make Fall Risk Sign visible to staff  - Offer Toileting every 2 Hours, in advance of need  - Initiate/Maintain bed alarm  - Obtain necessary fall risk management equipment  - Apply yellow socks and bracelet for high fall risk patients  - Consider moving patient to room near nurses station  Outcome: Progressing     Problem: DISCHARGE PLANNING  Goal: Discharge to home or other facility with appropriate resources  Description: INTERVENTIONS:  - Identify barriers to discharge w/patient and caregiver  - Arrange for needed discharge resources and transportation as appropriate  - Identify discharge learning needs (meds, wound care, etc )  - Arrange for interpretive services to assist at discharge as needed  - Refer to Case Management Department for coordinating discharge planning if the patient needs post-hospital services based on physician/advanced practitioner order or complex needs related to functional status, cognitive ability, or social support system  Outcome: Progressing     Problem: Knowledge Deficit  Goal: Patient/family/caregiver demonstrates understanding of disease process, treatment plan, medications, and discharge instructions  Description: Complete learning assessment and assess knowledge base    Interventions:  - Provide teaching at level of understanding  - Provide teaching via preferred learning methods  Outcome: Progressing     Problem: GASTROINTESTINAL - ADULT  Goal: Minimal or absence of nausea and/or vomiting  Description: INTERVENTIONS:  - Administer IV fluids if ordered to ensure adequate hydration  - Maintain NPO status until nausea and vomiting are resolved  - Nasogastric tube if ordered  - Administer ordered antiemetic medications as needed  - Provide nonpharmacologic comfort measures as appropriate  - Advance diet as tolerated, if ordered  - Consider nutrition services referral to assist patient with adequate nutrition and appropriate food choices  Outcome: Progressing  Goal: Maintains or returns to baseline bowel function  Description: INTERVENTIONS:  - Assess bowel function  - Encourage oral fluids to ensure adequate hydration  - Administer IV fluids if ordered to ensure adequate hydration  - Administer ordered medications as needed  - Encourage mobilization and activity  - Consider nutritional services referral to assist patient with adequate nutrition and appropriate food choices  Outcome: Progressing  Goal: Maintains adequate nutritional intake  Description: INTERVENTIONS:  - Monitor percentage of each meal consumed  - Identify factors contributing to decreased intake, treat as appropriate  - Assist with meals as needed  - Monitor I&O, weight, and lab values if indicated  - Obtain nutrition services referral as needed  Outcome: Progressing  Goal: Establish and maintain optimal ostomy function  Description: INTERVENTIONS:  - Assess bowel function  - Encourage oral fluids to ensure adequate hydration  - Administer IV fluids if ordered to ensure adequate hydration   - Administer ordered medications as needed  - Encourage mobilization and activity  - Nutrition services referral to assist patient with appropriate food choices  - Assess stoma site  - Consider wound care consult   Outcome: Progressing  Goal: Oral mucous membranes remain intact  Description: INTERVENTIONS  - Assess oral mucosa and hygiene practices  - Implement preventative oral hygiene regimen  - Implement oral medicated treatments as ordered  - Initiate Nutrition services referral as needed  Outcome: Progressing     Problem: HEMATOLOGIC - ADULT  Goal: Maintains hematologic stability  Description: INTERVENTIONS  - Assess for signs and symptoms of bleeding or hemorrhage  - Monitor labs  - Administer supportive blood products/factors as ordered and appropriate  Outcome: Progressing     Problem: Nutrition/Hydration-ADULT  Goal: Nutrient/Hydration intake appropriate for improving, restoring or maintaining nutritional needs  Description: Monitor and assess patient's nutrition/hydration status for malnutrition  Collaborate with interdisciplinary team and initiate plan and interventions as ordered  Monitor patient's weight and dietary intake as ordered or per policy  Utilize nutrition screening tool and intervene as necessary  Determine patient's food preferences and provide high-protein, high-caloric foods as appropriate       INTERVENTIONS:  - Monitor oral intake, urinary output, labs, and treatment plans  - Assess nutrition and hydration status and recommend course of action  - Evaluate amount of meals eaten  - Assist patient with eating if necessary   - Allow adequate time for meals  - Recommend/ encourage appropriate diets, oral nutritional supplements, and vitamin/mineral supplements  - Order, calculate, and assess calorie counts as needed  - Recommend, monitor, and adjust tube feedings and TPN/PPN based on assessed needs  - Assess need for intravenous fluids  - Provide specific nutrition/hydration education as appropriate  - Include patient/family/caregiver in decisions related to nutrition  Outcome: Progressing

## 2022-06-23 NOTE — PLAN OF CARE
Problem: PHYSICAL THERAPY ADULT  Goal: Performs mobility at highest level of function for planned discharge setting  See evaluation for individualized goals  Description: Treatment/Interventions: Functional transfer training, LE strengthening/ROM, Elevations, Therapeutic exercise, Endurance training, Cognitive reorientation, Patient/family training, Equipment eval/education, Gait training, Spoke to nursing, OT  Equipment Recommended:  (tbd)       See flowsheet documentation for full assessment, interventions and recommendations  6/23/2022 1743 by Raquel Wang PTA  Outcome: Not Progressing  Note: Prognosis: Good  Problem List: Decreased endurance, Impaired balance, Decreased mobility, Decreased safety awareness, Impaired judgement  Assessment: Pt  Seen for PT treatment session  Pt  Reports fatigue, ot sleeping well and not being able to eat  Pt reports being hungry and wanting to eat  Pt  Performs bed mobility rolling to the L and R 3x each direction with superviison with use of bed rails  Pt  Performs supine <--> long sit in bed with use of bed rails without support with supervision bed flat  Pt able to maintain longsitting x 3 minutes demonstrating good sitting balance  Pt  Performs supine b/l le arom exercises x 15 reps  and sidelying L le aa hip abd/ add x 3 reps  Attempted to progress to eob for transfer training and ambulation however pt  Declined each attempt stating "NO not right now I am too tired and I am nauseous  I did that the other day "  Attempted to educate pt on consistent participation in mobility however pt continued to refuse  Pt  requires increased time to perform and complete all exercises due to increased distractibility and Pt requiring frequent redirection t/o PT session and focus to task    Demonstrates impulsive behaviors at times during bed mobility and initaily with b/lle arom exercises  Requiring verbal cues to perform exercises slowly with focus to control and performance and technique  Pt will benefit from STR at d/c due to decreased activity tolerance, fatigue, mobility, overall strength and endurance  Pt is functioning below baseline level of mobility  Pt will continue to benefit from skilled inpt PT to address deficits as noted in order to maximize functional outcomes and mobility in or to return to PLOF  Barriers to Discharge: Inaccessible home environment, Decreased caregiver support        PT Discharge Recommendation: Post acute rehabilitation services          See flowsheet documentation for full assessment

## 2022-06-23 NOTE — ASSESSMENT & PLAN NOTE
This is a 38-year-old female with history of Savage-en-Y gastric bypass, symptomatic anemia, gastric ulcer, hypothyroidism, depression initially admitted on 06/11/2022 to [facility] for hematemesis and blood per rectum Secondary to marginal/anastomotic ulcer status post EGD x2  · Status post total 6 unit PRBCs  · Hemoglobin stable  · Transfuse further as needed to maintain greater than 7  · Appreciate GI, bariatric recommendations

## 2022-06-23 NOTE — ASSESSMENT & PLAN NOTE
Likely related to poor oral intake  TPN initiated today  On previous admission urine osmolality 190, urine sodium 25  Mild, stable

## 2022-06-23 NOTE — PROGRESS NOTES
PT currently meeting estimated needs via TPN, also on bariatric full liquids, taking in clear broth, sugar free jello and sugar free [place] ice  PT c/o being hungry and wanting to try more solid choices, consider adjusting diet to full liquid versus bariatric full liquid which will allow for a little more variety such as cream of wheat and yogurt but can still order sugar free jello and sugar free it[place] ice versus the bariatric full liquid which is very limited, also consider adding ensure max oral nutrition supplement  Will continue to monitor for diet advancement and tolerance  Reviewed labs: 6/23 Na 132, 6/20 folate and vit B12 elevated, consider d/cing folic acid as folate level elevated, 18 5  If Na continues to decrease adjust TPN to AA15 500mL, D40 600mL, lipids 20% 200mL provides total of 1516cal, 75g pro, 1300mL, lipid load  72g/kg/day, glucose load 3 0mg/kg/min

## 2022-06-23 NOTE — ASSESSMENT & PLAN NOTE
· 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 at [facility]  · Outpatient bariatric follow-up

## 2022-06-23 NOTE — PHYSICAL THERAPY NOTE
PHYSICAL THERAPY NOTE          Patient Name: Philip Anders  [id number] Date: 6/23/2022 06/23/22 1629   Note Type   Note Type Treatment   Pain Assessment   Pain Assessment Tool 0-10   Pain Score No Pain   Restrictions/Precautions   Other Precautions Bed Alarm; Chair Alarm  (96% SPO2ON RA, HR 83bpm)   General   Chart Reviewed Yes   Family/Caregiver Present No   Cognition   Overall Cognitive Status Impaired   Arousal/Participation Alert; Responsive; Cooperative   Attention Attends with cues to redirect   Orientation Level Oriented X4   Memory Decreased recall of precautions  (impulsive with rolling and supine to longsit)   Following Commands Follows one step commands without difficulty   Subjective   Subjective i WANT TO EAT, I AM SO HUNGRY  Bed Mobility   Rolling R 5  Supervision   Additional items Assist x 1;Bedrails; Increased time required   Rolling L 5  Supervision   Additional items Assist x 1;Bedrails; Increased time required;Verbal cues   Supine to Sit 5  Supervision   Additional items Assist x 1;Bedrails  (supine to long sit with supervison with berna of bed rails and head of bed flat)   Sit to Supine 5  Supervision   Additional items Assist x 1;Bedrails  (longsit to supine)   Additional Comments pt performed longsitting in bed x 3 minutes with supervison  Transfers   Additional Comments pt  declined transfers at this time x3  Ambulation/Elevation   Ambulation/Elevation Additional Comments pt  declined ambulation due to reports of nausea and fatigue  attempted 3x  pt declined   Balance   Static Sitting Good  (unsupported longsitting in bed with supervison )   Activity Tolerance   Activity Tolerance Patient limited by fatigue   Exercises   Quad Sets Supine;15 reps;Bilateral   Heelslides Supine;15 reps;AROM; Bilateral   Hip Flexion Supine;15 reps;AROM; Bilateral   Hip Abduction Supine;15 reps;AROM; Bilateral  (sidelying aa hip abd /add on l le x 3 reps )   Hip Adduction Supine;15 reps;AROM; Bilateral   Knee AROM Short Arc Quad Supine;15 reps;AROM; Bilateral   Ankle Pumps Supine;15 reps;AROM; Bilateral   Bridging Supine;Bilateral  (X 2)   Equipment Use   Comments verbal cues to perform all exercises slowly focusing on control, form and technique  pt ed on benefits of mobility, consistent participation in Pt in order to progress and make fucntional gains  Assessment   Prognosis Good   Problem List Decreased endurance; Impaired balance;Decreased mobility; Decreased safety awareness; Impaired judgement   Assessment Pt  Seen for PT treatment session  Pt  Reports fatigue, ot sleeping well and not being able to eat  Pt reports being hungry and wanting to eat  Pt  Performs bed mobility rolling to the L and R 3x each direction with superviison with use of bed rails  Pt  Performs supine <--> long sit in bed with use of bed rails without support with supervision bed flat  Pt able to maintain longsitting x 3 minutes demonstrating good sitting balance  Pt  Performs supine b/l le arom exercises x 15 reps  and sidelying L le aa hip abd/ add x 3 reps  Attempted to progress to eob for transfer training and ambulation however pt  Declined each attempt stating "NO not right now I am too tired and I am nauseous  I did that the other day "  Attempted to educate pt on consistent participation in mobility however pt continued to refuse  Pt  requires increased time to perform and complete all exercises due to increased distractibility and Pt requiring frequent redirection t/o PT session and focus to task  Demonstrates impulsive behaviors at times during bed mobility and initaily with b/lle arom exercises  Requiring verbal cues to perform exercises slowly with focus to control and performance and technique  Pt will benefit from STR at d/c due to decreased activity tolerance, fatigue, mobility, overall strength and endurance    Pt is functioning below baseline level of mobility  Pt will continue to benefit from skilled inpt PT to address deficits as noted in order to maximize functional outcomes and mobility in or to return to PLOF  Goals   Patient Goals TO GET SOMETHING TO EAT  STG Expiration Date 06/25/22   PT Treatment Day 1   Plan   Treatment/Interventions Therapeutic exercise; Bed mobility; Patient/family training;Spoke to nursing;LE strengthening/ROM   Progress Slow progress, medical status limitations   PT Frequency 3-5x/wk   Recommendation   PT Discharge Recommendation Post acute rehabilitation services   AM-PAC Basic Mobility Inpatient   Turning in Bed Without Bedrails 4   Lying on Back to Sitting on Edge of Flat Bed 3   Moving Bed to Chair 3   Standing Up From Chair 3   Walk in Room 3   Climb 3-5 Stairs 3   Basic Mobility Inpatient Raw Score 19   Basic Mobility Standardized Score 42 48   Highest Level Of Mobility   JH-HLM Goal 6: Walk 10 steps or more   JH-HLM Achieved 2: Bed activities/Dependent transfer   Education   Education Provided Mobility training;Home exercise program   Patient Reinforcement needed   End of Consult   Patient Position at End of Consult Bed/Chair alarm activated; All needs within reach; Supine   Akash Chi, PTA

## 2022-06-23 NOTE — CASE MANAGEMENT
Case Management Progress Note    Patient name Len Frank  [facility] /[facility] -* MRN [id number]  : 1945 Date 2022       LOS (days): 7  Geometric Mean LOS (GMLOS) (days): 4 40  Days to GMLOS:-2 2        OBJECTIVE:        Current admission status: Inpatient  Preferred Pharmacy:   [facility] [id number] [address]  Phone: [phone] Fax: [phone]    Primary Care Provider: Alondra Mims MD    Primary Insurance: [facility]  Secondary Insurance:     PROGRESS NOTE:        CM received communication from Pts brother Mary Gomez has requested Pt received the 4th covid vaccine  CM informed SLIM of same  CM following

## 2022-06-23 NOTE — PLAN OF CARE
Problem: Prexisting or High Potential for Compromised Skin Integrity  Goal: Skin integrity is maintained or improved  Description: INTERVENTIONS:  - Identify patients at risk for skin breakdown  - Assess and monitor skin integrity  - Assess and monitor nutrition and hydration status  - Monitor labs   - Assess for incontinence   - Turn and reposition patient  - Assist with mobility/ambulation  - Relieve pressure over bony prominences  - Avoid friction and shearing  - Provide appropriate hygiene as needed including keeping skin clean and dry  - Evaluate need for skin moisturizer/barrier cream  - Collaborate with interdisciplinary team   - Patient/family teaching  - Consider wound care consult   Outcome: Progressing     Problem: MOBILITY - ADULT  Goal: Maintain or return to baseline ADL function  Description: INTERVENTIONS:  -  Assess patient's ability to carry out ADLs; assess patient's baseline for ADL function and identify physical deficits which impact ability to perform ADLs (bathing, care of mouth/teeth, toileting, grooming, dressing, etc )  - Assess/evaluate cause of self-care deficits   - Assess range of motion  - Assess patient's mobility; develop plan if impaired  - Assess patient's need for assistive devices and provide as appropriate  - Encourage maximum independence but intervene and supervise when necessary  - Involve family in performance of ADLs  - Assess for home care needs following discharge   - Consider OT consult to assist with ADL evaluation and planning for discharge  - Provide patient education as appropriate  Outcome: Progressing     Problem: Potential for Falls  Goal: Patient will remain free of falls  Description: INTERVENTIONS:  - Educate patient/family on patient safety including physical limitations  - Instruct patient to call for assistance with activity   - Consult OT/PT to assist with strengthening/mobility   - Keep Call bell within reach  - Keep bed low and locked with side rails adjusted as appropriate  - Keep care items and personal belongings within reach  - Initiate and maintain comfort rounds  - Make Fall Risk Sign visible to staff  - Offer Toileting every 2 Hours, in advance of need  - Initiate/Maintain bed alarm  - Obtain necessary fall risk management equipment  - Apply yellow socks and bracelet for high fall risk patients  - Consider moving patient to room near nurses station  Outcome: Progressing     Problem: SAFETY ADULT  Goal: Maintain or return to baseline ADL function  Description: INTERVENTIONS:  -  Assess patient's ability to carry out ADLs; assess patient's baseline for ADL function and identify physical deficits which impact ability to perform ADLs (bathing, care of mouth/teeth, toileting, grooming, dressing, etc )  - Assess/evaluate cause of self-care deficits   - Assess range of motion  - Assess patient's mobility; develop plan if impaired  - Assess patient's need for assistive devices and provide as appropriate  - Encourage maximum independence but intervene and supervise when necessary  - Involve family in performance of ADLs  - Assess for home care needs following discharge   - Consider OT consult to assist with ADL evaluation and planning for discharge  - Provide patient education as appropriate  Outcome: Progressing  Goal: Patient will remain free of falls  Description: INTERVENTIONS:  - Educate patient/family on patient safety including physical limitations  - Instruct patient to call for assistance with activity   - Consult OT/PT to assist with strengthening/mobility   - Keep Call bell within reach  - Keep bed low and locked with side rails adjusted as appropriate  - Keep care items and personal belongings within reach  - Initiate and maintain comfort rounds  - Make Fall Risk Sign visible to staff  - Offer Toileting every 2 Hours, in advance of need  - Initiate/Maintain bed alarm  - Obtain necessary fall risk management equipment  - Apply yellow socks and bracelet for high fall risk patients  - Consider moving patient to room near nurses station  Outcome: Progressing     Problem: DISCHARGE PLANNING  Goal: Discharge to home or other facility with appropriate resources  Description: INTERVENTIONS:  - Identify barriers to discharge w/patient and caregiver  - Arrange for needed discharge resources and transportation as appropriate  - Identify discharge learning needs (meds, wound care, etc )  - Arrange for interpretive services to assist at discharge as needed  - Refer to Case Management Department for coordinating discharge planning if the patient needs post-hospital services based on physician/advanced practitioner order or complex needs related to functional status, cognitive ability, or social support system  Outcome: Progressing     Problem: Knowledge Deficit  Goal: Patient/family/caregiver demonstrates understanding of disease process, treatment plan, medications, and discharge instructions  Description: Complete learning assessment and assess knowledge base  Interventions:  - Provide teaching at level of understanding  - Provide teaching via preferred learning methods  Outcome: Progressing     Problem: GASTROINTESTINAL - ADULT  Goal: Minimal or absence of nausea and/or vomiting  Description: INTERVENTIONS:  - Administer IV fluids if ordered to ensure adequate hydration  - Maintain NPO status until nausea and vomiting are resolved  - Nasogastric tube if ordered  - Administer ordered antiemetic medications as needed  - Provide nonpharmacologic comfort measures as appropriate  - Advance diet as tolerated, if ordered  - Consider nutrition services referral to assist patient with adequate nutrition and appropriate food choices  Outcome: Progressing     Problem: Nutrition/Hydration-ADULT  Goal: Nutrient/Hydration intake appropriate for improving, restoring or maintaining nutritional needs  Description: Monitor and assess patient's nutrition/hydration status for malnutrition  Collaborate with interdisciplinary team and initiate plan and interventions as ordered  Monitor patient's weight and dietary intake as ordered or per policy  Utilize nutrition screening tool and intervene as necessary  Determine patient's food preferences and provide high-protein, high-caloric foods as appropriate       INTERVENTIONS:  - Monitor oral intake, urinary output, labs, and treatment plans  - Assess nutrition and hydration status and recommend course of action  - Evaluate amount of meals eaten  - Assist patient with eating if necessary   - Allow adequate time for meals  - Recommend/ encourage appropriate diets, oral nutritional supplements, and vitamin/mineral supplements  - Order, calculate, and assess calorie counts as needed  - Recommend, monitor, and adjust tube feedings and TPN/PPN based on assessed needs  - Assess need for intravenous fluids  - Provide specific nutrition/hydration education as appropriate  - Include patient/family/caregiver in decisions related to nutrition  Outcome: Progressing     Problem: HEMATOLOGIC - ADULT  Goal: Maintains hematologic stability  Description: INTERVENTIONS  - Assess for signs and symptoms of bleeding or hemorrhage  - Monitor labs  - Administer supportive blood products/factors as ordered and appropriate  Outcome: Progressing

## 2022-06-23 NOTE — PROGRESS NOTES
[facility]  Progress Note - Maria Luisa Senior 1945, 64 y o  female MRN: [id number]  Unit/Bed#: E5 -01 Encounter: [id number]  Primary Care Provider: Vanessa Diehl MD   Date and time admitted to hospital: 6/16/2022  9:16 PM    Gastric ulcer  Assessment & Plan  · EGD 6/12 revealed single large deep irregular benign appearing ulcer gastrojejunal anastomoses with adherent clot status post 2 clips, injected epinephrine and hemostasis achieved  · Emergent EGD 06/15/2022 revealed single large deep ulcer in gastrojejunal anastomosis with nonbleeding visible vessel, induced coagulation and hemostasis achieved with cautery and epinephrine injection  · Bariatric surgery follow-up appreciated  · Full bariatric diet  · Continue Protonix b i d , Carafate, Cytotec  · PICC line placed, initiated on TPN  Management per bariatrics   · Plan for nutritional optimization and potential outpatient revision    Plan for repeat endoscopy 6 weeks after discharge      * Acute blood loss anemia  Assessment & Plan  This is a 56-year-old female with history of Savage-en-Y gastric bypass, symptomatic anemia, gastric ulcer, hypothyroidism, depression initially admitted on 06/11/2022 to [facility] for hematemesis and blood per rectum Secondary to marginal/anastomotic ulcer status post EGD x2  · Status post total 6 unit PRBCs  · Hemoglobin stable  · Transfuse further as needed to maintain greater than 7  · Appreciate GI, bariatric recommendations    Ambulatory dysfunction  Assessment & Plan  Appreciate PT/OT recommendations  Awaiting transfer to rehab    H/O bariatric surgery - bypass  Assessment & Plan  · 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 at [facility]  · Outpatient bariatric follow-up    Hyponatremia  Assessment & Plan  Likely related to poor oral intake  TPN initiated today  On previous admission urine osmolality 190, urine sodium 25  Mild, stable    Depression  Assessment & Plan  · Continue Klonopin  · Seen by neuropsychiatry  deemed patient not to have capacity to make decisions  · Brother Tunde Melgar is POA    Hypothyroidism  Assessment & Plan  · Continue Synthroid      VTE Pharmacologic Prophylaxis: on hold due to anemia    Patient Centered Rounds:  Patient care rounds were performed with nursing    Discussions with Specialists or Other Care Team Provider: Bariatrics     Education and Discussions with Family / Patient: Called Brother with no answer     Time Spent for Care: 30  More than 50% of total time spent on counseling and coordination of care as described above  Current Length of Stay: 7 day(s)    Current Patient Status: Inpatient   Certification Statement: The patient will continue to require additional inpatient hospital stay due to awaiting placement     Discharge Plan: Medically stable awaiting placement     Code Status: Level 1 - Full Code      Subjective:   Patient seen and evaluated  She feels well  No complaints     Objective:     Vitals:   Temp (24hrs), Av 6 °F (37 °C), Min:97 8 °F (36 6 °C), Max:99 5 °F (37 5 °C)    Temp:  [97 8 °F (36 6 °C)-99 5 °F (37 5 °C)] 97 8 °F (36 6 °C)  HR:  [71-84] 84  Resp:  [16-18] 18  BP: ()/(35-54) 98/51  SpO2:  [96 %-97 %] 97 %  Body mass index is 20 89 kg/m²  Input and Output Summary (last 24 hours):     No intake or output data in the 24 hours ending 22 1337    Physical Exam:     Physical Exam  Vitals reviewed  Constitutional:       General: She is not in acute distress  Appearance: She is well-developed  She is not ill-appearing, toxic-appearing or diaphoretic  Comments: Thin appearing   HENT:      Head: Normocephalic and atraumatic  Mouth/Throat:      Pharynx: No oropharyngeal exudate  Eyes:      General: No scleral icterus  Conjunctiva/sclera: Conjunctivae normal    Cardiovascular:      Rate and Rhythm: Normal rate and regular rhythm  Heart sounds: Normal heart sounds  Pulmonary:      Effort: Pulmonary effort is normal  No respiratory distress  Breath sounds: Normal breath sounds  No wheezing or rales  Abdominal:      General: There is no distension  Palpations: Abdomen is soft  Tenderness: There is no abdominal tenderness  There is no guarding or rebound  Musculoskeletal:         General: No swelling, tenderness or deformity  Skin:     General: Skin is warm and dry  Neurological:      General: No focal deficit present  Mental Status: She is alert  Mental status is at baseline  Psychiatric:         Mood and Affect: Mood normal          Behavior: Behavior normal          Thought Content: Thought content normal          Judgment: Judgment normal          Additional Data:     Labs: I have reviewed pertinent results     Results from last 7 days   Lab Units 06/22/22  0532 06/20/22  1857 06/20/22  0747   WBC Thousand/uL 10 80*   < > 9 84   HEMOGLOBIN g/dL 7 8*   < > 8 0*   HEMATOCRIT % 25 6*   < > 25 0*   PLATELETS Thousands/uL 310   < > 489*   NEUTROS PCT %  --   --  59   LYMPHS PCT %  --   --  19   MONOS PCT %  --   --  11   EOS PCT %  --   --  10*    < > = values in this interval not displayed  Results from last 7 days   Lab Units 06/23/22  0619 06/21/22  0525 06/20/22  1318 06/18/22  0624 06/17/22  0536   SODIUM mmol/L 132*   < >  --    < > 138   POTASSIUM mmol/L 4 2   < >  --    < > 3 0*   CHLORIDE mmol/L 101   < >  --    < > 106   CO2 mmol/L 28   < >  --    < > 25   BUN mg/dL 12   < >  --    < > 22   CREATININE mg/dL 0 98   < >  --    < > 1 17   ANION GAP mmol/L 3*   < >  --    < > 7   CALCIUM mg/dL 7 2*   < >  --    < > 7 6*   ALBUMIN g/dL  --   --  1 8*  --  2 0*   TOTAL BILIRUBIN mg/dL  --   --   --   --  0 56   ALK PHOS U/L  --   --   --   --  38*   ALT U/L  --   --   --   --  12   AST U/L  --   --   --   --  16   GLUCOSE RANDOM mg/dL 77   < >  --    < > 71    < > = values in this interval not displayed           Results from last 7 days Lab Units 06/16/22  2202   POC GLUCOSE mg/dl 115                 Imaging: I have reviewed pertinent imaging       Recent Cultures (last 7 days):           Last 24 Hours Medication List:   Current Facility-Administered Medications   Medication Dose Route Frequency Provider Last Rate    acetaminophen  650 mg Oral Q6H PRN Noe Walls MD      Adult TPN (CUSTOM BASE/STANDARD ELECTROLYTE)   Intravenous Continuous TPN Jovita Hylton DO 63 1 mL/hr at 06/22/22 2105    clonazePAM  1 mg Oral HS Noe Walls MD      COVID-19 Moderna vaccine booster  0 25 mL Intramuscular Once Jennie Jesus DO      folic acid  1 mg Oral Daily Jennie Jesus DO      levothyroxine  112 mcg Oral Early Morning Noe Walls MD      misoprostol  200 mcg Oral 4x Daily (with meals and at bedtime) Noe Walls MD      ondansetron  4 mg Intravenous Q4H PRN Noe Walls MD      pantoprazole  40 mg Oral BID AC Jennie Jesus DO      potassium chloride  40 mEq Oral BID Noe Walls MD      sucralfate  1 g Oral 4x Daily (AC & HS) Noe Walls MD      thiamine  100 mg Oral Daily Jennie Jesus DO          Today, Patient Was Seen By: Jennie Jesus DO    ** Please Note: Dictation voice to text software may have been used in the creation of this document   **

## 2022-06-23 NOTE — ASSESSMENT & PLAN NOTE
· Continue Klonopin  · Seen by neuropsychiatry 6/13 deemed patient not to have capacity to make decisions  · Rd Wang is POA

## 2022-06-23 NOTE — PLAN OF CARE
PT currently meeting estimated needs via TPN, also on bariatric full liquids, taking in clear broth, sugar free jello and sugar free [place] ice  PT c/o being hungry and wanting to try more solid choices, consider adjusting diet to full liquid versus bariatric full liquid which will allow for a little more variety such as cream of wheat and yogurt but can still order sugar free jello and sugar free [place] ice versus the bariatric full liquid which is very limited, also consider adding ensure max oral nutrition supplement  Will continue to monitor for diet advancement and tolerance  Reviewed labs: 6/23 Na 132, 6/20 folate and vit B12 elevated, consider d/cing folic acid as folate level elevated, 18 5  If Na continues to decrease adjust TPN to AA15 500mL, D40 600mL, lipids 20% 200mL provides total of 1516cal, 75g pro, 1300mL, lipid load  72g/kg/day, glucose load 3 0mg/kg/min  Problem: Nutrition/Hydration-ADULT  Goal: Nutrient/Hydration intake appropriate for improving, restoring or maintaining nutritional needs  Description: Monitor and assess patient's nutrition/hydration status for malnutrition  Collaborate with interdisciplinary team and initiate plan and interventions as ordered  Monitor patient's weight and dietary intake as ordered or per policy  Utilize nutrition screening tool and intervene as necessary  Determine patient's food preferences and provide high-protein, high-caloric foods as appropriate       INTERVENTIONS:  - Monitor oral intake, urinary output, labs, and treatment plans  - Assess nutrition and hydration status and recommend course of action  - Evaluate amount of meals eaten  - Assist patient with eating if necessary   - Allow adequate time for meals  - Recommend/ encourage appropriate diets, oral nutritional supplements, and vitamin/mineral supplements  - Order, calculate, and assess calorie counts as needed  - Recommend, monitor, and adjust tube feedings and TPN/PPN based on assessed needs  - Assess need for intravenous fluids  - Provide specific nutrition/hydration education as appropriate  - Include patient/family/caregiver in decisions related to nutrition  Outcome: Progressing

## 2022-06-24 LAB
25(OH)D2 SERPL-MCNC: <1 NG/ML
25(OH)D3 SERPL-MCNC: 28 NG/ML
25(OH)D3+25(OH)D2 SERPL-MCNC: 28 NG/ML
ANION GAP SERPL CALCULATED.3IONS-SCNC: 3 MMOL/L (ref 4–13)
BUN SERPL-MCNC: 16 MG/DL (ref 5–25)
CALCIUM SERPL-MCNC: 7.4 MG/DL (ref 8.3–10.1)
CHLORIDE SERPL-SCNC: 105 MMOL/L (ref 100–108)
CO2 SERPL-SCNC: 27 MMOL/L (ref 21–32)
CREAT SERPL-MCNC: 1.01 MG/DL (ref 0.6–1.3)
ERYTHROCYTE [DISTWIDTH] IN BLOOD BY AUTOMATED COUNT: 18.6 % (ref 11.6–15.1)
GFR SERPL CREATININE-BSD FRML MDRD: 53 ML/MIN/1.73SQ M
GLUCOSE SERPL-MCNC: 74 MG/DL (ref 65–140)
HCT VFR BLD AUTO: 22.7 % (ref 34.8–46.1)
HGB BLD-MCNC: 7.2 G/DL (ref 11.5–15.4)
MAGNESIUM SERPL-MCNC: 1.9 MG/DL (ref 1.6–2.6)
MCH RBC QN AUTO: 30.3 PG (ref 26.8–34.3)
MCHC RBC AUTO-ENTMCNC: 31.7 G/DL (ref 31.4–37.4)
MCV RBC AUTO: 95 FL (ref 82–98)
PHOSPHATE SERPL-MCNC: 3.2 MG/DL (ref 2.3–4.1)
PLATELET # BLD AUTO: 360 THOUSANDS/UL (ref 149–390)
PMV BLD AUTO: 9.6 FL (ref 8.9–12.7)
POTASSIUM SERPL-SCNC: 4.5 MMOL/L (ref 3.5–5.3)
RBC # BLD AUTO: 2.38 MILLION/UL (ref 3.81–5.12)
SODIUM SERPL-SCNC: 135 MMOL/L (ref 136–145)
VIT B1 BLD-SCNC: 169.8 NMOL/L (ref 66.5–200)
WBC # BLD AUTO: 7.59 THOUSAND/UL (ref 4.31–10.16)

## 2022-06-24 PROCEDURE — 80048 BASIC METABOLIC PNL TOTAL CA: CPT | Performed by: INTERNAL MEDICINE

## 2022-06-24 PROCEDURE — 85027 COMPLETE CBC AUTOMATED: CPT | Performed by: INTERNAL MEDICINE

## 2022-06-24 PROCEDURE — 83735 ASSAY OF MAGNESIUM: CPT | Performed by: INTERNAL MEDICINE

## 2022-06-24 PROCEDURE — 99232 SBSQ HOSP IP/OBS MODERATE 35: CPT | Performed by: INTERNAL MEDICINE

## 2022-06-24 PROCEDURE — 84100 ASSAY OF PHOSPHORUS: CPT | Performed by: INTERNAL MEDICINE

## 2022-06-24 RX ADMIN — SUCRALFATE 1 G: 1 TABLET ORAL at 17:19

## 2022-06-24 RX ADMIN — MISOPROSTOL 200 MCG: 200 TABLET ORAL at 08:46

## 2022-06-24 RX ADMIN — THIAMINE HCL TAB 100 MG 100 MG: 100 TAB at 08:46

## 2022-06-24 RX ADMIN — CLONAZEPAM 1 MG: 1 TABLET ORAL at 22:01

## 2022-06-24 RX ADMIN — MISOPROSTOL 200 MCG: 200 TABLET ORAL at 12:01

## 2022-06-24 RX ADMIN — POTASSIUM CHLORIDE 40 MEQ: 20 TABLET, EXTENDED RELEASE ORAL at 08:46

## 2022-06-24 RX ADMIN — LEVOTHYROXINE SODIUM 112 MCG: 112 TABLET ORAL at 06:16

## 2022-06-24 RX ADMIN — POTASSIUM CHLORIDE 40 MEQ: 20 TABLET, EXTENDED RELEASE ORAL at 17:19

## 2022-06-24 RX ADMIN — MISOPROSTOL 200 MCG: 200 TABLET ORAL at 17:19

## 2022-06-24 RX ADMIN — SUCRALFATE 1 G: 1 TABLET ORAL at 06:16

## 2022-06-24 RX ADMIN — Medication: at 22:05

## 2022-06-24 RX ADMIN — FOLIC ACID 1 MG: 1 TABLET ORAL at 08:46

## 2022-06-24 RX ADMIN — PANTOPRAZOLE SODIUM 40 MG: 40 TABLET, DELAYED RELEASE ORAL at 17:19

## 2022-06-24 RX ADMIN — ONDANSETRON 4 MG: 2 INJECTION INTRAMUSCULAR; INTRAVENOUS at 22:01

## 2022-06-24 RX ADMIN — SUCRALFATE 1 G: 1 TABLET ORAL at 22:01

## 2022-06-24 RX ADMIN — PANTOPRAZOLE SODIUM 40 MG: 40 TABLET, DELAYED RELEASE ORAL at 06:15

## 2022-06-24 RX ADMIN — SUCRALFATE 1 G: 1 TABLET ORAL at 12:01

## 2022-06-24 RX ADMIN — MISOPROSTOL 200 MCG: 200 TABLET ORAL at 22:01

## 2022-06-24 NOTE — ASSESSMENT & PLAN NOTE
Malnutrition Findings:   Adult Malnutrition type: Chronic illness  BMI Findings: Body mass index is 20 66 kg/m²       TPN initiated  Full bariatric diet

## 2022-06-24 NOTE — PLAN OF CARE
Problem: Prexisting or High Potential for Compromised Skin Integrity  Goal: Skin integrity is maintained or improved  Description: INTERVENTIONS:  - Identify patients at risk for skin breakdown  - Assess and monitor skin integrity  - Assess and monitor nutrition and hydration status  - Monitor labs   - Assess for incontinence   - Turn and reposition patient  - Assist with mobility/ambulation  - Relieve pressure over bony prominences  - Avoid friction and shearing  - Provide appropriate hygiene as needed including keeping skin clean and dry  - Evaluate need for skin moisturizer/barrier cream  - Collaborate with interdisciplinary team   - Patient/family teaching  - Consider wound care consult   Outcome: Progressing     Problem: MOBILITY - ADULT  Goal: Maintain or return to baseline ADL function  Description: INTERVENTIONS:  -  Assess patient's ability to carry out ADLs; assess patient's baseline for ADL function and identify physical deficits which impact ability to perform ADLs (bathing, care of mouth/teeth, toileting, grooming, dressing, etc )  - Assess/evaluate cause of self-care deficits   - Assess range of motion  - Assess patient's mobility; develop plan if impaired  - Assess patient's need for assistive devices and provide as appropriate  - Encourage maximum independence but intervene and supervise when necessary  - Involve family in performance of ADLs  - Assess for home care needs following discharge   - Consider OT consult to assist with ADL evaluation and planning for discharge  - Provide patient education as appropriate  Outcome: Progressing  Goal: Maintains/Returns to pre admission functional level  Description: INTERVENTIONS:  - Perform BMAT or MOVE assessment daily    - Set and communicate daily mobility goal to care team and patient/family/caregiver  - Collaborate with rehabilitation services on mobility goals if consulted  - Perform Range of Motion 3 times a day    - Reposition patient every 2 hours   - Dangle patient 3 times a day  - Stand patient 3 times a day  - Out of bed for toileting  - Record patient progress and toleration of activity level   Outcome: Progressing     Problem: Potential for Falls  Goal: Patient will remain free of falls  Description: INTERVENTIONS:  - Educate patient/family on patient safety including physical limitations  - Instruct patient to call for assistance with activity   - Consult OT/PT to assist with strengthening/mobility   - Keep Call bell within reach  - Keep bed low and locked with side rails adjusted as appropriate  - Keep care items and personal belongings within reach  - Initiate and maintain comfort rounds  - Make Fall Risk Sign visible to staff  - Offer Toileting every 2 Hours, in advance of need  - Initiate/Maintain bed alarm  - Obtain necessary fall risk management equipment  - Apply yellow socks and bracelet for high fall risk patients  - Consider moving patient to room near nurses station  Outcome: Progressing     Problem: PAIN - ADULT  Goal: Verbalizes/displays adequate comfort level or baseline comfort level  Description: Interventions:  - Encourage patient to monitor pain and request assistance  - Assess pain using appropriate pain scale  - Administer analgesics based on type and severity of pain and evaluate response  - Implement non-pharmacological measures as appropriate and evaluate response  - Consider cultural and social influences on pain and pain management  - Notify physician/advanced practitioner if interventions unsuccessful or patient reports new pain  Outcome: Progressing     Problem: INFECTION - ADULT  Goal: Absence or prevention of progression during hospitalization  Description: INTERVENTIONS:  - Assess and monitor for signs and symptoms of infection  - Monitor lab/diagnostic results  - Monitor all insertion sites, i e  indwelling lines, tubes, and drains  - Monitor endotracheal if appropriate and nasal secretions for changes in amount and color  - [place] appropriate cooling/warming therapies per order  - Administer medications as ordered  - Instruct and encourage patient and family to use good hand hygiene technique  - Identify and instruct in appropriate isolation precautions for identified infection/condition  Outcome: Progressing  Goal: Absence of fever/infection during neutropenic period  Description: INTERVENTIONS:  - Monitor WBC    Outcome: Progressing     Problem: SAFETY ADULT  Goal: Maintain or return to baseline ADL function  Description: INTERVENTIONS:  -  Assess patient's ability to carry out ADLs; assess patient's baseline for ADL function and identify physical deficits which impact ability to perform ADLs (bathing, care of mouth/teeth, toileting, grooming, dressing, etc )  - Assess/evaluate cause of self-care deficits   - Assess range of motion  - Assess patient's mobility; develop plan if impaired  - Assess patient's need for assistive devices and provide as appropriate  - Encourage maximum independence but intervene and supervise when necessary  - Involve family in performance of ADLs  - Assess for home care needs following discharge   - Consider OT consult to assist with ADL evaluation and planning for discharge  - Provide patient education as appropriate  Outcome: Progressing  Goal: Maintains/Returns to pre admission functional level  Description: INTERVENTIONS:  - Perform BMAT or MOVE assessment daily    - Set and communicate daily mobility goal to care team and patient/family/caregiver  - Collaborate with rehabilitation services on mobility goals if consulted  - Perform Range of Motion 3 times a day  - Reposition patient every 2 hours    - Dangle patient 3 times a day  - Stand patient 3 times a day  - Out of bed for toileting  - Record patient progress and toleration of activity level   Outcome: Progressing  Goal: Patient will remain free of falls  Description: INTERVENTIONS:  - Educate patient/family on patient safety including physical limitations  - Instruct patient to call for assistance with activity   - Consult OT/PT to assist with strengthening/mobility   - Keep Call bell within reach  - Keep bed low and locked with side rails adjusted as appropriate  - Keep care items and personal belongings within reach  - Initiate and maintain comfort rounds  - Make Fall Risk Sign visible to staff  - Offer Toileting every 2 Hours, in advance of need  - Initiate/Maintain bed alarm  - Obtain necessary fall risk management equipment  - Apply yellow socks and bracelet for high fall risk patients  - Consider moving patient to room near nurses station  Outcome: Progressing     Problem: DISCHARGE PLANNING  Goal: Discharge to home or other facility with appropriate resources  Description: INTERVENTIONS:  - Identify barriers to discharge w/patient and caregiver  - Arrange for needed discharge resources and transportation as appropriate  - Identify discharge learning needs (meds, wound care, etc )  - Arrange for interpretive services to assist at discharge as needed  - Refer to Case Management Department for coordinating discharge planning if the patient needs post-hospital services based on physician/advanced practitioner order or complex needs related to functional status, cognitive ability, or social support system  Outcome: Progressing     Problem: Knowledge Deficit  Goal: Patient/family/caregiver demonstrates understanding of disease process, treatment plan, medications, and discharge instructions  Description: Complete learning assessment and assess knowledge base    Interventions:  - Provide teaching at level of understanding  - Provide teaching via preferred learning methods  Outcome: Progressing     Problem: GASTROINTESTINAL - ADULT  Goal: Minimal or absence of nausea and/or vomiting  Description: INTERVENTIONS:  - Administer IV fluids if ordered to ensure adequate hydration  - Maintain NPO status until nausea and vomiting are resolved  - Nasogastric tube if ordered  - Administer ordered antiemetic medications as needed  - Provide nonpharmacologic comfort measures as appropriate  - Advance diet as tolerated, if ordered  - Consider nutrition services referral to assist patient with adequate nutrition and appropriate food choices  Outcome: Progressing  Goal: Maintains or returns to baseline bowel function  Description: INTERVENTIONS:  - Assess bowel function  - Encourage oral fluids to ensure adequate hydration  - Administer IV fluids if ordered to ensure adequate hydration  - Administer ordered medications as needed  - Encourage mobilization and activity  - Consider nutritional services referral to assist patient with adequate nutrition and appropriate food choices  Outcome: Progressing  Goal: Maintains adequate nutritional intake  Description: INTERVENTIONS:  - Monitor percentage of each meal consumed  - Identify factors contributing to decreased intake, treat as appropriate  - Assist with meals as needed  - Monitor I&O, weight, and lab values if indicated  - Obtain nutrition services referral as needed  Outcome: Progressing  Goal: Establish and maintain optimal ostomy function  Description: INTERVENTIONS:  - Assess bowel function  - Encourage oral fluids to ensure adequate hydration  - Administer IV fluids if ordered to ensure adequate hydration   - Administer ordered medications as needed  - Encourage mobilization and activity  - Nutrition services referral to assist patient with appropriate food choices  - Assess stoma site  - Consider wound care consult   Outcome: Progressing  Goal: Oral mucous membranes remain intact  Description: INTERVENTIONS  - Assess oral mucosa and hygiene practices  - Implement preventative oral hygiene regimen  - Implement oral medicated treatments as ordered  - Initiate Nutrition services referral as needed  Outcome: Progressing     Problem: HEMATOLOGIC - ADULT  Goal: Maintains hematologic stability  Description: INTERVENTIONS  - Assess for signs and symptoms of bleeding or hemorrhage  - Monitor labs  - Administer supportive blood products/factors as ordered and appropriate  Outcome: Progressing     Problem: Nutrition/Hydration-ADULT  Goal: Nutrient/Hydration intake appropriate for improving, restoring or maintaining nutritional needs  Description: Monitor and assess patient's nutrition/hydration status for malnutrition  Collaborate with interdisciplinary team and initiate plan and interventions as ordered  Monitor patient's weight and dietary intake as ordered or per policy  Utilize nutrition screening tool and intervene as necessary  Determine patient's food preferences and provide high-protein, high-caloric foods as appropriate       INTERVENTIONS:  - Monitor oral intake, urinary output, labs, and treatment plans  - Assess nutrition and hydration status and recommend course of action  - Evaluate amount of meals eaten  - Assist patient with eating if necessary   - Allow adequate time for meals  - Recommend/ encourage appropriate diets, oral nutritional supplements, and vitamin/mineral supplements  - Order, calculate, and assess calorie counts as needed  - Recommend, monitor, and adjust tube feedings and TPN/PPN based on assessed needs  - Assess need for intravenous fluids  - Provide specific nutrition/hydration education as appropriate  - Include patient/family/caregiver in decisions related to nutrition  Outcome: Progressing

## 2022-06-24 NOTE — PROGRESS NOTES
[facility]  Progress Note - Ricky Harden 1945, 64 y o  female MRN: [id number]  Unit/Bed#: E5 -01 Encounter: [id number]  Primary Care Provider: Anne Lama MD   Date and time admitted to hospital: 6/16/2022  9:16 PM    Gastric ulcer  Assessment & Plan  · EGD 6/12 revealed single large deep irregular benign appearing ulcer gastrojejunal anastomoses with adherent clot status post 2 clips, injected epinephrine and hemostasis achieved  · Emergent EGD 06/15/2022 revealed single large deep ulcer in gastrojejunal anastomosis with nonbleeding visible vessel, induced coagulation and hemostasis achieved with cautery and epinephrine injection  · Bariatric surgery follow-up appreciated  · Full bariatric diet  · Continue Protonix b i d , Carafate, Cytotec  · PICC line placed, initiated on TPN  Management per bariatrics   · Plan for nutritional optimization and potential outpatient revision  Plan for repeat endoscopy 6 weeks after discharge      * Acute blood loss anemia  Assessment & Plan  This is a 59-year-old female with history of Savage-en-Y gastric bypass, symptomatic anemia, gastric ulcer, hypothyroidism, depression initially admitted on 06/11/2022 to [facility] for hematemesis and blood per rectum Secondary to marginal/anastomotic ulcer status post EGD x2  · Status post total 6 unit PRBCs  · Hemoglobin stable over last 4 days  · Iron panel with anemia of chronic disease, will transfuse p r n  Until revision  · Transfuse further as needed to maintain greater than 7  · Appreciate GI, bariatric recommendations    Severe protein-calorie malnutrition ([address] )  Assessment & Plan  Malnutrition Findings:   Adult Malnutrition type: Chronic illness  BMI Findings: Body mass index is 20 66 kg/m²       TPN initiated  Full bariatric diet    Ambulatory dysfunction  Assessment & Plan  Appreciate PT/OT recommendations  Awaiting transfer to rehab    H/O bariatric surgery - bypass  Assessment & Plan  · 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 at [facility]  · Outpatient bariatric follow-up    Hypothyroidism  Assessment & Plan  · Continue Synthroid      VTE Pharmacologic Prophylaxis:  On hold due to high risk for bleeding    Patient Centered Rounds:  Patient care rounds were performed with nursing    Time Spent for Care: 30  More than 50% of total time spent on counseling and coordination of care as described above  Current Length of Stay: 8 day(s)    Current Patient Status: Inpatient   Certification Statement: The patient will continue to require additional inpatient hospital stay due to awaiting placement to rehab    Discharge Plan:  Pending placement to rehab    Code Status: Level 1 - Full Code      Subjective:   Patient seen and evaluated at bedside  She is upset due to being cold related to incontinence  We discussed timed voiding  Discussed plan with Nursing  Objective:     Vitals:   Temp (24hrs), Av 3 °F (36 8 °C), Min:97 2 °F (36 2 °C), Max:98 8 °F (37 1 °C)    Temp:  [97 2 °F (36 2 °C)-98 8 °F (37 1 °C)] 97 2 °F (36 2 °C)  HR:  [73-81] 76  Resp:  [17-18] 17  BP: (109-115)/(50-55) 115/53  SpO2:  [97 %-100 %] 100 %  Body mass index is 20 66 kg/m²  Input and Output Summary (last 24 hours): Intake/Output Summary (Last 24 hours) at 2022 1338  Last data filed at 2022 0537  Gross per 24 hour   Intake --   Output 152 ml   Net -152 ml       Physical Exam:     Physical Exam  Vitals reviewed  Constitutional:       General: She is not in acute distress  Appearance: She is well-developed  She is not ill-appearing, toxic-appearing or diaphoretic  Comments: Thin appearing   HENT:      Head: Normocephalic and atraumatic  Eyes:      General: No scleral icterus  Conjunctiva/sclera: Conjunctivae normal    Cardiovascular:      Rate and Rhythm: Normal rate and regular rhythm  Heart sounds: Normal heart sounds     Pulmonary: Effort: Pulmonary effort is normal  No respiratory distress  Breath sounds: Normal breath sounds  No wheezing or rales  Abdominal:      General: There is no distension  Palpations: Abdomen is soft  Tenderness: There is no abdominal tenderness  There is no guarding or rebound  Musculoskeletal:         General: No swelling, tenderness or deformity  Skin:     General: Skin is warm and dry  Neurological:      General: No focal deficit present  Mental Status: She is alert  Mental status is at baseline  Psychiatric:      Comments: Upset         Additional Data:     Labs: I have reviewed pertinent results     Results from last 7 days   Lab Units 06/24/22  0622 06/20/22  1857 06/20/22  0747   WBC Thousand/uL 7 59   < > 9 84   HEMOGLOBIN g/dL 7 2*   < > 8 0*   HEMATOCRIT % 22 7*   < > 25 0*   PLATELETS Thousands/uL 360   < > 489*   NEUTROS PCT %  --   --  59   LYMPHS PCT %  --   --  19   MONOS PCT %  --   --  11   EOS PCT %  --   --  10*    < > = values in this interval not displayed  Results from last 7 days   Lab Units 06/24/22  0622 06/21/22  0525 06/20/22  1318   SODIUM mmol/L 135*   < >  --    POTASSIUM mmol/L 4 5   < >  --    CHLORIDE mmol/L 105   < >  --    CO2 mmol/L 27   < >  --    BUN mg/dL 16   < >  --    CREATININE mg/dL 1 01   < >  --    ANION GAP mmol/L 3*   < >  --    CALCIUM mg/dL 7 4*   < >  --    ALBUMIN g/dL  --   --  1 8*   GLUCOSE RANDOM mg/dL 74   < >  --     < > = values in this interval not displayed                           Imaging: I have reviewed pertinent imaging       Recent Cultures (last 7 days):           Last 24 Hours Medication List:   Current Facility-Administered Medications   Medication Dose Route Frequency Provider Last Rate    acetaminophen  650 mg Oral Q6H PRN Noe Walls MD      Adult TPN (CUSTOM BASE/CUSTOM ELECTROLYTE)   Intravenous Continuous TPN Jovita Hylton DO 63 2 mL/hr at 06/23/22 2136    Adult TPN (CUSTOM BASE/CUSTOM ELECTROLYTE) Intravenous Continuous TPN Jovita Hylton DO      clonazePAM  1 mg Oral HS Hazel Mann, MD      folic acid  1 mg Oral Daily Nate Carroll DO      levothyroxine  112 mcg Oral Early Morning Hazel Mann MD      misoprostol  200 mcg Oral 4x Daily (with meals and at bedtime) Hazel Mann MD      ondansetron  4 mg Intravenous Q4H PRN Hazel Mann, MD      pantoprazole  40 mg Oral BID AC Nate Carroll DO      potassium chloride  40 mEq Oral BID Hazel Mann MD      sucralfate  1 g Oral 4x Daily (AC & HS) Hazel Mann MD      thiamine  100 mg Oral Daily Nate Carroll DO          Today, Patient Was Seen By: Nate Carroll DO    ** Please Note: Dictation voice to text software may have been used in the creation of this document   **

## 2022-06-24 NOTE — ASSESSMENT & PLAN NOTE
· 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 at [facility]  · Outpatient bariatric follow-up

## 2022-06-24 NOTE — CASE MANAGEMENT
Case Management Discharge Planning Note    Patient name Mansoor Guerrero  [facility]  /[address] MRN [id number]  : 1945 Date 2022       Current Admission Date: 2022  Current Admission Diagnosis:Acute blood loss anemia   Patient Active Problem List    Diagnosis Date Noted    Hypocalcemia 2022    Severe protein-calorie malnutrition ([address] ) 2022    Bilateral leg edema 5089    Neutrophilic leukocytosis     Ambulatory dysfunction 2022    Acute blood loss anemia 2022    Gastric ulcer 2022    Fever 2022    Anemia 2022    Dyspnea on exertion 2022    Generalized weakness 2022    Hyponatremia 2022    Abnormal CT scan 2022    H/O bariatric surgery - bypass 2022    Cerumen debris on tympanic membrane of right ear 2022    Nasal congestion 2022    Bilateral hearing loss 2022    Fibromyalgia syndrome 2021    Osteopenia of both forearms 2021    Medicare annual wellness visit, subsequent 2021    Shortness of breath 2021    Acute bronchitis 2021    COVID-19 2021    Dysphasia 2020    Epigastric pain 2020    Hypothyroidism 2020    Gastroesophageal reflux disease without esophagitis 2020    Depression 2020    Fibromyalgia, primary 2020    Iron deficiency 2020    Numbness of foot 2020      LOS (days): 8  Geometric Mean LOS (GMLOS) (days): 4 40  Days to GMLOS:-3 4     OBJECTIVE:  Risk of Unplanned Readmission Score: 18 57         Current admission status: Inpatient   Preferred Pharmacy:   [facility], [address] [place] [address] [address] [facility] [address]  Phone: [phone] Fax: [phone]    Primary Care Provider: Akiko Lopez MD    Primary Insurance: [facility]  Secondary Insurance:     DISCHARGE DETAILS:    CM spoke w/ pts brother Marium Rasheed to discuss available facilities who are accepting Pt for STR  Pt agreeable to discharge to [address] [place] [address] location)  CM in communication with [address] [place] [address]  Auth will need to be resubmitted as an Thee Meade was already submitted for STR at [facility]  CM communicated this need to the CM D/C Support Team  Request for replacement auth sent  Waiting for final approval      CM continues to follow

## 2022-06-24 NOTE — PLAN OF CARE
Problem: Prexisting or High Potential for Compromised Skin Integrity  Goal: Skin integrity is maintained or improved  Description: INTERVENTIONS:  - Identify patients at risk for skin breakdown  - Assess and monitor skin integrity  - Assess and monitor nutrition and hydration status  - Monitor labs   - Assess for incontinence   - Turn and reposition patient  - Assist with mobility/ambulation  - Relieve pressure over bony prominences  - Avoid friction and shearing  - Provide appropriate hygiene as needed including keeping skin clean and dry  - Evaluate need for skin moisturizer/barrier cream  - Collaborate with interdisciplinary team   - Patient/family teaching  - Consider wound care consult   Outcome: Progressing     Problem: MOBILITY - ADULT  Goal: Maintain or return to baseline ADL function  Description: INTERVENTIONS:  -  Assess patient's ability to carry out ADLs; assess patient's baseline for ADL function and identify physical deficits which impact ability to perform ADLs (bathing, care of mouth/teeth, toileting, grooming, dressing, etc )  - Assess/evaluate cause of self-care deficits   - Assess range of motion  - Assess patient's mobility; develop plan if impaired  - Assess patient's need for assistive devices and provide as appropriate  - Encourage maximum independence but intervene and supervise when necessary  - Involve family in performance of ADLs  - Assess for home care needs following discharge   - Consider OT consult to assist with ADL evaluation and planning for discharge  - Provide patient education as appropriate  Outcome: Progressing  Goal: Maintains/Returns to pre admission functional level  Description: INTERVENTIONS:  - Perform BMAT or MOVE assessment daily    - Set and communicate daily mobility goal to care team and patient/family/caregiver  - Collaborate with rehabilitation services on mobility goals if consulted  - Perform Range of Motion 3 times a day    - Reposition patient every 2 hours   - Dangle patient 3 times a day  - Stand patient 3 times a day  - Out of bed for toileting  - Record patient progress and toleration of activity level   Outcome: Progressing     Problem: Potential for Falls  Goal: Patient will remain free of falls  Description: INTERVENTIONS:  - Educate patient/family on patient safety including physical limitations  - Instruct patient to call for assistance with activity   - Consult OT/PT to assist with strengthening/mobility   - Keep Call bell within reach  - Keep bed low and locked with side rails adjusted as appropriate  - Keep care items and personal belongings within reach  - Initiate and maintain comfort rounds  - Make Fall Risk Sign visible to staff  - Offer Toileting every 2 Hours, in advance of need  - Initiate/Maintain bed alarm  - Obtain necessary fall risk management equipment  - Apply yellow socks and bracelet for high fall risk patients  - Consider moving patient to room near nurses station  Outcome: Progressing     Problem: PAIN - ADULT  Goal: Verbalizes/displays adequate comfort level or baseline comfort level  Description: Interventions:  - Encourage patient to monitor pain and request assistance  - Assess pain using appropriate pain scale  - Administer analgesics based on type and severity of pain and evaluate response  - Implement non-pharmacological measures as appropriate and evaluate response  - Consider cultural and social influences on pain and pain management  - Notify physician/advanced practitioner if interventions unsuccessful or patient reports new pain  Outcome: Progressing     Problem: INFECTION - ADULT  Goal: Absence or prevention of progression during hospitalization  Description: INTERVENTIONS:  - Assess and monitor for signs and symptoms of infection  - Monitor lab/diagnostic results  - Monitor all insertion sites, i e  indwelling lines, tubes, and drains  - Monitor endotracheal if appropriate and nasal secretions for changes in amount and color  - [place] appropriate cooling/warming therapies per order  - Administer medications as ordered  - Instruct and encourage patient and family to use good hand hygiene technique  - Identify and instruct in appropriate isolation precautions for identified infection/condition  Outcome: Progressing  Goal: Absence of fever/infection during neutropenic period  Description: INTERVENTIONS:  - Monitor WBC    Outcome: Progressing     Problem: SAFETY ADULT  Goal: Maintain or return to baseline ADL function  Description: INTERVENTIONS:  -  Assess patient's ability to carry out ADLs; assess patient's baseline for ADL function and identify physical deficits which impact ability to perform ADLs (bathing, care of mouth/teeth, toileting, grooming, dressing, etc )  - Assess/evaluate cause of self-care deficits   - Assess range of motion  - Assess patient's mobility; develop plan if impaired  - Assess patient's need for assistive devices and provide as appropriate  - Encourage maximum independence but intervene and supervise when necessary  - Involve family in performance of ADLs  - Assess for home care needs following discharge   - Consider OT consult to assist with ADL evaluation and planning for discharge  - Provide patient education as appropriate  Outcome: Progressing  Goal: Maintains/Returns to pre admission functional level  Description: INTERVENTIONS:  - Perform BMAT or MOVE assessment daily    - Set and communicate daily mobility goal to care team and patient/family/caregiver  - Collaborate with rehabilitation services on mobility goals if consulted  - Perform Range of Motion 3 times a day  - Reposition patient every 2 hours    - Dangle patient 3 times a day  - Stand patient 3 times a day  - Out of bed for toileting  - Record patient progress and toleration of activity level   Outcome: Progressing  Goal: Patient will remain free of falls  Description: INTERVENTIONS:  - Educate patient/family on patient safety including physical limitations  - Instruct patient to call for assistance with activity   - Consult OT/PT to assist with strengthening/mobility   - Keep Call bell within reach  - Keep bed low and locked with side rails adjusted as appropriate  - Keep care items and personal belongings within reach  - Initiate and maintain comfort rounds  - Make Fall Risk Sign visible to staff  - Offer Toileting every 2 Hours, in advance of need  - Initiate/Maintain bed alarm  - Obtain necessary fall risk management equipment  - Apply yellow socks and bracelet for high fall risk patients  - Consider moving patient to room near nurses station  Outcome: Progressing     Problem: DISCHARGE PLANNING  Goal: Discharge to home or other facility with appropriate resources  Description: INTERVENTIONS:  - Identify barriers to discharge w/patient and caregiver  - Arrange for needed discharge resources and transportation as appropriate  - Identify discharge learning needs (meds, wound care, etc )  - Arrange for interpretive services to assist at discharge as needed  - Refer to Case Management Department for coordinating discharge planning if the patient needs post-hospital services based on physician/advanced practitioner order or complex needs related to functional status, cognitive ability, or social support system  Outcome: Progressing     Problem: Knowledge Deficit  Goal: Patient/family/caregiver demonstrates understanding of disease process, treatment plan, medications, and discharge instructions  Description: Complete learning assessment and assess knowledge base    Interventions:  - Provide teaching at level of understanding  - Provide teaching via preferred learning methods  Outcome: Progressing     Problem: GASTROINTESTINAL - ADULT  Goal: Minimal or absence of nausea and/or vomiting  Description: INTERVENTIONS:  - Administer IV fluids if ordered to ensure adequate hydration  - Maintain NPO status until nausea and vomiting are resolved  - Nasogastric tube if ordered  - Administer ordered antiemetic medications as needed  - Provide nonpharmacologic comfort measures as appropriate  - Advance diet as tolerated, if ordered  - Consider nutrition services referral to assist patient with adequate nutrition and appropriate food choices  Outcome: Progressing  Goal: Maintains or returns to baseline bowel function  Description: INTERVENTIONS:  - Assess bowel function  - Encourage oral fluids to ensure adequate hydration  - Administer IV fluids if ordered to ensure adequate hydration  - Administer ordered medications as needed  - Encourage mobilization and activity  - Consider nutritional services referral to assist patient with adequate nutrition and appropriate food choices  Outcome: Progressing  Goal: Maintains adequate nutritional intake  Description: INTERVENTIONS:  - Monitor percentage of each meal consumed  - Identify factors contributing to decreased intake, treat as appropriate  - Assist with meals as needed  - Monitor I&O, weight, and lab values if indicated  - Obtain nutrition services referral as needed  Outcome: Progressing  Goal: Establish and maintain optimal ostomy function  Description: INTERVENTIONS:  - Assess bowel function  - Encourage oral fluids to ensure adequate hydration  - Administer IV fluids if ordered to ensure adequate hydration   - Administer ordered medications as needed  - Encourage mobilization and activity  - Nutrition services referral to assist patient with appropriate food choices  - Assess stoma site  - Consider wound care consult   Outcome: Progressing  Goal: Oral mucous membranes remain intact  Description: INTERVENTIONS  - Assess oral mucosa and hygiene practices  - Implement preventative oral hygiene regimen  - Implement oral medicated treatments as ordered  - Initiate Nutrition services referral as needed  Outcome: Progressing     Problem: HEMATOLOGIC - ADULT  Goal: Maintains hematologic stability  Description: INTERVENTIONS  - Assess for signs and symptoms of bleeding or hemorrhage  - Monitor labs  - Administer supportive blood products/factors as ordered and appropriate  Outcome: Progressing     Problem: Nutrition/Hydration-ADULT  Goal: Nutrient/Hydration intake appropriate for improving, restoring or maintaining nutritional needs  Description: Monitor and assess patient's nutrition/hydration status for malnutrition  Collaborate with interdisciplinary team and initiate plan and interventions as ordered  Monitor patient's weight and dietary intake as ordered or per policy  Utilize nutrition screening tool and intervene as necessary  Determine patient's food preferences and provide high-protein, high-caloric foods as appropriate       INTERVENTIONS:  - Monitor oral intake, urinary output, labs, and treatment plans  - Assess nutrition and hydration status and recommend course of action  - Evaluate amount of meals eaten  - Assist patient with eating if necessary   - Allow adequate time for meals  - Recommend/ encourage appropriate diets, oral nutritional supplements, and vitamin/mineral supplements  - Order, calculate, and assess calorie counts as needed  - Recommend, monitor, and adjust tube feedings and TPN/PPN based on assessed needs  - Assess need for intravenous fluids  - Provide specific nutrition/hydration education as appropriate  - Include patient/family/caregiver in decisions related to nutrition  Outcome: Progressing

## 2022-06-24 NOTE — CASE MANAGEMENT
Case Management Discharge Planning Note    Patient name Christiano Montemaoyr  Location [address] MRN [id number]  : 1945 Date 2022       Current Admission Date: 2022  Current Admission Diagnosis:Acute blood loss anemia   Patient Active Problem List    Diagnosis Date Noted    Hypocalcemia 2022    Severe protein-calorie malnutrition ([address] ) 2022    Bilateral leg edema     Neutrophilic leukocytosis     Ambulatory dysfunction 2022    Acute blood loss anemia 2022    Gastric ulcer 2022    Fever 2022    Anemia 2022    Dyspnea on exertion 2022    Generalized weakness 2022    Hyponatremia 2022    Abnormal CT scan 2022    H/O bariatric surgery - bypass 2022    Cerumen debris on tympanic membrane of right ear 2022    Nasal congestion 2022    Bilateral hearing loss 2022    Fibromyalgia syndrome 2021    Osteopenia of both forearms 2021    Medicare annual wellness visit, subsequent 2021    Shortness of breath 2021    Acute bronchitis 2021    COVID-19 2021    Dysphasia 2020    Epigastric pain 2020    Hypothyroidism 2020    Gastroesophageal reflux disease without esophagitis 2020    Depression 2020    Fibromyalgia, primary 2020    Iron deficiency 2020    Numbness of foot 2020      LOS (days): 8  Geometric Mean LOS (GMLOS) (days): 4 40  Days to GMLOS:-3 3     OBJECTIVE:  Risk of Unplanned Readmission Score: 18 57         Current admission status: Inpatient   Preferred Pharmacy:   [facility] [address] [facility] [address] [place] [id number]  Phone: [phone] Fax: [phone]    Primary Care Provider: Shahzad Chung MD    Primary Insurance: [facility]  Secondary Insurance:     DISCHARGE DETAILS: Facility Insurance Auth Number: Gustavo Manjarrez Pending [id number] for patient to transfer to [facility] ([facility]#[id number])  Clinicals faxed to [phone]

## 2022-06-24 NOTE — ASSESSMENT & PLAN NOTE
This is a 75-year-old female with history of Savage-en-Y gastric bypass, symptomatic anemia, gastric ulcer, hypothyroidism, depression initially admitted on 06/11/2022 to [facility] for hematemesis and blood per rectum Secondary to marginal/anastomotic ulcer status post EGD x2  · Status post total 6 unit PRBCs  · Hemoglobin stable over last 4 days  · Iron panel with anemia of chronic disease, will transfuse p r n   Until revision  · Transfuse further as needed to maintain greater than 7  · Appreciate GI, bariatric recommendations

## 2022-06-25 LAB
ABO GROUP BLD: NORMAL
ANION GAP SERPL CALCULATED.3IONS-SCNC: 5 MMOL/L (ref 4–13)
BLD GP AB SCN SERPL QL: NEGATIVE
BUN SERPL-MCNC: 16 MG/DL (ref 5–25)
CALCIUM SERPL-MCNC: 7.1 MG/DL (ref 8.3–10.1)
CHLORIDE SERPL-SCNC: 105 MMOL/L (ref 100–108)
CO2 SERPL-SCNC: 24 MMOL/L (ref 21–32)
CREAT SERPL-MCNC: 0.95 MG/DL (ref 0.6–1.3)
ERYTHROCYTE [DISTWIDTH] IN BLOOD BY AUTOMATED COUNT: 18.8 % (ref 11.6–15.1)
ERYTHROCYTE [DISTWIDTH] IN BLOOD BY AUTOMATED COUNT: 18.8 % (ref 11.6–15.1)
GFR SERPL CREATININE-BSD FRML MDRD: 57 ML/MIN/1.73SQ M
GLUCOSE SERPL-MCNC: 81 MG/DL (ref 65–140)
HCT VFR BLD AUTO: 21.3 % (ref 34.8–46.1)
HCT VFR BLD AUTO: 21.5 % (ref 34.8–46.1)
HGB BLD-MCNC: 6.6 G/DL (ref 11.5–15.4)
HGB BLD-MCNC: 6.8 G/DL (ref 11.5–15.4)
MCH RBC QN AUTO: 30 PG (ref 26.8–34.3)
MCH RBC QN AUTO: 30.8 PG (ref 26.8–34.3)
MCHC RBC AUTO-ENTMCNC: 31 G/DL (ref 31.4–37.4)
MCHC RBC AUTO-ENTMCNC: 31.6 G/DL (ref 31.4–37.4)
MCV RBC AUTO: 97 FL (ref 82–98)
MCV RBC AUTO: 97 FL (ref 82–98)
PLATELET # BLD AUTO: 322 THOUSANDS/UL (ref 149–390)
PLATELET # BLD AUTO: 342 THOUSANDS/UL (ref 149–390)
PMV BLD AUTO: 10 FL (ref 8.9–12.7)
PMV BLD AUTO: 10 FL (ref 8.9–12.7)
POTASSIUM SERPL-SCNC: 4.4 MMOL/L (ref 3.5–5.3)
RBC # BLD AUTO: 2.2 MILLION/UL (ref 3.81–5.12)
RBC # BLD AUTO: 2.21 MILLION/UL (ref 3.81–5.12)
RH BLD: POSITIVE
SODIUM SERPL-SCNC: 134 MMOL/L (ref 136–145)
SPECIMEN EXPIRATION DATE: NORMAL
WBC # BLD AUTO: 6.91 THOUSAND/UL (ref 4.31–10.16)
WBC # BLD AUTO: 7.08 THOUSAND/UL (ref 4.31–10.16)

## 2022-06-25 PROCEDURE — 30233N1 TRANSFUSION OF NONAUTOLOGOUS RED BLOOD CELLS INTO PERIPHERAL VEIN, PERCUTANEOUS APPROACH: ICD-10-PCS | Performed by: INTERNAL MEDICINE

## 2022-06-25 PROCEDURE — P9040 RBC LEUKOREDUCED IRRADIATED: HCPCS

## 2022-06-25 PROCEDURE — 99232 SBSQ HOSP IP/OBS MODERATE 35: CPT | Performed by: INTERNAL MEDICINE

## 2022-06-25 PROCEDURE — 80048 BASIC METABOLIC PNL TOTAL CA: CPT | Performed by: SURGERY

## 2022-06-25 PROCEDURE — 86850 RBC ANTIBODY SCREEN: CPT | Performed by: NURSE PRACTITIONER

## 2022-06-25 PROCEDURE — 86901 BLOOD TYPING SEROLOGIC RH(D): CPT | Performed by: NURSE PRACTITIONER

## 2022-06-25 PROCEDURE — 85027 COMPLETE CBC AUTOMATED: CPT | Performed by: SURGERY

## 2022-06-25 PROCEDURE — 99232 SBSQ HOSP IP/OBS MODERATE 35: CPT | Performed by: SURGERY

## 2022-06-25 PROCEDURE — 86900 BLOOD TYPING SEROLOGIC ABO: CPT | Performed by: NURSE PRACTITIONER

## 2022-06-25 PROCEDURE — 86920 COMPATIBILITY TEST SPIN: CPT

## 2022-06-25 RX ADMIN — SUCRALFATE 1 G: 1 TABLET ORAL at 09:18

## 2022-06-25 RX ADMIN — POTASSIUM CHLORIDE 40 MEQ: 20 TABLET, EXTENDED RELEASE ORAL at 17:21

## 2022-06-25 RX ADMIN — POTASSIUM CHLORIDE 40 MEQ: 20 TABLET, EXTENDED RELEASE ORAL at 09:18

## 2022-06-25 RX ADMIN — PANTOPRAZOLE SODIUM 40 MG: 40 TABLET, DELAYED RELEASE ORAL at 09:18

## 2022-06-25 RX ADMIN — PANTOPRAZOLE SODIUM 40 MG: 40 TABLET, DELAYED RELEASE ORAL at 16:46

## 2022-06-25 RX ADMIN — FOLIC ACID 1 MG: 1 TABLET ORAL at 09:18

## 2022-06-25 RX ADMIN — MISOPROSTOL 200 MCG: 200 TABLET ORAL at 16:46

## 2022-06-25 RX ADMIN — Medication: at 20:51

## 2022-06-25 RX ADMIN — SUCRALFATE 1 G: 1 TABLET ORAL at 16:46

## 2022-06-25 RX ADMIN — MISOPROSTOL 200 MCG: 200 TABLET ORAL at 21:42

## 2022-06-25 RX ADMIN — SUCRALFATE 1 G: 1 TABLET ORAL at 11:56

## 2022-06-25 RX ADMIN — THIAMINE HCL TAB 100 MG 100 MG: 100 TAB at 09:18

## 2022-06-25 RX ADMIN — SUCRALFATE 1 G: 1 TABLET ORAL at 21:42

## 2022-06-25 RX ADMIN — MISOPROSTOL 200 MCG: 200 TABLET ORAL at 11:56

## 2022-06-25 RX ADMIN — MISOPROSTOL 200 MCG: 200 TABLET ORAL at 09:18

## 2022-06-25 RX ADMIN — CLONAZEPAM 1 MG: 1 TABLET ORAL at 21:42

## 2022-06-25 RX ADMIN — LEVOTHYROXINE SODIUM 112 MCG: 112 TABLET ORAL at 05:41

## 2022-06-25 NOTE — ASSESSMENT & PLAN NOTE
· 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 at [facility]  · Outpatient bariatric follow-up

## 2022-06-25 NOTE — PLAN OF CARE
Problem: Prexisting or High Potential for Compromised Skin Integrity  Goal: Skin integrity is maintained or improved  Description: INTERVENTIONS:  - Identify patients at risk for skin breakdown  - Assess and monitor skin integrity  - Assess and monitor nutrition and hydration status  - Monitor labs   - Assess for incontinence   - Turn and reposition patient  - Assist with mobility/ambulation  - Relieve pressure over bony prominences  - Avoid friction and shearing  - Provide appropriate hygiene as needed including keeping skin clean and dry  - Evaluate need for skin moisturizer/barrier cream  - Collaborate with interdisciplinary team   - Patient/family teaching  - Consider wound care consult   Outcome: Progressing     Problem: MOBILITY - ADULT  Goal: Maintain or return to baseline ADL function  Description: INTERVENTIONS:  -  Assess patient's ability to carry out ADLs; assess patient's baseline for ADL function and identify physical deficits which impact ability to perform ADLs (bathing, care of mouth/teeth, toileting, grooming, dressing, etc )  - Assess/evaluate cause of self-care deficits   - Assess range of motion  - Assess patient's mobility; develop plan if impaired  - Assess patient's need for assistive devices and provide as appropriate  - Encourage maximum independence but intervene and supervise when necessary  - Involve family in performance of ADLs  - Assess for home care needs following discharge   - Consider OT consult to assist with ADL evaluation and planning for discharge  - Provide patient education as appropriate  Outcome: Progressing  Goal: Maintains/Returns to pre admission functional level  Description: INTERVENTIONS:  - Perform BMAT or MOVE assessment daily    - Set and communicate daily mobility goal to care team and patient/family/caregiver  - Collaborate with rehabilitation services on mobility goals if consulted  - Perform Range of Motion 3 times a day    - Reposition patient every 2 hours   - Dangle patient 3 times a day  - Stand patient 3 times a day  - Out of bed for toileting  - Record patient progress and toleration of activity level   Outcome: Progressing     Problem: Potential for Falls  Goal: Patient will remain free of falls  Description: INTERVENTIONS:  - Educate patient/family on patient safety including physical limitations  - Instruct patient to call for assistance with activity   - Consult OT/PT to assist with strengthening/mobility   - Keep Call bell within reach  - Keep bed low and locked with side rails adjusted as appropriate  - Keep care items and personal belongings within reach  - Initiate and maintain comfort rounds  - Make Fall Risk Sign visible to staff  - Offer Toileting every 2 Hours, in advance of need  - Initiate/Maintain bed alarm  - Obtain necessary fall risk management equipment  - Apply yellow socks and bracelet for high fall risk patients  - Consider moving patient to room near nurses station  Outcome: Progressing     Problem: PAIN - ADULT  Goal: Verbalizes/displays adequate comfort level or baseline comfort level  Description: Interventions:  - Encourage patient to monitor pain and request assistance  - Assess pain using appropriate pain scale  - Administer analgesics based on type and severity of pain and evaluate response  - Implement non-pharmacological measures as appropriate and evaluate response  - Consider cultural and social influences on pain and pain management  - Notify physician/advanced practitioner if interventions unsuccessful or patient reports new pain  Outcome: Progressing     Problem: INFECTION - ADULT  Goal: Absence or prevention of progression during hospitalization  Description: INTERVENTIONS:  - Assess and monitor for signs and symptoms of infection  - Monitor lab/diagnostic results  - Monitor all insertion sites, i e  indwelling lines, tubes, and drains  - Monitor endotracheal if appropriate and nasal secretions for changes in amount and color  - [place] appropriate cooling/warming therapies per order  - Administer medications as ordered  - Instruct and encourage patient and family to use good hand hygiene technique  - Identify and instruct in appropriate isolation precautions for identified infection/condition  Outcome: Progressing  Goal: Absence of fever/infection during neutropenic period  Description: INTERVENTIONS:  - Monitor WBC    Outcome: Progressing     Problem: SAFETY ADULT  Goal: Maintain or return to baseline ADL function  Description: INTERVENTIONS:  -  Assess patient's ability to carry out ADLs; assess patient's baseline for ADL function and identify physical deficits which impact ability to perform ADLs (bathing, care of mouth/teeth, toileting, grooming, dressing, etc )  - Assess/evaluate cause of self-care deficits   - Assess range of motion  - Assess patient's mobility; develop plan if impaired  - Assess patient's need for assistive devices and provide as appropriate  - Encourage maximum independence but intervene and supervise when necessary  - Involve family in performance of ADLs  - Assess for home care needs following discharge   - Consider OT consult to assist with ADL evaluation and planning for discharge  - Provide patient education as appropriate  Outcome: Progressing  Goal: Maintains/Returns to pre admission functional level  Description: INTERVENTIONS:  - Perform BMAT or MOVE assessment daily    - Set and communicate daily mobility goal to care team and patient/family/caregiver  - Collaborate with rehabilitation services on mobility goals if consulted  - Perform Range of Motion 3 times a day  - Reposition patient every 2 hours    - Dangle patient 3 times a day  - Stand patient 3 times a day  - Out of bed for toileting  - Record patient progress and toleration of activity level   Outcome: Progressing  Goal: Patient will remain free of falls  Description: INTERVENTIONS:  - Educate patient/family on patient safety including physical limitations  - Instruct patient to call for assistance with activity   - Consult OT/PT to assist with strengthening/mobility   - Keep Call bell within reach  - Keep bed low and locked with side rails adjusted as appropriate  - Keep care items and personal belongings within reach  - Initiate and maintain comfort rounds  - Make Fall Risk Sign visible to staff  - Offer Toileting every 2 Hours, in advance of need  - Initiate/Maintain bed alarm  - Obtain necessary fall risk management equipment  - Apply yellow socks and bracelet for high fall risk patients  - Consider moving patient to room near nurses station  Outcome: Progressing     Problem: DISCHARGE PLANNING  Goal: Discharge to home or other facility with appropriate resources  Description: INTERVENTIONS:  - Identify barriers to discharge w/patient and caregiver  - Arrange for needed discharge resources and transportation as appropriate  - Identify discharge learning needs (meds, wound care, etc )  - Arrange for interpretive services to assist at discharge as needed  - Refer to Case Management Department for coordinating discharge planning if the patient needs post-hospital services based on physician/advanced practitioner order or complex needs related to functional status, cognitive ability, or social support system  Outcome: Progressing     Problem: Knowledge Deficit  Goal: Patient/family/caregiver demonstrates understanding of disease process, treatment plan, medications, and discharge instructions  Description: Complete learning assessment and assess knowledge base    Interventions:  - Provide teaching at level of understanding  - Provide teaching via preferred learning methods  Outcome: Progressing     Problem: GASTROINTESTINAL - ADULT  Goal: Minimal or absence of nausea and/or vomiting  Description: INTERVENTIONS:  - Administer IV fluids if ordered to ensure adequate hydration  - Maintain NPO status until nausea and vomiting are resolved  - Nasogastric tube if ordered  - Administer ordered antiemetic medications as needed  - Provide nonpharmacologic comfort measures as appropriate  - Advance diet as tolerated, if ordered  - Consider nutrition services referral to assist patient with adequate nutrition and appropriate food choices  Outcome: Progressing  Goal: Maintains or returns to baseline bowel function  Description: INTERVENTIONS:  - Assess bowel function  - Encourage oral fluids to ensure adequate hydration  - Administer IV fluids if ordered to ensure adequate hydration  - Administer ordered medications as needed  - Encourage mobilization and activity  - Consider nutritional services referral to assist patient with adequate nutrition and appropriate food choices  Outcome: Progressing  Goal: Maintains adequate nutritional intake  Description: INTERVENTIONS:  - Monitor percentage of each meal consumed  - Identify factors contributing to decreased intake, treat as appropriate  - Assist with meals as needed  - Monitor I&O, weight, and lab values if indicated  - Obtain nutrition services referral as needed  Outcome: Progressing  Goal: Establish and maintain optimal ostomy function  Description: INTERVENTIONS:  - Assess bowel function  - Encourage oral fluids to ensure adequate hydration  - Administer IV fluids if ordered to ensure adequate hydration   - Administer ordered medications as needed  - Encourage mobilization and activity  - Nutrition services referral to assist patient with appropriate food choices  - Assess stoma site  - Consider wound care consult   Outcome: Progressing  Goal: Oral mucous membranes remain intact  Description: INTERVENTIONS  - Assess oral mucosa and hygiene practices  - Implement preventative oral hygiene regimen  - Implement oral medicated treatments as ordered  - Initiate Nutrition services referral as needed  Outcome: Progressing     Problem: Nutrition/Hydration-ADULT  Goal: Nutrient/Hydration intake appropriate for improving, restoring or maintaining nutritional needs  Description: Monitor and assess patient's nutrition/hydration status for malnutrition  Collaborate with interdisciplinary team and initiate plan and interventions as ordered  Monitor patient's weight and dietary intake as ordered or per policy  Utilize nutrition screening tool and intervene as necessary  Determine patient's food preferences and provide high-protein, high-caloric foods as appropriate       INTERVENTIONS:  - Monitor oral intake, urinary output, labs, and treatment plans  - Assess nutrition and hydration status and recommend course of action  - Evaluate amount of meals eaten  - Assist patient with eating if necessary   - Allow adequate time for meals  - Recommend/ encourage appropriate diets, oral nutritional supplements, and vitamin/mineral supplements  - Order, calculate, and assess calorie counts as needed  - Recommend, monitor, and adjust tube feedings and TPN/PPN based on assessed needs  - Assess need for intravenous fluids  - Provide specific nutrition/hydration education as appropriate  - Include patient/family/caregiver in decisions related to nutrition  Outcome: Progressing     Problem: HEMATOLOGIC - ADULT  Goal: Maintains hematologic stability  Description: INTERVENTIONS  - Assess for signs and symptoms of bleeding or hemorrhage  - Monitor labs  - Administer supportive blood products/factors as ordered and appropriate  Outcome: Progressing

## 2022-06-25 NOTE — ASSESSMENT & PLAN NOTE
This is a 70-year-old female with history of Savage-en-Y gastric bypass, symptomatic anemia, gastric ulcer, hypothyroidism, depression initially admitted on 06/11/2022 to [facility] for hematemesis and blood per rectum Secondary to marginal/anastomotic ulcer status post EGD x2  · Status post total 6 unit PRBCs  · Hemoglobin slowly trending down    Consented for 1 unit packed red blood cell 6/25  · GI to re-evaluate  · Transfuse further as needed to maintain greater than 7  · Appreciate GI, bariatric recommendations

## 2022-06-25 NOTE — PROGRESS NOTES
Consultation - Bariatric Surgery   Grey Figureoa 64 y o  female MRN: [id number]  Unit/Bed#: E5 -01 Encounter: [id number]    Assessment/Plan     Assessment:  65 yo female with hx of RNYGB at [facility] in 2012 with hx of known marginal ulcers who presented to [address] [place] [address] with a bleeding marginal ulcer now s/p EGDx2 with GI     Plan:  - Continue PPI BID  Continue to trend H/H q24h  - Zofran PRN for nausea/vomiting  - Transfuse for Hgb < 7  I have ordered a repeat CBC to confirm the lab value of 6 6   - Continue full liquids at this time  If hgb tip, will consider advancing to a soft diet  - If patient rebleeds recommend contact IR or GI for possible interventions before taking the patient to the OR  - Continue cytotec and carafate  - Continue TPN for nutritional optimization and plan for potential outpatient revision  Will change to custom base per nutrition recs and adjust electrolytes as sodium is still low  Patient will require repeat endoscopy 6 weeks after discharge      History of Present Illness     Subjective:  Patient denies overnight events  She states she had multiple bowel movements yesterday  Per patient and per nursing report, there was no evidence of blood within the stool  No vomiting  Tolerating fulls, but does not like to eat  Hospital Course:  Grey Figueroa is a 64 y o  female Body mass index is 20 66 kg/m²  with history of RNYGB at [facility] in 2012 who presented to [address] [place] [address] ER on 6/11/22 with UGIB  She has undergone two EGDs with GI on 6/12 and 6/15 that showed a large, deep, irregular ulcer at the [address] [facility] anastomosis with nonbleeding visible vessel  Induced coagulation and hemostasis achieved with 2 applications of bipolar cautery, epinephrine injection  Since admission she has received 6 U pRBCs    She was transferred to [facility]  on 6/16/22 for bariatric surgery evaluation  6/18 - no acute events overnight   Her H/H has remained stable for the past 24 hrs (7 -> 7 8 -> 7 3 -> 7 2) without requiring any transfusions  She has remained hemodynamically stable  She denies nausea and vomiting as well as abdominal pain  6/19 - transferred out of the ICU yesterday  H/H continues to remain stable without further transfusions  She denies nausea and vomiting  No bloody bowel movements per nursing    6/20- PICC placed    Consults    Review of Systems   Constitutional: Negative for chills and fever  HENT: Negative for ear pain and sore throat  Eyes: Negative for pain and visual disturbance  Respiratory: Negative for cough and shortness of breath  Cardiovascular: Negative for chest pain and palpitations  Gastrointestinal: Negative for abdominal pain and vomiting  Genitourinary: Negative for dysuria and hematuria  Musculoskeletal: Negative for arthralgias and back pain  Skin: Negative for color change and rash  Neurological: Negative for dizziness, seizures and syncope  All other systems reviewed and are negative        Historical Information   Past Medical History:   Diagnosis Date    Anemia     Anxiety     Bipolar disorder ([address] )     Colon polyp     Disease of thyroid gland     Essential hypertension     Essential tremor     Fibromyalgia, primary     GERD (gastroesophageal reflux disease)     Inflammatory polyarthropathy (HCC)     Mammogram abnormal      Past Surgical History:   Procedure Laterality Date    BREAST BIOPSY Right 2015    benign    CARPAL TUNNEL RELEASE Bilateral     COLONOSCOPY      EGD AND COLONOSCOPY  01/01/2014    GASTRIC BYPASS  07/01/2012    MAMMO NEEDLE LOCALIZATION RIGHT (ALL INC) Right 2/6/2012    MAMMO NEEDLE LOCALIZATION RIGHT (ALL INC) Right 2/6/2012    ULNAR NERVE TRANSPOSITION Right      Social History   Social History     Substance and Sexual Activity   Alcohol Use Yes    Alcohol/week: 4 0 standard drinks    Types: 4 Glasses of wine per week    Comment: rare     Social History Substance and Sexual Activity   Drug Use Never     Social History     Tobacco Use   Smoking Status Former Smoker    Quit date: 12    Years since quittin 5   Smokeless Tobacco Never Used     Family History: Non-contributory      Meds/Allergies   No Known Allergies    Objective   First Vitals:   Blood Pressure: 105/65 (22)  Pulse: 84 (22)  Temperature: (!) 97 2 °F (36 2 °C) (22)  Temp Source: Temporal (22)  Respirations: 20 (22)  Height: 5' 4" (162 6 cm) (22)  Weight - Scale: 57 4 kg (126 lb 8 7 oz) (22)  SpO2: 99 % (22)    Current Vitals:   Blood Pressure: 115/52 (22)  Pulse: 83 (22)  Temperature: 98 2 °F (36 8 °C) (22)  Temp Source: Oral (22)  Respirations: 18 (22)  Height: 5' 4" (162 6 cm) (22)  Weight - Scale: 54 6 kg (120 lb 5 9 oz) (22)  SpO2: 98 % (2235)      Intake/Output Summary (Last 24 hours) at 2022 0959  Last data filed at 20[address]  Gross per 24 hour   Intake 825 ml   Output 300 ml   Net 525 ml       Invasive Devices  Report    Peripherally Inserted Central Catheter Line  Duration           PICC Line 34/81/37 Right Basilic 4 days                Physical Exam  Constitutional:       Appearance: Normal appearance  She is normal weight  Comments: Frail appearing    HENT:      Head: Normocephalic and atraumatic  Cardiovascular:      Rate and Rhythm: Normal rate  Pulmonary:      Effort: Pulmonary effort is normal    Abdominal:      General: Abdomen is flat  There is no distension  Palpations: Abdomen is soft  There is no mass  Tenderness: There is no guarding or rebound  Musculoskeletal:         General: Normal range of motion  Skin:     General: Skin is warm and dry  Neurological:      Mental Status: She is alert     Psychiatric:         Mood and Affect: Mood normal          Behavior: Behavior normal

## 2022-06-25 NOTE — PROGRESS NOTES
[facility]  Progress Note - Osei Blancas 1945, 64 y o  female MRN: [id number]  Unit/Bed#: E5 -01 Encounter: [id number]  Primary Care Provider: Margarita Turner MD   Date and time admitted to hospital: 6/16/2022  9:16 PM    Gastric ulcer  Assessment & Plan  · EGD 6/12 revealed single large deep irregular benign appearing ulcer gastrojejunal anastomoses with adherent clot status post 2 clips, injected epinephrine and hemostasis achieved  · Emergent EGD 06/15/2022 revealed single large deep ulcer in gastrojejunal anastomosis with nonbleeding visible vessel, induced coagulation and hemostasis achieved with cautery and epinephrine injection  · Bariatric surgery follow-up appreciated  · Full bariatric diet  · Continue Protonix b i d , Carafate, Cytotec  · PICC line placed, initiated on TPN  Management per bariatrics   · Plan for nutritional optimization and potential outpatient revision  Plan for repeat endoscopy 6 weeks after discharge      * Acute blood loss anemia  Assessment & Plan  This is a 15-year-old female with history of Savage-en-Y gastric bypass, symptomatic anemia, gastric ulcer, hypothyroidism, depression initially admitted on 06/11/2022 to [facility] for hematemesis and blood per rectum Secondary to marginal/anastomotic ulcer status post EGD x2  · Status post total 6 unit PRBCs  · Hemoglobin slowly trending down  Consented for 1 unit packed red blood cell 6/25  · GI to re-evaluate  · Transfuse further as needed to maintain greater than 7  · Appreciate GI, bariatric recommendations    Bilateral leg edema  Assessment & Plan  · Lower extremity Doppler negative for DVT  · Likely due to low albumin and 3rd spacing  · Elevate when resting  · Improving  · Compression stockings on discharge     Severe protein-calorie malnutrition (HCC)  Assessment & Plan  Malnutrition Findings:   Adult Malnutrition type: Chronic illness  BMI Findings:       Body mass index is 20 66 kg/m²  TPN initiated  Full bariatric diet    Ambulatory dysfunction  Assessment & Plan  Appreciate PT/OT recommendations  Awaiting transfer to rehab    H/O bariatric surgery - bypass  Assessment & Plan  · 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 at [facility]  · Outpatient bariatric follow-up    Hyponatremia  Assessment & Plan  Likely related to poor oral intake  TPN initiated today  On previous admission urine osmolality 190, urine sodium 25  Mild, stable    Depression  Assessment & Plan  · Continue Klonopin  · Seen by neuropsychiatry  deemed patient not to have capacity to make decisions  · Brother Dhaval Feldman is POA    Hypothyroidism  Assessment & Plan  · Continue Synthroid    VTE Pharmacologic Prophylaxis:  On hold due to anemia    Patient Centered Rounds:  Patient care rounds were performed with nursing    Discussions with Specialists or Other Care Team Provider:  GI    Education and Discussions with Family / Patient:  Updated sister    Time Spent for Care: 30  More than 50% of total time spent on counseling and coordination of care as described above  Current Length of Stay: 9 day(s)    Current Patient Status: Inpatient   Certification Statement: The patient will continue to require additional inpatient hospital stay due to acute blood loss anemia    Discharge Plan:  Pending medical stability placement to rehab    Code Status: Level 1 - Full Code      Subjective:   Patient seen and evaluated at bedside  No overnight events  Objective:     Vitals:   Temp (24hrs), Av °F (37 2 °C), Min:98 2 °F (36 8 °C), Max:99 8 °F (37 7 °C)    Temp:  [98 2 °F (36 8 °C)-99 8 °F (37 7 °C)] 98 2 °F (36 8 °C)  HR:  [77-88] 83  Resp:  [18-20] 18  BP: ()/(48-53) 115/52  SpO2:  [98 %-99 %] 98 %  Body mass index is 20 66 kg/m²  Input and Output Summary (last 24 hours):        Intake/Output Summary (Last 24 hours) at 2022 1450  Last data filed at 2022 2139  Gross per 24 hour Intake 825 ml   Output 300 ml   Net 525 ml       Physical Exam:     Physical Exam  Vitals reviewed  Constitutional:       General: She is not in acute distress  Appearance: She is well-developed  She is not ill-appearing, toxic-appearing or diaphoretic  Comments: Thin appearing   HENT:      Head: Normocephalic and atraumatic  Eyes:      General: No scleral icterus  Conjunctiva/sclera: Conjunctivae normal    Cardiovascular:      Rate and Rhythm: Normal rate and regular rhythm  Heart sounds: Normal heart sounds  Pulmonary:      Effort: Pulmonary effort is normal  No respiratory distress  Breath sounds: Normal breath sounds  No wheezing or rales  Abdominal:      General: There is no distension  Palpations: Abdomen is soft  Tenderness: There is no abdominal tenderness  There is no guarding or rebound  Musculoskeletal:         General: No swelling, tenderness or deformity  Skin:     General: Skin is warm and dry  Neurological:      General: No focal deficit present  Mental Status: She is alert  Mental status is at baseline  Psychiatric:         Mood and Affect: Mood normal          Behavior: Behavior normal          Thought Content: Thought content normal          Judgment: Judgment normal          Additional Data:     Labs: I have reviewed pertinent results     Results from last 7 days   Lab Units 06/25/22  1219 06/20/22  1857 06/20/22  0747   WBC Thousand/uL 7 08   < > 9 84   HEMOGLOBIN g/dL 6 8*   < > 8 0*   HEMATOCRIT % 21 5*   < > 25 0*   PLATELETS Thousands/uL 322   < > 489*   NEUTROS PCT %  --   --  59   LYMPHS PCT %  --   --  19   MONOS PCT %  --   --  11   EOS PCT %  --   --  10*    < > = values in this interval not displayed       Results from last 7 days   Lab Units 06/25/22  0542 06/21/22  0525 06/20/22  1318   SODIUM mmol/L 134*   < >  --    POTASSIUM mmol/L 4 4   < >  --    CHLORIDE mmol/L 105   < >  --    CO2 mmol/L 24   < >  --    BUN mg/dL 16   < > --    CREATININE mg/dL 0 95   < >  --    ANION GAP mmol/L 5   < >  --    CALCIUM mg/dL 7 1*   < >  --    ALBUMIN g/dL  --   --  1 8*   GLUCOSE RANDOM mg/dL 81   < >  --     < > = values in this interval not displayed  Imaging: I have reviewed pertinent imaging       Recent Cultures (last 7 days):           Last 24 Hours Medication List:   Current Facility-Administered Medications   Medication Dose Route Frequency Provider Last Rate    acetaminophen  650 mg Oral Q6H PRN Samuel Fonseca MD      Adult TPN (CUSTOM BASE/CUSTOM ELECTROLYTE)   Intravenous Continuous TPN Jovita Hylton DO 63 2 mL/hr at 06/24/22 2205    Adult TPN (CUSTOM BASE/CUSTOM ELECTROLYTE)   Intravenous Continuous TPN Jovita Hylton DO      clonazePAM  1 mg Oral HS Samuel Fonseca MD      folic acid  1 mg Oral Daily Inessa Hdez DO      levothyroxine  112 mcg Oral Early Morning Samuel Fonseca MD      misoprostol  200 mcg Oral 4x Daily (with meals and at bedtime) Samuel Fonseca MD      ondansetron  4 mg Intravenous Q4H PRN Samuel Fonseca MD      pantoprazole  40 mg Oral BID AC Inessa Hdez DO      potassium chloride  40 mEq Oral BID Samuel Fonseca MD      sucralfate  1 g Oral 4x Daily (AC & HS) Samuel Fonseca MD      thiamine  100 mg Oral Daily Inessa Hdez DO          Today, Patient Was Seen By: Inessa Hdez DO    ** Please Note: Dictation voice to text software may have been used in the creation of this document   **

## 2022-06-25 NOTE — ASSESSMENT & PLAN NOTE
· Continue Klonopin  · Seen by neuropsychiatry 6/13 deemed patient not to have capacity to make decisions  · Grant Machado is POA

## 2022-06-25 NOTE — PLAN OF CARE
Problem: Prexisting or High Potential for Compromised Skin Integrity  Goal: Skin integrity is maintained or improved  Description: INTERVENTIONS:  - Identify patients at risk for skin breakdown  - Assess and monitor skin integrity  - Assess and monitor nutrition and hydration status  - Monitor labs   - Assess for incontinence   - Turn and reposition patient  - Assist with mobility/ambulation  - Relieve pressure over bony prominences  - Avoid friction and shearing  - Provide appropriate hygiene as needed including keeping skin clean and dry  - Evaluate need for skin moisturizer/barrier cream  - Collaborate with interdisciplinary team   - Patient/family teaching  - Consider wound care consult   Outcome: Progressing     Problem: MOBILITY - ADULT  Goal: Maintain or return to baseline ADL function  Description: INTERVENTIONS:  -  Assess patient's ability to carry out ADLs; assess patient's baseline for ADL function and identify physical deficits which impact ability to perform ADLs (bathing, care of mouth/teeth, toileting, grooming, dressing, etc )  - Assess/evaluate cause of self-care deficits   - Assess range of motion  - Assess patient's mobility; develop plan if impaired  - Assess patient's need for assistive devices and provide as appropriate  - Encourage maximum independence but intervene and supervise when necessary  - Involve family in performance of ADLs  - Assess for home care needs following discharge   - Consider OT consult to assist with ADL evaluation and planning for discharge  - Provide patient education as appropriate  Outcome: Progressing  Goal: Maintains/Returns to pre admission functional level  Description: INTERVENTIONS:  - Perform BMAT or MOVE assessment daily    - Set and communicate daily mobility goal to care team and patient/family/caregiver  - Collaborate with rehabilitation services on mobility goals if consulted  - Perform Range of Motion 3 times a day    - Reposition patient every 2 hours   - Dangle patient 3 times a day  - Stand patient 3 times a day  - Out of bed for toileting  - Record patient progress and toleration of activity level   Outcome: Progressing     Problem: Potential for Falls  Goal: Patient will remain free of falls  Description: INTERVENTIONS:  - Educate patient/family on patient safety including physical limitations  - Instruct patient to call for assistance with activity   - Consult OT/PT to assist with strengthening/mobility   - Keep Call bell within reach  - Keep bed low and locked with side rails adjusted as appropriate  - Keep care items and personal belongings within reach  - Initiate and maintain comfort rounds  - Make Fall Risk Sign visible to staff  - Offer Toileting every 2 Hours, in advance of need  - Initiate/Maintain bed alarm  - Obtain necessary fall risk management equipment  - Apply yellow socks and bracelet for high fall risk patients  - Consider moving patient to room near nurses station  Outcome: Progressing     Problem: PAIN - ADULT  Goal: Verbalizes/displays adequate comfort level or baseline comfort level  Description: Interventions:  - Encourage patient to monitor pain and request assistance  - Assess pain using appropriate pain scale  - Administer analgesics based on type and severity of pain and evaluate response  - Implement non-pharmacological measures as appropriate and evaluate response  - Consider cultural and social influences on pain and pain management  - Notify physician/advanced practitioner if interventions unsuccessful or patient reports new pain  Outcome: Progressing     Problem: INFECTION - ADULT  Goal: Absence or prevention of progression during hospitalization  Description: INTERVENTIONS:  - Assess and monitor for signs and symptoms of infection  - Monitor lab/diagnostic results  - Monitor all insertion sites, i e  indwelling lines, tubes, and drains  - Monitor endotracheal if appropriate and nasal secretions for changes in amount and color  - Rochelle appropriate cooling/warming therapies per order  - Administer medications as ordered  - Instruct and encourage patient and family to use good hand hygiene technique  - Identify and instruct in appropriate isolation precautions for identified infection/condition  Outcome: Progressing  Goal: Absence of fever/infection during neutropenic period  Description: INTERVENTIONS:  - Monitor WBC    Outcome: Progressing     Problem: SAFETY ADULT  Goal: Maintain or return to baseline ADL function  Description: INTERVENTIONS:  -  Assess patient's ability to carry out ADLs; assess patient's baseline for ADL function and identify physical deficits which impact ability to perform ADLs (bathing, care of mouth/teeth, toileting, grooming, dressing, etc )  - Assess/evaluate cause of self-care deficits   - Assess range of motion  - Assess patient's mobility; develop plan if impaired  - Assess patient's need for assistive devices and provide as appropriate  - Encourage maximum independence but intervene and supervise when necessary  - Involve family in performance of ADLs  - Assess for home care needs following discharge   - Consider OT consult to assist with ADL evaluation and planning for discharge  - Provide patient education as appropriate  Outcome: Progressing  Goal: Maintains/Returns to pre admission functional level  Description: INTERVENTIONS:  - Perform BMAT or MOVE assessment daily    - Set and communicate daily mobility goal to care team and patient/family/caregiver  - Collaborate with rehabilitation services on mobility goals if consulted  - Perform Range of Motion 3 times a day  - Reposition patient every 2 hours    - Dangle patient 3 times a day  - Stand patient 3 times a day  - Out of bed for toileting  - Record patient progress and toleration of activity level   Outcome: Progressing  Goal: Patient will remain free of falls  Description: INTERVENTIONS:  - Educate patient/family on patient safety including physical limitations  - Instruct patient to call for assistance with activity   - Consult OT/PT to assist with strengthening/mobility   - Keep Call bell within reach  - Keep bed low and locked with side rails adjusted as appropriate  - Keep care items and personal belongings within reach  - Initiate and maintain comfort rounds  - Make Fall Risk Sign visible to staff  - Offer Toileting every 2 Hours, in advance of need  - Initiate/Maintain bed alarm  - Obtain necessary fall risk management equipment  - Apply yellow socks and bracelet for high fall risk patients  - Consider moving patient to room near nurses station  Outcome: Progressing     Problem: DISCHARGE PLANNING  Goal: Discharge to home or other facility with appropriate resources  Description: INTERVENTIONS:  - Identify barriers to discharge w/patient and caregiver  - Arrange for needed discharge resources and transportation as appropriate  - Identify discharge learning needs (meds, wound care, etc )  - Arrange for interpretive services to assist at discharge as needed  - Refer to Case Management Department for coordinating discharge planning if the patient needs post-hospital services based on physician/advanced practitioner order or complex needs related to functional status, cognitive ability, or social support system  Outcome: Progressing     Problem: Knowledge Deficit  Goal: Patient/family/caregiver demonstrates understanding of disease process, treatment plan, medications, and discharge instructions  Description: Complete learning assessment and assess knowledge base    Interventions:  - Provide teaching at level of understanding  - Provide teaching via preferred learning methods  Outcome: Progressing     Problem: GASTROINTESTINAL - ADULT  Goal: Minimal or absence of nausea and/or vomiting  Description: INTERVENTIONS:  - Administer IV fluids if ordered to ensure adequate hydration  - Maintain NPO status until nausea and vomiting are resolved  - Nasogastric tube if ordered  - Administer ordered antiemetic medications as needed  - Provide nonpharmacologic comfort measures as appropriate  - Advance diet as tolerated, if ordered  - Consider nutrition services referral to assist patient with adequate nutrition and appropriate food choices  Outcome: Progressing  Goal: Maintains or returns to baseline bowel function  Description: INTERVENTIONS:  - Assess bowel function  - Encourage oral fluids to ensure adequate hydration  - Administer IV fluids if ordered to ensure adequate hydration  - Administer ordered medications as needed  - Encourage mobilization and activity  - Consider nutritional services referral to assist patient with adequate nutrition and appropriate food choices  Outcome: Progressing  Goal: Maintains adequate nutritional intake  Description: INTERVENTIONS:  - Monitor percentage of each meal consumed  - Identify factors contributing to decreased intake, treat as appropriate  - Assist with meals as needed  - Monitor I&O, weight, and lab values if indicated  - Obtain nutrition services referral as needed  Outcome: Progressing  Goal: Establish and maintain optimal ostomy function  Description: INTERVENTIONS:  - Assess bowel function  - Encourage oral fluids to ensure adequate hydration  - Administer IV fluids if ordered to ensure adequate hydration   - Administer ordered medications as needed  - Encourage mobilization and activity  - Nutrition services referral to assist patient with appropriate food choices  - Assess stoma site  - Consider wound care consult   Outcome: Progressing  Goal: Oral mucous membranes remain intact  Description: INTERVENTIONS  - Assess oral mucosa and hygiene practices  - Implement preventative oral hygiene regimen  - Implement oral medicated treatments as ordered  - Initiate Nutrition services referral as needed  Outcome: Progressing     Problem: HEMATOLOGIC - ADULT  Goal: Maintains hematologic stability  Description: INTERVENTIONS  - Assess for signs and symptoms of bleeding or hemorrhage  - Monitor labs  - Administer supportive blood products/factors as ordered and appropriate  Outcome: Progressing     Problem: Nutrition/Hydration-ADULT  Goal: Nutrient/Hydration intake appropriate for improving, restoring or maintaining nutritional needs  Description: Monitor and assess patient's nutrition/hydration status for malnutrition  Collaborate with interdisciplinary team and initiate plan and interventions as ordered  Monitor patient's weight and dietary intake as ordered or per policy  Utilize nutrition screening tool and intervene as necessary  Determine patient's food preferences and provide high-protein, high-caloric foods as appropriate       INTERVENTIONS:  - Monitor oral intake, urinary output, labs, and treatment plans  - Assess nutrition and hydration status and recommend course of action  - Evaluate amount of meals eaten  - Assist patient with eating if necessary   - Allow adequate time for meals  - Recommend/ encourage appropriate diets, oral nutritional supplements, and vitamin/mineral supplements  - Order, calculate, and assess calorie counts as needed  - Recommend, monitor, and adjust tube feedings and TPN/PPN based on assessed needs  - Assess need for intravenous fluids  - Provide specific nutrition/hydration education as appropriate  - Include patient/family/caregiver in decisions related to nutrition  Outcome: Progressing

## 2022-06-26 PROBLEM — E83.51 HYPOCALCEMIA: Status: RESOLVED | Noted: 2022-06-16 | Resolved: 2022-06-26

## 2022-06-26 PROBLEM — D72.9 NEUTROPHILIC LEUKOCYTOSIS: Status: RESOLVED | Noted: 2022-06-15 | Resolved: 2022-06-26

## 2022-06-26 LAB
ABO GROUP BLD BPU: NORMAL
ALBUMIN SERPL BCP-MCNC: 1.5 G/DL (ref 3.5–5)
ALP SERPL-CCNC: 43 U/L (ref 46–116)
ALT SERPL W P-5'-P-CCNC: 14 U/L (ref 12–78)
ANION GAP SERPL CALCULATED.3IONS-SCNC: 5 MMOL/L (ref 4–13)
AST SERPL W P-5'-P-CCNC: 33 U/L (ref 5–45)
BILIRUB SERPL-MCNC: 0.24 MG/DL (ref 0.2–1)
BPU ID: NORMAL
BUN SERPL-MCNC: 16 MG/DL (ref 5–25)
CALCIUM ALBUM COR SERPL-MCNC: 8.9 MG/DL (ref 8.3–10.1)
CALCIUM SERPL-MCNC: 6.9 MG/DL (ref 8.3–10.1)
CHLORIDE SERPL-SCNC: 105 MMOL/L (ref 100–108)
CO2 SERPL-SCNC: 23 MMOL/L (ref 21–32)
CREAT SERPL-MCNC: 0.84 MG/DL (ref 0.6–1.3)
CROSSMATCH: NORMAL
ERYTHROCYTE [DISTWIDTH] IN BLOOD BY AUTOMATED COUNT: 21 % (ref 11.6–15.1)
GFR SERPL CREATININE-BSD FRML MDRD: 66 ML/MIN/1.73SQ M
GLUCOSE SERPL-MCNC: 99 MG/DL (ref 65–140)
HCT VFR BLD AUTO: 23.5 % (ref 34.8–46.1)
HGB BLD-MCNC: 7.6 G/DL (ref 11.5–15.4)
MAGNESIUM SERPL-MCNC: 2.1 MG/DL (ref 1.6–2.6)
MCH RBC QN AUTO: 30 PG (ref 26.8–34.3)
MCHC RBC AUTO-ENTMCNC: 32.3 G/DL (ref 31.4–37.4)
MCV RBC AUTO: 93 FL (ref 82–98)
PHOSPHATE SERPL-MCNC: 2.8 MG/DL (ref 2.3–4.1)
PLATELET # BLD AUTO: 337 THOUSANDS/UL (ref 149–390)
PMV BLD AUTO: 10.1 FL (ref 8.9–12.7)
POTASSIUM SERPL-SCNC: 4.4 MMOL/L (ref 3.5–5.3)
PREALB SERPL-MCNC: 7.4 MG/DL (ref 18–40)
PROT SERPL-MCNC: 4.9 G/DL (ref 6.4–8.2)
RBC # BLD AUTO: 2.53 MILLION/UL (ref 3.81–5.12)
SODIUM SERPL-SCNC: 133 MMOL/L (ref 136–145)
UNIT DISPENSE STATUS: NORMAL
UNIT PRODUCT CODE: NORMAL
UNIT PRODUCT VOLUME: 350 ML
UNIT RH: NORMAL
VIT C SERPL-MCNC: 0.3 MG/DL (ref 0.4–2)
WBC # BLD AUTO: 7 THOUSAND/UL (ref 4.31–10.16)

## 2022-06-26 PROCEDURE — 83735 ASSAY OF MAGNESIUM: CPT | Performed by: SURGERY

## 2022-06-26 PROCEDURE — 99233 SBSQ HOSP IP/OBS HIGH 50: CPT | Performed by: INTERNAL MEDICINE

## 2022-06-26 PROCEDURE — 84100 ASSAY OF PHOSPHORUS: CPT | Performed by: SURGERY

## 2022-06-26 PROCEDURE — 84134 ASSAY OF PREALBUMIN: CPT | Performed by: SURGERY

## 2022-06-26 PROCEDURE — 80053 COMPREHEN METABOLIC PANEL: CPT | Performed by: SURGERY

## 2022-06-26 PROCEDURE — 85027 COMPLETE CBC AUTOMATED: CPT | Performed by: SURGERY

## 2022-06-26 RX ADMIN — SUCRALFATE 1 G: 1 TABLET ORAL at 06:00

## 2022-06-26 RX ADMIN — MISOPROSTOL 200 MCG: 200 TABLET ORAL at 08:36

## 2022-06-26 RX ADMIN — SUCRALFATE 1 G: 1 TABLET ORAL at 21:49

## 2022-06-26 RX ADMIN — Medication: at 21:51

## 2022-06-26 RX ADMIN — SUCRALFATE 1 G: 1 TABLET ORAL at 12:24

## 2022-06-26 RX ADMIN — CLONAZEPAM 1 MG: 1 TABLET ORAL at 21:49

## 2022-06-26 RX ADMIN — LEVOTHYROXINE SODIUM 112 MCG: 112 TABLET ORAL at 05:46

## 2022-06-26 RX ADMIN — PANTOPRAZOLE SODIUM 40 MG: 40 TABLET, DELAYED RELEASE ORAL at 17:16

## 2022-06-26 RX ADMIN — PANTOPRAZOLE SODIUM 40 MG: 40 TABLET, DELAYED RELEASE ORAL at 06:00

## 2022-06-26 RX ADMIN — SUCRALFATE 1 G: 1 TABLET ORAL at 17:16

## 2022-06-26 RX ADMIN — MISOPROSTOL 200 MCG: 200 TABLET ORAL at 17:16

## 2022-06-26 RX ADMIN — MISOPROSTOL 200 MCG: 200 TABLET ORAL at 21:49

## 2022-06-26 RX ADMIN — POTASSIUM CHLORIDE 40 MEQ: 20 TABLET, EXTENDED RELEASE ORAL at 17:16

## 2022-06-26 RX ADMIN — MISOPROSTOL 200 MCG: 200 TABLET ORAL at 12:24

## 2022-06-26 RX ADMIN — THIAMINE HCL TAB 100 MG 100 MG: 100 TAB at 08:36

## 2022-06-26 RX ADMIN — POTASSIUM CHLORIDE 40 MEQ: 20 TABLET, EXTENDED RELEASE ORAL at 08:36

## 2022-06-26 RX ADMIN — FOLIC ACID 1 MG: 1 TABLET ORAL at 08:36

## 2022-06-26 NOTE — ASSESSMENT & PLAN NOTE
· 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 at [facility]  · Outpatient bariatric follow-up

## 2022-06-26 NOTE — ASSESSMENT & PLAN NOTE
Malnutrition Findings:   Adult Malnutrition type: Chronic illness  BMI Findings: Body mass index is 19 83 kg/m²       TPN initiated  Full bariatric diet

## 2022-06-26 NOTE — ASSESSMENT & PLAN NOTE
This is a 75-year-old female with history of Savage-en-Y gastric bypass, symptomatic anemia, gastric ulcer, hypothyroidism, depression initially admitted on 06/11/2022 to [address] [place] [address] for hematemesis and blood per rectum Secondary to marginal/anastomotic ulcer status post EGD x2  · Status post total 6 unit PRBCs  · Hemoglobin slowly trending down    Consented for 1 unit packed red blood cell 6/25  · Hemoglobin remains stable, discussed with GI physician assistant  · Transfuse further as needed to maintain greater than 7  · Appreciate GI, bariatric recommendations

## 2022-06-26 NOTE — ASSESSMENT & PLAN NOTE
Likely related to poor oral intake  TPN initiated today, reorder by bariatric service  On previous admission urine osmolality 190, urine sodium 25  Mild, stable

## 2022-06-26 NOTE — PROGRESS NOTES
[facility]  Progress Note - Jalil Flores 1945, 64 y o  female MRN: [id number]  Unit/Bed#: E5 -01 Encounter: [id number]  Primary Care Provider: Milena Tovar MD   Date and time admitted to hospital: 6/16/2022  9:16 PM    * Acute blood loss anemia  Assessment & Plan  This is a 73-year-old female with history of Savage-en-Y gastric bypass, symptomatic anemia, gastric ulcer, hypothyroidism, depression initially admitted on 06/11/2022 to [facility] for hematemesis and blood per rectum Secondary to marginal/anastomotic ulcer status post EGD x2  · Status post total 6 unit PRBCs  · Hemoglobin slowly trending down  Consented for 1 unit packed red blood cell 6/25  · Hemoglobin remains stable, discussed with GI physician assistant  · Transfuse further as needed to maintain greater than 7  · Appreciate GI, bariatric recommendations    Bilateral leg edema  Assessment & Plan  · Lower extremity Doppler negative for DVT  · Likely due to low albumin and 3rd spacing  · Elevate when resting  · Improving  · Compression stockings on discharge     Severe protein-calorie malnutrition (HCC)  Assessment & Plan  Malnutrition Findings:   Adult Malnutrition type: Chronic illness  BMI Findings: Body mass index is 19 83 kg/m²       TPN initiated  Full bariatric diet    Ambulatory dysfunction  Assessment & Plan  Appreciate PT/OT recommendations  Awaiting transfer to rehab    Gastric ulcer  Assessment & Plan  · EGD 6/12 revealed single large deep irregular benign appearing ulcer gastrojejunal anastomoses with adherent clot status post 2 clips, injected epinephrine and hemostasis achieved  · Emergent EGD 06/15/2022 revealed single large deep ulcer in gastrojejunal anastomosis with nonbleeding visible vessel, induced coagulation and hemostasis achieved with cautery and epinephrine injection  · Bariatric surgery follow-up appreciated  · Full bariatric diet  · Continue Protonix b i d , Carafate, Cytotec  · PICC line placed, initiated on TPN  Management per bariatrics   · Plan for nutritional optimization and potential outpatient revision  Plan for repeat endoscopy 6 weeks after discharge      H/O bariatric surgery - bypass  Assessment & Plan  · 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 at [facility]  · Outpatient bariatric follow-up    Hyponatremia  Assessment & Plan  Likely related to poor oral intake  TPN initiated today, reorder by bariatric service  On previous admission urine osmolality 190, urine sodium 25  Mild, stable    Depression  Assessment & Plan  · Continue Klonopin  · Seen by neuropsychiatry 6/13 deemed patient not to have capacity to make decisions  · Brother Vivien Smith is POA    Hypothyroidism  Assessment & Plan  · Continue [facility] Progress Note  Patient: Len Frank 64 y o  female   MRN: [id number]  PCP: Alondra Mims MD  Unit/Bed#: E5 -01 Encounter: [id number]  Date Of Visit: 06/26/22    Assessment:    Principal Problem:    Acute blood loss anemia  Active Problems:    Hypothyroidism    Depression    Hyponatremia    H/O bariatric surgery - bypass    Gastric ulcer    Ambulatory dysfunction    Severe protein-calorie malnutrition (HCC)    Bilateral leg edema      Plan:    · Renew TPN  · Await discharge plan       VTE Pharmacologic Prophylaxis:   Pharmacologic: Pharmacologic VTE Prophylaxis contraindicated due to Anemia  Mechanical VTE Prophylaxis in Place: Yes    Patient Centered Rounds: I have performed bedside rounds with nursing staff today  Discussions with Specialists or Other Care Team Provider:      Education and Discussions with Family / Patient:  Patient updating family    Time Spent for Care: 45 minutes  More than 50% of total time spent on counseling and coordination of care as described above  Current Length of Stay: 10 day(s)    Current Patient Status: Inpatient   Certification Statement:  The patient will continue to require additional inpatient hospital stay due to Rehab placement    Discharge Plan / Estimated Discharge Date:  Tomorrow    Code Status: Level 1 - Full Code      Subjective:   Seen and examined, no acute complaints  Does not like the food here, awaiting placement    A complete and comprehensive 14 point organ system review has been performed and all other systems are negative other than stated above  Objective:     Vitals:   Temp (24hrs), Av 3 °F (36 8 °C), Min:97 7 °F (36 5 °C), Max:98 9 °F (37 2 °C)    Temp:  [97 7 °F (36 5 °C)-98 9 °F (37 2 °C)] 98 6 °F (37 °C)  HR:  [74-85] 74  Resp:  [18-20] 20  BP: ()/(43-51) 115/50  SpO2:  [94 %-98 %] 96 %  Body mass index is 19 83 kg/m²  Input and Output Summary (last 24 hours):        Intake/Output Summary (Last 24 hours) at 2022 1728  Last data filed at 2022 1700  Gross per 24 hour   Intake 1445 83 ml   Output 687 ml   Net 758 83 ml       Physical Exam:     General: well appearing, no acute distress, thin frail  HEENT: atraumatic, PERRLA, moist mucosa, normal pharynx, normal tonsils and adenoids, normal tongue, no fluid in sinuses  Neck: Trachea midline, no carotid bruit, no masses  Respiratory: normal chest wall expansion, CTA B, no r/r/w, no rubs  Cardiovascular: RRR, no m/r/g, Normal S1 and S2  Abdomen: Soft, non-tender, non-distended, normal bowel sounds in all quadrants, no hepatosplenomegaly, no tympany  Rectal: deferred  Musculoskeletal: normal ROM in upper and lower extremities  Integumentary: warm, dry, and pink, with no rash, purpura, or petechia  Heme/Lymph: no lymphadenopathy, no bruises  Neurological: Cranial Nerves II-XII grossly intact  Psychiatric: cooperative with normal mood, affect, and cognition      Additional Data:     Labs:    Results from last 7 days   Lab Units 22  0515 22  1857 22  0747   WBC Thousand/uL 7 00   < > 9 84   HEMOGLOBIN g/dL 7 6*   < > 8 0*   HEMATOCRIT % 23 5*   < > 25 0*   PLATELETS Thousands/uL 337   < > 489*   NEUTROS PCT %  --   --  59   LYMPHS PCT %  --   --  19   MONOS PCT %  --   --  11   EOS PCT %  --   --  10*    < > = values in this interval not displayed  Results from last 7 days   Lab Units 06/26/22  0515   POTASSIUM mmol/L 4 4   CHLORIDE mmol/L 105   CO2 mmol/L 23   BUN mg/dL 16   CREATININE mg/dL 0 84   CALCIUM mg/dL 6 9*   ALK PHOS U/L 43*   ALT U/L 14   AST U/L 33           * I Have Reviewed All Lab Data Listed Above  * Additional Pertinent Lab Tests Reviewed: [place] [facility] Admission Reviewed    Imaging:    Imaging Reports Reviewed Today Include:  no new imaging  Imaging Personally Reviewed by Myself Includes:  No new imaging    Recent Cultures (last 7 days):           Last 24 Hours Medication List:   Current Facility-Administered Medications   Medication Dose Route Frequency Provider Last Rate    acetaminophen  650 mg Oral Q6H PRN Carol Chang MD      Adult TPN (CUSTOM BASE/CUSTOM ELECTROLYTE)   Intravenous Continuous TPN Jovita Hylton DO 63 2 mL/hr at 06/25/22 2051    Adult TPN (CUSTOM BASE/CUSTOM ELECTROLYTE)   Intravenous Continuous TPN Jovitakash Hylton DO      clonazePAM  1 mg Oral HS Carol Chang MD      folic acid  1 mg Oral Daily Catalina Viramontes DO      levothyroxine  112 mcg Oral Early Morning Carol Chang MD      misoprostol  200 mcg Oral 4x Daily (with meals and at bedtime) Carol Chang MD      ondansetron  4 mg Intravenous Q4H PRN Carol Chang MD      pantoprazole  40 mg Oral BID AC Catalina Viramontes DO      potassium chloride  40 mEq Oral BID Carol Chang MD      sucralfate  1 g Oral 4x Daily (AC & HS) Carol Chang MD      thiamine  100 mg Oral Daily Catalina Viramontes DO          Today, Patient Was Seen By: Jessi Krueger DO    ** Please Note: This note was completed in part utilizing [employer] Fluency Direct Software    Grammatical errors, random word insertions, spelling mistakes, and incomplete sentences may be an occasional consequence of this system secondary to software limitations, ambient noise, and hardware issues  If you have any questions or concerns about the content, text, or information contained within the body of this dictation, please contact the provider for clarification   ** Normal

## 2022-06-26 NOTE — PLAN OF CARE
Problem: Prexisting or High Potential for Compromised Skin Integrity  Goal: Skin integrity is maintained or improved  Description: INTERVENTIONS:  - Identify patients at risk for skin breakdown  - Assess and monitor skin integrity  - Assess and monitor nutrition and hydration status  - Monitor labs   - Assess for incontinence   - Turn and reposition patient  - Assist with mobility/ambulation  - Relieve pressure over bony prominences  - Avoid friction and shearing  - Provide appropriate hygiene as needed including keeping skin clean and dry  - Evaluate need for skin moisturizer/barrier cream  - Collaborate with interdisciplinary team   - Patient/family teaching  - Consider wound care consult   Outcome: Progressing     Problem: MOBILITY - ADULT  Goal: Maintain or return to baseline ADL function  Description: INTERVENTIONS:  -  Assess patient's ability to carry out ADLs; assess patient's baseline for ADL function and identify physical deficits which impact ability to perform ADLs (bathing, care of mouth/teeth, toileting, grooming, dressing, etc )  - Assess/evaluate cause of self-care deficits   - Assess range of motion  - Assess patient's mobility; develop plan if impaired  - Assess patient's need for assistive devices and provide as appropriate  - Encourage maximum independence but intervene and supervise when necessary  - Involve family in performance of ADLs  - Assess for home care needs following discharge   - Consider OT consult to assist with ADL evaluation and planning for discharge  - Provide patient education as appropriate  Outcome: Progressing  Goal: Maintains/Returns to pre admission functional level  Description: INTERVENTIONS:  - Perform BMAT or MOVE assessment daily    - Set and communicate daily mobility goal to care team and patient/family/caregiver  - Collaborate with rehabilitation services on mobility goals if consulted  - Perform Range of Motion 3 times a day    - Reposition patient every 2 hours   - Dangle patient 3 times a day  - Stand patient 3 times a day  - Out of bed for toileting  - Record patient progress and toleration of activity level   Outcome: Progressing     Problem: Potential for Falls  Goal: Patient will remain free of falls  Description: INTERVENTIONS:  - Educate patient/family on patient safety including physical limitations  - Instruct patient to call for assistance with activity   - Consult OT/PT to assist with strengthening/mobility   - Keep Call bell within reach  - Keep bed low and locked with side rails adjusted as appropriate  - Keep care items and personal belongings within reach  - Initiate and maintain comfort rounds  - Make Fall Risk Sign visible to staff  - Offer Toileting every 2 Hours, in advance of need  - Initiate/Maintain bed alarm  - Obtain necessary fall risk management equipment  - Apply yellow socks and bracelet for high fall risk patients  - Consider moving patient to room near nurses station  Outcome: Progressing     Problem: PAIN - ADULT  Goal: Verbalizes/displays adequate comfort level or baseline comfort level  Description: Interventions:  - Encourage patient to monitor pain and request assistance  - Assess pain using appropriate pain scale  - Administer analgesics based on type and severity of pain and evaluate response  - Implement non-pharmacological measures as appropriate and evaluate response  - Consider cultural and social influences on pain and pain management  - Notify physician/advanced practitioner if interventions unsuccessful or patient reports new pain  Outcome: Progressing     Problem: INFECTION - ADULT  Goal: Absence or prevention of progression during hospitalization  Description: INTERVENTIONS:  - Assess and monitor for signs and symptoms of infection  - Monitor lab/diagnostic results  - Monitor all insertion sites, i e  indwelling lines, tubes, and drains  - Monitor endotracheal if appropriate and nasal secretions for changes in amount and color  - Tensed appropriate cooling/warming therapies per order  - Administer medications as ordered  - Instruct and encourage patient and family to use good hand hygiene technique  - Identify and instruct in appropriate isolation precautions for identified infection/condition  Outcome: Progressing  Goal: Absence of fever/infection during neutropenic period  Description: INTERVENTIONS:  - Monitor WBC    Outcome: Progressing     Problem: SAFETY ADULT  Goal: Maintain or return to baseline ADL function  Description: INTERVENTIONS:  -  Assess patient's ability to carry out ADLs; assess patient's baseline for ADL function and identify physical deficits which impact ability to perform ADLs (bathing, care of mouth/teeth, toileting, grooming, dressing, etc )  - Assess/evaluate cause of self-care deficits   - Assess range of motion  - Assess patient's mobility; develop plan if impaired  - Assess patient's need for assistive devices and provide as appropriate  - Encourage maximum independence but intervene and supervise when necessary  - Involve family in performance of ADLs  - Assess for home care needs following discharge   - Consider OT consult to assist with ADL evaluation and planning for discharge  - Provide patient education as appropriate  Outcome: Progressing  Goal: Maintains/Returns to pre admission functional level  Description: INTERVENTIONS:  - Perform BMAT or MOVE assessment daily    - Set and communicate daily mobility goal to care team and patient/family/caregiver  - Collaborate with rehabilitation services on mobility goals if consulted  - Perform Range of Motion 3 times a day  - Reposition patient every 2 hours    - Dangle patient 3 times a day  - Stand patient 3 times a day  - Out of bed for toileting  - Record patient progress and toleration of activity level   Outcome: Progressing  Goal: Patient will remain free of falls  Description: INTERVENTIONS:  - Educate patient/family on patient safety including physical limitations  - Instruct patient to call for assistance with activity   - Consult OT/PT to assist with strengthening/mobility   - Keep Call bell within reach  - Keep bed low and locked with side rails adjusted as appropriate  - Keep care items and personal belongings within reach  - Initiate and maintain comfort rounds  - Make Fall Risk Sign visible to staff  - Offer Toileting every 2 Hours, in advance of need  - Initiate/Maintain bed alarm  - Obtain necessary fall risk management equipment  - Apply yellow socks and bracelet for high fall risk patients  - Consider moving patient to room near nurses station  Outcome: Progressing     Problem: DISCHARGE PLANNING  Goal: Discharge to home or other facility with appropriate resources  Description: INTERVENTIONS:  - Identify barriers to discharge w/patient and caregiver  - Arrange for needed discharge resources and transportation as appropriate  - Identify discharge learning needs (meds, wound care, etc )  - Arrange for interpretive services to assist at discharge as needed  - Refer to Case Management Department for coordinating discharge planning if the patient needs post-hospital services based on physician/advanced practitioner order or complex needs related to functional status, cognitive ability, or social support system  Outcome: Progressing     Problem: Knowledge Deficit  Goal: Patient/family/caregiver demonstrates understanding of disease process, treatment plan, medications, and discharge instructions  Description: Complete learning assessment and assess knowledge base    Interventions:  - Provide teaching at level of understanding  - Provide teaching via preferred learning methods  Outcome: Progressing     Problem: GASTROINTESTINAL - ADULT  Goal: Minimal or absence of nausea and/or vomiting  Description: INTERVENTIONS:  - Administer IV fluids if ordered to ensure adequate hydration  - Maintain NPO status until nausea and vomiting are resolved  - Nasogastric tube if ordered  - Administer ordered antiemetic medications as needed  - Provide nonpharmacologic comfort measures as appropriate  - Advance diet as tolerated, if ordered  - Consider nutrition services referral to assist patient with adequate nutrition and appropriate food choices  Outcome: Progressing  Goal: Maintains or returns to baseline bowel function  Description: INTERVENTIONS:  - Assess bowel function  - Encourage oral fluids to ensure adequate hydration  - Administer IV fluids if ordered to ensure adequate hydration  - Administer ordered medications as needed  - Encourage mobilization and activity  - Consider nutritional services referral to assist patient with adequate nutrition and appropriate food choices  Outcome: Progressing  Goal: Maintains adequate nutritional intake  Description: INTERVENTIONS:  - Monitor percentage of each meal consumed  - Identify factors contributing to decreased intake, treat as appropriate  - Assist with meals as needed  - Monitor I&O, weight, and lab values if indicated  - Obtain nutrition services referral as needed  Outcome: Progressing  Goal: Establish and maintain optimal ostomy function  Description: INTERVENTIONS:  - Assess bowel function  - Encourage oral fluids to ensure adequate hydration  - Administer IV fluids if ordered to ensure adequate hydration   - Administer ordered medications as needed  - Encourage mobilization and activity  - Nutrition services referral to assist patient with appropriate food choices  - Assess stoma site  - Consider wound care consult   Outcome: Progressing  Goal: Oral mucous membranes remain intact  Description: INTERVENTIONS  - Assess oral mucosa and hygiene practices  - Implement preventative oral hygiene regimen  - Implement oral medicated treatments as ordered  - Initiate Nutrition services referral as needed  Outcome: Progressing     Problem: Nutrition/Hydration-ADULT  Goal: Nutrient/Hydration intake appropriate for improving, restoring or maintaining nutritional needs  Description: Monitor and assess patient's nutrition/hydration status for malnutrition  Collaborate with interdisciplinary team and initiate plan and interventions as ordered  Monitor patient's weight and dietary intake as ordered or per policy  Utilize nutrition screening tool and intervene as necessary  Determine patient's food preferences and provide high-protein, high-caloric foods as appropriate       INTERVENTIONS:  - Monitor oral intake, urinary output, labs, and treatment plans  - Assess nutrition and hydration status and recommend course of action  - Evaluate amount of meals eaten  - Assist patient with eating if necessary   - Allow adequate time for meals  - Recommend/ encourage appropriate diets, oral nutritional supplements, and vitamin/mineral supplements  - Order, calculate, and assess calorie counts as needed  - Recommend, monitor, and adjust tube feedings and TPN/PPN based on assessed needs  - Assess need for intravenous fluids  - Provide specific nutrition/hydration education as appropriate  - Include patient/family/caregiver in decisions related to nutrition  Outcome: Progressing     Problem: HEMATOLOGIC - ADULT  Goal: Maintains hematologic stability  Description: INTERVENTIONS  - Assess for signs and symptoms of bleeding or hemorrhage  - Monitor labs  - Administer supportive blood products/factors as ordered and appropriate  Outcome: Progressing

## 2022-06-26 NOTE — PLAN OF CARE
Problem: Prexisting or High Potential for Compromised Skin Integrity  Goal: Skin integrity is maintained or improved  Description: INTERVENTIONS:  - Identify patients at risk for skin breakdown  - Assess and monitor skin integrity  - Assess and monitor nutrition and hydration status  - Monitor labs   - Assess for incontinence   - Turn and reposition patient  - Assist with mobility/ambulation  - Relieve pressure over bony prominences  - Avoid friction and shearing  - Provide appropriate hygiene as needed including keeping skin clean and dry  - Evaluate need for skin moisturizer/barrier cream  - Collaborate with interdisciplinary team   - Patient/family teaching  - Consider wound care consult   Outcome: Progressing     Problem: MOBILITY - ADULT  Goal: Maintain or return to baseline ADL function  Description: INTERVENTIONS:  -  Assess patient's ability to carry out ADLs; assess patient's baseline for ADL function and identify physical deficits which impact ability to perform ADLs (bathing, care of mouth/teeth, toileting, grooming, dressing, etc )  - Assess/evaluate cause of self-care deficits   - Assess range of motion  - Assess patient's mobility; develop plan if impaired  - Assess patient's need for assistive devices and provide as appropriate  - Encourage maximum independence but intervene and supervise when necessary  - Involve family in performance of ADLs  - Assess for home care needs following discharge   - Consider OT consult to assist with ADL evaluation and planning for discharge  - Provide patient education as appropriate  Outcome: Progressing  Goal: Maintains/Returns to pre admission functional level  Description: INTERVENTIONS:  - Perform BMAT or MOVE assessment daily    - Set and communicate daily mobility goal to care team and patient/family/caregiver  - Collaborate with rehabilitation services on mobility goals if consulted  - Perform Range of Motion 3 times a day    - Reposition patient every 2 hours   - Dangle patient 3 times a day  - Stand patient 3 times a day  - Out of bed for toileting  - Record patient progress and toleration of activity level   Outcome: Progressing     Problem: Potential for Falls  Goal: Patient will remain free of falls  Description: INTERVENTIONS:  - Educate patient/family on patient safety including physical limitations  - Instruct patient to call for assistance with activity   - Consult OT/PT to assist with strengthening/mobility   - Keep Call bell within reach  - Keep bed low and locked with side rails adjusted as appropriate  - Keep care items and personal belongings within reach  - Initiate and maintain comfort rounds  - Make Fall Risk Sign visible to staff  - Offer Toileting every 2 Hours, in advance of need  - Initiate/Maintain bed alarm  - Obtain necessary fall risk management equipment  - Apply yellow socks and bracelet for high fall risk patients  - Consider moving patient to room near nurses station  Outcome: Progressing     Problem: PAIN - ADULT  Goal: Verbalizes/displays adequate comfort level or baseline comfort level  Description: Interventions:  - Encourage patient to monitor pain and request assistance  - Assess pain using appropriate pain scale  - Administer analgesics based on type and severity of pain and evaluate response  - Implement non-pharmacological measures as appropriate and evaluate response  - Consider cultural and social influences on pain and pain management  - Notify physician/advanced practitioner if interventions unsuccessful or patient reports new pain  Outcome: Progressing     Problem: INFECTION - ADULT  Goal: Absence or prevention of progression during hospitalization  Description: INTERVENTIONS:  - Assess and monitor for signs and symptoms of infection  - Monitor lab/diagnostic results  - Monitor all insertion sites, i e  indwelling lines, tubes, and drains  - Monitor endotracheal if appropriate and nasal secretions for changes in amount and color  - [place] appropriate cooling/warming therapies per order  - Administer medications as ordered  - Instruct and encourage patient and family to use good hand hygiene technique  - Identify and instruct in appropriate isolation precautions for identified infection/condition  Outcome: Progressing  Goal: Absence of fever/infection during neutropenic period  Description: INTERVENTIONS:  - Monitor WBC    Outcome: Progressing     Problem: SAFETY ADULT  Goal: Maintain or return to baseline ADL function  Description: INTERVENTIONS:  -  Assess patient's ability to carry out ADLs; assess patient's baseline for ADL function and identify physical deficits which impact ability to perform ADLs (bathing, care of mouth/teeth, toileting, grooming, dressing, etc )  - Assess/evaluate cause of self-care deficits   - Assess range of motion  - Assess patient's mobility; develop plan if impaired  - Assess patient's need for assistive devices and provide as appropriate  - Encourage maximum independence but intervene and supervise when necessary  - Involve family in performance of ADLs  - Assess for home care needs following discharge   - Consider OT consult to assist with ADL evaluation and planning for discharge  - Provide patient education as appropriate  Outcome: Progressing  Goal: Maintains/Returns to pre admission functional level  Description: INTERVENTIONS:  - Perform BMAT or MOVE assessment daily    - Set and communicate daily mobility goal to care team and patient/family/caregiver  - Collaborate with rehabilitation services on mobility goals if consulted  - Perform Range of Motion 3 times a day  - Reposition patient every 2 hours    - Dangle patient 3 times a day  - Stand patient 3 times a day  - Out of bed for toileting  - Record patient progress and toleration of activity level   Outcome: Progressing  Goal: Patient will remain free of falls  Description: INTERVENTIONS:  - Educate patient/family on patient safety including physical limitations  - Instruct patient to call for assistance with activity   - Consult OT/PT to assist with strengthening/mobility   - Keep Call bell within reach  - Keep bed low and locked with side rails adjusted as appropriate  - Keep care items and personal belongings within reach  - Initiate and maintain comfort rounds  - Make Fall Risk Sign visible to staff  - Offer Toileting every 2 Hours, in advance of need  - Initiate/Maintain bed alarm  - Obtain necessary fall risk management equipment  - Apply yellow socks and bracelet for high fall risk patients  - Consider moving patient to room near nurses station  Outcome: Progressing     Problem: DISCHARGE PLANNING  Goal: Discharge to home or other facility with appropriate resources  Description: INTERVENTIONS:  - Identify barriers to discharge w/patient and caregiver  - Arrange for needed discharge resources and transportation as appropriate  - Identify discharge learning needs (meds, wound care, etc )  - Arrange for interpretive services to assist at discharge as needed  - Refer to Case Management Department for coordinating discharge planning if the patient needs post-hospital services based on physician/advanced practitioner order or complex needs related to functional status, cognitive ability, or social support system  Outcome: Progressing     Problem: Knowledge Deficit  Goal: Patient/family/caregiver demonstrates understanding of disease process, treatment plan, medications, and discharge instructions  Description: Complete learning assessment and assess knowledge base    Interventions:  - Provide teaching at level of understanding  - Provide teaching via preferred learning methods  Outcome: Progressing     Problem: GASTROINTESTINAL - ADULT  Goal: Minimal or absence of nausea and/or vomiting  Description: INTERVENTIONS:  - Administer IV fluids if ordered to ensure adequate hydration  - Maintain NPO status until nausea and vomiting are resolved  - Nasogastric tube if ordered  - Administer ordered antiemetic medications as needed  - Provide nonpharmacologic comfort measures as appropriate  - Advance diet as tolerated, if ordered  - Consider nutrition services referral to assist patient with adequate nutrition and appropriate food choices  Outcome: Progressing  Goal: Maintains or returns to baseline bowel function  Description: INTERVENTIONS:  - Assess bowel function  - Encourage oral fluids to ensure adequate hydration  - Administer IV fluids if ordered to ensure adequate hydration  - Administer ordered medications as needed  - Encourage mobilization and activity  - Consider nutritional services referral to assist patient with adequate nutrition and appropriate food choices  Outcome: Progressing  Goal: Maintains adequate nutritional intake  Description: INTERVENTIONS:  - Monitor percentage of each meal consumed  - Identify factors contributing to decreased intake, treat as appropriate  - Assist with meals as needed  - Monitor I&O, weight, and lab values if indicated  - Obtain nutrition services referral as needed  Outcome: Progressing  Goal: Establish and maintain optimal ostomy function  Description: INTERVENTIONS:  - Assess bowel function  - Encourage oral fluids to ensure adequate hydration  - Administer IV fluids if ordered to ensure adequate hydration   - Administer ordered medications as needed  - Encourage mobilization and activity  - Nutrition services referral to assist patient with appropriate food choices  - Assess stoma site  - Consider wound care consult   Outcome: Progressing  Goal: Oral mucous membranes remain intact  Description: INTERVENTIONS  - Assess oral mucosa and hygiene practices  - Implement preventative oral hygiene regimen  - Implement oral medicated treatments as ordered  - Initiate Nutrition services referral as needed  Outcome: Progressing     Problem: HEMATOLOGIC - ADULT  Goal: Maintains hematologic stability  Description: INTERVENTIONS  - Assess for signs and symptoms of bleeding or hemorrhage  - Monitor labs  - Administer supportive blood products/factors as ordered and appropriate  Outcome: Progressing     Problem: Nutrition/Hydration-ADULT  Goal: Nutrient/Hydration intake appropriate for improving, restoring or maintaining nutritional needs  Description: Monitor and assess patient's nutrition/hydration status for malnutrition  Collaborate with interdisciplinary team and initiate plan and interventions as ordered  Monitor patient's weight and dietary intake as ordered or per policy  Utilize nutrition screening tool and intervene as necessary  Determine patient's food preferences and provide high-protein, high-caloric foods as appropriate       INTERVENTIONS:  - Monitor oral intake, urinary output, labs, and treatment plans  - Assess nutrition and hydration status and recommend course of action  - Evaluate amount of meals eaten  - Assist patient with eating if necessary   - Allow adequate time for meals  - Recommend/ encourage appropriate diets, oral nutritional supplements, and vitamin/mineral supplements  - Order, calculate, and assess calorie counts as needed  - Recommend, monitor, and adjust tube feedings and TPN/PPN based on assessed needs  - Assess need for intravenous fluids  - Provide specific nutrition/hydration education as appropriate  - Include patient/family/caregiver in decisions related to nutrition  Outcome: Progressing

## 2022-06-27 LAB
ANION GAP SERPL CALCULATED.3IONS-SCNC: 6 MMOL/L (ref 4–13)
BASOPHILS # BLD AUTO: 0.02 THOUSANDS/ΜL (ref 0–0.1)
BASOPHILS NFR BLD AUTO: 0 % (ref 0–1)
BUN SERPL-MCNC: 15 MG/DL (ref 5–25)
CALCIUM SERPL-MCNC: 7.4 MG/DL (ref 8.3–10.1)
CHLORIDE SERPL-SCNC: 104 MMOL/L (ref 100–108)
CO2 SERPL-SCNC: 23 MMOL/L (ref 21–32)
CREAT SERPL-MCNC: 0.96 MG/DL (ref 0.6–1.3)
EOSINOPHIL # BLD AUTO: 0.77 THOUSAND/ΜL (ref 0–0.61)
EOSINOPHIL NFR BLD AUTO: 11 % (ref 0–6)
ERYTHROCYTE [DISTWIDTH] IN BLOOD BY AUTOMATED COUNT: 20.6 % (ref 11.6–15.1)
GFR SERPL CREATININE-BSD FRML MDRD: 56 ML/MIN/1.73SQ M
GLUCOSE SERPL-MCNC: 102 MG/DL (ref 65–140)
HCT VFR BLD AUTO: 25 % (ref 34.8–46.1)
HGB BLD-MCNC: 8 G/DL (ref 11.5–15.4)
IMM GRANULOCYTES # BLD AUTO: 0.05 THOUSAND/UL (ref 0–0.2)
IMM GRANULOCYTES NFR BLD AUTO: 1 % (ref 0–2)
LYMPHOCYTES # BLD AUTO: 1.97 THOUSANDS/ΜL (ref 0.6–4.47)
LYMPHOCYTES NFR BLD AUTO: 28 % (ref 14–44)
MAGNESIUM SERPL-MCNC: 1.9 MG/DL (ref 1.6–2.6)
MCH RBC QN AUTO: 30 PG (ref 26.8–34.3)
MCHC RBC AUTO-ENTMCNC: 32 G/DL (ref 31.4–37.4)
MCV RBC AUTO: 94 FL (ref 82–98)
MONOCYTES # BLD AUTO: 0.84 THOUSAND/ΜL (ref 0.17–1.22)
MONOCYTES NFR BLD AUTO: 12 % (ref 4–12)
NEUTROPHILS # BLD AUTO: 3.44 THOUSANDS/ΜL (ref 1.85–7.62)
NEUTS SEG NFR BLD AUTO: 48 % (ref 43–75)
NRBC BLD AUTO-RTO: 0 /100 WBCS
PHOSPHATE SERPL-MCNC: 3 MG/DL (ref 2.3–4.1)
PLATELET # BLD AUTO: 343 THOUSANDS/UL (ref 149–390)
PMV BLD AUTO: 10.2 FL (ref 8.9–12.7)
POTASSIUM SERPL-SCNC: 4.2 MMOL/L (ref 3.5–5.3)
RBC # BLD AUTO: 2.67 MILLION/UL (ref 3.81–5.12)
SODIUM SERPL-SCNC: 133 MMOL/L (ref 136–145)
VIT A SERPL-MCNC: 20.7 UG/DL (ref 22–69.5)
WBC # BLD AUTO: 7.09 THOUSAND/UL (ref 4.31–10.16)

## 2022-06-27 PROCEDURE — 97116 GAIT TRAINING THERAPY: CPT

## 2022-06-27 PROCEDURE — 84100 ASSAY OF PHOSPHORUS: CPT | Performed by: INTERNAL MEDICINE

## 2022-06-27 PROCEDURE — 85025 COMPLETE CBC W/AUTO DIFF WBC: CPT | Performed by: INTERNAL MEDICINE

## 2022-06-27 PROCEDURE — 97110 THERAPEUTIC EXERCISES: CPT

## 2022-06-27 PROCEDURE — 83735 ASSAY OF MAGNESIUM: CPT | Performed by: INTERNAL MEDICINE

## 2022-06-27 PROCEDURE — 99222 1ST HOSP IP/OBS MODERATE 55: CPT | Performed by: SURGERY

## 2022-06-27 PROCEDURE — 99232 SBSQ HOSP IP/OBS MODERATE 35: CPT | Performed by: INTERNAL MEDICINE

## 2022-06-27 PROCEDURE — 80048 BASIC METABOLIC PNL TOTAL CA: CPT | Performed by: INTERNAL MEDICINE

## 2022-06-27 RX ADMIN — MISOPROSTOL 200 MCG: 200 TABLET ORAL at 16:43

## 2022-06-27 RX ADMIN — LEVOTHYROXINE SODIUM 112 MCG: 112 TABLET ORAL at 05:42

## 2022-06-27 RX ADMIN — ONDANSETRON 4 MG: 2 INJECTION INTRAMUSCULAR; INTRAVENOUS at 08:28

## 2022-06-27 RX ADMIN — SUCRALFATE 1 G: 1 TABLET ORAL at 12:22

## 2022-06-27 RX ADMIN — Medication: at 21:19

## 2022-06-27 RX ADMIN — SUCRALFATE 1 G: 1 TABLET ORAL at 16:43

## 2022-06-27 RX ADMIN — POTASSIUM CHLORIDE 40 MEQ: 20 TABLET, EXTENDED RELEASE ORAL at 18:22

## 2022-06-27 RX ADMIN — POTASSIUM CHLORIDE 40 MEQ: 20 TABLET, EXTENDED RELEASE ORAL at 08:28

## 2022-06-27 RX ADMIN — MISOPROSTOL 200 MCG: 200 TABLET ORAL at 21:25

## 2022-06-27 RX ADMIN — SUCRALFATE 1 G: 1 TABLET ORAL at 21:24

## 2022-06-27 RX ADMIN — SUCRALFATE 1 G: 1 TABLET ORAL at 05:42

## 2022-06-27 RX ADMIN — ACETAMINOPHEN 325MG 650 MG: 325 TABLET ORAL at 08:28

## 2022-06-27 RX ADMIN — PANTOPRAZOLE SODIUM 40 MG: 40 TABLET, DELAYED RELEASE ORAL at 16:43

## 2022-06-27 RX ADMIN — CLONAZEPAM 1 MG: 1 TABLET ORAL at 21:24

## 2022-06-27 RX ADMIN — PANTOPRAZOLE SODIUM 40 MG: 40 TABLET, DELAYED RELEASE ORAL at 05:42

## 2022-06-27 RX ADMIN — MISOPROSTOL 200 MCG: 200 TABLET ORAL at 12:21

## 2022-06-27 RX ADMIN — FOLIC ACID 1 MG: 1 TABLET ORAL at 08:28

## 2022-06-27 RX ADMIN — MISOPROSTOL 200 MCG: 200 TABLET ORAL at 08:29

## 2022-06-27 RX ADMIN — THIAMINE HCL TAB 100 MG 100 MG: 100 TAB at 08:28

## 2022-06-27 NOTE — ASSESSMENT & PLAN NOTE
· Continue Klonopin  · Seen by neuropsychiatry 6/13 deemed patient not to have capacity to make decisions  · Ruddy Jeffries is POA

## 2022-06-27 NOTE — PHYSICAL THERAPY NOTE
PHYSICAL THERAPY TREATMENT NOTE  NAME:  Federica Mauricio  DATE: 06/27/22    Length Of Stay: 11  Performed at least 2 patient identifiers during session: Name and Birthday  Treatment time: 9930-2480  Treatment length: 29 min     06/27/22 1518   Note Type   Note Type Treatment   Pain Assessment   Pain Assessment Tool 0-10   Pain Score No Pain   Restrictions/Precautions   Other Precautions Chair Alarm; Bed Alarm; Fall Risk   General   Chart Reviewed Yes   Response to Previous Treatment Patient with no complaints from previous session  Cognition   Orientation Level Oriented X4   Following Commands Follows one step commands without difficulty   Bed Mobility   Rolling R 5  Supervision   Additional items Increased time required;Verbal cues   Rolling L 5  Supervision   Additional items Increased time required;Verbal cues   Supine to Sit 5  Supervision   Additional items Increased time required;Verbal cues   Sit to Supine 5  Supervision   Additional items Increased time required;Verbal cues   Additional Comments Bedrails PRN, VC for technique, pt incontinent of urine upon start of session   Transfers   Sit to Stand 4  Minimal assistance   Additional items Assist x 1; Increased time required;Verbal cues   Stand to Sit 4  Minimal assistance   Additional items Assist x 1; Increased time required;Verbal cues   Additional Comments Pt used RW for all transfers  VC for hand placement and min A for force production   Ambulation/Elevation   Gait pattern Improper Weight shift;Narrow BRANDAN; Decreased foot clearance; Short stride; Excessively slow   Gait Assistance 4  Minimal assist   Additional items Assist x 1;Verbal cues   Assistive Device Rolling walker   Distance 40' x 2   Ambulation/Elevation Additional Comments Pt reporting dizziness during gait, returned to sitting EOB with BP assessed at 102/55, pt reporting dizziness is subsiding, agreeable to participate in seated ther ex however limited as dizziness worsened   Returned pt to supine with BP assessed at 121/71, RN Surinder Miles made aware   Balance   Static Sitting Good   Dynamic Sitting Fair   Static Standing Fair -   Dynamic Standing Poor +   Ambulatory Poor +   Endurance Deficit   Endurance Deficit Yes   Endurance Deficit Description fatigue, dizziness   Activity Tolerance   Activity Tolerance Patient limited by fatigue   Nurse Made Aware Aaron DALE   Exercises   Hip Flexion Supine;AROM; Bilateral;10 reps   Knee AROM Long Arc Quad Sitting;10 reps;AROM; Bilateral   Ankle Pumps Supine;10 reps;AROM; Bilateral   Neuro re-ed Blocked STS x 3 reps with RW and min A   Assessment   Prognosis Good   Problem List Decreased strength;Decreased endurance; Impaired balance;Decreased mobility; Impaired judgement;Decreased safety awareness   Barriers to Discharge Inaccessible home environment;Decreased caregiver support   Goals   Patient Goals To go to rehab   STG Expiration Date 07/11/22   PT Treatment Day 2   Plan   Treatment/Interventions Functional transfer training;LE strengthening/ROM; Elevations; Therapeutic exercise; Endurance training;Patient/family training;Equipment eval/education; Bed mobility;Gait training; Compensatory technique education;Spoke to nursing   Progress Progressing toward goals   PT Frequency 3-5x/wk   Recommendation   PT Discharge Recommendation Post acute rehabilitation services   Equipment Recommended   (TBD by rehab)   AM-PAC Basic Mobility Inpatient   Turning in Bed Without Bedrails 3   Lying on Back to Sitting on Edge of Flat Bed 3   Moving Bed to Chair 3   Standing Up From Chair 3   Walk in Room 3   Climb 3-5 Stairs 2   Basic Mobility Inpatient Raw Score 17   Basic Mobility Standardized Score 39 67   Highest Level Of Mobility   JH-HLM Goal 5: Stand one or more mins   JH-HLM Achieved 7: Walk 25 feet or more   Education   Education Provided Mobility training;Home exercise program   Patient Demonstrates acceptance/verbal understanding   End of Consult   Patient Position at End of Consult Supine;Bed/Chair alarm activated; All needs within reach   End of Consult Comments pt declining to sit OOB in recliner     The patient's AM-PAC Basic Mobility Inpatient Short Form Raw Score is 17  A Raw score of greater than 16 suggests the patient may benefit from discharge to home however due to functional decline pt is functioning below baseline and would benefit from STR  Please also refer to the recommendation of the Physical Therapist for safe discharge planning  Assessment: Pt seen for PT treatment session this date with interventions consisting of gait training w/ emphasis on improving pt's ability to ambulate level surfaces x 40' x 2 with min A provided by therapist with RW and Therapeutic exercise consisting of: AROM 10 reps B LE in sitting and supine position  Pt agreeable to PT treatment session upon arrival, pt found supine in bed w/ HOB elevated, in no apparent distress  In comparison to previous session, pt with improvements in activity tolerance with pt ambulating increased distance and participating in therex however limited by onset of dizziness which subsided with sitting and then supine  Post session: pt returned BTB, bed alarm engaged, all needs in reach, and RN notified of session findings/recommendations Continue to recommend STR at time of d/c in order to maximize pt's functional independence and safety w/ mobility  Pt continues to be functioning below baseline level, and remains limited 2* factors listed above and including decreased strength, balance, mobility, and activity tolerance  PT will continue to see pt while here in order to address the deficits listed above and provide interventions consistent w/ POC in effort to achieve STGs  Goals to be achieved by 7/11/22: Pt will: Perform bed mobility tasks with modified I to reposition in bed and prepare for transfers  Pt will perform transfers with modified I to increase Indep in home alone environment and prepare for ambulation   Pt will ambulate with RW for >/= 150' with  modified I  to improve gait quality and promote proper use of assistive device and to access home environment  Pt will complete >/= 12 steps with with unilateral handrail with modified I to return to home with WILLI and return to multilevel home  Increase bilateral LE strength 1/2 grade to facilitate independent mobility and Increase all balance 1/2 grade to decrease risk for falls          Mayuri Quezada, PT,DPT             Mayuri Quezada, PT,DPT

## 2022-06-27 NOTE — ASSESSMENT & PLAN NOTE
Likely related to poor oral intake  Currently on TPN managed by bariatric service  Sodium level stable    Will continue to monitor

## 2022-06-27 NOTE — PLAN OF CARE
Problem: PHYSICAL THERAPY ADULT  Goal: Performs mobility at highest level of function for planned discharge setting  See evaluation for individualized goals  Description: Treatment/Interventions: Functional transfer training, LE strengthening/ROM, Elevations, Therapeutic exercise, Endurance training, Cognitive reorientation, Patient/family training, Equipment eval/education, Gait training, Spoke to nursing, OT  Equipment Recommended:  (tbd)       See flowsheet documentation for full assessment, interventions and recommendations  Note: Prognosis: Good  Problem List: Decreased strength, Decreased endurance, Impaired balance, Decreased mobility, Impaired judgement, Decreased safety awareness  Assessment: Pt seen for PT treatment session this date with interventions consisting of gait training w/ emphasis on improving pt's ability to ambulate level surfaces x 40' x 2 with min A provided by therapist with RW and Therapeutic exercise consisting of: AROM 10 reps B LE in sitting and supine position  Pt agreeable to PT treatment session upon arrival, pt found supine in bed w/ HOB elevated, in no apparent distress  In comparison to previous session, pt with improvements in activity tolerance with pt ambulating increased distance and participating in therex however limited by onset of dizziness which subsided with sitting and then supine  Post session: pt returned BTB, bed alarm engaged, all needs in reach, and RN notified of session findings/recommendations Continue to recommend STR at time of d/c in order to maximize pt's functional independence and safety w/ mobility  Pt continues to be functioning below baseline level, and remains limited 2* factors listed above and including decreased strength, balance, mobility, and activity tolerance  PT will continue to see pt while here in order to address the deficits listed above and provide interventions consistent w/ POC in effort to achieve STGs    Barriers to Discharge: Inaccessible home environment, Decreased caregiver support  Barriers to Discharge Comments: WILLI, increased assistance required for mobility, pt lives alone     PT Discharge Recommendation: Post acute rehabilitation services          See flowsheet documentation for full assessment  - - -

## 2022-06-27 NOTE — CASE MANAGEMENT
Case Management Discharge Planning Note    Patient name Дмитрий Aquino  Location [place]  /[address] MRN [id number]  : 1945 Date 2022       Current Admission Date: 2022  Current Admission Diagnosis:Acute blood loss anemia   Patient Active Problem List    Diagnosis Date Noted    Severe protein-calorie malnutrition ([address] ) 2022    Bilateral leg edema 2022    Ambulatory dysfunction 2022    Acute blood loss anemia 2022    Gastric ulcer 2022    Fever 2022    Anemia 2022    Dyspnea on exertion 2022    Generalized weakness 2022    Hyponatremia 2022    Abnormal CT scan 2022    H/O bariatric surgery - bypass 2022    Cerumen debris on tympanic membrane of right ear 2022    Nasal congestion 2022    Bilateral hearing loss 2022    Fibromyalgia syndrome 2021    Osteopenia of both forearms 2021    Medicare annual wellness visit, subsequent 2021    Shortness of breath 2021    Acute bronchitis 2021    COVID-19 2021    Dysphasia 2020    Epigastric pain 2020    Hypothyroidism 2020    Gastroesophageal reflux disease without esophagitis 2020    Depression 2020    Fibromyalgia, primary 2020    Iron deficiency 2020    Numbness of foot 2020      LOS (days): 11  Geometric Mean LOS (GMLOS) (days): 4 40  Days to GMLOS:-6 2     OBJECTIVE:  Risk of Unplanned Readmission Score: 19 33         Current admission status: Inpatient   Preferred Pharmacy:   [place] [address] [address]  Phone: [phone] Fax: [phone]    Primary Care Provider: Chaparrita Acosta MD    Primary Insurance: [facility]  Secondary Insurance:     DISCHARGE DETAILS: [address] [place] [address] Number: Approved [facility] Maya Meigs # [id number] Gennaro Gloria ref# [id number]) [facility]: 6/24 NRD: 6/28 Care coord: Misb[facility] Fax review to [phone]

## 2022-06-27 NOTE — PROGRESS NOTES
Diet advanced today to bariatric soft adaptive, PT is very happy about this, reported being tired of broths, refusing ensure enlive supplements  PT ordered egg salad, mashed potato, and peaches for lunch  Reviewed labs: Na 133  Recommend adjusting TPN to AA15 500mL, D40 600mL, lipids 20% 200mL provides total of 1516cal, 75g pro, 1300mL, lipid load   7g/kg/day, glucose load 3 2mg/kg/min, will continue to monitor po intake and adjust TPN recs accordingly, most likely can start weaning TPN to standard TPN tomorrow if PT tolerates po today

## 2022-06-27 NOTE — PROGRESS NOTES
[address]  Progress Note - Selma Ax 1945, 64 y o  female MRN: [id number]  Unit/Bed#: E5 -01 Encounter: [id number]  Primary Care Provider: Pedro Castano MD   Date and time admitted to hospital: 6/16/2022  9:16 PM    * Acute blood loss anemia  Assessment & Plan  This is a 73-year-old female with history of Savage-en-Y gastric bypass, symptomatic anemia, gastric ulcer, hypothyroidism, depression initially admitted on 06/11/2022 to [facility] for hematemesis and blood per rectum Secondary to marginal/anastomotic ulcer status post EGD x2  · Status post total 6 unit PRBCs  · Hemoglobin level appears to be stable  · Transfuse further as needed to maintain greater than 7  · Appreciate GI, bariatric recommendations  · Patient is cleared for discharge, case management working on placement    Gastric ulcer  Assessment & Plan  · EGD 6/12 revealed single large deep irregular benign appearing ulcer gastrojejunal anastomoses with adherent clot status post 2 clips, injected epinephrine and hemostasis achieved  · Emergent EGD 06/15/2022 revealed single large deep ulcer in gastrojejunal anastomosis with nonbleeding visible vessel, induced coagulation and hemostasis achieved with cautery and epinephrine injection  · Bariatric surgery follow-up appreciated  · Full bariatric diet  · Continue Protonix b i d , Carafate, Cytotec  · PICC line placed, initiated on TPN  Management per bariatrics   · Plan for nutritional optimization and potential outpatient revision  Plan for repeat endoscopy 6 weeks after discharge      Bilateral leg edema  Assessment & Plan  · Lower extremity Doppler negative for DVT  · Likely due to low albumin and 3rd spacing  · Elevate when resting  · Improving  · Compression stockings on discharge     Severe protein-calorie malnutrition (HCC)  Assessment & Plan  Malnutrition Findings:   Adult Malnutrition type: Chronic illness  BMI Findings:       Body mass index is 19 45 kg/m²  TPN initiated  Full bariatric diet    Ambulatory dysfunction  Assessment & Plan  Appreciate PT/OT recommendations  Awaiting transfer to rehab    H/O bariatric surgery - bypass  Assessment & Plan  · 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 at [facility]  · Outpatient bariatric follow-up    Hyponatremia  Assessment & Plan  Likely related to poor oral intake  Currently on TPN managed by bariatric service  Sodium level stable  Will continue to monitor    Depression  Assessment & Plan  · Continue Klonopin  · Seen by neuropsychiatry  deemed patient not to have capacity to make decisions  · Brother Marc Ly is POA    Hypothyroidism  Assessment & Plan  · Continue Synthroid    VTE Prophylaxis:  Held due to GI bleed    Patient Centered Rounds: I have performed bedside rounds with nursing staff today  Discussions with Specialists or Other Care Team Provider: yes  Education and Discussions with Family / Patient:  Spoke with patient's sister over the phone regarding plan of care    Current Length of Stay: 11 day(s)    Current Patient Status: Inpatient   Certification Statement: The patient will continue to require additional inpatient hospital stay due to GI bleed    Discharge Plan:  Hopeful discharge tomorrow to rehab    Code Status: Level 1 - Full Code    Subjective:   No overnight events noted  Patient states she is hungry, currently on bariatric diet    Objective:     Vitals:   Temp (24hrs), Av 1 °F (36 7 °C), Min:97 8 °F (36 6 °C), Max:98 4 °F (36 9 °C)    Temp:  [97 8 °F (36 6 °C)-98 4 °F (36 9 °C)] 97 8 °F (36 6 °C)  HR:  [76-90] 88  Resp:  [18] 18  BP: (102-121)/(50-71) 121/71  SpO2:  [96 %-98 %] 96 %  Body mass index is 19 45 kg/m²  Input and Output Summary (last 24 hours):        Intake/Output Summary (Last 24 hours) at 2022 1525  Last data filed at 2022 1430  Gross per 24 hour   Intake 1044 8 ml   Output --   Net 1044 8 ml       Physical Exam:   Physical Exam  Constitutional:       General: She is not in acute distress  Comments: Frail  female   HENT:      Head: Normocephalic and atraumatic  Nose: Nose normal       Mouth/Throat:      Mouth: Mucous membranes are moist    Eyes:      Extraocular Movements: Extraocular movements intact  Conjunctiva/sclera: Conjunctivae normal    Cardiovascular:      Rate and Rhythm: Normal rate and regular rhythm  Pulmonary:      Effort: Pulmonary effort is normal  No respiratory distress  Abdominal:      Palpations: Abdomen is soft  Tenderness: There is no abdominal tenderness  Musculoskeletal:         General: Normal range of motion  Cervical back: Normal range of motion and neck supple  Skin:     General: Skin is warm and dry  Neurological:      General: No focal deficit present  Mental Status: She is alert  Cranial Nerves: No cranial nerve deficit  Psychiatric:         Mood and Affect: Mood normal          Behavior: Behavior normal          Additional Data:     Labs:    Results from last 7 days   Lab Units 06/27/22  0552   WBC Thousand/uL 7 09   HEMOGLOBIN g/dL 8 0*   HEMATOCRIT % 25 0*   PLATELETS Thousands/uL 343   NEUTROS PCT % 48   LYMPHS PCT % 28   MONOS PCT % 12   EOS PCT % 11*     Results from last 7 days   Lab Units 06/27/22  0552 06/26/22  0515   SODIUM mmol/L 133* 133*   POTASSIUM mmol/L 4 2 4 4   CHLORIDE mmol/L 104 105   CO2 mmol/L 23 23   BUN mg/dL 15 16   CREATININE mg/dL 0 96 0 84   CALCIUM mg/dL 7 4* 6 9*   ALK PHOS U/L  --  43*   ALT U/L  --  14   AST U/L  --  33                   * I Have Reviewed All Lab Data Listed Above  * Additional Pertinent Lab Tests Reviewed:  [facility] 64 Admission  Reviewed    Imaging:  Imaging Reports Reviewed Today Include:  No new imaging    Recent Cultures (last 7 days):           Last 24 Hours Medication List:   Current Facility-Administered Medications   Medication Dose Route Frequency Provider Last Rate    acetaminophen  650 mg Oral Q6H PRN Chritsi Yoder MD      Adult TPN (CUSTOM BASE/CUSTOM ELECTROLYTE)   Intravenous Continuous TPN Jovita Warner, DO 63 3 mL/hr at 06/26/22 2151    Adult TPN (CUSTOM BASE/CUSTOM ELECTROLYTE)   Intravenous Continuous TPN Jovita Warner, DO      clonazePAM  1 mg Oral HS Christi Yoder MD      folic acid  1 mg Oral Daily Meridith Babinski, DO      levothyroxine  112 mcg Oral Early Morning Christi Yoder MD      misoprostol  200 mcg Oral 4x Daily (with meals and at bedtime) Christi Yoder MD      ondansetron  4 mg Intravenous Q4H PRN Christi Yoder MD      pantoprazole  40 mg Oral BID AC Meridith Babinski, DO      potassium chloride  40 mEq Oral BID Christi Yoder MD      sucralfate  1 g Oral 4x Daily (AC & HS) Christi Yoder MD      thiamine  100 mg Oral Daily Meridith Babinski, DO          Today, Patient Was Seen By: Sebastian Cano MD    ** Please Note: Dictation voice to text software may have been used in the creation of this document   **

## 2022-06-27 NOTE — PROGRESS NOTES
Consultation - Bariatric Surgery   Keke Schreiber 64 y o  female MRN: [id number]  Unit/Bed#: E5 -01 Encounter: [id number]    Assessment/Plan     Assessment:  63 yo female with hx of RNYGB at [facility] in 2012 with hx of known marginal ulcers who presented to [address] [place] [address] with a bleeding marginal ulcer now s/p EGDx2 with GI     Plan:  - Continue PPI BID  Continue to trend H/H q24h  - Zofran PRN for nausea/vomiting  - Transfuse for Hgb < 7  I have ordered a repeat CBC to confirm the lab value of 6 6   - Advance to a soft diet  - If patient rebleeds recommend contact IR or GI for possible interventions before taking the patient to the OR  - Continue cytotec and carafate  - Continue TPN for nutritional optimization and plan for potential outpatient revision  Will change to custom base per nutrition recs and adjust electrolytes as sodium is still low  - Patient will require repeat endoscopy after discharge  - No further Bariatric Surgery intervention necessary at this time  We will continue to follow the patient once discharged  Please reconsult for any concerns  History of Present Illness     Subjective:  Patient denies overnight events  She denies abdominal pain  States shehad a soft diet and was so happy she could cry  She has been feeling well since starting a soft diet  Per patient and per nursing report, there has been no evidence of blood in her stool  Tolerating fulls, but does not like to eat  Hospital Course:  Keke Schreiber is a 64 y o  female Body mass index is 19 45 kg/m²  with history of RNYGB at [facility] in 2012 who presented to [address] [place] [address] ER on 6/11/22 with UGIB  She has undergone two EGDs with GI on 6/12 and 6/15 that showed a large, deep, irregular ulcer at the [address] [facility] anastomosis with nonbleeding visible vessel  Induced coagulation and hemostasis achieved with 2 applications of bipolar cautery, epinephrine injection   Since admission she has received 6 U pRBCs    She was transferred to [facility] on 6/16/22 for bariatric surgery evaluation  6/18 - no acute events overnight  Her H/H has remained stable for the past 24 hrs (7 -> 7 8 -> 7 3 -> 7 2) without requiring any transfusions  She has remained hemodynamically stable  She denies nausea and vomiting as well as abdominal pain  6/19 - transferred out of the ICU yesterday  H/H continues to remain stable without further transfusions  She denies nausea and vomiting  No bloody bowel movements per nursing    6/20- PICC placed    6/25- received 1 u pRBC for downtrending Hgb, with appropriate response    Consults    Review of Systems   Constitutional: Negative for chills and fever  HENT: Negative for ear pain and sore throat  Eyes: Negative for pain and visual disturbance  Respiratory: Negative for cough and shortness of breath  Cardiovascular: Negative for chest pain and palpitations  Gastrointestinal: Negative for abdominal pain and vomiting  Genitourinary: Negative for dysuria and hematuria  Musculoskeletal: Negative for arthralgias and back pain  Skin: Negative for color change and rash  Neurological: Negative for dizziness, seizures and syncope  All other systems reviewed and are negative        Historical Information   Past Medical History:   Diagnosis Date    Anemia     Anxiety     Bipolar disorder (HCC)     Colon polyp     Disease of thyroid gland     Essential hypertension     Essential tremor     Fibromyalgia, primary     GERD (gastroesophageal reflux disease)     Inflammatory polyarthropathy (HCC)     Mammogram abnormal      Past Surgical History:   Procedure Laterality Date    BREAST BIOPSY Right 2015    benign    CARPAL TUNNEL RELEASE Bilateral     COLONOSCOPY      EGD AND COLONOSCOPY  01/01/2014    GASTRIC BYPASS  07/01/2012    MAMMO NEEDLE LOCALIZATION RIGHT (ALL INC) Right 2/6/2012    MAMMO NEEDLE LOCALIZATION RIGHT (ALL INC) Right 2/6/2012    ULNAR NERVE TRANSPOSITION Right      Social History   Social History     Substance and Sexual Activity   Alcohol Use Yes    Alcohol/week: 4 0 standard drinks    Types: 4 Glasses of wine per week    Comment: rare     Social History     Substance and Sexual Activity   Drug Use Never     Social History     Tobacco Use   Smoking Status Former Smoker    Quit date:     Years since quittin 5   Smokeless Tobacco Never Used     Family History: Non-contributory      Meds/Allergies   No Known Allergies    Objective   First Vitals:   Blood Pressure: 105/65 (22)  Pulse: 84 (22)  Temperature: (!) 97 2 °F (36 2 °C) (22)  Temp Source: Temporal (22)  Respirations: 20 (22)  Height: 5' 4" (162 6 cm) (22)  Weight - Scale: 57 4 kg (126 lb 8 7 oz) (22)  SpO2: 99 % (22)    Current Vitals:   Blood Pressure: 109/59 (22)  Pulse: 78 (22)  Temperature: 98 2 °F (36 8 °C) (22)  Temp Source: Oral (22)  Respirations: 18 (22)  Height: 5' 4" (162 6 cm) (22)  Weight - Scale: 51 4 kg (113 lb 5 1 oz) (22 0557)  SpO2: 98 % (22 2316)      Intake/Output Summary (Last 24 hours) at 2022 1112  Last data filed at 2022 1700  Gross per 24 hour   Intake 540 ml   Output 687 ml   Net -147 ml       Invasive Devices  Report      Peripherally Inserted Central Catheter Line  Duration             PICC Line / Right Basilic 6 days                    Physical Exam  Constitutional:       Appearance: Normal appearance  She is normal weight  Comments: Frail appearing    HENT:      Head: Normocephalic and atraumatic  Cardiovascular:      Rate and Rhythm: Normal rate  Pulmonary:      Effort: Pulmonary effort is normal    Abdominal:      General: Abdomen is flat  There is no distension  Palpations: Abdomen is soft  There is no mass  Tenderness: There is no guarding or rebound  Musculoskeletal:         General: Normal range of motion  Skin:     General: Skin is warm and dry  Neurological:      Mental Status: She is alert     Psychiatric:         Mood and Affect: Mood normal          Behavior: Behavior normal

## 2022-06-27 NOTE — PLAN OF CARE
Diet advanced today to bariatric soft adaptive, PT is very happy about this, reported being tired of broths, refusing ensure enlive supplements  PT ordered egg salad, mashed potato, and peaches for lunch  Reviewed labs: Na 133  Recommend adjusting TPN to AA15 500mL, D40 600mL, lipids 20% 200mL provides total of 1516cal, 75g pro, 1300mL, lipid load   7g/kg/day, glucose load 3 2mg/kg/min, will continue to monitor po intake and adjust TPN recs accordingly, most likely can start weaning TPN to standard TPN tomorrow if PT tolerates po today  Problem: Nutrition/Hydration-ADULT  Goal: Nutrient/Hydration intake appropriate for improving, restoring or maintaining nutritional needs  Description: Monitor and assess patient's nutrition/hydration status for malnutrition  Collaborate with interdisciplinary team and initiate plan and interventions as ordered  Monitor patient's weight and dietary intake as ordered or per policy  Utilize nutrition screening tool and intervene as necessary  Determine patient's food preferences and provide high-protein, high-caloric foods as appropriate       INTERVENTIONS:  - Monitor oral intake, urinary output, labs, and treatment plans  - Assess nutrition and hydration status and recommend course of action  - Evaluate amount of meals eaten  - Assist patient with eating if necessary   - Allow adequate time for meals  - Recommend/ encourage appropriate diets, oral nutritional supplements, and vitamin/mineral supplements  - Order, calculate, and assess calorie counts as needed  - Recommend, monitor, and adjust tube feedings and TPN/PPN based on assessed needs  - Assess need for intravenous fluids  - Provide specific nutrition/hydration education as appropriate  - Include patient/family/caregiver in decisions related to nutrition  Outcome: Progressing

## 2022-06-27 NOTE — ASSESSMENT & PLAN NOTE
Malnutrition Findings:   Adult Malnutrition type: Chronic illness  BMI Findings: Body mass index is 19 45 kg/m²       TPN initiated  Full bariatric diet

## 2022-06-27 NOTE — PLAN OF CARE
Problem: Prexisting or High Potential for Compromised Skin Integrity  Goal: Skin integrity is maintained or improved  Description: INTERVENTIONS:  - Identify patients at risk for skin breakdown  - Assess and monitor skin integrity  - Assess and monitor nutrition and hydration status  - Monitor labs   - Assess for incontinence   - Turn and reposition patient  - Assist with mobility/ambulation  - Relieve pressure over bony prominences  - Avoid friction and shearing  - Provide appropriate hygiene as needed including keeping skin clean and dry  - Evaluate need for skin moisturizer/barrier cream  - Collaborate with interdisciplinary team   - Patient/family teaching  - Consider wound care consult   Outcome: Progressing     Problem: MOBILITY - ADULT  Goal: Maintain or return to baseline ADL function  Description: INTERVENTIONS:  -  Assess patient's ability to carry out ADLs; assess patient's baseline for ADL function and identify physical deficits which impact ability to perform ADLs (bathing, care of mouth/teeth, toileting, grooming, dressing, etc )  - Assess/evaluate cause of self-care deficits   - Assess range of motion  - Assess patient's mobility; develop plan if impaired  - Assess patient's need for assistive devices and provide as appropriate  - Encourage maximum independence but intervene and supervise when necessary  - Involve family in performance of ADLs  - Assess for home care needs following discharge   - Consider OT consult to assist with ADL evaluation and planning for discharge  - Provide patient education as appropriate  Outcome: Progressing  Goal: Maintains/Returns to pre admission functional level  Description: INTERVENTIONS:  - Perform BMAT or MOVE assessment daily    - Set and communicate daily mobility goal to care team and patient/family/caregiver  - Collaborate with rehabilitation services on mobility goals if consulted  - Perform Range of Motion 3 times a day    - Reposition patient every 2 hours   - Dangle patient 3 times a day  - Stand patient 3 times a day  - Out of bed for toileting  - Record patient progress and toleration of activity level   Outcome: Progressing     Problem: Potential for Falls  Goal: Patient will remain free of falls  Description: INTERVENTIONS:  - Educate patient/family on patient safety including physical limitations  - Instruct patient to call for assistance with activity   - Consult OT/PT to assist with strengthening/mobility   - Keep Call bell within reach  - Keep bed low and locked with side rails adjusted as appropriate  - Keep care items and personal belongings within reach  - Initiate and maintain comfort rounds  - Make Fall Risk Sign visible to staff  - Offer Toileting every 2 Hours, in advance of need  - Initiate/Maintain bed alarm  - Obtain necessary fall risk management equipment  - Apply yellow socks and bracelet for high fall risk patients  - Consider moving patient to room near nurses station  Outcome: Progressing     Problem: PAIN - ADULT  Goal: Verbalizes/displays adequate comfort level or baseline comfort level  Description: Interventions:  - Encourage patient to monitor pain and request assistance  - Assess pain using appropriate pain scale  - Administer analgesics based on type and severity of pain and evaluate response  - Implement non-pharmacological measures as appropriate and evaluate response  - Consider cultural and social influences on pain and pain management  - Notify physician/advanced practitioner if interventions unsuccessful or patient reports new pain  Outcome: Progressing     Problem: INFECTION - ADULT  Goal: Absence or prevention of progression during hospitalization  Description: INTERVENTIONS:  - Assess and monitor for signs and symptoms of infection  - Monitor lab/diagnostic results  - Monitor all insertion sites, i e  indwelling lines, tubes, and drains  - Monitor endotracheal if appropriate and nasal secretions for changes in amount and color  - [place] appropriate cooling/warming therapies per order  - Administer medications as ordered  - Instruct and encourage patient and family to use good hand hygiene technique  - Identify and instruct in appropriate isolation precautions for identified infection/condition  Outcome: Progressing  Goal: Absence of fever/infection during neutropenic period  Description: INTERVENTIONS:  - Monitor WBC    Outcome: Progressing     Problem: SAFETY ADULT  Goal: Maintain or return to baseline ADL function  Description: INTERVENTIONS:  -  Assess patient's ability to carry out ADLs; assess patient's baseline for ADL function and identify physical deficits which impact ability to perform ADLs (bathing, care of mouth/teeth, toileting, grooming, dressing, etc )  - Assess/evaluate cause of self-care deficits   - Assess range of motion  - Assess patient's mobility; develop plan if impaired  - Assess patient's need for assistive devices and provide as appropriate  - Encourage maximum independence but intervene and supervise when necessary  - Involve family in performance of ADLs  - Assess for home care needs following discharge   - Consider OT consult to assist with ADL evaluation and planning for discharge  - Provide patient education as appropriate  Outcome: Progressing  Goal: Maintains/Returns to pre admission functional level  Description: INTERVENTIONS:  - Perform BMAT or MOVE assessment daily    - Set and communicate daily mobility goal to care team and patient/family/caregiver  - Collaborate with rehabilitation services on mobility goals if consulted  - Perform Range of Motion 3 times a day  - Reposition patient every 2 hours    - Dangle patient 3 times a day  - Stand patient 3 times a day  - Out of bed for toileting  - Record patient progress and toleration of activity level   Outcome: Progressing  Goal: Patient will remain free of falls  Description: INTERVENTIONS:  - Educate patient/family on patient safety including physical limitations  - Instruct patient to call for assistance with activity   - Consult OT/PT to assist with strengthening/mobility   - Keep Call bell within reach  - Keep bed low and locked with side rails adjusted as appropriate  - Keep care items and personal belongings within reach  - Initiate and maintain comfort rounds  - Make Fall Risk Sign visible to staff  - Offer Toileting every 2 Hours, in advance of need  - Initiate/Maintain bed alarm  - Obtain necessary fall risk management equipment  - Apply yellow socks and bracelet for high fall risk patients  - Consider moving patient to room near nurses station  Outcome: Progressing     Problem: DISCHARGE PLANNING  Goal: Discharge to home or other facility with appropriate resources  Description: INTERVENTIONS:  - Identify barriers to discharge w/patient and caregiver  - Arrange for needed discharge resources and transportation as appropriate  - Identify discharge learning needs (meds, wound care, etc )  - Arrange for interpretive services to assist at discharge as needed  - Refer to Case Management Department for coordinating discharge planning if the patient needs post-hospital services based on physician/advanced practitioner order or complex needs related to functional status, cognitive ability, or social support system  Outcome: Progressing     Problem: Knowledge Deficit  Goal: Patient/family/caregiver demonstrates understanding of disease process, treatment plan, medications, and discharge instructions  Description: Complete learning assessment and assess knowledge base    Interventions:  - Provide teaching at level of understanding  - Provide teaching via preferred learning methods  Outcome: Progressing     Problem: GASTROINTESTINAL - ADULT  Goal: Minimal or absence of nausea and/or vomiting  Description: INTERVENTIONS:  - Administer IV fluids if ordered to ensure adequate hydration  - Maintain NPO status until nausea and vomiting are resolved  - Nasogastric tube if ordered  - Administer ordered antiemetic medications as needed  - Provide nonpharmacologic comfort measures as appropriate  - Advance diet as tolerated, if ordered  - Consider nutrition services referral to assist patient with adequate nutrition and appropriate food choices  Outcome: Progressing  Goal: Maintains or returns to baseline bowel function  Description: INTERVENTIONS:  - Assess bowel function  - Encourage oral fluids to ensure adequate hydration  - Administer IV fluids if ordered to ensure adequate hydration  - Administer ordered medications as needed  - Encourage mobilization and activity  - Consider nutritional services referral to assist patient with adequate nutrition and appropriate food choices  Outcome: Progressing  Goal: Maintains adequate nutritional intake  Description: INTERVENTIONS:  - Monitor percentage of each meal consumed  - Identify factors contributing to decreased intake, treat as appropriate  - Assist with meals as needed  - Monitor I&O, weight, and lab values if indicated  - Obtain nutrition services referral as needed  Outcome: Progressing  Goal: Establish and maintain optimal ostomy function  Description: INTERVENTIONS:  - Assess bowel function  - Encourage oral fluids to ensure adequate hydration  - Administer IV fluids if ordered to ensure adequate hydration   - Administer ordered medications as needed  - Encourage mobilization and activity  - Nutrition services referral to assist patient with appropriate food choices  - Assess stoma site  - Consider wound care consult   Outcome: Progressing  Goal: Oral mucous membranes remain intact  Description: INTERVENTIONS  - Assess oral mucosa and hygiene practices  - Implement preventative oral hygiene regimen  - Implement oral medicated treatments as ordered  - Initiate Nutrition services referral as needed  Outcome: Progressing     Problem: HEMATOLOGIC - ADULT  Goal: Maintains hematologic stability  Description: INTERVENTIONS  - Assess for signs and symptoms of bleeding or hemorrhage  - Monitor labs  - Administer supportive blood products/factors as ordered and appropriate  Outcome: Progressing     Problem: Nutrition/Hydration-ADULT  Goal: Nutrient/Hydration intake appropriate for improving, restoring or maintaining nutritional needs  Description: Monitor and assess patient's nutrition/hydration status for malnutrition  Collaborate with interdisciplinary team and initiate plan and interventions as ordered  Monitor patient's weight and dietary intake as ordered or per policy  Utilize nutrition screening tool and intervene as necessary  Determine patient's food preferences and provide high-protein, high-caloric foods as appropriate       INTERVENTIONS:  - Monitor oral intake, urinary output, labs, and treatment plans  - Assess nutrition and hydration status and recommend course of action  - Evaluate amount of meals eaten  - Assist patient with eating if necessary   - Allow adequate time for meals  - Recommend/ encourage appropriate diets, oral nutritional supplements, and vitamin/mineral supplements  - Order, calculate, and assess calorie counts as needed  - Recommend, monitor, and adjust tube feedings and TPN/PPN based on assessed needs  - Assess need for intravenous fluids  - Provide specific nutrition/hydration education as appropriate  - Include patient/family/caregiver in decisions related to nutrition  Outcome: Progressing

## 2022-06-27 NOTE — ASSESSMENT & PLAN NOTE
This is a 78-year-old female with history of Savage-en-Y gastric bypass, symptomatic anemia, gastric ulcer, hypothyroidism, depression initially admitted on 06/11/2022 to [facility] for hematemesis and blood per rectum Secondary to marginal/anastomotic ulcer status post EGD x2  · Status post total 6 unit PRBCs  · Hemoglobin level appears to be stable  · Transfuse further as needed to maintain greater than 7  · Appreciate GI, bariatric recommendations  · Patient is cleared for discharge, case management working on placement

## 2022-06-27 NOTE — PLAN OF CARE
Problem: Prexisting or High Potential for Compromised Skin Integrity  Goal: Skin integrity is maintained or improved  Description: INTERVENTIONS:  - Identify patients at risk for skin breakdown  - Assess and monitor skin integrity  - Assess and monitor nutrition and hydration status  - Monitor labs   - Assess for incontinence   - Turn and reposition patient  - Assist with mobility/ambulation  - Relieve pressure over bony prominences  - Avoid friction and shearing  - Provide appropriate hygiene as needed including keeping skin clean and dry  - Evaluate need for skin moisturizer/barrier cream  - Collaborate with interdisciplinary team   - Patient/family teaching  - Consider wound care consult   Outcome: Progressing     Problem: MOBILITY - ADULT  Goal: Maintain or return to baseline ADL function  Description: INTERVENTIONS:  -  Assess patient's ability to carry out ADLs; assess patient's baseline for ADL function and identify physical deficits which impact ability to perform ADLs (bathing, care of mouth/teeth, toileting, grooming, dressing, etc )  - Assess/evaluate cause of self-care deficits   - Assess range of motion  - Assess patient's mobility; develop plan if impaired  - Assess patient's need for assistive devices and provide as appropriate  - Encourage maximum independence but intervene and supervise when necessary  - Involve family in performance of ADLs  - Assess for home care needs following discharge   - Consider OT consult to assist with ADL evaluation and planning for discharge  - Provide patient education as appropriate  Outcome: Progressing  Goal: Maintains/Returns to pre admission functional level  Description: INTERVENTIONS:  - Perform BMAT or MOVE assessment daily    - Set and communicate daily mobility goal to care team and patient/family/caregiver  - Collaborate with rehabilitation services on mobility goals if consulted  - Perform Range of Motion 3 times a day    - Reposition patient every 2 hours   - Dangle patient 3 times a day  - Stand patient 3 times a day  - Out of bed for toileting  - Record patient progress and toleration of activity level   Outcome: Progressing     Problem: Potential for Falls  Goal: Patient will remain free of falls  Description: INTERVENTIONS:  - Educate patient/family on patient safety including physical limitations  - Instruct patient to call for assistance with activity   - Consult OT/PT to assist with strengthening/mobility   - Keep Call bell within reach  - Keep bed low and locked with side rails adjusted as appropriate  - Keep care items and personal belongings within reach  - Initiate and maintain comfort rounds  - Make Fall Risk Sign visible to staff  - Offer Toileting every 2 Hours, in advance of need  - Initiate/Maintain bed alarm  - Obtain necessary fall risk management equipment  - Apply yellow socks and bracelet for high fall risk patients  - Consider moving patient to room near nurses station  Outcome: Progressing     Problem: PAIN - ADULT  Goal: Verbalizes/displays adequate comfort level or baseline comfort level  Description: Interventions:  - Encourage patient to monitor pain and request assistance  - Assess pain using appropriate pain scale  - Administer analgesics based on type and severity of pain and evaluate response  - Implement non-pharmacological measures as appropriate and evaluate response  - Consider cultural and social influences on pain and pain management  - Notify physician/advanced practitioner if interventions unsuccessful or patient reports new pain  Outcome: Progressing     Problem: INFECTION - ADULT  Goal: Absence or prevention of progression during hospitalization  Description: INTERVENTIONS:  - Assess and monitor for signs and symptoms of infection  - Monitor lab/diagnostic results  - Monitor all insertion sites, i e  indwelling lines, tubes, and drains  - Monitor endotracheal if appropriate and nasal secretions for changes in amount and color  - [place] appropriate cooling/warming therapies per order  - Administer medications as ordered  - Instruct and encourage patient and family to use good hand hygiene technique  - Identify and instruct in appropriate isolation precautions for identified infection/condition  Outcome: Progressing  Goal: Absence of fever/infection during neutropenic period  Description: INTERVENTIONS:  - Monitor WBC    Outcome: Progressing     Problem: SAFETY ADULT  Goal: Maintain or return to baseline ADL function  Description: INTERVENTIONS:  -  Assess patient's ability to carry out ADLs; assess patient's baseline for ADL function and identify physical deficits which impact ability to perform ADLs (bathing, care of mouth/teeth, toileting, grooming, dressing, etc )  - Assess/evaluate cause of self-care deficits   - Assess range of motion  - Assess patient's mobility; develop plan if impaired  - Assess patient's need for assistive devices and provide as appropriate  - Encourage maximum independence but intervene and supervise when necessary  - Involve family in performance of ADLs  - Assess for home care needs following discharge   - Consider OT consult to assist with ADL evaluation and planning for discharge  - Provide patient education as appropriate  Outcome: Progressing  Goal: Maintains/Returns to pre admission functional level  Description: INTERVENTIONS:  - Perform BMAT or MOVE assessment daily    - Set and communicate daily mobility goal to care team and patient/family/caregiver  - Collaborate with rehabilitation services on mobility goals if consulted  - Perform Range of Motion 3 times a day  - Reposition patient every 2 hours    - Dangle patient 3 times a day  - Stand patient 3 times a day  - Out of bed for toileting  - Record patient progress and toleration of activity level   Outcome: Progressing  Goal: Patient will remain free of falls  Description: INTERVENTIONS:  - Educate patient/family on patient safety including physical limitations  - Instruct patient to call for assistance with activity   - Consult OT/PT to assist with strengthening/mobility   - Keep Call bell within reach  - Keep bed low and locked with side rails adjusted as appropriate  - Keep care items and personal belongings within reach  - Initiate and maintain comfort rounds  - Make Fall Risk Sign visible to staff  - Offer Toileting every 2 Hours, in advance of need  - Initiate/Maintain bed alarm  - Obtain necessary fall risk management equipment  - Apply yellow socks and bracelet for high fall risk patients  - Consider moving patient to room near nurses station  Outcome: Progressing     Problem: DISCHARGE PLANNING  Goal: Discharge to home or other facility with appropriate resources  Description: INTERVENTIONS:  - Identify barriers to discharge w/patient and caregiver  - Arrange for needed discharge resources and transportation as appropriate  - Identify discharge learning needs (meds, wound care, etc )  - Arrange for interpretive services to assist at discharge as needed  - Refer to Case Management Department for coordinating discharge planning if the patient needs post-hospital services based on physician/advanced practitioner order or complex needs related to functional status, cognitive ability, or social support system  Outcome: Progressing     Problem: Knowledge Deficit  Goal: Patient/family/caregiver demonstrates understanding of disease process, treatment plan, medications, and discharge instructions  Description: Complete learning assessment and assess knowledge base    Interventions:  - Provide teaching at level of understanding  - Provide teaching via preferred learning methods  Outcome: Progressing     Problem: GASTROINTESTINAL - ADULT  Goal: Minimal or absence of nausea and/or vomiting  Description: INTERVENTIONS:  - Administer IV fluids if ordered to ensure adequate hydration  - Maintain NPO status until nausea and vomiting are resolved  - Nasogastric tube if ordered  - Administer ordered antiemetic medications as needed  - Provide nonpharmacologic comfort measures as appropriate  - Advance diet as tolerated, if ordered  - Consider nutrition services referral to assist patient with adequate nutrition and appropriate food choices  Outcome: Progressing  Goal: Maintains or returns to baseline bowel function  Description: INTERVENTIONS:  - Assess bowel function  - Encourage oral fluids to ensure adequate hydration  - Administer IV fluids if ordered to ensure adequate hydration  - Administer ordered medications as needed  - Encourage mobilization and activity  - Consider nutritional services referral to assist patient with adequate nutrition and appropriate food choices  Outcome: Progressing  Goal: Maintains adequate nutritional intake  Description: INTERVENTIONS:  - Monitor percentage of each meal consumed  - Identify factors contributing to decreased intake, treat as appropriate  - Assist with meals as needed  - Monitor I&O, weight, and lab values if indicated  - Obtain nutrition services referral as needed  Outcome: Progressing  Goal: Establish and maintain optimal ostomy function  Description: INTERVENTIONS:  - Assess bowel function  - Encourage oral fluids to ensure adequate hydration  - Administer IV fluids if ordered to ensure adequate hydration   - Administer ordered medications as needed  - Encourage mobilization and activity  - Nutrition services referral to assist patient with appropriate food choices  - Assess stoma site  - Consider wound care consult   Outcome: Progressing  Goal: Oral mucous membranes remain intact  Description: INTERVENTIONS  - Assess oral mucosa and hygiene practices  - Implement preventative oral hygiene regimen  - Implement oral medicated treatments as ordered  - Initiate Nutrition services referral as needed  Outcome: Progressing     Problem: Nutrition/Hydration-ADULT  Goal: Nutrient/Hydration intake appropriate for improving, restoring or maintaining nutritional needs  Description: Monitor and assess patient's nutrition/hydration status for malnutrition  Collaborate with interdisciplinary team and initiate plan and interventions as ordered  Monitor patient's weight and dietary intake as ordered or per policy  Utilize nutrition screening tool and intervene as necessary  Determine patient's food preferences and provide high-protein, high-caloric foods as appropriate       INTERVENTIONS:  - Monitor oral intake, urinary output, labs, and treatment plans  - Assess nutrition and hydration status and recommend course of action  - Evaluate amount of meals eaten  - Assist patient with eating if necessary   - Allow adequate time for meals  - Recommend/ encourage appropriate diets, oral nutritional supplements, and vitamin/mineral supplements  - Order, calculate, and assess calorie counts as needed  - Recommend, monitor, and adjust tube feedings and TPN/PPN based on assessed needs  - Assess need for intravenous fluids  - Provide specific nutrition/hydration education as appropriate  - Include patient/family/caregiver in decisions related to nutrition  Outcome: Progressing     Problem: HEMATOLOGIC - ADULT  Goal: Maintains hematologic stability  Description: INTERVENTIONS  - Assess for signs and symptoms of bleeding or hemorrhage  - Monitor labs  - Administer supportive blood products/factors as ordered and appropriate  Outcome: Progressing

## 2022-06-28 VITALS
OXYGEN SATURATION: 99 % | SYSTOLIC BLOOD PRESSURE: 104 MMHG | BODY MASS INDEX: 19.35 KG/M2 | WEIGHT: 113.32 LBS | TEMPERATURE: 98.1 F | HEART RATE: 82 BPM | HEIGHT: 64 IN | DIASTOLIC BLOOD PRESSURE: 52 MMHG | RESPIRATION RATE: 16 BRPM

## 2022-06-28 LAB
ANION GAP SERPL CALCULATED.3IONS-SCNC: 5 MMOL/L (ref 4–13)
BASOPHILS # BLD AUTO: 0.03 THOUSANDS/ΜL (ref 0–0.1)
BASOPHILS NFR BLD AUTO: 0 % (ref 0–1)
BUN SERPL-MCNC: 18 MG/DL (ref 5–25)
CALCIUM SERPL-MCNC: 7.5 MG/DL (ref 8.3–10.1)
CHLORIDE SERPL-SCNC: 106 MMOL/L (ref 100–108)
CO2 SERPL-SCNC: 23 MMOL/L (ref 21–32)
CREAT SERPL-MCNC: 0.86 MG/DL (ref 0.6–1.3)
EOSINOPHIL # BLD AUTO: 0.71 THOUSAND/ΜL (ref 0–0.61)
EOSINOPHIL NFR BLD AUTO: 11 % (ref 0–6)
ERYTHROCYTE [DISTWIDTH] IN BLOOD BY AUTOMATED COUNT: 20.4 % (ref 11.6–15.1)
GFR SERPL CREATININE-BSD FRML MDRD: 64 ML/MIN/1.73SQ M
GLUCOSE SERPL-MCNC: 84 MG/DL (ref 65–140)
HCT VFR BLD AUTO: 24.2 % (ref 34.8–46.1)
HGB BLD-MCNC: 7.5 G/DL (ref 11.5–15.4)
IMM GRANULOCYTES # BLD AUTO: 0.07 THOUSAND/UL (ref 0–0.2)
IMM GRANULOCYTES NFR BLD AUTO: 1 % (ref 0–2)
LYMPHOCYTES # BLD AUTO: 2.05 THOUSANDS/ΜL (ref 0.6–4.47)
LYMPHOCYTES NFR BLD AUTO: 31 % (ref 14–44)
MAGNESIUM SERPL-MCNC: 2.2 MG/DL (ref 1.6–2.6)
MCH RBC QN AUTO: 29 PG (ref 26.8–34.3)
MCHC RBC AUTO-ENTMCNC: 31 G/DL (ref 31.4–37.4)
MCV RBC AUTO: 93 FL (ref 82–98)
MONOCYTES # BLD AUTO: 0.91 THOUSAND/ΜL (ref 0.17–1.22)
MONOCYTES NFR BLD AUTO: 14 % (ref 4–12)
NEUTROPHILS # BLD AUTO: 2.96 THOUSANDS/ΜL (ref 1.85–7.62)
NEUTS SEG NFR BLD AUTO: 43 % (ref 43–75)
NRBC BLD AUTO-RTO: 0 /100 WBCS
PHOSPHATE SERPL-MCNC: 3.2 MG/DL (ref 2.3–4.1)
PLATELET # BLD AUTO: 367 THOUSANDS/UL (ref 149–390)
PMV BLD AUTO: 10.1 FL (ref 8.9–12.7)
POTASSIUM SERPL-SCNC: 4.6 MMOL/L (ref 3.5–5.3)
RBC # BLD AUTO: 2.59 MILLION/UL (ref 3.81–5.12)
SODIUM SERPL-SCNC: 134 MMOL/L (ref 136–145)
WBC # BLD AUTO: 6.73 THOUSAND/UL (ref 4.31–10.16)

## 2022-06-28 PROCEDURE — 85025 COMPLETE CBC W/AUTO DIFF WBC: CPT | Performed by: INTERNAL MEDICINE

## 2022-06-28 PROCEDURE — 99239 HOSP IP/OBS DSCHRG MGMT >30: CPT | Performed by: INTERNAL MEDICINE

## 2022-06-28 PROCEDURE — 84100 ASSAY OF PHOSPHORUS: CPT | Performed by: INTERNAL MEDICINE

## 2022-06-28 PROCEDURE — 80048 BASIC METABOLIC PNL TOTAL CA: CPT | Performed by: INTERNAL MEDICINE

## 2022-06-28 PROCEDURE — 83735 ASSAY OF MAGNESIUM: CPT | Performed by: INTERNAL MEDICINE

## 2022-06-28 RX ORDER — THIAMINE MONONITRATE (VIT B1) 100 MG
100 TABLET ORAL DAILY
Refills: 0
Start: 2022-06-29

## 2022-06-28 RX ORDER — MISOPROSTOL 200 UG/1
200 TABLET ORAL
Refills: 0
Start: 2022-06-28 | End: 2022-08-09

## 2022-06-28 RX ORDER — POTASSIUM CHLORIDE 20 MEQ/1
40 TABLET, EXTENDED RELEASE ORAL 2 TIMES DAILY
Refills: 0
Start: 2022-06-28 | End: 2022-08-09

## 2022-06-28 RX ORDER — FOLIC ACID 1 MG/1
1 TABLET ORAL DAILY
Refills: 0
Start: 2022-06-29

## 2022-06-28 RX ADMIN — PANTOPRAZOLE SODIUM 40 MG: 40 TABLET, DELAYED RELEASE ORAL at 05:33

## 2022-06-28 RX ADMIN — THIAMINE HCL TAB 100 MG 100 MG: 100 TAB at 08:46

## 2022-06-28 RX ADMIN — FOLIC ACID 1 MG: 1 TABLET ORAL at 08:47

## 2022-06-28 RX ADMIN — POTASSIUM CHLORIDE 40 MEQ: 20 TABLET, EXTENDED RELEASE ORAL at 08:46

## 2022-06-28 RX ADMIN — SUCRALFATE 1 G: 1 TABLET ORAL at 11:44

## 2022-06-28 RX ADMIN — SUCRALFATE 1 G: 1 TABLET ORAL at 05:33

## 2022-06-28 RX ADMIN — LEVOTHYROXINE SODIUM 112 MCG: 112 TABLET ORAL at 05:32

## 2022-06-28 RX ADMIN — MISOPROSTOL 200 MCG: 200 TABLET ORAL at 11:43

## 2022-06-28 RX ADMIN — MISOPROSTOL 200 MCG: 200 TABLET ORAL at 08:46

## 2022-06-28 NOTE — PLAN OF CARE
Problem: Prexisting or High Potential for Compromised Skin Integrity  Goal: Skin integrity is maintained or improved  Description: INTERVENTIONS:  - Identify patients at risk for skin breakdown  - Assess and monitor skin integrity  - Assess and monitor nutrition and hydration status  - Monitor labs   - Assess for incontinence   - Turn and reposition patient  - Assist with mobility/ambulation  - Relieve pressure over bony prominences  - Avoid friction and shearing  - Provide appropriate hygiene as needed including keeping skin clean and dry  - Evaluate need for skin moisturizer/barrier cream  - Collaborate with interdisciplinary team   - Patient/family teaching  - Consider wound care consult   Outcome: Progressing     Problem: MOBILITY - ADULT  Goal: Maintain or return to baseline ADL function  Description: INTERVENTIONS:  -  Assess patient's ability to carry out ADLs; assess patient's baseline for ADL function and identify physical deficits which impact ability to perform ADLs (bathing, care of mouth/teeth, toileting, grooming, dressing, etc )  - Assess/evaluate cause of self-care deficits   - Assess range of motion  - Assess patient's mobility; develop plan if impaired  - Assess patient's need for assistive devices and provide as appropriate  - Encourage maximum independence but intervene and supervise when necessary  - Involve family in performance of ADLs  - Assess for home care needs following discharge   - Consider OT consult to assist with ADL evaluation and planning for discharge  - Provide patient education as appropriate  Outcome: Progressing  Goal: Maintains/Returns to pre admission functional level  Description: INTERVENTIONS:  - Perform BMAT or MOVE assessment daily    - Set and communicate daily mobility goal to care team and patient/family/caregiver  - Collaborate with rehabilitation services on mobility goals if consulted  - Perform Range of Motion 3 times a day    - Reposition patient every 2 hours   - Dangle patient 3 times a day  - Stand patient 3 times a day  - Out of bed for toileting  - Record patient progress and toleration of activity level   Outcome: Progressing     Problem: Potential for Falls  Goal: Patient will remain free of falls  Description: INTERVENTIONS:  - Educate patient/family on patient safety including physical limitations  - Instruct patient to call for assistance with activity   - Consult OT/PT to assist with strengthening/mobility   - Keep Call bell within reach  - Keep bed low and locked with side rails adjusted as appropriate  - Keep care items and personal belongings within reach  - Initiate and maintain comfort rounds  - Make Fall Risk Sign visible to staff  - Offer Toileting every 2 Hours, in advance of need  - Initiate/Maintain bed alarm  - Obtain necessary fall risk management equipment  - Apply yellow socks and bracelet for high fall risk patients  - Consider moving patient to room near nurses station  Outcome: Progressing     Problem: PAIN - ADULT  Goal: Verbalizes/displays adequate comfort level or baseline comfort level  Description: Interventions:  - Encourage patient to monitor pain and request assistance  - Assess pain using appropriate pain scale  - Administer analgesics based on type and severity of pain and evaluate response  - Implement non-pharmacological measures as appropriate and evaluate response  - Consider cultural and social influences on pain and pain management  - Notify physician/advanced practitioner if interventions unsuccessful or patient reports new pain  Outcome: Progressing     Problem: INFECTION - ADULT  Goal: Absence or prevention of progression during hospitalization  Description: INTERVENTIONS:  - Assess and monitor for signs and symptoms of infection  - Monitor lab/diagnostic results  - Monitor all insertion sites, i e  indwelling lines, tubes, and drains  - Monitor endotracheal if appropriate and nasal secretions for changes in amount and color  - [place] appropriate cooling/warming therapies per order  - Administer medications as ordered  - Instruct and encourage patient and family to use good hand hygiene technique  - Identify and instruct in appropriate isolation precautions for identified infection/condition  Outcome: Progressing  Goal: Absence of fever/infection during neutropenic period  Description: INTERVENTIONS:  - Monitor WBC    Outcome: Progressing     Problem: Nutrition/Hydration-ADULT  Goal: Nutrient/Hydration intake appropriate for improving, restoring or maintaining nutritional needs  Description: Monitor and assess patient's nutrition/hydration status for malnutrition  Collaborate with interdisciplinary team and initiate plan and interventions as ordered  Monitor patient's weight and dietary intake as ordered or per policy  Utilize nutrition screening tool and intervene as necessary  Determine patient's food preferences and provide high-protein, high-caloric foods as appropriate       INTERVENTIONS:  - Monitor oral intake, urinary output, labs, and treatment plans  - Assess nutrition and hydration status and recommend course of action  - Evaluate amount of meals eaten  - Assist patient with eating if necessary   - Allow adequate time for meals  - Recommend/ encourage appropriate diets, oral nutritional supplements, and vitamin/mineral supplements  - Order, calculate, and assess calorie counts as needed  - Recommend, monitor, and adjust tube feedings and TPN/PPN based on assessed needs  - Assess need for intravenous fluids  - Provide specific nutrition/hydration education as appropriate  - Include patient/family/caregiver in decisions related to nutrition  Outcome: Progressing     Problem: GASTROINTESTINAL - ADULT  Goal: Minimal or absence of nausea and/or vomiting  Description: INTERVENTIONS:  - Administer IV fluids if ordered to ensure adequate hydration  - Maintain NPO status until nausea and vomiting are resolved  - Nasogastric tube if ordered  - Administer ordered antiemetic medications as needed  - Provide nonpharmacologic comfort measures as appropriate  - Advance diet as tolerated, if ordered  - Consider nutrition services referral to assist patient with adequate nutrition and appropriate food choices  Outcome: Progressing  Goal: Maintains or returns to baseline bowel function  Description: INTERVENTIONS:  - Assess bowel function  - Encourage oral fluids to ensure adequate hydration  - Administer IV fluids if ordered to ensure adequate hydration  - Administer ordered medications as needed  - Encourage mobilization and activity  - Consider nutritional services referral to assist patient with adequate nutrition and appropriate food choices  Outcome: Progressing  Goal: Maintains adequate nutritional intake  Description: INTERVENTIONS:  - Monitor percentage of each meal consumed  - Identify factors contributing to decreased intake, treat as appropriate  - Assist with meals as needed  - Monitor I&O, weight, and lab values if indicated  - Obtain nutrition services referral as needed  Outcome: Progressing  Goal: Establish and maintain optimal ostomy function  Description: INTERVENTIONS:  - Assess bowel function  - Encourage oral fluids to ensure adequate hydration  - Administer IV fluids if ordered to ensure adequate hydration   - Administer ordered medications as needed  - Encourage mobilization and activity  - Nutrition services referral to assist patient with appropriate food choices  - Assess stoma site  - Consider wound care consult   Outcome: Progressing  Goal: Oral mucous membranes remain intact  Description: INTERVENTIONS  - Assess oral mucosa and hygiene practices  - Implement preventative oral hygiene regimen  - Implement oral medicated treatments as ordered  - Initiate Nutrition services referral as needed  Outcome: Progressing

## 2022-06-28 NOTE — ASSESSMENT & PLAN NOTE
Likely related to poor oral intake  Currently on TPN managed by bariatric service  Sodium level stable    Routine lab monitoring as outpatient

## 2022-06-28 NOTE — CASE MANAGEMENT
Case Management Discharge Planning Note    Patient name Radha Lozano  [address] MRN [id number]  : 1945 Date 2022       Current Admission Date: 2022  Current Admission Diagnosis:Acute blood loss anemia   Patient Active Problem List    Diagnosis Date Noted    Severe protein-calorie malnutrition ([address] ) 2022    Bilateral leg edema 2022    Ambulatory dysfunction 2022    Acute blood loss anemia 2022    Gastric ulcer 2022    Fever 2022    Anemia 2022    Dyspnea on exertion 2022    Generalized weakness 2022    Hyponatremia 2022    Abnormal CT scan 2022    H/O bariatric surgery - bypass 2022    Cerumen debris on tympanic membrane of right ear 2022    Nasal congestion 2022    Bilateral hearing loss 2022    Fibromyalgia syndrome 2021    Osteopenia of both forearms 2021    Medicare annual wellness visit, subsequent 2021    Shortness of breath 2021    Acute bronchitis 2021    COVID-19 2021    Dysphasia 2020    Epigastric pain 2020    Hypothyroidism 2020    Gastroesophageal reflux disease without esophagitis 2020    Depression 2020    Fibromyalgia, primary 2020    Iron deficiency 2020    Numbness of foot 2020      LOS (days): 12  Geometric Mean LOS (GMLOS) (days): 4 40  Days to GMLOS:-7 1     OBJECTIVE:  Risk of Unplanned Readmission Score: 19 22         Current admission status: Inpatient   Preferred Pharmacy:   [facility] [address] [address]  Phone: [phone] Fax: [phone]    Primary Care Provider: Vincenzo Cowden, MD    Primary Insurance: [facility]  Secondary Insurance:     DISCHARGE DETAILS:    Discharge planning discussed with[de-identified] Sister & patient  Freedom of Choice: Yes  Comments - Freedom of Choice: Pt choosing to go to [facility] for [employer]  CM contacted family/caregiver?: Yes  Were Treatment Team discharge recommendations reviewed with patient/caregiver?: Yes  Did patient/caregiver verbalize understanding of patient care needs?: N/A- going to facility  Were patient/caregiver advised of the risks associated with not following Treatment Team discharge recommendations?: Yes    Contacts  Patient Contacts: Aranza Ma  Relationship to Patient[de-identified] Family  Contact Method: Phone  Phone Number: [phone]  Reason/Outcome: Continuity of Care, Discharge Planning, Emergency Contact                        Treatment Team Recommendation: Short Term Rehab  Discharge Destination Plan[de-identified] Short Term Rehab  Transport at Discharge : [facility]     Number/Name of Dispatcher: [employer]  Transported by [employer] and Unit #): [facility] @ 1430  ETA of Transport (Date): 06/28/22  ETA of Transport (Time): 1430              IMM Given (Date):: 06/28/22  IMM Given to[de-identified] Patient     Additional Comments: CM met w/ pt at bedside to go over d/c planning & Medicare rights  Pt verbally agreeable & signed her IMM understanding her d/c to [facility]  Pt also called sister to let her know pt was getting picked up at 230 this afternoon to go to [facility]  Sister agreeable & thankful for the call  Pt will be picked up BLS via [facility] at 230  CM to continue to follow pt care & d/c planning

## 2022-06-28 NOTE — ASSESSMENT & PLAN NOTE
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

## 2022-06-28 NOTE — ASSESSMENT & PLAN NOTE
This is a 66-year-old female with history of Savage-en-Y gastric bypass, symptomatic anemia, gastric ulcer, hypothyroidism, depression initially admitted on 06/11/2022 to [facility] for hematemesis and blood per rectum Secondary to marginal/anastomotic ulcer status post EGD x2  · Status post total 6 unit PRBCs  · Hemoglobin level appears to be stable  · Appreciate GI, bariatric recommendations  · Patient is cleared for discharge, case management has arranged for discharge to rehab

## 2022-06-28 NOTE — ASSESSMENT & PLAN NOTE
· Continue Klonopin  · Seen by neuropsychiatry 6/13 deemed patient not to have capacity to make decisions  · Radha Reyes is POA

## 2022-06-28 NOTE — DISCHARGE SUMMARY
[address] [facility]  Discharge- Grey Azaruse 1945, 64 y o  female MRN: [id number]  Unit/Bed#: E5 -01 Encounter: [id number]  Primary Care Provider: Davina Torres MD   Date and time admitted to hospital: 6/16/2022  9:16 PM    * Acute blood loss anemia  Assessment & Plan  This is a 75-year-old female with history of Savage-en-Y gastric bypass, symptomatic anemia, gastric ulcer, hypothyroidism, depression initially admitted on 06/11/2022 to [facility] for hematemesis and blood per rectum Secondary to marginal/anastomotic ulcer status post EGD x2  · Status post total 6 unit PRBCs  · Hemoglobin level appears to be stable  · Appreciate GI, bariatric recommendations  · Patient is cleared for discharge, case management has arranged for discharge to rehab    Gastric ulcer  Assessment & Plan  · EGD 6/12 revealed single large deep irregular benign appearing ulcer gastrojejunal anastomoses with adherent clot status post 2 clips, injected epinephrine and hemostasis achieved  · Emergent EGD 06/15/2022 revealed single large deep ulcer in gastrojejunal anastomosis with nonbleeding visible vessel, induced coagulation and hemostasis achieved with cautery and epinephrine injection  · Bariatric surgery follow-up appreciated  · Full bariatric diet  · Continue Protonix b i d , Carafate, Cytotec  · PICC line placed, initiated on TPN  Management per bariatrics   · Plan for nutritional optimization and potential outpatient revision  Plan for repeat endoscopy 6 weeks after discharge      Bilateral leg edema  Assessment & Plan  · Lower extremity Doppler negative for DVT  · Likely due to low albumin and 3rd spacing  · Elevate when resting  · Improving  · Compression stockings on discharge     Severe protein-calorie malnutrition (HCC)  Assessment & Plan  Malnutrition Findings:   Adult Malnutrition type: Chronic illness  BMI Findings: Body mass index is 19 45 kg/m²       TPN initiated  Full bariatric diet    Ambulatory dysfunction  Assessment & Plan  Appreciate PT/OT recommendations  Patient will be discharged to rehab    H/O bariatric surgery - bypass  Assessment & Plan  · 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 at [facility]  · Outpatient bariatric follow-up    Hyponatremia  Assessment & Plan  Likely related to poor oral intake  Currently on TPN managed by bariatric service  Sodium level stable  Routine lab monitoring as outpatient    Depression  Assessment & Plan  · Continue Klonopin  · Seen by neuropsychiatry 6/13 deemed patient not to have capacity to make decisions  · Brother Angelyn Snellen is POA    Hypothyroidism  Assessment & Plan  · Continue Synthroid      Discharging Physician / Practitioner: Sebastian Cano MD  PCP: Jordon Hall MD  Admission Date:   Admission Orders (From admission, onward)     Ordered        06/16/22 2122  Inpatient Admission  Once                      Discharge Date: 06/28/22    Medical Problems             Resolved Problems  Date Reviewed: 6/26/2022          Resolved    Neutrophilic leukocytosis 4/91/9167     Resolved by  Sol Armstrong DO    Hypocalcemia 6/26/2022     Resolved by  Sol Armstrong DO                Consultations During Hospital Stay:  · Surgery, bariatric, neuropsych, Geriatrics, GI    Procedures Performed:   · EGD    Significant Findings / Test Results:   · Gastric ulcer    Incidental Findings:   · None     Test Results Pending at Discharge (will require follow up): · None     Outpatient Tests Requested:  · Routine labs with PCP as outpatient    Complications:     None    Reason for Admission:  GI bleed    Hospital Course:     Terry Phelps is a 64 y o  female patient who originally presented to the hospital on 6/16/2022 due to GI bleed  Please see H&P for details regarding initial admission information  Patient was monitored in the ICU initially and GI/Bariatric Service were consulted    Patient received total of 6 units PRBCs during hospitalization  Patient was initially NPO and eventually started on clear liquid diet  Patient was maintained on PPI drip  Patient had PICC line placed for TPN to help with nutrition needs per bariatric recommendation  PICC line was placed and patient was started on TPN  Patient's hemoglobin level remained stable  Patient was cleared by bariatric surgery to be discharged  Electrolytes were replaced as needed  Patient stable for discharge  Please see above list of diagnoses and related plan for additional information  Condition at Discharge: stable     Discharge Day Visit / Exam:     Subjective:  Patient is tolerating  Trach diet  Denies any pain complaints  Vitals: Blood Pressure: 104/52 (06/28/22 0746)  Pulse: 82 (06/28/22 0746)  Temperature: 98 1 °F (36 7 °C) (06/28/22 0746)  Temp Source: Oral (06/19/22 0644)  Respirations: 16 (06/27/22 2225)  Height: 5' 4" (162 6 cm) (06/16/22 2130)  Weight - Scale: 51 4 kg (113 lb 5 1 oz) (06/28/22 0600)  SpO2: 99 % (06/28/22 0746)     Exam:   Physical Exam  Constitutional:       General: She is not in acute distress  Comments: Frail  female   HENT:      Head: Normocephalic and atraumatic  Nose: Nose normal       Mouth/Throat:      Mouth: Mucous membranes are moist    Eyes:      Extraocular Movements: Extraocular movements intact  Conjunctiva/sclera: Conjunctivae normal    Cardiovascular:      Rate and Rhythm: Normal rate  Pulmonary:      Effort: Pulmonary effort is normal  No respiratory distress  Abdominal:      Palpations: Abdomen is soft  Tenderness: There is no abdominal tenderness  Musculoskeletal:         General: Normal range of motion  Cervical back: Normal range of motion and neck supple  Comments: Generalized weakness   Skin:     General: Skin is warm and dry  Neurological:      General: No focal deficit present  Mental Status: She is alert  Mental status is at baseline        Cranial Nerves: No cranial nerve deficit  Psychiatric:         Mood and Affect: Mood normal          Behavior: Behavior normal          Discussion with Family:  Spoke with patient's sister over the phone yesterday regarding plan of care  Attempted to call patient's sister again today with no answer at number provided in chart    Discharge instructions/Information to patient and family:   See after visit summary for information provided to patient and family  Provisions for Follow-Up Care:  See after visit summary for information related to follow-up care and any pertinent home health orders  Disposition:     [facility]    Planned Readmission:    no     Discharge Statement:  I spent 35 minutes discharging the patient  This time was spent on the day of discharge  I had direct contact with the patient on the day of discharge  Greater than 50% of the total time was spent examining patient, answering all patient questions, arranging and discussing plan of care with patient as well as directly providing post-discharge instructions  Additional time then spent on discharge activities  Discharge Medications:  See after visit summary for reconciled discharge medications provided to patient and family        ** Please Note: This note has been constructed using a voice recognition system **

## 2022-06-29 ENCOUNTER — TELEPHONE (OUTPATIENT)
Dept: OTHER | Facility: OTHER | Age: 77
End: 2022-06-29

## 2022-06-29 NOTE — TELEPHONE ENCOUNTER
Patient states she received a letter in the mail stating she has biopsy results awaiting her  She seems to think this is a mistake  Please call

## 2022-06-29 NOTE — TELEPHONE ENCOUNTER
Spoke with pt, received a recall letter for edg from hospitalization at [facility], prefers to follow with her [address] [facility]

## 2022-07-13 ENCOUNTER — PREP FOR PROCEDURE (OUTPATIENT)
Dept: BARIATRICS | Facility: CLINIC | Age: 77
End: 2022-07-13

## 2022-07-13 ENCOUNTER — OFFICE VISIT (OUTPATIENT)
Dept: BARIATRICS | Facility: CLINIC | Age: 77
End: 2022-07-13
Payer: COMMERCIAL

## 2022-07-13 VITALS
HEART RATE: 68 BPM | TEMPERATURE: 98.1 F | HEIGHT: 64 IN | BODY MASS INDEX: 18.86 KG/M2 | SYSTOLIC BLOOD PRESSURE: 102 MMHG | DIASTOLIC BLOOD PRESSURE: 54 MMHG | WEIGHT: 110.5 LBS

## 2022-07-13 DIAGNOSIS — K28.9 MARGINAL ULCER: Primary | ICD-10-CM

## 2022-07-13 DIAGNOSIS — K21.9 GASTROESOPHAGEAL REFLUX DISEASE WITHOUT ESOPHAGITIS: Primary | ICD-10-CM

## 2022-07-13 PROCEDURE — 99213 OFFICE O/P EST LOW 20 MIN: CPT | Performed by: SURGERY

## 2022-07-13 NOTE — PROGRESS NOTES
Subjective:      64 y o  female with hx of RNYGB at [facility] in  with hx of known marginal ulcers who was discharged from [address] [facility] after endoscopic intervention x2 for bleeding marginal ulcer  Now presenting for follow up  Patient has had abdominal pain in the epigastrium  She denies any bloody stools  She has been at a rehab facility since discharge  She is on TPN for nutritional support  She is on PPI, cytotec, and carafate      Historical Information   Past Medical History:   Diagnosis Date    Anemia     Anxiety     Bipolar disorder ([address] )     Colon polyp     Disease of thyroid gland     Essential hypertension     Essential tremor     Fibromyalgia, primary     GERD (gastroesophageal reflux disease)     Inflammatory polyarthropathy (HCC)     Mammogram abnormal      Past Surgical History:   Procedure Laterality Date    BREAST BIOPSY Right 2015    benign    CARPAL TUNNEL RELEASE Bilateral     COLONOSCOPY      EGD AND COLONOSCOPY  2014    GASTRIC BYPASS  2012    MAMMO NEEDLE LOCALIZATION RIGHT (ALL INC) Right 2012    MAMMO NEEDLE LOCALIZATION RIGHT (ALL INC) Right 2012    ULNAR NERVE TRANSPOSITION Right      Social History   Social History     Substance and Sexual Activity   Alcohol Use Yes    Alcohol/week: 4 0 standard drinks    Types: 4 Glasses of wine per week    Comment: rare     Social History     Substance and Sexual Activity   Drug Use Never     Social History     Tobacco Use   Smoking Status Former Smoker    Quit date:     Years since quittin 5   Smokeless Tobacco Never Used     Family History:   Family History   Problem Relation Age of Onset    No Known Problems Mother     No Known Problems Father     No Known Problems Sister     No Known Problems Maternal Grandmother     No Known Problems Maternal Grandfather     No Known Problems Paternal Grandmother     No Known Problems Paternal Grandfather     No Known Problems Sister     No Known Problems Sister     Stomach cancer Maternal Aunt     No Known Problems Maternal Aunt     No Known Problems Maternal Aunt     No Known Problems Maternal Aunt     No Known Problems Paternal Aunt     Leukemia Other        Meds/Allergies   all medications and allergies reviewed  No Known Allergies    Objective   Review of Systems   Constitutional: Negative  HENT: Negative  Eyes: Negative  Respiratory: Negative  Cardiovascular: Negative  Gastrointestinal: Negative  Endocrine: Negative  Genitourinary: Negative  Musculoskeletal: Negative  Skin: Negative  Allergic/Immunologic: Negative  Neurological: Negative  Hematological: Negative  Psychiatric/Behavioral: Negative  All other systems reviewed and are negative  Objective:    /54 (BP Location: Left arm, Patient Position: Lying right side, Cuff Size: Child)   Pulse 68   Temp 98 1 °F (36 7 °C) (Tympanic)   Ht 5' 4" (1 626 m)   Wt 50 1 kg (110 lb 8 oz)   BMI 18 97 kg/m²       Physical Exam  Vitals and nursing note reviewed  Constitutional:       Appearance: Normal appearance  HENT:      Head: Normocephalic and atraumatic  Nose: Nose normal       Mouth/Throat:      Mouth: Mucous membranes are moist       Pharynx: Oropharynx is clear  Eyes:      Extraocular Movements: Extraocular movements intact  Pupils: Pupils are equal, round, and reactive to light  Cardiovascular:      Rate and Rhythm: Normal rate and regular rhythm  Pulses: Normal pulses  Pulmonary:      Effort: Pulmonary effort is normal    Abdominal:      General: Abdomen is flat  Bowel sounds are normal       Palpations: Abdomen is soft  Musculoskeletal:         General: Normal range of motion  Cervical back: Normal range of motion and neck supple  Skin:     General: Skin is warm and dry  Neurological:      General: No focal deficit present  Mental Status: She is alert and oriented to person, place, and time   Mental status is at baseline  Psychiatric:         Mood and Affect: Mood normal          Behavior: Behavior normal          Thought Content: Thought content normal          Judgment: Judgment normal              Assessment/Plan:       Maureen Maria is a 64 y o  female  hx of RNYGB at [facility] in 2012 with hx of known marginal ulcers who was discharged from [address] [facility] after endoscopic intervention x2 for bleeding marginal ulcer  Diagnoses and all orders for this visit:    Marginal ulcer       · Continue PPI therapy BID, carafate 1gram QID, and cytotec 200mcg QID,    · Continue diet as tolerated  · Plan for outpatient endoscopy in 4-6 weeks  · Your vitamin-A level is low which can affect the your night vision  -recommend that you take 10,000 IU of retinol acetate or retinal palmitate ( vitamin-A) per day for 12 weeks  It is important that you take an actual vitamin-A supplements for improving your levels, and not at beta carotene supplement  IMPORTANT: After 12weeks, STOP  the extra vitamin-A supplements and recheck the  lab lab level  · Your vitamin D is low  Both vitamin D and calcium are important for bone health  Need to have the total amount of vitamin-D that is in your multivitamin mineral supplements and what is in her calcium citrate with D together  The total should add up to between 6869-6761 IU  of vitamin D3 per day    · Follow up with dietician  · Continue TPN

## 2022-07-20 ENCOUNTER — ANESTHESIA (OUTPATIENT)
Dept: GASTROENTEROLOGY | Facility: HOSPITAL | Age: 77
End: 2022-07-20

## 2022-07-20 ENCOUNTER — ANESTHESIA EVENT (OUTPATIENT)
Dept: GASTROENTEROLOGY | Facility: HOSPITAL | Age: 77
End: 2022-07-20

## 2022-07-20 ENCOUNTER — HOSPITAL ENCOUNTER (OUTPATIENT)
Dept: GASTROENTEROLOGY | Facility: HOSPITAL | Age: 77
Setting detail: OUTPATIENT SURGERY
Discharge: HOME | End: 2022-07-20
Attending: SURGERY | Admitting: SURGERY
Payer: COMMERCIAL

## 2022-07-20 VITALS
TEMPERATURE: 98.4 F | BODY MASS INDEX: 18.95 KG/M2 | OXYGEN SATURATION: 99 % | HEART RATE: 76 BPM | SYSTOLIC BLOOD PRESSURE: 103 MMHG | HEIGHT: 64 IN | DIASTOLIC BLOOD PRESSURE: 57 MMHG | RESPIRATION RATE: 25 BRPM | WEIGHT: 111 LBS

## 2022-07-20 DIAGNOSIS — Z98.84 H/O BARIATRIC SURGERY: Primary | ICD-10-CM

## 2022-07-20 DIAGNOSIS — K21.9 GASTROESOPHAGEAL REFLUX DISEASE WITHOUT ESOPHAGITIS: ICD-10-CM

## 2022-07-20 PROCEDURE — 43235 EGD DIAGNOSTIC BRUSH WASH: CPT | Performed by: SURGERY

## 2022-07-20 RX ORDER — SODIUM CHLORIDE 9 MG/ML
125 INJECTION, SOLUTION INTRAVENOUS CONTINUOUS
Status: DISCONTINUED | OUTPATIENT
Start: 2022-07-20 | End: 2022-07-24 | Stop reason: HOSPADM

## 2022-07-20 RX ORDER — PROPOFOL 10 MG/ML
INJECTION, EMULSION INTRAVENOUS AS NEEDED
Status: DISCONTINUED | OUTPATIENT
Start: 2022-07-20 | End: 2022-07-20

## 2022-07-20 RX ORDER — LIDOCAINE HYDROCHLORIDE 20 MG/ML
INJECTION, SOLUTION EPIDURAL; INFILTRATION; INTRACAUDAL; PERINEURAL AS NEEDED
Status: DISCONTINUED | OUTPATIENT
Start: 2022-07-20 | End: 2022-07-20

## 2022-07-20 RX ADMIN — LIDOCAINE HYDROCHLORIDE 100 MG: 20 INJECTION, SOLUTION EPIDURAL; INFILTRATION; INTRACAUDAL; PERINEURAL at 14:17

## 2022-07-20 RX ADMIN — PROPOFOL 100 MG: 10 INJECTION, EMULSION INTRAVENOUS at 14:17

## 2022-07-20 RX ADMIN — SODIUM CHLORIDE: 0.9 INJECTION, SOLUTION INTRAVENOUS at 13:49

## 2022-07-20 NOTE — ANESTHESIA POSTPROCEDURE EVALUATION
Post-Op Assessment Note    CV Status:  Stable    Pain management: adequate     Mental Status:  Alert and awake   Hydration Status:  Euvolemic   PONV Controlled:  Controlled   Airway Patency:  Patent      Post Op Vitals Reviewed: Yes      Staff: Anesthesiologist, CRNA         No complications documented      BP      Temp      Pulse     Resp     SpO2      /57   Pulse 76   Temp 98 4 °F (36 9 °C) (Temporal)   Resp (!) 25   Ht 5' 4" (1 626 m)   Wt 50 3 kg (111 lb)   SpO2 99%   BMI 19 05 kg/m²

## 2022-07-20 NOTE — ANESTHESIA PREPROCEDURE EVALUATION
Procedure:  EGD    Relevant Problems   ANESTHESIA (within normal limits)      CARDIO   (+) Dyspnea on exertion      ENDO   (+) Hypothyroidism      GI/HEPATIC   (+) Gastric ulcer   (+) Gastroesophageal reflux disease without esophagitis   (+) H/O bariatric surgery - bypass      HEMATOLOGY   (+) Acute blood loss anemia   (+) Anemia      MUSCULOSKELETAL   (+) Fibromyalgia syndrome      NEURO/PSYCH   (+) Depression   (+) Fibromyalgia syndrome      PULMONARY  SOB at rest    (+) Dyspnea on exertion   (+) Shortness of breath      Nervous and Auditory   (+) Bilateral hearing loss        Physical Exam    Airway    Mallampati score: II  TM Distance: >3 FB  Neck ROM: full     Dental   No notable dental hx     Cardiovascular  Rhythm: regular, Rate: normal, Cardiovascular exam normal    Pulmonary  Pulmonary exam normal Breath sounds clear to auscultation,     Other Findings        Anesthesia Plan  ASA Score- 3     Anesthesia Type- general with ASA Monitors  Additional Monitors:   Airway Plan:     Comment:   Left Ventricle: Left ventricular cavity size is normal  Wall thickness is normal  The left ventricular ejection fraction is 55% by visual estimation  Systolic function is normal  Wall motion is normal  Diastolic function is mildly abnormal, consistent with grade I (abnormal) relaxation    Mitral Valve: There is mild regurgitation    Tricuspid Valve: There is mild to moderate regurgitation  Pulmonary artery pressure around 30-35 mmHg              Plan Factors-    Chart reviewed  EKG reviewed  Existing labs reviewed  Patient summary reviewed  Patient is not a current smoker  Induction- intravenous  Postoperative Plan-     Informed Consent- Anesthetic plan and risks discussed with patient

## 2022-07-21 ENCOUNTER — DOCUMENTATION (OUTPATIENT)
Dept: BARIATRICS | Facility: CLINIC | Age: 77
End: 2022-07-21

## 2022-07-21 NOTE — PROGRESS NOTES
Reached out to nurse at short-term rehab re: mutual patient Luis Sanford  Per VM, nurse was concerned that Pt was being discharged by her insurance company while on TPN  Nurse conveyed that she is not comfortable sending Pt home on TPN  Nurse inquired as to whether she should begin pursuing Medicare for Pt or if we want to discontinue TPN  Left VM explaining that, per bariatric fellow, our medical recommendation is for Pt to continue on TPN and upon discharge, Pt needs to follow-up with our RDs for the TPN orders to be renewed  Encouraged nurse to reach out to office with any questons

## 2022-07-25 ENCOUNTER — APPOINTMENT (EMERGENCY)
Dept: RADIOLOGY | Facility: HOSPITAL | Age: 77
DRG: 314 | End: 2022-07-25
Payer: COMMERCIAL

## 2022-07-25 ENCOUNTER — HOSPITAL ENCOUNTER (INPATIENT)
Facility: HOSPITAL | Age: 77
LOS: 14 days | Discharge: NURSING, REHAB OR LONG-TERM CARE | DRG: 314 | End: 2022-08-09
Attending: EMERGENCY MEDICINE | Admitting: EMERGENCY MEDICINE
Payer: COMMERCIAL

## 2022-07-25 DIAGNOSIS — I95.9 HYPOTENSION: ICD-10-CM

## 2022-07-25 DIAGNOSIS — Z98.84 H/O BARIATRIC SURGERY: ICD-10-CM

## 2022-07-25 DIAGNOSIS — E43 SEVERE PROTEIN-CALORIE MALNUTRITION (HCC): ICD-10-CM

## 2022-07-25 DIAGNOSIS — R01.1 CARDIAC MURMUR: ICD-10-CM

## 2022-07-25 DIAGNOSIS — E87.1 HYPONATREMIA: ICD-10-CM

## 2022-07-25 DIAGNOSIS — L89.152 STAGE II PRESSURE ULCER OF SACRAL REGION (HCC): ICD-10-CM

## 2022-07-25 DIAGNOSIS — R50.9 FEVER: ICD-10-CM

## 2022-07-25 DIAGNOSIS — R78.81 GRAM-POSITIVE BACTEREMIA: ICD-10-CM

## 2022-07-25 DIAGNOSIS — F32.A DEPRESSION, UNSPECIFIED DEPRESSION TYPE: ICD-10-CM

## 2022-07-25 DIAGNOSIS — N17.9 AKI (ACUTE KIDNEY INJURY) (HCC): ICD-10-CM

## 2022-07-25 DIAGNOSIS — A41.9 SEPSIS (HCC): Primary | ICD-10-CM

## 2022-07-25 LAB
ALBUMIN SERPL BCP-MCNC: 2.1 G/DL (ref 3.5–5)
ALP SERPL-CCNC: 100 U/L (ref 46–116)
ALT SERPL W P-5'-P-CCNC: 30 U/L (ref 12–78)
ANION GAP SERPL CALCULATED.3IONS-SCNC: 6 MMOL/L (ref 4–13)
APTT PPP: 34 SECONDS (ref 23–37)
AST SERPL W P-5'-P-CCNC: 35 U/L (ref 5–45)
BASOPHILS # BLD AUTO: 0.04 THOUSANDS/ΜL (ref 0–0.1)
BASOPHILS NFR BLD AUTO: 0 % (ref 0–1)
BILIRUB SERPL-MCNC: 0.34 MG/DL (ref 0.2–1)
BUN SERPL-MCNC: 30 MG/DL (ref 5–25)
CALCIUM ALBUM COR SERPL-MCNC: 9.9 MG/DL (ref 8.3–10.1)
CALCIUM SERPL-MCNC: 8.4 MG/DL (ref 8.3–10.1)
CARDIAC TROPONIN I PNL SERPL HS: 6 NG/L
CHLORIDE SERPL-SCNC: 105 MMOL/L (ref 96–108)
CO2 SERPL-SCNC: 20 MMOL/L (ref 21–32)
CREAT SERPL-MCNC: 1.16 MG/DL (ref 0.6–1.3)
EOSINOPHIL # BLD AUTO: 0.36 THOUSAND/ΜL (ref 0–0.61)
EOSINOPHIL NFR BLD AUTO: 3 % (ref 0–6)
ERYTHROCYTE [DISTWIDTH] IN BLOOD BY AUTOMATED COUNT: 21.3 % (ref 11.6–15.1)
FLUAV RNA RESP QL NAA+PROBE: NEGATIVE
FLUBV RNA RESP QL NAA+PROBE: NEGATIVE
GFR SERPL CREATININE-BSD FRML MDRD: 44 ML/MIN/1.73SQ M
GLUCOSE SERPL-MCNC: 124 MG/DL (ref 65–140)
HCT VFR BLD AUTO: 25.6 % (ref 34.8–46.1)
HGB BLD-MCNC: 8.2 G/DL (ref 11.5–15.4)
IMM GRANULOCYTES # BLD AUTO: 0.3 THOUSAND/UL (ref 0–0.2)
IMM GRANULOCYTES NFR BLD AUTO: 2 % (ref 0–2)
INR PPP: 1.2 (ref 0.84–1.19)
LACTATE SERPL-SCNC: 1.8 MMOL/L (ref 0.5–2)
LYMPHOCYTES # BLD AUTO: 1.2 THOUSANDS/ΜL (ref 0.6–4.47)
LYMPHOCYTES NFR BLD AUTO: 9 % (ref 14–44)
MCH RBC QN AUTO: 30.8 PG (ref 26.8–34.3)
MCHC RBC AUTO-ENTMCNC: 32 G/DL (ref 31.4–37.4)
MCV RBC AUTO: 96 FL (ref 82–98)
MONOCYTES # BLD AUTO: 0.44 THOUSAND/ΜL (ref 0.17–1.22)
MONOCYTES NFR BLD AUTO: 3 % (ref 4–12)
NEUTROPHILS # BLD AUTO: 11.47 THOUSANDS/ΜL (ref 1.85–7.62)
NEUTS SEG NFR BLD AUTO: 83 % (ref 43–75)
NRBC BLD AUTO-RTO: 0 /100 WBCS
PLATELET # BLD AUTO: 345 THOUSANDS/UL (ref 149–390)
PMV BLD AUTO: 10.2 FL (ref 8.9–12.7)
POTASSIUM SERPL-SCNC: 4.4 MMOL/L (ref 3.5–5.3)
PROCALCITONIN SERPL-MCNC: 0.6 NG/ML
PROT SERPL-MCNC: 7.6 G/DL (ref 6.4–8.4)
PROTHROMBIN TIME: 15.4 SECONDS (ref 11.6–14.5)
RBC # BLD AUTO: 2.66 MILLION/UL (ref 3.81–5.12)
RSV RNA RESP QL NAA+PROBE: NEGATIVE
SARS-COV-2 RNA RESP QL NAA+PROBE: NEGATIVE
SODIUM SERPL-SCNC: 131 MMOL/L (ref 135–147)
WBC # BLD AUTO: 13.81 THOUSAND/UL (ref 4.31–10.16)

## 2022-07-25 PROCEDURE — 85610 PROTHROMBIN TIME: CPT | Performed by: EMERGENCY MEDICINE

## 2022-07-25 PROCEDURE — 87186 SC STD MICRODIL/AGAR DIL: CPT | Performed by: EMERGENCY MEDICINE

## 2022-07-25 PROCEDURE — 99285 EMERGENCY DEPT VISIT HI MDM: CPT

## 2022-07-25 PROCEDURE — 87040 BLOOD CULTURE FOR BACTERIA: CPT | Performed by: EMERGENCY MEDICINE

## 2022-07-25 PROCEDURE — 87154 CUL TYP ID BLD PTHGN 6+ TRGT: CPT | Performed by: EMERGENCY MEDICINE

## 2022-07-25 PROCEDURE — 0241U HB NFCT DS VIR RESP RNA 4 TRGT: CPT | Performed by: EMERGENCY MEDICINE

## 2022-07-25 PROCEDURE — 96366 THER/PROPH/DIAG IV INF ADDON: CPT

## 2022-07-25 PROCEDURE — 83605 ASSAY OF LACTIC ACID: CPT | Performed by: EMERGENCY MEDICINE

## 2022-07-25 PROCEDURE — 84484 ASSAY OF TROPONIN QUANT: CPT | Performed by: EMERGENCY MEDICINE

## 2022-07-25 PROCEDURE — 99291 CRITICAL CARE FIRST HOUR: CPT | Performed by: EMERGENCY MEDICINE

## 2022-07-25 PROCEDURE — 80053 COMPREHEN METABOLIC PANEL: CPT | Performed by: EMERGENCY MEDICINE

## 2022-07-25 PROCEDURE — 87449 NOS EACH ORGANISM AG IA: CPT | Performed by: EMERGENCY MEDICINE

## 2022-07-25 PROCEDURE — 36415 COLL VENOUS BLD VENIPUNCTURE: CPT | Performed by: EMERGENCY MEDICINE

## 2022-07-25 PROCEDURE — 71045 X-RAY EXAM CHEST 1 VIEW: CPT

## 2022-07-25 PROCEDURE — 96365 THER/PROPH/DIAG IV INF INIT: CPT

## 2022-07-25 PROCEDURE — 87077 CULTURE AEROBIC IDENTIFY: CPT | Performed by: EMERGENCY MEDICINE

## 2022-07-25 PROCEDURE — 85730 THROMBOPLASTIN TIME PARTIAL: CPT | Performed by: EMERGENCY MEDICINE

## 2022-07-25 PROCEDURE — 84145 PROCALCITONIN (PCT): CPT | Performed by: EMERGENCY MEDICINE

## 2022-07-25 PROCEDURE — 85025 COMPLETE CBC W/AUTO DIFF WBC: CPT | Performed by: EMERGENCY MEDICINE

## 2022-07-25 PROCEDURE — 93005 ELECTROCARDIOGRAM TRACING: CPT

## 2022-07-25 RX ORDER — VANCOMYCIN HYDROCHLORIDE 1 G/200ML
20 INJECTION, SOLUTION INTRAVENOUS ONCE
Status: COMPLETED | OUTPATIENT
Start: 2022-07-25 | End: 2022-07-26

## 2022-07-25 RX ORDER — ACETAMINOPHEN 325 MG/1
325 TABLET ORAL ONCE
Status: COMPLETED | OUTPATIENT
Start: 2022-07-25 | End: 2022-07-25

## 2022-07-25 RX ORDER — ACETAMINOPHEN 160 MG/5ML
1 SUSPENSION, ORAL (FINAL DOSE FORM) ORAL ONCE
Status: COMPLETED | OUTPATIENT
Start: 2022-07-25 | End: 2022-07-25

## 2022-07-25 RX ADMIN — ACETAMINOPHEN 325 MG: 325 TABLET ORAL at 22:36

## 2022-07-25 RX ADMIN — CEFEPIME HYDROCHLORIDE 2000 MG: 2 INJECTION, POWDER, FOR SOLUTION INTRAVENOUS at 22:37

## 2022-07-25 RX ADMIN — SODIUM CHLORIDE 1000 ML: 9 INJECTION, SOLUTION INTRAVENOUS at 22:40

## 2022-07-26 ENCOUNTER — APPOINTMENT (INPATIENT)
Dept: NON INVASIVE DIAGNOSTICS | Facility: HOSPITAL | Age: 77
DRG: 314 | End: 2022-07-26
Payer: COMMERCIAL

## 2022-07-26 LAB
2HR DELTA HS TROPONIN: 2 NG/L
4HR DELTA HS TROPONIN: 2 NG/L
ABO GROUP BLD: NORMAL
ALBUMIN SERPL BCP-MCNC: 1.8 G/DL (ref 3.5–5)
ALP SERPL-CCNC: 83 U/L (ref 46–116)
ALT SERPL W P-5'-P-CCNC: 37 U/L (ref 12–78)
AMORPH URATE CRY URNS QL MICRO: ABNORMAL
ANION GAP SERPL CALCULATED.3IONS-SCNC: 7 MMOL/L (ref 4–13)
ANION GAP SERPL CALCULATED.3IONS-SCNC: 8 MMOL/L (ref 4–13)
AORTIC ROOT: 3.1 CM
APICAL FOUR CHAMBER EJECTION FRACTION: 61 %
ASCENDING AORTA: 3.1 CM
AST SERPL W P-5'-P-CCNC: 41 U/L (ref 5–45)
ATRIAL RATE: 117 BPM
BACTERIA UR QL AUTO: ABNORMAL /HPF
BASOPHILS # BLD AUTO: 0.05 THOUSANDS/ΜL (ref 0–0.1)
BASOPHILS NFR BLD AUTO: 0 % (ref 0–1)
BILIRUB SERPL-MCNC: 0.35 MG/DL (ref 0.2–1)
BILIRUB UR QL STRIP: NEGATIVE
BLD GP AB SCN SERPL QL: NEGATIVE
BUN SERPL-MCNC: 25 MG/DL (ref 5–25)
BUN SERPL-MCNC: 31 MG/DL (ref 5–25)
CA-I BLD-SCNC: 1.08 MMOL/L (ref 1.12–1.32)
CALCIUM ALBUM COR SERPL-MCNC: 9.8 MG/DL (ref 8.3–10.1)
CALCIUM SERPL-MCNC: 8 MG/DL (ref 8.3–10.1)
CALCIUM SERPL-MCNC: 8.2 MG/DL (ref 8.3–10.1)
CARDIAC TROPONIN I PNL SERPL HS: 8 NG/L
CARDIAC TROPONIN I PNL SERPL HS: 8 NG/L
CHLORIDE SERPL-SCNC: 106 MMOL/L (ref 96–108)
CHLORIDE SERPL-SCNC: 107 MMOL/L (ref 96–108)
CLARITY UR: ABNORMAL
CO2 SERPL-SCNC: 19 MMOL/L (ref 21–32)
CO2 SERPL-SCNC: 21 MMOL/L (ref 21–32)
COLOR UR: YELLOW
CREAT SERPL-MCNC: 1.2 MG/DL (ref 0.6–1.3)
CREAT SERPL-MCNC: 1.25 MG/DL (ref 0.6–1.3)
EOSINOPHIL # BLD AUTO: 0.12 THOUSAND/ΜL (ref 0–0.61)
EOSINOPHIL NFR BLD AUTO: 1 % (ref 0–6)
ERYTHROCYTE [DISTWIDTH] IN BLOOD BY AUTOMATED COUNT: 21.5 % (ref 11.6–15.1)
ERYTHROCYTE [DISTWIDTH] IN BLOOD BY AUTOMATED COUNT: 22.2 % (ref 11.6–15.1)
FRACTIONAL SHORTENING: 55 (ref 28–44)
GFR SERPL CREATININE-BSD FRML MDRD: 41 ML/MIN/1.73SQ M
GFR SERPL CREATININE-BSD FRML MDRD: 43 ML/MIN/1.73SQ M
GLUCOSE SERPL-MCNC: 107 MG/DL (ref 65–140)
GLUCOSE SERPL-MCNC: 110 MG/DL (ref 65–140)
GLUCOSE UR STRIP-MCNC: NEGATIVE MG/DL
GRAN CASTS #/AREA URNS LPF: ABNORMAL /[LPF]
HCT VFR BLD AUTO: 20.9 % (ref 34.8–46.1)
HCT VFR BLD AUTO: 23.1 % (ref 34.8–46.1)
HGB BLD-MCNC: 6.6 G/DL (ref 11.5–15.4)
HGB BLD-MCNC: 7.6 G/DL (ref 11.5–15.4)
HGB UR QL STRIP.AUTO: ABNORMAL
HYALINE CASTS #/AREA URNS LPF: ABNORMAL /LPF
IMM GRANULOCYTES # BLD AUTO: 0.28 THOUSAND/UL (ref 0–0.2)
IMM GRANULOCYTES NFR BLD AUTO: 1 % (ref 0–2)
INTERVENTRICULAR SEPTUM IN DIASTOLE (PARASTERNAL SHORT AXIS VIEW): 1.1 CM
INTERVENTRICULAR SEPTUM: 1.1 CM (ref 0.6–1.1)
IVC: 2.3 MM
KETONES UR STRIP-MCNC: NEGATIVE MG/DL
L PNEUMO1 AG UR QL IA.RAPID: NEGATIVE
LEFT ATRIUM SIZE: 3.5 CM
LEFT INTERNAL DIMENSION IN SYSTOLE: 1.7 CM (ref 2.1–4)
LEFT VENTRICLE DIASTOLIC VOLUME (MOD BIPLANE): 75 ML
LEFT VENTRICLE SYSTOLIC VOLUME (MOD BIPLANE): 28 ML
LEFT VENTRICULAR INTERNAL DIMENSION IN DIASTOLE: 3.8 CM (ref 3.5–6)
LEFT VENTRICULAR POSTERIOR WALL IN END DIASTOLE: 1.1 CM
LEFT VENTRICULAR STROKE VOLUME: 53 ML
LEUKOCYTE ESTERASE UR QL STRIP: ABNORMAL
LV EF: 63 %
LVSV (TEICH): 53 ML
LYMPHOCYTES # BLD AUTO: 2.02 THOUSANDS/ΜL (ref 0.6–4.47)
LYMPHOCYTES NFR BLD AUTO: 9 % (ref 14–44)
MAGNESIUM SERPL-MCNC: 1.9 MG/DL (ref 1.6–2.6)
MAGNESIUM SERPL-MCNC: 2.3 MG/DL (ref 1.6–2.6)
MCH RBC QN AUTO: 30.4 PG (ref 26.8–34.3)
MCH RBC QN AUTO: 30.7 PG (ref 26.8–34.3)
MCHC RBC AUTO-ENTMCNC: 31.6 G/DL (ref 31.4–37.4)
MCHC RBC AUTO-ENTMCNC: 32.9 G/DL (ref 31.4–37.4)
MCV RBC AUTO: 92 FL (ref 82–98)
MCV RBC AUTO: 97 FL (ref 82–98)
MONOCYTES # BLD AUTO: 1.34 THOUSAND/ΜL (ref 0.17–1.22)
MONOCYTES NFR BLD AUTO: 6 % (ref 4–12)
MUCOUS THREADS UR QL AUTO: ABNORMAL
MV E'TISSUE VEL-SEP: 12 CM/S
NEUTROPHILS # BLD AUTO: 18.34 THOUSANDS/ΜL (ref 1.85–7.62)
NEUTS SEG NFR BLD AUTO: 83 % (ref 43–75)
NITRITE UR QL STRIP: NEGATIVE
NON-SQ EPI CELLS URNS QL MICRO: ABNORMAL /HPF
NRBC BLD AUTO-RTO: 0 /100 WBCS
P AXIS: 44 DEGREES
PH UR STRIP.AUTO: 5 [PH]
PHOSPHATE SERPL-MCNC: 3.3 MG/DL (ref 2.3–4.1)
PHOSPHATE SERPL-MCNC: 3.6 MG/DL (ref 2.3–4.1)
PLATELET # BLD AUTO: 284 THOUSANDS/UL (ref 149–390)
PLATELET # BLD AUTO: 299 THOUSANDS/UL (ref 149–390)
PMV BLD AUTO: 10.2 FL (ref 8.9–12.7)
PMV BLD AUTO: 10.2 FL (ref 8.9–12.7)
POTASSIUM SERPL-SCNC: 3.8 MMOL/L (ref 3.5–5.3)
POTASSIUM SERPL-SCNC: 4 MMOL/L (ref 3.5–5.3)
PR INTERVAL: 124 MS
PROT SERPL-MCNC: 6.5 G/DL (ref 6.4–8.4)
PROT UR STRIP-MCNC: ABNORMAL MG/DL
QRS AXIS: 20 DEGREES
QRSD INTERVAL: 76 MS
QT INTERVAL: 328 MS
QTC INTERVAL: 457 MS
RA PRESSURE ESTIMATED: 8 MMHG
RBC # BLD AUTO: 2.15 MILLION/UL (ref 3.81–5.12)
RBC # BLD AUTO: 2.5 MILLION/UL (ref 3.81–5.12)
RBC #/AREA URNS AUTO: ABNORMAL /HPF
RH BLD: POSITIVE
RV PSP: 37 MMHG
S PNEUM AG UR QL: NEGATIVE
SL CV LV EF: 75
SL CV PED ECHO LEFT VENTRICLE DIASTOLIC VOLUME (MOD BIPLANE) 2D: 61 ML
SL CV PED ECHO LEFT VENTRICLE SYSTOLIC VOLUME (MOD BIPLANE) 2D: 9 ML
SODIUM SERPL-SCNC: 134 MMOL/L (ref 135–147)
SODIUM SERPL-SCNC: 134 MMOL/L (ref 135–147)
SP GR UR STRIP.AUTO: 1.01 (ref 1–1.03)
SPECIMEN EXPIRATION DATE: NORMAL
T WAVE AXIS: 10 DEGREES
TR MAX PG: 29 MMHG
TR PEAK VELOCITY: 2.7 M/S
TRICUSPID ANNULAR PLANE SYSTOLIC EXCURSION: 2.5 CM
TRICUSPID VALVE PEAK REGURGITATION VELOCITY: 2.71 M/S
UROBILINOGEN UR STRIP-ACNC: <2 MG/DL
VANCOMYCIN SERPL-MCNC: 8.7 UG/ML (ref 10–20)
VENTRICULAR RATE: 117 BPM
WBC # BLD AUTO: 22.15 THOUSAND/UL (ref 4.31–10.16)
WBC # BLD AUTO: 25.2 THOUSAND/UL (ref 4.31–10.16)
WBC #/AREA URNS AUTO: ABNORMAL /HPF

## 2022-07-26 PROCEDURE — 86850 RBC ANTIBODY SCREEN: CPT | Performed by: EMERGENCY MEDICINE

## 2022-07-26 PROCEDURE — 83735 ASSAY OF MAGNESIUM: CPT | Performed by: EMERGENCY MEDICINE

## 2022-07-26 PROCEDURE — 86900 BLOOD TYPING SEROLOGIC ABO: CPT | Performed by: EMERGENCY MEDICINE

## 2022-07-26 PROCEDURE — 30233N1 TRANSFUSION OF NONAUTOLOGOUS RED BLOOD CELLS INTO PERIPHERAL VEIN, PERCUTANEOUS APPROACH: ICD-10-PCS | Performed by: HOSPITALIST

## 2022-07-26 PROCEDURE — 86920 COMPATIBILITY TEST SPIN: CPT

## 2022-07-26 PROCEDURE — 87077 CULTURE AEROBIC IDENTIFY: CPT | Performed by: EMERGENCY MEDICINE

## 2022-07-26 PROCEDURE — 81001 URINALYSIS AUTO W/SCOPE: CPT | Performed by: EMERGENCY MEDICINE

## 2022-07-26 PROCEDURE — 93308 TTE F-UP OR LMTD: CPT

## 2022-07-26 PROCEDURE — 87505 NFCT AGENT DETECTION GI: CPT

## 2022-07-26 PROCEDURE — 83735 ASSAY OF MAGNESIUM: CPT

## 2022-07-26 PROCEDURE — P9040 RBC LEUKOREDUCED IRRADIATED: HCPCS

## 2022-07-26 PROCEDURE — 93010 ELECTROCARDIOGRAM REPORT: CPT | Performed by: INTERNAL MEDICINE

## 2022-07-26 PROCEDURE — 36415 COLL VENOUS BLD VENIPUNCTURE: CPT | Performed by: EMERGENCY MEDICINE

## 2022-07-26 PROCEDURE — 80053 COMPREHEN METABOLIC PANEL: CPT | Performed by: EMERGENCY MEDICINE

## 2022-07-26 PROCEDURE — 99291 CRITICAL CARE FIRST HOUR: CPT | Performed by: EMERGENCY MEDICINE

## 2022-07-26 PROCEDURE — 85025 COMPLETE CBC W/AUTO DIFF WBC: CPT

## 2022-07-26 PROCEDURE — 85027 COMPLETE CBC AUTOMATED: CPT | Performed by: EMERGENCY MEDICINE

## 2022-07-26 PROCEDURE — 84484 ASSAY OF TROPONIN QUANT: CPT | Performed by: EMERGENCY MEDICINE

## 2022-07-26 PROCEDURE — 87103 BLOOD FUNGUS CULTURE: CPT | Performed by: EMERGENCY MEDICINE

## 2022-07-26 PROCEDURE — 93325 DOPPLER ECHO COLOR FLOW MAPG: CPT | Performed by: INTERNAL MEDICINE

## 2022-07-26 PROCEDURE — C9113 INJ PANTOPRAZOLE SODIUM, VIA: HCPCS

## 2022-07-26 PROCEDURE — 82330 ASSAY OF CALCIUM: CPT

## 2022-07-26 PROCEDURE — 84100 ASSAY OF PHOSPHORUS: CPT

## 2022-07-26 PROCEDURE — 86901 BLOOD TYPING SEROLOGIC RH(D): CPT | Performed by: EMERGENCY MEDICINE

## 2022-07-26 PROCEDURE — 87493 C DIFF AMPLIFIED PROBE: CPT

## 2022-07-26 PROCEDURE — NC001 PR NO CHARGE: Performed by: INTERNAL MEDICINE

## 2022-07-26 PROCEDURE — 93308 TTE F-UP OR LMTD: CPT | Performed by: INTERNAL MEDICINE

## 2022-07-26 PROCEDURE — 93321 DOPPLER ECHO F-UP/LMTD STD: CPT | Performed by: INTERNAL MEDICINE

## 2022-07-26 PROCEDURE — 87081 CULTURE SCREEN ONLY: CPT | Performed by: EMERGENCY MEDICINE

## 2022-07-26 PROCEDURE — 84100 ASSAY OF PHOSPHORUS: CPT | Performed by: EMERGENCY MEDICINE

## 2022-07-26 PROCEDURE — 96367 TX/PROPH/DG ADDL SEQ IV INF: CPT

## 2022-07-26 PROCEDURE — 87040 BLOOD CULTURE FOR BACTERIA: CPT | Performed by: EMERGENCY MEDICINE

## 2022-07-26 PROCEDURE — 93970 EXTREMITY STUDY: CPT

## 2022-07-26 PROCEDURE — 93970 EXTREMITY STUDY: CPT | Performed by: SURGERY

## 2022-07-26 PROCEDURE — 80048 BASIC METABOLIC PNL TOTAL CA: CPT

## 2022-07-26 PROCEDURE — 80202 ASSAY OF VANCOMYCIN: CPT | Performed by: INTERNAL MEDICINE

## 2022-07-26 PROCEDURE — 96365 THER/PROPH/DIAG IV INF INIT: CPT

## 2022-07-26 RX ORDER — SODIUM CHLORIDE, SODIUM GLUCONATE, SODIUM ACETATE, POTASSIUM CHLORIDE, MAGNESIUM CHLORIDE, SODIUM PHOSPHATE, DIBASIC, AND POTASSIUM PHOSPHATE .53; .5; .37; .037; .03; .012; .00082 G/100ML; G/100ML; G/100ML; G/100ML; G/100ML; G/100ML; G/100ML
550 INJECTION, SOLUTION INTRAVENOUS ONCE
Status: COMPLETED | OUTPATIENT
Start: 2022-07-26 | End: 2022-07-26

## 2022-07-26 RX ORDER — SODIUM CHLORIDE, SODIUM GLUCONATE, SODIUM ACETATE, POTASSIUM CHLORIDE, MAGNESIUM CHLORIDE, SODIUM PHOSPHATE, DIBASIC, AND POTASSIUM PHOSPHATE .53; .5; .37; .037; .03; .012; .00082 G/100ML; G/100ML; G/100ML; G/100ML; G/100ML; G/100ML; G/100ML
500 INJECTION, SOLUTION INTRAVENOUS ONCE
Status: DISCONTINUED | OUTPATIENT
Start: 2022-07-26 | End: 2022-07-26

## 2022-07-26 RX ORDER — CALCIUM GLUCONATE 20 MG/ML
1 INJECTION, SOLUTION INTRAVENOUS ONCE
Status: COMPLETED | OUTPATIENT
Start: 2022-07-26 | End: 2022-07-27

## 2022-07-26 RX ORDER — SODIUM CHLORIDE, SODIUM GLUCONATE, SODIUM ACETATE, POTASSIUM CHLORIDE, MAGNESIUM CHLORIDE, SODIUM PHOSPHATE, DIBASIC, AND POTASSIUM PHOSPHATE .53; .5; .37; .037; .03; .012; .00082 G/100ML; G/100ML; G/100ML; G/100ML; G/100ML; G/100ML; G/100ML
75 INJECTION, SOLUTION INTRAVENOUS CONTINUOUS
Status: DISCONTINUED | OUTPATIENT
Start: 2022-07-26 | End: 2022-07-28

## 2022-07-26 RX ORDER — LEVOTHYROXINE SODIUM 112 UG/1
112 TABLET ORAL
Status: DISCONTINUED | OUTPATIENT
Start: 2022-07-26 | End: 2022-08-09 | Stop reason: HOSPADM

## 2022-07-26 RX ORDER — NOREPINEPHRINE BITARTRATE 1 MG/ML
INJECTION, SOLUTION INTRAVENOUS
Status: COMPLETED
Start: 2022-07-26 | End: 2022-07-26

## 2022-07-26 RX ORDER — LANOLIN ALCOHOL/MO/W.PET/CERES
6 CREAM (GRAM) TOPICAL
Status: DISCONTINUED | OUTPATIENT
Start: 2022-07-26 | End: 2022-08-09 | Stop reason: HOSPADM

## 2022-07-26 RX ORDER — ACETAMINOPHEN 325 MG/1
650 TABLET ORAL EVERY 6 HOURS PRN
Status: DISCONTINUED | OUTPATIENT
Start: 2022-07-26 | End: 2022-07-30

## 2022-07-26 RX ORDER — SODIUM CHLORIDE, SODIUM GLUCONATE, SODIUM ACETATE, POTASSIUM CHLORIDE, MAGNESIUM CHLORIDE, SODIUM PHOSPHATE, DIBASIC, AND POTASSIUM PHOSPHATE .53; .5; .37; .037; .03; .012; .00082 G/100ML; G/100ML; G/100ML; G/100ML; G/100ML; G/100ML; G/100ML
1000 INJECTION, SOLUTION INTRAVENOUS ONCE
Status: COMPLETED | OUTPATIENT
Start: 2022-07-26 | End: 2022-07-26

## 2022-07-26 RX ORDER — SUCRALFATE 1 G/1
1 TABLET ORAL
Status: DISCONTINUED | OUTPATIENT
Start: 2022-07-26 | End: 2022-08-09 | Stop reason: HOSPADM

## 2022-07-26 RX ORDER — LORAZEPAM 0.5 MG/1
0.25 TABLET ORAL
Status: DISCONTINUED | OUTPATIENT
Start: 2022-07-26 | End: 2022-07-27

## 2022-07-26 RX ORDER — VANCOMYCIN HYDROCHLORIDE 1 G/200ML
20 INJECTION, SOLUTION INTRAVENOUS ONCE
Status: COMPLETED | OUTPATIENT
Start: 2022-07-26 | End: 2022-07-26

## 2022-07-26 RX ORDER — FERROUS SULFATE 325(65) MG
325 TABLET ORAL
Status: DISCONTINUED | OUTPATIENT
Start: 2022-07-26 | End: 2022-07-26

## 2022-07-26 RX ORDER — FERROUS SULFATE 325(65) MG
325 TABLET ORAL
Status: DISCONTINUED | OUTPATIENT
Start: 2022-07-27 | End: 2022-07-26

## 2022-07-26 RX ORDER — HEPARIN SODIUM 5000 [USP'U]/ML
5000 INJECTION, SOLUTION INTRAVENOUS; SUBCUTANEOUS EVERY 8 HOURS SCHEDULED
Status: DISCONTINUED | OUTPATIENT
Start: 2022-07-26 | End: 2022-07-26

## 2022-07-26 RX ORDER — PANTOPRAZOLE SODIUM 40 MG/10ML
40 INJECTION, POWDER, LYOPHILIZED, FOR SOLUTION INTRAVENOUS EVERY 12 HOURS SCHEDULED
Status: DISCONTINUED | OUTPATIENT
Start: 2022-07-26 | End: 2022-07-28

## 2022-07-26 RX ORDER — PANTOPRAZOLE SODIUM 40 MG/1
40 TABLET, DELAYED RELEASE ORAL
Status: DISCONTINUED | OUTPATIENT
Start: 2022-07-26 | End: 2022-07-26

## 2022-07-26 RX ORDER — MISOPROSTOL 200 UG/1
200 TABLET ORAL
Status: DISCONTINUED | OUTPATIENT
Start: 2022-07-26 | End: 2022-08-09 | Stop reason: HOSPADM

## 2022-07-26 RX ADMIN — LEVOTHYROXINE SODIUM 112 MCG: 112 TABLET ORAL at 06:09

## 2022-07-26 RX ADMIN — SUCRALFATE 1 G: 1 TABLET ORAL at 17:32

## 2022-07-26 RX ADMIN — SODIUM CHLORIDE, SODIUM GLUCONATE, SODIUM ACETATE, POTASSIUM CHLORIDE, MAGNESIUM CHLORIDE, SODIUM PHOSPHATE, DIBASIC, AND POTASSIUM PHOSPHATE 500 ML: .53; .5; .37; .037; .03; .012; .00082 INJECTION, SOLUTION INTRAVENOUS at 01:17

## 2022-07-26 RX ADMIN — SUCRALFATE 1 G: 1 TABLET ORAL at 21:21

## 2022-07-26 RX ADMIN — LORAZEPAM 0.25 MG: 0.5 TABLET ORAL at 21:21

## 2022-07-26 RX ADMIN — SODIUM CHLORIDE, SODIUM GLUCONATE, SODIUM ACETATE, POTASSIUM CHLORIDE, MAGNESIUM CHLORIDE, SODIUM PHOSPHATE, DIBASIC, AND POTASSIUM PHOSPHATE 1000 ML: .53; .5; .37; .037; .03; .012; .00082 INJECTION, SOLUTION INTRAVENOUS at 04:19

## 2022-07-26 RX ADMIN — NOREPINEPHRINE BITARTRATE 2 MCG/MIN: 1 INJECTION, SOLUTION, CONCENTRATE INTRAVENOUS at 05:24

## 2022-07-26 RX ADMIN — CALCIUM GLUCONATE 1 G: 20 INJECTION, SOLUTION INTRAVENOUS at 16:03

## 2022-07-26 RX ADMIN — Medication 6 MG: at 21:21

## 2022-07-26 RX ADMIN — CEFEPIME HYDROCHLORIDE 2000 MG: 2 INJECTION, POWDER, FOR SOLUTION INTRAVENOUS at 22:49

## 2022-07-26 RX ADMIN — VANCOMYCIN HYDROCHLORIDE 1000 MG: 1 INJECTION, SOLUTION INTRAVENOUS at 00:19

## 2022-07-26 RX ADMIN — SUCRALFATE 1 G: 1 TABLET ORAL at 08:45

## 2022-07-26 RX ADMIN — PANTOPRAZOLE SODIUM 40 MG: 40 TABLET, DELAYED RELEASE ORAL at 06:09

## 2022-07-26 RX ADMIN — MISOPROSTOL 200 MCG: 200 TABLET ORAL at 08:45

## 2022-07-26 RX ADMIN — NOREPINEPHRINE BITARTRATE 4 MG: 1 INJECTION, SOLUTION, CONCENTRATE INTRAVENOUS at 05:24

## 2022-07-26 RX ADMIN — SODIUM CHLORIDE, SODIUM GLUCONATE, SODIUM ACETATE, POTASSIUM CHLORIDE, MAGNESIUM CHLORIDE, SODIUM PHOSPHATE, DIBASIC, AND POTASSIUM PHOSPHATE 550 ML: .53; .5; .37; .037; .03; .012; .00082 INJECTION, SOLUTION INTRAVENOUS at 01:33

## 2022-07-26 RX ADMIN — SODIUM CHLORIDE, SODIUM GLUCONATE, SODIUM ACETATE, POTASSIUM CHLORIDE, MAGNESIUM CHLORIDE, SODIUM PHOSPHATE, DIBASIC, AND POTASSIUM PHOSPHATE 75 ML/HR: .53; .5; .37; .037; .03; .012; .00082 INJECTION, SOLUTION INTRAVENOUS at 22:48

## 2022-07-26 RX ADMIN — PANTOPRAZOLE SODIUM 40 MG: 40 INJECTION, POWDER, FOR SOLUTION INTRAVENOUS at 21:08

## 2022-07-26 RX ADMIN — MISOPROSTOL 200 MCG: 200 TABLET ORAL at 21:22

## 2022-07-26 RX ADMIN — VANCOMYCIN HYDROCHLORIDE 1000 MG: 1 INJECTION, SOLUTION INTRAVENOUS at 16:04

## 2022-07-26 RX ADMIN — MISOPROSTOL 200 MCG: 200 TABLET ORAL at 12:08

## 2022-07-26 RX ADMIN — CEFEPIME HYDROCHLORIDE 2000 MG: 2 INJECTION, POWDER, FOR SOLUTION INTRAVENOUS at 12:10

## 2022-07-26 RX ADMIN — SODIUM CHLORIDE, SODIUM GLUCONATE, SODIUM ACETATE, POTASSIUM CHLORIDE, MAGNESIUM CHLORIDE, SODIUM PHOSPHATE, DIBASIC, AND POTASSIUM PHOSPHATE 50 ML/HR: .53; .5; .37; .037; .03; .012; .00082 INJECTION, SOLUTION INTRAVENOUS at 05:23

## 2022-07-26 RX ADMIN — PANTOPRAZOLE SODIUM 40 MG: 40 INJECTION, POWDER, FOR SOLUTION INTRAVENOUS at 12:10

## 2022-07-26 RX ADMIN — ACETAMINOPHEN 650 MG: 325 TABLET ORAL at 12:08

## 2022-07-26 RX ADMIN — MISOPROSTOL 200 MCG: 200 TABLET ORAL at 17:32

## 2022-07-26 NOTE — H&P
H&P Exam - Critical Care   Coleman Brown 76 y o  female MRN: [id number]  [facility]: [id number]      -------------------------------------------------------------------------------------------------------------  Chief Complaint: fever    History of Present Illness     Coleman Brwon is a 76 y o  female with a PMH significant for bipolar disorder, HTN, inflammatory polyneuropathy, previous Savage-en-Y gastric bypass currently on TPN, who presented to the ED on 7/25 from a facility with a fever of 105 5F  She complained of polyuria, polydipsia, and chills in the ED  Currently she does not have any complaints  She has chronic RUQ abdominal pain which she attributes to a gastric ulcer  Chart review shows that she was recently hospitalized for acute blood loss anemia in June 2022 secondary to a bleeding ulcer  She received 6 U of PRBCs during this admission  TPN was started at that time due to severe malnutrition  She was started on carafate, protonix BID, and cytotec  She has been residing at a facility since this admission       History obtained from chart review and the patient   -------------------------------------------------------------------------------------------------------------  Assessment and Plan:    Neuro:    No active concerns      CV:    Hypotension  o Presumably from septic shock  o See ID  o Received 30 cc/kg bolus in ED, additional 1 L bolus in MICU  o Start levo as needed to maintain MAP>65   HTN   o Not on any antihypertensives per home meds list      Pulm:   No active concerns      GI:    History of gastric bypass/ulcer  o Continue home protonix, carafate, cytotec  o Bariatric diet  o Continue TPN  - Consult to nutrition placed      :    No active concerns      F/E/N:    Maintenance fluids at 50 cc/hr   Replete electrolytes as needed   TPN+Bariatric diet      Heme/Onc:    DVT prophylaxis with heparin      Endo:    No active concerns      ID:    Septic shock  o Unknown source  o Bedside RUQ US shows no signs of cholecystitis  o Follow up UA micro, urine cultures, blood cultures, fungal cultures  o Continue cefepime and vanc  o Continue resuscitation  o Follow up echo to assess for endocarditis  o Tylenol PRN for fevers      MSK/Skin:    Sacral ulcer  o Wound care by nursing  o Frequent turning and repositioning  o OOB as able      Disposition: Admit to Critical Care   Code Status: Level 1 - Full Code  --------------------------------------------------------------------------------------------------------------  Review of Systems    A 12-point, complete review of systems was reviewed and negative except as stated above     Physical Exam  Vitals and nursing note reviewed  Constitutional:       General: She is not in acute distress  Appearance: She is ill-appearing  She is not toxic-appearing  HENT:      Head: Normocephalic and atraumatic  Right Ear: External ear normal       Left Ear: External ear normal       Nose: Nose normal       Mouth/Throat:      Mouth: Mucous membranes are moist    Eyes:      Conjunctiva/sclera: Conjunctivae normal    Cardiovascular:      Rate and Rhythm: Normal rate and regular rhythm  Pulmonary:      Effort: Pulmonary effort is normal       Breath sounds: Normal breath sounds  Abdominal:      General: There is no distension  Palpations: Abdomen is soft  Comments: RUQ tenderness   Musculoskeletal:         General: No deformity  Skin:     General: Skin is warm and dry  Coloration: Skin is not jaundiced or pale  Neurological:      General: No focal deficit present  Mental Status: She is alert and oriented to person, place, and time         --------------------------------------------------------------------------------------------------------------  Vitals:   Vitals:    07/26/22 0345 07/26/22 0415 07/26/22 0423 07/26/22 0427   BP: (!) 81/42 (!) 69/30 (!) 73/37 (!) 69/35   Pulse: 80 76     Resp: (!) 38 (!) 11     Temp:       TempSrc:       SpO2: 99% 98%     Weight:       Height:         Temp  Min: 97 6 °F (36 4 °C)  Max: 103 °F (39 4 °C)     Height: 5' 4" (162 6 cm)  Body mass index is 19 94 kg/m²  N/A    Laboratory and Diagnostics:  Results from last 7 days   Lab Units 07/26/22  0340 07/25/22  2221   WBC Thousand/uL 25 20* 13 81*   HEMOGLOBIN g/dL 6 6* 8 2*   HEMATOCRIT % 20 9* 25 6*   PLATELETS Thousands/uL 299 345   NEUTROS PCT %  --  83*   MONOS PCT %  --  3*     Results from last 7 days   Lab Units 07/25/22  2221   SODIUM mmol/L 131*   POTASSIUM mmol/L 4 4   CHLORIDE mmol/L 105   CO2 mmol/L 20*   ANION GAP mmol/L 6   BUN mg/dL 30*   CREATININE mg/dL 1 16   CALCIUM mg/dL 8 4   GLUCOSE RANDOM mg/dL 124   ALT U/L 30   AST U/L 35   ALK PHOS U/L 100   ALBUMIN g/dL 2 1*   TOTAL BILIRUBIN mg/dL 0 34     Results from last 7 days   Lab Units 07/26/22  0408   MAGNESIUM mg/dL 1 9   PHOSPHORUS mg/dL 3 3      Results from last 7 days   Lab Units 07/25/22  2221   INR  1 20*   PTT seconds 34          Results from last 7 days   Lab Units 07/25/22  2221   LACTIC ACID mmol/L 1 8     ABG:    VBG:    Results from last 7 days   Lab Units 07/25/22  2221   PROCALCITONIN ng/ml 0 60*     Imaging: I have personally reviewed pertinent reports        Historical Information   Past Medical History:   Diagnosis Date    Anemia     Anxiety     B[address] disorder ([facility] [address] )     Colon polyp     Disease of thyroid gland     Essential hypertension     Essential tremor     Fibromyalgia, primary     GERD (gastroesophageal reflux disease)     Inflammatory polyarthropathy (HCC)     Mammogram abnormal     Pressure injury of skin      Past Surgical History:   Procedure Laterality Date    BREAST BIOPSY Right 2015    benign    CARPAL TUNNEL RELEASE Bilateral     COLONOSCOPY      EGD AND COLONOSCOPY  01/01/2014    GASTRIC BYPASS  07/01/2012    MAMMO NEEDLE LOCALIZATION RIGHT (ALL INC) Right 2/6/2012    MAMMO NEEDLE LOCALIZATION RIGHT (ALL INC) Right 2012    ULNAR NERVE TRANSPOSITION Right      Social History   Social History     Substance and Sexual Activity   Alcohol Use Yes    Alcohol/week: 4 0 standard drinks    Types: 4 Glasses of wine per week    Comment: rare     Social History     Substance and Sexual Activity   Drug Use Never     Social History     Tobacco Use   Smoking Status Former Smoker    Quit date:     Years since quittin 5   Smokeless Tobacco Never Used     Family History:   Family History   Problem Relation Age of Onset    No Known Problems Mother     No Known Problems Father     No Known Problems Sister     No Known Problems Maternal Grandmother     No Known Problems Maternal Grandfather     No Known Problems Paternal Grandmother     No Known Problems Paternal Grandfather     No Known Problems Sister     No Known Problems Sister     Stomach cancer Maternal Aunt     No Known Problems Maternal Aunt     No Known Problems Maternal Aunt     No Known Problems Maternal Aunt     No Known Problems Paternal Aunt     Leukemia Other          Medications:  Current Facility-Administered Medications   Medication Dose Route Frequency    acetaminophen (TYLENOL) tablet 650 mg  650 mg Oral Q6H PRN    heparin (porcine) subcutaneous injection 5,000 Units  5,000 Units Subcutaneous Q8H [facility] & longterm    levothyroxine tablet 112 mcg  112 mcg Oral Early Morning    melatonin tablet 6 mg  6 mg Oral HS    miSOPROStol (Cytotec) tablet 200 mcg  200 mcg Oral 4x Daily (with meals and at bedtime)    multi-electrolyte (ISOLYTE-S PH 7 4) bolus 1,000 mL  1,000 mL Intravenous Once    multi-electrolyte (PLASMALYTE-A/ISOLYTE-S PH 7 4) IV solution  50 mL/hr Intravenous Continuous    pantoprazole (PROTONIX) EC tablet 40 mg  40 mg Oral BID AC    sucralfate (CARAFATE) tablet 1 g  1 g Oral 4x Daily (AC & HS)     Home medications:  Prior to Admission Medications   Prescriptions Last Dose Informant Patient Reported? Taking?    clonazePAM (KlonoPIN) 1 mg tablet  Self Yes No   Sig: TK 3 TS PO Q NIGHT   folic acid (FOLVITE) 1 mg tablet   No No   Sig: Take 1 tablet (1 mg total) by mouth daily   levothyroxine 112 mcg tablet  Self Yes No   Sig: TK 1 T PO QD   misoprostol (CYTOTEC) 200 mcg tablet   No No   Sig: Take 1 tablet (200 mcg total) by mouth 4 (four) times a day (with meals and at bedtime)   pantoprazole (PROTONIX) 40 mg tablet   No No   Sig: Take 1 tablet (40 mg total) by mouth 2 (two) times a day before meals   Patient not taking: Reported on 6/16/2022   potassium chloride (K-DUR,KLOR-CON) 20 mEq tablet   No No   Sig: Take 2 tablets (40 mEq total) by mouth 2 (two) times a day   sucralfate (CARAFATE) 1 g tablet   No No   Sig: Take 1 tablet (1 g total) by mouth 4 (four) times a day (before meals and at bedtime)   Patient not taking: Reported on 6/16/2022   thiamine (VITAMIN B1) 100 mg tablet   No No   Sig: Take 1 tablet (100 mg total) by mouth daily   traZODone (DESYREL) 50 mg tablet  Self Yes No   Sig: Take 150 mg by mouth daily at bedtime      Facility-Administered Medications: None     Allergies:  No Known Allergies        James Smith MD        Portions of the record may have been created with voice recognition software  Occasional wrong word or "sound a like" substitutions may have occurred due to the inherent limitations of voice recognition software    Read the chart carefully and recognize, using context, where substitutions have occurred

## 2022-07-26 NOTE — QUICK NOTE
Encountered patient with attending Dr Currie Failing during round  Code status was discussed with patient  Pt stated that she does not want to be kept on life support via intubation, she also doesn't want CPR if her heart failed to function  Pt verbally expressed that if things to happen to her; she would want to go in peace  Her code status is changed to Level 3 DNR/DNI

## 2022-07-26 NOTE — ED ATTENDING ATTESTATION
7/25/2022  Brooke GREGORIO MD, saw and evaluated the patient  I have discussed the patient with the resident/non-physician practitioner and agree with the resident's/non-physician practitioner's findings, Plan of Care, and MDM as documented in the resident's/non-physician practitioner's note, except where noted  All available labs and Radiology studies were reviewed  I was present for key portions of any procedure(s) performed by the resident/non-physician practitioner and I was immediately available to provide assistance  At this point I agree with the current assessment done in the Emergency Department  I have conducted an independent evaluation of this patient a history and physical is as follows:   The patient presents for evaluation fever and chills  The patient lives at a nursing home  Patient has no cough she has had some incontinence of urine she denies abdominal pain she denies headache  Patient also has a small sacral ulceration that does not appear infected clinically  She has not been vomiting  Exam patient is tachycardic with a temperature 103°  HEENT the patient is nontoxic appearing exam mucous membranes are slightly dry   Neck is supple  lungs with crackles at the right base  Heart is tachycardic with a systolic murmur  Abdomen soft nontender  Skin no evidence of cellulitis  Patient moves all extremities appropriately and equally she answers all questions  Impression febrile illness  Plan infectious workup IV fluids lactate procalcitonin blood cultures urine chest x-ray  Pneumonia versus urinary tract infection  IV antibiotics will be administered   COVID swab    ED Course         Critical Care Time  Procedures  The patient presented with a condition in which there was a high probability of imminent or life-threatening deterioration, and critical care services (excluding separately billable procedures and total teaching time ) total  30  minutes

## 2022-07-26 NOTE — PROGRESS NOTES
Vancomycin IV Pharmacy-to-Dose Consultation    Yari Marie is a 76 y o  female who is currently receiving vancomycin IV with management by the Pharmacy Consult service  Relevant clinical data and objective / subjective history reviewed  Vancomycin Assessment:  Indication: SIRS  Status: critically ill  Micro:   7/26 MRSA, blood, fungal, cdiff: in process  Renal Function: serum creatinine continues to increase - 0 86>1 16>1 25  Days of Therapy: 2  Current Dose: 1000 mg IV x1  Goal Trough: 15-20  Goal AUC(24h): 400-600  Last Level: 8 7      Vancomycin Plan:  New Dosing: change to 1000 mg IV x1 followed by 750 mg (15 mg/kg) IV daily PRN when random vancomycin level is less than 20   Next Level: random level 7/27 with AM labs  Renal Function Monitoring: daily BMP and UOP assessment      Pharmacy will continue to follow closely for s/sx of nephrotoxicity, infusion reactions and appropriateness of therapy  BMP and CBC will be ordered per protocol  We will continue to follow the patient's culture results and clinical progress daily         Thank you,  Terri Cornejo, PharmD, [facility] Pharmacist  [phone]

## 2022-07-26 NOTE — ED PROVIDER NOTES
History  Chief Complaint   Patient presents with    Altered Mental Status     Ams and fever at nursing home today  Patient is a 70-year-old female, with a history significant for bipolar disorder, hypertension, inflammatory polyneuropathy, who presents to the ED today, via EMS from [facility], due to fever up to 105 5 F  The symptoms began earlier today  There is reported altered mental status; however, patient is GCS 15 and alert and oriented x4 on arrival   Point of care glucose pre-hospital was within normal limits and patient received 650 acetaminophen via EMS to remit her fever  Currently, patient only complains of chills, polyuria, polydipsia  She denies chest pain, shortness of breath, abdominal pain, headache, weakness, numbness  No clear exacerbating factors  Patient is without other concerns at this time     - No language barrier    - History obtained from patient  - There are no limitations to the history obtained  - Previous charting was reviewed          Prior to Admission Medications   Prescriptions Last Dose Informant Patient Reported? Taking?    clonazePAM (KlonoPIN) 1 mg tablet  Self Yes No   Sig: TK 3 TS PO Q NIGHT   folic acid (FOLVITE) 1 mg tablet   No No   Sig: Take 1 tablet (1 mg total) by mouth daily   levothyroxine 112 mcg tablet  Self Yes No   Sig: TK 1 T PO QD   misoprostol (CYTOTEC) 200 mcg tablet   No No   Sig: Take 1 tablet (200 mcg total) by mouth 4 (four) times a day (with meals and at bedtime)   pantoprazole (PROTONIX) 40 mg tablet   No No   Sig: Take 1 tablet (40 mg total) by mouth 2 (two) times a day before meals   Patient not taking: Reported on 6/16/2022   potassium chloride (K-DUR,KLOR-CON) 20 mEq tablet   No No   Sig: Take 2 tablets (40 mEq total) by mouth 2 (two) times a day   sucralfate (CARAFATE) 1 g tablet   No No   Sig: Take 1 tablet (1 g total) by mouth 4 (four) times a day (before meals and at bedtime)   Patient not taking: Reported on 6/16/2022   thiamine (VITAMIN B1) 100 mg tablet   No No   Sig: Take 1 tablet (100 mg total) by mouth daily   traZODone (DESYREL) 50 mg tablet  Self Yes No   Sig: Take 150 mg by mouth daily at bedtime      Facility-Administered Medications: None       Past Medical History:   Diagnosis Date    Anemia     Anxiety     Bipolar disorder (HCC)     Colon polyp     Disease of thyroid gland     Essential hypertension     Essential tremor     Fibromyalgia, primary     GERD (gastroesophageal reflux disease)     Inflammatory polyarthropathy (HCC)     Mammogram abnormal        Past Surgical History:   Procedure Laterality Date    BREAST BIOPSY Right     benign    CARPAL TUNNEL RELEASE Bilateral     COLONOSCOPY      EGD AND COLONOSCOPY  2014    GASTRIC BYPASS  2012    MAMMO NEEDLE LOCALIZATION RIGHT (ALL INC) Right 2012    MAMMO NEEDLE LOCALIZATION RIGHT (ALL INC) Right 2012    ULNAR NERVE TRANSPOSITION Right        Family History   Problem Relation Age of Onset    No Known Problems Mother     No Known Problems Father     No Known Problems Sister     No Known Problems Maternal Grandmother     No Known Problems Maternal Grandfather     No Known Problems Paternal Grandmother     No Known Problems Paternal Grandfather     No Known Problems Sister     No Known Problems Sister     Stomach cancer Maternal Aunt     No Known Problems Maternal Aunt     No Known Problems Maternal Aunt     No Known Problems Maternal Aunt     No Known Problems Paternal Aunt     Leukemia Other      I have reviewed and agree with the history as documented      E-Cigarette/Vaping    E-Cigarette Use Never User      E-Cigarette/Vaping Substances    Nicotine No     THC No     CBD No     Flavoring No     Other No     Unknown No      Social History     Tobacco Use    Smoking status: Former Smoker     Quit date:      Years since quittin 5    Smokeless tobacco: Never Used   Vaping Use    Vaping Use: Never used Substance Use Topics    Alcohol use: Yes     Alcohol/week: 4 0 standard drinks     Types: 4 Glasses of wine per week     Comment: rare    Drug use: Never        Review of Systems   Constitutional: Positive for chills and fever  HENT: Negative for trouble swallowing  Eyes: Negative for visual disturbance  Respiratory: Negative for shortness of breath  Cardiovascular: Negative for chest pain  Gastrointestinal: Negative for abdominal pain  Endocrine: Positive for polyuria  Genitourinary: Positive for dysuria  Musculoskeletal: Negative for gait problem  Skin: Negative for rash  Allergic/Immunologic: Negative for environmental allergies  Neurological: Negative for headaches  Hematological: Negative for adenopathy  Psychiatric/Behavioral: Negative for confusion  All other systems reviewed and are negative  Physical Exam  ED Triage Vitals   Temperature Pulse Respirations Blood Pressure SpO2   07/25/22 2207 07/25/22 2207 07/25/22 2207 07/25/22 2207 07/25/22 2207   (!) 103 °F (39 4 °C) (!) 120 17 110/55 96 %      Temp Source Heart Rate Source Patient Position - Orthostatic VS BP Location FiO2 (%)   07/25/22 2207 07/25/22 2207 -- -- --   Oral Monitor         Pain Score       07/25/22 2236       Med Not Given for Pain - for MAR use only             Orthostatic Vital Signs  Vitals:    07/26/22 0115 07/26/22 0126 07/26/22 0200 07/26/22 0215   BP: 92/52 101/50 98/51 (!) 95/48   Pulse: 87  90        Physical Exam  Vitals and nursing note reviewed  Exam conducted with a chaperone present  Constitutional:       General: She is not in acute distress  Appearance: She is ill-appearing  She is not toxic-appearing or diaphoretic  Comments: Patient appears comfortable during my evaluation    HENT:      Head: Normocephalic and atraumatic  Right Ear: External ear normal       Left Ear: External ear normal       Nose: Nose normal  No rhinorrhea        Mouth/Throat:      Mouth: Mucous membranes are dry  Pharynx: Oropharynx is clear  No oropharyngeal exudate or posterior oropharyngeal erythema  Eyes:      General: No scleral icterus  Right eye: No discharge  Left eye: No discharge  Conjunctiva/sclera: Conjunctivae normal       Comments: Slight anisocoria   Neck:      Comments: Patient is spontaneously rotating their neck to the left and right during the history and physical exam interaction without difficulty or apparent discomfort    Cardiovascular:      Rate and Rhythm: Regular rhythm  Tachycardia present  Pulses: Normal pulses  Heart sounds: Murmur (Previously documented 2/6 murmur, left lower sternal border) heard  No friction rub  No gallop  Comments: 2+ Radial  Pulmonary:      Effort: Pulmonary effort is normal  No respiratory distress  Breath sounds: No stridor  Rhonchi (Left lower lung field) present  No wheezing or rales  Abdominal:      General: Abdomen is flat  There is no distension  Palpations: Abdomen is soft  Tenderness: There is no abdominal tenderness  There is no right CVA tenderness, left CVA tenderness, guarding or rebound  Genitourinary:     General: Normal vulva  Rectum: Normal       Comments: Stage II sacral pressure ulcer  Musculoskeletal:         General: No tenderness (No calf tenderness to palpation) or deformity  Cervical back: Normal range of motion and neck supple  No rigidity or tenderness  No muscular tenderness  Right lower leg: No edema  Left lower leg: No edema  Lymphadenopathy:      Cervical: No cervical adenopathy  Skin:     General: Skin is warm and dry  Capillary Refill: Capillary refill takes less than 2 seconds  Findings: Lesion present  Neurological:      Mental Status: She is alert  Comments: GCS 15  Alert and oriented x4  Patient is speaking clearly in complete sentences  Patient is answering appropriately and able follow commands    Patient is moving all four extremities spontaneously  No facial droop  Psychiatric:         Mood and Affect: Mood normal          Behavior: Behavior normal          ED Medications  Medications   acetaminophen (FOR EMS ONLY) (TYLENOL) oral suspension 650 mg (0 mg Does not apply Given to EMS 7/25/22 2210)   sodium chloride 0 9 % bolus 1,000 mL (0 mL Intravenous Stopped 7/26/22 0217)   acetaminophen (TYLENOL) tablet 325 mg (325 mg Oral Given 7/25/22 2236)   cefepime (MAXIPIME) 2 g/50 mL dextrose IVPB (0 mg Intravenous Stopped 7/26/22 0020)   vancomycin (VANCOCIN) IVPB (premix in dextrose) 1,000 mg 200 mL (0 mg/kg × 50 3 kg Intravenous Stopped 7/26/22 0117)   multi-electrolyte (ISOLYTE-S PH 7 4) bolus 550 mL (0 mL Intravenous Stopped 7/26/22 0217)       Diagnostic Studies  Results Reviewed     Procedure Component Value Units Date/Time    HS Troponin I 4hr [[id number]] Collected: 07/26/22 0226    Lab Status: In process Specimen: Blood from Arm, Right Updated: 07/26/22 0232    HS Troponin I 2hr [[id number]]  (Normal) Collected: 07/26/22 0034    Lab Status: Final result Specimen: Blood from Arm, Left Updated: 07/26/22 0132     hs TnI 2hr 8 ng/L      Delta 2hr hsTnI 2 ng/L     COVID/FLU/RSV [[id number]]  (Normal) Collected: 07/25/22 2221    Lab Status: Final result Specimen: Nares from Nose Updated: 07/25/22 2323     SARS-CoV-2 Negative     INFLUENZA A PCR Negative     INFLUENZA B PCR Negative     RSV PCR Negative    Narrative:      FOR PEDIATRIC PATIENTS - copy/paste COVID Guidelines URL to browser: [email or web address]    SARS-CoV-2 assay is a Nucleic Acid Amplification assay intended for the  qualitative detection of nucleic acid from SARS-CoV-2 in nasopharyngeal  swabs  Results are for the presumptive identification of SARS-CoV-2 RNA      Positive results are indicative of infection with SARS-CoV-2, the virus  causing COVID-19, but do not rule out bacterial infection or co-infection  with other viruses  Laboratories within the [place] and its  territories are required to report all positive results to the appropriate  public health authorities  Negative results do not preclude SARS-CoV-2  infection and should not be used as the sole basis for treatment or other  patient management decisions  Negative results must be combined with  clinical observations, patient history, and epidemiological information  This test has not been FDA cleared or approved  This test has been authorized by FDA under an Emergency Use Authorization  (EUA)  This test is only authorized for the duration of time the  declaration that circumstances exist justifying the authorization of the  emergency use of an in vitro diagnostic tests for detection of SARS-CoV-2  virus and/or diagnosis of COVID-19 infection under section 564(b)(1) of  the Act, 21 U  S C  551FYR-4(C)(3), unless the authorization is terminated  or revoked sooner  The test has been validated but independent review by FDA  and CLIA is pending  Test performed using [employer] Gene[employer]pert: This RT-PCR assay targets N2,  a region unique to SARS-CoV-2  A conserved region in the E-gene was chosen  for pan-Sarbecovirus detection which includes SARS-CoV-2      UA w Reflex to Microscopic w Reflex to Culture [[id number]] Collected: 07/25/22 2318    Lab Status: No result Specimen: Urine, Straight Cath     Procalcitonin [[id number]]  (Abnormal) Collected: 07/25/22 2221    Lab Status: Final result Specimen: Blood from Arm, Left Updated: 07/25/22 2306     Procalcitonin 0 60 ng/ml     HS Troponin 0hr (reflex protocol) [[id number]]  (Normal) Collected: 07/25/22 2221    Lab Status: Final result Specimen: Blood from Arm, Left Updated: 07/25/22 2305     hs TnI 0hr 6 ng/L     Protime-INR [[id number]]  (Abnormal) Collected: 07/25/22 2221    Lab Status: Final result Specimen: Blood from Arm, Left Updated: 07/25/22 2303     Protime 15 4 seconds      INR 1 20    APTT [[id number]]  (Normal) Collected: 07/25/22 2221    Lab Status: Final result Specimen: Blood from Arm, Left Updated: 07/25/22 2303     PTT 34 seconds     Lactic acid [[id number]]  (Normal) Collected: 07/25/22 2221    Lab Status: Final result Specimen: Blood from Arm, Left Updated: 07/25/22 2258     LACTIC ACID 1 8 mmol/L     Narrative:      Result may be elevated if tourniquet was used during collection  Comprehensive metabolic panel [[id number]]  (Abnormal) Collected: 07/25/22 2221    Lab Status: Final result Specimen: Blood from Arm, Left Updated: 07/25/22 2254     Sodium 131 mmol/L      Potassium 4 4 mmol/L      Chloride 105 mmol/L      CO2 20 mmol/L      ANION GAP 6 mmol/L      BUN 30 mg/dL      Creatinine 1 16 mg/dL      Glucose 124 mg/dL      Calcium 8 4 mg/dL      Corrected Calcium 9 9 mg/dL      AST 35 U/L      ALT 30 U/L      Alkaline Phosphatase 100 U/L      Total Protein 7 6 g/dL      Albumin 2 1 g/dL      Total Bilirubin 0 34 mg/dL      eGFR 44 ml/min/1 73sq m     Narrative:      [place] guidelines for Chronic Kidney Disease (CKD):     Stage 1 with normal or high GFR (GFR > 90 mL/min/1 73 square meters)    Stage 2 Mild CKD (GFR = 60-89 mL/min/1 73 square meters)    Stage 3A Moderate CKD (GFR = 45-59 mL/min/1 73 square meters)    Stage 3B Moderate CKD (GFR = 30-44 mL/min/1 73 square meters)    Stage 4 Severe CKD (GFR = 15-29 mL/min/1 73 square meters)    Stage 5 End Stage CKD (GFR <15 mL/min/1 73 square meters)  Note: GFR calculation is accurate only with a steady state creatinine    Strep Pneumoniae, Urine [[id number]] Collected: 07/25/22 2239    Lab Status: In process Specimen: Urine, Catheter Updated: 07/25/22 2246    Legionella antigen, urine [[id number]] Collected: 07/25/22 2239    Lab Status:  In process Specimen: Urine, Catheter Updated: 07/25/22 2246    CBC and differential [[id number]]  (Abnormal) Collected: 07/25/22 2221    Lab Status: Final result Specimen: Blood from Arm, Left Updated: 07/25/22 2232     WBC 13 81 Thousand/uL      RBC 2 66 Million/uL      Hemoglobin 8 2 g/dL      Hematocrit 25 6 %      MCV 96 fL      MCH 30 8 pg      MCHC 32 0 g/dL      RDW 21 3 %      MPV 10 2 fL      Platelets 136 Thousands/uL      nRBC 0 /100 WBCs      Neutrophils Relative 83 %      Immat GRANS % 2 %      Lymphocytes Relative 9 %      Monocytes Relative 3 %      Eosinophils Relative 3 %      Basophils Relative 0 %      Neutrophils Absolute 11 47 Thousands/µL      Immature Grans Absolute 0 30 Thousand/uL      Lymphocytes Absolute 1 20 Thousands/µL      Monocytes Absolute 0 44 Thousand/µL      Eosinophils Absolute 0 36 Thousand/µL      Basophils Absolute 0 04 Thousands/µL     Blood culture #2 [[id number]] Collected: 07/25/22 2221    Lab Status: In process Specimen: Blood from Arm, Right Updated: 07/25/22 2229    Blood culture #1 [[id number]] Collected: 07/25/22 2221    Lab Status: In process Specimen: Blood from Arm, Left Updated: 07/25/22 2229                 XR chest 1 view portable    (Results Pending)         Procedures  Procedures      ED Course  ED Course as of 07/26/22 0234   Mon Jul 25, 2022 2237 WBC(!): 13 81   Tue Jul 26, 2022   0126 30cc/kg = 1509cc  1550cc ordered  Identification of Seniors at [address] [place] [address] Most Recent Value   (ISAR) Identification of Seniors at Risk    Before the illness or injury that brought you to the Emergency, did you need someone to help you on a regular basis? 1 Filed at: 07/25/2022 2206   In the last 24 hours, have you needed more help than usual? 1 Filed at: 07/25/2022 2206   Have you been hospitalized for one or more nights during the past 6 months? 0 Filed at: 07/25/2022 2206   In general, do you see well? 0 Filed at: 07/25/2022 2206   In general, do you have serious problems with your memory? 1 Filed at: 07/25/2022 2206   Do you take more than three different medications every day?  1 Filed at: 07/25/2022 2206   ISAR Score 4 Filed at: 07/25/2022 4722                 Initial Sepsis Screening     Row Name 07/25/22 7219                Is the patient's history suggestive of a new or worsening infection? Yes (Proceed)  -CL        Suspected source of infection urinary tract infection  -CL        Are two or more of the following signs & symptoms of infection both present and new to the patient? Yes (Proceed)  -CL        Indicate SIRS criteria Hyperthemia > 38 3C (100 9F); Tachycardia > 90 bpm  -CL        If the answer is yes to both questions, suspicion of sepsis is present --        If severe sepsis is present AND tissue hypoperfusion perists in the hour after fluid resuscitation or lactate > 4, the patient meets criteria for SEPTIC SHOCK --        Are any of the following organ dysfunction criteria present within 6 hours of suspected infection and SIRS criteria that are NOT considered to be chronic conditions? No  -CL        Organ dysfunction --        Date of presentation of severe sepsis --        Time of presentation of severe sepsis --        Tissue hypoperfusion persists in the hour after crystalloid fluid administration, evidenced, by either: --        Was hypotension present within one hour of the conclusion of crystalloid fluid administration?  No  -CL        Date of presentation of septic shock --        Time of presentation of septic shock --              User Key  (r) = Recorded By, (t) = Taken By, (c) = Cosigned By    [address] Name Provider Type    CL Brett Chatterjee MD Resident              Default Flowsheet Data (last 720 hours)     Sepsis Reassess     Row Name 07/26/22 0127                   Repeat Volume Status and Tissue Perfusion Assessment Performed    Repeat Volume Status and Tissue Perfusion Assessment Performed Yes  -CL                  Volume Status and Tissue Perfusion Post Fluid Resuscitation * Must Document All *    Vital Signs Reviewed (HR, RR, BP, T) Yes  -CL        Shock Index Reviewed Yes  -CL        Arterial Oxygen Saturation Reviewed (POx, SaO2 or SpO2) Yes (comment %)  97  -CL        Cardio Regular rate and rhythm  -CL        Pulmonary Normal effort  -CL        Capillary Refill Brisk  -CL        Peripheral Pulses Radial  -CL        Peripheral Pulse +2  -CL        Skin Warm;Dry  -CL        Urine output assessed Decreased  -CL                  *OR*   Intensive Monitoring- Must Document One of the Following Four *:    Vital Signs Reviewed --        * Central Venous Pressure (CVP or RAP) --        * Central Venous Oxygen (SVO2, ScvO2 or Oxygen saturation via central catheter) --        * Bedside Cardiovascular US in IVC diameter and % collapse --        * Passive Leg Raise OR Crystalloid Challenge --              User Key  (r) = Recorded By, (t) = Taken By, (c) = Cosigned By    Initials Name Provider Type    CL Rohini Ballard MD Resident              SBIRT 20yo+    Flowsheet Row Most Recent Value   SBIRT (25 yo +)    In order to provide better care to our patients, we are screening all of our patients for alcohol and drug use  Would it be okay to ask you these screening questions? No Filed at: 07/25/2022 2212                MDM  Number of Diagnoses or Management Options  Cardiac murmur  Stage II pressure ulcer of sacral region [facility])  Diagnosis management comments: Patient is a 55-year-old female, with a history significant for bipolar disorder, hypertension, inflammatory polyneuropathy, who presents to the ED today, via EMS from [facility], due to fever up to 105 5 F  The symptoms began earlier today  There is reported altered mental status; however, patient is GCS 15 and alert and oriented x4 on arrival   Point of care glucose pre-hospital was within normal limits and patient received 650 acetaminophen via EMS to remit her fever  Currently, patient only complains of chills, polyuria, polydipsia  She denies chest pain, shortness of breath, abdominal pain, headache, weakness, numbness    Patient is currently afebrile, tachycardic, otherwise hemodynamically stable  Her physical exam is notable for rhonchi in the left lower lung field, tachycardia, stage II sacral pressure ulcer without surrounding signs of infection, cardiac murmur (previously documented)  This presentation is concerning for:  Sepsis, UTI pneumonia, viral syndrome  Low clinical suspicion for meningitis at this time based upon history and physical exam  I also considered type 2 MI  Will investigate with sepsis panel order set, troponin, urine strep, urine Legionella, viral testing  Will manage with wound care, cefepime, vancomycin, fluids, further based on workup  Plan to admit  Disposition  Final diagnoses:   Stage II pressure ulcer of sacral region [facility])   Cardiac murmur   NOAH (acute kidney injury) ([facility] )   Sepsis ([place] [address] )   Fever   Hyponatremia     Time reflects when diagnosis was documented in both MDM as applicable and the Disposition within this note     Time User Action Codes Description Comment    7/25/2022 10:27 PM Espiridion Peter A Add [L89 152] Stage II pressure ulcer of sacral region ([place] [address] )     7/25/2022 10:27 PM Espiridion Peter Add [R01 1] Cardiac murmur     7/25/2022 11:05 PM Espiridion Peter A Add [N17 9] NOAH (acute kidney injury) ([place] [address] )     7/25/2022 11:05 PM Espiridion Peter A Add [A41 9] Sepsis ([place] [address] )     7/25/2022 11:05 PM Espiridion Peter A Modify [L89 152] Stage II pressure ulcer of sacral region ([place] [address] )     7/25/2022 11:05 PM Espiridion Peter A Modify [A41 9] Sepsis ([place] [address] )     7/25/2022 11:05 PM Legare, Adelia Na A Add [R50 9] Fever     7/26/2022 12:26 AM Espiridion Peter A Add [E87 1] Hyponatremia       ED Disposition     ED Disposition   Admit    Condition   Stable    Date/Time   Tue Jul 26, 2022  2:33 AM    Comment   Case was discussed with Critical Care and the patient's admission status was agreed to be Admission Status: inpatient status to the service of Dr Meera Hernadez              Follow-up Information None         Patient's Medications   Discharge Prescriptions    No medications on file     No discharge procedures on file  PDMP Review       Value Time User    PDMP Reviewed  Yes 12/14/2021  2:42 PM Brandin Gallegos MD           ED Provider  Attending physically available and evaluated Pilo Butts I managed the patient along with the ED Attending      Electronically Signed by         Cristhian Zamudio MD  07/26/22 8523

## 2022-07-26 NOTE — SEPSIS NOTE
Sepsis Note   Tracy Domingo 76 y o  female MRN: [id number]  Unit/Bed#: ED 06 Encounter: [id number]       qSOFA     [address] Name 07/25/22 2210 07/25/22 2207             Altered mental status GCS < 15 0 --       Respiratory Rate > / =22 -- 0       Systolic BP < / =966 -- 0       Q Sofa Score 0 0                  Initial Sepsis Screening     Row Name 07/25/22 8932                Is the patient's history suggestive of a new or worsening infection? Yes (Proceed)  -CL        Suspected source of infection urinary tract infection  -CL        Are two or more of the following signs & symptoms of infection both present and new to the patient? Yes (Proceed)  -CL        Indicate SIRS criteria Hyperthemia > 38 3C (100 9F); Tachycardia > 90 bpm  -CL        If the answer is yes to both questions, suspicion of sepsis is present --        If severe sepsis is present AND tissue hypoperfusion perists in the hour after fluid resuscitation or lactate > 4, the patient meets criteria for SEPTIC SHOCK --        Are any of the following organ dysfunction criteria present within 6 hours of suspected infection and SIRS criteria that are NOT considered to be chronic conditions? No  -CL        Organ dysfunction --        Date of presentation of severe sepsis --        Time of presentation of severe sepsis --        Tissue hypoperfusion persists in the hour after crystalloid fluid administration, evidenced, by either: --        Was hypotension present within one hour of the conclusion of crystalloid fluid administration?  No  -CL        Date of presentation of septic shock --        Time of presentation of septic shock --              User Key  (r) = Recorded By, (t) = Taken By, (c) = Cosigned By    [address]  Name Provider Brent Felton MD Resident

## 2022-07-26 NOTE — SEPSIS NOTE
Sepsis Note   Aurora Fraga 76 y o  female MRN: [id number]  [facility] Encounter: [id number]    Being treated for sepsis with vanc and cefepime  Received 30 cc/kg bolus plus additional liter  qSOFA     Row Name 07/26/22 0515 07/26/22 0500 07/26/22 0450 07/26/22 0427 07/26/22 0423    Altered mental status GCS < 15 -- -- -- -- --    Respiratory Rate > / =22 1 0 1 -- --    Systolic BP < / =743 1 1 1 1 1    Q Sofa Score 2 1 2 1 1    Row Name 07/26/22 0415 07/26/22 0345 07/26/22 0315 07/26/22 0215 07/26/22 0200    Altered mental status GCS < 15 -- -- -- -- --    Respiratory Rate > / =22 0 1 -- -- 0    Systolic BP < / =270 1 1 1 1 1    Q Sofa Score 1 2 1 1 1    Row Name 07/26/22 0126 07/26/22 0115 07/26/22 0031 07/25/22 2330 07/25/22 2210    Altered mental status GCS < 15 -- -- -- -- 0    Respiratory Rate > / =22 -- 0 -- -- --    Systolic BP < / =292 0 1 1 1 --    Q Sofa Score 0 1 1 1 0    Row Name 07/25/22 2207                Altered mental status GCS < 15 --        Respiratory Rate > / =34 0        Systolic BP < / =711 0        Q Sofa Score 0                   Initial Sepsis Screening     Row Name 07/26/22 0328 07/25/22 2305             Is the patient's history suggestive of a new or worsening infection? Yes (Proceed)  -IS Yes (Proceed)  -CL       Suspected source of infection pneumonia;urinary tract infection  -IS urinary tract infection  -CL       Are two or more of the following signs & symptoms of infection both present and new to the patient? Yes (Proceed)  -IS Yes (Proceed)  -CL       Indicate SIRS criteria Hyperthemia > 38 3C (100 9F); Leukopenia (WBC < 4000 IJL)  -IS Hyperthemia > 38 3C (100 9F); Tachycardia > 90 bpm  -CL       If the answer is yes to both questions, suspicion of sepsis is present -- --       If severe sepsis is present AND tissue hypoperfusion perists in the hour after fluid resuscitation or lactate > 4, the patient meets criteria for SEPTIC SHOCK -- --       Are any of the following organ dysfunction criteria present within 6 hours of suspected infection and SIRS criteria that are NOT considered to be chronic conditions? No  -IS No  -CL       Organ dysfunction -- --       Date of presentation of severe sepsis -- --       Time of presentation of severe sepsis -- --       Tissue hypoperfusion persists in the hour after crystalloid fluid administration, evidenced, by either: Mean arterial pressure < 65 mm/Hg ( ___ mm Hg in comment field)  -IS --       Was hypotension present within one hour of the conclusion of crystalloid fluid administration?  Yes  -IS No  -CL       Date of presentation of septic shock 07/26/22  -IS --       Time of presentation of septic shock 0329  -IS --             User Key  (r) = Recorded By, (t) = Taken By, (c) = Cosigned By    [address] Name Provider Type    IS Munira Mac MD Resident    Geeta Alvarado MD Resident                  Default Flowsheet Data (last 720 hours)     Sepsis Reassess     Row Name 07/26/22 0546 07/26/22 0127                Repeat Volume Status and Tissue Perfusion Assessment Performed    Repeat Volume Status and Tissue Perfusion Assessment Performed Yes  -IS Yes  -CL                Volume Status and Tissue Perfusion Post Fluid Resuscitation * Must Document All *    Vital Signs Reviewed (HR, RR, BP, T) Yes  -IS Yes  -CL       Shock Index Reviewed Yes  -IS Yes  -CL       Arterial Oxygen Saturation Reviewed (POx, SaO2 or SpO2) -- Yes (comment %)  97  -CL       Cardio Regular rate and rhythm  -IS Regular rate and rhythm  -CL       Pulmonary Normal effort  -IS Normal effort  -CL       Capillary Refill Brisk  -IS Brisk  -CL       Peripheral Pulses Radial  -IS Radial  -CL       Peripheral Pulse -- +2  -CL       Skin Warm  -IS Warm;Dry  -CL       Urine output assessed -- Decreased  -CL                *OR*   Intensive Monitoring- Must Document One of the Following Four *:    Vital Signs Reviewed -- --       * Central Venous Pressure (CVP or RAP) -- --       * Central Venous Oxygen (SVO2, ScvO2 or Oxygen saturation via central catheter) -- --       * Bedside Cardiovascular US in IVC diameter and % collapse -- --       * Passive Leg Raise OR Crystalloid Challenge -- --             User Key  (r) = Recorded By, (t) = Taken By, (c) = Cosigned By    [address] Name Provider Conrad Ryan MD Resident    Brady Alvarenga MD Resident

## 2022-07-26 NOTE — SEPSIS NOTE
Sepsis Note   Pilo Butts 76 y o  female MRN: [id number]  Unit/Bed#: [facility] 13 Encounter: [id number]       qSOFA     [address] Name 07/26/22 0315 07/26/22 0215 07/26/22 0200 07/26/22 0126 07/26/22 0115    Altered mental status GCS < 15 -- -- -- -- --    Respiratory Rate > / =22 -- -- 0 -- 0    Systolic BP < / =149 1 1 1 0 1    Q Sofa Score 1 1 1 0 1    Row Name 07/26/22 0031 07/25/22 2330 07/25/22 2210 07/25/22 2207       Altered mental status GCS < 15 -- -- 0 --     Respiratory Rate > / =22 -- -- -- 0     Systolic BP < / =387 1 1 -- 0     Q Sofa Score 1 1 0 0                Initial Sepsis Screening     Row Name 07/26/22 0328 07/25/22 2305             Is the patient's history suggestive of a new or worsening infection? Yes (Proceed)  -IS Yes (Proceed)  -CL       Suspected source of infection pneumonia;urinary tract infection  -IS urinary tract infection  -CL       Are two or more of the following signs & symptoms of infection both present and new to the patient? Yes (Proceed)  -IS Yes (Proceed)  -CL       Indicate SIRS criteria Hyperthemia > 38 3C (100 9F); Leukopenia (WBC < 4000 IJL)  -IS Hyperthemia > 38 3C (100 9F); Tachycardia > 90 bpm  -CL       If the answer is yes to both questions, suspicion of sepsis is present -- --       If severe sepsis is present AND tissue hypoperfusion perists in the hour after fluid resuscitation or lactate > 4, the patient meets criteria for SEPTIC SHOCK -- --       Are any of the following organ dysfunction criteria present within 6 hours of suspected infection and SIRS criteria that are NOT considered to be chronic conditions?  No  -IS No  -CL       Organ dysfunction -- --       Date of presentation of severe sepsis -- --       Time of presentation of severe sepsis -- --       Tissue hypoperfusion persists in the hour after crystalloid fluid administration, evidenced, by either: Mean arterial pressure < 65 mm/Hg ( ___ mm Hg in comment field)  -IS --       Was hypotension present within one hour of the conclusion of crystalloid fluid administration?  Yes  -IS No  -CL       Date of presentation of septic shock 07/26/22  -IS --       Time of presentation of septic shock 0329  -IS --             User Key  (r) = Recorded By, (t) = Taken By, (c) = Cosigned By    [address] Name Provider Type    IS Estefania Foster MD Resident    Agapito Garcia MD Resident                  Default Flowsheet Data (last 720 hours)     Sepsis Reassess     Row Name 07/26/22 0127                   Repeat Volume Status and Tissue Perfusion Assessment Performed    Repeat Volume Status and Tissue Perfusion Assessment Performed Yes  -CL                  Volume Status and Tissue Perfusion Post Fluid Resuscitation * Must Document All *    Vital Signs Reviewed (HR, RR, BP, T) Yes  -CL        Shock Index Reviewed Yes  -CL        Arterial Oxygen Saturation Reviewed (POx, SaO2 or SpO2) Yes (comment %)  97  -CL        Cardio Regular rate and rhythm  -CL        Pulmonary Normal effort  -CL        Capillary Refill Brisk  -CL        Peripheral Pulses Radial  -CL        Peripheral Pulse +2  -CL        Skin Warm;Dry  -CL        Urine output assessed Decreased  -CL                  *OR*   Intensive Monitoring- Must Document One of the Following Four *:    Vital Signs Reviewed --        * Central Venous Pressure (CVP or RAP) --        * Central Venous Oxygen (SVO2, ScvO2 or Oxygen saturation via central catheter) --        * Bedside Cardiovascular US in IVC diameter and % collapse --        * Passive Leg Raise OR Crystalloid Challenge --              User Key  (r) = Recorded By, (t) = Taken By, (c) = Cosigned By    [address] Name Provider Type    CL Darin Gallardo MD Resident

## 2022-07-26 NOTE — PROGRESS NOTES
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 since June 2022, prior gastric ulcer and upper GIB in June 2022, and prior inflammatory polyneuropathy  She presented from NH for severe fever 105 5  She met sepsis criteria, cultures obtained, crystalloid resuscitation and broad spectrum abx  Persistent hypotension and required NE  She reports feeling cold and unwell for the past week or longer  Noted fever yesterday with rigors, reports possibly having rigors longer, now feeling fatigued but stronger, denied chest pain, no pleurisy, no hemoptysis, no abd pain        VS  0 on presentation, no AF, HR 's, MAPS 50-70's, SpO2 98% RA, I/Os +2 5L  Exam  GEN Frail older woman, in bed, awake, conversant  HEENT MMM, no thrush, temporal wasting  NECK no accessory muscle use, JVD not elevated  CV reg, single s1/2, no m/r  Pulm clear, no wheeze, no rales, no rhonchi  ABD +BS soft NTND, no rebound  EXT poor muscle mass, warm, brisk cap refill, no edema, RUE PICC in place without gross purulence     Laboratory and Diagnostics  Results from last 7 days   Lab Units 07/26/22  0340 07/25/22  2221   WBC Thousand/uL 25 20* 13 81*   HEMOGLOBIN g/dL 6 6* 8 2*   HEMATOCRIT % 20 9* 25 6*   PLATELETS Thousands/uL 299 345   NEUTROS PCT %  --  83*   MONOS PCT %  --  3*     Results from last 7 days   Lab Units 07/26/22  0408 07/25/22  2221   SODIUM mmol/L 134* 131*   POTASSIUM mmol/L 4 0 4 4   CHLORIDE mmol/L 106 105   CO2 mmol/L 21 20*   ANION GAP mmol/L 7 6   BUN mg/dL 31* 30*   CREATININE mg/dL 1 25 1 16   CALCIUM mg/dL 8 0* 8 4   GLUCOSE RANDOM mg/dL 107 124   ALT U/L 37 30   AST U/L 41 35   ALK PHOS U/L 83 100   ALBUMIN g/dL 1 8* 2 1*   TOTAL BILIRUBIN mg/dL 0 35 0 34     Results from last 7 days   Lab Units 07/26/22  0408   MAGNESIUM mg/dL 1 9   PHOSPHORUS mg/dL 3 3      Results from last 7 days   Lab Units 07/25/22  2221   INR  1 20*   PTT seconds 34          Results from last 7 days   Lab Units 07/25/22  2221   LACTIC ACID mmol/L 1 8                     Results from last 7 days   Lab Units 07/25/22  2221   PROCALCITONIN ng/ml 0 60*       MICRO  UA +bld, neg Nit, small LE, occ bact, 4-10WBC  Bld Cx pending  Fungal Cx pending  FLU/RSV/COVID-19 neg  MRSA neg  Strep/legionella ag neg    RADIOGRAPHS - images personally reviewed  CXR - 7/25- right sided PICC in place, no focal infiltrates, no effusions, no PTX    TTE 5/2022 - EF 80%, grade I diastolic dysfunction      Assessment  1  Septic Shock/Sepsis POA - WBC/HR/RR/Temp  2  Concern for bacteremia given TPN/PICC line in place with sepsis on admission  3  Severe protein calorie malnutrition with chronic TPN post remote gastric bypas  4  Anemia with history gastric ulcer and GIB  5  Diarrhea  6  Hyponatremia  7  Sacral wound POA  8   Mild NOAH    PLAN  · NEURO - no active issues, delirium precautions  · CARDIAC - goal MAP >65, wean off NE, trend end points, check TTE now  · PULM - no active issues  · GI - noted prior gastric ulcer in June - no evidence active bleeding change protonix to 40mg BID, continue carafate, continue misoprostolol for now, CLD for now and trend CBC, hold further TPN for now  · RENAL - trend renal function, continue isolyte 75/hr, BID BMP panels with mag/phos  · ID - remove PICC line now, continue vanc/cefepime, follow up fungal cultures and remainder of cultures, check Cdiff and stool culture  · ENDO - cont synthroid  · HEME - transfuse for Hgb >7  · Wound care, alert bariatrics of her admission  · ICU Care - SCDs alone, PPI BID  · Discussed code status and she desire no CPR or ACLS resuscitation and no intubation/MV support in the event of further clinical decline - DNAR Level III    Critical Care time excluding procedures, teaching and updates is 37 minutes    Daylin Morin DO

## 2022-07-27 PROBLEM — R78.81 GRAM-POSITIVE BACTEREMIA: Status: ACTIVE | Noted: 2022-07-27

## 2022-07-27 LAB
ABO GROUP BLD BPU: NORMAL
ANION GAP SERPL CALCULATED.3IONS-SCNC: 6 MMOL/L (ref 4–13)
BASOPHILS # BLD AUTO: 0.05 THOUSANDS/ΜL (ref 0–0.1)
BASOPHILS NFR BLD AUTO: 0 % (ref 0–1)
BPU ID: NORMAL
BUN SERPL-MCNC: 21 MG/DL (ref 5–25)
C DIFF TOX GENS STL QL NAA+PROBE: NEGATIVE
CALCIUM SERPL-MCNC: 8.3 MG/DL (ref 8.3–10.1)
CAMPYLOBACTER DNA SPEC NAA+PROBE: NORMAL
CHLORIDE SERPL-SCNC: 108 MMOL/L (ref 96–108)
CO2 SERPL-SCNC: 21 MMOL/L (ref 21–32)
CREAT SERPL-MCNC: 0.99 MG/DL (ref 0.6–1.3)
CROSSMATCH: NORMAL
EOSINOPHIL # BLD AUTO: 0.75 THOUSAND/ΜL (ref 0–0.61)
EOSINOPHIL NFR BLD AUTO: 6 % (ref 0–6)
ERYTHROCYTE [DISTWIDTH] IN BLOOD BY AUTOMATED COUNT: 22.9 % (ref 11.6–15.1)
GFR SERPL CREATININE-BSD FRML MDRD: 54 ML/MIN/1.73SQ M
GLUCOSE SERPL-MCNC: 89 MG/DL (ref 65–140)
HCT VFR BLD AUTO: 23.9 % (ref 34.8–46.1)
HGB BLD-MCNC: 7.7 G/DL (ref 11.5–15.4)
IMM GRANULOCYTES # BLD AUTO: 0.09 THOUSAND/UL (ref 0–0.2)
IMM GRANULOCYTES NFR BLD AUTO: 1 % (ref 0–2)
LYMPHOCYTES # BLD AUTO: 2.44 THOUSANDS/ΜL (ref 0.6–4.47)
LYMPHOCYTES NFR BLD AUTO: 18 % (ref 14–44)
MAGNESIUM SERPL-MCNC: 2.2 MG/DL (ref 1.6–2.6)
MCH RBC QN AUTO: 30 PG (ref 26.8–34.3)
MCHC RBC AUTO-ENTMCNC: 32.2 G/DL (ref 31.4–37.4)
MCV RBC AUTO: 93 FL (ref 82–98)
MONOCYTES # BLD AUTO: 0.94 THOUSAND/ΜL (ref 0.17–1.22)
MONOCYTES NFR BLD AUTO: 7 % (ref 4–12)
MRSA NOSE QL CULT: NORMAL
NEUTROPHILS # BLD AUTO: 9 THOUSANDS/ΜL (ref 1.85–7.62)
NEUTS SEG NFR BLD AUTO: 68 % (ref 43–75)
NRBC BLD AUTO-RTO: 0 /100 WBCS
PHOSPHATE SERPL-MCNC: 3.2 MG/DL (ref 2.3–4.1)
PLATELET # BLD AUTO: 281 THOUSANDS/UL (ref 149–390)
PMV BLD AUTO: 10.3 FL (ref 8.9–12.7)
POTASSIUM SERPL-SCNC: 3.6 MMOL/L (ref 3.5–5.3)
RBC # BLD AUTO: 2.57 MILLION/UL (ref 3.81–5.12)
SALMONELLA DNA SPEC QL NAA+PROBE: NORMAL
SHIGA TOXIN STX GENE SPEC NAA+PROBE: NORMAL
SHIGELLA DNA SPEC QL NAA+PROBE: NORMAL
SODIUM SERPL-SCNC: 135 MMOL/L (ref 135–147)
UNIT DISPENSE STATUS: NORMAL
UNIT PRODUCT CODE: NORMAL
UNIT PRODUCT VOLUME: 350 ML
UNIT RH: NORMAL
VANCOMYCIN SERPL-MCNC: 16 UG/ML (ref 10–20)
WBC # BLD AUTO: 13.27 THOUSAND/UL (ref 4.31–10.16)

## 2022-07-27 PROCEDURE — 84100 ASSAY OF PHOSPHORUS: CPT

## 2022-07-27 PROCEDURE — 80202 ASSAY OF VANCOMYCIN: CPT | Performed by: INTERNAL MEDICINE

## 2022-07-27 PROCEDURE — 97163 PT EVAL HIGH COMPLEX 45 MIN: CPT

## 2022-07-27 PROCEDURE — 83735 ASSAY OF MAGNESIUM: CPT

## 2022-07-27 PROCEDURE — 85025 COMPLETE CBC W/AUTO DIFF WBC: CPT

## 2022-07-27 PROCEDURE — 99223 1ST HOSP IP/OBS HIGH 75: CPT | Performed by: INTERNAL MEDICINE

## 2022-07-27 PROCEDURE — 80048 BASIC METABOLIC PNL TOTAL CA: CPT

## 2022-07-27 PROCEDURE — 99233 SBSQ HOSP IP/OBS HIGH 50: CPT | Performed by: INTERNAL MEDICINE

## 2022-07-27 PROCEDURE — 97167 OT EVAL HIGH COMPLEX 60 MIN: CPT

## 2022-07-27 PROCEDURE — C9113 INJ PANTOPRAZOLE SODIUM, VIA: HCPCS

## 2022-07-27 RX ORDER — VANCOMYCIN HYDROCHLORIDE 1 G/200ML
20 INJECTION, SOLUTION INTRAVENOUS EVERY 24 HOURS
Status: DISCONTINUED | OUTPATIENT
Start: 2022-07-27 | End: 2022-07-27

## 2022-07-27 RX ORDER — ALBUMIN, HUMAN INJ 5% 5 %
12.5 SOLUTION INTRAVENOUS ONCE
Status: COMPLETED | OUTPATIENT
Start: 2022-07-27 | End: 2022-07-27

## 2022-07-27 RX ORDER — VANCOMYCIN HYDROCHLORIDE 1 G/200ML
1000 INJECTION, SOLUTION INTRAVENOUS EVERY 24 HOURS
Status: DISCONTINUED | OUTPATIENT
Start: 2022-07-28 | End: 2022-07-29

## 2022-07-27 RX ORDER — POTASSIUM CHLORIDE 14.9 MG/ML
20 INJECTION INTRAVENOUS
Status: DISCONTINUED | OUTPATIENT
Start: 2022-07-27 | End: 2022-07-27

## 2022-07-27 RX ORDER — POTASSIUM CHLORIDE 20 MEQ/1
20 TABLET, EXTENDED RELEASE ORAL ONCE
Status: COMPLETED | OUTPATIENT
Start: 2022-07-27 | End: 2022-07-27

## 2022-07-27 RX ORDER — CEFAZOLIN SODIUM 2 G/50ML
2000 SOLUTION INTRAVENOUS EVERY 8 HOURS
Status: DISCONTINUED | OUTPATIENT
Start: 2022-07-27 | End: 2022-07-27

## 2022-07-27 RX ORDER — MIDODRINE HYDROCHLORIDE 5 MG/1
5 TABLET ORAL
Status: DISCONTINUED | OUTPATIENT
Start: 2022-07-27 | End: 2022-08-09 | Stop reason: HOSPADM

## 2022-07-27 RX ORDER — ONDANSETRON 2 MG/ML
4 INJECTION INTRAMUSCULAR; INTRAVENOUS EVERY 8 HOURS PRN
Status: DISCONTINUED | OUTPATIENT
Start: 2022-07-27 | End: 2022-08-09 | Stop reason: HOSPADM

## 2022-07-27 RX ORDER — CLONAZEPAM 1 MG/1
3 TABLET ORAL
Status: DISCONTINUED | OUTPATIENT
Start: 2022-07-27 | End: 2022-08-09 | Stop reason: HOSPADM

## 2022-07-27 RX ADMIN — SUCRALFATE 1 G: 1 TABLET ORAL at 07:52

## 2022-07-27 RX ADMIN — ONDANSETRON 4 MG: 2 INJECTION INTRAMUSCULAR; INTRAVENOUS at 11:18

## 2022-07-27 RX ADMIN — MIDODRINE HYDROCHLORIDE 5 MG: 5 TABLET ORAL at 19:21

## 2022-07-27 RX ADMIN — SUCRALFATE 1 G: 1 TABLET ORAL at 11:14

## 2022-07-27 RX ADMIN — CLONAZEPAM 3 MG: 1 TABLET ORAL at 21:10

## 2022-07-27 RX ADMIN — MISOPROSTOL 200 MCG: 200 TABLET ORAL at 17:30

## 2022-07-27 RX ADMIN — POTASSIUM CHLORIDE 20 MEQ: 1500 TABLET, EXTENDED RELEASE ORAL at 11:14

## 2022-07-27 RX ADMIN — POTASSIUM CHLORIDE 20 MEQ: 14.9 INJECTION, SOLUTION INTRAVENOUS at 09:09

## 2022-07-27 RX ADMIN — MISOPROSTOL 200 MCG: 200 TABLET ORAL at 21:10

## 2022-07-27 RX ADMIN — PANTOPRAZOLE SODIUM 40 MG: 40 INJECTION, POWDER, FOR SOLUTION INTRAVENOUS at 21:10

## 2022-07-27 RX ADMIN — PANTOPRAZOLE SODIUM 40 MG: 40 INJECTION, POWDER, FOR SOLUTION INTRAVENOUS at 08:06

## 2022-07-27 RX ADMIN — TRAZODONE HYDROCHLORIDE 150 MG: 100 TABLET ORAL at 21:10

## 2022-07-27 RX ADMIN — SUCRALFATE 1 G: 1 TABLET ORAL at 17:30

## 2022-07-27 RX ADMIN — LEVOTHYROXINE SODIUM 112 MCG: 112 TABLET ORAL at 05:31

## 2022-07-27 RX ADMIN — MISOPROSTOL 200 MCG: 200 TABLET ORAL at 07:51

## 2022-07-27 RX ADMIN — CEFEPIME HYDROCHLORIDE 2000 MG: 2 INJECTION, POWDER, FOR SOLUTION INTRAVENOUS at 10:16

## 2022-07-27 RX ADMIN — Medication 6 MG: at 21:10

## 2022-07-27 RX ADMIN — ALBUMIN (HUMAN) 12.5 G: 12.5 INJECTION, SOLUTION INTRAVENOUS at 12:46

## 2022-07-27 RX ADMIN — VANCOMYCIN HYDROCHLORIDE 1000 MG: 1 INJECTION, SOLUTION INTRAVENOUS at 10:47

## 2022-07-27 RX ADMIN — SUCRALFATE 1 G: 1 TABLET ORAL at 21:10

## 2022-07-27 RX ADMIN — MISOPROSTOL 200 MCG: 200 TABLET ORAL at 11:14

## 2022-07-27 NOTE — PROGRESS NOTES
Vancomycin IV Pharmacy-to-Dose Consultation    Meryle Greet is a 76 y o  female who is currently receiving vancomycin IV with management by the Pharmacy Consult service  Relevant clinical data and objective / subjective history reviewed  Vancomycin Assessment:  Indication: bacteremia  Status: critically ill  Micro:   7/25 Blood cx: GPC in clusters; BCID: Staphylococcus epidermidis  Renal Function: improved, serum creatinine 0 99 today; UOP 2 7 mL/kg/hr  Days of Therapy: 2  Current Dose: 1000 mg IV x2 doses  Goal Trough: 15-20  Goal AUC(24h): 400-600  Last Level: 16      Vancomycin Plan:  New Dosing: change to 1000 mg IV q24h  Next Level: trough 7/29 at 0830  Renal Function Monitoring: daily BMP and UOP assessment      Pharmacy will continue to follow closely for s/sx of nephrotoxicity, infusion reactions and appropriateness of therapy  BMP and CBC will be ordered per protocol  We will continue to follow the patient's culture results and clinical progress daily         Thank you,  Emperatriz Otoole, PharmD, [facility] Pharmacist  [phone]

## 2022-07-27 NOTE — PROGRESS NOTES
Daily Progress - Critical Care   Vandana Burgos 76 y o  female MRN: [id number]  Unit/Bed#: [facility] Encounter: [id number]      -------------------------------------------------------------------------------------------------------------  Chief Complaint: fever    History of Present Illness     Vandana Burgos is a 76 y o  female with a PMH significant for bipolar disorder, HTN, inflammatory polyneuropathy, previous Savage-en-Y gastric bypass currently on TPN, who presented to the ED on 7/25 from a facility with a fever of 105 5F  TPN was started at that time due to severe malnutrition  Transferred to ICU for hemodinamically unstable, require pressor support  Subjective/OVN  Patient didn't sleep well, did have small bowel of loose stool early in the night, no BM this morning  She didn't sleep well despite 1 dose of 0 25 mg Ativan  Pt endorsed taking 3 Klonopin and 1 Trazodone nightly for sleep  She was off Levo overnight, however became slightly hypotensive this AM    -------------------------------------------------------------------------------------------------------------  Assessment and Plan:  1  Septic Shock/Sepsis POA - WBC/HR/RR/Temp  2  Gram positive bacteremia given TPN/PICC line in place with sepsis on admission  3  Severe protein calorie malnutrition with chronic TPN post remote gastric bypas  4  Anemia with history gastric ulcer and GIB  5  Diarrhea  6  Hyponatremia  7  Sacral wound POA  8   Mild NOAH    Neuro:    No active concerns  o Continue with Melatonin   o CAM- ICU   o Will evaluate if Klonopin      CV:    Hypotension  o Presumably from septic shock  o See ID  o Received 30 cc/kg bolus in ED, additional 1 L bolus in MICU  o Start levo as needed to maintain MAP>65  - Weaned off Levo night of 7/26, became hypotensive once again   - Echo: 75 %, normal SF, DF  - Consider low dose of Midodrine    HTN   o Not on any antihypertensives per home meds list      Pulm:   No active concerns      GI:    History of gastric bypass/ulcer  o Continue home protonix, carafate, cytotec  o Bariatric diet - Alerted Bariatric team of patient ICU admission   o BID Q12H for concern of refeeding syndrome   - Can do daily if stable       :    No active concerns      F/E/N:    Maintenance fluids at 75 cc/hr    Replete electrolytes as needed        Heme/Onc:    DVT prophylaxis with heparin   Hgb stable, consider adding Ferrous sulfate for Iron d/f anemia   o Iron panel 6/2022 showed d/f       Endo:    No active concerns      ID:    Gram positive Bacteremia   o Unknown source - On low dose Levo   o Bedside RUQ US shows no signs of cholecystitis  o Follow up UA micro, urine cultures, blood cultures, fungal cultures  - BC x 2: Staph Epidermidis / Gram positive cocci in clusters  - C diff pending   o Continue cefepime and vanc (started 7/25)   o Tylenol PRN for fevers  o Consider consulting ID for gram positive bacteremia if other       MSK/Skin:    Sacral ulcer  o Wound care by nursing  o Frequent turning and repositioning  o OOB as able      Disposition: Admit to Critical Care   Code Status: Level 3 - DNAR and DNI  --------------------------------------------------------------------------------------------------------------  Review of Systems   Constitutional: Negative for chills  HENT: Negative for ear pain and sore throat  Eyes: Negative for pain and visual disturbance  Respiratory: Negative for cough and shortness of breath  Cardiovascular: Negative for chest pain and palpitations  Gastrointestinal: Negative for abdominal pain and vomiting  Genitourinary: Negative for dysuria and hematuria  Musculoskeletal: Negative for arthralgias and back pain  Skin: Negative for color change and rash  Neurological: Negative for seizures and syncope  Psychiatric/Behavioral: Negative for agitation  The patient is nervous/anxious  All other systems reviewed and are negative        A 12-point, complete review of systems was reviewed and negative except as stated above     Physical Exam  Vitals and nursing note reviewed  Constitutional:       General: She is not in acute distress  Appearance: She is not ill-appearing or toxic-appearing  HENT:      Head: Normocephalic and atraumatic  Right Ear: External ear normal       Left Ear: External ear normal       Nose: Nose normal       Mouth/Throat:      Mouth: Mucous membranes are moist    Eyes:      Conjunctiva/sclera: Conjunctivae normal    Cardiovascular:      Rate and Rhythm: Normal rate and regular rhythm  Heart sounds: Normal heart sounds  Pulmonary:      Effort: Pulmonary effort is normal       Breath sounds: Normal breath sounds  Abdominal:      General: There is no distension  Palpations: Abdomen is soft  Comments: RUQ tenderness   Musculoskeletal:         General: No deformity  Skin:     General: Skin is warm and dry  Coloration: Skin is not jaundiced or pale  Neurological:      General: No focal deficit present  Mental Status: She is alert and oriented to person, place, and time  --------------------------------------------------------------------------------------------------------------  Vitals:   Vitals:    07/27/22 0501 07/27/22 0601 07/27/22 0626 07/27/22 0701   BP: (!) 99/45 (!) 91/45 (!) 95/46 (!) 95/42   Pulse: 84 74 80 80   Resp: (!) 37 (!) 28 (!) 26 (!) 31   Temp: 98 9 °F (37 2 °C)      TempSrc: Oral      SpO2: 93% 91% 92% 92%   Weight:       Height:         Temp  Min: 97 4 °F (36 3 °C)  Max: 103 3 °F (39 6 °C)     Height: 5' 4" (162 6 cm)  Body mass index is 19 91 kg/m²    N/A    Laboratory and Diagnostics:  Results from last 7 days   Lab Units 07/27/22  0546 07/26/22  1225 07/26/22  0340 07/25/22  2221   WBC Thousand/uL 13 27* 22 15* 25 20* 13 81*   HEMOGLOBIN g/dL 7 7* 7 6* 6 6* 8 2*   HEMATOCRIT % 23 9* 23 1* 20 9* 25 6*   PLATELETS Thousands/uL 281 284 299 345   NEUTROS PCT % 68 83*  --  83*   MONOS PCT % 7 6  --  3*     Results from last 7 days   Lab Units 07/26/22  1901 07/26/22  0408 07/25/22  2221   SODIUM mmol/L 134* 134* 131*   POTASSIUM mmol/L 3 8 4 0 4 4   CHLORIDE mmol/L 107 106 105   CO2 mmol/L 19* 21 20*   ANION GAP mmol/L 8 7 6   BUN mg/dL 25 31* 30*   CREATININE mg/dL 1 20 1 25 1 16   CALCIUM mg/dL 8 2* 8 0* 8 4   GLUCOSE RANDOM mg/dL 110 107 124   ALT U/L  --  37 30   AST U/L  --  41 35   ALK PHOS U/L  --  83 100   ALBUMIN g/dL  --  1 8* 2 1*   TOTAL BILIRUBIN mg/dL  --  0 35 0 34     Results from last 7 days   Lab Units 07/26/22  1901 07/26/22  0408   MAGNESIUM mg/dL 2 3 1 9   PHOSPHORUS mg/dL 3 6 3 3      Results from last 7 days   Lab Units 07/25/22  2221   INR  1 20*   PTT seconds 34          Results from last 7 days   Lab Units 07/25/22  2221   LACTIC ACID mmol/L 1 8     ABG:    VBG:    Results from last 7 days   Lab Units 07/25/22  2221   PROCALCITONIN ng/ml 0 60*     Imaging: I have personally reviewed pertinent reports        Historical Information   Past Medical History:   Diagnosis Date    Anemia     Anxiety     Bipolar disorder ([address] )     Colon polyp     Disease of thyroid gland     Essential hypertension     Essential tremor     Fibromyalgia, primary     GERD (gastroesophageal reflux disease)     Inflammatory polyarthropathy (HCC)     Mammogram abnormal     Pressure injury of skin      Past Surgical History:   Procedure Laterality Date    BREAST BIOPSY Right 2015    benign    CARPAL TUNNEL RELEASE Bilateral     COLONOSCOPY      EGD AND COLONOSCOPY  01/01/2014    GASTRIC BYPASS  07/01/2012    MAMMO NEEDLE LOCALIZATION RIGHT (ALL INC) Right 2/6/2012    MAMMO NEEDLE LOCALIZATION RIGHT (ALL INC) Right 2/6/2012    ULNAR NERVE TRANSPOSITION Right      Social History   Social History     Substance and Sexual Activity   Alcohol Use Yes    Alcohol/week: 4 0 standard drinks    Types: 4 Glasses of wine per week    Comment: rare     Social History Substance and Sexual Activity   Drug Use Never     Social History     Tobacco Use   Smoking Status Former Smoker    Quit date: 12    Years since quittin 5   Smokeless Tobacco Never Used     Family History:   Family History   Problem Relation Age of Onset    No Known Problems Mother     No Known Problems Father     No Known Problems Sister     No Known Problems Maternal Grandmother     No Known Problems Maternal Grandfather     No Known Problems Paternal Grandmother     No Known Problems Paternal Grandfather     No Known Problems Sister     No Known Problems Sister     Stomach cancer Maternal Aunt     No Known Problems Maternal Aunt     No Known Problems Maternal Aunt     No Known Problems Maternal Aunt     No Known Problems Paternal Aunt     Leukemia Other          Medications:  Current Facility-Administered Medications   Medication Dose Route Frequency    acetaminophen (TYLENOL) tablet 650 mg  650 mg Oral Q6H PRN    cefepime (MAXIPIME) 2,000 mg in dextrose 5 % 50 mL IVPB  2,000 mg Intravenous Q12H    levothyroxine tablet 112 mcg  112 mcg Oral Early Morning    LORazepam (ATIVAN) tablet 0 25 mg  0 25 mg Oral HS    melatonin tablet 6 mg  6 mg Oral HS    miSOPROStol (Cytotec) tablet 200 mcg  200 mcg Oral 4x Daily (with meals and at bedtime)    multi-electrolyte (PLASMALYTE-A/ISOLYTE-S PH 7 4) IV solution  75 mL/hr Intravenous Continuous    norepinephrine (LEVOPHED) 4 mg (STANDARD CONCENTRATION) IV in sodium chloride 0 9% 250 mL  1-30 mcg/min Intravenous Titrated    pantoprazole (PROTONIX) injection 40 mg  40 mg Intravenous Q12H RHONA    sucralfate (CARAFATE) tablet 1 g  1 g Oral 4x Daily (AC & HS)    vancomycin (VANCOCIN) IVPB (premix in dextrose) 750 mg 150 mL  15 mg/kg Intravenous Daily PRN     Home medications:  Prior to Admission Medications   Prescriptions Last Dose Informant Patient Reported? Taking?    clonazePAM (KlonoPIN) 1 mg tablet  Self Yes No   Sig: TK 3 TS PO Q NIGHT folic acid (FOLVITE) 1 mg tablet   No No   Sig: Take 1 tablet (1 mg total) by mouth daily   levothyroxine 112 mcg tablet  Self Yes No   Sig: TK 1 T PO QD   misoprostol (CYTOTEC) 200 mcg tablet   No No   Sig: Take 1 tablet (200 mcg total) by mouth 4 (four) times a day (with meals and at bedtime)   pantoprazole (PROTONIX) 40 mg tablet   No No   Sig: Take 1 tablet (40 mg total) by mouth 2 (two) times a day before meals   Patient not taking: Reported on 6/16/2022   potassium chloride (K-DUR,KLOR-CON) 20 mEq tablet   No No   Sig: Take 2 tablets (40 mEq total) by mouth 2 (two) times a day   sucralfate (CARAFATE) 1 g tablet   No No   Sig: Take 1 tablet (1 g total) by mouth 4 (four) times a day (before meals and at bedtime)   Patient not taking: Reported on 6/16/2022   thiamine (VITAMIN B1) 100 mg tablet   No No   Sig: Take 1 tablet (100 mg total) by mouth daily   traZODone (DESYREL) 50 mg tablet  Self Yes No   Sig: Take 150 mg by mouth daily at bedtime      Facility-Administered Medications: None     Allergies:  No Known Allergies        Celestine Post DO        Portions of the record may have been created with voice recognition software  Occasional wrong word or "sound a like" substitutions may have occurred due to the inherent limitations of voice recognition software    Read the chart carefully and recognize, using context, where substitutions have occurred

## 2022-07-27 NOTE — UTILIZATION REVIEW
Initial Clinical Review    Admission: Date/Time/Statement:   Admission Orders (From admission, onward)     Ordered        07/26/22 0234  INPATIENT ADMISSION  Once                      Orders Placed This Encounter   Procedures    INPATIENT ADMISSION     Standing Status:   Standing     Number of Occurrences:   1     Order Specific Question:   Level of Care     Answer:   Critical Care [15]     Order Specific Question:   Estimated length of stay     Answer:   More than 2 Midnights     Order Specific Question:   Certification     Answer:   I certify that inpatient services are medically necessary for this patient for a duration of greater than two midnights  See H&P and MD Progress Notes for additional information about the patient's course of treatment  ED Arrival Information     Expected   -    Arrival   7/25/2022 22:04    Acuity   Urgent            Means of arrival   Ambulance    Escorted by   [facility] [address] [facility]    Service   Critical Care/ICU    Admission type   Urgent            Arrival complaint   Fever           Chief Complaint   Patient presents with    Altered Mental Status     Ams and fever at nursing home today  Initial Presentation: 76 y o  female who presented from SNF by EMS to [facility] ED  Inpatient admission for evaluation and treatment of sepsis  PMHx: Anemia, Anxiety, Bipolar disorder, 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, HTN, Fibromyalgia, GERD  Presented w/ fever of 105 5F per facility, noted polyuria, polydipsia, chills  On exam, RUQ tenderness, ill-appearing, hypotensive, S2 sacral pressure injury  Imaging unremarkable  Plan: IVF, levo gtt for MAP > 65, continue home meds, TPN, Trend labs, replete electrolytes as needed; IV ABX, echo, wound care, frequent repositioning  Date: 07/27/22   Day 2: Pt had episode of loose stool  Report not sleeping well, despite dose of Ativan; notes taking "3 Klonopin and 1 Trazodone" to get to sleep at night   Was weaned off levo gtt overnight, but became slightly hypotensive this morning  On exam, RUQ tenderness  Plan: considering low dose midodrine, IV levo gtt as needed, IVF, gram positive bacteremia w/ unknown source continue IV ABX, wound care, frequent repositioning       ED Triage Vitals   Temperature Pulse Respirations Blood Pressure SpO2   07/25/22 2207 07/25/22 2207 07/25/22 2207 07/25/22 2207 07/25/22 2207   (!) 103 °F (39 4 °C) (!) 120 17 110/55 96 %      Temp Source Heart Rate Source Patient Position - Orthostatic VS BP Location FiO2 (%)   07/25/22 2207 07/25/22 2207 07/26/22 0845 07/26/22 0845 --   Oral Monitor Sitting Left arm       Pain Score       07/25/22 2236       Med Not Given for Pain - for MAR use only          Wt Readings from Last 1 Encounters:   07/26/22 52 6 kg (116 lb)     Additional Vital Signs:   Date/Time Temp Pulse Resp BP MAP (mmHg) SpO2   07/27/22 0701 -- 80 31 Abnormal  95/42 Abnormal  55 Abnormal  92 %   07/27/22 0626 -- 80 26 Abnormal  95/46 Abnormal  59 Abnormal  92 %   07/27/22 0601 -- 74 28 Abnormal  91/45 Abnormal  55 Abnormal  91 %   07/27/22 0501 98 9 °F (37 2 °C) 84 37 Abnormal  99/45 Abnormal  60 Abnormal  93 %   07/27/22 0321 -- 84 25 Abnormal  102/45 Abnormal  63 Abnormal  90 %   07/27/22 0301 -- 86 26 Abnormal  98/43 Abnormal  53 Abnormal  90 %   07/27/22 0244 -- 84 25 Abnormal  90/44 Abnormal  59 Abnormal  88 % Abnormal    07/27/22 0144 -- 100 40 Abnormal  119/83 103 86 % Abnormal    07/27/22 0114 -- 84 25 Abnormal  102/47 Abnormal  59 Abnormal  87 % Abnormal    07/27/22 0044 -- 88 36 Abnormal  101/49 Abnormal  59 Abnormal  93 %   07/27/22 0014 99 9 °F (37 7 °C) -- -- -- -- --   07/26/22 2344 -- 80 27 Abnormal  104/48 Abnormal  61 Abnormal  91 %   07/26/22 2144 -- 84 39 Abnormal  94/52 64 Abnormal  96 %   07/26/22 2015 98 7 °F (37 1 °C) 76 33 Abnormal  100/46 Abnormal  64 Abnormal  96 %   07/26/22 1600 97 7 °F (36 5 °C) 76 33 Abnormal  120/64 80 94 %   07/26/22 1400 -- 82 29 Abnormal  110/56 72 92 %   07/26/22 1200 103 3 °F (39 6 °C) Abnormal  102 40 Abnormal  113/51 70 93 %   07/26/22 1000 -- 90 14 112/50 67 98 %   07/26/22 0845 98 4 °F (36 9 °C) 103 29 Abnormal  114/51 -- --   07/26/22 0800 98 3 °F (36 8 °C) 96 35 Abnormal  125/50 75 98 %   07/26/22 0715 -- 96 50 Abnormal  119/52 72 97 %   07/26/22 0703 98 °F (36 7 °C) 100 20 119/52 -- --   07/26/22 0700 -- 92 46 Abnormal  106/42 Abnormal  65 99 %   07/26/22 0645 -- 88 48 Abnormal  96/43 Abnormal  63 Abnormal  99 %   07/26/22 0642 97 4 °F (36 3 °C) Abnormal  87 20 96/43 Abnormal  -- --   07/26/22 0620 -- 100 41 Abnormal  110/56 85 99 %   07/26/22 0615 -- 82 23 Abnormal  87/40 Abnormal  50 Abnormal  99 %   07/26/22 0550 -- 76 18 80/41 Abnormal  51 Abnormal  100 %   07/26/22 0545 -- 76 21 81/36 Abnormal  49 Abnormal  98 %   07/26/22 0515 -- 78 37 Abnormal  76/40 Abnormal  51 Abnormal  100 %   07/26/22 0500 97 4 °F (36 3 °C) Abnormal  76 19 78/40 Abnormal  57 Abnormal  100 %   07/26/22 0450 -- 76 31 Abnormal  77/35 Abnormal  39 Abnormal  99 %   07/26/22 0427 -- -- -- 69/35 Abnormal  55 Abnormal  --   07/26/22 0423 -- -- -- 73/37 Abnormal  -- --   07/26/22 0415 -- 76 11 Abnormal  69/30 Abnormal  43 Abnormal  98 %   07/26/22 0345 -- 80 38 Abnormal  81/42 Abnormal  53 Abnormal  99 %   07/26/22 0315 97 6 °F (36 4 °C) 86 -- 95/47 Abnormal  55 Abnormal  98 %   07/26/22 0215 -- -- -- 95/48 Abnormal  69 --   07/26/22 0200 -- 90 18 98/51 71 93 %   07/26/22 0126 -- -- -- 101/50 -- --   07/26/22 0115 -- 87 17 92/52 69 96 %   07/26/22 0031 -- -- -- 96/50 -- --   07/25/22 2330 -- 111 Abnormal  -- 98/55 72 92 %       Pertinent Labs/Diagnostic Test Results:   7/25 - EKG  Sinus tachycardia    7/26 - Echo   Left Ventricle: Left ventricular cavity size is normal  Wall thickness is normal  There is no concentric hypertrophy  The left ventricular ejection fraction is 75%  Systolic function is hyperdynamic   Wall motion is normal  Diastolic function is normal     Aortic Valve: The leaflets are mildly thickened  The leaflets are moderately calcified  There is mild regurgitation  Unable to assess aortic valve stenosis due to poor Doppler exam  There is aortic sclerosis   Mitral Valve: There is mild regurgitation   Tricuspid Valve: There is mild regurgitation  The right ventricular systolic pressure is mildly elevated  The estimated right ventricular systolic pressure is 85 15 mmHg  VAS lower limb venous duplex study, complete bilateral   Final Result by Antonia Diehl MD (07/26 2227)      XR chest 1 view portable   Final Result by Aamir Vital MD (07/26 1023)   No acute cardiopulmonary findings                     Workstation performed: [id number]           Results from last 7 days   Lab Units 07/25/22  2221   SARS-COV-2  Negative     Results from last 7 days   Lab Units 07/27/22  0546 07/26/22  1225 07/26/22  0340 07/25/22  2221   WBC Thousand/uL 13 27* 22 15* 25 20* 13 81*   HEMOGLOBIN g/dL 7 7* 7 6* 6 6* 8 2*   HEMATOCRIT % 23 9* 23 1* 20 9* 25 6*   PLATELETS Thousands/uL 281 284 299 345   NEUTROS ABS Thousands/µL 9 00* 18 34*  --  11 47*         Results from last 7 days   Lab Units 07/26/22  1901 07/26/22  1225 07/26/22  0408 07/25/22  2221   SODIUM mmol/L 134*  --  134* 131*   POTASSIUM mmol/L 3 8  --  4 0 4 4   CHLORIDE mmol/L 107  --  106 105   CO2 mmol/L 19*  --  21 20*   ANION GAP mmol/L 8  --  7 6   BUN mg/dL 25  --  31* 30*   CREATININE mg/dL 1 20  --  1 25 1 16   EGFR ml/min/1 73sq m 43  --  41 44   CALCIUM mg/dL 8 2*  --  8 0* 8 4   CALCIUM, IONIZED mmol/L  --  1 08*  --   --    MAGNESIUM mg/dL 2 3  --  1 9  --    PHOSPHORUS mg/dL 3 6  --  3 3  --      Results from last 7 days   Lab Units 07/26/22  0408 07/25/22  2221   AST U/L 41 35   ALT U/L 37 30   ALK PHOS U/L 83 100   TOTAL PROTEIN g/dL 6 5 7 6   ALBUMIN g/dL 1 8* 2 1*   TOTAL BILIRUBIN mg/dL 0 35 0 34         Results from last 7 days   Lab Units 07/26/22  1901 07/26/22  0408 07/25/22  2221   GLUCOSE RANDOM mg/dL 110 107 124     Results from last 7 days   Lab Units 07/26/22  0226 07/26/22  0034 07/25/22  2221   HS TNI 0HR ng/L  --   --  6   HS TNI 2HR ng/L  --  8  --    HSTNI D2 ng/L  --  2  --    HS TNI 4HR ng/L 8  --   --    HSTNI D4 ng/L 2  --   --          Results from last 7 days   Lab Units 07/25/22  2221   PROTIME seconds 15 4*   INR  1 20*   PTT seconds 34         Results from last 7 days   Lab Units 07/25/22  2221   PROCALCITONIN ng/ml 0 60*     Results from last 7 days   Lab Units 07/25/22  2221   LACTIC ACID mmol/L 1 8     Results from last 7 days   Lab Units 07/26/22  0239   CLARITY UA  Turbid   COLOR UA  Yellow   SPEC GRAV UA  1 013   PH UA  5 0   GLUCOSE UA mg/dl Negative   KETONES UA mg/dl Negative   BLOOD UA  Moderate*   PROTEIN UA mg/dl 30 (1+)*   NITRITE UA  Negative   BILIRUBIN UA  Negative   UROBILINOGEN UA (BE) mg/dl <2 0   LEUKOCYTES UA  Small*   WBC UA /hpf 4-10*   RBC UA /hpf 20-30*   BACTERIA UA /hpf Occasional   EPITHELIAL CELLS WET PREP /hpf Occasional   MUCUS THREADS  Occasional*     Results from last 7 days   Lab Units 07/25/22 2239 07/25/22  2221   STREP PNEUMONIAE ANTIGEN, URINE  Negative  --    LEGIONELLA URINARY ANTIGEN  Negative  --    INFLUENZA A PCR   --  Negative   INFLUENZA B PCR   --  Negative   RSV PCR   --  Negative     Results from last 7 days   Lab Units 07/26/22  1055   C DIFF TOXIN B BY PCR  Negative     Results from last 7 days   Lab Units 07/26/22  0506 07/25/22  2221   BLOOD CULTURE  Received in Microbiology Lab  Culture in Progress    --    GRAM STAIN RESULT   --  Gram positive cocci in clusters*  Gram positive cocci in clusters*         ED Treatment:   Medication Administration from 07/25/2022 2204 to 07/26/2022 0309       Date/Time Order Dose Route Action     07/25/2022 2240 sodium chloride 0 9 % bolus 1,000 mL 1,000 mL Intravenous New Bag     07/25/2022 2236 acetaminophen (TYLENOL) tablet 325 mg 325 mg Oral Given     07/25/2022 2237 cefepime (MAXIPIME) 2 g/50 mL dextrose IVPB 2,000 mg Intravenous New Bag     07/26/2022 0019 vancomycin (VANCOCIN) IVPB (premix in dextrose) 1,000 mg 200 mL 1,000 mg Intravenous New Bag     07/26/2022 0117 multi-electrolyte (ISOLYTE-S PH 7 4) bolus 500 mL 500 mL Intravenous New Bag     07/26/2022 0133 multi-electrolyte (ISOLYTE-S PH 7 4) bolus 550 mL 550 mL Intravenous New Bag        Past Medical History:   Diagnosis Date    Anemia     Anxiety     Bipolar disorder (HCC)     Colon polyp     Disease of thyroid gland     Essential hypertension     Essential tremor     Fibromyalgia, primary     GERD (gastroesophageal reflux disease)     Inflammatory polyarthropathy (HCC)     Mammogram abnormal     Pressure injury of skin        Admitting Diagnosis: Cardiac murmur [R01 1]  Hyponatremia [E87 1]  Fever [R50 9]  NOAH (acute kidney injury) ([facility] ) [N17 9]  Stage II pressure ulcer of sacral region ([facility] [facility] ) [L89 152]  Sepsis ([address] ) [A41 9]  Age/Sex: 76 y o  female  Admission Orders:  Clear Liquid Diet  Fall precautions  Cardio-Pulm monitoring  DW  I&O  SCDs  q4h neuro checks      Scheduled Medications:  cefepime, 2,000 mg, Intravenous, Q12H  levothyroxine, 112 mcg, Oral, Early Morning  LORazepam, 0 25 mg, Oral, HS  melatonin, 6 mg, Oral, HS  miSOPROStol, 200 mcg, Oral, 4x Daily (with meals and at bedtime)  pantoprazole, 40 mg, Intravenous, Q12H RHONA  sucralfate, 1 g, Oral, 4x Daily (AC & HS)    Continuous IV Infusions:  multi-electrolyte, 75 mL/hr, Intravenous, Continuous  norepinephrine, 1-30 mcg/min, Intravenous, Titrated    PRN Meds:  acetaminophen, 650 mg, Oral, Q6H PRN; 7/26 x1  calcium gluconate, 1 g, Intravenous; 7/26 x1  multi-electrolyte, 1,000 mL Bolus, Intravenous; 7/26 x1  vancomycin, 15 mg/kg, Intravenous, Daily PRN        IP CONSULT TO CASE MANAGEMENT  IP CONSULT TO NUTRITION SERVICES  IP CONSULT TO PHARMACY    Network Utilization Review Department  ATTENTION: Please call with any questions or concerns to [phone] and carefully listen to the prompts so that you are directed to the right person  All voicemails are confidential   Meet [facility] all requests for admission clinical reviews, approved or denied determinations and any other requests to dedicated fax number below belonging to the campus where the patient is receiving treatment   List of dedicated fax numbers for the Facilities:  [address] DENIALS (Administrative/Medical Necessity) [phone]   [address] (Maternity/NICU/Pediatrics) [phone]   [address]  [address] [id number] [place] [address] [address] [facility] [place] [address] [phone]

## 2022-07-27 NOTE — PLAN OF CARE
Problem: OCCUPATIONAL THERAPY ADULT  Goal: Performs self-care activities at highest level of function for planned discharge setting  See evaluation for individualized goals  Description: Treatment Interventions: ADL retraining, Functional transfer training, Endurance training, Patient/family training, Cognitive reorientation, Equipment evaluation/education, Compensatory technique education, Energy conservation          See flowsheet documentation for full assessment, interventions and recommendations  Note: Limitation: Decreased ADL status, Decreased cognition, Decreased endurance, Decreased self-care trans, Decreased high-level ADLs  Prognosis: Good  Assessment: Pt is a 76 y o  female who [facility] with Gram-positive bacteremia  Pt admitted to the ED with fever and transferred to ICU for hemodinamically unstable and required pressor support  Pt  has a past medical history of Anemia, Anxiety, Bipolar disorder ([facility] [address] ), Colon polyp, Disease of thyroid gland, Essential hypertension, Essential tremor, Fibromyalgia, primary, GERD (gastroesophageal reflux disease), Inflammatory polyarthropathy ([facility] [address] ), Mammogram abnormal, and Pressure injury of skin  At baseline pt was I w/ ADLs, IADLs, and mobility  Pt lives alone in a multi level home  Currently pt requires min-mod A for overall ADLS and min-mod Ax2 for functional mobility/transfers w/ HHA  Pt currently presents with impairments in the following categories -limited home support, difficulty performing ADLS, difficulty performing IADLS, limited insight into deficits, activity tolerance, endurance, standing balance/tolerance and sitting balance/tolerance  These impairments, as well as pt's fatigue, decreased caregiver support and risk for falls  limit pt's ability to safely engage in all baseline areas of occupation, including grooming, bathing, dressing, toileting, functional mobility/transfers and leisure activities   The patient's raw score on the AM-PAC Daily Activity inpatient short form is 15, standardized score is 34 69, less than 39 4  Patients at this level are likely to benefit from discharge to post-acute rehabilitation services  Please refer to the recommendation of the Occupational Therapist for safe discharge planning  From OT standpoint, recommend inpatient rehab upon D/C  OT will continue to follow to address the below stated goals       OT Discharge Recommendation: Post acute rehabilitation services (return to rehab)

## 2022-07-27 NOTE — CONSULTS
Consultation - Infectious Disease   Meryle Greet 76 y o  female MRN: [id number]  [facility]: [id number]      IMPRESSION & RECOMMENDATIONS:   Impression/Recommendations: This is a 76 y o  female, with multiple medical problems including status post distant gastric bypass, with severe malnutrition, on outpatient TPN, admitted 07/25 with septic shock, likely secondary to PICC line bacteremia  PICC has been removed  Blood culture with growth of Staphylococcus epidermidis  1  Septic shock, present on admission, likely secondary to PICC related bacteremia  PICC has been removed  Patient is clinically much improved  She remains on pressor but pressor support is decreasing  Temperature is down  WBC decreasing  She is systemically well, without toxicity  Antibiotic plan as in below  Monitor temperature/WBC  Monitor hemodynamics  Pressor support and weaning per Critical Care Medicine Service  2  MSSE bacteremia, most likely secondary to PICC line, with patient on TPN  PICC has been removed  Patient is currently on IV vancomycin  2D echo without vegetation  Repeat blood culture obtained on 07/26 has no growth thus far  Will deescalate antibiotic regimen  Change antibiotic to high-dose IV cefazolin  Monitor temperature/WBC  Monitor hemodynamics  Follow-up on repeat blood cultures  3  NOAH, present on admission, most likely secondary to septic shock  Creatinine is much improved  Antibiotic at full dose  Monitor creatinine  4  Small sacral decubitus ulcer, without evidence of cellulitis  Local ulcer care  Monitor  5  Gastric ulcer  6  Severe protein calorie malnutrition, on outpatient TPN  Given septic shock of PICC origin, risks and benefits of continued TPN should be reassessed  It appears the patient's appetite is good and she is eating normally  7  Status post distant gastric bypass      Previous outpatient and hospitalization records reviewed in detail  Discussed with patient in detail regarding the above plan  Discussed with Critical Care Medicine Service  Thank you for this consultation  We will follow along with you  HISTORY OF PRESENT ILLNESS:  Reason for Consult:  Septic shock with staphylococcal bacteremia  HPI: Michelle Miranda is a 76 y o  female, with multiple medical problems including inflammatory polyneuropathy, status post distant gastric bypass, with malnutrition on TPN, sent to ER from her rehab facility on 07/25 with fever and chills  On presentation, patient had fever, leukocytosis and hypotension  She was admitted to ICU for septic shock  Pressors were started  Patient was initially placed on vancomycin/cefepime  Blood culture subsequently grew GPC in clusters  IV vancomycin was continued  Cefepime was discontinued  PICC line was removed  We are asked to evaluate the patient  At present, patient states that she feels a little better  She still has generalized fatigue and mild dizziness but no further chills  No pain or discomfort at all PICC site  She has stable mild epigastric pain  Patient is status post distant gastric bypass  Due to epigastric pain, patient was recently diagnosed with gastric ulcer  He was also noted that she continues to lose weight despite eating  Therefore, TPN was started 2 months ago via right arm PICC  Patient has been in rehab facility for her TPN  No prior problem with PICC line  REVIEW OF SYSTEMS:  A complete system-based review was done  Except for what is noted in HPI above, ROS of systems is otherwise negative      PAST MEDICAL HISTORY:  Past Medical History:   Diagnosis Date    Anemia     Anxiety     Bipolar disorder ([address] )     Colon polyp     Disease of thyroid gland     Essential hypertension     Essential tremor     Fibromyalgia, primary     GERD (gastroesophageal reflux disease)     Inflammatory polyarthropathy (HCC)     Mammogram abnormal     Pressure injury of skin      Past Surgical History:   Procedure Laterality Date    BREAST BIOPSY Right     benign    CARPAL TUNNEL RELEASE Bilateral     COLONOSCOPY      EGD AND COLONOSCOPY  2014    GASTRIC BYPASS  2012    MAMMO NEEDLE LOCALIZATION RIGHT (ALL INC) Right 2012    MAMMO NEEDLE LOCALIZATION RIGHT (ALL INC) Right 2012    ULNAR NERVE TRANSPOSITION Right      Problem list reviewed  FAMILY HISTORY:  Non-contributory    SOCIAL HISTORY:  Social History     Substance and Sexual Activity   Alcohol Use Yes    Alcohol/week: 4 0 standard drinks    Types: 4 Glasses of wine per week    Comment: rare     Social History     Substance and Sexual Activity   Drug Use Never     Social History     Tobacco Use   Smoking Status Former Smoker    Quit date:     Years since quittin 5   Smokeless Tobacco Never Used       ALLERGIES:  No Known Allergies    MEDICATIONS:  All current active medications have been reviewed  Patient is currently on IV vancomycin  PHYSICAL EXAM:  Vitals:  Temp:  [97 7 °F (36 5 °C)-99 9 °F (37 7 °C)] 99 2 °F (37 3 °C)  HR:  [] 76  Resp:  [14-40] 14  BP: ()/(42-83) 97/49  SpO2:  [86 %-97 %] 96 %  Temp (24hrs), Av °F (37 2 °C), Min:97 7 °F (36 5 °C), Max:99 9 °F (37 7 °C)  Current: Temperature: 99 2 °F (37 3 °C)     Physical Exam:  General:  Near cachectic, acute and chronically ill appearing, nontoxic, in no acute distress  Awake, alert and oriented x 3  Eyes:  Conjunctive clear with no hemorrhages or effusions  Oropharynx:  No ulcers, no lesions, pharynx benign, no tonsillitis  Neck:  Supple, no lymphadenopathy, no mass, nontender  Lungs:  Expansion symmetric, no rales, no wheezing, no accessory muscle use  Cardiac:  Regular rate and rhythm, normal S1, normal S2, no murmurs  Abdomen:  Soft, nondistended, non-tender, no HSM  Sacrum:  Small sacral ulcer, without purulence  Mild erythema  Mild tenderness  No fluctuance    Extremities: No edema, no erythema, nontender  No ulcers  Old PICC site in right upper arm clean, without erythema, drainage or tenderness  Skin:  No rashes, no ulcers  Neurological:  Moves all four extremities spontaneously, sensation grossly intact    LABS, IMAGING, & OTHER STUDIES:  Lab Results:  I have personally reviewed pertinent labs  Results from last 7 days   Lab Units 07/27/22  0756 07/26/22  1901 07/26/22  0408 07/25/22  2221   POTASSIUM mmol/L 3 6 3 8 4 0 4 4   CHLORIDE mmol/L 108 107 106 105   CO2 mmol/L 21 19* 21 20*   BUN mg/dL 21 25 31* 30*   CREATININE mg/dL 0 99 1 20 1 25 1 16   EGFR ml/min/1 73sq m 54 43 41 44   CALCIUM mg/dL 8 3 8 2* 8 0* 8 4   AST U/L  --   --  41 35   ALT U/L  --   --  37 30   ALK PHOS U/L  --   --  83 100     Results from last 7 days   Lab Units 07/27/22  0546 07/26/22  1225 07/26/22  0340   WBC Thousand/uL 13 27* 22 15* 25 20*   HEMOGLOBIN g/dL 7 7* 7 6* 6 6*   PLATELETS Thousands/uL 281 284 299     Results from last 7 days   Lab Units 07/26/22  1055 07/26/22  0506 07/26/22  0352 07/25/22  2239 07/25/22  2221   BLOOD CULTURE   --  No Growth at 24 hrs   --   --   --    GRAM STAIN RESULT   --   --   --   --  Gram positive cocci in clusters*  Gram positive cocci in clusters*   MRSA CULTURE ONLY   --   --  No Methicillin Resistant Staphlyococcus aureus (MRSA) isolated  --   --    LEGIONELLA URINARY ANTIGEN   --   --   --  Negative  --    C DIFF TOXIN B BY PCR  Negative  --   --   --   --        Imaging Studies:   I have personally reviewed pertinent imaging study reports and images in PACS  CXR reviewed personally  No infiltrates  EKG, Pathology, and Other Studies:   I have personally reviewed pertinent reports

## 2022-07-27 NOTE — CASE MANAGEMENT
Case Management Assessment & Discharge Planning Note    Patient name Sophia Pac  Location MICU 13/MICU 13 MRN [id number]  : 1945 Date 2022       Current Admission Date: 2022  Current Admission Diagnosis:Gram-positive bacteremia   Patient Active Problem List    Diagnosis Date Noted    Gram-positive bacteremia 2022    Severe protein-calorie malnutrition ([address] ) 2022    Bilateral leg edema 2022    Ambulatory dysfunction 2022    Acute blood loss anemia 2022    Gastric ulcer 2022    Fever 2022    Anemia 2022    Dyspnea on exertion 2022    Generalized weakness 2022    Hyponatremia 2022    Abnormal CT scan 2022    H/O bariatric surgery - bypass 2022    Cerumen debris on tympanic membrane of right ear 2022    Nasal congestion 2022    Bilateral hearing loss 2022    Fibromyalgia syndrome 2021    Osteopenia of both forearms 2021    Medicare annual wellness visit, subsequent 2021    Shortness of breath 2021    Acute bronchitis 2021    COVID-19 2021    Dysphasia 2020    Epigastric pain 2020    Hypothyroidism 2020    Gastroesophageal reflux disease without esophagitis 2020    Depression 2020    Fibromyalgia, primary 2020    Iron deficiency 2020    Numbness of foot 2020      LOS (days): 1  Geometric Mean LOS (GMLOS) (days): 4 80  Days to GMLOS:3 2     OBJECTIVE:  PATIENT READMITTED TO HOSPITAL  Risk of Unplanned Readmission Score: 21 6         Current admission status: Inpatient       Preferred Pharmacy:   [facility] [address] [facility], [facility] [address], [place] [address] [facility] [address]  Phone: [phone] Fax: [phone]    Primary Care Provider: Julee Krause MD    Primary Insurance: [facility]  Secondary Insurance:     ASSESSMENT:  Active Health Care Proxies    There are no active Health Care Proxies on file  Readmission Root Cause  30 Day Readmission: Yes  Who directed you to return to the hospital?: Self  Did you understand whom to contact if you had questions or problems?: Yes  Patient was readmitted due to: polyuria and polydipsia  Action Plan: IV abx    Patient Information  Admitted from[de-identified] Facility ([facility])  Mental Status: Alert  During Assessment patient was accompanied by: Not accompanied during assessment  Assessment information provided by[de-identified] Patient  Primary Caregiver: Self  Support Systems: Family members  [place] of Residence: [address] [place] [address] do you live in?: [email or web address] entry access options   Select all that apply : Stairs  Number of steps to enter home : 2  Type of Current Residence: 2 story home  Upon entering residence, is there a bedroom on the main floor (no further steps)?: No  A bedroom is located on the following floor levels of residence (select all that apply):: 2nd Floor  Upon entering residence, is there a bathroom on the main floor (no further steps)?: Yes (half)  Number of steps to 2nd floor from main floor: One Flight  In the last 12 months, was there a time when you were not able to pay the mortgage or rent on time?: No  In the last 12 months, how many places have you lived?: 1  In the last 12 months, was there a time when you did not have a steady place to sleep or slept in a shelter (including now)?: No  Living Arrangements: Lives Alone    Activities of Daily Living Prior to Admission  Functional Status: Assistance  Completes ADLs independently?: No  Level of ADL dependence: Assistance  Ambulates independently?: Yes  Does patient use assisted devices?: No  Does patient currently own DME?: No  Does patient have a history of Outpatient Therapy (PT/OT)?: No  Does the patient have a history of Short-Term Rehab?: Yes (pt admitted from [address] [place] [address])  Does patient have a history of [facility]?: No  Does patient currently have Klarissa Lozada?: No         Patient Information Continued  Income Source: Pension/jail  Within the past 12 months, you worried that your food would run out before you got the money to buy more : Never true  Within the past 12 months, the food you bought just didn't last and you didn't have money to get more : Never true  Food insecurity resource given?: N/A  Does patient have a history of substance abuse?: No         Means of Transportation  Means of Transport to Appts[de-identified] Drives Self  In the past 12 months, has lack of transportation kept you from medical appointments or from getting medications?: No  In the past 12 months, has lack of transportation kept you from meetings, work, or from getting things needed for daily living?: No  Was application for public transport provided?: N/A        DISCHARGE DETAILS:    Discharge planning discussed with[de-identified] Patient  Freedom of Choice: Yes  Comments - Freedom of Choice: FOC-pt in agreemet to return to [address] [place] [address] contacted family/caregiver?: No- see comments (declined-pt in contact with sister)          Other Referral/Resources/Interventions Provided:  Referral Comments: Patient in agreement to retun to [facility]; CM to place referral in [facility]         Treatment Team Recommendation: SNF     Patient/caregiver received discharge checklist   Content reviewed  Patient/caregiver encouraged to participate in discharge plan of care prior to discharge home  CM reviewed d/c planning process including the following: identifying help at home, patient preference for d/c planning needs, Discharge Lounge, [facility] Meds to Bed program, availability of treatment team to discuss questions or concerns patient and/or family may have regarding understanding medications and recognizing signs and symptoms once discharged  CM also encouraged patient to follow up with all recommended appointments after discharge   Patient advised of importance for patient and family to participate in managing patients medical well being  CM is a 30 day readmit and discussed with CM recent admissions and current admission from [address] [place] [address]  Pt is in agreement to return once medically cleared for d/c

## 2022-07-27 NOTE — PLAN OF CARE
Problem: PHYSICAL THERAPY ADULT  Goal: Performs mobility at highest level of function for planned discharge setting  See evaluation for individualized goals  Description: Treatment/Interventions: Functional transfer training, LE strengthening/ROM, Therapeutic exercise, Endurance training, Bed mobility, Gait training, Equipment eval/education, OT  Equipment Recommended: Walker       See flowsheet documentation for full assessment, interventions and recommendations  Note: Prognosis: Good  Problem List: Decreased strength, Decreased endurance, Impaired balance, Decreased mobility, Decreased cognition  Assessment: Pt is a 75 yo female admitted to [place] [address] on 7/26/2022 s/p AMS  DX: hypotension, HTN, hx of gastric bypass/ulcer, sacral ulcer  Two patient identifiers were used to confirm  [facility] patient [facility]  Pt was I for ADL's and mobility prior  Pt came from rehab prior to admission  Will need to confirm WILLI  Pt's impairments include reduced mobility, poor endurance, high risk of falling, confusion, poor safety awareness, poor insights into impairments  These impairments limit the ability of the patient to perform mobility without increased assistance, return to PLOF and participate in everyday life activities  Pt would benefit from continued skilled therapy while in the hospital to improve overall mobility and work towards a safe d/c  Recommend discharge to rehab  At the end of the session the patient was left in supine position with call bell and phone within reach  The patient's AM-PAC Basic Mobility Inpatient Short Form Raw Score is 10  A Raw score of less than or equal to 16 suggests the patient may benefit from discharge to post-acute rehabilitation services  Please also refer to the recommendation of the Physical Therapist for safe discharge planning  Pt agreeable to therapy after some education    Barriers to Discharge: Inaccessible home environment, Decreased caregiver support     PT Discharge Recommendation: Post acute rehabilitation services    See flowsheet documentation for full assessment

## 2022-07-27 NOTE — OCCUPATIONAL THERAPY NOTE
Occupational Therapy Evaluation     Patient Name: Carey Tobar  PKCFK'E Date: 7/27/2022  Problem List  Principal Problem:    Gram-positive bacteremia    Past Medical History  Past Medical History:   Diagnosis Date    Anemia     Anxiety     Bipolar disorder ([address] )     Colon polyp     Disease of thyroid gland     Essential hypertension     Essential tremor     Fibromyalgia, primary     GERD (gastroesophageal reflux disease)     Inflammatory polyarthropathy (HCC)     Mammogram abnormal     Pressure injury of skin      Past Surgical History  Past Surgical History:   Procedure Laterality Date    BREAST BIOPSY Right 2015    benign    CARPAL TUNNEL RELEASE Bilateral     COLONOSCOPY      EGD AND COLONOSCOPY  01/01/2014    GASTRIC BYPASS  07/01/2012    MAMMO NEEDLE LOCALIZATION RIGHT (ALL INC) Right 2/6/2012    MAMMO NEEDLE LOCALIZATION RIGHT (ALL INC) Right 2/6/2012    ULNAR NERVE TRANSPOSITION Right            07/27/22 1330   OT Last Visit   OT Visit Date 07/27/22   Note Type   Note type Evaluation   Restrictions/Precautions   Weight Bearing Precautions Per Order No   Other Precautions Fall Risk;Telemetry;Multiple lines;Cognitive; Chair Alarm   Pain Assessment   Pain Assessment Tool 0-10   Pain Score No Pain   Home Living   Type of Home House   Home Layout Multi-level   Bathroom Shower/Tub Tub/shower unit   Bathroom Toilet Standard   Bathroom Equipment Grab bars in shower   Bathroom Accessibility   (none)   Home Equipment   (none)   Prior Function   Level of [place] Independent with ADLs and functional mobility   Lives With (S)  Alone   Receives Help From Family   ADL Assistance Independent   IADLs Independent   Vocational Retired  (retired flight attendent)   Comments Pt was upset about lack of sleep and not eating lunch today  She reports living alone in a 3 SH  Other information gathered from previous admission   Lifestyle   Autonomy Pt was I w/ ADLs, IADLs, and mobility at baseline  No DME use  Reciprocal Relationships Supportive sister, brother in law, and brother (per chart review)   Service to Others retired    Nikolay Doe enjoys gardening and managing her home  Reports being very artistic   Psychosocial   Psychosocial (WDL) WDL   Subjective   Subjective "I did not sleep all night"   ADL   Where Assessed Edge of bed   Eating Assistance 5  Supervision/Setup   Eating Deficit Setup   Grooming Assistance 4  Minimal Assistance   UB Bathing Assistance 4  Minimal Assistance   LB Bathing Assistance 3  Moderate Assistance   UB Dressing Assistance 4  Minimal Assistance   LB Dressing Assistance 3  Moderate Assistance   Toileting Assistance  3  Moderate Assistance   Functional Assistance 3  Moderate Assistance   Bed Mobility   Supine to Sit 4  Minimal assistance   Additional items Assist x 1   Sit to Supine 4  Minimal assistance   Additional items Assist x 1   Transfers   Sit to Stand 4  Minimal assistance   Additional items Assist x 1   Stand to Sit 4  Minimal assistance   Additional items Assist x 1   Stand pivot 3  Moderate assistance   Additional items Assist x 2   Additional Comments Pt was unsteady on her feet once standing and required HHAx2 to side step next to bed  Balance   Static Sitting Fair -   Dynamic Sitting Poor +   Static Standing Poor   Dynamic Standing Poor -   Activity Tolerance   Activity Tolerance Patient limited by fatigue   Medical Staff Made Aware PT   Nurse Made Aware RN approved therapy session   RUE Assessment   RUE Assessment WFL   LUE Assessment   LUE Assessment WFL   Hand Function   Gross Motor Coordination Functional   Fine Motor Coordination Functional   Sensation   Light Touch No apparent deficits   Cognition   Overall Cognitive Status Impaired   Arousal/Participation Responsive; Cooperative   Attention Attends with cues to redirect   Orientation Level Oriented X4   Memory Decreased recall of precautions   Following Commands Follows one step commands without difficulty   Comments Pt agreeble to session but upset about not sleeping overnight  She had difficulty attending to task and regulating her mood  Assessment   Limitation Decreased ADL status; Decreased cognition;Decreased endurance;Decreased self-care trans;Decreased high-level ADLs   Prognosis Good   Assessment Pt is a 76 y o  female who was admitted to [facility] on 7/25/2022 with Gram-positive bacteremia  Pt admitted to the ED with fever and transferred to ICU for hemodinamically unstable and required pressor support  Pt  has a past medical history of Anemia, Anxiety, Bipolar disorder ([address] ), Colon polyp, Disease of thyroid gland, Essential hypertension, Essential tremor, Fibromyalgia, primary, GERD (gastroesophageal reflux disease), Inflammatory polyarthropathy ([facility] [address] [address] ), Mammogram abnormal, and Pressure injury of skin  At baseline pt was I w/ ADLs, IADLs, and mobility  Pt lives alone in a multi level home  Currently pt requires min-mod A for overall ADLS and min-mod Ax2 for functional mobility/transfers w/ HHA  Pt currently presents with impairments in the following categories -limited home support, difficulty performing ADLS, difficulty performing IADLS, limited insight into deficits, activity tolerance, endurance, standing balance/tolerance and sitting balance/tolerance  These impairments, as well as pt's fatigue, decreased caregiver support and risk for falls  limit pt's ability to safely engage in all baseline areas of occupation, including grooming, bathing, dressing, toileting, functional mobility/transfers and leisure activities  The patient's raw score on the AM-PAC Daily Activity inpatient short form is 15, standardized score is 34 69, less than 39 4  Patients at this level are likely to benefit from discharge to post-acute rehabilitation services  Please refer to the recommendation of the Occupational Therapist for safe discharge planning   From OT standpoint, recommend inpatient rehab upon D/C  OT will continue to follow to address the below stated goals  Goals   Patient Goals to eat lunch   LTG Time Frame 10-14   Long Term Goal see below goals   Plan   Treatment Interventions ADL retraining;Functional transfer training; Endurance training;Patient/family training;Cognitive reorientation;Equipment evaluation/education; Compensatory technique education; Energy conservation   Goal Expiration Date 08/10/22   OT Frequency 3-5x/wk   Recommendation   OT Discharge Recommendation Post acute rehabilitation services  (return to rehab)   AM-PAC Daily Activity Inpatient   Lower Body Dressing 2   Bathing 2   Toileting 2   Upper Body Dressing 3   Grooming 3   Eating 3   Daily Activity Raw Score 15   Daily Activity Standardized Score (Calc for Raw Score >=11) 34 69   LTG (10-14 DAYS)  Pt will improve activity tolerance to G for min 30-45 min txment sessions to increase participation in ADLs    Pt will complete ADLs/self care w/ mod I using adaptive device and DME as needed    Pt will complete toileting w/ mod I w/ G hygiene/thoroughness using DME as needed    Pt will improve functional transfers on/off all surfaces to mod I using DME as needed w/ G balance/safety  Pt will improve functional mobility during ADL/IADL/leisure tasks to mod I using DME as needed w/ G balance/safety  Pt will improve standing balance to G for 8-10 minutes of purposeful activity w/ G endurance      Pt will demonstrate G carryover of pt/caregiver education and training as appropriate w/ mod I w/o cues w/ good tolerance    Pt will demonstrate 100% carryover of energy conservation techniques w/ mod I t/o functional I/ADL/leisure tasks w/o cues s/p skilled education     Pt will engage in ongoing cognitive assessment (as needed) w/ G participation w/ mod I to A w/ safe d/c planning/recommendations        Neha Mueller OTR/L

## 2022-07-27 NOTE — CASE MANAGEMENT
Case Management Discharge Planning Note    Patient name Vandana Burgos  Location MICU 13/MICU 13 MRN [id number]  : 1945 Date 2022       Current Admission Date: 2022  Current Admission Diagnosis:Gram-positive bacteremia   Patient Active Problem List    Diagnosis Date Noted    Gram-positive bacteremia 2022    Severe protein-calorie malnutrition ([address] ) 2022    Bilateral leg edema 2022    Ambulatory dysfunction 2022    Acute blood loss anemia 2022    Gastric ulcer 2022    Fever 2022    Anemia 2022    Dyspnea on exertion 2022    Generalized weakness 2022    Hyponatremia 2022    Abnormal CT scan 2022    H/O bariatric surgery - bypass 2022    Cerumen debris on tympanic membrane of right ear 2022    Nasal congestion 2022    Bilateral hearing loss 2022    Fibromyalgia syndrome 2021    Osteopenia of both forearms 2021    Medicare annual wellness visit, subsequent 2021    Shortness of breath 2021    Acute bronchitis 2021    COVID-19 2021    Dysphasia 2020    Epigastric pain 2020    Hypothyroidism 2020    Gastroesophageal reflux disease without esophagitis 2020    Depression 2020    Fibromyalgia, primary 2020    Iron deficiency 2020    Numbness of foot 2020      LOS (days): 1  Geometric Mean LOS (GMLOS) (days): 4 80  Days to GMLOS:3 2     OBJECTIVE:  Risk of Unplanned Readmission Score: 21 65         Current admission status: Inpatient   Preferred Pharmacy:   [facility] [address] [facility], [address] [place] [id number]  Phone: [phone] Fax: [phone]    Primary Care Provider: Freida Tam MD    Primary Insurance: [facility]  Secondary Insurance:     DISCHARGE DETAILS:    Referral placed to [address] [place] [address]  in [address] [place] [address]

## 2022-07-27 NOTE — QUICK NOTE
Patient informed her nurse that she wants to be full code again  When I come to discuss with her about the decision, patient was fully aware of what full code entails  She stated that she wants to receive all life saving measures/procedure including intubation and cardiac resuscitation

## 2022-07-27 NOTE — PHYSICAL THERAPY NOTE
Physical Therapy evaluation note     Patient Name: Lencho VELASQUEZ Date: 7/27/2022     Problem List  Principal Problem:    Gram-positive bacteremia       Past Medical History  Past Medical History:   Diagnosis Date    Anemia     Anxiety     Bipolar disorder ([address] )     Colon polyp     Disease of thyroid gland     Essential hypertension     Essential tremor     Fibromyalgia, primary     GERD (gastroesophageal reflux disease)     Inflammatory polyarthropathy (HCC)     Mammogram abnormal     Pressure injury of skin         Past Surgical History  Past Surgical History:   Procedure Laterality Date    BREAST BIOPSY Right 2015    benign    CARPAL TUNNEL RELEASE Bilateral     COLONOSCOPY      EGD AND COLONOSCOPY  01/01/2014    GASTRIC BYPASS  07/01/2012    MAMMO NEEDLE LOCALIZATION RIGHT (ALL INC) Right 2/6/2012    MAMMO NEEDLE LOCALIZATION RIGHT (ALL INC) Right 2/6/2012    ULNAR NERVE TRANSPOSITION Right       07/27/22 1327   PT Last Visit   PT Visit Date 07/27/22   Note Type   Note type Evaluation   Pain Assessment   Pain Assessment Tool FLACC   Pain Rating: FLACC (Rest) - Face 0   Pain Rating: FLACC (Rest) - Legs 0   Pain Rating: FLACC (Rest) - Activity 0   Pain Rating: FLACC (Rest) - Cry 0   Pain Rating: FLACC (Rest) - Consolability 0   Score: FLACC (Rest) 0   Pain Rating: FLACC (Activity) - Face 0   Pain Rating: FLACC (Activity) - Legs 0   Pain Rating: FLACC (Activity) - Activity 0   Pain Rating: FLACC (Activity) - Cry 0   Pain Rating: FLACC (Activity) - Consolability 0   Score: FLACC (Activity) 0   Restrictions/Precautions   Weight Bearing Precautions Per Order No   Other Precautions Fall Risk;Telemetry;Multiple lines;Cognitive; Chair Alarm; Bed Alarm   Home Living   Type of Home House   Home Layout Multi-level   Additional Comments Per patient she lives in a [facility]  Pt as i for ADL's and mboility prior   Pt was most recently at a rehab facility ([facility])  Will need to confirm WILLI  Prior Function   Level of [place] Independent with ADLs and functional mobility   Lives With (S)  Alone   ADL Assistance Independent   IADLs Independent   General   Family/Caregiver Present No   Cognition   Overall Cognitive Status Impaired   Arousal/Participation Alert   Orientation Level Oriented X4   RLE Assessment   RLE Assessment X   Strength RLE   RLE Overall Strength   (grossly 3/5 observed with mboility)   LLE Assessment   LLE Assessment   (grossly 3/5 observed with mobility)   Bed Mobility   Supine to Sit 4  Minimal assistance   Additional items Assist x 1   Sit to Supine 4  Minimal assistance   Additional items Assist x 1   Additional Comments siting EOB S level   Transfers   Sit to Stand 4  Minimal assistance   Additional items Assist x 1   Stand to Sit 4  Minimal assistance   Additional items Assist x 1   Ambulation/Elevation   Gait pattern Excessively slow; Step to; Foward flexed; Shuffling   Gait Assistance 3  Moderate assist   Additional items Assist x 2   Assistive Device Other (Comment)  (HANNA HHA)   Distance 3ft to [facility]   Balance   Static Sitting Fair -   Dynamic Sitting Poor +   Static Standing Poor   Dynamic Standing Poor   Ambulatory Poor -   Endurance Deficit   Endurance Deficit Yes   Activity Tolerance   Activity Tolerance Patient limited by fatigue   Medical Staff Made Aware OT   Nurse Made Aware nurse approved therapy session   Assessment   Prognosis Good   Problem List Decreased strength;Decreased endurance; Impaired balance;Decreased mobility; Decreased cognition   Assessment Pt is a 77 yo female admitted to [place] [id number] on 7/26/2022 s/p AMS  DX: hypotension, HTN, hx of gastric bypass/ulcer, sacral ulcer  Two patient identifiers were used to confirm  Per patient she lives in a [place] alone  Pt was I for ADL's and mobility prior  Pt came from rehab prior to admission  Will need to confirm WILLI   Pt's impairments include reduced mobility, poor endurance, high risk of falling, confusion, poor safety awareness, poor insights into impairments  These impairments limit the ability of the patient to perform mobility without increased assistance, return to PLOF and participate in everyday life activities  Pt would benefit from continued skilled therapy while in the hospital to improve overall mobility and work towards a safe d/c  Recommend discharge to rehab  At the end of the session the patient was left in supine position with call bell and phone within reach  The patient's AM-PAC Basic Mobility Inpatient Short Form Raw Score is 10  A Raw score of less than or equal to 16 suggests the patient may benefit from discharge to post-acute rehabilitation services  Please also refer to the recommendation of the Physical Therapist for safe discharge planning  Pt agreeable to therapy after some education  Barriers to Discharge Inaccessible home environment;Decreased caregiver support   Goals   STG Expiration Date 08/10/22   Short Term Goal #1 STG 1: Pt will perform transfers at a MI level to return to baseline of function  STG 2: Pt will ambulate 300ft with RW at a MI level to reduce the level of assistance needed upon d/c home  STG 3: Pt will perform bed mobility at a I to safety return to PLOF  PT Treatment Day 0   Plan   Treatment/Interventions Functional transfer training;LE strengthening/ROM; Therapeutic exercise; Endurance training;Bed mobility;Gait training;Equipment eval/education;OT   PT Frequency 3-5x/wk   Recommendation   PT Discharge Recommendation Post acute rehabilitation services   Equipment Recommended [address] [facility] Recommended Wheeled walker   Change/add to [employer]?  No   AM-PAC Basic Mobility Inpatient   Turning in Bed Without Bedrails 3   Lying on Back to Sitting on Edge of Flat Bed 3   Moving Bed to Chair 1   Standing Up From Chair 1   Walk in Room 1   Climb 3-5 Stairs 1   Basic Mobility Inpatient Raw Score 10   Turning Head Towards Sound 4 Follow Simple Instructions 3   Low Function Basic Mobility Raw Score 17   Low Function Basic Mobility Standardized Score 27 46   Highest Level Of Mobility   [facility] Goal 4: Move to chair/commode   Glenn Hester PT, DPT

## 2022-07-27 NOTE — PLAN OF CARE
Problem: INFECTION - ADULT  Goal: Absence or prevention of progression during hospitalization  Description: INTERVENTIONS:  - Assess and monitor for signs and symptoms of infection  - Monitor lab/diagnostic results  - Monitor all insertion sites, i e  indwelling lines, tubes, and drains  - Monitor endotracheal if appropriate and nasal secretions for changes in amount and color  - [place] appropriate cooling/warming therapies per order  - Administer medications as ordered  - Instruct and encourage patient and family to use good hand hygiene technique  - Identify and instruct in appropriate isolation precautions for identified infection/condition  Outcome: Progressing

## 2022-07-28 LAB
ANION GAP SERPL CALCULATED.3IONS-SCNC: 4 MMOL/L (ref 4–13)
ANION GAP SERPL CALCULATED.3IONS-SCNC: 5 MMOL/L (ref 4–13)
BASOPHILS # BLD AUTO: 0.03 THOUSANDS/ΜL (ref 0–0.1)
BASOPHILS NFR BLD AUTO: 0 % (ref 0–1)
BUN SERPL-MCNC: 18 MG/DL (ref 5–25)
BUN SERPL-MCNC: 18 MG/DL (ref 5–25)
CALCIUM SERPL-MCNC: 8.5 MG/DL (ref 8.3–10.1)
CALCIUM SERPL-MCNC: 8.6 MG/DL (ref 8.3–10.1)
CHLORIDE SERPL-SCNC: 111 MMOL/L (ref 96–108)
CHLORIDE SERPL-SCNC: 112 MMOL/L (ref 96–108)
CO2 SERPL-SCNC: 23 MMOL/L (ref 21–32)
CO2 SERPL-SCNC: 23 MMOL/L (ref 21–32)
CREAT SERPL-MCNC: 0.89 MG/DL (ref 0.6–1.3)
CREAT SERPL-MCNC: 0.97 MG/DL (ref 0.6–1.3)
EOSINOPHIL # BLD AUTO: 0.85 THOUSAND/ΜL (ref 0–0.61)
EOSINOPHIL NFR BLD AUTO: 9 % (ref 0–6)
ERYTHROCYTE [DISTWIDTH] IN BLOOD BY AUTOMATED COUNT: 22.5 % (ref 11.6–15.1)
GFR SERPL CREATININE-BSD FRML MDRD: 55 ML/MIN/1.73SQ M
GFR SERPL CREATININE-BSD FRML MDRD: 61 ML/MIN/1.73SQ M
GLUCOSE SERPL-MCNC: 80 MG/DL (ref 65–140)
GLUCOSE SERPL-MCNC: 83 MG/DL (ref 65–140)
HCT VFR BLD AUTO: 22.7 % (ref 34.8–46.1)
HGB BLD-MCNC: 7.3 G/DL (ref 11.5–15.4)
IMM GRANULOCYTES # BLD AUTO: 0.07 THOUSAND/UL (ref 0–0.2)
IMM GRANULOCYTES NFR BLD AUTO: 1 % (ref 0–2)
LYMPHOCYTES # BLD AUTO: 2.17 THOUSANDS/ΜL (ref 0.6–4.47)
LYMPHOCYTES NFR BLD AUTO: 24 % (ref 14–44)
MAGNESIUM SERPL-MCNC: 2.2 MG/DL (ref 1.6–2.6)
MAGNESIUM SERPL-MCNC: 2.3 MG/DL (ref 1.6–2.6)
MCH RBC QN AUTO: 29.8 PG (ref 26.8–34.3)
MCHC RBC AUTO-ENTMCNC: 32.2 G/DL (ref 31.4–37.4)
MCV RBC AUTO: 93 FL (ref 82–98)
MONOCYTES # BLD AUTO: 0.74 THOUSAND/ΜL (ref 0.17–1.22)
MONOCYTES NFR BLD AUTO: 8 % (ref 4–12)
NEUTROPHILS # BLD AUTO: 5.25 THOUSANDS/ΜL (ref 1.85–7.62)
NEUTS SEG NFR BLD AUTO: 58 % (ref 43–75)
NRBC BLD AUTO-RTO: 0 /100 WBCS
PHOSPHATE SERPL-MCNC: 3 MG/DL (ref 2.3–4.1)
PHOSPHATE SERPL-MCNC: 3.2 MG/DL (ref 2.3–4.1)
PLATELET # BLD AUTO: 290 THOUSANDS/UL (ref 149–390)
PMV BLD AUTO: 10.5 FL (ref 8.9–12.7)
POTASSIUM SERPL-SCNC: 3.8 MMOL/L (ref 3.5–5.3)
POTASSIUM SERPL-SCNC: 3.9 MMOL/L (ref 3.5–5.3)
RBC # BLD AUTO: 2.45 MILLION/UL (ref 3.81–5.12)
SODIUM SERPL-SCNC: 138 MMOL/L (ref 135–147)
SODIUM SERPL-SCNC: 140 MMOL/L (ref 135–147)
WBC # BLD AUTO: 9.11 THOUSAND/UL (ref 4.31–10.16)

## 2022-07-28 PROCEDURE — C9113 INJ PANTOPRAZOLE SODIUM, VIA: HCPCS

## 2022-07-28 PROCEDURE — 87040 BLOOD CULTURE FOR BACTERIA: CPT | Performed by: INTERNAL MEDICINE

## 2022-07-28 PROCEDURE — 83735 ASSAY OF MAGNESIUM: CPT

## 2022-07-28 PROCEDURE — 80048 BASIC METABOLIC PNL TOTAL CA: CPT

## 2022-07-28 PROCEDURE — NC001 PR NO CHARGE: Performed by: INTERNAL MEDICINE

## 2022-07-28 PROCEDURE — 99233 SBSQ HOSP IP/OBS HIGH 50: CPT | Performed by: INTERNAL MEDICINE

## 2022-07-28 PROCEDURE — 85025 COMPLETE CBC W/AUTO DIFF WBC: CPT

## 2022-07-28 PROCEDURE — 84100 ASSAY OF PHOSPHORUS: CPT

## 2022-07-28 RX ORDER — HEPARIN SODIUM 5000 [USP'U]/ML
5000 INJECTION, SOLUTION INTRAVENOUS; SUBCUTANEOUS EVERY 8 HOURS SCHEDULED
Status: DISCONTINUED | OUTPATIENT
Start: 2022-07-28 | End: 2022-08-09 | Stop reason: HOSPADM

## 2022-07-28 RX ORDER — POTASSIUM CHLORIDE 20 MEQ/1
20 TABLET, EXTENDED RELEASE ORAL ONCE
Status: COMPLETED | OUTPATIENT
Start: 2022-07-28 | End: 2022-07-28

## 2022-07-28 RX ORDER — PANTOPRAZOLE SODIUM 40 MG/1
40 TABLET, DELAYED RELEASE ORAL
Status: DISCONTINUED | OUTPATIENT
Start: 2022-07-28 | End: 2022-08-09 | Stop reason: HOSPADM

## 2022-07-28 RX ADMIN — PANTOPRAZOLE SODIUM 40 MG: 40 INJECTION, POWDER, FOR SOLUTION INTRAVENOUS at 08:30

## 2022-07-28 RX ADMIN — MIDODRINE HYDROCHLORIDE 5 MG: 5 TABLET ORAL at 06:08

## 2022-07-28 RX ADMIN — Medication 6 MG: at 21:50

## 2022-07-28 RX ADMIN — PANTOPRAZOLE SODIUM 40 MG: 40 TABLET, DELAYED RELEASE ORAL at 15:06

## 2022-07-28 RX ADMIN — SUCRALFATE 1 G: 1 TABLET ORAL at 06:08

## 2022-07-28 RX ADMIN — SUCRALFATE 1 G: 1 TABLET ORAL at 10:54

## 2022-07-28 RX ADMIN — POTASSIUM CHLORIDE 20 MEQ: 1500 TABLET, EXTENDED RELEASE ORAL at 10:55

## 2022-07-28 RX ADMIN — MISOPROSTOL 200 MCG: 200 TABLET ORAL at 15:06

## 2022-07-28 RX ADMIN — SUCRALFATE 1 G: 1 TABLET ORAL at 21:50

## 2022-07-28 RX ADMIN — TRAZODONE HYDROCHLORIDE 150 MG: 100 TABLET ORAL at 21:50

## 2022-07-28 RX ADMIN — CLONAZEPAM 3 MG: 1 TABLET ORAL at 21:50

## 2022-07-28 RX ADMIN — HEPARIN SODIUM 5000 UNITS: 5000 INJECTION INTRAVENOUS; SUBCUTANEOUS at 10:54

## 2022-07-28 RX ADMIN — SUCRALFATE 1 G: 1 TABLET ORAL at 15:06

## 2022-07-28 RX ADMIN — VANCOMYCIN HYDROCHLORIDE 1000 MG: 1 INJECTION, SOLUTION INTRAVENOUS at 08:30

## 2022-07-28 RX ADMIN — MIDODRINE HYDROCHLORIDE 5 MG: 5 TABLET ORAL at 15:06

## 2022-07-28 RX ADMIN — HEPARIN SODIUM 5000 UNITS: 5000 INJECTION INTRAVENOUS; SUBCUTANEOUS at 15:02

## 2022-07-28 RX ADMIN — LEVOTHYROXINE SODIUM 112 MCG: 112 TABLET ORAL at 05:57

## 2022-07-28 RX ADMIN — MIDODRINE HYDROCHLORIDE 5 MG: 5 TABLET ORAL at 10:54

## 2022-07-28 RX ADMIN — SODIUM CHLORIDE, SODIUM GLUCONATE, SODIUM ACETATE, POTASSIUM CHLORIDE, MAGNESIUM CHLORIDE, SODIUM PHOSPHATE, DIBASIC, AND POTASSIUM PHOSPHATE 75 ML/HR: .53; .5; .37; .037; .03; .012; .00082 INJECTION, SOLUTION INTRAVENOUS at 04:08

## 2022-07-28 RX ADMIN — HEPARIN SODIUM 5000 UNITS: 5000 INJECTION INTRAVENOUS; SUBCUTANEOUS at 21:50

## 2022-07-28 NOTE — PROGRESS NOTES
Daily Progress - Critical Care   Vandana Burgos 76 y o  female MRN: [id number]  [facility] [id number]      -------------------------------------------------------------------------------------------------------------  Chief Complaint: fever    History of Present Illness     Vandana Burgos is a 76 y o  female with a PMH significant for bipolar disorder, HTN, inflammatory polyneuropathy, previous Savage-en-Y gastric bypass currently on TPN, who presented to the ED on 7/25 from a facility with a fever of 105 5F  TPN was started at that time due to severe malnutrition  Transferred to ICU for hemodinamically unstable, require pressor support  Subjective/OVN  Slept well with Klonopin and Trazodone  BP stable w/o pressors support  UOP has been adequate  No abdominal pain, fever, additional bout of loose stool   -------------------------------------------------------------------------------------------------------------  Assessment and Plan:  1  Septic Shock/Sepsis POA - WBC/HR/RR/Temp  2  Gram positive bacteremia given TPN/PICC line in place with sepsis on admission  3  Severe protein calorie malnutrition with chronic TPN post remote gastric bypas  4  Anemia with history gastric ulcer and GIB  5  Diarrhea  6  Hyponatremia  7  Sacral wound POA  8   Mild NOAH    Neuro:    No active concerns  o Continue with Melatonin   o CAM- ICU   o PTA Klonopin and Trazodone       CV:    Hypotension  o Presumably from septic shock  o See ID  o Received 30 cc/kg bolus in ED, additional 1 L bolus in MICU  o Start levo as needed to maintain MAP>65  - Weaned off Levo night of 7/26, became hypotensive once again   - Echo: 75 %, normal SF, DF  - Continue Midodrine 5 mg TID    HTN   o Not on any antihypertensives per home meds list      Pulm:   No active concerns      GI:    History of gastric bypass/ulcer  o Continue home protonix, carafate, cytotec  o Bariatric diet - Alerted Bariatric team of patient ICU admission   - Daily BMP, Mg, P  - Added dietary supplement TID       :    No active concerns      F/E/N:    D/C fluid    Replete electrolytes as needed        Heme/Onc:    DVT prophylaxis with heparin   Hgb stable, consider adding Ferrous sulfate for Iron d/f anemia   o Iron panel 6/2022 showed d/f       Endo:    No active concerns      ID:    Gram positive Bacteremia   o Unknown source - On low dose Levo   o Bedside RUQ US shows no signs of cholecystitis  - BC (7/25) x 2: Staph Epidermidis / Gram positive cocci in clusters  - C diff negative, stool culture negative   - 7/26 BC no growth   o Continue with IV Vanco (day 3/7)   o Tylenol PRN  o ID consulted - pending susceptibilities for staph  Epidermidis      MSK/Skin:    Sacral ulcer  o Wound care by nursing  o Frequent turning and repositioning  o OOB as able      Disposition: Admit to Critical Care   Code Status: Level 1 - Full Code  --------------------------------------------------------------------------------------------------------------  Review of Systems   Constitutional: Negative for chills and fever  HENT: Negative for ear pain and sore throat  Eyes: Negative for pain and visual disturbance  Respiratory: Negative for cough and shortness of breath  Cardiovascular: Negative for chest pain and palpitations  Gastrointestinal: Negative for abdominal pain and vomiting  Genitourinary: Negative for dysuria and hematuria  Musculoskeletal: Negative for arthralgias and back pain  Skin: Negative for color change and rash  Neurological: Negative for seizures, syncope and light-headedness  Psychiatric/Behavioral: Negative for agitation  All other systems reviewed and are negative  A 12-point, complete review of systems was reviewed and negative except as stated above     Physical Exam  Vitals and nursing note reviewed  Constitutional:       General: She is not in acute distress       Appearance: She is not ill-appearing or toxic-appearing  HENT:      Head: Normocephalic and atraumatic  Right Ear: External ear normal       Left Ear: External ear normal       Nose: Nose normal       Mouth/Throat:      Mouth: Mucous membranes are moist    Eyes:      Conjunctiva/sclera: Conjunctivae normal    Cardiovascular:      Rate and Rhythm: Normal rate and regular rhythm  Heart sounds: Normal heart sounds  Pulmonary:      Effort: Pulmonary effort is normal       Breath sounds: Normal breath sounds  Abdominal:      General: There is no distension  Palpations: Abdomen is soft  Musculoskeletal:         General: No deformity  Skin:     General: Skin is warm and dry  Capillary Refill: Capillary refill takes less than 2 seconds  Coloration: Skin is not jaundiced or pale  Neurological:      General: No focal deficit present  Mental Status: She is alert and oriented to person, place, and time  --------------------------------------------------------------------------------------------------------------  Vitals:   Vitals:    07/28/22 0301 07/28/22 0331 07/28/22 0431 07/28/22 0531   BP: 107/61 94/52 99/52 104/52   Pulse: 78 68 66 64   Resp: (!) 31 20 19 19   Temp:       TempSrc:       SpO2: 96% 94% 93% 95%   Weight:       Height:         Temp  Min: 97 4 °F (36 3 °C)  Max: 103 3 °F (39 6 °C)     Height: 5' 4" (162 6 cm)  Body mass index is 19 91 kg/m²    N/A    Laboratory and Diagnostics:  Results from last 7 days   Lab Units 07/28/22  0550 07/27/22  0546 07/26/22  1225 07/26/22  0340 07/25/22  2221   WBC Thousand/uL 9 11 13 27* 22 15* 25 20* 13 81*   HEMOGLOBIN g/dL 7 3* 7 7* 7 6* 6 6* 8 2*   HEMATOCRIT % 22 7* 23 9* 23 1* 20 9* 25 6*   PLATELETS Thousands/uL 290 281 284 299 345   NEUTROS PCT % 58 68 83*  --  83*   MONOS PCT % 8 7 6  --  3*     Results from last 7 days   Lab Units 07/28/22  0550 07/27/22  0756 07/26/22  1901 07/26/22  0408 07/25/22  2221   SODIUM mmol/L 140  138 135 134* 134* 131*   POTASSIUM mmol/L 3 9  3 8 3 6 3 8 4 0 4 4   CHLORIDE mmol/L 112*  111* 108 107 106 105   CO2 mmol/L 23  23 21 19* 21 20*   ANION GAP mmol/L 5  4 6 8 7 6   BUN mg/dL 18  18 21 25 31* 30*   CREATININE mg/dL 0 97  0 89 0 99 1 20 1 25 1 16   CALCIUM mg/dL 8 6  8 5 8 3 8 2* 8 0* 8 4   GLUCOSE RANDOM mg/dL 83  80 89 110 107 124   ALT U/L  --   --   --  37 30   AST U/L  --   --   --  41 35   ALK PHOS U/L  --   --   --  83 100   ALBUMIN g/dL  --   --   --  1 8* 2 1*   TOTAL BILIRUBIN mg/dL  --   --   --  0 35 0 34     Results from last 7 days   Lab Units 07/28/22  0550 07/27/22  0756 07/26/22  1901 07/26/22  0408   MAGNESIUM mg/dL 2 3  2 2 2 2 2 3 1 9   PHOSPHORUS mg/dL 3 2  3 0 3 2 3 6 3 3      Results from last 7 days   Lab Units 07/25/22  2221   INR  1 20*   PTT seconds 34          Results from last 7 days   Lab Units 07/25/22  2221   LACTIC ACID mmol/L 1 8     ABG:    VBG:    Results from last 7 days   Lab Units 07/25/22  2221   PROCALCITONIN ng/ml 0 60*     Imaging: I have personally reviewed pertinent reports        Historical Information   Past Medical History:   Diagnosis Date    Anemia     Anxiety     Bipolar disorder ([address] )     Colon polyp     Disease of thyroid gland     Essential hypertension     Essential tremor     Fibromyalgia, primary     GERD (gastroesophageal reflux disease)     Inflammatory polyarthropathy (HCC)     Mammogram abnormal     Pressure injury of skin      Past Surgical History:   Procedure Laterality Date    BREAST BIOPSY Right 2015    benign    CARPAL TUNNEL RELEASE Bilateral     COLONOSCOPY      EGD AND COLONOSCOPY  01/01/2014    GASTRIC BYPASS  07/01/2012    MAMMO NEEDLE LOCALIZATION RIGHT (ALL INC) Right 2/6/2012    MAMMO NEEDLE LOCALIZATION RIGHT (ALL INC) Right 2/6/2012    ULNAR NERVE TRANSPOSITION Right      Social History   Social History     Substance and Sexual Activity   Alcohol Use Yes    Alcohol/week: 4 0 standard drinks    Types: 4 Glasses of wine per week Comment: rare     Social History     Substance and Sexual Activity   Drug Use Never     Social History     Tobacco Use   Smoking Status Former Smoker    Quit date:     Years since quittin 5   Smokeless Tobacco Never Used     Family History:   Family History   Problem Relation Age of Onset    No Known Problems Mother     No Known Problems Father     No Known Problems Sister     No Known Problems Maternal Grandmother     No Known Problems Maternal Grandfather     No Known Problems Paternal Grandmother     No Known Problems Paternal Grandfather     No Known Problems Sister     No Known Problems Sister     Stomach cancer Maternal Aunt     No Known Problems Maternal Aunt     No Known Problems Maternal Aunt     No Known Problems Maternal Aunt     No Known Problems Paternal Aunt     Leukemia Other          Medications:  Current Facility-Administered Medications   Medication Dose Route Frequency    acetaminophen (TYLENOL) tablet 650 mg  650 mg Oral Q6H PRN    clonazePAM (KlonoPIN) tablet 3 mg  3 mg Oral HS    levothyroxine tablet 112 mcg  112 mcg Oral Early Morning    melatonin tablet 6 mg  6 mg Oral HS    midodrine (PROAMATINE) tablet 5 mg  5 mg Oral TID AC    miSOPROStol (Cytotec) tablet 200 mcg  200 mcg Oral 4x Daily (with meals and at bedtime)    multi-electrolyte (PLASMALYTE-A/ISOLYTE-S PH 7 4) IV solution  75 mL/hr Intravenous Continuous    norepinephrine (LEVOPHED) 4 mg (STANDARD CONCENTRATION) IV in sodium chloride 0 9% 250 mL  1-30 mcg/min Intravenous Titrated    ondansetron (ZOFRAN) injection 4 mg  4 mg Intravenous Q8H PRN    pantoprazole (PROTONIX) injection 40 mg  40 mg Intravenous Q12H RHONA    sucralfate (CARAFATE) tablet 1 g  1 g Oral 4x Daily (AC & HS)    traZODone (DESYREL) tablet 150 mg  150 mg Oral HS    vancomycin (VANCOCIN) IVPB (premix in dextrose) 1,000 mg 200 mL  1,000 mg Intravenous Q24H     Home medications:  Prior to Admission Medications   Prescriptions Last Dose Informant Patient Reported? Taking? clonazePAM (KlonoPIN) 1 mg tablet  Self Yes No   Sig: TK 3 TS PO Q NIGHT   folic acid (FOLVITE) 1 mg tablet   No No   Sig: Take 1 tablet (1 mg total) by mouth daily   levothyroxine 112 mcg tablet  Self Yes No   Sig: TK 1 T PO QD   misoprostol (CYTOTEC) 200 mcg tablet   No No   Sig: Take 1 tablet (200 mcg total) by mouth 4 (four) times a day (with meals and at bedtime)   pantoprazole (PROTONIX) 40 mg tablet   No No   Sig: Take 1 tablet (40 mg total) by mouth 2 (two) times a day before meals   Patient not taking: Reported on 6/16/2022   potassium chloride (K-DUR,KLOR-CON) 20 mEq tablet   No No   Sig: Take 2 tablets (40 mEq total) by mouth 2 (two) times a day   sucralfate (CARAFATE) 1 g tablet   No No   Sig: Take 1 tablet (1 g total) by mouth 4 (four) times a day (before meals and at bedtime)   Patient not taking: Reported on 6/16/2022   thiamine (VITAMIN B1) 100 mg tablet   No No   Sig: Take 1 tablet (100 mg total) by mouth daily   traZODone (DESYREL) 50 mg tablet  Self Yes No   Sig: Take 150 mg by mouth daily at bedtime      Facility-Administered Medications: None     Allergies:  No Known Allergies        Celestine Post DO        Portions of the record may have been created with voice recognition software  Occasional wrong word or "sound a like" substitutions may have occurred due to the inherent limitations of voice recognition software    Read the chart carefully and recognize, using context, where substitutions have occurred

## 2022-07-28 NOTE — CASE MANAGEMENT
Case Management Discharge Planning Note    Patient name Rip [facility]  Location MICU 13/MICU 13 MRN [id number]  : 1945 Date 2022       Current Admission Date: 2022  Current Admission Diagnosis:Gram-positive bacteremia   Patient Active Problem List    Diagnosis Date Noted    Gram-positive bacteremia 2022    Severe protein-calorie malnutrition ([address] ) 2022    Bilateral leg edema 2022    Ambulatory dysfunction 2022    Acute blood loss anemia 2022    Gastric ulcer 2022    Fever 2022    Anemia 2022    Dyspnea on exertion 2022    Generalized weakness 2022    Hyponatremia 2022    Abnormal CT scan 2022    H/O bariatric surgery - bypass 2022    Cerumen debris on tympanic membrane of right ear 2022    Nasal congestion 2022    Bilateral hearing loss 2022    Fibromyalgia syndrome 2021    Osteopenia of both forearms 2021    Medicare annual wellness visit, subsequent 2021    Shortness of breath 2021    Acute bronchitis 2021    COVID-19 2021    Dysphasia 2020    Epigastric pain 2020    Hypothyroidism 2020    Gastroesophageal reflux disease without esophagitis 2020    Depression 2020    Fibromyalgia, primary 2020    Iron deficiency 2020    Numbness of foot 2020      LOS (days): 2  Geometric Mean LOS (GMLOS) (days): 4 80  Days to GMLOS:2 5     OBJECTIVE:  Risk of Unplanned Readmission Score: 21 85         Current admission status: Inpatient   Preferred Pharmacy:   [facility] [address] [facility], [address] [place] [id number]  Phone: [phone] Fax: [phone]    Primary Care Provider: Juan Antonio Rivera MD    Primary Insurance: [facility]  Secondary Insurance:     DISCHARGE DETAILS:    CM received TT from Resident Doctor Alix Aguilar regarding family considering other STR options rather than pt return to [address] [place] [address]; brother Mark Deras requested a call  CM attempted to contact Mark Deras ([phone]); voicemail full and unable to leave message  Will follow up with pt regarding choices  CM received return call from brother Mark Deras and discussed blanket referral to [facility] facilities can be made; pt and brother are in agreement  Referral [facility] is being requested

## 2022-07-28 NOTE — PROGRESS NOTES
Progress Note - Infectious Disease   Sophia Pac 76 y o  female MRN: [id number]  Unit/Bed#: [facility] 828-01 Encounter: [id number]      Impression/Recommendations:  1  Septic shock, present on admission, likely secondary to PICC related bacteremia  PICC has been removed  Patient is clinically much improved  She is now off pressors  Temperature is down  WBC normalized  She remains systemically well, without toxicity  Antibiotic plan as in below  Monitor temperature/WBC  Monitor hemodynamics     2  MSSE bacteremia, most likely secondary to PICC line, with patient on TPN  PICC has been removed  Patient is currently on IV vancomycin  2D echo without vegetation  Repeat blood culture obtained on 07/26 still has growth but this was drawn prior to PICC removal   Will continue treatment course and recheck blood culture today  Continue IV vancomycin  Monitor temperature/WBC  Monitor hemodynamics  Repeat blood cultures today      3  NOAH, present on admission, most likely secondary to septic shock  Creatinine is much improved  Antibiotic at full dose  Monitor creatinine      4  Small sacral decubitus ulcer, without evidence of cellulitis  Local ulcer care  Monitor      5  Gastric ulcer, stable on PPI      6  Severe protein calorie malnutrition, on outpatient TPN  Given septic shock of PICC origin, risks and benefits of continued TPN should be reassessed      7  Status post distant gastric bypass      Discussed with patient in detail regarding the above plan  Discussed with Critical Care Medicine Service earlier  Plan for transfer out of ICU noted  Antibiotics:  Vancomycin # 3   Post PICC removal # 2     Subjective:  Patient feels well  No specific complaints, other than mild chronic epigastric abdominal pain  Temperature is down  No chills  She is tolerating antibiotic well  No nausea, vomiting or diarrhea      Objective:  Vitals:  Temp:  [98 2 °F (36 8 °C)-99 3 °F (37 4 °C)] 98 2 °F (36 8 °C)  HR:  [64-90] 80  Resp:  [18-34] 31  BP: ()/(44-65) 94/51  SpO2:  [91 %-98 %] 97 %  Temp (24hrs), Av 8 °F (37 1 °C), Min:98 2 °F (36 8 °C), Max:99 3 °F (37 4 °C)  Current: Temperature: 98 2 °F (36 8 °C)    Physical Exam:     General: Awake, alert, cooperative, no distress  Neck:  Supple  No mass  No lymphadenopathy  Lungs: Expansion symmetric, no rales, no wheezing, respirations unlabored  Heart:  Regular rate and rhythm, S1 and S2 normal, no murmur  Abdomen: Soft, nondistended, stable mild epigastric tenderness, bowel sounds active all four quadrants, no masses, no organomegaly  Extremities: No edema  No erythema/warmth  No ulcer  Nontender to palpation  Skin:  No rash  Neuro: Moves all extremities  Invasive Devices  Report    Peripheral Intravenous Line  Duration           Peripheral IV 22 Right Antecubital 1 day    Peripheral IV 22 Right;Ventral (anterior) Forearm 1 day          Drain  Duration           External Urinary Catheter 1 day                Labs studies:   I have personally reviewed pertinent labs    Results from last 7 days   Lab Units 22  0550 22  0756 22  1901 22  0408 22  2221   POTASSIUM mmol/L 3 9  3 8 3 6 3 8 4 0 4 4   CHLORIDE mmol/L 112*  111* 108 107 106 105   CO2 mmol/L 23  23 21 19* 21 20*   BUN mg/dL 18  18 21 25 31* 30*   CREATININE mg/dL 0 97  0 89 0 99 1 20 1 25 1 16   EGFR ml/min/1 73sq m 55  61 54 43 41 44   CALCIUM mg/dL 8 6  8 5 8 3 8 2* 8 0* 8 4   AST U/L  --   --   --  41 35   ALT U/L  --   --   --  37 30   ALK PHOS U/L  --   --   --  83 100     Results from last 7 days   Lab Units 22  0550 22  0546 22  1225   WBC Thousand/uL 9 11 13 27* 22 15*   HEMOGLOBIN g/dL 7 3* 7 7* 7 6*   PLATELETS Thousands/uL 290 281 284     Results from last 7 days   Lab Units 22  1055 22  0506 22  0352 22  2239 22  2221   BLOOD CULTURE   --  Staphylococcus coagulase negative* --   --  Staphylococcus epidermidis*  Staphylococcus epidermidis*   GRAM STAIN RESULT   --  Gram positive cocci in clusters*  --   --  Gram positive cocci in clusters*  Gram positive cocci in clusters*   MRSA CULTURE ONLY   --   --  No Methicillin Resistant Staphlyococcus aureus (MRSA) isolated  --   --    LEGIONELLA URINARY ANTIGEN   --   --   --  Negative  --    C DIFF TOXIN B BY PCR  Negative  --   --   --   --        Imaging Studies:   I have personally reviewed pertinent imaging study reports and images in PACS  EKG, Pathology, and Other Studies:   I have personally reviewed pertinent reports

## 2022-07-28 NOTE — NUTRITION
07/28/22 1030   Recommendations/Interventions   Summary Received consult for TPN recommendations 7/26 with additional consult listed above 7/28  Pt with severe malnutrition  PICC line removed d/t bacteremia  Per discussion with attending there does not appear to be a plan to resume TPN in the near future  However, pt would benefit from resumption if malabsorption is an active problem  Even if po intakes improve, pt will not be able to meet her needs if she is not absorbing the nutrients  Interventions/Recommendations Initiate PN;Monitor I & O's;Initiate daily weight;Monitor labs for refeeding syndrome;Supplement continue;Supplement adjust  (once medically appropriate)   Recommendations to Provider 1  Recommend resuming TPN once medically appropriate  Start with standard TPN formula  2  RD to order ensure compact BID, adjust ensure enlive to once daily  3  Recommend daily liquid MVI with minerals and daily B complex   Appreciate consult

## 2022-07-28 NOTE — PROGRESS NOTES
Vancomycin IV Pharmacy-to-Dose Consultation    Rosa Sotelo is a 76 y o  female who is currently receiving vancomycin IV with management by the Pharmacy Consult service  Assessment/Plan:    The patient's chart was reviewed  Renal function is stable  There are no signs or symptoms of nephrotoxicity and/or infusion reactions documented  Based on today's assessment, will continue current vancomycin (Day # 3) dosing of 1000 mg (20 mg/kg) IV q24h, with a plan for trough to be drawn 7/29 at approximately 0830  We will continue to follow the patient's culture results and clinical progress daily      Thank you,  Guevara Mendoza, PharmD, [place] [address] Pharmacist  [phone]

## 2022-07-28 NOTE — CASE MANAGEMENT
Case Management Discharge Planning Note    Patient name Tracy Domingo  Location MICU 13/MICU 13 MRN [id number]  : 1945 Date 2022       Current Admission Date: 2022  Current Admission Diagnosis:Gram-positive bacteremia   Patient Active Problem List    Diagnosis Date Noted    Gram-positive bacteremia 2022    Severe protein-calorie malnutrition ([address] ) 2022    Bilateral leg edema 2022    Ambulatory dysfunction 2022    Acute blood loss anemia 2022    Gastric ulcer 2022    Fever 2022    Anemia 2022    Dyspnea on exertion 2022    Generalized weakness 2022    Hyponatremia 2022    Abnormal CT scan 2022    H/O bariatric surgery - bypass 2022    Cerumen debris on tympanic membrane of right ear 2022    Nasal congestion 2022    Bilateral hearing loss 2022    Fibromyalgia syndrome 2021    Osteopenia of both forearms 2021    Medicare annual wellness visit, subsequent 2021    Shortness of breath 2021    Acute bronchitis 2021    COVID-19 2021    Dysphasia 2020    Epigastric pain 2020    Hypothyroidism 2020    Gastroesophageal reflux disease without esophagitis 2020    Depression 2020    Fibromyalgia, primary 2020    Iron deficiency 2020    Numbness of foot 2020      LOS (days): 2  Geometric Mean LOS (GMLOS) (days): 4 80  Days to GMLOS:2 3     OBJECTIVE:  Risk of Unplanned Readmission Score: 23 29         Current admission status: Inpatient   Preferred Pharmacy:   [facility] [address] [facility], [address] [place] [id number]  Phone: [phone] Fax: [phone]    Primary Care Provider: Jessica Villarreal MD    Primary Insurance: [facility]  Secondary Insurance:     DISCHARGE DETAILS:    CM contacted pt's Arcadio ([phone]) and updated him that the requested referral to [address] is unable to meet pt's needs at d/c and other referrals are pending their decision at this time; brother acknowledged

## 2022-07-28 NOTE — PROGRESS NOTES
CRITICAL CARE TRANSFER NOTE     Name: Meryle Greet   Age & Sex: 76 y o  female   MRN: [id number]  Unit/Bed#: MICU 13   Encounter: [id number]  Hospital Stay Days: 2    Reason for ICU Admission: Septic shock   Code Status: Level 1 - Full Code  Condition: stable  Disposition: Patient requires Med/Surg    Accepting Physician: Nellie Boyle     ASSESSMENT/PLAN     Principal Problem:    Gram-positive bacteremia    No new Assessment & Plan notes have been filed under this hospital service since the last note was generated  Service: Critical Care/ICU       VTE Pharmacologic Prophylaxis: Heparin  VTE Mechanical Prophylaxis: sequential compression device and foot pump applied    Tal Patel is a 76 y o  female with a PMH significant for bipolar disorder, HTN, inflammatory polyneuropathy, 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, who presented to the ED on 7/25 from NH with a fever of 105 5F  She reported feeling cold and unwell for the past week or so, and the day before admission noted fevers with rigors and worsening fatigue  On admission to the ED she complained of polyuria, polydipsia, and chills, and it was discovered that she had a clean and dry, non-inflamed sacral wound  She was found to be hypotensive and met sepsis criteria at that time  Blood cultures and MRSA swab were obtained, crystalloid resuscitation was begun, and she was started on cefepime and vancomycin for broad-spectrum coverage; cefepime was discontinued on 7/27 when cultures returned positive for MSSE bacteremia, and she is finishing out her 7-day course of vancomycin per ID  Her last dose will be 8/1  She also required norepinephrine on admission for persistent hypotension, which was transitioned to midodrine on 7/27  Her PICC line was removed on 7/26 2/2 bacteremia, and subsequently her TPN has been held throughout the admission; she has been taking in food PO   Nutrition was consulted for severe malnourished; there is currently no plan for reinsertion of PICC line for TPN but she should continue to be monitored on the floor  Continue with supplemental diet at this time  OBJECTIVE     Vitals:    07/28/22 0900 07/28/22 0920 07/28/22 1000 07/28/22 1100   BP:  (!) 89/47     Pulse: 72  82 82   Resp: 22 18 18   Temp:       TempSrc:       SpO2: 96%  97% 96%   Weight:       Height:         I/O last 24 hours: In: 2214 2 [P O :300; I V :1314 2; IV Piggyback:600]  Out: 3750 [Urine:3750]    LABORATORY DATA     Labs: I have personally reviewed pertinent reports     and I have personally reviewed pertinent films in PACS    Results from last 7 days   Lab Units 07/28/22  0550 07/27/22  0546 07/26/22  1225   WBC Thousand/uL 9 11 13 27* 22 15*   HEMOGLOBIN g/dL 7 3* 7 7* 7 6*   HEMATOCRIT % 22 7* 23 9* 23 1*   PLATELETS Thousands/uL 290 281 284   NEUTROS PCT % 58 68 83*   MONOS PCT % 8 7 6      Results from last 7 days   Lab Units 07/28/22  0550 07/27/22  0756 07/26/22  1901 07/26/22  0408 07/25/22  2221   POTASSIUM mmol/L 3 9  3 8 3 6 3 8 4 0 4 4   CHLORIDE mmol/L 112*  111* 108 107 106 105   CO2 mmol/L 23  23 21 19* 21 20*   BUN mg/dL 18  18 21 25 31* 30*   CREATININE mg/dL 0 97  0 89 0 99 1 20 1 25 1 16   CALCIUM mg/dL 8 6  8 5 8 3 8 2* 8 0* 8 4   ALK PHOS U/L  --   --   --  83 100   ALT U/L  --   --   --  37 30   AST U/L  --   --   --  41 35     Results from last 7 days   Lab Units 07/28/22  0550 07/27/22  0756 07/26/22  1901   MAGNESIUM mg/dL 2 3  2 2 2 2 2 3     Results from last 7 days   Lab Units 07/28/22  0550 07/27/22  0756 07/26/22  1901   PHOSPHORUS mg/dL 3 2  3 0 3 2 3 6      Results from last 7 days   Lab Units 07/25/22  2221   INR  1 20*   PTT seconds 34     Results from last 7 days   Lab Units 07/25/22  2221   LACTIC ACID mmol/L 1 8         Micro:  Lab Results   Component Value Date    BLOODCX Staphylococcus coagulase negative (A) 07/26/2022    BLOODCX Staphylococcus coagulase negative (A) 07/25/2022    BLOODCX Staphylococcus epidermidis (A) 07/25/2022     IMAGING & DIAGNOSTIC TESTING     Imaging: I have personally reviewed pertinent reports  and I have personally reviewed pertinent films in PACS    XR chest 1 view portable    Result Date: 7/26/2022  Impression: No acute cardiopulmonary findings  Workstation performed: [id number]     EKG, Pathology, and Other Studies: I have personally reviewed pertinent reports       ALLERGIES   No Known Allergies    MEDICATIONS     Current Facility-Administered Medications   Medication Dose Route Frequency Provider Last Rate    acetaminophen  650 mg Oral Q6H PRN Dania Rajput MD      clonazePAM  3 mg Oral HS Blount Lob, DO      heparin (porcine)  5,000 Units Subcutaneous [facility] Jennifer, [address]      levothyroxine  112 mcg Oral Early Morning Dania Rajput MD      melatonin  6 mg Oral HS Dania Rajput MD      midodrine  5 mg Oral TID [facility], DO      miSOPROStol  200 mcg Oral 4x Daily (with meals and at bedtime) Dania Rajput MD      ondansetron  4 mg Intravenous Q8H PRN Earlean Divers, DO      pantoprazole  40 mg Oral BID AC [facility] Jennifer, TOLU      sucralfate  1 g Oral 4x Daily [facility]) Dania Rajput MD      traZODone  150 mg Oral HS [facility], DO      vancomycin  1,000 mg Intravenous Q24H Heidy Gallo MD Stopped (07/28/22 3350)        acetaminophen, 650 mg, Q6H PRN  ondansetron, 4 mg, Q8H PRN      ==

## 2022-07-28 NOTE — PLAN OF CARE
Problem: MOBILITY - ADULT  Goal: Maintain or return to baseline ADL function  Description: INTERVENTIONS:  -  Assess patient's ability to carry out ADLs; assess patient's baseline for ADL function and identify physical deficits which impact ability to perform ADLs (bathing, care of mouth/teeth, toileting, grooming, dressing, etc )  - Assess/evaluate cause of self-care deficits   - Assess range of motion  - Assess patient's mobility; develop plan if impaired  - Assess patient's need for assistive devices and provide as appropriate  - Encourage maximum independence but intervene and supervise when necessary  - Involve family in performance of ADLs  - Assess for home care needs following discharge   - Consider OT consult to assist with ADL evaluation and planning for discharge  - Provide patient education as appropriate  Outcome: Progressing  Goal: Maintains/Returns to pre admission functional level  Description: INTERVENTIONS:  - Perform BMAT or MOVE assessment daily    - Set and communicate daily mobility goal to care team and patient/family/caregiver  - Collaborate with rehabilitation services on mobility goals if consulted  - Reposition patient every 2hours    - Dangle patient 3 times a day  - Stand patient 3 times a day  - Ambulate patient 2 times a day  - Out of bed to chair 3times a day   - Out of bed for meals 3times a day  - Out of bed for toileting  - Record patient progress and toleration of activity level   Outcome: Progressing     Problem: INFECTION - ADULT  Goal: Absence or prevention of progression during hospitalization  Description: INTERVENTIONS:  - Assess and monitor for signs and symptoms of infection  - Monitor lab/diagnostic results  - Monitor all insertion sites, i e  indwelling lines, tubes, and drains  - Monitor endotracheal if appropriate and nasal secretions for changes in amount and color  - Brooks appropriate cooling/warming therapies per order  - Administer medications as ordered  - Instruct and encourage patient and family to use good hand hygiene technique  - Identify and instruct in appropriate isolation precautions for identified infection/condition  Outcome: Progressing  Goal: Absence of fever/infection during neutropenic period  Description: INTERVENTIONS:  - Monitor WBC    Outcome: Progressing     Problem: SAFETY ADULT  Goal: Maintain or return to baseline ADL function  Description: INTERVENTIONS:  -  Assess patient's ability to carry out ADLs; assess patient's baseline for ADL function and identify physical deficits which impact ability to perform ADLs (bathing, care of mouth/teeth, toileting, grooming, dressing, etc )  - Assess/evaluate cause of self-care deficits   - Assess range of motion  - Assess patient's mobility; develop plan if impaired  - Assess patient's need for assistive devices and provide as appropriate  - Encourage maximum independence but intervene and supervise when necessary  - Involve family in performance of ADLs  - Assess for home care needs following discharge   - Consider OT consult to assist with ADL evaluation and planning for discharge  - Provide patient education as appropriate  Outcome: Progressing  Goal: Maintains/Returns to pre admission functional level  Description: INTERVENTIONS:  - Perform BMAT or MOVE assessment daily    - Set and communicate daily mobility goal to care team and patient/family/caregiver  - Collaborate with rehabilitation services on mobility goals if consul  - Reposition patient every 2hours    - Dangle patient 3times a day  - Stand patient 3 times a day  - Ambulate patient 2 times a day  - Out of bed to chair 3 times a day   - Out of bed for meals 3times a day  - Out of bed for toileting  - Record patient progress and toleration of activity level   Outcome: Progressing  Goal: Patient will remain free of falls  Description: INTERVENTIONS:  - Educate patient/family on patient safety including physical limitations  - Instruct patient to call for assistance with activity   - Consult OT/PT to assist with strengthening/mobility   - Keep Call bell within reach  - Keep bed low and locked with side rails adjusted as appropriate  - Keep care items and personal belongings within reach  - Initiate and maintain comfort rounds  - Make Fall Risk Sign visible to staff  - Offer Toileting every 2Hours, in advance of needed  - Apply yellow socks and bracelet for high fall risk patients  - Consider moving patient to room near nurses station  Outcome: Progressing     Problem: DISCHARGE PLANNING  Goal: Discharge to home or other facility with appropriate resources  Description: INTERVENTIONS:  - Identify barriers to discharge w/patient and caregiver  - Arrange for needed discharge resources and transportation as appropriate  - Identify discharge learning needs (meds, wound care, etc )  - Arrange for interpretive services to assist at discharge as needed  - Refer to Case Management Department for coordinating discharge planning if the patient needs post-hospital services based on physician/advanced practitioner order or complex needs related to functional status, cognitive ability, or social support system  Outcome: Progressing     Problem: Prexisting or High Potential for Compromised Skin Integrity  Goal: Skin integrity is maintained or improved  Description: INTERVENTIONS:  - Identify patients at risk for skin breakdown  - Assess and monitor skin integrity  - Assess and monitor nutrition and hydration status  - Monitor labs   - Assess for incontinence   - Turn and reposition patient  - Assist with mobility/ambulation  - Relieve pressure over bony prominences  - Avoid friction and shearing  - Provide appropriate hygiene as needed including keeping skin clean and dry  - Evaluate need for skin moisturizer/barrier cream  - Collaborate with interdisciplinary team   - Patient/family teaching  - Consider wound care consult   Outcome: Progressing     Problem: Nutrition/Hydration-ADULT  Goal: Nutrient/Hydration intake appropriate for improving, restoring or maintaining nutritional needs  Description: Monitor and assess patient's nutrition/hydration status for malnutrition  Collaborate with interdisciplinary team and initiate plan and interventions as ordered  Monitor patient's weight and dietary intake as ordered or per policy  Utilize nutrition screening tool and intervene as necessary  Determine patient's food preferences and provide high-protein, high-caloric foods as appropriate       INTERVENTIONS:  - Monitor oral intake, urinary output, labs, and treatment plans  - Assess nutrition and hydration status and recommend course of action  - Evaluate amount of meals eaten  - Assist patient with eating if necessary   - Allow adequate time for meals  - Recommend/ encourage appropriate diets, oral nutritional supplements, and vitamin/mineral supplements  - Order, calculate, and assess calorie counts as needed  - Recommend, monitor, and adjust tube feedings and TPN/PPN based on assessed needs  - Assess need for intravenous fluids  - Provide specific nutrition/hydration education as appropriate  - Include patient/family/caregiver in decisions related to nutrition  Outcome: Progressing     Problem: Potential for Falls  Goal: Patient will remain free of falls  Description: INTERVENTIONS:  - Educate patient/family on patient safety including physical limitations  - Instruct patient to call for assistance with activity   - Consult OT/PT to assist with strengthening/mobility   - Keep Call bell within reach  - Keep bed low and locked with side rails adjusted as appropriate  - Keep care items and personal belongings within reach  - Initiate and maintain comfort rounds  - Make Fall Risk Sign visible to staff  - Offer Toileting every 2Hours, in advance of need  - Initiate/Maintain alarm  - Obtain necessary fall risk management equipment:   - Apply yellow socks and bracelet for high fall risk patients  - Consider moving patient to room near nurses station  Outcome: Progressing

## 2022-07-28 NOTE — MALNUTRITION/BMI
This medical record reflects one or more clinical indicators suggestive of malnutrition   Malnutrition Findings:   Adult Malnutrition type: Chronic illness  Adult Degree of Malnutrition: Other severe protein calorie malnutrition  Malnutrition Characteristics: Fat loss, Muscle loss, Inadequate energy                  360 Statement: Severe malnutrition in setting of chronic illness related to malabsorption and poor po intake as evidenced by overt loss of body fat and muscle mass observed (severe orbital wasting , severe trapezius wasting, severe quadricep wasting), and pt reported poor po intakes  Pt very  loquacious  BMI Findings: Body mass index is 19 91 kg/m²  See Nutrition note dated 7/28 mfor additional details  Completed nutrition assessment is viewable in the nutrition documentation

## 2022-07-29 PROBLEM — L98.429 SACRAL ULCER (HCC): Status: ACTIVE | Noted: 2022-07-29

## 2022-07-29 PROBLEM — A41.9 SEPTIC SHOCK (HCC): Status: ACTIVE | Noted: 2022-07-29

## 2022-07-29 PROBLEM — R65.21 SEPTIC SHOCK (HCC): Status: ACTIVE | Noted: 2022-07-29

## 2022-07-29 LAB
ANION GAP SERPL CALCULATED.3IONS-SCNC: 5 MMOL/L (ref 4–13)
BACTERIA BLD CULT: ABNORMAL
BASOPHILS # BLD AUTO: 0.03 THOUSANDS/ΜL (ref 0–0.1)
BASOPHILS NFR BLD AUTO: 0 % (ref 0–1)
BUN SERPL-MCNC: 15 MG/DL (ref 5–25)
CALCIUM SERPL-MCNC: 8.5 MG/DL (ref 8.3–10.1)
CHLORIDE SERPL-SCNC: 110 MMOL/L (ref 96–108)
CO2 SERPL-SCNC: 23 MMOL/L (ref 21–32)
CREAT SERPL-MCNC: 1.28 MG/DL (ref 0.6–1.3)
EOSINOPHIL # BLD AUTO: 1.03 THOUSAND/ΜL (ref 0–0.61)
EOSINOPHIL NFR BLD AUTO: 10 % (ref 0–6)
ERYTHROCYTE [DISTWIDTH] IN BLOOD BY AUTOMATED COUNT: 22 % (ref 11.6–15.1)
GFR SERPL CREATININE-BSD FRML MDRD: 39 ML/MIN/1.73SQ M
GLUCOSE SERPL-MCNC: 119 MG/DL (ref 65–140)
GRAM STN SPEC: ABNORMAL
HCT VFR BLD AUTO: 24.1 % (ref 34.8–46.1)
HGB BLD-MCNC: 7.7 G/DL (ref 11.5–15.4)
IMM GRANULOCYTES # BLD AUTO: 0.12 THOUSAND/UL (ref 0–0.2)
IMM GRANULOCYTES NFR BLD AUTO: 1 % (ref 0–2)
LYMPHOCYTES # BLD AUTO: 2.73 THOUSANDS/ΜL (ref 0.6–4.47)
LYMPHOCYTES NFR BLD AUTO: 27 % (ref 14–44)
MAGNESIUM SERPL-MCNC: 2 MG/DL (ref 1.6–2.6)
MCH RBC QN AUTO: 30.2 PG (ref 26.8–34.3)
MCHC RBC AUTO-ENTMCNC: 32 G/DL (ref 31.4–37.4)
MCV RBC AUTO: 95 FL (ref 82–98)
MONOCYTES # BLD AUTO: 0.92 THOUSAND/ΜL (ref 0.17–1.22)
MONOCYTES NFR BLD AUTO: 9 % (ref 4–12)
NEUTROPHILS # BLD AUTO: 5.23 THOUSANDS/ΜL (ref 1.85–7.62)
NEUTS SEG NFR BLD AUTO: 53 % (ref 43–75)
NRBC BLD AUTO-RTO: 0 /100 WBCS
PHOSPHATE SERPL-MCNC: 2.6 MG/DL (ref 2.3–4.1)
PLATELET # BLD AUTO: 328 THOUSANDS/UL (ref 149–390)
PMV BLD AUTO: 10.1 FL (ref 8.9–12.7)
POTASSIUM SERPL-SCNC: 3.6 MMOL/L (ref 3.5–5.3)
RBC # BLD AUTO: 2.55 MILLION/UL (ref 3.81–5.12)
S EPIDERMIDIS DNA BLD POS QL NAA+NON-PRB: DETECTED
SODIUM SERPL-SCNC: 138 MMOL/L (ref 135–147)
VANCOMYCIN TROUGH SERPL-MCNC: 17.6 UG/ML (ref 10–20)
WBC # BLD AUTO: 10.06 THOUSAND/UL (ref 4.31–10.16)

## 2022-07-29 PROCEDURE — 80048 BASIC METABOLIC PNL TOTAL CA: CPT | Performed by: STUDENT IN AN ORGANIZED HEALTH CARE EDUCATION/TRAINING PROGRAM

## 2022-07-29 PROCEDURE — 80202 ASSAY OF VANCOMYCIN: CPT | Performed by: STUDENT IN AN ORGANIZED HEALTH CARE EDUCATION/TRAINING PROGRAM

## 2022-07-29 PROCEDURE — 99232 SBSQ HOSP IP/OBS MODERATE 35: CPT | Performed by: INTERNAL MEDICINE

## 2022-07-29 PROCEDURE — 83735 ASSAY OF MAGNESIUM: CPT | Performed by: STUDENT IN AN ORGANIZED HEALTH CARE EDUCATION/TRAINING PROGRAM

## 2022-07-29 PROCEDURE — 99233 SBSQ HOSP IP/OBS HIGH 50: CPT | Performed by: INTERNAL MEDICINE

## 2022-07-29 PROCEDURE — 85025 COMPLETE CBC W/AUTO DIFF WBC: CPT | Performed by: STUDENT IN AN ORGANIZED HEALTH CARE EDUCATION/TRAINING PROGRAM

## 2022-07-29 PROCEDURE — 84100 ASSAY OF PHOSPHORUS: CPT | Performed by: STUDENT IN AN ORGANIZED HEALTH CARE EDUCATION/TRAINING PROGRAM

## 2022-07-29 RX ORDER — LOPERAMIDE HYDROCHLORIDE 2 MG/1
2 CAPSULE ORAL 3 TIMES DAILY PRN
Status: DISCONTINUED | OUTPATIENT
Start: 2022-07-29 | End: 2022-08-09 | Stop reason: HOSPADM

## 2022-07-29 RX ORDER — CEFAZOLIN SODIUM 2 G/50ML
2000 SOLUTION INTRAVENOUS EVERY 8 HOURS
Status: DISCONTINUED | OUTPATIENT
Start: 2022-07-29 | End: 2022-07-31

## 2022-07-29 RX ORDER — SACCHAROMYCES BOULARDII 250 MG
250 CAPSULE ORAL 2 TIMES DAILY
Status: DISCONTINUED | OUTPATIENT
Start: 2022-07-29 | End: 2022-08-09 | Stop reason: HOSPADM

## 2022-07-29 RX ADMIN — PANTOPRAZOLE SODIUM 40 MG: 40 TABLET, DELAYED RELEASE ORAL at 05:07

## 2022-07-29 RX ADMIN — TRAZODONE HYDROCHLORIDE 150 MG: 100 TABLET ORAL at 21:20

## 2022-07-29 RX ADMIN — LEVOTHYROXINE SODIUM 112 MCG: 112 TABLET ORAL at 05:07

## 2022-07-29 RX ADMIN — LOPERAMIDE HYDROCHLORIDE 2 MG: 2 CAPSULE ORAL at 12:55

## 2022-07-29 RX ADMIN — Medication 250 MG: at 10:51

## 2022-07-29 RX ADMIN — HEPARIN SODIUM 5000 UNITS: 5000 INJECTION INTRAVENOUS; SUBCUTANEOUS at 21:20

## 2022-07-29 RX ADMIN — VANCOMYCIN HYDROCHLORIDE 1000 MG: 1 INJECTION, SOLUTION INTRAVENOUS at 10:02

## 2022-07-29 RX ADMIN — MISOPROSTOL 200 MCG: 200 TABLET ORAL at 07:39

## 2022-07-29 RX ADMIN — MISOPROSTOL 200 MCG: 200 TABLET ORAL at 15:51

## 2022-07-29 RX ADMIN — SUCRALFATE 1 G: 1 TABLET ORAL at 21:21

## 2022-07-29 RX ADMIN — ONDANSETRON 4 MG: 2 INJECTION INTRAMUSCULAR; INTRAVENOUS at 12:55

## 2022-07-29 RX ADMIN — MIDODRINE HYDROCHLORIDE 5 MG: 5 TABLET ORAL at 15:50

## 2022-07-29 RX ADMIN — MISOPROSTOL 200 MCG: 200 TABLET ORAL at 12:55

## 2022-07-29 RX ADMIN — PANTOPRAZOLE SODIUM 40 MG: 40 TABLET, DELAYED RELEASE ORAL at 15:50

## 2022-07-29 RX ADMIN — HEPARIN SODIUM 5000 UNITS: 5000 INJECTION INTRAVENOUS; SUBCUTANEOUS at 05:07

## 2022-07-29 RX ADMIN — ACETAMINOPHEN 650 MG: 325 TABLET ORAL at 05:07

## 2022-07-29 RX ADMIN — MIDODRINE HYDROCHLORIDE 5 MG: 5 TABLET ORAL at 10:51

## 2022-07-29 RX ADMIN — Medication 6 MG: at 21:21

## 2022-07-29 RX ADMIN — MISOPROSTOL 200 MCG: 200 TABLET ORAL at 21:20

## 2022-07-29 RX ADMIN — CEFAZOLIN SODIUM 2000 MG: 2 SOLUTION INTRAVENOUS at 13:19

## 2022-07-29 RX ADMIN — MIDODRINE HYDROCHLORIDE 5 MG: 5 TABLET ORAL at 05:07

## 2022-07-29 RX ADMIN — SUCRALFATE 1 G: 1 TABLET ORAL at 10:51

## 2022-07-29 RX ADMIN — HEPARIN SODIUM 5000 UNITS: 5000 INJECTION INTRAVENOUS; SUBCUTANEOUS at 13:19

## 2022-07-29 RX ADMIN — SUCRALFATE 1 G: 1 TABLET ORAL at 05:07

## 2022-07-29 RX ADMIN — CLONAZEPAM 3 MG: 1 TABLET ORAL at 21:20

## 2022-07-29 RX ADMIN — SUCRALFATE 1 G: 1 TABLET ORAL at 15:50

## 2022-07-29 RX ADMIN — Medication 250 MG: at 18:25

## 2022-07-29 RX ADMIN — CEFAZOLIN SODIUM 2000 MG: 2 SOLUTION INTRAVENOUS at 21:21

## 2022-07-29 NOTE — PROGRESS NOTES
Progress Note - Infectious Disease   Christy Beach 76 y o  female MRN: [id number]  Unit/Bed#: [facility] 828-01 Encounter: [id number]      Impression/Recommendations:  1  Septic shock, present on admission, likely secondary to PICC related bacteremia  [facility] has been removed   Patient is clinically much improved  She is now off pressors and out of ICU   Temperature is down   WBC normalized   She remains systemically well, without toxicity  Antibiotic plan as in below  Monitor temperature/WBC  Monitor hemodynamics     2  MSSE bacteremia, most likely secondary to PICC line, with patient on TPN  [facility] has been removed  Aleisha Palmer is currently on IV vancomycin   2D echo without vegetation   Repeat blood culture obtained on 07/26 still has growth but this was drawn prior to PICC removal   Repeat blood cultures on 07/28 have no growth thus far  Change antibiotic to IV cefazolin  Monitor temperature/WBC  Monitor hemodynamics  Follow-up repeat blood culture 7/26  Treat x7 days post PICC removal, through 8/2      3  NOAH, present on admission, most likely secondary to septic shock   Creatinine is much improved  Antibiotic at full dose  Monitor creatinine      4  Small sacral decubitus ulcer, without evidence of cellulitis  Local ulcer care  Monitor      5  Gastric ulcer, stable on PPI      6  Severe protein calorie malnutrition, on outpatient TPN   Given septic shock of PICC origin, risks and benefits of continued TPN should be reassessed      7  Status post distant gastric bypass      Discussed with patient in detail regarding the above plan      Antibiotics:  Vancomycin # 4   Post PICC removal # 3      Subjective:  Patient is out of ICU  She feels well  No specific complaints, other than mild chronic epigastric abdominal pain  Temperature is down  No chills  She is tolerating antibiotic well    No nausea, vomiting or diarrhea      Objective:  Vitals:  Temp:  [98 2 °F (36 8 °C)-99 7 °F (37 6 °C)] 98 2 °F (36 8 °C)  HR:  [70-88] 70  Resp:  [18-31] 20  BP: (94-99)/(49-56) 98/52  SpO2:  [91 %-98 %] 96 %  Temp (24hrs), Av 1 °F (37 3 °C), Min:98 2 °F (36 8 °C), Max:99 7 °F (37 6 °C)  Current: Temperature: 98 2 °F (36 8 °C)    Physical Exam:     General: Awake, alert, cooperative, no distress  Neck:  Supple  No mass  No lymphadenopathy  Lungs: Expansion symmetric, no rales, no wheezing, respirations unlabored  Heart:  Regular rate and rhythm, S1 and S2 normal, no murmur  Abdomen: Soft, nondistended, stable mild epigastric tenderness, bowel sounds active all four quadrants, no masses, no organomegaly  Extremities: No edema  No erythema/warmth  No ulcer  Nontender to palpation  Skin:  No rash  Neuro: Moves all extremities  Invasive Devices  Report    Peripheral Intravenous Line  Duration           Peripheral IV 22 Right Antecubital 1 day    Peripheral IV 22 Right;Ventral (anterior) Forearm 1 day          Drain  Duration           External Urinary Catheter 1 day                Labs studies:   I have personally reviewed pertinent labs  Results from last 7 days   Lab Units 22  0835 22  0550 22  0756 22  1901 22  0408 22  2221   POTASSIUM mmol/L 3 6 3 9  3 8 3 6   < > 4 0 4 4   CHLORIDE mmol/L 110* 112*  111* 108   < > 106 105   CO2 mmol/L 23 23  23 21   < > 21 20*   BUN mg/dL 15 18  18 21   < > 31* 30*   CREATININE mg/dL 1 28 0 97  0 89 0 99   < > 1 25 1 16   EGFR ml/min/1 73sq m 39 55  61 54   < > 41 44   CALCIUM mg/dL 8 5 8 6  8 5 8 3   < > 8 0* 8 4   AST U/L  --   --   --   --  41 35   ALT U/L  --   --   --   --  37 30   ALK PHOS U/L  --   --   --   --  83 100    < > = values in this interval not displayed       Results from last 7 days   Lab Units 22  0835 22  0550 22  0546   WBC Thousand/uL 10 06 9 11 13 27*   HEMOGLOBIN g/dL 7 7* 7 3* 7 7*   PLATELETS Thousands/uL 328 290 281     Results from last 7 days   Lab Units 07/28/22  1502 07/26/22  1055 07/26/22  0506 07/26/22  0352 07/25/22  2239 07/25/22  2221   BLOOD CULTURE  Received in Microbiology Lab  Culture in Progress  Received in Microbiology Lab  Culture in Progress  --  Staphylococcus coagulase negative*  --   --  Staphylococcus epidermidis*  Staphylococcus epidermidis*   GRAM STAIN RESULT   --   --  Gram positive cocci in clusters*  --   --  Gram positive cocci in clusters*  Gram positive cocci in clusters*   MRSA CULTURE ONLY   --   --   --  No Methicillin Resistant Staphlyococcus aureus (MRSA) isolated  --   --    LEGIONELLA URINARY ANTIGEN   --   --   --   --  Negative  --    C DIFF TOXIN B BY PCR   --  Negative  --   --   --   --        Imaging Studies:   I have personally reviewed pertinent imaging study reports and images in PACS  EKG, Pathology, and Other Studies:   I have personally reviewed pertinent reports

## 2022-07-29 NOTE — PLAN OF CARE
Problem: MOBILITY - ADULT  Goal: Maintain or return to baseline ADL function  Description: INTERVENTIONS:  -  Assess patient's ability to carry out ADLs; assess patient's baseline for ADL function and identify physical deficits which impact ability to perform ADLs (bathing, care of mouth/teeth, toileting, grooming, dressing, etc )  - Assess/evaluate cause of self-care deficits   - Assess range of motion  - Assess patient's mobility; develop plan if impaired  - Assess patient's need for assistive devices and provide as appropriate  - Encourage maximum independence but intervene and supervise when necessary  - Involve family in performance of ADLs  - Assess for home care needs following discharge   - Consider OT consult to assist with ADL evaluation and planning for discharge  - Provide patient education as appropriate  Outcome: Progressing  Goal: Maintains/Returns to pre admission functional level  Description: INTERVENTIONS:  - Perform BMAT or MOVE assessment daily    - Set and communicate daily mobility goal to care team and patient/family/caregiver     - Collaborate with rehabilitation services on mobility goals if consulted  - Out of bed for toileting  - Record patient progress and toleration of activity level   Outcome: Progressing     Problem: INFECTION - ADULT  Goal: Absence or prevention of progression during hospitalization  Description: INTERVENTIONS:  - Assess and monitor for signs and symptoms of infection  - Monitor lab/diagnostic results  - Monitor all insertion sites, i e  indwelling lines, tubes, and drains  - Monitor endotracheal if appropriate and nasal secretions for changes in amount and color  - [place] appropriate cooling/warming therapies per order  - Administer medications as ordered  - Instruct and encourage patient and family to use good hand hygiene technique  - Identify and instruct in appropriate isolation precautions for identified infection/condition  Outcome: Progressing  Goal: Absence of fever/infection during neutropenic period  Description: INTERVENTIONS:  - Monitor WBC    Outcome: Progressing     Problem: SAFETY ADULT  Goal: Maintain or return to baseline ADL function  Description: INTERVENTIONS:  -  Assess patient's ability to carry out ADLs; assess patient's baseline for ADL function and identify physical deficits which impact ability to perform ADLs (bathing, care of mouth/teeth, toileting, grooming, dressing, etc )  - Assess/evaluate cause of self-care deficits   - Assess range of motion  - Assess patient's mobility; develop plan if impaired  - Assess patient's need for assistive devices and provide as appropriate  - Encourage maximum independence but intervene and supervise when necessary  - Involve family in performance of ADLs  - Assess for home care needs following discharge   - Consider OT consult to assist with ADL evaluation and planning for discharge  - Provide patient education as appropriate  Outcome: Progressing  Goal: Maintains/Returns to pre admission functional level  Description: INTERVENTIONS:  - Perform BMAT or MOVE assessment daily    - Set and communicate daily mobility goal to care team and patient/family/caregiver     - Collaborate with rehabilitation services on mobility goals if consulted  - Out of bed for toileting  - Record patient progress and toleration of activity level   Outcome: Progressing  Goal: Patient will remain free of falls  Description: INTERVENTIONS:  - Educate patient/family on patient safety including physical limitations  - Instruct patient to call for assistance with activity   - Consult OT/PT to assist with strengthening/mobility   - Keep Call bell within reach  - Keep bed low and locked with side rails adjusted as appropriate  - Keep care items and personal belongings within reach  - Initiate and maintain comfort rounds  - Make Fall Risk Sign visible to staff  - Apply yellow socks and bracelet for high fall risk patients  - Consider moving patient to room near nurses station  Outcome: Progressing     Problem: DISCHARGE PLANNING  Goal: Discharge to home or other facility with appropriate resources  Description: INTERVENTIONS:  - Identify barriers to discharge w/patient and caregiver  - Arrange for needed discharge resources and transportation as appropriate  - Identify discharge learning needs (meds, wound care, etc )  - Arrange for interpretive services to assist at discharge as needed  - Refer to Case Management Department for coordinating discharge planning if the patient needs post-hospital services based on physician/advanced practitioner order or complex needs related to functional status, cognitive ability, or social support system  Outcome: Progressing     Problem: Prexisting or High Potential for Compromised Skin Integrity  Goal: Skin integrity is maintained or improved  Description: INTERVENTIONS:  - Identify patients at risk for skin breakdown  - Assess and monitor skin integrity  - Assess and monitor nutrition and hydration status  - Monitor labs   - Assess for incontinence   - Turn and reposition patient  - Assist with mobility/ambulation  - Relieve pressure over bony prominences  - Avoid friction and shearing  - Provide appropriate hygiene as needed including keeping skin clean and dry  - Evaluate need for skin moisturizer/barrier cream  - Collaborate with interdisciplinary team   - Patient/family teaching  - Consider wound care consult   Outcome: Progressing     Problem: Nutrition/Hydration-ADULT  Goal: Nutrient/Hydration intake appropriate for improving, restoring or maintaining nutritional needs  Description: Monitor and assess patient's nutrition/hydration status for malnutrition  Collaborate with interdisciplinary team and initiate plan and interventions as ordered  Monitor patient's weight and dietary intake as ordered or per policy  Utilize nutrition screening tool and intervene as necessary   Determine patient's food preferences and provide high-protein, high-caloric foods as appropriate       INTERVENTIONS:  - Monitor oral intake, urinary output, labs, and treatment plans  - Assess nutrition and hydration status and recommend course of action  - Evaluate amount of meals eaten  - Assist patient with eating if necessary   - Allow adequate time for meals  - Recommend/ encourage appropriate diets, oral nutritional supplements, and vitamin/mineral supplements  - Order, calculate, and assess calorie counts as needed  - Recommend, monitor, and adjust tube feedings and TPN/PPN based on assessed needs  - Assess need for intravenous fluids  - Provide specific nutrition/hydration education as appropriate  - Include patient/family/caregiver in decisions related to nutrition  Outcome: Progressing     Problem: Potential for Falls  Goal: Patient will remain free of falls  Description: INTERVENTIONS:  - Educate patient/family on patient safety including physical limitations  - Instruct patient to call for assistance with activity   - Consult OT/PT to assist with strengthening/mobility   - Keep Call bell within reach  - Keep bed low and locked with side rails adjusted as appropriate  - Keep care items and personal belongings within reach  - Initiate and maintain comfort rounds  - Make Fall Risk Sign visible to staff  - Apply yellow socks and bracelet for high fall risk patients  - Consider moving patient to room near nurses station  Outcome: Progressing

## 2022-07-29 NOTE — ASSESSMENT & PLAN NOTE
Present on admission  Likely secondary to PICC line related bacteremia  PICC line has been removed  Currently off pressors  White blood cells has normalized  IV antibiotic per ID

## 2022-07-29 NOTE — PROGRESS NOTES
Vancomycin IV Pharmacy-to-Dose Consultation    Sophia Mae is a 76 y o  female who is currently receiving vancomycin IV with management by the Pharmacy Consult service  Assessment/Plan:    The patient's chart was reviewed  Renal function is stable  There are no signs or symptoms of nephrotoxicity and/or infusion reactions documented  Based on today's assessment, continue dosing of vancomycin 1 gm q 24 hr (Day # 4)  at 20 mg/kg IV  Even though trough was within normal range of 15-20 at 17 6, doses were not given on time so plan for another trough to be drawn on Mon 7/29 at approximately 0830  We will continue to follow the patient's culture results and clinical progress daily  Drake NI   Ph  Pharmacist

## 2022-07-29 NOTE — PROGRESS NOTES
Pastoral Care Progress Note    2022  Patient: Jimena Sandhu : 1945  Admission Date & Time: 2022  MRN: [id number] [facility]: [id number]      Fr Georgette Ramos visited patient, provided anointing and blessing      22 1400   Clinical Encounter Type   Visited With Patient   Routine Visit Introduction   Mandaeism Encounters   Mandaeism Needs Prayer   Sacramental Encounters   Sacrament of Sick-Anointing Anointed

## 2022-07-29 NOTE — ASSESSMENT & PLAN NOTE
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

## 2022-07-29 NOTE — PLAN OF CARE
Problem: INFECTION - ADULT  Goal: Absence or prevention of progression during hospitalization  Description: INTERVENTIONS:  - Assess and monitor for signs and symptoms of infection  - Monitor lab/diagnostic results  - Monitor all insertion sites, i e  indwelling lines, tubes, and drains  - Monitor endotracheal if appropriate and nasal secretions for changes in amount and color  - [place] appropriate cooling/warming therapies per order  - Administer medications as ordered  - Instruct and encourage patient and family to use good hand hygiene technique  - Identify and instruct in appropriate isolation precautions for identified infection/condition  Outcome: Progressing  Goal: Absence of fever/infection during neutropenic period  Description: INTERVENTIONS:  - Monitor WBC    Outcome: Progressing     Problem: Nutrition/Hydration-ADULT  Goal: Nutrient/Hydration intake appropriate for improving, restoring or maintaining nutritional needs  Description: Monitor and assess patient's nutrition/hydration status for malnutrition  Collaborate with interdisciplinary team and initiate plan and interventions as ordered  Monitor patient's weight and dietary intake as ordered or per policy  Utilize nutrition screening tool and intervene as necessary  Determine patient's food preferences and provide high-protein, high-caloric foods as appropriate       INTERVENTIONS:  - Monitor oral intake, urinary output, labs, and treatment plans  - Assess nutrition and hydration status and recommend course of action  - Evaluate amount of meals eaten  - Assist patient with eating if necessary   - Allow adequate time for meals  - Recommend/ encourage appropriate diets, oral nutritional supplements, and vitamin/mineral supplements  - Order, calculate, and assess calorie counts as needed  - Recommend, monitor, and adjust tube feedings and TPN/PPN based on assessed needs  - Assess need for intravenous fluids  - Provide specific nutrition/hydration education as appropriate  - Include patient/family/caregiver in decisions related to nutrition  Outcome: Progressing

## 2022-07-29 NOTE — ASSESSMENT & PLAN NOTE
Malnutrition Findings:   Adult Malnutrition type: Chronic illness  Adult Degree of Malnutrition: Other severe protein calorie malnutrition  Malnutrition Characteristics: Fat loss, Muscle loss, Inadequate energy                  360 Statement: Severe malnutrition in setting of chronic illness related to malabsorption and poor po intake as evidenced by overt loss of body fat and muscle mass observed (severe orbital wasting , severe trapezius wasting, severe quadricep wasting), and pt reported poor po intakes  Pt very  loquacious  BMI Findings: Body mass index is 19 91 kg/m²       Patient on chronic TPN post remote gastric bypass  Currently TPN on hold  Continue with oral diet  Nutrition recommending resuming TPN once medically appropriate to me daily needs

## 2022-07-29 NOTE — NUTRITION
07/29/22 1436   Recommendations/Interventions   Summary Consult for calorie count recieved  Observed lunch untouched  Pt reports that she does not have an appetite  Reviewed [place] [address] regarding nutrition  Pt stated that the doctors did not want her to have a PICC line  Reviewed PICC vs J-tube placement with patient  Left contact information with patient and encouraged her to have her family contact me with any questions  Per patient every time she eats she has diarrhea  Pt is severely malnourished with malabsorption  Per documentation pt is having watery BM's  Calorie count is not necessary as patient has not been eating since admission  Recommend TPN  Would initiate pt on standard TPN

## 2022-07-29 NOTE — ASSESSMENT & PLAN NOTE
MSSE bacteremia  Likely secondary to PICC line, patient was on TPN  PICC line has been removed  2D echo without vegetation  On IV vancomycin per ID  Follow on repeat blood culture from 7/26

## 2022-07-29 NOTE — PROGRESS NOTES
Vancomycin IV Pharmacy-to-Dose Consultation    Kamryn Booker is a 76 y o  female who is currently receiving vancomycin IV with management by the Pharmacy Consult service  Assessment/Plan:    The patient's chart was reviewed  Renal function is stable  There are no signs or symptoms of nephrotoxicity and/or infusion reactions documented  Based on today's assessment, continue dosing of vancomycin 1 gm q 24 hr (Day # 4)  at 20 mg/kg IV  Even though trough was within normal range of 15-20 at 17 6, doses were not given on time so plan for another trough to be drawn on Mon 7/29 at approximately 0830  We will continue to follow the patient's culture results and clinical progress daily  Drake NI   Ph  Pharmacist

## 2022-07-29 NOTE — PROGRESS NOTES
[facility] [facility]  Progress Note - Conor Finch 1945, 76 y o  female MRN: [id number]  Unit/Bed#: [facility] 828-01 Encounter: [id number]  Primary Care Provider: Lanette Burnhma MD   Date and time admitted to hospital: 7/25/2022 10:04 PM    * Septic shock [facility])  Assessment & Plan  Present on admission  Likely secondary to PICC line related bacteremia  PICC line has been removed  Currently off pressors  White blood cells has normalized  IV antibiotic per ID    Gram-positive bacteremia  Assessment & Plan  MSSE bacteremia  Likely secondary to PICC line, patient was on TPN  PICC line has been removed  2D echo without vegetation  On IV vancomycin per ID  Follow on repeat blood culture from 7/26    Severe protein-calorie malnutrition ([facility] [address] )  Assessment & Plan  Malnutrition Findings:   Adult Malnutrition type: Chronic illness  Adult Degree of Malnutrition: Other severe protein calorie malnutrition  Malnutrition Characteristics: Fat loss, Muscle loss, Inadequate energy                  360 Statement: Severe malnutrition in setting of chronic illness related to malabsorption and poor po intake as evidenced by overt loss of body fat and muscle mass observed (severe orbital wasting , severe trapezius wasting, severe quadricep wasting), and pt reported poor po intakes  Pt very  loquacious  BMI Findings: Body mass index is 19 91 kg/m²       Patient on chronic TPN post remote gastric bypass  Currently TPN on hold  Continue with oral diet  Nutrition recommending resuming TPN once medically appropriate to me daily needs    Sacral ulcer ([facility] [address] )  Assessment & Plan  Sacral decubitus ulcer without evidence of cellulitis  Continue with local care    H/O bariatric surgery - bypass  Assessment & Plan  · 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 at [facility]  · Outpatient bariatric follow-up      Anemia  Assessment & Plan  Anemia of chronic disease  Status post blood transfusion   Monitor    Depression  Assessment & Plan  Continue home med of trazodone and Klonopin    Hypothyroidism  Assessment & Plan  Continue levothyroxine      VTE Pharmacologic Prophylaxis:   High Risk (Score >/= 5) - Pharmacological DVT Prophylaxis Ordered: heparin  Sequential Compression Devices Ordered  Patient Centered Rounds: I performed bedside rounds with nursing staff today  Discussions with Specialists or Other Care Team Provider:     Education and Discussions with Family / Patient: Updated  (sister) via phone  Time Spent for Care: 45 minutes  More than 50% of total time spent on counseling and coordination of care as described above  Current Length of Stay: 3 day(s)  Current Patient Status: Inpatient   Certification Statement: The patient will continue to require additional inpatient hospital stay due to Management of septic shock and bacteremia  Discharge Plan: Anticipate discharge in >72 hrs to rehab facility  Code Status: Level 1 - Full Code    Subjective:   Patient seen and examined  Comfortable in bed  Diarrhea last night  No chest pain or shortness of breath  ICU chart review    Objective:     Vitals:   Temp (24hrs), Av 6 °F (37 6 °C), Min:99 4 °F (37 4 °C), Max:99 7 °F (37 6 °C)    Temp:  [99 4 °F (37 4 °C)-99 7 °F (37 6 °C)] 99 4 °F (37 4 °C)  HR:  [70-88] 86  Resp:  [18-31] 28  BP: (84-99)/(49-60) 98/52  SpO2:  [91 %-98 %] 93 %  Body mass index is 19 91 kg/m²  Input and Output Summary (last 24 hours):      Intake/Output Summary (Last 24 hours) at 2022 1011  Last data filed at 2022 0900  Gross per 24 hour   Intake 180 ml   Output 400 ml   Net -220 ml       Physical Exam:   Physical Exam   Patient is awake alert in no acute distress  Cachectic, chronically ill-appearing  Lung clear to auscultation bilateral anteriorly  Heart positive S1-S2 no murmur  Abdomen soft nontender  Lower extremities no edema    Additional Data:     Labs:  Results from last 7 days Lab Units 07/29/22  0835   WBC Thousand/uL 10 06   HEMOGLOBIN g/dL 7 7*   HEMATOCRIT % 24 1*   PLATELETS Thousands/uL 328   NEUTROS PCT % 53   LYMPHS PCT % 27   MONOS PCT % 9   EOS PCT % 10*     Results from last 7 days   Lab Units 07/29/22  0835 07/26/22  1901 07/26/22  0408   SODIUM mmol/L 138   < > 134*   POTASSIUM mmol/L 3 6   < > 4 0   CHLORIDE mmol/L 110*   < > 106   CO2 mmol/L 23   < > 21   BUN mg/dL 15   < > 31*   CREATININE mg/dL 1 28   < > 1 25   ANION GAP mmol/L 5   < > 7   CALCIUM mg/dL 8 5   < > 8 0*   ALBUMIN g/dL  --   --  1 8*   TOTAL BILIRUBIN mg/dL  --   --  0 35   ALK PHOS U/L  --   --  83   ALT U/L  --   --  37   AST U/L  --   --  41   GLUCOSE RANDOM mg/dL 119   < > 107    < > = values in this interval not displayed  Results from last 7 days   Lab Units 07/25/22  2221   INR  1 20*             Results from last 7 days   Lab Units 07/25/22  2221   LACTIC ACID mmol/L 1 8   PROCALCITONIN ng/ml 0 60*       Lines/Drains:  Invasive Devices  Report    Peripheral Intravenous Line  Duration           Peripheral IV 07/27/22 Right Antecubital 1 day    Peripheral IV 07/27/22 Right;Ventral (anterior) Forearm 1 day          Drain  Duration           External Urinary Catheter 1 day                      Imaging: No pertinent imaging reviewed  Recent Cultures (last 7 days):   Results from last 7 days   Lab Units 07/28/22  1502 07/26/22  1055 07/26/22  0506 07/25/22  2239 07/25/22  2221   BLOOD CULTURE  Received in Microbiology Lab  Culture in Progress  Received in Microbiology Lab  Culture in Progress    --  Staphylococcus coagulase negative*  --  Staphylococcus epidermidis*  Staphylococcus epidermidis*   GRAM STAIN RESULT   --   --  Gram positive cocci in clusters*  --  Gram positive cocci in clusters*  Gram positive cocci in clusters*   LEGIONELLA URINARY ANTIGEN   --   --   --  Negative  --    C DIFF TOXIN B BY PCR   --  Negative  --   --   --        Last 24 Hours Medication List:   Current Facility-Administered Medications   Medication Dose Route Frequency Provider Last Rate    acetaminophen  650 mg Oral Q6H PRN Erin Almanza, DO      clonazePAM  3 mg Oral HS Mario Thurston, DO      heparin (porcine)  5,000 Units Subcutaneous Q8H [address] Erin Nehemiaheting, DO      levothyroxine  112 mcg Oral Early Morning Erin Almanza, DO      loperamide  2 mg Oral TID PRN Vidal Rangel,       melatonin  6 mg Oral HS Marioricky Thurston, DO      midodrine  5 mg Oral TID AC Mario Thurston, DO      miSOPROStol  200 mcg Oral 4x Daily (with meals and at bedtime) Erin Almanza, DO      ondansetron  4 mg Intravenous Q8H PRN Erin Almanza, DO      pantoprazole  40 mg Oral BID AC Mario Thurston, DO      saccharomyces boulardii  250 mg Oral BID Vidal Rangel,       sucralfate  1 g Oral 4x Daily (AC & HS) Erin Almanza, DO      traZODone  150 mg Oral HS Mario Thurston, DO      vancomycin  1,000 mg Intravenous Q24H Erin Almanza, DO 1,000 mg (07/29/22 1002)        Today, Patient Was Seen By: Vidal Rangel DO    **Please Note: This note may have been constructed using a voice recognition system  **

## 2022-07-30 ENCOUNTER — APPOINTMENT (INPATIENT)
Dept: RADIOLOGY | Facility: HOSPITAL | Age: 77
DRG: 314 | End: 2022-07-30
Payer: COMMERCIAL

## 2022-07-30 LAB
BACTERIA BLD CULT: ABNORMAL
BACTERIA BLD CULT: ABNORMAL
GRAM STN SPEC: ABNORMAL
GRAM STN SPEC: ABNORMAL

## 2022-07-30 PROCEDURE — 99233 SBSQ HOSP IP/OBS HIGH 50: CPT | Performed by: INTERNAL MEDICINE

## 2022-07-30 PROCEDURE — 99232 SBSQ HOSP IP/OBS MODERATE 35: CPT | Performed by: INTERNAL MEDICINE

## 2022-07-30 PROCEDURE — 71045 X-RAY EXAM CHEST 1 VIEW: CPT

## 2022-07-30 RX ORDER — ACETAMINOPHEN 325 MG/1
650 TABLET ORAL EVERY 4 HOURS PRN
Status: DISCONTINUED | OUTPATIENT
Start: 2022-07-30 | End: 2022-08-09 | Stop reason: HOSPADM

## 2022-07-30 RX ORDER — SODIUM CHLORIDE 9 MG/ML
75 INJECTION, SOLUTION INTRAVENOUS CONTINUOUS
Status: DISCONTINUED | OUTPATIENT
Start: 2022-07-30 | End: 2022-08-04

## 2022-07-30 RX ADMIN — ACETAMINOPHEN 650 MG: 325 TABLET ORAL at 14:40

## 2022-07-30 RX ADMIN — HEPARIN SODIUM 5000 UNITS: 5000 INJECTION INTRAVENOUS; SUBCUTANEOUS at 21:57

## 2022-07-30 RX ADMIN — HEPARIN SODIUM 5000 UNITS: 5000 INJECTION INTRAVENOUS; SUBCUTANEOUS at 14:26

## 2022-07-30 RX ADMIN — CEFAZOLIN SODIUM 2000 MG: 2 SOLUTION INTRAVENOUS at 21:56

## 2022-07-30 RX ADMIN — SODIUM CHLORIDE 1000 ML: 0.9 INJECTION, SOLUTION INTRAVENOUS at 23:00

## 2022-07-30 RX ADMIN — MISOPROSTOL 200 MCG: 200 TABLET ORAL at 12:25

## 2022-07-30 RX ADMIN — SUCRALFATE 1 G: 1 TABLET ORAL at 12:25

## 2022-07-30 RX ADMIN — ACETAMINOPHEN 650 MG: 325 TABLET ORAL at 21:57

## 2022-07-30 RX ADMIN — ACETAMINOPHEN 650 MG: 325 TABLET ORAL at 09:48

## 2022-07-30 RX ADMIN — SODIUM CHLORIDE 100 ML/HR: 0.9 INJECTION, SOLUTION INTRAVENOUS at 10:24

## 2022-07-30 RX ADMIN — CEFAZOLIN SODIUM 2000 MG: 2 SOLUTION INTRAVENOUS at 06:02

## 2022-07-30 RX ADMIN — SUCRALFATE 1 G: 1 TABLET ORAL at 21:56

## 2022-07-30 RX ADMIN — MIDODRINE HYDROCHLORIDE 5 MG: 5 TABLET ORAL at 12:25

## 2022-07-30 RX ADMIN — CEFAZOLIN SODIUM 2000 MG: 2 SOLUTION INTRAVENOUS at 12:29

## 2022-07-30 RX ADMIN — CLONAZEPAM 3 MG: 1 TABLET ORAL at 21:56

## 2022-07-30 RX ADMIN — MISOPROSTOL 200 MCG: 200 TABLET ORAL at 21:58

## 2022-07-30 RX ADMIN — TRAZODONE HYDROCHLORIDE 150 MG: 100 TABLET ORAL at 21:56

## 2022-07-30 RX ADMIN — Medication 6 MG: at 21:57

## 2022-07-30 NOTE — PROGRESS NOTES
Procedure: Procedure(s):  PHACOEMULSIFICATION CLEAR CORNEA WITH STANDARD INTRAOCULAR LENS IMPLANT  Preop diagnosis: NUCLEAR CATARACT RIGHT EYE    Allergies   Allergen Reactions     Contrast Dye Swelling     Tylenol [Acetaminophen]      Valium [Diazepam] Swelling     Swollen face       Past Medical History:   Diagnosis Date     ANCA-positive vasculitis (H)      Anxiety      Arthritis      Breast cancer (H)      CKD (chronic kidney disease)      Colon cancer (H)      Dyslipidemia      History of colon cancer      Hypertension      Vitamin D deficiency      Past Surgical History:   Procedure Laterality Date     BREAST SURGERY       GENITOURINARY SURGERY       GI SURGERY       GYN SURGERY       ORTHOPEDIC SURGERY       PHACOEMULSIFICATION CLEAR CORNEA WITH STANDARD INTRAOCULAR LENS IMPLANT Right 4/24/2018    Procedure: PHACOEMULSIFICATION CLEAR CORNEA WITH STANDARD INTRAOCULAR LENS IMPLANT;  RIGHT EYE PHACOEMULSIFICATION CLEAR CORNEA WITH STANDARD INTRAOCULAR LENS IMPLANT ;  Surgeon: Bello Briceno MD;  Location: [facility]     Social History     Tobacco Use     Smoking status: Never Smoker     Smokeless tobacco: Never Used   Substance Use Topics     Alcohol use: Yes     Prior to Admission medications    Medication Sig Start Date End Date Taking? Authorizing Provider   AmLODIPine Besylate (NORVASC PO) Take 5 mg by mouth daily   Yes Reported, Patient   METOPROLOL TARTRATE PO Take 50 mg by mouth 2 times daily   Yes Reported, Patient     Current Facility-Administered Medications Ordered in Epic   Medication Dose Route Frequency Last Rate Last Dose     diclofenac (VOLTAREN) 0.1 % ophthalmic solution 1 drop  1 drop Ophthalmic q5 Min Prior to Surgery   1 drop at 12/18/18 1052     lidocaine (AKTEN) ophthalmic gel 0.5 mL  0.5 mL Ophthalmic Once         moxifloxacin (VIGAMOX) 0.5 % ophthalmic solution 1 drop  1 drop Ophthalmic q5 Min Prior to Surgery   1 drop at 12/18/18 1052     phenylephrine (MYDFRIN /JOHANNE-SYNEPHRINE) 2.5  Temp remains at 102 6; dr Shine Gonsales aware; orders rec'd; ice packs placed on axilla/groin; will continue to monitor % ophthalmic solution 1 drop  1 drop Ophthalmic q5 Min Prior to Surgery   1 drop at 12/18/18 1052     povidone-iodine (BETADINE) 5 % ophthalmic solution 1 drop  1 drop Ophthalmic Once         proparacaine (ALCAINE) 0.5 % ophthalmic solution 1 drop  1 drop Ophthalmic Once         proparacaine (ALCAINE) 0.5 % ophthalmic solution 1 drop  1 drop Ophthalmic Once         tropicamide (MYDRIACYL) 1 % ophthalmic solution 1 drop  1 drop Ophthalmic q5 Min Prior to Surgery   1 drop at 12/18/18 1052     No current [facility]-ordered outpatient medications on file.       Wt Readings from Last 1 Encounters:   04/24/18 79.2 kg (174 lb 9.6 oz)     Temp Readings from Last 1 Encounters:   12/18/18 36.5  C (97.7  F)     BP Readings from Last 6 Encounters:   12/18/18 (!) 188/104   04/24/18 115/85     Pulse Readings from Last 4 Encounters:   04/24/18 63     Resp Readings from Last 1 Encounters:   12/18/18 16     SpO2 Readings from Last 1 Encounters:   04/24/18 100%     RECENT LABS:     ECG: Ventricular bigeminy; similar to preop ECG    ECHO:     Anesthesia Evaluation     . Pt has had prior anesthetic. Type: MAC and General    No history of anesthetic complications          ROS/MED HX    ENT/Pulmonary:      (-) tobacco use, asthma, COPD and sleep apnea   Neurologic:      (-) seizures and CVA   Cardiovascular: Comment: Ventricular Bigeminy   Crowley's     (+) hypertension----. : . . . :. .      (-) syncope, arrhythmias, irregular heartbeat/palpitations and valvular problems/murmurs   METS/Exercise Tolerance:     Hematologic:     (+) Other Hematologic Disorder-MGUS      Musculoskeletal:         GI/Hepatic:        (-) GERD and liver disease   Renal/Genitourinary:     (+) chronic renal disease, type: CRI,       Endo:      (-) Type II DM, thyroid disease and chronic steroid usage   Psychiatric:         Infectious Disease:         Malignancy:   (+) Malignancy History of Breast          Other:                     Physical Exam      Airway   Mallampati:  II  TM distance: >3 FB  Neck ROM: full    Dental   (+) caps    Cardiovascular   Rhythm and rate: irregular and normal  (-) no murmur    Pulmonary    breath sounds clear to auscultation(-) no wheezes                        Anesthesia Plan      History & Physical Review  History and physical reviewed and following examination; no interval change.    ASA Status:  3 .    NPO Status:  > 8 hours    Plan for MAC Reason for MAC:  Procedure to face, neck, head or breast  PONV prophylaxis:  Ondansetron (or other 5HT-3)  Light sedation       Postoperative Care  Postoperative pain management:  Multi-modal analgesia.      Consents  Anesthetic plan, risks, benefits and alternatives discussed with:  Patient..

## 2022-07-30 NOTE — PROGRESS NOTES
[facility] [facility]  Progress Note - Jayden Bullock 1945, 76 y o  female MRN: [id number]  [facility] Encounter: [id number]  Primary Care Provider: Juan Antonio Rivera MD   Date and time admitted to hospital: 7/25/2022 10:04 PM    * Septic shock [facility])  Assessment & Plan  Present on admission  Likely secondary to PICC line related bacteremia  PICC line has been removed  Currently off pressors  White blood cells has normalized  IV antibiotic per ID    Gram-positive bacteremia  Assessment & Plan  MSSE bacteremia  Likely secondary to PICC line, patient was on TPN  PICC line has been removed  2D echo without vegetation  On IV cefazolin per ID  Positive repeat blood culture on 07/26  Follow on repeat blood culture on 07/28  Now with recurrent fever, start IV fluid for hydration  ID is following    Severe protein-calorie malnutrition ([facility] [address] )  Assessment & Plan  Malnutrition Findings:   Adult Malnutrition type: Chronic illness  Adult Degree of Malnutrition: Other severe protein calorie malnutrition  Malnutrition Characteristics: Fat loss, Muscle loss, Inadequate energy                  360 Statement: Severe malnutrition in setting of chronic illness related to malabsorption and poor po intake as evidenced by overt loss of body fat and muscle mass observed (severe orbital wasting , severe trapezius wasting, severe quadricep wasting), and pt reported poor po intakes  Pt very  loquacious  BMI Findings: Body mass index is 19 91 kg/m²       Patient on chronic TPN post remote gastric bypass  Currently TPN on hold  Continue with oral diet  Nutrition recommending resuming TPN once medically appropriate to me daily needs    Sacral ulcer ([facility] [address] )  Assessment & Plan  Sacral decubitus ulcer without evidence of cellulitis  Continue with local care    H/O bariatric surgery - bypass  Assessment & Plan  · 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 at [facility]  · Outpatient bariatric follow-up      Anemia  Assessment & Plan  Anemia of chronic disease  Status post blood transfusion   Monitor    Depression  Assessment & Plan  Continue home med of trazodone and Klonopin    Hypothyroidism  Assessment & Plan  Continue levothyroxine      VTE Pharmacologic Prophylaxis:   High Risk (Score >/= 5) - Pharmacological DVT Prophylaxis Ordered: heparin  Sequential Compression Devices Ordered  Patient Centered Rounds: I performed bedside rounds with nursing staff today  Discussions with Specialists or Other Care Team Provider:     Education and Discussions with Family / Patient: Updated  (sister) via phone  Yesterday    Time Spent for Care: 45 minutes  More than 50% of total time spent on counseling and coordination of care as described above  Current Length of Stay: 4 day(s)  Current Patient Status: Inpatient   Certification Statement: The patient will continue to require additional inpatient hospital stay due to Management of bacteremia  Discharge Plan: Anticipate discharge in >72 hrs to rehab facility  Code Status: Level 1 - Full Code    Subjective:   Patient is sleeping in bed  Febrile this morning  No event overnight  Was able to talk to the nurse this morning     Objective:     Vitals:   Temp (24hrs), Av 1 °F (37 3 °C), Min:97 9 °F (36 6 °C), Max:102 1 °F (38 9 °C)    Temp:  [97 9 °F (36 6 °C)-102 1 °F (38 9 °C)] 102 1 °F (38 9 °C)  HR:  [70-98] 98  Resp:  [16-20] 16  BP: (117-135)/(54-62) 117/54  SpO2:  [91 %-96 %] 91 %  Body mass index is 19 91 kg/m²  Input and Output Summary (last 24 hours):      Intake/Output Summary (Last 24 hours) at 2022 1005  Last data filed at 2022 0600  Gross per 24 hour   Intake --   Output 500 ml   Net -500 ml       Physical Exam:   Physical Exam   Patient is lethargic, hard to open her eyes  to verbal stimuli  Comfortable in no acute distress  Cachectic chronically ill-appearing  Lung clear to auscultation bilateral anteriorly  Heart with mild tachycardia  Abdomen soft nontender  Lower extremities no edema    Additional Data:     Labs:  Results from last 7 days   Lab Units 07/29/22  0835   WBC Thousand/uL 10 06   HEMOGLOBIN g/dL 7 7*   HEMATOCRIT % 24 1*   PLATELETS Thousands/uL 328   NEUTROS PCT % 53   LYMPHS PCT % 27   MONOS PCT % 9   EOS PCT % 10*     Results from last 7 days   Lab Units 07/29/22  0835 07/26/22  1901 07/26/22  0408   SODIUM mmol/L 138   < > 134*   POTASSIUM mmol/L 3 6   < > 4 0   CHLORIDE mmol/L 110*   < > 106   CO2 mmol/L 23   < > 21   BUN mg/dL 15   < > 31*   CREATININE mg/dL 1 28   < > 1 25   ANION GAP mmol/L 5   < > 7   CALCIUM mg/dL 8 5   < > 8 0*   ALBUMIN g/dL  --   --  1 8*   TOTAL BILIRUBIN mg/dL  --   --  0 35   ALK PHOS U/L  --   --  83   ALT U/L  --   --  37   AST U/L  --   --  41   GLUCOSE RANDOM mg/dL 119   < > 107    < > = values in this interval not displayed  Results from last 7 days   Lab Units 07/25/22  2221   INR  1 20*             Results from last 7 days   Lab Units 07/25/22  2221   LACTIC ACID mmol/L 1 8   PROCALCITONIN ng/ml 0 60*       Lines/Drains:  Invasive Devices  Report    Peripheral Intravenous Line  Duration           Peripheral IV 07/27/22 Right Antecubital 2 days          Drain  Duration           External Urinary Catheter 2 days                      Imaging: No pertinent imaging reviewed  Recent Cultures (last 7 days):   Results from last 7 days   Lab Units 07/28/22  1502 07/26/22  1055 07/26/22  0506 07/25/22  2239 07/25/22  2221   BLOOD CULTURE  No Growth at 24 hrs    No Growth at 24 hrs   --  Staphylococcus epidermidis*  --  Staphylococcus epidermidis*  Staphylococcus epidermidis*   GRAM STAIN RESULT   --   --  Gram positive cocci in clusters*  --  Gram positive cocci in clusters*  Gram positive cocci in clusters*   LEGIONELLA URINARY ANTIGEN   --   --   --  Negative  --    C DIFF TOXIN B BY PCR   --  Negative  --   --   --        Last 24 Hours Medication List: Current Facility-Administered Medications   Medication Dose Route Frequency Provider Last Rate    acetaminophen  650 mg Oral Q6H PRN Sonny Sheriff DO      cefazolin  2,000 mg Intravenous Q8H Jasvir Moore MD 2,000 mg (07/30/22 0602)    clonazePAM  3 mg Oral HS Marioricky Thurston, DO      heparin (porcine)  5,000 Units Subcutaneous Q8H [address] Sonny Sheriff DO      levothyroxine  112 mcg Oral Early Morning Sonny Sheriff DO      loperamide  2 mg Oral TID PRN Jazmyne Gaffney DO      melatonin  6 mg Oral HS Mario Thurston, DO      midodrine  5 mg Oral TID AC Mario Thurston,       miSOPROStol  200 mcg Oral 4x Daily (with meals and at bedtime) Sonny Sheriff DO      ondansetron  4 mg Intravenous Q8H PRN Sonny Sheriff DO      pantoprazole  40 mg Oral BID AC Mario Thurston, DO      saccharomyces boulardii  250 mg Oral BID Jazmyne Gaffney DO      sodium chloride  100 mL/hr Intravenous Continuous Jazmyne Gaffney DO      sucralfate  1 g Oral 4x Daily (AC & HS) Sonny Sheriff DO      traZODone  150 mg Oral HS Sonny Sheriff DO          Today, Patient Was Seen By: Jazmyne Gaffney DO    **Please Note: This note may have been constructed using a voice recognition system  ** 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  Will continue sensipar to 60mg po daily, monitor calcium FU

## 2022-07-30 NOTE — CONSULTS
Vancomycin therapy has been discontinued  Pharmacy will sign off  Thank you for this consult  Please do not hesitate to call us with questions or re-consult us if the need arises       Teri Ash, PharmD, [address] [facility] and Internal Medicine Clinical Pharmacist  [phone] or via [place]

## 2022-07-30 NOTE — PLAN OF CARE
Problem: INFECTION - ADULT  Goal: Absence or prevention of progression during hospitalization  Description: INTERVENTIONS:  - Assess and monitor for signs and symptoms of infection  - Monitor lab/diagnostic results  - Monitor all insertion sites, i e  indwelling lines, tubes, and drains  - Monitor endotracheal if appropriate and nasal secretions for changes in amount and color  - [place] appropriate cooling/warming therapies per order  - Administer medications as ordered  - Instruct and encourage patient and family to use good hand hygiene technique  - Identify and instruct in appropriate isolation precautions for identified infection/condition  Outcome: Progressing     Problem: DISCHARGE PLANNING  Goal: Discharge to home or other facility with appropriate resources  Description: INTERVENTIONS:  - Identify barriers to discharge w/patient and caregiver  - Arrange for needed discharge resources and transportation as appropriate  - Identify discharge learning needs (meds, wound care, etc )  - Arrange for interpretive services to assist at discharge as needed  - Refer to Case Management Department for coordinating discharge planning if the patient needs post-hospital services based on physician/advanced practitioner order or complex needs related to functional status, cognitive ability, or social support system  Outcome: Progressing     Problem: Prexisting or High Potential for Compromised Skin Integrity  Goal: Skin integrity is maintained or improved  Description: INTERVENTIONS:  - Identify patients at risk for skin breakdown  - Assess and monitor skin integrity  - Assess and monitor nutrition and hydration status  - Monitor labs   - Assess for incontinence   - Turn and reposition patient  - Assist with mobility/ambulation  - Relieve pressure over bony prominences  - Avoid friction and shearing  - Provide appropriate hygiene as needed including keeping skin clean and dry  - Evaluate need for skin moisturizer/barrier cream  - Collaborate with interdisciplinary team   - Patient/family teaching  - Consider wound care consult   Outcome: Progressing     Problem: Nutrition/Hydration-ADULT  Goal: Nutrient/Hydration intake appropriate for improving, restoring or maintaining nutritional needs  Description: Monitor and assess patient's nutrition/hydration status for malnutrition  Collaborate with interdisciplinary team and initiate plan and interventions as ordered  Monitor patient's weight and dietary intake as ordered or per policy  Utilize nutrition screening tool and intervene as necessary  Determine patient's food preferences and provide high-protein, high-caloric foods as appropriate       INTERVENTIONS:  - Monitor oral intake, urinary output, labs, and treatment plans  - Assess nutrition and hydration status and recommend course of action  - Evaluate amount of meals eaten  - Assist patient with eating if necessary   - Allow adequate time for meals  - Recommend/ encourage appropriate diets, oral nutritional supplements, and vitamin/mineral supplements  - Order, calculate, and assess calorie counts as needed  - Recommend, monitor, and adjust tube feedings and TPN/PPN based on assessed needs  - Assess need for intravenous fluids  - Provide specific nutrition/hydration education as appropriate  - Include patient/family/caregiver in decisions related to nutrition  Outcome: Progressing     Problem: METABOLIC, FLUID AND ELECTROLYTES - ADULT  Goal: Electrolytes maintained within normal limits  Description: INTERVENTIONS:  - Monitor labs and assess patient for signs and symptoms of electrolyte imbalances  - Administer electrolyte replacement as ordered  - Monitor response to electrolyte replacements, including repeat lab results as appropriate  - Instruct patient on fluid and nutrition as appropriate  Outcome: Progressing

## 2022-07-30 NOTE — PROGRESS NOTES
Progress Note - Infectious Disease   Vandana Burgos 76 y o  female MRN: [id number]  Unit/Bed#: [facility] 828-01 Encounter: [id number]      Impression/Plan:  1  Septic shock, present on admission, likely secondary to PICC related bacteremia  [facility] has been removed   Patient is clinically much improved   She is now off pressors and out of ICU  The fever had resolved but now the patient spiking fever once again of unclear etiology  Antibiotic plan as in below  Recheck blood cultures x2 sets  Check chest x-ray  Recheck CBC with diff and CMP  Recheck procalcitonin level  Monitor hemodynamics  Additional workup as needed if fever recurs     2  MSSE bacteremia, most likely secondary to PICC line, with patient on TPN  [facility] has been removed   2D echo without vegetation   Repeat blood culture obtained on 07/26 still has growth but this was drawn prior to PICC removal   Repeat blood cultures on 07/28 have no growth thus far      Change antibiotic to IV cefazolin  Monitor temperature/WBC  Monitor hemodynamics  Follow-up repeat blood culture 7/26  Treat x7 days post PICC removal, through 8/2      3  NOAH, present on admission, most likely secondary to septic shock   Creatinine is much improved  Antibiotic at full dose  Monitor creatinine      4  Small sacral decubitus ulcer, without evidence of cellulitis  Local ulcer care  Monitor      5  Gastric ulcer, stable on PPI      6  Severe protein calorie malnutrition, on outpatient TPN   Given septic shock of PICC origin, risks and benefits of continued TPN should be reassessed      7  Status post distant gastric bypass  Discussed the above management plan with the primary service    Antibiotics:  Cefazolin 2  Post PICC line removal 4  Subjective:  Patient is now spiking fever; no nausea, vomiting, diarrhea; no cough, shortness of breath; no pain  No new symptoms    She has no localizing symptomatology    Objective:  Vitals:  Temp:  [97 9 °F (36 6 °C)-102 4 °F (39 1 °C)] 102 4 °F (39 1 °C)  HR:  [81-98] 98  Resp:  [16] 16  BP: (117-135)/(54-62) 117/54  SpO2:  [91 %-95 %] 91 %  Temp (24hrs), Av °F (37 8 °C), Min:97 9 °F (36 6 °C), Max:102 4 °F (39 1 °C)  Current: Temperature: (!) 102 4 °F (39 1 °C)    Physical Exam:   General Appearance:  Cachectic, debilitated, nontoxic, no acute distress  Throat: Oropharynx moist without lesions  Lungs:   Decreased breath sounds bilaterally; no wheezes, rhonchi or rales; respirations unlabored   Heart:  RRR; no murmur, rub or gallop   Abdomen:   Soft, non-tender, non-distended, positive bowel sounds  Extremities: No clubbing, cyanosis or edema   Skin: No new rashes or lesions  No draining wounds noted  Labs, Imaging, & Other studies:   All pertinent labs and imaging studies were personally reviewed  Results from last 7 days   Lab Units 22  0835 22  0550 22  0546   WBC Thousand/uL 10 06 9 11 13 27*   HEMOGLOBIN g/dL 7 7* 7 3* 7 7*   PLATELETS Thousands/uL 328 290 281     Results from last 7 days   Lab Units 22  0835 22  0550 22  0756 22  1901 22  0408 22  2221   SODIUM mmol/L 138 140  138 135   < > 134* 131*   POTASSIUM mmol/L 3 6 3 9  3 8 3 6   < > 4 0 4 4   CHLORIDE mmol/L 110* 112*  111* 108   < > 106 105   CO2 mmol/L 23 23  23 21   < > 21 20*   BUN mg/dL 15 18  18 21   < > 31* 30*   CREATININE mg/dL 1 28 0 97  0 89 0 99   < > 1 25 1 16   EGFR ml/min/1 73sq m 39 55  61 54   < > 41 44   CALCIUM mg/dL 8 5 8 6  8 5 8 3   < > 8 0* 8 4   AST U/L  --   --   --   --  41 35   ALT U/L  --   --   --   --  37 30   ALK PHOS U/L  --   --   --   --  83 100    < > = values in this interval not displayed  Results from last 7 days   Lab Units 22  1502 22  1055 22  0506 22  0352 229 22   BLOOD CULTURE  No Growth at 24 hrs    No Growth at 24 hrs   --  Staphylococcus epidermidis*  --   --  Staphylococcus epidermidis* Staphylococcus epidermidis*   GRAM STAIN RESULT   --   --  Gram positive cocci in clusters*  --   --  Gram positive cocci in clusters*  Gram positive cocci in clusters*   MRSA CULTURE ONLY   --   --   --  No Methicillin Resistant Staphlyococcus aureus (MRSA) isolated  --   --    LEGIONELLA URINARY ANTIGEN   --   --   --   --  Negative  --    C DIFF TOXIN B BY PCR   --  Negative  --   --   --   --      Results from last 7 days   Lab Units 07/25/22  2221   PROCALCITONIN ng/ml 0 60*

## 2022-07-30 NOTE — NURSING NOTE
Patient refused morning medications   Patient states "I want to take them later " I replied " I cannot leave the pills here for later, you can take them now or I have to throw them away " The patient states " throw them away "

## 2022-07-30 NOTE — ASSESSMENT & PLAN NOTE
MSSE bacteremia  Likely secondary to PICC line, patient was on TPN  PICC line has been removed  2D echo without vegetation  On IV cefazolin per ID  Positive repeat blood culture on 07/26  Follow on repeat blood culture on 07/28  Now with recurrent fever, start IV fluid for hydration  ID is following

## 2022-07-30 NOTE — PROGRESS NOTES
Pt ordered for blood cultures; pt refusing; states "you're not sticking me   I don't want them"; pt educated on importance of blood cultures; pt continues to refuse; dr Aubrey Qunitana aware

## 2022-07-30 NOTE — PLAN OF CARE
Problem: MOBILITY - ADULT  Goal: Maintain or return to baseline ADL function  Description: INTERVENTIONS:  -  Assess patient's ability to carry out ADLs; assess patient's baseline for ADL function and identify physical deficits which impact ability to perform ADLs (bathing, care of mouth/teeth, toileting, grooming, dressing, etc )  - Assess/evaluate cause of self-care deficits   - Assess range of motion  - Assess patient's mobility; develop plan if impaired  - Assess patient's need for assistive devices and provide as appropriate  - Encourage maximum independence but intervene and supervise when necessary  - Involve family in performance of ADLs  - Assess for home care needs following discharge   - Consider OT consult to assist with ADL evaluation and planning for discharge  - Provide patient education as appropriate  Outcome: Progressing  Goal: Maintains/Returns to pre admission functional level  Description: INTERVENTIONS:  - Perform BMAT or MOVE assessment daily    - Set and communicate daily mobility goal to care team and patient/family/caregiver     - Collaborate with rehabilitation services on mobility goals if consulted  - Out of bed for toileting  - Record patient progress and toleration of activity level   Outcome: Progressing     Problem: INFECTION - ADULT  Goal: Absence or prevention of progression during hospitalization  Description: INTERVENTIONS:  - Assess and monitor for signs and symptoms of infection  - Monitor lab/diagnostic results  - Monitor all insertion sites, i e  indwelling lines, tubes, and drains  - Monitor endotracheal if appropriate and nasal secretions for changes in amount and color  - [place] appropriate cooling/warming therapies per order  - Administer medications as ordered  - Instruct and encourage patient and family to use good hand hygiene technique  - Identify and instruct in appropriate isolation precautions for identified infection/condition  Outcome: Progressing  Goal: Absence of fever/infection during neutropenic period  Description: INTERVENTIONS:  - Monitor WBC    Outcome: Progressing     Problem: SAFETY ADULT  Goal: Maintain or return to baseline ADL function  Description: INTERVENTIONS:  -  Assess patient's ability to carry out ADLs; assess patient's baseline for ADL function and identify physical deficits which impact ability to perform ADLs (bathing, care of mouth/teeth, toileting, grooming, dressing, etc )  - Assess/evaluate cause of self-care deficits   - Assess range of motion  - Assess patient's mobility; develop plan if impaired  - Assess patient's need for assistive devices and provide as appropriate  - Encourage maximum independence but intervene and supervise when necessary  - Involve family in performance of ADLs  - Assess for home care needs following discharge   - Consider OT consult to assist with ADL evaluation and planning for discharge  - Provide patient education as appropriate  Outcome: Progressing  Goal: Maintains/Returns to pre admission functional level  Description: INTERVENTIONS:  - Perform BMAT or MOVE assessment daily    - Set and communicate daily mobility goal to care team and patient/family/caregiver     - Out of bed for toileting  - Record patient progress and toleration of activity level   Outcome: Progressing  Goal: Patient will remain free of falls  Description: INTERVENTIONS:  - Educate patient/family on patient safety including physical limitations  - Instruct patient to call for assistance with activity   - Consult OT/PT to assist with strengthening/mobility   - Keep Call bell within reach  - Keep bed low and locked with side rails adjusted as appropriate  - Keep care items and personal belongings within reach  - Initiate and maintain comfort rounds  - Make Fall Risk Sign visible to staff  - Apply yellow socks and bracelet for high fall risk patients  - Consider moving patient to room near nurses station  Outcome: Progressing     Problem: Prexisting or High Potential for Compromised Skin Integrity  Goal: Skin integrity is maintained or improved  Description: INTERVENTIONS:  - Identify patients at risk for skin breakdown  - Assess and monitor skin integrity  - Assess and monitor nutrition and hydration status  - Monitor labs   - Assess for incontinence   - Turn and reposition patient  - Assist with mobility/ambulation  - Relieve pressure over bony prominences  - Avoid friction and shearing  - Provide appropriate hygiene as needed including keeping skin clean and dry  - Evaluate need for skin moisturizer/barrier cream  - Collaborate with interdisciplinary team   - Patient/family teaching  - Consider wound care consult   Outcome: Progressing     Problem: Nutrition/Hydration-ADULT  Goal: Nutrient/Hydration intake appropriate for improving, restoring or maintaining nutritional needs  Description: Monitor and assess patient's nutrition/hydration status for malnutrition  Collaborate with interdisciplinary team and initiate plan and interventions as ordered  Monitor patient's weight and dietary intake as ordered or per policy  Utilize nutrition screening tool and intervene as necessary  Determine patient's food preferences and provide high-protein, high-caloric foods as appropriate       INTERVENTIONS:  - Monitor oral intake, urinary output, labs, and treatment plans  - Assess nutrition and hydration status and recommend course of action  - Evaluate amount of meals eaten  - Assist patient with eating if necessary   - Allow adequate time for meals  - Recommend/ encourage appropriate diets, oral nutritional supplements, and vitamin/mineral supplements  - Order, calculate, and assess calorie counts as needed  - Recommend, monitor, and adjust tube feedings and TPN/PPN based on assessed needs  - Assess need for intravenous fluids  - Provide specific nutrition/hydration education as appropriate  - Include patient/family/caregiver in decisions related to nutrition  Outcome: Progressing     Problem: Potential for Falls  Goal: Patient will remain free of falls  Description: INTERVENTIONS:  - Educate patient/family on patient safety including physical limitations  - Instruct patient to call for assistance with activity   - Consult OT/PT to assist with strengthening/mobility   - Keep Call bell within reach  - Keep bed low and locked with side rails adjusted as appropriate  - Keep care items and personal belongings within reach  - Initiate and maintain comfort rounds  - Make Fall Risk Sign visible to staff  - Apply yellow socks and bracelet for high fall risk patients  - Consider moving patient to room near nurses station  Outcome: Progressing

## 2022-07-31 PROBLEM — R19.7 DIARRHEA: Status: ACTIVE | Noted: 2022-07-31

## 2022-07-31 LAB
ABO GROUP BLD: NORMAL
ALBUMIN SERPL BCP-MCNC: 1.8 G/DL (ref 3.5–5)
ALP SERPL-CCNC: 117 U/L (ref 46–116)
ALT SERPL W P-5'-P-CCNC: 17 U/L (ref 12–78)
ANION GAP SERPL CALCULATED.3IONS-SCNC: 9 MMOL/L (ref 4–13)
AST SERPL W P-5'-P-CCNC: 47 U/L (ref 5–45)
BASOPHILS # BLD AUTO: 0.05 THOUSANDS/ΜL (ref 0–0.1)
BASOPHILS NFR BLD AUTO: 0 % (ref 0–1)
BILIRUB SERPL-MCNC: 0.37 MG/DL (ref 0.2–1)
BLD GP AB SCN SERPL QL: NEGATIVE
BUN SERPL-MCNC: 15 MG/DL (ref 5–25)
CALCIUM ALBUM COR SERPL-MCNC: 9.8 MG/DL (ref 8.3–10.1)
CALCIUM SERPL-MCNC: 8 MG/DL (ref 8.3–10.1)
CHLORIDE SERPL-SCNC: 107 MMOL/L (ref 96–108)
CO2 SERPL-SCNC: 20 MMOL/L (ref 21–32)
CREAT SERPL-MCNC: 1.35 MG/DL (ref 0.6–1.3)
EOSINOPHIL # BLD AUTO: 0.82 THOUSAND/ΜL (ref 0–0.61)
EOSINOPHIL NFR BLD AUTO: 3 % (ref 0–6)
ERYTHROCYTE [DISTWIDTH] IN BLOOD BY AUTOMATED COUNT: 21.8 % (ref 11.6–15.1)
GFR SERPL CREATININE-BSD FRML MDRD: 37 ML/MIN/1.73SQ M
GLUCOSE SERPL-MCNC: 62 MG/DL (ref 65–140)
GLUCOSE SERPL-MCNC: 94 MG/DL (ref 65–140)
HCT VFR BLD AUTO: 22.3 % (ref 34.8–46.1)
HGB BLD-MCNC: 6.8 G/DL (ref 11.5–15.4)
IMM GRANULOCYTES # BLD AUTO: 0.43 THOUSAND/UL (ref 0–0.2)
IMM GRANULOCYTES NFR BLD AUTO: 2 % (ref 0–2)
LYMPHOCYTES # BLD AUTO: 2.08 THOUSANDS/ΜL (ref 0.6–4.47)
LYMPHOCYTES NFR BLD AUTO: 8 % (ref 14–44)
MCH RBC QN AUTO: 30.1 PG (ref 26.8–34.3)
MCHC RBC AUTO-ENTMCNC: 30.5 G/DL (ref 31.4–37.4)
MCV RBC AUTO: 99 FL (ref 82–98)
MONOCYTES # BLD AUTO: 1.08 THOUSAND/ΜL (ref 0.17–1.22)
MONOCYTES NFR BLD AUTO: 4 % (ref 4–12)
NEUTROPHILS # BLD AUTO: 20.76 THOUSANDS/ΜL (ref 1.85–7.62)
NEUTS SEG NFR BLD AUTO: 83 % (ref 43–75)
NRBC BLD AUTO-RTO: 0 /100 WBCS
PLATELET # BLD AUTO: 389 THOUSANDS/UL (ref 149–390)
PMV BLD AUTO: 10 FL (ref 8.9–12.7)
POTASSIUM SERPL-SCNC: 3.3 MMOL/L (ref 3.5–5.3)
PROCALCITONIN SERPL-MCNC: 2.93 NG/ML
PROT SERPL-MCNC: 6.3 G/DL (ref 6.4–8.4)
RBC # BLD AUTO: 2.26 MILLION/UL (ref 3.81–5.12)
RH BLD: POSITIVE
SODIUM SERPL-SCNC: 136 MMOL/L (ref 135–147)
SPECIMEN EXPIRATION DATE: NORMAL
WBC # BLD AUTO: 25.22 THOUSAND/UL (ref 4.31–10.16)

## 2022-07-31 PROCEDURE — 85025 COMPLETE CBC W/AUTO DIFF WBC: CPT | Performed by: INTERNAL MEDICINE

## 2022-07-31 PROCEDURE — 86900 BLOOD TYPING SEROLOGIC ABO: CPT | Performed by: INTERNAL MEDICINE

## 2022-07-31 PROCEDURE — 97530 THERAPEUTIC ACTIVITIES: CPT

## 2022-07-31 PROCEDURE — 86850 RBC ANTIBODY SCREEN: CPT | Performed by: INTERNAL MEDICINE

## 2022-07-31 PROCEDURE — 97110 THERAPEUTIC EXERCISES: CPT

## 2022-07-31 PROCEDURE — P9016 RBC LEUKOCYTES REDUCED: HCPCS

## 2022-07-31 PROCEDURE — 99232 SBSQ HOSP IP/OBS MODERATE 35: CPT | Performed by: INTERNAL MEDICINE

## 2022-07-31 PROCEDURE — 99233 SBSQ HOSP IP/OBS HIGH 50: CPT | Performed by: INTERNAL MEDICINE

## 2022-07-31 PROCEDURE — 86923 COMPATIBILITY TEST ELECTRIC: CPT

## 2022-07-31 PROCEDURE — 80053 COMPREHEN METABOLIC PANEL: CPT | Performed by: INTERNAL MEDICINE

## 2022-07-31 PROCEDURE — 87493 C DIFF AMPLIFIED PROBE: CPT | Performed by: INTERNAL MEDICINE

## 2022-07-31 PROCEDURE — 86901 BLOOD TYPING SEROLOGIC RH(D): CPT | Performed by: INTERNAL MEDICINE

## 2022-07-31 PROCEDURE — 82948 REAGENT STRIP/BLOOD GLUCOSE: CPT

## 2022-07-31 PROCEDURE — 87040 BLOOD CULTURE FOR BACTERIA: CPT | Performed by: INTERNAL MEDICINE

## 2022-07-31 PROCEDURE — 84145 PROCALCITONIN (PCT): CPT | Performed by: INTERNAL MEDICINE

## 2022-07-31 RX ORDER — POTASSIUM CHLORIDE 20 MEQ/1
20 TABLET, EXTENDED RELEASE ORAL ONCE
Status: COMPLETED | OUTPATIENT
Start: 2022-07-31 | End: 2022-07-31

## 2022-07-31 RX ORDER — CALCIUM CARBONATE 200(500)MG
500 TABLET,CHEWABLE ORAL DAILY PRN
Status: DISCONTINUED | OUTPATIENT
Start: 2022-07-31 | End: 2022-08-09 | Stop reason: HOSPADM

## 2022-07-31 RX ADMIN — CLONAZEPAM 3 MG: 1 TABLET ORAL at 21:39

## 2022-07-31 RX ADMIN — MISOPROSTOL 200 MCG: 200 TABLET ORAL at 09:05

## 2022-07-31 RX ADMIN — POTASSIUM CHLORIDE 20 MEQ: 1500 TABLET, EXTENDED RELEASE ORAL at 11:05

## 2022-07-31 RX ADMIN — PIPERACILLIN AND TAZOBACTAM 3.38 G: 36; 4.5 INJECTION, POWDER, FOR SOLUTION INTRAVENOUS at 17:14

## 2022-07-31 RX ADMIN — MISOPROSTOL 200 MCG: 200 TABLET ORAL at 15:18

## 2022-07-31 RX ADMIN — ACETAMINOPHEN 650 MG: 325 TABLET ORAL at 15:18

## 2022-07-31 RX ADMIN — LOPERAMIDE HYDROCHLORIDE 2 MG: 2 CAPSULE ORAL at 21:43

## 2022-07-31 RX ADMIN — SUCRALFATE 1 G: 1 TABLET ORAL at 15:18

## 2022-07-31 RX ADMIN — SODIUM CHLORIDE 100 ML/HR: 0.9 INJECTION, SOLUTION INTRAVENOUS at 09:06

## 2022-07-31 RX ADMIN — LEVOTHYROXINE SODIUM 112 MCG: 112 TABLET ORAL at 06:00

## 2022-07-31 RX ADMIN — PIPERACILLIN AND TAZOBACTAM 3.38 G: 36; 4.5 INJECTION, POWDER, FOR SOLUTION INTRAVENOUS at 12:35

## 2022-07-31 RX ADMIN — CEFAZOLIN SODIUM 2000 MG: 2 SOLUTION INTRAVENOUS at 05:59

## 2022-07-31 RX ADMIN — LOPERAMIDE HYDROCHLORIDE 2 MG: 2 CAPSULE ORAL at 15:18

## 2022-07-31 RX ADMIN — SUCRALFATE 1 G: 1 TABLET ORAL at 21:39

## 2022-07-31 RX ADMIN — HEPARIN SODIUM 5000 UNITS: 5000 INJECTION INTRAVENOUS; SUBCUTANEOUS at 12:36

## 2022-07-31 RX ADMIN — HEPARIN SODIUM 5000 UNITS: 5000 INJECTION INTRAVENOUS; SUBCUTANEOUS at 06:00

## 2022-07-31 RX ADMIN — PANTOPRAZOLE SODIUM 40 MG: 40 TABLET, DELAYED RELEASE ORAL at 05:59

## 2022-07-31 RX ADMIN — Medication 250 MG: at 17:14

## 2022-07-31 RX ADMIN — CALCIUM CARBONATE (ANTACID) CHEW TAB 500 MG 500 MG: 500 CHEW TAB at 21:38

## 2022-07-31 RX ADMIN — SUCRALFATE 1 G: 1 TABLET ORAL at 11:05

## 2022-07-31 RX ADMIN — MIDODRINE HYDROCHLORIDE 5 MG: 5 TABLET ORAL at 15:18

## 2022-07-31 RX ADMIN — MIDODRINE HYDROCHLORIDE 5 MG: 5 TABLET ORAL at 11:05

## 2022-07-31 RX ADMIN — MISOPROSTOL 200 MCG: 200 TABLET ORAL at 11:06

## 2022-07-31 RX ADMIN — PANTOPRAZOLE SODIUM 40 MG: 40 TABLET, DELAYED RELEASE ORAL at 15:18

## 2022-07-31 RX ADMIN — TRAZODONE HYDROCHLORIDE 150 MG: 100 TABLET ORAL at 21:39

## 2022-07-31 RX ADMIN — HEPARIN SODIUM 5000 UNITS: 5000 INJECTION INTRAVENOUS; SUBCUTANEOUS at 21:39

## 2022-07-31 RX ADMIN — ONDANSETRON 4 MG: 2 INJECTION INTRAMUSCULAR; INTRAVENOUS at 17:14

## 2022-07-31 RX ADMIN — Medication 250 MG: at 09:05

## 2022-07-31 RX ADMIN — MISOPROSTOL 200 MCG: 200 TABLET ORAL at 21:40

## 2022-07-31 RX ADMIN — PIPERACILLIN AND TAZOBACTAM 3.38 G: 36; 4.5 INJECTION, POWDER, FOR SOLUTION INTRAVENOUS at 23:40

## 2022-07-31 NOTE — PLAN OF CARE
Problem: INFECTION - ADULT  Goal: Absence or prevention of progression during hospitalization  Description: INTERVENTIONS:  - Assess and monitor for signs and symptoms of infection  - Monitor lab/diagnostic results  - Monitor all insertion sites, i e  indwelling lines, tubes, and drains  - Monitor endotracheal if appropriate and nasal secretions for changes in amount and color  - [place] appropriate cooling/warming therapies per order  - Administer medications as ordered  - Instruct and encourage patient and family to use good hand hygiene technique  - Identify and instruct in appropriate isolation precautions for identified infection/condition  Outcome: Progressing     Problem: DISCHARGE PLANNING  Goal: Discharge to home or other facility with appropriate resources  Description: INTERVENTIONS:  - Identify barriers to discharge w/patient and caregiver  - Arrange for needed discharge resources and transportation as appropriate  - Identify discharge learning needs (meds, wound care, etc )  - Arrange for interpretive services to assist at discharge as needed  - Refer to Case Management Department for coordinating discharge planning if the patient needs post-hospital services based on physician/advanced practitioner order or complex needs related to functional status, cognitive ability, or social support system  Outcome: Progressing     Problem: Prexisting or High Potential for Compromised Skin Integrity  Goal: Skin integrity is maintained or improved  Description: INTERVENTIONS:  - Identify patients at risk for skin breakdown  - Assess and monitor skin integrity  - Assess and monitor nutrition and hydration status  - Monitor labs   - Assess for incontinence   - Turn and reposition patient  - Assist with mobility/ambulation  - Relieve pressure over bony prominences  - Avoid friction and shearing  - Provide appropriate hygiene as needed including keeping skin clean and dry  - Evaluate need for skin moisturizer/barrier cream  - Collaborate with interdisciplinary team   - Patient/family teaching  - Consider wound care consult   Outcome: Progressing

## 2022-07-31 NOTE — ASSESSMENT & PLAN NOTE
Ongoing diarrhea for the past several months  Malabsorption  Now with worsening leukocytosis  Negative C diff toxin on 07/26  Repeat C diff toxin

## 2022-07-31 NOTE — ASSESSMENT & PLAN NOTE
MSSE bacteremia  Likely secondary to PICC line, patient was on TPN  PICC line has been removed  2D echo without vegetation  On IV cefazolin per ID  Positive repeat blood culture on 07/26  Follow on repeat blood culture on 07/28 and 7/30  Now with recurrent fever  ID is following

## 2022-07-31 NOTE — ASSESSMENT & PLAN NOTE
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

## 2022-07-31 NOTE — PROGRESS NOTES
Progress Note - Infectious Disease   Rip Kobe 76 y o  female MRN: [id number]  Unit/Bed#: [facility] 828-01 Encounter: [id number]      Impression/Plan:  1  Septic shock, present on admission, likely secondary to PICC related bacteremia  [facility] has been removed   Patient is clinically much improved   She is now off pressors and out of ICU  The fever had resolved but now the patient spiking fever once again of unclear etiology  Refuse blood cultures yesterday but now agreeing to them  Unclear significance of the patchy findings on chest x-ray  White count is increased  Consideration for the possibility of C difficile colitis  The procalcitonin level has also increased  LFTs have also increased of unclear significance  Her blood pressure has now become a bit more soft  Broaden antibiotics to Zosyn 3 375 g IV q 6 hours  Recheck blood cultures x2 sets  Check stool for C diff  Check right upper quadrant ultrasound  Recheck CBC with diff and CMP  Recheck procalcitonin level  Monitor hemodynamics  Additional workup as needed if fever recurs     2  MSSE bacteremia, most likely secondary to PICC line, with patient on TPN  [facility] has been removed   2D echo without vegetation   Repeat blood culture obtained on 07/26 still has growth but this was drawn prior to PICC removal   Repeat blood cultures on 07/28 have no growth thus far      Antibiotics as above  Monitor temperature/WBC  Monitor hemodynamics  Recheck blood cultures x2 sets  Treat x7 days post PICC removal, through 8/2      3  NOAH, present on admission, most likely secondary to septic shock   Creatinine is much improved but now with a bump in the creatinine  Antibiotic at full dose  Recheck BMP     4  Small sacral decubitus ulcer, without evidence of cellulitis  Local ulcer care  Monitor      5  Gastric ulcer, stable on PPI      6  Severe protein calorie malnutrition, on outpatient TPN    Given septic shock of PICC origin, risks and benefits of continued TPN should be reassessed      7  Status post distant gastric bypass  Discussed the above management plan with the primary service    Antibiotics:  Cefazolin 3  Post PICC removal 5    Subjective:  Patient with decreased temperature curve today; no nausea, vomiting, but still having diarrhea; no cough, shortness of breath requiring low level oxygen support; no pain  No new symptoms  Refused blood cultures yesterday but was lap them this morning    Objective:  Vitals:  Temp:  [97 8 °F (36 6 °C)-102 6 °F (39 2 °C)] 99 2 °F (37 3 °C)  HR:  [] 91  Resp:  [15-18] 16  BP: ()/(40-54) 108/54  SpO2:  [94 %-99 %] 94 %  Temp (24hrs), Av 8 °F (37 7 °C), Min:97 8 °F (36 6 °C), Max:102 6 °F (39 2 °C)  Current: Temperature: 99 2 °F (37 3 °C)    Physical Exam:   General Appearance:  Alert, interactive, nontoxic, no acute distress  Throat: Oropharynx moist without lesions  Lungs:   Decreased breath sounds bilaterally; no wheezes, rhonchi or rales; respirations unlabored   Heart:  RRR; no murmur, rub or gallop   Abdomen:   Soft, non-tender, non-distended, positive bowel sounds  Extremities: No clubbing, cyanosis or edema   Skin: No new rashes or lesions  No draining wounds noted         Labs, Imaging, & Other studies:   All pertinent labs and imaging studies were personally reviewed  Results from last 7 days   Lab Units 22  0448 22  0835 22  0550   WBC Thousand/uL 25 22* 10 06 9 11   HEMOGLOBIN g/dL 6 8* 7 7* 7 3*   PLATELETS Thousands/uL 389 328 290     Results from last 7 days   Lab Units 22  0448 22  0835 22  0550 22  1901 22  0408 22  2221   SODIUM mmol/L 136 138 140  138   < > 134* 131*   POTASSIUM mmol/L 3 3* 3 6 3 9  3 8   < > 4 0 4 4   CHLORIDE mmol/L 107 110* 112*  111*   < > 106 105   CO2 mmol/L 20* 23 23  23   < > 21 20*   BUN mg/dL 15 15 18  18   < > 31* 30*   CREATININE mg/dL 1 35* 1 28 0 97  0 89   < > 1 25 1 16   EGFR ml/min/1 73sq m 37 39 55  61   < > 41 44   CALCIUM mg/dL 8 0* 8 5 8 6  8 5   < > 8 0* 8 4   AST U/L 47*  --   --   --  41 35   ALT U/L 17  --   --   --  37 30   ALK PHOS U/L 117*  --   --   --  83 100    < > = values in this interval not displayed  Results from last 7 days   Lab Units 07/28/22  1502 07/26/22  1055 07/26/22  0506 07/26/22  0352 07/25/22  2239 07/25/22  2221   BLOOD CULTURE  No Growth at 48 hrs  No Growth at 48 hrs    --  Staphylococcus epidermidis*  --   --  Staphylococcus epidermidis*  Staphylococcus epidermidis*   GRAM STAIN RESULT   --   --  Gram positive cocci in clusters*  --   --  Gram positive cocci in clusters*  Gram positive cocci in clusters*   MRSA CULTURE ONLY   --   --   --  No Methicillin Resistant Staphlyococcus aureus (MRSA) isolated  --   --    LEGIONELLA URINARY ANTIGEN   --   --   --   --  Negative  --    C DIFF TOXIN B BY PCR   --  Negative  --   --   --   --      Results from last 7 days   Lab Units 07/31/22  0448 07/25/22  2221   PROCALCITONIN ng/ml 2 93* 0 60*        chest x-ray-patchy consolidation    Images personally reviewed by me in PACS

## 2022-07-31 NOTE — ASSESSMENT & PLAN NOTE
Present on admission  Likely secondary to PICC line related bacteremia  PICC line has been removed  Currently off pressors  Now with recurrent leukocytosis, hypotension and fever  Follow on chest x-ray, Check C diff toxin  IV antibiotic per ID

## 2022-07-31 NOTE — ASSESSMENT & PLAN NOTE
Anemia of chronic disease  Status post blood transfusion 7/26  Hemoglobin drop today  Transfuse 1 unit of blood  Monitor

## 2022-07-31 NOTE — PROGRESS NOTES
[address] [facility]  Progress Note - Yari Marie 1945, 76 y o  female MRN: [id number]  [facility] Encounter: [id number]  Primary Care Provider: Kylee Rodriguez MD   Date and time admitted to hospital: 7/25/2022 10:04 PM    * Septic shock [facility])  Assessment & Plan  Present on admission  Likely secondary to PICC line related bacteremia  PICC line has been removed  Currently off pressors  Now with recurrent leukocytosis, hypotension and fever  Follow on chest x-ray, Check C diff toxin  IV antibiotic per ID    Gram-positive bacteremia  Assessment & Plan  MSSE bacteremia  Likely secondary to PICC line, patient was on TPN  PICC line has been removed  2D echo without vegetation  On IV cefazolin per ID  Positive repeat blood culture on 07/26  Follow on repeat blood culture on 07/28 and 7/30  Now with recurrent fever  ID is following    Severe protein-calorie malnutrition (HCC)  Assessment & Plan  Malnutrition Findings:   Adult Malnutrition type: Chronic illness  Adult Degree of Malnutrition: Other severe protein calorie malnutrition  Malnutrition Characteristics: Fat loss, Muscle loss, Inadequate energy                  360 Statement: Severe malnutrition in setting of chronic illness related to malabsorption and poor po intake as evidenced by overt loss of body fat and muscle mass observed (severe orbital wasting , severe trapezius wasting, severe quadricep wasting), and pt reported poor po intakes  Pt very  loquacious  BMI Findings: Body mass index is 19 91 kg/m²       Patient on chronic TPN post remote gastric bypass  Currently TPN on hold  Continue with oral diet  Nutrition recommending resuming TPN once medically appropriate to me daily needs    Diarrhea  Assessment & Plan  Ongoing diarrhea for the past several months  Malabsorption  Now with worsening leukocytosis  Negative C diff toxin on 07/26  Repeat C diff toxin    Sacral ulcer ([facility] [address] )  Assessment & Plan  Sacral decubitus ulcer without evidence of cellulitis  Continue with local care    H/O bariatric surgery - bypass  Assessment & Plan  · 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 at [facility]  · Outpatient bariatric follow-up      Anemia  Assessment & Plan  Anemia of chronic disease  Status post blood transfusion   Hemoglobin drop today  Transfuse 1 unit of blood  Monitor    Depression  Assessment & Plan  Continue home med of trazodone and Klonopin    Hypothyroidism  Assessment & Plan  Continue levothyroxine      VTE Pharmacologic Prophylaxis:   High Risk (Score >/= 5) - Pharmacological DVT Prophylaxis Ordered: heparin  Sequential Compression Devices Ordered  Patient Centered Rounds: I performed bedside rounds with nursing staff today  Discussions with Specialists or Other Care Team Provider:     Education and Discussions with Family / Patient: Updated  (sister) via phone  Time Spent for Care: 45 minutes  More than 50% of total time spent on counseling and coordination of care as described above  Current Length of Stay: 5 day(s)  Current Patient Status: Inpatient   Certification Statement: The patient will continue to require additional inpatient hospital stay due to Management of sepsis  Discharge Plan: Anticipate discharge in >72 hrs to rehab facility  Code Status: Level 1 - Full Code    Subjective:   Patient seen and examined  Continue with ongoing diarrhea  Mild abdominal discomfort  Poor appetite and oral intake    Objective:     Vitals:   Temp (24hrs), Av °F (37 8 °C), Min:97 8 °F (36 6 °C), Max:102 6 °F (39 2 °C)    Temp:  [97 8 °F (36 6 °C)-102 6 °F (39 2 °C)] 99 2 °F (37 3 °C)  HR:  [] 91  Resp:  [15-18] 16  BP: ()/(40-54) 108/54  SpO2:  [94 %-99 %] 94 %  Body mass index is 19 91 kg/m²  Input and Output Summary (last 24 hours):      Intake/Output Summary (Last 24 hours) at 2022 0930  Last data filed at 2022 1732  Gross per 24 hour   Intake 763 33 ml   Output --   Net 763 33 ml       Physical Exam:   Physical Exam   Patient is awake in no acute distress  Cachectic chronically ill-appearing  Dry mucous membrane  Lung clear to auscultation bilateral anteriorly  Heart with mild tachycardia  Abdomen soft nontender  Lower extremities no edema    Additional Data:     Labs:  Results from last 7 days   Lab Units 07/31/22  0448   WBC Thousand/uL 25 22*   HEMOGLOBIN g/dL 6 8*   HEMATOCRIT % 22 3*   PLATELETS Thousands/uL 389   NEUTROS PCT % 83*   LYMPHS PCT % 8*   MONOS PCT % 4   EOS PCT % 3     Results from last 7 days   Lab Units 07/31/22  0448   SODIUM mmol/L 136   POTASSIUM mmol/L 3 3*   CHLORIDE mmol/L 107   CO2 mmol/L 20*   BUN mg/dL 15   CREATININE mg/dL 1 35*   ANION GAP mmol/L 9   CALCIUM mg/dL 8 0*   ALBUMIN g/dL 1 8*   TOTAL BILIRUBIN mg/dL 0 37   ALK PHOS U/L 117*   ALT U/L 17   AST U/L 47*   GLUCOSE RANDOM mg/dL 62*     Results from last 7 days   Lab Units 07/25/22  2221   INR  1 20*             Results from last 7 days   Lab Units 07/31/22  0448 07/25/22  2221   LACTIC ACID mmol/L  --  1 8   PROCALCITONIN ng/ml 2 93* 0 60*       Lines/Drains:  Invasive Devices  Report    Peripheral Intravenous Line  Duration           Peripheral IV 07/27/22 Right Antecubital 3 days          Drain  Duration           External Urinary Catheter 3 days                      Imaging: No pertinent imaging reviewed  Recent Cultures (last 7 days):   Results from last 7 days   Lab Units 07/28/22  1502 07/26/22  1055 07/26/22  0506 07/25/22  2239 07/25/22  2221   BLOOD CULTURE  No Growth at 48 hrs  No Growth at 48 hrs    --  Staphylococcus epidermidis*  --  Staphylococcus epidermidis*  Staphylococcus epidermidis*   GRAM STAIN RESULT   --   --  Gram positive cocci in clusters*  --  Gram positive cocci in clusters*  Gram positive cocci in clusters*   LEGIONELLA URINARY ANTIGEN   --   --   --  Negative  --    C DIFF TOXIN B BY PCR   --  Negative  --   --   -- Last 24 Hours Medication List:   Current Facility-Administered Medications   Medication Dose Route Frequency Provider Last Rate    acetaminophen  650 mg Oral Q4H PRN Willis Cartagena DO      cefazolin  2,000 mg Intravenous Q8H Sadi Lopez MD 2,000 mg (07/31/22 0559)    clonazePAM  3 mg Oral HS Mario Bertrand, DO      heparin (porcine)  5,000 Units Subcutaneous Q8H [facility] Manuel Middleton DO      levothyroxine  112 mcg Oral Early Morning Manuel Middleton DO      loperamide  2 mg Oral TID PRN Willis Cartagena DO      melatonin  6 mg Oral HS Mario Bertrand,       midodrine  5 mg Oral TID AC Mario Bertrand, DO      miSOPROStol  200 mcg Oral 4x Daily (with meals and at bedtime) Manuel Middleton DO      ondansetron  4 mg Intravenous Q8H PRN Manuel Middleton DO      pantoprazole  40 mg Oral BID AC Mario Thurston,       potassium chloride  20 mEq Oral Once Willis Cartagena DO      saccharomyces boulardii  250 mg Oral BID Willis Cartagena DO      sodium chloride  100 mL/hr Intravenous Continuous Willis Cartagena  mL/hr (07/31/22 0906)    sucralfate  1 g Oral 4x Daily (AC & HS) Manuel Middleton DO      traZODone  150 mg Oral HS Manuel Middleton DO          Today, Patient Was Seen By: Willis Cartagena DO    **Please Note: This note may have been constructed using a voice recognition system  **

## 2022-07-31 NOTE — RESTORATIVE TECHNICIAN NOTE
Restorative Technician Note      Patient Name: Carey Tobar     Restorative Tech Visit Date: 7/31/2022  Note Type: Mobility (Held per RN request; pt to recieve blood)    Camila Mcdaniel  DPT, Restorative Technician

## 2022-07-31 NOTE — PLAN OF CARE
Problem: INFECTION - ADULT  Goal: Absence or prevention of progression during hospitalization  Description: INTERVENTIONS:  - Assess and monitor for signs and symptoms of infection  - Monitor lab/diagnostic results  - Monitor all insertion sites, i e  indwelling lines, tubes, and drains  - Monitor endotracheal if appropriate and nasal secretions for changes in amount and color  - [place] appropriate cooling/warming therapies per order  - Administer medications as ordered  - Instruct and encourage patient and family to use good hand hygiene technique  - Identify and instruct in appropriate isolation precautions for identified infection/condition  Outcome: Progressing     Problem: DISCHARGE PLANNING  Goal: Discharge to home or other facility with appropriate resources  Description: INTERVENTIONS:  - Identify barriers to discharge w/patient and caregiver  - Arrange for needed discharge resources and transportation as appropriate  - Identify discharge learning needs (meds, wound care, etc )  - Arrange for interpretive services to assist at discharge as needed  - Refer to Case Management Department for coordinating discharge planning if the patient needs post-hospital services based on physician/advanced practitioner order or complex needs related to functional status, cognitive ability, or social support system  Outcome: Progressing     Problem: Prexisting or High Potential for Compromised Skin Integrity  Goal: Skin integrity is maintained or improved  Description: INTERVENTIONS:  - Identify patients at risk for skin breakdown  - Assess and monitor skin integrity  - Assess and monitor nutrition and hydration status  - Monitor labs   - Assess for incontinence   - Turn and reposition patient  - Assist with mobility/ambulation  - Relieve pressure over bony prominences  - Avoid friction and shearing  - Provide appropriate hygiene as needed including keeping skin clean and dry  - Evaluate need for skin moisturizer/barrier cream  - Collaborate with interdisciplinary team   - Patient/family teaching  - Consider wound care consult   Outcome: Progressing     Problem: Nutrition/Hydration-ADULT  Goal: Nutrient/Hydration intake appropriate for improving, restoring or maintaining nutritional needs  Description: Monitor and assess patient's nutrition/hydration status for malnutrition  Collaborate with interdisciplinary team and initiate plan and interventions as ordered  Monitor patient's weight and dietary intake as ordered or per policy  Utilize nutrition screening tool and intervene as necessary  Determine patient's food preferences and provide high-protein, high-caloric foods as appropriate       INTERVENTIONS:  - Monitor oral intake, urinary output, labs, and treatment plans  - Assess nutrition and hydration status and recommend course of action  - Evaluate amount of meals eaten  - Assist patient with eating if necessary   - Allow adequate time for meals  - Recommend/ encourage appropriate diets, oral nutritional supplements, and vitamin/mineral supplements  - Order, calculate, and assess calorie counts as needed  - Recommend, monitor, and adjust tube feedings and TPN/PPN based on assessed needs  - Assess need for intravenous fluids  - Provide specific nutrition/hydration education as appropriate  - Include patient/family/caregiver in decisions related to nutrition  Outcome: Progressing     Problem: METABOLIC, FLUID AND ELECTROLYTES - ADULT  Goal: Electrolytes maintained within normal limits  Description: INTERVENTIONS:  - Monitor labs and assess patient for signs and symptoms of electrolyte imbalances  - Administer electrolyte replacement as ordered  - Monitor response to electrolyte replacements, including repeat lab results as appropriate  - Instruct patient on fluid and nutrition as appropriate  Outcome: Progressing

## 2022-08-01 ENCOUNTER — APPOINTMENT (INPATIENT)
Dept: RADIOLOGY | Facility: HOSPITAL | Age: 77
DRG: 314 | End: 2022-08-01
Payer: COMMERCIAL

## 2022-08-01 LAB
ABO GROUP BLD BPU: NORMAL
ALBUMIN SERPL BCP-MCNC: 1.8 G/DL (ref 3.5–5)
ALP SERPL-CCNC: 138 U/L (ref 46–116)
ALT SERPL W P-5'-P-CCNC: 6 U/L (ref 12–78)
ANION GAP SERPL CALCULATED.3IONS-SCNC: 6 MMOL/L (ref 4–13)
AST SERPL W P-5'-P-CCNC: 30 U/L (ref 5–45)
BASOPHILS # BLD AUTO: 0.04 THOUSANDS/ΜL (ref 0–0.1)
BASOPHILS NFR BLD AUTO: 0 % (ref 0–1)
BILIRUB SERPL-MCNC: 0.55 MG/DL (ref 0.2–1)
BPU ID: NORMAL
BUN SERPL-MCNC: 16 MG/DL (ref 5–25)
C DIFF TOX A+B STL QL IA: NEGATIVE
C DIFF TOX GENS STL QL NAA+PROBE: POSITIVE
CALCIUM ALBUM COR SERPL-MCNC: 9.7 MG/DL (ref 8.3–10.1)
CALCIUM SERPL-MCNC: 7.9 MG/DL (ref 8.3–10.1)
CHLORIDE SERPL-SCNC: 112 MMOL/L (ref 96–108)
CO2 SERPL-SCNC: 21 MMOL/L (ref 21–32)
CREAT SERPL-MCNC: 1.4 MG/DL (ref 0.6–1.3)
CROSSMATCH: NORMAL
EOSINOPHIL # BLD AUTO: 1.24 THOUSAND/ΜL (ref 0–0.61)
EOSINOPHIL NFR BLD AUTO: 9 % (ref 0–6)
ERYTHROCYTE [DISTWIDTH] IN BLOOD BY AUTOMATED COUNT: 22.2 % (ref 11.6–15.1)
GFR SERPL CREATININE-BSD FRML MDRD: 35 ML/MIN/1.73SQ M
GLUCOSE SERPL-MCNC: 76 MG/DL (ref 65–140)
HCT VFR BLD AUTO: 28.7 % (ref 34.8–46.1)
HGB BLD-MCNC: 9.2 G/DL (ref 11.5–15.4)
IMM GRANULOCYTES # BLD AUTO: 0.19 THOUSAND/UL (ref 0–0.2)
IMM GRANULOCYTES NFR BLD AUTO: 1 % (ref 0–2)
LYMPHOCYTES # BLD AUTO: 2.25 THOUSANDS/ΜL (ref 0.6–4.47)
LYMPHOCYTES NFR BLD AUTO: 17 % (ref 14–44)
MCH RBC QN AUTO: 29.8 PG (ref 26.8–34.3)
MCHC RBC AUTO-ENTMCNC: 32.1 G/DL (ref 31.4–37.4)
MCV RBC AUTO: 93 FL (ref 82–98)
MONOCYTES # BLD AUTO: 0.67 THOUSAND/ΜL (ref 0.17–1.22)
MONOCYTES NFR BLD AUTO: 5 % (ref 4–12)
NEUTROPHILS # BLD AUTO: 9.02 THOUSANDS/ΜL (ref 1.85–7.62)
NEUTS SEG NFR BLD AUTO: 68 % (ref 43–75)
NRBC BLD AUTO-RTO: 0 /100 WBCS
PLATELET # BLD AUTO: 379 THOUSANDS/UL (ref 149–390)
PMV BLD AUTO: 9.6 FL (ref 8.9–12.7)
POTASSIUM SERPL-SCNC: 3.1 MMOL/L (ref 3.5–5.3)
PROCALCITONIN SERPL-MCNC: 3.19 NG/ML
PROT SERPL-MCNC: 6.5 G/DL (ref 6.4–8.4)
RBC # BLD AUTO: 3.09 MILLION/UL (ref 3.81–5.12)
SODIUM SERPL-SCNC: 139 MMOL/L (ref 135–147)
UNIT DISPENSE STATUS: NORMAL
UNIT PRODUCT CODE: NORMAL
UNIT PRODUCT VOLUME: 300 ML
UNIT RH: NORMAL
WBC # BLD AUTO: 13.41 THOUSAND/UL (ref 4.31–10.16)

## 2022-08-01 PROCEDURE — 84145 PROCALCITONIN (PCT): CPT | Performed by: INTERNAL MEDICINE

## 2022-08-01 PROCEDURE — 99233 SBSQ HOSP IP/OBS HIGH 50: CPT | Performed by: INTERNAL MEDICINE

## 2022-08-01 PROCEDURE — 85025 COMPLETE CBC W/AUTO DIFF WBC: CPT | Performed by: INTERNAL MEDICINE

## 2022-08-01 PROCEDURE — 99232 SBSQ HOSP IP/OBS MODERATE 35: CPT | Performed by: INTERNAL MEDICINE

## 2022-08-01 PROCEDURE — 76705 ECHO EXAM OF ABDOMEN: CPT

## 2022-08-01 PROCEDURE — 80053 COMPREHEN METABOLIC PANEL: CPT | Performed by: INTERNAL MEDICINE

## 2022-08-01 RX ORDER — POTASSIUM CHLORIDE 14.9 MG/ML
20 INJECTION INTRAVENOUS ONCE
Status: COMPLETED | OUTPATIENT
Start: 2022-08-01 | End: 2022-08-01

## 2022-08-01 RX ADMIN — SUCRALFATE 1 G: 1 TABLET ORAL at 18:29

## 2022-08-01 RX ADMIN — CLONAZEPAM 3 MG: 1 TABLET ORAL at 21:02

## 2022-08-01 RX ADMIN — SUCRALFATE 1 G: 1 TABLET ORAL at 07:11

## 2022-08-01 RX ADMIN — Medication 6 MG: at 21:02

## 2022-08-01 RX ADMIN — MISOPROSTOL 200 MCG: 200 TABLET ORAL at 18:30

## 2022-08-01 RX ADMIN — LEVOTHYROXINE SODIUM 112 MCG: 112 TABLET ORAL at 05:58

## 2022-08-01 RX ADMIN — PIPERACILLIN AND TAZOBACTAM 3.38 G: 36; 4.5 INJECTION, POWDER, FOR SOLUTION INTRAVENOUS at 18:29

## 2022-08-01 RX ADMIN — MISOPROSTOL 200 MCG: 200 TABLET ORAL at 11:09

## 2022-08-01 RX ADMIN — Medication 250 MG: at 18:29

## 2022-08-01 RX ADMIN — MISOPROSTOL 200 MCG: 200 TABLET ORAL at 21:02

## 2022-08-01 RX ADMIN — Medication 125 MG: at 18:29

## 2022-08-01 RX ADMIN — PIPERACILLIN AND TAZOBACTAM 3.38 G: 36; 4.5 INJECTION, POWDER, FOR SOLUTION INTRAVENOUS at 13:28

## 2022-08-01 RX ADMIN — SUCRALFATE 1 G: 1 TABLET ORAL at 21:02

## 2022-08-01 RX ADMIN — Medication 250 MG: at 08:54

## 2022-08-01 RX ADMIN — HEPARIN SODIUM 5000 UNITS: 5000 INJECTION INTRAVENOUS; SUBCUTANEOUS at 05:57

## 2022-08-01 RX ADMIN — MIDODRINE HYDROCHLORIDE 5 MG: 5 TABLET ORAL at 05:58

## 2022-08-01 RX ADMIN — TRAZODONE HYDROCHLORIDE 150 MG: 100 TABLET ORAL at 21:02

## 2022-08-01 RX ADMIN — MISOPROSTOL 200 MCG: 200 TABLET ORAL at 08:54

## 2022-08-01 RX ADMIN — MIDODRINE HYDROCHLORIDE 5 MG: 5 TABLET ORAL at 18:29

## 2022-08-01 RX ADMIN — SUCRALFATE 1 G: 1 TABLET ORAL at 11:09

## 2022-08-01 RX ADMIN — HEPARIN SODIUM 5000 UNITS: 5000 INJECTION INTRAVENOUS; SUBCUTANEOUS at 21:01

## 2022-08-01 RX ADMIN — PANTOPRAZOLE SODIUM 40 MG: 40 TABLET, DELAYED RELEASE ORAL at 18:29

## 2022-08-01 RX ADMIN — PIPERACILLIN AND TAZOBACTAM 3.38 G: 36; 4.5 INJECTION, POWDER, FOR SOLUTION INTRAVENOUS at 06:00

## 2022-08-01 RX ADMIN — MIDODRINE HYDROCHLORIDE 5 MG: 5 TABLET ORAL at 11:09

## 2022-08-01 RX ADMIN — PANTOPRAZOLE SODIUM 40 MG: 40 TABLET, DELAYED RELEASE ORAL at 05:58

## 2022-08-01 RX ADMIN — HEPARIN SODIUM 5000 UNITS: 5000 INJECTION INTRAVENOUS; SUBCUTANEOUS at 14:22

## 2022-08-01 RX ADMIN — POTASSIUM CHLORIDE 20 MEQ: 14.9 INJECTION, SOLUTION INTRAVENOUS at 11:11

## 2022-08-01 NOTE — PLAN OF CARE
Problem: INFECTION - ADULT  Goal: Absence or prevention of progression during hospitalization  Description: INTERVENTIONS:  - Assess and monitor for signs and symptoms of infection  - Monitor lab/diagnostic results  - Monitor all insertion sites, i e  indwelling lines, tubes, and drains  - Monitor endotracheal if appropriate and nasal secretions for changes in amount and color  - [place] appropriate cooling/warming therapies per order  - Administer medications as ordered  - Instruct and encourage patient and family to use good hand hygiene technique  - Identify and instruct in appropriate isolation precautions for identified infection/condition  Outcome: Progressing     Problem: DISCHARGE PLANNING  Goal: Discharge to home or other facility with appropriate resources  Description: INTERVENTIONS:  - Identify barriers to discharge w/patient and caregiver  - Arrange for needed discharge resources and transportation as appropriate  - Identify discharge learning needs (meds, wound care, etc )  - Arrange for interpretive services to assist at discharge as needed  - Refer to Case Management Department for coordinating discharge planning if the patient needs post-hospital services based on physician/advanced practitioner order or complex needs related to functional status, cognitive ability, or social support system  Outcome: Progressing     Problem: Prexisting or High Potential for Compromised Skin Integrity  Goal: Skin integrity is maintained or improved  Description: INTERVENTIONS:  - Identify patients at risk for skin breakdown  - Assess and monitor skin integrity  - Assess and monitor nutrition and hydration status  - Monitor labs   - Assess for incontinence   - Turn and reposition patient  - Assist with mobility/ambulation  - Relieve pressure over bony prominences  - Avoid friction and shearing  - Provide appropriate hygiene as needed including keeping skin clean and dry  - Evaluate need for skin moisturizer/barrier cream  - Collaborate with interdisciplinary team   - Patient/family teaching  - Consider wound care consult   Outcome: Progressing

## 2022-08-01 NOTE — NUTRITION
08/01/22 6612   Recommendations/Interventions   Summary Patient's appetite remains very poor on regular diet, 0-25% meal completions  Patient dislikes ensure "creamy" supplements and is agreeable to try Gelatein and Ensure clear liquid supplements  Discussed pros vs cons of restarting TPN via PICC, all questions answered  No updated weights  Interventions/Recommendations Continue current diet order;Supplement adjust;Obtain current weight   Intervention Comments Ensure enlive and ensure compact discontinued  Gelatein once daily and Ensure clear liquid BID ordered  Calorie count discontinued; not indicated secondary to continued suboptimal po intake  Recommendations to Provider Consider restarting TPN when able to replace PICC line secondary to severe malnutrition and continued inadequate po intake  TPN recommendation to provide 75% of nutritional needs: 450 ml 15% aa, 650 ml 30% dex, 100 ml 20% lipid (1133 kcal, 67 gms pro, 20 gms fat, 1200 ml tv)  Monitor electrolytes

## 2022-08-01 NOTE — PROGRESS NOTES
Progress Note - Infectious Disease   Mary Mora 76 y o  female MRN: [id number]  Unit/Bed#: [facility] 828-01 Encounter: [id number]      Impression/Plan:  1  Septic shock, present on admission, likely secondary to PICC related bacteremia  [facility] has been removed   Patient is clinically much improved   She is now off pressors and out of ICU   The fever had resolved but now the patient spiking fever once again of unclear etiology  Refuse blood cultures yesterday but now agreeing to them  Unclear significance of the patchy findings on chest x-ray  White count is increased  Consideration for the possibility of C difficile colitis  The procalcitonin level has also increased  LFTs have also increased of unclear significance  Her blood pressure has now become a bit more soft  Stool for C diff positive  White cell count has come down significantly since yesterday without treatment for C diff  Possible aspiration pneumonitis versus pneumonia although the patient has weaned off oxygen support  Procalcitonin level is increased  Continue Zosyn for now  Follow up repeat blood cultures  Monitor stool output  Check right upper quadrant ultrasound  Recheck CBC with diff and CMP  Recheck procalcitonin level  Monitor hemodynamics  Additional workup as needed if fever recurs     2  MSSE bacteremia, most likely secondary to PICC line, with patient on TPN  [facility] has been removed   2D echo without vegetation   Repeat blood culture obtained on 07/26 still has growth but this was drawn prior to PICC removal   Repeat blood cultures on 07/28 have no growth thus far      Antibiotics as above  Monitor temperature/WBC  Monitor hemodynamics  Recheck blood cultures x2 sets  Need to treat at least through tomorrow complete 7 days since removal of the PICC     3  NOAH, present on admission, most likely secondary to septic shock   Creatinine is much improved but now with a bump in the creatinine  Antibiotic at full dose    Recheck BMP     4  Small sacral decubitus ulcer, without evidence of cellulitis  Local ulcer care  Monitor      5  Gastric ulcer, stable on PPI      6  Severe protein calorie malnutrition, on outpatient TPN   Given septic shock of PICC origin, risks and benefits of continued TPN should be reassessed      7  Status post distant gastric bypass  8  C difficile infection versus colonization  With a white count coming dramatically down without any treatment and the patient's fever resolving, doubt this is the cause of the patient's recent deterioration  She does have chronic diarrhea that is unchanged  PCR is positive but EIA is negative  For now will begin oral vancomycin prophylaxis while on broad antibiotics  Monitor stool output  Recheck CBC with diff    Discussed the above management the primary service    Antibiotics:  Zosyn 2  Post PICC removal 6    Subjective:  Patient has no fever, chills, sweats; no nausea, vomiting, still with ongoing loose stool; no cough, shortness of breath; no pain  No new symptoms  She seems much improved today  Objective:  Vitals:  Temp:  [97 3 °F (36 3 °C)-98 1 °F (36 7 °C)] 97 3 °F (36 3 °C)  HR:  [72-80] 72  Resp:  [16-18] 18  BP: ()/(51-58) 112/58  SpO2:  [94 %-98 %] 95 %  Temp (24hrs), Av 8 °F (36 6 °C), Min:97 3 °F (36 3 °C), Max:98 1 °F (36 7 °C)  Current: Temperature: (!) 97 3 °F (36 3 °C)    Physical Exam:   General Appearance:  Alert, interactive, nontoxic, no acute distress  Throat: Oropharynx moist without lesions  Lungs:   Decreased breath sounds bilaterally; no wheezes, rhonchi or rales; respirations unlabored   Heart:  RRR; no murmur, rub or gallop   Abdomen:   Soft, non-tender, non-distended, positive bowel sounds  Extremities: No clubbing, cyanosis or edema   Skin: No new rashes or lesions  No draining wounds noted         Labs, Imaging, & Other studies:   All pertinent labs and imaging studies were personally reviewed  Results from last 7 days Lab Units 08/01/22 0419 07/31/22  0448 07/29/22  0835   WBC Thousand/uL 13 41* 25 22* 10 06   HEMOGLOBIN g/dL 9 2* 6 8* 7 7*   PLATELETS Thousands/uL 379 389 328     Results from last 7 days   Lab Units 08/01/22 0419 07/31/22  0448 07/29/22  0835 07/26/22  1901 07/26/22  0408   SODIUM mmol/L 139 136 138   < > 134*   POTASSIUM mmol/L 3 1* 3 3* 3 6   < > 4 0   CHLORIDE mmol/L 112* 107 110*   < > 106   CO2 mmol/L 21 20* 23   < > 21   BUN mg/dL 16 15 15   < > 31*   CREATININE mg/dL 1 40* 1 35* 1 28   < > 1 25   EGFR ml/min/1 73sq m 35 37 39   < > 41   CALCIUM mg/dL 7 9* 8 0* 8 5   < > 8 0*   AST U/L 30 47*  --   --  41   ALT U/L 6* 17  --   --  37   ALK PHOS U/L 138* 117*  --   --  83    < > = values in this interval not displayed  Results from last 7 days   Lab Units 07/31/22  1458 07/31/22  0449 07/28/22  1502 07/26/22  1055 07/26/22  0506 07/26/22  0352 07/25/22  2239 07/25/22  2221   BLOOD CULTURE   --  No Growth at 24 hrs  No Growth at 24 hrs  No Growth at 72 hrs    No Growth at 72 hrs   --  Staphylococcus epidermidis*  --   --  Staphylococcus epidermidis*  Staphylococcus epidermidis*   GRAM STAIN RESULT   --   --   --   --  Gram positive cocci in clusters*  --   --  Gram positive cocci in clusters*  Gram positive cocci in clusters*   MRSA CULTURE ONLY   --   --   --   --   --  No Methicillin Resistant Staphlyococcus aureus (MRSA) isolated  --   --    LEGIONELLA URINARY ANTIGEN   --   --   --   --   --   --  Negative  --    C DIFF TOXIN B BY PCR  Positive*  --   --  Negative  --   --   --   --      Results from last 7 days   Lab Units 08/01/22  0419 07/31/22  0448 07/25/22  2221   PROCALCITONIN ng/ml 3 19* 2 93* 0 60*

## 2022-08-01 NOTE — ASSESSMENT & PLAN NOTE
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

## 2022-08-01 NOTE — ASSESSMENT & PLAN NOTE
MSSE bacteremia  Likely secondary to PICC line, patient was on TPN  PICC line has been removed  2D echo without vegetation  On IV cefazolin per ID  Positive repeat blood culture on 07/26  Negative repeat blood culture on 07/28  Patient had recurrent fever on 07/30  Follow on repeat blood culture on  7/30  ID is following

## 2022-08-01 NOTE — RESTORATIVE TECHNICIAN NOTE
Restorative Technician Note      Patient Name: Carey Tobar     Restorative Tech Visit Date: 8/1/2022  Note Type: Mobility (Pt declined mobility due to "no sleep last night " Provided education and encouragement however pt conitued to decline)  Patient Position Upon Consult: Supine  Patient Position at End of Consult: Supine;  All needs within reach; Bed/Chair alarm activated    Assisted pt in repositioning in bed and sitting up for breakfast    Camila Mcdaniel  DPT, Restorative Technician

## 2022-08-01 NOTE — ASSESSMENT & PLAN NOTE
Present on admission  Likely secondary to PICC line related bacteremia  PICC line has been removed  Currently off pressors  Now with recurrent leukocytosis, hypotension and fever on 7/30  Chest x-ray with Patchy bilateral opacity which could be due to edema or pneumonia    Antibiotic has changed from IV cefazolin to Zosyn  Follow on right upper quadrant ultrasound  Leukocytosis improving

## 2022-08-01 NOTE — ASSESSMENT & PLAN NOTE
Ongoing diarrhea for the past several months  Malabsorption  Now with worsening leukocytosis  Negative C diff toxin on 07/26  Follow on repeat C diff toxin

## 2022-08-01 NOTE — PLAN OF CARE
Problem: INFECTION - ADULT  Goal: Absence or prevention of progression during hospitalization  Description: INTERVENTIONS:  - Assess and monitor for signs and symptoms of infection  - Monitor lab/diagnostic results  - Monitor all insertion sites, i e  indwelling lines, tubes, and drains  - Monitor endotracheal if appropriate and nasal secretions for changes in amount and color  - [place] appropriate cooling/warming therapies per order  - Administer medications as ordered  - Instruct and encourage patient and family to use good hand hygiene technique  - Identify and instruct in appropriate isolation precautions for identified infection/condition  Outcome: Progressing     Problem: Nutrition/Hydration-ADULT  Goal: Nutrient/Hydration intake appropriate for improving, restoring or maintaining nutritional needs  Description: Monitor and assess patient's nutrition/hydration status for malnutrition  Collaborate with interdisciplinary team and initiate plan and interventions as ordered  Monitor patient's weight and dietary intake as ordered or per policy  Utilize nutrition screening tool and intervene as necessary  Determine patient's food preferences and provide high-protein, high-caloric foods as appropriate       INTERVENTIONS:  - Monitor oral intake, urinary output, labs, and treatment plans  - Assess nutrition and hydration status and recommend course of action  - Evaluate amount of meals eaten  - Assist patient with eating if necessary   - Allow adequate time for meals  - Recommend/ encourage appropriate diets, oral nutritional supplements, and vitamin/mineral supplements  - Order, calculate, and assess calorie counts as needed  - Recommend, monitor, and adjust tube feedings and TPN/PPN based on assessed needs  - Assess need for intravenous fluids  - Provide specific nutrition/hydration education as appropriate  - Include patient/family/caregiver in decisions related to nutrition  Outcome: Progressing

## 2022-08-01 NOTE — PROGRESS NOTES
[facility]  Progress Note - Jayden Bullock 1945, 76 y o  female MRN: [id number]  Unit/Bed#: [facility] 828-01 Encounter: [id number]  Primary Care Provider: Juan Antonio Rivera MD   Date and time admitted to hospital: 7/25/2022 10:04 PM    * Septic shock [facility])  Assessment & Plan  Present on admission  Likely secondary to PICC line related bacteremia  PICC line has been removed  Currently off pressors  Now with recurrent leukocytosis, hypotension and fever on 7/30  Chest x-ray with Patchy bilateral opacity which could be due to edema or pneumonia  Antibiotic has changed from IV cefazolin to Zosyn  Follow on right upper quadrant ultrasound  Leukocytosis improving    Gram-positive bacteremia  Assessment & Plan  MSSE bacteremia  Likely secondary to PICC line, patient was on TPN  PICC line has been removed  2D echo without vegetation  On IV cefazolin per ID  Positive repeat blood culture on 07/26  Negative repeat blood culture on 07/28  Patient had recurrent fever on 07/30  Follow on repeat blood culture on  7/30  ID is following    Severe protein-calorie malnutrition ([address] )  Assessment & Plan  Malnutrition Findings:   Adult Malnutrition type: Chronic illness  Adult Degree of Malnutrition: Other severe protein calorie malnutrition  Malnutrition Characteristics: Fat loss, Muscle loss, Inadequate energy                  360 Statement: Severe malnutrition in setting of chronic illness related to malabsorption and poor po intake as evidenced by overt loss of body fat and muscle mass observed (severe orbital wasting , severe trapezius wasting, severe quadricep wasting), and pt reported poor po intakes  Pt very  loquacious  BMI Findings: Body mass index is 19 91 kg/m²       Patient on chronic TPN post remote gastric bypass  Currently TPN on hold  Continue with oral diet  Nutrition recommending resuming TPN once medically appropriate to me daily needs    Diarrhea  Assessment & Plan  Ongoing diarrhea for the past several months  Malabsorption  Now with worsening leukocytosis  Negative C diff toxin on   Follow on repeat C diff toxin    Sacral ulcer ([address] )  Assessment & Plan  Sacral decubitus ulcer without evidence of cellulitis  Continue with local care    H/O bariatric surgery - bypass  Assessment & Plan  · 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 at [facility]  · Outpatient bariatric follow-up      Anemia  Assessment & Plan  Anemia of chronic disease  Status post blood transfusion  and   Improving  Monitor    Depression  Assessment & Plan  Continue home med of trazodone and Klonopin    Hypothyroidism  Assessment & Plan  Continue levothyroxine      VTE Pharmacologic Prophylaxis:   High Risk (Score >/= 5) - Pharmacological DVT Prophylaxis Ordered: heparin  Sequential Compression Devices Ordered  Patient Centered Rounds: I performed bedside rounds with nursing staff today  Discussions with Specialists or Other Care Team Provider:     Education and Discussions with Family / Patient: Updated  (sister) via phone  Yesterday    Time Spent for Care: 45 minutes  More than 50% of total time spent on counseling and coordination of care as described above  Current Length of Stay: 6 day(s)  Current Patient Status: Inpatient   Certification Statement: The patient will continue to require additional inpatient hospital stay due to Management of sepsis  Discharge Plan: Anticipate discharge in >72 hrs to rehab facility  Code Status: Level 1 - Full Code    Subjective:   Patient seen examined  Comfortable in bed  No further fever  Still with diarrhea    Objective:     Vitals:   Temp (24hrs), Av 9 °F (36 6 °C), Min:97 3 °F (36 3 °C), Max:98 1 °F (36 7 °C)    Temp:  [97 3 °F (36 3 °C)-98 1 °F (36 7 °C)] 97 3 °F (36 3 °C)  HR:  [72-86] 72  Resp:  [16-20] 18  BP: ()/(51-58) 112/58  SpO2:  [94 %-98 %] 95 %  Body mass index is 19 91 kg/m²       Input and Output Summary (last 24 hours): Intake/Output Summary (Last 24 hours) at 8/1/2022 1057  Last data filed at 7/31/2022 1750  Gross per 24 hour   Intake 1700 ml   Output --   Net 1700 ml       Physical Exam:   Physical Exam     Patient is awake in no acute distress  Comfortable in bed  Cachectic chronically ill-appearing  Lung clear to auscultation bilateral anteriorly  Heart with mild tachycardia  Abdomen soft nontender  Lower extremities no edema       Additional Data:     Labs:  Results from last 7 days   Lab Units 08/01/22  0419   WBC Thousand/uL 13 41*   HEMOGLOBIN g/dL 9 2*   HEMATOCRIT % 28 7*   PLATELETS Thousands/uL 379   NEUTROS PCT % 68   LYMPHS PCT % 17   MONOS PCT % 5   EOS PCT % 9*     Results from last 7 days   Lab Units 08/01/22  0419   SODIUM mmol/L 139   POTASSIUM mmol/L 3 1*   CHLORIDE mmol/L 112*   CO2 mmol/L 21   BUN mg/dL 16   CREATININE mg/dL 1 40*   ANION GAP mmol/L 6   CALCIUM mg/dL 7 9*   ALBUMIN g/dL 1 8*   TOTAL BILIRUBIN mg/dL 0 55   ALK PHOS U/L 138*   ALT U/L 6*   AST U/L 30   GLUCOSE RANDOM mg/dL 76     Results from last 7 days   Lab Units 07/25/22  2221   INR  1 20*     Results from last 7 days   Lab Units 07/31/22  1137   POC GLUCOSE mg/dl 94         Results from last 7 days   Lab Units 08/01/22  0419 07/31/22  0448 07/25/22  2221   LACTIC ACID mmol/L  --   --  1 8   PROCALCITONIN ng/ml 3 19* 2 93* 0 60*       Lines/Drains:  Invasive Devices  Report    Peripheral Intravenous Line  Duration           Peripheral IV 07/31/22 Left;Proximal;Ventral (anterior) Forearm 1 day          Drain  Duration           External Urinary Catheter 4 days                      Imaging: No pertinent imaging reviewed  Recent Cultures (last 7 days):   Results from last 7 days   Lab Units 07/31/22  0449 07/28/22  1502 07/26/22  1055 07/26/22  0506 07/25/22  2239 07/25/22  2221   BLOOD CULTURE  Received in Microbiology Lab  Culture in Progress  Received in Microbiology Lab  Culture in Progress   No Growth at 72 hrs  No Growth at 72 hrs   --  Staphylococcus epidermidis*  --  Staphylococcus epidermidis*  Staphylococcus epidermidis*   GRAM STAIN RESULT   --   --   --  Gram positive cocci in clusters*  --  Gram positive cocci in clusters*  Gram positive cocci in clusters*   LEGIONELLA URINARY ANTIGEN   --   --   --   --  Negative  --    C DIFF TOXIN B BY PCR   --   --  Negative  --   --   --        Last 24 Hours Medication List:   Current Facility-Administered Medications   Medication Dose Route Frequency Provider Last Rate    acetaminophen  650 mg Oral Q4H PRN Cathryne Sicard, DO      calcium carbonate  500 mg Oral Daily PRN TOLU Rowland      clonazePAM  3 mg Oral HS Mario Thurston DO      heparin (porcine)  5,000 Units Subcutaneous Q8H [facility] Yu Meehan DO      levothyroxine  112 mcg Oral Early Morning Yu Meehan DO      loperamide  2 mg Oral TID PRN Cathryne Sicard, DO      melatonin  6 mg Oral HS Mario Thurston DO      midodrine  5 mg Oral TID  Mario Thurston DO      miSOPROStol  200 mcg Oral 4x Daily (with meals and at bedtime) Yu Meehan DO      ondansetron  4 mg Intravenous Q8H PRN Yu Meehan,       pantoprazole  40 mg Oral BID  Mario Thurston DO      piperacillin-tazobactam  3 375 g Intravenous Q6H Aaron Zamora MD 3 375 g (08/01/22 0600)    potassium chloride  20 mEq Intravenous Once Cathryne Sicard, DO      saccharomyces boulardii  250 mg Oral BID Cathryne Sicard, DO      sodium chloride  100 mL/hr Intravenous Continuous Cathryne Sicard,  mL/hr (07/31/22 0906)    sucralfate  1 g Oral 4x Daily (AC & HS) Yu Meehan DO      traZODone  150 mg Oral HS Yu Meehan DO          Today, Patient Was Seen By: Cathryne Sicard, DO    **Please Note: This note may have been constructed using a voice recognition system  **

## 2022-08-02 LAB
ANION GAP SERPL CALCULATED.3IONS-SCNC: 5 MMOL/L (ref 4–13)
BACTERIA BLD CULT: NORMAL
BACTERIA BLD CULT: NORMAL
BASOPHILS # BLD AUTO: 0.05 THOUSANDS/ΜL (ref 0–0.1)
BASOPHILS NFR BLD AUTO: 0 % (ref 0–1)
BUN SERPL-MCNC: 13 MG/DL (ref 5–25)
CALCIUM SERPL-MCNC: 8.1 MG/DL (ref 8.3–10.1)
CHLORIDE SERPL-SCNC: 112 MMOL/L (ref 96–108)
CO2 SERPL-SCNC: 20 MMOL/L (ref 21–32)
CREAT SERPL-MCNC: 1.34 MG/DL (ref 0.6–1.3)
EOSINOPHIL # BLD AUTO: 1.3 THOUSAND/ΜL (ref 0–0.61)
EOSINOPHIL NFR BLD AUTO: 11 % (ref 0–6)
ERYTHROCYTE [DISTWIDTH] IN BLOOD BY AUTOMATED COUNT: 22.1 % (ref 11.6–15.1)
GFR SERPL CREATININE-BSD FRML MDRD: 37 ML/MIN/1.73SQ M
GLUCOSE SERPL-MCNC: 87 MG/DL (ref 65–140)
HCT VFR BLD AUTO: 26.9 % (ref 34.8–46.1)
HGB BLD-MCNC: 8.6 G/DL (ref 11.5–15.4)
IMM GRANULOCYTES # BLD AUTO: 0.13 THOUSAND/UL (ref 0–0.2)
IMM GRANULOCYTES NFR BLD AUTO: 1 % (ref 0–2)
LYMPHOCYTES # BLD AUTO: 2.58 THOUSANDS/ΜL (ref 0.6–4.47)
LYMPHOCYTES NFR BLD AUTO: 23 % (ref 14–44)
MAGNESIUM SERPL-MCNC: 1.8 MG/DL (ref 1.6–2.6)
MCH RBC QN AUTO: 30 PG (ref 26.8–34.3)
MCHC RBC AUTO-ENTMCNC: 32 G/DL (ref 31.4–37.4)
MCV RBC AUTO: 94 FL (ref 82–98)
MONOCYTES # BLD AUTO: 0.79 THOUSAND/ΜL (ref 0.17–1.22)
MONOCYTES NFR BLD AUTO: 7 % (ref 4–12)
NEUTROPHILS # BLD AUTO: 6.61 THOUSANDS/ΜL (ref 1.85–7.62)
NEUTS SEG NFR BLD AUTO: 58 % (ref 43–75)
NRBC BLD AUTO-RTO: 0 /100 WBCS
PLATELET # BLD AUTO: 413 THOUSANDS/UL (ref 149–390)
PMV BLD AUTO: 9.5 FL (ref 8.9–12.7)
POTASSIUM SERPL-SCNC: 3 MMOL/L (ref 3.5–5.3)
RBC # BLD AUTO: 2.87 MILLION/UL (ref 3.81–5.12)
SODIUM SERPL-SCNC: 137 MMOL/L (ref 135–147)
WBC # BLD AUTO: 11.46 THOUSAND/UL (ref 4.31–10.16)

## 2022-08-02 PROCEDURE — 99233 SBSQ HOSP IP/OBS HIGH 50: CPT | Performed by: INTERNAL MEDICINE

## 2022-08-02 PROCEDURE — 83735 ASSAY OF MAGNESIUM: CPT | Performed by: INTERNAL MEDICINE

## 2022-08-02 PROCEDURE — 80048 BASIC METABOLIC PNL TOTAL CA: CPT | Performed by: INTERNAL MEDICINE

## 2022-08-02 PROCEDURE — 85025 COMPLETE CBC W/AUTO DIFF WBC: CPT | Performed by: INTERNAL MEDICINE

## 2022-08-02 RX ORDER — POTASSIUM CHLORIDE 14.9 MG/ML
20 INJECTION INTRAVENOUS
Status: COMPLETED | OUTPATIENT
Start: 2022-08-02 | End: 2022-08-02

## 2022-08-02 RX ADMIN — PIPERACILLIN AND TAZOBACTAM 3.38 G: 36; 4.5 INJECTION, POWDER, FOR SOLUTION INTRAVENOUS at 11:15

## 2022-08-02 RX ADMIN — SUCRALFATE 1 G: 1 TABLET ORAL at 22:19

## 2022-08-02 RX ADMIN — HEPARIN SODIUM 5000 UNITS: 5000 INJECTION INTRAVENOUS; SUBCUTANEOUS at 05:20

## 2022-08-02 RX ADMIN — MIDODRINE HYDROCHLORIDE 5 MG: 5 TABLET ORAL at 11:15

## 2022-08-02 RX ADMIN — MISOPROSTOL 200 MCG: 200 TABLET ORAL at 11:14

## 2022-08-02 RX ADMIN — LEVOTHYROXINE SODIUM 112 MCG: 112 TABLET ORAL at 05:19

## 2022-08-02 RX ADMIN — Medication 250 MG: at 17:09

## 2022-08-02 RX ADMIN — PANTOPRAZOLE SODIUM 40 MG: 40 TABLET, DELAYED RELEASE ORAL at 05:19

## 2022-08-02 RX ADMIN — Medication 125 MG: at 05:18

## 2022-08-02 RX ADMIN — CLONAZEPAM 3 MG: 1 TABLET ORAL at 22:19

## 2022-08-02 RX ADMIN — PANTOPRAZOLE SODIUM 40 MG: 40 TABLET, DELAYED RELEASE ORAL at 17:09

## 2022-08-02 RX ADMIN — POTASSIUM CHLORIDE 20 MEQ: 14.9 INJECTION, SOLUTION INTRAVENOUS at 14:17

## 2022-08-02 RX ADMIN — PIPERACILLIN AND TAZOBACTAM 3.38 G: 36; 4.5 INJECTION, POWDER, FOR SOLUTION INTRAVENOUS at 00:28

## 2022-08-02 RX ADMIN — PIPERACILLIN AND TAZOBACTAM 3.38 G: 36; 4.5 INJECTION, POWDER, FOR SOLUTION INTRAVENOUS at 05:18

## 2022-08-02 RX ADMIN — MISOPROSTOL 200 MCG: 200 TABLET ORAL at 22:19

## 2022-08-02 RX ADMIN — SUCRALFATE 1 G: 1 TABLET ORAL at 17:09

## 2022-08-02 RX ADMIN — Medication 6 MG: at 22:19

## 2022-08-02 RX ADMIN — SUCRALFATE 1 G: 1 TABLET ORAL at 11:14

## 2022-08-02 RX ADMIN — TRAZODONE HYDROCHLORIDE 150 MG: 100 TABLET ORAL at 22:19

## 2022-08-02 RX ADMIN — POTASSIUM CHLORIDE 20 MEQ: 14.9 INJECTION, SOLUTION INTRAVENOUS at 12:51

## 2022-08-02 RX ADMIN — HEPARIN SODIUM 5000 UNITS: 5000 INJECTION INTRAVENOUS; SUBCUTANEOUS at 22:19

## 2022-08-02 RX ADMIN — MISOPROSTOL 200 MCG: 200 TABLET ORAL at 17:10

## 2022-08-02 RX ADMIN — PIPERACILLIN AND TAZOBACTAM 3.38 G: 36; 4.5 INJECTION, POWDER, FOR SOLUTION INTRAVENOUS at 17:15

## 2022-08-02 RX ADMIN — Medication 125 MG: at 17:09

## 2022-08-02 RX ADMIN — Medication 250 MG: at 08:05

## 2022-08-02 RX ADMIN — MIDODRINE HYDROCHLORIDE 5 MG: 5 TABLET ORAL at 05:19

## 2022-08-02 RX ADMIN — MISOPROSTOL 200 MCG: 200 TABLET ORAL at 08:05

## 2022-08-02 RX ADMIN — MIDODRINE HYDROCHLORIDE 5 MG: 5 TABLET ORAL at 17:14

## 2022-08-02 RX ADMIN — HEPARIN SODIUM 5000 UNITS: 5000 INJECTION INTRAVENOUS; SUBCUTANEOUS at 13:04

## 2022-08-02 RX ADMIN — SODIUM CHLORIDE 75 ML/HR: 0.9 INJECTION, SOLUTION INTRAVENOUS at 16:07

## 2022-08-02 RX ADMIN — SODIUM CHLORIDE 75 ML/HR: 0.9 INJECTION, SOLUTION INTRAVENOUS at 00:30

## 2022-08-02 RX ADMIN — SUCRALFATE 1 G: 1 TABLET ORAL at 05:19

## 2022-08-02 NOTE — PROGRESS NOTES
[address] [facility]  Progress Note - Porfirio Rowe 1945, 76 y o  female MRN: [id number]  Unit/Bed#: [facility] 828-01 Encounter: [id number]  Primary Care Provider: Felton Jimenez MD   Date and time admitted to hospital: 7/25/2022 10:04 PM    * Septic shock [facility])  Assessment & Plan  - presented with septic shock secondary to PICC line related bacteremia  - PICC line has since been removed  - initial improvement on IV cefazolin for treatment of bacteremia, however developed leukocytosis, hypotension, and fever on 07/30    - now concern for gallbladder related infection based on abnormal right upper quadrant ultrasound  - will need MRI/MRCP for further delineation  - continue empiric Zosyn  - appreciate ID recommendations      Gram-positive bacteremia  Assessment & Plan  - MSSA bacteremia secondary to PICC line infection; had previously been on TPN  - PICC line has since been removed  - echocardiogram without vegetation  - repeat blood cultures have cleared    - previous on on IV cefazolin based on blood cultures  - broaden to Zosyn and patient developed persistent fever and infectious symptoms  - continue on Zosyn; appreciate ID recommendations    Diarrhea  Assessment & Plan  - Ongoing diarrhea for the past several months  - Malabsorption    - continue C diff prophylaxis with p o  vancomycin    [address] )  Assessment & Plan  - Sacral decubitus ulcer without evidence of cellulitis  - Continue with local care    Severe protein-calorie malnutrition ([facility] )  Assessment & Plan  Malnutrition Findings:   Adult Malnutrition type: Chronic illness  Adult Degree of Malnutrition: Other severe protein calorie malnutrition  Malnutrition Characteristics: Fat loss, Muscle loss, Inadequate energy                  360 Statement: Severe malnutrition in setting of chronic illness related to malabsorption and poor po intake as evidenced by overt loss of body fat and muscle mass observed (severe orbital wasting , severe trapezius wasting, severe quadricep wasting), and pt reported poor po intakes  Pt very  loquacious  BMI Findings: Body mass index is 19 91 kg/m²  Patient on chronic TPN post remote gastric bypass  Currently TPN on hold  Continue with oral diet  Nutrition recommending resuming TPN once medically appropriate to me daily needs    H/O bariatric surgery - bypass  Assessment & Plan  - 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 at [facility] bariatric follow-up      Anemia  Assessment & Plan  - Anemia of chronic disease  - Status post blood transfusion 7/26 and 7/31  - cont to monitor    Depression  Assessment & Plan  - Continue home med of trazodone and Klonopin    Hypothyroidism  Assessment & Plan  - Continue levothyroxine        VTE Pharmacologic Prophylaxis:   High Risk (Score >/= 5) - Pharmacological DVT Prophylaxis Ordered: heparin  Sequential Compression Devices Ordered  Patient Centered Rounds: I performed bedside rounds with nursing staff today  Discussions with Specialists or Other Care Team Provider: KEITH SPRING  Education and Discussions with Family / Patient: Updated  (daughter) via phone  Time Spent for Care: 30 minutes  More than 50% of total time spent on counseling and coordination of care as described above  Current Length of Stay: 7 day(s)  Current Patient Status: Inpatient   Certification Statement: The patient will continue to require additional inpatient hospital stay due to fever, IV abx's, further diagnostic workup  Discharge Plan: Anticipate discharge in >72 hrs to rehab facility  Code Status: Level 1 - Full Code    Subjective:   Patient seen examined  Following up for the numerous issues listed above  Afebrile overnight  Patient still feeling somewhat bloated and nauseous  Denies any pain  No cough or shortness of breath  Just generally feels unwell and fatigued      Objective:     Vitals:   Temp (24hrs), Av 1 °F (36 7 °C), Min:97 9 °F (36 6 °C), Max:98 4 °F (36 9 °C)    Temp:  [97 9 °F (36 6 °C)-98 4 °F (36 9 °C)] 98 4 °F (36 9 °C)  HR:  [76-77] 77  Resp:  [17-18] 18  BP: (100-117)/(48-59) 105/59  SpO2:  [93 %-95 %] 95 %  Body mass index is 19 91 kg/m²  Input and Output Summary (last 24 hours): Intake/Output Summary (Last 24 hours) at 2022 1534  Last data filed at 2022 0432  Gross per 24 hour   Intake 1240 ml   Output 600 ml   Net 640 ml       PHYSICAL EXAM:    Vitals signs reviewed  Constitutional   Awake and cooperative  Chronically ill-appearing but nontoxic  Head/Neck   Normocephalic  Atraumatic  HEENT   No scleral icterus  EOMI  Heart   Regular rate and rhythm  No murmers  Lungs   Clear to auscultation bilaterally  Respirations unlaboured  Abdomen   Soft  Nondistended  Mild right upper quadrant tenderness to palpation  Skin   Skin color normal  No rashes  Extremities   No deformities  No peripheral edema  Neuro   Alert and oriented  No new deficits  Psych   Mood stable   Affect normal          Additional Data:     Labs:  Results from last 7 days   Lab Units 22  0600   WBC Thousand/uL 11 46*   HEMOGLOBIN g/dL 8 6*   HEMATOCRIT % 26 9*   PLATELETS Thousands/uL 413*   NEUTROS PCT % 58   LYMPHS PCT % 23   MONOS PCT % 7   EOS PCT % 11*     Results from last 7 days   Lab Units 22  0600 22  0419   SODIUM mmol/L 137 139   POTASSIUM mmol/L 3 0* 3 1*   CHLORIDE mmol/L 112* 112*   CO2 mmol/L 20* 21   BUN mg/dL 13 16   CREATININE mg/dL 1 34* 1 40*   ANION GAP mmol/L 5 6   CALCIUM mg/dL 8 1* 7 9*   ALBUMIN g/dL  --  1 8*   TOTAL BILIRUBIN mg/dL  --  0 55   ALK PHOS U/L  --  138*   ALT U/L  --  6*   AST U/L  --  30   GLUCOSE RANDOM mg/dL 87 76         Results from last 7 days   Lab Units 22  1137   POC GLUCOSE mg/dl 94         Results from last 7 days   Lab Units 22  0419 22  0448   PROCALCITONIN ng/ml 3 19* 2 93* Lines/Drains:  Invasive Devices  Report    Peripheral Intravenous Line  Duration           Peripheral IV 08/02/22 Right Wrist <1 day                      Imaging: Reviewed radiology reports from this admission including: Right upper quadrant ultrasound    Recent Cultures (last 7 days):   Results from last 7 days   Lab Units 07/31/22  1458 07/31/22  0449 07/28/22  1502   BLOOD CULTURE   --  No Growth at 48 hrs  No Growth at 48 hrs  No Growth After 4 Days  No Growth After 4 Days     C DIFF TOXIN B BY PCR  Positive*  --   --        Last 24 Hours Medication List:   Current Facility-Administered Medications   Medication Dose Route Frequency Provider Last Rate    acetaminophen  650 mg Oral Q4H PRN Andi Jarek, DO      calcium carbonate  500 mg Oral Daily PRN TOLU Rowland      clonazePAM  3 mg Oral HS Bedayron Erickson, DO      heparin (porcine)  5,000 Units Subcutaneous Q8H [address] Ahmet Erickson, DO      levothyroxine  112 mcg Oral Early Morning Ahmet Erickson, DO      loperamide  2 mg Oral TID PRN Andi Jarek, DO      melatonin  6 mg Oral HS Ahmet Erickson, DO      midodrine  5 mg Oral TID AC Mario Thurston, DO      miSOPROStol  200 mcg Oral 4x Daily (with meals and at bedtime) Ahmet Erickson, DO      ondansetron  4 mg Intravenous Q8H PRN Ahmet Erickson, DO      pantoprazole  40 mg Oral BID AC Mario Bertrand, DO      piperacillin-tazobactam  3 375 g Intravenous Q6H Alen Thompson MD 3 375 g (08/02/22 1115)    potassium chloride  20 mEq Intravenous Q2H Charlotte Hughes DO 20 mEq (08/02/22 1417)    saccharomyces boulardii  250 mg Oral BID Andi Jarek, DO      sodium chloride  75 mL/hr Intravenous Continuous Andi Jarek, DO 75 mL/hr (08/02/22 0030)    sucralfate  1 g Oral 4x Daily (AC & HS) Bedayron Erickson, DO      traZODone  150 mg Oral HS Bedayron Erickson, DO      vancomycin  125 mg Oral Q12H [address] Alen Thompson MD          Today, Patient Was Seen By: Charlotte Hughes DO    **Please Note: This note may have been constructed using a voice recognition system  **

## 2022-08-02 NOTE — ASSESSMENT & PLAN NOTE
- 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 at [address] [facility] bariatric follow-up

## 2022-08-02 NOTE — PROGRESS NOTES
Progress Note - Infectious Disease   Jimena Sandhu 76 y o  female MRN: [id number]  Unit/Bed#: [facility] 828-01 Encounter: [id number]      Impression/Plan:  1  Septic shock, present on admission, likely secondary to PICC related bacteremia  [facility] has been removed   Patient is clinically much improved   She is now off pressors and out of ICU   The fever had resolved but now the patient spiking fever once again of unclear etiology   Refuse blood cultures yesterday but now agreeing to them   Unclear significance of the patchy findings on chest x-ray   White count is increased   Consideration for the possibility of C difficile colitis   The procalcitonin level has also increased   LFTs have also increased of unclear significance   Her blood pressure has now become a bit more soft  Stool for C diff positive  White cell count has come down significantly since yesterday without treatment for C diff  Possible aspiration pneumonitis versus pneumonia although the patient has weaned off oxygen support  Procalcitonin level is increased  Right upper quadrant ultrasound with possible acute cholecystitis  Continue Zosyn for now  Follow up repeat blood cultures  Monitor stool output  Check MRI MRCP  Recheck CBC with diff and CMP  Recheck procalcitonin level  Monitor hemodynamics  Additional workup as needed if fever recurs     2  MSSE bacteremia, most likely secondary to PICC line, with patient on TPN  [facility] has been removed   2D echo without vegetation   Repeat blood culture obtained on 07/26 still has growth but this was drawn prior to PICC removal   Repeat blood cultures on 07/28 have no growth thus far      Antibiotics as above  Monitor temperature/WBC  Monitor hemodynamics  Follow up repeat blood cultures  Antibiotics as above      3  NOAH, present on admission, most likely secondary to septic shock   Creatinine is much improved but now with a bump in the creatinine  Antibiotic at full dose    Recheck BMP     4  Small sacral decubitus ulcer, without evidence of cellulitis  Local ulcer care  Monitor      5  Gastric ulcer, stable on PPI      6  Severe protein calorie malnutrition, on outpatient TPN   Given septic shock of PICC origin, risks and benefits of continued TPN should be reassessed      7  Status post distant gastric bypass      8  C difficile infection versus colonization  With a white count coming dramatically down without any treatment and the patient's fever resolving, doubt this is the cause of the patient's recent deterioration  She does have chronic diarrhea that is unchanged  PCR is positive but EIA is negative  Continue oral vancomycin prophylaxis for now  Monitor stool output  Recheck CBC with diff    Discussed the above management plan with the primary service    Antibiotics:  Zosyn 3  Post PICC removal 7    Subjective:  Patient has no fever, chills, sweats; no nausea, vomiting, loose stool which seems chronic; no cough, shortness of breath; some right-sided abdominal pain  No new symptoms  Objective:  Vitals:  Temp:  [97 9 °F (36 6 °C)-98 4 °F (36 9 °C)] 97 9 °F (36 6 °C)  HR:  [76-80] 76  Resp:  [17-20] 17  BP: (100-125)/(48-58) 117/56  SpO2:  [93 %-96 %] 95 %  Temp (24hrs), Av 1 °F (36 7 °C), Min:97 9 °F (36 6 °C), Max:98 4 °F (36 9 °C)  Current: Temperature: 97 9 °F (36 6 °C)    Physical Exam:   General Appearance:  Alert, interactive, nontoxic, no acute distress  Throat: Oropharynx moist without lesions  Lungs:   Clear to auscultation bilaterally; no wheezes, rhonchi or rales; respirations unlabored   Heart:  RRR; no murmur, rub or gallop   Abdomen:   Soft, mild right-sided tenderness, non-distended, positive bowel sounds  Extremities: No clubbing, cyanosis or edema   Skin: No new rashes or lesions  No draining wounds noted         Labs, Imaging, & Other studies:   All pertinent labs and imaging studies were personally reviewed  Results from last 7 days   Lab Units 22  0600 08/01/22  0419 07/31/22  0448   WBC Thousand/uL 11 46* 13 41* 25 22*   HEMOGLOBIN g/dL 8 6* 9 2* 6 8*   PLATELETS Thousands/uL 413* 379 389     Results from last 7 days   Lab Units 08/02/22  0600 08/01/22  0419 07/31/22  0448   SODIUM mmol/L 137 139 136   POTASSIUM mmol/L 3 0* 3 1* 3 3*   CHLORIDE mmol/L 112* 112* 107   CO2 mmol/L 20* 21 20*   BUN mg/dL 13 16 15   CREATININE mg/dL 1 34* 1 40* 1 35*   EGFR ml/min/1 73sq m 37 35 37   CALCIUM mg/dL 8 1* 7 9* 8 0*   AST U/L  --  30 47*   ALT U/L  --  6* 17   ALK PHOS U/L  --  138* 117*     Results from last 7 days   Lab Units 07/31/22  1458 07/31/22  0449 07/28/22  1502   BLOOD CULTURE   --  No Growth at 48 hrs  No Growth at 48 hrs  No Growth After 4 Days  No Growth After 4 Days     C DIFF TOXIN B BY PCR  Positive*  --   --      Results from last 7 days   Lab Units 08/01/22  0419 07/31/22  0448   PROCALCITONIN ng/ml 3 19* 2 93*        right upper quadrant ultrasound-possible acute cholecystitis    Images personally reviewed by me in PACS

## 2022-08-02 NOTE — ASSESSMENT & PLAN NOTE
- presented with septic shock secondary to PICC line related bacteremia  - PICC line has since been removed  - initial improvement on IV cefazolin for treatment of bacteremia, however developed leukocytosis, hypotension, and fever on 07/30    - now concern for gallbladder related infection based on abnormal right upper quadrant ultrasound  - will need MRI/MRCP for further delineation  - continue empiric Zosyn  - appreciate ID recommendations

## 2022-08-02 NOTE — PHYSICAL THERAPY NOTE
Physical Therapy Cancellation Note    Patient's Name: Coleman Brown       08/02/22 1425   PT Last Visit   PT Visit Date 08/02/22   Note Type   Note Type Cancelled Session   Cancel Reasons Other  (Met with pt at bedside, reports abdominal pain, encouraged mobility and OOB activity, pt became agitated and stated "I just want to be left alone "  Will follow for PT tx session )       Leslye Barger, PT, DPT, GCS

## 2022-08-02 NOTE — CASE MANAGEMENT
Case Management Discharge Planning Note    Patient name Yari Marie  [facility] [id number]  : 1945 Date 2022       Current Admission Date: 2022  Current Admission Diagnosis:Septic shock [facility])   Patient Active Problem List    Diagnosis Date Noted    Diarrhea 2022    Septic shock ([facility] [address] ) 2022    [facility] 20[facility]    Gram-positive bacteremia 2022    Severe protein-calorie malnutrition ([facility] [facility] ) 2022    Bilateral leg edema 2022    Ambulatory dysfunction 2022    Acute blood loss anemia 2022    Gastric ulcer 2022    Fever 2022    Anemia 2022    Dyspnea on exertion 2022    Generalized weakness 2022    Hyponatremia 2022    Abnormal CT scan 2022    H/O bariatric surgery - bypass 2022    Cerumen debris on tympanic membrane of right ear 2022    Nasal congestion 2022    Bilateral hearing loss 2022    Fibromyalgia syndrome 2021    Osteopenia of both forearms 2021    Medicare annual wellness visit, subsequent 2021    Shortness of breath 2021    Acute bronchitis 2021    COVID-19 2021    Dysphasia 2020    Epigastric pain 2020    Hypothyroidism 2020    Gastroesophageal reflux disease without esophagitis 2020    Depression 2020    Fibromyalgia, primary 2020    Iron deficiency 2020    Numbness of foot 2020      LOS (days): 7  Geometric Mean LOS (GMLOS) (days): 4 80  Days to GMLOS:-2 7     OBJECTIVE:  Risk of Unplanned Readmission Score: 26 15         Current admission status: Inpatient   Preferred Pharmacy:   [facility] [address] [facility], [address] [place] [address] [address] [facility] [address]  Phone: [phone] Fax: [phone]    Primary Care Provider: Kylee Rodriguez MD    Primary Insurance: [facility] REP  Secondary Insurance:     DISCHARGE DETAILS:               Other Referral/Resources/Interventions Provided:  Referral Comments: Left VM for pt's brother Topher Garcia [phone] to discuss rehab

## 2022-08-02 NOTE — ASSESSMENT & PLAN NOTE
- Ongoing diarrhea for the past several months  - Malabsorption    - continue C diff prophylaxis with p o  vancomycin

## 2022-08-02 NOTE — ASSESSMENT & PLAN NOTE
- MSSA bacteremia secondary to PICC line infection; had previously been on TPN  - PICC line has since been removed  - echocardiogram without vegetation  - repeat blood cultures have cleared    - previous on on IV cefazolin based on blood cultures  - broaden to Zosyn and patient developed persistent fever and infectious symptoms  - continue on Zosyn; appreciate ID recommendations

## 2022-08-02 NOTE — PLAN OF CARE
Problem: INFECTION - ADULT  Goal: Absence or prevention of progression during hospitalization  Description: INTERVENTIONS:  - Assess and monitor for signs and symptoms of infection  - Monitor lab/diagnostic results  - Monitor all insertion sites, i e  indwelling lines, tubes, and drains  - Monitor endotracheal if appropriate and nasal secretions for changes in amount and color  - [place] appropriate cooling/warming therapies per order  - Administer medications as ordered  - Instruct and encourage patient and family to use good hand hygiene technique  - Identify and instruct in appropriate isolation precautions for identified infection/condition  Outcome: Progressing

## 2022-08-03 ENCOUNTER — APPOINTMENT (INPATIENT)
Dept: RADIOLOGY | Facility: HOSPITAL | Age: 77
DRG: 314 | End: 2022-08-03
Payer: COMMERCIAL

## 2022-08-03 LAB
ALBUMIN SERPL BCP-MCNC: 1.5 G/DL (ref 3.5–5)
ALP SERPL-CCNC: 103 U/L (ref 46–116)
ALT SERPL W P-5'-P-CCNC: 7 U/L (ref 12–78)
ANION GAP SERPL CALCULATED.3IONS-SCNC: 8 MMOL/L (ref 4–13)
AST SERPL W P-5'-P-CCNC: 25 U/L (ref 5–45)
BILIRUB SERPL-MCNC: 0.4 MG/DL (ref 0.2–1)
BUN SERPL-MCNC: 10 MG/DL (ref 5–25)
CALCIUM ALBUM COR SERPL-MCNC: 10.2 MG/DL (ref 8.3–10.1)
CALCIUM SERPL-MCNC: 8.2 MG/DL (ref 8.3–10.1)
CHLORIDE SERPL-SCNC: 114 MMOL/L (ref 96–108)
CO2 SERPL-SCNC: 18 MMOL/L (ref 21–32)
CREAT SERPL-MCNC: 1.19 MG/DL (ref 0.6–1.3)
ERYTHROCYTE [DISTWIDTH] IN BLOOD BY AUTOMATED COUNT: 22.1 % (ref 11.6–15.1)
GFR SERPL CREATININE-BSD FRML MDRD: 43 ML/MIN/1.73SQ M
GLUCOSE SERPL-MCNC: 81 MG/DL (ref 65–140)
HCT VFR BLD AUTO: 26.7 % (ref 34.8–46.1)
HGB BLD-MCNC: 8.2 G/DL (ref 11.5–15.4)
MCH RBC QN AUTO: 29.3 PG (ref 26.8–34.3)
MCHC RBC AUTO-ENTMCNC: 30.7 G/DL (ref 31.4–37.4)
MCV RBC AUTO: 95 FL (ref 82–98)
PLATELET # BLD AUTO: 452 THOUSANDS/UL (ref 149–390)
PMV BLD AUTO: 9.4 FL (ref 8.9–12.7)
POTASSIUM SERPL-SCNC: 3.3 MMOL/L (ref 3.5–5.3)
PROT SERPL-MCNC: 6 G/DL (ref 6.4–8.4)
RBC # BLD AUTO: 2.8 MILLION/UL (ref 3.81–5.12)
SODIUM SERPL-SCNC: 140 MMOL/L (ref 135–147)
WBC # BLD AUTO: 9.82 THOUSAND/UL (ref 4.31–10.16)

## 2022-08-03 PROCEDURE — 80053 COMPREHEN METABOLIC PANEL: CPT | Performed by: INTERNAL MEDICINE

## 2022-08-03 PROCEDURE — 99232 SBSQ HOSP IP/OBS MODERATE 35: CPT | Performed by: INTERNAL MEDICINE

## 2022-08-03 PROCEDURE — G1004 CDSM NDSC: HCPCS

## 2022-08-03 PROCEDURE — 74181 MRI ABDOMEN W/O CONTRAST: CPT

## 2022-08-03 PROCEDURE — 85027 COMPLETE CBC AUTOMATED: CPT | Performed by: INTERNAL MEDICINE

## 2022-08-03 RX ADMIN — SUCRALFATE 1 G: 1 TABLET ORAL at 06:27

## 2022-08-03 RX ADMIN — MISOPROSTOL 200 MCG: 200 TABLET ORAL at 14:34

## 2022-08-03 RX ADMIN — Medication 125 MG: at 18:15

## 2022-08-03 RX ADMIN — SUCRALFATE 1 G: 1 TABLET ORAL at 12:19

## 2022-08-03 RX ADMIN — MIDODRINE HYDROCHLORIDE 5 MG: 5 TABLET ORAL at 06:27

## 2022-08-03 RX ADMIN — CLONAZEPAM 3 MG: 1 TABLET ORAL at 21:18

## 2022-08-03 RX ADMIN — PIPERACILLIN AND TAZOBACTAM 3.38 G: 36; 4.5 INJECTION, POWDER, FOR SOLUTION INTRAVENOUS at 18:14

## 2022-08-03 RX ADMIN — HEPARIN SODIUM 5000 UNITS: 5000 INJECTION INTRAVENOUS; SUBCUTANEOUS at 14:35

## 2022-08-03 RX ADMIN — SODIUM CHLORIDE 75 ML/HR: 0.9 INJECTION, SOLUTION INTRAVENOUS at 06:28

## 2022-08-03 RX ADMIN — SUCRALFATE 1 G: 1 TABLET ORAL at 21:18

## 2022-08-03 RX ADMIN — LEVOTHYROXINE SODIUM 112 MCG: 112 TABLET ORAL at 06:27

## 2022-08-03 RX ADMIN — PIPERACILLIN AND TAZOBACTAM 3.38 G: 36; 4.5 INJECTION, POWDER, FOR SOLUTION INTRAVENOUS at 00:03

## 2022-08-03 RX ADMIN — MISOPROSTOL 200 MCG: 200 TABLET ORAL at 21:19

## 2022-08-03 RX ADMIN — MIDODRINE HYDROCHLORIDE 5 MG: 5 TABLET ORAL at 18:15

## 2022-08-03 RX ADMIN — PIPERACILLIN AND TAZOBACTAM 3.38 G: 36; 4.5 INJECTION, POWDER, FOR SOLUTION INTRAVENOUS at 06:27

## 2022-08-03 RX ADMIN — PANTOPRAZOLE SODIUM 40 MG: 40 TABLET, DELAYED RELEASE ORAL at 18:14

## 2022-08-03 RX ADMIN — MISOPROSTOL 200 MCG: 200 TABLET ORAL at 10:06

## 2022-08-03 RX ADMIN — Medication 6 MG: at 21:18

## 2022-08-03 RX ADMIN — TRAZODONE HYDROCHLORIDE 150 MG: 100 TABLET ORAL at 21:18

## 2022-08-03 RX ADMIN — MIDODRINE HYDROCHLORIDE 5 MG: 5 TABLET ORAL at 12:19

## 2022-08-03 RX ADMIN — Medication 250 MG: at 10:06

## 2022-08-03 RX ADMIN — Medication 250 MG: at 18:14

## 2022-08-03 RX ADMIN — HEPARIN SODIUM 5000 UNITS: 5000 INJECTION INTRAVENOUS; SUBCUTANEOUS at 06:27

## 2022-08-03 RX ADMIN — SUCRALFATE 1 G: 1 TABLET ORAL at 18:14

## 2022-08-03 RX ADMIN — Medication 125 MG: at 06:27

## 2022-08-03 RX ADMIN — PIPERACILLIN AND TAZOBACTAM 3.38 G: 36; 4.5 INJECTION, POWDER, FOR SOLUTION INTRAVENOUS at 12:19

## 2022-08-03 RX ADMIN — PANTOPRAZOLE SODIUM 40 MG: 40 TABLET, DELAYED RELEASE ORAL at 06:27

## 2022-08-03 RX ADMIN — HEPARIN SODIUM 5000 UNITS: 5000 INJECTION INTRAVENOUS; SUBCUTANEOUS at 21:18

## 2022-08-03 RX ADMIN — MISOPROSTOL 200 MCG: 200 TABLET ORAL at 18:14

## 2022-08-03 NOTE — ASSESSMENT & PLAN NOTE
- presented with septic shock secondary to PICC line related bacteremia  - PICC line has since been removed  - initial improvement on IV cefazolin for treatment of bacteremia, however developed leukocytosis, hypotension, and fever on 07/30  Further workup revealed abnormal right upper quadrant ultrasound, though follow-up MRI negative for cholecystitis  - antibiotics discontinued by ID on 08/05  - has remained afebrile and asymptomatic since discontinuation of antibiotics  - right upper extremity PICC line replaced on 08/06 for reinitiation of TPN

## 2022-08-03 NOTE — PROGRESS NOTES
Progress Note - Infectious Disease   Jerry Powell 76 y o  female MRN: [id number]  Unit/Bed#: [facility] 828-01 Encounter: [id number]      Impression/Plan:  1  Septic shock, present on admission, likely secondary to PICC related bacteremia  [facility] has been removed  Nicki Jeremy is clinically much improved   She is now off pressors and out of ICU   The fever had resolved but now the patient spiking fever once again of unclear etiology   Refuse blood cultures yesterday but now agreeing to them   Unclear significance of the patchy findings on chest x-ray   White count is increased   Consideration for the possibility of C difficile colitis   The procalcitonin level has also increased   LFTs have also increased of unclear significance   Her blood pressure has now become a bit more soft   Stool for C diff positive   White cell count has come down significantly since yesterday without treatment for C diff   Possible aspiration pneumonitis versus pneumonia although the patient has weaned off oxygen support   Procalcitonin level is increased  Right upper quadrant ultrasound with possible acute cholecystitis  Continue Zosyn for now  Follow up repeat blood cultures  Monitor stool output  Check MRI MRCP  Recheck CBC with diff and CMP  Recheck procalcitonin level  Monitor hemodynamics  Additional workup as needed if fever recurs     2  MSSE bacteremia, most likely secondary to PICC line, with patient on TPN  [facility] has been removed   2D echo without vegetation   Repeat blood culture obtained on 07/26 still has growth but this was drawn prior to PICC removal   Repeat blood cultures on 07/28 have no growth thus far      Antibiotics as above  Monitor temperature/WBC  Monitor hemodynamics  Follow up repeat blood cultures  Antibiotics as above      3  NOAH, present on admission, most likely secondary to septic shock   Creatinine is much improved and now stabilized  Antibiotic at full dose    Recheck BMP     4  Small sacral decubitus ulcer, without evidence of cellulitis  Local ulcer care  Monitor      5  Gastric ulcer, stable on PPI      6  Severe protein calorie malnutrition, on outpatient TPN   Given septic shock of PICC origin, risks and benefits of continued TPN should be reassessed      7  Status post distant gastric bypass      8  C difficile infection versus colonization   With a white count coming dramatically down without any treatment and the patient's fever resolving, doubt this is the cause of the patient's recent deterioration  She does have chronic diarrhea that is unchanged   PCR is positive but EIA is negative  Leukocytosis has resolved  Continue oral vancomycin prophylaxis for now  Monitor stool output    Discussed the above management plan with the primary service    Antibiotics:  Zosyn 4  Post PICC removal 8     Subjective:  Patient has no fever, chills, sweats; no nausea, vomiting, still with occasional loose stool; no cough, shortness of breath; no pain  No new symptoms  Objective:  Vitals:  Temp:  [97 6 °F (36 4 °C)-98 4 °F (36 9 °C)] 97 8 °F (36 6 °C)  HR:  [67-77] 67  Resp:  [16-18] 16  BP: ()/(51-59) 97/51  SpO2:  [93 %-96 %] 96 %  Temp (24hrs), Av 9 °F (36 6 °C), Min:97 6 °F (36 4 °C), Max:98 4 °F (36 9 °C)  Current: Temperature: 97 8 °F (36 6 °C)    Physical Exam:   General Appearance:  Alert, interactive, nontoxic, no acute distress  Throat: Oropharynx moist without lesions  Lungs:   Clear to auscultation bilaterally; no wheezes, rhonchi or rales; respirations unlabored   Heart:  RRR; no murmur, rub or gallop   Abdomen:   Soft, non-tender, non-distended, positive bowel sounds  Extremities: No clubbing, cyanosis or edema   Skin: No new rashes or lesions  No draining wounds noted         Labs, Imaging, & Other studies:   All pertinent labs and imaging studies were personally reviewed  Results from last 7 days   Lab Units 22  0620 22  0600 22  0419   WBC Thousand/uL 9 82 11 46* 13 41* HEMOGLOBIN g/dL 8 2* 8 6* 9 2*   PLATELETS Thousands/uL 452* 413* 379     Results from last 7 days   Lab Units 08/03/22  0620 08/02/22  0600 08/01/22  0419 07/31/22  0448   SODIUM mmol/L 140 137 139 136   POTASSIUM mmol/L 3 3* 3 0* 3 1* 3 3*   CHLORIDE mmol/L 114* 112* 112* 107   CO2 mmol/L 18* 20* 21 20*   BUN mg/dL 10 13 16 15   CREATININE mg/dL 1 19 1 34* 1 40* 1 35*   EGFR ml/min/1 73sq m 43 37 35 37   CALCIUM mg/dL 8 2* 8 1* 7 9* 8 0*   AST U/L 25  --  30 47*   ALT U/L 7*  --  6* 17   ALK PHOS U/L 103  --  138* 117*     Results from last 7 days   Lab Units 07/31/22  1458 07/31/22  0449 07/28/22  1502   BLOOD CULTURE   --  No Growth at 48 hrs  No Growth at 48 hrs  No Growth After 5 Days  No Growth After 5 Days     C DIFF TOXIN B BY PCR  Positive*  --   --      Results from last 7 days   Lab Units 08/01/22  0419 07/31/22  0448   PROCALCITONIN ng/ml 3 19* 2 93*

## 2022-08-03 NOTE — ASSESSMENT & PLAN NOTE
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

## 2022-08-03 NOTE — PHYSICAL THERAPY NOTE
Physical Therapy Cancellation Note    Patient's Name: Kamryn Booker       08/03/22 0920   PT Last Visit   PT Visit Date 08/03/22   Note Type   Note Type Cancelled Session   Cancel Reasons Other  (Met with pt at bedside, states she very weak and tired and refused any OOB activity, educated on effects of bedrest, however pt continued to refuse and yelling at therapist "just leave me alone "  Will follow for PT treatment session )     Buster Perez, PT, DPT, GCS

## 2022-08-03 NOTE — RESTORATIVE TECHNICIAN NOTE
Restorative Technician Note      Patient Name: Stacey Wall     Restorative Tech Visit Date: 8/3/2022  Note Type: Mobility  Patient Position Upon Consult: Supine  Patient Position at End of Consult: Supine; All needs within reach    Pt refused mobility services at this time despite ongoing education on the benefits and significant encouragement       Destiny Rosas  DPT, Restorative Technician

## 2022-08-03 NOTE — ASSESSMENT & PLAN NOTE
- MSSA bacteremia secondary to PICC line infection; had previously been on TPN  - PICC line has since been removed  - echocardiogram without vegetation  - repeat blood cultures have cleared    - completed full course of IV antibiotics while inpatient per ID recommendations  - monitor off antibiotics

## 2022-08-03 NOTE — PROGRESS NOTES
[facility]  Progress Note - Laurel Waddell 1945, 76 y o  female MRN: [id number]  [facility] Encounter: [id number]  Primary Care Provider: Anthoney Seip, MD   Date and time admitted to hospital: 7/25/2022 10:04 PM    * Septic shock [facility])  Assessment & Plan  - presented with septic shock secondary to PICC line related bacteremia  - PICC line has since been removed  - initial improvement on IV cefazolin for treatment of bacteremia, however developed leukocytosis, hypotension, and fever on 07/30    - now concern for gallbladder related infection based on abnormal right upper quadrant ultrasound  - will need MRI/MRCP for further delineation  - continue empiric Zosyn  - appreciate ID recommendations      Gram-positive bacteremia  Assessment & Plan  - MSSA bacteremia secondary to PICC line infection; had previously been on TPN  - PICC line has since been removed  - echocardiogram without vegetation  - repeat blood cultures have cleared    - previous on on IV cefazolin based on blood cultures  - broaden to Zosyn and patient developed persistent fever and infectious symptoms  - continue on Zosyn; appreciate ID recommendations    Diarrhea  Assessment & Plan  - Ongoing diarrhea for the past several months  - Malabsorption    - continue C diff prophylaxis with p o  vancomycin    Sacral ulcer ([facility] [address] )  Assessment & Plan  - Sacral decubitus ulcer without evidence of cellulitis  - Continue with local care    Severe protein-calorie malnutrition ([facility] [address] )  Assessment & Plan  Malnutrition Findings:   Adult Malnutrition type: Chronic illness  Adult Degree of Malnutrition: Other severe protein calorie malnutrition  Malnutrition Characteristics: Fat loss, Muscle loss, Inadequate energy                  360 Statement: Severe malnutrition in setting of chronic illness related to malabsorption and poor po intake as evidenced by overt loss of body fat and muscle mass observed (severe orbital wasting , severe trapezius wasting, severe quadricep wasting), and pt reported poor po intakes  Pt very  loquacious  BMI Findings: Body mass index is 19 91 kg/m²  Patient on chronic TPN post remote gastric bypass  Currently TPN on hold  Continue with oral diet  Nutrition recommending resuming TPN once medically appropriate to me daily needs    H/O bariatric surgery - bypass  Assessment & Plan  - 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 at [facility] bariatric follow-up      Anemia  Assessment & Plan  - Anemia of chronic disease  - Status post blood transfusion  and   - cont to monitor    Depression  Assessment & Plan  - Continue home med of trazodone and Klonopin    Hypothyroidism  Assessment & Plan  - Continue levothyroxine      VTE Pharmacologic Prophylaxis:   High Risk (Score >/= 5) - Pharmacological DVT Prophylaxis Ordered: heparin  Sequential Compression Devices Ordered  Patient Centered Rounds: I performed bedside rounds with nursing staff today  Discussions with Specialists or Other Care Team Provider: KEITH SPRING  Education and Discussions with Family / Patient: Updated  (brother) via phone  Time Spent for Care: 30 minutes  More than 50% of total time spent on counseling and coordination of care as described above  Current Length of Stay: 8 day(s)  Current Patient Status: Inpatient   Certification Statement: The patient will continue to require additional inpatient hospital stay due to fever, IV abx's, further diagnostic workup  Discharge Plan: Anticipate discharge in >72 hrs to rehab facility  Code Status: Level 1 - Full Code    Subjective:   Patient seen examined  Following up for the numerous issues listed above  Patient remains afebrile  Feels generally unwell but could not really specify any specific symptoms  Decreased appetite      Objective:     Vitals:   Temp (24hrs), Av 6 °F (36 4 °C), Min:97 3 °F (36 3 °C), Max:97 8 °F (36 6 °C)    Temp:  [97 3 °F (36 3 °C)-97 8 °F (36 6 °C)] 97 3 °F (36 3 °C)  HR:  [67-71] 69  Resp:  [16-18] 18  BP: ()/(51-63) 116/63  SpO2:  [93 %-96 %] 96 %  Body mass index is 19 91 kg/m²  Input and Output Summary (last 24 hours): Intake/Output Summary (Last 24 hours) at 8/3/2022 1630  Last data filed at 8/3/2022 0900  Gross per 24 hour   Intake 2367 5 ml   Output 3 ml   Net 2364 5 ml       PHYSICAL EXAM:    Vitals signs reviewed  Constitutional   Awake and cooperative  Chronically ill-appearing but nontoxic  Head/Neck   Normocephalic  Atraumatic  HEENT   No scleral icterus  EOMI  Heart   Regular rate and rhythm  No murmers  Lungs   Clear to auscultation bilaterally  Respirations unlaboured  Abdomen   Soft  Nondistended  Mild right upper quadrant tenderness to palpation  Skin   Skin color normal  No rashes  Extremities   No deformities  No peripheral edema  Neuro   Alert and oriented  No new deficits  Psych   Mood stable   Affect normal          Additional Data:     Labs:  Results from last 7 days   Lab Units 08/03/22  0620 08/02/22  0600   WBC Thousand/uL 9 82 11 46*   HEMOGLOBIN g/dL 8 2* 8 6*   HEMATOCRIT % 26 7* 26 9*   PLATELETS Thousands/uL 452* 413*   NEUTROS PCT %  --  58   LYMPHS PCT %  --  23   MONOS PCT %  --  7   EOS PCT %  --  11*     Results from last 7 days   Lab Units 08/03/22  0620   SODIUM mmol/L 140   POTASSIUM mmol/L 3 3*   CHLORIDE mmol/L 114*   CO2 mmol/L 18*   BUN mg/dL 10   CREATININE mg/dL 1 19   ANION GAP mmol/L 8   CALCIUM mg/dL 8 2*   ALBUMIN g/dL 1 5*   TOTAL BILIRUBIN mg/dL 0 40   ALK PHOS U/L 103   ALT U/L 7*   AST U/L 25   GLUCOSE RANDOM mg/dL 81         Results from last 7 days   Lab Units 07/31/22  1137   POC GLUCOSE mg/dl 94         Results from last 7 days   Lab Units 08/01/22  0419 07/31/22  0448   PROCALCITONIN ng/ml 3 19* 2 93*       Lines/Drains:  Invasive Devices  Report    Peripheral Intravenous Line  Duration Peripheral IV 08/02/22 Right Wrist 1 day                      Imaging: No pertinent imaging reviewed  Recent Cultures (last 7 days):   Results from last 7 days   Lab Units 07/31/22  1458 07/31/22  0449 07/28/22  1502   BLOOD CULTURE   --  No Growth at 72 hrs  No Growth at 72 hrs  No Growth After 5 Days  No Growth After 5 Days  C DIFF TOXIN B BY PCR  Positive*  --   --        Last 24 Hours Medication List:   Current Facility-Administered Medications   Medication Dose Route Frequency Provider Last Rate    acetaminophen  650 mg Oral Q4H PRN Wilmer Birmingham, DO      calcium carbonate  500 mg Oral Daily PRN TOLU Rowland      clonazePAM  3 mg Oral HS Althchrissy Villatoro, DO      heparin (porcine)  5,000 Units Subcutaneous Q8H [facility] Jv Villatoro, DO      levothyroxine  112 mcg Oral Early Morning Althchrissy Villatoro, DO      loperamide  2 mg Oral TID PRN Wilmer Douglas, DO      melatonin  6 mg Oral HS Jv Villatoro DO      midodrine  5 mg Oral TID  Mario Thurston DO      miSOPROStol  200 mcg Oral 4x Daily (with meals and at bedtime) Jv Villatoro, DO      ondansetron  4 mg Intravenous Q8H PRN Jv Villatoro, DO      pantoprazole  40 mg Oral BID  Mario Thurston DO      piperacillin-tazobactam  3 375 g Intravenous Q6H Aaron Zamora MD 3 375 g (08/03/22 1219)    saccharomyces boulardii  250 mg Oral BID [place], DO      sodium chloride  75 mL/hr Intravenous Continuous [place], DO 75 mL/hr (08/03/22 2052)    sucralfate  1 g Oral 4x Daily (AC & HS) Jv Villatoro, DO      traZODone  150 mg Oral HS Althchrissy Villatoro, DO      vancomycin  125 mg Oral Q12H [facility] Alicia Askew MD          Today, Patient Was Seen By: Alfonzo Hughes DO    **Please Note: This note may have been constructed using a voice recognition system  **

## 2022-08-03 NOTE — CASE MANAGEMENT
Case Management Discharge Planning Note    Patient name Stacey Wall  Location [facility] 828/PPHP 127-98 MRN [id number]  : 1945 Date 8/3/2022       Current Admission Date: 2022  Current Admission Diagnosis:Septic shock [facility])   Patient Active Problem List    Diagnosis Date Noted    Diarrhea 2022    Septic shock ([facility] [address] ) 2022    Sacral ulcer ([facility] [address] ) 2022    Gram-positive bacteremia 2022    Severe protein-calorie malnutrition ([facility] [address] ) 2022    Bilateral leg edema 2022    Ambulatory dysfunction 2022    Acute blood loss anemia 2022    Gastric ulcer 2022    Fever 2022    Anemia 2022    Dyspnea on exertion 2022    Generalized weakness 2022    Hyponatremia 2022    Abnormal CT scan 2022    H/O bariatric surgery - bypass 2022    Cerumen debris on tympanic membrane of right ear 2022    Nasal congestion 2022    Bilateral hearing loss 2022    Fibromyalgia syndrome 2021    Osteopenia of both forearms 2021    Medicare annual wellness visit, subsequent 2021    Shortness of breath 2021    Acute bronchitis 2021    COVID-19 2021    Dysphasia 2020    Epigastric pain 2020    Hypothyroidism 2020    Gastroesophageal reflux disease without esophagitis 2020    Depression 2020    Fibromyalgia, primary 2020    Iron deficiency 2020    Numbness of foot 2020      LOS (days): 8  Geometric Mean LOS (GMLOS) (days): 4 80  Days to GMLOS:-3 6     OBJECTIVE:  Risk of Unplanned Readmission Score: 24 94         Current admission status: Inpatient   Preferred Pharmacy:   [facility] [address] [facility], [address] [place] [address] [address] [facility] [address]  Phone: [phone] Fax: [phone]    Primary Care Provider: Misty Aragon MD    Primary Insurance: [facility] REP  Secondary Insurance:     DISCHARGE DETAILS:                                          Other Referral/Resources/Interventions Provided:  Referral Comments: Left VM for pt's brother Gillian العلي [phone] again to discuss rehab  Noted in prior CM notes from 6/2022 that pt did not have capacity & her brother Gillian العلي is POA  S/w SLIM & pt will be on tpn upon dc  Pt was at [facility] awaiting to see if brother wants pt to return  1550 update: Messages received from some referrals that they are unable to accept pt on TPN  Trying to discuss with pt's brother Gillian العلي if plan will then be for pt to return to [facility] [address]

## 2022-08-03 NOTE — ASSESSMENT & PLAN NOTE
- Ongoing diarrhea for the past several months  - Malabsorption    - continue C diff prophylaxis with p o  vancomycin 3 days post discontinuation of systemic antibiotics through 8/8

## 2022-08-03 NOTE — PLAN OF CARE
Problem: INFECTION - ADULT  Goal: Absence or prevention of progression during hospitalization  Description: INTERVENTIONS:  - Assess and monitor for signs and symptoms of infection  - Monitor lab/diagnostic results  - Monitor all insertion sites, i e  indwelling lines, tubes, and drains  - Monitor endotracheal if appropriate and nasal secretions for changes in amount and color  - [place] appropriate cooling/warming therapies per order  - Administer medications as ordered  - Instruct and encourage patient and family to use good hand hygiene technique  - Identify and instruct in appropriate isolation precautions for identified infection/condition  Outcome: Progressing     Problem: DISCHARGE PLANNING  Goal: Discharge to home or other facility with appropriate resources  Description: INTERVENTIONS:  - Identify barriers to discharge w/patient and caregiver  - Arrange for needed discharge resources and transportation as appropriate  - Identify discharge learning needs (meds, wound care, etc )  - Arrange for interpretive services to assist at discharge as needed  - Refer to Case Management Department for coordinating discharge planning if the patient needs post-hospital services based on physician/advanced practitioner order or complex needs related to functional status, cognitive ability, or social support system  Outcome: Progressing     Problem: Prexisting or High Potential for Compromised Skin Integrity  Goal: Skin integrity is maintained or improved  Description: INTERVENTIONS:  - Identify patients at risk for skin breakdown  - Assess and monitor skin integrity  - Assess and monitor nutrition and hydration status  - Monitor labs   - Assess for incontinence   - Turn and reposition patient  - Assist with mobility/ambulation  - Relieve pressure over bony prominences  - Avoid friction and shearing  - Provide appropriate hygiene as needed including keeping skin clean and dry  - Evaluate need for skin moisturizer/barrier cream  - Collaborate with interdisciplinary team   - Patient/family teaching  - Consider wound care consult   Outcome: Progressing     Problem: Nutrition/Hydration-ADULT  Goal: Nutrient/Hydration intake appropriate for improving, restoring or maintaining nutritional needs  Description: Monitor and assess patient's nutrition/hydration status for malnutrition  Collaborate with interdisciplinary team and initiate plan and interventions as ordered  Monitor patient's weight and dietary intake as ordered or per policy  Utilize nutrition screening tool and intervene as necessary  Determine patient's food preferences and provide high-protein, high-caloric foods as appropriate       INTERVENTIONS:  - Monitor oral intake, urinary output, labs, and treatment plans  - Assess nutrition and hydration status and recommend course of action  - Evaluate amount of meals eaten  - Assist patient with eating if necessary   - Allow adequate time for meals  - Recommend/ encourage appropriate diets, oral nutritional supplements, and vitamin/mineral supplements  - Order, calculate, and assess calorie counts as needed  - Recommend, monitor, and adjust tube feedings and TPN/PPN based on assessed needs  - Assess need for intravenous fluids  - Provide specific nutrition/hydration education as appropriate  - Include patient/family/caregiver in decisions related to nutrition  Outcome: Progressing     Problem: METABOLIC, FLUID AND ELECTROLYTES - ADULT  Goal: Electrolytes maintained within normal limits  Description: INTERVENTIONS:  - Monitor labs and assess patient for signs and symptoms of electrolyte imbalances  - Administer electrolyte replacement as ordered  - Monitor response to electrolyte replacements, including repeat lab results as appropriate  - Instruct patient on fluid and nutrition as appropriate  Outcome: Progressing

## 2022-08-04 ENCOUNTER — APPOINTMENT (INPATIENT)
Dept: RADIOLOGY | Facility: HOSPITAL | Age: 77
DRG: 314 | End: 2022-08-04
Payer: COMMERCIAL

## 2022-08-04 LAB
ANION GAP SERPL CALCULATED.3IONS-SCNC: 8 MMOL/L (ref 4–13)
BUN SERPL-MCNC: 9 MG/DL (ref 5–25)
CALCIUM SERPL-MCNC: 8.2 MG/DL (ref 8.3–10.1)
CHLORIDE SERPL-SCNC: 112 MMOL/L (ref 96–108)
CO2 SERPL-SCNC: 17 MMOL/L (ref 21–32)
CREAT SERPL-MCNC: 1.21 MG/DL (ref 0.6–1.3)
ERYTHROCYTE [DISTWIDTH] IN BLOOD BY AUTOMATED COUNT: 21.4 % (ref 11.6–15.1)
GFR SERPL CREATININE-BSD FRML MDRD: 42 ML/MIN/1.73SQ M
GLUCOSE SERPL-MCNC: 79 MG/DL (ref 65–140)
HCT VFR BLD AUTO: 27.1 % (ref 34.8–46.1)
HGB BLD-MCNC: 8.6 G/DL (ref 11.5–15.4)
MAGNESIUM SERPL-MCNC: 1.7 MG/DL (ref 1.6–2.6)
MCH RBC QN AUTO: 29.9 PG (ref 26.8–34.3)
MCHC RBC AUTO-ENTMCNC: 31.7 G/DL (ref 31.4–37.4)
MCV RBC AUTO: 94 FL (ref 82–98)
PLATELET # BLD AUTO: 447 THOUSANDS/UL (ref 149–390)
PMV BLD AUTO: 9.2 FL (ref 8.9–12.7)
POTASSIUM SERPL-SCNC: 3 MMOL/L (ref 3.5–5.3)
PROCALCITONIN SERPL-MCNC: 0.5 NG/ML
RBC # BLD AUTO: 2.88 MILLION/UL (ref 3.81–5.12)
SODIUM SERPL-SCNC: 137 MMOL/L (ref 135–147)
WBC # BLD AUTO: 13.63 THOUSAND/UL (ref 4.31–10.16)

## 2022-08-04 PROCEDURE — 99232 SBSQ HOSP IP/OBS MODERATE 35: CPT | Performed by: INTERNAL MEDICINE

## 2022-08-04 PROCEDURE — 97530 THERAPEUTIC ACTIVITIES: CPT

## 2022-08-04 PROCEDURE — 70450 CT HEAD/BRAIN W/O DYE: CPT

## 2022-08-04 PROCEDURE — 97116 GAIT TRAINING THERAPY: CPT

## 2022-08-04 PROCEDURE — 80048 BASIC METABOLIC PNL TOTAL CA: CPT | Performed by: INTERNAL MEDICINE

## 2022-08-04 PROCEDURE — 72125 CT NECK SPINE W/O DYE: CPT

## 2022-08-04 PROCEDURE — 85027 COMPLETE CBC AUTOMATED: CPT | Performed by: INTERNAL MEDICINE

## 2022-08-04 PROCEDURE — NC001 PR NO CHARGE: Performed by: PHYSICIAN ASSISTANT

## 2022-08-04 PROCEDURE — 83735 ASSAY OF MAGNESIUM: CPT | Performed by: INTERNAL MEDICINE

## 2022-08-04 PROCEDURE — 97112 NEUROMUSCULAR REEDUCATION: CPT

## 2022-08-04 PROCEDURE — 84145 PROCALCITONIN (PCT): CPT | Performed by: INTERNAL MEDICINE

## 2022-08-04 RX ORDER — POTASSIUM CHLORIDE 20 MEQ/1
20 TABLET, EXTENDED RELEASE ORAL 2 TIMES DAILY
Status: DISCONTINUED | OUTPATIENT
Start: 2022-08-04 | End: 2022-08-05

## 2022-08-04 RX ADMIN — POTASSIUM CHLORIDE 20 MEQ: 1500 TABLET, EXTENDED RELEASE ORAL at 13:14

## 2022-08-04 RX ADMIN — MISOPROSTOL 200 MCG: 200 TABLET ORAL at 21:04

## 2022-08-04 RX ADMIN — PIPERACILLIN AND TAZOBACTAM 3.38 G: 36; 4.5 INJECTION, POWDER, FOR SOLUTION INTRAVENOUS at 00:36

## 2022-08-04 RX ADMIN — PIPERACILLIN AND TAZOBACTAM 3.38 G: 36; 4.5 INJECTION, POWDER, FOR SOLUTION INTRAVENOUS at 06:31

## 2022-08-04 RX ADMIN — MIDODRINE HYDROCHLORIDE 5 MG: 5 TABLET ORAL at 06:32

## 2022-08-04 RX ADMIN — MIDODRINE HYDROCHLORIDE 5 MG: 5 TABLET ORAL at 11:43

## 2022-08-04 RX ADMIN — MISOPROSTOL 200 MCG: 200 TABLET ORAL at 08:50

## 2022-08-04 RX ADMIN — TRAZODONE HYDROCHLORIDE 150 MG: 100 TABLET ORAL at 21:04

## 2022-08-04 RX ADMIN — POTASSIUM CHLORIDE 20 MEQ: 1500 TABLET, EXTENDED RELEASE ORAL at 17:30

## 2022-08-04 RX ADMIN — LEVOTHYROXINE SODIUM 112 MCG: 112 TABLET ORAL at 06:32

## 2022-08-04 RX ADMIN — MIDODRINE HYDROCHLORIDE 5 MG: 5 TABLET ORAL at 17:30

## 2022-08-04 RX ADMIN — PIPERACILLIN AND TAZOBACTAM 3.38 G: 36; 4.5 INJECTION, POWDER, FOR SOLUTION INTRAVENOUS at 11:43

## 2022-08-04 RX ADMIN — MISOPROSTOL 200 MCG: 200 TABLET ORAL at 17:31

## 2022-08-04 RX ADMIN — SUCRALFATE 1 G: 1 TABLET ORAL at 06:32

## 2022-08-04 RX ADMIN — MISOPROSTOL 200 MCG: 200 TABLET ORAL at 11:43

## 2022-08-04 RX ADMIN — HEPARIN SODIUM 5000 UNITS: 5000 INJECTION INTRAVENOUS; SUBCUTANEOUS at 06:32

## 2022-08-04 RX ADMIN — ONDANSETRON 4 MG: 2 INJECTION INTRAMUSCULAR; INTRAVENOUS at 13:14

## 2022-08-04 RX ADMIN — PANTOPRAZOLE SODIUM 40 MG: 40 TABLET, DELAYED RELEASE ORAL at 17:30

## 2022-08-04 RX ADMIN — SUCRALFATE 1 G: 1 TABLET ORAL at 11:43

## 2022-08-04 RX ADMIN — SUCRALFATE 1 G: 1 TABLET ORAL at 21:04

## 2022-08-04 RX ADMIN — PANTOPRAZOLE SODIUM 40 MG: 40 TABLET, DELAYED RELEASE ORAL at 06:32

## 2022-08-04 RX ADMIN — HEPARIN SODIUM 5000 UNITS: 5000 INJECTION INTRAVENOUS; SUBCUTANEOUS at 13:13

## 2022-08-04 RX ADMIN — SUCRALFATE 1 G: 1 TABLET ORAL at 17:30

## 2022-08-04 RX ADMIN — Medication 6 MG: at 21:04

## 2022-08-04 RX ADMIN — Medication 250 MG: at 08:50

## 2022-08-04 RX ADMIN — PIPERACILLIN AND TAZOBACTAM 3.38 G: 36; 4.5 INJECTION, POWDER, FOR SOLUTION INTRAVENOUS at 17:30

## 2022-08-04 RX ADMIN — SODIUM CHLORIDE 75 ML/HR: 0.9 INJECTION, SOLUTION INTRAVENOUS at 06:31

## 2022-08-04 RX ADMIN — CLONAZEPAM 3 MG: 1 TABLET ORAL at 21:04

## 2022-08-04 RX ADMIN — Medication 125 MG: at 06:32

## 2022-08-04 RX ADMIN — Medication 250 MG: at 17:30

## 2022-08-04 RX ADMIN — HEPARIN SODIUM 5000 UNITS: 5000 INJECTION INTRAVENOUS; SUBCUTANEOUS at 21:04

## 2022-08-04 RX ADMIN — Medication 125 MG: at 21:04

## 2022-08-04 RX ADMIN — PIPERACILLIN AND TAZOBACTAM 3.38 G: 36; 4.5 INJECTION, POWDER, FOR SOLUTION INTRAVENOUS at 23:54

## 2022-08-04 NOTE — CODE DOCUMENTATION
Fall positive head strike, unwitnessed cervical collar applied pt assisted back to bed   Ct c spine and head ordered

## 2022-08-04 NOTE — PLAN OF CARE
Problem: INFECTION - ADULT  Goal: Absence or prevention of progression during hospitalization  Description: INTERVENTIONS:  - Assess and monitor for signs and symptoms of infection  - Monitor lab/diagnostic results  - Monitor all insertion sites, i e  indwelling lines, tubes, and drains  - Monitor endotracheal if appropriate and nasal secretions for changes in amount and color  - [place] appropriate cooling/warming therapies per order  - Administer medications as ordered  - Instruct and encourage patient and family to use good hand hygiene technique  - Identify and instruct in appropriate isolation precautions for identified infection/condition  Outcome: Progressing     Problem: DISCHARGE PLANNING  Goal: Discharge to home or other facility with appropriate resources  Description: INTERVENTIONS:  - Identify barriers to discharge w/patient and caregiver  - Arrange for needed discharge resources and transportation as appropriate  - Identify discharge learning needs (meds, wound care, etc )  - Arrange for interpretive services to assist at discharge as needed  - Refer to Case Management Department for coordinating discharge planning if the patient needs post-hospital services based on physician/advanced practitioner order or complex needs related to functional status, cognitive ability, or social support system  Outcome: Progressing     Problem: Prexisting or High Potential for Compromised Skin Integrity  Goal: Skin integrity is maintained or improved  Description: INTERVENTIONS:  - Identify patients at risk for skin breakdown  - Assess and monitor skin integrity  - Assess and monitor nutrition and hydration status  - Monitor labs   - Assess for incontinence   - Turn and reposition patient  - Assist with mobility/ambulation  - Relieve pressure over bony prominences  - Avoid friction and shearing  - Provide appropriate hygiene as needed including keeping skin clean and dry  - Evaluate need for skin moisturizer/barrier cream  - Collaborate with interdisciplinary team   - Patient/family teaching  - Consider wound care consult   Outcome: Progressing     Problem: Nutrition/Hydration-ADULT  Goal: Nutrient/Hydration intake appropriate for improving, restoring or maintaining nutritional needs  Description: Monitor and assess patient's nutrition/hydration status for malnutrition  Collaborate with interdisciplinary team and initiate plan and interventions as ordered  Monitor patient's weight and dietary intake as ordered or per policy  Utilize nutrition screening tool and intervene as necessary  Determine patient's food preferences and provide high-protein, high-caloric foods as appropriate       INTERVENTIONS:  - Monitor oral intake, urinary output, labs, and treatment plans  - Assess nutrition and hydration status and recommend course of action  - Evaluate amount of meals eaten  - Assist patient with eating if necessary   - Allow adequate time for meals  - Recommend/ encourage appropriate diets, oral nutritional supplements, and vitamin/mineral supplements  - Order, calculate, and assess calorie counts as needed  - Recommend, monitor, and adjust tube feedings and TPN/PPN based on assessed needs  - Assess need for intravenous fluids  - Provide specific nutrition/hydration education as appropriate  - Include patient/family/caregiver in decisions related to nutrition  Outcome: Progressing     Problem: METABOLIC, FLUID AND ELECTROLYTES - ADULT  Goal: Electrolytes maintained within normal limits  Description: INTERVENTIONS:  - Monitor labs and assess patient for signs and symptoms of electrolyte imbalances  - Administer electrolyte replacement as ordered  - Monitor response to electrolyte replacements, including repeat lab results as appropriate  - Instruct patient on fluid and nutrition as appropriate  Outcome: Progressing

## 2022-08-04 NOTE — PHYSICAL THERAPY NOTE
Physical Therapy Progress Note     08/04/22 1520   PT Last Visit   PT Visit Date 08/04/22   Note Type   Note Type Treatment   Pain Assessment   Pain Assessment Tool 0-10   Pain Score 6   Pain Location/Orientation Orientation: Left; Location: Hip   Hospital Pain Intervention(s) Repositioned; Ambulation/increased activity; Emotional support   Restrictions/Precautions   Braces or Orthoses (S)  C/S Collar  (Shower collar after her fall earlier )   Other Precautions Bed Alarm; Chair Alarm; Fall Risk;Pain  (Bed alarm active post session )   Subjective   Subjective The patient notes pain since her fall  She reports that she just wants to return to bed and rest    Bed Mobility   Supine to Sit 4  Minimal assistance   Additional items Assist x 1; Increased time required   Sit to Supine 4  Minimal assistance   Additional items Assist x 1; Increased time required   Transfers   Sit to Stand 4  Minimal assistance   Additional items Assist x 1; Increased time required;Verbal cues   Stand to Sit 4  Minimal assistance   Additional items Assist x 1; Increased time required;Verbal cues   Ambulation/Elevation   Gait pattern Excessively slow; Step to;Short stride; Inconsistent beth; Shuffling;Decreased L stance;Decreased foot clearance; Improper Weight shift   Gait Assistance 4  Minimal assist   Additional items Assist x 1;Verbal cues; Tactile cues   Assistive Device Other (Comment)  (Hand-hold assistance )   Distance 3 feet  Balance   Static Sitting Fair -   Dynamic Sitting Poor +   Static Standing Poor +   Ambulatory Poor +   Activity Tolerance   Activity Tolerance Patient limited by fatigue;Patient limited by pain   Nurse Theodore Gilmore RN  Assessment   Prognosis Fair   Problem List Decreased strength;Decreased endurance;Decreased mobility; Impaired balance;Decreased safety awareness;Pain   Assessment The patient returned from a test and was assisted back to the bed   She required increased time in order to sit up as well as to acclimate to the positional change  She had difficulty taking the few steps from the stretcher to the bed as well  Education was provided about the importance of continued mobility, and that it had been eight days since she last was on her feet with therapy  She was dismissive of this, and was then assisted to the bed  She was supine and slid down in the bed, but she declined being boosted up higher in the bed  The alarm was active post session, and she was repositioned several times as well as provided new linens for comfort  Encouraged the patient to work with therapy in order to maintain her strength and reduce future falls  Barriers to Discharge Inaccessible home environment;Decreased caregiver support   Goals   Patient Goals To rest    STG Expiration Date 08/10/22   PT Treatment Day 2   Plan   Treatment/Interventions Functional transfer training;LE strengthening/ROM; Therapeutic exercise; Endurance training;Cognitive reorientation;Patient/family training;Bed mobility;Gait training   Progress Slow progress, multiple refusals   PT Frequency 3-5x/wk   Recommendation   PT Discharge Recommendation Post acute rehabilitation services   Equipment Recommended [facility] Recommended Wheeled walker   AM-PAC Basic Mobility Inpatient   Turning in Bed Without Bedrails 3   Lying on Back to Sitting on Edge of Flat Bed 3   Moving Bed to Chair 3   Standing Up From Chair 3   Walk in Room 3   Climb 3-5 Stairs 1   Basic Mobility Inpatient Raw Score 16   Basic Mobility Standardized Score 38 32   Highest Level Of Mobility   JH-HLM Goal 5: Stand one or more mins   JH-HLM Achieved 4: Move to chair/commode       An AM-PAC Basic Mobility standardized score less than 40 78 suggests the patient may benefit from discharge to post-acute rehab services      Afia Floyd, PTA

## 2022-08-04 NOTE — PLAN OF CARE
Problem: PHYSICAL THERAPY ADULT  Goal: Performs mobility at highest level of function for planned discharge setting  See evaluation for individualized goals  Description: Treatment/Interventions: Functional transfer training, LE strengthening/ROM, Therapeutic exercise, Endurance training, Bed mobility, Gait training, Equipment eval/education, OT  Equipment Recommended: Walker       See flowsheet documentation for full assessment, interventions and recommendations  Outcome: Not Progressing  Note: Prognosis: Fair  Problem List: Decreased strength, Decreased endurance, Decreased mobility, Impaired balance, Decreased safety awareness, Pain  Assessment: The patient returned from a test and was assisted back to the bed  She required increased time in order to sit up as well as to acclimate to the positional change  She had difficulty taking the few steps from the stretcher to the bed as well  Education was provided about the importance of continued mobility, and that it had been eight days since she last was on her feet with therapy  She was dismissive of this, and was then assisted to the bed  She was supine and slid down in the bed, but she declined being boosted up higher in the bed  The alarm was active post session, and she was repositioned several times as well as provided new linens for comfort  Encouraged the patient to work with therapy in order to maintain her strength and reduce future falls  Barriers to Discharge: Inaccessible home environment, Decreased caregiver support     PT Discharge Recommendation: Post acute rehabilitation services    See flowsheet documentation for full assessment

## 2022-08-04 NOTE — NUTRITION
08/04/22 1443   Recommendations/Interventions   Summary Pt's appetite remains poor  Confirmed she is at best eating only bites  Hx of malabsorption with severe malnutrition  Asked about PICC was told the plan is to wait until patient is to return to SNF to place PICC line  Patient has not received adequate nutrition since admission  Please re-consider initiating patient on standard TPN  She is not currently being nourished  Interventions/Recommendations Initiate TPN; Daily wts  Last wt from 7/26      Recommendations to Provider Consider standard TPN

## 2022-08-04 NOTE — RAPID RESPONSE
Rapid Response Note  Talisha Cheek 76 y o  female MRN: [id number]  [facility] Encounter: [id number]    Rapid Response Notification(s):   Response called date/time:  8/4/2022 12:33 PM  Response team arrival date/time:  8/4/2022 12:35 PM  Response end date/time:  8/4/2022 12:45 PM  Level of care:  Medsurg  Rapid response location:  [facility] unit ( [id number])  Primary reason for rapid response call: Fall    Rapid Response Intervention(s):   Airway:  None  Breathing:  None  Circulation:  None  Fluids administered:  None  Medications administered:  None       Assessment:   · High risk fall, unwitnessed with probably head strike  Age >71  Plan:   · STAT CT head and CT c-spine  · C collar in place  · Sr  [facility] aware and will follow-up on CT scans     Rapid Response Outcome:   Transfer:  Remain on floor  Primary service notified of transfer: Yes         Background/Situation:   Talisha Cheek is a 76 y o  female who had a mechanical fall from the bathroom  +headstrike -LOC  Review of Systems   Respiratory: Negative for chest tightness and shortness of breath  Cardiovascular: Negative for palpitations  Gastrointestinal: Negative for nausea and vomiting  All other systems reviewed and are negative  Objective:   Vitals:    08/04/22 1236 08/04/22 1239 08/04/22 1239 08/04/22 1240   BP: 106/57   118/58   BP Location:       Pulse:    74   Resp:    22   Temp:  (!) 97 2 °F (36 2 °C) (!) 97 2 °F (36 2 °C)    TempSrc:       SpO2:    91%   Weight:       Height:         Physical Exam  Vitals reviewed  Constitutional:       General: She is awake  She is not in acute distress  Cardiovascular:      Rate and Rhythm: Normal rate and regular rhythm  Pulmonary:      Effort: Pulmonary effort is normal       Breath sounds: Normal breath sounds  No wheezing, rhonchi or rales  Skin:     General: Skin is warm and dry  Neurological:      General: No focal deficit present        Mental Status: She is alert and oriented to person, place, and time  Mental status is at baseline  Portions of the record may have been created with voice recognition software  Occasional wrong word or "sound a like" substitutions may have occurred due to the inherent limitations of voice recognition software  Read the chart carefully and recognize, using context, where substitutions have occurred      Loreto Hsu PA-C normal...

## 2022-08-04 NOTE — CASE MANAGEMENT
Case Management Discharge Planning Note    Patient name Conner Wyatt  Location [facility] 828/PPHP 723-02 MRN [id number]  : 1945 Date 2022       Current Admission Date: 2022  Current Admission Diagnosis:Septic shock [facility])   Patient Active Problem List    Diagnosis Date Noted    Diarrhea 2022    Septic shock ([facility] [address] ) 2022    Sacral ulcer ([facility] [facility] ) 2022    Gram-positive bacteremia 2022    Severe protein-calorie malnutrition ([facility] [address] [facility] ) 2022    Bilateral leg edema 2022    Ambulatory dysfunction 2022    Acute blood loss anemia 2022    Gastric ulcer 2022    Fever 2022    Anemia 2022    Dyspnea on exertion 2022    Generalized weakness 2022    Hyponatremia 2022    Abnormal CT scan 2022    H/O bariatric surgery - bypass 2022    Cerumen debris on tympanic membrane of right ear 2022    Nasal congestion 2022    Bilateral hearing loss 2022    Fibromyalgia syndrome 2021    Osteopenia of both forearms 2021    Medicare annual wellness visit, subsequent 2021    Shortness of breath 2021    Acute bronchitis 2021    COVID-19 2021    Dysphasia 2020    Epigastric pain 2020    Hypothyroidism 2020    Gastroesophageal reflux disease without esophagitis 2020    Depression 2020    Fibromyalgia, primary 2020    Iron deficiency 2020    Numbness of foot 2020      LOS (days): 9  Geometric Mean LOS (GMLOS) (days): 4 80  Days to GMLOS:-4 7     OBJECTIVE:  Risk of Unplanned Readmission Score: 25 33         Current admission status: Inpatient   Preferred Pharmacy:   Sharyle Finch [address] [facility], [address] [place] [address] [address] [facility] [address]  Phone: [phone] Fax: [phone]    Primary Care Provider: Emelina Patel MD    Primary Insurance: [facility] REP  Secondary Insurance:     DISCHARGE DETAILS:    Spoke with brother [phone]  He is concerned with [facility] due to admit for infection  Questioning TPN  Advised limited facilities accept TPN  States he needs this information to determine facility  PT was LTC at [address], his goal is she would receive STR then DC home  Lives alone  He is unsure why she is not eating    Discussed with MD, need to confirm need for TPN in order to identify DC facility

## 2022-08-04 NOTE — RESTORATIVE TECHNICIAN NOTE
Restorative Technician Note      Patient Name: Laurel Waddell     Restorative Tech Visit Date: 8/4/2022  Note Type: Mobility  Patient Position Upon Consult: Supine  Activity Performed: Ambulated  Assistive Device: Standard walker; Other (Comment) (Ax1; Cervical collar)  Patient Position at End of Consult: Supine; All needs within reach;  Other (comment) (transport stretcher in hallways with transport team member)    Davion ULRICHT, Restorative Technician

## 2022-08-04 NOTE — RESTORATIVE TECHNICIAN NOTE
Restorative Technician Note      Patient Name: Conor Finch     Restorative Tech Visit Date: 8/4/2022  Note Type: Mobility (Responded to rapid response  Pt on floor  Assisted with transfer back to bed )  Patient Position Upon Consult: Supine  Patient Position at End of Consult: Supine; All needs within reach;  Other (comment) (Left in the care of RN and response team)    Zelda Delatorre  DPT, Restorative Technician

## 2022-08-04 NOTE — PROGRESS NOTES
[address]  Progress Note - Jerry Powell 1945, 76 y o  female MRN: [id number]  [facility] Encounter: [id number]  Primary Care Provider: Dalia Fiore MD   Date and time admitted to hospital: 7/25/2022 10:04 PM    * Septic shock [facility])  Assessment & Plan  - presented with septic shock secondary to PICC line related bacteremia  - PICC line has since been removed  - initial improvement on IV cefazolin for treatment of bacteremia, however developed leukocytosis, hypotension, and fever on 07/30    - now concern for gallbladder related infection based on abnormal right upper quadrant ultrasound  - MRI/MRCP of the abdomen nondiagnostic for acute cholecystitis  - continue empiric Zosyn  - recheck procalcitonin  - appreciate ID recommendations      Gram-positive bacteremia  Assessment & Plan  - MSSA bacteremia secondary to PICC line infection; had previously been on TPN  - PICC line has since been removed  - echocardiogram without vegetation  - repeat blood cultures have cleared    - previous on on IV cefazolin based on blood cultures  - broaden to Zosyn and patient developed persistent fever and infectious symptoms  - continue on Zosyn; appreciate ID recommendations    Diarrhea  Assessment & Plan  - Ongoing diarrhea for the past several months  - Malabsorption    - continue C diff prophylaxis with p o  vancomycin    Sacral ulcer ([facility] [address] )  Assessment & Plan  - Sacral decubitus ulcer without evidence of cellulitis  - Continue with local care    Severe protein-calorie malnutrition ([facility] [address] )  Assessment & Plan  Malnutrition Findings:   Adult Malnutrition type: Chronic illness  Adult Degree of Malnutrition: Other severe protein calorie malnutrition  Malnutrition Characteristics: Fat loss, Muscle loss, Inadequate energy                  360 Statement: Severe malnutrition in setting of chronic illness related to malabsorption and poor po intake as evidenced by overt loss of body fat and muscle mass observed (severe orbital wasting , severe trapezius wasting, severe quadricep wasting), and pt reported poor po intakes  Pt very  loquacious  BMI Findings: Body mass index is 19 91 kg/m²  Patient on chronic TPN post remote gastric bypass  Currently TPN on hold  Continue with oral diet  Nutrition recommending resuming TPN once medically appropriate to me daily needs    H/O bariatric surgery - bypass  Assessment & Plan  - 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 at [facility] bariatric follow-up      Anemia  Assessment & Plan  - Anemia of chronic disease  - Status post blood transfusion  and   - cont to monitor    Depression  Assessment & Plan  - Continue home med of trazodone and Klonopin    Hypothyroidism  Assessment & Plan  - Continue levothyroxine      VTE Pharmacologic Prophylaxis:   High Risk (Score >/= 5) - Pharmacological DVT Prophylaxis Ordered: heparin  Sequential Compression Devices Ordered  Patient Centered Rounds: I performed bedside rounds with nursing staff today  Discussions with Specialists or Other Care Team Provider: KEITH SPRING  Education and Discussions with Family / Patient: Updated  (brother) via phone  Time Spent for Care: 30 minutes  More than 50% of total time spent on counseling and coordination of care as described above  Current Length of Stay: 9 day(s)  Current Patient Status: Inpatient   Certification Statement: The patient will continue to require additional inpatient hospital stay due to fever, IV abx's, further diagnostic workup  Discharge Plan: Anticipate discharge in >72 hrs to rehab facility  Code Status: Level 1 - Full Code    Subjective:   Patient seen examined  Following up for the numerous issues listed above  Patient afebrile  No events reported overnight  Denies any chest pain or shortness of breath  Denies any cough    Objective:     Vitals:   Temp (24hrs), Av 7 °F (37 1 °C), Min:97 3 °F (36 3 °C), Max:99 7 °F (37 6 °C)    Temp:  [97 3 °F (36 3 °C)-99 7 °F (37 6 °C)] 99 1 °F (37 3 °C)  HR:  [69-83] 80  Resp:  [16-18] 16  BP: (107-119)/(56-63) 107/56  SpO2:  [91 %-96 %] 91 %  Body mass index is 19 91 kg/m²  Input and Output Summary (last 24 hours): Intake/Output Summary (Last 24 hours) at 8/4/2022 1215  Last data filed at 8/3/2022 1730  Gross per 24 hour   Intake 827 ml   Output --   Net 827 ml       PHYSICAL EXAM:    Vitals signs reviewed  Constitutional   Awake and cooperative  Chronically ill-appearing but nontoxic  Head/Neck   Normocephalic  Atraumatic  HEENT   No scleral icterus  EOMI  Heart   Regular rate and rhythm  No murmers  Lungs   Clear to auscultation bilaterally  Respirations unlaboured  Abdomen   Soft  Nondistended  Mild right upper quadrant tenderness to palpation  Skin   Skin color normal  No rashes  Extremities   No deformities  No peripheral edema  Neuro   Alert and oriented  No new deficits  Psych   Mood stable  Affect normal          Additional Data:     Labs:  Results from last 7 days   Lab Units 08/04/22  0604 08/03/22  0620 08/02/22  0600   WBC Thousand/uL 13 63*   < > 11 46*   HEMOGLOBIN g/dL 8 6*   < > 8 6*   HEMATOCRIT % 27 1*   < > 26 9*   PLATELETS Thousands/uL 447*   < > 413*   NEUTROS PCT %  --   --  58   LYMPHS PCT %  --   --  23   MONOS PCT %  --   --  7   EOS PCT %  --   --  11*    < > = values in this interval not displayed       Results from last 7 days   Lab Units 08/04/22  0604 08/03/22  0620   SODIUM mmol/L 137 140   POTASSIUM mmol/L 3 0* 3 3*   CHLORIDE mmol/L 112* 114*   CO2 mmol/L 17* 18*   BUN mg/dL 9 10   CREATININE mg/dL 1 21 1 19   ANION GAP mmol/L 8 8   CALCIUM mg/dL 8 2* 8 2*   ALBUMIN g/dL  --  1 5*   TOTAL BILIRUBIN mg/dL  --  0 40   ALK PHOS U/L  --  103   ALT U/L  --  7*   AST U/L  --  25   GLUCOSE RANDOM mg/dL 79 81         Results from last 7 days   Lab Units 07/31/22  1137   POC GLUCOSE mg/dl 94 Results from last 7 days   Lab Units 08/04/22  0604 08/01/22  0419 07/31/22  0448   PROCALCITONIN ng/ml 0 50* 3 19* 2 93*       Lines/Drains:  Invasive Devices  Report    Peripheral Intravenous Line  Duration           Peripheral IV 08/03/22 Left;Proximal;Ventral (anterior) Forearm <1 day                      Imaging: No pertinent imaging reviewed  Recent Cultures (last 7 days):   Results from last 7 days   Lab Units 07/31/22  1458 07/31/22  0449 07/28/22  1502   BLOOD CULTURE   --  No Growth After 4 Days  No Growth After 4 Days  No Growth After 5 Days  No Growth After 5 Days     C DIFF TOXIN B BY PCR  Positive*  --   --        Last 24 Hours Medication List:   Current Facility-Administered Medications   Medication Dose Route Frequency Provider Last Rate    acetaminophen  650 mg Oral Q4H PRN Vidal Rangel, DO      calcium carbonate  500 mg Oral Daily PRN TOLU Rowland      clonazePAM  3 mg Oral HS Erin Fleeting, DO      heparin (porcine)  5,000 Units Subcutaneous Q8H [address] Erin Fleeting, DO      levothyroxine  112 mcg Oral Early Morning Erin Fleeting, DO      loperamide  2 mg Oral TID PRN Vidal Rangel, DO      melatonin  6 mg Oral HS Mario Bertrand, DO      midodrine  5 mg Oral TID AC Mario Bertrand, DO      miSOPROStol  200 mcg Oral 4x Daily (with meals and at bedtime) Erin Fleeting, DO      ondansetron  4 mg Intravenous Q8H PRN Erin Fleeting, DO      pantoprazole  40 mg Oral BID AC Mario Thurston, DO      piperacillin-tazobactam  3 375 g Intravenous Q6H Aaron Zamora MD 3 375 g (08/04/22 1143)    potassium chloride  20 mEq Oral BID Caron Hughes,       saccharomyces boulardii  250 mg Oral BID Vidal Rangel, DO      sucralfate  1 g Oral 4x Daily (AC & HS) Erin Fleeting, DO      traZODone  150 mg Oral HS Erin Fleeting, DO      vancomycin  125 mg Oral Q12H [address] Venetta Mohs, MD          Today, Patient Was Seen By: Caron Hughes DO    **Please Note: This note may have been constructed using a voice recognition system  **

## 2022-08-04 NOTE — RESTORATIVE TECHNICIAN NOTE
Restorative Technician Note      Patient Name: Alicia Robbins     Restorative Tech Visit Date: 8/4/2022  Note Type: Mobility  Patient Position Upon Consult: Supine  Patient Position at End of Consult: Supine; All needs within reach; Bed/Chair alarm activated    Pt refused mobility services at this time despite ongoing education on the benefits  Asked to reposition - pt refused       Eliecer Zuniga  DPT, Restorative Technician

## 2022-08-04 NOTE — PROGRESS NOTES
Progress Note - Infectious Disease   Vandana Burgos 76 y o  female MRN: [id number]  Unit/Bed#: [facility] 828-01 Encounter: [id number]      Impression/Plan:  1  Septic shock, present on admission, likely secondary to PICC related bacteremia  [facility] has been removed  Kyree Katy is clinically much improved   She is now off pressors and out of ICU   The fever had resolved but now the patient spiking fever once again of unclear etiology   Refuse blood cultures yesterday but now agreeing to them   Unclear significance of the patchy findings on chest x-ray   White count is increased   Consideration for the possibility of C difficile colitis   The procalcitonin level has also increased   LFTs have also increased of unclear significance   Her blood pressure has now become a bit more soft   Stool for C diff positive   White cell count has come down significantly since yesterday without treatment for C diff   Possible aspiration pneumonitis versus pneumonia although the patient has weaned off oxygen support   Procalcitonin level has now substantially decreased although the white cell count increased  Anastacia Prey upper quadrant ultrasound with possible acute cholecystitis  MRI MRCP limited due to motion but nondiagnostic for acute cholecystitis  Continue Zosyn for now  Follow up repeat blood cultures  Monitor stool output  Recheck CBC with diff and CMP  Recheck procalcitonin level  Monitor hemodynamics  If procalcitonin level continues to decrease in patient is doing clinically well, likely discontinue Zosyn tomorrow     2  MSSE bacteremia, most likely secondary to PICC line, with patient on TPN  [facility] has been removed   2D echo without vegetation   Repeat blood culture obtained on 07/26 still has growth but this was drawn prior to PICC removal   Repeat blood cultures on 07/28 have no growth thus far      Antibiotics as above  Monitor temperature/WBC    Monitor hemodynamics  Follow up repeat blood cultures  Antibiotics as above      3  NOAH, present on admission, most likely secondary to septic shock   Creatinine is much improved and now stabilized  Antibiotic at full dose  Recheck BMP     4  Small sacral decubitus ulcer, without evidence of cellulitis  Local ulcer care  Monitor      5  Gastric ulcer, stable on PPI      6  Severe protein calorie malnutrition, on outpatient TPN   Given septic shock of PICC origin, risks and benefits of continued TPN should be reassessed      7  Status post distant gastric bypass      8  C difficile infection versus colonization   With a white count coming dramatically down without any treatment and the patient's fever resolving, doubt this is the cause of the patient's recent deterioration  She does have chronic diarrhea that is unchanged   PCR is positive but EIA is negative  Leukocytosis has resolved  Continue oral vancomycin prophylaxis for now  Monitor stool output    Discussed the above management plan with the primary service    Antibiotics:  Zosyn 5  Post PICC removal 9  Oral vancomycin prophylaxis    Subjective:  Patient has no fever, chills, sweats; no nausea, vomiting, diarrhea; no cough, shortness of breath; no increased abdominal pain  No new symptoms  Objective:  Vitals:  Temp:  [97 3 °F (36 3 °C)-99 7 °F (37 6 °C)] 99 1 °F (37 3 °C)  HR:  [69-83] 80  Resp:  [16-18] 16  BP: (107-119)/(56-63) 107/56  SpO2:  [91 %-96 %] 91 %  Temp (24hrs), Av 7 °F (37 1 °C), Min:97 3 °F (36 3 °C), Max:99 7 °F (37 6 °C)  Current: Temperature: 99 1 °F (37 3 °C)    Physical Exam:   General Appearance:  Alert, interactive, nontoxic, no acute distress  Throat: Oropharynx moist without lesions  Lungs:   Clear to auscultation bilaterally; no wheezes, rhonchi or rales; respirations unlabored   Heart:  RRR; no murmur, rub or gallop   Abdomen:   Soft, non-tender, non-distended, positive bowel sounds  Extremities: No clubbing, cyanosis or edema   Skin: No new rashes or lesions   No draining wounds noted  Labs, Imaging, & Other studies:   All pertinent labs and imaging studies were personally reviewed  Results from last 7 days   Lab Units 08/04/22  0604 08/03/22  0620 08/02/22  0600   WBC Thousand/uL 13 63* 9 82 11 46*   HEMOGLOBIN g/dL 8 6* 8 2* 8 6*   PLATELETS Thousands/uL 447* 452* 413*     Results from last 7 days   Lab Units 08/04/22  0604 08/03/22  0620 08/02/22  0600 08/01/22  0419 07/31/22  0448   SODIUM mmol/L 137 140 137 139 136   POTASSIUM mmol/L 3 0* 3 3* 3 0* 3 1* 3 3*   CHLORIDE mmol/L 112* 114* 112* 112* 107   CO2 mmol/L 17* 18* 20* 21 20*   BUN mg/dL 9 10 13 16 15   CREATININE mg/dL 1 21 1 19 1 34* 1 40* 1 35*   EGFR ml/min/1 73sq m 42 43 37 35 37   CALCIUM mg/dL 8 2* 8 2* 8 1* 7 9* 8 0*   AST U/L  --  25  --  30 47*   ALT U/L  --  7*  --  6* 17   ALK PHOS U/L  --  103  --  138* 117*     Results from last 7 days   Lab Units 07/31/22  1458 07/31/22  0449 07/28/22  1502   BLOOD CULTURE   --  No Growth at 72 hrs  No Growth at 72 hrs  No Growth After 5 Days  No Growth After 5 Days     C DIFF TOXIN B BY PCR  Positive*  --   --      Results from last 7 days   Lab Units 08/04/22  0604 08/01/22  0419 07/31/22  0448   PROCALCITONIN ng/ml 0 50* 3 19* 2 93*        MRI MRCP-limited due to motion but nondiagnostic for acute cholecystitis    Images personally reviewed by me in PACS

## 2022-08-04 NOTE — PLAN OF CARE
Problem: INFECTION - ADULT  Goal: Absence or prevention of progression during hospitalization  Description: INTERVENTIONS:  - Assess and monitor for signs and symptoms of infection  - Monitor lab/diagnostic results  - Monitor all insertion sites, i e  indwelling lines, tubes, and drains  - Monitor endotracheal if appropriate and nasal secretions for changes in amount and color  - [place] appropriate cooling/warming therapies per order  - Administer medications as ordered  - Instruct and encourage patient and family to use good hand hygiene technique  - Identify and instruct in appropriate isolation precautions for identified infection/condition  Outcome: Progressing     Problem: Nutrition/Hydration-ADULT  Goal: Nutrient/Hydration intake appropriate for improving, restoring or maintaining nutritional needs  Description: Monitor and assess patient's nutrition/hydration status for malnutrition  Collaborate with interdisciplinary team and initiate plan and interventions as ordered  Monitor patient's weight and dietary intake as ordered or per policy  Utilize nutrition screening tool and intervene as necessary  Determine patient's food preferences and provide high-protein, high-caloric foods as appropriate       INTERVENTIONS:  - Monitor oral intake, urinary output, labs, and treatment plans  - Assess nutrition and hydration status and recommend course of action  - Evaluate amount of meals eaten  - Assist patient with eating if necessary   - Allow adequate time for meals  - Recommend/ encourage appropriate diets, oral nutritional supplements, and vitamin/mineral supplements  - Order, calculate, and assess calorie counts as needed  - Recommend, monitor, and adjust tube feedings and TPN/PPN based on assessed needs  - Assess need for intravenous fluids  - Provide specific nutrition/hydration education as appropriate  - Include patient/family/caregiver in decisions related to nutrition  Outcome: Progressing     Problem: METABOLIC, FLUID AND ELECTROLYTES - ADULT  Goal: Electrolytes maintained within normal limits  Description: INTERVENTIONS:  - Monitor labs and assess patient for signs and symptoms of electrolyte imbalances  - Administer electrolyte replacement as ordered  - Monitor response to electrolyte replacements, including repeat lab results as appropriate  - Instruct patient on fluid and nutrition as appropriate  Outcome: Progressing

## 2022-08-04 NOTE — PROGRESS NOTES
Responded to RRT as per protocol  Pt "Irena Cotto" was with medical staff; no family present at bedside or surrounding area  Confirmed with the pt RN the availability of Spiritual Care should it be requested       Thank you!        08/04/22 6513   Clinical Encounter Type   Visited With Patient not available;Health care provider   Crisis Visit   (RRT)   Referral From Other (Comment)  (per protocol)   Referral To
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
## 2022-08-05 LAB
ANION GAP SERPL CALCULATED.3IONS-SCNC: 8 MMOL/L (ref 4–13)
BACTERIA BLD CULT: NORMAL
BACTERIA BLD CULT: NORMAL
BUN SERPL-MCNC: 9 MG/DL (ref 5–25)
CALCIUM SERPL-MCNC: 8.1 MG/DL (ref 8.3–10.1)
CHLORIDE SERPL-SCNC: 114 MMOL/L (ref 96–108)
CO2 SERPL-SCNC: 20 MMOL/L (ref 21–32)
CREAT SERPL-MCNC: 1.17 MG/DL (ref 0.6–1.3)
GFR SERPL CREATININE-BSD FRML MDRD: 44 ML/MIN/1.73SQ M
GLUCOSE SERPL-MCNC: 76 MG/DL (ref 65–140)
POTASSIUM SERPL-SCNC: 3 MMOL/L (ref 3.5–5.3)
PROCALCITONIN SERPL-MCNC: 0.31 NG/ML
SODIUM SERPL-SCNC: 142 MMOL/L (ref 135–147)

## 2022-08-05 PROCEDURE — 99232 SBSQ HOSP IP/OBS MODERATE 35: CPT | Performed by: INTERNAL MEDICINE

## 2022-08-05 PROCEDURE — 84145 PROCALCITONIN (PCT): CPT | Performed by: INTERNAL MEDICINE

## 2022-08-05 PROCEDURE — 97535 SELF CARE MNGMENT TRAINING: CPT

## 2022-08-05 PROCEDURE — 80048 BASIC METABOLIC PNL TOTAL CA: CPT | Performed by: INTERNAL MEDICINE

## 2022-08-05 RX ORDER — POTASSIUM CHLORIDE 20 MEQ/1
40 TABLET, EXTENDED RELEASE ORAL 2 TIMES DAILY
Status: DISCONTINUED | OUTPATIENT
Start: 2022-08-05 | End: 2022-08-07

## 2022-08-05 RX ADMIN — Medication 125 MG: at 18:09

## 2022-08-05 RX ADMIN — MIDODRINE HYDROCHLORIDE 5 MG: 5 TABLET ORAL at 11:46

## 2022-08-05 RX ADMIN — SUCRALFATE 1 G: 1 TABLET ORAL at 05:20

## 2022-08-05 RX ADMIN — Medication 6 MG: at 21:57

## 2022-08-05 RX ADMIN — SUCRALFATE 1 G: 1 TABLET ORAL at 21:57

## 2022-08-05 RX ADMIN — SUCRALFATE 1 G: 1 TABLET ORAL at 11:46

## 2022-08-05 RX ADMIN — TRAZODONE HYDROCHLORIDE 150 MG: 100 TABLET ORAL at 21:57

## 2022-08-05 RX ADMIN — HEPARIN SODIUM 5000 UNITS: 5000 INJECTION INTRAVENOUS; SUBCUTANEOUS at 05:20

## 2022-08-05 RX ADMIN — MISOPROSTOL 200 MCG: 200 TABLET ORAL at 11:46

## 2022-08-05 RX ADMIN — ONDANSETRON 4 MG: 2 INJECTION INTRAMUSCULAR; INTRAVENOUS at 20:01

## 2022-08-05 RX ADMIN — MISOPROSTOL 200 MCG: 200 TABLET ORAL at 08:46

## 2022-08-05 RX ADMIN — POTASSIUM CHLORIDE 40 MEQ: 1500 TABLET, EXTENDED RELEASE ORAL at 18:09

## 2022-08-05 RX ADMIN — POTASSIUM CHLORIDE 20 MEQ: 1500 TABLET, EXTENDED RELEASE ORAL at 08:46

## 2022-08-05 RX ADMIN — PANTOPRAZOLE SODIUM 40 MG: 40 TABLET, DELAYED RELEASE ORAL at 18:09

## 2022-08-05 RX ADMIN — MISOPROSTOL 200 MCG: 200 TABLET ORAL at 18:09

## 2022-08-05 RX ADMIN — Medication 125 MG: at 05:20

## 2022-08-05 RX ADMIN — HEPARIN SODIUM 5000 UNITS: 5000 INJECTION INTRAVENOUS; SUBCUTANEOUS at 14:09

## 2022-08-05 RX ADMIN — CLONAZEPAM 3 MG: 1 TABLET ORAL at 21:57

## 2022-08-05 RX ADMIN — PANTOPRAZOLE SODIUM 40 MG: 40 TABLET, DELAYED RELEASE ORAL at 05:20

## 2022-08-05 RX ADMIN — Medication 250 MG: at 08:46

## 2022-08-05 RX ADMIN — LEVOTHYROXINE SODIUM 112 MCG: 112 TABLET ORAL at 05:20

## 2022-08-05 RX ADMIN — HEPARIN SODIUM 5000 UNITS: 5000 INJECTION INTRAVENOUS; SUBCUTANEOUS at 21:57

## 2022-08-05 RX ADMIN — MIDODRINE HYDROCHLORIDE 5 MG: 5 TABLET ORAL at 05:20

## 2022-08-05 RX ADMIN — PIPERACILLIN AND TAZOBACTAM 3.38 G: 36; 4.5 INJECTION, POWDER, FOR SOLUTION INTRAVENOUS at 05:20

## 2022-08-05 RX ADMIN — SUCRALFATE 1 G: 1 TABLET ORAL at 18:08

## 2022-08-05 RX ADMIN — Medication 250 MG: at 18:09

## 2022-08-05 RX ADMIN — MISOPROSTOL 200 MCG: 200 TABLET ORAL at 21:57

## 2022-08-05 RX ADMIN — MIDODRINE HYDROCHLORIDE 5 MG: 5 TABLET ORAL at 18:08

## 2022-08-05 NOTE — OCCUPATIONAL THERAPY NOTE
Occupational Therapy Treatment Note       08/05/22 1419   OT Last Visit   OT Visit Date 08/05/22   Note Type   Note Type Treatment   Restrictions/Precautions   Weight Bearing Precautions Per Order No   Braces or Orthoses C/S Collar  (Shower collar after her fall earlier )   Other Precautions Fall Risk   Lifestyle   Autonomy Pt was I w/ ADLs, IADLs, and mobility at baseline  No DME use  Reciprocal Relationships Supportive sister, brother in law, and brother (per chart review)   Service to Others retired    Nikolay Doe enjoys gardening and managing her home  Reports being very artistic   Pain Assessment   Pain Assessment Tool 0-10   Pain Score No Pain   ADL   Where Assessed Chair   Grooming Assistance 3  Moderate Assistance   Grooming Deficit Brushing hair  (and washing hair)   Bed Mobility   Supine to Sit 4  Minimal assistance   Additional items Assist x 1   Sit to Supine 5  Supervision   Additional items Assist x 1   Transfers   Sit to Stand 4  Minimal assistance   Additional items Assist x 1   Stand to Sit 4  Minimal assistance   Additional items Assist x 1   Stand pivot 3  Moderate assistance   Additional items Assist x 1   Cognition   Overall Cognitive Status Impaired   Arousal/Participation Alert   Attention Attends with cues to redirect   Orientation Level Oriented X4   Memory Decreased recall of precautions   Following Commands Follows one step commands with increased time or repetition   Activity Tolerance   Activity Tolerance Patient tolerated treatment well   Assessment   Assessment Pt seen for participation Occupational Therapy session with focus on activity tolerance, bed mob, functional transfers/stand pivot transfers to bedside chair for pt engagement in UB/LB self-care tasks  Pt cleared by RN/Myriam for pt participated in OT session  Pt presented supine/HOB raised pt awake/alert and agreeable to participate in therapy following pt identifiers confirmed   Pt reported his therapy goal to wash her hair  Pt required assist for functional transfers/stand pivot to bedside chair 2* decreased strength and activity tolerance  She required assist for grooming task/washing and combing hair 2* pt decreased activity tolerance  Pt will require post acute rehab service to continue to address these above noted pt deficit which currently impair pt ADL and functional mob  Pt return to bed post session,  bed alarm activated and all needs within reach     Plan   Treatment Interventions ADL retraining;Functional transfer training   Goal Expiration Date 08/10/22   OT Treatment Day 1   OT Frequency 3-5x/wk   Recommendation   OT Discharge Recommendation Post acute rehabilitation services   AM-PAC Daily Activity Inpatient   Lower Body Dressing 2   Bathing 2   Toileting 2   Upper Body Dressing 3   Grooming 3   Eating 3   Daily Activity Raw Score 15   Daily Activity Standardized Score (Calc for Raw Score >=11) 34 69   Barthel Index   Feeding 10   Bathing 0   Grooming Score 5   Dressing Score 5   Bladder Score 10   Bowels Score 10   Toilet Use Score 5   Transfers (Bed/Chair) Score 10   Mobility (Level Surface) Score 0   Stairs Score 0   Barthel Index Score 55     Delphia Book  BHAKTA/L

## 2022-08-05 NOTE — PLAN OF CARE
Problem: OCCUPATIONAL THERAPY ADULT  Goal: Performs self-care activities at highest level of function for planned discharge setting  See evaluation for individualized goals  Description: Treatment Interventions: ADL retraining, Functional transfer training, Endurance training, Patient/family training, Cognitive reorientation, Equipment evaluation/education, Compensatory technique education, Energy conservation          See flowsheet documentation for full assessment, interventions and recommendations  Outcome: Progressing  Note: Limitation: Decreased ADL status, Decreased cognition, Decreased endurance, Decreased self-care trans, Decreased high-level ADLs  Prognosis: Good  Assessment: Pt seen for participation Occupational Therapy session with focus on activity tolerance, bed mob, functional transfers/stand pivot transfers to bedside chair for pt engagement in UB/LB self-care tasks  Pt cleared by RN/Myriam for pt participated in OT session  Pt presented supine/HOB raised pt awake/alert and agreeable to participate in therapy following pt identifiers confirmed  Pt reported his therapy goal to wash her hair  Pt required assist for functional transfers/stand pivot to bedside chair 2* decreased strength and activity tolerance  She required assist for grooming task/washing and combing hair 2* pt decreased activity tolerance  Pt will require post acute rehab service to continue to address these above noted pt deficit which currently impair pt ADL and functional mob  Pt return to bed post session,  bed alarm activated and all needs within reach       OT Discharge Recommendation: Post acute rehabilitation services

## 2022-08-05 NOTE — PLAN OF CARE
Problem: INFECTION - ADULT  Goal: Absence or prevention of progression during hospitalization  Description: INTERVENTIONS:  - Assess and monitor for signs and symptoms of infection  - Monitor lab/diagnostic results  - Monitor all insertion sites, i e  indwelling lines, tubes, and drains  - Monitor endotracheal if appropriate and nasal secretions for changes in amount and color  - [place] appropriate cooling/warming therapies per order  - Administer medications as ordered  - Instruct and encourage patient and family to use good hand hygiene technique  - Identify and instruct in appropriate isolation precautions for identified infection/condition  Outcome: Progressing     Problem: DISCHARGE PLANNING  Goal: Discharge to home or other facility with appropriate resources  Description: INTERVENTIONS:  - Identify barriers to discharge w/patient and caregiver  - Arrange for needed discharge resources and transportation as appropriate  - Identify discharge learning needs (meds, wound care, etc )  - Arrange for interpretive services to assist at discharge as needed  - Refer to Case Management Department for coordinating discharge planning if the patient needs post-hospital services based on physician/advanced practitioner order or complex needs related to functional status, cognitive ability, or social support system  Outcome: Progressing     Problem: Prexisting or High Potential for Compromised Skin Integrity  Goal: Skin integrity is maintained or improved  Description: INTERVENTIONS:  - Identify patients at risk for skin breakdown  - Assess and monitor skin integrity  - Assess and monitor nutrition and hydration status  - Monitor labs   - Assess for incontinence   - Turn and reposition patient  - Assist with mobility/ambulation  - Relieve pressure over bony prominences  - Avoid friction and shearing  - Provide appropriate hygiene as needed including keeping skin clean and dry  - Evaluate need for skin moisturizer/barrier cream  - Collaborate with interdisciplinary team   - Patient/family teaching  - Consider wound care consult   Outcome: Progressing     Problem: Nutrition/Hydration-ADULT  Goal: Nutrient/Hydration intake appropriate for improving, restoring or maintaining nutritional needs  Description: Monitor and assess patient's nutrition/hydration status for malnutrition  Collaborate with interdisciplinary team and initiate plan and interventions as ordered  Monitor patient's weight and dietary intake as ordered or per policy  Utilize nutrition screening tool and intervene as necessary  Determine patient's food preferences and provide high-protein, high-caloric foods as appropriate       INTERVENTIONS:  - Monitor oral intake, urinary output, labs, and treatment plans  - Assess nutrition and hydration status and recommend course of action  - Evaluate amount of meals eaten  - Assist patient with eating if necessary   - Allow adequate time for meals  - Recommend/ encourage appropriate diets, oral nutritional supplements, and vitamin/mineral supplements  - Order, calculate, and assess calorie counts as needed  - Recommend, monitor, and adjust tube feedings and TPN/PPN based on assessed needs  - Assess need for intravenous fluids  - Provide specific nutrition/hydration education as appropriate  - Include patient/family/caregiver in decisions related to nutrition  Outcome: Progressing

## 2022-08-05 NOTE — CASE MANAGEMENT
Case Management Discharge Planning Note    Patient name Charley Gatica  [facility] [address] [facility] [id number]  : 1945 Date 2022       Current Admission Date: 2022  Current Admission Diagnosis:Septic shock [facility])   Patient Active Problem List    Diagnosis Date Noted    Fall 2022    Diarrhea 2022    Septic shock ([facility] [address] ) 2022    Sacral ulcer ([facility] [address] ) 2022    Gram-positive bacteremia 2022    Severe protein-calorie malnutrition ([facility] [address] ) 2022    Bilateral leg edema 2022    Ambulatory dysfunction 2022    Acute blood loss anemia 2022    Gastric ulcer 2022    Fever 2022    Anemia 2022    Dyspnea on exertion 2022    Generalized weakness 2022    Hyponatremia 2022    Abnormal CT scan 2022    H/O bariatric surgery - bypass 2022    Cerumen debris on tympanic membrane of right ear 2022    Nasal congestion 2022    Bilateral hearing loss 2022    Fibromyalgia syndrome 2021    Osteopenia of both forearms 2021    Medicare annual wellness visit, subsequent 2021    Shortness of breath 2021    Acute bronchitis 2021    COVID-19 2021    Dysphasia 2020    Epigastric pain 2020    Hypothyroidism 2020    Gastroesophageal reflux disease without esophagitis 2020    Depression 2020    Fibromyalgia, primary 2020    Iron deficiency 2020    Numbness of foot 2020      LOS (days): 10  Geometric Mean LOS (GMLOS) (days): 4 80  Days to GMLOS:-5 7     OBJECTIVE:  Risk of Unplanned Readmission Score: 25 42         Current admission status: Inpatient   Preferred Pharmacy:   [facility] [address] [facility], [address] [place] [address] [address] [facility] [address]  Phone: [phone] Fax: [phone]    Primary Care Provider: Olga Lamas MD    Primary Insurance: [employer] [facility] REP  Secondary Insurance:     DISCHARGE DETAILS:    PT is a bedhold at [facility]  PT will have PICC and TPN on DC  MD spoke with brother who is agreeable to return to [facility] left for brother to return call    TCF brother, not interested in my looking at facility outside of the area that does TPN and pt has family nearby  He is agreeable to return to [facility]

## 2022-08-05 NOTE — PLAN OF CARE
Problem: INFECTION - ADULT  Goal: Absence or prevention of progression during hospitalization  Description: INTERVENTIONS:  - Assess and monitor for signs and symptoms of infection  - Monitor lab/diagnostic results  - Monitor all insertion sites, i e  indwelling lines, tubes, and drains  - Monitor endotracheal if appropriate and nasal secretions for changes in amount and color  - [place] appropriate cooling/warming therapies per order  - Administer medications as ordered  - Instruct and encourage patient and family to use good hand hygiene technique  - Identify and instruct in appropriate isolation precautions for identified infection/condition  Outcome: Progressing     Problem: Nutrition/Hydration-ADULT  Goal: Nutrient/Hydration intake appropriate for improving, restoring or maintaining nutritional needs  Description: Monitor and assess patient's nutrition/hydration status for malnutrition  Collaborate with interdisciplinary team and initiate plan and interventions as ordered  Monitor patient's weight and dietary intake as ordered or per policy  Utilize nutrition screening tool and intervene as necessary  Determine patient's food preferences and provide high-protein, high-caloric foods as appropriate       INTERVENTIONS:  - Monitor oral intake, urinary output, labs, and treatment plans  - Assess nutrition and hydration status and recommend course of action  - Evaluate amount of meals eaten  - Assist patient with eating if necessary   - Allow adequate time for meals  - Recommend/ encourage appropriate diets, oral nutritional supplements, and vitamin/mineral supplements  - Order, calculate, and assess calorie counts as needed  - Recommend, monitor, and adjust tube feedings and TPN/PPN based on assessed needs  - Assess need for intravenous fluids  - Provide specific nutrition/hydration education as appropriate  - Include patient/family/caregiver in decisions related to nutrition  Outcome: Progressing

## 2022-08-05 NOTE — PROGRESS NOTES
Progress Note - Infectious Disease   Yari Marie 76 y o  female MRN: [id number]  Unit/Bed#: [facility] 828-01 Encounter: [id number]      Impression/Plan:  1  Septic shock, present on admission, likely secondary to PICC related bacteremia  [address] has been removed  Zahra Fulling is clinically much improved   She is now off pressors and out of ICU   The fever had resolved but now the patient spiking fever once again of unclear etiology   Refuse blood cultures yesterday but now agreeing to them   Unclear significance of the patchy findings on chest x-ray   White count is increased   Consideration for the possibility of C difficile colitis   The procalcitonin level has also increased   LFTs have also increased of unclear significance   Her blood pressure has now become a bit more soft   Stool for C diff positive   White cell count has come down significantly since yesterday without treatment for C diff   Possible aspiration pneumonitis versus pneumonia although the patient has weaned off oxygen support   Procalcitonin level has now substantially decreased although the white cell count increased  Rosiland Limerick upper quadrant ultrasound with possible acute cholecystitis  MRI MRCP limited due to motion but nondiagnostic for acute cholecystitis  Procalcitonin level has continued to decrease and is now near normal  Discontinue Zosyn  Follow up repeat blood cultures  Monitor stool output  Monitor hemodynamics  Serial exams     2  MSSE bacteremia, most likely secondary to PICC line, with patient on TPN  [address] has been removed   2D echo without vegetation   Repeat blood culture obtained on 07/26 still has growth but this was drawn prior to PICC removal   Repeat blood cultures on 07/28 have no growth thus far      Antibiotics as above  Monitor temperature/WBC    Monitor hemodynamics  Follow up repeat blood cultures  Antibiotics as above      3  NOAH, present on admission, most likely secondary to septic shock   Creatinine is much improved and now stabilized  Antibiotic at full dose  Recheck BMP     4  Small sacral decubitus ulcer, without evidence of cellulitis  Local ulcer care  Monitor      5  Gastric ulcer, stable on PPI      6  Severe protein calorie malnutrition, on outpatient TPN   Given septic shock of PICC origin, risks and benefits of continued TPN should be reassessed      7  Status post distant gastric bypass      8  C difficile infection versus colonization   With a white count coming dramatically down without any treatment and the patient's fever resolving, doubt this is the cause of the patient's recent deterioration  She does have chronic diarrhea that is unchanged   PCR is positive but EIA is negative   Leukocytosis has resolved  Continue oral vancomycin prophylaxis through 2022 to complete 72 hours after completion of the other systemic antibiotics  Monitor stool output    Infectious Disease service will see the patient again 2022 if not discharged  Please call if questions  Antibiotics:  Zosyn 6  Post PICC removal 10  Oral vancomycin prophylaxis    Subjective:  Patient has no fever, chills, sweats; no nausea, vomiting, diarrhea; no cough, shortness of breath; no pain  No new symptoms  Objective:  Vitals:  Temp:  [96 9 °F (36 1 °C)-97 7 °F (36 5 °C)] 97 7 °F (36 5 °C)  HR:  [72-74] 72  Resp:  [17-22] 17  BP: (106-118)/(52-59) 113/59  SpO2:  [91 %-92 %] 92 %  Temp (24hrs), Av 3 °F (36 3 °C), Min:96 9 °F (36 1 °C), Max:97 7 °F (36 5 °C)  Current: Temperature: 97 7 °F (36 5 °C)    Physical Exam:   General Appearance:  Alert, interactive, nontoxic, no acute distress  Throat: Oropharynx moist without lesions  Lungs:   Clear to auscultation bilaterally; no wheezes, rhonchi or rales; respirations unlabored   Heart:  RRR; no murmur, rub or gallop   Abdomen:   Soft, non-tender, non-distended, positive bowel sounds  Extremities: No clubbing, cyanosis or edema   Skin: No new rashes or lesions   No draining wounds noted  Labs, Imaging, & Other studies:   All pertinent labs and imaging studies were personally reviewed  Results from last 7 days   Lab Units 08/04/22  0604 08/03/22  0620 08/02/22  0600   WBC Thousand/uL 13 63* 9 82 11 46*   HEMOGLOBIN g/dL 8 6* 8 2* 8 6*   PLATELETS Thousands/uL 447* 452* 413*     Results from last 7 days   Lab Units 08/05/22  0629 08/04/22  0604 08/03/22  0620 08/02/22  0600 08/01/22  0419 07/31/22  0448   SODIUM mmol/L 142 137 140   < > 139 136   POTASSIUM mmol/L 3 0* 3 0* 3 3*   < > 3 1* 3 3*   CHLORIDE mmol/L 114* 112* 114*   < > 112* 107   CO2 mmol/L 20* 17* 18*   < > 21 20*   BUN mg/dL 9 9 10   < > 16 15   CREATININE mg/dL 1 17 1 21 1 19   < > 1 40* 1 35*   EGFR ml/min/1 73sq m 44 42 43   < > 35 37   CALCIUM mg/dL 8 1* 8 2* 8 2*   < > 7 9* 8 0*   AST U/L  --   --  25  --  30 47*   ALT U/L  --   --  7*  --  6* 17   ALK PHOS U/L  --   --  103  --  138* 117*    < > = values in this interval not displayed  Results from last 7 days   Lab Units 07/31/22  1458 07/31/22  0449   BLOOD CULTURE   --  No Growth After 4 Days  No Growth After 4 Days     C DIFF TOXIN B BY PCR  Positive*  --      Results from last 7 days   Lab Units 08/05/22  0629 08/04/22  0604 08/01/22  0419 07/31/22  0448   PROCALCITONIN ng/ml 0 31* 0 50* 3 19* 2 93*

## 2022-08-05 NOTE — ASSESSMENT & PLAN NOTE
- unwitnessed fall out of bed on 08/04  - trauma workup including CT of the head and cervical spine were unremarkable  - no other clear injuries or trauma    - fall precautions

## 2022-08-05 NOTE — RESTORATIVE TECHNICIAN NOTE
Restorative Technician Note      Patient Name: Dayna Layton     Restorative Tech Visit Date: 8/5/2022  Note Type: Mobility  Patient Position Upon Consult: Supine  Activity Performed: Repositioned  Patient Position at End of Consult: Supine; All needs within reach    Alarm activated    Pt declined mobility services at this time; she states she did not sleep and is too tired  Expressed interest in ambulating at a later time      Amy Ibarra  DPT, Restorative Technician

## 2022-08-05 NOTE — PROGRESS NOTES
[facility]  Progress Note - Peter Lr 1945, 76 y o  female MRN: [id number]  Unit/Bed#: [facility] 828-01 Encounter: [id number]  Primary Care Provider: Kermit Bhat MD   Date and time admitted to hospital: 7/25/2022 10:04 PM    * Septic shock [facility])  Assessment & Plan  - presented with septic shock secondary to PICC line related bacteremia  - PICC line has since been removed  - initial improvement on IV cefazolin for treatment of bacteremia, however developed leukocytosis, hypotension, and fever on 07/30  Further workup revealed abnormal right upper quadrant ultrasound, though follow-up MRI negative for cholecystitis  - antibiotics discontinued by ID on 08/05  - monitor off antibiotics over the weekend  - okay to replace PICC line per ID for re-initiation of TPN      Gram-positive bacteremia  Assessment & Plan  - MSSA bacteremia secondary to PICC line infection; had previously been on TPN  - PICC line has since been removed  - echocardiogram without vegetation  - repeat blood cultures have cleared    - previous on on IV cefazolin based on blood cultures  - broaden to Zosyn and patient developed persistent fever and infectious symptoms  - continue on Zosyn; appreciate ID recommendations    H/O bariatric surgery - bypass  Assessment & Plan  - 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 at [facility]  - has had significant malnutrition and issues with anastomotic ulcerations  - recently seen by bariatric service during last hospitalization at [facility]  They recommended prolonged course of TPN for nutritional optimization with plans for revision of bypass in the future  She has an appointment with them on 08/20  We will replace PICC line and continue TPN until she can be seen by them at follow-up and surgical plans are solidified        Diarrhea  Assessment & Plan  - Ongoing diarrhea for the past several months  - Malabsorption    - continue C diff prophylaxis with p o  vancomycin 3 days post discontinuation of systemic antibiotics through 8/8    [address]  - unwitnessed fall out of bed on 08/04  - trauma workup including CT of the head and cervical spine were unremarkable  - no other clear injuries or trauma    - fall precautions    Sacral ulcer ([facility] [address] )  Assessment & Plan  - Sacral decubitus ulcer without evidence of cellulitis  - Continue with local care    Severe protein-calorie malnutrition ([facility] [address] )  Assessment & Plan  Malnutrition Findings:   Adult Malnutrition type: Chronic illness  Adult Degree of Malnutrition: Other severe protein calorie malnutrition  Malnutrition Characteristics: Fat loss, Muscle loss, Inadequate energy                  360 Statement: Severe malnutrition in setting of chronic illness related to malabsorption and poor po intake as evidenced by overt loss of body fat and muscle mass observed (severe orbital wasting , severe trapezius wasting, severe quadricep wasting), and pt reported poor po intakes  Pt very  loquacious  BMI Findings: Body mass index is 19 91 kg/m²  Patient on chronic TPN post remote gastric bypass  Currently TPN on hold  Continue with oral diet  Nutrition recommending resuming TPN once medically appropriate to me daily needs    Anemia  Assessment & Plan  - Anemia of chronic disease  - Status post blood transfusion 7/26 and 7/31  - cont to monitor    Depression  Assessment & Plan  - Continue home med of trazodone and Klonopin    Hypothyroidism  Assessment & Plan  - Continue levothyroxine      VTE Pharmacologic Prophylaxis:   High Risk (Score >/= 5) - Pharmacological DVT Prophylaxis Ordered: heparin  Sequential Compression Devices Ordered  Patient Centered Rounds: I performed bedside rounds with nursing staff today  Discussions with Specialists or Other Care Team Provider: KEITH SPRING  Education and Discussions with Family / Patient: Updated  (brother) via phone      Time Spent for Care: 30 minutes  More than 50% of total time spent on counseling and coordination of care as described above  Current Length of Stay: 10 day(s)  Current Patient Status: Inpatient   Certification Statement: The patient will continue to require additional inpatient hospital stay due to fever, IV abx's, further diagnostic workup  Discharge Plan: Anticipate discharge in >72 hrs to rehab facility  Code Status: Level 1 - Full Code    Subjective:   Patient seen examined  Following up for the numerous issues listed above  Had a fall yesterday out of bed but no trauma and no findings on CT of the head or neck  Feeling well today  Completely alert and oriented  Discussed nutrition and need for ongoing TPN  Patient was agreeable as she wants to be able to have surgery and her bypass revised  No other new complaints  No events reported overnight  Objective:     Vitals:   Temp (24hrs), Av 3 °F (36 3 °C), Min:96 9 °F (36 1 °C), Max:97 7 °F (36 5 °C)    Temp:  [96 9 °F (36 1 °C)-97 7 °F (36 5 °C)] 97 7 °F (36 5 °C)  HR:  [72-74] 72  Resp:  [17-22] 17  BP: (106-118)/(52-59) 113/59  SpO2:  [91 %-92 %] 92 %  Body mass index is 23 17 kg/m²  Input and Output Summary (last 24 hours):   No intake or output data in the 24 hours ending 22 1133    PHYSICAL EXAM:    Vitals signs reviewed  Constitutional   Awake and cooperative  Chronically ill-appearing but nontoxic  Head/Neck   Normocephalic  Atraumatic  HEENT   No scleral icterus  EOMI  Heart   Regular rate and rhythm  No murmers  Lungs   Clear to auscultation bilaterally  Respirations unlaboured  Abdomen   Soft  Nondistended  Mild right upper quadrant tenderness to palpation  Skin   Skin color normal  No rashes  Extremities   No deformities  No peripheral edema  Neuro   Alert and oriented  No new deficits  Psych   Mood stable   Affect normal          Additional Data:     Labs:  Results from last 7 days   Lab Units 22  0604 08/03/22  0620 08/02/22  0600   WBC Thousand/uL 13 63*   < > 11 46*   HEMOGLOBIN g/dL 8 6*   < > 8 6*   HEMATOCRIT % 27 1*   < > 26 9*   PLATELETS Thousands/uL 447*   < > 413*   NEUTROS PCT %  --   --  58   LYMPHS PCT %  --   --  23   MONOS PCT %  --   --  7   EOS PCT %  --   --  11*    < > = values in this interval not displayed  Results from last 7 days   Lab Units 08/05/22  0629 08/04/22  0604 08/03/22  0620   SODIUM mmol/L 142   < > 140   POTASSIUM mmol/L 3 0*   < > 3 3*   CHLORIDE mmol/L 114*   < > 114*   CO2 mmol/L 20*   < > 18*   BUN mg/dL 9   < > 10   CREATININE mg/dL 1 17   < > 1 19   ANION GAP mmol/L 8   < > 8   CALCIUM mg/dL 8 1*   < > 8 2*   ALBUMIN g/dL  --   --  1 5*   TOTAL BILIRUBIN mg/dL  --   --  0 40   ALK PHOS U/L  --   --  103   ALT U/L  --   --  7*   AST U/L  --   --  25   GLUCOSE RANDOM mg/dL 76   < > 81    < > = values in this interval not displayed  Results from last 7 days   Lab Units 07/31/22  1137   POC GLUCOSE mg/dl 94         Results from last 7 days   Lab Units 08/05/22  0629 08/04/22  0604 08/01/22  0419 07/31/22  0448   PROCALCITONIN ng/ml 0 31* 0 50* 3 19* 2 93*       Lines/Drains:  Invasive Devices  Report    Peripheral Intravenous Line  Duration           Peripheral IV 08/03/22 Left;Proximal;Ventral (anterior) Forearm 1 day                      Imaging: No pertinent imaging reviewed  Recent Cultures (last 7 days):   Results from last 7 days   Lab Units 07/31/22  1458 07/31/22  0449   BLOOD CULTURE   --  No Growth After 4 Days  No Growth After 4 Days     C DIFF TOXIN B BY PCR  Positive*  --        Last 24 Hours Medication List:   Current Facility-Administered Medications   Medication Dose Route Frequency Provider Last Rate    acetaminophen  650 mg Oral Q4H PRN Samantha Jimenez DO      calcium carbonate  500 mg Oral Daily PRN TOLU Rowland      clonazePAM  3 mg Oral HS Mario Thurston,       heparin (porcine)  5,000 Units Subcutaneous Q8H [facility] [place] DO Bibiana  levothyroxine  112 mcg Oral Early Morning Sherin Fossa, DO      loperamide  2 mg Oral TID PRN Renard Repress, DO      melatonin  6 mg Oral HS Sherin Fossa, DO      midodrine  5 mg Oral TID AC Mario Bertrand, DO      miSOPROStol  200 mcg Oral 4x Daily (with meals and at bedtime) Sherin Fossa, DO      ondansetron  4 mg Intravenous Q8H PRN Sherin Fossa, DO      pantoprazole  40 mg Oral BID AC Mario Bertrand, DO      potassium chloride  40 mEq Oral BID Darylene Maize Starsinic, DO      saccharomyces boulardii  250 mg Oral BID Renard Repress, DO      sucralfate  1 g Oral 4x Daily (AC & HS) Sherin Fossa, DO      traZODone  150 mg Oral HS Sherin Fossa, DO      vancomycin  125 mg Oral Q12H [address] Mirlande Pace DO          Today, Patient Was Seen By: Darylene Maize Starsinic, DO    **Please Note: This note may have been constructed using a voice recognition system  **

## 2022-08-05 NOTE — PROGRESS NOTES
supported the slow moving pt who lay in bed and presented as tired, weak, and reserved   assisted the pt with her blankets and prayed with her  08/04/22 [address] [place] [address] Involvement Patient active with [employer]  (Periodic attendance)   Spiritual Beliefs/Perceptions   Concept of God Accepting   God's Role in Disease Natural   Relationship with God Close   Support Systems Friend; Family members   Stress Factors   Patient Stress Factors Health changes; Exhausted   Coping Responses   Patient Coping Accepting;Depression   Plan of Care   Avril Brooks will Follow Yes   Comments   ( visit upon request)   Assessment Completed by: Unit visit

## 2022-08-05 NOTE — RESTORATIVE TECHNICIAN NOTE
Restorative Technician Note      Patient Name: Whitman Shone     Restorative Tech Visit Date: 8/5/2022  Note Type: Mobility  Patient Position Upon Consult: Supine  Activity Performed: Ambulated  Assistive Device: Standard walker; Other (Comment) (Ax1)  Patient Position at End of Consult: Bedside chair;  All needs within reach; Bed/Chair alarm activated    Carlito ULRICHT, Restorative Technician

## 2022-08-06 PROCEDURE — 36569 INSJ PICC 5 YR+ W/O IMAGING: CPT

## 2022-08-06 PROCEDURE — C1751 CATH, INF, PER/CENT/MIDLINE: HCPCS

## 2022-08-06 PROCEDURE — 02HV33Z INSERTION OF INFUSION DEVICE INTO SUPERIOR VENA CAVA, PERCUTANEOUS APPROACH: ICD-10-PCS | Performed by: INTERNAL MEDICINE

## 2022-08-06 PROCEDURE — 99232 SBSQ HOSP IP/OBS MODERATE 35: CPT | Performed by: INTERNAL MEDICINE

## 2022-08-06 RX ORDER — POTASSIUM CHLORIDE 14.9 MG/ML
20 INJECTION INTRAVENOUS ONCE
Status: COMPLETED | OUTPATIENT
Start: 2022-08-06 | End: 2022-08-06

## 2022-08-06 RX ADMIN — Medication 250 MG: at 09:45

## 2022-08-06 RX ADMIN — Medication 125 MG: at 05:22

## 2022-08-06 RX ADMIN — Medication 250 MG: at 17:36

## 2022-08-06 RX ADMIN — SUCRALFATE 1 G: 1 TABLET ORAL at 22:33

## 2022-08-06 RX ADMIN — MISOPROSTOL 200 MCG: 200 TABLET ORAL at 09:44

## 2022-08-06 RX ADMIN — MIDODRINE HYDROCHLORIDE 5 MG: 5 TABLET ORAL at 17:35

## 2022-08-06 RX ADMIN — HEPARIN SODIUM 5000 UNITS: 5000 INJECTION INTRAVENOUS; SUBCUTANEOUS at 22:33

## 2022-08-06 RX ADMIN — MIDODRINE HYDROCHLORIDE 5 MG: 5 TABLET ORAL at 12:07

## 2022-08-06 RX ADMIN — CLONAZEPAM 3 MG: 1 TABLET ORAL at 22:33

## 2022-08-06 RX ADMIN — MISOPROSTOL 200 MCG: 200 TABLET ORAL at 17:35

## 2022-08-06 RX ADMIN — MIDODRINE HYDROCHLORIDE 5 MG: 5 TABLET ORAL at 09:44

## 2022-08-06 RX ADMIN — POTASSIUM CHLORIDE 20 MEQ: 14.9 INJECTION, SOLUTION INTRAVENOUS at 09:44

## 2022-08-06 RX ADMIN — Medication 125 MG: at 17:35

## 2022-08-06 RX ADMIN — PANTOPRAZOLE SODIUM 40 MG: 40 TABLET, DELAYED RELEASE ORAL at 17:35

## 2022-08-06 RX ADMIN — MISOPROSTOL 200 MCG: 200 TABLET ORAL at 22:33

## 2022-08-06 RX ADMIN — SUCRALFATE 1 G: 1 TABLET ORAL at 08:45

## 2022-08-06 RX ADMIN — Medication 6 MG: at 22:33

## 2022-08-06 RX ADMIN — HEPARIN SODIUM 5000 UNITS: 5000 INJECTION INTRAVENOUS; SUBCUTANEOUS at 05:22

## 2022-08-06 RX ADMIN — SUCRALFATE 1 G: 1 TABLET ORAL at 17:35

## 2022-08-06 RX ADMIN — MISOPROSTOL 200 MCG: 200 TABLET ORAL at 12:07

## 2022-08-06 RX ADMIN — POTASSIUM CHLORIDE 40 MEQ: 1500 TABLET, EXTENDED RELEASE ORAL at 17:35

## 2022-08-06 RX ADMIN — HEPARIN SODIUM 5000 UNITS: 5000 INJECTION INTRAVENOUS; SUBCUTANEOUS at 14:01

## 2022-08-06 RX ADMIN — TRAZODONE HYDROCHLORIDE 150 MG: 100 TABLET ORAL at 22:33

## 2022-08-06 RX ADMIN — SUCRALFATE 1 G: 1 TABLET ORAL at 12:07

## 2022-08-06 RX ADMIN — PANTOPRAZOLE SODIUM 40 MG: 40 TABLET, DELAYED RELEASE ORAL at 05:22

## 2022-08-06 RX ADMIN — POTASSIUM CHLORIDE 40 MEQ: 1500 TABLET, EXTENDED RELEASE ORAL at 09:44

## 2022-08-06 RX ADMIN — LEVOTHYROXINE SODIUM 112 MCG: 112 TABLET ORAL at 05:23

## 2022-08-06 NOTE — PROCEDURES
Insert PICC line    Date/Time: 8/6/2022 9:18 AM  Performed by: Zahra Prieto RN  Authorized by: Sade Hayden DO     Patient location:  Bedside  Other Assisting Provider: Yes (comment)    Consent:     Consent obtained:  Written (physician obtained consent)    Consent given by:  Patient    Procedural risks discussed: MD instructed  Universal protocol:     Procedure explained and questions answered to patient or proxy's satisfaction: yes      Relevant documents present and verified: yes      Test results available and properly labeled: yes      Radiology Images displayed and confirmed  If images not available, report reviewed: yes      Required blood products, implants, devices, and special equipment available: yes      Site/side marked: yes      Immediately prior to procedure, a time out was called: yes      Patient identity confirmed:  Verbally with patient and arm band  Pre-procedure details:     Hand hygiene: Hand hygiene performed prior to insertion      Sterile barrier technique: All elements of maximal sterile technique followed      Skin preparation:  ChloraPrep    Skin preparation agent: Skin preparation agent completely dried prior to procedure    Indications:     PICC line indications: total parenteral nutrition      PICC line indications comment:  TPN sticker applied to purple port  Sedation:     Sedation type: Other (comment)  Anesthesia (see MAR for exact dosages):      Anesthesia method:  Local infiltration    Local anesthetic:  Lidocaine 1% w/o epi (2 ml)  Procedure details:     Location:  Brachial    Vessel type: vein      Laterality:  Right    Approach: percutaneous technique used      Patient position:  Flat    Procedural supplies:  Double lumen    Catheter size:  5 Fr    Landmarks identified: yes      Ultrasound guidance: yes      Ultrasound image availability:  Not saved    Sterile ultrasound techniques: Sterile gel and sterile probe covers were used      Number of attempts:  2 Successful placement: yes      Vessel of catheter tip end:  Sherlock 3CG confirmed (OK to use, reported to RN)    Total catheter length (cm):  41    Catheter out on skin (cm):  1    Max flow rate:  999 ml / hr    Arm circumference:  25  Post-procedure details:     Post-procedure:  Dressing applied and securement device placed    Assessment:  Blood return through all ports and free fluid flow    Post-procedure complications: none      Patient tolerance of procedure:   Tolerated well, no immediate complications    Observer: Yes      Observer name:  Blanca Parnell [employer]

## 2022-08-06 NOTE — PROGRESS NOTES
[address] [facility]  Progress Note - Dayna Layton 1945, 76 y o  female MRN: [id number]  Unit/Bed#: PPHP 828-01 Encounter: [id number]  Primary Care Provider: Damon Albert MD   Date and time admitted to hospital: 7/25/2022 10:04 PM    * Septic shock [facility])  Assessment & Plan  - presented with septic shock secondary to PICC line related bacteremia  - PICC line has since been removed  - initial improvement on IV cefazolin for treatment of bacteremia, however developed leukocytosis, hypotension, and fever on 07/30  Further workup revealed abnormal right upper quadrant ultrasound, though follow-up MRI negative for cholecystitis  - antibiotics discontinued by ID on 08/05  - monitor off antibiotics over the weekend  - right upper extremity PICC line replaced on 08/06 for reinitiation of TPN  Gram-positive bacteremia  Assessment & Plan  - MSSA bacteremia secondary to PICC line infection; had previously been on TPN  - PICC line has since been removed  - echocardiogram without vegetation  - repeat blood cultures have cleared    - completed full course of IV antibiotics while inpatient per ID recommendations  - monitor off antibiotics    H/O bariatric surgery - bypass  Assessment & Plan  - 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 at [facility]  - has had significant malnutrition and issues with anastomotic ulcerations  - recently seen by bariatric service during last hospitalization at [facility]  They recommended prolonged course of TPN for nutritional optimization with plans for revision of bypass in the future  She has an appointment with them on 08/20  We will replace PICC line and continue TPN until she can be seen by them at follow-up and surgical plans are solidified        Diarrhea  Assessment & Plan  - Ongoing diarrhea for the past several months  - Malabsorption    - continue C diff prophylaxis with p o  vancomycin 3 days post discontinuation of systemic antibiotics through 8/8    Fall  Assessment & Plan  - unwitnessed fall out of bed on 08/04  - trauma workup including CT of the head and cervical spine were unremarkable  - no other clear injuries or trauma    - fall precautions    Sacral ulcer ([facility] [address] )  Assessment & Plan  - Sacral decubitus ulcer without evidence of cellulitis  - Continue with local care    Severe protein-calorie malnutrition ([facility] [address] )  Assessment & Plan  Malnutrition Findings:   Adult Malnutrition type: Chronic illness  Adult Degree of Malnutrition: Other severe protein calorie malnutrition  Malnutrition Characteristics: Fat loss, Muscle loss, Inadequate energy                  360 Statement: Severe malnutrition in setting of chronic illness related to malabsorption and poor po intake as evidenced by overt loss of body fat and muscle mass observed (severe orbital wasting , severe trapezius wasting, severe quadricep wasting), and pt reported poor po intakes  Pt very  loquacious  BMI Findings: Body mass index is 19 91 kg/m²  Patient on chronic TPN post remote gastric bypass  Currently TPN on hold  Continue with oral diet  Nutrition recommending resuming TPN once medically appropriate to me daily needs    Anemia  Assessment & Plan  - Anemia of chronic disease  - Status post blood transfusion 7/26 and 7/31  - cont to monitor    Depression  Assessment & Plan  - Continue home med of trazodone and Klonopin    Hypothyroidism  Assessment & Plan  - Continue levothyroxine      VTE Pharmacologic Prophylaxis:   High Risk (Score >/= 5) - Pharmacological DVT Prophylaxis Ordered: heparin  Sequential Compression Devices Ordered  Patient Centered Rounds: I performed bedside rounds with nursing staff today  Discussions with Specialists or Other Care Team Provider: CM      Education and Discussions with Family / Patient: Updated  (brother) via phone  Time Spent for Care: 30 minutes   More than 50% of total time spent on counseling and coordination of care as described above  Current Length of Stay: 11 day(s)  Current Patient Status: Inpatient   Certification Statement: The patient will continue to require additional inpatient hospital stay due to fever, IV abx's, further diagnostic workup  Discharge Plan: Anticipate discharge in >72 hrs to rehab facility  Code Status: Level 1 - Full Code    Subjective:   Patient seen and examined  Following up for the numerous issues listed above  No new complaints  PICC line in today  Afebrile  Objective:     Vitals:   Temp (24hrs), Av °F (36 7 °C), Min:97 6 °F (36 4 °C), Max:98 4 °F (36 9 °C)    Temp:  [97 6 °F (36 4 °C)-98 4 °F (36 9 °C)] 98 4 °F (36 9 °C)  HR:  [72-84] 75  Resp:  [16] 16  BP: (106-119)/(58-72) 108/59  SpO2:  [88 %-95 %] 88 %  Body mass index is 23 17 kg/m²  Input and Output Summary (last 24 hours): Intake/Output Summary (Last 24 hours) at 2022 1238  Last data filed at 2022 0700  Gross per 24 hour   Intake 120 ml   Output --   Net 120 ml       PHYSICAL EXAM:    Vitals signs reviewed  Constitutional   Awake and cooperative  Chronically ill-appearing but nontoxic  Head/Neck   Normocephalic  Atraumatic  HEENT   No scleral icterus  EOMI  Heart   Regular rate and rhythm  No murmers  Lungs   Clear to auscultation bilaterally  Respirations unlaboured  Abdomen   Soft  Nondistended  Mild right upper quadrant tenderness to palpation  Skin   Skin color normal  No rashes  Extremities   No deformities  No peripheral edema  Neuro   Alert and oriented  No new deficits  Psych   Mood stable   Affect normal          Additional Data:     Labs:  Results from last 7 days   Lab Units 22  0604 22  0620 22  0600   WBC Thousand/uL 13 63*   < > 11 46*   HEMOGLOBIN g/dL 8 6*   < > 8 6*   HEMATOCRIT % 27 1*   < > 26 9*   PLATELETS Thousands/uL 447*   < > 413*   NEUTROS PCT %  --   --  58   LYMPHS PCT %  --   --  23   MONOS PCT %  --   --  7   EOS PCT % --   --  11*    < > = values in this interval not displayed  Results from last 7 days   Lab Units 08/05/22  0629 08/04/22  0604 08/03/22  0620   SODIUM mmol/L 142   < > 140   POTASSIUM mmol/L 3 0*   < > 3 3*   CHLORIDE mmol/L 114*   < > 114*   CO2 mmol/L 20*   < > 18*   BUN mg/dL 9   < > 10   CREATININE mg/dL 1 17   < > 1 19   ANION GAP mmol/L 8   < > 8   CALCIUM mg/dL 8 1*   < > 8 2*   ALBUMIN g/dL  --   --  1 5*   TOTAL BILIRUBIN mg/dL  --   --  0 40   ALK PHOS U/L  --   --  103   ALT U/L  --   --  7*   AST U/L  --   --  25   GLUCOSE RANDOM mg/dL 76   < > 81    < > = values in this interval not displayed  Results from last 7 days   Lab Units 07/31/22  1137   POC GLUCOSE mg/dl 94         Results from last 7 days   Lab Units 08/05/22  0629 08/04/22  0604 08/01/22  0419 07/31/22  0448   PROCALCITONIN ng/ml 0 31* 0 50* 3 19* 2 93*       Lines/Drains:  Invasive Devices  Report    Peripherally Inserted Central Catheter Line  Duration           PICC Line 08/06/22 Right Brachial <1 day          Peripheral Intravenous Line  Duration           Peripheral IV 08/03/22 Left;Proximal;Ventral (anterior) Forearm 2 days                      Imaging: No pertinent imaging reviewed  Recent Cultures (last 7 days):   Results from last 7 days   Lab Units 07/31/22  1458 07/31/22  0449   BLOOD CULTURE   --  No Growth After 5 Days  No Growth After 5 Days     C DIFF TOXIN B BY PCR  Positive*  --        Last 24 Hours Medication List:   Current Facility-Administered Medications   Medication Dose Route Frequency Provider Last Rate    acetaminophen  650 mg Oral Q4H PRN Rogelio Trevino DO      calcium carbonate  500 mg Oral Daily PRN TOLU Rowland      clonazePAM  3 mg Oral HS Tomma Huddle, DO      heparin (porcine)  5,000 Units Subcutaneous [facility] Tomma Huddle, DO      levothyroxine  112 mcg Oral Early Morning Tomma Huddle, DO      loperamide  2 mg Oral TID PRN Rogelio Trevino DO      melatonin  6 mg Oral HS Tomma Huddle, DO      midodrine  5 mg Oral TID AC Mario Bertrand, DO      miSOPROStol  200 mcg Oral 4x Daily (with meals and at bedtime) Anusha Redmond,       ondansetron  4 mg Intravenous Q8H PRN Anusha Redmond, DO      pantoprazole  40 mg Oral BID AC Marioricky Thurston, DO      potassium chloride  40 mEq Oral BID Kanwal Hughes, DO      saccharomyces boulardii  250 mg Oral BID Esteban Villegas, DO      sucralfate  1 g Oral 4x Daily (AC & HS) Anusha Redmond,       traZODone  150 mg Oral HS Anusha Redmond,       vancomycin  125 mg Oral Q12H [address] Orvil [place], DO          Today, Patient Was Seen By: Kanwal Hughes DO    **Please Note: This note may have been constructed using a voice recognition system  **

## 2022-08-07 LAB
ALBUMIN SERPL BCP-MCNC: 1.7 G/DL (ref 3.5–5)
ALP SERPL-CCNC: 89 U/L (ref 46–116)
ALT SERPL W P-5'-P-CCNC: 7 U/L (ref 12–78)
ANION GAP SERPL CALCULATED.3IONS-SCNC: 3 MMOL/L (ref 4–13)
AST SERPL W P-5'-P-CCNC: 16 U/L (ref 5–45)
BILIRUB DIRECT SERPL-MCNC: 0.12 MG/DL (ref 0–0.2)
BILIRUB SERPL-MCNC: 0.27 MG/DL (ref 0.2–1)
BUN SERPL-MCNC: 9 MG/DL (ref 5–25)
CALCIUM SERPL-MCNC: 8.6 MG/DL (ref 8.3–10.1)
CHLORIDE SERPL-SCNC: 109 MMOL/L (ref 96–108)
CO2 SERPL-SCNC: 24 MMOL/L (ref 21–32)
CREAT SERPL-MCNC: 1.1 MG/DL (ref 0.6–1.3)
GFR SERPL CREATININE-BSD FRML MDRD: 47 ML/MIN/1.73SQ M
GLUCOSE SERPL-MCNC: 74 MG/DL (ref 65–140)
LDH SERPL-CCNC: 162 U/L (ref 81–234)
MAGNESIUM SERPL-MCNC: 1.9 MG/DL (ref 1.6–2.6)
PHOSPHATE SERPL-MCNC: 2.2 MG/DL (ref 2.3–4.1)
POTASSIUM SERPL-SCNC: 4 MMOL/L (ref 3.5–5.3)
PROT SERPL-MCNC: 6.2 G/DL (ref 6.4–8.4)
SODIUM SERPL-SCNC: 136 MMOL/L (ref 135–147)
TRIGL SERPL-MCNC: 120 MG/DL

## 2022-08-07 PROCEDURE — 99232 SBSQ HOSP IP/OBS MODERATE 35: CPT | Performed by: INTERNAL MEDICINE

## 2022-08-07 PROCEDURE — 80048 BASIC METABOLIC PNL TOTAL CA: CPT | Performed by: INTERNAL MEDICINE

## 2022-08-07 PROCEDURE — 83735 ASSAY OF MAGNESIUM: CPT | Performed by: INTERNAL MEDICINE

## 2022-08-07 PROCEDURE — 80076 HEPATIC FUNCTION PANEL: CPT | Performed by: INTERNAL MEDICINE

## 2022-08-07 PROCEDURE — 83615 LACTATE (LD) (LDH) ENZYME: CPT | Performed by: INTERNAL MEDICINE

## 2022-08-07 PROCEDURE — 84478 ASSAY OF TRIGLYCERIDES: CPT | Performed by: INTERNAL MEDICINE

## 2022-08-07 PROCEDURE — 84100 ASSAY OF PHOSPHORUS: CPT | Performed by: INTERNAL MEDICINE

## 2022-08-07 RX ADMIN — MIDODRINE HYDROCHLORIDE 5 MG: 5 TABLET ORAL at 16:53

## 2022-08-07 RX ADMIN — Medication 250 MG: at 16:53

## 2022-08-07 RX ADMIN — MISOPROSTOL 200 MCG: 200 TABLET ORAL at 11:44

## 2022-08-07 RX ADMIN — SUCRALFATE 1 G: 1 TABLET ORAL at 16:53

## 2022-08-07 RX ADMIN — SUCRALFATE 1 G: 1 TABLET ORAL at 06:33

## 2022-08-07 RX ADMIN — PANTOPRAZOLE SODIUM 40 MG: 40 TABLET, DELAYED RELEASE ORAL at 05:02

## 2022-08-07 RX ADMIN — CLONAZEPAM 3 MG: 1 TABLET ORAL at 22:08

## 2022-08-07 RX ADMIN — MIDODRINE HYDROCHLORIDE 5 MG: 5 TABLET ORAL at 06:33

## 2022-08-07 RX ADMIN — MISOPROSTOL 200 MCG: 200 TABLET ORAL at 22:15

## 2022-08-07 RX ADMIN — HEPARIN SODIUM 5000 UNITS: 5000 INJECTION INTRAVENOUS; SUBCUTANEOUS at 05:02

## 2022-08-07 RX ADMIN — TRAZODONE HYDROCHLORIDE 150 MG: 100 TABLET ORAL at 22:08

## 2022-08-07 RX ADMIN — SUCRALFATE 1 G: 1 TABLET ORAL at 22:08

## 2022-08-07 RX ADMIN — MISOPROSTOL 200 MCG: 200 TABLET ORAL at 17:00

## 2022-08-07 RX ADMIN — LEVOTHYROXINE SODIUM 112 MCG: 112 TABLET ORAL at 05:02

## 2022-08-07 RX ADMIN — Medication: at 22:00

## 2022-08-07 RX ADMIN — HEPARIN SODIUM 5000 UNITS: 5000 INJECTION INTRAVENOUS; SUBCUTANEOUS at 14:24

## 2022-08-07 RX ADMIN — HEPARIN SODIUM 5000 UNITS: 5000 INJECTION INTRAVENOUS; SUBCUTANEOUS at 22:10

## 2022-08-07 RX ADMIN — PANTOPRAZOLE SODIUM 40 MG: 40 TABLET, DELAYED RELEASE ORAL at 16:54

## 2022-08-07 RX ADMIN — Medication 125 MG: at 05:02

## 2022-08-07 RX ADMIN — POTASSIUM CHLORIDE 40 MEQ: 1500 TABLET, EXTENDED RELEASE ORAL at 08:08

## 2022-08-07 RX ADMIN — MIDODRINE HYDROCHLORIDE 5 MG: 5 TABLET ORAL at 11:43

## 2022-08-07 RX ADMIN — SUCRALFATE 1 G: 1 TABLET ORAL at 11:43

## 2022-08-07 RX ADMIN — MISOPROSTOL 200 MCG: 200 TABLET ORAL at 08:08

## 2022-08-07 RX ADMIN — Medication 125 MG: at 17:03

## 2022-08-07 RX ADMIN — Medication 6 MG: at 22:08

## 2022-08-07 RX ADMIN — Medication 250 MG: at 08:08

## 2022-08-07 NOTE — PLAN OF CARE
Problem: INFECTION - ADULT  Goal: Absence or prevention of progression during hospitalization  Description: INTERVENTIONS:  - Assess and monitor for signs and symptoms of infection  - Monitor lab/diagnostic results  - Monitor all insertion sites, i e  indwelling lines, tubes, and drains  - Monitor endotracheal if appropriate and nasal secretions for changes in amount and color  - Bruce appropriate cooling/warming therapies per order  - Administer medications as ordered  - Instruct and encourage patient and family to use good hand hygiene technique  - Identify and instruct in appropriate isolation precautions for identified infection/condition  Outcome: Progressing     Problem: Nutrition/Hydration-ADULT  Goal: Nutrient/Hydration intake appropriate for improving, restoring or maintaining nutritional needs  Description: Monitor and assess patient's nutrition/hydration status for malnutrition  Collaborate with interdisciplinary team and initiate plan and interventions as ordered  Monitor patient's weight and dietary intake as ordered or per policy  Utilize nutrition screening tool and intervene as necessary  Determine patient's food preferences and provide high-protein, high-caloric foods as appropriate       INTERVENTIONS:  - Monitor oral intake, urinary output, labs, and treatment plans  - Assess nutrition and hydration status and recommend course of action  - Evaluate amount of meals eaten  - Assist patient with eating if necessary   - Allow adequate time for meals  - Recommend/ encourage appropriate diets, oral nutritional supplements, and vitamin/mineral supplements  - Order, calculate, and assess calorie counts as needed  - Recommend, monitor, and adjust tube feedings and TPN/PPN based on assessed needs  - Assess need for intravenous fluids  - Provide specific nutrition/hydration education as appropriate  - Include patient/family/caregiver in decisions related to nutrition  Outcome: Progressing

## 2022-08-07 NOTE — NUTRITION
08/07/22 1414   Recommendations/Interventions   Recommendations to Provider Replete phosphorus  If electrolytes stable after standard TPN, suggest advancing to goal to provide 75% of needs  Goal: 450ml 15% AA, 650ml 30% dextrose, 100ml 20% lipids  Provides 1133kcal, 68gPRO, 1200ml   Monitor electrolytes

## 2022-08-07 NOTE — PROGRESS NOTES
[address] [facility]  Progress Note - Carey Tobar 1945, 76 y o  female MRN: [id number]  [facility] Encounter: [id number]  Primary Care Provider: Errol Taylor MD   Date and time admitted to hospital: 7/25/2022 10:04 PM    * Septic shock [facility])  Assessment & Plan  - presented with septic shock secondary to PICC line related bacteremia  - PICC line has since been removed  - initial improvement on IV cefazolin for treatment of bacteremia, however developed leukocytosis, hypotension, and fever on 07/30  Further workup revealed abnormal right upper quadrant ultrasound, though follow-up MRI negative for cholecystitis  - antibiotics discontinued by ID on 08/05  - monitor off antibiotics over the weekend  - right upper extremity PICC line replaced on 08/06 for reinitiation of TPN  Gram-positive bacteremia  Assessment & Plan  - MSSA bacteremia secondary to PICC line infection; had previously been on TPN  - PICC line has since been removed  - echocardiogram without vegetation  - repeat blood cultures have cleared    - completed full course of IV antibiotics while inpatient per ID recommendations  - monitor off antibiotics    H/O bariatric surgery - bypass  Assessment & Plan  - 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 at [facility]  - has had significant malnutrition and issues with anastomotic ulcerations  - recently seen by bariatric service during last hospitalization at [facility]  They recommended prolonged course of TPN for nutritional optimization with plans for revision of bypass in the future  She has an appointment with them on 08/20  We will replace PICC line and continue TPN until she can be seen by them at follow-up and surgical plans are solidified        Diarrhea  Assessment & Plan  - Ongoing diarrhea for the past several months  - Malabsorption    - continue C diff prophylaxis with p o  vancomycin 3 days post discontinuation of systemic antibiotics through 8/8    Fall  Assessment & Plan  - unwitnessed fall out of bed on 08/04  - trauma workup including CT of the head and cervical spine were unremarkable  - no other clear injuries or trauma    - fall precautions    Sacral ulcer ([facility] [address] )  Assessment & Plan  - Sacral decubitus ulcer without evidence of cellulitis  - Continue with local care    Severe protein-calorie malnutrition ([facility] [address] )  Assessment & Plan  Malnutrition Findings:   Adult Malnutrition type: Chronic illness  Adult Degree of Malnutrition: Other severe protein calorie malnutrition  Malnutrition Characteristics: Fat loss, Muscle loss, Inadequate energy                  360 Statement: Severe malnutrition in setting of chronic illness related to malabsorption and poor po intake as evidenced by overt loss of body fat and muscle mass observed (severe orbital wasting , severe trapezius wasting, severe quadricep wasting), and pt reported poor po intakes  Pt very  loquacious  BMI Findings: Body mass index is 19 91 kg/m²  Patient on chronic TPN post remote gastric bypass  Currently TPN on hold  Continue with oral diet  Nutrition recommending resuming TPN once medically appropriate to me daily needs    Anemia  Assessment & Plan  - Anemia of chronic disease  - Status post blood transfusion 7/26 and 7/31  - cont to monitor    Depression  Assessment & Plan  - Continue home med of trazodone and Klonopin    Hypothyroidism  Assessment & Plan  - Continue levothyroxine      VTE Pharmacologic Prophylaxis:   High Risk (Score >/= 5) - Pharmacological DVT Prophylaxis Ordered: heparin  Sequential Compression Devices Ordered  Patient Centered Rounds: I performed bedside rounds with nursing staff today  Discussions with Specialists or Other Care Team Provider: CM      Education and Discussions with Family / Patient: Patient declined call to   Time Spent for Care: 30 minutes   More than 50% of total time spent on counseling and coordination of care as described above  Current Length of Stay: 12 day(s)  Current Patient Status: Inpatient   Certification Statement: The patient will continue to require additional inpatient hospital stay due to fever, IV abx's, further diagnostic workup  Discharge Plan: Anticipate discharge in >72 hrs to rehab facility  Code Status: Level 1 - Full Code    Subjective:   Patient seen and examined  Following up for the numerous issues listed above  Some decreased appetite and abdominal discomfort today but no overt pain  Still able to tolerate food  Restart TPN today    Objective:     Vitals:   Temp (24hrs), Av 4 °F (36 9 °C), Min:98 2 °F (36 8 °C), Max:98 6 °F (37 °C)    Temp:  [98 2 °F (36 8 °C)-98 6 °F (37 °C)] 98 3 °F (36 8 °C)  HR:  [66-89] 74  Resp:  [16-18] 18  BP: (109-123)/(53-70) 115/62  SpO2:  [91 %-98 %] 95 %  Body mass index is 23 17 kg/m²  Input and Output Summary (last 24 hours): Intake/Output Summary (Last 24 hours) at 2022 1113  Last data filed at 2022 0900  Gross per 24 hour   Intake 440 ml   Output 1550 ml   Net -1110 ml       PHYSICAL EXAM:    Vitals signs reviewed  Constitutional   Awake and cooperative  Chronically ill-appearing but nontoxic  Head/Neck   Normocephalic  Atraumatic  HEENT   No scleral icterus  EOMI  Heart   Regular rate and rhythm  No murmers  Lungs   Clear to auscultation bilaterally  Respirations unlaboured  Abdomen   Soft  Nondistended  Mild right upper quadrant tenderness to palpation  Skin   Skin color normal  No rashes  Extremities   No deformities  No peripheral edema  Neuro   Alert and oriented  No new deficits  Psych   Mood stable   Affect normal          Additional Data:     Labs:  Results from last 7 days   Lab Units 22  0604 22  0620 22  0600   WBC Thousand/uL 13 63*   < > 11 46*   HEMOGLOBIN g/dL 8 6*   < > 8 6*   HEMATOCRIT % 27 1*   < > 26 9*   PLATELETS Thousands/uL 447*   < > 413*   NEUTROS PCT %  --   --  58   LYMPHS PCT %  --   --  23   MONOS PCT %  --   --  7   EOS PCT %  --   --  11*    < > = values in this interval not displayed  Results from last 7 days   Lab Units 08/07/22  0509   SODIUM mmol/L 136   POTASSIUM mmol/L 4 0   CHLORIDE mmol/L 109*   CO2 mmol/L 24   BUN mg/dL 9   CREATININE mg/dL 1 10   ANION GAP mmol/L 3*   CALCIUM mg/dL 8 6   ALBUMIN g/dL 1 7*   TOTAL BILIRUBIN mg/dL 0 27   ALK PHOS U/L 89   ALT U/L 7*   AST U/L 16   GLUCOSE RANDOM mg/dL 74         Results from last 7 days   Lab Units 07/31/22  1137   POC GLUCOSE mg/dl 94         Results from last 7 days   Lab Units 08/05/22  0629 08/04/22  0604 08/01/22  0419   PROCALCITONIN ng/ml 0 31* 0 50* 3 19*       Lines/Drains:  Invasive Devices  Report    Peripherally Inserted Central Catheter Line  Duration           PICC Line 08/06/22 Right Brachial 1 day          Drain  Duration           External Urinary Catheter <1 day                      Imaging: No pertinent imaging reviewed      Recent Cultures (last 7 days):   Results from last 7 days   Lab Units 07/31/22  1458   C DIFF TOXIN B BY PCR  Positive*       Last 24 Hours Medication List:   Current Facility-Administered Medications   Medication Dose Route Frequency Provider Last Rate    acetaminophen  650 mg Oral Q4H PRN Willis Cartagena DO      Adult TPN (STANDARD BASE/STANDARD ELECTROLYTE)   Intravenous Continuous TPN Ashley Hughes,       calcium carbonate  500 mg Oral Daily PRN TOLU Rowland      clonazePAM  3 mg Oral HS Manuel Middleton DO      heparin (porcine)  5,000 Units Subcutaneous Q8H [facility] Manuel Middleton DO      levothyroxine  112 mcg Oral Early Morning Manuel Middleton DO      loperamide  2 mg Oral TID PRN Willis Cartagena DO      melatonin  6 mg Oral HS Marioricky Bravoke, DO      midodrine  5 mg Oral TID AC Mario Bretrand, DO      miSOPROStol  200 mcg Oral 4x Daily (with meals and at bedtime) Manuel Middleton,       ondansetron  4 mg Intravenous Q8H PRN Manuel Middleton DO      pantoprazole  40 mg Oral BID AC Mario Thurston DO      saccharomyces boulardii  250 mg Oral BID Nieves Mata DO      sucralfate  1 g Oral 4x Daily (AC & HS) Di Flowers DO      traZODone  150 mg Oral HS Di Flowers DO      vancomycin  125 mg Oral Q12H [facility] Steve Zayas DO          Today, Patient Was Seen By: Lazarus Opitz Starsinic, DO    **Please Note: This note may have been constructed using a voice recognition system  **

## 2022-08-08 LAB
ALBUMIN SERPL BCP-MCNC: 1.7 G/DL (ref 3.5–5)
ALP SERPL-CCNC: 82 U/L (ref 46–116)
ALT SERPL W P-5'-P-CCNC: 6 U/L (ref 12–78)
ANION GAP SERPL CALCULATED.3IONS-SCNC: 6 MMOL/L (ref 4–13)
AST SERPL W P-5'-P-CCNC: 17 U/L (ref 5–45)
BILIRUB DIRECT SERPL-MCNC: 0.14 MG/DL (ref 0–0.2)
BILIRUB SERPL-MCNC: 0.25 MG/DL (ref 0.2–1)
BUN SERPL-MCNC: 10 MG/DL (ref 5–25)
CALCIUM SERPL-MCNC: 8.6 MG/DL (ref 8.3–10.1)
CHLORIDE SERPL-SCNC: 105 MMOL/L (ref 96–108)
CO2 SERPL-SCNC: 25 MMOL/L (ref 21–32)
CREAT SERPL-MCNC: 1.12 MG/DL (ref 0.6–1.3)
FLUAV RNA RESP QL NAA+PROBE: NEGATIVE
FLUBV RNA RESP QL NAA+PROBE: NEGATIVE
GFR SERPL CREATININE-BSD FRML MDRD: 46 ML/MIN/1.73SQ M
GLUCOSE SERPL-MCNC: 99 MG/DL (ref 65–140)
LDH SERPL-CCNC: 139 U/L (ref 81–234)
MAGNESIUM SERPL-MCNC: 2.1 MG/DL (ref 1.6–2.6)
PHOSPHATE SERPL-MCNC: 3 MG/DL (ref 2.3–4.1)
POTASSIUM SERPL-SCNC: 3.8 MMOL/L (ref 3.5–5.3)
PROT SERPL-MCNC: 6.5 G/DL (ref 6.4–8.4)
RSV RNA RESP QL NAA+PROBE: NEGATIVE
SARS-COV-2 RNA RESP QL NAA+PROBE: NEGATIVE
SODIUM SERPL-SCNC: 136 MMOL/L (ref 135–147)
TRIGL SERPL-MCNC: 111 MG/DL

## 2022-08-08 PROCEDURE — 97530 THERAPEUTIC ACTIVITIES: CPT

## 2022-08-08 PROCEDURE — 99232 SBSQ HOSP IP/OBS MODERATE 35: CPT | Performed by: INTERNAL MEDICINE

## 2022-08-08 PROCEDURE — 84100 ASSAY OF PHOSPHORUS: CPT | Performed by: INTERNAL MEDICINE

## 2022-08-08 PROCEDURE — 0241U HB NFCT DS VIR RESP RNA 4 TRGT: CPT | Performed by: INTERNAL MEDICINE

## 2022-08-08 PROCEDURE — 99239 HOSP IP/OBS DSCHRG MGMT >30: CPT | Performed by: INTERNAL MEDICINE

## 2022-08-08 PROCEDURE — 80048 BASIC METABOLIC PNL TOTAL CA: CPT | Performed by: INTERNAL MEDICINE

## 2022-08-08 PROCEDURE — 83615 LACTATE (LD) (LDH) ENZYME: CPT | Performed by: INTERNAL MEDICINE

## 2022-08-08 PROCEDURE — 80076 HEPATIC FUNCTION PANEL: CPT | Performed by: INTERNAL MEDICINE

## 2022-08-08 PROCEDURE — 97535 SELF CARE MNGMENT TRAINING: CPT

## 2022-08-08 PROCEDURE — 83735 ASSAY OF MAGNESIUM: CPT | Performed by: INTERNAL MEDICINE

## 2022-08-08 PROCEDURE — 84478 ASSAY OF TRIGLYCERIDES: CPT | Performed by: INTERNAL MEDICINE

## 2022-08-08 PROCEDURE — 97116 GAIT TRAINING THERAPY: CPT

## 2022-08-08 RX ORDER — MIDODRINE HYDROCHLORIDE 5 MG/1
5 TABLET ORAL
Refills: 0 | Status: ON HOLD
Start: 2022-08-08

## 2022-08-08 RX ADMIN — TRAZODONE HYDROCHLORIDE 150 MG: 100 TABLET ORAL at 21:58

## 2022-08-08 RX ADMIN — Medication 250 MG: at 17:51

## 2022-08-08 RX ADMIN — MISOPROSTOL 200 MCG: 200 TABLET ORAL at 06:49

## 2022-08-08 RX ADMIN — HEPARIN SODIUM 5000 UNITS: 5000 INJECTION INTRAVENOUS; SUBCUTANEOUS at 05:44

## 2022-08-08 RX ADMIN — Medication 125 MG: at 17:54

## 2022-08-08 RX ADMIN — MISOPROSTOL 200 MCG: 200 TABLET ORAL at 21:58

## 2022-08-08 RX ADMIN — HEPARIN SODIUM 5000 UNITS: 5000 INJECTION INTRAVENOUS; SUBCUTANEOUS at 14:19

## 2022-08-08 RX ADMIN — Medication 125 MG: at 05:50

## 2022-08-08 RX ADMIN — SUCRALFATE 1 G: 1 TABLET ORAL at 05:47

## 2022-08-08 RX ADMIN — Medication 6 MG: at 21:58

## 2022-08-08 RX ADMIN — MIDODRINE HYDROCHLORIDE 5 MG: 5 TABLET ORAL at 17:51

## 2022-08-08 RX ADMIN — MISOPROSTOL 200 MCG: 200 TABLET ORAL at 17:51

## 2022-08-08 RX ADMIN — SUCRALFATE 1 G: 1 TABLET ORAL at 17:51

## 2022-08-08 RX ADMIN — CLONAZEPAM 3 MG: 1 TABLET ORAL at 21:58

## 2022-08-08 RX ADMIN — MIDODRINE HYDROCHLORIDE 5 MG: 5 TABLET ORAL at 10:58

## 2022-08-08 RX ADMIN — Medication: at 23:29

## 2022-08-08 RX ADMIN — SUCRALFATE 1 G: 1 TABLET ORAL at 21:58

## 2022-08-08 RX ADMIN — HEPARIN SODIUM 5000 UNITS: 5000 INJECTION INTRAVENOUS; SUBCUTANEOUS at 21:58

## 2022-08-08 RX ADMIN — MISOPROSTOL 200 MCG: 200 TABLET ORAL at 14:18

## 2022-08-08 RX ADMIN — PANTOPRAZOLE SODIUM 40 MG: 40 TABLET, DELAYED RELEASE ORAL at 17:51

## 2022-08-08 RX ADMIN — Medication 250 MG: at 05:46

## 2022-08-08 RX ADMIN — SUCRALFATE 1 G: 1 TABLET ORAL at 10:58

## 2022-08-08 RX ADMIN — LEVOTHYROXINE SODIUM 112 MCG: 112 TABLET ORAL at 05:46

## 2022-08-08 RX ADMIN — MIDODRINE HYDROCHLORIDE 5 MG: 5 TABLET ORAL at 05:47

## 2022-08-08 RX ADMIN — PANTOPRAZOLE SODIUM 40 MG: 40 TABLET, DELAYED RELEASE ORAL at 05:47

## 2022-08-08 NOTE — PLAN OF CARE
Problem: PHYSICAL THERAPY ADULT  Goal: Performs mobility at highest level of function for planned discharge setting  See evaluation for individualized goals  Description: Treatment/Interventions: Functional transfer training, LE strengthening/ROM, Therapeutic exercise, Endurance training, Bed mobility, Gait training, Equipment eval/education, OT  Equipment Recommended: Walker       See flowsheet documentation for full assessment, interventions and recommendations  8/8/2022 1157 by Maxim Fernandez, PT  Outcome: Progressing  Note: Prognosis: Fair  Problem List: Decreased strength, Impaired balance, Decreased endurance, Decreased mobility, Decreased safety awareness  Assessment: Pt seen for PT session w/ focus on bed mobility training, t/f training, + gait training  Pt demonstrated good progress this session noted by decreased assistance required for bed mobility + increased distance covered during gait training  Pt required multiple verbal + manual cues w/ the RW for obstacle neogtiation  She may benefit from rollator training as it may be easier for her to steer  From a PT standpoint continue to recommend rehab at this time as pt is still operating below her functional baseline of being independent  Barriers to Discharge: Inaccessible home environment, Decreased caregiver support     PT Discharge Recommendation: Post acute rehabilitation services    See flowsheet documentation for full assessment       8/8/2022 1157 by Maxim Fernandez, PT  Outcome: Progressing

## 2022-08-08 NOTE — CASE MANAGEMENT
Case Management Discharge Planning Note    Patient name Quay Hamman  Location [facility] 828/PPHP 171-02 MRN [id number]  : 1945 Date 2022       Current Admission Date: 2022  Current Admission Diagnosis:Septic shock [facility])   Patient Active Problem List    Diagnosis Date Noted    Fall 2022    Diarrhea 2022    Septic shock ([facility] [address] ) 2022    Sacral ulcer ([facility] [address] ) 2022    Gram-positive bacteremia 2022    Severe protein-calorie malnutrition ([facility] [address] ) 2022    Bilateral leg edema 2022    Ambulatory dysfunction 2022    Acute blood loss anemia 2022    Gastric ulcer 2022    Fever 2022    Anemia 2022    Dyspnea on exertion 2022    Generalized weakness 2022    Hyponatremia 2022    Abnormal CT scan 2022    H/O bariatric surgery - bypass 2022    Cerumen debris on tympanic membrane of right ear 2022    Nasal congestion 2022    Bilateral hearing loss 2022    Fibromyalgia syndrome 2021    Osteopenia of both forearms 2021    Medicare annual wellness visit, subsequent 2021    Shortness of breath 2021    Acute bronchitis 2021    COVID-19 2021    Dysphasia 2020    Epigastric pain 2020    Hypothyroidism 2020    Gastroesophageal reflux disease without esophagitis 2020    Depression 2020    Fibromyalgia, primary 2020    Iron deficiency 2020    Numbness of foot 2020      LOS (days): 13  Geometric Mean LOS (GMLOS) (days): 4 80  Days to GMLOS:-8 5     OBJECTIVE:  Risk of Unplanned Readmission Score: 23 27         Current admission status: Inpatient   Preferred Pharmacy:   [facility] [address] [facility], [address] [place] [address] [address] [facility] [address]  Phone: [phone] Fax: [phone]    Primary Care Provider: Geremias Archibald MD    Primary Insurance: [employer] [facility] RE[facility]  Secondary Insurance:     DISCHARGE DETAILS:    Met with pt to discuss DCP  Agreeable to [facility]  IMM reviewed and signed    TCT brother Timoteo Forward notified of DC  To text him transport time    Transport via [address]  Transport 1PM via [employer]    Notified by [employer], pt is not a bedhold and will need insurance auth    PT/OT notified  Transport cx    PT and brother notified probable DC in AM                                                                                              IMM Given (Date):: 08/08/22  IMM Given to[de-identified] Patient

## 2022-08-08 NOTE — RESTORATIVE TECHNICIAN NOTE
Restorative Technician Note      Patient Name: Conor Finch     Restorative Tech Visit Date: 8/8/2022  Note Type: Mobility  Patient Position Upon Consult: Other (Comment) (attempting to stand alone from bedside chair)  Activity Performed: Ambulated  Assistive Device: Other (Comment) (Ax1 HHA within room; had to redirect patient safely to bed)  Patient Position at End of Consult: Supine;  All needs within reach; Bed/Chair alarm activated    Zelda Delatorre  DPT, Restorative Technician

## 2022-08-08 NOTE — PHYSICAL THERAPY NOTE
Physical Therapy Treatment Note    Patient's Name: Peter Lr  : 22 1147   PT Last Visit   PT Visit Date 22   Note Type   Note Type Treatment for insurance authorization   Pain Assessment   Pain Assessment Tool 0-10   Pain Score No Pain   Restrictions/Precautions   Weight Bearing Precautions Per Order No   Other Precautions Contact/isolation; Fall Risk;Multiple lines;Limb alert; Chair Alarm; Bed Alarm  (RUE limb alert)   General   Chart Reviewed Yes   Response to Previous Treatment Patient with no complaints from previous session  Family/Caregiver Present No   Subjective   Subjective "I'm going to [employer] today "   Bed Mobility   Supine to Sit 5  Supervision   Additional items Increased time required;Verbal cues   Sit to Supine Unable to assess   Additional Comments Pt greeted in supine  Transfers   Sit to Stand 4  Minimal assistance   Additional items Assist x 1; Increased time required;Verbal cues   Stand to Sit 4  Minimal assistance   Additional items Assist x 1; Increased time required;Verbal cues   Stand pivot 4  Minimal assistance   Additional items Assist x 1; Increased time required;Verbal cues   Additional Comments RW   Ambulation/Elevation   Gait pattern Excessively slow; Poor UE support; Short stride; Inconsistent beth  (path deviation)   Gait Assistance 4  Minimal assist   Additional items Assist x 1;Verbal cues; Tactile cues   Assistive Device Rolling walker   Distance 60'   Ambulation/Elevation Additional Comments manual cues w/ RW for obstacle negotiation   Balance   Static Sitting Fair +   Dynamic Sitting Fair   Static Standing Fair -   Dynamic Standing Poor +   Ambulatory Poor +  (RW)   Endurance Deficit   Endurance Deficit Yes   Activity Tolerance   Activity Tolerance Patient tolerated treatment well;Patient limited by fatigue   Medical Staff Made Aware RACHEL Gillis   Assessment   Prognosis Fair   Problem List Decreased strength; Impaired balance;Decreased endurance;Decreased mobility; Decreased safety awareness   Assessment Pt seen for PT session w/ focus on bed mobility training, t/f training, + gait training  Pt demonstrated good progress this session noted by decreased assistance required for bed mobility + increased distance covered during gait training  Pt required multiple verbal + manual cues w/ the RW for obstacle neogtiation  She may benefit from rollator training as it may be easier for her to steer  From a PT standpoint continue to recommend rehab at this time as pt is still operating below her functional baseline of being independent  Barriers to Discharge Inaccessible home environment;Decreased caregiver support   Goals   Patient Goals to go to [facility] [address] today   PT Treatment Day 3   Plan   Treatment/Interventions Functional transfer training;LE strengthening/ROM; Therapeutic exercise; Endurance training;Patient/family training;Equipment eval/education; Bed mobility;Gait training; Compensatory technique education;OT;Spoke to case management  (balance training)   Progress Progressing toward goals   PT Frequency 3-5x/wk   Recommendation   PT Discharge Recommendation Post acute rehabilitation services   Equipment Recommended [facility] Recommended Wheeled walker   Change/add to [employer]? No   AM-PAC Basic Mobility Inpatient   Turning in Bed Without Bedrails 3   Lying on Back to Sitting on Edge of Flat Bed 3   Moving Bed to Chair 3   Standing Up From Chair 3   Walk in Room 3   Climb 3-5 Stairs 1   Basic Mobility Inpatient Raw Score 16   Basic Mobility Standardized Score 38 32   Highest Level Of Mobility   JH-HLM Goal 5: Stand one or more mins   JH-HLM Achieved 7: Walk 25 feet or more   Education   Education Provided Mobility training;Assistive device   Patient Demonstrates acceptance/verbal understanding;Reinforcement needed   End of Consult   Patient Position at End of Consult Bedside chair; All needs within reach  (all lines in tact, on waffle cushion, OT + OTS at bedside for treatment)     Debra Zuniga, PT, DPT

## 2022-08-08 NOTE — PLAN OF CARE
Problem: INFECTION - ADULT  Goal: Absence or prevention of progression during hospitalization  Description: INTERVENTIONS:  - Assess and monitor for signs and symptoms of infection  - Monitor lab/diagnostic results  - Monitor all insertion sites, i e  indwelling lines, tubes, and drains  - Monitor endotracheal if appropriate and nasal secretions for changes in amount and color  - [place] appropriate cooling/warming therapies per order  - Administer medications as ordered  - Instruct and encourage patient and family to use good hand hygiene technique  - Identify and instruct in appropriate isolation precautions for identified infection/condition  Outcome: Progressing     Problem: Nutrition/Hydration-ADULT  Goal: Nutrient/Hydration intake appropriate for improving, restoring or maintaining nutritional needs  Description: Monitor and assess patient's nutrition/hydration status for malnutrition  Collaborate with interdisciplinary team and initiate plan and interventions as ordered  Monitor patient's weight and dietary intake as ordered or per policy  Utilize nutrition screening tool and intervene as necessary  Determine patient's food preferences and provide high-protein, high-caloric foods as appropriate       INTERVENTIONS:  - Monitor oral intake, urinary output, labs, and treatment plans  - Assess nutrition and hydration status and recommend course of action  - Evaluate amount of meals eaten  - Assist patient with eating if necessary   - Allow adequate time for meals  - Recommend/ encourage appropriate diets, oral nutritional supplements, and vitamin/mineral supplements  - Order, calculate, and assess calorie counts as needed  - Recommend, monitor, and adjust tube feedings and TPN/PPN based on assessed needs  - Assess need for intravenous fluids  - Provide specific nutrition/hydration education as appropriate  - Include patient/family/caregiver in decisions related to nutrition  Outcome: Progressing     Problem: METABOLIC, FLUID AND ELECTROLYTES - ADULT  Goal: Electrolytes maintained within normal limits  Description: INTERVENTIONS:  - Monitor labs and assess patient for signs and symptoms of electrolyte imbalances  - Administer electrolyte replacement as ordered  - Monitor response to electrolyte replacements, including repeat lab results as appropriate  - Instruct patient on fluid and nutrition as appropriate  Outcome: Progressing

## 2022-08-08 NOTE — PROGRESS NOTES
Progress Note - Infectious Disease   Coleman Brown 76 y o  female MRN: [id number]  Unit/Bed#: [facility] 828-01 Encounter: [id number]      Impression/Plan:  1  Septic shock, present on admission, likely secondary to PICC related bacteremia  [facility] has been removed   Patient was clinically much improved,, off pressors and out of ICU   However, she had recurrent sepsis subsequently, felt to be secondary to aspiration pneumonia  Patient did well with a course of IV Zosyn  She remains well off it  Repeat blood cultures had no growth  Observe off further antibiotic  Monitor temperature/WBC  Monitor hemodynamics  Serial exams     2  MSSE bacteremia, most likely secondary to PICC line, with patient on TPN  [facility] has been removed   2D echo without vegetation   Repeat blood culture obtained on 07/26 still has growth but this was drawn prior to PICC removal   Repeat blood cultures on 07/28 have no growth thus far    Patient completed antibiotic course for this  No further antibiotic for this      3  NOAH, present on admission, most likely secondary to septic shock   Creatinine is much improved and now stabilized  Antibiotic at full dose  Recheck BMP     4  Small sacral decubitus ulcer, without evidence of cellulitis  Local ulcer care  Monitor      5  Gastric ulcer, stable on PPI      6  Severe protein calorie malnutrition, on outpatient TPN   Given septic shock of PICC origin, risks and benefits of continued TPN should be reassessed      7  Status post distant gastric bypass      8  C difficile infection versus colonization   With a white count coming dramatically down without any treatment and the patient's fever resolving, doubt this is the cause of the patient's most recent deterioration   She does have chronic diarrhea that is unchanged   PCR is positive but EIA is negative   Leukocytosis has resolved  Continue oral vancomycin prophylaxis through 8/8/2022 to complete 72 hours after completion of the other systemic antibiotics  Monitor stool output     Discussed with patient in detail regarding the above plan  Okay for discharge from ID viewpoint      Antibiotics:  Off systemic antibiotic  Oral vancomycin prophylaxis     Subjective:  Patient is comfortable  She has generalized fatigue but no focal complaints  No dyspnea or cough  Temperature stays down  No chills  Stable mild chronic diarrhea        Objective:  Vitals:  Temp:  [98 °F (36 7 °C)] 98 °F (36 7 °C)  HR:  [74-94] 94  Resp:  [20-24] 24  BP: (96-98)/(53-62) 96/53  SpO2:  [93 %-94 %] 93 %  Temp (24hrs), Av °F (36 7 °C), Min:98 °F (36 7 °C), Max:98 °F (36 7 °C)  Current: Temperature: 98 °F (36 7 °C)    Physical Exam:     General: Awake, alert, cooperative, no distress  Neck:  Supple  No mass  No lymphadenopathy  Lungs: Expansion symmetric, no rales, no wheezing, respirations unlabored  Heart:  Regular rate and rhythm, S1 and S2 normal, no murmur  Abdomen: Soft, nondistended, non-tender, bowel sounds active all four quadrants, no masses, no organomegaly  Extremities: No edema  No erythema/warmth  No ulcer  Nontender to palpation  Skin:  No rash  Neuro: Moves all extremities  Invasive Devices  Report    Peripherally Inserted Central Catheter Line  Duration           PICC Line 22 Right Brachial 2 days          Drain  Duration           External Urinary Catheter 2 days                Labs studies:   I have personally reviewed pertinent labs    Results from last 7 days   Lab Units 22  0542 22  0509 22  0629 22  0604 22  0620   POTASSIUM mmol/L 3 8 4 0 3 0*   < > 3 3*   CHLORIDE mmol/L 105 109* 114*   < > 114*   CO2 mmol/L 25 24 20*   < > 18*   BUN mg/dL 10 9 9   < > 10   CREATININE mg/dL 1 12 1 10 1 17   < > 1 19   EGFR ml/min/1 73sq m 46 47 44   < > 43   CALCIUM mg/dL 8 6 8 6 8 1*   < > 8 2*   AST U/L 17 16  --   --  25   ALT U/L 6* 7*  --   --  7*   ALK PHOS U/L 82 89  --   --  103    < > = values in this interval not displayed  Results from last 7 days   Lab Units 08/04/22  0604 08/03/22  0620 08/02/22  0600   WBC Thousand/uL 13 63* 9 82 11 46*   HEMOGLOBIN g/dL 8 6* 8 2* 8 6*   PLATELETS Thousands/uL 447* 452* 413*           Imaging Studies:   I have personally reviewed pertinent imaging study reports and images in PACS  EKG, Pathology, and Other Studies:   I have personally reviewed pertinent reports

## 2022-08-08 NOTE — OCCUPATIONAL THERAPY NOTE
Occupational Therapy Progress Note     Patient Name: Dayna URBINA Date: 8/8/2022  Problem List  Principal Problem:    Septic shock [facility])  Active Problems:    Hypothyroidism    Depression    Anemia    H/O bariatric surgery - bypass    Severe protein-calorie malnutrition (HCC)    Gram-positive bacteremia    Sacral ulcer ([facility] [address] )    Diarrhea    Fall          08/08/22 1156   OT Last Visit   OT Visit Date 08/08/22   Note Type   Note Type Treatment for insurance authorization   Restrictions/Precautions   Weight Bearing Precautions Per Order No   Other Precautions Contact/isolation;Pain; Fall Risk;Limb alert; Bed Alarm; Chair Alarm   Lifestyle   Autonomy Pt was I w/ ADLs, IADLs, and mobility at baseline  No DME use  Reciprocal Relationships Supportive sister, brother in law, and brother (per chart review)   Service to Others retired    Nikolay Doe enjoys gardening and managing her home  Reports being very artistic   Pain Assessment   Pain Assessment Tool 0-10   Pain Score No Pain   ADL   Where Assessed Chair   Eating Assistance 5  Supervision/Setup   Eating Deficit Setup   LB Bathing Assistance 3  Moderate Assistance   LB Bathing Deficit Setup; Increased time to complete;Supervision/safety; Buttocks; Perineal area  (seated/standing)   UB Dressing Assistance 4  Minimal Assistance   UB Dressing Deficit Setup; Increased time to complete;Supervision/safety; Fasteners   Toileting Comments Pt incontinent of urine upon arrival and requires LB bathing/toileting hygiene  Bed Mobility   Supine to Sit 5  Supervision   Additional items HOB elevated; Increased time required   Additional Comments Pt greeted supine in bed and left OOB in recliner chair at end of session  All needs met, call bell nearby, and chair alarm engaged  Transfers   Sit to Stand 4  Minimal assistance   Additional items Assist x 1; Increased time required;Verbal cues   Stand to Sit 4  Minimal assistance   Additional items Assist x 1; Increased time required;Verbal cues   Stand pivot 4  Minimal assistance   Additional items Assist x 1; Increased time required;Verbal cues   Additional Comments with RW   Functional Mobility   Functional Mobility 4  Minimal assistance   Additional Comments Min AX1 with RW- short household distances   Additional items Rolling walker   Cognition   Overall Cognitive Status WFL   Arousal/Participation Alert; Cooperative   Attention Attends with cues to redirect   Orientation Level Oriented X4   Memory Decreased recall of precautions;Decreased recall of recent events   Following Commands Follows one step commands with increased time or repetition   Comments Pt pleasant and cooperative during OT session  Pt with decreased safety awareness and lack of insight into functional deficits  Pt requires VCs/TCs for safety  Activity Tolerance   Activity Tolerance Patient tolerated treatment well   Medical Staff Made Aware RN cleared/updated  Portions of Tx completed with Oni Ibarra DPT 2* to Pt's medical complexity and decreased endurance  Assessment   Assessment Pt greeted bedside for OT treatment on 8/8/2022 focusing on maximizing independence with ADLs  Pt completes bed mobility with S, functional transfers with min A, and functional mobility with min AX1 with RW  Pt completes ADL routine seated in chair/staning: S with eating, mod A with LB bathing, and min A with LB dressing  Limitations that impact functional performance include decreased ADL status, decreased UE ROM, decreased UE strength, decreased safe judgement during ADLs, decreased cognition, decreased endurance, decreased self care transfers, decreased high level ADLs and pain   Occupational performance areas to address ADL retraining, functional transfer training, UE strengthening/ROM, endurance training, cognitive reorientation, Pt/caregiver education, equipment evaluation/education, compensatory technique education, energy conservation and activity engagement   Pt would benefit from continued skilled OT services while in hospital to maximize independence with ADLs  Will continue to follow Pt's goals and progress  Pt would benefit from post acute rehabilitation services upon DC to maximize safety and independence with ADLs and functional tasks of choice  Plan   Treatment Interventions ADL retraining;Functional transfer training;UE strengthening/ROM; Endurance training;Cognitive reorientation;Patient/family training;Equipment evaluation/education; Compensatory technique education; Activityengagement; Energy conservation   Goal Expiration Date 08/10/22   OT Treatment Day 1   OT Frequency 3-5x/wk   Recommendation   OT Discharge Recommendation Post acute rehabilitation services   Additional Comments  The patient's raw score on the AM-PAC Daily Activity inpatient short form is 16, standardized score is 35 96, less than 39 4  Patients at this level are likely to benefit from discharge to post-acute rehabilitation services  Please refer to the recommendation of the Occupational Therapist for safe discharge planning     AM-PAC Daily Activity Inpatient   Lower Body Dressing 2   Bathing 2   Toileting 2   Upper Body Dressing 3   Grooming 3   Eating 4   Daily Activity Raw Score 16   Daily Activity Standardized Score (Calc for Raw Score >=11) 35 96   -[facility] Applied Cognition Inpatient   Following a Speech/Presentation 3   Understanding Ordinary Conversation 4   Taking Medications 2   Remembering Where Things Are Placed or Put Away 3   Remembering List of 4-5 Errands 2   Taking Care of Complicated Tasks 2   Applied Cognition Raw Score 16   Applied Cognition Standardized Score 35 03       Emily Rosales MS, OTR/L

## 2022-08-08 NOTE — DISCHARGE SUMMARY
[address] [facility]  Discharge- Kalin Tim 1945, 76 y o  female MRN: [id number]  Unit/Bed#: [facility] 828-01 Encounter: [id number]  Primary Care Provider: Roberto Mensah MD   Date and time admitted to hospital: 7/25/2022 10:04 PM    * Septic shock [facility])  Assessment & Plan  - presented with septic shock secondary to PICC line related bacteremia  - PICC line has since been removed  - initial improvement on IV cefazolin for treatment of bacteremia, however developed leukocytosis, hypotension, and fever on 07/30  Further workup revealed abnormal right upper quadrant ultrasound, though follow-up MRI negative for cholecystitis  - antibiotics discontinued by ID on 08/05  - has remained afebrile and asymptomatic since discontinuation of antibiotics  - right upper extremity PICC line replaced on 08/06 for reinitiation of TPN  Gram-positive bacteremia  Assessment & Plan  - MSSA bacteremia secondary to PICC line infection; had previously been on TPN  - PICC line has since been removed  - echocardiogram without vegetation  - repeat blood cultures have cleared    - completed full course of IV antibiotics while inpatient per ID recommendations  - monitor off antibiotics    H/O bariatric surgery - bypass  Assessment & Plan  - 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 at [facility]  - has had significant malnutrition and issues with anastomotic ulcerations  - recently seen by bariatric service during last hospitalization at [facility]  They recommended prolonged course of TPN for nutritional optimization with plans for revision of bypass in the future  She has an appointment with them on 08/20  We will replace PICC line and continue TPN until she can be seen by them at follow-up and surgical plans are solidified        Diarrhea  Assessment & Plan  - Ongoing diarrhea for the past several months  - Malabsorption    - continue C diff prophylaxis with p o  vancomycin 3 days post discontinuation of systemic antibiotics through 8/8    [address]  - unwitnessed fall out of bed on 08/04  - trauma workup including CT of the head and cervical spine were unremarkable  - no other clear injuries or trauma    - fall precautions    Sacral ulcer ([facility] [address] )  Assessment & Plan  - Sacral decubitus ulcer without evidence of cellulitis  - Continue with local care    Severe protein-calorie malnutrition ([facility] [address] )  Assessment & Plan  Malnutrition Findings:   Adult Malnutrition type: Chronic illness  Adult Degree of Malnutrition: Other severe protein calorie malnutrition  Malnutrition Characteristics: Fat loss, Muscle loss, Inadequate energy                  360 Statement: Severe malnutrition in setting of chronic illness related to malabsorption and poor po intake as evidenced by overt loss of body fat and muscle mass observed (severe orbital wasting , severe trapezius wasting, severe quadricep wasting), and pt reported poor po intakes  Pt very  loquacious  BMI Findings: Body mass index is 19 91 kg/m²       Patient on chronic TPN post remote gastric bypass  Currently TPN on hold  Continue with oral diet  Nutrition recommending resuming TPN once medically appropriate to me daily needs    Anemia  Assessment & Plan  - Anemia of chronic disease  - Status post blood transfusion 7/26 and 7/31  - cont to monitor    Depression  Assessment & Plan  - Continue home med of trazodone and Klonopin    Hypothyroidism  Assessment & Plan  - Continue levothyroxine      Discharging Physician / Practitioner: Emily Shepard DO  PCP: Azul Dial MD  Admission Date:   Admission Orders (From admission, onward)     Ordered        07/26/22 0234  INPATIENT ADMISSION  Once                      Discharge Date: 08/08/22    Consultations During Hospital Stay:  · Infectious disease  · Critical care    Procedures Performed:   · PICC line removal  · PICC line placement    Significant Findings / Test Results: · Septic shock secondary to Staph bacteremia from PICC line infection  · Chronic severe malnutrition    Incidental Findings:   · None    Test Results Pending at Discharge (will require follow up): · None     Outpatient Tests Requested:  · Patient will need routine follow-up lab work while on TPN  Complications:  none    Reason for Admission:  Septic shock    Hospital Course:   Tracy Domingo is a 76 y o  female patient who originally presented to the hospital on 7/25/2022 due to fevers and chills  She was found to have staph bacteremia and septic shock  She was initially admitted to the ICU service and treated with broad-spectrum antibiotics  Ultimately who blood cultures grew MSSA and her PICC line was removed  She was treated with full course of IV antibiotics during her hospital stay, directed by Infectious Disease  She was monitor for 3 days off of antibiotics with no additional symptoms or fever  A PICC line was replaced for reinitiation of TPN which she tolerated fine  On discharge she has no and antibiotic needs  She will continue on TPN until she sees bariatric surgery on 08/20 for definitive surgical plans  Otherwise patient was feeling well at the time of discharge  All questions and concerns were addressed  Family aware of discharge plan  Please see above list of diagnoses and related plan for additional information  Condition at Discharge: good    Discharge Day Visit / Exam:   Subjective:  Patient seen examined on day of discharge  Feeling well today  Afebrile  No new complaints  Vitals: Blood Pressure: 96/53 (08/07/22 2322)  Pulse: 94 (08/07/22 2322)  Temperature: 98 °F (36 7 °C) (08/07/22 2322)  Temp Source: Oral (08/06/22 2316)  Respirations: (!) 24 (08/07/22 2322)  Height: 5' 4" (162 6 cm) (07/26/22 0715)  Weight - Scale: 61 2 kg (135 lb) (08/04/22 1452)  SpO2: 93 % (08/07/22 2322)    PHYSICAL EXAM:    Vitals signs reviewed  Constitutional   Awake and cooperative  NAD   Chronically ill-appearing but nontoxic  Malnourished  Head/Neck   Normocephalic  Atraumatic  HEENT   No scleral icterus  EOMI  Heart   Regular rate and rhythm  No murmers  Lungs   Clear to auscultation bilaterally  Respirations unlaboured  Abdomen   Soft  Nontender  Nondistended  Skin   Skin color normal  No rashes  Extremities   No deformities  No peripheral edema  Neuro   Alert and oriented  No new deficits  Psych   Mood stable  Affect normal          Discussion with Family: Patient declined call to   Discharge instructions/Information to patient and family:   See after visit summary for information provided to patient and family  Provisions for Follow-Up Care:  See after visit summary for information related to follow-up care and any pertinent home health orders  Disposition:   [facility]    Planned Readmission: NO     Discharge Statement:  I spent 40 minutes discharging the patient  This time was spent on the day of discharge  I had direct contact with the patient on the day of discharge  Greater than 50% of the total time was spent examining patient, answering all patient questions, arranging and discussing plan of care with patient as well as directly providing post-discharge instructions  Additional time then spent on discharge activities  Discharge Medications:  See after visit summary for reconciled discharge medications provided to patient and/or family        **Please Note: This note may have been constructed using a voice recognition system**

## 2022-08-08 NOTE — PLAN OF CARE
Problem: OCCUPATIONAL THERAPY ADULT  Goal: Performs self-care activities at highest level of function for planned discharge setting  See evaluation for individualized goals  Description: Treatment Interventions: ADL retraining, Functional transfer training, Endurance training, Patient/family training, Cognitive reorientation, Equipment evaluation/education, Compensatory technique education, Energy conservation          See flowsheet documentation for full assessment, interventions and recommendations  Outcome: Progressing  Note: Limitation: Decreased ADL status, Decreased cognition, Decreased endurance, Decreased self-care trans, Decreased high-level ADLs  Prognosis: Good  Assessment: Pt greeted bedside for OT treatment on 8/8/2022 focusing on maximizing independence with ADLs  Pt completes bed mobility with S, functional transfers with min A, and functional mobility with min AX1 with RW  Pt completes ADL routine seated in chair/staning: S with eating, mod A with LB bathing, and min A with LB dressing  Limitations that impact functional performance include decreased ADL status, decreased UE ROM, decreased UE strength, decreased safe judgement during ADLs, decreased cognition, decreased endurance, decreased self care transfers, decreased high level ADLs and pain  Occupational performance areas to address ADL retraining, functional transfer training, UE strengthening/ROM, endurance training, cognitive reorientation, Pt/caregiver education, equipment evaluation/education, compensatory technique education, energy conservation and activity engagement   Pt would benefit from continued skilled OT services while in hospital to maximize independence with ADLs  Will continue to follow Pt's goals and progress  Pt would benefit from post acute rehabilitation services upon DC to maximize safety and independence with ADLs and functional tasks of choice       OT Discharge Recommendation: Post acute rehabilitation services

## 2022-08-09 VITALS
OXYGEN SATURATION: 93 % | HEIGHT: 64 IN | TEMPERATURE: 99.3 F | DIASTOLIC BLOOD PRESSURE: 61 MMHG | WEIGHT: 131.4 LBS | SYSTOLIC BLOOD PRESSURE: 130 MMHG | BODY MASS INDEX: 22.43 KG/M2 | HEART RATE: 91 BPM | RESPIRATION RATE: 16 BRPM

## 2022-08-09 PROCEDURE — 99239 HOSP IP/OBS DSCHRG MGMT >30: CPT | Performed by: HOSPITALIST

## 2022-08-09 PROCEDURE — 99232 SBSQ HOSP IP/OBS MODERATE 35: CPT | Performed by: INTERNAL MEDICINE

## 2022-08-09 RX ORDER — CLONAZEPAM 1 MG/1
3 TABLET ORAL
Qty: 21 TABLET | Refills: 0 | Status: ON HOLD | OUTPATIENT
Start: 2022-08-09 | End: 2022-08-16

## 2022-08-09 RX ADMIN — LEVOTHYROXINE SODIUM 112 MCG: 112 TABLET ORAL at 06:15

## 2022-08-09 RX ADMIN — MIDODRINE HYDROCHLORIDE 5 MG: 5 TABLET ORAL at 06:15

## 2022-08-09 RX ADMIN — SUCRALFATE 1 G: 1 TABLET ORAL at 11:38

## 2022-08-09 RX ADMIN — HEPARIN SODIUM 5000 UNITS: 5000 INJECTION INTRAVENOUS; SUBCUTANEOUS at 06:15

## 2022-08-09 RX ADMIN — PANTOPRAZOLE SODIUM 40 MG: 40 TABLET, DELAYED RELEASE ORAL at 06:15

## 2022-08-09 RX ADMIN — MISOPROSTOL 200 MCG: 200 TABLET ORAL at 11:38

## 2022-08-09 RX ADMIN — MIDODRINE HYDROCHLORIDE 5 MG: 5 TABLET ORAL at 11:38

## 2022-08-09 RX ADMIN — MISOPROSTOL 200 MCG: 200 TABLET ORAL at 08:51

## 2022-08-09 RX ADMIN — SUCRALFATE 1 G: 1 TABLET ORAL at 06:15

## 2022-08-09 NOTE — PLAN OF CARE
Problem: INFECTION - ADULT  Goal: Absence or prevention of progression during hospitalization  Description: INTERVENTIONS:  - Assess and monitor for signs and symptoms of infection  - Monitor lab/diagnostic results  - Monitor all insertion sites, i e  indwelling lines, tubes, and drains  - Monitor endotracheal if appropriate and nasal secretions for changes in amount and color  - [place] appropriate cooling/warming therapies per order  - Administer medications as ordered  - Instruct and encourage patient and family to use good hand hygiene technique  - Identify and instruct in appropriate isolation precautions for identified infection/condition  Outcome: Adequate for Discharge     Problem: DISCHARGE PLANNING  Goal: Discharge to home or other facility with appropriate resources  Description: INTERVENTIONS:  - Identify barriers to discharge w/patient and caregiver  - Arrange for needed discharge resources and transportation as appropriate  - Identify discharge learning needs (meds, wound care, etc )  - Arrange for interpretive services to assist at discharge as needed  - Refer to Case Management Department for coordinating discharge planning if the patient needs post-hospital services based on physician/advanced practitioner order or complex needs related to functional status, cognitive ability, or social support system  Outcome: Adequate for Discharge     Problem: Prexisting or High Potential for Compromised Skin Integrity  Goal: Skin integrity is maintained or improved  Description: INTERVENTIONS:  - Identify patients at risk for skin breakdown  - Assess and monitor skin integrity  - Assess and monitor nutrition and hydration status  - Monitor labs   - Assess for incontinence   - Turn and reposition patient  - Assist with mobility/ambulation  - Relieve pressure over bony prominences  - Avoid friction and shearing  - Provide appropriate hygiene as needed including keeping skin clean and dry  - Evaluate need for skin moisturizer/barrier cream  - Collaborate with interdisciplinary team   - Patient/family teaching  - Consider wound care consult   Outcome: Adequate for Discharge     Problem: Nutrition/Hydration-ADULT  Goal: Nutrient/Hydration intake appropriate for improving, restoring or maintaining nutritional needs  Description: Monitor and assess patient's nutrition/hydration status for malnutrition  Collaborate with interdisciplinary team and initiate plan and interventions as ordered  Monitor patient's weight and dietary intake as ordered or per policy  Utilize nutrition screening tool and intervene as necessary  Determine patient's food preferences and provide high-protein, high-caloric foods as appropriate       INTERVENTIONS:  - Monitor oral intake, urinary output, labs, and treatment plans  - Assess nutrition and hydration status and recommend course of action  - Evaluate amount of meals eaten  - Assist patient with eating if necessary   - Allow adequate time for meals  - Recommend/ encourage appropriate diets, oral nutritional supplements, and vitamin/mineral supplements  - Order, calculate, and assess calorie counts as needed  - Recommend, monitor, and adjust tube feedings and TPN/PPN based on assessed needs  - Assess need for intravenous fluids  - Provide specific nutrition/hydration education as appropriate  - Include patient/family/caregiver in decisions related to nutrition  Outcome: Adequate for Discharge     Problem: METABOLIC, FLUID AND ELECTROLYTES - ADULT  Goal: Electrolytes maintained within normal limits  Description: INTERVENTIONS:  - Monitor labs and assess patient for signs and symptoms of electrolyte imbalances  - Administer electrolyte replacement as ordered  - Monitor response to electrolyte replacements, including repeat lab results as appropriate  - Instruct patient on fluid and nutrition as appropriate  Outcome: Adequate for Discharge     Problem: Knowledge Deficit  Goal: Patient/family/caregiver demonstrates understanding of disease process, treatment plan, medications, and discharge instructions  Description: Complete learning assessment and assess knowledge base    Interventions:  - Provide teaching at level of understanding  - Provide teaching via preferred learning methods  Outcome: Adequate for Discharge

## 2022-08-09 NOTE — CASE MANAGEMENT
Case Management Discharge Planning Note    Patient name Kalin Tim  Location [facility] 828/PPHP 205-14 MRN [id number]  : 1945 Date 2022       Current Admission Date: 2022  Current Admission Diagnosis:Septic shock [facility])   Patient Active Problem List    Diagnosis Date Noted    Fall 2022    Diarrhea 2022    Septic shock ([facility] ) 2022    Sacral ulcer ([facility] ) 2022    Gram-positive bacteremia 2022    Severe protein-calorie malnutrition ([facility] ) 2022    Bilateral leg edema 2022    Ambulatory dysfunction 2022    Acute blood loss anemia 2022    Gastric ulcer 2022    Fever 2022    Anemia 2022    Dyspnea on exertion 2022    Generalized weakness 2022    Hyponatremia 2022    Abnormal CT scan 2022    H/O bariatric surgery - bypass 2022    Cerumen debris on tympanic membrane of right ear 2022    Nasal congestion 2022    Bilateral hearing loss 2022    Fibromyalgia syndrome 2021    Osteopenia of both forearms 2021    Medicare annual wellness visit, subsequent 2021    Shortness of breath 2021    Acute bronchitis 2021    COVID-19 2021    Dysphasia 2020    Epigastric pain 2020    Hypothyroidism 2020    Gastroesophageal reflux disease without esophagitis 2020    Depression 2020    Fibromyalgia, primary 2020    Iron deficiency 2020    Numbness of foot 2020      LOS (days): 14  Geometric Mean LOS (GMLOS) (days): 4 80  Days to GMLOS:-9 6     OBJECTIVE:  Risk of Unplanned Readmission Score: 23 58         Current admission status: Inpatient   Preferred Pharmacy:   [facility] [address] [facility], [address] [place] [address] [address] [facility] [address]  Phone: [phone] Fax: [phone]    Primary Care Provider: Roberto Mensah MD    Primary Insurance: [facility] REP  Secondary Insurance:     DISCHARGE DETAILS:    Notified auth obtained by Jamil Wing requested via [employer] to [employer], PT and brother notified of transport time

## 2022-08-09 NOTE — DISCHARGE SUMMARY
[facility]  Discharge- Kamryn Booker 1945, 76 y o  female MRN: [id number]  [facility] Encounter: [id number]  Primary Care Provider: Samantha Herbert MD   Date and time admitted to hospital: 7/25/2022 10:04 PM     * Septic shock [facility])  Assessment & Plan  - presented with septic shock secondary to PICC line related bacteremia  - PICC line has since been removed  - initial improvement on IV cefazolin for treatment of bacteremia, however developed leukocytosis, hypotension, and fever on 07/30  Further workup revealed abnormal right upper quadrant ultrasound, though follow-up MRI negative for cholecystitis      - antibiotics discontinued by ID on 08/05  - has remained afebrile and asymptomatic since discontinuation of antibiotics  - right upper extremity PICC line replaced on 08/06 for reinitiation of TPN        Gram-positive bacteremia  Assessment & Plan  - MSSA bacteremia secondary to PICC line infection; had previously been on TPN  - PICC line has since been removed  - echocardiogram without vegetation  - repeat blood cultures have cleared     - completed full course of IV antibiotics while inpatient per ID recommendations  - monitor off antibiotics     H/O bariatric surgery - bypass  Assessment & Plan  - 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 at [facility]  - has had significant malnutrition and issues with anastomotic ulcerations  - recently seen by bariatric service during last hospitalization at [facility]  They recommended prolonged course of TPN for nutritional optimization with plans for revision of bypass in the future  She has an appointment with them on 08/20    We will replace PICC line and continue TPN until she can be seen by them at follow-up and surgical plans are solidified         Diarrhea  Assessment & Plan  - Ongoing diarrhea for the past several months  - Malabsorption     - continue C diff prophylaxis with p o  vancomycin 3 days post discontinuation of systemic antibiotics through 8/8     Fall  Assessment & Plan  - unwitnessed fall out of bed on 08/04  - trauma workup including CT of the head and cervical spine were unremarkable  - no other clear injuries or trauma     - fall precautions     Sacral ulcer ([facility] )  Assessment & Plan  - Sacral decubitus ulcer without evidence of cellulitis  - Continue with local care     Severe protein-calorie malnutrition ([facility] )  Assessment & Plan  Malnutrition Findings:   Adult Malnutrition type: Chronic illness  Adult Degree of Malnutrition: Other severe protein calorie malnutrition  Malnutrition Characteristics: Fat loss, Muscle loss, Inadequate energy                    360 Statement: Severe malnutrition in setting of chronic illness related to malabsorption and poor po intake as evidenced by overt loss of body fat and muscle mass observed (severe orbital wasting , severe trapezius wasting, severe quadricep wasting), and pt reported poor po intakes  Pt very  loquacious      BMI Findings:            Body mass index is 19 91 kg/m²       Patient on chronic TPN post remote gastric bypass  Currently TPN on hold  Continue with oral diet  Nutrition recommending resuming TPN once medically appropriate to me daily needs     Anemia  Assessment & Plan  - Anemia of chronic disease  - Status post blood transfusion 7/26 and 7/31  - cont to monitor     Depression  Assessment & Plan  - Continue home med of trazodone and Klonopin     Hypothyroidism  Assessment & Plan  - Continue levothyroxine        Discharging Physician / Practitioner: Idania Bridges DO  PCP: Lord Khushi MD  Admission Date:       Admission Orders (From admission, onward)              Ordered          07/26/22 0234   INPATIENT ADMISSION  Once                          Discharge Date: 08/09/22     Consultations During Hospital Stay:  · Infectious disease  · Critical care     Procedures Performed:   · PICC line removal  · PICC line placement     Significant Findings / Test Results:   · Septic shock secondary to Staph bacteremia from PICC line infection  · Chronic severe malnutrition     Incidental Findings:   · None     Test Results Pending at Discharge (will require follow up): · None     Outpatient Tests Requested:  · Patient will need routine follow-up lab work while on TPN     Complications:  none     Reason for Admission:  Septic shock     Hospital Course:   Maureen Maria is a 76 y o  female patient who originally presented to the hospital on 7/25/2022 due to fevers and chills  She was found to have staph bacteremia and septic shock  She was initially admitted to the ICU service and treated with broad-spectrum antibiotics  Ultimately who blood cultures grew MSSA and her PICC line was removed  She was treated with full course of IV antibiotics during her hospital stay, directed by Infectious Disease  She was monitor for 3 days off of antibiotics with no additional symptoms or fever  A PICC line was replaced for reinitiation of TPN which she tolerated fine  On discharge she has no and antibiotic needs  She will continue on TPN until she sees bariatric surgery on 08/20 for definitive surgical plans  Otherwise patient was feeling well at the time of discharge  All questions and concerns were addressed  Family aware of discharge plan      Please see above list of diagnoses and related plan for additional information       Condition at Discharge: good     Discharge Day Visit / Exam:   Subjective:  Patient seen examined on day of discharge  Feeling well today  Afebrile  No new complaints    Vitals: Blood Pressure: 96/53 (08/07/22 2322)  Pulse: 94 (08/07/22 2322)  Temperature: 98 °F (36 7 °C) (08/07/22 2322)  Temp Source: Oral (08/06/22 2316)  Respirations: (!) 24 (08/07/22 2322)  Height: 5' 4" (162 6 cm) (07/26/22 0715)  Weight - Scale: 61 2 kg (135 lb) (08/04/22 1452)  SpO2: 93 % (08/07/22 2322)     PHYSICAL EXAM:     Vitals signs reviewed  Constitutional   Awake and cooperative  NAD  Chronically ill-appearing but nontoxic  Malnourished  Head/Neck   Normocephalic  Atraumatic  HEENT   No scleral icterus  EOMI  Heart   Regular rate and rhythm  No murmers  Lungs   Clear to auscultation bilaterally  Respirations unlaboured  Abdomen   Soft  Nontender  Nondistended  Skin   Skin color normal  No rashes  Extremities   No deformities  No peripheral edema  Neuro   Alert and oriented  No new deficits  Psych   Mood stable  Affect normal             Discharge instructions/Information to patient and family:   See after visit summary for information provided to patient and family        Provisions for Follow-Up Care:  See after visit summary for information related to follow-up care and any pertinent home health orders  Disposition:   Philipp Ricardo Andrea at [address] [place] [address] Readmission: NO     Discharge Statement:  I spent 40 minutes discharging the patient  This time was spent on the day of discharge  I had direct contact with the patient on the day of discharge  Greater than 50% of the total time was spent examining patient, answering all patient questions, arranging and discussing plan of care with patient as well as directly providing post-discharge instructions    Additional time then spent on discharge activities      Discharge Medications:  See after visit summary for reconciled discharge medications provided to patient and/or family        **Please Note: This note may have been constructed using a voice recognition system**

## 2022-08-09 NOTE — PROGRESS NOTES
Progress Note - Infectious Disease   Grace Suárez 76 y o  female MRN: [id number]  [facility] Encounter: [id number]      Impression/Plan:  1  Septic shock, present on admission, likely secondary to PICC related bacteremia  [address] has been removed   Patient was clinically much improved,, off pressors and out of ICU   However, she had recurrent sepsis subsequently, felt to be secondary to aspiration pneumonia  Patient did well with a course of IV Zosyn  She remains well off it  Repeat blood cultures had no growth  Observe off further antibiotic  Monitor temperature/WBC  Monitor hemodynamics  Serial exams     2  MSSE bacteremia, most likely secondary to PICC line, with patient on TPN  [address] has been removed   2D echo without vegetation   Repeat blood culture obtained on 07/26 still has growth but this was drawn prior to PICC removal   Repeat blood cultures on 07/28 have no growth    Patient completed antibiotic course for this  No further antibiotic for this      3  NOAH, present on admission, most likely secondary to septic shock   Creatinine is much improved and now stabilized  Antibiotic at full dose  Recheck BMP     4  Small sacral decubitus ulcer, without evidence of cellulitis  Local ulcer care  Monitor      5  Gastric ulcer, stable on PPI      6  Severe protein calorie malnutrition, on outpatient TPN   Given septic shock of PICC origin, risks and benefits of continued TPN should be reassessed      7  Status post distant gastric bypass      8  C difficile infection versus colonization   With a white count coming dramatically down without any treatment and the patient's fever resolving, doubt this is the cause of the patient's most recent deterioration  She does have chronic diarrhea that is unchanged   PCR is positive but EIA is negative   Patient completed oral vancomycin prophylaxis  Monitor diarrhea      Discussed with patient in detail regarding the above plan    Okay for discharge from ID viewpoint      Antibiotics:  Off systemic antibiotic  Oral vancomycin prophylaxis     Subjective:  Patient is comfortable  No focal complaints  No dyspnea or cough  Temperature stays down  No chills  Stable mild chronic diarrhea        Objective:  Vitals:  Temp:  [98 1 °F (36 7 °C)-100 3 °F (37 9 °C)] 99 3 °F (37 4 °C)  HR:  [77-91] 91  Resp:  [16-22] 16  BP: (115-143)/(55-70) 130/61  SpO2:  [93 %-97 %] 93 %  Temp (24hrs), Av 2 °F (37 3 °C), Min:98 1 °F (36 7 °C), Max:100 3 °F (37 9 °C)  Current: Temperature: 99 3 °F (37 4 °C)    Physical Exam:     General: Awake, alert, cooperative, no distress  Neck:  Supple  No mass  No lymphadenopathy  Lungs: Expansion symmetric, no rales, no wheezing, respirations unlabored  Heart:  Regular rate and rhythm, S1 and S2 normal, no murmur  Abdomen: Soft, nondistended, non-tender, bowel sounds active all four quadrants, no masses, no organomegaly  Extremities: No edema  No erythema/warmth  No ulcer  Nontender to palpation  Skin:  No rash  Neuro: Moves all extremities  Invasive Devices  Report    Peripherally Inserted Central Catheter Line  Duration           PICC Line 22 Right Brachial 3 days          Drain  Duration           External Urinary Catheter 2 days                Labs studies:   I have personally reviewed pertinent labs  Results from last 7 days   Lab Units 22  0542 22  0509 22  0629 22  0604 22  0620   POTASSIUM mmol/L 3 8 4 0 3 0*   < > 3 3*   CHLORIDE mmol/L 105 109* 114*   < > 114*   CO2 mmol/L 25 24 20*   < > 18*   BUN mg/dL 10 9 9   < > 10   CREATININE mg/dL 1 12 1 10 1 17   < > 1 19   EGFR ml/min/1 73sq m 46 47 44   < > 43   CALCIUM mg/dL 8 6 8 6 8 1*   < > 8 2*   AST U/L 17 16  --   --  25   ALT U/L 6* 7*  --   --  7*   ALK PHOS U/L 82 89  --   --  103    < > = values in this interval not displayed       Results from last 7 days   Lab Units 22  0604 22  0620   WBC Thousand/uL 13 63* 9  82   HEMOGLOBIN g/dL 8 6* 8 2*   PLATELETS Thousands/uL 447* 452*           Imaging Studies:   I have personally reviewed pertinent imaging study reports and images in PACS  EKG, Pathology, and Other Studies:   I have personally reviewed pertinent reports

## 2022-08-10 ENCOUNTER — TELEPHONE (OUTPATIENT)
Dept: OTHER | Facility: OTHER | Age: 77
End: 2022-08-10

## 2022-08-10 NOTE — TELEPHONE ENCOUNTER
Patient is calling regarding cancelling an appointment      Date/Time: 08/10 @10:45    Patient was rescheduled: YES [] NO [x]    Patient requesting call back to reschedule: YES [] NO [x]    Sister of patient called to cancel due to unforeseen circumstances and is requesting a call back to coordinate another time and date

## 2022-08-10 NOTE — UTILIZATION REVIEW
Notification of Discharge   This is a Notification of Discharge from our facility [address]  Please be advised that this patient has been discharge from our facility  Below you will find the admission and discharge date and time including the patients disposition  UTILIZATION REVIEW CONTACT:  Lilian Alvarez  Utilization   Network Utilization Review Department  Phone: [phone] x carefully listen to the prompts  All voicemails are confidential   Email: [email or web address]     PHYSICIAN ADVISORY SERVICES:  FOR FUPE-IS-XQIV REVIEW - MEDICAL NECESSITY DENIAL  Phone: [phone]  Fax: [phone]  Email: [email or web address]     PRESENTATION DATE: 7/25/2022 10:04 PM  OBERVATION ADMISSION DATE:   INPATIENT ADMISSION DATE: 7/26/22  2:34 AM   DISCHARGE DATE: 8/9/2022  2:43 PM  DISPOSITION: Non [facility] SN[facility]/TC[facility]/SNU Non [facility] SNF/TCU/SNU      IMPORTANT INFORMATION:  Send all requests for admission clinical reviews, approved or denied determinations and any other requests to dedicated fax number below belonging to the campus where the patient is receiving treatment   List of dedicated fax numbers:  [address] DENIALS (Administrative/Medical Necessity) [phone]   1000  [facility] (Maternity/N[facility]/Pediatrics) [phone]   [place] [phone]   130 [facility] [phone]   [address] [place] [address] [phone]   [address] [phone]   [facility]  [phone]   [address] [facility] [address] [facility]  2401 [facility][note 3] 1000 [facility] [phone]

## 2022-08-22 LAB — FUNGAL BLOOD CULTURE: NORMAL

## 2022-08-29 LAB — FUNGAL BLOOD CULTURE: NORMAL

## 2022-08-31 PROBLEM — K52.9 COLITIS PRESUMED TO BE DUE TO INFECTION: Status: ACTIVE | Noted: 2022-08-31

## 2022-08-31 PROBLEM — N30.00 ACUTE CYSTITIS WITHOUT HEMATURIA: Status: ACTIVE | Noted: 2022-08-31

## 2022-08-31 PROBLEM — N28.9 ACUTE KIDNEY INSUFFICIENCY: Status: ACTIVE | Noted: 2022-08-31

## 2022-08-31 NOTE — CASE MANAGEMENT
Case Management Assessment & Discharge Planning Note    Patient name Agapito Pennington  Location [facility] 759-26 MRN [id number]  : 1945 Date 2022       Current Admission Date: 2022  Current Admission Diagnosis:Colitis presumed to be due to infection   Patient Active Problem List    Diagnosis Date Noted   • Colitis presumed to be due to infection 2022   • Acute cystitis without hematuria 2022   • Acute kidney insufficiency 2022   • Fall 2022   • Diarrhea 2022   • Sepsis ([facility] [address] ) 2022   • Sacral ulcer ([facility] [address] ) 2022   • Gram-positive bacteremia 2022   • Severe protein-calorie malnutrition ([facility] [address] ) 2022   • Bilateral leg edema 2022   • Ambulatory dysfunction 2022   • Acute blood loss anemia 2022   • Gastric ulcer 2022   • Fever 2022   • Anemia 2022   • Dyspnea on exertion 2022   • Generalized weakness 2022   • Elevated LFTs 2022   • Hyponatremia 2022   • Abnormal CT scan 2022   • H/O bariatric surgery - bypass 2022   • Cerumen debris on tympanic membrane of right ear 2022   • Nasal congestion 2022   • Bilateral hearing loss 2022   • Fibromyalgia syndrome 2021   • Osteopenia of both forearms 2021   • Medicare annual wellness visit, subsequent 2021   • Shortness of breath 2021   • Acute bronchitis 2021   • COVID-19 2021   • Dysphasia 2020   • Epigastric pain 2020   • Hypothyroidism 2020   • Gastroesophageal reflux disease without esophagitis 2020   • Depression 2020   • Fibromyalgia, primary 2020   • Iron deficiency 2020   • Numbness of foot 2020      LOS (days): 0  Geometric Mean LOS (GMLOS) (days): 2 90  Days to GMLOS:2 6     OBJECTIVE:  PATIENT READMITTED TO HOSPITAL  Risk of Unplanned Readmission Score: 27 75         Current admission status: Inpatient  Referral Reason: Other    Preferred Pharmacy:   [facility] [address] [facility] [address]  Phone: [phone] Fax: [phone]    [facility], [address] [facility] [address]  Phone: [phone] Fax: [phone]    Primary Care Provider: Sean Perez MD    Primary Insurance: [facility]  Secondary Insurance:     ASSESSMENT:  Georgina 26 Proxies    There are no active Health Care Proxies on file  Readmission Root Cause  30 Day Readmission: Yes  Who directed you to return to the hospital?: Other (comment) ([facility])  Did you understand whom to contact if you had questions or problems?: Yes  Did you get your prescriptions before you left the hospital?: No  Reason[de-identified] Preference for own pharmacy  Were you able to get your prescriptions filled when you left the hospital?: Yes  Did you take your medications as prescribed?: Yes  Were you able to get to your follow-up appointments?: Yes  During previous admission, was a post-acute recommendation made?: Yes  What post-acute resources were offered?: STR  Patient was readmitted due to: Colitis presumed to be due to infection  Action Plan: IV ABX    Patient Information  Admitted from[de-identified] Facility  Mental Status: Alert  During Assessment patient was accompanied by: Not accompanied during assessment  Assessment information provided by[de-identified] Sister  Primary Caregiver: Other (Comment)  Caregiver's Name[de-identified] Nikko Amabile Relationship to Patient[de-identified] Other (Specify)  Support Systems: Friend Marcieone[address] 44 of Residence: [address] do you live in?: [facility] entry access options   Select all that apply : No steps to enter home  Type of Current Residence: Facility  Upon entering residence, is there a bedroom on the main floor (no further steps)?: Yes  Upon entering residence, is there a bathroom on the main floor (no further steps)?: Yes  In the last 12 months, was there a time when you were not able to pay the mortgage or rent on time?: No  In the last 12 months, how many places have you lived?: 2  In the last 12 months, was there a time when you did not have a steady place to sleep or slept in a shelter (including now)?: No  Homeless/housing insecurity resource given?: No  Living Arrangements: Other (Comment)    Activities of Daily Living Prior to Admission  Functional Status: Total dependent  Completes ADLs independently?: No  Level of ADL dependence: Total Dependent  Ambulates independently?: No  Level of ambulatory dependence:  Total Dependent  Does patient use assisted devices?: No  Does patient currently own DME?: No  Does patient have a history of Outpatient Therapy (PT/OT)?: No  Does the patient have a history of Short-Term Rehab?: Yes ([address])  Does patient have a history of HHC?: No  Does patient currently have ComptTIA?: No     Patient Information Continued  Income Source: Pension/FCI  Does patient have prescription coverage?: Yes  Within the past 12 months, you worried that your food would run out before you got the money to buy more : Never true  Within the past 12 months, the food you bought just didn't last and you didn't have money to get more : Never true  Food insecurity resource given?: No  Does patient receive dialysis treatments?: No  Does patient have a history of substance abuse?: No  Does patient have a history of Mental Health Diagnosis?: Yes (depression, anxiety)  Is patient receiving treatment for mental health?: Yes  Has patient received inpatient treatment related to mental health in the last 2 years?: No    Means of Transportation  In the past 12 months, has lack of transportation kept you from medical appointments or from getting medications?: No  In the past 12 months, has lack of transportation kept you from meetings, work, or from getting things needed for daily living?: No  Was application for public transport provided?: No  DISCHARGE DETAILS:    Discharge planning discussed with[de-identified] Zandra  Freedom of Choice: Yes  Comments - Freedom of Choice: John Ceron states that she is not pt's POA but the family has not been happy with pt's care at [facility] 2021, would likely like a different placement  Gerhardt Grams is POA  CM contacted family/caregiver?: Yes        Contacts  Patient Contacts: John Ceron  Relationship to Patient[de-identified] Family  Contact Method: Phone  Phone Number: [phone]  Reason/Outcome: Continuity of Care, Discharge Planning, Emergency Contact     Additional Comments: CM reached out to pt's sister, John Ceron as pt was not avialable to speak with KEITH Lopez Car states that pt has been in and out of the hospital and to her understanding, pt has gotten several infections while at [facility] 2021  John Ceron states that she is not POA, their brother in law, Gerhardt Grams is POA but the family has not been happy with her care and will likely want to discuss different arrangements  John Ceron states that pt will not be able to return home after STR, pt will need LTC  Lopez Car states Bhargavi Sotelo has been speaking with [facility] about potential LTC options  CM reached out to Niecy from [facility] who confirmed pt is from 2021 and that she is not a bed hold, if returning a referral and new auth would be needed

## 2022-08-31 NOTE — ED ATTENDING ATTESTATION
8/30/2022  IRohan MD, saw and evaluated the patient  I have discussed the patient with the resident/non-physician practitioner and agree with the resident's/non-physician practitioner's findings, Plan of Care, and MDM as documented in the resident's/non-physician practitioner's note, except where noted  All available labs and Radiology studies were reviewed  I was present for key portions of any procedure(s) performed by the resident/non-physician practitioner and I was immediately available to provide assistance  At this point I agree with the current assessment done in the Emergency Department  I have conducted an independent evaluation of this patient a history and physical is as follows:    ED Course  ED Course as of 08/31/22 0210   Wed Aug 31, 2022   0126 Per resident h&p 75 YO F presents for persistent diarrhea; recent CDI; fevers noted on PE; PE: stage 2 sacral decub not infected abdominal cramping; I/P septic w/u; stool for CDI; CTAP; cefepime     Emergency Department Note- Twila Ramirez 76 y o  female MRN: [id number]    Unit/Bed#: ED 01 Encounter: [id number]    Twila Ramirez is a 76 y o  female who presents with   Chief Complaint   Patient presents with   • Fatigue     Patient from [address]  [place] reports increased lethargy over the last couple of days with diarrhea  Patient is awake, alert, and oriented x4  Pt does report having diarrhea  Fever of 100 6 for EMS         History of Present Illness   HPI:  Twila Ramirez is a 76 y o  female who presents for evaluation of: Worsening lethargy over the last several days with persistent diarrhea  The patient has a recent history of C difficile infection about a month ago  The patient feels poorly but is unable to specify where she feels poorly  She denies headache, neck pain, dyspnea, cough, chest pain    She does note some diffuse, crampy, abdominal discomfort; abdominal discomfort is diffuse, mild in intensity, and not associated with nausea but is associated with diarrhea  Review of Systems   Constitutional: Positive for chills and fatigue  HENT: Negative for congestion and rhinorrhea  Respiratory: Negative for cough and shortness of breath  Cardiovascular: Negative for chest pain and palpitations  Gastrointestinal: Positive for abdominal pain and diarrhea  Negative for abdominal distention, nausea and vomiting  Neurological: Negative for light-headedness and headaches  All other systems reviewed and are negative        Historical Information   Past Medical History:   Diagnosis Date   • Anemia    • Anxiety    • Bipolar disorder ([address] )    • Colon polyp    • Disease of thyroid gland    • Essential hypertension    • Essential tremor    • Fibromyalgia, primary    • GERD (gastroesophageal reflux disease)    • Inflammatory polyarthropathy (HCC)    • Mammogram abnormal    • Pressure injury of skin      Past Surgical History:   Procedure Laterality Date   • BREAST BIOPSY Right 2015    benign   • CARPAL TUNNEL RELEASE Bilateral    • COLONOSCOPY     • EGD AND COLONOSCOPY  2014   • GASTRIC BYPASS  2012   • MAMMO NEEDLE LOCALIZATION RIGHT (ALL INC) Right 2012   • MAMMO NEEDLE LOCALIZATION RIGHT (ALL INC) Right 2012   • ULNAR NERVE TRANSPOSITION Right      Social History   Social History     Substance and Sexual Activity   Alcohol Use Yes   • Alcohol/week: 4 0 standard drinks   • Types: 4 Glasses of wine per week    Comment: rare     Social History     Substance and Sexual Activity   Drug Use Never     Social History     Tobacco Use   Smoking Status Former Smoker   • Quit date:    • Years since quittin 6   Smokeless Tobacco Never Used     Family History:   Family History   Problem Relation Age of Onset   • No Known Problems Mother    • No Known Problems Father    • No Known Problems Sister    • No Known Problems Maternal Grandmother    • No Known Problems Maternal Grandfather    • No Known Problems Paternal Grandmother    • No Known Problems Paternal Grandfather    • No Known Problems Sister    • No Known Problems Sister    • Stomach cancer Maternal Aunt    • No Known Problems Maternal Aunt    • No Known Problems Maternal Aunt    • No Known Problems Maternal Aunt    • No Known Problems Paternal Aunt    • Leukemia Other        Meds/Allergies   PTA meds:   Prior to Admission Medications   Prescriptions Last Dose Informant Patient Reported? Taking?    clonazePAM (KlonoPIN) 1 mg tablet   No No   Sig: Take 3 tablets (3 mg total) by mouth daily at bedtime for 7 doses   folic acid (FOLVITE) 1 mg tablet   No No   Sig: Take 1 tablet (1 mg total) by mouth daily   levothyroxine 112 mcg tablet  Self Yes No   Sig: TK 1 T PO QD   midodrine (PROAMATINE) 5 mg tablet   No No   Sig: Take 1 tablet (5 mg total) by mouth 3 (three) times a day before meals   sucralfate (CARAFATE) 1 g tablet   No No   Sig: Take 1 tablet (1 g total) by mouth 4 (four) times a day (before meals and at bedtime)   Patient not taking: Reported on 6/16/2022   thiamine (VITAMIN B1) 100 mg tablet   No No   Sig: Take 1 tablet (100 mg total) by mouth daily   traZODone (DESYREL) 50 mg tablet  Self Yes No   Sig: Take 150 mg by mouth daily at bedtime      Facility-Administered Medications: None     No Known Allergies    Objective   First Vitals:   Blood Pressure: 116/56 (08/30/22 2355)  Pulse: 101 (08/30/22 2355)  Temperature: 99 6 °F (37 6 °C) (08/30/22 2355)  Temp Source: Oral (08/30/22 2355)  Respirations: 20 (08/30/22 2355)  SpO2: 99 % (08/30/22 2355)    Current Vitals:   Blood Pressure: 116/56 (08/30/22 2355)  Pulse: 101 (08/30/22 2355)  Temperature: (!) 101 4 °F (38 6 °C) (08/31/22 0118)  Temp Source: Rectal (08/31/22 0118)  Respirations: 20 (08/30/22 2355)  SpO2: 99 % (08/30/22 2355)    No intake or output data in the 24 hours ending 08/31/22 0213    Invasive Devices  Report    Peripherally Inserted Central Catheter Line  Duration           PICC Line 22 Right Brachial 24 days          Peripheral Intravenous Line  Duration           Peripheral IV 22 Left Hand <1 day          Drain  Duration           External Urinary Catheter 24 days                Physical Exam  Vitals and nursing note reviewed  Constitutional:       General: She is not in acute distress  Appearance: She is cachectic  HENT:      Head: Normocephalic and atraumatic  Right Ear: External ear normal       Left Ear: External ear normal       Nose: Nose normal       Mouth/Throat:      Pharynx: No oropharyngeal exudate  Eyes:      Conjunctiva/sclera: Conjunctivae normal       Pupils: Pupils are equal, round, and reactive to light  Cardiovascular:      Rate and Rhythm: Normal rate and regular rhythm  Pulmonary:      Effort: Pulmonary effort is normal  No respiratory distress  Abdominal:      General: Abdomen is flat  There is no distension  Palpations: Abdomen is soft  Musculoskeletal:         General: No deformity  Normal range of motion  Cervical back: Normal range of motion and neck supple  Skin:     General: Skin is warm and dry  Capillary Refill: Capillary refill takes less than 2 seconds  Neurological:      General: No focal deficit present  Mental Status: She is alert and oriented to person, place, and time  Mental status is at baseline  Coordination: Coordination normal    Psychiatric:         Mood and Affect: Mood normal          Behavior: Behavior normal          Thought Content: Thought content normal          Judgment: Judgment normal            Medical Decision Makin  Fevers, diarrhea, asthenia:  Plan septic workup; screen for C diff infection; cefepime IV  No results found for this or any previous visit (from the past 36 hour(s))  CT abdomen pelvis with contrast    (Results Pending)         Portions of the record may have been created with voice recognition software   Occasional wrong word or "sound a like" substitutions may have occurred due to the inherent limitations of voice recognition software  Read the chart carefully and recognize, using context, where substitutions have occurred  Critical Care Time  CriticalCare Time  Performed by: Jostin Alberto MD  Authorized by: Jostin Alberto MD     Critical care provider statement:     Critical care time (minutes):  32    Critical care time was exclusive of:  Separately billable procedures and treating other patients and teaching time    Critical care was necessary to treat or prevent imminent or life-threatening deterioration of the following conditions:  Sepsis    Critical care was time spent personally by me on the following activities:  Obtaining history from patient or surrogate, development of treatment plan with patient or surrogate, discussions with consultants, evaluation of patient's response to treatment, ordering and performing treatments and interventions, ordering and review of laboratory studies, ordering and review of radiographic studies, re-evaluation of patient's condition and review of old charts    I assumed direction of critical care for this patient from another provider in my specialty: no    Comments:      Sepsis workup initiated; cefepime 1 g IV; screen for C difficile infection; plan to initiate oral vancomycin

## 2022-08-31 NOTE — ASSESSMENT & PLAN NOTE
Two day history of diarrhea in the setting of recent antibiotic use for bacteremia  Possibility of C difficile infection and colitis is being entertained; C difficile study ordered  Started on vancomycin oral   Infectious disease consult  Gastroenterology consult    Recheck CBC with differential

## 2022-08-31 NOTE — ASSESSMENT & PLAN NOTE
· Exacerbated by diarrhea/dehydration however patient is chronically malnourished     · PT/OT evaluations

## 2022-08-31 NOTE — ASSESSMENT & PLAN NOTE
Patient with recent admission for septic shock secondary to PICC related bacteremia  PICC was removed and patient was treated with antibiotics  Had recurrent sepsis felt to be secondary to aspiration pneumonia and completed additional intravenous antibiotic course for this  At this time, she was noted to have positive C diff, unclear if colonization versus infection  PCR was positive but EIA was negative  Completed course of oral vancomycin prophylaxis  Now presents with 2 day history of severe diarrhea  Of note, does have diarrhea at baseline  · CT A/P shows Diffuse wall thickening of the colon with surrounding fat stranding suspicious for colitis i e  infectious or inflammatory  Mild circumferential wall thickening of the urinary bladder  Correlate with urinalysis for possible cystitis  · Continue oral Vancomycin  · Add Banatrol supplements   · Limit abx as able, however treating for possible UTI  · ID consult  · Rectal tube for now given small decubitus ulcer and concern for wound contamination and further breakdown  Would only continue for short term  · Supportive care  · PT/OT evaluations  Was at [facility] for rehab, was possibly transitioning to long term care

## 2022-08-31 NOTE — ASSESSMENT & PLAN NOTE
Patient with history of gastric bypass surgery  Has had significant malnutrition issues and anastomotic ulcerations  Seen by Bariatric Service during recent hospitalization at [facility] who recommended prolonged course of TPN for nutritional optimization and possible revision surgery in the future  · Had an appointment 8/20 with bariatric surgery however this was canceled, will need to be rescheduled  · Currently has PICC line in place, will order TPN

## 2022-08-31 NOTE — PLAN OF CARE
Problem: Potential for Falls  Goal: Patient will remain free of falls  Description: INTERVENTIONS:  - Educate patient/family on patient safety including physical limitations  - Instruct patient to call for assistance with activity   - Consult OT/PT to assist with strengthening/mobility   - Keep Call bell within reach  - Keep bed low and locked with side rails adjusted as appropriate  - Keep care items and personal belongings within reach  - Initiate and maintain comfort rounds  - Make Fall Risk Sign visible to staff  - Offer Toileting every 2 Hours, in advance of need  - Initiate/Maintain bed alarm  - Obtain necessary fall risk management equipment  - Apply yellow socks and bracelet for high fall risk patients  - Consider moving patient to room near nurses station  Outcome: Progressing     Problem: MOBILITY - ADULT  Goal: Maintain or return to baseline ADL function  Description: INTERVENTIONS:  -  Assess patient's ability to carry out ADLs; assess patient's baseline for ADL function and identify physical deficits which impact ability to perform ADLs (bathing, care of mouth/teeth, toileting, grooming, dressing, etc )  - Assess/evaluate cause of self-care deficits   - Assess range of motion  - Assess patient's mobility; develop plan if impaired  - Assess patient's need for assistive devices and provide as appropriate  - Encourage maximum independence but intervene and supervise when necessary  - Involve family in performance of ADLs  - Assess for home care needs following discharge   - Consider OT consult to assist with ADL evaluation and planning for discharge  - Provide patient education as appropriate  Outcome: Progressing  Goal: Maintains/Returns to pre admission functional level  Description: INTERVENTIONS:  - Perform BMAT or MOVE assessment daily    - Set and communicate daily mobility goal to care team and patient/family/caregiver     - Collaborate with rehabilitation services on mobility goals if consulted  - Perform Range of Motion 4 times a day  - Reposition patient every 2 hours    - Dangle patient 4 times a day  - Stand patient 3 times a day  - Ambulate patient 3 times a day  - Out of bed to chair 3 times a day   - Out of bed for meals 3 times a day  - Out of bed for toileting  - Record patient progress and toleration of activity level   Outcome: Progressing     Problem: INFECTION - ADULT  Goal: Absence or prevention of progression during hospitalization  Description: INTERVENTIONS:  - Assess and monitor for signs and symptoms of infection  - Monitor lab/diagnostic results  - Monitor all insertion sites, i e  indwelling lines, tubes, and drains  - Monitor endotracheal if appropriate and nasal secretions for changes in amount and color  - [place] appropriate cooling/warming therapies per order  - Administer medications as ordered  - Instruct and encourage patient and family to use good hand hygiene technique  - Identify and instruct in appropriate isolation precautions for identified infection/condition  Outcome: Progressing  Goal: Absence of fever/infection during neutropenic period  Description: INTERVENTIONS:  - Monitor WBC    Outcome: Progressing     Problem: SAFETY ADULT  Goal: Patient will remain free of falls  Description: INTERVENTIONS:  - Educate patient/family on patient safety including physical limitations  - Instruct patient to call for assistance with activity   - Consult OT/PT to assist with strengthening/mobility   - Keep Call bell within reach  - Keep bed low and locked with side rails adjusted as appropriate  - Keep care items and personal belongings within reach  - Initiate and maintain comfort rounds  - Make Fall Risk Sign visible to staff  - Offer Toileting every 2 Hours, in advance of need  - Initiate/Maintain bed alarm  - Obtain necessary fall risk management equipment  - Apply yellow socks and bracelet for high fall risk patients  - Consider moving patient to room near nurses station  Outcome: Progressing  Goal: Maintain or return to baseline ADL function  Description: INTERVENTIONS:  -  Assess patient's ability to carry out ADLs; assess patient's baseline for ADL function and identify physical deficits which impact ability to perform ADLs (bathing, care of mouth/teeth, toileting, grooming, dressing, etc )  - Assess/evaluate cause of self-care deficits   - Assess range of motion  - Assess patient's mobility; develop plan if impaired  - Assess patient's need for assistive devices and provide as appropriate  - Encourage maximum independence but intervene and supervise when necessary  - Involve family in performance of ADLs  - Assess for home care needs following discharge   - Consider OT consult to assist with ADL evaluation and planning for discharge  - Provide patient education as appropriate  Outcome: Progressing  Goal: Maintains/Returns to pre admission functional level  Description: INTERVENTIONS:  - Perform BMAT or MOVE assessment daily    - Set and communicate daily mobility goal to care team and patient/family/caregiver  - Collaborate with rehabilitation services on mobility goals if consulted  - Perform Range of Motion 4 times a day  - Reposition patient every 2 hours    - Dangle patient 4 times a day  - Stand patient 3 times a day  - Ambulate patient 3 times a day  - Out of bed to chair 3 times a day   - Out of bed for meals 3 times a day  - Out of bed for toileting  - Record patient progress and toleration of activity level   Outcome: Progressing     Problem: DISCHARGE PLANNING  Goal: Discharge to home or other facility with appropriate resources  Description: INTERVENTIONS:  - Identify barriers to discharge w/patient and caregiver  - Arrange for needed discharge resources and transportation as appropriate  - Identify discharge learning needs (meds, wound care, etc )  - Arrange for interpretive services to assist at discharge as needed  - Refer to Case Management Department for coordinating discharge planning if the patient needs post-hospital services based on physician/advanced practitioner order or complex needs related to functional status, cognitive ability, or social support system  Outcome: Progressing     Problem: Knowledge Deficit  Goal: Patient/family/caregiver demonstrates understanding of disease process, treatment plan, medications, and discharge instructions  Description: Complete learning assessment and assess knowledge base    Interventions:  - Provide teaching at level of understanding  - Provide teaching via preferred learning methods  Outcome: Progressing     Problem: SKIN/TISSUE INTEGRITY - ADULT  Goal: Skin Integrity remains intact(Skin Breakdown Prevention)  Description: Assess:  -Perform Gabe assessment   -Clean and moisturize skin   -Inspect skin when repositioning, toileting, and assisting with ADLS  -Assess under medical devices   -Assess extremities for adequate circulation and sensation     Bed Management:  -Have minimal linens on bed & keep smooth, unwrinkled  -Change linens as needed when moist or perspiring  -Avoid sitting or lying in one position for more than 2 hours while in bed  -Keep HOB at 30degrees     Toileting:  -Offer bedside commode  -Assess for incontinence  -Use incontinent care products after each incontinent episode    Activity:  -Mobilize patient 4 times a day  -Encourage activity and walks on unit  -Encourage or provide ROM exercises   -Turn and reposition patient every 2 Hours  -Use appropriate equipment to lift or move patient in bed  -Instruct/ Assist with weight shifting every hour when out of bed in chair    Skin Care:  -Avoid use of baby powder, tape, friction and shearing, hot water or constrictive clothing  -Relieve pressure over bony prominences   -Do not massage red bony areas    Next Steps:  -Teach patient strategies to minimize risks    -Consider consults to  interdisciplinary teams  Outcome: Progressing  Goal: Incision(s), wounds(s) or drain site(s) healing without S/S of infection  Description: INTERVENTIONS  - Assess and document dressing, incision, wound bed, drain sites and surrounding tissue  - Provide patient and family education  - Perform skin care/dressing changes  Outcome: Progressing  Goal: Pressure injury heals and does not worsen  Description: Interventions:  - Implement low air loss mattress or specialty surface (Criteria met)  - Apply silicone foam dressing  - Instruct/assist with weight shifting every 30 minutes when in chair   - Limit chair time to 1 hour intervals  - Use special pressure reducing interventions such as waffle cushion when in chair   - Apply fecal or urinary incontinence containment device   - Perform passive or active ROM   - Turn and reposition patient & offload bony prominences   - Utilize friction reducing device or surface for transfers   - Consider consults to  interdisciplinary teams  - Use incontinent care products after each incontinent episode  - Consider nutrition services referral as needed  Outcome: Progressing

## 2022-08-31 NOTE — CONSULTS
Consultation - Infectious Disease   Bora Ramsay 76 y o  female MRN: [id number]  Unit/Bed#: ED 01 Encounter: [id number]      IMPRESSION & RECOMMENDATIONS:   1  Sepsis  Pt presenting with significant leukocytosis to 31 and fever to 101 4  She was recently admitted 1 month ago for PICC line infection with Staph epidermidis bacteremia, treated with IV cefazolin  She has long-standing PICC requirement due to #6  Source for sepsis #2 vs #3 vs recurrent bacteremia from PICC line  Pt now afebrile but continues to be tachypnic with soft BP's mainly in 100's/40-50's  · Continue ceftriaxone and PO vancomycin, as below in #2 and #3  · F/u BCx   · Continue to monitor fever curve, vitals, and WBC     2  Colitis  Presented with 2 days of diarrhea  During last admission 1 month ago pt was found to have positive C diff PCR but negative EIA, indicating colonization rather than infection  She was treated with prophylactic PO vancomycin at that time  CT A/P this admission revealing diffuse colonic thickening suggestive of colitis  C diff this admission again with positive PCR but negative EIA, indicating colonization rather than acute infection  · Continue PO vancomycin at 125mg daily for prophylaxis given colonization, continue for duration of other antibiotic use and for 1 week after systemic antibiotics stopped   · Consider workup for other causes of infectious and non-infectous colitis if diarrhea is voluminous and persistent or bloody     3  Cystitis  CT A/P also showing bladder wall thickening suggestive of cystitis  UA with innumerable RBCs, WBCs, and bacteria  Pt does endorse dysuria PTA  UCx pending  · Continue antibiotics with ceftriaxone for now, will alter as indicated pending UCx  · F/u UCx  · Continue to monitor fever curve, vitals, and WBC     4  NOAH  Cr on presentation 1 35, likely pre-renal etiology in the setting of diarrhea and dehydration  Continue IVF resuscitation   Avoid nephrotoxic agents and hypotension  Continue to monitor  Will dose adjust antibiotics as indicated  5  Elevated LFT's  No h/o liver disease  Labs from earlier this year show occasional elevation in ALP but AST and ALT mostly wnl  CMP on arrival: , , , albumin 1 7, INR 1 21  CT A/P also showing cholelithiasis with no inflammatory changes  Pt does have h/o RUQ pain and gallbladder inflammation, per chart review  Prior LFT abnormalities have been more cholestatic pattern compared to this more hepatocellular/ mixed pattern  Hepatitis panel negative  Other etiology could include shock liver in the setting of sepsis  6  TPN use  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  Pt presented to [facility] earlier this year with upper GI bleeding, found to have marginal ulcer bleeding on endoscopy  Started on TPN to maintain nutrition until bariatric surgery follow up, which was supposed to occur last week but it appears that pt cancelled appointment  She does have PICC in place with no surrounding signs of infection  Have discussed the above management plan in detail with the primary service    Extensive review of the medical records in epic including review of the notes, radiographs, and laboratory results     HISTORY OF PRESENT ILLNESS:  Reason for Consult: infectious colitis  HPI: Jaquelin Rhodes is a 76y o  year old female with PMH of bipolar d/o, depression, HTN, s/p Savage-en-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 ago (recently comlicated by bleeding marginal ulcers and requirement of TPN to maintain nutrition), GERD, hypothyroidism, sacral ulcer, and DESHAUN  She presented on 8/31 with increased lethargy, weakness, mild abdominal pain, and diarrhea for the last 2 days  She was recently admitted a month ago for PICC-related Staph epidermidis bacteremia (2/2 BCx positive), treated with IV cefazolin  She has also been on TPN   She also has recent h/o C diff infection vs colonization (PCR positive but EIA negative) on 7/31/22, treated with prophylactic course of PO vancomycin  On arrival to the ED she had a leukocytosis to 31 and fever to 101 4 but was otherwise hemodynamically stable  Procal elevated at 0 54, COVID neg, LA wnl  UA revealed innumerable RBCs, WBCs, and bacteria; UCx pending  She also had NOAH on arrival with Cr 1 35 and elevated LFT's (, , , albumin 1 7, INR 1 21)  C diff PCR collected and pending  BCx x2 also collected and pending  CT abdomen revealed diffuse colitis as well as possible cystitis and cholelithiasis  She was started on IV cefepime and PO vancomycin  Pt seen today  She is A&O to person, place, and time, but has circumferential speech and appears confused in conversation  She has no major complaints today  She does endorse worsening of her diarrhea lately, as well as dysuria  REVIEW OF SYSTEMS:  A complete review of systems is negative other than that noted in the HPI      PAST MEDICAL HISTORY:  Past Medical History:   Diagnosis Date   • Anemia    • Anxiety    • Bipolar disorder ([facility] [address] )    • Colon polyp    • Disease of thyroid gland    • Essential hypertension    • Essential tremor    • Fibromyalgia, primary    • GERD (gastroesophageal reflux disease)    • Inflammatory polyarthropathy (HCC)    • Mammogram abnormal    • Pressure injury of skin      Past Surgical History:   Procedure Laterality Date   • BREAST BIOPSY Right 2015    benign   • CARPAL TUNNEL RELEASE Bilateral    • COLONOSCOPY     • EGD AND COLONOSCOPY  01/01/2014   • GASTRIC BYPASS  07/01/2012   • MAMMO NEEDLE LOCALIZATION RIGHT (ALL INC) Right 2/6/2012   • MAMMO NEEDLE LOCALIZATION RIGHT (ALL INC) Right 2/6/2012   • ULNAR NERVE TRANSPOSITION Right        FAMILY HISTORY:  Non-contributory    SOCIAL HISTORY:  Social History   Social History     Substance and Sexual Activity   Alcohol Use Yes   • Alcohol/week: 4 0 standard drinks   • Types: 4 Glasses of wine per week    Comment: rare     Social History Substance and Sexual Activity   Drug Use Never     Social History     Tobacco Use   Smoking Status Former Smoker   • Quit date:    • Years since quittin 6   Smokeless Tobacco Never Used       ALLERGIES:  No Known Allergies    MEDICATIONS:  All current active medications have been reviewed  PHYSICAL EXAM:  Temp:  [99 6 °F (37 6 °C)-101 4 °F (38 6 °C)] 101 4 °F (38 6 °C)  HR:  [] 76  Resp:  [18-20] 20  BP: (100-116)/(51-73) 100/57  SpO2:  [93 %-99 %] 96 %  Temp (24hrs), Av 5 °F (38 1 °C), Min:99 6 °F (37 6 °C), Max:101 4 °F (38 6 °C)  Current: Temperature: (!) 101 4 °F (38 6 °C)    Intake/Output Summary (Last 24 hours) at 2022 4306  Last data filed at 2022 0507  Gross per 24 hour   Intake --   Output 220 ml   Net -220 ml       General Appearance:  Appearing well, nontoxic, and in no distress   Head:  Normocephalic, without obvious abnormality, atraumatic   Eyes:  Conjunctiva pink and sclera anicteric, both eyes   Nose: Nares normal, mucosa normal, no drainage   Throat: Oropharynx moist without lesions   Neck: Supple, symmetrical, no adenopathy, no tenderness/mass/nodules   Back:   Symmetric, no curvature, ROM normal, no CVA tenderness   Lungs:   Clear to auscultation bilaterally, respirations unlabored  Tachypnic  Chest Wall:  No tenderness or deformity   Heart:  RRR; no murmur, rub or gallop   Abdomen:   Soft, non-tender, non-distended, positive bowel sounds    Extremities: No cyanosis, clubbing or edema   Skin: No rashes or lesions  No draining wounds noted  Lymph nodes: Cervical, supraclavicular nodes normal   Neurologic: Alert and oriented times 3 though occasionally confused in conversation, extremity strength 5/5 and symmetric       LABS, IMAGING, & OTHER STUDIES:  Lab Results:  I have personally reviewed pertinent labs    Results from last 7 days   Lab Units 22  0206   WBC Thousand/uL 31 68*   HEMOGLOBIN g/dL 8 2*   PLATELETS Thousands/uL 534*     Results from last 7 days   Lab Units 08/31/22  0212   SODIUM mmol/L 138   POTASSIUM mmol/L 4 0   CHLORIDE mmol/L 109*   CO2 mmol/L 21   BUN mg/dL 43*   CREATININE mg/dL 1 35*   EGFR ml/min/1 73sq m 37   CALCIUM mg/dL 8 6   AST U/L 115*   ALT U/L 183*   ALK PHOS U/L 193*     Results from last 7 days   Lab Units 08/31/22  0206   BLOOD CULTURE  Received in Microbiology Lab  Culture in Progress  Received in Microbiology Lab  Culture in Progress  Results from last 7 days   Lab Units 08/31/22  0206   PROCALCITONIN ng/ml 0 54*                   Imaging Studies:   I have personally reviewed pertinent imaging study reports and images in PACS  Other Studies:   I have personally reviewed pertinent reports

## 2022-08-31 NOTE — WOUND OSTOMY CARE
Consult Note - Wound   Rico Wilder 76 y o  female MRN: [id number]  Unit/Bed#: [facility] 317-01 Encounter: [id number]        History and Present Illness:  Patient is 75 yo female admitted to [facility] with colitis due to possible c-diff infection  Patient is min assist with turning from side to side  Patient has Norma Guise in place for urinary incontinence and internal fecal management system  Patient is independent with meals  Assessment Findings:       1  POA unstageable sacrum- small, oval full thickness wound with 75% moist yellow slough and 25% pale pink tissue, small amount of drainage  Unable to full appreciate wound depth due to slough tissue  Unstageable wounds have the potential to evolve into full thickness stage III or stage IV wounds  No induration, fluctuance, odor, warmth/temperature differences, redness, or purulence noted to the above noted wounds and skin areas assessed  New dressings applied per orders listed below  Patient tolerated well- no s/s of non-verbal pain or discomfort observed during the encounter  Bedside nurse aware of plan of care  See flow sheets for more detailed assessment findings  Wound care will continue to follow  Skin care Plan:  1-Cleanse sacro-buttocks with soap and water  Apply Triad paste to wound bed and cover with Allevyn foam  Sami with T for treatment  Change everyday and PRN  2-Turn/reposition q2h or when medically stable for pressure re-distribution on skin   3-Elevate heels to offload pressure  4-Moisturize skin daily with skin nourishing cream  5-Ehob cushion in chair when out of bed  6-Hydraguard to bilateral heels BID and PRN  Wounds:  Wound 07/26/22 Pressure Injury Sacrum Mid (Active)   Wound Image   08/31/22 1503   Wound Description Beefy red;Slough 08/31/22 1503   Pressure Injury Stage U 08/31/22 1503   Karyn-wound Assessment Clean;Dry; Intact 08/31/22 1503   Wound Length (cm) 1 cm 08/31/22 1503   Wound Width (cm) 0 9 cm 08/31/22 1503   Wound Depth (cm) 0 2 cm 08/31/22 1503   Wound Surface Area (cm^2) 0 9 cm^2 08/31/22 1503   Wound Volume (cm^3) 0 18 cm^3 08/31/22 1503   Calculated Wound Volume (cm^3) 0 18 cm^3 08/31/22 1503   Change in Wound Size % 10 08/31/22 1503   Tunneling 0 cm 08/31/22 1503   Tunneling in depth located at 0 08/31/22 1503   Undermining 0 08/31/22 1503   Undermining is depth extending from 0 08/31/22 1503   Wound Site Closure ALICIA 08/31/22 1503   Drainage Amount Small 08/31/22 1503   Drainage Description Serosanguineous 08/31/22 1503   Non-staged Wound Description Full thickness 08/31/22 1503   Treatments Cleansed 08/31/22 1503   Dressing Foam, Silicon (eg  Allevyn, etc); Moisture barrier 08/31/22 1503   Wound packed? No 08/31/22 1503   Packing- # removed 0 08/31/22 1503   Packing- # inserted 0 08/31/22 1503   Dressing Changed New 08/31/22 1503   Patient Tolerance Tolerated well 08/31/22 1503   Dressing Status Clean;Dry; Intact 08/31/22 1503                             Belen DIAZN, RN, [facility]

## 2022-08-31 NOTE — ED NOTES
Pt finished bottle of PO contrast at this time     [facility]  08/31/22 9265

## 2022-08-31 NOTE — ASSESSMENT & PLAN NOTE
With incidental finding of cholelithiasis however generally the gallbladder is unremarkable  May have passed stones recently?

## 2022-08-31 NOTE — DISCHARGE INSTR - OTHER ORDERS
Skin care Plan:  1-Cleanse sacro-buttocks with soap and water  Apply Triad paste to wound bed and cover with Allevyn foam  Sami with T for treatment  Change everyday and PRN  2-Turn/reposition q2h or when medically stable for pressure re-distribution on skin   3-Elevate heels to offload pressure  4-Moisturize skin daily with skin nourishing cream  5-Ehob cushion in chair when out of bed    6-Preventative Hydraguard B/L heels BID and PRN  7-P-500 specialty mattress

## 2022-08-31 NOTE — ASSESSMENT & PLAN NOTE
POA as evidenced by fevers, leukocytosis with suspected C diff as source  · Continue p o  Vancomycin  · Given recent MSSA bacteremia, repeat blood cultures have been obtained  · UA is positive, urine culture is pending  Mild thickening of bladder on CT suggestive of cystitis     · Will change abx to ceftriaxone for now  · Id consult

## 2022-08-31 NOTE — ASSESSMENT & PLAN NOTE
Possibly secondary to increased losses from the GI tract due to diarrhea  Started on NOAH protocol  Intravenous fluid hydration

## 2022-08-31 NOTE — ASSESSMENT & PLAN NOTE
With incidental finding of cholelithiasis however generally the gallbladder is unremarkable on CT A/P     · Check acute hepatitis panel  · Patient is also on TPN at baseline   · Could be in setting of sepsis   · IV hydration   · Trend

## 2022-08-31 NOTE — SEPSIS NOTE
Sepsis Note   Jada Orellana 76 y o  female MRN: [id number]  Unit/Bed#: ED 01 Encounter: [id number]       qSOFA     [address] Name 08/31/22 0715 08/31/22 0500 08/31/22 0400 08/31/22 0300 08/31/22 0215    Altered mental status GCS < 15 -- -- -- -- --    Respiratory Rate > / =22 0 0 0 0 0    Systolic BP < / =092 1 0 0 0 0    Q Sofa Score 1 0 0 0 0    Row Name 08/30/22 2355                Altered mental status GCS < 15 --        Respiratory Rate > / =74 0        Systolic BP < / =143 0        Q Sofa Score 0                   Initial Sepsis Screening     Row Name 08/31/22 0301                Is the patient's history suggestive of a new or worsening infection? Yes (Proceed)  -CW        Suspected source of infection suspect infection, source unknown  -CW        Are two or more of the following signs & symptoms of infection both present and new to the patient? Yes (Proceed)  -CW        Indicate SIRS criteria Hyperthemia > 38 3C (100 9F); Tachycardia > 90 bpm;Leukocytosis (WBC > 71623 IJL)  -CW        If the answer is yes to both questions, suspicion of sepsis is present --        If severe sepsis is present AND tissue hypoperfusion perists in the hour after fluid resuscitation or lactate > 4, the patient meets criteria for SEPTIC SHOCK --        Are any of the following organ dysfunction criteria present within 6 hours of suspected infection and SIRS criteria that are NOT considered to be chronic conditions?  No  -CW        Organ dysfunction --        Date of presentation of severe sepsis --        Time of presentation of severe sepsis --        Tissue hypoperfusion persists in the hour after crystalloid fluid administration, evidenced, by either: --        Was hypotension present within one hour of the conclusion of crystalloid fluid administration? --        Date of presentation of septic shock --        Time of presentation of septic shock --              User Key  (r) = Recorded By, (t) = Taken By, (c) = Cosigned By [id number]  Name Provider Lupe Garnica MD Resident

## 2022-08-31 NOTE — ED PROVIDER NOTES
History  Chief Complaint   Patient presents with   • Fatigue     Patient from [address] [facility]  Stafff reports increased lethargy over the last couple of days with diarrhea  Patient is awake, alert, and oriented x4  Pt does report having diarrhea  Fever of 100 6 for EMS     75 y/o female presents to the ED via EMS from nursing home for evaluation of increased generalized weakness over the last few days with associated diarrhea  Per EMS, the patient was febrile to 100 6° F  The patient states that she does feel slightly tired and admits to having recent diarrhea, which she describes as loose, watery, brown bowel movements  She also reports mild diffuse crampy abdominal discomfort  Review of the medical record reveals that the patient had a C  difficile infection last month  She denies cough, dyspnea, chest pain, nausea, vomiting, and urinary symptoms  She notes that she has a chronic sacral wound that has been uncomfortable to sit on  Prior to Admission Medications   Prescriptions Last Dose Informant Patient Reported? Taking?    clonazePAM (KlonoPIN) 1 mg tablet   No No   Sig: Take 3 tablets (3 mg total) by mouth daily at bedtime for 7 doses   folic acid (FOLVITE) 1 mg tablet   No No   Sig: Take 1 tablet (1 mg total) by mouth daily   levothyroxine 112 mcg tablet  Self Yes No   Sig: TK 1 T PO QD   midodrine (PROAMATINE) 5 mg tablet   No No   Sig: Take 1 tablet (5 mg total) by mouth 3 (three) times a day before meals   sucralfate (CARAFATE) 1 g tablet   No No   Sig: Take 1 tablet (1 g total) by mouth 4 (four) times a day (before meals and at bedtime)   Patient not taking: Reported on 6/16/2022   thiamine (VITAMIN B1) 100 mg tablet   No No   Sig: Take 1 tablet (100 mg total) by mouth daily   traZODone (DESYREL) 50 mg tablet  Self Yes No   Sig: Take 150 mg by mouth daily at bedtime      Facility-Administered Medications: None       Past Medical History:   Diagnosis Date   • Anemia    • Anxiety    • Bipolar disorder [facility])    • Colon polyp    • Disease of thyroid gland    • Essential hypertension    • Essential tremor    • Fibromyalgia, primary    • GERD (gastroesophageal reflux disease)    • Inflammatory polyarthropathy (HCC)    • Mammogram abnormal    • Pressure injury of skin        Past Surgical History:   Procedure Laterality Date   • BREAST BIOPSY Right 2015    benign   • CARPAL TUNNEL RELEASE Bilateral    • COLONOSCOPY     • EGD AND COLONOSCOPY  2014   • GASTRIC BYPASS  2012   • MAMMO NEEDLE LOCALIZATION RIGHT (ALL INC) Right 2012   • MAMMO NEEDLE LOCALIZATION RIGHT (ALL INC) Right 2012   • ULNAR NERVE TRANSPOSITION Right        Family History   Problem Relation Age of Onset   • No Known Problems Mother    • No Known Problems Father    • No Known Problems Sister    • No Known Problems Maternal Grandmother    • No Known Problems Maternal Grandfather    • No Known Problems Paternal Grandmother    • No Known Problems Paternal Grandfather    • No Known Problems Sister    • No Known Problems Sister    • Stomach cancer Maternal Aunt    • No Known Problems Maternal Aunt    • No Known Problems Maternal Aunt    • No Known Problems Maternal Aunt    • No Known Problems Paternal Aunt    • Leukemia Other      I have reviewed and agree with the history as documented  E-Cigarette/Vaping   • E-Cigarette Use Never User      E-Cigarette/Vaping Substances   • Nicotine No    • THC No    • CBD No    • Flavoring No    • Other No    • Unknown No      Social History     Tobacco Use   • Smoking status: Former Smoker     Quit date:      Years since quittin 6   • Smokeless tobacco: Never Used   Vaping Use   • Vaping Use: Never used   Substance Use Topics   • Alcohol use: Yes     Alcohol/week: 4 0 standard drinks     Types: 4 Glasses of wine per week     Comment: rare   • Drug use: Never        Review of Systems   Constitutional: Positive for fatigue and fever  Negative for chills     HENT: Negative for congestion, rhinorrhea and sore throat  Respiratory: Negative for cough and shortness of breath  Cardiovascular: Negative for chest pain and palpitations  Gastrointestinal: Positive for abdominal pain and diarrhea  Negative for nausea and vomiting  Genitourinary: Negative for dysuria and hematuria  Musculoskeletal: Negative for back pain and neck pain  Neurological: Negative for dizziness, syncope, light-headedness, numbness and headaches  All other systems reviewed and are negative  Physical Exam  ED Triage Vitals   Temperature Pulse Respirations Blood Pressure SpO2   08/30/22 2355 08/30/22 2355 08/30/22 2355 08/30/22 2355 08/30/22 2355   99 6 °F (37 6 °C) 101 20 116/56 99 %      Temp Source Heart Rate Source Patient Position - Orthostatic VS BP Location FiO2 (%)   08/30/22 2355 08/30/22 2355 08/30/22 2355 08/30/22 2355 --   Oral Monitor; Apical Lying Left arm       Pain Score       08/31/22 0153       Med Not Given for Pain - for MAR use only             Orthostatic Vital Signs  Vitals:    08/31/22 0300 08/31/22 0400 08/31/22 0500 08/31/22 0715   BP: 107/73 110/56 103/51 100/57   Pulse: 90 86 78 76   Patient Position - Orthostatic VS:    Lying       Physical Exam  Vitals and nursing note reviewed  Constitutional:       General: She is not in acute distress  Appearance: Normal appearance  She is ill-appearing  She is not toxic-appearing  Comments: Chronically ill-appearing and cachectic  Appears slightly tired but otherwise in no acute distress, nontoxic appearing  HENT:      Head: Normocephalic and atraumatic  Right Ear: External ear normal       Left Ear: External ear normal       Nose: Nose normal  No congestion or rhinorrhea  Mouth/Throat:      Mouth: Mucous membranes are moist       Pharynx: Oropharynx is clear  No oropharyngeal exudate or posterior oropharyngeal erythema  Eyes:      Extraocular Movements: Extraocular movements intact        Conjunctiva/sclera: Conjunctivae normal       Pupils: Pupils are equal, round, and reactive to light  Cardiovascular:      Rate and Rhythm: Normal rate and regular rhythm  Pulses: Normal pulses  Heart sounds: Normal heart sounds  No murmur heard  Pulmonary:      Effort: Pulmonary effort is normal  No respiratory distress  Breath sounds: Normal breath sounds  No wheezing or rales  Abdominal:      General: Abdomen is flat  Bowel sounds are normal  There is no distension  Palpations: Abdomen is soft  Tenderness: There is no abdominal tenderness  There is no right CVA tenderness, left CVA tenderness or guarding  Musculoskeletal:         General: No swelling or tenderness  Normal range of motion  Cervical back: Normal range of motion and neck supple  No tenderness  Skin:     General: Skin is warm and dry  Capillary Refill: Capillary refill takes less than 2 seconds  Comments: Approximately stage II chronic sacral decubitus pressure ulcer, with no signs of cellulitis or superimposed infection  Neurological:      General: No focal deficit present  Mental Status: She is alert and oriented to person, place, and time           ED Medications  Medications   clonazePAM (KlonoPIN) tablet 3 mg (has no administration in time range)   folic acid (FOLVITE) tablet 1 mg (has no administration in time range)   levothyroxine tablet 112 mcg (has no administration in time range)   midodrine (PROAMATINE) tablet 5 mg (has no administration in time range)   thiamine tablet 100 mg (has no administration in time range)   traZODone (DESYREL) tablet 150 mg (has no administration in time range)   cefepime (MAXIPIME) 1,000 mg in dextrose 5 % 50 mL IVPB (has no administration in time range)   vancomycin (VANCOCIN) oral solution 125 mg (has no administration in time range)   multi-electrolyte (PLASMALYTE-A/ISOLYTE-S PH 7 4) IV solution (100 mL/hr Intravenous New Bag 8/31/22 0636)   acetaminophen (TYLENOL) tablet 650 mg (has no administration in time range)   ondansetron (ZOFRAN) injection 4 mg (has no administration in time range)   heparin (porcine) subcutaneous injection 5,000 Units (5,000 Units Subcutaneous Given 8/31/22 0636)   acetaminophen (TYLENOL) tablet 975 mg (975 mg Oral Given 8/31/22 0153)   cefepime (MAXIPIME) 2 g/50 mL dextrose IVPB (0 mg Intravenous Stopped 8/31/22 0320)   vancomycin (VANCOCIN) oral solution 125 mg (125 mg Oral Given 8/31/22 0228)   sodium chloride 0 9 % bolus 1,000 mL (0 mL Intravenous Stopped 8/31/22 0320)   iohexol (OMNIPAQUE) 240 MG/ML solution 50 mL (50 mL Oral Given 8/31/22 0301)   iohexol (OMNIPAQUE) 350 MG/ML injection (SINGLE-DOSE) 100 mL (80 mL Intravenous Given 8/31/22 0449)       Diagnostic Studies  Results Reviewed     Procedure Component Value Units Date/Time    Chronic Hepatitis Panel [[id number]]     Lab Status: No result Specimen: Blood     Clostridium difficile toxin by PCR with EIA [[id number]] Collected: 08/31/22 0635    Lab Status: In process Specimen: Stool from Per Rectum Updated: 08/31/22 0639    Urine Microscopic [[id number]]  (Abnormal) Collected: 08/31/22 0501    Lab Status: Final result Specimen: Urine, Straight Cath Updated: 08/31/22 0551     RBC, UA Innumerable /hpf      WBC, UA Innumerable /hpf      Epithelial Cells None Seen /hpf      Bacteria, UA Innumerable /hpf      MUCUS THREADS Occasional     Hyaline Casts, UA 25-50 /lpf      WBC Clumps Present    Urine culture [[id number]] Collected: 08/31/22 0501    Lab Status:  In process Specimen: Urine, Straight Cath Updated: 08/31/22 0551    Platelet count [[id number]]     Lab Status: No result Specimen: Blood     UA w Reflex to Microscopic w Reflex to Culture [[id number]]  (Abnormal) Collected: 08/31/22 0501    Lab Status: Final result Specimen: Urine, Straight Cath Updated: 08/31/22 0532     Color, UA Light Orange     Clarity, UA Extra Turbid     Specific [place], UA 1 012     pH, UA 8 0     Leukocytes, UA Large     Nitrite, UA Positive Protein,  (2+) mg/dl      Glucose, UA Negative mg/dl      Ketones, UA Negative mg/dl      Urobilinogen, UA <2 0 mg/dl      Bilirubin, UA Negative     Occult Blood, UA Small    Lactic acid, plasma [[id number]]  (Normal) Collected: 08/31/22 0415    Lab Status: Final result Specimen: Blood from Arm, Left Updated: 08/31/22 0509     LACTIC ACID 0 9 mmol/L     Narrative:      Result may be elevated if tourniquet was used during collection  [place] only [[id number]]  (Normal) Collected: 08/31/22 0206    Lab Status: Final result Specimen: Nares from Nose Updated: 08/31/22 0347     SARS-CoV-2 Negative    Narrative:      FOR PEDIATRIC PATIENTS - copy/paste COVID Guidelines URL to browser: [email or web address]  Fire[employer]    SARS-CoV-2 assay is a Nucleic Acid Amplification assay intended for the  qualitative detection of nucleic acid from SARS-CoV-2 in nasopharyngeal  swabs  Results are for the presumptive identification of SARS-CoV-2 RNA  Positive results are indicative of infection with SARS-CoV-2, the virus  causing COVID-19, but do not rule out bacterial infection or co-infection  with other viruses  Laboratories within the [place] and its  territories are required to report all positive results to the appropriate  public health authorities  Negative results do not preclude SARS-CoV-2  infection and should not be used as the sole basis for treatment or other  patient management decisions  Negative results must be combined with  clinical observations, patient history, and epidemiological information  This test has not been FDA cleared or approved  This test has been authorized by FDA under an Emergency Use Authorization  (EUA)   This test is only authorized for the duration of time the  declaration that circumstances exist justifying the authorization of the  emergency use of an in vitro diagnostic tests for detection of SARS-CoV-2  virus and/or diagnosis of COVID-19 infection under section 564(b)(1) of  the Act, 21 U  S C  753BLW-2(K)(4), unless the authorization is terminated  or revoked sooner  The test has been validated but independent review by FDA  and CLIA is pending  Test performed using [employer] GeneXpert: This RT-PCR assay targets N2,  a region unique to SARS-CoV-2  A conserved region in the E-gene was chosen  for pan-Sarbecovirus detection which includes SARS-CoV-2  Procalcitonin [[id number]]  (Abnormal) Collected: 08/31/22 0206    Lab Status: Final result Specimen: Blood from Arm, Left Updated: 08/31/22 0312     Procalcitonin 0 54 ng/ml     Lactic acid [[id number]]  (Normal) Collected: 08/31/22 0212    Lab Status: Final result Specimen: Blood from Arm, Left Updated: 08/31/22 0308     LACTIC ACID 1 0 mmol/L     Narrative:      Result may be elevated if tourniquet was used during collection      Comprehensive metabolic panel [[id number]]  (Abnormal) Collected: 08/31/22 0212    Lab Status: Final result Specimen: Blood from Arm, Left Updated: 08/31/22 0258     Sodium 138 mmol/L      Potassium 4 0 mmol/L      Chloride 109 mmol/L      CO2 21 mmol/L      ANION GAP 8 mmol/L      BUN 43 mg/dL      Creatinine 1 35 mg/dL      Glucose 96 mg/dL      Calcium 8 6 mg/dL      Corrected Calcium 10 4 mg/dL       U/L       U/L      Alkaline Phosphatase 193 U/L      Total Protein 6 9 g/dL      Albumin 1 7 g/dL      Total Bilirubin 0 37 mg/dL      eGFR 37 ml/min/1 73sq m     Narrative:      [facility] guidelines for Chronic Kidney Disease (CKD):   •  Stage 1 with normal or high GFR (GFR > 90 mL/min/1 73 square meters)  •  Stage 2 Mild CKD (GFR = 60-89 mL/min/1 73 square meters)  •  Stage 3A Moderate CKD (GFR = 45-59 mL/min/1 73 square meters)  •  Stage 3B Moderate CKD (GFR = 30-44 mL/min/1 73 square meters)  •  Stage 4 Severe CKD (GFR = 15-29 mL/min/1 73 square meters)  •  Stage 5 End Stage CKD (GFR <15 mL/min/1 73 square meters)  Note: GFR calculation is accurate only with a steady state creatinine    CBC and differential [[id number]]  (Abnormal) Collected: 08/31/22 0206    Lab Status: Final result Specimen: Blood from Hand, Left Updated: 08/31/22 0256     WBC 31 68 Thousand/uL      RBC 2 70 Million/uL      Hemoglobin 8 2 g/dL      Hematocrit 27 3 %       fL      MCH 30 4 pg      MCHC 30 0 g/dL      RDW 19 6 %      MPV 10 7 fL      Platelets 107 Thousands/uL      nRBC 0 /100 WBCs      Neutrophils Relative 79 %      Immat GRANS % 2 %      Lymphocytes Relative 11 %      Monocytes Relative 4 %      Eosinophils Relative 4 %      Basophils Relative 0 %      Neutrophils Absolute 24 81 Thousands/µL      Immature Grans Absolute >0 50 Thousand/uL      Lymphocytes Absolute 3 38 Thousands/µL      Monocytes Absolute 1 36 Thousand/µL      Eosinophils Absolute 1 31 Thousand/µL      Basophils Absolute 0 13 Thousands/µL     Lipase [[id number]]  (Normal) Collected: 08/31/22 0212    Lab Status: Final result Specimen: Blood from Arm, Left Updated: 08/31/22 0255     Lipase 140 u/L     Protime-INR [[id number]]  (Abnormal) Collected: 08/31/22 0212    Lab Status: Final result Specimen: Blood from Arm, Left Updated: 08/31/22 0253     Protime 15 6 seconds      INR 1 21    APTT [[id number]]  (Normal) Collected: 08/31/22 0212    Lab Status: Final result Specimen: Blood from Arm, Left Updated: 08/31/22 0253     PTT 34 seconds     Blood culture #1 [[id number]] Collected: 08/31/22 0206    Lab Status: In process Specimen: Blood from Hand, Left Updated: 08/31/22 0222    Blood culture #2 [[id number]] Collected: 08/31/22 0206    Lab Status: In process Specimen: Blood from Arm, Left Updated: 08/31/22 0222                 CT abdomen pelvis with contrast   Final Result by German Matson MD ([id number])      1  Diffuse wall thickening of the colon with surrounding fat stranding suspicious for colitis i e  infectious or inflammatory        2   Mild circumferential wall thickening of the urinary bladder  Correlate with urinalysis for possible cystitis  3   Cholelithiasis  The study was marked in [facility] for immediate notification  Workstation performed: [id number]               Procedures  Procedures      ED Course  ED Course as of 08/31/22 0757   Wed Aug 31, 2022   0223 Procedure Note: EKG  Date/Time: 08/31/22 2:15 AM   Interpreted by: Neha Moon MD  Indications / Diagnosis: sepsis eval  ECG reviewed by me, the ED Physician: yes   The EKG demonstrates:  Rhythm: normal sinus rhythm 99 bpm  Intervals: normal intervals  Axis: normal axis  QRS/Blocks: normal QRS  ST Changes: No acute ST Changes, no STD/WILLI  No STEMI  No significant change compared to ECG from 07/25/2022    0302 WBC(!!): 31 68   0302 Sepsis alert called   0315 LACTIC ACID: 1 0                          Initial Sepsis Screening     Row Name 08/31/22 0301                Is the patient's history suggestive of a new or worsening infection? Yes (Proceed)  -CW        Suspected source of infection suspect infection, source unknown  -CW        Are two or more of the following signs & symptoms of infection both present and new to the patient? Yes (Proceed)  -CW        Indicate SIRS criteria Hyperthemia > 38 3C (100 9F); Tachycardia > 90 bpm;Leukocytosis (WBC > 49915 IJL)  -CW        If the answer is yes to both questions, suspicion of sepsis is present --        If severe sepsis is present AND tissue hypoperfusion perists in the hour after fluid resuscitation or lactate > 4, the patient meets criteria for SEPTIC SHOCK --        Are any of the following organ dysfunction criteria present within 6 hours of suspected infection and SIRS criteria that are NOT considered to be chronic conditions?  No  -CW        Organ dysfunction --        Date of presentation of severe sepsis --        Time of presentation of severe sepsis --        Tissue hypoperfusion persists in the hour after crystalloid fluid administration, evidenced, by either: --        Was hypotension present within one hour of the conclusion of crystalloid fluid administration? --        Date of presentation of septic shock --        Time of presentation of septic shock --              User Key  (r) = Recorded By, (t) = Taken By, (c) = Cosigned By    [address] Name Provider Type    Ban Maldonado MD Resident                          MDM  Number of Diagnoses or Management Options  C  difficile diarrhea  Sepsis ([facility] )  Diagnosis management comments: This is a 10-year-old female presenting via EMS from nursing [place] for evaluation of increased generalized weakness over the last few days with associated diarrhea  Per EMS, the patient was febrile to 100 6° F  The patient states that she does feel slightly tired and admits to having recent diarrhea, which she describes as loose, watery, brown bowel movements  She also reports mild diffuse crampy abdominal discomfort  Review of the medical record reveals that the patient had a C  difficile infection last month  She denies cough, dyspnea, chest pain, nausea, vomiting, and urinary symptoms  She notes that she has a chronic sacral wound that has been uncomfortable to sit on  On exam the patient is warm to touch, chronically ill-appearing and cachectic  Appears slightly tired but otherwise in no acute distress, nontoxic appearing  Heart with RRR, lungs CTA b/l, abdomen is soft, nondistended, and nontender  Chronic stage II sacral pressure wound noted, with no overlying signs of infection  Will evaluate with septic labs, CT abdomen/pelvis, and stool specimen  Concern for persistent or recurrent C  difficile infection  WBC 31 68  Septic alert called  Patient started on antibiotics, cefepime and oral vancomycin  Case discussed with EILEEN for admission        Disposition  Final diagnoses:   C  difficile diarrhea   Sepsis ([facility] )     Time reflects when diagnosis was documented in both MDM as applicable and the Disposition within this note     Time User Action Codes Description Comment    8/31/2022  5:36 AM Justa Luke S Add [R19 7] Diarrhea of presumed infectious origin     8/31/2022  7:57 AM Pal Sabot Add [A04 72] C  difficile diarrhea     8/31/2022  7:57 AM Pal Sabot Modify [R19 7] Diarrhea of presumed infectious origin     8/31/2022  7:57 AM Pal Sabot Modify [A04 72] C  difficile diarrhea     8/31/2022  7:57 AM Pal Sabot Add [A41 9] Sepsis [facility])       ED Disposition     ED Disposition   Admit    Condition   Stable    Date/Time   Wed Aug 31, 2022  7:56 AM    Comment   Case was discussed with Dr Agnieszka Alvarez, and the patient's admission status was agreed to be Admission Status: inpatient status to the service of Dr Mirna Foss, [facility]  Follow-up Information    None         Patient's Medications   Discharge Prescriptions    No medications on file     No discharge procedures on file  PDMP Review       Value Time User    PDMP Reviewed  Yes 7/27/2022 12:35 PM Vince Lechuga DO           ED Provider  Attending physically available and evaluated Greg Mora I managed the patient along with the ED Attending      Electronically Signed by         Edilma Shelton MD  09/12/22 5508

## 2022-08-31 NOTE — ED NOTES
Patient repositioned and pad placed on right side  TV turned on for patient  Denies the need for anything further        Rick Hernandez RN  08/31/22 9710

## 2022-08-31 NOTE — ASSESSMENT & PLAN NOTE
Possibly secondary to increased losses from the GI tract due to diarrhea    · Continue IVF  · Avoid nephrotoxins and hypotension  · BMP in AM

## 2022-08-31 NOTE — PROGRESS NOTES
[facility] [facility]  Progress Note - Cascade Grooms 1945, 76 y o  female MRN: [id number]  Unit/Bed#: ED 01 Encounter: [id number]  Primary Care Provider: Wade Weiner MD   Date and time admitted to hospital: 8/30/2022 11:49 PM    * Colitis presumed to be due to infection  Assessment & Plan  Patient with recent admission for septic shock secondary to PICC related bacteremia  PICC was removed and patient was treated with antibiotics  Had recurrent sepsis felt to be secondary to aspiration pneumonia and completed additional intravenous antibiotic course for this  At this time, she was noted to have positive C diff, unclear if colonization versus infection  PCR was positive but EIA was negative  Completed course of oral vancomycin prophylaxis  Now presents with 2 day history of severe diarrhea  Of note, does have diarrhea at baseline  · CT A/P shows Diffuse wall thickening of the colon with surrounding fat stranding suspicious for colitis i e  infectious or inflammatory  Mild circumferential wall thickening of the urinary bladder  Correlate with urinalysis for possible cystitis  · Continue oral Vancomycin  · Add Banatrol supplements   · Limit abx as able, however treating for possible UTI  · ID consult  · Rectal tube for now given small decubitus ulcer and concern for wound contamination and further breakdown  Would only continue for short term  · Supportive care  · PT/OT evaluations  Was at [facility] for rehab, was possibly transitioning to long term care  Sepsis ([address] )  Assessment & Plan  POA as evidenced by fevers, leukocytosis with suspected C diff as source  · Continue p o  Vancomycin  · Given recent MSSA bacteremia, repeat blood cultures have been obtained  · UA is positive, urine culture is pending  Mild thickening of bladder on CT suggestive of cystitis     · Will change abx to ceftriaxone for now  · Id consult    Acute kidney insufficiency  Assessment & Plan  Possibly secondary to increased losses from the GI tract due to diarrhea  · Continue IVF  · Avoid nephrotoxins and hypotension  · BMP in AM    Acute cystitis without hematuria  Assessment & Plan  UA is positive with cystitis on CT A/P  No urinary symptoms  Fever/leukocytosis felt to be more related to C diff but cannot exclude UTI  · Culture pending  · D/C cefepime, Ceftriaxone for now    Elevated LFTs  Assessment & Plan  With incidental finding of cholelithiasis however generally the gallbladder is unremarkable on CT A/P  · Check acute hepatitis panel  · Patient is also on TPN at baseline   · Could be in setting of sepsis   · IV hydration   · Trend     H/O bariatric surgery - bypass  Assessment & Plan  Patient with history of gastric bypass surgery  Has had significant malnutrition issues and anastomotic ulcerations  Seen by Bariatric Service during recent hospitalization at [facility] who recommended prolonged course of TPN for nutritional optimization and possible revision surgery in the future  · Had an appointment 8/20 with bariatric surgery however this was canceled, will need to be rescheduled  · Currently has PICC line in place, will order TPN  Depression  Assessment & Plan  · On trazodone  Ambulatory dysfunction  Assessment & Plan  · Exacerbated by diarrhea/dehydration however patient is chronically malnourished  · PT/OT evaluations    Hypothyroidism  Assessment & Plan  · Continue levothyroxine       VTE Pharmacologic Prophylaxis:   Moderate Risk (Score 3-4) - Pharmacological DVT Prophylaxis Ordered: heparin  Patient Centered Rounds: I performed bedside rounds with nursing staff today  Discussions with Specialists or Other Care Team Provider: nursing    Education and Discussions with Family / Patient: Updated  (sister) via phone  Time Spent for Care: 30 minutes   More than 50% of total time spent on counseling and coordination of care as described above  Current Length of Stay: 0 day(s)  Current Patient Status: Inpatient   Certification Statement: The patient will continue to require additional inpatient hospital stay due to IV hydration, improvement in diarrhea, infectious work up, ID consult, PT/OT evals  Discharge Plan: Anticipate discharge in 48-72 hrs to discharge location to be determined pending rehab evaluations  Code Status: Level 1 - Full Code    Subjective:   Patient complains significant diarrhea and fecal incontinence  No abdominal pain  No dysuria, frequency or urgency  Overall feels very weak and fatigued  Objective:     Vitals:   Temp (24hrs), Av 5 °F (38 1 °C), Min:99 6 °F (37 6 °C), Max:101 4 °F (38 6 °C)    Temp:  [99 6 °F (37 6 °C)-101 4 °F (38 6 °C)] 101 4 °F (38 6 °C)  HR:  [] 76  Resp:  [18-20] 20  BP: (100-116)/(51-73) 100/57  SpO2:  [93 %-99 %] 96 %  There is no height or weight on file to calculate BMI  Input and Output Summary (last 24 hours): Intake/Output Summary (Last 24 hours) at 2022 0908  Last data filed at 2022 0507  Gross per 24 hour   Intake --   Output 220 ml   Net -220 ml       Physical Exam:   Physical Exam  Vitals and nursing note reviewed  Constitutional:       Appearance: She is ill-appearing  Cardiovascular:      Rate and Rhythm: Normal rate  Pulmonary:      Breath sounds: Decreased breath sounds present  Abdominal:      Palpations: Abdomen is soft  Tenderness: There is no abdominal tenderness  Musculoskeletal:         General: No swelling  Skin:     General: Skin is warm  Neurological:      Mental Status: She is alert and oriented to person, place, and time  Mental status is at baseline        Comments: Forgetful, periods of confusion    Psychiatric:         Mood and Affect: Mood normal           Additional Data:     Labs:  Results from last 7 days   Lab Units 22  0206   WBC Thousand/uL 31 68*   HEMOGLOBIN g/dL 8 2*   HEMATOCRIT % 27 3* PLATELETS Thousands/uL 534*   NEUTROS PCT % 79*   LYMPHS PCT % 11*   MONOS PCT % 4   EOS PCT % 4     Results from last 7 days   Lab Units 08/31/22  0212   SODIUM mmol/L 138   POTASSIUM mmol/L 4 0   CHLORIDE mmol/L 109*   CO2 mmol/L 21   BUN mg/dL 43*   CREATININE mg/dL 1 35*   ANION GAP mmol/L 8   CALCIUM mg/dL 8 6   ALBUMIN g/dL 1 7*   TOTAL BILIRUBIN mg/dL 0 37   ALK PHOS U/L 193*   ALT U/L 183*   AST U/L 115*   GLUCOSE RANDOM mg/dL 96     Results from last 7 days   Lab Units 08/31/22  0212   INR  1 21*             Results from last 7 days   Lab Units 08/31/22  0415 08/31/22  0212 08/31/22  0206   LACTIC ACID mmol/L 0 9 1 0  --    PROCALCITONIN ng/ml  --   --  0 54*       Lines/Drains:  Invasive Devices  Report    Peripherally Inserted Central Catheter Line  Duration           PICC Line 08/06/22 Right Brachial 24 days          Peripheral Intravenous Line  Duration           Peripheral IV 08/31/22 Left Hand <1 day    Peripheral IV 08/31/22 Left;Upper Arm <1 day          Drain  Duration           External Urinary Catheter 24 days                Central Line:  Goal for removal: Continuing for TPN  Imaging: Reviewed radiology reports from this admission including: abdominal/pelvic CT    Recent Cultures (last 7 days):   Results from last 7 days   Lab Units 08/31/22  0206   BLOOD CULTURE  Received in Microbiology Lab  Culture in Progress  Received in Microbiology Lab  Culture in Progress         Last 24 Hours Medication List:   Current Facility-Administered Medications   Medication Dose Route Frequency Provider Last Rate   • acetaminophen  650 mg Oral Q6H PRN Aubree Victor MD     • Adult TPN (STANDARD BASE/STANDARD ELECTROLYTE)   Intravenous Continuous TPN TOLU Orellana     • [START ON 9/1/2022] cefTRIAXone  1,000 mg Intravenous Q24H TOLU Orellana     • clonazePAM  3 mg Oral HS Aubree Victor MD     • folic acid  1 mg Oral Daily Aubree Victor MD     • heparin (porcine) 5,000 Units Subcutaneous Q8H Jonas Cheatham MD     • levothyroxine  112 mcg Oral Early Morning Dana Moran MD     • midodrine  5 mg Oral TID AC Car Weaver MD     • multi-electrolyte  100 mL/hr Intravenous Continuous Dana Moran  mL/hr (08/31/22 0636)   • ondansetron  4 mg Intravenous Q6H PRN Dana Moran MD     • thiamine  100 mg Oral Daily Dana Moran MD     • traZODone  150 mg Oral HS Dana Moran MD     • vancomycin  125 mg Oral Q6H Jonas Cheatham MD          Today, Patient Was Seen By: TOLU Miramontes    **Please Note: This note may have been constructed using a voice recognition system  **

## 2022-08-31 NOTE — ED NOTES
Bed Linens change and patient repositioned for comfort  Patient complaining of buttock pain  Pad placed under patients left side to relieve pressure and pain        Dianna Gomez RN  08/31/22 7087

## 2022-08-31 NOTE — H&P
79 [address] Richey 1945, 76 y o  female MRN: [id number]  Unit/Bed#: ED 01 Encounter: [id number]  Primary Care Provider: Cecilia Molina MD   Date and time admitted to hospital: 8/30/2022 11:49 PM    * Colitis presumed to be due to infection  Assessment & Plan  Two day history of diarrhea in the setting of recent antibiotic use for bacteremia  Possibility of C difficile infection and colitis is being entertained; C difficile study ordered  Started on vancomycin oral   Infectious disease consult  Gastroenterology consult  Recheck CBC with differential     Ambulatory dysfunction  Assessment & Plan  Physical therapy with case management consult  Acute kidney insufficiency  Assessment & Plan  Possibly secondary to increased losses from the GI tract due to diarrhea  Started on NOAH protocol  Intravenous fluid hydration  Acute cystitis without hematuria  Assessment & Plan  Patient recently was in hospital for bacteremia; started on cefepime for now  Elevated LFTs  Assessment & Plan  With incidental finding of cholelithiasis however generally the gallbladder is unremarkable  May have passed stones recently? Depression  Assessment & Plan  On trazodone  VTE Prophylaxis: Heparin  / sequential compression device   Code Status: Level 1 - Full Code   POLST: There is no POLST form on file for this patient (pre-hospital)    Anticipated Length of Stay:  Patient will be admitted on an Inpatient basis with an anticipated length of stay of  greater than 2 midnights  Justification for Hospital Stay: Please see detailed plans noted above      Chief Complaint:     The diarrhea with increasing weakness  History of Present Illness:  Leigh Ann Gill is a 76 y o  female who has past medical history significant for bipolar disorder and depression, hypertension, GERD, iron deficiency, coming in with increasing lethargy and weakness secondary to diarrhea for the past 2 days  Of note patient has a recent admission late last month due to septic shock secondary to PICC line-related bacteremia with IV cefazolin  She also had a right upper quadrant ultrasound with MRI negative for cholecystitis  That time, the Gram-positive bacteremia grew methicillin sensitive Staphylococcus aureus  Patient has been on TPN  Within the past 2 days, there is increasing diarrhea and although the patient had similar episodes of diarrhea in the last admission, it was thought secondary to malabsorption and she received p o  vancomycin for the 3 days status post systemic antibiotics finishing up to August 8  Here in the emergency room she was noted to have elevated white count at 31 68  Procalcitonin of 0 54  A CT of the abdomen has the findings of diffuse colitis and thickening of the bladder consistent with cystitis  Urinalysis shows presence of bacteria and leukocytes  Started on cefepime; she was also started on vancomycin p o  Currently, patient is generalized weak  She has however communicative and able to converse  She is lying flat on bed and resting      Review of Systems:    Constitutional:  Denies fever or chills   Eyes:  Denies change in visual acuity   HENT:  Denies nasal congestion or sore throat   Respiratory:  Denies cough or shortness of breath   Cardiovascular:  Denies chest pain or edema   GI:  Abdominal discomfort hypogastric, diarrhea  :  Denies dysuria   Musculoskeletal:  Denies back pain or joint pain   Integument:  Denies rash   Neurologic:  Denies headache, focal weakness or sensory changes   Endocrine:  Denies polyuria or polydipsia   Lymphatic:  Denies swollen glands   Psychiatric:  Denies depression or anxiety     Past Medical and Surgical History:   Past Medical History:   Diagnosis Date   • Anemia    • Anxiety    • Bipolar disorder ([facility] [address] )    • Colon polyp    • Disease of thyroid gland    • Essential hypertension    • Essential tremor • Fibromyalgia, primary    • GERD (gastroesophageal reflux disease)    • Inflammatory polyarthropathy (HCC)    • Mammogram abnormal    • Pressure injury of skin      Past Surgical History:   Procedure Laterality Date   • BREAST BIOPSY Right     benign   • CARPAL TUNNEL RELEASE Bilateral    • COLONOSCOPY     • EGD AND COLONOSCOPY  2014   • GASTRIC BYPASS  2012   • MAMMO NEEDLE LOCALIZATION RIGHT (ALL INC) Right 2012   • MAMMO NEEDLE LOCALIZATION RIGHT (ALL INC) Right 2012   • ULNAR NERVE TRANSPOSITION Right        Meds/Allergies:  (Not in a hospital admission)      Allergies: No Known Allergies  History:  Marital Status: Single   Occupation:   Patient Pre-hospital Living Situation:  Nursing nursing facility  Patient Pre-hospital Level of Mobility:  Ambulatory  Patient Pre-hospital Diet Restrictions:  Regular  Substance Use History:   Social History     Substance and Sexual Activity   Alcohol Use Yes   • Alcohol/week: 4 0 standard drinks   • Types: 4 Glasses of wine per week    Comment: rare     Social History     Tobacco Use   Smoking Status Former Smoker   • Quit date:    • Years since quittin 6   Smokeless Tobacco Never Used     Social History     Substance and Sexual Activity   Drug Use Never       Family History:  Family History   Problem Relation Age of Onset   • No Known Problems Mother    • No Known Problems Father    • No Known Problems Sister    • No Known Problems Maternal Grandmother    • No Known Problems Maternal Grandfather    • No Known Problems Paternal Grandmother    • No Known Problems Paternal Grandfather    • No Known Problems Sister    • No Known Problems Sister    • Stomach cancer Maternal Aunt    • No Known Problems Maternal Aunt    • No Known Problems Maternal Aunt    • No Known Problems Maternal Aunt    • No Known Problems Paternal Aunt    • Leukemia Other        Physical Exam:     Vitals:   Blood Pressure: 103/51 (22 0500)  Pulse: 78 (22 0500)  Temperature: (!) 101 4 °F (38 6 °C) (08/31/22 0118)  Temp Source: Rectal (08/31/22 0118)  Respirations: 18 (08/31/22 0500)  SpO2: 95 % (08/31/22 0500)    Constitutional:  Well developed, thin, no acute distress, non-toxic appearance but sickly appearing; sunken temples and clavicle   Eyes:  PERRL, conjunctiva normal   HENT:  Atraumatic, external ears normal, nose normal, oropharynx dry, no pharyngeal exudates  Neck- normal range of motion, no tenderness, supple   Respiratory:  No respiratory distress, normal breath sounds, no rales, no wheezing   Cardiovascular:  Normal rate, normal rhythm, no murmurs, no gallops, no rubs   GI:  Soft, nondistended, normal bowel sounds, nontender, no organomegaly, no mass, no rebound, no guarding   :  No costovertebral angle tenderness   Musculoskeletal:  No edema, no tenderness, no deformities  Back- no tenderness  Integument:  Well hydrated, no rash   Lymphatic:  No lymphadenopathy noted   Neurologic:  Alert &awake, communicative, CN 2-12 normal, generalized weakness without focal signs   Psychiatric:  Speech and behavior flat affect      Lab Results: I have personally reviewed pertinent reports  Results from last 7 days   Lab Units 08/31/22  0206   WBC Thousand/uL 31 68*   HEMOGLOBIN g/dL 8 2*   HEMATOCRIT % 27 3*   PLATELETS Thousands/uL 534*   NEUTROS PCT % 79*   LYMPHS PCT % 11*   MONOS PCT % 4   EOS PCT % 4     Results from last 7 days   Lab Units 08/31/22  0212   POTASSIUM mmol/L 4 0   CHLORIDE mmol/L 109*   CO2 mmol/L 21   BUN mg/dL 43*   CREATININE mg/dL 1 35*   CALCIUM mg/dL 8 6   ALK PHOS U/L 193*   ALT U/L 183*   AST U/L 115*     Results from last 7 days   Lab Units 08/31/22  0212   INR  1 21*           Imaging: I have personally reviewed pertinent reports  CT head wo contrast    Result Date: 8/4/2022  Narrative: CT BRAIN - WITHOUT CONTRAST INDICATION:   fall  COMPARISON:  May 24, 2022 TECHNIQUE:  CT examination of the brain was performed    In addition to axial images, sagittal and coronal 2D reformatted images were created and submitted for interpretation  Radiation dose length product (DLP) for this visit:  954 mGy-cm   This examination, like all CT scans performed in the [facility], was performed utilizing techniques to minimize radiation dose exposure, including the use of iterative reconstruction and automated exposure control  IMAGE QUALITY:  Diagnostic  FINDINGS: PARENCHYMA: Decreased attenuation is noted in periventricular and subcortical white matter demonstrating an appearance that is statistically most likely to represent mild microangiopathic change  No CT signs of acute infarction  No intracranial mass, mass effect or midline shift  No acute parenchymal hemorrhage  VENTRICLES AND EXTRA-AXIAL SPACES:  Normal for the patient's age  VISUALIZED ORBITS AND PARANASAL SINUSES:  No significant sinus disease  Persistent fluid in the right mastoid air cells suggesting chronic mastoiditis  CALVARIUM AND EXTRACRANIAL SOFT TISSUES:  Normal      Impression: No acute intracranial abnormality  Workstation performed: [id number]     CT spine cervical wo contrast    Result Date: 8/4/2022  Narrative: CT CERVICAL SPINE - WITHOUT CONTRAST INDICATION:   fall  COMPARISON:  May 24, 2022 TECHNIQUE:  CT examination of the cervical spine was performed without intravenous contrast   Contiguous axial images were obtained  Sagittal and coronal reconstructions were performed  Radiation dose length product (DLP) for this visit:  311 mGy-cm   This examination, like all CT scans performed in the [facility], was performed utilizing techniques to minimize radiation dose exposure, including the use of iterative reconstruction and automated exposure control  IMAGE QUALITY:  Diagnostic  FINDINGS: ALIGNMENT:  Normal lordosis  No significant scoliosis  VERTEBRAL BODIES:  Similar mottled sclerotic changes, stable from 2013  No acute fracture  DEGENERATIVE CHANGES:  Moderate multilevel cervical degenerative changes are noted  No critical central canal stenosis  PREVERTEBRAL AND PARASPINAL SOFT TISSUES:  Unremarkable  THORACIC INLET:  Stable pleural-parenchymal scarring  Incidental partial mastoid effusions bilaterally compatible with chronic mastoiditis  Impression: No cervical spine fracture or traumatic malalignment  Workstation performed: [id number]     MRI abdomen wo contrast and mrcp    Result Date: 8/4/2022  Narrative: MRI OF THE ABDOMEN WITHOUT CONTRAST WITH MRCP INDICATION: 76 years / Female  Fevers  Abnormal right upper quadrant ultrasound  Evaluate for CBD dilation/obstruction and/or pancreatic duct dilation  COMPARISON: Right upper quadrant ultrasound 8/1/2022  TECHNIQUE:  MRI of the abdomen was performed without the administration of contrast using the following  using sequences: Axial T1 weighted in/out of phase images, multiplanar T2 weighted images, diffusion weighted and fat suppressed T1 weighted images  3D MRCP images were obtained with radial thick slabs and projections  3D rendering was performed from the acquisition scanner  FINDINGS: Study is limited by patient motion  LOWER CHEST:   Moderate bilateral pleural effusions and bibasilar atelectasis  LIVER: Normal in size and configuration  No suspicious mass  Limited evaluation of hepatic veins and portal veins on this non-contrast MRI is unremarkable  BILE DUCTS:  No intrahepatic or extrahepatic bile duct dilation  Common bile duct measures up to 7 mm, which is likely within normal limits for patient of this age  No choledocholithiasis, biliary stricture or suspicious mass  GALLBLADDER:  Cholelithiasis without significant gallbladder wall thickening  Trace pericholecystic fluid, noting that there is also right upper quadrant ascites  PANCREAS:  Unremarkable  No pancreatic duct dilatation   ADRENAL GLANDS:  Normal  SPLEEN:  Normal  KIDNEYS/PROXIMAL URETERS:  No hydroureteronephrosis  No suspicious renal mass  BOWEL:  No dilated loops of bowel  PERITONEAL CAVITY/RETROPERITONEUM:  Small volume ascites  No mass  LYMPH NODES:  No abdominal lymphadenopathy noting limited ADC sequence  VASCULAR STRUCTURES:  Unremarkable  No aneurysm  ABDOMINAL WALL:  Unremarkable  OSSEOUS STRUCTURES:  No suspicious osseous lesion  Impression: Limited study due to patient motion  Within the limitations: 1  Cholelithiasis without significant wall thickening, but with trace pericholecystic fluid  Given reported positive sonographic King sign on prior ultrasound, findings could represent acute cholecystitis  2   No biliary ductal dilatation or choledocholithiasis  3   No pancreatic duct dilatation  Workstation performed: [id number]     US right upper quadrant    Result Date: 8/2/2022  Narrative: RIGHT UPPER QUADRANT ULTRASOUND INDICATION:     Elevated LFTs, leukocytosis  COMPARISON:  5/24/2022 CT abdomen and CT chest; CT from 6/16/2021  TECHNIQUE:   Real-time ultrasound of the right upper quadrant was performed with a curvilinear transducer with both volumetric sweeps and still imaging techniques  FINDINGS: PANCREAS:  The pancreatic duct measures up to 2 mm without abrupt change in caliber  The pancreatic duct measures approximately 2 mm near the papilla on a prior CT in 2021 although not as visible in the body region  AORTA AND IVC:  Visualized portions are normal for patient age  LIVER: Size:  Within normal range  The liver measures 14 4 cm in the midclavicular line  Contour:  Surface contour is smooth  Parenchyma:  Echogenicity and echotexture are within normal limits  No liver mass identified  Limited imaging of the main portal vein shows it to be patent and hepatopetal   BILIARY: Shadowing gallstones are noted  These were noted on the prior CT in May  There is gallbladder wall thickening measuring 4 mm  No pericholecystic fluid is seen although ascites is seen elsewhere  Sonographic King sign was elicited  No intrahepatic biliary dilatation  CBD measures 7 0 mm  This is borderline in dimension  There is a fairly abrupt cut off the common bile duct within the pancreatic head near the papilla although no obvious shadowing calculus can be appreciated  No choledocholithiasis  KIDNEY: Right kidney measures 10 4 x 6 7 x 6 3 cm  Volume 229 0 mL Trace perinephric fluid is identified  ASCITES:   Mild ascites is present  Impression: Mild ascites is noted  Borderline common bile duct at 7 mm  Pancreatic duct measures 2 mm which still falls within normal limits although somewhat more pronounced than prior imaging  Gallstones with wall thickening and sonographic King sign  Cholecystitis is a consideration in the proper clinical context  Clinical correlation with any significant pain or rising white count is advised  MRI/MRCP follow-up is suggested given the borderline common bile duct and mildly prominent pancreatic duct assuming no contraindications  Dr Zain Gant was notified via tiger text at 8:15am Workstation performed: [id number]     CT abdomen pelvis with contrast    Result Date: 8/31/2022  Narrative: CT ABDOMEN AND PELVIS WITH IV CONTRAST INDICATION:   Abdominal cramping, diarrhea, fever, hx of gastric bypass  COMPARISON:  CT abdomen pelvis on 5/24/2022  TECHNIQUE:  CT examination of the abdomen and pelvis was performed  Axial, sagittal, and coronal 2D reformatted images were created from the source data and submitted for interpretation  Radiation dose length product (DLP) for this visit:  354 mGy-cm   This examination, like all CT scans performed in the [facility], was performed utilizing techniques to minimize radiation dose exposure, including the use of iterative reconstruction and automated exposure control  IV Contrast:  80 mL of iohexol (OMNIPAQUE) Enteric Contrast:  Enteric contrast was administered   FINDINGS: ABDOMEN LOWER CHEST:  Atelectatic changes are noted at the lung bases  LIVER/BILIARY TREE:  Unremarkable  GALLBLADDER:  There are gallstone(s) within the gallbladder, without pericholecystic inflammatory changes  SPLEEN:  Unremarkable  PANCREAS:  Unremarkable  ADRENAL GLANDS:  Unremarkable  KIDNEYS/URETERS:  Unremarkable  No hydronephrosis  STOMACH AND BOWEL:  Diffuse wall thickening of the colon, most prominent in the cecum, ascending colon and transverse colon  There is surrounding fat stranding  Status post gastric bypass  No bowel obstruction  APPENDIX:  No findings to suggest appendicitis  ABDOMINOPELVIC CAVITY:  No ascites  No pneumoperitoneum  No lymphadenopathy  VESSELS:  Atherosclerotic changes are present  No evidence of aneurysm  PELVIS REPRODUCTIVE ORGANS:  Unremarkable for patient's age  URINARY BLADDER:  Mild circumferential wall thickening  ABDOMINAL WALL/INGUINAL REGIONS:  Unremarkable  OSSEOUS STRUCTURES:  No acute fracture or destructive osseous lesion  Impression: 1  Diffuse wall thickening of the colon with surrounding fat stranding suspicious for colitis i e  infectious or inflammatory  2   Mild circumferential wall thickening of the urinary bladder  Correlate with urinalysis for possible cystitis  3   Cholelithiasis  The study was marked in [facility] for immediate notification  Workstation performed: [id number]         ** Please Note: Dragon 360 Dictation voice to text software was used in the creation of this document   **

## 2022-08-31 NOTE — ASSESSMENT & PLAN NOTE
UA is positive with cystitis on CT A/P  No urinary symptoms  Fever/leukocytosis felt to be more related to C diff but cannot exclude UTI     · Culture pending  · D/C cefepime, Ceftriaxone for now

## 2022-08-31 NOTE — ED NOTES
Patient transported to [address] [place] [address]  08/31/22 [address] [facility]  08/31/22 5938

## 2022-09-01 PROBLEM — D64.9 ACUTE ON CHRONIC ANEMIA: Status: ACTIVE | Noted: 2022-09-01

## 2022-09-01 PROBLEM — Z71.89 GOALS OF CARE, COUNSELING/DISCUSSION: Status: ACTIVE | Noted: 2022-09-01

## 2022-09-01 PROBLEM — E44.0 MODERATE PROTEIN-CALORIE MALNUTRITION (HCC): Status: ACTIVE | Noted: 2022-09-01

## 2022-09-01 PROBLEM — R78.81 BACTEREMIA: Status: ACTIVE | Noted: 2022-09-01

## 2022-09-01 NOTE — MALNUTRITION/BMI
This medical record reflects one or more clinical indicators suggestive of malnutrition   Malnutrition Findings:   Adult Malnutrition type: Chronic illness  Adult Degree of Malnutrition: Malnutrition of moderate degree  Malnutrition Characteristics: Fat loss, Muscle loss                  360 Statement: Moderate malnutrition in setting of chronic illness related to ongoing GI issues/malabsorption as evidenced by severely sunken orbital lobes, sunken temporal lobes, severely protruding clavicular bone    BMI Findings: There is no height or weight on file to calculate BMI  See Nutrition note dated 9/1 for additional details  Completed nutrition assessment is viewable in the nutrition documentation

## 2022-09-01 NOTE — PROGRESS NOTES
Vancomycin Assessment    Raymond Roland is a 76 y o  female who is currently receiving vancomycin 1000 mg IV q24h for bacteremia     Relevant clinical data and objective history reviewed:  Creatinine   Date Value Ref Range Status   08/31/2022 1 35 (H) 0 60 - 1 30 mg/dL Final     Comment:     Standardized to IDMS reference method   08/08/2022 1 12 0 60 - 1 30 mg/dL Final     Comment:     Standardized to IDMS reference method   08/07/2022 1 10 0 60 - 1 30 mg/dL Final     Comment:     Standardized to IDMS reference method     Vancomycin Rm   Date Value Ref Range Status   07/27/2022 16 0 10 0 - 20 0 ug/mL Final     /53 (BP Location: Left arm)   Pulse 86   Temp 97 9 °F (36 6 °C) (Oral)   Resp 18   SpO2 94%   I/O last 3 completed shifts:  In: -   Out: 220 [Urine:220]  Lab Results   Component Value Date/Time    BUN 43 (H) 08/31/2022 02:12 AM    BUN 17 11/14/2020 10:02 AM    WBC 31 68 (HH) 08/31/2022 02:06 AM    HGB 8 2 (L) 08/31/2022 02:06 AM    HCT 27 3 (L) 08/31/2022 02:06 AM     (H) 08/31/2022 02:06 AM     (H) 08/31/2022 02:06 AM     Temp Readings from Last 3 Encounters:   08/31/22 97 9 °F (36 6 °C) (Oral)   08/09/22 99 3 °F (37 4 °C) (Oral)   07/20/22 98 4 °F (36 9 °C) (Temporal)     Vancomycin Days of Therapy: 1    Assessment/Plan  The patient is currently on vancomycin utilizing scheduled dosing based on actual body weight  Baseline risks associated with therapy include: pre-existing renal impairment and advanced age  The patient is currently receiving 1000 mg IV q24h and is clinically appropriate and dose will be continued  Pharmacy will also follow closely for s/sx of nephrotoxicity, infusion reactions, and appropriateness of therapy  BMP and CBC will be ordered per protocol  Plan for trough as patient approaches steady state, prior to the 4th  dose at approximately 03:30 on 9/4/22  Due to infection severity, will target a trough of 15-20 (appropriate for most indications)   Pharmacy will continue to follow the patient’s culture results and clinical progress daily      Mortimer Screws, Pharmacist

## 2022-09-01 NOTE — PROGRESS NOTES
Progress Note - Infectious Disease   Greg Mora 76 y o  female MRN: [id number]  Unit/Bed#: [facility] 317-01 Encounter: [id number]      Impression/Plan:  1  Sepsis  Pt presenting with significant leukocytosis to 31 and fever to 101 4  She was recently admitted 1 month ago for PICC line infection with Staph epidermidis bacteremia, treated with IV cefazolin  She has long-standing PICC requirement due to #6  Source for sepsis #2 vs #3 vs recurrent bacteremia from PICC line  Pt now afebrile and hemodynamically stable but continues to be tachypnic with soft BP's mainly in 's/50's, WBC trending down at 14 today  1/2 BCx now growing staph epidermidis, likely a contaminant  UCx as in #3  · Antibiotics with ceftriaxone and PO vancomycin, as below  · Stop IV vancomycin as positive BCx likely a contaminant   · Will obtain repeat BCx today, f/u results   · Continue to follow BCx   · Continue to monitor fever curve, vitals, and WBC      2  Colitis  Presented with 2 days of diarrhea  During last admission 1 month ago pt was found to have positive C diff PCR but negative EIA, indicating colonization rather than infection  She was treated with prophylactic PO vancomycin at that time  CT A/P this admission revealing diffuse colonic thickening suggestive of colitis  C diff this admission again with positive PCR but negative EIA, indicating colonization rather than acute infection  Pt now has rectal tube in place with liquid, dark brown stool in bag, not overtly bloody  · Continue PO vancomycin 125mg q6h given degree of worsening diarrhea, appearance of colitis on imaging, and clinical sepsis  · Consider workup for other causes of infectious and non-infectous colitis as C diff is not the clear cause     3  Cystitis  CT A/P also showing bladder wall thickening suggestive of cystitis  UA with innumerable RBCs, WBCs, and bacteria  Pt does endorse dysuria PTA   UCx with 80k cfu enteric-like GNRs, 40k proteus, and 40k enterococcus  · Restart ceftriaxone 1g q24h, would treat for UTI as pt was symptomatic and still has clinical signs of sepsis   · Continue to monitor fever curve, vitals, and WBC      4  NOAH  Cr on presentation 1 35, likely pre-renal etiology in the setting of diarrhea and dehydration  Improving, Cr today 1  19  Avoid nephrotoxic agents and hypotension  Continue to monitor  Will dose adjust antibiotics as indicated       5  Elevated LFT's  No h/o liver disease  Labs from earlier this year show occasional elevation in ALP but AST and ALT mostly wnl  CMP on arrival: , , , albumin 1 7, INR 1 21  CT A/P also showing cholelithiasis with no inflammatory changes  Pt does have h/o RUQ pain and gallbladder inflammation, per chart review  Prior LFT abnormalities have been more cholestatic pattern compared to this more hepatocellular/ mixed pattern  Hepatitis panel negative  Other etiology could include shock liver in the setting of sepsis  LFTs downtrending today  Continue to monitor       6  TPN use  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  Pt presented to [facility] earlier this year with upper GI bleeding, found to have marginal ulcer bleeding on endoscopy  Started on TPN to maintain nutrition until bariatric surgery follow up, which was supposed to occur last week but it appears that pt cancelled appointment  She does have PICC in place with no surrounding signs of infection  Antibiotics:  PO vancomycin #3  Ceftriaxone #2, total #3    Subjective:  Patient states that she is "having a terrible day " She feels that she is not being treated well  She states that she is tired and annoyed with so many people coming in and out  She is difficult to follow in conversation and often appears confused  She denies fever, chills, sweats, vomiting, coough, shortness of breath, and pain  She does endorse mild nausea and abdominal pain       Objective:  Vitals:  Temp:  [97 3 °F (36 3 °C)-97 9 °F (36 6 °C)] 97 5 °F (36 4 °C)  HR:  [68-86] 74  Resp:  [18-24] 20  BP: ()/(41-53) 94/50  SpO2:  [94 %-100 %] 97 %  Temp (24hrs), Av 6 °F (36 4 °C), Min:97 3 °F (36 3 °C), Max:97 9 °F (36 6 °C)  Current: Temperature: 97 5 °F (36 4 °C)    Physical Exam:   General Appearance:  Alert, interactive, nontoxic, no acute distress  Throat: Oropharynx moist without lesions  Lungs: Tachypnic  Crackles at bilateral bases  Heart:  RRR; no murmur, rub or gallop   Abdomen:   Soft, non-tender, non-distended, positive bowel sounds  Extremities: No clubbing, cyanosis or edema   Skin: No new rashes or lesions  No draining wounds noted  Labs, Imaging, & Other studies:   All pertinent labs and imaging studies were personally reviewed  Results from last 7 days   Lab Units 22  0206   WBC Thousand/uL 31 68*   HEMOGLOBIN g/dL 8 2*   PLATELETS Thousands/uL 534*     Results from last 7 days   Lab Units 22  0618 22  0212   SODIUM mmol/L 139 138   POTASSIUM mmol/L 3 6 4 0   CHLORIDE mmol/L 112* 109*   CO2 mmol/L 21 21   BUN mg/dL 36* 43*   CREATININE mg/dL 1 19 1 35*   EGFR ml/min/1 73sq m 43 37   CALCIUM mg/dL 8 2* 8 6   AST U/L 53* 115*   ALT U/L 115* 183*   ALK PHOS U/L 149* 193*     Results from last 7 days   Lab Units 22  0635 22  0501 22  0206   BLOOD CULTURE   --   --  No Growth at 24 hrs  Received in Microbiology Lab  Culture in Progress     URINE CULTURE   --  80,000-89,000 cfu/ml - 2 strains Gram Negative Jerome Enteric Like*  40,000-49,000 cfu/ml Proteus species*  40,000-49,000 cfu/ml Enterococcus species*  --    C DIFF TOXIN B BY PCR  Positive*  --   --      Results from last 7 days   Lab Units 22  0206   PROCALCITONIN ng/ml 0 54*

## 2022-09-01 NOTE — ASSESSMENT & PLAN NOTE
· Exacerbated by diarrhea/dehydration however patient is chronically malnourished     · S/p PT/OT evaluation; to return to SNF

## 2022-09-01 NOTE — ASSESSMENT & PLAN NOTE
· Attempt to discuss goals of care but pt not receptive and stated wishing to be txt focused for now  · Per her sister, she feels pt has essentially "given up"  · Will attempt again tomorrow and hopefully pt is agreeable to palliative consult

## 2022-09-01 NOTE — PHYSICAL THERAPY NOTE
PHYSICAL THERAPY TREATMENT  NAME:  Alanna Sims  DATE: 09/01/22    AGE:   76 y o   Mrn:   [id number]  ADMIT DX:  Diarrhea of presumed infectious origin [R19 7]  Fatigue [R53 83]  C  difficile diarrhea [A04 72]  Sepsis ([address] ) [A41 9]    Past Medical History:   Diagnosis Date    Anemia     Anxiety     Bipolar disorder (HCC)     Colon polyp     Disease of thyroid gland     Essential hypertension     Essential tremor     Fibromyalgia, primary     GERD (gastroesophageal reflux disease)     Inflammatory polyarthropathy (HCC)     Mammogram abnormal     Pressure injury of skin      Past Surgical History:   Procedure Laterality Date    BREAST BIOPSY Right 2015    benign    CARPAL TUNNEL RELEASE Bilateral     COLONOSCOPY      EGD AND COLONOSCOPY  01/01/2014    GASTRIC BYPASS  07/01/2012    MAMMO NEEDLE LOCALIZATION RIGHT (ALL INC) Right 2/6/2012    MAMMO NEEDLE LOCALIZATION RIGHT (ALL INC) Right 2/6/2012    ULNAR NERVE TRANSPOSITION Right        Length Of Stay: 1     09/01/22 0836   PT Last Visit   PT Visit Date 09/01/22   Note Type   Note type Evaluation   Pain Assessment   Pain Assessment Tool 0-10   Pain Score No Pain   Restrictions/Precautions   Other Precautions Contact/isolation;Cognitive; Chair Alarm; Bed Alarm;Multiple lines; Fall Risk  (rectal tube/ TPN; IV)   Home Living   Type of [address] [place] [address] Multi-level;1/2 bath on main level;Bed/bath upstairs  (1STE)   Bathroom Shower/Tub Tub/shower unit   Bathroom Toilet Standard   Bathroom Equipment Grab bars in shower   Additional Comments Prior to this admission, pt was receiving rehab at [facility]- reports was working w/ PT/OT on ambulation w/ RW + ADL's w/ Ax1  Pt somewhat limited historian nd could not specify ambulation dist acnes  Pt does reprot at baseline- living alone in a 3SH w/ 1 WILLI and 1* bed and bath on 2nd floor   Pt reports being indep;at baseline  Pt does become agitated w/ questioning and required redirection from PT/OT     Prior Function Level of [place] Independent with ADLs and functional mobility   Lives With Alone   General   Family/Caregiver Present No   Cognition   Orientation Level Oriented to person;Oriented to place   Memory Decreased recall of recent events  (waxes and wanes- noted inconsisstancies)   Following Commands Follows one step commands with increased time or repetition   RLE Assessment   RLE Assessment WFL  (3+/5 functional observation-)   LLE Assessment   LLE Assessment WFL  (3+/5 functional observation- limited participation w/ MMT)   Coordination   Sensation WFL   Light Touch   RLE Light Touch Grossly intact   LLE Light Touch Grossly intact   Sharp/Dull   RLE Sharp/Dull Grossly intact   LLE Sharp/Dull Grossly intact   Proprioception   RLE Proprioception Grossly intact   LLE Proprioception Grossly Intact   Bed Mobility   Supine to Sit 4  Minimal assistance   Additional items Assist x 1; Increased time required;Verbal cues   Additional Comments sits EOB w/ clsoe S> CGA- denies dizziness- on RA- vitals stable   Transfers   Sit to Stand 4  Minimal assistance   Additional items Assist x 1; Increased time required;Verbal cues   Stand to Sit 4  Minimal assistance   Additional items Assist x 1; Increased time required;Verbal cues   Ambulation/Elevation   Gait pattern Improper Weight shift;Narrow BRANDAN; Decreased foot clearance;Shuffling;Excessively slow   Gait Assistance 4  Minimal assist   Assistive Device Rolling walker   Distance ~3' to chair w/ distances self limited by pt (unable to be redirected)   Stair Management Assistance Not tested   Balance   Static Sitting Fair -   Dynamic Sitting Poor +   Static Standing Poor   Dynamic Standing Poor   Ambulatory Poor   Endurance Deficit   Endurance Deficit Yes   Endurance Deficit Description gross weakness and deconditioning + frail   Activity Tolerance   Activity Tolerance Patient limited by fatigue;Treatment limited secondary to agitation   Medical Staff Made Aware yes-c are coordiantion w/ RN + OT for medical conplexity + poor tolerance   Nurse Made Aware yes- cleared for session + updated post session   Assessment   Prognosis Fair   Problem List Decreased strength;Decreased endurance; Impaired balance;Decreased mobility; Decreased coordination;Decreased cognition; Impaired judgement;Decreased safety awareness;Decreased skin integrity   Assessment Pt is 76 y o  female seen for PT evaluation s/p admit to [address] [place] [address] on 8/30/2022  Pt presenting from [address] rehab w/ 2 days diarrhea + abdominal pain; weakness and lethargy  Current dx/ problem list includes; colitis; c-diff diarrhea; fatigue and ambulatory dysfunction  Pt   has a past medical history of Anemia, Anxiety, Bipolar disorder ([facility] [address] ), Colon polyp, Disease of thyroid gland, Essential hypertension, Essential tremor, Fibromyalgia, primary, GERD (gastroesophageal reflux disease), Inflammatory polyarthropathy ([facility] [address] [facility] ), Mammogram abnormal, and Pressure injury of skin  Due to acute medical issues, ongoing medical workup/ management for primary dx; pain, fall risk, cognitive impairment; impaired safety awareness decreased activity tolerance compared to baseline, increased assistance needed from caregiver at current time, continuous  monitoring, multiple lines/ TPN; rectal tube,  decline in overall functional mobility status; limited baseline functional mobility;  health management issues; hospital readmission- note unstable clinical picture (high complexity)   Prior to this admission, pt was receiving rehab at [address] [place] [address]- reports was working w/ PT/OT on ambulation w/ RW + ADL's w/ Ax1  Pt somewhat limited historian nd could not specify ambulation dist acnes  Pt does reprot at baseline- living alone in a 3SH w/ 1 WILLI and 1* bed and bath on 2nd floor   Pt reports being indep;at baseline  Pt does become agitated w/ questioning and required redirection from PT/OT   Currently pt  is requiring Paulo for bed skills; Paulo for functional transfers and Paulo for ambulation w/ RW ~3' to chair w/ distances self limited by pt  Pt presents functioning below baseline and currently w/ overall mobility deficits 2* to: decreased LE strength/AROM; severe deconditioning; generalized weakness;  decreased endurance; decreased activity tolerance; decreased coordination; impaired balance; gait deviations; decreased safety awareness; impaired cognition;  SOB/DELUNA; fatigue; impaired safety and judgement; limited insight into current deficits; bed/ chair alarms; multiple lines; frail; incontinent   Pt currently at risk for falls  (Please find additional objective findings from PT assessment regarding body systems outlined above ) Pt will continue to benefit from skilled PT interventions on a 2-3x week basis to address stated impairments; to maximize functional potential; for ongoing pt/ family training; and DME needs  PT is currently recommending return to rehab w/ PT/OT evals on return  Anticipate pt will require transition to LTC/ increased level of care/ assist as long term plan given history and ongoing deficits  Goals   Patient Goals get breakfast   STG Expiration Date 09/15/22   Short Term Goal #1 In 14 days pt will complete: 1) Bed mobility skills with S to facilitate safe return to previous living environment and decrease burden on caregivers  2) Functional transfers with S  to facilitate safe return to previous living environment  3) Ambulation with least restrictive AD w/ S 50+50' ' without LOB and stable vitals for safe ambulation in living environment  4 Improve balance by 1 grade in order to decrease fall risk  5) Improve LE strength grades by 1 to increase independence w/ all functional mobility, transfers and gait  6) PT for ongoing pt and family education; DME needs and D/C planning to promote highest level of function in least restrictive environment     PT Treatment Day 0   Recommendation   PT Discharge Recommendation Return to facility with rehabilitation services   Equipment Recommended   (RW)   [address] [place] [address] Mobility Inpatient   Turning in Bed Without Bedrails 3   Lying on Back to Sitting on Edge of Flat Bed 3   Moving Bed to Chair 3   Standing Up From Chair 3   Walk in Room 2   Climb 3-5 Stairs 1   Basic Mobility Inpatient Raw Score 15   Basic Mobility Standardized Score 36 97   Highest Level Of Mobility   -[facility] Goal 4: Move to chair/commode   -HL Achieved 4: Move to chair/commode   Barthel Index   Feeding 10   Bathing 0   Grooming Score 0   Dressing Score 5   Bladder Score 5   Bowels Score 0   Toilet Use Score 5   Transfers (Bed/Chair) Score 5   Mobility (Level Surface) Score 0   Stairs Score 0   Barthel Index Score 30   End of Consult   Patient Position at End of Consult Bedside chair;Bed/Chair alarm activated; All needs within reach       The patient's AM-PAC Basic Mobility Inpatient Short Form Raw Score is 15  A Raw score of less than or equal to 16 suggests the patient may benefit from discharge to post-acute rehabilitation services  Please also refer to the recommendation of the Physical Therapist for safe discharge planning            Ramos Pack, PT

## 2022-09-01 NOTE — ASSESSMENT & PLAN NOTE
· Recent hx of staph epi bacteremia in 7/2022, presumed due to picc line  · Now with possible recurrence 1/2 set staph epi, 2nd set no growth thus far  · ID on board  · On IV vancomycin  · May need to have PICC removed  But will leave in place for now given poor IV access, ok per ID  · Cardiology consulted, will need TAVIA  S/p TTE in 7/2022  · Unfortunately unable to obtain rpt blood cx today  · GI consulted as will need alternate form of feeding

## 2022-09-01 NOTE — UTILIZATION REVIEW
Initial Clinical Review    Admission: Date/Time/Statement:   Admission Orders (From admission, onward)     Ordered        08/31/22 0542  Inpatient Admission  Once                      Orders Placed This Encounter   Procedures   • Inpatient Admission     Standing Status:   Standing     Number of Occurrences:   1     Order Specific Question:   Level of Care     Answer:   Med Surg [16]     Order Specific Question:   Estimated length of stay     Answer:   More than 2 Midnights     Order Specific Question:   Certification     Answer:   I certify that inpatient services are medically necessary for this patient for a duration of greater than two midnights  See H&P and MD Progress Notes for additional information about the patient's course of treatment  ED Arrival Information     Expected   8/30/2022     Arrival   8/30/2022 23:49    Acuity   Urgent            Means of arrival   Ambulance    Escorted by   [facility]    Service   Hospitalist    Admission type   Urgent            Arrival complaint   Lethargy           Chief Complaint   Patient presents with   • Fatigue     Patient from [address]  [place] reports increased lethargy over the last couple of days with diarrhea  Patient is awake, alert, and oriented x4  Pt does report having diarrhea  Fever of 100 6 for EMS       Initial Presentation: 76 y o  female with PMH of HTN, GERD, iron deficiency anemia presented to the ED from home via EMS wih increasing lethargy and weakness secondary to diarrhea x 2 days  Pt was recently admitted last month d/t septic shock secondary to PICC line related bacteremia  Gram-positive bacteremia grew methicillin sensitive Staphylococcus aureus  She has been on TPN  Diarrhea thought was d/t malabsorption and given po vanco x 3 days s/p finishing systemic abx up to 8/8  In the ED, WBC 31 68  Procal 0 54  CT abdomen showed diffuse colitis and thickening of the bladder consistent with cystitis    Urinalysis shows presence of bacteria and leukocytes  Started on cefepime and po vancomycin  On exam, aaox3 w/ generalized weakness  Abdomen soft, normal bowel sounds, nt, nd     Plan: Inpatient admission for evaluation and treatment of Colitis, ambulatory dysfunction, NOAH, acute cystitis, elevated LFT's : C diff study ordered  Cont po Vanco  ID consult  GI Consult  Recheck CBC w/ diff  PT eval  IVF  Cont Cefepime  08/31 ID Consult: Sepsis, colitis, cystitis: Plan: continue ceftriaxone for now  continue oral vancomycin for now  follow-up blood cultures and urine cultures  Continue to monitor fever curve, vitals, and WBC  if BP would drop again would recommend broadening the systemic antibiotics to vancomycin cefepime  remove PICC line if blood cultures positive once again  recheck CBC with diff and CMP  serial abdominal exams     Date: 09/01   Day 2:   ID Notes: Pt is irritated w/ a lot of people coming in and out of her room, She is difficult to follow in conversation and often appears confused  She reports mild nausea and abdominal pain  + Cdiff, + PCR, neg EIA  She has rectal tube in place with liquid, dark brown stool in bag, not overtly bloody  She is still tachypnic  WBC trending down  Creatinine improving  LFT's trending down  1/2 of bld cx now growing staph epidermidis, likely a contaminant  Ucx with 80k cfu enteric-like GNRs, 40k proteus, and 40k enterococcus  She reports dysuria  Restart Ceftriaxone  Stop Vanco  Repeat Bld cx today, f/u results  Cont to mon fever curve, vitals and WBC  On exam, aaox4, tachypnic, crackles at b/l bases present  ED Triage Vitals   Temperature Pulse Respirations Blood Pressure SpO2   08/30/22 2355 08/30/22 2355 08/30/22 2355 08/30/22 2355 08/30/22 2355   99 6 °F (37 6 °C) 101 20 116/56 99 %      Temp Source Heart Rate Source Patient Position - Orthostatic VS BP Location FiO2 (%)   08/30/22 2355 08/30/22 2355 08/30/22 2355 08/30/22 2355 --   Oral Monitor; Apical Lying Left arm       Pain Score 08/31/22 0153       Med Not Given for Pain - for MAR use only          Wt Readings from Last 1 Encounters:   08/09/22 59 6 kg (131 lb 6 4 oz)     Additional Vital Signs:   Date/Time Temp Pulse Resp BP MAP (mmHg) SpO2 O2 Device Patient Position - Orthostatic VS   09/01/22 0831 97 5 °F (36 4 °C) 74 20 94/50 68 97 % None (Room air) Lying   08/31/22 2200 97 9 °F (36 6 °C) 86 18 107/53 74 94 % None (Room air) Lying   08/31/22 2035 -- -- -- -- -- -- None (Room air) --   08/31/22 1530 97 7 °F (36 5 °C) 82 20 98/51 70 96 % None (Room air) Lying   08/31/22 1415 97 3 °F (36 3 °C) Abnormal  76 18 103/51 73 96 % None (Room air) Lying   08/31/22 1200 -- 76 22 101/41 Abnormal  61 Abnormal  97 % None (Room air) Sitting   08/31/22 1115 -- 68 24 Abnormal  95/50 62 Abnormal  97 % None (Room air) Lying   08/31/22 1000 -- 68 24 Abnormal  101/46 Abnormal  60 Abnormal  100 % None (Room air) Lying   08/31/22 0920 -- 73 20 98/52 -- 96 % None (Room air) Lying   08/31/22 0715 -- 76 20 100/57 72 96 % None (Room air) Lying   08/31/22 0500 -- 78 18 103/51 61 Abnormal  95 % None (Room air) --   08/31/22 0400 -- 86 20 110/56 69 94 % None (Room air) --   08/31/22 0300 -- 90 20 107/73 92 95 % None (Room air) --   08/31/22 0215 -- 96 20 115/57 73 93 % None (Room air) Lying   08/31/22 0118 101 4 °F (38 6 °C) Abnormal  -- -- -- -- -- -- --   08/31/22 0013 -- -- -- -- -- -- None (Room air) --     Pertinent Labs/Diagnostic Test Results:   CT abdomen pelvis with contrast   Final Result by Sonam Mejia MD (08/31 8548)      1  Diffuse wall thickening of the colon with surrounding fat stranding suspicious for colitis i e  infectious or inflammatory  2   Mild circumferential wall thickening of the urinary bladder  Correlate with urinalysis for possible cystitis  3   Cholelithiasis  The study was marked in [facility] for immediate notification        Workstation performed: [id number]           08/31 EKG result: NSR    Results from last 7 days   Lab Units 08/31/22  0206   SARS-COV-2  Negative     Results from last 7 days   Lab Units 09/01/22  0618 08/31/22  0206   WBC Thousand/uL 14 01* 31 68*   HEMOGLOBIN g/dL 6 4* 8 2*   HEMATOCRIT % 20 9* 27 3*   PLATELETS Thousands/uL 481* 534*   NEUTROS ABS Thousands/µL 9 37* 24 81*         Results from last 7 days   Lab Units 09/01/22  0618 08/31/22  0212   SODIUM mmol/L 139 138   POTASSIUM mmol/L 3 6 4 0   CHLORIDE mmol/L 112* 109*   CO2 mmol/L 21 21   ANION GAP mmol/L 6 8   BUN mg/dL 36* 43*   CREATININE mg/dL 1 19 1 35*   EGFR ml/min/1 73sq m 43 37   CALCIUM mg/dL 8 2* 8 6   MAGNESIUM mg/dL 2 4  --    PHOSPHORUS mg/dL 4 4*  --      Results from last 7 days   Lab Units 09/01/22  0618 08/31/22  0212   AST U/L 53* 115*   ALT U/L 115* 183*   ALK PHOS U/L 149* 193*   TOTAL PROTEIN g/dL 6 3* 6 9   ALBUMIN g/dL 1 4* 1 7*   TOTAL BILIRUBIN mg/dL 0 18* 0 37         Results from last 7 days   Lab Units 09/01/22  0618 08/31/22  0212   GLUCOSE RANDOM mg/dL 106 96     Results from last 7 days   Lab Units 08/31/22  0212   PROTIME seconds 15 6*   INR  1 21*   PTT seconds 34         Results from last 7 days   Lab Units 08/31/22  0206   PROCALCITONIN ng/ml 0 54*     Results from last 7 days   Lab Units 08/31/22  0415 08/31/22  0212   LACTIC ACID mmol/L 0 9 1 0     Results from last 7 days   Lab Units 08/31/22  0919   HEP B S AG  Non-reactive   HEP C AB  Non-reactive   HEP B C IGM  Non-reactive     Results from last 7 days   Lab Units 08/31/22  0212   LIPASE u/L 140                 Results from last 7 days   Lab Units 08/31/22  0501   CLARITY UA  Extra Turbid   COLOR UA  Light Orange   SPEC GRAV UA  1 012   PH UA  8 0   GLUCOSE UA mg/dl Negative   KETONES UA mg/dl Negative   BLOOD UA  Small*   PROTEIN UA mg/dl 100 (2+)*   NITRITE UA  Positive*   BILIRUBIN UA  Negative   UROBILINOGEN UA (BE) mg/dl <2 0   LEUKOCYTES UA  Large*   WBC UA /hpf Innumerable*   RBC UA /hpf Innumerable*   BACTERIA UA /hpf Innumerable*   EPITHELIAL CELLS WET PREP /hpf None Seen   MUCUS THREADS  Occasional*                     Results from last 7 days   Lab Units 08/31/22  0635   C DIFF TOXIN B BY PCR  Positive*             Results from last 7 days   Lab Units 08/31/22  0501 08/31/22  0206   BLOOD CULTURE   --  No Growth at 24 hrs  Received in Microbiology Lab  Culture in Progress     URINE CULTURE  80,000-89,000 cfu/ml - 2 strains Gram Negative Jerome Enteric Like*  40,000-49,000 cfu/ml Proteus species*  40,000-49,000 cfu/ml Enterococcus species*  --                ED Treatment:   Medication Administration from 08/30/2022 2348 to 08/31/2022 1344       Date/Time Order Dose Route Action     08/31/2022 0153 acetaminophen (TYLENOL) tablet 975 mg 975 mg Oral Given     08/31/2022 0320 cefepime (MAXIPIME) 2 g/50 mL dextrose IVPB 0 mg Intravenous Stopped     08/31/2022 0215 cefepime (MAXIPIME) 2 g/50 mL dextrose IVPB 2,000 mg Intravenous New Bag     08/31/2022 0228 vancomycin (VANCOCIN) oral solution 125 mg 125 mg Oral Given     08/31/2022 0320 sodium chloride 0 9 % bolus 1,000 mL 0 mL Intravenous Stopped     08/31/2022 0214 sodium chloride 0 9 % bolus 1,000 mL 1,000 mL Intravenous New Bag     08/31/2022 0301 iohexol (OMNIPAQUE) 240 MG/ML solution 50 mL 50 mL Oral Given     08/31/2022 0449 iohexol (OMNIPAQUE) 350 MG/ML injection (SINGLE-DOSE) 100 mL 80 mL Intravenous Given     17/40/6627 9079 folic acid (FOLVITE) tablet 1 mg 1 mg Oral Given     08/31/2022 0921 levothyroxine tablet 112 mcg 112 mcg Oral Given     08/31/2022 1216 midodrine (PROAMATINE) tablet 5 mg 0 mg Oral Hold     08/31/2022 1215 midodrine (PROAMATINE) tablet 5 mg 5 mg Oral Given     08/31/2022 0921 midodrine (PROAMATINE) tablet 5 mg 5 mg Oral Given     08/31/2022 0921 thiamine tablet 100 mg 100 mg Oral Given     08/31/2022 1216 vancomycin (VANCOCIN) oral solution 125 mg 125 mg Oral Given     08/31/2022 0681 multi-electrolyte (PLASMALYTE-A/ISOLYTE-S PH 7 4) IV solution 100 mL/hr Intravenous New Bag 08/31/2022 0636 heparin (porcine) subcutaneous injection 5,000 Units 5,000 Units Subcutaneous Given        Past Medical History:   Diagnosis Date   • Anemia    • Anxiety    • Bipolar disorder (HCC)    • Colon polyp    • Disease of thyroid gland    • Essential hypertension    • Essential tremor    • Fibromyalgia, primary    • GERD (gastroesophageal reflux disease)    • Inflammatory polyarthropathy (HCC)    • Mammogram abnormal    • Pressure injury of skin      Present on Admission:  • Ambulatory dysfunction  • Depression  • Hypothyroidism  • Gram-positive bacteremia      Admitting Diagnosis: Diarrhea of presumed infectious origin [R19 7]  Fatigue [R53 83]  C  difficile diarrhea [A04 72]  Sepsis ([facility] [address] ) [A41 9]  Age/Sex: 76 y o  female  Admission Orders:  SCD  Daily weights  I/O    Scheduled Medications:  clonazePAM, 3 mg, Oral, HS  folic acid, 1 mg, Oral, Daily  heparin (porcine), 5,000 Units, Subcutaneous, Q8H [facility]  levothyroxine, 112 mcg, Oral, Early Morning  midodrine, 5 mg, Oral, TID AC  thiamine, 100 mg, Oral, Daily  traZODone, 150 mg, Oral, HS  vancomycin, 15 mg/kg, Intravenous, Q24H  vancomycin, 125 mg, Oral, Q6H [facility]      Continuous IV Infusions:  Adult TPN (STANDARD BASE/STANDARD ELECTROLYTE), , Intravenous, Continuous TPN  multi-electrolyte, 100 mL/hr, Intravenous, Continuous      PRN Meds:  acetaminophen, 650 mg, Oral, Q6H PRN  ondansetron, 4 mg, Intravenous, Q6H PRN        IP CONSULT TO CASE MANAGEMENT  IP CONSULT TO INFECTIOUS DISEASES  IP CONSULT TO NUTRITION SERVICES  IP CONSULT TO PHARMACY  IP CONSULT TO INFECTIOUS DISEASES    Network Utilization Review Department  ATTENTION: Please call with any questions or concerns to [phone] and carefully listen to the prompts so that you are directed to the right person   All voicemails are confidential   Ayo Schaefer all requests for admission clinical reviews, approved or denied determinations and any other requests to dedicated fax number below belonging to the campus where the patient is receiving treatment   List of dedicated fax numbers for the Facilities:  [address] [place] [address] DENIALS (Administrative/Medical Necessity) [phone]   [address] (Maternity/NICU/Pediatrics) [phone]   [address] [phone]   [address] [phone]   [address] [facility] [address] [address] [address] [phone]   [facility] [phone]   [address] [phone]     '

## 2022-09-01 NOTE — PLAN OF CARE
Problem: PHYSICAL THERAPY ADULT  Goal: Performs mobility at highest level of function for planned discharge setting  See evaluation for individualized goals  Description:    Equipment Recommended:  (RW)       See flowsheet documentation for full assessment, interventions and recommendations  Note: Prognosis: Fair  Problem List: Decreased strength, Decreased endurance, Impaired balance, Decreased mobility, Decreased coordination, Decreased cognition, Impaired judgement, Decreased safety awareness, Decreased skin integrity  Pt is 76 y o  female seen for PT evaluation s/p admit to [facility] on 8/30/2022  Pt presenting from [address] rehab w/ 2 days diarrhea + abdominal pain; weakness and lethargy  Current dx/ problem list includes; colitis; c-diff diarrhea; fatigue and ambulatory dysfunction  Pt   has a past medical history of Anemia, Anxiety, Bipolar disorder ([facility] [address] ), Colon polyp, Disease of thyroid gland, Essential hypertension, Essential tremor, Fibromyalgia, primary, GERD (gastroesophageal reflux disease), Inflammatory polyarthropathy ([facility] [address] ), Mammogram abnormal, and Pressure injury of skin  Due to acute medical issues, ongoing medical workup/ management for primary dx; pain, fall risk, cognitive impairment; impaired safety awareness decreased activity tolerance compared to baseline, increased assistance needed from caregiver at current time, continuous  monitoring, multiple lines/ TPN; rectal tube,  decline in overall functional mobility status; limited baseline functional mobility;  health management issues; hospital readmission- note unstable clinical picture (high complexity)   Prior to this admission, pt was receiving rehab at [address] [place] [address]- reports was working w/ PT/OT on ambulation w/ RW + ADL's w/ Ax1  Pt somewhat limited historian nd could not specify ambulation dist acnes  Pt does reprot at baseline- living alone in a 3SH w/ 1 WILLI and 1* bed and bath on 2nd floor    Pt reports being indep;at baseline  Pt does become agitated w/ questioning and required redirection from PT/OT  Currently pt  is requiring Paulo for bed skills; Paulo for functional transfers and Paulo for ambulation w/ RW ~3' to chair w/ distances self limited by pt  Pt presents functioning below baseline and currently w/ overall mobility deficits 2* to: decreased LE strength/AROM; severe deconditioning; generalized weakness;  decreased endurance; decreased activity tolerance; decreased coordination; impaired balance; gait deviations; decreased safety awareness; impaired cognition;  SOB/DELUNA; fatigue; impaired safety and judgement; limited insight into current deficits; bed/ chair alarms; multiple lines; frail; incontinent   Pt currently at risk for falls  (Please find additional objective findings from PT assessment regarding body systems outlined above ) Pt will continue to benefit from skilled PT interventions on a 2-3x week basis to address stated impairments; to maximize functional potential; for ongoing pt/ family training; and DME needs  PT is currently recommending return to rehab w/ PT/OT evals on return  Anticipate pt will require transition to LTC/ increased level of care/ assist as long term plan given history and ongoing deficits  PT Discharge Recommendation: Return to facility with rehabilitation services    See flowsheet documentation for full assessment

## 2022-09-01 NOTE — ASSESSMENT & PLAN NOTE
Possibly secondary to increased losses from the GI tract due to diarrhea vs GI Bleed  · Cr trended down  · Hold IVF  · Avoid nephrotoxins and renally dose meds

## 2022-09-01 NOTE — PLAN OF CARE
Problem: OCCUPATIONAL THERAPY ADULT  Goal: Performs self-care activities at highest level of function for planned discharge setting  See evaluation for individualized goals  Description: Treatment Interventions: ADL retraining, Functional transfer training, Endurance training, UE strengthening/ROM, Cognitive reorientation, Patient/family training, Equipment evaluation/education, Compensatory technique education, Continued evaluation, Energy conservation, Activityengagement          See flowsheet documentation for full assessment, interventions and recommendations  Note: Limitation: Decreased ADL status, Decreased endurance, Decreased self-care trans, Decreased high-level ADLs  Prognosis: Fair  Assessment: Pt is a 76 y o  female admitted to [facility] on 8/30/2022 w/ colitis  Pt  has a past medical history of Anemia, Anxiety, Bipolar disorder ([facility] ), Colon polyp, Disease of thyroid gland, Essential hypertension, Essential tremor, Fibromyalgia, primary, GERD (gastroesophageal reflux disease), Inflammatory polyarthropathy ([facility] ), Mammogram abnormal, and Pressure injury of skin  Pt with active OT orders and up and OOB as tolerated orders  At baseline, pt lives in a 3 story home alone  Pt presents this admission from rehab  Pt lives alone but has recently been receiving assistance from facility staff At baseline, pt is I with ADLS, IADLS and mobility without device  Currently pt is min A for functional transfers and functional mobility, min A for UB ADLS and mod A for LB ADLS  Pt is limited at this time 2*: endurance, activity tolerance, functional mobility, forward functional reach, functional standing tolerance, decreased I w/ ADLS/IADLS and cognitive impairments  The following Occupational Performance Areas to address include: grooming, bathing/shower, toilet hygiene, dressing, functional mobility and clothing management   Based on the aforementioned OT evaluation, functional performance deficits, and assessments, pt has been identified as a high complexity evaluation  From OT standpoint, anticipate d/c STR  Pt to continue to benefit from acute immediate OT services to address the following goals 3-5x/week to  w/in 10-14 days:        OT Discharge Recommendation: Post acute rehabilitation services (vs LTC)

## 2022-09-01 NOTE — ASSESSMENT & PLAN NOTE
With incidental finding of cholelithiasis however generally the gallbladder is unremarkable on CT A/P    · Patient is also on TPN at baseline   · Trending down

## 2022-09-01 NOTE — ASSESSMENT & PLAN NOTE
· POA; Met SIRs criteria with fever, leukocytosis   · Source with bacteremia, possible C diff colitis  · Infectious disease on board, appreciate input  · + evidence of colitis on ct scan  Status post C diff panel; positive toxin PCR but negative EIA  On p o  Vancomycin  · Positive recurrent bacteremia  Presumed secondary to PICC line  Prior history of recent MSSE  · Pending finalized blood cx for identification and sensitivities  Unfortunately pt w/ poor access thus unable to obtain repeat blood cx today  · On IV vancomycin  · Ur cx polymicrobial but non significant colony ct   DC CTX

## 2022-09-01 NOTE — ASSESSMENT & PLAN NOTE
Malnutrition Findings:   Adult Malnutrition type: Chronic illness  Adult Degree of Malnutrition: Malnutrition of moderate degree  Malnutrition Characteristics: Fat loss, Muscle loss                  360 Statement: Moderate malnutrition in setting of chronic illness related to ongoing GI issues/malabsorption as evidenced by severely sunken orbital lobes, sunken temporal lobes, severely protruding clavicular bone    BMI Findings: Body mass index is 23 17 kg/m²     Hx of Savage-en Y gastric bypass, 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  May have to consider alternate feeding due to recurrent bacteremia  S/p nutrition consult  GI consulted

## 2022-09-01 NOTE — OCCUPATIONAL THERAPY NOTE
Occupational Therapy Evaluation     Patient Name: Jaquelin Rhodes  [id number] Date: 9/1/2022  Problem List  Principal Problem:    Colitis presumed to be due to infection  Active Problems:    Hypothyroidism    Depression    Elevated LFTs    H/O bariatric surgery - bypass    Ambulatory dysfunction    Gram-positive bacteremia    Sepsis ([facility] )    Acute cystitis without hematuria    Acute kidney insufficiency    Past Medical History  Past Medical History:   Diagnosis Date    Anemia     Anxiety     Bipolar disorder ([facility] [facility] )     Colon polyp     Disease of thyroid gland     Essential hypertension     Essential tremor     Fibromyalgia, primary     GERD (gastroesophageal reflux disease)     Inflammatory polyarthropathy (HCC)     Mammogram abnormal     Pressure injury of skin      Past Surgical History  Past Surgical History:   Procedure Laterality Date    BREAST BIOPSY Right 2015    benign    CARPAL TUNNEL RELEASE Bilateral     COLONOSCOPY      EGD AND COLONOSCOPY  01/01/2014    GASTRIC BYPASS  07/01/2012    MAMMO NEEDLE LOCALIZATION RIGHT (ALL INC) Right 2/6/2012    MAMMO NEEDLE LOCALIZATION RIGHT (ALL INC) Right 2/6/2012    ULNAR NERVE TRANSPOSITION Right              09/01/22 0834   OT Last Visit   OT Visit Date 09/01/22   Note Type   Note type Evaluation   Restrictions/Precautions   Other Precautions Contact/isolation;Multiple lines; Chair Alarm;Cognitive; Bed Alarm; Fall Risk  (rectal tube)   Pain Assessment   Pain Assessment Tool 0-10   Pain Score No Pain   Home Living   Type of Home House   Home Layout Multi-level   Bathroom Shower/Tub Tub/shower unit   Bathroom Toilet Standard   Bathroom Equipment Grab bars in shower   Additional Comments At baseline, pt lives in a 3 story home alone  Pt presents this admission from rehab     Prior Function   Level of [place] Independent with ADLs and functional mobility   Lives With (S)  Alone   Lifestyle   Autonomy At baseline, pt is I with ADLS, IADLS and mobility without device  Reciprocal Relationships Pt lives alone but has recently been receiving assistance from facility staff   Service to Others Retired    [address] [facility] "Someone is having a wedding here soon that's why they put these pink things on my feet"   ADL   Where Assessed Edge of bed   Eating Assistance 5  Supervision/Setup   Grooming Assistance 5  Supervision/Setup   UB Bathing Assistance 4  Minimal Assistance   LB Bathing Assistance 3  Moderate Assistance   UB Dressing Assistance 4  Minimal [address] 3  Moderate [address]  3  Moderate Assistance   Bed Mobility   Supine to Sit 4  Minimal assistance   Additional items Assist x 1; Increased time required;LE management   Additional Comments After OT session pt in chair with alarm on and all needs within reach  Transfers   Sit to Stand 4  Minimal assistance   Additional items Assist x 1; Increased time required;Verbal cues   Stand to Sit 4  Minimal assistance   Additional items Assist x 1; Increased time required   Functional Mobility   Functional Mobility 4  Minimal assistance   Additional Comments Pt took a few steps from bed to chair with RW  Pt adamently refusing any further mobility at this time     Additional items Rolling walker   Balance   Static Sitting Fair   Dynamic Sitting Fair -   Static Standing Poor +   Dynamic Standing Poor +   Ambulatory Poor +   Activity Tolerance   Activity Tolerance Patient limited by fatigue;Treatment limited secondary to medical complications (Comment)  (cognition)   Medical Staff Made Aware Seen with PT 2* medical complexity/ multiple comorbidities   Nurse Made Aware RN confirmed okay to see pt   Cognition   Overall Cognitive Status Impaired   Arousal/Participation Alert   Attention Attends with cues to redirect   Orientation Level Oriented to person;Oriented to place   Memory Decreased recall of precautions;Decreased recall of recent events   Following Commands Follows one step commands with increased time or repetition   Comments Pt is emotionally labile and very easily agitated  Requires encouragement to participate throughout  Assessment   Limitation Decreased ADL status; Decreased endurance;Decreased self-care trans;Decreased high-level ADLs   Prognosis Fair   Assessment Pt is a 76 y o  female admitted to [facility] on [facility] w/ colitis  Pt  has a past medical history of Anemia, Anxiety, Bipolar disorder ([facility] ), Colon polyp, Disease of thyroid gland, Essential hypertension, Essential tremor, Fibromyalgia, primary, GERD (gastroesophageal reflux disease), Inflammatory polyarthropathy ([facility] ), Mammogram abnormal, and Pressure injury of skin  Pt with active OT orders and up and OOB as tolerated orders  At baseline, pt lives in a 3 story home alone  Pt presents this admission from rehab  Pt lives alone but has recently been receiving assistance from facility staff At baseline, pt is I with ADLS, IADLS and mobility without device  Currently pt is min A for functional transfers and functional mobility, min A for UB ADLS and mod A for LB ADLS  Pt is limited at this time 2*: endurance, activity tolerance, functional mobility, forward functional reach, functional standing tolerance, decreased I w/ ADLS/IADLS and cognitive impairments  The following Occupational Performance Areas to address include: grooming, bathing/shower, toilet hygiene, dressing, functional mobility and clothing management  Based on the aforementioned OT evaluation, functional performance deficits, and assessments, pt has been identified as a high complexity evaluation  From OT standpoint, anticipate d/c STR  Pt to continue to benefit from acute immediate OT services to address the following goals 3-5x/week to  w/in 10-14 days: Ibeth Crate    Goals   Patient Goals To eat breakfast   LTG Time Frame 10-   Long Term Goal #1 See goals below   Plan   Treatment Interventions ADL retraining;Functional transfer training; Endurance training;UE strengthening/ROM; Cognitive reorientation;Patient/family training;Equipment evaluation/education; Compensatory technique education;Continued evaluation; Energy conservation; Activityengagement   Goal Expiration Date 09/15/22   OT Frequency 3-5x/wk   Recommendation   OT Discharge Recommendation Post acute rehabilitation services  (vs LTC)   AM-PAC Daily Activity Inpatient   Lower Body Dressing 2   Bathing 2   Toileting 2   Upper Body Dressing 3   Grooming 3   Eating 4   Daily Activity Raw Score 16   Daily Activity Standardized Score (Calc for Raw Score >=11) 35 96   AM-PAC Applied Cognition Inpatient   Following a Speech/Presentation 2   Understanding Ordinary Conversation 3   Taking Medications 2   Remembering Where Things Are Placed or Put Away 2   Remembering List of 4-5 Errands 2   Taking Care of Complicated Tasks 1   Applied Cognition Raw Score 12   Applied Cognition Standardized Score 28 82   Modified Callahan Scale   Modified Callahan Scale 4       GOALS    1) Pt will increase activity tolerance to G for 30 min txment sessions    2) Pt will complete UB/LB dressing/self care w/ mod I using adaptive device and DME as needed    3) Pt will complete bathing w/ Mod I w/ use of AE and DME as needed    4) Pt will complete toileting w/ mod I w/ G hygiene/thoroughness using DME as needed    5) Pt will improve functional transfers to Mod I on/off all surfaces using DME as needed w/ G balance/safety     6) Pt will improve functional mobility during ADL/IADL/leisure tasks to Mod I using DME as needed w/ G balance/safety     7) Pt will participate in simulated IADL management task with DME as needed to increase independence to  w/ G safety and endurance    8) Pt will demonstrate G carryover of pt/caregiver education and training as appropriate      9) Pt will demonstrate 100% carryover of energy conservation techniques t/o functional I/ADL/leisure tasks w/o cues s/p skilled education    10) Pt will independently identify and utilize 2-3 coping strategies to increase positive affect and promote overall well-being      11) Pt will engage in ongoing cognitive assessment w/ G participation to assist w/ safe d/c planning/recommendations    CARLOS Calvert, OTR/L

## 2022-09-01 NOTE — NURSING NOTE
Blood cultures ordered  RN and PCA both attempted twice  STAT RN was contacted for ultrasound guidance which was unsuccessful   SLIM made aware

## 2022-09-01 NOTE — QUICK NOTE
RN notified by lab 1/2 BC from 8/31 + for GPC in clusters  Not reflected in epic results  Called lab to confirm results myself  Start IV vanco, await second blood culture result  Doubt contaminant given sepsis a/e/b fevers and leukocytosis, note also hx of MSSA  Consult ID

## 2022-09-01 NOTE — PLAN OF CARE
Problem: Potential for Falls  Goal: Patient will remain free of falls  Description: INTERVENTIONS:  - Educate patient/family on patient safety including physical limitations  - Instruct patient to call for assistance with activity   - Consult OT/PT to assist with strengthening/mobility   - Keep Call bell within reach  - Keep bed low and locked with side rails adjusted as appropriate  - Keep care items and personal belongings within reach  - Initiate and maintain comfort rounds  - Make Fall Risk Sign visible to staff  - Offer Toileting every 2 Hours, in advance of need  - Initiate/Maintain bed alarm  - Obtain necessary fall risk management equipment  - Apply yellow socks and bracelet for high fall risk patients  - Consider moving patient to room near nurses station  Outcome: Progressing     Problem: MOBILITY - ADULT  Goal: Maintain or return to baseline ADL function  Description: INTERVENTIONS:  -  Assess patient's ability to carry out ADLs; assess patient's baseline for ADL function and identify physical deficits which impact ability to perform ADLs (bathing, care of mouth/teeth, toileting, grooming, dressing, etc )  - Assess/evaluate cause of self-care deficits   - Assess range of motion  - Assess patient's mobility; develop plan if impaired  - Assess patient's need for assistive devices and provide as appropriate  - Encourage maximum independence but intervene and supervise when necessary  - Involve family in performance of ADLs  - Assess for home care needs following discharge   - Consider OT consult to assist with ADL evaluation and planning for discharge  - Provide patient education as appropriate  Outcome: Progressing  Goal: Maintains/Returns to pre admission functional level  Description: INTERVENTIONS:  - Perform BMAT or MOVE assessment daily    - Set and communicate daily mobility goal to care team and patient/family/caregiver     - Collaborate with rehabilitation services on mobility goals if consulted  - Perform Range of Motion 4 times a day  - Reposition patient every 2 hours    - Dangle patient 4 times a day  - Stand patient 3 times a day  - Ambulate patient 3 times a day  - Out of bed to chair 3 times a day   - Out of bed for meals 3 times a day  - Out of bed for toileting  - Record patient progress and toleration of activity level   Outcome: Progressing     Problem: INFECTION - ADULT  Goal: Absence or prevention of progression during hospitalization  Description: INTERVENTIONS:  - Assess and monitor for signs and symptoms of infection  - Monitor lab/diagnostic results  - Monitor all insertion sites, i e  indwelling lines, tubes, and drains  - Monitor endotracheal if appropriate and nasal secretions for changes in amount and color  - [place] appropriate cooling/warming therapies per order  - Administer medications as ordered  - Instruct and encourage patient and family to use good hand hygiene technique  - Identify and instruct in appropriate isolation precautions for identified infection/condition  Outcome: Progressing  Goal: Absence of fever/infection during neutropenic period  Description: INTERVENTIONS:  - Monitor WBC    Outcome: Progressing     Problem: SAFETY ADULT  Goal: Patient will remain free of falls  Description: INTERVENTIONS:  - Educate patient/family on patient safety including physical limitations  - Instruct patient to call for assistance with activity   - Consult OT/PT to assist with strengthening/mobility   - Keep Call bell within reach  - Keep bed low and locked with side rails adjusted as appropriate  - Keep care items and personal belongings within reach  - Initiate and maintain comfort rounds  - Make Fall Risk Sign visible to staff  - Offer Toileting every 2 Hours, in advance of need  - Initiate/Maintain bed alarm  - Obtain necessary fall risk management equipment  - Apply yellow socks and bracelet for high fall risk patients  - Consider moving patient to room near nurses station  Outcome: Progressing  Goal: Maintain or return to baseline ADL function  Description: INTERVENTIONS:  -  Assess patient's ability to carry out ADLs; assess patient's baseline for ADL function and identify physical deficits which impact ability to perform ADLs (bathing, care of mouth/teeth, toileting, grooming, dressing, etc )  - Assess/evaluate cause of self-care deficits   - Assess range of motion  - Assess patient's mobility; develop plan if impaired  - Assess patient's need for assistive devices and provide as appropriate  - Encourage maximum independence but intervene and supervise when necessary  - Involve family in performance of ADLs  - Assess for home care needs following discharge   - Consider OT consult to assist with ADL evaluation and planning for discharge  - Provide patient education as appropriate  Outcome: Progressing  Goal: Maintains/Returns to pre admission functional level  Description: INTERVENTIONS:  - Perform BMAT or MOVE assessment daily    - Set and communicate daily mobility goal to care team and patient/family/caregiver  - Collaborate with rehabilitation services on mobility goals if consulted  - Perform Range of Motion 4 times a day  - Reposition patient every 2 hours    - Dangle patient 4 times a day  - Stand patient 3 times a day  - Ambulate patient 3 times a day  - Out of bed to chair 3 times a day   - Out of bed for meals 3 times a day  - Out of bed for toileting  - Record patient progress and toleration of activity level   Outcome: Progressing     Problem: DISCHARGE PLANNING  Goal: Discharge to home or other facility with appropriate resources  Description: INTERVENTIONS:  - Identify barriers to discharge w/patient and caregiver  - Arrange for needed discharge resources and transportation as appropriate  - Identify discharge learning needs (meds, wound care, etc )  - Arrange for interpretive services to assist at discharge as needed  - Refer to Case Management Department for coordinating discharge planning if the patient needs post-hospital services based on physician/advanced practitioner order or complex needs related to functional status, cognitive ability, or social support system  Outcome: Progressing     Problem: Knowledge Deficit  Goal: Patient/family/caregiver demonstrates understanding of disease process, treatment plan, medications, and discharge instructions  Description: Complete learning assessment and assess knowledge base    Interventions:  - Provide teaching at level of understanding  - Provide teaching via preferred learning methods  Outcome: Progressing     Problem: SKIN/TISSUE INTEGRITY - ADULT  Goal: Skin Integrity remains intact(Skin Breakdown Prevention)  Description: Assess:  -Perform Gabe assessment  -Clean and moisturize skin  -Inspect skin when repositioning, toileting, and assisting with ADLS  -Assess under medical devices   -Assess extremities for adequate circulation and sensation     Bed Management:  -Have minimal linens on bed & keep smooth, unwrinkled  -Change linens as needed when moist or perspiring  -Avoid sitting or lying in one position for more than 2 hours while in bed  -Keep HOB at 30 degrees     Toileting:  -Offer bedside commode  -Assess for incontinence   -Use incontinent care products after each incontinent episode     Activity:  -Mobilize patient 4 times a day  -Encourage activity and walks on unit  -Encourage or provide ROM exercises   -Turn and reposition patient every 2 Hours  -Use appropriate equipment to lift or move patient in bed  -Instruct/ Assist with weight shifting every hour when out of bed in chair    Skin Care:  -Avoid use of baby powder, tape, friction and shearing, hot water or constrictive clothing  -Relieve pressure over bony prominences  -Do not massage red bony areas    Next Steps:  -Teach patient strategies to minimize risks   -Consider consults to  interdisciplinary teams  Outcome: Progressing  Goal: Incision(s), wounds(s) or drain site(s) healing without S/S of infection  Description: INTERVENTIONS  - Assess and document dressing, incision, wound bed, drain sites and surrounding tissue  - Provide patient and family education  - Perform skin care/dressing changes  Outcome: Progressing  Goal: Pressure injury heals and does not worsen  Description: Interventions:  - Implement low air loss mattress or specialty surface (Criteria met)  - Apply silicone foam dressing  - Instruct/assist with weight shifting every 30 minutes when in chair   - Limit chair time to 2 hour intervals  - Use special pressure reducing interventions   - Apply fecal or urinary incontinence containment device   - Perform passive or active ROM  - Turn and reposition patient & offload bony prominences   - Utilize friction reducing device or surface for transfers   - Consider consults to  interdisciplinary teams   - Use incontinent care products after each incontinent episode  - Consider nutrition services referral as needed  Outcome: Progressing     Problem: Prexisting or High Potential for Compromised Skin Integrity  Goal: Skin integrity is maintained or improved  Description: INTERVENTIONS:  - Identify patients at risk for skin breakdown  - Assess and monitor skin integrity  - Assess and monitor nutrition and hydration status  - Monitor labs   - Assess for incontinence   - Turn and reposition patient  - Assist with mobility/ambulation  - Relieve pressure over bony prominences  - Avoid friction and shearing  - Provide appropriate hygiene as needed including keeping skin clean and dry  - Evaluate need for skin moisturizer/barrier cream  - Collaborate with interdisciplinary team   - Patient/family teaching  - Consider wound care consult   Outcome: Progressing     Problem: Nutrition/Hydration-ADULT  Goal: Nutrient/Hydration intake appropriate for improving, restoring or maintaining nutritional needs  Description: Monitor and assess patient's nutrition/hydration status for malnutrition  Collaborate with interdisciplinary team and initiate plan and interventions as ordered  Monitor patient's weight and dietary intake as ordered or per policy  Utilize nutrition screening tool and intervene as necessary  Determine patient's food preferences and provide high-protein, high-caloric foods as appropriate       INTERVENTIONS:  - Monitor oral intake, urinary output, labs, and treatment plans  - Assess nutrition and hydration status and recommend course of action  - Evaluate amount of meals eaten  - Assist patient with eating if necessary   - Allow adequate time for meals  - Recommend/ encourage appropriate diets, oral nutritional supplements, and vitamin/mineral supplements  - Order, calculate, and assess calorie counts as needed  - Recommend, monitor, and adjust tube feedings and TPN/PPN based on assessed needs  - Assess need for intravenous fluids  - Provide specific nutrition/hydration education as appropriate  - Include patient/family/caregiver in decisions related to nutrition  Outcome: Progressing

## 2022-09-01 NOTE — PROGRESS NOTES
[address] [facility]  Progress Note - Leigh Ann Gill 1945, 76 y o  female MRN: [id number]  Unit/Bed#: [facility] 317-01 Encounter: [id number]  Primary Care Provider: Cecilia Molina MD   Date and time admitted to hospital: 8/30/2022 11:49 PM    * Sepsis [facility])  Assessment & Plan  · POA; Met SIRs criteria with fever, leukocytosis   · Source with bacteremia, possible C diff colitis  · Infectious disease on board, appreciate input  · + evidence of colitis on ct scan  Status post C diff panel; positive toxin PCR but negative EIA  On p o  Vancomycin  · Positive recurrent bacteremia  Presumed secondary to PICC line  Prior history of recent MSSE  · Pending finalized blood cx for identification and sensitivities  Unfortunately pt w/ poor access thus unable to obtain repeat blood cx today  · On IV vancomycin  · Ur cx polymicrobial but non significant colony ct  DC CTX       Bacteremia  Assessment & Plan  · Recent hx of staph epi bacteremia in 7/2022, presumed due to picc line  · Now with possible recurrence 1/2 set staph epi, 2nd set no growth thus far  · ID on board  · On IV vancomycin  · May need to have PICC removed  But will leave in place for now given poor IV access, ok per ID  · Cardiology consulted, will need TAVIA  S/p TTE in 7/2022  · Unfortunately unable to obtain rpt blood cx today  · GI consulted as will need alternate form of feeding  H/O bariatric surgery - bypass  Assessment & Plan  · Patient with history of gastric bypass surgery  Has had significant malnutrition issues and anastomotic ulcerations  Seen by Bariatric Service during recent hospitalization at [facility] who recommended prolonged course of TPN for nutritional optimization and possible revision surgery in the future  · Was planned to have appt with bariatric sx on 8/20 however had to be rescheduled  · Given recurrent bacteremia will need alternate form of feeding   Thus GI consulted for consideration of J Tube  · For now cont on TPN while PICC remains in place    Acute on chronic anemia  Assessment & Plan  · Checking stool occult  + Rectal tube in place  · 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  · S/p recent EGD in 7/22 revealing: Residual marginal ulcer of the gastrojejunostomy anastomosis with ijmproved size  · PRBC transfusion x 1 now  · Clear diet now, NPO after midnight  · Place on IV PPI BID  · Cont to monitor H/H    Moderate protein-calorie malnutrition ([address] )  Assessment & Plan  Malnutrition Findings:   Adult Malnutrition type: Chronic illness  Adult Degree of Malnutrition: Malnutrition of moderate degree  Malnutrition Characteristics: Fat loss, Muscle loss                  360 Statement: Moderate malnutrition in setting of chronic illness related to ongoing GI issues/malabsorption as evidenced by severely sunken orbital lobes, sunken temporal lobes, severely protruding clavicular bone    BMI Findings: Body mass index is 23 17 kg/m²  Hx of Savage-en Y gastric bypass, 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  May have to consider alternate feeding due to recurrent bacteremia  S/p nutrition consult  GI consulted    Goals of care, counseling/discussion  Assessment & Plan  · Attempt to discuss goals of care but pt not receptive and stated wishing to be txt focused for now  · Per her sister, she feels pt has essentially "given up"  · Will attempt again tomorrow and hopefully pt is agreeable to palliative consult    Acute kidney insufficiency  Assessment & Plan  Possibly secondary to increased losses from the GI tract due to diarrhea vs GI Bleed  · Cr trended down  · Hold IVF  · Avoid nephrotoxins and renally dose meds    Ambulatory dysfunction  Assessment & Plan  · Exacerbated by diarrhea/dehydration however patient is chronically malnourished     · S/p PT/OT evaluation; to return to SNF    Elevated LFTs  Assessment & Plan  With incidental finding of cholelithiasis however generally the gallbladder is unremarkable on CT A/P    · Patient is also on TPN at baseline   · Trending down    Depression  Assessment & Plan  · On trazodone  Hypothyroidism  Assessment & Plan  · Continue levothyroxine     VTE Pharmacologic Prophylaxis:   Pharmacologic: Pharmacologic VTE Prophylaxis contraindicated due to Secondary to anemia  Mechanical VTE Prophylaxis in Place: No    Patient Centered Rounds: I have performed bedside rounds with nursing staff today  Discussions with Specialists or Other Care Team Provider:  Infectious disease    Education and Discussions with Family / Patient:  Patient  Also called her sister Home and updated at length    Time Spent for Care: 45 minutes  More than 50% of total time spent on counseling and coordination of care as described above  Current Length of Stay: 1 day(s)    Current Patient Status: Inpatient   Certification Statement: The patient will continue to require additional inpatient hospital stay due to acute illness, sepsis, anemia, bacteremia    Discharge Plan:     Code Status: Level 1 - Full Code      Subjective:   Patient seen and examined at bedside  Diarrhea persist   Denies abdominal pain  No recurring fever  Not really cooperative with discussion of history and plan of care  Objective:     Vitals:   Temp (24hrs), Av 7 °F (36 5 °C), Min:97 4 °F (36 3 °C), Max:98 1 °F (36 7 °C)    Temp:  [97 4 °F (36 3 °C)-98 1 °F (36 7 °C)] 97 7 °F (36 5 °C)  HR:  [73-86] 73  Resp:  [12-23] 20  BP: ()/(50-64) 122/64  SpO2:  [94 %-98 %] 98 %  Body mass index is 23 17 kg/m²  Input and Output Summary (last 24 hours): Intake/Output Summary (Last 24 hours) at 2022  Last data filed at 2022 1738  Gross per 24 hour   Intake 3816 67 ml   Output 500 ml   Net 3316 67 ml       Physical Exam:     Physical Exam  Constitutional:       Comments: Sleeping but easily arousable    Alert oriented x3  Cachectic     Cardiovascular:      Rate and Rhythm: Normal rate and regular rhythm  Pulses: Normal pulses  Heart sounds: Normal heart sounds  No murmur heard  Pulmonary:      Effort: Pulmonary effort is normal  No respiratory distress  Breath sounds: Normal breath sounds  No wheezing or rales  Abdominal:      General: Abdomen is flat  Bowel sounds are normal  There is no distension  Palpations: Abdomen is soft  Tenderness: There is no abdominal tenderness  There is no guarding  Musculoskeletal:         General: Normal range of motion  Right lower leg: No edema  Left lower leg: No edema  Skin:     General: Skin is warm and dry  Neurological:      General: No focal deficit present  Mental Status: She is oriented to person, place, and time  Mental status is at baseline  Cranial Nerves: No cranial nerve deficit  Motor: No weakness  Additional Data:     Labs:    Results from last 7 days   Lab Units 09/01/22  1204 09/01/22  1203 09/01/22  0618   WBC Thousand/uL  --   --  14 01*   HEMOGLOBIN g/dL  --  6 1* 6 4*   HEMATOCRIT %  --  19 1* 20 9*   PLATELETS Thousands/uL 448*  --  481*   NEUTROS PCT %  --   --  67   LYMPHS PCT %  --   --  13*   MONOS PCT %  --   --  7   EOS PCT %  --   --  11*     Results from last 7 days   Lab Units 09/01/22  0618   SODIUM mmol/L 139   POTASSIUM mmol/L 3 6   CHLORIDE mmol/L 112*   CO2 mmol/L 21   BUN mg/dL 36*   CREATININE mg/dL 1 19   ANION GAP mmol/L 6   CALCIUM mg/dL 8 2*   ALBUMIN g/dL 1 4*   TOTAL BILIRUBIN mg/dL 0 18*   ALK PHOS U/L 149*   ALT U/L 115*   AST U/L 53*   GLUCOSE RANDOM mg/dL 106     Results from last 7 days   Lab Units 08/31/22  0212   INR  1 21*             Results from last 7 days   Lab Units 08/31/22  0415 08/31/22  0212 08/31/22  0206   LACTIC ACID mmol/L 0 9 1 0  --    PROCALCITONIN ng/ml  --   --  0 54*           * I Have Reviewed All Lab Data Listed Above  * Additional Pertinent Lab Tests Reviewed:  All Labs Within Last 24 Hours Reviewed    Imaging:    Imaging Reports Reviewed Today Include:   Imaging Personally Reviewed by Myself Includes:      Recent Cultures (last 7 days):     Results from last 7 days   Lab Units 08/31/22  0635 08/31/22  0501 08/31/22  0206   BLOOD CULTURE   --   --  No Growth at 24 hrs  GRAM STAIN RESULT   --   --  Gram positive cocci in clusters*   URINE CULTURE   --  80,000-89,000 cfu/ml - 2 strains Gram Negative Jerome Enteric Like*  40,000-49,000 cfu/ml Proteus species*  40,000-49,000 cfu/ml Enterococcus species*  --    C DIFF TOXIN B BY PCR  Positive*  --   --        Last 24 Hours Medication List:   Current Facility-Administered Medications   Medication Dose Route Frequency Provider Last Rate   • acetaminophen  650 mg Oral Q6H PRN Vasu Hart MD     • Adult TPN (STANDARD BASE/STANDARD ELECTROLYTE)   Intravenous Continuous TPN TOLU Orellana 41 6 mL/hr at 08/31/22 2154   • Adult TPN (STANDARD BASE/STANDARD ELECTROLYTE)   Intravenous Continuous TPN Ross Arroyo DO     • clonazePAM  3 mg Oral HS Vasu Hart MD     • folic acid  1 mg Oral Daily Vasu Hart MD     • levothyroxine  112 mcg Oral Early Morning Vasu Hart MD     • midodrine  5 mg Oral TID AC Vasu Hart MD     • ondansetron  4 mg Intravenous Q6H PRN Vasu Hart MD     • pantoprazole  40 mg Intravenous Q12H [address] Isela Mccormick DO     • thiamine  100 mg Oral Daily Vasu Hart MD     • traZODone  150 mg Oral HS Vasu Hart MD     • vancomycin  15 mg/kg Intravenous Q24H Stevan Deleon PA-C 1,000 mg (09/01/22 0610)   • vancomycin  125 mg Oral Q6H Stiven Díaz MD          Today, Patient Was Seen By: Isela Mccormick DO    ** Please Note: Dictation voice to text software may have been used in the creation of this document   **

## 2022-09-01 NOTE — CONSULTS
Patient being actively followed by infectious disease as of yesterday  Will evaluate patient today and provide progress note with full recommendations

## 2022-09-01 NOTE — NUTRITION
09/01/22 0942   Recommendations/Interventions   Summary Multiple attempts made to visit with pt, initially occupied with other provider, then soundly asleep  Per RN, pt has been consuming fluids  Interventions/Recommendations Continue current diet order;Monitor I & O's;Initiate daily weight   Recommendations to Provider 1  If able to continue to use PICC line for TPN recommended goal macronutrients for TPN are as follows: 30% dextrose in 833 mL (250g), 15% AA in 500mL (75g), and 20% lipids in 225mL (45g) for a total of 1600 kcal/day  2  If unable to continue with TPN via PICC due to infection at site , consider if J feeds would be appropriate- consult RD for tube feeding recs via J access if indicated/ in line with GOC  3   Obtain weight for this admission     Tracy Soto RD

## 2022-09-01 NOTE — ASSESSMENT & PLAN NOTE
· Patient with history of gastric bypass surgery  Has had significant malnutrition issues and anastomotic ulcerations  Seen by Bariatric Service during recent hospitalization at [facility] who recommended prolonged course of TPN for nutritional optimization and possible revision surgery in the future  · Was planned to have appt with bariatric sx on 8/20 however had to be rescheduled  · Given recurrent bacteremia will need alternate form of feeding   Thus GI consulted for consideration of J Tube  · For now cont on TPN while PICC remains in place

## 2022-09-01 NOTE — ASSESSMENT & PLAN NOTE
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

## 2022-09-02 ENCOUNTER — ANESTHESIA EVENT (INPATIENT)
Dept: GASTROENTEROLOGY | Facility: HOSPITAL | Age: 77
End: 2022-09-02
Payer: COMMERCIAL

## 2022-09-02 ENCOUNTER — ANESTHESIA (INPATIENT)
Dept: GASTROENTEROLOGY | Facility: HOSPITAL | Age: 77
End: 2022-09-02
Payer: COMMERCIAL

## 2022-09-02 RX ORDER — LIDOCAINE HYDROCHLORIDE 20 MG/ML
INJECTION, SOLUTION EPIDURAL; INFILTRATION; INTRACAUDAL; PERINEURAL AS NEEDED
Status: DISCONTINUED | OUTPATIENT
Start: 2022-09-02 | End: 2022-09-02

## 2022-09-02 RX ORDER — SODIUM CHLORIDE 9 MG/ML
INJECTION, SOLUTION INTRAVENOUS CONTINUOUS PRN
Status: DISCONTINUED | OUTPATIENT
Start: 2022-09-02 | End: 2022-09-02

## 2022-09-02 RX ORDER — PROPOFOL 10 MG/ML
INJECTION, EMULSION INTRAVENOUS AS NEEDED
Status: DISCONTINUED | OUTPATIENT
Start: 2022-09-02 | End: 2022-09-02

## 2022-09-02 RX ADMIN — PROPOFOL 25 MG: 10 INJECTION, EMULSION INTRAVENOUS at 15:41

## 2022-09-02 RX ADMIN — PROPOFOL 25 MG: 10 INJECTION, EMULSION INTRAVENOUS at 15:44

## 2022-09-02 RX ADMIN — PROPOFOL 25 MG: 10 INJECTION, EMULSION INTRAVENOUS at 15:42

## 2022-09-02 RX ADMIN — PROPOFOL 25 MG: 10 INJECTION, EMULSION INTRAVENOUS at 15:46

## 2022-09-02 RX ADMIN — LIDOCAINE HYDROCHLORIDE 100 MG: 20 INJECTION, SOLUTION EPIDURAL; INFILTRATION; INTRACAUDAL; PERINEURAL at 15:41

## 2022-09-02 RX ADMIN — SODIUM CHLORIDE: 0.9 INJECTION, SOLUTION INTRAVENOUS at 15:25

## 2022-09-02 NOTE — PROGRESS NOTES
Progress Note - Infectious Disease   Maria Teresa Nina 76 y o  female MRN: [id number]  Unit/Bed#: [facility] 317-01 Encounter: [id number]      Impression/Plan:  1  Sepsis  Pt presenting with significant leukocytosis to 31 and fever to 101 4  She was recently admitted 1 month ago for PICC line infection with Staph epidermidis bacteremia, treated with IV cefazolin  She has long-standing PICC requirement due to #8  Source for sepsis likely #2, also consider #3 vs #4  Pt now afebrile and hemodynamically stable but continues to be tachypnic, lethargic this morning, BP improved this morning, WBC continues to trend down at 8 today  1/2 BCx now growing staph epidermidis, given recent h/o PICC line bacteremia with coag-neg staph will treat this as true bacteremia  · Antibiotics with IV vancomycin and PO vancomycin, as below  · Collect repeat BCx when able  · Continue to follow BCx   · Continue to monitor fever curve, vitals, and WBC      2  Coagulase-negative staph bacteremia  1/2 BCx with staph epidermidis, other ng x48hr  H/o same during last admission about 1 month ago 2/2 PICC line in place due to #8  PICC line remains in place as pt has no other venous access at this time  · Continue IV vancomycin    · F/u repeat BCx when able to obtain to ensure negative   · F/u results of echo  · Remove PICC line when able to obtain other venous access  · Reassess need for TPN use and consider holding TPN for now  · Continue to monitor fever curve, vitals, and WBC     3  Colitis  Presented with 2 days of diarrhea  During last admission 1 month ago pt was found to have positive C diff PCR but negative EIA, indicating colonization rather than infection  She was treated with prophylactic PO vancomycin at that time  CT A/P this admission revealing diffuse colonic thickening suggestive of colitis  C diff this admission again with positive PCR but negative EIA, indicating colonization rather than acute infection   Pt now has rectal tube in place with liquid, dark brown stool in bag, not overtly bloody  FOBT ordered  Diarrhea may be 2/2 acute GI blood loss, as in #5  · Continue PO vancomycin 125mg q6h given degree of worsening diarrhea, appearance of colitis on imaging, and clinical sepsis  · Consider workup for other causes of infectious and non-infectous colitis as C diff is not the clear cause  · F/u FOBT  · GI consulted, appreciate recommendations      4  Cystitis vs asymptomatic bacturia  CT A/P also showing bladder wall thickening suggestive of cystitis  UA with innumerable RBCs, WBCs, and bacteria  Pt does endorse dysuria PTA  UCx with 80k cfu enteric-like GNRs, 40k proteus, and 40k enterococcus  Pt no longer having dysuria  S/p cefepime x1 and ceftriaxone x1  · Will not treat as UTI as symptoms have resolved and cfu <100k, likely indicating asymptomatic bacturia   · Continue to monitor fever curve, vitals, and WBC      5  Anemia  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  Pt presented to [facility] earlier this year with upper GI bleeding, found to have marginal ulcer bleeding on endoscopy  Pt with drop in Hb on 9/1 from 8 2 to 6 1, transfused 1U PRBCs  Stool does not appear overtly bloody or melanotic  FOBT ordered  GI consulted  6  NOAH  Cr on presentation 1 35, likely pre-renal etiology in the setting of diarrhea and dehydration  Improved after IVF resuscitation  Avoid nephrotoxic agents and hypotension  Continue to monitor  Will dose adjust antibiotics as indicated       7  Elevated LFT's  No h/o liver disease  Labs from earlier this year show occasional elevation in ALP but AST and ALT mostly wnl  CMP on arrival: , , , albumin 1 7, INR 1 21  CT A/P also showing cholelithiasis with no inflammatory changes  Pt does have h/o RUQ pain and gallbladder inflammation, per chart review  Prior LFT abnormalities have been more cholestatic pattern compared to this more hepatocellular/ mixed pattern   Hepatitis panel negative  Other etiology could include shock liver in the setting of sepsis  LFTs downtrending after IVF resuscitation  Continue to monitor  GI consulted      8  TPN use  Started on TPN to maintain nutrition in the setting of bleeding marginal ulcers, as in #5, until bariatric surgery follow up, which was supposed to occur last week but it appears that pt cancelled appointment  PICC in place on arrival with no signs of infection  Antibiotics:   IV vancomycin #3  PO vancomycin #4    Subjective:  Patient sleepy throughout exam today  Minimal interview as pt states that she wants to nap and goes back to sleep  Appears comfortable  She denies fever/ chills, nausea/ vomiting  Continues to have diarrhea  Objective:  Vitals:  Temp:  [97 4 °F (36 3 °C)-98 7 °F (37 1 °C)] 98 7 °F (37 1 °C)  HR:  [73-80] 80  Resp:  [12-23] 20  BP: ()/(53-64) 119/55  SpO2:  [93 %-98 %] 93 %  Temp (24hrs), Av °F (36 7 °C), Min:97 4 °F (36 3 °C), Max:98 7 °F (37 1 °C)  Current: Temperature: 98 7 °F (37 1 °C)    Physical Exam:   General Appearance:  Nontoxic, no acute distress  Somnolent but easily arousable  Throat: Oropharynx moist without lesions  Lungs: Tachypnic, coarse crackles diffusely  Heart:  RRR; faint systolic murmur    Abdomen:   Soft, non-tender, non-distended, positive bowel sounds  Extremities: No clubbing, cyanosis or edema   Skin: No new rashes or lesions  No draining wounds noted         Labs, Imaging, & Other studies:   All pertinent labs and imaging studies were personally reviewed  Results from last 7 days   Lab Units 22  0727 22  1204 22  1203 22  0618 22  0206   WBC Thousand/uL 8 94  --   --  14 * 31 68*   HEMOGLOBIN g/dL 7 5*  --  6 1* 6 4* 8 2*   PLATELETS Thousands/uL 461* 448*  --  481* 534*     Results from last 7 days   Lab Units 22  0618 22  0212   SODIUM mmol/L 139 138   POTASSIUM mmol/L 3 6 4 0   CHLORIDE mmol/L 112* 109*   CO2 mmol/L 21 21   BUN mg/dL 36* 43*   CREATININE mg/dL 1 19 1 35*   EGFR ml/min/1 73sq m 43 37   CALCIUM mg/dL 8 2* 8 6   AST U/L 53* 115*   ALT U/L 115* 183*   ALK PHOS U/L 149* 193*     Results from last 7 days   Lab Units 08/31/22  0635 08/31/22  0501 08/31/22  0206   BLOOD CULTURE   --   --  No Growth at 48 hrs     GRAM STAIN RESULT   --   --  Gram positive cocci in clusters*   URINE CULTURE   --  80,000-89,000 cfu/ml - 2 strains Gram Negative Jerome Enteric Like*  40,000-49,000 cfu/ml Proteus species*  40,000-49,000 cfu/ml Enterococcus species*  --    C DIFF TOXIN B BY PCR  Positive*  --   --      Results from last 7 days   Lab Units 08/31/22  0206   PROCALCITONIN ng/ml 0 54*

## 2022-09-02 NOTE — PLAN OF CARE
Problem: Potential for Falls  Goal: Patient will remain free of falls  Description: INTERVENTIONS:  - Educate patient/family on patient safety including physical limitations  - Instruct patient to call for assistance with activity   - Consult OT/PT to assist with strengthening/mobility   - Keep Call bell within reach  - Keep bed low and locked with side rails adjusted as appropriate  - Keep care items and personal belongings within reach  - Initiate and maintain comfort rounds  - Make Fall Risk Sign visible to staff  - Offer Toileting every Hours, in advance of need  - Initiate/Maintain alarm  - Obtain necessary fall risk management equipment:   - Apply yellow socks and bracelet for high fall risk patients  - Consider moving patient to room near nurses station  Outcome: Progressing     Problem: INFECTION - ADULT  Goal: Absence or prevention of progression during hospitalization  Description: INTERVENTIONS:  - Assess and monitor for signs and symptoms of infection  - Monitor lab/diagnostic results  - Monitor all insertion sites, i e  indwelling lines, tubes, and drains  - Monitor endotracheal if appropriate and nasal secretions for changes in amount and color  - [place] appropriate cooling/warming therapies per order  - Administer medications as ordered  - Instruct and encourage patient and family to use good hand hygiene technique  - Identify and instruct in appropriate isolation precautions for identified infection/condition  Outcome: Progressing     Problem: SAFETY ADULT  Goal: Patient will remain free of falls  Description: INTERVENTIONS:  - Educate patient/family on patient safety including physical limitations  - Instruct patient to call for assistance with activity   - Consult OT/PT to assist with strengthening/mobility   - Keep Call bell within reach  - Keep bed low and locked with side rails adjusted as appropriate  - Keep care items and personal belongings within reach  - Initiate and maintain comfort rounds  - Make Fall Risk Sign visible to staff  - Offer Toileting every  Hours, in advance of need  - Initiate/Maintain alarm  - Obtain necessary fall risk management equipment:   - Apply yellow socks and bracelet for high fall risk patients  - Consider moving patient to room near nurses station  Outcome: Progressing     Problem: SKIN/TISSUE INTEGRITY - ADULT  Goal: Skin Integrity remains intact(Skin Breakdown Prevention)  Description: Assess:  -Perform Gabe assessment every   -Clean and moisturize skin every   -Inspect skin when repositioning, toileting, and assisting with ADLS  -Assess under medical devices such as every   -Assess extremities for adequate circulation and sensation     Bed Management:  -Have minimal linens on bed & keep smooth, unwrinkled  -Change linens as needed when moist or perspiring  -Avoid sitting or lying in one position for more than hours while in bed  -Keep HOB at degrees     Toileting:  -Offer bedside commode  -Assess for incontinence every   -Use incontinent care products after each incontinent episode such as     Activity:  -Mobilize patient times a day  -Encourage activity and walks on unit  -Encourage or provide ROM exercises   -Turn and reposition patient every Hours  -Use appropriate equipment to lift or move patient in bed  -Instruct/ Assist with weight shifting every  when out of bed in chair  -Consider limitation of chair time hour intervals    Skin Care:  -Avoid use of baby powder, tape, friction and shearing, hot water or constrictive clothing  -Relieve pressure over bony prominences using   -Do not massage red bony areas    Next Steps:  -Teach patient strategies to minimize risks such as   -Consider consults to  interdisciplinary teams such as   Outcome: Progressing

## 2022-09-02 NOTE — ASSESSMENT & PLAN NOTE
· POA; Met SIRs criteria with fever, leukocytosis   · Source with bacteremia, possible C diff colitis  · Infectious disease following  · + evidence of colitis on ct scan  Status post C diff panel; positive toxin PCR but negative EIA  On p o  Vancomycin  · Positive recurrent bacteremia  Presumed secondary to PICC line  Prior history of recent MSSE  · F/u repeat blood cx  · On IV vancomycin  · Ur cx polymicrobial but non significant colony ct   CTX discontinued   · Leukocytosis resolved

## 2022-09-02 NOTE — PROGRESS NOTES
[address] [facility]  Progress Note - Adelfo Longest 1945, 76 y o  female MRN: [id number]  Unit/Bed#: [facility] 317-01 Encounter: [id number]  Primary Care Provider: Leopold Corwin, MD   Date and time admitted to hospital: 8/30/2022 11:49 PM    * Sepsis [facility])  Assessment & Plan  · POA; Met SIRs criteria with fever, leukocytosis   · Source with bacteremia, possible C diff colitis  · Infectious disease following  · + evidence of colitis on ct scan  Status post C diff panel; positive toxin PCR but negative EIA  On p o  Vancomycin  · Positive recurrent bacteremia  Presumed secondary to PICC line  Prior history of recent MSSE  · F/u repeat blood cx  · On IV vancomycin  · Ur cx polymicrobial but non significant colony ct  CTX discontinued   · Leukocytosis resolved      Bacteremia  Assessment & Plan  · Recent hx of staph epi bacteremia in 7/2022, presumed due to picc line  · Now with possible recurrence 1/2 set staph epi, 2nd set no growth thus far  · ID on board  · On IV vancomycin  · May need to have PICC removed  But will leave in place for now given poor IV access, ok per ID  · Cardiology consulted, will need TAVIA  S/p TTE in 7/2022  · F/u repeat blood cx  · GI consulted for alternate feeding, recommends may have to d/c TPN  D/w pt by myself and GI does not wish to have feeding tube including NGT  And J tube not a consideration as planned for redo of [place] en y  Pt wishes for me to discuss further with her brother  Palliative consulted to aide in addressing goals of care    H/O bariatric surgery - bypass  Assessment & Plan  · Patient with history of gastric bypass surgery  Has had significant malnutrition issues and anastomotic ulcerations  Seen by Bariatric Service during recent hospitalization at hospitals who recommended prolonged course of TPN for nutritional optimization and possible revision of surgery in the future    · Was planned to have appt with bariatric sx on 8/20 however had to be rescheduled  · Given recurrent bacteremia will need alternate form of feeding  S/p GI consult, appreciate input  Also recommending consideration to d/c TPN  However pt refusing for placement of PEG or NGT  J tube not a consideration given plans for reversal of savage-en-y in the future  · Pt wishes to discuss further with her brother  But it appears may be leaning toward just having PO feeds  · Palliative on board to aide in addressing goals of care     Acute on chronic anemia  Assessment & Plan  · GI on board  · 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  · S/p recent EGD in 7/22 revealing: Residual marginal ulcer of the gastrojejunostomy anastomosis with ijmproved size  · S/p EGD 9/2 revealing: Large, cratered ulcer in the gastrojejunal anastomosis   · Received PRBC transfusion x 1   · Resumed back on diet  · Cont PPI BID  · H/H stable    Moderate protein-calorie malnutrition ([address] )  Assessment & Plan  Malnutrition Findings:   Adult Malnutrition type: Chronic illness  Adult Degree of Malnutrition: Malnutrition of moderate degree  Malnutrition Characteristics: Fat loss, Muscle loss                  360 Statement: Moderate malnutrition in setting of chronic illness related to ongoing GI issues/malabsorption as evidenced by severely sunken orbital lobes, sunken temporal lobes, severely protruding clavicular bone    BMI Findings: Body mass index is 21 46 kg/m²  Hx of Savage-en Y gastric bypass, 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  Pt refusing NGT or PEG  Will have to consider d/c of TPN given recurrent bacteremia  Pt wishes to discuss further with her brother    Goals of care, counseling/discussion  Assessment & Plan  · Palliative consulted  · Discussed with her at length today   She does not wish to have feeding tube  · She remains full code for now      Acute kidney insufficiency  Assessment & Plan  Possibly secondary to increased losses from the GI tract due to diarrhea vs GI Bleed  · Cr trended down  · Avoid nephrotoxins and renally dose meds    Ambulatory dysfunction  Assessment & Plan  · Exacerbated by diarrhea/dehydration however patient is chronically malnourished  · S/p PT/OT evaluation; to return to SNF    Elevated LFTs  Assessment & Plan  With incidental finding of cholelithiasis however generally the gallbladder is unremarkable on CT A/P    · Patient is also on TPN at baseline   · Trending down    Depression  Assessment & Plan  · On trazodone  Hypothyroidism  Assessment & Plan  · Continue levothyroxine     VTE Pharmacologic Prophylaxis:   Pharmacologic: Pharmacologic VTE Prophylaxis contraindicated due to anemia  Mechanical VTE Prophylaxis in Place: Yes    Patient Centered Rounds: I have performed bedside rounds with nursing staff today  Discussions with Specialists or Other Care Team Provider: ID    Education and Discussions with Family / Patient: Patient  Called her brother Ofelia Contreras, no answer left msg    Time Spent for Care: 30 minutes  More than 50% of total time spent on counseling and coordination of care as described above  Current Length of Stay: 2 day(s)    Current Patient Status: Inpatient   Certification Statement: The patient will continue to require additional inpatient hospital stay due to Acute illness    Discharge Plan:     Code Status: Level 1 - Full Code      Subjective:   Pt seen and examined at bedside  Awaiting EGD  No sign of GI bleed      Objective:     Vitals:   Temp (24hrs), Av 8 °F (36 6 °C), Min:97 2 °F (36 2 °C), Max:98 7 °F (37 1 °C)    Temp:  [97 2 °F (36 2 °C)-98 7 °F (37 1 °C)] 97 3 °F (36 3 °C)  HR:  [65-89] 68  Resp:  [14-18] 18  BP: (109-128)/(55-65) 115/60  SpO2:  [91 %-97 %] 97 %  Body mass index is 21 46 kg/m²  Input and Output Summary (last 24 hours):        Intake/Output Summary (Last 24 hours) at 2022 5678  Last data filed at 2022 1749  Gross per 24 hour   Intake 100 ml   Output 1600 ml   Net -1500 ml       Physical Exam:     Physical Exam  Constitutional:       Comments: Cachectic   Cardiovascular:      Rate and Rhythm: Normal rate and regular rhythm  Pulses: Normal pulses  Heart sounds: Normal heart sounds  Pulmonary:      Effort: Pulmonary effort is normal  No respiratory distress  Breath sounds: Normal breath sounds  No wheezing or rales  Abdominal:      General: Abdomen is flat  Bowel sounds are normal  There is no distension  Palpations: Abdomen is soft  Tenderness: There is no abdominal tenderness  There is no guarding  Musculoskeletal:         General: Normal range of motion  Right lower leg: No edema  Left lower leg: No edema  Skin:     General: Skin is warm and dry  Neurological:      General: No focal deficit present  Mental Status: She is alert and oriented to person, place, and time  Mental status is at baseline  Cranial Nerves: No cranial nerve deficit  Motor: No weakness  Additional Data:     Labs:    Results from last 7 days   Lab Units 09/02/22  0727 09/01/22  1203 09/01/22  0618   WBC Thousand/uL 8 94  --  14 01*   HEMOGLOBIN g/dL 7 5*   < > 6 4*   HEMATOCRIT % 23 9*   < > 20 9*   PLATELETS Thousands/uL 461*   < > 481*   NEUTROS PCT %  --   --  67   LYMPHS PCT %  --   --  13*   MONOS PCT %  --   --  7   EOS PCT %  --   --  11*    < > = values in this interval not displayed       Results from last 7 days   Lab Units 09/02/22  1042 09/01/22  0618   SODIUM mmol/L 138 139   POTASSIUM mmol/L 3 7 3 6   CHLORIDE mmol/L 111* 112*   CO2 mmol/L 21 21   BUN mg/dL 26* 36*   CREATININE mg/dL 1 12 1 19   ANION GAP mmol/L 6 6   CALCIUM mg/dL 8 6 8 2*   ALBUMIN g/dL  --  1 4*   TOTAL BILIRUBIN mg/dL  --  0 18*   ALK PHOS U/L  --  149*   ALT U/L  --  115*   AST U/L  --  53*   GLUCOSE RANDOM mg/dL 103 106     Results from last 7 days   Lab Units 08/31/22  0212   INR  1 21*             Results from last 7 days   Lab Units 09/02/22  1042 08/31/22  0415 08/31/22  0212 08/31/22  0206   LACTIC ACID mmol/L  --  0 9 1 0  --    PROCALCITONIN ng/ml 0 40*  --   --  0 54*           * I Have Reviewed All Lab Data Listed Above  * Additional Pertinent Lab Tests Reviewed: All Labs Within Last 24 Hours Reviewed    Imaging:    Imaging Reports Reviewed Today Include: EGD  Imaging Personally Reviewed by Myself Includes:      Recent Cultures (last 7 days):     Results from last 7 days   Lab Units 09/02/22  1044 08/31/22  0635 08/31/22  0501 08/31/22  0206   BLOOD CULTURE  Received in Microbiology Lab  Culture in Progress  Received in Microbiology Lab  Culture in Progress  --   --  Staphylococcus coagulase negative*  No Growth at 48 hrs     GRAM STAIN RESULT   --   --   --  Gram positive cocci in clusters*   URINE CULTURE   --   --  80,000-89,000 cfu/ml - 2 strains Gram Negative Jerome Enteric Like*  40,000-49,000 cfu/ml Proteus species*  40,000-49,000 cfu/ml Enterococcus species*  --    C DIFF TOXIN B BY PCR   --  Positive*  --   --        Last 24 Hours Medication List:   Current Facility-Administered Medications   Medication Dose Route Frequency Provider Last Rate   • acetaminophen  650 mg Oral Q6H PRN Izzy Burns MD     • Adult TPN (STANDARD BASE/STANDARD ELECTROLYTE)   Intravenous Continuous TPN Natanael Cho DO 41 6 mL/hr at 09/01/22 2318   • Adult TPN (STANDARD BASE/STANDARD ELECTROLYTE)   Intravenous Continuous TPN Jcarlos Arroyo DO     • clonazePAM  3 mg Oral HS Izzy Burns MD     • folic acid  1 mg Oral Daily Izzy Burns MD     • levothyroxine  112 mcg Oral Early Morning Car Nunse MD     • midodrine  5 mg Oral TID AC Car Nunes MD     • ondansetron  4 mg Intravenous Q6H PRN Izzy Burns MD     • pantoprazole  40 mg Intravenous Q12H [facility] Kaylan Middleton DO     • thiamine  100 mg Oral Daily Izzy Burns MD     • traZODone  150 mg Oral HS Izzy Burns MD     • vancomycin  15 mg/kg Intravenous Q24H [facility] Stopped (09/02/22 0755)   • vancomycin  125 mg Oral Q6H [address] Abby Braswell MD          Today, Patient Was Seen By: Timmy Garvin DO    ** Please Note: Dictation voice to text software may have been used in the creation of this document   **

## 2022-09-02 NOTE — QUICK NOTE
GI Quick Note  --------------------    Patient did not want NG tube placed and stated she does not want any PEJ  We had a discussion regarding the need for enteral nutrition and she understands that she has risks of bacteremia with her TPN  We also did not want to do a jejunostomy tube as plan for redo of her Savage-en-Y given nonhealing ulcer

## 2022-09-02 NOTE — CASE MANAGEMENT
Case Management Discharge Planning Note    Patient name Lenwood Fabry Location [facility] 076-77 MRN [id number]  : 1945 Date 2022       Current Admission Date: 2022  Current Admission Diagnosis:Sepsis [facility])   Patient Active Problem List    Diagnosis Date Noted   • Moderate protein-calorie malnutrition ([address] ) 2022   • Bacteremia 2022   • Acute on chronic anemia 2022   • Goals of care, counseling/discussion 2022   • Colitis presumed to be due to infection 2022   • Acute cystitis without hematuria 2022   • Acute kidney insufficiency 2022   • Fall 2022   • Diarrhea 2022   • Sepsis [address] [address] ) 2022   • Sacral ulcer ([facility] [address] ) 2022   • Gram-positive bacteremia 2022   • Severe protein-calorie malnutrition ([facility] [address] ) 2022   • Bilateral leg edema 2022   • Ambulatory dysfunction 2022   • Acute blood loss anemia 2022   • Gastric ulcer 2022   • Fever 2022   • Anemia 2022   • Dyspnea on exertion 2022   • Generalized weakness 2022   • Elevated LFTs 2022   • Hyponatremia 2022   • Abnormal CT scan 2022   • H/O bariatric surgery - bypass 2022   • Cerumen debris on tympanic membrane of right ear 2022   • Nasal congestion 2022   • Bilateral hearing loss 2022   • Fibromyalgia syndrome 2021   • Osteopenia of both forearms 2021   • Medicare annual wellness visit, subsequent 2021   • Shortness of breath 2021   • Acute bronchitis 2021   • COVID-19 2021   • Dysphasia 2020   • Epigastric pain 2020   • Hypothyroidism 2020   • Gastroesophageal reflux disease without esophagitis 2020   • Depression 2020   • Fibromyalgia, primary 2020   • Iron deficiency 2020   • Numbness of foot 2020      LOS (days): 2  Geometric Mean LOS (GMLOS) (days): 4 80  Days to GMLOS:2 4     OBJECTIVE:  Risk of Unplanned Readmission Score: 27 56         Current admission status: Inpatient   Preferred Pharmacy:   [facility] [address] [facility], [address], [place] [address] [facility] [address]  Phone: [phone] Fax: [phone]    [address] [facility] [address] [place] [address]  Phone: [phone] Fax: [phone]    Primary Care Provider: Issac Lyn MD    Primary Insurance: [facility]  Secondary Insurance:     DISCHARGE DETAILS:    Discharge planning discussed with[de-identified] Melissa Rajput (brother) NO VM left he did not answer--[facility] referrals sent for possible LTC placement  Freedom of Choice: Yes  Comments - Freedom of Choice: Attempted to call Melissa Rajput but he did not answer no VM left                Contacts  Patient Contacts: Melissa Rajput  Relationship to Patient[de-identified] Family  Contact Method: Phone  Phone Number: [phone]  Reason/Outcome: Continuity of Care, Emergency Contact, Discharge Planning

## 2022-09-02 NOTE — ASSESSMENT & PLAN NOTE
· Recent hx of staph epi bacteremia in 7/2022, presumed due to picc line  · Now with possible recurrence 1/2 set staph epi, 2nd set no growth thus far  · ID on board  · On IV vancomycin  · May need to have PICC removed  But will leave in place for now given poor IV access, ok per ID  · Cardiology consulted, will need TAVIA  S/p TTE in 7/2022  · F/u repeat blood cx  · GI consulted for alternate feeding, recommends may have to d/c TPN  D/w pt by myself and GI does not wish to have feeding tube including NGT  And J tube not a consideration as planned for redo of [place] en y  Pt wishes for me to discuss further with her brother   Palliative consulted to aide in addressing goals of care

## 2022-09-02 NOTE — PROGRESS NOTES
Vancomycin Assessment    Lenwood Fabry is a 76 y o  female who is currently receiving vancomycin 1000 mg IV q24h for bacteremia     Relevant clinical data and objective history reviewed:  Creatinine   Date Value Ref Range Status   08/31/2022 1 35 (H) 0 60 - 1 30 mg/dL Final     Comment:     Standardized to IDMS reference method   08/08/2022 1 12 0 60 - 1 30 mg/dL Final     Comment:     Standardized to IDMS reference method   08/07/2022 1 10 0 60 - 1 30 mg/dL Final     Comment:     Standardized to IDMS reference method     Vancomycin Rm   Date Value Ref Range Status   07/27/2022 16 0 10 0 - 20 0 ug/mL Final     /53 (BP Location: Left arm)   Pulse 86   Temp 97 9 °F (36 6 °C) (Oral)   Resp 18   SpO2 94%   I/O last 3 completed shifts:  In: -   Out: 220 [Urine:220]  Lab Results   Component Value Date/Time    BUN 43 (H) 08/31/2022 02:12 AM    BUN 17 11/14/2020 10:02 AM    WBC 31 68 (HH) 08/31/2022 02:06 AM    HGB 8 2 (L) 08/31/2022 02:06 AM    HCT 27 3 (L) 08/31/2022 02:06 AM     (H) 08/31/2022 02:06 AM     (H) 08/31/2022 02:06 AM     Temp Readings from Last 3 Encounters:   08/31/22 97 9 °F (36 6 °C) (Oral)   08/09/22 99 3 °F (37 4 °C) (Oral)   07/20/22 98 4 °F (36 9 °C) (Temporal)     Vancomycin Days of Therapy: 2    Assessment/Plan  The patient is currently on vancomycin utilizing scheduled dosing based on actual body weight  Baseline risks associated with therapy include: pre-existing renal impairment and advanced age  The patient is currently receiving 1000 mg IV q24h and is clinically appropriate and dose will be continued  Pharmacy will also follow closely for s/sx of nephrotoxicity, infusion reactions, and appropriateness of therapy  BMP and CBC will be ordered per protocol  Plan for trough as patient approaches steady state, prior to the 4th  dose at approximately 03:30 on 9/4/22  Due to infection severity, will target a trough of 15-20 (appropriate for most indications)   Pharmacy will continue to follow the patient’s culture results and clinical progress daily      Marcella Arzola, Pharmacist

## 2022-09-02 NOTE — CONSULTS
Consultation - Palliative and Supportive Care   Jada Orellana 76 y o  female [id number]    Patient Active Problem List   Diagnosis   • Hypothyroidism   • Gastroesophageal reflux disease without esophagitis   • Depression   • Fibromyalgia, primary   • Iron deficiency   • Numbness of foot   • Dysphasia   • Epigastric pain   • COVID-19   • Acute bronchitis   • Shortness of breath   • Medicare annual wellness visit, subsequent   • Fibromyalgia syndrome   • Osteopenia of both forearms   • Cerumen debris on tympanic membrane of right ear   • Nasal congestion   • Bilateral hearing loss   • Anemia   • Dyspnea on exertion   • Generalized weakness   • Elevated LFTs   • Hyponatremia   • Abnormal CT scan   • H/O bariatric surgery - bypass   • Fever   • Gastric ulcer   • Acute blood loss anemia   • Ambulatory dysfunction   • Severe protein-calorie malnutrition (HCC)   • Bilateral leg edema   • Gram-positive bacteremia   • Sepsis ([facility] [address] )   • [facility]   • Diarrhea   • Fall   • Colitis presumed to be due to infection   • Acute cystitis without hematuria   • Acute kidney insufficiency   • Moderate protein-calorie malnutrition (HCC)   • Bacteremia   • Acute on chronic anemia   • Goals of care, counseling/discussion     Active issues specifically addressed today include:   • Palliative care encounter  • Goals of care discussion  • protein calorie malnutrition  • TPN dependence  • Bacteremia    Plan:  1  Symptom management -   • Per primary team  • Delirium precautions: please minimize interruptions and prioritize sleep at night  No TV nor screen time at night  Shades drawn at night  During day, shades up, minimize napping, and encourage meals in chair  2  Goals -   • Patient is not able to participate in goals of care discussions today  • Spoke with sister, Leroy Robledo, who will work to coordinate a family meeting tentatively Tuesday morning   Several siblings currently traveling from out of state (4) Leroy Lunatrudy herself will be participating via conference call as she has just returned from a 2 week visit with patient  Doni Wright will call office with time for meeting     Code Status: full - Level 1   Decisional apparatus:  Patient is not competent on my exam today  If competence is lost, patient's substitute decision maker would default to adult siblings (11) by [place]  Advance Directive / Living Will / POLST:  Financial document written (reportedly) but no medical POA or living will  adv    3  Social support  • Patient is supported by 5 siblings and LUCIA Shara Burn)    4  Follow-up  • Palliative care will update team when family meeting time arranged on 9/6  Call sooner with questions or concerns  I have reviewed the patient's controlled substance dispensing history in the Prescription Drug Monitoring Program in compliance with the [facility] regulations before prescribing any controlled substances  We appreciate the invitation to be involved in this patient's care  We will continue to follow  Please do not hesitate to reach our on call provider through our clinic answering service at  should you have acute symptom control concerns  Dianna Toribio PA-C  Palliative and Supportive Care  Clinic/Answering Service: [phone]  You can find me on [employer]! 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, presented to [facility] on 08/[facility] for 2 day history of increasing weakness and diarrhea    Patient had recent admission for septic shock and cholecystitis and has been receiving IV antibiotics  Patient is completed course of vancomycin  CT on admission positive for diffuse colitis and cystitis  She was started on cefepime and vancomycin  Rectal tube has been placed for frequent bowel movements in combination with sacral ulceration  Patient's course has been complicated by NOAH, anemia requiring transfusion  Patient underwent EGD to evaluate for GI bleed on , no active source of bleeding found  Prior to admission Milagros Johnson was residing at [facility] for short-term rehab but with possibility of transition to long-term care due to worsening debility  During evaluation today patient is very drowsy, confused, placed in posey belt   However, denies complaints    Review of Systems   Unable to perform ROS: mental status change       Past Medical History:   Diagnosis Date   • Anemia    • Anxiety    • Bipolar disorder ([facility] [address] )    • Colon polyp    • Disease of thyroid gland    • Essential hypertension    • Essential tremor    • Fibromyalgia, primary    • GERD (gastroesophageal reflux disease)    • Inflammatory polyarthropathy (HCC)    • Mammogram abnormal    • Pressure injury of skin      Past Surgical History:   Procedure Laterality Date   • BREAST BIOPSY Right     benign   • CARPAL TUNNEL RELEASE Bilateral    • COLONOSCOPY     • EGD AND COLONOSCOPY  2014   • GASTRIC BYPASS  2012   • MAMMO NEEDLE LOCALIZATION RIGHT (ALL INC) Right 2012   • MAMMO NEEDLE LOCALIZATION RIGHT (ALL INC) Right 2012   • ULNAR NERVE TRANSPOSITION Right      Social History     Socioeconomic History   • Marital status: Single     Spouse name: Not on file   • Number of children: Not on file   • Years of education: Not on file   • Highest education level: Not on file   Occupational History   • Not on file   Tobacco Use   • Smoking status: Former Smoker     Quit date:      Years since quittin 6   • Smokeless tobacco: Never Used   Vaping Use   • Vaping Use: Never used   Substance and Sexual Activity   • Alcohol use: Yes     Alcohol/week: 4 0 standard drinks     Types: 4 Glasses of wine per week     Comment: rare   • Drug use: Never   • Sexual activity: Not Currently   Other Topics Concern   • Not on file   Social History Narrative   • Not on file     Social Determinants of Health     Financial Resource Strain: Not on file   Food Insecurity: No Food Insecurity   • Worried About Running Out of Food in the Last Year: Never true   • Ran Out of Food in the Last Year: Never true   Transportation Needs: No Transportation Needs   • Lack of Transportation (Medical): No   • Lack of Transportation (Non-Medical): No   Physical Activity: Not on file   Stress: Not on file   Social Connections: Not on file   Intimate Partner Violence: Not on file   Housing Stability: Low Risk    • Unable to Pay for Housing in the Last Year: No   • Number of Places Lived in the Last Year: 2   • Unstable Housing in the Last Year: No     Family History   Problem Relation Age of Onset   • No Known Problems Mother    • No Known Problems Father    • No Known Problems Sister    • No Known Problems Maternal Grandmother    • No Known Problems Maternal Grandfather    • No Known Problems Paternal Grandmother    • No Known Problems Paternal Grandfather    • No Known Problems Sister    • No Known Problems Sister    • Stomach cancer Maternal Aunt    • No Known Problems Maternal Aunt    • No Known Problems Maternal Aunt    • No Known Problems Maternal Aunt    • No Known Problems Paternal Aunt    • Leukemia Other        MEDICATIONS / ALLERGIES:    all current active meds have been reviewed    No Known Allergies    OBJECTIVE:    Physical Exam  Physical Exam  Constitutional:       General: She is not in acute distress  Appearance: She is ill-appearing  Comments: Frail, malnourished   Eyes:      Conjunctiva/sclera: Conjunctivae normal    Cardiovascular:      Rate and Rhythm: Normal rate     Pulmonary: Effort: No respiratory distress  Abdominal:      Comments: Rectal tube in place, diarrhea in bag   Musculoskeletal:         General: No swelling  Skin:     Coloration: Skin is pale  Neurological:      Comments: Oriented to self only   Psychiatric:      Comments: No agitation at current, but earlier was impulsive in attempts to get OOB         Lab Results:  Results from last 7 days   Lab Units 09/02/22  0727 09/01/22  1204 09/01/22  1203 09/01/22  0618 08/31/22  0206   WBC Thousand/uL 8 94  --   --  14 01* 31 68*   HEMOGLOBIN g/dL 7 5*  --  6 1* 6 4* 8 2*   HEMATOCRIT % 23 9*  --  19 1* 20 9* 27 3*   PLATELETS Thousands/uL 461* 448*  --  481* 534*   NEUTROS PCT %  --   --   --  67 79*   MONOS PCT %  --   --   --  7 4     Results from last 7 days   Lab Units 09/02/22  1042 09/01/22  0618 08/31/22  0212   POTASSIUM mmol/L 3 7 3 6 4 0   CHLORIDE mmol/L 111* 112* 109*   CO2 mmol/L 21 21 21   BUN mg/dL 26* 36* 43*   CREATININE mg/dL 1 12 1 19 1 35*   CALCIUM mg/dL 8 6 8 2* 8 6   ALK PHOS U/L  --  149* 193*   ALT U/L  --  115* 183*   AST U/L  --  53* 115*         Imaging Studies: Reviewed pertinent studies  EKG, Pathology, and Other Studies: reviewed pertinent studies    Counseling / Coordination of Care    Total floor / unit time spent today 45+ minutes  Greater than 50% of total time was spent with the patient and / or family counseling and / or coordination of care  A description of the counseling / coordination of care: time spent assessing patient, communicating with primary team, RN, family to coordinate meeting        This note was not shared with the patient due to privacy exception: note includes other individuals

## 2022-09-02 NOTE — ASSESSMENT & PLAN NOTE
· GI on board  · 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  · S/p recent EGD in 7/22 revealing: Residual marginal ulcer of the gastrojejunostomy anastomosis with ijmproved size  · S/p EGD 9/2 revealing: Large, cratered ulcer in the gastrojejunal anastomosis   · Received PRBC transfusion x 1   · Resumed back on diet  · Cont PPI BID  · H/H stable

## 2022-09-02 NOTE — CONSULTS
Consultation - 126 [facility]  Klaudia Serrato 76 y o  female MRN: [id number]  Unit/Bed#: [facility] 317-01 Encounter: [id number]        Inpatient consult to gastroenterology  Consult performed by: Annita Vaca DO  Consult ordered by: Marquita Spatz, DO          Reason for Consult / Principal Problem:  Anemia, protein calorie malnutrition    ASSESSMENT AND PLAN:       76year old female with a PMHx of Savage-en-Y gastric bypass C/B malnutrition requiring TPN and  marginal ulceration requiring endoscopic intervention who now presents with sepsis 2/2 to potential recurrent PICC line infection and possible C  Diff colitis  Hospitalization further complicated by acute blood loss anemia requiring blood transfusion  # Acute on Chronic Anemia  # History of Savage-en-Y Gastric Bypass C/B Anastomotic Ulcer  # Protein Calorie Malnutrition with Chronic TPN use C/B PICC line infection  # Colitis on CT with diarrhea; Possible C diff Infection  # Transaminitis    1  Acute on Chronic Anemia: Patient with chronic anemia 2/2 to ulcer at gastrojejunal anastomotic site  Patient with multiple admissions for anemia due to bleeding from this site requiring endoscopic intervention (6/12 S/P 2 Clips and Epi due to Tuan IIB; 6/15 S/P Epi and Bipolar Cautery due to Tuan IIA)  Most recent EGD performed on 7/20/22 with improvement in size of the marginal ulcer  However, patient with hemoglobin drop again this admission down to 6 1 (Unclear baseline; admit Hgb 8 2) requiring blood transfusion  Patient also with severe iron deficiency anemia  Patient with no abdominal pain but does complain of fatigue  Vital signs stable  Abdominal exam benign and rectal tube in place with dark green/brown stools and no overt blood  Given history and need for transfusion, will consider repeat but nonemergent endoscopic intervention     · Hemoglobin 7 5; monitor and transfuse for goal >7 0   · Advise Venofer 300 mg IV x3 doses  · Continue Protonix 40 mg IV BID; begin Carafate 1g QID  · Rectal tube in place; monitor output and for signs of overt bleeding   · Monitor vital signs and avoid episodes of hypotension   · Strict avoidance of NSAID usage   · Patient to remain NPO; plan for nonemergent endoscopic evaluation     2  History Savage-en-Y Gastric Bypass: Completed in 2012 at [facility]  Bypass completed by acute blood loss anemia and marginal ulceration requiring blood transfusion and GI intervention x2  Patient follows with Bariatric Surgeon, Dr Geoff Deluna  Currently on TPN to allow for nutritional optimization prior to endoscopic potential revision  · Continue on Protonix 40 mg BID IV and begin Carafate 1g QID  · Continued follow up with Bariatric Surgery   · Currently on TPN; nutrition consultation     3  Protein Calorie Malnutrition with TPN Use C/B Bacteremia: Patient second hospitalization for sepsis secondary to bacteremia from TPN  Blood cultures 1/2 positive for Straph epidermidis  Infectious disease currently consulted for treatment of recurrent infection  PICC line remains in place at this time and patient still receiving TPN  Given recurrent infection, would reconsider TPN usage and possible discontinuation  · Antibiotics per infectious diease team  · Consider possible transition to NGT vs PEG  · Follow WBC and trend fever curve; follow up blood cultures  · Nutrition Consultation and Bariatrics follow up   · Encourage goals of care conversation     4  Colitis; Possible C  Difficule Infection: Patient presenting with profuse diarrhea  Also febrile on arrival with a temperature of 101 4 and leukocytosis of 31 6  C  Diff testing performed on 8/31/22 - PCR positive and EIA negative  Unclear whether active infection vs colonization but given presenting symptoms, would recommend treatment at this time     · Continue PO Vanomycin 125 q6 hours   · Strict contact precautions and hand washing   · Monitor stool output; maintain hydration and replete electrolytes  · Monitor WBC and trend fever curve   · Infectious disease consulted; appreciate input    5  Transaminitis: Mild elevations in LFTs on arrival  AST//183, alk phos 193, and tbili 037  Hepatitis panel negative  LFTs now improving  Elevated likely due to sepsis with hypovolemia and recent antibiotic usage  · Continue to monitor and trend  · Avoid hepatotoxic agents at this time   ______________________________________________________________________    HPI:  Ms Giovanny Hauser is a 77year old female with past medical history of GERD, hypertension, iron deficiency anemia, and prior Savage-en-Y gastric bypass surgery complicated by anastomotic marginal ulceration and need for TPN due to malnutrition and further complicated by PICC line infection who initially presented to [facility] ED on 8/31/2022 with a complaint of generalized weakness and fatigue accompanied by diarrhea  Gastroenterology current consulted for anemia  At time of initial ED arrival, patient was febrile with a temperature of 101 4°  Additional initial vital signs as follows:  HR 96, /57, and RR 20 with SpO2 93% on room air  Labs demonstrating a significant leukocytosis of 31 6 with blood cultures growing Staph epidermidis in 1/2 blood cultures  Patient with elevated creatinine of 1 35 electrolytes within normal limits  AST//183, alkaline phosphatase 193, and total bilirubin 0 37  INR elevated at 1 21  Hemoglobin 8 2 on day of admission but dropped down to 6 1 on 09/01/2022 status post 1 unit PRBCs with improvement in hemoglobin to 7 5  Initial CT imaging demonstrating diffuse wall thickening of the colon with surrounding fat stranding suspicious for colitis  Patient seen and evaluated at time of consultation  Patient lying in bed comfortably in no acute pain/discomfort  TPN running  Patient complains of fatigue and weakness   No offering much history at this time but does deny abdominal pain  Following history obtained via extensive chart review  Summary of patient's recent hospitalizations:    05/24/22-05/28/22:  Patient presenting with generalized weakness and dizziness  Hemoglobin found to be 7 8 on arrival   Patient required blood transfusions during this hospitalization  Colonoscopy was negative but EGD revealed a large ulcer near the anastomotic site and distal gastric remnant  However no active bleeding was noted  Patient discharged home with outpatient GI follow-up  Hemoglobin 8 3 at time of discharge  06/11/22-06/28/22:  Patient once again admitted for acute blood loss anemia  Hemoglobin of 4 4 at time of ED arrival   Patient once again required blood transfusions  Repeat EGD completed on 06/12 and revealed a single large, deep, irregular benign-appearing ulcer in the gastrojejunal anastomosis with an adherent clot  Epinephrine was injected and 2 clips were placed during which hemostasis was achieved  However, patient then became hypotensive and tachycardic on 06/15 with the associated hemoglobin drop  Emergent EGD was performed and revealed a large common 2, irregular also in the gastrojejunal anastomosis with nonbleeding visible vessel  Bipolar cautery along with an epinephrine injection utilized during which hemostasis was once again achieved  Patient was then transferred to [facility] on 06/16 for bariatric surgery  During this hospitalization, a PICC line was placed and patient was initiated on TPN with plan for nutritional optimization and and potential for outpatient revision  Patient's hemoglobin remained stable following transfer and patient was then discharged with outpatient follow-up      07/26/22-08/08/22:  Patient presents to the hospital with fevers and chills  Patient was admitted to the ICU due to septic shock was found to have MSSA bacteremia secondary to PICC line infection  Patient managed with IV cefazolin for 7 day course  Additionally during this hospitalization , patient was tested for C difficile  PCR positive but EIA negative  For this she was treated with a prophylactic course of PO Vancomycin  PICC line replaced and patient continued on TPN  Following clearance of bacteremia, patient was discharged home  REVIEW OF SYSTEMS:    CONSTITUTIONAL: Denies any fever, chills, rigors, and weight loss  HEENT: No earache or tinnitus  Denies hearing loss or visual disturbances  CARDIOVASCULAR: No chest pain or palpitations  RESPIRATORY: Denies any cough, hemoptysis, shortness of breath or dyspnea on exertion  GASTROINTESTINAL: As noted in the History of Present Illness  GENITOURINARY: No problems with urination  Denies any hematuria or dysuria  NEUROLOGIC: No dizziness or vertigo, denies headaches  MUSCULOSKELETAL: Denies any muscle or joint pain  SKIN: Denies skin rashes or itching  ENDOCRINE: Denies excessive thirst  Denies intolerance to heat or cold  PSYCHOSOCIAL: Denies depression or anxiety  Denies any recent memory loss         Historical Information   Past Medical History:   Diagnosis Date   • Anemia    • Anxiety    • Bipolar disorder ([facility] [address] )    • Colon polyp    • Disease of thyroid gland    • Essential hypertension    • Essential tremor    • Fibromyalgia, primary    • GERD (gastroesophageal reflux disease)    • Inflammatory polyarthropathy (HCC)    • Mammogram abnormal    • Pressure injury of skin      Past Surgical History:   Procedure Laterality Date   • BREAST BIOPSY Right 2015    benign   • CARPAL TUNNEL RELEASE Bilateral    • COLONOSCOPY     • EGD AND COLONOSCOPY  01/01/2014   • GASTRIC BYPASS  07/01/2012   • MAMMO NEEDLE LOCALIZATION RIGHT (ALL INC) Right 2/6/2012   • MAMMO NEEDLE LOCALIZATION RIGHT (ALL INC) Right 2/6/2012   • ULNAR NERVE TRANSPOSITION Right      Social History   Social History     Substance and Sexual Activity   Alcohol Use Yes   • Alcohol/week: 4 0 standard drinks   • Types: 4 Glasses of wine per week    Comment: rare     Social History     Substance and Sexual Activity   Drug Use Never     Social History     Tobacco Use   Smoking Status Former Smoker   • Quit date:    • Years since quittin 6   Smokeless Tobacco Never Used     Family History   Problem Relation Age of Onset   • No Known Problems Mother    • No Known Problems Father    • No Known Problems Sister    • No Known Problems Maternal Grandmother    • No Known Problems Maternal Grandfather    • No Known Problems Paternal Grandmother    • No Known Problems Paternal Grandfather    • No Known Problems Sister    • No Known Problems Sister    • Stomach cancer Maternal Aunt    • No Known Problems Maternal Aunt    • No Known Problems Maternal Aunt    • No Known Problems Maternal Aunt    • No Known Problems Paternal Aunt    • Leukemia Other        Meds/Allergies     Medications Prior to Admission   Medication   • clonazePAM (KlonoPIN) 1 mg tablet   • folic acid (FOLVITE) 1 mg tablet   • levothyroxine 112 mcg tablet   • midodrine (PROAMATINE) 5 mg tablet   • sucralfate (CARAFATE) 1 g tablet   • thiamine (VITAMIN B1) 100 mg tablet   • traZODone (DESYREL) 50 mg tablet     Current Facility-Administered Medications   Medication Dose Route Frequency   • acetaminophen (TYLENOL) tablet 650 mg  650 mg Oral Q6H PRN   • Adult 3-in-1 TPN (standard base / standard electrolytes)   Intravenous Continuous TPN   • clonazePAM (KlonoPIN) tablet 3 mg  3 mg Oral HS   • folic acid (FOLVITE) tablet 1 mg  1 mg Oral Daily   • levothyroxine tablet 112 mcg  112 mcg Oral Early Morning   • midodrine (PROAMATINE) tablet 5 mg  5 mg Oral TID AC   • ondansetron (ZOFRAN) injection 4 mg  4 mg Intravenous Q6H PRN   • pantoprazole (PROTONIX) injection 40 mg  40 mg Intravenous Q12H RHONA   • thiamine tablet 100 mg  100 mg Oral Daily   • traZODone (DESYREL) tablet 150 mg  150 mg Oral HS   • vancomycin (VANCOCIN) IVPB (premix in dextrose) 1,000 mg 200 mL  15 mg/kg Intravenous Q24H • vancomycin (VANCOCIN) oral solution 125 mg  125 mg Oral Q6H [facility] & intermediate       No Known Allergies        Objective     Blood pressure 119/55, pulse 80, temperature 98 7 °F (37 1 °C), temperature source Axillary, resp  rate 20, weight 56 7 kg (125 lb), SpO2 93 %  Body mass index is 21 46 kg/m²  Intake/Output Summary (Last 24 hours) at 9/2/2022 0755  Last data filed at 9/1/2022 1738  Gross per 24 hour   Intake 3666 67 ml   Output --   Net 3666 67 ml         PHYSICAL EXAM:    Physical Exam  Constitutional:       General: She is not in acute distress  Appearance: She is ill-appearing  She is not toxic-appearing  Comments: Frail; cachetic appearing   HENT:      Head: Normocephalic and atraumatic  Nose: Nose normal  No congestion  Mouth/Throat:      Mouth: Mucous membranes are moist       Pharynx: Oropharynx is clear  No oropharyngeal exudate  Eyes:      General: No scleral icterus  Conjunctiva/sclera: Conjunctivae normal    Cardiovascular:      Rate and Rhythm: Normal rate and regular rhythm  Pulses: Normal pulses  Heart sounds: Normal heart sounds  No murmur heard  Pulmonary:      Effort: Pulmonary effort is normal  No respiratory distress  Breath sounds: Normal breath sounds  No wheezing or rales  Abdominal:      General: Abdomen is flat  Bowel sounds are normal  There is no distension  Palpations: Abdomen is soft  Tenderness: There is no abdominal tenderness  There is no guarding or rebound  Genitourinary:     Comments: Rectal tube in place with dark brown/green stool  Musculoskeletal:         General: Normal range of motion  Cervical back: Normal range of motion  Right lower leg: No edema  Left lower leg: No edema  Skin:     General: Skin is warm  Coloration: Skin is pale  Neurological:      Mental Status: She is alert     Psychiatric:         Mood and Affect: Mood normal          Behavior: Behavior normal            Lab Results: Admission on 08/30/2022   Component Date Value   • WBC 08/31/2022 31 68 (A)   • RBC 08/31/2022 2 70 (A)   • Hemoglobin 08/31/2022 8 2 (A)   • Hematocrit 08/31/2022 27 3 (A)   • MCV 08/31/2022 101 (A)   • MCH 08/31/2022 30 4    • MCHC 08/31/2022 30 0 (A)   • RDW 08/31/2022 19 6 (A)   • MPV 08/31/2022 10 7    • Platelets 05/81/0329 534 (A)   • nRBC 08/31/2022 0    • Neutrophils Relative 08/31/2022 79 (A)   • Immat GRANS % 08/31/2022 2    • Lymphocytes Relative 08/31/2022 11 (A)   • Monocytes Relative 08/31/2022 4    • Eosinophils Relative 08/31/2022 4    • Basophils Relative 08/31/2022 0    • Neutrophils Absolute 08/31/2022 24 81 (A)   • Immature Grans Absolute 08/31/2022 >0 50 (A)   • Lymphocytes Absolute 08/31/2022 3 38    • Monocytes Absolute 08/31/2022 1 36 (A)   • Eosinophils Absolute 08/31/2022 1 31 (A)   • Basophils Absolute 08/31/2022 0 13 (A)   • Sodium 08/31/2022 138    • Potassium 08/31/2022 4 0    • Chloride 08/31/2022 109 (A)   • CO2 08/31/2022 21    • ANION GAP 08/31/2022 8    • BUN 08/31/2022 43 (A)   • Creatinine 08/31/2022 1 35 (A)   • Glucose 08/31/2022 96    • Calcium 08/31/2022 8 6    • Corrected Calcium 08/31/2022 10 4 (A)   • AST 08/31/2022 115 (A)   • ALT 08/31/2022 183 (A)   • Alkaline Phosphatase 08/31/2022 193 (A)   • Total Protein 08/31/2022 6 9    • Albumin 08/31/2022 1 7 (A)   • Total Bilirubin 08/31/2022 0 37    • eGFR 08/31/2022 37    • LACTIC ACID 08/31/2022 1 0    • Procalcitonin 08/31/2022 0 54 (A)   • Protime 08/31/2022 15 6 (A)   • INR 08/31/2022 1 21 (A)   • PTT 08/31/2022 34    • Blood Culture 08/31/2022 No Growth at 48 hrs      • Gram Stain Result 08/31/2022 Gram positive cocci in clusters (A)   • Color, UA 08/31/2022 Light Orange    • Clarity, UA 08/31/2022 Extra Turbid    • Specific Gravity, UA 08/31/2022 1 012    • pH, UA 08/31/2022 8 0    • Leukocytes, UA 08/31/2022 Large (A)   • Nitrite, UA 08/31/2022 Positive (A)   • Protein, UA 08/31/2022 100 (2+) (A)   • Glucose, UA 08/31/2022 Negative    • Ketones, UA 08/31/2022 Negative    • Urobilinogen, UA 08/31/2022 <2 0    • Bilirubin, UA 08/31/2022 Negative    • Occult Blood, UA 08/31/2022 Small (A)   • Lipase 08/31/2022 140    • SARS-CoV-2 08/31/2022 Negative    • Ventricular Rate 08/31/2022 99    • Atrial Rate 08/31/2022 99    • AK Interval 08/31/2022 126    • QRSD Interval 08/31/2022 80    • QT Interval 08/31/2022 326    • QTC Interval 08/31/2022 418    • P Axis 08/31/2022 55    • QRS Axis 08/31/2022 30    • T Wave Axis 08/31/2022 -2    • C difficile toxin by PCR 08/31/2022 Positive (A)   • C difficile Toxins A+B, * 08/31/2022 Negative    • LACTIC ACID 08/31/2022 0 9    • RBC, UA 08/31/2022 Innumerable (A)   • WBC, UA 08/31/2022 Innumerable (A)   • Epithelial Cells 08/31/2022 None Seen    • Bacteria, UA 08/31/2022 Innumerable (A)   • MUCUS THREADS 08/31/2022 Occasional (A)   • Hyaline Casts, UA 08/31/2022 25-50 (A)   • WBC Clumps 08/31/2022 Present    • Platelets 52/40/6387 448 (A)   • MPV 09/01/2022 10 1    • Urine Culture 08/31/2022 80,000-89,000 cfu/ml - 2 strains Gram Negative Jerome Enteric Like (A)   • Urine Culture 08/31/2022 40,000-49,000 cfu/ml Proteus species (A)   • Urine Culture 08/31/2022 40,000-49,000 cfu/ml Enterococcus species (A)   • Hepatitis B Surface Ag 08/31/2022 Non-reactive    • Hep A IgM 08/31/2022 Non-reactive    • Hepatitis C Ab 08/31/2022 Non-reactive    • Hep B C IgM 08/31/2022 Non-reactive    • Staphylococcus epidermid* 08/31/2022 Detected (A)   • mecA/C (Methicillin-resi* 08/31/2022 Detected (A)   • Sodium 09/01/2022 139    • Potassium 09/01/2022 3 6    • Chloride 09/01/2022 112 (A)   • CO2 09/01/2022 21    • ANION GAP 09/01/2022 6    • BUN 09/01/2022 36 (A)   • Creatinine 09/01/2022 1 19    • Glucose 09/01/2022 106    • Calcium 09/01/2022 8 2 (A)   • Corrected Calcium 09/01/2022 10 3 (A)   • AST 09/01/2022 53 (A)   • ALT 09/01/2022 115 (A)   • Alkaline Phosphatase 09/01/2022 149 (A)   • Total Protein 09/01/2022 6 3 (A)   • Albumin 09/01/2022 1 4 (A)   • Total Bilirubin 09/01/2022 0 18 (A)   • eGFR 09/01/2022 43    • Magnesium 09/01/2022 2 4    • Phosphorus 09/01/2022 4 4 (A)   • WBC 09/01/2022 14 01 (A)   • RBC 09/01/2022 2 08 (A)   • Hemoglobin 09/01/2022 6 4 (A)   • Hematocrit 09/01/2022 20 9 (A)   • MCV 09/01/2022 101 (A)   • MCH 09/01/2022 30 3    • MCHC 09/01/2022 30 1 (A)   • RDW 09/01/2022 19 6 (A)   • MPV 09/01/2022 9 9    • Platelets 18/90/5960 481 (A)   • nRBC 09/01/2022 0    • Neutrophils Relative 09/01/2022 67    • Immat GRANS % 09/01/2022 2    • Lymphocytes Relative 09/01/2022 13 (A)   • Monocytes Relative 09/01/2022 7    • Eosinophils Relative 09/01/2022 11 (A)   • Basophils Relative 09/01/2022 0    • Neutrophils Absolute 09/01/2022 9 37 (A)   • Immature Grans Absolute 09/01/2022 0 32 (A)   • Lymphocytes Absolute 09/01/2022 1 82    • Monocytes Absolute 09/01/2022 0 93    • Eosinophils Absolute 09/01/2022 1 53 (A)   • Basophils Absolute 09/01/2022 0 04    • Hemoglobin 09/01/2022 6 1 (A)   • Hematocrit 09/01/2022 19 1 (A)   • ABO Grouping 09/01/2022 B    • Rh Factor 09/01/2022 Positive    • Antibody Screen 09/01/2022 Negative    • Specimen Expiration Date 09/01/2022 [id number]    • Unit Product Code 09/02/2022 [id number]    • Unit Number 09/02/2022 [id number]    • Unit ABO 09/02/2022 B    • Unit [facility] 09/02/2022 POS    • Crossmatch 09/02/2022 Compatible    • Unit Dispense Status 09/02/2022 Presumed Trans    • Unit Product Volume 09/02/2022 350    • WBC 09/02/2022 8 94    • RBC 09/02/2022 2 47 (A)   • Hemoglobin 09/02/2022 7 5 (A)   • Hematocrit 09/02/2022 23 9 (A)   • MCV 09/02/2022 97    • MCH 09/02/2022 30 4    • MCHC 09/02/2022 31 4    • RDW 09/02/2022 19 0 (A)   • Platelets 39/86/2192 461 (A)   • MPV 09/02/2022 9 4        Imaging Studies: I have personally reviewed pertinent imaging studies

## 2022-09-02 NOTE — ASSESSMENT & PLAN NOTE
· Patient with history of gastric bypass surgery  Has had significant malnutrition issues and anastomotic ulcerations  Seen by Bariatric Service during recent hospitalization at [facility] who recommended prolonged course of TPN for nutritional optimization and possible revision of surgery in the future  · Was planned to have appt with bariatric sx on 8/20 however had to be rescheduled  · Given recurrent bacteremia will need alternate form of feeding  S/p GI consult, appreciate input  Also recommending consideration to d/c TPN  However pt refusing for placement of PEG or NGT  J tube not a consideration given plans for reversal of [place]-en-y in the future  · Pt wishes to discuss further with her brother  But it appears may be leaning toward just having PO feeds     · Palliative on board to aide in addressing goals of care

## 2022-09-02 NOTE — ASSESSMENT & PLAN NOTE
Malnutrition Findings:   Adult Malnutrition type: Chronic illness  Adult Degree of Malnutrition: Malnutrition of moderate degree  Malnutrition Characteristics: Fat loss, Muscle loss                  360 Statement: Moderate malnutrition in setting of chronic illness related to ongoing GI issues/malabsorption as evidenced by severely sunken orbital lobes, sunken temporal lobes, severely protruding clavicular bone    BMI Findings: Body mass index is 21 46 kg/m²  Hx of Savage-en Y gastric bypass, 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  Pt refusing NGT or PEG  Will have to consider d/c of TPN given recurrent bacteremia   Pt wishes to discuss further with her brother

## 2022-09-02 NOTE — ANESTHESIA PREPROCEDURE EVALUATION
Procedure:  EGD    Relevant Problems   CARDIO   (+) Dyspnea on exertion      ENDO   (+) Hypothyroidism      GI/HEPATIC   (+) Gastric ulcer   (+) Gastroesophageal reflux disease without esophagitis   (+) H/O bariatric surgery - bypass      /RENAL   (+) Acute kidney insufficiency      HEMATOLOGY   (+) Acute blood loss anemia   (+) Acute on chronic anemia   (+) Anemia      MUSCULOSKELETAL   (+) Fibromyalgia syndrome   (+) Fibromyalgia, primary      NEURO/PSYCH   (+) Depression   (+) Fibromyalgia syndrome   (+) Fibromyalgia, primary      PULMONARY   (+) Dyspnea on exertion   (+) Shortness of breath   TTE 9 2 22  Left Ventricle: Left ventricular cavity size is normal  Wall thickness is normal  The left ventricular ejection fraction is 75%  Systolic function is normal  Wall motion is normal  Diastolic function is normal     Right Ventricle: Right ventricular cavity size is normal  Systolic function is normal     Atrial Septum: The septum bows into the right atrium, suggesting increased left atrial pressure    Aortic Valve: The aortic valve is trileaflet  The leaflets are not thickened  The leaflets are moderately calcified  The leaflets exhibit normal mobility    Mitral Valve: There is moderate regurgitation    Tricuspid Valve: There is mild to moderate regurgitation            Physical Exam    Airway      TM Distance: >3 FB  Neck ROM: limited     Dental       Cardiovascular  Cardiovascular exam normal    Pulmonary  Pulmonary exam normal     Other Findings        Anesthesia Plan  ASA Score- 4     Anesthesia Type- IV sedation with anesthesia with ASA Monitors  Additional Monitors:   Airway Plan:           Plan Factors-Exercise tolerance (METS): <4 METS  Chart reviewed  EKG reviewed  Existing labs reviewed  Patient summary reviewed  Induction- intravenous  Postoperative Plan-     Informed Consent- Anesthetic plan and risks discussed with patient    I personally reviewed this patient with the CRNA  Discussed and agreed on the Anesthesia Plan with the CRNA  Marlo Augustus is a 76 y o  female presenting today for EGD   Pertinent medical history includes: curent admission for bactermia, moderate protein malnutiriton  History of anesthesia : no issues  NPO approrpaite, currently on TPN  Denies N/V, F, chills, CP, SOB  AQA  Plan for MAC

## 2022-09-02 NOTE — ASSESSMENT & PLAN NOTE
· Palliative consulted  · Discussed with her at length today   She does not wish to have feeding tube  · She remains full code for now

## 2022-09-02 NOTE — ASSESSMENT & PLAN NOTE
Possibly secondary to increased losses from the GI tract due to diarrhea vs GI Bleed  · Cr trended down  · Avoid nephrotoxins and renally dose meds

## 2022-09-03 PROBLEM — G93.40 ACUTE ENCEPHALOPATHY: Status: ACTIVE | Noted: 2022-09-03

## 2022-09-03 NOTE — ASSESSMENT & PLAN NOTE
· GI on board  · 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  · S/p recent EGD in 7/22 revealing: Residual marginal ulcer of the gastrojejunostomy anastomosis with improved size  · S/p EGD 9/2 revealing: Large, cratered ulcer in the gastrojejunal anastomosis   · Received PRBC transfusion x 1   · Resumed back on diet  · Cont PPI BID  · H/H stable

## 2022-09-03 NOTE — ASSESSMENT & PLAN NOTE
· Secondary to hospital induced delirium  · No focal deficit on exam  · MS slightly improving  · Delirium precautions

## 2022-09-03 NOTE — PROGRESS NOTES
[address] [facility]  Progress Note - Cayetano Blunt 1945, 76 y o  female MRN: [id number]  Unit/Bed#: [facility] 317-01 Encounter: [id number]  Primary Care Provider: Manju Silveira MD   Date and time admitted to hospital: 8/30/2022 11:49 PM    * Sepsis [facility])  Assessment & Plan  · POA; Met SIRs criteria with fever, leukocytosis   · Source with bacteremia, possible C diff colitis  · Infectious disease following  · + evidence of colitis on ct scan  Status post C diff panel; positive toxin PCR but negative EIA  On p o  Vancomycin  · Positive recurrent bacteremia  Presumed secondary to PICC line with use of TPN  Prior history of recent MSSE  · Repeat blood cx neg thus far  · On IV vancomycin  · Ur cx polymicrobial but non significant colony ct  CTX discontinued   · Leukocytosis resolved      Bacteremia  Assessment & Plan  · Recent hx of staph epi bacteremia in 7/2022, presumed due to picc line  · Now with possible recurrence 1/2 set staph epi, 2nd set no growth thus far  · ID on board  · On IV vancomycin  · May need to have PICC removed  But will leave in place for now given poor IV access, ok per ID  · Pt will need TAVIA  However will place on hold for now given present ongoing discussion in regards to goals of care  Pt has indicated if positive she would not want to be on prolonged abx  S/p TTE in 7/2022  · Repeat blood cx neg x 1 day  · GI consulted for alternate feeding, recommends to d/c TPN  · D/w pt by myself and GI does not wish to have feeding tube  And J tube not a consideration as planned for redo of [place] en y  Palliative on board in addressing goals of care  · D/w Pt and her family and are in agreement with discontinuation of TPN  Have consulted nutrition for monitor of caloric intake  Glucose monitoring; hypoglycemic protocol    H/O bariatric surgery - bypass  Assessment & Plan  · Patient with history of gastric bypass surgery    Has had significant malnutrition issues and anastomotic ulcerations  Seen by Bariatric Service during recent hospitalization at [facility] who recommended prolonged course of TPN for nutritional optimization and possible revision of surgery in the future  · Was planned to have appt with bariatric sx on 8/20 however had to be rescheduled  · Given recurrent bacteremia; s/p GI consult have recommended discontinuation of TPN  However pt refusing for placement of PEG or NGT  J tube not a consideration given plans for reversal of savage-en-y in the future  · Pt leaning towards just having po feeds  Nutrition to follow, for monitoring of caloric intake   · Palliative on board to aide in addressing goals of care     Acute on chronic anemia  Assessment & Plan  · GI on board  · 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  · S/p recent EGD in 7/22 revealing: Residual marginal ulcer of the gastrojejunostomy anastomosis with improved size  · S/p EGD 9/2 revealing: Large, cratered ulcer in the gastrojejunal anastomosis   · Received PRBC transfusion x 1   · Resumed back on diet  · Cont PPI BID  · H/H stable    Moderate protein-calorie malnutrition ([address] )  Assessment & Plan  Malnutrition Findings:   Adult Malnutrition type: Chronic illness  Adult Degree of Malnutrition: Malnutrition of moderate degree  Malnutrition Characteristics: Fat loss, Muscle loss                  360 Statement: Moderate malnutrition in setting of chronic illness related to ongoing GI issues/malabsorption as evidenced by severely sunken orbital lobes, sunken temporal lobes, severely protruding clavicular bone    BMI Findings: Body mass index is 21 46 kg/m²  Hx of Savage-en Y gastric bypass, 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  Pt refusing NGT or PEG  Nutrition consulted    Goals of care, counseling/discussion  Assessment & Plan  · Palliative on board  Awaiting for arrival of pt's siblings   In the process of arranging family mtg  · She remains full code for now      Acute encephalopathy  Assessment & Plan  · Secondary to hospital induced delirium  · No focal deficit on exam  · MS slightly improving  · Delirium precautions    Acute kidney insufficiency  Assessment & Plan  Possibly secondary to increased losses from the GI tract due to diarrhea vs GI Bleed  · Cr trended down  · Avoid nephrotoxins and renally dose meds    Ambulatory dysfunction  Assessment & Plan  · Exacerbated by diarrhea/dehydration however patient is chronically malnourished  · S/p PT/OT evaluation; to return to SNF    Elevated LFTs  Assessment & Plan  With incidental finding of cholelithiasis however generally the gallbladder is unremarkable on CT A/P    · Patient is also on TPN at baseline   · Trending down    Depression  Assessment & Plan  · On trazodone  Hypothyroidism  Assessment & Plan  · Continue levothyroxine       VTE Pharmacologic Prophylaxis:   Pharmacologic: Pharmacologic VTE Prophylaxis contraindicated due to Anemia  Mechanical VTE Prophylaxis in Place: Yes    Patient Centered Rounds: I have performed bedside rounds with nursing staff today  Discussions with Specialists or Other Care Team Provider: Palliative    Education and Discussions with Family / Patient: Patient  Her brother in law Rasta  I also called her sister Aiden Denson    Time Spent for Care: 30 minutes  More than 50% of total time spent on counseling and coordination of care as described above  Current Length of Stay: 3 day(s)    Current Patient Status: Inpatient   Certification Statement: The patient will continue to require additional inpatient hospital stay due to acute illness    Discharge Plan:     Code Status: Level 1 - Full Code      Subjective:   OVernight was confused thought she was in college and was impulsive trying to get out of bed   Required a vest posey  Now more oriented, and easily redirectable  Continues to insist that she does not want a feeding tube  States she really wants to eat potato chips  Was very tearful stating that she wishes to return home and misses her family    Objective:     Vitals:   Temp (24hrs), Av 6 °F (36 4 °C), Min:97 2 °F (36 2 °C), Max:97 9 °F (36 6 °C)    Temp:  [97 2 °F (36 2 °C)-97 9 °F (36 6 °C)] 97 8 °F (36 6 °C)  HR:  [66-89] 68  Resp:  [16-25] 25  BP: (113-128)/(55-65) 117/56  SpO2:  [91 %-97 %] 94 %  Body mass index is 21 46 kg/m²  Input and Output Summary (last 24 hours): Intake/Output Summary (Last 24 hours) at 9/3/2022 1422  Last data filed at 2022 1749  Gross per 24 hour   Intake 100 ml   Output 1600 ml   Net -1500 ml       Physical Exam:     Physical Exam  Constitutional:       Comments: Cachectic   Cardiovascular:      Rate and Rhythm: Normal rate and regular rhythm  Pulses: Normal pulses  Heart sounds: Murmur heard  Pulmonary:      Effort: Pulmonary effort is normal  No respiratory distress  Breath sounds: Normal breath sounds  No wheezing or rales  Abdominal:      General: Abdomen is flat  Bowel sounds are normal  There is no distension  Palpations: Abdomen is soft  Tenderness: There is no abdominal tenderness  There is no guarding  Musculoskeletal:         General: Normal range of motion  Right lower leg: No edema  Skin:     General: Skin is warm and dry  Neurological:      General: No focal deficit present  Mental Status: She is alert  Mental status is at baseline  She is disoriented  Cranial Nerves: No cranial nerve deficit  Motor: No weakness  Additional Data:     Labs:    Results from last 7 days   Lab Units 22  0518 22  1203 22  0618   WBC Thousand/uL 8 95   < > 14 01*   HEMOGLOBIN g/dL 7 7*   < > 6 4*   HEMATOCRIT % 24 2*   < > 20 9*   PLATELETS Thousands/uL 483*   < > 481*   NEUTROS PCT %  --   --  67   LYMPHS PCT %  --   --  13*   MONOS PCT %  --   --  7   EOS PCT %  --   --  11*    < > = values in this interval not displayed       Results from last 7 days   Lab Units 09/03/22  0518   SODIUM mmol/L 138   POTASSIUM mmol/L 3 5   CHLORIDE mmol/L 112*   CO2 mmol/L 23   BUN mg/dL 22   CREATININE mg/dL 1 14   ANION GAP mmol/L 3*   CALCIUM mg/dL 8 7   ALBUMIN g/dL 1 5*   TOTAL BILIRUBIN mg/dL 0 19*   ALK PHOS U/L 130*   ALT U/L 80*   AST U/L 41   GLUCOSE RANDOM mg/dL 104     Results from last 7 days   Lab Units 08/31/22  0212   INR  1 21*             Results from last 7 days   Lab Units 09/02/22  1042 08/31/22  0415 08/31/22  0212 08/31/22  0206   LACTIC ACID mmol/L  --  0 9 1 0  --    PROCALCITONIN ng/ml 0 40*  --   --  0 54*           * I Have Reviewed All Lab Data Listed Above  * Additional Pertinent Lab Tests Reviewed: All Labs Within Last 24 Hours Reviewed    Imaging:    Imaging Reports Reviewed Today Include:   Imaging Personally Reviewed by Myself Includes:      Recent Cultures (last 7 days):     Results from last 7 days   Lab Units 09/02/22  1044 08/31/22  0635 08/31/22  0501 08/31/22  0206   BLOOD CULTURE  No Growth at 24 hrs  No Growth at 24 hrs   --   --  Staphylococcus coagulase negative*  No Growth at 72 hrs     GRAM STAIN RESULT   --   --   --  Gram positive cocci in clusters*   URINE CULTURE   --   --  80,000-89,000 cfu/ml - 2 strains Gram Negative Jerome Enteric Like*  40,000-49,000 cfu/ml Proteus species*  40,000-49,000 cfu/ml Enterococcus species*  --    C DIFF TOXIN B BY PCR   --  Positive*  --   --        Last 24 Hours Medication List:   Current Facility-Administered Medications   Medication Dose Route Frequency Provider Last Rate   • acetaminophen  650 mg Oral Q6H PRN Dana Moran MD     • Adult TPN (STANDARD BASE/STANDARD ELECTROLYTE)   Intravenous Continuous TPN Yung Grace DO 41 6 mL/hr at 09/02/22 2127   • ALPRAZolam  0 25 mg Oral TID PRN Yung Grace DO     • alteplase  2 mg Intracatheter Once [employer], PA-C     • clonazePAM  3 mg Oral HS Dana Moran MD     • folic acid  1 mg Oral Daily Dana Moran MD • levothyroxine  112 mcg Oral Early Morning Meaghan Jansen MD     • midodrine  5 mg Oral TID AC Car Chairez MD     • ondansetron  4 mg Intravenous Q6H PRN Meaghan Jansen MD     • pantoprazole  40 mg Intravenous Q12H [address] Yann Thompson DO     • thiamine  100 mg Oral Daily Meaghan Jansen MD     • traZODone  150 mg Oral HS Meaghan Jansen MD     • vancomycin  15 mg/kg Intravenous Q24H Leana Koehler PA-C 1,000 mg (09/03/22 0521)   • vancomycin  125 mg Oral Q6H Sarath Dhaliwal MD          Today, Patient Was Seen By: Yann Thompson DO    ** Please Note: Dictation voice to text software may have been used in the creation of this document   **

## 2022-09-03 NOTE — NUTRITION
22 1447   Recommendations/Interventions   Recommendations to Provider 1  let current TPN /do not re-order TPN for 9/3 at 2100  consider glucose POC at 2200 tonight to monitor for rebound hypoglycemia  2  adjust diet to bariatric maintenance; only food preference pt able to provide RD was chocolate ice cream  3  ordered 2 day calorie count and posted on door to better assess adequacy of po intake  4  added supplements: ensure clear at breakfast, chocolate magic cup at lunch and ensure max at dinner       3:35PM addendum: per discussion with RN: pt ate 100% of breakfast, pt with specific food preferences that are not allowed on bariatric maintenance diet  Per discussion with MD: ok to liberalize to regular house diet - order updated

## 2022-09-03 NOTE — ASSESSMENT & PLAN NOTE
Malnutrition Findings:   Adult Malnutrition type: Chronic illness  Adult Degree of Malnutrition: Malnutrition of moderate degree  Malnutrition Characteristics: Fat loss, Muscle loss                  360 Statement: Moderate malnutrition in setting of chronic illness related to ongoing GI issues/malabsorption as evidenced by severely sunken orbital lobes, sunken temporal lobes, severely protruding clavicular bone    BMI Findings: Body mass index is 21 46 kg/m²     Hx of Savage-en Y gastric bypass, 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  Pt refusing NGT or PEG  Nutrition consulted

## 2022-09-03 NOTE — ASSESSMENT & PLAN NOTE
· Recent hx of staph epi bacteremia in 7/2022, presumed due to picc line  · Now with possible recurrence 1/2 set staph epi, 2nd set no growth thus far  · ID on board  · On IV vancomycin  · May need to have PICC removed  But will leave in place for now given poor IV access, ok per ID  · Pt will need TAVIA  However will place on hold for now given present ongoing discussion in regards to goals of care  Pt has indicated if positive she would not want to be on prolonged abx  S/p TTE in 7/2022  · Repeat blood cx neg x 1 day  · GI consulted for alternate feeding, recommends to d/c TPN  · D/w pt by myself and GI does not wish to have feeding tube  And J tube not a consideration as planned for redo of [place] en y  Palliative on board in addressing goals of care  · D/w Pt and her family and are in agreement with discontinuation of TPN  Have consulted nutrition for monitor of caloric intake   Glucose monitoring; hypoglycemic protocol

## 2022-09-03 NOTE — ASSESSMENT & PLAN NOTE
· POA; Met SIRs criteria with fever, leukocytosis   · Source with bacteremia, possible C diff colitis  · Infectious disease following  · + evidence of colitis on ct scan  Status post C diff panel; positive toxin PCR but negative EIA  On p o  Vancomycin  · Positive recurrent bacteremia  Presumed secondary to PICC line with use of TPN  Prior history of recent MSSE  · Repeat blood cx neg thus far  · On IV vancomycin  · Ur cx polymicrobial but non significant colony ct   CTX discontinued   · Leukocytosis resolved

## 2022-09-03 NOTE — ASSESSMENT & PLAN NOTE
· Palliative on board  Awaiting for arrival of pt's siblings   In the process of arranging family mtg  · She remains full code for now

## 2022-09-03 NOTE — ASSESSMENT & PLAN NOTE
· Patient with history of gastric bypass surgery  Has had significant malnutrition issues and anastomotic ulcerations  [facility] recommended prolonged course of TPN for nutritional optimization and possible revision of surgery in the future  · Was planned to have appt with bariatric sx on 8/20 however had to be rescheduled  · Given recurrent bacteremia; s/p GI consult have recommended discontinuation of TPN  However pt refusing for placement of PEG or NGT  J tube not a consideration given plans for reversal of [place]-en-y in the future  · Pt leaning towards just having po feeds   Nutrition to follow, for monitoring of caloric intake   · Palliative on board to aide in addressing goals of care

## 2022-09-03 NOTE — PROGRESS NOTES
Vancomycin IV Pharmacy-to-Dose Consultation    Belia Hernandez is a 76 y o  female who is currently receiving vancomycin IV with management by the Pharmacy Consult service  Assessment/Plan:    The patient's chart was reviewed  Renal function is stable  There are no signs or symptoms of nephrotoxicity and/or infusion reactions documented  Based on today's assessment, will continue current vancomycin (Day # 3) dosing of 1000 mg IV q24h, with a plan for trough to be drawn 9/4 at approximately 0330  We will continue to follow the patient's culture results and clinical progress daily      Thank you,  Janell Sheffield, PharmD, [place] [address] Pharmacist  [phone]

## 2022-09-04 NOTE — ASSESSMENT & PLAN NOTE
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

## 2022-09-04 NOTE — ASSESSMENT & PLAN NOTE
· Palliative on board  Awaiting for arrival of pt's siblings   In the process of arranging family mtg for tuesday  · She remains full code for now

## 2022-09-04 NOTE — PROGRESS NOTES
[facility]  Progress Note - Cameron Smith 1945, 76 y o  female MRN: [id number]  Unit/Bed#: [facility] 317-01 Encounter: [id number]  Primary Care Provider: Refugio Almanza MD   Date and time admitted to hospital: 8/30/2022 11:49 PM    * Sepsis [facility])  Assessment & Plan  · POA; Met SIRs criteria with fever, leukocytosis   · Source with bacteremia, possible C diff colitis  · Infectious disease following  · + evidence of colitis on ct scan  Status post C diff panel; positive toxin PCR but negative EIA  On p o  Vancomycin  · Positive recurrent bacteremia  Presumed secondary to PICC line with use of TPN  Prior history of recent MSSE  · Repeat blood cx neg thus far  · On IV vancomycin  · Ur cx polymicrobial but non significant colony ct  CTX discontinued   · Trend wbc      Bacteremia  Assessment & Plan  · Recent hx of staph epi bacteremia in 7/2022, presumed due to picc line  · Now with possible recurrence 1/2 set staph epi, 2nd set no growth thus far  · ID on board  · On IV vancomycin  · Will need to have PICC removed  But will leave in place for now given poor IV access, ok per ID  · Pt will need TAVIA  However will place on hold for now given present ongoing discussion in regards to goals of care  Pt has indicated if positive she would not want to be on prolonged abx  S/p TTE in 7/2022  · Repeat blood cx neg  · GI consulted for alternate feeding, recommends to d/c TPN  · D/w pt by myself and GI does not wish to have feeding tube  And J tube not a consideration as planned for redo of [place] en y  Palliative on board in addressing goals of care  · D/w Pt and her family and are in agreement with discontinuation of TPN  H/O bariatric surgery - bypass  Assessment & Plan  · Patient with history of gastric bypass surgery  Has had significant malnutrition issues and anastomotic ulcerations    Seen by Bariatric Service during recent hospitalization at [facility] who recommended prolonged course of TPN for nutritional optimization and possible revision of surgery in the future  · Was planned to have appt with bariatric sx on 8/20 however had to be rescheduled  · Given recurrent bacteremia; s/p GI consult have recommended discontinuation of TPN  However pt refusing for placement of PEG or NGT  J tube not a consideration given plans for reversal of savage-en-y in the future  · Pt leaning towards just having po feeds  · Nutrition on board, caloric intake monitoring  Liberize diet, nutrition supplements   · Glucose monitoring, hypoglycemic protocol in place    Acute on chronic anemia  Assessment & Plan  · GI on board  · 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  · S/p recent EGD in 7/22 revealing: Residual marginal ulcer of the gastrojejunostomy anastomosis with improved size  · S/p EGD 9/2 revealing: Large, cratered ulcer in the gastrojejunal anastomosis   · Received PRBC transfusion x 1   · Resumed back on diet  · Cont PPI BID, carafate  · H/H stable    Moderate protein-calorie malnutrition ([address] )  Assessment & Plan  Malnutrition Findings:   Adult Malnutrition type: Chronic illness  Adult Degree of Malnutrition: Malnutrition of moderate degree  Malnutrition Characteristics: Fat loss, Muscle loss                  360 Statement: Moderate malnutrition in setting of chronic illness related to ongoing GI issues/malabsorption as evidenced by severely sunken orbital lobes, sunken temporal lobes, severely protruding clavicular bone    BMI Findings: Body mass index is 21 46 kg/m²  Hx of Savage-en Y gastric bypass, 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  Pt refusing NGT or PEG  Nutrition following    Goals of care, counseling/discussion  Assessment & Plan  · Palliative on board  Awaiting for arrival of pt's siblings   In the process of arranging family mtg for tuesday  · She remains full code for now      Acute encephalopathy  Assessment & Plan  · Secondary to hospital induced delirium vs metabolic encephalopathy  · No focal deficit on exam  · MS improved today  · Delirium precautions    Acute kidney insufficiency  Assessment & Plan  Possibly secondary to increased losses from the GI tract due to diarrhea vs GI Bleed  · Cr trended down  · Avoid nephrotoxins and renally dose meds    Ambulatory dysfunction  Assessment & Plan  · Exacerbated by diarrhea/dehydration however patient is chronically malnourished  · S/p PT/OT evaluation; to return to SNF      VTE Pharmacologic Prophylaxis:   Pharmacologic: Pharmacologic VTE Prophylaxis contraindicated due to anemia  Mechanical VTE Prophylaxis in Place: Yes    Patient Centered Rounds: I have performed bedside rounds with nursing staff today  Discussions with Specialists or Other Care Team Provider:     Education and Discussions with Family / Patient: patient and her family    Time Spent for Care: 30 minutes  More than 50% of total time spent on counseling and coordination of care as described above  Current Length of Stay: 4 day(s)    Current Patient Status: Inpatient   Certification Statement: The patient will continue to require additional inpatient hospital stay due to Acute illness    Discharge Plan:    Code Status: Level 1 - Full Code      Subjective:   MS is much improved  Off restraints  Family at bedside  Afebrile  Diarrhea persists  Denies abd pain  Making effort to eat more    Objective:     Vitals:   Temp (24hrs), Av 8 °F (36 6 °C), Min:97 6 °F (36 4 °C), Max:97 9 °F (36 6 °C)    Temp:  [97 6 °F (36 4 °C)-97 9 °F (36 6 °C)] 97 6 °F (36 4 °C)  HR:  [81-92] 81  Resp:  [16] 16  BP: (124-127)/(60-67) 124/60  SpO2:  [92 %-95 %] 95 %  Body mass index is 21 46 kg/m²  Input and Output Summary (last 24 hours):     No intake or output data in the 24 hours ending 22 [id number]    Physical Exam:     Physical Exam  Constitutional:       Comments: Cachectic   Cardiovascular:      Rate and Rhythm: Normal rate and regular rhythm        Pulses: Normal pulses  Heart sounds: Normal heart sounds  No murmur heard  Pulmonary:      Effort: Pulmonary effort is normal  No respiratory distress  Breath sounds: Normal breath sounds  No wheezing or rales  Abdominal:      General: Abdomen is flat  Bowel sounds are normal  There is no distension  Palpations: Abdomen is soft  Tenderness: There is no abdominal tenderness  There is no guarding  Musculoskeletal:         General: Normal range of motion  Right lower leg: No edema  Left lower leg: No edema  Skin:     General: Skin is warm and dry  Neurological:      General: No focal deficit present  Mental Status: She is alert and oriented to person, place, and time  Mental status is at baseline  Cranial Nerves: No cranial nerve deficit  Motor: No weakness  Additional Data:     Labs:    Results from last 7 days   Lab Units 09/04/22  0513 09/01/22  1203 09/01/22  0618   WBC Thousand/uL 11 25*   < > 14 01*   HEMOGLOBIN g/dL 8 8*   < > 6 4*   HEMATOCRIT % 27 9*   < > 20 9*   PLATELETS Thousands/uL 495*   < > 481*   NEUTROS PCT %  --   --  67   LYMPHS PCT %  --   --  13*   MONOS PCT %  --   --  7   EOS PCT %  --   --  11*    < > = values in this interval not displayed       Results from last 7 days   Lab Units 09/04/22  0513   SODIUM mmol/L 137   POTASSIUM mmol/L 3 9   CHLORIDE mmol/L 108   CO2 mmol/L 23   BUN mg/dL 20   CREATININE mg/dL 1 12   ANION GAP mmol/L 6   CALCIUM mg/dL 8 9   ALBUMIN g/dL 1 6*   TOTAL BILIRUBIN mg/dL 0 28   ALK PHOS U/L 141*   ALT U/L 73   AST U/L 44   GLUCOSE RANDOM mg/dL 83     Results from last 7 days   Lab Units 08/31/22  0212   INR  1 21*     Results from last 7 days   Lab Units 09/04/22  1609 09/04/22  0613 09/04/22  0008 09/03/22  2121   POC GLUCOSE mg/dl 88 121 88 91         Results from last 7 days   Lab Units 09/02/22  1042 08/31/22  0415 08/31/22  0212 08/31/22  0206   LACTIC ACID mmol/L  --  0 9 1 0  --    PROCALCITONIN ng/ml 0 40* --   --  0 54*           * I Have Reviewed All Lab Data Listed Above  * Additional Pertinent Lab Tests Reviewed: All Labs Within Last 24 Hours Reviewed    Imaging:    Imaging Reports Reviewed Today Include:   Imaging Personally Reviewed by Myself Includes:      Recent Cultures (last 7 days):     Results from last 7 days   Lab Units 09/02/22  1044 08/31/22  0635 08/31/22  0501 08/31/22  0206   BLOOD CULTURE  No Growth at 48 hrs  No Growth at 48 hrs   --   --  Staphylococcus epidermidis*  No Growth After 4 Days     GRAM STAIN RESULT   --   --   --  Gram positive cocci in clusters*   URINE CULTURE   --   --  80,000-89,000 cfu/ml - 2 strains Gram Negative Jerome Enteric Like*  40,000-49,000 cfu/ml Proteus species*  40,000-49,000 cfu/ml Enterococcus species*  --    C DIFF TOXIN B BY PCR   --  Positive*  --   --        Last 24 Hours Medication List:   Current Facility-Administered Medications   Medication Dose Route Frequency Provider Last Rate   • acetaminophen  650 mg Oral Q6H PRN Aubree Victor MD     • ALPRAZolam  0 25 mg Oral TID PRN Ilda Boudreaux DO     • alteplase  2 mg Intracatheter Once Rekha Stanley PA-C     • clonazePAM  3 mg Oral HS Aubree Victor MD     • folic acid  1 mg Oral Daily Aubree Victor MD     • levothyroxine  112 mcg Oral Early Morning Aubree Victor MD     • midodrine  5 mg Oral TID AC Aubree Victor MD     • ondansetron  4 mg Intravenous Q6H PRN Aubree Victor MD     • pantoprazole  40 mg Intravenous Q12H [address] Kaylan Arroyo DO     • sucralfate  1 g Oral 4x Daily (AC & HS) Ilda Boudreaux DO     • thiamine  100 mg Oral Daily Aubree Victor MD     • traZODone  150 mg Oral HS Aubree Victor MD     • [START ON 9/5/2022] vancomycin  1,250 mg Intravenous Q24H Verna Meyer MD     • vancomycin  125 mg Oral Q6H [address] Aubree Victor MD          Today, Patient Was Seen By: Ilda Boudreaux DO    ** Please Note: Dictation voice to text software may have been used in the creation of this document   **

## 2022-09-04 NOTE — PROGRESS NOTES
Vancomycin Assessment    Bora Ramsay is a 76 y o  female who is currently receiving vancomycin 1000mg IV q24h for bacteremia     Relevant clinical data and objective history reviewed:  Creatinine   Date Value Ref Range Status   09/04/2022 1 12 0 60 - 1 30 mg/dL Final     Comment:     Standardized to IDMS reference method   09/03/2022 1 14 0 60 - 1 30 mg/dL Final     Comment:     Standardized to IDMS reference method   09/02/2022 1 12 0 60 - 1 30 mg/dL Final     Comment:     Standardized to IDMS reference method     Vancomycin Rm   Date Value Ref Range Status   07/27/2022 16 0 10 0 - 20 0 ug/mL Final     /67 (BP Location: Left arm)   Pulse 92   Temp 97 9 °F (36 6 °C) (Oral)   Resp 16   Ht 5' 4" (1 626 m)   Wt 56 7 kg (125 lb)   SpO2 92%   BMI 21 46 kg/m²   I/O last 3 completed shifts: In: 100 [I V :100]  Out: 1600 [Urine:800; Stool:800]  Lab Results   Component Value Date/Time    BUN 20 09/04/2022 05:13 AM    BUN 17 11/14/2020 10:02 AM    WBC 11 25 (H) 09/04/2022 05:13 AM    HGB 8 8 (L) 09/04/2022 05:13 AM    HCT 27 9 (L) 09/04/2022 05:13 AM    MCV 98 09/04/2022 05:13 AM     (H) 09/04/2022 05:13 AM     Temp Readings from Last 3 Encounters:   09/03/22 97 9 °F (36 6 °C) (Oral)   08/09/22 99 3 °F (37 4 °C) (Oral)   07/20/22 98 4 °F (36 9 °C) (Temporal)     Vancomycin Days of Therapy: 4    Assessment/Plan  The patient is currently on vancomycin utilizing scheduled dosing  Baseline risks associated with therapy include: pre-existing renal impairment and advanced age  The patient is receiving 1000mg IV q24h with the most recent vancomycin level being at steady-state and sub-therapeutic (10 4) based on a goal of 15-20 (appropriate for most indications) ; therefore, after clinical evaluation will be changed to 1250mg IV q24h   Pharmacy will continue to follow closely for s/sx of nephrotoxicity, infusion reactions, and appropriateness of therapy  BMP and CBC will be ordered per protocol  Plan for trough as patient approaches steady state, prior to the 4th  dose at approximately 0430 on 9/8  Pharmacy will continue to follow the patient’s culture results and clinical progress daily      Suad Whitney, Pharmacist

## 2022-09-04 NOTE — ASSESSMENT & PLAN NOTE
· Secondary to hospital induced delirium vs metabolic encephalopathy  · No focal deficit on exam  · MS improved today  · Delirium precautions

## 2022-09-04 NOTE — ASSESSMENT & PLAN NOTE
· Recent hx of staph epi bacteremia in 7/2022, presumed due to picc line  · Now with possible recurrence 1/2 set staph epi, 2nd set no growth thus far  · ID on board  · On IV vancomycin  · Will need to have PICC removed  But will leave in place for now given poor IV access, ok per ID  · Pt will need TAVIA  However will place on hold for now given present ongoing discussion in regards to goals of care  Pt has indicated if positive she would not want to be on prolonged abx  S/p TTE in 7/2022  · Repeat blood cx neg  · GI consulted for alternate feeding, recommends to d/c TPN  · D/w pt by myself and GI does not wish to have feeding tube  And J tube not a consideration as planned for redo of [place] en y  Palliative on board in addressing goals of care  · D/w Pt and her family and are in agreement with discontinuation of TPN

## 2022-09-04 NOTE — PLAN OF CARE
Problem: Potential for Falls  Goal: Patient will remain free of falls  Description: INTERVENTIONS:  - Educate patient/family on patient safety including physical limitations  - Instruct patient to call for assistance with activity   - Consult OT/PT to assist with strengthening/mobility   - Keep Call bell within reach  - Keep bed low and locked with side rails adjusted as appropriate  - Keep care items and personal belongings within reach  - Initiate and maintain comfort rounds  - Make Fall Risk Sign visible to staff  - Offer Toileting every Hours, in advance of need  - Initiate/Maintain alarm  - Obtain necessary fall risk management equipment:   - Apply yellow socks and bracelet for high fall risk patients  - Consider moving patient to room near nurses station  Outcome: Progressing     Problem: SAFETY ADULT  Goal: Patient will remain free of falls  Description: INTERVENTIONS:  - Educate patient/family on patient safety including physical limitations  - Instruct patient to call for assistance with activity   - Consult OT/PT to assist with strengthening/mobility   - Keep Call bell within reach  - Keep bed low and locked with side rails adjusted as appropriate  - Keep care items and personal belongings within reach  - Initiate and maintain comfort rounds  - Make Fall Risk Sign visible to staff  - Offer Toileting every  Hours, in advance of need  - Initiate/Maintain alarm  - Obtain necessary fall risk management equipment:   - Apply yellow socks and bracelet for high fall risk patients  - Consider moving patient to room near nurses station  Outcome: Progressing

## 2022-09-04 NOTE — ASSESSMENT & PLAN NOTE
· POA; Met SIRs criteria with fever, leukocytosis   · Source with bacteremia, possible C diff colitis  · Infectious disease following  · + evidence of colitis on ct scan  Status post C diff panel; positive toxin PCR but negative EIA  On p o  Vancomycin  · Positive recurrent bacteremia  Presumed secondary to PICC line with use of TPN  Prior history of recent MSSE  · Repeat blood cx neg thus far  · On IV vancomycin  · Ur cx polymicrobial but non significant colony ct   CTX discontinued   · Trend wbc

## 2022-09-04 NOTE — ASSESSMENT & PLAN NOTE
Malnutrition Findings:   Adult Malnutrition type: Chronic illness  Adult Degree of Malnutrition: Malnutrition of moderate degree  Malnutrition Characteristics: Fat loss, Muscle loss                  360 Statement: Moderate malnutrition in setting of chronic illness related to ongoing GI issues/malabsorption as evidenced by severely sunken orbital lobes, sunken temporal lobes, severely protruding clavicular bone    BMI Findings: Body mass index is 21 46 kg/m²     Hx of Savage-en Y gastric bypass, 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  Pt refusing NGT or PEG  Nutrition following

## 2022-09-04 NOTE — ASSESSMENT & PLAN NOTE
· Patient with history of gastric bypass surgery  Has had significant malnutrition issues and anastomotic ulcerations  Seen by Bariatric Service during recent hospitalization at [facility] who recommended prolonged course of TPN for nutritional optimization and possible revision of surgery in the future  · Was planned to have appt with bariatric sx on 8/20 however had to be rescheduled  · Given recurrent bacteremia; s/p GI consult have recommended discontinuation of TPN  However pt refusing for placement of PEG or NGT  J tube not a consideration given plans for reversal of [place]-en-y in the future  · Pt leaning towards just having po feeds  · Nutrition on board, caloric intake monitoring   Liberize diet, nutrition supplements   · Glucose monitoring, hypoglycemic protocol in place

## 2022-09-05 NOTE — PROGRESS NOTES
Progress Note - Infectious Disease   Spring Iverson 76 y o  female MRN: [id number]  Unit/Bed#: [facility] 317-01 Encounter: [id number]      Impression/Plan:  1  Sepsis, POA  Patient noted with brisk leukocytosis along with fever  Multiple sources at this time including 2, 3 and 6 likely contributing  Initially improved but white count up trending again  Patient's overall clinical status appears to be poor and she remains malnourished  Will continue IV and oral antibiotic as below for now  Continue to trend fever curve/vitals  Repeat CBC/chemistry tomorrow  Will maintain current PICC line for now  Additional imaging on hold for now  Goals of care discussions as per primary/palliative care  Additional supportive care/discharge as per primary  Duration of therapy/interventions pending clinical course    2  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  One of 2 cultures have now returned positive  Orlando Farrell had previous episode of line-related bacteremia  Ultimately difficult to exclude recurrence in the setting of 7  PICC line remains in place, difficult access  Repeat cultures NGTD  2D echo unremarkable      Will continue on IV vancomycin for now  Pharmacy consult for vancomycin monitoring  Continue oral vancomycin as below  Will hold off on TAVIA for now pending goals of care discussion  Duration of therapy and PICC line replacement pending clinical course    3  Colitis on CT images and diarrhea   Patient having ongoing diarrhea which may be related to 6  Ultimately cannot rule out C diff either  PCR positive     Continue oral vancomycin 125 q 6  Monitor abdominal exam  Continue to trend fever curve/WBC      4  Abnormal UA  Patient without symptoms  Asymptomatic bacteriuria  No antibiotics continued for this issue  5  Transaminitis  Emily Aguillon suspect that this is related to volume losses   Continue to trend for now  6  Acute anemia and prior GI bleeding   Patient's hemoglobin had acutely decreased     She has had prior bleeding marginal ulcers which has contributed to recurrent admissions chronic TPN use  Also present on EGD on   Other forms of nutrition were discussed with the patient and she did not wish to have feeding tube  Antibiotics as above  Ongoing follow-up by GI  Goals of care discussions ongoing        7  Chronic TPN use  Has been on since admission in  to optimize her nutrition   Unfortunately now her course has been complicated by bacteremia requiring PICC line replacement   Ongoing goals of care discussions  Above plan discussed in detail with the patient and with primary service  ID consult service will continue to follow       Antibiotics:  Vancomycin IV 6  Oral vancomycin 7    Subjective:  Patient currently denies having any nausea vomiting, chest pain  She seems to be happy with being able to eat by mouth  Denies any overt abdominal pain  Discussed weekend events with primary service  Objective:  Vitals:  Temp:  [97 7 °F (36 5 °C)-98 5 °F (36 9 °C)] 98 5 °F (36 9 °C)  HR:  [76-85] 76  Resp:  [12-20] 20  BP: (111-123)/(57-71) 123/71  SpO2:  [93 %-94 %] 93 %  Temp (24hrs), Av 2 °F (36 8 °C), Min:97 7 °F (36 5 °C), Max:98 5 °F (36 9 °C)  Current: Temperature: 98 5 °F (36 9 °C)    Physical Exam:   General Appearance:  Alert, interactive, nontoxic, no acute distress  Chronically ill-appearing and frail  Throat: Oropharynx moist without lesions  Lungs:   Clear to auscultation bilaterally; no wheezes, rhonchi or rales; respirations unlabored on room air   Heart:  RRR; no murmur, rub or gallop appreciated   Abdomen:   Soft, non-tender, non-distended, positive bowel sounds  Rectal tube in place still draining out black stools   Extremities: No clubbing, cyanosis or edema   Skin: No new rashes or lesions  No new draining wounds noted         Labs, Imaging, & Other studies:   All pertinent labs and imaging studies were personally reviewed  Results from last 7 days Lab Units 09/05/22  0528 09/04/22  0513 09/03/22  0518   WBC Thousand/uL 14 00* 11 25* 8 95   HEMOGLOBIN g/dL 8 1* 8 8* 7 7*   PLATELETS Thousands/uL 502* 495* 483*     Results from last 7 days   Lab Units 09/05/22  0528 09/04/22  0513 09/03/22  0518 09/02/22  1042 09/01/22  0618   POTASSIUM mmol/L 3 8 3 9 3 5   < > 3 6   CHLORIDE mmol/L 107 108 112*   < > 112*   CO2 mmol/L 24 23 23   < > 21   BUN mg/dL 19 20 22   < > 36*   CREATININE mg/dL 1 23 1 12 1 14   < > 1 19   EGFR ml/min/1 73sq m 41 46 45   < > 43   CALCIUM mg/dL 8 9 8 9 8 7   < > 8 2*   AST U/L  --  44 41  --  53*   ALT U/L  --  73 80*  --  115*   ALK PHOS U/L  --  141* 130*  --  149*    < > = values in this interval not displayed  Results from last 7 days   Lab Units 09/02/22  1044 08/31/22  0635 08/31/22  0501 08/31/22  0206   BLOOD CULTURE  No Growth at 72 hrs  No Growth at 72 hrs   --   --  Staphylococcus epidermidis*  Staphylococcus pettenkoferi*  No Growth After 5 Days     GRAM STAIN RESULT   --   --   --  Gram positive cocci in clusters*   URINE CULTURE   --   --  80,000-89,000 cfu/ml - 2 strains Gram Negative Jerome Enteric Like*  40,000-49,000 cfu/ml Proteus species*  40,000-49,000 cfu/ml Enterococcus species*  --    C DIFF TOXIN B BY PCR   --  Positive*  --   --

## 2022-09-05 NOTE — ASSESSMENT & PLAN NOTE
· Patient with history of gastric bypass surgery  Has had significant malnutrition issues and anastomotic ulcerations  Seen by Bariatric Service during recent hospitalization at [facility] who recommended prolonged course of TPN for nutritional optimization and possible revision of surgery in the future  · Was planned to have appt with bariatric sx on 8/20 however had to be rescheduled  · Given recurrent bacteremia; s/p GI consult have recommended discontinuation of TPN  However pt refusing for placement of PEG or NGT  J tube not a consideration given plans for reversal of [place]-en-y in the future  · Pt leaning towards just having po feeds  · Nutrition on board, caloric intake monitoring   Liberize diet, nutrition supplements   · Glucose monitoring, hypoglycemic protocol in place

## 2022-09-05 NOTE — NUTRITION
09/05/22 1400   Recommendations/Interventions   Summary Calorie Count reviewed, only data from 2 meals (over past 2 days) available  Pt with minimal intake, estimated to be meeting <50% of nutrition needs  Nutrition supplements ordered but pt doesn't seem to be accepting  Diet previously liberalized  Weight down 6# (4 8%) x < 1 week  Noted plan for family meeting  To meet nutrition needs, pt would likely require nutrition support   RD to follow for results of goals of care discussion, consult if nutrition support recs desired

## 2022-09-05 NOTE — ASSESSMENT & PLAN NOTE
Malnutrition Findings:   Adult Malnutrition type: Chronic illness  Adult Degree of Malnutrition: Other severe protein calorie malnutrition  Malnutrition Characteristics: Weight loss, Muscle loss                  360 Statement: Severe/Chronic malnutrition r/t condition, as evidenced by 4 8% wt loss x < 1 week (9/2/22: 125#, 9/5/22: 119#), and severe muscle mass depletion (temples/clavicle)  Treated with liberalized diet and nutrition supplements, possibly nutrition support pending goals of care    BMI Findings: Body mass index is 20 55 kg/m²     Hx of Savage-en Y gastric bypass, 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  Pt refusing NGT or PEG  Nutrition following

## 2022-09-05 NOTE — PROGRESS NOTES
Vancomycin IV Pharmacy-to-Dose Consultation    Rico Wilder is a 76 y o  female who is currently receiving vancomycin IV with management by the Pharmacy Consult service  Assessment/Plan:    The patient's chart was reviewed  Renal function is stable  There are no signs or symptoms of nephrotoxicity and/or infusion reactions documented  Based on today's assessment, will continue current vancomycin (Day # 5) dosing of 1250 mg IV q24h, with a plan for trough to be drawn 9/8 at approximately 0430  We will continue to follow the patient's culture results and clinical progress daily      Thank you,  Na Yang, PharmD, [facility] Pharmacist  [phone]

## 2022-09-05 NOTE — ASSESSMENT & PLAN NOTE
· Palliative on board  Awaiting for arrival of pt's siblings  Planned for family mtg for tomorrow since all siblings are in town   She did express that she did not wish to be stuck in a hospital for the rest of her life and understands that she isn't getting better  · She remains full code for now

## 2022-09-05 NOTE — PROGRESS NOTES
[facility]  Progress Note - Bora Ramsay 1945, 76 y o  female MRN: [id number]  Unit/Bed#: [facility] 317-01 Encounter: [id number]  Primary Care Provider: Radha Marsh MD   Date and time admitted to hospital: 8/30/2022 11:49 PM    * Sepsis [facility])  Assessment & Plan  · POA; Met SIRs criteria with fever, leukocytosis   · Source with bacteremia, possible C diff colitis  · Infectious disease following  · + evidence of colitis on ct scan  Status post C diff panel; positive toxin PCR but negative EIA  On p o  Vancomycin  · Positive recurrent bacteremia  Presumed secondary to PICC line with use of TPN  Prior history of recent MSSE  · Repeat blood cx neg thus far  Worsening leukocytosis  · On IV vancomycin; dosing per pharmacy  · Ur cx polymicrobial but non significant colony ct  CTX discontinued   · Trend wbc      Bacteremia  Assessment & Plan  · Recent hx of staph epi bacteremia in 7/2022, presumed due to picc line  · Now with possible recurrence 1/2 set staph epi, 2nd set no growth thus far  · ID on board  · On IV vancomycin  · Will need to have PICC removed  But will leave in place for now given poor IV access, ok per ID  · Pt will need TAVIA  However will place on hold for now given present ongoing discussion in regards to goals of care  Pt has indicated if positive she would not want to be on prolonged abx  S/p TTE in 7/2022  · Repeat blood cx neg  · GI consulted for alternate feeding, recommends to d/c TPN  · D/w pt by myself and GI does not wish to have feeding tube  And J tube not a consideration as planned for redo of [place] en y  Palliative on board in addressing goals of care  · D/w Pt and her family and are in agreement with discontinuation of TPN  H/O bariatric surgery - bypass  Assessment & Plan  · Patient with history of gastric bypass surgery  Has had significant malnutrition issues and anastomotic ulcerations    Seen by Bariatric Service during recent hospitalization at [facility] who recommended prolonged course of TPN for nutritional optimization and possible revision of surgery in the future  · Was planned to have appt with bariatric sx on 8/20 however had to be rescheduled  · Given recurrent bacteremia; s/p GI consult have recommended discontinuation of TPN  However pt refusing for placement of PEG or NGT  J tube not a consideration given plans for reversal of savage-en-y in the future  · Pt leaning towards just having po feeds  · Nutrition on board, caloric intake monitoring  Liberize diet, nutrition supplements   · Glucose monitoring, hypoglycemic protocol in place    Acute on chronic anemia  Assessment & Plan  · GI on board  · 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  · S/p recent EGD in 7/22 revealing: Residual marginal ulcer of the gastrojejunostomy anastomosis with improved size  · S/p EGD 9/2 revealing: Large, cratered ulcer in the gastrojejunal anastomosis   · Received PRBC transfusion x 1   · Resumed back on diet  · Cont PPI BID, carafate  · H/H stable    Moderate protein-calorie malnutrition ([address] )  Assessment & Plan  Malnutrition Findings:   Adult Malnutrition type: Chronic illness  Adult Degree of Malnutrition: Other severe protein calorie malnutrition  Malnutrition Characteristics: Weight loss, Muscle loss                  360 Statement: Severe/Chronic malnutrition r/t condition, as evidenced by 4 8% wt loss x < 1 week (9/2/22: 125#, 9/5/22: 119#), and severe muscle mass depletion (temples/clavicle)  Treated with liberalized diet and nutrition supplements, possibly nutrition support pending goals of care    BMI Findings: Body mass index is 20 55 kg/m²  Hx of Savage-en Y gastric bypass, 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  Pt refusing NGT or PEG  Nutrition following    Goals of care, counseling/discussion  Assessment & Plan  · Palliative on board  Awaiting for arrival of pt's siblings   Planned for family mtg for tomorrow since all siblings are in town  She did express that she did not wish to be stuck in a hospital for the rest of her life and understands that she isn't getting better  · She remains full code for now      Acute encephalopathy  Assessment & Plan  · Secondary to hospital induced delirium vs metabolic encephalopathy  · No focal deficit on exam  · MS improved today  · Delirium precautions    Ambulatory dysfunction  Assessment & Plan  · Exacerbated by diarrhea/dehydration however patient is chronically malnourished  · S/p PT/OT evaluation; to return to SNF      VTE Pharmacologic Prophylaxis:   Pharmacologic: Pharmacologic VTE Prophylaxis contraindicated due to Anemia  Mechanical VTE Prophylaxis in Place: Yes    Patient Centered Rounds: I have performed bedside rounds with nursing staff today  Discussions with Specialists or Other Care Team Provider:     Education and Discussions with Family / Patient: Patient and her brother    Time Spent for Care: 30 minutes  More than 50% of total time spent on counseling and coordination of care as described above  Current Length of Stay: 5 day(s)    Current Patient Status: Inpatient   Certification Statement: The patient will continue to require additional inpatient hospital stay due to Acute illness    Discharge Plan:     Code Status: Level 1 - Full Code      Subjective:   Brother at bedside  Thinks she may be leaning towards hospice as she states she does not want to be in the hospital for the rest of her life  But not completely made up her mind  She understands that it is unlikely she will get better  MS improved    Objective:     Vitals:   Temp (24hrs), Av 2 °F (36 8 °C), Min:97 7 °F (36 5 °C), Max:98 5 °F (36 9 °C)    Temp:  [97 7 °F (36 5 °C)-98 5 °F (36 9 °C)] 98 5 °F (36 9 °C)  HR:  [76-85] 76  Resp:  [12-20] 20  BP: (111-123)/(57-71) 123/71  SpO2:  [93 %-94 %] 93 %  Body mass index is 20 55 kg/m²  Input and Output Summary (last 24 hours):        Intake/Output Summary (Last 24 hours) at 9/5/2022 1839  Last data filed at 9/5/2022 6277  Gross per 24 hour   Intake 180 ml   Output 900 ml   Net -720 ml       Physical Exam:     Physical Exam  Constitutional:       Comments: Cachectic   Cardiovascular:      Rate and Rhythm: Normal rate and regular rhythm  Pulses: Normal pulses  Heart sounds: Normal heart sounds  Pulmonary:      Effort: Pulmonary effort is normal  No respiratory distress  Breath sounds: Normal breath sounds  No wheezing or rales  Abdominal:      General: Abdomen is flat  Bowel sounds are normal  There is no distension  Palpations: Abdomen is soft  Tenderness: There is no abdominal tenderness  There is no guarding  Musculoskeletal:         General: Normal range of motion  Cervical back: Normal range of motion and neck supple  Right lower leg: No edema  Left lower leg: No edema  Skin:     General: Skin is warm and dry  Neurological:      General: No focal deficit present  Mental Status: She is alert and oriented to person, place, and time  Mental status is at baseline  Cranial Nerves: No cranial nerve deficit  Additional Data:     Labs:    Results from last 7 days   Lab Units 09/05/22  0528 09/01/22  1203 09/01/22  0618   WBC Thousand/uL 14 00*   < > 14 01*   HEMOGLOBIN g/dL 8 1*   < > 6 4*   HEMATOCRIT % 26 8*   < > 20 9*   PLATELETS Thousands/uL 502*   < > 481*   NEUTROS PCT %  --   --  67   LYMPHS PCT %  --   --  13*   MONOS PCT %  --   --  7   EOS PCT %  --   --  11*    < > = values in this interval not displayed       Results from last 7 days   Lab Units 09/05/22  0528 09/04/22  0513   SODIUM mmol/L 137 137   POTASSIUM mmol/L 3 8 3 9   CHLORIDE mmol/L 107 108   CO2 mmol/L 24 23   BUN mg/dL 19 20   CREATININE mg/dL 1 23 1 12   ANION GAP mmol/L 6 6   CALCIUM mg/dL 8 9 8 9   ALBUMIN g/dL  --  1 6*   TOTAL BILIRUBIN mg/dL  --  0 28   ALK PHOS U/L  --  141*   ALT U/L  --  73   AST U/L  --  44 GLUCOSE RANDOM mg/dL 81 83     Results from last 7 days   Lab Units 08/31/22  0212   INR  1 21*     Results from last 7 days   Lab Units 09/05/22  1631 09/05/22  0626 09/04/22  2046 09/04/22  1609 09/04/22  0613 09/04/22  0008 09/03/22  2121   POC GLUCOSE mg/dl 92 91 112 88 121 88 91         Results from last 7 days   Lab Units 09/02/22  1042 08/31/22  0415 08/31/22  0212 08/31/22  0206   LACTIC ACID mmol/L  --  0 9 1 0  --    PROCALCITONIN ng/ml 0 40*  --   --  0 54*           * I Have Reviewed All Lab Data Listed Above  * Additional Pertinent Lab Tests Reviewed: All Labs Within Last 24 Hours Reviewed    Imaging:    Imaging Reports Reviewed Today Include:   Imaging Personally Reviewed by Myself Includes:      Recent Cultures (last 7 days):     Results from last 7 days   Lab Units 09/02/22  1044 08/31/22  0635 08/31/22  0501 08/31/22  0206   BLOOD CULTURE  No Growth at 72 hrs  No Growth at 72 hrs   --   --  Staphylococcus epidermidis*  Staphylococcus pettenkoferi*  No Growth After 5 Days     GRAM STAIN RESULT   --   --   --  Gram positive cocci in clusters*   URINE CULTURE   --   --  80,000-89,000 cfu/ml - 2 strains Gram Negative Jerome Enteric Like*  40,000-49,000 cfu/ml Proteus species*  40,000-49,000 cfu/ml Enterococcus species*  --    C DIFF TOXIN B BY PCR   --  Positive*  --   --        Last 24 Hours Medication List:   Current Facility-Administered Medications   Medication Dose Route Frequency Provider Last Rate   • acetaminophen  650 mg Oral Q6H PRN Cb Lopez MD     • ALPRAZolam  0 25 mg Oral TID PRN Uriel Floyd DO     • alteplase  2 mg Intracatheter Once [employer], PA-C     • clonazePAM  3 mg Oral HS Cb Lopez MD     • folic acid  1 mg Oral Daily Cb Lopez MD     • levothyroxine  112 mcg Oral Early Morning Cb Lopez MD     • midodrine  5 mg Oral TID AC Car Galloway MD     • ondansetron  4 mg Intravenous Q6H PRN Car Galloway MD     • pantoprazole  40 mg Intravenous Q12H [facility] Miriam Arroyo DO     • sucralfate  1 g Oral 4x Daily (AC & HS) Lara Nichols DO     • thiamine  100 mg Oral Daily Aj Andrews MD     • traZODone  150 mg Oral HS Aj Andrews MD     • vancomycin  1,250 mg Intravenous Q24H Senthil Watt MD 1,250 mg (09/05/22 0531)   • vancomycin  125 mg Oral Q6H Edilma Slade MD          Today, Patient Was Seen By: Lara Nichols DO    ** Please Note: Dictation voice to text software may have been used in the creation of this document   **

## 2022-09-05 NOTE — ASSESSMENT & PLAN NOTE
· POA; Met SIRs criteria with fever, leukocytosis   · Source with bacteremia, possible C diff colitis  · Infectious disease following  · + evidence of colitis on ct scan  Status post C diff panel; positive toxin PCR but negative EIA  On p o  Vancomycin  · Positive recurrent bacteremia  Presumed secondary to PICC line with use of TPN  Prior history of recent MSSE  · Repeat blood cx neg thus far  Worsening leukocytosis  · On IV vancomycin; dosing per pharmacy  · Ur cx polymicrobial but non significant colony ct   CTX discontinued   · Trend wbc

## 2022-09-05 NOTE — MALNUTRITION/BMI
This medical record reflects one or more clinical indicators suggestive of malnutrition  Malnutrition Findings:   Adult Malnutrition type: Chronic illness  Adult Degree of Malnutrition: Other severe protein calorie malnutrition  Malnutrition Characteristics: Weight loss, Muscle loss                  360 Statement: Severe/Chronic malnutrition r/t condition, as evidenced by 4 8% wt loss x < 1 week (9/2/22: 125#, 9/5/22: 119#), and severe muscle mass depletion (temples/clavicle)  Treated with liberalized diet and nutrition supplements, possibly nutrition support pending goals of care       Body mass index is 20 55 kg/m²  See Nutrition note dated 9/5/22 for additional details  Completed nutrition assessment is viewable in the nutrition documentation

## 2022-09-05 NOTE — PLAN OF CARE
Problem: INFECTION - ADULT  Goal: Absence or prevention of progression during hospitalization  Description: INTERVENTIONS:  - Assess and monitor for signs and symptoms of infection  - Monitor lab/diagnostic results  - Monitor all insertion sites, i e  indwelling lines, tubes, and drains  - Monitor endotracheal if appropriate and nasal secretions for changes in amount and color  - [place] appropriate cooling/warming therapies per order  - Administer medications as ordered  - Instruct and encourage patient and family to use good hand hygiene technique  - Identify and instruct in appropriate isolation precautions for identified infection/condition  Outcome: Progressing  Goal: Absence of fever/infection during neutropenic period  Description: INTERVENTIONS:  - Monitor WBC    Outcome: Progressing

## 2022-09-06 NOTE — OCCUPATIONAL THERAPY NOTE
Occupational Therapy Cancel Note        Patient Name: Tacho Cuello  ZYNTM'[id number] Date: 9/6/2022 09/06/22 1341   OT Last Visit   OT Visit Date 09/06/22   Note Type   Note Type Treatment   Cancel Reasons Other     Attempted to see pt this afternoon for OT treatment however upon arrival, pt declining @ this time reporting "Oh no I feel awful " OT offered to downgrade functional tasks however pt continuing to politely decline @ this time  Will continue to follow on caseload and see when able      Alma Landaverde MS, OTR/L

## 2022-09-06 NOTE — CASE MANAGEMENT
Case Management Discharge Planning Note    Patient name Agapito Pennington  Location [facility] 739-16 MRN [id number]  : 1945 Date 2022       Current Admission Date: 2022  Current Admission Diagnosis:Sepsis [facility])   Patient Active Problem List    Diagnosis Date Noted   • Acute encephalopathy 2022   • Moderate protein-calorie malnutrition ([facility] [address] ) 2022   • Bacteremia 2022   • Acute on chronic anemia 2022   • Goals of care, counseling/discussion 2022   • Colitis presumed to be due to infection 2022   • Acute cystitis without hematuria 2022   • Acute kidney insufficiency 2022   • Fall 2022   • Diarrhea 2022   • Sepsis ([facility] [address] ) 2022   • Sacral ulcer ([facility] [address] ) 2022   • Gram-positive bacteremia 2022   • Severe protein-calorie malnutrition ([facility] [address] ) 2022   • Bilateral leg edema 2022   • Ambulatory dysfunction 2022   • Acute blood loss anemia 2022   • Gastric ulcer 2022   • Fever 2022   • Anemia 2022   • Dyspnea on exertion 2022   • Generalized weakness 2022   • Elevated LFTs 2022   • Hyponatremia 2022   • Abnormal CT scan 2022   • H/O bariatric surgery - bypass 2022   • Cerumen debris on tympanic membrane of right ear 2022   • Nasal congestion 2022   • Bilateral hearing loss 2022   • Fibromyalgia syndrome 2021   • Osteopenia of both forearms 2021   • Medicare annual wellness visit, subsequent 2021   • Shortness of breath 2021   • Acute bronchitis 2021   • COVID-19 2021   • Dysphasia 2020   • Epigastric pain 2020   • Hypothyroidism 2020   • Gastroesophageal reflux disease without esophagitis 2020   • Depression 2020   • Fibromyalgia, primary 2020   • Iron deficiency 2020   • Numbness of foot 2020      LOS (days): 6  Geometric Mean LOS (GMLOS) (days): 4 80  [facility]     OBJECTIVE:  Risk of Unplanned Readmission Score: 26 01         Current admission status: Inpatient   Preferred Pharmacy:   [facility] [address] [facility], [address] [place] [address] [facility] [address]  Phone: [phone] Fax: [phone]    [address] [facility] [address] [address]  Phone: [phone] Fax: [phone]    Primary Care Provider: Liza Lu MD    Primary Insurance: [facility]  Secondary Insurance:     DISCHARGE DETAILS:    Discharge planning discussed with[de-identified] Elizabeth Brooks [facility]), April [facility])  Freedom of Choice: Yes  Comments - Freedom of Choice: April from Palliative contacted CM and exlpained the famly needed a list of SNF that accepted hospice for the patinet to d/c to  CM then made a TC to Andi Mike  family wants to look for a new LTC facility  CM emailed a financial applicaiton to Andi Mckeon and requested it be filled out  Brittanie Manifold later Contacted CM and explaine dthe patient wants evaluated for a Peg tube   CM to follow  CM contacted family/caregiver?: Yes  Were Treatment Team discharge recommendations reviewed with patient/caregiver?: Yes  Did patient/caregiver verbalize understanding of patient care needs?: N/A- going to facility  Were patient/caregiver advised of the risks associated with not following Treatment Team discharge recommendations?: Yes    Contacts  Patient Contacts: Roann Chick  Relationship to Patient[de-identified] Family  Contact Method: Phone  Phone Number: [phone]  Reason/Outcome: Continuity of Care, Emergency Contact, Discharge Planning                           Discharge Destination Plan[de-identified] SNF, Hospice

## 2022-09-06 NOTE — ASSESSMENT & PLAN NOTE
· Secondary to hospital induced delirium vs metabolic encephalopathy  · No focal deficit on exam  · Delirium precautions

## 2022-09-06 NOTE — PROGRESS NOTES
Progress Note - Infectious Disease   Adelfo Cardoso 76 y o  female MRN: [id number]  Unit/Bed#: [facility] 317-01 Encounter: [id number]      Impression/Plan:  1  Sepsis  Pt presenting with significant leukocytosis to 31 and fever to 101 4  She was recently admitted 1 month ago for PICC line infection with Staph epidermidis bacteremia, treated with IV cefazolin  She has long-standing PICC requirement due to #8  Source for sepsis likely #2, also consider #3 vs #4  Pt now afebrile and hemodynamically stable, continues to be lethargic and altered  WBC initially trended down but rising last few days, 13 9 today  1/2 BCx now growing staph epidermidis, given recent h/o PICC line bacteremia with coag-neg staph will treat this as true bacteremia  · Antibiotics as below  · Continue to monitor fever curve, vitals, and WBC      2  Coagulase-negative staph bacteremia  1/2 BCx with staph epidermidis  H/o same during last admission about 1 month ago 2/2 PICC line in place due to #8  PICC line remains in place as pt has no other venous access at this time  Repeat BCx from 9/2 now no growth x72 hr  TTE with no evidence of vegetations  · Continue IV vancomycin    · Continue to follow repeat BCx   · Remove PICC line when able to obtain other venous access  · Reassess need for TPN use and consider holding TPN for now  · Family meeting today, f/u goals of care  · Continue to monitor fever curve, vitals, and WBC      3  Colitis  Presented with 2 days of diarrhea  During last admission 1 month ago pt was found to have positive C diff PCR but negative EIA, indicating colonization rather than infection  She was treated with prophylactic PO vancomycin at that time  CT A/P this admission revealing diffuse colonic thickening suggestive of colitis   C diff this admission again with positive PCR but negative EIA, indicating colonization rather than acute infection  Pt now has rectal tube in place with liquid, dark brown stool in bag, not overtly bloody or melanotic  Diarrhea may be 2/2 acute GI blood loss, as in #5  EGD 9/2 with ulcer at [address] anastomosis but no signs of current or recent bleeding  Hb stable last few days around 8    · Continue PO vancomycin 125mg q6h given degree of worsening diarrhea, appearance of colitis on imaging, and clinical sepsis  · Consider workup for other causes of infectious and non-infectous colitis as C diff is not the clear cause  · GI consulted, appreciate recommendations      4  Cystitis vs asymptomatic bacturia  CT A/P also showing bladder wall thickening suggestive of cystitis  UA with innumerable RBCs, WBCs, and bacteria  Pt does endorse dysuria PTA  UCx with 80k cfu enteric-like GNRs, 40k proteus, and 40k enterococcus  Pt no longer having dysuria  S/p cefepime x1 and ceftriaxone x1  · Will not treat as UTI as symptoms have resolved and cfu <100k, likely indicating asymptomatic bacturia   · Continue to monitor fever curve, vitals, and WBC      5  Anemia  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  Pt presented to [facility] earlier this year with upper GI bleeding, found to have marginal ulcer bleeding on endoscopy  Pt with drop in Hb on 9/1 from 8 2 to 6 1, transfused 1U PRBCs  Stool does not appear overtly bloody or melanotic  EGD as in #3  Hb now stable       6  NOAH  Cr on presentation 1 35, likely pre-renal etiology in the setting of diarrhea and dehydration  Initially improved after IVF resuscitation  Now trending up last few days, Cr 1 32 today  Avoid nephrotoxic agents and hypotension  Continue to monitor  Will dose adjust antibiotics as indicated  Pharmacy following for vancomycin dosing       7  Elevated LFT's  No h/o liver disease  Labs from earlier this year show occasional elevation in ALP but AST and ALT mostly wnl  CMP on arrival: , , , albumin 1 7, INR 1 21  CT A/P also showing cholelithiasis with no inflammatory changes   Pt does have h/o RUQ pain and gallbladder inflammation, per chart review  Prior LFT abnormalities have been more cholestatic pattern compared to this more hepatocellular/ mixed pattern  Hepatitis panel negative  Other etiology could include shock liver in the setting of sepsis   LFTs downtrending after IVF resuscitation  Continue to monitor  GI consulted      8  TPN use  Started on TPN to maintain nutrition in the setting of bleeding marginal ulcers, as in #5, until bariatric surgery follow up, which was supposed to occur last week but it appears that pt cancelled appointment  PICC in place on arrival with no signs of infection  Per discussion with GI, pt does not want NG or PEJ tubes  Antibiotics:  IV vancomycin #7  PO vancomycin #8    Subjective:  Patient lethargic during exam today  She wakes up briefly and denies complaints  Objective:  Vitals:  Temp:  [97 7 °F (36 5 °C)-99 5 °F (37 5 °C)] 98 2 °F (36 8 °C)  HR:  [76-87] 87  Resp:  [12-20] 18  BP: (111-123)/(57-71) 111/62  SpO2:  [91 %-95 %] 95 %  Temp (24hrs), Av 5 °F (36 9 °C), Min:97 7 °F (36 5 °C), Max:99 5 °F (37 5 °C)  Current: Temperature: 98 2 °F (36 8 °C)    Physical Exam:   General Appearance:  Alert, interactive, nontoxic, no acute distress  Throat: Oropharynx moist without lesions  Lungs:   Clear to auscultation bilaterally; no wheezes, rhonchi or rales; respirations unlabored   Heart:  RRR; no murmur, rub or gallop   Abdomen:   Soft, non-tender, non-distended, positive bowel sounds  Extremities: No clubbing, cyanosis or edema   Skin: No new rashes or lesions  No draining wounds noted         Labs, Imaging, & Other studies:   All pertinent labs and imaging studies were personally reviewed  Results from last 7 days   Lab Units 22  04322  0522  0513   WBC Thousand/uL 13 91* 14 00* 11 25*   HEMOGLOBIN g/dL 8 7* 8 1* 8 8*   PLATELETS Thousands/uL 511* 502* 495*     Results from last 7 days   Lab Units 22  04322  0528 22  8233 09/03/22  0518   SODIUM mmol/L 135 137 137 138   POTASSIUM mmol/L 4 0 3 8 3 9 3 5   CHLORIDE mmol/L 107 107 108 112*   CO2 mmol/L 23 24 23 23   BUN mg/dL 16 19 20 22   CREATININE mg/dL 1 32* 1 23 1 12 1 14   EGFR ml/min/1 73sq m 38 41 46 45   CALCIUM mg/dL 8 8 8 9 8 9 8 7   AST U/L 50*  --  44 41   ALT U/L 66  --  73 80*   ALK PHOS U/L 148*  --  141* 130*     Results from last 7 days   Lab Units 09/02/22  1044 08/31/22  0635 08/31/22  0501 08/31/22  0206   BLOOD CULTURE  No Growth at 72 hrs  No Growth at 72 hrs   --   --  Staphylococcus epidermidis*  Staphylococcus pettenkoferi*  No Growth After 5 Days     GRAM STAIN RESULT   --   --   --  Gram positive cocci in clusters*   URINE CULTURE   --   --  80,000-89,000 cfu/ml - 2 strains Gram Negative Jerome Enteric Like*  40,000-49,000 cfu/ml Proteus species*  40,000-49,000 cfu/ml Enterococcus species*  --    C DIFF TOXIN B BY PCR   --  Positive*  --   --      Results from last 7 days   Lab Units 09/02/22  1042 08/31/22  0206   PROCALCITONIN ng/ml 0 40* 0 54*

## 2022-09-06 NOTE — ASSESSMENT & PLAN NOTE
· Patient with history of gastric bypass surgery  Has had significant malnutrition issues and anastomotic ulcerations  Seen by Bariatric Service during recent hospitalization at [facility] who recommended prolonged course of TPN for nutritional optimization and possible revision of surgery in the future  · Was planned to have appt with bariatric sx on 8/20 however had to be rescheduled  · Given recurrent bacteremia; s/p GI consult have recommended discontinuation of TPN  Initially had refused now agreeable for placement of PEG  J tube not a consideration given plans for reversal of georgia-en-y in the future  · Notified GI  · Nutrition on board, caloric intake monitoring   Liberize diet, nutrition supplements   · Glucose monitoring, hypoglycemic protocol in place

## 2022-09-06 NOTE — PROGRESS NOTES
Vancomycin IV Pharmacy-to-Dose Consultation    Shelia Leo is a 76 y o  female who is currently receiving Vancomycin IV with management by the Pharmacy Consult service  Assessment/Plan:  The patient was reviewed  Renal function is stable, but did have a slight increase in Scr from 1 23 to 1 32  Will continue to keep a close eye on renal function  No other signs or symptoms of nephrotoxicity and/or infusion reactions were documented in the chart  Based on today’s assessment, continue current vancomycin (day # 6) dosing of 1250 mg, with a plan for trough to be drawn at 0430 on 9/8  We will continue to follow the patient’s culture results and clinical progress daily      Jodie Mota

## 2022-09-06 NOTE — PROGRESS NOTES
Progress Note - Infectious Disease   Rock Mann 76 y o  female MRN: [id number]  Unit/Bed#: [facility] 317-01 Encounter: [id number]      Impression/Plan:  1  Sepsis, POA  Patient noted with brisk leukocytosis along with fever  Multiple sources at this time including 2, 3 and 6 likely contributing  Initially improved but white count up trending again  Patient's overall clinical status appears to be poor and she remains malnourished  Will continue IV and oral antibiotic for now  Recommend additional imaging as below  Continue to trend fever curve/vitals  Repeat CBC/chemistry tomorrow  Recommend PICC line removal when possible  Ongoing goals of care discussions as per primary/palliative care  Additional supportive care as per primary  Duration of therapy pending clinical course     2  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  One of 2 cultures have now returned positive, 2 different sub species   Patient had previous episode of line-related bacteremia  Ultimately difficult to exclude recurrence in the setting of 7  PICC line remains in place, difficult access   Repeat cultures NGTD  2D echo unremarkable      Will continue on IV vancomycin for now  Pharmacy consult for vancomycin monitoring  Continue oral vancomycin as below  Recommend transesophageal echo with rising white count/recurrent  Recommend PICC line removal when possible  If workup negative will plan for 2 weeks of therapy  Re-evaluation by GI for entral feeding options  Goals of care discussions ongoing     3  Colitis on CT images and diarrhea   Patient having ongoing diarrhea which may be related to 6  Ultimately cannot rule out C diff either  PCR positive     Continue oral vancomycin 125 q 6  Monitor abdominal exam  Continue to trend fever curve/WBC  Tentative plan for 14 days of therapy      4  Abnormal UA  Patient without symptoms   Asymptomatic bacteriuria  No antibiotics continued for this issue      5  Transaminitis   Navid Crump suspect that this is related to volume losses   Continue to trend for now         6  Acute anemia and prior GI bleeding   Patient's hemoglobin had acutely decreased     She has had prior bleeding marginal ulcers which has contributed to recurrent admissions chronic TPN use   Also present on EGD on   goals of care discussions noted  Antibiotics as above  Recommend re-evaluation by GI  Ongoing follow-up by palliative        7  Chronic TPN use  Has been on since admission in  to optimize her nutrition   Unfortunately now her course has been complicated by bacteremia requiring PICC line replacement    Recommend re-evaluation by GI   Ongoing follow-up by palliative care     Above plan discussed earlier today with patient's family and patient at bedside  Above plan discussed later today with palliative care  ID consult service will continue to follow        Antibiotics:  Vancomycin IV 7  Oral vancomycin 8    Subjective:  Patient seen briefly at bedside this morning with her family present  She denied having any nausea, vomiting, chest pain  Overall very frail  Reviewed chart later today and patient had goals of care discussions  Family seems to be reasonable in overall moving in the direction of comfort focused/quality focus care  Patient however now seems to wish to pursue PEG tube  Ultimately uncertain if the patient truly understands the gravity of her illness  Objective:  Vitals:  Temp:  [98 1 °F (36 7 °C)-99 5 °F (37 5 °C)] 98 1 °F (36 7 °C)  HR:  [85-87] 85  Resp:  [18-20] 20  BP: (111-120)/(58-62) 120/60  SpO2:  [91 %-95 %] 94 %  Temp (24hrs), Av 6 °F (37 °C), Min:98 1 °F (36 7 °C), Max:99 5 °F (37 5 °C)  Current: Temperature: 98 1 °F (36 7 °C)    Physical Exam:   General Appearance:  Patient is awake, intermittently falling asleep, chronically ill-appearing and malnourished   Throat: Oropharynx moist without lesions      Lungs:   Clear to auscultation bilaterally; no wheezes, rhonchi or rales; respirations unlabored on room   Heart:  RRR; no murmur, rub or gallop appreciated   Abdomen:   Soft, non-tender, non-distended, positive bowel sounds  Stool output still noted to be black  Extremities: No clubbing, cyanosis or edema   Skin: No new rashes or lesions  No new draining wounds noted  PICC line appears to be pulled out further from the insertion site previously noted  No acute signs of infection at the site  Labs, Imaging, & Other studies:   All pertinent labs and imaging studies were personally reviewed  Results from last 7 days   Lab Units 09/06/22  0439 09/05/22 0528 09/04/22  0513   WBC Thousand/uL 13 91* 14 00* 11 25*   HEMOGLOBIN g/dL 8 7* 8 1* 8 8*   PLATELETS Thousands/uL 511* 502* 495*     Results from last 7 days   Lab Units 09/06/22  0439 09/05/22 0528 09/04/22  0513 09/03/22  0518   POTASSIUM mmol/L 4 0   < > 3 9 3 5   CHLORIDE mmol/L 107   < > 108 112*   CO2 mmol/L 23   < > 23 23   BUN mg/dL 16   < > 20 22   CREATININE mg/dL 1 32*   < > 1 12 1 14   EGFR ml/min/1 73sq m 38   < > 46 45   CALCIUM mg/dL 8 8   < > 8 9 8 7   AST U/L 50*  --  44 41   ALT U/L 66  --  73 80*   ALK PHOS U/L 148*  --  141* 130*    < > = values in this interval not displayed  Results from last 7 days   Lab Units 09/02/22  1044 08/31/22  0635 08/31/22  0501 08/31/22  0206   BLOOD CULTURE  No Growth After 4 Days  No Growth After 4 Days  --   --  Staphylococcus epidermidis*  Staphylococcus pettenkoferi*  No Growth After 5 Days     GRAM STAIN RESULT   --   --   --  Gram positive cocci in clusters*   URINE CULTURE   --   --  80,000-89,000 cfu/ml - 2 strains Gram Negative Jerome Enteric Like*  40,000-49,000 cfu/ml Proteus species*  40,000-49,000 cfu/ml Enterococcus species*  --    C DIFF TOXIN B BY PCR   --  Positive*  --   --

## 2022-09-06 NOTE — ASSESSMENT & PLAN NOTE
Malnutrition Findings:   Adult Malnutrition type: Chronic illness  Adult Degree of Malnutrition: Other severe protein calorie malnutrition  Malnutrition Characteristics: Weight loss, Muscle loss                  360 Statement: Severe/Chronic malnutrition r/t condition, as evidenced by 4 8% wt loss x < 1 week (9/2/22: 125#, 9/5/22: 119#), and severe muscle mass depletion (temples/clavicle)  Treated with liberalized diet and nutrition supplements, possibly nutrition support pending goals of care    BMI Findings: Body mass index is 20 55 kg/m²     Hx of Savage-en Y gastric bypass, 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  Pt agreeable to PEG  Nutrition following

## 2022-09-06 NOTE — SOCIAL WORK
A family meeting was necessary to allow for thorough discussion regarding patient's clinical presentation, expected disease trajectory, and comfort care versus continuing disease directed therapy  Please refer to [facility] provider note for medical specifics  Questions related to medical diagnoses, comfort care and prognosis were answered and all agree:     • The patient/family have jointly decided on evaluation for PEG placement and long-term IV antibiotics care  • Individuals present at meeting include: Patient, Yoly Ravi (sister), Briana Daly (LUCIA), Azar Alano (sister), Kayley Estes (LUCIA), Javy (NEVAEH), Tye (Brother), Arlington Smoker (sister), Abhinav Breaker (brother), Dr Guilherme Escobar, April [address]  [facility] ([address])  • Yuan Lund was held with patient's siblings and spouses regarding medical status and potential next steps  Siblings and spouses feel that the patient would not want aggressive/invassive interventions with the knowledge that she may not be a candidate for surgery even after the start of these interventions  • Bessie LUONG returned to patient's room to review medical options at this time  Patient states that she DOES want to be evaluated for PEG and IV antibiotics  She states, "I want a chance for improvement "  Patient is aware that these interventions do not guarantee surgery from biariatic    • I have spent 90 minutes with Patient and family today in which greater than 50% of this time was spent in counseling/coordination of care regarding coordination of care and support  • Palliative will continue to follow patient during evaluation of interventions

## 2022-09-06 NOTE — ASSESSMENT & PLAN NOTE
· Palliative on board  S/p family mtg patient has had a change of heart and wants to be txt focused and proceed with PEG placement   She states that she realized that she is not ready to die  · She remains full code

## 2022-09-06 NOTE — PLAN OF CARE
Problem: INFECTION - ADULT  Goal: Absence or prevention of progression during hospitalization  Description: INTERVENTIONS:  - Assess and monitor for signs and symptoms of infection  - Monitor lab/diagnostic results  - Monitor all insertion sites, i e  indwelling lines, tubes, and drains  - Monitor endotracheal if appropriate and nasal secretions for changes in amount and color  - [place] appropriate cooling/warming therapies per order  - Administer medications as ordered  - Instruct and encourage patient and family to use good hand hygiene technique  - Identify and instruct in appropriate isolation precautions for identified infection/condition  Outcome: Progressing  Goal: Absence of fever/infection during neutropenic period  Description: INTERVENTIONS:  - Monitor WBC    Outcome: Progressing

## 2022-09-06 NOTE — ASSESSMENT & PLAN NOTE
· POA; Met SIRs criteria with fever, leukocytosis   · Source with bacteremia, possible C diff colitis  · Infectious disease following  · + evidence of colitis on ct scan  Status post C diff panel; positive toxin PCR but negative EIA  On p o  Vancomycin  · Positive recurrent bacteremia  Presumed secondary to PICC line with use of TPN  Prior history of recent MSSE  · Repeat blood cx neg thus far  Worsening leukocytosis  · Discontinue PICC, peripheral line in place  · On IV vancomycin; dosing per pharmacy  · Ur cx polymicrobial but non significant colony ct   Treatment not indicated  · Cont to trend wbc

## 2022-09-06 NOTE — PROGRESS NOTES
Upon changing patient's picc dressing it looked as though the picc line was out more then it is suppose to be  There was no documentation on when this PICC was placed and how long it was  So SHELTON Erickson reached out to [facility] for and X-ray for verification of PICC line  Ok per [employer] to place IV until verification can be obtained  Will continue to monitor

## 2022-09-06 NOTE — PLAN OF CARE
Reviewed    Problem: Potential for Falls  Goal: Patient will remain free of falls  Description: INTERVENTIONS:  - Educate patient/family on patient safety including physical limitations  - Instruct patient to call for assistance with activity   - Consult OT/PT to assist with strengthening/mobility   - Keep Call bell within reach  - Keep bed low and locked with side rails adjusted as appropriate  - Keep care items and personal belongings within reach  - Initiate and maintain comfort rounds  - Make Fall Risk Sign visible to staff  - Offer Toileting every 4 Hours, in advance of need  - Initiate/Maintain bed alarm  - Apply yellow socks and bracelet for high fall risk patients  - Consider moving patient to room near nurses station  Outcome: Progressing     Problem: MOBILITY - ADULT  Goal: Maintain or return to baseline ADL function  Description: INTERVENTIONS:  -  Assess patient's ability to carry out ADLs; assess patient's baseline for ADL function and identify physical deficits which impact ability to perform ADLs (bathing, care of mouth/teeth, toileting, grooming, dressing, etc )  - Assess/evaluate cause of self-care deficits   - Assess range of motion  - Assess patient's mobility; develop plan if impaired  - Assess patient's need for assistive devices and provide as appropriate  - Encourage maximum independence but intervene and supervise when necessary  - Involve family in performance of ADLs  - Assess for home care needs following discharge   - Consider OT consult to assist with ADL evaluation and planning for discharge  - Provide patient education as appropriate  Outcome: Progressing  Goal: Maintains/Returns to pre admission functional level  Description: INTERVENTIONS:  - Perform BMAT or MOVE assessment daily    - Set and communicate daily mobility goal to care team and patient/family/caregiver     - Collaborate with rehabilitation services on mobility goals if consulted  - Out of bed for toileting  - Record patient progress and toleration of activity level   Outcome: Progressing     Problem: INFECTION - ADULT  Goal: Absence or prevention of progression during hospitalization  Description: INTERVENTIONS:  - Assess and monitor for signs and symptoms of infection  - Monitor lab/diagnostic results  - Monitor all insertion sites, i e  indwelling lines, tubes, and drains  - Monitor endotracheal if appropriate and nasal secretions for changes in amount and color  - Wyatt appropriate cooling/warming therapies per order  - Administer medications as ordered  - Instruct and encourage patient and family to use good hand hygiene technique  - Identify and instruct in appropriate isolation precautions for identified infection/condition  Outcome: Progressing  Goal: Absence of fever/infection during neutropenic period  Description: INTERVENTIONS:  - Monitor WBC    Outcome: Progressing     Problem: SAFETY ADULT  Goal: Patient will remain free of falls  Description: INTERVENTIONS:  - Educate patient/family on patient safety including physical limitations  - Instruct patient to call for assistance with activity   - Consult OT/PT to assist with strengthening/mobility   - Keep Call bell within reach  - Keep bed low and locked with side rails adjusted as appropriate  - Keep care items and personal belongings within reach  - Initiate and maintain comfort rounds  - Make Fall Risk Sign visible to staff  - Offer Toileting every 4 Hours, in advance of need  - Initiate/Maintain bed alarm  - Apply yellow socks and bracelet for high fall risk patients  - Consider moving patient to room near nurses station  Outcome: Progressing  Goal: Maintain or return to baseline ADL function  Description: INTERVENTIONS:  -  Assess patient's ability to carry out ADLs; assess patient's baseline for ADL function and identify physical deficits which impact ability to perform ADLs (bathing, care of mouth/teeth, toileting, grooming, dressing, etc )  - Assess/evaluate cause of self-care deficits   - Assess range of motion  - Assess patient's mobility; develop plan if impaired  - Assess patient's need for assistive devices and provide as appropriate  - Encourage maximum independence but intervene and supervise when necessary  - Involve family in performance of ADLs  - Assess for home care needs following discharge   - Consider OT consult to assist with ADL evaluation and planning for discharge  - Provide patient education as appropriate  Outcome: Progressing  Goal: Maintains/Returns to pre admission functional level  Description: INTERVENTIONS:  - Perform BMAT or MOVE assessment daily    - Set and communicate daily mobility goal to care team and patient/family/caregiver  - Collaborate with rehabilitation services on mobility goals if consulted  - Out of bed for toileting  - Record patient progress and toleration of activity level   Outcome: Progressing     Problem: DISCHARGE PLANNING  Goal: Discharge to home or other facility with appropriate resources  Description: INTERVENTIONS:  - Identify barriers to discharge w/patient and caregiver  - Arrange for needed discharge resources and transportation as appropriate  - Identify discharge learning needs (meds, wound care, etc )  - Arrange for interpretive services to assist at discharge as needed  - Refer to Case Management Department for coordinating discharge planning if the patient needs post-hospital services based on physician/advanced practitioner order or complex needs related to functional status, cognitive ability, or social support system  Outcome: Progressing     Problem: Knowledge Deficit  Goal: Patient/family/caregiver demonstrates understanding of disease process, treatment plan, medications, and discharge instructions  Description: Complete learning assessment and assess knowledge base    Interventions:  - Provide teaching at level of understanding  - Provide teaching via preferred learning methods  Outcome: Progressing     Goal: Pressure injury heals and does not worsen  Description: Interventions:  - Implement low air loss mattress or specialty surface (Criteria met)  - Apply silicone foam dressing  - Apply fecal or urinary incontinence containment device   - Perform passive or active ROM every 2  - Turn and reposition patient & offload bony prominences every 2 hours   - Utilize friction reducing device or surface for transfers   - Consider consults to  interdisciplinary teams such as Palliative  - Consider nutrition services referral as needed  Outcome: Progressing     Problem: Prexisting or High Potential for Compromised Skin Integrity  Goal: Skin integrity is maintained or improved  Description: INTERVENTIONS:  - Identify patients at risk for skin breakdown  - Assess and monitor skin integrity  - Assess and monitor nutrition and hydration status  - Monitor labs   - Assess for incontinence   - Turn and reposition patient  - Assist with mobility/ambulation  - Relieve pressure over bony prominences  - Avoid friction and shearing  - Provide appropriate hygiene as needed including keeping skin clean and dry  - Evaluate need for skin moisturizer/barrier cream  - Collaborate with interdisciplinary team   - Patient/family teaching  - Consider wound care consult   Outcome: Progressing     Problem: Nutrition/Hydration-ADULT  Goal: Nutrient/Hydration intake appropriate for improving, restoring or maintaining nutritional needs  Description: Monitor and assess patient's nutrition/hydration status for malnutrition  Collaborate with interdisciplinary team and initiate plan and interventions as ordered  Monitor patient's weight and dietary intake as ordered or per policy  Utilize nutrition screening tool and intervene as necessary  Determine patient's food preferences and provide high-protein, high-caloric foods as appropriate       INTERVENTIONS:  - Monitor oral intake, urinary output, labs, and treatment plans  - Assess nutrition and hydration status and recommend course of action  - Evaluate amount of meals eaten  - Assist patient with eating if necessary   - Allow adequate time for meals  - Recommend/ encourage appropriate diets, oral nutritional supplements, and vitamin/mineral supplements  - Order, calculate, and assess calorie counts as needed  - Recommend, monitor, and adjust tube feedings and TPN/PPN based on assessed needs  - Assess need for intravenous fluids  - Provide specific nutrition/hydration education as appropriate  - Include patient/family/caregiver in decisions related to nutrition  Outcome: Progressing

## 2022-09-06 NOTE — PROGRESS NOTES
Record of Family Meeting - Palliative and Supportive Care   Maria Teresa Nina 76 y o  female [id number]      Recommendations and Plan:  -  Family all in agreement to pursue comfort cares after family discussion however, patient not in agreement  A family meeting was held for goals of care  This meeting was necessary for determine the appropriate course of treatment  Time of Meeting: 10:45  Meeting Location:  conference room   Participants: patient's sisters, Freida Perea (on phone)  Patients brothers: William Astudillo,      TOLU Cassidy  , Homer Sheffield LSW,       Advanced Directive of POLST available: none    Topics of Discussion:  Goals of care discussions  Patient had been reported as not able to make decisions and lacked insight into diagnosis and prognosis during the course of her entire admission  Goals of care discussion had been planned with family as the patient did not have decision making ability  The patient's family described her as someone who struggled with mental health issues throughout her life  They state over the past several days she was clear that she did not want a PEG tube, NG tube or aggressive interventions  She also spoke to Dr Kathy Diehl with the same wishes  Discussion of hospice cares  Family all in agreement to pursue hospice cares as this reflected the patient's wishes over the past several days  After family discussion, the patient expressed different wishes to [facility], see note  Other Content of Meeting:    Time Involved in Meetin minutes , beginning at approximately  10:45  and ending at approximately [address] Service: [phone]  You can find me on [employer]!

## 2022-09-06 NOTE — ASSESSMENT & PLAN NOTE
· Recent hx of staph epi bacteremia in 7/2022, presumed due to picc line  · Now with possible recurrence 1/2 set staph epi, 2nd set no growth thus far  · ID on board  · On IV vancomycin  · Discontinue PICC  · Pt will need TAVIA  S/p TTE in 7/2022  S/p palliative/family mtg  She had initially indicated that if positive for endocarditis she would not want prolonged abx but unclear if she still would like to proceed   · Repeat blood cx neg  · GI consulted for alternate feeding, recommends to d/c TPN     · Pt now has had a change of heart and is agreeable for PEG, have notified GI

## 2022-09-06 NOTE — PROGRESS NOTES
[address] [facility]  Progress Note - Rock Johnathan 1945, 76 y o  female MRN: [id number]  Unit/Bed#: [facility] 317-01 Encounter: [id number]  Primary Care Provider: Mana Benz MD   Date and time admitted to hospital: 8/30/2022 11:49 PM    * Sepsis [facility])  Assessment & Plan  · POA; Met SIRs criteria with fever, leukocytosis   · Source with bacteremia, possible C diff colitis  · Infectious disease following  · + evidence of colitis on ct scan  Status post C diff panel; positive toxin PCR but negative EIA  On p o  Vancomycin  · Positive recurrent bacteremia  Presumed secondary to PICC line with use of TPN  Prior history of recent MSSE  · Repeat blood cx neg thus far  Worsening leukocytosis  · Discontinue PICC, peripheral line in place  · On IV vancomycin; dosing per pharmacy  · Ur cx polymicrobial but non significant colony ct  Treatment not indicated  · Cont to trend wbc      Bacteremia  Assessment & Plan  · Recent hx of staph epi bacteremia in 7/2022, presumed due to picc line  · Now with possible recurrence 1/2 set staph epi, 2nd set no growth thus far  · ID on board  · On IV vancomycin  · Discontinue PICC  · Pt will need TAVIA  S/p TTE in 7/2022  S/p palliative/family mtg  She had initially indicated that if positive for endocarditis she would not want prolonged abx but unclear if she still would like to proceed   · Repeat blood cx neg  · GI consulted for alternate feeding, recommends to d/c TPN  · Pt now has had a change of heart and is agreeable for PEG, have notified GI    H/O bariatric surgery - bypass  Assessment & Plan  · Patient with history of gastric bypass surgery  Has had significant malnutrition issues and anastomotic ulcerations  Seen by Bariatric Service during recent hospitalization at [facility] who recommended prolonged course of TPN for nutritional optimization and possible revision of surgery in the future    · Was planned to have appt with bariatric sx on 8/20 however had to be rescheduled  · Given recurrent bacteremia; s/p GI consult have recommended discontinuation of TPN  Initially had refused now agreeable for placement of PEG  J tube not a consideration given plans for reversal of savage-en-y in the future  · Notified GI  · Nutrition on board, caloric intake monitoring  Liberize diet, nutrition supplements   · Glucose monitoring, hypoglycemic protocol in place    Acute on chronic anemia  Assessment & Plan  · GI on board  · 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  · S/p recent EGD in 7/22 revealing: Residual marginal ulcer of the gastrojejunostomy anastomosis with improved size  · S/p EGD 9/2 revealing: Large, cratered ulcer in the gastrojejunal anastomosis   · Received PRBC transfusion x 1   · Resumed back on diet  · Cont PPI BID, carafate  · H/H stable    Moderate protein-calorie malnutrition ([address] )  Assessment & Plan  Malnutrition Findings:   Adult Malnutrition type: Chronic illness  Adult Degree of Malnutrition: Other severe protein calorie malnutrition  Malnutrition Characteristics: Weight loss, Muscle loss                  360 Statement: Severe/Chronic malnutrition r/t condition, as evidenced by 4 8% wt loss x < 1 week (9/2/22: 125#, 9/5/22: 119#), and severe muscle mass depletion (temples/clavicle)  Treated with liberalized diet and nutrition supplements, possibly nutrition support pending goals of care    BMI Findings: Body mass index is 20 55 kg/m²  Hx of Savage-en Y gastric bypass, 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  Pt agreeable to PEG  Nutrition following    Goals of care, counseling/discussion  Assessment & Plan  · Palliative on board  S/p family mtg patient has had a change of heart and wants to be txt focused and proceed with PEG placement   She states that she realized that she is not ready to die  · She remains full code      Acute encephalopathy  Assessment & Plan  · Secondary to hospital induced delirium vs metabolic encephalopathy  · No focal deficit on exam  · Delirium precautions    Ambulatory dysfunction  Assessment & Plan  · Exacerbated by diarrhea/dehydration however patient is chronically malnourished  · S/p PT/OT evaluation; to return to SNF    VTE Pharmacologic Prophylaxis:   Pharmacologic: Pharmacologic VTE Prophylaxis contraindicated due to anemia  Mechanical VTE Prophylaxis in Place: Yes    Patient Centered Rounds: I have performed bedside rounds with nursing staff today  Discussions with Specialists or Other Care Team Provider: Palliative, GI    Education and Discussions with Family / Patient: Patient and her family    Time Spent for Care: 30 minutes  More than 50% of total time spent on counseling and coordination of care as described above  Current Length of Stay: 6 day(s)    Current Patient Status: Inpatient   Certification Statement: The patient will continue to require additional inpatient hospital stay due to Acute illness    Discharge Plan:     Code Status: Level 1 - Full Code      Subjective:   Had family mtg today  Appeared mildly confused but easily redirectable  Overnight there was concern that PICC line was more than usual  Afebrile  Diarrhea persists    Objective:     Vitals:   Temp (24hrs), Av 7 °F (37 1 °C), Min:98 2 °F (36 8 °C), Max:99 5 °F (37 5 °C)    Temp:  [98 2 °F (36 8 °C)-99 5 °F (37 5 °C)] 98 2 °F (36 8 °C)  HR:  [76-87] 87  Resp:  [18-20] 18  BP: (111-123)/(58-71) 111/62  SpO2:  [91 %-95 %] 95 %  Body mass index is 20 55 kg/m²  Input and Output Summary (last 24 hours): Intake/Output Summary (Last 24 hours) at 2022 1449  Last data filed at 2022 0559  Gross per 24 hour   Intake 250 ml   Output 1000 ml   Net -750 ml       Physical Exam:     Physical Exam  Cardiovascular:      Rate and Rhythm: Normal rate and regular rhythm  Pulses: Normal pulses  Heart sounds: Normal heart sounds  No murmur heard    Pulmonary:      Effort: Pulmonary effort is normal  No respiratory distress  Breath sounds: Normal breath sounds  No wheezing or rales  Abdominal:      General: Abdomen is flat  Bowel sounds are normal  There is no distension  Palpations: Abdomen is soft  Tenderness: There is no abdominal tenderness  There is no guarding  Musculoskeletal:         General: Normal range of motion  Cervical back: Normal range of motion and neck supple  Right lower leg: No edema  Left lower leg: No edema  Skin:     General: Skin is warm and dry  Neurological:      General: No focal deficit present  Mental Status: She is alert  Mental status is at baseline  Cranial Nerves: No cranial nerve deficit  Motor: No weakness  Additional Data:     Labs:    Results from last 7 days   Lab Units 09/06/22  0439 09/01/22  1203 09/01/22  0618   WBC Thousand/uL 13 91*   < > 14 01*   HEMOGLOBIN g/dL 8 7*   < > 6 4*   HEMATOCRIT % 28 0*   < > 20 9*   PLATELETS Thousands/uL 511*   < > 481*   BANDS PCT % 11*  --   --    NEUTROS PCT %  --   --  67   LYMPHS PCT %  --   --  13*   LYMPHO PCT % 8*  --   --    MONOS PCT %  --   --  7   MONO PCT % 4  --   --    EOS PCT % 6  --  11*    < > = values in this interval not displayed       Results from last 7 days   Lab Units 09/06/22  0439   SODIUM mmol/L 135   POTASSIUM mmol/L 4 0   CHLORIDE mmol/L 107   CO2 mmol/L 23   BUN mg/dL 16   CREATININE mg/dL 1 32*   ANION GAP mmol/L 5   CALCIUM mg/dL 8 8   ALBUMIN g/dL 1 7*   TOTAL BILIRUBIN mg/dL 0 40   ALK PHOS U/L 148*   ALT U/L 66   AST U/L 50*   GLUCOSE RANDOM mg/dL 86     Results from last 7 days   Lab Units 08/31/22  0212   INR  1 21*     Results from last 7 days   Lab Units 09/06/22  1109 09/06/22  0620 09/05/22  2106 09/05/22  1631 09/05/22  0626 09/04/22  2046 09/04/22  1609 09/04/22  0613 09/04/22  0008 09/03/22  2121   POC GLUCOSE mg/dl 128 104 97 92 91 112 88 121 88 91         Results from last 7 days   Lab Units 09/02/22  1042 08/31/22  0415 08/31/22  0212 08/31/22  0206   LACTIC ACID mmol/L  --  0 9 1 0  --    PROCALCITONIN ng/ml 0 40*  --   --  0 54*           * I Have Reviewed All Lab Data Listed Above  * Additional Pertinent Lab Tests Reviewed: All Labs Within Last 24 Hours Reviewed    Imaging:    Imaging Reports Reviewed Today Include:   Imaging Personally Reviewed by Myself Includes:      Recent Cultures (last 7 days):     Results from last 7 days   Lab Units 09/02/22  1044 08/31/22  0635 08/31/22  0501 08/31/22  0206   BLOOD CULTURE  No Growth After 4 Days  No Growth After 4 Days  --   --  Staphylococcus epidermidis*  Staphylococcus pettenkoferi*  No Growth After 5 Days     GRAM STAIN RESULT   --   --   --  Gram positive cocci in clusters*   URINE CULTURE   --   --  80,000-89,000 cfu/ml - 2 strains Gram Negative Jerome Enteric Like*  40,000-49,000 cfu/ml Proteus species*  40,000-49,000 cfu/ml Enterococcus species*  --    C DIFF TOXIN B BY PCR   --  Positive*  --   --        Last 24 Hours Medication List:   Current Facility-Administered Medications   Medication Dose Route Frequency Provider Last Rate   • acetaminophen  650 mg Oral Q6H PRN Carla Garner MD     • ALPRAZolam  0 25 mg Oral TID PRN Marquita Spatz, DO     • alteplase  2 mg Intracatheter Once [employer], PA-C     • clonazePAM  3 mg Oral HS Carla Garner MD     • folic acid  1 mg Oral Daily Carla Garner MD     • levothyroxine  112 mcg Oral Early Morning Carla Garner MD     • midodrine  5 mg Oral TID AC Carla Garner MD     • ondansetron  4 mg Intravenous Q6H PRN Carla Garner MD     • pantoprazole  40 mg Intravenous Q12H [address] Kaylan Miriamsandra Arroyo DO     • sucralfate  1 g Oral 4x Daily (AC & HS) Marquita Spatz, DO     • thiamine  100 mg Oral Daily Carla Garner MD     • traZODone  150 mg Oral HS Carla Garner MD     • vancomycin  1,250 mg Intravenous Q24H Kiera Torres MD Stopped (09/06/22 1143)   • vancomycin  125 mg Oral Q6H Jackqulyn Cogan, MD          Today, Patient Was Seen By: Joe Multani DO    ** Please Note: Dictation voice to text software may have been used in the creation of this document   **

## 2022-09-07 NOTE — PROGRESS NOTES
Vancomycin IV Pharmacy-to-Dose Consultation    Cameron Smith is a 76 y o  female who is currently receiving Vancomycin IV with management by the Pharmacy Consult service  Assessment/Plan:  The patient was reviewed  Renal function is stable but did have a slight increase in Scr from 1 23 to 1 32  Had ordered a BMP but was never collected  No updated Scr today  Will continue to monitor renal function closely  No other signs or symptoms of nephrotoxicity and/or infusion reactions were documented in the chart  Based on today’s assessment, continue current vancomycin (day #7) dosing of 1250 mg every 24 hours, with a plan for trough to be drawn at 0430 on 9/8  We will continue to follow the patient’s culture results and clinical progress daily      Geri Oseguera, Pharmacist

## 2022-09-07 NOTE — PROGRESS NOTES
[address] [facility]  Progress Note - [facility] 1945, 76 y o  female MRN: [id number]  Unit/Bed#: [facility] 317-01 Encounter: [id number]  Primary Care Provider: Wesley Daugherty MD   Date and time admitted to hospital: 8/30/2022 11:49 PM    * Sepsis [facility])  Assessment & Plan  · POA; Met SIRs criteria with fever, leukocytosis   · Source with bacteremia, possible C diff colitis  · Infectious disease following  · + evidence of colitis on ct scan  Status post C diff panel; positive toxin PCR but negative EIA  On p o  Vancomycin  · Positive recurrent bacteremia  Presumed secondary to PICC line with use of TPN  Prior history of recent MSSE  · Repeat blood cx neg thus far  · Discontinue PICC, peripheral line in place  · On IV vancomycin; dosing per pharmacy  · Ur cx polymicrobial but non significant colony ct  Treatment not indicated  · Cont to trend wbc      Bacteremia  Assessment & Plan  · Recent hx of staph epi bacteremia in 7/2022, presumed due to picc line  · Now with possible recurrence 1/2 set staph epi, 2nd set no growth thus far  · ID on board  · On IV vancomycin  · Discontinue PICC  · Pt will need TAVIA  S/p TTE in 7/2022  Pt now agreeable to proceed  · Repeat blood cx neg  · TPN discontinued  Planned for PEG placement tomorrow    H/O bariatric surgery - bypass  Assessment & Plan  · Patient with history of gastric bypass surgery  Has had significant malnutrition issues and anastomotic ulcerations  Seen by Bariatric Service during recent hospitalization at 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  · S/p recent EGD in 7/22 revealing: Residual marginal ulcer of the gastrojejunostomy anastomosis with improved size  · S/p EGD 9/2 revealing: Large, cratered ulcer in the gastrojejunal anastomosis   · Received PRBC transfusion x 1   · Resumed back on diet  · Cont PPI BID, carafate  · H/H stable    Moderate protein-calorie malnutrition ([address] )  Assessment & Plan  Malnutrition Findings:   Adult Malnutrition type: Chronic illness  Adult Degree of Malnutrition: Other severe protein calorie malnutrition  Malnutrition Characteristics: Weight loss, Muscle loss                  360 Statement: Severe/Chronic malnutrition r/t condition, as evidenced by 4 8% wt loss x < 1 week (9/2/22: 125#, 9/5/22: 119#), and severe muscle mass depletion (temples/clavicle)  Treated with liberalized diet and nutrition supplements, possibly nutrition support pending goals of care    BMI Findings: Body mass index is 19 9 kg/m²  Hx of Savage-en Y gastric bypass, 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  Pt agreeable to PEG  Nutrition following    Goals of care, counseling/discussion  Assessment & Plan  · Palliative on board  S/p family mtg patient has had a change of heart and wants to be txt focused and proceed with PEG placement  She states that she realized that she is not ready to die  · She remains full code      Acute encephalopathy  Assessment & Plan  · Secondary to hospital induced delirium vs metabolic encephalopathy  · No focal deficit on exam  · Delirium precautions  · Improved MS    Ambulatory dysfunction  Assessment & Plan  · Exacerbated by diarrhea/dehydration however patient is chronically malnourished     · S/p PT/OT evaluation; to return to SNF      VTE Pharmacologic Prophylaxis:   Pharmacologic: Heparin  Mechanical VTE Prophylaxis in Place: No    Patient Centered Rounds: I have performed bedside rounds with nursing staff today  Discussions with Specialists or Other Care Team Provider:     Education and Discussions with Family / Patient: Patient  Called her sister John Car and updated    Time Spent for Care: 30 minutes  More than 50% of total time spent on counseling and coordination of care as described above  Current Length of Stay: 7 day(s)    Current Patient Status: Inpatient   Certification Statement: The patient will continue to require additional inpatient hospital stay due to acute illness    Discharge Plan: back to snf upon medical stability    Code Status: Level 1 - Full Code      Subjective:   No acute events overnight  MS much improved  Pt refused attempts to obtain labs    Objective:     Vitals:   Temp (24hrs), Av 8 °F (36 6 °C), Min:97 4 °F (36 3 °C), Max:98 1 °F (36 7 °C)    Temp:  [97 4 °F (36 3 °C)-98 1 °F (36 7 °C)] 97 4 °F (36 3 °C)  HR:  [85-88] 88  Resp:  [20-32] 27  BP: ()/(56-62) 98/56  SpO2:  [94 %-97 %] 94 %  Body mass index is 19 9 kg/m²  Input and Output Summary (last 24 hours):     No intake or output data in the 24 hours ending 22 1154    Physical Exam:     Physical Exam  Constitutional:       Comments: Oriented x 3  Cachectic   Cardiovascular:      Rate and Rhythm: Normal rate and regular rhythm  Pulses: Normal pulses  Heart sounds: Normal heart sounds  No murmur heard  Pulmonary:      Effort: Pulmonary effort is normal  No respiratory distress  Breath sounds: Normal breath sounds  No wheezing or rales  Abdominal:      General: Bowel sounds are normal  There is no distension  Palpations: Abdomen is soft  Tenderness: There is no abdominal tenderness  There is no guarding  Musculoskeletal:         General: Normal range of motion  Cervical back: Normal range of motion and neck supple  Right lower leg: No edema  Left lower leg: No edema     Skin:     General: Skin is warm and dry  Neurological:      General: No focal deficit present  Mental Status: She is alert and oriented to person, place, and time  Mental status is at baseline  Cranial Nerves: No cranial nerve deficit  Motor: No weakness  Additional Data:     Labs:    Results from last 7 days   Lab Units 09/06/22  0439 09/01/22  1203 09/01/22  0618   WBC Thousand/uL 13 91*   < > 14 01*   HEMOGLOBIN g/dL 8 7*   < > 6 4*   HEMATOCRIT % 28 0*   < > 20 9*   PLATELETS Thousands/uL 511*   < > 481*   BANDS PCT % 11*  --   --    NEUTROS PCT %  --   --  67   LYMPHS PCT %  --   --  13*   LYMPHO PCT % 8*  --   --    MONOS PCT %  --   --  7   MONO PCT % 4  --   --    EOS PCT % 6  --  11*    < > = values in this interval not displayed  Results from last 7 days   Lab Units 09/06/22  0439   SODIUM mmol/L 135   POTASSIUM mmol/L 4 0   CHLORIDE mmol/L 107   CO2 mmol/L 23   BUN mg/dL 16   CREATININE mg/dL 1 32*   ANION GAP mmol/L 5   CALCIUM mg/dL 8 8   ALBUMIN g/dL 1 7*   TOTAL BILIRUBIN mg/dL 0 40   ALK PHOS U/L 148*   ALT U/L 66   AST U/L 50*   GLUCOSE RANDOM mg/dL 86         Results from last 7 days   Lab Units 09/07/22  0608 09/06/22  2045 09/06/22  1602 09/06/22  1109 09/06/22  0620 09/05/22  2106 09/05/22  1631 09/05/22  0626 09/04/22  2046 09/04/22  1609 09/04/22  0613 09/04/22  0008   POC GLUCOSE mg/dl 95 119 105 128 104 97 92 91 112 88 121 88         Results from last 7 days   Lab Units 09/02/22  1042   PROCALCITONIN ng/ml 0 40*           * I Have Reviewed All Lab Data Listed Above  * Additional Pertinent Lab Tests Reviewed: No New Labs Available For Today    Imaging:    Imaging Reports Reviewed Today Include:   Imaging Personally Reviewed by Myself Includes:     Recent Cultures (last 7 days):     Results from last 7 days   Lab Units 09/02/22  1044   BLOOD CULTURE  No Growth After 4 Days  No Growth After 4 Days         Last 24 Hours Medication List:   Current Facility-Administered Medications   Medication Dose Route Frequency Provider Last Rate   • acetaminophen  650 mg Oral Q6H PRN Sharmin Krause MD     • ALPRAZolam  0 25 mg Oral TID PRN Sangeetha Huddleston DO     • alteplase  2 mg Intracatheter Once [employer], PA-C     • clonazePAM  3 mg Oral HS Sharmin Krause MD     • folic acid  1 mg Oral Daily Sharmin Krause MD     • levothyroxine  112 mcg Oral Early Morning Sharmin Krause MD     • midodrine  5 mg Oral TID AC Sharmin Krause MD     • ondansetron  4 mg Intravenous Q6H PRN Sharmin Krause MD     • pantoprazole  40 mg Intravenous Q12H [facility] Kaylan Arroyo DO     • sucralfate  1 g Oral 4x Daily (AC & HS) Sangeetha Huddleston DO     • thiamine  100 mg Oral Daily Sharmin Krause MD     • traZODone  150 mg Oral HS Sharmin Krause MD     • vancomycin  1,250 mg Intravenous Q24H Stevo Frank MD 1,250 mg (09/07/22 0716)   • vancomycin  125 mg Oral Q6H [facility] Sharmin Krause MD          Today, Patient Was Seen By: Sangeetha Huddleston DO    ** Please Note: Dictation voice to text software may have been used in the creation of this document   **

## 2022-09-07 NOTE — ASSESSMENT & PLAN NOTE
· Secondary to hospital induced delirium vs metabolic encephalopathy  · No focal deficit on exam  · Delirium precautions  · Improved MS

## 2022-09-07 NOTE — ASSESSMENT & PLAN NOTE
· POA; Met SIRs criteria with fever, leukocytosis   · Source with bacteremia, possible C diff colitis  · Infectious disease following  · + evidence of colitis on ct scan  Status post C diff panel; positive toxin PCR but negative EIA  On p o  Vancomycin  · Positive recurrent bacteremia  Presumed secondary to PICC line with use of TPN  Prior history of recent MSSE  · Repeat blood cx neg thus far  · Discontinue PICC, peripheral line in place  · On IV vancomycin; dosing per pharmacy  · Ur cx polymicrobial but non significant colony ct   Treatment not indicated  · Cont to trend wbc

## 2022-09-07 NOTE — ASSESSMENT & PLAN NOTE
· Recent hx of staph epi bacteremia in 7/2022, presumed due to picc line  · Now with possible recurrence 1/2 set staph epi, 2nd set no growth thus far  · ID on board  · On IV vancomycin  · Discontinue PICC  · Pt will need TAVIA  S/p TTE in 7/2022  Pt now agreeable to proceed  · Repeat blood cx neg  · TPN discontinued   Planned for PEG placement tomorrow

## 2022-09-07 NOTE — ASSESSMENT & PLAN NOTE
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

## 2022-09-07 NOTE — ACP (ADVANCE CARE PLANNING)
Pt more awake and oriented today  Spoke with pt about her disease, prognosis, and wishes  She states that she "wants to try any intervention that will give her a chance," including PEG tube  She understands that "something could go wrong at any step and she could die " She also understands that pursuing these aggressive interventions could be associated with discomfort and necessitate future hospitalizations  Despite this, she wishes to pursue any interventions offered to her  She states that she is close to all of her siblings, but especially her brother Howie Melchor  States that she has spoken to Howie Morning about what her wishes are if she could not make decisions for herself  Confirmed level 1 full code status

## 2022-09-07 NOTE — PROGRESS NOTES
Progress Note - Infectious Disease   Twila Ramirez 76 y o  female MRN: [id number]  Unit/Bed#: [facility] 317-01 Encounter: [id number]      Impression/Plan:  1  Sepsis  Pt presenting with significant leukocytosis to 31 and fever to 101 4  She was recently admitted 1 month ago for PICC line infection with Staph epidermidis bacteremia, treated with IV cefazolin  She has long-standing PICC requirement due to #8  Source for sepsis likely #2, also consider #3 vs #4  Pt now afebrile and hemodynamically stable, continues to be lethargic and altered  WBC initially trended down but rising last few days  1/2 BCx now growing staph epidermidis, given recent h/o PICC line bacteremia with coag-neg staph will treat this as true bacteremia  · Antibiotics as below  · Continue to monitor fever curve, vitals, and WBC      2  Coagulase-negative staph bacteremia  1/2 BCx with staph epidermidis  H/o same during last admission about 1 month ago 2/2 PICC line in place due to #8  PICC line remains in place as pt has no other venous access at this time  Repeat BCx from 9/2 now no growth x4 days  TTE with no evidence of vegetations  · Continue IV vancomycin    · Continue to follow repeat BCx   · Remove PICC line when able to obtain other venous access  · Pt amenable to PEG tube for enteral nutrition  · Continue goals of care discussions   · Continue to monitor fever curve, vitals, and WBC      3  Colitis  Presented with 2 days of diarrhea  During last admission 1 month ago pt was found to have positive C diff PCR but negative EIA, indicating colonization rather than infection  She was treated with prophylactic PO vancomycin at that time  CT A/P this admission revealing diffuse colonic thickening suggestive of colitis   C diff this admission again with positive PCR but negative EIA, indicating colonization rather than acute infection  Pt now has rectal tube in place with liquid, dark brown stool in bag  Diarrhea may be 2/2 acute GI blood loss, as in #5  EGD 9/2 with ulcer at [address] anastomosis but no signs of current or recent bleeding  Hb stable last few days around 8    · Continue PO vancomycin 125mg q6h given degree of worsening diarrhea, appearance of colitis on imaging, and clinical sepsis  · Consider workup for other causes of infectious and non-infectous colitis as C diff is not the clear cause  · GI consulted, appreciate recommendations      4  Cystitis vs asymptomatic bacturia  CT A/P also showing bladder wall thickening suggestive of cystitis  UA with innumerable RBCs, WBCs, and bacteria  Pt does endorse dysuria PTA  UCx with 80k cfu enteric-like GNRs, 40k proteus, and 40k enterococcus  Pt no longer having dysuria  S/p cefepime x1 and ceftriaxone x1  · Will not treat as UTI as symptoms have resolved and cfu <100k, likely indicating asymptomatic bacturia   · Continue to monitor fever curve, vitals, and WBC      5  Anemia  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  Pt presented to [facility] earlier this year with upper GI bleeding, found to have marginal ulcer bleeding on endoscopy  Pt with drop in Hb on 9/1 from 8 2 to 6 1, transfused 1U PRBCs  Stool darker in appearance  EGD as in #3  Hb now stable       6  NOAH  Cr on presentation 1 35, likely pre-renal etiology in the setting of diarrhea and dehydration  Initially improved after IVF resuscitation  Now trending up last few days  Avoid nephrotoxic agents and hypotension  Continue to monitor  Will dose adjust antibiotics as indicated  Pharmacy following for vancomycin dosing       7  Elevated LFT's  No h/o liver disease  Labs from earlier this year show occasional elevation in ALP but AST and ALT mostly wnl  CMP on arrival: , , , albumin 1 7, INR 1 21  CT A/P also showing cholelithiasis with no inflammatory changes  Pt does have h/o RUQ pain and gallbladder inflammation, per chart review   Prior LFT abnormalities have been more cholestatic pattern compared to this more hepatocellular/ mixed pattern  Hepatitis panel negative  Other etiology could include shock liver in the setting of sepsis   LFTs downtrending after IVF resuscitation  Continue to monitor  GI consulted      8  TPN use  Started on TPN to maintain nutrition in the setting of bleeding marginal ulcers, as in #5, until bariatric surgery follow up, which was supposed to occur last week but it appears that pt cancelled appointment  PICC in place on arrival with no signs of infection  Pt now agreeable to PEG tube, GI consulted  Antibiotics:  IV vancomycin #8  PO vancomycin #9    Subjective:  Patient feels well today with no complaints  States that she feels "much better" and she appears significantly more awake and oriented  She has no fever, chills, sweats; no nausea, vomiting; no cough, shortness of breath; no pain  No new symptoms  She states diarrhea is improved, rectal tube remains in place  Spoke with pt about her wishes, she states that she wishes to pursue any interventions offered, including PEG tube, and confirms level 1 full code status  (See ACP note from this author from same date for further details on this conversation )    Objective:  Vitals:  Temp:  [97 4 °F (36 3 °C)-98 1 °F (36 7 °C)] 97 4 °F (36 3 °C)  HR:  [85-88] 88  Resp:  [20-32] 27  BP: ()/(56-62) 98/56  SpO2:  [94 %-97 %] 94 %  Temp (24hrs), Av 8 °F (36 6 °C), Min:97 4 °F (36 3 °C), Max:98 1 °F (36 7 °C)  Current: Temperature: (!) 97 4 °F (36 3 °C)    Physical Exam:   General Appearance:  Alert, interactive, nontoxic, no acute distress  Throat: Oropharynx moist without lesions  Lungs:   Mild crackles diffusely  Normal respiratory rate and not in respiratory distress  Heart:  Systolic apical murmur vs hyperdynamic flow  Regular rate and rhythm  Abdomen:   Soft, non-tender, non-distended, positive bowel sounds  Extremities: No clubbing, cyanosis or edema   Skin: No new rashes or lesions   No draining wounds noted  Labs, Imaging, & Other studies:   All pertinent labs and imaging studies were personally reviewed  Results from last 7 days   Lab Units 09/06/22  0439 09/05/22  0528 09/04/22  0513   WBC Thousand/uL 13 91* 14 00* 11 25*   HEMOGLOBIN g/dL 8 7* 8 1* 8 8*   PLATELETS Thousands/uL 511* 502* 495*     Results from last 7 days   Lab Units 09/06/22  0439 09/05/22  0528 09/04/22  0513 09/03/22  0518   SODIUM mmol/L 135 137 137 138   POTASSIUM mmol/L 4 0 3 8 3 9 3 5   CHLORIDE mmol/L 107 107 108 112*   CO2 mmol/L 23 24 23 23   BUN mg/dL 16 19 20 22   CREATININE mg/dL 1 32* 1 23 1 12 1 14   EGFR ml/min/1 73sq m 38 41 46 45   CALCIUM mg/dL 8 8 8 9 8 9 8 7   AST U/L 50*  --  44 41   ALT U/L 66  --  73 80*   ALK PHOS U/L 148*  --  141* 130*     Results from last 7 days   Lab Units 09/02/22  1044   BLOOD CULTURE  No Growth After 4 Days  No Growth After 4 Days       Results from last 7 days   Lab Units 09/02/22  1042   PROCALCITONIN ng/ml 0 40*

## 2022-09-07 NOTE — PROGRESS NOTES
Progress Note - Infectious Disease   Arcadio Williamson 76 y o  female MRN: [id number]  Unit/Bed#: [facility] 317-01 Encounter: [id number]      Impression/Plan:  1  Sepsis, POA  Patient noted with brisk leukocytosis along with fever  Multiple sources at this time including 2, 3 and 6 likely contributing   Initially improved but white count up trending, patient refused further labs  Patient's overall clinical status appears to be poor and she remains malnourished  Continues with full care  Will continue IV and oral antibiotic  Plans for additional imaging as below  Continue to trend fever curve/vitals  Repeat CBC/chemistry tomorrow, if patient allows  Palliative care evaluation appreciated  Additional supportive care as per primary  Duration of therapy pending clinical course     2  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  One of 2 cultures have now returned positive, 2 different sub species   Patient had previous episode of line-related bacteremia  Ultimately difficult to exclude recurrence in the setting of 7  PICC line removed on 09/06  Repeat cultures NGTD  2D echo unremarkable      Will continue on IV vancomycin   Pharmacy consult for vancomycin monitoring  Continue oral vancomycin as below  Pending transesophageal echo with white count/recurrence  If workup negative will plan for 2 weeks of therapy  Plans for PEG tube tomorrow with GI  Palliative care evaluation appreciated     3  Colitis on CT images and diarrhea   Patient having ongoing diarrhea which may be related to 6  Ultimately cannot rule out C diff either  PCR positive     Continue oral vancomycin 125 q 6  Monitor abdominal exam  Continue to trend fever curve/WBC  Tentative plan for 14 days of therapy      4  Abnormal UA  Patient without symptoms   Asymptomatic bacteriuria  No antibiotics continued for this issue      5  Transaminitis  Jenna Henson suspect that this is related to volume losses   Continue to trend for now         6   Acute anemia and prior GI bleeding   Patient's hemoglobin had acutely decreased     She has had prior bleeding marginal ulcers which has contributed to recurrent admissions chronic TPN use   Also present on EGD on   Goals of care discussions noted  Antibiotics as above  PEG tube tomorrow with GI  Palliative care evaluation appreciated        7  Chronic TPN use  Has been on since admission in  to optimize her nutrition   Unfortunately now her course has been complicated by bacteremia requiring PICC line replacement    Plans for PEG tube with GI tomorrow  Ongoing follow-up by palliative care     Above plan discussed in detail with patient, primary Service and Cardiology advanced practitioner  ID consult service will continue to follow        Antibiotics:  Vancomycin IV 8  Oral vancomycin 9    Subjective:  Patient seen at bedside and she denied having any nausea vomiting, chest pain or shortness of breath  She was requesting milk  Again continue to encourage the patient to take Ensure which she stated to me that she does not need or want  Patient aware of plans for PEG tube and cardiac imaging  Objective:  Vitals:  Temp:  [97 4 °F (36 3 °C)-97 6 °F (36 4 °C)] 97 6 °F (36 4 °C)  HR:  [78-88] 78  Resp:  [14-32] 14  BP: ()/(48-62) 101/48  SpO2:  [94 %-98 %] 98 %  Temp (24hrs), Av 5 °F (36 4 °C), Min:97 4 °F (36 3 °C), Max:97 6 °F (36 4 °C)  Current: Temperature: 97 6 °F (36 4 °C)    Physical Exam:   General Appearance:  Alert, interactive, nontoxic, no acute distress  Chronically ill-appearing and frail  Throat: Oropharynx moist without lesions  Lungs:   Clear to auscultation bilaterally; no wheezes, rhonchi or rales; respirations unlabored on room air   Heart:  RRR; no murmur, rub or gallop appreciated   Abdomen:   Soft, non-tender, non-distended, positive bowel sounds  Extremities: No clubbing, cyanosis or edema   Skin: No new rashes or lesions  No new draining wounds noted    PICC line removed yesterday  Labs, Imaging, & Other studies:   All pertinent labs and imaging studies were personally reviewed  Results from last 7 days   Lab Units 09/06/22  0439 09/05/22  0528 09/04/22  0513   WBC Thousand/uL 13 91* 14 00* 11 25*   HEMOGLOBIN g/dL 8 7* 8 1* 8 8*   PLATELETS Thousands/uL 511* 502* 495*     Results from last 7 days   Lab Units 09/06/22  0439 09/05/22  0528 09/04/22  0513 09/03/22  0518   POTASSIUM mmol/L 4 0   < > 3 9 3 5   CHLORIDE mmol/L 107   < > 108 112*   CO2 mmol/L 23   < > 23 23   BUN mg/dL 16   < > 20 22   CREATININE mg/dL 1 32*   < > 1 12 1 14   EGFR ml/min/1 73sq m 38   < > 46 45   CALCIUM mg/dL 8 8   < > 8 9 8 7   AST U/L 50*  --  44 41   ALT U/L 66  --  73 80*   ALK PHOS U/L 148*  --  141* 130*    < > = values in this interval not displayed  Results from last 7 days   Lab Units 09/02/22  1044   BLOOD CULTURE  No Growth After 5 Days  No Growth After 5 Days

## 2022-09-07 NOTE — NURSING NOTE
Pt allowed me to attempt iv/bloodwork this am   IV site inserted, but unable to draw bllod from line  As to not lose iv site, will attempt  To restick for labs  Pt at this point refuses restick  Pt refuses to take am meds  Pt was saturated with urine, initially saying that she wouldn't let me clean her, but did let me  Will retry labs/meds later

## 2022-09-07 NOTE — PLAN OF CARE
Problem: Potential for Falls  Goal: Patient will remain free of falls  Description: INTERVENTIONS:  - Educate patient/family on patient safety including physical limitations  - Instruct patient to call for assistance with activity   - Consult OT/PT to assist with strengthening/mobility   - Keep Call bell within reach  - Keep bed low and locked with side rails adjusted as appropriate  - Keep care items and personal belongings within reach  - Initiate and maintain comfort rounds  - Make Fall Risk Sign visible to staff  - Offer Toileting every Hours, in advance of need  - Initiate/Maintainalarm  - Obtain necessary fall risk management equipmen  - Apply yellow socks and bracelet for high fall risk patients  - Consider moving patient to room near nurses station  Outcome: Progressing     Problem: MOBILITY - ADULT  Goal: Maintain or return to baseline ADL function  Description: INTERVENTIONS:  -  Assess patient's ability to carry out ADLs; assess patient's baseline for ADL function and identify physical deficits which impact ability to perform ADLs (bathing, care of mouth/teeth, toileting, grooming, dressing, etc )  - Assess/evaluate cause of self-care deficits   - Assess range of motion  - Assess patient's mobility; develop plan if impaired  - Assess patient's need for assistive devices and provide as appropriate  - Encourage maximum independence but intervene and supervise when necessary  - Involve family in performance of ADLs  - Assess for home care needs following discharge   - Consider OT consult to assist with ADL evaluation and planning for discharge  - Provide patient education as appropriate  Outcome: Progressing  Goal: Maintains/Returns to pre admission functional level  Description: INTERVENTIONS:  - Perform BMAT or MOVE assessment daily    - Set and communicate daily mobility goal to care team and patient/family/caregiver     - Collaborate with rehabilitation services on mobility goals if consulted  - Perform Range of  - Out of bed for toileting  - Record patient progress and toleration of activity level   Outcome: Progressing     Problem: INFECTION - ADULT  Goal: Absence or prevention of progression during hospitalization  Description: INTERVENTIONS:  - Assess and monitor for signs and symptoms of infection  - Monitor lab/diagnostic results  - Monitor all insertion sites, i e  indwelling lines, tubes, and drains  - Monitor endotracheal if appropriate and nasal secretions for changes in amount and color  - Marissa appropriate cooling/warming therapies per order  - Administer medications as ordered  - Instruct and encourage patient and family to use good hand hygiene technique  - Identify and instruct in appropriate isolation precautions for identified infection/condition  Outcome: Progressing  Goal: Absence of fever/infection during neutropenic period  Description: INTERVENTIONS:  - Monitor WBC    Outcome: Progressing     Problem: SAFETY ADULT  Goal: Patient will remain free of falls  Description: INTERVENTIONS:  - Educate patient/family on patient safety including physical limitations  - Instruct patient to call for assistance with activity   - Consult OT/PT to assist with strengthening/mobility   - Keep Call bell within reach  - Keep bed low and locked with side rails adjusted as appropriate  - Keep care items and personal belongings within reach  - Initiate and maintain comfort rounds  - Make Fall Risk Sign visible to staff  - Offer Toileting everyHours, in advance of need  - Initiate/Maintainlarm  - Obtain necessary fall risk management equipment:  - Apply yellow socks and bracelet for high fall risk patients  - Consider moving patient to room near nurses station  Outcome: Progressing  Goal: Maintain or return to baseline ADL function  Description: INTERVENTIONS:  -  Assess patient's ability to carry out ADLs; assess patient's baseline for ADL function and identify physical deficits which impact ability to perform ADLs (bathing, care of mouth/teeth, toileting, grooming, dressing, etc )  - Assess/evaluate cause of self-care deficits   - Assess range of motion  - Assess patient's mobility; develop plan if impaired  - Assess patient's need for assistive devices and provide as appropriate  - Encourage maximum independence but intervene and supervise when necessary  - Involve family in performance of ADLs  - Assess for home care needs following discharge   - Consider OT consult to assist with ADL evaluation and planning for discharge  - Provide patient education as appropriate  Outcome: Progressing  Goal: Maintains/Returns to pre admission functional level  Description: INTERVENTIONS:  - Perform BMAT or MOVE assessment daily    - Set and communicate daily mobility goal to care team and patient/family/caregiver     - Collaborate with rehabilitation services on mobility goals if consulted  - Perform Range of Motio  - Out of bed for toileting  - Record patient progress and toleration of activity level   Outcome: Progressing     Problem: DISCHARGE PLANNING  Goal: Discharge to home or other facility with appropriate resources  Description: INTERVENTIONS:  - Identify barriers to discharge w/patient and caregiver  - Arrange for needed discharge resources and transportation as appropriate  - Identify discharge learning needs (meds, wound care, etc )  - Arrange for interpretive services to assist at discharge as needed  - Refer to Case Management Department for coordinating discharge planning if the patient needs post-hospital services based on physician/advanced practitioner order or complex needs related to functional status, cognitive ability, or social support system  Outcome: Progressing     Problem: Knowledge Deficit  Goal: Patient/family/caregiver demonstrates understanding of disease process, treatment plan, medications, and discharge instructions  Description: Complete learning assessment and assess knowledge base   Interventions:  - Provide teaching at level of understanding  - Provide teaching via preferred learning methods  Outcome: Progressing     Problem: SKIN/TISSUE INTEGRITY - ADULT  Goal: Skin Integrity remains intact(Skin Breakdown Prevention)  Description: Assess:  -Perform Gabe assessment every   -Clean and moisturize skin every   -Inspect skin when repositioning, toileting, and assisting with ADLS  -Assess under medical devices such as very  -Assess extremities for adequate circulation and sensation     Bed Management:  -Have minimal linens on bed & keep smooth, unwrinkled  -Change linens as needed when moist or perspiring  -Avoid sitting or lying in one position for more thanhours while in bed  -Keep HOB ategrees     Toileting:  -Offer bedside commode  -Assess for incontinence ever  -Use incontinent care products after each incontinent episode such as    Activity:  -Mobilize patient  times a day  -Encourage activity and walks on unit  -Encourage or provide ROM exercises   -Turn and reposition patient everyHours  -Use appropriate equipment to lift or move patient in bed  -Instruct/ Assist with weight shifting everywhen out of bed in chair  -Consider limitation of chair timhour intervals    Skin Care:  -Avoid use of baby powder, tape, friction and shearing, hot water or constrictive clothing  -Relieve pressure over bony prominences using  -Do not massage red bony areas    Next Steps:  -Teach patient strategies to minimize risks such as   -Consider consults to  interdisciplinary teams such as  Outcome: Progressing  Goal: Incision(s), wounds(s) or drain site(s) healing without S/S of infection  Description: INTERVENTIONS  - Assess and document dressing, incision, wound bed, drain sites and surrounding tissue  - Provide patient and family education  - Perform skin care/dressing changes every  Outcome: Progressing  Goal: Pressure injury heals and does not worsen  Description: Interventions:  - Implement low air loss mattress or specialty surface (Criteria met)  - Apply silicone foam dressing  - Instruct/assist with weight shifting every minutes when in chair   - Limit chair time to  hour intervals  - Use special pressure reducing interventions such aswhen in chair   - Apply fecal or urinary incontinence containment device   - Perform passive or active ROM every  - Turn and reposition patient & offload bony prominences every hours   - Utilize friction reducing device or surface for transfers   - Consider consults to  interdisciplinary teams such as  - Use incontinent care products after each incontinent episode such as  - Consider nutrition services referral as needed  Outcome: Progressing     Problem: Prexisting or High Potential for Compromised Skin Integrity  Goal: Skin integrity is maintained or improved  Description: INTERVENTIONS:  - Identify patients at risk for skin breakdown  - Assess and monitor skin integrity  - Assess and monitor nutrition and hydration status  - Monitor labs   - Assess for incontinence   - Turn and reposition patient  - Assist with mobility/ambulation  - Relieve pressure over bony prominences  - Avoid friction and shearing  - Provide appropriate hygiene as needed including keeping skin clean and dry  - Evaluate need for skin moisturizer/barrier cream  - Collaborate with interdisciplinary team   - Patient/family teaching  - Consider wound care consult   Outcome: Progressing     Problem: Nutrition/Hydration-ADULT  Goal: Nutrient/Hydration intake appropriate for improving, restoring or maintaining nutritional needs  Description: Monitor and assess patient's nutrition/hydration status for malnutrition  Collaborate with interdisciplinary team and initiate plan and interventions as ordered  Monitor patient's weight and dietary intake as ordered or per policy  Utilize nutrition screening tool and intervene as necessary   Determine patient's food preferences and provide high-protein, high-caloric foods as appropriate       INTERVENTIONS:  - Monitor oral intake, urinary output, labs, and treatment plans  - Assess nutrition and hydration status and recommend course of action  - Evaluate amount of meals eaten  - Assist patient with eating if necessary   - Allow adequate time for meals  - Recommend/ encourage appropriate diets, oral nutritional supplements, and vitamin/mineral supplements  - Order, calculate, and assess calorie counts as needed  - Recommend, monitor, and adjust tube feedings and TPN/PPN based on assessed needs  - Assess need for intravenous fluids  - Provide specific nutrition/hydration education as appropriate  - Include patient/family/caregiver in decisions related to nutrition  Outcome: Progressing

## 2022-09-07 NOTE — PROGRESS NOTES
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, chronic TPN use, GERD, hypothyroidism who was admitted to hospital for sepsis secondary to Staph epi bacteremia, C diff colitis  GI is consulted for PEG tube placement for severe malnutrition  1  Severe protein calorie malnutrition  Evaluated previously during hospital admission were patient refused PEG tube placement  After family meeting yesterday patient now agreeable  Not on blood thinners  Spoke with patient's brother who is patient's primary point of contact who is aware procedure  Both patient and brother where that given altered anatomy there is a chance that PEG tube cannot be placed endoscopically  • Will proceed with EGD with possible PEG tube placement tomorrow, if no window is present or it is not safe to place PEG endoscopically will defer to Interventional Radiology for placement  • Continue regular diet, NPO at midnight  • Further recommendations after PEG tube placement  2  C diff colitis  Patient has multiple episodes of diarrhea early in admission  CT scan showed evidence of colitis  · Appreciate infectious disease input  Continue p o  vancomycin q 6 per ID  · Discontinue rectal tube  · Monitor bowel movements, WBC count  3  Anemia  EGD showed marginal ulcer near gastrojejunal anastomosis  Nonbleeding  Hemoglobin stable at 8 7  Status post 1 unit PRBCs  No evidence of further bleeding  · Continue PPI 40 mg q 12, can change to p o  once PEG tube is placed  · Continue Carafate q i d   · Monitor hemoglobin transfuse for less than 7  Rest of care per primary team     ______________________________________________________________________    Subjective:  Seen and examined lying in bed in no acute distress    Denies any abdominal pain, nausea, vomiting  Bowel movements through rectal tube have slowed down  Now agreeable for PEG tube placement  REVIEW OF SYSTEMS:    Review of Systems   Constitutional: Negative for chills and fever  HENT: Negative for congestion and sinus pressure  Respiratory: Negative for cough and shortness of breath  Cardiovascular: Negative for chest pain, palpitations and leg swelling  Gastrointestinal: Negative for abdominal pain, diarrhea, nausea and vomiting  Genitourinary: Negative for dysuria and hematuria  Musculoskeletal: Negative for arthralgias and back pain  Skin: Negative for color change and rash  Neurological: Negative for dizziness and headaches  Psychiatric/Behavioral: Negative for agitation and confusion  All other systems reviewed and are negative           Historical Information   Past Medical History:   Diagnosis Date   • Anemia    • Anxiety    • Bipolar disorder ([facility] [address] )    • Colon polyp    • Disease of thyroid gland    • Essential hypertension    • Essential tremor    • Fibromyalgia, primary    • GERD (gastroesophageal reflux disease)    • Inflammatory polyarthropathy (HCC)    • Mammogram abnormal    • Pressure injury of skin      Past Surgical History:   Procedure Laterality Date   • BREAST BIOPSY Right     benign   • CARPAL TUNNEL RELEASE Bilateral    • COLONOSCOPY     • EGD AND COLONOSCOPY  2014   • GASTRIC BYPASS  2012   • MAMMO NEEDLE LOCALIZATION RIGHT ([employer]) Right 2012   • MAMMO NEEDLE LOCALIZATION RIGHT (ALL [employer]) Right 2012   • ULNAR NERVE TRANSPOSITION Right      Social History   Social History     Substance and Sexual Activity   Alcohol Use Yes   • Alcohol/week: 4 0 standard drinks   • Types: 4 Glasses of wine per week    Comment: rare     Social History     Substance and Sexual Activity   Drug Use Never     Social History     Tobacco Use   Smoking Status Former Smoker   • Quit date:    • Years since quittin 7   Smokeless Tobacco Never Used     Family History   Problem Relation Age of Onset   • No Known Problems Mother    • No Known Problems Father    • No Known Problems Sister    • No Known Problems Maternal Grandmother    • No Known Problems Maternal Grandfather    • No Known Problems Paternal Grandmother    • No Known Problems Paternal Grandfather    • No Known Problems Sister    • No Known Problems Sister    • Stomach cancer Maternal Aunt    • No Known Problems Maternal Aunt    • No Known Problems Maternal Aunt    • No Known Problems Maternal Aunt    • No Known Problems Paternal Aunt    • Leukemia Other        Meds/Allergies     Medications Prior to Admission   Medication   • clonazePAM (KlonoPIN) 1 mg tablet   • folic acid (FOLVITE) 1 mg tablet   • levothyroxine 112 mcg tablet   • midodrine (PROAMATINE) 5 mg tablet   • sucralfate (CARAFATE) 1 g tablet   • thiamine (VITAMIN B1) 100 mg tablet   • traZODone (DESYREL) 50 mg tablet     Current Facility-Administered Medications   Medication Dose Route Frequency   • acetaminophen (TYLENOL) tablet 650 mg  650 mg Oral Q6H PRN   • ALPRAZolam (XANAX) tablet 0 25 mg  0 25 mg Oral TID PRN   • alteplase (CATHFLO) injection 2 mg  2 mg Intracatheter Once   • clonazePAM (KlonoPIN) tablet 3 mg  3 mg Oral HS   • folic acid (FOLVITE) tablet 1 mg  1 mg Oral Daily   • levothyroxine tablet 112 mcg  112 mcg Oral Early Morning   • midodrine (PROAMATINE) tablet 5 mg  5 mg Oral TID AC   • ondansetron (ZOFRAN) injection 4 mg  4 mg Intravenous Q6H PRN   • pantoprazole (PROTONIX) injection 40 mg  40 mg Intravenous Q12H RHONA   • sucralfate (CARAFATE) tablet 1 g  1 g Oral 4x Daily (AC & HS)   • thiamine tablet 100 mg  100 mg Oral Daily   • traZODone (DESYREL) tablet 150 mg  150 mg Oral HS   • vancomycin (VANCOCIN) 1,250 mg in sodium chloride 0 9 % 250 mL IVPB  1,250 mg Intravenous Q24H   • vancomycin (VANCOCIN) oral solution 125 mg  125 mg Oral Q6H [address]       No Known Allergies        Objective     Blood pressure 109/62, pulse 87, temperature 98 1 °F (36 7 °C), temperature source Oral, resp  rate (!) 32, height 5' 4" (1 626 m), weight 52 6 kg (115 lb 15 4 oz), SpO2 97 %  Body mass index is 19 9 kg/m²  No intake or output data in the 24 hours ending 09/07/22 0706      PHYSICAL EXAM:      Physical Exam  Vitals and nursing note reviewed  Constitutional:       General: She is not in acute distress  Appearance: Normal appearance  She is well-developed  She is cachectic  She is ill-appearing  HENT:      Head: Normocephalic and atraumatic  Mouth/Throat:      Mouth: Mucous membranes are moist    Eyes:      Extraocular Movements: Extraocular movements intact  Conjunctiva/sclera: Conjunctivae normal       Pupils: Pupils are equal, round, and reactive to light  Cardiovascular:      Rate and Rhythm: Normal rate and regular rhythm  Pulses: Normal pulses  Heart sounds: Normal heart sounds  No murmur heard  No friction rub  No gallop  Pulmonary:      Effort: Pulmonary effort is normal  No respiratory distress  Breath sounds: Normal breath sounds  No wheezing or rales  Abdominal:      General: Abdomen is flat  Bowel sounds are normal  There is no distension  Palpations: Abdomen is soft  Tenderness: There is no abdominal tenderness  There is no guarding  Musculoskeletal:      Cervical back: Neck supple  Right lower leg: No edema  Left lower leg: No edema  Skin:     General: Skin is warm and dry  Neurological:      General: No focal deficit present  Mental Status: She is alert and oriented to person, place, and time  Psychiatric:         Mood and Affect: Mood normal          Behavior: Behavior normal           Lab Results:   No results displayed because visit has over 200 results  Imaging Studies: I have personally reviewed pertinent imaging studies  [id number] [facility]    Gastroenterology Fellow  [id number]  Available via [employer]  9/7/2022 7:06 AM

## 2022-09-08 ENCOUNTER — ANESTHESIA (INPATIENT)
Dept: GASTROENTEROLOGY | Facility: HOSPITAL | Age: 77
End: 2022-09-08
Payer: COMMERCIAL

## 2022-09-08 ENCOUNTER — ANESTHESIA EVENT (INPATIENT)
Dept: GASTROENTEROLOGY | Facility: HOSPITAL | Age: 77
End: 2022-09-08
Payer: COMMERCIAL

## 2022-09-08 RX ORDER — CEFAZOLIN SODIUM 1 G/3ML
INJECTION, POWDER, FOR SOLUTION INTRAMUSCULAR; INTRAVENOUS AS NEEDED
Status: DISCONTINUED | OUTPATIENT
Start: 2022-09-08 | End: 2022-09-08

## 2022-09-08 RX ORDER — PROPOFOL 10 MG/ML
INJECTION, EMULSION INTRAVENOUS AS NEEDED
Status: DISCONTINUED | OUTPATIENT
Start: 2022-09-08 | End: 2022-09-08

## 2022-09-08 RX ORDER — LIDOCAINE HYDROCHLORIDE 10 MG/ML
INJECTION, SOLUTION EPIDURAL; INFILTRATION; INTRACAUDAL; PERINEURAL AS NEEDED
Status: DISCONTINUED | OUTPATIENT
Start: 2022-09-08 | End: 2022-09-08

## 2022-09-08 RX ORDER — SODIUM CHLORIDE 9 MG/ML
INJECTION, SOLUTION INTRAVENOUS CONTINUOUS PRN
Status: DISCONTINUED | OUTPATIENT
Start: 2022-09-08 | End: 2022-09-08

## 2022-09-08 RX ADMIN — PROPOFOL 50 MG: 10 INJECTION, EMULSION INTRAVENOUS at 15:50

## 2022-09-08 RX ADMIN — CEFAZOLIN 1000 MG: 1 INJECTION, POWDER, FOR SOLUTION INTRAMUSCULAR; INTRAVENOUS at 15:43

## 2022-09-08 RX ADMIN — LIDOCAINE HYDROCHLORIDE 50 MG: 10 INJECTION, SOLUTION EPIDURAL; INFILTRATION; INTRACAUDAL; PERINEURAL at 15:43

## 2022-09-08 RX ADMIN — PROPOFOL 50 MG: 10 INJECTION, EMULSION INTRAVENOUS at 15:57

## 2022-09-08 RX ADMIN — PROPOFOL 50 MG: 10 INJECTION, EMULSION INTRAVENOUS at 16:07

## 2022-09-08 RX ADMIN — PROPOFOL 50 MG: 10 INJECTION, EMULSION INTRAVENOUS at 15:43

## 2022-09-08 RX ADMIN — SODIUM CHLORIDE: 0.9 INJECTION, SOLUTION INTRAVENOUS at 15:38

## 2022-09-08 NOTE — PHYSICAL THERAPY NOTE
09/08/22 1000   Note Type   Note Type Treatment   Cancel Reasons Other   Pt declining session due to uncertainty about having PEG, pain and generally agitated w/ attempts  Will follow as pt allows    Kvng Cruz PT, DPT CSRS

## 2022-09-08 NOTE — CONSULTS
Recommend TF Plus Oral Diet Regular with po supplements  Suggest when able to use PEG start nocturnal Jevity 1 2 at 20ml/hr gradually increase by 10ml/hr Q 4hrs to goal 69ml/hr running x 12 hrs such as 6pm-6am Add 50ml free H20 flush before and after each cycle  Monitor labs, wt and tolerance  TF providing 994 kcal with 46gms Pro 668ml + 100 ml flush total 768ml free H20  As patient refusing to take Banatrol in applesauce, consider adding 1 pkt mixed as slurry via PEG at least 1 x daily as needed for diarrhea

## 2022-09-08 NOTE — NUTRITION
09/08/22 1614   Recommendations/Interventions   Recommendations to Provider Recommend TF Plus Oral Diet Regular with po supplements  Suggest when able to use PEG start nocturnal Jevity 1 2 at 20ml/hr gradually increase by 10ml/hr Q 4hrs to goal 69ml/hr running x 12 hrs such as 6pm-6am Add 50ml free H20 flush before and after each cycle  Monitor labs, wt and tolerance  TF providing 994 kcal with 46gms Pro 668ml + 100 ml flush total 768ml free H20  As patient refusing to take Banatrol in applesauce, consider adding 1 pkt mixed as slurry via PEG at least 1 x daily as needed for diarrhea

## 2022-09-08 NOTE — OCCUPATIONAL THERAPY NOTE
Occupational Therapy         Patient Name: Lenwood Fabry  [id number] Date: 9/8/2022 09/08/22 1100   OT Last Visit   OT Visit Date 09/08/22   Note Type   Note Type Treatment   Cancel Reasons Other     Pt sleeping on approach - able to arouse pt however lethargic/sedated - declined participation in therapy 2* fatigue - noted plan for PEG tube placement later today - will defer    Ade Strong, OT

## 2022-09-08 NOTE — PROGRESS NOTES
[address] [facility]  Progress Note - Shelia Leo 1945, 76 y o  female MRN: [id number]  Unit/Bed#: [facility] 317-01 Encounter: [id number]  Primary Care Provider: Marisela Rabago MD   Date and time admitted to hospital: 8/30/2022 11:49 PM    * Sepsis [facility])  Assessment & Plan  · POA; Met SIRs criteria with fever, leukocytosis   · Source with bacteremia, possible C diff colitis  · Infectious disease following  · + evidence of colitis on ct scan  Status post C diff panel; positive toxin PCR but negative EIA  On p o  Vancomycin  · Positive recurrent bacteremia  Presumed secondary to PICC line with use of TPN  Prior history of recent MSSE  · Repeat blood cx neg thus far  · Discontinue PICC, peripheral line in place  · On IV vancomycin; dosing per pharmacy; monitor levels  · Ur cx polymicrobial but non significant colony ct  Treatment not indicated  · Cont to trend wbc      Bacteremia  Assessment & Plan  · Recent hx of staph epi bacteremia in 7/2022, presumed due to picc line  · Now with possible recurrence 1/2 set staph epi, 2nd set no growth thus far  · ID on board  · On IV vancomycin  · Discontinue PICC  · Pt will need TAVIA  S/p TTE in 7/2022  Pt now agreeable to proceed, planned for tomorrow  · Repeat blood cx neg  · TPN discontinued  S/p PEG  · Duration of abx pending TAVIA    H/O bariatric surgery - bypass  Assessment & Plan  · Patient with history of gastric bypass surgery  Has had significant malnutrition issues and anastomotic ulcerations  Seen by Bariatric Service during recent hospitalization at [address] [facility] who recommended prolonged course of TPN for nutritional optimization and possible revision of surgery in the future  · Was planned to have appt with bariatric sx on 8/20 however had to be rescheduled  · Given recurrent bacteremia; s/p GI consult; have recommended discontinuation of TPN  S/p PEG, to use tomorrow  · Nutrition on board, caloric intake monitoring  Liberize diet, nutrition supplements   · Glucose monitoring, hypoglycemic protocol in place    Acute on chronic anemia  Assessment & Plan  · GI on board  · 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  · S/p recent EGD in 7/22 revealing: Residual marginal ulcer of the gastrojejunostomy anastomosis with improved size  · S/p EGD 9/2 revealing: Large, cratered ulcer in the gastrojejunal anastomosis   · Received PRBC transfusion x 1   · Resumed back on diet  · Cont PPI BID, carafate  · H/H stable    Moderate protein-calorie malnutrition ([address] )  Assessment & Plan  Malnutrition Findings:   Adult Malnutrition type: Chronic illness  Adult Degree of Malnutrition: Other severe protein calorie malnutrition  Malnutrition Characteristics: Weight loss, Muscle loss                  360 Statement: Severe/Chronic malnutrition r/t condition, as evidenced by 4 8% wt loss x < 1 week (9/2/22: 125#, 9/5/22: 119#), and severe muscle mass depletion (temples/clavicle)  Treated with liberalized diet and nutrition supplements, possibly nutrition support pending goals of care    BMI Findings: Body mass index is 20 05 kg/m²  Hx of Savage-en Y gastric bypass, 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  Pt agreeable to PEG  Nutrition following    Goals of care, counseling/discussion  Assessment & Plan  · Palliative on board  S/p family mtg patient has had a change of heart and wants to be txt focused and proceed with PEG placement  She states that she realized that she is not ready to die  · She remains full code      Acute encephalopathy  Assessment & Plan  · Secondary to hospital induced delirium vs metabolic encephalopathy  · No focal deficit on exam  · Delirium precautions  · Improved MS    Ambulatory dysfunction  Assessment & Plan  · Exacerbated by diarrhea/dehydration however patient is chronically malnourished     · S/p PT/OT evaluation; to return to SNF      VTE Pharmacologic Prophylaxis:   Pharmacologic: Heparin  Mechanical VTE Prophylaxis in Place: No    Patient Centered Rounds: I have performed bedside rounds with nursing staff today  Discussions with Specialists or Other Care Team Provider:     Education and Discussions with Family / Patient: Patient    Time Spent for Care: 30 minutes  More than 50% of total time spent on counseling and coordination of care as described above  Current Length of Stay: 8 day(s)    Current Patient Status: Inpatient   Certification Statement: The patient will continue to require additional inpatient hospital stay due to Acute illness    Discharge Plan:     Code Status: Level 1 - Full Code      Subjective:   S/p PEG feels sore  Otherwise no other complaints or acute events    Objective:     Vitals:   Temp (24hrs), Av 4 °F (36 3 °C), Min:96 7 °F (35 9 °C), Max:97 9 °F (36 6 °C)    Temp:  [96 7 °F (35 9 °C)-97 9 °F (36 6 °C)] 97 9 °F (36 6 °C)  HR:  [] 99  Resp:  [15-20] 16  BP: ()/(45-58) 101/54  SpO2:  [92 %-97 %] 94 %  Body mass index is 20 05 kg/m²  Input and Output Summary (last 24 hours): Intake/Output Summary (Last 24 hours) at 2022 1809  Last data filed at 2022 1607  Gross per 24 hour   Intake 300 ml   Output --   Net 300 ml       Physical Exam:     Physical Exam  Cardiovascular:      Rate and Rhythm: Normal rate and regular rhythm  Pulses: Normal pulses  Heart sounds: Normal heart sounds  Pulmonary:      Effort: Pulmonary effort is normal  No respiratory distress  Breath sounds: Normal breath sounds  No wheezing or rales  Abdominal:      General: Abdomen is flat  Bowel sounds are normal  There is no distension  Palpations: Abdomen is soft  Tenderness: There is abdominal tenderness  There is no guarding  Comments: + peg  Tenderness at insertion site   Musculoskeletal:         General: Normal range of motion  Cervical back: Normal range of motion and neck supple  Right lower leg: No edema  Left lower leg: No edema  Skin:     General: Skin is warm and dry  Neurological:      General: No focal deficit present  Mental Status: She is alert and oriented to person, place, and time  Mental status is at baseline  Cranial Nerves: No cranial nerve deficit  Motor: No weakness  Additional Data:     Labs:    Results from last 7 days   Lab Units 09/08/22  0551 09/06/22  0439   WBC Thousand/uL 11 50* 13 91*   HEMOGLOBIN g/dL 8 6* 8 7*   HEMATOCRIT % 27 3* 28 0*   PLATELETS Thousands/uL 445* 511*   BANDS PCT %  --  11*   LYMPHO PCT %  --  8*   MONO PCT %  --  4   EOS PCT %  --  6     Results from last 7 days   Lab Units 09/08/22  0551 09/06/22  0439   SODIUM mmol/L 133* 135   POTASSIUM mmol/L 3 6 4 0   CHLORIDE mmol/L 104 107   CO2 mmol/L 24 23   BUN mg/dL 13 16   CREATININE mg/dL 1 51* 1 32*   ANION GAP mmol/L 5 5   CALCIUM mg/dL 8 7 8 8   ALBUMIN g/dL  --  1 7*   TOTAL BILIRUBIN mg/dL  --  0 40   ALK PHOS U/L  --  148*   ALT U/L  --  66   AST U/L  --  50*   GLUCOSE RANDOM mg/dL 90 86         Results from last 7 days   Lab Units 09/08/22  1114 09/08/22  0543 09/08/22  0119 09/07/22  1244 09/07/22  0608 09/06/22  2045 09/06/22  1602 09/06/22  1109 09/06/22  0620 09/05/22  2106 09/05/22  1631 09/05/22  0626   POC GLUCOSE mg/dl 94 96 96 153* 95 119 105 128 104 97 92 91         Results from last 7 days   Lab Units 09/02/22  1042   PROCALCITONIN ng/ml 0 40*           * I Have Reviewed All Lab Data Listed Above  * Additional Pertinent Lab Tests Reviewed: All Labs Within Last 24 Hours Reviewed    Imaging:    Imaging Reports Reviewed Today Include:   Imaging Personally Reviewed by Myself Includes:      Recent Cultures (last 7 days):     Results from last 7 days   Lab Units 09/02/22  1044   BLOOD CULTURE  No Growth After 5 Days  No Growth After 5 Days         Last 24 Hours Medication List:   Current Facility-Administered Medications   Medication Dose Route Frequency Provider Last Rate   • acetaminophen  650 mg Oral Q6H PRN Sofía Jaramillo MD     • ALPRAZolam  0 25 mg Oral TID PRN Gerhardt Grams, DO     • alteplase  2 mg Intracatheter Once [employer], [employer]     • clonazePAM  3 mg Oral HS Sofía Jaramillo MD     • folic acid  1 mg Oral Daily Sofía Jaramillo MD     • heparin (porcine)  5,000 Units Subcutaneous [facility] Gerhardt Grams, DO     • levothyroxine  112 mcg Oral Early Morning Car Garcia MD     • midodrine  5 mg Oral TID AC Sofía Jaramillo MD     • ondansetron  4 mg Intravenous Q6H PRN Sofía Jaramillo MD     • pantoprazole  40 mg Intravenous Q12H [facility] Miriam Arroyo, DO     • sucralfate  1 g Oral 4x Daily (AC & HS) Gerhardt Grams, DO     • thiamine  100 mg Oral Daily Sofía Jaramillo MD     • traZODone  150 mg Oral HS Sofía Jaramillo MD     • [START ON 9/9/2022] vancomycin  15 mg/kg Intravenous Daily PRN Gerhardt Grams, DO     • vancomycin  125 mg Oral Q6H Olegario Lawson MD          Today, Patient Was Seen By: Gerhardt Grams, DO    ** Please Note: Dictation voice to text software may have been used in the creation of this document   **

## 2022-09-08 NOTE — ASSESSMENT & PLAN NOTE
· Patient with history of gastric bypass surgery  Has had significant malnutrition issues and anastomotic ulcerations  Seen by Bariatric Service during recent hospitalization at [facility] who recommended prolonged course of TPN for nutritional optimization and possible revision of surgery in the future  · Was planned to have appt with bariatric sx on 8/20 however had to be rescheduled  · Given recurrent bacteremia; s/p GI consult; have recommended discontinuation of TPN  S/p PEG, to use tomorrow  · Nutrition on board, caloric intake monitoring   Liberize diet, nutrition supplements   · Glucose monitoring, hypoglycemic protocol in place

## 2022-09-08 NOTE — QUICK NOTE
Patient's chart reviewed  Currently afebrile  White blood cell count down trending  No new cultures  Patient had PICC line placed and chest x-ray reviewed  Vitals otherwise stable  Tentative plans as of yesterday noted to be placement of PEG tube and transesophageal echo  At this time will continue the patient on oral vancomycin as well as intravenous vancomycin  Pharmacy following for vancomycin monitoring  Will plan to follow-up on transesophageal echo  If workup negative and with cleared cultures tentative plan is for 2 weeks of IV vancomycin for a potential bacteremia from a PICC line which was removed on 09/06  ID consult service will re-evaluate this patient again tomorrow  Please call if questions in the interim

## 2022-09-08 NOTE — PROGRESS NOTES
Vancomycin Assessment    Raymond Roland is a 76 y o  female who is currently receiving vancomycin 1250 mg q24h for bacteremia     Relevant clinical data and objective history reviewed:  Creatinine   Date Value Ref Range Status   09/08/2022 1 51 (H) 0 60 - 1 30 mg/dL Final     Comment:     Standardized to IDMS reference method   09/06/2022 1 32 (H) 0 60 - 1 30 mg/dL Final     Comment:     Standardized to IDMS reference method   09/05/2022 1 23 0 60 - 1 30 mg/dL Final     Comment:     Standardized to IDMS reference method     Vancomycin Rm   Date Value Ref Range Status   07/27/2022 16 0 10 0 - 20 0 ug/mL Final     /58   Pulse 88   Temp 97 7 °F (36 5 °C) (Oral)   Resp 14   Ht 5' 4" (1 626 m)   Wt 53 kg (116 lb 12 8 oz)   SpO2 94%   BMI 20 05 kg/m²   No intake/output data recorded  Lab Results   Component Value Date/Time    BUN 13 09/08/2022 05:51 AM    BUN 17 11/14/2020 10:02 AM    WBC 11 50 (H) 09/08/2022 05:51 AM    HGB 8 6 (L) 09/08/2022 05:51 AM    HCT 27 3 (L) 09/08/2022 05:51 AM    MCV 99 (H) 09/08/2022 05:51 AM     (H) 09/08/2022 05:51 AM     Temp Readings from Last 3 Encounters:   09/08/22 97 7 °F (36 5 °C) (Oral)   08/09/22 99 3 °F (37 4 °C) (Oral)   07/20/22 98 4 °F (36 9 °C) (Temporal)     Vancomycin Days of Therapy: 8    Assessment/Plan  The patient is currently on vancomycin utilizing pulse dosing due to patient decrease in renal function (1 23 to 1 32 to 1 51)  Baseline risks associated with therapy include: advanced age  The patient is receiving 1250 mg IV q24h with the most recent vancomycin level being at steady-state and supratherapeutic trough of 27 0 based on a goal of 15-20 (appropriate for most indications) ; therefore, after clinical evaluation will be changed to 750 mg once daily PRN when random level is < 20   Pharmacy will continue to follow closely for s/sx of nephrotoxicity, infusion reactions, and appropriateness of therapy    Will be checking a random level tomorrow morning with AM labs to reassess  Pharmacy will continue to follow the patient’s culture results and clinical progress daily      Francois Wnog, Pharmacist

## 2022-09-08 NOTE — ANESTHESIA POSTPROCEDURE EVALUATION
Post-Op Assessment Note    CV Status:  Stable  Pain Score: 0    Pain management: adequate     Mental Status:  Arousable   Hydration Status:  Stable   PONV Controlled:  None   Airway Patency:  Patent      Post Op Vitals Reviewed: Yes      Staff: Anesthesiologist, CRNA         No complications documented      BP   102/49   Temp 98   Pulse 107   Resp 16   SpO2 98

## 2022-09-08 NOTE — PLAN OF CARE
Problem: Potential for Falls  Goal: Patient will remain free of falls  Description: INTERVENTIONS:  - Educate patient/family on patient safety including physical limitations  - Instruct patient to call for assistance with activity   - Consult OT/PT to assist with strengthening/mobility   - Keep Call bell within reach  - Keep bed low and locked with side rails adjusted as appropriate  - Keep care items and personal belongings within reach  - Initiate and maintain comfort rounds  - Make Fall Risk Sign visible to staff  - Offer Toileting every **2* Hours, in advance of need  - Initiate/Maintain *BED**alarM  - Apply yellow socks and bracelet for high fall risk patients  - Consider moving patient to room near nurses station  Outcome: Progressing     Problem: INFECTION - ADULT  Goal: Absence or prevention of progression during hospitalization  Description: INTERVENTIONS:  - Assess and monitor for signs and symptoms of infection  - Monitor lab/diagnostic results  - Monitor all insertion sites, i e  indwelling lines, tubes, and drains  - Monitor endotracheal if appropriate and nasal secretions for changes in amount and color  - Callands appropriate cooling/warming therapies per order  - Administer medications as ordered  - Instruct and encourage patient and family to use good hand hygiene technique  - Identify and instruct in appropriate isolation precautions for identified infection/condition  Outcome: Progressing     Problem: SAFETY ADULT  Goal: Patient will remain free of falls  Description: INTERVENTIONS:  - Educate patient/family on patient safety including physical limitations  - Instruct patient to call for assistance with activity   - Consult OT/PT to assist with strengthening/mobility   - Keep Call bell within reach  - Keep bed low and locked with side rails adjusted as appropriate  - Keep care items and personal belongings within reach  - Initiate and maintain comfort rounds  - Make Fall Risk Sign visible to staff  - Offer Toileting every *2** Hours, in advance of need  - Initiate/Maintain **BED*alarm  - Apply yellow socks and bracelet for high fall risk patients  - Consider moving patient to room near nurses station  Outcome: Progressing

## 2022-09-08 NOTE — ASSESSMENT & PLAN NOTE
· POA; Met SIRs criteria with fever, leukocytosis   · Source with bacteremia, possible C diff colitis  · Infectious disease following  · + evidence of colitis on ct scan  Status post C diff panel; positive toxin PCR but negative EIA  On p o  Vancomycin  · Positive recurrent bacteremia  Presumed secondary to PICC line with use of TPN  Prior history of recent MSSE  · Repeat blood cx neg thus far  · Discontinue PICC, peripheral line in place  · On IV vancomycin; dosing per pharmacy; monitor levels  · Ur cx polymicrobial but non significant colony ct   Treatment not indicated  · Cont to trend wbc

## 2022-09-08 NOTE — ASSESSMENT & PLAN NOTE
Malnutrition Findings:   Adult Malnutrition type: Chronic illness  Adult Degree of Malnutrition: Other severe protein calorie malnutrition  Malnutrition Characteristics: Weight loss, Muscle loss                  360 Statement: Severe/Chronic malnutrition r/t condition, as evidenced by 4 8% wt loss x < 1 week (9/2/22: 125#, 9/5/22: 119#), and severe muscle mass depletion (temples/clavicle)  Treated with liberalized diet and nutrition supplements, possibly nutrition support pending goals of care    BMI Findings: Body mass index is 20 05 kg/m²     Hx of Savage-en Y gastric bypass, 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  Pt agreeable to PEG  Nutrition following

## 2022-09-08 NOTE — ASSESSMENT & PLAN NOTE
· Recent hx of staph epi bacteremia in 7/2022, presumed due to picc line  · Now with possible recurrence 1/2 set staph epi, 2nd set no growth thus far  · ID on board  · On IV vancomycin  · Discontinue PICC  · Pt will need TAVIA  S/p TTE in 7/2022  Pt now agreeable to proceed, planned for tomorrow  · Repeat blood cx neg  · TPN discontinued   S/p PEG  · Duration of abx pending TAVIA

## 2022-09-08 NOTE — ANESTHESIA PREPROCEDURE EVALUATION
Procedure:  EGD    Relevant Problems   CARDIO   (+) Dyspnea on exertion      ENDO   (+) Hypothyroidism      GI/HEPATIC   (+) Gastric ulcer   (+) Gastroesophageal reflux disease without esophagitis   (+) H/O bariatric surgery - bypass      /RENAL   (+) Acute kidney insufficiency      HEMATOLOGY   (+) Acute blood loss anemia   (+) Acute on chronic anemia   (+) Anemia      MUSCULOSKELETAL   (+) Fibromyalgia syndrome   (+) Fibromyalgia, primary      NEURO/PSYCH   (+) Depression   (+) Fibromyalgia syndrome   (+) Fibromyalgia, primary      PULMONARY   (+) Dyspnea on exertion   (+) Shortness of breath        Physical Exam    Airway    Mallampati score: II  TM Distance: >3 FB  Neck ROM: limited     Dental   No notable dental hx     Cardiovascular      Pulmonary      Other Findings        Anesthesia Plan  ASA Score- 3     Anesthesia Type- IV sedation with anesthesia with ASA Monitors  Additional Monitors:   Airway Plan:           Plan Factors-Exercise tolerance (METS): <4 METS  Chart reviewed  EKG reviewed  Imaging results reviewed  Existing labs reviewed  Patient summary reviewed  Patient is not a current smoker  Induction- intravenous  Postoperative Plan-     Informed Consent- Anesthetic plan and risks discussed with patient  I personally reviewed this patient with the CRNA  Discussed and agreed on the Anesthesia Plan with the CRNA  Sarbjit Zhang

## 2022-09-09 ENCOUNTER — ANESTHESIA (INPATIENT)
Dept: NON INVASIVE DIAGNOSTICS | Facility: HOSPITAL | Age: 77
End: 2022-09-09
Payer: COMMERCIAL

## 2022-09-09 ENCOUNTER — ANESTHESIA EVENT (INPATIENT)
Dept: NON INVASIVE DIAGNOSTICS | Facility: HOSPITAL | Age: 77
End: 2022-09-09
Payer: COMMERCIAL

## 2022-09-09 LAB — FUNGAL BLOOD CULTURE: NORMAL

## 2022-09-09 RX ORDER — LIDOCAINE HYDROCHLORIDE 20 MG/ML
INJECTION, SOLUTION EPIDURAL; INFILTRATION; INTRACAUDAL; PERINEURAL AS NEEDED
Status: DISCONTINUED | OUTPATIENT
Start: 2022-09-09 | End: 2022-09-09

## 2022-09-09 RX ORDER — PROPOFOL 10 MG/ML
INJECTION, EMULSION INTRAVENOUS CONTINUOUS PRN
Status: DISCONTINUED | OUTPATIENT
Start: 2022-09-09 | End: 2022-09-09

## 2022-09-09 RX ORDER — KETAMINE HYDROCHLORIDE 50 MG/ML
INJECTION, SOLUTION, CONCENTRATE INTRAMUSCULAR; INTRAVENOUS AS NEEDED
Status: DISCONTINUED | OUTPATIENT
Start: 2022-09-09 | End: 2022-09-09

## 2022-09-09 RX ORDER — SODIUM CHLORIDE 9 MG/ML
INJECTION, SOLUTION INTRAVENOUS CONTINUOUS PRN
Status: DISCONTINUED | OUTPATIENT
Start: 2022-09-09 | End: 2022-09-09

## 2022-09-09 RX ORDER — PROPOFOL 10 MG/ML
INJECTION, EMULSION INTRAVENOUS AS NEEDED
Status: DISCONTINUED | OUTPATIENT
Start: 2022-09-09 | End: 2022-09-09

## 2022-09-09 RX ADMIN — SODIUM CHLORIDE: 0.9 INJECTION, SOLUTION INTRAVENOUS at 11:25

## 2022-09-09 RX ADMIN — LIDOCAINE HYDROCHLORIDE 100 MG: 20 INJECTION, SOLUTION EPIDURAL; INFILTRATION; INTRACAUDAL; PERINEURAL at 11:31

## 2022-09-09 RX ADMIN — PROPOFOL 30 MG: 10 INJECTION, EMULSION INTRAVENOUS at 11:31

## 2022-09-09 RX ADMIN — PROPOFOL 40 MCG/KG/MIN: 10 INJECTION, EMULSION INTRAVENOUS at 11:32

## 2022-09-09 RX ADMIN — KETAMINE HYDROCHLORIDE 20 MG: 50 INJECTION, SOLUTION INTRAMUSCULAR; INTRAVENOUS at 11:31

## 2022-09-09 NOTE — PROGRESS NOTES
Progress Note - Infectious Disease   Tacho Cuello 76 y o  female MRN: [id number]  Unit/Bed#: [facility] 317-01 Encounter: [id number]      Impression/Plan:  1  Sepsis, POA  Patient noted with brisk leukocytosis along with fever  Multiple sources at this time including 2, 3 and 6 likely contributing   Initially improved but white count up trending, patient refused further labs  Patient's overall clinical status appears to be poor and she remains malnourished  Continues with full care  Continue IV and oral antibiotic as below  Duration of antibiotic therapy as below  Continue to trend fever curve/vitals while admitted  Repeat labs ordered for tomorrow   Will adjust antibiotics further based on culture data if needed  Palliative care evaluation appreciated  Additional supportive care/discharge as per primary     2  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  One of 2 cultures have now returned positive, 2 different sub species   Patient had previous episode of line-related bacteremia  Ultimately difficult to exclude recurrence in the setting of 7  PICC line removed on 09/06  Repeat cultures NG  2D echo unremarkable  Transesophageal echo negative  PICC line replaced on 09/09  Additional susceptibilities requested from micro lab for completeness  Plan for total 2 weeks of therapy  Difficulty with vanco dosing and acute elevation in creatinine noted  Transition to daptomycin 325 mg every 48 hours  Will dose adjust antibiotics as needed  Weekly labs with CBCD, CMP and CK, ordered for tomorrow  Plan for total of 2 weeks of therapy through 9/15  Continue oral vancomycin 125 q 6 through 9/18  Discontinue PICC line after IV antibiotics completed  No formal follow-up required in ID office  Follow-up additional susceptibilities from micro for completeness  ID office staff notified of the above  Additional supportive care/discharge as per primary     3   Colitis on CT images and diarrhea   Patient was having diarrhea which may be related to 6  Ultimately cannot rule out C diff either  PCR positive     Continue oral vancomycin 125 q 6 through   Monitor abdominal exam  Continue to trend fever curve/WBC    4  Abnormal UA  Patient without symptoms   Asymptomatic bacteriuria  No antibiotics continued for this issue      5  Transaminitis  Albina Hurd suspect that this is related to volume losses   Continue to trend for now         6  Acute anemia and prior GI bleeding   Patient's hemoglobin had acutely decreased     She has had prior bleeding marginal ulcers which has contributed to recurrent admissions chronic TPN use   Also present on EGD on   Status post PEG tube on   Antibiotics as above  Tube feeds initiated as per primary/GI  Ongoing follow-up by Nutrition  Palliative care evaluation appreciated        7  Chronic TPN use  Has been on since admission in  to optimize her nutrition   Unfortunately now her course has been complicated by bacteremia  Now transitioned to PEG tube feeding  Plans to remove PICC line after antibiotics completed  PEG tube feeding as per primary/GI  Remove PICC line after antibiotics completed as above  Palliative care evaluation appreciated    Above plan discussed in detail with the patient, primary Service and clinical ID pharmacist     ID consult service will re-evaluate this patient again on Monday, if she is admitted  Please contact ID attending on call if questions       Antibiotics:  Vancomycin IV 10  Oral vancomycin 11    Subjective:  Patient has no fever, chills, sweats; no nausea, vomiting, diarrhea; no cough, shortness of breath; no pain  No new symptoms  She reports overall just feeling fatigued  Transesophageal echo noted to be negative      Objective:  Vitals:  Temp:  [96 7 °F (35 9 °C)-97 9 °F (36 6 °C)] 97 9 °F (36 6 °C)  HR:  [] 99  Resp:  [15-20] 16  BP: ()/(45-56) 101/54  SpO2:  [92 %-97 %] 94 %  Temp (24hrs), Av 3 °F (36 3 °C), Min:96 7 °F (35 9 °C), Max:97 9 °F (36 6 °C)  Current: Temperature: 97 9 °F (36 6 °C)    Physical Exam:   General Appearance:  Alert, interactive, nontoxic, no acute distress  Chronically ill-appearing and frail  Throat: Oropharynx moist without lesions  Lungs:   Clear to auscultation bilaterally; no wheezes, rhonchi or rales; respirations unlabored on room air   Heart:  RRR; no murmur, rub or gallop appreciated   Abdomen:   Soft, non-tender, non-distended, positive bowel sounds  Extremities: No clubbing, cyanosis or edema   Skin: No new rashes or lesions  No new draining wounds noted  Labs, Imaging, & Other studies:   All pertinent labs and imaging studies were personally reviewed  Results from last 7 days   Lab Units 09/09/22  0637 09/08/22  0551 09/06/22  0439   WBC Thousand/uL 11 85* 11 50* 13 91*   HEMOGLOBIN g/dL 8 1* 8 6* 8 7*   PLATELETS Thousands/uL 441* 445* 511*     Results from last 7 days   Lab Units 09/08/22  0551 09/06/22  0439 09/05/22  0528 09/04/22  0513 09/03/22  0518   POTASSIUM mmol/L 3 6 4 0   < > 3 9 3 5   CHLORIDE mmol/L 104 107   < > 108 112*   CO2 mmol/L 24 23   < > 23 23   BUN mg/dL 13 16   < > 20 22   CREATININE mg/dL 1 51* 1 32*   < > 1 12 1 14   EGFR ml/min/1 73sq m 32 38   < > 46 45   CALCIUM mg/dL 8 7 8 8   < > 8 9 8 7   AST U/L  --  50*  --  44 41   ALT U/L  --  66  --  73 80*   ALK PHOS U/L  --  148*  --  141* 130*    < > = values in this interval not displayed  Results from last 7 days   Lab Units 09/02/22  1044   BLOOD CULTURE  No Growth After 5 Days  No Growth After 5 Days

## 2022-09-09 NOTE — ASSESSMENT & PLAN NOTE
Malnutrition Findings:   Adult Malnutrition type: Chronic illness  Adult Degree of Malnutrition: Other severe protein calorie malnutrition  Malnutrition Characteristics: Weight loss, Muscle loss                  360 Statement: Severe/Chronic malnutrition r/t condition, as evidenced by 4 8% wt loss x < 1 week (9/2/22: 125#, 9/5/22: 119#), and severe muscle mass depletion (temples/clavicle)  Treated with liberalized diet and nutrition supplements, possibly nutrition support pending goals of care    BMI Findings: Body mass index is 19 91 kg/m²  Hx of Savage-en Y gastric bypass, 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

## 2022-09-09 NOTE — CASE MANAGEMENT
Case Management Discharge Planning Note    Patient name Greg Mora  Location [facility] 505-44 MRN [id number]  : 1945 Date 2022       Current Admission Date: 2022  Current Admission Diagnosis:Sepsis [facility])   Patient Active Problem List    Diagnosis Date Noted   • Acute encephalopathy 2022   • Moderate protein-calorie malnutrition ([address] ) 2022   • Bacteremia 2022   • Acute on chronic anemia 2022   • Goals of care, counseling/discussion 2022   • Colitis presumed to be due to infection 2022   • Acute cystitis without hematuria 2022   • Acute kidney insufficiency 2022   • Fall 2022   • Diarrhea 2022   • Sepsis ([address] ) 2022   • Sacral ulcer ([facility] [address] ) 2022   • Gram-positive bacteremia 2022   • Severe protein-calorie malnutrition ([facility] [address] ) 2022   • Bilateral leg edema 2022   • Ambulatory dysfunction 2022   • Acute blood loss anemia 2022   • Gastric ulcer 2022   • Fever 2022   • Anemia 2022   • Dyspnea on exertion 2022   • Generalized weakness 2022   • Elevated LFTs 2022   • Hyponatremia 2022   • Abnormal CT scan 2022   • H/O bariatric surgery - bypass 2022   • Cerumen debris on tympanic membrane of right ear 2022   • Nasal congestion 2022   • Bilateral hearing loss 2022   • Fibromyalgia syndrome 2021   • Osteopenia of both forearms 2021   • Medicare annual wellness visit, subsequent 2021   • Shortness of breath 2021   • Acute bronchitis 2021   • COVID-19 2021   • Dysphasia 2020   • Epigastric pain 2020   • Hypothyroidism 2020   • Gastroesophageal reflux disease without esophagitis 2020   • Depression 2020   • Fibromyalgia, primary 2020   • Iron deficiency 2020   • Numbness of foot 2020      LOS (days): 9  Geometric Mean LOS (GMLOS) (days): 4 80  Days to LOS:-4 6     OBJECTIVE:  Risk of Unplanned Readmission Score: 29 67         Current admission status: Inpatient   Preferred Pharmacy:   [facility] [address] [facility], [address] [place] [address] [address] [facility] [address]  Phone: [phone] Fax: [phone]    [place] [address]  Phone: [phone] Fax: [phone]    Primary Care Provider: Angelica Sheth MD    Primary Insurance: [facility]  Secondary Insurance:     DISCHARGE DETAILS:    SLim update Pt no longer hospice now needs IV atbx and LTC  New referrals placed

## 2022-09-09 NOTE — ASSESSMENT & PLAN NOTE
· Exacerbated by diarrhea/dehydration however patient is chronically malnourished  · S/p PT/OT evaluation; to return to SNF  · Family/PT wishing alternate SNF   CM on board to find replacement

## 2022-09-09 NOTE — ANESTHESIA POSTPROCEDURE EVALUATION
Post-Op Assessment Note    CV Status:  Stable  Pain Score: 0    Pain management: adequate     Mental Status:  Alert and awake   Hydration Status:  Euvolemic   PONV Controlled:  Controlled   Airway Patency:  Patent      Post Op Vitals Reviewed: Yes      Staff: CRNA         No complications documented      BP   96/54   Temp  rn note   Pulse  75   Resp   15   SpO2   100%

## 2022-09-09 NOTE — ANESTHESIA PREPROCEDURE EVALUATION
Procedure:  ATVIA    Relevant Problems   CARDIO   (+) Dyspnea on exertion      ENDO   (+) Hypothyroidism      GI/HEPATIC   (+) Gastric ulcer   (+) Gastroesophageal reflux disease without esophagitis   (+) H/O bariatric surgery - bypass      /RENAL   (+) Acute kidney insufficiency      HEMATOLOGY   (+) Acute blood loss anemia   (+) Acute on chronic anemia   (+) Anemia      MUSCULOSKELETAL   (+) Fibromyalgia syndrome   (+) Fibromyalgia, primary      NEURO/PSYCH   (+) Depression   (+) Fibromyalgia syndrome   (+) Fibromyalgia, primary      PULMONARY   (+) Dyspnea on exertion   (+) Shortness of breath        Physical Exam    Airway    Mallampati score: II  TM Distance: >3 FB  Neck ROM: full     Dental       Cardiovascular  Cardiovascular exam normal    Pulmonary  Pulmonary exam normal     Other Findings        Anesthesia Plan  ASA Score- 3     Anesthesia Type- IV sedation with anesthesia with ASA Monitors  Additional Monitors:   Airway Plan:           Plan Factors-    Chart reviewed  Patient summary reviewed  Induction- intravenous  Postoperative Plan-     Informed Consent- Anesthetic plan and risks discussed with patient  I personally reviewed this patient with the CRNA  Discussed and agreed on the Anesthesia Plan with the CRNA  Nas Fernandes physician: Ignacia Stoner MD Ordering physician: Brad Figueroa MD Study date: 22     Name: Mary Mora                       : 1945  MRN: [id number]                       Age: 76 y o  Patient Status: Inpatient          Gender: female    Vitals    Height Weight BSA (Calculated - m2) BP Pulse   5' 4" (1 626 m) 56 7 kg (125 lb) 1 6 sq meters 109/61 69       PACS Images     Show images for Echo follow up/limited w/ contrast if indicated    Study Details    This transthoracic echocardiogram was performed at bedside  This was a routine, inpatient study  Study quality was adequate   A limited 2D, color flow Doppler, spectral Doppler, 2D, color flow Doppler and spectral Doppler transthoracic echocardiogram was performed  The apical, parasternal and subcostal views were obtained  Indications  Priority: Routine  Other, Please Specify in Comments - r/op vegetation       History    Sepsis; Bacteremia; Covid-19; SOB; DELUNA; Bilateral leg edema       Interpretation Summary         Left Ventricle: Left ventricular cavity size is normal  Wall thickness is normal  The left ventricular ejection fraction is 75%  Systolic function is normal  Wall motion is normal  Diastolic function is normal     Right Ventricle: Right ventricular cavity size is normal  Systolic function is normal     Atrial Septum: The septum bows into the right atrium, suggesting increased left atrial pressure    Aortic Valve: The aortic valve is trileaflet  The leaflets are not thickened  The leaflets are moderately calcified  The leaflets exhibit normal mobility    Mitral Valve: There is moderate regurgitation    Tricuspid Valve: There is mild to moderate regurgitation          Findings    Left Ventricle Left ventricular cavity size is normal  Wall thickness is normal  The left ventricular ejection fraction is 75%  Systolic function is normal   Wall motion is normal  Diastolic function is normal    Right Ventricle Right ventricular cavity size is normal  Systolic function is normal  Wall thickness is normal    Left Atrium The atrium is normal in size  Right Atrium The atrium is normal in size  Atrial Septum The septum bows into the right atrium, suggesting increased left atrial pressure  Aortic Valve The aortic valve is trileaflet  The leaflets are not thickened  The leaflets are moderately calcified  The leaflets exhibit normal mobility  There is trace regurgitation  The aortic valve has no significant stenosis  Mitral Valve There is moderate regurgitation  There is no evidence of stenosis  The mitral valve has normal structure and normal function     Tricuspid Valve Tricuspid valve structure is normal  There is mild to moderate regurgitation  There is no evidence of stenosis  The estimated right ventricular systolic pressure is 53 18 mmHg

## 2022-09-09 NOTE — QUICK NOTE
9/9/2022 4:08 PM -  Rosy Sauer's chart and case were reviewed by Jean Nash  Mode of review included electronic chart check  Per review, symptoms remain controlled on current regimen and no changes are made at this time  Please continue the regimen in place, and review our last note for details  For dispo plan, please review Case Management notes  Stopped by patient's room twice during course of the day and she was off the unit       Palliative care will return on 9/12  Patient is not appropriate for outpatient follow-up  We will make arrangements through our office  For urgent issues or any questions/concerns, please notify on-call provider via [employer]  You may also call our answering service 24/7 at   TOLU Nash  Palliative and Supportive Care  Clinic/Answering Service: [phone]  You can find me on [employer]!

## 2022-09-09 NOTE — ASSESSMENT & PLAN NOTE
· POA; Met SIRs criteria with fever, leukocytosis   · Source with bacteremia, C diff colitis  · Infectious disease following  · + evidence of colitis on ct scan  Status post C diff panel; positive toxin PCR but negative EIA  On p o  Vancomycin through 9/18  Diarrhea improved off rectal tube  · Positive recurrent bacteremia  Presumed secondary to PICC line with use of TPN  Prior history of recent MSSE  · Repeat blood cx neg thus far  · Discontinued presenting PICC  New PICC ordered (9/9)  · Had been on IV vanco however difficulty managing levels thus transitioned to IV daptomycin through 9/15  · Ur cx polymicrobial but non significant colony ct   Treatment not indicated  · Cont to trend wbc

## 2022-09-09 NOTE — PROGRESS NOTES
Progress Note -  [facility]  José Galo 76 y o  female MRN: [id number]  Unit/Bed#: [facility] 317-01 Encounter: [id number]      ASSESSMENT AND PLAN:      69-year-old female past medical history of Savage-en-Y gastric bypass 20 years ago, chronic TPN use, GERD, hypothyroidism who was admitted to hospital for sepsis secondary to Staph epi bacteremia, C diff colitis  GI is consulted for PEG tube placement for severe malnutrition  1  Severe protein calorie malnutrition  EGD with successful placement of PEJ tube  Appears functional   Patient asymptomatic  Flushes appropriately  Bumper at 1 5  • Okay to start tube feed for nutrition  • Continue oral diet as tolerated  • Continue local wound care     2  C diff colitis  Patient has multiple episodes of diarrhea early in admission  CT scan showed evidence of colitis  · Appreciate infectious disease input  Continue p o  vancomycin per ID  · Monitor bowel movements, WBC count  3  Anemia  EGD showed marginal ulcer near gastrojejunal anastomosis  Nonbleeding  Hemoglobin stable at 8 1  Status post 1 unit PRBCs  No evidence of further bleeding  · Continue PPI 40 mg q 12, can change to p o    · Continue Carafate q i d   · Monitor hemoglobin transfuse for less than 7  Rest of care per primary team     ______________________________________________________________________    Subjective:  Seen and examined lying in bed in no acute distress  Denies any abdominal pain, nausea, vomiting  Slight pain around PEJ tube insertion site  REVIEW OF SYSTEMS:    Review of Systems   Constitutional: Negative for chills and fever  HENT: Negative for congestion and sinus pressure  Respiratory: Negative for cough and shortness of breath  Cardiovascular: Negative for chest pain, palpitations and leg swelling  Gastrointestinal: Negative for abdominal pain, constipation, diarrhea, nausea and vomiting     Genitourinary: Negative for dysuria and hematuria  Musculoskeletal: Negative for arthralgias and back pain  Skin: Negative for color change and rash  Neurological: Negative for dizziness and headaches  Psychiatric/Behavioral: Positive for confusion  Negative for agitation  All other systems reviewed and are negative           Historical Information   Past Medical History:   Diagnosis Date   • Anemia    • Anxiety    • Bipolar disorder ([facility] [address] )    • Colon polyp    • Disease of thyroid gland    • Essential hypertension    • Essential tremor    • Fibromyalgia, primary    • GERD (gastroesophageal reflux disease)    • Inflammatory polyarthropathy (HCC)    • Mammogram abnormal    • Pressure injury of skin      Past Surgical History:   Procedure Laterality Date   • BREAST BIOPSY Right 2015    benign   • CARPAL TUNNEL RELEASE Bilateral    • COLONOSCOPY     • EGD AND COLONOSCOPY  2014   • GASTRIC BYPASS  2012   • MAMMO NEEDLE LOCALIZATION RIGHT (ALL INC) Right 2012   • MAMMO NEEDLE LOCALIZATION RIGHT (ALL INC) Right 2012   • ULNAR NERVE TRANSPOSITION Right      Social History   Social History     Substance and Sexual Activity   Alcohol Use Yes   • Alcohol/week: 4 0 standard drinks   • Types: 4 Glasses of wine per week    Comment: rare     Social History     Substance and Sexual Activity   Drug Use Never     Social History     Tobacco Use   Smoking Status Former Smoker   • Quit date:    • Years since quittin 7   Smokeless Tobacco Never Used     Family History   Problem Relation Age of Onset   • No Known Problems Mother    • No Known Problems Father    • No Known Problems Sister    • No Known Problems Maternal Grandmother    • No Known Problems Maternal Grandfather    • No Known Problems Paternal Grandmother    • No Known Problems Paternal Grandfather    • No Known Problems Sister    • No Known Problems Sister    • Stomach cancer Maternal Aunt    • No Known Problems Maternal Aunt    • No Known Problems Maternal Aunt    • No Known Problems Maternal Aunt    • No Known Problems Paternal Aunt    • Leukemia Other        Meds/Allergies     Medications Prior to Admission   Medication   • clonazePAM (KlonoPIN) 1 mg tablet   • folic acid (FOLVITE) 1 mg tablet   • levothyroxine 112 mcg tablet   • midodrine (PROAMATINE) 5 mg tablet   • sucralfate (CARAFATE) 1 g tablet   • thiamine (VITAMIN B1) 100 mg tablet   • traZODone (DESYREL) 50 mg tablet     Current Facility-Administered Medications   Medication Dose Route Frequency   • acetaminophen (TYLENOL) tablet 650 mg  650 mg Oral Q6H PRN   • ALPRAZolam (XANAX) tablet 0 25 mg  0 25 mg Oral TID PRN   • alteplase (CATHFLO) injection 2 mg  2 mg Intracatheter Once   • clonazePAM (KlonoPIN) tablet 3 mg  3 mg Oral HS   • folic acid (FOLVITE) tablet 1 mg  1 mg Oral Daily   • heparin (porcine) subcutaneous injection 5,000 Units  5,000 Units Subcutaneous Q8H [facility]   • levothyroxine tablet 112 mcg  112 mcg Oral Early Morning   • midodrine (PROAMATINE) tablet 5 mg  5 mg Oral TID AC   • ondansetron (ZOFRAN) injection 4 mg  4 mg Intravenous Q6H PRN   • pantoprazole (PROTONIX) injection 40 mg  40 mg Intravenous Q12H [facility]   • sucralfate (CARAFATE) tablet 1 g  1 g Oral 4x Daily (AC & HS)   • thiamine tablet 100 mg  100 mg Oral Daily   • traZODone (DESYREL) tablet 150 mg  150 mg Oral HS   • vancomycin (VANCOCIN) IVPB (premix in dextrose) 750 mg 150 mL  15 mg/kg Intravenous Daily PRN   • vancomycin (VANCOCIN) oral solution 125 mg  125 mg Oral Q6H [facility]       No Known Allergies        Objective     Blood pressure 101/56, pulse 71, temperature (!) 96 5 °F (35 8 °C), temperature source Axillary, resp  rate 18, height 5' 4" (1 626 m), weight 53 kg (116 lb 13 5 oz), SpO2 95 %  Body mass index is 20 06 kg/m²        Intake/Output Summary (Last 24 hours) at 9/9/2022 0931  Last data filed at 9/8/2022 1607  Gross per 24 hour   Intake 300 ml   Output --   Net 300 ml         PHYSICAL EXAM:      Physical Exam  Vitals and nursing note reviewed  Constitutional:       General: She is not in acute distress  Appearance: Normal appearance  She is well-developed  She is cachectic  She is ill-appearing  HENT:      Head: Normocephalic and atraumatic  Mouth/Throat:      Mouth: Mucous membranes are moist    Eyes:      Extraocular Movements: Extraocular movements intact  Conjunctiva/sclera: Conjunctivae normal       Pupils: Pupils are equal, round, and reactive to light  Cardiovascular:      Rate and Rhythm: Normal rate and regular rhythm  Pulses: Normal pulses  Heart sounds: Normal heart sounds  No murmur heard  No friction rub  No gallop  Pulmonary:      Effort: Pulmonary effort is normal  No respiratory distress  Breath sounds: Normal breath sounds  No wheezing or rales  Abdominal:      General: Abdomen is flat  Bowel sounds are normal  There is no distension  Palpations: Abdomen is soft  Tenderness: There is no abdominal tenderness  There is no guarding  Comments: PEJ tube site clean and intact  No tenderness around site  Flushed with water  Clear  Musculoskeletal:      Cervical back: Neck supple  Right lower leg: No edema  Left lower leg: No edema  Skin:     General: Skin is warm and dry  Neurological:      General: No focal deficit present  Mental Status: She is alert  She is disoriented  Psychiatric:         Mood and Affect: Mood normal          Behavior: Behavior normal           Lab Results:   No results displayed because visit has over 200 results  Imaging Studies: I have personally reviewed pertinent imaging studies  [address] [facility]    Gastroenterology Fellow  PGY-4  Available via [employer]  9/9/2022 9:59 AM

## 2022-09-09 NOTE — ASSESSMENT & PLAN NOTE
· Recent hx of staph epi bacteremia in 7/2022, presumed due to picc line  · Now with possible recurrence 1/2 set staph epi, 2nd set no growth thus far  · ID following  · Had been on IV vancomycin but discontinued due to fluctuating levels  Thus was transitioned to IV daptomycin to continue through 9/15  · Discontinued presenting PICC  Ordered for new PICC today  · S/p TAVIA (9/9) no evidence of vegetation  · Repeat blood cx neg  · TPN discontinued   S/p PEG placement

## 2022-09-09 NOTE — CONSULTS
Vancomycin IV Pharmacy-to-Dose Consultation    Joyce Reyes is a 76 y o  female who was receiving Vancomycin IV with management by the Pharmacy Consult service for treatment of bacteremia  The patient’s Vancomycin therapy has been discontinued  Thank you for allowing us to take part in this patient's care  Pharmacy will sign-off now; please call or re-consult if there are any questions      Missy Moise, Pharmacist

## 2022-09-09 NOTE — ASSESSMENT & PLAN NOTE
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

## 2022-09-09 NOTE — PROGRESS NOTES
[address] [facility]  Progress Note - Bernabe Linda 1945, 76 y o  female MRN: [id number]  Unit/Bed#: [facility] 317-01 Encounter: [id number]  Primary Care Provider: Soto Hdz MD   Date and time admitted to hospital: 8/30/2022 11:49 PM    * Sepsis [facility])  Assessment & Plan  · POA; Met SIRs criteria with fever, leukocytosis   · Source with bacteremia, C diff colitis  · Infectious disease following  · + evidence of colitis on ct scan  Status post C diff panel; positive toxin PCR but negative EIA  On p o  Vancomycin through 9/18  Diarrhea improved off rectal tube  · Positive recurrent bacteremia  Presumed secondary to PICC line with use of TPN  Prior history of recent MSSE  · Repeat blood cx neg thus far  · Discontinued presenting PICC  New PICC ordered (9/9)  · Had been on IV vanco however difficulty managing levels thus transitioned to IV daptomycin through 9/15  · Ur cx polymicrobial but non significant colony ct  Treatment not indicated  · Cont to trend wbc      Bacteremia  Assessment & Plan  · Recent hx of staph epi bacteremia in 7/2022, presumed due to picc line  · Now with possible recurrence 1/2 set staph epi, 2nd set no growth thus far  · ID following  · Had been on IV vancomycin but discontinued due to fluctuating levels  Thus was transitioned to IV daptomycin to continue through 9/15  · Discontinued presenting PICC  Ordered for new PICC today  · S/p TAVIA (9/9) no evidence of vegetation  · Repeat blood cx neg  · TPN discontinued  S/p PEG placement      H/O bariatric surgery - bypass  Assessment & Plan  · Patient with history of gastric bypass surgery  Has had significant malnutrition issues and anastomotic ulcerations  Seen by Bariatric Service during recent hospitalization at [facility] who recommended prolonged course of TPN for nutritional optimization and possible revision of surgery in the future    · Was planned to have appt with bariatric sx on 8/20 however had to be rescheduled  · Given recurrent bacteremia; s/p GI consult; recommended discontinuation of TPN  S/p PEG, TF initiated, Await to reach goal  · On PO diet  Tolerate as pt able  · To f/u with bariatric surgery      Acute on chronic anemia  Assessment & Plan  · GI on board  · 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  · S/p recent EGD in 7/22 revealing: Residual marginal ulcer of the gastrojejunostomy anastomosis with improved size  · S/p EGD 9/2 revealing: Large, cratered ulcer in the gastrojejunal anastomosis   · Received PRBC transfusion x 1   · Cont PPI BID, carafate  · H/H stable    Moderate protein-calorie malnutrition ([address] )  Assessment & Plan  Malnutrition Findings:   Adult Malnutrition type: Chronic illness  Adult Degree of Malnutrition: Other severe protein calorie malnutrition  Malnutrition Characteristics: Weight loss, Muscle loss                  360 Statement: Severe/Chronic malnutrition r/t condition, as evidenced by 4 8% wt loss x < 1 week (9/2/22: 125#, 9/5/22: 119#), and severe muscle mass depletion (temples/clavicle)  Treated with liberalized diet and nutrition supplements, possibly nutrition support pending goals of care    BMI Findings: Body mass index is 19 91 kg/m²  Hx of Savage-en Y gastric bypass, 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  Now off due to recurrent bacteremia  S/p PEG placement and on PO feeds  Cont nutritional supplements    Goals of care, counseling/discussion  Assessment & Plan  · Palliative on board  S/p family mtg patient has had a change of heart and wants to be txt focused and proceed with PEG placement   She states that she realized that she is not ready to die  · She remains full code      Acute encephalopathy  Assessment & Plan  · Secondary to hospital induced delirium vs metabolic encephalopathy  · No focal deficit on exam  · Delirium precautions  · Improved MS    Ambulatory dysfunction  Assessment & Plan  · Exacerbated by diarrhea/dehydration however patient is chronically malnourished  · S/p PT/OT evaluation; to return to SNF  · Family/PT wishing alternate SNF  CM on board to find replacement    VTE Pharmacologic Prophylaxis:   Pharmacologic: Heparin  Mechanical VTE Prophylaxis in Place: No    Patient Centered Rounds: I have performed bedside rounds with nursing staff today  Discussions with Specialists or Other Care Team Provider:  Infectious disease    Education and Discussions with Family / Patient:  Patient and called her sister Bethany Mcarthur    Time Spent for Care: 30 minutes  More than 50% of total time spent on counseling and coordination of care as described above  Current Length of Stay: 9 day(s)    Current Patient Status: Inpatient   Certification Statement: The patient will continue to require additional inpatient hospital stay due to pending placement    Discharge Plan: to SNF, family wishing alternate SNF closer to family    Code Status: Level 1 - Full Code      Subjective:   Status post CE  Well-tolerated  No acute events overnight  Diarrhea improved    Objective:     Vitals:   Temp (24hrs), Av 5 °F (35 8 °C), Min:96 5 °F (35 8 °C), Max:96 5 °F (35 8 °C)    Temp:  [96 5 °F (35 8 °C)] 96 5 °F (35 8 °C)  HR:  [69-76] 76  Resp:  [18] 18  BP: (101-115)/(56-60) 105/60  SpO2:  [95 %-97 %] 97 %  Body mass index is 19 91 kg/m²  Input and Output Summary (last 24 hours): Intake/Output Summary (Last 24 hours) at 2022 1917  Last data filed at 2022 1149  Gross per 24 hour   Intake 400 ml   Output --   Net 400 ml       Physical Exam:     Physical Exam  Constitutional:       Comments: Cachectic   Cardiovascular:      Rate and Rhythm: Normal rate and regular rhythm  Pulses: Normal pulses  Heart sounds: Normal heart sounds  Pulmonary:      Effort: Pulmonary effort is normal  No respiratory distress  Breath sounds: Normal breath sounds  No wheezing or rales  Abdominal:      General: Abdomen is flat   Bowel sounds are normal  There is no distension  Palpations: Abdomen is soft  Tenderness: There is no abdominal tenderness  There is no guarding  Comments: Peg in place   Musculoskeletal:         General: Normal range of motion  Right lower leg: No edema  Left lower leg: No edema  Skin:     General: Skin is warm and dry  Neurological:      General: No focal deficit present  Mental Status: She is alert and oriented to person, place, and time  Mental status is at baseline  Cranial Nerves: No cranial nerve deficit  Motor: No weakness  Additional Data:     Labs:    Results from last 7 days   Lab Units 09/09/22  0637 09/08/22  0551 09/06/22  0439   WBC Thousand/uL 11 85*   < > 13 91*   HEMOGLOBIN g/dL 8 1*   < > 8 7*   HEMATOCRIT % 26 6*   < > 28 0*   PLATELETS Thousands/uL 441*   < > 511*   BANDS PCT %  --   --  11*   LYMPHO PCT %  --   --  8*   MONO PCT %  --   --  4   EOS PCT %  --   --  6    < > = values in this interval not displayed  Results from last 7 days   Lab Units 09/08/22  0551 09/06/22  0439   SODIUM mmol/L 133* 135   POTASSIUM mmol/L 3 6 4 0   CHLORIDE mmol/L 104 107   CO2 mmol/L 24 23   BUN mg/dL 13 16   CREATININE mg/dL 1 51* 1 32*   ANION GAP mmol/L 5 5   CALCIUM mg/dL 8 7 8 8   ALBUMIN g/dL  --  1 7*   TOTAL BILIRUBIN mg/dL  --  0 40   ALK PHOS U/L  --  148*   ALT U/L  --  66   AST U/L  --  50*   GLUCOSE RANDOM mg/dL 90 86         Results from last 7 days   Lab Units 09/09/22  1646 09/09/22  0645 09/09/22  0047 09/08/22  1114 09/08/22  0543 09/08/22  0119 09/07/22  1244 09/07/22  0608 09/06/22  2045 09/06/22  1602 09/06/22  1109 09/06/22  0620   POC GLUCOSE mg/dl 88 98 105 94 96 96 153* 95 119 105 128 104                   * I Have Reviewed All Lab Data Listed Above  * Additional Pertinent Lab Tests Reviewed:  All Labs Within Last 24 Hours Reviewed    Imaging:    Imaging Reports Reviewed Today Include:   Imaging Personally Reviewed by Myself Includes: Recent Cultures (last 7 days):           Last 24 Hours Medication List:   Current Facility-Administered Medications   Medication Dose Route Frequency Provider Last Rate   • acetaminophen  650 mg Oral Q6H PRN Jovitakash Mcdermott, DO     • ALPRAZolam  0 25 mg Oral TID PRN Ramy Dickerson DO     • alteplase  2 mg Intracatheter Once [facility] YNES Sevilla     • clonazePAM  3 mg Oral HS Jovitakash Mcdermott, DO     • DAPTOmycin  6 mg/kg Intravenous Q48H Guzman Cruz  mg (13/17/19 5062)   • folic acid  1 mg Oral Daily Jovita Mcdermott, DO     • heparin (porcine)  5,000 Units Subcutaneous Q8H [address] Kaylan Arroyo DO     • levothyroxine  112 mcg Oral Early Morning Jovita Baldemar, DO     • midodrine  5 mg Oral TID AC Jovitakash Mcdermott, DO     • ondansetron  4 mg Intravenous Q6H PRN Jovitakash Mcdermott DO     • [START ON 9/10/2022] pantoprazole  40 mg Oral BID AC Kaylan Arroyo DO     • sucralfate  1 g Oral 4x Daily (AC & HS) Ramy Dickerson DO     • thiamine  100 mg Oral Daily Jovitakash Mcdermott, DO     • traZODone  150 mg Oral HS Jovitakash Mcdermott, DO     • vancomycin  125 mg Oral Q6H [address] Lety Winters DO          Today, Patient Was Seen By: Ramy Dickerson DO    ** Please Note: Dictation voice to text software may have been used in the creation of this document   **

## 2022-09-09 NOTE — ASSESSMENT & PLAN NOTE
· Patient with history of gastric bypass surgery  Has had significant malnutrition issues and anastomotic ulcerations  Seen by Bariatric Service during recent hospitalization at [facility] who recommended prolonged course of TPN for nutritional optimization and possible revision of surgery in the future  · Was planned to have appt with bariatric sx on 8/20 however had to be rescheduled  · Given recurrent bacteremia; s/p GI consult; recommended discontinuation of TPN  S/p PEG, TF initiated, Await to reach goal  · On PO diet   Tolerate as pt able  · To f/u with bariatric surgery

## 2022-09-09 NOTE — PROCEDURES
Insert PICC line    Date/Time: 9/9/2022 3:10 PM  Performed by: Sindi Razo RN  Authorized by: Leeann Galloway DO     Patient location:  Bedside  Other Assisting Provider: Yes (comment)    Consent:     Consent obtained:  Written (physician obtained consent)    Consent given by:  Patient    Procedural risks discussed: MD instructed  Universal protocol:     Procedure explained and questions answered to patient or proxy's satisfaction: yes      Relevant documents present and verified: yes      Test results available and properly labeled: yes      Radiology Images displayed and confirmed  If images not available, report reviewed: yes      Required blood products, implants, devices, and special equipment available: yes      Site/side marked: yes      Immediately prior to procedure, a time out was called: yes      Patient identity confirmed:  Verbally with patient and arm band  Pre-procedure details:     Hand hygiene: Hand hygiene performed prior to insertion      Sterile barrier technique: All elements of maximal sterile technique followed      Skin preparation:  ChloraPrep    Skin preparation agent: Skin preparation agent completely dried prior to procedure    Indications:     PICC line indications: long term antibiotics    Sedation:     Sedation type: Other (comment)  Anesthesia (see MAR for exact dosages):      Anesthesia method:  Local infiltration    Local anesthetic:  Lidocaine 1% w/o epi (2 ml)  Procedure details:     Location:  Brachial    Vessel type: vein      Laterality:  Right    Approach: percutaneous technique used      Patient position:  Flat    Procedural supplies:  Double lumen    Catheter size:  5 Fr    Landmarks identified: yes      Ultrasound guidance: yes      Ultrasound image availability:  Not saved    Sterile ultrasound techniques: Sterile gel and sterile probe covers were used      Number of attempts:  1    Successful placement: yes      Vessel of catheter tip end:  Sherlock 3CG confirmed (ok to use, reported to RN)    Total catheter length (cm):  39    Catheter out on skin (cm):  0    Max flow rate:  999 ml / hr    Arm circumference:  24  Post-procedure details:     Post-procedure:  Dressing applied and securement device placed    Assessment:  Blood return through all ports and free fluid flow    Post-procedure complications: none      Patient tolerance of procedure:   Tolerated well, no immediate complications    Observer: Yes      Observer name:  Dania Grijalva, infusion tech

## 2022-09-10 NOTE — PLAN OF CARE
Problem: Potential for Falls  Goal: Patient will remain free of falls  Description: INTERVENTIONS:  - Educate patient/family on patient safety including physical limitations  - Instruct patient to call for assistance with activity   - Consult OT/PT to assist with strengthening/mobility   - Keep Call bell within reach  - Keep bed low and locked with side rails adjusted as appropriate  - Keep care items and personal belongings within reach  - Initiate and maintain comfort rounds  - Make Fall Risk Sign visible to staff  - Offer Toileting every  Hours, in advance of need  - Initiate/Maintain alarm  - Obtain necessary fall risk management equipment:   - Apply yellow socks and bracelet for high fall risk patients  - Consider moving patient to room near nurses station  Outcome: Progressing     Problem: SAFETY ADULT  Goal: Patient will remain free of falls  Description: INTERVENTIONS:  - Educate patient/family on patient safety including physical limitations  - Instruct patient to call for assistance with activity   - Consult OT/PT to assist with strengthening/mobility   - Keep Call bell within reach  - Keep bed low and locked with side rails adjusted as appropriate  - Keep care items and personal belongings within reach  - Initiate and maintain comfort rounds  - Make Fall Risk Sign visible to staff  - Offer Toileting every  Hours, in advance of need  - Initiate/Maintain alarm  - Obtain necessary fall risk management equipment:   - Apply yellow socks and bracelet for high fall risk patients  - Consider moving patient to room near nurses station  Outcome: Progressing     Problem: Prexisting or High Potential for Compromised Skin Integrity  Goal: Skin integrity is maintained or improved  Description: INTERVENTIONS:  - Identify patients at risk for skin breakdown  - Assess and monitor skin integrity  - Assess and monitor nutrition and hydration status  - Monitor labs   - Assess for incontinence   - Turn and reposition patient  - Assist with mobility/ambulation  - Relieve pressure over bony prominences  - Avoid friction and shearing  - Provide appropriate hygiene as needed including keeping skin clean and dry  - Evaluate need for skin moisturizer/barrier cream  - Collaborate with interdisciplinary team   - Patient/family teaching  - Consider wound care consult   Outcome: Progressing     Problem: Nutrition/Hydration-ADULT  Goal: Nutrient/Hydration intake appropriate for improving, restoring or maintaining nutritional needs  Description: Monitor and assess patient's nutrition/hydration status for malnutrition  Collaborate with interdisciplinary team and initiate plan and interventions as ordered  Monitor patient's weight and dietary intake as ordered or per policy  Utilize nutrition screening tool and intervene as necessary  Determine patient's food preferences and provide high-protein, high-caloric foods as appropriate       INTERVENTIONS:  - Monitor oral intake, urinary output, labs, and treatment plans  - Assess nutrition and hydration status and recommend course of action  - Evaluate amount of meals eaten  - Assist patient with eating if necessary   - Allow adequate time for meals  - Recommend/ encourage appropriate diets, oral nutritional supplements, and vitamin/mineral supplements  - Order, calculate, and assess calorie counts as needed  - Recommend, monitor, and adjust tube feedings and TPN/PPN based on assessed needs  - Assess need for intravenous fluids  - Provide specific nutrition/hydration education as appropriate  - Include patient/family/caregiver in decisions related to nutrition  Outcome: Progressing

## 2022-09-10 NOTE — PROGRESS NOTES
[facility]  Progress Note - Spring Iverson 1945, 76 y o  female MRN: [id number]  Unit/Bed#: [facility] 317-01 Encounter: [id number]  Primary Care Provider: Perry Barcenas MD   Date and time admitted to hospital: 8/30/2022 11:49 PM    * Sepsis [facility])  Assessment & Plan  · Sepsis resolved with normal wbc  No fever   · Source with bacteremia, C diff colitis  ID appreciated  · + evidence of colitis on ct scan  Status post C diff panel; positive toxin PCR but negative EIA  On p o  Vancomycin through 9/18  Diarrhea improved off rectal tube  · Positive recurrent bacteremia  Presumed secondary to PICC line with use of TPN  Prior history of recent MSSE  · Repeat blood cx neg thus far  · Discontinued presenting PICC  New PICC ordered (9/9)  · Had been on IV vanco however difficulty managing levels thus transitioned to IV daptomycin through 9/15  · Ur cx polymicrobial but non significant colony ct  Treatment not indicated  · Cont to trend wbc      Bacteremia  Assessment & Plan  · Recent hx of staph epi bacteremia in 7/2022, presumed due to picc line  · Now with possible recurrence 1/2 set staph epi, 2nd set no growth thus far  · ID following  · Had been on IV vancomycin but discontinued due to fluctuating levels  Thus was transitioned to IV daptomycin to continue through 9/15  · Discontinued presenting PICC  Ordered for new PICC today  · S/p TAVIA (9/9) no evidence of vegetation  · Repeat blood cx neg x 5 days   · TPN discontinued   S/p PEG placement      Acute encephalopathy  Assessment & Plan  · Secondary to hospital induced delirium vs metabolic encephalopathy  · No focal deficit on exam  · Delirium precautions  · Improved MS    Acute on chronic anemia  Assessment & Plan  · GI on board  · 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  · S/p recent EGD in 7/22 revealing: Residual marginal ulcer of the gastrojejunostomy anastomosis with improved size  · S/p EGD 9/2 revealing: Large, cratered ulcer in the gastrojejunal anastomosis   · Received PRBC transfusion x 1   · Cont PPI BID, carafate  · H/H stable    Ambulatory dysfunction  Assessment & Plan  · Exacerbated by diarrhea/dehydration however patient is chronically malnourished  · S/p PT/OT evaluation; to return to SNF  · Family/PT wishing alternate SNF  CM on board to find replacement    Goals of care, counseling/discussion  Assessment & Plan  · Palliative on board  S/p family mtg patient has had a change of heart and wants to be txt focused and proceed with PEG placement  She states that she realized that she is not ready to die  · She remains full code      Moderate protein-calorie malnutrition ([address] )  Assessment & Plan  Malnutrition Findings:   Adult Malnutrition type: Chronic illness  Adult Degree of Malnutrition: Other severe protein calorie malnutrition  Malnutrition Characteristics: Weight loss, Muscle loss                  360 Statement: Severe/Chronic malnutrition r/t condition, as evidenced by 4 8% wt loss x < 1 week (9/2/22: 125#, 9/5/22: 119#), and severe muscle mass depletion (temples/clavicle)  Treated with liberalized diet and nutrition supplements, possibly nutrition support pending goals of care    BMI Findings: Body mass index is 20 43 kg/m²  Hx of Savage-en Y gastric bypass, 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  Now off due to recurrent bacteremia  S/p PEG placement and on PO feeds  Cont nutritional supplements    H/O bariatric surgery - bypass  Assessment & Plan  · Patient with history of gastric bypass surgery  Has had significant malnutrition issues and anastomotic ulcerations  Seen by Bariatric Service during recent hospitalization at [facility] who recommended prolonged course of TPN for nutritional optimization and possible revision of surgery in the future    · Was planned to have appt with bariatric sx on 8/20 however had to be rescheduled  · Given recurrent bacteremia; s/p GI consult; recommended discontinuation of TPN  S/p PEG, TF initiated, Await to reach goal  · On PO diet  Tolerate as pt able  · To f/u with bariatric surgery            VTE Pharmacologic Prophylaxis: VTE Score: 3 Low Risk (Score 0-2) - Encourage Ambulation  Patient Centered Rounds: Nurse updated  Discussions with Specialists or Other Care Team Provider: Nurse    Education and Discussions with Family / Patient: Updated  (sister) via phone  Time Spent for Care: 30 minutes  More than 50% of total time spent on counseling and coordination of care as described above  Current Length of Stay: 10 day(s)  Current Patient Status: Inpatient   Certification Statement: The patient will continue to require additional inpatient hospital stay due to TF initiation with goal still to be actualized   Discharge Plan: Anticipate discharge in >72 hrs to discharge location to be determined pending rehab evaluations  Code Status: Level 1 - Full Code    Subjective:   Complaint of uncontrolled pain     Objective:     Vitals:   Temp (24hrs), Av 4 °F (36 9 °C), Min:98 1 °F (36 7 °C), Max:98 7 °F (37 1 °C)    Temp:  [98 1 °F (36 7 °C)-98 7 °F (37 1 °C)] 98 1 °F (36 7 °C)  HR:  [72-81] 72  Resp:  [17] 17  BP: (101-113)/(59-60) 101/60  SpO2:  [93 %] 93 %  Body mass index is 20 43 kg/m²  Input and Output Summary (last 24 hours):   No intake or output data in the 24 hours ending 09/10/22 190    Physical Exam:   Physical Exam  Constitutional:       Comments: Cachectic   Cardiovascular:      Rate and Rhythm: Normal rate and regular rhythm  Pulses: Normal pulses  Heart sounds: Normal heart sounds  Pulmonary:      Effort: Pulmonary effort is normal  No respiratory distress  Breath sounds: Normal breath sounds  No wheezing or rales  Abdominal:      General: Abdomen is flat  Bowel sounds are normal  There is no distension  Palpations: Abdomen is soft  Tenderness: There is no abdominal tenderness   There is no guarding  Comments: Peg in place   Musculoskeletal:         General: Normal range of motion  Right lower leg: No edema  Left lower leg: No edema  Skin:     General: Skin is warm and dry  Neurological:      General: No focal deficit present  Mental Status: She is alert and oriented to person, place, and time  Mental status is at baseline  Cranial Nerves: No cranial nerve deficit  Motor: No weakness          Additional Data:     Labs:  Results from last 7 days   Lab Units 09/10/22  0605 09/08/22  0551 09/06/22  0439   WBC Thousand/uL 11 80*   < > 13 91*   HEMOGLOBIN g/dL 7 8*   < > 8 7*   HEMATOCRIT % 25 2*   < > 28 0*   PLATELETS Thousands/uL 449*   < > 511*   BANDS PCT %  --   --  11*   LYMPHO PCT %  --   --  8*   MONO PCT %  --   --  4   EOS PCT %  --   --  6    < > = values in this interval not displayed       Results from last 7 days   Lab Units 09/10/22  0605   SODIUM mmol/L 136   POTASSIUM mmol/L 3 8   CHLORIDE mmol/L 107   CO2 mmol/L 23   BUN mg/dL 15   CREATININE mg/dL 1 61*   ANION GAP mmol/L 6   CALCIUM mg/dL 8 0*   ALBUMIN g/dL 1 6*   TOTAL BILIRUBIN mg/dL 0 15*   ALK PHOS U/L 161*   ALT U/L 23   AST U/L 31   GLUCOSE RANDOM mg/dL 102         Results from last 7 days   Lab Units 09/10/22  1715 09/10/22  1133 09/10/22  0551 09/09/22  2045 09/09/22  1646 09/09/22  0645 09/09/22  0047 09/08/22  1114 09/08/22  0543 09/08/22  0119 09/07/22  1244 09/07/22  0608   POC GLUCOSE mg/dl 103 103 128 151* 88 98 105 94 96 96 153* 95               Lines/Drains:  Invasive Devices  Report    Peripherally Inserted Central Catheter Line  Duration           PICC Line 09/09/22 Right Brachial 1 day          Peripheral Intravenous Line  Duration           Peripheral IV 09/07/22 Left;Proximal;Ventral (anterior) Forearm 3 days          Drain  Duration           External Urinary Catheter 35 days    Rectal Tube With balloon 10 days    Gastrostomy/Enterostomy Percutaneous Endoscopic Gastrostomy (PEG) 20 Fr  LUQ 2 days                Central Line:  Goal for removal: Will discontinue when hemodynamically stable  Imaging: No pertinent imaging reviewed  Recent Cultures (last 7 days):         Last 24 Hours Medication List:   Current Facility-Administered Medications   Medication Dose Route Frequency Provider Last Rate   • acetaminophen  650 mg Oral Q6H PRN Jovita Mcdermott, DO     • ALPRAZolam  0 25 mg Oral TID PRN Tommy Shawa, DO     • alteplase  2 mg Intracatheter Once Marlin Vicente PA-C     • clonazePAM  3 mg Oral HS Jovita Mcdermott, DO     • DAPTOmycin  6 mg/kg Intravenous Q48H Claudia Hackett  mg (80/81/07 5920)   • folic acid  1 mg Oral Daily Jovita Mcdermott, DO     • heparin (porcine)  5,000 Units Subcutaneous Q8H [facility] Cruz, DO     • levothyroxine  112 mcg Oral Early Morning [facility]     • midodrine  5 mg Oral TID AC Jovita Baldemar, DO     • ondansetron  4 mg Intravenous Q6H PRN Jovitakash Mcdermott, DO     • pantoprazole  40 mg Oral BID AC [facility] Cruz DO     • sucralfate  1 g Oral 4x Daily (AC & HS) Tommy Juarez, DO     • thiamine  100 mg Oral Daily Jovita Mcdermott, DO     • traZODone  150 mg Oral HS [facility], DO     • vancomycin  125 mg Oral Q6H [facility] Luigi May DO          Today, Patient Was Seen By: Wade Monzon MD    **Please Note: This note may have been constructed using a voice recognition system  **

## 2022-09-10 NOTE — ASSESSMENT & PLAN NOTE
· Sepsis resolved with normal wbc  No fever   · Source with bacteremia, C diff colitis  ID appreciated  · + evidence of colitis on ct scan  Status post C diff panel; positive toxin PCR but negative EIA  On p o  Vancomycin through 9/18  Diarrhea improved off rectal tube  · Positive recurrent bacteremia  Presumed secondary to PICC line with use of TPN  Prior history of recent MSSE  · Repeat blood cx neg thus far  · Discontinued presenting PICC  New PICC ordered (9/9)  · Had been on IV vanco however difficulty managing levels thus transitioned to IV daptomycin through 9/15  · Ur cx polymicrobial but non significant colony ct   Treatment not indicated  · Cont to trend wbc

## 2022-09-10 NOTE — ASSESSMENT & PLAN NOTE
Malnutrition Findings:   Adult Malnutrition type: Chronic illness  Adult Degree of Malnutrition: Other severe protein calorie malnutrition  Malnutrition Characteristics: Weight loss, Muscle loss                  360 Statement: Severe/Chronic malnutrition r/t condition, as evidenced by 4 8% wt loss x < 1 week (9/2/22: 125#, 9/5/22: 119#), and severe muscle mass depletion (temples/clavicle)  Treated with liberalized diet and nutrition supplements, possibly nutrition support pending goals of care    BMI Findings: Body mass index is 20 43 kg/m²  Hx of Savage-en Y gastric bypass, 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

## 2022-09-10 NOTE — ASSESSMENT & PLAN NOTE
· Patient with history of gastric bypass surgery  Has had significant malnutrition issues and anastomotic ulcerations  Seen by Bariatric Service during recent hospitalization at [facility] who recommended prolonged course of TPN for nutritional optimization and possible revision of surgery in the future  · Was planned to have appt with bariatric sx on 8/20 however had to be rescheduled  · Given recurrent bacteremia; s/p GI consult; recommended discontinuation of TPN  S/p PEG, TF initiated, Await to reach goal  · On PO diet   Tolerate as pt able  · To f/u with bariatric surgery

## 2022-09-10 NOTE — ASSESSMENT & PLAN NOTE
· Recent hx of staph epi bacteremia in 7/2022, presumed due to picc line  · Now with possible recurrence 1/2 set staph epi, 2nd set no growth thus far  · ID following  · Had been on IV vancomycin but discontinued due to fluctuating levels  Thus was transitioned to IV daptomycin to continue through 9/15  · Discontinued presenting PICC  Ordered for new PICC today  · S/p TAVIA (9/9) no evidence of vegetation  · Repeat blood cx neg x 5 days   · TPN discontinued   S/p PEG placement

## 2022-09-11 NOTE — ASSESSMENT & PLAN NOTE
Malnutrition Findings:   Adult Malnutrition type: Chronic illness  Adult Degree of Malnutrition: Other severe protein calorie malnutrition  Malnutrition Characteristics: Weight loss, Muscle loss     On Jevity 1 2   Tolerating it well at goal of 69 cc/hr from 6 pm to 6am   Checked CMP and it shows hyponatremia of 130, low albumin but mildly elevated AST and A phos  Will obtain serum osmolality and will defer to nutrition for rec    360 Statement: Severe/Chronic malnutrition r/t condition, as evidenced by 4 8% wt loss x < 1 week (9/2/22: 125#, 9/5/22: 119#), and severe muscle mass depletion (temples/clavicle)  Treated with liberalized diet and nutrition supplements, possibly nutrition support pending goals of care    BMI Findings: Body mass index is 20 43 kg/m²  Hx of Savage-en Y gastric bypass, 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

## 2022-09-11 NOTE — PLAN OF CARE
Problem: Potential for Falls  Goal: Patient will remain free of falls  Description: INTERVENTIONS:  - Educate patient/family on patient safety including physical limitations  - Instruct patient to call for assistance with activity   - Consult OT/PT to assist with strengthening/mobility   - Keep Call bell within reach  - Keep bed low and locked with side rails adjusted as appropriate  - Keep care items and personal belongings within reach  - Initiate and maintain comfort rounds  - Make Fall Risk Sign visible to staff  - Offer Toileting every  Hours, in advance of need  - Initiate/Maintain alarm  - Obtain necessary fall risk management equipment:   - Apply yellow socks and bracelet for high fall risk patients  - Consider moving patient to room near nurses station  Outcome: Progressing     Problem: SAFETY ADULT  Goal: Patient will remain free of falls  Description: INTERVENTIONS:  - Educate patient/family on patient safety including physical limitations  - Instruct patient to call for assistance with activity   - Consult OT/PT to assist with strengthening/mobility   - Keep Call bell within reach  - Keep bed low and locked with side rails adjusted as appropriate  - Keep care items and personal belongings within reach  - Initiate and maintain comfort rounds  - Make Fall Risk Sign visible to staff  - Offer Toileting every  Hours, in advance of need  - Initiate/Maintain alarm  - Obtain necessary fall risk management equipment:   - Apply yellow socks and bracelet for high fall risk patients  - Consider moving patient to room near nurses station  Outcome: Progressing     Problem: Knowledge Deficit  Goal: Patient/family/caregiver demonstrates understanding of disease process, treatment plan, medications, and discharge instructions  Description: Complete learning assessment and assess knowledge base    Interventions:  - Provide teaching at level of understanding  - Provide teaching via preferred learning methods  Outcome: Progressing     Problem: Nutrition/Hydration-ADULT  Goal: Nutrient/Hydration intake appropriate for improving, restoring or maintaining nutritional needs  Description: Monitor and assess patient's nutrition/hydration status for malnutrition  Collaborate with interdisciplinary team and initiate plan and interventions as ordered  Monitor patient's weight and dietary intake as ordered or per policy  Utilize nutrition screening tool and intervene as necessary  Determine patient's food preferences and provide high-protein, high-caloric foods as appropriate       INTERVENTIONS:  - Monitor oral intake, urinary output, labs, and treatment plans  - Assess nutrition and hydration status and recommend course of action  - Evaluate amount of meals eaten  - Assist patient with eating if necessary   - Allow adequate time for meals  - Recommend/ encourage appropriate diets, oral nutritional supplements, and vitamin/mineral supplements  - Order, calculate, and assess calorie counts as needed  - Recommend, monitor, and adjust tube feedings and TPN/PPN based on assessed needs  - Assess need for intravenous fluids  - Provide specific nutrition/hydration education as appropriate  - Include patient/family/caregiver in decisions related to nutrition  Outcome: Progressing

## 2022-09-11 NOTE — ASSESSMENT & PLAN NOTE
· Palliative on board  S/p family mtg patient has had a change of heart and wants to be txt focused and proceed with PEG placement   She states that she realized that she is not ready to die  · She remains full code fair balance

## 2022-09-11 NOTE — ASSESSMENT & PLAN NOTE
· Sepsis resolved with normal wbc  No fever   · Source with bacteremia, C diff colitis  ID appreciated  · + evidence of colitis on ct scan  Status post C diff panel; positive toxin PCR but negative EIA  On p o  Vancomycin through 9/18  Diarrhea improved off rectal tube  · Positive recurrent bacteremia  Presumed secondary to PICC line with use of TPN  Prior history of recent MSSE  · Repeat blood cx neg thus far  · Discontinued presenting PICC  New PICC ordered (9/9) which will also be dc'ed after abx is completed  · Had been on IV vanco however difficulty managing levels thus transitioned to IV daptomycin through 9/15  · Ur cx polymicrobial but non significant colony ct   Treatment not indicated  · Cont to trend wbc

## 2022-09-11 NOTE — ASSESSMENT & PLAN NOTE
Malnutrition Findings:   Adult Malnutrition type: Chronic illness  Adult Degree of Malnutrition: Other severe protein calorie malnutrition  Malnutrition Characteristics: Weight loss, Muscle loss     On Jevity 1 2   Tolerating it well                  360 Statement: Severe/Chronic malnutrition r/t condition, as evidenced by 4 8% wt loss x < 1 week (9/2/22: 125#, 9/5/22: 119#), and severe muscle mass depletion (temples/clavicle)  Treated with liberalized diet and nutrition supplements, possibly nutrition support pending goals of care    BMI Findings: Body mass index is 20 43 kg/m²  Hx of Savage-en Y gastric bypass, 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

## 2022-09-11 NOTE — ASSESSMENT & PLAN NOTE
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

## 2022-09-11 NOTE — ASSESSMENT & PLAN NOTE
· Patient with history of gastric bypass surgery  Has had significant malnutrition issues and anastomotic ulcerations  Seen by Bariatric Service during recent hospitalization at [facility] who recommended prolonged course of TPN for nutritional optimization and possible revision of surgery in the future  · Was planned to have appt with bariatric sx on 8/20 however had to be rescheduled  · Given recurrent bacteremia; s/p GI consult; recommended discontinuation of TPN  S/p PEG, TF initiated, goal reached l  · On PO diet   Tolerate as pt able  · To f/u with bariatric surgery

## 2022-09-11 NOTE — ASSESSMENT & PLAN NOTE
· Resolved  · WBC Of 12 5 after PEG feed was started but no fever, chills, tachycardia or tachypnea  · Will obtain ESR  CRP and procalcitonin to help tailor need for antibacterial indication   · Initial source was bacteremia, C diff colitis  ID appreciated  · + evidence of colitis on ct scan  Status post C diff panel; positive toxin PCR but negative EIA  On p o  Vancomycin through 9/18  Diarrhea resolved  · Positive recurrent bacteremia  Presumed secondary to PICC line with use of TPN  Prior history of recent MSSE  · Repeat blood cx neg after 5 days  · Discontinued presenting PICC  New PICC ordered (9/9) which will also be dc'ed after abx is completed  · Had been on IV vanco however difficulty managing levels thus transitioned to IV daptomycin through 9/15  · Ur cx polymicrobial but non significant colony ct   Treatment not indicated  · Cont to trend wbc

## 2022-09-11 NOTE — ASSESSMENT & PLAN NOTE
· Recent hx of staph epi bacteremia in 7/2022, presumed due to picc line  · Now with possible recurrence 1/2 set staph epi, 2nd set no growth thus far  · ID following  · Had been on IV vancomycin but discontinued due to fluctuating levels  Thus was transitioned to IV daptomycin to continue through 9/15  · PICC access changed on 9/9/22  · S/p TAVIA (9/9) no evidence of vegetation  · Repeat blood cx neg x 5 days   · TPN discontinued   S/p PEG placement  · DC iv access before dc

## 2022-09-11 NOTE — ASSESSMENT & PLAN NOTE
· Recent hx of staph epi bacteremia in 7/2022, presumed due to picc line  · Now with possible recurrence 1/2 set staph epi, 2nd set no growth thus far  · ID following  · Had been on IV vancomycin but discontinued due to fluctuating levels  Thus was transitioned to IV daptomycin to continue through 9/15  · PICC access changed on 9/9/22  · S/p TAVIA (9/9) no evidence of vegetation  · Repeat blood cx neg x 5 days   · TPN discontinued   S/p PEG placement

## 2022-09-11 NOTE — ASSESSMENT & PLAN NOTE
· Patient with history of gastric bypass surgery  Has had significant malnutrition issues and anastomotic ulcerations  Seen by Bariatric Service during recent hospitalization at [facility] who recommended prolonged course of TPN for nutritional optimization and possible revision of surgery in the future  · Was planned to have appt with bariatric sx on 8/20 however had to be rescheduled  · Given recurrent bacteremia; s/p GI consult; recommended discontinuation of TPN  S/p PEG, TF initiated, Await to reach goal  · On PO diet   Tolerate as pt able  · To f/u with bariatric surgery

## 2022-09-11 NOTE — PROGRESS NOTES
[facility]  Progress Note - Andree Roberts 1945, 76 y o  female MRN: [id number]  Unit/Bed#: [facility] 317-01 Encounter: [id number]  Primary Care Provider: Shelley Hannah MD   Date and time admitted to hospital: 8/30/2022 11:49 PM    * Sepsis [facility])  Assessment & Plan  · Sepsis resolved with improving wbc  No fever   · Source with bacteremia, C diff colitis  ID appreciated  · + evidence of colitis on ct scan  Status post C diff panel; positive toxin PCR but negative EIA  On p o  Vancomycin through 9/18  Diarrhea improved off rectal tube  · Positive recurrent bacteremia  Presumed secondary to PICC line with use of TPN  Prior history of recent MSSE  · Repeat blood cx neg thus far  · Discontinued presenting PICC  New PICC ordered (9/9) which will also be dc'ed after abx is completed  · Had been on IV vanco however difficulty managing levels thus transitioned to IV daptomycin through 9/15  · Ur cx polymicrobial but non significant colony ct  Treatment not indicated  · Cont to trend wbc      Bacteremia  Assessment & Plan  · Recent hx of staph epi bacteremia in 7/2022, presumed due to picc line  · Now with possible recurrence 1/2 set staph epi, 2nd set no growth thus far  · ID following  · Had been on IV vancomycin but discontinued due to fluctuating levels  Thus was transitioned to IV daptomycin to continue through 9/15  · PICC access changed on 9/9/22  · S/p TAVIA (9/9) no evidence of vegetation  · Repeat blood cx neg x 5 days   · TPN discontinued   S/p PEG placement      Acute encephalopathy  Assessment & Plan  · Secondary to hospital induced delirium vs metabolic encephalopathy  · No focal deficit on exam  · Delirium precautions  · Improved MS    Acute on chronic anemia  Assessment & Plan  · GI on board  · 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 [address] )  Assessment & Plan  Creatinine of 1 37 from 1 61  Improving  Avoid all possible nephrotoxins    Ambulatory dysfunction  Assessment & Plan  · Exacerbated by diarrhea/dehydration however patient is chronically malnourished  · S/p PT/OT evaluation; to return to SNF  · Family/PT wishing alternate SNF  CM on board to find replacement    Goals of care, counseling/discussion  Assessment & Plan  · Palliative on board  S/p family mtg patient has had a change of heart and wants to be txt focused and proceed with PEG placement  She states that she realized that she is not ready to die  · She remains full code      Moderate protein-calorie malnutrition ([facility] )  Assessment & Plan  Malnutrition Findings:   Adult Malnutrition type: Chronic illness  Adult Degree of Malnutrition: Other severe protein calorie malnutrition  Malnutrition Characteristics: Weight loss, Muscle loss     On Jevity 1 2   Tolerating it well                  360 Statement: Severe/Chronic malnutrition r/t condition, as evidenced by 4 8% wt loss x < 1 week (9/2/22: 125#, 9/5/22: 119#), and severe muscle mass depletion (temples/clavicle)  Treated with liberalized diet and nutrition supplements, possibly nutrition support pending goals of care    BMI Findings: Body mass index is 20 43 kg/m²  Hx of Savage-en Y gastric bypass, 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  Now off due to recurrent bacteremia  S/p PEG placement and on PO feeds  Cont nutritional supplements    H/O bariatric surgery - bypass  Assessment & Plan  · Patient with history of gastric bypass surgery  Has had significant malnutrition issues and anastomotic ulcerations    Seen by Bariatric Service during recent hospitalization at [facility] who recommended prolonged course of TPN for nutritional optimization and possible revision of surgery in the future  · Was planned to have appt with bariatric sx on  however had to be rescheduled  · Given recurrent bacteremia; s/p GI consult; recommended discontinuation of TPN  S/p PEG, TF initiated, Await to reach goal  · On PO diet  Tolerate as pt able  · To f/u with bariatric surgery          VTE Pharmacologic Prophylaxis: VTE Score: 3 Moderate Risk (Score 3-4) - Pharmacological DVT Prophylaxis Ordered: heparin  Patient Centered Rounds: I performed bedside rounds with nursing staff today  Discussions with Specialists or Other Care Team Provider: KEITH    Education and Discussions with Family / Patient: Updated  (sister) via phone  Time Spent for Care: 30 minutes  More than 50% of total time spent on counseling and coordination of care as described above  Current Length of Stay: 11 day(s)  Current Patient Status: Inpatient   Certification Statement: The patient will continue to require additional inpatient hospital stay due to placement  Discharge Plan: Anticipate discharge in >72 hrs to discharge location to be determined pending rehab evaluations  Code Status: Level 1 - Full Code    Subjective:   No complaint    Objective:     Vitals:   Temp (24hrs), Av 8 °F (36 6 °C), Min:97 5 °F (36 4 °C), Max:98 1 °F (36 7 °C)    Temp:  [97 5 °F (36 4 °C)-98 1 °F (36 7 °C)] 97 5 °F (36 4 °C)  HR:  [72-83] 78  Resp:  [16-18] 16  BP: (101-107)/(52-60) 107/53  SpO2:  [94 %-96 %] 96 %  Body mass index is 20 43 kg/m²  Input and Output Summary (last 24 hours):   No intake or output data in the 24 hours ending 22 2267    Physical Exam:   Constitutional:       Comments: Cachectic   Cardiovascular:      Rate and Rhythm: Normal rate and regular rhythm       Pulses: Normal pulses       Heart sounds: Normal heart sounds     Pulmonary:      Effort: Pulmonary effort is normal  No respiratory distress       Breath sounds: Normal breath sounds  No wheezing or rales  Abdominal:      General: Abdomen is flat  Bowel sounds are normal  There is no distension       Palpations: Abdomen is soft       Tenderness: There is no abdominal tenderness  There is no guarding       Comments: Peg in place   Musculoskeletal:         General: Normal range of motion       Right lower leg: No edema       Left lower leg: No edema  Skin:     General: Skin is warm and dry  Neurological:      General: No focal deficit present       Mental Status: She is alert and oriented to person, place, and time  Mental status is at baseline       Cranial Nerves: No cranial nerve deficit       Motor: No weakness       Additional Data:     Labs:  Results from last 7 days   Lab Units 09/11/22  0553 09/08/22  0551 09/06/22  0439   WBC Thousand/uL 10 74*   < > 13 91*   HEMOGLOBIN g/dL 7 6*   < > 8 7*   HEMATOCRIT % 24 7*   < > 28 0*   PLATELETS Thousands/uL 443*   < > 511*   BANDS PCT %  --   --  11*   LYMPHO PCT %  --   --  8*   MONO PCT %  --   --  4   EOS PCT %  --   --  6    < > = values in this interval not displayed       Results from last 7 days   Lab Units 09/11/22  0553 09/10/22  0605   SODIUM mmol/L 136 136   POTASSIUM mmol/L 3 8 3 8   CHLORIDE mmol/L 108 107   CO2 mmol/L 24 23   BUN mg/dL 14 15   CREATININE mg/dL 1 37* 1 61*   ANION GAP mmol/L 4 6   CALCIUM mg/dL 7 3* 8 0*   ALBUMIN g/dL  --  1 6*   TOTAL BILIRUBIN mg/dL  --  0 15*   ALK PHOS U/L  --  161*   ALT U/L  --  23   AST U/L  --  31   GLUCOSE RANDOM mg/dL 110 102         Results from last 7 days   Lab Units 09/11/22  1122 09/11/22  0643 09/11/22  0021 09/10/22  1715 09/10/22  1133 09/10/22  0551 09/09/22  2045 09/09/22  1646 09/09/22  0645 09/09/22  0047 09/08/22  1114 09/08/22  0543   POC GLUCOSE mg/dl 150* 134 114 103 103 128 151* 88 98 105 94 96               Lines/Drains:  Invasive Devices  Report    Peripherally Inserted Central Catheter Line  Duration PICC Line 09/09/22 Right Brachial 2 days          Drain  Duration           External Urinary Catheter 36 days    Rectal Tube With balloon 11 days    Gastrostomy/Enterostomy Percutaneous Endoscopic Gastrostomy (PEG) 20 Fr  LUQ 2 days                Central Line:  Goal for removal: Will discontinue when meds requiring line are completed  Imaging: No pertinent imaging reviewed  Recent Cultures (last 7 days):         Last 24 Hours Medication List:   Current Facility-Administered Medications   Medication Dose Route Frequency Provider Last Rate   • acetaminophen  650 mg Oral Q6H PRN Jovita Mcdermott DO     • ALPRAZolam  0 25 mg Oral TID PRN Neno Mcintosh DO     • alteplase  2 mg Intracatheter Once [employer]ES     • clonazePAM  3 mg Oral HS Jovita Baldemar, DO     • DAPTOmycin  6 mg/kg Intravenous Q48H Shirley Garcia  mg (74/04/36 0489)   • folic acid  1 mg Oral Daily Jovita Baldemar, DO     • heparin (porcine)  5,000 Units Subcutaneous Q8H [address] Kaylan Arroyo DO     • levothyroxine  112 mcg Oral Early Morning Jovitakash Mcdermott, DO     • midodrine  5 mg Oral TID AC Jovitakash Mcdermott, DO     • ondansetron  4 mg Intravenous Q6H PRN Jovitakash Mcdermott DO     • pantoprazole  40 mg Oral BID AC Kaylan Arroyo DO     • sucralfate  1 g Oral 4x Daily (AC & HS) Neno Mcintosh DO     • thiamine  100 mg Oral Daily Jovita Baldemar, DO     • traZODone  150 mg Oral HS Jovitakash Mcdermott, DO     • vancomycin  125 mg Oral Q6H [address] Kwan Buckley DO          Today, Patient Was Seen By: Darling Bejarano MD    **Please Note: This note may have been constructed using a voice recognition system  **

## 2022-09-11 NOTE — ASSESSMENT & PLAN NOTE
· Resolved  · Secondary to hospital induced delirium vs metabolic encephalopathy  · No focal deficit on exam  · Delirium precautions

## 2022-09-11 NOTE — ASSESSMENT & PLAN NOTE
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

## 2022-09-12 NOTE — PROGRESS NOTES
Progress Note - Infectious Disease   Bon Abdi 76 y o  female MRN: [id number]  Unit/Bed#: [facility] 317-01 Encounter: [id number]      Impression/Plan:  1  Sepsis, POA  Patient noted with brisk leukocytosis along with fever  Multiple sources at this time including 2, 3 and 6  Clinically improved  White count now remains slightly elevated which may either be reactive or volume related  Continue IV and oral antibiotic as below  Duration of antibiotic therapy as below  Continue to trend fever curve/vitals while admitted  Repeat labs ordered for tomorrow  Palliative care evaluation appreciated  Additional supportive care/discharge as per primary     2  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  One of 2 cultures have now returned positive, 2 different sub species   Patient had previous episode of line-related bacteremia  Ultimately difficult to exclude recurrence in the setting of 7  PICC line removed on 09/06  Repeat cultures NG  2D echo unremarkable  Transesophageal echo negative  PICC line replaced on 09/09  Additional susceptibilities reviewed from micro lab for completeness  Plan for total 2 weeks of therapy  Given difficulty with vancomycin dosing will maintain on daptomycin  Continue daptomycin 325 mg every 48 hours  Will dose adjust antibiotics as needed  Weekly labs with CBCD, CMP and CK, ordered for tomorrow  Plan for total of 2 weeks of therapy through 9/15  Continue oral vancomycin 125 q 6 through 9/18  Last doses of antibiotics ordered  Discontinue PICC line after IV antibiotics completed  No formal follow-up required in ID office  ID office staff notified of the above  Additional supportive care/discharge as per primary     3  Colitis on CT images and diarrhea   Patient was having diarrhea which may be related to 6  Ultimately cannot rule out C diff either   PCR positive     Continue oral vancomycin 125 q 6 through 9/18  Last doses of antibiotics ordered  Monitor abdominal exam  Continue to trend fever curve/WBC     4  Abnormal UA  Patient without symptoms   Asymptomatic bacteriuria  No antibiotics continued for this issue      5  Transaminitis  Niko Degroottricia suspect that this is related to volume losses   Continue to trend for now         6  Acute anemia and prior GI bleeding   Patient's hemoglobin had acutely decreased     She has had prior bleeding marginal ulcers which has contributed to recurrent admissions chronic TPN use   Also present on EGD on   Status post PEG tube on   Antibiotics as above  Tube feeds as per primary/GI  Ongoing follow-up by Nutrition  Palliative care evaluation appreciated        7  Chronic TPN use  Has been on since admission in  to optimize her nutrition   Unfortunately now her course has been complicated by bacteremia  Now transitioned to PEG tube feeding  Plans to remove PICC line after antibiotics completed  PEG tube feeding as per primary/GI  Remove PICC line after antibiotics completed as above  Palliative care evaluation appreciated     Above plan discussed briefly with the patient at bedside  ID consult service will continue to follow  Antibiotics:  Vancomycin IV 13  Oral vancomycin 14    Subjective:  Patient seen at bedside and he denied having any nausea vomiting, chest pain or shortness of breath  She did report some abdominal bloating which she thinks is improving  Denies having any pain at her PICC line  Denies having any diarrhea  Objective:  Vitals:  Temp:  [97 5 °F (36 4 °C)-99 4 °F (37 4 °C)] 99 4 °F (37 4 °C)  HR:  [] 103  Resp:  [16-20] 18  BP: (103-118)/(53-55) 105/53  SpO2:  [94 %-96 %] 94 %  Temp (24hrs), Av 2 °F (36 8 °C), Min:97 5 °F (36 4 °C), Max:99 4 °F (37 4 °C)  Current: Temperature: 99 4 °F (37 4 °C)    Physical Exam:   General Appearance:  Alert, interactive, nontoxic, no acute distress  Chronically ill-appearing  Throat: Oropharynx moist without lesions      Lungs:   Clear to auscultation bilaterally; no wheezes, rhonchi or rales; respirations unlabored on room air   Heart:  RRR; no murmur, rub or gallop appreciated   Abdomen:   Soft, non-tender, slightly distended compared to prior, positive bowel sounds  Peg tube in place under binder  Extremities: No clubbing, cyanosis or edema   Skin: No new rashes or lesions  No new draining wounds noted  PICC line insertion site unremarkable; patient unfortunately tends to pick at the dressing  Labs, Imaging, & Other studies:   All pertinent labs and imaging studies were personally reviewed  Results from last 7 days   Lab Units 09/12/22  0647 09/11/22  0553 09/10/22  0605   WBC Thousand/uL 12 43* 10 74* 11 80*   HEMOGLOBIN g/dL 7 7* 7 6* 7 8*   PLATELETS Thousands/uL 454* 443* 449*     Results from last 7 days   Lab Units 09/12/22  0647 09/11/22  0553 09/10/22  0605 09/08/22  0551 09/06/22  0439   POTASSIUM mmol/L 4 4   < > 3 8   < > 4 0   CHLORIDE mmol/L 101   < > 107   < > 107   CO2 mmol/L 26   < > 23   < > 23   BUN mg/dL 18   < > 15   < > 16   CREATININE mg/dL 1 42*   < > 1 61*   < > 1 32*   EGFR ml/min/1 73sq m 35   < > 30   < > 38   CALCIUM mg/dL 7 9*   < > 8 0*   < > 8 8   AST U/L 50*  --  31  --  50*   ALT U/L 30  --  23  --  66   ALK PHOS U/L 199*  --  161*  --  148*    < > = values in this interval not displayed

## 2022-09-12 NOTE — PLAN OF CARE
Problem: Potential for Falls  Goal: Patient will remain free of falls  Description: INTERVENTIONS:  - Educate patient/family on patient safety including physical limitations  - Instruct patient to call for assistance with activity   - Consult OT/PT to assist with strengthening/mobility   - Keep Call bell within reach  - Keep bed low and locked with side rails adjusted as appropriate  - Keep care items and personal belongings within reach  - Initiate and maintain comfort rounds  - Make Fall Risk Sign visible to staff  - Offer Toileting every *2** Hours, in advance of need  - Initiate/Maintain *bed**alarm  - Apply yellow socks and bracelet for high fall risk patients  - Consider moving patient to room near nurses station  Outcome: Progressing     Problem: MOBILITY - ADULT  Goal: Maintain or return to baseline ADL function  Description: INTERVENTIONS:  -  Assess patient's ability to carry out ADLs; assess patient's baseline for ADL function and identify physical deficits which impact ability to perform ADLs (bathing, care of mouth/teeth, toileting, grooming, dressing, etc )  - Assess/evaluate cause of self-care deficits   - Assess range of motion  - Assess patient's mobility; develop plan if impaired  - Assess patient's need for assistive devices and provide as appropriate  - Encourage maximum independence but intervene and supervise when necessary  - Involve family in performance of ADLs  - Assess for home care needs following discharge   - Consider OT consult to assist with ADL evaluation and planning for discharge  - Provide patient education as appropriate  Outcome: Progressing     Problem: INFECTION - ADULT  Goal: Absence or prevention of progression during hospitalization  Description: INTERVENTIONS:  - Assess and monitor for signs and symptoms of infection  - Monitor lab/diagnostic results  - Monitor all insertion sites, i e  indwelling lines, tubes, and drains  - Monitor endotracheal if appropriate and nasal secretions for changes in amount and color  - [place] appropriate cooling/warming therapies per order  - Administer medications as ordered  - Instruct and encourage patient and family to use good hand hygiene technique  - Identify and instruct in appropriate isolation precautions for identified infection/condition  Outcome: Progressing     Problem: DISCHARGE PLANNING  Goal: Discharge to home or other facility with appropriate resources  Description: INTERVENTIONS:  - Identify barriers to discharge w/patient and caregiver  - Arrange for needed discharge resources and transportation as appropriate  - Identify discharge learning needs (meds, wound care, etc )  - Arrange for interpretive services to assist at discharge as needed  - Refer to Case Management Department for coordinating discharge planning if the patient needs post-hospital services based on physician/advanced practitioner order or complex needs related to functional status, cognitive ability, or social support system  Outcome: Progressing

## 2022-09-12 NOTE — CASE MANAGEMENT
Case Management Discharge Planning Note    Patient name Rico Wilkins [facility] 317/[facility] 270-25 MRN [id number]  : 1945 Date 2022       Current Admission Date: 2022  Current Admission Diagnosis:Sepsis [facility])   Patient Active Problem List    Diagnosis Date Noted   • Acute encephalopathy 2022   • Moderate protein-calorie malnutrition ([facility] [address] ) 2022   • Bacteremia 2022   • Acute on chronic anemia 2022   • Goals of care, counseling/discussion 2022   • Colitis presumed to be due to infection 2022   • Acute cystitis without hematuria 2022   • Acute kidney insufficiency 2022   • Fall 2022   • Diarrhea 2022   • Sepsis ([facility] [address] ) 2022   • [facility] 20[facility]   • Gram-positive bacteremia 2022   • Severe protein-calorie malnutrition ([facility] [address] ) 2022   • Bilateral leg edema 2022   • Ambulatory dysfunction 2022   • Acute blood loss anemia 2022   • NOAH (acute kidney injury) ([facility] [address] ) 2022   • Gastric ulcer 2022   • Fever 2022   • Anemia 2022   • Dyspnea on exertion 2022   • Generalized weakness 2022   • Elevated LFTs 2022   • Hyponatremia 2022   • Abnormal CT scan 2022   • H/O bariatric surgery - bypass 2022   • Cerumen debris on tympanic membrane of right ear 2022   • Nasal congestion 2022   • Bilateral hearing loss 2022   • Fibromyalgia syndrome 2021   • Osteopenia of both forearms 2021   • Medicare annual wellness visit, subsequent 2021   • Shortness of breath 2021   • Acute bronchitis 2021   • COVID-19 2021   • Dysphasia 2020   • Epigastric pain 2020   • Hypothyroidism 2020   • Gastroesophageal reflux disease without esophagitis 2020   • Depression 2020   • Fibromyalgia, primary 2020   • Iron deficiency 2020   • Numbness of foot 2020      LOS (days): 12  Geometric Mean LOS (GMLOS) (days): 4 80  Days to GMLOS:-7 6     OBJECTIVE:  Risk of Unplanned Readmission Score: 34 71         Current admission status: Inpatient   Preferred Pharmacy:   [facility]  [address] [facility], [address] [place] [address] [address] [facility] [address]  Phone: [phone] Fax: [phone]    [facility], [facility] [address]  Phone: [phone] Fax: [phone]    Primary Care Provider: Keon Hodges MD    Primary Insurance: [facility]  Secondary Insurance:     DISCHARGE DETAILS:    Discharge planning discussed with[de-identified] Patient and family     Comments - Freedom of Choice: Pt and family adament that attending Md told the patient she would remain in the hospital until her IV atbx are finished  CM gave patient a copy of her IMM letter and informed ehr tomorrow she will have an new doctor that could disagree with that decision  CM explained that due to the fact that the patient is not a resident of [address] [place] [address] placing her there is very difficult  CM has contacted 50 Skilled facilities  Pt is now agreeable to returning to [facility] [address]  CM expained that [facility] is also sitll considering the patient  Pt now also has Peg tube with tube feed    CM contacted family/caregiver?: Yes  Were Treatment Team discharge recommendations reviewed with patient/caregiver?: Yes  Did patient/caregiver verbalize understanding of patient care needs?: N/A- going to facility  Were patient/caregiver advised of the risks associated with not following Treatment Team discharge recommendations?: Yes                   Other Referral/Resources/Interventions Provided:  Interventions: SNF

## 2022-09-12 NOTE — CASE MANAGEMENT
Case Management Discharge Planning Note    Patient name Raymond Roland  Location [facility] 912-29 MRN [id number]  : 1945 Date 2022       Current Admission Date: 2022  Current Admission Diagnosis:Sepsis [facility])   Patient Active Problem List    Diagnosis Date Noted   • Acute encephalopathy 2022   • Moderate protein-calorie malnutrition ([address] ) 2022   • Bacteremia 2022   • Acute on chronic anemia 2022   • Goals of care, counseling/discussion 2022   • Colitis presumed to be due to infection 2022   • Acute cystitis without hematuria 2022   • Acute kidney insufficiency 2022   • Fall 2022   • Diarrhea 2022   • Sepsis ([address] ) 2022   • Sacral ulcer ([facility] [address] ) 2022   • Gram-positive bacteremia 2022   • Severe protein-calorie malnutrition ([address] ) 2022   • Bilateral leg edema 2022   • Ambulatory dysfunction 2022   • Acute blood loss anemia 2022   • NOAH (acute kidney injury) ([facility] [address] ) 2022   • Gastric ulcer 2022   • Fever 2022   • Anemia 2022   • Dyspnea on exertion 2022   • Generalized weakness 2022   • Elevated LFTs 2022   • Hyponatremia 2022   • Abnormal CT scan 2022   • H/O bariatric surgery - bypass 2022   • Cerumen debris on tympanic membrane of right ear 2022   • Nasal congestion 2022   • Bilateral hearing loss 2022   • Fibromyalgia syndrome 2021   • Osteopenia of both forearms 2021   • Medicare annual wellness visit, subsequent 2021   • Shortness of breath 2021   • Acute bronchitis 2021   • COVID-19 2021   • Dysphasia 2020   • Epigastric pain 2020   • Hypothyroidism 2020   • Gastroesophageal reflux disease without esophagitis 2020   • Depression 2020   • Fibromyalgia, primary 2020   • Iron deficiency 2020   • Numbness of foot 2020      LOS (days): 12  Geometric Mean LOS (GMLOS) (days): 4 80  Days to GMLOS:-7 6     OBJECTIVE:  Risk of Unplanned Readmission Score: 34 64         Current admission status: Inpatient   Preferred Pharmacy:   [place] [address] [address] [address]  Phone: [phone] Fax: [phone]    [place] [address]  Phone: [phone] Fax: [phone]    Primary Care Provider: Lise Ray MD    Primary Insurance: [facility]  Secondary Insurance:     DISCHARGE DETAILS:                                  IMM Given (Date):: 09/12/22  IMM Given to[de-identified] Patient

## 2022-09-12 NOTE — PLAN OF CARE
Problem: Potential for Falls  Goal: Patient will remain free of falls  Description: INTERVENTIONS:  - Educate patient/family on patient safety including physical limitations  - Instruct patient to call for assistance with activity   - Consult OT/PT to assist with strengthening/mobility   - Keep Call bell within reach  - Keep bed low and locked with side rails adjusted as appropriate  - Keep care items and personal belongings within reach  - Initiate and maintain comfort rounds  - Make Fall Risk Sign visible to staff  - Apply yellow socks and bracelet for high fall risk patients  - Consider moving patient to room near nurses station  Outcome: Progressing     Problem: MOBILITY - ADULT  Goal: Maintain or return to baseline ADL function  Description: INTERVENTIONS:  -  Assess patient's ability to carry out ADLs; assess patient's baseline for ADL function and identify physical deficits which impact ability to perform ADLs (bathing, care of mouth/teeth, toileting, grooming, dressing, etc )  - Assess/evaluate cause of self-care deficits   - Assess range of motion  - Assess patient's mobility; develop plan if impaired  - Assess patient's need for assistive devices and provide as appropriate  - Encourage maximum independence but intervene and supervise when necessary  - Involve family in performance of ADLs  - Assess for home care needs following discharge   - Consider OT consult to assist with ADL evaluation and planning for discharge  - Provide patient education as appropriate  Outcome: Progressing  Goal: Maintains/Returns to pre admission functional level  Description: INTERVENTIONS:  - Perform BMAT or MOVE assessment daily    - Set and communicate daily mobility goal to care team and patient/family/caregiver     - Collaborate with rehabilitation services on mobility goals if consulted  - Out of bed for toileting  - Record patient progress and toleration of activity level   Outcome: Progressing     Problem: INFECTION - ADULT  Goal: Absence or prevention of progression during hospitalization  Description: INTERVENTIONS:  - Assess and monitor for signs and symptoms of infection  - Monitor lab/diagnostic results  - Monitor all insertion sites, i e  indwelling lines, tubes, and drains  - Monitor endotracheal if appropriate and nasal secretions for changes in amount and color  - [place] appropriate cooling/warming therapies per order  - Administer medications as ordered  - Instruct and encourage patient and family to use good hand hygiene technique  - Identify and instruct in appropriate isolation precautions for identified infection/condition  Outcome: Progressing  Goal: Absence of fever/infection during neutropenic period  Description: INTERVENTIONS:  - Monitor WBC    Outcome: Progressing     Problem: SAFETY ADULT  Goal: Patient will remain free of falls  Description: INTERVENTIONS:  - Educate patient/family on patient safety including physical limitations  - Instruct patient to call for assistance with activity   - Consult OT/PT to assist with strengthening/mobility   - Keep Call bell within reach  - Keep bed low and locked with side rails adjusted as appropriate  - Keep care items and personal belongings within reach  - Initiate and maintain comfort rounds  - Make Fall Risk Sign visible to staff  - Apply yellow socks and bracelet for high fall risk patients  - Consider moving patient to room near nurses station  Outcome: Progressing  Goal: Maintain or return to baseline ADL function  Description: INTERVENTIONS:  -  Assess patient's ability to carry out ADLs; assess patient's baseline for ADL function and identify physical deficits which impact ability to perform ADLs (bathing, care of mouth/teeth, toileting, grooming, dressing, etc )  - Assess/evaluate cause of self-care deficits   - Assess range of motion  - Assess patient's mobility; develop plan if impaired  - Assess patient's need for assistive devices and provide as appropriate  - Encourage maximum independence but intervene and supervise when necessary  - Involve family in performance of ADLs  - Assess for home care needs following discharge   - Consider OT consult to assist with ADL evaluation and planning for discharge  - Provide patient education as appropriate  Outcome: Progressing  Goal: Maintains/Returns to pre admission functional level  Description: INTERVENTIONS:  - Perform BMAT or MOVE assessment daily    - Set and communicate daily mobility goal to care team and patient/family/caregiver  - Collaborate with rehabilitation services on mobility goals if consulted  - Out of bed for toileting  - Record patient progress and toleration of activity level   Outcome: Progressing     Problem: DISCHARGE PLANNING  Goal: Discharge to home or other facility with appropriate resources  Description: INTERVENTIONS:  - Identify barriers to discharge w/patient and caregiver  - Arrange for needed discharge resources and transportation as appropriate  - Identify discharge learning needs (meds, wound care, etc )  - Arrange for interpretive services to assist at discharge as needed  - Refer to Case Management Department for coordinating discharge planning if the patient needs post-hospital services based on physician/advanced practitioner order or complex needs related to functional status, cognitive ability, or social support system  Outcome: Progressing     Problem: Knowledge Deficit  Goal: Patient/family/caregiver demonstrates understanding of disease process, treatment plan, medications, and discharge instructions  Description: Complete learning assessment and assess knowledge base    Interventions:  - Provide teaching at level of understanding  - Provide teaching via preferred learning methods  Outcome: Progressing     Problem: SKIN/TISSUE INTEGRITY - ADULT  Goal: Skin Integrity remains intact(Skin Breakdown Prevention)  Description: Assess:  -Assess extremities for adequate circulation and sensation     Bed Management:  -Have minimal linens on bed & keep smooth, unwrinkled  -Change linens as needed when moist or perspiring    Toileting:  -Offer bedside commode    Skin Care:  -Avoid use of baby powder, tape, friction and shearing, hot water or constrictive clothing    Outcome: Progressing  Goal: Incision(s), wounds(s) or drain site(s) healing without S/S of infection  Description: INTERVENTIONS  - Assess and document dressing, incision, wound bed, drain sites and surrounding tissue  - Provide patient and family education  Outcome: Progressing  Goal: Pressure injury heals and does not worsen  Description: Interventions:  - Implement low air loss mattress or specialty surface (Criteria met)  - Apply silicone foam dressing  - Consider nutrition services referral as needed  Outcome: Progressing     Problem: Prexisting or High Potential for Compromised Skin Integrity  Goal: Skin integrity is maintained or improved  Description: INTERVENTIONS:  - Identify patients at risk for skin breakdown  - Assess and monitor skin integrity  - Assess and monitor nutrition and hydration status  - Monitor labs   - Assess for incontinence   - Turn and reposition patient  - Assist with mobility/ambulation  - Relieve pressure over bony prominences  - Avoid friction and shearing  - Provide appropriate hygiene as needed including keeping skin clean and dry  - Evaluate need for skin moisturizer/barrier cream  - Collaborate with interdisciplinary team   - Patient/family teaching  - Consider wound care consult   Outcome: Progressing     Problem: Nutrition/Hydration-ADULT  Goal: Nutrient/Hydration intake appropriate for improving, restoring or maintaining nutritional needs  Description: Monitor and assess patient's nutrition/hydration status for malnutrition  Collaborate with interdisciplinary team and initiate plan and interventions as ordered  Monitor patient's weight and dietary intake as ordered or per policy  Utilize nutrition screening tool and intervene as necessary  Determine patient's food preferences and provide high-protein, high-caloric foods as appropriate       INTERVENTIONS:  - Monitor oral intake, urinary output, labs, and treatment plans  - Assess nutrition and hydration status and recommend course of action  - Evaluate amount of meals eaten  - Assist patient with eating if necessary   - Allow adequate time for meals  - Recommend/ encourage appropriate diets, oral nutritional supplements, and vitamin/mineral supplements  - Order, calculate, and assess calorie counts as needed  - Recommend, monitor, and adjust tube feedings and TPN/PPN based on assessed needs  - Assess need for intravenous fluids  - Provide specific nutrition/hydration education as appropriate  - Include patient/family/caregiver in decisions related to nutrition  Outcome: Progressing

## 2022-09-13 NOTE — PLAN OF CARE
Problem: OCCUPATIONAL THERAPY ADULT  Goal: Performs self-care activities at highest level of function for planned discharge setting  See evaluation for individualized goals  Description: Treatment Interventions: ADL retraining, Functional transfer training, Endurance training, UE strengthening/ROM, Cognitive reorientation, Patient/family training, Equipment evaluation/education, Compensatory technique education, Continued evaluation, Energy conservation, Activityengagement          See flowsheet documentation for full assessment, interventions and recommendations  Outcome: Progressing  Note: Limitation: Decreased ADL status, Decreased endurance, Decreased self-care trans, Decreased high-level ADLs  Prognosis: Fair  Assessment: Pt was seen this date for OT tx session focusing on self care tasks, bed mobility, sitting balance and tolerance, safety awareness, compensatory techniques, energy conservation, and overall activity tolerance  Pt presents supine and is agreeable to OT tx session  Pt completes previously mentioned tasks at documented assist levels please see above in flow sheet  Pt continues to require increased physical assist for functional tasks  Pt is overall limited by decreased balance, increased pain, increased fatigue, decreased strength, endurance, and activity tolerance and continues to function below baseline level  Pt resting in supine at end of session with all needs in reach and alarm on  Pt would benefit from continued OT Tx to improve overall functional abilities and increase independence  Will continue to follow with current POC       OT Discharge Recommendation: Post acute rehabilitation services

## 2022-09-13 NOTE — ASSESSMENT & PLAN NOTE
· Patient with history of gastric bypass surgery  Has had significant malnutrition issues and anastomotic ulcerations  Seen by Bariatric Service during recent hospitalization at [facility] who recommended prolonged course of TPN for nutritional optimization and possible revision of surgery in the future  · Was planned to have appt with bariatric sx on 8/20 however had to be rescheduled  · Given recurrent bacteremia; s/p GI consult; recommended discontinuation of TPN  S/p PEG, TF initiated, goal reached l  · P o   Diet for pleasure feeding  · To f/u with bariatric surgery

## 2022-09-13 NOTE — PROGRESS NOTES
[facility]  Progress Note - Tacho Cuello 1945, 76 y o  female MRN: [id number]  Unit/Bed#: [facility] 317-01 Encounter: [id number]  Primary Care Provider: Angelica Sheth MD   Date and time admitted to hospital: 8/30/2022 11:49 PM    Acute encephalopathy  Assessment & Plan  · Resolved  · Secondary to hospital induced delirium vs metabolic encephalopathy  · No focal deficit on exam  · Delirium precautions      Goals of care, counseling/discussion  Assessment & Plan  · Palliative on board  Family meeting held with prior provider:  patient has had a change of heart and wants to be txt focused and proceed with PEG placement  She states that she realized that she is not ready to die  · She remains full code      Acute on chronic anemia  Assessment & Plan  · Evaluated by GI prior  · 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  · S/p recent EGD in 7/22 revealing: Residual marginal ulcer of the gastrojejunostomy anastomosis with improved size  · S/p EGD 9/2 revealing: Large, cratered ulcer in the gastrojejunal anastomosis   · Received PRBC transfusion x 1, hemoglobin stable over past 48 hours  · Typed and screened for possible transfusion once hgb <7 0  · Cont PPI, BID, carafate    Bacteremia  Assessment & Plan  · Recent hx of staph epi bacteremia in 7/2022, presumed due to picc line  · Now with possible recurrence 1/2 set staph epi, 2nd set no growth thus far  · Had been on IV vancomycin but discontinued due to fluctuating levels  Thus was transitioned to IV daptomycin to continue through 9/15  · PICC access changed on 9/9/22  · S/p TAVIA (9/9) no evidence of vegetation  · Repeat blood cx neg x 5 days   · TPN discontinued   S/p PEG placement  · DC iv access before dc  · Appreciate continued ID following      Moderate protein-calorie malnutrition (HCC)  Assessment & Plan  Malnutrition Findings:   Adult Malnutrition type: Chronic illness  Adult Degree of Malnutrition: Other severe protein calorie malnutrition  Malnutrition Characteristics: Weight loss, Muscle loss     On Jevity 1 2   Tolerating it well at goal of 69 cc/hr from 6 pm to 6am   Checked CMP and it shows hyponatremia of 130, low albumin but mildly elevated AST and A phos  Will obtain serum osmolality and will defer to nutrition for rec    360 Statement: Severe/Chronic malnutrition r/t condition, as evidenced by 4 8% wt loss x < 1 week (9/2/22: 125#, 9/5/22: 119#), and severe muscle mass depletion (temples/clavicle)  Treated with liberalized diet and nutrition supplements, possibly nutrition support pending goals of care    BMI Findings: Body mass index is 21 27 kg/m²  Hx of Savage-en Y gastric bypass, 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  Now off due to recurrent bacteremia  S/p PEG placement and on PO feeds  Cont nutritional supplements    Ambulatory dysfunction  Assessment & Plan  · Exacerbated by diarrhea/dehydration however patient is chronically malnourished  · S/p PT/OT evaluation; to return to SNF  · Family/PT wishing alternate SNF  CM on board to find replacement    NOAH (acute kidney injury) ([facility] [address] )  Assessment & Plan  Creatinine of 1 42 to 1 7 today noted continued up trend suspect related to prerenal intravascular volume depletion  Consider nephrology continued to worsen  Avoid all possible nephrotoxins  Monitor    H/O bariatric surgery - bypass  Assessment & Plan  · Patient with history of gastric bypass surgery  Has had significant malnutrition issues and anastomotic ulcerations  Seen by Bariatric Service during recent hospitalization at [facility] who recommended prolonged course of TPN for nutritional optimization and possible revision of surgery in the future  · Was planned to have appt with bariatric sx on 8/20 however had to be rescheduled  · Given recurrent bacteremia; s/p GI consult; recommended discontinuation of TPN  S/p PEG, TF initiated, goal reached l  · P o   Diet for pleasure feeding  · To f/u with bariatric surgery      * Sepsis ([facility] [address] )  Assessment & Plan  · Resolved  · WBC Of 12 5 after PEG feed was started but no fever, chills, tachycardia or tachypnea  · Will obtain ESR  CRP and procalcitonin to help tailor need for antibacterial indication   · Initial source was bacteremia, C diff colitis  ID appreciated  · + evidence of colitis on ct scan  Status post C diff panel; positive toxin PCR but negative EIA  On p o  Vancomycin through 9/18  Diarrhea resolved  · Positive recurrent bacteremia  Presumed secondary to PICC line with use of TPN  Prior history of recent MSSE  · Repeat blood cx neg after 5 days  · Discontinued presenting PICC  New PICC ordered (9/9) which will also be dc'ed after abx is completed  · Continue IV daptomycin through 9/15  · Ur cx polymicrobial but non significant colony ct  Treatment not indicated  · Cont to trend wbc            VTE Pharmacologic Prophylaxis: VTE Score: 3 Moderate Risk (Score 3-4) - Pharmacological DVT Prophylaxis Ordered: heparin  Patient Centered Rounds: I performed bedside rounds with nursing staff today  Discussions with Specialists or Other Care Team Provider: CM    Education and Discussions with Family / Patient: Updated  (sister and brother) at bedside  Time Spent for Care: 45 minutes  More than 50% of total time spent on counseling and coordination of care as described above  Current Length of Stay: 13 day(s)  Current Patient Status: Inpatient   Certification Statement: The patient will continue to require additional inpatient hospital stay due to Completion of IV antibiotic course, placement  Discharge Plan: Anticipate discharge in 48-72 hrs to rehab facility  Code Status: Level 1 - Full Code    Subjective:   Patient seen and examined at bedside  No acute events overnight    Today patient complains of discomfort related to binding straps Holding newly placed PEG tube in place and associated sweating under the straps  Also complaining of pain at PEG tube site  Also notes anxiety related to her current condition, hospitalization, upcoming facility placement  Objective:     Vitals:   Temp (24hrs), Av 2 °F (36 8 °C), Min:97 4 °F (36 3 °C), Max:99 4 °F (37 4 °C)    Temp:  [97 4 °F (36 3 °C)-99 4 °F (37 4 °C)] 97 9 °F (36 6 °C)  HR:  [84-97] 84  Resp:  [13-26] 13  BP: ()/(49-52) 89/49  SpO2:  [93 %-95 %] 95 %  Body mass index is 21 27 kg/m²  Input and Output Summary (last 24 hours): Intake/Output Summary (Last 24 hours) at 2022 1849  Last data filed at 2022 0558  Gross per 24 hour   Intake --   Output 800 ml   Net -800 ml       Physical Exam:   Physical Exam  Constitutional:       Comments: Elderly female, chronically ill, cachectic   Cardiovascular:      Rate and Rhythm: Normal rate and regular rhythm  Pulses: Normal pulses  Heart sounds: Normal heart sounds  Pulmonary:      Effort: Pulmonary effort is normal  No respiratory distress  Breath sounds: Normal breath sounds  No wheezing or rales  Abdominal:      General: Abdomen is flat  Bowel sounds are normal       Palpations: Abdomen is soft  Tenderness: There is no abdominal tenderness  There is no guarding  Comments: Mildly distended, left-sided PEG tube in place without surrounding erythema or discharge   Musculoskeletal:         General: Normal range of motion  Right lower leg: No edema  Left lower leg: No edema  Skin:     General: Skin is warm and dry  Neurological:      General: No focal deficit present  Mental Status: She is alert and oriented to person, place, and time  Cranial Nerves: No cranial nerve deficit  Motor: No weakness            Additional Data:     Labs:  Results from last 7 days   Lab Units 22  0539   WBC Thousand/uL 12 22*   HEMOGLOBIN g/dL 7 7*   HEMATOCRIT % 25 4*   PLATELETS Thousands/uL 483*   NEUTROS PCT % 70   LYMPHS PCT % 14   MONOS PCT % 7   EOS PCT % 8*     Results from last 7 days   Lab Units 09/13/22  0539   SODIUM mmol/L 131*   POTASSIUM mmol/L 4 9   CHLORIDE mmol/L 101   CO2 mmol/L 26   BUN mg/dL 22   CREATININE mg/dL 1 47*   ANION GAP mmol/L 4   CALCIUM mg/dL 8 0*   ALBUMIN g/dL 1 7*   TOTAL BILIRUBIN mg/dL 0 25   ALK PHOS U/L 226*   ALT U/L 42   AST U/L 76*   GLUCOSE RANDOM mg/dL 146*         Results from last 7 days   Lab Units 09/13/22  1807 09/13/22  0606 09/12/22  1838 09/12/22  0756 09/11/22  1122 09/11/22  0643 09/11/22  0021 09/10/22  1715 09/10/22  1133 09/10/22  0551 09/09/22  2045 09/09/22  1646   POC GLUCOSE mg/dl 113 128 106 105 150* 134 114 103 103 128 151* 88         Results from last 7 days   Lab Units 09/13/22  0539 09/12/22  1525   PROCALCITONIN ng/ml 0 27* 0 27*       Lines/Drains:  Invasive Devices  Report    Peripherally Inserted Central Catheter Line  Duration           PICC Line 09/09/22 Right Brachial 4 days          Drain  Duration           External Urinary Catheter 38 days    Rectal Tube With balloon 13 days    Gastrostomy/Enterostomy Percutaneous Endoscopic Gastrostomy (PEG) 20 Fr  LUQ 5 days                Central Line:  Goal for removal: N/A - Discharging with PICC for IV ABX/medications             Imaging: No pertinent imaging reviewed      Recent Cultures (last 7 days):         Last 24 Hours Medication List:   Current Facility-Administered Medications   Medication Dose Route Frequency Provider Last Rate   • acetaminophen  650 mg Oral Q6H PRN Jovita Mcdermott DO     • ALPRAZolam  0 25 mg Oral TID PRN Ilda Boudreaux DO     • alteplase  2 mg Intracatheter Once [employer], PA-C     • clonazePAM  3 mg Oral HS Jovita Mcdermott DO     • DAPTOmycin  6 mg/kg Intravenous Q48H Erik Donald  mg (20/25/38 9420)   • folic acid  1 mg Oral Daily Jovitaaixa Mcdermott DO     • heparin (porcine)  5,000 Units Subcutaneous [facility] Ilda Boudreaux DO     • levothyroxine  112 mcg Oral Early Morning Jovita Mcdermott DO     • midodrine 5 mg Oral TID AC Jovitakash Mcdermott, DO     • ondansetron  4 mg Intravenous Q6H PRN Kp Sims DO     • oxyCODONE  2 5 mg Oral Q6H PRN Reymundo Multani MD     • pantoprazole  40 mg Oral BID AC Kaylan Miriam Arroyo, DO     • sucralfate  1 g Oral 4x Daily (AC & HS) Ferna Push, DO     • thiamine  100 mg Oral Daily Jovita Baldemar, DO     • traZODone  150 mg Oral HS Jovitakash Mcdermott, DO     • vancomycin  125 mg Oral Q6H [address] Davian Larios MD          Today, Patient Was Seen By: Kriss Galindo MD    **Please Note: This note may have been constructed using a voice recognition system  **

## 2022-09-13 NOTE — ASSESSMENT & PLAN NOTE
Malnutrition Findings:   Adult Malnutrition type: Chronic illness  Adult Degree of Malnutrition: Other severe protein calorie malnutrition  Malnutrition Characteristics: Weight loss, Muscle loss     On Jevity 1 2   Tolerating it well at goal of 69 cc/hr from 6 pm to 6am   Checked CMP and it shows hyponatremia of 130, low albumin but mildly elevated AST and A phos  Will obtain serum osmolality and will defer to nutrition for rec    360 Statement: Severe/Chronic malnutrition r/t condition, as evidenced by 4 8% wt loss x < 1 week (9/2/22: 125#, 9/5/22: 119#), and severe muscle mass depletion (temples/clavicle)  Treated with liberalized diet and nutrition supplements, possibly nutrition support pending goals of care    BMI Findings: Body mass index is 21 27 kg/m²  Hx of Savage-en Y gastric bypass, 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

## 2022-09-13 NOTE — ASSESSMENT & PLAN NOTE
· Recent hx of staph epi bacteremia in 7/2022, presumed due to picc line  · Now with possible recurrence 1/2 set staph epi, 2nd set no growth thus far  · Had been on IV vancomycin but discontinued due to fluctuating levels  Thus was transitioned to IV daptomycin to continue through 9/15  · PICC access changed on 9/9/22  · S/p TAVIA (9/9) no evidence of vegetation  · Repeat blood cx neg x 5 days   · TPN discontinued   S/p PEG placement  · DC iv access before dc  · Appreciate continued ID following

## 2022-09-13 NOTE — ASSESSMENT & PLAN NOTE
Creatinine of 1 42 to 1 7 today noted continued up trend suspect related to prerenal intravascular volume depletion  Consider nephrology continued to worsen  Avoid all possible nephrotoxins   Monitor

## 2022-09-13 NOTE — QUICK NOTE
9/12/2022 9:07 PM -  Doc Enrique Sauer's chart and case were reviewed by Pawel Mae PA-C  Mode of review included electronic chart check  Per review, symptoms remain controlled on current regimen and no changes are made at this time  Please continue the regimen in place, and review our last note for details  For dispo plan, please review Case Management notes  Appears patient is pending discharge to rehab  As symptoms are well controlled and goals are well defined Palliative will  sign off at this time  For urgent issues or any questions/concerns, please notify on-call provider via [address] [place] [address]  You may also call our answering service 24/7 at   Pawel Mae PA-C  Palliative and Supportive Care  Clinic/Answering Service: [phone]  You can find me on [employer]!

## 2022-09-13 NOTE — PROGRESS NOTES
Progress Note - Infectious Disease   Jie Sy 76 y o  female MRN: [id number]  Unit/Bed#: [facility] 317-01 Encounter: [id number]      Impression/Plan:  1  Sepsis, POA  Patient noted with brisk leukocytosis along with fever  Multiple sources at this time including 2, 3 and 6  Clinically improved  White count now remains slightly elevated which may either be reactive, volume related or possibly drug related with peripheral eosinophilia noted  No overt allergic symptoms  Continue IV and oral antibiotic as below  Duration of antibiotic therapy as below  Continue to trend fever curve/vitals while admitted  Repeat labs ordered for tomorrow  Palliative care evaluation appreciated  Additional supportive care/discharge as per primary     2  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  One of 2 cultures have now returned positive, 2 different sub species   Patient had previous episode of line-related bacteremia  Ultimately difficult to exclude recurrence in the setting of 7  PICC line removed on 09/06  Repeat cultures NG  2D echo unremarkable  Transesophageal echo negative   PICC line replaced on 09/09   Additional susceptibilities reviewed from micro lab for completeness   Plan for total 2 weeks of therapy  Given difficulty with vancomycin dosing will maintain on daptomycin  Continue daptomycin 325 mg every 48 hours  Will dose adjust antibiotics as needed  Weekly labs with CBCD, CMP and CK, ordered for tomorrow  Plan for total of 2 weeks of therapy through 9/15  Continue oral vancomycin 125 q 6 through 9/18  Last doses of antibiotics ordered  Discontinue PICC line after IV antibiotics completed  No formal follow-up required in ID office  ID office staff notified of the above  Additional supportive care/discharge as per primary     3  Colitis on CT images and diarrhea   Patient was having diarrhea which may be related to 6  Ultimately cannot rule out C diff either   PCR positive     Continue oral vancomycin 125 q 6 through 9/18  Last doses of antibiotics ordered  Monitor abdominal exam  Continue to trend fever curve/WBC     4  Abnormal UA  Patient without symptoms   Asymptomatic bacteriuria  No antibiotics continued for this issue      5  Transaminitis  Stable   I suspect that this is related to volume losses   Continue to trend for now while on daptomycin         6  Acute anemia and prior GI bleeding   Patient's hemoglobin had acutely decreased     She has had prior bleeding marginal ulcers which has contributed to recurrent admissions chronic TPN use   Also present on EGD on 09/02   Status post PEG tube on 09/08  Antibiotics as above  Tube feeds as per primary/GI  Ongoing follow-up by Nutrition  Palliative care evaluation appreciated        7  Chronic TPN use  Has been on since admission in June to optimize her nutrition   Unfortunately now her course has been complicated by bacteremia   Now transitioned to PEG tube feeding   Plans to remove PICC line after antibiotics completed  PEG tube feeding as per primary/GI  Remove PICC line after antibiotics completed as above  Palliative care evaluation appreciated     Above plan discussed with patient and with primary service  ID consult service will continue to follow      Antibiotics:  Daptomycin 5  Total antibiotic 14  Oral vancomycin 15    Subjective:  Patient seen at bedside and she denied having any nausea, vomiting, chest pain, shortness of breath  She still was reporting some abdominal bloating/discomfort  Peg tube site unremarkable  She reported some pain associated with the to but seems to note that it is more with movement  I did relay the information to primary service  We discussed continued antibiotic  She denies any itching or rash to antibiotic currently      Objective:  Vitals:  Temp:  [97 4 °F (36 3 °C)-99 4 °F (37 4 °C)] 99 4 °F (37 4 °C)  HR:  [79-97] 97  Resp:  [16-26] 26  BP: ()/(51-52) 95/52  SpO2:  [93 %-96 %] 93 %  Temp (24hrs), Av 1 °F (36 7 °C), Min:97 4 °F (36 3 °C), Max:99 4 °F (37 4 °C)  Current: Temperature: 99 4 °F (37 4 °C)    Physical Exam:   General Appearance:  Alert, interactive, nontoxic, no acute distress  Chronically ill-appearing and frail  Throat: Oropharynx moist without lesions  Lungs:   Clear to auscultation bilaterally; no wheezes, rhonchi or rales; respirations unlabored on room air   Heart:  RRR; no murmur, rub or gallop appreciated   Abdomen:   Soft, non-tender, mildly distended, positive bowel sounds  Peg tube insertion site appears unremarkable  Extremities: No clubbing, cyanosis or edema   Skin: No new rashes or lesions  No new draining wounds noted  Labs, Imaging, & Other studies:   All pertinent labs and imaging studies were personally reviewed  Results from last 7 days   Lab Units 22  0539 22  0647 22  0553   WBC Thousand/uL 12 22* 12 43* 10 74*   HEMOGLOBIN g/dL 7 7* 7 7* 7 6*   PLATELETS Thousands/uL 483* 454* 443*     Results from last 7 days   Lab Units 22  0539 22  0647 22  0553 09/10/22  0605   POTASSIUM mmol/L 4 9 4 4   < > 3 8   CHLORIDE mmol/L 101 101   < > 107   CO2 mmol/L 26 26   < > 23   BUN mg/dL 22 18   < > 15   CREATININE mg/dL 1 47* 1 42*   < > 1 61*   EGFR ml/min/1 73sq m 33 35   < > 30   CALCIUM mg/dL 8 0* 7 9*   < > 8 0*   AST U/L 76* 50*  --  31   ALT U/L 42 30  --  23   ALK PHOS U/L 226* 199*  --  161*    < > = values in this interval not displayed

## 2022-09-13 NOTE — ASSESSMENT & PLAN NOTE
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

## 2022-09-13 NOTE — ASSESSMENT & PLAN NOTE
· Palliative on board  Family meeting held with prior provider:  patient has had a change of heart and wants to be txt focused and proceed with PEG placement   She states that she realized that she is not ready to die  · She remains full code

## 2022-09-13 NOTE — OCCUPATIONAL THERAPY NOTE
Occupational Therapy Progress Note     Patient Name: Noemy CACERES'S Date: 9/13/2022  Problem List  Principal Problem:    Sepsis [facility])  Active Problems:    H/O bariatric surgery - bypass    NOAH (acute kidney injury) ([facility] )    Ambulatory dysfunction    Moderate protein-calorie malnutrition ([facility] )    Bacteremia    Acute on chronic anemia    Goals of care, counseling/discussion    Acute encephalopathy            09/13/22 0855   OT Last Visit   OT Visit Date 09/13/22   Note Type   Note Type Treatment   Restrictions/Precautions   Weight Bearing Precautions Per Order No   Other Precautions Contact/isolation;Cognitive; Bed Alarm; Fall Risk;Pain;Multiple lines   Pain Assessment   Pain Score 8   Pain Location/Orientation Location: Abdomen   ADL   Where Assessed Edge of bed   Eating Assistance 5  Supervision/Setup   Eating Comments set up   [address] 4  Minimal Assistance   UB Dressing Deficit Thread RUE; Thread LUE;Pull around back   LB Dressing Assistance 2  Maximal Assistance   LB Dressing Deficit Don/doff R sock; Don/doff L sock   LB Dressing Comments unable to bend or complete compensatory techniques   Functional Standing Tolerance   Comments unable to assess - pt declines standing this date 2* pain despite encouragment   Bed Mobility   Supine to Sit 3  Moderate assistance   Additional items Assist x 1; Increased time required;LE management;Verbal cues   Sit to Supine 4  Minimal assistance   Additional items Assist x 1; Increased time required;Verbal cues;LE management   Additional Comments Pt tolerates sitting EOB approx 10 mins before c/o pain and discpmfort and requests retutn to supine   Cognition   Overall Cognitive Status Impaired   Arousal/Participation Alert; Responsive   Attention Attends with cues to redirect   Orientation Level Oriented to person;Oriented to place;Oriented to time;Disoriented to situation   Memory Decreased recall of recent events   Following Commands Follows one step commands with increased time or repetition   Comments Increased encouragment and education is required for participation  Pt is limited by pain and become easily agitated at times  Not accepting of some education provided this date  Activity Tolerance   Activity Tolerance Patient limited by fatigue;Patient limited by pain   Medical Staff Made Aware NSG aware   Assessment   Assessment Pt was seen this date for OT tx session focusing on self care tasks, bed mobility, sitting balance and tolerance, safety awareness, compensatory techniques, energy conservation, and overall activity tolerance  Pt presents supine and is agreeable to OT tx session  Pt completes previously mentioned tasks at documented assist levels please see above in flow sheet  Pt continues to require increased physical assist for functional tasks  Pt is overall limited by decreased balance, increased pain, increased fatigue, decreased strength, endurance, and activity tolerance and continues to function below baseline level  Pt resting in supine at end of session with all needs in reach and alarm on  Pt would benefit from continued OT Tx to improve overall functional abilities and increase independence  Will continue to follow with current POC  Plan   Treatment Interventions ADL retraining;Functional transfer training; Endurance training;UE strengthening/ROM; Cognitive reorientation;Patient/family training;Equipment evaluation/education; Compensatory technique education;Continued evaluation; Energy conservation; Activityengagement   Goal Expiration Date 09/27/22  (Spoke to OTR/L  Goals per initial eval have not yet been met and remain appropriate   Will extend additional 14 days 2* same )   OT Treatment Day 1   OT Frequency 3-5x/wk   Recommendation   OT Discharge Recommendation Post acute rehabilitation services   AM-PAC Daily Activity Inpatient   Lower Body Dressing 2   Bathing 2   Toileting 2   Upper Body Dressing 3   Grooming 3   Eating 4   Daily Activity Raw Score 16   Daily Activity Standardized Score (Calc for Raw Score >=11) 35 96   AM-PAC Applied Cognition Inpatient   Following a Speech/Presentation 2   Understanding Ordinary Conversation 3   Taking Medications 2   Remembering Where Things Are Placed or Put Away 2   Remembering List of 4-5 Errands 2   Taking Care of Complicated Tasks 1   Applied Cognition Raw Score 12   Applied Cognition Standardized Score 28 82

## 2022-09-13 NOTE — ASSESSMENT & PLAN NOTE
· Resolved  · WBC Of 12 5 after PEG feed was started but no fever, chills, tachycardia or tachypnea  · Will obtain ESR  CRP and procalcitonin to help tailor need for antibacterial indication   · Initial source was bacteremia, C diff colitis  ID appreciated  · + evidence of colitis on ct scan  Status post C diff panel; positive toxin PCR but negative EIA  On p o  Vancomycin through 9/18  Diarrhea resolved  · Positive recurrent bacteremia  Presumed secondary to PICC line with use of TPN  Prior history of recent MSSE  · Repeat blood cx neg after 5 days  · Discontinued presenting PICC  New PICC ordered (9/9) which will also be dc'ed after abx is completed  · Continue IV daptomycin through 9/15  · Ur cx polymicrobial but non significant colony ct   Treatment not indicated  · Cont to trend wbc

## 2022-09-14 PROBLEM — R10.12 LEFT UPPER QUADRANT ABDOMINAL PAIN: Status: ACTIVE | Noted: 2022-09-14

## 2022-09-14 NOTE — PROGRESS NOTES
Progress Note - Infectious Disease   Pepito Simmons 76 y o  female MRN: [id number]  Unit/Bed#: [facility] 317-01 Encounter: [id number]      Impression/Plan:  1  Sepsis, POA  Patient noted with brisk leukocytosis along with fever  Multiple sources at this time including 2, 3 and 6  Clinically improved   White count now remains slightly elevated which may either be reactive, volume related or possibly drug related with peripheral eosinophilia noted  No overt allergic symptoms  Continue IV and oral antibiotic as below  Duration of antibiotic therapy as below  Continue to trend fever curve/vitals  Last doses of antibiotics ordered  Palliative care evaluation appreciated  Additional supportive care/discharge as per primary     2  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  One of 2 cultures have now returned positive, 2 different sub species   Patient had previous episode of line-related bacteremia  Ultimately difficult to exclude recurrence in the setting of 7  PICC line removed on 09/06  Repeat cultures NG  2D echo unremarkable  Transesophageal echo negative   PICC line replaced on 09/09   Additional susceptibilities reviewed from micro lab for completeness   Plan for total 2 weeks of therapy   Given difficulty with vancomycin dosing will maintain on daptomycin  Repeat labs reviewed  Continue daptomycin 325 mg every 48 hours  Plan for total of 2 weeks of therapy through 9/15  Continue oral vancomycin 125 q 6 through 9/18  Last doses of antibiotics ordered  Discontinue PICC line after IV antibiotics completed  No formal follow-up required in ID office  ID office staff notified of the above  Additional supportive care/discharge as per primary     3  Colitis on CT images and diarrhea   Patient was having diarrhea which may be related to 6  Ultimately cannot rule out C diff either   PCR positive     Continue oral vancomycin 125 q 6 through 9/18  Last doses of antibiotics ordered  Monitor abdominal exam  Continue to trend fever curve/vitals     4  Abnormal UA  Patient without symptoms   Asymptomatic bacteriuria  No antibiotics continued for this issue      5  Transaminitis  Stable   I suspect that this is related to volume losses   Complete course of daptomycin as above        6  Acute anemia and prior GI bleeding   Patient's hemoglobin had acutely decreased     She has had prior bleeding marginal ulcers which has contributed to recurrent admissions chronic TPN use   Also present on EGD on 09/02   Status post PEG tube on 09/08  Antibiotics as above  Tube feeds as per primary/GI  Ongoing follow-up by Nutrition  Palliative care evaluation appreciated        7  Chronic TPN use  Has been on since admission in June to optimize her nutrition   Unfortunately now her course has been complicated by bacteremia   Now transitioned to PEG tube feeding   Plans to remove PICC line after antibiotics completed  She is complaining of some degree of abdominal bloating  PEG tube feeding as per primary/GI  Remove PICC line after antibiotics completed as above  Palliative care evaluation appreciated  Follow-up abdominal x-ray  Consider tube check  Consider slowing rate of feeding  Recommend bowel regimen     Above plan discussed briefly with the patient and with primary service  ID consult service will continue to follow      Antibiotics:  Daptomycin 6  Total antibiotic 15  Oral vancomycin 16    Subjective:  Patient seen at bedside and she denied having any nausea, vomiting, chest pain  She is still complaining of abdominal discomfort particularly around her PEG tube site  She notices it more with movement  She self reports feeling distended  Discussed with nursing and patient has not had a bowel movement in a few days  They had to stop tube feeding overnight due to discomfort  Discussed with primary service potentially above verifying tube placement and bowel regimen      Objective:  Vitals:  Temp:  [98 7 °F (37 1 °C)-99 °F (37 2 °C)] 99 °F (37 2 °C)  HR:  [84-92] 84  Resp:  [12-14] 12  BP: (106-128)/(52-60) 108/56  SpO2:  [90 %-96 %] 94 %  Temp (24hrs), Av 9 °F (37 2 °C), Min:98 7 °F (37 1 °C), Max:99 °F (37 2 °C)  Current: Temperature: 99 °F (37 2 °C)    Physical Exam:   General Appearance:  Alert, interactive, nontoxic, no acute distress  Chronically ill-appearing and frail  Throat: Oropharynx moist without lesions  Lungs:   Clear to auscultation bilaterally; no wheezes, rhonchi or rales; respirations unlabored on room air   Heart:  RRR; no murmur, rub or gallop appreciated   Abdomen:   Soft, mild discomfort when I palpate around the PEG tube, slightly distended but appears stable compared to yesterday, bowel sounds present, no guarding or rebound    Extremities: No clubbing, cyanosis or edema   Skin: No new rashes or lesions  No new draining wounds noted         Labs, Imaging, & Other studies:   All pertinent labs and imaging studies were personally reviewed  Results from last 7 days   Lab Units 22  0857 22  0539 22  0647   WBC Thousand/uL 10 63* 12 22* 12 43*   HEMOGLOBIN g/dL 7 2* 7 7* 7 7*   PLATELETS Thousands/uL 438* 483* 454*     Results from last 7 days   Lab Units 22  0857 22  0539 22  0647   POTASSIUM mmol/L 4 7 4 9 4 4   CHLORIDE mmol/L 102 101 101   CO2 mmol/L 27 26 26   BUN mg/dL 22 22 18   CREATININE mg/dL 1 52* 1 47* 1 42*   EGFR ml/min/1 73sq m 32 33 35   CALCIUM mg/dL 7 9* 8 0* 7 9*   AST U/L 51* 76* 50*   ALT U/L 37 42 30   ALK PHOS U/L 176* 226* 199*

## 2022-09-14 NOTE — PLAN OF CARE
Problem: Potential for Falls  Goal: Patient will remain free of falls  Description: INTERVENTIONS:  - Educate patient/family on patient safety including physical limitations  - Instruct patient to call for assistance with activity   - Consult OT/PT to assist with strengthening/mobility   - Keep Call bell within reach  - Keep bed low and locked with side rails adjusted as appropriate  - Keep care items and personal belongings within reach  - Initiate and maintain comfort rounds  - Make Fall Risk Sign visible to staff  - Offer Toileting every Hours, in advance of need  - Initiate/Maintain alarm  - Obtain necessary fall risk management equipment:   - Apply yellow socks and bracelet for high fall risk patients  - Consider moving patient to room near nurses station  Outcome: Progressing     Problem: INFECTION - ADULT  Goal: Absence or prevention of progression during hospitalization  Description: INTERVENTIONS:  - Assess and monitor for signs and symptoms of infection  - Monitor lab/diagnostic results  - Monitor all insertion sites, i e  indwelling lines, tubes, and drains  - Monitor endotracheal if appropriate and nasal secretions for changes in amount and color  - [place] appropriate cooling/warming therapies per order  - Administer medications as ordered  - Instruct and encourage patient and family to use good hand hygiene technique  - Identify and instruct in appropriate isolation precautions for identified infection/condition  Outcome: Progressing     Problem: SAFETY ADULT  Goal: Patient will remain free of falls  Description: INTERVENTIONS:  - Educate patient/family on patient safety including physical limitations  - Instruct patient to call for assistance with activity   - Consult OT/PT to assist with strengthening/mobility   - Keep Call bell within reach  - Keep bed low and locked with side rails adjusted as appropriate  - Keep care items and personal belongings within reach  - Initiate and maintain comfort rounds  - Make Fall Risk Sign visible to staff  - Offer Toileting every Hours, in advance of need  - Initiate/Maintain alarm  - Obtain necessary fall risk management equipment:   - Apply yellow socks and bracelet for high fall risk patients  - Consider moving patient to room near nurses station  Outcome: Progressing     Problem: Prexisting or High Potential for Compromised Skin Integrity  Goal: Skin integrity is maintained or improved  Description: INTERVENTIONS:  - Identify patients at risk for skin breakdown  - Assess and monitor skin integrity  - Assess and monitor nutrition and hydration status  - Monitor labs   - Assess for incontinence   - Turn and reposition patient  - Assist with mobility/ambulation  - Relieve pressure over bony prominences  - Avoid friction and shearing  - Provide appropriate hygiene as needed including keeping skin clean and dry  - Evaluate need for skin moisturizer/barrier cream  - Collaborate with interdisciplinary team   - Patient/family teaching  - Consider wound care consult   Outcome: Progressing

## 2022-09-14 NOTE — UTILIZATION REVIEW
Continued Stay Review    Date: 09/14                         Current Patient Class: IP Current Level of Care: MS    HPI:77 y o  female initially admitted on 08/31/2022    Assessment/Plan: Overnight, pt c/o worsening abdominal pain and swelling, TF were stopped  KUB pending  Miralax, Milk of magnesia and suppositories added  Hold TF, switched to CLD  WBC down trending  Cr up to 1 52, trial Albumin with gentle fluids  Cont IVF  Cont IV Dapto, po vanco  Cont to trend fever curve, WBC  ID and palliative following      Vital Signs: /56 (BP Location: Left arm)   Pulse 84   Temp 99 °F (37 2 °C) (Oral)   Resp 12   Ht 5' 4" (1 626 m)   Wt 54 kg (119 lb 0 8 oz)   SpO2 94%   BMI 20 43 kg/m²       Pertinent Labs/Diagnostic Results:       Results from last 7 days   Lab Units 09/14/22  0857 09/13/22  0539 09/12/22  0647 09/11/22  0553 09/10/22  0605   WBC Thousand/uL 10 63* 12 22* 12 43* 10 74* 11 80*   HEMOGLOBIN g/dL 7 2* 7 7* 7 7* 7 6* 7 8*   HEMATOCRIT % 24 0* 25 4* 24 8* 24 7* 25 2*   PLATELETS Thousands/uL 438* 483* 454* 443* 449*   NEUTROS ABS Thousands/µL 6 39 8 54*  --   --   --          Results from last 7 days   Lab Units 09/14/22  0857 09/13/22  0539 09/12/22  0647 09/11/22  0553 09/10/22  0605   SODIUM mmol/L 133* 131* 130* 136 136   POTASSIUM mmol/L 4 7 4 9 4 4 3 8 3 8   CHLORIDE mmol/L 102 101 101 108 107   CO2 mmol/L 27 26 26 24 23   ANION GAP mmol/L 4 4 3* 4 6   BUN mg/dL 22 22 18 14 15   CREATININE mg/dL 1 52* 1 47* 1 42* 1 37* 1 61*   EGFR ml/min/1 73sq m 32 33 35 36 30   CALCIUM mg/dL 7 9* 8 0* 7 9* 7 3* 8 0*     Results from last 7 days   Lab Units 09/14/22  0857 09/13/22  0539 09/12/22  0647 09/10/22  0605   AST U/L 51* 76* 50* 31   ALT U/L 37 42 30 23   ALK PHOS U/L 176* 226* 199* 161*   TOTAL PROTEIN g/dL 6 9 7 0 6 9 6 7   ALBUMIN g/dL 1 6* 1 7* 1 6* 1 6*   TOTAL BILIRUBIN mg/dL 0 29 0 25 0 24 0 15*   BILIRUBIN DIRECT mg/dL  --   --  0 12 0 12     Results from last 7 days   Lab Units 09/14/22  1613 09/14/22  1229 09/14/22  0622 09/13/22  1807 09/13/22  0606 09/12/22  1838 09/12/22  0756 09/11/22  1122 09/11/22  0643 09/11/22  0021 09/10/22  1715 09/10/22  1133   POC GLUCOSE mg/dl 108 120 104 113 128 106 105 150* 134 114 103 103     Results from last 7 days   Lab Units 09/14/22  0857 09/13/22  0539 09/12/22  0647 09/11/22  0553 09/10/22  0605 09/08/22  0551   GLUCOSE RANDOM mg/dL 128 146* 124 110 102 90     Results from last 7 days   Lab Units 09/12/22  1622   OSMOLALITY, SERUM mmol/       Results from last 7 days   Lab Units 09/14/22  0857 09/10/22  0605   CK TOTAL U/L 9* 19*           Results from last 7 days   Lab Units 09/13/22  0539 09/12/22  1525   PROCALCITONIN ng/ml 0 27* 0 27*           Results from last 7 days   Lab Units 09/12/22  1525   CRP mg/L 56 5*   SED RATE mm/hour 46*         Results from last 7 days   Lab Units 09/12/22  1622   OSMOLALITY, SERUM mmol/       Medications:   Scheduled Medications:  alteplase, 2 mg, Intracatheter, Once  clonazePAM, 3 mg, Oral, HS  DAPTOmycin, 6 mg/kg, Intravenous, T04T  folic acid, 1 mg, Oral, Daily  heparin (porcine), 5,000 Units, Subcutaneous, Q8H RHONA  levothyroxine, 112 mcg, Oral, Early Morning  midodrine, 5 mg, Oral, TID AC  pantoprazole, 40 mg, Oral, BID AC  sucralfate, 1 g, Oral, 4x Daily (AC & HS)  thiamine, 100 mg, Oral, Daily  traZODone, 150 mg, Oral, HS  vancomycin, 125 mg, Oral, Q6H RHONA      Continuous IV Infusions:  sodium chloride, 50 mL/hr, Intravenous, Continuous      PRN Meds:  acetaminophen, 650 mg, Oral, Q6H PRN  ALPRAZolam, 0 25 mg, Oral, TID PRN  magnesium hydroxide, 15 mL, Oral, Daily PRN 09/14 x 1  ondansetron, 4 mg, Intravenous, Q6H PRN  oxyCODONE, 2 5 mg, Oral, Q6H PRN  polyethylene glycol, 17 g, Oral, Daily PRN        Discharge Plan: D    Network Utilization Review Department  ATTENTION: Please call with any questions or concerns to [phone] and carefully listen to the prompts so that you are directed to the right person  All voicemails are confidential   [place] [place] all requests for admission clinical reviews, approved or denied determinations and any other requests to dedicated fax number below belonging to the campus where the patient is receiving treatment   List of dedicated fax numbers for the Facilities:  [address] DENIALS (Administrative/Medical Necessity) [phone]   [address] (Maternity/NICU/Pediatrics) [phone]   917 Yess[address] Liriano [phone]   [facility]  [phone]   1306 [facility] [address] [address] Finesse Morales [facility] 28 [phone]   1553 [facility] 134 815 [facility] [phone]

## 2022-09-14 NOTE — ASSESSMENT & PLAN NOTE
Creatinine of 1 42 to 1 47 to 1 52 today noted continued up trend suspect related to prerenal intravascular volume depletion with possible nephritis component from past IV vanco  Consider nephrology continued to worsen  Trial albumin with gentle fluids   Avoid all possible nephrotoxins   Monitor

## 2022-09-14 NOTE — ASSESSMENT & PLAN NOTE
States has been present for past several days, currently unclear etiology but suspect constipation related  Holding TF, switch to CLD until further eval  KUB pending  Miralax, Milk of Mg, suppositories added

## 2022-09-14 NOTE — ASSESSMENT & PLAN NOTE
· Patient with history of gastric bypass surgery  Has had significant malnutrition issues and anastomotic ulcerations  Seen by Bariatric Service during recent hospitalization at [facility] who recommended prolonged course of TPN for nutritional optimization and possible revision of surgery in the future  · Was planned to have appt with bariatric sx on 8/20 however had to be rescheduled  · Given recurrent bacteremia; s/p GI consult; recommended discontinuation of TPN  S/p PEG, TF initiated, held overnight due to abd pain  · P o   Diet for pleasure feeding - holding  · To f/u with bariatric surgery

## 2022-09-14 NOTE — ASSESSMENT & PLAN NOTE
· Palliative on board  Family meeting held with prior provider:  patient has had a change of heart and wants to be txt focused and proceed with PEG placement   She states that she realized that she is not ready to die  · Discussed that if kidney continue to worsen and/or she does not tolerate tube feeding that palliative/hospice will be in her near future  · She remains full code

## 2022-09-14 NOTE — PROGRESS NOTES
[facility]  Progress Note - Greg Mora 1945, 76 y o  female MRN: [id number]  Unit/Bed#: [facility] 317-01 Encounter: [id number]  Primary Care Provider: Zachary Roberts MD   Date and time admitted to hospital: 8/30/2022 11:49 PM    Left upper quadrant abdominal pain  Assessment & Plan  States has been present for past several days, currently unclear etiology but suspect constipation related  Holding TF, switch to CLD until further eval  KUB pending  Miralax, Milk of Mg, suppositories added    Acute encephalopathy  Assessment & Plan  · Resolved  · Secondary to hospital induced delirium vs metabolic encephalopathy  · No focal deficit on exam  · Delirium precautions      Goals of care, counseling/discussion  Assessment & Plan  · Palliative on board  Family meeting held with prior provider:  patient has had a change of heart and wants to be txt focused and proceed with PEG placement  She states that she realized that she is not ready to die  · Discussed that if kidney continue to worsen and/or she does not tolerate tube feeding that palliative/hospice will be in her near future  · She remains full code      Acute on chronic anemia  Assessment & Plan  · Evaluated by GI prior  · 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  · S/p recent EGD in 7/22 revealing: Residual marginal ulcer of the gastrojejunostomy anastomosis with improved size  · S/p EGD 9/2 revealing: Large, cratered ulcer in the gastrojejunal anastomosis   · Received PRBC transfusion x 1, hemoglobin stable over past 48 hours  · Typed and screened for possible transfusion once hgb <7 0  · Cont PPI, BID, carafate    Bacteremia  Assessment & Plan  · Recent hx of staph epi bacteremia in 7/2022, presumed due to picc line  · Now with possible recurrence 1/2 set staph epi, 2nd set no growth thus far  · Had been on IV vancomycin but discontinued due to fluctuating levels    Thus was transitioned to IV daptomycin to continue through 9/15  · PICC access changed on 9/9/22  · S/p TAVIA (9/9) no evidence of vegetation  · Repeat blood cx neg x 5 days   · TPN discontinued  S/p PEG placement  · DC iv access before dc  · Appreciate continued ID following      Moderate protein-calorie malnutrition (HCC)  Assessment & Plan  Malnutrition Findings:   Adult Malnutrition type: Chronic illness  Adult Degree of Malnutrition: Other severe protein calorie malnutrition  Malnutrition Characteristics: Weight loss, Muscle loss     On Jevity 1 2 - ** * holding due to abdominal pain  Tolerating it well at goal of 69 cc/hr from 6 pm to 6am   Checked CMP and it shows hyponatremia of 130, low albumin but mildly elevated AST and A phos  Will obtain serum osmolality and will defer to nutrition for rec    360 Statement: Severe/Chronic malnutrition r/t condition, as evidenced by 4 8% wt loss x < 1 week (9/2/22: 125#, 9/5/22: 119#), and severe muscle mass depletion (temples/clavicle)  Treated with liberalized diet and nutrition supplements, possibly nutrition support pending goals of care    BMI Findings: Body mass index is 20 43 kg/m²  Hx of Savage-en Y gastric bypass, 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  Now off due to recurrent bacteremia  S/p PEG placement and on PO feeds  Cont nutritional supplements    Ambulatory dysfunction  Assessment & Plan  · Exacerbated by diarrhea/dehydration however patient is chronically malnourished  · S/p PT/OT evaluation; to return to SNF  · Family/PT wishing alternate SNF  CM on board to find replacement    NOAH (acute kidney injury) ([facility] [address] )  Assessment & Plan  Creatinine of 1 42 to 1 47 to 1 52 today noted continued up trend suspect related to prerenal intravascular volume depletion with possible nephritis component from past IV vanco  Consider nephrology continued to worsen  Trial albumin with gentle fluids   Avoid all possible nephrotoxins   Monitor    H/O bariatric surgery - bypass  Assessment & Plan  · Patient with history of gastric bypass surgery  Has had significant malnutrition issues and anastomotic ulcerations  Seen by Bariatric Service during recent hospitalization at [facility] who recommended prolonged course of TPN for nutritional optimization and possible revision of surgery in the future  · Was planned to have appt with bariatric sx on 8/20 however had to be rescheduled  · Given recurrent bacteremia; s/p GI consult; recommended discontinuation of TPN  S/p PEG, TF initiated, held overnight due to abd pain  · P o  Diet for pleasure feeding - holding  · To f/u with bariatric surgery      * Sepsis ([facility] [address] )  Assessment & Plan  · Resolved  · WBC Of 12 5 after PEG feed was started but no fever, chills, tachycardia or tachypnea  · Will obtain ESR  CRP and procalcitonin to help tailor need for antibacterial indication   · Initial source was bacteremia, C diff colitis  ID appreciated  · + evidence of colitis on ct scan  Status post C diff panel; positive toxin PCR but negative EIA  On p o  Vancomycin through 9/18  Diarrhea resolved  · Positive recurrent bacteremia  Presumed secondary to PICC line with use of TPN  Prior history of recent MSSE  · Repeat blood cx neg after 5 days  · Discontinued presenting PICC  New PICC ordered (9/9) which will also be dc'ed after abx is completed  · Continue IV daptomycin through 9/15  · Ur cx polymicrobial but non significant colony ct  Treatment not indicated  · Cont to trend wbc            VTE Pharmacologic Prophylaxis: VTE Score: 3 Moderate Risk (Score 3-4) - Pharmacological DVT Prophylaxis Ordered: heparin  Patient Centered Rounds: I performed bedside rounds with nursing staff today  Discussions with Specialists or Other Care Team Provider: KEITH SPRING    Education and Discussions with Family / Patient: Updated  (brother) at bedside  Time Spent for Care: 60 minutes   More than 50% of total time spent on counseling and coordination of care as described above  Current Length of Stay: 14 day(s)  Current Patient Status: Inpatient   Certification Statement: The patient will continue to require additional inpatient hospital stay due to worsening renal function, intolerence of feeding  Discharge Plan: pending course    Code Status: Level 1 - Full Code    Subjective:   Patient seen and examined at bedside  Overnight patient complained of wrosening abdominal pain and swelling and tube feeds were stopped  Objective:     Vitals:   Temp (24hrs), Av 9 °F (37 2 °C), Min:98 7 °F (37 1 °C), Max:99 °F (37 2 °C)    Temp:  [98 7 °F (37 1 °C)-99 °F (37 2 °C)] 99 °F (37 2 °C)  HR:  [84-92] 84  Resp:  [12-14] 12  BP: (106-128)/(52-60) 108/56  SpO2:  [90 %-96 %] 94 %  Body mass index is 20 43 kg/m²  Input and Output Summary (last 24 hours): Intake/Output Summary (Last 24 hours) at 2022 1658  Last data filed at 2022 1547  Gross per 24 hour   Intake 20 ml   Output 550 ml   Net -530 ml       Physical Exam:   Physical Exam  Constitutional:       Comments: Elderly female, chronically ill, cachectic   Cardiovascular:      Rate and Rhythm: Normal rate and regular rhythm  Pulses: Normal pulses  Heart sounds: Normal heart sounds  Pulmonary:      Effort: Pulmonary effort is normal  No respiratory distress  Breath sounds: Normal breath sounds  No wheezing or rales  Abdominal:      Palpations: Abdomen is soft  Comments: Mildly distended, left-sided PEG tube in place without surrounding erythema or discharge, mild LUQ firmness/tenderness to deep palpation without rebound or guarding   Musculoskeletal:         General: Normal range of motion  Right lower leg: No edema  Left lower leg: No edema  Skin:     General: Skin is warm and dry  Neurological:      General: No focal deficit present  Mental Status: She is alert and oriented to person, place, and time  Cranial Nerves: No cranial nerve deficit        Motor: No weakness  Additional Data:     Labs:  Results from last 7 days   Lab Units 09/14/22  0857   WBC Thousand/uL 10 63*   HEMOGLOBIN g/dL 7 2*   HEMATOCRIT % 24 0*   PLATELETS Thousands/uL 438*   NEUTROS PCT % 61   LYMPHS PCT % 21   MONOS PCT % 8   EOS PCT % 9*     Results from last 7 days   Lab Units 09/14/22  0857   SODIUM mmol/L 133*   POTASSIUM mmol/L 4 7   CHLORIDE mmol/L 102   CO2 mmol/L 27   BUN mg/dL 22   CREATININE mg/dL 1 52*   ANION GAP mmol/L 4   CALCIUM mg/dL 7 9*   ALBUMIN g/dL 1 6*   TOTAL BILIRUBIN mg/dL 0 29   ALK PHOS U/L 176*   ALT U/L 37   AST U/L 51*   GLUCOSE RANDOM mg/dL 128         Results from last 7 days   Lab Units 09/14/22  1613 09/14/22  1229 09/14/22  0622 09/13/22  1807 09/13/22  0606 09/12/22  1838 09/12/22  0756 09/11/22  1122 09/11/22  0643 09/11/22  0021 09/10/22  1715 09/10/22  1133   POC GLUCOSE mg/dl 108 120 104 113 128 106 105 150* 134 114 103 103         Results from last 7 days   Lab Units 09/13/22  0539 09/12/22  1525   PROCALCITONIN ng/ml 0 27* 0 27*       Lines/Drains:  Invasive Devices  Report    Peripherally Inserted Central Catheter Line  Duration           PICC Line 09/09/22 Right Brachial 5 days          Drain  Duration           External Urinary Catheter 39 days    Rectal Tube With balloon 14 days    Gastrostomy/Enterostomy Percutaneous Endoscopic Gastrostomy (PEG) 20 Fr  LUQ 6 days                Central Line:  Goal for removal: Will discontinue when meds requiring line are completed  Imaging: No pertinent imaging reviewed      Recent Cultures (last 7 days):         Last 24 Hours Medication List:   Current Facility-Administered Medications   Medication Dose Route Frequency Provider Last Rate   • acetaminophen  650 mg Oral Q6H PRN Jovita Mcdermott, DO     • ALPRAZolam  0 25 mg Oral TID PRN Iliana Chick, DO     • alteplase  2 mg Intracatheter Once [employer], PA-C     • clonazePAM  3 mg Oral HS Jovita Mcdermott, DO     • DAPTOmycin  6 mg/kg Intravenous Q48H David Tillman  mg (90/46/76 1752)   • folic acid  1 mg Oral Daily Jovita Mcdermott, DO     • heparin (porcine)  5,000 Units Subcutaneous [facility] Gerhardt Grams, DO     • levothyroxine  112 mcg Oral Early Morning Jovita Mcdermott, DO     • magnesium hydroxide  15 mL Oral Daily PRN Isidra Lopez MD     • midodrine  5 mg Oral TID AC Jovitakash Mcdermott, DO     • ondansetron  4 mg Intravenous Q6H PRN Hayes Booker DO     • oxyCODONE  2 5 mg Oral Q6H PRN Alfonso Saba MD     • pantoprazole  40 mg Oral BID AC Kaylan Arroyo DO     • polyethylene glycol  17 g Oral Daily PRN Isidra Lopez MD     • sodium chloride  50 mL/hr Intravenous Continuous Isidra Lopez MD 50 mL/hr (09/14/22 8987)   • sucralfate  1 g Oral 4x Daily (AC & HS) Gerhardt Grams, DO     • thiamine  100 mg Oral Daily Jovita Mcdermott, DO     • traZODone  150 mg Oral HS Jovita Baldemar, DO     • vancomycin  125 mg Oral Q6H Zulma Littlejohn MD          Today, Patient Was Seen By: Isidra Lopez MD    **Please Note: This note may have been constructed using a voice recognition system  **

## 2022-09-14 NOTE — ASSESSMENT & PLAN NOTE
Malnutrition Findings:   Adult Malnutrition type: Chronic illness  Adult Degree of Malnutrition: Other severe protein calorie malnutrition  Malnutrition Characteristics: Weight loss, Muscle loss     On Jevity 1 2 - ** * holding due to abdominal pain  Tolerating it well at goal of 69 cc/hr from 6 pm to 6am   Checked CMP and it shows hyponatremia of 130, low albumin but mildly elevated AST and A phos  Will obtain serum osmolality and will defer to nutrition for rec    360 Statement: Severe/Chronic malnutrition r/t condition, as evidenced by 4 8% wt loss x < 1 week (9/2/22: 125#, 9/5/22: 119#), and severe muscle mass depletion (temples/clavicle)  Treated with liberalized diet and nutrition supplements, possibly nutrition support pending goals of care    BMI Findings: Body mass index is 20 43 kg/m²  Hx of Savage-en Y gastric bypass, 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

## 2022-09-14 NOTE — PLAN OF CARE
Problem: Potential for Falls  Goal: Patient will remain free of falls  Description: INTERVENTIONS:  - Educate patient/family on patient safety including physical limitations  - Instruct patient to call for assistance with activity   - Consult OT/PT to assist with strengthening/mobility   - Keep Call bell within reach  - Keep bed low and locked with side rails adjusted as appropriate  - Keep care items and personal belongings within reach  - Initiate and maintain comfort rounds  - Make Fall Risk Sign visible to staff  - Apply yellow socks and bracelet for high fall risk patients  - Consider moving patient to room near nurses station  Outcome: Progressing     Problem: MOBILITY - ADULT  Goal: Maintain or return to baseline ADL function  Description: INTERVENTIONS:  -  Assess patient's ability to carry out ADLs; assess patient's baseline for ADL function and identify physical deficits which impact ability to perform ADLs (bathing, care of mouth/teeth, toileting, grooming, dressing, etc )  - Assess/evaluate cause of self-care deficits   - Assess range of motion  - Assess patient's mobility; develop plan if impaired  - Assess patient's need for assistive devices and provide as appropriate  - Encourage maximum independence but intervene and supervise when necessary  - Involve family in performance of ADLs  - Assess for home care needs following discharge   - Consider OT consult to assist with ADL evaluation and planning for discharge  - Provide patient education as appropriate  Outcome: Progressing     Problem: SAFETY ADULT  Goal: Patient will remain free of falls  Description: INTERVENTIONS:  - Educate patient/family on patient safety including physical limitations  - Instruct patient to call for assistance with activity   - Consult OT/PT to assist with strengthening/mobility   - Keep Call bell within reach  - Keep bed low and locked with side rails adjusted as appropriate  - Keep care items and personal belongings within reach  - Initiate and maintain comfort rounds  - Make Fall Risk Sign visible to staff  - Apply yellow socks and bracelet for high fall risk patients  - Consider moving patient to room near nurses station  Outcome: Progressing  Goal: Maintain or return to baseline ADL function  Description: INTERVENTIONS:  -  Assess patient's ability to carry out ADLs; assess patient's baseline for ADL function and identify physical deficits which impact ability to perform ADLs (bathing, care of mouth/teeth, toileting, grooming, dressing, etc )  - Assess/evaluate cause of self-care deficits   - Assess range of motion  - Assess patient's mobility; develop plan if impaired  - Assess patient's need for assistive devices and provide as appropriate  - Encourage maximum independence but intervene and supervise when necessary  - Involve family in performance of ADLs  - Assess for home care needs following discharge   - Consider OT consult to assist with ADL evaluation and planning for discharge  - Provide patient education as appropriate  Outcome: Progressing     Problem: DISCHARGE PLANNING  Goal: Discharge to home or other facility with appropriate resources  Description: INTERVENTIONS:  - Identify barriers to discharge w/patient and caregiver  - Arrange for needed discharge resources and transportation as appropriate  - Identify discharge learning needs (meds, wound care, etc )  - Arrange for interpretive services to assist at discharge as needed  - Refer to Case Management Department for coordinating discharge planning if the patient needs post-hospital services based on physician/advanced practitioner order or complex needs related to functional status, cognitive ability, or social support system  Outcome: Progressing     Problem: Knowledge Deficit  Goal: Patient/family/caregiver demonstrates understanding of disease process, treatment plan, medications, and discharge instructions  Description: Complete learning assessment and assess knowledge base    Interventions:  - Provide teaching at level of understanding  - Provide teaching via preferred learning methods  Outcome: Progressing     Problem: SKIN/TISSUE INTEGRITY - ADULT  Goal: Incision(s), wounds(s) or drain site(s) healing without S/S of infection  Description: INTERVENTIONS  - Assess and document dressing, incision, wound bed, drain sites and surrounding tissue  - Provide patient and family education  Outcome: Progressing

## 2022-09-15 NOTE — PLAN OF CARE
Problem: PHYSICAL THERAPY ADULT  Goal: Performs mobility at highest level of function for planned discharge setting  See evaluation for individualized goals  Description:    Equipment Recommended:  (RW)       See flowsheet documentation for full assessment, interventions and recommendations  Outcome: Progressing  Note: Prognosis: Fair  Problem List: Decreased strength, Decreased endurance, Impaired balance, Decreased mobility, Decreased cognition, Impaired judgement, Decreased safety awareness, Pain  Assessment: Pt presents for PT treatment session consisting of bed mobility and functional transfers  Pt is making slow progress toward her goals due to decreased activity tolerance and resistance to participate in mobility  Pt required significant encouragement today in order to agree to participate in any mobility tasks  Upon agreement, pt was able to move to EOB and felt onset of dizziness  Pt was able to sit EOB with supervision and agreed to perform one stand once dizziness resolved as long as she was provided with help  Pt expressed increased fatigue following one STS and requested return to supine  Pt is mostly self-limiting at this time as she is very resistant to participate throughout  Pt was educated on the risks of immobility; pt acknowledged and stated she would like to be left alone  Pt will continue to benefit from skilled PT to return to PLOF  PT to continue to follow and recommend discharge to rehab  The patient's AM-PAC Basic Mobility Inpatient Short Form Raw Score is 9  A Raw score of less than or equal to 16 suggests the patient may benefit from discharge to post-acute rehabilitation services  Please also refer to the recommendation of the Physical Therapist for safe discharge planning    Barriers to Discharge: Inaccessible home environment, Decreased caregiver support     PT Discharge Recommendation: Return to facility with rehabilitation services    See flowsheet documentation for full assessment

## 2022-09-15 NOTE — PHYSICAL THERAPY NOTE
PHYSICAL THERAPY NOTE          Patient Name: Bernabe Linda  JXPFB'F Date: 9/15/2022       09/15/22 1108   PT Last Visit   PT Visit Date 09/15/22   Note Type   Note Type Treatment   Pain Assessment   Pain Assessment Tool 0-10   Pain Score 8   Pain Location/Orientation Location: Abdomen   Restrictions/Precautions   Weight Bearing Precautions Per Order No   Other Precautions Contact/isolation;Cognitive; Chair Alarm; Bed Alarm; Fall Risk;Pain   General   Chart Reviewed Yes   Family/Caregiver Present No   Cognition   Overall Cognitive Status Impaired   Arousal/Participation Alert   Attention Attends with cues to redirect   Orientation Level Oriented X4   Memory Decreased recall of recent events;Decreased recall of precautions   Following Commands Follows one step commands with increased time or repetition   Comments Pt is very resistant to participate and requires a lot of encouragement and reinforcement to agree to mobilize  Pt tangential throughout requiring redirection and presents with overall decreased safety awareness and insight  Subjective   Subjective Pt agreeable to participate with much encouragement  Bed Mobility   Supine to Sit 3  Moderate assistance   Additional items Assist x 1;HOB elevated; Increased time required;Verbal cues   Sit to Supine 3  Moderate assistance   Additional items Assist x 1; Increased time required;Verbal cues   Additional Comments Pt supine in bed upon PT consult  Pt sat EOB ~ 7 minutes with supervision  Pt left supine in bed with bed alarm intact and all needs within reach  Transfers   Sit to Stand 3  Moderate assistance   Additional items Assist x 2; Increased time required;Verbal cues   Stand to Sit 4  Minimal assistance   Additional items Assist x 2; Increased time required;Verbal cues   Additional Comments Pt transfers w/ b/l HHA   Verbal cues for hand placement and both encouragement and initiation of stand  Pt was able to achieve one full stand ~ 7 seconds before needing to sit  Pt scooted right towards HOB one time with MinAx1  Balance   Static Sitting Fair   Dynamic Sitting Poor   Endurance Deficit   Endurance Deficit Yes   Endurance Deficit Description Pt able to complete very little mobility throughout due to weakness and fatigue  Activity Tolerance   Activity Tolerance Patient limited by fatigue   Medical Staff Made Aware OT Cheryl Ramirez   Nurse Made Aware RN cleared pt to be seen by PT  Assessment   Prognosis Fair   Problem List Decreased strength;Decreased endurance; Impaired balance;Decreased mobility; Decreased cognition; Impaired judgement;Decreased safety awareness;Pain   Assessment Pt presents for PT treatment session consisting of bed mobility and functional transfers  Pt is making slow progress toward her goals due to decreased activity tolerance and resistance to participate in mobility  Pt required significant encouragement today in order to agree to participate in any mobility tasks  Upon agreement, pt was able to move to EOB and felt onset of dizziness  Pt was able to sit EOB with supervision and agreed to perform one stand once dizziness resolved as long as she was provided with help  Pt expressed increased fatigue following one STS and requested return to supine  Pt is mostly self-limiting at this time as she is very resistant to participate throughout  Pt was educated on the risks of immobility; pt acknowledged and stated she would like to be left alone  Pt will continue to benefit from skilled PT to return to PLOF  PT to continue to follow and recommend discharge to rehab  The patient's AM-PAC Basic Mobility Inpatient Short Form Raw Score is 9  A Raw score of less than or equal to 16 suggests the patient may benefit from discharge to post-acute rehabilitation services  Please also refer to the recommendation of the Physical Therapist for safe discharge planning     Barriers to Discharge Inaccessible home environment;Decreased caregiver support   Goals   Patient Goals to be left alone   STG Expiration Date 09/29/22   Short Term Goal #1 1  Pt will perform bed mobility with Mod I for increased independence and decreased need for caregiver support  2  Pt will perform functional transfers with MinAx1 for increased independence and decreased need for caregiver support  3  Pt will ambulate 50 ft with MinAx1 and LRAD for increased ability to move around in the home and community  4  Pt will perform static and dynamic standing balance for 5 minutes with MinAx1 to demonstrate improved ability to perform daily activities in the home  5  Pt will increase balance by one grade for improved mobility and decreased risk of falls  6  Pt will increase b/l LE strength by one grade for increased ease with transfers and ambulation  Plan   Treatment/Interventions Functional transfer training;LE strengthening/ROM; Therapeutic exercise; Endurance training;Cognitive reorientation;Patient/family training;Equipment eval/education; Bed mobility;Gait training;Spoke to nursing   Progress Slow progress, decreased activity tolerance   PT Frequency 3-5x/wk   Recommendation   PT Discharge Recommendation Return to facility with rehabilitation services   Equipment Recommended   (TBD)   AM-PAC Basic Mobility Inpatient   Turning in Bed Without Bedrails 3   Lying on Back to Sitting on Edge of Flat Bed 2   Moving Bed to Chair 1   Standing Up From Chair 1   Walk in Room 1   Climb 3-5 Stairs 1   Basic Mobility Inpatient Raw Score 9   Turning Head Towards Sound 4   Follow Simple Instructions 3   Low Function Basic Mobility Raw Score 16   Low Function Basic Mobility Standardized Score 25 72   Highest Level Of Mobility   JH-HLM Goal 3: Sit at edge of bed   JH-HLM Achieved 3: Sit at edge of bed   Education   Education Provided Mobility training   Patient Reinforcement needed   End of Consult   Patient Position at End of Consult Supine;Bed/Chair alarm activated; All needs within reach     Corina Smith, SPT

## 2022-09-15 NOTE — OCCUPATIONAL THERAPY NOTE
Occupational Therapy Progress Note     Patient Name: Leigh Ann Gill  YFSEB'Z Date: 9/15/2022  Problem List  Principal Problem:    Sepsis ([facility] )  Active Problems:    H/O bariatric surgery - bypass    NOAH (acute kidney injury) ([facility] )    Ambulatory dysfunction    Moderate protein-calorie malnutrition ([facility] )    Bacteremia    Acute on chronic anemia    Goals of care, counseling/discussion    Acute encephalopathy    Left upper quadrant abdominal pain            09/15/22 1105   OT Last Visit   OT Visit Date 09/15/22   Note Type   Note Type Treatment   Restrictions/Precautions   Weight Bearing Precautions Per Order No   Other Precautions Contact/isolation;Cognitive; Bed Alarm; Fall Risk;Pain   Pain Assessment   Pain Score 8   Pain Location/Orientation Location: Abdomen   ADL   Where Assessed Supine, bed   UB Dressing Assistance 4  Minimal Assistance   UB Dressing Deficit Thread RUE; Thread LUE   LB Dressing Assistance 3  Moderate Assistance   LB Dressing Deficit Don/doff R sock; Don/doff L sock   Functional Standing Tolerance   Time ~20 seconds   Activity static standing   Comments HHA x 2   Bed Mobility   Supine to Sit 3  Moderate assistance   Additional items Assist x 1; Increased time required   Sit to Supine 3  Moderate assistance   Additional items Assist x 1; Increased time required   Additional Comments Pt sat EOB approx 7 mins   Transfers   Sit to Stand 3  Moderate assistance   Additional items Assist x 2   Stand to Sit 4  Minimal assistance   Additional items Assist x 2   Additional Comments increased TC and VC required   Cognition   Overall Cognitive Status Impaired   Arousal/Participation Alert; Responsive   Attention Attends with cues to redirect   Orientation Level Oriented X4   Memory Decreased recall of recent events   Following Commands Follows one step commands with increased time or repetition   Comments Continues to require increased encouragment to particiapte   Activity Tolerance   Activity Tolerance Patient limited by fatigue;Patient limited by pain   Medical Staff Made Aware NSG aware   Assessment   Assessment Pt was seen this date for OT tx session focusing on self care tasks, bed mobility, sit to stand progressions, standing tolerance,  safety awareness, compensatory techniques, energy conservation, and overall activity tolerance  Pt presents supine and is agreeable to OT tx session  Pt completes previously mentioned tasks at documented assist levels please see above in flow sheet  Pt continues to require overall min- mod A x 1-2 for transfers and mod A for self care tasks  Pt is overall limited by  increased fatigue, decreased strength, endurance, and activity tolerance and continues to function below baseline level  Pt resting in supineat end of session with all needs in reach and alarm on  Pt would benefit from continued OT Tx to improve overall functional abilities and increase independence  Will continue to follow with current POC  Plan   Treatment Interventions ADL retraining;Functional transfer training; Endurance training;UE strengthening/ROM; Cognitive reorientation;Patient/family training;Equipment evaluation/education; Compensatory technique education;Continued evaluation; Energy conservation; Activityengagement   Goal Expiration Date 09/27/22   OT Treatment Day 2   OT Frequency 3-5x/wk   Recommendation   OT Discharge Recommendation Post acute rehabilitation services  (vs LTC)   AM-PAC Daily Activity Inpatient   Lower Body Dressing 2   Bathing 2   Toileting 2   Upper Body Dressing 3   Grooming 3   Eating 4   Daily Activity Raw Score 16   Daily Activity Standardized Score (Calc for Raw Score >=11) 35 96   AM-PAC Applied Cognition Inpatient   Following a Speech/Presentation 2   Understanding Ordinary Conversation 3   Taking Medications 2   Remembering Where Things Are Placed or Put Away 2   Remembering List of 4-5 Errands 2   Taking Care of Complicated Tasks 1   Applied Cognition Raw Score 12   Applied Cognition Standardized Score 28 82

## 2022-09-15 NOTE — ASSESSMENT & PLAN NOTE
· Recent hx of staph epi bacteremia in 7/2022, presumed due to picc line  · Now with possible recurrence 1/2 set staph epi, 2nd set no growth thus far  · Had been on IV vancomycin but discontinued due to fluctuating levels  Completed vancomycin to daptomycin courses 9/15  · S/p TAVIA (9/9) no evidence of vegetation  · Repeat blood cx neg x 5 days   · TPN discontinued   S/p PEG placement  · DC iv access before dc  · Appreciate continued ID following

## 2022-09-15 NOTE — ASSESSMENT & PLAN NOTE
Creatinine of 1 42 to 1 47 to 1 52, improved to 1 39 today after a few min in gentle hydration yesterday suggesting prerenal volume depletion as main etiology  Continue monitoring with low threshold for further gentle fluids  Avoid all possible nephrotoxins   Monitor

## 2022-09-15 NOTE — ASSESSMENT & PLAN NOTE
Malnutrition Findings:   Adult Malnutrition type: Chronic illness  Adult Degree of Malnutrition: Other severe protein calorie malnutrition  Malnutrition Characteristics: Weight loss, Muscle loss     On Jevity 1 2 - resume at lower max rate   Checked CMP and it shows hyponatremia of 130, low albumin but mildly elevated AST and A phos    360 Statement: Severe/Chronic malnutrition r/t condition, as evidenced by 4 8% wt loss x < 1 week (9/2/22: 125#, 9/5/22: 119#), and severe muscle mass depletion (temples/clavicle)  Treated with liberalized diet and nutrition supplements, possibly nutrition support pending goals of care    BMI Findings: Body mass index is 18 66 kg/m²  Hx of Savage-en Y gastric bypass, 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

## 2022-09-15 NOTE — ASSESSMENT & PLAN NOTE
· Palliative on board  Family meeting held with prior provider:  patient has had a change of heart and wants to be txt focused and proceed with PEG placement  She states that she realized that she is not ready to die  · 9/15:  Montrell Dawkins discussion held with patient regarding likely outcomes of either cardiac arrest or respiratory failure requiring intubation given her current clinical state    Patient states she wishes to be DNR DNI

## 2022-09-15 NOTE — PLAN OF CARE
Problem: INFECTION - ADULT  Goal: Absence or prevention of progression during hospitalization  Description: INTERVENTIONS:  - Assess and monitor for signs and symptoms of infection  - Monitor lab/diagnostic results  - Monitor all insertion sites, i e  indwelling lines, tubes, and drains  - Monitor endotracheal if appropriate and nasal secretions for changes in amount and color  - [place] appropriate cooling/warming therapies per order  - Administer medications as ordered  - Instruct and encourage patient and family to use good hand hygiene technique  - Identify and instruct in appropriate isolation precautions for identified infection/condition  Outcome: Progressing     Problem: MOBILITY - ADULT  Goal: Maintain or return to baseline ADL function  Description: INTERVENTIONS:  -  Assess patient's ability to carry out ADLs; assess patient's baseline for ADL function and identify physical deficits which impact ability to perform ADLs (bathing, care of mouth/teeth, toileting, grooming, dressing, etc )  - Assess/evaluate cause of self-care deficits   - Assess range of motion  - Assess patient's mobility; develop plan if impaired  - Assess patient's need for assistive devices and provide as appropriate  - Encourage maximum independence but intervene and supervise when necessary  - Involve family in performance of ADLs  - Assess for home care needs following discharge   - Consider OT consult to assist with ADL evaluation and planning for discharge  - Provide patient education as appropriate  Outcome: Progressing     Problem: SAFETY ADULT  Goal: Patient will remain free of falls  Description: INTERVENTIONS:  - Educate patient/family on patient safety including physical limitations  - Instruct patient to call for assistance with activity   - Consult OT/PT to assist with strengthening/mobility   - Keep Call bell within reach  - Keep bed low and locked with side rails adjusted as appropriate  - Keep care items and personal belongings within reach  - Initiate and maintain comfort rounds  - Make Fall Risk Sign visible to staff  - Offer Toileting every *2** Hours, in advance of need  - Initiate/Maintain **bed*alarm  - Apply yellow socks and bracelet for high fall risk patients  - Consider moving patient to room near nurses station  Outcome: Progressing     Problem: DISCHARGE PLANNING  Goal: Discharge to home or other facility with appropriate resources  Description: INTERVENTIONS:  - Identify barriers to discharge w/patient and caregiver  - Arrange for needed discharge resources and transportation as appropriate  - Identify discharge learning needs (meds, wound care, etc )  - Arrange for interpretive services to assist at discharge as needed  - Refer to Case Management Department for coordinating discharge planning if the patient needs post-hospital services based on physician/advanced practitioner order or complex needs related to functional status, cognitive ability, or social support system  Outcome: Progressing

## 2022-09-15 NOTE — ASSESSMENT & PLAN NOTE
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

## 2022-09-15 NOTE — ASSESSMENT & PLAN NOTE
Likely secondary to severe constipation, patient with small bowel movements today with significant improvement in pain  KUB suggestive of large volume stool burden    Continue Miralax, Milk of Mg, suppositories added

## 2022-09-15 NOTE — PLAN OF CARE
Problem: OCCUPATIONAL THERAPY ADULT  Goal: Performs self-care activities at highest level of function for planned discharge setting  See evaluation for individualized goals  Description: Treatment Interventions: ADL retraining, Functional transfer training, Endurance training, UE strengthening/ROM, Cognitive reorientation, Patient/family training, Equipment evaluation/education, Compensatory technique education, Continued evaluation, Energy conservation, Activityengagement          See flowsheet documentation for full assessment, interventions and recommendations  Outcome: Progressing  Note: Limitation: Decreased ADL status, Decreased endurance, Decreased self-care trans, Decreased high-level ADLs  Prognosis: Fair  Assessment: Pt was seen this date for OT tx session focusing on self care tasks, bed mobility, sit to stand progressions, standing tolerance,  safety awareness, compensatory techniques, energy conservation, and overall activity tolerance  Pt presents supine and is agreeable to OT tx session  Pt completes previously mentioned tasks at documented assist levels please see above in flow sheet  Pt continues to require overall min- mod A x 1-2 for transfers and mod A for self care tasks  Pt is overall limited by  increased fatigue, decreased strength, endurance, and activity tolerance and continues to function below baseline level  Pt resting in supineat end of session with all needs in reach and alarm on  Pt would benefit from continued OT Tx to improve overall functional abilities and increase independence  Will continue to follow with current POC       OT Discharge Recommendation: Post acute rehabilitation services (vs LTC)

## 2022-09-15 NOTE — PROGRESS NOTES
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  One of 2 cultures have now returned positive, 2 different sub species   Patient had previous episode of line-related bacteremia  Ultimately difficult to exclude recurrence in the setting of 7  PICC line removed on 09/06  Repeat cultures NG  2D echo unremarkable  Transesophageal echo negative   PICC line replaced on 09/09   Additional susceptibilities reviewed from micro lab for completeness   Plan for total 2 weeks of therapy   Given difficulty with vancomycin dosing maintained on daptomycin  Repeat labs improved  Complete daptomycin today, last doses ordered  Continue oral vancomycin 125 q 6 through 9/18  Discontinue PICC line after IV antibiotic completed/at discharge  No formal follow-up required in ID office  ID office staff notified of the above  Additional supportive care/discharge as per primary     3  Colitis on CT images and diarrhea   Patient was having diarrhea which may be related to 6  Ultimately cannot rule out C diff either  PCR positive     Continue oral vancomycin 125 q 6 through 9/18  Last doses of antibiotics ordered  Monitor abdominal exam  Continue to trend fever curve/vitals     4  Abnormal UA  Patient without symptoms   Asymptomatic bacteriuria  No antibiotics continued for this issue      5   Nashua Downer suspect that this is related to volume losses   Complete course of daptomycin as above        6  Acute anemia and prior GI bleeding   Patient's hemoglobin had acutely decreased     She has had prior bleeding marginal ulcers which has contributed to recurrent admissions chronic TPN use   Also present on EGD on 09/02   Status post PEG tube on 09/08  Antibiotics as above  Tube feeds as per primary/GI  Ongoing follow-up by Nutrition  Palliative care evaluation appreciated        7  Chronic TPN use  Has been on since admission in June to optimize her nutrition   Unfortunately now her course has been complicated by bacteremia   Now transitioned to PEG tube feeding   Plans to remove PICC line after antibiotics completed  She is complaining of some degree of abdominal bloating  X-ray with constipation  On bowel regimen now  PEG tube feeding as per primary/GI  Remove PICC line after antibiotics completed as above  Palliative care evaluation appreciated  Monitor abdominal exam  Continue bowel regimen     Above plan discussed briefly with the patient and with her brother  Given completion of IV antibiotic today, ID consult service will sign off at this time  Please call if questions      Antibiotics:  Daptomycin 7  Total antibiotic 16  Oral vancomycin 17    Subjective:  Patient seen at bedside this morning and she denied having any nausea, vomiting, chest pain  She was reporting still having some abdominal discomfort but did have a bowel movement yesterday again she seems to localize her discomfort just at the PEG tube site  Otherwise appears well and white count has normalized  Patient's brother was present outside and I provided brief updates  Unfortunately the patient has been intermittently refusing visitors or has been getting upset with her family  He expressed some frustration but plans to visit her again tomorrow      Objective:  Vitals:  Temp:  [97 5 °F (36 4 °C)-98 °F (36 7 °C)] 98 °F (36 7 °C)  HR:  [82-91] 82  Resp:  [14-18] 17  BP: (106-133)/(47-88) 124/65  SpO2:  [91 %-94 %] 94 %  Temp (24hrs), Av 8 °F (36 6 °C), Min:97 5 °F (36 4 °C), Max:98 °F (36 7 °C)  Current: Temperature: 98 °F (36 7 °C)    Physical Exam:   General Appearance:  Alert, interactive, nontoxic, no acute distress  Chronically ill-appearing  Throat: Oropharynx moist without lesions  Lungs:   Clear to auscultation bilaterally; no wheezes, rhonchi or rales; respirations unlabored on room air   Heart:  RRR; no murmur, rub or gallop appreciated   Abdomen:   Soft, mild discomfort really only at the PEG tube site, slight distension but less than yesterday  Bowel sounds present  Extremities: No clubbing, cyanosis or edema   Skin: No new rashes or lesions  No new draining wounds noted  PICC line insertion site unremarkable         Labs, Imaging, & Other studies:   All pertinent labs and imaging studies were personally reviewed  Results from last 7 days   Lab Units 09/15/22  0556 22  0857 22  0539   WBC Thousand/uL 9 49 10 63* 12 22*   HEMOGLOBIN g/dL 7 0* 7 2* 7 7*   PLATELETS Thousands/uL 447* 438* 483*     Results from last 7 days   Lab Units 09/15/22  0556 22  0857 22  0539 22  0647   POTASSIUM mmol/L 4 5 4 7 4 9 4 4   CHLORIDE mmol/L 104 102 101 101   CO2 mmol/L 26 27 26 26   BUN mg/dL 19 22 22 18   CREATININE mg/dL 1 39* 1 52* 1 47* 1 42*   EGFR ml/min/1 73sq m 36 32 33 35   CALCIUM mg/dL 8 2* 7 9* 8 0* 7 9*   AST U/L  --  51* 76* 50*   ALT U/L  --  37 42 30   ALK PHOS U/L  --  176* 226* 199*

## 2022-09-15 NOTE — ASSESSMENT & PLAN NOTE
· Patient with history of gastric bypass surgery  Has had significant malnutrition issues and anastomotic ulcerations  Seen by Bariatric Service during recent hospitalization at [facility] who recommended prolonged course of TPN for nutritional optimization and possible revision of surgery in the future  · Was planned to have appt with bariatric sx on 8/20 however had to be rescheduled  · Given recurrent bacteremia; s/p GI consult; recommended discontinuation of TPN  S/p PEG, TF initiated, held overnight due to abd pain  · P o   Diet for pleasure feeding resumed  · To f/u with bariatric surgery

## 2022-09-15 NOTE — PROGRESS NOTES
[address] [facility]  Progress Note - Tacho Cuello 1945, 76 y o  female MRN: [id number]  Unit/Bed#: [facility] 317-01 Encounter: [id number]  Primary Care Provider: Angelica Sheth MD   Date and time admitted to hospital: 8/30/2022 11:49 PM    Left upper quadrant abdominal pain  Assessment & Plan  Likely secondary to severe constipation, patient with small bowel movements today with significant improvement in pain  KUB suggestive of large volume stool burden    Continue Miralax, Milk of Mg, suppositories added    Acute encephalopathy  Assessment & Plan  · Resolved  · Secondary to hospital induced delirium vs metabolic encephalopathy  · No focal deficit on exam  · Delirium precautions      Goals of care, counseling/discussion  Assessment & Plan  · Palliative on board  Family meeting held with prior provider:  patient has had a change of heart and wants to be txt focused and proceed with PEG placement  She states that she realized that she is not ready to die  · 9/15:  Anastasia Stevenson discussion held with patient regarding likely outcomes of either cardiac arrest or respiratory failure requiring intubation given her current clinical state    Patient states she wishes to be DNR DNI      Acute on chronic anemia  Assessment & Plan  · Evaluated by GI prior  · 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  · S/p recent EGD in 7/22 revealing: Residual marginal ulcer of the gastrojejunostomy anastomosis with improved size  · S/p EGD 9/2 revealing: Large, cratered ulcer in the gastrojejunal anastomosis   · Received PRBC transfusion x 1, hemoglobin trending down 7 0 today continue monitoring transfuse less than 7  · Typed and screened for possible transfusion once hgb <7 0  · Cont PPI, BID, carafate    Bacteremia  Assessment & Plan  · Recent hx of staph epi bacteremia in 7/2022, presumed due to picc line  · Now with possible recurrence 1/2 set staph epi, 2nd set no growth thus far  · Had been on IV vancomycin but discontinued due to fluctuating levels  Completed vancomycin to daptomycin courses 9/15  · S/p TAVIA (9/9) no evidence of vegetation  · Repeat blood cx neg x 5 days   · TPN discontinued  S/p PEG placement  · DC iv access before dc  · Appreciate continued ID following      Moderate protein-calorie malnutrition (HCC)  Assessment & Plan  Malnutrition Findings:   Adult Malnutrition type: Chronic illness  Adult Degree of Malnutrition: Other severe protein calorie malnutrition  Malnutrition Characteristics: Weight loss, Muscle loss     On Jevity 1 2 - resume at lower max rate   Checked CMP and it shows hyponatremia of 130, low albumin but mildly elevated AST and A phos    360 Statement: Severe/Chronic malnutrition r/t condition, as evidenced by 4 8% wt loss x < 1 week (9/2/22: 125#, 9/5/22: 119#), and severe muscle mass depletion (temples/clavicle)  Treated with liberalized diet and nutrition supplements, possibly nutrition support pending goals of care    BMI Findings: Body mass index is 18 66 kg/m²  Hx of Savage-en Y gastric bypass, 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  Now off due to recurrent bacteremia  S/p PEG placement and on PO feeds  Cont nutritional supplements    Ambulatory dysfunction  Assessment & Plan  · Exacerbated by diarrhea/dehydration however patient is chronically malnourished  · S/p PT/OT evaluation; to return to SNF  · Family/PT wishing alternate SNF  CM on board to find replacement    NOAH (acute kidney injury) ([facility] [address] )  Assessment & Plan  Creatinine of 1 42 to 1 47 to 1 52, improved to 1 39 today after a few min in gentle hydration yesterday suggesting prerenal volume depletion as main etiology  Continue monitoring with low threshold for further gentle fluids  Avoid all possible nephrotoxins  Monitor    H/O bariatric surgery - bypass  Assessment & Plan  · Patient with history of gastric bypass surgery  Has had significant malnutrition issues and anastomotic ulcerations  Seen by Bariatric Service during recent hospitalization at [facility] who recommended prolonged course of TPN for nutritional optimization and possible revision of surgery in the future  · Was planned to have appt with bariatric sx on 8/20 however had to be rescheduled  · Given recurrent bacteremia; s/p GI consult; recommended discontinuation of TPN  S/p PEG, TF initiated, held overnight due to abd pain  · P o  Diet for pleasure feeding resumed  · To f/u with bariatric surgery      * Sepsis ([facility] [address] )  Assessment & Plan  · Resolved  · WBC Of 12 5 after PEG feed was started but no fever, chills, tachycardia or tachypnea  · Will obtain ESR  CRP and procalcitonin to help tailor need for antibacterial indication   · Initial source was bacteremia, C diff colitis  ID appreciated  · + evidence of colitis on ct scan  Status post C diff panel; positive toxin PCR but negative EIA  On p o  Vancomycin through 9/18  Diarrhea resolved  · Positive recurrent bacteremia  Presumed secondary to PICC line with use of TPN  Prior history of recent MSSE  · Repeat blood cx neg after 5 days  · Discontinued presenting PICC  New PICC ordered (9/9) which will also be dc'ed after abx is completed  · Continue IV daptomycin through 9/15  · Ur cx polymicrobial but non significant colony ct  Treatment not indicated  · Cont to trend wbc            VTE Pharmacologic Prophylaxis: VTE Score: 3 Moderate Risk (Score 3-4) - Pharmacological DVT Prophylaxis Ordered: heparin  Patient Centered Rounds: I performed bedside rounds with nursing staff today  Discussions with Specialists or Other Care Team Provider: CM    Education and Discussions with Family / Patient: Patient declined call to   Time Spent for Care: 30 minutes  More than 50% of total time spent on counseling and coordination of care as described above      Current Length of Stay: 15 day(s)  Current Patient Status: Inpatient   Certification Statement: The patient will continue to require additional inpatient hospital stay due to Continue renal function, hemoglobin monitoring, rehab placement  Discharge Plan: Anticipate discharge in 48-72 hrs to rehab facility  Code Status: Level 3 - DNAR and DNI    Subjective:   Patient seen and examined at bedside  No acute events overnight  For small bowel movement earlier today along with significant improvement in abdominal pain/discomfort  Patient states she is otherwise feeling well  Objective:     Vitals:   Temp (24hrs), Av 8 °F (36 6 °C), Min:97 5 °F (36 4 °C), Max:98 °F (36 7 °C)    Temp:  [97 5 °F (36 4 °C)-98 °F (36 7 °C)] 98 °F (36 7 °C)  HR:  [82-91] 82  Resp:  [14-18] 17  BP: (106-133)/(47-88) 124/65  SpO2:  [91 %-94 %] 94 %  Body mass index is 18 66 kg/m²  Input and Output Summary (last 24 hours): Intake/Output Summary (Last 24 hours) at 9/15/2022 1821  Last data filed at 9/15/2022 0845  Gross per 24 hour   Intake 0 ml   Output 1450 ml   Net -1450 ml       Physical Exam:   Physical Exam  Constitutional:       Comments: Elderly female, chronically ill, cachectic   Cardiovascular:      Rate and Rhythm: Normal rate and regular rhythm  Pulses: Normal pulses  Heart sounds: Normal heart sounds  Pulmonary:      Effort: Pulmonary effort is normal  No respiratory distress  Breath sounds: Normal breath sounds  No wheezing or rales  Abdominal:      Palpations: Abdomen is soft  Comments: Mildly distended, left-sided PEG tube in place without surrounding erythema or discharge, improved tenderness no rebound or guarding   Musculoskeletal:         General: Normal range of motion  Right lower leg: No edema  Left lower leg: No edema  Skin:     General: Skin is warm and dry  Neurological:      General: No focal deficit present  Mental Status: She is alert and oriented to person, place, and time  Cranial Nerves: No cranial nerve deficit  Motor: No weakness            Additional Data: Labs:  Results from last 7 days   Lab Units 09/15/22  0556   WBC Thousand/uL 9 49   HEMOGLOBIN g/dL 7 0*   HEMATOCRIT % 23 1*   PLATELETS Thousands/uL 447*   NEUTROS PCT % 60   LYMPHS PCT % 20   MONOS PCT % 9   EOS PCT % 10*     Results from last 7 days   Lab Units 09/15/22  0556 09/14/22  0857   SODIUM mmol/L 134* 133*   POTASSIUM mmol/L 4 5 4 7   CHLORIDE mmol/L 104 102   CO2 mmol/L 26 27   BUN mg/dL 19 22   CREATININE mg/dL 1 39* 1 52*   ANION GAP mmol/L 4 4   CALCIUM mg/dL 8 2* 7 9*   ALBUMIN g/dL  --  1 6*   TOTAL BILIRUBIN mg/dL  --  0 29   ALK PHOS U/L  --  176*   ALT U/L  --  37   AST U/L  --  51*   GLUCOSE RANDOM mg/dL 94 128         Results from last 7 days   Lab Units 09/15/22  1809 09/15/22  0604 09/15/22  0033 09/14/22  1613 09/14/22  1229 09/14/22  0622 09/13/22  1807 09/13/22  0606 09/12/22  1838 09/12/22  0756 09/11/22  1122 09/11/22  0643   POC GLUCOSE mg/dl 93 106 98 108 120 104 113 128 106 105 150* 134         Results from last 7 days   Lab Units 09/13/22  0539 09/12/22  1525   PROCALCITONIN ng/ml 0 27* 0 27*       Lines/Drains:  Invasive Devices  Report    Peripherally Inserted Central Catheter Line  Duration           PICC Line 09/09/22 Right Brachial 6 days          Drain  Duration           External Urinary Catheter 40 days    Rectal Tube With balloon 15 days    Gastrostomy/Enterostomy Percutaneous Endoscopic Gastrostomy (PEG) 20 Fr  LUQ 7 days                Central Line:  Goal for removal: No longer needed  Will place order to discontinue               Imaging: Personally reviewed the following imaging: KUB x-ray    Recent Cultures (last 7 days):         Last 24 Hours Medication List:   Current Facility-Administered Medications   Medication Dose Route Frequency Provider Last Rate   • acetaminophen  650 mg Oral Q6H PRN Jovita Mcdermott DO     • ALPRAZolam  0 25 mg Oral TID PRN Ramy Dickerson DO     • alteplase  2 mg Intracatheter Once [facility] YNES Sevilla     • bisacodyl  10 mg Rectal Daily PRN Di Plasencia MD     • clonazePAM  3 mg Oral HS Jovita Mcdermott, DO     • folic acid  1 mg Oral Daily Jovita Mcdermott, DO     • heparin (porcine)  5,000 Units Subcutaneous [facility] Jeff Bentley, DO     • levothyroxine  112 mcg Oral Early Morning Jovita Mcdermott, DO     • magnesium hydroxide  30 mL Oral Daily PRN Di Plasencia MD     • midodrine  5 mg Oral TID AC Jovita Mcdermott, DO     • ondansetron  4 mg Intravenous Q6H PRN Imtiaz Pugh, DO     • oxyCODONE  2 5 mg Oral Q6H PRN Gillian Pretty MD     • pantoprazole  40 mg Oral BID AC Kaylan Arroyo, DO     • polyethylene glycol  17 g Oral Daily PRN Di Plasencia MD     • sucralfate  1 g Oral 4x Daily (AC & HS) Jeff Ped, DO     • thiamine  100 mg Oral Daily Jovita Mcdermott, DO     • traZODone  150 mg Oral HS Jovita Mcdermott, DO     • vancomycin  125 mg Oral Q6H [address] Ekaterina Lake MD          Today, Patient Was Seen By: Di Plasencia MD    **Please Note: This note may have been constructed using a voice recognition system  **

## 2022-09-16 NOTE — ASSESSMENT & PLAN NOTE
· Resolved  · WBC Of 12 5 after PEG feed was started but no fever, chills, tachycardia or tachypnea  · Will obtain ESR  CRP and procalcitonin to help tailor need for antibacterial indication   · Initial source was bacteremia, C diff colitis  ID appreciated  · + evidence of colitis on ct scan  Status post C diff panel; positive toxin PCR but negative EIA  On p o  Vancomycin through 9/18  Diarrhea resolved  · Positive recurrent bacteremia  Presumed secondary to PICC line with use of TPN  Prior history of recent MSSE  · Repeat blood cx neg after 5 days  · Discontinued presenting PICC  New PICC ordered (9/9) which will also be dc'ed after abx is completed  · Continue IV daptomycin through 9/15, PICC disconrtinued  · Ur cx polymicrobial but non significant colony ct   Treatment not indicated  · Cont to trend wbc

## 2022-09-16 NOTE — ASSESSMENT & PLAN NOTE
Creatinine of 1 42 to 1 47 to 1 52, improved to 1 39 -> 1 46  Suspect volume depletion as main etiology  Continue monitoring with low threshold for further gentle fluids  Avoid all possible nephrotoxins   Monitor

## 2022-09-16 NOTE — PLAN OF CARE
Problem: INFECTION - ADULT  Goal: Absence or prevention of progression during hospitalization  Description: INTERVENTIONS:  - Assess and monitor for signs and symptoms of infection  - Monitor lab/diagnostic results  - Monitor all insertion sites, i e  indwelling lines, tubes, and drains  - Monitor endotracheal if appropriate and nasal secretions for changes in amount and color  - [place] appropriate cooling/warming therapies per order  - Administer medications as ordered  - Instruct and encourage patient and family to use good hand hygiene technique  - Identify and instruct in appropriate isolation precautions for identified infection/condition  Outcome: Progressing

## 2022-09-16 NOTE — PROGRESS NOTES
[address] [facility]  Progress Note - Twila Ramirez 1945, 76 y o  female MRN: [id number]  Unit/Bed#: [facility] 317-01 Encounter: [id number]  Primary Care Provider: Juve Manuel MD   Date and time admitted to hospital: 8/30/2022 11:49 PM    Left upper quadrant abdominal pain  Assessment & Plan  Likely secondary to severe constipation, patient with small bowel movements today with significant improvement in pain  KUB suggestive of large volume stool burden    Continue Miralax, Milk of Mg, suppositories added    Acute encephalopathy  Assessment & Plan  · Resolved  · Secondary to hospital induced delirium vs metabolic encephalopathy  · No focal deficit on exam  · Delirium precautions      Goals of care, counseling/discussion  Assessment & Plan  · Palliative on board  Family meeting held with prior provider:  patient has had a change of heart and wants to be txt focused and proceed with PEG placement  She states that she realized that she is not ready to die  · 9/15:  Juan Antonio Mayfield discussion held with patient regarding likely outcomes of either cardiac arrest or respiratory failure requiring intubation given her current clinical state    Patient states she wishes to be DNR DNI      Acute on chronic anemia  Assessment & Plan  · Evaluated by GI prior  · 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  · S/p recent EGD in 7/22 revealing: Residual marginal ulcer of the gastrojejunostomy anastomosis with improved size  · S/p EGD 9/2 revealing: Large, cratered ulcer in the gastrojejunal anastomosis   · Received PRBC transfusion x 1, hemoglobin trending down 7 0 today continue monitoring transfuse less than 7  · Typed and screened for possible transfusion once hgb <7 0  · Cont PPI, BID, carafate    Bacteremia  Assessment & Plan  · Recent hx of staph epi bacteremia in 7/2022, presumed due to picc line  · Now with possible recurrence 1/2 set staph epi, 2nd set no growth thus far  · Had been on IV vancomycin but discontinued due to fluctuating levels  Completed vancomycin to daptomycin courses 9/15  · S/p TAVIA (9/9) no evidence of vegetation  · Repeat blood cx neg x 5 days   · TPN discontinued  S/p PEG placement  · DC iv access before dc  · Appreciate continued ID following      Moderate protein-calorie malnutrition (HCC)  Assessment & Plan  Malnutrition Findings:   Adult Malnutrition type: Chronic illness  Adult Degree of Malnutrition: Other severe protein calorie malnutrition  Malnutrition Characteristics: Weight loss, Muscle loss     On Jevity 1 2 - resume at lower max rate   Checked CMP and it shows hyponatremia of 130, low albumin but mildly elevated AST and A phos    360 Statement: Severe/Chronic malnutrition r/t condition, as evidenced by 4 8% wt loss x < 1 week (9/2/22: 125#, 9/5/22: 119#), and severe muscle mass depletion (temples/clavicle)  Treated with liberalized diet and nutrition supplements, possibly nutrition support pending goals of care    BMI Findings: Body mass index is 18 62 kg/m²  Hx of Savage-en Y gastric bypass, 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  Now off due to recurrent bacteremia  S/p PEG placement and on PO feeds  Cont nutritional supplements    Ambulatory dysfunction  Assessment & Plan  · Exacerbated by diarrhea/dehydration however patient is chronically malnourished  · S/p PT/OT evaluation; to return to SNF  · Family/PT wishing alternate SNF  CM on board to find replacement    NOAH (acute kidney injury) ([facility] [address] )  Assessment & Plan  Creatinine of 1 42 to 1 47 to 1 52, improved to 1 39 -> 1 46  Suspect volume depletion as main etiology  Continue monitoring with low threshold for further gentle fluids  Avoid all possible nephrotoxins  Monitor    H/O bariatric surgery - bypass  Assessment & Plan  · Patient with history of gastric bypass surgery  Has had significant malnutrition issues and anastomotic ulcerations    Seen by Bariatric Service during recent hospitalization at [facility] who recommended prolonged course of TPN for nutritional optimization and possible revision of surgery in the future  · Was planned to have appt with bariatric sx on 8/20 however had to be rescheduled  · Given recurrent bacteremia; s/p GI consult; recommended discontinuation of TPN  S/p PEG, TF initiated, held overnight due to abd pain  · P o  Diet for pleasure feeding resumed  · To f/u with bariatric surgery      * Sepsis ([facility] [address] )  Assessment & Plan  · Resolved  · WBC Of 12 5 after PEG feed was started but no fever, chills, tachycardia or tachypnea  · Will obtain ESR  CRP and procalcitonin to help tailor need for antibacterial indication   · Initial source was bacteremia, C diff colitis  ID appreciated  · + evidence of colitis on ct scan  Status post C diff panel; positive toxin PCR but negative EIA  On p o  Vancomycin through 9/18  Diarrhea resolved  · Positive recurrent bacteremia  Presumed secondary to PICC line with use of TPN  Prior history of recent MSSE  · Repeat blood cx neg after 5 days  · Discontinued presenting PICC  New PICC ordered (9/9) which will also be dc'ed after abx is completed  · Continue IV daptomycin through 9/15, PICC disconrtinued  · Ur cx polymicrobial but non significant colony ct  Treatment not indicated  · Cont to trend wbc            VTE Pharmacologic Prophylaxis: VTE Score: 3 Moderate Risk (Score 3-4) - Pharmacological DVT Prophylaxis Ordered: heparin  Patient Centered Rounds: I performed bedside rounds with nursing staff today  Discussions with Specialists or Other Care Team Provider: LISET    Education and Discussions with Family / Patient: Patient declined call to   Time Spent for Care: 45 minutes  More than 50% of total time spent on counseling and coordination of care as described above      Current Length of Stay: 16 day(s)  Current Patient Status: Inpatient   Certification Statement: The patient will continue to require additional inpatient hospital stay due to Renal function monitoring, pain control, placement  Discharge Plan: Anticipate discharge in 48-72 hrs to rehab facility  Code Status: Level 3 - DNAR and DNI    Subjective:   Patient seen and examined at bedside  No acute events overnight  Reports ongoing abdominal pain but discomfort from 2 days ago is much improved  Feels well otherwise denies shortness of breath or chest pain  Objective:     Vitals:   Temp (24hrs), Av 4 °F (36 9 °C), Min:98 °F (36 7 °C), Max:98 7 °F (37 1 °C)    Temp:  [98 °F (36 7 °C)-98 7 °F (37 1 °C)] 98 7 °F (37 1 °C)  HR:  [] 100  Resp:  [14-21] 15  BP: (104-123)/(53-58) 123/58  SpO2:  [92 %-94 %] 93 %  Body mass index is 18 62 kg/m²  Input and Output Summary (last 24 hours): Intake/Output Summary (Last 24 hours) at 2022 1823  Last data filed at 2022 0900  Gross per 24 hour   Intake --   Output 850 ml   Net -850 ml       Physical Exam:   Physical Exam  Constitutional:       Comments: Elderly female, chronically ill, cachectic   Cardiovascular:      Rate and Rhythm: Normal rate and regular rhythm  Pulses: Normal pulses  Heart sounds: Normal heart sounds  Pulmonary:      Effort: Pulmonary effort is normal  No respiratory distress  Breath sounds: Normal breath sounds  No wheezing or rales  Abdominal:      General: There is no distension  Palpations: Abdomen is soft  Comments: left-sided PEG tube in place without surrounding erythema or discharge, improved tenderness no rebound or guarding   Musculoskeletal:         General: Normal range of motion  Right lower leg: No edema  Left lower leg: No edema  Skin:     General: Skin is warm and dry  Neurological:      General: No focal deficit present  Mental Status: She is alert and oriented to person, place, and time  Cranial Nerves: No cranial nerve deficit  Motor: No weakness            Additional Data:     Labs:  Results from last 7 days   Lab Units 09/16/22  0629   WBC Thousand/uL 8 63   HEMOGLOBIN g/dL 7 4*   HEMATOCRIT % 24 4*   PLATELETS Thousands/uL 468*   NEUTROS PCT % 59   LYMPHS PCT % 19   MONOS PCT % 10   EOS PCT % 11*     Results from last 7 days   Lab Units 09/16/22  0629 09/15/22  0556 09/14/22  0857   SODIUM mmol/L 135   < > 133*   POTASSIUM mmol/L 4 5   < > 4 7   CHLORIDE mmol/L 104   < > 102   CO2 mmol/L 27   < > 27   BUN mg/dL 18   < > 22   CREATININE mg/dL 1 43*   < > 1 52*   ANION GAP mmol/L 4   < > 4   CALCIUM mg/dL 8 1*   < > 7 9*   ALBUMIN g/dL  --   --  1 6*   TOTAL BILIRUBIN mg/dL  --   --  0 29   ALK PHOS U/L  --   --  176*   ALT U/L  --   --  37   AST U/L  --   --  51*   GLUCOSE RANDOM mg/dL 99   < > 128    < > = values in this interval not displayed  Results from last 7 days   Lab Units 09/16/22  1756 09/16/22  0626 09/16/22  0041 09/15/22  1809 09/15/22  0604 09/15/22  0033 09/14/22  1613 09/14/22  1229 09/14/22  0622 09/13/22  1807 09/13/22  0606 09/12/22  1838   POC GLUCOSE mg/dl 133 106 103 93 106 98 108 120 104 113 128 106         Results from last 7 days   Lab Units 09/13/22  0539 09/12/22  1525   PROCALCITONIN ng/ml 0 27* 0 27*       Lines/Drains:  Invasive Devices  Report    Peripherally Inserted Central Catheter Line  Duration           PICC Line 09/09/22 Right Brachial 7 days          Drain  Duration           External Urinary Catheter 41 days    Rectal Tube With balloon 16 days    Gastrostomy/Enterostomy Percutaneous Endoscopic Gastrostomy (PEG) 20 Fr  LUQ 8 days                Central Line:  Goal for removal: No longer needed  Will place order to discontinue  Imaging: No pertinent imaging reviewed      Recent Cultures (last 7 days):         Last 24 Hours Medication List:   Current Facility-Administered Medications   Medication Dose Route Frequency Provider Last Rate   • acetaminophen  650 mg Oral Q6H PRN Jovita Mcdermott DO     • ALPRAZolam  0 25 mg Oral TID PRN All Pineda Cruz, DO     • alteplase  2 mg Intracatheter Once [employer], PASHARON     • bisacodyl  10 mg Rectal Daily PRN Jone Trinidad MD     • clonazePAM  3 mg Oral HS Jovita Mcdermott, DO     • folic acid  1 mg Oral Daily Jovita Mcdermott, DO     • heparin (porcine)  5,000 Units Subcutaneous [facility], DO     • levothyroxine  112 mcg Oral Early Morning Jovita Mcdermott, DO     • magnesium hydroxide  30 mL Oral Daily PRN Jone Trinidad MD     • midodrine  5 mg Oral TID AC Jovitakash Mcdermott, DO     • ondansetron  4 mg Intravenous Q6H PRN Rosi Alvarado, DO     • oxyCODONE  2 5 mg Oral Q6H Jone Trinidad MD     • pantoprazole  40 mg Oral BID AC Kaylan Miriam Arroyo, DO     • polyethylene glycol  17 g Oral Daily PRN Jone Trinidad MD     • sucralfate  1 g Oral 4x Daily (AC & HS) Lesley Hernandez, DO     • thiamine  100 mg Oral Daily Jovita Mcdermott, DO     • traZODone  150 mg Oral HS Jovita Mcdermott, DO     • vancomycin  125 mg Oral Q6H [address] Flavio Germain MD          Today, Patient Was Seen By: Jone Trinidad MD    **Please Note: This note may have been constructed using a voice recognition system  **

## 2022-09-16 NOTE — PLAN OF CARE
Problem: MOBILITY - ADULT  Goal: Maintain or return to baseline ADL function  Description: INTERVENTIONS:  -  Assess patient's ability to carry out ADLs; assess patient's baseline for ADL function and identify physical deficits which impact ability to perform ADLs (bathing, care of mouth/teeth, toileting, grooming, dressing, etc )  - Assess/evaluate cause of self-care deficits   - Assess range of motion  - Assess patient's mobility; develop plan if impaired  - Assess patient's need for assistive devices and provide as appropriate  - Encourage maximum independence but intervene and supervise when necessary  - Involve family in performance of ADLs  - Assess for home care needs following discharge   - Consider OT consult to assist with ADL evaluation and planning for discharge  - Provide patient education as appropriate  Outcome: Progressing     Problem: INFECTION - ADULT  Goal: Absence or prevention of progression during hospitalization  Description: INTERVENTIONS:  - Assess and monitor for signs and symptoms of infection  - Monitor lab/diagnostic results  - Monitor all insertion sites, i e  indwelling lines, tubes, and drains  - Monitor endotracheal if appropriate and nasal secretions for changes in amount and color  - [place] appropriate cooling/warming therapies per order  - Administer medications as ordered  - Instruct and encourage patient and family to use good hand hygiene technique  - Identify and instruct in appropriate isolation precautions for identified infection/condition  Outcome: Progressing     Problem: SAFETY ADULT  Goal: Patient will remain free of falls  Description: INTERVENTIONS:  - Educate patient/family on patient safety including physical limitations  - Instruct patient to call for assistance with activity   - Consult OT/PT to assist with strengthening/mobility   - Keep Call bell within reach  - Keep bed low and locked with side rails adjusted as appropriate  - Keep care items and personal belongings within reach  - Initiate and maintain comfort rounds  - Make Fall Risk Sign visible to staff  - Offer Toileting every **2* Hours, in advance of need  - Initiate/Maintain *bed**alarm  - Apply yellow socks and bracelet for high fall risk patients  - Consider moving patient to room near nurses station  Outcome: Progressing

## 2022-09-16 NOTE — ASSESSMENT & PLAN NOTE
· Palliative on board  Family meeting held with prior provider:  patient has had a change of heart and wants to be txt focused and proceed with PEG placement  She states that she realized that she is not ready to die  · 9/15:  Janette Sawyer discussion held with patient regarding likely outcomes of either cardiac arrest or respiratory failure requiring intubation given her current clinical state    Patient states she wishes to be DNR DNI

## 2022-09-16 NOTE — ASSESSMENT & PLAN NOTE
· Patient with history of gastric bypass surgery  Has had significant malnutrition issues and anastomotic ulcerations  Seen by Bariatric Service during recent hospitalization at [facility] who recommended prolonged course of TPN for nutritional optimization and possible revision of surgery in the future  · Was planned to have appt with bariatric sx on 8/20 however had to be rescheduled  · Given recurrent bacteremia; s/p GI consult; recommended discontinuation of TPN  S/p PEG, TF initiated, held overnight due to abd pain  · P o   Diet for pleasure feeding resumed  · To f/u with bariatric surgery

## 2022-09-16 NOTE — ASSESSMENT & PLAN NOTE
Malnutrition Findings:   Adult Malnutrition type: Chronic illness  Adult Degree of Malnutrition: Other severe protein calorie malnutrition  Malnutrition Characteristics: Weight loss, Muscle loss     On Jevity 1 2 - resume at lower max rate   Checked CMP and it shows hyponatremia of 130, low albumin but mildly elevated AST and A phos    360 Statement: Severe/Chronic malnutrition r/t condition, as evidenced by 4 8% wt loss x < 1 week (9/2/22: 125#, 9/5/22: 119#), and severe muscle mass depletion (temples/clavicle)  Treated with liberalized diet and nutrition supplements, possibly nutrition support pending goals of care    BMI Findings: Body mass index is 18 62 kg/m²  Hx of Savage-en Y gastric bypass, 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

## 2022-09-17 NOTE — ASSESSMENT & PLAN NOTE
Cr renal function remains impaired but stable  Suspect volume depletion as main etiology, support with gentle hydration if needed, patient p o  Intake improved  Avoid all possible nephrotoxins   Monitor

## 2022-09-17 NOTE — ASSESSMENT & PLAN NOTE
· Recent hx of staph epi bacteremia in 7/2022, presumed due to picc line  · Now with possible recurrence 1/2 set staph epi, 2nd set no growth thus far  · Had been on IV vancomycin but discontinued due to fluctuating levels  Completed vancomycin to daptomycin courses 9/15  · S/p TAVIA (9/9) no evidence of vegetation  · Repeat blood cx neg x 5 days   · TPN discontinued   S/p PEG placement

## 2022-09-17 NOTE — ASSESSMENT & PLAN NOTE
Likely secondary to severe constipation, patient with small bowel movements today with significant improvement in pain  KUB suggestive of large volume stool burden, this improved with constipation however patient continues to complain of constant pain    Suspected etiologies include residual pain from C diff colitis though this should be resolved by now versus secondary to irritation of stomach from PEG tube placement    Continue Miralax, Milk of Mg, suppositories added

## 2022-09-17 NOTE — ASSESSMENT & PLAN NOTE
· Palliative on board  Family meeting held with prior provider:  patient has had a change of heart and wants to be txt focused and proceed with PEG placement  She states that she realized that she is not ready to die  · 9/15:  Haydee Porras discussion held with patient regarding likely outcomes of either cardiac arrest or respiratory failure requiring intubation given her current clinical state    Patient states she wishes to be DNR DNI with aggressive care

## 2022-09-17 NOTE — PROGRESS NOTES
[address] [facility]  Progress Note - Jaquelin Rhodes 1945, 76 y o  female MRN: [id number]  Unit/Bed#: [facility] 317-01 Encounter: [id number]  Primary Care Provider: Shira Hussein MD   Date and time admitted to hospital: 8/30/2022 11:49 PM    Left upper quadrant abdominal pain  Assessment & Plan  Likely secondary to severe constipation, patient with small bowel movements today with significant improvement in pain  KUB suggestive of large volume stool burden, this improved with constipation however patient continues to complain of constant pain    Suspected etiologies include residual pain from C diff colitis though this should be resolved by now versus secondary to irritation of stomach from PEG tube placement, additional consideration of idiopathic/psychological component to symptoms as suggested by certain family members  Continue Miralax, Milk of Mg, suppositories added    Acute encephalopathy  Assessment & Plan  · Resolved  · Secondary to hospital induced delirium vs metabolic encephalopathy  · No focal deficit on exam  · Delirium precautions      Goals of care, counseling/discussion  Assessment & Plan  · Palliative on board  Family meeting held with prior provider:  patient has had a change of heart and wants to be txt focused and proceed with PEG placement  She states that she realized that she is not ready to die  · 9/15:  Laura Hamilton discussion held with patient regarding likely outcomes of either cardiac arrest or respiratory failure requiring intubation given her current clinical state    Patient states she wishes to be DNR DNI with aggressive care      Acute on chronic anemia  Assessment & Plan  · Evaluated by GI prior  · 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  · S/p recent EGD in 7/22 revealing: Residual marginal ulcer of the gastrojejunostomy anastomosis with improved size  · S/p EGD 9/2 revealing: Large, cratered ulcer in the gastrojejunal anastomosis · Received PRBC transfusion x 1 this admission, hemoglobin trending down 7 0 today continue monitoring transfuse less than 7  · Typed and screened for possible transfusion once hgb <7 0  · Cont PPI, BID, carafate    Bacteremia  Assessment & Plan  · Recent hx of staph epi bacteremia in 7/2022, presumed due to picc line  · Now with possible recurrence 1/2 set staph epi, 2nd set no growth thus far  · Had been on IV vancomycin but discontinued due to fluctuating levels  Completed vancomycin to daptomycin courses 9/15  · S/p TAVIA (9/9) no evidence of vegetation  · Repeat blood cx neg x 5 days   · TPN discontinued  S/p PEG placement      Moderate protein-calorie malnutrition ([facility] [address] )  Assessment & Plan  Malnutrition Findings:   Adult Malnutrition type: Chronic illness  Adult Degree of Malnutrition: Other severe protein calorie malnutrition  Malnutrition Characteristics: Weight loss, Muscle loss     On Jevity 1 2 - resume at lower max rate   Checked CMP and it shows hyponatremia of 130, low albumin but mildly elevated AST and A phos    360 Statement: Severe/Chronic malnutrition r/t condition, as evidenced by 4 8% wt loss x < 1 week (9/2/22: 125#, 9/5/22: 119#), and severe muscle mass depletion (temples/clavicle)  Treated with liberalized diet and nutrition supplements, possibly nutrition support pending goals of care    BMI Findings: Body mass index is 18 62 kg/m²  Hx of Savage-en Y gastric bypass, 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  Now off due to recurrent bacteremia  S/p PEG placement and on PO feeds  Cont nutritional supplements    Ambulatory dysfunction  Assessment & Plan  · Exacerbated by diarrhea/dehydration however patient is chronically malnourished  · S/p PT/OT evaluation; to return to SNF  · Family/PT wishing alternate SNF   CM on board to find replacement    NOAH (acute kidney injury) ([facility] )  Assessment & Plan  Cr renal function remains impaired but stable  Suspect volume depletion as main etiology, support with gentle hydration if needed, patient p o  Intake improved  Avoid all possible nephrotoxins  Monitor    H/O bariatric surgery - bypass  Assessment & Plan  · Patient with history of gastric bypass surgery  Has had significant malnutrition issues and anastomotic ulcerations  Seen by Bariatric Service during recent hospitalization at [facility] who recommended prolonged course of TPN for nutritional optimization and possible revision of surgery in the future  · Was planned to have appt with bariatric sx on 8/20 however had to be rescheduled  · Given recurrent bacteremia; s/p GI consult; recommended discontinuation of TPN  S/p PEG, TF initiated  · P o  Diet for pleasure feeding resumed  · To f/u with bariatric surgery      * Sepsis ([facility] [address] )  Assessment & Plan  · Resolved  · WBC Of 12 5 after PEG feed was started but no fever, chills, tachycardia or tachypnea  · Will obtain ESR  CRP and procalcitonin to help tailor need for antibacterial indication   · Initial source was bacteremia, C diff colitis  ID appreciated  · + evidence of colitis on ct scan  Status post C diff panel; positive toxin PCR but negative EIA  On p o  Vancomycin through 9/18  Diarrhea resolved  · Positive recurrent bacteremia  Presumed secondary to PICC line with use of TPN  Prior history of recent MSSE  · Repeat blood cx neg after 5 days  · Discontinued presenting PICC  New PICC ordered (9/9) which will also be dc'ed after abx is completed  · Continue IV daptomycin through 9/15, PICC disconrtinued  · Ur cx polymicrobial but non significant colony ct  Treatment not indicated  · Cont to trend wbc            VTE Pharmacologic Prophylaxis: VTE Score: 3 Moderate Risk (Score 3-4) - Pharmacological DVT Prophylaxis Ordered: heparin  Patient Centered Rounds: I performed bedside rounds with nursing staff today    Discussions with Specialists or Other Care Team Provider: marilia    Education and Discussions with Family / Patient: Updated  (brother) at bedside  Time Spent for Care: 45 minutes  More than 50% of total time spent on counseling and coordination of care as described above  Current Length of Stay: 17 day(s)  Current Patient Status: Inpatient   Certification Statement: The patient will continue to require additional inpatient hospital stay due to Pain control, renal function monitoring, placement  Discharge Plan: Anticipate discharge in 48-72 hrs to rehab facility  Code Status: Level 3 - DNAR and DNI    Subjective:   Patient seen and examined at bedside  No acute events overnight  Patient continues complaining of unrelenting abdominal pain but states she otherwise feels well  Tolerating p o  Feeding and tube feeds  Objective:     Vitals:   Temp (24hrs), Av 7 °F (36 5 °C), Min:97 2 °F (36 2 °C), Max:98 °F (36 7 °C)    Temp:  [97 2 °F (36 2 °C)-98 °F (36 7 °C)] 98 °F (36 7 °C)  HR:  [] 100  Resp:  [16] 16  BP: ()/(43-57) 107/57  SpO2:  [88 %] 88 %  Body mass index is 18 62 kg/m²  Input and Output Summary (last 24 hours): Intake/Output Summary (Last 24 hours) at 2022 1731  Last data filed at 2022 1501  Gross per 24 hour   Intake 365 ml   Output 800 ml   Net -435 ml       Physical Exam:   Physical Exam  Constitutional:       Comments: Elderly female, chronically ill, cachectic   Cardiovascular:      Rate and Rhythm: Normal rate and regular rhythm  Pulses: Normal pulses  Heart sounds: Normal heart sounds  Pulmonary:      Effort: Pulmonary effort is normal  No respiratory distress  Breath sounds: Normal breath sounds  No wheezing or rales  Abdominal:      General: There is no distension  Palpations: Abdomen is soft  Comments: left-sided PEG tube in place without surrounding erythema or discharge, improved tenderness no rebound or guarding   Musculoskeletal:         General: Normal range of motion  Right lower leg: No edema  Left lower leg: No edema     Skin: General: Skin is warm and dry  Neurological:      General: No focal deficit present  Mental Status: She is alert and oriented to person, place, and time  Cranial Nerves: No cranial nerve deficit  Motor: No weakness  Additional Data:     Labs:  Results from last 7 days   Lab Units 09/17/22  0559   WBC Thousand/uL 8 76   HEMOGLOBIN g/dL 7 0*   HEMATOCRIT % 22 9*   PLATELETS Thousands/uL 446*   NEUTROS PCT % 53   LYMPHS PCT % 23   MONOS PCT % 10   EOS PCT % 13*     Results from last 7 days   Lab Units 09/17/22  0559 09/15/22  0556 09/14/22  0857   SODIUM mmol/L 133*   < > 133*   POTASSIUM mmol/L 4 6   < > 4 7   CHLORIDE mmol/L 102   < > 102   CO2 mmol/L 28   < > 27   BUN mg/dL 21   < > 22   CREATININE mg/dL 1 39*   < > 1 52*   ANION GAP mmol/L 3*   < > 4   CALCIUM mg/dL 8 2*   < > 7 9*   ALBUMIN g/dL  --   --  1 6*   TOTAL BILIRUBIN mg/dL  --   --  0 29   ALK PHOS U/L  --   --  176*   ALT U/L  --   --  37   AST U/L  --   --  51*   GLUCOSE RANDOM mg/dL 110   < > 128    < > = values in this interval not displayed  Results from last 7 days   Lab Units 09/17/22  1650 09/16/22  1756 09/16/22  0626 09/16/22  0041 09/15/22  1809 09/15/22  0604 09/15/22  0033 09/14/22  1613 09/14/22  1229 09/14/22  0622 09/13/22  1807 09/13/22  0606   POC GLUCOSE mg/dl 114 133 106 103 93 106 98 108 120 104 113 128         Results from last 7 days   Lab Units 09/13/22  0539 09/12/22  1525   PROCALCITONIN ng/ml 0 27* 0 27*       Lines/Drains:  Invasive Devices  Report    Peripheral Intravenous Line  Duration           Peripheral IV 09/16/22 Left;Proximal;Ventral (anterior) Forearm <1 day          Drain  Duration           External Urinary Catheter 42 days    Rectal Tube With balloon 17 days    Gastrostomy/Enterostomy Percutaneous Endoscopic Gastrostomy (PEG) 20 Fr  LUQ 9 days                      Imaging: No pertinent imaging reviewed      Recent Cultures (last 7 days):         Last 24 Hours Medication List: Current Facility-Administered Medications   Medication Dose Route Frequency Provider Last Rate   • acetaminophen  650 mg Oral Q6H PRN Rena Glatter, DO     • ALPRAZolam  0 25 mg Oral TID PRN Ilda Boudreaux, DO     • alteplase  2 mg Intracatheter Once [employer], PA-C     • bisacodyl  10 mg Rectal Daily PRN Lidia Campbell MD     • clonazePAM  3 mg Oral HS Jovita Mcdermott, DO     • folic acid  1 mg Oral Daily Jovita Mcdermott, DO     • heparin (porcine)  5,000 Units Subcutaneous [facility] Boudre[facility], DO     • levothyroxine  112 mcg Oral Early Morning Jovita Mcdermott, DO     • magnesium hydroxide  30 mL Oral Daily PRN Lidia Campbell MD     • midodrine  5 mg Oral TID AC Jovitakash Mcdermott, DO     • ondansetron  4 mg Intravenous Q6H PRN Rena Glatter, DO     • oxyCODONE  5 mg Oral Q6H Lidia Campbell MD     • pantoprazole  40 mg Oral BID AC Kaylanbruce Arroyo, DO     • polyethylene glycol  17 g Oral Daily PRN Lidia Campbell MD     • sucralfate  1 g Oral 4x Daily (AC & HS) Ilda Boudreaux, DO     • thiamine  100 mg Oral Daily Jovita Baldemar, DO     • traZODone  150 mg Oral HS Jovita Mcdermott, DO     • vancomycin  125 mg Oral Q6H [address] Erik Donald MD          Today, Patient Was Seen By: Lidia Campbell MD    **Please Note: This note may have been constructed using a voice recognition system  **

## 2022-09-17 NOTE — ASSESSMENT & PLAN NOTE
· Patient with history of gastric bypass surgery  Has had significant malnutrition issues and anastomotic ulcerations  Seen by Bariatric Service during recent hospitalization at [facility] who recommended prolonged course of TPN for nutritional optimization and possible revision of surgery in the future  · Was planned to have appt with bariatric sx on 8/20 however had to be rescheduled  · Given recurrent bacteremia; s/p GI consult; recommended discontinuation of TPN  S/p PEG, TF initiated  · P o   Diet for pleasure feeding resumed  · To f/u with bariatric surgery

## 2022-09-17 NOTE — PLAN OF CARE
Problem: Potential for Falls  Goal: Patient will remain free of falls  Description: INTERVENTIONS:  - Educate patient/family on patient safety including physical limitations  - Instruct patient to call for assistance with activity   - Consult OT/PT to assist with strengthening/mobility   - Keep Call bell within reach  - Keep bed low and locked with side rails adjusted as appropriate  - Keep care items and personal belongings within reach  - Initiate and maintain comfort rounds  - Make Fall Risk Sign visible to staff  - Offer Toileting everyHours, in advance of need  - Initiate/Maintain arm  - Obtain necessary fall risk management equipmen  - Apply yellow socks and bracelet for high fall risk patients  - Consider moving patient to room near nurses station  Outcome: Progressing     Problem: MOBILITY - ADULT  Goal: Maintain or return to baseline ADL function  Description: INTERVENTIONS:  -  Assess patient's ability to carry out ADLs; assess patient's baseline for ADL function and identify physical deficits which impact ability to perform ADLs (bathing, care of mouth/teeth, toileting, grooming, dressing, etc )  - Assess/evaluate cause of self-care deficits   - Assess range of motion  - Assess patient's mobility; develop plan if impaired  - Assess patient's need for assistive devices and provide as appropriate  - Encourage maximum independence but intervene and supervise when necessary  - Involve family in performance of ADLs  - Assess for home care needs following discharge   - Consider OT consult to assist with ADL evaluation and planning for discharge  - Provide patient education as appropriate  Outcome: Progressing  Goal: Maintains/Returns to pre admission functional level  Description: INTERVENTIONS:  - Perform BMAT or MOVE assessment daily    - Set and communicate daily mobility goal to care team and patient/family/caregiver     - Collaborate with rehabilitation services on mobility goals if consulted  - Perform Range of Mo  - Out of bed for toileting  - Record patient progress and toleration of activity level   Outcome: Progressing     Problem: INFECTION - ADULT  Goal: Absence or prevention of progression during hospitalization  Description: INTERVENTIONS:  - Assess and monitor for signs and symptoms of infection  - Monitor lab/diagnostic results  - Monitor all insertion sites, i e  indwelling lines, tubes, and drains  - Monitor endotracheal if appropriate and nasal secretions for changes in amount and color  - Basye appropriate cooling/warming therapies per order  - Administer medications as ordered  - Instruct and encourage patient and family to use good hand hygiene technique  - Identify and instruct in appropriate isolation precautions for identified infection/condition  Outcome: Progressing  Goal: Absence of fever/infection during neutropenic period  Description: INTERVENTIONS:  - Monitor WBC    Outcome: Progressing     Problem: SAFETY ADULT  Goal: Patient will remain free of falls  Description: INTERVENTIONS:  - Educate patient/family on patient safety including physical limitations  - Instruct patient to call for assistance with activity   - Consult OT/PT to assist with strengthening/mobility   - Keep Call bell within reach  - Keep bed low and locked with side rails adjusted as appropriate  - Keep care items and personal belongings within reach  - Initiate and maintain comfort rounds  - Make Fall Risk Sign visible to staff  - Offer Toileting every Hours, in advance of need  - Initiate/Maintain arm  - Obtain necessary fall risk management equipment:  - Apply yellow socks and bracelet for high fall risk patients  - Consider moving patient to room near nurses station  Outcome: Progressing  Goal: Maintain or return to baseline ADL function  Description: INTERVENTIONS:  -  Assess patient's ability to carry out ADLs; assess patient's baseline for ADL function and identify physical deficits which impact ability to perform ADLs (bathing, care of mouth/teeth, toileting, grooming, dressing, etc )  - Assess/evaluate cause of self-care deficits   - Assess range of motion  - Assess patient's mobility; develop plan if impaired  - Assess patient's need for assistive devices and provide as appropriate  - Encourage maximum independence but intervene and supervise when necessary  - Involve family in performance of ADLs  - Assess for home care needs following discharge   - Consider OT consult to assist with ADL evaluation and planning for discharge  - Provide patient education as appropriate  Outcome: Progressing  Goal: Maintains/Returns to pre admission functional level  Description: INTERVENTIONS:  - Perform BMAT or MOVE assessment daily    - Set and communicate daily mobility goal to care team and patient/family/caregiver     - Collaborate with rehabilitation services on mobility goals if consulted  - Perform Range of Mo  - Out of bed for toileting  - Record patient progress and toleration of activity level   Outcome: Progressing     Problem: DISCHARGE PLANNING  Goal: Discharge to home or other facility with appropriate resources  Description: INTERVENTIONS:  - Identify barriers to discharge w/patient and caregiver  - Arrange for needed discharge resources and transportation as appropriate  - Identify discharge learning needs (meds, wound care, etc )  - Arrange for interpretive services to assist at discharge as needed  - Refer to Case Management Department for coordinating discharge planning if the patient needs post-hospital services based on physician/advanced practitioner order or complex needs related to functional status, cognitive ability, or social support system  Outcome: Progressing     Problem: Knowledge Deficit  Goal: Patient/family/caregiver demonstrates understanding of disease process, treatment plan, medications, and discharge instructions  Description: Complete learning assessment and assess knowledge base   Interventions:  - Provide teaching at level of understanding  - Provide teaching via preferred learning methods  Outcome: Progressing     Problem: SKIN/TISSUE INTEGRITY - ADULT  Goal: Skin Integrity remains intact(Skin Breakdown Prevention)  Description: Assess:  -Perform Gabe ass  -Assess extremities for adequate circulation and sensation     Bed Management:  -Have minimal linens on bed & keep smooth, unwrinkled  -Change linens as needed when moist or perspiring  -Avoid sitting or lying in one position for more than hours while in bed  -Keep HOB ategrees     Toileting:  -Offer bedside commode  -Assess for incontinence every  -Use incontinent care products after each incontinent episode such as    Activity:  -Mobilize patient  times a day  -Encourage activity and walks on unit  -Encourage or provide ROM exercises   -Turn and reposition patient every Hours  -Use appropriate equipment to lift or move patient in bed  -Instruct/ Assist with weight shifting everywhen out of bed in chair  -Consider limitation of chair timehour intervals    Skin Care:  -Avoid use of baby powder, tape, friction and shearing, hot water or constrictive clothing  -Relieve pressure over bony prominences using  -Do not massage red bony areas    Next Steps:  -Teach patient strategies to minimize risks such as   -Consider consults to  interdisciplinary teams such as  Outcome: Progressing  Goal: Incision(s), wounds(s) or drain site(s) healing without S/S of infection  Description: INTERVENTIONS  - Assess and document dressing, incision, wound bed, drain sites and surrounding tissue  - Provide patient and family education  - Perform skin care/dressing changes every  Outcome: Progressing  Goal: Pressure injury heals and does not worsen  Description: Interventions:  - Implement low air loss mattress or specialty surface (Criteria met)  - Apply silicone foam dressing  - Instruct/assist with weight shifting every minutes when in chair   - Limit chair time to  hour intervals  - Use special pressure reducing interventions such as when in chair   - Apply fecal or urinary incontinence containment device   - Perform passive or active ROM every   - Turn and reposition patient & offload bony prominences every hours   - Utilize friction reducing device or surface for transfers   - Consider consults to  interdisciplinary teams such as  - Use incontinent care products after each incontinent episode such as  - Consider nutrition services referral as needed  Outcome: Progressing     Problem: Prexisting or High Potential for Compromised Skin Integrity  Goal: Skin integrity is maintained or improved  Description: INTERVENTIONS:  - Identify patients at risk for skin breakdown  - Assess and monitor skin integrity  - Assess and monitor nutrition and hydration status  - Monitor labs   - Assess for incontinence   - Turn and reposition patient  - Assist with mobility/ambulation  - Relieve pressure over bony prominences  - Avoid friction and shearing  - Provide appropriate hygiene as needed including keeping skin clean and dry  - Evaluate need for skin moisturizer/barrier cream  - Collaborate with interdisciplinary team   - Patient/family teaching  - Consider wound care consult   Outcome: Progressing     Problem: Nutrition/Hydration-ADULT  Goal: Nutrient/Hydration intake appropriate for improving, restoring or maintaining nutritional needs  Description: Monitor and assess patient's nutrition/hydration status for malnutrition  Collaborate with interdisciplinary team and initiate plan and interventions as ordered  Monitor patient's weight and dietary intake as ordered or per policy  Utilize nutrition screening tool and intervene as necessary  Determine patient's food preferences and provide high-protein, high-caloric foods as appropriate       INTERVENTIONS:  - Monitor oral intake, urinary output, labs, and treatment plans  - Assess nutrition and hydration status and recommend course of action  - Evaluate amount of meals eaten  - Assist patient with eating if necessary   - Allow adequate time for meals  - Recommend/ encourage appropriate diets, oral nutritional supplements, and vitamin/mineral supplements  - Order, calculate, and assess calorie counts as needed  - Recommend, monitor, and adjust tube feedings and TPN/PPN based on assessed needs  - Assess need for intravenous fluids  - Provide specific nutrition/hydration education as appropriate  - Include patient/family/caregiver in decisions related to nutrition  Outcome: Progressing

## 2022-09-17 NOTE — ASSESSMENT & PLAN NOTE
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

## 2022-09-18 NOTE — ASSESSMENT & PLAN NOTE
Noted downtrending serum sodium 133-130 today suspect hyperosmolar in setting malnutrition  Will attempt to discuss with inpatient Nutrition Service regarding potential adjustments to tube feedings  Resume gentle IV saline hydration to support  A kaylie ALFREDO

## 2022-09-18 NOTE — ASSESSMENT & PLAN NOTE
· Patient with history of gastric bypass surgery  Has had significant malnutrition issues and anastomotic ulcerations  Seen by Bariatric Service during recent hospitalization at [facility] who recommended prolonged course of TPN for nutritional optimization and possible revision of surgery in the future  · Was planned to have appt with bariatric sx on 8/20 however had to be rescheduled  · Given recurrent bacteremia; s/p GI consult; recommended discontinuation of TPN  S/p PEG, TF initiated  · P o   Diet for pleasure feeding resumed  · To f/u with bariatric surgery

## 2022-09-18 NOTE — ASSESSMENT & PLAN NOTE
· Palliative on board  Family meeting held with prior provider:  patient has had a change of heart and wants to be txt focused and proceed with PEG placement  She states that she realized that she is not ready to die  · 9/15:  Allie Blakeyl discussion held with patient regarding likely outcomes of either cardiac arrest or respiratory failure requiring intubation given her current clinical state    Patient states she wishes to be DNR DNI with aggressive care

## 2022-09-18 NOTE — PROGRESS NOTES
[address] [facility]  Progress Note - Cameron Smith 1945, 76 y o  female MRN: [id number]  Unit/Bed#: [facility] 317-01 Encounter: [id number]  Primary Care Provider: Refugio Almanza MD   Date and time admitted to hospital: 8/30/2022 11:49 PM    Left upper quadrant abdominal pain  Assessment & Plan  Likely secondary to severe constipation, patient with small bowel movements today with significant improvement in pain  KUB suggestive of large volume stool burden, this improved with constipation however patient continues to complain of constant pain    Suspected etiologies include residual pain from C diff colitis though this should be resolved by now versus secondary to irritation of stomach from PEG tube placement    Continue Miralax, Milk of Mg, suppositories added    Acute encephalopathy  Assessment & Plan  · Resolved  · Secondary to hospital induced delirium vs metabolic encephalopathy  · No focal deficit on exam  · Delirium precautions      Goals of care, counseling/discussion  Assessment & Plan  · Palliative on board  Family meeting held with prior provider:  patient has had a change of heart and wants to be txt focused and proceed with PEG placement  She states that she realized that she is not ready to die  · 9/15:  Sagar Leon discussion held with patient regarding likely outcomes of either cardiac arrest or respiratory failure requiring intubation given her current clinical state    Patient states she wishes to be DNR DNI with aggressive care      Acute on chronic anemia  Assessment & Plan  · Evaluated by GI prior  · 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  · S/p recent EGD in 7/22 revealing: Residual marginal ulcer of the gastrojejunostomy anastomosis with improved size  · S/p EGD 9/2 revealing: Large, cratered ulcer in the gastrojejunal anastomosis   · Received PRBC transfusion x 1 this admission, hemoglobin trending down 7 0 today continue monitoring transfuse less than 7  · Typed and screened for possible transfusion once hgb <7 0  · Cont PPI, BID, carafate    Bacteremia  Assessment & Plan  · Recent hx of staph epi bacteremia in 7/2022, presumed due to picc line  · Now with possible recurrence 1/2 set staph epi, 2nd set no growth thus far  · Had been on IV vancomycin but discontinued due to fluctuating levels  Completed vancomycin to daptomycin courses 9/15  · S/p TAVIA (9/9) no evidence of vegetation  · Repeat blood cx neg x 5 days   · TPN discontinued  S/p PEG placement      Moderate protein-calorie malnutrition ([facility] [address] )  Assessment & Plan  Malnutrition Findings:   Adult Malnutrition type: Chronic illness  Adult Degree of Malnutrition: Other severe protein calorie malnutrition  Malnutrition Characteristics: Weight loss, Muscle loss     On Jevity 1 2 - resume at lower max rate   Checked CMP and it shows hyponatremia of 130, low albumin but mildly elevated AST and A phos    360 Statement: Severe/Chronic malnutrition r/t condition, as evidenced by 4 8% wt loss x < 1 week (9/2/22: 125#, 9/5/22: 119#), and severe muscle mass depletion (temples/clavicle)  Treated with liberalized diet and nutrition supplements, possibly nutrition support pending goals of care    BMI Findings: Body mass index is 18 62 kg/m²  Hx of Savage-en Y gastric bypass, 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  Now off due to recurrent bacteremia  S/p PEG placement and on PO feeds  Cont nutritional supplements    Ambulatory dysfunction  Assessment & Plan  · Exacerbated by diarrhea/dehydration however patient is chronically malnourished  · S/p PT/OT evaluation; to return to SNF  · Family/PT wishing alternate SNF  CM on board to find replacement    NOAH (acute kidney injury) ([facility] )  Assessment & Plan  Cr renal function remains impaired but stable  Suspect volume depletion as main etiology, support with gentle hydration if needed, patient p o  Intake improved  Avoid all possible nephrotoxins   Monitor    H/O bariatric surgery - bypass  Assessment & Plan  · Patient with history of gastric bypass surgery  Has had significant malnutrition issues and anastomotic ulcerations  Seen by Bariatric Service during recent hospitalization at [facility] who recommended prolonged course of TPN for nutritional optimization and possible revision of surgery in the future  · Was planned to have appt with bariatric sx on 8/20 however had to be rescheduled  · Given recurrent bacteremia; s/p GI consult; recommended discontinuation of TPN  S/p PEG, TF initiated  · P o  Diet for pleasure feeding resumed  · To f/u with bariatric surgery      Hyponatremia  Assessment & Plan  Noted downtrending serum sodium 133-130 today suspect hyperosmolar in setting malnutrition  Will attempt to discuss with inpatient Nutrition Service regarding potential adjustments to tube feedings  Resume gentle IV saline hydration to support  A m  BMP    * Sepsis ([facility] [address] )  Assessment & Plan  · Resolved  · WBC Of 12 5 after PEG feed was started but no fever, chills, tachycardia or tachypnea  · Will obtain ESR  CRP and procalcitonin to help tailor need for antibacterial indication   · Initial source was bacteremia, C diff colitis  ID appreciated  · + evidence of colitis on ct scan  Status post C diff panel; positive toxin PCR but negative EIA  On p o  Vancomycin through 9/18  Diarrhea resolved  · Positive recurrent bacteremia  Presumed secondary to PICC line with use of TPN  Prior history of recent MSSE  · Repeat blood cx neg after 5 days  · Discontinued presenting PICC  New PICC ordered (9/9) which will also be dc'ed after abx is completed  · Continue IV daptomycin through 9/15, PICC disconrtinued  · Ur cx polymicrobial but non significant colony ct  Treatment not indicated  · Cont to trend wbc            VTE Pharmacologic Prophylaxis: VTE Score: 3 Moderate Risk (Score 3-4) - Pharmacological DVT Prophylaxis Ordered: heparin      Patient Centered Rounds: I performed bedside rounds with nursing staff today  Discussions with Specialists or Other Care Team Provider: marilia    Education and Discussions with Family / Patient: Patient declined call to   Time Spent for Care: 45 minutes  More than 50% of total time spent on counseling and coordination of care as described above  Current Length of Stay: 18 day(s)  Current Patient Status: Inpatient   Certification Statement: The patient will continue to require additional inpatient hospital stay due to Continue monitoring of serum sodium, renal function, rehab placement  Discharge Plan: Anticipate discharge in 48-72 hrs to rehab facility  Code Status: Level 3 - DNAR and DNI    Subjective:   Patient seen and examined at bedside  No acute events overnight  Complains of nausea which she attributes to outside meal containing shrimp brought in by family yesterday  Abdominal discomfort unchanged denies any chest pain or shortness of breath  Objective:     Vitals:   Temp (24hrs), Av 5 °F (36 9 °C), Min:98 3 °F (36 8 °C), Max:98 8 °F (37 1 °C)    Temp:  [98 3 °F (36 8 °C)-98 8 °F (37 1 °C)] 98 3 °F (36 8 °C)  HR:  [79-87] 79  Resp:  [16-18] 18  BP: (102-109)/(40-56) 102/40  Body mass index is 18 62 kg/m²  Input and Output Summary (last 24 hours): Intake/Output Summary (Last 24 hours) at 2022 1717  Last data filed at 2022 1454  Gross per 24 hour   Intake 760 ml   Output 800 ml   Net -40 ml       Physical Exam:   Physical Exam  Constitutional:       Comments: Elderly female, chronically ill, cachectic   Cardiovascular:      Rate and Rhythm: Normal rate and regular rhythm  Pulses: Normal pulses  Heart sounds: Normal heart sounds  Pulmonary:      Effort: Pulmonary effort is normal  No respiratory distress  Breath sounds: Normal breath sounds  No wheezing or rales  Abdominal:      General: There is no distension  Palpations: Abdomen is soft        Comments: left-sided PEG tube in place without surrounding erythema or discharge, improved tenderness no rebound or guarding   Musculoskeletal:         General: Normal range of motion  Right lower leg: No edema  Left lower leg: No edema  Skin:     General: Skin is warm and dry  Neurological:      General: No focal deficit present  Mental Status: She is alert and oriented to person, place, and time  Cranial Nerves: No cranial nerve deficit  Motor: No weakness  Additional Data:     Labs:  Results from last 7 days   Lab Units 09/18/22  0952   WBC Thousand/uL 10 58*   HEMOGLOBIN g/dL 7 4*   HEMATOCRIT % 24 6*   PLATELETS Thousands/uL 468*   NEUTROS PCT % 56   LYMPHS PCT % 23   MONOS PCT % 9   EOS PCT % 11*     Results from last 7 days   Lab Units 09/18/22  0952 09/15/22  0556 09/14/22  0857   SODIUM mmol/L 130*   < > 133*   POTASSIUM mmol/L 5 0   < > 4 7   CHLORIDE mmol/L 98   < > 102   CO2 mmol/L 28   < > 27   BUN mg/dL 24   < > 22   CREATININE mg/dL 1 47*   < > 1 52*   ANION GAP mmol/L 4   < > 4   CALCIUM mg/dL 8 1*   < > 7 9*   ALBUMIN g/dL  --   --  1 6*   TOTAL BILIRUBIN mg/dL  --   --  0 29   ALK PHOS U/L  --   --  176*   ALT U/L  --   --  37   AST U/L  --   --  51*   GLUCOSE RANDOM mg/dL 97   < > 128    < > = values in this interval not displayed           Results from last 7 days   Lab Units 09/18/22  1615 09/18/22  1042 09/18/22  0627 09/18/22  0055 09/17/22  1650 09/16/22  1756 09/16/22  0626 09/16/22  0041 09/15/22  1809 09/15/22  0604 09/15/22  0033 09/14/22  1613   POC GLUCOSE mg/dl 97 145* 122 122 114 133 106 103 93 106 98 108         Results from last 7 days   Lab Units 09/13/22  0539 09/12/22  1525   PROCALCITONIN ng/ml 0 27* 0 27*       Lines/Drains:  Invasive Devices  Report    Peripheral Intravenous Line  Duration           Peripheral IV 09/16/22 Left;Proximal;Ventral (anterior) Forearm 1 day    Peripheral IV 09/18/22 Proximal;Right;Ventral (anterior) Forearm <1 day          Drain  Duration External Urinary Catheter 43 days    Rectal Tube With balloon 18 days    Gastrostomy/Enterostomy Percutaneous Endoscopic Gastrostomy (PEG) 20 Fr  LUQ 10 days                      Imaging: No pertinent imaging reviewed  Recent Cultures (last 7 days):         Last 24 Hours Medication List:   Current Facility-Administered Medications   Medication Dose Route Frequency Provider Last Rate   • acetaminophen  650 mg Oral Q6H PRN Jovita Mcdermott, DO     • ALPRAZolam  0 25 mg Oral TID PRN Blaise Jiménez, DO     • alteplase  2 mg Intracatheter Once Kiki Matt PA-C     • bisacodyl  10 mg Rectal Daily PRN Norberto Gunn MD     • clonazePAM  3 mg Oral HS Jovita Mcdermott, DO     • folic acid  1 mg Oral Daily Jovita Mcdermott, DO     • heparin (porcine)  5,000 Units Subcutaneous [facility], [facility]     • levothyroxine  112 mcg Oral Early Morning Jovita Mcdermott, DO     • magnesium hydroxide  30 mL Oral Daily PRN Norberto Gunn MD     • metoclopramide  10 mg Intravenous Q6H PRN Norberto Gunn MD     • midodrine  5 mg Oral TID AC Jovitakash Valdesel, DO     • ondansetron  4 mg Intravenous Q6H PRN Renetta Gonzalez, DO     • oxyCODONE  5 mg Oral Q6H Norberto Gunn MD     • pantoprazole  40 mg Oral BID AC Kaylanbruce Arroyo, DO     • polyethylene glycol  17 g Oral Daily PRN Norberto Gunn MD     • sodium chloride  50 mL/hr Intravenous Continuous Norberto Gunn MD 50 mL/hr (09/18/22 1339)   • sucralfate  1 g Oral 4x Daily (AC & HS) Blaise Jiménez, DO     • thiamine  100 mg Oral Daily Jovita Mcdermott, DO     • traZODone  150 mg Oral HS Jovita Mcdermott, DO     • vancomycin  125 mg Oral Q6H [facility] Barney Kelly MD          Today, Patient Was Seen By: Norberto Gunn MD    **Please Note: This note may have been constructed using a voice recognition system  **

## 2022-09-19 PROBLEM — R74.01 TRANSAMINITIS: Status: ACTIVE | Noted: 2022-09-19

## 2022-09-19 NOTE — ASSESSMENT & PLAN NOTE
Likely secondary to severe constipation, patient with small bowel movements today with significant improvement in pain  KUB suggestive of large volume stool burden, this improved with constipation however patient continues to complain of constant pain    Suspected etiologies include residual pain from C diff colitis though this should be resolved by now versus secondary to irritation of stomach from PEG tube placement    Continue with bowel regimen

## 2022-09-19 NOTE — ASSESSMENT & PLAN NOTE
· Palliative on board  Family meeting held with prior provider:  patient has had a change of heart and wants to be txt focused and proceed with PEG placement  She states that she realized that she is not ready to die  · 9/15:  Fabby Alcantar discussion held with patient regarding likely outcomes of either cardiac arrest or respiratory failure requiring intubation given her current clinical state    Patient states she wishes to be DNR DNI with aggressive care

## 2022-09-19 NOTE — ASSESSMENT & PLAN NOTE
· Resolved  · WBC Of 12 5 after PEG feed was started but no fever, chills, tachycardia or tachypnea   · Initial source was bacteremia, C diff colitis  ID appreciated  · + evidence of colitis on ct scan  Status post C diff panel; positive toxin PCR but negative EIA  On p o  Vancomycin through 9/18  Diarrhea resolved  · Positive recurrent bacteremia  Presumed secondary to PICC line with use of TPN  Prior history of recent MSSE  · Repeat blood cx neg after 5 days  · Discontinued presenting PICC  New PICC ordered (9/9) which will also be dc'ed after abx is completed  · Continue IV daptomycin through 9/15, PICC disconrtinued after IV daptomycin  · Ur cx polymicrobial but non significant colony ct   Treatment not indicated  · Cont to trend wbc

## 2022-09-19 NOTE — ASSESSMENT & PLAN NOTE
· Patient with history of gastric bypass surgery  Has had significant malnutrition issues and anastomotic ulcerations  Seen by Bariatric Service during recent hospitalization at [facility] who recommended prolonged course of TPN for nutritional optimization and possible revision of surgery in the future  · Was planned to have appt with bariatric sx on 8/20 however had to be rescheduled  · Given recurrent bacteremia; s/p GI consult; recommended discontinuation of TPN  S/p PEG, TF initiated  · P o   Diet for pleasure feeding resumed  · To f/u with bariatric surgery

## 2022-09-19 NOTE — CASE MANAGEMENT
Case Management Discharge Planning Note    Patient name Twila Ramirez  Location [facility]30-40 MRN [id number]  : 1945 Date 2022       Current Admission Date: 2022  Current Admission Diagnosis:Sepsis [facility])   Patient Active Problem List    Diagnosis Date Noted   • Left upper quadrant abdominal pain 2022   • Acute encephalopathy 2022   • Moderate protein-calorie malnutrition ([address] ) 2022   • Bacteremia 2022   • Acute on chronic anemia 2022   • Goals of care, counseling/discussion 2022   • Colitis presumed to be due to infection 2022   • Acute cystitis without hematuria 2022   • Acute kidney insufficiency 2022   • Fall 2022   • Diarrhea 2022   • Sepsis ([address] ) 2022   • Sacral ulcer ([facility] [address] ) 2022   • Gram-positive bacteremia 2022   • Severe protein-calorie malnutrition ([address] ) 2022   • Bilateral leg edema 2022   • Ambulatory dysfunction 2022   • Acute blood loss anemia 2022   • NOAH (acute kidney injury) ([facility] [address] ) 2022   • Gastric ulcer 2022   • Fever 2022   • Anemia 2022   • Dyspnea on exertion 2022   • Generalized weakness 2022   • Elevated LFTs 2022   • Hyponatremia 2022   • Abnormal CT scan 2022   • H/O bariatric surgery - bypass 2022   • Cerumen debris on tympanic membrane of right ear 2022   • Nasal congestion 2022   • Bilateral hearing loss 2022   • Fibromyalgia syndrome 2021   • Osteopenia of both forearms 2021   • Medicare annual wellness visit, subsequent 2021   • Shortness of breath 2021   • Acute bronchitis 2021   • COVID-19 2021   • Dysphasia 2020   • Epigastric pain 2020   • Hypothyroidism 2020   • Gastroesophageal reflux disease without esophagitis 2020   • Depression 2020   • Fibromyalgia, primary 2020   • Iron deficiency 2020 • Numbness of foot 11/13/2020      LOS (days): 19  Geometric Mean LOS (GMLOS) (days): 4 80  Days to GMLOS:-14 5     OBJECTIVE:  Risk of Unplanned Readmission Score: 34 22         Current admission status: Inpatient   Preferred Pharmacy:   [facility] [address] [facility], [address], [place] [address] [address] [facility] [address]  Phone: [phone] Fax: [phone]    [address] [place] [id number]  Phone: [phone] Fax: [phone]    Primary Care Provider: Alice Alvarado MD    Primary Insurance: [facility]  Secondary Insurance:     DISCHARGE DETAILS:    Insurance [employer] was submitted for pt to return to [facility] 2021  CM liaison informed CM via email "Denial for SNF, p2p # [phone] Opt 5, must be called by 2:00pm EST = 3:00pm EST"  Per MD pt is not medically stable for DC  Per Niecy H at [facility] a new insurance [employer] will need to be started once pt is stable for DC

## 2022-09-19 NOTE — PROGRESS NOTES
[facility] [facility]  Progress Note - Jaquelin Rhodes 1945, 76 y o  female MRN: [id number]  Unit/Bed#: [facility] 317-01 Encounter: [id number]  Primary Care Provider: Shira Hussein MD   Date and time admitted to hospital: 8/30/2022 11:49 PM    * Sepsis ([address] )  Assessment & Plan  · Resolved  · WBC Of 12 5 after PEG feed was started but no fever, chills, tachycardia or tachypnea   · Initial source was bacteremia, C diff colitis  ID appreciated  · + evidence of colitis on ct scan  Status post C diff panel; positive toxin PCR but negative EIA  On p o  Vancomycin through 9/18  Diarrhea resolved  · Positive recurrent bacteremia  Presumed secondary to PICC line with use of TPN  Prior history of recent MSSE  · Repeat blood cx neg after 5 days  · Discontinued presenting PICC  New PICC ordered (9/9) which will also be dc'ed after abx is completed  · Continue IV daptomycin through 9/15, PICC disconrtinued after IV daptomycin  · Ur cx polymicrobial but non significant colony ct   Treatment not indicated  · Cont to trend wbc      Transaminitis  Assessment & Plan  · Noted to have trending transaminitis  · Will obtain ultrasound of the liver  · Trend LFTs    Left upper quadrant abdominal pain  Assessment & Plan  Likely secondary to severe constipation, patient with small bowel movements today with significant improvement in pain  KUB suggestive of large volume stool burden, this improved with constipation however patient continues to complain of constant pain    Suspected etiologies include residual pain from C diff colitis though this should be resolved by now versus secondary to irritation of stomach from PEG tube placement    Continue with bowel regimen    Acute encephalopathy  Assessment & Plan  · Resolved  · Secondary to hospital induced delirium vs metabolic encephalopathy  · No focal deficit on exam  · Delirium precautions      Goals of care, counseling/discussion  Assessment & Plan  · Palliative on board  Family meeting held with prior provider:  patient has had a change of heart and wants to be txt focused and proceed with PEG placement  She states that she realized that she is not ready to die  · 9/15:  [place] discussion held with patient regarding likely outcomes of either cardiac arrest or respiratory failure requiring intubation given her current clinical state  Patient states she wishes to be DNR DNI with aggressive care      Acute on chronic anemia  Assessment & Plan  · Evaluated by GI prior  · 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  · S/p recent EGD in 7/22 revealing: Residual marginal ulcer of the gastrojejunostomy anastomosis with improved size  · S/p EGD 9/2 revealing: Large, cratered ulcer in the gastrojejunal anastomosis   · Received PRBC transfusion x 1 this admission, hemoglobin trending down 7 0 today continue monitoring transfuse less than 7  · Typed and screened for possible transfusion once hgb <7 0  · Cont PPI, BID, carafate    Bacteremia  Assessment & Plan  · Recent hx of staph epi bacteremia in 7/2022, presumed due to picc line  · Now with possible recurrence 1/2 set staph epi, 2nd set no growth thus far  · Had been on IV vancomycin but discontinued due to fluctuating levels  Completed vancomycin to daptomycin courses 9/15  · S/p TAVIA (9/9) no evidence of vegetation  · Repeat blood cx neg x 5 days   · TPN discontinued   S/p PEG placement  · PICC line discontinued      Moderate protein-calorie malnutrition (HCC)  Assessment & Plan  Malnutrition Findings:   Adult Malnutrition type: Chronic illness  Adult Degree of Malnutrition: Other severe protein calorie malnutrition  Malnutrition Characteristics: Weight loss, Muscle loss     On Jevity 1 2 - resume at lower max rate   Checked CMP and it shows hyponatremia of 130, low albumin but mildly elevated AST and A phos    360 Statement: Severe/Chronic malnutrition r/t condition, as evidenced by 4 8% wt loss x < 1 week (9/2/22: 125#, 9/5/22: 119#), and severe muscle mass depletion (temples/clavicle)  Treated with liberalized diet and nutrition supplements, possibly nutrition support pending goals of care    BMI Findings: Body mass index is 18 62 kg/m²  Hx of Savage-en Y gastric bypass, 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  Now off due to recurrent bacteremia  S/p PEG placement and on PO feeds  Cont nutritional supplements    Ambulatory dysfunction  Assessment & Plan  · Exacerbated by diarrhea/dehydration however patient is chronically malnourished  · S/p PT/OT evaluation; to return to SNF  · Family/PT wishing alternate SNF  CM on board to find replacement    NOAH (acute kidney injury) ([facility] [address] )  Assessment & Plan  Cr renal function remains impaired but stable  Suspect volume depletion as main etiology, support with gentle hydration if needed, patient p o  Intake improved  Avoid all possible nephrotoxins  Monitor    H/O bariatric surgery - bypass  Assessment & Plan  · Patient with history of gastric bypass surgery  Has had significant malnutrition issues and anastomotic ulcerations  Seen by Bariatric Service during recent hospitalization at [facility] who recommended prolonged course of TPN for nutritional optimization and possible revision of surgery in the future  · Was planned to have appt with bariatric sx on 8/20 however had to be rescheduled  · Given recurrent bacteremia; s/p GI consult; recommended discontinuation of TPN  S/p PEG, TF initiated  · P o  Diet for pleasure feeding resumed  · To f/u with bariatric surgery      Hyponatremia  Assessment & Plan  Serum sodium is 131 today  Status post IV fluids  A m  BMP      VTE Pharmacologic Prophylaxis: VTE Score: 3 Moderate Risk (Score 3-4) - Pharmacological DVT Prophylaxis Ordered: heparin  Patient Centered Rounds: I performed bedside rounds with nursing staff today    Discussions with Specialists or Other Care Team Provider:     Education and Discussions with Family / Patient: Updated  (sister) via phone  Time Spent for Care: 30 minutes  More than 50% of total time spent on counseling and coordination of care as described above  Current Length of Stay: 19 day(s)  Current Patient Status: Inpatient   Certification Statement: The patient will continue to require additional inpatient hospital stay due to lft   Discharge Plan:   Code Status: Level 3 - DNAR and DNI    Subjective:   Patient seen and examined  Complaining of left-sided abdominal pain    Discussed with nursing-    Objective:     Vitals:   Temp (24hrs), Av 2 °F (36 8 °C), Min:97 6 °F (36 4 °C), Max:98 8 °F (37 1 °C)    Temp:  [97 6 °F (36 4 °C)-98 8 °F (37 1 °C)] 98 2 °F (36 8 °C)  HR:  [85-95] 85  Resp:  [15-18] 15  BP: (104-111)/(47-60) 111/60  SpO2:  [91 %-92 %] 92 %  Body mass index is 18 62 kg/m²  Input and Output Summary (last 24 hours): Intake/Output Summary (Last 24 hours) at 2022 1654  Last data filed at 2022 1438  Gross per 24 hour   Intake 955 ml   Output 2250 ml   Net -1295 ml       Physical Exam:   Physical Exam  Constitutional:       General: She is not in acute distress  HENT:      Head: Normocephalic and atraumatic  Nose: Nose normal    Eyes:      General: No scleral icterus  Cardiovascular:      Rate and Rhythm: Normal rate and regular rhythm  Heart sounds: No murmur heard  Pulmonary:      Comments: Decreased breath sounds bilateral  Abdominal:      General: Abdomen is flat  Comments: Feeding tube present   Neurological:      Mental Status: She is alert            Additional Data:     Labs:  Results from last 7 days   Lab Units 22  0657   WBC Thousand/uL 10 86*   HEMOGLOBIN g/dL 7 8*   HEMATOCRIT % 25 3*   PLATELETS Thousands/uL 496*   NEUTROS PCT % 63   LYMPHS PCT % 17   MONOS PCT % 8   EOS PCT % 11*     Results from last 7 days   Lab Units 22  0934 22  0657   SODIUM mmol/L  --  131*   POTASSIUM mmol/L  --  5 1 CHLORIDE mmol/L  --  97   CO2 mmol/L  --  27   BUN mg/dL  --  24   CREATININE mg/dL  --  1 54*   ANION GAP mmol/L  --  7   CALCIUM mg/dL  --  8 3   ALBUMIN g/dL 1 7*  --    TOTAL BILIRUBIN mg/dL 0 20  --    ALK PHOS U/L 211*  --    ALT U/L 87*  --    AST U/L 146*  --    GLUCOSE RANDOM mg/dL  --  81         Results from last 7 days   Lab Units 09/19/22  1248 09/19/22  0634 09/18/22  1615 09/18/22  1042 09/18/22  0627 09/18/22  0055 09/17/22  1650 09/16/22  1756 09/16/22  0626 09/16/22  0041 09/15/22  1809 09/15/22  0604   POC GLUCOSE mg/dl 129 137 97 145* 122 122 114 133 106 103 93 106         Results from last 7 days   Lab Units 09/13/22  0539   PROCALCITONIN ng/ml 0 27*       Lines/Drains:  Invasive Devices  Report    Peripheral Intravenous Line  Duration           Peripheral IV 09/18/22 Left;Proximal;Ventral (anterior) Forearm <1 day    Peripheral IV 09/19/22 Right Antecubital <1 day          Drain  Duration           External Urinary Catheter 44 days    Rectal Tube With balloon 19 days    Gastrostomy/Enterostomy Percutaneous Endoscopic Gastrostomy (PEG) 20 Fr  LUQ 11 days                      Imaging: Personally reviewed the following imaging: KUB    Recent Cultures (last 7 days):         Last 24 Hours Medication List:   Current Facility-Administered Medications   Medication Dose Route Frequency Provider Last Rate   • acetaminophen  650 mg Oral Q6H PRN Jovita Mcdermott DO     • ALPRAZolam  0 25 mg Oral TID PRN Lanerinn Urias, DO     • alteplase  2 mg Intracatheter Once [employer], PA-C     • bisacodyl  10 mg Rectal Daily PRN Nan Teixeira MD     • clonazePAM  3 mg Oral HS Jovita Mcdermott, DO     • folic acid  1 mg Oral Daily Jovitakash Mcdermott DO     • heparin (porcine)  5,000 Units Subcutaneous Q8H [facility] Kaylan Arroyo,      • levothyroxine  112 mcg Oral Early Morning Jovita Mcdermott DO     • magnesium hydroxide  30 mL Oral Daily PRN Nan Teixeira MD     • metoclopramide  10 mg Intravenous Q6H PRN Juan Allyson Brunner MD     • midodrine  5 mg Oral TID AC Jovita Mcdermott DO     • ondansetron  4 mg Intravenous Q6H PRN Meet Mohr DO     • oxyCODONE  5 mg Oral Q6H Hannah Hwang MD     • pantoprazole  40 mg Oral BID AC Kaylan Arroyo DO     • polyethylene glycol  17 g Oral Daily PRN Hannah Hwang MD     • sucralfate  1 g Oral 4x Daily (AC & HS) Jean Carlos Cortez DO     • thiamine  100 mg Oral Daily Jovita Mcdermott DO     • traZODone  150 mg Oral HS Meet Mohr DO          Today, Patient Was Seen By: Omid Bell MD    **Please Note: This note may have been constructed using a voice recognition system  **

## 2022-09-19 NOTE — ASSESSMENT & PLAN NOTE
· Recent hx of staph epi bacteremia in 7/2022, presumed due to picc line  · Now with possible recurrence 1/2 set staph epi, 2nd set no growth thus far  · Had been on IV vancomycin but discontinued due to fluctuating levels  Completed vancomycin to daptomycin courses 9/15  · S/p TAVIA (9/9) no evidence of vegetation  · Repeat blood cx neg x 5 days   · TPN discontinued   S/p PEG placement  · PICC line discontinued

## 2022-09-20 NOTE — PROGRESS NOTES
[address] [facility]  Progress Note - Twila Ramirez 1945, 76 y o  female MRN: [id number]  Unit/Bed#: [facility] 317-01 Encounter: [id number]  Primary Care Provider: Juve Manuel MD   Date and time admitted to hospital: 8/30/2022 11:49 PM    * Sepsis ([address] )  Assessment & Plan  · Resolved  · WBC Of 12 5 after PEG feed was started but no fever, chills, tachycardia or tachypnea   · Initial source was bacteremia, C diff colitis  ID appreciated  · + evidence of colitis on ct scan  Status post C diff panel; positive toxin PCR but negative EIA  On p o  Vancomycin through 9/18  Diarrhea resolved  · Positive recurrent bacteremia  Presumed secondary to PICC line with use of TPN  Prior history of recent MSSE  · Repeat blood cx neg after 5 days  · Discontinued presenting PICC  New PICC ordered (9/9) which was dc'ed after abx iv daptomycin completed  · WBC normalized today      Bacteremia  Assessment & Plan  · Recent hx of staph epi bacteremia in 7/2022, presumed due to picc line  · Now with possible recurrence 1/2 set staph epi, 2nd set no growth thus far  · Had been on IV vancomycin but discontinued due to fluctuating levels  Completed vancomycin to daptomycin courses 9/15  · S/p TAVIA (9/9) no evidence of vegetation  · Repeat blood cx neg x 5 days   · TPN discontinued  S/p PEG placement  · PICC line discontinued      H/O bariatric surgery - bypass  Assessment & Plan  · Patient with history of gastric bypass surgery  Has had significant malnutrition issues and anastomotic ulcerations  Seen by Bariatric Service during recent hospitalization at [facility] who recommended prolonged course of TPN for nutritional optimization and possible revision of surgery in the future  · Was planned to have appt with bariatric sx on 8/20 however had to be rescheduled  · Given recurrent bacteremia; s/p GI consult; recommended discontinuation of TPN  S/p PEG, TF initiated  · P o   Diet for pleasure feeding resumed  · To f/u with bariatric surgery      Transaminitis  Assessment & Plan  · Noted to have trending transaminitis  · Patient refused USG  · Trend LFTs- trending down today    Left upper quadrant abdominal pain  Assessment & Plan  Likely secondary to severe constipation, patient with small bowel movements today with significant improvement in pain  KUB suggestive of large volume stool burden, this improved with constipation however patient continues to complain of constant pain    Suspected etiologies include residual pain from C diff colitis though this should be resolved by now versus secondary to irritation of stomach from PEG tube placement    Continue with bowel regimen    Acute encephalopathy  Assessment & Plan  · Resolved  · Secondary to hospital induced delirium vs metabolic encephalopathy  · No focal deficit on exam  · Delirium precautions      Goals of care, counseling/discussion  Assessment & Plan  · Palliative on board  Family meeting held with prior provider:  patient has had a change of heart and wants to be txt focused and proceed with PEG placement  She states that she realized that she is not ready to die  · 9/15:  [place] discussion held with patient regarding likely outcomes of either cardiac arrest or respiratory failure requiring intubation given her current clinical state    Patient states she wishes to be DNR DNI with aggressive care      Acute on chronic anemia  Assessment & Plan  · Evaluated by GI prior  · 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 )  Assessment & Plan  Malnutrition Findings:   Adult Malnutrition type: Chronic illness  Adult Degree of Malnutrition: Other severe protein calorie malnutrition  Malnutrition Characteristics: Weight loss, Muscle loss     On Jevity 1 2 - resume at lower max rate   Checked CMP and it shows hyponatremia of 130, low albumin but mildly elevated AST and A phos    360 Statement: Severe/Chronic malnutrition r/t condition, as evidenced by 4 8% wt loss x < 1 week (9/2/22: 125#, 9/5/22: 119#), and severe muscle mass depletion (temples/clavicle)  Treated with liberalized diet and nutrition supplements, possibly nutrition support pending goals of care    BMI Findings: Body mass index is 18 2 kg/m²  Hx of Savage-en Y gastric bypass, 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  Now off due to recurrent bacteremia  S/p PEG placement and on PO feeds  Cont nutritional supplements    Ambulatory dysfunction  Assessment & Plan  · Exacerbated by diarrhea/dehydration however patient is chronically malnourished  · S/p PT/OT evaluation; to return to SNF  · Family/PT wishing alternate SNF  CM on board to find replacement    NOAH (acute kidney injury) ([facility] )  Assessment & Plan  Cr renal function remains impaired but stable  Suspect volume depletion as main etiology, support with gentle hydration if needed, patient p o  Intake improved  Avoid all possible nephrotoxins  Monitor          VTE Pharmacologic Prophylaxis: VTE Score: 3 Moderate Risk (Score 3-4) - Pharmacological DVT Prophylaxis Ordered: heparin  Patient Centered Rounds: I performed bedside rounds with nursing staff today  Discussions with Specialists or Other Care Team Provider:     Education and Discussions with Family / Patient: Attempted to update  (sister) via phone  Left voicemail  Time Spent for Care: 20 minutes  More than 50% of total time spent on counseling and coordination of care as described above      Current Length of Stay: 20 day(s)  Current Patient Status: Inpatient   Certification Statement: The patient will continue to require additional inpatient hospital stay due to placement  Discharge Plan: Anticipate discharge in 24-48 hrs to discharge location to be determined pending rehab evaluations  Code Status: Level 3 - DNAR and DNI    Subjective:   Patient is resting in bed, denies any chest pain or shortness of breath  Says her pain in the left upper side of the abdomen has subsided  Denies any nausea or vomiting  As per note patient has refused therapy  And also refused ultrasound  Objective:     Vitals:   Temp (24hrs), Av 1 °F (36 7 °C), Min:98 1 °F (36 7 °C), Max:98 1 °F (36 7 °C)    Temp:  [98 1 °F (36 7 °C)] 98 1 °F (36 7 °C)  HR:  [88] 88  Resp:  [16] 16  BP: (104)/(54) 104/54  SpO2:  [94 %] 94 %  Body mass index is 18 2 kg/m²  Input and Output Summary (last 24 hours): Intake/Output Summary (Last 24 hours) at 2022 1813  Last data filed at 2022 0534  Gross per 24 hour   Intake 443 ml   Output 650 ml   Net -207 ml       Physical Exam:   Physical Exam  Vitals and nursing note reviewed  Constitutional:       General: She is not in acute distress  Appearance: She is well-developed  She is ill-appearing  HENT:      Head: Normocephalic and atraumatic  Mouth/Throat:      Mouth: Mucous membranes are moist    Eyes:      Conjunctiva/sclera: Conjunctivae normal    Cardiovascular:      Rate and Rhythm: Normal rate and regular rhythm  Heart sounds: No murmur heard  Pulmonary:      Effort: Pulmonary effort is normal  No respiratory distress  Breath sounds: Normal breath sounds  Abdominal:      Palpations: Abdomen is soft  Tenderness: There is no abdominal tenderness  Comments: Peg in palce   Musculoskeletal:         General: Normal range of motion  Cervical back: Neck supple  Skin:     General: Skin is warm and dry     Neurological:      General: No focal deficit present  Mental Status: She is alert           Additional Data:     Labs:  Results from last 7 days   Lab Units 09/20/22  0540   WBC Thousand/uL 8 33   HEMOGLOBIN g/dL 7 4*   HEMATOCRIT % 24 0*   PLATELETS Thousands/uL 492*   NEUTROS PCT % 53   LYMPHS PCT % 25   MONOS PCT % 10   EOS PCT % 11*     Results from last 7 days   Lab Units 09/20/22  0540   SODIUM mmol/L 132*   POTASSIUM mmol/L 4 8   CHLORIDE mmol/L 99   CO2 mmol/L 28   BUN mg/dL 25   CREATININE mg/dL 1 51*   ANION GAP mmol/L 5   CALCIUM mg/dL 8 4   ALBUMIN g/dL 1 8*   TOTAL BILIRUBIN mg/dL 0 24   ALK PHOS U/L 181*   ALT U/L 56   AST U/L 72*   GLUCOSE RANDOM mg/dL 107         Results from last 7 days   Lab Units 09/20/22  1143 09/20/22  0625 09/20/22  0050 09/19/22  1700 09/19/22  1248 09/19/22  0634 09/18/22  1615 09/18/22  1042 09/18/22  0627 09/18/22  0055 09/17/22  1650 09/16/22  1756   POC GLUCOSE mg/dl 109 125 129 93 129 137 97 145* 122 122 114 133               Lines/Drains:  Invasive Devices  Report    Peripheral Intravenous Line  Duration           Peripheral IV 09/19/22 Right Antecubital 1 day          Drain  Duration           External Urinary Catheter 45 days    Gastrostomy/Enterostomy Percutaneous Endoscopic Gastrostomy (PEG) 20 Fr  LUQ 12 days                      Imaging: Reviewed radiology reports from this admission including: xray(s)    Recent Cultures (last 7 days):         Last 24 Hours Medication List:   Current Facility-Administered Medications   Medication Dose Route Frequency Provider Last Rate   • acetaminophen  650 mg Oral Q6H PRN Jovita Mcdermott, DO     • ALPRAZolam  0 25 mg Oral TID PRN Marcta Flaquito, DO     • alteplase  2 mg Intracatheter Once [employer], PA-C     • bisacodyl  10 mg Rectal Daily PRN Blane Darden MD     • clonazePAM  3 mg Oral HS Jovita Mcdermott, DO     • folic acid  1 mg Oral Daily Jovita Mcdermott, DO     • heparin (porcine)  5,000 Units Subcutaneous [facility] Sangeetha Huddleston, DO     • levothyroxine  112 mcg Oral Early Morning Jovitakash Mcdermott, DO     • magnesium hydroxide  30 mL Oral Daily PRN Isidra Lopez MD     • metoclopramide  10 mg Intravenous Q6H PRN Isidra Lopez MD     • midodrine  5 mg Oral TID AC Jovita Mcdermott, DO     • ondansetron  4 mg Intravenous Q6H PRN Hayes Booker, DO     • oxyCODONE  5 mg Oral Q6H Isidra Lopez MD     • pantoprazole  40 mg Oral BID AC Kaylan Arroyo DO     • polyethylene glycol  17 g Oral Daily Alfonso Saba MD     • senna  2 tablet Oral HS Alfonso Saba MD     • sucralfate  1 g Oral 4x Daily (AC & HS) Gerhardt Grams, DO     • thiamine  100 mg Oral Daily Jovitakash Mcdermott, DO     • traZODone  150 mg Oral HS Hayes Booker, DO          Today, Patient Was Seen By: Diaz Spear MD    **Please Note: This note may have been constructed using a voice recognition system  **

## 2022-09-20 NOTE — PLAN OF CARE
Problem: Potential for Falls  Goal: Patient will remain free of falls  Description: INTERVENTIONS:  - Educate patient/family on patient safety including physical limitations  - Instruct patient to call for assistance with activity   - Consult OT/PT to assist with strengthening/mobility   - Keep Call bell within reach  - Keep bed low and locked with side rails adjusted as appropriate  - Keep care items and personal belongings within reach  - Initiate and maintain comfort rounds  - Make Fall Risk Sign visible to staff  - Apply yellow socks and bracelet for high fall risk patients  - Consider moving patient to room near nurses station  Outcome: Progressing     Problem: MOBILITY - ADULT  Goal: Maintain or return to baseline ADL function  Description: INTERVENTIONS:  -  Assess patient's ability to carry out ADLs; assess patient's baseline for ADL function and identify physical deficits which impact ability to perform ADLs (bathing, care of mouth/teeth, toileting, grooming, dressing, etc )  - Assess/evaluate cause of self-care deficits   - Assess range of motion  - Assess patient's mobility; develop plan if impaired  - Assess patient's need for assistive devices and provide as appropriate  - Encourage maximum independence but intervene and supervise when necessary  - Involve family in performance of ADLs  - Assess for home care needs following discharge   - Consider OT consult to assist with ADL evaluation and planning for discharge  - Provide patient education as appropriate  Outcome: Progressing  Goal: Maintains/Returns to pre admission functional level  Description: INTERVENTIONS:  - Perform BMAT or MOVE assessment daily    - Set and communicate daily mobility goal to care team and patient/family/caregiver     - Collaborate with rehabilitation services on mobility goals if consulted  - Out of bed for toileting  - Record patient progress and toleration of activity level   Outcome: Progressing     Problem: INFECTION - ADULT  Goal: Absence or prevention of progression during hospitalization  Description: INTERVENTIONS:  - Assess and monitor for signs and symptoms of infection  - Monitor lab/diagnostic results  - Monitor all insertion sites, i e  indwelling lines, tubes, and drains  - Monitor endotracheal if appropriate and nasal secretions for changes in amount and color  - [place] appropriate cooling/warming therapies per order  - Administer medications as ordered  - Instruct and encourage patient and family to use good hand hygiene technique  - Identify and instruct in appropriate isolation precautions for identified infection/condition  Outcome: Progressing  Goal: Absence of fever/infection during neutropenic period  Description: INTERVENTIONS:  - Monitor WBC    Outcome: Progressing     Problem: SAFETY ADULT  Goal: Patient will remain free of falls  Description: INTERVENTIONS:  - Educate patient/family on patient safety including physical limitations  - Instruct patient to call for assistance with activity   - Consult OT/PT to assist with strengthening/mobility   - Keep Call bell within reach  - Keep bed low and locked with side rails adjusted as appropriate  - Keep care items and personal belongings within reach  - Initiate and maintain comfort rounds  - Make Fall Risk Sign visible to staff  - Apply yellow socks and bracelet for high fall risk patients  - Consider moving patient to room near nurses station  Outcome: Progressing  Goal: Maintain or return to baseline ADL function  Description: INTERVENTIONS:  -  Assess patient's ability to carry out ADLs; assess patient's baseline for ADL function and identify physical deficits which impact ability to perform ADLs (bathing, care of mouth/teeth, toileting, grooming, dressing, etc )  - Assess/evaluate cause of self-care deficits   - Assess range of motion  - Assess patient's mobility; develop plan if impaired  - Assess patient's need for assistive devices and provide as appropriate  - Encourage maximum independence but intervene and supervise when necessary  - Involve family in performance of ADLs  - Assess for home care needs following discharge   - Consider OT consult to assist with ADL evaluation and planning for discharge  - Provide patient education as appropriate  Outcome: Progressing  Goal: Maintains/Returns to pre admission functional level  Description: INTERVENTIONS:  - Perform BMAT or MOVE assessment daily    - Set and communicate daily mobility goal to care team and patient/family/caregiver  - Collaborate with rehabilitation services on mobility goals if consulted  - Out of bed for toileting  - Record patient progress and toleration of activity level   Outcome: Progressing     Problem: DISCHARGE PLANNING  Goal: Discharge to home or other facility with appropriate resources  Description: INTERVENTIONS:  - Identify barriers to discharge w/patient and caregiver  - Arrange for needed discharge resources and transportation as appropriate  - Identify discharge learning needs (meds, wound care, etc )  - Arrange for interpretive services to assist at discharge as needed  - Refer to Case Management Department for coordinating discharge planning if the patient needs post-hospital services based on physician/advanced practitioner order or complex needs related to functional status, cognitive ability, or social support system  Outcome: Progressing     Problem: Knowledge Deficit  Goal: Patient/family/caregiver demonstrates understanding of disease process, treatment plan, medications, and discharge instructions  Description: Complete learning assessment and assess knowledge base    Interventions:  - Provide teaching at level of understanding  - Provide teaching via preferred learning methods  Outcome: Progressing     Problem: SKIN/TISSUE INTEGRITY - ADULT  Goal: Skin Integrity remains intact(Skin Breakdown Prevention)  Description: Assess:  -Inspect skin when repositioning, toileting, and assisting with ADLS  -Assess extremities for adequate circulation and sensation     Bed Management:  -Have minimal linens on bed & keep smooth, unwrinkled  -Change linens as needed when moist or perspiring    Toileting:  -Offer bedside commode    Activity:  -Encourage activity and walks on unit  -Encourage or provide ROM exercises   -Use appropriate equipment to lift or move patient in bed      Skin Care:  -Avoid use of baby powder, tape, friction and shearing, hot water or constrictive clothing  -Do not massage red bony areas      Outcome: Progressing  Goal: Incision(s), wounds(s) or drain site(s) healing without S/S of infection  Description: INTERVENTIONS  - Assess and document dressing, incision, wound bed, drain sites and surrounding tissue  - Provide patient and family education    Outcome: Progressing  Goal: Pressure injury heals and does not worsen  Description: Interventions:  - Implement low air loss mattress or specialty surface (Criteria met)  - Apply silicone foam dressing  - Apply fecal or urinary incontinence containment device    - Utilize friction reducing device or surface for transfers   - Consider nutrition services referral as needed  Outcome: Progressing     Problem: Prexisting or High Potential for Compromised Skin Integrity  Goal: Skin integrity is maintained or improved  Description: INTERVENTIONS:  - Identify patients at risk for skin breakdown  - Assess and monitor skin integrity  - Assess and monitor nutrition and hydration status  - Monitor labs   - Assess for incontinence   - Turn and reposition patient  - Assist with mobility/ambulation  - Relieve pressure over bony prominences  - Avoid friction and shearing  - Provide appropriate hygiene as needed including keeping skin clean and dry  - Evaluate need for skin moisturizer/barrier cream  - Collaborate with interdisciplinary team   - Patient/family teaching  - Consider wound care consult   Outcome: Progressing     Problem: Nutrition/Hydration-ADULT  Goal: Nutrient/Hydration intake appropriate for improving, restoring or maintaining nutritional needs  Description: Monitor and assess patient's nutrition/hydration status for malnutrition  Collaborate with interdisciplinary team and initiate plan and interventions as ordered  Monitor patient's weight and dietary intake as ordered or per policy  Utilize nutrition screening tool and intervene as necessary  Determine patient's food preferences and provide high-protein, high-caloric foods as appropriate       INTERVENTIONS:  - Monitor oral intake, urinary output, labs, and treatment plans  - Assess nutrition and hydration status and recommend course of action  - Evaluate amount of meals eaten  - Assist patient with eating if necessary   - Allow adequate time for meals  - Recommend/ encourage appropriate diets, oral nutritional supplements, and vitamin/mineral supplements  - Order, calculate, and assess calorie counts as needed  - Recommend, monitor, and adjust tube feedings and TPN/PPN based on assessed needs  - Assess need for intravenous fluids  - Provide specific nutrition/hydration education as appropriate  - Include patient/family/caregiver in decisions related to nutrition  Outcome: Progressing

## 2022-09-20 NOTE — NURSING NOTE
Patient asked if she ate dinner in order to facilitate abdominal ultrasound  Patient refusing to answer at this time  Patient made aware that an abdominal ultrasound was ordered, and that test would be completed tonight depending on the time of her last food intake  Patient refusing ultrasound at this time stating that hospital staff should "go fuck themselves"  Ultrasound staff made aware of patient's refusal via [employer], will attempt to schedule test tomorrow

## 2022-09-20 NOTE — PHYSICAL THERAPY NOTE
Physical Therapy Cancellation Note      Chart review completed  PT attempted to see pt although pt refusing therapy at this time  Offered to return later in the day although pt reported she would not work with therapy today       Meet Luna, PT

## 2022-09-20 NOTE — OCCUPATIONAL THERAPY NOTE
Occupational Therapy Cancel Note        Patient Name: Rock Mann  RFGNC'[id number] Date: 9/20/2022 09/20/22 1356   OT Last Visit   OT Visit Date 09/20/22   Note Type   Note Type Treatment   Cancel Reasons Other     OT attempted to see pt for co-treat with PT this afternoon however pt declining @ this time reporting "I know you are sincere, but with everything going on, I do not want to do anything today " OT offered to downgrade functional tasks to meet just right challenge however pt continuing to decline @ this time  Will continue to follow on caseload and see when appropriate      Elder Smoke, MS, OTR/L

## 2022-09-20 NOTE — ASSESSMENT & PLAN NOTE
· Patient with history of gastric bypass surgery  Has had significant malnutrition issues and anastomotic ulcerations  Seen by Bariatric Service during recent hospitalization at [facility] who recommended prolonged course of TPN for nutritional optimization and possible revision of surgery in the future  · Was planned to have appt with bariatric sx on 8/20 however had to be rescheduled  · Given recurrent bacteremia; s/p GI consult; recommended discontinuation of TPN  S/p PEG, TF initiated  · P o   Diet for pleasure feeding resumed  · To f/u with bariatric surgery

## 2022-09-20 NOTE — ASSESSMENT & PLAN NOTE
· Palliative on board  Family meeting held with prior provider:  patient has had a change of heart and wants to be txt focused and proceed with PEG placement  She states that she realized that she is not ready to die  · 9/15:  Maureen Marie discussion held with patient regarding likely outcomes of either cardiac arrest or respiratory failure requiring intubation given her current clinical state    Patient states she wishes to be DNR DNI with aggressive care

## 2022-09-20 NOTE — WOUND OSTOMY CARE
Consult Note - Wound   Cayetano Blunt 76 y o  female MRN: [id number]  Unit/Bed#: [facility] 317-01 Encounter: [id number]      History and Present Illness:  Patient is 75 yo female admitted to [facility] with colitis due to possible c-diff infection  Patient is min assist with turning from side to side  Patient has Simone Comings in place for urinary incontinence and is incontinent of bowel  Patient is independent with meals but receives tube feed supplementation for poor nutrition intake  Patient is thin and frail in appearance, P-500 specialty mattress       Assessment Findings:       1  POA unstageable sacrum- small, oval full thickness wound with 100% moist yellow slough, small amount of drainage  Unable to full appreciate wound depth due to slough tissue  Unstageable wounds have the potential to evolve into full thickness stage III or stage IV wounds       No induration, fluctuance, odor, warmth/temperature differences, redness, or purulence noted to the above noted wounds and skin areas assessed  New dressings applied per orders listed below  Patient tolerated well- no s/s of non-verbal pain or discomfort observed during the encounter  Bedside nurse aware of plan of care  See flow sheets for more detailed assessment findings  Wound care will continue to follow       Skin care Plan:  1-Cleanse sacro-buttocks with soap and water  Apply Triad paste to wound bed and cover with Allevyn foam  Sami with T for treatment  Change everyday and PRN  2-Turn/reposition q2h or when medically stable for pressure re-distribution on skin   3-Elevate heels to offload pressure  4-Moisturize skin daily with skin nourishing cream  5-Ehob cushion in chair when out of bed  6-Hydraguard to bilateral heels BID and PRN        Wounds:  Wound 07/26/22 Pressure Injury Sacrum Mid (Active)   Wound Image   09/20/22 1015   Wound Description Slough 09/20/22 1015   Pressure Injury Stage U 09/20/22 1015   Karyn-wound Assessment Hyperpigmented; Purple 09/20/22 1015   Wound Length (cm) 1 5 cm 09/20/22 1015   Wound Width (cm) 0 6 cm 09/20/22 1015   Wound Depth (cm) 0 2 cm 09/20/22 1015   Wound Surface Area (cm^2) 0 9 cm^2 09/20/22 1015   Wound Volume (cm^3) 0 18 cm^3 09/20/22 1015   Calculated Wound Volume (cm^3) 0 18 cm^3 09/20/22 1015   Change in Wound Size % 10 09/20/22 1015   Tunneling 0 cm 09/20/22 1015   Tunneling in depth located at 0 09/20/22 1015   Undermining 0 3 09/20/22 1015   Undermining is depth extending from 1-6 09/20/22 1015   Wound Site Closure ALICIA 09/20/22 1015   Drainage Amount Small 09/20/22 1015   Drainage Description Serosanguineous 09/20/22 1015   Non-staged Wound Description Full thickness 09/20/22 1015   Treatments Cleansed 09/20/22 1015   Dressing Foam, Silicon (eg  Allevyn, etc); Moisture barrier 09/20/22 1015   Wound packed? No 09/20/22 1015   Packing- # removed 0 09/20/22 1015   Packing- # inserted 0 09/20/22 1015   Dressing Changed New 09/20/22 1015   Patient Tolerance Tolerated well 09/20/22 1015   Dressing Status Clean;Dry; Intact 09/20/22 1015         Belen Gusman BSN, RN, [facility] [employer]

## 2022-09-20 NOTE — ASSESSMENT & PLAN NOTE
· Resolved  · WBC Of 12 5 after PEG feed was started but no fever, chills, tachycardia or tachypnea   · Initial source was bacteremia, C diff colitis  ID appreciated  · + evidence of colitis on ct scan  Status post C diff panel; positive toxin PCR but negative EIA  On p o  Vancomycin through 9/18  Diarrhea resolved  · Positive recurrent bacteremia  Presumed secondary to PICC line with use of TPN  Prior history of recent MSSE  · Repeat blood cx neg after 5 days  · Discontinued presenting PICC   New PICC ordered (9/9) which was dc'ed after abx iv daptomycin completed  · WBC normalized today

## 2022-09-20 NOTE — ASSESSMENT & PLAN NOTE
Malnutrition Findings:   Adult Malnutrition type: Chronic illness  Adult Degree of Malnutrition: Other severe protein calorie malnutrition  Malnutrition Characteristics: Weight loss, Muscle loss     On Jevity 1 2 - resume at lower max rate   Checked CMP and it shows hyponatremia of 130, low albumin but mildly elevated AST and A phos    360 Statement: Severe/Chronic malnutrition r/t condition, as evidenced by 4 8% wt loss x < 1 week (9/2/22: 125#, 9/5/22: 119#), and severe muscle mass depletion (temples/clavicle)  Treated with liberalized diet and nutrition supplements, possibly nutrition support pending goals of care    BMI Findings: Body mass index is 18 2 kg/m²  Hx of Savage-en Y gastric bypass, 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

## 2022-09-21 NOTE — ASSESSMENT & PLAN NOTE
· Resolved  · WBC Of 12 5 after PEG feed was started but no fever, chills, tachycardia or tachypnea   · Initial source was bacteremia, C diff colitis  ID was consulted  · + evidence of colitis on ct scan  Status post C diff panel; positive toxin PCR but negative EIA  On p o  Vancomycin through 9/18  Diarrhea resolved  · Positive recurrent bacteremia  Presumed secondary to PICC line with use of TPN  Prior history of recent MSSE  · Repeat blood cx neg after 5 days  · Discontinued presenting PICC   New PICC ordered (9/9) which was dc'ed after abx iv daptomycin completed  · WBC normalized

## 2022-09-21 NOTE — PLAN OF CARE
Problem: OCCUPATIONAL THERAPY ADULT  Goal: Performs self-care activities at highest level of function for planned discharge setting  See evaluation for individualized goals  Description: Treatment Interventions: ADL retraining, Functional transfer training, Endurance training, UE strengthening/ROM, Cognitive reorientation, Patient/family training, Equipment evaluation/education, Compensatory technique education, Continued evaluation, Energy conservation, Activityengagement          See flowsheet documentation for full assessment, interventions and recommendations  Outcome: Progressing  Note: Limitation: Decreased ADL status, Decreased endurance, Decreased self-care trans, Decreased high-level ADLs  Prognosis: Fair  Assessment: Patient participated in Skilled OT session this date with interventions consisting of  ADL re-training, bed mobility, functional transfers  Pt initially hesitant to participate and c/o multiple grievances including the food, cold coffee, wet bed sheets  Pt requiring MAX encouragement to perform self-care tasks independently  Pt was particular about how her linens were changed and provided detailed descriptions of how she normally does things  Pt required extensive time for ace hygiene and bed linen changes; able to initiate bridge position and rolled L/R with MIN A  Pt initially agreeable to sitting EOB - however then appeared confused/forgetful and asked "what are we doing?" Pt requiring MIN A for UB ADLs and MAX A for LB ADLs in supine with HOB elevated  Patient continues to be functioning below baseline level, occupational performance remains limited secondary to factors listed above and increased risk for falls and injury  From OT standpoint, recommendation at time of d/c would be [phone] with possibility of LTC    Patient to benefit from continued Occupational Therapy treatment while in the hospital to address deficits as defined above and maximize level of functional independence with ADLs and functional mobility       OT Discharge Recommendation: Post acute rehabilitation services

## 2022-09-21 NOTE — ASSESSMENT & PLAN NOTE
Likely secondary to severe constipation, patient with small bowel movements today with significant improvement in pain  KUB suggestive of large volume stool burden,  Pain has subsided but pending ultrasound was ordered few days ago scheduled for today

## 2022-09-21 NOTE — ASSESSMENT & PLAN NOTE
· Palliative on board  Family meeting held with prior provider:  patient has had a change of heart and wants to be txt focused and proceed with PEG placement  She states that she realized that she is not ready to die  · 9/15:  [place]da [place] discussion held with patient regarding likely outcomes of either cardiac arrest or respiratory failure requiring intubation given her current clinical state    Patient states she wishes to be DNR DNI with aggressive care

## 2022-09-21 NOTE — ASSESSMENT & PLAN NOTE
· Recent hx of staph epi bacteremia in 7/2022, presumed due to picc line  ·  recurrence 1/2 set staph epi, 2nd set no growth thus far  · Had been on IV vancomycin but discontinued due to fluctuating levels  Completed vancomycin to daptomycin courses 9/15  · S/p TAVIA (9/9) no evidence of vegetation  · Repeat blood cx neg x 5 days   · TPN discontinued   S/p PEG placement  · PICC line discontinued

## 2022-09-21 NOTE — PROGRESS NOTES
[address] [facility]  Progress Note - Spring Iverson 1945, 76 y o  female MRN: [id number]  Unit/Bed#: [facility] 317-01 Encounter: [id number]  Primary Care Provider: Peryr Barcenas MD   Date and time admitted to hospital: 8/30/2022 11:49 PM    * Sepsis ([address] )  Assessment & Plan  · Resolved  · WBC Of 12 5 after PEG feed was started but no fever, chills, tachycardia or tachypnea   · Initial source was bacteremia, C diff colitis  ID was consulted  · + evidence of colitis on ct scan  Status post C diff panel; positive toxin PCR but negative EIA  On p o  Vancomycin through 9/18  Diarrhea resolved  · Positive recurrent bacteremia  Presumed secondary to PICC line with use of TPN  Prior history of recent MSSE  · Repeat blood cx neg after 5 days  · Discontinued presenting PICC  New PICC ordered (9/9) which was dc'ed after abx iv daptomycin completed  · WBC normalized       Bacteremia  Assessment & Plan  · Recent hx of staph epi bacteremia in 7/2022, presumed due to picc line  ·  recurrence 1/2 set staph epi, 2nd set no growth thus far  · Had been on IV vancomycin but discontinued due to fluctuating levels  Completed vancomycin to daptomycin courses 9/15  · S/p TAVIA (9/9) no evidence of vegetation  · Repeat blood cx neg x 5 days   · TPN discontinued  S/p PEG placement  · PICC line discontinued      H/O bariatric surgery - bypass  Assessment & Plan  · Patient with history of gastric bypass surgery  Has had significant malnutrition issues and anastomotic ulcerations  Seen by Bariatric Service during recent hospitalization at [facility] who recommended prolonged course of TPN for nutritional optimization and possible revision of surgery in the future  · Was planned to have appt with bariatric sx on 8/20 however had to be rescheduled  · Given recurrent bacteremia; s/p GI consult; recommended discontinuation of TPN  S/p PEG, TF initiated  · P o   Diet for pleasure feeding resumed  · To f/u with bariatric surgery      Transaminitis  Assessment & Plan  · Noted to have trending transaminitis  · Patient refused USG  · Trend LFTs- trending down today    Left upper quadrant abdominal pain  Assessment & Plan  Likely secondary to severe constipation, patient with small bowel movements today with significant improvement in pain  KUB suggestive of large volume stool burden,  Pain has subsided but pending ultrasound was ordered few days ago scheduled for today    Acute encephalopathy  Assessment & Plan  · Resolved  · Secondary to hospital induced delirium vs metabolic encephalopathy  · No focal deficit on exam  · Delirium precautions      Goals of care, counseling/discussion  Assessment & Plan  · Palliative on board  Family meeting held with prior provider:  patient has had a change of heart and wants to be txt focused and proceed with PEG placement  She states that she realized that she is not ready to die  · 9/15:  Laura Hamilton discussion held with patient regarding likely outcomes of either cardiac arrest or respiratory failure requiring intubation given her current clinical state    Patient states she wishes to be DNR DNI with aggressive care      Acute on chronic anemia  Assessment & Plan  · Evaluated by GI prior  · 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  · S/p recent EGD in 7/22 revealing: Residual marginal ulcer of the gastrojejunostomy anastomosis with improved size  · S/p EGD 9/2 revealing: Large, cratered ulcer in the gastrojejunal anastomosis   · Received PRBC transfusion x 1 this admission, hemoglobin trending down 7 0 today continue monitoring transfuse less than 7  · Typed and screened for possible transfusion once hgb <7 0  · Cont PPI, BID, carafate    Moderate protein-calorie malnutrition ([address] )  Assessment & Plan  Malnutrition Findings:   Adult Malnutrition type: Chronic illness  Adult Degree of Malnutrition: Other severe protein calorie malnutrition  Malnutrition Characteristics: Weight loss, Muscle loss     On Jevity 1 2 - resume at lower max rate   Checked CMP and it shows hyponatremia of 130, low albumin but mildly elevated AST and A phos    360 Statement: Severe/Chronic malnutrition r/t condition, as evidenced by 4 8% wt loss x < 1 week (9/2/22: 125#, 9/5/22: 119#), and severe muscle mass depletion (temples/clavicle)  Treated with liberalized diet and nutrition supplements, possibly nutrition support pending goals of care    BMI Findings: Body mass index is 18 2 kg/m²  Hx of Savage-en Y gastric bypass, 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  Now off due to recurrent bacteremia  S/p PEG placement and on PO feeds  Cont nutritional supplements    Ambulatory dysfunction  Assessment & Plan  · Exacerbated by diarrhea/dehydration however patient is chronically malnourished  · S/p PT/OT evaluation; to return to SNF  · Family/PT wishing alternate SNF  CM on board to find replacement    NOAH (acute kidney injury) ([facility] [address] )  Assessment & Plan  Cr renal function remains impaired but stable  Suspect volume depletion as main etiology,   Start gentle IV hydration today  Avoid all possible nephrotoxins  Monitor    Hyponatremia  Assessment & Plan  Serum sodium low, on ivf,  Repeat BMP in a m  VTE Pharmacologic Prophylaxis: VTE Score: 3 Moderate Risk (Score 3-4) - Pharmacological DVT Prophylaxis Ordered: heparin  Patient Centered Rounds: I performed bedside rounds with nursing staff today  Discussions with Specialists or Other Care Team Provider:     Education and Discussions with Family / Patient: Attempted to update  (sister) via phone  Unable to contact  Sister called back later- updated her      Time Spent for Care: 20 minutes  More than 50% of total time spent on counseling and coordination of care as described above      Current Length of Stay: 21 day(s)  Current Patient Status: Inpatient   Certification Statement: The patient will continue to require additional inpatient hospital stay due to ruq pending- then placement  Discharge Plan: Anticipate discharge in 24-48 hrs to rehab facility  Code Status: Level 3 - DNAR and DNI    Subjective:   Resting in bed, assisted left abdominal pain has subsided but she does not like the PEG tube in her abdomen  Consult heart that it is required for her nutrition she is very deconditioned  She understands, denies any chest pain, shortness a breath, nausea, vomiting  Requested to be compliant with PT/OT today ,she agreed    Objective:     Vitals:   Temp (24hrs), Av 2 °F (36 8 °C), Min:98 1 °F (36 7 °C), Max:98 2 °F (36 8 °C)    Temp:  [98 1 °F (36 7 °C)-98 2 °F (36 8 °C)] 98 2 °F (36 8 °C)  HR:  [85-96] 85  Resp:  [18] 18  BP: (114-118)/(53-54) 114/53  SpO2:  [92 %-93 %] 93 %  Body mass index is 18 2 kg/m²  Input and Output Summary (last 24 hours):   No intake or output data in the 24 hours ending 22 1432    Physical Exam:   Physical Exam  Vitals and nursing note reviewed  Constitutional:       General: She is not in acute distress  Appearance: She is well-developed  She is ill-appearing  Comments: cachectic   HENT:      Head: Normocephalic and atraumatic  Mouth/Throat:      Mouth: Mucous membranes are dry  Eyes:      Conjunctiva/sclera: Conjunctivae normal    Cardiovascular:      Rate and Rhythm: Normal rate and regular rhythm  Heart sounds: No murmur heard  Pulmonary:      Effort: Pulmonary effort is normal  No respiratory distress  Breath sounds: Normal breath sounds  Abdominal:      General: Bowel sounds are normal       Palpations: Abdomen is soft  Tenderness: There is no abdominal tenderness  Comments: Peg in place   Musculoskeletal:         General: Normal range of motion  Cervical back: Neck supple  Skin:     General: Skin is warm and dry  Neurological:      General: No focal deficit present  Mental Status: She is alert     Psychiatric:         Mood and Affect: Mood normal           Additional Data:     Labs:  Results from last 7 days   Lab Units 09/21/22  1040   WBC Thousand/uL 8 61   HEMOGLOBIN g/dL 7 1*   HEMATOCRIT % 23 1*   PLATELETS Thousands/uL 508*   NEUTROS PCT % 58   LYMPHS PCT % 20   MONOS PCT % 10   EOS PCT % 11*     Results from last 7 days   Lab Units 09/21/22  1040 09/20/22  0540   SODIUM mmol/L 132* 132*   POTASSIUM mmol/L 4 6 4 8   CHLORIDE mmol/L 101 99   CO2 mmol/L 25 28   BUN mg/dL 27* 25   CREATININE mg/dL 1 65* 1 51*   ANION GAP mmol/L 6 5   CALCIUM mg/dL 8 2* 8 4   ALBUMIN g/dL  --  1 8*   TOTAL BILIRUBIN mg/dL  --  0 24   ALK PHOS U/L  --  181*   ALT U/L  --  56   AST U/L  --  72*   GLUCOSE RANDOM mg/dL 113 107         Results from last 7 days   Lab Units 09/21/22  1147 09/21/22  1145 09/21/22  0702 09/20/22  2335 09/20/22  2044 09/20/22  1143 09/20/22  0625 09/20/22  0050 09/19/22  1700 09/19/22  1248 09/19/22  0634 09/18/22  1615   POC GLUCOSE mg/dl 69 70 109 124 93 109 125 129 93 129 137 97               Lines/Drains:  Invasive Devices  Report    Peripheral Intravenous Line  Duration           Peripheral IV 09/19/22 Right Antecubital 2 days          Drain  Duration           External Urinary Catheter 46 days    Gastrostomy/Enterostomy Percutaneous Endoscopic Gastrostomy (PEG) 20 Fr  LUQ 12 days                      Imaging: Reviewed radiology reports from this admission including: xray(s)    Recent Cultures (last 7 days):         Last 24 Hours Medication List:   Current Facility-Administered Medications   Medication Dose Route Frequency Provider Last Rate   • acetaminophen  650 mg Oral Q6H PRN Jovita Mcdermott DO     • ALPRAZolam  0 25 mg Oral TID PRN Nakita Sifuentes DO     • alteplase  2 mg Intracatheter Once [employer], PA-C     • bisacodyl  10 mg Rectal Daily PRN Dana Dempsey MD     • clonazePAM  3 mg Oral HS Jovita Mcdermott DO     • dextrose 5 % and sodium chloride 0 9 %  50 mL/hr Intravenous Continuous Ira Merlos MD 50 mL/hr (15/27/54 2410)   • folic acid  1 mg Oral Daily Jovita Mcdermott, DO     • heparin (porcine)  5,000 Units Subcutaneous [facility] Anirudh , DO     • levothyroxine  112 mcg Oral Early Morning Jovita Baldemar, DO     • magnesium hydroxide  30 mL Oral Daily PRN Tyrell Nash MD     • metoclopramide  10 mg Intravenous Q6H PRN Tyrell Nash MD     • midodrine  5 mg Oral TID AC Jovita Mcdermott, DO     • ondansetron  4 mg Intravenous Q6H PRN Xi Jauregui DO     • oxyCODONE  5 mg Oral Q6H Tyrell Nash MD     • pantoprazole  40 mg Oral BID AC Kaylan Arroyo DO     • polyethylene glycol  17 g Oral Daily Laila Jaramillo MD     • senna  2 tablet Oral HS Laila Jaramillo MD     • sucralfate  1 g Oral 4x Daily (AC & HS) Anirudh , DO     • thiamine  100 mg Oral Daily Jovitakash Mcdermott, DO     • traZODone  150 mg Oral HS Xi Jauregui DO          Today, Patient Was Seen By: Martin Keen MD    **Please Note: This note may have been constructed using a voice recognition system  **

## 2022-09-21 NOTE — PHYSICAL THERAPY NOTE
PT Treatment       09/21/22 1016   PT Last Visit   PT Visit Date 09/21/22   Note Type   Note Type Treatment   Pain Assessment   Pain Assessment Tool FLACC   Pain Location/Orientation Location: Abdomen   Pain Onset/Description Frequency: Intermittent   Effect of Pain on Daily Activities limits participation in mobility   Hospital Pain Intervention(s) Repositioned; Ambulation/increased activity   Pain Rating: FLACC (Rest) - Face 0   Pain Rating: FLACC (Rest) - Legs 0   Pain Rating: FLACC (Rest) - Activity 0   Pain Rating: FLACC (Rest) - Cry 0   Pain Rating: FLACC (Rest) - Consolability 0   Score: FLACC (Rest) 0   Pain Rating: FLACC (Activity) - Face 1   Pain Rating: FLACC (Activity) - Legs 0   Pain Rating: FLACC (Activity) - Activity 0   Pain Rating: FLACC (Activity) - Cry 0   Pain Rating: FLACC (Activity) - Consolability 0   Score: FLACC (Activity) 1   Restrictions/Precautions   Other Precautions (S)  Contact/isolation; Fall Risk;Pain;Multiple lines; Bed Alarm  (c  diff; PEG tube)   General   Chart Reviewed Yes   Response to Previous Treatment Patient with no complaints from previous session  Family/Caregiver Present No   Cognition   Overall Cognitive Status Impaired   Arousal/Participation Alert   Attention Attends with cues to redirect   Orientation Level Oriented to person   Memory Decreased short term memory;Decreased recall of recent events   Following Commands Follows one step commands with increased time or repetition   Comments upon arrival, patient dismissive toward PT but expressed multiple grievences including how she was wet in the bed and no one was helping her  agreeable to participation in bed level hygiene tasks with assistance  at times she was agitated with comments therapist would make (ie patient would say- stop telling me what your doing and you can just do it)   overall sub-optimal effort and reduced participation in functional mobility   Subjective   Subjective "I am soaking wet"   Bed Mobility Rolling R 4  Minimal assistance   Additional items Assist x 1; Increased time required; Bedrails   Rolling L 4  Minimal assistance   Additional items Assist x 1;Bedrails   Supine to Sit 3  Moderate assistance   Additional items Assist x 1   Sit to Supine 3  Moderate assistance   Additional items Assist x 1   Additional Comments patient received in bed, reporting incontinence of  urine  patient was very particular and requested things to be done in certain manner/had to provide lengthy descriptions of how she normally does things prior to participating  patient required minAX1 to roll toward L and R sides with use of grab bar  patient incontinent of bowel and bladder on both bed pad and sheets and required increased time to participate in bed level and ace-hygiene care  incontinent for 2nd time requiring ongoing bed level care  Transfers   Sit to Stand 3  Moderate assistance   Additional items Assist x 1;Verbal cues   Stand to Sit 3  Moderate assistance   Additional items Assist x 1;Verbal cues   Additional Comments post ace-hygiene care in supine, patient participated in supine-->sit transfer with significant encouragement  she maintained fair- sitting balance EOB  She performed 1 sit-->stand transfer with mod-AX1 and HHA and maintained stance x 60 seconds while bed linens were adjusted to her preference  patient said she would consider sitting OOB in chair and therapist retrieved recliner and chair set up however patient did not like chair upon seeing it and refused OOB mobility  she returned to supine with mod-AX1 and required ongoing bed level care for proper positioning   Balance   Static Sitting Fair -   Static Standing Poor   Endurance Deficit   Endurance Deficit Yes   Endurance Deficit Description grossly deconditioning, reduced patient participation   Activity Tolerance   Activity Tolerance Patient limited by fatigue; Other (Comment)  (patient preference- reduced cooperation)   Nurse Made Aware nelli to see per RN Dory and f/u post   Exercises   Neuro re-ed rolling, supine<-->sit and sit<-->stand transfer   Assessment   Problem List Decreased strength;Decreased endurance; Impaired balance;Decreased mobility; Decreased skin integrity; Decreased safety awareness; Impaired judgement   Assessment PT initiated treatment session in order to assist patient in achieving goals to improve transfers and overall activity tolerance  Patient did not demonstrate progress toward achieving functional mobility goals this treatment session  Patient with reduced desire to participate and refusing OOB mobility  She required extended time for bed level care secondary to bowel and bladder incontinence x 2 during which she required assistance for bed mobility  She tolerated sitting x 5 minutes and 1 sit<->stand transfer with mod-AX1  PT d/c recommendation remains for return to facility with rehab services  Patient will continue to benefit from continued skilled PT this admission to achieve maximal function and safety  Co-treatment with occupational therapy as performed today  This patient's participation in therapy services was limited by decreased tolerance for activity and current medical status  For these reasons, co-treatment was performed so that patient could optimally participate in therapy services and maximize their rehabilitation during treatment time  PT and OT disciplines addressed separate goals of patient's individualized care plan  Goals   Patient Goals to be changed   STG Expiration Date 09/29/22   PT Treatment Day 1   Plan   Treatment/Interventions Functional transfer training;LE strengthening/ROM; Therapeutic exercise; Endurance training;Patient/family training;Equipment eval/education; Bed mobility;Gait training;OT;Spoke to nursing   Progress Slow progress, decreased activity tolerance   PT Frequency 2-3x/wk  (until patient demonstrates improved participation and activity tolerance)   Recommendation   PT Discharge Recommendation Return to facility with rehabilitation services   AM-PAC Basic Mobility Inpatient   Turning in Bed Without Bedrails 3   Lying on Back to Sitting on Edge of Flat Bed 2   Moving Bed to Chair 1   Standing Up From Chair 1   Walk in Room 1   Climb 3-5 Stairs 1   Basic Mobility Inpatient Raw Score 9   Highest Level Of Mobility   JH-HLM Goal 3: Sit at edge of bed   JH-HLM Achieved 3: Sit at edge of bed     The patient's AM-PAC Basic Mobility Inpatient Standardized Score is less than 42 9, suggesting this patient may benefit from discharge to post-acute rehabilitation services  Please also refer to the recommendation of the Physical Therapist for safe discharge planning      MALOU GARCIA PT, DPT

## 2022-09-21 NOTE — PLAN OF CARE
Problem: PHYSICAL THERAPY ADULT  Goal: Performs mobility at highest level of function for planned discharge setting  See evaluation for individualized goals  Description:    Equipment Recommended:  (RW)       See flowsheet documentation for full assessment, interventions and recommendations  Outcome: Not Progressing  Note: Prognosis: Fair  Problem List: Decreased strength, Decreased endurance, Impaired balance, Decreased mobility, Decreased skin integrity, Decreased safety awareness, Impaired judgement  Assessment: PT initiated treatment session in order to assist patient in achieving goals to improve transfers and overall activity tolerance  Patient did not demonstrate progress toward achieving functional mobility goals this treatment session  Patient with reduced desire to participate and refusing OOB mobility  She required extended time for bed level care secondary to bowel and bladder incontinence x 2 during which she required assistance for bed mobility  She tolerated sitting x 5 minutes and 1 sit<->stand transfer with mod-AX1  PT d/c recommendation remains for return to facility with rehab services  Patient will continue to benefit from continued skilled PT this admission to achieve maximal function and safety  Barriers to Discharge: Inaccessible home environment, Decreased caregiver support     PT Discharge Recommendation: Return to facility with rehabilitation services    See flowsheet documentation for full assessment

## 2022-09-21 NOTE — ASSESSMENT & PLAN NOTE
· Patient with history of gastric bypass surgery  Has had significant malnutrition issues and anastomotic ulcerations  Seen by Bariatric Service during recent hospitalization at [facility] who recommended prolonged course of TPN for nutritional optimization and possible revision of surgery in the future  · Was planned to have appt with bariatric sx on 8/20 however had to be rescheduled  · Given recurrent bacteremia; s/p GI consult; recommended discontinuation of TPN  S/p PEG, TF initiated  · P o   Diet for pleasure feeding resumed  · To f/u with bariatric surgery

## 2022-09-21 NOTE — OCCUPATIONAL THERAPY NOTE
Occupational Therapy Progress Note     Patient Name: Rico Wilder  NABJR'K Date: 9/21/2022  Problem List  Principal Problem:    Sepsis ([facility] [address] )  Active Problems:    Hyponatremia    H/O bariatric surgery - bypass    NO[facility] (acute [facility]) ([facility] )    Ambulatory dysfunction    Moderate protein-calorie malnutrition ([facility] )    Bacteremia    Acute on chronic anemia    Goals of care, counseling/discussion    Acute encephalopathy    Left upper quadrant abdominal pain    Transaminitis            09/21/22 1015   OT Last Visit   OT Visit Date 09/21/22   Note Type   Note Type Treatment   Restrictions/Precautions   Weight Bearing Precautions Per Order No   Other Precautions Contact/isolation; Fall Risk;Pain;Bed Alarm;Multiple lines  (c  diff, PEG tube)   Pain Assessment   Pain Assessment Tool [address] [place] [address] Pain Intervention(s) Repositioned; Emotional support   Pain Rating: FLACC (Rest) - Face 0   Pain Rating: FLACC (Rest) - Legs 0   Pain Rating: FLACC (Rest) - Activity 0   Pain Rating: FLACC (Rest) - Cry 0   Pain Rating: FLACC (Rest) - Consolability 0   Score: FLACC (Rest) 0   Pain Rating: FLACC (Activity) - Face 1   Pain Rating: FLACC (Activity) - Legs 0   Pain Rating: FLACC (Activity) - Activity 0   Pain Rating: FLACC (Activity) - Cry 0   Pain Rating: FLACC (Activity) - Consolability 0   Score: FLACC (Activity) 1   ADL   Where Assessed Supine, bed   Grooming Assistance 5  Supervision/Setup   Grooming Deficit Setup  (washed face)   UB Bathing Assistance 4  Minimal Assistance   UB Bathing Deficit Setup;Supervision/safety; Increased time to complete;Verbal cueing   UB Bathing Comments assist for abdomen and back, prompts for thoroughness   LB Bathing Assistance 2  Maximal Assistance   LB Bathing Deficit Perineal area; Buttocks;Right upper leg;Left upper leg;Right lower leg including foot; Left lower leg including foot;Setup;Supervision/safety; Increased time to complete   UB Dressing Assistance 4  Minimal Assistance   UB Dressing Deficit Setup; Increased time to complete;Pull around back   LB Dressing Assistance 2  Maximal Assistance   LB Dressing Deficit Don/doff R sock; Don/doff L sock; Thread LLE into underwear; Thread RLE into underwear;Pull up over hips  (helped bridge to pull up underwear)   Toileting Assistance  2  Maximal Assistance   Toileting Deficit Perineal hygiene   Toileting Comments found incontinent of bowel and bladder; able to initiate ace hygiene   Bed Mobility   Rolling R 4  Minimal assistance   Additional items Assist x 1   Rolling L 4  Minimal assistance   Additional items Assist x 1   Supine to Sit 3  Moderate assistance   Additional items Assist x 1   Sit to Supine 3  Moderate assistance   Additional items Assist x 1   Transfers   Sit to Stand 3  Moderate assistance   Additional items Assist x 1;Verbal cues   Stand to Sit 3  Moderate assistance   Additional items Assist x 1;Verbal cues   Cognition   Overall Cognitive Status Impaired   Arousal/Participation Alert   Attention Attends with cues to redirect   Orientation Level Oriented to person;Disoriented to situation   Memory Decreased short term memory;Decreased recall of recent events   Following Commands Follows one step commands with increased time or repetition   Comments pt requiring encouragement to participate and was particular about how things were done  See assessment for details   Activity Tolerance   Activity Tolerance Patient limited by fatigue;Patient limited by pain   Medical Staff Made Aware RN   Assessment   Assessment Patient participated in Skilled OT session this date with interventions consisting of  ADL re-training, bed mobility, functional transfers  Pt initially hesitant to participate and c/o multiple grievances including the food, cold coffee, wet bed sheets  Pt requiring MAX encouragement to perform self-care tasks independently   Pt was particular about how her linens were changed and provided detailed descriptions of how she normally does things  Pt required extensive time for ace hygiene and bed linen changes; able to initiate bridge position and rolled L/R with MIN A  Pt initially agreeable to sitting EOB - however then appeared confused/forgetful and asked "what are we doing?" Pt requiring MIN A for UB ADLs and MAX A for LB ADLs in supine with HOB elevated  Patient continues to be functioning below baseline level, occupational performance remains limited secondary to factors listed above and increased risk for falls and injury  From OT standpoint, recommendation at time of d/c would be [id number] with possibility of LTC  Patient to benefit from continued Occupational Therapy treatment while in the hospital to address deficits as defined above and maximize level of functional independence with ADLs and functional mobility  Plan   Treatment Interventions ADL retraining;Functional transfer training;Patient/family training; Activityengagement   Goal Expiration Date 09/27/22   OT Treatment Day 3   OT Frequency 2-3x/wk   Recommendation   OT Discharge Recommendation Post acute rehabilitation services   AM-PAC Daily Activity Inpatient   Lower Body Dressing 2   Bathing 2   Toileting 2   Upper Body Dressing 3   Grooming 3   Eating 3   Daily Activity Raw Score 15   Daily Activity Standardized Score (Calc for Raw Score >=11) 34 69   AM-[facility] Applied Cognition Inpatient   Following a Speech/Presentation 2   Understanding Ordinary Conversation 3   Taking Medications 2   Remembering Where Things Are Placed or Put Away 2   Remembering List of 4-5 Errands 1   Taking Care of Complicated Tasks 1   Applied Cognition Raw Score 11   Applied Cognition Standardized Score 27 03   Modified Teller Scale   Modified Teller Scale 4           The patient's raw score on the AM-PAC Daily Activity inpatient short form is 15, standardized score is 34 69, less than 39 4   Patients at this level are likely to benefit from discharge to post-acute rehabilitation services  Please refer to the recommendation of the Occupational Therapist for safe discharge planning            Neris Alba, OT

## 2022-09-21 NOTE — PLAN OF CARE
Problem: Potential for Falls  Goal: Patient will remain free of falls  Description: INTERVENTIONS:  - Educate patient/family on patient safety including physical limitations  - Instruct patient to call for assistance with activity   - Consult OT/PT to assist with strengthening/mobility   - Keep Call bell within reach  - Keep bed low and locked with side rails adjusted as appropriate  - Keep care items and personal belongings within reach  - Initiate and maintain comfort rounds  - Make Fall Risk Sign visible to staff  - Apply yellow socks and bracelet for high fall risk patients  - Consider moving patient to room near nurses station  Outcome: Progressing     Problem: MOBILITY - ADULT  Goal: Maintain or return to baseline ADL function  Description: INTERVENTIONS:  -  Assess patient's ability to carry out ADLs; assess patient's baseline for ADL function and identify physical deficits which impact ability to perform ADLs (bathing, care of mouth/teeth, toileting, grooming, dressing, etc )  - Assess/evaluate cause of self-care deficits   - Assess range of motion  - Assess patient's mobility; develop plan if impaired  - Assess patient's need for assistive devices and provide as appropriate  - Encourage maximum independence but intervene and supervise when necessary  - Involve family in performance of ADLs  - Assess for home care needs following discharge   - Consider OT consult to assist with ADL evaluation and planning for discharge  - Provide patient education as appropriate  Outcome: Progressing  Goal: Maintains/Returns to pre admission functional level  Description: INTERVENTIONS:  - Perform BMAT or MOVE assessment daily    - Set and communicate daily mobility goal to care team and patient/family/caregiver     - Out of bed for toileting  - Record patient progress and toleration of activity level   Outcome: Progressing     Problem: INFECTION - ADULT  Goal: Absence or prevention of progression during hospitalization  Description: INTERVENTIONS:  - Assess and monitor for signs and symptoms of infection  - Monitor lab/diagnostic results  - Monitor all insertion sites, i e  indwelling lines, tubes, and drains  - Monitor endotracheal if appropriate and nasal secretions for changes in amount and color  - [place] appropriate cooling/warming therapies per order  - Administer medications as ordered  - Instruct and encourage patient and family to use good hand hygiene technique  - Identify and instruct in appropriate isolation precautions for identified infection/condition  Outcome: Progressing  Goal: Absence of fever/infection during neutropenic period  Description: INTERVENTIONS:  - Monitor WBC    Outcome: Progressing     Problem: SAFETY ADULT  Goal: Patient will remain free of falls  Description: INTERVENTIONS:  - Educate patient/family on patient safety including physical limitations  - Instruct patient to call for assistance with activity   - Consult OT/PT to assist with strengthening/mobility   - Keep Call bell within reach  - Keep bed low and locked with side rails adjusted as appropriate  - Keep care items and personal belongings within reach  - Initiate and maintain comfort rounds  - Make Fall Risk Sign visible to staff  - Apply yellow socks and bracelet for high fall risk patients  - Consider moving patient to room near nurses station  Outcome: Progressing  Goal: Maintain or return to baseline ADL function  Description: INTERVENTIONS:  -  Assess patient's ability to carry out ADLs; assess patient's baseline for ADL function and identify physical deficits which impact ability to perform ADLs (bathing, care of mouth/teeth, toileting, grooming, dressing, etc )  - Assess/evaluate cause of self-care deficits   - Assess range of motion  - Assess patient's mobility; develop plan if impaired  - Assess patient's need for assistive devices and provide as appropriate  - Encourage maximum independence but intervene and supervise when necessary  - Involve family in performance of ADLs  - Assess for home care needs following discharge   - Consider OT consult to assist with ADL evaluation and planning for discharge  - Provide patient education as appropriate  Outcome: Progressing  Goal: Maintains/Returns to pre admission functional level  Description: INTERVENTIONS:  - Perform BMAT or MOVE assessment daily    - Set and communicate daily mobility goal to care team and patient/family/caregiver  - Collaborate with rehabilitation services on mobility goals if consulted  - Out of bed for toileting  - Record patient progress and toleration of activity level   Outcome: Progressing     Problem: DISCHARGE PLANNING  Goal: Discharge to home or other facility with appropriate resources  Description: INTERVENTIONS:  - Identify barriers to discharge w/patient and caregiver  - Arrange for needed discharge resources and transportation as appropriate  - Identify discharge learning needs (meds, wound care, etc )  - Arrange for interpretive services to assist at discharge as needed  - Refer to Case Management Department for coordinating discharge planning if the patient needs post-hospital services based on physician/advanced practitioner order or complex needs related to functional status, cognitive ability, or social support system  Outcome: Progressing     Problem: Knowledge Deficit  Goal: Patient/family/caregiver demonstrates understanding of disease process, treatment plan, medications, and discharge instructions  Description: Complete learning assessment and assess knowledge base    Interventions:  - Provide teaching at level of understanding  - Provide teaching via preferred learning methods  Outcome: Progressing     Problem: SKIN/TISSUE INTEGRITY - ADULT  Goal: Skin Integrity remains intact(Skin Breakdown Prevention)  Description: Assess:  -Inspect skin when repositioning, toileting, and assisting with ADLS  -Assess extremities for adequate circulation and sensation     Bed Management:  -Have minimal linens on bed & keep smooth, unwrinkled  -Change linens as needed when moist or perspiring    Toileting:  -Offer bedside commode      Activity:  -Encourage activity and walks on unit  -Encourage or provide ROM exercises     Skin Care:  -Avoid use of baby powder, tape, friction and shearing, hot water or constrictive clothing  -Do not massage red bony areas      Outcome: Progressing  Goal: Incision(s), wounds(s) or drain site(s) healing without S/S of infection  Description: INTERVENTIONS  - Assess and document dressing, incision, wound bed, drain sites and surrounding tissue  - Provide patient and family education  Outcome: Progressing  Goal: Pressure injury heals and does not worsen  Description: Interventions:  - Implement low air loss mattress or specialty surface (Criteria met)  - Apply silicone foam dressing  - Consider nutrition services referral as needed  Outcome: Progressing     Problem: Prexisting or High Potential for Compromised Skin Integrity  Goal: Skin integrity is maintained or improved  Description: INTERVENTIONS:  - Identify patients at risk for skin breakdown  - Assess and monitor skin integrity  - Assess and monitor nutrition and hydration status  - Monitor labs   - Assess for incontinence   - Turn and reposition patient  - Assist with mobility/ambulation  - Relieve pressure over bony prominences  - Avoid friction and shearing  - Provide appropriate hygiene as needed including keeping skin clean and dry  - Evaluate need for skin moisturizer/barrier cream  - Collaborate with interdisciplinary team   - Patient/family teaching  - Consider wound care consult   Outcome: Progressing     Problem: Nutrition/Hydration-ADULT  Goal: Nutrient/Hydration intake appropriate for improving, restoring or maintaining nutritional needs  Description: Monitor and assess patient's nutrition/hydration status for malnutrition   Collaborate with interdisciplinary team and initiate plan and interventions as ordered  Monitor patient's weight and dietary intake as ordered or per policy  Utilize nutrition screening tool and intervene as necessary  Determine patient's food preferences and provide high-protein, high-caloric foods as appropriate       INTERVENTIONS:  - Monitor oral intake, urinary output, labs, and treatment plans  - Assess nutrition and hydration status and recommend course of action  - Evaluate amount of meals eaten  - Assist patient with eating if necessary   - Allow adequate time for meals  - Recommend/ encourage appropriate diets, oral nutritional supplements, and vitamin/mineral supplements  - Order, calculate, and assess calorie counts as needed  - Recommend, monitor, and adjust tube feedings and TPN/PPN based on assessed needs  - Assess need for intravenous fluids  - Provide specific nutrition/hydration education as appropriate  - Include patient/family/caregiver in decisions related to nutrition  Outcome: Progressing

## 2022-09-22 NOTE — PLAN OF CARE
Problem: Potential for Falls  Goal: Patient will remain free of falls  Description: INTERVENTIONS:  - Educate patient/family on patient safety including physical limitations  - Instruct patient to call for assistance with activity   - Consult OT/PT to assist with strengthening/mobility   - Keep Call bell within reach  - Keep bed low and locked with side rails adjusted as appropriate  - Keep care items and personal belongings within reach  - Initiate and maintain comfort rounds  - Make Fall Risk Sign visible to staff  - Apply yellow socks and bracelet for high fall risk patients  - Consider moving patient to room near nurses station  Outcome: Progressing     Problem: MOBILITY - ADULT  Goal: Maintain or return to baseline ADL function  Description: INTERVENTIONS:  -  Assess patient's ability to carry out ADLs; assess patient's baseline for ADL function and identify physical deficits which impact ability to perform ADLs (bathing, care of mouth/teeth, toileting, grooming, dressing, etc )  - Assess/evaluate cause of self-care deficits   - Assess range of motion  - Assess patient's mobility; develop plan if impaired  - Assess patient's need for assistive devices and provide as appropriate  - Encourage maximum independence but intervene and supervise when necessary  - Involve family in performance of ADLs  - Assess for home care needs following discharge   - Consider OT consult to assist with ADL evaluation and planning for discharge  - Provide patient education as appropriate  Outcome: Progressing  Goal: Maintains/Returns to pre admission functional level  Description: INTERVENTIONS:  - Perform BMAT or MOVE assessment daily    - Set and communicate daily mobility goal to care team and patient/family/caregiver     - Collaborate with rehabilitation services on mobility goals if consulted  - Out of bed for toileting  - Record patient progress and toleration of activity level   Outcome: Progressing     Problem: INFECTION - ADULT  Goal: Absence or prevention of progression during hospitalization  Description: INTERVENTIONS:  - Assess and monitor for signs and symptoms of infection  - Monitor lab/diagnostic results  - Monitor all insertion sites, i e  indwelling lines, tubes, and drains  - Monitor endotracheal if appropriate and nasal secretions for changes in amount and color  - [place] appropriate cooling/warming therapies per order  - Administer medications as ordered  - Instruct and encourage patient and family to use good hand hygiene technique  - Identify and instruct in appropriate isolation precautions for identified infection/condition  Outcome: Progressing  Goal: Absence of fever/infection during neutropenic period  Description: INTERVENTIONS:  - Monitor WBC    Outcome: Progressing     Problem: SAFETY ADULT  Goal: Patient will remain free of falls  Description: INTERVENTIONS:  - Educate patient/family on patient safety including physical limitations  - Instruct patient to call for assistance with activity   - Consult OT/PT to assist with strengthening/mobility   - Keep Call bell within reach  - Keep bed low and locked with side rails adjusted as appropriate  - Keep care items and personal belongings within reach  - Initiate and maintain comfort rounds  - Make Fall Risk Sign visible to staff  - Apply yellow socks and bracelet for high fall risk patients  - Consider moving patient to room near nurses station  Outcome: Progressing  Goal: Maintain or return to baseline ADL function  Description: INTERVENTIONS:  -  Assess patient's ability to carry out ADLs; assess patient's baseline for ADL function and identify physical deficits which impact ability to perform ADLs (bathing, care of mouth/teeth, toileting, grooming, dressing, etc )  - Assess/evaluate cause of self-care deficits   - Assess range of motion  - Assess patient's mobility; develop plan if impaired  - Assess patient's need for assistive devices and provide as appropriate  - Encourage maximum independence but intervene and supervise when necessary  - Involve family in performance of ADLs  - Assess for home care needs following discharge   - Consider OT consult to assist with ADL evaluation and planning for discharge  - Provide patient education as appropriate  Outcome: Progressing  Goal: Maintains/Returns to pre admission functional level  Description: INTERVENTIONS:  - Perform BMAT or MOVE assessment daily    - Set and communicate daily mobility goal to care team and patient/family/caregiver  - Collaborate with rehabilitation services on mobility goals if consulted  - Out of bed for toileting  - Record patient progress and toleration of activity level   Outcome: Progressing     Problem: DISCHARGE PLANNING  Goal: Discharge to home or other facility with appropriate resources  Description: INTERVENTIONS:  - Identify barriers to discharge w/patient and caregiver  - Arrange for needed discharge resources and transportation as appropriate  - Identify discharge learning needs (meds, wound care, etc )  - Arrange for interpretive services to assist at discharge as needed  - Refer to Case Management Department for coordinating discharge planning if the patient needs post-hospital services based on physician/advanced practitioner order or complex needs related to functional status, cognitive ability, or social support system  Outcome: Progressing     Problem: Knowledge Deficit  Goal: Patient/family/caregiver demonstrates understanding of disease process, treatment plan, medications, and discharge instructions  Description: Complete learning assessment and assess knowledge base    Interventions:  - Provide teaching at level of understanding  - Provide teaching via preferred learning methods  Outcome: Progressing     Problem: SKIN/TISSUE INTEGRITY - ADULT  Goal: Skin Integrity remains intact(Skin Breakdown Prevention)  Description: Assess:  -Inspect skin when repositioning, toileting, and assisting with ADLS  -Assess extremities for adequate circulation and sensation     Bed Management:  -Have minimal linens on bed & keep smooth, unwrinkled  -Change linens as needed when moist or perspiring    Toileting:  -Offer bedside commode      Activity:  -Encourage activity and walks on unit  -Encourage or provide ROM exercises   -Use appropriate equipment to lift or move patient in bed      Skin Care:  -Avoid use of baby powder, tape, friction and shearing, hot water or constrictive clothing  -Do not massage red bony areas      Outcome: Progressing  Goal: Incision(s), wounds(s) or drain site(s) healing without S/S of infection  Description: INTERVENTIONS  - Assess and document dressing, incision, wound bed, drain sites and surrounding tissue  - Provide patient and family education    Outcome: Progressing  Goal: Pressure injury heals and does not worsen  Description: Interventions:  - Implement low air loss mattress or specialty surface (Criteria met)  - Apply silicone foam dressing  - Apply fecal or urinary incontinence containment device   - Utilize friction reducing device or surface for transfers   - Consider nutrition services referral as needed  Outcome: Progressing     Problem: Prexisting or High Potential for Compromised Skin Integrity  Goal: Skin integrity is maintained or improved  Description: INTERVENTIONS:  - Identify patients at risk for skin breakdown  - Assess and monitor skin integrity  - Assess and monitor nutrition and hydration status  - Monitor labs   - Assess for incontinence   - Turn and reposition patient  - Assist with mobility/ambulation  - Relieve pressure over bony prominences  - Avoid friction and shearing  - Provide appropriate hygiene as needed including keeping skin clean and dry  - Evaluate need for skin moisturizer/barrier cream  - Collaborate with interdisciplinary team   - Patient/family teaching  - Consider wound care consult   Outcome: Progressing Problem: Nutrition/Hydration-ADULT  Goal: Nutrient/Hydration intake appropriate for improving, restoring or maintaining nutritional needs  Description: Monitor and assess patient's nutrition/hydration status for malnutrition  Collaborate with interdisciplinary team and initiate plan and interventions as ordered  Monitor patient's weight and dietary intake as ordered or per policy  Utilize nutrition screening tool and intervene as necessary  Determine patient's food preferences and provide high-protein, high-caloric foods as appropriate       INTERVENTIONS:  - Monitor oral intake, urinary output, labs, and treatment plans  - Assess nutrition and hydration status and recommend course of action  - Evaluate amount of meals eaten  - Assist patient with eating if necessary   - Allow adequate time for meals  - Recommend/ encourage appropriate diets, oral nutritional supplements, and vitamin/mineral supplements  - Order, calculate, and assess calorie counts as needed  - Recommend, monitor, and adjust tube feedings and TPN/PPN based on assessed needs  - Assess need for intravenous fluids  - Provide specific nutrition/hydration education as appropriate  - Include patient/family/caregiver in decisions related to nutrition  Outcome: Progressing

## 2022-09-22 NOTE — ASSESSMENT & PLAN NOTE
· Patient with history of gastric bypass surgery  Has had significant malnutrition issues and anastomotic ulcerations  Seen by Bariatric Service during recent hospitalization at [facility] who recommended prolonged course of TPN for nutritional optimization and possible revision of surgery in the future  · Was planned to have appt with bariatric sx on 8/20 however had to be rescheduled  · Given recurrent bacteremia; s/p GI consult; recommended discontinuation of TPN  S/p PEG, TF initiated- nutrition adjusted rate of TF  · P o   Diet for pleasure feeding resumed  · To f/u with bariatric surgery

## 2022-09-22 NOTE — PLAN OF CARE
Problem: Potential for Falls  Goal: Patient will remain free of falls  Description: INTERVENTIONS:  - Educate patient/family on patient safety including physical limitations  - Instruct patient to call for assistance with activity   - Consult OT/PT to assist with strengthening/mobility   - Keep Call bell within reach  - Keep bed low and locked with side rails adjusted as appropriate  - Keep care items and personal belongings within reach  - Initiate and maintain comfort rounds  - Make Fall Risk Sign visible to staff  - Offer Toileting every    Hours, in advance of need  - Initiate/Maintain     alarm  - Obtain necessary fall risk management equipment:     - Apply yellow socks and bracelet for high fall risk patients  - Consider moving patient to room near nurses station  Outcome: Progressing     Problem: MOBILITY - ADULT  Goal: Maintain or return to baseline ADL function  Description: INTERVENTIONS:  -  Assess patient's ability to carry out ADLs; assess patient's baseline for ADL function and identify physical deficits which impact ability to perform ADLs (bathing, care of mouth/teeth, toileting, grooming, dressing, etc )  - Assess/evaluate cause of self-care deficits   - Assess range of motion  - Assess patient's mobility; develop plan if impaired  - Assess patient's need for assistive devices and provide as appropriate  - Encourage maximum independence but intervene and supervise when necessary  - Involve family in performance of ADLs  - Assess for home care needs following discharge   - Consider OT consult to assist with ADL evaluation and planning for discharge  - Provide patient education as appropriate  Outcome: Progressing  Goal: Maintains/Returns to pre admission functional level  Description: INTERVENTIONS:  - Perform BMAT or MOVE assessment daily    - Set and communicate daily mobility goal to care team and patient/family/caregiver     - Collaborate with rehabilitation services on mobility goals if consulted  - Perform Range of Motion    times a day  - Reposition patient every    hours    - Dangle patient      times a day  - Stand patient    times a day  - Ambulate patient    times a day  - Out of bed to chair    times a day   - Out of bed for meals    times a day  - Out of bed for toileting  - Record patient progress and toleration of activity level   Outcome: Progressing     Problem: INFECTION - ADULT  Goal: Absence or prevention of progression during hospitalization  Description: INTERVENTIONS:  - Assess and monitor for signs and symptoms of infection  - Monitor lab/diagnostic results  - Monitor all insertion sites, i e  indwelling lines, tubes, and drains  - Monitor endotracheal if appropriate and nasal secretions for changes in amount and color  - [place] appropriate cooling/warming therapies per order  - Administer medications as ordered  - Instruct and encourage patient and family to use good hand hygiene technique  - Identify and instruct in appropriate isolation precautions for identified infection/condition  Outcome: Progressing  Goal: Absence of fever/infection during neutropenic period  Description: INTERVENTIONS:  - Monitor WBC    Outcome: Progressing     Problem: SAFETY ADULT  Goal: Patient will remain free of falls  Description: INTERVENTIONS:  - Educate patient/family on patient safety including physical limitations  - Instruct patient to call for assistance with activity   - Consult OT/PT to assist with strengthening/mobility   - Keep Call bell within reach  - Keep bed low and locked with side rails adjusted as appropriate  - Keep care items and personal belongings within reach  - Initiate and maintain comfort rounds  - Make Fall Risk Sign visible to staff  - Offer Toileting every    Hours, in advance of need  - Initiate/Maintain   alarm  - Obtain necessary fall risk management equipment:       - Apply yellow socks and bracelet for high fall risk patients  - Consider moving patient to room near nurses station  Outcome: Progressing  Goal: Maintain or return to baseline ADL function  Description: INTERVENTIONS:  -  Assess patient's ability to carry out ADLs; assess patient's baseline for ADL function and identify physical deficits which impact ability to perform ADLs (bathing, care of mouth/teeth, toileting, grooming, dressing, etc )  - Assess/evaluate cause of self-care deficits   - Assess range of motion  - Assess patient's mobility; develop plan if impaired  - Assess patient's need for assistive devices and provide as appropriate  - Encourage maximum independence but intervene and supervise when necessary  - Involve family in performance of ADLs  - Assess for home care needs following discharge   - Consider OT consult to assist with ADL evaluation and planning for discharge  - Provide patient education as appropriate  Outcome: Progressing  Goal: Maintains/Returns to pre admission functional level  Description: INTERVENTIONS:  - Perform BMAT or MOVE assessment daily    - Set and communicate daily mobility goal to care team and patient/family/caregiver  - Collaborate with rehabilitation services on mobility goals if consulted  - Perform Range of Motion    times a day  - Reposition patient every      hours    - Dangle patient times a day  - Stand patient    times a day  - Ambulate patient    times a day  - Out of bed to chair    times a day   - Out of bed for meals      times a day  - Out of bed for toileting  - Record patient progress and toleration of activity level   Outcome: Progressing     Problem: DISCHARGE PLANNING  Goal: Discharge to home or other facility with appropriate resources  Description: INTERVENTIONS:  - Identify barriers to discharge w/patient and caregiver  - Arrange for needed discharge resources and transportation as appropriate  - Identify discharge learning needs (meds, wound care, etc )  - Arrange for interpretive services to assist at discharge as needed  - Refer to Case Management Department for coordinating discharge planning if the patient needs post-hospital services based on physician/advanced practitioner order or complex needs related to functional status, cognitive ability, or social support system  Outcome: Progressing     Problem: Knowledge Deficit  Goal: Patient/family/caregiver demonstrates understanding of disease process, treatment plan, medications, and discharge instructions  Description: Complete learning assessment and assess knowledge base    Interventions:  - Provide teaching at level of understanding  - Provide teaching via preferred learning methods  Outcome: Progressing     Problem: SKIN/TISSUE INTEGRITY - ADULT  Goal: Skin Integrity remains intact(Skin Breakdown Prevention)  Description: Assess:  -Perform Gabe assessment every       -Clean and moisturize skin every       -Inspect skin when repositioning, toileting, and assisting with ADLS  -Assess under medical devices such as    every     -Assess extremities for adequate circulation and sensation     Bed Management:  -Have minimal linens on bed & keep smooth, unwrinkled  -Change linens as needed when moist or perspiring  -Avoid sitting or lying in one position for more than      hours while in bed  -Keep HOB at   degrees     Toileting:  -Offer bedside commode  -Assess for incontinence every       -Use incontinent care products after each incontinent episode such as       Activity:  -Mobilize patient    times a day  -Encourage activity and walks on unit  -Encourage or provide ROM exercises   -Turn and reposition patient every    Hours  -Use appropriate equipment to lift or move patient in bed  -Instruct/ Assist with weight shifting every    when out of bed in chair  -Consider limitation of chair time    hour intervals    Skin Care:  -Avoid use of baby powder, tape, friction and shearing, hot water or constrictive clothing  -Relieve pressure over bony prominences using     -Do not massage red bony areas    Next Steps:  -Teach patient strategies to minimize risks such as      -Consider consults to  interdisciplinary teams such as       Outcome: Progressing  Goal: Incision(s), wounds(s) or drain site(s) healing without S/S of infection  Description: INTERVENTIONS  - Assess and document dressing, incision, wound bed, drain sites and surrounding tissue  - Provide patient and family education  - Perform skin care/dressing changes every     Outcome: Progressing  Goal: Pressure injury heals and does not worsen  Description: Interventions:  - Implement low air loss mattress or specialty surface (Criteria met)  - Apply silicone foam dressing  - Instruct/assist with weight shifting every    minutes when in chair   - Limit chair time to      hour intervals  - Use special pressure reducing interventions such as    when in chair   - Apply fecal or urinary incontinence containment device   - Perform passive or active ROM every     - Turn and reposition patient & offload bony prominences every    hours   - Utilize friction reducing device or surface for transfers   - Consider consults to  interdisciplinary teams such as     - Use incontinent care products after each incontinent episode such as       - Consider nutrition services referral as needed  Outcome: Progressing     Problem: Prexisting or High Potential for Compromised Skin Integrity  Goal: Skin integrity is maintained or improved  Description: INTERVENTIONS:  - Identify patients at risk for skin breakdown  - Assess and monitor skin integrity  - Assess and monitor nutrition and hydration status  - Monitor labs   - Assess for incontinence   - Turn and reposition patient  - Assist with mobility/ambulation  - Relieve pressure over bony prominences  - Avoid friction and shearing  - Provide appropriate hygiene as needed including keeping skin clean and dry  - Evaluate need for skin moisturizer/barrier cream  - Collaborate with interdisciplinary team   - Patient/family teaching  - Consider wound care consult   Outcome: Progressing     Problem: Nutrition/Hydration-ADULT  Goal: Nutrient/Hydration intake appropriate for improving, restoring or maintaining nutritional needs  Description: Monitor and assess patient's nutrition/hydration status for malnutrition  Collaborate with interdisciplinary team and initiate plan and interventions as ordered  Monitor patient's weight and dietary intake as ordered or per policy  Utilize nutrition screening tool and intervene as necessary  Determine patient's food preferences and provide high-protein, high-caloric foods as appropriate       INTERVENTIONS:  - Monitor oral intake, urinary output, labs, and treatment plans  - Assess nutrition and hydration status and recommend course of action  - Evaluate amount of meals eaten  - Assist patient with eating if necessary   - Allow adequate time for meals  - Recommend/ encourage appropriate diets, oral nutritional supplements, and vitamin/mineral supplements  - Order, calculate, and assess calorie counts as needed  - Recommend, monitor, and adjust tube feedings and TPN/PPN based on assessed needs  - Assess need for intravenous fluids  - Provide specific nutrition/hydration education as appropriate  - Include patient/family/caregiver in decisions related to nutrition  Outcome: Progressing

## 2022-09-22 NOTE — NUTRITION
09/22/22 4742   Recommendations/Interventions   Summary Patient's appetite remains poor on regular diet  Current cyclic EN providing 12% of nutritional needs  Recent weight loss noted  Consider increasing volume of EN to meet 75% of nutritional needs  Interventions/Recommendations Continue current diet order;Adjust EN/ PN;Lab consider order (Specify); Initiate daily weight   Intervention Comments Adjusted cyclic EN to: Jevity 1 2 kcal @ 65 ml/hr x16 hours with 50 ml free water flush every 6 hours (1248 kcal, 57 gms pro, 1039 ml tv)  Remainder of nutritional needs to be met with po intake

## 2022-09-22 NOTE — ASSESSMENT & PLAN NOTE
· Palliative on board  Family meeting held with prior provider:  patient has had a change of heart and wants to be txt focused and proceed with PEG placement  She states that she realized that she is not ready to die  · 9/15:  Lesvia Cho discussion held with patient regarding likely outcomes of either cardiac arrest or respiratory failure requiring intubation given her current clinical state    Patient states she wishes to be DNR DNI with aggressive care

## 2022-09-22 NOTE — ASSESSMENT & PLAN NOTE
Cr renal function remains impaired but stable  Suspect volume depletion as main etiology,   Start gentle IV hydration - now creatinine trending down- dc ivf- adjust tube flushes  Avoid all possible nephrotoxins  (0) independent

## 2022-09-22 NOTE — ASSESSMENT & PLAN NOTE
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

## 2022-09-22 NOTE — ASSESSMENT & PLAN NOTE
· Exacerbated by diarrhea/dehydration however patient is chronically malnourished     · S/p PT/OT evaluation; to return to SNF  · Encouraged participation with PT/OT  · initate dc plans- wesley PARKER

## 2022-09-22 NOTE — PROGRESS NOTES
[facility] [facility]  Progress Note - Shelia Leo 1945, 76 y o  female MRN: [id number]  Unit/Bed#: [facility] 317-01 Encounter: [id number]  Primary Care Provider: Marisela Rabago MD   Date and time admitted to hospital: 8/30/2022 11:49 PM    * Sepsis ([address] )  Assessment & Plan  · Resolved  · WBC Of 12 5 after PEG feed was started but no fever, chills, tachycardia or tachypnea   · Initial source was bacteremia, C diff colitis  ID was consulted  · + evidence of colitis on ct scan  Status post C diff panel; positive toxin PCR but negative EIA  On p o  Vancomycin through 9/18  Diarrhea resolved  · Positive recurrent bacteremia  Presumed secondary to PICC line with use of TPN  Prior history of recent MSSE  · Repeat blood cx neg after 5 days  · Discontinued presenting PICC  New PICC ordered (9/9) which was dc'ed after abx iv daptomycin completed  · WBC normalized       Bacteremia  Assessment & Plan  · Recent hx of staph epi bacteremia in 7/2022, presumed due to picc line  ·  recurrence 1/2 set staph epi, 2nd set no growth thus far  · Had been on IV vancomycin but discontinued due to fluctuating levels  Completed vancomycin to daptomycin courses 9/15  · S/p TAVIA (9/9) no evidence of vegetation  · Repeat blood cx neg x 5 days   · TPN discontinued  S/p PEG placement  · PICC line discontinued      H/O bariatric surgery - bypass  Assessment & Plan  · Patient with history of gastric bypass surgery  Has had significant malnutrition issues and anastomotic ulcerations  Seen by Bariatric Service during recent hospitalization at [facility] who recommended prolonged course of TPN for nutritional optimization and possible revision of surgery in the future  · Was planned to have appt with bariatric sx on 8/20 however had to be rescheduled  · Given recurrent bacteremia; s/p GI consult; recommended discontinuation of TPN  S/p PEG, TF initiated- nutrition adjusted rate of TF  · P o   Diet for pleasure feeding resumed  · To f/u with bariatric surgery      Transaminitis  Assessment & Plan  · NO RQU pain, USG with gallstones, no choelcystitis  · Trend LFTs- trending down today    Left upper quadrant abdominal pain  Assessment & Plan  Likely secondary to severe constipation, patient with small bowel movements today with significant improvement in pain  KUB suggestive of large volume stool burden,  Pain has subsided- ultrasound showed cholelithiasis without cholecystitis    Acute encephalopathy  Assessment & Plan  · Resolved  · Secondary to hospital induced delirium vs metabolic encephalopathy  · No focal deficit on exam  · Delirium precautions      Goals of care, counseling/discussion  Assessment & Plan  · Palliative on board  Family meeting held with prior provider:  patient has had a change of heart and wants to be txt focused and proceed with PEG placement  She states that she realized that she is not ready to die  · 9/15:  Óscar Askew discussion held with patient regarding likely outcomes of either cardiac arrest or respiratory failure requiring intubation given her current clinical state    Patient states she wishes to be DNR DNI with aggressive care      Acute on chronic anemia  Assessment & Plan  · Evaluated by GI prior  · 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  · S/p recent EGD in 7/22 revealing: Residual marginal ulcer of the gastrojejunostomy anastomosis with improved size  · S/p EGD 9/2 revealing: Large, cratered ulcer in the gastrojejunal anastomosis   · Received PRBC transfusion x 1 this admission,  continue monitoring transfuse less than 7  · Cont PPI, BID, carafate    Moderate protein-calorie malnutrition ([address] )  Assessment & Plan  Malnutrition Findings:   Adult Malnutrition type: Chronic illness  Adult Degree of Malnutrition: Other severe protein calorie malnutrition  Malnutrition Characteristics: Weight loss, Muscle loss     On Jevity 1 2 - resume at lower max rate   Checked CMP and it shows hyponatremia of 130, low albumin but mildly elevated AST and A phos    360 Statement: Severe/Chronic malnutrition r/t condition, as evidenced by 4 8% wt loss x < 1 week (9/2/22: 125#, 9/5/22: 119#), and severe muscle mass depletion (temples/clavicle)  Treated with liberalized diet and nutrition supplements, possibly nutrition support pending goals of care    BMI Findings: Body mass index is 18 28 kg/m²  Hx of Savage-en Y gastric bypass, 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  Now off due to recurrent bacteremia  S/p PEG placement and on PO feeds  Cont nutritional supplements    Ambulatory dysfunction  Assessment & Plan  · Exacerbated by diarrhea/dehydration however patient is chronically malnourished  · S/p PT/OT evaluation; to return to SNF  · Encouraged participation with PT/OT  · initate dc plans- dw CM    NOAH (acute kidney injury) ([facility] [address] )  Assessment & Plan  Cr renal function remains impaired but stable  Suspect volume depletion as main etiology,   Start gentle IV hydration - now creatinine trending down- dc ivf- adjust tube flushes  Avoid all possible nephrotoxins  Hyponatremia  Assessment & Plan  Na at 132- no AMS  Adjust TF and po feeds          VTE Pharmacologic Prophylaxis: VTE Score: 3 Moderate Risk (Score 3-4) - Pharmacological DVT Prophylaxis Ordered: heparin  Patient Centered Rounds: I performed bedside rounds with nursing staff today  Discussions with Specialists or Other Care Team Provider:     Education and Discussions with Family / Patient: Updated  (sister) via phone  Time Spent for Care: 20 minutes  More than 50% of total time spent on counseling and coordination of care as described above      Current Length of Stay: 22 day(s)  Current Patient Status: Inpatient   Certification Statement: The patient will continue to require additional inpatient hospital stay due to placement  Discharge Plan: Anticipate discharge in 24-48 hrs to discharge location to be determined pending rehab evaluations  Code Status: Level 3 - DNAR and DNI    Subjective:   Resting in bed, she seems quite frustrated when I requested her to participate with PT- says im weak, denies any CP,N,V, she is irritated with the peg tube- explained to her she needs it fo rnutritional purpose    Objective:     Vitals:   Temp (24hrs), Av °F (36 7 °C), Min:98 °F (36 7 °C), Max:98 °F (36 7 °C)    Temp:  [98 °F (36 7 °C)] 98 °F (36 7 °C)  HR:  [89] 89  BP: (102)/(59) 102/59  SpO2:  [93 %] 93 %  Body mass index is 18 28 kg/m²  Input and Output Summary (last 24 hours): Intake/Output Summary (Last 24 hours) at 2022 1843  Last data filed at 2022 1230  Gross per 24 hour   Intake 1139 17 ml   Output 1000 ml   Net 139 17 ml       Physical Exam:   Physical Exam  Vitals and nursing note reviewed  Constitutional:       General: She is not in acute distress  Appearance: She is well-developed  Comments: Cachectic, elderly   HENT:      Head: Normocephalic and atraumatic  Mouth/Throat:      Mouth: Mucous membranes are moist    Eyes:      Conjunctiva/sclera: Conjunctivae normal    Cardiovascular:      Rate and Rhythm: Normal rate and regular rhythm  Heart sounds: No murmur heard  Pulmonary:      Effort: Pulmonary effort is normal  No respiratory distress  Breath sounds: Normal breath sounds  Abdominal:      General: Bowel sounds are normal       Palpations: Abdomen is soft  Tenderness: There is no abdominal tenderness  Comments: Peg in place   Musculoskeletal:         General: Normal range of motion  Cervical back: Neck supple  Comments: Muscle wasting   Skin:     General: Skin is warm and dry  Neurological:      General: No focal deficit present  Mental Status: She is alert  Mental status is at baseline     Psychiatric:      Comments: Irritable today          Additional Data:     Labs:  Results from last 7 days   Lab Units 22  1218   WBC Thousand/uL 9 06   HEMOGLOBIN g/dL 7 3*   HEMATOCRIT % 23 6*   PLATELETS Thousands/uL 552*   NEUTROS PCT % 56   LYMPHS PCT % 22   MONOS PCT % 11   EOS PCT % 10*     Results from last 7 days   Lab Units 09/22/22  1218   SODIUM mmol/L 132*   POTASSIUM mmol/L 4 8   CHLORIDE mmol/L 101   CO2 mmol/L 26   BUN mg/dL 24   CREATININE mg/dL 1 37*   ANION GAP mmol/L 5   CALCIUM mg/dL 8 5   ALBUMIN g/dL 1 8*   TOTAL BILIRUBIN mg/dL 0 23   ALK PHOS U/L 177*   ALT U/L 39   AST U/L 41   GLUCOSE RANDOM mg/dL 86         Results from last 7 days   Lab Units 09/22/22  1325 09/22/22  0541 09/22/22  0108 09/21/22  1815 09/21/22  1147 09/21/22  1145 09/21/22  0702 09/20/22  2335 09/20/22  2044 09/20/22  1143 09/20/22  0625 09/20/22  0050   POC GLUCOSE mg/dl 85 124 123 97 69 70 109 124 93 109 125 129               Lines/Drains:  Invasive Devices  Report    Peripheral Intravenous Line  Duration           Peripheral IV 09/19/22 Right Antecubital 3 days          Drain  Duration           External Urinary Catheter 47 days    Gastrostomy/Enterostomy Percutaneous Endoscopic Gastrostomy (PEG) 20 Fr  LUQ 14 days                      Imaging: Reviewed radiology reports from this admission including: ultrasound(s)    Recent Cultures (last 7 days):         Last 24 Hours Medication List:   Current Facility-Administered Medications   Medication Dose Route Frequency Provider Last Rate   • acetaminophen  650 mg Oral Q6H PRN Jovita Mcdermott, DO     • ALPRAZolam  0 25 mg Oral TID PRN Melissa Rajput DO     • alteplase  2 mg Intracatheter Once Checo Palumbo PA-C     • bisacodyl  10 mg Rectal Daily PRN Justine Cardenas MD     • clonazePAM  3 mg Oral HS Jovitakash Mcdermott, DO     • folic acid  1 mg Oral Daily Jovitakash Mcdermott, DO     • heparin (porcine)  5,000 Units Subcutaneous Q8H [address] Kaylan Miriamsandra Arroyo, DO     • levothyroxine  112 mcg Oral Early Morning Jovitakash Mcdermott, DO     • magnesium hydroxide  30 mL Oral Daily PRN Justine Cardenas MD     • metoclopramide  10 mg Intravenous Q6H PRN Mika Renner MD     • midodrine  5 mg Oral TID AC Jovita Mcdermott DO     • ondansetron  4 mg Intravenous Q6H PRN Deja Barcenas DO     • oxyCODONE  5 mg Oral Q6H Mika Renner MD     • pantoprazole  40 mg Oral BID AC Kaylan Arroyo DO     • polyethylene glycol  17 g Oral Daily Kurt Kayser, MD     • senna  2 tablet Oral HS Kurt Kayser, MD     • sucralfate  1 g Oral 4x Daily (AC & HS) Yung Grace DO     • thiamine  100 mg Oral Daily Jovita Mcdermott, DO     • traZODone  150 mg Oral HS Deja Barcenas DO          Today, Patient Was Seen By: Samreen Elias MD    **Please Note: This note may have been constructed using a voice recognition system  **

## 2022-09-22 NOTE — ASSESSMENT & PLAN NOTE
Malnutrition Findings:   Adult Malnutrition type: Chronic illness  Adult Degree of Malnutrition: Other severe protein calorie malnutrition  Malnutrition Characteristics: Weight loss, Muscle loss     On Jevity 1 2 - resume at lower max rate   Checked CMP and it shows hyponatremia of 130, low albumin but mildly elevated AST and A phos    360 Statement: Severe/Chronic malnutrition r/t condition, as evidenced by 4 8% wt loss x < 1 week (9/2/22: 125#, 9/5/22: 119#), and severe muscle mass depletion (temples/clavicle)  Treated with liberalized diet and nutrition supplements, possibly nutrition support pending goals of care    BMI Findings: Body mass index is 18 28 kg/m²  Hx of Savage-en Y gastric bypass, 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

## 2022-09-22 NOTE — ASSESSMENT & PLAN NOTE
Likely secondary to severe constipation, patient with small bowel movements today with significant improvement in pain  KUB suggestive of large volume stool burden,  Pain has subsided- ultrasound showed cholelithiasis without cholecystitis Patient's mom, Uriah states patient was referred for eczema and did not schedule due to COVID. Wondering if patient can be scheduled, preferably as a video visit.    Please call Uriah.

## 2022-09-23 NOTE — PLAN OF CARE
Problem: INFECTION - ADULT  Goal: Absence or prevention of progression during hospitalization  Description: INTERVENTIONS:  - Assess and monitor for signs and symptoms of infection  - Monitor lab/diagnostic results  - Monitor all insertion sites, i e  indwelling lines, tubes, and drains  - Monitor endotracheal if appropriate and nasal secretions for changes in amount and color  - [place] appropriate cooling/warming therapies per order  - Administer medications as ordered  - Instruct and encourage patient and family to use good hand hygiene technique  - Identify and instruct in appropriate isolation precautions for identified infection/condition  Outcome: Progressing  Goal: Absence of fever/infection during neutropenic period  Description: INTERVENTIONS:  - Monitor WBC    Outcome: Progressing     Problem: DISCHARGE PLANNING  Goal: Discharge to home or other facility with appropriate resources  Description: INTERVENTIONS:  - Identify barriers to discharge w/patient and caregiver  - Arrange for needed discharge resources and transportation as appropriate  - Identify discharge learning needs (meds, wound care, etc )  - Arrange for interpretive services to assist at discharge as needed  - Refer to Case Management Department for coordinating discharge planning if the patient needs post-hospital services based on physician/advanced practitioner order or complex needs related to functional status, cognitive ability, or social support system  Outcome: Progressing     Problem: Nutrition/Hydration-ADULT  Goal: Nutrient/Hydration intake appropriate for improving, restoring or maintaining nutritional needs  Description: Monitor and assess patient's nutrition/hydration status for malnutrition  Collaborate with interdisciplinary team and initiate plan and interventions as ordered  Monitor patient's weight and dietary intake as ordered or per policy  Utilize nutrition screening tool and intervene as necessary   Determine patient's food preferences and provide high-protein, high-caloric foods as appropriate       INTERVENTIONS:  - Monitor oral intake, urinary output, labs, and treatment plans  - Assess nutrition and hydration status and recommend course of action  - Evaluate amount of meals eaten  - Assist patient with eating if necessary   - Allow adequate time for meals  - Recommend/ encourage appropriate diets, oral nutritional supplements, and vitamin/mineral supplements  - Order, calculate, and assess calorie counts as needed  - Recommend, monitor, and adjust tube feedings and TPN/PPN based on assessed needs  - Assess need for intravenous fluids  - Provide specific nutrition/hydration education as appropriate  - Include patient/family/caregiver in decisions related to nutrition  Outcome: Progressing

## 2022-09-23 NOTE — PROGRESS NOTES
Progress Note - Infectious Disease   Arcadio Williamson 76 y o  female MRN: [id number]  Unit/Bed#: [facility] 317-01 Encounter: [id number]      Impression/Plan:  1  SIRS syndrome and possible sepsis, recurrent  Patient today developed isolated fever along with elevation in white count  Noted to have eosinophils on differential   Exam largely nonfocal other than reported dysuria  Blood cultures pending  Urine cultures pending  CT of the abdomen with questionable changes at the gallbladder the patient without localizing pain  Large stool burden noted  Patient has required recent PICC has limited mobility baseline  Consider urinary tract infection, occult bacteremia, DVTs, or possibly medication allergy/drug fever with eosinophilia  Will start patient on ceftriaxone given 2  Will start oral vancomycin prophylaxis given 4  Continue to trend fever curve/vitals  Repeat CBC/chemistry tomorrow  Follow-up pending blood culture  Follow-up pending urine culture  Monitor urinary symptoms  Monitor abdominal  Monitor skin exam/peripheral IV sites  Recommend bowel regimen  Recommend detailed review of medication  Recommend upper extremity and lower extremity Dopplers  Consider GI evaluation, with ongoing discomfort at PEG tube site  Additional supportive care as per primary  Additional interventions pending clinical course    2  Abnormal UA and dysuria  UA with some pyuria and patient reporting dysuria  Prior urine culture with mixed growth  Antibiotic as above for now  Monitor for ongoing symptoms  Continue to trend fever curve/WBC  Follow-up pending cultures    3  Abnormal abdominal imaging  Patient noted to have some abnormal changes to the gallbladder on imaging  Recent ultrasound unremarkable  Alkaline phosphatase borderline  Patient only localizes discomfort near the PEG tube  Monitor abdominal exam  Low threshold for repeat imaging  Antibiotic as above  Continue to trend fever curve/WBC    4   Recent CoNS bacteremia and possible C diff  Patient recently completed treatment for bacteremia as well as C diff  Restarting empiric antibiotic as above  C diff prophylaxis as above  Follow-up pending blood cultures  Monitor IV site    5  Progressing anemia and recent GI bleed  Hemoglobin relatively stable  Patient denies any reports of significant diarrhea or bloody stool  Continue to monitor  6  Elevated serum creatinine  Patient's creatinine has essentially remained elevated since earlier in admission  Uncertain if ongoing drug reaction as above  Could also be intake related  Consider Nephrology evaluation  Fluid hydration as per primary  Continue enteral feeding  Consider review of medications    Above plan discussed in detail with patient and with primary service  ID consult service will continue to follow  Antibiotics:  None    Recent events:  Patient was last seen by our service on 09/15  Notes reviewed along with wound care evaluations  It seems overall patient's primary complaints continued around her abdomen particularly in the left upper quadrant surrounding her PEG tube  No other acute events were noted  She was noted to have isolated fever today and elevation in white count to 14 2  Blood cultures are pending  Urine culture pending  Right upper quadrant ultrasound noted with cholelithiasis but no acute cholecystitis  Chest x-ray with some interstitial markings  CT of the abdomen with significant colonic stool burden and then some cholelithiasis with gallbladder luminal distension  Blood pressure borderline  Tachycardia noted  Creatinine remains mildly elevated  Mild elevation in alkaline phosphatase  Labs with eosinophilia  We are being reconsulted for assistance with management given patient's fever  Subjective:  Patient seen at bedside and she denied having any nausea, vomiting, chest pain  She is reporting some dysuria now which she did mention earlier in admission    Reports some waxing and waning nature  Only localizes abdominal discomfort around her PEG tube  Otherwise offers no other complaints  Objective:  Vitals:  Temp:  [98 °F (36 7 °C)-102 4 °F (39 1 °C)] 99 2 °F (37 3 °C)  HR:  [] 87  Resp:  [16-17] 17  BP: ()/(34-64) 84/34  SpO2:  [92 %-94 %] 92 %  Temp (24hrs), Av 7 °F (37 6 °C), Min:98 °F (36 7 °C), Max:102 4 °F (39 1 °C)  Current: Temperature: 99 2 °F (37 3 °C)    Physical Exam:   General Appearance:  Alert, interactive, nontoxic, no acute distress  Chronically ill-appearing  Throat: Oropharynx moist without lesions  Lungs:   Clear to auscultation bilaterally; no wheezes, rhonchi or rales; respirations unlabored on room air   Heart:  RRR; no murmur, rub or gallop appreciated   Abdomen:   Soft, mild discomfort when I palpate around the PEG tube, non-distended, positive bowel sounds  Extremities: No clubbing, cyanosis or edema   Skin: No new rashes or lesions  No new draining wounds noted  Labs, Imaging, & Other studies:   All pertinent labs and imaging studies were personally reviewed  Results from last 7 days   Lab Units 22  1601 22  1218 22  1040   WBC Thousand/uL 14 24* 9 06 8 61   HEMOGLOBIN g/dL 7 4* 7 3* 7 1*   PLATELETS Thousands/uL 579* 552* 508*     Results from last 7 days   Lab Units 22  1218 22  1040 22  0540 22  0934   POTASSIUM mmol/L 4 8   < > 4 8  --    CHLORIDE mmol/L 101   < > 99  --    CO2 mmol/L 26   < > 28  --    BUN mg/dL 24   < > 25  --    CREATININE mg/dL 1 37*   < > 1 51*  --    EGFR ml/min/1 73sq m 36   < > 32  --    CALCIUM mg/dL 8 5   < > 8 4  --    AST U/L 41  --  72* 146*   ALT U/L 39  --  56 87*   ALK PHOS U/L 177*  --  181* 211*    < > = values in this interval not displayed

## 2022-09-23 NOTE — OCCUPATIONAL THERAPY NOTE
Occupational Therapy Cancel Note:    Occupational therapy attempted however pt nurse reported that pt has a fever at this time  OT will attempt again next treatment       LIVIA Her     09/23/22 1220   Note Type   Note Type Cancelled Session

## 2022-09-23 NOTE — ASSESSMENT & PLAN NOTE
· Recent hx of staph epi bacteremia in 7/2022, presumed due to picc line  ·  recurrence 1/2 set staph epi, 2nd set no growth thus far  · Had been on IV vancomycin but discontinued due to fluctuating levels  Completed vancomycin to daptomycin courses 9/15  · S/p TAVIA (9/9) no evidence of vegetation  · Repeat blood cx neg x 5 days   · TPN discontinued   S/p PEG placement  · PICC line discontinued    New fever today, pan culture and workup initiated

## 2022-09-23 NOTE — ASSESSMENT & PLAN NOTE
· Exacerbated by diarrhea/dehydration however patient is chronically malnourished     · S/p PT/OT evaluation; to return to SNF  · Encouraged participation with PT/OT

## 2022-09-23 NOTE — ASSESSMENT & PLAN NOTE
· Palliative on board  Family meeting held with prior provider:  patient has had a change of heart and wants to be txt focused and proceed with PEG placement  She states that she realized that she is not ready to die  · 9/15:  Allie Blakely discussion held with patient regarding likely outcomes of either cardiac arrest or respiratory failure requiring intubation given her current clinical state    Patient states she wishes to be DNR DNI with aggressive care Enhanced supervision

## 2022-09-23 NOTE — ASSESSMENT & PLAN NOTE
Likely secondary to severe constipation, patient with small bowel movements today with significant improvement in pain  KUB suggestive of large volume stool burden,  Pain has subsided- ultrasound showed cholelithiasis without cholecystitis

## 2022-09-23 NOTE — ASSESSMENT & PLAN NOTE
· Resolved- now with new fever today 9/23        Initial source was bacteremia, C diff colitis  ID was consulted  · + evidence of colitis on ct scan  Status post C diff panel; positive toxin PCR but negative EIA  On p o  Vancomycin through 9/18  Diarrhea resolved  · Positive recurrent bacteremia  Presumed secondary to PICC line with use of TPN  Prior history of recent MSSE  · Repeat blood cx neg after 5 days  · Discontinued presenting PICC  New PICC ordered (9/9) which was dc'ed after abx iv daptomycin completed  · Patient remained stable for few days however today again spiked fever 102- new labs along with blood cultures ordered  UA and chest x-ray, CT abdomen pelvis  Id reconsulted and discuss case with ID    · Hold dc plans

## 2022-09-23 NOTE — ASSESSMENT & PLAN NOTE
· Patient with history of gastric bypass surgery  Has had significant malnutrition issues and anastomotic ulcerations  Seen by Bariatric Service during recent hospitalization at [facility] who recommended prolonged course of TPN for nutritional optimization and possible revision of surgery in the future  · Was planned to have appt with bariatric sx on 8/20 however had to be rescheduled  · Given recurrent bacteremia; s/p GI consult; recommended discontinuation of TPN  S/p PEG, TF initiated- nutrition adjusted rate of TF  · P o   Diet for pleasure feeding resumed  · To f/u with bariatric surgery

## 2022-09-23 NOTE — PLAN OF CARE
Problem: INFECTION - ADULT  Goal: Absence or prevention of progression during hospitalization  Description: INTERVENTIONS:  - Assess and monitor for signs and symptoms of infection  - Monitor lab/diagnostic results  - Monitor all insertion sites, i e  indwelling lines, tubes, and drains  - Monitor endotracheal if appropriate and nasal secretions for changes in amount and color  - [place] appropriate cooling/warming therapies per order  - Administer medications as ordered  - Instruct and encourage patient and family to use good hand hygiene technique  - Identify and instruct in appropriate isolation precautions for identified infection/condition  Outcome: Progressing  Goal: Absence of fever/infection during neutropenic period  Description: INTERVENTIONS:  - Monitor WBC    Outcome: Progressing

## 2022-09-23 NOTE — PROGRESS NOTES
[facility]  Progress Note - Agapito Pennington 1945, 76 y o  female MRN: [id number]  Unit/Bed#: [facility] 317-01 Encounter: [id number]  Primary Care Provider: Lise Ray MD   Date and time admitted to hospital: 8/30/2022 11:49 PM    * Sepsis [facility])  Assessment & Plan  · Resolved- now with new fever today 9/23        Initial source was bacteremia, C diff colitis  ID was consulted  · + evidence of colitis on ct scan  Status post C diff panel; positive toxin PCR but negative EIA  On p o  Vancomycin through 9/18  Diarrhea resolved  · Positive recurrent bacteremia  Presumed secondary to PICC line with use of TPN  Prior history of recent MSSE  · Repeat blood cx neg after 5 days  · Discontinued presenting PICC  New PICC ordered (9/9) which was dc'ed after abx iv daptomycin completed  · Patient remained stable for few days however today again spiked fever 102- new labs along with blood cultures ordered  UA and chest x-ray, CT abdomen pelvis  Id reconsulted and discuss case with ID  · Hold dc plans      Bacteremia  Assessment & Plan  · Recent hx of staph epi bacteremia in 7/2022, presumed due to picc line  ·  recurrence 1/2 set staph epi, 2nd set no growth thus far  · Had been on IV vancomycin but discontinued due to fluctuating levels  Completed vancomycin to daptomycin courses 9/15  · S/p TAVIA (9/9) no evidence of vegetation  · Repeat blood cx neg x 5 days   · TPN discontinued  S/p PEG placement  · PICC line discontinued    New fever today, pan culture and workup initiated      H/O bariatric surgery - bypass  Assessment & Plan  · Patient with history of gastric bypass surgery  Has had significant malnutrition issues and anastomotic ulcerations  Seen by Bariatric Service during recent hospitalization at [facility] who recommended prolonged course of TPN for nutritional optimization and possible revision of surgery in the future    · Was planned to have appt with bariatric sx on 8/20 however had to be rescheduled  · Given recurrent bacteremia; s/p GI consult; recommended discontinuation of TPN  S/p PEG, TF initiated- nutrition adjusted rate of TF  · P o  Diet for pleasure feeding resumed  · To f/u with bariatric surgery      Transaminitis  Assessment & Plan  · NO RQU pain, USG with gallstones, no choelcystitis  · Trend LFTs- trending down     Left upper quadrant abdominal pain  Assessment & Plan  Likely secondary to severe constipation, patient with small bowel movements today with significant improvement in pain  KUB suggestive of large volume stool burden,  Pain has subsided- ultrasound showed cholelithiasis without cholecystitis    Acute encephalopathy  Assessment & Plan  · Resolved  · Secondary to hospital induced delirium vs metabolic encephalopathy  · No focal deficit on exam  · Delirium precautions      Goals of care, counseling/discussion  Assessment & Plan  · Palliative on board  Family meeting held with prior provider:  patient has had a change of heart and wants to be txt focused and proceed with PEG placement  She states that she realized that she is not ready to die  · 9/15:  Rsoario Sutton discussion held with patient regarding likely outcomes of either cardiac arrest or respiratory failure requiring intubation given her current clinical state    Patient states she wishes to be DNR DNI with aggressive care      Acute on chronic anemia  Assessment & Plan  · Evaluated by GI prior  · 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  · S/p recent EGD in 7/22 revealing: Residual marginal ulcer of the gastrojejunostomy anastomosis with improved size  · S/p EGD 9/2 revealing: Large, cratered ulcer in the gastrojejunal anastomosis   · Received PRBC transfusion x 1 this admission,  continue monitoring transfuse less than 7  · Cont PPI, BID, carafate    Moderate protein-calorie malnutrition ([address] )  Assessment & Plan  Malnutrition Findings:   Adult Malnutrition type: Chronic illness  Adult Degree of Malnutrition: Other severe protein calorie malnutrition  Malnutrition Characteristics: Weight loss, Muscle loss     On Jevity 1 2 - resume at lower max rate   Checked CMP and it shows hyponatremia of 130, low albumin but mildly elevated AST and A phos    360 Statement: Severe/Chronic malnutrition r/t condition, as evidenced by 4 8% wt loss x < 1 week (9/2/22: 125#, 9/5/22: 119#), and severe muscle mass depletion (temples/clavicle)  Treated with liberalized diet and nutrition supplements, possibly nutrition support pending goals of care    BMI Findings: Body mass index is 18 28 kg/m²  Hx of Savage-en Y gastric bypass, 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  Now off due to recurrent bacteremia  S/p PEG placement and on PO feeds  Cont nutritional supplements    Ambulatory dysfunction  Assessment & Plan  · Exacerbated by diarrhea/dehydration however patient is chronically malnourished  · S/p PT/OT evaluation; to return to SNF  · Encouraged participation with PT/OT      ONAH (acute kidney injury) ([facility] [address] )  Assessment & Plan  Cr renal function remains impaired but stable  Suspect volume depletion as main etiology,   Started gentle IV hydration - now creatinine trending down- dc ivf- adjust tube flushes  Avoid all possible nephrotoxins  Hyponatremia  Assessment & Plan  Na at 132- no AMS  Adjust TF and po feeds          VTE Pharmacologic Prophylaxis: VTE Score: 3 Moderate Risk (Score 3-4) - Pharmacological DVT Prophylaxis Ordered: heparin  Patient Centered Rounds: I performed bedside rounds with nursing staff today  Discussions with Specialists or Other Care Team Provider: ID    Education and Discussions with Family / Patient: Updated  (sister) via phone  Time Spent for Care: 20 minutes  More than 50% of total time spent on counseling and coordination of care as described above      Current Length of Stay: 23 day(s)  Current Patient Status: Inpatient   Certification Statement: The patient will continue to require additional inpatient hospital stay due to fever  Discharge Plan: Anticipate discharge in 48-72 hrs to rehab facility  Code Status: Level 3 - DNAR and DNI    Subjective:   Resting in bed, patient has mood variation, some time she is very nice and answers all questions but  some time she tends to be irritable  early morning she did not want to be bothered, so rounded again around noon, she was feeling better but she spiked fever  T she denies any chest pain, shortness a breath, nausea or vomiting  Regarding  abdominal pain she said I guess is allright now    Objective:     Vitals:   Temp (24hrs), Av 8 °F (37 7 °C), Min:98 °F (36 7 °C), Max:102 4 °F (39 1 °C)    Temp:  [98 °F (36 7 °C)-102 4 °F (39 1 °C)] 102 4 °F (39 1 °C)  HR:  [] 102  Resp:  [16-17] 17  BP: (107-131)/(60-64) 131/60  SpO2:  [92 %-94 %] 92 %  Body mass index is 18 28 kg/m²  Input and Output Summary (last 24 hours):   No intake or output data in the 24 hours ending 22 1447    Physical Exam:   Physical Exam  Vitals and nursing note reviewed  Constitutional:       General: She is not in acute distress  Appearance: She is well-developed  Comments: Frail, cachectic   HENT:      Head: Normocephalic and atraumatic  Mouth/Throat:      Mouth: Mucous membranes are moist    Eyes:      Conjunctiva/sclera: Conjunctivae normal    Cardiovascular:      Rate and Rhythm: Normal rate and regular rhythm  Heart sounds: No murmur heard  Pulmonary:      Effort: Pulmonary effort is normal  No respiratory distress  Breath sounds: Normal breath sounds  Abdominal:      General: Bowel sounds are normal       Palpations: Abdomen is soft  Tenderness: There is no abdominal tenderness  Comments: Peg in place   Musculoskeletal:         General: Normal range of motion  Cervical back: Neck supple  Comments: Muscle wasting   Skin:     General: Skin is warm and dry  Neurological:      General: No focal deficit present  Mental Status: She is alert  Mental status is at baseline     Psychiatric:      Comments: Mood fluctuates          Additional Data:     Labs:  Results from last 7 days   Lab Units 09/22/22  1218   WBC Thousand/uL 9 06   HEMOGLOBIN g/dL 7 3*   HEMATOCRIT % 23 6*   PLATELETS Thousands/uL 552*   NEUTROS PCT % 56   LYMPHS PCT % 22   MONOS PCT % 11   EOS PCT % 10*     Results from last 7 days   Lab Units 09/22/22  1218   SODIUM mmol/L 132*   POTASSIUM mmol/L 4 8   CHLORIDE mmol/L 101   CO2 mmol/L 26   BUN mg/dL 24   CREATININE mg/dL 1 37*   ANION GAP mmol/L 5   CALCIUM mg/dL 8 5   ALBUMIN g/dL 1 8*   TOTAL BILIRUBIN mg/dL 0 23   ALK PHOS U/L 177*   ALT U/L 39   AST U/L 41   GLUCOSE RANDOM mg/dL 86         Results from last 7 days   Lab Units 09/23/22  1122 09/23/22  0624 09/23/22  0533 09/23/22  0131 09/22/22  2115 09/22/22  1325 09/22/22  0541 09/22/22  0108 09/21/22  1815 09/21/22  1147 09/21/22  1145 09/21/22  0702   POC GLUCOSE mg/dl 141* 99 108 114 125 85 124 123 97 69 70 109               Lines/Drains:  Invasive Devices  Report    Peripheral Intravenous Line  Duration           Peripheral IV 09/19/22 Right Antecubital 4 days          Drain  Duration           External Urinary Catheter 48 days    Gastrostomy/Enterostomy Percutaneous Endoscopic Gastrostomy (PEG) 20 Fr  LUQ 14 days                      Imaging: Reviewed radiology reports from this admission including: ultrasound(s)    Recent Cultures (last 7 days):         Last 24 Hours Medication List:   Current Facility-Administered Medications   Medication Dose Route Frequency Provider Last Rate   • acetaminophen  650 mg Oral Q6H PRN Jovita Mcdermott, DO     • ALPRAZolam  0 25 mg Oral TID PRN Jeff Bentley DO     • alteplase  2 mg Intracatheter Once [employer], PA-C     • bisacodyl  10 mg Rectal Daily PRN Di Plasencia MD     • clonazePAM  3 mg Oral HS Jovita Valdesel, DO     • folic acid  1 mg Oral Daily Jovitakash Mcdermott, DO     • heparin (porcine)  5,000 Units Subcutaneous [facility] Yung Grace DO     • levothyroxine  112 mcg Oral Early Morning Jovitaaixa Mcdermott DO     • magnesium hydroxide  30 mL Oral Daily PRN Mika Renner MD     • metoclopramide  10 mg Intravenous Q6H PRN Mika Renner MD     • midodrine  5 mg Oral TID AC Jovita Mcdermott DO     • ondansetron  4 mg Intravenous Q6H PRN Deja Barcenas DO     • oxyCODONE  5 mg Oral Q6H Mika Renner MD     • pantoprazole  40 mg Oral BID AC Kaylan Arroyo DO     • polyethylene glycol  17 g Oral Daily Kurt Kayser, MD     • senna  2 tablet Oral HS Kurt Kayser, MD     • sucralfate  1 g Oral 4x Daily (AC & HS) Yung Grace DO     • thiamine  100 mg Oral Daily Jovitaaixa Mcdermott DO     • traZODone  150 mg Oral HS Deja Barcenas DO          Today, Patient Was Seen By: Samreen Elias MD    **Please Note: This note may have been constructed using a voice recognition system  **

## 2022-09-23 NOTE — ASSESSMENT & PLAN NOTE
Cr renal function remains impaired but stable  Suspect volume depletion as main etiology,   Started gentle IV hydration - now creatinine trending down- dc ivf- adjust tube flushes  Avoid all possible nephrotoxins

## 2022-09-24 NOTE — PROGRESS NOTES
Unable to retrieve AM lab work due to technicality, patient refusing to allow staff to attempt again, stated would like to wait later when she's woken up more    Will pass it along to day team  No further needs

## 2022-09-24 NOTE — ASSESSMENT & PLAN NOTE
Cr renal function remains impaired but stable  Suspect volume depletion as main etiology   Continue gentle IV fluids  Avoid all possible nephrotoxins

## 2022-09-24 NOTE — QUICK NOTE
TT by nursing with concern for mental status   This pt known to Dr Jose Juan Dorman and no change from when seen by her on Monday  Pt is lethargic responds to stimuli   Annoyed with intervention or questioning  Review of labs note pt with hyponatremia and started on iv fluids will rechk cmp for now chk if improving

## 2022-09-24 NOTE — PROGRESS NOTES
INTERNAL MEDICINE PROGRESS NOTE     Name: Belia Hernandez   Age & Sex: 76 y o  female   MRN: [id number]  Unit/Bed#: [facility] 317-01   Encounter: [id number]  Hospital Stay Days: 25      ASSESSMENT/PLAN     Principal Problem:    Sepsis ([facility] [address] )  Active Problems:    Hyponatremia    H/O bariatric surgery - bypass    NOAH (acute kidney injury) ([facility] [address] )    Ambulatory dysfunction    Moderate protein-calorie malnutrition ([facility] )    Bacteremia    Acute on chronic anemia    Goals of care, counseling/discussion    Acute encephalopathy    Left upper quadrant abdominal pain    Transaminitis      Transaminitis  Assessment & Plan  · NO RQU pain, USG with gallstones, no choelcystitis  · Trend LFTs- trending down     Left upper quadrant abdominal pain  Assessment & Plan  Likely secondary to severe constipation, patient with small bowel movements today with significant improvement in pain, KUB suggestive of large volume stool burden,  Pain has subsided- ultrasound showed cholelithiasis without cholecystitis    Patient this morning having increased pain around PEG tube site  Gastroenterology recommendations appreciated, no signs of infection  If pain persist will obtain tube study  MiraLax b i d  Acute encephalopathy  Assessment & Plan  · Resolved  · Secondary to hospital induced delirium vs metabolic encephalopathy  · No focal deficit on exam  · Delirium precautions      Goals of care, counseling/discussion  Assessment & Plan  · Palliative on board  Family meeting held with prior provider:  patient has had a change of heart and wants to be txt focused and proceed with PEG placement  She states that she realized that she is not ready to die  · 9/15:  Contreras Briggs discussion held with patient regarding likely outcomes of either cardiac arrest or respiratory failure requiring intubation given her current clinical state    Patient states she wishes to be DNR DNI with aggressive care      Acute on chronic anemia  Assessment & Plan  · Evaluated by GI prior  · 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  · S/p recent EGD in 7/22 revealing: Residual marginal ulcer of the gastrojejunostomy anastomosis with improved size  · S/p EGD 9/2 revealing: Large, cratered ulcer in the gastrojejunal anastomosis   · Received PRBC transfusion x 1 this admission,  continue monitoring transfuse less than 7  · Cont PPI, BID, carafate    Bacteremia  Assessment & Plan  · Recent hx of staph epi bacteremia in 7/2022, presumed due to picc line  ·  recurrence 1/2 set staph epi, 2nd set no growth thus far  · Had been on IV vancomycin but discontinued due to fluctuating levels  Completed vancomycin to daptomycin courses 9/15  · S/p TAVIA (9/9) no evidence of vegetation  · Repeat blood cx neg x 5 days   · TPN discontinued  S/p PEG placement  · PICC line discontinued    New fever 9/23/22, pan culture and workup initiated, appreciate infectious disease input      Moderate protein-calorie malnutrition ([facility] [address] )  Assessment & Plan  Malnutrition Findings:   Adult Malnutrition type: Chronic illness  Adult Degree of Malnutrition: Other severe protein calorie malnutrition  Malnutrition Characteristics: Weight loss, Muscle loss     On Jevity 1 2 - resume at lower max rate   Checked CMP and it shows hyponatremia of 130, low albumin but mildly elevated AST and A phos    360 Statement: Severe/Chronic malnutrition r/t condition, as evidenced by 4 8% wt loss x < 1 week (9/2/22: 125#, 9/5/22: 119#), and severe muscle mass depletion (temples/clavicle)  Treated with liberalized diet and nutrition supplements, possibly nutrition support pending goals of care    BMI Findings: Body mass index is 18 28 kg/m²  Hx of Asvage-en Y gastric bypass, 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    Ambulatory dysfunction  Assessment & Plan  · Exacerbated by diarrhea/dehydration however patient is chronically malnourished  · S/p PT/OT evaluation; to return to SNF  · Encouraged participation with PT/OT      NOAH (acute kidney injury) [facility])  Assessment & Plan  Cr renal function remains impaired but stable  Suspect volume depletion as main etiology   Continue gentle IV fluids  Avoid all possible nephrotoxins  H/O bariatric surgery - bypass  Assessment & Plan  · Patient with history of gastric bypass surgery  Has had significant malnutrition issues and anastomotic ulcerations  Seen by Bariatric Service during recent hospitalization at [facility] who recommended prolonged course of TPN for nutritional optimization and possible revision of surgery in the future  · Was planned to have appt with bariatric sx on 8/20 however had to be rescheduled  · Given recurrent bacteremia; s/p GI consult; recommended discontinuation of TPN  S/p PEG, TF initiated- nutrition adjusted rate of TF  · P O  Diet for pleasure feeding resumed  · To f/u with bariatric surgery      Hyponatremia  Assessment & Plan  Na at 132- no AMS  Adjust TF and PO feeds    * Sepsis ([facility] [address] )  Assessment & Plan  · Resolved- now with new fever today 9/23        Initial source was bacteremia, C diff colitis  ID was consulted  · + evidence of colitis on ct scan  Status post C diff panel; positive toxin PCR but negative EIA  On p o  Vancomycin through, Diarrhea resolved  · Positive recurrent bacteremia  Presumed secondary to PICC line with use of TPN  Prior history of recent MSSE  · Repeat blood cx neg after 5 days  · Discontinued presenting PICC  New PICC ordered (9/9) which was dc'ed after abx iv daptomycin completed  · Patient remained stable for few days however today again spiked fever 102- new labs along with blood cultures ordered  UA and chest x-ray, CT abdomen pelvis  Id reconsulted and discuss case with ID    · Continue ceftriaxone with vancomycin C diff prophylaxis  · Awaiting blood cultures/urinary cultures from 09/24/2022        VTE Pharmacologic Prophylaxis:   Pharmacologic: Heparin  Mechanical VTE Prophylaxis in Place: Yes    Patient Centered Rounds: I have performed bedside rounds with nursing staff today  Discussions with Specialists or Other Care Team Provider:  Appreciate gastroenterology recommendations    Education and Discussions with Family / Patient: Will discuss case this afternoon with family  Time Spent for Care: 30 minutes  More than 50% of total time spent on counseling and coordination of care as described above  Current Length of Stay: 24 day(s)    Current Patient Status: Inpatient   Certification Statement: The patient will continue to require additional inpatient hospital stay due to Continued gastroenterology workup    Discharge Plan / Estimated Discharge Date:  24-48 hours    Code Status: Level 3 - DNAR and DNI    Subjective:     Patient seen examined at bedside this morning  Patient still notes pain around PEG tube site  Appreciate Gastroenterology recommendations  Objective:   Vitals:   Temp (24hrs), Av 9 °F (37 2 °C), Min:98 1 °F (36 7 °C), Max:99 3 °F (37 4 °C)    Temp:  [98 1 °F (36 7 °C)-99 3 °F (37 4 °C)] 98 1 °F (36 7 °C)  HR:  [] 93  Resp:  [14-17] 14  BP: ()/(34-62) 94/40  SpO2:  [94 %] 94 %  Body mass index is 18 28 kg/m²  Input and Output Summary (last 24 hours): Intake/Output Summary (Last 24 hours) at 2022 1311  Last data filed at 2022 1034  Gross per 24 hour   Intake 3396 25 ml   Output 900 ml   Net 2496 25 ml     Physical Exam:   Physical Exam  Constitutional:       General: She is not in acute distress  HENT:      Head: Normocephalic and atraumatic  Eyes:      General: No scleral icterus  Pupils: Pupils are equal, round, and reactive to light  Cardiovascular:      Rate and Rhythm: Normal rate and regular rhythm  Pulses: Normal pulses  Heart sounds: No murmur heard  Pulmonary:      Effort: Pulmonary effort is normal  No respiratory distress  Breath sounds: No wheezing  Abdominal:      General: Bowel sounds are normal  There is no distension  Tenderness: There is abdominal tenderness  Comments: No erythema or signs of infection around PEG tube site   Musculoskeletal:         General: No swelling or deformity  Skin:     General: Skin is warm  Capillary Refill: Capillary refill takes less than 2 seconds  Coloration: Skin is not jaundiced  Neurological:      General: No focal deficit present  Mental Status: She is alert  Mental status is at baseline  Psychiatric:         Mood and Affect: Mood normal          Behavior: Behavior normal            Additional Data:   Basic Laboratory Review:   Results from last 7 days   Lab Units 09/23/22  2244 09/23/22  1601 09/22/22  1218   SODIUM mmol/L 128* 128* 132*   POTASSIUM mmol/L 5 3 5 4* 4 8   CHLORIDE mmol/L 99 98 101   CO2 mmol/L 24 24 26   BUN mg/dL 29* 28* 24   CREATININE mg/dL 1 54* 1 52* 1 37*   GLUCOSE RANDOM mg/dL 115 111 86   CALCIUM mg/dL 8 3 8 7 8 5   ALBUMIN g/dL 1 9* 1 8* 1 8*   ALK PHOS U/L 211* 228* 177*   ALT U/L 53 61 39   AST U/L 63* 84* 41   EGFR ml/min/1 73sq m 31 32 36       Results from last 7 days   Lab Units 09/23/22  1601 09/22/22  1218 09/21/22  1040   WBC Thousand/uL 14 24* 9 06 8 61   HEMOGLOBIN g/dL 7 4* 7 3* 7 1*   HEMATOCRIT % 23 4* 23 6* 23 1*   PLATELETS Thousands/uL 579* 552* 508*   NEUTROS PCT % 71 56 58   LYMPHS PCT % 15 22 20   MONOS PCT % 7 11 10   EOS PCT % 6 10* 11*   BASOS PCT % 0 0 0   NEUTROS ABS Thousands/µL 10 14* 5 12 4 99   LYMPHS ABS Thousands/µL 2 14 1 96 1 68   MONOS ABS Thousand/µL 0 98 0 99 0 88   EOS ABS Thousand/µL 0 79* 0 88* 0 97*   BASOS ABS Thousands/µL 0 03 0 03 0 03               Additional Labs:  Results from last 7 days   Lab Units 09/23/22  1601   LACTIC ACID mmol/L 1 4                Recent Cultures (last 7 days):   Results from last 7 days   Lab Units 09/23/22  1558   BLOOD CULTURE  Received in Microbiology Lab   Culture in Progress  Received in Microbiology Lab  Culture in Progress  * I Have Reviewed All Lab Data Listed Above  * Additional Pertinent Lab Tests Reviewed:  Dread 66 Admission Reviewed    Imaging:  Prior imaging studies and reports reviewed    Last 24 Hours Medication List:   Current Facility-Administered Medications   Medication Dose Route Frequency Provider Last Rate   • acetaminophen  650 mg Oral Q6H PRN Jovitakash Mcdermott, DO     • ALPRAZolam  0 25 mg Oral TID PRN Kt Chen DO     • alteplase  2 mg Intracatheter Once Larry Sevilla PA-C     • bisacodyl  10 mg Rectal Daily PRN Guzman Ruiz MD     • bisacodyl  10 mg Rectal Once Leticia Marie MD     • cefTRIAXone  1,000 mg Intravenous Q24H Priya Sandra MD Stopped (09/24/22 0600)   • clonazePAM  3 mg Oral HS Jovitakash Mcdermott, DO     • folic acid  1 mg Oral Daily Jovita Baldemar, DO     • heparin (porcine)  5,000 Units Subcutaneous [facility] Kt Chen, DO     • levothyroxine  112 mcg Oral Early Morning Jovitakash Mcdermott, DO     • magnesium hydroxide  30 mL Oral Daily PRN Guzman Ruiz MD     • metoclopramide  10 mg Intravenous Q6H PRN Guzman Ruiz MD     • midodrine  5 mg Oral TID AC Jovita Mcdermott DO     • ondansetron  4 mg Intravenous Q6H PRN Liz Mtz DO     • oxyCODONE  5 mg Oral Q6H Guzman Ruiz MD     • pantoprazole  40 mg Oral BID AC Kaylan Miriamchary Arroyo DO     • polyethylene glycol  17 g Per PEG Tube BID Liz Mtz DO     • senna  2 tablet Oral HS Donell Borges MD     • sodium chloride  100 mL/hr Intravenous Continuous Bon Peck DO 75 mL/hr (09/24/22 6793)   • sucralfate  1 g Oral 4x Daily (AC & HS) Kt Chen DO     • thiamine  100 mg Oral Daily Jovitakash Mcdermott DO     • traZODone  150 mg Oral HS Jovitakash Mcdermott, DO     • vancomycin  125 mg Oral Q12H Gilles Chávez MD       Today, Patient Was Seen By: Anita Cooley DO

## 2022-09-24 NOTE — ASSESSMENT & PLAN NOTE
Na at 129 today from 132 yesterday  Will obtain urine electrolyte studies today  Renew her Nacl tab 1 gm TID  BMP in am   Adjust TF and PO feeds

## 2022-09-24 NOTE — PROGRESS NOTES
Progress Note - Infectious Disease   Tacho Cuello 76 y o  female MRN: [id number]  Unit/Bed#: [facility] 317-01 Encounter: [id number]      Impression/Recommendations:  1  SIRS syndrome and possible sepsis, recurrent  Isolated fever along with elevation in white count 9/23  Noted to have eosinophils on differential   Condier UTI given dysuria, pyuria on UA  Urine cultures pending  no other appreciable source  Blood cultures negative  Recent RUQ U/S in CT with cholelithiasis but no clinical evidence of cholecystitis  Continue ceftriaxone for now  Follow-up urine and blood cultures  Continue to trend fever curve/vitals  Repeat CBC/chemistry tomorrow  Follow up extremity and lower extremity Dopplers  Additional supportive care as per primary  Additional interventions pending clinical course     2  Possible UTI  UA with some pyuria and patient reporting dysuria  urine culture pending  Antibiotic as above for now  Monitor for ongoing symptoms  Continue to trend fever curve/WBC  Follow-up pending urine cultures     3  Abnormal abdominal imaging  Patient noted to have some abnormal changes to the gallbladder on imaging  Recent ultrasound unremarkable  Alkaline phosphatase borderline  Patient only localizes discomfort near the PEG tube  Monitor abdominal exam  Low threshold for repeat imaging  Antibiotic as above  Continue to trend fever curve/WBC     4  Recent C diff  Continue C diff prophylaxis while on IV antibiotics     5  Progressing anemia and recent GI bleed  Hemoglobin relatively stable  Patient denies any reports of significant diarrhea or bloody stool  Continue to monitor      6  Elevated serum creatinine  Patient's creatinine has essentially remained elevated since earlier in admission  Uncertain if ongoing drug reaction as above  Could also be intake related    Consider Nephrology evaluation  Fluid hydration as per primary  Continue enteral feeding  Consider review of medications     Antibiotics:  Ceftriaxone #2  PO vancomycin #2    Subjective:  Patient seen on PM rounds  Patient sleeping and not awakened  RN reports no events  24 Hour Events:  Fever improved  Unable to obtain labs this AM   GI adjusted PEG  UA with pyuria  Objective:  Vitals:  Temp:  [98 1 °F (36 7 °C)-99 3 °F (37 4 °C)] 98 1 °F (36 7 °C)  HR:  [] 93  Resp:  [14-17] 14  BP: ()/(34-62) 94/40  SpO2:  [94 %] 94 %  Temp (24hrs), Av 9 °F (37 2 °C), Min:98 1 °F (36 7 °C), Max:99 3 °F (37 4 °C)  Current: Temperature: 98 1 °F (36 7 °C)    Physical Exam:   General:  No acute distress  Psychiatric:  Sleeping  Pulmonary:  Normal respiratory excursion without accessory muscle use  Abdomen:  Non-distended  Extremities:  No edema upper extremities  Skin:  No rashes    Lab Results:  I have personally reviewed pertinent labs  Results from last 7 days   Lab Units 22  2244 22  1601 22  1218   POTASSIUM mmol/L 5 3 5 4* 4 8   CHLORIDE mmol/L 99 98 101   CO2 mmol/L 24 24 26   BUN mg/dL 29* 28* 24   CREATININE mg/dL 1 54* 1 52* 1 37*   EGFR ml/min/1 73sq m 31 32 36   CALCIUM mg/dL 8 3 8 7 8 5   AST U/L 63* 84* 41   ALT U/L 53 61 39   ALK PHOS U/L 211* 228* 177*     Results from last 7 days   Lab Units 22  1601 22  1218 22  1040   WBC Thousand/uL 14 24* 9 06 8 61   HEMOGLOBIN g/dL 7 4* 7 3* 7 1*   PLATELETS Thousands/uL 579* 552* 508*     Results from last 7 days   Lab Units 22  1558   BLOOD CULTURE  Received in Microbiology Lab  Culture in Progress  Received in Microbiology Lab  Culture in Progress  Imaging Studies:   I have personally reviewed pertinent imaging study reports and images in PACS  EKG, Pathology, and Other Studies:   I have personally reviewed pertinent reports

## 2022-09-24 NOTE — ASSESSMENT & PLAN NOTE
· Resolved- now with new fever today 9/23        Initial source was bacteremia, C diff colitis  ID was consulted  · + evidence of colitis on ct scan  Status post C diff panel; positive toxin PCR but negative EIA  On p o  Vancomycin through, Diarrhea resolved  · Positive recurrent bacteremia  Presumed secondary to PICC line with use of TPN  Prior history of recent MSSE  · Repeat blood cx neg after 5 days  · Discontinued presenting PICC  New PICC ordered (9/9) which was dc'ed after abx iv daptomycin completed  · Patient remained stable for few days however spiked fever 102- new labs along with blood cultures ordered  UA and chest x-ray, CT abdomen pelvis    Id reconsulted and discuss case with ID   · UA positive and on Rocephin and po vanco prophylaxis  · Awaiting blood cultures/urinary cultures from 09/24/2022

## 2022-09-24 NOTE — ASSESSMENT & PLAN NOTE
· Recent hx of staph epi bacteremia in 7/2022, presumed due to picc line  ·  recurrence 1/2 set staph epi, 2nd set no growth thus far  · Had been on IV vancomycin but discontinued due to fluctuating levels  Completed vancomycin to daptomycin courses 9/15  · S/p TAVIA (9/9) no evidence of vegetation  · Repeat blood cx neg x 5 days   · TPN discontinued  S/p PEG placement  · PICC line discontinued    New fever 9/23/22, pan culture and workup initiated, appreciate infectious disease input  Probably from new UTI   Urine cx is pending while on Rocephin

## 2022-09-24 NOTE — ASSESSMENT & PLAN NOTE
Likely secondary to severe constipation, patient with small bowel movements today with significant improvement in pain, KUB suggestive of large volume stool burden,  Pain has subsided- ultrasound showed cholelithiasis without cholecystitis    Improved pain around PEG tube site  Gastroenterology recommendations appreciated, no signs of infection  Her retention disc was loosened to 2 5 cm from 1 5 cm yesterday  MiraLax b i d

## 2022-09-24 NOTE — ASSESSMENT & PLAN NOTE
· Palliative on board  Family meeting held with prior provider:  patient has had a change of heart and wants to be txt focused and proceed with PEG placement  She states that she realized that she is not ready to die  · 9/15:  Montrell Dawkins discussion held with patient regarding likely outcomes of either cardiac arrest or respiratory failure requiring intubation given her current clinical state    Patient states she wishes not to be DNR DNI with aggressive care

## 2022-09-24 NOTE — ASSESSMENT & PLAN NOTE
· Patient with history of gastric bypass surgery  Has had significant malnutrition issues and anastomotic ulcerations  Seen by Bariatric Service during recent hospitalization at [facility] who recommended prolonged course of TPN for nutritional optimization and possible revision of surgery in the future  · Was planned to have appt with bariatric sx on 8/20 however had to be rescheduled  · Given recurrent bacteremia; s/p GI consult; recommended discontinuation of TPN  S/p PEG, TF initiated- nutrition adjusted rate of TF  · P O   Diet for pleasure feeding resumed  · To f/u with bariatric surgery

## 2022-09-24 NOTE — NURSING NOTE
Patient lethargic unable to stay awake long enough to answer questions  Vitals stable  SLIM made aware, orders to follow

## 2022-09-24 NOTE — CONSULTS
Consultation - [address] [facility]  Raymond Roland 76 y o  female MRN: [id number]  Unit/Bed#: [facility] 317-01 Encounter: [id number]        Inpatient consult to gastroenterology  Consult performed by: Fabian Colón DO  Consult ordered by: Luisito Monzon MD          Reason for Consult / Principal Problem:     PEG site pain    ASSESSMENT AND PLAN:    76year-old female with history significant for depression, bipolar disease, HTN, GERD, iron deficiency anemia, s/p recent PEG placement on 9/8 presenting with  2 day history of diarrhea and persistent pain surrounding her PEG site  PEG tube bumper appears to be snug against abdomen with surrounding crusted lesions which may be contributing to her tenderness to palpation  Will attempt to remove some fluid  If pain continues to persists, will consider tube study  She does not appear to have an active PEG site infection given lack of erythema or purulent drainage  Suspect her large stool burden may also be contributing to abdominal pain  1  Will attempt to remove some fluid from balloon  2  If pain persists will obtain tube study  3  Continue to monitor for signs of PEG site infection (ie  Erythema, drainage)  4  Start Miralax BID for constipation  Rest of care per primary team   Thank you for this consultation  ______________________________________________________________________    HPI:  Cristian Ventura is a 68-year-old female with history significant for depression, bipolar disease, HTN, GERD, iron deficiency anemia, s/p recent PEG placement on 9/8 whom we are asked to see in consultation for persistent pain around PEG site  Patient initially presented with 2 day history of diarrhea, found to have CT with stool burden and gallstones  She is tolerating TPN without any nausea/vomiting  No purulent drainage around PEG site and denies any fevers/chills   She reports persistent pain for weeks upon palpation of the PEG tube along with crusted lesion underneath the bumper  REVIEW OF SYSTEMS:  10 point ROS reviewed and negative except otherwise noted in the HPI above       Historical Information   Past Medical History:   Diagnosis Date   • Anemia    • Anxiety    • Bipolar disorder ([address] )    • Colon polyp    • Disease of thyroid gland    • Essential hypertension    • Essential tremor    • Fibromyalgia, primary    • GERD (gastroesophageal reflux disease)    • Inflammatory polyarthropathy (HCC)    • Mammogram abnormal    • Pressure injury of skin      Past Surgical History:   Procedure Laterality Date   • BREAST BIOPSY Right 2015    benign   • CARPAL TUNNEL RELEASE Bilateral    • COLONOSCOPY     • EGD AND COLONOSCOPY  2014   • GASTRIC BYPASS  2012   • MAMMO NEEDLE LOCALIZATION RIGHT (ALL INC) Right 2012   • MAMMO NEEDLE LOCALIZATION RIGHT (ALL INC) Right 2012   • ULNAR NERVE TRANSPOSITION Right      Social History   Social History     Substance and Sexual Activity   Alcohol Use Yes   • Alcohol/week: 4 0 standard drinks   • Types: 4 Glasses of wine per week    Comment: rare     Social History     Substance and Sexual Activity   Drug Use Never     Social History     Tobacco Use   Smoking Status Former Smoker   • Quit date:    • Years since quittin 7   Smokeless Tobacco Never Used     Family History   Problem Relation Age of Onset   • No Known Problems Mother    • No Known Problems Father    • No Known Problems Sister    • No Known Problems Maternal Grandmother    • No Known Problems Maternal Grandfather    • No Known Problems Paternal Grandmother    • No Known Problems Paternal Grandfather    • No Known Problems Sister    • No Known Problems Sister    • Stomach cancer Maternal Aunt    • No Known Problems Maternal Aunt    • No Known Problems Maternal Aunt    • No Known Problems Maternal Aunt    • No Known Problems Paternal Aunt    • Leukemia Other        Meds/Allergies     Medications Prior to Admission   Medication   • clonazePAM (KlonoPIN) 1 mg tablet   • folic acid (FOLVITE) 1 mg tablet   • levothyroxine 112 mcg tablet   • midodrine (PROAMATINE) 5 mg tablet   • sucralfate (CARAFATE) 1 g tablet   • thiamine (VITAMIN B1) 100 mg tablet   • traZODone (DESYREL) 50 mg tablet     Current Facility-Administered Medications   Medication Dose Route Frequency   • acetaminophen (TYLENOL) tablet 650 mg  650 mg Oral Q6H PRN   • ALPRAZolam (XANAX) tablet 0 25 mg  0 25 mg Oral TID PRN   • alteplase (CATHFLO) injection 2 mg  2 mg Intracatheter Once   • bisacodyl (DULCOLAX) rectal suppository 10 mg  10 mg Rectal Daily PRN   • bisacodyl (DULCOLAX) rectal suppository 10 mg  10 mg Rectal Once   • cefTRIAXone (ROCEPHIN) 1,000 mg in dextrose 5 % 50 mL IVPB  1,000 mg Intravenous Q24H   • clonazePAM (KlonoPIN) tablet 3 mg  3 mg Oral HS   • folic acid (FOLVITE) tablet 1 mg  1 mg Oral Daily   • heparin (porcine) subcutaneous injection 5,000 Units  5,000 Units Subcutaneous Q8H [address]   • levothyroxine tablet 112 mcg  112 mcg Oral Early Morning   • magnesium hydroxide (MILK OF MAGNESIA) oral suspension 30 mL  30 mL Oral Daily PRN   • metoclopramide (REGLAN) injection 10 mg  10 mg Intravenous Q6H PRN   • midodrine (PROAMATINE) tablet 5 mg  5 mg Oral TID AC   • ondansetron (ZOFRAN) injection 4 mg  4 mg Intravenous Q6H PRN   • oxyCODONE (ROXICODONE) IR tablet 5 mg  5 mg Oral Q6H   • pantoprazole (PROTONIX) EC tablet 40 mg  40 mg Oral BID AC   • polyethylene glycol (MIRALAX) packet 17 g  17 g Oral Daily   • senna (SENOKOT) tablet 17 2 mg  2 tablet Oral HS   • sodium chloride 0 9 % infusion  75 mL/hr Intravenous Continuous   • sucralfate (CARAFATE) tablet 1 g  1 g Oral 4x Daily (AC & HS)   • thiamine tablet 100 mg  100 mg Oral Daily   • traZODone (DESYREL) tablet 150 mg  150 mg Oral HS   • vancomycin (VANCOCIN) oral solution 125 mg  125 mg Oral Q12H RHONA       No Known Allergies      Objective     Blood pressure (!) 94/40, pulse 93, temperature 98 1 °F (36 7 °C), temperature source Oral, resp  rate 14, height 5' 4" (1 626 m), weight 48 3 kg (106 lb 7 7 oz), SpO2 94 %  Body mass index is 18 28 kg/m²  Intake/Output Summary (Last 24 hours) at 9/24/2022 1110  Last data filed at 9/24/2022 1034  Gross per 24 hour   Intake 3396 25 ml   Output 900 ml   Net 2496 25 ml         PHYSICAL EXAM:    General:  Chronically ill-appearing  Eyes: + pallor  CV: RRR, no m/r/g appreciated  Resp: CTAB, normal chest rise  Abdominal: firm, non-distended, markedly tender LUQ PEG site with surrounding crusting,  Extremities: Warm, no deformities, no edema  Skin: No rashes  Neuro: alert and oriented  Psych: Normal affect    Lab Results:   No results displayed because visit has over 200 results  Imaging Studies: I have personally reviewed pertinent imaging studies  EGD    Result Date: 9/15/2022  Narrative: [facility] [address] [place] [address] [phone] DATE OF SERVICE: 9/02/22 PHYSICIAN(S): Attending: Alicia Castrejon MD Fellow: Sara Montez DO INDICATION: Anemia, unspecified type POST-OP DIAGNOSIS: See the impression below  PREPROCEDURE: Informed consent was obtained for the procedure, including sedation  Risks of perforation, hemorrhage, adverse drug reaction and aspiration were discussed  The patient was placed in the left lateral decubitus position  Patient was explained about the risks and benefits of the procedure  Risks including but not limited to bleeding, infection, and perforation were explained in detail  Also explained about less than 100% sensitivity with the exam and other alternatives  DETAILS OF PROCEDURE: Patient was taken to the procedure room where a time out was performed to confirm correct patient and correct procedure   The patient underwent monitored anesthesia care, which was administered by an anesthesia professional  The patient's blood pressure, heart rate, level of consciousness, respirations and oxygen were monitored throughout the procedure  The scope was advanced to the second part of the duodenum  Retroflexion was performed in the fundus  The patient experienced no blood loss  The procedure was not difficult  The patient tolerated the procedure well  There were no apparent complications  ANESTHESIA INFORMATION: ASA: IV Anesthesia Type: IV Sedation with Anesthesia MEDICATIONS: No administrations occurring from 1541 to 1552 on 09/02/22 FINDINGS: The esophagus appeared normal  Large, cratered ulcer in the gastrojejunal anastomosis with flat pigmented spot ([place] IIC) The jejunum appeared normal  SPECIMENS: * No specimens in log *     Impression: Normal esophagus  Large [place] Class IIC marginal ulcer at the [address] anastomosis  No stigmata of active or recent bleeding Normal jejunum RECOMMENDATION: Return to floor resume diet  ATTENDING ATTESTATION:  I was present throughout the entire procedure from insertion to complete withdrawal of the scope  I performed all interventions myself or oversaw the fellow  Bird Vaughan MD     EGD Peg Tube Placement    Result Date: 9/9/2022  Narrative: [address] [place] [address] [phone] DATE OF SERVICE: 9/08/22 PHYSICIAN(S): Attending: Bird Vaughan MD Fellow: MD Bev Gerber DO INDICATION: Moderate protein-calorie malnutrition ([facility] [address] ) POST-OP DIAGNOSIS: See the impression below  PREPROCEDURE: Informed consent was obtained for the procedure, including sedation  Risks of perforation, hemorrhage, adverse drug reaction and aspiration were discussed  The patient was placed in the left lateral decubitus position  Patient was explained about the risks and benefits of the procedure  Risks including but not limited to bleeding, infection, and perforation were explained in detail  Also explained about less than 100% sensitivity with the exam and other alternatives   DETAILS OF PROCEDURE: Patient was taken to the procedure room where a time out was performed to confirm correct patient and correct procedure  The patient underwent monitored anesthesia care, which was administered by an anesthesia professional  The patient's blood pressure, heart rate, level of consciousness, respirations, oxygen and ETCO2 were monitored throughout the procedure  The scope was introduced through the mouth and advanced to the jejunum  Retroflexion was performed in the fundus  Prior to the procedure, the patient's H  Pylori status was unknown  The patient experienced no blood loss  The procedure was not difficult  The patient tolerated the procedure well  There were no apparent complications  ANESTHESIA INFORMATION: ASA: III Anesthesia Type: IV Sedation with Anesthesia MEDICATIONS: No administrations occurring from 1538 to 1616 on 09/08/22 FINDINGS: Previous gastric bypass surgery  History of Savage-en-Y gastric bypass The esophagus, gastric pouch and jejunum appeared normal  PEJ tube successfully placed in the jejunum via the pull technique after the site was identified via transillumination, visualized indentation and needle passed through abdominal wall; distance from external bolster to external end of tube: 1 5 cm; scope reinserted and tube rotated freely to confirm placement  First incision made and local anaesthesia was administered throughout the tract  Unfortunately when trocar was passed through the tract it could not enter the jejunum due to small bowel peristalsis so a separate incision was made and the trocar was re-introduced and entered the small bowel  SPECIMENS: * No specimens in log *     Impression: Previous gastric bypass surgery   History of Savage-en-Y gastric bypass The esophagus, gastric pouch and jejunum appeared normal  PEJ tube successfully placed in the jejunum via the pull technique after the site was identified via transillumination, visualized indentation and needle passed through abdominal wall; distance from external bolster to external end of tube: 1 5 cm; scope reinserted and tube rotated freely to confirm placement  First incision made and local anaesthesia was administered throughout the tract  Unfortunately when trocar was passed through the tract it could not enter the jejunum due to small bowel peristalsis so a separate incision was made and the trocar was re-introduced and entered the small bowel  RECOMMENDATION: Hold tube feeds until tomorrow Ok to use PEJ for medications and water Return to floors  ATTENDING ATTESTATION:  I was present throughout the entire procedure from insertion to complete withdrawal of the scope  I performed all interventions myself or oversaw the fellow  Tyrone Walters MD     CT abdomen pelvis wo contrast    Result Date: 9/23/2022  Narrative: CT ABDOMEN AND PELVIS WITHOUT IV CONTRAST INDICATION:   Abdominal pain, acute, nonlocalized fever, abd pain  COMPARISON:  CT abdomen pelvis August 31, 2022 TECHNIQUE:  CT examination of the abdomen and pelvis was performed without intravenous contrast  Axial, sagittal, and coronal 2D reformatted images were created from the source data and submitted for interpretation  Radiation dose length product (DLP) for this visit:  322 74 mGy-cm   This examination, like all CT scans performed in the [facility], was performed utilizing techniques to minimize radiation dose exposure, including the use of iterative  reconstruction and automated exposure control  Enteric contrast was not administered  FINDINGS: ABDOMEN LOWER CHEST:  Trace bilateral pleural effusions with minimal passive atelectasis LIVER/BILIARY TREE:  Unremarkable  GALLBLADDER:  Two adjacent gallstones measuring 1 3 and 1 0 cm  Gallbladder lumen is mildly distended  SPLEEN:  Unremarkable  PANCREAS:  Unremarkable  ADRENAL GLANDS:  Unremarkable  KIDNEYS/URETERS:  Unremarkable  No hydronephrosis  STOMACH AND BOWEL:  Post Savage-en-Y gastric bypass  Percutaneous jejunostomy tube  No bowel obstruction    Large amount of stool throughout the colon  APPENDIX:  No findings to suggest appendicitis  ABDOMINOPELVIC CAVITY:  No ascites  No pneumoperitoneum  No lymphadenopathy  VESSELS:  Atherosclerotic changes are present  No evidence of aneurysm  PELVIS REPRODUCTIVE ORGANS:  Unremarkable for patient's age  URINARY BLADDER:  Unremarkable  ABDOMINAL WALL/INGUINAL REGIONS:  Unremarkable  OSSEOUS STRUCTURES:  No acute fracture or destructive osseous lesion  Spinal degenerative changes are noted  Impression: 1  Cholelithiasis with mild gallbladder luminal distention  Note is made of patient's elevated alkaline phosphatase  Ultrasound was recently performed (September 21, 2022), and correlation for clinical signs of acute cholecystitis is recommended  2   Large colonic stool burden without bowel obstruction  3   Trace bilateral pleural effusions  Workstation performed: [id number]     XR chest portable    Result Date: 9/23/2022  Narrative: CHEST INDICATION:   fever  COMPARISON:  CXR 9/6/2022 and 7/30/2022, abdomen CT 8/31/2022 and 5/24/2022  EXAM PERFORMED/VIEWS:  XR CHEST PORTABLE FINDINGS: Cardiomediastinal silhouette appears unremarkable  Increased interstitial markings, improving from 9/6/2022  Trace right effusion  No pneumothorax  Clips in the left upper quadrant  Osseous structures appear within normal limits for patient age  Impression: Increased interstitial markings, improved from 9/6/2022, question interstitial edema or infectious/inflammatory  Workstation performed: [id number]     XR chest portable    Result Date: 9/7/2022  Narrative: CHEST INDICATION:   Verify PICC placement  COMPARISON:  7/30/2022, CT chest 5/24/2022 EXAM PERFORMED/VIEWS:  XR CHEST PORTABLE Images: 2 (duplicate image re-windowed with line enhancement technique) FINDINGS: -Right-sided PICC tip projects at the right subclavian vein  Cardiomediastinal silhouette appears unremarkable   Mild bilateral peribronchial thickening, possibly related to chronic bronchitis  No acute infiltrates  Minimal linear atelectasis or scarring in the mid lungs bilaterally  No pneumothorax or pleural effusion  Degenerative changes of the spine and bilateral glenohumeral joints  Surgical clips left upper quadrant  Impression: Right-sided PICC tip projects at the right subclavian vein  Mild bilateral peribronchial thickening, possibly related to chronic bronchitis  No acute infiltrates  Minimal linear atelectasis or scarring in the mid lungs bilaterally  Workstation performed: [id number]     XR abdomen 1 view kub    Result Date: 9/15/2022  Narrative: ABDOMEN INDICATION:   abdominal pain, eval for etiology/constipation  COMPARISON:  8/31/2022 abdominopelvic CT VIEWS:  AP supine FINDINGS: No bowel dilatation, air-fluid levels or evidence of free air Significant colon stool, particularly the rectum  No pathologic calcifications or soft tissue masses  Left upper quadrant surgical clips  Visualized lung bases are clear  Osseous degenerative change and scoliosis  Impression: No acute findings  Significant colon stool, correlate clinically for constipation  Workstation performed: [id number]     CT abdomen pelvis with contrast    Result Date: 8/31/2022  Narrative: CT ABDOMEN AND PELVIS WITH IV CONTRAST INDICATION:   Abdominal cramping, diarrhea, fever, hx of gastric bypass  COMPARISON:  CT abdomen pelvis on 5/24/2022  TECHNIQUE:  CT examination of the abdomen and pelvis was performed  Axial, sagittal, and coronal 2D reformatted images were created from the source data and submitted for interpretation  Radiation dose length product (DLP) for this visit:  354 mGy-cm   This examination, like all CT scans performed in the [facility], was performed utilizing techniques to minimize radiation dose exposure, including the use of iterative reconstruction and automated exposure control   IV Contrast:  80 mL of iohexol (OMNIPAQUE) Enteric Contrast:  Enteric contrast was administered  FINDINGS: ABDOMEN LOWER CHEST:  Atelectatic changes are noted at the lung bases  LIVER/BILIARY TREE:  Unremarkable  GALLBLADDER:  There are gallstone(s) within the gallbladder, without pericholecystic inflammatory changes  SPLEEN:  Unremarkable  PANCREAS:  Unremarkable  ADRENAL GLANDS:  Unremarkable  KIDNEYS/URETERS:  Unremarkable  No hydronephrosis  STOMACH AND BOWEL:  Diffuse wall thickening of the colon, most prominent in the cecum, ascending colon and transverse colon  There is surrounding fat stranding  Status post gastric bypass  No bowel obstruction  APPENDIX:  No findings to suggest appendicitis  ABDOMINOPELVIC CAVITY:  No ascites  No pneumoperitoneum  No lymphadenopathy  VESSELS:  Atherosclerotic changes are present  No evidence of aneurysm  PELVIS REPRODUCTIVE ORGANS:  Unremarkable for patient's age  URINARY BLADDER:  Mild circumferential wall thickening  ABDOMINAL WALL/INGUINAL REGIONS:  Unremarkable  OSSEOUS STRUCTURES:  No acute fracture or destructive osseous lesion  Impression: 1  Diffuse wall thickening of the colon with surrounding fat stranding suspicious for colitis i e  infectious or inflammatory  2   Mild circumferential wall thickening of the urinary bladder  Correlate with urinalysis for possible cystitis  3   Cholelithiasis  The study was marked in [facility] for immediate notification  Workstation performed: [id number]     US right upper quadrant with liver dopplers    Result Date: 9/22/2022  Narrative: RIGHT UPPER QUADRANT ULTRASOUND WITH LIVER DOPPLER INDICATION:     Elevated LFT  COMPARISON:  Abdominal ultrasound August 1, 2022  Correlation with CT abdomen pelvis August 31, 2022 TECHNIQUE:   Real-time ultrasound of the right upper quadrant was performed with a curvilinear transducer with both volumetric sweeps and still imaging techniques  FINDINGS: PANCREAS:  Visualized portions of the pancreas are within normal limits   AORTA AND IVC:  Visualized portions are normal for patient age  LIVER: Size:  Within normal range  The liver measures 16 4 cm in the midclavicular line  Contour:  Surface contour is smooth  Parenchyma:  Echogenicity and echotexture are within normal limits  No liver mass identified  LIVER DOPPLER: The main portal vein and primary branch segments are patent and hepatopetal with normal spectral waveform  Hepatic veins are patent  Spectral waveforms within normal limits  Main hepatic artery appears normal size, patent with normal spectral waveform  BILIARY: The gallbladder is normal in caliber  No wall thickening or pericholecystic fluid  Shadowing gallstone(s) identified  No sonographic King's sign  No intrahepatic biliary dilatation  CBD measures 6 0 mm  No choledocholithiasis  KIDNEY: Right kidney measures 10 8 x 5 9 x 5 6 cm  Volume 187 2 mL Kidney within normal limits  ASCITES:   None  Impression: Cholelithiasis without findings of acute cholecystitis  Workstation performed: [id number]     Echo follow up/limited w/ contrast if indicated    Result Date: 9/2/2022  Narrative: •  Left Ventricle: Left ventricular cavity size is normal  Wall thickness is normal  The left ventricular ejection fraction is 75%  Systolic function is normal  Wall motion is normal  Diastolic function is normal  •  Right Ventricle: Right ventricular cavity size is normal  Systolic function is normal  •  Atrial Septum: The septum bows into the right atrium, suggesting increased left atrial pressure  •  Aortic Valve: The aortic valve is trileaflet  The leaflets are not thickened  The leaflets are moderately calcified  The leaflets exhibit normal mobility  •  Mitral Valve: There is moderate regurgitation  •  Tricuspid Valve: There is mild to moderate regurgitation  TAVIA    Result Date: 9/9/2022  Narrative: •  Left Ventricle: Left ventricular cavity size is normal  Wall thickness is normal  There is no concentric hypertrophy   The left ventricular ejection fraction is 65%  Systolic function is normal  Wall motion is normal  •  Right Ventricle: Right ventricular cavity size is normal  Systolic function is normal  •  Atrial Septum: No patent foramen ovale detected using color flow Doppler at rest  •  Left Atrial Appendage: There is normal function  There is no thrombus  There is no mass  There is no spontaneous echo contrast  •  Aortic Valve: The aortic valve is trileaflet  The leaflets are moderately thickened  The leaflets are not calcified  The leaflets exhibit normal mobility  •  Mitral Valve: There is mild to moderate regurgitation  The effective regurgitant orifice area is 0 18 cm2  The regurgitant volume is 27 cm3  •  Tricuspid Valve: There is mild regurgitation  •  Aorta: There is a protruding atheroma  No evidence of vegetation on any valves  LILLIAN Figueroa    Gastroenterology Fellow  PGY-4  Available on [facility]  9/24/2022 11:10 AM

## 2022-09-24 NOTE — ASSESSMENT & PLAN NOTE
· Patient is mildly confused today on my exam  · ?  From prolonged hospital stay with  induced delirium vs metabolic encephalopathy  · No focal deficit on exam  · Delirium precautions

## 2022-09-25 NOTE — PROGRESS NOTES
Progress Note -  [facility]  Raymond Roland 76 y o  female MRN: [id number]  Unit/Bed#: [facility] 317-01 Encounter: [id number]      ASSESSMENT AND PLAN:      68-year-old female with history significant for bipolar disorder, depression, history of gastric bypass with malnutrition status post endoscopic PEJ tube placement on 09/07 presenting with 2 day history of diarrhea and persistent pain surrounding the PEJ site  She does not appear to have a buried bumper, however, PEJ number appear to be too tight against the skin  The retention disc was loosened to 2 5 cm from 1 5 cm yesterday  On exam, patient appears to be markedly less tender on palpation of the bumper  1  Okay to continue use to PEJ  2  Continue MiraLax b i d  to maintain regular BMs given imaging with significant stool burden  3  GI will sign off  Please call with questions or concerns  Rest of care per primary team     ______________________________________________________________________    Subjective:  Patient continues to endorse some pain around the PEJ site with intermittent chills  REVIEW OF SYSTEMS:  10 point ROS reviewed and negative except otherwise noted in the HPI above       Historical Information   Past Medical History:   Diagnosis Date   • Anemia    • Anxiety    • Bipolar disorder ([facility] [address] )    • Colon polyp    • Disease of thyroid gland    • Essential hypertension    • Essential tremor    • Fibromyalgia, primary    • GERD (gastroesophageal reflux disease)    • Inflammatory polyarthropathy (HCC)    • Mammogram abnormal    • Pressure injury of skin      Past Surgical History:   Procedure Laterality Date   • BREAST BIOPSY Right 2015    benign   • CARPAL TUNNEL RELEASE Bilateral    • COLONOSCOPY     • EGD AND COLONOSCOPY  01/01/2014   • GASTRIC BYPASS  07/01/2012   • MAMMO NEEDLE LOCALIZATION RIGHT (ALL INC) Right 2/6/2012   • MAMMO NEEDLE LOCALIZATION RIGHT (ALL INC) Right 2/6/2012   • ULNAR NERVE TRANSPOSITION Right      Social History   Social History     Substance and Sexual Activity   Alcohol Use Yes   • Alcohol/week: 4 0 standard drinks   • Types: 4 Glasses of wine per week    Comment: rare     Social History     Substance and Sexual Activity   Drug Use Never     Social History     Tobacco Use   Smoking Status Former Smoker   • Quit date:    • Years since quittin 7   Smokeless Tobacco Never Used     Family History   Problem Relation Age of Onset   • No Known Problems Mother    • No Known Problems Father    • No Known Problems Sister    • No Known Problems Maternal Grandmother    • No Known Problems Maternal Grandfather    • No Known Problems Paternal Grandmother    • No Known Problems Paternal Grandfather    • No Known Problems Sister    • No Known Problems Sister    • Stomach cancer Maternal Aunt    • No Known Problems Maternal Aunt    • No Known Problems Maternal Aunt    • No Known Problems Maternal Aunt    • No Known Problems Paternal Aunt    • Leukemia Other        Meds/Allergies     Medications Prior to Admission   Medication   • clonazePAM (KlonoPIN) 1 mg tablet   • folic acid (FOLVITE) 1 mg tablet   • levothyroxine 112 mcg tablet   • midodrine (PROAMATINE) 5 mg tablet   • sucralfate (CARAFATE) 1 g tablet   • thiamine (VITAMIN B1) 100 mg tablet   • traZODone (DESYREL) 50 mg tablet     Current Facility-Administered Medications   Medication Dose Route Frequency   • acetaminophen (TYLENOL) tablet 650 mg  650 mg Oral Q6H PRN   • ALPRAZolam (XANAX) tablet 0 25 mg  0 25 mg Oral TID PRN   • alteplase (CATHFLO) injection 2 mg  2 mg Intracatheter Once   • bisacodyl (DULCOLAX) rectal suppository 10 mg  10 mg Rectal Daily PRN   • bisacodyl (DULCOLAX) rectal suppository 10 mg  10 mg Rectal Once   • cefTRIAXone (ROCEPHIN) 1,000 mg in dextrose 5 % 50 mL IVPB  1,000 mg Intravenous Q24H   • clonazePAM (KlonoPIN) tablet 3 mg  3 mg Oral HS   • folic acid (FOLVITE) tablet 1 mg  1 mg Oral Daily   • heparin (porcine) subcutaneous injection 5,000 Units  5,000 Units Subcutaneous Q8H [facility]   • levothyroxine tablet 112 mcg  112 mcg Oral Early Morning   • magnesium hydroxide (MILK OF MAGNESIA) oral suspension 30 mL  30 mL Oral Daily PRN   • metoclopramide (REGLAN) injection 10 mg  10 mg Intravenous Q6H PRN   • midodrine (PROAMATINE) tablet 5 mg  5 mg Oral TID AC   • ondansetron (ZOFRAN) injection 4 mg  4 mg Intravenous Q6H PRN   • oxyCODONE (ROXICODONE) IR tablet 5 mg  5 mg Oral Q6H   • pantoprazole (PROTONIX) EC tablet 40 mg  40 mg Oral BID AC   • polyethylene glycol (MIRALAX) packet 17 g  17 g Per PEG Tube BID   • senna (SENOKOT) tablet 17 2 mg  2 tablet Oral HS   • sodium chloride 0 9 % infusion  100 mL/hr Intravenous Continuous   • sucralfate (CARAFATE) tablet 1 g  1 g Oral 4x Daily (AC & HS)   • thiamine tablet 100 mg  100 mg Oral Daily   • traZODone (DESYREL) tablet 150 mg  150 mg Oral HS   • vancomycin (VANCOCIN) oral solution 125 mg  125 mg Oral Q12H [facility]       No Known Allergies      Objective     Blood pressure 115/65, pulse 93, temperature 99 8 °F (37 7 °C), resp  rate 14, height 5' 4" (1 626 m), weight 48 3 kg (106 lb 7 7 oz), SpO2 96 %  Body mass index is 18 28 kg/m²  Intake/Output Summary (Last 24 hours) at 9/25/2022 0905  Last data filed at 9/25/2022 0300  Gross per 24 hour   Intake 1184 ml   Output 750 ml   Net 434 ml         PHYSICAL EXAM:    General:  Chronically ill-appearing  Eyes: +pallor  CV: RRR, no m/r/g appreciated  Resp: CTAB, normal chest rise  Abdominal: Soft, diffusely TTP, non-distended, +bowel sounds +LUQ PEJ w/o erythema or drainage  Extremities: Warm, no deformities, no edema  Skin: No rashes  Neuro: alert and oriented  Psych: Normal affect      Lab Results:   No results displayed because visit has over 200 results  Imaging Studies: I have personally reviewed pertinent imaging studies    EGD    Result Date: 9/15/2022  Narrative: [address] [place] [address] Endoscopy [address] [facility] [address] [phone] DATE OF SERVICE: 9/02/22 PHYSICIAN(S): Attending: Cee Duggan MD Fellow: Kp Sims DO INDICATION: Anemia, unspecified type POST-OP DIAGNOSIS: See the impression below  PREPROCEDURE: Informed consent was obtained for the procedure, including sedation  Risks of perforation, hemorrhage, adverse drug reaction and aspiration were discussed  The patient was placed in the left lateral decubitus position  Patient was explained about the risks and benefits of the procedure  Risks including but not limited to bleeding, infection, and perforation were explained in detail  Also explained about less than 100% sensitivity with the exam and other alternatives  DETAILS OF PROCEDURE: Patient was taken to the procedure room where a time out was performed to confirm correct patient and correct procedure  The patient underwent monitored anesthesia care, which was administered by an anesthesia professional  The patient's blood pressure, heart rate, level of consciousness, respirations and oxygen were monitored throughout the procedure  The scope was advanced to the second part of the duodenum  Retroflexion was performed in the fundus  The patient experienced no blood loss  The procedure was not difficult  The patient tolerated the procedure well  There were no apparent complications  ANESTHESIA INFORMATION: ASA: IV Anesthesia Type: IV Sedation with Anesthesia MEDICATIONS: No administrations occurring from 1541 to 1552 on 09/02/22 FINDINGS: The esophagus appeared normal  Large, cratered ulcer in the gastrojejunal anastomosis with flat pigmented spot ([place] IIC) The jejunum appeared normal  SPECIMENS: * No specimens in log *     Impression: Normal esophagus  Large [place] Class IIC marginal ulcer at the [address] anastomosis  No stigmata of active or recent bleeding Normal jejunum RECOMMENDATION: Return to floor resume diet    ATTENDING ATTESTATION:  I was present throughout the entire procedure from insertion to complete withdrawal of the scope  I performed all interventions myself or oversaw the fellow  Marco Rahman MD     EGD Peg Tube Placement    Result Date: 9/9/2022  Narrative: [address] [place] [address] [id number] [place] [address] [phone] DATE OF SERVICE: 9/08/22 PHYSICIAN(S): Attending: Marco Rahman MD Fellow: MD Nila Clay DO INDICATION: Moderate protein-calorie malnutrition ([address] ) POST-OP DIAGNOSIS: See the impression below  PREPROCEDURE: Informed consent was obtained for the procedure, including sedation  Risks of perforation, hemorrhage, adverse drug reaction and aspiration were discussed  The patient was placed in the left lateral decubitus position  Patient was explained about the risks and benefits of the procedure  Risks including but not limited to bleeding, infection, and perforation were explained in detail  Also explained about less than 100% sensitivity with the exam and other alternatives  DETAILS OF PROCEDURE: Patient was taken to the procedure room where a time out was performed to confirm correct patient and correct procedure  The patient underwent monitored anesthesia care, which was administered by an anesthesia professional  The patient's blood pressure, heart rate, level of consciousness, respirations, oxygen and ETCO2 were monitored throughout the procedure  The scope was introduced through the mouth and advanced to the jejunum  Retroflexion was performed in the fundus  Prior to the procedure, the patient's H  Pylori status was unknown  The patient experienced no blood loss  The procedure was not difficult  The patient tolerated the procedure well  There were no apparent complications  ANESTHESIA INFORMATION: ASA: III Anesthesia Type: IV Sedation with Anesthesia MEDICATIONS: No administrations occurring from 1538 to 1616 on 09/08/22 FINDINGS: Previous gastric bypass surgery   History of Savage-en-Y gastric bypass The esophagus, gastric pouch and jejunum appeared normal  PEJ tube successfully placed in the jejunum via the pull technique after the site was identified via transillumination, visualized indentation and needle passed through abdominal wall; distance from external bolster to external end of tube: 1 5 cm; scope reinserted and tube rotated freely to confirm placement  First incision made and local anaesthesia was administered throughout the tract  Unfortunately when trocar was passed through the tract it could not enter the jejunum due to small bowel peristalsis so a separate incision was made and the trocar was re-introduced and entered the small bowel  SPECIMENS: * No specimens in log *     Impression: Previous gastric bypass surgery  History of Savage-en-Y gastric bypass The esophagus, gastric pouch and jejunum appeared normal  PEJ tube successfully placed in the jejunum via the pull technique after the site was identified via transillumination, visualized indentation and needle passed through abdominal wall; distance from external bolster to external end of tube: 1 5 cm; scope reinserted and tube rotated freely to confirm placement  First incision made and local anaesthesia was administered throughout the tract  Unfortunately when trocar was passed through the tract it could not enter the jejunum due to small bowel peristalsis so a separate incision was made and the trocar was re-introduced and entered the small bowel  RECOMMENDATION: Hold tube feeds until tomorrow Ok to use PEJ for medications and water Return to floors  ATTENDING ATTESTATION:  I was present throughout the entire procedure from insertion to complete withdrawal of the scope  I performed all interventions myself or oversaw the fellow  Risa Mann MD     CT abdomen pelvis wo contrast    Result Date: 9/23/2022  Narrative: CT ABDOMEN AND PELVIS WITHOUT IV CONTRAST INDICATION:   Abdominal pain, acute, nonlocalized fever, abd pain   COMPARISON:  CT abdomen pelvis August 31, 2022 TECHNIQUE:  CT examination of the abdomen and pelvis was performed without intravenous contrast  Axial, sagittal, and coronal 2D reformatted images were created from the source data and submitted for interpretation  Radiation dose length product (DLP) for this visit:  322 74 mGy-cm   This examination, like all CT scans performed in the [facility], was performed utilizing techniques to minimize radiation dose exposure, including the use of iterative  reconstruction and automated exposure control  Enteric contrast was not administered  FINDINGS: ABDOMEN LOWER CHEST:  Trace bilateral pleural effusions with minimal passive atelectasis LIVER/BILIARY TREE:  Unremarkable  GALLBLADDER:  Two adjacent gallstones measuring 1 3 and 1 0 cm  Gallbladder lumen is mildly distended  SPLEEN:  Unremarkable  PANCREAS:  Unremarkable  ADRENAL GLANDS:  Unremarkable  KIDNEYS/URETERS:  Unremarkable  No hydronephrosis  STOMACH AND BOWEL:  Post Savage-en-Y gastric bypass  Percutaneous jejunostomy tube  No bowel obstruction  Large amount of stool throughout the colon  APPENDIX:  No findings to suggest appendicitis  ABDOMINOPELVIC CAVITY:  No ascites  No pneumoperitoneum  No lymphadenopathy  VESSELS:  Atherosclerotic changes are present  No evidence of aneurysm  PELVIS REPRODUCTIVE ORGANS:  Unremarkable for patient's age  URINARY BLADDER:  Unremarkable  ABDOMINAL WALL/INGUINAL REGIONS:  Unremarkable  OSSEOUS STRUCTURES:  No acute fracture or destructive osseous lesion  Spinal degenerative changes are noted  Impression: 1  Cholelithiasis with mild gallbladder luminal distention  Note is made of patient's elevated alkaline phosphatase  Ultrasound was recently performed (September 21, 2022), and correlation for clinical signs of acute cholecystitis is recommended  2   Large colonic stool burden without bowel obstruction  3   Trace bilateral pleural effusions   Workstation performed: [id number]     XR chest portable    Result Date: 9/23/2022  Narrative: CHEST INDICATION:   fever  COMPARISON:  CXR 9/6/2022 and 7/30/2022, abdomen CT 8/31/2022 and 5/24/2022  EXAM PERFORMED/VIEWS:  XR CHEST PORTABLE FINDINGS: Cardiomediastinal silhouette appears unremarkable  Increased interstitial markings, improving from 9/6/2022  Trace right effusion  No pneumothorax  Clips in the left upper quadrant  Osseous structures appear within normal limits for patient age  Impression: Increased interstitial markings, improved from 9/6/2022, question interstitial edema or infectious/inflammatory  Workstation performed: [id number]     XR chest portable    Result Date: 9/7/2022  Narrative: CHEST INDICATION:   Verify PICC placement  COMPARISON:  7/30/2022, CT chest 5/24/2022 EXAM PERFORMED/VIEWS:  XR CHEST PORTABLE Images: 2 (duplicate image re-windowed with line enhancement technique) FINDINGS: -Right-sided PICC tip projects at the right subclavian vein  Cardiomediastinal silhouette appears unremarkable  Mild bilateral peribronchial thickening, possibly related to chronic bronchitis  No acute infiltrates  Minimal linear atelectasis or scarring in the mid lungs bilaterally  No pneumothorax or pleural effusion  Degenerative changes of the spine and bilateral glenohumeral joints  Surgical clips left upper quadrant  Impression: Right-sided PICC tip projects at the right subclavian vein  Mild bilateral peribronchial thickening, possibly related to chronic bronchitis  No acute infiltrates  Minimal linear atelectasis or scarring in the mid lungs bilaterally  Workstation performed: [id number]     XR abdomen 1 view kub    Result Date: 9/15/2022  Narrative: ABDOMEN INDICATION:   abdominal pain, eval for etiology/constipation  COMPARISON:  8/31/2022 abdominopelvic CT VIEWS:  AP supine FINDINGS: No bowel dilatation, air-fluid levels or evidence of free air Significant colon stool, particularly the rectum   No pathologic calcifications or soft tissue masses  Left upper quadrant surgical clips  Visualized lung bases are clear  Osseous degenerative change and scoliosis  Impression: No acute findings  Significant colon stool, correlate clinically for constipation  Workstation performed: [id number]     CT abdomen pelvis with contrast    Result Date: 8/31/2022  Narrative: CT ABDOMEN AND PELVIS WITH IV CONTRAST INDICATION:   Abdominal cramping, diarrhea, fever, hx of gastric bypass  COMPARISON:  CT abdomen pelvis on 5/24/2022  TECHNIQUE:  CT examination of the abdomen and pelvis was performed  Axial, sagittal, and coronal 2D reformatted images were created from the source data and submitted for interpretation  Radiation dose length product (DLP) for this visit:  354 mGy-cm   This examination, like all CT scans performed in the [facility], was performed utilizing techniques to minimize radiation dose exposure, including the use of iterative reconstruction and automated exposure control  IV Contrast:  80 mL of iohexol (OMNIPAQUE) Enteric Contrast:  Enteric contrast was administered  FINDINGS: ABDOMEN LOWER CHEST:  Atelectatic changes are noted at the lung bases  LIVER/BILIARY TREE:  Unremarkable  GALLBLADDER:  There are gallstone(s) within the gallbladder, without pericholecystic inflammatory changes  SPLEEN:  Unremarkable  PANCREAS:  Unremarkable  ADRENAL GLANDS:  Unremarkable  KIDNEYS/URETERS:  Unremarkable  No hydronephrosis  STOMACH AND BOWEL:  Diffuse wall thickening of the colon, most prominent in the cecum, ascending colon and transverse colon  There is surrounding fat stranding  Status post gastric bypass  No bowel obstruction  APPENDIX:  No findings to suggest appendicitis  ABDOMINOPELVIC CAVITY:  No ascites  No pneumoperitoneum  No lymphadenopathy  VESSELS:  Atherosclerotic changes are present  No evidence of aneurysm  PELVIS REPRODUCTIVE ORGANS:  Unremarkable for patient's age   URINARY BLADDER:  Mild circumferential wall thickening  ABDOMINAL WALL/INGUINAL REGIONS:  Unremarkable  OSSEOUS STRUCTURES:  No acute fracture or destructive osseous lesion  Impression: 1  Diffuse wall thickening of the colon with surrounding fat stranding suspicious for colitis i e  infectious or inflammatory  2   Mild circumferential wall thickening of the urinary bladder  Correlate with urinalysis for possible cystitis  3   Cholelithiasis  The study was marked in [facility] for immediate notification  Workstation performed: [id number]     US right upper quadrant with liver dopplers    Result Date: 9/22/2022  Narrative: RIGHT UPPER QUADRANT ULTRASOUND WITH LIVER DOPPLER INDICATION:     Elevated LFT  COMPARISON:  Abdominal ultrasound August 1, 2022  Correlation with CT abdomen pelvis August 31, 2022 TECHNIQUE:   Real-time ultrasound of the right upper quadrant was performed with a curvilinear transducer with both volumetric sweeps and still imaging techniques  FINDINGS: PANCREAS:  Visualized portions of the pancreas are within normal limits  AORTA AND IVC:  Visualized portions are normal for patient age  LIVER: Size:  Within normal range  The liver measures 16 4 cm in the midclavicular line  Contour:  Surface contour is smooth  Parenchyma:  Echogenicity and echotexture are within normal limits  No liver mass identified  LIVER DOPPLER: The main portal vein and primary branch segments are patent and hepatopetal with normal spectral waveform  Hepatic veins are patent  Spectral waveforms within normal limits  Main hepatic artery appears normal size, patent with normal spectral waveform  BILIARY: The gallbladder is normal in caliber  No wall thickening or pericholecystic fluid  Shadowing gallstone(s) identified  No sonographic King's sign  No intrahepatic biliary dilatation  CBD measures 6 0 mm  No choledocholithiasis  KIDNEY: Right kidney measures 10 8 x 5 9 x 5 6 cm  Volume 187 2 mL Kidney within normal limits  ASCITES:   None  Impression: Cholelithiasis without findings of acute cholecystitis  Workstation performed: [id number]     Echo follow up/limited w/ contrast if indicated    Result Date: 9/2/2022  Narrative: •  Left Ventricle: Left ventricular cavity size is normal  Wall thickness is normal  The left ventricular ejection fraction is 75%  Systolic function is normal  Wall motion is normal  Diastolic function is normal  •  Right Ventricle: Right ventricular cavity size is normal  Systolic function is normal  •  Atrial Septum: The septum bows into the right atrium, suggesting increased left atrial pressure  •  Aortic Valve: The aortic valve is trileaflet  The leaflets are not thickened  The leaflets are moderately calcified  The leaflets exhibit normal mobility  •  Mitral Valve: There is moderate regurgitation  •  Tricuspid Valve: There is mild to moderate regurgitation  TAVIA    Result Date: 9/9/2022  Narrative: •  Left Ventricle: Left ventricular cavity size is normal  Wall thickness is normal  There is no concentric hypertrophy  The left ventricular ejection fraction is 65%  Systolic function is normal  Wall motion is normal  •  Right Ventricle: Right ventricular cavity size is normal  Systolic function is normal  •  Atrial Septum: No patent foramen ovale detected using color flow Doppler at rest  •  Left Atrial Appendage: There is normal function  There is no thrombus  There is no mass  There is no spontaneous echo contrast  •  Aortic Valve: The aortic valve is trileaflet  The leaflets are moderately thickened  The leaflets are not calcified  The leaflets exhibit normal mobility  •  Mitral Valve: There is mild to moderate regurgitation  The effective regurgitant orifice area is 0 18 cm2  The regurgitant volume is 27 cm3  •  Tricuspid Valve: There is mild regurgitation  •  Aorta: There is a protruding atheroma  No evidence of vegetation on any valves  LILLIAN Mistry    Gastroenterology Fellow  PGY-4  Available on [employer]  9/25/2022 9:06 AM

## 2022-09-25 NOTE — ASSESSMENT & PLAN NOTE
--Diarrhea on exam today which she was not aware she had  On C deff prophylaxis with po Vanco  Will repeat C deff and if positive while on po vanco, may need a change in regime  ?  From TF versus colitis recently  Will await C deff result

## 2022-09-25 NOTE — PLAN OF CARE
Problem: Potential for Falls  Goal: Patient will remain free of falls  Description: INTERVENTIONS:  - Educate patient/family on patient safety including physical limitations  - Instruct patient to call for assistance with activity   - Consult OT/PT to assist with strengthening/mobility   - Keep Call bell within reach  - Keep bed low and locked with side rails adjusted as appropriate  - Keep care items and personal belongings within reach  - Initiate and maintain comfort rounds  - Make Fall Risk Sign visible to staff  - Offer Toileting every  Hours, in advance of need  - Initiate/Maintain alarm  - Obtain necessary fall risk management equipment:   - Apply yellow socks and bracelet for high fall risk patients  - Consider moving patient to room near nurses station  Outcome: Progressing     Problem: MOBILITY - ADULT  Goal: Maintain or return to baseline ADL function  Description: INTERVENTIONS:  -  Assess patient's ability to carry out ADLs; assess patient's baseline for ADL function and identify physical deficits which impact ability to perform ADLs (bathing, care of mouth/teeth, toileting, grooming, dressing, etc )  - Assess/evaluate cause of self-care deficits   - Assess range of motion  - Assess patient's mobility; develop plan if impaired  - Assess patient's need for assistive devices and provide as appropriate  - Encourage maximum independence but intervene and supervise when necessary  - Involve family in performance of ADLs  - Assess for home care needs following discharge   - Consider OT consult to assist with ADL evaluation and planning for discharge  - Provide patient education as appropriate  Outcome: Progressing  Goal: Maintains/Returns to pre admission functional level  Description: INTERVENTIONS:  - Perform BMAT or MOVE assessment daily    - Set and communicate daily mobility goal to care team and patient/family/caregiver     - Collaborate with rehabilitation services on mobility goals if consulted  - Perform Range of Motion  times a day  - Reposition patient every  hours    - Dangle patient  times a day  - Stand patient  times a day  - Ambulate patient  times a day  - Out of bed to chair  times a day   - Out of bed for meals  times a day  - Out of bed for toileting  - Record patient progress and toleration of activity level   Outcome: Progressing     Problem: INFECTION - ADULT  Goal: Absence or prevention of progression during hospitalization  Description: INTERVENTIONS:  - Assess and monitor for signs and symptoms of infection  - Monitor lab/diagnostic results  - Monitor all insertion sites, i e  indwelling lines, tubes, and drains  - Monitor endotracheal if appropriate and nasal secretions for changes in amount and color  - [place] appropriate cooling/warming therapies per order  - Administer medications as ordered  - Instruct and encourage patient and family to use good hand hygiene technique  - Identify and instruct in appropriate isolation precautions for identified infection/condition  Outcome: Progressing  Goal: Absence of fever/infection during neutropenic period  Description: INTERVENTIONS:  - Monitor WBC    Outcome: Progressing     Problem: SAFETY ADULT  Goal: Patient will remain free of falls  Description: INTERVENTIONS:  - Educate patient/family on patient safety including physical limitations  - Instruct patient to call for assistance with activity   - Consult OT/PT to assist with strengthening/mobility   - Keep Call bell within reach  - Keep bed low and locked with side rails adjusted as appropriate  - Keep care items and personal belongings within reach  - Initiate and maintain comfort rounds  - Make Fall Risk Sign visible to staff  - Offer Toileting every  Hours, in advance of need  - Initiate/Maintain alarm  - Obtain necessary fall risk management equipment:   - Apply yellow socks and bracelet for high fall risk patients  - Consider moving patient to room near nurses station  Outcome: Progressing  Goal: Maintain or return to baseline ADL function  Description: INTERVENTIONS:  -  Assess patient's ability to carry out ADLs; assess patient's baseline for ADL function and identify physical deficits which impact ability to perform ADLs (bathing, care of mouth/teeth, toileting, grooming, dressing, etc )  - Assess/evaluate cause of self-care deficits   - Assess range of motion  - Assess patient's mobility; develop plan if impaired  - Assess patient's need for assistive devices and provide as appropriate  - Encourage maximum independence but intervene and supervise when necessary  - Involve family in performance of ADLs  - Assess for home care needs following discharge   - Consider OT consult to assist with ADL evaluation and planning for discharge  - Provide patient education as appropriate  Outcome: Progressing  Goal: Maintains/Returns to pre admission functional level  Description: INTERVENTIONS:  - Perform BMAT or MOVE assessment daily    - Set and communicate daily mobility goal to care team and patient/family/caregiver  - Collaborate with rehabilitation services on mobility goals if consulted  - Perform Range of Motion  times a day  - Reposition patient every  hours    - Dangle patient  times a day  - Stand patient  times a day  - Ambulate patient  times a day  - Out of bed to chair  times a day   - Out of bed for meals  times a day  - Out of bed for toileting  - Record patient progress and toleration of activity level   Outcome: Progressing     Problem: DISCHARGE PLANNING  Goal: Discharge to home or other facility with appropriate resources  Description: INTERVENTIONS:  - Identify barriers to discharge w/patient and caregiver  - Arrange for needed discharge resources and transportation as appropriate  - Identify discharge learning needs (meds, wound care, etc )  - Arrange for interpretive services to assist at discharge as needed  - Refer to Case Management Department for coordinating discharge planning if the patient needs post-hospital services based on physician/advanced practitioner order or complex needs related to functional status, cognitive ability, or social support system  Outcome: Progressing     Problem: Knowledge Deficit  Goal: Patient/family/caregiver demonstrates understanding of disease process, treatment plan, medications, and discharge instructions  Description: Complete learning assessment and assess knowledge base    Interventions:  - Provide teaching at level of understanding  - Provide teaching via preferred learning methods  Outcome: Progressing     Problem: SKIN/TISSUE INTEGRITY - ADULT  Goal: Skin Integrity remains intact(Skin Breakdown Prevention)  Description: Assess:  -Perform Gabe assessment every   -Clean and moisturize skin every   -Inspect skin when repositioning, toileting, and assisting with ADLS  -Assess under medical devices such as  every   -Assess extremities for adequate circulation and sensation     Bed Management:  -Have minimal linens on bed & keep smooth, unwrinkled  -Change linens as needed when moist or perspiring  -Avoid sitting or lying in one position for more than  hours while in bed  -Keep HOB at degrees     Toileting:  -Offer bedside commode  -Assess for incontinence every   -Use incontinent care products after each incontinent episode such as     Activity:  -Mobilize patient  times a day  -Encourage activity and walks on unit  -Encourage or provide ROM exercises   -Turn and reposition patient every  Hours  -Use appropriate equipment to lift or move patient in bed  -Instruct/ Assist with weight shifting every  when out of bed in chair  -Consider limitation of chair gely hour intervals    Skin Care:  -Avoid use of baby powder, tape, friction and shearing, hot water or constrictive clothing  -Relieve pressure over bony prominences using   -Do not massage red bony areas    Next Steps:  -Teach patient strategies to minimize risks such as    -Consider consults to  interdisciplinary teams such as   Outcome: Progressing  Goal: Incision(s), wounds(s) or drain site(s) healing without S/S of infection  Description: INTERVENTIONS  - Assess and document dressing, incision, wound bed, drain sites and surrounding tissue  - Provide patient and family education  - Perform skin care/dressing changes every   Outcome: Progressing  Goal: Pressure injury heals and does not worsen  Description: Interventions:  - Implement low air loss mattress or specialty surface (Criteria met)  - Apply silicone foam dressing  - Instruct/assist with weight shifting every  minutes when in chair   - Limit chair time to  hour intervals  - Use special pressure reducing interventions such as  when in chair   - Apply fecal or urinary incontinence containment device   - Perform passive or active ROM every   - Turn and reposition patient & offload bony prominences every  hours   - Utilize friction reducing device or surface for transfers   - Consider consults to  interdisciplinary teams such as   - Use incontinent care products after each incontinent episode such as   - Consider nutrition services referral as needed  Outcome: Progressing     Problem: Prexisting or High Potential for Compromised Skin Integrity  Goal: Skin integrity is maintained or improved  Description: INTERVENTIONS:  - Identify patients at risk for skin breakdown  - Assess and monitor skin integrity  - Assess and monitor nutrition and hydration status  - Monitor labs   - Assess for incontinence   - Turn and reposition patient  - Assist with mobility/ambulation  - Relieve pressure over bony prominences  - Avoid friction and shearing  - Provide appropriate hygiene as needed including keeping skin clean and dry  - Evaluate need for skin moisturizer/barrier cream  - Collaborate with interdisciplinary team   - Patient/family teaching  - Consider wound care consult   Outcome: Progressing     Problem: Nutrition/Hydration-ADULT  Goal: Nutrient/Hydration intake appropriate for improving, restoring or maintaining nutritional needs  Description: Monitor and assess patient's nutrition/hydration status for malnutrition  Collaborate with interdisciplinary team and initiate plan and interventions as ordered  Monitor patient's weight and dietary intake as ordered or per policy  Utilize nutrition screening tool and intervene as necessary  Determine patient's food preferences and provide high-protein, high-caloric foods as appropriate       INTERVENTIONS:  - Monitor oral intake, urinary output, labs, and treatment plans  - Assess nutrition and hydration status and recommend course of action  - Evaluate amount of meals eaten  - Assist patient with eating if necessary   - Allow adequate time for meals  - Recommend/ encourage appropriate diets, oral nutritional supplements, and vitamin/mineral supplements  - Order, calculate, and assess calorie counts as needed  - Recommend, monitor, and adjust tube feedings and TPN/PPN based on assessed needs  - Assess need for intravenous fluids  - Provide specific nutrition/hydration education as appropriate  - Include patient/family/caregiver in decisions related to nutrition  Outcome: Progressing

## 2022-09-25 NOTE — PROGRESS NOTES
[address] [facility]  Progress Note - Klaudia Serrato 1945, 76 y o  female MRN: [id number]  Unit/Bed#: [facility] 317-01 Encounter: [id number]  Primary Care Provider: Renee Jennings MD   Date and time admitted to hospital: 8/30/2022 11:49 PM    * Sepsis [facility])  Assessment & Plan  · Resolved- now with new fever today 9/23        Initial source was bacteremia, C diff colitis  ID was consulted  · + evidence of colitis on ct scan  Status post C diff panel; positive toxin PCR but negative EIA  On p o  Vancomycin through, Diarrhea resolved  · Positive recurrent bacteremia  Presumed secondary to PICC line with use of TPN  Prior history of recent MSSE  · Repeat blood cx neg after 5 days  · Discontinued presenting PICC  New PICC ordered (9/9) which was dc'ed after abx iv daptomycin completed  · Patient remained stable for few days however spiked fever 102- new labs along with blood cultures ordered  UA and chest x-ray, CT abdomen pelvis  Id reconsulted and discuss case with ID   · UA positive and on Rocephin and po vanco prophylaxis  · Awaiting blood cultures/urinary cultures from 09/24/2022      Bacteremia  Assessment & Plan  · Recent hx of staph epi bacteremia in 7/2022, presumed due to picc line  ·  recurrence 1/2 set staph epi, 2nd set no growth thus far  · Had been on IV vancomycin but discontinued due to fluctuating levels  Completed vancomycin to daptomycin courses 9/15  · S/p TAVIA (9/9) no evidence of vegetation  · Repeat blood cx neg x 5 days   · TPN discontinued  S/p PEG placement  · PICC line discontinued    New fever 9/23/22, pan culture and workup initiated, appreciate infectious disease input  Probably from new UTI  Urine cx is pending while on Rocephin      Diarrhea  Assessment & Plan  --Diarrhea on exam today which she was not aware she had  On C deff prophylaxis with po Vanco  Will repeat C deff and if positive while on po vanco, may need a change in regime  ?  From TF versus colitis recently  Will await C deff result     Acute encephalopathy  Assessment & Plan  · Patient is mildly confused today on my exam  · ? 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  · S/p recent EGD in 7/22 revealing: Residual marginal ulcer of the gastrojejunostomy anastomosis with improved size  · S/p EGD 9/2 revealing: Large, cratered ulcer in the gastrojejunal anastomosis   · Received PRBC transfusion x 1 this admission,  continue monitoring transfuse less than 7  · Cont PPI, BID, carafate    NOAH (acute kidney injury) ([facility] [address] )  Assessment & Plan  Cr renal function remains impaired but stable  Suspect volume depletion as main etiology   Continue gentle IV fluids  Avoid all possible nephrotoxins  Ambulatory dysfunction  Assessment & Plan  · Exacerbated by diarrhea/dehydration however patient is chronically malnourished  · S/p PT/OT evaluation; to return to SNF  · Encouraged participation with PT/OT      Goals of care, counseling/discussion  Assessment & Plan  · Palliative on board  Family meeting held with prior provider:  patient has had a change of heart and wants to be txt focused and proceed with PEG placement  She states that she realized that she is not ready to die  · 9/15:  Derryl Journey discussion held with patient regarding likely outcomes of either cardiac arrest or respiratory failure requiring intubation given her current clinical state    Patient states she wishes not to be DNR DNI with aggressive care      Moderate protein-calorie malnutrition ([facility] [address] )  Assessment & Plan  Malnutrition Findings:   Adult Malnutrition type: Chronic illness  Adult Degree of Malnutrition: Other severe protein calorie malnutrition  Malnutrition Characteristics: Weight loss, Muscle loss     On Jevity 1 2 - resume at lower max rate   Checked CMP and it shows hyponatremia of 130, low albumin but mildly elevated AST and A phos    360 Statement: Severe/Chronic malnutrition r/t condition, as evidenced by 4 8% wt loss x < 1 week (9/2/22: 125#, 9/5/22: 119#), and severe muscle mass depletion (temples/clavicle)  Treated with liberalized diet and nutrition supplements, possibly nutrition support pending goals of care    BMI Findings: Body mass index is 18 28 kg/m²  Hx of Savage-en Y gastric bypass, 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  Now off due to recurrent bacteremia  S/p PEG placement and on PO feeds  Cont nutritional supplements    H/O bariatric surgery - bypass  Assessment & Plan  · Patient with history of gastric bypass surgery  Has had significant malnutrition issues and anastomotic ulcerations  Seen by Bariatric Service during recent hospitalization at [facility] who recommended prolonged course of TPN for nutritional optimization and possible revision of surgery in the future  · Was planned to have appt with bariatric sx on 8/20 however had to be rescheduled  · Given recurrent bacteremia; s/p GI consult; recommended discontinuation of TPN  S/p PEG, TF initiated- nutrition adjusted rate of TF  · P O  Diet for pleasure feeding resumed  · To f/u with bariatric surgery      Transaminitis  Assessment & Plan  · NO RQU pain, USG with gallstones, no choelcystitis  · Trend LFTs- trending down   · CMP in am     Left upper quadrant abdominal pain  Assessment & Plan  Likely secondary to severe constipation, patient with small bowel movements today with significant improvement in pain, KUB suggestive of large volume stool burden,  Pain has subsided- ultrasound showed cholelithiasis without cholecystitis    Improved pain around PEG tube site  Gastroenterology recommendations appreciated, no signs of infection  Her retention disc was loosened to 2 5 cm from 1 5 cm yesterday  MiraLax b i d      Hyponatremia  Assessment & Plan  Na at 129 today from 132 yesterday  Will obtain urine electrolyte studies today  Renew her Nacl tab 1 gm TID  BMP in am   Adjust TF and PO feeds        VTE Pharmacologic Prophylaxis: VTE Score: 3 Moderate Risk (Score 3-4) - Pharmacological DVT Prophylaxis Ordered: heparin  Patient Centered Rounds: I performed bedside rounds with nursing staff today  Discussions with Specialists or Other Care Team Provider: Nurse    Education and Discussions with Family / Patient: Updated  (brother) at bedside  her brother and his wife  Time Spent for Care: 30 minutes  More than 50% of total time spent on counseling and coordination of care as described above  Current Length of Stay: 25 day(s)  Current Patient Status: Inpatient   Certification Statement: The patient will continue to require additional inpatient hospital stay due to SIRs from UTI  Discharge Plan: Anticipate discharge in 48-72 hrs to discharge location to be determined pending rehab evaluations  Code Status: Level 3 - DNAR and DNI    Subjective:   Complaint of feeling tired    Objective:     Vitals:   Temp (24hrs), Av 9 °F (37 2 °C), Min:97 6 °F (36 4 °C), Max:100 8 °F (38 2 °C)    Temp:  [97 6 °F (36 4 °C)-100 8 °F (38 2 °C)] 100 8 °F (38 2 °C)  HR:  [76-97] 97  Resp:  [14-17] 17  BP: ()/(44-65) 111/49  SpO2:  [94 %-97 %] 97 %  Body mass index is 18 28 kg/m²  Input and Output Summary (last 24 hours): Intake/Output Summary (Last 24 hours) at 2022 1558  Last data filed at 2022 1410  Gross per 24 hour   Intake 570 ml   Output 2050 ml   Net -1480 ml       Physical Exam:   Constitutional:       General: She is not in acute distress  HENT:      Head: Normocephalic and atraumatic  Eyes:      General: No scleral icterus  Pupils: Pupils are equal, round, and reactive to light  Cardiovascular:      Rate and Rhythm: Normal rate and regular rhythm  Pulses: Normal pulses        Heart sounds: No murmur heard   Pulmonary:      Effort: Pulmonary effort is normal  No respiratory distress  Breath sounds: No wheezing  Abdominal:      General: Bowel sounds are normal  There is no distension  Tenderness: LUQ which she said is improved but not completely resolved     Comments: No erythema or signs of infection around PEG tube site   Musculoskeletal:         General: No swelling or deformity  Skin:     General: Skin is warm  Capillary Refill: Capillary refill takes less than 2 seconds  Coloration: Skin is not jaundiced  Neurological:      General: No focal deficit present  Mental Status: She is alert  Mental status is at baseline  Psychiatric:         Mood and Affect: Mood normal          Behavior: Behavior normal      Additional Data:     Labs:  Results from last 7 days   Lab Units 09/25/22  1001   WBC Thousand/uL 18 02*   HEMOGLOBIN g/dL 9 3*   HEMATOCRIT % 29 6*   PLATELETS Thousands/uL 482*   NEUTROS PCT % 77*   LYMPHS PCT % 10*   MONOS PCT % 5   EOS PCT % 7*     Results from last 7 days   Lab Units 09/25/22  0812 09/24/22  1746 09/23/22  2244   SODIUM mmol/L 129*   < > 128*   POTASSIUM mmol/L 5 1   < > 5 3   CHLORIDE mmol/L 103   < > 99   CO2 mmol/L 22   < > 24   BUN mg/dL 27*   < > 29*   CREATININE mg/dL 1 32*   < > 1 54*   ANION GAP mmol/L 4   < > 5   CALCIUM mg/dL 8 2*   < > 8 3   ALBUMIN g/dL  --   --  1 9*   TOTAL BILIRUBIN mg/dL  --   --  0 21   ALK PHOS U/L  --   --  211*   ALT U/L  --   --  53   AST U/L  --   --  63*   GLUCOSE RANDOM mg/dL 116   < > 115    < > = values in this interval not displayed           Results from last 7 days   Lab Units 09/25/22  1144 09/25/22  0008 09/24/22  1805 09/24/22  0538 09/23/22  2345 09/23/22  1658 09/23/22  1122 09/23/22  0624 09/23/22  0533 09/23/22  0131 09/22/22  2115 09/22/22  1325   POC GLUCOSE mg/dl 148* 144* 109 124 124 111 141* 99 108 114 125 85         Results from last 7 days   Lab Units 09/23/22  1601   LACTIC ACID mmol/L 1 4 Lines/Drains:  Invasive Devices  Report    Peripheral Intravenous Line  Duration           Peripheral IV 09/24/22 Proximal;Right;Ventral (anterior) Forearm <1 day          Drain  Duration           External Urinary Catheter 50 days    Gastrostomy/Enterostomy Percutaneous Endoscopic Gastrostomy (PEG) 20 Fr  LUQ 16 days                      Imaging: No pertinent imaging reviewed  Recent Cultures (last 7 days):   Results from last 7 days   Lab Units 09/23/22  1601 09/23/22  1558   BLOOD CULTURE   --  No Growth at 24 hrs  No Growth at 24 hrs     URINE CULTURE  >100,000 cfu/ml Proteus species*  --        Last 24 Hours Medication List:   Current Facility-Administered Medications   Medication Dose Route Frequency Provider Last Rate   • acetaminophen  650 mg Oral Q6H PRN Jovita Mcdermott, DO     • ALPRAZolam  0 25 mg Oral TID PRN Sangeetha Huddleston DO     • alteplase  2 mg Intracatheter Once Jeny Leal PA-C     • bisacodyl  10 mg Rectal Daily PRN Blane Darden MD     • bisacodyl  10 mg Rectal Once Lissette Arellano MD     • cefTRIAXone  1,000 mg Intravenous Q24H Lotachukwu Collis Alpers, MD 1,000 mg (09/24/22 1100)   • clonazePAM  3 mg Oral HS Jovita Mcdermott, DO     • folic acid  1 mg Oral Daily Jovita Mcdermott DO     • heparin (porcine)  5,000 Units Subcutaneous [facility] Sangeetha Huddleston, DO     • levothyroxine  112 mcg Oral Early Morning Jovitakash Mcdermott DO     • magnesium hydroxide  30 mL Oral Daily PRN Blane Darden MD     • metoclopramide  10 mg Intravenous Q6H PRN Blane Darden MD     • midodrine  5 mg Oral TID AC Jovita Mcdermott DO     • ondansetron  4 mg Intravenous Q6H PRN Remy Angela DO     • oxyCODONE  5 mg Oral Q6H Blane Darden MD     • pantoprazole  40 mg Oral BID AC Kaylan Arroyo DO     • polyethylene glycol  17 g Per PEG Tube BID Remy Angela DO     • senna  2 tablet Oral HS Josephine Curiel MD     • sodium chloride  75 mL/hr Intravenous Continuous Lotachukwu Collis Alpers, MD     • sodium chloride  1 g Oral TID With Meals Tiffany Camargo MD     • sucralfate  1 g Oral 4x Daily (AC & HS) Yann Thompson, DO     • thiamine  100 mg Oral Daily Jovitaaixa Mcdermott DO     • traZODone  150 mg Oral HS Jovita Mcdermott, DO     • vancomycin  125 mg Oral Q12H Shelby Thurman MD          Today, Patient Was Seen By: Austin Head MD    **Please Note: This note may have been constructed using a voice recognition system  **

## 2022-09-25 NOTE — PROGRESS NOTES
Progress Note - Infectious Disease   Spring Iverson 76 y o  female MRN: [id number]  Unit/Bed#: [facility] 317-01 Encounter: [id number]      Impression/Recommendations:  1  SIRS syndrome and possible sepsis, recurrent   Isolated fever along with elevation in white count 9/23   Noted to have eosinophils on differential   [place] UTI given dysuria, pyuria on UA  Urine cultures pending    no other appreciable source  Blood cultures negative  Recent RUQ U/S in CT with cholelithiasis but no clinical evidence of cholecystitis  Continue ceftriaxone for now  Follow-up urinecultures  Continue to trend fever curve/vitals  Follow up extremity and lower extremity Dopplers  Additional supportive care as per primary  Additional interventions pending clinical course     2  Proteus UTI   UA with some pyuria and patient reporting dysuria  Urine susceptibilities pending  Antibiotic as above for now  Monitor for ongoing symptoms  Continue to trend fever curve/WBC  Follow-up pending urine cultures     3  Abnormal abdominal imaging   Patient noted to have some abnormal changes to the gallbladder on imaging   Recent ultrasound unremarkable   Alkaline phosphatase borderline   Patient only localizes discomfort near the PEG tube, adjusted by GI  Monitor abdominal exam     4  Recent C diff     Continue C diff prophylaxis while on IV antibiotics     5  Progressing anemia and recent GI bleed   Hemoglobin relatively stable   Patient denies any reports of significant diarrhea or bloody stool   Continue to monitor      6  Elevated serum creatinine   Patient's creatinine has essentially remained elevated since earlier in admission   Uncertain if ongoing drug reaction as above   Could also be intake related  Consider Nephrology evaluation  Fluid hydration as per primary  Continue enteral feeding  Consider review of medications     Dr Mignon Mccarty will reassess the patient Monday 9/26      Antibiotics:  Ceftriaxone #3  PO vancomycin #3    Subjective:  Patient seen on PM rounds  Feels "terrible" but can't really specify what is bothering her  Endorsed chronic pain  Had dysuria  Noted to have purewic in place but thinks pain more internal   Denies fevers, chills, sweats, nausea, vomiting, or diarrhea  24 Hour Events:  No documented fevers, chills, sweats, nausea, vomiting, or diarrhea  WBC trending down  Objective:  Vitals:  Temp:  [97 6 °F (36 4 °C)-99 8 °F (37 7 °C)] 99 8 °F (37 7 °C)  HR:  [76-93] 93  Resp:  [14-16] 14  BP: ()/(44-65) 115/65  SpO2:  [94 %-96 %] 96 %  Temp (24hrs), Av 6 °F (37 °C), Min:97 6 °F (36 4 °C), Max:99 8 °F (37 7 °C)  Current: Temperature: 99 8 °F (37 7 °C)    Physical Exam:   General:  No acute distress  Psychiatric:  Awake and alert  Pulmonary:  Normal respiratory excursion without accessory muscle use  Abdomen:  Soft, tender at PEG  Extremities:  No edema  Skin:  No rashes    Lab Results:  I have personally reviewed pertinent labs  Results from last 7 days   Lab Units 22  0812 22  1746 22  2244 22  1601 22  1218   POTASSIUM mmol/L 5 1 4 8 5 3 5 4* 4 8   CHLORIDE mmol/L 103 100 99 98 101   CO2 mmol/L 22 25 24 24 26   BUN mg/dL 27* 28* 29* 28* 24   CREATININE mg/dL 1 32* 1 45* 1 54* 1 52* 1 37*   EGFR ml/min/1 73sq m 38 34 31 32 36   CALCIUM mg/dL 8 2* 8 0* 8 3 8 7 8 5   AST U/L  --   --  63* 84* 41   ALT U/L  --   --  53 61 39   ALK PHOS U/L  --   --  211* 228* 177*     Results from last 7 days   Lab Units 22  1001 22  1746 22  1601   WBC Thousand/uL 18 02* 24 02* 14 24*   HEMOGLOBIN g/dL 9 3* 6 2* 7 4*   PLATELETS Thousands/uL 482* 492* 579*     Results from last 7 days   Lab Units 22  1601 22  1558   BLOOD CULTURE   --  No Growth at 24 hrs  No Growth at 24 hrs  URINE CULTURE  >100,000 cfu/ml Proteus species*  --        Imaging Studies:   I have personally reviewed pertinent imaging study reports and images in PACS      EKG, Pathology, and Other Studies:   I have personally reviewed pertinent reports

## 2022-09-26 NOTE — ASSESSMENT & PLAN NOTE
Na at 129   Will obtain urine electrolyte studies today  Nacl tab 1 gm TID  BMP in am   Adjust TF and PO feeds

## 2022-09-26 NOTE — PLAN OF CARE
Problem: Potential for Falls  Goal: Patient will remain free of falls  Description: INTERVENTIONS:  - Educate patient/family on patient safety including physical limitations  - Instruct patient to call for assistance with activity   - Consult OT/PT to assist with strengthening/mobility   - Keep Call bell within reach  - Keep bed low and locked with side rails adjusted as appropriate  - Keep care items and personal belongings within reach  - Initiate and maintain comfort rounds  - Make Fall Risk Sign visible to staff  - Offer Toileting every *** Hours, in advance of need  - Initiate/Maintain ***alarm  - Obtain necessary fall risk management equipment: ***  - Apply yellow socks and bracelet for high fall risk patients  - Consider moving patient to room near nurses station  Outcome: Progressing     Problem: MOBILITY - ADULT  Goal: Maintain or return to baseline ADL function  Description: INTERVENTIONS:  -  Assess patient's ability to carry out ADLs; assess patient's baseline for ADL function and identify physical deficits which impact ability to perform ADLs (bathing, care of mouth/teeth, toileting, grooming, dressing, etc )  - Assess/evaluate cause of self-care deficits   - Assess range of motion  - Assess patient's mobility; develop plan if impaired  - Assess patient's need for assistive devices and provide as appropriate  - Encourage maximum independence but intervene and supervise when necessary  - Involve family in performance of ADLs  - Assess for home care needs following discharge   - Consider OT consult to assist with ADL evaluation and planning for discharge  - Provide patient education as appropriate  Outcome: Progressing  Goal: Maintains/Returns to pre admission functional level  Description: INTERVENTIONS:  - Perform BMAT or MOVE assessment daily    - Set and communicate daily mobility goal to care team and patient/family/caregiver     - Collaborate with rehabilitation services on mobility goals if consulted  - Perform Range of Motion *** times a day  - Reposition patient every *** hours    - Dangle patient *** times a day  - Stand patient *** times a day  - Ambulate patient *** times a day  - Out of bed to chair *** times a day   - Out of bed for meals *** times a day  - Out of bed for toileting  - Record patient progress and toleration of activity level   Outcome: Progressing     Problem: INFECTION - ADULT  Goal: Absence or prevention of progression during hospitalization  Description: INTERVENTIONS:  - Assess and monitor for signs and symptoms of infection  - Monitor lab/diagnostic results  - Monitor all insertion sites, i e  indwelling lines, tubes, and drains  - Monitor endotracheal if appropriate and nasal secretions for changes in amount and color  - [place] appropriate cooling/warming therapies per order  - Administer medications as ordered  - Instruct and encourage patient and family to use good hand hygiene technique  - Identify and instruct in appropriate isolation precautions for identified infection/condition  Outcome: Progressing  Goal: Absence of fever/infection during neutropenic period  Description: INTERVENTIONS:  - Monitor WBC    Outcome: Progressing     Problem: SAFETY ADULT  Goal: Patient will remain free of falls  Description: INTERVENTIONS:  - Educate patient/family on patient safety including physical limitations  - Instruct patient to call for assistance with activity   - Consult OT/PT to assist with strengthening/mobility   - Keep Call bell within reach  - Keep bed low and locked with side rails adjusted as appropriate  - Keep care items and personal belongings within reach  - Initiate and maintain comfort rounds  - Make Fall Risk Sign visible to staff  - Offer Toileting every *** Hours, in advance of need  - Initiate/Maintain ***alarm  - Obtain necessary fall risk management equipment: ***  - Apply yellow socks and bracelet for high fall risk patients  - Consider moving patient to room near nurses station  Outcome: Progressing  Goal: Maintain or return to baseline ADL function  Description: INTERVENTIONS:  -  Assess patient's ability to carry out ADLs; assess patient's baseline for ADL function and identify physical deficits which impact ability to perform ADLs (bathing, care of mouth/teeth, toileting, grooming, dressing, etc )  - Assess/evaluate cause of self-care deficits   - Assess range of motion  - Assess patient's mobility; develop plan if impaired  - Assess patient's need for assistive devices and provide as appropriate  - Encourage maximum independence but intervene and supervise when necessary  - Involve family in performance of ADLs  - Assess for home care needs following discharge   - Consider OT consult to assist with ADL evaluation and planning for discharge  - Provide patient education as appropriate  Outcome: Progressing  Goal: Maintains/Returns to pre admission functional level  Description: INTERVENTIONS:  - Perform BMAT or MOVE assessment daily    - Set and communicate daily mobility goal to care team and patient/family/caregiver  - Collaborate with rehabilitation services on mobility goals if consulted  - Perform Range of Motion *** times a day  - Reposition patient every *** hours    - Dangle patient *** times a day  - Stand patient *** times a day  - Ambulate patient *** times a day  - Out of bed to chair *** times a day   - Out of bed for meals *** times a day  - Out of bed for toileting  - Record patient progress and toleration of activity level   Outcome: Progressing     Problem: DISCHARGE PLANNING  Goal: Discharge to home or other facility with appropriate resources  Description: INTERVENTIONS:  - Identify barriers to discharge w/patient and caregiver  - Arrange for needed discharge resources and transportation as appropriate  - Identify discharge learning needs (meds, wound care, etc )  - Arrange for interpretive services to assist at discharge as needed  - Refer to Case Management Department for coordinating discharge planning if the patient needs post-hospital services based on physician/advanced practitioner order or complex needs related to functional status, cognitive ability, or social support system  Outcome: Progressing     Problem: Knowledge Deficit  Goal: Patient/family/caregiver demonstrates understanding of disease process, treatment plan, medications, and discharge instructions  Description: Complete learning assessment and assess knowledge base    Interventions:  - Provide teaching at level of understanding  - Provide teaching via preferred learning methods  Outcome: Progressing     Problem: SKIN/TISSUE INTEGRITY - ADULT  Goal: Skin Integrity remains intact(Skin Breakdown Prevention)  Description: Assess:  -Perform Gabe assessment every ***  -Clean and moisturize skin every ***  -Inspect skin when repositioning, toileting, and assisting with ADLS  -Assess under medical devices such as *** every ***  -Assess extremities for adequate circulation and sensation     Bed Management:  -Have minimal linens on bed & keep smooth, unwrinkled  -Change linens as needed when moist or perspiring  -Avoid sitting or lying in one position for more than *** hours while in bed  -Keep HOB at ***degrees     Toileting:  -Offer bedside commode  -Assess for incontinence every ***  -Use incontinent care products after each incontinent episode such as ***    Activity:  -Mobilize patient *** times a day  -Encourage activity and walks on unit  -Encourage or provide ROM exercises   -Turn and reposition patient every *** Hours  -Use appropriate equipment to lift or move patient in bed  -Instruct/ Assist with weight shifting every *** when out of bed in chair  -Consider limitation of chair time *** hour intervals    Skin Care:  -Avoid use of baby powder, tape, friction and shearing, hot water or constrictive clothing  -Relieve pressure over bony prominences using ***  -Do not massage red bony areas    Next Steps:  -Teach patient strategies to minimize risks such as ***   -Consider consults to  interdisciplinary teams such as ***  Outcome: Progressing  Goal: Incision(s), wounds(s) or drain site(s) healing without S/S of infection  Description: INTERVENTIONS  - Assess and document dressing, incision, wound bed, drain sites and surrounding tissue  - Provide patient and family education  - Perform skin care/dressing changes every ***  Outcome: Progressing  Goal: Pressure injury heals and does not worsen  Description: Interventions:  - Implement low air loss mattress or specialty surface (Criteria met)  - Apply silicone foam dressing  - Instruct/assist with weight shifting every *** minutes when in chair   - Limit chair time to *** hour intervals  - Use special pressure reducing interventions such as *** when in chair   - Apply fecal or urinary incontinence containment device   - Perform passive or active ROM every ***  - Turn and reposition patient & offload bony prominences every *** hours   - Utilize friction reducing device or surface for transfers   - Consider consults to  interdisciplinary teams such as ***  - Use incontinent care products after each incontinent episode such as ***  - Consider nutrition services referral as needed  Outcome: Progressing     Problem: Prexisting or High Potential for Compromised Skin Integrity  Goal: Skin integrity is maintained or improved  Description: INTERVENTIONS:  - Identify patients at risk for skin breakdown  - Assess and monitor skin integrity  - Assess and monitor nutrition and hydration status  - Monitor labs   - Assess for incontinence   - Turn and reposition patient  - Assist with mobility/ambulation  - Relieve pressure over bony prominences  - Avoid friction and shearing  - Provide appropriate hygiene as needed including keeping skin clean and dry  - Evaluate need for skin moisturizer/barrier cream  - Collaborate with interdisciplinary team   - Patient/family teaching  - Consider wound care consult   Outcome: Progressing     Problem: Nutrition/Hydration-ADULT  Goal: Nutrient/Hydration intake appropriate for improving, restoring or maintaining nutritional needs  Description: Monitor and assess patient's nutrition/hydration status for malnutrition  Collaborate with interdisciplinary team and initiate plan and interventions as ordered  Monitor patient's weight and dietary intake as ordered or per policy  Utilize nutrition screening tool and intervene as necessary  Determine patient's food preferences and provide high-protein, high-caloric foods as appropriate       INTERVENTIONS:  - Monitor oral intake, urinary output, labs, and treatment plans  - Assess nutrition and hydration status and recommend course of action  - Evaluate amount of meals eaten  - Assist patient with eating if necessary   - Allow adequate time for meals  - Recommend/ encourage appropriate diets, oral nutritional supplements, and vitamin/mineral supplements  - Order, calculate, and assess calorie counts as needed  - Recommend, monitor, and adjust tube feedings and TPN/PPN based on assessed needs  - Assess need for intravenous fluids  - Provide specific nutrition/hydration education as appropriate  - Include patient/family/caregiver in decisions related to nutrition  Outcome: Progressing

## 2022-09-26 NOTE — PLAN OF CARE
Problem: Potential for Falls  Goal: Patient will remain free of falls  Description: INTERVENTIONS:  - Educate patient/family on patient safety including physical limitations  - Instruct patient to call for assistance with activity   - Consult OT/PT to assist with strengthening/mobility   - Keep Call bell within reach  - Keep bed low and locked with side rails adjusted as appropriate  - Keep care items and personal belongings within reach  - Initiate and maintain comfort rounds  - Make Fall Risk Sign visible to staff  - Offer Toileting every  Hours, in advance of need  - Initiate/Maintain alarm  - Obtain necessary fall risk management equipment:   - Apply yellow socks and bracelet for high fall risk patients  - Consider moving patient to room near nurses station  Outcome: Not Progressing     Problem: MOBILITY - ADULT  Goal: Maintain or return to baseline ADL function  Description: INTERVENTIONS:  -  Assess patient's ability to carry out ADLs; assess patient's baseline for ADL function and identify physical deficits which impact ability to perform ADLs (bathing, care of mouth/teeth, toileting, grooming, dressing, etc )  - Assess/evaluate cause of self-care deficits   - Assess range of motion  - Assess patient's mobility; develop plan if impaired  - Assess patient's need for assistive devices and provide as appropriate  - Encourage maximum independence but intervene and supervise when necessary  - Involve family in performance of ADLs  - Assess for home care needs following discharge   - Consider OT consult to assist with ADL evaluation and planning for discharge  - Provide patient education as appropriate  Outcome: Not Progressing  Goal: Maintains/Returns to pre admission functional level  Description: INTERVENTIONS:  - Perform BMAT or MOVE assessment daily    - Set and communicate daily mobility goal to care team and patient/family/caregiver     - Collaborate with rehabilitation services on mobility goals if consulted  - Perform Range of Motion  times a day  - Reposition patient every  hours    - Dangle patient  times a day  - Stand patient  times a day  - Ambulate patient  times a day  - Out of bed to chair  times a day   - Out of bed for meals  times a day  - Out of bed for toileting  - Record patient progress and toleration of activity level   Outcome: Not Progressing     Problem: INFECTION - ADULT  Goal: Absence or prevention of progression during hospitalization  Description: INTERVENTIONS:  - Assess and monitor for signs and symptoms of infection  - Monitor lab/diagnostic results  - Monitor all insertion sites, i e  indwelling lines, tubes, and drains  - Monitor endotracheal if appropriate and nasal secretions for changes in amount and color  - [place] appropriate cooling/warming therapies per order  - Administer medications as ordered  - Instruct and encourage patient and family to use good hand hygiene technique  - Identify and instruct in appropriate isolation precautions for identified infection/condition  Outcome: Not Progressing  Goal: Absence of fever/infection during neutropenic period  Description: INTERVENTIONS:  - Monitor WBC    Outcome: Not Progressing     Problem: SAFETY ADULT  Goal: Patient will remain free of falls  Description: INTERVENTIONS:  - Educate patient/family on patient safety including physical limitations  - Instruct patient to call for assistance with activity   - Consult OT/PT to assist with strengthening/mobility   - Keep Call bell within reach  - Keep bed low and locked with side rails adjusted as appropriate  - Keep care items and personal belongings within reach  - Initiate and maintain comfort rounds  - Make Fall Risk Sign visible to staff  - Offer Toileting every  Hours, in advance of need  - Initiate/Maintain alarm  - Obtain necessary fall risk management equipment:   - Apply yellow socks and bracelet for high fall risk patients  - Consider moving patient to room near nurses station  Outcome: Not Progressing  Goal: Maintain or return to baseline ADL function  Description: INTERVENTIONS:  -  Assess patient's ability to carry out ADLs; assess patient's baseline for ADL function and identify physical deficits which impact ability to perform ADLs (bathing, care of mouth/teeth, toileting, grooming, dressing, etc )  - Assess/evaluate cause of self-care deficits   - Assess range of motion  - Assess patient's mobility; develop plan if impaired  - Assess patient's need for assistive devices and provide as appropriate  - Encourage maximum independence but intervene and supervise when necessary  - Involve family in performance of ADLs  - Assess for home care needs following discharge   - Consider OT consult to assist with ADL evaluation and planning for discharge  - Provide patient education as appropriate  Outcome: Not Progressing  Goal: Maintains/Returns to pre admission functional level  Description: INTERVENTIONS:  - Perform BMAT or MOVE assessment daily    - Set and communicate daily mobility goal to care team and patient/family/caregiver  - Collaborate with rehabilitation services on mobility goals if consulted  - Perform Range of Motion  times a day  - Reposition patient every  hours    - Dangle patient  times a day  - Stand patient  times a day  - Ambulate patient  times a day  - Out of bed to chair  times a day   - Out of bed for meals  times a day  - Out of bed for toileting  - Record patient progress and toleration of activity level   Outcome: Not Progressing     Problem: DISCHARGE PLANNING  Goal: Discharge to home or other facility with appropriate resources  Description: INTERVENTIONS:  - Identify barriers to discharge w/patient and caregiver  - Arrange for needed discharge resources and transportation as appropriate  - Identify discharge learning needs (meds, wound care, etc )  - Arrange for interpretive services to assist at discharge as needed  - Refer to Case Management Department for coordinating discharge planning if the patient needs post-hospital services based on physician/advanced practitioner order or complex needs related to functional status, cognitive ability, or social support system  Outcome: Not Progressing     Problem: Knowledge Deficit  Goal: Patient/family/caregiver demonstrates understanding of disease process, treatment plan, medications, and discharge instructions  Description: Complete learning assessment and assess knowledge base    Interventions:  - Provide teaching at level of understanding  - Provide teaching via preferred learning methods  Outcome: Not Progressing     Problem: SKIN/TISSUE INTEGRITY - ADULT  Goal: Skin Integrity remains intact(Skin Breakdown Prevention)  Description: Assess:  -Perform Gabe assessment every   -Clean and moisturize skin every   -Inspect skin when repositioning, toileting, and assisting with ADLS  -Assess under medical devices such as  every   -Assess extremities for adequate circulation and sensation     Bed Management:  -Have minimal linens on bed & keep smooth, unwrinkled  -Change linens as needed when moist or perspiring  -Avoid sitting or lying in one position for more than  hours while in bed  -Keep HOB at degrees     Toileting:  -Offer bedside commode  -Assess for incontinence every   -Use incontinent care products after each incontinent episode such as     Activity:  -Mobilize patient times a day  -Encourage activity and walks on unit  -Encourage or provide ROM exercises   -Turn and reposition patient every  Hours  -Use appropriate equipment to lift or move patient in bed  -Instruct/ Assist with weight shifting every  when out of bed in chair  -Consider limitation of chair time  hour intervals    Skin Care:  -Avoid use of baby powder, tape, friction and shearing, hot water or constrictive clothing  -Relieve pressure over bony prominences using   -Do not massage red bony areas    Next Steps:  -Teach patient strategies to minimize risks such as    -Consider consults to  interdisciplinary teams such as   Outcome: Not Progressing  Goal: Incision(s), wounds(s) or drain site(s) healing without S/S of infection  Description: INTERVENTIONS  - Assess and document dressing, incision, wound bed, drain sites and surrounding tissue  - Provide patient and family education  - Perform skin care/dressing changes every   Outcome: Not Progressing  Goal: Pressure injury heals and does not worsen  Description: Interventions:  - Implement low air loss mattress or specialty surface (Criteria met)  - Apply silicone foam dressing  - Instruct/assist with weight shifting every  minutes when in chair   - Limit chair time to  hour intervals  - Use special pressure reducing interventions such as  when in chair   - Apply fecal or urinary incontinence containment device   - Perform passive or active ROM every   - Turn and reposition patient & offload bony prominences every  hours   - Utilize friction reducing device or surface for transfers   - Consider consults to  interdisciplinary teams such as   - Use incontinent care products after each incontinent episode such as  - Consider nutrition services referral as needed  Outcome: Not Progressing     Problem: Prexisting or High Potential for Compromised Skin Integrity  Goal: Skin integrity is maintained or improved  Description: INTERVENTIONS:  - Identify patients at risk for skin breakdown  - Assess and monitor skin integrity  - Assess and monitor nutrition and hydration status  - Monitor labs   - Assess for incontinence   - Turn and reposition patient  - Assist with mobility/ambulation  - Relieve pressure over bony prominences  - Avoid friction and shearing  - Provide appropriate hygiene as needed including keeping skin clean and dry  - Evaluate need for skin moisturizer/barrier cream  - Collaborate with interdisciplinary team   - Patient/family teaching  - Consider wound care consult   Outcome: Not Progressing     Problem: Nutrition/Hydration-ADULT  Goal: Nutrient/Hydration intake appropriate for improving, restoring or maintaining nutritional needs  Description: Monitor and assess patient's nutrition/hydration status for malnutrition  Collaborate with interdisciplinary team and initiate plan and interventions as ordered  Monitor patient's weight and dietary intake as ordered or per policy  Utilize nutrition screening tool and intervene as necessary  Determine patient's food preferences and provide high-protein, high-caloric foods as appropriate       INTERVENTIONS:  - Monitor oral intake, urinary output, labs, and treatment plans  - Assess nutrition and hydration status and recommend course of action  - Evaluate amount of meals eaten  - Assist patient with eating if necessary   - Allow adequate time for meals  - Recommend/ encourage appropriate diets, oral nutritional supplements, and vitamin/mineral supplements  - Order, calculate, and assess calorie counts as needed  - Recommend, monitor, and adjust tube feedings and TPN/PPN based on assessed needs  - Assess need for intravenous fluids  - Provide specific nutrition/hydration education as appropriate  - Include patient/family/caregiver in decisions related to nutrition  Outcome: Not Progressing

## 2022-09-26 NOTE — ASSESSMENT & PLAN NOTE
· Recurrent with fever of 101 5 degrees, tachycardia of 108 bpm, tachypnea of 29, and leukocytosis  · ID on board and appreciated  · Blood cxs, procalcitonin, lactic acid,  Ordered and UA with multiple organisms including citrobacter, Morganella, proteus and Kleb  · Abx changed to Cefepime from Rocephin  · CT C/A/P ordered to rule out occult infectious etiology  · Note that patient has Initial source of sepsis as was bacteremia, C diff colitis  ID was consulted  · + evidence of colitis on ct scan  Status post C diff panel; positive toxin PCR but negative EIA  On p o  Vancomycin through, Diarrhea resolved  · Positive recurrent bacteremia  Presumed secondary to PICC line with use of TPN  Prior history of recent MSSE  · Repeat blood cx neg after 5 days  · Discontinued presenting PICC  New PICC ordered (9/9) which was dc'ed after abx iv daptomycin completed  · Patient remained stable for few days however spiked fever 102- new labs along with blood cultures ordered  UA and chest x-ray, CT abdomen pelvis  Id reconsulted and discuss case with ID    · Blood cx of 9/23 is negative for 3 days

## 2022-09-26 NOTE — ASSESSMENT & PLAN NOTE
· Patient with history of gastric bypass surgery  Has had significant malnutrition issues and anastomotic ulcerations  Seen by Bariatric Service during recent hospitalization at [facility] who recommended prolonged course of TPN for nutritional optimization and possible revision of surgery in the future  · Was planned to have appt with bariatric sx on 8/20 however had to be rescheduled  · Given recurrent bacteremia; s/p GI consult; recommended discontinuation of TPN  S/p PEG, TF initiated- nutrition adjusted rate of TF  · P O   Diet for pleasure feeding resumed  · To f/u with bariatric surgery

## 2022-09-26 NOTE — ASSESSMENT & PLAN NOTE
· Palliative on board  Family meeting held with prior provider:  patient has had a change of heart and wants to be txt focused and proceed with PEG placement  She states that she realized that she is not ready to die  · 9/15:  Jam Landon discussion held with patient regarding likely outcomes of either cardiac arrest or respiratory failure requiring intubation given her current clinical state    Patient states she wishes not to be DNR DNI with aggressive care

## 2022-09-26 NOTE — NURSING NOTE
Attempted patients bloodwork  Patient ripped needle out of arm as I was obtaining bloodwork  Patient refusing  Will pass on

## 2022-09-26 NOTE — PHYSICAL THERAPY NOTE
Physical Therapy Cancellation Note    The patient declining any interventions today  Will continue to follow as appropriate      Nataly Montoya PTA

## 2022-09-26 NOTE — PROGRESS NOTES
[address] [facility]  Progress Note - Joyce Reyes 1945, 76 y o  female MRN: [id number]  Unit/Bed#: [facility] 317-01 Encounter: [id number]  Primary Care Provider: Liza Lu MD   Date and time admitted to hospital: 8/30/2022 11:49 PM    * Sepsis [facility])  Assessment & Plan  · Recurrent with fever of 101 5 degrees, tachycardia of 108 bpm, tachypnea of 29, and leukocytosis  · ID on board and appreciated  · Blood cxs, procalcitonin, lactic acid,  Ordered and UA with multiple organisms including citrobacter, Morganella, proteus and Kleb  · Abx changed to Cefepime from Rocephin  · CT C/A/P ordered to rule out occult infectious etiology  · Note that patient has Initial source of sepsis as was bacteremia, C diff colitis  ID was consulted  · + evidence of colitis on ct scan  Status post C diff panel; positive toxin PCR but negative EIA  On p o  Vancomycin through, Diarrhea resolved  · Positive recurrent bacteremia  Presumed secondary to PICC line with use of TPN  Prior history of recent MSSE  · Repeat blood cx neg after 5 days  · Discontinued presenting PICC  New PICC ordered (9/9) which was dc'ed after abx iv daptomycin completed  · Patient remained stable for few days however spiked fever 102- new labs along with blood cultures ordered  UA and chest x-ray, CT abdomen pelvis  Id reconsulted and discuss case with ID  · Blood cx of 9/23 is negative for 3 days      Bacteremia  Assessment & Plan  · Recent hx of staph epi bacteremia in 7/2022, presumed due to picc line  ·  recurrence 1/2 set staph epi, 2nd set no growth thus far  · Had been on IV vancomycin but discontinued due to fluctuating levels  Completed vancomycin to daptomycin courses 9/15  · S/p TAVIA (9/9) no evidence of vegetation  · Repeat blood cx neg x 5 days   · TPN discontinued   S/p PEG placement  · PICC line discontinued    New fever 9/23/22, pan culture and workup initiated, appreciate infectious disease input  Probably from new UTI  Urine cx is pending while on Rocephin      Diarrhea  Assessment & Plan  --Diarrhea on exam today which she was not aware she had  On C deff prophylaxis with po Vanco  Will repeat C deff and if positive while on po vanco, may need a change in regime  ? From TF versus colitis recently  Will await C deff result     Acute encephalopathy  Assessment & Plan  · Patient is mildly confused today on my exam  · ? 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  · S/p recent EGD in 7/22 revealing: Residual marginal ulcer of the gastrojejunostomy anastomosis with improved size  · S/p EGD 9/2 revealing: Large, cratered ulcer in the gastrojejunal anastomosis   · Received PRBC transfusion x 1 this admission,  continue monitoring transfuse less than 7  · Cont PPI, BID, carafate    NOAH (acute kidney injury) ([facility] [address] )  Assessment & Plan  Cr  Of 1 30 which is normal   Continue gentle IV fluids  Avoid all possible nephrotoxins  Ambulatory dysfunction  Assessment & Plan  · Exacerbated by diarrhea/dehydration however patient is chronically malnourished  · S/p PT/OT evaluation; to return to SNF  · Encouraged participation with PT/OT      Goals of care, counseling/discussion  Assessment & Plan  · Palliative on board  Family meeting held with prior provider:  patient has had a change of heart and wants to be txt focused and proceed with PEG placement  She states that she realized that she is not ready to die  · 9/15:  Colin Carlton discussion held with patient regarding likely outcomes of either cardiac arrest or respiratory failure requiring intubation given her current clinical state    Patient states she wishes not to be DNR DNI with aggressive care      Moderate protein-calorie malnutrition [facility])  Assessment & Plan  Malnutrition Findings:   Adult Malnutrition type: Chronic illness  Adult Degree of Malnutrition: Other severe protein calorie malnutrition  Malnutrition Characteristics: Weight loss, Muscle loss     On Jevity 1 2 - resume at lower max rate   Checked CMP and it shows hyponatremia of 130, low albumin but mildly elevated AST and A phos    360 Statement: Severe/Chronic malnutrition r/t condition, as evidenced by 4 8% wt loss x < 1 week (9/2/22: 125#, 9/5/22: 119#), and severe muscle mass depletion (temples/clavicle)  Treated with liberalized diet and nutrition supplements, possibly nutrition support pending goals of care    BMI Findings: Body mass index is 18 28 kg/m²  Hx of Savage-en Y gastric bypass, 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  Now off due to recurrent bacteremia  S/p PEG placement and on PO feeds  Cont nutritional supplements    H/O bariatric surgery - bypass  Assessment & Plan  · Patient with history of gastric bypass surgery  Has had significant malnutrition issues and anastomotic ulcerations  Seen by Bariatric Service during recent hospitalization at [facility] who recommended prolonged course of TPN for nutritional optimization and possible revision of surgery in the future  · Was planned to have appt with bariatric sx on 8/20 however had to be rescheduled  · Given recurrent bacteremia; s/p GI consult; recommended discontinuation of TPN  S/p PEG, TF initiated- nutrition adjusted rate of TF  · P O   Diet for pleasure feeding resumed  · To f/u with bariatric surgery      Transaminitis  Assessment & Plan  · NO RQU pain, USG with gallstones, no choelcystitis  · Trend LFTs- trending down   · Probably 2/2 to tube feed   · CMP in am     Left upper quadrant abdominal pain  Assessment & Plan  Likely secondary to severe constipation, patient with small bowel movements today with significant improvement in pain, KUB suggestive of large volume stool burden,  Pain has subsided- ultrasound showed cholelithiasis without cholecystitis    Improved pain around PEG tube site  Gastroenterology recommendations appreciated, no signs of infection  Her retention disc was loosened to 2 5 cm from 1 5 cm yesterday  MiraLax b i d  Hyponatremia  Assessment & Plan  Na at 129   Will obtain urine electrolyte studies today  Nacl tab 1 gm TID  BMP in am   Adjust TF and PO feeds          VTE Pharmacologic Prophylaxis: VTE Score: 3 Moderate Risk (Score 3-4) - Pharmacological DVT Prophylaxis Ordered: heparin  Patient Centered Rounds: I performed bedside rounds with nursing staff today  Discussions with Specialists or Other Care Team Provider: Nurse    Education and Discussions with Family / Patient: Updated  (brother) via phone  Time Spent for Care: 30 minutes  More than 50% of total time spent on counseling and coordination of care as described above  Current Length of Stay: 26 day(s)  Current Patient Status: Inpatient   Certification Statement: The patient will continue to require additional inpatient hospital stay due to Sepsis  Discharge Plan: Anticipate discharge in >72 hrs to discharge location to be determined pending rehab evaluations  Code Status: Level 3 - DNAR and DNI    Subjective:   No Complaint    Objective:     Vitals:   Temp (24hrs), Av 2 °F (37 3 °C), Min:97 8 °F (36 6 °C), Max:101 5 °F (38 6 °C)    Temp:  [97 8 °F (36 6 °C)-101 5 °F (38 6 °C)] 97 8 °F (36 6 °C)  HR:  [] 95  Resp:  [16-29] 29  BP: (122-124)/(55-66) 124/63  SpO2:  [92 %-97 %] 96 %  Body mass index is 18 28 kg/m²  Input and Output Summary (last 24 hours): Intake/Output Summary (Last 24 hours) at 2022  Last data filed at 2022 1454  Gross per 24 hour   Intake --   Output 1100 ml   Net -1100 ml       Physical Exam:     Constitutional:       General: She is not in acute distress  HENT:      Head: Normocephalic and atraumatic     Eyes:      General: No scleral icterus      Pupils: Pupils are equal, round, and reactive to light  Cardiovascular:      Rate and Rhythm: Normal rate and regular rhythm       Pulses: Normal pulses       Heart sounds: No murmur heard  Pulmonary:      Effort: Pulmonary effort is normal  No respiratory distress       Breath sounds: No wheezing  Abdominal:      General: Bowel sounds are normal  There is no distension       Tenderness: LUQ which she said is improved but not completely resolved     Comments: No erythema or signs of infection around PEG tube site   Musculoskeletal:         General: No swelling or deformity  Skin:     General: Skin is warm       Capillary Refill: Capillary refill takes less than 2 seconds       Coloration: Skin is not jaundiced  Neurological:      General: No focal deficit present       Mental Status: She is alert  Mental status is at baseline     Psychiatric:         Mood and Affect: Mood normal          Behavior: Behavior normal      Additional Data:     Labs:  Results from last 7 days   Lab Units 09/26/22  1025   WBC Thousand/uL 14 50*   HEMOGLOBIN g/dL 8 0*   HEMATOCRIT % 26 2*   PLATELETS Thousands/uL 483*   NEUTROS PCT % 73   LYMPHS PCT % 13*   MONOS PCT % 5   EOS PCT % 8*     Results from last 7 days   Lab Units 09/26/22  1025   SODIUM mmol/L 129*   POTASSIUM mmol/L 5 2   CHLORIDE mmol/L 101   CO2 mmol/L 24   BUN mg/dL 24   CREATININE mg/dL 1 30   ANION GAP mmol/L 4   CALCIUM mg/dL 7 7*   ALBUMIN g/dL 1 5*   TOTAL BILIRUBIN mg/dL 0 30   ALK PHOS U/L 206*   ALT U/L 83*   AST U/L 129*   GLUCOSE RANDOM mg/dL 141*         Results from last 7 days   Lab Units 09/26/22  1758 09/26/22  1203 09/26/22  0530 09/25/22  1619 09/25/22  1144 09/25/22  0008 09/24/22  1805 09/24/22  0538 09/23/22  2345 09/23/22  1658 09/23/22  1122 09/23/22  0624   POC GLUCOSE mg/dl 97 104 132 95 148* 144* 109 124 124 111 141* 99         Results from last 7 days   Lab Units 09/23/22  1601   LACTIC ACID mmol/L 1 4 Lines/Drains:  Invasive Devices  Report    Peripheral Intravenous Line  Duration           Peripheral IV 09/24/22 Proximal;Right;Ventral (anterior) Forearm 1 day          Drain  Duration           Gastrostomy/Enterostomy Percutaneous Endoscopic Gastrostomy (PEG) 20 Fr  LUQ 18 days                      Imaging: No pertinent imaging reviewed  CT C/A/P is ordered    Recent Cultures (last 7 days):   Results from last 7 days   Lab Units 09/26/22  1221 09/25/22  1642 09/23/22  1601 09/23/22  1558   BLOOD CULTURE  Received in Microbiology Lab  Culture in Progress  Received in Microbiology Lab  Culture in Progress  --   --  No Growth at 72 hrs  No Growth at 72 hrs     URINE CULTURE   --   --  >100,000 cfu/ml Proteus vulgaris*  >100,000 cfu/ml Citrobacter freundii*  >100,000 cfu/ml Klebsiella oxytoca ESBL*  >100,000 cfu/ml Morganella morganii*  --    C DIFF TOXIN B BY PCR   --  Negative  --   --        Last 24 Hours Medication List:   Current Facility-Administered Medications   Medication Dose Route Frequency Provider Last Rate   • acetaminophen  650 mg Oral Q6H PRN Jovita Mcdermott DO     • ALPRAZolam  0 25 mg Oral TID PRN Karol Lara DO     • alteplase  2 mg Intracatheter Once Larry Sevilla PA-C     • bisacodyl  10 mg Rectal Daily PRN Zeinab Radford MD     • bisacodyl  10 mg Rectal Once Jose Friedman MD     • cefepime  1,000 mg Intravenous Q12H Hany Abarca MD 1,000 mg (09/26/22 1349)   • clonazePAM  3 mg Oral HS Jovita Mcdermott DO     • folic acid  1 mg Oral Daily Jovita Mcdermott DO     • heparin (porcine)  5,000 Units Subcutaneous [facility] Karol Lara DO     • levothyroxine  112 mcg Oral Early Morning Jovita Mcdermott DO     • magnesium hydroxide  30 mL Oral Daily PRN Zeinab Radford MD     • metoclopramide  10 mg Intravenous Q6H PRN Zeinab Radford MD     • midodrine  5 mg Oral TID AC Jovita Mcdermott DO     • ondansetron  4 mg Intravenous Q6H PRN Jovita Mcdermott DO     • oxyCODONE  5 mg Oral Q6H Dana Dempsey MD     • pantoprazole  40 mg Oral BID AC Nakita Sifuentes DO     • polyethylene glycol  17 g Per PEG Tube BID Domonique Cho DO     • senna  2 tablet Oral HS Yohannes Ramsay MD     • sodium chloride  1 g Oral TID With Meals Tiffany Asencio MD     • sucralfate  1 g Oral 4x Daily (AC & HS) Nakita Sifuentes DO     • thiamine  100 mg Oral Daily Jovitaaixa Mcdermott DO     • traZODone  150 mg Oral HS Jovitakash Mcdermott DO     • vancomycin  125 mg Oral Q12H [address] Beatrice Herman MD          Today, Patient Was Seen By: Flor Mathew MD    **Please Note: This note may have been constructed using a voice recognition system  **

## 2022-09-26 NOTE — ASSESSMENT & PLAN NOTE
· NO RQU pain, USG with gallstones, no choelcystitis  · Trend LFTs- trending down   · Probably 2/2 to tube feed   · CMP in am

## 2022-09-26 NOTE — OCCUPATIONAL THERAPY NOTE
OT CANCEL NOTE:    Attempted to see patient for OT treatment, however, pt was declining to participate  Per RN, pt is not doing too well today and is not very cooperative  OT will continue to follow and treat as able

## 2022-09-26 NOTE — CASE MANAGEMENT
Case Management Discharge Planning Note    Patient name Bernabe Linda  Location [facility] 587-93 MRN [id number]  : 1945 Date 2022       Current Admission Date: 2022  Current Admission Diagnosis:Sepsis [facility])   Patient Active Problem List    Diagnosis Date Noted   • Transaminitis 2022   • Left upper quadrant abdominal pain 2022   • Acute encephalopathy 2022   • Moderate protein-calorie malnutrition ([address] ) 2022   • Bacteremia 2022   • Acute on chronic anemia 2022   • Goals of care, counseling/discussion 2022   • Colitis presumed to be due to infection 2022   • Acute cystitis without hematuria 2022   • Acute kidney insufficiency 2022   • Fall 2022   • Diarrhea 2022   • Sepsis ([address] ) 2022   • Sacral ulcer ([address] ) 2022   • Gram-positive bacteremia 2022   • Severe protein-calorie malnutrition ([address] ) 2022   • Bilateral leg edema 2022   • Ambulatory dysfunction 2022   • Acute blood loss anemia 2022   • NOAH (acute kidney injury) ([address] ) 2022   • Gastric ulcer 2022   • Fever 2022   • Anemia 2022   • Dyspnea on exertion 2022   • Generalized weakness 2022   • Elevated LFTs 2022   • Hyponatremia 2022   • Abnormal CT scan 2022   • H/O bariatric surgery - bypass 2022   • Cerumen debris on tympanic membrane of right ear 2022   • Nasal congestion 2022   • Bilateral hearing loss 2022   • Fibromyalgia syndrome 2021   • Osteopenia of both forearms 2021   • Medicare annual wellness visit, subsequent 2021   • Shortness of breath 2021   • Acute bronchitis 2021   • COVID-19 2021   • Dysphasia 2020   • Epigastric pain 2020   • Hypothyroidism 2020   • Gastroesophageal reflux disease without esophagitis 2020   • Depression 2020   • Fibromyalgia, primary 2020 • Iron deficiency 11/13/2020   • Numbness of foot 11/13/2020      LOS (days): 26  Geometric Mean LOS (GMLOS) (days): 4 80  Days to GMLOS:-21 4     OBJECTIVE:  Risk of Unplanned Readmission Score: 45 97         Current admission status: Inpatient   Preferred Pharmacy:   [place] [id number] [id number] [facility], [address] [place] [address] [address] [facility] [address]  Phone: [phone] Fax: [phone]    [address] [place] [address]  Phone: [phone] Fax: [phone]    Primary Care Provider: Alice Alvarado MD    Primary Insurance: [facility]  Secondary Insurance:     DISCHARGE DETAILS:                    Additional Comments: Temp 101 5 not stable for d/c today

## 2022-09-26 NOTE — PROGRESS NOTES
Progress Note - Infectious Disease   Bora Ramsay 76 y o  female MRN: [id number]  Unit/Bed#: [facility] 317-01 Encounter: [id number]      Impression/Plan:  1  Sepsis, recurrent   Isolated fever along with elevation in white count 9/23   Noted to have eosinophils on differential   Consider UTI given dysuria, pyuria on UA   Urine cultures now polymicrobial  No other appreciable source   Blood cultures negative   Recent RUQ U/S and CT with cholelithiasis but no clinical evidence of cholecystitis  White count is down trending  Fevers recurring  C diff PCR testing negative  Upper extremity Doppler with right forearm superficial thrombophlebitis  Consider also occult abdominal infection, viral illness such as COVID, occult bacteremia or noninfectious etiology such as GI bleed given down trending hemoglobin  Will switch to cefepime 1 g q 12, given 6  Follow-up urine culture  Follow-up pending blood cultures  Repeat blood cultures ordered today  Will check COVID/flu testing for completeness  Continue to trend fever curve/vitals  Repeat CBC/chemistry tomorrow  Recommend CT of the chest/abdomen/pelvis with contrast, if possible  Additional supportive care as per primary  Additional interventions pending clinical course     2  Urinary tract infection   UA with some pyuria and patient reporting dysuria  Urine cultures now noted to be polymicrobial   Antibiotic as above for now  Monitor for ongoing symptoms  Continue to trend fever curve/WBC  Follow-up pending urine cultures     3  Abnormal abdominal imaging   Patient noted to have some abnormal changes to the gallbladder on imaging   Recent ultrasound unremarkable   Alkaline phosphatase borderline   Patient only localizes discomfort near the PEG tube, adjusted by GI  Monitor abdominal exam     4  Recent C diff   Repeat PCR testing negative  Continue C diff prophylaxis while on IV antibiotics     5   Progressing anemia and recent GI bleed   Hemoglobin downtrended this weekend  Reportedly had some increased stool output  Continue to monitor hemoglobin and stool output  GI evaluations noted      6  Elevated serum creatinine   Patient's creatinine has essentially remained elevated since earlier in admission   Uncertain if ongoing drug reaction as above   Could also be intake related  Downtrended over the weekend  Fluid hydration as per primary  Continue enteral feeding  Consider review of medications     Above plan discussed with primary service attending  ID consult service will continue to follow      Antibiotics:  Cefepime 1  Total antibiotic 4  PO vancomycin 4    Subjective:  Patient seen at bedside and she was noticeably lethargic  Noted to have high fever again today  White blood cell count down trending  Hemoglobin however also down trending  C diff testing PCR negative  Blood cultures so far without growth  Urine cultures with multiple organisms on discussion with micro lab  Patient offers no complaints on my evaluation but she does not focus for any prolonged period of time  Objective:  Vitals:  Temp:  [99 5 °F (37 5 °C)-101 5 °F (38 6 °C)] 101 5 °F (38 6 °C)  HR:  [] 108  Resp:  [16-29] 29  BP: (111-123)/(49-66) 122/55  SpO2:  [92 %-97 %] 92 %  Temp (24hrs), Av 6 °F (38 1 °C), Min:99 5 °F (37 5 °C), Max:101 5 °F (38 6 °C)  Current: Temperature: (!) 101 5 °F (38 6 °C)    Physical Exam:   General Appearance:  Patient will intermittently awaken and interact and then almost right away fall back asleep  Chronically ill-appearing  No acute respiratory distress   Throat: Oropharynx moist without lesions  Lungs:   Decreased breath sounds throughout, no wheezes, rales or rhonchi appreciated   Heart:  RRR; no murmur, rub or gallop appreciated   Abdomen:   Soft, no tenderness elicited on palpation, nondistended, bowel sounds present, peg tube site unremarkable  Extremities: No clubbing, cyanosis or edema   Skin: No new rashes or lesions   No new draining wounds noted  Labs, Imaging, & Other studies:   All pertinent labs and imaging studies were personally reviewed  Results from last 7 days   Lab Units 09/26/22  1025 09/25/22  1001 09/24/22  1746   WBC Thousand/uL 14 50* 18 02* 24 02*   HEMOGLOBIN g/dL 8 0* 9 3* 6 2*   PLATELETS Thousands/uL 483* 482* 492*     Results from last 7 days   Lab Units 09/26/22  1025 09/24/22  1746 09/23/22  2244 09/23/22  1601   POTASSIUM mmol/L 5 2   < > 5 3 5 4*   CHLORIDE mmol/L 101   < > 99 98   CO2 mmol/L 24   < > 24 24   BUN mg/dL 24   < > 29* 28*   CREATININE mg/dL 1 30   < > 1 54* 1 52*   EGFR ml/min/1 73sq m 39   < > 31 32   CALCIUM mg/dL 7 7*   < > 8 3 8 7   AST U/L 129*  --  63* 84*   ALT U/L 83*  --  53 61   ALK PHOS U/L 206*  --  211* 228*    < > = values in this interval not displayed  Results from last 7 days   Lab Units 09/25/22  1642 09/23/22  1601 09/23/22  1558   BLOOD CULTURE   --   --  No Growth at 48 hrs  No Growth at 48 hrs     URINE CULTURE   --  >100,000 cfu/ml Proteus vulgaris*  --    C DIFF TOXIN B BY PCR  Negative  --   --

## 2022-09-27 NOTE — PLAN OF CARE
Problem: SKIN/TISSUE INTEGRITY - ADULT  Goal: Skin Integrity remains intact(Skin Breakdown Prevention)  Description: Assess:  -Perform Gabe assessment every   -Clean and moisturize skin every   -Inspect skin when repositioning, toileting, and assisting with ADLS  -Assess under medical devices such as  every   -Assess extremities for adequate circulation and sensation     Bed Management:  -Have minimal linens on bed & keep smooth, unwrinkled  -Change linens as needed when moist or perspiring  -Avoid sitting or lying in one position for more than hours while in bed  -Keep HOB at degrees     Toileting:  -Offer bedside commode  -Assess for incontinence every   -Use incontinent care products after each incontinent episode such as     Activity:  -Mobilize patient  times a day  -Encourage activity and walks on unit  -Encourage or provide ROM exercises   -Turn and reposition patient every  Hours  -Use appropriate equipment to lift or move patient in bed  -Instruct/ Assist with weight shifting every  when out of bed in chair  -Consider limitation of chair time  hour intervals    Skin Care:  -Avoid use of baby powder, tape, friction and shearing, hot water or constrictive clothing  -Relieve pressure over bony prominences using  -Do not massage red bony areas    Next Steps:  -Teach patient strategies to minimize risks such as   -Consider consults to  interdisciplinary teams such as   Outcome: Progressing

## 2022-09-27 NOTE — ASSESSMENT & PLAN NOTE
Recent hx of staph epi bacteremia in 7/2022, presumed due to picc line  · recurrence 1/2 set staph epi, 2nd set without growth  · Had been on IV vancomycin but discontinued due to fluctuating levels  Completed vancomycin to daptomycin courses 9/15  · S/p TAVIA (9/9) no evidence of vegetation  · Blood cultures had been negative however repeated again due to isolated fever  · PICC and TPN discontinued, now has PEG tube

## 2022-09-27 NOTE — ASSESSMENT & PLAN NOTE
Malnutrition Findings:   Adult Malnutrition type: Chronic illness  Adult Degree of Malnutrition: Other severe protein calorie malnutrition  Malnutrition Characteristics: Weight loss, Muscle loss       360 Statement: Severe/Chronic malnutrition r/t condition, as evidenced by 4 8% wt loss x < 1 week (9/2/22: 125#, 9/5/22: 119#), and severe muscle mass depletion (temples/clavicle)  Treated with liberalized diet and nutrition supplements, possibly nutrition support pending goals of care    BMI Findings: Body mass index is 18 28 kg/m²  Hx of Savage-en Y gastric bypass, 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

## 2022-09-27 NOTE — ASSESSMENT & PLAN NOTE
Recurrent sepsis  Presented with fever, tachypnea, tachycardia leukocytosis  Patient presented with PICC line which was in place for TPN, patient was treated for presumed line infection  Repeat blood cultures negative  Was also treated for possible C diff infection  Doppler with right forearm superficial thrombophlebitis  Had isolated fever 9/23 therefore id was reconsulted, patient was having reports of dysuria  · Id following, currently on intravenous ertapenem for ESBL UTI  · Repeat CT chest abdomen pelvis with contrast pending  · Repeat blood cultures pending  · COVID-19 PCR negative  · Trend  · Plan for discharge to rehab, CM following

## 2022-09-27 NOTE — OCCUPATIONAL THERAPY NOTE
Occupational Therapy Progress Note     Patient Name: Ryann Hargrove  AOCCT'Y Date: 9/27/2022  Problem List  Principal Problem:    Sepsis [facility])  Active Problems:    Hyponatremia    H/O bariatric surgery - bypass    NOAH (acute kidney injury) ([facility] [address] )    Ambulatory dysfunction    Diarrhea    Moderate protein-calorie malnutrition (HCC)    Bacteremia    Acute on chronic anemia    Goals of care, counseling/discussion    Acute encephalopathy    Left upper quadrant abdominal pain    Transaminitis        09/27/22 0929   OT Last Visit   OT Visit Date 09/27/22   Note Type   Note Type Treatment   Restrictions/Precautions   Weight Bearing Precautions Per Order No   Other Precautions Cognitive; Chair Alarm; Bed Alarm;Multiple lines; Fall Risk;Pain;Contact/isolation   Pain Assessment   Pain Assessment Tool 0-10   Pain Score 6   Pain Location/Orientation Location: Buttocks   Patient's Stated Pain Goal No pain   Hospital Pain Intervention(s) Repositioned; Ambulation/increased activity; Emotional support   ADL   Eating Assistance 5  Supervision/Setup   Eating Deficit Setup;Verbal cueing;Supervision/safety; Increased time to complete   LB Dressing Assistance 2  Maximal Assistance   LB Dressing Deficit Don/doff R sock; Don/doff L sock   Toileting Assistance  1  Total Assistance   Toileting Deficit Perineal hygiene   Bed Mobility   Supine to Sit 3  Moderate assistance   Additional items Assist x 1; Increased time required;Verbal cues;LE management   Transfers   Sit to Stand 3  Moderate assistance   Additional items Assist x 1; Increased time required;Verbal cues   Stand to Sit 3  Moderate assistance   Additional items Assist x 1; Increased time required;Verbal cues   Functional Mobility   Functional Mobility 3  Moderate assistance   Additional items Hand hold assistance   Cognition   Overall Cognitive Status Impaired   Arousal/Participation Alert   Attention Attends with cues to redirect   Orientation Level Oriented to person;Oriented to place; Disoriented to time;Disoriented to situation   Memory Decreased short term memory;Decreased recall of recent events;Decreased recall of precautions   Following Commands Follows one step commands with increased time or repetition   Activity Tolerance   Activity Tolerance Patient limited by fatigue;Treatment limited secondary to agitation   Medical Staff Made Aware PT REMAINS APPROPRIATE FOR CO-SESSION WITH SKILLED PHYSICAL THERAPIST 2' CLINICALLY UNSTABLE PRESENTATION, MAX ENCOURAGEMENT TO PARTICIPATE, LIMITED ACTIVITY TOLERANCE AND PRESENT IMPAIRMENTS WHICH ARE A REGRESSION FROM THE PT'S BASELINE AND IMPACTING OVERALL OCCUPATIONAL PERFORMANCE  Assessment   Assessment PT SEEN FOR OT TX SESSION WITH FOCUS ON LB ADLS, TOILETING, FUNCTIONAL TXFS/MOBILITY, FEEDING, AND OVERALL ACTIVITY TOLERANCE  PT SUPINE UPON ARRIVAL REQUIRING MAX ENCOURAGEMENT AND THERAPEUTIC USE OF SELF FOR OOB ACTIVITY  PT REMAINS AGITATED WITH LIMITED INSIGHT/JUDGEMENT/SAFETY AWARENESS  PT REQUIRES MAX A FOR LB DRESSING INCLUDING DONNING B/L SOCKS  PT CONTS TO REQUIRE MOD A FOR SUPINE->SIT TXFS, SIT<->STAND TXF AND LIMITED FUNCTIONAL MOBILITY WITH HHA  PT NOTED TO BE INCONTINENT OF BOWEL, REQUIRING TOTAL ASSIST WITH HYGIENE/CLEAN-UP  WHEN DISTRACTED, PT AGREEABLE TO SIT OOB IN BEDSIDE CHAIR  PT LEFT WITH ALARM ON AND ALL NEEDS IN REACH  PT REQUIRED SET-UP FOR FEEDING, INCLUDING OPENING CONTAINERS- PT OBSERVED UNSAFELY USING UTENSILS TO TRAIL OPENING PRIOR TO INTERVENING  INITIAL OT GOALS REMAIN APPROPRIATE  GOAL DATE EXTENDED + 20 DAYS  CONT TO RECOMMEND INPT REHAB  WILL CONT TO FOLLOW  Plan   Treatment Interventions ADL retraining;Functional transfer training; Endurance training;UE strengthening/ROM; Cognitive reorientation;Patient/family training;Equipment evaluation/education; Compensatory technique education; Energy conservation; Activityengagement   Goal Expiration Date 10/17/22   OT Treatment Day 4   OT Frequency 2-3x/wk   Recommendation OT Discharge Recommendation Post acute rehabilitation services   AM-PAC Daily Activity Inpatient   Lower Body Dressing 2   Bathing 2   Toileting 2   Upper Body Dressing 3   Grooming 3   Eating 3   Daily Activity Raw Score 15   Daily Activity Standardized Score (Calc for Raw Score >=11) 34 69   AM-PAC Applied Cognition Inpatient   Following a Speech/Presentation 2   Understanding Ordinary Conversation 3   Taking Medications 2   Remembering Where Things Are Placed or Put Away 2   Remembering List of 4-5 Errands 2   Taking Care of Complicated Tasks 2   Applied Cognition Raw Score 13   Applied Cognition Standardized Score 30 46   Modified Joey Scale   Modified [place] Scale 4       Documentation completed by CARLOS De La Vega, OTR/L  [facility] OF THE [facility] Certified ID# [id number]

## 2022-09-27 NOTE — PLAN OF CARE
Problem: PHYSICAL THERAPY ADULT  Goal: Performs mobility at highest level of function for planned discharge setting  See evaluation for individualized goals  Description:    Equipment Recommended:  (RW)       See flowsheet documentation for full assessment, interventions and recommendations  Outcome: Progressing  Note: Prognosis: Fair  Problem List: Decreased strength, Decreased endurance, Impaired balance, Decreased mobility, Pain  Assessment: Pt seen for session for setup, bed mob, time spent EOB, transfers/standing trials, minimal gait, repositioning  Pt cooperative w/ session, willing to participate w/ moderate encouragement  Mildly improved bed mob and sitting balance but kalyani and frequently trys to sit w/ transfers and standing  frequent cues for safety and to keep pt on task  continue to recommend rehab at d/c  Barriers to Discharge: Inaccessible home environment, Decreased caregiver support     PT Discharge Recommendation: Return to facility with rehabilitation services    See flowsheet documentation for full assessment

## 2022-09-27 NOTE — PROGRESS NOTES
Progress Note - Infectious Disease   Tacho Cuello 76 y o  female MRN: [id number]  Unit/Bed#: [facility] 317-01 Encounter: [id number]      Impression/Plan:  1  Sepsis, recurrent   Isolated fever along with elevation in white count 9/23   Noted to have eosinophils on differential   Consider UTI given dysuria, pyuria on UA   Urine cultures now polymicrobial, with degree of resistance noted  No other appreciable source   Blood cultures negative   Recent RUQ U/S and CT with cholelithiasis but no clinical evidence of cholecystitis  White count is down trending  Fevers recurring  C diff PCR testing negative  Upper extremity Doppler with right forearm superficial thrombophlebitis  CT imaging of the abdomen with contrast with some incidental findings but otherwise no overt pathology  COVID testing negative  Repeat blood cultures with recurrent fever pending  Antibiotics adjusted further now based on susceptibilities  Will switch to ertapenem 500 Q 24, dose adjusted for 6  Tentative plan for 3 days of IV therapy  Continue oral vancomycin prophylaxis as below  Follow-up pending blood cultures  Continue to trend fever curve/vitals  Repeat CBC/chemistry tomorrow  Monitor for resolution of urinary symptoms  Additional supportive care as per primary  Will need PCP follow-up for additional findings on CT     2  Urinary tract infection   UA with some pyuria and patient reporting dysuria  Urine cultures now noted to be polymicrobial with some degree of resistance  Suspect partial treatment leading to declining white count but continued fever  Antibiotic adjusted as above  Monitor for ongoing symptoms  Continue to trend fever curve/WBC  Follow-up pending blood cultures     3  Abnormal abdominal imaging   Patient noted to have some abnormal changes to the gallbladder on imaging   Recent ultrasound unremarkable   Alkaline phosphatase borderline   Patient only localizes discomfort near the PEG tube, adjusted by GI    Repeat CT unremarkable  Monitor abdominal exam  Antibiotics as above     4  Recent C diff   Repeat PCR testing negative  Continue C diff prophylaxis while on IV antibiotics     5  Progressing anemia and recent GI bleed   Hemoglobin downtrended this weekend  Reportedly had some increased stool output  Continue to monitor hemoglobin and stool output  GI evaluations noted      6  Chronic kidney disease  Creatinine seems to have improved closer to patient's believed baseline  Possibly intake related or drug related  Fluid hydration as per primary  Continue enteral feeding  Consider review of medications  Antibiotics dose adjusted as above  Repeat chemistry tomorrow  Further dose adjust antibiotics as needed     Above plan discussed with the patient and with primary service  ID consult service will continue to follow      Antibiotics:  Ertapenem 1  Total antibiotic 5  PO vancomycin 5    Subjective:  Patient seen at bedside this morning and she was sitting up in chair  Her only complaint was pain in her buttock  She is overall frustrated by still being in the hospital   She denied having any chest pain, shortness of breath, nausea, vomiting  She does recall still having discomfort with urination as of yesterday  Today so far has not had further discomfort  Unfortunately patient has fluctuating recall in terms of prior conversations and interventions  Objective:  Vitals:  Temp:  [97 8 °F (36 6 °C)-102 3 °F (39 1 °C)] 98 6 °F (37 °C)  HR:  [] 76  Resp:  [14-29] 14  BP: ()/(51-77) 99/51  SpO2:  [92 %-97 %] 97 %  Temp (24hrs), Av 6 °F (37 6 °C), Min:97 8 °F (36 6 °C), Max:102 3 °F (39 1 °C)  Current: Temperature: 98 6 °F (37 °C)    Physical Exam:   General Appearance:  Alert, interactive, nontoxic, no acute distress  Fluctuating recall noticeable in conversation  Throat: Oropharynx moist without lesions      Lungs:   Clear to auscultation bilaterally; no wheezes, rhonchi or rales; respirations unlabored on room air   Heart:  RRR; no murmur, rub or gallop appreciated   Abdomen:   Soft, non-tender, non-distended, positive bowel sounds  No suprapubic discomfort specifically  No CVA tenderness  Patient has some slight erythema at the lower portion of the tailbone and back but no open wound appreciated currently  Unable to evaluate further into the gluteal fold  Extremities: No clubbing, cyanosis or edema   Skin: No new rashes or lesions  No new draining wounds noted  IV site unremarkable  Labs, Imaging, & Other studies:   All pertinent labs and imaging studies were personally reviewed  Results from last 7 days   Lab Units 09/26/22  1025 09/25/22  1001 09/24/22  1746   WBC Thousand/uL 14 50* 18 02* 24 02*   HEMOGLOBIN g/dL 8 0* 9 3* 6 2*   PLATELETS Thousands/uL 483* 482* 492*     Results from last 7 days   Lab Units 09/26/22  1025 09/24/22  1746 09/23/22  2244 09/23/22  1601   POTASSIUM mmol/L 5 2   < > 5 3 5 4*   CHLORIDE mmol/L 101   < > 99 98   CO2 mmol/L 24   < > 24 24   BUN mg/dL 24   < > 29* 28*   CREATININE mg/dL 1 30   < > 1 54* 1 52*   EGFR ml/min/1 73sq m 39   < > 31 32   CALCIUM mg/dL 7 7*   < > 8 3 8 7   AST U/L 129*  --  63* 84*   ALT U/L 83*  --  53 61   ALK PHOS U/L 206*  --  211* 228*    < > = values in this interval not displayed  Results from last 7 days   Lab Units 09/26/22  1221 09/25/22  1642 09/23/22  1601 09/23/22  1558   BLOOD CULTURE  Received in Microbiology Lab  Culture in Progress  Received in Microbiology Lab  Culture in Progress  --   --  No Growth at 72 hrs  No Growth at 72 hrs     URINE CULTURE   --   --  >100,000 cfu/ml Proteus vulgaris*  >100,000 cfu/ml Citrobacter freundii*  >100,000 cfu/ml Klebsiella oxytoca ESBL*  >100,000 cfu/ml Morganella morganii*  --    C DIFF TOXIN B BY PCR   --  Negative  --   --

## 2022-09-27 NOTE — PLAN OF CARE
Problem: OCCUPATIONAL THERAPY ADULT  Goal: Performs self-care activities at highest level of function for planned discharge setting  See evaluation for individualized goals  Description: Treatment Interventions: ADL retraining, Functional transfer training, Endurance training, UE strengthening/ROM, Cognitive reorientation, Patient/family training, Equipment evaluation/education, Compensatory technique education, Continued evaluation, Energy conservation, Activityengagement          See flowsheet documentation for full assessment, interventions and recommendations  Outcome: Progressing  Note: Limitation: Decreased ADL status, Decreased endurance, Decreased self-care trans, Decreased high-level ADLs  Prognosis: Fair  Assessment: PT SEEN FOR OT TX SESSION WITH FOCUS ON LB ADLS, TOILETING, FUNCTIONAL TXFS/MOBILITY, FEEDING, AND OVERALL ACTIVITY TOLERANCE  PT SUPINE UPON ARRIVAL REQUIRING MAX ENCOURAGEMENT AND THERAPEUTIC USE OF SELF FOR OOB ACTIVITY  PT REMAINS AGITATED WITH LIMITED INSIGHT/JUDGEMENT/SAFETY AWARENESS  PT REQUIRES MAX A FOR LB DRESSING INCLUDING DONNING B/L SOCKS  PT CONTS TO REQUIRE MOD A FOR SUPINE->SIT TXFS, SIT<->STAND TXF AND LIMITED FUNCTIONAL MOBILITY WITH HHA  PT NOTED TO BE INCONTINENT OF BOWEL, REQUIRING TOTAL ASSIST WITH HYGIENE/CLEAN-UP  WHEN DISTRACTED, PT AGREEABLE TO SIT OOB IN BEDSIDE CHAIR  PT LEFT WITH ALARM ON AND ALL NEEDS IN REACH  PT REQUIRED SET-UP FOR FEEDING, INCLUDING OPENING CONTAINERS- PT OBSERVED UNSAFELY USING UTENSILS TO TRAIL OPENING PRIOR TO INTERVENING  INITIAL OT GOALS REMAIN APPROPRIATE  GOAL DATE EXTENDED + 20 DAYS  CONT TO RECOMMEND INPT REHAB  WILL CONT TO FOLLOW       OT Discharge Recommendation: Post acute rehabilitation services

## 2022-09-27 NOTE — ASSESSMENT & PLAN NOTE
Patient with history of gastric bypass surgery  Has had significant malnutrition issues and anastomotic ulcerations  Seen by Bariatric Service during recent hospitalization at [facility] who recommended prolonged course of TPN for nutritional optimization and possible revision of surgery in the future  · Was planned to have appt with bariatric sx on 8/20 however had to be rescheduled  · Given recurrent bacteremia; s/p GI consult; recommended discontinuation of TPN  S/p PEG, TF initiated- nutrition adjusted rate of TF  · P O   Diet for pleasure feeding resumed  · To f/u with bariatric surgery

## 2022-09-27 NOTE — PLAN OF CARE
Problem: Potential for Falls  Goal: Patient will remain free of falls  Description: INTERVENTIONS:  - Educate patient/family on patient safety including physical limitations  - Instruct patient to call for assistance with activity   - Consult OT/PT to assist with strengthening/mobility   - Keep Call bell within reach  - Keep bed low and locked with side rails adjusted as appropriate  - Keep care items and personal belongings within reach  - Initiate and maintain comfort rounds  - Make Fall Risk Sign visible to staff  - Offer Toileting every  Hours, in advance of need  - Initiate/Maintain alarm  - Obtain necessary fall risk management equipment:   - Apply yellow socks and bracelet for high fall risk patients  - Consider moving patient to room near nurses station  Outcome: Progressing     Problem: SAFETY ADULT  Goal: Patient will remain free of falls  Description: INTERVENTIONS:  - Educate patient/family on patient safety including physical limitations  - Instruct patient to call for assistance with activity   - Consult OT/PT to assist with strengthening/mobility   - Keep Call bell within reach  - Keep bed low and locked with side rails adjusted as appropriate  - Keep care items and personal belongings within reach  - Initiate and maintain comfort rounds  - Make Fall Risk Sign visible to staff  - Offer Toileting every Hours, in advance of need  - Initiate/Maintain alarm  - Obtain necessary fall risk management equipment:   - Apply yellow socks and bracelet for high fall risk patients  - Consider moving patient to room near nurses station  Outcome: Progressing     Problem: DISCHARGE PLANNING  Goal: Discharge to home or other facility with appropriate resources  Description: INTERVENTIONS:  - Identify barriers to discharge w/patient and caregiver  - Arrange for needed discharge resources and transportation as appropriate  - Identify discharge learning needs (meds, wound care, etc )  - Arrange for interpretive services to assist at discharge as needed  - Refer to Case Management Department for coordinating discharge planning if the patient needs post-hospital services based on physician/advanced practitioner order or complex needs related to functional status, cognitive ability, or social support system  Outcome: Progressing     Problem: Prexisting or High Potential for Compromised Skin Integrity  Goal: Skin integrity is maintained or improved  Description: INTERVENTIONS:  - Identify patients at risk for skin breakdown  - Assess and monitor skin integrity  - Assess and monitor nutrition and hydration status  - Monitor labs   - Assess for incontinence   - Turn and reposition patient  - Assist with mobility/ambulation  - Relieve pressure over bony prominences  - Avoid friction and shearing  - Provide appropriate hygiene as needed including keeping skin clean and dry  - Evaluate need for skin moisturizer/barrier cream  - Collaborate with interdisciplinary team   - Patient/family teaching  - Consider wound care consult   Outcome: Progressing

## 2022-09-27 NOTE — PHYSICAL THERAPY NOTE
Physical Therapy Treatment Note       09/27/22 0930   PT Last Visit   PT Visit Date 09/27/22   Note Type   Note Type Treatment   Pain Assessment   Pain Assessment Tool 0-10   Pain Score 6   Pain Location/Orientation Location: Buttocks; Location: Generalized   Patient's Stated Pain Goal No pain   Hospital Pain Intervention(s) Ambulation/increased activity;Repositioned   Restrictions/Precautions   Weight Bearing Precautions Per Order No   Other Precautions Chair Alarm; Bed Alarm;Multiple lines;Telemetry;O2;Fall Risk;Pain;Agitated;Cognitive;Contact/isolation   General   Chart Reviewed Yes   Family/Caregiver Present No   Cognition   Overall Cognitive Status Impaired   Arousal/Participation Responsive   Attention Attends with cues to redirect   Orientation Level Oriented to person   Memory Unable to assess   Following Commands Follows one step commands inconsistently   Subjective   Subjective needs encouragement to participate   Bed Mobility   Rolling R 3  Moderate assistance   Additional items Assist x 1   Supine to Sit 3  Moderate assistance   Additional items Assist x 1   Additional Comments time spent for setup, as well as to convince pt to participate  sat EOB x several minutes prior to first transfer  Transfers   Sit to Stand 3  Moderate assistance   Additional items Assist x 1   Stand to Sit 3  Moderate assistance   Additional items Assist x 1   Additional Comments standing trials x 3- 2nd 2 to help clean pt due to bowel and urinary incontinence   Ambulation/Elevation   Gait pattern   (slow, ataxia, posterior LOB, mild B knee buckling/increased flexion)   Gait Assistance 3  Moderate assist   Additional items Assist x 1   Assistive Device Rolling walker   Distance On first stand took 1-2 steps towards chair, but then unsafely sat back onto bed  After brief seated rest, pt then ambulated 3-4' from bed to chair w/ same A w/ standing time to start to clean pt    Needed to sit, then did addiitonal stand x 1 x 45 sec after seated rest to finish cleaning pt and change linens  reposiitoned to comfort in the chair   Balance   Static Sitting Fair   Dynamic Sitting Poor +   Static Standing Poor   Dynamic Standing Poor -   Ambulatory Poor -   Endurance Deficit   Endurance Deficit Yes   Endurance Deficit Description fatigue, weakness, pain, cog, motivation   Activity Tolerance   Activity Tolerance Patient limited by fatigue;Treatment limited secondary to medical complications (Comment); Patient limited by pain   Nurse Made Aware yes   Assessment   Prognosis Fair   Problem List Decreased strength;Decreased endurance; Impaired balance;Decreased mobility;Pain   Assessment Pt seen for session for setup, bed mob, time spent EOB, transfers/standing trials, minimal gait, repositioning  Pt cooperative w/ session, willing to participate w/ moderate encouragement  Mildly improved bed mob and sitting balance but kalyani and frequently trys to sit w/ transfers and standing  frequent cues for safety and to keep pt on task  continue to recommend rehab at d/c   Goals   Patient Goals to eat breakfast   STG Expiration Date 09/29/22   PT Treatment Day 2   Plan   Treatment/Interventions Functional transfer training;LE strengthening/ROM; Endurance training; Therapeutic exercise;Patient/family training;Equipment eval/education; Bed mobility;Gait training   Progress Slow progress, cognitive deficits   PT Frequency 2-3x/wk   Recommendation   PT Discharge Recommendation Return to facility with rehabilitation services   Leona Brown [phone]   Turning in Bed Without Bedrails 3   Lying on Back to Sitting on Edge of Flat Bed 3   Moving Bed to Chair 2   Standing Up From Chair 2   Walk in Room 2   Climb 3-5 Stairs 1   Basic Mobility Inpatient Raw Score 13   Basic Mobility Standardized Score 33 99   Highest Level Of Mobility   JH-HLM Goal 4: Move to chair/commode   JH-HLM Achieved 4: Move to chair/commode   Sabina Pal PT, DPT CSRS

## 2022-09-27 NOTE — ASSESSMENT & PLAN NOTE
· Patient waxes and wanes  · ?  From prolonged hospital stay with  induced delirium vs metabolic encephalopathy  · No focal deficit on exam  · Delirium precautions

## 2022-09-27 NOTE — ASSESSMENT & PLAN NOTE
· Previously evaluated by palliative care  Wants to continue medical directed treatments with limits of DNR/DNI

## 2022-09-27 NOTE — ASSESSMENT & PLAN NOTE
· Recent PCR test negative  Had recent C diff infection  Continue C diff prophylaxis while on intravenous antibiotics

## 2022-09-27 NOTE — ASSESSMENT & PLAN NOTE
· Improved  Retention disc from PEG was loosened by Gastroenterology      · Also with concern for constipation on bowel regimen  · CT pending

## 2022-09-27 NOTE — PROGRESS NOTES
[address] [facility]  Progress Note - Adelfo Longest 1945, 76 y o  female MRN: [id number]  Unit/Bed#: [facility] 317-01 Encounter: [id number]  Primary Care Provider: Leopold Corwin, MD   Date and time admitted to hospital: 8/30/2022 11:49 PM    * Sepsis [facility])  Assessment & Plan  Recurrent sepsis  Presented with fever, tachypnea, tachycardia leukocytosis  Patient presented with PICC line which was in place for TPN, patient was treated for presumed line infection  Repeat blood cultures negative  Was also treated for possible C diff infection  Doppler with right forearm superficial thrombophlebitis  Had isolated fever 9/23 therefore id was reconsulted, patient was having reports of dysuria  · Id following, currently on intravenous ertapenem for ESBL UTI  · Repeat CT chest abdomen pelvis with contrast pending  · Repeat blood cultures pending  · COVID-19 PCR negative  · Trend  · Plan for discharge to rehab,  following  Bacteremia  Assessment & Plan  Recent hx of staph epi bacteremia in 7/2022, presumed due to picc line  · recurrence 1/2 set staph epi, 2nd set without growth  · Had been on IV vancomycin but discontinued due to fluctuating levels  Completed vancomycin to daptomycin courses 9/15  · S/p TAVIA (9/9) no evidence of vegetation  · Blood cultures had been negative however repeated again due to isolated fever  · PICC and TPN discontinued, now has PEG tube  NOAH (acute kidney injury) ([facility] [address] )  Assessment & Plan  · Now stable within baseline of 1 3-1 4  · Avoid nephrotoxins and hypotension  · BMP in a m  H/O bariatric surgery - bypass  Assessment & Plan  Patient with history of gastric bypass surgery  Has had significant malnutrition issues and anastomotic ulcerations  Seen by Bariatric Service during recent hospitalization at [facility] who recommended prolonged course of TPN for nutritional optimization and possible revision of surgery in the future    · Was planned to have appt with bariatric sx on 8/20 however had to be rescheduled  · Given recurrent bacteremia; s/p GI consult; recommended discontinuation of TPN  S/p PEG, TF initiated- nutrition adjusted rate of TF  · P O  Diet for pleasure feeding resumed  · To f/u with bariatric surgery      Acute encephalopathy  Assessment & Plan  · Patient waxes and wanes  · ? From prolonged hospital stay with  induced delirium vs metabolic encephalopathy  · No focal deficit on exam  · Delirium precautions      Hyponatremia  Assessment & Plan  · Sodium 134  Was started on Na Cl tablets this admission  · Monitor    Left upper quadrant abdominal pain  Assessment & Plan  · Improved  Retention disc from PEG was loosened by Gastroenterology  · Also with concern for constipation on bowel regimen  · CT pending    Diarrhea  Assessment & Plan  · Recent PCR test negative  Had recent C diff infection  Continue C diff prophylaxis while on intravenous antibiotics  Moderate protein-calorie malnutrition ([facility] [address] )  Assessment & Plan  Malnutrition Findings:   Adult Malnutrition type: Chronic illness  Adult Degree of Malnutrition: Other severe protein calorie malnutrition  Malnutrition Characteristics: Weight loss, Muscle loss       360 Statement: Severe/Chronic malnutrition r/t condition, as evidenced by 4 8% wt loss x < 1 week (9/2/22: 125#, 9/5/22: 119#), and severe muscle mass depletion (temples/clavicle)  Treated with liberalized diet and nutrition supplements, possibly nutrition support pending goals of care    BMI Findings: Body mass index is 18 28 kg/m²  Hx of Savage-en Y gastric bypass, 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  Now off due to recurrent bacteremia  S/p PEG placement and on PO feeds  Cont nutritional supplements    Ambulatory dysfunction  Assessment & Plan  · Exacerbated by diarrhea/dehydration however patient is chronically malnourished     · S/p PT/OT evaluation; to return to SNF  · Encouraged participation with PT/OT      Acute on chronic anemia  Assessment & Plan  · Evaluated by GI prior  · 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  · S/p recent EGD in  revealing: Residual marginal ulcer of the gastrojejunostomy anastomosis with improved size  · S/p EGD  revealing: Large, cratered ulcer in the gastrojejunal anastomosis   · Received PRBC transfusion x 1 this admission,  continue monitoring transfuse less than 7  · Cont PPI, BID, carafate    Transaminitis  Assessment & Plan  · NO RUQ pain, USG with gallstones, no choelcystitis  · Will trend    Goals of care, counseling/discussion  Assessment & Plan  · Previously evaluated by palliative care  Wants to continue medical directed treatments with limits of DNR/DNI  VTE Pharmacologic Prophylaxis: VTE Score: 3 Moderate Risk (Score 3-4) - Pharmacological DVT Prophylaxis Ordered: heparin  Patient Centered Rounds: I performed bedside rounds with nursing staff today  Discussions with Specialists or Other Care Team Provider: nursing, case management, will d/w ID    Education and Discussions with Family / Patient: Updated  (sister) via phone  Time Spent for Care: 30 minutes  More than 50% of total time spent on counseling and coordination of care as described above  Current Length of Stay: 27 day(s)    Current Patient Status: Inpatient   Certification Statement: The patient will continue to require additional inpatient hospital stay due to pending ID clearance, d/c plan  Discharge Plan: Anticipate discharge in 48-72 hrs to rehab facility  Code Status: Level 3 - DNAR and DNI    Subjective:   Patient is awake and alert, seen and examined in bed   continues to complain of dysuria  Denies subjective fevers or chills  Complains of some soreness around PEG tube insertion site, otherwise, no complaints  2    Objective:     Vitals:   Temp (24hrs), Av 2 °F (37 3 °C), Min:97 8 °F (36 6 °C), Max:102 3 °F (39 1 °C)    Temp:  [97 8 °F (36 6 °C)-102 3 °F (39 1 °C)] 98 6 °F (37 °C)  HR:  [] 76  Resp:  [14-16] 14  BP: ()/(51-77) 99/51  SpO2:  [92 %-97 %] 97 %  Body mass index is 18 28 kg/m²  Input and Output Summary (last 24 hours): Intake/Output Summary (Last 24 hours) at 9/27/2022 1115  Last data filed at 9/26/2022 1454  Gross per 24 hour   Intake --   Output 200 ml   Net -200 ml       Physical Exam:   Physical Exam  Vitals and nursing note reviewed  Constitutional:       General: She is not in acute distress  Comments: Chronically ill-appearing   Cardiovascular:      Rate and Rhythm: Normal rate  Pulmonary:      Breath sounds: Decreased breath sounds present  Abdominal:      General: Bowel sounds are normal       Palpations: Abdomen is soft  Tenderness: There is abdominal tenderness (Surrounding PEG site, no drainage or erythema appreciated)  Musculoskeletal:         General: Swelling (Right lower extremity) present  Skin:     Coloration: Skin is pale  Neurological:      Mental Status: She is alert and oriented to person, place, and time  Mental status is at baseline        Comments: Periods of confusion, waxes and wanes   Psychiatric:         Mood and Affect: Mood normal           Additional Data:     Labs:  Results from last 7 days   Lab Units 09/27/22  0909   WBC Thousand/uL 11 31*   HEMOGLOBIN g/dL 8 0*   HEMATOCRIT % 25 6*   PLATELETS Thousands/uL 419*   NEUTROS PCT % 66   LYMPHS PCT % 16   MONOS PCT % 7   EOS PCT % 10*     Results from last 7 days   Lab Units 09/27/22  0909 09/26/22  1025   SODIUM mmol/L 134* 129*   POTASSIUM mmol/L 4 9 5 2   CHLORIDE mmol/L 100 101   CO2 mmol/L 25 24   BUN mg/dL 25 24   CREATININE mg/dL 1 36* 1 30   ANION GAP mmol/L 9 4   CALCIUM mg/dL 8 4 7 7*   ALBUMIN g/dL  --  1 5*   TOTAL BILIRUBIN mg/dL  --  0 30   ALK PHOS U/L  --  206*   ALT U/L  --  83*   AST U/L  --  129*   GLUCOSE RANDOM mg/dL 108 141*         Results from last 7 days   Lab Units 09/27/22  0610 09/27/22  0003 09/26/22  1758 09/26/22  1203 09/26/22  0530 09/25/22  1619 09/25/22  1144 09/25/22  0008 09/24/22  1805 09/24/22  0538 09/23/22  2345 09/23/22  1658   POC GLUCOSE mg/dl 113 143* 97 104 132 95 148* 144* 109 124 124 111         Results from last 7 days   Lab Units 09/23/22  1601   LACTIC ACID mmol/L 1 4       Lines/Drains:  Invasive Devices  Report    Peripheral Intravenous Line  Duration           Peripheral IV 09/24/22 Proximal;Right;Ventral (anterior) Forearm 2 days          Drain  Duration           Gastrostomy/Enterostomy Percutaneous Endoscopic Gastrostomy (PEG) 20 Fr  LUQ 18 days                      Imaging: No pertinent imaging reviewed  Recent Cultures (last 7 days):   Results from last 7 days   Lab Units 09/26/22  1221 09/25/22  1642 09/23/22  1601 09/23/22  1558   BLOOD CULTURE  Received in Microbiology Lab  Culture in Progress  Received in Microbiology Lab  Culture in Progress  --   --  No Growth at 72 hrs  No Growth at 72 hrs     URINE CULTURE   --   --  >100,000 cfu/ml Proteus vulgaris*  >100,000 cfu/ml Citrobacter freundii*  >100,000 cfu/ml Klebsiella oxytoca ESBL*  >100,000 cfu/ml Morganella morganii*  --    C DIFF TOXIN B BY PCR   --  Negative  --   --        Last 24 Hours Medication List:   Current Facility-Administered Medications   Medication Dose Route Frequency Provider Last Rate   • acetaminophen  975 mg Oral Q6H PRN Marcellus Arechiga PA-C     • albuterol  2 5 mg Nebulization Q6H PRN Marcellus Arechiga PA-C     • ALPRAZolam  0 25 mg Oral TID PRN Sharren Saint, DO     • alteplase  2 mg Intracatheter Once Larry Sevilla PA-C     • bisacodyl  10 mg Rectal Daily PRN Ion Samuel MD     • bisacodyl  10 mg Rectal Once Brina Kohli MD     • clonazePAM  3 mg Oral HS Jovita Mcdermott DO     • ertapenem  500 mg Intravenous Q24H Lidia Mtz  mg (98/34/31 3612)   • folic acid  1 mg Oral Daily Jovita Mcdermott DO     • guaiFENesin  600 mg Oral Q12H [address] Aurelia Fidelina Garcia PA-C     • heparin (porcine)  5,000 Units Subcutaneous [facility] Rahul Urias, DO     • levothyroxine  112 mcg Oral Early Morning Jovitakash Mcdermott, DO     • magnesium hydroxide  30 mL Oral Daily PRN Nan Teixeira MD     • metoclopramide  10 mg Intravenous Q6H PRN Nan Teixeira MD     • midodrine  5 mg Oral TID AC Jovita Mcdermott DO     • ondansetron  4 mg Intravenous Q6H PRN Cindy Foley DO     • oxyCODONE  5 mg Oral Q6H Nan Teixeira MD     • pantoprazole  40 mg Oral BID AC Kaylan Miriam Arroyo DO     • polyethylene glycol  17 g Per PEG Tube BID Cindy Foley DO     • senna  2 tablet Oral HS Geoff Loving MD     • sodium chloride  1 g Oral TID With Meals Lotachukwu Lequita Boast, MD     • sucralfate  1 g Oral 4x Daily (AC & HS) Rahul Urias, DO     • thiamine  100 mg Oral Daily Jovitaaixa Mcdermott, DO     • traZODone  150 mg Oral HS Jovita Mcdermott DO     • vancomycin  125 mg Oral Q12H Tejas Brooks MD          Today, Patient Was Seen By: TOLU Jeter    **Please Note: This note may have been constructed using a voice recognition system  **

## 2022-09-28 PROBLEM — R33.9 URINARY RETENTION: Status: ACTIVE | Noted: 2022-09-28

## 2022-09-28 PROBLEM — I95.9 HYPOTENSION: Status: ACTIVE | Noted: 2022-09-28

## 2022-09-28 PROBLEM — R10.12 LEFT UPPER QUADRANT ABDOMINAL PAIN: Status: RESOLVED | Noted: 2022-09-14 | Resolved: 2022-09-28

## 2022-09-28 NOTE — ASSESSMENT & PLAN NOTE
· Improved  Retention disc from PEG was loosened by Gastroenterology      · Also with concern for constipation on bowel regimen  · CT negative for acute findings

## 2022-09-28 NOTE — ASSESSMENT & PLAN NOTE
· Has required intermittent catheterizations however now voiding spontaneously    · Continue retention protocol

## 2022-09-28 NOTE — ASSESSMENT & PLAN NOTE
Patient with history of gastric bypass surgery  Has had significant malnutrition issues and anastomotic ulcerations  Seen by Bariatric Service during recent hospitalization at [facility] who recommended prolonged course of TPN for nutritional optimization and possible revision of surgery in the future  · Was planned to have appt with bariatric sx on 8/20 however had to be rescheduled  · Given recurrent bacteremia; s/p GI consult; recommended discontinuation of TPN  S/p PEG, TF initiated- nutrition adjusted rate of TF  · P O   Diet for pleasure feeding resumed  · To f/u with bariatric surgery

## 2022-09-28 NOTE — WOUND OSTOMY CARE
Progress Note - Wound   Lucho Hartman 76 y o  female MRN: [id number]  Unit/Bed#: [facility] 317-01 Encounter: [id number]          Assessment Findings:    Patient seen today for wound care follow up visit  Patient is min assist with turning from side to side  Patient has Oralee Yousuf in place for urinary incontinence and is incontinent of bowel  Patient is independent with meals but receives tube feed supplementation for poor nutrition intake  Patient is thin and frail in appearance,            1  POA unstageable sacrum- small, oval full thickness wound with 100% moist yellow slough, small amount of drainage  Unable to full appreciate wound depth due to slough tissue      No induration, fluctuance, odor, warmth/temperature differences, redness, or purulence noted to the above noted wounds and skin areas assessed  New dressings applied per orders listed below  Patient tolerated well- no s/s of non-verbal pain or discomfort observed during the encounter  Bedside nurse aware of plan of care  See flow sheets for more detailed assessment findings  Wound care will continue to follow       Skin care Plan:  1-Cleanse sacro-buttocks with soap and water  Apply Triad paste to wound bed and cover with Allevyn foam  Sami with T for treatment  Change everyday and PRN  2-Turn/reposition q2h or when medically stable for pressure re-distribution on skin   3-Elevate heels to offload pressure  4-Moisturize skin daily with skin nourishing cream  5-Ehob cushion in chair when out of bed  6-Hydraguard to bilateral heels BID and PRN  7-P-500 specialty mattress        Wound 07/26/22 Pressure Injury Sacrum Mid (Active)   Wound Image   09/28/22 1415   Wound Description [facility] 09/28/22 1415   Pressure Injury Stage U 09/28/22 1415   Karyn-wound Assessment Clean;Dry; Intact 09/28/22 1415   Wound Length (cm) 1 cm 09/28/22 1415   Wound Width (cm) 1 cm 09/28/22 1415   Wound Depth (cm) 0 2 cm 09/28/22 1415   Wound Surface Area (cm^2) 1 cm^2 09/28/22 1415   Wound Volume (cm^3) 0 2 cm^3 09/28/22 1415   Calculated Wound Volume (cm^3) 0 2 cm^3 09/28/22 1415   Change in Wound Size % 0 09/28/22 1415   Tunneling 0 cm 09/28/22 1415   Tunneling in depth located at 0 09/28/22 1415   Undermining 0 09/28/22 1415   Undermining is depth extending from 0 09/28/22 1415   Wound Site Closure ALICIA 09/28/22 1415   Drainage Amount Small 09/28/22 1415   Drainage Description Serosanguineous 09/28/22 1415   Non-staged Wound Description Full thickness 09/28/22 1415   Treatments Cleansed 09/28/22 1415   Dressing Foam, Silicon (eg  Allevyn, etc); Moisture barrier 09/28/22 1415   Wound packed? No 09/28/22 1415   Packing- # removed 0 09/28/22 1415   Packing- # inserted 0 09/28/22 1415   Dressing Changed New 09/28/22 1415   Patient Tolerance Tolerated well 09/28/22 1415   Dressing Status Clean;Dry; Intact 09/28/22 1415         Belen Gusman BSN, RN, [facility]

## 2022-09-28 NOTE — ASSESSMENT & PLAN NOTE
Noted on CT C/A/P 9/26  · Focal thickening of the lower portion of the endometrium  While this is not expected in a patient of this age, this appears stable from 2016 suggesting a benign etiology  If there is clinical concern, follow-up ultrasound is recommended  · Small left upper lobe nodules, one of which is new from March 2022  Given the history of smoking, a follow-up chest CT is recommended in 12 months      · This was d/w sister and brother POA over phone by prior provider 9/28

## 2022-09-28 NOTE — QUICK NOTE
Updated sister on plan of care however brother Lucho Langston is POA  He was informed regarding discharge plan, potential rehab tomorrow  He is agreeable to COVID booster  Incidental findings were also discussed with him via phone      Jud Mcwilliams, [address] [facility]

## 2022-09-28 NOTE — NURSING NOTE
Patient will not allow any vitals to be taken and is refusing all medication while yelling and cursing  Tube feeds have been off and patient is also refusing the resume of the feeds  Slim was notified  Will attempt again shortly

## 2022-09-28 NOTE — ASSESSMENT & PLAN NOTE
· Recent PCR test negative  Had recent C diff infection  Continue C diff prophylaxis while on intravenous antibiotics and for 72 hours thereafter

## 2022-09-28 NOTE — CASE MANAGEMENT
Case Management Discharge Planning Note    Patient name Shelia Leo  Location [facility] 599-33 MRN [id number]  : 1945 Date 2022       Current Admission Date: 2022  Current Admission Diagnosis:Sepsis [facility])   Patient Active Problem List    Diagnosis Date Noted   • Hypotension 2022   • Urinary retention 2022   • Transaminitis 2022   • Acute encephalopathy 2022   • Moderate protein-calorie malnutrition ([address] ) 2022   • Bacteremia 2022   • Acute on chronic anemia 2022   • Goals of care, counseling/discussion 2022   • Colitis presumed to be due to infection 2022   • Acute cystitis without hematuria 2022   • Acute kidney insufficiency 2022   • Fall 2022   • Diarrhea 2022   • Sepsis ([address] ) 2022   • Sacral ulcer ([address] ) 2022   • Gram-positive bacteremia 2022   • Severe protein-calorie malnutrition ([address] ) 2022   • Bilateral leg edema 2022   • Ambulatory dysfunction 2022   • Acute blood loss anemia 2022   • NOAH (acute kidney injury) ([address] ) 2022   • Gastric ulcer 2022   • Fever 2022   • Anemia 2022   • Dyspnea on exertion 2022   • Generalized weakness 2022   • Elevated LFTs 2022   • Hyponatremia 2022   • Abnormal CT scan 2022   • H/O bariatric surgery - bypass 2022   • Cerumen debris on tympanic membrane of right ear 2022   • Nasal congestion 2022   • Bilateral hearing loss 2022   • Fibromyalgia syndrome 2021   • Osteopenia of both forearms 2021   • Medicare annual wellness visit, subsequent 2021   • Shortness of breath 2021   • Acute bronchitis 2021   • COVID-19 2021   • Dysphasia 2020   • Epigastric pain 2020   • Hypothyroidism 2020   • Gastroesophageal reflux disease without esophagitis 2020   • Depression 2020   • Fibromyalgia, primary 11/13/2020   • Iron deficiency 11/13/2020   • Numbness of foot 11/13/2020      LOS (days): 28  Geometric Mean LOS (GMLOS) (days): 4 80  Days to GMLOS:-23 6     OBJECTIVE:  Risk of Unplanned Readmission Score: 44 05         Current admission status: Inpatient   Preferred Pharmacy:   [facility] [address] [facility], [address] [place] [address] [address] [facility] [address]  Phone: [phone] Fax: [phone]    [address] [place] [address]  Phone: [phone] Fax: [phone]    Primary Care Provider: Marcin Radford MD    Primary Insurance: [facility]  Secondary Insurance:     DISCHARGE DETAILS:    Discharge planning discussed with[de-identified] Pt's brother     Comments - Freedom of Choice: [facility] reviewing to see if they can accept the patient with CRE   Left VM for patients brother requesting call back

## 2022-09-28 NOTE — NURSING NOTE
Patient remains uncooperative, not allowing any kind of assessment  Any advance to assess, patient yells "leave me alone, you disgusting pig " while swing her arms at any approach to her  Patient denied pain which was the only assessment able to be made  Will attempt again shortly

## 2022-09-28 NOTE — ASSESSMENT & PLAN NOTE
· Previously evaluated by palliative care  Wants to continue medical directed treatments with limits of DNR/DNI  · Continue current pain management regimen with scheduled oxycodone and additional p r n  Doses  Should follow-up with palliative Care as outpatient

## 2022-09-28 NOTE — PROGRESS NOTES
Progress Note - Infectious Disease   Shelia Leo 76 y o  female MRN: [id number]  Unit/Bed#: [facility] 317-01 Encounter: [id number]      Impression/Plan:  1  Sepsis, recurrent  Isolated fever along with elevation in white count 9/23   Noted to have eosinophils on differential   Consider UTI given dysuria, pyuria on UA   Urine cultures now polymicrobial, with degree of resistance noted  No other appreciable source   Blood cultures negative   Recent RUQ U/S and CT with cholelithiasis but no clinical evidence of cholecystitis   White count is down trending, labs pending today   Fevers improving   C diff PCR testing negative   Upper extremity Doppler with right forearm superficial thrombophlebitis  CT imaging of the abdomen with contrast with some incidental findings but otherwise no overt pathology  COVID testing negative  Repeat blood cultures with recurrent fever NGTD  Currently tolerating antibiotic  Continue ertapenem 500 Q 24, dose adjusted for 6  Plan for 3 days of IV therapy, last dose ordered for tomorrow  Continue oral vancomycin prophylaxis through 10/2  Follow-up pending blood cultures while admitted  Continue to trend fever curve/vitals  Repeat CBC/chemistry tomorrow  Monitor for recurrence of urinary symptoms  Additional supportive care as per primary  Will need PCP follow-up for additional findings on CT     2  Urinary tract infection   UA with some pyuria and patient reporting dysuria   Urine cultures now noted to be polymicrobial with some degree of resistance  Suspect partial treatment leading to declining white count but continued fever  Fevers now improving    Antibiotics as above  Monitor for ongoing symptoms  Continue to trend fever curve/WBC  Follow-up pending blood cultures     3  Abnormal abdominal imaging   Patient noted to have some abnormal changes to the gallbladder on imaging   Recent ultrasound unremarkable   Alkaline phosphatase borderline   Patient only localizes discomfort near the PEG tube, adjusted by GI  Repeat CT unremarkable  Monitor abdominal exam  Antibiotics as above     4  Recent C diff   Repeat PCR testing negative  Continue C diff prophylaxis while on IV antibiotics as above     5  Progressing anemia and recent GI bleed   Hemoglobin downtrended this weekend   Reportedly had some increased stool output   Continue to monitor hemoglobin and stool output   GI evaluations noted      6  Chronic kidney disease  Creatinine seems to have improved closer to patient's believed baseline  Possibly intake related or drug related  Fluid hydration as per primary  Continue enteral feeding  Consider review of medications  Antibiotics dose adjusted as above  Repeat chemistry tomorrow  Further dose adjust antibiotics as needed     Above plan discussed briefly with the patient  ID consult service will continue to follow      Antibiotics:  Ertapenem 2  Total antibiotic 6  PO vancomycin 6    Subjective:  Patient seen at bedside and she denied having any nausea, vomiting, chest pain or shortness of breath  Denies having any further dysuria  Overall tearful and frustrated by her lack of mobility  Currently working with PT and OT  Objective:  Vitals:  Temp:  [98 1 °F (36 7 °C)-98 2 °F (36 8 °C)] 98 1 °F (36 7 °C)  HR:  [] 83  Resp:  [15-16] 16  BP: ()/(49-54) 96/49  SpO2:  [92 %-98 %] 98 %  Temp (24hrs), Av 2 °F (36 8 °C), Min:98 1 °F (36 7 °C), Max:98 2 °F (36 8 °C)  Current: Temperature: 98 1 °F (36 7 °C)    Physical Exam:   General Appearance:  Alert, interactive, nontoxic, no acute distress  Chronically ill-appearing and frail  Throat: Oropharynx moist without lesions  Lungs:   Clear to auscultation bilaterally; no wheezes, rhonchi or rales; respirations unlabored on room air   Heart:  RRR; no murmur, rub or gallop appreciated   Abdomen:   Soft, non-tender, non-distended, positive bowel sounds  No CVA tenderness     Extremities: No clubbing, cyanosis or edema Skin: No new rashes or lesions  No new draining wounds noted  Labs, Imaging, & Other studies:   All pertinent labs and imaging studies were personally reviewed  Results from last 7 days   Lab Units 09/27/22  0909 09/26/22  1025 09/25/22  1001   WBC Thousand/uL 11 31* 14 50* 18 02*   HEMOGLOBIN g/dL 8 0* 8 0* 9 3*   PLATELETS Thousands/uL 419* 483* 482*     Results from last 7 days   Lab Units 09/28/22  1043 09/27/22  0909 09/26/22  1025 09/24/22  1746 09/23/22  2244   POTASSIUM mmol/L 5 7*   < > 5 2   < > 5 3   CHLORIDE mmol/L 102   < > 101   < > 99   CO2 mmol/L 24   < > 24   < > 24   BUN mg/dL 23   < > 24   < > 29*   CREATININE mg/dL 1 49*   < > 1 30   < > 1 54*   EGFR ml/min/1 73sq m 33   < > 39   < > 31   CALCIUM mg/dL 8 8   < > 7 7*   < > 8 3   AST U/L 52*  --  129*  --  63*   ALT U/L 47  --  83*  --  53   ALK PHOS U/L 156*  --  206*  --  211*    < > = values in this interval not displayed  Results from last 7 days   Lab Units 09/26/22  1221 09/25/22  1642 09/23/22  1601 09/23/22  1558   BLOOD CULTURE  No Growth at 24 hrs  No Growth at 24 hrs   --   --  No Growth After 4 Days  No Growth After 4 Days     URINE CULTURE   --   --  >100,000 cfu/ml Proteus vulgaris*  >100,000 cfu/ml Citrobacter freundii*  >100,000 cfu/ml Klebsiella oxytoca ESBL*  >100,000 cfu/ml Morganella morganii*  --    C DIFF TOXIN B BY PCR   --  Negative  --   --

## 2022-09-28 NOTE — ASSESSMENT & PLAN NOTE
Recurrent sepsis  Presented with fever, tachypnea, tachycardia leukocytosis  Patient presented with PICC line which was in place for TPN, patient was treated for presumed line infection  Repeat blood cultures negative  Was also treated for possible C diff infection and remains on prophylactic vancomycin  Doppler with right forearm superficial thrombophlebitis  Had isolated fever 9/23 therefore id was reconsulted, patient was having reports of dysuria  · Id following, currently on intravenous ertapenem for ESBL UTI through tomorrow AM 9/29 for a total of 3 doses  · Repeat CT chest abdomen pelvis 9/27-for acute infectious etiology, incidental findings as below  · Repeat blood cultures no growth to date  · COVID-19 PCR negative  · Trend temps and leukocytosis  Has been afebrile since 09/26  · Plan for discharge to rehab tomorrow, case management following  Requested transport for tomorrow

## 2022-09-28 NOTE — UTILIZATION REVIEW
Continued Stay Review    Date: 09/28/2022                          Current Patient Class:   Current Level of Care: MS    HPI:77 y o  female initially admitted on 08/31/2022 d/t Colitis, ambulatory dysfunction, NOAH, acute cystitis, elevated LFT's  Assessment/Plan: Pt w/ no acute complaints  Answers questions appropriately  MS waxes and wanes  Tearful and frustrated for lack of mobility  PEG tube in place  Repeat bld cxs NGTD  On IV Ertapanem for ESBL UTI 2/3  Cont to trend temps and WBC  Cr stable  Cont po Vanco ppx through 10/02  Cont retention protocol  Cont pain regimen  Cont [place]  PT/OT  CM working for placement  Vital Signs: BP 99/50 (BP Location: Right arm)   Pulse 82   Temp 97 6 °F (36 4 °C) (Oral)   Resp 16   Ht 5' 4" (1 626 m)   Wt 48 3 kg (106 lb 7 7 oz)   SpO2 91%   BMI 18 28 kg/m²       Pertinent Labs/Diagnostic Results:   09/26 CT chest abdomen pelvis:   1  Patchy and groundglass opacities in the upper lobes  The appearance is most typical for pulmonary edema though in the setting of systemic inflammatory response syndrome, atypical infection is not excluded      2   Small bilateral pleural effusions and trace ascites      3  Focal thickening of the lower portion of the endometrium  While this is not expected in a patient of this age, this appears stable from 2016 suggesting a benign etiology  If there is clinical concern, follow-up ultrasound is recommended      4  Cholelithiasis      5   Small left upper lobe nodules, one of which is new from March 2022  Given the history of smoking, a follow-up chest CT is recommended in 12 months        Results from last 7 days   Lab Units 09/26/22  1217   SARS-COV-2  Negative     Results from last 7 days   Lab Units 09/28/22  1512 09/27/22  0909 09/26/22  1025 09/25/22  1001 09/24/22  1746   WBC Thousand/uL 13 42* 11 31* 14 50* 18 02* 24 02*   HEMOGLOBIN g/dL 7 8* 8 0* 8 0* 9 3* 6 2*   HEMATOCRIT % 25 1* 25 6* 26 2* 29 6* 20 0*   PLATELETS Thousands/uL 492* 419* 483* 482* 492*   NEUTROS ABS Thousands/µL 9 15* 7 41 10 60* 13 85* 18 96*         Results from last 7 days   Lab Units 09/28/22  1512 09/28/22  1043 09/27/22  0909 09/26/22  1025 09/25/22  0812   SODIUM mmol/L 132* 131* 134* 129* 129*   POTASSIUM mmol/L 4 7 5 7* 4 9 5 2 5 1   CHLORIDE mmol/L 102 102 100 101 103   CO2 mmol/L 24 24 25 24 22   ANION GAP mmol/L 6 5 9 4 4   BUN mg/dL 23 23 25 24 27*   CREATININE mg/dL 1 57* 1 49* 1 36* 1 30 1 32*   EGFR ml/min/1 73sq m 31 33 37 39 38   CALCIUM mg/dL 8 1* 8 8 8 4 7 7* 8 2*     Results from last 7 days   Lab Units 09/28/22  1512 09/28/22  1043 09/26/22  1025 09/23/22  2244 09/23/22  1601   AST U/L 30 52* 129* 63* 84*   ALT U/L 42 47 83* 53 61   ALK PHOS U/L 144* 156* 206* 211* 228*   TOTAL PROTEIN g/dL 7 2 7 5 7 2 7 7 7 8   ALBUMIN g/dL 1 5* 1 7* 1 5* 1 9* 1 8*   TOTAL BILIRUBIN mg/dL 0 29 0 35 0 30 0 21 0 34   BILIRUBIN DIRECT mg/dL  --   --   --   --  0 16     Results from last 7 days   Lab Units 09/28/22  1245 09/27/22  1303 09/27/22  0610 09/27/22  0003 09/26/22  1758 09/26/22  1203 09/26/22  0530 09/25/22  1619 09/25/22  1144 09/25/22  0008 09/24/22  1805 09/24/22  0538   POC GLUCOSE mg/dl 98 100 113 143* 97 104 132 95 148* 144* 109 124     Results from last 7 days   Lab Units 09/28/22  1512 09/28/22  1043 09/27/22  0909 09/26/22  1025 09/25/22  0812 09/24/22  1746 09/23/22  2244 09/23/22  1601 09/22/22  1218   GLUCOSE RANDOM mg/dL 134 109 108 141* 116 102 115 111 86         Results from last 7 days   Lab Units 09/23/22  1601   LACTIC ACID mmol/L 1 4           Results from last 7 days   Lab Units 09/25/22  1805 09/23/22  1601   CLARITY UA   --  Turbid   COLOR UA   --  Yellow   SPEC GRAV UA   --  1 013   PH UA   --  8 0   GLUCOSE UA mg/dl  --  Negative   KETONES UA mg/dl  --  Negative   BLOOD UA   --  Trace*   PROTEIN UA mg/dl  --  30 (1+)*   NITRITE UA   --  Positive*   BILIRUBIN UA   --  Negative   UROBILINOGEN UA (BE) mg/dl  --  <2 0 LEUKOCYTES UA   --  Trace*   WBC UA /hpf  --  10-20*   RBC UA /hpf  --  1-2   BACTERIA UA /hpf  --  None Seen   EPITHELIAL CELLS WET PREP /hpf  --  None Seen   SODIUM UR  99  --    CREATININE UR mg/dL 31 7  --      Results from last 7 days   Lab Units 09/26/22  1217   INFLUENZA A PCR  Negative   INFLUENZA B PCR  Negative   RSV PCR  Negative                 Results from last 7 days   Lab Units 09/25/22  1642   C DIFF TOXIN B BY PCR  Negative             Results from last 7 days   Lab Units 09/26/22  1221 09/23/22  1601 09/23/22  1558   BLOOD CULTURE  No Growth at 48 hrs  No Growth at 48 hrs  --  No Growth After 4 Days  No Growth After 4 Days     URINE CULTURE   --  >100,000 cfu/ml Proteus vulgaris*  >100,000 cfu/ml Citrobacter freundii*  >100,000 cfu/ml Klebsiella oxytoca ESBL*  >100,000 cfu/ml Morganella morganii*  --                  Medications:   Scheduled Medications:  alteplase, 2 mg, Intracatheter, Once  bisacodyl, 10 mg, Rectal, Once  clonazePAM, 3 mg, Oral, HS  COVID-19 Pfizer vac bivalent jerri-sucrose, 0 3 mL, Intramuscular, Once  ertapenem, 500 mg, Intravenous, C22C  folic acid, 1 mg, Oral, Daily  guaiFENesin, 600 mg, Oral, Q12H RHONA  heparin (porcine), 5,000 Units, Subcutaneous, Q8H [facility] [place]  levothyroxine, 112 mcg, Oral, Early Morning  [START ON 9/29/2022] midodrine, 10 mg, Oral, TID AC  oxyCODONE, 5 mg, Oral, Q6H  pantoprazole, 40 mg, Oral, BID AC  polyethylene glycol, 17 g, Per PEG Tube, BID  senna, 2 tablet, Oral, HS  sodium chloride, 1 g, Oral, BID With Meals  sucralfate, 1 g, Oral, 4x Daily (AC & HS)  thiamine, 100 mg, Oral, Daily  traZODone, 150 mg, Oral, HS  vancomycin, 125 mg, Oral, Q12H RHONA      Continuous IV Infusions:     PRN Meds:  acetaminophen, 975 mg, Oral, Q6H PRN  ALPRAZolam, 0 25 mg, Oral, TID PRN  bisacodyl, 10 mg, Rectal, Daily PRN  magnesium hydroxide, 30 mL, Oral, Daily PRN  metoclopramide, 10 mg, Intravenous, Q6H PRN  ondansetron, 4 mg, Intravenous, Q6H PRN        Discharge Plan: D    Network Utilization Review Department  ATTENTION: Please call with any questions or concerns to [phone] and carefully listen to the prompts so that you are directed to the right person  All voicemails are confidential   Con Smith all requests for admission clinical reviews, approved or denied determinations and any other requests to dedicated fax number below belonging to the campus where the patient is receiving treatment   List of dedicated fax numbers for the Facilities:  [address] DENIALS (Administrative/Medical Necessity) [phone]   [address] (Maternity/NICU/Pediatrics) [phone]   [facility] Yessy Liriano [phone]   [facility] [place] [address] [phone]   [address] [facility] [place] [address] [address] [place] [address] 28 [phone]   [address] [place] [facility] 134 815 [facility] [phone]

## 2022-09-28 NOTE — PHYSICAL THERAPY NOTE
PHYSICAL THERAPY TREATMENT  NAME:  Crow Black  DATE: 09/28/22    AGE:   76 y o   Mrn:   [id number]  ADMIT DX:  Diarrhea of presumed infectious origin [R19 7]  Fatigue [R53 83]  C  difficile diarrhea [A04 72]  Sepsis ([facility] 75 ) [A41 9]    Past Medical History:   Diagnosis Date    Anemia     Anxiety     Bipolar disorder ([facility] 75 )     Colon polyp     Disease of thyroid gland     Essential hypertension     Essential tremor     Fibromyalgia, primary     GERD (gastroesophageal reflux disease)     Inflammatory polyarthropathy (HCC)     Mammogram abnormal     Pressure injury of skin      Past Surgical History:   Procedure Laterality Date    BREAST BIOPSY Right 2015    benign    CARPAL TUNNEL RELEASE Bilateral     COLONOSCOPY      EGD AND COLONOSCOPY  01/01/2014    GASTRIC BYPASS  07/01/2012    MAMMO NEEDLE LOCALIZATION RIGHT (ALL INC) Right 2/6/2012    MAMMO NEEDLE LOCALIZATION RIGHT (ALL INC) Right 2/6/2012    ULNAR NERVE TRANSPOSITION Right        Length Of Stay: 28 09/28/22 1223   PT Last Visit   PT Visit Date 09/28/22   Note Type   Note Type Treatment for insurance authorization   Pain Assessment   Pain Assessment Tool 0-10   Pain Score 5   Pain Location/Orientation Location: Generalized   Pain Onset/Description Frequency: Intermittent   Effect of Pain on Daily Activities inc time for participation   Patient's Stated Pain Goal No pain   Hospital Pain Intervention(s) Repositioned; Ambulation/increased activity; Emotional support   Restrictions/Precautions   Weight Bearing Precautions Per Order No   Other Precautions Cognitive; Chair Alarm; Bed Alarm;Multiple lines;Pain; Fall Risk;Impulsive;Contact/isolation   General   Chart Reviewed Yes   Response to Previous Treatment Patient reporting fatigue but able to participate     Family/Caregiver Present No   Cognition   Overall Cognitive Status Impaired   Arousal/Participation Responsive   Attention Attends with cues to redirect   Orientation Level Oriented to person;Oriented to place   Memory Unable to assess   Following Commands Follows one step commands with increased time or repetition   Comments pt was cooperatiive w/ PT this date- motivated to get OOB as she really wants her sheets changed   Subjective   Subjective "I feel like a cripple "   Bed Mobility   Supine to Sit 4  Minimal assistance   Additional items Assist x 1; Increased time required;Verbal cues   Additional Comments pt OOB in recliner post session   Transfers   Sit to Stand 3  Moderate assistance   Additional items Assist x 1; Increased time required;Verbal cues   Stand to Sit 3  Moderate assistance   Additional items Assist x 1; Increased time required;Verbal cues   Additional Comments standing trial x5 from recliner surface- inc time + assist for balance w/ pt frequently LOB posterior  Pt does become frustrated w/ self and time + encouragemetn requried for calming + redirection   Ambulation/Elevation   Gait pattern Excessively slow; Step to;Ataxia; Short stride;Decreased foot clearance; Improper Weight shift   Gait Assistance 3  Moderate assist   Additional items Assist x 1   Assistive Device Rolling walker   Distance 3+4' advance retreat from chair w/ RW- mod A for balance required for frequent LOBposterior  Pt is highly focused on walking and time was requried on education for improved sit<>stands + balance prior to advancing to increased distances- pt w/ poor insight and requiring frequent redirection to therapeutic tasks     Stair Management Assistance Not tested   Balance   Static Sitting Fair   Dynamic Sitting Poor +   Static Standing Poor   Dynamic Standing Poor   Ambulatory Poor -  (rw)   Endurance Deficit   Endurance Deficit Yes   Endurance Deficit Description fatigue + weakness   Activity Tolerance   Activity Tolerance Patient limited by fatigue;Patient limited by pain   Medical Staff Made Aware RN/ CM Yue Louis   Nurse Made Aware RN clears pt for OOB and participation w/ PT   Exercises   [facility] Supine;15 reps   Hip Abduction Supine;15 reps   Hip Adduction Supine;15 reps   Knee AROM Long Arc Quad Sitting;10 reps;Right;Left  (x2)   Ankle Pumps Supine;25 reps   Balance Training 5 reps; Sitting;Standing  (sit<> stnad + marching (10 reps each stand) w/ modA for lyndsay)   Assessment   Prognosis Fair   Problem List Decreased strength;Decreased range of motion;Decreased endurance; Impaired balance;Decreased mobility; Decreased coordination;Decreased cognition;Decreased safety awareness; Impaired judgement;Decreased skin integrity;Pain   Assessment Pt seen for bed mob; transfers; seated and standing balance + gait w/ RW  Pt requriing inc time + encouragemetn for participation; but was motivated throughout  Pt did require frequent redirection as she becomes upset w/ "not being able to walk myself  Im a cripple "  t was able to be redirected to therapy w/ time and explaination on appropriate progression (stnading balance + marching w/ RW)  Pt was able to completed sit< stnad from chair w/ min> moD A  Pt fatigues easily but overall continues to make progress and is participating in therapies w/ goal of "walking"  Rehab on d/c recommended  POC+ goals extended an additional 14 days  goals appropriate w/ extended time frame   Barriers to Discharge Inaccessible home environment   Goals   Patient Goals to walk by myself   STG Expiration Date 10/12/22   PT Treatment Day 1   Plan   Treatment/Interventions ADL retraining;Functional transfer training;LE strengthening/ROM; Elevations; Therapeutic exercise; Endurance training;Cognitive reorientation;Patient/family training;Equipment eval/education; Bed mobility;Gait training; Compensatory technique education;Continued evaluation;Spoke to nursing;Spoke to case management   Progress Progressing toward goals   PT Frequency 2-3x/wk   Recommendation   PT Discharge Recommendation Return to facility with rehabilitation services   Albert 8 in Bed Without Bedrails 3   Lying on Back to Sitting on Edge of Flat Bed 3   Moving Bed to Chair 2   Standing Up From Chair 2   Walk in Room 2   Climb 3-5 Stairs 1   Basic Mobility Inpatient Raw Score 13   Basic Mobility Standardized Score 33 99   Turning Head Towards Sound 4   Follow Simple Instructions 3   Low Function Basic Mobility Raw Score 20   Low Function Basic Mobility Standardized Score 32 8   Highest Level Of Mobility   JH-HLM Goal 4: Move to chair/commode   JH-HLM Achieved 4: Move to chair/commode   Education   Education Provided Mobility training   Patient Reinforcement needed   End of Consult   Patient Position at End of Consult Supine; Bedside chair;Bed/Chair alarm activated; All needs within reach     The patient's AM-PAC Basic Mobility Inpatient Short Form Raw Score is 13  A Raw score of less than or equal to 16 suggests the patient may benefit from discharge to post-acute rehabilitation services  Please also refer to the recommendation of the Physical Therapist for safe discharge planning              Nadine Kelley

## 2022-09-28 NOTE — PROGRESS NOTES
[facility]  Progress Note - Teja Rivera 1945, 76 y o  female MRN: [id number]  Unit/Bed#: [facility] 317-01 Encounter: [id number]  Primary Care Provider: Issac Lyn MD   Date and time admitted to hospital: 8/30/2022 11:49 PM    * Sepsis [facility])  Assessment & Plan  Recurrent sepsis  Presented with fever, tachypnea, tachycardia leukocytosis  Patient presented with PICC line which was in place for TPN, patient was treated for presumed line infection  Repeat blood cultures negative  Was also treated for possible C diff infection and remains on prophylactic vancomycin  Doppler with right forearm superficial thrombophlebitis  Had isolated fever 9/23 therefore id was reconsulted, patient was having reports of dysuria  · Id following, currently on intravenous ertapenem for ESBL UTI through tomorrow AM 9/29 for a total of 3 doses  · Repeat CT chest abdomen pelvis 9/27-for acute infectious etiology, incidental findings as below  · Repeat blood cultures no growth to date  · COVID-19 PCR negative  · Trend temps and leukocytosis  Has been afebrile since 09/26  · Plan for discharge to rehab tomorrow, case management following  Requested transport for tomorrow  Bacteremia  Assessment & Plan  Recent hx of staph epi bacteremia in 7/2022, presumed due to picc line  · recurrence 1/2 set staph epi, 2nd set without growth  Was treated with intravenous vancomycin for presumed line infection  · S/p TAVIA (9/9) no evidence of vegetation  · Blood cultures had been negative however repeated again due to isolated fever which are negative to date  · PICC and TPN discontinued, now has PEG tube  NOAH (acute kidney injury) ([facility] [address] )  Assessment & Plan  · Now stable within more recent baseline of 1 3-1 4    · Avoid nephrotoxins and hypotension  · Labs pending today, additional labs as needed    H/O bariatric surgery - bypass  Assessment & Plan  Patient with history of gastric bypass surgery  Has had significant malnutrition issues and anastomotic ulcerations  Seen by Bariatric Service during recent hospitalization at [facility] who recommended prolonged course of TPN for nutritional optimization and possible revision of surgery in the future  · Was planned to have appt with bariatric sx on 8/20 however had to be rescheduled  · Given recurrent bacteremia; s/p GI consult; recommended discontinuation of TPN  S/p PEG, TF initiated- nutrition adjusted rate of TF  · P O  Diet for pleasure feeding resumed  · To f/u with bariatric surgery      Acute encephalopathy  Assessment & Plan  · Patient waxes and wanes  · ? From prolonged hospital stay with  induced delirium vs metabolic encephalopathy  · No focal deficit on exam  · Delirium precautions      Hyponatremia  Assessment & Plan  · Sodium 134  Was started on Na Cl tablets this admission  · Monitor as needed      Diarrhea  Assessment & Plan  · Recent PCR test negative  Had recent C diff infection  Continue C diff prophylaxis while on intravenous antibiotics and for 72 hours thereafter  Left upper quadrant abdominal pain-resolved as of 9/28/2022  Assessment & Plan  · Improved  Retention disc from PEG was loosened by Gastroenterology  · Also with concern for constipation on bowel regimen  · CT negative for acute findings    Urinary retention  Assessment & Plan  · Has required intermittent catheterizations however now voiding spontaneously  · Continue retention protocol    Moderate protein-calorie malnutrition ([facility] [address] )  Assessment & Plan  Malnutrition Findings:   Adult Malnutrition type: Chronic illness  Adult Degree of Malnutrition: Other severe protein calorie malnutrition  Malnutrition Characteristics: Weight loss, Muscle loss       360 Statement: Severe/Chronic malnutrition r/t condition, as evidenced by 4 8% wt loss x < 1 week (9/2/22: 125#, 9/5/22: 119#), and severe muscle mass depletion (temples/clavicle)   Treated with liberalized diet and nutrition supplements, possibly nutrition support pending goals of care    BMI Findings: Body mass index is 18 28 kg/m²  Hx of Savage-en Y gastric bypass, 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  Now off due to recurrent bacteremia  S/p PEG placement and on PO feeds  Cont nutritional supplements    Ambulatory dysfunction  Assessment & Plan  · Exacerbated by diarrhea/dehydration however patient is chronically malnourished  · S/p PT/OT evaluation; to return to SNF  · Encouraged participation with PT/OT      Acute on chronic anemia  Assessment & Plan  · Evaluated by GI prior  · 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  · S/p recent EGD in 7/22 revealing: Residual marginal ulcer of the gastrojejunostomy anastomosis with improved size  · S/p EGD 9/2 revealing: Large, cratered ulcer in the gastrojejunal anastomosis   · Received PRBC transfusion x 1 this admission,  continue monitoring transfuse less than 7  · Cont PPI, BID, carafate    Transaminitis  Assessment & Plan  · NO RUQ pain, USG with gallstones, no choelcystitis  · Will trend    Abnormal CT scan  Assessment & Plan  Noted on CT C/A/P 9/26  · Focal thickening of the lower portion of the endometrium  While this is not expected in a patient of this age, this appears stable from 2016 suggesting a benign etiology  If there is clinical concern, follow-up ultrasound is recommended  · Small left upper lobe nodules, one of which is new from March 2022  Given the history of smoking, a follow-up chest CT is recommended in 12 months  · Discussed with sister    Goals of care, counseling/discussion  Assessment & Plan  · Previously evaluated by palliative care  Wants to continue medical directed treatments with limits of DNR/DNI  · Continue current pain management regimen with scheduled oxycodone and additional p r n  Doses  Should follow-up with palliative Care as outpatient  Hypotension  Assessment & Plan  · BP on soft side, asymptomatic  · Continue midodrine      VTE Pharmacologic Prophylaxis: VTE Score: 3 Moderate Risk (Score 3-4) - Pharmacological DVT Prophylaxis Ordered: heparin  Patient Centered Rounds: I performed bedside rounds with nursing staff today  Discussions with Specialists or Other Care Team Provider: nursing, case management Brinda Wilson, asked to set up transport for tomorrow)    Education and Discussions with Family / Patient: Attempted to update  (sister) via phone  Left voicemail  did not leave incidental findings update on VM    Time Spent for Care: 30 minutes  More than 50% of total time spent on counseling and coordination of care as described above  Current Length of Stay: 28 day(s)  Current Patient Status: Inpatient     Certification Statement: The patient will continue to require additional inpatient hospital stay due to pending completion of IV abx tomorrow     Discharge Plan: Anticipate discharge tomorrow to rehab facility  Code Status: Level 3 - DNAR and DNI    Subjective:   Patient offers no acute complaints  Pleasant cooperative  Answers orientation questions appropriately  Denies pain  Objective:     Vitals:   Temp (24hrs), Av 2 °F (36 8 °C), Min:98 1 °F (36 7 °C), Max:98 2 °F (36 8 °C)    Temp:  [98 1 °F (36 7 °C)-98 2 °F (36 8 °C)] 98 1 °F (36 7 °C)  HR:  [] 83  Resp:  [15-16] 16  BP: ()/(49-54) 96/49  SpO2:  [92 %-98 %] 98 %  Body mass index is 18 28 kg/m²  Input and Output Summary (last 24 hours): Intake/Output Summary (Last 24 hours) at 2022 1113  Last data filed at 2022 1515  Gross per 24 hour   Intake --   Output 350 ml   Net -350 ml       Physical Exam:   Physical Exam  Vitals and nursing note reviewed  Constitutional:       General: She is not in acute distress  Appearance: She is ill-appearing (Chronically ill-appearing)  Comments: On room air   Cardiovascular:      Rate and Rhythm: Normal rate     Pulmonary:      Comments: Few scattered wheezes  Abdominal:      Palpations: Abdomen is soft  Tenderness: There is no abdominal tenderness  Comments: PEG tube in place   Musculoskeletal:         General: Swelling (Mild, right lower extremity) present  Normal range of motion  Skin:     General: Skin is warm  Coloration: Skin is pale  Neurological:      Mental Status: She is alert and oriented to person, place, and time  Mental status is at baseline  Comments:  Answers orientation questions appropriately, lacks insight, mental status certainly waxes and wanes   Psychiatric:         Mood and Affect: Mood normal           Additional Data:     Labs:  Results from last 7 days   Lab Units 09/27/22  0909   WBC Thousand/uL 11 31*   HEMOGLOBIN g/dL 8 0*   HEMATOCRIT % 25 6*   PLATELETS Thousands/uL 419*   NEUTROS PCT % 66   LYMPHS PCT % 16   MONOS PCT % 7   EOS PCT % 10*     Results from last 7 days   Lab Units 09/27/22  0909 09/26/22  1025   SODIUM mmol/L 134* 129*   POTASSIUM mmol/L 4 9 5 2   CHLORIDE mmol/L 100 101   CO2 mmol/L 25 24   BUN mg/dL 25 24   CREATININE mg/dL 1 36* 1 30   ANION GAP mmol/L 9 4   CALCIUM mg/dL 8 4 7 7*   ALBUMIN g/dL  --  1 5*   TOTAL BILIRUBIN mg/dL  --  0 30   ALK PHOS U/L  --  206*   ALT U/L  --  83*   AST U/L  --  129*   GLUCOSE RANDOM mg/dL 108 141*         Results from last 7 days   Lab Units 09/27/22  1303 09/27/22  0610 09/27/22  0003 09/26/22  1758 09/26/22  1203 09/26/22  0530 09/25/22  1619 09/25/22  1144 09/25/22  0008 09/24/22  1805 09/24/22  0538 09/23/22  2345   POC GLUCOSE mg/dl 100 113 143* 97 104 132 95 148* 144* 109 124 124         Results from last 7 days   Lab Units 09/23/22  1601   LACTIC ACID mmol/L 1 4       Lines/Drains:  Invasive Devices  Report    Peripheral Intravenous Line  Duration           Peripheral IV 09/24/22 Proximal;Right;Ventral (anterior) Forearm 3 days          Drain  Duration           Gastrostomy/Enterostomy Percutaneous Endoscopic Gastrostomy (PEG) 20 Fr  LUQ 19 days Imaging: No pertinent imaging reviewed  Recent Cultures (last 7 days):   Results from last 7 days   Lab Units 09/26/22  1221 09/25/22  1642 09/23/22  1601 09/23/22  1558   BLOOD CULTURE  No Growth at 24 hrs  No Growth at 24 hrs   --   --  No Growth After 4 Days  No Growth After 4 Days     URINE CULTURE   --   --  >100,000 cfu/ml Proteus vulgaris*  >100,000 cfu/ml Citrobacter freundii*  >100,000 cfu/ml Klebsiella oxytoca ESBL*  >100,000 cfu/ml Morganella morganii*  --    C DIFF TOXIN B BY PCR   --  Negative  --   --        Last 24 Hours Medication List:   Current Facility-Administered Medications   Medication Dose Route Frequency Provider Last Rate   • acetaminophen  975 mg Oral Q6H PRN Liss Abreu PA-C     • ALPRAZolam  0 25 mg Oral TID PRN Sangeetha Huddleston DO     • alteplase  2 mg Intracatheter Once Reliant YNES Sevilla     • bisacodyl  10 mg Rectal Daily PRN Blane Darden MD     • bisacodyl  10 mg Rectal Once Lissette Arellano MD     • clonazePAM  3 mg Oral HS Jovita Mcdermott DO     • ertapenem  500 mg Intravenous Q24H Gilmar Tenorio  mg (32/58/18 2973)   • folic acid  1 mg Oral Daily Jovita Mcdermott DO     • guaiFENesin  600 mg Oral Q12H [facility] Liss Abreu PA-C     • heparin (porcine)  5,000 Units Subcutaneous Q8H [facility] Sangeetha Huddleston DO     • levothyroxine  112 mcg Oral Early Morning Jovita Mcdermott DO     • magnesium hydroxide  30 mL Oral Daily PRN Blane Darden MD     • metoclopramide  10 mg Intravenous Q6H PRN Blane Darden MD     • midodrine  5 mg Oral TID AC Jovita Mcdermott DO     • ondansetron  4 mg Intravenous Q6H PRN Remy Angela DO     • oxyCODONE  5 mg Oral Q6H Blane Darden MD     • pantoprazole  40 mg Oral BID AC Kaylan Arroyo DO     • polyethylene glycol  17 g Per PEG Tube BID Hershell Punter, DO     • senna  2 tablet Oral HS Josephine Curiel MD     • sodium chloride  1 g Oral BID With Meals TOLU Orellana     • sucralfate  1 g Oral 4x Daily [facility]) Karol Lara, DO     • thiamine  100 mg Oral Daily Jovita Mcdermott DO     • traZODone  150 mg Oral HS Jovita Mcdermott DO     • vancomycin  125 mg Oral Q12H [facility] Hany Abarca MD          Today, Patient Was Seen By: TOLU Leon    **Please Note: This note may have been constructed using a voice recognition system  **

## 2022-09-28 NOTE — ASSESSMENT & PLAN NOTE
· Now stable within more recent baseline of 1 3-1 4    · Avoid nephrotoxins and hypotension  · Labs pending today, additional labs as needed

## 2022-09-28 NOTE — NURSING NOTE
Attempt to check to see if the patient was incontinent or not was unsuccessful, for the patient swings at any approach and verbally abusive screaming "get out" with any advance  Patient continues to refuse medications as well and denies pain

## 2022-09-28 NOTE — PLAN OF CARE
Problem: PHYSICAL THERAPY ADULT  Goal: Performs mobility at highest level of function for planned discharge setting  See evaluation for individualized goals  Description:    Equipment Recommended:  (RW)       See flowsheet documentation for full assessment, interventions and recommendations  9/28/2022 1326 by Sara Lopez PT  Outcome: Progressing  Note: Prognosis: Fair  Problem List: Decreased strength, Decreased range of motion, Decreased endurance, Impaired balance, Decreased mobility, Decreased coordination, Decreased cognition, Decreased safety awareness, Impaired judgement, Decreased skin integrity, Pain  Assessment: Pt seen for bed mob; transfers; seated and standing balance + gait w/ RW  Pt requriing inc time + encouragemetn for participation; but was motivated throughout  Pt did require frequent redirection as she becomes upset w/ "not being able to walk myself  Im a cripple "  t was able to be redirected to therapy w/ time and explaination on appropriate progression (stnading balance + marching w/ RW)  Pt was able to completed sit< stnad from chair w/ min> moD A  Pt fatigues easily but overall continues to make progress and is participating in therapies w/ goal of "walking"  Rehab on d/c recommended  POC+ goals extended an additional 14 days  goals appropriate w/ extended time frame  Barriers to Discharge: Inaccessible home environment     PT Discharge Recommendation: Return to facility with rehabilitation services    See flowsheet documentation for full assessment  9/28/2022 1326 by Sara Lopez PT  Note: Prognosis: Fair  Problem List: Decreased strength, Decreased range of motion, Decreased endurance, Impaired balance, Decreased mobility, Decreased coordination, Decreased cognition, Decreased safety awareness, Impaired judgement, Decreased skin integrity, Pain  Assessment: Pt seen for bed mob; transfers; seated and standing balance + gait w/ RW   Pt requriing inc time + encouragemetn for participation; but was motivated throughout  Pt did require frequent redirection as she becomes upset w/ "not being able to walk myself  Im a cripple "  t was able to be redirected to therapy w/ time and explaination on appropriate progression (stnading balance + marching w/ RW)  Pt was able to completed sit< stnad from chair w/ min> moD A  Pt fatigues easily but overall continues to make progress and is participating in therapies w/ goal of "walking"  Rehab on d/c recommended  POC+ goals extended an additional 14 days  goals appropriate w/ extended time frame  Barriers to Discharge: Inaccessible home environment     PT Discharge Recommendation: Return to facility with rehabilitation services    See flowsheet documentation for full assessment

## 2022-09-28 NOTE — ASSESSMENT & PLAN NOTE
Noted on CT C/A/P 9/26  · Focal thickening of the lower portion of the endometrium  While this is not expected in a patient of this age, this appears stable from 2016 suggesting a benign etiology  If there is clinical concern, follow-up ultrasound is recommended  · Small left upper lobe nodules, one of which is new from March 2022  Given the history of smoking, a follow-up chest CT is recommended in 12 months      · Sister needs to be updated on this, was unable to reach 9/28

## 2022-09-28 NOTE — CASE MANAGEMENT
Case Management Discharge Planning Note    Patient name Lars Mane  Location [facility] 899-05 MRN [id number]  : 1945 Date 2022       Current Admission Date: 2022  Current Admission Diagnosis:Sepsis [facility])   Patient Active Problem List    Diagnosis Date Noted   • Hypotension 2022   • Urinary retention 2022   • Transaminitis 2022   • Acute encephalopathy 2022   • Moderate protein-calorie malnutrition ([address] ) 2022   • Bacteremia 2022   • Acute on chronic anemia 2022   • Goals of care, counseling/discussion 2022   • Colitis presumed to be due to infection 2022   • Acute cystitis without hematuria 2022   • Acute kidney insufficiency 2022   • Fall 2022   • Diarrhea 2022   • Sepsis ([address] ) 2022   • Sacral ulcer ([address] ) 2022   • Gram-positive bacteremia 2022   • Severe protein-calorie malnutrition ([address] ) 2022   • Bilateral leg edema 2022   • Ambulatory dysfunction 2022   • Acute blood loss anemia 2022   • NOAH (acute kidney injury) ([address] ) 2022   • Gastric ulcer 2022   • Fever 2022   • Anemia 2022   • Dyspnea on exertion 2022   • Generalized weakness 2022   • Elevated LFTs 2022   • Hyponatremia 2022   • Abnormal CT scan 2022   • H/O bariatric surgery - bypass 2022   • Cerumen debris on tympanic membrane of right ear 2022   • Nasal congestion 2022   • Bilateral hearing loss 2022   • Fibromyalgia syndrome 2021   • Osteopenia of both forearms 2021   • Medicare annual wellness visit, subsequent 2021   • Shortness of breath 2021   • Acute bronchitis 2021   • COVID-19 2021   • Dysphasia 2020   • Epigastric pain 2020   • Hypothyroidism 2020   • Gastroesophageal reflux disease without esophagitis 2020   • Depression 2020   • Fibromyalgia, primary 11/13/2020   • Iron deficiency 11/13/2020   • Numbness of foot 11/13/2020      LOS (days): 28  Geometric Mean LOS (GMLOS) (days): 4 80  Days to GMLOS:-23 5     OBJECTIVE:  Risk of Unplanned Readmission Score: 39 64         Current admission status: Inpatient   Preferred Pharmacy:   [place] [address] [address] [facility], [address] [place] [address] [address] [facility] [address]  Phone: [phone] Fax: [address] [facility], [address] [place] [address]  Phone: [phone] Fax: [phone]    Primary Care Provider: Valencia Paris MD    Primary Insurance: [facility]  Secondary Insurance:     DISCHARGE DETAILS:                    IMM Given (Date):: 09/28/22  IMM Given to[de-identified] Patient

## 2022-09-29 NOTE — PLAN OF CARE
Problem: Potential for Falls  Goal: Patient will remain free of falls  Description: INTERVENTIONS:  - Educate patient/family on patient safety including physical limitations  - Instruct patient to call for assistance with activity   - Consult OT/PT to assist with strengthening/mobility   - Keep Call bell within reach  - Keep bed low and locked with side rails adjusted as appropriate  - Keep care items and personal belongings within reach  - Initiate and maintain comfort rounds  - Make Fall Risk Sign visible to staff  - Offer Toileting every Hours, in advance of need  - Initiate/Maintain alarm  - Obtain necessary fall risk management equipment:   - Apply yellow socks and bracelet for high fall risk patients  - Consider moving patient to room near nurses station  Outcome: Progressing     Problem: MOBILITY - ADULT  Goal: Maintain or return to baseline ADL function  Description: INTERVENTIONS:  -  Assess patient's ability to carry out ADLs; assess patient's baseline for ADL function and identify physical deficits which impact ability to perform ADLs (bathing, care of mouth/teeth, toileting, grooming, dressing, etc )  - Assess/evaluate cause of self-care deficits   - Assess range of motion  - Assess patient's mobility; develop plan if impaired  - Assess patient's need for assistive devices and provide as appropriate  - Encourage maximum independence but intervene and supervise when necessary  - Involve family in performance of ADLs  - Assess for home care needs following discharge   - Consider OT consult to assist with ADL evaluation and planning for discharge  - Provide patient education as appropriate  Outcome: Progressing     Problem: SAFETY ADULT  Goal: Patient will remain free of falls  Description: INTERVENTIONS:  - Educate patient/family on patient safety including physical limitations  - Instruct patient to call for assistance with activity   - Consult OT/PT to assist with strengthening/mobility   - Keep Call bell within reach  - Keep bed low and locked with side rails adjusted as appropriate  - Keep care items and personal belongings within reach  - Initiate and maintain comfort rounds  - Make Fall Risk Sign visible to staff  - Offer Toileting every  Hours, in advance of need  - Initiate/Maintain alarm  - Obtain necessary fall risk management equipment:   - Apply yellow socks and bracelet for high fall risk patients  - Consider moving patient to room near nurses station  Outcome: Progressing  Goal: Maintain or return to baseline ADL function  Description: INTERVENTIONS:  -  Assess patient's ability to carry out ADLs; assess patient's baseline for ADL function and identify physical deficits which impact ability to perform ADLs (bathing, care of mouth/teeth, toileting, grooming, dressing, etc )  - Assess/evaluate cause of self-care deficits   - Assess range of motion  - Assess patient's mobility; develop plan if impaired  - Assess patient's need for assistive devices and provide as appropriate  - Encourage maximum independence but intervene and supervise when necessary  - Involve family in performance of ADLs  - Assess for home care needs following discharge   - Consider OT consult to assist with ADL evaluation and planning for discharge  - Provide patient education as appropriate  Outcome: Progressing     Problem: SKIN/TISSUE INTEGRITY - ADULT  Goal: Skin Integrity remains intact(Skin Breakdown Prevention)  Description: Assess:  -Perform Gabe assessment every   -Clean and moisturize skin every   -Inspect skin when repositioning, toileting, and assisting with ADLS  -Assess under medical devices such as  every   -Assess extremities for adequate circulation and sensation     Bed Management:  -Have minimal linens on bed & keep smooth, unwrinkled  -Change linens as needed when moist or perspiring  -Avoid sitting or lying in one position for more than  hours while in bed  -Keep HOB at degrees     Toileting:  -Offer bedside commode  -Assess for incontinence every   -Use incontinent care products after each incontinent episode such as     Activity:  -Mobilize patient  times a day  -Encourage activity and walks on unit  -Encourage or provide ROM exercises   -Turn and reposition patient every  Hours  -Use appropriate equipment to lift or move patient in bed  -Instruct/ Assist with weight shifting every  when out of bed in chair  -Consider limitation of chair time hour intervals    Skin Care:  -Avoid use of baby powder, tape, friction and shearing, hot water or constrictive clothing  -Relieve pressure over bony prominences using   -Do not massage red bony areas    Next Steps:  -Teach patient strategies to minimize risks such as    -Consider consults to  interdisciplinary teams such as   Outcome: Progressing

## 2022-09-29 NOTE — PHYSICAL THERAPY NOTE
PHYSICAL THERAPY TREATMENT  NAME:  Adelfo Longest  DATE: 09/29/22    AGE:   76 y o   Mrn:   [id number]  ADMIT DX:  Diarrhea of presumed infectious origin [R19 7]  Fatigue [R53 83]  C  difficile diarrhea [A04 72]  Sepsis ([facility] ) [A41 9]    Past Medical History:   Diagnosis Date    Anemia     Anxiety     Bipolar disorder ([facility] )     Colon polyp     Disease of thyroid gland     Essential hypertension     Essential tremor     Fibromyalgia, primary     GERD (gastroesophageal reflux disease)     Inflammatory polyarthropathy (HCC)     Mammogram abnormal     Pressure injury of skin      Past Surgical History:   Procedure Laterality Date    BREAST BIOPSY Right 2015    benign    CARPAL TUNNEL RELEASE Bilateral     COLONOSCOPY      EGD AND COLONOSCOPY  01/01/2014    GASTRIC BYPASS  07/01/2012    MAMMO NEEDLE LOCALIZATION RIGHT ([employer] INC) Right 2/6/2012    MAMMO NEEDLE LOCALIZATION RIGHT (ALL INC) Right 2/6/2012    ULNAR NERVE TRANSPOSITION Right        Length Of Stay: 29 09/29/22 1046   PT Last Visit   PT Visit Date 09/29/22   Note Type   Note Type Treatment for insurance authorization   Pain Assessment   Pain Assessment Tool 0-10   Pain Score 10 - Worst Possible Pain   Pain Location/Orientation Location: Generalized   Restrictions/Precautions   Weight Bearing Precautions Per Order No   Other Precautions Agitated; Impulsive;Cognitive; Chair Alarm; Bed Alarm; Fall Risk;Pain;Multiple lines  (requried return to chair post session 2* pt agitated and attemptng to return self impulsively to bed )   General   Chart Reviewed Yes   Family/Caregiver Present No   Cognition   Overall Cognitive Status Impaired   Arousal/Participation Uncooperative;Arousable   Attention Difficulty dividing attention   Orientation Level Oriented to person;Disoriented to situation;Disoriented to time   Memory Decreased recall of recent events;Decreased recall of precautions;Decreased short term memory   Following Commands Follows one step commands without difficulty   Comments pt irritable throughout session- did not recall this therapist from yesterdays session  required frequent redirection to task  has very poor awareness of deficits   Bed Mobility   Supine to Sit 3  Moderate assistance   Additional items Assist x 1   Sit to Supine 3  Moderate assistance   Additional items Assist x 1; Impulsive;Verbal cues   Additional Comments pt tolerated OOB in recliner < 3 mins prior to scooting self forward and was attempting to throw self BTB  assisted pt w/ modA SPV xfer to L BTB and times spent repositioning and for presssure relief partially to L side (also on air mattress)   Transfers   Sit to Stand 3  Moderate assistance   Additional items Assist x 1; Increased time required; Impulsive;Verbal cues  (recommend 2nd for lines/ safety when pt agitated)   Stand to Sit 3  Moderate assistance   Additional items Assist x 1; Increased time required;Verbal cues   Stand pivot 3  Moderate assistance   Additional items Assist x 1;Assist x 2; Increased time required; Impulsive;Verbal cues   Ambulation/Elevation   Gait pattern R Knee Yordan;L Knee Yordan; Not appropriate  (suboptimal effort during session w/ increased confusion- not appropriate  high fall risk)   Balance   Static Sitting Fair -   Dynamic Sitting Poor +   Static Standing Poor   Dynamic Standing Poor -   Endurance Deficit   Endurance Deficit Yes   Endurance Deficit Description fatigue and weakness ; confusion + agitation   Activity Tolerance   Activity Tolerance Patient limited by fatigue;Patient limited by pain   Medical Staff Made Aware RN clears pt for session   Assessment   Prognosis Fair   Problem List Decreased strength; Impaired balance;Decreased mobility; Decreased coordination;Decreased cognition; Impaired judgement;Decreased safety awareness;Decreased skin integrity;Pain;Decreased endurance;Decreased range of motion   Assessment pt w/ increased confusion; agitation and resisting OOB + care this am  reports poor sleep and not wanting to stay OOB for breakfast  noted soiled linen and pt did xfer OOB to chair w/ assist- but was attempting to stand and impulsively self BTB  Pt adamently refusing any OOB this am and demanding to go BTB- deemed upsafe to be OOB in recliner even w/ chair alarm on; thereefore assisted BTB  Time for repositioning/ offloading of heels  Barriers to Discharge Inaccessible home environment;Decreased caregiver support   Goals   Patient Goals be left alone; sleep   PT Treatment Day 2   Plan   Treatment/Interventions Functional transfer training;LE strengthening/ROM; Therapeutic exercise; Endurance training;Cognitive reorientation;Patient/family training;Equipment eval/education;Gait training;Bed mobility;Spoke to nursing;OT   PT Frequency 2-3x/wk   Recommendation   PT Discharge Recommendation Return to facility with rehabilitation services   AM-PAC Basic Mobility Inpatient   Turning in Bed Without Bedrails 2   Lying on Back to Sitting on Edge of Flat Bed 2   Moving Bed to Chair 2   Standing Up From Chair 2   Walk in Room 1   Climb 3-5 Stairs 1   Basic Mobility Inpatient Raw Score 10   Turning Head Towards Sound 4   Follow Simple Instructions 2   Low Function Basic Mobility Raw Score 16   Low Function Basic Mobility Standardized Score 25 72   Highest Level Of Mobility   JH-HLM Goal 4: Move to chair/commode   JH-HLM Achieved 4: Move to chair/commode   Education   Education Provided Mobility training   Patient Reinforcement needed   End of Consult   Patient Position at End of Consult Supine;Bed/Chair alarm activated; All needs within reach       The patient's AM-PAC Basic Mobility Inpatient Short Form Raw Score is 10  A Raw score of less than or equal to 16 suggests the patient may benefit from discharge to post-acute rehabilitation services  Please also refer to the recommendation of the Physical Therapist for safe discharge planning            Sara Lopez

## 2022-09-29 NOTE — ASSESSMENT & PLAN NOTE
· Exacerbated by diarrhea/dehydration however patient is chronically malnourished     · S/p PT/OT evaluation  · CM following for safe discharge planning

## 2022-09-29 NOTE — ASSESSMENT & PLAN NOTE
Malnutrition Findings:   Adult Malnutrition type: Chronic illness  Adult Degree of Malnutrition: Other severe protein calorie malnutrition  Malnutrition Characteristics: Weight loss, Muscle loss       360 Statement: Severe/Chronic malnutrition r/t condition, as evidenced by 4 8% wt loss x < 1 week (9/2/22: 125#, 9/5/22: 119#), and severe muscle mass depletion (temples/clavicle)  Treated with liberalized diet and nutrition supplements, possibly nutrition support pending goals of care    BMI Findings: Body mass index is 18 28 kg/m²  · Hx of Savage-en Y gastric bypass, 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  Now off due to recurrent bacteremia  · S/p PEG placement and on PO feeds   Cont nutritional supplements

## 2022-09-29 NOTE — PLAN OF CARE
Problem: OCCUPATIONAL THERAPY ADULT  Goal: Performs self-care activities at highest level of function for planned discharge setting  See evaluation for individualized goals  Description: Treatment Interventions: ADL retraining, Functional transfer training, Endurance training, UE strengthening/ROM, Cognitive reorientation, Patient/family training, Equipment evaluation/education, Compensatory technique education, Continued evaluation, Energy conservation, Activityengagement          See flowsheet documentation for full assessment, interventions and recommendations  Note: Limitation: Decreased ADL status, Decreased endurance, Decreased self-care trans, Decreased high-level ADLs  Prognosis: Fair  Assessment: Patient participated in Skilled OT session this date with interventions consisting of ADL re training with the use of correct body mechnaics, safety awareness and fall prevention techniques,  therapeutic activities to: increase activity tolerance and increase dynamic sit/ stand balance during functional activity    Upon arrival patient was found supine in bed  Pt demonstrated the following tasks: S for grooming, MIN A for UBD  Pt defers any further ADLs at this time  Pt performed MOD A sup to sit, MIN A STS, and MOD A x 1 + SBA of another for safety for STS/SPT  Pt with agitation and decreased safety awareness t/o session  Requires cues to redirect  Patient continues to be functioning below baseline level, occupational performance remains limited secondary to factors listed above and increased risk for falls and injury  From OT standpoint, recommendation at time of d/c would be STR  Patient to benefit from continued Occupational Therapy treatment while in the hospital to address deficits as defined above and maximize level of functional independence with ADLs and functional mobility  Pt was left after session with all current needs met   The patient's raw score on the AM-PAC Daily Activity inpatient short form is 15, standardized score is 34 69, less than 39 4  Patients at this level are likely to benefit from discharge to post-acute rehabilitation services  Please refer to the recommendation of the Occupational Therapist for safe discharge planning       OT Discharge Recommendation: Post acute rehabilitation services

## 2022-09-29 NOTE — ASSESSMENT & PLAN NOTE
Patient with history of gastric bypass surgery  Has had significant malnutrition issues and anastomotic ulcerations  Seen by Bariatric Service during recent hospitalization at [facility] who recommended prolonged course of TPN for nutritional optimization and possible revision of surgery in the future  · Was planned to have appt with bariatric sx on 8/20 however had to be rescheduled  · Given recurrent bacteremia; s/p GI consult; recommended discontinuation of TPN  S/p PEG, TF initiated- nutrition adjusted rate of TF  · P O   Diet for pleasure feeding resumed  · To f/u with bariatric surgery

## 2022-09-29 NOTE — PLAN OF CARE
Problem: Potential for Falls  Goal: Patient will remain free of falls  Description: INTERVENTIONS:  - Educate patient/family on patient safety including physical limitations  - Instruct patient to call for assistance with activity   - Consult OT/PT to assist with strengthening/mobility   - Keep Call bell within reach  - Keep bed low and locked with side rails adjusted as appropriate  - Keep care items and personal belongings within reach  - Initiate and maintain comfort rounds  - Make Fall Risk Sign visible to staff  - Apply yellow socks and bracelet for high fall risk patients  - Consider moving patient to room near nurses station  Outcome: Progressing     Problem: MOBILITY - ADULT  Goal: Maintain or return to baseline ADL function  Description: INTERVENTIONS:  -  Assess patient's ability to carry out ADLs; assess patient's baseline for ADL function and identify physical deficits which impact ability to perform ADLs (bathing, care of mouth/teeth, toileting, grooming, dressing, etc )  - Assess/evaluate cause of self-care deficits   - Assess range of motion  - Assess patient's mobility; develop plan if impaired  - Assess patient's need for assistive devices and provide as appropriate  - Encourage maximum independence but intervene and supervise when necessary  - Involve family in performance of ADLs  - Assess for home care needs following discharge   - Consider OT consult to assist with ADL evaluation and planning for discharge  - Provide patient education as appropriate  Outcome: Progressing  Goal: Maintains/Returns to pre admission functional level  Description: INTERVENTIONS:  - Perform BMAT or MOVE assessment daily    - Set and communicate daily mobility goal to care team and patient/family/caregiver     - Collaborate with rehabilitation services on mobility goals if consulted  - Out of bed for toileting  - Record patient progress and toleration of activity level   Outcome: Progressing     Problem: INFECTION - ADULT  Goal: Absence or prevention of progression during hospitalization  Description: INTERVENTIONS:  - Assess and monitor for signs and symptoms of infection  - Monitor lab/diagnostic results  - Monitor all insertion sites, i e  indwelling lines, tubes, and drains  - Monitor endotracheal if appropriate and nasal secretions for changes in amount and color  - [place] appropriate cooling/warming therapies per order  - Administer medications as ordered  - Instruct and encourage patient and family to use good hand hygiene technique  - Identify and instruct in appropriate isolation precautions for identified infection/condition  Outcome: Progressing  Goal: Absence of fever/infection during neutropenic period  Description: INTERVENTIONS:  - Monitor WBC    Outcome: Progressing     Problem: SAFETY ADULT  Goal: Patient will remain free of falls  Description: INTERVENTIONS:  - Educate patient/family on patient safety including physical limitations  - Instruct patient to call for assistance with activity   - Consult OT/PT to assist with strengthening/mobility   - Keep Call bell within reach  - Keep bed low and locked with side rails adjusted as appropriate  - Keep care items and personal belongings within reach  - Initiate and maintain comfort rounds  - Make Fall Risk Sign visible to staff  - Apply yellow socks and bracelet for high fall risk patients  - Consider moving patient to room near nurses station  Outcome: Progressing  Goal: Maintain or return to baseline ADL function  Description: INTERVENTIONS:  -  Assess patient's ability to carry out ADLs; assess patient's baseline for ADL function and identify physical deficits which impact ability to perform ADLs (bathing, care of mouth/teeth, toileting, grooming, dressing, etc )  - Assess/evaluate cause of self-care deficits   - Assess range of motion  - Assess patient's mobility; develop plan if impaired  - Assess patient's need for assistive devices and provide as appropriate  - Encourage maximum independence but intervene and supervise when necessary  - Involve family in performance of ADLs  - Assess for home care needs following discharge   - Consider OT consult to assist with ADL evaluation and planning for discharge  - Provide patient education as appropriate  Outcome: Progressing  Goal: Maintains/Returns to pre admission functional level  Description: INTERVENTIONS:  - Perform BMAT or MOVE assessment daily    - Set and communicate daily mobility goal to care team and patient/family/caregiver  - Collaborate with rehabilitation services on mobility goals if consulted  - Out of bed for toileting  - Record patient progress and toleration of activity level   Outcome: Progressing     Problem: DISCHARGE PLANNING  Goal: Discharge to home or other facility with appropriate resources  Description: INTERVENTIONS:  - Identify barriers to discharge w/patient and caregiver  - Arrange for needed discharge resources and transportation as appropriate  - Identify discharge learning needs (meds, wound care, etc )  - Arrange for interpretive services to assist at discharge as needed  - Refer to Case Management Department for coordinating discharge planning if the patient needs post-hospital services based on physician/advanced practitioner order or complex needs related to functional status, cognitive ability, or social support system  Outcome: Progressing     Problem: Knowledge Deficit  Goal: Patient/family/caregiver demonstrates understanding of disease process, treatment plan, medications, and discharge instructions  Description: Complete learning assessment and assess knowledge base    Interventions:  - Provide teaching at level of understanding  - Provide teaching via preferred learning methods  Outcome: Progressing     Problem: SKIN/TISSUE INTEGRITY - ADULT  Goal: Skin Integrity remains intact(Skin Breakdown Prevention)  Description: Assess:  -Assess extremities for adequate circulation and sensation     Bed Management:  -Have minimal linens on bed & keep smooth, unwrinkled  -Change linens as needed when moist or perspiring    Skin Care:  -Avoid use of baby powder, tape, friction and shearing, hot water or constrictive clothing  -Do not massage red bony areas    Outcome: Progressing  Goal: Incision(s), wounds(s) or drain site(s) healing without S/S of infection  Description: INTERVENTIONS  - Assess and document dressing, incision, wound bed, drain sites and surrounding tissue  - Provide patient and family education    Outcome: Progressing  Goal: Pressure injury heals and does not worsen  Description: Interventions:  - Implement low air loss mattress or specialty surface (Criteria met)  - Apply silicone foam dressing  - Apply fecal or urinary incontinence containment device   - Utilize friction reducing device or surface for transfers     - Consider nutrition services referral as needed  Outcome: Progressing     Problem: Prexisting or High Potential for Compromised Skin Integrity  Goal: Skin integrity is maintained or improved  Description: INTERVENTIONS:  - Identify patients at risk for skin breakdown  - Assess and monitor skin integrity  - Assess and monitor nutrition and hydration status  - Monitor labs   - Assess for incontinence   - Turn and reposition patient  - Assist with mobility/ambulation  - Relieve pressure over bony prominences  - Avoid friction and shearing  - Provide appropriate hygiene as needed including keeping skin clean and dry  - Evaluate need for skin moisturizer/barrier cream  - Collaborate with interdisciplinary team   - Patient/family teaching  - Consider wound care consult   Outcome: Progressing     Problem: Nutrition/Hydration-ADULT  Goal: Nutrient/Hydration intake appropriate for improving, restoring or maintaining nutritional needs  Description: Monitor and assess patient's nutrition/hydration status for malnutrition   Collaborate with interdisciplinary team and initiate plan and interventions as ordered  Monitor patient's weight and dietary intake as ordered or per policy  Utilize nutrition screening tool and intervene as necessary  Determine patient's food preferences and provide high-protein, high-caloric foods as appropriate       INTERVENTIONS:  - Monitor oral intake, urinary output, labs, and treatment plans  - Assess nutrition and hydration status and recommend course of action  - Evaluate amount of meals eaten  - Assist patient with eating if necessary   - Allow adequate time for meals  - Recommend/ encourage appropriate diets, oral nutritional supplements, and vitamin/mineral supplements  - Order, calculate, and assess calorie counts as needed  - Recommend, monitor, and adjust tube feedings and TPN/PPN based on assessed needs  - Assess need for intravenous fluids  - Provide specific nutrition/hydration education as appropriate  - Include patient/family/caregiver in decisions related to nutrition  Outcome: Progressing

## 2022-09-29 NOTE — OCCUPATIONAL THERAPY NOTE
Occupational Therapy Progress Note     Patient Name: Lenwood Fabry  PKFIG'O Date: 9/29/2022  Problem List  Principal Problem:    Sepsis [facility])  Active Problems:    Hyponatremia    Abnormal CT scan    H/O bariatric surgery - bypass    NOAH (acute kidney injury) ([facility] )    Ambulatory dysfunction    Diarrhea    Moderate protein-calorie malnutrition ([facility] )    Bacteremia    Acute on chronic anemia    Goals of care, counseling/discussion    Acute encephalopathy    Transaminitis    Hypotension    Urinary retention          09/29/22 1023   OT Last Visit   OT Visit Date 09/29/22   Note Type   Note Type Treatment for insurance authorization   Restrictions/Precautions   Weight Bearing Precautions Per Order No   Other Precautions Agitated;Cognitive; Chair Alarm; Bed Alarm;Multiple lines; Fall Risk   Lifestyle   Autonomy At baseline, pt is I with ADLS, IADLS and mobility without device  Reciprocal Relationships Pt lives alone but has recently been receiving assistance from facility staff   Service to Others Retired    Intrinsic Gratification Gardening   Pain Assessment   Pain Assessment Tool 0-10   Pain Score No Pain   ADL   Where Assessed Chair   Grooming Assistance 5  Supervision/Setup   Grooming Deficit Wash/dry face   UB Dressing Assistance 4  Minimal Assistance   UB Dressing Deficit Thread LUE; Thread RUE   Bed Mobility   Supine to Sit 3  Moderate assistance   Additional items Assist x 1; Increased time required;HOB elevated; Bedrails;LE management   Sit to Supine 4  Minimal assistance   Additional items Assist x 1;HOB elevated; Bedrails; Increased time required;LE management   Transfers   Sit to Stand 3  Moderate assistance   Additional items Assist x 1; Increased time required;Verbal cues   Stand to Sit 3  Moderate assistance   Additional items Assist x 1; Increased time required;Verbal cues   Stand pivot 3  Moderate assistance   Additional items Assist x 2; Increased time required;Verbal cues  (MOD A x 1 + MIN A of another for safety)   Cognition   Overall Cognitive Status Impaired   Arousal/Participation Arousable   Attention Difficulty attending to directions   Orientation Level Oriented to person;Oriented to place   Memory Decreased recall of recent events   Following Commands Follows one step commands inconsistently   Comments Pt irritable during session  Requires frequent encouragement  Decreased safety awareness   Activity Tolerance   Activity Tolerance Treatment limited secondary to agitation   Medical Staff Made Aware PT [place]   Assessment   Assessment Patient participated in Skilled OT session this date with interventions consisting of ADL re training with the use of correct body mechnaics, safety awareness and fall prevention techniques,  therapeutic activities to: increase activity tolerance and increase dynamic sit/ stand balance during functional activity    Upon arrival patient was found supine in bed  Pt demonstrated the following tasks: S for grooming, MIN A for UBD  Pt defers any further ADLs at this time  Pt performed MOD A sup to sit, MIN A STS, and MOD A x 1 + SBA of another for safety for STS/SPT  Pt with agitation and decreased safety awareness t/o session  Requires cues to redirect  Patient continues to be functioning below baseline level, occupational performance remains limited secondary to factors listed above and increased risk for falls and injury  From OT standpoint, recommendation at time of d/c would be STR  Patient to benefit from continued Occupational Therapy treatment while in the hospital to address deficits as defined above and maximize level of functional independence with ADLs and functional mobility  Pt was left after session with all current needs met  The patient's raw score on the AM-PAC Daily Activity inpatient short form is 15, standardized score is 34 69, less than 39 4  Patients at this level are likely to benefit from discharge to post-acute rehabilitation services   Please refer to the recommendation of the Occupational Therapist for safe discharge planning  Plan   Treatment Interventions ADL retraining;Functional transfer training;UE strengthening/ROM; Endurance training;Cognitive reorientation;Patient/family training;Equipment evaluation/education; Compensatory technique education;Continued evaluation; Energy conservation; Activityengagement   Goal Expiration Date 10/17/22   OT Treatment Day 5   OT Frequency 2-3x/wk   Recommendation   OT Discharge Recommendation Post acute rehabilitation services   AM-PAC Daily Activity Inpatient   Lower Body Dressing 2   Bathing 2   Toileting 2   Upper Body Dressing 3   Grooming 3   Eating 3   Daily Activity Raw Score 15   Daily Activity Standardized Score (Calc for Raw Score >=11) 34 69   AM-PAC Applied Cognition Inpatient   Following a Speech/Presentation 2   Understanding Ordinary Conversation 3   Taking Medications 2   Remembering Where Things Are Placed or Put Away 1   Remembering List of 4-5 Errands 1   Taking Care of Complicated Tasks 1   Applied Cognition Raw Score 10   Applied Cognition Standardized Score 24 98     Francis Infante MS, OTR/L

## 2022-09-29 NOTE — CONSULTS
Consultation - Nephrology   Tacoh Cuello 76 y o  female MRN: [id number]  Unit/Bed#: [facility] 317-01 Encounter: [id number]    ASSESSMENT and PLAN:  Acute kidney injury/elevated creatinine:  The setting of suspected Chronic Kidney Disease with decreased muscle mass  Etiology: Suspect prerenal in the setting of GI loss with diarrhea, recurrent sepsis, anemia, and relative hypotension along with IV contrast exposure 08/31/2022 and 09/26/2022  Assessment:  • After review medical records through [facility] and [facility] everywhere baseline creatinine 0 8-1 2 dating back to 11/2020  • Admission creatinine 1 35 on 08/31/2022-initially resolved with IV fluids quickly-suggestive of prerenal  • Creatinine started to rise on 09/06/2022 with fluctuations between 1 3-1 6 which appears stable  • Current creatinine 1 55 on 09/29/2022  Workup:  • Urinalysis with micro 9/23/2022 reports:  Trace blood, positive nitrates, trace leukocytes, plus one protein, 10-20 WBCs, occasional calcium oxalate  •  Renal imaging CT scan on 09/26/2022 with contrast revealed:  No hydronephrosis and kidneys were unremarkable  Plan:  • Renal function remains stable without further elevation in creatinine  • Agree with midodrine to keep blood pressure greater 100  • Avoid hypotension  • Will repeat urinalysis with micro  • Will check urine eos to rule out AIN in the setting of antibiotics  • Adjust meds to appropriate GFR  • Optimize hemodynamic status to avoid delay in renal recovery  • Likely new baseline  • Check BMP in a m    • With stability of renal function doubt need daily blood work    Hypotension  Assessment and Plan:  • Current blood pressure 01'D systolic  • Current medications:  Midodrine 10 mg t i d   • Maximize hemodynamics to maintain MAP >65  • Avoid hypotension or fluctuations in blood pressure  • Will continue to trend  • Consider compression stockings    Hyponatremia  Assessment and Plan:  • Suspect component of malabsorption in the setting of prior gastric bypass, ongoing diarrhea, poor solute intake, possible SIADH with ongoing pain causing increased in ADH  • Currently on sodium salt tablets 1 gram BID per primary team  • Will check urine osmolality and urine sodium  • On tube feedings nightly from 6:00 p m  to 10:00 a m  with 50 cc water flushes every 6 hours  • Check BMP in a m  Acute on chronic Anemia:   Assessment and Plan:  • Current hemoglobin 7 5  • Status post prior transfusion  • Previously evaluated by GI  • 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  • Statu post EGD in July which revealed Residual marginal ulcer of the gastrojejunostomy anastomosis with improved size  • Management per primary team:    Discussed above plan with primary team    HISTORY OF PRESENT ILLNESS:  Requesting Physician: Robb Muller MD  Reason for Consult:  Acute kidney injury, hyponatremia    Bora Ramsay is a 76 y o  female with past medical history of hypertension, GERD, bipolar disorder and depression s/p Savage-en-Y gastric bypass complicated by ulceration requiring PICC line and TPN who initially presented on 08/30/2022 with diarrhea associated with increased lethargy and weakness  Of note, the patient had a recent admission with septic shock secondary to PICC line related MSSA bacteremia treated with IV cefazolin  Current hospitalization complicated by recurrent sepsis now completed treatment today with Ertapenem for ESBL UTI, diarrhea, acute on chronic anemia status post intermittent transfusions, acute encephalopathy, hypotension, hyponatremia, protein calorie malnutrition,  NOAH and hyponatremia  A renal consultation is requested today for assistance in the management of acute kidney injury and hyponatremia  Hospital records reviewed  After review medical records through [facility] everywhere baseline creatinine 0 8-1 2 dating back to November of 2020  Current creatinine stable between 1 3 and 1 6 since 09/06/2022    Patient with lower sodium levels 130-134 with last hospitalization in   On discussion, the patient reports does not have much of an appetite does not eat well overall       PAST MEDICAL HISTORY:  Past Medical History:   Diagnosis Date   • Anemia    • Anxiety    • Bipolar disorder ([address] )    • Colon polyp    • Disease of thyroid gland    • Essential hypertension    • Essential tremor    • Fibromyalgia, primary    • GERD (gastroesophageal reflux disease)    • Inflammatory polyarthropathy (HCC)    • Mammogram abnormal    • Pressure injury of skin        PAST SURGICAL HISTORY:  Past Surgical History:   Procedure Laterality Date   • BREAST BIOPSY Right     benign   • CARPAL TUNNEL RELEASE Bilateral    • COLONOSCOPY     • EGD AND COLONOSCOPY  2014   • GASTRIC BYPASS  2012   • MAMMO NEEDLE LOCALIZATION RIGHT (ALL INC) Right 2012   • MAMMO NEEDLE LOCALIZATION RIGHT (ALL INC) Right 2012   • ULNAR NERVE TRANSPOSITION Right        ALLERGIES:  No Known Allergies    SOCIAL HISTORY:  Social History     Substance and Sexual Activity   Alcohol Use Yes   • Alcohol/week: 4 0 standard drinks   • Types: 4 Glasses of wine per week    Comment: rare     Social History     Substance and Sexual Activity   Drug Use Never     Social History     Tobacco Use   Smoking Status Former Smoker   • Quit date:    • Years since quittin 7   Smokeless Tobacco Never Used       FAMILY HISTORY:  Family History   Problem Relation Age of Onset   • No Known Problems Mother    • No Known Problems Father    • No Known Problems Sister    • No Known Problems Maternal Grandmother    • No Known Problems Maternal Grandfather    • No Known Problems Paternal Grandmother    • No Known Problems Paternal Grandfather    • No Known Problems Sister    • No Known Problems Sister    • Stomach cancer Maternal Aunt    • No Known Problems Maternal Aunt    • No Known Problems Maternal Aunt    • No Known Problems Maternal Aunt    • No Known Problems Paternal Aunt    • Leukemia Other        MEDICATIONS:    Current Facility-Administered Medications:   •  acetaminophen (TYLENOL) tablet 975 mg, 975 mg, Oral, Q6H PRN, Ale Chew PA-C, 975 mg at 09/26/22 2315  •  ALPRAZolam Keenan Alba) tablet 0 25 mg, 0 25 mg, Oral, TID PRN, Consuelo Geller, DO, 0 25 mg at 09/24/22 1616  •  alteplase (CATHFLO) injection 2 mg, 2 mg, Intracatheter, Once, Larry Sevilla PA-C  •  bisacodyl (DULCOLAX) rectal suppository 10 mg, 10 mg, Rectal, Daily PRN, Ana Espino MD  •  bisacodyl (DULCOLAX) rectal suppository 10 mg, 10 mg, Rectal, Once, Mandy Nichole MD  •  clonazePAM (KlonoPIN) tablet 3 mg, 3 mg, Oral, HS, Jovita Mcdermott, DO, 3 mg at 53/68/65 3424  •  folic acid (FOLVITE) tablet 1 mg, 1 mg, Oral, Daily, Jovita Mcdermott, DO, 1 mg at 09/29/22 0859  •  guaiFENesin (MUCINEX) 12 hr tablet 600 mg, 600 mg, Oral, Q12H [facility], Aurelia Encinas PA-C, 600 mg at 09/29/22 0766  •  heparin (porcine) subcutaneous injection 5,000 Units, 5,000 Units, Subcutaneous, Q8H [facility], Consuelo Geller DO, 5,000 Units at 09/27/22 1403  •  levothyroxine tablet 112 mcg, 112 mcg, Oral, Early Morning, Jovita Mcdermott, DO, 112 mcg at 09/27/22 0725  •  magnesium hydroxide (MILK OF MAGNESIA) oral suspension 30 mL, 30 mL, Oral, Daily PRN, Ana Espino MD, 30 mL at 09/15/22 1421  •  metoclopramide (REGLAN) injection 10 mg, 10 mg, Intravenous, Q6H PRN, Ana Espino MD  •  midodrine (PROAMATINE) tablet 10 mg, 10 mg, Oral, TID ACJud CRNP, 10 mg at 09/29/22 1219  •  ondansetron (ZOFRAN) injection 4 mg, 4 mg, Intravenous, Q6H PRN, Jovita Mcdermott DO, 4 mg at 09/18/22 1339  •  oxyCODONE (ROXICODONE) IR tablet 5 mg, 5 mg, Oral, Q6H, Ana Espino MD, 5 mg at 09/29/22 1219  •  pantoprazole (PROTONIX) EC tablet 40 mg, 40 mg, Oral, BID AC, Kaylan Arroyo DO, 40 mg at 09/29/22 0810  •  polyethylene glycol (MIRALAX) packet 17 g, 17 g, Per PEG Tube, BID, Jovita Mcdermott DO, 17 g at 09/24/22 3388  • senna (SENOKOT) tablet 17 2 mg, 2 tablet, Oral, HS, Loida Tenorio MD, 17 2 mg at 09/28/22 2115  •  sodium chloride tablet 1 g, 1 g, Oral, BID With Meals, TOLU Orellana, 1 g at 09/28/22 1701  •  sucralfate (CARAFATE) tablet 1 g, 1 g, Oral, 4x Daily (AC & HS), Karol Lara DO, 1 g at 09/29/22 1219  •  thiamine tablet 100 mg, 100 mg, Oral, Daily, Jovitakash Valdesel, DO, 100 mg at 09/29/22 5878  •  traZODone (DESYREL) tablet 150 mg, 150 mg, Oral, HS, Jovita Mcdermott, DO, 150 mg at 09/28/22 2115  •  vancomycin (VANCOCIN) oral solution 125 mg, 125 mg, Oral, Q12H [address], Hany Abarca MD, 125 mg at 09/29/22 0859    REVIEW OF SYSTEMS:  Patient seen and examined at bedside  Review of Systems   Constitutional: Positive for appetite change and fatigue  I do not eat very much, drink a lot a water   HENT: Negative  Eyes: Negative  Respiratory: Negative  Cardiovascular: Negative  Gastrointestinal: Positive for abdominal pain and diarrhea  Pain at PEG tube site which is constant   Genitourinary: Negative  Musculoskeletal: Negative  Skin: Negative  Allergic/Immunologic: Negative  Neurological: Positive for weakness  Hematological: Negative  Psychiatric/Behavioral: Negative  PHYSICAL EXAM:  Current Weight: Weight - Scale: 48 3 kg (106 lb 7 7 oz)  First Weight: Weight - Scale: 61 2 kg (135 lb)  Vitals:    09/27/22 1548 09/27/22 2230 09/28/22 0903 09/28/22 1546   BP: 117/54  (!) 96/49 99/50   BP Location: Right arm  Right arm Right arm   Pulse: (!) 116 96 83 82   Resp: 15 16 16 16   Temp: 98 2 °F (36 8 °C)  98 1 °F (36 7 °C) 97 6 °F (36 4 °C)   TempSrc: Oral  Oral Oral   SpO2: 94% 92% 98% 91%   Weight:       Height:           Intake/Output Summary (Last 24 hours) at 9/29/2022 1540  Last data filed at 9/29/2022 6941  Gross per 24 hour   Intake --   Output 900 ml   Net -900 ml       Physical Exam  Vitals and nursing note reviewed     Constitutional:       Comments: No acute distress, cachectic, chronically ill-appearing   HENT:      Head: Normocephalic and atraumatic  Nose: Nose normal       Mouth/Throat:      Mouth: Mucous membranes are dry  Pharynx: Oropharynx is clear  Eyes:      Extraocular Movements: Extraocular movements intact  Conjunctiva/sclera: Conjunctivae normal    Cardiovascular:      Rate and Rhythm: Normal rate and regular rhythm  Pulses: Normal pulses  Heart sounds: Normal heart sounds  Pulmonary:      Effort: Pulmonary effort is normal       Breath sounds: Normal breath sounds  Abdominal:      General: Bowel sounds are normal       Palpations: Abdomen is soft  Comments: Peg tube site intact  Tender when touched   Genitourinary:     Comments: purwick in place for clear loretta urine  Musculoskeletal:         General: Normal range of motion  Cervical back: Normal range of motion  Skin:     General: Skin is warm and dry  Neurological:      General: No focal deficit present  Mental Status: She is alert and oriented to person, place, and time  Psychiatric:         Mood and Affect: Mood normal          Behavior: Behavior normal          Thought Content:  Thought content normal          Judgment: Judgment normal                Lab Results:   Results from last 7 days   Lab Units 09/29/22  1139 09/28/22  1512 09/28/22  1043 09/27/22  0909 09/26/22  1025   WBC Thousand/uL 13 08* 13 42*  --  11 31* 14 50*   HEMOGLOBIN g/dL 7 5* 7 8*  --  8 0* 8 0*   HEMATOCRIT % 23 8* 25 1*  --  25 6* 26 2*   PLATELETS Thousands/uL 516* 492*  --  419* 483*   SODIUM mmol/L 133* 132* 131* 134* 129*   POTASSIUM mmol/L 4 5 4 7 5 7* 4 9 5 2   CHLORIDE mmol/L 103 102 102 100 101   CO2 mmol/L 23 24 24 25 24   BUN mg/dL 18 23 23 25 24   CREATININE mg/dL 1 55* 1 57* 1 49* 1 36* 1 30   CALCIUM mg/dL 8 2* 8 1* 8 8 8 4 7 7*   ALK PHOS U/L  --  144* 156*  --  206*   ALT U/L  --  42 47  --  83*   AST U/L  --  30 52*  --  129*

## 2022-09-29 NOTE — ASSESSMENT & PLAN NOTE
· Recent hx of staph epi bacteremia in 7/2022, presumed due to picc line  · Recurrence 1/2 set staph epi, 2nd set without growth  Was treated with intravenous vancomycin for presumed line infection  · S/p TAVIA (9/9) no evidence of vegetation  · Blood cultures had been negative however repeated again due to isolated fever which are negative to date  · PICC and TPN discontinued, now has PEG tube

## 2022-09-29 NOTE — ASSESSMENT & PLAN NOTE
· Recurrent sepsis  Presented with fever, tachypnea, tachycardia leukocytosis  Patient presented with PICC line which was in place for TPN, patient was treated for presumed line infection  Repeat blood cultures negative  Was also treated for possible C diff infection and remains on prophylactic vancomycin  Doppler with right forearm superficial thrombophlebitis  Had isolated fever 9/23 therefore ID was reconsulted, patient was having reports of dysuria  · Id following, completed intravenous ertapenem for ESBL UTI 9/29 for a total of 3 doses  · Repeat CT chest abdomen pelvis 9/27-for acute infectious etiology, incidental findings as below  · Repeat blood cultures no growth to date  · COVID-19 PCR negative  · Trend temps and leukocytosis  Has been afebrile since 09/26

## 2022-09-29 NOTE — ASSESSMENT & PLAN NOTE
· Baseline creatinine appears to be 0 9-1 1  · Creatinine was 1 1 on last discharge  · Creatinine has been 1 3s-1 5s  · Nephrology consulted

## 2022-09-29 NOTE — PROGRESS NOTES
Progress Note - Infectious Disease   Lenwood Fabry 76 y o  female MRN: [id number]  Unit/Bed#: [facility] 317-01 Encounter: [id number]      Impression/Plan:  1  Sepsis, recurrent  Isolated fever along with elevation in white count 9/23   Noted to have eosinophils on differential   Likely source is 2   Urine cultures polymicrobial, with degree of resistance noted  No other appreciable source   Blood cultures negative   Recent RUQ U/S and CT with cholelithiasis but no clinical evidence of cholecystitis   White count is down trending, labs pending today   Fevers  improved   C diff PCR testing negative   Upper extremity Doppler with right forearm superficial thrombophlebitis   CT imaging of the abdomen with contrast with some incidental findings but otherwise no overt pathology   COVID testing negative   Repeat blood cultures with recurrent fever NGTD  Currently tolerating antibiotic  Complete course of ertapenem today  Continue oral vancomycin prophylaxis through 10/2, last doses ordered  Follow-up pending blood cultures while admitted  Continue to trend fever curve/vitals  Repeat CBC/chemistry tomorrow  Monitor for recurrence of urinary symptoms  Additional supportive care as per primary  Will need PCP follow-up for additional findings on CT     2  Urinary tract infection   UA with some pyuria and patient reporting dysuria   Urine cultures noted to be polymicrobial with some degree of resistance   Suspect partial treatment leading to declining white count but continued fever  Fevers improved  Antibiotics as above  Monitor for ongoing symptoms  Continue to trend fever curve/WBC  Follow-up pending blood cultures     3  Abnormal abdominal imaging   Patient noted to have some abnormal changes to the gallbladder on imaging   Recent ultrasound unremarkable   Alkaline phosphatase borderline   Patient only localizes discomfort near the PEG tube, adjusted by GI   Repeat CT unremarkable    Monitor abdominal exam  Antibiotics as above     4  Recent C diff   Repeat PCR testing negative  Continue C diff prophylaxis while on IV antibiotics as above     5  Progressing anemia and recent GI bleed   Hemoglobin downtrended this weekend   Reportedly had some increased stool output   Continue to monitor hemoglobin and stool output   GI evaluations noted      6  Elevated serum creatinine   Unclear etiology  Nephrology evaluation appreciated  Fluid hydration as per primary  Continue enteral feeding  Consider review of medications  Repeat chemistry tomorrow     Above plan discussed with the patient and with primary service      ID consult service will continue to follow      Antibiotics:  Ertapenem 3  Total antibiotic 7  PO vancomycin 7    Subjective:  Patient seen at bedside and she denied having any nausea vomiting, chest pain or shortness of breath  Denied having any further dysuria  Tolerated antibiotics without issue  Overall seems more responsive and interactive today  Objective:  Vitals:     No data recorded  Current: Temperature: 97 6 °F (36 4 °C)    Physical Exam:   General Appearance:  Alert, interactive, nontoxic, no acute distress  Chronically ill-appearing and frail  Throat: Oropharynx moist without lesions  Lungs:   Clear to auscultation bilaterally; no wheezes, rhonchi or rales; respirations unlabored on room air   Heart:  RRR; no murmur, rub or gallop appreciated   Abdomen:   Soft, non-tender, non-distended, positive bowel sounds  Extremities: No clubbing, cyanosis or edema   Skin: No new rashes or lesions  No new draining wounds noted         Labs, Imaging, & Other studies:   All pertinent labs and imaging studies were personally reviewed  Results from last 7 days   Lab Units 09/29/22  1139 09/28/22  1512 09/27/22  0909   WBC Thousand/uL 13 08* 13 42* 11 31*   HEMOGLOBIN g/dL 7 5* 7 8* 8 0*   PLATELETS Thousands/uL 516* 492* 419*     Results from last 7 days   Lab Units 09/29/22  1139 09/28/22  1512 09/28/22  1043 09/27/22  0909 09/26/22  1025   POTASSIUM mmol/L 4 5 4 7 5 7*   < > 5 2   CHLORIDE mmol/L 103 102 102   < > 101   CO2 mmol/L 23 24 24   < > 24   BUN mg/dL 18 23 23   < > 24   CREATININE mg/dL 1 55* 1 57* 1 49*   < > 1 30   EGFR ml/min/1 73sq m 31 31 33   < > 39   CALCIUM mg/dL 8 2* 8 1* 8 8   < > 7 7*   AST U/L  --  30 52*  --  129*   ALT U/L  --  42 47  --  83*   ALK PHOS U/L  --  144* 156*  --  206*    < > = values in this interval not displayed  Results from last 7 days   Lab Units 09/26/22  1221 09/25/22  1642 09/23/22  1601 09/23/22  1558   BLOOD CULTURE  No Growth at 72 hrs  No Growth at 72 hrs   --   --  No Growth After 5 Days  No Growth After 5 Days     URINE CULTURE   --   --  >100,000 cfu/ml Proteus vulgaris*  >100,000 cfu/ml Citrobacter freundii*  >100,000 cfu/ml Klebsiella oxytoca ESBL*  >100,000 cfu/ml Morganella morganii*  --    C DIFF TOXIN B BY PCR   --  Negative  --   --

## 2022-09-29 NOTE — PROGRESS NOTES
[facility]  Progress Note - Agapito Pennington 1945, 76 y o  female MRN: [id number]  Unit/Bed#: [facility] 317-01 Encounter: [id number]  Primary Care Provider: Lise Ray MD   Date and time admitted to hospital: 8/30/2022 11:49 PM    * Sepsis [facility])  Assessment & Plan  · Recurrent sepsis  Presented with fever, tachypnea, tachycardia leukocytosis  Patient presented with PICC line which was in place for TPN, patient was treated for presumed line infection  Repeat blood cultures negative  Was also treated for possible C diff infection and remains on prophylactic vancomycin  Doppler with right forearm superficial thrombophlebitis  Had isolated fever 9/23 therefore ID was reconsulted, patient was having reports of dysuria  · Id following, completed intravenous ertapenem for ESBL UTI 9/29 for a total of 3 doses  · Repeat CT chest abdomen pelvis 9/27-for acute infectious etiology, incidental findings as below  · Repeat blood cultures no growth to date  · COVID-19 PCR negative  · Trend temps and leukocytosis  Has been afebrile since 09/26  Bacteremia  Assessment & Plan  · Recent hx of staph epi bacteremia in 7/2022, presumed due to picc line  · Recurrence 1/2 set staph epi, 2nd set without growth  Was treated with intravenous vancomycin for presumed line infection  · S/p TAVIA (9/9) no evidence of vegetation  · Blood cultures had been negative however repeated again due to isolated fever which are negative to date  · PICC and TPN discontinued, now has PEG tube  Diarrhea  Assessment & Plan  · Recent PCR test negative  Had recent C diff infection    Continue C diff prophylaxis  for 72 hours after completion of IV abx    NOAH (acute kidney injury) ([facility] [address] )  Assessment & Plan  · Baseline creatinine appears to be 0 9-1 1  · Creatinine was 1 1 on last discharge  · Creatinine has been 1 3s-1 5s  · Nephrology consulted     Hyponatremia  Assessment & Plan  · Was started on Na Cl tablets this admission and has persistent hyponatremia  · Nephrology consulted       Acute encephalopathy  Assessment & Plan  · Patient waxes and wanes  · ? From prolonged hospital stay with  induced delirium vs metabolic encephalopathy  · No focal deficit on exam  · Delirium precautions      Urinary retention  Assessment & Plan  · Has required intermittent catheterizations however now voiding spontaneously  · Continue retention protocol    Abnormal CT scan  Assessment & Plan  Noted on CT C/A/P 9/26  · Focal thickening of the lower portion of the endometrium  While this is not expected in a patient of this age, this appears stable from 2016 suggesting a benign etiology  If there is clinical concern, follow-up ultrasound is recommended  · Small left upper lobe nodules, one of which is new from March 2022  Given the history of smoking, a follow-up chest CT is recommended in 12 months  · This was d/w sister and brother POA over phone by prior provider 9/28    Acute on chronic anemia  Assessment & Plan  · Evaluated by GI prior  · 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  · S/p recent EGD in 7/22 revealing: Residual marginal ulcer of the gastrojejunostomy anastomosis with improved size  · S/p EGD 9/2 revealing: Large, cratered ulcer in the gastrojejunal anastomosis   · Received PRBC transfusion x 1 this admission,  continue monitoring transfuse less than 7  · Cont PPI, BID, carafate    Ambulatory dysfunction  Assessment & Plan  · Exacerbated by diarrhea/dehydration however patient is chronically malnourished  · S/p PT/OT evaluation  · CM following for safe discharge planning       Goals of care, counseling/discussion  Assessment & Plan  · Previously evaluated by palliative care  Wants to continue medical directed treatments with limits of DNR/DNI  · Continue current pain management regimen with scheduled oxycodone and additional p r n  Doses    Should follow-up with palliative Care as outpatient  Hypotension  Assessment & Plan  · Continue midodrine  · With NOAH  · Nephrology consulted     H/O bariatric surgery - bypass  Assessment & Plan  Patient with history of gastric bypass surgery  Has had significant malnutrition issues and anastomotic ulcerations  Seen by Bariatric Service during recent hospitalization at [facility] who recommended prolonged course of TPN for nutritional optimization and possible revision of surgery in the future  · Was planned to have appt with bariatric sx on 8/20 however had to be rescheduled  · Given recurrent bacteremia; s/p GI consult; recommended discontinuation of TPN  S/p PEG, TF initiated- nutrition adjusted rate of TF  · P O  Diet for pleasure feeding resumed  · To f/u with bariatric surgery      Moderate protein-calorie malnutrition ([facility] [address] )  Assessment & Plan  Malnutrition Findings:   Adult Malnutrition type: Chronic illness  Adult Degree of Malnutrition: Other severe protein calorie malnutrition  Malnutrition Characteristics: Weight loss, Muscle loss       360 Statement: Severe/Chronic malnutrition r/t condition, as evidenced by 4 8% wt loss x < 1 week (9/2/22: 125#, 9/5/22: 119#), and severe muscle mass depletion (temples/clavicle)  Treated with liberalized diet and nutrition supplements, possibly nutrition support pending goals of care    BMI Findings: Body mass index is 18 28 kg/m²  · Hx of Savage-en Y gastric bypass, 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  Now off due to recurrent bacteremia  · S/p PEG placement and on PO feeds  Cont nutritional supplements    Transaminitis  Assessment & Plan  · NO RUQ pain, USG with gallstones, no choelcystitis  · Will trend        VTE Pharmacologic Prophylaxis: VTE Score: 3 Moderate Risk (Score 3-4) - Pharmacological DVT Prophylaxis Ordered: heparin  Patient Centered Rounds: I performed bedside rounds with nursing staff today   d/w RN  Discussions with Specialists or Other Care Team Provider: ID, Nephrology, case manager     Education and Discussions with Family / Patient: Updated  (brother) via phone  Davon     Time Spent for Care: 45 minutes  More than 50% of total time spent on counseling and coordination of care as described above  Current Length of Stay: 29 day(s)  Current Patient Status: Inpatient   Certification Statement: The patient will continue to require additional inpatient hospital stay due to NOAH, hyponatremia, safe discharge planning   Discharge Plan: Anticipate discharge in 24-48 hrs to rehab facility  Code Status: Level 3 - DNAR and DNI    Subjective:   Ms Abdoulaye Johnson reports some discomfort at PEG tube site  Objective:     Vitals:   Temp (24hrs), Av 6 °F (36 4 °C), Min:97 6 °F (36 4 °C), Max:97 6 °F (36 4 °C)    Temp:  [97 6 °F (36 4 °C)] 97 6 °F (36 4 °C)  HR:  [82] 82  Resp:  [16] 16  BP: (99)/(50) 99/50  SpO2:  [91 %] 91 %  Body mass index is 18 28 kg/m²  Input and Output Summary (last 24 hours): Intake/Output Summary (Last 24 hours) at 2022 1500  Last data filed at 2022 0838  Gross per 24 hour   Intake --   Output 900 ml   Net -900 ml       Physical Exam:   Physical Exam  Vitals reviewed  Constitutional:       Comments: Patient seen lying in bed, chronically ill appearing   Cardiovascular:      Rate and Rhythm: Normal rate and regular rhythm  Pulmonary:      Effort: Pulmonary effort is normal  No respiratory distress  Breath sounds: Normal breath sounds  Abdominal:      General: Bowel sounds are normal       Palpations: Abdomen is soft  Tenderness: There is no abdominal tenderness  Musculoskeletal:      Right lower leg: No edema  Left lower leg: No edema  Skin:     General: Skin is warm and dry  Neurological:      Mental Status: She is alert        Comments: Oriented to self, hospital in [place], and year is "22"   Psychiatric:         Mood and Affect: Mood normal           Additional Data:     Labs:  Results from last 7 days   Lab Units 09/29/22  1139   WBC Thousand/uL 13 08*   HEMOGLOBIN g/dL 7 5*   HEMATOCRIT % 23 8*   PLATELETS Thousands/uL 516*   NEUTROS PCT % 71   LYMPHS PCT % 11*   MONOS PCT % 7   EOS PCT % 10*     Results from last 7 days   Lab Units 09/29/22  1139 09/28/22  1512   SODIUM mmol/L 133* 132*   POTASSIUM mmol/L 4 5 4 7   CHLORIDE mmol/L 103 102   CO2 mmol/L 23 24   BUN mg/dL 18 23   CREATININE mg/dL 1 55* 1 57*   ANION GAP mmol/L 7 6   CALCIUM mg/dL 8 2* 8 1*   ALBUMIN g/dL  --  1 5*   TOTAL BILIRUBIN mg/dL  --  0 29   ALK PHOS U/L  --  144*   ALT U/L  --  42   AST U/L  --  30   GLUCOSE RANDOM mg/dL 115 134         Results from last 7 days   Lab Units 09/29/22  1213 09/29/22  0606 09/28/22  2315 09/28/22  1245 09/27/22  1303 09/27/22  0610 09/27/22  0003 09/26/22  1758 09/26/22  1203 09/26/22  0530 09/25/22  1619 09/25/22  1144   POC GLUCOSE mg/dl 106 95 96 98 100 113 143* 97 104 132 95 148*         Results from last 7 days   Lab Units 09/23/22  1601   LACTIC ACID mmol/L 1 4       Lines/Drains:  Invasive Devices  Report    Drain  Duration           Gastrostomy/Enterostomy Percutaneous Endoscopic Gastrostomy (PEG) 20 Fr  LUQ 20 days                      Imaging: Reviewed radiology reports from this admission including: chest CT scan and abdominal/pelvic CT    Recent Cultures (last 7 days):   Results from last 7 days   Lab Units 09/26/22  1221 09/25/22  1642 09/23/22  1601 09/23/22  1558   BLOOD CULTURE  No Growth at 48 hrs  No Growth at 48 hrs   --   --  No Growth After 5 Days  No Growth After 5 Days     URINE CULTURE   --   --  >100,000 cfu/ml Proteus vulgaris*  >100,000 cfu/ml Citrobacter freundii*  >100,000 cfu/ml Klebsiella oxytoca ESBL*  >100,000 cfu/ml Morganella morganii*  --    C DIFF TOXIN B BY PCR   --  Negative  --   --        Last 24 Hours Medication List:   Current Facility-Administered Medications   Medication Dose Route Frequency Provider Last Rate   • acetaminophen  975 mg Oral Q6H PRN Christin Cabot Bartolo Friedman PA-C     • ALPRAZolam  0 25 mg Oral TID PRN Lara Nichols, DO     • alteplase  2 mg Intracatheter Once Larry Sevilla PA-C     • bisacodyl  10 mg Rectal Daily PRN Merlin Luna, MD     • bisacodyl  10 mg Rectal Once Jason Wall MD     • clonazePAM  3 mg Oral HS Jovita Mcdermott, DO     • folic acid  1 mg Oral Daily Jovita Mcdermott, DO     • guaiFENesin  600 mg Oral Q12H [facility] [place] Checo Brito PA-C     • heparin (porcine)  5,000 Units Subcutaneous [facility] Laratreasure Nichols, DO     • levothyroxine  112 mcg Oral Early Morning Jovita Baldemar, DO     • magnesium hydroxide  30 mL Oral Daily PRN Merlin Luna, MD     • metoclopramide  10 mg Intravenous Q6H PRN Merlin Luna, MD     • midodrine  10 mg Oral TID AC TOLU Orellana     • ondansetron  4 mg Intravenous Q6H PRN Ayse Davis DO     • oxyCODONE  5 mg Oral Q6H Merlin Luna, MD     • pantoprazole  40 mg Oral BID AC Kaylan Arroyo, DO     • polyethylene glycol  17 g Per PEG Tube BID Ayse Davis DO     • senna  2 tablet Oral HS Rommel Shanks MD     • sodium chloride  1 g Oral BID With Meals TOLU Orellana     • sucralfate  1 g Oral 4x Daily (AC & HS) Lara Nichols, DO     • thiamine  100 mg Oral Daily Jovita Baldemar, DO     • traZODone  150 mg Oral HS Jovitakash Mcdermott, DO     • vancomycin  125 mg Oral Q12H Milena Holloway MD          Today, Patient Was Seen By: Lennox Piedra    **Please Note: This note may have been constructed using a voice recognition system  **

## 2022-09-29 NOTE — OCCUPATIONAL THERAPY NOTE
Occupational Therapy Cancel Note    Patient Name: Jaquelin Rhodes  RBTCH'X Date: 9/29/2022 09/29/22 0817   OT Last Visit   OT Visit Date 09/29/22   Note Type   Note type Cancelled Session   Cancel Reasons Refusal       OT orders received  Chart reviewed  Attempted to see pt for OT tx session  Pt irritable and refusing to participate  Provided max encouragement and education  Pt adamantly refusing all tasks at this time, stating "leave me alone " Will continue to follow and see pt as appropriate and able       Rufino Swartz MS, OTR/L

## 2022-09-29 NOTE — PLAN OF CARE
Problem: PHYSICAL THERAPY ADULT  Goal: Performs mobility at highest level of function for planned discharge setting  See evaluation for individualized goals  Description:    Equipment Recommended:  (RW)       See flowsheet documentation for full assessment, interventions and recommendations  Outcome: Not Progressing  Note: Prognosis: Fair  Problem List: Decreased strength, Impaired balance, Decreased mobility, Decreased coordination, Decreased cognition, Impaired judgement, Decreased safety awareness, Decreased skin integrity, Pain, Decreased endurance, Decreased range of motion  Assessment: pt w/ increased confusion; agitation and resisting OOB + care this am  reports poor sleep and not wanting to stay OOB for breakfast  noted soiled linen and pt did xfer OOB to chair w/ assist- but was attempting to stand and impulsively self BTB  Pt adamently refusing any OOB this am and demanding to go BTB- deemed upsafe to be OOB in recliner even w/ chair alarm on; thereefore assisted BTB  Time for repositioning/ offloading of heels  Barriers to Discharge: Inaccessible home environment, Decreased caregiver support     PT Discharge Recommendation: Return to facility with rehabilitation services    See flowsheet documentation for full assessment

## 2022-09-29 NOTE — ASSESSMENT & PLAN NOTE
· Recent PCR test negative  Had recent C diff infection    Continue C diff prophylaxis  for 72 hours after completion of IV abx

## 2022-09-30 NOTE — CONSULTS
Consultation - Klaudia Serrato 76 y o  female MRN: [id number]    Unit/Bed#: [facility] 317-01 Encounter: [id number]      Assessment/Plan     Assessment: This is a 76y o -year-old female with a past medical history of longstanding hypothyroidism, Savage-en-Y gastric bypass, gastric ulcer, severe malnutrition and malabsorption (currently on PEG tube feeding), acute on chronic iron deficiency anemia requiring intermittent transfusions, and hypothyroidism who initially presented due to c diff colitis, but has had a long hospital course complicated by recurrent sepsis  Endocrinology has been consulted due to hypothyroidism management  Plan:  Hypothyroidism  Patient with longstanding hypothyroidism since childhood  At home, patient has been stable on levothyroxine 112 mcg for over 20 years  Last out-patient TSH was 3 49 on 5/24/22  Yesterday, TSH was 43 5 and free T4 was 0 75 on 9/29/22  During hospital course, patient has been on her home dose  Although patient missed past 3 levothyroxine doses, this does not account for level of thyroid dysfunction  Patient has been on PEG feeds since 9/08/22 with continuous feeding between 6pm-10am with intermittent oral feeding  Levothyroxine must be dosed at least 1 hour after eating due to impaired absorption in the small intestine with the presence of food  Patient was given levothyroxine in the morning during the tube feeding  Etiology of hypothyroidism likely due to impaired absorption of levothyroxine due to proximity of tube feeding on top of chronic malabsorption due to Savage-en-Y gastric bypass  The plan is that Levothyroxine will be dosed at 2pm so that there is no interaction with the tube feeds or with potential oral feeds at breakfast, lunch or dinner  GI agreed to d/c patient's sucralfate QID as it also impairs levothyroxine absorption      Plan:  -Give levothyroxine at 2pm to avoid interaction with tube feeds or possible PO intake during breakfast or lunch  -Reached out to GI, agreeable to d/c sucralfate  Inpatient consult to Endocrinology  Consult performed by: Davy Deras  Consult ordered by: Allison Matthews PA-C          CC: Hypothyroidusm  History of Present Illness     HPI: Patient is a 76year old female with a past medical history of Savage-en-Y gastric bypass, gastric ulcer, severe malnutrition and malabsorption (currently on PEG tube feeding), acute on chronic iron deficiency anemia requiring intermittent transfusions, and hypothyroidism who initially presented to the hospital on 8/30/22 due to diarrhea and increased lethargy and weakness secondary to c diff  Patient has had a long hospital course complicated by recurrent sepsis, malnutrition, and encephalopathy  Patient completed IV ertapenem for ESBL UTI on 9/29  Endocrinology consulted due to hypothyroidism  On 9/29, TSH was 43 5 and free T4 0 75  Patient reports longstanding history of hypothyroidism since childhood  Patient has been on the dose of 112 mcg levothyroxine "for as long as [she] could remember" and stable  TSH before hospitalization on 5/24/22 is 3 49  Patient has also refused taking levothyroxine for past 3 days most likely due to encephalopathy; however, on interview patient denies this  Patient reports myalgias, arthralgia, fatigue, brain fog, and brittle hair since hospitalization  Patient has been on PEG tube feeding with oral pleasure feeds since 9/08 with a regimen of continuous feeding between 6pm-10am  Patient ate chicken broth this morning, but reports that she rarely eats and has no appetite  Patient also has been receiving sucralfate QID before meals and bedtime  Patient has been receiving levothyroxine in the mornings during tube feeds and occasionally in close proximity to sucralfate dosing  Review of Systems   Constitutional: Positive for appetite change (impaired hunger) and fatigue  HENT: Negative  Eyes: Negative for photophobia and visual disturbance  Respiratory: Negative for apnea, cough, choking, chest tightness and shortness of breath  Cardiovascular: Negative  Gastrointestinal: Positive for abdominal pain (At PEG site)  Negative for constipation and diarrhea  Endocrine: Negative for cold intolerance, heat intolerance, polydipsia, polyphagia and polyuria  Genitourinary: Negative for difficulty urinating, enuresis, flank pain, frequency, genital sores, pelvic pain and urgency  Musculoskeletal: Positive for arthralgias, back pain and myalgias  Skin: Positive for pallor  Negative for color change, rash and wound  Neurological: Positive for weakness  Negative for dizziness, tremors, facial asymmetry, light-headedness and headaches  Hematological: Negative for adenopathy  Does not bruise/bleed easily  Psychiatric/Behavioral: Positive for agitation and confusion (Currently oriented x3, but patient says she gets confused intermittently)         Historical Information   Past Medical History:   Diagnosis Date   • Anemia    • Anxiety    • Bipolar disorder ([facility] [address] )    • Colon polyp    • Disease of thyroid gland    • Essential hypertension    • Essential tremor    • Fibromyalgia, primary    • GERD (gastroesophageal reflux disease)    • Inflammatory polyarthropathy (HCC)    • Mammogram abnormal    • Pressure injury of skin      Past Surgical History:   Procedure Laterality Date   • BREAST BIOPSY Right 2015    benign   • CARPAL TUNNEL RELEASE Bilateral    • COLONOSCOPY     • EGD AND COLONOSCOPY  01/01/2014   • GASTRIC BYPASS  07/01/2012   • MAMMO NEEDLE LOCALIZATION RIGHT ([employer]) Right 2/6/2012   • MAMMO NEEDLE LOCALIZATION RIGHT (ALL [employer]) Right 2/6/2012   • ULNAR NERVE TRANSPOSITION Right      Social History   Social History     Substance and Sexual Activity   Alcohol Use Yes   • Alcohol/week: 4 0 standard drinks   • Types: 4 Glasses of wine per week    Comment: rare     Social History     Substance and Sexual Activity   Drug Use Never     Social History     Tobacco Use   Smoking Status Former Smoker   • Quit date:    • Years since quittin 7   Smokeless Tobacco Never Used     Family History:   Family History   Problem Relation Age of Onset   • No Known Problems Mother    • No Known Problems Father    • No Known Problems Sister    • No Known Problems Maternal Grandmother    • No Known Problems Maternal Grandfather    • No Known Problems Paternal Grandmother    • No Known Problems Paternal Grandfather    • No Known Problems Sister    • No Known Problems Sister    • Stomach cancer Maternal Aunt    • No Known Problems Maternal Aunt    • No Known Problems Maternal Aunt    • No Known Problems Maternal Aunt    • No Known Problems Paternal Aunt    • Leukemia Other        Meds/Allergies   Current Facility-Administered Medications   Medication Dose Route Frequency Provider Last Rate Last Admin   • acetaminophen (TYLENOL) tablet 975 mg  975 mg Oral Q6H PRN Cecilio Martinez PA-C   975 mg at 22 2315   • ALPRAZolam (XANAX) tablet 0 25 mg  0 25 mg Oral TID PRN Tommy Juarez DO   0 25 mg at 22 1616   • alteplase (CATHFLO) injection 2 mg  2 mg Intracatheter Once Larry Sevilla PA-C       • bisacodyl (DULCOLAX) rectal suppository 10 mg  10 mg Rectal Daily PRN Melissa Olvera MD       • bisacodyl (DULCOLAX) rectal suppository 10 mg  10 mg Rectal Once Natividad Hsu MD       • clonazePAM (KlonoPIN) tablet 3 mg  3 mg Oral HS Jovita Mcdermott, DO   3 mg at 21/64/58 5100   • folic acid (FOLVITE) tablet 1 mg  1 mg Oral Daily Jovita Mcdermott, DO   1 mg at 22 1012   • guaiFENesin (MUCINEX) 12 hr tablet 600 mg  600 mg Oral Q12H [address] Cecilio Martinez PA-C   600 mg at 22 1012   • heparin (porcine) subcutaneous injection 5,000 Units  5,000 Units Subcutaneous [facility] Sanders Ann, DO   5,000 Units at 22 1403   • levothyroxine tablet 112 mcg  112 mcg Oral Early Morning Jovita Mcdermott, DO   112 mcg at 22 0725   • magnesium hydroxide (MILK OF MAGNESIA) oral suspension 30 mL  30 mL Oral Daily PRN Kimberly Wright MD   30 mL at 09/15/22 1421   • metoclopramide (REGLAN) injection 10 mg  10 mg Intravenous Q6H PRN Kimberly Wright MD       • midodrine (PROAMATINE) tablet 10 mg  10 mg Oral TID AC Jud BYRNE TOLU Mcwilliams   10 mg at 09/29/22 1738   • ondansetron (ZOFRAN) injection 4 mg  4 mg Intravenous Q6H PRN Jovita Mcdermott, DO   4 mg at 09/18/22 1339   • oxyCODONE (ROXICODONE) IR tablet 5 mg  5 mg Oral Q6H Kimberly Wright MD   5 mg at 09/30/22 1012   • pantoprazole (PROTONIX) EC tablet 40 mg  40 mg Oral BID AC Kaylan Arroyo, DO   40 mg at 09/29/22 1738   • polyethylene glycol (MIRALAX) packet 17 g  17 g Per PEG Tube BID Jovitaaixa Mcdermott, DO   17 g at 09/24/22 2114   • senna (SENOKOT) tablet 17 2 mg  2 tablet Oral HS Kristie Butler MD   17 2 mg at 09/28/22 2115   • sodium chloride tablet 1 g  1 g Oral TID Clementine Sanchez, DO   1 g at 09/30/22 1012   • sucralfate (CARAFATE) tablet 1 g  1 g Oral 4x Daily (AC & HS) Marquita Spatz, DO   1 g at 09/29/22 1739   • thiamine tablet 100 mg  100 mg Oral Daily Jovita Mcdermott, DO   100 mg at 09/30/22 1012   • traZODone (DESYREL) tablet 150 mg  150 mg Oral HS Jovita Mcdermott, DO   150 mg at 09/28/22 2115   • vancomycin (VANCOCIN) oral solution 125 mg  125 mg Oral Q12H [address] Ashli Parmar MD   125 mg at 09/30/22 1012     No Known Allergies    Objective   Vitals: Blood pressure 106/53, pulse 84, temperature 97 9 °F (36 6 °C), temperature source Oral, resp  rate 14, height 5' 4" (1 626 m), weight 48 3 kg (106 lb 7 7 oz), SpO2 93 %  Intake/Output Summary (Last 24 hours) at 9/30/2022 1249  Last data filed at 9/30/2022 1120  Gross per 24 hour   Intake 120 ml   Output --   Net 120 ml     Invasive Devices  Report    Drain  Duration           Gastrostomy/Enterostomy Percutaneous Endoscopic Gastrostomy (PEG) 20 Fr  LUQ 21 days                Physical Exam  Constitutional:       General: She is not in acute distress  Appearance: She is ill-appearing  Comments: Not in acute distress, appears cachectic    HENT:      Head: Normocephalic and atraumatic  Mouth/Throat:      Mouth: Mucous membranes are dry  Pharynx: Oropharynx is clear  Eyes:      Extraocular Movements: Extraocular movements intact  Conjunctiva/sclera: Conjunctivae normal       Pupils: Pupils are equal, round, and reactive to light  Cardiovascular:      Rate and Rhythm: Normal rate and regular rhythm  Pulses: Normal pulses  Heart sounds: Normal heart sounds  No murmur heard  No friction rub  No gallop  Pulmonary:      Effort: Pulmonary effort is normal  No respiratory distress  Breath sounds: Normal breath sounds  No stridor  No wheezing or rales  Abdominal:      General: Abdomen is flat  Bowel sounds are normal  There is no distension  Palpations: There is no mass  Tenderness: There is abdominal tenderness (At PEG tube site, no erythema/swelling/drainage at site, and looks clean)  There is no right CVA tenderness, left CVA tenderness or rebound  Hernia: No hernia is present  Musculoskeletal:         General: Normal range of motion  Cervical back: Normal range of motion and neck supple  Right lower leg: No edema  Left lower leg: No edema  Skin:     General: Skin is warm and dry  Capillary Refill: Capillary refill takes less than 2 seconds  Neurological:      General: No focal deficit present  Mental Status: She is alert and oriented to person, place, and time  Mental status is at baseline  Cranial Nerves: No cranial nerve deficit  Sensory: No sensory deficit  Motor: Weakness (Generalized weakness) present  Coordination: Coordination normal       Gait: Gait normal       Deep Tendon Reflexes: Reflexes normal       Comments: Was oriented x3     Psychiatric:         Mood and Affect: Mood normal          Behavior: Behavior normal          Thought Content:  Thought content normal          Judgment: Judgment normal          The history was obtained from the review of the chart, patient  Lab Results:       Lab Results   Component Value Date    WBC 13 08 (H) 09/29/2022    HGB 7 5 (L) 09/29/2022    HCT 23 8 (L) 09/29/2022    MCV 97 09/29/2022     (H) 09/29/2022     Lab Results   Component Value Date/Time    BUN 18 09/29/2022 11:39 AM    BUN 17 11/14/2020 10:02 AM    K 4 5 09/29/2022 11:39 AM    K 4 5 11/14/2020 10:02 AM     09/29/2022 11:39 AM     11/14/2020 10:02 AM    CO2 23 09/29/2022 11:39 AM    CO2 30 11/14/2020 10:02 AM    CREATININE 1 55 (H) 09/29/2022 11:39 AM    AST 30 09/28/2022 03:12 PM    AST 27 11/14/2020 10:02 AM    ALT 42 09/28/2022 03:12 PM    ALT 21 11/14/2020 10:02 AM    ALB 1 5 (L) 09/28/2022 03:12 PM    GLOB 3 0 11/14/2020 10:02 AM     No results for input(s): CHOL, HDL, LDL, TRIG, VLDL in the last 72 hours  No results found for: Terrial Habermann  POC Glucose (mg/dl)   Date Value   09/29/2022 91   09/29/2022 106       Imaging Studies: I have personally reviewed pertinent reports  Portions of the record may have been created with voice recognition software

## 2022-09-30 NOTE — ASSESSMENT & PLAN NOTE
· Exacerbated by diarrhea/dehydration however patient is chronically malnourished     · S/p PT/OT evaluation  · CM following for safe discharge planning - patient was resistant to working with therapy per CM notes and facility to re-evaluate Monday per

## 2022-09-30 NOTE — PHYSICAL THERAPY NOTE
Physical Therapy Cancellation Note         09/30/22 1600   PT Last Visit   PT Visit Date 09/30/22   Note Type   Note Type Treatment   Cancel Reasons   (refused)

## 2022-09-30 NOTE — ASSESSMENT & PLAN NOTE
· Was started on Na Cl tablets this admission and had persistent hyponatremia  · Nephrology consulted - salt tabs were increased  · Nutrition consulted for further recs regarding tube feeds  · AM labs pending

## 2022-09-30 NOTE — PROGRESS NOTES
NEPHROLOGY PROGRESS NOTE   Noemy Almanzar 76 y o  female MRN: [id number]  Unit/Bed#: [facility] 317-01 Encounter: [id number]        ASSESSMENT & PLAN    78-year-old female with a history of bipolar disorder, depression, GERD, iron deficiency who originally presented with diarrhea was treated for presumed line infection possible C diff but had an ESBL UTI which was treated complicated by an elevated creatinine     1  Elevated creatinine-patient has a fluctuating creatinine between 0 8 and 1 2 creatinine has been around 1 3-1 5 this is in the setting of sepsis with a recent urinary tract infection   Patient has good urine output  Systolic blood pressures are low but most over maps are acceptable at this point we can just monitor  Given hyponatremia a m  Cortisol checked at 1:00 p m  Will likely be inaccurate patient was refusing labs this morning     2  Hypotension-patient remains on midodrine 10 mg t i d  At this point can continue and monitor if any signs of hypoperfusion can up titrate dose     3  Hyponatremia-this is likely related to poor solute intake with gastric bypass were checking an a m  Cortisol can continue salt tablets and try to keep above 130 encourage solute intake and consult nutrition  Increase salt tabs to 1 g t i d  If tolerating  Patient's urinary studies are pending     4  Acute on chronic anemia-transfuse if less than 7     Overall clinical picture she, she is 76years old she has some chronic hyponatremia but this is above 130 with some underlying CKD and borderline low blood pressure refusing labs intermittently  she is making urine she is on tube feeds for nutrition  Will follow-up on blood work and efforts should be made to optimize nutritional status and goals of care    SUBJECTIVE:    Patient was seen today  She has no chest pain or shortness of breath no fevers or chills    12 point review of systems was otherwise negative besides what is mentioned above      Medications:    Current Facility-Administered Medications:   •  acetaminophen (TYLENOL) tablet 975 mg, 975 mg, Oral, Q6H PRN, Jose Palmer PA-C, 975 mg at 09/26/22 2315  •  ALPRAZolam Rodolph Pont) tablet 0 25 mg, 0 25 mg, Oral, TID PRN, Lanice Heal, DO, 0 25 mg at 09/24/22 1616  •  alteplase (CATHFLO) injection 2 mg, 2 mg, Intracatheter, Once, Maxine Siemens, PA-C  •  bisacodyl (DULCOLAX) rectal suppository 10 mg, 10 mg, Rectal, Daily PRN, Nan Teixeira MD  •  bisacodyl (DULCOLAX) rectal suppository 10 mg, 10 mg, Rectal, Once, David Curtis MD  •  clonazePAM (KlonoPIN) tablet 3 mg, 3 mg, Oral, HS, Jovita Mcdermott, DO, 3 mg at 39/76/31 2702  •  folic acid (FOLVITE) tablet 1 mg, 1 mg, Oral, Daily, Jovita Mcdermott, DO, 1 mg at 09/30/22 1012  •  guaiFENesin (MUCINEX) 12 hr tablet 600 mg, 600 mg, Oral, Q12H [address], Aurelia Encinas PA-C, 600 mg at 09/30/22 1012  •  heparin (porcine) subcutaneous injection 5,000 Units, 5,000 Units, Subcutaneous, Q8H [address], Lanice Heal, DO, 5,000 Units at 09/27/22 1403  •  levothyroxine tablet 112 mcg, 112 mcg, Oral, Early Morning, Jovita Mcdermott, DO, 112 mcg at 09/27/22 0725  •  magnesium hydroxide (MILK OF MAGNESIA) oral suspension 30 mL, 30 mL, Oral, Daily PRN, Nan Teixeira MD, 30 mL at 09/15/22 1421  •  metoclopramide (REGLAN) injection 10 mg, 10 mg, Intravenous, Q6H PRN, Nan Teixeira MD  •  midodrine (PROAMATINE) tablet 10 mg, 10 mg, Oral, TID ACJud CRNP, 10 mg at 09/30/22 1321  •  ondansetron (ZOFRAN) injection 4 mg, 4 mg, Intravenous, Q6H PRN, Jovita Mcdermott DO, 4 mg at 09/18/22 1339  •  oxyCODONE (ROXICODONE) IR tablet 5 mg, 5 mg, Oral, Q6H, Nan Teixeira MD, 5 mg at 09/30/22 1012  •  pantoprazole (PROTONIX) EC tablet 40 mg, 40 mg, Oral, BID AC, Kaylan Arroyo DO, 40 mg at 09/29/22 1738  •  polyethylene glycol (MIRALAX) packet 17 g, 17 g, Per PEG Tube, BID, Jovita Mcdermott DO, 17 g at 09/24/22 2114  •  senna (SENOKOT) tablet 17 2 mg, 2 tablet, Oral, HS, Geoff Brome, MD, 17 2 mg at 09/28/22 2115  •  sodium chloride tablet 1 g, 1 g, Oral, TID, Dylon Landis, DO, 1 g at 09/30/22 1012  •  sucralfate (CARAFATE) tablet 1 g, 1 g, Oral, 4x Daily (AC & HS), Nakita Bear, DO, 1 g at 09/30/22 1321  •  thiamine tablet 100 mg, 100 mg, Oral, Daily, Jovita Mcdermott, DO, 100 mg at 09/30/22 1012  •  traZODone (DESYREL) tablet 150 mg, 150 mg, Oral, HS, Jovita Mcdermott, DO, 150 mg at 09/28/22 2115  •  vancomycin (VANCOCIN) oral solution 125 mg, 125 mg, Oral, Q12H [address], Beatrice Herman MD, 125 mg at 09/30/22 1012    OBJECTIVE:    Vitals:    09/27/22 2230 09/28/22 0903 09/28/22 1546 09/30/22 0840   BP:  (!) 96/49 99/50 106/53   BP Location:  Right arm Right arm Left arm   Pulse: 96 83 82 84   Resp: 16 16 16 14   Temp:  98 1 °F (36 7 °C) 97 6 °F (36 4 °C) 97 9 °F (36 6 °C)   TempSrc:  Oral Oral Oral   SpO2: 92% 98% 91% 93%   Weight:       Height:            Temp:  [97 9 °F (36 6 °C)] 97 9 °F (36 6 °C)  HR:  [84] 84  Resp:  [14] 14  BP: (106)/(53) 106/53  SpO2:  [93 %] 93 %     Body mass index is 18 28 kg/m²      Weight (last 2 days)     None          I/O last 3 completed shifts:  In: -   Out: 900 [Urine:900]    I/O this shift:  In: 120 [P O :120]  Out: -       Physical exam:    General:  Frail and cachectic  Eyes: conjunctivae pink, anicteric sclerae  ENT: lips and mucous membranes moist, no exudates, normal external ears  Neck: ROM intact, no JVD  Chest: No respiratory distress, no accessory muscle use  CVS: normal rate, non pericardial friction rub  Abdomen: soft, non-tender, non-distended, normoactive bowel sounds  Extremities: no edema of both legs  Skin: no rash  Neuro: awake, alert, oriented, grossly intact  Psych:  Pleasant affect    Invasive Devices:      Lab Results:   Results from last 7 days   Lab Units 09/29/22  1139 09/28/22  1512 09/28/22  1043 09/27/22  0909 09/26/22  1221 09/26/22  1025 09/25/22  1001 09/24/22  1746 09/23/22  2244 09/23/22  1601 09/23/22  1601 09/23/22  1558   WBC Thousand/uL 13 08* 13 42*  --  11 31*  --  14 50* 18 02*   < >  --   --  14 24*  --    HEMOGLOBIN g/dL 7 5* 7 8*  --  8 0*  --  8 0* 9 3*   < >  --   --  7 4*  --    HEMATOCRIT % 23 8* 25 1*  --  25 6*  --  26 2* 29 6*   < >  --   --  23 4*  --    PLATELETS Thousands/uL 516* 492*  --  419*  --  483* 482*   < >  --   --  579*  --    POTASSIUM mmol/L 4 5 4 7 5 7* 4 9  --  5 2  --    < > 5 3   < > 5 4*  --    CHLORIDE mmol/L 103 102 102 100  --  101  --    < > 99   < > 98  --    CO2 mmol/L 23 24 24 25  --  24  --    < > 24   < > 24  --    BUN mg/dL 18 23 23 25  --  24  --    < > 29*   < > 28*  --    CREATININE mg/dL 1 55* 1 57* 1 49* 1 36*  --  1 30  --    < > 1 54*   < > 1 52*  --    CALCIUM mg/dL 8 2* 8 1* 8 8 8 4  --  7 7*  --    < > 8 3   < > 8 7  --    ALK PHOS U/L  --  144* 156*  --   --  206*  --   --  211*  --  228*  --    ALT U/L  --  42 47  --   --  83*  --   --  53  --  61  --    AST U/L  --  30 52*  --   --  129*  --   --  63*  --  84*  --    BLOOD CULTURE   --   --   --   --  No Growth at 72 hrs  No Growth at 72 hrs   --   --   --   --   --   --  No Growth After 5 Days  No Growth After 5 Days  LEUKOCYTES UA   --   --   --   --   --   --   --   --   --   --  Trace*  --    BLOOD UA   --   --   --   --   --   --   --   --   --   --  Trace*  --     < > = values in this interval not displayed  Portions of the record may have been created with voice recognition software  Occasional wrong word or "sound a like" substitutions may have occurred due to the inherent limitations of voice recognition software  Read the chart carefully and recognize, using context, where substitutions have occurred  If you have any questions, please contact the dictating provider

## 2022-09-30 NOTE — PROGRESS NOTES
[facility]  Progress Note - Agapito Pennington 1945, 76 y o  female MRN: [id number]  Unit/Bed#: [facility] 317-01 Encounter: [id number]  Primary Care Provider: Lise Ray MD   Date and time admitted to hospital: 8/30/2022 11:49 PM    * Sepsis [facility])  Assessment & Plan  · Recurrent sepsis  Presented with fever, tachypnea, tachycardia leukocytosis  Patient presented with PICC line which was in place for TPN, patient was treated for presumed line infection  Repeat blood cultures negative  Was also treated for possible C diff infection and remains on prophylactic vancomycin  Doppler with right forearm superficial thrombophlebitis  Had isolated fever 9/23 therefore ID was reconsulted, patient was having reports of dysuria  · ID following, completed intravenous ertapenem for ESBL UTI 9/29 for a total of 3 doses  · Repeat CT chest abdomen pelvis 9/27-for acute infectious etiology, incidental findings as below  · Repeat blood cultures no growth to date  · COVID-19 PCR negative  · Trend temps and leukocytosis  Has been afebrile since 09/26  Bacteremia  Assessment & Plan  · Recent hx of staph epi bacteremia in 7/2022, presumed due to picc line  · Recurrence 1/2 set staph epi, 2nd set without growth  Was treated with intravenous vancomycin for presumed line infection  · S/p TAVIA (9/9) no evidence of vegetation  · Blood cultures had been negative however repeated again due to isolated fever which are negative to date  · PICC and TPN discontinued, now has PEG tube  Diarrhea  Assessment & Plan  · Recent PCR test negative  Had recent C diff infection    Continue C diff prophylaxis  for 72 hours after completion of IV abx    NOAH (acute kidney injury) ([facility] [address] )  Assessment & Plan  · Baseline creatinine appears to be 0 9-1 1  · Creatinine was 1 1 on last discharge  · Creatinine has been 1 3s-1 5s  · Nephrology consult appreciated  · AM labs pending - patient agreeable to having them drawn now, RN aware     Hyponatremia  Assessment & Plan  · Was started on Na Cl tablets this admission and had persistent hyponatremia  · Nephrology consulted - salt tabs were increased  · Nutrition consulted for further recs regarding tube feeds  · AM labs pending       Acute encephalopathy  Assessment & Plan  · Patient waxes and wanes  · ? From prolonged hospital stay with  induced delirium vs metabolic encephalopathy  · No focal deficit on exam  · Delirium precautions      Urinary retention  Assessment & Plan  · Has required intermittent catheterizations however now voiding spontaneously  · Continue retention protocol    Abnormal CT scan  Assessment & Plan  Noted on CT C/A/P 9/26  · Focal thickening of the lower portion of the endometrium  While this is not expected in a patient of this age, this appears stable from 2016 suggesting a benign etiology  If there is clinical concern, follow-up ultrasound is recommended  · Small left upper lobe nodules, one of which is new from March 2022  Given the history of smoking, a follow-up chest CT is recommended in 12 months  · This was d/w sister and brother POA over phone by prior provider 9/28    Acute on chronic anemia  Assessment & Plan  · Evaluated by GI prior  · 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  · S/p recent EGD in 7/22 revealing: Residual marginal ulcer of the gastrojejunostomy anastomosis with improved size  · S/p EGD 9/2 revealing: Large, cratered ulcer in the gastrojejunal anastomosis   · Received PRBC transfusion x 1 this admission,  continue monitoring transfuse less than 7  · Cont PPI, BID, carafate    Ambulatory dysfunction  Assessment & Plan  · Exacerbated by diarrhea/dehydration however patient is chronically malnourished     · S/p PT/OT evaluation  · CM following for safe discharge planning - patient was resistant to working with therapy per CM notes and facility to re-evaluate Monday per        Goals of care, counseling/discussion  Assessment & Plan  · Previously evaluated by palliative care  Wants to continue medical directed treatments with limits of DNR/DNI  · Continue current pain management regimen with scheduled oxycodone and additional p r n  Doses  Should follow-up with palliative Care as outpatient  Hypotension  Assessment & Plan  · Continue midodrine  · With NOAH  · Nephrology following    H/O bariatric surgery - bypass  Assessment & Plan  Patient with history of gastric bypass surgery  Has had significant malnutrition issues and anastomotic ulcerations  Seen by Bariatric Service during recent hospitalization at [facility] who recommended prolonged course of TPN for nutritional optimization and possible revision of surgery in the future  · Was planned to have appt with bariatric sx on 8/20 however had to be rescheduled  · Given recurrent bacteremia; s/p GI consult; recommended discontinuation of TPN  S/p PEG, TF initiated- nutrition adjusted rate of TF  · P O  Diet for pleasure feeding resumed  · To f/u with bariatric surgery      Moderate protein-calorie malnutrition ([address] )  Assessment & Plan  Malnutrition Findings:   Adult Malnutrition type: Chronic illness  Adult Degree of Malnutrition: Other severe protein calorie malnutrition  Malnutrition Characteristics: Weight loss, Muscle loss       360 Statement: Severe/Chronic malnutrition r/t condition, as evidenced by 4 8% wt loss x < 1 week (9/2/22: 125#, 9/5/22: 119#), and severe muscle mass depletion (temples/clavicle)  Treated with liberalized diet and nutrition supplements, possibly nutrition support pending goals of care    BMI Findings: Body mass index is 18 28 kg/m²  · Hx of Savage-en Y gastric bypass, 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  Now off due to recurrent bacteremia  · S/p PEG placement and on PO feeds   Cont nutritional supplements  · Nutrition consulted given hyponatremia as above    Transaminitis  Assessment & Plan  · NO RUQ pain, USG with gallstones, no choelcystitis  · Will trend    Hypothyroidism  Assessment & Plan  · TSH elevated and fT4 low  · Endocrinology consulted        VTE Pharmacologic Prophylaxis: VTE Score: 3 Moderate Risk (Score 3-4) - Pharmacological DVT Prophylaxis Ordered: heparin  Patient Centered Rounds: I performed bedside rounds with nursing staff today  d/w RN  Discussions with Specialists or Other Care Team Provider: Aleks Endo and ID     Education and Discussions with Family / Patient: Updated  (brother) via phone  Time Spent for Care: 30 minutes  More than 50% of total time spent on counseling and coordination of care as described above  Current Length of Stay: 30 day(s)  Current Patient Status: Inpatient   Certification Statement: The patient will continue to require additional inpatient hospital stay due to NOAH, abnormal TFTs, hyponatremia, rehab  Discharge Plan: Anticipate discharge in >72 hrs to rehab facility  Code Status: Level 3 - DNAR and DNI    Subjective:   Ms Kevan Francisco reports some discomfort at feeding tube site  Otherwise denies complaint  She is agreeable to AM labs    Objective:     Vitals:   Temp (24hrs), Av 9 °F (36 6 °C), Min:97 9 °F (36 6 °C), Max:97 9 °F (36 6 °C)    Temp:  [97 9 °F (36 6 °C)] 97 9 °F (36 6 °C)  HR:  [84] 84  Resp:  [14] 14  BP: (106)/(53) 106/53  SpO2:  [93 %] 93 %  Body mass index is 18 28 kg/m²  Input and Output Summary (last 24 hours):   No intake or output data in the 24 hours ending 22 1025    Physical Exam:   Physical Exam  Vitals reviewed  Exam conducted with a chaperone present  Constitutional:       Comments: Patient seen lying in bed with RN present, chronically ill appearing   Cardiovascular:      Rate and Rhythm: Normal rate and regular rhythm  Pulmonary:      Effort: Pulmonary effort is normal       Breath sounds: Normal breath sounds  Abdominal:      General: Bowel sounds are normal       Palpations: Abdomen is soft  Tenderness: There is no abdominal tenderness  Musculoskeletal:      Right lower leg: No edema  Left lower leg: No edema  Skin:     General: Skin is warm  Neurological:      Mental Status: She is alert  Comments: Oriented to person, [facility] and year   Psychiatric:         Mood and Affect: Mood normal           Additional Data:     Labs:  Results from last 7 days   Lab Units 09/29/22  1139   WBC Thousand/uL 13 08*   HEMOGLOBIN g/dL 7 5*   HEMATOCRIT % 23 8*   PLATELETS Thousands/uL 516*   NEUTROS PCT % 71   LYMPHS PCT % 11*   MONOS PCT % 7   EOS PCT % 10*     Results from last 7 days   Lab Units 09/29/22  1139 09/28/22  1512   SODIUM mmol/L 133* 132*   POTASSIUM mmol/L 4 5 4 7   CHLORIDE mmol/L 103 102   CO2 mmol/L 23 24   BUN mg/dL 18 23   CREATININE mg/dL 1 55* 1 57*   ANION GAP mmol/L 7 6   CALCIUM mg/dL 8 2* 8 1*   ALBUMIN g/dL  --  1 5*   TOTAL BILIRUBIN mg/dL  --  0 29   ALK PHOS U/L  --  144*   ALT U/L  --  42   AST U/L  --  30   GLUCOSE RANDOM mg/dL 115 134         Results from last 7 days   Lab Units 09/29/22  1752 09/29/22  1213 09/29/22  0606 09/28/22  2315 09/28/22  1245 09/27/22  1303 09/27/22  0610 09/27/22  0003 09/26/22  1758 09/26/22  1203 09/26/22  0530 09/25/22  1619   POC GLUCOSE mg/dl 91 106 95 96 98 100 113 143* 97 104 132 95         Results from last 7 days   Lab Units 09/23/22  1601   LACTIC ACID mmol/L 1 4       Lines/Drains:  Invasive Devices  Report    Drain  Duration           Gastrostomy/Enterostomy Percutaneous Endoscopic Gastrostomy (PEG) 20 Fr  LUQ 21 days                      Imaging: No pertinent imaging reviewed  Recent Cultures (last 7 days):   Results from last 7 days   Lab Units 09/26/22  1221 09/25/22  1642 09/23/22  1601 09/23/22  1558   BLOOD CULTURE  No Growth at 72 hrs  No Growth at 72 hrs   --   --  No Growth After 5 Days  No Growth After 5 Days     URINE CULTURE   --   --  >100,000 cfu/ml Proteus vulgaris*  >100,000 cfu/ml Citrobacter freundii*  >100,000 cfu/ml Klebsiella oxytoca ESBL*  >100,000 cfu/ml Morganella morganii*  --    C DIFF TOXIN B BY PCR   --  Negative  --   --        Last 24 Hours Medication List:   Current Facility-Administered Medications   Medication Dose Route Frequency Provider Last Rate   • acetaminophen  975 mg Oral Q6H PRN Wes Carvalho PA-C     • ALPRAZolam  0 25 mg Oral TID PRN Melissa Rajput DO     • alteplase  2 mg Intracatheter Once Checo Palumbo PA-C     • bisacodyl  10 mg Rectal Daily PRN Justine Cardenas MD     • bisacodyl  10 mg Rectal Once Ana Oseguera MD     • clonazePAM  3 mg Oral HS Jovita Mcdermott, DO     • folic acid  1 mg Oral Daily Jovita Mcdermott, DO     • guaiFENesin  600 mg Oral Q12H [address] Wes Carvalho PA-C     • heparin (porcine)  5,000 Units Subcutaneous Q8H [address] Melissa Rajput DO     • levothyroxine  112 mcg Oral Early Morning Jovita Baldemar, DO     • magnesium hydroxide  30 mL Oral Daily PRN Justine Cardenas MD     • metoclopramide  10 mg Intravenous Q6H PRN Justine Cardenas MD     • midodrine  10 mg Oral TID AC TOLU Orellana     • ondansetron  4 mg Intravenous Q6H PRN Alcon Ray DO     • oxyCODONE  5 mg Oral Q6H Justine Cardenas MD     • pantoprazole  40 mg Oral BID AC Kaylan Arroyo DO     • polyethylene glycol  17 g Per PEG Tube BID Alcon Ray DO     • senna  2 tablet Oral HS Lucinda Pérez MD     • sodium chloride  1 g Oral TID Sonny Lowery, DO     • sucralfate  1 g Oral 4x Daily (AC & HS) Melissa Rajput DO     • thiamine  100 mg Oral Daily Jovita Mcdermott, DO     • traZODone  150 mg Oral HS Jovita Mcdermott, DO     • vancomycin  125 mg Oral Q12H Vinh Mora MD          Today, Patient Was Seen By: Sachin Rendon    **Please Note: This note may have been constructed using a voice recognition system  **

## 2022-09-30 NOTE — PLAN OF CARE
Problem: Potential for Falls  Goal: Patient will remain free of falls  Description: INTERVENTIONS:  - Educate patient/family on patient safety including physical limitations  - Instruct patient to call for assistance with activity   - Consult OT/PT to assist with strengthening/mobility   - Keep Call bell within reach  - Keep bed low and locked with side rails adjusted as appropriate  - Keep care items and personal belongings within reach  - Initiate and maintain comfort rounds  - Make Fall Risk Sign visible to staff  - Offer Toileting every Hours, in advance of need  - Initiate/Maintain alarm  - Obtain necessary fall risk management equipment:   - Apply yellow socks and bracelet for high fall risk patients  - Consider moving patient to room near nurses station  Outcome: Progressing     Problem: SAFETY ADULT  Goal: Patient will remain free of falls  Description: INTERVENTIONS:  - Educate patient/family on patient safety including physical limitations  - Instruct patient to call for assistance with activity   - Consult OT/PT to assist with strengthening/mobility   - Keep Call bell within reach  - Keep bed low and locked with side rails adjusted as appropriate  - Keep care items and personal belongings within reach  - Initiate and maintain comfort rounds  - Make Fall Risk Sign visible to staff  - Offer Toileting every  Hours, in advance of need  - Initiate/Maintain alarm  - Obtain necessary fall risk management equipment:   - Apply yellow socks and bracelet for high fall risk patients  - Consider moving patient to room near nurses station  Outcome: Progressing     Problem: SKIN/TISSUE INTEGRITY - ADULT  Goal: Pressure injury heals and does not worsen  Description: Interventions:  - Implement low air loss mattress or specialty surface (Criteria met)  - Apply silicone foam dressing  - Instruct/assist with weight shifting every minutes when in chair   - Limit chair time to hour intervals  - Use special pressure reducing interventions such as  when in chair   - Apply fecal or urinary incontinence containment device   - Perform passive or active ROM every   - Turn and reposition patient & offload bony prominences every hours   - Utilize friction reducing device or surface for transfers   - Consider consults to  interdisciplinary teams such as   - Use incontinent care products after each incontinent episode such as   - Consider nutrition services referral as needed  Outcome: Progressing     Problem: Prexisting or High Potential for Compromised Skin Integrity  Goal: Skin integrity is maintained or improved  Description: INTERVENTIONS:  - Identify patients at risk for skin breakdown  - Assess and monitor skin integrity  - Assess and monitor nutrition and hydration status  - Monitor labs   - Assess for incontinence   - Turn and reposition patient  - Assist with mobility/ambulation  - Relieve pressure over bony prominences  - Avoid friction and shearing  - Provide appropriate hygiene as needed including keeping skin clean and dry  - Evaluate need for skin moisturizer/barrier cream  - Collaborate with interdisciplinary team   - Patient/family teaching  - Consider wound care consult   Outcome: Progressing

## 2022-09-30 NOTE — PLAN OF CARE
Problem: SKIN/TISSUE INTEGRITY - ADULT  Goal: Skin Integrity remains intact(Skin Breakdown Prevention)  Description: Assess:  -Perform Gabe assessment every   -Clean and moisturize skin every  -Inspect skin when repositioning, toileting, and assisting with ADLS  -Assess under medical devices such as every   -Assess extremities for adequate circulation and sensation     Bed Management:  -Have minimal linens on bed & keep smooth, unwrinkled  -Change linens as needed when moist or perspiring  -Avoid sitting or lying in one position for more thanhours while in bed  -Keep HOB at degrees     Toileting:  -Offer bedside commode  -Assess for incontinence every   -Use incontinent care products after each incontinent episode such as     Activity:  -Mobilize patient  times a day  -Encourage activity and walks on unit  -Encourage or provide ROM exercises   -Turn and reposition patient every  Hours  -Use appropriate equipment to lift or move patient in bed  -Instruct/ Assist with weight shifting every  when out of bed in chair  -Consider limitation of chair time  hour intervals    Skin Care:  -Avoid use of baby powder, tape, friction and shearing, hot water or constrictive clothing  -Relieve pressure over bony prominences using   -Do not massage red bony areas    Next Steps:  -Teach patient strategies to minimize risks such as    -Consider consults to  interdisciplinary teams such as   Outcome: Progressing

## 2022-09-30 NOTE — NUTRITION
Consult for hyponatremia  Pt on sodium tabs  Receiving EN providing 1248 kcal, 577 gm Pro and 1039 ml free water  Est fluid needs ~ 1200 ml   PO fluid intake unknown    Na lab has been up and down +NOAH   1) Cont EN as ordered   2) may want to do a 48 hr I/O of PO fluids   3) Consider obtain urine osmolarity - will reassess fluid flushes at FU   4) Obtain current wt    See RD note in flowsheet for additional detail

## 2022-09-30 NOTE — ASSESSMENT & PLAN NOTE
Patient with history of gastric bypass surgery  Has had significant malnutrition issues and anastomotic ulcerations  Seen by Bariatric Service during recent hospitalization at [facility] who recommended prolonged course of TPN for nutritional optimization and possible revision of surgery in the future  · Was planned to have appt with bariatric sx on 8/20 however had to be rescheduled  · Given recurrent bacteremia; s/p GI consult; recommended discontinuation of TPN  S/p PEG, TF initiated- nutrition adjusted rate of TF  · P O   Diet for pleasure feeding resumed  · To f/u with bariatric surgery

## 2022-09-30 NOTE — CASE MANAGEMENT
Case Management Discharge Planning Note    Patient name Pepito Simmons  Location [facility] 902-03 MRN [id number]  : 1945 Date 2022       Current Admission Date: 2022  Current Admission Diagnosis:Sepsis [facility])   Patient Active Problem List    Diagnosis Date Noted   • Hypotension 2022   • Urinary retention 2022   • Transaminitis 2022   • Acute encephalopathy 2022   • Moderate protein-calorie malnutrition ([address] ) 2022   • Bacteremia 2022   • Acute on chronic anemia 2022   • Goals of care, counseling/discussion 2022   • Colitis presumed to be due to infection 2022   • Acute cystitis without hematuria 2022   • Acute kidney insufficiency 2022   • Fall 2022   • Diarrhea 2022   • Sepsis ([address] ) 2022   • Sacral ulcer ([address] ) 2022   • Gram-positive bacteremia 2022   • Severe protein-calorie malnutrition ([address] ) 2022   • Bilateral leg edema 2022   • Ambulatory dysfunction 2022   • Acute blood loss anemia 2022   • NOAH (acute kidney injury) ([address] ) 2022   • Gastric ulcer 2022   • Fever 2022   • Anemia 2022   • Dyspnea on exertion 2022   • Generalized weakness 2022   • Elevated LFTs 2022   • Hyponatremia 2022   • Abnormal CT scan 2022   • H/O bariatric surgery - bypass 2022   • Cerumen debris on tympanic membrane of right ear 2022   • Nasal congestion 2022   • Bilateral hearing loss 2022   • Fibromyalgia syndrome 2021   • Osteopenia of both forearms 2021   • Medicare annual wellness visit, subsequent 2021   • Shortness of breath 2021   • Acute bronchitis 2021   • COVID-19 2021   • Dysphasia 2020   • Epigastric pain 2020   • Hypothyroidism 2020   • Gastroesophageal reflux disease without esophagitis 2020   • Depression 2020   • Fibromyalgia, primary 11/13/2020   • Iron deficiency 11/13/2020   • Numbness of foot 11/13/2020      LOS (days): 30  Geometric Mean LOS (GMLOS) (days): 4 80  Days to GMLOS:-25 4     OBJECTIVE:  Risk of Unplanned Readmission Score: 44 11         Current admission status: Inpatient   Preferred Pharmacy:   [place] [address] [address] [facility], [address] [place] [id number]  Phone: [phone] Fax: [phone] [facility], [address] [place] [address]  Phone: [phone] Fax: [phone]    Primary Care Provider: Valencia Paris MD    Primary Insurance: [facility]  Secondary Insurance:     DISCHARGE DETAILS:    9/29 Not medically cleared  Pt resistant to working with therapy  [address] stated they will re-evaluate her on Monday  Update called to patients liorjuan carlos Pollard Anger [facility])   [phone] (M) CM left VM

## 2022-09-30 NOTE — ASSESSMENT & PLAN NOTE
· Baseline creatinine appears to be 0 9-1 1  · Creatinine was 1 1 on last discharge  · Creatinine has been 1 3s-1 5s  · Nephrology consult appreciated  · AM labs pending - patient agreeable to having them drawn now, RN aware

## 2022-09-30 NOTE — ASSESSMENT & PLAN NOTE
Malnutrition Findings:   Adult Malnutrition type: Chronic illness  Adult Degree of Malnutrition: Other severe protein calorie malnutrition  Malnutrition Characteristics: Weight loss, Muscle loss       360 Statement: Severe/Chronic malnutrition r/t condition, as evidenced by 4 8% wt loss x < 1 week (9/2/22: 125#, 9/5/22: 119#), and severe muscle mass depletion (temples/clavicle)  Treated with liberalized diet and nutrition supplements, possibly nutrition support pending goals of care    BMI Findings: Body mass index is 18 28 kg/m²  · Hx of Savage-en Y gastric bypass, 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  Now off due to recurrent bacteremia  · S/p PEG placement and on PO feeds   Cont nutritional supplements  · Nutrition consulted given hyponatremia as above

## 2022-10-01 NOTE — PLAN OF CARE
Problem: MOBILITY - ADULT  Goal: Maintain or return to baseline ADL function  Description: INTERVENTIONS:  -  Assess patient's ability to carry out ADLs; assess patient's baseline for ADL function and identify physical deficits which impact ability to perform ADLs (bathing, care of mouth/teeth, toileting, grooming, dressing, etc )  - Assess/evaluate cause of self-care deficits   - Assess range of motion  - Assess patient's mobility; develop plan if impaired  - Assess patient's need for assistive devices and provide as appropriate  - Encourage maximum independence but intervene and supervise when necessary  - Involve family in performance of ADLs  - Assess for home care needs following discharge   - Consider OT consult to assist with ADL evaluation and planning for discharge  - Provide patient education as appropriate  Outcome: Progressing     Problem: INFECTION - ADULT  Goal: Absence or prevention of progression during hospitalization  Description: INTERVENTIONS:  - Assess and monitor for signs and symptoms of infection  - Monitor lab/diagnostic results  - Monitor all insertion sites, i e  indwelling lines, tubes, and drains  - Monitor endotracheal if appropriate and nasal secretions for changes in amount and color  - [place] appropriate cooling/warming therapies per order  - Administer medications as ordered  - Instruct and encourage patient and family to use good hand hygiene technique  - Identify and instruct in appropriate isolation precautions for identified infection/condition  Outcome: Progressing  Goal: Absence of fever/infection during neutropenic period  Description: INTERVENTIONS:  - Monitor WBC    Outcome: Progressing     Problem: SAFETY ADULT  Goal: Patient will remain free of falls  Description: INTERVENTIONS:  - Educate patient/family on patient safety including physical limitations  - Instruct patient to call for assistance with activity   - Consult OT/PT to assist with strengthening/mobility   - Keep Call bell within reach  - Keep bed low and locked with side rails adjusted as appropriate  - Keep care items and personal belongings within reach  - Initiate and maintain comfort rounds  - Make Fall Risk Sign visible to staff  - Offer Toileting every 2 Hours, in advance of need  - Initiate/Maintain bed alarm  - Obtain necessary fall risk management equipment: alarms  - Apply yellow socks and bracelet for high fall risk patients  - Consider moving patient to room near nurses station  Outcome: Progressing  Goal: Maintain or return to baseline ADL function  Description: INTERVENTIONS:  -  Assess patient's ability to carry out ADLs; assess patient's baseline for ADL function and identify physical deficits which impact ability to perform ADLs (bathing, care of mouth/teeth, toileting, grooming, dressing, etc )  - Assess/evaluate cause of self-care deficits   - Assess range of motion  - Assess patient's mobility; develop plan if impaired  - Assess patient's need for assistive devices and provide as appropriate  - Encourage maximum independence but intervene and supervise when necessary  - Involve family in performance of ADLs  - Assess for home care needs following discharge   - Consider OT consult to assist with ADL evaluation and planning for discharge  - Provide patient education as appropriate  Outcome: Progressing  Goal: Maintains/Returns to pre admission functional level  Description: INTERVENTIONS:  - Perform BMAT or MOVE assessment daily    - Set and communicate daily mobility goal to care team and patient/family/caregiver  - Collaborate with rehabilitation services on mobility goals if consulted  - Perform Range of Motion 3 times a day  - Reposition patient every 2 hours    - Dangle patient 3 times a day  - Stand patient 3 times a day  - Ambulate patient 3 times a day  - Out of bed to chair 3 times a day   - Out of bed for meals 3 times a day  - Out of bed for toileting  - Record patient progress and toleration of activity level   Outcome: Progressing     Problem: DISCHARGE PLANNING  Goal: Discharge to home or other facility with appropriate resources  Description: INTERVENTIONS:  - Identify barriers to discharge w/patient and caregiver  - Arrange for needed discharge resources and transportation as appropriate  - Identify discharge learning needs (meds, wound care, etc )  - Arrange for interpretive services to assist at discharge as needed  - Refer to Case Management Department for coordinating discharge planning if the patient needs post-hospital services based on physician/advanced practitioner order or complex needs related to functional status, cognitive ability, or social support system  Outcome: Progressing     Problem: Knowledge Deficit  Goal: Patient/family/caregiver demonstrates understanding of disease process, treatment plan, medications, and discharge instructions  Description: Complete learning assessment and assess knowledge base    Interventions:  - Provide teaching at level of understanding  - Provide teaching via preferred learning methods  Outcome: Progressing     Problem: SKIN/TISSUE INTEGRITY - ADULT  Goal: Skin Integrity remains intact(Skin Breakdown Prevention)  Description: Assess:  -Perform Gabe assessment every 24 hr  -Clean and moisturize skin every 12 hr  -Inspect skin when repositioning, toileting, and assisting with ADLS  -Assess extremities for adequate circulation and sensation     Bed Management:  -Have minimal linens on bed & keep smooth, unwrinkled  -Change linens as needed when moist or perspiring  -Avoid sitting or lying in one position for more than 2 hours while in bed  -Keep HOB at 30 degrees     Toileting:  -Offer bedside commode  -Assess for incontinence every hour    Activity:  -Mobilize patient 3 times a day  -Encourage activity and walks on unit  -Encourage or provide ROM exercises   -Turn and reposition patient every 2 Hours  -Use appropriate equipment to lift or move patient in bed  -Instruct/ Assist with weight shifting every hour when out of bed in chair  -Consider limitation of chair time 2 hour intervals    Skin Care:  -Avoid use of baby powder, tape, friction and shearing, hot water or constrictive clothing  -Relieve pressure over bony prominences using cushions/foam wedges  -Do not massage red bony areas    Next Steps:  -Teach patient strategies to minimize risks such as skin breakdown  -Consider consults to  interdisciplinary teams such as  Outcome: Progressing  Goal: Incision(s), wounds(s) or drain site(s) healing without S/S of infection  Description: INTERVENTIONS  - Assess and document dressing, incision, wound bed, drain sites and surrounding tissue  - Provide patient and family education  - Perform skin care/dressing changes every 12 hr  Outcome: Progressing  Goal: Pressure injury heals and does not worsen  Description: Interventions:  - Implement low air loss mattress or specialty surface (Criteria met)  - Apply silicone foam dressing  - Use special pressure reducing interventions such as cushions when in chair   - Apply fecal or urinary incontinence containment device   - Perform passive or active ROM every 4 hr  - Turn and reposition patient & offload bony prominences every 2 hours   - Utilize friction reducing device or surface for transfers   - Consider consults to  interdisciplinary teams such as   - Use incontinent care products after each incontinent episode such as cleanser, cream  - Consider nutrition services referral as needed  Outcome: Progressing     Problem: Prexisting or High Potential for Compromised Skin Integrity  Goal: Skin integrity is maintained or improved  Description: INTERVENTIONS:  - Identify patients at risk for skin breakdown  - Assess and monitor skin integrity  - Assess and monitor nutrition and hydration status  - Monitor labs   - Assess for incontinence   - Turn and reposition patient  - Assist with mobility/ambulation  - Relieve pressure over bony prominences  - Avoid friction and shearing  - Provide appropriate hygiene as needed including keeping skin clean and dry  - Evaluate need for skin moisturizer/barrier cream  - Collaborate with interdisciplinary team   - Patient/family teaching  - Consider wound care consult   Outcome: Progressing     Problem: Nutrition/Hydration-ADULT  Goal: Nutrient/Hydration intake appropriate for improving, restoring or maintaining nutritional needs  Description: Monitor and assess patient's nutrition/hydration status for malnutrition  Collaborate with interdisciplinary team and initiate plan and interventions as ordered  Monitor patient's weight and dietary intake as ordered or per policy  Utilize nutrition screening tool and intervene as necessary  Determine patient's food preferences and provide high-protein, high-caloric foods as appropriate       INTERVENTIONS:  - Monitor oral intake, urinary output, labs, and treatment plans  - Assess nutrition and hydration status and recommend course of action  - Evaluate amount of meals eaten  - Assist patient with eating if necessary   - Allow adequate time for meals  - Recommend/ encourage appropriate diets, oral nutritional supplements, and vitamin/mineral supplements  - Order, calculate, and assess calorie counts as needed  - Recommend, monitor, and adjust tube feedings and TPN/PPN based on assessed needs  - Assess need for intravenous fluids  - Provide specific nutrition/hydration education as appropriate  - Include patient/family/caregiver in decisions related to nutrition  Outcome: Progressing

## 2022-10-01 NOTE — PROGRESS NOTES
NEPHROLOGY PROGRESS NOTE   Tacho Cuello 76 y o  female MRN: [id number]  Unit/Bed#: [facility] 317-01 Encounter: [id number]        ASSESSMENT & PLAN     42-year-old female with a history of bipolar disorder, depression, GERD, iron deficiency who originally presented with diarrhea was treated for presumed line infection possible C diff but had an ESBL UTI which was treated complicated by an elevated creatinine     1  Elevated creatinine-patient has a fluctuating creatinine between 0 8 and 1 2 creatinine has been around 1 3-1 6 this is in the setting of sepsis with a recent urinary tract infection    Patient has good urine output   Systolic blood pressures are low but most over maps are acceptable at this point we can just monitor        2  Hypotension-patient remains on midodrine 10 mg t i d  At this point can continue and monitor if any signs of hypoperfusion can up titrate dose     3  Hyponatremia-this is likely related to poor solute intake with gastric bypass were checking an a m  Cortisol can continue salt tablets and try to keep above 130 encourage solute intake and consult nutrition   Increase salt tabs to 2 g t i d  If tolerating  patient's urine osmolality is high, urine sodium not low  Her T4 is low TSH was high and her levothyroxine dose was adjusted by endocrinology this will likely help the serum sodium as well     4  Acute on chronic anemia-transfuse if less than 7      SUBJECTIVE:    Patient was seen today  Resting comfortably this morning on rounds  Urine output stable  Breathing is stable    12 point review of systems was otherwise negative besides what is mentioned above      Medications:    Current Facility-Administered Medications:   •  acetaminophen (TYLENOL) tablet 975 mg, 975 mg, Oral, Q6H PRN, Leana Koehler PA-C, 975 mg at 09/26/22 2315  •  ALPRAZolam Gabrielle Oakley) tablet 0 25 mg, 0 25 mg, Oral, TID PRN, Yann Thompson DO, 0 25 mg at 09/24/22 1616  •  alteplase (CATHFLO) injection 2 mg, 2 mg, Intracatheter, Once, Larry Sevilla PA-C  •  bisacodyl (DULCOLAX) rectal suppository 10 mg, 10 mg, Rectal, Daily PRN, Adina Barrientos MD  •  bisacodyl (DULCOLAX) rectal suppository 10 mg, 10 mg, Rectal, Once, Luisito Monzon MD  •  clonazePAM (KlonoPIN) tablet 3 mg, 3 mg, Oral, HS, Jovitakash Mcdremott, DO, 3 mg at 41/90/59 5189  •  folic acid (FOLVITE) tablet 1 mg, 1 mg, Oral, Daily, Jovita Mcdermott, DO, 1 mg at 10/01/22 0062  •  guaiFENesin (MUCINEX) 12 hr tablet 600 mg, 600 mg, Oral, Q12H [address], Pipo Clayton PA-C, 600 mg at 10/01/22 6835  •  heparin (porcine) subcutaneous injection 5,000 Units, 5,000 Units, Subcutaneous, Q8H [address], Timmy Garvin DO, 5,000 Units at 10/01/22 0542  •  levothyroxine tablet 112 mcg, 112 mcg, Oral, Early Morning, Jovita Mcdermott DO, 112 mcg at 09/27/22 0725  •  magnesium hydroxide (MILK OF MAGNESIA) oral suspension 30 mL, 30 mL, Oral, Daily PRN, Adina Barrientos MD, 30 mL at 09/15/22 1421  •  metoclopramide (REGLAN) injection 10 mg, 10 mg, Intravenous, Q6H PRN, Adina Barrientos MD  •  midodrine (PROAMATINE) tablet 10 mg, 10 mg, Oral, TID AC, TOLU Orellana, 10 mg at 10/01/22 0755  •  ondansetron (ZOFRAN) injection 4 mg, 4 mg, Intravenous, Q6H PRN, Jovita Mcdermott DO, 4 mg at 09/18/22 1339  •  oxyCODONE (ROXICODONE) IR tablet 5 mg, 5 mg, Oral, Q6H, Adina Barrientos MD, 5 mg at 10/01/22 0533  •  pantoprazole (PROTONIX) EC tablet 40 mg, 40 mg, Oral, BID AC, Kaylan Arroyo DO, 40 mg at 10/01/22 8095  •  polyethylene glycol (MIRALAX) packet 17 g, 17 g, Per PEG Tube, BID, Jovita Mcdermott DO, 17 g at 09/24/22 2114  •  senna (SENOKOT) tablet 17 2 mg, 2 tablet, Oral, HS, Hector Rao MD, 17 2 mg at 09/30/22 2139  •  sodium chloride tablet 1 g, 1 g, Oral, TID, Dylon Landis DO, 1 g at 10/01/22 0930  •  thiamine tablet 100 mg, 100 mg, Oral, Daily, Jovita Mcdermott DO, 100 mg at 10/01/22 1378  •  traZODone (DESYREL) tablet 150 mg, 150 mg, Oral, HS, Jovita Mcdermott DO, 150 mg at 09/30/22 2138  • vancomycin (VANCOCIN) oral solution 125 mg, 125 mg, Oral, Q12H [facility], Georgia Bran MD, 125 mg at 10/01/22 0929    OBJECTIVE:    Vitals:    09/30/22 0840 09/30/22 1539 09/30/22 2300 10/01/22 0800   BP: 106/53 (!) 103/44 104/53 112/51   BP Location: Left arm Right arm Right arm Left arm   Pulse: 84 67 83 91   Resp: 14 14 16 20   Temp: 97 9 °F (36 6 °C) 98 4 °F (36 9 °C) 98 9 °F (37 2 °C) 99 5 °F (37 5 °C)   TempSrc: Oral Oral Oral Oral   SpO2: 93%  93% 92%   Weight:       Height:            Temp:  [98 4 °F (36 9 °C)-99 5 °F (37 5 °C)] 99 5 °F (37 5 °C)  HR:  [67-91] 91  Resp:  [14-20] 20  BP: (103-112)/(44-53) 112/51  SpO2:  [92 %-93 %] 92 %     Body mass index is 18 28 kg/m²  Weight (last 2 days)     None          I/O last 3 completed shifts: In: 120 [P O :120]  Out: 900 [Urine:900]    No intake/output data recorded        Physical exam:    General:  Frail and cachectic  Eyes: conjunctivae pink, anicteric sclerae  ENT: lips and mucous membranes moist, no exudates, normal external ears  Neck: ROM intact, no JVD  Chest: No respiratory distress, no accessory muscle use  CVS: normal rate, non pericardial friction rub  Abdomen: soft, non-tender, non-distended, normoactive bowel sounds  Extremities:  Positive lower extremity edema  Skin: no rash  Neuro:  Resting comfortably    Invasive Devices:      Lab Results:   Results from last 7 days   Lab Units 10/01/22  0750 10/01/22  0554 09/30/22  1657 09/30/22  1357 09/29/22  1139 09/28/22  1512 09/28/22  1043 09/27/22  0909 09/26/22  1221 09/26/22  1025   WBC Thousand/uL  --  13 50* 15 34*  --  13 08* 13 42*  --  11 31*  --  14 50*   HEMOGLOBIN g/dL  --  7 9* 7 8*  --  7 5* 7 8*  --  8 0*  --  8 0*   HEMATOCRIT %  --  25 5* 25 2*  --  23 8* 25 1*  --  25 6*  --  26 2*   PLATELETS Thousands/uL  --  572* 576*  --  516* 492*  --  419*  --  483*   POTASSIUM mmol/L  --  4 3  --  4 6 4 5 4 7 5 7* 4 9  --  5 2   CHLORIDE mmol/L  --  103  --  104 103 102 102 100  --  101   CO2 mmol/L  --  22  --  19* 23 24 24 25  --  24   BUN mg/dL  --  20  --  19 18 23 23 25  --  24   CREATININE mg/dL  --  1 58*  --  1 61* 1 55* 1 57* 1 49* 1 36*  --  1 30   CALCIUM mg/dL  --  7 9*  --  8 1* 8 2* 8 1* 8 8 8 4  --  7 7*   ALK PHOS U/L  --   --   --   --   --  144* 156*  --   --  206*   ALT U/L  --   --   --   --   --  42 47  --   --  83*   AST U/L  --   --   --   --   --  30 52*  --   --  129*   BLOOD CULTURE   --   --   --   --   --   --   --   --  No Growth After 4 Days  No Growth After 4 Days  --    LEUKOCYTES UA  Negative  --   --   --   --   --   --   --   --   --    BLOOD UA  Negative  --   --   --   --   --   --   --   --   --          Portions of the record may have been created with voice recognition software  Occasional wrong word or "sound a like" substitutions may have occurred due to the inherent limitations of voice recognition software  Read the chart carefully and recognize, using context, where substitutions have occurred  If you have any questions, please contact the dictating provider

## 2022-10-01 NOTE — PROGRESS NOTES
[facility]  Progress Note - Andree Roberts 1945, 76 y o  female MRN: [id number]  Unit/Bed#: [facility] 317-01 Encounter: [id number]  Primary Care Provider: Shelley Hannah MD   Date and time admitted to hospital: 8/30/2022 11:49 PM    * Sepsis [facility])  Assessment & Plan  · Recurrent sepsis  Presented with fever, tachypnea, tachycardia leukocytosis  Patient presented with PICC line which was in place for TPN, patient was treated for presumed line infection  Repeat blood cultures negative  Was also treated for possible C diff infection and remains on prophylactic vancomycin  Doppler with right forearm superficial thrombophlebitis  Had isolated fever 9/23 therefore ID was reconsulted, patient was having reports of dysuria  · ID following, completed intravenous ertapenem for ESBL UTI 9/29 for a total of 3 doses  · Repeat CT chest abdomen pelvis 9/27-for acute infectious etiology, incidental findings as below  · Repeat blood cultures no growth to date  · COVID-19 PCR negative  · Trend temps and leukocytosis  Has been afebrile since 09/26  H/O bariatric surgery - bypass  Assessment & Plan  Patient with history of gastric bypass surgery  Has had significant malnutrition issues and anastomotic ulcerations  Seen by Bariatric Service during recent hospitalization at [facility] who recommended prolonged course of TPN for nutritional optimization and possible revision of surgery in the future  · Was planned to have appt with bariatric sx on 8/20 however had to be rescheduled  · Given recurrent bacteremia; s/p GI consult; recommended discontinuation of TPN  S/p PEG, TF initiated- nutrition adjusted rate of TF  · P O  Diet for pleasure feeding resumed  · To f/u with bariatric surgery      Diarrhea  Assessment & Plan  · Recent PCR test negative  Had recent C diff infection    Continue C diff prophylaxis  for 72 hours after completion of IV abx    Urinary retention  Assessment & Plan  · Has required intermittent catheterizations however now voiding spontaneously  · Continue retention protocol    Hypotension  Assessment & Plan  · Continue midodrine  · With NOAH  · Nephrology following    Transaminitis  Assessment & Plan  · NO RUQ pain, USG with gallstones, no choelcystitis  · Will trend    Acute encephalopathy  Assessment & Plan  · Patient waxes and wanes  · ? 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  · S/p recent EGD in 7/22 revealing: Residual marginal ulcer of the gastrojejunostomy anastomosis with improved size  · S/p EGD 9/2 revealing: Large, cratered ulcer in the gastrojejunal anastomosis   · Received PRBC transfusion x 1 this admission,  continue monitoring transfuse less than 7  · Cont PPI, BID, carafate    Bacteremia  Assessment & Plan  · Recent hx of staph epi bacteremia in 7/2022, presumed due to picc line  · Recurrence 1/2 set staph epi, 2nd set without growth  Was treated with intravenous vancomycin for presumed line infection  · S/p TAVIA (9/9) no evidence of vegetation  · Blood cultures had been negative however repeated again due to isolated fever which are negative to date  · PICC and TPN discontinued, now has PEG tube        Moderate protein-calorie malnutrition ([facility] [address] )  Assessment & Plan  Malnutrition Findings:   Adult Malnutrition type: Chronic illness  Adult Degree of Malnutrition: Other severe protein calorie malnutrition  Malnutrition Characteristics: Weight loss, Muscle loss       360 Statement: Severe/Chronic malnutrition r/t condition, as evidenced by 4 8% wt loss x < 1 week (9/2/22: 125#, 9/5/22: 119#), and severe muscle mass depletion (temples/clavicle)  Treated with liberalized diet and nutrition supplements, possibly nutrition support pending goals of care    BMI Findings: Body mass index is 18 28 kg/m²  · Hx of Savage-en Y gastric bypass, 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  Now off due to recurrent bacteremia  · S/p PEG placement and on PO feeds  Cont nutritional supplements  · Nutrition consulted given hyponatremia as above    Ambulatory dysfunction  Assessment & Plan  · Exacerbated by diarrhea/dehydration however patient is chronically malnourished  · S/p PT/OT evaluation  · CM following for safe discharge planning - patient was resistant to working with therapy per CM notes and facility to re-evaluate Monday per        NOAH (acute kidney injury) ([facility] [address] )  Assessment & Plan  · Baseline creatinine appears to be 0 9-1 1  · Creatinine was 1 1 on last discharge  · Creatinine has been 1 3s-1 5s  · Nephrology consult appreciated  · AM labs pending - patient agreeable to having them drawn now, RN aware     Abnormal CT scan  Assessment & Plan  Noted on CT C/A/P 9/26  · Focal thickening of the lower portion of the endometrium  While this is not expected in a patient of this age, this appears stable from 2016 suggesting a benign etiology  If there is clinical concern, follow-up ultrasound is recommended  · Small left upper lobe nodules, one of which is new from March 2022  Given the history of smoking, a follow-up chest CT is recommended in 12 months      · This was d/w sister and brother POA over phone by prior provider 9/28    Hyponatremia  Assessment & Plan  · Was started on Na Cl tablets this admission and had persistent hyponatremia  · Nephrology consulted - salt tabs were increased  · Nutrition consulted for further recs regarding tube feeds  · AM labs pending       Hypothyroidism  Assessment & Plan  · TSH elevated and fT4 low  · Endocrinology consulted        VTE Pharmacologic Prophylaxis: VTE Score: 3 Moderate Risk (Score 3-4) - Pharmacological DVT Prophylaxis Ordered: heparin  Patient Centered Rounds: I performed bedside rounds with nursing staff today  Discussions with Specialists or Other Care Team Provider: KEITH      Education and Discussions with Family / Patient: Patient declined call to   Time Spent for Care: 30 minutes  More than 50% of total time spent on counseling and coordination of care as described above  Current Length of Stay: 31 day(s)  Current Patient Status: Inpatient   Certification Statement: The patient will continue to require additional inpatient hospital stay due to discharge planning, rehab placement  Discharge Plan: Anticipate discharge in >72 hrs to rehab facility  Code Status: Level 3 - DNAR and DNI    Subjective:   Patient seen and examined  Following up for malnutrition, failure to thrive, numerous issues as listed above  Patient drowsy but arousable  He voiced no complaints  Fell back asleep quick way  Ill-appearing but nontoxic  Malnourished  Objective:     Vitals:   Temp (24hrs), Av 4 °F (37 4 °C), Min:98 9 °F (37 2 °C), Max:99 9 °F (37 7 °C)    Temp:  [98 9 °F (37 2 °C)-99 9 °F (37 7 °C)] 99 9 °F (37 7 °C)  HR:  [83-94] 94  Resp:  [16-20] 20  BP: (104-112)/(51-54) 112/54  SpO2:  [92 %-93 %] 92 %  Body mass index is 18 28 kg/m²  Input and Output Summary (last 24 hours): Intake/Output Summary (Last 24 hours) at 10/1/2022 1653  Last data filed at 10/1/2022 1000  Gross per 24 hour   Intake 1090 ml   Output 900 ml   Net 190 ml       PHYSICAL EXAM:    Vitals signs reviewed  Constitutional   Drowsy but arousable  NAD  Chronically ill-appearing  Malnourished  Head/Neck   Normocephalic  Atraumatic     HEENT No scleral icterus  EOMI  Heart   Regular rate and rhythm  No murmers  Lungs   Clear to auscultation bilaterally  Respirations unlaboured  Abdomen   Soft  Nontender  Nondistended  Skin   Skin color pale  No rashes  Extremities   No deformities  No peripheral edema  Neuro   Drowsy but arousable  No new deficits  Psych   Mood stable  Affect normal          Additional Data:     Labs:  Results from last 7 days   Lab Units 10/01/22  0554 09/30/22  1657 09/29/22  1139   WBC Thousand/uL 13 50*   < > 13 08*   HEMOGLOBIN g/dL 7 9*   < > 7 5*   HEMATOCRIT % 25 5*   < > 23 8*   PLATELETS Thousands/uL 572*   < > 516*   NEUTROS PCT %  --   --  71   LYMPHS PCT %  --   --  11*   MONOS PCT %  --   --  7   EOS PCT %  --   --  10*    < > = values in this interval not displayed  Results from last 7 days   Lab Units 10/01/22  0554 09/29/22  1139 09/28/22  1512   SODIUM mmol/L 132*   < > 132*   POTASSIUM mmol/L 4 3   < > 4 7   CHLORIDE mmol/L 103   < > 102   CO2 mmol/L 22   < > 24   BUN mg/dL 20   < > 23   CREATININE mg/dL 1 58*   < > 1 57*   ANION GAP mmol/L 7   < > 6   CALCIUM mg/dL 7 9*   < > 8 1*   ALBUMIN g/dL  --   --  1 5*   TOTAL BILIRUBIN mg/dL  --   --  0 29   ALK PHOS U/L  --   --  144*   ALT U/L  --   --  42   AST U/L  --   --  30   GLUCOSE RANDOM mg/dL 126   < > 134    < > = values in this interval not displayed  Results from last 7 days   Lab Units 10/01/22  1637 10/01/22  1111 10/01/22  0551 10/01/22  0120 09/30/22  1714 09/29/22  1752 09/29/22  1213 09/29/22  0606 09/28/22  2315 09/28/22  1245 09/27/22  1303 09/27/22  0610   POC GLUCOSE mg/dl 95 124 136 140 93 91 106 95 96 98 100 113               Lines/Drains:  Invasive Devices  Report    Drain  Duration           Gastrostomy/Enterostomy Percutaneous Endoscopic Gastrostomy (PEG) 20 Fr  LUQ 23 days                      Imaging: No pertinent imaging reviewed      Recent Cultures (last 7 days):   Results from last 7 days   Lab Units 09/26/22  1221 09/25/22  1642   BLOOD CULTURE  No Growth After 5 Days  No Growth After 5 Days  --    C DIFF TOXIN B BY PCR   --  Negative       Last 24 Hours Medication List:   Current Facility-Administered Medications   Medication Dose Route Frequency Provider Last Rate   • acetaminophen  975 mg Oral Q6H PRN Cecilio Martinez PA-C     • ALPRAZolam  0 25 mg Oral TID PRN Tommy Shawa, DO     • alteplase  2 mg Intracatheter Once Marlin Vicente PA-C     • bisacodyl  10 mg Rectal Daily PRN Melissa Olvera MD     • bisacodyl  10 mg Rectal Once Natividad Hsu MD     • clonazePAM  3 mg Oral HS Jovita Mcdermott, DO     • folic acid  1 mg Oral Daily Jovita Mcdermott, DO     • guaiFENesin  600 mg Oral Q12H [address] Cecilio Martinez PA-C     • heparin (porcine)  5,000 Units Subcutaneous Q8H [address] Tommy Juarez, DO     • levothyroxine  112 mcg Oral Early Morning Jovita Mcdermott, DO     • magnesium hydroxide  30 mL Oral Daily PRN Melissa Olvera MD     • metoclopramide  10 mg Intravenous Q6H PRN Melissa Olvera MD     • midodrine  10 mg Oral TID AC TOLU Orellana     • ondansetron  4 mg Intravenous Q6H PRN Luigi May DO     • oxyCODONE  5 mg Oral Q6H Melissa Olvera MD     • pantoprazole  40 mg Oral BID AC Kaylan Arroyo DO     • polyethylene glycol  17 g Per PEG Tube BID Luigi May DO     • senna  2 tablet Oral HS Shy Cleary MD     • sodium chloride  2 g Oral TID Gabe Ross DO     • thiamine  100 mg Oral Daily Jovita Mcdermott, DO     • traZODone  150 mg Oral HS Jovita Mcdermott, DO     • vancomycin  125 mg Oral Q12H Kristian Davison MD          Today, Patient Was Seen By: Luis Hughes    **Please Note: This note may have been constructed using a voice recognition system  **

## 2022-10-01 NOTE — ASSESSMENT & PLAN NOTE
Patient with history of gastric bypass surgery  Has had significant malnutrition issues and anastomotic ulcerations  Seen by Bariatric Service during recent hospitalization at [facility] who recommended prolonged course of TPN for nutritional optimization and possible revision of surgery in the future  · Was planned to have appt with bariatric sx on 8/20 however had to be rescheduled  · Given recurrent bacteremia; s/p GI consult; recommended discontinuation of TPN  S/p PEG, TF initiated- nutrition adjusted rate of TF  · P O   Diet for pleasure feeding resumed  · To f/u with bariatric surgery

## 2022-10-02 PROBLEM — R74.01 TRANSAMINITIS: Status: RESOLVED | Noted: 2022-09-19 | Resolved: 2022-10-02

## 2022-10-02 NOTE — PROGRESS NOTES
[address] [facility]  Progress Note - Ryann Hargrove 1945, 76 y o  female MRN: [id number]  Unit/Bed#: [facility] 317-01 Encounter: [id number]  Primary Care Provider: Stiven Trejo MD   Date and time admitted to hospital: 8/30/2022 11:49 PM    * Sepsis [facility])  Assessment & Plan  - initially met criteria with fever, tachypnea, tachycardia, and leukocytosis on admission  Sepsis secondary to recurrent bacteremia and PICC line infections  - PICC line has been removed and patient had PEG tube placed for nutritional support  - finished full course of ertapenem for ESBL UTI    - sepsis resolved    H/O bariatric surgery - bypass  Assessment & Plan  - history of Savage-en-Y gastric bypass  Has had significant issues over the last 6 months or so with chronic malnutrition and an anastomotic ulcerations  When last seen by bariatric surgery, it was recommended to continue on prolonged TPN for nutritional optimization with possible revision of the Savage-en-Y in the future   - given her recurrence PICC line infections and bacteremia, TPN has been discontinued  - continue tube feeds for nutritional optimization  - will need significant improvement before consideration can be given to further surgical intervention      Diarrhea  Assessment & Plan  - recent history of C diff colitis  - treated with prophylactic dose of p o  Vancomycin while on antibiotics     Urinary retention  Assessment & Plan  - Has required intermittent catheterizations however now voiding spontaneously  - Continue retention protocol    Hypotension  Assessment & Plan  - continue midodrine 10 mg t i d     Acute encephalopathy  Assessment & Plan  - patient has waxing waning mental status though no current insult triggering encephalopathy  - cognition and mental status issues multifactorial in nature stemming from prolonged illness, numerous hospitalizations, chronic malnutrition, and chronic/recurrent infections    - monitor clinically  - delirium precautions      Goals of care, counseling/discussion  Assessment & Plan  - Evaluated by palliative care  Wants to continue medical directed treatments with limits of DNR/DNI   - Continue current pain management regimen with scheduled oxycodone and additional p r n  Doses  Should follow-up with palliative Care as outpatient  Acute on chronic anemia  Assessment & Plan  - 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  - S/p recent EGD in 7/22 revealing: Residual marginal ulcer of the gastrojejunostomy anastomosis with improved size  - S/p EGD 9/2 revealing: Large, cratered ulcer in the gastrojejunal anastomosis     - status post 1 unit PRBCs this admission  - continue PPI b i d  Bacteremia  Assessment & Plan  - Recent hx of staph epi bacteremia in 7/2022, presumed due to picc line  - Recurrence 1/2 set staph epi, 2nd set without growth  Was treated with intravenous vancomycin for presumed line infection   - S/p TAVIA (9/9) no evidence of vegetation  - Blood cultures had been negative however repeated again due to isolated fever which are negative to date  - PICC and TPN discontinued, now has PEG tube  Monitor off abx's    Moderate protein-calorie malnutrition ([address] )  Assessment & Plan  Malnutrition Findings:   Adult Malnutrition type: Chronic illness  Adult Degree of Malnutrition: Other severe protein calorie malnutrition  Malnutrition Characteristics: Weight loss, Muscle loss       360 Statement: Severe/Chronic malnutrition r/t condition, as evidenced by 4 8% wt loss x < 1 week (9/2/22: 125#, 9/5/22: 119#), and severe muscle mass depletion (temples/clavicle)  Treated with liberalized diet and nutrition supplements, possibly nutrition support pending goals of care    BMI Findings: Body mass index is 18 28 kg/m²     · Continue tube feeds    Ambulatory dysfunction  Assessment & Plan  - will need rehab on discharge      NOAH (acute kidney injury) ([facility] [address] )  Assessment & Plan  - renal function appears to have stabilized around creatinine of 1 5  - likely new baseline  - monitor urine output    Abnormal CT scan  Assessment & Plan  Noted on CT C/A/P   - Focal thickening of the lower portion of the endometrium  While this is not expected in a patient of this age, this appears stable from  suggesting a benign etiology  If there is clinical concern, follow-up ultrasound is recommended  - Small left upper lobe nodules, one of which is new from 2022  Given the history of smoking, a follow-up chest CT is recommended in 12 months  - This was d/w sister and brother POA over phone by prior provider     Hyponatremia  Assessment & Plan  - stable in the low 30s  - continue sodium chloride tabs 2 g q 8        VTE Pharmacologic Prophylaxis: VTE Score: 3 Moderate Risk (Score 3-4) - Pharmacological DVT Prophylaxis Ordered: heparin  Patient Centered Rounds: I performed bedside rounds with nursing staff today  Discussions with Specialists or Other Care Team Provider: KEITH      Education and Discussions with Family / Patient: Patient declined call to   Time Spent for Care: 30 minutes  More than 50% of total time spent on counseling and coordination of care as described above  Current Length of Stay: 32 day(s)  Current Patient Status: Inpatient   Certification Statement: The patient will continue to require additional inpatient hospital stay due to discharge planning, rehab placement  Discharge Plan: Anticipate discharge in >72 hrs to rehab facility  Code Status: Level 3 - DNAR and DNI    Subjective:   Patient seen and examined  Following up for malnutrition, failure to thrive, numerous issues as listed above  Patient resting comfortably in bed  Chronically ill-appearing  Voiced no complaints this morning  Just generally tired, deconditioned, and fatigue      Objective:     Vitals:   Temp (24hrs), Av 7 °F (37 1 °C), Min:97 5 °F (36 4 °C), Max:99 9 °F (37 7 °C)    Temp:  [97 5 °F (36 4 °C)-99 9 °F (37 7 °C)] 97 5 °F (36 4 °C)  HR:  [91-94] 91  Resp:  [20] 20  BP: ()/(54) 92/54  SpO2:  [90 %] 90 %  Body mass index is 18 28 kg/m²  Input and Output Summary (last 24 hours): Intake/Output Summary (Last 24 hours) at 10/2/2022 1452  Last data filed at 10/1/2022 2140  Gross per 24 hour   Intake --   Output 1000 ml   Net -1000 ml       PHYSICAL EXAM:    Vitals signs reviewed  Constitutional  NAD  Chronically ill-appearing  Malnourished  Head/Neck   Normocephalic  Atraumatic  HEENT   No scleral icterus  EOMI  Heart   Regular rate and rhythm  No murmers  Lungs   Clear to auscultation bilaterally  Respirations unlaboured  Abdomen   Soft  Nontender  Nondistended  Skin   Skin color pale  No rashes  Extremities   No deformities  No peripheral edema  Neuro   Drowsy but arousable  No new deficits  Psych   Mood stable  Affect normal          Additional Data:     Labs:  Results from last 7 days   Lab Units 10/01/22  0554 09/30/22  1657 09/29/22  1139   WBC Thousand/uL 13 50*   < > 13 08*   HEMOGLOBIN g/dL 7 9*   < > 7 5*   HEMATOCRIT % 25 5*   < > 23 8*   PLATELETS Thousands/uL 572*   < > 516*   NEUTROS PCT %  --   --  71   LYMPHS PCT %  --   --  11*   MONOS PCT %  --   --  7   EOS PCT %  --   --  10*    < > = values in this interval not displayed  Results from last 7 days   Lab Units 10/01/22  0554 09/29/22  1139 09/28/22  1512   SODIUM mmol/L 132*   < > 132*   POTASSIUM mmol/L 4 3   < > 4 7   CHLORIDE mmol/L 103   < > 102   CO2 mmol/L 22   < > 24   BUN mg/dL 20   < > 23   CREATININE mg/dL 1 58*   < > 1 57*   ANION GAP mmol/L 7   < > 6   CALCIUM mg/dL 7 9*   < > 8 1*   ALBUMIN g/dL  --   --  1 5*   TOTAL BILIRUBIN mg/dL  --   --  0 29   ALK PHOS U/L  --   --  144*   ALT U/L  --   --  42   AST U/L  --   --  30   GLUCOSE RANDOM mg/dL 126   < > 134    < > = values in this interval not displayed           Results from last 7 days Lab Units 10/02/22  0613 10/01/22  1637 10/01/22  1111 10/01/22  0551 10/01/22  0120 09/30/22  1714 09/29/22  1752 09/29/22  1213 09/29/22  0606 09/28/22  2315 09/28/22  1245 09/27/22  1303   POC GLUCOSE mg/dl 126 95 124 136 140 93 91 106 95 96 98 100               Lines/Drains:  Invasive Devices  Report    Drain  Duration           Gastrostomy/Enterostomy Percutaneous Endoscopic Gastrostomy (PEG) 20 Fr  LUQ 23 days                      Imaging: No pertinent imaging reviewed  Recent Cultures (last 7 days):   Results from last 7 days   Lab Units 09/26/22  1221 09/25/22  1642   BLOOD CULTURE  No Growth After 5 Days  No Growth After 5 Days    --    C DIFF TOXIN B BY PCR   --  Negative       Last 24 Hours Medication List:   Current Facility-Administered Medications   Medication Dose Route Frequency Provider Last Rate   • acetaminophen  975 mg Oral Q6H PRN Abril Tomas PABrianC     • ALPRAZolam  0 25 mg Oral TID PRN Anastacia Asif DO     • alteplase  2 mg Intracatheter Once Reliant Di PA-C     • bisacodyl  10 mg Rectal Daily PRN Jordan Young MD     • bisacodyl  10 mg Rectal Once Melonie Dalton MD     • clonazePAM  3 mg Oral HS Jovita Mcdermott, DO     • folic acid  1 mg Oral Daily Jovita Mcdermott, DO     • guaiFENesin  600 mg Oral Q12H [address] Abrilbethanie Tomas PA-C     • heparin (porcine)  5,000 Units Subcutaneous Q8H [address] Anastacia Asif DO     • levothyroxine  112 mcg Oral Early Morning Jovita Baldemar, DO     • magnesium hydroxide  30 mL Oral Daily PRN Jordan Young MD     • metoclopramide  10 mg Intravenous Q6H PRN Jordan Young MD     • midodrine  10 mg Oral TID AC TOLU Orellana     • ondansetron  4 mg Intravenous Q6H PRN Madelin Gordon DO     • oxyCODONE  5 mg Oral Q6H Jordan Young MD     • pantoprazole  40 mg Oral BID AC Kaylan Arroyo DO     • polyethylene glycol  17 g Per PEG Tube BID Madelin Gordon DO     • senna  2 tablet Oral HS Marie Alegre MD     • sodium chloride  2 g Oral TID Palma Reyes, DO     • thiamine  100 mg Oral Daily Jovita Mcdermott, DO     • traZODone  150 mg Oral HS Jovita Mcdermott, DO     • vancomycin  125 mg Oral Q12H [address] Stewart Carter MD          Today, Patient Was Seen By: Celia Hughes    **Please Note: This note may have been constructed using a voice recognition system  **

## 2022-10-02 NOTE — ASSESSMENT & PLAN NOTE
· Stable in the low 30s on last check  · Nephrology following   · Continue sodium chloride tabs 2 g q 8

## 2022-10-02 NOTE — ASSESSMENT & PLAN NOTE
· as required intermittent catheterizations however now voiding spontaneously    · Continue retention protocol

## 2022-10-02 NOTE — PROGRESS NOTES
NEPHROLOGY PROGRESS NOTE   José Galo 76 y o  female MRN: [id number]  Unit/Bed#: [facility] 317-01 Encounter: [id number]        ASSESSMENT & PLAN     70-year-old female with a history of bipolar disorder, depression, GERD, iron deficiency who originally presented with sepsis, diarrhea, was treated for presumed line infection possible C diff and also had an ESBL UTI which was treated complicated by an elevated creatinine     1  Elevated creatinine-patient has a fluctuating creatinine between 0 8 and 1 2 creatinine has been around 1 3-1 6 this is in the setting of sepsis with a recent urinary tract infection    Patient has good urine output   Systolic blood pressures are low but most over maps are acceptable at this point we can just monitor        2  Hypotension-patient remains on midodrine 10 mg t i d  At this point can continue and monitor if any signs of hypoperfusion can up titrate dose  Will check an a m  Cortisol     3  Hyponatremia-this is likely related to poor solute intake with gastric bypass were checking an a m  Cortisol can continue salt tablets and try to keep above 130 encourage solute intake and consult nutrition   Increase salt tabs to 2 g t i d  If tolerating    patient's urine osmolality is high, urine sodium not low  Her T4 is low TSH was high and her levothyroxine dose was adjusted by endocrinology this will likely help the serum sodium as well  Patient with no labs today  Could completely take out free water flushes from tube feeds as she seems to be hydrating well and a to keep sodium level stable  Could also put on a fluid restriction although not sure how much patient is taking in at this point  Can adjust once repeat blood work has been completed     4   Acute on chronic anemia-transfuse if less than 7      SUBJECTIVE:    Patient was seen this morning on rounds  She is resting comfortably  Her tube feeds are running without difficulty    12 point review of systems was otherwise negative besides what is mentioned above      Medications:    Current Facility-Administered Medications:   •  acetaminophen (TYLENOL) tablet 975 mg, 975 mg, Oral, Q6H PRN, Neo Neely PA-C, 975 mg at 09/26/22 2315  •  ALPRAZolam Aguilar Silas) tablet 0 25 mg, 0 25 mg, Oral, TID PRN, Valorie Brice, DO, 0 25 mg at 09/24/22 1616  •  alteplase (CATHFLO) injection 2 mg, 2 mg, Intracatheter, Once, Larry Sevilla PA-C  •  bisacodyl (DULCOLAX) rectal suppository 10 mg, 10 mg, Rectal, Daily PRN, Morena Presley MD  •  bisacodyl (DULCOLAX) rectal suppository 10 mg, 10 mg, Rectal, Once, Willis Alejo MD  •  clonazePAM (KlonoPIN) tablet 3 mg, 3 mg, Oral, HS, Jovita Mcdermott, DO, 3 mg at 85/18/72 0524  •  folic acid (FOLVITE) tablet 1 mg, 1 mg, Oral, Daily, Jovita Mcdermott, DO, 1 mg at 10/01/22 3755  •  guaiFENesin (MUCINEX) 12 hr tablet 600 mg, 600 mg, Oral, Q12H [address], Neo Neely PA-C, 600 mg at 10/01/22 2140  •  heparin (porcine) subcutaneous injection 5,000 Units, 5,000 Units, Subcutaneous, Q8H [address], Valorie Brice, DO, 5,000 Units at 10/02/22 9010  •  levothyroxine tablet 112 mcg, 112 mcg, Oral, Early Morning, Jovita Mcdermott, DO, 112 mcg at 10/01/22 1350  •  magnesium hydroxide (MILK OF MAGNESIA) oral suspension 30 mL, 30 mL, Oral, Daily PRN, Morena Presley MD, 30 mL at 09/15/22 1421  •  metoclopramide (REGLAN) injection 10 mg, 10 mg, Intravenous, Q6H PRN, Morena Presley MD  •  midodrine (PROAMATINE) tablet 10 mg, 10 mg, Oral, TID ACJud CRNP, 10 mg at 10/01/22 1245  •  ondansetron (ZOFRAN) injection 4 mg, 4 mg, Intravenous, Q6H PRN, Jovita Mcdermott DO, 4 mg at 09/18/22 1339  •  oxyCODONE (ROXICODONE) IR tablet 5 mg, 5 mg, Oral, Q6H, Morena Presley MD, 5 mg at 10/02/22 0520  •  pantoprazole (PROTONIX) EC tablet 40 mg, 40 mg, Oral, BID AC, Kaylan Arroyo DO, 40 mg at 10/01/22 0618  •  polyethylene glycol (MIRALAX) packet 17 g, 17 g, Per PEG Tube, BID, Jovita Mcdermott DO, 17 g at 10/01/22 2024  • senna (SENOKOT) tablet 17 2 mg, 2 tablet, Oral, HS, Kareem Junior MD, 17 2 mg at 10/01/22 2140  •  sodium chloride tablet 2 g, 2 g, Oral, TID, Cyndee Krause DO, 2 g at 10/01/22 2140  •  thiamine tablet 100 mg, 100 mg, Oral, Daily, Jovita Mcdermott DO, 100 mg at 10/01/22 8158  •  traZODone (DESYREL) tablet 150 mg, 150 mg, Oral, HS, Jovita Mcdermott DO, 150 mg at 10/01/22 2140  •  vancomycin (VANCOCIN) oral solution 125 mg, 125 mg, Oral, Q12H [address], Guzman Cruz MD, 125 mg at 10/01/22 2140    OBJECTIVE:    Vitals:    09/30/22 2300 10/01/22 0800 10/01/22 1518 10/01/22 2300   BP: 104/53 112/51 112/54 92/54   BP Location: Right arm Left arm Right arm Right arm   Pulse: 83 91 94 91   Resp: 16 20 20 20   Temp: 98 9 °F (37 2 °C) 99 5 °F (37 5 °C) 99 9 °F (37 7 °C) 97 5 °F (36 4 °C)   TempSrc: Oral Oral Oral Axillary   SpO2: 93% 92%  90%   Weight:       Height:            Temp:  [97 5 °F (36 4 °C)-99 9 °F (37 7 °C)] 97 5 °F (36 4 °C)  HR:  [91-94] 91  Resp:  [20] 20  BP: ()/(54) 92/54  SpO2:  [90 %] 90 %     Body mass index is 18 28 kg/m²  Weight (last 2 days)     None          I/O last 3 completed shifts: In: 1090 [NG/GT:50; Feedings:1040]  Out: 1000 [Urine:1000]    No intake/output data recorded        Physical exam:    General:  Frail and cachectic  Eyes: conjunctivae pink, anicteric sclerae  ENT: lips and mucous membranes moist, no exudates, normal external ears  Neck: ROM intact, no JVD  Chest: No respiratory distress, no accessory muscle use  CVS: normal rate, non pericardial friction rub  Abdomen: soft, non-tender, non-distended, normoactive bowel sounds  Extremities:  Positive edema  Skin: no rash  Neuro:  Resting comfortably    Invasive Devices:      Lab Results:   Results from last 7 days   Lab Units 10/01/22  0750 10/01/22  0554 09/30/22  1657 09/30/22  1357 09/29/22  1139 09/28/22  1512 09/28/22  1043 09/27/22  0909 09/26/22  1221 09/26/22  1025   WBC Thousand/uL  --  13 50* 15 34*  --  13 08* 13 42*  -- 11 31*  --  14 50*   HEMOGLOBIN g/dL  --  7 9* 7 8*  --  7 5* 7 8*  --  8 0*  --  8 0*   HEMATOCRIT %  --  25 5* 25 2*  --  23 8* 25 1*  --  25 6*  --  26 2*   PLATELETS Thousands/uL  --  572* 576*  --  516* 492*  --  419*  --  483*   POTASSIUM mmol/L  --  4 3  --  4 6 4 5 4 7 5 7* 4 9  --  5 2   CHLORIDE mmol/L  --  103  --  104 103 102 102 100  --  101   CO2 mmol/L  --  22  --  19* 23 24 24 25  --  24   BUN mg/dL  --  20  --  19 18 23 23 25  --  24   CREATININE mg/dL  --  1 58*  --  1 61* 1 55* 1 57* 1 49* 1 36*  --  1 30   CALCIUM mg/dL  --  7 9*  --  8 1* 8 2* 8 1* 8 8 8 4  --  7 7*   ALK PHOS U/L  --   --   --   --   --  144* 156*  --   --  206*   ALT U/L  --   --   --   --   --  42 47  --   --  83*   AST U/L  --   --   --   --   --  30 52*  --   --  129*   BLOOD CULTURE   --   --   --   --   --   --   --   --  No Growth After 5 Days  No Growth After 5 Days  --    LEUKOCYTES UA  Negative  --   --   --   --   --   --   --   --   --    BLOOD UA  Negative  --   --   --   --   --   --   --   --   --          Portions of the record may have been created with voice recognition software  Occasional wrong word or "sound a like" substitutions may have occurred due to the inherent limitations of voice recognition software  Read the chart carefully and recognize, using context, where substitutions have occurred  If you have any questions, please contact the dictating provider

## 2022-10-02 NOTE — ASSESSMENT & PLAN NOTE
· Noted on CT C/A/P 9/26  - Focal thickening of the lower portion of the endometrium  While this is not expected in a patient of this age, this appears stable from 2016 suggesting a benign etiology  If there is clinical concern, follow-up ultrasound is recommended  - Small left upper lobe nodules, one of which is new from March 2022  Given the history of smoking, a follow-up chest CT is recommended in 12 months      · This was d/w sister and brother POA over phone by prior provider 9/28

## 2022-10-02 NOTE — ASSESSMENT & PLAN NOTE
· Recent hx of staph epi bacteremia in 7/2022, presumed due to picc line  · Recurrence 1/2 set staph epi, 2nd set without growth  Was treated with intravenous vancomycin for presumed line infection  · S/p TAVIA (9/9) no evidence of vegetation  · Blood cultures had been negative however repeated again due to isolated fever which are negative to date    · PICC and TPN discontinued, now has PEG tube  · Monitor off abx

## 2022-10-02 NOTE — ASSESSMENT & PLAN NOTE
· History of Savage-en-Y gastric bypass  Has had significant issues over the last 6 months or so with chronic malnutrition and an anastomotic ulcerations  When last seen by bariatric surgery, it was recommended to continue on prolonged TPN for nutritional optimization with possible revision of the Savage-en-Y in the future    · Given her recurrence PICC line infections and bacteremia, TPN has been discontinued  · Continue tube feeds for nutritional optimization  · Will need significant improvement before consideration can be given to further surgical intervention

## 2022-10-02 NOTE — ASSESSMENT & PLAN NOTE
· Evaluated by palliative care  Wants to continue medical directed treatments with limits of DNR/DNI  · ontinue current pain management regimen with scheduled oxycodone and additional p r n  Doses  Should follow-up with palliative Care as outpatient

## 2022-10-02 NOTE — ASSESSMENT & PLAN NOTE
· Recent history of C diff colitis  · Treated with prophylactic dose of p o   Vancomycin while on antibiotics

## 2022-10-02 NOTE — ASSESSMENT & PLAN NOTE
· Patient has waxing waning mental status   · Cognition and mental status issues multifactorial in nature stemming from prolonged illness, numerous hospitalizations, chronic malnutrition, and chronic/recurrent infections    · Monitor clinically  · Delirium precautions

## 2022-10-02 NOTE — ASSESSMENT & PLAN NOTE
Malnutrition Findings:   Adult Malnutrition type: Chronic illness  Adult Degree of Malnutrition: Other severe protein calorie malnutrition  Malnutrition Characteristics: Weight loss, Muscle loss       360 Statement: Severe/Chronic malnutrition r/t condition, as evidenced by 4 8% wt loss x < 1 week (9/2/22: 125#, 9/5/22: 119#), and severe muscle mass depletion (temples/clavicle)  Treated with liberalized diet and nutrition supplements, possibly nutrition support pending goals of care    BMI Findings: Body mass index is 18 28 kg/m²     · Continue tube feeds

## 2022-10-02 NOTE — ASSESSMENT & PLAN NOTE
· Renal function appears to have stabilized around creatinine of 1 5  · Possibly new baseline per Nephrology  · Monitor BMP

## 2022-10-02 NOTE — ASSESSMENT & PLAN NOTE
· Initially met criteria with fever, tachypnea, tachycardia, and leukocytosis on admission  Sepsis secondary to recurrent bacteremia and PICC line infections    · PICC line has been removed and patient had PEG tube placed for nutritional support  · Finished full course of ertapenem for ESBL UTI

## 2022-10-02 NOTE — ASSESSMENT & PLAN NOTE
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

## 2022-10-03 NOTE — PLAN OF CARE
Problem: Nutrition/Hydration-ADULT  Goal: Nutrient/Hydration intake appropriate for improving, restoring or maintaining nutritional needs  Description: Monitor and assess patient's nutrition/hydration status for malnutrition  Collaborate with interdisciplinary team and initiate plan and interventions as ordered  Monitor patient's weight and dietary intake as ordered or per policy  Utilize nutrition screening tool and intervene as necessary  Determine patient's food preferences and provide high-protein, high-caloric foods as appropriate       INTERVENTIONS:  - Monitor oral intake, urinary output, labs, and treatment plans  - Assess nutrition and hydration status and recommend course of action  - Evaluate amount of meals eaten  - Assist patient with eating if necessary   - Allow adequate time for meals  - Recommend/ encourage appropriate diets, oral nutritional supplements, and vitamin/mineral supplements  - Order, calculate, and assess calorie counts as needed  - Recommend, monitor, and adjust tube feedings and TPN/PPN based on assessed needs  - Assess need for intravenous fluids  - Provide specific nutrition/hydration education as appropriate  - Include patient/family/caregiver in decisions related to nutrition  Outcome: Progressing     Problem: Prexisting or High Potential for Compromised Skin Integrity  Goal: Skin integrity is maintained or improved  Description: INTERVENTIONS:  - Identify patients at risk for skin breakdown  - Assess and monitor skin integrity  - Assess and monitor nutrition and hydration status  - Monitor labs   - Assess for incontinence   - Turn and reposition patient  - Assist with mobility/ambulation  - Relieve pressure over bony prominences  - Avoid friction and shearing  - Provide appropriate hygiene as needed including keeping skin clean and dry  - Evaluate need for skin moisturizer/barrier cream  - Collaborate with interdisciplinary team   - Patient/family teaching  - Consider wound care consult   Outcome: Progressing     Problem: INFECTION - ADULT  Goal: Absence or prevention of progression during hospitalization  Description: INTERVENTIONS:  - Assess and monitor for signs and symptoms of infection  - Monitor lab/diagnostic results  - Monitor all insertion sites, i e  indwelling lines, tubes, and drains  - Monitor endotracheal if appropriate and nasal secretions for changes in amount and color  - Hasty appropriate cooling/warming therapies per order  - Administer medications as ordered  - Instruct and encourage patient and family to use good hand hygiene technique  - Identify and instruct in appropriate isolation precautions for identified infection/condition  Outcome: Progressing  Goal: Absence of fever/infection during neutropenic period  Description: INTERVENTIONS:  - Monitor WBC    Outcome: Progressing

## 2022-10-03 NOTE — PROGRESS NOTES
[address] [facility]  Progress Note - Noemy Almanzar 1945, 76 y o  female MRN: [id number]  Unit/Bed#: [facility] 317-01 Encounter: [id number]  Primary Care Provider: Nelida Li MD   Date and time admitted to hospital: 8/30/2022 11:49 PM    * Sepsis [facility])  Assessment & Plan  - initially met criteria with fever, tachypnea, tachycardia, and leukocytosis on admission  Sepsis secondary to recurrent bacteremia and PICC line infections  - PICC line has been removed and patient had PEG tube placed for nutritional support  - finished full course of ertapenem for ESBL UTI    - sepsis resolved    H/O bariatric surgery - bypass  Assessment & Plan  - history of Savage-en-Y gastric bypass  Has had significant issues over the last 6 months or so with chronic malnutrition and an anastomotic ulcerations  When last seen by bariatric surgery, it was recommended to continue on prolonged TPN for nutritional optimization with possible revision of the Savage-en-Y in the future   - given her recurrence PICC line infections and bacteremia, TPN has been discontinued  - continue tube feeds for nutritional optimization  - will need significant improvement before consideration can be given to further surgical intervention      Diarrhea  Assessment & Plan  - recent history of C diff colitis  - treated with prophylactic dose of p o  Vancomycin while on antibiotics     Urinary retention  Assessment & Plan  - Has required intermittent catheterizations however now voiding spontaneously  - Continue retention protocol    Hypotension  Assessment & Plan  - continue midodrine 10 mg t i d     Acute encephalopathy  Assessment & Plan  - patient has waxing waning mental status though no current insult triggering encephalopathy  - cognition and mental status issues multifactorial in nature stemming from prolonged illness, numerous hospitalizations, chronic malnutrition, and chronic/recurrent infections    - monitor clinically  - delirium precautions      Goals of care, counseling/discussion  Assessment & Plan  - Evaluated by palliative care  Wants to continue medical directed treatments with limits of DNR/DNI   - Continue current pain management regimen with scheduled oxycodone and additional p r n  Doses  Should follow-up with palliative Care as outpatient  Acute on chronic anemia  Assessment & Plan  - 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  - S/p recent EGD in 7/22 revealing: Residual marginal ulcer of the gastrojejunostomy anastomosis with improved size  - S/p EGD 9/2 revealing: Large, cratered ulcer in the gastrojejunal anastomosis     - status post 1 unit PRBCs this admission  - continue PPI b i d  Bacteremia  Assessment & Plan  - Recent hx of staph epi bacteremia in 7/2022, presumed due to picc line  - Recurrence 1/2 set staph epi, 2nd set without growth  Was treated with intravenous vancomycin for presumed line infection   - S/p TAVIA (9/9) no evidence of vegetation  - Blood cultures had been negative however repeated again due to isolated fever which are negative to date  - PICC and TPN discontinued, now has PEG tube  Monitor off abx's    Moderate protein-calorie malnutrition ([address] )  Assessment & Plan  Malnutrition Findings:   Adult Malnutrition type: Chronic illness  Adult Degree of Malnutrition: Other severe protein calorie malnutrition  Malnutrition Characteristics: Weight loss, Muscle loss       360 Statement: Severe/Chronic malnutrition r/t condition, as evidenced by 4 8% wt loss x < 1 week (9/2/22: 125#, 9/5/22: 119#), and severe muscle mass depletion (temples/clavicle)  Treated with liberalized diet and nutrition supplements, possibly nutrition support pending goals of care    BMI Findings: Body mass index is 18 28 kg/m²     · Continue tube feeds    Ambulatory dysfunction  Assessment & Plan  - will need rehab on discharge      NOAH (acute kidney injury) ([facility] [address] )  Assessment & Plan  - renal function appears to have stabilized around creatinine of 1 5  - likely new baseline  - monitor urine output    Abnormal CT scan  Assessment & Plan  Noted on CT C/A/P 9/26  - Focal thickening of the lower portion of the endometrium  While this is not expected in a patient of this age, this appears stable from 2016 suggesting a benign etiology  If there is clinical concern, follow-up ultrasound is recommended  - Small left upper lobe nodules, one of which is new from March 2022  Given the history of smoking, a follow-up chest CT is recommended in 12 months  - This was d/w sister and brother POA over phone by prior provider 9/28    Hyponatremia  Assessment & Plan  - stable in the low 30s  - continue sodium chloride tabs 2 g q 8        VTE Pharmacologic Prophylaxis: VTE Score: 3 Moderate Risk (Score 3-4) - Pharmacological DVT Prophylaxis Ordered: heparin  Patient Centered Rounds: I performed bedside rounds with nursing staff today  Discussions with Specialists or Other Care Team Provider: KEITH      Education and Discussions with Family / Patient: Patient declined call to   Time Spent for Care: 30 minutes  More than 50% of total time spent on counseling and coordination of care as described above  Current Length of Stay: 33 day(s)  Current Patient Status: Inpatient   Certification Statement: The patient will continue to require additional inpatient hospital stay due to discharge planning, rehab placement  Discharge Plan: Anticipate discharge in >72 hrs to rehab facility  Code Status: Level 3 - DNAR and DNI    Subjective:   Patient seen and examined  Following up for malnutrition, failure to thrive, numerous issues as listed above  Patient resting comfortably in bed  Was quite disoriented this morning when I evaluated her, however after re-evaluation the afternoon patient was better  Seems to be transient    Very mild abdominal pain around the PEG tube site but otherwise no complaints  And no significant findings on physical exam, PEG tube looks appropriate  Objective:     Vitals:   Temp (24hrs), Av 4 °F (36 9 °C), Min:97 9 °F (36 6 °C), Max:99 °F (37 2 °C)    Temp:  [97 9 °F (36 6 °C)-99 °F (37 2 °C)] 98 2 °F (36 8 °C)  HR:  [72-97] 84  Resp:  [14] 14  BP: ()/(55-56) 99/55  SpO2:  [92 %-95 %] 95 %  Body mass index is 18 28 kg/m²  Input and Output Summary (last 24 hours): Intake/Output Summary (Last 24 hours) at 10/3/2022 1655  Last data filed at 10/3/2022 0523  Gross per 24 hour   Intake --   Output 900 ml   Net -900 ml       PHYSICAL EXAM:    Vitals signs reviewed  Constitutional  NAD  Chronically ill-appearing  Malnourished  Head/Neck   Normocephalic  Atraumatic  HEENT   No scleral icterus  EOMI  Heart   Regular rate and rhythm  No murmers  Lungs   Clear to auscultation bilaterally  Respirations unlaboured  Abdomen   Soft  Nontender  Nondistended  Peg tube site clean and dry, no surrounding erythema or drainage  No guarding or rebound   Skin   Skin color pale  No rashes  Extremities   No deformities  No peripheral edema  Neuro   Drowsy but arousable  No new deficits  Psych   Mood stable  Affect normal          Additional Data:     Labs:  Results from last 7 days   Lab Units 10/01/22  0554 22  1657 22  1139   WBC Thousand/uL 13 50*   < > 13 08*   HEMOGLOBIN g/dL 7 9*   < > 7 5*   HEMATOCRIT % 25 5*   < > 23 8*   PLATELETS Thousands/uL 572*   < > 516*   NEUTROS PCT %  --   --  71   LYMPHS PCT %  --   --  11*   MONOS PCT %  --   --  7   EOS PCT %  --   --  10*    < > = values in this interval not displayed       Results from last 7 days   Lab Units 10/01/22  0554 22  1139 22  1512   SODIUM mmol/L 132*   < > 132*   POTASSIUM mmol/L 4 3   < > 4 7   CHLORIDE mmol/L 103   < > 102   CO2 mmol/L 22   < > 24   BUN mg/dL 20   < > 23   CREATININE mg/dL 1 58*   < > 1 57*   ANION GAP mmol/L 7   < > 6   CALCIUM mg/dL 7 9*   < > 8 1*   ALBUMIN g/dL  --   --  1 5*   TOTAL BILIRUBIN mg/dL  --   --  0 29   ALK PHOS U/L  --   --  144*   ALT U/L  --   --  42   AST U/L  --   --  30   GLUCOSE RANDOM mg/dL 126   < > 134    < > = values in this interval not displayed  Results from last 7 days   Lab Units 10/03/22  1030 10/03/22  0117 10/02/22  1613 10/02/22  3404 10/01/22  1637 10/01/22  1111 10/01/22  0551 10/01/22  0120 09/30/22  1714 09/29/22  1752 09/29/22  1213 09/29/22  0606   POC GLUCOSE mg/dl 130 101 102 126 95 124 136 140 93 91 106 95               Lines/Drains:  Invasive Devices  Report    Drain  Duration           Gastrostomy/Enterostomy Percutaneous Endoscopic Gastrostomy (PEG) 20 Fr  LUQ 25 days                      Imaging: No pertinent imaging reviewed      Recent Cultures (last 7 days):         Last 24 Hours Medication List:   Current Facility-Administered Medications   Medication Dose Route Frequency Provider Last Rate   • acetaminophen  975 mg Oral Q6H PRN Cecilio Martinez PA-C     • ALPRAZolam  0 25 mg Oral TID PRN Tommy Juarez DO     • alteplase  2 mg Intracatheter Once Larry Sevilla PA-C     • bisacodyl  10 mg Rectal Daily PRN Melissa Olvera MD     • bisacodyl  10 mg Rectal Once Natividad Hsu MD     • clonazePAM  3 mg Oral HS Jovita Mcdermott DO     • folic acid  1 mg Oral Daily Jovita Mcdermott DO     • guaiFENesin  600 mg Oral Q12H [address] Cecilio Martinez PA-C     • heparin (porcine)  5,000 Units Subcutaneous Q8H [address] Kaylanmichael Arroyo DO     • [START ON 10/4/2022] levothyroxine  112 mcg Oral Q24H [place] Prieto Quinn MD     • magnesium hydroxide  30 mL Oral Daily PRN Melissa Olvera MD     • metoclopramide  10 mg Intravenous Q6H PRN Melissa Olvera MD     • midodrine  10 mg Oral TID TOLU Flanagan     • ondansetron  4 mg Intravenous Q6H PRN Luigi May DO     • oxyCODONE  5 mg Oral Q6H Melissa Olvera MD     • pantoprazole  40 mg Oral BID AC Kaylan Arroyo DO     • polyethylene glycol  17 g Per PEG Tube BID Xi Jauregui DO     • senna  2 tablet Oral HS Laila Jaramillo MD     • sodium chloride  2 g Oral TID Trung Candelaria DO     • thiamine  100 mg Oral Daily Jovita Mcdermtot DO     • traZODone  150 mg Oral HS Xi Jauregui DO          Today, Patient Was Seen By: Henrry Vazquez    **Please Note: This note may have been constructed using a voice recognition system  **

## 2022-10-03 NOTE — PROGRESS NOTES
Progress Note - Nephrology   Magi Rockwell 76 y o  female MRN: [id number]  Unit/Bed#: [facility] 317-01 Encounter: [id number]      Assessment / Plan:  1  Elevated sCr in the setting of recent sepsis with UTI - b/l sCr 0 8-1 2, now stable 1 5-1 6  Suspect possible NOAH vs new baseline in light of low systolic BP readings  -monitor BMP    2  Hyponatremia likely due to poor solute intake with gastric bypass - sNa improving with salt tabs 2g TID, f/u am BMP    3  Hypotension - continue midodrine 10mg po TID, am cortisol 11 5        Subjective:   She is refusing her salt tablets  She denies chest pain or shortness of breath  Objective:     Vitals: Blood pressure 121/56, pulse 97, temperature 99 °F (37 2 °C), temperature source Oral, resp  rate 14, height 5' 4" (1 626 m), weight 48 3 kg (106 lb 7 7 oz), SpO2 93 %  ,Body mass index is 18 28 kg/m²  Temp (24hrs), Av 6 °F (37 °C), Min:97 9 °F (36 6 °C), Max:99 °F (37 2 °C)      Weight (last 2 days)     None            Intake/Output Summary (Last 24 hours) at 10/3/2022 1353  Last data filed at 10/3/2022 0523  Gross per 24 hour   Intake --   Output 1900 ml   Net -1900 ml     I/O last 24 hours: In: -   Out: 1900 [Urine:1900]        Physical Exam:   Physical Exam  Vitals and nursing note reviewed  Constitutional:       General: She is not in acute distress  Appearance: Normal appearance  She is well-developed  She is not diaphoretic  Comments: thin   HENT:      Head: Normocephalic and atraumatic  Nose: Nose normal       Mouth/Throat:      Mouth: Mucous membranes are dry  Pharynx: No oropharyngeal exudate  Eyes:      General: No scleral icterus  Right eye: No discharge  Left eye: No discharge  Neck:      Thyroid: No thyromegaly  Cardiovascular:      Rate and Rhythm: Normal rate and regular rhythm  Heart sounds: No murmur heard  Pulmonary:      Effort: Pulmonary effort is normal  No respiratory distress        Breath sounds: Normal breath sounds  No wheezing  Abdominal:      General: Bowel sounds are normal  There is no distension  Palpations: Abdomen is soft  Comments: ostomy   Musculoskeletal:         General: No swelling  Cervical back: Normal range of motion and neck supple  Skin:     General: Skin is warm and dry  Findings: No rash  Neurological:      General: No focal deficit present  Mental Status: She is alert  Motor: No abnormal muscle tone        Comments: awake   Psychiatric:         Mood and Affect: Mood normal          Behavior: Behavior normal       Comments: Odd affect         Invasive Devices  Report    Drain  Duration           Gastrostomy/Enterostomy Percutaneous Endoscopic Gastrostomy (PEG) 20 Fr  LUQ 24 days                Medications:    Scheduled Meds:  Current Facility-Administered Medications   Medication Dose Route Frequency Provider Last Rate   • acetaminophen  975 mg Oral Q6H PRN Checo Brito PA-C     • ALPRAZolam  0 25 mg Oral TID PRN Lara Nichols DO     • alteplase  2 mg Intracatheter Once Larry Sevilla PA-C     • bisacodyl  10 mg Rectal Daily PRN Merlin Luna, MD     • bisacodyl  10 mg Rectal Once Jason Wall MD     • clonazePAM  3 mg Oral HS Jovita Mcdermott DO     • folic acid  1 mg Oral Daily Jovita Mcdermott DO     • guaiFENesin  600 mg Oral Q12H [address] Checo Brito PA-C     • heparin (porcine)  5,000 Units Subcutaneous Q8H [address] Kaylan Arroyo DO     • [START ON 10/4/2022] levothyroxine  112 mcg Oral Q24H [place] Hemanth Mtichell MD     • magnesium hydroxide  30 mL Oral Daily PRN Merlin Luna, MD     • metoclopramide  10 mg Intravenous Q6H PRN Merlin Luna, MD     • midodrine  10 mg Oral TID AC TOLU Orellana     • ondansetron  4 mg Intravenous Q6H PRN Ayse Davis DO     • oxyCODONE  5 mg Oral Q6H Merlin Luna, MD     • pantoprazole  40 mg Oral BID AC Kaylan Arroyo DO     • polyethylene glycol  17 g Per PEG Tube BID Cindy Foley DO     • senna  2 tablet Oral HS Geoff Loving MD     • sodium chloride  2 g Oral TID Priya Thomas DO     • thiamine  100 mg Oral Daily Jovita Mcdermott DO     • traZODone  150 mg Oral HS Jovita Mcdermott DO         PRN Meds: •  acetaminophen  •  ALPRAZolam  •  bisacodyl  •  magnesium hydroxide  •  metoclopramide  •  ondansetron    Continuous Infusions:         LAB RESULTS:      Results from last 7 days   Lab Units 10/01/22  0554 09/30/22  1657 09/30/22  1357 09/29/22  1139 09/28/22  1512 09/28/22  1043 09/27/22  0909   WBC Thousand/uL 13 50* 15 34*  --  13 08* 13 42*  --  11 31*   HEMOGLOBIN g/dL 7 9* 7 8*  --  7 5* 7 8*  --  8 0*   HEMATOCRIT % 25 5* 25 2*  --  23 8* 25 1*  --  25 6*   PLATELETS Thousands/uL 572* 576*  --  516* 492*  --  419*   NEUTROS PCT %  --   --   --  71 68  --  66   LYMPHS PCT %  --   --   --  11* 15  --  16   MONOS PCT %  --   --   --  7 7  --  7   EOS PCT %  --   --   --  10* 9*  --  10*   POTASSIUM mmol/L 4 3  --  4 6 4 5 4 7 5 7* 4 9   CHLORIDE mmol/L 103  --  104 103 102 102 100   CO2 mmol/L 22  --  19* 23 24 24 25   BUN mg/dL 20  --  19 18 23 23 25   CREATININE mg/dL 1 58*  --  1 61* 1 55* 1 57* 1 49* 1 36*   CALCIUM mg/dL 7 9*  --  8 1* 8 2* 8 1* 8 8 8 4   ALK PHOS U/L  --   --   --   --  144* 156*  --    ALT U/L  --   --   --   --  42 47  --    AST U/L  --   --   --   --  30 52*  --        CUTURES:  Lab Results   Component Value Date    BLOODCX No Growth After 5 Days  09/26/2022    BLOODCX No Growth After 5 Days  09/26/2022    BLOODCX No Growth After 5 Days  09/23/2022    BLOODCX No Growth After 5 Days  09/23/2022    BLOODCX No Growth After 5 Days  09/02/2022    BLOODCX No Growth After 5 Days  09/02/2022    BLOODCX No Growth After 5 Days   08/31/2022    BLOODCX Staphylococcus epidermidis (A) 08/31/2022    BLOODCX Staphylococcus pettenkoferi (A) 08/31/2022    URINECX >100,000 cfu/ml Proteus vulgaris (A) 09/23/2022    URINECX >100,000 cfu/ml Citrobacter freundii (A) 09/23/2022    URINECX >100,000 cfu/ml Klebsiella oxytoca ESBL (A) 09/23/2022    URINECX >100,000 cfu/ml Morganella morganii (A) 09/23/2022    URINECX (A) 08/31/2022     80,000-89,000 cfu/ml - 2 strains Gram Negative Jerome Enteric Like    URINECX 40,000-49,000 cfu/ml Proteus species (A) 08/31/2022    URINECX 40,000-49,000 cfu/ml Enterococcus species (A) 08/31/2022                 Portions of the record may have been created with voice recognition software  Occasional wrong word or "sound a like" substitutions may have occurred due to the inherent limitations of voice recognition software  Read the chart carefully and recognize, using context, where substitutions have occurred  If you have any questions, please contact the dictating provider

## 2022-10-04 NOTE — QUICK NOTE
Chart reviewed    Appropriately getting levothyroxine at 2pm as recommended (4 hours after tube feeds have stopped running)  Recheck TFT's in a week

## 2022-10-04 NOTE — ASSESSMENT & PLAN NOTE
· TSH elevated and fT4 low  · Endocrinology consulted - levothyroxine timing was adjusted to 2pm per Endocrinology recs (4 hours after tube feeds have stopped running)  · Repeat TFTs in 1 week per Endocrine

## 2022-10-04 NOTE — PROGRESS NOTES
Pastoral Care Progress Note    10/4/2022  Patient: Lenwood Fabry : 1945  Admission Date & Time: 2022 2349  MRN: [id number] CSN: [id number]                     Fr James Tillman visited and provided prayer, communion

## 2022-10-04 NOTE — PLAN OF CARE
Problem: MOBILITY - ADULT  Goal: Maintain or return to baseline ADL function  Description: INTERVENTIONS:  -  Assess patient's ability to carry out ADLs; assess patient's baseline for ADL function and identify physical deficits which impact ability to perform ADLs (bathing, care of mouth/teeth, toileting, grooming, dressing, etc )  - Assess/evaluate cause of self-care deficits   - Assess range of motion  - Assess patient's mobility; develop plan if impaired  - Assess patient's need for assistive devices and provide as appropriate  - Encourage maximum independence but intervene and supervise when necessary  - Involve family in performance of ADLs  - Assess for home care needs following discharge   - Consider OT consult to assist with ADL evaluation and planning for discharge  - Provide patient education as appropriate  10/4/2022 1816 by Tuan Dos Santos RN  Outcome: Progressing  10/4/2022 1816 by Tuan Dos Santos RN  Outcome: Progressing

## 2022-10-04 NOTE — PROGRESS NOTES
[address] [facility]  Progress Note - Rico Wilder 1945, 76 y o  female MRN: [id number]  Unit/Bed#: [facility] 317-01 Encounter: [id number]  Primary Care Provider: Howie Ly MD   Date and time admitted to hospital: 8/30/2022 11:49 PM    * Sepsis [facility])  Assessment & Plan  · Initially met criteria with fever, tachypnea, tachycardia, and leukocytosis on admission  Sepsis secondary to recurrent bacteremia and PICC line infections  · PICC line has been removed and patient had PEG tube placed for nutritional support  · Finished full course of ertapenem for ESBL UTI    Bacteremia  Assessment & Plan  · Recent hx of staph epi bacteremia in 7/2022, presumed due to picc line  · Recurrence 1/2 set staph epi, 2nd set without growth  Was treated with intravenous vancomycin for presumed line infection  · S/p TAVIA (9/9) no evidence of vegetation  · Blood cultures had been negative however repeated again due to isolated fever which are negative to date  · PICC and TPN discontinued, now has PEG tube  · Monitor off abx    Diarrhea  Assessment & Plan  · Recent history of C diff colitis  · Treated with prophylactic dose of p o  Vancomycin while on antibiotics     NOAH (acute kidney injury) ([facility] [address] )  Assessment & Plan  · Renal function appears to have stabilized around creatinine of 1 5  · Possibly new baseline per Nephrology  · Monitor BMP    Hyponatremia  Assessment & Plan  · Stable in the low 30s on last check  · Nephrology following   · Continue sodium chloride tabs 2 g q 8      Acute encephalopathy  Assessment & Plan  · Patient has waxing waning mental status   · Cognition and mental status issues multifactorial in nature stemming from prolonged illness, numerous hospitalizations, chronic malnutrition, and chronic/recurrent infections    · Monitor clinically  · Delirium precautions      Urinary retention  Assessment & Plan  · as required intermittent catheterizations however now voiding spontaneously  · Continue retention protocol    Abnormal CT scan  Assessment & Plan  · Noted on CT C/A/P 9/26  - Focal thickening of the lower portion of the endometrium  While this is not expected in a patient of this age, this appears stable from 2016 suggesting a benign etiology  If there is clinical concern, follow-up ultrasound is recommended  - Small left upper lobe nodules, one of which is new from March 2022  Given the history of smoking, a follow-up chest CT is recommended in 12 months  · This was d/w sister and brother POA over phone by prior provider 9/28    Acute on chronic anemia  Assessment & Plan  · 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  · S/p recent EGD in 7/22 revealing: Residual marginal ulcer of the gastrojejunostomy anastomosis with improved size  · S/p EGD 9/2 revealing: Large, cratered ulcer in the gastrojejunal anastomosis   · Status post 1 unit PRBCs this admission  · Continue PPI b i d  Ambulatory dysfunction  Assessment & Plan  · Will need rehab on discharge      Goals of care, counseling/discussion  Assessment & Plan  · Evaluated by palliative care  Wants to continue medical directed treatments with limits of DNR/DNI  · ontinue current pain management regimen with scheduled oxycodone and additional p r n  Doses  Should follow-up with palliative Care as outpatient  Hypotension  Assessment & Plan  · Continue midodrine 10 mg t i d     H/O bariatric surgery - bypass  Assessment & Plan  · History of Savage-en-Y gastric bypass  Has had significant issues over the last 6 months or so with chronic malnutrition and an anastomotic ulcerations  When last seen by bariatric surgery, it was recommended to continue on prolonged TPN for nutritional optimization with possible revision of the Savage-en-Y in the future    · Given her recurrence PICC line infections and bacteremia, TPN has been discontinued  · Continue tube feeds for nutritional optimization  · Will need significant improvement before consideration can be given to further surgical intervention      Moderate protein-calorie malnutrition ([address] )  Assessment & Plan  Malnutrition Findings:   Adult Malnutrition type: Chronic illness  Adult Degree of Malnutrition: Other severe protein calorie malnutrition  Malnutrition Characteristics: Weight loss, Muscle loss       360 Statement: Severe/Chronic malnutrition r/t condition, as evidenced by 4 8% wt loss x < 1 week (9/2/22: 125#, 9/5/22: 119#), and severe muscle mass depletion (temples/clavicle)  Treated with liberalized diet and nutrition supplements, possibly nutrition support pending goals of care    BMI Findings: Body mass index is 18 28 kg/m²  · Continue tube feeds    Hypothyroidism  Assessment & Plan  · TSH elevated and fT4 low  · Endocrinology consulted - levothyroxine timing was adjusted to 2pm per Endocrinology recs (4 hours after tube feeds have stopped running)  · Repeat TFTs in 1 week per Endocrine        VTE Pharmacologic Prophylaxis: VTE Score: 3 Moderate Risk (Score 3-4) - Pharmacological DVT Prophylaxis Ordered: heparin  Patient Centered Rounds: I performed bedside rounds with nursing staff today  d/w RN  Discussions with Specialists or Other Care Team Provider:     Education and Discussions with Family / Patient: Updated  (brother in law) at bedside  Time Spent for Care: 30 minutes  More than 50% of total time spent on counseling and coordination of care as described above  Current Length of Stay: 34 day(s)  Current Patient Status: Inpatient   Certification Statement: The patient will continue to require additional inpatient hospital stay due to hyponatremia, monitoring renal function, pending rehab  Discharge Plan: to rehab    Code Status: Level 3 - DNAR and DNI    Subjective:   Ms Brando Williamson reports mild discomfort at PEG tube site  Otherwise she denies complaint  She reports feeling tired   She is agreeable to lab draws - RN and PCA aware     Objective:     Vitals:   Temp (24hrs), Av 7 °F (37 1 °C), Min:98 2 °F (36 8 °C), Max:99 °F (37 2 °C)    Temp:  [98 2 °F (36 8 °C)-99 °F (37 2 °C)] 98 8 °F (37 1 °C)  HR:  [80-97] 80  Resp:  [14] 14  BP: ()/(46-56) 103/46  SpO2:  [93 %-95 %] 94 %  Body mass index is 18 28 kg/m²  Input and Output Summary (last 24 hours): Intake/Output Summary (Last 24 hours) at 10/4/2022 1148  Last data filed at 10/4/2022 0617  Gross per 24 hour   Intake --   Output 800 ml   Net -800 ml       Physical Exam:   Physical Exam  Vitals reviewed  Constitutional:       Comments: Patient seen lying in bed with her LUCIA present at bedside  Chronically ill appearing    Cardiovascular:      Rate and Rhythm: Normal rate and regular rhythm  Pulmonary:      Effort: Pulmonary effort is normal  No respiratory distress  Breath sounds: Normal breath sounds  Abdominal:      General: Bowel sounds are normal       Palpations: Abdomen is soft  Tenderness: There is no abdominal tenderness  Musculoskeletal:      Right lower leg: No edema  Left lower leg: No edema  Skin:     General: Skin is warm and dry  Coloration: Skin is pale  Neurological:      Mental Status: She is alert  Comments: Oriented to self and year but states she is in a bedroom   Psychiatric:         Mood and Affect: Mood normal          Behavior: Behavior normal          Additional Data:     Labs:  Results from last 7 days   Lab Units 10/04/22  1124 22  1657 22  1139   WBC Thousand/uL 13 50*   < > 13 08*   HEMOGLOBIN g/dL 8 4*   < > 7 5*   HEMATOCRIT % 26 6*   < > 23 8*   PLATELETS Thousands/uL 582*   < > 516*   NEUTROS PCT %  --   --  71   LYMPHS PCT %  --   --  11*   MONOS PCT %  --   --  7   EOS PCT %  --   --  10*    < > = values in this interval not displayed       Results from last 7 days   Lab Units 10/01/22  0554 22  1139 22  1512   SODIUM mmol/L 132*   < > 132*   POTASSIUM mmol/L 4 3   < > 4  7   CHLORIDE mmol/L 103   < > 102   CO2 mmol/L 22   < > 24   BUN mg/dL 20   < > 23   CREATININE mg/dL 1 58*   < > 1 57*   ANION GAP mmol/L 7   < > 6   CALCIUM mg/dL 7 9*   < > 8 1*   ALBUMIN g/dL  --   --  1 5*   TOTAL BILIRUBIN mg/dL  --   --  0 29   ALK PHOS U/L  --   --  144*   ALT U/L  --   --  42   AST U/L  --   --  30   GLUCOSE RANDOM mg/dL 126   < > 134    < > = values in this interval not displayed  Results from last 7 days   Lab Units 10/03/22  2112 10/03/22  1824 10/03/22  1030 10/03/22  0117 10/02/22  1613 10/02/22  3869 10/01/22  1637 10/01/22  1111 10/01/22  0551 10/01/22  0120 09/30/22  1714 09/29/22  1752   POC GLUCOSE mg/dl 120 87 130 101 102 126 95 124 136 140 93 91               Lines/Drains:  Invasive Devices  Report    Drain  Duration           Gastrostomy/Enterostomy Percutaneous Endoscopic Gastrostomy (PEG) 20 Fr  LUQ 25 days                      Imaging: No pertinent imaging reviewed      Recent Cultures (last 7 days):         Last 24 Hours Medication List:   Current Facility-Administered Medications   Medication Dose Route Frequency Provider Last Rate   • acetaminophen  975 mg Oral Q6H PRN Ale Chew PA-C     • ALPRAZolam  0 25 mg Oral TID PRN Consuelo Geller DO     • alteplase  2 mg Intracatheter Once Relijackson Sevilla PA-C     • bisacodyl  10 mg Rectal Daily PRN Ana Espino MD     • bisacodyl  10 mg Rectal Once Mandy Nichole MD     • clonazePAM  3 mg Oral HS Jovitakash Mcdermott, DO     • folic acid  1 mg Oral Daily Jovitakash Mcdermott DO     • guaiFENesin  600 mg Oral Q12H [address] Ale Chew PA-C     • heparin (porcine)  5,000 Units Subcutaneous Q8H [address] Kaylan Mitchell DO     • levothyroxine  112 mcg Oral Q24H [place] Yissel Valladares MD     • magnesium hydroxide  30 mL Oral Daily PRN Ana Espino MD     • metoclopramide  10 mg Intravenous Q6H PRN Ana Espino MD     • midodrine  10 mg Oral TID AC TOLU Orellana     • ondansetron  4 mg Intravenous Q6H PRN Alcon Ray, DO     • oxyCODONE  5 mg Oral Q6H Justine Cardenas MD     • pantoprazole  40 mg Oral BID AC Kaylan Arroyo DO     • polyethylene glycol  17 g Per PEG Tube BID Alcon Ray DO     • senna  2 tablet Oral HS Lucinda Pérez MD     • sodium chloride  2 g Oral TID Sonny Lowery DO     • thiamine  100 mg Oral Daily Jovita Mcdermott DO     • traZODone  150 mg Oral HS Alcon Ray DO          Today, Patient Was Seen By: Sachin Rendon    **Please Note: This note may have been constructed using a voice recognition system  **

## 2022-10-04 NOTE — QUICK NOTE
The patient's renal function remains stable at 1 5 as of 10/1/22 bloodwork  sNa remains in the 130s as of same time period  No new labs on file  Nephrology to see in follow up again if new bloodwork obtained for further recommendations  Will follow peripherally for now

## 2022-10-05 PROBLEM — E87.5 HYPERKALEMIA: Status: ACTIVE | Noted: 2022-10-05

## 2022-10-05 NOTE — WOUND OSTOMY CARE
Progress Note - Wound   Maria Teresa Nina 76 y o  female MRN: [id number]  Unit/Bed#: [place] 323-01 Encounter: [id number]        Assessment:   Patient is seen for wound care follow-up  Min assist for turning and repositioning on P-500 specialty mattress  Incontinent x 2  receives tube feeds for poor nutrition intake  BMI of 18 28  Thin and frail in appearance  Patient on strict contact precautions for C  Diff, MDRO, and ESBL  B/L heels in preventative foam allevyns - clean, dry, and intact    Findings:    1  POA Sacrum Unstageable: small, oval full thickness wound with 100% moist yellow slough, scant amount of serosanguineous drainage drainage  Wound appears stagnant in size  Unable to full appreciate wound depth due to slough tissue  Karyn-wound is hyperpigmented and surrounded with scar tissue  No induration, fluctuance, odor, warmth/temperature differences, redness, or purulence noted to the above noted wounds and skin areas assessed  New dressings applied per orders listed below  Patient tolerated well- no s/s of non-verbal pain or discomfort observed during the encounter  Bedside nurse aware of plan of care  See flow sheets for more detailed assessment findings  Orders listed below and wound care will continue to follow, call or tiger text with questions  Skin care Plan:  1-Cleanse sacro-buttocks with soap and water  Apply Triad paste to wound bed and cover with Allevyn foam  Sami with T for treatment  Change everyday and PRN  2-Turn/reposition q2h or when medically stable for pressure re-distribution on skin   3-Elevate heels to offload pressure  4-Moisturize skin daily with skin nourishing cream  5-Ehob cushion in chair when out of bed  6-B/L Preventative Heel Allevyn Foam Dressings  Sami with P  Change Q3Days or PRN   Peel back and check skin Q-Shift    7-P-500 specialty mattress         WOUNDS:  Wound 07/26/22 Pressure Injury Sacrum Mid (Active)   Wound Image   10/05/22 1102 Wound Description Yellow;Slough 10/05/22 1102   Pressure Injury Stage U 10/05/22 1102   Karyn-wound Assessment Hyperpigmented;Scar Tissue 10/05/22 1102   Wound Length (cm) 1 5 cm 10/05/22 1102   Wound Width (cm) 0 8 cm 10/05/22 1102   Wound Depth (cm) 0 3 cm 10/05/22 1102   Wound Surface Area (cm^2) 1 2 cm^2 10/05/22 1102   Wound Volume (cm^3) 0 36 cm^3 10/05/22 1102   Calculated Wound Volume (cm^3) 0 36 cm^3 10/05/22 1102   Change in Wound Size % -80 10/05/22 1102   Tunneling 0 cm 10/05/22 1102   Tunneling in depth located at 0 10/05/22 1102   Undermining 0 10/05/22 1102   Undermining is depth extending from 0 10/05/22 1102   Wound Site Closure ALICIA 10/05/22 1102   Drainage Amount Scant 10/05/22 1102   Drainage Description Serosanguineous 10/05/22 1102   Non-staged Wound Description Full thickness 10/05/22 1102   Treatments Cleansed;Site care 10/05/22 1102   Dressing Foam, Silicon (eg  Allevyn, etc) 10/05/22 1102   Wound packed? No 10/05/22 1102   Packing- # removed 0 10/05/22 1102   Packing- # inserted 0 10/05/22 1102   Dressing Changed Changed 10/05/22 1102   Patient Tolerance Tolerated well 10/05/22 1102   Dressing Status Clean;Dry; Intact 10/05/22 1600 [facility] , BSN

## 2022-10-05 NOTE — ASSESSMENT & PLAN NOTE
· Sodium 129 today  · Nephrology following   · Continue sodium chloride tabs 2 g q 8 and getting NS bolus

## 2022-10-05 NOTE — ASSESSMENT & PLAN NOTE
· Evaluated by palliative care  Wants to continue medical directed treatments with limits of DNR/DNI  · Continue current pain management regimen with scheduled oxycodone and additional p r n  Doses  Should follow-up with palliative Care as outpatient

## 2022-10-05 NOTE — ASSESSMENT & PLAN NOTE
· Potassium 5 9 this AM  · Nephrology following - giving calcium gluconate, D50/insulin, sodium bicarb  · Repeat BMP this evening per Nephro

## 2022-10-05 NOTE — ASSESSMENT & PLAN NOTE
· Patient has waxing waning mental status   · Cognition and mental status issues multifactorial in nature stemming from prolonged illness, numerous hospitalizations, chronic malnutrition, and chronic/recurrent infections    · Monitor clinically  · Delirium precautions  · Neuropsych consulted for capacity eval

## 2022-10-05 NOTE — ASSESSMENT & PLAN NOTE
· Renal function appears to have stabilized around creatinine of 1 5  · Creatinine up-trending today - 500cc NS bolus today per Nephrology recs  · Possibly new baseline per Nephrology  · Monitor BMP

## 2022-10-05 NOTE — OCCUPATIONAL THERAPY NOTE
OT CANCEL NOTE:       10/05/22 1036   OT Last Visit   OT Visit Date 10/05/22   Note Type   Note Type Cancelled Session   Assessment   Assessment pt refused to participate in OT treatment this day  attempted to educate pt on importance of mobility, therapy etc, however, she became slightly irritated  when asked why she didn't want to do therapy, she answered: "because I don't want to, now good bye"  OT will attempt to treat as per POC at a later day     Plan   Goal Expiration Date 10/17/22

## 2022-10-05 NOTE — PHYSICAL THERAPY NOTE
Physical Therapy Treatment Note    Patient's Name: Lenwood Fabry  : 1945     10/05/22 1514   PT Last Visit   PT Visit Date 10/05/22   Note Type   Note Type Treatment   Pain Assessment   Pain Assessment Tool FLACC   Pain Rating: FLACC (Rest) - Face 0   Pain Rating: FLACC (Rest) - Legs 0   Pain Rating: FLACC (Rest) - Activity 0   Pain Rating: FLACC (Rest) - Cry 0   Pain Rating: FLACC (Rest) - Consolability 0   Score: FLACC (Rest) 0   Pain Rating: FLACC (Activity) - Face 0   Pain Rating: FLACC (Activity) - Legs 0   Pain Rating: FLACC (Activity) - Activity 0   Pain Rating: FLACC (Activity) - Cry 0   Pain Rating: FLACC (Activity) - Consolability 0   Score: FLACC (Activity) 0   Restrictions/Precautions   Weight Bearing Precautions Per Order No   Other Precautions Contact/isolation;Agitated;Cognitive; Bed Alarm; Fall Risk   General   Chart Reviewed Yes   Response to Previous Treatment Patient unable to report, no changes reported from family or staff   Family/Caregiver Present No   Cognition   Overall Cognitive Status (S)  Impaired   Arousal/Participation Responsive; Uncooperative   Comments pt irritable througout session   Subjective   Subjective "I would stand up if I liked you    but I don't "   Bed Mobility   Rolling R 4  Minimal assistance   Additional items Assist x 1; Increased time required;Verbal cues; Bedrails   Rolling L 4  Minimal assistance   Additional items Assist x 1;Bedrails; Increased time required;Verbal cues   Supine to Sit 5  Supervision   Additional items   (MaxAx1 when asked to get OOB, then S when she realized she was dirty and needed to be cleaned)   Sit to Supine 5  Supervision   Additional Comments Pt greeted in supine  Transfers   Sit to Stand 5  Supervision  (ModAx1 initially, progressed to S by pulling up on back of chair when she wanted to stand to get cleaned)   Additional items Assist x 1; Increased time required;Verbal cues   Stand to Sit 5  Supervision   Additional items Assist x 1; Increased time required;Verbal cues   Ambulation/Elevation   Gait pattern   (refused)   Endurance Deficit   Endurance Deficit Yes   Endurance Deficit Description fatigue, weakness, agitation   Activity Tolerance   Activity Tolerance Patient limited by fatigue  (behavior)   Medical Staff Made Aware RN   Nurse Made Aware yes - cleared + updated   Exercises   Neuro re-ed Sitting EOB x10 min w/ MaxAx1 (pt w/ posterior lean)  Assessment   Prognosis Fair   Problem List Decreased strength; Impaired balance;Decreased mobility; Decreased coordination;Decreased cognition; Impaired judgement;Decreased safety awareness;Decreased skin integrity;Pain;Decreased endurance;Decreased range of motion   Assessment Pt agreeable to PT w/ encouragement, however irritable throughout session + minimally cooperative  Pt required ModAx1 for sit>stand t/f, however progressed to S when she wanted to stand to get cleaned  Will continue to follow as pt allows  Barriers to Discharge Inaccessible home environment;Decreased caregiver support   Goals   Patient Goals to get cleaned   PT Treatment Day 3   Plan   Treatment/Interventions Functional transfer training;LE strengthening/ROM; Therapeutic exercise; Endurance training;Patient/family training;Equipment eval/education; Bed mobility;Gait training; Compensatory technique education;Spoke to nursing  (balance training)   Progress Progressing toward goals   PT Frequency 2-3x/wk   Recommendation   PT Discharge Recommendation Return to facility with rehabilitation services   AM-PAC Basic Mobility Inpatient   Turning in Bed Without Bedrails 3   Lying on Back to Sitting on Edge of Flat Bed 3   Moving Bed to Chair 2   Standing Up From Chair 3   Walk in Room 1   Climb 3-5 Stairs 1   Basic Mobility Inpatient Raw Score 13   Basic Mobility Standardized Score 33 99   Highest Level Of Mobility   JH-HLM Goal 4: Move to chair/commode   JH-HLM Achieved 5: Stand (1 or more minutes)   Education   Education Provided Mobility training   Patient Reinforcement needed   End of Consult   Patient Position at End of Consult Supine; All needs within reach     Lj Nina, PT, DPT

## 2022-10-05 NOTE — PLAN OF CARE
Problem: PHYSICAL THERAPY ADULT  Goal: Performs mobility at highest level of function for planned discharge setting  See evaluation for individualized goals  Description:    Equipment Recommended:  (RW)       See flowsheet documentation for full assessment, interventions and recommendations  Outcome: Progressing  Note: Prognosis: Fair  Problem List: Decreased strength, Impaired balance, Decreased mobility, Decreased coordination, Decreased cognition, Impaired judgement, Decreased safety awareness, Decreased skin integrity, Pain, Decreased endurance, Decreased range of motion  Assessment: Pt agreeable to PT w/ encouragement, however irritable throughout session + minimally cooperative  Pt required ModAx1 for sit>stand t/f, however progressed to S when she wanted to stand to get cleaned  Will continue to follow as pt allows  Barriers to Discharge: Inaccessible home environment, Decreased caregiver support     PT Discharge Recommendation: Return to facility with rehabilitation services    See flowsheet documentation for full assessment

## 2022-10-05 NOTE — PROGRESS NOTES
Progress note- Nephrology   Arcadio Williamson 76 y o  female MRN: [id number]  Unit/Bed#: [facility] 323-01 Encounter: [id number]    ASSESSMENT and PLAN:  Elevated creatinine  Etiology: Suspect prerenal in the setting of GI loss with diarrhea, recurrent sepsis, anemia, and relative hypotension   Assessment:  · After review medical records through [facility] Care everywhere baseline creatinine 0 8-1 2 dating back to 11/2020  · Admission creatinine 1 35 on 08/31/2022-initially resolved with IV fluids   · Creatinine continues to fluctuate between 1 3-1 6 likely volume depending  · Current creatinine 1 58   · Continue midodrine 10 mg t i d   · Of with rising creatinine, hyperkalemia, and hyponatremia-will give 500 cc normal saline x1  · Check BMP in a m  · Also check BMP at 8:00 p m      Hypotension  Assessment and Plan:  · Current blood pressure  stable  · Current medications:  Midodrine 10 mg t i d   · Maximize hemodynamics to maintain MAP >65  · Avoid hypotension or fluctuations in blood pressure  · Will continue to trend  · Consider compression stockings     Hyponatremia  Assessment and Plan:  · Suspect component of malabsorption in the setting of prior gastric bypass, ongoing diarrhea, poor solute intake, possible SIADH with ongoing pain causing increased in ADH  · Currently on sodium salt tablets 2 gram TID-however appears to only be taking 2 times a day  · On tube feedings nightly from 6:00 p m  to 10:00 a m  with 50 cc water flushes every 6 hours  · Repeat BMP at 8:00 p m      Acute on chronic Anemia:   Assessment and Plan:  · Current hemoglobin 8 6  · Has received transfusion  · Previously evaluated by GI  · 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  · Statu post EGD in July which revealed Residual marginal ulcer of the gastrojejunostomy anastomosis with improved size  · Management per primary team:      Review of Systems  Patient seen and examined at bedside    Overall feels okay has no complaints  Review of Systems   Constitutional: Negative  Negative for activity change, appetite change, chills, diaphoresis, fatigue and fever  HENT: Negative  Negative for congestion and facial swelling  Respiratory: Negative  Cardiovascular: Negative  Gastrointestinal: Negative  Endocrine: Negative  Genitourinary: Negative  Musculoskeletal: Negative  Skin: Negative  Allergic/Immunologic: Negative  Neurological: Negative  Hematological: Negative  Psychiatric/Behavioral: Negative  All other systems reviewed and are negative  Physical Exam:  Current Weight: Weight - Scale: 48 3 kg (106 lb 7 7 oz)  Vitals:    10/03/22 2227 10/04/22 1436 10/04/22 2336 10/05/22 0756   BP: (!) 103/46 104/53 97/56 (!) 107/47   BP Location: Right arm Right arm Right arm Right arm   Pulse: 80 80 82 83   Resp: 14 16 16 16   Temp: 98 8 °F (37 1 °C) 98 7 °F (37 1 °C) 98 4 °F (36 9 °C) 98 1 °F (36 7 °C)   TempSrc: Axillary Oral Axillary Oral   SpO2: 94% 91% 96% 93%   Weight:       Height:           Physical Exam  Vitals and nursing note reviewed  Constitutional:       Comments: NAD, cachectic   HENT:      Head: Normocephalic and atraumatic  Nose: Nose normal       Mouth/Throat:      Mouth: Mucous membranes are dry  Pharynx: Oropharynx is clear  Eyes:      Conjunctiva/sclera: Conjunctivae normal    Cardiovascular:      Rate and Rhythm: Normal rate and regular rhythm  Pulses: Normal pulses  Pulmonary:      Effort: Pulmonary effort is normal       Breath sounds: Normal breath sounds  Abdominal:      General: Abdomen is flat  Bowel sounds are normal    Musculoskeletal:         General: Normal range of motion  Cervical back: Normal range of motion and neck supple  Skin:     General: Skin is warm and dry  Neurological:      General: No focal deficit present  Mental Status: She is alert and oriented to person, place, and time     Psychiatric:         Mood and Affect: Mood normal          Behavior: Behavior normal          Thought Content:  Thought content normal          Judgment: Judgment normal             Medications:    Current Facility-Administered Medications:   •  acetaminophen (TYLENOL) tablet 975 mg, 975 mg, Oral, Q6H PRN, Cecilio Martinez PA-C, 975 mg at 09/26/22 2315  •  ALPRAZolam Wendy Howie) tablet 0 25 mg, 0 25 mg, Oral, TID PRN, Tommy Juarez DO, 0 25 mg at 09/24/22 1616  •  alteplase (CATHFLO) injection 2 mg, 2 mg, Intracatheter, Once, Larry Sevilla PA-C  •  bisacodyl (DULCOLAX) rectal suppository 10 mg, 10 mg, Rectal, Daily PRN, Melissa Olvera MD  •  bisacodyl (DULCOLAX) rectal suppository 10 mg, 10 mg, Rectal, Once, Natividad Hsu MD  •  calcium gluconate 1 g in sodium chloride 0 9% 50 mL (premix), 1 g, Intravenous, Once, TOLU Stanton  •  clonazePAM (KlonoPIN) tablet 3 mg, 3 mg, Oral, HS, Jovita Mcdermott, DO, 3 mg at 10/04/22 2202  •  dextrose 50 % IV solution 25 mL, 25 mL, Intravenous, Once, TOLU Stanton  •  folic acid (FOLVITE) tablet 1 mg, 1 mg, Oral, Daily, Jovita Mcdermott, DO, 1 mg at 10/05/22 1034  •  guaiFENesin (MUCINEX) 12 hr tablet 600 mg, 600 mg, Oral, Q12H [address], Aurelia Encinas PA-C, 600 mg at 10/05/22 1033  •  heparin (porcine) subcutaneous injection 5,000 Units, 5,000 Units, Subcutaneous, Q8H [address], Tommy Juarez DO, 5,000 Units at 10/05/22 0617  •  insulin regular (HumuLIN R,NovoLIN R) injection 10 Units, 10 Units, Intravenous, Once, TOLU Stanton  •  levothyroxine tablet 112 mcg, 112 mcg, Oral, Q24H, Oluwatomisin Prieto Quinn MD, 112 mcg at 10/04/22 1756  •  magnesium hydroxide (MILK OF MAGNESIA) oral suspension 30 mL, 30 mL, Oral, Daily PRN, Melissa Olvera MD, 30 mL at 09/15/22 1421  •  metoclopramide (REGLAN) injection 10 mg, 10 mg, Intravenous, Q6H PRN, Melissa Olvera MD  •  midodrine (PROAMATINE) tablet 10 mg, 10 mg, Oral, TID Jud MEDRANO CRNP, 10 mg at 10/05/22 0617  •  ondansetron (ZOFRAN) injection 4 mg, 4 mg, Intravenous, Q6H PRN, Jovita Mcdermott, DO, 4 mg at 09/18/22 1339  •  oxyCODONE (ROXICODONE) IR tablet 5 mg, 5 mg, Oral, Q6H, Vivien Tidwell MD, 5 mg at 10/05/22 1033  •  pantoprazole (PROTONIX) EC tablet 40 mg, 40 mg, Oral, BID AC, Kaylan Arroyo, DO, 40 mg at 10/05/22 3903  •  polyethylene glycol (MIRALAX) packet 17 g, 17 g, Per PEG Tube, BID, Jovita Mcdermott, DO, 17 g at 10/04/22 2204  •  senna (SENOKOT) tablet 17 2 mg, 2 tablet, Oral, HS, Senthil Huang MD, 17 2 mg at 10/04/22 2203  •  sodium bicarbonate 8 4 % injection 50 mEq, 50 mEq, Intravenous, Once, Tennille Rumble, CRNP  •  sodium chloride 0 9 % bolus 500 mL, 500 mL, Intravenous, Once, Tennille Rumble, CRNP  •  sodium chloride tablet 2 g, 2 g, Oral, TID, Sharlot John, DO, 2 g at 10/05/22 1033  •  thiamine tablet 100 mg, 100 mg, Oral, Daily, Jovita Mcdermott, DO, 100 mg at 10/05/22 1033  •  traZODone (DESYREL) tablet 150 mg, 150 mg, Oral, HS, Jovita Mcdermott, DO, 150 mg at 10/04/22 2203    Laboratory Results:  Results from last 7 days   Lab Units 10/05/22  1040 10/04/22  1124 10/01/22  0554 09/30/22  1657 09/30/22  1357 09/29/22  1139 09/28/22  1512   WBC Thousand/uL 14 97* 13 50* 13 50* 15 34*  --  13 08* 13 42*   HEMOGLOBIN g/dL 8 6* 8 4* 7 9* 7 8*  --  7 5* 7 8*   HEMATOCRIT % 27 6* 26 6* 25 5* 25 2*  --  23 8* 25 1*   PLATELETS Thousands/uL 574* 582* 572* 576*  --  516* 492*   SODIUM mmol/L 129*  --  132*  --  130* 133* 132*   POTASSIUM mmol/L 5 9*  --  4 3  --  4 6 4 5 4 7   CHLORIDE mmol/L 101  --  103  --  104 103 102   CO2 mmol/L 25  --  22  --  19* 23 24   BUN mg/dL 33*  --  20  --  19 18 23   CREATININE mg/dL 1 58*  --  1 58*  --  1 61* 1 55* 1 57*   CALCIUM mg/dL 8 3  --  7 9*  --  8 1* 8 2* 8 1*   MAGNESIUM mg/dL  --  2 4  --   --   --   --   --

## 2022-10-05 NOTE — QUICK NOTE
As patient refusing bloodwork, nephrology will sign off  Renal function stable as of last bloodwork  sNa stable as of last bloodwork in 130s  Call/text with questions

## 2022-10-05 NOTE — PROGRESS NOTES
[address]  Progress Note - Jie Brand 1945, 76 y o  female MRN: [id number]  Unit/Bed#: [facility] 323-01 Encounter: [id number]  Primary Care Provider: Dorcas Mirza MD   Date and time admitted to hospital: 8/30/2022 11:49 PM    * Sepsis [facility])  Assessment & Plan  · Initially met criteria with fever, tachypnea, tachycardia, and leukocytosis on admission  Sepsis secondary to recurrent bacteremia and PICC line infections  · PICC line has been removed and patient had PEG tube placed for nutritional support  · Finished full course of ertapenem for ESBL UTI    Hyperkalemia  Assessment & Plan  · Potassium 5 9 this AM  · Nephrology following - giving calcium gluconate, D50/insulin, sodium bicarb  · Repeat BMP this evening per Nephro    Bacteremia  Assessment & Plan  · Recent hx of staph epi bacteremia in 7/2022, presumed due to picc line  · Recurrence 1/2 set staph epi, 2nd set without growth  Was treated with intravenous vancomycin for presumed line infection  · S/p TAVIA (9/9) no evidence of vegetation  · Blood cultures had been negative however repeated again due to isolated fever which are negative to date  · PICC and TPN discontinued, now has PEG tube  · Monitor off abx    Diarrhea  Assessment & Plan  · Recent history of C diff colitis  · Treated with prophylactic dose of p o   Vancomycin while on antibiotics     NOAH (acute kidney injury) ([facility] [address] )  Assessment & Plan  · Renal function appears to have stabilized around creatinine of 1 5  · Creatinine up-trending today - 500cc NS bolus today per Nephrology recs  · Possibly new baseline per Nephrology  · Monitor BMP    Hyponatremia  Assessment & Plan  · Sodium 129 today  · Nephrology following   · Continue sodium chloride tabs 2 g q 8 and getting NS bolus      Acute encephalopathy  Assessment & Plan  · Patient has waxing waning mental status   · Cognition and mental status issues multifactorial in nature stemming from prolonged illness, numerous hospitalizations, chronic malnutrition, and chronic/recurrent infections  · Monitor clinically  · Delirium precautions  · Neuropsych consulted for capacity eval      Urinary retention  Assessment & Plan  · Has required intermittent catheterizations however now voiding spontaneously  · Continue retention protocol    Abnormal CT scan  Assessment & Plan  · Noted on CT C/A/P 9/26  - Focal thickening of the lower portion of the endometrium  While this is not expected in a patient of this age, this appears stable from 2016 suggesting a benign etiology  If there is clinical concern, follow-up ultrasound is recommended  - Small left upper lobe nodules, one of which is new from March 2022  Given the history of smoking, a follow-up chest CT is recommended in 12 months  · This was d/w sister and brother POA over phone by prior provider 9/28    Acute on chronic anemia  Assessment & Plan  · 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  · S/p recent EGD in 7/22 revealing: Residual marginal ulcer of the gastrojejunostomy anastomosis with improved size  · S/p EGD 9/2 revealing: Large, cratered ulcer in the gastrojejunal anastomosis   · Status post 1 unit PRBCs this admission  · Continue PPI b i d  Ambulatory dysfunction  Assessment & Plan  · Will need rehab on discharge      Goals of care, counseling/discussion  Assessment & Plan  · Evaluated by palliative care  Wants to continue medical directed treatments with limits of DNR/DNI  · Continue current pain management regimen with scheduled oxycodone and additional p r n  Doses  Should follow-up with palliative Care as outpatient  Hypotension  Assessment & Plan  · Continue midodrine 10 mg t i d     H/O bariatric surgery - bypass  Assessment & Plan  · History of Savage-en-Y gastric bypass  Has had significant issues over the last 6 months or so with chronic malnutrition and an anastomotic ulcerations    When last seen by bariatric surgery, it was recommended to continue on prolonged TPN for nutritional optimization with possible revision of the Savage-en-Y in the future  · Given her recurrence PICC line infections and bacteremia, TPN has been discontinued  · Continue tube feeds for nutritional optimization  · Will need significant improvement before consideration can be given to further surgical intervention      Moderate protein-calorie malnutrition ([facility] [address] )  Assessment & Plan  Malnutrition Findings:   Adult Malnutrition type: Chronic illness  Adult Degree of Malnutrition: Other severe protein calorie malnutrition  Malnutrition Characteristics: Weight loss, Muscle loss       360 Statement: Severe/Chronic malnutrition r/t condition, as evidenced by 4 8% wt loss x < 1 week (9/2/22: 125#, 9/5/22: 119#), and severe muscle mass depletion (temples/clavicle)  Treated with liberalized diet and nutrition supplements, possibly nutrition support pending goals of care    BMI Findings: Body mass index is 18 28 kg/m²  · Continue tube feeds    Hypothyroidism  Assessment & Plan  · TSH elevated and fT4 low  · Endocrinology consulted - levothyroxine timing was adjusted to 2pm per Endocrinology recs (4 hours after tube feeds have stopped running)  · Repeat TFTs in 1 week per Endocrine        VTE Pharmacologic Prophylaxis: VTE Score: 3 Moderate Risk (Score 3-4) - Pharmacological DVT Prophylaxis Ordered: heparin  Patient Centered Rounds: I performed bedside rounds with nursing staff today  Discussions with Specialists or Other Care Team Provider: Nephrologist and      Education and Discussions with Family / Patient: patient; I attempted to call the patient's brother, Galo Saha, however no one answered  Time Spent for Care: 20 minutes  More than 50% of total time spent on counseling and coordination of care as described above  Current Length of Stay: 35 day(s)  Current Patient Status: Inpatient   Certification Statement:  The patient will continue to require additional inpatient hospital stay due to rising creatinine, hyperkalemia, hyponatremia  Discharge Plan: Anticipate discharge in 24-48 hrs to rehab facility  Code Status: Level 3 - DNAR and DNI    Subjective:   Ms Stacy Ramesh reports some discomfort at PEG tube site  Otherwise she denies complaint     Objective:     Vitals:   Temp (24hrs), Av 3 °F (36 8 °C), Min:98 1 °F (36 7 °C), Max:98 4 °F (36 9 °C)    Temp:  [98 1 °F (36 7 °C)-98 4 °F (36 9 °C)] 98 1 °F (36 7 °C)  HR:  [82-83] 83  Resp:  [16] 16  BP: ()/(47-56) 107/47  SpO2:  [93 %-96 %] 93 %  Body mass index is 18 28 kg/m²  Input and Output Summary (last 24 hours): Intake/Output Summary (Last 24 hours) at 10/5/2022 1607  Last data filed at 10/5/2022 0859  Gross per 24 hour   Intake --   Output 1200 ml   Net -1200 ml       Physical Exam:   Physical Exam  Vitals reviewed  Constitutional:       Comments: Patient seen sitting in bed, NAD, chronically ill appearing   Cardiovascular:      Rate and Rhythm: Normal rate and regular rhythm  Pulmonary:      Effort: Pulmonary effort is normal  No respiratory distress  Breath sounds: Normal breath sounds  Abdominal:      General: Bowel sounds are normal       Palpations: Abdomen is soft  Tenderness: There is no abdominal tenderness  Musculoskeletal:      Right lower leg: No edema  Left lower leg: No edema  Skin:     General: Skin is warm  Neurological:      Mental Status: She is alert        Comments: Oriented to person and year but not place   Psychiatric:         Mood and Affect: Mood normal          Behavior: Behavior normal            Additional Data:     Labs:  Results from last 7 days   Lab Units 10/05/22  1040 22  1657 22  1139   WBC Thousand/uL 14 97*   < > 13 08*   HEMOGLOBIN g/dL 8 6*   < > 7 5*   HEMATOCRIT % 27 6*   < > 23 8*   PLATELETS Thousands/uL 574*   < > 516*   NEUTROS PCT %  --   --  71   LYMPHS PCT %  --   --  11* MONOS PCT %  --   --  7   EOS PCT %  --   --  10*    < > = values in this interval not displayed  Results from last 7 days   Lab Units 10/05/22  1040   SODIUM mmol/L 129*   POTASSIUM mmol/L 5 9*   CHLORIDE mmol/L 101   CO2 mmol/L 25   BUN mg/dL 33*   CREATININE mg/dL 1 58*   ANION GAP mmol/L 3*   CALCIUM mg/dL 8 3   GLUCOSE RANDOM mg/dL 112         Results from last 7 days   Lab Units 10/05/22  0609 10/05/22  0205 10/04/22  1813 10/04/22  1254 10/03/22  2112 10/03/22  1824 10/03/22  1030 10/03/22  0117 10/02/22  1613 10/02/22  0613 10/01/22  1637 10/01/22  1111   POC GLUCOSE mg/dl 124 114 91 107 120 87 130 101 102 126 95 124               Lines/Drains:  Invasive Devices  Report    Drain  Duration           Gastrostomy/Enterostomy Percutaneous Endoscopic Gastrostomy (PEG) 20 Fr  LUQ 26 days                      Imaging: No pertinent imaging reviewed      Recent Cultures (last 7 days):         Last 24 Hours Medication List:   Current Facility-Administered Medications   Medication Dose Route Frequency Provider Last Rate   • acetaminophen  975 mg Oral Q6H PRN Jose Munguia PA-C     • ALPRAZolam  0 25 mg Oral TID PRN Gerhardt Grams, DO     • alteplase  2 mg Intracatheter Once Larry Sevilla PA-C     • bisacodyl  10 mg Rectal Daily PRN Isidra Lopez MD     • bisacodyl  10 mg Rectal Once Diaz Spear MD     • calcium gluconate  1 g Intravenous Once [place] Abdoul, CRNP     • clonazePAM  3 mg Oral HS Jovitakash Mcdermott DO     • dextrose  25 mL Intravenous Once [place] Mac, CRNP     • folic acid  1 mg Oral Daily Jovita Mcdermott DO     • guaiFENesin  600 mg Oral Q12H [address] [place] Munguia PA-C     • heparin (porcine)  5,000 Units Subcutaneous [facility] Kaylan Arroyo DO     • insulin regular  10 Units Intravenous Once [place] Mac, CRNP     • levothyroxine  112 mcg Oral Q24H [place]lu[place] Lola Michaels MD     • magnesium hydroxide  30 mL Oral Daily PRN Isidra Lopez MD     • metoclopramide  10 mg Intravenous Q6H PRN Lidia Campbell MD     • midodrine  10 mg Oral TID AC TOLU Orellana     • ondansetron  4 mg Intravenous Q6H PRN Rena Mcmahan DO     • oxyCODONE  5 mg Oral Q6H Lidia Campbell MD     • pantoprazole  40 mg Oral BID AC Kaylan Arroyo, DO     • polyethylene glycol  17 g Per PEG Tube BID Renadavid Mcmahan DO     • senna  2 tablet Oral HS Mark Norris MD     • sodium bicarbonate  50 mEq Intravenous Once TOLU Paula     • sodium chloride  500 mL Intravenous Once TOLU Paula     • sodium chloride  2 g Oral TID Jeff Weiner DO     • thiamine  100 mg Oral Daily Jovita Mcdermott DO     • traZODone  150 mg Oral HS Rena Mcmahan DO          Today, Patient Was Seen By: Henry Frederick    **Please Note: This note may have been constructed using a voice recognition system  **

## 2022-10-06 NOTE — PLAN OF CARE
Problem: Potential for Falls  Goal: Patient will remain free of falls  Description: INTERVENTIONS:  - Educate patient/family on patient safety including physical limitations  - Instruct patient to call for assistance with activity   - Consult OT/PT to assist with strengthening/mobility   - Keep Call bell within reach  - Keep bed low and locked with side rails adjusted as appropriate  - Keep care items and personal belongings within reach  - Initiate and maintain comfort rounds  - Make Fall Risk Sign visible to staff  - Apply yellow socks and bracelet for high fall risk patients  - Consider moving patient to room near nurses station  Outcome: Progressing     Problem: MOBILITY - ADULT  Goal: Maintain or return to baseline ADL function  Description: INTERVENTIONS:  -  Assess patient's ability to carry out ADLs; assess patient's baseline for ADL function and identify physical deficits which impact ability to perform ADLs (bathing, care of mouth/teeth, toileting, grooming, dressing, etc )  - Assess/evaluate cause of self-care deficits   - Assess range of motion  - Assess patient's mobility; develop plan if impaired  - Assess patient's need for assistive devices and provide as appropriate  - Encourage maximum independence but intervene and supervise when necessary  - Involve family in performance of ADLs  - Assess for home care needs following discharge   - Consider OT consult to assist with ADL evaluation and planning for discharge  - Provide patient education as appropriate  Outcome: Progressing     Problem: INFECTION - ADULT  Goal: Absence or prevention of progression during hospitalization  Description: INTERVENTIONS:  - Assess and monitor for signs and symptoms of infection  - Monitor lab/diagnostic results  - Monitor all insertion sites, i e  indwelling lines, tubes, and drains  - Monitor endotracheal if appropriate and nasal secretions for changes in amount and color  - [place] appropriate cooling/warming therapies per order  - Administer medications as ordered  - Instruct and encourage patient and family to use good hand hygiene technique  - Identify and instruct in appropriate isolation precautions for identified infection/condition  Outcome: Progressing     Problem: SAFETY ADULT  Goal: Patient will remain free of falls  Description: INTERVENTIONS:  - Educate patient/family on patient safety including physical limitations  - Instruct patient to call for assistance with activity   - Consult OT/PT to assist with strengthening/mobility   - Keep Call bell within reach  - Keep bed low and locked with side rails adjusted as appropriate  - Keep care items and personal belongings within reach  - Initiate and maintain comfort rounds  - Make Fall Risk Sign visible to staff  - Apply yellow socks and bracelet for high fall risk patients  - Consider moving patient to room near nurses station  Outcome: Progressing  Goal: Maintain or return to baseline ADL function  Description: INTERVENTIONS:  -  Assess patient's ability to carry out ADLs; assess patient's baseline for ADL function and identify physical deficits which impact ability to perform ADLs (bathing, care of mouth/teeth, toileting, grooming, dressing, etc )  - Assess/evaluate cause of self-care deficits   - Assess range of motion  - Assess patient's mobility; develop plan if impaired  - Assess patient's need for assistive devices and provide as appropriate  - Encourage maximum independence but intervene and supervise when necessary  - Involve family in performance of ADLs  - Assess for home care needs following discharge   - Consider OT consult to assist with ADL evaluation and planning for discharge  - Provide patient education as appropriate  Outcome: Progressing     Problem: DISCHARGE PLANNING  Goal: Discharge to home or other facility with appropriate resources  Description: INTERVENTIONS:  - Identify barriers to discharge w/patient and caregiver  - Arrange for needed discharge resources and transportation as appropriate  - Identify discharge learning needs (meds, wound care, etc )  - Arrange for interpretive services to assist at discharge as needed  - Refer to Case Management Department for coordinating discharge planning if the patient needs post-hospital services based on physician/advanced practitioner order or complex needs related to functional status, cognitive ability, or social support system  Outcome: Progressing     Problem: Knowledge Deficit  Goal: Patient/family/caregiver demonstrates understanding of disease process, treatment plan, medications, and discharge instructions  Description: Complete learning assessment and assess knowledge base  Interventions:  - Provide teaching at level of understanding  - Provide teaching via preferred learning methods  Outcome: Progressing        Problem: Nutrition/Hydration-ADULT  Goal: Nutrient/Hydration intake appropriate for improving, restoring or maintaining nutritional needs  Description: Monitor and assess patient's nutrition/hydration status for malnutrition  Collaborate with interdisciplinary team and initiate plan and interventions as ordered  Monitor patient's weight and dietary intake as ordered or per policy  Utilize nutrition screening tool and intervene as necessary  Determine patient's food preferences and provide high-protein, high-caloric foods as appropriate       INTERVENTIONS:  - Monitor oral intake, urinary output, labs, and treatment plans  - Assess nutrition and hydration status and recommend course of action  - Evaluate amount of meals eaten  - Assist patient with eating if necessary   - Allow adequate time for meals  - Recommend/ encourage appropriate diets, oral nutritional supplements, and vitamin/mineral supplements  - Order, calculate, and assess calorie counts as needed  - Recommend, monitor, and adjust tube feedings and TPN/PPN based on assessed needs  - Assess need for intravenous fluids  - Provide specific nutrition/hydration education as appropriate  - Include patient/family/caregiver in decisions related to nutrition  Outcome: Progressing

## 2022-10-06 NOTE — PROGRESS NOTES
[address] [facility]  Progress Note - Cayetano Blunt 1945, 76 y o  female MRN: [id number]  Unit/Bed#: [facility] 323-01 Encounter: [id number]  Primary Care Provider: Manju Silveira MD   Date and time admitted to hospital: 8/30/2022 11:49 PM    * Sepsis [facility])  Assessment & Plan  · Initially met criteria with fever, tachypnea, tachycardia, and leukocytosis on admission  Sepsis secondary to recurrent bacteremia and PICC line infections  · PICC line has been removed and patient had PEG tube placed for nutritional support  · Finished full course of ertapenem for ESBL UTI    Hyperkalemia  Assessment & Plan  · Follow up on a m  Labs  · Nephrology following - giving calcium gluconate, D50/insulin, sodium bicarb given October 5th  · Repeat BMP this evening per Nephro    Hypotension  Assessment & Plan  · Continue midodrine 10 mg t i d     Acute encephalopathy  Assessment & Plan  · Patient has waxing waning mental status   · Cognition and mental status issues multifactorial in nature stemming from prolonged illness, numerous hospitalizations, chronic malnutrition, and chronic/recurrent infections  · Monitor clinically  · Delirium precautions  · Neuropsych consulted for capacity eval      Goals of care, counseling/discussion  Assessment & Plan  · Evaluated by palliative care  Wants to continue medical directed treatments with limits of DNR/DNI  · Continue current pain management regimen with scheduled oxycodone and additional p r n  Doses  Should follow-up with palliative Care as outpatient  Acute on chronic anemia  Assessment & Plan  · 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  · S/p recent EGD in 7/22 revealing: Residual marginal ulcer of the gastrojejunostomy anastomosis with improved size  · S/p EGD 9/2 revealing: Large, cratered ulcer in the gastrojejunal anastomosis   · Status post 1 unit PRBCs this admission  · Continue PPI b i d       Bacteremia  Assessment & Plan  · Recent hx of staph epi bacteremia in 7/2022, presumed due to picc line  · Recurrence 1/2 set staph epi, 2nd set without growth  Was treated with intravenous vancomycin for presumed line infection  · S/p TAVIA (9/9) no evidence of vegetation  · Blood cultures had been negative however repeated again due to isolated fever which are negative to date  · PICC and TPN discontinued, now has PEG tube  · Monitor off abx    Diarrhea  Assessment & Plan  · Recent history of C diff colitis  · Treated with prophylactic dose of p o  Vancomycin while on antibiotics     Ambulatory dysfunction  Assessment & Plan  · Will need rehab on discharge      NOAH (acute kidney injury) ([facility] [address] )  Assessment & Plan  · Renal function appears to have stabilized around creatinine of 1 5  · Creatinine up-trending today - 500cc NS bolus today per Nephrology recs  · Possibly new baseline per Nephrology  · Monitor BMP    H/O bariatric surgery - bypass  Assessment & Plan  · History of Savage-en-Y gastric bypass  Has had significant issues over the last 6 months or so with chronic malnutrition and an anastomotic ulcerations  When last seen by bariatric surgery, it was recommended to continue on prolonged TPN for nutritional optimization with possible revision of the Savage-en-Y in the future  · Given her recurrence PICC line infections and bacteremia, TPN has been discontinued  · Continue tube feeds for nutritional optimization  · Will need significant improvement before consideration can be given to further surgical intervention      Abnormal CT scan  Assessment & Plan  · Noted on CT C/A/P 9/26  - Focal thickening of the lower portion of the endometrium  While this is not expected in a patient of this age, this appears stable from 2016 suggesting a benign etiology  If there is clinical concern, follow-up ultrasound is recommended  - Small left upper lobe nodules, one of which is new from March 2022    Given the history of smoking, a follow-up chest CT is recommended in 12 months  · This was d/w sister and brother POA over phone by prior provider 9/28    Hyponatremia  Assessment & Plan    · Nephrology following   · Continue sodium chloride tabs 2 g q 8 and getting NS bolus      Hypothyroidism  Assessment & Plan  · TSH elevated and fT4 low  · Endocrinology consulted - levothyroxine timing was adjusted to 2pm per Endocrinology recs (4 hours after tube feeds have stopped running)  · Repeat TFTs in 1 week per Endocrine        VTE Pharmacologic Prophylaxis:   Pharmacologic: Heparin  Mechanical VTE Prophylaxis in Place: No    Patient Centered Rounds: I have performed bedside rounds with nursing staff today  Time Spent for Care: 15 minutes  More than 50% of total time spent on counseling and coordination of care as described above  Current Length of Stay: 36 day(s)    Current Patient Status: Inpatient     Code Status: Level 3 - DNAR and DNI      Subjective:   No acute distress    Objective:     Vitals:   No data recorded  Body mass index is 18 28 kg/m²  Input and Output Summary (last 24 hours): Intake/Output Summary (Last 24 hours) at 10/6/2022 1007  Last data filed at 10/6/2022 0634  Gross per 24 hour   Intake 1310 ml   Output 1100 ml   Net 210 ml       Physical Exam:     Physical Exam  Constitutional:       Appearance: Normal appearance  HENT:      Head: Normocephalic and atraumatic  Eyes:      Extraocular Movements: Extraocular movements intact  Pupils: Pupils are equal, round, and reactive to light  Cardiovascular:      Rate and Rhythm: Normal rate and regular rhythm  Pulmonary:      Effort: Pulmonary effort is normal       Breath sounds: Normal breath sounds  Abdominal:      General: Abdomen is flat  There is no distension  Palpations: Abdomen is soft  There is no mass  Neurological:      General: No focal deficit present  Mental Status: She is alert and oriented to person, place, and time     Psychiatric: Mood and Affect: Mood normal          Behavior: Behavior normal          Additional Data:     Labs:    Results from last 7 days   Lab Units 10/05/22  1040 09/30/22  1657 09/29/22  1139   WBC Thousand/uL 14 97*   < > 13 08*   HEMOGLOBIN g/dL 8 6*   < > 7 5*   HEMATOCRIT % 27 6*   < > 23 8*   PLATELETS Thousands/uL 574*   < > 516*   NEUTROS PCT %  --   --  71   LYMPHS PCT %  --   --  11*   MONOS PCT %  --   --  7   EOS PCT %  --   --  10*    < > = values in this interval not displayed  Results from last 7 days   Lab Units 10/05/22  2242   POTASSIUM mmol/L 5 5*   CHLORIDE mmol/L 103   CO2 mmol/L 28   BUN mg/dL 30*   CREATININE mg/dL 1 58*   CALCIUM mg/dL 8 8           * I Have Reviewed All Lab Data Listed Above  * Additional Pertinent Lab Tests Reviewed:  All Labs Within Last 24 Hours Reviewed      Recent Cultures (last 7 days):           Last 24 Hours Medication List:   Current Facility-Administered Medications   Medication Dose Route Frequency Provider Last Rate   • acetaminophen  975 mg Oral Q6H PRN Jose Palmer PA-C     • ALPRAZolam  0 25 mg Oral TID PRN Rahul Urias DO     • alteplase  2 mg Intracatheter Once Maxine Siemens, PA-C     • bisacodyl  10 mg Rectal Daily PRN Nan Teixeira MD     • bisacodyl  10 mg Rectal Once David Curtis MD     • clonazePAM  3 mg Oral HS Jovita Mcdermott DO     • folic acid  1 mg Oral Daily Jovita Mcdermott DO     • guaiFENesin  600 mg Oral Q12H [facility] Jose Palmer PA-C     • heparin (porcine)  5,000 Units Subcutaneous [facility] Rahul Urias DO     • levothyroxine  112 mcg Oral Q24H Oluwatomisin Prerna Swift MD     • metoclopramide  10 mg Intravenous Q6H PRN Nan Teixeira MD     • midodrine  10 mg Oral TID AC TOLU Orellana     • ondansetron  4 mg Intravenous Q6H PRN Cindy Foley DO     • oxyCODONE  5 mg Oral Q6H Nan Teixeira MD     • pantoprazole  40 mg Oral BID AC Kaylan Arroyo DO     • polyethylene glycol  17 g Per PEG Tube BID Zeenat Waqas, DO     • senna  2 tablet Oral HS Yen Jeff MD     • sodium chloride  2 g Oral TID Tiffany Lu DO     • thiamine  100 mg Oral Daily Jovita Mcdermott DO     • traZODone  150 mg Oral HS Zeenat Alan, DO          Today, Patient Was Seen By: Reji Marte    ** Please Note: Dictation voice to text software may have been used in the creation of this document   **

## 2022-10-06 NOTE — ASSESSMENT & PLAN NOTE
· Follow up on a m   Labs  · Nephrology following - giving calcium gluconate, D50/insulin, sodium bicarb given October 5th  · Repeat BMP this evening per Nephro

## 2022-10-06 NOTE — PROGRESS NOTES
Progress note- Nephrology   Pepito Simmons 76 y o  female MRN: [id number]  Unit/Bed#: [facility] 323-01 Encounter: [id number]    ASSESSMENT and PLAN:  Elevated creatinine  Etiology: Suspect prerenal in the setting of GI loss with diarrhea, recurrent sepsis, anemia, and relative hypotension   Assessment:  · After review medical records through [facility] everywhere baseline creatinine 0 8-1 2 dating back to 11/2020  · Admission creatinine 1 35 on 08/31/2022-initially resolved with IV fluids   · Creatinine continues to fluctuate between 1 3-1 6 likely volume depending  · Current creatinine 1 54 appears stable  · Continue midodrine 10 mg t i d   · S/p IVF bolus 10/5/2022  · Check BMp in am    Hypotension  Assessment and Plan:  · Current blood pressure remains stable  · Current medications:  Midodrine 10 mg t i d   · Maximize hemodynamics to maintain MAP >65  · Avoid hypotension or fluctuations in blood pressure  · Will continue to trend  · Consider compression stockings     Hyponatremia  Assessment and Plan:  · Suspect component of malabsorption in the setting of prior gastric bypass, ongoing diarrhea, poor solute intake, possible SIADH with ongoing pain causing increased in ADH  · Currently on sodium salt tablets 2 gram TID-however intermittently only taking 2 times a day  · On tube feedings nightly from 6:00 p m  to 10:00 a m  with 50 cc water flushes every 6 hours  · Sodium initially improved 134 post IV fluid bolus  · Current sodium 130  · Consider pressure IV fluids if continues to worsen     Hyperkalemia:  Assessment and plan  · Status post medical treatment on 10/05/2022 with potassium 5 9  · BP was 5 5 and currently 5 6  · Will add sodium bicarbonate 650 mg b i d   · Continue trend closely  · Check BMP in a m      Acute on chronic Anemia:   Assessment and Plan:  · Recent hemoglobin 8 6  · Has received transfusion  · Previously evaluated by GI  · 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  · Statu post EGD in July which revealed Residual marginal ulcer of the gastrojejunostomy anastomosis with improved size  · Management per primary team:        Review of Systems  Patient seen and examined at bedside  Review of Systems   Constitutional: Negative  Negative for activity change, appetite change, chills, diaphoresis, fatigue and fever  HENT: Negative  Negative for congestion and facial swelling  Respiratory: Negative  Cardiovascular: Negative  Gastrointestinal: Negative  Endocrine: Negative  Genitourinary: Negative  Musculoskeletal: Negative  Skin: Negative  Allergic/Immunologic: Negative  Neurological: Negative  Hematological: Negative  Psychiatric/Behavioral: Negative  Physical Exam:  Current Weight: Weight - Scale: 48 3 kg (106 lb 7 7 oz)  Vitals:    10/04/22 1436 10/04/22 2336 10/05/22 0756 10/06/22 1222   BP: 104/53 97/56 (!) 107/47 112/57   BP Location: Right arm Right arm Right arm Right arm   Pulse: 80 82 83 87   Resp: 16 16 16 20   Temp: 98 7 °F (37 1 °C) 98 4 °F (36 9 °C) 98 1 °F (36 7 °C) 97 5 °F (36 4 °C)   TempSrc: Oral Axillary Oral Oral   SpO2: 91% 96% 93% 94%   Weight:       Height:           Physical Exam  Vitals and nursing note reviewed  Constitutional:       Comments: Cachectic, no acute distress, lying flat in   HENT:      Head: Normocephalic and atraumatic  Eyes:      Extraocular Movements: Extraocular movements intact  Conjunctiva/sclera: Conjunctivae normal    Cardiovascular:      Rate and Rhythm: Normal rate and regular rhythm  Pulses: Normal pulses  Heart sounds: Normal heart sounds  Pulmonary:      Effort: Pulmonary effort is normal       Breath sounds: Normal breath sounds  Abdominal:      General: Bowel sounds are normal       Palpations: Abdomen is soft  Musculoskeletal:         General: Normal range of motion  Cervical back: Normal range of motion and neck supple     Skin:     General: Skin is warm and dry  Neurological:      General: No focal deficit present  Mental Status: She is alert  Psychiatric:         Mood and Affect: Mood normal          Behavior: Behavior normal          Thought Content:  Thought content normal          Judgment: Judgment normal             Medications:    Current Facility-Administered Medications:   •  acetaminophen (TYLENOL) tablet 975 mg, 975 mg, Oral, Q6H PRN, Vidal Miles PA-C, 975 mg at 09/26/22 2315  •  ALPRAZolam Corinda Degree) tablet 0 25 mg, 0 25 mg, Oral, TID PRN, Valeria Huang DO, 0 25 mg at 09/24/22 1616  •  alteplase (CATHFLO) injection 2 mg, 2 mg, Intracatheter, Once, Larry Sevilla PA-C  •  bisacodyl (DULCOLAX) rectal suppository 10 mg, 10 mg, Rectal, Daily PRN, Aaron Sexton MD  •  bisacodyl (DULCOLAX) rectal suppository 10 mg, 10 mg, Rectal, Once, Jasmyne Montano MD  •  clonazePAM (KlonoPIN) tablet 3 mg, 3 mg, Oral, HS, Jovita Mcdermott DO, 3 mg at 21/33/08 7864  •  folic acid (FOLVITE) tablet 1 mg, 1 mg, Oral, Daily, Jovita Mcdermott DO, 1 mg at 10/06/22 1058  •  guaiFENesin (MUCINEX) 12 hr tablet 600 mg, 600 mg, Oral, Q12H [facility], Aurelia Encinas PA-C, 600 mg at 10/06/22 1058  •  heparin (porcine) subcutaneous injection 5,000 Units, 5,000 Units, Subcutaneous, Q8H [facility], Valeria Huang DO, 5,000 Units at 10/06/22 2997  •  levothyroxine tablet 112 mcg, 112 mcg, Oral, Q24H, Oluwatomisin Germán Tim MD, 112 mcg at 10/05/22 1750  •  metoclopramide (REGLAN) injection 10 mg, 10 mg, Intravenous, Q6H PRN, Aaron Sexton MD  •  midodrine (PROAMATINE) tablet 10 mg, 10 mg, Oral, TID ACJud CRNP, 10 mg at 10/06/22 1058  •  ondansetron (ZOFRAN) injection 4 mg, 4 mg, Intravenous, Q6H PRN, Jovita Mcdermott DO, 4 mg at 09/18/22 1339  •  oxyCODONE (ROXICODONE) IR tablet 5 mg, 5 mg, Oral, Q6H, Aaron Sexton MD, 5 mg at 10/06/22 1058  •  pantoprazole (PROTONIX) EC tablet 40 mg, 40 mg, Oral, BID AC, Kaylan Arroyo DO, 40 mg at 10/06/22 0620  •  polyethylene glycol (MIRALAX) packet 17 g, 17 g, Per PEG Tube, BID, Jovita Mcdermott, DO, 17 g at 10/04/22 2204  •  senna (SENOKOT) tablet 17 2 mg, 2 tablet, Oral, HS, Colon MD Sal, 17 2 mg at 10/05/22 2117  •  sodium chloride tablet 2 g, 2 g, Oral, TID, Shararvind John, DO, 2 g at 10/06/22 1058  •  thiamine tablet 100 mg, 100 mg, Oral, Daily, Jovita Mcdermott, DO, 100 mg at 10/06/22 1058  •  traZODone (DESYREL) tablet 150 mg, 150 mg, Oral, HS, Jovita Mcdermott, DO, 150 mg at 10/05/22 2117    Laboratory Results:  Results from last 7 days   Lab Units 10/06/22  1128 10/05/22  2242 10/05/22  1040 10/04/22  1124 10/01/22  0554 09/30/22  1657 09/30/22  1357   WBC Thousand/uL  --   --  14 97* 13 50* 13 50* 15 34*  --    HEMOGLOBIN g/dL  --   --  8 6* 8 4* 7 9* 7 8*  --    HEMATOCRIT %  --   --  27 6* 26 6* 25 5* 25 2*  --    PLATELETS Thousands/uL  --   --  574* 582* 572* 576*  --    SODIUM mmol/L 130* 134* 129*  --  132*  --  130*   POTASSIUM mmol/L 5 6* 5 5* 5 9*  --  4 3  --  4 6   CHLORIDE mmol/L 102 103 101  --  103  --  104   CO2 mmol/L 23 28 25  --  22  --  19*   BUN mg/dL 30* 30* 33*  --  20  --  19   CREATININE mg/dL 1 54* 1 58* 1 58*  --  1 58*  --  1 61*   CALCIUM mg/dL 9 2 8 8 8 3  --  7 9*  --  8 1*   MAGNESIUM mg/dL  --   --   --  2 4  --   --   --

## 2022-10-07 NOTE — PROGRESS NOTES
Progress Note - Nephrology   Tacho Cuello 76 y o  female MRN: [id number]  Unit/Bed#: [facility] 323-01 Encounter: [id number]      Assessment / Plan:  1  Elevated sCr in the setting of recent sepsis with UTI - b/l sCr 0 8-1 2, now stable 1 5-1 6  Suspect possible NOAH vs new baseline in light of low systolic BP readings  -monitor BMP     2  Hyponatremia likely due to poor solute intake with gastric bypass - sNa has improved to 134, monitor BMP on salt tab 2 g t i d      3  Hypotension - continue midodrine 10mg po TID, am cortisol 11 5, some improvement with IVF     4  Hyperkalemia - K 5 6, remains elevated, ensure no hemolysis by obtain bloodwork without tourniquet  Agree with adding sodium bicarbonate  Change Jevity to Nepro  Pleasure feeds should remain low potassium diet  Subjective: The patient denies chest pain or shortness for breath  No complaints  Objective:     Vitals: Blood pressure (!) 106/46, pulse 92, temperature (!) 97 4 °F (36 3 °C), temperature source Oral, resp  rate 20, height 5' 4" (1 626 m), weight 48 3 kg (106 lb 7 7 oz), SpO2 91 %  ,Body mass index is 18 28 kg/m²  Temp (24hrs), Av 1 °F (37 8 °C), Min:97 4 °F (36 3 °C), Max:101 6 °F (38 7 °C)      Weight (last 2 days)     None            Intake/Output Summary (Last 24 hours) at 10/7/2022 1419  Last data filed at 10/7/2022 1020  Gross per 24 hour   Intake 1658 ml   Output 551 ml   Net 1107 ml     I/O last 24 hours: In: 5 [NG/GT:1798; Feedings:3022]  Out: 551 [Urine:550; Stool:1]        Physical Exam:   Physical Exam  Vitals and nursing note reviewed  Constitutional:       General: She is not in acute distress  Appearance: She is well-developed  She is not diaphoretic  Comments: cachectic   HENT:      Head: Normocephalic and atraumatic  Mouth/Throat:      Pharynx: No oropharyngeal exudate  Eyes:      General: No scleral icterus  Right eye: No discharge  Left eye: No discharge     Neck: Thyroid: No thyromegaly  Cardiovascular:      Rate and Rhythm: Normal rate and regular rhythm  Heart sounds: No murmur heard  Pulmonary:      Effort: Pulmonary effort is normal  No respiratory distress  Breath sounds: Normal breath sounds  No wheezing  Abdominal:      General: Bowel sounds are normal  There is no distension  Palpations: Abdomen is soft  Comments: +PEG tube   Genitourinary:     Comments: ezequiel  Musculoskeletal:         General: No swelling  Cervical back: Normal range of motion and neck supple  Skin:     General: Skin is warm and dry  Findings: No rash  Neurological:      General: No focal deficit present  Mental Status: She is alert  Motor: No abnormal muscle tone        Comments: awake   Psychiatric:         Mood and Affect: Mood normal          Behavior: Behavior normal          Invasive Devices  Report    Peripheral Intravenous Line  Duration           Peripheral IV 10/06/22 Right;Ventral (anterior) Forearm <1 day          Drain  Duration           Gastrostomy/Enterostomy Percutaneous Endoscopic Gastrostomy (PEG) 20 Fr  LUQ 28 days    External Urinary Catheter 1 day                Medications:    Scheduled Meds:  Current Facility-Administered Medications   Medication Dose Route Frequency Provider Last Rate   • acetaminophen  975 mg Oral Q6H PRN Mehrdad Marvin PA-C     • ALPRAZolam  0 25 mg Oral TID PRN Marquita Spatz, DO     • alteplase  2 mg Intracatheter Once Larry Sevilla PA-C     • bisacodyl  10 mg Rectal Daily PRN Kimberly Wright MD     • bisacodyl  10 mg Rectal Once Lou Huffman MD     • clonazePAM  3 mg Oral HS Jovitakash Mcdermott DO     • folic acid  1 mg Oral Daily Jovitakash Mcdermott DO     • guaiFENesin  600 mg Oral Q12H [address] Mehrdad Marvin PA-C     • heparin (porcine)  5,000 Units Subcutaneous [facility] Marquita Spatz, DO     • levothyroxine  112 mcg Oral Q24H Oluwatomipedro luis Sarmiento MD     • metoclopramide  10 mg Intravenous Q6H PRN Kimberly Wright MD     • midodrine  10 mg Oral TID AC TOLU Orellana     • ondansetron  4 mg Intravenous Q6H PRN Helena Hernández DO     • oxyCODONE  5 mg Oral Q6H Kimberly Wright MD     • pantoprazole  40 mg Oral BID AC Kaylan Arroyo, DO     • polyethylene glycol  17 g Per PEG Tube BID Helena Hernández DO     • senna  2 tablet Oral HS Kristie Butler MD     • sodium bicarbonate  650 mg Oral BID after meals TOLU Crespo     • sodium chloride  2 g Oral TID Clementine Sanchez DO     • thiamine  100 mg Oral Daily Jovita Mcdermott DO     • traZODone  150 mg Oral HS Jovita Mcdermott DO     • vancomycin  500 mg Intravenous Q24H Mark Up DO         PRN Meds: •  acetaminophen  •  ALPRAZolam  •  bisacodyl  •  metoclopramide  •  ondansetron    Continuous Infusions:         LAB RESULTS:      Results from last 7 days   Lab Units 10/07/22  0554 10/06/22  2241 10/06/22  1128 10/05/22  2242 10/05/22  1040 10/04/22  1124 10/01/22  0554 09/30/22  1657   WBC Thousand/uL 14 20* 16 57*  --   --  14 97* 13 50* 13 50* 15 34*   HEMOGLOBIN g/dL 7 3* 7 4*  --   --  8 6* 8 4* 7 9* 7 8*   HEMATOCRIT % 23 9* 23 9*  --   --  27 6* 26 6* 25 5* 25 2*   PLATELETS Thousands/uL 539* 558*  --   --  574* 582* 572* 576*   NEUTROS PCT % 64  --   --   --   --   --   --   --    LYMPHS PCT % 16  --   --   --   --   --   --   --    MONOS PCT % 7  --   --   --   --   --   --   --    EOS PCT % 11*  --   --   --   --   --   --   --    POTASSIUM mmol/L 5 6*  --  5 6* 5 5* 5 9*  --  4 3  --    CHLORIDE mmol/L 104  --  102 103 101  --  103  --    CO2 mmol/L 24  --  23 28 25  --  22  --    BUN mg/dL 30*  --  30* 30* 33*  --  20  --    CREATININE mg/dL 1 48*  --  1 54* 1 58* 1 58*  --  1 58*  --    CALCIUM mg/dL 8 4  --  9 2 8 8 8 3  --  7 9*  --    MAGNESIUM mg/dL  --   --   --   --   --  2 4  --   --        CUTURES:  Lab Results   Component Value Date    BLOODCX No Growth After 5 Days  09/26/2022    BLOODCX No Growth After 5 Days   09/26/2022 BLOODCX No Growth After 5 Days  09/23/2022    BLOODCX No Growth After 5 Days  09/23/2022    BLOODCX No Growth After 5 Days  09/02/2022    BLOODCX No Growth After 5 Days  09/02/2022    BLOODCX No Growth After 5 Days  08/31/2022    BLOODCX Staphylococcus epidermidis (A) 08/31/2022    BLOODCX Staphylococcus pettenkoferi (A) 08/31/2022    URINECX >100,000 cfu/ml Proteus vulgaris (A) 09/23/2022    URINECX >100,000 cfu/ml Citrobacter freundii (A) 09/23/2022    URINECX >100,000 cfu/ml Klebsiella oxytoca ESBL (A) 09/23/2022    URINECX >100,000 cfu/ml Morganella morganii (A) 09/23/2022    URINECX (A) 08/31/2022     80,000-89,000 cfu/ml - 2 strains Gram Negative Jerome Enteric Like    URINECX 40,000-49,000 cfu/ml Proteus species (A) 08/31/2022    URINECX 40,000-49,000 cfu/ml Enterococcus species (A) 08/31/2022                 Portions of the record may have been created with voice recognition software  Occasional wrong word or "sound a like" substitutions may have occurred due to the inherent limitations of voice recognition software  Read the chart carefully and recognize, using context, where substitutions have occurred  If you have any questions, please contact the dictating provider

## 2022-10-07 NOTE — PROGRESS NOTES
Vancomycin Assessment    Jada Orellana is a 76 y o  female who is currently receiving vancomycin  for bacteremia     Relevant clinical data and objective history reviewed:  Creatinine   Date Value Ref Range Status   10/07/2022 1 48 (H) 0 60 - 1 30 mg/dL Final     Comment:     Standardized to IDMS reference method   10/06/2022 1 54 (H) 0 60 - 1 30 mg/dL Final     Comment:     Standardized to IDMS reference method   10/05/2022 1 58 (H) 0 60 - 1 30 mg/dL Final     Comment:     Standardized to IDMS reference method     Vancomycin Rm   Date Value Ref Range Status   07/27/2022 16 0 10 0 - 20 0 ug/mL Final     BP (!) 106/46 (BP Location: Right arm)   Pulse 92   Temp (!) 97 4 °F (36 3 °C) (Oral)   Resp 20   Ht 5' 4" (1 626 m)   Wt 48 3 kg (106 lb 7 7 oz)   SpO2 91%   BMI 18 28 kg/m²   I/O last 3 completed shifts: In: 6277 [P O :500; NG/GT:1766; Feedings:2446]  Out: 1550 [Urine:1550]  Lab Results   Component Value Date/Time    BUN 30 (H) 10/07/2022 05:54 AM    BUN 17 11/14/2020 10:02 AM    WBC 14 20 (H) 10/07/2022 05:54 AM    HGB 7 3 (L) 10/07/2022 05:54 AM    HCT 23 9 (L) 10/07/2022 05:54 AM     (H) 10/07/2022 05:54 AM     (H) 10/07/2022 05:54 AM     Temp Readings from Last 3 Encounters:   10/06/22 (!) 97 4 °F (36 3 °C) (Oral)   08/09/22 99 3 °F (37 4 °C) (Oral)   07/20/22 98 4 °F (36 9 °C) (Temporal)     Vancomycin Days of Therapy: 1    Assessment/Plan  The patient is currently on vancomycin utilizing scheduled dosing based on actual body weight  Baseline risks associated with therapy include: pre-existing renal impairment and advanced age  The patient is currently receiving 500mg q24hrs and is clinically appropriate and dose will be continued  Pharmacy will also follow closely for s/sx of nephrotoxicity, infusion reactions, and appropriateness of therapy  BMP and CBC will be ordered per protocol    Plan for trough as patient approaches steady state, prior to the 4th  dose at approximately 0930 on 10/10  Due to infection severity, will target a trough of 15-20 (appropriate for most indications)   Pharmacy will continue to follow the patient’s culture results and clinical progress daily       Monitor renal function    Nini Corral, Pharmacist

## 2022-10-07 NOTE — ASSESSMENT & PLAN NOTE
· Follow up on a m   Labs  · Nephrology following - giving calcium gluconate, D50/insulin, sodium bicarb given October 5th  · Follow-up on labs  · Low-potassium pleasure feed diet  · Change tube feeds to Nephro  · Will consult Nutrition Services for goal rate

## 2022-10-07 NOTE — ASSESSMENT & PLAN NOTE
· Initially met criteria with fever, tachypnea, tachycardia, and leukocytosis on admission  Sepsis secondary to recurrent bacteremia and PICC line infections  · PICC line has been removed and patient had PEG tube placed for nutritional support  · Finished full course of ertapenem for ESBL UTI  · Patient with recurrent fever overnight  Will obtain repeat blood cultures  · Rule out recurrent bloodstream infection  Will start empiric vancomycin for now    · Follow up on cultures

## 2022-10-07 NOTE — CONSULTS
Consultation - Neuropsychology/Psychology Department  Spring Iverson 76 y o  female MRN: [id number]  Unit/Bed#: [facility] 323-01 Encounter: [id number]        Reason for Consultation:  Spring Iverson is a 76y o  year old female who was referred for a Neuropsychological Exam to assess cognitive functioning and comment on capacity to make informed medical decisions        History of Present Illness  Admitted with diarrhea with increasing weakness    Physician Requesting Consult: David Kelsey DO    PROBLEM LIST:  Patient Active Problem List   Diagnosis   • Hypothyroidism   • Gastroesophageal reflux disease without esophagitis   • Depression   • Fibromyalgia, primary   • Iron deficiency   • Numbness of foot   • Dysphasia   • Epigastric pain   • COVID-19   • Acute bronchitis   • Shortness of breath   • Medicare annual wellness visit, subsequent   • Fibromyalgia syndrome   • Osteopenia of both forearms   • Cerumen debris on tympanic membrane of right ear   • Nasal congestion   • Bilateral hearing loss   • Anemia   • Dyspnea on exertion   • Generalized weakness   • Elevated LFTs   • Hyponatremia   • Abnormal CT scan   • H/O bariatric surgery - bypass   • Fever   • Gastric ulcer   • Acute blood loss anemia   • NOAH (acute kidney injury) ([address] )   • Ambulatory dysfunction   • Severe protein-calorie malnutrition (HCC)   • Bilateral leg edema   • Gram-positive bacteremia   • Sepsis ([address] )   • Sacral ulcer ([address] )   • Diarrhea   • Fall   • Colitis presumed to be due to infection   • Acute cystitis without hematuria   • Acute kidney insufficiency   • Moderate protein-calorie malnutrition (HCC)   • Bacteremia   • Acute on chronic anemia   • Goals of care, counseling/discussion   • Acute encephalopathy   • Hypotension   • Urinary retention   • Hyperkalemia         Historical Information   Past Medical History:   Diagnosis Date   • Anemia    • Anxiety    • Bipolar disorder (HCC)    • Colon polyp    • Disease of thyroid gland    • Essential hypertension    • Essential tremor    • Fibromyalgia, primary    • GERD (gastroesophageal reflux disease)    • Inflammatory polyarthropathy (HCC)    • Mammogram abnormal    • Pressure injury of skin      Past Surgical History:   Procedure Laterality Date   • BREAST BIOPSY Right 2015    benign   • CARPAL TUNNEL RELEASE Bilateral    • COLONOSCOPY     • EGD AND COLONOSCOPY  2014   • GASTRIC BYPASS  2012   • MAMMO NEEDLE LOCALIZATION RIGHT (ALL [employer]) Right 2012   • MAMMO NEEDLE LOCALIZATION RIGHT (ALL [employer]) Right 2012   • ULNAR NERVE TRANSPOSITION Right      Social History   Social History     Substance and Sexual Activity   Alcohol Use Yes   • Alcohol/week: 4 0 standard drinks   • Types: 4 Glasses of wine per week    Comment: rare     Social History     Substance and Sexual Activity   Drug Use Never     Social History     Tobacco Use   Smoking Status Former Smoker   • Quit date:    • Years since quittin 7   Smokeless Tobacco Never Used     Family History:   Family History   Problem Relation Age of Onset   • No Known Problems Mother    • No Known Problems Father    • No Known Problems Sister    • No Known Problems Maternal Grandmother    • No Known Problems Maternal Grandfather    • No Known Problems Paternal Grandmother    • No Known Problems Paternal Grandfather    • No Known Problems Sister    • No Known Problems Sister    • Stomach cancer Maternal Aunt    • No Known Problems Maternal Aunt    • No Known Problems Maternal Aunt    • No Known Problems Maternal Aunt    • No Known Problems Paternal Aunt    • Leukemia Other        Meds/Allergies   current meds:   Current Facility-Administered Medications   Medication Dose Route Frequency   • acetaminophen (TYLENOL) tablet 975 mg  975 mg Oral Q6H PRN   • ALPRAZolam (XANAX) tablet 0 25 mg  0 25 mg Oral TID PRN   • alteplase (CATHFLO) injection 2 mg  2 mg Intracatheter Once   • bisacodyl (DULCOLAX) rectal suppository 10 mg  10 mg Rectal Daily PRN   • bisacodyl (DULCOLAX) rectal suppository 10 mg  10 mg Rectal Once   • clonazePAM (KlonoPIN) tablet 3 mg  3 mg Oral HS   • folic acid (FOLVITE) tablet 1 mg  1 mg Oral Daily   • guaiFENesin (MUCINEX) 12 hr tablet 600 mg  600 mg Oral Q12H RHONA   • heparin (porcine) subcutaneous injection 5,000 Units  5,000 Units Subcutaneous Q8H [address]   • levothyroxine tablet 112 mcg  112 mcg Oral Q24H   • metoclopramide (REGLAN) injection 10 mg  10 mg Intravenous Q6H PRN   • midodrine (PROAMATINE) tablet 10 mg  10 mg Oral TID AC   • ondansetron (ZOFRAN) injection 4 mg  4 mg Intravenous Q6H PRN   • oxyCODONE (ROXICODONE) IR tablet 5 mg  5 mg Oral Q6H   • pantoprazole (PROTONIX) EC tablet 40 mg  40 mg Oral BID AC   • polyethylene glycol (MIRALAX) packet 17 g  17 g Per PEG Tube BID   • senna (SENOKOT) tablet 17 2 mg  2 tablet Oral HS   • sodium bicarbonate tablet 650 mg  650 mg Oral BID after meals   • sodium chloride tablet 2 g  2 g Oral TID   • thiamine tablet 100 mg  100 mg Oral Daily   • traZODone (DESYREL) tablet 150 mg  150 mg Oral HS   • vancomycin (VANCOCIN) 500 mg in sodium chloride 0 9% 100 mL IVPB  500 mg Intravenous Q24H       No Known Allergies      Family and Social Support:   No data recorded    Behavioral Observations: Alert, UNABLE to accurately state the month, day/week, day/month, state; patient denied depressed mood and anxiety; thoughts at times appeared tangential; exhibited fluctuations in attention; patient was unable to state reason for hospitalization and medical history    Cognitive Examination    General Cognitive Functioning MMSE =Impaired 14/28; Attention/Concentration Auditory Selective Attention = Impaired; Auditory Vigilance = Impaired; Information Processing Speed = Borderline    Frontal Systems/Executive Functioning Mental Flexibility/Cognitive Control = Impaired;  Working Memory = Impaired Abstract Reasoning = Impaired; Generative Ability = Impaired, Commonsense Reasoning and Judgement = Impaired    Language Functioning Confrontation naming = Within Normal Limits, Phonemic Fluency = Impaired; Semantic Retrieval = Impaired; Comprehension of Complex Ideational Material = Impaired; Praxis = Within Normal Limits; Repetition = Within Normal Limits; Basic Reading = Within Normal Limits; Following Commands = Impaired    Memory Functioning Narrative Recall - Short Delay = Impaired; Long Delay Narrative Recall = Impaired; Three word recall = Impaired    Visuo-Spatial Abilities Not Assessed    Functional Knowledge  Health & Safety Knowledge = Impaired;     Summary/Impression:  Results of Neuropsychological Exam revealed diffuse cognitive dysfunction and on a measure assessing awareness of personal health status and ability to evaluate health problems, handle medical emergencies and take safety precautions, patient performed in the IMPAIRED range of functioning  AT this time, patient does not appear to have capacity to make fully informed medical decisions  Unspecified Neurocognitive Disorder

## 2022-10-07 NOTE — PROGRESS NOTES
Vancomycin IV Pharmacy-to-Dose Consultation    Ryann Hargrove is a 76 y o  female who is currently receiving Vancomycin IV with management by the Pharmacy Consult service  Assessment/Plan:  The patient was reviewed  Renal function is stable and no signs or symptoms of nephrotoxicity and/or infusion reactions were documented in the chart  Based on today’s assessment, continue current vancomycin (day # 2) dosing of 500mg q24, with a plan for trough to be drawn at 0930 on oct10  We will continue to follow the patient’s culture results and clinical progress daily      Tyson Moore

## 2022-10-07 NOTE — PROGRESS NOTES
[facility]  Progress Note - Pepito Simmons 1945, 76 y o  female MRN: [id number]  Unit/Bed#: [facility] 323-01 Encounter: [id number]  Primary Care Provider: Sean Perez MD   Date and time admitted to hospital: 8/30/2022 11:49 PM    * Sepsis [facility])  Assessment & Plan  · Initially met criteria with fever, tachypnea, tachycardia, and leukocytosis on admission  Sepsis secondary to recurrent bacteremia and PICC line infections  · PICC line has been removed and patient had PEG tube placed for nutritional support  · Finished full course of ertapenem for ESBL UTI  · Patient with recurrent fever overnight  Will obtain repeat blood cultures  · Rule out recurrent bloodstream infection  Will start empiric vancomycin for now  · Follow up on cultures    Hyperkalemia  Assessment & Plan  · Follow up on a m  Labs  · Nephrology following - giving calcium gluconate, D50/insulin, sodium bicarb given October 5th  · Follow-up on labs  · Low-potassium pleasure feed diet  · Change tube feeds to Nephro  · Will consult Nutrition Services for goal rate    Urinary retention  Assessment & Plan  · Has required intermittent catheterizations however now voiding spontaneously  · Continue retention protocol    Moderate protein-calorie malnutrition ([facility] [address] )  Assessment & Plan  Malnutrition Findings:   Adult Malnutrition type: Chronic illness  Adult Degree of Malnutrition: Other severe protein calorie malnutrition  Malnutrition Characteristics: Weight loss, Muscle loss       360 Statement: Severe/Chronic malnutrition r/t condition, as evidenced by 4 8% wt loss x < 1 week (9/2/22: 125#, 9/5/22: 119#), and severe muscle mass depletion (temples/clavicle)  Treated with liberalized diet and nutrition supplements, possibly nutrition support pending goals of care    BMI Findings: Body mass index is 18 28 kg/m²     · Continue tube feeds    Hypothyroidism  Assessment & Plan  · TSH elevated and fT4 low  · Endocrinology consulted - levothyroxine timing was adjusted to 2pm per Endocrinology recs (4 hours after tube feeds have stopped running)  · Repeat TFTs in 1 week per Endocrine      VTE Pharmacologic Prophylaxis:   Pharmacologic: Heparin  Mechanical VTE Prophylaxis in Place: No    Patient Centered Rounds: I have performed bedside rounds with nursing staff today  Time Spent for Care: 15 minutes  More than 50% of total time spent on counseling and coordination of care as described above  Current Length of Stay: 37 day(s)    Current Patient Status: Inpatient       Code Status: Level 3 - DNAR and DNI      Subjective:   No acute distress  Patient able to eat cereal and banana bedside  Objective:     Vitals:   Temp (24hrs), Av 5 °F (37 5 °C), Min:97 4 °F (36 3 °C), Max:101 6 °F (38 7 °C)    Temp:  [97 4 °F (36 3 °C)-101 6 °F (38 7 °C)] 97 4 °F (36 3 °C)  HR:  [92] 92  Resp:  [20] 20  BP: (106)/(46) 106/46  SpO2:  [91 %] 91 %  Body mass index is 18 28 kg/m²  Input and Output Summary (last 24 hours): Intake/Output Summary (Last 24 hours) at 10/7/2022 1519  Last data filed at 10/7/2022 1020  Gross per 24 hour   Intake 1418 ml   Output 551 ml   Net 867 ml       Physical Exam:     Physical Exam  Constitutional:       Appearance: Normal appearance  Cardiovascular:      Rate and Rhythm: Normal rate and regular rhythm  Pulses: Normal pulses  Heart sounds: Normal heart sounds  Pulmonary:      Effort: Pulmonary effort is normal       Breath sounds: Normal breath sounds  Abdominal:      General: Abdomen is flat  Palpations: Abdomen is soft  Skin:     General: Skin is warm and dry  Neurological:      General: No focal deficit present  Mental Status: She is alert and oriented to person, place, and time     Psychiatric:         Mood and Affect: Mood normal          Behavior: Behavior normal          Additional Data:     Labs:    Results from last 7 days   Lab Units 10/07/22  0554   WBC Thousand/uL 14 20*   HEMOGLOBIN g/dL 7 3*   HEMATOCRIT % 23 9*   PLATELETS Thousands/uL 539*   NEUTROS PCT % 64   LYMPHS PCT % 16   MONOS PCT % 7   EOS PCT % 11*     Results from last 7 days   Lab Units 10/07/22  0554   POTASSIUM mmol/L 5 6*   CHLORIDE mmol/L 104   CO2 mmol/L 24   BUN mg/dL 30*   CREATININE mg/dL 1 48*   CALCIUM mg/dL 8 4           * I Have Reviewed All Lab Data Listed Above  * Additional Pertinent Lab Tests Reviewed:  All Labs Within Last 24 Hours Reviewed      Recent Cultures (last 7 days):           Last 24 Hours Medication List:   Current Facility-Administered Medications   Medication Dose Route Frequency Provider Last Rate   • acetaminophen  975 mg Oral Q6H PRN Jose Palmer PA-C     • ALPRAZolam  0 25 mg Oral TID PRN Rahul Urias DO     • alteplase  2 mg Intracatheter Once [employer] YNES Sevilla     • bisacodyl  10 mg Rectal Daily PRN Nan Teixeira MD     • bisacodyl  10 mg Rectal Once David Curtis MD     • clonazePAM  3 mg Oral HS Jovitakash Mcdermott, DO     • folic acid  1 mg Oral Daily Jovitakash Mcdermott, DO     • guaiFENesin  600 mg Oral Q12H [facility] Jose Palmer PA-C     • heparin (porcine)  5,000 Units Subcutaneous [facility] Rahul Urias, DO     • levothyroxine  112 mcg Oral Q24H Oluwatomisin Prerna Swift MD     • metoclopramide  10 mg Intravenous Q6H PRN Nan Teixeira MD     • midodrine  10 mg Oral TID AC TOLU Orellana     • ondansetron  4 mg Intravenous Q6H PRN Cindy Foley DO     • oxyCODONE  5 mg Oral Q6H Nan Teixeira MD     • pantoprazole  40 mg Oral BID AC Kaylan Arroyo DO     • polyethylene glycol  17 g Per PEG Tube BID Cindy Foley DO     • senna  2 tablet Oral HS Geoff Loving MD     • sodium bicarbonate  650 mg Oral BID after meals TOLU Cai     • sodium chloride  2 g Oral TID Priya Thomas, DO     • thiamine  100 mg Oral Daily Jovitakash Mcdermott, DO     • traZODone  150 mg Oral HS Jovitakash Mcdermott DO     • vancomycin  500 mg Intravenous Q24H Mark Up DO          Today, Patient Was Seen By: Joshua العلي    ** Please Note: Dictation voice to text software may have been used in the creation of this document   **

## 2022-10-08 NOTE — PROGRESS NOTES
Vancomycin IV Pharmacy-to-Dose Consultation    Jada Orellana is a 76 y o  female who is currently receiving Vancomycin IV with management by the Pharmacy Consult service  Assessment/Plan:    The patient's chart was reviewed  Renal function is stable  There are no signs or symptoms of nephrotoxicity and/or infusion reactions documented  Based on today’s assessment, will continue current vancomycin (Day # 2) dosing of 500 mg IV q24h, with a plan for trough to be drawn at 0930 on 10/10 (prior to the 4th dose)  Pharmacy will continue to follow closely for s/sx of nephrotoxicity, infusion reactions and appropriateness of therapy  BMP and CBC will be ordered per protocol  We will continue to follow the patient’s culture results and clinical progress daily        Isauro Tapia, PharmD, [address] [facility] Clinical Pharmacist  [phone]

## 2022-10-08 NOTE — PROGRESS NOTES
[facility]  Progress Note - Shelia Leo 1945, 76 y o  female MRN: [id number]  Unit/Bed#: [facility] 323-01 Encounter: [id number]  Primary Care Provider: Marisela Rabago MD   Date and time admitted to hospital: 8/30/2022 11:49 PM    * Sepsis [facility])  Assessment & Plan  · Initially met criteria with fever, tachypnea, tachycardia, and leukocytosis on admission  Sepsis secondary to recurrent bacteremia and PICC line infections  · PICC line has been removed and patient had PEG tube placed for nutritional support  · Finished full course of ertapenem for ESBL UTI  · Patient with recurrent fever overnight  Will obtain repeat blood cultures  · Rule out recurrent bloodstream infection  Will start empiric vancomycin for now  · Follow up on cultures  · May need to re-consult ID if cultures are positive    Hyperkalemia  Assessment & Plan  · Follow up on a m  Labs  · Nephrology following - giving calcium gluconate, D50/insulin, sodium bicarb given October 5th  · Follow-up on labs  · Low-potassium pleasure feed diet  · Change tube feeds to Nephro  · Will consult Nutrition Services for goal rate    Urinary retention  Assessment & Plan  · Has required intermittent catheterizations however now voiding spontaneously  · Continue retention protocol    Hypotension  Assessment & Plan  · Continue midodrine 10 mg t i d     Goals of care, counseling/discussion  Assessment & Plan  · Evaluated by palliative care  Wants to continue medical directed treatments with limits of DNR/DNI  · Continue current pain management regimen with scheduled oxycodone and additional p r n  Doses  Should follow-up with palliative Care as outpatient  Bacteremia  Assessment & Plan  · Recent hx of staph epi bacteremia in 7/2022, presumed due to picc line  · Recurrence 1/2 set staph epi, 2nd set without growth    Was treated with intravenous vancomycin for presumed line infection  · S/p TAVIA (9/9) no evidence of vegetation  · Blood cultures had been negative however repeated again due to isolated fever which are negative to date  · PICC and TPN discontinued, now has PEG tube  · Monitor off abx    Moderate protein-calorie malnutrition ([address] )  Assessment & Plan  Malnutrition Findings:   Adult Malnutrition type: Chronic illness  Adult Degree of Malnutrition: Other severe protein calorie malnutrition  Malnutrition Characteristics: Weight loss, Muscle loss       360 Statement: Severe/Chronic malnutrition r/t condition, as evidenced by 4 8% wt loss x < 1 week (9/2/22: 125#, 9/5/22: 119#), and severe muscle mass depletion (temples/clavicle)  Treated with liberalized diet and nutrition supplements, possibly nutrition support pending goals of care    BMI Findings: Body mass index is 18 28 kg/m²  · Continue tube feeds    Diarrhea  Assessment & Plan  · Recent history of C diff colitis  · Treated with prophylactic dose of p o  Vancomycin while on antibiotics     H/O bariatric surgery - bypass  Assessment & Plan  · History of Savage-en-Y gastric bypass  Has had significant issues over the last 6 months or so with chronic malnutrition and an anastomotic ulcerations  When last seen by bariatric surgery, it was recommended to continue on prolonged TPN for nutritional optimization with possible revision of the Savage-en-Y in the future    · Given her recurrence PICC line infections and bacteremia, TPN has been discontinued  · Continue tube feeds for nutritional optimization  · Will need significant improvement before consideration can be given to further surgical intervention      Hyponatremia  Assessment & Plan    · Nephrology following   · Continue sodium chloride tabs 2 g q 8 and getting NS bolus      Hypothyroidism  Assessment & Plan  · TSH elevated and fT4 low  · Endocrinology consulted - levothyroxine timing was adjusted to 2pm per Endocrinology recs (4 hours after tube feeds have stopped running)  · Repeat TFTs in 1 week per Endocrine        VTE Pharmacologic Prophylaxis:   Pharmacologic:  Heparin  Mechanical VTE Prophylaxis in Place: No    Patient Centered Rounds: I have performed bedside rounds with nursing staff today  Time Spent for Care: 15 minutes  More than 50% of total time spent on counseling and coordination of care as described above  Current Length of Stay: 38 day(s)    Current Patient Status: Inpatient   C    Code Status: Level 3 - DNAR and DNI      Subjective:   No Acute distress    Objective:     Vitals:   Temp (24hrs), Av 2 °F (36 8 °C), Min:97 8 °F (36 6 °C), Max:98 7 °F (37 1 °C)    Temp:  [97 8 °F (36 6 °C)-98 7 °F (37 1 °C)] 98 °F (36 7 °C)  HR:  [75-90] 75  Resp:  [16-20] 16  BP: ()/(53-61) 105/56  SpO2:  [94 %-97 %] 97 %  Body mass index is 18 28 kg/m²  Input and Output Summary (last 24 hours): Intake/Output Summary (Last 24 hours) at 10/8/2022 0933  Last data filed at 10/7/2022 1020  Gross per 24 hour   Intake 1418 ml   Output 101 ml   Net 1317 ml       Physical Exam:     Physical Exam  Constitutional:       Appearance: Normal appearance  HENT:      Head: Normocephalic and atraumatic  Cardiovascular:      Rate and Rhythm: Normal rate and regular rhythm  Abdominal:      General: Abdomen is flat  Palpations: Abdomen is soft  Neurological:      General: No focal deficit present  Mental Status: She is alert and oriented to person, place, and time     Psychiatric:         Mood and Affect: Mood normal          Behavior: Behavior normal          Additional Data:     Labs:    Results from last 7 days   Lab Units 10/07/22  0554   WBC Thousand/uL 14 20*   HEMOGLOBIN g/dL 7 3*   HEMATOCRIT % 23 9*   PLATELETS Thousands/uL 539*   NEUTROS PCT % 64   LYMPHS PCT % 16   MONOS PCT % 7   EOS PCT % 11*     Results from last 7 days   Lab Units 10/08/22  0500   POTASSIUM mmol/L 5 6*   CHLORIDE mmol/L 104   CO2 mmol/L 24   BUN mg/dL 32*   CREATININE mg/dL 1 53*   CALCIUM mg/dL 8 5 * I Have Reviewed All Lab Data Listed Above  * Additional Pertinent Lab Tests Reviewed: All Labs Within Last 24 Hours Reviewed        Recent Cultures (last 7 days):           Last 24 Hours Medication List:   Current Facility-Administered Medications   Medication Dose Route Frequency Provider Last Rate   • acetaminophen  975 mg Oral Q6H PRN Susan Jason PA-C     • ALPRAZolam  0 25 mg Oral TID PRN Jean Carlos Cortez, DO     • alteplase  2 mg Intracatheter Once Larry Sevilla PA-C     • bisacodyl  10 mg Rectal Daily PRN Hannah Hwang MD     • bisacodyl  10 mg Rectal Once Nicol Gross MD     • clonazePAM  3 mg Oral HS Jovita Mcdermott, DO     • folic acid  1 mg Oral Daily Jovita Mcdermott, DO     • guaiFENesin  600 mg Oral Q12H [address] Susan Jason PA-C     • heparin (porcine)  5,000 Units Subcutaneous [facility] Jean Carlos Cortez, DO     • levothyroxine  112 mcg Oral Q24H [place] Aislinn Hawkins MD     • metoclopramide  10 mg Intravenous Q6H PRN Hannah Hwang MD     • midodrine  10 mg Oral TID AC TOLU Orellana     • ondansetron  4 mg Intravenous Q6H PRN Meet Mohr DO     • oxyCODONE  5 mg Oral Q6H Hannah Hwang MD     • pantoprazole  40 mg Oral BID AC Kaylan Arroyo DO     • polyethylene glycol  17 g Per PEG Tube BID Meet Mohr DO     • senna  2 tablet Oral HS Omid Bell MD     • sodium bicarbonate  650 mg Oral BID after meals TOLU Davalos     • sodium chloride  2 g Oral TID Monica Gallardo DO     • thiamine  100 mg Oral Daily Jovita Mcdermott, DO     • traZODone  150 mg Oral HS Jovita Mcdermott, DO     • vancomycin  500 mg Intravenous Q24H Mark Up DO          Today, Patient Was Seen By: Asmita Crook    ** Please Note: Dictation voice to text software may have been used in the creation of this document   **

## 2022-10-08 NOTE — PROGRESS NOTES
Progress Note - Nephrology   Spring Iverson 76 y o  female MRN: [id number]  Unit/Bed#: [facility] 323-01 Encounter: [id number]      Assessment / Plan:  1  Elevated sCr in the setting of recent sepsis with UTI - b/l sCr 0 8-1 2, now stable 1 5-1 6  Suspect possible NOAH vs new baseline in light of low systolic BP readings  -monitor BMP     2  Hyponatremia likely due to poor solute intake with gastric bypass - sNa has improved to 133, monitor BMP on salt tab 2 g t i d      3  Hypotension - continue midodrine 10mg po TID, am cortisol 11 5, some improvement with IVF but remains low normal with systolic in 82N at times     4  Hyperkalemia - K 5 6, remains elevated, ensure no hemolysis by obtain bloodwork without tourniquet  Agree with adding sodium bicarbonate  On Nepro  Must follow low potassium diet with pleasure feeds  Has been eating bananas  5  Anemia - Hgb low 7s, monitor CBC, transfuse prn    Subjective:   She states that she is eating bananas  I informed her she should stop due to hyperkalemia  She thanked me for the advised  Denies chest pain or shortness of breath  Objective:     Vitals: Blood pressure 105/56, pulse 75, temperature 98 °F (36 7 °C), temperature source Oral, resp  rate 16, height 5' 4" (1 626 m), weight 48 3 kg (106 lb 7 7 oz), SpO2 97 %  ,Body mass index is 18 28 kg/m²  Temp (24hrs), Av 2 °F (36 8 °C), Min:97 8 °F (36 6 °C), Max:98 7 °F (37 1 °C)      Weight (last 2 days)     None          No intake or output data in the 24 hours ending 10/08/22 1202  I/O last 24 hours: In: 4708 [NG/GT:192; Feedings:1226]  Out: 101 [Urine:100; Stool:1]        Physical Exam:   Physical Exam  Vitals and nursing note reviewed  Constitutional:       General: She is not in acute distress  Appearance: She is well-developed  She is ill-appearing  She is not diaphoretic  Comments: thin   HENT:      Head: Normocephalic and atraumatic        Nose: Nose normal       Mouth/Throat:      Mouth: Mucous membranes are moist       Pharynx: No oropharyngeal exudate  Eyes:      General: No scleral icterus  Right eye: No discharge  Left eye: No discharge  Neck:      Thyroid: No thyromegaly  Cardiovascular:      Rate and Rhythm: Normal rate and regular rhythm  Heart sounds: No murmur heard  Pulmonary:      Effort: Pulmonary effort is normal  No respiratory distress  Breath sounds: Normal breath sounds  No wheezing  Abdominal:      General: Bowel sounds are normal  There is no distension  Palpations: Abdomen is soft  Comments: Peg tube   Genitourinary:     Comments: purewick  Musculoskeletal:      Cervical back: Normal range of motion and neck supple  Skin:     General: Skin is warm and dry  Findings: No rash  Neurological:      General: No focal deficit present  Mental Status: She is alert  Motor: No abnormal muscle tone        Comments: awake   Psychiatric:         Mood and Affect: Mood normal          Behavior: Behavior normal          Invasive Devices  Report    Peripheral Intravenous Line  Duration           Peripheral IV 10/06/22 Right;Ventral (anterior) Forearm 1 day          Drain  Duration           Gastrostomy/Enterostomy Percutaneous Endoscopic Gastrostomy (PEG) 20 Fr  LUQ 29 days    External Urinary Catheter 2 days                Medications:    Scheduled Meds:  Current Facility-Administered Medications   Medication Dose Route Frequency Provider Last Rate   • acetaminophen  975 mg Oral Q6H PRN Rocio Morales PA-C     • ALPRAZolam  0 25 mg Oral TID PRN Marco Antonio Rodriguez DO     • alteplase  2 mg Intracatheter Once Josiah Ornelas PA-C     • bisacodyl  10 mg Rectal Daily PRN Kenyetta Weston MD     • bisacodyl  10 mg Rectal Once Mitali Chirinos MD     • clonazePAM  3 mg Oral HS Jovita Mcdermott, DO     • folic acid  1 mg Oral Daily Jovita Mcdermott, DO     • guaiFENesin  600 mg Oral Q12H [address] Rocio Morales PA-C     • heparin (porcine) 5,000 Units Subcutaneous [facility] Raj Screen, DO     • levothyroxine  112 mcg Oral Q24H [place] Joe Hall MD     • metoclopramide  10 mg Intravenous Q6H PRN Savi Banda MD     • midodrine  10 mg Oral TID AC TOLU Orellana     • ondansetron  4 mg Intravenous Q6H PRN Dennie Longest, DO     • oxyCODONE  5 mg Oral Q6H Savi Bnada MD     • pantoprazole  40 mg Oral BID AC Kaylan Arroyo DO     • polyethylene glycol  17 g Per PEG Tube BID Dennie Longest, DO     • senna  2 tablet Oral HS Susanna Mendiola MD     • sodium bicarbonate  650 mg Oral BID after meals TOLU Pérez     • sodium chloride  2 g Oral TID Marsha Barry DO     • thiamine  100 mg Oral Daily Jovitakash Mcdermott DO     • traZODone  150 mg Oral HS Jovita Mcdermott DO     • vancomycin  500 mg Intravenous Q24H Mark Up  mg (10/08/22 1137)       PRN Meds: •  acetaminophen  •  ALPRAZolam  •  bisacodyl  •  metoclopramide  •  ondansetron    Continuous Infusions:         LAB RESULTS:      Results from last 7 days   Lab Units 10/08/22  0500 10/07/22  0554 10/06/22  2241 10/06/22  1128 10/05/22  2242 10/05/22  1040 10/04/22  1124   WBC Thousand/uL  --  14 20* 16 57*  --   --  14 97* 13 50*   HEMOGLOBIN g/dL  --  7 3* 7 4*  --   --  8 6* 8 4*   HEMATOCRIT %  --  23 9* 23 9*  --   --  27 6* 26 6*   PLATELETS Thousands/uL  --  539* 558*  --   --  574* 582*   NEUTROS PCT %  --  64  --   --   --   --   --    LYMPHS PCT %  --  16  --   --   --   --   --    MONOS PCT %  --  7  --   --   --   --   --    EOS PCT %  --  11*  --   --   --   --   --    POTASSIUM mmol/L 5 6* 5 6*  --  5 6* 5 5* 5 9*  --    CHLORIDE mmol/L 104 104  --  102 103 101  --    CO2 mmol/L 24 24  --  23 28 25  --    BUN mg/dL 32* 30*  --  30* 30* 33*  --    CREATININE mg/dL 1 53* 1 48*  --  1 54* 1 58* 1 58*  --    CALCIUM mg/dL 8 5 8 4  --  9 2 8 8 8 3  --    MAGNESIUM mg/dL  --   --   --   --   --   --  2 4       CUTURES:  Lab Results   Component Value Date    BLOODCX No Growth After 5 Days  09/26/2022    BLOODCX No Growth After 5 Days  09/26/2022    BLOODCX No Growth After 5 Days  09/23/2022    BLOODCX No Growth After 5 Days  09/23/2022    BLOODCX No Growth After 5 Days  09/02/2022    BLOODCX No Growth After 5 Days  09/02/2022    BLOODCX No Growth After 5 Days  08/31/2022    BLOODCX Staphylococcus epidermidis (A) 08/31/2022    BLOODCX Staphylococcus pettenkoferi (A) 08/31/2022    URINECX >100,000 cfu/ml Proteus vulgaris (A) 09/23/2022    URINECX >100,000 cfu/ml Citrobacter freundii (A) 09/23/2022    URINECX >100,000 cfu/ml Klebsiella oxytoca ESBL (A) 09/23/2022    URINECX >100,000 cfu/ml Morganella morganii (A) 09/23/2022    URINECX (A) 08/31/2022     80,000-89,000 cfu/ml - 2 strains Gram Negative Jerome Enteric Like    URINECX 40,000-49,000 cfu/ml Proteus species (A) 08/31/2022    URINECX 40,000-49,000 cfu/ml Enterococcus species (A) 08/31/2022                 Portions of the record may have been created with voice recognition software  Occasional wrong word or "sound a like" substitutions may have occurred due to the inherent limitations of voice recognition software  Read the chart carefully and recognize, using context, where substitutions have occurred  If you have any questions, please contact the dictating provider

## 2022-10-08 NOTE — ASSESSMENT & PLAN NOTE
· Initially met criteria with fever, tachypnea, tachycardia, and leukocytosis on admission  Sepsis secondary to recurrent bacteremia and PICC line infections  · PICC line has been removed and patient had PEG tube placed for nutritional support  · Finished full course of ertapenem for ESBL UTI  · Patient with recurrent fever overnight  Will obtain repeat blood cultures  · Rule out recurrent bloodstream infection  Will start empiric vancomycin for now    · Follow up on cultures  · May need to re-consult ID if cultures are positive

## 2022-10-08 NOTE — CONSULTS
10/08/22 1126   Recommendations/Interventions   Recommendations to Provider nepro at 45ml/hr x16hrs will provide 720ml, 1296kcal (27kcal/kg), 58g protein (1 2g/kg), 523ml free H2O from TF formula, 723ml total from TF and flushes (50ml q 6hrs)

## 2022-10-09 NOTE — PROGRESS NOTES
Progress Note - Infectious Disease   Ryann Hargrove 76 y o  female MRN: [id number]  Unit/Bed#: [facility] 323-01 Encounter: [id number]      Impression/Plan:  1  Sepsis, recurrent   New isolated fever on 10/6/2022 of unclear significance  No workup done for the isolated fever spike but the patient was started on intravenous vancomycin  Patient previously completed previous antibiotic course for UTI as below  Patient remains stable without any localizing symptomatology  Discontinue vancomycin  Monitor off all antibiotics  If fever recurs, recheck blood cultures x2 sets, check UA C&S, check chest x-ray, and check procalcitonin level  Recheck CBC with diff, CMP  Additional supportive care as per primary     2  Urinary tract infection   UA with some pyuria and patient reporting dysuria   Urine cultures noted to be polymicrobial with some degree of resistance   Suspect partial treatment leading to declining white count but continued fever   Patient has completed his treatment course with antibiotics and has been stable off all antibiotics except the isolated fever spike  Patient has chronic urinary symptoms that are difficult to interpret  No additional antibiotics  Monitor for current symptoms  Continue to trend fever curve/WBC  If fever recurs, check urinalysis and culture     3  Abnormal abdominal imaging   Patient noted to have some abnormal changes to the gallbladder on imaging   Recent ultrasound unremarkable   Alkaline phosphatase borderline   Patient only localizes discomfort near the PEG tube, adjusted by GI   Repeat CT unremarkable  Serial exams     4  Recent C diff   Repeat PCR testing negative    Patient has completed C diff prophylaxis and has not had recurrence of diarrhea      5  Progressing anemia and recent GI bleed   Hemoglobin downtrended this weekend   Reportedly had some increased stool output   Continue to monitor hemoglobin and stool output   GI evaluations noted      6  Elevated serum creatinine with eosinophilia   Unclear etiology  Nephrology evaluation appreciated  Fluid hydration as per primary  Continue enteral feeding  Consider review of medications as above  Repeat chemistry tomorrow    Discussed the above management plan with the primary service    Antibiotics:  Vancomycin 3    Subjective:  Asked to reassess patient is the patient had a fever spike on 10/6/2022  Unfortunately no additional studies were done at that time including no blood cultures  The patient was started on vancomycin is remained on the vancomycin since that time without any recurrence of the fever  Patient has no fever, chills, sweats; no nausea, vomiting, diarrhea; no cough, shortness of breath; no pain  No new symptoms  She is not requiring any oxygen support  Objective:  Vitals:  Temp:  [97 6 °F (36 4 °C)-97 8 °F (36 6 °C)] 97 8 °F (36 6 °C)  HR:  [91-93] 93  Resp:  [20] 20  BP: (107-114)/(59-63) 114/63  SpO2:  [94 %-95 %] 95 %  Temp (24hrs), Av 7 °F (36 5 °C), Min:97 6 °F (36 4 °C), Max:97 8 °F (36 6 °C)  Current: Temperature: 97 8 °F (36 6 °C)    Physical Exam:   General Appearance:  Alert, interactive, nontoxic, no acute distress  Throat: Oropharynx moist without lesions  Lungs:   Decreased breath sounds bilaterally; no wheezes, rhonchi or rales; respirations unlabored   Heart:  RRR; systolic murmur, rub or gallop   Abdomen:   Soft, non-tender, non-distended, positive bowel sounds  Feeding tube in place without erythema or drainage     Extremities: No clubbing, cyanosis or edema   Skin: No new rashes or lesions  No draining wounds noted         Labs, Imaging, & Other studies:   All pertinent labs and imaging studies were personally reviewed  Results from last 7 days   Lab Units 10/07/22  0554 10/06/22  2241 10/05/22  1040   WBC Thousand/uL 14 20* 16 57* 14 97*   HEMOGLOBIN g/dL 7 3* 7 4* 8 6*   PLATELETS Thousands/uL 539* 558* 574*     Results from last 7 days   Lab Units 10/08/22  0500 10/07/22  0554 10/06/22  1128   SODIUM mmol/L 133* 134* 130*   POTASSIUM mmol/L 5 6* 5 6* 5 6*   CHLORIDE mmol/L 104 104 102   CO2 mmol/L 24 24 23   BUN mg/dL 32* 30* 30*   CREATININE mg/dL 1 53* 1 48* 1 54*   EGFR ml/min/1 73sq m 32 33 31   CALCIUM mg/dL 8 5 8 4 9 2

## 2022-10-09 NOTE — PROGRESS NOTES
Vancomycin IV Pharmacy-to-Dose Consultation    Lucho Hartman is a 76 y o  female who is currently receiving Vancomycin IV with management by the Pharmacy Consult service  Assessment/Plan:    The patient's chart was reviewed  BMP not collected, but UOP 0 7 mL/kg/hr  There are no signs or symptoms of nephrotoxicity and/or infusion reactions documented  Based on today’s assessment, will continue current vancomycin (Day # 3) dosing of 500 mg IV q24h, with a plan for trough to be drawn at 0930 on 10/10 (prior to the 4th dose)  Pharmacy will continue to follow closely for s/sx of nephrotoxicity, infusion reactions and appropriateness of therapy  BMP and CBC will be ordered per protocol  We will continue to follow the patient’s culture results and clinical progress daily        Phu Hernandez, PharmD, [address] [facility] Clinical Pharmacist  [phone]

## 2022-10-09 NOTE — QUICK NOTE
Labs not yet drawn for today  Please follow recommendations from nephrology note yesterday  To see in follow up Monday, 10/10

## 2022-10-09 NOTE — CONSULTS
Vancomycin IV Pharmacy-to-Dose Consultation    Jie Sy is a 76 y o  female who was receiving Vancomycin IV with management by the Pharmacy Consult service  The patient’s Vancomycin therapy has been discontinued  Thank you for allowing us to take part in this patient's care  Pharmacy will sign-off now; please call or re-consult if there are any questions          Dary Alejandro, PharmD, [address] [facility] Clinical Pharmacist  [phone]

## 2022-10-09 NOTE — PROGRESS NOTES
[facility]  Progress Note - Arcadio Williamson 1945, 76 y o  female MRN: [id number]  Unit/Bed#: [facility] 323-01 Encounter: [id number]  Primary Care Provider: Franco Dowling MD   Date and time admitted to hospital: 8/30/2022 11:49 PM    * Sepsis [facility])  Assessment & Plan  · Initially met criteria with fever, tachypnea, tachycardia, and leukocytosis on admission  Sepsis secondary to recurrent bacteremia and PICC line infections  · PICC line has been removed and patient had PEG tube placed for nutritional support  · Finished full course of ertapenem for ESBL UTI  · Patient with recurrent fever overnight  Will obtain repeat blood cultures  · Rule out recurrent bloodstream infection  · Follow up on cultures  Discussed with nursing  Blood cultures were not drawn yet on the 7th  They will attempt today  Unfortunately patient has been on vancomycin since the 7th    Urinary retention  Assessment & Plan  · Has required intermittent catheterizations however now voiding spontaneously  · Continue retention protocol    Hypotension  Assessment & Plan  · Continue midodrine 10 mg t i d     Moderate protein-calorie malnutrition ([address] )  Assessment & Plan  Malnutrition Findings:   Adult Malnutrition type: Chronic illness  Adult Degree of Malnutrition: Other severe protein calorie malnutrition  Malnutrition Characteristics: Weight loss, Muscle loss       360 Statement: Severe/Chronic malnutrition r/t condition, as evidenced by 4 8% wt loss x < 1 week (9/2/22: 125#, 9/5/22: 119#), and severe muscle mass depletion (temples/clavicle)  Treated with liberalized diet and nutrition supplements, possibly nutrition support pending goals of care    BMI Findings: Body mass index is 18 28 kg/m²     · Continue tube feeds    Hyponatremia  Assessment & Plan    · Nephrology following   · Continue sodium chloride tabs 2 g q 8 and getting NS bolus      Hypothyroidism  Assessment & Plan  · TSH elevated and fT4 low  · Endocrinology consulted - levothyroxine timing was adjusted to 2pm per Endocrinology recs (4 hours after tube feeds have stopped running)  · Repeat TFTs in 1 week per Endocrine        VTE Pharmacologic Prophylaxis:   Pharmacologic: Heparin  Mechanical VTE Prophylaxis in Place: No    Patient Centered Rounds: I have performed bedside rounds with nursing staff today  Time Spent for Care: 15 minutes  More than 50% of total time spent on counseling and coordination of care as described above  Current Length of Stay: 39 day(s)    Current Patient Status: Inpatient       Code Status: Level 3 - DNAR and DNI      Subjective:   No acute distress    Objective:     Vitals:   Temp (24hrs), Av 7 °F (36 5 °C), Min:97 6 °F (36 4 °C), Max:97 8 °F (36 6 °C)    Temp:  [97 6 °F (36 4 °C)-97 8 °F (36 6 °C)] 97 8 °F (36 6 °C)  HR:  [91-93] 93  Resp:  [20] 20  BP: (107-114)/(59-63) 114/63  SpO2:  [94 %-95 %] 95 %  Body mass index is 18 28 kg/m²  Input and Output Summary (last 24 hours): Intake/Output Summary (Last 24 hours) at 10/9/2022 1050  Last data filed at 10/9/2022 0690  Gross per 24 hour   Intake --   Output 800 ml   Net -800 ml       Physical Exam:     Physical Exam  Constitutional:       Appearance: Normal appearance  HENT:      Head: Normocephalic and atraumatic  Eyes:      Extraocular Movements: Extraocular movements intact  Pupils: Pupils are equal, round, and reactive to light  Cardiovascular:      Rate and Rhythm: Normal rate and regular rhythm  Pulmonary:      Effort: Pulmonary effort is normal       Breath sounds: Normal breath sounds  Abdominal:      General: Abdomen is flat  Palpations: Abdomen is soft  Neurological:      General: No focal deficit present  Mental Status: She is alert and oriented to person, place, and time     Psychiatric:         Mood and Affect: Mood normal          Behavior: Behavior normal          Additional Data: Labs:    Results from last 7 days   Lab Units 10/07/22  0554   WBC Thousand/uL 14 20*   HEMOGLOBIN g/dL 7 3*   HEMATOCRIT % 23 9*   PLATELETS Thousands/uL 539*   NEUTROS PCT % 64   LYMPHS PCT % 16   MONOS PCT % 7   EOS PCT % 11*     Results from last 7 days   Lab Units 10/08/22  0500   POTASSIUM mmol/L 5 6*   CHLORIDE mmol/L 104   CO2 mmol/L 24   BUN mg/dL 32*   CREATININE mg/dL 1 53*   CALCIUM mg/dL 8 5           * I Have Reviewed All Lab Data Listed Above  * Additional Pertinent Lab Tests Reviewed:  All Labs Within Last 24 Hours Reviewed      Recent Cultures (last 7 days):           Last 24 Hours Medication List:   Current Facility-Administered Medications   Medication Dose Route Frequency Provider Last Rate   • acetaminophen  975 mg Oral Q6H PRN Stevan Deleon PA-C     • ALPRAZolam  0 25 mg Oral TID PRN Isela Mccormick DO     • alteplase  2 mg Intracatheter Once Reliant YNES Sevilla     • bisacodyl  10 mg Rectal Daily PRN Vivien Tidwell MD     • bisacodyl  10 mg Rectal Once Lonnie Huertas MD     • clonazePAM  3 mg Oral HS Jovita Mcdermott DO     • folic acid  1 mg Oral Daily Jovita Mcdermott DO     • guaiFENesin  600 mg Oral Q12H [address] Stevan Deleon PA-C     • heparin (porcine)  5,000 Units Subcutaneous [facility] Isela Jace DO     • levothyroxine  112 mcg Oral Q24H [place] Janece Burkitt, MD     • metoclopramide  10 mg Intravenous Q6H PRN Vivien Tdiwell MD     • midodrine  10 mg Oral TID AC TOLU Orellana     • ondansetron  4 mg Intravenous Q6H PRN Charmayne Hilts, DO     • oxyCODONE  5 mg Oral Q6H Vivien Tidwell MD     • pantoprazole  40 mg Oral BID AC Kaylan Arroyo DO     • polyethylene glycol  17 g Per PEG Tube BID Charmayne Hilts, DO     • senna  2 tablet Oral HS Senthil Huang MD     • sodium bicarbonate  650 mg Oral BID after meals TOLU Thompson     • sodium chloride  2 g Oral TID Adina Lopez DO     • thiamine  100 mg Oral Daily Charmayne Hilts, DO • traZODone 150 mg Oral HS Jovita Mcdermott DO     • vancomycin  500 mg Intravenous Q24H Mark Up  mg (10/09/22 3694)        Today, Patient Was Seen By: Charles Erickson    ** Please Note: Dictation voice to text software may have been used in the creation of this document   **

## 2022-10-09 NOTE — ASSESSMENT & PLAN NOTE
· Initially met criteria with fever, tachypnea, tachycardia, and leukocytosis on admission  Sepsis secondary to recurrent bacteremia and PICC line infections  · PICC line has been removed and patient had PEG tube placed for nutritional support  · Finished full course of ertapenem for ESBL UTI  · Patient with recurrent fever overnight  Will obtain repeat blood cultures  · Rule out recurrent bloodstream infection  Will start empiric vancomycin for now  · Follow up on cultures  Discussed with nursing  Blood cultures were not drawn yet on the 7th  They will attempt today    Unfortunately patient has been on vancomycin since the 7th

## 2022-10-09 NOTE — CASE MANAGEMENT
Case Management Discharge Planning Note    Patient name Klaudia Serrato  Location [address] [place] [address] [facility] [id number]  : 1945 Date 10/9/2022       Current Admission Date: 2022  Current Admission Diagnosis:Sepsis [facility])   Patient Active Problem List    Diagnosis Date Noted   • Hyperkalemia 10/05/2022   • Hypotension 2022   • Urinary retention 2022   • Acute encephalopathy 2022   • Moderate protein-calorie malnutrition ([address] ) 2022   • Bacteremia 2022   • Acute on chronic anemia 2022   • Goals of care, counseling/discussion 2022   • Colitis presumed to be due to infection 2022   • Acute cystitis without hematuria 2022   • Acute kidney insufficiency 2022   • Fall 2022   • Diarrhea 2022   • Sepsis ([address] ) 2022   • Sacral ulcer ([address] ) 2022   • Gram-positive bacteremia 2022   • Severe protein-calorie malnutrition ([address] ) 2022   • Bilateral leg edema 2022   • Ambulatory dysfunction 2022   • Acute blood loss anemia 2022   • NOAH (acute kidney injury) ([address] ) 2022   • Gastric ulcer 2022   • Fever 2022   • Anemia 2022   • Dyspnea on exertion 2022   • Generalized weakness 2022   • Elevated LFTs 2022   • Hyponatremia 2022   • Abnormal CT scan 2022   • H/O bariatric surgery - bypass 2022   • Cerumen debris on tympanic membrane of right ear 2022   • Nasal congestion 2022   • Bilateral hearing loss 2022   • Fibromyalgia syndrome 2021   • Osteopenia of both forearms 2021   • Medicare annual wellness visit, subsequent 2021   • Shortness of breath 2021   • Acute bronchitis 2021   • COVID-19 2021   • Dysphasia 2020   • Epigastric pain 2020   • Hypothyroidism 2020   • Gastroesophageal reflux disease without esophagitis 2020   • Depression 2020   • Fibromyalgia, primary 11/13/2020   • Iron deficiency 11/13/2020   • Numbness of foot 11/13/2020      LOS (days): 39  Geometric Mean LOS (GMLOS) (days): 4 80  Days to GMLOS:-34 5     OBJECTIVE:  Risk of Unplanned Readmission Score: 43 24         Current admission status: Inpatient   Preferred Pharmacy:   [place] [address] [address] [facility], [address] [place] [address] [address] [facility] [address]  Phone: [phone] Fax: [phone] [facility], [address] [facility] [address]  Phone: [phone] Fax: [phone]    Primary Care Provider: Franco Dowling MD    Primary Insurance: [facility]  Secondary Insurance:     DISCHARGE DETAILS:    Additional Comments: Discussed patient with SLIM during care coordination rounds  Patient is not cleared for dc today

## 2022-10-10 NOTE — PHYSICAL THERAPY NOTE
Physical Therapy Cancellation Note    Patient's Name: Bora Ramsay       10/10/22 2355   PT Last Visit   PT Visit Date 10/10/22   Note Type   Note Type Treatment   Cancel Reasons Other       Orders received, chart reviewed  PT attempted to see pt for PT today however pt adamantly refusing despite therapist encouragement  Will hold PT at this time and follow for treatment as medically appropriate  Thank you       Umair Sandoval, PT, DPT

## 2022-10-10 NOTE — ASSESSMENT & PLAN NOTE
· Initially met criteria with fever, tachypnea, tachycardia, and leukocytosis on admission  Sepsis secondary to recurrent bacteremia and PICC line infections  · PICC line has been removed and patient had PEG tube placed for nutritional support  · Finished full course of ertapenem for ESBL UTI  · Patient with recurrent fever overnight  Will obtain repeat blood cultures  · Rule out recurrent bloodstream infection  Will start empiric vancomycin for now  · Follow up on cultures  Discussed with nursing  Blood cultures were not drawn yet on the 7th  They will attempt today  · Discussed with infectious disease    Will observe off of antibiotic therapy follow-up on results of cultures

## 2022-10-10 NOTE — OCCUPATIONAL THERAPY NOTE
Occupational Therapy CX        Patient Name: Lenwood Fabry EGYOR'R Date: 10/10/2022         10/10/22 1640   OT Last Visit   OT Visit Date 10/10/22   Note Type   Note type Cancelled Session   Cancel Reasons Refusal   Additional Comments PT REFUSING THERAPY ON THIS DATE  WILL CONT TO FOLLOW AS APPROPRIATE/AGREEABLE         Maryan Diacik, MOT, OTR/L

## 2022-10-10 NOTE — PROGRESS NOTES
NEPHROLOGY PROGRESS NOTE   Lucho Hartman 76 y o  female MRN: [id number]  Unit/Bed#: [facility] 323-01 Encounter: [id number]      ASSESSMENT & PLAN:  1  Elevated creatinine in the setting of recent sepsis with UTI  Baseline creatinine around 0 8-1 2, now kidney function around 1 5-1 6  Creatinine today up to 1 7, suspect possible NOAH versus new baseline  2  Hyponatremia the setting of poor solute intake with gastric bypass, continue with salt tablets, minimize free water flushes with tube feeds  Follow daily labs  3  Hyperkalemia, improved, follow low-potassium diet, follow daily labs  4  Hypotension, continue with midodrine  5  Sepsis, previously completed antibiotics for UTI  Infection Disease on board    6  Malnutrition, continue with tube feeds    7  Recent C diff, completed C diff prophylaxis    8  Anemia with recent GI bleeding, monitor H&H, hemoglobin low but stable    SUBJECTIVE:  Patient seen and examined, feeling tired complaining of some left-sided abdominal discomfort, denies any chest pain, no shortness of breath, no nausea, no vomiting    OBJECTIVE:  Current Weight: Weight - Scale:  (Unable to weigh Pt   Bed not accessible and Pt too weak to get OOB )  Vitals:    10/10/22 0843   BP: 107/50   Pulse: 88   Resp: 20   Temp: 98 4 °F (36 9 °C)   SpO2: 93%     No intake or output data in the 24 hours ending 10/10/22 7752    General:  Chronically ill, cachectic, cooperative, in not acute distress  Eyes: conjunctivae pale, anicteric sclerae  ENT: lips and mucous membranes moist  Neck: supple, no JVD  Chest: clear breath sounds bilateral, no crackles, ronchus or wheezings  CVS: distinct S1 & S2, normal rate, regular rhythm  Abdomen: soft, non-tender, non-distended, normoactive bowel sounds, peg tube in place  Extremities: no edema of both legs  Skin: no rash  Neuro: awake, alert, oriented        Medications:    Current Facility-Administered Medications:   •  acetaminophen (TYLENOL) tablet 975 mg, 975 mg, Oral, Q6H PRN, Stevan Chol, PA-C, 975 mg at 09/26/22 2315  •  ALPRAZolam [place]am [place]) tablet 0 25 mg, 0 25 mg, Oral, TID PRN, Isela Jace, DO, 0 25 mg at 09/24/22 1616  •  alteplase (CATHFLO) injection 2 mg, 2 mg, Intracatheter, Once, Larry Sevilla PA-C  •  bisacodyl (DULCOLAX) rectal suppository 10 mg, 10 mg, Rectal, Daily PRN, Vivien Tidwell MD  •  bisacodyl (DULCOLAX) rectal suppository 10 mg, 10 mg, Rectal, Once, Lonnie Huertas MD  •  clonazePAM (KlonoPIN) tablet 3 mg, 3 mg, Oral, HS, Jovitakash Mcdermott DO, 3 mg at 57/93/46 8351  •  folic acid (FOLVITE) tablet 1 mg, 1 mg, Oral, Daily, Jovitakash Mcdermott DO, 1 mg at 10/10/22 0846  •  guaiFENesin (MUCINEX) 12 hr tablet 600 mg, 600 mg, Oral, Q12H [address], Stevangibran Deleon, PA-C, 600 mg at 10/10/22 0846  •  heparin (porcine) subcutaneous injection 5,000 Units, 5,000 Units, Subcutaneous, Q8H [address], [place] Jace, DO, 5,000 Units at 10/10/22 0445  •  levothyroxine tablet 112 mcg, 112 mcg, Oral, Q24H, [place] Janece Burkitt, MD, 112 mcg at 10/09/22 1313  •  metoclopramide (REGLAN) injection 10 mg, 10 mg, Intravenous, Q6H PRN, Vivien Tidwell MD  •  midodrine (PROAMATINE) tablet 10 mg, 10 mg, Oral, TID AC, TOLU Orellana, 10 mg at 10/10/22 0620  •  ondansetron (ZOFRAN) injection 4 mg, 4 mg, Intravenous, Q6H PRN, Jovita Mcdermott DO, 4 mg at 09/18/22 1339  •  oxyCODONE (ROXICODONE) IR tablet 5 mg, 5 mg, Oral, Q6H, Vivien Tidwell MD, 5 mg at 10/10/22 0445  •  pantoprazole (PROTONIX) EC tablet 40 mg, 40 mg, Oral, BID AC, Kaylan Arroyo DO, 40 mg at 10/10/22 0620  •  polyethylene glycol (MIRALAX) packet 17 g, 17 g, Per PEG Tube, BID, Jovita Mcdermott DO, 17 g at 10/09/22 2132  •  senna (SENOKOT) tablet 17 2 mg, 2 tablet, Oral, HS, Senthil Huang MD, 17 2 mg at 10/09/22 2131  •  sodium bicarbonate tablet 650 mg, 650 mg, Oral, BID after meals, TOLU Thompson, 650 mg at 10/09/22 1619  •  sodium chloride tablet 2 g, 2 g, Oral, TID, Adina Lopez, DO, 2 g at 10/10/22 0846  •  thiamine tablet 100 mg, 100 mg, Oral, Daily, Jovita Mcdermott, DO, 100 mg at 10/10/22 0846  •  traZODone (DESYREL) tablet 150 mg, 150 mg, Oral, HS, Jovita Mcdermott, DO, 150 mg at 10/09/22 2131    Invasive Devices:        Lab Results:   Results from last 7 days   Lab Units 10/10/22  0500 10/09/22  1416 10/08/22  0500 10/07/22  0554 10/05/22  1040 10/04/22  1124   WBC Thousand/uL 14 78* 13 79*  --  14 20*   < > 13 50*   HEMOGLOBIN g/dL 8 2* 8 3*  --  7 3*   < > 8 4*   HEMATOCRIT % 26 3* 26 6*  --  23 9*   < > 26 6*   PLATELETS Thousands/uL 372 652*  --  539*   < > 582*   SODIUM mmol/L 130* 131* 133* 134*   < >  --    POTASSIUM mmol/L 5 0 5 5* 5 6* 5 6*   < >  --    CHLORIDE mmol/L 100 101 104 104   < >  --    CO2 mmol/L 25 23 24 24   < >  --    BUN mg/dL 33* 31* 32* 30*   < >  --    CREATININE mg/dL 1 74* 1 71* 1 53* 1 48*   < >  --    CALCIUM mg/dL 8 9 8 8 8 5 8 4   < >  --    MAGNESIUM mg/dL  --   --   --   --   --  2 4    < > = values in this interval not displayed  Previous work up:  See previous notes      Portions of the record may have been created with voice recognition software  Occasional wrong word or "sound a like" substitutions may have occurred due to the inherent limitations of voice recognition software  Read the chart carefully and recognize, using context, where substitutions have occurred  If you have any questions, please contact the dictating provider

## 2022-10-10 NOTE — PROGRESS NOTES
[address] [facility]  Progress Note - Bon Abdi 1945, 76 y o  female MRN: [id number]  Unit/Bed#: [facility] 323-01 Encounter: [id number]  Primary Care Provider: Roro Alamo MD   Date and time admitted to hospital: 8/30/2022 11:49 PM    * Sepsis [facility])  Assessment & Plan  · Initially met criteria with fever, tachypnea, tachycardia, and leukocytosis on admission  Sepsis secondary to recurrent bacteremia and PICC line infections  · PICC line has been removed and patient had PEG tube placed for nutritional support  · Finished full course of ertapenem for ESBL UTI  · Patient with recurrent fever overnight  Will obtain repeat blood cultures  · Rule out recurrent bloodstream infection  Will start empiric vancomycin for now  · Follow up on cultures  Discussed with nursing  Blood cultures were not drawn yet on the 7th  They will attempt today  · Discussed with infectious disease  Will observe off of antibiotic therapy follow-up on results of cultures  · Will monitor for occult infection  Hyperkalemia  Assessment & Plan  · Follow up on a m  Labs  · Nephrology following - giving calcium gluconate, D50/insulin, sodium bicarb given October 5th  · Follow-up on labs  · Low-potassium pleasure feed diet  · Change tube feeds to Nephro  · Will consult Nutrition Services for goal rate    Urinary retention  Assessment & Plan  · Has required intermittent catheterizations however now voiding spontaneously  · Continue retention protocol    Hypotension  Assessment & Plan  · Continue midodrine 10 mg t i d     Goals of care, counseling/discussion  Assessment & Plan  · Evaluated by palliative care  Wants to continue medical directed treatments with limits of DNR/DNI  · Continue current pain management regimen with scheduled oxycodone and additional p r n  Doses  Should follow-up with palliative Care as outpatient        Bacteremia  Assessment & Plan  · Recent hx of staph epi bacteremia in 7/2022, presumed due to picc line  · Recurrence 1/2 set staph epi, 2nd set without growth  Was treated with intravenous vancomycin for presumed line infection  · S/p TAVIA (9/9) no evidence of vegetation  · Blood cultures had been negative however repeated again due to isolated fever which are negative to date  · PICC and TPN discontinued, now has PEG tube  · Monitor off abx    Moderate protein-calorie malnutrition ([address] )  Assessment & Plan  Malnutrition Findings:   Adult Malnutrition type: Chronic illness  Adult Degree of Malnutrition: Other severe protein calorie malnutrition  Malnutrition Characteristics: Weight loss, Muscle loss       360 Statement: Severe/Chronic malnutrition r/t condition, as evidenced by 4 8% wt loss x < 1 week (9/2/22: 125#, 9/5/22: 119#), and severe muscle mass depletion (temples/clavicle)  Treated with liberalized diet and nutrition supplements, possibly nutrition support pending goals of care    BMI Findings: Body mass index is 18 28 kg/m²  · Continue tube feeds    Diarrhea  Assessment & Plan  · Recent history of C diff colitis  · Treated with prophylactic dose of p o  Vancomycin while on antibiotics     H/O bariatric surgery - bypass  Assessment & Plan  · History of Savage-en-Y gastric bypass  Has had significant issues over the last 6 months or so with chronic malnutrition and an anastomotic ulcerations  When last seen by bariatric surgery, it was recommended to continue on prolonged TPN for nutritional optimization with possible revision of the Savage-en-Y in the future    · Given her recurrence PICC line infections and bacteremia, TPN has been discontinued  · Continue tube feeds for nutritional optimization  · Will need significant improvement before consideration can be given to further surgical intervention      Hyponatremia  Assessment & Plan    · Nephrology following   · Continue sodium chloride tabs 2 g q 8 and getting NS bolus      Hypothyroidism  Assessment & Plan  · TSH elevated and fT4 low  · Endocrinology consulted - levothyroxine timing was adjusted to 2pm per Endocrinology recs (4 hours after tube feeds have stopped running)  · Repeat TFTs in 1 week per Endocrine        VTE Pharmacologic Prophylaxis:   Pharmacologic: Heparin  Mechanical VTE Prophylaxis in Place: No    Patient Centered Rounds: I have performed bedside rounds with nursing staff today  Time Spent for Care: 15 minutes  More than 50% of total time spent on counseling and coordination of care as described above  Current Length of Stay: 40 day(s)    Current Patient Status: Inpatient       Code Status: Level 3 - DNAR and DNI      Subjective:   No acute distress    Objective:     Vitals:   Temp (24hrs), Av 1 °F (36 7 °C), Min:98 °F (36 7 °C), Max:98 1 °F (36 7 °C)    Temp:  [98 °F (36 7 °C)-98 1 °F (36 7 °C)] 98 °F (36 7 °C)  HR:  [80-97] 97  Resp:  [16-20] 20  BP: ()/(42-51) 108/51  SpO2:  [90 %-93 %] 90 %  Body mass index is 18 28 kg/m²  Input and Output Summary (last 24 hours):     No intake or output data in the 24 hours ending 10/10/22 0836    Physical Exam:     Physical Exam  HENT:      Head: Normocephalic and atraumatic  Cardiovascular:      Rate and Rhythm: Normal rate  Pulmonary:      Effort: Pulmonary effort is normal       Breath sounds: Normal breath sounds  Abdominal:      General: Abdomen is flat  Palpations: Abdomen is soft  Musculoskeletal:         General: No swelling or tenderness  Normal range of motion  Cervical back: Normal range of motion and neck supple  Skin:     General: Skin is warm and dry  Neurological:      General: No focal deficit present  Mental Status: She is alert     Psychiatric:         Mood and Affect: Mood normal          Behavior: Behavior normal          Additional Data:     Labs:    Results from last 7 days   Lab Units 10/10/22  0500   WBC Thousand/uL 14 78*   HEMOGLOBIN g/dL 8 2*   HEMATOCRIT % 26 3*   PLATELETS Thousands/uL 372   NEUTROS PCT % 68   LYMPHS PCT % 13*   MONOS PCT % 6   EOS PCT % 11*     Results from last 7 days   Lab Units 10/10/22  0500   POTASSIUM mmol/L 5 0   CHLORIDE mmol/L 100   CO2 mmol/L 25   BUN mg/dL 33*   CREATININE mg/dL 1 74*   CALCIUM mg/dL 8 9           * I Have Reviewed All Lab Data Listed Above  * Additional Pertinent Lab Tests Reviewed: All Labs Within Last 24 Hours Reviewed        Recent Cultures (last 7 days):     Results from last 7 days   Lab Units 10/09/22  1418 10/09/22  1417   BLOOD CULTURE  Received in Microbiology Lab  Culture in Progress  Received in Microbiology Lab  Culture in Progress         Last 24 Hours Medication List:   Current Facility-Administered Medications   Medication Dose Route Frequency Provider Last Rate   • acetaminophen  975 mg Oral Q6H PRN Inga Mathew PA-C     • ALPRAZolam  0 25 mg Oral TID PRN Uriel Floyd DO     • alteplase  2 mg Intracatheter Once Larry Sevilla PA-C     • bisacodyl  10 mg Rectal Daily PRN Fernando Fleming MD     • bisacodyl  10 mg Rectal Once Wilfred Babinski, MD     • clonazePAM  3 mg Oral HS Jovita Mcdermott, DO     • folic acid  1 mg Oral Daily Jovita Mcdermott DO     • guaiFENesin  600 mg Oral Q12H [address] Inga Mathew PA-C     • heparin (porcine)  5,000 Units Subcutaneous [facility] Uriel Floyd DO     • levothyroxine  112 mcg Oral Q24H [place] Devon Jordan MD     • metoclopramide  10 mg Intravenous Q6H PRN Fernando Fleming MD     • midodrine  10 mg Oral TID AC TOLU Orellana     • ondansetron  4 mg Intravenous Q6H PRN Gricel Lopez DO     • oxyCODONE  5 mg Oral Q6H Fernando Fleming MD     • pantoprazole  40 mg Oral BID AC Kaylan Arroyo DO     • polyethylene glycol  17 g Per PEG Tube BID Gricel Lopez DO     • senna  2 tablet Oral HS Jenna Hester MD     • sodium bicarbonate  650 mg Oral BID after meals TOLU Cohn     • sodium chloride  2 g Oral TID Kylah Carranza DO     • thiamine  100 mg Oral Daily Jovita Mcdermott DO     • traZODone  150 mg Oral HS Sara Montez DO          Today, Patient Was Seen By: Sunil Wan    ** Please Note: Dictation voice to text software may have been used in the creation of this document   **

## 2022-10-10 NOTE — PROGRESS NOTES
Progress Note - Infectious Disease   Noemy Almanzar 76 y o  female MRN: [id number]  Unit/Bed#: [facility] 323-01 Encounter: [id number]      Impression/Plan:  1  Sepsis, recurrent   New isolated fever on 10/6/2022 of unclear significance  No workup done for the isolated fever spike but the patient was started on intravenous vancomycin  Patient previously completed previous antibiotic course for UTI as below  Patient remains stable without any localizing symptomatology  Monitor off all antibiotics  If fever recurs, recheck blood cultures x2 sets, check UA C&S, check chest x-ray, and check procalcitonin level  Recheck CBC with diff, CMP  Additional supportive care as per primary     2  Urinary tract infection   UA with some pyuria and patient reporting dysuria   Urine cultures noted to be polymicrobial with some degree of resistance   Suspect partial treatment leading to declining white count but continued fever   Patient has completed his treatment course with antibiotics and has been stable off all antibiotics except the isolated fever spike  Patient has chronic urinary symptoms that are difficult to interpret  No additional antibiotics  Monitor for current symptoms  Continue to trend fever curve/WBC  If fever recurs, check urinalysis and culture     3  Abnormal abdominal imaging   Patient noted to have some abnormal changes to the gallbladder on imaging   Recent ultrasound unremarkable   Alkaline phosphatase borderline   Patient only localizes discomfort near the PEG tube, adjusted by GI   Repeat CT unremarkable  Serial exams     4  Recent C diff   Repeat PCR testing negative    Patient has completed C diff prophylaxis and has not had recurrence of diarrhea      5  Progressing anemia and recent GI bleed   Hemoglobin downtrended this weekend   Reportedly had some increased stool output   Continue to monitor hemoglobin and stool output   GI evaluations noted      6  Elevated serum creatinine with eosinophilia   Unclear etiology  Renal function stable  Fluid hydration as per primary  Continue enteral feeding  Consider review of medications as above  Repeat chemistry tomorrow    Discussed the above management plan with the primary service    Antibiotics:  None    Subjective:  Patient has no fever, chills, sweats; no nausea, vomiting, diarrhea; no cough, shortness of breath; no pain  No new symptoms  She seems in good spirits  Objective:  Vitals:  Temp:  [98 °F (36 7 °C)-98 4 °F (36 9 °C)] 98 4 °F (36 9 °C)  HR:  [80-97] 88  Resp:  [16-20] 20  BP: ()/(42-51) 107/50  SpO2:  [90 %-93 %] 93 %  Temp (24hrs), Av 2 °F (36 8 °C), Min:98 °F (36 7 °C), Max:98 4 °F (36 9 °C)  Current: Temperature: 98 4 °F (36 9 °C)    Physical Exam:   General Appearance:  Elderly, frail, debilitated, interactive, nontoxic, no acute distress  Throat: Oropharynx moist without lesions  Lungs:   Clear to auscultation bilaterally; no wheezes, rhonchi or rales; respirations unlabored   Heart:  RRR; no murmur, rub or gallop   Abdomen:   Soft, non-tender, non-distended, positive bowel sounds  Extremities: No clubbing, cyanosis or edema   Skin: No new rashes or lesions  No draining wounds noted  Labs, Imaging, & Other studies:   All pertinent labs and imaging studies were personally reviewed  Results from last 7 days   Lab Units 10/10/22  0500 10/09/22  1416 10/07/22  0554   WBC Thousand/uL 14 78* 13 79* 14 20*   HEMOGLOBIN g/dL 8 2* 8 3* 7 3*   PLATELETS Thousands/uL 372 652* 539*     Results from last 7 days   Lab Units 10/10/22  0500 10/09/22  1416 10/08/22  0500   SODIUM mmol/L 130* 131* 133*   POTASSIUM mmol/L 5 0 5 5* 5 6*   CHLORIDE mmol/L 100 101 104   CO2 mmol/L 25 23 24   BUN mg/dL 33* 31* 32*   CREATININE mg/dL 1 74* 1 71* 1 53*   EGFR ml/min/1 73sq m 27 28 32   CALCIUM mg/dL 8 9 8 8 8 5     Results from last 7 days   Lab Units 10/09/22  1418 10/09/22  1417   BLOOD CULTURE  Received in Microbiology Lab  Culture in Progress  Received in Microbiology Lab  Culture in Progress

## 2022-10-11 PROBLEM — R50.9 FEVER: Status: RESOLVED | Noted: 2022-05-27 | Resolved: 2022-10-11

## 2022-10-11 NOTE — NURSING NOTE
Pt was found awake and on the floor by another RN while yelling "Help" at approximately 4:06 PM, code rodriguez jacobs called at 4:08 PM     Madiha Orf   6:26 PM

## 2022-10-11 NOTE — PLAN OF CARE
Problem: Potential for Falls  Goal: Patient will remain free of falls  Description: INTERVENTIONS:  - Educate patient/family on patient safety including physical limitations  - Instruct patient to call for assistance with activity   - Consult OT/PT to assist with strengthening/mobility   - Keep Call bell within reach  - Keep bed low and locked with side rails adjusted as appropriate  - Keep care items and personal belongings within reach  - Initiate and maintain comfort rounds  - Make Fall Risk Sign visible to staff  - Offer Toileting every    Hours, in advance of need  - Initiate/Maintain   alarm  - Obtain necessary fall risk management equipment:       - Apply yellow socks and bracelet for high fall risk patients  - Consider moving patient to room near nurses station  Outcome: Progressing     Problem: MOBILITY - ADULT  Goal: Maintain or return to baseline ADL function  Description: INTERVENTIONS:  -  Assess patient's ability to carry out ADLs; assess patient's baseline for ADL function and identify physical deficits which impact ability to perform ADLs (bathing, care of mouth/teeth, toileting, grooming, dressing, etc )  - Assess/evaluate cause of self-care deficits   - Assess range of motion  - Assess patient's mobility; develop plan if impaired  - Assess patient's need for assistive devices and provide as appropriate  - Encourage maximum independence but intervene and supervise when necessary  - Involve family in performance of ADLs  - Assess for home care needs following discharge   - Consider OT consult to assist with ADL evaluation and planning for discharge  - Provide patient education as appropriate  Outcome: Progressing  Goal: Maintains/Returns to pre admission functional level  Description: INTERVENTIONS:  - Perform BMAT or MOVE assessment daily    - Set and communicate daily mobility goal to care team and patient/family/caregiver     - Collaborate with rehabilitation services on mobility goals if consulted  - Perform Range of Motion    times a day  - Reposition patient every    hours    - Dangle patient      times a day  - Stand patient    times a day  - Ambulate patient    times a day  - Out of bed to chair    times a day   - Out of bed for meals    times a day  - Out of bed for toileting  - Record patient progress and toleration of activity level   Outcome: Progressing     Problem: INFECTION - ADULT  Goal: Absence or prevention of progression during hospitalization  Description: INTERVENTIONS:  - Assess and monitor for signs and symptoms of infection  - Monitor lab/diagnostic results  - Monitor all insertion sites, i e  indwelling lines, tubes, and drains  - Monitor endotracheal if appropriate and nasal secretions for changes in amount and color  - [place] appropriate cooling/warming therapies per order  - Administer medications as ordered  - Instruct and encourage patient and family to use good hand hygiene technique  - Identify and instruct in appropriate isolation precautions for identified infection/condition  Outcome: Progressing  Goal: Absence of fever/infection during neutropenic period  Description: INTERVENTIONS:  - Monitor WBC    Outcome: Progressing     Problem: SAFETY ADULT  Goal: Patient will remain free of falls  Description: INTERVENTIONS:  - Educate patient/family on patient safety including physical limitations  - Instruct patient to call for assistance with activity   - Consult OT/PT to assist with strengthening/mobility   - Keep Call bell within reach  - Keep bed low and locked with side rails adjusted as appropriate  - Keep care items and personal belongings within reach  - Initiate and maintain comfort rounds  - Make Fall Risk Sign visible to staff  - Offer Toileting every      Hours, in advance of need  - Initiate/Maintain     alarm  - Obtain necessary fall risk management equipment:       - Apply yellow socks and bracelet for high fall risk patients  - Consider moving patient to room near nurses station  Outcome: Progressing  Goal: Maintain or return to baseline ADL function  Description: INTERVENTIONS:  -  Assess patient's ability to carry out ADLs; assess patient's baseline for ADL function and identify physical deficits which impact ability to perform ADLs (bathing, care of mouth/teeth, toileting, grooming, dressing, etc )  - Assess/evaluate cause of self-care deficits   - Assess range of motion  - Assess patient's mobility; develop plan if impaired  - Assess patient's need for assistive devices and provide as appropriate  - Encourage maximum independence but intervene and supervise when necessary  - Involve family in performance of ADLs  - Assess for home care needs following discharge   - Consider OT consult to assist with ADL evaluation and planning for discharge  - Provide patient education as appropriate  Outcome: Progressing  Goal: Maintains/Returns to pre admission functional level  Description: INTERVENTIONS:  - Perform BMAT or MOVE assessment daily    - Set and communicate daily mobility goal to care team and patient/family/caregiver  - Collaborate with rehabilitation services on mobility goals if consulted  - Perform Range of Motion    times a day  - Reposition patient every hours    - Dangle patient      times a day  - Stand patient    times a day  - Ambulate patient      times a day  - Out of bed to chair times a day   - Out of bed for meals    times a day  - Out of bed for toileting  - Record patient progress and toleration of activity level   Outcome: Progressing     Problem: DISCHARGE PLANNING  Goal: Discharge to home or other facility with appropriate resources  Description: INTERVENTIONS:  - Identify barriers to discharge w/patient and caregiver  - Arrange for needed discharge resources and transportation as appropriate  - Identify discharge learning needs (meds, wound care, etc )  - Arrange for interpretive services to assist at discharge as needed  - Refer to Case Management Department for coordinating discharge planning if the patient needs post-hospital services based on physician/advanced practitioner order or complex needs related to functional status, cognitive ability, or social support system  Outcome: Progressing     Problem: Knowledge Deficit  Goal: Patient/family/caregiver demonstrates understanding of disease process, treatment plan, medications, and discharge instructions  Description: Complete learning assessment and assess knowledge base    Interventions:  - Provide teaching at level of understanding  - Provide teaching via preferred learning methods  Outcome: Progressing     Problem: SKIN/TISSUE INTEGRITY - ADULT  Goal: Skin Integrity remains intact(Skin Breakdown Prevention)  Description: Assess:  -Perform Gabe assessment every       -Clean and moisturize skin every     -Inspect skin when repositioning, toileting, and assisting with ADLS  -Assess under medical devices such as    every     -Assess extremities for adequate circulation and sensation     Bed Management:  -Have minimal linens on bed & keep smooth, unwrinkled  -Change linens as needed when moist or perspiring  -Avoid sitting or lying in one position for more than      hours while in bed  -Keep HOB at   degrees     Toileting:  -Offer bedside commode  -Assess for incontinence every         -Use incontinent care products after each incontinent episode such as         Activity:  -Mobilize patient    times a day  -Encourage activity and walks on unit  -Encourage or provide ROM exercises   -Turn and reposition patient every    Hours  -Use appropriate equipment to lift or move patient in bed  -Instruct/ Assist with weight shifting every    when out of bed in chair  -Consider limitation of chair time    hour intervals    Skin Care:  -Avoid use of baby powder, tape, friction and shearing, hot water or constrictive clothing  -Relieve pressure over bony prominences using     -Do not massage red bony areas    Next Steps:  -Teach patient strategies to minimize risks such as      -Consider consults to  interdisciplinary teams such as     Outcome: Progressing  Goal: Incision(s), wounds(s) or drain site(s) healing without S/S of infection  Description: INTERVENTIONS  - Assess and document dressing, incision, wound bed, drain sites and surrounding tissue  - Provide patient and family education  - Perform skin care/dressing changes every     Outcome: Progressing  Goal: Pressure injury heals and does not worsen  Description: Interventions:  - Implement low air loss mattress or specialty surface (Criteria met)  - Apply silicone foam dressing  - Instruct/assist with weight shifting every    minutes when in chair   - Limit chair time to      hour intervals  - Use special pressure reducing interventions such as    when in chair   - Apply fecal or urinary incontinence containment device   - Perform passive or active ROM every       - Turn and reposition patient & offload bony prominences every    hours   - Utilize friction reducing device or surface for transfers   - Consider consults to  interdisciplinary teams such as       - Use incontinent care products after each incontinent episode such as     - Consider nutrition services referral as needed  Outcome: Progressing     Problem: Prexisting or High Potential for Compromised Skin Integrity  Goal: Skin integrity is maintained or improved  Description: INTERVENTIONS:  - Identify patients at risk for skin breakdown  - Assess and monitor skin integrity  - Assess and monitor nutrition and hydration status  - Monitor labs   - Assess for incontinence   - Turn and reposition patient  - Assist with mobility/ambulation  - Relieve pressure over bony prominences  - Avoid friction and shearing  - Provide appropriate hygiene as needed including keeping skin clean and dry  - Evaluate need for skin moisturizer/barrier cream  - Collaborate with interdisciplinary team   - Patient/family teaching  - Consider wound care consult   Outcome: Progressing     Problem: Nutrition/Hydration-ADULT  Goal: Nutrient/Hydration intake appropriate for improving, restoring or maintaining nutritional needs  Description: Monitor and assess patient's nutrition/hydration status for malnutrition  Collaborate with interdisciplinary team and initiate plan and interventions as ordered  Monitor patient's weight and dietary intake as ordered or per policy  Utilize nutrition screening tool and intervene as necessary  Determine patient's food preferences and provide high-protein, high-caloric foods as appropriate       INTERVENTIONS:  - Monitor oral intake, urinary output, labs, and treatment plans  - Assess nutrition and hydration status and recommend course of action  - Evaluate amount of meals eaten  - Assist patient with eating if necessary   - Allow adequate time for meals  - Recommend/ encourage appropriate diets, oral nutritional supplements, and vitamin/mineral supplements  - Order, calculate, and assess calorie counts as needed  - Recommend, monitor, and adjust tube feedings and TPN/PPN based on assessed needs  - Assess need for intravenous fluids  - Provide specific nutrition/hydration education as appropriate  - Include patient/family/caregiver in decisions related to nutrition  Outcome: Progressing

## 2022-10-11 NOTE — PLAN OF CARE
Problem: Potential for Falls  Goal: Patient will remain free of falls  Description: INTERVENTIONS:  - Educate patient/family on patient safety including physical limitations  - Instruct patient to call for assistance with activity   - Consult OT/PT to assist with strengthening/mobility   - Keep Call bell within reach  - Keep bed low and locked with side rails adjusted as appropriate  - Keep care items and personal belongings within reach  - Initiate and maintain comfort rounds  - Make Fall Risk Sign visible to staff  - Offer Toileting every  Hours, in advance of need  - Initiate/Maintain alarm  - Obtain necessary fall risk management equipment:   - Apply yellow socks and bracelet for high fall risk patients  - Consider moving patient to room near nurses station  Outcome: Not Progressing     Problem: MOBILITY - ADULT  Goal: Maintain or return to baseline ADL function  Description: INTERVENTIONS:  -  Assess patient's ability to carry out ADLs; assess patient's baseline for ADL function and identify physical deficits which impact ability to perform ADLs (bathing, care of mouth/teeth, toileting, grooming, dressing, etc )  - Assess/evaluate cause of self-care deficits   - Assess range of motion  - Assess patient's mobility; develop plan if impaired  - Assess patient's need for assistive devices and provide as appropriate  - Encourage maximum independence but intervene and supervise when necessary  - Involve family in performance of ADLs  - Assess for home care needs following discharge   - Consider OT consult to assist with ADL evaluation and planning for discharge  - Provide patient education as appropriate  Outcome: Not Progressing  Goal: Maintains/Returns to pre admission functional level  Description: INTERVENTIONS:  - Perform BMAT or MOVE assessment daily    - Set and communicate daily mobility goal to care team and patient/family/caregiver     - Collaborate with rehabilitation services on mobility goals if consulted  - Perform Range of Motion  times a day  - Reposition patient every  hours    - Dangle patient  times a day  - Stand patient  times a day  - Ambulate patient  times a day  - Out of bed to chair  times a day   - Out of bed for meals  times a day  - Out of bed for toileting  - Record patient progress and toleration of activity level   Outcome: Not Progressing     Problem: INFECTION - ADULT  Goal: Absence or prevention of progression during hospitalization  Description: INTERVENTIONS:  - Assess and monitor for signs and symptoms of infection  - Monitor lab/diagnostic results  - Monitor all insertion sites, i e  indwelling lines, tubes, and drains  - Monitor endotracheal if appropriate and nasal secretions for changes in amount and color  - Platinum appropriate cooling/warming therapies per order  - Administer medications as ordered  - Instruct and encourage patient and family to use good hand hygiene technique  - Identify and instruct in appropriate isolation precautions for identified infection/condition  Outcome: Not Progressing  Goal: Absence of fever/infection during neutropenic period  Description: INTERVENTIONS:  - Monitor WBC    Outcome: Not Progressing     Problem: SAFETY ADULT  Goal: Patient will remain free of falls  Description: INTERVENTIONS:  - Educate patient/family on patient safety including physical limitations  - Instruct patient to call for assistance with activity   - Consult OT/PT to assist with strengthening/mobility   - Keep Call bell within reach  - Keep bed low and locked with side rails adjusted as appropriate  - Keep care items and personal belongings within reach  - Initiate and maintain comfort rounds  - Make Fall Risk Sign visible to staff  - Offer Toileting every  Hours, in advance of need  - Initiate/Maintain alarm  - Obtain necessary fall risk management equipment:   - Apply yellow socks and bracelet for high fall risk patients  - Consider moving patient to room near nurses station  Outcome: Not Progressing  Goal: Maintain or return to baseline ADL function  Description: INTERVENTIONS:  -  Assess patient's ability to carry out ADLs; assess patient's baseline for ADL function and identify physical deficits which impact ability to perform ADLs (bathing, care of mouth/teeth, toileting, grooming, dressing, etc )  - Assess/evaluate cause of self-care deficits   - Assess range of motion  - Assess patient's mobility; develop plan if impaired  - Assess patient's need for assistive devices and provide as appropriate  - Encourage maximum independence but intervene and supervise when necessary  - Involve family in performance of ADLs  - Assess for home care needs following discharge   - Consider OT consult to assist with ADL evaluation and planning for discharge  - Provide patient education as appropriate  Outcome: Not Progressing  Goal: Maintains/Returns to pre admission functional level  Description: INTERVENTIONS:  - Perform BMAT or MOVE assessment daily    - Set and communicate daily mobility goal to care team and patient/family/caregiver  - Collaborate with rehabilitation services on mobility goals if consulted  - Perform Range of Motion  times a day  - Reposition patient every  hours    - Dangle patient  times a day  - Stand patient  times a day  - Ambulate patient  times a day  - Out of bed to chair  times a day   - Out of bed for meals  times a day  - Out of bed for toileting  - Record patient progress and toleration of activity level   Outcome: Not Progressing     Problem: DISCHARGE PLANNING  Goal: Discharge to home or other facility with appropriate resources  Description: INTERVENTIONS:  - Identify barriers to discharge w/patient and caregiver  - Arrange for needed discharge resources and transportation as appropriate  - Identify discharge learning needs (meds, wound care, etc )  - Arrange for interpretive services to assist at discharge as needed  - Refer to Case Management Department for coordinating discharge planning if the patient needs post-hospital services based on physician/advanced practitioner order or complex needs related to functional status, cognitive ability, or social support system  Outcome: Not Progressing     Problem: Knowledge Deficit  Goal: Patient/family/caregiver demonstrates understanding of disease process, treatment plan, medications, and discharge instructions  Description: Complete learning assessment and assess knowledge base    Interventions:  - Provide teaching at level of understanding  - Provide teaching via preferred learning methods  Outcome: Not Progressing     Problem: SKIN/TISSUE INTEGRITY - ADULT  Goal: Skin Integrity remains intact(Skin Breakdown Prevention)  Description: Assess:  -Perform Gabe assessment every   -Clean and moisturize skin every   -Inspect skin when repositioning, toileting, and assisting with ADLS  -Assess under medical devices such as  every   -Assess extremities for adequate circulation and sensation     Bed Management:  -Have minimal linens on bed & keep smooth, unwrinkled  -Change linens as needed when moist or perspiring  -Avoid sitting or lying in one position for more than  hours while in bed  -Keep HOB at degrees     Toileting:  -Offer bedside commode  -Assess for incontinence every   -Use incontinent care products after each incontinent episode such as     Activity:  -Mobilize patient  times a day  -Encourage activity and walks on unit  -Encourage or provide ROM exercises   -Turn and reposition patient every  Hours  -Use appropriate equipment to lift or move patient in bed  -Instruct/ Assist with weight shifting every when out of bed in chair  -Consider limitation of chair time  hour intervals    Skin Care:  -Avoid use of baby powder, tape, friction and shearing, hot water or constrictive clothing  -Relieve pressure over bony prominences using   -Do not massage red bony areas    Next Steps:  -Teach patient strategies to minimize risks such as    -Consider consults to  interdisciplinary teams such as   Outcome: Not Progressing  Goal: Incision(s), wounds(s) or drain site(s) healing without S/S of infection  Description: INTERVENTIONS  - Assess and document dressing, incision, wound bed, drain sites and surrounding tissue  - Provide patient and family education  - Perform skin care/dressing changes every   Outcome: Not Progressing  Goal: Pressure injury heals and does not worsen  Description: Interventions:  - Implement low air loss mattress or specialty surface (Criteria met)  - Apply silicone foam dressing  - Instruct/assist with weight shifting every  minutes when in chair   - Limit chair time to  hour intervals  - Use special pressure reducing interventions such as  when in chair   - Apply fecal or urinary incontinence containment device   - Perform passive or active ROM every   - Turn and reposition patient & offload bony prominences every  hours   - Utilize friction reducing device or surface for transfers   - Consider consults to  interdisciplinary teams such as   - Use incontinent care products after each incontinent episode such as   - Consider nutrition services referral as needed  Outcome: Not Progressing     Problem: Prexisting or High Potential for Compromised Skin Integrity  Goal: Skin integrity is maintained or improved  Description: INTERVENTIONS:  - Identify patients at risk for skin breakdown  - Assess and monitor skin integrity  - Assess and monitor nutrition and hydration status  - Monitor labs   - Assess for incontinence   - Turn and reposition patient  - Assist with mobility/ambulation  - Relieve pressure over bony prominences  - Avoid friction and shearing  - Provide appropriate hygiene as needed including keeping skin clean and dry  - Evaluate need for skin moisturizer/barrier cream  - Collaborate with interdisciplinary team   - Patient/family teaching  - Consider wound care consult   Outcome: Not Progressing     Problem: Nutrition/Hydration-ADULT  Goal: Nutrient/Hydration intake appropriate for improving, restoring or maintaining nutritional needs  Description: Monitor and assess patient's nutrition/hydration status for malnutrition  Collaborate with interdisciplinary team and initiate plan and interventions as ordered  Monitor patient's weight and dietary intake as ordered or per policy  Utilize nutrition screening tool and intervene as necessary  Determine patient's food preferences and provide high-protein, high-caloric foods as appropriate       INTERVENTIONS:  - Monitor oral intake, urinary output, labs, and treatment plans  - Assess nutrition and hydration status and recommend course of action  - Evaluate amount of meals eaten  - Assist patient with eating if necessary   - Allow adequate time for meals  - Recommend/ encourage appropriate diets, oral nutritional supplements, and vitamin/mineral supplements  - Order, calculate, and assess calorie counts as needed  - Recommend, monitor, and adjust tube feedings and TPN/PPN based on assessed needs  - Assess need for intravenous fluids  - Provide specific nutrition/hydration education as appropriate  - Include patient/family/caregiver in decisions related to nutrition  Outcome: Not Progressing

## 2022-10-11 NOTE — PROGRESS NOTES
[facility]  Progress Note - Jose Monzon 1945, 76 y o  female MRN: [id number]  Unit/Bed#: [facility] 323-01 Encounter: [id number]  Primary Care Provider: Lyudmila Carvalho MD   Date and time admitted to hospital: 8/30/2022 11:49 PM    * Sepsis [facility])  Assessment & Plan  · Initially met criteria with fever, tachypnea, tachycardia, and leukocytosis on admission  Sepsis secondary to recurrent bacteremia and PICC line infections  · PICC line has been removed and patient had PEG tube placed for nutritional support  · Finished full course of ertapenem for ESBL UTI  · Patient with recurrent fever 10/7/22-  · Rule out recurrent bloodstream infection  Started empiric vancomycin for now  ·   Discussed with nursing  Blood cultures were not drawn yet on the 7th, finally sent on 9th    · Discussed with infectious disease  Will observe off of antibiotic therapy follow-up on results of cultures- sent on 10/9/22- NGTD    Bacteremia  Assessment & Plan  · Recent hx of staph epi bacteremia in 7/2022, presumed due to picc line  · Recurrence 1/2 set staph epi, 2nd set without growth  Was treated with intravenous vancomycin for presumed line infection  · S/p TAVIA (9/9) no evidence of vegetation  · Blood cultures had been negative however repeated again due to isolated fever which are negative to date  · PICC and TPN discontinued, now has PEG tube  · Monitor off abx    NOAH (acute kidney injury) ([facility] [address] )  Assessment & Plan  · Renal function appears to have stabilized around creatinine of 1 5-1 7  · Creatinine up-trending today -  · Possibly new baseline per Nephrology  · Monitor BMP    H/O bariatric surgery - bypass  Assessment & Plan  · History of Savage-en-Y gastric bypass  Has had significant issues over the last 6 months or so with chronic malnutrition and an anastomotic ulcerations    When last seen by bariatric surgery, it was recommended to continue on prolonged TPN for nutritional optimization with possible revision of the Savage-en-Y in the future  · Given her recurrence PICC line infections and bacteremia, TPN has been discontinued  · Continue tube feeds for nutritional optimization  · Will need significant improvement before consideration can be given to further surgical intervention      Hyponatremia  Assessment & Plan    Nephrology following   Hyponatremia the setting of poor solute intake with gastric bypass, continue with salt tablets 2 g t i d , minimize free water flushes with tube feeds  · Follow daily labs  ·   Last serum sodium 130 yesterday        Hyperkalemia  Assessment & Plan    · Nephrology following - giving calcium gluconate, D50/insulin, sodium bicarb given October 5th  · Low-potassium pleasure feed diet  · Change tube feeds to Nephro  ·  Nutrition Services for goal rate    Urinary retention  Assessment & Plan  · Has required intermittent catheterizations however now voiding spontaneously  · Continue retention protocol    Hypotension  Assessment & Plan  · Continue midodrine 10 mg t i d     Acute encephalopathy  Assessment & Plan  · Patient has waxing waning mental status   · Cognition and mental status issues multifactorial in nature stemming from prolonged illness, numerous hospitalizations, chronic malnutrition, and chronic/recurrent infections  · Monitor clinically  · Delirium precautions  · Neuropsych consulted for capacity eval      Goals of care, counseling/discussion  Assessment & Plan  · Evaluated by palliative care  Wants to continue medical directed treatments with limits of DNR/DNI  · Spoke with patient's brother Howie Roche today who is power of  ( but financial power of ), We discussed goals of care  BrotherHowie agrees that we need to address palliative/comfort option again given her failure to thrive  He will discuss with rest of the siblings and they all will discussed with patient    In next few days he will update us wether they  would like to have a meeting with palliative/hospice again  Acute on chronic anemia  Assessment & Plan  · 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  · S/p recent EGD in 7/22 revealing: Residual marginal ulcer of the gastrojejunostomy anastomosis with improved size  · S/p EGD 9/2 revealing: Large, cratered ulcer in the gastrojejunal anastomosis   · Status post 1 unit PRBCs this admission  · Continue PPI b i d      Moderate protein-calorie malnutrition ([address] )  Assessment & Plan  Malnutrition Findings:   Adult Malnutrition type: Chronic illness  Adult Degree of Malnutrition: Other severe protein calorie malnutrition  Malnutrition Characteristics: Weight loss, Muscle loss       360 Statement: Severe/Chronic malnutrition r/t condition, as evidenced by 4 8% wt loss x < 1 week (9/2/22: 125#, 9/5/22: 119#), and severe muscle mass depletion (temples/clavicle)  Treated with liberalized diet and nutrition supplements, possibly nutrition support pending goals of care    BMI Findings: Body mass index is 18 28 kg/m²  · Continue tube feeds    Fall  Assessment & Plan  Today on 10/11/22 afternoon and rapid response was called for fall  -as per RR note and per patient, she was leaning forward , getting gou tof the bed  to grab a pillow, her leg got caught  and she fell  -denies head strike, loss of consciousness, pain, any lightheadaness, diaphoresis before or after fall  -patient moving all extremities after  Fall  - vital signs stable  -patient was placed on soft C-collar and CT head as well as cervical spine was obtained stat-result showed no fracture or subluxation  -fall precaution  -d/w brother Millie Santos and updated her status/incident today-   Also discussed regardign GOC    Abnormal CT scan  Assessment & Plan  · Noted on CT C/A/P 9/26  - Focal thickening of the lower portion of the endometrium    While this is not expected in a patient of this age, this appears stable from 2016 suggesting a benign etiology  If there is clinical concern, follow-up ultrasound is recommended  - Small left upper lobe nodules, one of which is new from 2022  Given the history of smoking, a follow-up chest CT is recommended in 12 months  · This was d/w sister and brother POA over phone by prior provider     Hypothyroidism  Assessment & Plan  · TSH elevated and fT4 low  · Endocrinology consulted - levothyroxine timing was adjusted to 2pm per Endocrinology recs (4 hours after tube feeds have stopped running)  · Repeat TFTs in 1 week per Endocrine        VTE Pharmacologic Prophylaxis: VTE Score: 3 Moderate Risk (Score 3-4) - Pharmacological DVT Prophylaxis Ordered: heparin  Patient Centered Rounds: I performed bedside rounds with nursing staff today  Discussions with Specialists or Other Care Team Provider: CC    Education and Discussions with Family / Patient: Updated  (brother) via phone  Time Spent for Care: 30 minutes  More than 50% of total time spent on counseling and coordination of care as described above  Current Length of Stay: 41 day(s)  Current Patient Status: Inpatient   Certification Statement: The patient will continue to require additional inpatient hospital stay due to on going medical issues/ [address] discussion  Discharge Plan: Anticipate discharge in >72 hrs to TBD    Code Status: Level 3 - DNAR and DNI    Subjective:   During morning rounds she was doing fine, denies any new complaints  Later in the afternoon there was a rapid response called for fall  Patient came back from CT head and cervical spine  She stated that she was trying to grab a pillow so she attempted to get out of bed when her foot caught and she fell  She did not complain of any prodromal symptoms, she denies any headache, pain , she denies hitting her head or losing consciousness  She denies any nausea or vomiting      Objective:     Vitals:   Temp (24hrs), Av 2 °F (36 8 °C), Min:97 8 °F (36 6 °C), Max:98 6 °F (37 °C)    Temp:  [97 8 °F (36 6 °C)-98 6 °F (37 °C)] 98 2 °F (36 8 °C)  HR:  [75-98] 98  Resp:  [16-18] 16  BP: ()/(49-59) 121/59  SpO2:  [92 %-97 %] 97 %  Body mass index is 18 28 kg/m²  Input and Output Summary (last 24 hours): Intake/Output Summary (Last 24 hours) at 10/11/2022 1848  Last data filed at 10/11/2022 1801  Gross per 24 hour   Intake 395 ml   Output 1200 ml   Net -805 ml       Physical Exam:   Physical Exam  Vitals and nursing note reviewed  Constitutional:       General: She is not in acute distress  Appearance: She is well-developed  She is ill-appearing  Comments: Frail, cachectic   HENT:      Head: Normocephalic and atraumatic  Mouth/Throat:      Mouth: Mucous membranes are dry  Eyes:      Conjunctiva/sclera: Conjunctivae normal    Cardiovascular:      Rate and Rhythm: Normal rate and regular rhythm  Heart sounds: No murmur heard  Pulmonary:      Effort: Pulmonary effort is normal  No respiratory distress  Breath sounds: Normal breath sounds  Abdominal:      General: Bowel sounds are normal       Palpations: Abdomen is soft  Tenderness: There is no abdominal tenderness  Comments: Peg in place   Musculoskeletal:         General: Normal range of motion  Cervical back: Neck supple  Comments: Muscle wasting   Skin:     General: Skin is warm and dry  Neurological:      General: No focal deficit present  Mental Status: She is alert  Mental status is at baseline            Additional Data:     Labs:  Results from last 7 days   Lab Units 10/10/22  0500   WBC Thousand/uL 14 78*   HEMOGLOBIN g/dL 8 2*   HEMATOCRIT % 26 3*   PLATELETS Thousands/uL 372   NEUTROS PCT % 68   LYMPHS PCT % 13*   MONOS PCT % 6   EOS PCT % 11*     Results from last 7 days   Lab Units 10/10/22  0500   SODIUM mmol/L 130*   POTASSIUM mmol/L 5 0   CHLORIDE mmol/L 100   CO2 mmol/L 25   BUN mg/dL 33*   CREATININE mg/dL 1 74*   ANION GAP mmol/L 5   CALCIUM mg/dL 8 9   GLUCOSE RANDOM mg/dL 106         Results from last 7 days   Lab Units 10/11/22  1610 10/11/22  1239 10/11/22  0640 10/11/22  0046 10/10/22  1822 10/10/22  1202 10/10/22  0656 10/09/22  1603 10/09/22  1141 10/09/22  0626 10/08/22  1115 10/08/22  0656   POC GLUCOSE mg/dl 98 117 105 94 93 96 106 99 104 111 99 105               Lines/Drains:  Invasive Devices  Report    Peripheral Intravenous Line  Duration           Peripheral IV 10/08/22 Dorsal (posterior); Right Hand 3 days          Drain  Duration           Gastrostomy/Enterostomy Percutaneous Endoscopic Gastrostomy (PEG) 20 Fr  LUQ 33 days                      Imaging: Reviewed radiology reports from this admission including: CT head and cervical spine    Recent Cultures (last 7 days):   Results from last 7 days   Lab Units 10/09/22  1418 10/09/22  1417   BLOOD CULTURE  No Growth at 48 hrs  No Growth at 48 hrs         Last 24 Hours Medication List:   Current Facility-Administered Medications   Medication Dose Route Frequency Provider Last Rate   • acetaminophen  975 mg Oral Q6H PRN Ale Chew PA-C     • ALPRAZolam  0 25 mg Oral TID PRN Consuelo Geller DO     • alteplase  2 mg Intracatheter Once Larry Sevilla PA-C     • bisacodyl  10 mg Rectal Daily PRN Ana Espino MD     • bisacodyl  10 mg Rectal Once Mandy Nichole MD     • clonazePAM  3 mg Oral HS Jovita Mcdermott DO     • folic acid  1 mg Oral Daily Jovita Mcdermott DO     • guaiFENesin  600 mg Oral Q12H [facility] Ale Chew PA-C     • heparin (porcine)  5,000 Units Subcutaneous [facility] Consuelo Geller DO     • levothyroxine  125 mcg Oral Q24H Olu[place]atomisin Yissel Valladares MD     • metoclopramide  10 mg Intravenous Q6H PRN Ana Espino MD     • midodrine  10 mg Oral TID AC TOLU Orellana     • ondansetron  4 mg Intravenous Q6H PRN Dwain Ibanez DO     • oxyCODONE  5 mg Oral Q6H Ana Espino MD     • pantoprazole  40 mg Oral BID AC Kaylan Arroyo DO     • polyethylene glycol  17 g Per PEG Tube BID Cindy Foley DO     • senna  2 tablet Oral HS Geoff Loving MD     • sodium bicarbonate  650 mg Oral BID after meals TOLU Cai     • sodium chloride  2 g Oral TID Priya Thomas DO     • thiamine  100 mg Oral Daily Jovita Mcdermott DO     • traZODone  150 mg Oral HS Cindy Foley DO          Today, Patient Was Seen By: David Curtis    **Please Note: This note may have been constructed using a voice recognition system  **

## 2022-10-11 NOTE — PROGRESS NOTES
10/11/22 1900   Clinical Encounter Type   Visited With Patient   Druze Encounters   Druze Needs Prayer     Patient was distressed after fall from bed

## 2022-10-11 NOTE — PLAN OF CARE
Problem: PHYSICAL THERAPY ADULT  Goal: Performs mobility at highest level of function for planned discharge setting  See evaluation for individualized goals  Description:    Equipment Recommended:  (RW)       See flowsheet documentation for full assessment, interventions and recommendations  Outcome: Not Progressing  Note: Prognosis: Fair  Problem List: Decreased strength, Decreased endurance, Impaired balance, Decreased mobility, Decreased cognition, Decreased safety awareness, Pain  Assessment: Pt seen for PT re-evaluation s/p goal expiration  Pt was initially admitted to [facility] on 8/30/2022 with a primary dx of: sepsis  Prior to admission, pt was receiving rehab at [address] [place] [address], where she was ambulating with RW and Ax1  Pt with multiple readmissions to the hospital from rehab in the last few months; normally pt lives in a house and is independent with ADLs and functional mobility  At time of initial evaluation, pt was requiring Paulo for bed mobility; Paulo for transfers; and Paulo for ambulation 3 ft with RW  Pt has made slow progress towards mobility goals, limited participation with therapy since initial eval  Upon re-evaluation, pt currently is requiring Paulo for bed mobility; modA for transfers and mod-maxA for ambulation 8 ft w/ RW  Pt presents at PT re-eval functioning below baseline and currently w/ overall mobility deficits 2* to: BLE weakness, impaired balance, decreased endurance, gait deviations, pain, decreased activity tolerance compared to baseline, decreased functional mobility tolerance compared to baseline, decreased safety awareness, fall risk, decreased cognition  Pt currently at a fall risk 2* to impairments listed above  Pt will continue to benefit from skilled acute PT interventions to address stated impairments; to maximize functional mobility; for ongoing pt/ family training; and DME needs  At conclusion of PT session pt returned BTB with phone and call bell within reach   Pt denies any further questions at this time  The patient's AM-PAC Basic Mobility Inpatient Short Form Raw Score is 12  A Raw score of less than or equal to 16 suggests the patient may benefit from discharge to post-acute rehabilitation services  Please also refer to the recommendation of the Physical Therapist for safe discharge planning  Recommend rehab upon hospital D/C  Barriers to Discharge: Inaccessible home environment, Decreased caregiver support     PT Discharge Recommendation: Post acute rehabilitation services    See flowsheet documentation for full assessment

## 2022-10-11 NOTE — PLAN OF CARE
Problem: OCCUPATIONAL THERAPY ADULT  Goal: Performs self-care activities at highest level of function for planned discharge setting  See evaluation for individualized goals  Description: Treatment Interventions: ADL retraining, Functional transfer training, Endurance training, UE strengthening/ROM, Cognitive reorientation, Patient/family training, Equipment evaluation/education, Compensatory technique education, Continued evaluation, Energy conservation, Activityengagement          See flowsheet documentation for full assessment, interventions and recommendations  Outcome: Progressing  Note: Limitation: Decreased ADL status, Decreased endurance, Decreased self-care trans, Decreased high-level ADLs  Prognosis: Fair  Assessment: PT SEEN FOR OT TX SESSION WITH FOCUS ON FUNCTIONAL TXFS/MOBILITY, UB/LB DRESSING, FEEDING AND PT EDUCATION  PT INITIALLY VERY IRRITABLE AND AGITATED, REFUSING PARTICIPATION HOWEVER WITH TIME AND THERAPEUTIC USE OF SELF, PT EVENTUALLY AGREEABLE THEN APPRECIATIVE OF THERAPIST  IMPROVEMENT NOTED IN BED MOBILITY TO MIN A  WHILE SEATED EOB, PT COMPLETED UB/LB DRESSING REQUIRING MIN A FOR COMPLETION OF [facility] GOWN AND MOD A FOR DONNING SOCKS INCLUDING ASSIST FOR L SOCK  PT CONTS TO REQUIRE MOD A FOR SIT <->STAND TXFS  IMPROVEMENT NOTED IN FUNCTIONAL MOBILITY WITHIN PT'S ROOM TO MOD-MAX A X1 WITH USE OF RW  PT RETURNED TO SUPINE AND REQUIRED SET-UP WITH ASSIST TO OPEN CONTAINERS FOR LUNCH  PT IS PROGRESSING HOWEVER REMAINS SIGNIFICANTLY LIMITED  CONT TO RECOMMEND INPT REHAB UPON D/C  WILL CONT TO FOLLOW       OT Discharge Recommendation: Post acute rehabilitation services

## 2022-10-11 NOTE — PROGRESS NOTES
10/11/22 1900   Psychosocial   Patient Behaviors/Mood Anxious; Incongruent   Ability to Express Feelings Able to express

## 2022-10-11 NOTE — PROGRESS NOTES
NEPHROLOGY PROGRESS NOTE   Jose Monzon 76 y o  female MRN: [id number]  Unit/Bed#: [facility] 323-01 Encounter: [id number]      ASSESSMENT & PLAN:  1  Elevated creatinine in the setting of recent sepsis with UTI  Baseline creatinine around 0 8-1 2, now kidney function around 1 5-1 6  Creatinine yesterday 1 74, suspect possible NOAH versus new baseline  Follow labs this morning  Follow daily BMP  Avoid relative hypotension  Avoid nephrotoxins  2  Hyponatremia the setting of poor solute intake with gastric bypass, continue with salt tablets 2 g t i d , minimize free water flushes with tube feeds  Follow daily labs  Last serum sodium 130 yesterday    3  Hyperkalemia, improved, follow low-potassium diet, follow daily labs  4  Hypotension, continue with midodrine  5  Sepsis, previously completed antibiotics for UTI  Infection Disease on board, currently monitoring off antibiotics    6  Malnutrition, continue with tube feeds    7  Recent C diff, completed C diff prophylaxis    8  Anemia with recent GI bleeding, monitor H&H, hemoglobin low but stable        SUBJECTIVE:  Patient seen and examined, feeling “like crap”, continues with left-sided abdominal pain on and off, denies any chest pain, no shortness of breath, no nausea, no vomiting    OBJECTIVE:  Current Weight: Weight - Scale:  (Unable to weigh Pt   Bed not accessible and Pt too weak to get OOB )  Vitals:    10/11/22 0700   BP: 104/52   Pulse: 83   Resp: 18   Temp: 98 6 °F (37 °C)   SpO2: 95%       Intake/Output Summary (Last 24 hours) at 10/11/2022 1003  Last data filed at 10/11/2022 0251  Gross per 24 hour   Intake 50 ml   Output 1200 ml   Net -1150 ml       General:  Chronically ill, cachectic, cooperative, in not acute distress  Eyes: conjunctivae pale, anicteric sclerae  ENT: lips and mucous membranes mildly dry  Neck: supple, no JVD  Chest: clear breath sounds bilateral, no crackles, ronchus or wheezings  CVS: distinct S1 & S2, normal rate, regular rhythm  Abdomen: soft, non-tender, non-distended, normoactive bowel sounds, peg tube in place  Extremities: no edema of both legs  Skin: no rash  Neuro: awake, alert, oriented        Medications:    Current Facility-Administered Medications:   •  acetaminophen (TYLENOL) tablet 975 mg, 975 mg, Oral, Q6H PRN, Juan Dodson PA-C, 975 mg at 09/26/22 2315  •  ALPRAZolam [place] Rocha) tablet 0 25 mg, 0 25 mg, Oral, TID PRN, Marck Biggs DO, 0 25 mg at 09/24/22 1616  •  alteplase (CATHFLO) injection 2 mg, 2 mg, Intracatheter, Once, Larry Sevilla PA-C  •  bisacodyl (DULCOLAX) rectal suppository 10 mg, 10 mg, Rectal, Daily PRN, Wero Roberts MD  •  bisacodyl (DULCOLAX) rectal suppository 10 mg, 10 mg, Rectal, Once, Radha Martines MD  •  clonazePAM (KlonoPIN) tablet 3 mg, 3 mg, Oral, HS, Jovita Mcdermott, DO, 3 mg at 91/13/31 0352  •  folic acid (FOLVITE) tablet 1 mg, 1 mg, Oral, Daily, Jovita Mcdermott DO, 1 mg at 10/10/22 0846  •  guaiFENesin (MUCINEX) 12 hr tablet 600 mg, 600 mg, Oral, Q12H [address], Juan Dodson PA-C, 600 mg at 10/10/22 2042  •  heparin (porcine) subcutaneous injection 5,000 Units, 5,000 Units, Subcutaneous, Q8H [address], Marck Biggs, DO, 5,000 Units at 10/11/22 1779  •  levothyroxine tablet 125 mcg, 125 mcg, Oral, Q24H, Oluwatomisin Jie Cuello MD  •  metoclopramide (REGLAN) injection 10 mg, 10 mg, Intravenous, Q6H PRN, Wero Roberts MD  •  midodrine (PROAMATINE) tablet 10 mg, 10 mg, Oral, TID ACJud CRNP, 10 mg at 10/11/22 0555  •  ondansetron (ZOFRAN) injection 4 mg, 4 mg, Intravenous, Q6H PRN, Jovita Mcdermott DO, 4 mg at 09/18/22 1339  •  oxyCODONE (ROXICODONE) IR tablet 5 mg, 5 mg, Oral, Q6H, Wero Roberts MD, 5 mg at 10/11/22 0555  •  pantoprazole (PROTONIX) EC tablet 40 mg, 40 mg, Oral, BID AC, Kaylan Arroyo DO, 40 mg at 10/11/22 0555  •  polyethylene glycol (MIRALAX) packet 17 g, 17 g, Per PEG Tube, BID, Jovita Mcdermott DO, 17 g at 10/10/22 2043  • senna (SENOKOT) tablet 17 2 mg, 2 tablet, Oral, HS, Loida Tenorio MD, 17 2 mg at 10/10/22 2149  •  sodium bicarbonate tablet 650 mg, 650 mg, Oral, BID after meals, Genealma Glasharla, TOLU, 650 mg at 10/10/22 1645  •  sodium chloride tablet 2 g, 2 g, Oral, TID, Ekta Crooked, DO, 2 g at 10/10/22 2042  •  thiamine tablet 100 mg, 100 mg, Oral, Daily, Jovita Mcdermott, DO, 100 mg at 10/10/22 0846  •  traZODone (DESYREL) tablet 150 mg, 150 mg, Oral, HS, Jovita Mcdermott, DO, 150 mg at 10/10/22 2149    Invasive Devices:        Lab Results:   Results from last 7 days   Lab Units 10/10/22  0500 10/09/22  1416 10/08/22  0500 10/07/22  0554 10/05/22  1040 10/04/22  1124   WBC Thousand/uL 14 78* 13 79*  --  14 20*   < > 13 50*   HEMOGLOBIN g/dL 8 2* 8 3*  --  7 3*   < > 8 4*   HEMATOCRIT % 26 3* 26 6*  --  23 9*   < > 26 6*   PLATELETS Thousands/uL 372 652*  --  539*   < > 582*   SODIUM mmol/L 130* 131* 133* 134*   < >  --    POTASSIUM mmol/L 5 0 5 5* 5 6* 5 6*   < >  --    CHLORIDE mmol/L 100 101 104 104   < >  --    CO2 mmol/L 25 23 24 24   < >  --    BUN mg/dL 33* 31* 32* 30*   < >  --    CREATININE mg/dL 1 74* 1 71* 1 53* 1 48*   < >  --    CALCIUM mg/dL 8 9 8 8 8 5 8 4   < >  --    MAGNESIUM mg/dL  --   --   --   --   --  2 4    < > = values in this interval not displayed  Previous work up:  See previous notes      Portions of the record may have been created with voice recognition software  Occasional wrong word or "sound a like" substitutions may have occurred due to the inherent limitations of voice recognition software  Read the chart carefully and recognize, using context, where substitutions have occurred  If you have any questions, please contact the dictating provider

## 2022-10-11 NOTE — NUTRITION
10/11/22 5565   Recommendations/Interventions   Summary Patient stated appetite remains poor on current diet (untouched breakfast tray at bedside), limited documented meal completions noted  Patient dissatisfied with Potassium restriction, requesting orange juice  Reviewed high potassium food/beverage items and rationale for restriction at this time  Cyclic EN providing majority of nutritional needs  No updated weights, requested RN obtain current weight, she reports that she will ask PT to obtain weight when they are working with patient  Interventions/Recommendations Adjust diet order;Adjust EN/ PN;Lab consider order (Specify); Obtain current weight   Recommendations to Provider Secondary to continued poor po intake increase cyclic EN to Nepro @ 55 ml/hr x16 hours with 125 ml free water flush every 4 hours (1584 kcal, 71 gms pro, 1388 ml tv)  Monitor electrolytes and obtain current weight  Consider adjusting diet to regular, patient to limit amount of orange juice to two  4 oz containers per day

## 2022-10-11 NOTE — ASSESSMENT & PLAN NOTE
· Evaluated by palliative care  Wants to continue medical directed treatments with limits of DNR/DNI  · Spoke with patient's brother Apurva Sharma today who is power of  ( but financial power of ), We discussed goals of care  Brother, Apurva Sharma agrees that we need to address palliative/comfort option again given her failure to thrive  He will discuss with rest of the siblings and they all will discussed with patient  In next few days he will update us wether they  would like to have a meeting with palliative/hospice again

## 2022-10-11 NOTE — PROGRESS NOTES
Date of Procedure: 01/28/20


Preoperative Diagnosis: 


1.  Menorrhagia


Postoperative Diagnosis: 


1.  Menorrhagia


Procedure(s) Performed: 


D&C, hysteroscopy, endometrial ablation with NovaSure


Anesthesia: MAC


Surgeon: Danica Randolph


Estimated Blood Loss (ml): 3


IV fluids (ml): 400


Urine output (ml): 120


Pathology: other (Endometrial curettings)


Condition: stable


Disposition: PACU


Operative Findings: 


Uterus sounded to 10 cm, cavity length was 6.5 cm, width 3.9 cm, power 139 W, 

time of ablation 56 seconds.  Adequate ablation after the NovaSure.


Description of Procedure: 


Patient is taken the operating room where general anesthesia was obtained 

without difficulty.  She was prepped and draped in normal sterile fashion dorsal

lithotomy position, legs placed in the candy cane stirrups.  Bladder was drained

of all urine.  Weighted speculum placed in the vagina and the anterior lip the 

cervix was grasped with serial tooth tenaculum.  The uterus sounded to 10 cm and

the cervix under 3.5 cm making the cavity length 6.5 cm.  The cervix was dilated

to #8 Hegar dilator.  Hysteroscopy was then performed.  Both ostia were 

visualized and there was a smooth contour of the uterus.  Sharp curet was then 

gently used to obtain endometrial curettings.  The NovaSure was introduced into 

the uterus with a cavity length of 6.5 cm, width 3.9 cm. after cavity assessment

was passed, the time of ablation was 56 seconds at 139 W.  Hysteroscopy was 

again performed and adequate ablation was noted.  All instruments removed from 

the vagina.  Patient tolerated the procedure well, sponge and instrument counts 

were correct 2 and she was taken to recovery in stable condition. Progress Note - Infectious Disease   Klaudia Serrato 76 y o  female MRN: [id number]  Unit/Bed#: [facility] 323-01 Encounter: [id number]      Impression/Plan:  1  Sepsis, recurrent   New isolated fever on 10/6/2022 of unclear significance   No workup done for the isolated fever spike but the patient was started on intravenous vancomycin   Patient previously completed previous antibiotic course for UTI as below   Patient remains stable without any localizing symptomatology  Monitor off all antibiotics  If fever recurs, recheck blood cultures x2 sets, check UA C&S, check chest x-ray, and check procalcitonin level  Recheck CBC with diff, BMP  Additional supportive care as per primary     2  Urinary tract infection   Urine cultures noted to be polymicrobial with some degree of resistance   Patient has completed his treatment course with antibiotics and has been stable off all antibiotics except the isolated fever spike   Patient has chronic urinary symptoms that are difficult to interpret  No additional antibiotics  Monitor for current symptoms  Continue to trend fever curve/WBC  If fever recurs, check urinalysis and culture     3  Abnormal abdominal imaging   Patient noted to have some abnormal changes to the gallbladder on imaging   Recent ultrasound unremarkable   Alkaline phosphatase borderline   Patient only localizes discomfort near the PEG tube, adjusted by GI   Repeat CT unremarkable  Serial exams     4  Recent C diff   Repeat PCR testing negative   Patient has completed C diff prophylaxis and has not had recurrence of diarrhea      5  Progressing anemia and recent GI bleed   Hemoglobin downtrended this weekend   Reportedly had some increased stool output   Continue to monitor hemoglobin and stool output   GI evaluations noted      6  Elevated serum creatinine with eosinophilia   Unclear etiology  Renal function stable  No labs today    Fluid hydration as per primary  Continue enteral feeding  Consider review of medications as above  Recheck BMP    Discussed the above management plan with the primary service    Antibiotics:  None    Subjective:  Patient has no fever, chills, sweats; no nausea, vomiting, diarrhea; no cough, shortness of breath; no pain  No new symptoms  Objective:  Vitals:  Temp:  [97 8 °F (36 6 °C)-98 7 °F (37 1 °C)] 98 6 °F (37 °C)  HR:  [75-86] 83  Resp:  [18-20] 18  BP: ()/(49-59) 104/52  SpO2:  [92 %-95 %] 95 %  Temp (24hrs), Av 4 °F (36 9 °C), Min:97 8 °F (36 6 °C), Max:98 7 °F (37 1 °C)  Current: Temperature: 98 6 °F (37 °C)    Physical Exam:   General Appearance:  Alert, interactive, nontoxic, no acute distress  Throat: Oropharynx moist without lesions  Lungs:   Clear to auscultation bilaterally; no wheezes, rhonchi or rales; respirations unlabored   Heart:  RRR; no murmur, rub or gallop   Abdomen:   Soft, non-tender, non-distended, positive bowel sounds  Extremities: No clubbing, cyanosis or edema   Skin: No new rashes or lesions  No draining wounds noted  Labs, Imaging, & Other studies:   All pertinent labs and imaging studies were personally reviewed  Results from last 7 days   Lab Units 10/10/22  0500 10/09/22  1416 10/07/22  0554   WBC Thousand/uL 14 78* 13 79* 14 20*   HEMOGLOBIN g/dL 8 2* 8 3* 7 3*   PLATELETS Thousands/uL 372 652* 539*     Results from last 7 days   Lab Units 10/10/22  0500 10/09/22  1416 10/08/22  0500   SODIUM mmol/L 130* 131* 133*   POTASSIUM mmol/L 5 0 5 5* 5 6*   CHLORIDE mmol/L 100 101 104   CO2 mmol/L 25 23 24   BUN mg/dL 33* 31* 32*   CREATININE mg/dL 1 74* 1 71* 1 53*   EGFR ml/min/1 73sq m 27 28 32   CALCIUM mg/dL 8 9 8 8 8 5     Results from last 7 days   Lab Units 10/09/22  1418 10/09/22  1417   BLOOD CULTURE  No Growth at 24 hrs  No Growth at 24 hrs

## 2022-10-11 NOTE — ASSESSMENT & PLAN NOTE
Nephrology following   Hyponatremia the setting of poor solute intake with gastric bypass, continue with salt tablets 2 g t i d , minimize free water flushes with tube feeds  · Follow daily labs    ·   Last serum sodium 130 yesterday

## 2022-10-11 NOTE — ASSESSMENT & PLAN NOTE
· Nephrology following - giving calcium gluconate, D50/insulin, sodium bicarb given October 5th  · Low-potassium pleasure feed diet  · Change tube feeds to Nephro  ·  Nutrition Services for goal rate

## 2022-10-11 NOTE — ASSESSMENT & PLAN NOTE
Today on 10/11/22 afternoon and rapid response was called for fall  -as per RR note and per patient, she was leaning forward , getting gou tof the bed  to grab a pillow, her leg got caught  and she fell  -denies head strike, loss of consciousness, pain, any lightheadaness, diaphoresis before or after fall  -patient moving all extremities after  Fall  - vital signs stable  -patient was placed on soft C-collar and CT head as well as cervical spine was obtained stat-result showed no fracture or subluxation  -fall precaution  -d/w brother Liberty Carrington and updated her status/incident today-   Also discussed regardign [address]

## 2022-10-11 NOTE — ASSESSMENT & PLAN NOTE
· Renal function appears to have stabilized around creatinine of 1 5-1 7  · Creatinine up-trending today -  · Possibly new baseline per Nephrology  · Monitor BMP

## 2022-10-11 NOTE — ASSESSMENT & PLAN NOTE
· Initially met criteria with fever, tachypnea, tachycardia, and leukocytosis on admission  Sepsis secondary to recurrent bacteremia and PICC line infections  · PICC line has been removed and patient had PEG tube placed for nutritional support  · Finished full course of ertapenem for ESBL UTI  · Patient with recurrent fever 10/7/22-  · Rule out recurrent bloodstream infection  Started empiric vancomycin for now  ·   Discussed with nursing  Blood cultures were not drawn yet on the 7th, finally sent on 9th    · Discussed with infectious disease    Will observe off of antibiotic therapy follow-up on results of cultures- sent on 10/9/22- NGTD

## 2022-10-11 NOTE — OCCUPATIONAL THERAPY NOTE
Occupational Therapy Progress Note     Patient Name: Agapito Pennington  KPJFH'L Date: 10/11/2022  Problem List  Principal Problem:    Sepsis ([facility] )  Active Problems:    Hypothyroidism    Hyponatremia    Abnormal CT scan    H/O bariatric surgery - bypass    NOAH (acute kidney injury) ([facility] )    Ambulatory dysfunction    Diarrhea    Moderate protein-calorie malnutrition ([facility] )    Bacteremia    Acute on chronic anemia    Goals of care, counseling/discussion    Acute encephalopathy    Hypotension    Urinary retention    Hyperkalemia        10/11/22 1124   OT Last Visit   OT Visit Date 10/11/22   Note Type   Note Type Treatment   Pain Assessment   Pain Assessment Tool 0-10   Pain Score 10 - Worst Possible Pain   Pain Location/Orientation Location: Generalized   Hospital Pain Intervention(s) Repositioned; Ambulation/increased activity; Emotional support   Restrictions/Precautions   Weight Bearing Precautions Per Order No   Other Precautions Cognitive;Contact/isolation; Fall Risk;Pain   ADL   Eating Assistance 5  Supervision/Setup   Eating Deficit Setup   UB Dressing Assistance 4  Minimal Assistance   UB Dressing Deficit Setup; Increased time to complete;Supervision/safety;Pull around back; Fasteners   LB Dressing Assistance 3  Moderate Assistance   LB Dressing Deficit Don/doff L sock; Setup;Supervision/safety; Increased time to complete   Bed Mobility   Supine to Sit 4  Minimal assistance   Additional items Assist x 1; Increased time required;Verbal cues;LE management   Sit to Supine 4  Minimal assistance   Additional items Assist x 1; Increased time required;Verbal cues;LE management   Transfers   Sit to Stand 3  Moderate assistance   Additional items Assist x 1; Increased time required;Verbal cues   Stand to Sit 3  Moderate assistance   Additional items Assist x 1; Increased time required;Verbal cues   Functional Mobility   Functional Mobility 2  Maximal assistance   Additional Comments MOD-MAX A FOR FUNCTIONAL MOBILITY WITHIN PT'S ROOM WITH USE OF RW   Additional items Rolling walker   Cognition   Overall Cognitive Status Impaired   Arousal/Participation Responsive   Attention Attends with cues to redirect   Orientation Level Oriented to person;Oriented to place   Memory Decreased recall of precautions;Decreased recall of recent events;Decreased short term memory   Following Commands Follows one step commands without difficulty   Activity Tolerance   Activity Tolerance Patient limited by fatigue;Treatment limited secondary to agitation   Medical Staff Made Aware PT REMAINS APPROPRIATE FOR CO-SESSION WITH SKILLED PHYSICAL THERAPIST 2' CLINICALLY UNSTABLE PRESENTATION, LIMITED PARTICIPATION, COG/AGITATION,  NEW PRECAUTIONS/LIMITATIONS, LIMITED ACTIVITY TOLERANCE AND PRESENT IMPAIRMENTS WHICH ARE A REGRESSION FROM THE PT'S BASELINE AND IMPACTING OVERALL OCCUPATIONAL PERFORMANCE  Assessment   Assessment PT SEEN FOR OT TX SESSION WITH FOCUS ON FUNCTIONAL TXFS/MOBILITY, UB/LB DRESSING, FEEDING AND PT EDUCATION  PT INITIALLY VERY IRRITABLE AND AGITATED, REFUSING PARTICIPATION HOWEVER WITH TIME AND THERAPEUTIC USE OF SELF, PT EVENTUALLY AGREEABLE THEN APPRECIATIVE OF THERAPIST  IMPROVEMENT NOTED IN BED MOBILITY TO MIN A  WHILE SEATED EOB, PT COMPLETED UB/LB DRESSING REQUIRING MIN A FOR COMPLETION OF [facility] GOWN AND MOD A FOR DONNING SOCKS INCLUDING ASSIST FOR L SOCK  PT CONTS TO REQUIRE MOD A FOR SIT <->STAND TXFS  IMPROVEMENT NOTED IN FUNCTIONAL MOBILITY WITHIN PT'S ROOM TO MOD-MAX A X1 WITH USE OF RW  PT RETURNED TO [place] AND REQUIRED SET-UP WITH ASSIST TO OPEN CONTAINERS FOR LUNCH  PT IS PROGRESSING HOWEVER REMAINS SIGNIFICANTLY LIMITED  CONT TO RECOMMEND INPT REHAB UPON D/C  WILL CONT TO FOLLOW  Plan   Treatment Interventions ADL retraining;Functional transfer training; Endurance training;Cognitive reorientation;Patient/family training;Equipment evaluation/education; Compensatory technique education; Energy conservation; Activityengagement   Goal Expiration Date 10/17/22   OT Treatment Day 6   OT Frequency 2-3x/wk   Recommendation   OT Discharge Recommendation Post acute rehabilitation services   AM-PAC Daily Activity Inpatient   Lower Body Dressing 2   Bathing 2   Toileting 2   Upper Body Dressing 3   Grooming 3   Eating 3   Daily Activity Raw Score 15   Daily Activity Standardized Score (Calc for Raw Score >=11) 34 69   AM-PAC Applied Cognition Inpatient   Following a Speech/Presentation 2   Understanding Ordinary Conversation 4   Taking Medications 2   Remembering Where Things Are Placed or Put Away 2   Remembering List of 4-5 Errands 2   Taking Care of Complicated Tasks 2   Applied Cognition Raw Score 14   Applied Cognition Standardized Score 32 02   Modified [place] Scale   Modified Joey Scale 4       Documentation completed by [facility], CARLOS, OTR/L  [facility] OF THE [facility] Certified ID# [id number]

## 2022-10-11 NOTE — ASSESSMENT & PLAN NOTE
Esthela Olivo RN [facility]osp[facility] Coordinator [phone]. Received TC referral for [facility] eval by Jennifer FELIPE. Met with pts son in law Paulino  daughter Ratna and several other family members. Reviewed hospice philosophy, care provided at [facility], and insurance coverage (including room and board). Pt/family accepting of transfer to [facility] Friday 11/10 time to be determined.  MSNASIM Rodriguez updated and will schedule transport. Writer will contact Staff RN 11/10 to update and  call RN to RN report at [phone]. Service to Hospice order needs correction. State DNR bracelet to be in place prior to transport. POA activated. Will sign off on discharge. Thank you for the referral.      · Continue midodrine 10 mg t i d

## 2022-10-11 NOTE — RAPID RESPONSE
Rapid Response Note  Shelia Leo 76 y o  female MRN: [id number]  Unit/Bed#: [facility] 323-01 Encounter: [id number]    Rapid Response Notification(s):   Response called date/time:  10/11/2022 4:08 PM  Response team arrival date/time:  10/11/2022 4:12 PM  Response end date/time:  10/11/2022 4:22 PM  [facility]  Rapid response location:  [facility] unit  Primary reason for rapid response call: Fall    Rapid Response Intervention(s):   Airway:  None  Breathing:  None  Circulation:  None  Fluids administered:  None  Medications administered:  None       Assessment:   · FAll    Plan:   · Apply C-collar  · STAT CT head and C-spine       Rapid Response Outcome:   Transfer:  Remain on floor  Code Status: Level 3 (DNAR and DNI)      Family notified of transfer: yes       Background/Situation:   Shelia Leo is a 76 y o  female who presented for diarrhea  Code Foxtrot called on 10/11  Patient states she was getting up out of bed to get a pillow  She states her foot got caught and she fell  She denies prodromal symptoms  Denies head strike  Denies pain  Denies headache, neck pain  Review of Systems   Unable to perform ROS: Other (documented above)       Objective:   Vitals:    10/11/22 0700 10/11/22 1014 10/11/22 1551 10/11/22 1620   BP: 104/52  111/51 121/59   BP Location: Left arm  Right arm    Pulse: 83  83 98   Resp: 18  16    Temp: 98 6 °F (37 °C)  98 2 °F (36 8 °C)    TempSrc: Oral  Oral    SpO2: 95% 97% 97%    Weight:       Height:         Physical Exam  Constitutional:       Appearance: She is not ill-appearing  HENT:      Head: Normocephalic and atraumatic  Right Ear: External ear normal       Left Ear: External ear normal       Mouth/Throat:      Mouth: Mucous membranes are moist       Pharynx: Oropharynx is clear  Eyes:      General: No scleral icterus  Conjunctiva/sclera: Conjunctivae normal    Cardiovascular:      Rate and Rhythm: Normal rate and regular rhythm        Pulses: Normal pulses  Pulmonary:      Effort: Pulmonary effort is normal  No respiratory distress  Breath sounds: No wheezing  Musculoskeletal:      Cervical back: No tenderness  Neurological:      Mental Status: She is alert  Portions of the record may have been created with voice recognition software  Occasional wrong word or "sound a like" substitutions may have occurred due to the inherent limitations of voice recognition software  Read the chart carefully and recognize, using context, where substitutions have occurred      Yohannes Hernandez DO

## 2022-10-11 NOTE — PHYSICAL THERAPY NOTE
Physical Therapy Re-Evaluation    Patient's Name: Lenwood Fabry    Admitting Diagnosis  Diarrhea of presumed infectious origin [R19 7]  Fatigue [R53 83]  C  difficile diarrhea [A04 72]  Sepsis ([facility] ) [A41 9]    Problem List  Patient Active Problem List   Diagnosis    Hypothyroidism    Gastroesophageal reflux disease without esophagitis    Depression    Fibromyalgia, primary    Iron deficiency    Numbness of foot    Dysphasia    Epigastric pain    COVID-19    Acute bronchitis    Shortness of breath    Medicare annual wellness visit, subsequent    Fibromyalgia syndrome    Osteopenia of both forearms    Cerumen debris on tympanic membrane of right ear    Nasal congestion    Bilateral hearing loss    Anemia    Dyspnea on exertion    Generalized weakness    Elevated LFTs    Hyponatremia    Abnormal CT scan    H/O bariatric surgery - bypass    Gastric ulcer    Acute blood loss anemia    NOAH (acute kidney injury) ([facility] )    Ambulatory dysfunction    Severe protein-calorie malnutrition ([facility] )    Bilateral leg edema    Gram-positive bacteremia    Sepsis ([facility] )    Sacral ulcer ([facility] )    Diarrhea    Fall    Colitis presumed to be due to infection    Acute cystitis without hematuria    Acute kidney insufficiency    Moderate protein-calorie malnutrition (HCC)    Bacteremia    Acute on chronic anemia    Goals of care, counseling/discussion    Acute encephalopathy    Hypotension    Urinary retention    Hyperkalemia       Past Medical History  Past Medical History:   Diagnosis Date    Anemia     Anxiety     Bipolar disorder ([facility] )     Colon polyp     Disease of thyroid gland     Essential hypertension     Essential tremor     Fibromyalgia, primary     GERD (gastroesophageal reflux disease)     Inflammatory polyarthropathy (HCC)     Mammogram abnormal     Pressure injury of skin        Past Surgical History  Past Surgical History:   Procedure Laterality Date    BREAST BIOPSY Right 2015    benign    CARPAL TUNNEL RELEASE Bilateral COLONOSCOPY      EGD AND COLONOSCOPY  01/01/2014    GASTRIC BYPASS  07/01/2012    MAMMO NEEDLE LOCALIZATION RIGHT (ALL INC) Right 2/6/2012    MAMMO NEEDLE LOCALIZATION RIGHT (ALL INC) Right 2/6/2012    ULNAR NERVE TRANSPOSITION Right         10/11/22 1123   PT Last Visit   PT Visit Date 10/11/22   Note Type   Note type Re-Evaluation   Pain Assessment   Pain Assessment Tool 0-10   Pain Score 10 - Worst Possible Pain   Pain Location/Orientation Location: Generalized   Hospital Pain Intervention(s) Repositioned; Ambulation/increased activity; Emotional support   Restrictions/Precautions   Weight Bearing Precautions Per Order No   Braces or Orthoses   (b/l multipodus boots)   Other Precautions Contact/isolation;Cognitive; Chair Alarm; Bed Alarm; Fall Risk   Home Living   Type of Home SNF   Home Equipment Walker   Additional Comments Pt was admitted from [address] [place] [address], where she was receiving rehab (multiple readmissions from rehab since May/Georgette of 2022)  Per IE pt was ambulating with RW and Ax1 at rehab  Normally, pt resides in a [facility] and is I with ADLs and functional mobility  Prior Function   Level of [place] Needs assistance with IADLS; Independent with IADLS;Needs assistance with functional mobility   Lives With Alone; Facility staff  (normally alone, admitted from rehab)   Urvashi Alvares Help From Other (Comment)  (facility staff)   Cognition   Overall Cognitive Status Impaired   Attention Attends with cues to redirect   Orientation Level Oriented to person;Oriented to place   Memory Decreased recall of precautions   Following Commands Follows one step commands without difficulty   Comments Pt initially very hesistant to work with therapists, calling them "tricksters" and yelling at them to leave the room  However, pt then became agreeable and was happy to work with PT/OT     RLE Assessment   RLE Assessment   (~3-/5, generalized weakness)   LLE Assessment   LLE Assessment   (~3-/5, generalized weakness)   Bed Mobility Supine to Sit 4  Minimal assistance   Additional items Assist x 1;HOB elevated; Bedrails; Increased time required   Sit to Supine 4  Minimal assistance   Additional items Assist x 1; Increased time required;Verbal cues   Additional Comments Pt requiring Paulo/close supervision for trunk controal at EOB   Transfers   Sit to Stand 3  Moderate assistance   Additional items Assist x 1; Increased time required;Verbal cues   Stand to Sit 3  Moderate assistance   Additional items Assist x 1; Increased time required;Verbal cues   Additional Comments transfers with RW, VCs for proper hand placement and safety   Ambulation/Elevation   Gait pattern Excessively slow; Short stride;Narrow BRANDAN; Decreased foot clearance;Shuffling;R Knee Yordan;L Knee Yordan   Gait Assistance 2  Maximal assist   Additional items Assist x 1;Verbal cues   Assistive Device Rolling walker   Distance 8ft with pt initially requiring modA, but then progressing to maxA with fatigue and b/l knee buckling   Ambulation/Elevation Additional Comments VCs for improved hip and knee extension   Balance   Static Sitting Fair -   Dynamic Sitting Poor +   Static Standing Poor   Dynamic Standing Poor -   Ambulatory Poor -   Endurance Deficit   Endurance Deficit Yes   Endurance Deficit Description weakness, fatigue, deconditioning   Activity Tolerance   Activity Tolerance Patient limited by fatigue   Medical Staff Made Aware Seen with OT 2* pt's medical complexity, multiple comorbidities, and limited activity tolerance  PT focus on functional transfers, LE strength, and gait   Nurse Made Aware ok to see per RN   Assessment   Prognosis Fair   Problem List Decreased strength;Decreased endurance; Impaired balance;Decreased mobility; Decreased cognition;Decreased safety awareness;Pain   Assessment Pt seen for PT re-evaluation s/p goal expiration  Pt was initially admitted to [facility] on 8/30[facility]2022 with a primary dx of: sepsis   Prior to admission, pt was receiving rehab at [address], where she was ambulating with RW and Ax1  Pt with multiple readmissions to the hospital from rehab in the last few months; normally pt lives in a house and is independent with ADLs and functional mobility  At time of initial evaluation, pt was requiring Paulo for bed mobility; Paulo for transfers; and Paulo for ambulation 3 ft with RW  Pt has made slow progress towards mobility goals, limited participation with therapy since initial eval  Upon re-evaluation, pt currently is requiring Paulo for bed mobility; modA for transfers and mod-maxA for ambulation 8 ft w/ RW  Pt presents at PT re-eval functioning below baseline and currently w/ overall mobility deficits 2* to: BLE weakness, impaired balance, decreased endurance, gait deviations, pain, decreased activity tolerance compared to baseline, decreased functional mobility tolerance compared to baseline, decreased safety awareness, fall risk, decreased cognition  Pt currently at a fall risk 2* to impairments listed above  Pt will continue to benefit from skilled acute PT interventions to address stated impairments; to maximize functional mobility; for ongoing pt/ family training; and DME needs  At conclusion of PT session pt returned BTB with phone and call bell within reach  Pt denies any further questions at this time  The patient's AM-PAC Basic Mobility Inpatient Short Form Raw Score is 12  A Raw score of less than or equal to 16 suggests the patient may benefit from discharge to post-acute rehabilitation services  Please also refer to the recommendation of the Physical Therapist for safe discharge planning  Recommend rehab upon hospital D/C  Goals   Patient Goals to get stronger   STG Expiration Date 10/25/22   Short Term Goal #1 In 10-14 days pt will be able to: 1  Demonstrate ability to perform all aspects of bed mobility independently to increase functional independence    2  Perform functional transfers at mod (I) level to facilitate safe return to previous living environment  3   Ambulate at least 200 ft with RW with Paulo with stable vitals to improve safety with household distances and reduce fall risk  4  Climb 12 steps with HR and Paulo to simulate entrance to home  5  Improve LE strength grades by 1 to increase ease of functional mobility with transfers and gait  6  Pt will demonstrate improved balance by one grade order to decrease risk of falls  PT Treatment Day 0   Plan   Treatment/Interventions Functional transfer training;LE strengthening/ROM; Elevations; Therapeutic exercise; Endurance training;Equipment eval/education; Bed mobility;Gait training;Spoke to nursing;Spoke to case management;OT   PT Frequency 2-3x/wk   Recommendation   PT Discharge Recommendation Post acute rehabilitation services   AM-PAC Basic Mobility Inpatient   Turning in Bed Without Bedrails 3   Lying on Back to Sitting on Edge of Flat Bed 3   Moving Bed to Chair 2   Standing Up From Chair 2   Walk in Room 1   Climb 3-5 Stairs 1   Basic Mobility Inpatient Raw Score 12   Basic Mobility Standardized Score 32 23   Highest Level Of Mobility   JH-HLM Goal 4: Move to chair/commode   JH-HLM Achieved 5: Stand (1 or more minutes)   Modified Joey Scale   Modified Joey Scale 4   End of Consult   Patient Position at End of Consult Supine; All needs within reach       Tam Menard, PT, DPT

## 2022-10-12 PROBLEM — Z00.00 MEDICARE ANNUAL WELLNESS VISIT, SUBSEQUENT: Status: RESOLVED | Noted: 2021-09-28 | Resolved: 2022-10-12

## 2022-10-12 PROBLEM — H61.21 CERUMEN DEBRIS ON TYMPANIC MEMBRANE OF RIGHT EAR: Status: RESOLVED | Noted: 2022-02-08 | Resolved: 2022-10-12

## 2022-10-12 NOTE — PLAN OF CARE
Problem: Potential for Falls  Goal: Patient will remain free of falls  Description: INTERVENTIONS:  - Educate patient/family on patient safety including physical limitations  - Instruct patient to call for assistance with activity   - Consult OT/PT to assist with strengthening/mobility   - Keep Call bell within reach  - Keep bed low and locked with side rails adjusted as appropriate  - Keep care items and personal belongings within reach  - Initiate and maintain comfort rounds  - Make Fall Risk Sign visible to staff  - Offer Toileting every  Hours, in advance of need  - Initiate/Maintain alarm  - Obtain necessary fall risk management equipment:   - Apply yellow socks and bracelet for high fall risk patients  - Consider moving patient to room near nurses station  Outcome: Not Progressing     Problem: MOBILITY - ADULT  Goal: Maintain or return to baseline ADL function  Description: INTERVENTIONS:  -  Assess patient's ability to carry out ADLs; assess patient's baseline for ADL function and identify physical deficits which impact ability to perform ADLs (bathing, care of mouth/teeth, toileting, grooming, dressing, etc )  - Assess/evaluate cause of self-care deficits   - Assess range of motion  - Assess patient's mobility; develop plan if impaired  - Assess patient's need for assistive devices and provide as appropriate  - Encourage maximum independence but intervene and supervise when necessary  - Involve family in performance of ADLs  - Assess for home care needs following discharge   - Consider OT consult to assist with ADL evaluation and planning for discharge  - Provide patient education as appropriate  Outcome: Not Progressing  Goal: Maintains/Returns to pre admission functional level  Description: INTERVENTIONS:  - Perform BMAT or MOVE assessment daily    - Set and communicate daily mobility goal to care team and patient/family/caregiver     - Collaborate with rehabilitation services on mobility goals if consulted  - Perform Range of Motion  times a day  - Reposition patient every  hours    - Dangle patient  times a day  - Stand patient  times a day  - Ambulate patient  times a day  - Out of bed to chair  times a day   - Out of bed for meals  times a day  - Out of bed for toileting  - Record patient progress and toleration of activity level   Outcome: Not Progressing     Problem: INFECTION - ADULT  Goal: Absence or prevention of progression during hospitalization  Description: INTERVENTIONS:  - Assess and monitor for signs and symptoms of infection  - Monitor lab/diagnostic results  - Monitor all insertion sites, i e  indwelling lines, tubes, and drains  - Monitor endotracheal if appropriate and nasal secretions for changes in amount and color  - Miles appropriate cooling/warming therapies per order  - Administer medications as ordered  - Instruct and encourage patient and family to use good hand hygiene technique  - Identify and instruct in appropriate isolation precautions for identified infection/condition  Outcome: Not Progressing  Goal: Absence of fever/infection during neutropenic period  Description: INTERVENTIONS:  - Monitor WBC    Outcome: Not Progressing     Problem: SAFETY ADULT  Goal: Patient will remain free of falls  Description: INTERVENTIONS:  - Educate patient/family on patient safety including physical limitations  - Instruct patient to call for assistance with activity   - Consult OT/PT to assist with strengthening/mobility   - Keep Call bell within reach  - Keep bed low and locked with side rails adjusted as appropriate  - Keep care items and personal belongings within reach  - Initiate and maintain comfort rounds  - Make Fall Risk Sign visible to staff  - Offer Toileting every  Hours, in advance of need  - Initiate/Maintain alarm  - Obtain necessary fall risk management equipment:   - Apply yellow socks and bracelet for high fall risk patients  - Consider moving patient to room near nurses station  Outcome: Not Progressing  Goal: Maintain or return to baseline ADL function  Description: INTERVENTIONS:  -  Assess patient's ability to carry out ADLs; assess patient's baseline for ADL function and identify physical deficits which impact ability to perform ADLs (bathing, care of mouth/teeth, toileting, grooming, dressing, etc )  - Assess/evaluate cause of self-care deficits   - Assess range of motion  - Assess patient's mobility; develop plan if impaired  - Assess patient's need for assistive devices and provide as appropriate  - Encourage maximum independence but intervene and supervise when necessary  - Involve family in performance of ADLs  - Assess for home care needs following discharge   - Consider OT consult to assist with ADL evaluation and planning for discharge  - Provide patient education as appropriate  Outcome: Not Progressing  Goal: Maintains/Returns to pre admission functional level  Description: INTERVENTIONS:  - Perform BMAT or MOVE assessment daily    - Set and communicate daily mobility goal to care team and patient/family/caregiver  - Collaborate with rehabilitation services on mobility goals if consulted  - Perform Range of Motion  times a day  - Reposition patient every  hours    - Dangle patient  times a day  - Stand patient  times a day  - Ambulate patient  times a day  - Out of bed to chair  times a day   - Out of bed for meals  times a day  - Out of bed for toileting  - Record patient progress and toleration of activity level   Outcome: Not Progressing     Problem: DISCHARGE PLANNING  Goal: Discharge to home or other facility with appropriate resources  Description: INTERVENTIONS:  - Identify barriers to discharge w/patient and caregiver  - Arrange for needed discharge resources and transportation as appropriate  - Identify discharge learning needs (meds, wound care, etc )  - Arrange for interpretive services to assist at discharge as needed  - Refer to Case Management Department for coordinating discharge planning if the patient needs post-hospital services based on physician/advanced practitioner order or complex needs related to functional status, cognitive ability, or social support system  Outcome: Not Progressing     Problem: Knowledge Deficit  Goal: Patient/family/caregiver demonstrates understanding of disease process, treatment plan, medications, and discharge instructions  Description: Complete learning assessment and assess knowledge base    Interventions:  - Provide teaching at level of understanding  - Provide teaching via preferred learning methods  Outcome: Not Progressing     Problem: SKIN/TISSUE INTEGRITY - ADULT  Goal: Skin Integrity remains intact(Skin Breakdown Prevention)  Description: Assess:  -Perform Gabe assessment every   -Clean and moisturize skin every   -Inspect skin when repositioning, toileting, and assisting with ADLS  -Assess under medical devices such as  every   -Assess extremities for adequate circulation and sensation     Bed Management:  -Have minimal linens on bed & keep smooth, unwrinkled  -Change linens as needed when moist or perspiring  -Avoid sitting or lying in one position for more than  hours while in bed  -Keep HOB at degrees     Toileting:  -Offer bedside commode  -Assess for incontinence every   -Use incontinent care products after each incontinent episode such as     Activity:  -Mobilize patient  times a day  -Encourage activity and walks on unit  -Encourage or provide ROM exercises   -Turn and reposition patient every  Hours  -Use appropriate equipment to lift or move patient in bed  -Instruct/ Assist with weight shifting every  when out of bed in chair  -Consider limitation of chair time  hour intervals    Skin Care:  -Avoid use of baby powder, tape, friction and shearing, hot water or constrictive clothing  -Relieve pressure over bony prominences using   -Do not massage red bony areas    Next Steps:  -Teach patient strategies to minimize risks such as    -Consider consults to  interdisciplinary teams such as   Outcome: Not Progressing  Goal: Incision(s), wounds(s) or drain site(s) healing without S/S of infection  Description: INTERVENTIONS  - Assess and document dressing, incision, wound bed, drain sites and surrounding tissue  - Provide patient and family education  - Perform skin care/dressing changes every   Outcome: Not Progressing  Goal: Pressure injury heals and does not worsen  Description: Interventions:  - Implement low air loss mattress or specialty surface (Criteria met)  - Apply silicone foam dressing  - Instruct/assist with weight shifting every  minutes when in chair   - Limit chair time to  hour intervals  - Use special pressure reducing interventions such as  when in chair   - Apply fecal or urinary incontinence containment device   - Perform passive or active ROM every   - Turn and reposition patient & offload bony prominences every  hours   - Utilize friction reducing device or surface for transfers   - Consider consults to  interdisciplinary teams such as   - Use incontinent care products after each incontinent episode such as   - Consider nutrition services referral as needed  Outcome: Not Progressing     Problem: Prexisting or High Potential for Compromised Skin Integrity  Goal: Skin integrity is maintained or improved  Description: INTERVENTIONS:  - Identify patients at risk for skin breakdown  - Assess and monitor skin integrity  - Assess and monitor nutrition and hydration status  - Monitor labs   - Assess for incontinence   - Turn and reposition patient  - Assist with mobility/ambulation  - Relieve pressure over bony prominences  - Avoid friction and shearing  - Provide appropriate hygiene as needed including keeping skin clean and dry  - Evaluate need for skin moisturizer/barrier cream  - Collaborate with interdisciplinary team   - Patient/family teaching  - Consider wound care consult   Outcome: Not Progressing     Problem: Nutrition/Hydration-ADULT  Goal: Nutrient/Hydration intake appropriate for improving, restoring or maintaining nutritional needs  Description: Monitor and assess patient's nutrition/hydration status for malnutrition  Collaborate with interdisciplinary team and initiate plan and interventions as ordered  Monitor patient's weight and dietary intake as ordered or per policy  Utilize nutrition screening tool and intervene as necessary  Determine patient's food preferences and provide high-protein, high-caloric foods as appropriate       INTERVENTIONS:  - Monitor oral intake, urinary output, labs, and treatment plans  - Assess nutrition and hydration status and recommend course of action  - Evaluate amount of meals eaten  - Assist patient with eating if necessary   - Allow adequate time for meals  - Recommend/ encourage appropriate diets, oral nutritional supplements, and vitamin/mineral supplements  - Order, calculate, and assess calorie counts as needed  - Recommend, monitor, and adjust tube feedings and TPN/PPN based on assessed needs  - Assess need for intravenous fluids  - Provide specific nutrition/hydration education as appropriate  - Include patient/family/caregiver in decisions related to nutrition  Outcome: Not Progressing

## 2022-10-12 NOTE — ASSESSMENT & PLAN NOTE
· Evaluated by palliative care  Wants to continue medical directed treatments with limits of DNR/DNI  · Spoke with patient's brother Sobia Navarrete today who is power of  ( but financial power of ), We discussed goals of care  BrotherSobia agrees that we need to address palliative/comfort option again given her failure to thrive  He will discuss with rest of the siblings and they all will discussed with patient  In next few days he will update us wether they  would like to have a meeting with palliative/hospice again

## 2022-10-12 NOTE — UTILIZATION REVIEW
Continued Stay Review    Date: 10/12/22                        Current Patient Class: inpatient  Current Level of Care: med/surg  HPI:77 y o  female initially admitted on 8/30 with sepsis, UTI  Assessment/Plan: pt continues to c/o L-sided abdominal pain  Last creatinine 1 74 on 10/10, suspect possible NOAH vs new baseline per nephrology  BMP daily  Pt had a rapid response called yesterday after he had a fall  CTH, CT c-spine with no acute findings  Discussion held with pt brother who is POA, and goals of care discussed re  palliative/comfort option again given her failure to thrive  He would like to discuss with the rest of his siblings and they plan to discuss with pt and update on decision in next few days  Continue supportive care       Vital Signs:   Date/Time Temp Pulse Resp BP MAP (mmHg) SpO2 O2 Device   10/12/22 1541 97 6 °F (36 4 °C) 77 15 107/56 78 96 % None (Room air)   10/12/22 1100 -- -- -- -- -- 97 % None (Room air)   10/12/22 0928 98 5 °F (36 9 °C) 73 16 86/48 Abnormal  64 Abnormal  96 % None (Room air)   10/11/22 2325 98 3 °F (36 8 °C) 72 19 96/50 64 Abnormal  95 % None (Room air)   10/11/22 2227 98 °F (36 7 °C) 78 15 84/39 Abnormal  50 Abnormal  92 % None (Room air)   10/11/22 16:20:02 -- 98 -- 121/59 -- -- --   10/11/22 1551 98 2 °F (36 8 °C) 83 16 111/51 74 97 % None (Room air)   10/11/22 1014 -- -- -- -- -- 97 % None (Room air)   10/11/22 0700 98 6 °F (37 °C) 83 18 104/52 74 95 % --   10/11/22 0250 -- -- -- -- -- -- None (Room air)   10/10/22 2300 97 8 °F (36 6 °C) 75 -- 95/49 Abnormal  62 Abnormal  92 % None (Room air)   10/10/22 1531 98 7 °F (37 1 °C) 86 20 99/59 73 92 % None (Room air)   10/10/22 0843 98 4 °F (36 9 °C) 88 20 107/50 -- 93 % None (Room air)     Pertinent Labs/Diagnostic Results:     Results from last 7 days   Lab Units 10/12/22  1220 10/10/22  0500 10/09/22  1416 10/07/22  0554 10/06/22  2241 10/06/22  2241   WBC Thousand/uL 10 17* 14 78* 13 79* 14 20*  --  16 57*   HEMOGLOBIN g/dL 7 4* 8 2* 8 3* 7 3*  --  7 4*   HEMATOCRIT % 23 2* 26 3* 26 6* 23 9*  --  23 9*   PLATELETS Thousands/uL 504* 372 652* 539*  --  558*   NEUTROS ABS Thousands/µL 6 39 10 09* 9 10* 9 18*   < >  --     < > = values in this interval not displayed  Results from last 7 days   Lab Units 10/12/22  1220 10/10/22  0500 10/09/22  1416 10/08/22  0500 10/07/22  0554   SODIUM mmol/L 133* 130* 131* 133* 134*   POTASSIUM mmol/L 4 2 5 0 5 5* 5 6* 5 6*   CHLORIDE mmol/L 103 100 101 104 104   CO2 mmol/L 23 25 23 24 24   ANION GAP mmol/L 7 5 7 5 6   BUN mg/dL 35* 33* 31* 32* 30*   CREATININE mg/dL 1 74* 1 74* 1 71* 1 53* 1 48*   EGFR ml/min/1 73sq m 27 27 28 32 33   CALCIUM mg/dL 9 1 8 9 8 8 8 5 8 4     Results from last 7 days   Lab Units 10/12/22  0558 10/11/22  2351 10/11/22  1610 10/11/22  1239 10/11/22  0640 10/11/22  0046 10/10/22  1822 10/10/22  1202 10/10/22  0656 10/09/22  1603 10/09/22  1141 10/09/22  0626   POC GLUCOSE mg/dl 91 95 98 117 105 94 93 96 106 99 104 111     Results from last 7 days   Lab Units 10/12/22  1220 10/10/22  0500 10/09/22  1416 10/08/22  0500 10/07/22  0554 10/06/22  1128 10/05/22  2242   GLUCOSE RANDOM mg/dL 89 106 93 120 127 127 103     Results from last 7 days   Lab Units 10/10/22  0500   TSH 3RD GENERATON uIU/mL 67 200*       Results from last 7 days   Lab Units 10/09/22  1418 10/09/22  1417   BLOOD CULTURE  No Growth at 48 hrs  No Growth at 48 hrs         Medications:   Scheduled Medications:  alteplase, 2 mg, Intracatheter, Once  bisacodyl, 10 mg, Rectal, Once  clonazePAM, 3 mg, Oral, HS  folic acid, 1 mg, Oral, Daily  guaiFENesin, 600 mg, Oral, Q12H RHONA  heparin (porcine), 5,000 Units, Subcutaneous, Q8H RHONA  levothyroxine, 125 mcg, Oral, Q24H  midodrine, 10 mg, Oral, TID AC  oxyCODONE, 5 mg, Oral, Q6H  pantoprazole, 40 mg, Oral, BID AC  polyethylene glycol, 17 g, Per PEG Tube, BID  senna, 2 tablet, Oral, HS  sodium bicarbonate, 650 mg, Oral, BID after meals  sodium chloride, 2 g, Oral, TID  thiamine, 100 mg, Oral, Daily  traZODone, 150 mg, Oral, HS    PRN Meds:  acetaminophen, 975 mg, Oral, Q6H PRN  ALPRAZolam, 0 25 mg, Oral, TID PRN  bisacodyl, 10 mg, Rectal, Daily PRN  metoclopramide, 10 mg, Intravenous, Q6H PRN  ondansetron, 4 mg, Intravenous, Q6H PRN        Discharge Plan: D    Network Utilization Review Department  ATTENTION: Please call with any questions or concerns to [phone] and carefully listen to the prompts so that you are directed to the right person  All voicemails are confidential   Jose Mcneal all requests for admission clinical reviews, approved or denied determinations and any other requests to dedicated fax number below belonging to the campus where the patient is receiving treatment   List of dedicated fax numbers for the Facilities:  [address] DENIALS (Administrative/Medical Necessity) [phone]   [address] (Maternity/NICU/Pediatrics) [phone]   0 [address]ess[address] Liriano [phone]   [facility] [address] [phone]   1306 [facility] 150 [facility] [place] [address] [address] [address] [phone]   [address] [facility] [address] [phone]

## 2022-10-12 NOTE — PROGRESS NOTES
[facility] [facility]  Progress Note - Magi Rockwell 1945, 76 y o  female MRN: [id number]  Unit/Bed#: [facility] 323-01 Encounter: [id number]  Primary Care Provider: Carmen Villegas MD   Date and time admitted to hospital: 8/30/2022 11:49 PM    * Sepsis [facility])  Assessment & Plan  · Initially met criteria with fever, tachypnea, tachycardia, and leukocytosis on admission  Sepsis secondary to recurrent bacteremia and PICC line infections  · PICC line has been removed and patient had PEG tube placed for nutritional support  · Finished full course of ertapenem for ESBL UTI  · Patient with recurrent fever 10/7/22-  · Rule out recurrent bloodstream infection  Started empiric vancomycin for now  ·   Discussed with nursing  Blood cultures were not drawn yet on the 7th, finally sent on 9th    · Discussed with infectious disease  Will observe off of antibiotic therapy follow-up on results of cultures- sent on 10/9/22- NGTD    Bacteremia  Assessment & Plan  · Recent hx of staph epi bacteremia in 7/2022, presumed due to picc line  · Recurrence 1/2 set staph epi, 2nd set without growth  Was treated with intravenous vancomycin for presumed line infection  · S/p TAVIA (9/9) no evidence of vegetation  · Blood cultures had been negative however repeated again due to isolated fever which are negative to date  · PICC and TPN discontinued, now has PEG tube  · Monitor off abx    NOAH (acute kidney injury) ([facility] [address] )  Assessment & Plan  · Renal function appears to have stabilized around creatinine of 1 5-1 7  · Creatinine up-trending today -  · Possibly new baseline per Nephrology  · Monitor BMP    H/O bariatric surgery - bypass  Assessment & Plan  · History of Savage-en-Y gastric bypass  Has had significant issues over the last 6 months or so with chronic malnutrition and an anastomotic ulcerations    When last seen by bariatric surgery, it was recommended to continue on prolonged TPN for nutritional optimization with possible revision of the Savage-en-Y in the future  · Given her recurrence PICC line infections and bacteremia, TPN has been discontinued  · Continue tube feeds for nutritional optimization  · Will need significant improvement before consideration can be given to further surgical intervention      Hyponatremia  Assessment & Plan    Nephrology following   Hyponatremia the setting of poor solute intake with gastric bypass, continue with salt tablets 2 g t i d , minimize free water flushes with tube feeds  Na- 133        Hyperkalemia  Assessment & Plan    · Nephrology following - giving calcium gluconate, D50/insulin, sodium bicarb given October 5th  · Low-potassium pleasure feed diet  · Change tube feeds to Nephro  · k normal now    Urinary retention  Assessment & Plan  · Has required intermittent catheterizations however now voiding spontaneously  · Continue retention protocol    Hypotension  Assessment & Plan  · Continue midodrine 10 mg t i d     Acute encephalopathy  Assessment & Plan  · Patient has waxing waning mental status   · Cognition and mental status issues multifactorial in nature stemming from prolonged illness, numerous hospitalizations, chronic malnutrition, and chronic/recurrent infections  · Monitor clinically  · Delirium precautions  · Neuropsych consulted for capacity eval      Goals of care, counseling/discussion  Assessment & Plan  · Evaluated by palliative care  Wants to continue medical directed treatments with limits of DNR/DNI  · Spoke with patient's brother Dayna Masterson today who is power of  ( but financial power of ), We discussed goals of care  Brother, Dayna Masterson agrees that we need to address palliative/comfort option again given her failure to thrive  He will discuss with rest of the siblings and they all will discussed with patient  In next few days he will update us wether they  would like to have a meeting with palliative/hospice again        Acute on chronic anemia  Assessment & Plan  · 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  · S/p recent EGD in 7/22 revealing: Residual marginal ulcer of the gastrojejunostomy anastomosis with improved size  · S/p EGD 9/2 revealing: Large, cratered ulcer in the gastrojejunal anastomosis   · Status post 1 unit PRBCs this admission  · Continue PPI b i d      Moderate protein-calorie malnutrition ([address] )  Assessment & Plan  Malnutrition Findings:   Adult Malnutrition type: Chronic illness  Adult Degree of Malnutrition: Other severe protein calorie malnutrition  Malnutrition Characteristics: Weight loss, Muscle loss       360 Statement: Severe/Chronic malnutrition r/t condition, as evidenced by 4 8% wt loss x < 1 week (9/2/22: 125#, 9/5/22: 119#), and severe muscle mass depletion (temples/clavicle)  Treated with liberalized diet and nutrition supplements, possibly nutrition support pending goals of care    BMI Findings: Body mass index is 18 28 kg/m²  · Continue tube feeds    Fall  Assessment & Plan  Today on 10/11/22 afternoon and rapid response was called for fall  -as per RR note and per patient, she was leaning forward , getting gou tof the bed  to grab a pillow, her leg got caught  and she fell  -denies head strike, loss of consciousness, pain, any lightheadaness, diaphoresis before or after fall  -patient moving all extremities after  Fall  - vital signs stable  -patient was placed on soft C-collar and CT head as well as cervical spine was obtained stat-result showed no fracture or subluxation  -fall precaution  -d/w brother [place] [employer] and updated her status/incident of fall   Also discussed regardign GOC    Diarrhea  Assessment & Plan  · Recent history of C diff colitis  · Treated with prophylactic dose of p o   Vancomycin while on antibiotics     Ambulatory dysfunction  Assessment & Plan  · Will need rehab on discharge      Abnormal CT scan  Assessment & Plan  · Noted on CT C/A/P 9/26  - Focal thickening of the lower portion of the endometrium  While this is not expected in a patient of this age, this appears stable from 2016 suggesting a benign etiology  If there is clinical concern, follow-up ultrasound is recommended  - Small left upper lobe nodules, one of which is new from 2022  Given the history of smoking, a follow-up chest CT is recommended in 12 months  · This was d/w sister and brother POA over phone by prior provider     Hypothyroidism  Assessment & Plan  · TSH elevated and fT4 low  · Endocrinology consulted - levothyroxine timing was adjusted to 2pm per Endocrinology recs (4 hours after tube feeds have stopped running)  · Repeat TFTs in 1 week per Endocrine          VTE Pharmacologic Prophylaxis: VTE Score: 3 Moderate Risk (Score 3-4) - Pharmacological DVT Prophylaxis Ordered: heparin  Patient Centered Rounds: I performed bedside rounds with nursing staff today  Discussions with Specialists or Other Care Team Provider: CM    Education and Discussions with Family / Patient: Updated  (brother) via phone  yesterday    Time Spent for Care: 20 minutes  More than 50% of total time spent on counseling and coordination of care as described above  Current Length of Stay: 42 day(s)  Current Patient Status: Inpatient   Certification Statement: The patient will continue to require additional inpatient hospital stay due to medical stabilty and [address] discussion f/u  Discharge Plan: Anticipate discharge in >72 hrs to rehab facility  Code Status: Level 3 - DNAR and DNI    Subjective:   Her resting in bed, dietary in the room, she is aware that she had potassium restricted diet, denies any headache, neck pain, chest pain, nausea or vomiting      Objective:     Vitals:   Temp (24hrs), Av 1 °F (36 7 °C), Min:97 6 °F (36 4 °C), Max:98 5 °F (36 9 °C)    Temp:  [97 6 °F (36 4 °C)-98 5 °F (36 9 °C)] 97 6 °F (36 4 °C)  HR:  [72-78] 77  Resp:  [15-19] 15  BP: ()/(39-56) 107/56  SpO2:  [92 %-97 %] 96 %  Body mass index is 18 28 kg/m²  Input and Output Summary (last 24 hours): Intake/Output Summary (Last 24 hours) at 10/12/2022 1708  Last data filed at 10/11/2022 1801  Gross per 24 hour   Intake 295 ml   Output --   Net 295 ml       Physical Exam:   Physical Exam  Vitals and nursing note reviewed  Constitutional:       General: She is not in acute distress  Appearance: She is well-developed  HENT:      Head: Normocephalic and atraumatic  Mouth/Throat:      Mouth: Mucous membranes are moist    Eyes:      Conjunctiva/sclera: Conjunctivae normal    Cardiovascular:      Rate and Rhythm: Normal rate and regular rhythm  Heart sounds: No murmur heard  Pulmonary:      Effort: Pulmonary effort is normal  No respiratory distress  Breath sounds: Normal breath sounds  Abdominal:      General: Bowel sounds are normal       Palpations: Abdomen is soft  Tenderness: There is no abdominal tenderness  Comments: Peg in place   Musculoskeletal:         General: Normal range of motion  Cervical back: Neck supple  Skin:     General: Skin is warm and dry  Neurological:      Mental Status: She is alert  Mental status is at baseline           Additional Data:     Labs:  Results from last 7 days   Lab Units 10/12/22  1220   WBC Thousand/uL 10 17*   HEMOGLOBIN g/dL 7 4*   HEMATOCRIT % 23 2*   PLATELETS Thousands/uL 504*   NEUTROS PCT % 62   LYMPHS PCT % 14   MONOS PCT % 8   EOS PCT % 15*     Results from last 7 days   Lab Units 10/12/22  1220   SODIUM mmol/L 133*   POTASSIUM mmol/L 4 2   CHLORIDE mmol/L 103   CO2 mmol/L 23   BUN mg/dL 35*   CREATININE mg/dL 1 74*   ANION GAP mmol/L 7   CALCIUM mg/dL 9 1   GLUCOSE RANDOM mg/dL 89         Results from last 7 days   Lab Units 10/12/22  0558 10/11/22  2351 10/11/22  1610 10/11/22  1239 10/11/22  0640 10/11/22  0046 10/10/22  1822 10/10/22  1202 10/10/22  0656 10/09/22  1603 10/09/22  1141 10/09/22  0626   POC GLUCOSE mg/dl 91 95 98 117 105 94 93 96 106 99 104 111               Lines/Drains:  Invasive Devices  Report    Peripheral Intravenous Line  Duration           Peripheral IV 10/08/22 Dorsal (posterior); Right Hand 4 days          Drain  Duration           Gastrostomy/Enterostomy Percutaneous Endoscopic Gastrostomy (PEG) 20 Fr  LUQ 34 days                      Imaging: Reviewed radiology reports from this admission including: CT head    Recent Cultures (last 7 days):   Results from last 7 days   Lab Units 10/09/22  1418 10/09/22  1417   BLOOD CULTURE  No Growth at 72 hrs  No Growth at 72 hrs         Last 24 Hours Medication List:   Current Facility-Administered Medications   Medication Dose Route Frequency Provider Last Rate   • acetaminophen  975 mg Oral Q6H PRN Juan Dodson PA-C     • ALPRAZolam  0 25 mg Oral TID PRN Marck Biggs, DO     • alteplase  2 mg Intracatheter Once Genet Young PA-C     • bisacodyl  10 mg Rectal Daily PRN Wero Roberts MD     • bisacodyl  10 mg Rectal Once Radha Martines MD     • clonazePAM  3 mg Oral HS Jovita Mcdermott, DO     • folic acid  1 mg Oral Daily Jovita Mcdermott, DO     • guaiFENesin  600 mg Oral Q12H [address] Juan Dodson PA-C     • heparin (porcine)  5,000 Units Subcutaneous [facility] Marck Biggs, DO     • levothyroxine  125 mcg Oral Q24H [place] Jie Cuello MD     • metoclopramide  10 mg Intravenous Q6H PRN Wero Roberts MD     • midodrine  10 mg Oral TID AC TOLU Orellana     • ondansetron  4 mg Intravenous Q6H PRN Sil Larios, DO     • oxyCODONE  5 mg Oral Q6H Wero Roberts MD     • pantoprazole  40 mg Oral BID AC Kaylan Arroyo DO     • polyethylene glycol  17 g Per PEG Tube BID Sil Larios, DO     • senna  2 tablet Oral HS Dontrell Henry MD     • sodium bicarbonate  650 mg Oral BID after meals TOLU Scott     • sodium chloride  2 g Oral TID Roberto Teixeira, DO     • thiamine  100 mg Oral Daily Sil Makil, DO     • traZODone  150 mg Oral HS Deja Barcenas DO          Today, Patient Was Seen By: Samreen Elias    **Please Note: This note may have been constructed using a voice recognition system  **

## 2022-10-12 NOTE — ASSESSMENT & PLAN NOTE
· Nephrology following - giving calcium gluconate, D50/insulin, sodium bicarb given October 5th  · Low-potassium pleasure feed diet  · Change tube feeds to Nephro  · k normal now

## 2022-10-12 NOTE — ASSESSMENT & PLAN NOTE
Nephrology following   Hyponatremia the setting of poor solute intake with gastric bypass, continue with salt tablets 2 g t i d , minimize free water flushes with tube feeds    Na- 133

## 2022-10-12 NOTE — PROGRESS NOTES
NEPHROLOGY PROGRESS NOTE   Leigh Ann Gill 76 y o  female MRN: [id number]  Unit/Bed#: [facility] 323-01 Encounter: [id number]      ASSESSMENT & PLAN:  1  Elevated creatinine in the setting of recent sepsis with UTI  Baseline creatinine around 0 8-1 2, now kidney function around 1 5-1 6  Last creatinine 1 74 on 10/10, suspect possible NOAH versus new baseline  Labs this morning needs to be collected  Follow daily labs  Avoid relative hypotension  Avoid nephrotoxins  2  Hyponatremia the setting of poor solute intake with gastric bypass, continue with salt tablets 2 g t i d , minimize free water flushes with tube feeds  Last serum sodium 130 on 10/10  Labs this morning needs to be collected    3  Hyperkalemia, improved, follow low-potassium diet, follow daily labs  Last potassium 5 0 on 10/10  Labs this morning needs to be collected    4  Hypotension, continue with midodrine 10 mg t i d  5  Sepsis, completed antibiotics for UTI  Infection Disease on board, currently monitoring off antibiotics    6  Malnutrition, continue with tube feeds    7  Recent C diff, completed C diff prophylaxis    8  Anemia with recent GI bleeding, monitor H&H, hemoglobin low but stable        SUBJECTIVE:  Patient seen and examined, feeling “terrible”, denies any chest pain, shortness of breath, continue with left-sided abdominal pain  Patient reports she fell yesterday afternoon while was trying to get out of bed to get a pillow, rapid response was called  OBJECTIVE:  Current Weight: Weight - Scale:  (Unable to weigh Pt   Bed not accessible and Pt too weak to get OOB )  Vitals:    10/12/22 0928   BP: (!) 86/48   Pulse: 73   Resp: 16   Temp: 98 5 °F (36 9 °C)   SpO2: 96%       Intake/Output Summary (Last 24 hours) at 10/12/2022 1114  Last data filed at 10/11/2022 1801  Gross per 24 hour   Intake 295 ml   Output --   Net 295 ml       General:  Chronically ill, cooperative, in not acute distress  Eyes: conjunctivae pale, anicteric sclerae  ENT: lips and mucous membranes moist  Neck: supple, no JVD  Chest: clear breath sounds bilateral, no crackles, ronchus or wheezings  CVS: distinct S1 & S2, normal rate, regular rhythm  Abdomen: soft, mildly-tender over left flank, non-distended, normoactive bowel sounds, peg tube in place  Extremities: no edema of both legs  Skin: no rash  Neuro: awake, alert, interactive          Medications:    Current Facility-Administered Medications:   •  acetaminophen (TYLENOL) tablet 975 mg, 975 mg, Oral, Q6H PRN, Kilo Waite PA-C, 975 mg at 09/26/22 2315  •  ALPRAZolam Josphine Fickle) tablet 0 25 mg, 0 25 mg, Oral, TID PRN, Leeann Galloway DO, 0 25 mg at 09/24/22 1616  •  alteplase (CATHFLO) injection 2 mg, 2 mg, Intracatheter, Once, Dionte Rosales PA-C  •  bisacodyl (DULCOLAX) rectal suppository 10 mg, 10 mg, Rectal, Daily PRN, Aliza Wells MD  •  bisacodyl (DULCOLAX) rectal suppository 10 mg, 10 mg, Rectal, Once, Alfreda Hwang MD  •  clonazePAM (KlonoPIN) tablet 3 mg, 3 mg, Oral, HS, Jovitakash Mcdermott DO, 3 mg at 21/05/06 1004  •  folic acid (FOLVITE) tablet 1 mg, 1 mg, Oral, Daily, Jovita Mcdermott DO, 1 mg at 10/12/22 1055  •  guaiFENesin (MUCINEX) 12 hr tablet 600 mg, 600 mg, Oral, Q12H [facility], Aurelia Encinas PA-C, 600 mg at 10/12/22 1055  •  heparin (porcine) subcutaneous injection 5,000 Units, 5,000 Units, Subcutaneous, Q8H [facility], Leeann Galloway DO, 5,000 Units at 10/12/22 2955  •  levothyroxine tablet 125 mcg, 125 mcg, Oral, Q24H, [place]lu[place]isin Alicia Platt MD, 125 mcg at 10/11/22 1330  •  metoclopramide (REGLAN) injection 10 mg, 10 mg, Intravenous, Q6H PRN, Aliza Wells MD  •  midodrine (PROAMATINE) tablet 10 mg, 10 mg, Oral, TID AC, TOLU Orellana, 10 mg at 10/12/22 1055  •  ondansetron (ZOFRAN) injection 4 mg, 4 mg, Intravenous, Q6H PRN, Jovita Mcdermott DO, 4 mg at 09/18/22 1339  •  oxyCODONE (ROXICODONE) IR tablet 5 mg, 5 mg, Oral, Q6H, Aliza Wells MD, 5 mg at 10/12/22 2097  •  pantoprazole (PROTONIX) EC tablet 40 mg, 40 mg, Oral, BID AC, Kaylan Fitzpatrickimchael Bakerel, DO, 40 mg at 10/12/22 1055  •  polyethylene glycol (MIRALAX) packet 17 g, 17 g, Per PEG Tube, BID, Jovita Mcdermott, DO, 17 g at 10/11/22 2145  •  senna (SENOKOT) tablet 17 2 mg, 2 tablet, Oral, HS, Donell Borges MD, 17 2 mg at 10/11/22 2100  •  sodium bicarbonate tablet 650 mg, 650 mg, Oral, BID after meals, TOLU Tristan, 650 mg at 10/12/22 1056  •  sodium chloride tablet 2 g, 2 g, Oral, TID, [employer], DO, 2 g at 10/12/22 1056  •  thiamine tablet 100 mg, 100 mg, Oral, Daily, Jovita Mcdermott, DO, 100 mg at 10/12/22 1055  •  traZODone (DESYREL) tablet 150 mg, 150 mg, Oral, HS, Jovita Mcdermott, DO, 150 mg at 10/11/22 2100    Invasive Devices:        Lab Results:   Results from last 7 days   Lab Units 10/10/22  0500 10/09/22  1416 10/08/22  0500 10/07/22  0554   WBC Thousand/uL 14 78* 13 79*  --  14 20*   HEMOGLOBIN g/dL 8 2* 8 3*  --  7 3*   HEMATOCRIT % 26 3* 26 6*  --  23 9*   PLATELETS Thousands/uL 372 652*  --  539*   SODIUM mmol/L 130* 131* 133* 134*   POTASSIUM mmol/L 5 0 5 5* 5 6* 5 6*   CHLORIDE mmol/L 100 101 104 104   CO2 mmol/L 25 23 24 24   BUN mg/dL 33* 31* 32* 30*   CREATININE mg/dL 1 74* 1 71* 1 53* 1 48*   CALCIUM mg/dL 8 9 8 8 8 5 8 4       Previous work up:  See previous notes      Portions of the record may have been created with voice recognition software  Occasional wrong word or "sound a like" substitutions may have occurred due to the inherent limitations of voice recognition software  Read the chart carefully and recognize, using context, where substitutions have occurred  If you have any questions, please contact the dictating provider

## 2022-10-12 NOTE — PROGRESS NOTES
Progress Note - Infectious Disease   Rock Mann 76 y o  female MRN: [id number]  Unit/Bed#: [facility] 323-01 Encounter: [id number]      Impression/Plan:  1  Sepsis, recurrent   New isolated fever on 10/6/2022 of unclear significance   No workup done for the isolated fever spike but the patient was started on intravenous vancomycin   Patient previously completed previous antibiotic course for UTI as below   Patient remains stable without any localizing symptomatology  Monitor off all antibiotics  If fever recurs, recheck blood cultures x2 sets, check UA C&S, check chest x-ray, and check procalcitonin level  Recheck CBC with diff, BMP  Additional supportive care as per primary     2  Urinary tract infection   Urine cultures noted to be polymicrobial with some degree of resistance   Patient has completed his treatment course with antibiotics and has been stable off all antibiotics except the isolated fever spike   Patient has chronic urinary symptoms that are difficult to interpret  No additional antibiotics  Monitor for current symptoms  Continue to trend fever curve/WBC  If fever recurs, check urinalysis and culture     3  Abnormal abdominal imaging   Patient noted to have some abnormal changes to the gallbladder on imaging   Recent ultrasound unremarkable   Alkaline phosphatase borderline   Patient only localizes discomfort near the PEG tube, adjusted by GI   Repeat CT unremarkable  Serial exams     4  Recent C diff   Repeat PCR testing negative   Patient has completed C diff prophylaxis and has not had recurrence of diarrhea      5  Progressing anemia and recent GI bleed   Hemoglobin downtrended this weekend   Reportedly had some increased stool output   Continue to monitor hemoglobin and stool output      6  Elevated serum creatinine with eosinophilia   Unclear etiology   Renal function stable  No labs yet today    Fluid hydration as per primary  Continue enteral feeding  Consider review of medications as above  Recheck BMP    Discussed the above management plan with the primary service    Antibiotics:  None    Subjective:  Patient has no fever, chills, sweats; no nausea, vomiting, diarrhea; no cough, shortness of breath; no pain  No new symptoms  She apparently suffered a fall yesterday  Objective:  Vitals:  Temp:  [98 °F (36 7 °C)-98 3 °F (36 8 °C)] 98 3 °F (36 8 °C)  HR:  [72-98] 72  Resp:  [15-19] 19  BP: ()/(39-59) 96/50  SpO2:  [92 %-97 %] 95 %  Temp (24hrs), Av 2 °F (36 8 °C), Min:98 °F (36 7 °C), Max:98 3 °F (36 8 °C)  Current: Temperature: 98 3 °F (36 8 °C)    Physical Exam:   General Appearance:  Alert, interactive, bit confused nontoxic, no acute distress  Throat: Oropharynx moist without lesions  Lungs:   Clear to auscultation bilaterally; no wheezes, rhonchi or rales; respirations unlabored   Heart:  RRR; no murmur, rub or gallop   Abdomen:   Soft, non-tender, non-distended, positive bowel sounds  Extremities: No clubbing, cyanosis or edema   Skin: No new rashes or lesions  No draining wounds noted  Labs, Imaging, & Other studies:   All pertinent labs and imaging studies were personally reviewed  Results from last 7 days   Lab Units 10/10/22  0500 10/09/22  1416 10/07/22  0554   WBC Thousand/uL 14 78* 13 79* 14 20*   HEMOGLOBIN g/dL 8 2* 8 3* 7 3*   PLATELETS Thousands/uL 372 652* 539*     Results from last 7 days   Lab Units 10/10/22  0500 10/09/22  1416 10/08/22  0500   SODIUM mmol/L 130* 131* 133*   POTASSIUM mmol/L 5 0 5 5* 5 6*   CHLORIDE mmol/L 100 101 104   CO2 mmol/L 25 23 24   BUN mg/dL 33* 31* 32*   CREATININE mg/dL 1 74* 1 71* 1 53*   EGFR ml/min/1 73sq m 27 28 32   CALCIUM mg/dL 8 9 8 8 8 5     Results from last 7 days   Lab Units 10/09/22  1418 10/09/22  1417   BLOOD CULTURE  No Growth at 48 hrs  No Growth at 48 hrs          CT head-no acute intracranial abnormality    Images personally reviewed by me in PACS

## 2022-10-12 NOTE — WOUND OSTOMY CARE
Progress Note - Wound   Pham Mccabe 76 y o  female MRN: [id number]  Unit/Bed#: [facility] 323-01 Encounter: [id number]        Assessment:   Patient is seen for wound care follow-up  Min assist for turning and repositioning on P-500 specialty mattress  Incontinent x 2  receives tube feeds for poor nutrition intake  BMI of 18 28  Thin and frail in appearance  Patient on strict contact precautions for C  Diff, MDRO, and ESBL       B/L heels in B/L Prevalon Boots - clean, dry, and intact  Recommend Hydraguard for area  Findings:  1  POA Unstageable pressure Injury Sacrum now stage 3 pressure injury: Wound was covered in slough tissue, is now full thickness skin loss with a 90% beefy red and pink granulation tissue wound base with 10% yellow slough tissue  Karyn-wound is erythematic and hyperpigmented  Both Karyn-wound and wound bed are fragile  Scant serosanguineous drainage noted  Continue with Triad and Allevyn Foam Dressing  No induration, fluctuance, odor, warmth/temperature differences,  or purulence noted to the above noted wounds and skin areas assessed  New dressings applied per orders listed below  Patient tolerated well- no s/s of non-verbal pain or discomfort observed during the encounter  Bedside nurse aware of plan of care  See flow sheets for more detailed assessment findings  Orders listed below and wound care will continue to follow, call or tiger text with questions  Skin care Plan:  1-Cleanse sacro-buttocks with soap and water  Apply Triad paste to wound bed and cover with Allevyn foam  Sami with T for treatment  Change everyday and PRN  2-Turn/reposition q2h or when medically stable for pressure re-distribution on skin   3-Elevate heels to offload pressure  4-Moisturize skin daily with skin nourishing cream  5-Ehob cushion in chair when out of bed    6-Preventative Hydraguard B/L heels BID and PRN  7-P-500 specialty mattress         WOUNDS:  Wound 07/26/22 Pressure Injury Sacrum Mid (Active)   Wound Image   10/12/22 0953   Wound Description Beefy red;Fragile;Pink;Slough 10/12/22 0953   Pressure Injury Stage 3 10/12/22 0953   Karyn-wound Assessment Hyperpigmented;Erythema 10/12/22 0953   Wound Length (cm) 1 9 cm 10/12/22 0953   Wound Width (cm) 0 8 cm 10/12/22 0953   Wound Depth (cm) 0 4 cm 10/12/22 0953   Wound Surface Area (cm^2) 1 52 cm^2 10/12/22 0953   Wound Volume (cm^3) 0 608 cm^3 10/12/22 0953   Calculated Wound Volume (cm^3) 0 61 cm^3 10/12/22 0953   Change in Wound Size % -205 10/12/22 0953   Tunneling 0 cm 10/12/22 0953   Tunneling in depth located at 0 10/12/22 0953   Undermining 0 10/12/22 0953   Undermining is depth extending from 0 10/12/22 0953   Wound Site Closure ALICIA 10/12/22 0953   Drainage Amount Scant 10/12/22 0953   Drainage Description Serosanguineous 10/12/22 0953   Non-staged Wound Description Full thickness 10/12/22 0953   Treatments Cleansed;Site care 10/12/22 0953   Dressing Other (Comment); Foam, Silicon (eg  Allevyn, etc) 10/12/22 0953   Wound packed? No 10/12/22 0953   Packing- # removed 0 10/12/22 0953   Packing- # inserted 0 10/12/22 0953   Dressing Changed Changed 10/12/22 0953   Patient Tolerance Tolerated well 10/12/22 0953   Dressing Status Clean;Dry; Intact 10/12/22 Shannon 50 RN, BSN

## 2022-10-12 NOTE — ASSESSMENT & PLAN NOTE
Today on 10/11/22 afternoon and rapid response was called for fall  -as per RR note and per patient, she was leaning forward , getting gou tof the bed  to grab a pillow, her leg got caught  and she fell  -denies head strike, loss of consciousness, pain, any lightheadaness, diaphoresis before or after fall  -patient moving all extremities after  Fall  - vital signs stable  -patient was placed on soft C-collar and CT head as well as cervical spine was obtained stat-result showed no fracture or subluxation  -fall precaution  -d/w brother Bon Crowell and updated her status/incident of fall   Also discussed regardign Bartolomeanuja Burger

## 2022-10-13 NOTE — PROGRESS NOTES
Progress Note - Infectious Disease   Pepito Simmons 76 y o  female MRN: [id number]  Unit/Bed#: [facility] 323-01 Encounter: [id number]      Impression/Plan:  1  Sepsis, recurrent   New isolated fever on 10/6/2022 of unclear significance   No workup done for the isolated fever spike but the patient was started on intravenous vancomycin   Patient previously completed previous antibiotic course for UTI as below   Patient remains stable without any localizing symptomatology  No recurrence of the fever off all antibiotics  Monitor off all antibiotics  If fever recurs, recheck blood cultures x2 sets, check UA C&S, check chest x-ray, and check procalcitonin level  Recheck CBC with diff, BMP  Additional supportive care as per primary     2  Urinary tract infection   Urine cultures noted to be polymicrobial with some degree of resistance   Patient has completed his treatment course with antibiotics and has been stable off all antibiotics except the isolated fever spike   Patient has chronic urinary symptoms that are difficult to interpret  No additional antibiotics  Monitor for current symptoms  Continue to trend fever curve/WBC  If fever recurs, check urinalysis and culture     3  Abnormal abdominal imaging   Patient noted to have some abnormal changes to the gallbladder on imaging   Recent ultrasound unremarkable   Alkaline phosphatase borderline   Patient only localizes discomfort near the PEG tube, adjusted by GI   Repeat CT unremarkable    Serial exams     4  Recent C diff   Repeat PCR testing negative   Patient has completed C diff prophylaxis and has not had recurrence of diarrhea      5  Progressing anemia and recent GI bleed   Hemoglobin downtrended this weekend   Reportedly had some increased stool output   Continue to monitor hemoglobin and stool output      6  Elevated serum creatinine with eosinophilia   Unclear etiology   Renal function stable    Volume management  Nephrology follow-up  Recheck BMP    Discussed the above management plan with the primary service  No active infectious disease issues  We will sign off  Please call if questions  Antibiotics:  None    Subjective:  Patient has no fever, chills, sweats; no nausea, vomiting, diarrhea; no cough, shortness of breath; no pain  No new symptoms  Objective:  Vitals:  Temp:  [97 6 °F (36 4 °C)-99 5 °F (37 5 °C)] 99 3 °F (37 4 °C)  HR:  [77-92] 92  Resp:  [15-16] 16  BP: (106-113)/(56-61) 106/56  SpO2:  [93 %-97 %] 93 %  Temp (24hrs), Av 8 °F (37 1 °C), Min:97 6 °F (36 4 °C), Max:99 5 °F (37 5 °C)  Current: Temperature: 99 3 °F (37 4 °C)    Physical Exam:   General Appearance:  Alert, interactive, nontoxic, no acute distress  Throat: Oropharynx moist without lesions  Lungs:   Clear to auscultation bilaterally; no wheezes, rhonchi or rales; respirations unlabored   Heart:  RRR; no murmur, rub or gallop   Abdomen:   Soft, non-tender, non-distended, positive bowel sounds  Extremities: No clubbing, cyanosis or edema   Skin: No new rashes or lesions  No draining wounds noted  Labs, Imaging, & Other studies:   All pertinent labs and imaging studies were personally reviewed  Results from last 7 days   Lab Units 10/12/22  1220 10/10/22  0500 10/09/22  1416   WBC Thousand/uL 10 17* 14 78* 13 79*   HEMOGLOBIN g/dL 7 4* 8 2* 8 3*   PLATELETS Thousands/uL 504* 372 652*     Results from last 7 days   Lab Units 10/12/22  1220 10/10/22  0500 10/09/22  1416   SODIUM mmol/L 133* 130* 131*   POTASSIUM mmol/L 4 2 5 0 5 5*   CHLORIDE mmol/L 103 100 101   CO2 mmol/L 23 25 23   BUN mg/dL 35* 33* 31*   CREATININE mg/dL 1 74* 1 74* 1 71*   EGFR ml/min/1 73sq m 27 27 28   CALCIUM mg/dL 9 1 8 9 8 8     Results from last 7 days   Lab Units 10/09/22  1418 10/09/22  1417   BLOOD CULTURE  No Growth at 72 hrs  No Growth at 72 hrs

## 2022-10-13 NOTE — ASSESSMENT & PLAN NOTE
· Evaluated by palliative care  Wants to continue medical directed treatments with limits of DNR/DNI  · Spoke with patient's brother Cory Mello on 10/11/22 who is power of  ( but financial power of ), We discussed goals of care  Brother, Cory Mello agrees that we need to address palliative/comfort option again given her failure to thrive  He will discuss with rest of the siblings and they all will discussed with patient  In next few days he will update us wether they  would like to have a meeting with palliative/hospice again  Called Cory Mello today for f/u- no response  Called LUCIA- reji Locke- wife responded- explained to her that I had spoken with Cory Mlelo and to see if they had a family meeting  Also today patient did express that she feels comfort care is the route to go- reached out to Palliative team for follow up discussion on [address] and family meeting

## 2022-10-13 NOTE — PROGRESS NOTES
NEPHROLOGY PROGRESS NOTE   Leigh Ann Gill 76 y o  female MRN: [id number]  Unit/Bed#: [facility] 323-01 Encounter: [id number]      ASSESSMENT & PLAN:  1  Elevated creatinine in the setting of recent sepsis with UTI  Baseline creatinine around 0 8-1 2, now kidney function around 1 5-1 6  Last creatinine 1 7 yesterday, likely new baseline  Follow daily labs  Avoid relative hypotension  Avoid nephrotoxins  2  Hyponatremia the setting of poor solute intake with gastric bypass, continue with salt tablets 2 g t i d , minimize free water flushes with tube feeds  Serum sodium improving up to 133 yesterday    3  Hyperkalemia, improved, follow low-potassium diet, follow daily labs  Last potassium 5 0 on 10/10  Labs this morning needs to be collected    4  Hypotension, blood pressure stable, continue with midodrine 10 mg t i d  5  Sepsis, completed antibiotics for UTI  Infection Disease on board, currently monitoring off antibiotics    6  Malnutrition, continue with tube feeds    7  Recent C diff, completed C diff prophylaxis    8  Anemia with recent GI bleeding, monitor H&H, noted hemoglobin dropped to 7 4 yesterday, recommend to follow daily CBCs        SUBJECTIVE:  Patient seen and examined, feeling okay this morning  Denies any complaints, chest pain, shortness of breath, no nausea, no vomiting, continue with some mild left-sided abdominal pain, denies any constipation or diarrhea      OBJECTIVE:  Current Weight: Weight - Scale:  (Unable to weigh Pt   Bed not accessible and Pt too weak to get OOB )  Vitals:    10/13/22 1011   BP: 106/56   Pulse: 92   Resp: 16   Temp: 99 3 °F (37 4 °C)   SpO2: 93%       Intake/Output Summary (Last 24 hours) at 10/13/2022 1045  Last data filed at 10/13/2022 0547  Gross per 24 hour   Intake --   Output 1000 ml   Net -1000 ml       General:  Chronically ill, cooperative, in not acute distress  Eyes: conjunctivae pale, anicteric sclerae  ENT: lips and mucous membranes moist, on room air  Neck: supple, no JVD  Chest: clear breath sounds bilateral, no crackles, ronchus or wheezings  CVS: distinct S1 & S2, normal rate, regular rhythm  Abdomen: soft, non-tender, non-distended, normoactive bowel sounds, peg tube in place  Extremities: no edema of both legs  Skin: no rash  Neuro: awake, alert, oriented        Medications:    Current Facility-Administered Medications:   •  acetaminophen (TYLENOL) tablet 975 mg, 975 mg, Oral, Q6H PRN, Neo Neely PA-C, 975 mg at 09/26/22 2315  •  ALPRAZolam Aguilar Silas) tablet 0 25 mg, 0 25 mg, Oral, TID PRN, Valorie Narvaez DO, 0 25 mg at 09/24/22 1616  •  alteplase (CATHFLO) injection 2 mg, 2 mg, Intracatheter, Once, Larry Sevilla PA-C  •  bisacodyl (DULCOLAX) rectal suppository 10 mg, 10 mg, Rectal, Daily PRN, Morena Presley MD  •  bisacodyl (DULCOLAX) rectal suppository 10 mg, 10 mg, Rectal, Once, Willis Alejo MD  •  clonazePAM (KlonoPIN) tablet 3 mg, 3 mg, Oral, HS, Jovita McdermottDO beata, 3 mg at 85/05/09 1019  •  folic acid (FOLVITE) tablet 1 mg, 1 mg, Oral, Daily, Jovita Mcdermott DO, 1 mg at 10/12/22 1055  •  guaiFENesin (MUCINEX) 12 hr tablet 600 mg, 600 mg, Oral, Q12H [facility], Aurelia Encinas PA-C, 600 mg at 10/12/22 2150  •  heparin (porcine) subcutaneous injection 5,000 Units, 5,000 Units, Subcutaneous, Q8H [facility], Valorie Narvaez DO, 5,000 Units at 10/13/22 0543  •  levothyroxine tablet 125 mcg, 125 mcg, Oral, Q24H, Oluwatomisin Viktoriya Nash MD, 125 mcg at 10/12/22 1639  •  metoclopramide (REGLAN) injection 10 mg, 10 mg, Intravenous, Q6H PRN, Morena Presley MD  •  midodrine (PROAMATINE) tablet 10 mg, 10 mg, Oral, TID AC, TOLU Orellana, 10 mg at 10/13/22 0544  •  ondansetron (ZOFRAN) injection 4 mg, 4 mg, Intravenous, Q6H PRN, Jovita Mcdermott DO, 4 mg at 09/18/22 1339  •  oxyCODONE (ROXICODONE) IR tablet 5 mg, 5 mg, Oral, Q6H, Morena Presley MD, 5 mg at 10/12/22 1759  •  pantoprazole (PROTONIX) EC tablet 40 mg, 40 mg, Oral, BID AC, Mitzie Lanes Christconstance Fungt, DO, 40 mg at 10/13/22 0544  •  polyethylene glycol (MIRALAX) packet 17 g, 17 g, Per PEG Tube, BID, Jovita Mcdermott, DO, 17 g at 10/11/22 2145  •  senna (SENOKOT) tablet 17 2 mg, 2 tablet, Oral, HS, Lucinda Pérez MD, 17 2 mg at 10/11/22 2100  •  sodium bicarbonate tablet 650 mg, 650 mg, Oral, BID after meals, TOLU Willoughby, 650 mg at 10/12/22 1754  •  sodium chloride tablet 2 g, 2 g, Oral, TID, Sonny Sebastián, DO, 2 g at 10/12/22 2150  •  thiamine tablet 100 mg, 100 mg, Oral, Daily, Jovita Mcdermott, DO, 100 mg at 10/12/22 1055  •  traZODone (DESYREL) tablet 150 mg, 150 mg, Oral, HS, Jovita Mcdermott, DO, 150 mg at 10/12/22 2150    Invasive Devices:        Lab Results:   Results from last 7 days   Lab Units 10/12/22  1220 10/10/22  0500 10/09/22  1416   WBC Thousand/uL 10 17* 14 78* 13 79*   HEMOGLOBIN g/dL 7 4* 8 2* 8 3*   HEMATOCRIT % 23 2* 26 3* 26 6*   PLATELETS Thousands/uL 504* 372 652*   SODIUM mmol/L 133* 130* 131*   POTASSIUM mmol/L 4 2 5 0 5 5*   CHLORIDE mmol/L 103 100 101   CO2 mmol/L 23 25 23   BUN mg/dL 35* 33* 31*   CREATININE mg/dL 1 74* 1 74* 1 71*   CALCIUM mg/dL 9 1 8 9 8 8       Previous work up:  See previous notes      Portions of the record may have been created with voice recognition software  Occasional wrong word or "sound a like" substitutions may have occurred due to the inherent limitations of voice recognition software  Read the chart carefully and recognize, using context, where substitutions have occurred  If you have any questions, please contact the dictating provider

## 2022-10-13 NOTE — ASSESSMENT & PLAN NOTE
· Initially met criteria with fever, tachypnea, tachycardia, and leukocytosis on admission  Sepsis secondary to recurrent bacteremia and PICC line infections  · PICC line has been removed and patient had PEG tube placed for nutritional support  · Finished full course of ertapenem for ESBL UTI  · Patient with recurrent fever 10/7/22- Started empiric vancomycin whic was dced  · Discussed with infectious disease     observe off of antibiotic , cultures- sent on 10/9/22- NGTD

## 2022-10-13 NOTE — OCCUPATIONAL THERAPY NOTE
Occupational Therapy    10/13/22 0305   OT Last Visit   OT Visit Date 10/13/22   Note Type   Note Type Treatment   Cancel Reasons Other  (Pt refusing OT trx at this time  Pt is adamant that she does not need therapy, reports feels too tired  Pt educated on importance of mobility and engaging in ADLs  Pt continued to refuse   Will follow-up w/pt as clinical course allows )       Patient Name: Bernabe Linda  ASVWK'S Date: 10/13/2022    Georgina Saavedra 13

## 2022-10-13 NOTE — PROGRESS NOTES
Spiritual Care Progress Note    10/13/2022  Patient: Cameron Smith : 1945  Admission Date & Time: 2022 2349  MRN: [id number] CSN: [id number]      Natali Kuhn visited pt at the request of family to provide [facility] MAGGIE, a blessing, and prayer  Chaplains remain available                 Chaplaincy Interventions Utilized:   Empowerment: Provided chaplaincy education    Exploration: Explored spiritual needs & resources    Collaboration: Facilitated respect for spiritual/cultural practice during hospitalization    Relationship Building: Cultivated a relationship of care and support    Ritual: Provided prayer and Provided ritual         10/13/22 1500   Clinical Encounter Type   Visited With Patient   Routine Visit Follow-up   Mosque Encounters   Mosque Needs Prayer   Sacramental Encounters   Communion Given Indicator Yes   Sacrament Other Other (Comment)  ([place])

## 2022-10-13 NOTE — PHYSICAL THERAPY NOTE
Physical Therapy Cancellation Note         10/13/22 1215   PT Last Visit   PT Visit Date 10/13/22   Note Type   Note Type Cancelled Session   Cancel Reasons Other     PT attempted to see pt for tx session  On approach pt agitated and adamently refusing to participate in mobilization OOB  Educated on importance of participation w/ poor response  PT then offering TE in bed w/ pt stating "I already told you no  I don't need that   I do it on my own "     Gabriel Frames

## 2022-10-13 NOTE — PROGRESS NOTES
[address] [facility]  Progress Note - Cameron Smith 1945, 76 y o  female MRN: [id number]  Unit/Bed#: [facility] 323-01 Encounter: [id number]  Primary Care Provider: Refugio Almanza MD   Date and time admitted to hospital: 8/30/2022 11:49 PM    * Sepsis [facility])  Assessment & Plan  · Initially met criteria with fever, tachypnea, tachycardia, and leukocytosis on admission  Sepsis secondary to recurrent bacteremia and PICC line infections  · PICC line has been removed and patient had PEG tube placed for nutritional support  · Finished full course of ertapenem for ESBL UTI  · Patient with recurrent fever 10/7/22- Started empiric vancomycin whic was dced  · Discussed with infectious disease  observe off of antibiotic , cultures- sent on 10/9/22- NGTD    Bacteremia  Assessment & Plan  · Recent hx of staph epi bacteremia in 7/2022, presumed due to picc line  · Recurrence 1/2 set staph epi, 2nd set without growth  Was treated with intravenous vancomycin for presumed line infection  · S/p TAVIA (9/9) no evidence of vegetation  · Blood cultures had been negative however repeated again due to isolated fever which are negative to date  · PICC and TPN discontinued, now has PEG tube  · Monitor off abx    NOAH (acute kidney injury) ([facility] [address] )  Assessment & Plan  · Renal function appears to have stabilized around creatinine of 1 5-1 7  · Creatinine up-trending today -  · Possibly new baseline per Nephrology  · Monitor BMP    H/O bariatric surgery - bypass  Assessment & Plan  · History of Savage-en-Y gastric bypass  Has had significant issues over the last 6 months or so with chronic malnutrition and an anastomotic ulcerations  When last seen by bariatric surgery, it was recommended to continue on prolonged TPN for nutritional optimization with possible revision of the Savage-en-Y in the future    · Given her recurrence PICC line infections and bacteremia, TPN has been discontinued  · Continue tube feeds for nutritional optimization  · Will need significant improvement before consideration can be given to further surgical intervention      Hyponatremia  Assessment & Plan    Nephrology following   Hyponatremia the setting of poor solute intake with gastric bypass, continue with salt tablets 2 g t i d , minimize free water flushes with tube feeds  Na- 133        Hyperkalemia  Assessment & Plan    · Nephrology following - giving calcium gluconate, D50/insulin, sodium bicarb given October 5th  · Low-potassium pleasure feed diet  · Change tube feeds to Nephro  · k normal now    Urinary retention  Assessment & Plan  · Has required intermittent catheterizations however now voiding spontaneously  · Continue retention protocol    Goals of care, counseling/discussion  Assessment & Plan  · Evaluated by palliative care  Wants to continue medical directed treatments with limits of DNR/DNI  · Spoke with patient's brother Abhinav Evans on 10/11/22 who is power of  ( but financial power of ), We discussed goals of care  BrotherAbhinav agrees that we need to address palliative/comfort option again given her failure to thrive  He will discuss with rest of the siblings and they all will discussed with patient  In next few days he will update us wether they  would like to have a meeting with palliative/hospice again  Called Abhinav Evans today for f/u- no response  Called LUCIA- reji Locke- wife responded- explained to her that I had spoken with Abhinav Evans and to see if they had a family meeting  Also today patient did express that she feels comfort care is the route to go- reached out to Palliative team for follow up discussion on [address] and family meeting        Acute on chronic anemia  Assessment & Plan  · 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  · S/p recent EGD in 7/22 revealing: Residual marginal ulcer of the gastrojejunostomy anastomosis with improved size  · S/p EGD 9/2 revealing: Large, cratered ulcer in the gastrojejunal anastomosis   · Status post 1 unit PRBCs this admission  · Continue PPI b i d      Moderate protein-calorie malnutrition ([address] )  Assessment & Plan  Malnutrition Findings:   Adult Malnutrition type: Chronic illness  Adult Degree of Malnutrition: Other severe protein calorie malnutrition  Malnutrition Characteristics: Weight loss, Muscle loss       360 Statement: Severe/Chronic malnutrition r/t condition, as evidenced by 4 8% wt loss x < 1 week (9/2/22: 125#, 9/5/22: 119#), and severe muscle mass depletion (temples/clavicle)  Treated with liberalized diet and nutrition supplements, possibly nutrition support pending goals of care    BMI Findings: Body mass index is 18 28 kg/m²  · Continue tube feeds    Diarrhea  Assessment & Plan  · Recent history of C diff colitis  · Treated with prophylactic dose of p o  Vancomycin while on antibiotics     Ambulatory dysfunction  Assessment & Plan  · Will need rehab on discharge      Abnormal CT scan  Assessment & Plan  · Noted on CT C/A/P 9/26  - Focal thickening of the lower portion of the endometrium  While this is not expected in a patient of this age, this appears stable from 2016 suggesting a benign etiology  If there is clinical concern, follow-up ultrasound is recommended  - Small left upper lobe nodules, one of which is new from March 2022  Given the history of smoking, a follow-up chest CT is recommended in 12 months  · This was d/w sister and brother POA over phone by prior provider 9/28    Hypothyroidism  Assessment & Plan  · TSH elevated and fT4 low  · Endocrinology consulted - levothyroxine timing was adjusted to 2pm per Endocrinology recs (4 hours after tube feeds have stopped running)  · Repeat TFTs in 1 week per Endocrine          VTE Pharmacologic Prophylaxis: VTE Score: 3 Moderate Risk (Score 3-4) - Pharmacological DVT Prophylaxis Ordered: heparin      Patient Centered Rounds: I performed bedside rounds with nursing staff today   Discussions with Specialists or Other Care Team Provider:     Education and Discussions with Family / Patient: Updated  (sister) via phone  Time Spent for Care: 20 minutes  More than 50% of total time spent on counseling and coordination of care as described above  Current Length of Stay: 43 day(s)  Current Patient Status: Inpatient   Certification Statement: The patient will continue to require additional inpatient hospital stay due to dc palnning  Discharge Plan: Anticipate discharge in 24-48 hrs to prior assisted or independent living facility  Code Status: Level 3 - DNAR and DNI    Subjective:   Resting in bed, she seems to be the most cohorent  today and in a good mood  Denied any chest pain or shortness of breath no nausea or vomiting  She gazed in conversation regarding her goals of care  She did mention that she feels like comfort care is probably the route to go for her  Will need to speak with the rest of the family members    Objective:     Vitals:   Temp (24hrs), Av 3 °F (37 4 °C), Min:99 1 °F (37 3 °C), Max:99 5 °F (37 5 °C)    Temp:  [99 1 °F (37 3 °C)-99 5 °F (37 5 °C)] 99 1 °F (37 3 °C)  HR:  [79-92] 79  Resp:  [16-18] 18  BP: (106-113)/(55-61) 113/55  SpO2:  [93 %-95 %] 95 %  Body mass index is 18 28 kg/m²  Input and Output Summary (last 24 hours): Intake/Output Summary (Last 24 hours) at 10/13/2022 1746  Last data filed at 10/13/2022 0547  Gross per 24 hour   Intake --   Output 1000 ml   Net -1000 ml       Physical Exam:   Physical Exam  Vitals and nursing note reviewed  Constitutional:       General: She is not in acute distress  Appearance: She is well-developed  Comments: Elderly frail   HENT:      Head: Normocephalic and atraumatic  Mouth/Throat:      Mouth: Mucous membranes are moist    Eyes:      Conjunctiva/sclera: Conjunctivae normal    Cardiovascular:      Rate and Rhythm: Normal rate and regular rhythm  Heart sounds:  No murmur heard  Pulmonary:      Effort: Pulmonary effort is normal  No respiratory distress  Breath sounds: Normal breath sounds  Abdominal:      Palpations: Abdomen is soft  Tenderness: There is no abdominal tenderness  Comments: Peg in place   Musculoskeletal:         General: Normal range of motion  Cervical back: Neck supple  Skin:     General: Skin is warm and dry  Neurological:      Mental Status: She is alert  Mental status is at baseline  Additional Data:     Labs:  Results from last 7 days   Lab Units 10/12/22  1220   WBC Thousand/uL 10 17*   HEMOGLOBIN g/dL 7 4*   HEMATOCRIT % 23 2*   PLATELETS Thousands/uL 504*   NEUTROS PCT % 62   LYMPHS PCT % 14   MONOS PCT % 8   EOS PCT % 15*     Results from last 7 days   Lab Units 10/12/22  1220   SODIUM mmol/L 133*   POTASSIUM mmol/L 4 2   CHLORIDE mmol/L 103   CO2 mmol/L 23   BUN mg/dL 35*   CREATININE mg/dL 1 74*   ANION GAP mmol/L 7   CALCIUM mg/dL 9 1   GLUCOSE RANDOM mg/dL 89         Results from last 7 days   Lab Units 10/13/22  1645 10/13/22  1135 10/13/22  0541 10/12/22  0558 10/11/22  2351 10/11/22  1610 10/11/22  1239 10/11/22  0640 10/11/22  0046 10/10/22  1822 10/10/22  1202 10/10/22  0656   POC GLUCOSE mg/dl 91 103 104 91 95 98 117 105 94 93 96 106               Lines/Drains:  Invasive Devices  Report    Peripheral Intravenous Line  Duration           Peripheral IV 10/08/22 Dorsal (posterior); Right Hand 5 days          Drain  Duration           Gastrostomy/Enterostomy Percutaneous Endoscopic Gastrostomy (PEG) 20 Fr  LUQ 35 days                      Imaging: Reviewed radiology reports from this admission including: chest xray    Recent Cultures (last 7 days):   Results from last 7 days   Lab Units 10/09/22  1418 10/09/22  1417   BLOOD CULTURE  No Growth After 4 Days  No Growth After 4 Days         Last 24 Hours Medication List:   Current Facility-Administered Medications   Medication Dose Route Frequency Provider Last Rate   • acetaminophen  975 mg Oral Q6H PRN Jg Olson PA-C     • ALPRAZolam  0 25 mg Oral TID PRN Ck Leaks, DO     • alteplase  2 mg Intracatheter Once [employer]iant YNES Sevilla     • bisacodyl  10 mg Rectal Daily PRN Steve Cazares MD     • bisacodyl  10 mg Rectal Once Dora Smith MD     • clonazePAM  3 mg Oral HS Jovitakash Mcdermott, DO     • folic acid  1 mg Oral Daily Jovita Mcdermott, DO     • guaiFENesin  600 mg Oral Q12H [facility] Jg Olson PA-C     • heparin (porcine)  5,000 Units Subcutaneous [facility] Stover Leaks, DO     • levothyroxine  125 mcg Oral Q24H [place] Kevan Aviles MD     • metoclopramide  10 mg Intravenous Q6H PRN Steve Cazares MD     • midodrine  10 mg Oral TID AC TOLU Orellana     • ondansetron  4 mg Intravenous Q6H PRN Muna Dominguez, DO     • oxyCODONE  5 mg Oral Q6H Steve Cazares MD     • pantoprazole  40 mg Oral BID AC Kaylan Arroyo, DO     • polyethylene glycol  17 g Per PEG Tube BID Muna Dominguez, DO     • senna  2 tablet Oral HS Emily Preston MD     • sodium bicarbonate  650 mg Oral BID after meals TOLU Villegas     • sodium chloride  2 g Oral TID Flor Mcdaniel, DO     • thiamine  100 mg Oral Daily Jovitakash Mcdermott, DO     • traZODone  150 mg Oral HS Muna Dominguez, DO          Today, Patient Was Seen By: Dora Smith    **Please Note: This note may have been constructed using a voice recognition system  **

## 2022-10-14 NOTE — PLAN OF CARE
Problem: Potential for Falls  Goal: Patient will remain free of falls  Description: INTERVENTIONS:  - Educate patient/family on patient safety including physical limitations  - Instruct patient to call for assistance with activity   - Consult OT/PT to assist with strengthening/mobility   - Keep Call bell within reach  - Keep bed low and locked with side rails adjusted as appropriate  - Keep care items and personal belongings within reach  - Initiate and maintain comfort rounds  - Make Fall Risk Sign visible to staff  - Offer Toileting every 2 Hours, in advance of need  - Initiate/Maintain bed alarm  - Obtain necessary fall risk management equipment  - Apply yellow socks and bracelet for high fall risk patients  - Consider moving patient to room near nurses station  Outcome: Progressing     Problem: MOBILITY - ADULT  Goal: Maintain or return to baseline ADL function  Description: INTERVENTIONS:  -  Assess patient's ability to carry out ADLs; assess patient's baseline for ADL function and identify physical deficits which impact ability to perform ADLs (bathing, care of mouth/teeth, toileting, grooming, dressing, etc )  - Assess/evaluate cause of self-care deficits   - Assess range of motion  - Assess patient's mobility; develop plan if impaired  - Assess patient's need for assistive devices and provide as appropriate  - Encourage maximum independence but intervene and supervise when necessary  - Involve family in performance of ADLs  - Assess for home care needs following discharge   - Consider OT consult to assist with ADL evaluation and planning for discharge  - Provide patient education as appropriate  Outcome: Progressing  Goal: Maintains/Returns to pre admission functional level  Description: INTERVENTIONS:  - Perform BMAT or MOVE assessment daily    - Set and communicate daily mobility goal to care team and patient/family/caregiver     - Collaborate with rehabilitation services on mobility goals if consulted  - Perform Range of Motion 4 times a day  - Reposition patient every 2 hours    - Dangle patient 4 times a day  - Stand patient 3 times a day  - Ambulate patient 3 times a day  - Out of bed to chair 3 times a day   - Out of bed for meals 3 times a day  - Out of bed for toileting  - Record patient progress and toleration of activity level   Outcome: Progressing     Problem: INFECTION - ADULT  Goal: Absence or prevention of progression during hospitalization  Description: INTERVENTIONS:  - Assess and monitor for signs and symptoms of infection  - Monitor lab/diagnostic results  - Monitor all insertion sites, i e  indwelling lines, tubes, and drains  - Monitor endotracheal if appropriate and nasal secretions for changes in amount and color  - [place] appropriate cooling/warming therapies per order  - Administer medications as ordered  - Instruct and encourage patient and family to use good hand hygiene technique  - Identify and instruct in appropriate isolation precautions for identified infection/condition  Outcome: Progressing  Goal: Absence of fever/infection during neutropenic period  Description: INTERVENTIONS:  - Monitor WBC    Outcome: Progressing     Problem: SAFETY ADULT  Goal: Patient will remain free of falls  Description: INTERVENTIONS:  - Educate patient/family on patient safety including physical limitations  - Instruct patient to call for assistance with activity   - Consult OT/PT to assist with strengthening/mobility   - Keep Call bell within reach  - Keep bed low and locked with side rails adjusted as appropriate  - Keep care items and personal belongings within reach  - Initiate and maintain comfort rounds  - Make Fall Risk Sign visible to staff  - Offer Toileting every 2 Hours, in advance of need  - Initiate/Maintain bed alarm  - Obtain necessary fall risk management equipment  - Apply yellow socks and bracelet for high fall risk patients  - Consider moving patient to room near nurses station  Outcome: Progressing  Goal: Maintain or return to baseline ADL function  Description: INTERVENTIONS:  -  Assess patient's ability to carry out ADLs; assess patient's baseline for ADL function and identify physical deficits which impact ability to perform ADLs (bathing, care of mouth/teeth, toileting, grooming, dressing, etc )  - Assess/evaluate cause of self-care deficits   - Assess range of motion  - Assess patient's mobility; develop plan if impaired  - Assess patient's need for assistive devices and provide as appropriate  - Encourage maximum independence but intervene and supervise when necessary  - Involve family in performance of ADLs  - Assess for home care needs following discharge   - Consider OT consult to assist with ADL evaluation and planning for discharge  - Provide patient education as appropriate  Outcome: Progressing  Goal: Maintains/Returns to pre admission functional level  Description: INTERVENTIONS:  - Perform BMAT or MOVE assessment daily    - Set and communicate daily mobility goal to care team and patient/family/caregiver  - Collaborate with rehabilitation services on mobility goals if consulted  - Perform Range of Motion 4 times a day  - Reposition patient every 2 hours    - Dangle patient 4 times a day  - Stand patient 3 times a day  - Ambulate patient 3 times a day  - Out of bed to chair 3 times a day   - Out of bed for meals 3 times a day  - Out of bed for toileting  - Record patient progress and toleration of activity level   Outcome: Progressing     Problem: DISCHARGE PLANNING  Goal: Discharge to home or other facility with appropriate resources  Description: INTERVENTIONS:  - Identify barriers to discharge w/patient and caregiver  - Arrange for needed discharge resources and transportation as appropriate  - Identify discharge learning needs (meds, wound care, etc )  - Arrange for interpretive services to assist at discharge as needed  - Refer to Case Management Department for coordinating discharge planning if the patient needs post-hospital services based on physician/advanced practitioner order or complex needs related to functional status, cognitive ability, or social support system  Outcome: Progressing     Problem: Knowledge Deficit  Goal: Patient/family/caregiver demonstrates understanding of disease process, treatment plan, medications, and discharge instructions  Description: Complete learning assessment and assess knowledge base    Interventions:  - Provide teaching at level of understanding  - Provide teaching via preferred learning methods  Outcome: Progressing     Problem: SKIN/TISSUE INTEGRITY - ADULT  Goal: Skin Integrity remains intact(Skin Breakdown Prevention)  Description: Assess:  -Perform Gabe assessment   -Clean and moisturize skin  -Inspect skin when repositioning, toileting, and assisting with ADLS  -Assess under medical devices   -Assess extremities for adequate circulation and sensation     Bed Management:  -Have minimal linens on bed & keep smooth, unwrinkled  -Change linens as needed when moist or perspiring  -Avoid sitting or lying in one position for more than 2 hours while in bed  -Keep HOB at 30degrees     Toileting:  -Offer bedside commode  -Assess for incontinence  -Use incontinent care products after each incontinent episode    Activity:  -Mobilize patient 4 times a day  -Encourage activity and walks on unit  -Encourage or provide ROM exercises   -Turn and reposition patient every 2 Hours  -Use appropriate equipment to lift or move patient in bed  -Instruct/ Assist with weight shifting every hour when out of bed in chair  -Consider limitation of chair time 1 hour intervals    Skin Care:  -Avoid use of baby powder, tape, friction and shearing, hot water or constrictive clothing  -Relieve pressure over bony prominences  -Do not massage red bony areas    Next Steps:  -Teach patient strategies to minimize risks   -Consider consults to  interdisciplinary teams  Outcome: Progressing  Goal: Incision(s), wounds(s) or drain site(s) healing without S/S of infection  Description: INTERVENTIONS  - Assess and document dressing, incision, wound bed, drain sites and surrounding tissue  - Provide patient and family education  - Perform skin care/dressing changes  Outcome: Progressing  Goal: Pressure injury heals and does not worsen  Description: Interventions:  - Implement low air loss mattress or specialty surface (Criteria met)  - Apply silicone foam dressing  - Instruct/assist with weight shifting every 30  minutes when in chair   - Limit chair time to 2 hour intervals  - Use special pressure reducing interventions such as waffle cushion when in chair   - Apply fecal or urinary incontinence containment device   - Perform passive or active ROM every hour  - Turn and reposition patient & offload bony prominences every 2 hours   - Utilize friction reducing device or surface for transfers   - Consider consults to  interdisciplinary teams  - Use incontinent care products after each incontinent episode  - Consider nutrition services referral as needed  Outcome: Progressing     Problem: Prexisting or High Potential for Compromised Skin Integrity  Goal: Skin integrity is maintained or improved  Description: INTERVENTIONS:  - Identify patients at risk for skin breakdown  - Assess and monitor skin integrity  - Assess and monitor nutrition and hydration status  - Monitor labs   - Assess for incontinence   - Turn and reposition patient  - Assist with mobility/ambulation  - Relieve pressure over bony prominences  - Avoid friction and shearing  - Provide appropriate hygiene as needed including keeping skin clean and dry  - Evaluate need for skin moisturizer/barrier cream  - Collaborate with interdisciplinary team   - Patient/family teaching  - Consider wound care consult   Outcome: Progressing     Problem: Nutrition/Hydration-ADULT  Goal: Nutrient/Hydration intake appropriate for improving, restoring or maintaining nutritional needs  Description: Monitor and assess patient's nutrition/hydration status for malnutrition  Collaborate with interdisciplinary team and initiate plan and interventions as ordered  Monitor patient's weight and dietary intake as ordered or per policy  Utilize nutrition screening tool and intervene as necessary  Determine patient's food preferences and provide high-protein, high-caloric foods as appropriate       INTERVENTIONS:  - Monitor oral intake, urinary output, labs, and treatment plans  - Assess nutrition and hydration status and recommend course of action  - Evaluate amount of meals eaten  - Assist patient with eating if necessary   - Allow adequate time for meals  - Recommend/ encourage appropriate diets, oral nutritional supplements, and vitamin/mineral supplements  - Order, calculate, and assess calorie counts as needed  - Recommend, monitor, and adjust tube feedings and TPN/PPN based on assessed needs  - Assess need for intravenous fluids  - Provide specific nutrition/hydration education as appropriate  - Include patient/family/caregiver in decisions related to nutrition  Outcome: Progressing

## 2022-10-14 NOTE — PROGRESS NOTES
Progress note - Palliative and Supportive Care   Andree Roberts 76 y o  female [id number]    Patient Active Problem List   Diagnosis   • Hypothyroidism   • Gastroesophageal reflux disease without esophagitis   • Depression   • Fibromyalgia, primary   • Iron deficiency   • Numbness of foot   • Dysphasia   • Epigastric pain   • COVID-19   • Acute bronchitis   • Shortness of breath   • Fibromyalgia syndrome   • Osteopenia of both forearms   • Nasal congestion   • Bilateral hearing loss   • Anemia   • Dyspnea on exertion   • Generalized weakness   • Elevated LFTs   • Hyponatremia   • Abnormal CT scan   • H/O bariatric surgery - bypass   • Gastric ulcer   • Acute blood loss anemia   • NOAH (acute kidney injury) ([facility] [address] )   • Ambulatory dysfunction   • Severe protein-calorie malnutrition (HCC)   • Bilateral leg edema   • Gram-positive bacteremia   • Sepsis ([facility] )   • Sacral ulcer ([facility] )   • Diarrhea   • Fall   • Colitis presumed to be due to infection   • Acute cystitis without hematuria   • Acute kidney insufficiency   • Moderate protein-calorie malnutrition (HCC)   • Bacteremia   • Acute on chronic anemia   • Goals of care, counseling/discussion   • Acute encephalopathy   • Hypotension   • Urinary retention   • Hyperkalemia     Active issues specifically addressed today include:   · Palliative care encounter  · Goals of care discussion  · protein calorie malnutrition      Plan:  1  Symptom management -  -  Per primary team    2  Goals -  -   Neuropsych for capacity completed 10/7, patient does not appear to have medical decision making capacity  -   Patient is unclear about goals of care,  "today patient did express that she feels comfort care is the route to go" 10/13  -  Today, patient states that "she wants to do everything to get better  "   -  Family meeting with internal medicine 10/17 1400, brother Stormy Moura arranging with family        Code Status: DNAR/DNI  - Level 3   Decisional apparatus:  Patient is not competent on my exam today  If competence is lost, patient's substitute decision maker would default to adult siblings [place]  Advance Directive / Living Will / POLST:  None on file     Interval history:       The palliative care team was originally consulted  At the beginning of September when goals of care conversations were first warranted  The patient had been experiencing ongoing medical decline  Her past medical history was significant for Savage-en-Y bypass the patient has been having significant malnutrition since  Prior to this admission at [facility] line was placed and the patient was receiving TPN  The patient then experienced recurrent PICC line infections and PICC line had to be removed  Original goals of care discussions were in setting of the patient needing a PEG tube for ongoing nutrition and the patient originally seating she did not want to pursue a PEG tube  Extensive goals of conversations were had with the patient and family and originally the plan was to transition to comfort focused cares however the patient then had a change of heart and wanted to pursue a PEG tube  Patient had a PEG tube placed in the beginning of September  Despite to feeding she has had significant metabolic derangements including hyponatremia as well as hyperkalemia  She has been tolerating her tube feeds but has been noncompliant with a low-potassium diet with her pleasure feeds  Since PEG tube placement the patient has had recurrent the fever during her hospitalization  She completed a full course of antibiotics for ESBL UTI  She continues to have progressive anemia    Patient has had physical and occupational therapy ordered however she intermittently refuses to participate  Abnormally does the patient have a significant psychiatric background she also had a neuro psych evaluation completed on October 7th which states the patient does not have medical decision-making capacity       Yesterday goals of care conversations were again addressed with the patient and her family  Per the note to the patient was in agreement for comfort care yesterday however today she is very clearly expressing take continue all cares to get stronger and stay alive for as long as possible  Goals of care meeting planned for Monday with family internal medicine and patient  MEDICATIONS / ALLERGIES:     current meds:   Current Facility-Administered Medications   Medication Dose Route Frequency   • acetaminophen (TYLENOL) tablet 975 mg  975 mg Oral Q6H PRN   • ALPRAZolam (XANAX) tablet 0 25 mg  0 25 mg Oral TID PRN   • alteplase (CATHFLO) injection 2 mg  2 mg Intracatheter Once   • bisacodyl (DULCOLAX) rectal suppository 10 mg  10 mg Rectal Daily PRN   • bisacodyl (DULCOLAX) rectal suppository 10 mg  10 mg Rectal Once   • clonazePAM (KlonoPIN) tablet 3 mg  3 mg Oral HS   • folic acid (FOLVITE) tablet 1 mg  1 mg Oral Daily   • guaiFENesin (MUCINEX) 12 hr tablet 600 mg  600 mg Oral Q12H [facility]   • heparin (porcine) subcutaneous injection 5,000 Units  5,000 Units Subcutaneous Q8H [facility]   • levothyroxine tablet 125 mcg  125 mcg Oral Q24H   • metoclopramide (REGLAN) injection 10 mg  10 mg Intravenous Q6H PRN   • midodrine (PROAMATINE) tablet 10 mg  10 mg Oral TID AC   • ondansetron (ZOFRAN) injection 4 mg  4 mg Intravenous Q6H PRN   • oxyCODONE (ROXICODONE) IR tablet 5 mg  5 mg Oral Q6H   • pantoprazole (PROTONIX) EC tablet 40 mg  40 mg Oral BID AC   • polyethylene glycol (MIRALAX) packet 17 g  17 g Per PEG Tube BID   • senna (SENOKOT) tablet 17 2 mg  2 tablet Oral HS   • sodium bicarbonate tablet 650 mg  650 mg Oral BID after meals   • sodium chloride tablet 2 g  2 g Oral TID   • thiamine tablet 100 mg  100 mg Oral Daily   • traZODone (DESYREL) tablet 150 mg  150 mg Oral HS       No Known Allergies    OBJECTIVE:    Physical Exam  Physical Exam  Vitals and nursing note reviewed  Constitutional:       General: She is awake   She is not in acute distress  Appearance: She is underweight  She is ill-appearing  HENT:      Head: Normocephalic and atraumatic  Mouth/Throat:      Mouth: Mucous membranes are dry  Eyes:      Conjunctiva/sclera: Conjunctivae normal    Cardiovascular:      Rate and Rhythm: Normal rate and regular rhythm  Pulses: Normal pulses  Heart sounds: Normal heart sounds  No murmur heard  Pulmonary:      Effort: Pulmonary effort is normal  No respiratory distress or retractions  Breath sounds: Normal breath sounds  Abdominal:      Palpations: Abdomen is soft  Tenderness: There is no abdominal tenderness  Musculoskeletal:      Cervical back: Neck supple  Comments: Generally weak   Skin:     General: Skin is warm and dry  Coloration: Skin is pale  Comments: Facial scarring   Neurological:      General: No focal deficit present  Mental Status: She is alert and oriented to person, place, and time  Comments: Alert to self and place    Psychiatric:         Mood and Affect: Mood normal          Speech: Speech is tangential          Behavior: Behavior normal  Behavior is cooperative  Lab Results:   CBC: No results found for: WBC, HGB, HCT, MCV, PLT, ADJUSTEDWBC, MCH, MCHC, RDW, MPV, NRBC, CMP:   Lab Results   Component Value Date    SODIUM 134 (L) 10/13/2022    K 4 6 10/13/2022     10/13/2022    CO2 23 10/13/2022    BUN 34 (H) 10/13/2022    CREATININE 1 72 (H) 10/13/2022    CALCIUM 9 4 10/13/2022    EGFR 27 10/13/2022   , PT/PTT:No results found for: PT, PTT      Counseling / Coordination of Care    Total floor / unit time spent today 45+  minutes  Greater than 50% of total time was spent with the patient and / or family counseling and / or coordination of care   A description of the counseling / coordination of care:  Review chart, the of goals of care, collaboration with primary team, collaboration with family, discussion with patient, support serous illness

## 2022-10-14 NOTE — NUTRITION
10/14/22 1528   Recommendations/Interventions   Interventions/Recommendations Adjust diet order; Adjust EN/ PN; Obtain current weight   Intervention Comments Consider liberalizing diet to Regular; Pleasure Feeds  Potassium WNL at this time and patient with poor intake  Recommendations to Provider Secondary to continued poor po intake consider previous RD recommendations to increase cyclic EN to Nepro @ 55 ml/hr x 16 hours with 125 ml free water flush every 4 hours (1584 kcal, 71 gms pro, 1388 ml tv)  Monitor electrolytes and obtain current weight

## 2022-10-14 NOTE — ASSESSMENT & PLAN NOTE
· Renal function appears to have stabilized around creatinine of 1 5-1 7  · Creatinine up-trending today -  · Possibly new baseline per Nephrology  · Monitor BMP but pt refused labs

## 2022-10-14 NOTE — ASSESSMENT & PLAN NOTE
· Initially met criteria with fever, tachypnea, tachycardia, and leukocytosis on admission  Sepsis secondary to recurrent bacteremia and PICC line infections  · PICC line has been removed and patient had PEG tube placed for nutritional support  · Finished full course of ertapenem for ESBL UTI  · Patient with recurrent fever 10/7/22- Started empiric vancomycin whic was dced  · Discussed with infectious disease     observe off of antibiotic , cultures- sent on 10/9/22- NGTD  · Patient refused labs

## 2022-10-14 NOTE — ASSESSMENT & PLAN NOTE
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

## 2022-10-14 NOTE — ASSESSMENT & PLAN NOTE
· Evaluated by palliative care  Wants to continue medical directed treatments with limits of DNR/DNI  · Spoke with patient's brother Mio Gonsales on 10/11/22 who is power of  ( but financial power of ), We discussed goals of care  BrotherMio agrees that we need to address palliative/comfort option again given her failure to thrive  He will discuss with rest of the siblings and they all will discussed with patient  In next few days he will update us wether they  would like to have a meeting with palliative/hospice again  10/13/22 Called Davon  for f/u- no response  Called LUCIA- reji Locke- wife responded- explained to her that I had spoken with Mio Gonsales and to see if they had a family meeting  Also  patient did express that she feels comfort care is the route to go- reached out to Palliative team for follow up discussion on [address] and family meeting  10/14/22  Palliative team spoke with patient who today stated otherwise that she wants to do everything   Also Neuropsychiatry had evaluated patient and deemed no capacity, hence Palliative team has arranged a family meeting for Monday

## 2022-10-14 NOTE — PROGRESS NOTES
[facility]  Progress Note - [facility] 1945, 76 y o  female MRN: [id number]  Unit/Bed#: [facility] 323-01 Encounter: [id number]  Primary Care Provider: Wesley Daugherty MD   Date and time admitted to hospital: 8/30/2022 11:49 PM    * Sepsis [facility])  Assessment & Plan  · Initially met criteria with fever, tachypnea, tachycardia, and leukocytosis on admission  Sepsis secondary to recurrent bacteremia and PICC line infections  · PICC line has been removed and patient had PEG tube placed for nutritional support  · Finished full course of ertapenem for ESBL UTI  · Patient with recurrent fever 10/7/22- Started empiric vancomycin whic was dced  · Discussed with infectious disease  observe off of antibiotic , cultures- sent on 10/9/22- NGTD  · Patient refused labs    Bacteremia  Assessment & Plan  · Recent hx of staph epi bacteremia in 7/2022, presumed due to picc line  · Recurrence 1/2 set staph epi, 2nd set without growth  Was treated with intravenous vancomycin for presumed line infection  · S/p TAVIA (9/9) no evidence of vegetation  · Blood cultures had been negative however repeated again due to isolated fever which are negative to date  · PICC and TPN discontinued, now has PEG tube  · Monitor off abx    NOAH (acute kidney injury) ([facility] [address] )  Assessment & Plan  · Renal function appears to have stabilized around creatinine of 1 5-1 7  · Creatinine up-trending today -  · Possibly new baseline per Nephrology  · Monitor BMP but pt refused labs    H/O bariatric surgery - bypass  Assessment & Plan  · History of Savage-en-Y gastric bypass  Has had significant issues over the last 6 months or so with chronic malnutrition and an anastomotic ulcerations  When last seen by bariatric surgery, it was recommended to continue on prolonged TPN for nutritional optimization with possible revision of the Savage-en-Y in the future    · Given her recurrence PICC line infections and bacteremia, TPN has been discontinued  · Continue tube feeds for nutritional optimization  · Will need significant improvement before consideration can be given to further surgical intervention      Hyponatremia  Assessment & Plan    Nephrology following   Hyponatremia the setting of poor solute intake with gastric bypass, continue with salt tablets 2 g t i d , minimize free water flushes with tube feeds  Na- 133        Hyperkalemia  Assessment & Plan    · Nephrology following - giving calcium gluconate, D50/insulin, sodium bicarb given October 5th  · Low-potassium pleasure feed diet  · Change tube feeds to Nephro  · k normal now    Urinary retention  Assessment & Plan  · Has required intermittent catheterizations however now voiding spontaneously  · Continue retention protocol    Hypotension  Assessment & Plan  · Continue midodrine 10 mg t i d     Acute encephalopathy  Assessment & Plan  · Patient has waxing waning mental status   · Cognition and mental status issues multifactorial in nature stemming from prolonged illness, numerous hospitalizations, chronic malnutrition, and chronic/recurrent infections  · Monitor clinically  · Delirium precautions  · Neuropsych consulted for capacity eval      Goals of care, counseling/discussion  Assessment & Plan  · Evaluated by palliative care  Wants to continue medical directed treatments with limits of DNR/DNI  · Spoke with patient's brother Troy Page on 10/11/22 who is power of  ( but financial power of ), We discussed goals of care  Brother, Troy Page agrees that we need to address palliative/comfort option again given her failure to thrive  He will discuss with rest of the siblings and they all will discussed with patient  In next few days he will update us wether they  would like to have a meeting with palliative/hospice again      10/13/22 Called Davon  for f/u- no response  Called LUCIA- reji Locke- wife responded- explained to her that I had spoken with Troy Page and to see if they had a family meeting  Also  patient did express that she feels comfort care is the route to go- reached out to Palliative team for follow up discussion on [address] and family meeting  10/14/22  Palliative team spoke with patient who today stated otherwise that she wants to do everything  Also Neuropsychiatry had evaluated patient and deemed no capacity, hence Palliative team has arranged a family meeting for Monday      Acute on chronic anemia  Assessment & Plan  · 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  · S/p recent EGD in 7/22 revealing: Residual marginal ulcer of the gastrojejunostomy anastomosis with improved size  · S/p EGD 9/2 revealing: Large, cratered ulcer in the gastrojejunal anastomosis   · Status post 1 unit PRBCs this admission  · Continue PPI b i d    · Patient refused labs- explained need to monitor H/H    Moderate protein-calorie malnutrition ([address] )  Assessment & Plan  Malnutrition Findings:   Adult Malnutrition type: Chronic illness  Adult Degree of Malnutrition: Other severe protein calorie malnutrition  Malnutrition Characteristics: Weight loss, Muscle loss       360 Statement: Severe/Chronic malnutrition r/t condition, as evidenced by 4 8% wt loss x < 1 week (9/2/22: 125#, 9/5/22: 119#), and severe muscle mass depletion (temples/clavicle)  Treated with liberalized diet and nutrition supplements, possibly nutrition support pending goals of care    BMI Findings: Body mass index is 18 28 kg/m²  · Continue tube feeds    Diarrhea  Assessment & Plan  · Recent history of C diff colitis  · Treated with prophylactic dose of p o  Vancomycin while on antibiotics     Ambulatory dysfunction  Assessment & Plan  · Will need rehab on discharge      Abnormal CT scan  Assessment & Plan  · Noted on CT C/A/P 9/26  - Focal thickening of the lower portion of the endometrium    While this is not expected in a patient of this age, this appears stable from 2016 suggesting a benign etiology  If there is clinical concern, follow-up ultrasound is recommended  - Small left upper lobe nodules, one of which is new from 2022  Given the history of smoking, a follow-up chest CT is recommended in 12 months  · This was d/w sister and brother POA over phone by prior provider     Hypothyroidism  Assessment & Plan  · TSH elevated and fT4 low  · Endocrinology consulted - levothyroxine timing was adjusted to 2pm per Endocrinology recs (4 hours after tube feeds have stopped running)  · Repeat TFTs in 1 week per Endocrine          VTE Pharmacologic Prophylaxis: VTE Score: 3 Moderate Risk (Score 3-4) - Pharmacological DVT Prophylaxis Ordered: heparin  Patient Centered Rounds: I performed bedside rounds with nursing staff today  Discussions with Specialists or Other Care Team Provider: palliative        Time Spent for Care: 20 minutes  More than 50% of total time spent on counseling and coordination of care as described above  Current Length of Stay: 44 day(s)  Current Patient Status: Inpatient   Certification Statement: The patient will continue to require additional inpatient hospital stay due to [address] determination for dispo  Discharge Plan: Anticipate discharge in 48-72 hrs to TBD    Code Status: Level 3 - DNAR and DNI    Subjective:   Resting in bed, states she had just spoken to other doctors so she needed to rest   Also she needs to be cleaned up  Denies any pain or any symptoms    Objective:     Vitals:   Temp (24hrs), Av 5 °F (36 9 °C), Min:98 4 °F (36 9 °C), Max:98 5 °F (36 9 °C)    Temp:  [98 4 °F (36 9 °C)-98 5 °F (36 9 °C)] 98 4 °F (36 9 °C)  HR:  [76-97] 97  Resp:  [18] 18  BP: (102-129)/(57-66) 129/66  SpO2:  [95 %] 95 %  Body mass index is 18 28 kg/m²  Input and Output Summary (last 24 hours):   No intake or output data in the 24 hours ending 10/14/22 5421    Physical Exam:   Physical Exam  Vitals and nursing note reviewed     Constitutional:       General: She is not in acute distress  Appearance: She is well-developed  Comments: Elderly, frial   HENT:      Head: Normocephalic and atraumatic  Mouth/Throat:      Mouth: Mucous membranes are moist    Eyes:      Conjunctiva/sclera: Conjunctivae normal    Cardiovascular:      Rate and Rhythm: Normal rate and regular rhythm  Heart sounds: No murmur heard  Pulmonary:      Effort: Pulmonary effort is normal  No respiratory distress  Breath sounds: Normal breath sounds  Abdominal:      Palpations: Abdomen is soft  Tenderness: There is no abdominal tenderness  Comments: Peg in place   Musculoskeletal:         General: Normal range of motion  Cervical back: Neck supple  Skin:     General: Skin is warm and dry  Neurological:      General: No focal deficit present  Mental Status: She is alert  Mental status is at baseline  Additional Data:     Labs:  Results from last 7 days   Lab Units 10/12/22  1220   WBC Thousand/uL 10 17*   HEMOGLOBIN g/dL 7 4*   HEMATOCRIT % 23 2*   PLATELETS Thousands/uL 504*   NEUTROS PCT % 62   LYMPHS PCT % 14   MONOS PCT % 8   EOS PCT % 15*     Results from last 7 days   Lab Units 10/13/22  1717   SODIUM mmol/L 134*   POTASSIUM mmol/L 4 6   CHLORIDE mmol/L 103   CO2 mmol/L 23   BUN mg/dL 34*   CREATININE mg/dL 1 72*   ANION GAP mmol/L 8   CALCIUM mg/dL 9 4   GLUCOSE RANDOM mg/dL 81         Results from last 7 days   Lab Units 10/14/22  0625 10/14/22  0031 10/13/22  2051 10/13/22  1645 10/13/22  1135 10/13/22  0541 10/12/22  0558 10/11/22  2351 10/11/22  1610 10/11/22  1239 10/11/22  0640 10/11/22  0046   POC GLUCOSE mg/dl 100 118 120 91 103 104 91 95 98 117 105 94               Lines/Drains:  Invasive Devices  Report    Peripheral Intravenous Line  Duration           Peripheral IV 10/08/22 Dorsal (posterior); Right Hand 6 days          Drain  Duration           Gastrostomy/Enterostomy Percutaneous Endoscopic Gastrostomy (PEG) 20 Fr  LUQ 36 days Imaging: Reviewed radiology reports from this admission including: CT head    Recent Cultures (last 7 days):   Results from last 7 days   Lab Units 10/09/22  1418 10/09/22  1417   BLOOD CULTURE  No Growth After 5 Days  No Growth After 5 Days  Last 24 Hours Medication List:   Current Facility-Administered Medications   Medication Dose Route Frequency Provider Last Rate   • acetaminophen  975 mg Oral Q6H PRN Aries Burns PA-C     • ALPRAZolam  0 25 mg Oral TID PRN Matthew Ruiz DO     • alteplase  2 mg Intracatheter Once Jenaro Melo PA-C     • bisacodyl  10 mg Rectal Daily PRN Remberto Dangelo MD     • bisacodyl  10 mg Rectal Once Bernie Naylor MD     • clonazePAM  3 mg Oral HS Jovitakash Mcdermott, DO     • folic acid  1 mg Oral Daily Jovitakash Mcdermott, DO     • guaiFENesin  600 mg Oral Q12H [address] Aries Burns PA-C     • heparin (porcine)  5,000 Units Subcutaneous [facility] Matthew Ruiz DO     • levothyroxine  125 mcg Oral Q24H [place] Kathie Campbell MD     • metoclopramide  10 mg Intravenous Q6H PRN Remberto Dangelo MD     • midodrine  10 mg Oral TID AC TOLU Orellana     • ondansetron  4 mg Intravenous Q6H PRN Jimenez Oliver DO     • oxyCODONE  5 mg Oral Q6H Remberto Dangelo MD     • pantoprazole  40 mg Oral BID AC Kaylan Arroyo DO     • polyethylene glycol  17 g Per PEG Tube BID Jimenez Oliver DO     • senna  2 tablet Oral HS Stewart Moya MD     • sodium bicarbonate  650 mg Oral BID after meals TOLU Benz     • sodium chloride  2 g Oral TID Peg Singh DO     • thiamine  100 mg Oral Daily Jovitakash Mcdermott DO     • traZODone  150 mg Oral HS Jimenez Oliver DO          Today, Patient Was Seen By: Bernie Naylor    **Please Note: This note may have been constructed using a voice recognition system  **

## 2022-10-14 NOTE — PROGRESS NOTES
NEPHROLOGY PROGRESS NOTE   Raymond Roland 76 y o  female MRN: [id number]  Unit/Bed#: [facility] 323-01 Encounter: [id number]      ASSESSMENT & PLAN:  1  Elevated creatinine in the setting of recent sepsis with UTI  Baseline creatinine around 0 8-1 2, now kidney function around 1 5-1 6  Last creatinine 1 72 on 10/13, likely new baseline, noted her creatinine is been around 1 7 since 10/09  Recommend to follow daily labs, will defer to primary team  Avoid relative hypotension  Avoid nephrotoxins  2  Hyponatremia the setting of poor solute intake with gastric bypass, continue with salt tablets 2 g t i d , minimize free water flushes with tube feeds  Serum sodium improving to 134 yesterday    3  Hyperkalemia, improved, follow low-potassium diet, follow daily labs  Last potassium normalized at 4 6 yesterday on 10/13  Recommend to follow daily labs, will defer to primary team    4  Hypotension, blood pressure in the lower side but acceptable, continue with midodrine 10 mg t i d  5  Sepsis, completed antibiotics for UTI  Infection Disease on board, currently monitoring off antibiotics    6  Malnutrition, continue with tube feeds    7  Recent C diff, completed C diff prophylaxis    8  Anemia with recent GI bleeding, monitor H&H, noted hemoglobin dropped to 7 4 on 10/12, recommend to follow daily CBCs, will defer to primary team        SUBJECTIVE:  Patient seen and examined, no feeling well today because of pain, complains of left-sided abdominal pain, back pain, some on and off headaches  Denies any chest pain, no shortness of breath, no nausea, no vomiting  Reports poor appetite      OBJECTIVE:  Current Weight: Weight - Scale:  (Unable to weigh Pt   Bed not accessible and Pt too weak to get OOB )  Vitals:    10/13/22 2353   BP: 102/57   Pulse: 76   Resp:    Temp: 98 5 °F (36 9 °C)   SpO2: 95%     No intake or output data in the 24 hours ending 10/14/22 1127    General:  Chronically ill, cachectic, cooperative, in not acute distress  Eyes: conjunctivae pale, anicteric sclerae  ENT: lips and mucous membranes moist  Neck: supple, no JVD  Chest: clear breath sounds bilateral, no crackles, ronchus or wheezings  CVS: distinct S1 & S2, normal rate, regular rhythm  Abdomen: soft, non-tender, non-distended, normoactive bowel sounds, peg tube in place  Extremities: no edema of both legs  Skin: no rash  Neuro: awake, alert, interactive        Medications:    Current Facility-Administered Medications:   •  acetaminophen (TYLENOL) tablet 975 mg, 975 mg, Oral, Q6H PRN, Neyda Denise PA-C, 975 mg at 09/26/22 2315  •  ALPRAZolam Amye Moros) tablet 0 25 mg, 0 25 mg, Oral, TID PRN, Kt Chen DO, 0 25 mg at 09/24/22 1616  •  alteplase (CATHFLO) injection 2 mg, 2 mg, Intracatheter, Once, Larry Sevilla PA-C  •  bisacodyl (DULCOLAX) rectal suppository 10 mg, 10 mg, Rectal, Daily PRN, Guzman Ruiz MD  •  bisacodyl (DULCOLAX) rectal suppository 10 mg, 10 mg, Rectal, Once, Leticia Marie MD  •  clonazePAM (KlonoPIN) tablet 3 mg, 3 mg, Oral, HS, Jovita Mcdermott DO, 3 mg at 02/97/95 1990  •  folic acid (FOLVITE) tablet 1 mg, 1 mg, Oral, Daily, Jovita Mcdermott DO, 1 mg at 10/14/22 1008  •  guaiFENesin (MUCINEX) 12 hr tablet 600 mg, 600 mg, Oral, Q12H [facility], Aurelia Encinas PA-C, 600 mg at 10/14/22 1008  •  heparin (porcine) subcutaneous injection 5,000 Units, 5,000 Units, Subcutaneous, Q8H [facility], Kt Chen DO, 5,000 Units at 10/14/22 0630  •  levothyroxine tablet 125 mcg, 125 mcg, Oral, Q24H, Oluwatomisin Mily Ramires MD, 125 mcg at 10/12/22 1639  •  metoclopramide (REGLAN) injection 10 mg, 10 mg, Intravenous, Q6H PRN, Guzman Ruiz MD  •  midodrine (PROAMATINE) tablet 10 mg, 10 mg, Oral, TID ACJud CRNP, 10 mg at 10/14/22 1009  •  ondansetron (ZOFRAN) injection 4 mg, 4 mg, Intravenous, Q6H PRN, Jovita Mcdermott DO, 4 mg at 09/18/22 1339  •  oxyCODONE (ROXICODONE) IR tablet 5 mg, 5 mg, Oral, Q6H, Juan Coleman Kumari MD, 5 mg at 10/14/22 1009  •  pantoprazole (PROTONIX) EC tablet 40 mg, 40 mg, Oral, BID AC, Kaylan Arroyo, DO, 40 mg at 10/14/22 1009  •  polyethylene glycol (MIRALAX) packet 17 g, 17 g, Per PEG Tube, BID, Jovita Mcdermott, DO, 17 g at 10/14/22 0038  •  senna (SENOKOT) tablet 17 2 mg, 2 tablet, Oral, HS, Lucinda Pérez MD, 17 2 mg at 10/14/22 0030  •  sodium bicarbonate tablet 650 mg, 650 mg, Oral, BID after meals, TOLU Willoughby, 650 mg at 10/14/22 1010  •  sodium chloride tablet 2 g, 2 g, Oral, TID, Dylon Landis, DO, 2 g at 10/14/22 1010  •  thiamine tablet 100 mg, 100 mg, Oral, Daily, Jovita Mcdermott, DO, 100 mg at 10/14/22 1008  •  traZODone (DESYREL) tablet 150 mg, 150 mg, Oral, HS, Jovita Mcdermott, DO, 150 mg at 10/14/22 0030    Invasive Devices:        Lab Results:   Results from last 7 days   Lab Units 10/13/22  1717 10/12/22  1220 10/10/22  0500 10/09/22  1416   WBC Thousand/uL  --  10 17* 14 78* 13 79*   HEMOGLOBIN g/dL  --  7 4* 8 2* 8 3*   HEMATOCRIT %  --  23 2* 26 3* 26 6*   PLATELETS Thousands/uL  --  504* 372 652*   SODIUM mmol/L 134* 133* 130* 131*   POTASSIUM mmol/L 4 6 4 2 5 0 5 5*   CHLORIDE mmol/L 103 103 100 101   CO2 mmol/L 23 23 25 23   BUN mg/dL 34* 35* 33* 31*   CREATININE mg/dL 1 72* 1 74* 1 74* 1 71*   CALCIUM mg/dL 9 4 9 1 8 9 8 8       Previous work up:  See previous notes      Portions of the record may have been created with voice recognition software  Occasional wrong word or "sound a like" substitutions may have occurred due to the inherent limitations of voice recognition software  Read the chart carefully and recognize, using context, where substitutions have occurred  If you have any questions, please contact the dictating provider

## 2022-10-15 NOTE — ASSESSMENT & PLAN NOTE
Nephrology following   Hyponatremia the setting of poor solute intake with gastric bypass, continue with salt tablets 2 g t i d , minimize free water flushes with tube feeds

## 2022-10-15 NOTE — PROGRESS NOTES
NEPHROLOGY PROGRESS NOTE   Jaquelin Rhodes 76 y o  female MRN: [id number]  Unit/Bed#: [facility] 323-01 Encounter: [id number]      ASSESSMENT & PLAN:  1  Elevated creatinine in the setting of recent sepsis with UTI  Baseline creatinine around 0 8-1 2  Noted creatinine in the 1 5-1 7 range for several days suspected new baseline  Creatinine today up to 1 8  Recommend to follow daily labs, reported some time patient is refusing labs  Avoid relative hypotension  Avoid nephrotoxins  2  Hyponatremia the setting of poor solute intake with gastric bypass, continue with salt tablets 2 g t i d , minimize free water flushes with tube feeds  Serum sodium stable at 133 today    3  Hyperkalemia, improved, follow low-potassium diet, follow daily labs  Last potassium normalized at 4 3 today  Recommend to follow daily labs, will defer to primary team    4  Hypotension, blood pressure in the lower side but acceptable, continue with midodrine 10 mg t i d  5  Sepsis, completed antibiotics for UTI  Infection Disease on board, currently monitoring off antibiotics    6  Malnutrition, continue with tube feeds    7  Recent C diff, completed C diff prophylaxis    8  Anemia with recent GI bleeding, monitor H&H, hemoglobin low but stable with a hemoglobin of 7 9 today        SUBJECTIVE:  Patient seen and examined, feeling okay, denies any complaints other than on and off abdominal pain, denies any chest pain, no shortness of breath, no nausea, no vomiting      OBJECTIVE:  Current Weight: Weight - Scale:  (Unable to weigh Pt   Bed not accessible and Pt too weak to get OOB )  Vitals:    10/15/22 0700   BP: 108/55   Pulse: 91   Resp: 16   Temp: 99 °F (37 2 °C)   SpO2: 93%     No intake or output data in the 24 hours ending 10/15/22 1439    General:  Chronically ill, cachectic, cooperative, in not acute distress  Eyes: conjunctivae pale, anicteric sclerae  ENT: lips and mucous membranes moist, on room air  Neck: supple, no JVD  Chest: clear breath sounds bilateral, no crackles, ronchus or wheezings  CVS: distinct S1 & S2, normal rate, regular rhythm  Abdomen: soft, non-tender, non-distended, normoactive bowel sounds, peg tube in place  Extremities: no edema of both legs  Skin: no rash  Neuro: awake, alert, interactive        Medications:    Current Facility-Administered Medications:   •  acetaminophen (TYLENOL) tablet 975 mg, 975 mg, Oral, Q6H PRN, Juan Dodson PA-C, 975 mg at 09/26/22 2315  •  ALPRAZolam [place] Rocha) tablet 0 25 mg, 0 25 mg, Oral, TID PRN, Marck Biggs, DO, 0 25 mg at 09/24/22 1616  •  alteplase (CATHFLO) injection 2 mg, 2 mg, Intracatheter, Once, Larry Sevilla PA-C  •  bisacodyl (DULCOLAX) rectal suppository 10 mg, 10 mg, Rectal, Daily PRN, Wero Roberts MD  •  bisacodyl (DULCOLAX) rectal suppository 10 mg, 10 mg, Rectal, Once, Radha Martines MD  •  clonazePAM (KlonoPIN) tablet 3 mg, 3 mg, Oral, HS, Jovita Mcdermott, DO, 3 mg at 61/49/10 5073  •  folic acid (FOLVITE) tablet 1 mg, 1 mg, Oral, Daily, Jovita Mcdermott, DO, 1 mg at 10/15/22 1004  •  guaiFENesin (MUCINEX) 12 hr tablet 600 mg, 600 mg, Oral, Q12H [address] Aurelia Brito PA-C, 600 mg at 10/15/22 1004  •  heparin (porcine) subcutaneous injection 5,000 Units, 5,000 Units, Subcutaneous, Q8H [address], Marck Biggs, DO, 5,000 Units at 10/15/22 6292  •  levothyroxine tablet 125 mcg, 125 mcg, Oral, Q24H, Oluwatomisin Jie Cuello MD, 125 mcg at 10/14/22 1653  •  metoclopramide (REGLAN) injection 10 mg, 10 mg, Intravenous, Q6H PRN, Wero Roberts MD  •  midodrine (PROAMATINE) tablet 10 mg, 10 mg, Oral, TID AC, TOLU Orellana, 10 mg at 10/15/22 0516  •  ondansetron (ZOFRAN) injection 4 mg, 4 mg, Intravenous, Q6H PRN, Jovita Mcdermott DO, 4 mg at 09/18/22 1339  •  oxyCODONE (ROXICODONE) IR tablet 5 mg, 5 mg, Oral, Q6H, Wero Roberts MD, 5 mg at 10/15/22 1005  •  pantoprazole (PROTONIX) EC tablet 40 mg, 40 mg, Oral, BID AC, Kaylan Arroyo DO, 40 mg at 10/15/22 0552  •  polyethylene glycol (MIRALAX) packet 17 g, 17 g, Per PEG Tube, BID, Jovita Mcdermott, DO, 17 g at 10/14/22 2211  •  senna (SENOKOT) tablet 17 2 mg, 2 tablet, Oral, HS, Yen Jeff MD, 17 2 mg at 10/14/22 2211  •  sodium bicarbonate tablet 650 mg, 650 mg, Oral, BID after meals, Catarino Dinning, PINGNP, 650 mg at 10/15/22 1004  •  sodium chloride tablet 2 g, 2 g, Oral, TID, Wakeeney Bombard, DO, 2 g at 10/15/22 1004  •  thiamine tablet 100 mg, 100 mg, Oral, Daily, Jovita Mcdermott, DO, 100 mg at 10/15/22 1004  •  traZODone (DESYREL) tablet 150 mg, 150 mg, Oral, HS, Jovita Mcdermott, DO, 150 mg at 10/14/22 2211    Invasive Devices:        Lab Results:   Results from last 7 days   Lab Units 10/15/22  1049 10/13/22  1717 10/12/22  1220 10/10/22  0500   WBC Thousand/uL 12 14*  --  10 17* 14 78*   HEMOGLOBIN g/dL 7 9*  --  7 4* 8 2*   HEMATOCRIT % 26 4*  --  23 2* 26 3*   PLATELETS Thousands/uL 488*  --  504* 372   SODIUM mmol/L 133* 134* 133* 130*   POTASSIUM mmol/L 4 3 4 6 4 2 5 0   CHLORIDE mmol/L 102 103 103 100   CO2 mmol/L 26 23 23 25   BUN mg/dL 35* 34* 35* 33*   CREATININE mg/dL 1 80* 1 72* 1 74* 1 74*   CALCIUM mg/dL 9 3 9 4 9 1 8 9       Previous work up:  See previous notes      Portions of the record may have been created with voice recognition software  Occasional wrong word or "sound a like" substitutions may have occurred due to the inherent limitations of voice recognition software  Read the chart carefully and recognize, using context, where substitutions have occurred  If you have any questions, please contact the dictating provider

## 2022-10-15 NOTE — PLAN OF CARE
Problem: Potential for Falls  Goal: Patient will remain free of falls  Description: INTERVENTIONS:  - Educate patient/family on patient safety including physical limitations  - Instruct patient to call for assistance with activity   - Consult OT/PT to assist with strengthening/mobility   - Keep Call bell within reach  - Keep bed low and locked with side rails adjusted as appropriate  - Keep care items and personal belongings within reach  - Initiate and maintain comfort rounds  - Make Fall Risk Sign visible to staff  - Apply yellow socks and bracelet for high fall risk patients  - Consider moving patient to room near nurses station  Outcome: Progressing     Problem: MOBILITY - ADULT  Goal: Maintain or return to baseline ADL function  Description: INTERVENTIONS:  -  Assess patient's ability to carry out ADLs; assess patient's baseline for ADL function and identify physical deficits which impact ability to perform ADLs (bathing, care of mouth/teeth, toileting, grooming, dressing, etc )  - Assess/evaluate cause of self-care deficits   - Assess range of motion  - Assess patient's mobility; develop plan if impaired  - Assess patient's need for assistive devices and provide as appropriate  - Encourage maximum independence but intervene and supervise when necessary  - Involve family in performance of ADLs  - Assess for home care needs following discharge   - Consider OT consult to assist with ADL evaluation and planning for discharge  - Provide patient education as appropriate  Outcome: Progressing  Goal: Maintains/Returns to pre admission functional level  Description: INTERVENTIONS:  - Perform BMAT or MOVE assessment daily    - Set and communicate daily mobility goal to care team and patient/family/caregiver     - Collaborate with rehabilitation services on mobility goals if consulted  - Out of bed for toileting  - Record patient progress and toleration of activity level   Outcome: Progressing     Problem: INFECTION - ADULT  Goal: Absence or prevention of progression during hospitalization  Description: INTERVENTIONS:  - Assess and monitor for signs and symptoms of infection  - Monitor lab/diagnostic results  - Monitor all insertion sites, i e  indwelling lines, tubes, and drains  - Monitor endotracheal if appropriate and nasal secretions for changes in amount and color  - [place] appropriate cooling/warming therapies per order  - Administer medications as ordered  - Instruct and encourage patient and family to use good hand hygiene technique  - Identify and instruct in appropriate isolation precautions for identified infection/condition  Outcome: Progressing  Goal: Absence of fever/infection during neutropenic period  Description: INTERVENTIONS:  - Monitor WBC    Outcome: Progressing     Problem: SAFETY ADULT  Goal: Patient will remain free of falls  Description: INTERVENTIONS:  - Educate patient/family on patient safety including physical limitations  - Instruct patient to call for assistance with activity   - Consult OT/PT to assist with strengthening/mobility   - Keep Call bell within reach  - Keep bed low and locked with side rails adjusted as appropriate  - Keep care items and personal belongings within reach  - Initiate and maintain comfort rounds  - Make Fall Risk Sign visible to staff  - Apply yellow socks and bracelet for high fall risk patients  - Consider moving patient to room near nurses station  Outcome: Progressing  Goal: Maintain or return to baseline ADL function  Description: INTERVENTIONS:  -  Assess patient's ability to carry out ADLs; assess patient's baseline for ADL function and identify physical deficits which impact ability to perform ADLs (bathing, care of mouth/teeth, toileting, grooming, dressing, etc )  - Assess/evaluate cause of self-care deficits   - Assess range of motion  - Assess patient's mobility; develop plan if impaired  - Assess patient's need for assistive devices and provide as appropriate  - Encourage maximum independence but intervene and supervise when necessary  - Involve family in performance of ADLs  - Assess for home care needs following discharge   - Consider OT consult to assist with ADL evaluation and planning for discharge  - Provide patient education as appropriate  Outcome: Progressing  Goal: Maintains/Returns to pre admission functional level  Description: INTERVENTIONS:  - Perform BMAT or MOVE assessment daily    - Set and communicate daily mobility goal to care team and patient/family/caregiver  - Collaborate with rehabilitation services on mobility goals if consulted  - Out of bed for toileting  - Record patient progress and toleration of activity level   Outcome: Progressing     Problem: DISCHARGE PLANNING  Goal: Discharge to home or other facility with appropriate resources  Description: INTERVENTIONS:  - Identify barriers to discharge w/patient and caregiver  - Arrange for needed discharge resources and transportation as appropriate  - Identify discharge learning needs (meds, wound care, etc )  - Arrange for interpretive services to assist at discharge as needed  - Refer to Case Management Department for coordinating discharge planning if the patient needs post-hospital services based on physician/advanced practitioner order or complex needs related to functional status, cognitive ability, or social support system  Outcome: Progressing     Problem: Knowledge Deficit  Goal: Patient/family/caregiver demonstrates understanding of disease process, treatment plan, medications, and discharge instructions  Description: Complete learning assessment and assess knowledge base    Interventions:  - Provide teaching at level of understanding  - Provide teaching via preferred learning methods  Outcome: Progressing     Problem: SKIN/TISSUE INTEGRITY - ADULT  Goal: Skin Integrity remains intact(Skin Breakdown Prevention)  Description: Assess:  -Inspect skin when repositioning, toileting, and assisting with ADLS  -Assess extremities for adequate circulation and sensation     Bed Management:  -Have minimal linens on bed & keep smooth, unwrinkled  -Change linens as needed when moist or perspiring      Toileting:  -Offer bedside commode      Activity:  -Encourage activity and walks on unit  -Encourage or provide ROM exercises   -Use appropriate equipment to lift or move patient in bed      Skin Care:  -Avoid use of baby powder, tape, friction and shearing, hot water or constrictive clothing  -Do not massage red bony areas    Outcome: Progressing  Goal: Incision(s), wounds(s) or drain site(s) healing without S/S of infection  Description: INTERVENTIONS  - Assess and document dressing, incision, wound bed, drain sites and surrounding tissue  - Provide patient and family education    Outcome: Progressing  Goal: Pressure injury heals and does not worsen  Description: Interventions:  - Implement low air loss mattress or specialty surface (Criteria met)  - Apply silicone foam dressing  - Apply fecal or urinary incontinence containment device   - Utilize friction reducing device or surface for transfers   - Consider nutrition services referral as needed  Outcome: Progressing     Problem: Prexisting or High Potential for Compromised Skin Integrity  Goal: Skin integrity is maintained or improved  Description: INTERVENTIONS:  - Identify patients at risk for skin breakdown  - Assess and monitor skin integrity  - Assess and monitor nutrition and hydration status  - Monitor labs   - Assess for incontinence   - Turn and reposition patient  - Assist with mobility/ambulation  - Relieve pressure over bony prominences  - Avoid friction and shearing  - Provide appropriate hygiene as needed including keeping skin clean and dry  - Evaluate need for skin moisturizer/barrier cream  - Collaborate with interdisciplinary team   - Patient/family teaching  - Consider wound care consult   Outcome: Progressing Problem: Nutrition/Hydration-ADULT  Goal: Nutrient/Hydration intake appropriate for improving, restoring or maintaining nutritional needs  Description: Monitor and assess patient's nutrition/hydration status for malnutrition  Collaborate with interdisciplinary team and initiate plan and interventions as ordered  Monitor patient's weight and dietary intake as ordered or per policy  Utilize nutrition screening tool and intervene as necessary  Determine patient's food preferences and provide high-protein, high-caloric foods as appropriate       INTERVENTIONS:  - Monitor oral intake, urinary output, labs, and treatment plans  - Assess nutrition and hydration status and recommend course of action  - Evaluate amount of meals eaten  - Assist patient with eating if necessary   - Allow adequate time for meals  - Recommend/ encourage appropriate diets, oral nutritional supplements, and vitamin/mineral supplements  - Order, calculate, and assess calorie counts as needed  - Recommend, monitor, and adjust tube feedings and TPN/PPN based on assessed needs  - Assess need for intravenous fluids  - Provide specific nutrition/hydration education as appropriate  - Include patient/family/caregiver in decisions related to nutrition  Outcome: Progressing

## 2022-10-16 NOTE — PLAN OF CARE
Problem: INFECTION - ADULT  Goal: Absence or prevention of progression during hospitalization  Description: INTERVENTIONS:  - Assess and monitor for signs and symptoms of infection  - Monitor lab/diagnostic results  - Monitor all insertion sites, i e  indwelling lines, tubes, and drains  - Monitor endotracheal if appropriate and nasal secretions for changes in amount and color  - [place] appropriate cooling/warming therapies per order  - Administer medications as ordered  - Instruct and encourage patient and family to use good hand hygiene technique  - Identify and instruct in appropriate isolation precautions for identified infection/condition  Outcome: Progressing

## 2022-10-16 NOTE — ASSESSMENT & PLAN NOTE
· Initially met criteria with fever, tachypnea, tachycardia, and leukocytosis on admission  Sepsis secondary to recurrent bacteremia and PICC line infections  · PICC line has been removed and patient had PEG tube placed for nutritional support  · Finished full course of ertapenem for ESBL UTI  · Patient with recurrent fever 10/7/22- Started empiric vancomycin whic was dced  · Discussed with infectious disease     observe off of antibiotic , cultures- sent on 10/9/22- NGTD  · Slight up trending in leukocytosis, will continue to monitor with daily lab work

## 2022-10-16 NOTE — ASSESSMENT & PLAN NOTE
· Evaluated by palliative care  Wants to continue medical directed treatments with limits of DNR/DNI  · Spoke with patient's brother Yuni Staples on 10/11/22 who is power of  ( but financial power of ), We discussed goals of care  Brother, Yuni Staples agrees that we need to address palliative/comfort option again given her failure to thrive  He will discuss with rest of the siblings and they all will discussed with patient  In next few days he will update us wether they  would like to have a meeting with palliative/hospice again  10/13/22 Called [facility]  for f/u- no response  Called LUCIA- reji Cornelia- wife responded- explained to her that I had spoken with Yuni Staples and to see if they had a family meeting  Also  patient did express that she feels comfort care is the route to go- reached out to Palliative team for follow up discussion on [address] and family meeting  10/14/22  Palliative team spoke with patient who today stated otherwise that she wants to do everything   Also Neuropsychiatry had evaluated patient and deemed no capacity, hence Palliative team has arranged a family meeting for Monday

## 2022-10-16 NOTE — PROGRESS NOTES
[address] [facility]  Progress Note - Magi Rockwell 1945, 76 y o  female MRN: [id number]  Unit/Bed#: [facility] 323-01 Encounter: [id number]  Primary Care Provider: Carmen Villegas MD   Date and time admitted to hospital: 8/30/2022 11:49 PM    Hyperkalemia  Assessment & Plan    · Nephrology following - giving calcium gluconate, D50/insulin, sodium bicarb given October 5th  · Low-potassium pleasure feed diet  · Change tube feeds to Nephro  · k normal now    Urinary retention  Assessment & Plan  · Has required intermittent catheterizations however now voiding spontaneously  · Continue retention protocol    Hypotension  Assessment & Plan  · Continue midodrine 10 mg t i d     Acute encephalopathy  Assessment & Plan  · Patient has waxing waning mental status   · Cognition and mental status issues multifactorial in nature stemming from prolonged illness, numerous hospitalizations, chronic malnutrition, and chronic/recurrent infections  · Monitor clinically  · Delirium precautions  · Neuropsych consulted for capacity eval-patient does not have capacity per neuropsych      Goals of care, counseling/discussion  Assessment & Plan  · Evaluated by palliative care  Wants to continue medical directed treatments with limits of DNR/DNI  · Spoke with patient's brother Brandon Wells on 10/11/22 who is power of  ( but financial power of ), We discussed goals of care  Brother, Brandon Wells agrees that we need to address palliative/comfort option again given her failure to thrive  He will discuss with rest of the siblings and they all will discussed with patient  In next few days he will update us wether they  would like to have a meeting with palliative/hospice again  10/13/22 Called [facility]on  for f/u- no response  Called LUCIA- rejibeata Ricow- wife responded- explained to her that I had spoken with Brandon Wells and to see if they had a family meeting    Also  patient did express that she feels comfort care is the route to go- reached out to Palliative team for follow up discussion on [address] and family meeting  10/14/22  Palliative team spoke with patient who today stated otherwise that she wants to do everything  Also Neuropsychiatry had evaluated patient and deemed no capacity, hence Palliative team has arranged a family meeting for Monday      Acute on chronic anemia  Assessment & Plan  · 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  · S/p recent EGD in 7/22 revealing: Residual marginal ulcer of the gastrojejunostomy anastomosis with improved size  · S/p EGD 9/2 revealing: Large, cratered ulcer in the gastrojejunal anastomosis   · Status post 1 unit PRBCs this admission  · Continue PPI b i d    ·     Bacteremia  Assessment & Plan  · Recent hx of staph epi bacteremia in 7/2022, presumed due to picc line  · Recurrence 1/2 set staph epi, 2nd set without growth  Was treated with intravenous vancomycin for presumed line infection  · S/p TAVIA (9/9) no evidence of vegetation  · Blood cultures had been negative however repeated again due to isolated fever which are negative to date  · PICC and TPN discontinued, now has PEG tube  · Monitor off abx-monitoring for fevers, up trending leukocytosis    Moderate protein-calorie malnutrition (HCC)  Assessment & Plan  Malnutrition Findings:   Adult Malnutrition type: Chronic illness  Adult Degree of Malnutrition: Other severe protein calorie malnutrition  Malnutrition Characteristics: Weight loss, Muscle loss       360 Statement: Severe/Chronic malnutrition r/t condition, as evidenced by 4 8% wt loss x < 1 week (9/2/22: 125#, 9/5/22: 119#), and severe muscle mass depletion (temples/clavicle)  Treated with liberalized diet and nutrition supplements, possibly nutrition support pending goals of care    BMI Findings: Body mass index is 18 28 kg/m²     · Continue tube feeds    Fall  Assessment & Plan  Today on 10/11/22 afternoon and rapid response was called for fall  -as per RR note and per patient, she was leaning forward , getting gou tof the bed  to grab a pillow, her leg got caught  and she fell  -denies head strike, loss of consciousness, pain, any lightheadaness, diaphoresis before or after fall  -patient moving all extremities after  Fall  - vital signs stable  -patient was placed on soft C-collar and CT head as well as cervical spine was obtained stat-result showed no fracture or subluxation  -fall precaution  -d/w brother Galo Saha and updated her status/incident of fall   Also discussed regardign GOC    Diarrhea  Assessment & Plan  · Recent history of C diff colitis  · Treated with prophylactic dose of p o  Vancomycin while on antibiotics   · Patient reported some mild abdominal pain this morning but resolved     Ambulatory dysfunction  Assessment & Plan  · Will need rehab on discharge      NOAH (acute kidney injury) ([facility] [address] )  Assessment & Plan  · Renal function appears to have stabilized around creatinine of 1 5-1 7  · Morning labs pending patient initially refused but is now agreeable  · Possibly new baseline per Nephrology  ·     H/O bariatric surgery - bypass  Assessment & Plan  · History of Savage-en-Y gastric bypass  Has had significant issues over the last 6 months or so with chronic malnutrition and an anastomotic ulcerations  When last seen by bariatric surgery, it was recommended to continue on prolonged TPN for nutritional optimization with possible revision of the Savage-en-Y in the future  · Given her recurrence PICC line infections and bacteremia, TPN has been discontinued  · Continue tube feeds for nutritional optimization  · Will need significant improvement before consideration can be given to further surgical intervention      Abnormal CT scan  Assessment & Plan  · Noted on CT C/A/P 9/26  - Focal thickening of the lower portion of the endometrium    While this is not expected in a patient of this age, this appears stable from 2016 suggesting a benign etiology  If there is clinical concern, follow-up ultrasound is recommended  - Small left upper lobe nodules, one of which is new from March 2022  Given the history of smoking, a follow-up chest CT is recommended in 12 months  · This was d/w sister and brother POA over phone by prior provider 9/28    Hyponatremia  Assessment & Plan    Nephrology following   Hyponatremia the setting of poor solute intake with gastric bypass, continue with salt tablets 2 g t i d , minimize free water flushes with tube feeds  Hypothyroidism  Assessment & Plan  · TSH elevated and fT4 low  · Endocrinology consulted - levothyroxine timing was adjusted to 2pm per Endocrinology recs (4 hours after tube feeds have stopped running)  · Repeat TFTs in 1 week per Endocrine    * Sepsis ([facility] [address] )  Assessment & Plan  · Initially met criteria with fever, tachypnea, tachycardia, and leukocytosis on admission  Sepsis secondary to recurrent bacteremia and PICC line infections  · PICC line has been removed and patient had PEG tube placed for nutritional support  · Finished full course of ertapenem for ESBL UTI  · Patient with recurrent fever 10/7/22- Started empiric vancomycin whic was dced  · Discussed with infectious disease  observe off of antibiotic , cultures- sent on 10/9/22- NGTD  · Slight up trending in leukocytosis, will continue to monitor with daily lab work    Transaminitis-resolved as of 10/2/2022  Assessment & Plan  · NO RUQ pain, USG with gallstones, no choelcystitis  · Will trend    Left upper quadrant abdominal pain-resolved as of 9/28/2022  Assessment & Plan  · Improved  Retention disc from PEG was loosened by Gastroenterology  · Also with concern for constipation on bowel regimen  · CT negative for acute findings          VTE Pharmacologic Prophylaxis: VTE Score: 3 Moderate Risk (Score 3-4) - Pharmacological DVT Prophylaxis Ordered: heparin      Patient Centered Rounds: I performed bedside rounds with nursing staff today   Discussions with Specialists or Other Care Team Provider: n/a    Education and Discussions with Family / Patient: Updated  (brother) via phone  Time Spent for Care: 30 minutes  More than 50% of total time spent on counseling and coordination of care as described above  Current Length of Stay: 46 day(s)  Current Patient Status: Inpatient   Certification Statement: The patient will continue to require additional inpatient hospital stay due to Goals of care meeting tomorrow family  Discharge Plan: Anticipate discharge in 48-72 hrs to Family meeting    Code Status: Level 3 - DNAR and DNI    Subjective:   Patient denies any acute complaints initially refused labs this morning but is now agreeable  She noted some abdominal discomfort yesterday but denies any further this morning or this afternoon    Objective:     Vitals:   Temp (24hrs), Av 2 °F (37 3 °C), Min:98 6 °F (37 °C), Max:99 7 °F (37 6 °C)    Temp:  [98 6 °F (37 °C)-99 7 °F (37 6 °C)] 99 7 °F (37 6 °C)  HR:  [82-97] 97  Resp:  [16-18] 16  BP: (110-111)/(55-56) 111/55  SpO2:  [94 %-96 %] 96 %  Body mass index is 18 28 kg/m²  Input and Output Summary (last 24 hours): Intake/Output Summary (Last 24 hours) at 10/16/2022 1426  Last data filed at 10/16/2022 0600  Gross per 24 hour   Intake --   Output 600 ml   Net -600 ml       Physical Exam:   Physical Exam  Vitals and nursing note reviewed  Constitutional:       General: She is not in acute distress  Appearance: She is well-developed  She is ill-appearing  She is not toxic-appearing or diaphoretic  Comments: Chronically ill-appearing   HENT:      Head: Normocephalic and atraumatic  Eyes:      General: No scleral icterus  Conjunctiva/sclera: Conjunctivae normal    Cardiovascular:      Rate and Rhythm: Normal rate and regular rhythm  Heart sounds: No murmur heard  No friction rub  No gallop     Pulmonary:      Effort: Pulmonary effort is normal  No respiratory distress  Breath sounds: Normal breath sounds  No stridor  No wheezing, rhonchi or rales  Chest:      Chest wall: No tenderness  Abdominal:      General: There is no distension  Palpations: Abdomen is soft  There is no mass  Tenderness: There is no abdominal tenderness  There is no guarding or rebound  Hernia: No hernia is present  Comments: PEG tube in place without any surrounding erythema discharge or tenderness   Musculoskeletal:         General: No swelling, tenderness, deformity or signs of injury  Cervical back: Neck supple  Skin:     General: Skin is warm and dry  Coloration: Skin is pale  Skin is not jaundiced  Neurological:      Mental Status: She is alert  Additional Data:     Labs:  Results from last 7 days   Lab Units 10/15/22  1049 10/12/22  1220   WBC Thousand/uL 12 14* 10 17*   HEMOGLOBIN g/dL 7 9* 7 4*   HEMATOCRIT % 26 4* 23 2*   PLATELETS Thousands/uL 488* 504*   NEUTROS PCT %  --  62   LYMPHS PCT %  --  14   MONOS PCT %  --  8   EOS PCT %  --  15*     Results from last 7 days   Lab Units 10/15/22  1049   SODIUM mmol/L 133*   POTASSIUM mmol/L 4 3   CHLORIDE mmol/L 102   CO2 mmol/L 26   BUN mg/dL 35*   CREATININE mg/dL 1 80*   ANION GAP mmol/L 5   CALCIUM mg/dL 9 3   GLUCOSE RANDOM mg/dL 110         Results from last 7 days   Lab Units 10/16/22  1127 10/16/22  0608 10/15/22  1821 10/15/22  1623 10/15/22  1206 10/15/22  0541 10/15/22  0042 10/14/22  1751 10/14/22  0625 10/14/22  0031 10/13/22  2051 10/13/22  1645   POC GLUCOSE mg/dl 103 113 88 115 89 96 105 91 100 118 120 91               Lines/Drains:  Invasive Devices  Report    Drain  Duration           Gastrostomy/Enterostomy Percutaneous Endoscopic Gastrostomy (PEG) 20 Fr  LUQ 37 days                      Imaging: No pertinent imaging reviewed      Recent Cultures (last 7 days):         Last 24 Hours Medication List:   Current Facility-Administered Medications   Medication Dose Route Frequency Provider Last Rate   • acetaminophen  975 mg Oral Q6H PRN Aries Burns PA-C     • ALPRAZolam  0 25 mg Oral TID PRN Matthew Ruiz DO     • alteplase  2 mg Intracatheter Once [employer] YNES Sevilla     • bisacodyl  10 mg Rectal Daily PRN Remberto Dangelo MD     • bisacodyl  10 mg Rectal Once Bernie Naylor MD     • clonazePAM  3 mg Oral HS Jovita Mcdermott, DO     • folic acid  1 mg Oral Daily Jovitakash Mcdermott, DO     • guaiFENesin  600 mg Oral Q12H [facility] Aries Burns PA-C     • heparin (porcine)  5,000 Units Subcutaneous [facility] Matthew Ruiz, DO     • levothyroxine  125 mcg Oral Q24H [place] Kathie Campbell MD     • metoclopramide  10 mg Intravenous Q6H PRN Remberto Dangelo MD     • midodrine  10 mg Oral TID AC TOLU Orellana     • ondansetron  4 mg Intravenous Q6H PRN Jimenez Oliver DO     • oxyCODONE  5 mg Oral Q6H Remberto Dangelo MD     • pantoprazole  40 mg Oral BID AC Kaylan Arroyo DO     • polyethylene glycol  17 g Per PEG Tube BID Jimenez Oliver DO     • senna  2 tablet Oral HS Stewart Moya MD     • sodium bicarbonate  650 mg Oral BID after meals TOLU Benz     • sodium chloride  2 g Oral TID Peg Singh DO     • thiamine  100 mg Oral Daily Joviat Mcdermott DO     • traZODone  150 mg Oral HS Jimenez Oliver DO          Today, Patient Was Seen By: Jean Gutiérrez    **Please Note: This note may have been constructed using a voice recognition system  **

## 2022-10-16 NOTE — QUICK NOTE
Previous labs stable kidney function and serum sodium  No labs today    Will follow patient Monday morning with repeat labs

## 2022-10-16 NOTE — ASSESSMENT & PLAN NOTE
· Patient has waxing waning mental status   · Cognition and mental status issues multifactorial in nature stemming from prolonged illness, numerous hospitalizations, chronic malnutrition, and chronic/recurrent infections    · Monitor clinically  · Delirium precautions  · Neuropsych consulted for capacity eval-patient does not have capacity per neuropsych

## 2022-10-16 NOTE — ASSESSMENT & PLAN NOTE
· Renal function appears to have stabilized around creatinine of 1 5-1 7  · Morning labs pending patient initially refused but is now agreeable  · Possibly new baseline per Nephrology  ·

## 2022-10-16 NOTE — ASSESSMENT & PLAN NOTE
Today on 10/11/22 afternoon and rapid response was called for fall  -as per RR note and per patient, she was leaning forward , getting gou tof the bed  to grab a pillow, her leg got caught  and she fell  -denies head strike, loss of consciousness, pain, any lightheadaness, diaphoresis before or after fall  -patient moving all extremities after  Fall  - vital signs stable  -patient was placed on soft C-collar and CT head as well as cervical spine was obtained stat-result showed no fracture or subluxation  -fall precaution  -d/w brother Julián Certain and updated her status/incident of fall   [facility]

## 2022-10-16 NOTE — ASSESSMENT & PLAN NOTE
· Evaluated by palliative care  Wants to continue medical directed treatments with limits of DNR/DNI  · Spoke with patient's brother Bethany Palm on 10/11/22 who is power of  ( but financial power of ), We discussed goals of care  Brother, Bethany Palm agrees that we need to address palliative/comfort option again given her failure to thrive  He will discuss with rest of the siblings and they all will discussed with patient  In next few days he will update us wether they  would like to have a meeting with palliative/hospice again  10/13/22 Called [facility]on  for f/u- no response  Called LUCIA- reji Locke- wife responded- explained to her that I had spoken with Bethany Palm and to see if they had a family meeting  Also  patient did express that she feels comfort care is the route to go- reached out to Palliative team for follow up discussion on [address] and family meeting  10/14/22  Palliative team spoke with patient who today stated otherwise that she wants to do everything   Also Neuropsychiatry had evaluated patient and deemed no capacity, hence Palliative team has arranged a family meeting for Monday

## 2022-10-16 NOTE — ASSESSMENT & PLAN NOTE
· Recent hx of staph epi bacteremia in 7/2022, presumed due to picc line  · Recurrence 1/2 set staph epi, 2nd set without growth  Was treated with intravenous vancomycin for presumed line infection  · S/p TAVIA (9/9) no evidence of vegetation  · Blood cultures had been negative however repeated again due to isolated fever which are negative to date    · PICC and TPN discontinued, now has PEG tube  · Monitor off abx-monitoring for fevers, up trending leukocytosis

## 2022-10-16 NOTE — ASSESSMENT & PLAN NOTE
· Recent history of C diff colitis  · Treated with prophylactic dose of p o   Vancomycin while on antibiotics   · Patient reported some mild abdominal pain this morning but resolved

## 2022-10-16 NOTE — ASSESSMENT & PLAN NOTE
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

## 2022-10-16 NOTE — PROGRESS NOTES
[address]  Progress Note - Sharla Alpers 1945, 76 y o  female MRN: [id number]  Unit/Bed#: [facility] 323-01 Encounter: [id number]  Primary Care Provider: Uche Fuentes MD   Date and time admitted to hospital: 8/30/2022 11:49 PM    Hyperkalemia  Assessment & Plan    · Nephrology following - giving calcium gluconate, D50/insulin, sodium bicarb given October 5th  · Low-potassium pleasure feed diet  · Change tube feeds to Nephro  · k normal now    Urinary retention  Assessment & Plan  · Has required intermittent catheterizations however now voiding spontaneously  · Continue retention protocol    Hypotension  Assessment & Plan  · Continue midodrine 10 mg t i d     Acute encephalopathy  Assessment & Plan  · Patient has waxing waning mental status   · Cognition and mental status issues multifactorial in nature stemming from prolonged illness, numerous hospitalizations, chronic malnutrition, and chronic/recurrent infections  · Monitor clinically  · Delirium precautions  · Neuropsych consulted for capacity eval-patient does not have capacity per neuropsych      Goals of care, counseling/discussion  Assessment & Plan  · Evaluated by palliative care  Wants to continue medical directed treatments with limits of DNR/DNI  · Spoke with patient's brother Lucho Langston on 10/11/22 who is power of  ( but financial power of ), We discussed goals of care  Brother, Lucho Langston agrees that we need to address palliative/comfort option again given her failure to thrive  He will discuss with rest of the siblings and they all will discussed with patient  In next few days he will update us wether they  would like to have a meeting with palliative/hospice again  10/13/22 Called [facility]  for f/u- no response  Called LUCIA- reji Locke- wife responded- explained to her that I had spoken with Lucho Langston and to see if they had a family meeting    Also  patient did express that she feels comfort care is the route to go- reached out to Palliative team for follow up discussion on [address] and family meeting  10/14/22  Palliative team spoke with patient who today stated otherwise that she wants to do everything  Also Neuropsychiatry had evaluated patient and deemed no capacity, hence Palliative team has arranged a family meeting for Monday      Acute on chronic anemia  Assessment & Plan  · 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  · S/p recent EGD in 7/22 revealing: Residual marginal ulcer of the gastrojejunostomy anastomosis with improved size  · S/p EGD 9/2 revealing: Large, cratered ulcer in the gastrojejunal anastomosis   · Status post 1 unit PRBCs this admission  · Continue PPI b i d    ·     Bacteremia  Assessment & Plan  · Recent hx of staph epi bacteremia in 7/2022, presumed due to picc line  · Recurrence 1/2 set staph epi, 2nd set without growth  Was treated with intravenous vancomycin for presumed line infection  · S/p TAVIA (9/9) no evidence of vegetation  · Blood cultures had been negative however repeated again due to isolated fever which are negative to date  · PICC and TPN discontinued, now has PEG tube  · Monitor off abx-monitoring for fevers, up trending leukocytosis    Moderate protein-calorie malnutrition (HCC)  Assessment & Plan  Malnutrition Findings:   Adult Malnutrition type: Chronic illness  Adult Degree of Malnutrition: Other severe protein calorie malnutrition  Malnutrition Characteristics: Weight loss, Muscle loss       360 Statement: Severe/Chronic malnutrition r/t condition, as evidenced by 4 8% wt loss x < 1 week (9/2/22: 125#, 9/5/22: 119#), and severe muscle mass depletion (temples/clavicle)  Treated with liberalized diet and nutrition supplements, possibly nutrition support pending goals of care    BMI Findings: Body mass index is 18 28 kg/m²     · Continue tube feeds    Fall  Assessment & Plan  Today on 10/11/22 afternoon and rapid response was called for fall  -as per RR note and per patient, she was leaning forward , getting gou tof the bed  to grab a pillow, her leg got caught  and she fell  -denies head strike, loss of consciousness, pain, any lightheadaness, diaphoresis before or after fall  -patient moving all extremities after  Fall  - vital signs stable  -patient was placed on soft C-collar and CT head as well as cervical spine was obtained stat-result showed no fracture or subluxation  -fall precaution  -d/w brother Dayna Masterson and updated her status/incident of fall   Also discussed regardign GOC    Diarrhea  Assessment & Plan  · Recent history of C diff colitis  · Treated with prophylactic dose of p o  Vancomycin while on antibiotics   · Patient reported some mild abdominal pain this morning but resolved     Ambulatory dysfunction  Assessment & Plan  · Will need rehab on discharge      NOAH (acute kidney injury) ([facility] [address] )  Assessment & Plan  · Renal function appears to have stabilized around creatinine of 1 5-1 7  · Morning labs pending patient initially refused but is now agreeable  · Possibly new baseline per Nephrology  ·     H/O bariatric surgery - bypass  Assessment & Plan  · History of Savage-en-Y gastric bypass  Has had significant issues over the last 6 months or so with chronic malnutrition and an anastomotic ulcerations  When last seen by bariatric surgery, it was recommended to continue on prolonged TPN for nutritional optimization with possible revision of the Savage-en-Y in the future  · Given her recurrence PICC line infections and bacteremia, TPN has been discontinued  · Continue tube feeds for nutritional optimization  · Will need significant improvement before consideration can be given to further surgical intervention      Abnormal CT scan  Assessment & Plan  · Noted on CT C/A/P 9/26  - Focal thickening of the lower portion of the endometrium    While this is not expected in a patient of this age, this appears stable from 2016 suggesting a benign etiology  If there is clinical concern, follow-up ultrasound is recommended  - Small left upper lobe nodules, one of which is new from March 2022  Given the history of smoking, a follow-up chest CT is recommended in 12 months  · This was d/w sister and brother POA over phone by prior provider 9/28    Hyponatremia  Assessment & Plan    Nephrology following   Hyponatremia the setting of poor solute intake with gastric bypass, continue with salt tablets 2 g t i d , minimize free water flushes with tube feeds  Hypothyroidism  Assessment & Plan  · TSH elevated and fT4 low  · Endocrinology consulted - levothyroxine timing was adjusted to 2pm per Endocrinology recs (4 hours after tube feeds have stopped running)  · Repeat TFTs in 1 week per Endocrine    * Sepsis ([facility] [address] )  Assessment & Plan  · Initially met criteria with fever, tachypnea, tachycardia, and leukocytosis on admission  Sepsis secondary to recurrent bacteremia and PICC line infections  · PICC line has been removed and patient had PEG tube placed for nutritional support  · Finished full course of ertapenem for ESBL UTI  · Patient with recurrent fever 10/7/22- Started empiric vancomycin whic was dced  · Discussed with infectious disease  observe off of antibiotic , cultures- sent on 10/9/22- NGTD  · Slight up trending in leukocytosis, will continue to monitor with daily lab work    Transaminitis-resolved as of 10/2/2022  Assessment & Plan  · NO RUQ pain, USG with gallstones, no choelcystitis  · Will trend    Left upper quadrant abdominal pain-resolved as of 9/28/2022  Assessment & Plan  · Improved  Retention disc from PEG was loosened by Gastroenterology  · Also with concern for constipation on bowel regimen  · CT negative for acute findings        VTE Pharmacologic Prophylaxis: VTE Score: 3 Moderate Risk (Score 3-4) - Pharmacological DVT Prophylaxis Ordered: heparin      Patient Centered Rounds: I performed bedside rounds with nursing staff today   Discussions with Specialists or Other Care Team Provider: n/a    Education and Discussions with Family / Patient: Updated  (brother) via phone  Time Spent for Care: 30 minutes  More than 50% of total time spent on counseling and coordination of care as described above  Current Length of Stay: 46 day(s)  Current Patient Status: Inpatient   Certification Statement: The patient will continue to require additional inpatient hospital stay due to Pending family meeting  Discharge Plan: Anticipate discharge in >72 hrs to rehab facility  Code Status: Level 3 - DNAR and DNI    Subjective:   Patient denies any complaints    Objective:     Vitals:   Temp (24hrs), Av 4 °F (37 4 °C), Min:99 1 °F (37 3 °C), Max:99 7 °F (37 6 °C)    Temp:  [99 1 °F (37 3 °C)-99 7 °F (37 6 °C)] 99 1 °F (37 3 °C)  HR:  [77-97] 77  Resp:  [16] 16  BP: (100-111)/(55) 100/55  SpO2:  [96 %] 96 %  Body mass index is 18 28 kg/m²  Input and Output Summary (last 24 hours): Intake/Output Summary (Last 24 hours) at 10/16/2022 1602  Last data filed at 10/16/2022 0600  Gross per 24 hour   Intake --   Output 600 ml   Net -600 ml       Physical Exam:   Physical Exam  Vitals and nursing note reviewed  Constitutional:       General: She is not in acute distress  Appearance: She is well-developed  She is ill-appearing  She is not toxic-appearing or diaphoretic  Comments: Chronically ill-appearing   HENT:      Head: Normocephalic and atraumatic  Eyes:      General: No scleral icterus  Conjunctiva/sclera: Conjunctivae normal    Cardiovascular:      Rate and Rhythm: Normal rate and regular rhythm  Heart sounds: No murmur heard  No friction rub  No gallop  Pulmonary:      Effort: Pulmonary effort is normal  No respiratory distress  Breath sounds: Normal breath sounds  No stridor  No wheezing, rhonchi or rales  Chest:      Chest wall: No tenderness     Abdominal:      General: There is no distension  Palpations: Abdomen is soft  There is no mass  Tenderness: There is no abdominal tenderness  There is no guarding or rebound  Hernia: No hernia is present  Comments: PEG tube in place without any surrounding erythema discharge or tenderness   Musculoskeletal:         General: No swelling, tenderness, deformity or signs of injury  Cervical back: Neck supple  Skin:     General: Skin is warm and dry  Coloration: Skin is pale  Skin is not jaundiced  Neurological:      Mental Status: She is alert  Additional Data:     Labs:  Results from last 7 days   Lab Units 10/15/22  1049 10/12/22  1220   WBC Thousand/uL 12 14* 10 17*   HEMOGLOBIN g/dL 7 9* 7 4*   HEMATOCRIT % 26 4* 23 2*   PLATELETS Thousands/uL 488* 504*   NEUTROS PCT %  --  62   LYMPHS PCT %  --  14   MONOS PCT %  --  8   EOS PCT %  --  15*     Results from last 7 days   Lab Units 10/15/22  1049   SODIUM mmol/L 133*   POTASSIUM mmol/L 4 3   CHLORIDE mmol/L 102   CO2 mmol/L 26   BUN mg/dL 35*   CREATININE mg/dL 1 80*   ANION GAP mmol/L 5   CALCIUM mg/dL 9 3   GLUCOSE RANDOM mg/dL 110         Results from last 7 days   Lab Units 10/16/22  1127 10/16/22  0608 10/15/22  1821 10/15/22  1623 10/15/22  1206 10/15/22  0541 10/15/22  0042 10/14/22  1751 10/14/22  0625 10/14/22  0031 10/13/22  2051 10/13/22  1645   POC GLUCOSE mg/dl 103 113 88 115 89 96 105 91 100 118 120 91               Lines/Drains:  Invasive Devices  Report    Drain  Duration           Gastrostomy/Enterostomy Percutaneous Endoscopic Gastrostomy (PEG) 20 Fr  LUQ 37 days                      Imaging: No pertinent imaging reviewed      Recent Cultures (last 7 days):         Last 24 Hours Medication List:   Current Facility-Administered Medications   Medication Dose Route Frequency Provider Last Rate   • acetaminophen  975 mg Oral Q6H PRN Inga Mathew PA-C     • ALPRAZolam  0 25 mg Oral TID PRN Uriel Floyd DO     • alteplase  2 mg Intracatheter Once [employer], PA-C     • bisacodyl  10 mg Rectal Daily PRN Mika Renner MD     • bisacodyl  10 mg Rectal Once Samreen Elias MD     • clonazePAM  3 mg Oral HS Jovitaaixa Mcdermott, DO     • folic acid  1 mg Oral Daily Jovitakash Mcdermott, DO     • guaiFENesin  600 mg Oral Q12H [address] Lyudmila Hopkins PA-C     • heparin (porcine)  5,000 Units Subcutaneous [facility] Yung Grace, DO     • levothyroxine  125 mcg Oral Q24H [place] Dali Sharif MD     • metoclopramide  10 mg Intravenous Q6H PRN Mika Renner MD     • midodrine  10 mg Oral TID AC TOLU Orellana     • ondansetron  4 mg Intravenous Q6H PRN Deja Barcenas DO     • oxyCODONE  5 mg Oral Q6H Mika Renner MD     • pantoprazole  40 mg Oral BID AC Kaylan Arroyo DO     • polyethylene glycol  17 g Per PEG Tube BID Deja Barcenas DO     • senna  2 tablet Oral HS Kurt Kayser, MD     • sodium bicarbonate  650 mg Oral BID after meals TOLU Valerio     • sodium chloride  2 g Oral TID Palma Reyes DO     • thiamine  100 mg Oral Daily Jovita Mcdermott DO     • traZODone  150 mg Oral HS Deja Barcenas DO          Today, Patient Was Seen By: Marga Lowery    **Please Note: This note may have been constructed using a voice recognition system  **

## 2022-10-17 PROBLEM — U07.1 COVID-19: Status: RESOLVED | Noted: 2021-03-03 | Resolved: 2022-10-17

## 2022-10-17 NOTE — ASSESSMENT & PLAN NOTE
· Initially met criteria with fever, tachypnea, tachycardia, and leukocytosis on admission  Sepsis secondary to recurrent bacteremia and PICC line infections  · PICC line has been removed and patient had PEG tube placed for nutritional support  · Finished full course of ertapenem for ESBL UTI  · Patient with recurrent fever 10/7/22- Started empiric vancomycin whic was dced  · Discussed with infectious disease     observe off of antibiotic , cultures- sent on 10/9/22- NGTD  · Will check CBC to monitor leukocytosis

## 2022-10-17 NOTE — PLAN OF CARE
Problem: OCCUPATIONAL THERAPY ADULT  Goal: Performs self-care activities at highest level of function for planned discharge setting  See evaluation for individualized goals  Description: Treatment Interventions: ADL retraining, Functional transfer training, Endurance training, UE strengthening/ROM, Cognitive reorientation, Patient/family training, Equipment evaluation/education, Compensatory technique education, Continued evaluation, Energy conservation, Activityengagement          See flowsheet documentation for full assessment, interventions and recommendations     Note: Limitation: Decreased ADL status, Decreased endurance, Decreased self-care trans, Decreased high-level ADLs  Prognosis: Fair  Assessment: Pt seen for OT session focusing on self care, functional mobility/transfers and activity tolerance - agreeable to session but did not want to sit OOB today - functionally requires min a for UB and max a  for LB adls - min to mod a for transfers 2* weakness, impaired balance and overall poor tolerance to activity - continue to recommend inpt rehab with possible need for LTC - OT to continue to follow x 30 days 1-2x/wk to address goals as stated on most recent re-eval     OT Discharge Recommendation: Post acute rehabilitation services

## 2022-10-17 NOTE — OCCUPATIONAL THERAPY NOTE
Occupational Therapy Progress Note     Patient Name: Belia Hernandez  IIZUNLIZ Date: 10/17/2022  Problem List  Principal Problem:    Sepsis [facility])  Active Problems:    Hypothyroidism    Hyponatremia    Abnormal CT scan    H/O bariatric surgery - bypass    NOAH (acute kidney injury) ([facility] [address] )    Ambulatory dysfunction    Diarrhea    Fall    Moderate protein-calorie malnutrition ([facility] [address] )    Bacteremia    Acute on chronic anemia    Goals of care, counseling/discussion    Acute encephalopathy    Hypotension    Urinary retention    Hyperkalemia        10/17/22 1130   OT Last Visit   OT Visit Date 10/17/22   Note Type   Note Type Treatment   Pain Assessment   Pain Assessment Tool 0-10   Pain Score 6   Pain Location/Orientation Location: Abdomen   Hospital Pain Intervention(s) Repositioned; Ambulation/increased activity; Emotional support;Relaxation technique   Restrictions/Precautions   Weight Bearing Precautions Per Order No   Other Precautions Cognitive; Chair Alarm; Bed Alarm;Multiple lines; Fall Risk;Pain   Lifestyle   Autonomy I adls and mobility -i iadls   Reciprocal Relationships supportive family -   Service to Others retired   [address] [facility] 5  Supervision/Setup   Grooming Assistance 4  Minimal Assistance   [address] [place] [address] 4  Minimal Assistance   LB [address] 2  Maximal [address] 4  [address] 2  Maximal Assistance   Bed Mobility   Supine to Sit 4  Minimal assistance   Transfers   Sit to Stand 3  Moderate assistance   Stand to Sit 3  Moderate assistance   Subjective   Subjective "I'm content like this, I have my bed, I have my TV"   Cognition   Overall Cognitive Status Impaired   Arousal/Participation Arousable; Cooperative   Attention Attends with cues to redirect   Orientation Level Oriented to person;Oriented to place; Disoriented to time;Disoriented to situation   Memory Decreased short term memory;Decreased recall of recent events;Decreased recall of precautions   Following Commands Follows one step commands with increased time or repetition   Assessment   Assessment Pt seen for OT session focusing on self care, functional mobility/transfers and activity tolerance - agreeable to session but did not want to sit OOB today - functionally requires min a for UB and max a  for LB adls - min to mod a for transfers 2* weakness, impaired balance and overall poor tolerance to activity - continue to recommend inpt rehab with possible need for LTC - OT to continue to follow x 30 days 1-2x/wk to address goals as stated on most recent re-eval   Plan   Treatment Interventions ADL retraining;Functional transfer training;UE strengthening/ROM; Endurance training;Cognitive reorientation;Patient/family training;Equipment evaluation/education; Compensatory technique education; Energy conservation; Activityengagement   Goal Expiration Date 11/16/22   OT Treatment Day 7   OT Frequency 1-2x/wk   Recommendation   OT Discharge Recommendation Post acute rehabilitation services   AM-PAC Daily Activity Inpatient   Lower Body Dressing 2   Bathing 2   Toileting 2   Upper Body Dressing 3   Grooming 3   Eating 3   Daily Activity Raw Score 15   Daily Activity Standardized Score (Calc for Raw Score >=11) 34 69   AM-PAC Applied Cognition Inpatient   Following a Speech/Presentation 2   Understanding Ordinary Conversation 4   Taking Medications 2   Remembering Where Things Are Placed or Put Away 3   Remembering List of 4-5 Errands 2   Taking Care of Complicated Tasks 2   Applied Cognition Raw Score 15   Applied Cognition Standardized Score 33 54   End of Consult   Patient Position at End of Consult Bed/Chair alarm activated;Supine; All needs within reach     The patient's raw score on the AM-PAC Daily Activity inpatient short form is 15, standardized score is 34 69, less than 39 4   Patients at this level are likely to benefit from discharge to post-acute rehabilitation services  Please refer to the recommendation of the Occupational Therapist for safe discharge planning      [facility]

## 2022-10-17 NOTE — ASSESSMENT & PLAN NOTE
Today on 10/11/22 afternoon and rapid response was called for fall  -as per RR note and per patient, she was leaning forward , getting gou tof the bed  to grab a pillow, her leg got caught  and she fell  -denies head strike, loss of consciousness, pain, any lightheadaness, diaphoresis before or after fall  -patient moving all extremities after  Fall  - vital signs stable  -patient was placed on soft C-collar and CT head as well as cervical spine was obtained stat-result showed no fracture or subluxation  -fall precaution  -d/w brother Bethanysamia FitzgeraldNéstor and updated her status/incident of fall   Also discussed regardign [address]

## 2022-10-17 NOTE — PROGRESS NOTES
NEPHROLOGY PROGRESS NOTE   Spring Iverson 76 y o  female MRN: [id number]  Unit/Bed#: [facility] 323-01 Encounter: [id number]  Reason for Consult: NOAH    ASSESSMENT AND PLAN:  NOAH on CKD stage 3, baseline creatinine 0 8 to 1 2 since early 2022, more recently creatinine seems to have plateaued around 1 4 to 1 6 since early September 2022  -creatinine 1 8 yesterday slightly increased, no BMP available today  Patient has refused blood work today   -if patient agreeable, check BMP  -GFR likely overestimated given poor muscle mass  -UA earlier this month showed 1+ proteinuria, 4 to 10 RBCs/WBCs   -elevated creatinine suspected to be secondary to UTI, infection issues  Hyponatremia  -last serum sodium 133 relatively stable yesterday   -suspect in the setting of poor solute intake  -workup includes urine sodium 99, urine osmolality 415, serum osmolality 283  -currently on salt tablet 2 g p o  T i d  Chronic hypotension, currently on midodrine 10 mg t i d , continue same  Metabolic acidosis, currently remains on sodium bicarb one tablet p o  B i d   Most recent serum bicarb is improved 26  BMP to follow  Bicarb level remains at goal, consider discontinue sodium bicarb    Initial UTI, status post antibiotic  Recent C diff infection, completed C diff prophylaxis  Malnutrition, on tube feeds    Anemia with recent GI bleed, transfuse p r n  For hemoglobin less than seven  Continue to monitor    Discussed above plan in detail with primary team     SUBJECTIVE:  Patient seen and examined at bedside  She has refused blood work today  She denies any chest pain, shortness of breath  Does have occasional nausea  OBJECTIVE:  Current Weight: Weight - Scale:  (Unable to weigh Pt   Bed not accessible and Pt too weak to get OOB )  Vitals:    10/17/22 0841   BP: 116/62   Pulse: 87   Resp: 14   Temp: 99 3 °F (37 4 °C)   SpO2: 90%       Intake/Output Summary (Last 24 hours) at 10/17/2022 1218  Last data filed at 10/17/2022 4865  Gross per 24 hour   Intake 240 ml   Output 900 ml   Net -660 ml     Wt Readings from Last 3 Encounters:   09/22/22 48 3 kg (106 lb 7 7 oz)   08/09/22 59 6 kg (131 lb 6 4 oz)   08/02/22 52 2 kg (115 lb)     Temp Readings from Last 3 Encounters:   10/17/22 99 3 °F (37 4 °C) (Oral)   08/09/22 99 3 °F (37 4 °C) (Oral)   07/20/22 98 4 °F (36 9 °C) (Temporal)     BP Readings from Last 3 Encounters:   10/17/22 116/62   08/09/22 130/61   07/20/22 103/57     Pulse Readings from Last 3 Encounters:   10/17/22 87   08/09/22 91   07/20/22 76        Physical Examination:  General:  Lying in bed, no acute distress   Eyes:  Mild conjunctival pallor present  ENT:  External examination of ears and nose unremarkable  Neck:  No obvious lymphadenopathy appreciated  Respiratory:  Decreased breath sound at bases  CVS:  S1, S2 present  GI:  Soft, nondistended  CNS:  Active, alert, follows commands  Skin:  No new rash  Musculoskeletal:  No obvious new gross deformity noted    Medications:    Current Facility-Administered Medications:   •  acetaminophen (TYLENOL) tablet 975 mg, 975 mg, Oral, Q6H PRN, Tanya Giron PA-C, 975 mg at 09/26/22 2315  •  ALPRAZolam (XANAX) tablet 0 25 mg, 0 25 mg, Oral, TID PRN, Lesley Hernandez DO, 0 25 mg at 09/24/22 1616  •  alteplase (CATHFLO) injection 2 mg, 2 mg, Intracatheter, Once, Herman YNES Shoemaker  •  bisacodyl (DULCOLAX) rectal suppository 10 mg, 10 mg, Rectal, Daily PRN, Jone Trinidad MD  •  bisacodyl (DULCOLAX) rectal suppository 10 mg, 10 mg, Rectal, Once, Dhruv Hunt MD  •  clonazePAM (KlonoPIN) tablet 3 mg, 3 mg, Oral, HS, Jovita Mcdermott, DO, 3 mg at 14/36/01 4347  •  folic acid (FOLVITE) tablet 1 mg, 1 mg, Oral, Daily, Jovita Mcdermott DO, 1 mg at 10/17/22 1058  •  guaiFENesin (MUCINEX) 12 hr tablet 600 mg, 600 mg, Oral, Q12H [facility], Aurelia Encinas PA-C, 600 mg at 10/17/22 1057  •  heparin (porcine) subcutaneous injection 5,000 Units, 5,000 Units, Subcutaneous, Q8H [facility], Anirudh Johnson, DO, 5,000 Units at 10/17/22 1091  •  levothyroxine tablet 125 mcg, 125 mcg, Oral, Q24H, Amariuwatomisin Pauline Lambert MD, 125 mcg at 10/16/22 1512  •  metoclopramide (REGLAN) injection 10 mg, 10 mg, Intravenous, Q6H PRN, Tyrell Nash MD  •  midodrine (PROAMATINE) tablet 10 mg, 10 mg, Oral, TID AC, TOLU Orellana, 10 mg at 10/17/22 1103  •  ondansetron (ZOFRAN) injection 4 mg, 4 mg, Intravenous, Q6H PRN, Jovita Mcdermott DO, 4 mg at 09/18/22 1339  •  oxyCODONE (ROXICODONE) IR tablet 5 mg, 5 mg, Oral, Q6H, Tyrell Nash MD, 5 mg at 10/17/22 1107  •  pantoprazole (PROTONIX) EC tablet 40 mg, 40 mg, Oral, BID AC, Kaylan Arroyo DO, 40 mg at 10/17/22 0609  •  polyethylene glycol (MIRALAX) packet 17 g, 17 g, Per PEG Tube, BID, Jovitakash Mcdermott DO, 17 g at 10/17/22 1058  •  senna (SENOKOT) tablet 17 2 mg, 2 tablet, Oral, HS, Laila Jaramillo MD, 17 2 mg at 10/16/22 2119  •  sodium bicarbonate tablet 650 mg, 650 mg, Oral, BID after meals, TOLU Mcarthur, 650 mg at 10/17/22 1059  •  sodium chloride tablet 2 g, 2 g, Oral, TID, Dylon Landis DO, 2 g at 10/17/22 1059  •  thiamine tablet 100 mg, 100 mg, Oral, Daily, Jovita Mcdermott DO, 100 mg at 10/17/22 1100  •  traZODone (DESYREL) tablet 150 mg, 150 mg, Oral, HS, Jovita Mcdermott DO, 150 mg at 10/16/22 2119    Laboratory Results:  Results from last 7 days   Lab Units 10/15/22  1049 10/13/22  1717 10/12/22  1220   WBC Thousand/uL 12 14*  --  10 17*   HEMOGLOBIN g/dL 7 9*  --  7 4*   HEMATOCRIT % 26 4*  --  23 2*   PLATELETS Thousands/uL 488*  --  504*   SODIUM mmol/L 133* 134* 133*   POTASSIUM mmol/L 4 3 4 6 4 2   CHLORIDE mmol/L 102 103 103   CO2 mmol/L 26 23 23   BUN mg/dL 35* 34* 35*   CREATININE mg/dL 1 80* 1 72* 1 74*   CALCIUM mg/dL 9 3 9 4 9 1       CT head wo contrast   Final Result by Beatriz Reese MD (10/11 1651)      No acute intracranial abnormality                    Workstation performed: [id number]         CT spine cervical wo contrast   Final Result by Polo Alejandro MD (40/03 1281)      No acute cervical spine fracture or traumatic malalignment  Workstation performed: [id number]         CT chest abdomen pelvis w contrast   Final Result by Tello Goddard MD (09/27 1215)      1  Patchy and groundglass opacities in the upper lobes  The appearance is most typical for pulmonary edema though in the setting of systemic inflammatory response syndrome, atypical infection is not excluded  2   Small bilateral pleural effusions and trace ascites  3   Focal thickening of the lower portion of the endometrium  While this is not expected in a patient of this age, this appears stable from 2016 suggesting a benign etiology  If there is clinical concern, follow-up ultrasound is recommended  4   Cholelithiasis  5   Small left upper lobe nodules, one of which is new from March 2022  Given the history of smoking, a follow-up chest CT is recommended in 12 months  The study was marked in EPIC for significant notification  Workstation performed: [id number]         VAS upper limb venous duplex scan, complete, bilateral   Final Result by Arthur Dodson MD (09/25 2039)      VAS lower limb venous duplex study, complete bilateral   Final Result by Arthur Dodson MD (09/25 2039)      CT abdomen pelvis wo contrast   Final Result by Sheryle Manual, MD (09/23 1537)      1  Cholelithiasis with mild gallbladder luminal distention  Note is made of patient's elevated alkaline phosphatase  Ultrasound was recently performed (September 21, 2022), and correlation for clinical signs of acute cholecystitis is recommended  2   Large colonic stool burden without bowel obstruction  3   Trace bilateral pleural effusions           Workstation performed: [id number]         XR chest portable   Final Result by Torito Pittman MD (09/23 8483)      Increased interstitial markings, improved from 9/6/2022, question interstitial edema or infectious/inflammatory  Workstation performed: [id number]         US right upper quadrant with liver dopplers   Final Result by Burley Bosworth, MD (09/22 0704)      Cholelithiasis without findings of acute cholecystitis  Workstation performed: [id number]         XR abdomen 1 view kub   Final Result by Omari Su MD (09/15 0802)      No acute findings  Significant colon stool, correlate clinically for constipation  Workstation performed: [id number]         XR chest portable   Final Result by Stacy Black DO (09/07 1103)      Right-sided PICC tip projects at the right subclavian vein  Mild bilateral peribronchial thickening, possibly related to chronic bronchitis  No acute infiltrates  Minimal linear atelectasis or scarring in the mid lungs bilaterally  Workstation performed: [id number]         CT abdomen pelvis with contrast   Final Result by Tatyana Kate MD (34/31 6659)      1  Diffuse wall thickening of the colon with surrounding fat stranding suspicious for colitis i e  infectious or inflammatory  2   Mild circumferential wall thickening of the urinary bladder  Correlate with urinalysis for possible cystitis  3   Cholelithiasis  The study was marked in [facility] for immediate notification  Workstation performed: [id number]             Portions of the record may have been created with voice recognition software  Occasional wrong word or "sound a like" substitutions may have occurred due to the inherent limitations of voice recognition software  Read the chart carefully and recognize, using context, where substitutions have occurred

## 2022-10-17 NOTE — ASSESSMENT & PLAN NOTE
· Evaluated by palliative care  Wants to continue medical directed treatments with limits of DNR/DNI  · Spoke with patient's brother Sobia Navarrete on 10/11/22 who is power of  ( but financial power of ), We discussed goals of care  Brother, Sobia Navarrete agrees that we need to address palliative/comfort option again given her failure to thrive  He will discuss with rest of the siblings and they all will discussed with patient  In next few days he will update us wether they  would like to have a meeting with palliative/hospice again  10/13/22 Called [facility]  for f/u- no response  Called LUCIA- reji Ricow- wife responded- explained to her that I had spoken with Sobia Navarrete and to see if they had a family meeting  Also  patient did express that she feels comfort care is the route to go- reached out to Palliative team for follow up discussion on [address] and family meeting  10/14/22  Palliative team spoke with patient who today stated otherwise that she wants to do everything   Also Neuropsychiatry had evaluated patient and deemed no capacity, hence Palliative team has arranged a family meeting for Monday      10/17/22:  Pending goal care discussion today

## 2022-10-17 NOTE — PROGRESS NOTES
Progress note - Palliative and Supportive Care   Lucho Hartman 76 y o  female [id number]    Patient Active Problem List   Diagnosis   • Hypothyroidism   • Gastroesophageal reflux disease without esophagitis   • Depression   • Fibromyalgia, primary   • Iron deficiency   • Numbness of foot   • Dysphasia   • Epigastric pain   • Acute bronchitis   • Shortness of breath   • Fibromyalgia syndrome   • Osteopenia of both forearms   • Nasal congestion   • Bilateral hearing loss   • Anemia   • Dyspnea on exertion   • Generalized weakness   • Elevated LFTs   • Hyponatremia   • Abnormal CT scan   • H/O bariatric surgery - bypass   • Gastric ulcer   • Acute blood loss anemia   • NOAH (acute kidney injury) ([facility] [address] )   • Ambulatory dysfunction   • Severe protein-calorie malnutrition (HCC)   • Bilateral leg edema   • Gram-positive bacteremia   • [facility]   • Sacral ulcer ([facility] )   • Diarrhea   • Fall   • Colitis presumed to be due to infection   • Acute cystitis without hematuria   • Acute kidney insufficiency   • Moderate protein-calorie malnutrition (HCC)   • Bacteremia   • Acute on chronic anemia   • Goals of care, counseling/discussion   • Acute encephalopathy   • Hypotension   • Urinary retention   • Hyperkalemia     Active issues specifically addressed today include:   · Palliative care encounter  · Goals of care discussion  · protein calorie malnutrition    Plan:  1  Symptom management -   -  Per primary team, no acute symptoms    2  Goals -  -  Full cares without limits  -  Family meeting for further goals discussion  Recommendations and Plan:    -  Continued disease directed cares with limits of DNR/DNI  -  Discharge planning to nursing facility with rehab  A family meeting was held for goals of care discussion  This meeting was necessary for determine the appropriate course of treatment    Time of Meetin [facility] Location: P3 conference room  Participants:patient's sibling, Vivienne Vasquez, tatiana, brother in law, Dr Amaya Castro    [address] [facility]      Advanced Directive of POLST available: no    Topics of Discussion:  -  Review of clinical course and current medical status  -  Patient currently does not have medical decision making capacity but she is clear she wants to continue tube feedings  -  Patient does not have current hospice qualifying diagnosis  Other Content of Meeting:  Time Involved in Meetin minutes  beginning at approximately  1400  and ending at approximately 1075 [facility] Service: [phone]  You can find me on [employer]! Code Status: DNAR/DNI  - Level 3   Decisional apparatus:  Patient is not competent on my exam today  If competence is lost, patient's substitute decision maker would default to 5 adult siblings  by [place] 169  Advance Directive / Living Will / POLST:  None on file   Interval history:     Family meeting arranged and completed  Goals of care clear, continue disease focused treatment including tube feedings  Case management team working on identifying facilities close to sister and brother in law in [place]       MEDICATIONS / ALLERGIES:     current meds:   Current Facility-Administered Medications   Medication Dose Route Frequency   • acetaminophen (TYLENOL) tablet 975 mg  975 mg Oral Q6H PRN   • ALPRAZolam (XANAX) tablet 0 25 mg  0 25 mg Oral TID PRN   • bisacodyl (DULCOLAX) rectal suppository 10 mg  10 mg Rectal Daily PRN   • clonazePAM (KlonoPIN) tablet 3 mg  3 mg Oral HS   • folic acid (FOLVITE) tablet 1 mg  1 mg Oral Daily   • guaiFENesin (MUCINEX) 12 hr tablet 600 mg  600 mg Oral Q12H [facility]   • heparin (porcine) subcutaneous injection 5,000 Units  5,000 Units Subcutaneous Q8H [facility]   • levothyroxine tablet 125 mcg  125 mcg Oral Q24H   • metoclopramide (REGLAN) injection 10 mg  10 mg Intravenous Q6H PRN   • midodrine (PROAMATINE) tablet 10 mg  10 mg Oral TID AC • ondansetron (ZOFRAN) injection 4 mg  4 mg Intravenous Q6H PRN   • oxyCODONE (ROXICODONE) IR tablet 5 mg  5 mg Oral Q6H   • pantoprazole (PROTONIX) EC tablet 40 mg  40 mg Oral BID AC   • polyethylene glycol (MIRALAX) packet 17 g  17 g Per PEG Tube BID   • senna (SENOKOT) tablet 17 2 mg  2 tablet Oral HS   • sodium bicarbonate tablet 650 mg  650 mg Oral BID after meals   • sodium chloride tablet 2 g  2 g Oral TID   • thiamine tablet 100 mg  100 mg Oral Daily   • traZODone (DESYREL) tablet 150 mg  150 mg Oral HS       No Known Allergies    OBJECTIVE:    Physical Exam  Physical Exam  Vitals and nursing note reviewed  Constitutional:       General: She is awake  She is not in acute distress  Appearance: She is underweight  HENT:      Head: Normocephalic and atraumatic  Mouth/Throat:      Lips: Pink  Mouth: Mucous membranes are moist       Pharynx: Oropharynx is clear  Eyes:      General: Lids are normal       Extraocular Movements: Extraocular movements intact  Conjunctiva/sclera: Conjunctivae normal    Cardiovascular:      Rate and Rhythm: Normal rate and regular rhythm  Heart sounds: No murmur heard  Pulmonary:      Effort: Pulmonary effort is normal  No respiratory distress or retractions  Breath sounds: Normal breath sounds  Abdominal:      Palpations: Abdomen is soft  Tenderness: There is no abdominal tenderness  Musculoskeletal:      Cervical back: Neck supple  Comments: Generally weak    Skin:     General: Skin is warm and dry  Coloration: Skin is pale  Neurological:      General: No focal deficit present  Mental Status: She is alert  GCS: GCS eye subscore is 4  GCS verbal subscore is 4  GCS motor subscore is 6  Motor: Motor function is intact  Coordination: Coordination is intact  Comments: Oriented to place and time, not situation    Psychiatric:         Attention and Perception: She is inattentive           Speech: Speech normal          Behavior: Behavior is cooperative  Lab Results:   CBC:   Lab Results   Component Value Date    WBC 12 74 (H) 10/17/2022    HGB 8 0 (L) 10/17/2022    HCT 25 4 (L) 10/17/2022     (H) 10/17/2022     (H) 10/17/2022    MCH 31 4 10/17/2022    MCHC 31 5 10/17/2022    RDW 19 4 (H) 10/17/2022    MPV 9 5 10/17/2022    NRBC 0 10/17/2022   , CMP: No results found for: SODIUM, K, CL, CO2, ANIONGAP, BUN, CREATININE, GLUCOSE, CALCIUM, AST, ALT, ALKPHOS, PROT, BILITOT, EGFR, PT/PTT:No results found for: PT, PTT      Counseling / Coordination of Care    Total floor / unit time spent today 90+  minutes  Greater than 50% of total time was spent with the patient and / or family counseling and / or coordination of care  A description of the counseling / coordination of care: family meeting, chart review, coordination with attending and case management

## 2022-10-17 NOTE — PLAN OF CARE
Problem: PHYSICAL THERAPY ADULT  Goal: Performs mobility at highest level of function for planned discharge setting  See evaluation for individualized goals  Description:    Equipment Recommended:  (RW)       See flowsheet documentation for full assessment, interventions and recommendations  Outcome: Not Progressing  Note: Prognosis: Fair  Problem List: Decreased strength, Decreased range of motion, Decreased endurance, Impaired balance, Decreased mobility, Decreased coordination, Decreased cognition, Impaired judgement, Decreased safety awareness, Impaired sensation, Decreased skin integrity, Pain  Assessment: pt was agreeable to participate in PT this date consisting of therex + theract  Pt is severely deconditioned w/ gross weakness and ongoing abdominal pain ; fraility limiting progression  Pt participation levels w/ therapies continues to wax/wane and overall pt continues to require mod- maxA x1-2 for bed skills; transfers and very limited gait  PT to continue to follow and progress as able- although tiny ab potential remains guarded and pt will likely require LTC as ultimate plan on d/c  Barriers to Discharge: Inaccessible home environment, Decreased caregiver support     PT Discharge Recommendation: Post acute rehabilitation services (? transition to LTC)    See flowsheet documentation for full assessment

## 2022-10-17 NOTE — ASSESSMENT & PLAN NOTE
· History of Savage-en-Y gastric bypass  Has had significant issues over the last 6 months or so with chronic malnutrition and an anastomotic ulcerations  When last seen by bariatric surgery, it was recommended to continue on prolonged TPN for nutritional optimization with possible revision of the Savage-en-Y in the future  · Given her recurrence PICC line infections and bacteremia, TPN has been discontinued  · Continue tube feeds for nutritional optimization  Patient has been tolerating tube feeds without any difficulty    Is also continued on pleasure feeds  · Will need significant improvement before consideration can be given to further surgical intervention

## 2022-10-17 NOTE — PROGRESS NOTES
[address] [facility]  Progress Note - Agapito Pennington 1945, 76 y o  female MRN: [id number]  Unit/Bed#: [facility] 323-01 Encounter: [id number]  Primary Care Provider: Lise Ray MD   Date and time admitted to hospital: 8/30/2022 11:49 PM    Hyperkalemia  Assessment & Plan    · Nephrology following - giving calcium gluconate, D50/insulin, sodium bicarb given October 5th  · Low-potassium pleasure feed diet  · Change tube feeds to Nephro  · k normal now    Urinary retention  Assessment & Plan  · Has required intermittent catheterizations however now voiding spontaneously  · Continue retention protocol    Hypotension  Assessment & Plan  · Continue midodrine 10 mg t i d     Acute encephalopathy  Assessment & Plan  · Patient has waxing waning mental status   · Cognition and mental status issues multifactorial in nature stemming from prolonged illness, numerous hospitalizations, chronic malnutrition, and chronic/recurrent infections  · Monitor clinically  · Delirium precautions  · Neuropsych consulted for capacity eval-patient does not have capacity per neuropsych      Goals of care, counseling/discussion  Assessment & Plan  · Evaluated by palliative care  Wants to continue medical directed treatments with limits of DNR/DNI  · Spoke with patient's brother Sobia Navarrete on 10/11/22 who is power of  ( but financial power of ), We discussed goals of care  Brother, Sobia Navarrete agrees that we need to address palliative/comfort option again given her failure to thrive  He will discuss with rest of the siblings and they all will discussed with patient  In next few days he will update us wether they  would like to have a meeting with palliative/hospice again  10/13/22 Called [facility]  for f/u- no response  Called LUCIA- reji Locke- wife responded- explained to her that I had spoken with Sobia Navarrete and to see if they had a family meeting    Also  patient did express that she feels comfort care is the route to go- reached out to Palliative team for follow up discussion on [address] and family meeting  10/14/22  Palliative team spoke with patient who today stated otherwise that she wants to do everything  Also Neuropsychiatry had evaluated patient and deemed no capacity, hence Palliative team has arranged a family meeting for Monday      10/17/22:  Pending goal care discussion today    Acute on chronic anemia  Assessment & Plan  · 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  · S/p recent EGD in 7/22 revealing: Residual marginal ulcer of the gastrojejunostomy anastomosis with improved size  · S/p EGD 9/2 revealing: Large, cratered ulcer in the gastrojejunal anastomosis   · Status post 1 unit PRBCs this admission  · Continue PPI b i d    ·     Bacteremia  Assessment & Plan  · Recent hx of staph epi bacteremia in 7/2022, presumed due to picc line  · Recurrence 1/2 set staph epi, 2nd set without growth  Was treated with intravenous vancomycin for presumed line infection  · S/p TAVIA (9/9) no evidence of vegetation  · Blood cultures had been negative however repeated again due to isolated fever which are negative to date  · PICC and TPN discontinued, now has PEG tube  · Monitor off abx-monitoring for fevers, up trending leukocytosis    Moderate protein-calorie malnutrition (HCC)  Assessment & Plan  Malnutrition Findings:   Adult Malnutrition type: Chronic illness  Adult Degree of Malnutrition: Other severe protein calorie malnutrition  Malnutrition Characteristics: Weight loss, Muscle loss       360 Statement: Severe/Chronic malnutrition r/t condition, as evidenced by 4 8% wt loss x < 1 week (9/2/22: 125#, 9/5/22: 119#), and severe muscle mass depletion (temples/clavicle)  Treated with liberalized diet and nutrition supplements, possibly nutrition support pending goals of care    BMI Findings: Body mass index is 18 28 kg/m²     · Continue tube feeds    Fall  Assessment & Plan  Today on 10/11/22 afternoon and rapid response was called for fall  -as per RR note and per patient, she was leaning forward , getting gou tof the bed  to grab a pillow, her leg got caught  and she fell  -denies head strike, loss of consciousness, pain, any lightheadaness, diaphoresis before or after fall  -patient moving all extremities after  Fall  - vital signs stable  -patient was placed on soft C-collar and CT head as well as cervical spine was obtained stat-result showed no fracture or subluxation  -fall precaution  -d/w brother Cory Mello and updated her status/incident of fall   Also discussed regardign [facility]    Diarrhea  Assessment & Plan  · Recent history of C diff colitis  · Treated with prophylactic dose of p o  Vancomycin while on antibiotics   · Patient reported some mild abdominal pain this morning but resolved     Ambulatory dysfunction  Assessment & Plan  · Will need rehab on discharge      NOAH (acute kidney injury) ([facility] [address] )  Assessment & Plan  · Renal function appears to have stabilized around creatinine of 1 5-1 7  · Morning labs pending patient initially refused but is now agreeable  · Possibly new baseline per Nephrology  ·     H/O bariatric surgery - bypass  Assessment & Plan  · History of Savage-en-Y gastric bypass  Has had significant issues over the last 6 months or so with chronic malnutrition and an anastomotic ulcerations  When last seen by bariatric surgery, it was recommended to continue on prolonged TPN for nutritional optimization with possible revision of the Savage-en-Y in the future  · Given her recurrence PICC line infections and bacteremia, TPN has been discontinued  · Continue tube feeds for nutritional optimization  Patient has been tolerating tube feeds without any difficulty    Is also continued on pleasure feeds  · Will need significant improvement before consideration can be given to further surgical intervention      Abnormal CT scan  Assessment & Plan  · Noted on CT C/A/P 9/26  - Focal thickening of the lower portion of the endometrium  While this is not expected in a patient of this age, this appears stable from 2016 suggesting a benign etiology  If there is clinical concern, follow-up ultrasound is recommended  - Small left upper lobe nodules, one of which is new from March 2022  Given the history of smoking, a follow-up chest CT is recommended in 12 months  · This was d/w sister and brother POA over phone by prior provider 9/28    Hyponatremia  Assessment & Plan    Nephrology following   Hyponatremia the setting of poor solute intake with gastric bypass, continue with salt tablets 2 g t i d , minimize free water flushes with tube feeds  Hypothyroidism  Assessment & Plan  · TSH elevated and fT4 low  · Endocrinology consulted - levothyroxine timing was adjusted to 2pm per Endocrinology recs (4 hours after tube feeds have stopped running)  · Repeat TFTs in 1 week per Endocrine    * Sepsis ([facility] [address] )  Assessment & Plan  · Initially met criteria with fever, tachypnea, tachycardia, and leukocytosis on admission  Sepsis secondary to recurrent bacteremia and PICC line infections  · PICC line has been removed and patient had PEG tube placed for nutritional support  · Finished full course of ertapenem for ESBL UTI  · Patient with recurrent fever 10/7/22- Started empiric vancomycin whic was dced  · Discussed with infectious disease  observe off of antibiotic , cultures- sent on 10/9/22- NGTD  · Will check CBC to monitor leukocytosis    Transaminitis-resolved as of 10/2/2022  Assessment & Plan  · NO RUQ pain, USG with gallstones, no choelcystitis  · Will trend    Left upper quadrant abdominal pain-resolved as of 9/28/2022  Assessment & Plan  · Improved  Retention disc from PEG was loosened by Gastroenterology      · Also with concern for constipation on bowel regimen  · CT negative for acute findings          VTE Pharmacologic Prophylaxis: VTE Score: 3 Moderate Risk (Score 3-4) - Pharmacological DVT Prophylaxis Ordered: heparin  Patient Centered Rounds: I performed bedside rounds with nursing staff today  Discussions with Specialists or Other Care Team Provider: n/a    Education and Discussions with Family / Patient: Updated  (brother) via phone  Time Spent for Care: 30 minutes  More than 50% of total time spent on counseling and coordination of care as described above  Current Length of Stay: 47 day(s)  Current Patient Status: Inpatient   Certification Statement: The patient will continue to require additional inpatient hospital stay due to Pending goals of care discussion likely will be pending discharge to rehab  Discharge Plan: Anticipate discharge in 24-48 hrs to rehab facility  Code Status: Level 3 - DNAR and DNI    Subjective:   Patient denies any acute complaints, were she to continue her PEG tube feeding  Denies any fevers chills nausea vomiting abdominal pain    Objective:     Vitals:   Temp (24hrs), Av °F (37 2 °C), Min:98 6 °F (37 °C), Max:99 3 °F (37 4 °C)    Temp:  [98 6 °F (37 °C)-99 3 °F (37 4 °C)] 99 3 °F (37 4 °C)  HR:  [77-87] 87  Resp:  [14-16] 14  BP: (100-116)/(55-62) 116/62  SpO2:  [90 %-96 %] 90 %  Body mass index is 18 28 kg/m²  Input and Output Summary (last 24 hours): Intake/Output Summary (Last 24 hours) at 10/17/2022 1456  Last data filed at 10/17/2022 4728  Gross per 24 hour   Intake 240 ml   Output 900 ml   Net -660 ml       Physical Exam:   Physical Exam  Vitals and nursing note reviewed  Constitutional:       General: She is not in acute distress  Appearance: She is well-developed  She is ill-appearing  She is not toxic-appearing or diaphoretic  Comments: Chronically ill-appearing  Facial scarring noted   HENT:      Head: Normocephalic and atraumatic  Eyes:      General: No scleral icterus  Conjunctiva/sclera: Conjunctivae normal    Cardiovascular:      Rate and Rhythm: Normal rate and regular rhythm  Heart sounds: No murmur heard  No friction rub  No gallop  Pulmonary:      Effort: Pulmonary effort is normal  No respiratory distress  Breath sounds: Normal breath sounds  No stridor  No wheezing, rhonchi or rales  Chest:      Chest wall: No tenderness  Abdominal:      General: There is no distension  Palpations: Abdomen is soft  There is no mass  Tenderness: There is no abdominal tenderness  There is no guarding or rebound  Hernia: No hernia is present  Comments: PEG tube in place without any surrounding erythema discharge or tenderness   Musculoskeletal:         General: No swelling, tenderness, deformity or signs of injury  Cervical back: Neck supple  Skin:     General: Skin is warm and dry  Coloration: Skin is pale  Skin is not jaundiced  Neurological:      Mental Status: She is alert  Additional Data:     Labs:  Results from last 7 days   Lab Units 10/15/22  1049 10/12/22  1220   WBC Thousand/uL 12 14* 10 17*   HEMOGLOBIN g/dL 7 9* 7 4*   HEMATOCRIT % 26 4* 23 2*   PLATELETS Thousands/uL 488* 504*   NEUTROS PCT %  --  62   LYMPHS PCT %  --  14   MONOS PCT %  --  8   EOS PCT %  --  15*     Results from last 7 days   Lab Units 10/15/22  1049   SODIUM mmol/L 133*   POTASSIUM mmol/L 4 3   CHLORIDE mmol/L 102   CO2 mmol/L 26   BUN mg/dL 35*   CREATININE mg/dL 1 80*   ANION GAP mmol/L 5   CALCIUM mg/dL 9 3   GLUCOSE RANDOM mg/dL 110         Results from last 7 days   Lab Units 10/16/22  2100 10/16/22  1705 10/16/22  1127 10/16/22  0608 10/15/22  1821 10/15/22  1623 10/15/22  1206 10/15/22  0541 10/15/22  0042 10/14/22  1751 10/14/22  0625 10/14/22  0031   POC GLUCOSE mg/dl 102 105 103 113 88 115 89 96 105 91 100 118               Lines/Drains:  Invasive Devices  Report    Drain  Duration           Gastrostomy/Enterostomy Percutaneous Endoscopic Gastrostomy (PEG) 20 Fr  LUQ 38 days                      Imaging: No pertinent imaging reviewed      Recent Cultures (last 7 days):         Last 24 Hours Medication List:   Current Facility-Administered Medications   Medication Dose Route Frequency Provider Last Rate   • acetaminophen  975 mg Oral Q6H PRN Millicent Adler PA-C     • ALPRAZolam  0 25 mg Oral TID PRN Jeff Bentley, DO     • bisacodyl  10 mg Rectal Daily PRN Di Plasencia MD     • clonazePAM  3 mg Oral HS Jovitakash Mcdermott, DO     • folic acid  1 mg Oral Daily Jovitakash Mcdermott, DO     • guaiFENesin  600 mg Oral Q12H [facility] Millicent Adler PA-C     • heparin (porcine)  5,000 Units Subcutaneous [facility] Jeff Bentley, DO     • levothyroxine  125 mcg Oral Q24H [place] Paige Deng MD     • metoclopramide  10 mg Intravenous Q6H PRN Di Plasencia MD     • midodrine  10 mg Oral TID AC TOLU Orellana     • ondansetron  4 mg Intravenous Q6H PRN Imtiaz Pugh DO     • oxyCODONE  5 mg Oral Q6H Di Plasencia MD     • pantoprazole  40 mg Oral BID AC Kaylan Arroyo, DO     • polyethylene glycol  17 g Per PEG Tube BID Imtiaz Pugh, DO     • senna  2 tablet Oral HS Gillian Pretty MD     • sodium bicarbonate  650 mg Oral BID after meals TOLU Rosa     • sodium chloride  2 g Oral TID Nelly Oh, DO     • thiamine  100 mg Oral Daily Jovitakash Mcdermott, DO     • traZODone  150 mg Oral HS Imtiaz Pugh DO          Today, Patient Was Seen By: Leidy Reyes    **Please Note: This note may have been constructed using a voice recognition system  **

## 2022-10-18 NOTE — PLAN OF CARE
Problem: Potential for Falls  Goal: Patient will remain free of falls  Description: INTERVENTIONS:  - Educate patient/family on patient safety including physical limitations  - Instruct patient to call for assistance with activity   - Consult OT/PT to assist with strengthening/mobility   - Keep Call bell within reach  - Keep bed low and locked with side rails adjusted as appropriate  - Keep care items and personal belongings within reach  - Initiate and maintain comfort rounds  - Make Fall Risk Sign visible to staff  - Apply yellow socks and bracelet for high fall risk patients  - Consider moving patient to room near nurses station  Outcome: Progressing     Problem: MOBILITY - ADULT  Goal: Maintain or return to baseline ADL function  Description: INTERVENTIONS:  -  Assess patient's ability to carry out ADLs; assess patient's baseline for ADL function and identify physical deficits which impact ability to perform ADLs (bathing, care of mouth/teeth, toileting, grooming, dressing, etc )  - Assess/evaluate cause of self-care deficits   - Assess range of motion  - Assess patient's mobility; develop plan if impaired  - Assess patient's need for assistive devices and provide as appropriate  - Encourage maximum independence but intervene and supervise when necessary  - Involve family in performance of ADLs  - Assess for home care needs following discharge   - Consider OT consult to assist with ADL evaluation and planning for discharge  - Provide patient education as appropriate  Outcome: Progressing     Problem: INFECTION - ADULT  Goal: Absence or prevention of progression during hospitalization  Description: INTERVENTIONS:  - Assess and monitor for signs and symptoms of infection  - Monitor lab/diagnostic results  - Monitor all insertion sites, i e  indwelling lines, tubes, and drains  - Monitor endotracheal if appropriate and nasal secretions for changes in amount and color  - [place] appropriate cooling/warming therapies per order  - Administer medications as ordered  - Instruct and encourage patient and family to use good hand hygiene technique  - Identify and instruct in appropriate isolation precautions for identified infection/condition  Outcome: Progressing     Problem: SAFETY ADULT  Goal: Patient will remain free of falls  Description: INTERVENTIONS:  - Educate patient/family on patient safety including physical limitations  - Instruct patient to call for assistance with activity   - Consult OT/PT to assist with strengthening/mobility   - Keep Call bell within reach  - Keep bed low and locked with side rails adjusted as appropriate  - Keep care items and personal belongings within reach  - Initiate and maintain comfort rounds  - Make Fall Risk Sign visible to staff  - Offer Toileting every 2 Hours, in advance of need  - Initiate/Maintain bed/chair alarm  - Apply yellow socks and bracelet for high fall risk patients  - Consider moving patient to room near nurses station  Outcome: Progressing  Goal: Maintain or return to baseline ADL function  Description: INTERVENTIONS:  -  Assess patient's ability to carry out ADLs; assess patient's baseline for ADL function and identify physical deficits which impact ability to perform ADLs (bathing, care of mouth/teeth, toileting, grooming, dressing, etc )  - Assess/evaluate cause of self-care deficits   - Assess range of motion  - Assess patient's mobility; develop plan if impaired  - Assess patient's need for assistive devices and provide as appropriate  - Encourage maximum independence but intervene and supervise when necessary  - Involve family in performance of ADLs  - Assess for home care needs following discharge   - Consider OT consult to assist with ADL evaluation and planning for discharge  - Provide patient education as appropriate  Outcome: Progressing     Problem: DISCHARGE PLANNING  Goal: Discharge to home or other facility with appropriate resources  Description: INTERVENTIONS:  - Identify barriers to discharge w/patient and caregiver  - Arrange for needed discharge resources and transportation as appropriate  - Identify discharge learning needs (meds, wound care, etc )  - Arrange for interpretive services to assist at discharge as needed  - Refer to Case Management Department for coordinating discharge planning if the patient needs post-hospital services based on physician/advanced practitioner order or complex needs related to functional status, cognitive ability, or social support system  Outcome: Progressing       Problem: Nutrition/Hydration-ADULT  Goal: Nutrient/Hydration intake appropriate for improving, restoring or maintaining nutritional needs  Description: Monitor and assess patient's nutrition/hydration status for malnutrition  Collaborate with interdisciplinary team and initiate plan and interventions as ordered  Monitor patient's weight and dietary intake as ordered or per policy  Utilize nutrition screening tool and intervene as necessary  Determine patient's food preferences and provide high-protein, high-caloric foods as appropriate       INTERVENTIONS:  - Monitor oral intake, urinary output, labs, and treatment plans  - Assess nutrition and hydration status and recommend course of action  - Evaluate amount of meals eaten  - Assist patient with eating if necessary   - Allow adequate time for meals  - Recommend/ encourage appropriate diets, oral nutritional supplements, and vitamin/mineral supplements  - Order, calculate, and assess calorie counts as needed  - Recommend, monitor, and adjust tube feedings and TPN/PPN based on assessed needs  - Assess need for intravenous fluids  - Provide specific nutrition/hydration education as appropriate  - Include patient/family/caregiver in decisions related to nutrition  Outcome: Progressing

## 2022-10-18 NOTE — PROGRESS NOTES
NEPHROLOGY PROGRESS NOTE   Agapito Pennington 76 y o  female MRN: [id number]  Unit/Bed#: [facility] 323-01 Encounter: [id number]  Reason for Consult: NOAH    ASSESSMENT AND PLAN:  NOAH on CKD stage 3, baseline creatinine 0 8 to 1 2 since early 2022, more recently creatinine seems to have plateaued around 1 4 to 1 6 since early September 2022  -creatinine relatively stable 1 7 yesterday  No BMP available today  Creatinine seems to have plateaued 1 7 to 1 8 over last one week  -BMP in a m   -will give IV albumin 25 g once today  -GFR likely overestimated given poor muscle mass  -UA earlier this month showed 1+ proteinuria, 4 to 10 RBCs/WBCs   -elevated creatinine suspected to be secondary to UTI, infection issues  -noted peripheral eosinophilia, check urine eosinophils, no new rash  -recommend discontinue Protonix and changing to Pepcid      Hyponatremia  -last serum sodium 132 slightly dropped but relatively stable   -suspect in the setting of poor solute intake  -workup includes urine sodium 99, urine osmolality 415, serum osmolality 283  -currently on salt tablet 2 g p o  T i d   -BMP in a m     If dropping further, consider increasing salt tablet with addition of Lasix  -she gets free water flushes 50 mL Q six hourly with tube feeds      Chronic hypotension, currently on midodrine 10 mg t i d , continue same      Metabolic acidosis, bicarb level continue to improve 28, discontinue sodium bicarb supplements       Initial UTI, status post antibiotic  Recent C diff infection, completed C diff prophylaxis  Malnutrition, on tube feeds     Anemia with recent GI bleed, transfuse p r n  For hemoglobin less than seven  Continue to monitor     Discussed above plan in detail with primary team     SUBJECTIVE:  Patient seen and examined at bedside    Denies nausea, vomiting or chest pain    OBJECTIVE:  Current Weight: Weight - Scale: 49 4 kg (109 lb)  Vitals:    10/17/22 2136   BP: 129/60   Pulse: 82   Resp: 16   Temp: 99 °F (37 2 °C)   SpO2: 94%     No intake or output data in the 24 hours ending 10/18/22 1218  Wt Readings from Last 3 Encounters:   10/18/22 49 4 kg (109 lb)   08/09/22 59 6 kg (131 lb 6 4 oz)   08/02/22 52 2 kg (115 lb)     Temp Readings from Last 3 Encounters:   10/17/22 99 °F (37 2 °C) (Oral)   08/09/22 99 3 °F (37 4 °C) (Oral)   07/20/22 98 4 °F (36 9 °C) (Temporal)     BP Readings from Last 3 Encounters:   10/17/22 129/60   08/09/22 130/61   07/20/22 103/57     Pulse Readings from Last 3 Encounters:   10/17/22 82   08/09/22 91   07/20/22 76        Physical Examination:  General:  Lying in bed, no acute distress   Eyes:  Mild conjunctival pallor present  ENT:  External examination of ears and nose unremarkable  Neck:  No Obvious lymphadenopathy appreciated  Respiratory:  Bilateral air entry present  CVS:  S1, S2 present  GI:  Soft, nondistended  CNS:  Active alert oriented  Skin:  No new rash  Musculoskeletal:  No obvious new gross deformity noted    Medications:    Current Facility-Administered Medications:   •  acetaminophen (TYLENOL) tablet 975 mg, 975 mg, Oral, Q6H PRN, Sherry Mejía PA-C, 975 mg at 09/26/22 2315  •  ALPRAZolam (XANAX) tablet 0 25 mg, 0 25 mg, Oral, TID PRN, Natanael Cho DO, 0 25 mg at 09/24/22 1616  •  bisacodyl (DULCOLAX) rectal suppository 10 mg, 10 mg, Rectal, Daily PRN, Patricia Feliz MD  •  clonazePAM (KlonoPIN) tablet 3 mg, 3 mg, Oral, HS, Jovita Mcdermott, DO, 3 mg at 26/18/36 6999  •  folic acid (FOLVITE) tablet 1 mg, 1 mg, Oral, Daily, Jovita Mcdermott, DO, 1 mg at 10/17/22 1058  •  guaiFENesin (MUCINEX) 12 hr tablet 600 mg, 600 mg, Oral, Q12H [facility], Aurelia Encinas PA-C, 600 mg at 10/17/22 2206  •  heparin (porcine) subcutaneous injection 5,000 Units, 5,000 Units, Subcutaneous, Q8H [facility], [facility], 5,000 Units at 10/18/22 0606  •  levothyroxine tablet 125 mcg, 125 mcg, Oral, Q24H, Oluwatomisin Zehra Dodge MD, 125 mcg at 10/17/22 1440  •  metoclopramide (REGLAN) injection 10 mg, 10 mg, Intravenous, Q6H PRN, Lidia Campbell MD  •  midodrine (PROAMATINE) tablet 10 mg, 10 mg, Oral, TID AC, TOLU Orellana, 10 mg at 10/18/22 0607  •  ondansetron (ZOFRAN) injection 4 mg, 4 mg, Intravenous, Q6H PRN, Jovita Mcdermott DO, 4 mg at 09/18/22 1339  •  oxyCODONE (ROXICODONE) IR tablet 5 mg, 5 mg, Oral, Q6H, Lidia Campbell MD, 5 mg at 10/17/22 1739  •  pantoprazole (PROTONIX) EC tablet 40 mg, 40 mg, Oral, BID AC, Kaylan Arroyo DO, 40 mg at 10/18/22 0607  •  polyethylene glycol (MIRALAX) packet 17 g, 17 g, Per PEG Tube, BID, Jovita Mcdermott DO, 17 g at 10/17/22 2206  •  senna (SENOKOT) tablet 17 2 mg, 2 tablet, Oral, HS, Mark Norris MD, 17 2 mg at 10/17/22 2206  •  sodium bicarbonate tablet 650 mg, 650 mg, Oral, BID after meals, TOLU Paula, 650 mg at 10/17/22 1741  •  sodium chloride tablet 2 g, 2 g, Oral, TID, Jeff Weiner DO, 2 g at 10/17/22 2213  •  thiamine tablet 100 mg, 100 mg, Oral, Daily, Jovitakash Mcdermott DO, 100 mg at 10/17/22 1100  •  traZODone (DESYREL) tablet 150 mg, 150 mg, Oral, HS, Jovitakash Mcdermott DO, 150 mg at 10/17/22 2206    Laboratory Results:  Results from last 7 days   Lab Units 10/17/22  1629 10/15/22  1049 10/13/22  1717 10/12/22  1220   WBC Thousand/uL 12 74* 12 14*  --  10 17*   HEMOGLOBIN g/dL 8 0* 7 9*  --  7 4*   HEMATOCRIT % 25 4* 26 4*  --  23 2*   PLATELETS Thousands/uL 441* 488*  --  504*   SODIUM mmol/L 132* 133* 134* 133*   POTASSIUM mmol/L 4 2 4 3 4 6 4 2   CHLORIDE mmol/L 100 102 103 103   CO2 mmol/L 28 26 23 23   BUN mg/dL 34* 35* 34* 35*   CREATININE mg/dL 1 77* 1 80* 1 72* 1 74*   CALCIUM mg/dL 10 2* 9 3 9 4 9 1       CT head wo contrast   Final Result by Aj Bocanegra MD (10/11 1651)      No acute intracranial abnormality                    Workstation performed: [id number]         CT spine cervical wo contrast   Final Result by Aj Bocanegra MD (10/11 1654)      No acute cervical spine fracture or traumatic malalignment  Workstation performed: [id number]         CT chest abdomen pelvis w contrast   Final Result by Iza Philippe MD (09/27 1215)      1  Patchy and groundglass opacities in the upper lobes  The appearance is most typical for pulmonary edema though in the setting of systemic inflammatory response syndrome, atypical infection is not excluded  2   Small bilateral pleural effusions and trace ascites  3   Focal thickening of the lower portion of the endometrium  While this is not expected in a patient of this age, this appears stable from 2016 suggesting a benign etiology  If there is clinical concern, follow-up ultrasound is recommended  4   Cholelithiasis  5   Small left upper lobe nodules, one of which is new from March 2022  Given the history of smoking, a follow-up chest CT is recommended in 12 months  The study was marked in EPIC for significant notification  Workstation performed: [id number]         VAS upper limb venous duplex scan, complete, bilateral   Final Result by Kacie Bobby MD (09/25 2039)      VAS lower limb venous duplex study, complete bilateral   Final Result by Kacie Bobby MD (09/25 2039)      CT abdomen pelvis wo contrast   Final Result by Anish Cunningham MD (09/23 8200)      1  Cholelithiasis with mild gallbladder luminal distention  Note is made of patient's elevated alkaline phosphatase  Ultrasound was recently performed (September 21, 2022), and correlation for clinical signs of acute cholecystitis is recommended  2   Large colonic stool burden without bowel obstruction  3   Trace bilateral pleural effusions  Workstation performed: [id number]         XR chest portable   Final Result by Avelina Perez MD (09/23 8289)      Increased interstitial markings, improved from 9/6/2022, question interstitial edema or infectious/inflammatory                    Workstation performed: [id number] US right upper quadrant with liver dopplers   Final Result by Mary Ware MD (09/22 8695)      Cholelithiasis without findings of acute cholecystitis  Workstation performed: [id number]         XR abdomen 1 view kub   Final Result by Haley Roberto MD (09/15 0802)      No acute findings  Significant colon stool, correlate clinically for constipation  Workstation performed: [id number]         XR chest portable   Final Result by Julio Tillman DO (09/07 1103)      Right-sided PICC tip projects at the right subclavian vein  Mild bilateral peribronchial thickening, possibly related to chronic bronchitis  No acute infiltrates  Minimal linear atelectasis or scarring in the mid lungs bilaterally  Workstation performed: [id number]         CT abdomen pelvis with contrast   Final Result by Sonam Mejia MD (87/90 8269)      1  Diffuse wall thickening of the colon with surrounding fat stranding suspicious for colitis i e  infectious or inflammatory  2   Mild circumferential wall thickening of the urinary bladder  Correlate with urinalysis for possible cystitis  3   Cholelithiasis  The study was marked in [facility] for immediate notification  Workstation performed: [id number]             Portions of the record may have been created with voice recognition software  Occasional wrong word or "sound a like" substitutions may have occurred due to the inherent limitations of voice recognition software  Read the chart carefully and recognize, using context, where substitutions have occurred

## 2022-10-18 NOTE — ASSESSMENT & PLAN NOTE
· Evaluated by palliative care  Wants to continue medical directed treatments with limits of DNR/DNI  · Spoke with patient's brother Christa Munoz on 10/11/22 who is power of  ( but financial power of ), We discussed goals of care  Brother, Christa Munoz agrees that we need to address palliative/comfort option again given her failure to thrive  He will discuss with rest of the siblings and they all will discussed with patient  In next few days he will update us wether they  would like to have a meeting with palliative/hospice again  10/13/22 Called [facility]  for f/u- no response  Called LUCIA- reji Ricow- wife responded- explained to her that I had spoken with Christa Munoz and to see if they had a family meeting  Also  patient did express that she feels comfort care is the route to go- reached out to Palliative team for follow up discussion on [address] and family meeting  10/14/22  Palliative team spoke with patient who today stated otherwise that she wants to do everything   Also Neuropsychiatry had evaluated patient and deemed no capacity, hence Palliative team has arranged a family meeting for Monday      10/17/22:  Per report, goals of care discussion was done and consensus is that patient will continue therapeutic treatment, will not transition to hospice at this point

## 2022-10-18 NOTE — ASSESSMENT & PLAN NOTE
· TSH elevated and fT4 low  · Endocrinology consulted - levothyroxine timing was adjusted to 2pm per Endocrinology recs (4 hours after tube feeds have stopped running)  · Repeat TFTs in 1 week per Endocrine Advancement Flap (Single) Text: The defect edges were debeveled with a #15 scalpel blade.  Given the location of the defect and the proximity to free margins a single advancement flap was deemed most appropriate.  Using a sterile surgical marker, an appropriate advancement flap was drawn incorporating the defect and placing the expected incisions within the relaxed skin tension lines where possible.    The area thus outlined was incised deep to adipose tissue with a #15 scalpel blade.  The skin margins were undermined to an appropriate distance in all directions utilizing iris scissors.

## 2022-10-18 NOTE — ASSESSMENT & PLAN NOTE
· Patient has waxing waning mental status   · Cognition and mental status issues multifactorial in nature stemming from prolonged illness, numerous hospitalizations, chronic malnutrition, and chronic/recurrent infections    · Monitor clinically  · Delirium precautions  · Neuropsych consulted for capacity eval-patient does not have capacity per neuropsych  · Given history of bipolar disorder, psychiatric consult requested to ensure this has been optimized and is not contributing to current presentation especially has patient has not been able to follow-up outpatient

## 2022-10-18 NOTE — ASSESSMENT & PLAN NOTE
· 10/11/22 afternoon and rapid response was called for fall  -as per RR note and per patient, she was leaning forward , getting gou tof the bed  to grab a pillow, her leg got caught  and she fell  -denies head strike, loss of consciousness, pain, any lightheadaness, diaphoresis before or after fall  -patient moving all extremities after  Fall  - vital signs stable  -patient was placed on soft C-collar and CT head as well as cervical spine was obtained stat-result showed no fracture or subluxation  -fall precaution  -d/w brother Bernalillotrae Moncada and updated her status/incident of fall   Also [facility]

## 2022-10-18 NOTE — ASSESSMENT & PLAN NOTE
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

## 2022-10-18 NOTE — ASSESSMENT & PLAN NOTE
Malnutrition Findings:   Adult Malnutrition type: Chronic illness  Adult Degree of Malnutrition: Other severe protein calorie malnutrition  Malnutrition Characteristics: Weight loss, Muscle loss       360 Statement: Severe/Chronic malnutrition r/t condition, as evidenced by 4 8% wt loss x < 1 week (9/2/22: 125#, 9/5/22: 119#), and severe muscle mass depletion (temples/clavicle)  Treated with liberalized diet and nutrition supplements, possibly nutrition support pending goals of care    BMI Findings: Body mass index is 18 71 kg/m²     · Continue tube feeds

## 2022-10-18 NOTE — ASSESSMENT & PLAN NOTE
· Recent hx of staph epi bacteremia in 7/2022, presumed due to picc line  · Recurrence 1/2 set staph epi, 2nd set without growth  Was treated with intravenous vancomycin for presumed line infection  · S/p TAVIA (9/9) no evidence of vegetation  · Blood cultures had been negative however repeated again due to isolated fever which are negative to date    · PICC and TPN discontinued, now has PEG tube  · Monitor off abx-monitoring for fevers, up trending leukocytosis  · Leukocytosis stable

## 2022-10-18 NOTE — PROGRESS NOTES
[facility] [facility]  Progress Note - Spring Iverson 1945, 76 y o  female MRN: [id number]  Unit/Bed#: [facility] 323-01 Encounter: [id number]  Primary Care Provider: Perry Barcenas MD   Date and time admitted to hospital: 8/30/2022 11:49 PM    Hyperkalemia  Assessment & Plan    · Nephrology following - giving calcium gluconate, D50/insulin, sodium bicarb given October 5th  · Low-potassium pleasure feed diet  · Change tube feeds to Nephro  · k normal now    Urinary retention  Assessment & Plan  · Has required intermittent catheterizations however now voiding spontaneously  · Continue retention protocol    Hypotension  Assessment & Plan  · Continue midodrine 10 mg t i d     Acute encephalopathy  Assessment & Plan  · Patient has waxing waning mental status   · Cognition and mental status issues multifactorial in nature stemming from prolonged illness, numerous hospitalizations, chronic malnutrition, and chronic/recurrent infections  · Monitor clinically  · Delirium precautions  · Neuropsych consulted for capacity eval-patient does not have capacity per neuropsych  · Given history of bipolar disorder, psychiatric consult requested to ensure this has been optimized and is not contributing to current presentation especially has patient has not been able to follow-up outpatient      Goals of care, counseling/discussion  Assessment & Plan  · Evaluated by palliative care  Wants to continue medical directed treatments with limits of DNR/DNI  · Spoke with patient's brother Christa Munoz on 10/11/22 who is power of  ( but financial power of ), We discussed goals of care  BrotherChrista agrees that we need to address palliative/comfort option again given her failure to thrive  He will discuss with rest of the siblings and they all will discussed with patient  In next few days he will update us wether they  would like to have a meeting with palliative/hospice again      10/13/22 Dakota Arriola  for f/u- no response  Called LUCIA- reji Locke- wife responded- explained to her that I had spoken with Michel Moncada and to see if they had a family meeting  Also  patient did express that she feels comfort care is the route to go- reached out to Palliative team for follow up discussion on [address] and family meeting  10/14/22  Palliative team spoke with patient who today stated otherwise that she wants to do everything  Also Neuropsychiatry had evaluated patient and deemed no capacity, hence Palliative team has arranged a family meeting for Monday      10/17/22:  Per report, goals of care discussion was done and consensus is that patient will continue therapeutic treatment, will not transition to hospice at this point    Acute on chronic anemia  Assessment & Plan  · 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  · S/p recent EGD in 7/22 revealing: Residual marginal ulcer of the gastrojejunostomy anastomosis with improved size  · S/p EGD 9/2 revealing: Large, cratered ulcer in the gastrojejunal anastomosis   · Status post 1 unit PRBCs this admission  · Per d/w nephrology, requsted to switch from PPI to pepcid given c/f eosinophilia (will check with GI tmrw regarding timing of this)  ·     Bacteremia  Assessment & Plan  · Recent hx of staph epi bacteremia in 7/2022, presumed due to picc line  · Recurrence 1/2 set staph epi, 2nd set without growth  Was treated with intravenous vancomycin for presumed line infection  · S/p TAVIA (9/9) no evidence of vegetation  · Blood cultures had been negative however repeated again due to isolated fever which are negative to date    · PICC and TPN discontinued, now has PEG tube  · Monitor off abx-monitoring for fevers, up trending leukocytosis  · Leukocytosis stable    Moderate protein-calorie malnutrition (HCC)  Assessment & Plan  Malnutrition Findings:   Adult Malnutrition type: Chronic illness  Adult Degree of Malnutrition: Other severe protein calorie malnutrition  Malnutrition Characteristics: Weight loss, Muscle loss       360 Statement: Severe/Chronic malnutrition r/t condition, as evidenced by 4 8% wt loss x < 1 week (9/2/22: 125#, 9/5/22: 119#), and severe muscle mass depletion (temples/clavicle)  Treated with liberalized diet and nutrition supplements, possibly nutrition support pending goals of care    BMI Findings: Body mass index is 18 71 kg/m²  · Continue tube feeds    Fall  Assessment & Plan  · 10/11/22 afternoon and rapid response was called for fall  -as per RR note and per patient, she was leaning forward , getting gou tof the bed  to grab a pillow, her leg got caught  and she fell  -denies head strike, loss of consciousness, pain, any lightheadaness, diaphoresis before or after fall  -patient moving all extremities after  Fall  - vital signs stable  -patient was placed on soft C-collar and CT head as well as cervical spine was obtained stat-result showed no fracture or subluxation  -fall precaution  -d/w brother Amilcar Lang and updated her status/incident of fall   Also discussed regardign GOC    Diarrhea  Assessment & Plan  · Recent history of C diff colitis  · Treated with prophylactic dose of p o  Vancomycin while on antibiotics   · Patient reported some mild abdominal pain this morning but resolved     Ambulatory dysfunction  Assessment & Plan  · Will need rehab on discharge      NOAH (acute kidney injury) ([facility] [address] )  Assessment & Plan  · Renal function appears to have stabilized around creatinine of 1 5-1 7  · Morning labs pending patient initially refused but is now agreeable  · Possibly new baseline per Nephrology  ·     H/O bariatric surgery - bypass  Assessment & Plan  · History of Savage-en-Y gastric bypass  Has had significant issues over the last 6 months or so with chronic malnutrition and an anastomotic ulcerations    When last seen by bariatric surgery, it was recommended to continue on prolonged TPN for nutritional optimization with possible revision of the Savage-en-Y in the future  · Given her recurrence PICC line infections and bacteremia, TPN has been discontinued  · Continue tube feeds for nutritional optimization  Patient has been tolerating tube feeds without any difficulty  Is also continued on pleasure feeds  · Will need significant improvement before consideration can be given to further surgical intervention      Abnormal CT scan  Assessment & Plan  · Noted on CT C/A/P 9/26  - Focal thickening of the lower portion of the endometrium  While this is not expected in a patient of this age, this appears stable from 2016 suggesting a benign etiology  If there is clinical concern, follow-up ultrasound is recommended  - Small left upper lobe nodules, one of which is new from March 2022  Given the history of smoking, a follow-up chest CT is recommended in 12 months  · This was d/w sister and brother POA over phone by prior provider 9/28    Hyponatremia  Assessment & Plan    Nephrology following   Hyponatremia the setting of poor solute intake with gastric bypass, continue with salt tablets 2 g t i d , minimize free water flushes with tube feeds  Hypothyroidism  Assessment & Plan  · TSH elevated and fT4 low  · Endocrinology consulted - levothyroxine timing was adjusted to 2pm per Endocrinology recs (4 hours after tube feeds have stopped running)  · Repeat TFTs in 1 week per Endocrine    * Sepsis ([facility] [address] )  Assessment & Plan  · Initially met criteria with fever, tachypnea, tachycardia, and leukocytosis on admission  Sepsis secondary to recurrent bacteremia and PICC line infections  · PICC line has been removed and patient had PEG tube placed for nutritional support  · Finished full course of ertapenem for ESBL UTI  · Patient with recurrent fever 10/7/22- Started empiric vancomycin whic was dced  · Discussed with infectious disease     observe off of antibiotic , cultures- sent on 10/9/22- NGTD  · Will check CBC to monitor leukocytosis        VTE Pharmacologic Prophylaxis: VTE Score: 3 Moderate Risk (Score 3-4) - Pharmacological DVT Prophylaxis Ordered: heparin  Patient Centered Rounds: Will discussed with RN  Discussions with Specialists or Other Care Team Provider:  Nephrology    Education and Discussions with Family / Patient: Will call family  Time Spent for Care: 30 minutes  More than 50% of total time spent on counseling and coordination of care as described above  Current Length of Stay: 48 day(s)  Current Patient Status: Inpatient   Certification Statement: The patient will continue to require additional inpatient hospital stay due to As above  Discharge Plan: Anticipate discharge in 48-72 hrs to rehab facility  Code Status: Level 3 - DNAR and DNI    Subjective:   Feels “terrible”, however does not answer any further questions to clarify why or what symptoms she might be having  Objective:     Vitals:   Temp (24hrs), Av °F (37 2 °C), Min:99 °F (37 2 °C), Max:99 °F (37 2 °C)    Temp:  [99 °F (37 2 °C)] 99 °F (37 2 °C)  HR:  [77-82] 77  Resp:  [14-16] 14  BP: (129-130)/(60-63) 130/63  SpO2:  [92 %-94 %] 92 %  Body mass index is 18 71 kg/m²  Input and Output Summary (last 24 hours):   No intake or output data in the 24 hours ending 10/18/22 1721    Physical Exam:   Physical Exam  Vitals reviewed  Constitutional:       General: She is not in acute distress  Appearance: She is not toxic-appearing  Comments: Chronically ill appearing   HENT:      Mouth/Throat:      Mouth: Mucous membranes are moist    Eyes:      General: No scleral icterus  Cardiovascular:      Rate and Rhythm: Normal rate and regular rhythm  Pulmonary:      Effort: Pulmonary effort is normal  No respiratory distress  Breath sounds: Normal breath sounds  Abdominal:      General: Bowel sounds are normal       Palpations: Abdomen is soft        Comments: G tube in place, no irritation   Musculoskeletal:      Right lower leg: No edema  Left lower leg: No edema  Skin:     General: Skin is warm and dry  Comments: Scars noted on face   Neurological:      Mental Status: She is alert  Psychiatric:      Comments: Calm, cooperative with care            Additional Data:     Labs:  Results from last 7 days   Lab Units 10/17/22  1629   WBC Thousand/uL 12 74*   HEMOGLOBIN g/dL 8 0*   HEMATOCRIT % 25 4*   PLATELETS Thousands/uL 441*   NEUTROS PCT % 55   LYMPHS PCT % 21   MONOS PCT % 7   EOS PCT % 16*     Results from last 7 days   Lab Units 10/17/22  1629   SODIUM mmol/L 132*   POTASSIUM mmol/L 4 2   CHLORIDE mmol/L 100   CO2 mmol/L 28   BUN mg/dL 34*   CREATININE mg/dL 1 77*   ANION GAP mmol/L 4   CALCIUM mg/dL 10 2*   ALBUMIN g/dL 2 1*   TOTAL BILIRUBIN mg/dL 0 41   ALK PHOS U/L 101   ALT U/L 19   AST U/L 28   GLUCOSE RANDOM mg/dL 91         Results from last 7 days   Lab Units 10/18/22  1323 10/18/22  0602 10/18/22  0033 10/17/22  1626 10/16/22  2100 10/16/22  1705 10/16/22  1127 10/16/22  0608 10/15/22  1821 10/15/22  1623 10/15/22  1206 10/15/22  0541   POC GLUCOSE mg/dl 110 103 121 98 102 105 103 113 88 115 89 96               Lines/Drains:  Invasive Devices  Report    Drain  Duration           Gastrostomy/Enterostomy Percutaneous Endoscopic Gastrostomy (PEG) 20 Fr  LUQ 40 days                      Imaging: No pertinent imaging reviewed      Recent Cultures (last 7 days):         Last 24 Hours Medication List:   Current Facility-Administered Medications   Medication Dose Route Frequency Provider Last Rate   • acetaminophen  975 mg Oral Q6H PRN Sherry Mejía PA-C     • albumin human  25 g Intravenous Once Sally Lawler MD     • ALPRAZolam  0 25 mg Oral TID PRN Natanael Cho DO     • bisacodyl  10 mg Rectal Daily PRN Patricia Feliz MD     • clonazePAM  3 mg Oral HS Jovita Mcdermott, DO     • folic acid  1 mg Oral Daily Jovita Mcdermott, DO     • guaiFENesin  600 mg Oral Q12H [address] Sherry Mejía PA-C     • heparin (porcine) 5,000 Units Subcutaneous [facility] Neno Mcintosh, DO     • levothyroxine  125 mcg Oral Q24H [place]lu[place]isin Callum Milton MD     • metoclopramide  10 mg Intravenous Q6H PRN Michell Dickson MD     • midodrine  10 mg Oral TID AC TOLU Orellana     • ondansetron  4 mg Intravenous Q6H PRN Kwan Buckley DO     • oxyCODONE  5 mg Oral Q6H Michell Dickson MD     • [START ON 10/19/2022] pantoprazole  40 mg Oral Early Morning Bertin Alcantar MD     • polyethylene glycol  17 g Per PEG Tube BID Kwan Buckley DO     • senna  2 tablet Oral HS Brittany Ayala MD     • sodium chloride  2 g Oral TID Cande Farr DO     • thiamine  100 mg Oral Daily Jovita Mcdermott DO     • traZODone  150 mg Oral HS Kwan Buckley DO          Today, Patient Was Seen By: Bertin Alcantar    **Please Note: This note may have been constructed using a voice recognition system  **

## 2022-10-18 NOTE — PLAN OF CARE
Problem: DISCHARGE PLANNING  Goal: Discharge to home or other facility with appropriate resources  Description: INTERVENTIONS:  - Identify barriers to discharge w/patient and caregiver  - Arrange for needed discharge resources and transportation as appropriate  - Identify discharge learning needs (meds, wound care, etc )  - Arrange for interpretive services to assist at discharge as needed  - Refer to Case Management Department for coordinating discharge planning if the patient needs post-hospital services based on physician/advanced practitioner order or complex needs related to functional status, cognitive ability, or social support system  10/18/2022 0117 by Davdi Ramirez, RN  Outcome: Progressing  10/18/2022 0117 by David Ramirez, RN  Outcome: Progressing

## 2022-10-18 NOTE — CASE MANAGEMENT
Case Management Discharge Planning Note    Patient name Maria Teresa Nina  Location [address] [place] [address] [facility] [id number]  : 1945 Date 10/18/2022       Current Admission Date: 2022  Current Admission Diagnosis:Sepsis [facility])   Patient Active Problem List    Diagnosis Date Noted   • Hyperkalemia 10/05/2022   • Hypotension 2022   • Urinary retention 2022   • Acute encephalopathy 2022   • Moderate protein-calorie malnutrition ([address] ) 2022   • Bacteremia 2022   • Acute on chronic anemia 2022   • Goals of care, counseling/discussion 2022   • Colitis presumed to be due to infection 2022   • Acute cystitis without hematuria 2022   • Acute kidney insufficiency 2022   • Fall 2022   • Diarrhea 2022   • Sepsis ([address] ) 2022   • Sacral ulcer ([address] ) 2022   • Gram-positive bacteremia 2022   • Severe protein-calorie malnutrition ([address] ) 2022   • Bilateral leg edema 2022   • Ambulatory dysfunction 2022   • Acute blood loss anemia 2022   • NOAH (acute kidney injury) ([address] ) 2022   • Gastric ulcer 2022   • Anemia 2022   • Dyspnea on exertion 2022   • Generalized weakness 2022   • Elevated LFTs 2022   • Hyponatremia 2022   • Abnormal CT scan 2022   • H/O bariatric surgery - bypass 2022   • Nasal congestion 2022   • Bilateral hearing loss 2022   • Fibromyalgia syndrome 2021   • Osteopenia of both forearms 2021   • Shortness of breath 2021   • Acute bronchitis 2021   • Dysphasia 2020   • Epigastric pain 2020   • Hypothyroidism 2020   • Gastroesophageal reflux disease without esophagitis 2020   • Depression 2020   • Fibromyalgia, primary 2020   • Iron deficiency 2020   • Numbness of foot 2020      LOS (days): 48  Geometric Mean LOS (GMLOS) (days): 4 80  Days to GMLOS:-43 4 OBJECTIVE:  Risk of Unplanned Readmission Score: 42 32         Current admission status: Inpatient   [facility]   [facility] [address] [facility], [address] [place] [address] [address] [facility] [address]  Phone: [phone] Fax: [phone]    [place] [address]  Phone: [phone] Fax: [phone]    Primary Care Provider: Cecilia Molina MD    Primary Insurance: [facility]  Secondary Insurance:     DISCHARGE DETAILS:     Additional Comments: s/p Family meeting on 10/18 family requesting rehab closer to [facility] where family resdies   CM submitted referrals to [facility]

## 2022-10-19 NOTE — CONSULTS
Consultation - [address] [facility] LA[facility] [place] 76 y o  female MRN: [id number]  Unit/Bed#: [facility] 323-01 Encounter: [id number]      Chief Complaint:  I am not depressed    History of Present Illness   Physician Requesting Consult: Tameka Vital,*  Reason for Consult / Principal Problem:  Bipolar disorder, diagnosis bipolar 2 disorder    Magi Rockwell is a 76 y o  female with a past medical history of georgia-en-Y gastric bypass with anastomotic ulcers, failure to thrive, anemia, NOAH, staph bacteremia, and acute encephalopathy   The patient states that she is in the hospital because she is having a breakdown but feels good  She states her appetite is decreased but has been this way for a long time  She denies suicidal or homicidal ideation  She denies hallucinations, anxiety, and depression  She lives alone and states she can complete her ALDs without assistance  She states that she cannot recall being in the psychiatric unit and does not recall seen a psychiatrist at this time  According to the PDMP Dr Ever Dangelo saw her a year ago prescribed by clonazepam   She does remember taking clonazepam does not remember the name of the physician who prescribed to her  Today she states that she feels better, she says that sometimes he is depressed secondary to her circumstances  She denies any suicidal thoughts plans or intent she does not have any psychotic symptoms or manic episode           Psychiatric Review Of Systems:  sleep: no  appetite changes: yes  weight changes: yes  energy/anergy: no  interest/pleasure/anhedonia: no  somatic symptoms: no  anxiety/panic: no  kalie: no  guilty/hopeless: no  self injurious behavior/risky behavior: no    Historical Information   Past Psychiatric History:   Unknown  Currently in treatment with primary physician  Past Suicide attempts:  Unknown  Past Violent behavior:  Unknown  Past Psychiatric medication trial:  Trazodone, Xanax,  clonazepam    Substance Abuse History:  Fair alcohol use never use drugs     I have assessed this patient for substance use within the past 12 months     History of IP/OP rehabilitation program:  None  Smoking history: Former smoker  Family Psychiatric History:   None    Social History  Education: some college  Learning Disabilities: None  Marital history: single  Living arrangement, social support: She was living alone  Occupational History: retired  Functioning Relationships: good support system    Other Pertinent History: None    Traumatic History:   Abuse: Denies  Other Traumatic Events: None    Past Medical History:   Diagnosis Date   • Anemia    • Anxiety    • Bipolar disorder ([address] )    • Colon polyp    • Disease of thyroid gland    • Essential hypertension    • Essential tremor    • Fibromyalgia, primary    • GERD (gastroesophageal reflux disease)    • Inflammatory polyarthropathy (HCC)    • Mammogram abnormal    • Pressure injury of skin        Medical Review Of Systems:  Review of Systems - Negative except poor appetite feeling tired, all other systems reviewed were negative    Meds/Allergies   current meds:   Current Facility-Administered Medications   Medication Dose Route Frequency   • acetaminophen (TYLENOL) tablet 975 mg  975 mg Oral Q6H PRN   • ALPRAZolam (XANAX) tablet 0 25 mg  0 25 mg Oral TID PRN   • bisacodyl (DULCOLAX) rectal suppository 10 mg  10 mg Rectal Daily PRN   • clonazePAM (KlonoPIN) tablet 3 mg  3 mg Oral HS   • folic acid (FOLVITE) tablet 1 mg  1 mg Oral Daily   • guaiFENesin (MUCINEX) 12 hr tablet 600 mg  600 mg Oral Q12H RHONA   • heparin (porcine) subcutaneous injection 5,000 Units  5,000 Units Subcutaneous Q8H [address]   • levothyroxine tablet 125 mcg  125 mcg Oral Q24H   • metoclopramide (REGLAN) injection 10 mg  10 mg Intravenous Q6H PRN   • midodrine (PROAMATINE) tablet 10 mg  10 mg Oral TID AC   • ondansetron (ZOFRAN) injection 4 mg  4 mg Intravenous Q6H PRN   • oxyCODONE (ROXICODONE) IR tablet 5 mg  5 mg Oral Q6H • pantoprazole (PROTONIX) EC tablet 40 mg  40 mg Oral Early Morning   • polyethylene glycol (MIRALAX) packet 17 g  17 g Per PEG Tube BID   • senna (SENOKOT) tablet 17 2 mg  2 tablet Oral HS   • sodium chloride tablet 2 g  2 g Oral TID   • thiamine tablet 100 mg  100 mg Oral Daily   • traZODone (DESYREL) tablet 150 mg  150 mg Oral HS     No Known Allergies    Objective   Vital signs in last 24 hours:  Temp:  [97 4 °F (36 3 °C)] 97 4 °F (36 3 °C)  HR:  [77-89] 89  Resp:  [14] 14  BP: (116-130)/(56-63) 116/56      Intake/Output Summary (Last 24 hours) at 10/19/2022 1354  Last data filed at 10/19/2022 0701  Gross per 24 hour   Intake 530 ml   Output --   Net 530 ml       Mental Status Evaluation:  Appearance:  age appropriate and thin & gaunt looking   Behavior:  normal   Speech:  soft   Mood:  euthymic   Affect:  mood-congruent   Language: naming objects and repeating phrases   Thought Process:  goal directed   Associations: intact associations   Thought Content:  normal   Perceptual Disturbances: None   Risk Potential: Suicidal Ideations none, Homicidal Ideations none and Potential for Aggression No   Sensorium:  person and place   Memory:  patient does not answer   Cognition:  patient does not answer   Consciousness:  alert and awake    Attention: attention span appeared shorter than expected for age   Intellect: normal   Fund of Knowledge: awareness of current events: Unable to assess   Insight:  limited   Judgment: limited   Muscle Strength and Tone: Within normal limits   Gait/Station: Unable to assess patient is in bed   Motor Activity: no abnormal movements     Lab Results:    I have personally reviewed all pertinent laboratory/tests results    Labs in last 72 hours:   Recent Labs     10/17/22  1629 10/19/22  0656   WBC 12 74* 9 84   RBC 2 55* 2 36*   HGB 8 0* 7 3*   HCT 25 4* 23 3*   * 445*   RDW 19 4* 19 7*   NEUTROABS 6 99  --    SODIUM 132* 135   K 4 2 4 2    106   CO2 28 23   BUN 34* 36* CREATININE 1 77* 1 84*   GLUC 91 114   CALCIUM 10 2* 10 0   AST 28  --    ALT 19  --    ALKPHOS 101  --    TP 8 4  --    ALB 2 1*  --    TBILI 0 41  --        Code Status: )Level 3 - DNAR and DNI    Assessment/Plan     Assessment:  Bon Abdi is a 76 y o  female with  medical history of georgia-en-Y gastric bypass with anastomotic ulcers, failure to thrive, anemia, NOAH, staph bacteremia, and acute encephalopathy has been evaluated for her bipolar disorder  She cannot recall that she had bipolar disorder, she cannot recall if she ever saw a psychiatrist or no  She knows that she take clonazepam for long time  She states she is not depressed, she denies any suicidal thoughts plans or intention does not have any psychotic symptoms at this time  Diagnosis:  Bipolar disorder 2 by history  Plan:   Continue medical management  Continue trazodone clonazepam as ordered  I do not feel the patient needs another psychotropic medication at this time  Discussed with primary team  No other intervention at this time  I will sign off  Risks, benefits and possible side effects of Medications:   Risks, benefits, and possible side effects of medications explained to patient and patient verbalizes understanding             Po Grimes

## 2022-10-19 NOTE — CONSULTS
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) and severe malnutrition s/p PEJ placement on 09/07 presenting with clogged PEJ tube s/p resolution with soda flush  Patient appears to be doing well overall without any further issues with tube feeds via PEJ this morning  1  Continue to monitor for further signs of PEJ obstruction  2  GI will sign off  Please call with questions or concerns  Rest of care per primary team   Thank you for this consultation  ______________________________________________________________________    HPI: Lisa Osuna is a 40-year-old female with history significant for bipolar disorder, depression, Savage-en-Y gastric bypass (2012) c/b anastomotic ulcer (2019) and severe malnutrition s/p PEJ placement on 09/07 whom we are asked to see in consultation for clogged PEJ tube  Patient was found to have a clogged PEJ tube last evening which resolved following flush with soda  Patient continues to endorse some tenderness to palpation in the abdomen  Per nursing, patient's tube feeds were resumed this morning without any concerns for clogging  REVIEW OF SYSTEMS:  10 point ROS reviewed and negative except otherwise noted in the HPI above       Historical Information   Past Medical History:   Diagnosis Date   • Anemia    • Anxiety    • Bipolar disorder ([facility] [address] )    • Colon polyp    • Disease of thyroid gland    • Essential hypertension    • Essential tremor    • Fibromyalgia, primary    • GERD (gastroesophageal reflux disease)    • Inflammatory polyarthropathy (HCC)    • Mammogram abnormal    • Pressure injury of skin      Past Surgical History:   Procedure Laterality Date   • BREAST BIOPSY Right 2015    benign   • CARPAL TUNNEL RELEASE Bilateral    • COLONOSCOPY     • EGD AND COLONOSCOPY  2014   • GASTRIC BYPASS  2012   • MAMMO NEEDLE LOCALIZATION RIGHT (ALL INC) Right 2012   • MAMMO NEEDLE LOCALIZATION RIGHT (ALL INC) Right 2012   • ULNAR NERVE TRANSPOSITION Right      Social History   Social History     Substance and Sexual Activity   Alcohol Use Yes   • Alcohol/week: 4 0 standard drinks   • Types: 4 Glasses of wine per week    Comment: rare     Social History     Substance and Sexual Activity   Drug Use Never     Social History     Tobacco Use   Smoking Status Former Smoker   • Quit date:    • Years since quittin 8   Smokeless Tobacco Never Used     Family History   Problem Relation Age of Onset   • No Known Problems Mother    • No Known Problems Father    • No Known Problems Sister    • No Known Problems Maternal Grandmother    • No Known Problems Maternal Grandfather    • No Known Problems Paternal Grandmother    • No Known Problems Paternal Grandfather    • No Known Problems Sister    • No Known Problems Sister    • Stomach cancer Maternal Aunt    • No Known Problems Maternal Aunt    • No Known Problems Maternal Aunt    • No Known Problems Maternal Aunt    • No Known Problems Paternal Aunt    • Leukemia Other        Meds/Allergies     Medications Prior to Admission   Medication   • clonazePAM (KlonoPIN) 1 mg tablet   • folic acid (FOLVITE) 1 mg tablet   • levothyroxine 112 mcg tablet   • midodrine (PROAMATINE) 5 mg tablet   • sucralfate (CARAFATE) 1 g tablet   • thiamine (VITAMIN B1) 100 mg tablet   • traZODone (DESYREL) 50 mg tablet     Current Facility-Administered Medications   Medication Dose Route Frequency   • acetaminophen (TYLENOL) tablet 975 mg  975 mg Oral Q6H PRN   • ALPRAZolam (XANAX) tablet 0 25 mg  0 25 mg Oral TID PRN   • bisacodyl (DULCOLAX) rectal suppository 10 mg  10 mg Rectal Daily PRN   • clonazePAM (KlonoPIN) tablet 3 mg  3 mg Oral HS   • folic acid (FOLVITE) tablet 1 mg  1 mg Oral Daily   • guaiFENesin (MUCINEX) 12 hr tablet 600 mg  600 mg Oral Q12H [address]   • heparin (porcine) subcutaneous injection 5,000 Units  5,000 Units Subcutaneous Q8H [address]   • levothyroxine tablet 125 mcg  125 mcg Oral Q24H   • metoclopramide (REGLAN) injection 10 mg  10 mg Intravenous Q6H PRN   • midodrine (PROAMATINE) tablet 10 mg  10 mg Oral TID AC   • ondansetron (ZOFRAN) injection 4 mg  4 mg Intravenous Q6H PRN   • oxyCODONE (ROXICODONE) IR tablet 5 mg  5 mg Oral Q6H   • pantoprazole (PROTONIX) EC tablet 40 mg  40 mg Oral Early Morning   • polyethylene glycol (MIRALAX) packet 17 g  17 g Per PEG Tube BID   • senna (SENOKOT) tablet 17 2 mg  2 tablet Oral HS   • sodium chloride tablet 2 g  2 g Oral TID   • thiamine tablet 100 mg  100 mg Oral Daily   • traZODone (DESYREL) tablet 150 mg  150 mg Oral HS       No Known Allergies      Objective     Blood pressure 116/56, pulse 89, temperature (!) 97 4 °F (36 3 °C), temperature source Oral, resp  rate 14, height 5' 4" (1 626 m), weight 49 4 kg (109 lb), SpO2 91 %  Body mass index is 18 71 kg/m²  Intake/Output Summary (Last 24 hours) at 10/19/2022 0910  Last data filed at 10/19/2022 0701  Gross per 24 hour   Intake 530 ml   Output --   Net 530 ml         PHYSICAL EXAM:    General: frail, chronically ill-appearing, NAD  Eyes: no conjunctival icterus or pallor  CV: RRR, no m/r/g appreciated  Resp: CTAB, normal chest rise  Abdominal: Soft, mildly TTP diffusely, non-distended, +bowel sounds  Extremities: Warm, no deformities, no edema  Skin: No rashes  Neuro: alert and oriented  Psych: Normal affect    Lab Results:   No results displayed because visit has over 200 results            Imaging Studies: I have personally reviewed pertinent imaging studies  CT abdomen pelvis wo contrast    Result Date: 9/23/2022  Narrative: CT ABDOMEN AND PELVIS WITHOUT IV CONTRAST INDICATION:   Abdominal pain, acute, nonlocalized fever, abd pain  COMPARISON:  CT abdomen pelvis August 31, 2022 TECHNIQUE:  CT examination of the abdomen and pelvis was performed without intravenous contrast  Axial, sagittal, and coronal 2D reformatted images were created from the source data and submitted for interpretation  Radiation dose length product (DLP) for this visit:  322 74 mGy-cm   This examination, like all CT scans performed in the [facility], was performed utilizing techniques to minimize radiation dose exposure, including the use of iterative  reconstruction and automated exposure control  Enteric contrast was not administered  FINDINGS: ABDOMEN LOWER CHEST:  Trace bilateral pleural effusions with minimal passive atelectasis LIVER/BILIARY TREE:  Unremarkable  GALLBLADDER:  Two adjacent gallstones measuring 1 3 and 1 0 cm  Gallbladder lumen is mildly distended  SPLEEN:  Unremarkable  PANCREAS:  Unremarkable  ADRENAL GLANDS:  Unremarkable  KIDNEYS/URETERS:  Unremarkable  No hydronephrosis  STOMACH AND BOWEL:  Post Savage-en-Y gastric bypass  Percutaneous jejunostomy tube  No bowel obstruction  Large amount of stool throughout the colon  APPENDIX:  No findings to suggest appendicitis  ABDOMINOPELVIC CAVITY:  No ascites  No pneumoperitoneum  No lymphadenopathy  VESSELS:  Atherosclerotic changes are present  No evidence of aneurysm  PELVIS REPRODUCTIVE ORGANS:  Unremarkable for patient's age  URINARY BLADDER:  Unremarkable  ABDOMINAL WALL/INGUINAL REGIONS:  Unremarkable  OSSEOUS STRUCTURES:  No acute fracture or destructive osseous lesion  Spinal degenerative changes are noted  Impression: 1  Cholelithiasis with mild gallbladder luminal distention  Note is made of patient's elevated alkaline phosphatase    Ultrasound was recently performed (September 21, 2022), and correlation for clinical signs of acute cholecystitis is recommended  2   Large colonic stool burden without bowel obstruction  3   Trace bilateral pleural effusions  Workstation performed: [id number]     CT chest abdomen pelvis w contrast    Result Date: 9/27/2022  Narrative: CT CHEST, ABDOMEN AND PELVIS WITH IV CONTRAST INDICATION:   Occult malignancy SIRS  R/O occult mass or infection  COMPARISON:  9/23/2022, 8/31/2022, 5/24/2022, 1/28/2016 TECHNIQUE: CT examination of the chest, abdomen and pelvis was performed  Axial, sagittal, and coronal 2D reformatted images were created from the source data and submitted for interpretation  Radiation dose length product (DLP) for this visit:  726 32 mGy-cm   This examination, like all CT scans performed in the [facility], was performed utilizing techniques to minimize radiation dose exposure, including the use of iterative  reconstruction and automated exposure control  IV Contrast:  96 mL of iohexol (OMNIPAQUE) Enteric Contrast: Enteric contrast was not administered  FINDINGS: CHEST LUNGS:  There are patchy and groundglass opacities in the upper lobes, likely pulmonary edema  There is mild dependent atelectasis  There is stable pleural-parenchymal scarring at the apices  There are small left upper lobe lung nodules which are stable from 2019 though there is also a a nodule which is new from May 2022 measuring 2 mm on series 3 image 19  There is no tracheal or endobronchial lesion  PLEURA:  There are small bilateral pleural effusions  HEART/GREAT VESSELS: Heart is unremarkable for patient's age  No thoracic aortic aneurysm  MEDIASTINUM AND GAEL:  Unremarkable  CHEST WALL AND LOWER NECK:  Unremarkable  ABDOMEN LIVER/BILIARY TREE:  Unremarkable  GALLBLADDER:  There are gallstone(s) within the gallbladder, without pericholecystic inflammatory changes  SPLEEN:  Unremarkable  PANCREAS:  Unremarkable  ADRENAL GLANDS:  Unremarkable  KIDNEYS/URETERS:  Unremarkable  No hydronephrosis  STOMACH AND BOWEL:  Status post gastric bypass  Bowel is unremarkable  A percutaneous feeding tube is present  APPENDIX:  No findings to suggest appendicitis  ABDOMINOPELVIC CAVITY:  There is trace pelvic ascites  No pneumoperitoneum  No lymphadenopathy  VESSELS:  Unremarkable for patient's age  PELVIS REPRODUCTIVE ORGANS:  There is focal thickening of the lower portion of the endometrium measuring up to 9 mm though this is stable from 2016  URINARY BLADDER:  Unremarkable  ABDOMINAL WALL/INGUINAL REGIONS:  Unremarkable  OSSEOUS STRUCTURES:  No acute fracture or destructive osseous lesion  Impression: 1  Patchy and groundglass opacities in the upper lobes  The appearance is most typical for pulmonary edema though in the setting of systemic inflammatory response syndrome, atypical infection is not excluded  2   Small bilateral pleural effusions and trace ascites  3   Focal thickening of the lower portion of the endometrium  While this is not expected in a patient of this age, this appears stable from 2016 suggesting a benign etiology  If there is clinical concern, follow-up ultrasound is recommended  4   Cholelithiasis  5   Small left upper lobe nodules, one of which is new from March 2022  Given the history of smoking, a follow-up chest CT is recommended in 12 months  The study was marked in EPIC for significant notification  Workstation performed: LILLIAN Varela    Gastroenterology Fellow  [id number]  Available on [employer]  10/19/2022 9:10 AM

## 2022-10-19 NOTE — ASSESSMENT & PLAN NOTE
· 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  · S/p recent EGD in 7/22 revealing: Residual marginal ulcer of the gastrojejunostomy anastomosis with improved size  · S/p EGD 9/2 revealing: Large, cratered ulcer in the gastrojejunal anastomosis   · Status post 1 unit PRBCs this admission  · Per d/w nephrology, requsted to switch from PPI to pepcid given c/f eosinophilia, will need to hold off on this given EGD findings with ulcer for now  ·

## 2022-10-19 NOTE — ASSESSMENT & PLAN NOTE
· Renal function appears to have stabilized around creatinine of 1 5-1 7  · Morning labs pending patient initially refused but is now agreeable  · Possibly new baseline per Nephrology

## 2022-10-19 NOTE — PROGRESS NOTES
NEPHROLOGY PROGRESS NOTE   Gage Javed 76 y o  female MRN: [id number]  Unit/Bed#: [facility] 323-01 Encounter: [id number]  Reason for Consult: NOAH    ASSESSMENT AND PLAN:  NOAH on CKD stage 3, baseline creatinine 0 8 to 1 2 since early 2022, more recently creatinine seems to have plateaued around 1 4 to 1 6 since early September 2022  -creatinine relatively stable 1 8 today  Creatinine seems to have plateaued 1 7 to 1 8 over last one week  -BMP in a m   -status post IV albumin 25 g once today  -GFR likely overestimated given poor muscle mass  -UA earlier this month showed 1+ proteinuria, 4 to 10 RBCs/WBCs   -elevated creatinine suspected to be secondary to UTI, infection issues  -noted peripheral eosinophilia, check urine eosinophils, no new rash, repeat urine electrolytes including urine sodium, urine creatinine   -ideally would like to change Protonix to Pepcid although remains on Protonix due to recent EGD showing GJ anastomosis ulcer      Hyponatremia  -sodium level overall improving 135 today    -suspect in the setting of poor solute intake  -workup includes urine sodium 99, urine osmolality 415, serum osmolality 283  -currently on salt tablet 2 g p o  T i d   -BMP in a [facility] -she gets free water flushes 50 mL Q six hourly with tube feeds      Chronic hypotension, currently on midodrine 10 mg t i d , continue same      Metabolic acidosis,  now remains off bicarb supplements, bicarb level 23     Initial UTI, status post antibiotic  Recent C diff infection, completed C diff prophylaxis  Malnutrition, on tube feeds     Anemia with recent GI bleed, transfuse p r n   For hemoglobin less than seven   Continue to monitor  -recent EGD in September 2022 showed large cratered ulcer in [address] anastomosis, GI follow-up  -iron saturation 17%, recommend to consider iron supplements    Patient was found not to have capacity to make informed medical decisions      Discussed above plan in detail with primary team     SUBJECTIVE:  Patient seen and examined at bedside  Denies nausea, vomiting      OBJECTIVE:  Current Weight: Weight - Scale: 49 4 kg (109 lb)  Vitals:    10/18/22 2333   BP: 116/56   Pulse: 89   Resp: 14   Temp: (!) 97 4 °F (36 3 °C)   SpO2: 91%       Intake/Output Summary (Last 24 hours) at 10/19/2022 1016  Last data filed at 10/19/2022 0701  Gross per 24 hour   Intake 530 ml   Output --   Net 530 ml     Wt Readings from Last 3 Encounters:   10/18/22 49 4 kg (109 lb)   08/09/22 59 6 kg (131 lb 6 4 oz)   08/02/22 52 2 kg (115 lb)     Temp Readings from Last 3 Encounters:   10/18/22 (!) 97 4 °F (36 3 °C) (Oral)   08/09/22 99 3 °F (37 4 °C) (Oral)   07/20/22 98 4 °F (36 9 °C) (Temporal)     BP Readings from Last 3 Encounters:   10/18/22 116/56   08/09/22 130/61   07/20/22 103/57     Pulse Readings from Last 3 Encounters:   10/18/22 89   08/09/22 91   07/20/22 76        Physical Examination:  General:  Lying in bed, no acute distress   Eyes:  No conjunctival pallor present  ENT:  External examination of ears and nose unremarkable  Neck:  No obvious lymphadenopathy appreciated  Respiratory:  Bilateral air entry present  CVS:  S1, S2 present  GI:  Soft, nondistended, active present  CNS:  Active, alert, follows commands  Skin:  No new rash  Musculoskeletal:  No obvious new gross deformity noted    Medications:    Current Facility-Administered Medications:   •  acetaminophen (TYLENOL) tablet 975 mg, 975 mg, Oral, Q6H PRN, Kilo Waite PA-C, 975 mg at 09/26/22 2315  •  ALPRAZolam (XANAX) tablet 0 25 mg, 0 25 mg, Oral, TID PRN, Leeann Galloway DO, 0 25 mg at 09/24/22 1616  •  bisacodyl (DULCOLAX) rectal suppository 10 mg, 10 mg, Rectal, Daily PRN, Aliza Wells MD  •  clonazePAM (KlonoPIN) tablet 3 mg, 3 mg, Oral, HS, Jovita Mcdermott DO, 3 mg at 39/12/99 5020  •  folic acid (FOLVITE) tablet 1 mg, 1 mg, Oral, Daily, Jovita Mcdermott DO, 1 mg at 10/17/22 1058  •  guaiFENesin (MUCINEX) 12 hr tablet 600 mg, 600 mg, Oral, Q12H [address], Vidal Miles PA-C, 600 mg at 10/18/22 1326  •  heparin (porcine) subcutaneous injection 5,000 Units, 5,000 Units, Subcutaneous, Q8H [address], Valeria Huang DO, 5,000 Units at 10/19/22 2457  •  levothyroxine tablet 125 mcg, 125 mcg, Oral, Q24H, Oluwatomisin Germán Tim MD, 125 mcg at 10/18/22 1326  •  metoclopramide (REGLAN) injection 10 mg, 10 mg, Intravenous, Q6H PRN, Aaron Sexton MD  •  midodrine (PROAMATINE) tablet 10 mg, 10 mg, Oral, TID AC, TOLU Orellana, 10 mg at 10/19/22 0644  •  ondansetron (ZOFRAN) injection 4 mg, 4 mg, Intravenous, Q6H PRN, Jovita Mcdermott DO, 4 mg at 09/18/22 1339  •  oxyCODONE (ROXICODONE) IR tablet 5 mg, 5 mg, Oral, Q6H, Aaron Sexton MD, 5 mg at 10/19/22 0421  •  pantoprazole (PROTONIX) EC tablet 40 mg, 40 mg, Oral, Early Morning, Kimberly Johnson MD, 40 mg at 10/19/22 8796  •  polyethylene glycol (MIRALAX) packet 17 g, 17 g, Per PEG Tube, BID, Jovita Mcdermott DO, 17 g at 10/18/22 2034  •  senna (SENOKOT) tablet 17 2 mg, 2 tablet, Oral, HS, Leonor Duran MD, 17 2 mg at 10/18/22 2100  •  sodium chloride tablet 2 g, 2 g, Oral, TID, Amanda David DO, 2 g at 10/18/22 2037  •  thiamine tablet 100 mg, 100 mg, Oral, Daily, Jovita Mcdermott DO, 100 mg at 10/18/22 1326  •  traZODone (DESYREL) tablet 150 mg, 150 mg, Oral, HS, Jovita Mcdermott DO, 150 mg at 10/18/22 2100    Laboratory Results:  Results from last 7 days   Lab Units 10/19/22  0656 10/17/22  1629 10/15/22  1049 10/13/22  1717 10/12/22  1220   WBC Thousand/uL 9 84 12 74* 12 14*  --  10 17*   HEMOGLOBIN g/dL 7 3* 8 0* 7 9*  --  7 4*   HEMATOCRIT % 23 3* 25 4* 26 4*  --  23 2*   PLATELETS Thousands/uL 445* 441* 488*  --  504*   SODIUM mmol/L 135 132* 133* 134* 133*   POTASSIUM mmol/L 4 2 4 2 4 3 4 6 4 2   CHLORIDE mmol/L 106 100 102 103 103   CO2 mmol/L 23 28 26 23 23   BUN mg/dL 36* 34* 35* 34* 35*   CREATININE mg/dL 1 84* 1 77* 1 80* 1 72* 1 74*   CALCIUM mg/dL 10 0 10 2* 9 3 9 4 9 1       CT head wo contrast   Final Result by Germán Gill MD (61/92 0192)      No acute intracranial abnormality  Workstation performed: [id number]         CT spine cervical wo contrast   Final Result by Germán Gill MD (10/11 1654)      No acute cervical spine fracture or traumatic malalignment  Workstation performed: [id number]         CT chest abdomen pelvis w contrast   Final Result by Lopez Weiss MD (09/27 1215)      1  Patchy and groundglass opacities in the upper lobes  The appearance is most typical for pulmonary edema though in the setting of systemic inflammatory response syndrome, atypical infection is not excluded  2   Small bilateral pleural effusions and trace ascites  3   Focal thickening of the lower portion of the endometrium  While this is not expected in a patient of this age, this appears stable from 2016 suggesting a benign etiology  If there is clinical concern, follow-up ultrasound is recommended  4   Cholelithiasis  5   Small left upper lobe nodules, one of which is new from March 2022  Given the history of smoking, a follow-up chest CT is recommended in 12 months  The study was marked in EPIC for significant notification  Workstation performed: [id number]         VAS upper limb venous duplex scan, complete, bilateral   Final Result by Salty Mitchell MD (09/25 2039)      VAS lower limb venous duplex study, complete bilateral   Final Result by Salty Mitchell MD (09/25 2039)      CT abdomen pelvis wo contrast   Final Result by Shikha Dodson MD (09/23 0075)      1  Cholelithiasis with mild gallbladder luminal distention  Note is made of patient's elevated alkaline phosphatase  Ultrasound was recently performed (September 21, 2022), and correlation for clinical signs of acute cholecystitis is recommended  2   Large colonic stool burden without bowel obstruction     3   Trace bilateral pleural effusions  Workstation performed: [id number]         XR chest portable   Final Result by Torito Pittman MD (09/23 1437)      Increased interstitial markings, improved from 9/6/2022, question interstitial edema or infectious/inflammatory  Workstation performed: [id number]         US right upper quadrant with liver dopplers   Final Result by Sheryle Manual, MD (09/22 4573)      Cholelithiasis without findings of acute cholecystitis  Workstation performed: [id number]         XR abdomen 1 view kub   Final Result by Brannon Wilson MD (09/15 0802)      No acute findings  Significant colon stool, correlate clinically for constipation  Workstation performed: [id number]         XR chest portable   Final Result by Analilia Loco DO (09/07 1103)      Right-sided PICC tip projects at the right subclavian vein  Mild bilateral peribronchial thickening, possibly related to chronic bronchitis  No acute infiltrates  Minimal linear atelectasis or scarring in the mid lungs bilaterally  Workstation performed: [id number]         CT abdomen pelvis with contrast   Final Result by Randi Nails MD (24/76 4969)      1  Diffuse wall thickening of the colon with surrounding fat stranding suspicious for colitis i e  infectious or inflammatory  2   Mild circumferential wall thickening of the urinary bladder  Correlate with urinalysis for possible cystitis  3   Cholelithiasis  The study was marked in [facility] for immediate notification  Workstation performed: [id number]             Portions of the record may have been created with voice recognition software  Occasional wrong word or "sound a like" substitutions may have occurred due to the inherent limitations of voice recognition software  Read the chart carefully and recognize, using context, where substitutions have occurred

## 2022-10-19 NOTE — QUICK NOTE
10/19/2022 2:19 PM -  Valencia Macedoamone's chart and case were reviewed by Leigha Cassidy  Mode of review included electronic chart check  Goals of care are clear  Palliative Care  seeing for support  For dispo plan, please review Case Management notes  Palliative care provider will return on 10/24  For urgent issues or any questions/concerns, please notify on-call provider via [facility]  You may also call our answering service 24/7 at   Leigha Cassidy  Palliative and Supportive Care  Clinic/Answering Service: [phone]  You can find me on [employer]!

## 2022-10-19 NOTE — ASSESSMENT & PLAN NOTE
· Patient has waxing waning mental status   · Cognition and mental status issues multifactorial in nature stemming from prolonged illness, numerous hospitalizations, chronic malnutrition, and chronic/recurrent infections    · Monitor clinically  · Delirium precautions  · Neuropsych consulted for capacity eval-patient does not have capacity per neuropsych  · Given history of bipolar disorder, psychiatric consult requested to ensure this has been optimized - his saw patient on 10/19, no changes necessary at this time

## 2022-10-19 NOTE — ASSESSMENT & PLAN NOTE
· Evaluated by palliative care  Wants to continue medical directed treatments with limits of DNR/DNI  · Spoke with patient's brother Bethany Palm on 10/11/22 who is power of  ( but financial power of ), We discussed goals of care  Brother, Bethany Palm agrees that we need to address palliative/comfort option again given her failure to thrive  He will discuss with rest of the siblings and they all will discussed with patient  In next few days he will update us wether they  would like to have a meeting with palliative/hospice again  10/13/22 Called [facility]  for f/u- no response  Called LUCIA- reji Locke- wife responded- explained to her that I had spoken with Bethany Palm and to see if they had a family meeting  Also  patient did express that she feels comfort care is the route to go- reached out to Palliative team for follow up discussion on [address] and family meeting  10/14/22  Palliative team spoke with patient who today stated otherwise that she wants to do everything   Also Neuropsychiatry had evaluated patient and deemed no capacity, hence Palliative team has arranged a family meeting for Monday      10/17/22:  Per report, goals of care discussion was done and consensus is that patient will continue therapeutic treatment, will not transition to hospice at this point

## 2022-10-19 NOTE — PROGRESS NOTES
Nutrition summary and recommendations:      due to continued poor PO intake recommend increase Nepro cyclic TF to 27OJ/FR K79 hours with 125ml free water flush q 4 hours; to provide 1584 kcal; 71g protein, 1388ml total water; change  PO diet to pleasure feed (remove low K+ feature)

## 2022-10-19 NOTE — PLAN OF CARE
Problem: DISCHARGE PLANNING  Goal: Discharge to home or other facility with appropriate resources  Description: INTERVENTIONS:  - Identify barriers to discharge w/patient and caregiver  - Arrange for needed discharge resources and transportation as appropriate  - Identify discharge learning needs (meds, wound care, etc )  - Arrange for interpretive services to assist at discharge as needed  - Refer to Case Management Department for coordinating discharge planning if the patient needs post-hospital services based on physician/advanced practitioner order or complex needs related to functional status, cognitive ability, or social support system  Outcome: Progressing     Problem: SKIN/TISSUE INTEGRITY - ADULT  Goal: Skin Integrity remains intact(Skin Breakdown Prevention)  Description: Assess:  -Perform Gabe assessment every 24h  -Clean and moisturize skin every day/PRN  -Inspect skin when repositioning, toileting, and assisting with ADLS  -Assess under medical devices such as masimo every 4h/PRN  -Assess extremities for adequate circulation and sensation     Bed Management:  -Have minimal linens on bed & keep smooth, unwrinkled  -Change linens as needed when moist or perspiring  -Avoid sitting or lying in one position for more than 2 hours while in bed  -Keep HOB at >30 degrees     Toileting:  -Offer bedside commode  -Assess for incontinence every 2 hours  -Use incontinent care products after each incontinent episode such as wipes/pads    Activity:  -Mobilize patient 2 times a day  -Encourage activity and walks on unit  -Encourage or provide ROM exercises   -Turn and reposition patient every 2 Hours  -Use appropriate equipment to lift or move patient in bed    Skin Care:  -Avoid use of baby powder, tape, friction and shearing, hot water or constrictive clothing  -Relieve pressure over bony prominences using P500  -Do not massage red bony areas  Outcome: Progressing     Problem: Nutrition/Hydration-ADULT  Goal: Nutrient/Hydration intake appropriate for improving, restoring or maintaining nutritional needs  Description: Monitor and assess patient's nutrition/hydration status for malnutrition  Collaborate with interdisciplinary team and initiate plan and interventions as ordered  Monitor patient's weight and dietary intake as ordered or per policy  Utilize nutrition screening tool and intervene as necessary  Determine patient's food preferences and provide high-protein, high-caloric foods as appropriate       INTERVENTIONS:  - Monitor oral intake, urinary output, labs, and treatment plans  - Assess nutrition and hydration status and recommend course of action  - Evaluate amount of meals eaten  - Assist patient with eating if necessary   - Allow adequate time for meals  - Recommend/ encourage appropriate diets, oral nutritional supplements, and vitamin/mineral supplements  - Order, calculate, and assess calorie counts as needed  - Recommend, monitor, and adjust tube feedings and TPN/PPN based on assessed needs  - Assess need for intravenous fluids  - Provide specific nutrition/hydration education as appropriate  - Include patient/family/caregiver in decisions related to nutrition  Outcome: Progressing

## 2022-10-19 NOTE — WOUND OSTOMY CARE
Progress Note - Wound   Andree Roberts 76 y o  female MRN: [id number]  Unit/Bed#: [facility] 323-01 Encounter: [id number]        Assessment Findings:     Patient seen today for wound care follow up visit  Patient is min assist with turning from side to side  Patient has Abdulkadir Kacie in place for urinary incontinence and is incontinent of bowel  Patient is independent with meals but receives tube feed supplementation for poor nutrition intake  Patient is thin and frail in appearance,             1  POA unstageable sacrum- small, oval full thickness wound with 100% moist yellow slough, small amount of drainage  Unable to full appreciate wound depth due to slough tissue       No induration, fluctuance, odor, warmth/temperature differences, redness, or purulence noted to the above noted wounds and skin areas assessed  New dressings applied per orders listed below  Patient tolerated well- no s/s of non-verbal pain or discomfort observed during the encounter  Bedside nurse aware of plan of care  See flow sheets for more detailed assessment findings  Wound care will continue to follow       Skin care Plan:  1-Cleanse sacro-buttocks with soap and water  Apply Triad paste to wound bed and cover with Allevyn foam  Sami with T for treatment  Change everyday and PRN  2-Turn/reposition q2h or when medically stable for pressure re-distribution on skin   3-Elevate heels to offload pressure  4-Moisturize skin daily with skin nourishing cream  5-Ehob cushion in chair when out of bed    6-Hydraguard to bilateral heels BID and PRN  7-P-500 specialty mattress      Wounds:  Wound 07/26/22 Pressure Injury Sacrum Mid (Active)   Wound Image   10/19/22 1113   Wound Description Slough;Pale;Pink 10/19/22 1113   Pressure Injury Stage U 10/19/22 1113   Karyn-wound Assessment Pink;Fragile 10/19/22 1113   Wound Length (cm) 1 8 cm 10/19/22 1113   Wound Width (cm) 0 7 cm 10/19/22 1113   Wound Depth (cm) 0 3 cm 10/19/22 1113   Wound Surface Area (cm^2) 1 26 cm^2 10/19/22 1113   Wound Volume (cm^3) 0 378 cm^3 10/19/22 1113   Calculated Wound Volume (cm^3) 0 38 cm^3 10/19/22 1113   Change in Wound Size % -90 10/19/22 1113   Tunneling 0 cm 10/19/22 1113   Tunneling in depth located at 0 10/19/22 1113   Undermining 0 10/19/22 1113   Undermining is depth extending from 0 10/19/22 1113   Wound Site Closure ALICIA 10/15/22 0017   Drainage Amount Small 10/19/22 1113   Drainage Description Serosanguineous 10/19/22 1113   Non-staged Wound Description Full thickness 10/19/22 1113   Treatments Cleansed 10/19/22 1113   Dressing Foam, Silicon (eg  Allevyn, etc); Moisture barrier 10/19/22 1113   Wound packed? No 10/19/22 1113   Packing- # removed 0 10/19/22 1113   Packing- # inserted 0 10/19/22 1113   Dressing Changed New 10/19/22 1113   Patient Tolerance Tolerated well 10/19/22 1113   Dressing Status Clean;Dry; Intact 10/19/22 1113       Belen Gusman BSN, RN, [facility]

## 2022-10-19 NOTE — ASSESSMENT & PLAN NOTE
· 10/11/22 afternoon and rapid response was called for fall  -as per RR note and per patient, she was leaning forward , getting gou tof the bed  to grab a pillow, her leg got caught  and she fell  -denies head strike, loss of consciousness, pain, any lightheadaness, diaphoresis before or after fall  -patient moving all extremities after  Fall  - vital signs stable  -patient was placed on soft C-collar and CT head as well as cervical spine was obtained stat-result showed no fracture or subluxation  -fall precaution  -d/w brother Bon Crowell and updated her status/incident of fall   Also discussed regardign [address]

## 2022-10-19 NOTE — PROGRESS NOTES
[facility]  Progress Note - Teja Rivera 1945, 76 y o  female MRN: [id number]  Unit/Bed#: [facility] 323-01 Encounter: [id number]  Primary Care Provider: Issac Lyn MD   Date and time admitted to hospital: 8/30/2022 11:49 PM    Hyperkalemia  Assessment & Plan    · Nephrology following - giving calcium gluconate, D50/insulin, sodium bicarb given October 5th  · Low-potassium pleasure feed diet  · Change tube feeds to Nephro  · k normal now    Urinary retention  Assessment & Plan  · Has required intermittent catheterizations however now voiding spontaneously  · Continue retention protocol    Hypotension  Assessment & Plan  · Continue midodrine 10 mg t i d     Acute encephalopathy  Assessment & Plan  · Patient has waxing waning mental status   · Cognition and mental status issues multifactorial in nature stemming from prolonged illness, numerous hospitalizations, chronic malnutrition, and chronic/recurrent infections  · Monitor clinically  · Delirium precautions  · Neuropsych consulted for capacity eval-patient does not have capacity per neuropsych  · Given history of bipolar disorder, psychiatric consult requested to ensure this has been optimized - his saw patient on 10/19, no changes necessary at this time      Goals of care, counseling/discussion  Assessment & Plan  · Evaluated by palliative care  Wants to continue medical directed treatments with limits of DNR/DNI  · Spoke with patient's brother Anita Thomas on 10/11/22 who is power of  ( but financial power of ), We discussed goals of care  Brother, Anita Thomas agrees that we need to address palliative/comfort option again given her failure to thrive  He will discuss with rest of the siblings and they all will discussed with patient  In next few days he will update us wether they  would like to have a meeting with palliative/hospice again      10/13/22 Called [facility]  for f/u- no response  Called SEJAL mendoza Ana Vidal- wife responded- explained to her that I had spoken with [employer] and to see if they had a family meeting  Also  patient did express that she feels comfort care is the route to go- reached out to Palliative team for follow up discussion on [address] and family meeting  10/14/22  Palliative team spoke with patient who today stated otherwise that she wants to do everything  Also Neuropsychiatry had evaluated patient and deemed no capacity, hence Palliative team has arranged a family meeting for Monday      10/17/22:  Per report, goals of care discussion was done and consensus is that patient will continue therapeutic treatment, will not transition to hospice at this point    Acute on chronic anemia  Assessment & Plan  · 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  · S/p recent EGD in 7/22 revealing: Residual marginal ulcer of the gastrojejunostomy anastomosis with improved size  · S/p EGD 9/2 revealing: Large, cratered ulcer in the gastrojejunal anastomosis   · Status post 1 unit PRBCs this admission  · Per d/w nephrology, requsted to switch from PPI to pepcid given c/f eosinophilia, will need to hold off on this given EGD findings with ulcer for now  ·     Bacteremia  Assessment & Plan  · Recent hx of staph epi bacteremia in 7/2022, presumed due to picc line  · Recurrence 1/2 set staph epi, 2nd set without growth  Was treated with intravenous vancomycin for presumed line infection  · S/p TAVIA (9/9) no evidence of vegetation  · Blood cultures had been negative however repeated again due to isolated fever which are negative to date    · PICC and TPN discontinued, now has PEG tube  · Monitor off abx-monitoring for fevers, up trending leukocytosis  · Leukocytosis stable    Moderate protein-calorie malnutrition (HCC)  Assessment & Plan  Malnutrition Findings:   Adult Malnutrition type: Chronic illness  Adult Degree of Malnutrition: Other severe protein calorie malnutrition  Malnutrition Characteristics: Weight loss, Muscle loss       360 Statement: Severe/Chronic malnutrition r/t condition, as evidenced by 4 8% wt loss x < 1 week (9/2/22: 125#, 9/5/22: 119#), and severe muscle mass depletion (temples/clavicle)  Treated with liberalized diet and nutrition supplements, possibly nutrition support pending goals of care    BMI Findings: Body mass index is 18 71 kg/m²  · Continue tube feeds    Fall  Assessment & Plan  · 10/11/22 afternoon and rapid response was called for fall  -as per RR note and per patient, she was leaning forward , getting gou tof the bed  to grab a pillow, her leg got caught  and she fell  -denies head strike, loss of consciousness, pain, any lightheadaness, diaphoresis before or after fall  -patient moving all extremities after  Fall  - vital signs stable  -patient was placed on soft C-collar and CT head as well as cervical spine was obtained stat-result showed no fracture or subluxation  -fall precaution  -d/w brother Abhinav Breaker and updated her status/incident of fall   Also discussed regardign GOC    Diarrhea  Assessment & Plan  · Recent history of C diff colitis  · Treated with prophylactic dose of p o  Vancomycin while on antibiotics     Ambulatory dysfunction  Assessment & Plan  · Will need rehab on discharge      NOAH (acute kidney injury) ([facility] [address] )  Assessment & Plan  · Renal function appears to have stabilized around creatinine of 1 5-1 7  · Morning labs pending patient initially refused but is now agreeable  · Possibly new baseline per Nephrology      H/O bariatric surgery - bypass  Assessment & Plan  · History of Savage-en-Y gastric bypass  Has had significant issues over the last 6 months or so with chronic malnutrition and an anastomotic ulcerations  When last seen by bariatric surgery, it was recommended to continue on prolonged TPN for nutritional optimization with possible revision of the Savage-en-Y in the future    · Given her recurrence PICC line infections and bacteremia, TPN has been discontinued  · Continue tube feeds for nutritional optimization  Patient has been tolerating tube feeds without any difficulty  Is also continued on pleasure feeds  · Will need significant improvement before consideration can be given to further surgical intervention      Abnormal CT scan  Assessment & Plan  · Noted on CT C/A/P 9/26  - Focal thickening of the lower portion of the endometrium  While this is not expected in a patient of this age, this appears stable from 2016 suggesting a benign etiology  If there is clinical concern, follow-up ultrasound is recommended  - Small left upper lobe nodules, one of which is new from March 2022  Given the history of smoking, a follow-up chest CT is recommended in 12 months  · This was d/w sister and brother POA over phone by prior provider 9/28    Hyponatremia  Assessment & Plan    Nephrology following   Hyponatremia the setting of poor solute intake with gastric bypass, continue with salt tablets 2 g t i d , minimize free water flushes with tube feeds  Hypothyroidism  Assessment & Plan  · TSH elevated and fT4 low  · Endocrinology consulted - levothyroxine timing was adjusted to 2pm per Endocrinology recs (4 hours after tube feeds have stopped running)  · Repeat TFTs in 1 week per Endocrine    * Sepsis ([facility] [address] )  Assessment & Plan  · Initially met criteria with fever, tachypnea, tachycardia, and leukocytosis on admission  Sepsis secondary to recurrent bacteremia and PICC line infections  · PICC line has been removed and patient had PEG tube placed for nutritional support  · Finished full course of ertapenem for ESBL UTI  · Patient with recurrent fever 10/7/22- Started empiric vancomycin whic was dced  · Discussed with infectious disease     observe off of antibiotic , cultures- sent on 10/9/22- NGTD  · Will check CBC to monitor leukocytosis        VTE Pharmacologic Prophylaxis: VTE Score: 3 Moderate Risk (Score 3-4) - Pharmacological DVT Prophylaxis Ordered: heparin  Patient Centered Rounds: d/w RN  Discussions with Specialists or Other Care Team Provider: GI, renal reviewed    Education and Discussions with Family / Patient: Updated  (brother) via phone  Time Spent for Care: 30 minutes  More than 50% of total time spent on counseling and coordination of care as described above  Current Length of Stay: 49 day(s)  Current Patient Status: Inpatient   Certification Statement: The patient will continue to require additional inpatient hospital stay due to awaiting rehab  Discharge Plan: Anticipate discharge tomorrow to rehab facility  Code Status: Level 3 - DNAR and DNI    Subjective:   Responds “how dare you” when asked how she is doing  Does not respond other questions, but did tell me to have a nice day as I was leaving  Objective:     Vitals:   Temp (24hrs), Av 4 °F (36 3 °C), Min:97 4 °F (36 3 °C), Max:97 4 °F (36 3 °C)    Temp:  [97 4 °F (36 3 °C)] 97 4 °F (36 3 °C)  HR:  [77-89] 89  Resp:  [14] 14  BP: (116-130)/(56-63) 116/56  SpO2:  [91 %-92 %] 91 %  Body mass index is 18 71 kg/m²  Input and Output Summary (last 24 hours): Intake/Output Summary (Last 24 hours) at 10/19/2022 1542  Last data filed at 10/19/2022 0701  Gross per 24 hour   Intake 530 ml   Output --   Net 530 ml       Physical Exam:   Physical Exam  Vitals reviewed  Constitutional:       General: She is not in acute distress  Appearance: She is not toxic-appearing  Comments: Cachexia   HENT:      Mouth/Throat:      Mouth: Mucous membranes are moist    Eyes:      General: No scleral icterus  Pulmonary:      Effort: Pulmonary effort is normal  No respiratory distress  Abdominal:      General: Bowel sounds are normal       Palpations: Abdomen is soft  Tenderness: There is no abdominal tenderness  Comments: PEG in place, no surrounding erythema   Musculoskeletal:      Right lower leg: No edema  Left lower leg: No edema     Skin: General: Skin is warm and dry  Comments: Scarring on face and neck noted   Neurological:      Mental Status: She is alert  Psychiatric:      Comments: Somewhat irritable, redirectable            Additional Data:     Labs:  Results from last 7 days   Lab Units 10/19/22  0656 10/17/22  1629   WBC Thousand/uL 9 84 12 74*   HEMOGLOBIN g/dL 7 3* 8 0*   HEMATOCRIT % 23 3* 25 4*   PLATELETS Thousands/uL 445* 441*   NEUTROS PCT %  --  55   LYMPHS PCT %  --  21   MONOS PCT %  --  7   EOS PCT %  --  16*     Results from last 7 days   Lab Units 10/19/22  0656 10/17/22  1629   SODIUM mmol/L 135 132*   POTASSIUM mmol/L 4 2 4 2   CHLORIDE mmol/L 106 100   CO2 mmol/L 23 28   BUN mg/dL 36* 34*   CREATININE mg/dL 1 84* 1 77*   ANION GAP mmol/L 6 4   CALCIUM mg/dL 10 0 10 2*   ALBUMIN g/dL  --  2 1*   TOTAL BILIRUBIN mg/dL  --  0 41   ALK PHOS U/L  --  101   ALT U/L  --  19   AST U/L  --  28   GLUCOSE RANDOM mg/dL 114 91         Results from last 7 days   Lab Units 10/19/22  1148 10/19/22  0732 10/19/22  0101 10/18/22  2151 10/18/22  1748 10/18/22  1323 10/18/22  0602 10/18/22  0033 10/17/22  1626 10/16/22  2100 10/16/22  1705 10/16/22  1127   POC GLUCOSE mg/dl 116 115 97 100 91 110 103 121 98 102 105 103               Lines/Drains:  Invasive Devices  Report    Peripheral Intravenous Line  Duration           Peripheral IV 10/19/22 Right;Ventral (anterior) Forearm <1 day          Drain  Duration           Gastrostomy/Enterostomy Percutaneous Endoscopic Gastrostomy (PEG) 20 Fr  LUQ 40 days                      Imaging: No pertinent imaging reviewed      Recent Cultures (last 7 days):         Last 24 Hours Medication List:   Current Facility-Administered Medications   Medication Dose Route Frequency Provider Last Rate   • acetaminophen  975 mg Oral Q6H PRN Mele Duran PA-C     • ALPRAZolam  0 25 mg Oral TID PRN Nakita Sifuentes DO     • bisacodyl  10 mg Rectal Daily PRN Dana Dempsey MD     • clonazePAM  3 mg Oral HS Jovita Mcdremott, DO     • folic acid  1 mg Oral Daily Jovita Mcdermott, DO     • guaiFENesin  600 mg Oral Q12H [address] Nina Fernandez PA-C     • heparin (porcine)  5,000 Units Subcutaneous [facility] Ferna Push, DO     • levothyroxine  125 mcg Oral Q24H [place] Bonita Bateman MD     • metoclopramide  10 mg Intravenous Q6H PRN Kriss Galindo MD     • midodrine  10 mg Oral TID AC TOLU Orellana     • ondansetron  4 mg Intravenous Q6H PRN Kp Sims DO     • oxyCODONE  5 mg Oral Q6H Kriss Galindo MD     • pantoprazole  40 mg Oral Early Morning Dada Jo MD     • polyethylene glycol  17 g Per PEG Tube BID Kp Sims DO     • senna  2 tablet Oral HS Reymundo Multani MD     • sodium chloride  2 g Oral TID Kike Borden DO     • thiamine  100 mg Oral Daily Jovitaaixa Mcdermott, DO     • traZODone  150 mg Oral HS pK Sims DO          Today, Patient Was Seen By: Dada Jo    **Please Note: This note may have been constructed using a voice recognition system  **

## 2022-10-19 NOTE — SOCIAL WORK
LSW met with patient to provide emotional support  Patient chooses to not participate in the discussion and requests that LSW leave        LSW will attempt again on 10/20

## 2022-10-20 NOTE — UTILIZATION REVIEW
Continued Stay Review    Date: 10/20/2022                          Current Patient Class: IP Current Level of Care: MS    HPI:77 y o  female initially admitted on 08/31/2022    Assessment/Plan:   10/19   GI Consult: Pt w/ clogged PEG tube this evening, resolved following flush w/ soda  She continues to endorse sone abdominal tenderness w/ palpation  Pt's TF was resumed this am w/o clogging  Cont to mon for further signs of PEJ obstruction  Behavioral Health Consult: Bipolar disorder 2  Plan: Continue medical management  Continue trazodone clonazepam as ordered  Does not need another psychotropic medication at this time  No other intervention at this time  10/20 Progress Notes: Pt denies n/v/cp/sob  Creatinine up to 1 84 yesterday  No BMP today d/t she is refusing labs  She intermittently refuse labs per nsg  Given IV Albumin yesterday  Cont TF via PEG tube, cont to mon cr  Cont Midodrine, PPI  Pain control prn  Cont PT/OT  Palliative care following  CM working for [place] rehab placement      Vital Signs: /57 (BP Location: Right arm)   Pulse 68   Temp 97 6 °F (36 4 °C) (Oral)   Resp 18   Ht 5' 4" (1 626 m)   Wt 49 4 kg (109 lb)   SpO2 95%   BMI 18 71 kg/m²       Pertinent Labs/Diagnostic Results:       Results from last 7 days   Lab Units 10/19/22  0656 10/17/22  1629 10/15/22  1049   WBC Thousand/uL 9 84 12 74* 12 14*   HEMOGLOBIN g/dL 7 3* 8 0* 7 9*   HEMATOCRIT % 23 3* 25 4* 26 4*   PLATELETS Thousands/uL 445* 441* 488*   NEUTROS ABS Thousands/µL  --  6 99  --          Results from last 7 days   Lab Units 10/19/22  0656 10/17/22  1629 10/15/22  1049 10/13/22  1717   SODIUM mmol/L 135 132* 133* 134*   POTASSIUM mmol/L 4 2 4 2 4 3 4 6   CHLORIDE mmol/L 106 100 102 103   CO2 mmol/L 23 28 26 23   ANION GAP mmol/L 6 4 5 8   BUN mg/dL 36* 34* 35* 34*   CREATININE mg/dL 1 84* 1 77* 1 80* 1 72*   EGFR ml/min/1 73sq m 25 26 26 27   CALCIUM mg/dL 10 0 10 2* 9 3 9 4     Results from last 7 days   Lab Units 10/17/22  1629   AST U/L 28   ALT U/L 19   ALK PHOS U/L 101   TOTAL PROTEIN g/dL 8 4   ALBUMIN g/dL 2 1*   TOTAL BILIRUBIN mg/dL 0 41     Results from last 7 days   Lab Units 10/20/22  1109 10/20/22  0630 10/19/22  1148 10/19/22  0732 10/19/22  0101 10/18/22  2151 10/18/22  1748 10/18/22  1323 10/18/22  0602 10/18/22  0033 10/17/22  1626 10/16/22  2100   POC GLUCOSE mg/dl 128 130 116 115 97 100 91 110 103 121 98 102     Results from last 7 days   Lab Units 10/19/22  0656 10/17/22  1629 10/15/22  1049 10/13/22  1717   GLUCOSE RANDOM mg/dL 114 91 110 81           Results from last 7 days   Lab Units 10/19/22  0656   FERRITIN ng/mL 1,139*     Results from last 7 days   Lab Units 10/19/22  1134   HEP B S AG  Non-reactive   HEP C AB  Non-reactive           Results from last 7 days   Lab Units 10/19/22  1134   SODIUM UR  83   CREATININE UR mg/dL 47 1       Medications:   Scheduled Medications:  clonazePAM, 3 mg, Oral, HS  folic acid, 1 mg, Oral, Daily  guaiFENesin, 600 mg, Oral, Q12H RHONA  heparin (porcine), 5,000 Units, Subcutaneous, Q8H RHONA  levothyroxine, 125 mcg, Oral, Q24H  midodrine, 10 mg, Oral, TID AC  oxyCODONE, 5 mg, Oral, Q6H  pantoprazole, 40 mg, Oral, Early Morning  polyethylene glycol, 17 g, Per PEG Tube, BID  senna, 2 tablet, Oral, HS  sodium chloride, 2 g, Oral, TID  thiamine, 100 mg, Oral, Daily  traZODone, 150 mg, Oral, HS      Continuous IV Infusions: none     PRN Meds:  acetaminophen, 975 mg, Oral, Q6H PRN  ALPRAZolam, 0 25 mg, Oral, TID PRN  bisacodyl, 10 mg, Rectal, Daily PRN  metoclopramide, 10 mg, Intravenous, Q6H PRN  ondansetron, 4 mg, Intravenous, Q6H PRN        Discharge Plan: D    Network Utilization Review Department  ATTENTION: Please call with any questions or concerns to [phone] and carefully listen to the prompts so that you are directed to the right person   All voicemails are confidential   Mary Garcia all requests for admission clinical reviews, approved or denied determinations and any other requests to dedicated fax number below belonging to the campus where the patient is receiving treatment   List of dedicated fax numbers for the Facilities:  [address] [place] [address] DENIALS (Administrative/Medical Necessity) [phone]   [address]  (Maternity/NICU/Pediatrics) [phone]   [address] [phone]   [address] [phone]   [address] [address] [facility] [phone]   [address] [facility] 134 815 [facility] [phone]

## 2022-10-20 NOTE — NURSING NOTE
Patient continues to refuse to be repositioned  Patient currently ordered q6hr BG checks   Reached out to [facility] to make aware of the refusal  SD 10/20/2022

## 2022-10-20 NOTE — SOCIAL WORK
LSW met with patient to continue to provide emotional support  Patient is grateful for the visit today and appreciates the discussion  Patient was able to discuss her discomfort physically and emotionally when she has diarrhea  Patient requests that LSW stop by again tomorrow  I have spent 30 minutes with Patient  today in which greater than 50% of this time was spent in counseling/coordination of care regarding emotional support      Palliative  will follow-up as requested by patient, family, and primary team   Please contact with any specific requests

## 2022-10-20 NOTE — OCCUPATIONAL THERAPY NOTE
Occupational Therapy Progress Note     Patient Name: Crow Black  HHWGJ'N Date: 10/20/2022  Problem List  Principal Problem:    Sepsis [facility])  Active Problems:    Hypothyroidism    Hyponatremia    Abnormal CT scan    H/O bariatric surgery - bypass    NOAH (acute kidney injury) ([facility] [address] )    Ambulatory dysfunction    Diarrhea    Fall    Moderate protein-calorie malnutrition ([facility] [address] )    Bacteremia    Acute on chronic anemia    Goals of care, counseling/discussion    Acute encephalopathy    Hypotension    Urinary retention    Hyperkalemia        10/20/22 7385   OT Last Visit   OT Visit Date 10/20/22   Note Type   Note Type Treatment   Pain Assessment   Pain Assessment Tool FLACC   Effect of Pain on Daily Activities Impacts ability to engage in valued occupations   Hospital Pain Intervention(s) Repositioned; Ambulation/increased activity; Emotional support   Pain Rating: FLACC (Rest) - Face 0   Pain Rating: FLACC (Rest) - Legs 0   Pain Rating: FLACC (Rest) - Activity 0   Pain Rating: FLACC (Rest) - Cry 0   Pain Rating: FLACC (Rest) - Consolability 0   Score: FLACC (Rest) 0   Pain Rating: FLACC (Activity) - Face 1   Pain Rating: FLACC (Activity) - Legs 0   Pain Rating: FLACC (Activity) - Activity 0   Pain Rating: FLACC (Activity) - Cry 0   Pain Rating: FLACC (Activity) - Consolability 0   Score: FLACC (Activity) 1   Restrictions/Precautions   Weight Bearing Precautions Per Order No   Other Precautions Contact/isolation;Cognitive; Bed Alarm;Multiple lines;Telemetry; Fall Risk;Pain   Lifestyle   Autonomy I adls and mobility -i iadls   Reciprocal Relationships supportive family -   Service to Others retired   Intrinsic Gratification gardening   ADL   Where Assessed Edge of bed   [address]  Minimal Assistance   UB Dressing Deficit Setup;Steadying;Verbal cueing;Supervision/safety; Increased time to complete; Thread RUE; Thread LUE;Pull over head;Pull around back   [address]  5  Supervision/Setup Toileting Deficit Setup;Steadying;Supervison/safety; Increased time to complete;Clothing management up;Clothing management down;Perineal hygiene   Toileting Comments When sitting on the toilet, pt with increasing reports of feeling nausea and lightheaded in which pt returned to lying supine in bed w/ /63   Bed Mobility   Supine to Sit 5  Supervision   Additional items Assist x 1;HOB elevated; Increased time required;Verbal cues   Sit to Supine 5  Supervision   Additional items Assist x 1;Bedrails; Increased time required;Verbal cues   Additional Comments Pt performed all bed mobility tasks w/ supervision; @ end of session, pt left lying supine in bed with all functional needs in reach   Transfers   Sit to Stand 4  Minimal assistance   Additional items Assist x 2; Increased time required;Verbal cues   Stand to Sit 4  Minimal assistance   Additional items Assist x 2; Increased time required;Verbal cues   Toilet transfer 3  Moderate assistance   Additional items Assist x 1; Increased time required;Verbal cues;Standard toilet   Additional Comments Pt performed STS with Min A x2 w/ RW for safety/support; pt required Mod A x1 during STS from toilet w/ use of grab bars for safety/support   Functional Mobility   Functional Mobility 3  Moderate assistance   Additional Comments Pt completed functional mobility <> bathroom with Mod A x1 w/ RW for safety and support w/ verbal cues for proper technique, safety, and hand placement   Additional items Rolling walker   Toilet Transfers   Toilet Transfer From Rolling walker   Toilet Transfer Type To and from   Toilet Transfer to Standard toilet   Toilet Transfer Technique Ambulating   Toilet Transfers Moderate assistance   Subjective   Subjective "I am ready let's go!"   Cognition   Overall Cognitive Status Impaired   Arousal/Participation Responsive;Arousable; Cooperative   Attention Attends with cues to redirect   Orientation Level Oriented to person;Oriented to place   Memory Decreased recall of precautions   Following Commands Follows one step commands with increased time or repetition   Comments Pt presents w/ increased motivation today for functional mobility and OOB activity; pleasant and cooperative t/o the session; presents w/ decreased insight/safety awareness t/o,requiring verbal cues for safety and proper technique   Activity Tolerance   Activity Tolerance Patient tolerated treatment well;Patient limited by fatigue;Treatment limited secondary to medical complications (Comment)  (feeling lightheadedness/dizzy)   Medical Staff Made Aware DPT Candance Mantle, RN cleared for OT treatment   Assessment   Assessment Pt is a 77 yo female who actively participated in skilled OT session on 10/20/2022  Pt seen for co-treat with PT to increase safety, decrease fall risk, and maximize functional/occupational performance 2* medical complexity which is a regression from pt's functional baseline  Treatment focused to improve functional transfers with fall prevention strategies, static/dynamic balance, postural/trunk control, proper body mechanics, functional use of b/l UE's, cognitive orientation, safety awareness, and overall increased activity tolerance in ADL/IADL/leisure tasks  Upon arrival, pt found lying supine in bed and was agreeable to OT session  Pt performed supine <> sit with supervision and performed STS with Min A x2 w/ RW for safety/support  Pt completed functional mobility <> bathroom with Mod A x1 w/ RW and completed toilet transfer w/ Mod A x1 w/ use of grab bars for support  Pt performed posterior and perineal hygiene care in seated position w/ supervision and performed UB dressing tasks w/ Min A in a seated position  Pt w/ increased reports of lightheadedness/dizziness/nausea on toilet and was returned to supine position @ end of session with /63  At the end of the session, pt was left lying supine in bed with all functional needs in reach   Pt demonstrates gradual functional improvements towards OT goals however continues to be functioning below baseline and is still limited by the following limitations/impairments which were addressed through skilled instruction: generalized weakness, cognition, balance, endurance/activity tolerance, postural/trunk control, strength, pain, and safety awareness  At this time, recommend discharge to post-acute rehab when medically stable  The patient's raw score on the AM-PAC Daily Activity inpatient short form is 15, standardized score is 34 69, less than 39 4  Patients at this level are likely to benefit from discharge to post-acute rehabilitation services  Please refer to the recommendation of the Occupational Therapist for safe discharge planning  Established OT goals will be continued 1-2/wk to address immediate acute care needs and underlying performance skills to maximize occupational performance and safety to return to [facility]  Plan   Treatment Interventions ADL retraining;Functional transfer training;UE strengthening/ROM; Endurance training;Cognitive reorientation;Patient/family training;Equipment evaluation/education; Compensatory technique education;Continued evaluation; Energy conservation; Activityengagement   Goal Expiration Date 11/16/22   OT Treatment Day 8   OT Frequency 1-2x/wk   Recommendation   OT Discharge Recommendation Post acute rehabilitation services   AM-[facility] Daily Activity Inpatient   Lower Body Dressing 2   Bathing 2   Toileting 2   Upper Body Dressing 3   Grooming 3   Eating 3   Daily Activity Raw Score 15   Daily Activity Standardized Score (Calc for Raw Score >=11) 34 69   AM-[facility] Applied Cognition Inpatient   Following a Speech/Presentation 2   Understanding Ordinary Conversation 4   Taking Medications 2   Remembering Where Things Are Placed or Put Away 3   Remembering List of 4-5 Errands 2   Taking Care of Complicated Tasks 2   Applied Cognition Raw Score 15   Applied Cognition Standardized Score 33 54      Kartik Matthews MS, OTR/L

## 2022-10-20 NOTE — PLAN OF CARE
Problem: PHYSICAL THERAPY ADULT  Goal: Performs mobility at highest level of function for planned discharge setting  See evaluation for individualized goals  Description:    Equipment Recommended:  (RW)       See flowsheet documentation for full assessment, interventions and recommendations  Outcome: Progressing  Note: Prognosis: Fair  Problem List: Decreased strength, Decreased range of motion, Decreased endurance, Impaired balance, Decreased mobility, Decreased coordination, Decreased cognition, Impaired judgement, Decreased safety awareness, Impaired sensation, Decreased skin integrity, Pain  Assessment: Pt seen for PT session w/ focus on gait training, bed mobility training, + t/f training  Pt motivated to ambulate w/ PT today + to go to the bathroom  Pt did report dizziness w/ sitting on the toilet, potentially due to the fact that she rarely gets out of bed  Pt encouraged to get OOB to chair daily w/ nursing  Pt requesting to lie back down for now because she feels as though she's going to faint  Continue to recommend rehab  Barriers to Discharge: Inaccessible home environment, Decreased caregiver support     PT Discharge Recommendation: Post acute rehabilitation services    See flowsheet documentation for full assessment

## 2022-10-20 NOTE — PROGRESS NOTES
[address] [facility]  Progress Note - Gage Javed 1945, 76 y o  female MRN: [id number]  Unit/Bed#: [facility] 323-01 Encounter: [id number]  Primary Care Provider: Keon Hodges MD   Date and time admitted to hospital: 8/30/2022 11:49 PM    Hyperkalemia  Assessment & Plan    · Nephrology following - giving calcium gluconate, D50/insulin, sodium bicarb given October 5th  · Low-potassium pleasure feed diet  · Change tube feeds to Nephro  · k normal now    Urinary retention  Assessment & Plan  · Has required intermittent catheterizations however now voiding spontaneously  · Continue retention protocol    Hypotension  Assessment & Plan  · Continue midodrine 10 mg t i d     Acute encephalopathy  Assessment & Plan  · Patient has waxing waning mental status   · Cognition and mental status issues multifactorial in nature stemming from prolonged illness, numerous hospitalizations, chronic malnutrition, and chronic/recurrent infections  · Monitor clinically  · Delirium precautions  · Neuropsych consulted for capacity eval-patient does not have capacity per neuropsych  · Given history of bipolar disorder, psychiatric consult requested to ensure this has been optimized - his saw patient on 10/19, no changes necessary at this time      Goals of care, counseling/discussion  Assessment & Plan  · Evaluated by palliative care  Wants to continue medical directed treatments with limits of DNR/DNI  · Spoke with patient's brother Vahe Martinez on 10/11/22 who is power of  ( but financial power of ), We discussed goals of care  BrotherVahe agrees that we need to address palliative/comfort option again given her failure to thrive  He will discuss with rest of the siblings and they all will discussed with patient  In next few days he will update us wether they  would like to have a meeting with palliative/hospice again      10/13/22 Called Davon  for f/u- no response  Called SEJAL mendoza Damari Hammond- wife responded- explained to her that I had spoken with Tim Lesches and to see if they had a family meeting  Also  patient did express that she feels comfort care is the route to go- reached out to Palliative team for follow up discussion on [address] and family meeting  10/14/22  Palliative team spoke with patient who today stated otherwise that she wants to do everything  Also Neuropsychiatry had evaluated patient and deemed no capacity, hence Palliative team has arranged a family meeting for Monday      10/17/22:  Per report, goals of care discussion was done and consensus is that patient will continue therapeutic treatment, will not transition to hospice at this point    Acute on chronic anemia  Assessment & Plan  · 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  · S/p recent EGD in 7/22 revealing: Residual marginal ulcer of the gastrojejunostomy anastomosis with improved size  · S/p EGD 9/2 revealing: Large, cratered ulcer in the gastrojejunal anastomosis   · Status post 1 unit PRBCs this admission  · Per d/w nephrology, requsted to switch from PPI to pepcid given c/f eosinophilia, will need to hold off on this given EGD findings with ulcer for now  ·     Bacteremia  Assessment & Plan  · Recent hx of staph epi bacteremia in 7/2022, presumed due to picc line  · Recurrence 1/2 set staph epi, 2nd set without growth  Was treated with intravenous vancomycin for presumed line infection  · S/p TAVIA (9/9) no evidence of vegetation  · Blood cultures had been negative however repeated again due to isolated fever which are negative to date    · PICC and TPN discontinued, now has PEG tube  · Monitor off abx-monitoring for fevers, up trending leukocytosis  · Leukocytosis stable    Moderate protein-calorie malnutrition (HCC)  Assessment & Plan  Malnutrition Findings:   Adult Malnutrition type: Chronic illness  Adult Degree of Malnutrition: Other severe protein calorie malnutrition  Malnutrition Characteristics: Weight loss, Muscle loss       360 Statement: Severe/Chronic malnutrition r/t condition, as evidenced by 4 8% wt loss x < 1 week (9/2/22: 125#, 9/5/22: 119#), and severe muscle mass depletion (temples/clavicle)  Treated with liberalized diet and nutrition supplements, possibly nutrition support pending goals of care    BMI Findings: Body mass index is 18 71 kg/m²  · Continue tube feeds    Fall  Assessment & Plan  · 10/11/22 afternoon and rapid response was called for fall  -as per RR note and per patient, she was leaning forward , getting gou tof the bed  to grab a pillow, her leg got caught  and she fell  -denies head strike, loss of consciousness, pain, any lightheadaness, diaphoresis before or after fall  -patient moving all extremities after  Fall  - vital signs stable  -patient was placed on soft C-collar and CT head as well as cervical spine was obtained stat-result showed no fracture or subluxation  -fall precaution  -d/w brother Ofelia Contreras and updated her status/incident of fall   Also discussed regardign GOC    Diarrhea  Assessment & Plan  · Recent history of C diff colitis  · Treated with prophylactic dose of p o  Vancomycin while on antibiotics     Ambulatory dysfunction  Assessment & Plan  · Will need rehab on discharge      NOAH (acute kidney injury) ([facility] [address] )  Assessment & Plan  · Renal function appears to have stabilized around creatinine of 1 5-1 7  · Patient intermittently refusing labs, trend as able  · Possibly new baseline per Nephrology      H/O bariatric surgery - bypass  Assessment & Plan  · History of Savage-en-Y gastric bypass  Has had significant issues over the last 6 months or so with chronic malnutrition and an anastomotic ulcerations  When last seen by bariatric surgery, it was recommended to continue on prolonged TPN for nutritional optimization with possible revision of the Savage-en-Y in the future    · Given her recurrence PICC line infections and bacteremia, TPN has been discontinued  · Continue tube feeds for nutritional optimization  Patient has been tolerating tube feeds without any difficulty  Is also continued on pleasure feeds  · Will need significant improvement before consideration can be given to further surgical intervention      Abnormal CT scan  Assessment & Plan  · Noted on CT C/A/P 9/26  - Focal thickening of the lower portion of the endometrium  While this is not expected in a patient of this age, this appears stable from 2016 suggesting a benign etiology  If there is clinical concern, follow-up ultrasound is recommended  - Small left upper lobe nodules, one of which is new from March 2022  Given the history of smoking, a follow-up chest CT is recommended in 12 months  · This was d/w sister and brother POA over phone by prior provider 9/28    Hyponatremia  Assessment & Plan    Nephrology following   Hyponatremia the setting of poor solute intake with gastric bypass, continue with salt tablets 2 g t i d , minimize free water flushes with tube feeds  Hypothyroidism  Assessment & Plan  · TSH elevated and fT4 low  · Endocrinology consulted - levothyroxine timing was adjusted to 2pm per Endocrinology recs (4 hours after tube feeds have stopped running)  · Repeat TFTs in 1 week per Endocrine    * Sepsis ([facility] [address] )  Assessment & Plan  · Initially met criteria with fever, tachypnea, tachycardia, and leukocytosis on admission  Sepsis secondary to recurrent bacteremia and PICC line infections  · PICC line has been removed and patient had PEG tube placed for nutritional support  · Finished full course of ertapenem for ESBL UTI  · Patient with recurrent fever 10/7/22- Started empiric vancomycin whic was dced  · Discussed with infectious disease  observe off of antibiotic , cultures- sent on 10/9/22- NGTD          VTE Pharmacologic Prophylaxis: VTE Score: 3 Moderate Risk (Score 3-4) - Pharmacological DVT Prophylaxis Ordered: heparin      Patient Centered Rounds: RN  Discussions with Specialists or Other Care Team Provider: * notes reviewed e    Education and Discussions with Family / Patient: No clinical update  Time Spent for Care: 20 minutes  More than 50% of total time spent on counseling and coordination of care as described above  Current Length of Stay: 50 day(s)  Current Patient Status: Inpatient   Certification Statement: The patient will continue to require additional inpatient hospital stay due to Awaiting rehab placement  Discharge Plan: Anticipate discharge tomorrow to rehab facility  Code Status: Level 3 - DNAR and DNI    Subjective:   No acute concerns    Objective:     Vitals:   Temp (24hrs), Av 9 °F (36 6 °C), Min:97 6 °F (36 4 °C), Max:98 1 °F (36 7 °C)    Temp:  [97 6 °F (36 4 °C)-98 1 °F (36 7 °C)] 97 6 °F (36 4 °C)  HR:  [68-71] 68  Resp:  [18-30] 18  BP: ()/(55-57) 114/57  SpO2:  [95 %] 95 %  Body mass index is 18 71 kg/m²  Input and Output Summary (last 24 hours): Intake/Output Summary (Last 24 hours) at 10/20/2022 1951  Last data filed at 10/20/2022 0609  Gross per 24 hour   Intake 450 ml   Output 700 ml   Net -250 ml       Physical Exam:   Physical Exam  Vitals reviewed  Constitutional:       General: She is not in acute distress  Appearance: She is not toxic-appearing  Comments: Cachexia, scarring noted on face and neck   HENT:      Mouth/Throat:      Mouth: Mucous membranes are moist    Eyes:      General: No scleral icterus  Cardiovascular:      Rate and Rhythm: Normal rate and regular rhythm  Pulmonary:      Effort: Pulmonary effort is normal  No respiratory distress  Abdominal:      General: Bowel sounds are normal       Palpations: Abdomen is soft  Tenderness: There is no abdominal tenderness  Comments: PEG in place, no surrounding erythema   Musculoskeletal:      Right lower leg: No edema  Left lower leg: No edema  Skin:     General: Skin is warm and dry     Neurological:      Mental Status: She is alert  Psychiatric:         Mood and Affect: Mood normal          Behavior: Behavior normal           Additional Data:     Labs:  Results from last 7 days   Lab Units 10/19/22  0656 10/17/22  1629   WBC Thousand/uL 9 84 12 74*   HEMOGLOBIN g/dL 7 3* 8 0*   HEMATOCRIT % 23 3* 25 4*   PLATELETS Thousands/uL 445* 441*   NEUTROS PCT %  --  55   LYMPHS PCT %  --  21   MONOS PCT %  --  7   EOS PCT %  --  16*     Results from last 7 days   Lab Units 10/19/22  0656 10/17/22  1629   SODIUM mmol/L 135 132*   POTASSIUM mmol/L 4 2 4 2   CHLORIDE mmol/L 106 100   CO2 mmol/L 23 28   BUN mg/dL 36* 34*   CREATININE mg/dL 1 84* 1 77*   ANION GAP mmol/L 6 4   CALCIUM mg/dL 10 0 10 2*   ALBUMIN g/dL  --  2 1*   TOTAL BILIRUBIN mg/dL  --  0 41   ALK PHOS U/L  --  101   ALT U/L  --  19   AST U/L  --  28   GLUCOSE RANDOM mg/dL 114 91         Results from last 7 days   Lab Units 10/20/22  1745 10/20/22  1109 10/20/22  0630 10/19/22  1148 10/19/22  0732 10/19/22  0101 10/18/22  2151 10/18/22  1748 10/18/22  1323 10/18/22  0602 10/18/22  0033 10/17/22  1626   POC GLUCOSE mg/dl 103 128 130 116 115 97 100 91 110 103 121 98               Lines/Drains:  Invasive Devices  Report    Peripheral Intravenous Line  Duration           Peripheral IV 10/19/22 Right;Ventral (anterior) Forearm 1 day          Drain  Duration           Gastrostomy/Enterostomy Percutaneous Endoscopic Gastrostomy (PEG) 20 Fr  LUQ 42 days    External Urinary Catheter <1 day                      Imaging: No pertinent imaging reviewed      Recent Cultures (last 7 days):         Last 24 Hours Medication List:   Current Facility-Administered Medications   Medication Dose Route Frequency Provider Last Rate   • acetaminophen  975 mg Oral Q6H PRN Checo Brito PA-C     • ALPRAZolam  0 25 mg Oral TID PRN Lara Nichols DO     • bisacodyl  10 mg Rectal Daily PRN Merlin Luna, MD     • clonazePAM  3 mg Oral HS Jovita Mcdermott DO     • folic acid  1 mg Oral Daily Jovitakash Mcdermott, DO     • guaiFENesin  600 mg Oral Q12H [address] Mehrdad Marvin PA-C     • heparin (porcine)  5,000 Units Subcutaneous [facility] Marquita Spatz, DO     • levothyroxine  125 mcg Oral Q24H Oluwatomisin Sancho Sarmiento MD     • metoclopramide  10 mg Intravenous Q6H PRN Kimberly Wright MD     • midodrine  10 mg Oral TID AC TOLU Orellana     • ondansetron  4 mg Intravenous Q6H PRN Helena Hernández DO     • oxyCODONE  5 mg Oral Q6H Kimberly Wright MD     • pantoprazole  40 mg Oral Early Morning Clarke Primrose, MD     • polyethylene glycol  17 g Per PEG Tube BID Helena Hernández DO     • senna  2 tablet Oral HS Kristie Butler MD     • sodium chloride  2 g Oral TID Clementine Sanchez, DO     • thiamine  100 mg Oral Daily Jovitaaixa Mcdermott, DO     • traZODone  150 mg Oral HS Helena Hernández DO          Today, Patient Was Seen By: Clarke Primrose    **Please Note: This note may have been constructed using a voice recognition system  **

## 2022-10-20 NOTE — ASSESSMENT & PLAN NOTE
· Renal function appears to have stabilized around creatinine of 1 5-1 7  · Patient intermittently refusing labs, trend as able  · Possibly new baseline per Nephrology

## 2022-10-20 NOTE — ASSESSMENT & PLAN NOTE
· 10/11/22 afternoon and rapid response was called for fall  -as per RR note and per patient, she was leaning forward , getting gou tof the bed  to grab a pillow, her leg got caught  and she fell  -denies head strike, loss of consciousness, pain, any lightheadaness, diaphoresis before or after fall  -patient moving all extremities after  Fall  - vital signs stable  -patient was placed on soft C-collar and CT head as well as cervical spine was obtained stat-result showed no fracture or subluxation  -fall precaution  -d/w brother Andrewstrae Moncada and updated her status/incident of fall   [facility]

## 2022-10-20 NOTE — NURSING NOTE
Patient refused to have VS checked  Patient also refused scheduled nighttime medications calling nursing staff clowns  Attempted to explain reason for scheduled medication but patient refused  Will continue to monitor   SD 10/19/2022

## 2022-10-20 NOTE — PLAN OF CARE
Problem: OCCUPATIONAL THERAPY ADULT  Goal: Performs self-care activities at highest level of function for planned discharge setting  See evaluation for individualized goals  Description: Treatment Interventions: ADL retraining, Functional transfer training, Endurance training, UE strengthening/ROM, Cognitive reorientation, Patient/family training, Equipment evaluation/education, Compensatory technique education, Continued evaluation, Energy conservation, Activityengagement          See flowsheet documentation for full assessment, interventions and recommendations  Outcome: Progressing  Note: Limitation: Decreased ADL status, Decreased endurance, Decreased self-care trans, Decreased high-level ADLs  Prognosis: Fair  Assessment: Pt is a 75 yo female who actively participated in skilled OT session on 10/20/2022  Pt seen for co-treat with PT to increase safety, decrease fall risk, and maximize functional/occupational performance 2* medical complexity which is a regression from pt's functional baseline  Treatment focused to improve functional transfers with fall prevention strategies, static/dynamic balance, postural/trunk control, proper body mechanics, functional use of b/l UE's, cognitive orientation, safety awareness, and overall increased activity tolerance in ADL/IADL/leisure tasks  Upon arrival, pt found lying supine in bed and was agreeable to OT session  Pt performed supine <> sit with supervision and performed STS with Min A x2 w/ RW for safety/support  Pt completed functional mobility <> bathroom with Mod A x1 w/ RW and completed toilet transfer w/ Mod A x1 w/ use of grab bars for support  Pt performed posterior and perineal hygiene care in seated position w/ supervision and performed UB dressing tasks w/ Min A in a seated position  Pt w/ increased reports of lightheadedness/dizziness/nausea on toilet and was returned to supine position @ end of session with /63   At the end of the session, pt was left lying supine in bed with all functional needs in reach  Pt demonstrates gradual functional improvements towards OT goals however continues to be functioning below baseline and is still limited by the following limitations/impairments which were addressed through skilled instruction: generalized weakness, cognition, balance, endurance/activity tolerance, postural/trunk control, strength, pain, and safety awareness  At this time, recommend discharge to post-acute rehab when medically stable  The patient's raw score on the AM-PAC Daily Activity inpatient short form is 15, standardized score is 34 69, less than 39 4  Patients at this level are likely to benefit from discharge to post-acute rehabilitation services  Please refer to the recommendation of the Occupational Therapist for safe discharge planning  Established OT goals will be continued 1-2/wk to address immediate acute care needs and underlying performance skills to maximize occupational performance and safety to return to OF       OT Discharge Recommendation: Post acute rehabilitation services

## 2022-10-20 NOTE — ASSESSMENT & PLAN NOTE
· Evaluated by palliative care  Wants to continue medical directed treatments with limits of DNR/DNI  · Spoke with patient's brother Ryder Contreras on 10/11/22 who is power of  ( but financial power of ), We discussed goals of care  Brother, Ryder Contreras agrees that we need to address palliative/comfort option again given her failure to thrive  He will discuss with rest of the siblings and they all will discussed with patient  In next few days he will update us wether they  would like to have a meeting with palliative/hospice again  10/13/22 Called [facility]on  for f/u- no response  Called LUCIA- reji Locke- wife responded- explained to her that I had spoken with Ryder Contreras and to see if they had a family meeting  Also  patient did express that she feels comfort care is the route to go- reached out to Palliative team for follow up discussion on [address] and family meeting  10/14/22  Palliative team spoke with patient who today stated otherwise that she wants to do everything   Also Neuropsychiatry had evaluated patient and deemed no capacity, hence Palliative team has arranged a family meeting for Monday      10/17/22:  Per report, goals of care discussion was done and consensus is that patient will continue therapeutic treatment, will not transition to hospice at this point

## 2022-10-20 NOTE — PHYSICAL THERAPY NOTE
Physical Therapy Treatment Note    Patient's Name: Arcadio Williamson  : 1945     10/20/22 0954   PT Last Visit   PT Visit Date 10/20/22   Note Type   Note Type Treatment   Pain Assessment   Pain Assessment Tool FLACC   Pain Rating: FLACC (Rest) - Face 0   Pain Rating: FLACC (Rest) - Legs 0   Pain Rating: FLACC (Rest) - Activity 0   Pain Rating: FLACC (Rest) - Cry 0   Pain Rating: FLACC (Rest) - Consolability 0   Score: FLACC (Rest) 0   Pain Rating: FLACC (Activity) - Face 0   Pain Rating: FLACC (Activity) - Legs 0   Pain Rating: FLACC (Activity) - Activity 0   Pain Rating: FLACC (Activity) - Cry 0   Pain Rating: FLACC (Activity) - Consolability 0   Score: FLACC (Activity) 0   Restrictions/Precautions   Weight Bearing Precautions Per Order No   Other Precautions Contact/isolation;Cognitive; Bed Alarm;Multiple lines; Fall Risk   General   Chart Reviewed Yes   Additional Pertinent History Pt c/o dizziness w/ sitting on the toilet  Pt returned to supine at her request + /63, HR 91  Pt educated on importance of sitting out of bed daily w/ nursing staff  Response to Previous Treatment Patient unable to report, no changes reported from family or staff   Family/Caregiver Present No   Subjective   Subjective "I feel like I made two friends "   Bed Mobility   Supine to Sit 5  Supervision   Additional items HOB elevated; Increased time required   Sit to Supine 5  Supervision   Additional items Increased time required;LE management   Additional Comments Pt greeted in supine  Transfers   Sit to Stand 4  Minimal assistance   Additional items Assist x 2; Increased time required;Verbal cues   Stand to Sit 4  Minimal assistance   Additional items Assist x 2; Increased time required;Verbal cues   Toilet transfer 3  Moderate assistance   Additional items Assist x 1; Increased time required;Verbal cues;Standard toilet   Additional Comments RW   Ambulation/Elevation   Gait pattern Excessively slow; Short stride; Inconsistent beth   Gait Assistance 3  Moderate assist  (Minx1 first 10', ModAx1 2nd 10')   Additional items Assist x 1;Verbal cues; Tactile cues   Assistive Device Rolling walker   Distance 10'x2   Balance   Static Sitting Fair   Dynamic Sitting Fair -   Static Standing Poor +   Dynamic Standing Poor   Ambulatory Poor  (RW)   Endurance Deficit   Endurance Deficit Yes   Endurance Deficit Description weakness, fatigue, dizziness   Activity Tolerance   Activity Tolerance Patient limited by fatigue;Treatment limited secondary to medical complications (Comment)  (dizziness)   Medical Staff Made Aware OT CHITO [address]   Nurse Made Aware yes - cleared + updated   Assessment   Prognosis Fair   Problem List Decreased strength;Decreased range of motion;Decreased endurance; Impaired balance;Decreased mobility; Decreased coordination;Decreased cognition; Impaired judgement;Decreased safety awareness; Impaired sensation;Decreased skin integrity;Pain   Assessment Pt seen for PT session w/ focus on gait training, bed mobility training, + t/f training  Pt motivated to ambulate w/ PT today + to go to the bathroom  Pt did report dizziness w/ sitting on the toilet, potentially due to the fact that she rarely gets out of bed  Pt encouraged to get OOB to chair daily w/ nursing  Pt requesting to lie back down for now because she feels as though she's going to faint  Continue to recommend rehab  Barriers to Discharge Inaccessible home environment;Decreased caregiver support   Goals   Patient Goals go to the bathroom   PT Treatment Day 2   Plan   Treatment/Interventions Functional transfer training;LE strengthening/ROM; Therapeutic exercise; Endurance training;Patient/family training;Equipment eval/education; Bed mobility;Gait training; Compensatory technique education;Spoke to nursing;OT  (balance training)   Progress Slow progress, multiple refusals   PT Frequency 1-2x/wk   Recommendation   PT Discharge Recommendation Post acute rehabilitation services   AM-PAC Basic Mobility Inpatient   Turning in Bed Without Bedrails 3   Lying on Back to Sitting on Edge of Flat Bed 3   Moving Bed to Chair 2   Standing Up From Chair 2   Walk in Room 2   Climb 3-5 Stairs 1   Basic Mobility Inpatient Raw Score 13   Basic Mobility Standardized Score 33 99   Highest Level Of Mobility   -HLM Goal 4: Move to chair/commode   JH-HLM Achieved 6: Walk 10 steps or more   Education   Education Provided Mobility training;Home exercise program   Patient Reinforcement needed   End of Consult   Patient Position at End of Consult Supine; All needs within reach  (all lines in tact)     Tello Plata, PT, DPT

## 2022-10-20 NOTE — PROGRESS NOTES
NEPHROLOGY PROGRESS NOTE   Cayetano Barakat 76 y o  female MRN: [id number]  Unit/Bed#: [facility] 323-01 Encounter: [id number]  Reason for Consult: NOAH    ASSESSMENT AND PLAN:  NOAH on CKD stage 3, baseline creatinine 0 8 to 1 2 since early 2022, more recently creatinine seems to have plateaued around 1 4 to 1 6 since early September 2022  -creatinine relatively stable 1 8  yesterday  No BMP available today  Creatinine seems to have plateaued 1 7 to 1 8 over last one week  -BMP in a m   -status post IV albumin yesterday  -GFR likely overestimated given poor muscle mass  -UA earlier this month showed 1+ proteinuria, 4 to 10 RBCs/WBCs   -elevated creatinine suspected to be secondary to UTI, infection issues  Another differential remains AIN  -noted peripheral eosinophilia, urine eosinophil 5%, no new rash  -FeNa 2 4%  -ideally would like to change Protonix to Pepcid although remains on Protonix due to recent EGD showing GJ anastomosis ulcer  Addendum:  Discussed with floor nurse, she has refused labs today  She has been intermittently refusing labs      Hyponatremia  -sodium level overall improving 135 yesterday  No BMP available today   -suspect in the setting of poor solute intake  -workup includes urine sodium 99, urine osmolality 415, serum osmolality 283  -currently on salt tablet 2 g p o  T i d   -BMP in a m  Jean Mratin -she gets free water flushes 50 mL Q six hourly with tube feeds      Chronic hypotension, currently on midodrine 10 mg t i d , continue same      Metabolic acidosis,  now remains off bicarb supplements, bicarb level 23     Initial UTI, status post antibiotic  Recent C diff infection, completed C diff prophylaxis  Malnutrition, on tube feeds     Anemia with recent GI bleed, transfuse p r n   For hemoglobin less than seven   Continue to monitor  -recent EGD in September 2022 showed large cratered ulcer in [address] anastomosis, GI follow-up  -iron saturation 17%     Patient was found not to have capacity to make informed medical decisions  Status post gastric bypass eventual anastomotic ulcer, severe malnutrition, status post PEG tube placed, GI follow-up      Discussed above plan in detail with primary team     SUBJECTIVE:  Patient seen and examined at bedside  Denies any nausea vomiting, chest pain or shortness of breath      OBJECTIVE:  Current Weight: Weight - Scale: 49 4 kg (109 lb)  Vitals:    10/20/22 0737   BP: 98/55   Pulse: 71   Resp: (!) 30   Temp: 98 1 °F (36 7 °C)   SpO2: 95%       Intake/Output Summary (Last 24 hours) at 10/20/2022 1416  Last data filed at 10/20/2022 5623  Gross per 24 hour   Intake 450 ml   Output 700 ml   Net -250 ml     Wt Readings from Last 3 Encounters:   10/18/22 49 4 kg (109 lb)   08/09/22 59 6 kg (131 lb 6 4 oz)   08/02/22 52 2 kg (115 lb)     Temp Readings from Last 3 Encounters:   10/20/22 98 1 °F (36 7 °C) (Oral)   08/09/22 99 3 °F (37 4 °C) (Oral)   07/20/22 98 4 °F (36 9 °C) (Temporal)     BP Readings from Last 3 Encounters:   10/20/22 98/55   08/09/22 130/61   07/20/22 103/57     Pulse Readings from Last 3 Encounters:   10/20/22 71   08/09/22 91   07/20/22 76        Physical Examination:  General:  Lying in bed, no acute distress   Eyes:  Mild conjunctival pallor present  ENT:  External examination of ears and nose unremarkable  Neck:  No obvious lymphadenopathy appreciated  Respiratory:  Decreased breath sound at bases  CVS:  Bilateral air entry present  GI:  Soft nondistended, PEG tube present  CNS:  Active, alert, follows commands  Skin:  No new rash  Musculoskeletal:  No obvious new gross deformity noted    Medications:    Current Facility-Administered Medications:   •  acetaminophen (TYLENOL) tablet 975 mg, 975 mg, Oral, Q6H PRN, Jg Olson PA-C, 975 mg at 09/26/22 2315  •  ALPRAZolam (XANAX) tablet 0 25 mg, 0 25 mg, Oral, TID PRN, Ck Hurtado DO, 0 25 mg at 09/24/22 1616  •  bisacodyl (DULCOLAX) rectal suppository 10 mg, 10 mg, Rectal, Daily PRN, Remberto Dangelo MD  •  clonazePAM (KlonoPIN) tablet 3 mg, 3 mg, Oral, HS, Jovitakash Mcdermott, DO, 3 mg at 34/83/38 7028  •  folic acid (FOLVITE) tablet 1 mg, 1 mg, Oral, Daily, Jovitakash Mcdermott, DO, 1 mg at 10/20/22 1036  •  guaiFENesin (MUCINEX) 12 hr tablet 600 mg, 600 mg, Oral, Q12H [facility], Aurelia Encinas PA-C, 600 mg at 10/20/22 1036  •  heparin (porcine) subcutaneous injection 5,000 Units, 5,000 Units, Subcutaneous, Q8H [facility], Matthew Ruiz DO, 5,000 Units at 10/20/22 1324  •  levothyroxine tablet 125 mcg, 125 mcg, Oral, Q24H, Oluwatomisin Kathie Campbell MD, 125 mcg at 10/20/22 1325  •  metoclopramide (REGLAN) injection 10 mg, 10 mg, Intravenous, Q6H PRN, Remberto Dangelo MD  •  midodrine (PROAMATINE) tablet 10 mg, 10 mg, Oral, TID AC, TOLU Orellana, 10 mg at 10/20/22 1046  •  ondansetron (ZOFRAN) injection 4 mg, 4 mg, Intravenous, Q6H PRN, Jovita Mcdermott DO, 4 mg at 09/18/22 1339  •  oxyCODONE (ROXICODONE) IR tablet 5 mg, 5 mg, Oral, Q6H, Remberto Dangelo MD, 5 mg at 10/20/22 1325  •  pantoprazole (PROTONIX) EC tablet 40 mg, 40 mg, Oral, Early Morning, Jan Keyes MD, 40 mg at 10/20/22 0604  •  polyethylene glycol (MIRALAX) packet 17 g, 17 g, Per PEG Tube, BID, Jovita Mcdermott DO, 17 g at 10/19/22 2002  •  senna (SENOKOT) tablet 17 2 mg, 2 tablet, Oral, HS, Stewart Moya MD, 17 2 mg at 10/18/22 2100  •  sodium chloride tablet 2 g, 2 g, Oral, TID, Dylon Landis DO, 2 g at 10/20/22 1042  •  thiamine tablet 100 mg, 100 mg, Oral, Daily, Jovita Mcdermott, DO, 100 mg at 10/20/22 1036  •  traZODone (DESYREL) tablet 150 mg, 150 mg, Oral, HS, Jovita Mcdermott, DO, 150 mg at 10/18/22 2100    Laboratory Results:  Results from last 7 days   Lab Units 10/19/22  0656 10/17/22  1629 10/15/22  1049 10/13/22  1717   WBC Thousand/uL 9 84 12 74* 12 14*  --    HEMOGLOBIN g/dL 7 3* 8 0* 7 9*  --    HEMATOCRIT % 23 3* 25 4* 26 4*  --    PLATELETS Thousands/uL 445* 441* 488*  --    SODIUM mmol/L 135 132* 133* 134*   POTASSIUM mmol/L 4 2 4 2 4 3 4 6   CHLORIDE mmol/L 106 100 102 103   CO2 mmol/L 23 28 26 23   BUN mg/dL 36* 34* 35* 34*   CREATININE mg/dL 1 84* 1 77* 1 80* 1 72*   CALCIUM mg/dL 10 0 10 2* 9 3 9 4       CT head wo contrast   Final Result by Jose Alberto Mejia MD (10/11 1651)      No acute intracranial abnormality  Workstation performed: [id number]         CT spine cervical wo contrast   Final Result by Jose Alberto Mejia MD (10/11 1654)      No acute cervical spine fracture or traumatic malalignment  Workstation performed: [id number]         CT chest abdomen pelvis w contrast   Final Result by Warden Johnnie MD (09/27 1215)      1  Patchy and groundglass opacities in the upper lobes  The appearance is most typical for pulmonary edema though in the setting of systemic inflammatory response syndrome, atypical infection is not excluded  2   Small bilateral pleural effusions and trace ascites  3   Focal thickening of the lower portion of the endometrium  While this is not expected in a patient of this age, this appears stable from 2016 suggesting a benign etiology  If there is clinical concern, follow-up ultrasound is recommended  4   Cholelithiasis  5   Small left upper lobe nodules, one of which is new from March 2022  Given the history of smoking, a follow-up chest CT is recommended in 12 months  The study was marked in EPIC for significant notification  Workstation performed: [id number]         VAS upper limb venous duplex scan, complete, bilateral   Final Result by Leroy Ferrell MD (09/25 2039)      VAS lower limb venous duplex study, complete bilateral   Final Result by Leroy Ferrell MD (09/25 2039)      CT abdomen pelvis wo contrast   Final Result by Erin Olivera MD (09/23 1535)      1  Cholelithiasis with mild gallbladder luminal distention  Note is made of patient's elevated alkaline phosphatase    Ultrasound was recently performed (September 21, 2022), and correlation for clinical signs of acute cholecystitis is recommended  2   Large colonic stool burden without bowel obstruction  3   Trace bilateral pleural effusions  Workstation performed: [id number]         XR chest portable   Final Result by Catracho Layton MD (09/23 1437)      Increased interstitial markings, improved from 9/6/2022, question interstitial edema or infectious/inflammatory  Workstation performed: [id number]         US right upper quadrant with liver dopplers   Final Result by Erin Olivera MD (09/22 9824)      Cholelithiasis without findings of acute cholecystitis  Workstation performed: [id number]         XR abdomen 1 view kub   Final Result by Nate Martin MD (09/15 0802)      No acute findings  Significant colon stool, correlate clinically for constipation  Workstation performed: [id number]         XR chest portable   Final Result by Flako Saxena DO (09/07 1103)      Right-sided PICC tip projects at the right subclavian vein  Mild bilateral peribronchial thickening, possibly related to chronic bronchitis  No acute infiltrates  Minimal linear atelectasis or scarring in the mid lungs bilaterally  Workstation performed: [id number]         CT abdomen pelvis with contrast   Final Result by Antonio Matamoros MD (64/46 9603)      1  Diffuse wall thickening of the colon with surrounding fat stranding suspicious for colitis i e  infectious or inflammatory  2   Mild circumferential wall thickening of the urinary bladder  Correlate with urinalysis for possible cystitis  3   Cholelithiasis  The study was marked in [facility] for immediate notification  Workstation performed: [id number]             Portions of the record may have been created with voice recognition software   Occasional wrong word or "sound a like" substitutions may have occurred due to the inherent limitations of voice recognition software  Read the chart carefully and recognize, using context, where substitutions have occurred

## 2022-10-21 NOTE — CASE MANAGEMENT
Case Management Discharge Planning Note    Patient name Tacho Cuello  Location [address] [place] [address] 323/PPHP 832-10 MRN [id number]  : 1945 Date 10/21/2022       Current Admission Date: 2022  Current Admission Diagnosis:Sepsis [facility])   Patient Active Problem List    Diagnosis Date Noted   • Hyperkalemia 10/05/2022   • Hypotension 2022   • Urinary retention 2022   • Acute encephalopathy 2022   • Moderate protein-calorie malnutrition ([address] ) 2022   • Bacteremia 2022   • Acute on chronic anemia 2022   • Goals of care, counseling/discussion 2022   • Colitis presumed to be due to infection 2022   • Acute cystitis without hematuria 2022   • Acute kidney insufficiency 2022   • Fall 2022   • Diarrhea 2022   • Sepsis ([address] ) 2022   • Sacral ulcer ([address] ) 2022   • Gram-positive bacteremia 2022   • Severe protein-calorie malnutrition ([address] ) 2022   • Bilateral leg edema 2022   • Ambulatory dysfunction 2022   • Acute blood loss anemia 2022   • NOAH (acute kidney injury) ([address] ) 2022   • Gastric ulcer 2022   • Anemia 2022   • Dyspnea on exertion 2022   • Generalized weakness 2022   • Elevated LFTs 2022   • Hyponatremia 2022   • Abnormal CT scan 2022   • H/O bariatric surgery - bypass 2022   • Nasal congestion 2022   • Bilateral hearing loss 2022   • Fibromyalgia syndrome 2021   • Osteopenia of both forearms 2021   • Shortness of breath 2021   • Acute bronchitis 2021   • Dysphasia 2020   • Epigastric pain 2020   • Hypothyroidism 2020   • Gastroesophageal reflux disease without esophagitis 2020   • Depression 2020   • Fibromyalgia, primary 2020   • Iron deficiency 2020   • Numbness of foot 2020      LOS (days): 51  Geometric Mean LOS (GMLOS) (days): 4 80  Days to GMLOS:-46 5 OBJECTIVE:  Risk of Unplanned Readmission Score: 42 02         Current admission status: Inpatient   Preferred Pharmacy:   [facility] [address] [facility] [address], [place] [address] [facility] [address]  Phone: [phone] Fax: [phone]    [facility], [address] [facility] [address]  Phone: [phone] Fax: [phone]    Primary Care Provider: Uche Fuentes MD    Primary Insurance: [facility]  Secondary Insurance:     DISCHARGE DETAILS:    Discharge planning discussed with[de-identified] Patient's brother Shawn Dumontll of Choice: Yes  Comments - Freedom of Choice: Family requesting closer STR to [facility]  Referrals placed to [facility], [facility] and [facility] home does not accept insurance  CM contacted family/caregiver?: Yes (Brother Reed Camargo)  Were Treatment Team discharge recommendations reviewed with patient/caregiver?: Yes (STR rec)  Did patient/caregiver verbalize understanding of patient care needs?: N/A- going to facility  Were patient/caregiver advised of the risks associated with not following Treatment Team discharge recommendations?: Yes    Contacts  Patient Contacts: Reed Camargo Brother  Relationship to Patient[de-identified] Family  Contact Method: Phone  Phone Number: [phone]  Reason/Outcome: Continuity of Care, Emergency [address]         Is the patient interested in [facility] at discharge?: No    DME Referral Provided  Referral made for DME?: No    Other Referral/Resources/Interventions Provided:  Interventions: Short Term Rehab  Referral Comments: Referrals made to [facility], [facility] and [facility] as pt's family has previsouly requested to stay close to [facility] does not accept pt's insurance per response on AIDIN and left M for admissions depart at [facility] to return call or place response on AIDIN  received call from [facility] admissions at [facility] [phone] and they can accept pt pending insurance auth  Called placed to [facility] pt's brother Oswald Pallas [phone] as CM /MD have been speaking with Brother Abhinav Evans regarding STR  Per Abhinav Evans " the family as a unit" makes the decisions but both he and brother in law Leeann Galloway have [address] but brother in law Guilherme Escobar lives in [address] [place] [address] and brother Abhinav Evans lives in [place] so Guilherme Escobar would be the one to sign any documents for the patient  He will call Guilherme Escobar to discuss bed offer at [facility], provided all contact inform/address of [facility], was appreciative and happy that they offered an STR bed and will call this CM back today to notify of their decision  Updated SLIM provider  Dr Jose Caldwell of the above  Abhinav Evans states they have been working with an  and are aware that pt will need to file for [place] for LTC but wanted to wait to see if pt can go to a [address] [place] [address] facility  Per liorer Abhinav Leodanjuan carlos they would prefer pt not return to [facility] as he stated  "she did not receive good care there and got two infections while being there"            Treatment Team Recommendation: SNF  Discharge Destination Plan[de-identified] SNF  Transport at Discharge : [facility] Ambulance

## 2022-10-21 NOTE — PROGRESS NOTES
[address] [facility]  Progress Note - Teja Rivera 1945, 76 y o  female MRN: [id number]  Unit/Bed#: [facility] 323-01 Encounter: [id number]  Primary Care Provider: Issac Lyn MD   Date and time admitted to hospital: 8/30/2022 11:49 PM    Acute encephalopathy  Assessment & Plan  · Patient has waxing waning mental status   · Cognition and mental status issues multifactorial in nature stemming from prolonged illness, numerous hospitalizations, chronic malnutrition, and chronic/recurrent infections  · Monitor clinically  · Delirium precautions  · Neuropsych consulted for capacity eval-patient does not have capacity per neuropsych  · Given history of bipolar disorder, psychiatric consult requested to ensure this has been optimized - his saw patient on 10/19, no changes necessary at this time      Goals of care, counseling/discussion  Assessment & Plan  · Evaluated by palliative care  Wants to continue medical directed treatments with limits of DNR/DNI  · Spoke with patient's brother Anita Thomas on 10/11/22 who is power of  ( but financial power of ), We discussed goals of care  Brother, Anita Thomas agrees that we need to address palliative/comfort option again given her failure to thrive  He will discuss with rest of the siblings and they all will discussed with patient  In next few days he will update us wether they  would like to have a meeting with palliative/hospice again  10/13/22 Called [facility]  for f/u- no response  Called LUCIA- reji Locke- wife responded- explained to her that I had spoken with Anita Thomas and to see if they had a family meeting  Also  patient did express that she feels comfort care is the route to go- reached out to Palliative team for follow up discussion on [address] and family meeting  10/14/22  Palliative team spoke with patient who today stated otherwise that she wants to do everything   Also Neuropsychiatry had evaluated patient and deemed no capacity, hence Palliative team has arranged a family meeting for Monday      10/17/22:  Per report, goals of care discussion was done and consensus is that patient will continue therapeutic treatment, will not transition to hospice at this point    Acute on chronic anemia  Assessment & Plan  · 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  · S/p recent EGD in 7/22 revealing: Residual marginal ulcer of the gastrojejunostomy anastomosis with improved size  · S/p EGD 9/2 revealing: Large, cratered ulcer in the gastrojejunal anastomosis   · Status post 1 unit PRBCs this admission  · Per d/w nephrology, requsted to switch from PPI to pepcid given c/f eosinophilia, will need to hold off on this given EGD findings with ulcer for now  ·     Moderate protein-calorie malnutrition ([address] )  Assessment & Plan  Malnutrition Findings:   Adult Malnutrition type: Chronic illness  Adult Degree of Malnutrition: Other severe protein calorie malnutrition  Malnutrition Characteristics: Weight loss, Muscle loss       360 Statement: Severe/Chronic malnutrition r/t condition, as evidenced by 4 8% wt loss x < 1 week (9/2/22: 125#, 9/5/22: 119#), and severe muscle mass depletion (temples/clavicle)  Treated with liberalized diet and nutrition supplements, possibly nutrition support pending goals of care    BMI Findings: Body mass index is 18 71 kg/m²  · Continue tube feeds    Diarrhea  Assessment & Plan  · Recent history of C diff colitis  · Treated with prophylactic dose of p o  Vancomycin while on antibiotics     Ambulatory dysfunction  Assessment & Plan  · Will need rehab on discharge      Abnormal CT scan  Assessment & Plan  · Noted on CT C/A/P 9/26  - Focal thickening of the lower portion of the endometrium  While this is not expected in a patient of this age, this appears stable from 2016 suggesting a benign etiology  If there is clinical concern, follow-up ultrasound is recommended    - Small left upper lobe nodules, one of which is new from 2022  Given the history of smoking, a follow-up chest CT is recommended in 12 months  · This was d/w sister and brother POA over phone by prior provider     * Sepsis [facility])  Assessment & Plan  · Initially met criteria with fever, tachypnea, tachycardia, and leukocytosis on admission  Sepsis secondary to recurrent bacteremia and PICC line infections  · PICC line has been removed and patient had PEG tube placed for nutritional support  · Finished full course of ertapenem for ESBL UTI  · Patient with recurrent fever 10/7/22- Started empiric vancomycin whic was dced  · Discussed with infectious disease  observe off of antibiotic , cultures- sent on 10/9/22- NGTD          VTE Pharmacologic Prophylaxis: VTE Score: 3 Moderate Risk (Score 3-4) - Pharmacological DVT Prophylaxis Ordered: heparin  Patient Centered Rounds: Discussed with RN  Discussions with Specialists or Other Care Team Provider:  None today    Education and Discussions with Family / Patient: No clinical update  Time Spent for Care: 20 minutes  More than 50% of total time spent on counseling and coordination of care as described above  Current Length of Stay: 51 day(s)  Current Patient Status: Inpatient   Certification Statement: The patient will continue to require additional inpatient hospital stay due to Awaiting rehab  Discharge Plan: Anticipate discharge tomorrow to rehab facility  Code Status: Level 3 - DNAR and DNI    Subjective:   Says “are you out of your mind” when I come in the room to wake her up, tells me to go away and she does not need anything  Objective:     Vitals:   Temp (24hrs), Av 2 °F (36 8 °C), Min:97 9 °F (36 6 °C), Max:98 3 °F (36 8 °C)    Temp:  [97 9 °F (36 6 °C)-98 3 °F (36 8 °C)] 98 3 °F (36 8 °C)  HR:  [60-77] 77  Resp:  [16-18] 16  BP: (115-144)/(58-61) 115/58  SpO2:  [93 %-96 %] 93 %  Body mass index is 18 71 kg/m²       Input and Output Summary (last 24 hours): Intake/Output Summary (Last 24 hours) at 10/21/2022 1543  Last data filed at 10/20/2022 2300  Gross per 24 hour   Intake 240 ml   Output 150 ml   Net 90 ml       Physical Exam:   Physical Exam  Vitals reviewed  Constitutional:       General: She is not in acute distress  Appearance: She is not toxic-appearing  HENT:      Mouth/Throat:      Mouth: Mucous membranes are moist    Eyes:      General: No scleral icterus  Pulmonary:      Effort: Pulmonary effort is normal  No respiratory distress  Skin:     General: Skin is warm and dry  Neurological:      Mental Status: She is alert              Additional Data:     Labs:  Results from last 7 days   Lab Units 10/19/22  0656 10/17/22  1629   WBC Thousand/uL 9 84 12 74*   HEMOGLOBIN g/dL 7 3* 8 0*   HEMATOCRIT % 23 3* 25 4*   PLATELETS Thousands/uL 445* 441*   NEUTROS PCT %  --  55   LYMPHS PCT %  --  21   MONOS PCT %  --  7   EOS PCT %  --  16*     Results from last 7 days   Lab Units 10/19/22  0656 10/17/22  1629   SODIUM mmol/L 135 132*   POTASSIUM mmol/L 4 2 4 2   CHLORIDE mmol/L 106 100   CO2 mmol/L 23 28   BUN mg/dL 36* 34*   CREATININE mg/dL 1 84* 1 77*   ANION GAP mmol/L 6 4   CALCIUM mg/dL 10 0 10 2*   ALBUMIN g/dL  --  2 1*   TOTAL BILIRUBIN mg/dL  --  0 41   ALK PHOS U/L  --  101   ALT U/L  --  19   AST U/L  --  28   GLUCOSE RANDOM mg/dL 114 91         Results from last 7 days   Lab Units 10/21/22  1201 10/21/22  0554 10/20/22  2348 10/20/22  1745 10/20/22  1109 10/20/22  0630 10/19/22  1148 10/19/22  0732 10/19/22  0101 10/18/22  2151 10/18/22  1748 10/18/22  1323   POC GLUCOSE mg/dl 109 87 109 103 128 130 116 115 97 100 91 110               Lines/Drains:  Invasive Devices  Report    Peripheral Intravenous Line  Duration           Peripheral IV 10/19/22 Right;Ventral (anterior) Forearm 2 days          Drain  Duration           Gastrostomy/Enterostomy Percutaneous Endoscopic Gastrostomy (PEG) 20 Fr  LUQ 42 days    External Urinary Catheter <1 day                      Imaging: No pertinent imaging reviewed  Recent Cultures (last 7 days):         Last 24 Hours Medication List:   Current Facility-Administered Medications   Medication Dose Route Frequency Provider Last Rate   • acetaminophen  975 mg Oral Q6H PRN Karolyn Sánchez PA-C     • ALPRAZolam  0 25 mg Oral TID PRN Karol Lara DO     • bisacodyl  10 mg Rectal Daily PRN Zeinab Radford MD     • clonazePAM  3 mg Oral HS Jovita Mcdermott, DO     • folic acid  1 mg Oral Daily Jovita Mcdermott, DO     • guaiFENesin  600 mg Oral Q12H [address] Karolyn Sánchez PA-C     • heparin (porcine)  5,000 Units Subcutaneous [facility] Karol Lara, DO     • levothyroxine  125 mcg Oral Q24H [place] Kvng Santacruz MD     • metoclopramide  10 mg Intravenous Q6H PRN Zeinab Radford MD     • midodrine  10 mg Oral TID AC TOLU Orellana     • ondansetron  4 mg Intravenous Q6H PRN Danni Jimenez, DO     • oxyCODONE  5 mg Oral Q6H Zeinab Radford MD     • pantoprazole  40 mg Oral Early Morning Stoney Marroquin MD     • polyethylene glycol  17 g Per PEG Tube BID Danni Jimenez, DO     • senna  2 tablet Oral HS Loida Tenorio MD     • sodium chloride  2 g Oral TID Ekta Crooked, DO     • thiamine  100 mg Oral Daily Jovita Mcdermott, DO     • traZODone  150 mg Oral HS Danni Jimenez DO          Today, Patient Was Seen By: Stoney Marroquin    **Please Note: This note may have been constructed using a voice recognition system  **

## 2022-10-21 NOTE — CASE MANAGEMENT
Case Management Discharge Planning Note    Patient name Arcadio Williamson  Location [address] [place] [address] [address] 625[address]79 MRN [id number]  : 1945 Date 10/21/2022       Current Admission Date: 2022  Current Admission Diagnosis:Sepsis [facility])   Patient Active Problem List    Diagnosis Date Noted   • Hyperkalemia 10/05/2022   • Hypotension 2022   • Urinary retention 2022   • Acute encephalopathy 2022   • Moderate protein-calorie malnutrition ([address] ) 2022   • Bacteremia 2022   • Acute on chronic anemia 2022   • Goals of care, counseling/discussion 2022   • Colitis presumed to be due to infection 2022   • Acute cystitis without hematuria 2022   • Acute kidney insufficiency 2022   • Fall 2022   • Diarrhea 2022   • Sepsis ([address] ) 2022   • Sacral ulcer ([address] ) 2022   • Gram-positive bacteremia 2022   • Severe protein-calorie malnutrition ([address] ) 2022   • Bilateral leg edema 2022   • Ambulatory dysfunction 2022   • Acute blood loss anemia 2022   • NOAH (acute kidney injury) ([address] ) 2022   • Gastric ulcer 2022   • Anemia 2022   • Dyspnea on exertion 2022   • Generalized weakness 2022   • Elevated LFTs 2022   • Hyponatremia 2022   • Abnormal CT scan 2022   • H/O bariatric surgery - bypass 2022   • Nasal congestion 2022   • Bilateral hearing loss 2022   • Fibromyalgia syndrome 2021   • Osteopenia of both forearms 2021   • Shortness of breath 2021   • Acute bronchitis 2021   • Dysphasia 2020   • Epigastric pain 2020   • Hypothyroidism 2020   • Gastroesophageal reflux disease without esophagitis 2020   • Depression 2020   • Fibromyalgia, primary 2020   • Iron deficiency 2020   • Numbness of foot 2020      LOS (days): 51  Geometric Mean LOS (GMLOS) (days): 4 80  Days to GMLOS:-46 7 OBJECTIVE:  Risk of Unplanned Readmission Score: 42 02         Current admission status: Inpatient   Preferred Pharmacy:   [facility] [address] [facility], [address] [place] [address] [address] [place] [address]  Phone: [phone] Fax: [phone] [facility], [address] [address]  Phone: [phone] Fax: [phone]    Primary Care Provider: Lise Ray MD    Primary Insurance: [facility]  Secondary Insurance:     DISCHARGE DETAILS:                Other Referral/Resources/Interventions Provided:  Interventions: Short Term Rehab  Referral Comments: Received email from pt's brother Sobia Navarrete  After speaking with family they do not want to  accept [facility] bed offer as pt would have difficulty obtaining MA in [place] as she is a [place] resident  Per Ryan Rosa in  [facility] admissions she would also concur the pt should look for a facility in [place]  for this reason if pursuing LTC and perhaps looking into the  [facility] area as they are closer than [place]  Brother Sobia Navarrete agreeable to referrals to [facility], [facility] [place] and [email or web address] and rehab  Referrals made on AIDIN  Updated SLIM provider Dr Jaye Infante of the above

## 2022-10-21 NOTE — PLAN OF CARE
Problem: INFECTION - ADULT  Goal: Absence or prevention of progression during hospitalization  Description: INTERVENTIONS:  - Assess and monitor for signs and symptoms of infection  - Monitor lab/diagnostic results  - Monitor all insertion sites, i e  indwelling lines, tubes, and drains  - Monitor endotracheal if appropriate and nasal secretions for changes in amount and color  - [place] appropriate cooling/warming therapies per order  - Administer medications as ordered  - Instruct and encourage patient and family to use good hand hygiene technique  - Identify and instruct in appropriate isolation precautions for identified infection/condition  10/21/2022 0511 by Jolene Fernandez LPN  Outcome: Progressing  10/21/2022 0511 by Jolene Fernandez LPN  Outcome: Progressing  10/21/2022 0511 by Jolene Fernandez LPN  Outcome: Not Progressing

## 2022-10-21 NOTE — QUICK NOTE
Repeat TSH and free T4 ordered to re-evaluate patient's thyroid function on current dose of levothyroxine was not done as patient refused lab draw (per RN)  Recommend to continue on current dose of 125 mcg q2PM of levothyroxine

## 2022-10-21 NOTE — CASE MANAGEMENT
Case Management Discharge Planning Note    Patient name Tacho Cuello  Location [address] [place] [address] [facility] [id number]  : 1945 Date 10/21/2022       Current Admission Date: 2022  Current Admission Diagnosis:Sepsis [facility])   Patient Active Problem List    Diagnosis Date Noted   • Hyperkalemia 10/05/2022   • Hypotension 2022   • Urinary retention 2022   • Acute encephalopathy 2022   • Moderate protein-calorie malnutrition ([address] ) 2022   • Bacteremia 2022   • Acute on chronic anemia 2022   • Goals of care, counseling/discussion 2022   • Colitis presumed to be due to infection 2022   • Acute cystitis without hematuria 2022   • Acute kidney insufficiency 2022   • Fall 2022   • Diarrhea 2022   • Sepsis ([address] ) 2022   • Sacral ulcer ([address] ) 2022   • Gram-positive bacteremia 2022   • Severe protein-calorie malnutrition ([address] ) 2022   • Bilateral leg edema 2022   • Ambulatory dysfunction 2022   • Acute blood loss anemia 2022   • NOAH (acute kidney injury) ([address] ) 2022   • Gastric ulcer 2022   • Anemia 2022   • Dyspnea on exertion 2022   • Generalized weakness 2022   • Elevated LFTs 2022   • Hyponatremia 2022   • Abnormal CT scan 2022   • H/O bariatric surgery - bypass 2022   • Nasal congestion 2022   • Bilateral hearing loss 2022   • Fibromyalgia syndrome 2021   • Osteopenia of both forearms 2021   • Shortness of breath 2021   • Acute bronchitis 2021   • Dysphasia 2020   • Epigastric pain 2020   • Hypothyroidism 2020   • Gastroesophageal reflux disease without esophagitis 2020   • Depression 2020   • Fibromyalgia, primary 2020   • Iron deficiency 2020   • Numbness of foot 2020      LOS (days): 51  Geometric Mean LOS (GMLOS) (days): 4 80  Days to GMLOS:-46 4 OBJECTIVE:  Risk of Unplanned Readmission Score: 42 02         Current admission status: Inpatient   Preferred Pharmacy:   [facility] [address] [facility], [address], [place] [address] [facility] [address]  Phone: [phone] Fax: [phone]    [facility], [address] [address]  Phone: [phone] Fax: [phone]    Primary Care Provider: Annetta Mejia MD    Primary Insurance: [facility]  Secondary Insurance:     DISCHARGE DETAILS:            Other Referral/Resources/Interventions Provided:  Interventions: Short Term Rehab  Referral Comments: Called and spoke with [facility] [phone] and spoke with Anu Huffman Director of Nursing they do not accept pt's insurance  Noted in previous CM notes that pt's family would like to have pt closer to the [facility]  Referrals placed to [facility], [facility] and [facility]  Reached out via TT to [facility] and they can accept pt back, she does not requires Optioning, but will need insurance auth and the earliest they have a bed available is Monday 10/24  Will discuss with patient and the pt's brother in law Jeff Mc BRANDON  and if accepting [facility] Bed, Niecy will submit for insurance auth  Per SLIM Provider , Dr Micah Ramirez pt is medically cleared for DC

## 2022-10-21 NOTE — ASSESSMENT & PLAN NOTE
· Evaluated by palliative care  Wants to continue medical directed treatments with limits of DNR/DNI  · Spoke with patient's brother Mary Lou Crespo on 10/11/22 who is power of  ( but financial power of ), We discussed goals of care  Brother, Mary Lou Crespo agrees that we need to address palliative/comfort option again given her failure to thrive  He will discuss with rest of the siblings and they all will discussed with patient  In next few days he will update us wether they  would like to have a meeting with palliative/hospice again  10/13/22 Called [facility]  for f/u- no response  Called LUCIA- reji Locke- wife responded- explained to her that I had spoken with Mary Lou Crespo and to see if they had a family meeting  Also  patient did express that she feels comfort care is the route to go- reached out to Palliative team for follow up discussion on [address] and family meeting  10/14/22  Palliative team spoke with patient who today stated otherwise that she wants to do everything   Also Neuropsychiatry had evaluated patient and deemed no capacity, hence Palliative team has arranged a family meeting for Monday      10/17/22:  Per report, goals of care discussion was done and consensus is that patient will continue therapeutic treatment, will not transition to hospice at this point

## 2022-10-21 NOTE — PROGRESS NOTES
NEPHROLOGY PROGRESS NOTE   Arcadio Williamson 76 y o  female MRN: [id number]  Unit/Bed#: [facility] 323-01 Encounter: [id number]  Reason for Consult: NOAH    ASSESSMENT AND PLAN:  NOAH on CKD stage 3, baseline creatinine 0 8 to 1 2 since early 2022, more recently creatinine seems to have plateaued around 1 4 to 1 6 since early September 2022  -creatinine 1 8 on 10/19/22  No labs available since then  Patient has been refusing labs  Creatinine seems to have plateaued 1 7 to 1 8 over last one week  -BMP results to follow today  -status post IV albumin  -GFR likely overestimated given poor muscle mass  -UA earlier this month showed 1+ proteinuria, 4 to 10 RBCs/WBCs   -elevated creatinine suspected to be secondary to UTI, infection issues  Another differential remains AIN  -noted peripheral eosinophilia, urine eosinophil 5%, no new rash  -FeNa 2 4%  -ideally would like to change Protonix to Pepcid although remains on Protonix due to recent EGD showing GJ anastomosis ulcer      Hyponatremia  -sodium level overall improving 135 on 10/19/22  BMP results to follow today   Patient has been refusing labs  -suspect in the setting of poor solute intake  -workup includes urine sodium 99, urine osmolality 415, serum osmolality 283  -currently on salt tablet 2 g p o  T i d , she has been refusing salt tablets yesterday and today  -BMP in [facility] -she gets free water flushes 125 mL Q four hourly with tube feeds      Chronic hypotension, currently on midodrine 10 mg t i d , continue same      Metabolic acidosis, now remains off bicarb supplements, bicarb level 23, no BMP available today      Initial ESBL Klebsiella, Proteus UTI, status post antibiotic  Recent C diff infection, completed C diff prophylaxis  Malnutrition, on tube feeds     Anemia with recent GI bleed, transfuse p r n   For hemoglobin less than seven   Continue to monitor  -recent EGD in September 2022 showed large cratered ulcer in [address] anastomosis, GI follow-up  -iron saturation 17%     Patient was found not to have capacity to make informed medical decisions  Status post gastric bypass eventual anastomotic ulcer, severe malnutrition, status post PEG tube placed, GI follow-up      Discussed above plan in detail with primary team     SUBJECTIVE:  Patient seen and examined at bedside    Denies chest pain, shortness of breath, nausea or vomiting    OBJECTIVE:  Current Weight: Weight - Scale: 49 4 kg (109 lb)  Vitals:    10/21/22 0745   BP: 115/58   Pulse: 77   Resp: 16   Temp: 98 3 °F (36 8 °C)   SpO2: 93%       Intake/Output Summary (Last 24 hours) at 10/21/2022 1207  Last data filed at 10/20/2022 2300  Gross per 24 hour   Intake 240 ml   Output 150 ml   Net 90 ml     Wt Readings from Last 3 Encounters:   10/18/22 49 4 kg (109 lb)   08/09/22 59 6 kg (131 lb 6 4 oz)   08/02/22 52 2 kg (115 lb)     Temp Readings from Last 3 Encounters:   10/21/22 98 3 °F (36 8 °C) (Oral)   08/09/22 99 3 °F (37 4 °C) (Oral)   07/20/22 98 4 °F (36 9 °C) (Temporal)     BP Readings from Last 3 Encounters:   10/21/22 115/58   08/09/22 130/61   07/20/22 103/57     Pulse Readings from Last 3 Encounters:   10/21/22 77   08/09/22 91   07/20/22 76        Physical Examination:  General:  Lying in bed, no acute distress   Eyes:  Mild conjunctival pallor present  ENT:  External examination of ears and nose unremarkable  Neck:  No Obvious lymphadenopathy appreciated  Respiratory:  Bilateral air entry present  CVS:  S1, S2 present  GI:  Soft, nondistended  CNS:  Active, alert, follows commands  Skin:  No new rash  Musculoskeletal:  No obvious new gross deformity noted    Medications:    Current Facility-Administered Medications:   •  acetaminophen (TYLENOL) tablet 975 mg, 975 mg, Oral, Q6H PRN, Larry Metzger PA-C, 975 mg at 09/26/22 2315  •  ALPRAZolam Jack Maira) tablet 0 25 mg, 0 25 mg, Oral, TID PRN, Ramy Dickerson DO, 0 25 mg at 09/24/22 1616  •  bisacodyl (DULCOLAX) rectal suppository 10 mg, 10 mg, Rectal, Daily PRN, Dana Dempsey MD  •  clonazePAM (KlonoPIN) tablet 3 mg, 3 mg, Oral, HS, Jovitakash Mcdermott DO, 3 mg at 17/77/00 6110  •  folic acid (FOLVITE) tablet 1 mg, 1 mg, Oral, Daily, Jovitaaixa Mcdermott, DO, 1 mg at 10/20/22 1036  •  guaiFENesin (MUCINEX) 12 hr tablet 600 mg, 600 mg, Oral, Q12H [address], Mele Duran PA-C, 600 mg at 10/20/22 2229  •  heparin (porcine) subcutaneous injection 5,000 Units, 5,000 Units, Subcutaneous, Q8H [address], Nakita Sifuentes DO, 5,000 Units at 10/21/22 7713  •  levothyroxine tablet 125 mcg, 125 mcg, Oral, Q24H, Oluwatomisin Shazia Fernandez MD, 125 mcg at 10/20/22 1325  •  metoclopramide (REGLAN) injection 10 mg, 10 mg, Intravenous, Q6H PRN, Dana Dempsey MD  •  midodrine (PROAMATINE) tablet 10 mg, 10 mg, Oral, TID AC, TOLU Orellana, 10 mg at 10/21/22 1201  •  ondansetron (ZOFRAN) injection 4 mg, 4 mg, Intravenous, Q6H PRN, Jovita Mcdermott DO, 4 mg at 09/18/22 1339  •  oxyCODONE (ROXICODONE) IR tablet 5 mg, 5 mg, Oral, Q6H, Dana Dempsey MD, 5 mg at 10/21/22 1201  •  pantoprazole (PROTONIX) EC tablet 40 mg, 40 mg, Oral, Early Morning, Jackelin Rosario MD, 40 mg at 10/21/22 3511  •  polyethylene glycol (MIRALAX) packet 17 g, 17 g, Per PEG Tube, BID, Jovita Mcdermott DO, 17 g at 10/19/22 2002  •  senna (SENOKOT) tablet 17 2 mg, 2 tablet, Oral, HS, Yohannes Ramsay MD, 17 2 mg at 10/20/22 2229  •  sodium chloride tablet 2 g, 2 g, Oral, TID, Aislinn Hall, DO, 2 g at 10/20/22 1042  •  thiamine tablet 100 mg, 100 mg, Oral, Daily, Jovita Mcdermott, DO, 100 mg at 10/20/22 1036  •  traZODone (DESYREL) tablet 150 mg, 150 mg, Oral, HS, Jovita Mcdermott, DO, 150 mg at 10/20/22 2229    Laboratory Results:  Results from last 7 days   Lab Units 10/19/22  0656 10/17/22  1629 10/15/22  1049   WBC Thousand/uL 9 84 12 74* 12 14*   HEMOGLOBIN g/dL 7 3* 8 0* 7 9*   HEMATOCRIT % 23 3* 25 4* 26 4*   PLATELETS Thousands/uL 445* 441* 488*   SODIUM mmol/L 135 132* 133*   POTASSIUM mmol/L 4 2 4 2 4 3   CHLORIDE mmol/L 106 100 102   CO2 mmol/L 23 28 26   BUN mg/dL 36* 34* 35*   CREATININE mg/dL 1 84* 1 77* 1 80*   CALCIUM mg/dL 10 0 10 2* 9 3       CT head wo contrast   Final Result by Inge Black MD (10/11 1651)      No acute intracranial abnormality  Workstation performed: [id number]         CT spine cervical wo contrast   Final Result by Inge Black MD (10/11 1654)      No acute cervical spine fracture or traumatic malalignment  Workstation performed: [id number]         CT chest abdomen pelvis w contrast   Final Result by Hermelindo Mcclure MD (09/27 1215)      1  Patchy and groundglass opacities in the upper lobes  The appearance is most typical for pulmonary edema though in the setting of systemic inflammatory response syndrome, atypical infection is not excluded  2   Small bilateral pleural effusions and trace ascites  3   Focal thickening of the lower portion of the endometrium  While this is not expected in a patient of this age, this appears stable from 2016 suggesting a benign etiology  If there is clinical concern, follow-up ultrasound is recommended  4   Cholelithiasis  5   Small left upper lobe nodules, one of which is new from March 2022  Given the history of smoking, a follow-up chest CT is recommended in 12 months  The study was marked in EPIC for significant notification  Workstation performed: [id number]         VAS upper limb venous duplex scan, complete, bilateral   Final Result by Vidya Sotomayor MD (09/25 2039)      VAS lower limb venous duplex study, complete bilateral   Final Result by Vidya Sotomayor MD (09/25 2039)      CT abdomen pelvis wo contrast   Final Result by Thi Campbell MD (09/23 1535)      1  Cholelithiasis with mild gallbladder luminal distention  Note is made of patient's elevated alkaline phosphatase    Ultrasound was recently performed (September 21, 2022), and correlation for clinical signs of acute cholecystitis is recommended  2   Large colonic stool burden without bowel obstruction  3   Trace bilateral pleural effusions  Workstation performed: [id number]         XR chest portable   Final Result by Julio Bronson MD (09/23 1437)      Increased interstitial markings, improved from 9/6/2022, question interstitial edema or infectious/inflammatory  Workstation performed: [id number]         US right upper quadrant with liver dopplers   Final Result by Elvin Vo MD (09/22 0779)      Cholelithiasis without findings of acute cholecystitis  Workstation performed: [id number]         XR abdomen 1 view kub   Final Result by Piero Alba MD (09/15 0802)      No acute findings  Significant colon stool, correlate clinically for constipation  Workstation performed: [id number]         XR chest portable   Final Result by Sharon Emanuel DO (09/07 1103)      Right-sided PICC tip projects at the right subclavian vein  Mild bilateral peribronchial thickening, possibly related to chronic bronchitis  No acute infiltrates  Minimal linear atelectasis or scarring in the mid lungs bilaterally  Workstation performed: [id number]         CT abdomen pelvis with contrast   Final Result by Faisal Steiner MD (97/54 8084)      1  Diffuse wall thickening of the colon with surrounding fat stranding suspicious for colitis i e  infectious or inflammatory  2   Mild circumferential wall thickening of the urinary bladder  Correlate with urinalysis for possible cystitis  3   Cholelithiasis  The study was marked in [facility] for immediate notification  Workstation performed: [id number]             Portions of the record may have been created with voice recognition software  Occasional wrong word or "sound a like" substitutions may have occurred due to the inherent limitations of voice recognition software  Read the chart carefully and recognize, using context, where substitutions have occurred

## 2022-10-22 NOTE — ASSESSMENT & PLAN NOTE
· Evaluated by palliative care  Wants to continue medical directed treatments with limits of DNR/DNI  · Spoke with patient's brother Ihsan Nielsen on 10/11/22 who is power of  ( but financial power of ), We discussed goals of care  Brother, Ihsan Nielsen agrees that we need to address palliative/comfort option again given her failure to thrive  He will discuss with rest of the siblings and they all will discussed with patient  In next few days he will update us wether they  would like to have a meeting with palliative/hospice again  10/13/22 Called [facility]  for f/u- no response  Called LUCIA- reji Locke- wife responded- explained to her that I had spoken with Ihsan Nielsen and to see if they had a family meeting  Also  patient did express that she feels comfort care is the route to go- reached out to Palliative team for follow up discussion on [address] and family meeting  10/14/22  Palliative team spoke with patient who today stated otherwise that she wants to do everything   Also Neuropsychiatry had evaluated patient and deemed no capacity, hence Palliative team has arranged a family meeting for Monday      10/17/22:  Per report, goals of care discussion was done and consensus is that patient will continue therapeutic treatment, will not transition to hospice at this point

## 2022-10-22 NOTE — PROGRESS NOTES
[address] [facility]  Progress Note - Arcadio Williamson 1945, 76 y o  female MRN: [id number]  Unit/Bed#: [facility] 323-01 Encounter: [id number]  Primary Care Provider: Franco Dowling MD   Date and time admitted to hospital: 8/30/2022 11:49 PM    Acute encephalopathy  Assessment & Plan  · Patient has waxing waning mental status   · Cognition and mental status issues multifactorial in nature stemming from prolonged illness, numerous hospitalizations, chronic malnutrition, and chronic/recurrent infections  · Monitor clinically  · Delirium precautions  · Neuropsych consulted for capacity eval-patient does not have capacity per neuropsych  · Given history of bipolar disorder, psychiatric consult requested to ensure this has been optimized - his saw patient on 10/19, no changes necessary at this time      Goals of care, counseling/discussion  Assessment & Plan  · Evaluated by palliative care  Wants to continue medical directed treatments with limits of DNR/DNI  · Spoke with patient's brother Liberty Carrington on 10/11/22 who is power of  ( but financial power of ), We discussed goals of care  Brother, Liberty Carrington agrees that we need to address palliative/comfort option again given her failure to thrive  He will discuss with rest of the siblings and they all will discussed with patient  In next few days he will update us wether they  would like to have a meeting with palliative/hospice again  10/13/22 Called [facility]  for f/u- no response  Called LUCIA- reji Locke- wife responded- explained to her that I had spoken with Liberty Carrington and to see if they had a family meeting  Also  patient did express that she feels comfort care is the route to go- reached out to Palliative team for follow up discussion on [address] and family meeting  10/14/22  Palliative team spoke with patient who today stated otherwise that she wants to do everything   Also Neuropsychiatry had evaluated patient and deemed no capacity, hence Palliative team has arranged a family meeting for Monday      10/17/22:  Per report, goals of care discussion was done and consensus is that patient will continue therapeutic treatment, will not transition to hospice at this point    Acute on chronic anemia  Assessment & Plan  · 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  · S/p recent EGD in 7/22 revealing: Residual marginal ulcer of the gastrojejunostomy anastomosis with improved size  · S/p EGD 9/2 revealing: Large, cratered ulcer in the gastrojejunal anastomosis   · Status post 1 unit PRBCs this admission  · Per d/w nephrology, requsted to switch from PPI to pepcid given c/f eosinophilia, will need to hold off on this given EGD findings with ulcer for now  ·     Moderate protein-calorie malnutrition ([facility] [address] )  Assessment & Plan  Malnutrition Findings:   Adult Malnutrition type: Chronic illness  Adult Degree of Malnutrition: Other severe protein calorie malnutrition  Malnutrition Characteristics: Weight loss, Muscle loss       360 Statement: Severe/Chronic malnutrition r/t condition, as evidenced by 4 8% wt loss x < 1 week (9/2/22: 125#, 9/5/22: 119#), and severe muscle mass depletion (temples/clavicle)  Treated with liberalized diet and nutrition supplements, possibly nutrition support pending goals of care    BMI Findings: Body mass index is 18 71 kg/m²  · Continue tube feeds    Diarrhea  Assessment & Plan  · Recent history of C diff colitis  · Treated with prophylactic dose of p o   Vancomycin while on antibiotics     Ambulatory dysfunction  Assessment & Plan  · Will need rehab on discharge      NOAH (acute kidney injury) ([facility] [address] )  Assessment & Plan  · Renal function appears to have stabilized around creatinine of 1 5-1 7  · Patient intermittently refusing labs, trend as able  · Possibly new baseline per Nephrology      Abnormal CT scan  Assessment & Plan  · Noted on CT C/A/P 9/26  - Focal thickening of the lower portion of the endometrium  While this is not expected in a patient of this age, this appears stable from 2016 suggesting a benign etiology  If there is clinical concern, follow-up ultrasound is recommended  - Small left upper lobe nodules, one of which is new from 2022  Given the history of smoking, a follow-up chest CT is recommended in 12 months  · This was d/w sister and brother POA over phone by prior provider     * Sepsis [facility])  Assessment & Plan  · Initially met criteria with fever, tachypnea, tachycardia, and leukocytosis on admission  Sepsis secondary to recurrent bacteremia and PICC line infections  · PICC line has been removed and patient had PEG tube placed for nutritional support  · Finished full course of ertapenem for ESBL UTI  · Patient with recurrent fever 10/7/22- Started empiric vancomycin whic was dced  · Discussed with infectious disease  observe off of antibiotic , cultures- sent on 10/9/22- negative            VTE Pharmacologic Prophylaxis: VTE Score: 3 High Risk (Score >/= 5) - Pharmacological DVT Prophylaxis Ordered: heparin  Sequential Compression Devices Ordered  Patient Centered Rounds: d/wRN  Discussions with Specialists or Other Care Team Provider: none    Education and Discussions with Family / Patient: no clinical update  Time Spent for Care: 20 minutes  More than 50% of total time spent on counseling and coordination of care as described above  Current Length of Stay: 52 day(s)  Current Patient Status: Inpatient   Certification Statement: The patient will continue to require additional inpatient hospital stay due to awaiting rehab placement  Discharge Plan: Anticipate discharge in 48-72 hrs to rehab facility      Code Status: Level 3 - DNAR and DNI    Subjective:   nad    Objective:     Vitals:   Temp (24hrs), Av 5 °F (36 9 °C), Min:98 3 °F (36 8 °C), Max:98 8 °F (37 1 °C)    Temp:  [98 3 °F (36 8 °C)-98 8 °F (37 1 °C)] 98 3 °F (36 8 °C)  HR:  [86-90] 86  Resp: [16-17] 17  BP: (119-122)/(56-60) 120/56  SpO2:  [92 %-93 %] 93 %  Body mass index is 18 71 kg/m²  Input and Output Summary (last 24 hours): Intake/Output Summary (Last 24 hours) at 10/22/2022 1840  Last data filed at 10/22/2022 0615  Gross per 24 hour   Intake --   Output 1050 ml   Net -1050 ml       Physical Exam:   Physical Exam  Vitals reviewed  Constitutional:       General: She is not in acute distress  Appearance: She is not toxic-appearing  HENT:      Mouth/Throat:      Mouth: Mucous membranes are moist    Eyes:      General: No scleral icterus  Pulmonary:      Effort: Pulmonary effort is normal  No respiratory distress  Abdominal:      Comments: Doesn't allow me to take off blankets to look at abd bc doesn't want to fel cold   Skin:     General: Skin is warm and dry  Neurological:      Mental Status: She is alert              Additional Data:     Labs:  Results from last 7 days   Lab Units 10/19/22  0656 10/17/22  1629   WBC Thousand/uL 9 84 12 74*   HEMOGLOBIN g/dL 7 3* 8 0*   HEMATOCRIT % 23 3* 25 4*   PLATELETS Thousands/uL 445* 441*   NEUTROS PCT %  --  55   LYMPHS PCT %  --  21   MONOS PCT %  --  7   EOS PCT %  --  16*     Results from last 7 days   Lab Units 10/19/22  0656 10/17/22  1629   SODIUM mmol/L 135 132*   POTASSIUM mmol/L 4 2 4 2   CHLORIDE mmol/L 106 100   CO2 mmol/L 23 28   BUN mg/dL 36* 34*   CREATININE mg/dL 1 84* 1 77*   ANION GAP mmol/L 6 4   CALCIUM mg/dL 10 0 10 2*   ALBUMIN g/dL  --  2 1*   TOTAL BILIRUBIN mg/dL  --  0 41   ALK PHOS U/L  --  101   ALT U/L  --  19   AST U/L  --  28   GLUCOSE RANDOM mg/dL 114 91         Results from last 7 days   Lab Units 10/22/22  1749 10/22/22  1220 10/22/22  0614 10/22/22  0052 10/21/22  2109 10/21/22  1712 10/21/22  1201 10/21/22  0554 10/20/22  2348 10/20/22  1745 10/20/22  1109 10/20/22  0630   POC GLUCOSE mg/dl 103 85 117 121 109 90 109 87 109 103 128 130               Lines/Drains:  Invasive Devices  Report    Peripheral Intravenous Line  Duration           Peripheral IV 10/19/22 Right;Ventral (anterior) Forearm 3 days          Drain  Duration           Gastrostomy/Enterostomy Percutaneous Endoscopic Gastrostomy (PEG) 20 Fr  LUQ 44 days    External Urinary Catheter 2 days                      Imaging: No pertinent imaging reviewed  Recent Cultures (last 7 days):         Last 24 Hours Medication List:   Current Facility-Administered Medications   Medication Dose Route Frequency Provider Last Rate   • acetaminophen  975 mg Oral Q6H PRN Karolyn Sánchez PA-C     • ALPRAZolam  0 25 mg Oral TID PRN Karol Lara DO     • bisacodyl  10 mg Rectal Daily PRN Zeinab Radford MD     • clonazePAM  3 mg Oral HS Jovita Mcdermott, DO     • folic acid  1 mg Oral Daily Jovita Mcdermott, DO     • guaiFENesin  600 mg Oral Q12H [address] Karolyn Sánchez PA-C     • heparin (porcine)  5,000 Units Subcutaneous [facility] Karol Lara, DO     • levothyroxine  125 mcg Oral Q24H Oluwatomisin Kvng Santacruz MD     • metoclopramide  10 mg Intravenous Q6H PRN Zeinab Radford MD     • midodrine  10 mg Oral TID AC TOLU Orellana     • ondansetron  4 mg Intravenous Q6H PRN Giuseppeinda Tony, DO     • oxyCODONE  5 mg Oral Q6H Zeinab Radford MD     • pantoprazole  40 mg Oral Early Morning Stoney Marroquin MD     • polyethylene glycol  17 g Per PEG Tube BID Rolinda Dopp, DO     • senna  2 tablet Oral HS Loida Tenorio MD     • sodium chloride  2 g Oral TID Ekta Delatorre, DO     • thiamine  100 mg Oral Daily Jovita Mcdermott, DO     • traZODone  150 mg Oral HS Rolindsamia Villelap, DO          Today, Patient Was Seen By: Stoney Marroquin    **Please Note: This note may have been constructed using a voice recognition system  **

## 2022-10-22 NOTE — PROGRESS NOTES
NEPHROLOGY PROGRESS NOTE   Lucho Hartman 76 y o  female MRN: [id number]  Unit/Bed#: [facility] 323-01 Encounter: [id number]  Reason for Consult: NOAH    ASSESSMENT AND PLAN:  NOAH on CKD stage 3, baseline creatinine 0 8 to 1 2 since early 2022, more recently creatinine seems to have plateaued around 1 4 to 1 6 since early September 2022  -creatinine 1 8 on 10/19/22  No labs available since then  Patient has been refusing labs and also difficult stick    Creatinine seems to have plateaued 1 7 to 1 8 over last one week  -BMP results to follow today, discussed with floor nurse  -status post IV albumin couple days ago  -GFR likely overestimated given poor muscle mass  -UA earlier this month showed 1+ proteinuria, 4 to 10 RBCs/WBCs   -elevated creatinine suspected to be secondary to UTI, infection issues  Another differential remains AIN  -noted peripheral eosinophilia, urine eosinophil 5%, no new rash  -FeNa 2 4%  -ideally would like to change Protonix to Pepcid although remains on Protonix due to recent EGD showing GJ anastomosis ulcer      Hyponatremia  -sodium level overall improving 135 on 10/19/22  BMP results to follow today   Patient has been refusing labs  -suspect in the setting of poor solute intake  -workup includes urine sodium 99, urine osmolality 415, serum osmolality 283  -currently on salt tablet 2 g p o  T i d , has refused salt tablet intermittently  -BMP in a kaylie Restrepo -she gets free water flushes 125 mL Q four hourly with tube feeds      Chronic hypotension, currently on midodrine 10 mg t i d , continue same      Metabolic acidosis, now remains off bicarb supplements, bicarb level 23, no BMP available today  BMP results to follow      Initial ESBL Klebsiella, Proteus UTI, status post antibiotic  Recent C diff infection, completed C diff prophylaxis  Malnutrition, on tube feeds     Anemia with recent GI bleed, transfuse p r n   For hemoglobin less than seven   Continue to monitor  -recent EGD in September 2022 showed large cratered ulcer in [address] anastomosis, GI follow-up  -iron saturation 17%     Patient was found not to have capacity to make informed medical decisions  Status post gastric bypass eventual anastomotic ulcer, severe malnutrition, status post PEG tube placed, GI follow-up      Discussed above plan in detail with primary team     SUBJECTIVE:  Patient seen and examined at bedside  Denies any chest pain, shortness of breath, nausea vomiting      OBJECTIVE:  Current Weight: Weight - Scale: 49 4 kg (109 lb)  Vitals:    10/22/22 0758   BP: 119/56   Pulse: 90   Resp: 16   Temp: 98 8 °F (37 1 °C)   SpO2: 92%       Intake/Output Summary (Last 24 hours) at 10/22/2022 4742  Last data filed at 10/22/2022 0615  Gross per 24 hour   Intake --   Output 1050 ml   Net -1050 ml     Wt Readings from Last 3 Encounters:   10/18/22 49 4 kg (109 lb)   08/09/22 59 6 kg (131 lb 6 4 oz)   08/02/22 52 2 kg (115 lb)     Temp Readings from Last 3 Encounters:   10/22/22 98 8 °F (37 1 °C) (Oral)   08/09/22 99 3 °F (37 4 °C) (Oral)   07/20/22 98 4 °F (36 9 °C) (Temporal)     BP Readings from Last 3 Encounters:   10/22/22 119/56   08/09/22 130/61   07/20/22 103/57     Pulse Readings from Last 3 Encounters:   10/22/22 90   08/09/22 91   07/20/22 76        Physical Examination:  General:  Lying in bed, no acute distress   Eyes:  Mild conjunctival pallor present  ENT:  External examination of ears and nose unremarkable  Neck:  No obvious lymphadenopathy appreciated  Respiratory:  Bilateral air entry present  CVS:  S1, S2 present  GI:  Soft, nondistended  CNS:  Active, alert, follows commands  Skin:  No new rash  Musculoskeletal:  No obvious new gross deformity noted    Medications:    Current Facility-Administered Medications:   •  acetaminophen (TYLENOL) tablet 975 mg, 975 mg, Oral, Q6H PRN, Inga Mathew PA-C, 975 mg at 09/26/22 2315  •  ALPRAZolam (XANAX) tablet 0 25 mg, 0 25 mg, Oral, TID PRN, Uriel Floyd DO, 0 25 mg at 09/24/22 1616  •  bisacodyl (DULCOLAX) rectal suppository 10 mg, 10 mg, Rectal, Daily PRN, La Albert MD  •  clonazePAM (KlonoPIN) tablet 3 mg, 3 mg, Oral, HS, Joviatkash Mcdermott, DO, 3 mg at 36/25/60 3439  •  folic acid (FOLVITE) tablet 1 mg, 1 mg, Oral, Daily, Jovitakash Mcdermott, DO, 1 mg at 10/22/22 9075  •  guaiFENesin (MUCINEX) 12 hr tablet 600 mg, 600 mg, Oral, Q12H [address], Arash Luevano PA-C, 600 mg at 10/22/22 4646  •  heparin (porcine) subcutaneous injection 5,000 Units, 5,000 Units, Subcutaneous, Q8H [address], Prenirish Multani, DO, 5,000 Units at 10/21/22 2243  •  levothyroxine tablet 125 mcg, 125 mcg, Oral, Q24H, Oluwatomisin Marisa Rodriguez MD, 125 mcg at 10/21/22 1609  •  metoclopramide (REGLAN) injection 10 mg, 10 mg, Intravenous, Q6H PRN, La Albert MD  •  midodrine (PROAMATINE) tablet 10 mg, 10 mg, Oral, TID AC, TOLU Orellana, 10 mg at 10/22/22 0916  •  ondansetron (ZOFRAN) injection 4 mg, 4 mg, Intravenous, Q6H PRN, Jovita Mcdermott DO, 4 mg at 09/18/22 1339  •  oxyCODONE (ROXICODONE) IR tablet 5 mg, 5 mg, Oral, Q6H, La Albert MD, 5 mg at 10/22/22 0140  •  pantoprazole (PROTONIX) EC tablet 40 mg, 40 mg, Oral, Early Morning, Patti Rowley MD, 40 mg at 10/22/22 3065  •  polyethylene glycol (MIRALAX) packet 17 g, 17 g, Per PEG Tube, BID, Jovita Mcdermott, DO, 17 g at 10/19/22 2002  •  senna (SENOKOT) tablet 17 2 mg, 2 tablet, Oral, HS, Lito Mora MD, 17 2 mg at 10/21/22 2243  •  sodium chloride tablet 2 g, 2 g, Oral, TID, Toula Saint, DO, 2 g at 10/22/22 2967  •  thiamine tablet 100 mg, 100 mg, Oral, Daily, Jovita Mcdermott DO, 100 mg at 10/22/22 4858  •  traZODone (DESYREL) tablet 150 mg, 150 mg, Oral, HS, Jovita Mcdermott DO, 150 mg at 10/21/22 2243    Laboratory Results:  Results from last 7 days   Lab Units 10/19/22  0656 10/17/22  1629 10/15/22  1049   WBC Thousand/uL 9 84 12 74* 12 14*   HEMOGLOBIN g/dL 7 3* 8 0* 7 9*   HEMATOCRIT % 23 3* 25 4* 26 4*   PLATELETS Thousands/uL 445* 441* 488*   SODIUM mmol/L 135 132* 133*   POTASSIUM mmol/L 4 2 4 2 4 3   CHLORIDE mmol/L 106 100 102   CO2 mmol/L 23 28 26   BUN mg/dL 36* 34* 35*   CREATININE mg/dL 1 84* 1 77* 1 80*   CALCIUM mg/dL 10 0 10 2* 9 3       CT head wo contrast   Final Result by Brianna Banuelos MD (10/11 1651)      No acute intracranial abnormality  Workstation performed: [id number]         CT spine cervical wo contrast   Final Result by Brianna Banuelos MD (10/11 1654)      No acute cervical spine fracture or traumatic malalignment  Workstation performed: [id number]         CT chest abdomen pelvis w contrast   Final Result by Molly Vieira MD (09/27 1215)      1  Patchy and groundglass opacities in the upper lobes  The appearance is most typical for pulmonary edema though in the setting of systemic inflammatory response syndrome, atypical infection is not excluded  2   Small bilateral pleural effusions and trace ascites  3   Focal thickening of the lower portion of the endometrium  While this is not expected in a patient of this age, this appears stable from 2016 suggesting a benign etiology  If there is clinical concern, follow-up ultrasound is recommended  4   Cholelithiasis  5   Small left upper lobe nodules, one of which is new from March 2022  Given the history of smoking, a follow-up chest CT is recommended in 12 months  The study was marked in EPIC for significant notification  Workstation performed: [id number]         VAS upper limb venous duplex scan, complete, bilateral   Final Result by Sherry Almaraz MD (09/25 2039)      VAS lower limb venous duplex study, complete bilateral   Final Result by Sherry Almaraz MD (09/25 2039)      CT abdomen pelvis wo contrast   Final Result by Derrick Gonsales MD (09/23 1535)      1  Cholelithiasis with mild gallbladder luminal distention  Note is made of patient's elevated alkaline phosphatase  Ultrasound was recently performed (September 21, 2022), and correlation for clinical signs of acute cholecystitis is recommended  2   Large colonic stool burden without bowel obstruction  3   Trace bilateral pleural effusions  Workstation performed: [id number]         XR chest portable   Final Result by Gregorio Fermin MD (09/23 1437)      Increased interstitial markings, improved from 9/6/2022, question interstitial edema or infectious/inflammatory  Workstation performed: [id number]         US right upper quadrant with liver dopplers   Final Result by Jessi Saxena MD (09/22 8663)      Cholelithiasis without findings of acute cholecystitis  Workstation performed: [id number]         XR abdomen 1 view kub   Final Result by Sara Maloney MD (09/15 0802)      No acute findings  Significant colon stool, correlate clinically for constipation  Workstation performed: [id number]         XR chest portable   Final Result by Zachary Fernandez DO (09/07 1103)      Right-sided PICC tip projects at the right subclavian vein  Mild bilateral peribronchial thickening, possibly related to chronic bronchitis  No acute infiltrates  Minimal linear atelectasis or scarring in the mid lungs bilaterally  Workstation performed: [id number]         CT abdomen pelvis with contrast   Final Result by Wily Chin MD (34/47 9754)      1  Diffuse wall thickening of the colon with surrounding fat stranding suspicious for colitis i e  infectious or inflammatory  2   Mild circumferential wall thickening of the urinary bladder  Correlate with urinalysis for possible cystitis  3   Cholelithiasis  The study was marked in [facility] for immediate notification  Workstation performed: [id number]             Portions of the record may have been created with voice recognition software   Occasional wrong word or "sound a like" substitutions may have occurred due to the inherent limitations of voice recognition software  Read the chart carefully and recognize, using context, where substitutions have occurred

## 2022-10-22 NOTE — PLAN OF CARE
Problem: INFECTION - ADULT  Goal: Absence or prevention of progression during hospitalization  Description: INTERVENTIONS:  - Assess and monitor for signs and symptoms of infection  - Monitor lab/diagnostic results  - Monitor all insertion sites, i e  indwelling lines, tubes, and drains  - Monitor endotracheal if appropriate and nasal secretions for changes in amount and color  - [place] appropriate cooling/warming therapies per order  - Administer medications as ordered  - Instruct and encourage patient and family to use good hand hygiene technique  - Identify and instruct in appropriate isolation precautions for identified infection/condition  Outcome: Progressing

## 2022-10-22 NOTE — ASSESSMENT & PLAN NOTE
· Initially met criteria with fever, tachypnea, tachycardia, and leukocytosis on admission  Sepsis secondary to recurrent bacteremia and PICC line infections  · PICC line has been removed and patient had PEG tube placed for nutritional support  · Finished full course of ertapenem for ESBL UTI  · Patient with recurrent fever 10/7/22- Started empiric vancomycin whic was dced  · Discussed with infectious disease     observe off of antibiotic , cultures- sent on 10/9/22- negative

## 2022-10-23 NOTE — PROGRESS NOTES
NEPHROLOGY PROGRESS NOTE   Lars Mane 76 y o  female MRN: [id number]  Unit/Bed#: [facility] [place] 323-01 Encounter: [id number]  Reason for Consult: NOAH    ASSESSMENT AND PLAN:  NOAH on CKD stage 3, baseline creatinine 0 8 to 1 2 since early 2022, more recently creatinine seems to have plateaued around 1 4 to 1 6 since early September 2022  -creatinine 1 9 today samia Sifuentes has been refusing labs intermittently and also difficult stick    Creatinine seems to have plateaued 1 7 to 1 8 over last one week  -will give IV albumin 25 g two doses today  -GFR likely overestimated given poor muscle mass  -UA earlier this month showed 1+ proteinuria, 4 to 10 RBCs/WBCs   -elevated creatinine suspected to be secondary to UTI, infection issues  Another differential remains AIN  -noted peripheral eosinophilia, urine eosinophil 5%, no new rash  -FeNa 2 4%  -ideally would like to change Protonix to Pepcid although remains on Protonix due to recent EGD showing GJ anastomosis ulcer      Hyponatremia  -sodium level relatively stable 134 today    -suspect in the setting of poor solute intake  -workup includes urine sodium 99, urine osmolality 415, serum osmolality 283  -currently on salt tablet 2 g p o  T i d ,  -BMP in a kaylie Okeefe -she gets free water flushes 125 mL Q four hourly with tube feeds      Chronic hypotension, currently on midodrine 10 mg t i d , continue same      Initial ESBL Klebsiella, Proteus UTI, status post antibiotic  Recent C diff infection, completed C diff prophylaxis  Malnutrition, on tube feeds    Mild hypercalcemia, check ionized calcium, PTH, phosphorus, vitamin-D 25 hydroxy level in a kaylie Okeefe Avoid all calcium supplements     Anemia with recent GI bleed, transfuse p r n   For hemoglobin less than seven   Continue to monitor  -recent EGD in September 2022 showed large cratered ulcer in [address] anastomosis, GI follow-up  -iron saturation 17%     Patient was found not to have capacity to make informed medical decisions  Status post gastric bypass eventual anastomotic ulcer, severe malnutrition, status post PEG tube placed, GI follow-up      Discussed above plan in detail with primary team     SUBJECTIVE:  Patient seen and examined at bedside  Denies chest pain, shortness of breath, nausea      OBJECTIVE:  Current Weight: Weight - Scale: 49 4 kg (109 lb)  Vitals:    10/23/22 0700   BP: 118/52   Pulse: 87   Resp: 18   Temp: 98 4 °F (36 9 °C)   SpO2: 93%     No intake or output data in the 24 hours ending 10/23/22 0944  Wt Readings from Last 3 Encounters:   10/18/22 49 4 kg (109 lb)   08/09/22 59 6 kg (131 lb 6 4 oz)   08/02/22 52 2 kg (115 lb)     Temp Readings from Last 3 Encounters:   10/23/22 98 4 °F (36 9 °C) (Oral)   08/09/22 99 3 °F (37 4 °C) (Oral)   07/20/22 98 4 °F (36 9 °C) (Temporal)     BP Readings from Last 3 Encounters:   10/23/22 118/52   08/09/22 130/61   07/20/22 103/57     Pulse Readings from Last 3 Encounters:   10/23/22 87   08/09/22 91   07/20/22 76        Physical Examination:  General:  Lying in bed, no acute distress   Eyes:  Mild conjunctival pallor present  ENT:  External examination of ears and nose unremarkable  Neck:  No obvious lymphadenopathy appreciated  Respiratory:  Bilateral air entry present  CVS:  S1, S2 present  GI:  Soft, nondistended  CNS:  Active alert, follows commands  Skin:  No new rash  Musculoskeletal:  No obvious new gross deformity noted    Medications:    Current Facility-Administered Medications:   •  acetaminophen (TYLENOL) tablet 975 mg, 975 mg, Oral, Q6H PRN, Liss Abreu PA-C, 975 mg at 09/26/22 2315  •  ALPRAZolam (XANAX) tablet 0 25 mg, 0 25 mg, Oral, TID PRN, Sangeetha Huddleston DO, 0 25 mg at 09/24/22 1616  •  bisacodyl (DULCOLAX) rectal suppository 10 mg, 10 mg, Rectal, Daily PRN, Blane Darden MD  •  clonazePAM (KlonoPIN) tablet 3 mg, 3 mg, Oral, HS, Jovita Mcdermott DO, 2 mg at 10/82/07 5324  •  folic acid (FOLVITE) tablet 1 mg, 1 mg, Oral, Daily, Jovita Baldemar DO, 1 mg at 10/23/22 6476  •  guaiFENesin (MUCINEX) 12 hr tablet 600 mg, 600 mg, Oral, Q12H [facility], Liss Abreu PA-C, 600 mg at 10/23/22 6248  •  heparin (porcine) subcutaneous injection 5,000 Units, 5,000 Units, Subcutaneous, Q8H [facility], Sangeetha Huddleston DO, 5,000 Units at 10/23/22 0630  •  levothyroxine tablet 125 mcg, 125 mcg, Oral, Q24H, Oluwatomisin Jeremy Cordova MD, 125 mcg at 10/22/22 1612  •  metoclopramide (REGLAN) injection 10 mg, 10 mg, Intravenous, Q6H PRN, Blane Darden MD  •  midodrine (PROAMATINE) tablet 10 mg, 10 mg, Oral, TID AC, TOLU Orellana, 10 mg at 10/23/22 0630  •  ondansetron (ZOFRAN) injection 4 mg, 4 mg, Intravenous, Q6H PRN, Jovita Mcdermott DO, 4 mg at 09/18/22 1339  •  oxyCODONE (ROXICODONE) IR tablet 5 mg, 5 mg, Oral, Q6H, Blane Darden MD, 5 mg at 10/23/22 8631  •  pantoprazole (PROTONIX) EC tablet 40 mg, 40 mg, Oral, Early Morning, Cristhian Story MD, 40 mg at 10/23/22 4310  •  polyethylene glycol (MIRALAX) packet 17 g, 17 g, Per PEG Tube, BID, Jovita Mcdermott DO, 17 g at 10/19/22 2002  •  senna (SENOKOT) tablet 17 2 mg, 2 tablet, Oral, HS, Josephine Curiel MD, 17 2 mg at 10/22/22 2157  •  sodium chloride tablet 2 g, 2 g, Oral, TID, Yuriy Frederick DO, 2 g at 10/23/22 1984  •  thiamine tablet 100 mg, 100 mg, Oral, Daily, Jovita Mcdermott DO, 100 mg at 10/23/22 9869  •  traZODone (DESYREL) tablet 150 mg, 150 mg, Oral, HS, Jovita Mcdermott, DO, 150 mg at 10/22/22 8563    Laboratory Results:  Results from last 7 days   Lab Units 10/23/22  0233 10/19/22  0656 10/17/22  1629   WBC Thousand/uL  --  9 84 12 74*   HEMOGLOBIN g/dL  --  7 3* 8 0*   HEMATOCRIT %  --  23 3* 25 4*   PLATELETS Thousands/uL  --  445* 441*   SODIUM mmol/L 134* 135 132*   POTASSIUM mmol/L 3 8 4 2 4 2   CHLORIDE mmol/L 103 106 100   CO2 mmol/L 28 23 28   BUN mg/dL 43* 36* 34*   CREATININE mg/dL 1 90* 1 84* 1 77*   CALCIUM mg/dL 10 4* 10 0 10 2*       CT head wo contrast   Final Result by Reginald Salgado, MD (10/11 1651)      No acute intracranial abnormality  Workstation performed: [id number]         CT spine cervical wo contrast   Final Result by Devyn Meeks MD (10/11 1654)      No acute cervical spine fracture or traumatic malalignment  Workstation performed: [id number]         CT chest abdomen pelvis w contrast   Final Result by Sheree Gonsalez MD (09/27 1215)      1  Patchy and groundglass opacities in the upper lobes  The appearance is most typical for pulmonary edema though in the setting of systemic inflammatory response syndrome, atypical infection is not excluded  2   Small bilateral pleural effusions and trace ascites  3   Focal thickening of the lower portion of the endometrium  While this is not expected in a patient of this age, this appears stable from 2016 suggesting a benign etiology  If there is clinical concern, follow-up ultrasound is recommended  4   Cholelithiasis  5   Small left upper lobe nodules, one of which is new from March 2022  Given the history of smoking, a follow-up chest CT is recommended in 12 months  The study was marked in EPIC for significant notification  Workstation performed: [id number]         VAS upper limb venous duplex scan, complete, bilateral   Final Result by Emerita Mckeon MD (09/25 2039)      VAS lower limb venous duplex study, complete bilateral   Final Result by Emerita Mckeon MD (09/25 2039)      CT abdomen pelvis wo contrast   Final Result by Lisa Meraz MD (09/23 1535)      1  Cholelithiasis with mild gallbladder luminal distention  Note is made of patient's elevated alkaline phosphatase  Ultrasound was recently performed (September 21, 2022), and correlation for clinical signs of acute cholecystitis is recommended  2   Large colonic stool burden without bowel obstruction  3   Trace bilateral pleural effusions           Workstation performed: [id number] XR chest portable   Final Result by Adolfo Rutherford MD (09/23 9987)      Increased interstitial markings, improved from 9/6/2022, question interstitial edema or infectious/inflammatory  Workstation performed: [id number]         US right upper quadrant with liver dopplers   Final Result by Carolina Lowe MD (09/22 5236)      Cholelithiasis without findings of acute cholecystitis  Workstation performed: [id number]         XR abdomen 1 view kub   Final Result by Fermin Landon MD (09/15 0802)      No acute findings  Significant colon stool, correlate clinically for constipation  Workstation performed: [id number]         XR chest portable   Final Result by Deandre Flores DO (09/07 1103)      Right-sided PICC tip projects at the right subclavian vein  Mild bilateral peribronchial thickening, possibly related to chronic bronchitis  No acute infiltrates  Minimal linear atelectasis or scarring in the mid lungs bilaterally  Workstation performed: [id number]         CT abdomen pelvis with contrast   Final Result by Charlotte Gramajo MD (89/47 1907)      1  Diffuse wall thickening of the colon with surrounding fat stranding suspicious for colitis i e  infectious or inflammatory  2   Mild circumferential wall thickening of the urinary bladder  Correlate with urinalysis for possible cystitis  3   Cholelithiasis  The study was marked in [facility] for immediate notification  Workstation performed: [id number]             Portions of the record may have been created with voice recognition software  Occasional wrong word or "sound a like" substitutions may have occurred due to the inherent limitations of voice recognition software  Read the chart carefully and recognize, using context, where substitutions have occurred

## 2022-10-23 NOTE — ASSESSMENT & PLAN NOTE
· Patient has waxing waning mental status   · Cognition and mental status issues multifactorial in nature stemming from prolonged illness, numerous hospitalizations, chronic malnutrition, and chronic/recurrent infections    · Monitor clinically  · Delirium precautions  · Neuropsych consulted for capacity eval-patient does not have capacity per neuropsych  · Given history of bipolar disorder, psychiatric consult requested to ensure this has been optimized - his saw patient on 10/19, no changes necessary at this time  · Paranoid about seeing a large group of people in her room who came in through the window, otherwise stable mental status 10/23

## 2022-10-23 NOTE — ASSESSMENT & PLAN NOTE
· Evaluated by palliative care  Wants to continue medical directed treatments with limits of DNR/DNI  · Spoke with patient's brother Bon Crowell on 10/11/22 who is power of  ( but financial power of ), We discussed goals of care  Brother, Bon Crowell agrees that we need to address palliative/comfort option again given her failure to thrive  He will discuss with rest of the siblings and they all will discussed with patient  In next few days he will update us wether they  would like to have a meeting with palliative/hospice again  10/13/22 Called [facility]  for f/u- no response  Called LUCIA- reji Locke- wife responded- explained to her that I had spoken with Bon Crowell and to see if they had a family meeting  Also  patient did express that she feels comfort care is the route to go- reached out to Palliative team for follow up discussion on [address] and family meeting  10/14/22  Palliative team spoke with patient who today stated otherwise that she wants to do everything   Also Neuropsychiatry had evaluated patient and deemed no capacity, hence Palliative team has arranged a family meeting for Monday      10/17/22:  Per report, goals of care discussion was done and consensus is that patient will continue therapeutic treatment, will not transition to hospice at this point

## 2022-10-23 NOTE — ASSESSMENT & PLAN NOTE
· Renal function appears to have stabilized around creatinine of 1 5-1 7  · Patient intermittently refusing labs, trend as able  · Possibly new baseline per Nephrology  · Creatinine up to 1 9 on labs 10/23, rehydration per Renal

## 2022-10-23 NOTE — PLAN OF CARE
Problem: Potential for Falls  Goal: Patient will remain free of falls  Description: INTERVENTIONS:  - Educate patient/family on patient safety including physical limitations  - Instruct patient to call for assistance with activity   - Consult OT/PT to assist with strengthening/mobility   - Keep Call bell within reach  - Keep bed low and locked with side rails adjusted as appropriate  - Keep care items and personal belongings within reach  - Initiate and maintain comfort rounds  - Make Fall Risk Sign visible to staff  - Offer Toileting every  Hours, in advance of need  - Initiate/Maintain alarm  - Obtain necessary fall risk management equipment:   - Apply yellow socks and bracelet for high fall risk patients  - Consider moving patient to room near nurses station  Outcome: Progressing     Problem: MOBILITY - ADULT  Goal: Maintain or return to baseline ADL function  Description: INTERVENTIONS:  -  Assess patient's ability to carry out ADLs; assess patient's baseline for ADL function and identify physical deficits which impact ability to perform ADLs (bathing, care of mouth/teeth, toileting, grooming, dressing, etc )  - Assess/evaluate cause of self-care deficits   - Assess range of motion  - Assess patient's mobility; develop plan if impaired  - Assess patient's need for assistive devices and provide as appropriate  - Encourage maximum independence but intervene and supervise when necessary  - Involve family in performance of ADLs  - Assess for home care needs following discharge   - Consider OT consult to assist with ADL evaluation and planning for discharge  - Provide patient education as appropriate  Outcome: Progressing  Goal: Maintains/Returns to pre admission functional level  Description: INTERVENTIONS:  - Perform BMAT or MOVE assessment daily    - Set and communicate daily mobility goal to care team and patient/family/caregiver     - Collaborate with rehabilitation services on mobility goals if consulted  - Perform Range of Motion  times a day  - Reposition patient every  hours    - Dangle patient  times a day  - Stand patient  times a day  - Ambulate patient  times a day  - Out of bed to chair  times a day   - Out of bed for meals  times a day  - Out of bed for toileting  - Record patient progress and toleration of activity level   Outcome: Progressing     Problem: INFECTION - ADULT  Goal: Absence or prevention of progression during hospitalization  Description: INTERVENTIONS:  - Assess and monitor for signs and symptoms of infection  - Monitor lab/diagnostic results  - Monitor all insertion sites, i e  indwelling lines, tubes, and drains  - Monitor endotracheal if appropriate and nasal secretions for changes in amount and color  - Ray appropriate cooling/warming therapies per order  - Administer medications as ordered  - Instruct and encourage patient and family to use good hand hygiene technique  - Identify and instruct in appropriate isolation precautions for identified infection/condition  Outcome: Progressing  Goal: Absence of fever/infection during neutropenic period  Description: INTERVENTIONS:  - Monitor WBC    Outcome: Progressing     Problem: SAFETY ADULT  Goal: Patient will remain free of falls  Description: INTERVENTIONS:  - Educate patient/family on patient safety including physical limitations  - Instruct patient to call for assistance with activity   - Consult OT/PT to assist with strengthening/mobility   - Keep Call bell within reach  - Keep bed low and locked with side rails adjusted as appropriate  - Keep care items and personal belongings within reach  - Initiate and maintain comfort rounds  - Make Fall Risk Sign visible to staff  - Offer Toileting every  Hours, in advance of need  - Initiate/Maintain alarm  - Obtain necessary fall risk management equipment:   - Apply yellow socks and bracelet for high fall risk patients  - Consider moving patient to room near nurses station  Outcome: Progressing  Goal: Maintain or return to baseline ADL function  Description: INTERVENTIONS:  -  Assess patient's ability to carry out ADLs; assess patient's baseline for ADL function and identify physical deficits which impact ability to perform ADLs (bathing, care of mouth/teeth, toileting, grooming, dressing, etc )  - Assess/evaluate cause of self-care deficits   - Assess range of motion  - Assess patient's mobility; develop plan if impaired  - Assess patient's need for assistive devices and provide as appropriate  - Encourage maximum independence but intervene and supervise when necessary  - Involve family in performance of ADLs  - Assess for home care needs following discharge   - Consider OT consult to assist with ADL evaluation and planning for discharge  - Provide patient education as appropriate  Outcome: Progressing  Goal: Maintains/Returns to pre admission functional level  Description: INTERVENTIONS:  - Perform BMAT or MOVE assessment daily    - Set and communicate daily mobility goal to care team and patient/family/caregiver  - Collaborate with rehabilitation services on mobility goals if consulted  - Perform Range of Motion  times a day  - Reposition patient every  hours    - Dangle patient  times a day  - Stand patient  times a day  - Ambulate patient  times a day  - Out of bed to chair  times a day   - Out of bed for meals  times a day  - Out of bed for toileting  - Record patient progress and toleration of activity level   Outcome: Progressing     Problem: DISCHARGE PLANNING  Goal: Discharge to home or other facility with appropriate resources  Description: INTERVENTIONS:  - Identify barriers to discharge w/patient and caregiver  - Arrange for needed discharge resources and transportation as appropriate  - Identify discharge learning needs (meds, wound care, etc )  - Arrange for interpretive services to assist at discharge as needed  - Refer to Case Management Department for coordinating discharge planning if the patient needs post-hospital services based on physician/advanced practitioner order or complex needs related to functional status, cognitive ability, or social support system  Outcome: Progressing     Problem: Knowledge Deficit  Goal: Patient/family/caregiver demonstrates understanding of disease process, treatment plan, medications, and discharge instructions  Description: Complete learning assessment and assess knowledge base    Interventions:  - Provide teaching at level of understanding  - Provide teaching via preferred learning methods  Outcome: Progressing     Problem: SKIN/TISSUE INTEGRITY - ADULT  Goal: Skin Integrity remains intact(Skin Breakdown Prevention)  Description: Assess:  -Perform Gabe assessment every   -Clean and moisturize skin every   -Inspect skin when repositioning, toileting, and assisting with ADLS  -Assess under medical devices such as  every   -Assess extremities for adequate circulation and sensation     Bed Management:  -Have minimal linens on bed & keep smooth, unwrinkled  -Change linens as needed when moist or perspiring  -Avoid sitting or lying in one position for more than  hours while in bed  -Keep HOB at degrees     Toileting:  -Offer bedside commode  -Assess for incontinence every   -Use incontinent care products after each incontinent episode such as     Activity:  -Mobilize patient  times a day  -Encourage activity and walks on unit  -Encourage or provide ROM exercises   -Turn and reposition patient every  Hours  -Use appropriate equipment to lift or move patient in bed  -Instruct/ Assist with weight shifting every  when out of bed in chair  -Consider limitation of chair time  hour intervals    Skin Care:  -Avoid use of baby powder, tape, friction and shearing, hot water or constrictive clothing  -Relieve pressure over bony prominences using   -Do not massage red bony areas    Next Steps:  -Teach patient strategies to minimize risks such as    -Consider consults to  interdisciplinary teams such as   Outcome: Progressing  Goal: Incision(s), wounds(s) or drain site(s) healing without S/S of infection  Description: INTERVENTIONS  - Assess and document dressing, incision, wound bed, drain sites and surrounding tissue  - Provide patient and family education  - Perform skin care/dressing changes every   Outcome: Progressing  Goal: Pressure injury heals and does not worsen  Description: Interventions:  - Implement low air loss mattress or specialty surface (Criteria met)  - Apply silicone foam dressing  - Instruct/assist with weight shifting every  minutes when in chair   - Limit chair time to  hour intervals  - Use special pressure reducing interventions such as  when in chair   - Apply fecal or urinary incontinence containment device   - Perform passive or active ROM every   - Turn and reposition patient & offload bony prominences every  hours   - Utilize friction reducing device or surface for transfers   - Consider consults to  interdisciplinary teams such as   - Use incontinent care products after each incontinent episode  - Consider nutrition services referral as needed  Outcome: Progressing     Problem: Prexisting or High Potential for Compromised Skin Integrity  Goal: Skin integrity is maintained or improved  Description: INTERVENTIONS:  - Identify patients at risk for skin breakdown  - Assess and monitor skin integrity  - Assess and monitor nutrition and hydration status  - Monitor labs   - Assess for incontinence   - Turn and reposition patient  - Assist with mobility/ambulation  - Relieve pressure over bony prominences  - Avoid friction and shearing  - Provide appropriate hygiene as needed including keeping skin clean and dry  - Evaluate need for skin moisturizer/barrier cream  - Collaborate with interdisciplinary team   - Patient/family teaching  - Consider wound care consult   Outcome: Progressing     Problem: Nutrition/Hydration-ADULT  Goal: Nutrient/Hydration intake appropriate for improving, restoring or maintaining nutritional needs  Description: Monitor and assess patient's nutrition/hydration status for malnutrition  Collaborate with interdisciplinary team and initiate plan and interventions as ordered  Monitor patient's weight and dietary intake as ordered or per policy  Utilize nutrition screening tool and intervene as necessary  Determine patient's food preferences and provide high-protein, high-caloric foods as appropriate       INTERVENTIONS:  - Monitor oral intake, urinary output, labs, and treatment plans  - Assess nutrition and hydration status and recommend course of action  - Evaluate amount of meals eaten  - Assist patient with eating if necessary   - Allow adequate time for meals  - Recommend/ encourage appropriate diets, oral nutritional supplements, and vitamin/mineral supplements  - Order, calculate, and assess calorie counts as needed  - Recommend, monitor, and adjust tube feedings and TPN/PPN based on assessed needs  - Assess need for intravenous fluids  - Provide specific nutrition/hydration education as appropriate  - Include patient/family/caregiver in decisions related to nutrition  Outcome: Progressing

## 2022-10-24 PROBLEM — E83.52 HYPERCALCEMIA: Status: ACTIVE | Noted: 2022-10-24

## 2022-10-24 NOTE — PROGRESS NOTES
Follow up Consultation    Nephrology   Cameron Smith 76 y o  female MRN: [id number]  Unit/Bed#: [facility] 323-01 Encounter: [id number]      Physician Requesting Consult: Mario Weems,*        ASSESSMENT/PLAN:  73-year-old female with multiple comorbidities including bipolar disorder and CKD stage 3 presented with diarrhea and sepsis with prolonged hospital stay  Nephrology following for acute kidney injury  Acute kidney injury on CKD stage 3:  NOAH multifactorial most likely secondary to ischemic injury secondary to hemodynamic perturbations plus UTI/sepsis  [facility] as well as Care everywhere it appears that the patient has a baseline Creatinine of 0 8-1 2 mg/dL  patient was admitted with a creatinine of 1 35 mg/dL on 08/31/2022  patient's creatinine today is at 1 90 mg/dL, remains stable  More recently creatinine has leveled off around 1 7-1 8 mg/dL  Hold further diuretics and albumin for now  check BMP in a m  Await renal recovery  Optimize hemodynamic status to avoid delay in renal recovery  Place on a renal diet when allowed diet order  Avoid nephrotoxins, adjust meds to appropriate GFR  Strict I/O  Daily weights  Urinary retention protocol if patient does not have a Fraga  Most likely has underlying CKD secondary to age-related nephron loss  will need to set up patient for follow up with Nephrology as an outpatient post hospitalization  for nephrology as an outpatient patient follows up with no nephrologist  Patient found to have no capacity to make informed medical decisions  Blood pressure/chronic hypotension:  current medications:  Midodrine 10 mg p o  T i d   recommendations:  No changes for now  Optimize hemodynamics  Maintain MAP > 65mmHg  Avoid BP fluctuations  H/H/anemia:  most recent hemoglobin at  maintain hemoglobin greater than 8 grams/deciliter  Recent GI bleed  Status post recent EGD in September 2022    Currently has PEG tube for feeds     Acid-base electrolytes:    Electrolytes:    Stable  Hyponatremia:   Most recent sodium at 134 mEq  Continue salt tablets 2 g p o  T i d  Check BMP in a m  Maintains on free water flushes 125 cc q 4 as she has tube feeds  Hypokalemia:  Most recent potassium 3 4 mEq  Supplement and recheck    Hyper calcemia:  Corrected calcium 11 6  Continue to monitor for now  Most recent intact PTH less than 6 3  Ionized calcium 1 34, minimally elevated  Vitamin-D level pending  Hold all vitamin-D and calcium supplements for now  If serum calcium continues to rise consider IV fluids  Likely some contribution from decreased more mobility    Acid-base:    Most recent bicarb at 25 stable    Other medical problems:  Malnutrition:  Management per primary team   PEG tube in place  Follow-up with GI   UTI:  Management per primary team   Status post antibiotics      Thanks for the consult  Will continue to follow  Please call with questions/ concerns  Above-mentioned orders and Plan in terms of acute kidney injury was discussed with the team in depth    Haley Collins, 10/24/2022, 12:28 PM              Objective :   Patient seen and examined in her room no overnight events complaining of abdominal pain and distension, remains afebrile urine output 700 cc plus      PHYSICAL EXAM  /54   Pulse 94   Temp 98 6 °F (37 °C)   Resp 16   Ht 5' 4" (1 626 m)   Wt 49 4 kg (109 lb)   SpO2 91%   BMI 18 71 kg/m²   Temp (24hrs), Av 7 °F (37 1 °C), Min:98 6 °F (37 °C), Max:98 8 °F (37 1 °C)        Intake/Output Summary (Last 24 hours) at 10/24/2022 1228  Last data filed at 10/24/2022 0900  Gross per 24 hour   Intake 1286 ml   Output 405 ml   Net 881 ml       I/O last 24 hours:   In: 6834 [NG/GT:700; IV Piggyback:150; Feedings:826]  Out: 56 [Urine:700; Emesis/NG output:5]      Current Weight: Weight - Scale:  (patient bedbound, no bed scale)  First Weight: Weight - Scale: 61 2 kg (135 lb)  Physical Exam  Vitals and nursing note reviewed  Constitutional:       General: She is not in acute distress  Appearance: Normal appearance  She is normal weight  She is ill-appearing  She is not toxic-appearing or diaphoretic  HENT:      Head: Normocephalic and atraumatic  Mouth/Throat:      Mouth: Mucous membranes are moist       Pharynx: Oropharynx is clear  No oropharyngeal exudate  Eyes:      General: No scleral icterus  Conjunctiva/sclera: Conjunctivae normal    Cardiovascular:      Rate and Rhythm: Normal rate  Heart sounds: No friction rub  Pulmonary:      Breath sounds: Normal breath sounds  No stridor  Abdominal:      General: There is distension  Palpations: Abdomen is soft  Tenderness: There is no abdominal tenderness  Comments: PEG tube in place   Musculoskeletal:         General: No swelling  Cervical back: Normal range of motion and neck supple  No rigidity  Skin:     General: Skin is warm  Coloration: Skin is not jaundiced  Neurological:      General: No focal deficit present  Mental Status: She is alert  Psychiatric:         Mood and Affect: Mood normal              Review of Systems   Constitutional: Negative for chills and fever  HENT: Negative for congestion  Respiratory: Negative for cough, shortness of breath and wheezing  Cardiovascular: Negative for leg swelling  Gastrointestinal: Positive for abdominal distention and abdominal pain  Negative for vomiting  Genitourinary: Negative for dysuria  Musculoskeletal: Negative for back pain  Neurological: Negative for headaches  Psychiatric/Behavioral: Negative for agitation  All other systems reviewed and are negative        Scheduled Meds:  Current Facility-Administered Medications   Medication Dose Route Frequency Provider Last Rate   • acetaminophen  975 mg Oral Q6H PRN Mele Duran PA-C     • ALPRAZolam  0 25 mg Oral TID PRN Nakita Sifuentes DO     • bisacodyl  10 mg Rectal Daily PRN Thalia Grey MD     • clonazePAM  3 mg Oral HS Jovita Mcdermott, DO     • folic acid  1 mg Oral Daily Jovita Mcdermott, DO     • guaiFENesin  600 mg Oral Q12H [facility] Saurabh Lozano PA-C     • heparin (porcine)  5,000 Units Subcutaneous [facility] Pat Weems, DO     • levothyroxine  125 mcg Oral Q24H [place] Mick Boss MD     • metoclopramide  10 mg Intravenous Q6H PRN Thalia Grey MD     • midodrine  10 mg Oral TID AC TOLU Orellana     • ondansetron  4 mg Intravenous Q6H PRN Geoff Chen DO     • oxyCODONE  5 mg Oral Q6H Thalia Grey MD     • pantoprazole  40 mg Oral Early [place] Charles MD     • polyethylene glycol  17 g Per PEG Tube BID Geoff Chen, DO     • senna  2 tablet Oral HS Renelle Riedel, MD     • sodium chloride  2 g Oral TID Kristy Foil, DO     • thiamine  100 mg Oral Daily Jovita Mcdermott, DO     • traZODone  150 mg Oral HS Jovita Mcdermott, DO         PRN Meds: •  acetaminophen  •  ALPRAZolam  •  bisacodyl  •  metoclopramide  •  ondansetron    Continuous Infusions:       Invasive Devices: Invasive Devices  Report    Peripheral Intravenous Line  Duration           Peripheral IV 10/23/22 Dorsal (posterior); Left Hand 1 day          Drain  Duration           Gastrostomy/Enterostomy Percutaneous Endoscopic Gastrostomy (PEG) 20 Fr  LUQ 45 days    External Urinary Catheter 3 days                  LABORATORY:    Results from last 7 days   Lab Units 10/24/22  0607 10/23/22  0233 10/19/22  0656 10/17/22  1629   WBC Thousand/uL  --   --  9 84 12 74*   HEMOGLOBIN g/dL  --   --  7 3* 8 0*   HEMATOCRIT %  --   --  23 3* 25 4*   PLATELETS Thousands/uL  --   --  445* 441*   POTASSIUM mmol/L 3 4* 3 8 4 2 4 2   CHLORIDE mmol/L 102 103 106 100   CO2 mmol/L 25 28 23 28   BUN mg/dL 44* 43* 36* 34*   CREATININE mg/dL 1 90* 1 90* 1 84* 1 77*   CALCIUM mg/dL 10 2* 10 4* 10 0 10 2*   PHOSPHORUS mg/dL 3 9  --   --   --       rest all reviewed    RADIOLOGY:  CT head wo contrast Final Result by Jose Alberto Mejia MD (57/59 [id number])      No acute intracranial abnormality  Workstation performed: [id number]         CT spine cervical wo contrast   Final Result by Jose Alberto Mejia MD (10/11 1654)      No acute cervical spine fracture or traumatic malalignment  Workstation performed: [id number]         CT chest abdomen pelvis w contrast   Final Result by Warden Johnnie MD (09/27 1215)      1  Patchy and groundglass opacities in the upper lobes  The appearance is most typical for pulmonary edema though in the setting of systemic inflammatory response syndrome, atypical infection is not excluded  2   Small bilateral pleural effusions and trace ascites  3   Focal thickening of the lower portion of the endometrium  While this is not expected in a patient of this age, this appears stable from 2016 suggesting a benign etiology  If there is clinical concern, follow-up ultrasound is recommended  4   Cholelithiasis  5   Small left upper lobe nodules, one of which is new from March 2022  Given the history of smoking, a follow-up chest CT is recommended in 12 months  The study was marked in EPIC for significant notification  Workstation performed: [id number]         VAS upper limb venous duplex scan, complete, bilateral   Final Result by Leroy Ferrell MD (09/25 2039)      VAS lower limb venous duplex study, complete bilateral   Final Result by Leroy Ferrell MD (09/25 2039)      CT abdomen pelvis wo contrast   Final Result by Erin Olivera MD (09/23 1535)      1  Cholelithiasis with mild gallbladder luminal distention  Note is made of patient's elevated alkaline phosphatase  Ultrasound was recently performed (September 21, 2022), and correlation for clinical signs of acute cholecystitis is recommended  2   Large colonic stool burden without bowel obstruction  3   Trace bilateral pleural effusions  Workstation performed: [id number]         XR chest portable   Final Result by Sunil Cedeno MD (09/23 1437)      Increased interstitial markings, improved from 9/6/2022, question interstitial edema or infectious/inflammatory  Workstation performed: [id number]         US right upper quadrant with liver dopplers   Final Result by Anu Rodriguez MD (09/22 1523)      Cholelithiasis without findings of acute cholecystitis  Workstation performed: [id number]         XR abdomen 1 view kub   Final Result by Juan Antonio Mejia MD (09/15 0802)      No acute findings  Significant colon stool, correlate clinically for constipation  Workstation performed: [id number]         XR chest portable   Final Result by Jessica Lerma DO (09/07 1103)      Right-sided PICC tip projects at the right subclavian vein  Mild bilateral peribronchial thickening, possibly related to chronic bronchitis  No acute infiltrates  Minimal linear atelectasis or scarring in the mid lungs bilaterally  Workstation performed: [id number]         CT abdomen pelvis with contrast   Final Result by Gurjit Harris MD (14/19 4927)      1  Diffuse wall thickening of the colon with surrounding fat stranding suspicious for colitis i e  infectious or inflammatory  2   Mild circumferential wall thickening of the urinary bladder  Correlate with urinalysis for possible cystitis  3   Cholelithiasis  The study was marked in [facility] for immediate notification  Workstation performed: [id number]           Rest all reviewed    Portions of the record may have been created with voice recognition software  Occasional wrong word or "sound a like" substitutions may have occurred due to the inherent limitations of voice recognition software  Read the chart carefully and recognize, using context, where substitutions have occurred  If you have any questions, please contact the dictating provider

## 2022-10-24 NOTE — OCCUPATIONAL THERAPY NOTE
Occupational Therapy CX        Patient Name: Pepito PHILLIPS Date: 10/24/2022       10/24/22 1717   OT Last Visit   OT Visit Date 10/24/22   Note Type   Note type Cancelled Session   Cancel Reasons Refusal   Additional Comments PT REFUSING OOB ACTIVITY DESPITE EDUCATION   WILL CONT TO FOLLOW AS AGREEABLE       Maryan Connollyk, MOT, OTR/L

## 2022-10-24 NOTE — PLAN OF CARE
Problem: Potential for Falls  Goal: Patient will remain free of falls  Description: INTERVENTIONS:  - Educate patient/family on patient safety including physical limitations  - Instruct patient to call for assistance with activity   - Consult OT/PT to assist with strengthening/mobility   - Keep Call bell within reach  - Keep bed low and locked with side rails adjusted as appropriate  - Keep care items and personal belongings within reach  - Initiate and maintain comfort rounds  - Make Fall Risk Sign visible to staff  - Offer Toileting every two Hours, in advance of need  - Initiate/Maintain bed alarm  - Obtain necessary fall risk management equipment: alarms  - Apply yellow socks and bracelet for high fall risk patients  - Consider moving patient to room near nurses station  Outcome: Progressing     Problem: SAFETY ADULT  Goal: Patient will remain free of falls  Description: INTERVENTIONS:  - Educate patient/family on patient safety including physical limitations  - Instruct patient to call for assistance with activity   - Consult OT/PT to assist with strengthening/mobility   - Keep Call bell within reach  - Keep bed low and locked with side rails adjusted as appropriate  - Keep care items and personal belongings within reach  - Initiate and maintain comfort rounds  - Make Fall Risk Sign visible to staff  - Offer Toileting every 2 Hours, in advance of need  - Initiate/Maintain bed alarm  - Obtain necessary fall risk management equipment: alarms  - Apply yellow socks and bracelet for high fall risk patients  - Consider moving patient to room near nurses station  Outcome: Progressing

## 2022-10-24 NOTE — PLAN OF CARE
Problem: PHYSICAL THERAPY ADULT  Goal: Performs mobility at highest level of function for planned discharge setting  See evaluation for individualized goals  Description:    Equipment Recommended:  (RW)       See flowsheet documentation for full assessment, interventions and recommendations  Outcome: Not Progressing  Note: Prognosis: Fair  Problem List: Decreased strength, Decreased endurance, Decreased mobility, Decreased coordination, Impaired balance, Decreased cognition, Pain, Decreased safety awareness  Assessment: Pt seen for session for setup, LE therex, repositioning  Pt lethargic, c/o fatigue and pain, and very limited participation today  Declined OOB/standing/gait  continue to recommend rehab at d/c, unless decision made for comfort/hospice measures as are being discussed  will follow  Barriers to Discharge: Inaccessible home environment, Decreased caregiver support     PT Discharge Recommendation: Post acute rehabilitation services    See flowsheet documentation for full assessment

## 2022-10-24 NOTE — PROGRESS NOTES
Pastoral Care Progress Note    10/24/2022  Patient: Andree Roberts : 1945  Admission Date & Time: 20229  MRN: [id number] [facility]: [id number]        Dee Melendrez provided communion, prayer and blessings to the patient  All needs are currently met at this time

## 2022-10-24 NOTE — WOUND OSTOMY CARE
Progress Note - Wound   Magi Rockwell 76 y o  female MRN: [id number]  Unit/Bed#: [facility] 323-01 Encounter: [id number]        Assessment:   Patient is seen for wound care follow-up  Min assist with turning and repositioning on p-500 Specialty Mattress  Incontinence of bowel and bladder managed with purewick  Patient is independent with meals but receives tube feed supplementation for poor nutrition intake  Patient is thin and frail in appearance  B/L heels are dry, intact, and kassidy  Findings:  1  POA Unstageable Sacrum Evolved to Stage 3 Pressure Injury: full thickness skin loss with a beefy red granulation tissue wound bed  Karyn-wound is dry and intact  Small serosanguineous drainage noted  TRIAD and Foam Dressing applied          No induration, fluctuance, odor, warmth/temperature differences, redness, or purulence noted to the above noted wounds and skin areas assessed  New dressings applied per orders listed below  Patient tolerated well- no s/s of non-verbal pain or discomfort observed during the encounter  Bedside nurse aware of plan of care  See flow sheets for more detailed assessment findings  Orders listed below and wound care will continue to follow, call or tiger text with questions  Skin care Plan:  1-Cleanse sacro-buttocks with soap and water  Apply Triad paste to wound bed and cover with Allevyn foam  Sami with T for treatment  Change everyday and PRN  2-Turn/reposition q2h or when medically stable for pressure re-distribution on skin   3-Elevate heels to offload pressure  4-Moisturize skin daily with skin nourishing cream  5-Ehob cushion in chair when out of bed  6-Hydraguard to bilateral heels BID and PRN  7-P-500 specialty mattress       WOUNDS:  Wound 07/26/22 Pressure Injury Sacrum Mid (Active)   Wound Image   10/24/22 1253   Wound Description Granulation tissue 10/24/22 1253   Pressure Injury Stage 3 10/24/22 1253   Karyn-wound Assessment Clean;Dry; Intact 10/24/22 1253   Wound Length (cm) 1 5 cm 10/24/22 1253   Wound Width (cm) 0 6 cm 10/24/22 1253   Wound Depth (cm) 0 2 cm 10/24/22 1253   Wound Surface Area (cm^2) 0 9 cm^2 10/24/22 1253   Wound Volume (cm^3) 0 18 cm^3 10/24/22 1253   Calculated Wound Volume (cm^3) 0 18 cm^3 10/24/22 1253   Change in Wound Size % 10 10/24/22 1253   Tunneling 0 cm 10/24/22 1253   Tunneling in depth located at 0 10/24/22 1253   Undermining 0 10/24/22 1253   Undermining is depth extending from 0 10/24/22 1253   Wound Site Closure ALICIA 10/24/22 1253   Drainage Amount Small 10/24/22 1253   Drainage Description Serosanguineous 10/24/22 1253   Non-staged Wound Description Full thickness 10/24/22 1253   Treatments Cleansed 10/24/22 1253   Dressing Foam, Silicon (eg  Allevyn, etc); Moisture barrier 10/24/22 1253   Wound packed? No 10/24/22 1253   Packing- # removed 0 10/24/22 1253   Packing- # inserted 0 10/24/22 1253   Dressing Changed New 10/24/22 1253   Patient Tolerance Tolerated well 10/24/22 1253   Dressing Status Clean;Dry; Intact 10/24/22 Angelica Valero 304 RN, BSN

## 2022-10-24 NOTE — PHYSICAL THERAPY NOTE
Physical Therapy Treatment Note       10/24/22 0955   PT Last Visit   PT Visit Date 10/24/22   Note Type   Note Type Treatment   Pain Assessment   Pain Assessment Tool 0-10   Pain Score 8   Pain Location/Orientation Location: Generalized; Location: Buttocks   Patient's Stated Pain Goal No pain   Restrictions/Precautions   Weight Bearing Precautions Per Order No   Other Precautions Contact/isolation;Multiple lines;Telemetry; Fall Risk;Pain;Cognitive; Chair Alarm; Bed Alarm   General   Chart Reviewed Yes   Family/Caregiver Present No   Cognition   Overall Cognitive Status Impaired   Arousal/Participation Lethargic;Arousable;Poorly responsive   Attention Attends with cues to redirect   Orientation Level Oriented to person   Memory Unable to assess   Following Commands Follows one step commands inconsistently   Subjective   Subjective states she is tired and uncomfortable  Bed Mobility   Rolling R 3  Moderate assistance   Additional items Assist x 1   Rolling L 3  Moderate assistance   Additional items Assist x 1   Additional Comments rolling to reposition to comfort   Endurance Deficit   Endurance Deficit Yes   Endurance Deficit Description weakness, fatigue, pain   Exercises   Balance training  supine therex aarom x 5-7 reps all planes  limited participation   Assessment   Prognosis Fair   Problem List Decreased strength;Decreased endurance;Decreased mobility; Decreased coordination; Impaired balance;Decreased cognition;Pain;Decreased safety awareness   Assessment Pt seen for session for setup, LE therex, repositioning  Pt lethargic, c/o fatigue and pain, and very limited participation today  Declined OOB/standing/gait  continue to recommend rehab at d/c, unless decision made for comfort/hospice measures as are being discussed  will follow     Goals   Patient Goals to rest   STG Expiration Date 11/07/22   Short Term Goal #1 1-2 wks: bed mob and transfers w/ S, standing balance to fair w/ device, ambulate  ft w/ RW and min A, increase B LE strength by 1/2-1 grade   PT Treatment Day 3   Plan   Treatment/Interventions Functional transfer training;LE strengthening/ROM; Therapeutic exercise; Endurance training;Equipment eval/education;Patient/family training;Bed mobility;Gait training   Progress Slow progress, medical status limitations   PT Frequency 1-2x/wk   Recommendation   PT Discharge Recommendation Post acute rehabilitation services   AM-PAC Basic Mobility Inpatient   Turning in Bed Without Bedrails 2   Lying on Back to Sitting on Edge of Flat Bed 2   Moving Bed to Chair 2   Standing Up From Chair 2   Walk in Room 2   Climb 3-5 Stairs 1   Basic Mobility Inpatient Raw Score 11   Basic Mobility Standardized Score 30 25   Highest Level Of Mobility   JH-HLM Goal 4: Move to chair/commode   JH-HLM Achieved 2: Bed activities/Dependent transfer   Mattie Preston PT, DPT CSRS

## 2022-10-24 NOTE — PROGRESS NOTES
10/24/22 1400   Clinical Encounter Type   Visited With Patient not available   Routine Visit Follow-up    Pastoral Care Progress Note    10/24/2022  Patient: Klaudia Serrato : 1945  Admission Date & Time: 2022  MRN: [id number] CSN: [id number]      Was referred to patient per Uziel Raymond due to patient being assigned to one of my floors  I had stopped and knocked on the patient's door for the second time today but the patient was asleep and not alert  I will check back again tomorrow

## 2022-10-25 NOTE — PLAN OF CARE
Problem: Potential for Falls  Goal: Patient will remain free of falls  Description: INTERVENTIONS:  - Educate patient/family on patient safety including physical limitations  - Instruct patient to call for assistance with activity   - Consult OT/PT to assist with strengthening/mobility   - Keep Call bell within reach  - Keep bed low and locked with side rails adjusted as appropriate  - Keep care items and personal belongings within reach  - Initiate and maintain comfort rounds  - Make Fall Risk Sign visible to staff  - Offer Toileting every  Hours, in advance of need  - Initiate/Maintain alarm  - Obtain necessary fall risk management equipment:   - Apply yellow socks and bracelet for high fall risk patients  - Consider moving patient to room near nurses station  Outcome: Progressing     Problem: SAFETY ADULT  Goal: Patient will remain free of falls  Description: INTERVENTIONS:  - Educate patient/family on patient safety including physical limitations  - Instruct patient to call for assistance with activity   - Consult OT/PT to assist with strengthening/mobility   - Keep Call bell within reach  - Keep bed low and locked with side rails adjusted as appropriate  - Keep care items and personal belongings within reach  - Initiate and maintain comfort rounds  - Make Fall Risk Sign visible to staff  - Offer Toileting every Hours, in advance of need  - Initiate/Maintain alarm  - Obtain necessary fall risk management equipment:   - Apply yellow socks and bracelet for high fall risk patients  - Consider moving patient to room near nurses station  Outcome: Progressing     Problem: SKIN/TISSUE INTEGRITY - ADULT  Goal: Skin Integrity remains intact(Skin Breakdown Prevention)  Description: Assess:  -Perform Gabe assessment every   -Clean and moisturize skin every   -Inspect skin when repositioning, toileting, and assisting with ADLS  -Assess under medical devices such as  every   -Assess extremities for adequate circulation and sensation     Bed Management:  -Have minimal linens on bed & keep smooth, unwrinkled  -Change linens as needed when moist or perspiring  -Avoid sitting or lying in one position for more than  hours while in bed  -Keep HOB at degrees     Toileting:  -Offer bedside commode  -Assess for incontinence every   -Use incontinent care products after each incontinent episode such as     Activity:  -Mobilize patient times a day  -Encourage activity and walks on unit  -Encourage or provide ROM exercises   -Turn and reposition patient every Hours  -Use appropriate equipment to lift or move patient in bed  -Instruct/ Assist with weight shifting every when out of bed in chair  -Consider limitation of chair time  hour intervals    Skin Care:  -Avoid use of baby powder, tape, friction and shearing, hot water or constrictive clothing  -Relieve pressure over bony prominences using   -Do not massage red bony areas    Next Steps:  -Teach patient strategies to minimize risks such as   -Consider consults to  interdisciplinary teams such as   Outcome: Progressing

## 2022-10-25 NOTE — ASSESSMENT & PLAN NOTE
· Recent hx of staph epi bacteremia in 7/2022, presumed due to picc line  · Recurrence 1/2 set staph epi, 2nd set without growth    Was treated with intravenous vancomycin for presumed line infection  · S/p TAVIA (9/9) no evidence of vegetation  · Blood cultures had been negative however repeated again due to isolated fever which are negative x 5 days   · PICC and TPN discontinued, now has PEG tube  · Monitor off abx-monitoring for fevers, up trending leukocytosis  · Leukocytosis stable

## 2022-10-25 NOTE — ASSESSMENT & PLAN NOTE
· Noted on labs 10/24  · Mild at 11 4>11 6 today PTH intact  · Nephrology following  · Hold all vitamin D and calcium supplements for now  · May consider IVF in does not improve  · Appreciate nephrology recs  · Lack of mobility could by playing a part

## 2022-10-25 NOTE — ASSESSMENT & PLAN NOTE
· TSH elevated and fT4 low  · Endocrinology consulted - levothyroxine timing was adjusted to 2pm per Endocrinology recs (4 hours after tube feeds have stopped running)  · Repeat TFTs in 1 week per Endocrine - TSH checked and elevated at 27, will need to discuss with Endocrine tomorrow how to manage this further

## 2022-10-25 NOTE — ASSESSMENT & PLAN NOTE
· TSH elevated and fT4 low  · Endocrinology consulted - levothyroxine timing was adjusted to 2pm per Endocrinology recs (4 hours after tube feeds have stopped running)  · Repeat TFTs in 1 week per Endocrine - TSH checked and elevated at 27 improved from 67 T4 now normal; message sent to endocrine for further rec

## 2022-10-25 NOTE — ASSESSMENT & PLAN NOTE
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

## 2022-10-25 NOTE — ASSESSMENT & PLAN NOTE
· Renal function appears to have stabilized around creatinine of 1 5-1 7  · Patient intermittently refusing labs, trend as able  · Possibly new baseline per Nephrology  · Creatinine up to  2 0 on labs 10/25, further management per Renal

## 2022-10-25 NOTE — PROGRESS NOTES
[address] [facility]  Progress Note - Everet Bethany 1945, 76 y o  female MRN: [id number]  Unit/Bed#: [facility] 323-01 Encounter: [id number]  Primary Care Provider: Eugenia العلي MD   Date and time admitted to hospital: 8/30/2022 11:49 PM    * Sepsis [facility])  Assessment & Plan  · Initially met criteria with fever, tachypnea, tachycardia, and leukocytosis on admission  Sepsis secondary to recurrent bacteremia and PICC line infections  · PICC line has been removed and patient had PEG tube placed for nutritional support  · Finished full course of ertapenem for ESBL UTI  · Patient with recurrent fever 10/7/22- Started empiric vancomycin whic was dced  · Discussed with infectious disease     observe off of antibiotic , cultures- sent on 10/9/22- negative        Hypercalcemia  Assessment & Plan  · Noted on labs 10/24  · Mild at 11 4>11 6 today PTH intact  · Nephrology following  · Hold all vitamin D and calcium supplements for now  · May consider IVF in does not improve  · Appreciate nephrology recs  · Lack of mobility could by playing a part    Ambulatory dysfunction  Assessment & Plan  · Will need rehab on discharge  · Case management for dispo planning       NOAH (acute kidney injury) ([facility] [address] )  Assessment & Plan  · Renal function appears to have stabilized around creatinine of 1 5-1 7  · Patient intermittently refusing labs, trend as able  · Possibly new baseline per Nephrology  · Creatinine up to  2 0 on labs 10/25, further management per Renal      Hypothyroidism  Assessment & Plan  · TSH elevated and fT4 low  · Endocrinology consulted - levothyroxine timing was adjusted to 2pm per Endocrinology recs (4 hours after tube feeds have stopped running)  · Repeat TFTs in 1 week per Endocrine - TSH checked and elevated at 27 improved from 67 T4 now normal; message sent to endocrine for further rec   · Update discussion with Endocrinology given improvement of TSH and normal T4 want to continue current timing of Synthroid dose with tube feedings recommend repeat TSH and T4 in 2 weeks from today    Diarrhea  Assessment & Plan  · Recent history of C diff colitis  · Treated with prophylactic dose of p o  Vancomycin while on antibiotics completed course     Hyponatremia  Assessment & Plan  · Nephrology following  ·  Hyponatremia the setting of poor solute intake with gastric bypass, continue with salt tablets 2 g t i d , minimize free water flushes with tube feeds  · Sodium appears stable   · Continue to monitor     Hypotension  Assessment & Plan  · Continue midodrine 10 mg t i d     Acute encephalopathy  Assessment & Plan  · Patient has waxing waning mental status   · Cognition and mental status issues multifactorial in nature stemming from prolonged illness, numerous hospitalizations, chronic malnutrition, and chronic/recurrent infections  · Monitor clinically  · Delirium precautions  · Neuropsych consulted for capacity eval-patient does not have capacity per neuropsych  · Given history of bipolar disorder, psychiatric consult requested to ensure this has been optimized - his saw patient on 10/19, no changes necessary at this time  · Paranoid about seeing a large group of people in her room who came in through the window, otherwise stable mental status currently mentation is stable continue to monitor       Goals of care, counseling/discussion  Assessment & Plan  · Evaluated by palliative care  Wants to continue medical directed treatments with limits of DNR/DNI  · Spoke with patient's brother Galo Saha on 10/11/22 who is power of  ( but financial power of ), We discussed goals of care  Brother, Galo Saha agrees that we need to address palliative/comfort option again given her failure to thrive  He will discuss with rest of the siblings and they all will discussed with patient  In next few days he will update us wether they  would like to have a meeting with palliative/hospice again      10/13/22 Debi Romero  for f/u- no response  Called LUCIA- reji Locke- wife responded- explained to her that I had spoken with Stormy Moura and to see if they had a family meeting  Also  patient did express that she feels comfort care is the route to go- reached out to Palliative team for follow up discussion on [address] and family meeting  10/14/22  Palliative team spoke with patient who today stated otherwise that she wants to do everything  Also Neuropsychiatry had evaluated patient and deemed no capacity, hence Palliative team has arranged a family meeting for Monday      10/17/22:  Per report, goals of care discussion was done and consensus is that patient will continue therapeutic treatment, will not transition to hospice at this point    Acute on chronic anemia  Assessment & Plan  · 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  · S/p recent EGD in 7/22 revealing: Residual marginal ulcer of the gastrojejunostomy anastomosis with improved size  · S/p EGD 9/2 revealing: Large, cratered ulcer in the gastrojejunal anastomosis   · Status post 1 unit PRBCs this admission  · Per previous provider d/w nephrology, requested to switch from PPI to pepcid given c/f eosinophilia, will need to hold off on this given EGD findings with ulcer for now      Bacteremia  Assessment & Plan  · Recent hx of staph epi bacteremia in 7/2022, presumed due to picc line  · Recurrence 1/2 set staph epi, 2nd set without growth    Was treated with intravenous vancomycin for presumed line infection  · S/p TAVIA (9/9) no evidence of vegetation  · Blood cultures had been negative however repeated again due to isolated fever which are negative x 5 days   · PICC and TPN discontinued, now has PEG tube  · Monitor off abx-monitoring for fevers, up trending leukocytosis  · Leukocytosis stable    Moderate protein-calorie malnutrition ([facility] [address] )  Assessment & Plan  Malnutrition Findings:   Adult Malnutrition type: Chronic illness  Adult Degree of Malnutrition: Other severe protein calorie malnutrition  Malnutrition Characteristics: Weight loss, Muscle loss       360 Statement: Severe/Chronic malnutrition r/t condition, as evidenced by 4 8% wt loss x < 1 week (9/2/22: 125#, 9/5/22: 119#), and severe muscle mass depletion (temples/clavicle)  Treated with liberalized diet and nutrition supplements, possibly nutrition support pending goals of care    BMI Findings: Body mass index is 18 71 kg/m²  · Continue tube feeds    H/O bariatric surgery - bypass  Assessment & Plan  · History of Savage-en-Y gastric bypass  Has had significant issues over the last 6 months or so with chronic malnutrition and an anastomotic ulcerations  When last seen by bariatric surgery, it was recommended to continue on prolonged TPN for nutritional optimization with possible revision of the Savage-en-Y in the future  · Given her recurrence PICC line infections and bacteremia, TPN has been discontinued  · Continue tube feeds for nutritional optimization  Patient has been tolerating tube feeds without any difficulty  Is also continued on pleasure feeds  · Will need significant improvement before consideration can be given to further surgical intervention      Abnormal CT scan  Assessment & Plan  · Noted on CT C/A/P 9/26  - Focal thickening of the lower portion of the endometrium  While this is not expected in a patient of this age, this appears stable from 2016 suggesting a benign etiology  If there is clinical concern, follow-up ultrasound is recommended  - Small left upper lobe nodules, one of which is new from March 2022  Given the history of smoking, a follow-up chest CT is recommended in 12 months  · This was d/w sister and brother POA over phone by prior provider 9/28          VTE Pharmacologic Prophylaxis: VTE Score: 3 Moderate Risk (Score 3-4) - Pharmacological DVT Prophylaxis Ordered: heparin      Patient Centered Rounds: I performed bedside rounds with nursing staff today   Discussions with Specialists or Other Care Team Provider:  Nephrology note reviewed message in to endocrinology    Education and Discussions with Family / Patient: No clinical updates to provide at this time  Time Spent for Care: 30 minutes  More than 50% of total time spent on counseling and coordination of care as described above  Current Length of Stay: 55 day(s)  Current Patient Status: Inpatient   Certification Statement: The patient will continue to require additional inpatient hospital stay due to Metabolic derangement further awaiting placement  Discharge Plan: This will Plan pending placement and improvement of calcium    Code Status: Level 3 - DNAR and DNI    Subjective:   Patient again stating her everything is a disaster reports she has had ongoing abdominal pain however when asked directly if she has current abdominal pain she denies  Patient does reports some tenderness around her PEG tube however nurse just did a thorough cleaning of the PEG tube region  Patient has no other generalized complaints at this time denies fevers chills chest pain or shortness of breath  Objective:     Vitals:   Temp (24hrs), Av 5 °F (36 9 °C), Min:97 6 °F (36 4 °C), Max:99 7 °F (37 6 °C)    Temp:  [97 6 °F (36 4 °C)-99 7 °F (37 6 °C)] 98 2 °F (36 8 °C)  HR:  [80-83] 80  Resp:  [14-20] 14  BP: (112-131)/(54-62) 125/62  SpO2:  [86 %-91 %] 91 %  Body mass index is 18 71 kg/m²  Input and Output Summary (last 24 hours):   No intake or output data in the 24 hours ending 10/25/22 1013    Physical Exam:   Physical Exam  Vitals and nursing note reviewed  Constitutional:       General: She is not in acute distress  Appearance: She is well-developed  HENT:      Head: Normocephalic and atraumatic  Eyes:      Conjunctiva/sclera: Conjunctivae normal    Cardiovascular:      Rate and Rhythm: Normal rate and regular rhythm  Heart sounds: No murmur heard    Pulmonary:      Effort: Pulmonary effort is normal  No respiratory distress  Breath sounds: Normal breath sounds  Abdominal:      Palpations: Abdomen is soft  Tenderness: There is no abdominal tenderness  Comments: PEG tube intact no noted drainage PEG tube site mild erythema no foul-smelling odor   Musculoskeletal:      Cervical back: Neck supple  Skin:     General: Skin is warm and dry  Neurological:      Mental Status: She is alert  Mental status is at baseline  Psychiatric:         Mood and Affect: Mood normal          Behavior: Behavior normal           Additional Data:     Labs:  Results from last 7 days   Lab Units 10/25/22  0613   WBC Thousand/uL 12 28*   HEMOGLOBIN g/dL 7 1*   HEMATOCRIT % 22 8*   PLATELETS Thousands/uL 501*   NEUTROS PCT % 59   LYMPHS PCT % 17   MONOS PCT % 7   EOS PCT % 15*     Results from last 7 days   Lab Units 10/25/22  0613   SODIUM mmol/L 133*   POTASSIUM mmol/L 4 3   CHLORIDE mmol/L 102   CO2 mmol/L 26   BUN mg/dL 45*   CREATININE mg/dL 2 00*   ANION GAP mmol/L 5   CALCIUM mg/dL 10 4*   ALBUMIN g/dL 2 4*   TOTAL BILIRUBIN mg/dL 0 32   ALK PHOS U/L 104   ALT U/L 17   AST U/L 23   GLUCOSE RANDOM mg/dL 129         Results from last 7 days   Lab Units 10/25/22  0612 10/24/22  1802 10/24/22  1155 10/24/22  0732 10/24/22  0627 10/24/22  0036 10/23/22  1744 10/23/22  1127 10/23/22  0757 10/23/22  0011 10/22/22  1749 10/22/22  1220   POC GLUCOSE mg/dl 122 90 99 101 127 122 92 105 115 118 103 85               Lines/Drains:  Invasive Devices  Report    Peripheral Intravenous Line  Duration           Peripheral IV 10/23/22 Dorsal (posterior); Left Hand 2 days          Drain  Duration           Gastrostomy/Enterostomy Percutaneous Endoscopic Gastrostomy (PEG) 20 Fr  LUQ 46 days    External Urinary Catheter 4 days                      Imaging: No pertinent imaging reviewed      Recent Cultures (last 7 days):         Last 24 Hours Medication List:   Current Facility-Administered Medications   Medication Dose Route Frequency Provider Last Rate   • acetaminophen  975 mg Oral Q6H PRN Wes Carvalho PA-C     • ALPRAZolam  0 25 mg Oral TID PRN Melissa Rajput DO     • bisacodyl  10 mg Rectal Daily PRN Justine Cardenas MD     • clonazePAM  3 mg Oral HS Jovita Mcdermott, DO     • folic acid  1 mg Oral Daily Jovita Mcdermott, DO     • guaiFENesin  600 mg Oral Q12H [address] Wes Carvalho PA-C     • heparin (porcine)  5,000 Units Subcutaneous [facility] Melissa Rajput, DO     • levothyroxine  125 mcg Oral Q24H Oluwatomisin El Bennett MD     • metoclopramide  10 mg Intravenous Q6H PRN Justine Cardenas MD     • midodrine  10 mg Oral TID AC TOLU Orellana     • ondansetron  4 mg Intravenous Q6H PRN Alcon Ray DO     • oxyCODONE  5 mg Oral Q6H Justine Cardenas MD     • pantoprazole  40 mg Oral Early Morning Mavis Jamison MD     • polyethylene glycol  17 g Per PEG Tube BID Alcon Ray DO     • senna  2 tablet Oral HS Lucinda Pérez MD     • sodium chloride  2 g Oral TID Sonny Lowery DO     • thiamine  100 mg Oral Daily Jovitakash Mcdermott, DO     • traZODone  150 mg Oral HS Alcon Ray DO          Today, Patient Was Seen By: Eliza Payan    **Please Note: This note may have been constructed using a voice recognition system  **

## 2022-10-25 NOTE — ASSESSMENT & PLAN NOTE
· Recent history of C diff colitis  · Treated with prophylactic dose of p o   Vancomycin while on antibiotics completed course

## 2022-10-25 NOTE — PROGRESS NOTES
Follow up Consultation    Nephrology   Cayetano Barakat 76 y o  female MRN: [id number]  Unit/Bed#: [facility] 323-01 Encounter: [id number]      Physician Requesting Consult: Veronika Landon MD        ASSESSMENT/PLAN:  64-year-old female with multiple comorbidities including bipolar disorder and CKD stage 3 presented with diarrhea and sepsis with prolonged hospital stay  Nephrology following for acute kidney injury  Acute kidney injury on CKD stage 3:  NOAH multifactorial most likely secondary to ischemic injury secondary to hemodynamic perturbations plus UTI/sepsis  After review of records In [facility] as well as Care everywhere it appears that the patient has a baseline Creatinine of 0 8-1 2 mg/dL  patient was admitted with a creatinine of 1 35 mg/dL on 08/31/2022  patient's creatinine today is at 2 00 mg/dL, minimally elevated from yesterday likely component prerenal azotemia  Will give normal saline at 100 cc an hour for 1 5 L then DC  More recently creatinine has leveled off around 1 7-1 8 mg/dL  Hold further diuretics and albumin for now  check CMP in a m  Await renal recovery  Optimize hemodynamic status to avoid delay in renal recovery  Place on a renal diet when allowed diet order  Avoid nephrotoxins, adjust meds to appropriate GFR  Strict I/O  Daily weights  Urinary retention protocol if patient does not have a Fraga  Most likely has underlying CKD secondary to age-related nephron loss  will need to set up patient for follow up with Nephrology as an outpatient post hospitalization  for nephrology as an outpatient patient follows up with no nephrologist  Patient found to have no capacity to make informed medical decisions  Blood pressure/chronic hypotension:  current medications:  Midodrine 10 mg p o  T i d   recommendations:  Adjusted hold parameters to hold for SBP greater than 110  Optimize hemodynamics  Maintain MAP > 65mmHg  Avoid BP fluctuations      H/H/anemia:  most recent hemoglobin at 7 1 grams/deciliter  maintain hemoglobin greater than 8 grams/deciliter  Recent GI bleed  Status post recent EGD in 2022  Currently has PEG tube for feeds  Transfusion per primary team    Acid-base electrolytes:    Electrolytes:    Stable  Hyponatremia:   Most recent sodium at 133 mEq  Continue salt tablets 2 g p o  T i d  Check BMP in a m  Maintains on free water flushes 125 cc q 4 as she has tube feeds  Hypokalemia:  Most recent potassium 4 3 mEq  Resolved    Hyper calcemia:  Corrected calcium 11 7 slightly elevated  Continue to monitor for now  Most recent intact PTH less than 6 3  Ionized calcium 1 34, minimally elevated  Vitamin-D 43  Hold all vitamin-D and calcium supplements for now  Will give normal saline at 100 cc an hour for 1 5 L then DC  Likely some contribution from decreased more mobility    Acid-base:    Most recent bicarb at 26 stable    Other medical problems:  Malnutrition:  Management per primary team   PEG tube in place  Follow-up with GI   UTI:  Management per primary team   Status post antibiotics      Thanks for the consult  Will continue to follow  Please call with questions/ concerns  Above-mentioned orders and Plan in terms of acute kidney injury was discussed with the team in depth via tiger text    Sana Rab Celestine Brunner, 10/25/2022, 7:55 AM              Objective :   Patient seen and examined in her room no overnight events hemodynamically stable remains afebrile urine output not adequately documented still complaints with discomfort need PEG tube        PHYSICAL EXAM  /62 (BP Location: Left arm)   Pulse 80   Temp 98 2 °F (36 8 °C) (Oral)   Resp 14   Ht 5' 4" (1 626 m)   Wt 49 4 kg (109 lb)   SpO2 91%   BMI 18 71 kg/m²   Temp (24hrs), Av 5 °F (36 9 °C), Min:97 6 °F (36 4 °C), Max:99 7 °F (37 6 °C)        Intake/Output Summary (Last 24 hours) at 10/25/2022 0755  Last data filed at 10/24/2022 0900  Gross per 24 hour   Intake 0 ml   Output --   Net 0 ml No intake/output data recorded  Current Weight: Weight - Scale:  (unable to stand, no bed scale)  First Weight: Weight - Scale: 61 2 kg (135 lb)  Physical Exam  Vitals and nursing note reviewed  Constitutional:       General: She is not in acute distress  Appearance: Normal appearance  She is normal weight  She is not ill-appearing, toxic-appearing or diaphoretic  HENT:      Head: Normocephalic and atraumatic  Mouth/Throat:      Mouth: Mucous membranes are dry  Pharynx: Oropharynx is clear  No oropharyngeal exudate  Eyes:      General: No scleral icterus  Conjunctiva/sclera: Conjunctivae normal    Cardiovascular:      Rate and Rhythm: Normal rate  Heart sounds: Normal heart sounds  No friction rub  Pulmonary:      Effort: Pulmonary effort is normal  No respiratory distress  Breath sounds: Normal breath sounds  No stridor  Abdominal:      General: There is no distension  Palpations: Abdomen is soft  Comments: PEG in place   Musculoskeletal:         General: No swelling  Cervical back: Normal range of motion and neck supple  No rigidity  Skin:     General: Skin is warm and dry  Coloration: Skin is not jaundiced  Neurological:      General: No focal deficit present  Mental Status: She is alert and oriented to person, place, and time  Psychiatric:         Mood and Affect: Mood normal          Behavior: Behavior normal              Review of Systems   Constitutional: Positive for fatigue  Negative for chills  HENT: Negative for congestion  Respiratory: Negative for cough, shortness of breath and wheezing  Cardiovascular: Negative for leg swelling  Gastrointestinal: Positive for abdominal pain  Negative for diarrhea and vomiting  Genitourinary: Negative for dysuria  Musculoskeletal: Negative for back pain  Neurological: Negative for headaches  Psychiatric/Behavioral: Negative for agitation and confusion     All other systems reviewed and are negative  Scheduled Meds:  Current Facility-Administered Medications   Medication Dose Route Frequency Provider Last Rate   • acetaminophen  975 mg Oral Q6H PRN Checo Brito PA-C     • ALPRAZolam  0 25 mg Oral TID PRN Lara Nichols DO     • bisacodyl  10 mg Rectal Daily PRN Merlin Luna, MD     • clonazePAM  3 mg Oral HS Jovita Mcdermott, DO     • folic acid  1 mg Oral Daily Jovita Mcdermott, DO     • guaiFENesin  600 mg Oral Q12H [facility] Checo Brito PA-C     • heparin (porcine)  5,000 Units Subcutaneous [facility] Laratreasure Nichols, DO     • levothyroxine  125 mcg Oral Q24H [place] Hemanth Mitchell MD     • metoclopramide  10 mg Intravenous Q6H PRN Merlin Luna, MD     • midodrine  10 mg Oral TID AC TOLU Orellana     • ondansetron  4 mg Intravenous Q6H PRN Ayse Davis DO     • oxyCODONE  5 mg Oral Q6H Merlin Luna, MD     • pantoprazole  40 mg Oral Early Morning Aristeo Hinojosa MD     • polyethylene glycol  17 g Per PEG Tube BID Ayse Susan, DO     • senna  2 tablet Oral HS Rommel Shanks MD     • sodium chloride  2 g Oral TID Paty Boggs DO     • thiamine  100 mg Oral Daily Jovita Mcdermott, DO     • traZODone  150 mg Oral HS Jovita Mcdermott, DO         PRN Meds: •  acetaminophen  •  ALPRAZolam  •  bisacodyl  •  metoclopramide  •  ondansetron    Continuous Infusions:       Invasive Devices: Invasive Devices  Report    Peripheral Intravenous Line  Duration           Peripheral IV 10/23/22 Dorsal (posterior); Left Hand 2 days          Drain  Duration           Gastrostomy/Enterostomy Percutaneous Endoscopic Gastrostomy (PEG) 20 Fr  LUQ 46 days    External Urinary Catheter 4 days                  LABORATORY:    Results from last 7 days   Lab Units 10/25/22  0613 10/24/22  0607 10/23/22  0233 10/19/22  0656   WBC Thousand/uL 12 28*  --   --  9 84   HEMOGLOBIN g/dL 7 1*  --   --  7 3*   HEMATOCRIT % 22 8*  --   --  23 3*   PLATELETS Thousands/uL 501*  --   -- 445*   POTASSIUM mmol/L 4 3 3 4* 3 8 4 2   CHLORIDE mmol/L 102 102 103 106   CO2 mmol/L 26 25 28 23   BUN mg/dL 45* 44* 43* 36*   CREATININE mg/dL 2 00* 1 90* 1 90* 1 84*   CALCIUM mg/dL 10 4* 10 2* 10 4* 10 0   PHOSPHORUS mg/dL  --  3 9  --   --       rest all reviewed    RADIOLOGY:  CT head wo contrast   Final Result by Jaquelin Bright MD (10/11 1651)      No acute intracranial abnormality  Workstation performed: [id number]         CT spine cervical wo contrast   Final Result by Jaquelin Bright MD (10/11 1654)      No acute cervical spine fracture or traumatic malalignment  Workstation performed: [id number]         CT chest abdomen pelvis w contrast   Final Result by Krista Murry MD (09/27 1215)      1  Patchy and groundglass opacities in the upper lobes  The appearance is most typical for pulmonary edema though in the setting of systemic inflammatory response syndrome, atypical infection is not excluded  2   Small bilateral pleural effusions and trace ascites  3   Focal thickening of the lower portion of the endometrium  While this is not expected in a patient of this age, this appears stable from 2016 suggesting a benign etiology  If there is clinical concern, follow-up ultrasound is recommended  4   Cholelithiasis  5   Small left upper lobe nodules, one of which is new from March 2022  Given the history of smoking, a follow-up chest CT is recommended in 12 months  The study was marked in EPIC for significant notification  Workstation performed: [id number]         VAS upper limb venous duplex scan, complete, bilateral   Final Result by Ale Tesfaye MD (09/25 2039)      VAS lower limb venous duplex study, complete bilateral   Final Result by Ale Tesfaye MD (09/25 2039)      CT abdomen pelvis wo contrast   Final Result by Sita Santos MD (09/23 1535)      1    Cholelithiasis with mild gallbladder luminal distention  Note is made of patient's elevated alkaline phosphatase  Ultrasound was recently performed (September 21, 2022), and correlation for clinical signs of acute cholecystitis is recommended  2   Large colonic stool burden without bowel obstruction  3   Trace bilateral pleural effusions  Workstation performed: [id number]         XR chest portable   Final Result by Siddharth Massey MD (09/23 1437)      Increased interstitial markings, improved from 9/6/2022, question interstitial edema or infectious/inflammatory  Workstation performed: [id number]         US right upper quadrant with liver dopplers   Final Result by Mary Ware MD (09/22 2965)      Cholelithiasis without findings of acute cholecystitis  Workstation performed: [id number]         XR abdomen 1 view kub   Final Result by Haley Roberto MD (09/15 0802)      No acute findings  Significant colon stool, correlate clinically for constipation  Workstation performed: [id number]         XR chest portable   Final Result by Julio Tillman DO (09/07 1103)      Right-sided PICC tip projects at the right subclavian vein  Mild bilateral peribronchial thickening, possibly related to chronic bronchitis  No acute infiltrates  Minimal linear atelectasis or scarring in the mid lungs bilaterally  Workstation performed: [id number]         CT abdomen pelvis with contrast   Final Result by Sonam Mejia MD (23/65 8125)      1  Diffuse wall thickening of the colon with surrounding fat stranding suspicious for colitis i e  infectious or inflammatory  2   Mild circumferential wall thickening of the urinary bladder  Correlate with urinalysis for possible cystitis  3   Cholelithiasis  The study was marked in [facility] for immediate notification        Workstation performed: [id number]           Rest all reviewed    Portions of the record may have been created with voice recognition software  Occasional wrong word or "sound a like" substitutions may have occurred due to the inherent limitations of voice recognition software  Read the chart carefully and recognize, using context, where substitutions have occurred  If you have any questions, please contact the dictating provider

## 2022-10-25 NOTE — ASSESSMENT & PLAN NOTE
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

## 2022-10-25 NOTE — ASSESSMENT & PLAN NOTE
· Patient has waxing waning mental status   · Cognition and mental status issues multifactorial in nature stemming from prolonged illness, numerous hospitalizations, chronic malnutrition, and chronic/recurrent infections    · Monitor clinically  · Delirium precautions  · Neuropsych consulted for capacity eval-patient does not have capacity per neuropsych  · Given history of bipolar disorder, psychiatric consult requested to ensure this has been optimized - his saw patient on 10/19, no changes necessary at this time  · Paranoid about seeing a large group of people in her room who came in through the window, otherwise stable mental status 10/23, stable 10/24

## 2022-10-25 NOTE — ASSESSMENT & PLAN NOTE
· Noted on labs 10/24  · Mild at 11 4, PTH low  · Will recheck tomorrow and check ionized calcium as well  · Can consider IV fluids if not improving, noted by Renal as well

## 2022-10-25 NOTE — PLAN OF CARE
Problem: Potential for Falls  Goal: Patient will remain free of falls  Description: INTERVENTIONS:  - Educate patient/family on patient safety including physical limitations  - Instruct patient to call for assistance with activity   - Consult OT/PT to assist with strengthening/mobility   - Keep Call bell within reach  - Keep bed low and locked with side rails adjusted as appropriate  - Keep care items and personal belongings within reach  - Initiate and maintain comfort rounds  - Make Fall Risk Sign visible to staff  - Offer Toileting every  Hours, in advance of need  - Initiate/Maintain alarm  - Obtain necessary fall risk management equipment:   - Apply yellow socks and bracelet for high fall risk patients  - Consider moving patient to room near nurses station  Outcome: Progressing     Problem: MOBILITY - ADULT  Goal: Maintain or return to baseline ADL function  Description: INTERVENTIONS:  -  Assess patient's ability to carry out ADLs; assess patient's baseline for ADL function and identify physical deficits which impact ability to perform ADLs (bathing, care of mouth/teeth, toileting, grooming, dressing, etc )  - Assess/evaluate cause of self-care deficits   - Assess range of motion  - Assess patient's mobility; develop plan if impaired  - Assess patient's need for assistive devices and provide as appropriate  - Encourage maximum independence but intervene and supervise when necessary  - Involve family in performance of ADLs  - Assess for home care needs following discharge   - Consider OT consult to assist with ADL evaluation and planning for discharge  - Provide patient education as appropriate  Outcome: Progressing  Goal: Maintains/Returns to pre admission functional level  Description: INTERVENTIONS:  - Perform BMAT or MOVE assessment daily    - Set and communicate daily mobility goal to care team and patient/family/caregiver     - Collaborate with rehabilitation services on mobility goals if consulted  - Perform Range of Motion  times a day  - Reposition patient every  hours    - Dangle patient  times a day  - Stand patient  times a day  - Ambulate patient  times a day  - Out of bed to chair  times a day   - Out of bed for meals  times a day  - Out of bed for toileting  - Record patient progress and toleration of activity level   Outcome: Progressing     Problem: INFECTION - ADULT  Goal: Absence or prevention of progression during hospitalization  Description: INTERVENTIONS:  - Assess and monitor for signs and symptoms of infection  - Monitor lab/diagnostic results  - Monitor all insertion sites, i e  indwelling lines, tubes, and drains  - Monitor endotracheal if appropriate and nasal secretions for changes in amount and color  - [place] appropriate cooling/warming therapies per order  - Administer medications as ordered  - Instruct and encourage patient and family to use good hand hygiene technique  - Identify and instruct in appropriate isolation precautions for identified infection/condition  Outcome: Progressing  Goal: Absence of fever/infection during neutropenic period  Description: INTERVENTIONS:  - Monitor WBC    Outcome: Progressing     Problem: SAFETY ADULT  Goal: Patient will remain free of falls  Description: INTERVENTIONS:  - Educate patient/family on patient safety including physical limitations  - Instruct patient to call for assistance with activity   - Consult OT/PT to assist with strengthening/mobility   - Keep Call bell within reach  - Keep bed low and locked with side rails adjusted as appropriate  - Keep care items and personal belongings within reach  - Initiate and maintain comfort rounds  - Make Fall Risk Sign visible to staff  - Offer Toileting every  Hours, in advance of need  - Initiate/Maintain alarm  - Obtain necessary fall risk management equipment:   - Apply yellow socks and bracelet for high fall risk patients  - Consider moving patient to room near nurses station  Outcome: Progressing  Goal: Maintain or return to baseline ADL function  Description: INTERVENTIONS:  -  Assess patient's ability to carry out ADLs; assess patient's baseline for ADL function and identify physical deficits which impact ability to perform ADLs (bathing, care of mouth/teeth, toileting, grooming, dressing, etc )  - Assess/evaluate cause of self-care deficits   - Assess range of motion  - Assess patient's mobility; develop plan if impaired  - Assess patient's need for assistive devices and provide as appropriate  - Encourage maximum independence but intervene and supervise when necessary  - Involve family in performance of ADLs  - Assess for home care needs following discharge   - Consider OT consult to assist with ADL evaluation and planning for discharge  - Provide patient education as appropriate  Outcome: Progressing  Goal: Maintains/Returns to pre admission functional level  Description: INTERVENTIONS:  - Perform BMAT or MOVE assessment daily    - Set and communicate daily mobility goal to care team and patient/family/caregiver  - Collaborate with rehabilitation services on mobility goals if consulted  - Perform Range of Motion times a day  - Reposition patient every  hours    - Dangle patient  times a day  - Stand patient  times a day  - Ambulate patient  times a day  - Out of bed to chair  times a day   - Out of bed for meals  times a day  - Out of bed for toileting  - Record patient progress and toleration of activity level   Outcome: Progressing     Problem: DISCHARGE PLANNING  Goal: Discharge to home or other facility with appropriate resources  Description: INTERVENTIONS:  - Identify barriers to discharge w/patient and caregiver  - Arrange for needed discharge resources and transportation as appropriate  - Identify discharge learning needs (meds, wound care, etc )  - Arrange for interpretive services to assist at discharge as needed  - Refer to Case Management Department for coordinating discharge planning if the patient needs post-hospital services based on physician/advanced practitioner order or complex needs related to functional status, cognitive ability, or social support system  Outcome: Progressing     Problem: Knowledge Deficit  Goal: Patient/family/caregiver demonstrates understanding of disease process, treatment plan, medications, and discharge instructions  Description: Complete learning assessment and assess knowledge base    Interventions:  - Provide teaching at level of understanding  - Provide teaching via preferred learning methods  Outcome: Progressing     Problem: SKIN/TISSUE INTEGRITY - ADULT  Goal: Skin Integrity remains intact(Skin Breakdown Prevention)  Description: Assess:  -Perform Gabe assessment every   -Clean and moisturize skin every   -Inspect skin when repositioning, toileting, and assisting with ADLS  -Assess under medical devices such as  every   -Assess extremities for adequate circulation and sensation     Bed Management:  -Have minimal linens on bed & keep smooth, unwrinkled  -Change linens as needed when moist or perspiring  -Avoid sitting or lying in one position for more than  hours while in bed  -Keep HOB at degrees     Toileting:  -Offer bedside commode  -Assess for incontinence every   -Use incontinent care products after each incontinent episode such as     Activity:  -Mobilize patient times a day  -Encourage activity and walks on unit  -Encourage or provide ROM exercises   -Turn and reposition patient every  Hours  -Use appropriate equipment to lift or move patient in bed  -Instruct/ Assist with weight shifting every  when out of bed in chair  -Consider limitation of chair time  hour intervals    Skin Care:  -Avoid use of baby powder, tape, friction and shearing, hot water or constrictive clothing  -Relieve pressure over bony prominences using   -Do not massage red bony areas    Next Steps:  -Teach patient strategies to minimize risks such as    -Consider consults to  interdisciplinary teams such as  Outcome: Progressing  Goal: Incision(s), wounds(s) or drain site(s) healing without S/S of infection  Description: INTERVENTIONS  - Assess and document dressing, incision, wound bed, drain sites and surrounding tissue  - Provide patient and family education  - Perform skin care/dressing changes every   Outcome: Progressing  Goal: Pressure injury heals and does not worsen  Description: Interventions:  - Implement low air loss mattress or specialty surface (Criteria met)  - Apply silicone foam dressing  - Instruct/assist with weight shifting every  minutes when in chair   - Limit chair time to  hour intervals  - Use special pressure reducing interventions such as  when in chair   - Apply fecal or urinary incontinence containment device   - Perform passive or active ROM every   - Turn and reposition patient & offload bony prominences every  hours   - Utilize friction reducing device or surface for transfers   - Consider consults to  interdisciplinary teams such as   - Use incontinent care products after each incontinent episode   - Consider nutrition services referral as needed  Outcome: Progressing     Problem: Prexisting or High Potential for Compromised Skin Integrity  Goal: Skin integrity is maintained or improved  Description: INTERVENTIONS:  - Identify patients at risk for skin breakdown  - Assess and monitor skin integrity  - Assess and monitor nutrition and hydration status  - Monitor labs   - Assess for incontinence   - Turn and reposition patient  - Assist with mobility/ambulation  - Relieve pressure over bony prominences  - Avoid friction and shearing  - Provide appropriate hygiene as needed including keeping skin clean and dry  - Evaluate need for skin moisturizer/barrier cream  - Collaborate with interdisciplinary team   - Patient/family teaching  - Consider wound care consult   Outcome: Progressing     Problem: Nutrition/Hydration-ADULT  Goal: Nutrient/Hydration intake appropriate for improving, restoring or maintaining nutritional needs  Description: Monitor and assess patient's nutrition/hydration status for malnutrition  Collaborate with interdisciplinary team and initiate plan and interventions as ordered  Monitor patient's weight and dietary intake as ordered or per policy  Utilize nutrition screening tool and intervene as necessary  Determine patient's food preferences and provide high-protein, high-caloric foods as appropriate       INTERVENTIONS:  - Monitor oral intake, urinary output, labs, and treatment plans  - Assess nutrition and hydration status and recommend course of action  - Evaluate amount of meals eaten  - Assist patient with eating if necessary   - Allow adequate time for meals  - Recommend/ encourage appropriate diets, oral nutritional supplements, and vitamin/mineral supplements  - Order, calculate, and assess calorie counts as needed  - Recommend, monitor, and adjust tube feedings and TPN/PPN based on assessed needs  - Assess need for intravenous fluids  - Provide specific nutrition/hydration education as appropriate  - Include patient/family/caregiver in decisions related to nutrition  Outcome: Progressing

## 2022-10-25 NOTE — ASSESSMENT & PLAN NOTE
· Patient has waxing waning mental status   · Cognition and mental status issues multifactorial in nature stemming from prolonged illness, numerous hospitalizations, chronic malnutrition, and chronic/recurrent infections    · Monitor clinically  · Delirium precautions  · Neuropsych consulted for capacity eval-patient does not have capacity per neuropsych  · Given history of bipolar disorder, psychiatric consult requested to ensure this has been optimized - his saw patient on 10/19, no changes necessary at this time  · Paranoid about seeing a large group of people in her room who came in through the window, otherwise stable mental status currently mentation is stable continue to monitor

## 2022-10-25 NOTE — OCCUPATIONAL THERAPY NOTE
Occupational Therapy Treatment Note      Douds Grooms    10/25/2022    Principal Problem:    Sepsis ([facility] [address] )  Active Problems:    Hypothyroidism    Hyponatremia    Abnormal CT scan    H/O bariatric surgery - bypass    NOAH (acute kidney injury) ([facility] [address] )    Ambulatory dysfunction    Diarrhea    Fall    Moderate protein-calorie malnutrition (HCC)    Bacteremia    Acute on chronic anemia    Goals of care, counseling/discussion    Acute encephalopathy    Hypotension    Urinary retention    Hyperkalemia    Hypercalcemia      Past Medical History:   Diagnosis Date    Anemia     Anxiety     Bipolar disorder (HCC)     Colon polyp     Disease of thyroid gland     Essential hypertension     Essential tremor     Fibromyalgia, primary     GERD (gastroesophageal reflux disease)     Inflammatory polyarthropathy (HCC)     Mammogram abnormal     Pressure injury of skin        Past Surgical History:   Procedure Laterality Date    BREAST BIOPSY Right 2015    benign    CARPAL TUNNEL RELEASE Bilateral     COLONOSCOPY      EGD AND COLONOSCOPY  01/01/2014    GASTRIC BYPASS  07/01/2012    MAMMO NEEDLE LOCALIZATION RIGHT (ALL INC) Right 2/6/2012    MAMMO NEEDLE LOCALIZATION RIGHT (ALL INC) Right 2/6/2012    ULNAR NERVE TRANSPOSITION Right         10/25/22 1208   OT Last Visit   OT Visit Date 10/25/22   Note Type   Note Type Treatment   Pain Assessment   Pain Assessment Tool FLACC   Pain Rating: FLACC (Rest) - Face 1   Pain Rating: FLACC (Rest) - Legs 0   Pain Rating: FLACC (Rest) - Activity 0   Pain Rating: FLACC (Rest) - Cry 1   Pain Rating: FLACC (Rest) - Consolability 0   Score: FLACC (Rest) 2   Pain Rating: FLACC (Activity) - Face 1   Pain Rating: FLACC (Activity) - Legs 0   Pain Rating: FLACC (Activity) - Activity 0   Pain Rating: FLACC (Activity) - Cry 1   Pain Rating: FLACC (Activity) - Consolability 0   Score: FLACC (Activity) 2   Restrictions/Precautions   Other Precautions Contact/isolation;Cognitive; Chair Alarm; Fall Risk Lifestyle   Autonomy I adls and mobility -i iadls   Reciprocal Relationships supportive family -   Service to Others retired   Intrinsic Gratification gardening   ADL   Grooming Assistance 5  Supervision/Setup   Grooming Deficit Setup; Increased time to complete;Wash/dry hands; Wash/dry face;Brushing hair   Grooming Comments while seated at edge of chair, pt agreeable to wash face and comb her hair  she was irritable and refused further self care  Bed Mobility   Rolling R 5  Supervision   Rolling L 5  Supervision   Sit to Supine 5  Supervision   Additional Comments pt able to get self down to supine from sitting at EOB  able to roll side to side w use of side rails, increased time  min assist to slid up higher in bed  Transfers   Sit to Stand 4  Minimal assistance   Additional items Assist x 1; Increased time required;Verbal cues; Impulsive   Stand to Sit 4  Minimal assistance   Additional items Assist x 1; Increased time required;Verbal cues   Stand pivot 3  Moderate assistance   Additional items Assist x 1; Increased time required;Verbal cues   Cognition   Overall Cognitive Status Impaired   Attention Attends with cues to redirect   Orientation Level Oriented to person   Activity Tolerance   Activity Tolerance Patient limited by fatigue   Medical Staff Made Aware ok to see per CHITO [facility]ess[facility]   Assessment   Assessment pt seen for short OT session  pt seated up in recliner chair upon arrival,trying to climb up from chair, wanting to get back to bed  pt able to scoot back in chair, for good upright posture in order to lower leg portion of recliner chair  while sitting at [facility] of chair, pt able to wash own face/hands and comb hair  she refused further self care, stating she wants to get back to bed  pt required min assisst sit to stand and mod assist for stand pivot to bed  once sitting at EOB, she was able to get self into supine position  pt made comfortable, all needs attended to    OT will continue to follow as per POC Plan   Treatment Interventions ADL retraining;Functional transfer training;Energy conservation; Activityengagement; Compensatory technique education;Patient/family training   Goal Expiration Date 11/16/22   OT Treatment Day 9   OT Frequency 1-2x/wk   Recommendation   OT Discharge Recommendation Post acute rehabilitation services   AM-PAC Daily Activity Inpatient   Lower Body Dressing 2   Bathing 2   Toileting 2   Upper Body Dressing 3   Grooming 3   Eating 3   Daily Activity Raw Score 15   Daily Activity Standardized Score (Calc for Raw Score >=11) 34 37

## 2022-10-25 NOTE — PROGRESS NOTES
[facility]  Progress Note - Jose Monzon 1945, 76 y o  female MRN: [id number]  Unit/Bed#: [facility] 323-01 Encounter: [id number]  Primary Care Provider: Lyudmila Carvalho MD   Date and time admitted to hospital: 8/30/2022 11:49 PM    Hypercalcemia  Assessment & Plan  · Noted on labs 10/24  · Mild at 11 4, PTH low  · Will recheck tomorrow and check ionized calcium as well  · Can consider IV fluids if not improving, noted by Renal as well    Hypotension  Assessment & Plan  · Continue midodrine 10 mg t i d     Acute encephalopathy  Assessment & Plan  · Patient has waxing waning mental status   · Cognition and mental status issues multifactorial in nature stemming from prolonged illness, numerous hospitalizations, chronic malnutrition, and chronic/recurrent infections  · Monitor clinically  · Delirium precautions  · Neuropsych consulted for capacity eval-patient does not have capacity per neuropsych  · Given history of bipolar disorder, psychiatric consult requested to ensure this has been optimized - his saw patient on 10/19, no changes necessary at this time  · Paranoid about seeing a large group of people in her room who came in through the window, otherwise stable mental status 10/23, stable 10/24      Goals of care, counseling/discussion  Assessment & Plan  · Evaluated by palliative care  Wants to continue medical directed treatments with limits of DNR/DNI  · Spoke with patient's brother Rekha Christianson on 10/11/22 who is power of  ( but financial power of ), We discussed goals of care  Brother, Rekha Christianson agrees that we need to address palliative/comfort option again given her failure to thrive  He will discuss with rest of the siblings and they all will discussed with patient  In next few days he will update us wether they  would like to have a meeting with palliative/hospice again      10/13/22 Called [facility]  for f/u- no response  Called LUCIA- reji Locke- wife responded- explained to her that I had spoken with Rekha Sammy and to see if they had a family meeting  Also  patient did express that she feels comfort care is the route to go- reached out to Palliative team for follow up discussion on [address] and family meeting  10/14/22  Palliative team spoke with patient who today stated otherwise that she wants to do everything  Also Neuropsychiatry had evaluated patient and deemed no capacity, hence Palliative team has arranged a family meeting for Monday      10/17/22:  Per report, goals of care discussion was done and consensus is that patient will continue therapeutic treatment, will not transition to hospice at this point    Acute on chronic anemia  Assessment & Plan  · 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  · S/p recent EGD in 7/22 revealing: Residual marginal ulcer of the gastrojejunostomy anastomosis with improved size  · S/p EGD 9/2 revealing: Large, cratered ulcer in the gastrojejunal anastomosis   · Status post 1 unit PRBCs this admission  · Per d/w nephrology, requsted to switch from PPI to pepcid given c/f eosinophilia, will need to hold off on this given EGD findings with ulcer for now      Bacteremia  Assessment & Plan  · Recent hx of staph epi bacteremia in 7/2022, presumed due to picc line  · Recurrence 1/2 set staph epi, 2nd set without growth  Was treated with intravenous vancomycin for presumed line infection  · S/p TAVIA (9/9) no evidence of vegetation  · Blood cultures had been negative however repeated again due to isolated fever which are negative to date    · PICC and TPN discontinued, now has PEG tube  · Monitor off abx-monitoring for fevers, up trending leukocytosis  · Leukocytosis stable    Moderate protein-calorie malnutrition (HCC)  Assessment & Plan  Malnutrition Findings:   Adult Malnutrition type: Chronic illness  Adult Degree of Malnutrition: Other severe protein calorie malnutrition  Malnutrition Characteristics: Weight loss, Muscle loss       360 Statement: Severe/Chronic malnutrition r/t condition, as evidenced by 4 8% wt loss x < 1 week (9/2/22: 125#, 9/5/22: 119#), and severe muscle mass depletion (temples/clavicle)  Treated with liberalized diet and nutrition supplements, possibly nutrition support pending goals of care    BMI Findings: Body mass index is 18 71 kg/m²  · Continue tube feeds    Ambulatory dysfunction  Assessment & Plan  · Will need rehab on discharge      NOAH (acute kidney injury) ([facility] [address] )  Assessment & Plan  · Renal function appears to have stabilized around creatinine of 1 5-1 7  · Patient intermittently refusing labs, trend as able  · Possibly new baseline per Nephrology  · Creatinine up to 1 9 on labs 10/24, further management per Renal      Hypothyroidism  Assessment & Plan  · TSH elevated and fT4 low  · Endocrinology consulted - levothyroxine timing was adjusted to 2pm per Endocrinology recs (4 hours after tube feeds have stopped running)  · Repeat TFTs in 1 week per Endocrine - TSH checked and elevated at 27, will need to discuss with Endocrine tomorrow how to manage this further        VTE Pharmacologic Prophylaxis: VTE Score: 3 Moderate Risk (Score 3-4) - Pharmacological DVT Prophylaxis Ordered: heparin  Patient Centered Rounds: Discussed with RN  Discussions with Specialists or Other Care Team Provider:  Nephrology    Education and Discussions with Family / Patient: No clinical update  Time Spent for Care: 20 minutes  More than 50% of total time spent on counseling and coordination of care as described above  Current Length of Stay: 54 day(s)  Current Patient Status: Inpatient   Certification Statement: The patient will continue to require additional inpatient hospital stay due to Awaiting rehab placement  Discharge Plan: Anticipate discharge in 48-72 hrs to rehab facility   patient is ready for discharge, however has not been offered rehab facilities to do not anticipate discharge tomorrow because of this not because of any medical reason  Code Status: Level 3 - DNAR and DNI    Subjective:   Denies any acute concerns  Notes her G-tube is clogged, denies abdominal pain currently although states “the whole thing is a disaster”  no lightheadedness, chest pain, nausea/vomiting, dyspnea    Objective:     Vitals:   Temp (24hrs), Av 3 °F (36 8 °C), Min:97 6 °F (36 4 °C), Max:98 7 °F (37 1 °C)    Temp:  [97 6 °F (36 4 °C)-98 7 °F (37 1 °C)] 97 6 °F (36 4 °C)  HR:  [75-94] 80  Resp:  [15-16] 15  BP: (112-117)/(52-54) 112/54  SpO2:  [86 %-91 %] 86 %  Body mass index is 18 71 kg/m²  Input and Output Summary (last 24 hours): Intake/Output Summary (Last 24 hours) at 10/24/2022 2007  Last data filed at 10/24/2022 0900  Gross per 24 hour   Intake 1036 ml   Output 200 ml   Net 836 ml       Physical Exam:   Physical Exam  Vitals reviewed  Constitutional:       General: She is not in acute distress  Appearance: She is not toxic-appearing  Comments: Cachectic, chronically ill-appearing, old scarring noted on face and neck   HENT:      Mouth/Throat:      Mouth: Mucous membranes are moist    Eyes:      General: No scleral icterus  Cardiovascular:      Rate and Rhythm: Normal rate and regular rhythm  Pulmonary:      Effort: Pulmonary effort is normal  No respiratory distress  Breath sounds: Normal breath sounds  Abdominal:      General: Bowel sounds are normal  There is distension  Palpations: Abdomen is soft  Tenderness: There is no abdominal tenderness  Comments: G-tube in place appears normal   Musculoskeletal:      Right lower leg: No edema  Left lower leg: No edema  Skin:     General: Skin is warm and dry  Neurological:      General: No focal deficit present  Mental Status: She is alert  Mental status is at baseline  She is disoriented     Psychiatric:         Mood and Affect: Mood normal          Behavior: Behavior normal             Additional Data: Labs:  Results from last 7 days   Lab Units 10/19/22  0656   WBC Thousand/uL 9 84   HEMOGLOBIN g/dL 7 3*   HEMATOCRIT % 23 3*   PLATELETS Thousands/uL 445*     Results from last 7 days   Lab Units 10/24/22  0607   SODIUM mmol/L 134*   POTASSIUM mmol/L 3 4*   CHLORIDE mmol/L 102   CO2 mmol/L 25   BUN mg/dL 44*   CREATININE mg/dL 1 90*   ANION GAP mmol/L 7   CALCIUM mg/dL 10 2*   ALBUMIN g/dL 2 3*   TOTAL BILIRUBIN mg/dL 0 23   ALK PHOS U/L 87   ALT U/L 14   AST U/L 17   GLUCOSE RANDOM mg/dL 108         Results from last 7 days   Lab Units 10/24/22  1802 10/24/22  1155 10/24/22  0732 10/24/22  0627 10/24/22  0036 10/23/22  1744 10/23/22  1127 10/23/22  0757 10/23/22  0011 10/22/22  1749 10/22/22  1220 10/22/22  0614   POC GLUCOSE mg/dl 90 99 101 127 122 92 105 115 118 103 85 117               Lines/Drains:  Invasive Devices  Report    Peripheral Intravenous Line  Duration           Peripheral IV 10/23/22 Dorsal (posterior); Left Hand 1 day          Drain  Duration           Gastrostomy/Enterostomy Percutaneous Endoscopic Gastrostomy (PEG) 20 Fr  LUQ 46 days    External Urinary Catheter 4 days                      Imaging: No pertinent imaging reviewed      Recent Cultures (last 7 days):         Last 24 Hours Medication List:   Current Facility-Administered Medications   Medication Dose Route Frequency Provider Last Rate   • acetaminophen  975 mg Oral Q6H PRN Nina Fernandez PA-C     • ALPRAZolam  0 25 mg Oral TID PRN Ferna Push, DO     • bisacodyl  10 mg Rectal Daily PRN Kriss Galindo MD     • clonazePAM  3 mg Oral HS Jovita Mcdermott, DO     • folic acid  1 mg Oral Daily Jovita Mcdermott, DO     • guaiFENesin  600 mg Oral Q12H [address] Nina Fernandez PA-C     • heparin (porcine)  5,000 Units Subcutaneous [facility] Ferna Push, DO     • levothyroxine  125 mcg Oral Q24H [place] Bonita Bateman MD     • metoclopramide  10 mg Intravenous Q6H PRN Kriss Galindo MD     • midodrine  10 mg Oral TID AC Jud TOLU Leal     • ondansetron  4 mg Intravenous Q6H PRN Muna Dominguez, DO     • oxyCODONE  5 mg Oral Q6H Steve Cazares MD     • pantoprazole  40 mg Oral Early Morning Yuki Schaefer MD     • polyethylene glycol  17 g Per PEG Tube BID Muna Dominguez, DO     • senna  2 tablet Oral HS Emily Preston MD     • sodium chloride  2 g Oral TID Flor Mcdaniel DO     • thiamine  100 mg Oral Daily Jovita Mcdermott DO     • traZODone  150 mg Oral HS Muna Dominguez, DO          Today, Patient Was Seen By: Yuki Schaefer    **Please Note: This note may have been constructed using a voice recognition system  **

## 2022-10-25 NOTE — ASSESSMENT & PLAN NOTE
· Renal function appears to have stabilized around creatinine of 1 5-1 7  · Patient intermittently refusing labs, trend as able  · Possibly new baseline per Nephrology  · Creatinine up to 1 9 on labs 10/24, further management per Renal

## 2022-10-25 NOTE — PLAN OF CARE
Problem: OCCUPATIONAL THERAPY ADULT  Goal: Performs self-care activities at highest level of function for planned discharge setting  See evaluation for individualized goals  Description: Treatment Interventions: ADL retraining, Functional transfer training, Endurance training, UE strengthening/ROM, Cognitive reorientation, Patient/family training, Equipment evaluation/education, Compensatory technique education, Continued evaluation, Energy conservation, Activityengagement          See flowsheet documentation for full assessment, interventions and recommendations  Note: Limitation: Decreased ADL status, Decreased endurance, Decreased self-care trans, Decreased high-level ADLs  Prognosis: Fair  Assessment: pt seen for short OT session  pt seated up in recliner chair upon arrival,trying to climb up from chair, wanting to get back to bed  pt able to scoot back in chair, for good upright posture in order to lower leg portion of recliner chair  while sitting at [facility] of chair, pt able to wash own face/hands and comb hair  she refused further self care, stating she wants to get back to bed  pt required min assisst sit to stand and mod assist for stand pivot to bed  once sitting at EOB, she was able to get self into supine position  pt made comfortable, all needs attended to    OT will continue to follow as per POC     OT Discharge Recommendation: Post acute rehabilitation services

## 2022-10-25 NOTE — ASSESSMENT & PLAN NOTE
· Evaluated by palliative care  Wants to continue medical directed treatments with limits of DNR/DNI  · Spoke with patient's brother Shaylee Hernandez on 10/11/22 who is power of  ( but financial power of ), We discussed goals of care  BrotherShaylee agrees that we need to address palliative/comfort option again given her failure to thrive  He will discuss with rest of the siblings and they all will discussed with patient  In next few days he will update us wether they  would like to have a meeting with palliative/hospice again  10/13/22 Called [facility]  for f/u- no response  Called LUCIA- reji Locke- wife responded- explained to her that I had spoken with Shaylee Hernandez and to see if they had a family meeting  Also  patient did express that she feels comfort care is the route to go- reached out to Palliative team for follow up discussion on [address] and family meeting  10/14/22  Palliative team spoke with patient who today stated otherwise that she wants to do everything   Also Neuropsychiatry had evaluated patient and deemed no capacity, hence Palliative team has arranged a family meeting for Monday      10/17/22:  Per report, goals of care discussion was done and consensus is that patient will continue therapeutic treatment, will not transition to hospice at this point

## 2022-10-26 NOTE — ASSESSMENT & PLAN NOTE
· Renal function appears to have stabilized around creatinine of 1 5-1 7  · Patient intermittently refusing labs, trend as able  · Possibly new baseline per Nephrology  · Creatinine peaked to 2 0 before trending down to 1 8 this morning

## 2022-10-26 NOTE — PLAN OF CARE
Problem: Potential for Falls  Goal: Patient will remain free of falls  Description: INTERVENTIONS:  - Educate patient/family on patient safety including physical limitations  - Instruct patient to call for assistance with activity   - Consult OT/PT to assist with strengthening/mobility   - Keep Call bell within reach  - Keep bed low and locked with side rails adjusted as appropriate  - Keep care items and personal belongings within reach  - Initiate and maintain comfort rounds  - Make Fall Risk Sign visible to staff  - Offer Toileting everyHours, in advance of need  - Initiate/Maintainrm  - Obtain necessary fall risk management equipment:  - Apply yellow socks and bracelet for high fall risk patients  - Consider moving patient to room near nurses station  Outcome: Progressing     Problem: MOBILITY - ADULT  Goal: Maintain or return to baseline ADL function  Description: INTERVENTIONS:  -  Assess patient's ability to carry out ADLs; assess patient's baseline for ADL function and identify physical deficits which impact ability to perform ADLs (bathing, care of mouth/teeth, toileting, grooming, dressing, etc )  - Assess/evaluate cause of self-care deficits   - Assess range of motion  - Assess patient's mobility; develop plan if impaired  - Assess patient's need for assistive devices and provide as appropriate  - Encourage maximum independence but intervene and supervise when necessary  - Involve family in performance of ADLs  - Assess for home care needs following discharge   - Consider OT consult to assist with ADL evaluation and planning for discharge  - Provide patient education as appropriate  Outcome: Progressing  Goal: Maintains/Returns to pre admission functional level  Description: INTERVENTIONS:  - Perform BMAT or MOVE assessment daily    - Set and communicate daily mobility goal to care team and patient/family/caregiver     - Collaborate with rehabilitation services on mobility goals if consulted  - Perform Range of Mot  - Out of bed for toileting  - Record patient progress and toleration of activity level   Outcome: Progressing     Problem: INFECTION - ADULT  Goal: Absence or prevention of progression during hospitalization  Description: INTERVENTIONS:  - Assess and monitor for signs and symptoms of infection  - Monitor lab/diagnostic results  - Monitor all insertion sites, i e  indwelling lines, tubes, and drains  - Monitor endotracheal if appropriate and nasal secretions for changes in amount and color  - [place] appropriate cooling/warming therapies per order  - Administer medications as ordered  - Instruct and encourage patient and family to use good hand hygiene technique  - Identify and instruct in appropriate isolation precautions for identified infection/condition  Outcome: Progressing  Goal: Absence of fever/infection during neutropenic period  Description: INTERVENTIONS:  - Monitor WBC    Outcome: Progressing     Problem: SAFETY ADULT  Goal: Patient will remain free of falls  Description: INTERVENTIONS:  - Educate patient/family on patient safety including physical limitations  - Instruct patient to call for assistance with activity   - Consult OT/PT to assist with strengthening/mobility   - Keep Call bell within reach  - Keep bed low and locked with side rails adjusted as appropriate  - Keep care items and personal belongings within reach  - Initiate and maintain comfort rounds  - Make Fall Risk Sign visible to staff  - Offer Toileting every ours, in advance of need  - Initiate/Maintain arm  - Obtain necessary fall risk management equipment:  - Apply yellow socks and bracelet for high fall risk patients  - Consider moving patient to room near nurses station  Outcome: Progressing  Goal: Maintain or return to baseline ADL function  Description: INTERVENTIONS:  -  Assess patient's ability to carry out ADLs; assess patient's baseline for ADL function and identify physical deficits which impact ability to perform ADLs (bathing, care of mouth/teeth, toileting, grooming, dressing, etc )  - Assess/evaluate cause of self-care deficits   - Assess range of motion  - Assess patient's mobility; develop plan if impaired  - Assess patient's need for assistive devices and provide as appropriate  - Encourage maximum independence but intervene and supervise when necessary  - Involve family in performance of ADLs  - Assess for home care needs following discharge   - Consider OT consult to assist with ADL evaluation and planning for discharge  - Provide patient education as appropriate  Outcome: Progressing  Goal: Maintains/Returns to pre admission functional level  Description: INTERVENTIONS:  - Perform BMAT or MOVE assessment daily    - Set and communicate daily mobility goal to care team and patient/family/caregiver  - Collaborate with rehabilitation services on mobility goals if consulted  - Perform Rang  - Out of bed for toileting  - Record patient progress and toleration of activity level   Outcome: Progressing     Problem: DISCHARGE PLANNING  Goal: Discharge to home or other facility with appropriate resources  Description: INTERVENTIONS:  - Identify barriers to discharge w/patient and caregiver  - Arrange for needed discharge resources and transportation as appropriate  - Identify discharge learning needs (meds, wound care, etc )  - Arrange for interpretive services to assist at discharge as needed  - Refer to Case Management Department for coordinating discharge planning if the patient needs post-hospital services based on physician/advanced practitioner order or complex needs related to functional status, cognitive ability, or social support system  Outcome: Progressing     Problem: Knowledge Deficit  Goal: Patient/family/caregiver demonstrates understanding of disease process, treatment plan, medications, and discharge instructions  Description: Complete learning assessment and assess knowledge base    Interventions:  - Provide teaching at level of understanding  - Provide teaching via preferred learning methods  Outcome: Progressing     Problem: SKIN/TISSUE INTEGRITY - ADULT  Goal: Skin Integrity remains intact(Skin Breakdown Prevention)  Description: Assess:  -Perform Brade  -Assess extremities for adequate circulation and sensation     Bed Management:  -Have minimal linens on bed & keep smooth, unwrinkled  -Change linens as needed when moist or perspiring  -Avoid sitting or lying in one position for more thanours while in bed  -Keep HOB at egrees     Toileting:  -Offer bedside commode  -Assess for incontinence every   -Use incontinent care products after each incontinent episode such as    Activity:  -Mobilize patient times a day  -Encourage activity and walks on unit  -Encourage or provide ROM exercises   -Turn and reposition patient ever Hours  -Use appropriate equipment to lift or move patient in bed  -Instruct/ Assist with weight shifting every when out of bed in chair  -Consider limitation of chair timeour intervals    Skin Care:  -Avoid use of baby powder, tape, friction and shearing, hot water or constrictive clothing  -Relieve pressure over bony prominences using  -Do not massage red bony areas    Next Steps:  -Teach patient strategies to minimize risks such a  -Consider consults to  interdisciplinary teams such as  Outcome: Progressing  Goal: Incision(s), wounds(s) or drain site(s) healing without S/S of infection  Description: INTERVENTIONS  - Assess and document dressing, incision, wound bed, drain sites and surrounding tissue  - Provide patient and family education  - Perform skin care/dressing changes every   Outcome: Progressing  Goal: Pressure injury heals and does not worsen  Description: Interventions:  - Implement low air loss mattress or specialty surface (Criteria met)  - Apply silicone foam dressing  - Instruct/assist with weight shifting everyminutes when in chair   - Limit chair time tohour intervals  - Use special pressure reducing interventions such aswhen in chair   - Apply fecal or urinary incontinence containment device   - Perform passive or active ROM every   - Turn and reposition patient & offload bony prominences every hours   - Utilize friction reducing device or surface for transfers   - Consider consults to  interdisciplinary teams such as   - Use incontinent care products after each incontinent episode such a  - Consider nutrition services referral as needed  Outcome: Progressing     Problem: Prexisting or High Potential for Compromised Skin Integrity  Goal: Skin integrity is maintained or improved  Description: INTERVENTIONS:  - Identify patients at risk for skin breakdown  - Assess and monitor skin integrity  - Assess and monitor nutrition and hydration status  - Monitor labs   - Assess for incontinence   - Turn and reposition patient  - Assist with mobility/ambulation  - Relieve pressure over bony prominences  - Avoid friction and shearing  - Provide appropriate hygiene as needed including keeping skin clean and dry  - Evaluate need for skin moisturizer/barrier cream  - Collaborate with interdisciplinary team   - Patient/family teaching  - Consider wound care consult   Outcome: Progressing     Problem: Nutrition/Hydration-ADULT  Goal: Nutrient/Hydration intake appropriate for improving, restoring or maintaining nutritional needs  Description: Monitor and assess patient's nutrition/hydration status for malnutrition  Collaborate with interdisciplinary team and initiate plan and interventions as ordered  Monitor patient's weight and dietary intake as ordered or per policy  Utilize nutrition screening tool and intervene as necessary  Determine patient's food preferences and provide high-protein, high-caloric foods as appropriate       INTERVENTIONS:  - Monitor oral intake, urinary output, labs, and treatment plans  - Assess nutrition and hydration status and recommend course of action  - Evaluate amount of meals eaten  - Assist patient with eating if necessary   - Allow adequate time for meals  - Recommend/ encourage appropriate diets, oral nutritional supplements, and vitamin/mineral supplements  - Order, calculate, and assess calorie counts as needed  - Recommend, monitor, and adjust tube feedings and TPN/PPN based on assessed needs  - Assess need for intravenous fluids  - Provide specific nutrition/hydration education as appropriate  - Include patient/family/caregiver in decisions related to nutrition  Outcome: Progressing

## 2022-10-26 NOTE — ASSESSMENT & PLAN NOTE
· Noted on labs 10/24  · PTH intact appropriately decreased  · Nephrology following  · Hold all vitamin D and calcium supplements for now  · Started on IVF today     · Appreciate nephrology recs  · Lack of mobility could by playing a part

## 2022-10-26 NOTE — PROGRESS NOTES
Unable to place new iv site 4 attempts by 2 nurses picc team and stat nurse not available Dr Arnaldo Roland notified   I called P[facility] charge nurse who is going to have somebody try to get IV site

## 2022-10-26 NOTE — ASSESSMENT & PLAN NOTE
· Nephrology following  ·  Hyponatremia the setting of poor solute intake with gastric bypass, continue with salt tablets 2 g t i d , minimize free water flushes with tube feeds    · Sodium appears stable   · Continue to monitor

## 2022-10-26 NOTE — PROGRESS NOTES
Follow up Consultation    Nephrology   Magi Rockwell 76 y o  female MRN: [id number]  Unit/Bed#: [facility] 323-01 Encounter: [id number]      Physician Requesting Consult: Lula Swan MD        ASSESSMENT/PLAN:  77-year-old female with multiple comorbidities including bipolar disorder and CKD stage 3 presented with diarrhea and sepsis with prolonged hospital stay  Nephrology following for acute kidney injury  Acute kidney injury on CKD stage 3:  NOAH multifactorial most likely secondary to ischemic injury secondary to hemodynamic perturbations plus UTI/sepsis  After review of records In [facility] as well as Care everywhere it appears that the patient has a baseline Creatinine of 0 8-1 2 mg/dL  patient was admitted with a creatinine of 1 35 mg/dL on 08/31/2022  patient's creatinine today is at 1 82 mg/dL, stable and improving post IV fluid hydration  Will give additional normal saline 100 cc an hour for 1 L then DC  More recently creatinine has leveled off around 1 7-1 8 mg/dL  Hold further diuretics and albumin for now  check CMP in a m  Await renal recovery  Optimize hemodynamic status to avoid delay in renal recovery  Place on a renal diet when allowed diet order  Avoid nephrotoxins, adjust meds to appropriate GFR  Strict I/O  Daily weights  Urinary retention protocol if patient does not have a Fraga  Most likely has underlying CKD secondary to age-related nephron loss  will need to set up patient for follow up with Nephrology as an outpatient post hospitalization  for nephrology as an outpatient patient follows up with no nephrologist  Patient found to have no capacity to make informed medical decisions  Blood pressure/chronic hypotension:  current medications:  Midodrine 10 mg p o  T i d   recommendations:  Wean off of midodrine as tolerable  Optimize hemodynamics  Maintain MAP > 65mmHg  Avoid BP fluctuations      H/H/anemia:  most recent hemoglobin at 7 1 grams/deciliter  maintain hemoglobin greater than 8 grams/deciliter  Recent GI bleed  Status post recent EGD in 2022  Currently has PEG tube for feeds  Transfusion per primary team    Acid-base electrolytes:    Electrolytes:    Stable  Hyponatremia:   Most recent sodium at 135 mEq  Continue salt tablets 2 g p o  T i d  Check BMP in a m  Maintains on free water flushes 125 cc q 4 as she has tube feeds  Hypokalemia:  Most recent potassium 3 9 mEq  Resolved    Hyper calcemia:  Corrected calcium 11 3, improving with IV fluid hydration  Continue to monitor for now  Most recent intact PTH less than 6 3  Ionized calcium 1 34, minimally elevated  Vitamin-D 43  Hold all vitamin-D and calcium supplements for now  Will give normal saline at 100 cc an hour for 1 0L then DC  Likely some contribution from decreased more mobility    Acid-base:    Most recent bicarb at 23 stable, mild acidosis likely secondary to IV fluid hydration  Other medical problems:  Malnutrition:  Management per primary team   PEG tube in place  Follow-up with GI   UTI:  Management per primary team   Status post antibiotics      Thanks for the consult  Will continue to follow  Please call with questions/ concerns  Above-mentioned orders and Plan in terms of acute kidney injury was discussed with the team in depth via tiger text    Sana Rab Suan Apley, 10/26/2022, 11:11 AM              Objective :   Patient seen and examined in her room no overnight events hemodynamically stable remains afebrile status post IV fluid hydration yesterday happy to hear renal parameters improving however she appears pleasantly confused        PHYSICAL EXAM  /75   Pulse 81   Temp 97 8 °F (36 6 °C) (Oral)   Resp 18   Ht 5' 4" (1 626 m)   Wt 49 4 kg (109 lb)   SpO2 98%   BMI 18 71 kg/m²   Temp (24hrs), Av 4 °F (36 9 °C), Min:97 8 °F (36 6 °C), Max:98 9 °F (37 2 °C)        Intake/Output Summary (Last 24 hours) at 10/26/2022 1111  Last data filed at 10/25/2022 1501  Gross per 24 hour   Intake 240 ml   Output --   Net 240 ml       I/O last 24 hours: In: 240 [I V :240]  Out: -       Current Weight: Weight - Scale:  (unable to stand, no bed scale)  First Weight: Weight - Scale: 61 2 kg (135 lb)  Physical Exam  Vitals and nursing note reviewed  Constitutional:       General: She is not in acute distress  Appearance: Normal appearance  She is normal weight  She is ill-appearing  She is not toxic-appearing or diaphoretic  HENT:      Head: Normocephalic and atraumatic  Mouth/Throat:      Mouth: Mucous membranes are dry  Pharynx: Oropharynx is clear  No oropharyngeal exudate  Eyes:      General: No scleral icterus  Conjunctiva/sclera: Conjunctivae normal    Cardiovascular:      Rate and Rhythm: Normal rate  Heart sounds: Normal heart sounds  No friction rub  Pulmonary:      Effort: Pulmonary effort is normal  No respiratory distress  Breath sounds: Normal breath sounds  No stridor  No wheezing  Abdominal:      General: There is distension  Palpations: Abdomen is soft  Tenderness: There is no abdominal tenderness  Comments: PEG tube in place   Musculoskeletal:         General: No swelling  Cervical back: Normal range of motion and neck supple  No rigidity  Skin:     General: Skin is warm and dry  Coloration: Skin is not jaundiced  Neurological:      General: No focal deficit present  Mental Status: She is alert  She is disoriented  Psychiatric:         Mood and Affect: Mood normal          Behavior: Behavior normal              Review of Systems   Constitutional: Positive for fatigue  Negative for chills  HENT: Negative for congestion  Respiratory: Negative for cough, shortness of breath and wheezing  Cardiovascular: Negative for leg swelling  Gastrointestinal: Negative for abdominal pain and vomiting  Genitourinary: Negative for hematuria  Musculoskeletal: Negative for back pain     Neurological: Negative for headaches  Psychiatric/Behavioral: Positive for confusion  Negative for agitation  All other systems reviewed and are negative  Scheduled Meds:  Current Facility-Administered Medications   Medication Dose Route Frequency Provider Last Rate   • acetaminophen  975 mg Oral Q6H PRN Millicent Adler PA-C     • ALPRAZolam  0 25 mg Oral TID PRN Jeff Bentley, DO     • bisacodyl  10 mg Rectal Daily PRN Di Plasencia MD     • clonazePAM  3 mg Oral HS Jovita Mcdermott, DO     • folic acid  1 mg Oral Daily Jovita Mcdermott, DO     • guaiFENesin  600 mg Oral Q12H [facility] Millicent Adler PA-C     • heparin (porcine)  5,000 Units Subcutaneous [facility] Jeff Ped, DO     • levothyroxine  125 mcg Oral Q24H [place] Paige Deng MD     • metoclopramide  10 mg Intravenous Q6H PRN Di Plasencia MD     • midodrine  10 mg Oral TID AC Haley Merino MD     • ondansetron  4 mg Intravenous Q6H PRN Imtiaz Pugh, DO     • oxyCODONE  5 mg Oral Q6H Di Plasencia MD     • pantoprazole  40 mg Oral Early Morning Grey Cardoso MD     • polyethylene glycol  17 g Per PEG Tube BID Imtiaz Lexy, DO     • senna  2 tablet Oral HS Gillian Pretty MD     • sodium chloride  2 g Oral TID Nelly Oh DO     • thiamine  100 mg Oral Daily Jovita Mcdermott, DO     • traZODone  150 mg Oral HS Jovita Mcdermott, DO         PRN Meds: •  acetaminophen  •  ALPRAZolam  •  bisacodyl  •  metoclopramide  •  ondansetron    Continuous Infusions:       Invasive Devices: Invasive Devices  Report    Peripheral Intravenous Line  Duration           Peripheral IV 10/23/22 Dorsal (posterior); Left Hand 3 days          Drain  Duration           Gastrostomy/Enterostomy Percutaneous Endoscopic Gastrostomy (PEG) 20 Fr  LUQ 47 days    External Urinary Catheter 5 days                  LABORATORY:    Results from last 7 days   Lab Units 10/26/22  0618 10/25/22  0613 10/24/22  0607 10/23/22  0233   WBC Thousand/uL  --  12 28*  --   --    HEMOGLOBIN g/dL --  7 1*  --   --    HEMATOCRIT %  --  22 8*  --   --    PLATELETS Thousands/uL  --  501*  --   --    POTASSIUM mmol/L 3 9 4 3 3 4* 3 8   CHLORIDE mmol/L 106 102 102 103   CO2 mmol/L 23 26 25 28   BUN mg/dL 41* 45* 44* 43*   CREATININE mg/dL 1 82* 2 00* 1 90* 1 90*   CALCIUM mg/dL 9 7 10 4* 10 2* 10 4*   PHOSPHORUS mg/dL  --   --  3 9  --       rest all reviewed    RADIOLOGY:  CT head wo contrast   Final Result by Herb Will MD (10/11 1651)      No acute intracranial abnormality  Workstation performed: [id number]         CT spine cervical wo contrast   Final Result by Herb Will MD (10/11 1654)      No acute cervical spine fracture or traumatic malalignment  Workstation performed: [id number]         CT chest abdomen pelvis w contrast   Final Result by Angella Rothman MD (09/27 1215)      1  Patchy and groundglass opacities in the upper lobes  The appearance is most typical for pulmonary edema though in the setting of systemic inflammatory response syndrome, atypical infection is not excluded  2   Small bilateral pleural effusions and trace ascites  3   Focal thickening of the lower portion of the endometrium  While this is not expected in a patient of this age, this appears stable from 2016 suggesting a benign etiology  If there is clinical concern, follow-up ultrasound is recommended  4   Cholelithiasis  5   Small left upper lobe nodules, one of which is new from March 2022  Given the history of smoking, a follow-up chest CT is recommended in 12 months  The study was marked in EPIC for significant notification        Workstation performed: [id number]         VAS upper limb venous duplex scan, complete, bilateral   Final Result by Nora Garcia MD (09/25 2039)      VAS lower limb venous duplex study, complete bilateral   Final Result by Nora Garcia MD (09/25 2039)      CT abdomen pelvis wo contrast   Final Result by Robert Matt MD (09/23 0670)      1  Cholelithiasis with mild gallbladder luminal distention  Note is made of patient's elevated alkaline phosphatase  Ultrasound was recently performed (September 21, 2022), and correlation for clinical signs of acute cholecystitis is recommended  2   Large colonic stool burden without bowel obstruction  3   Trace bilateral pleural effusions  Workstation performed: [id number]         XR chest portable   Final Result by Laura Farrell MD (09/23 2747)      Increased interstitial markings, improved from 9/6/2022, question interstitial edema or infectious/inflammatory  Workstation performed: [id number]         US right upper quadrant with liver dopplers   Final Result by Robert Matt MD (09/22 7170)      Cholelithiasis without findings of acute cholecystitis  Workstation performed: [id number]         XR abdomen 1 view kub   Final Result by Shaylee Cain MD (09/15 0802)      No acute findings  Significant colon stool, correlate clinically for constipation  Workstation performed: [id number]         XR chest portable   Final Result by Citlali Shrestha DO (09/07 1103)      Right-sided PICC tip projects at the right subclavian vein  Mild bilateral peribronchial thickening, possibly related to chronic bronchitis  No acute infiltrates  Minimal linear atelectasis or scarring in the mid lungs bilaterally  Workstation performed: [id number]         CT abdomen pelvis with contrast   Final Result by Diego Larios MD (25/94 2086)      1  Diffuse wall thickening of the colon with surrounding fat stranding suspicious for colitis i e  infectious or inflammatory  2   Mild circumferential wall thickening of the urinary bladder  Correlate with urinalysis for possible cystitis  3   Cholelithiasis  The study was marked in [facility] for immediate notification        Workstation performed: [id number] Rest all reviewed    Portions of the record may have been created with voice recognition software  Occasional wrong word or "sound a like" substitutions may have occurred due to the inherent limitations of voice recognition software  Read the chart carefully and recognize, using context, where substitutions have occurred  If you have any questions, please contact the dictating provider

## 2022-10-26 NOTE — OCCUPATIONAL THERAPY NOTE
OT CANCEL NOTE:       10/26/22 1145   OT Last Visit   OT Visit Date 10/26/22   Note Type   Note Type Cancelled Session   Cancel Reasons Other   Assessment   Assessment Pt  declined to particiapte in OT treatment this AM  pt adamantly refusing even w education of importance of dailiy mobility

## 2022-10-26 NOTE — PROGRESS NOTES
PT was found to have 2 pills on bedside table from previous shift  A small white round pill with the number 5, and a round blue pill with C 1  Pills were disposed of in waste container

## 2022-10-26 NOTE — CASE MANAGEMENT
Case Management Progress Note    Patient name José Galo  Location [facility] 008-14 MRN [id number]  : 1945 Date 10/26/2022       LOS (days): 56  Geometric Mean LOS (GMLOS) (days): 4 80  Days to GMLOS:-51 6        Pt's LOS is 56 days  Pt is not yet medically ready for d/c  Pt has Hypercalcemia and Renal has been consulted to address this  Pt remains recommended for short-tem skilled rehab placement  Pt has been referred to several many SNFs, most of which were located in [place] and have declined the pt's referral due to various reasons  Pt has family located in [place], and was desiring to be closer to them during her rehab stay  However, pt may end up requiring long-term care placement, and if this is the case, then pt will require [id number] to cover her stay, and pt therefore cannot be placed in a SNF located within [place] due to this  CM resent out [address] referrals to SNFs located close to [place] border  CM to follow

## 2022-10-26 NOTE — PHYSICAL THERAPY NOTE
Physical Therapy Cancellation Note:    Pt declining inpt PT services for today  Pt reports "I'm not in the mood"  Cancel PT services for today and will cont to follow as able

## 2022-10-26 NOTE — PROGRESS NOTES
[address] [facility]  Progress Note - Pham Mccabe 1945, 76 y o  female MRN: [id number]  Unit/Bed#: [facility] 323-01 Encounter: [id number]  Primary Care Provider: Marisol Dillard MD   Date and time admitted to hospital: 8/30/2022 11:49 PM    Hypercalcemia  Assessment & Plan  · Noted on labs 10/24  · PTH intact appropriately decreased  · Nephrology following  · Hold all vitamin D and calcium supplements for now  · Started on IVF today  · Appreciate nephrology recs  · Lack of mobility could by playing a part    Hyperkalemia  Assessment & Plan    · Nephrology following - giving calcium gluconate, D50/insulin, sodium bicarb given October 5th  · Low-potassium pleasure feed diet  · Change tube feeds to Nephro  · k normal now    Urinary retention  Assessment & Plan  · Has required intermittent catheterizations however now voiding spontaneously  · Continue retention protocol    Hypotension  Assessment & Plan  · Continue midodrine 10 mg t i d     Acute encephalopathy  Assessment & Plan  · Patient has waxing waning mental status   · Cognition and mental status issues multifactorial in nature stemming from prolonged illness, numerous hospitalizations, chronic malnutrition, and chronic/recurrent infections    · Monitor clinically  · Delirium precautions  · Neuropsych consulted for capacity eval-patient does not have capacity per neuropsych  · Given history of bipolar disorder, psychiatric consult requested to ensure this has been optimized - his saw patient on 10/19, no changes necessary at this time  · Paranoid about seeing a large group of people in her room who came in through the window, otherwise stable mental status currently mentation is stable continue to monitor       Acute on chronic anemia  Assessment & Plan  · 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  · S/p recent EGD in 7/22 revealing: Residual marginal ulcer of the gastrojejunostomy anastomosis with improved size  · S/p EGD 9/2 revealing: Large, cratered ulcer in the gastrojejunal anastomosis   · Status post 1 unit PRBCs this admission  · Per previous provider d/w nephrology, requested to switch from PPI to pepcid given c/f eosinophilia, will need to hold off on this given EGD findings with ulcer for now      Bacteremia  Assessment & Plan  · Recent hx of staph epi bacteremia in 7/2022, presumed due to picc line  · Recurrence 1/2 set staph epi, 2nd set without growth  Was treated with intravenous vancomycin for presumed line infection  · S/p TAVIA (9/9) no evidence of vegetation  · Blood cultures had been negative however repeated again due to isolated fever which are negative x 5 days   · PICC and TPN discontinued, now has PEG tube  · Monitor off abx-monitoring for fevers, up trending leukocytosis  · Leukocytosis stable    Moderate protein-calorie malnutrition ([facility] )  Assessment & Plan  Malnutrition Findings:   Adult Malnutrition type: Chronic illness  Adult Degree of Malnutrition: Other severe protein calorie malnutrition  Malnutrition Characteristics: Weight loss, Muscle loss       360 Statement: Severe/Chronic malnutrition r/t condition, as evidenced by 4 8% wt loss x < 1 week (9/2/22: 125#, 9/5/22: 119#), and severe muscle mass depletion (temples/clavicle)  Treated with liberalized diet and nutrition supplements, possibly nutrition support pending goals of care    BMI Findings: Body mass index is 18 71 kg/m²  · Continue tube feeds    Diarrhea  Assessment & Plan  · Recent history of C diff colitis  · Treated with prophylactic dose of p o   Vancomycin while on antibiotics completed course     Ambulatory dysfunction  Assessment & Plan  · Will need rehab on discharge  · Case management for dispo planning       NOAH (acute kidney injury) ([facility] )  Assessment & Plan  · Renal function appears to have stabilized around creatinine of 1 5-1 7  · Patient intermittently refusing labs, trend as able  · Possibly new baseline per Nephrology  · Creatinine peaked to 2 0 before trending down to 1 8 this morning  H/O bariatric surgery - bypass  Assessment & Plan  · History of Savage-en-Y gastric bypass  Has had significant issues over the last 6 months or so with chronic malnutrition and an anastomotic ulcerations  When last seen by bariatric surgery, it was recommended to continue on prolonged TPN for nutritional optimization with possible revision of the Savage-en-Y in the future  · Given her recurrence PICC line infections and bacteremia, TPN has been discontinued  · Continue tube feeds for nutritional optimization  Patient has been tolerating tube feeds without any difficulty  Is also continued on pleasure feeds  · Will need significant improvement before consideration can be given to further surgical intervention      Hyponatremia  Assessment & Plan  · Nephrology following  ·  Hyponatremia the setting of poor solute intake with gastric bypass, continue with salt tablets 2 g t i d , minimize free water flushes with tube feeds  · Sodium appears stable   · Continue to monitor     Hypothyroidism  Assessment & Plan  · TSH elevated and fT4 low  · Endocrinology consulted - levothyroxine timing was adjusted to 2pm per Endocrinology recs (4 hours after tube feeds have stopped running)  · Repeat TFTs in 1 week per Endocrine - TSH checked and elevated at 27 improved from 67 T4 now normal; message sent to endocrine for further rec  * Sepsis ([facility] )  Assessment & Plan  · Initially met criteria with fever, tachypnea, tachycardia, and leukocytosis on admission  Sepsis secondary to recurrent bacteremia and PICC line infections  · PICC line has been removed and patient had PEG tube placed for nutritional support  · Finished full course of ertapenem for ESBL UTI  · Patient with recurrent fever 10/7/22- Started empiric vancomycin whic was dced  · Discussed with infectious disease     observe off of antibiotic , cultures- sent on 10/9/22- negative              VTE Pharmacologic Prophylaxis: VTE Score: 3 Moderate Risk (Score 3-4) - Pharmacological DVT Prophylaxis Ordered: heparin  Patient Centered Rounds: I performed bedside rounds with nursing staff today  Discussions with Specialists or Other Care Team Provider: CM and nephrology  Education and Discussions with Family / Patient: Updated  (brother) via phone  Time Spent for Care: 30 minutes  More than 50% of total time spent on counseling and coordination of care as described above  Current Length of Stay: 64 day(s)  Current Patient Status: Inpatient   Certification Statement: The patient will continue to require additional inpatient hospital stay due to Ongoing management for hypercalcemia and pending placement  Discharge Plan: Anticipate discharge in 48-72 hrs to rehab facility  Code Status: Level 3 - DNAR and DNI    Subjective:     Patient examined bedside, denies any active complaints  Had bowel movement this morning  Objective:     Vitals:   Temp (24hrs), Av 1 °F (36 7 °C), Min:97 5 °F (36 4 °C), Max:98 9 °F (37 2 °C)    Temp:  [97 5 °F (36 4 °C)-98 9 °F (37 2 °C)] 97 5 °F (36 4 °C)  HR:  [70-81] 74  Resp:  [16-20] 20  BP: (121-139)/(53-75) 139/64  SpO2:  [90 %-98 %] 94 %  Body mass index is 18 71 kg/m²  Input and Output Summary (last 24 hours): Intake/Output Summary (Last 24 hours) at 10/26/2022 1425  Last data filed at 10/25/2022 1501  Gross per 24 hour   Intake 240 ml   Output --   Net 240 ml       Physical Exam:   Physical Exam  Constitutional:       Appearance: Normal appearance  She is normal weight  HENT:      Head: Normocephalic and atraumatic  Mouth/Throat:      Mouth: Mucous membranes are dry  Eyes:      Extraocular Movements: Extraocular movements intact  Conjunctiva/sclera: Conjunctivae normal    Cardiovascular:      Rate and Rhythm: Normal rate and regular rhythm  Pulses: Normal pulses     Abdominal: General: Abdomen is flat  Bowel sounds are normal       Comments: G tube placed with no surrounding erythema  Musculoskeletal:         General: Normal range of motion  Cervical back: Normal range of motion and neck supple  Skin:     General: Skin is warm and dry  Neurological:      General: No focal deficit present  Mental Status: Mental status is at baseline  Additional Data:     Labs:  Results from last 7 days   Lab Units 10/25/22  0613   WBC Thousand/uL 12 28*   HEMOGLOBIN g/dL 7 1*   HEMATOCRIT % 22 8*   PLATELETS Thousands/uL 501*   NEUTROS PCT % 59   LYMPHS PCT % 17   MONOS PCT % 7   EOS PCT % 15*     Results from last 7 days   Lab Units 10/26/22  0618   SODIUM mmol/L 135   POTASSIUM mmol/L 3 9   CHLORIDE mmol/L 106   CO2 mmol/L 23   BUN mg/dL 41*   CREATININE mg/dL 1 82*   ANION GAP mmol/L 6   CALCIUM mg/dL 9 7   ALBUMIN g/dL 2 0*   TOTAL BILIRUBIN mg/dL 0 20   ALK PHOS U/L 92   ALT U/L 15   AST U/L 20   GLUCOSE RANDOM mg/dL 117         Results from last 7 days   Lab Units 10/26/22  0658 10/26/22  0035 10/25/22  1127 10/25/22  0612 10/24/22  1802 10/24/22  1155 10/24/22  0732 10/24/22  0627 10/24/22  0036 10/23/22  1744 10/23/22  1127 10/23/22  0757   POC GLUCOSE mg/dl 128 113 105 122 90 99 101 127 122 92 105 115               Lines/Drains:  Invasive Devices  Report    Peripheral Intravenous Line  Duration           Peripheral IV 10/23/22 Dorsal (posterior); Left Hand 3 days          Drain  Duration           Gastrostomy/Enterostomy Percutaneous Endoscopic Gastrostomy (PEG) 20 Fr  LUQ 47 days    External Urinary Catheter 5 days                      Imaging: No pertinent imaging reviewed      Recent Cultures (last 7 days):         Last 24 Hours Medication List:   Current Facility-Administered Medications   Medication Dose Route Frequency Provider Last Rate   • acetaminophen  975 mg Oral Q6H PRN Juan Dodson PA-C     • ALPRAZolam  0 25 mg Oral TID PRN Marck Biggs DO • bisacodyl  10 mg Rectal Daily PRN Lidia Campbell MD     • clonazePAM  3 mg Oral HS Jovita Baldemar, DO     • folic acid  1 mg Oral Daily Jovita Mcdermott, DO     • guaiFENesin  600 mg Oral Q12H [address] Marco Patel PA-C     • heparin (porcine)  5,000 Units Subcutaneous [facility] Curtiszoësamia Boudreaux, DO     • levothyroxine  125 mcg Oral Q24H [place] Vida Palacios MD     • metoclopramide  10 mg Intravenous Q6H PRN Lidia Campbell MD     • midodrine  10 mg Oral TID AC Haley Copeland MD     • ondansetron  4 mg Intravenous Q6H PRN Rena Mcmahan DO     • oxyCODONE  5 mg Oral Q6H Lidia Campbell MD     • pantoprazole  40 mg Oral Early Morning Courtney Ortiz MD     • polyethylene glycol  17 g Per PEG Tube BID Rena Mcmahan, DO     • senna  2 tablet Oral HS Mark Norris MD     • sodium chloride  100 mL/hr Intravenous Continuous Romulo Barraza MD     • sodium chloride  2 g Oral TID Jeff Weiner DO     • thiamine  100 mg Oral Daily Jovitakash Mcdermott, DO     • traZODone  150 mg Oral HS Rena Mcmahan DO          Today, Patient Was Seen By: Jcarlos Barrett    **Please Note: This note may have been constructed using a voice recognition system  **

## 2022-10-27 NOTE — NURSING NOTE
Pt was found attempting to get out of bed without staff assistance  Pt was assisted to the chair, and continued to attempt to stand up from the chair  Patient educated on fall risks and safety and refused teaching  While pt was back in the bed, the pt became uncooperative and combative with staff  Lap belt applied to patient for patient safety  Thi Davidson made aware, order obtained        Hebert Paterson  5:06 PM

## 2022-10-27 NOTE — PLAN OF CARE
Problem: Potential for Falls  Goal: Patient will remain free of falls  Description: INTERVENTIONS:  - Educate patient/family on patient safety including physical limitations  - Instruct patient to call for assistance with activity   - Consult OT/PT to assist with strengthening/mobility   - Keep Call bell within reach  - Keep bed low and locked with side rails adjusted as appropriate  - Keep care items and personal belongings within reach  - Initiate and maintain comfort rounds  - Make Fall Risk Sign visible to staff  - Offer Toileting every  Hours, in advance of need  - Initiate/Maintain alarm  - Obtain necessary fall risk management equipment:   - Apply yellow socks and bracelet for high fall risk patients  - Consider moving patient to room near nurses station  Outcome: Progressing     Problem: SAFETY ADULT  Goal: Patient will remain free of falls  Description: INTERVENTIONS:  - Educate patient/family on patient safety including physical limitations  - Instruct patient to call for assistance with activity   - Consult OT/PT to assist with strengthening/mobility   - Keep Call bell within reach  - Keep bed low and locked with side rails adjusted as appropriate  - Keep care items and personal belongings within reach  - Initiate and maintain comfort rounds  - Make Fall Risk Sign visible to staff  - Offer Toileting every  Hours, in advance of need  - Initiate/Maintain alarm  - Obtain necessary fall risk management equipment:   - Apply yellow socks and bracelet for high fall risk patients  - Consider moving patient to room near nurses station  Outcome: Progressing     Problem: SKIN/TISSUE INTEGRITY - ADULT  Goal: Skin Integrity remains intact(Skin Breakdown Prevention)  Description: Assess:  -Perform Gabe assessment every   -Clean and moisturize skin every   -Inspect skin when repositioning, toileting, and assisting with ADLS  -Assess under medical devices such as  every   -Assess extremities for adequate circulation and sensation     Bed Management:  -Have minimal linens on bed & keep smooth, unwrinkled  -Change linens as needed when moist or perspiring  -Avoid sitting or lying in one position for more than  hours while in bed  -Keep HOB at degrees     Toileting:  -Offer bedside commode  -Assess for incontinence every   -Use incontinent care products after each incontinent episode such as     Activity:  -Mobilize patient  times a day  -Encourage activity and walks on unit  -Encourage or provide ROM exercises   -Turn and reposition patient every  Hours  -Use appropriate equipment to lift or move patient in bed  -Instruct/ Assist with weight shifting every  when out of bed in chair  -Consider limitation of chair time hour intervals    Skin Care:  -Avoid use of baby powder, tape, friction and shearing, hot water or constrictive clothing  -Relieve pressure over bony prominences using   -Do not massage red bony areas    Next Steps:  -Teach patient strategies to minimize risks such as   -Consider consults to  interdisciplinary teams such as   Outcome: Progressing     Problem: Prexisting or High Potential for Compromised Skin Integrity  Goal: Skin integrity is maintained or improved  Description: INTERVENTIONS:  - Identify patients at risk for skin breakdown  - Assess and monitor skin integrity  - Assess and monitor nutrition and hydration status  - Monitor labs   - Assess for incontinence   - Turn and reposition patient  - Assist with mobility/ambulation  - Relieve pressure over bony prominences  - Avoid friction and shearing  - Provide appropriate hygiene as needed including keeping skin clean and dry  - Evaluate need for skin moisturizer/barrier cream  - Collaborate with interdisciplinary team   - Patient/family teaching  - Consider wound care consult   Outcome: Progressing

## 2022-10-27 NOTE — UTILIZATION REVIEW
Continued Stay Review    Date: 10/27/2022                        Current Patient Class: IP  Current Level of Care: MS    HPI:77 y o  female initially admitted on 08/31/2022     Assessment/Plan: Pt w/ waxing and waning of mental status  Lap belt applied  for pt's safety  Trying to get out of bed w/o assistance, uncooperative and combative to staff  Creatinine stable  Hold further flds per nephrology  Per CM Notes:No accepting SNF facility in [place]  If requiring LTC placement, she will require PA MA  Will resent out [address] referrals to SNFs located close to [place] border  Per nsg: Pt had a change in MS, lethargic, unresponsive to pain and voice  Rapid response called  Per Critical Care- Rapid Response: Pt w/ altered mental status, Glucose 73, returned to baseline upon arrival  Cont to mon neuro exam, consider spot EEG monitoring if episode occurs again      Vital Signs: /67   Pulse 88   Temp 98 °F (36 7 °C) (Oral)   Resp 18   Ht 5' 4" (1 626 m)   Wt 49 4 kg (109 lb)   SpO2 95%   BMI 18 71 kg/m²       Pertinent Labs/Diagnostic Results:       Results from last 7 days   Lab Units 10/25/22  0613   WBC Thousand/uL 12 28*   HEMOGLOBIN g/dL 7 1*   HEMATOCRIT % 22 8*   PLATELETS Thousands/uL 501*   NEUTROS ABS Thousands/µL 7 34         Results from last 7 days   Lab Units 10/26/22  0618 10/25/22  0613 10/24/22  0607 10/23/22  0233   SODIUM mmol/L 135 133* 134* 134*   POTASSIUM mmol/L 3 9 4 3 3 4* 3 8   CHLORIDE mmol/L 106 102 102 103   CO2 mmol/L 23 26 25 28   ANION GAP mmol/L 6 5 7 3*   BUN mg/dL 41* 45* 44* 43*   CREATININE mg/dL 1 82* 2 00* 1 90* 1 90*   EGFR ml/min/1 73sq m 26 23 24 24   CALCIUM mg/dL 9 7 10 4* 10 2* 10 4*   CALCIUM, IONIZED mmol/L  --  1 37* 1 34*  --    PHOSPHORUS mg/dL  --   --  3 9  --      Results from last 7 days   Lab Units 10/26/22  0618 10/25/22  0613 10/24/22  0607   AST U/L 20 23 17   ALT U/L 15 17 14   ALK PHOS U/L 92 104 87   TOTAL PROTEIN g/dL 7 3 7 6 7 4   ALBUMIN g/dL 2 0* 2 4* 2 3*   TOTAL BILIRUBIN mg/dL 0 20 0 32 0 23     Results from last 7 days   Lab Units 10/27/22  1839 10/27/22  0630 10/26/22  1830 10/26/22  0658 10/26/22  0035 10/25/22  1127 10/25/22  0612 10/24/22  1802 10/24/22  1155 10/24/22  0732 10/24/22  0627 10/24/22  0036   POC GLUCOSE mg/dl 73 101 103 128 113 105 122 90 99 101 127 122     Results from last 7 days   Lab Units 10/26/22  0618 10/25/22  0613 10/24/22  0607 10/23/22  0233   GLUCOSE RANDOM mg/dL 117 129 108 106       Results from last 7 days   Lab Units 10/24/22  0607   TSH 3RD GENERATON uIU/mL 27 300*     Medications:   Scheduled Medications:  clonazePAM, 3 mg, Oral, HS  folic acid, 1 mg, Oral, Daily  guaiFENesin, 600 mg, Oral, Q12H RHONA  heparin (porcine), 5,000 Units, Subcutaneous, Q8H RHONA  levothyroxine, 125 mcg, Oral, Q24H  midodrine, 10 mg, Oral, TID AC  oxyCODONE, 5 mg, Oral, Q6H  pantoprazole, 40 mg, Oral, Early Morning  polyethylene glycol, 17 g, Per PEG Tube, BID  senna, 2 tablet, Oral, HS  sodium chloride, 2 g, Oral, TID  thiamine, 100 mg, Oral, Daily  traZODone, 150 mg, Oral, HS      Continuous IV Infusions:  sodium chloride 0 9 % infusion  Rate: 100 mL/hr Dose: 100 mL/hr  Freq: Continuous Route: IV  Indications of Use: IV Hydration  Last Dose: 100 mL/hr (10/26/22 1704)  Start: 10/26/22 1130 End: 10/27/22 0303     PRN Meds:  acetaminophen, 975 mg, Oral, Q6H PRN  ALPRAZolam, 0 25 mg, Oral, TID PRN  bisacodyl, 10 mg, Rectal, Daily PRN  metoclopramide, 10 mg, Intravenous, Q6H PRN  ondansetron, 4 mg, Intravenous, Q6H PRN        Discharge Plan: [facility]    Network Utilization Review Department  ATTENTION: Please call with any questions or concerns to [phone] and carefully listen to the prompts so that you are directed to the right person   All voicemails are confidential   [place] all requests for admission clinical reviews, approved or denied determinations and any other requests to dedicated fax number below belonging to the campus where the patient is receiving treatment   List of dedicated fax numbers for the Facilities:  [address] DENIALS (Administrative/Medical Necessity) [phone]   [address] (Maternity/NICU/Pediatrics) [phone]   [address] [phone]   [address] [phone]   [address] [address] [address] [phone]   [address] [facility] Bety Duenas [facility] [place] 134 [address] [facility] [phone]

## 2022-10-27 NOTE — RAPID RESPONSE
Rapid Response Note  José Galo 76 y o  female MRN: [id number]  Unit/Bed#: [facility] 323-01 Encounter: [id number]    Rapid Response Notification(s):   Response called date/time:  10/28/2022 6:40 PM  Response team arrival date/time:  10/28/2022 6:42 PM  Response end date/time:  10/28/2022 6:50 PM  [facility]  Rapid response location:  [facility] unit ([facility])  Primary reason for rapid response call:  Acute change in neuro status    Rapid Response Intervention(s):   Airway:  None  Breathing:  None  Circulation:  None  Fluids administered:  None  Medications administered:  None       Assessment:   · Altered mental status    Plan:   · Glucose 73  · Patient returned back to baseline by the time rapid team arrived   · Continue to monitor neuro exam, if episode occurs again consider spot EEG monitoring  Rapid Response Outcome:   Transfer:  Remain on floor  Primary service notified of transfer: No      Family notified of transfer: yes  Family member contacted:  Per primary team     Background/Situation:   José Galo is a 76 y o  female admitted with diarrhea and increased weakness  Patient had been restless and agitated during the day, however had episode of altered mental status unresponsive per nursing  By the time rapid team arrived patient was back to baseline  Review of Systems    Objective:   Vitals:    10/26/22 2319 10/27/22 0908 10/27/22 1845 10/27/22 2100   BP: 129/61 163/76 137/67 145/71   BP Location: Right arm Right arm  Left arm   Pulse: 90 98 88 85   Resp:  18 18    Temp: (!) 97 2 °F (36 2 °C) 98 °F (36 7 °C)  98 1 °F (36 7 °C)   TempSrc: Axillary Oral  Oral   SpO2: 90% 93% 95%    Weight:       Height:         Physical Exam  Vitals reviewed  Constitutional:       Appearance: She is ill-appearing  Cardiovascular:      Rate and Rhythm: Normal rate  Pulmonary:      Breath sounds: Normal breath sounds  Abdominal:      Palpations: Abdomen is soft     Musculoskeletal: General: Normal range of motion  Cervical back: Neck supple  Skin:     General: Skin is warm  Capillary Refill: Capillary refill takes less than 2 seconds  Neurological:      Mental Status: She is alert  Comments: Opens eyes spontaneously  Follows commands all extremities  Confused           Portions of the record may have been created with voice recognition software  Occasional wrong word or "sound a like" substitutions may have occurred due to the inherent limitations of voice recognition software  Read the chart carefully and recognize, using context, where substitutions have occurred      TOLU Caba

## 2022-10-27 NOTE — NURSING NOTE
Upon assessment pt had a change in mental  Pt was lethargic and unresponsive to pain, and voice  Vital signs obtained, RRT called      Feliciano Rico  7:12 PM

## 2022-10-27 NOTE — PLAN OF CARE

## 2022-10-27 NOTE — QUICK NOTE
Progress Note - Palliative & Supportive Care       10/27/2022 8:41 AM -  Tara Macedoamone's chart and case were reviewed by Caye Landau  Mode of review included electronic chart check  Per review, symptoms remain controlled on current regimen and no changes are made at this time  Please continue the regimen in place, and review our last note for details  For dispo plan, please review Case Management notes  Palliative will sign off at this time  For urgent issues or any questions/concerns, please notify on-call provider via [employer]  You may also call our answering service 24/7 at   [facility] [place] [facility] [place] [facility]: [phone]  You can find me on [employer]!

## 2022-10-27 NOTE — PROGRESS NOTES
Follow up Consultation    Nephrology   Lars Mane 76 y o  female MRN: [id number]  Unit/Bed#: [facility] 323-01 Encounter: [id number]      Physician Requesting Consult: Lennie Sidhu MD        ASSESSMENT/PLAN:  69-year-old female with multiple comorbidities including bipolar disorder and CKD stage 3 presented with diarrhea and sepsis with prolonged hospital stay  Nephrology following for acute kidney injury  Acute kidney injury on CKD stage 3:  NOAH multifactorial most likely secondary to ischemic injury secondary to hemodynamic perturbations plus UTI/sepsis  After review of records In [facility] as well as Care everywhere it appears that the patient has a baseline Creatinine of 0 8-1 2 mg/dL  patient was admitted with a creatinine of 1 35 mg/dL on 08/31/2022  patient's creatinine yesterday is at 1 82 mg/dL, stable and improving post IV fluid hydration  Hold further IV fluids for now  More recently creatinine has leveled off around 1 7-1 8 mg/dL  Hold further diuretics and albumin for now  check CMP in a m  Await renal recovery  Optimize hemodynamic status to avoid delay in renal recovery  Place on a renal diet when allowed diet order  Avoid nephrotoxins, adjust meds to appropriate GFR  Strict I/O  Daily weights  Urinary retention protocol if patient does not have a Fraga  Most likely has underlying CKD secondary to age-related nephron loss  will need to set up patient for follow up with Nephrology as an outpatient post hospitalization  for nephrology as an outpatient patient follows up with no nephrologist  Patient found to have no capacity to make informed medical decisions  Blood pressure/chronic hypotension:  current medications:  Midodrine 10 mg p o  T i d   recommendations:  Wean off of midodrine as tolerable  Optimize hemodynamics  Maintain MAP > 65mmHg  Avoid BP fluctuations      H/H/anemia:  most recent hemoglobin at 7 1 grams/deciliter  maintain hemoglobin greater than 8 grams/deciliter  Recent GI bleed  Status post recent EGD in 2022  Currently has PEG tube for feeds  Transfusion per primary team    Acid-base electrolytes:    Electrolytes:    Stable  Hyponatremia:   Most recent sodium at 135 mEq  Continue salt tablets 2 g p o  T i d  Check BMP in a m  Maintains on free water flushes 125 cc q 4 as she has tube feeds  Hypokalemia:  Most recent potassium 3 9 mEq  Resolved    Hyper calcemia:  Corrected calcium 11 3, improving with IV fluid hydration  Continue to monitor for now  Most recent intact PTH less than 6 3  Ionized calcium 1 34, minimally elevated  Vitamin-D 43  Hold all vitamin-D and calcium supplements for now  Hold further IV fluids for now  Likely some contribution from decreased more mobility    Acid-base:    Most recent bicarb at 23 stable, mild acidosis likely secondary to IV fluid hydration  Other medical problems:  Malnutrition:  Management per primary team   PEG tube in place  Follow-up with GI   UTI:  Management per primary team   Status post antibiotics      Thanks for the consult  Will continue to follow  Please call with questions/ concerns  Above-mentioned orders and Plan in terms of acute kidney injury was discussed with the team in depth via tiger text    Haley Luna, 10/27/2022, 10:17 AM              Objective :   Patient seen and examined in room no overnight events hemodynamically stable remains afebrile urine output 500 cc plus  PHYSICAL EXAM  /76 (BP Location: Right arm)   Pulse 98   Temp 98 °F (36 7 °C) (Oral)   Resp 18   Ht 5' 4" (1 626 m)   Wt 49 4 kg (109 lb)   SpO2 93%   BMI 18 71 kg/m²   Temp (24hrs), Av 6 °F (36 4 °C), Min:97 2 °F (36 2 °C), Max:98 °F (36 7 °C)        Intake/Output Summary (Last 24 hours) at 10/27/2022 1017  Last data filed at 10/27/2022 0704  Gross per 24 hour   Intake 3000 ml   Output 500 ml   Net 2500 ml       I/O last 24 hours:   In: 3000 [I V :1400; NG/GT:500; Feedings:1100]  Out: 500 [Urine:500]      Current Weight: Weight - Scale:  (unable to stand, no bed scale)  First Weight: Weight - Scale: 61 2 kg (135 lb)  Physical Exam  Vitals and nursing note reviewed  Constitutional:       General: She is not in acute distress  Appearance: Normal appearance  She is normal weight  She is ill-appearing  She is not toxic-appearing or diaphoretic  HENT:      Head: Normocephalic and atraumatic  Mouth/Throat:      Mouth: Mucous membranes are moist       Pharynx: Oropharynx is clear  No oropharyngeal exudate  Eyes:      General: No scleral icterus  Conjunctiva/sclera: Conjunctivae normal    Cardiovascular:      Rate and Rhythm: Normal rate  Heart sounds: Normal heart sounds  No friction rub  Pulmonary:      Effort: Pulmonary effort is normal  No respiratory distress  Breath sounds: Normal breath sounds  No stridor  No wheezing  Abdominal:      General: There is no distension  Palpations: Abdomen is soft  Tenderness: There is no abdominal tenderness  Comments: PEG tube   Musculoskeletal:         General: No swelling  Cervical back: Normal range of motion and neck supple  No rigidity  Skin:     General: Skin is warm  Coloration: Skin is not jaundiced  Neurological:      General: No focal deficit present  Mental Status: She is alert  Mental status is at baseline  Psychiatric:         Mood and Affect: Mood normal          Behavior: Behavior normal              Review of Systems   Constitutional: Positive for fatigue  Negative for chills  HENT: Negative for congestion  Respiratory: Negative for cough, shortness of breath and wheezing  Cardiovascular: Negative for leg swelling  Gastrointestinal: Negative for abdominal pain, constipation, diarrhea and vomiting  Musculoskeletal: Negative for back pain  Skin: Negative for rash  Neurological: Negative for headaches     Psychiatric/Behavioral: Negative for agitation and confusion  All other systems reviewed and are negative  Scheduled Meds:  Current Facility-Administered Medications   Medication Dose Route Frequency Provider Last Rate   • acetaminophen  975 mg Oral Q6H PRN Evelyn Barrera PA-C     • ALPRAZolam  0 25 mg Oral TID PRN Iliana Hanson DO     • bisacodyl  10 mg Rectal Daily PRN Geronimo Alvarado MD     • clonazePAM  3 mg Oral HS Jovita Mcdermott, DO     • folic acid  1 mg Oral Daily Jovita Mcdermott, DO     • guaiFENesin  600 mg Oral Q12H [facility] Evelyn Barrera PA-C     • heparin (porcine)  5,000 Units Subcutaneous [facility] Iliana Hanson, DO     • levothyroxine  125 mcg Oral Q24H [place] Gael Hernandez MD     • metoclopramide  10 mg Intravenous Q6H PRN Geronimo Alvarado MD     • midodrine  10 mg Oral TID AC Haley Sofiya Muse MD     • ondansetron  4 mg Intravenous Q6H PRN Jordyn Mohamud DO     • oxyCODONE  5 mg Oral Q6H Geronimo Alvarado MD     • pantoprazole  40 mg Oral Early Morning Priscilla Orosco MD     • polyethylene glycol  17 g Per PEG Tube BID Jordyn Mohamud DO     • senna  2 tablet Oral HS Teri Palafox MD     • sodium chloride  2 g Oral TID July Arita DO     • thiamine  100 mg Oral Daily Jovita Mcdermott, DO     • traZODone  150 mg Oral HS Jovita Mcdermott, DO         PRN Meds: •  acetaminophen  •  ALPRAZolam  •  bisacodyl  •  metoclopramide  •  ondansetron    Continuous Infusions:       Invasive Devices:      Invasive Devices  Report    Peripheral Intravenous Line  Duration           Peripheral IV 10/26/22 Right Wrist <1 day          Drain  Duration           Gastrostomy/Enterostomy Percutaneous Endoscopic Gastrostomy (PEG) 20 Fr  LUQ 48 days                  LABORATORY:    Results from last 7 days   Lab Units 10/26/22  0618 10/25/22  0613 10/24/22  0607 10/23/22  0233   WBC Thousand/uL  --  12 28*  --   --    HEMOGLOBIN g/dL  --  7 1*  --   --    HEMATOCRIT %  --  22 8*  --   --    PLATELETS Thousands/uL  --  501*  --   --    POTASSIUM mmol/L 3 9 4 3 3 4* 3 8   CHLORIDE mmol/L 106 102 102 103   CO2 mmol/L 23 26 25 28   BUN mg/dL 41* 45* 44* 43*   CREATININE mg/dL 1 82* 2 00* 1 90* 1 90*   CALCIUM mg/dL 9 7 10 4* 10 2* 10 4*   PHOSPHORUS mg/dL  --   --  3 9  --       rest all reviewed    RADIOLOGY:  CT head wo contrast   Final Result by Beatriz Reese MD (10/11 1651)      No acute intracranial abnormality  Workstation performed: [id number]         CT spine cervical wo contrast   Final Result by Beatriz Reese MD (10/11 1654)      No acute cervical spine fracture or traumatic malalignment  Workstation performed: [id number]         CT chest abdomen pelvis w contrast   Final Result by Mosie Lanes, MD (09/27 1215)      1  Patchy and groundglass opacities in the upper lobes  The appearance is most typical for pulmonary edema though in the setting of systemic inflammatory response syndrome, atypical infection is not excluded  2   Small bilateral pleural effusions and trace ascites  3   Focal thickening of the lower portion of the endometrium  While this is not expected in a patient of this age, this appears stable from 2016 suggesting a benign etiology  If there is clinical concern, follow-up ultrasound is recommended  4   Cholelithiasis  5   Small left upper lobe nodules, one of which is new from March 2022  Given the history of smoking, a follow-up chest CT is recommended in 12 months  The study was marked in EPIC for significant notification  Workstation performed: [id number]         VAS upper limb venous duplex scan, complete, bilateral   Final Result by Jerry Albert MD (09/25 2039)      VAS lower limb venous duplex study, complete bilateral   Final Result by Jerry Albert MD (09/25 2039)      CT abdomen pelvis wo contrast   Final Result by Anu Rodriguez MD (09/23 1535)      1  Cholelithiasis with mild gallbladder luminal distention    Note is made of patient's elevated alkaline phosphatase  Ultrasound was recently performed (September 21, 2022), and correlation for clinical signs of acute cholecystitis is recommended  2   Large colonic stool burden without bowel obstruction  3   Trace bilateral pleural effusions  Workstation performed: [id number]         XR chest portable   Final Result by Randi Velasquez MD (09/23 1437)      Increased interstitial markings, improved from 9/6/2022, question interstitial edema or infectious/inflammatory  Workstation performed: [id number]         US right upper quadrant with liver dopplers   Final Result by Magnolia Quinn MD (09/22 0605)      Cholelithiasis without findings of acute cholecystitis  Workstation performed: [id number]         XR abdomen 1 view kub   Final Result by Anthony Trejo MD (09/15 0802)      No acute findings  Significant colon stool, correlate clinically for constipation  Workstation performed: [id number]         XR chest portable   Final Result by Presley Kuo DO (09/07 1103)      Right-sided PICC tip projects at the right subclavian vein  Mild bilateral peribronchial thickening, possibly related to chronic bronchitis  No acute infiltrates  Minimal linear atelectasis or scarring in the mid lungs bilaterally  Workstation performed: [id number]         CT abdomen pelvis with contrast   Final Result by Cruzito Ortiz MD (22/60 3566)      1  Diffuse wall thickening of the colon with surrounding fat stranding suspicious for colitis i e  infectious or inflammatory  2   Mild circumferential wall thickening of the urinary bladder  Correlate with urinalysis for possible cystitis  3   Cholelithiasis  The study was marked in [facility] for immediate notification  Workstation performed: [id number]           Rest all reviewed    Portions of the record may have been created with voice recognition software   Occasional wrong word or "sound a like" substitutions may have occurred due to the inherent limitations of voice recognition software  Read the chart carefully and recognize, using context, where substitutions have occurred  If you have any questions, please contact the dictating provider

## 2022-10-28 NOTE — RESPIRATORY THERAPY NOTE
RT Protocol Note  Adelfo Cardoso 76 y o  female MRN: [id number]  Unit/Bed#: [facility] 323-01 Encounter: [id number]    Assessment    Principal Problem:    Sepsis ([facility] )  Active Problems:    Hypothyroidism    Hyponatremia    Abnormal CT scan    H/O bariatric surgery - bypass    NOAH (acute kidney injury) ([facility] )    Ambulatory dysfunction    Diarrhea    Fall    Moderate protein-calorie malnutrition ([facility] )    Bacteremia    Acute on chronic anemia    Goals of care, counseling/discussion    Acute encephalopathy    Hypotension    Urinary retention    Hyperkalemia    Hypercalcemia      Home Pulmonary Medications:  none       Past Medical History:   Diagnosis Date    Anemia     Anxiety     Bipolar disorder (HCC)     Colon polyp     Disease of thyroid gland     Essential hypertension     Essential tremor     Fibromyalgia, primary     GERD (gastroesophageal reflux disease)     Inflammatory polyarthropathy (HCC)     Mammogram abnormal     Pressure injury of skin      Social History     Socioeconomic History    Marital status: Single     Spouse name: None    Number of children: None    Years of education: None    Highest education level: None   Occupational History    None   Tobacco Use    Smoking status: Former Smoker     Quit date:      Years since quittin 8    Smokeless tobacco: Never Used   Vaping Use    Vaping Use: Never used   Substance and Sexual Activity    Alcohol use:  Yes     Alcohol/week: 4 0 standard drinks     Types: 4 Glasses of wine per week     Comment: rare    Drug use: Never    Sexual activity: Not Currently   Other Topics Concern    None   Social History Narrative    None     Social Determinants of Health     Financial Resource Strain: Not on file   Food Insecurity: No Food Insecurity    Worried About [facility] in the Last Year: Never true    Ran Out of Food in the Last Year: Never true   Transportation Needs: No Transportation Needs    Lack of Transportation (Medical): No    Lack of Transportation (Non-Medical): No   Physical Activity: Not on file   Stress: Not on file   Social Connections: Not on file   Intimate Partner Violence: Not on file   Housing Stability: Low Risk     Unable to Pay for Housing in the Last Year: No    Number of Places Lived in the Last Year: 2    Unstable Housing in the Last Year: No       Subjective         Objective    Physical Exam:   Assessment Type: Assess only  General Appearance: Awake  Respiratory Pattern: Normal  Chest Assessment: Chest expansion symmetrical  Bilateral Breath Sounds: Coarse, Diminished    Vitals:  Blood pressure 136/65, pulse 90, temperature 98 1 °F (36 7 °C), temperature source Oral, resp  rate 20, height 5' 4" (1 626 m), weight 49 4 kg (109 lb), SpO2 92 %  Imaging and other studies: I have personally reviewed pertinent reports  Plan    Respiratory Plan: Mild Distress pathway        Resp Comments: Pt ordered on resp protocol for prn nebs  Pt does not have a pulm hx and does not take pulm meds at home

## 2022-10-28 NOTE — PHYSICAL THERAPY NOTE
PT Treatment       10/28/22 1512   PT Last Visit   PT Visit Date 10/28/22   Note Type   Note Type Treatment   Pain Assessment   Pain Assessment Tool FLACC   Pain Location/Orientation Location: Buttocks  (reported pain w/ rolling in bed)   Pain Rating: FLACC (Rest) - Face 0   Pain Rating: FLACC (Rest) - Legs 0   Pain Rating: FLACC (Rest) - Activity 0   Pain Rating: FLACC (Rest) - Cry 0   Pain Rating: FLACC (Rest) - Consolability 0   Score: FLACC (Rest) 0   Pain Rating: FLACC (Activity) - Face 1   Pain Rating: FLACC (Activity) - Legs 0   Pain Rating: FLACC (Activity) - Activity 0   Pain Rating: FLACC (Activity) - Cry 1   Pain Rating: FLACC (Activity) - Consolability 0   Score: FLACC (Activity) 2   Restrictions/Precautions   Other Precautions Contact/isolation; Bed Alarm; Fall Risk;Pain   General   Chart Reviewed Yes   Response to Previous Treatment Patient with no complaints from previous session  Family/Caregiver Present No   Cognition   Overall Cognitive Status Impaired   Arousal/Participation Alert; Cooperative   Attention Attends with cues to redirect   Orientation Level Oriented to person;Oriented to place   Following Commands Follows one step commands with increased time or repetition   Comments patient with intermittent confusion however mild agitation with correction  when therapist left room to get supplies patient found speaking aloud in room to herself; perseverates on the smell of urine and how "there should be shower for women"   Subjective   Subjective "I cannot stand the smell of urine"   Bed Mobility   Rolling R 5  Supervision   Additional items Bedrails; Increased time required   Rolling L 5  Supervision   Additional items Bedrails; Increased time required   Additional Comments patient received in bed with HOB approx 30 degrees and did not tolerated bed elevated more than 30 degrees during treatment session  verbalized that she was wet and therapist assisted with bed level ace-care   patient able to perform multiple bouts of rolling toward L and R sides for change of bed linens and ace-care  therapist dependently repositioned patient higher in bed with bed in trendelenberg position   Endurance Deficit   Endurance Deficit Yes   Endurance Deficit Description gross weakness, self expressed fatigue   Activity Tolerance   Activity Tolerance Patient limited by fatigue   Nurse Made Aware nelli to see per RN Estela   Exercises   Heelslides Supine;10 reps;Bilateral;AROM   Hip Abduction Supine;Bilateral;AROM;15 reps   Knee AROM Short Arc Quad Supine;10 reps;Bilateral;AROM  (pillow under knee)   Ankle Pumps Supine;20 reps;Bilateral;AROM   Assessment   Prognosis Fair   Problem List Decreased strength;Decreased endurance; Impaired balance;Decreased mobility; Decreased skin integrity   Assessment PT initiated treatment session in order to assist patient in achieving goals to improve bed mobility, LE strength, and overall activity tolerance  Patient participated in bed level care with supervision in setting of urinary incontinence  Patient demonstrated good tolerance for active LE there-ex as exhibited by no pain and performance of reps to fatigue  Disagreeable to sitting EOB/OOB mobility today  PT d/c recommendation remains for rehab  Patient will continue to benefit from continued skilled PT this admission to achieve maximal function and safety  Goals   Patient Goals to be clean   STG Expiration Date 11/07/22   PT Treatment Day 4   Plan   Treatment/Interventions Functional transfer training;LE strengthening/ROM; Therapeutic exercise; Endurance training;Patient/family training;Equipment eval/education; Bed mobility;Gait training;OT;Spoke to nursing   Progress Slow progress, decreased activity tolerance   PT Frequency 1-2x/wk   Recommendation   PT Discharge Recommendation Post acute rehabilitation services   AM-PAC Basic Mobility Inpatient   Turning in Bed Without Bedrails 3   Lying on Back to Sitting on Edge of Flat Bed 2 Moving Bed to Chair 2   Standing Up From Chair 2   Walk in Room 2   Climb 3-5 Stairs 1   Basic Mobility Inpatient Raw Score 12   Basic Mobility Standardized Score 32 23   Highest Level Of Mobility   -HLM Goal 4: Move to chair/commode   -HLM Achieved 2: Bed activities/Dependent transfer       The patient's AM-PAC Basic Mobility Inpatient Standardized Score is less than 42 9, suggesting this patient may benefit from discharge to post-acute rehabilitation services  Please also refer to the recommendation of the Physical Therapist for safe discharge planning      MALOU GARCIA PT, DPT

## 2022-10-28 NOTE — PROGRESS NOTES
Follow up Consultation    Nephrology   Joyce Reyes 76 y o  female MRN: [id number]  Unit/Bed#: [place] 323-01 Encounter: [id number]      Physician Requesting Consult: Nell Lind MD        ASSESSMENT/PLAN:  41-year-old female with multiple comorbidities including bipolar disorder and CKD stage 3 presented with diarrhea and sepsis with prolonged hospital stay  Nephrology following for acute kidney injury  Acute kidney injury on CKD stage 3:  NOAH multifactorial most likely secondary to ischemic injury secondary to hemodynamic perturbations plus UTI/sepsis  After review of records In [facility] as well as Care everywhere it appears that the patient has a baseline Creatinine of 0 8-1 2 mg/dL  patient was admitted with a creatinine of 1 35 mg/dL on 08/31/2022  patient's creatinine 10/26 is at 1 82 mg/dL, stable and improving post IV fluid hydration  Hold further IV fluids for now  More recently creatinine has leveled off around 1 7-1 8 mg/dL  Hold further diuretics and albumin for now  todays labs are pending still, trying to contact nursing staff to obtain labs  check CMP in a m  Await renal recovery  Optimize hemodynamic status to avoid delay in renal recovery  Place on a renal diet when allowed diet order  Avoid nephrotoxins, adjust meds to appropriate GFR  Strict I/O  Daily weights  Urinary retention protocol if patient does not have a Fraga  Most likely has underlying CKD secondary to age-related nephron loss  will need to set up patient for follow up with Nephrology as an outpatient post hospitalization  for nephrology as an outpatient patient follows up with no nephrologist  Patient found to have no capacity to make informed medical decisions  Blood pressure/chronic hypotension:  current medications:  Midodrine 10 mg p o  T i d   recommendations:  Wean off of midodrine as tolerable  Optimize hemodynamics  Maintain MAP > 65mmHg  Avoid BP fluctuations      H/H/anemia:  most recent hemoglobin at 7 1 grams/deciliter  maintain hemoglobin greater than 8 grams/deciliter  Recent GI bleed  Status post recent EGD in 2022  Currently has PEG tube for feeds  Transfusion per primary team    Acid-base electrolytes:    Electrolytes:    Stable  Hyponatremia:   Most recent sodium at 135 mEq  Continue salt tablets 2 g p o  T i d  Check BMP in a m  Maintains on free water flushes 125 cc q 4 as she has tube feeds  Hypokalemia:  Most recent potassium 3 9 mEq  Resolved    Hyper calcemia:  Corrected calcium 11 3, improving with IV fluid hydration  Continue to monitor for now  Most recent intact PTH less than 6 3  Ionized calcium 1 34, minimally elevated  Vitamin-D 43  Hold all vitamin-D and calcium supplements for now  Hold further IV fluids for now  Likely some contribution from decreased more mobility    Acid-base:    Most recent bicarb at 23 stable, mild acidosis likely secondary to IV fluid hydration  Other medical problems:  Malnutrition:  Management per primary team   PEG tube in place  Follow-up with GI   UTI:  Management per primary team   Status post antibiotics      Thanks for the consult  Will continue to follow  Please call with questions/ concerns  Above-mentioned orders and Plan in terms of acute kidney injury was discussed with the team in depth via tiger text  Nephrology will follow peripherally until labs are available  Haley Turner, 10/28/2022, 7:38 AM              Objective :   Patient seen and examined in her room remains afebrile urine output 768 cc    Has no complaints to start to be in the hospital      PHYSICAL EXAM  /65 (BP Location: Left arm)   Pulse 90   Temp 98 1 °F (36 7 °C) (Oral)   Resp 20   Ht 5' 4" (1 626 m)   Wt 49 4 kg (109 lb)   SpO2 92%   BMI 18 71 kg/m²   Temp (24hrs), Av 1 °F (36 7 °C), Min:98 °F (36 7 °C), Max:98 1 °F (36 7 °C)        Intake/Output Summary (Last 24 hours) at 10/28/2022 0738  Last data filed at 10/27/2022 1220  Gross per 24 hour   Intake 695 ml   Output --   Net 695 ml       I/O last 24 hours: In: 2880 [P O :350; I V :1400; NG/GT:250; Feedings:880]  Out: 768 [Urine:768]      Current Weight: Weight - Scale:  (unable to stand, no bed scale)  First Weight: Weight - Scale: 61 2 kg (135 lb)  Physical Exam  Vitals and nursing note reviewed  Constitutional:       General: She is not in acute distress  Appearance: Normal appearance  She is normal weight  She is not ill-appearing, toxic-appearing or diaphoretic  HENT:      Head: Normocephalic and atraumatic  Mouth/Throat:      Mouth: Mucous membranes are moist       Pharynx: Oropharynx is clear  No oropharyngeal exudate  Eyes:      General: No scleral icterus  Conjunctiva/sclera: Conjunctivae normal    Cardiovascular:      Rate and Rhythm: Normal rate  Heart sounds: Normal heart sounds  No friction rub  Pulmonary:      Effort: Pulmonary effort is normal  No respiratory distress  Breath sounds: Normal breath sounds  No stridor  No wheezing  Abdominal:      General: There is no distension  Palpations: Abdomen is soft  There is no mass  Tenderness: There is no abdominal tenderness  Comments: PEG in place   Musculoskeletal:         General: No swelling  Cervical back: Normal range of motion and neck supple  No rigidity  Skin:     General: Skin is warm  Coloration: Skin is not jaundiced  Neurological:      General: No focal deficit present  Mental Status: She is alert and oriented to person, place, and time  Psychiatric:         Mood and Affect: Mood normal          Behavior: Behavior normal              Review of Systems   Constitutional: Positive for fatigue  Negative for chills  HENT: Negative for congestion  Respiratory: Negative for cough, shortness of breath and wheezing  Cardiovascular: Negative for leg swelling  Gastrointestinal: Negative for abdominal pain, diarrhea and vomiting  Genitourinary: Negative for dysuria  Musculoskeletal: Negative for back pain  Neurological: Negative for headaches  Psychiatric/Behavioral: Positive for confusion  Negative for agitation  All other systems reviewed and are negative  Scheduled Meds:  Current Facility-Administered Medications   Medication Dose Route Frequency Provider Last Rate   • acetaminophen  975 mg Oral Q6H PRN Marcellus Arechiga PA-C     • ALPRAZolam  0 25 mg Oral TID PRN Sharren Saint, DO     • bisacodyl  10 mg Rectal Daily PRN Ion Samuel MD     • clonazePAM  3 mg Oral HS Jovita Mcdermott, DO     • folic acid  1 mg Oral Daily Jovita Mcdermott, DO     • guaiFENesin  600 mg Oral Q12H [address] Marcellus Arechiga PA-C     • heparin (porcine)  5,000 Units Subcutaneous [facility] Sharren Saint, DO     • levothyroxine  125 mcg Oral Q24H Oluwatomisin aR Barlow MD     • metoclopramide  10 mg Intravenous Q6H PRN Ion Samuel MD     • midodrine  10 mg Oral TID AC Haley Copeland MD     • ondansetron  4 mg Intravenous Q6H PRN Denver Mueller, DO     • oxyCODONE  5 mg Oral Q6H Ion Samuel MD     • pantoprazole  40 mg Oral Early Morning Eva Mcdonald MD     • polyethylene glycol  17 g Per PEG Tube BID Blanchie Snjenni, DO     • senna  2 tablet Oral HS Ba Vu MD     • sodium chloride  2 g Oral TID Teri Poet, DO     • thiamine  100 mg Oral Daily Jovita Mcdermott, DO     • traZODone  150 mg Oral HS Jovita Mcdermott, DO         PRN Meds: •  acetaminophen  •  ALPRAZolam  •  bisacodyl  •  metoclopramide  •  ondansetron    Continuous Infusions:       Invasive Devices:      Invasive Devices  Report    Peripheral Intravenous Line  Duration           Peripheral IV 10/26/22 Right Wrist 1 day          Drain  Duration           Gastrostomy/Enterostomy Percutaneous Endoscopic Gastrostomy (PEG) 20 Fr  LUQ 49 days                  LABORATORY:    Results from last 7 days   Lab Units 10/26/22  0618 10/25/22  0613 10/24/22  0607 10/23/22  0233   WBC Thousand/uL  --  12 28*  --   --    HEMOGLOBIN g/dL  --  7 1*  --   --    HEMATOCRIT %  --  22 8*  --   --    PLATELETS Thousands/uL  --  501*  --   --    POTASSIUM mmol/L 3 9 4 3 3 4* 3 8   CHLORIDE mmol/L 106 102 102 103   CO2 mmol/L 23 26 25 28   BUN mg/dL 41* 45* 44* 43*   CREATININE mg/dL 1 82* 2 00* 1 90* 1 90*   CALCIUM mg/dL 9 7 10 4* 10 2* 10 4*   PHOSPHORUS mg/dL  --   --  3 9  --       rest all reviewed    RADIOLOGY:  CT head wo contrast   Final Result by Juan Antonio Dash MD (10/11 1651)      No acute intracranial abnormality  Workstation performed: [id number]         CT spine cervical wo contrast   Final Result by Juan Antonio Dash MD (10/11 1654)      No acute cervical spine fracture or traumatic malalignment  Workstation performed: [id number]         CT chest abdomen pelvis w contrast   Final Result by Byron Watson MD (09/27 1215)      1  Patchy and groundglass opacities in the upper lobes  The appearance is most typical for pulmonary edema though in the setting of systemic inflammatory response syndrome, atypical infection is not excluded  2   Small bilateral pleural effusions and trace ascites  3   Focal thickening of the lower portion of the endometrium  While this is not expected in a patient of this age, this appears stable from 2016 suggesting a benign etiology  If there is clinical concern, follow-up ultrasound is recommended  4   Cholelithiasis  5   Small left upper lobe nodules, one of which is new from March 2022  Given the history of smoking, a follow-up chest CT is recommended in 12 months  The study was marked in EPIC for significant notification        Workstation performed: [id number]         VAS upper limb venous duplex scan, complete, bilateral   Final Result by Kerri Malcolm MD (09/25 2039)      VAS lower limb venous duplex study, complete bilateral   Final Result by Kerri Malcolm MD (09/25 2039)      CT abdomen pelvis wo contrast   Final Result by Florecita Brannon MD (09/23 1535)      1  Cholelithiasis with mild gallbladder luminal distention  Note is made of patient's elevated alkaline phosphatase  Ultrasound was recently performed (September 21, 2022), and correlation for clinical signs of acute cholecystitis is recommended  2   Large colonic stool burden without bowel obstruction  3   Trace bilateral pleural effusions  Workstation performed: [id number]         XR chest portable   Final Result by Antonio Victor MD (09/23 1437)      Increased interstitial markings, improved from 9/6/2022, question interstitial edema or infectious/inflammatory  Workstation performed: [id number]         US right upper quadrant with liver dopplers   Final Result by Florecita Brannon MD (09/22 2223)      Cholelithiasis without findings of acute cholecystitis  Workstation performed: [id number]         XR abdomen 1 view kub   Final Result by Letty Leal MD (09/15 0802)      No acute findings  Significant colon stool, correlate clinically for constipation  Workstation performed: [id number]         XR chest portable   Final Result by Jose Guadalupe Villarreal DO (09/07 1103)      Right-sided PICC tip projects at the right subclavian vein  Mild bilateral peribronchial thickening, possibly related to chronic bronchitis  No acute infiltrates  Minimal linear atelectasis or scarring in the mid lungs bilaterally  Workstation performed: [id number]         CT abdomen pelvis with contrast   Final Result by Antonino Lopez MD (92/11 1672)      1  Diffuse wall thickening of the colon with surrounding fat stranding suspicious for colitis i e  infectious or inflammatory  2   Mild circumferential wall thickening of the urinary bladder  Correlate with urinalysis for possible cystitis  3   Cholelithiasis        The study was marked in [facility] for immediate notification  Workstation performed: [id number]           Rest all reviewed    Portions of the record may have been created with voice recognition software  Occasional wrong word or "sound a like" substitutions may have occurred due to the inherent limitations of voice recognition software  Read the chart carefully and recognize, using context, where substitutions have occurred  If you have any questions, please contact the dictating provider

## 2022-10-28 NOTE — PLAN OF CARE
Problem: PHYSICAL THERAPY ADULT  Goal: Performs mobility at highest level of function for planned discharge setting  See evaluation for individualized goals  Description:    Equipment Recommended:  (RW)       See flowsheet documentation for full assessment, interventions and recommendations  Outcome: Not Progressing  Note: Prognosis: Fair  Problem List: Decreased strength, Decreased endurance, Impaired balance, Decreased mobility, Decreased skin integrity  Assessment: PT initiated treatment session in order to assist patient in achieving goals to improve bed mobility, LE strength, and overall activity tolerance  Patient participated in bed level care with supervision in setting of urinary incontinence  Patient demonstrated good tolerance for active LE there-ex as exhibited by no pain and performance of reps to fatigue  Disagreeable to sitting EOB/OOB mobility today  PT d/c recommendation remains for rehab  Patient will continue to benefit from continued skilled PT this admission to achieve maximal function and safety  Barriers to Discharge: Inaccessible home environment, Decreased caregiver support     PT Discharge Recommendation: Post acute rehabilitation services    See flowsheet documentation for full assessment

## 2022-10-28 NOTE — ASSESSMENT & PLAN NOTE
· Noted on labs 10/24  · PTH intact appropriately decreased     · Nephrology following  · Hold all vitamin D and calcium supplements for now  · Appreciate nephrology recs  · Lack of mobility could by playing a part

## 2022-10-28 NOTE — CASE MANAGEMENT
Case Management Progress Note    Patient name Jose Monzon  Location [facility]-13 MRN [id number]  : 1945 Date 10/28/2022       LOS (days): 58  Geometric Mean LOS (GMLOS) (days): 4 80  Days to GMLOS:-53 6        Pt's LOS is 58 days  Pt is now medically stable and ready for d/c  Pt remains recommended for short-term skilled rehab placement for her aftercare plan  Pt has been referred to many several SNFs, and of these there is only one facility willing to accept the pt for placement, which is [facility]- SNF  CM shared this info w/ pt's family who reported they were agreeable to accept placement  [facility] liaxochitl Bowen (c: [phone]) informed CM that there are currently no beds available for the pt at this time, and the next availability won't be until 22  CM shared this info w/ EILEEN Lee and  Supervisor Aziza De Dios CM to follow

## 2022-10-28 NOTE — ASSESSMENT & PLAN NOTE
· Renal function appears to have stabilized around creatinine of 1 5-1 7  · Patient intermittently refusing labs, trend as able  · Possibly new baseline per Nephrology    · Creatinine peaked to 2 0 before trending down to 1 8

## 2022-10-28 NOTE — PLAN OF CARE
Problem: MOBILITY - ADULT  Goal: Maintain or return to baseline ADL function  Description: INTERVENTIONS:  -  Assess patient's ability to carry out ADLs; assess patient's baseline for ADL function and identify physical deficits which impact ability to perform ADLs (bathing, care of mouth/teeth, toileting, grooming, dressing, etc )  - Assess/evaluate cause of self-care deficits   - Assess range of motion  - Assess patient's mobility; develop plan if impaired  - Assess patient's need for assistive devices and provide as appropriate  - Encourage maximum independence but intervene and supervise when necessary  - Involve family in performance of ADLs  - Assess for home care needs following discharge   - Consider OT consult to assist with ADL evaluation and planning for discharge  - Provide patient education as appropriate  Outcome: Progressing     Problem: INFECTION - ADULT  Goal: Absence or prevention of progression during hospitalization  Description: INTERVENTIONS:  - Assess and monitor for signs and symptoms of infection  - Monitor lab/diagnostic results  - Monitor all insertion sites, i e  indwelling lines, tubes, and drains  - Monitor endotracheal if appropriate and nasal secretions for changes in amount and color  - Fisher appropriate cooling/warming therapies per order  - Administer medications as ordered  - Instruct and encourage patient and family to use good hand hygiene technique  - Identify and instruct in appropriate isolation precautions for identified infection/condition  Outcome: Progressing     Problem: MOBILITY - ADULT  Goal: Maintains/Returns to pre admission functional level  Description: INTERVENTIONS:  - Perform BMAT or MOVE assessment daily    - Set and communicate daily mobility goal to care team and patient/family/caregiver  - Collaborate with rehabilitation services on mobility goals if consulted  - Perform Range of Motion **3* times a day  - Reposition patient every **2* hours    - Dangle patient **3* times a day  - Stand patient **3* times a day  - Ambulate patient **3* times a day  - Out of bed to chair *3** times a day   - Out of bed for meals **3* times a day  - Out of bed for toileting  - Record patient progress and toleration of activity level   Outcome: Progressing     Problem: SAFETY ADULT  Goal: Patient will remain free of falls  Description: INTERVENTIONS:  - Educate patient/family on patient safety including physical limitations  - Instruct patient to call for assistance with activity   - Consult OT/PT to assist with strengthening/mobility   - Keep Call bell within reach  - Keep bed low and locked with side rails adjusted as appropriate  - Keep care items and personal belongings within reach  - Initiate and maintain comfort rounds  - Make Fall Risk Sign visible to staff  - Offer Toileting every **2* Hours, in advance of need  - Initiate/Maintain *bed**alarm  - Apply yellow socks and bracelet for high fall risk patients  - Consider moving patient to room near nurses station  Outcome: Progressing     Problem: SAFETY ADULT  Goal: Maintain or return to baseline ADL function  Description: INTERVENTIONS:  -  Assess patient's ability to carry out ADLs; assess patient's baseline for ADL function and identify physical deficits which impact ability to perform ADLs (bathing, care of mouth/teeth, toileting, grooming, dressing, etc )  - Assess/evaluate cause of self-care deficits   - Assess range of motion  - Assess patient's mobility; develop plan if impaired  - Assess patient's need for assistive devices and provide as appropriate  - Encourage maximum independence but intervene and supervise when necessary  - Involve family in performance of ADLs  - Assess for home care needs following discharge   - Consider OT consult to assist with ADL evaluation and planning for discharge  - Provide patient education as appropriate  Outcome: Progressing

## 2022-10-28 NOTE — ASSESSMENT & PLAN NOTE
· Patient has waxing waning mental status   · Cognition and mental status issues multifactorial in nature stemming from prolonged illness, numerous hospitalizations, chronic malnutrition, and chronic/recurrent infections  · Monitor clinically  · Delirium precautions  · Neuropsych consulted for capacity eval-patient does not have capacity per neuropsych  · Given history of bipolar disorder, psychiatric consult requested to ensure this has been optimized - his saw patient on 10/19, no changes necessary at this time  · Paranoid about seeing a large group of people in her room who came in through the window, otherwise stable mental status currently mentation is stable continue to monitor   · Rapid response was called on 10/27 evening as she was found unresponsive by the nurse, but she was back to her baseline awake and alert by the time every but he reached    She has not had any such concerns again

## 2022-10-28 NOTE — ASSESSMENT & PLAN NOTE
· Renal function appears to have stabilized around creatinine of 1 5-1 7  · Patient intermittently refusing labs, trend as able  · Possibly new baseline per Nephrology    · Creatinine peaked to 2 0 before trending down

## 2022-10-28 NOTE — PROGRESS NOTES
[address] [facility]  Progress Note - Lenwood Fabry 1945, 76 y o  female MRN: [id number]  Unit/Bed#: [facility] 323-01 Encounter: [id number]  Primary Care Provider: Alice Alvarado MD   Date and time admitted to hospital: 8/30/2022 11:49 PM    Hypercalcemia  Assessment & Plan  · Noted on labs 10/24  · PTH intact appropriately decreased  · Nephrology following  · Hold all vitamin D and calcium supplements for now  · Appreciate nephrology recs  · Lack of mobility could by playing a part    Hyperkalemia  Assessment & Plan    · Nephrology following - giving calcium gluconate, D50/insulin, sodium bicarb given October 5th  · Low-potassium pleasure feed diet  · Change tube feeds to Nephro  · k normal now    Urinary retention  Assessment & Plan  · Has required intermittent catheterizations however now voiding spontaneously  · Continue retention protocol    Hypotension  Assessment & Plan  · Continue midodrine 10 mg t i d     Acute encephalopathy  Assessment & Plan  · Patient has waxing waning mental status   · Cognition and mental status issues multifactorial in nature stemming from prolonged illness, numerous hospitalizations, chronic malnutrition, and chronic/recurrent infections    · Monitor clinically  · Delirium precautions  · Neuropsych consulted for capacity eval-patient does not have capacity per neuropsych  · Given history of bipolar disorder, psychiatric consult requested to ensure this has been optimized - his saw patient on 10/19, no changes necessary at this time  · Paranoid about seeing a large group of people in her room who came in through the window, otherwise stable mental status currently mentation is stable continue to monitor       Acute on chronic anemia  Assessment & Plan  · 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  · S/p recent EGD in 7/22 revealing: Residual marginal ulcer of the gastrojejunostomy anastomosis with improved size  · S/p EGD 9/2 revealing: Large, cratered ulcer in the gastrojejunal anastomosis   · Status post 1 unit PRBCs this admission  · Per previous provider d/w nephrology, requested to switch from PPI to pepcid given c/f eosinophilia, will need to hold off on this given EGD findings with ulcer for now      Bacteremia  Assessment & Plan  · Recent hx of staph epi bacteremia in 7/2022, presumed due to picc line  · Recurrence 1/2 set staph epi, 2nd set without growth  Was treated with intravenous vancomycin for presumed line infection  · S/p TAVIA (9/9) no evidence of vegetation  · Blood cultures had been negative however repeated again due to isolated fever which are negative x 5 days   · PICC and TPN discontinued, now has PEG tube  · Monitor off abx-monitoring for fevers, up trending leukocytosis  · Leukocytosis stable    Moderate protein-calorie malnutrition ([address] )  Assessment & Plan  Malnutrition Findings:   Adult Malnutrition type: Chronic illness  Adult Degree of Malnutrition: Other severe protein calorie malnutrition  Malnutrition Characteristics: Weight loss, Muscle loss       360 Statement: Severe/Chronic malnutrition r/t condition, as evidenced by 4 8% wt loss x < 1 week (9/2/22: 125#, 9/5/22: 119#), and severe muscle mass depletion (temples/clavicle)  Treated with liberalized diet and nutrition supplements, possibly nutrition support pending goals of care    BMI Findings: Body mass index is 18 71 kg/m²     · Continue tube feeds    Fall  Assessment & Plan  · 10/11/22 afternoon and rapid response was called for fall  -as per RR note and per patient, she was leaning forward , getting gou tof the bed  to grab a pillow, her leg got caught  and she fell  -denies head strike, loss of consciousness, pain, any lightheadaness, diaphoresis before or after fall  -patient moving all extremities after  Fall  - vital signs stable  -patient was placed on soft C-collar and CT head as well as cervical spine was obtained stat-result showed no fracture or subluxation  -fall precaution  -d/w brother Viky Ford and updated her status/incident of fall   Also discussed regardign GOC    Diarrhea  Assessment & Plan  · Recent history of C diff colitis  · Treated with prophylactic dose of p o  Vancomycin while on antibiotics completed course     Ambulatory dysfunction  Assessment & Plan  · Will need rehab on discharge  · Case management for dispo planning       NOAH (acute kidney injury) ([facility] [address] )  Assessment & Plan  · Renal function appears to have stabilized around creatinine of 1 5-1 7  · Patient intermittently refusing labs, trend as able  · Possibly new baseline per Nephrology  · Creatinine peaked to 2 0 before trending down to 1 8      H/O bariatric surgery - bypass  Assessment & Plan  · History of Savage-en-Y gastric bypass  Has had significant issues over the last 6 months or so with chronic malnutrition and an anastomotic ulcerations  When last seen by bariatric surgery, it was recommended to continue on prolonged TPN for nutritional optimization with possible revision of the Savage-en-Y in the future  · Given her recurrence PICC line infections and bacteremia, TPN has been discontinued  · Continue tube feeds for nutritional optimization  Patient has been tolerating tube feeds without any difficulty  Is also continued on pleasure feeds  · Will need significant improvement before consideration can be given to further surgical intervention      Abnormal CT scan  Assessment & Plan  · Noted on CT C/A/P 9/26  - Focal thickening of the lower portion of the endometrium  While this is not expected in a patient of this age, this appears stable from 2016 suggesting a benign etiology  If there is clinical concern, follow-up ultrasound is recommended  - Small left upper lobe nodules, one of which is new from March 2022  Given the history of smoking, a follow-up chest CT is recommended in 12 months      · This was d/w sister and brother POA over phone by prior provider 9/28    Hyponatremia  Assessment & Plan  · Nephrology following  ·  Hyponatremia the setting of poor solute intake with gastric bypass, continue with salt tablets 2 g t i d , minimize free water flushes with tube feeds  · Sodium appears stable   · Continue to monitor     Hypothyroidism  Assessment & Plan  · TSH elevated and fT4 low  · Endocrinology consulted - levothyroxine timing was adjusted to 2pm per Endocrinology recs (4 hours after tube feeds have stopped running)  · Repeat TFTs in 1 week per Endocrine - TSH checked and elevated at 27 improved from 67 T4 now normal; message sent to endocrine for further rec  * Sepsis ([facility] [address] )  Assessment & Plan  · Initially met criteria with fever, tachypnea, tachycardia, and leukocytosis on admission  Sepsis secondary to recurrent bacteremia and PICC line infections  · PICC line has been removed and patient had PEG tube placed for nutritional support  · Finished full course of ertapenem for ESBL UTI  · Patient with recurrent fever 10/7/22- Started empiric vancomycin whic was dced  · Discussed with infectious disease  observe off of antibiotic , cultures- sent on 10/9/22- negative              VTE Pharmacologic Prophylaxis: VTE Score: 3 Moderate Risk (Score 3-4) - Pharmacological DVT Prophylaxis Ordered: heparin  Patient Centered Rounds: I performed bedside rounds with nursing staff today  Discussions with Specialists or Other Care Team Provider:     Education and Discussions with Family / Patient: patient  Time Spent for Care: 30 minutes  More than 50% of total time spent on counseling and coordination of care as described above      Current Length of Stay: 62 day(s)  Current Patient Status: Inpatient   Certification Statement: The patient will continue to require additional inpatient hospital stay due to pendign dispo  Discharge Plan: stable    Code Status: Level 3 - DNAR and DNI    Subjective:   Pt was clam, per nurses her mood keeps changing    Objective: Vitals:   Temp (24hrs), Av 6 °F (36 4 °C), Min:97 2 °F (36 2 °C), Max:98 °F (36 7 °C)    Temp:  [97 2 °F (36 2 °C)-98 °F (36 7 °C)] 98 °F (36 7 °C)  HR:  [88-98] 88  Resp:  [18] 18  BP: (129-163)/(61-76) 137/67  SpO2:  [90 %-95 %] 95 %  Body mass index is 18 71 kg/m²  Input and Output Summary (last 24 hours): Intake/Output Summary (Last 24 hours) at 10/27/2022 2030  Last data filed at 10/27/2022 1220  Gross per 24 hour   Intake 3240 ml   Output 768 ml   Net 2472 ml       Physical Exam:   Physical Exam  Vitals reviewed  HENT:      Head: Normocephalic and atraumatic  Mouth/Throat:      Mouth: Mucous membranes are moist    Cardiovascular:      Rate and Rhythm: Normal rate and regular rhythm  Heart sounds: Normal heart sounds  Pulmonary:      Effort: Pulmonary effort is normal  No respiratory distress  Breath sounds: Normal breath sounds  Abdominal:      General: There is no distension  Palpations: Abdomen is soft  Tenderness: There is no abdominal tenderness  Musculoskeletal:      Right lower leg: No edema  Left lower leg: No edema  Neurological:      Mental Status: She is alert            Additional Data:     Labs:  Results from last 7 days   Lab Units 10/25/22  0613   WBC Thousand/uL 12 28*   HEMOGLOBIN g/dL 7 1*   HEMATOCRIT % 22 8*   PLATELETS Thousands/uL 501*   NEUTROS PCT % 59   LYMPHS PCT % 17   MONOS PCT % 7   EOS PCT % 15*     Results from last 7 days   Lab Units 10/26/22  0618   SODIUM mmol/L 135   POTASSIUM mmol/L 3 9   CHLORIDE mmol/L 106   CO2 mmol/L 23   BUN mg/dL 41*   CREATININE mg/dL 1 82*   ANION GAP mmol/L 6   CALCIUM mg/dL 9 7   ALBUMIN g/dL 2 0*   TOTAL BILIRUBIN mg/dL 0 20   ALK PHOS U/L 92   ALT U/L 15   AST U/L 20   GLUCOSE RANDOM mg/dL 117         Results from last 7 days   Lab Units 10/27/22  1839 10/27/22  0630 10/26/22  1830 10/26/22  6817 10/26/22  0035 10/25/22  1127 10/25/22  0612 10/24/22  1802 10/24/22  1155 10/24/22  0732 10/24/22  0627 10/24/22  0036   POC GLUCOSE mg/dl 73 101 103 128 113 105 122 90 99 101 127 122               Lines/Drains:  Invasive Devices  Report    Peripheral Intravenous Line  Duration           Peripheral IV 10/26/22 Right Wrist 1 day          Drain  Duration           Gastrostomy/Enterostomy Percutaneous Endoscopic Gastrostomy (PEG) 20 Fr  LUQ 49 days                      Imaging: No pertinent imaging reviewed  Recent Cultures (last 7 days):         Last 24 Hours Medication List:   Current Facility-Administered Medications   Medication Dose Route Frequency Provider Last Rate   • acetaminophen  975 mg Oral Q6H PRN Nina Fernandez PA-C     • ALPRAZolam  0 25 mg Oral TID PRN Ferna Push, DO     • bisacodyl  10 mg Rectal Daily PRN Kriss Galindo MD     • clonazePAM  3 mg Oral HS Jovita Mcdermott, DO     • folic acid  1 mg Oral Daily Jovita Mcdermott, DO     • guaiFENesin  600 mg Oral Q12H [facility] Nina Fernandez PA-C     • heparin (porcine)  5,000 Units Subcutaneous [facility] Ferna Push, DO     • levothyroxine  125 mcg Oral Q24H [place] Bonita Bateman MD     • metoclopramide  10 mg Intravenous Q6H PRN Kriss Galindo MD     • midodrine  10 mg Oral TID AC Haley Rab Ira Vazquez MD     • ondansetron  4 mg Intravenous Q6H PRN Kp Sims DO     • oxyCODONE  5 mg Oral Q6H Kriss Galindo MD     • pantoprazole  40 mg Oral Early Morning Dada Jo MD     • polyethylene glycol  17 g Per PEG Tube BID Kp Sims DO     • senna  2 tablet Oral HS Reymundo Multani MD     • sodium chloride  2 g Oral TID Nataliauarirobbie Borden, DO     • thiamine  100 mg Oral Daily Jovita Baldemar, DO     • traZODone  150 mg Oral HS Kp Sims DO          Today, Patient Was Seen By: Allison Gilmore    **Please Note: This note may have been constructed using a voice recognition system  **

## 2022-10-28 NOTE — PROGRESS NOTES
[address] [facility]  Progress Note - Magi Rockwell 1945, 76 y o  female MRN: [id number]  Unit/Bed#: [facility] 323-01 Encounter: [id number]  Primary Care Provider: Carmen Villegas MD   Date and time admitted to hospital: 8/30/2022 11:49 PM    Hypercalcemia  Assessment & Plan  · Noted on labs 10/24  · PTH intact appropriately decreased  · Nephrology following  · Hold all vitamin D and calcium supplements for now  · Appreciate nephrology recs  · Lack of mobility could by playing a part    Hyperkalemia  Assessment & Plan    · Nephrology following - giving calcium gluconate, D50/insulin, sodium bicarb given October 5th  · Low-potassium pleasure feed diet  · Change tube feeds to Nephro  · k normal now    Urinary retention  Assessment & Plan  · Has required intermittent catheterizations however now voiding spontaneously  · Continue retention protocol    Hypotension  Assessment & Plan  · Continue midodrine 10 mg t i d     Acute encephalopathy  Assessment & Plan  · Patient has waxing waning mental status   · Cognition and mental status issues multifactorial in nature stemming from prolonged illness, numerous hospitalizations, chronic malnutrition, and chronic/recurrent infections  · Monitor clinically  · Delirium precautions  · Neuropsych consulted for capacity eval-patient does not have capacity per neuropsych  · Given history of bipolar disorder, psychiatric consult requested to ensure this has been optimized - his saw patient on 10/19, no changes necessary at this time  · Paranoid about seeing a large group of people in her room who came in through the window, otherwise stable mental status currently mentation is stable continue to monitor   · Rapid response was called on 10/27 evening as she was found unresponsive by the nurse, but she was back to her baseline awake and alert by the time every but he reached    She has not had any such concerns again      Acute on chronic anemia  Assessment & Plan  · 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  · S/p recent EGD in 7/22 revealing: Residual marginal ulcer of the gastrojejunostomy anastomosis with improved size  · S/p EGD 9/2 revealing: Large, cratered ulcer in the gastrojejunal anastomosis   · Status post 1 unit PRBCs this admission  · Per previous provider d/w nephrology, requested to switch from PPI to pepcid given c/f eosinophilia, will need to hold off on this given EGD findings with ulcer for now      Bacteremia  Assessment & Plan  · Recent hx of staph epi bacteremia in 7/2022, presumed due to picc line  · Recurrence 1/2 set staph epi, 2nd set without growth  Was treated with intravenous vancomycin for presumed line infection  · S/p TAVIA (9/9) no evidence of vegetation  · Blood cultures had been negative however repeated again due to isolated fever which are negative x 5 days   · PICC and TPN discontinued, now has PEG tube  · Monitor off abx-monitoring for fevers, up trending leukocytosis  · Leukocytosis stable    Moderate protein-calorie malnutrition ([facility] [address] )  Assessment & Plan  Malnutrition Findings:   Adult Malnutrition type: Chronic illness  Adult Degree of Malnutrition: Other severe protein calorie malnutrition  Malnutrition Characteristics: Weight loss, Muscle loss       360 Statement: Severe/Chronic malnutrition r/t condition, as evidenced by 4 8% wt loss x < 1 week (9/2/22: 125#, 9/5/22: 119#), and severe muscle mass depletion (temples/clavicle)  Treated with liberalized diet and nutrition supplements, possibly nutrition support pending goals of care    BMI Findings: Body mass index is 18 71 kg/m²     · Continue tube feeds    Fall  Assessment & Plan  · 10/11/22 afternoon and rapid response was called for fall  -as per RR note and per patient, she was leaning forward , getting gou tof the bed  to grab a pillow, her leg got caught  and she fell  -denies head strike, loss of consciousness, pain, any lightheadaness, diaphoresis before or after fall  -patient moving all extremities after  Fall  - vital signs stable  -patient was placed on soft C-collar and CT head as well as cervical spine was obtained stat-result showed no fracture or subluxation  -fall precaution  -d/w brother Yuni Staples and updated her status/incident of fall   Also discussed regardign [facility]    Diarrhea  Assessment & Plan  · Recent history of C diff colitis  · Treated with prophylactic dose of p o  Vancomycin while on antibiotics completed course     Ambulatory dysfunction  Assessment & Plan  · Will need rehab on discharge  · Case management for dispo planning       NOAH (acute kidney injury) ([facility] [address] )  Assessment & Plan  · Renal function appears to have stabilized around creatinine of 1 5-1 7  · Patient intermittently refusing labs, trend as able  · Possibly new baseline per Nephrology  · Creatinine peaked to 2 0 before trending down      H/O bariatric surgery - bypass  Assessment & Plan  · History of Savage-en-Y gastric bypass  Has had significant issues over the last 6 months or so with chronic malnutrition and an anastomotic ulcerations  When last seen by bariatric surgery, it was recommended to continue on prolonged TPN for nutritional optimization with possible revision of the Savage-en-Y in the future  · Given her recurrence PICC line infections and bacteremia, TPN has been discontinued  · Continue tube feeds for nutritional optimization  Patient has been tolerating tube feeds without any difficulty  Is also continued on pleasure feeds  · Will need significant improvement before consideration can be given to further surgical intervention      Abnormal CT scan  Assessment & Plan  · Noted on CT C/A/P 9/26  - Focal thickening of the lower portion of the endometrium  While this is not expected in a patient of this age, this appears stable from 2016 suggesting a benign etiology  If there is clinical concern, follow-up ultrasound is recommended    - Small left upper lobe nodules, one of which is new from March 2022  Given the history of smoking, a follow-up chest CT is recommended in 12 months  · This was d/w sister and brother POA over phone by prior provider 9/28    Hyponatremia  Assessment & Plan  · Nephrology following  ·  Hyponatremia the setting of poor solute intake with gastric bypass, continue with salt tablets 2 g t i d , minimize free water flushes with tube feeds  · Sodium appears stable   · Continue to monitor     Hypothyroidism  Assessment & Plan  · TSH elevated and fT4 low  · Endocrinology consulted - levothyroxine timing was adjusted to 2pm per Endocrinology recs (4 hours after tube feeds have stopped running)  · Repeat TFTs in 1 week per Endocrine - TSH checked and elevated at 27 improved from 67 T4 now normal    * Sepsis ([address] )  Assessment & Plan  · Initially met criteria with fever, tachypnea, tachycardia, and leukocytosis on admission  Sepsis secondary to recurrent bacteremia and PICC line infections  · PICC line has been removed and patient had PEG tube placed for nutritional support  · Finished full course of ertapenem for ESBL UTI  · Patient with recurrent fever 10/7/22- Started empiric vancomycin whic was dced  · Discussed with infectious disease  observe off of antibiotic , cultures- sent on 10/9/22- negative                VTE Pharmacologic Prophylaxis: VTE Score: 3 Moderate Risk (Score 3-4) - Pharmacological DVT Prophylaxis Ordered: heparin  Patient Centered Rounds: I performed bedside rounds with nursing staff today  Discussions with Specialists or Other Care Team Provider:     Education and Discussions with Family / Patient: patient  Time Spent for Care: 30 minutes  More than 50% of total time spent on counseling and coordination of care as described above      Current Length of Stay: 58 day(s)  Current Patient Status: Inpatient   Certification Statement: The patient will continue to require additional inpatient hospital stay due to pending dispo  Discharge Plan: stable for DC    Code Status: Level 3 - DNAR and DNI    Subjective:   No complains, some abdo discomfort , toelrating TF    Objective:     Vitals:   Temp (24hrs), Av 1 °F (36 7 °C), Min:98 1 °F (36 7 °C), Max:98 1 °F (36 7 °C)    Temp:  [98 1 °F (36 7 °C)] 98 1 °F (36 7 °C)  HR:  [82-90] 82  Resp:  [18-20] 19  BP: (131-145)/(65-71) 131/65  SpO2:  [92 %-95 %] 92 %  Body mass index is 18 71 kg/m²  Input and Output Summary (last 24 hours):   No intake or output data in the 24 hours ending 10/28/22 1806    Physical Exam:   Physical Exam  Vitals reviewed  HENT:      Head: Normocephalic and atraumatic  Mouth/Throat:      Mouth: Mucous membranes are moist    Cardiovascular:      Rate and Rhythm: Normal rate and regular rhythm  Heart sounds: Normal heart sounds  Pulmonary:      Effort: Pulmonary effort is normal  No respiratory distress  Breath sounds: Normal breath sounds  Abdominal:      General: There is no distension  Palpations: Abdomen is soft  Tenderness: There is no abdominal tenderness  Musculoskeletal:      Right lower leg: No edema  Left lower leg: No edema  Neurological:      Mental Status: She is alert            Additional Data:     Labs:  Results from last 7 days   Lab Units 10/28/22  1317 10/25/22  0613   WBC Thousand/uL 12 85* 12 28*   HEMOGLOBIN g/dL 7 8* 7 1*   HEMATOCRIT % 24 7* 22 8*   PLATELETS Thousands/uL 532* 501*   NEUTROS PCT %  --  59   LYMPHS PCT %  --  17   MONOS PCT %  --  7   EOS PCT %  --  15*     Results from last 7 days   Lab Units 10/28/22  1317   SODIUM mmol/L 134*   POTASSIUM mmol/L 4 0   CHLORIDE mmol/L 104   CO2 mmol/L 24   BUN mg/dL 40*   CREATININE mg/dL 1 62*   ANION GAP mmol/L 6   CALCIUM mg/dL 10 0   ALBUMIN g/dL 2 3*   TOTAL BILIRUBIN mg/dL 0 38   ALK PHOS U/L 97   ALT U/L 17   AST U/L 22   GLUCOSE RANDOM mg/dL 90         Results from last 7 days   Lab Units 10/28/22  4098 10/27/22  2349 10/27/22  1839 10/27/22  0630 10/26/22  1830 10/26/22  0658 10/26/22  0035 10/25/22  1127 10/25/22  0612 10/24/22  1802 10/24/22  1155 10/24/22  0732   POC GLUCOSE mg/dl 85 90 73 101 103 128 113 105 122 90 99 101               Lines/Drains:  Invasive Devices  Report    Peripheral Intravenous Line  Duration           Peripheral IV 10/26/22 Right Wrist 2 days          Drain  Duration           Gastrostomy/Enterostomy Percutaneous Endoscopic Gastrostomy (PEG) 20 Fr  LUQ 50 days                      Imaging: No pertinent imaging reviewed      Recent Cultures (last 7 days):         Last 24 Hours Medication List:   Current Facility-Administered Medications   Medication Dose Route Frequency Provider Last Rate   • acetaminophen  975 mg Oral Q6H PRN Pipo Clayton PA-C     • ALPRAZolam  0 25 mg Oral TID PRN Timmy Garvin, DO     • bisacodyl  10 mg Rectal Daily PRN Adina Barrientos MD     • clonazePAM  3 mg Oral HS Jovita Mcdermott, DO     • folic acid  1 mg Oral Daily Jovitaaixa Mcdermott, DO     • guaiFENesin  600 mg Oral Q12H [facility] Pipo Clayton PA-C     • heparin (porcine)  5,000 Units Subcutaneous Q8H [facility] Kaylan Arroyo DO     • levalbuterol  0 63 mg Nebulization Q6H PRN Donna Gaitan MD     • levothyroxine  125 mcg Oral Q24H [place] Mily Ramires MD     • metoclopramide  10 mg Intravenous Q6H PRN Adina Barrientos MD     • midodrine  10 mg Oral TID AC Haley Copeland MD     • ondansetron  4 mg Intravenous Q6H PRN Fabian Colón DO     • oxyCODONE  5 mg Oral Q6H Adina Barrientos MD     • pantoprazole  40 mg Oral Early Morning Nicolette Alfaro MD     • polyethylene glycol  17 g Per PEG Tube BID Fabian Colón DO     • senna  2 tablet Oral HS Hector Rao MD     • sodium chloride  2 g Oral TID Brianna Desai, DO     • thiamine  100 mg Oral Daily Jovitaaixa Mcdermott, DO     • traZODone  150 mg Oral HS Fabian Colón DO          Today, Patient Was Seen By: Donna Gaitan    **Please Note: This note may have been constructed using a voice recognition system  **

## 2022-10-28 NOTE — ASSESSMENT & PLAN NOTE
· 10/11/22 afternoon and rapid response was called for fall  -as per RR note and per patient, she was leaning forward , getting gou tof the bed  to grab a pillow, her leg got caught  and she fell  -denies head strike, loss of consciousness, pain, any lightheadaness, diaphoresis before or after fall  -patient moving all extremities after  Fall  - vital signs stable  -patient was placed on soft C-collar and CT head as well as cervical spine was obtained stat-result showed no fracture or subluxation  -fall precaution  -d/w brother Mary Loushantal Jonesdev and updated her status/incident of fall   [facility]

## 2022-10-28 NOTE — ASSESSMENT & PLAN NOTE
· TSH elevated and fT4 low  · Endocrinology consulted - levothyroxine timing was adjusted to 2pm per Endocrinology recs (4 hours after tube feeds have stopped running)  · Repeat TFTs in 1 week per Endocrine - TSH checked and elevated at 27 improved from 67 T4 now normal

## 2022-10-28 NOTE — ASSESSMENT & PLAN NOTE
· 10/11/22 afternoon and rapid response was called for fall  -as per RR note and per patient, she was leaning forward , getting gou tof the bed  to grab a pillow, her leg got caught  and she fell  -denies head strike, loss of consciousness, pain, any lightheadaness, diaphoresis before or after fall  -patient moving all extremities after  Fall  - vital signs stable  -patient was placed on soft C-collar and CT head as well as cervical spine was obtained stat-result showed no fracture or subluxation  -fall precaution  -d/w brother Bethany Néstor and updated her status/incident of fall   Also discussed regardign [facility]

## 2022-10-29 NOTE — PROGRESS NOTES
[facility]  Progress Note - Bernabe Linda 1945, 76 y o  female MRN: [id number]  Unit/Bed#: [facility] 323-01 Encounter: [id number]  Primary Care Provider: Soto Hdz MD   Date and time admitted to hospital: 8/30/2022 11:49 PM    Hypercalcemia  Assessment & Plan  · Noted on labs 10/24  · PTH intact appropriately decreased  · Nephrology following  · Hold all vitamin D and calcium supplements for now  · Appreciate nephrology recs  · Lack of mobility could by playing a part    Hyperkalemia  Assessment & Plan    · Nephrology following - giving calcium gluconate, D50/insulin, sodium bicarb given October 5th  · Low-potassium pleasure feed diet  · Change tube feeds to Nephro  · k normal now    Urinary retention  Assessment & Plan  · Has required intermittent catheterizations however now voiding spontaneously  · Continue retention protocol    Hypotension  Assessment & Plan  · Continue midodrine 10 mg t i d     Acute encephalopathy  Assessment & Plan  · Patient has waxing waning mental status   · Cognition and mental status issues multifactorial in nature stemming from prolonged illness, numerous hospitalizations, chronic malnutrition, and chronic/recurrent infections  · Monitor clinically  · Delirium precautions  · Neuropsych consulted for capacity eval-patient does not have capacity per neuropsych  · Given history of bipolar disorder, psychiatric consult requested to ensure this has been optimized - his saw patient on 10/19, no changes necessary at this time  · Paranoid about seeing a large group of people in her room who came in through the window, otherwise stable mental status currently mentation is stable continue to monitor   · Rapid response was called on 10/27 evening as she was found unresponsive by the nurse, but she was back to her baseline awake and alert by the time every but he reached    She has not had any such concerns again      Acute on chronic anemia  Assessment & Plan  · 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  · S/p recent EGD in 7/22 revealing: Residual marginal ulcer of the gastrojejunostomy anastomosis with improved size  · S/p EGD 9/2 revealing: Large, cratered ulcer in the gastrojejunal anastomosis   · Status post 1 unit PRBCs this admission  · Per previous provider d/w nephrology, requested to switch from PPI to pepcid given c/f eosinophilia, will need to hold off on this given EGD findings with ulcer for now      Bacteremia  Assessment & Plan  · Recent hx of staph epi bacteremia in 7/2022, presumed due to picc line  · Recurrence 1/2 set staph epi, 2nd set without growth  Was treated with intravenous vancomycin for presumed line infection  · S/p TAVIA (9/9) no evidence of vegetation  · Blood cultures had been negative however repeated again due to isolated fever which are negative x 5 days   · PICC and TPN discontinued, now has PEG tube  · Monitor off abx-monitoring for fevers, up trending leukocytosis  · Leukocytosis stable    Moderate protein-calorie malnutrition ([facility] [address] )  Assessment & Plan  Malnutrition Findings:   Adult Malnutrition type: Chronic illness  Adult Degree of Malnutrition: Other severe protein calorie malnutrition  Malnutrition Characteristics: Weight loss, Muscle loss       360 Statement: Severe/Chronic malnutrition r/t condition, as evidenced by 4 8% wt loss x < 1 week (9/2/22: 125#, 9/5/22: 119#), and severe muscle mass depletion (temples/clavicle)  Treated with liberalized diet and nutrition supplements, possibly nutrition support pending goals of care    BMI Findings: Body mass index is 18 71 kg/m²     · Continue tube feeds    Fall  Assessment & Plan  · 10/11/22 afternoon and rapid response was called for fall  -as per RR note and per patient, she was leaning forward , getting gou tof the bed  to grab a pillow, her leg got caught  and she fell  -denies head strike, loss of consciousness, pain, any lightheadaness, diaphoresis before or after fall  -patient moving all extremities after  Fall  - vital signs stable  -patient was placed on soft C-collar and CT head as well as cervical spine was obtained stat-result showed no fracture or subluxation  -fall precaution  -d/w brother Lucho Langston and updated her status/incident of fall   Also discussed regardign [facility]    Diarrhea  Assessment & Plan  · Recent history of C diff colitis  · Treated with prophylactic dose of p o  Vancomycin while on antibiotics completed course     Ambulatory dysfunction  Assessment & Plan  · Will need rehab on discharge  · Case management for dispo planning       NOAH (acute kidney injury) ([facility] [address] )  Assessment & Plan  · Renal function appears to have stabilized around creatinine of 1 5-1 7  · Patient intermittently refusing labs, trend as able  · Possibly new baseline per Nephrology  · Creatinine peaked to 2 0 before trending down      H/O bariatric surgery - bypass  Assessment & Plan  · History of Savage-en-Y gastric bypass  Has had significant issues over the last 6 months or so with chronic malnutrition and an anastomotic ulcerations  When last seen by bariatric surgery, it was recommended to continue on prolonged TPN for nutritional optimization with possible revision of the Savage-en-Y in the future  · Given her recurrence PICC line infections and bacteremia, TPN has been discontinued  · Continue tube feeds for nutritional optimization  Patient has been tolerating tube feeds without any difficulty  Is also continued on pleasure feeds  · Will need significant improvement before consideration can be given to further surgical intervention      Abnormal CT scan  Assessment & Plan  · Noted on CT C/A/P 9/26  - Focal thickening of the lower portion of the endometrium  While this is not expected in a patient of this age, this appears stable from 2016 suggesting a benign etiology  If there is clinical concern, follow-up ultrasound is recommended    - Small left upper lobe nodules, one of which is new from March 2022  Given the history of smoking, a follow-up chest CT is recommended in 12 months  · This was d/w sister and brother POA over phone by prior provider 9/28    Hyponatremia  Assessment & Plan  · Nephrology following  ·  Hyponatremia the setting of poor solute intake with gastric bypass, continue with salt tablets 2 g t i d , minimize free water flushes with tube feeds  · Sodium appears stable   · Continue to monitor     Hypothyroidism  Assessment & Plan  · TSH elevated and fT4 low  · Endocrinology consulted - levothyroxine timing was adjusted to 2pm per Endocrinology recs (4 hours after tube feeds have stopped running)  · Repeat TFTs in 1 week per Endocrine - TSH checked and elevated at 27 improved from 67 T4 now normal    * Sepsis ([address] )  Assessment & Plan  · Initially met criteria with fever, tachypnea, tachycardia, and leukocytosis on admission  Sepsis secondary to recurrent bacteremia and PICC line infections  · PICC line has been removed and patient had PEG tube placed for nutritional support  · Finished full course of ertapenem for ESBL UTI  · Patient with recurrent fever 10/7/22- Started empiric vancomycin whic was dced  · Discussed with infectious disease  observe off of antibiotic , cultures- sent on 10/9/22- negative              VTE Pharmacologic Prophylaxis: VTE Score: 3 Moderate Risk (Score 3-4) - Pharmacological DVT Prophylaxis Ordered: heparin  Patient Centered Rounds: I performed bedside rounds with nursing staff today  Discussions with Specialists or Other Care Team Provider:     Education and Discussions with Family / Patient: patient  Time Spent for Care: 30 minutes  More than 50% of total time spent on counseling and coordination of care as described above      Current Length of Stay: 61 day(s)  Current Patient Status: Inpatient   Certification Statement: The patient will continue to require additional inpatient hospital stay due to pending dispo  Discharge Plan: stable for DC    Code Status: Level 3 - DNAR and DNI    Subjective:   C/o waking up soaking in sweat today    Objective:     Vitals:   Temp (24hrs), Av 5 °F (36 9 °C), Min:97 5 °F (36 4 °C), Max:99 9 °F (37 7 °C)    Temp:  [97 5 °F (36 4 °C)-99 9 °F (37 7 °C)] 99 9 °F (37 7 °C)  HR:  [90-94] 94  Resp:  [18-19] 18  BP: (125-160)/(60-74) 125/60  SpO2:  [93 %-95 %] 93 %  Body mass index is 18 71 kg/m²  Input and Output Summary (last 24 hours): Intake/Output Summary (Last 24 hours) at 10/29/2022 1603  Last data filed at 10/29/2022 0900  Gross per 24 hour   Intake 375 ml   Output 0 ml   Net 375 ml       Physical Exam:   Physical Exam  Vitals reviewed  HENT:      Head: Normocephalic and atraumatic  Mouth/Throat:      Mouth: Mucous membranes are moist    Cardiovascular:      Rate and Rhythm: Normal rate and regular rhythm  Heart sounds: Normal heart sounds  Pulmonary:      Effort: Pulmonary effort is normal  No respiratory distress  Breath sounds: Normal breath sounds  No wheezing  Abdominal:      General: There is no distension  Palpations: Abdomen is soft  Tenderness: There is no abdominal tenderness  Musculoskeletal:      Right lower leg: No edema  Left lower leg: No edema            Additional Data:     Labs:  Results from last 7 days   Lab Units 10/28/22  1317 10/25/22  0613   WBC Thousand/uL 12 85* 12 28*   HEMOGLOBIN g/dL 7 8* 7 1*   HEMATOCRIT % 24 7* 22 8*   PLATELETS Thousands/uL 532* 501*   NEUTROS PCT %  --  59   LYMPHS PCT %  --  17   MONOS PCT %  --  7   EOS PCT %  --  15*     Results from last 7 days   Lab Units 10/28/22  1317   SODIUM mmol/L 134*   POTASSIUM mmol/L 4 0   CHLORIDE mmol/L 104   CO2 mmol/L 24   BUN mg/dL 40*   CREATININE mg/dL 1 62*   ANION GAP mmol/L 6   CALCIUM mg/dL 10 0   ALBUMIN g/dL 2 3*   TOTAL BILIRUBIN mg/dL 0 38   ALK PHOS U/L 97   ALT U/L 17   AST U/L 22   GLUCOSE RANDOM mg/dL 90 Results from last 7 days   Lab Units 10/29/22  1112 10/29/22  0045 10/28/22  1648 10/27/22  2349 10/27/22  1839 10/27/22  0630 10/26/22  1830 10/26/22  0658 10/26/22  0035 10/25/22  1127 10/25/22  0612 10/24/22  1802   POC GLUCOSE mg/dl 109 100 85 90 73 101 103 128 113 105 122 90               Lines/Drains:  Invasive Devices  Report    Peripheral Intravenous Line  Duration           Peripheral IV 10/26/22 Right Wrist 3 days          Drain  Duration           Gastrostomy/Enterostomy Percutaneous Endoscopic Gastrostomy (PEG) 20 Fr  LUQ 50 days    External Urinary Catheter <1 day                      Imaging: No pertinent imaging reviewed      Recent Cultures (last 7 days):         Last 24 Hours Medication List:   Current Facility-Administered Medications   Medication Dose Route Frequency Provider Last Rate   • acetaminophen  975 mg Oral Q6H PRN Connor Wan PA-C     • ALPRAZolam  0 25 mg Oral TID PRN Blaise Jiménez DO     • bisacodyl  10 mg Rectal Daily PRN Norberto Gunn MD     • clonazePAM  3 mg Oral HS Jovita Mcdermott, DO     • folic acid  1 mg Oral Daily Jovitakash Mcdermott, DO     • guaiFENesin  600 mg Oral Q12H [facility] Connor Wan PA-C     • heparin (porcine)  5,000 Units Subcutaneous [facility] Blaise Jiménez, DO     • levothyroxine  125 mcg Oral Q24H [place] Pita Pedraza MD     • metoclopramide  10 mg Intravenous Q6H PRN Norberto Gunn MD     • midodrine  10 mg Oral TID AC Haley Copeland MD     • ondansetron  4 mg Intravenous Q6H PRN Renetta Grout, DO     • oxyCODONE  5 mg Oral Q6H Norberto Gunn MD     • pantoprazole  40 mg Oral Early Morning Fabby Santos MD     • polyethylene glycol  17 g Per PEG Tube BID Renetta Grout, DO     • senna  2 tablet Oral HS [place]ico Ghotra MD     • sodium chloride  2 g Oral TID Nikko Shoemaker, DO     • thiamine  100 mg Oral Daily Jovita Mcdermott, DO     • traZODone  150 mg Oral HS Renetta Grout, DO          Today, Patient Was Seen By: Scott Christina Jacob    **Please Note: This note may have been constructed using a voice recognition system  **

## 2022-10-29 NOTE — RESPIRATORY THERAPY NOTE
RT Protocol Note  Lucho Hartman 76 y o  female MRN: [id number]  Unit/Bed#: [facility] 323-01 Encounter: [id number]    Assessment    Principal Problem:    Sepsis ([facility] )  Active Problems:    Hypothyroidism    Hyponatremia    Abnormal CT scan    H/O bariatric surgery - bypass    NOAH (acute kidney injury) ([facility] )    Ambulatory dysfunction    Diarrhea    Fall    Moderate protein-calorie malnutrition ([facility] )    Bacteremia    Acute on chronic anemia    Goals of care, counseling/discussion    Acute encephalopathy    Hypotension    Urinary retention    Hyperkalemia    Hypercalcemia      Home Pulmonary Medications:  na       Past Medical History:   Diagnosis Date    Anemia     Anxiety     Bipolar disorder (HCC)     Colon polyp     Disease of thyroid gland     Essential hypertension     Essential tremor     Fibromyalgia, primary     GERD (gastroesophageal reflux disease)     Inflammatory polyarthropathy (HCC)     Mammogram abnormal     Pressure injury of skin      Social History     Socioeconomic History    Marital status: Single     Spouse name: None    Number of children: None    Years of education: None    Highest education level: None   Occupational History    None   Tobacco Use    Smoking status: Former Smoker     Quit date:      Years since quittin 8    Smokeless tobacco: Never Used   Vaping Use    Vaping Use: Never used   Substance and Sexual Activity    Alcohol use:  Yes     Alcohol/week: 4 0 standard drinks     Types: 4 Glasses of wine per week     Comment: rare    Drug use: Never    Sexual activity: Not Currently   Other Topics Concern    None   Social History Narrative    None     Social Determinants of Health     Financial Resource Strain: Not on file   Food Insecurity: No Food Insecurity    Worried About Running Out of Food in the Last Year: Never true    Ran Out of Food in the Last Year: Never true   Transportation Needs: No Transportation Needs    Lack of Transportation (Medical): No    Lack of Transportation (Non-Medical): No   Physical Activity: Not on file   Stress: Not on file   Social Connections: Not on file   Intimate Partner Violence: Not on file   Housing Stability: Low Risk     Unable to Pay for Housing in the Last Year: No    Number of Places Lived in the Last Year: 2    Unstable Housing in the Last Year: No       Subjective         Objective    Physical Exam:   Assessment Type: Assess only  General Appearance: Alert, Awake  Respiratory Pattern: Normal  Chest Assessment: Chest expansion symmetrical  Bilateral Breath Sounds: Clear, Diminished  Cough: None    Vitals:  Blood pressure 160/74, pulse 90, temperature 98 2 °F (36 8 °C), temperature source Oral, resp  rate 18, height 5' 4" (1 626 m), weight 49 4 kg (109 lb), SpO2 95 %  Imaging and other studies: I have personally reviewed pertinent reports              Plan    Respiratory Plan: Mild Distress pathway        Resp Comments: (P) no resp hx, no resp meds at home, no available cxr, d/c'd prn udn tx and resp protocol

## 2022-10-29 NOTE — TREATMENT PLAN
Labs from 10/28/22 reviewed   Stable  Nephrology will see back in full consultation on 10/31/22   pls call with questions in the interim

## 2022-10-29 NOTE — ASSESSMENT & PLAN NOTE
· 10/11/22 afternoon and rapid response was called for fall  -as per RR note and per patient, she was leaning forward , getting gou tof the bed  to grab a pillow, her leg got caught  and she fell  -denies head strike, loss of consciousness, pain, any lightheadaness, diaphoresis before or after fall  -patient moving all extremities after  Fall  - vital signs stable  -patient was placed on soft C-collar and CT head as well as cervical spine was obtained stat-result showed no fracture or subluxation  -fall precaution  -d/w brother Mary Loushantal Jonesdev and updated her status/incident of fall   Also [facility]

## 2022-10-29 NOTE — PLAN OF CARE
Problem: Potential for Falls  Goal: Patient will remain free of falls  Description: INTERVENTIONS:  - Educate patient/family on patient safety including physical limitations  - Instruct patient to call for assistance with activity   - Consult OT/PT to assist with strengthening/mobility   - Keep Call bell within reach  - Keep bed low and locked with side rails adjusted as appropriate  - Keep care items and personal belongings within reach  - Initiate and maintain comfort rounds  - Make Fall Risk Sign visible to staff  - Offer Toileting every  Hours, in advance of need  - Initiate/Maintain alarm  - Obtain necessary fall risk management equipment:   - Apply yellow socks and bracelet for high fall risk patients  - Consider moving patient to room near nurses station  Outcome: Progressing     Problem: MOBILITY - ADULT  Goal: Maintain or return to baseline ADL function  Description: INTERVENTIONS:  -  Assess patient's ability to carry out ADLs; assess patient's baseline for ADL function and identify physical deficits which impact ability to perform ADLs (bathing, care of mouth/teeth, toileting, grooming, dressing, etc )  - Assess/evaluate cause of self-care deficits   - Assess range of motion  - Assess patient's mobility; develop plan if impaired  - Assess patient's need for assistive devices and provide as appropriate  - Encourage maximum independence but intervene and supervise when necessary  - Involve family in performance of ADLs  - Assess for home care needs following discharge   - Consider OT consult to assist with ADL evaluation and planning for discharge  - Provide patient education as appropriate  Outcome: Progressing  Goal: Maintains/Returns to pre admission functional level  Description: INTERVENTIONS:  - Perform BMAT or MOVE assessment daily    - Set and communicate daily mobility goal to care team and patient/family/caregiver     - Collaborate with rehabilitation services on mobility goals if consulted  - Perform Range of Motion times a day  - Reposition patient every hours    - Dangle patient times a day  - Stand patient                                                        - Ambulate patient  times a day  - Out of bed to chair  times a day   - Out of bed for meals times a day  - Out of bed for toileting  - Record patient progress and toleration of activity level   Outcome: Progressing     Problem: INFECTION - ADULT  Goal: Absence or prevention of progression during hospitalization  Description: INTERVENTIONS:  - Assess and monitor for signs and symptoms of infection  - Monitor lab/diagnostic results  - Monitor all insertion sites, i e  indwelling lines, tubes, and drains  - Monitor endotracheal if appropriate and nasal secretions for changes in amount and color  - [place] appropriate cooling/warming therapies per order  - Administer medications as ordered  - Instruct and encourage patient and family to use good hand hygiene technique  - Identify and instruct in appropriate isolation precautions for identified infection/condition  Outcome: Progressing  Goal: Absence of fever/infection during neutropenic period  Description: INTERVENTIONS:  - Monitor WBC    Outcome: Progressing     Problem: SAFETY ADULT  Goal: Patient will remain free of falls  Description: INTERVENTIONS:  - Educate patient/family on patient safety including physical limitations  - Instruct patient to call for assistance with activity   - Consult OT/PT to assist with strengthening/mobility   - Keep Call bell within reach  - Keep bed low and locked with side rails adjusted as appropriate  - Keep care items and personal belongings within reach  - Initiate and maintain comfort rounds  - Make Fall Risk Sign visible to staff  - Offer Toileting every Hours, in advance of need  - Initiate/Maintain alarm  - Obtain necessary fall risk management equipment:   - Apply yellow socks and bracelet for high fall risk patients  - Consider moving patient to room near nurses station  Outcome: Progressing  Goal: Maintain or return to baseline ADL function  Description: INTERVENTIONS:  -  Assess patient's ability to carry out ADLs; assess patient's baseline for ADL function and identify physical deficits which impact ability to perform ADLs (bathing, care of mouth/teeth, toileting, grooming, dressing, etc )  - Assess/evaluate cause of self-care deficits   - Assess range of motion  - Assess patient's mobility; develop plan if impaired  - Assess patient's need for assistive devices and provide as appropriate  - Encourage maximum independence but intervene and supervise when necessary  - Involve family in performance of ADLs  - Assess for home care needs following discharge   - Consider OT consult to assist with ADL evaluation and planning for discharge  - Provide patient education as appropriate  Outcome: Progressing  Goal: Maintains/Returns to pre admission functional level  Description: INTERVENTIONS:  - Perform BMAT or MOVE assessment daily    - Set and communicate daily mobility goal to care team and patient/family/caregiver  - Collaborate with rehabilitation services on mobility goals if consulted  - Perform Range of Motion  times a day  - Reposition patient every  hours    - Dangle patient  times a day  - Stand patient  times a day  - Ambulate patient times a day  - Out of bed to chair  times a day   - Out of bed for meals times a day  - Out of bed for toileting  - Record patient progress and toleration of activity level   Outcome: Progressing     Problem: DISCHARGE PLANNING  Goal: Discharge to home or other facility with appropriate resources  Description: INTERVENTIONS:  - Identify barriers to discharge w/patient and caregiver  - Arrange for needed discharge resources and transportation as appropriate  - Identify discharge learning needs (meds, wound care, etc )  - Arrange for interpretive services to assist at discharge as needed  - Refer to Case Management Department for coordinating discharge planning if the patient needs post-hospital services based on physician/advanced practitioner order or complex needs related to functional status, cognitive ability, or social support system  Outcome: Progressing     Problem: Knowledge Deficit  Goal: Patient/family/caregiver demonstrates understanding of disease process, treatment plan, medications, and discharge instructions  Description: Complete learning assessment and assess knowledge base    Interventions:  - Provide teaching at level of understanding  - Provide teaching via preferred learning methods  Outcome: Progressing     Problem: SKIN/TISSUE INTEGRITY - ADULT  Goal: Skin Integrity remains intact(Skin Breakdown Prevention)  Description: Assess:  -Perform Gabe assessment every   -Clean and moisturize skin every   -Inspect skin when repositioning, toileting, and assisting with ADLS  -Assess under medical devices such as  every   -Assess extremities for adequate circulation and sensation     Bed Management:  -Have minimal linens on bed & keep smooth, unwrinkled  -Change linens as needed when moist or perspiring  -Avoid sitting or lying in one position for more than hours while in bed  -Keep HOB at degrees     Toileting:  -Offer bedside commode  -Assess for incontinence every   -Use incontinent care products after each incontinent episode such as     Activity:  -Mobilize patient times a day  -Encourage activity and walks on unit  -Encourage or provide ROM exercises   -Turn and reposition patient every Hours  -Use appropriate equipment to lift or move patient in bed  -Instruct/ Assist with weight shifting every  when out of bed in chair  -Consider limitation of chair time *hour intervals    Skin Care:  -Avoid use of baby powder, tape, friction and shearing, hot water or constrictive clothing  -Relieve pressure over bony prominences using   -Do not massage red bony areas    Next Steps:  -Teach patient strategies to minimize risks such as   -Consider consults to  interdisciplinary teams such as   Outcome: Progressing  Goal: Incision(s), wounds(s) or drain site(s) healing without S/S of infection  Description: INTERVENTIONS  - Assess and document dressing, incision, wound bed, drain sites and surrounding tissue  - Provide patient and family education  - Perform skin care/dressing changes every   Outcome: Progressing  Goal: Pressure injury heals and does not worsen  Description: Interventions:  - Implement low air loss mattress or specialty surface (Criteria met)  - Apply silicone foam dressing  - Instruct/assist with weight shifting every  minutes when in chair   - Limit chair time to hour intervals  - Use special pressure reducing interventions such as when in chair   - Apply fecal or urinary incontinence containment device   - Perform passive or active ROM every   - Turn and reposition patient & offload bony prominences every  hours   - Utilize friction reducing device or surface for transfers   - Consider consults to  interdisciplinary teams such as   - Use incontinent care products after each incontinent episode such as   - Consider nutrition services referral as needed  Outcome: Progressing     Problem: Prexisting or High Potential for Compromised Skin Integrity  Goal: Skin integrity is maintained or improved  Description: INTERVENTIONS:  - Identify patients at risk for skin breakdown  - Assess and monitor skin integrity  - Assess and monitor nutrition and hydration status  - Monitor labs   - Assess for incontinence   - Turn and reposition patient  - Assist with mobility/ambulation  - Relieve pressure over bony prominences  - Avoid friction and shearing  - Provide appropriate hygiene as needed including keeping skin clean and dry  - Evaluate need for skin moisturizer/barrier cream  - Collaborate with interdisciplinary team   - Patient/family teaching  - Consider wound care consult   Outcome: Progressing     Problem: Nutrition/Hydration-ADULT  Goal: Nutrient/Hydration intake appropriate for improving, restoring or maintaining nutritional needs  Description: Monitor and assess patient's nutrition/hydration status for malnutrition  Collaborate with interdisciplinary team and initiate plan and interventions as ordered  Monitor patient's weight and dietary intake as ordered or per policy  Utilize nutrition screening tool and intervene as necessary  Determine patient's food preferences and provide high-protein, high-caloric foods as appropriate       INTERVENTIONS:  - Monitor oral intake, urinary output, labs, and treatment plans  - Assess nutrition and hydration status and recommend course of action  - Evaluate amount of meals eaten  - Assist patient with eating if necessary   - Allow adequate time for meals  - Recommend/ encourage appropriate diets, oral nutritional supplements, and vitamin/mineral supplements  - Order, calculate, and assess calorie counts as needed  - Recommend, monitor, and adjust tube feedings and TPN/PPN based on assessed needs  - Assess need for intravenous fluids  - Provide specific nutrition/hydration education as appropriate  - Include patient/family/caregiver in decisions related to nutrition  Outcome: Progressing     Problem: SAFETY,RESTRAINT: NV/NON-SELF DESTRUCTIVE BEHAVIOR  Goal: Remains free of harm/injury (restraint for non violent/non self-detsructive behavior)  Description: INTERVENTIONS:  - Instruct patient/family regarding restraint use   - Assess and monitor physiologic and psychological status   - Provide interventions and comfort measures to meet assessed patient needs   - Identify and implement measures to help patient regain control  - Assess readiness for release of restraint   Outcome: Progressing  Goal: Returns to optimal restraint-free functioning  Description: INTERVENTIONS:  - Assess the patient's behavior and symptoms that indicate continued need for restraint  - Identify and implement measures to help patient regain control  - Assess readiness for release of restraint   Outcome: Progressing

## 2022-10-30 NOTE — PLAN OF CARE
Problem: MOBILITY - ADULT  Goal: Maintain or return to baseline ADL function  Description: INTERVENTIONS:  -  Assess patient's ability to carry out ADLs; assess patient's baseline for ADL function and identify physical deficits which impact ability to perform ADLs (bathing, care of mouth/teeth, toileting, grooming, dressing, etc )  - Assess/evaluate cause of self-care deficits   - Assess range of motion  - Assess patient's mobility; develop plan if impaired  - Assess patient's need for assistive devices and provide as appropriate  - Encourage maximum independence but intervene and supervise when necessary  - Involve family in performance of ADLs  - Assess for home care needs following discharge   - Consider OT consult to assist with ADL evaluation and planning for discharge  - Provide patient education as appropriate  Outcome: Progressing     Problem: INFECTION - ADULT  Goal: Absence or prevention of progression during hospitalization  Description: INTERVENTIONS:  - Assess and monitor for signs and symptoms of infection  - Monitor lab/diagnostic results  - Monitor all insertion sites, i e  indwelling lines, tubes, and drains  - Monitor endotracheal if appropriate and nasal secretions for changes in amount and color  - [place] appropriate cooling/warming therapies per order  - Administer medications as ordered  - Instruct and encourage patient and family to use good hand hygiene technique  - Identify and instruct in appropriate isolation precautions for identified infection/condition  Outcome: Progressing

## 2022-10-31 NOTE — ASSESSMENT & PLAN NOTE
· 10/11/22 afternoon and rapid response was called for fall  -as per RR note and per patient, she was leaning forward , getting gou tof the bed  to grab a pillow, her leg got caught  and she fell  -denies head strike, loss of consciousness, pain, any lightheadaness, diaphoresis before or after fall  -patient moving all extremities after  Fall  - vital signs stable  -patient was placed on soft C-collar and CT head as well as cervical spine was obtained stat-result showed no fracture or subluxation  -fall precaution  -d/w brother Mohit Misty and updated her status/incident of fall   [facility]

## 2022-10-31 NOTE — PLAN OF CARE
Problem: Potential for Falls  Goal: Patient will remain free of falls  Description: INTERVENTIONS:  - Educate patient/family on patient safety including physical limitations  - Instruct patient to call for assistance with activity   - Consult OT/PT to assist with strengthening/mobility   - Keep Call bell within reach  - Keep bed low and locked with side rails adjusted as appropriate  - Keep care items and personal belongings within reach  - Initiate and maintain comfort rounds  - Make Fall Risk Sign visible to staff  - Offer Toileting every 2 Hours, in advance of need  - Initiate/Maintain bedalarm  - Obtain necessary fall risk management equipment:     - Apply yellow socks and bracelet for high fall risk patients  - Consider moving patient to room near nurses station  Outcome: Progressing

## 2022-10-31 NOTE — WOUND OSTOMY CARE
Progress Note - Wound   Noemy Almanzar 76 y o  female MRN: [id number]  Unit/Bed#: [facility] 323-01 Encounter: [id number]        Assessment:   Patient seen today for wound care follow up visit  Patient is min assist with turning from side to side  Patient has Josias Lion in place for urinary incontinence and is incontinent of bowel  Patient is independent with meals but receives tube feed supplementation for poor nutrition intake  Patient is thin and frail in appearance,             1  POA stage III sacrum- small, oval full thickness wound with 75% pale pink granulation tissue and 25% moist yellow slough, small amount of drainage  Periwound is thick, rolled edges  No induration, fluctuance, odor, warmth/temperature differences, redness, or purulence noted to the above noted wounds and skin areas assessed  New dressings applied per orders listed below  Patient tolerated well- no s/s of non-verbal pain or discomfort observed during the encounter  Bedside nurse aware of plan of care  See flow sheets for more detailed assessment findings  Wound care will continue to follow  Skin care Plan:   1-Cleanse sacro-buttocks with soap and water  Apply Triad paste to wound bed and cover with Allevyn foam  Sami with T for treatment  Change everyday and PRN  2-Turn/reposition q2h or when medically stable for pressure re-distribution on skin   3-Elevate heels to offload pressure   4-Moisturize skin daily with skin nourishing cream   5-Ehob cushion in chair when out of bed     6-Hydraguard to bilateral heels BID and PRN   7-P-500 specialty mattress       Wound 07/26/22 Pressure Injury Sacrum Mid (Active)   Wound Image   10/31/22 1137   Wound Description Granulation tissue;Pale;Pink;Slough 10/31/22 1137   Pressure Injury Stage 3 10/31/22 1137   Karyn-wound Assessment Scar Tissue 10/31/22 1137   Wound Length (cm) 2 cm 10/31/22 1137   Wound Width (cm) 1 cm 10/31/22 1137   Wound Depth (cm) 0 4 cm 10/31/22 1137   Wound Surface Area (cm^2) 2 cm^2 10/31/22 1137   Wound Volume (cm^3) 0 8 cm^3 10/31/22 1137   Calculated Wound Volume (cm^3) 0 8 cm^3 10/31/22 1137   Change in Wound Size % -300 10/31/22 1137   Tunneling 0 cm 10/31/22 1137   Tunneling in depth located at 0 10/31/22 1137   Undermining 0 10/31/22 1137   Undermining is depth extending from 0 10/31/22 1137   Wound Site Closure ALICIA 10/31/22 1137   Drainage Amount Scant 10/31/22 1137   Drainage Description Serosanguineous 10/31/22 1137   Non-staged Wound Description Full thickness 10/31/22 1137   Treatments Cleansed 10/31/22 1137   Dressing Foam, Silicon (eg  Allevyn, etc); Moisture barrier 10/31/22 1137   Wound packed? No 10/31/22 1137   Packing- # removed 0 10/31/22 1137   Packing- # inserted 0 10/31/22 1137   Dressing Changed New 10/31/22 1137   Patient Tolerance Tolerated well 10/31/22 1137   Dressing Status Dry;Clean; Intact 10/31/22 [address] BSN, RN, Ck & Nicola

## 2022-10-31 NOTE — PROGRESS NOTES
[facility]  Progress Note - Crow Pearl 1945, 76 y o  female MRN: [id number]  Unit/Bed#: [facility] 323-01 Encounter: [id number]  Primary Care Provider: Wesley Daugherty MD   Date and time admitted to hospital: 8/30/2022 11:49 PM    Hypercalcemia  Assessment & Plan  · Noted on labs 10/24  · PTH intact appropriately decreased  · Nephrology following  · Hold all vitamin D and calcium supplements for now  · Appreciate nephrology recs  · Lack of mobility could by playing a part    Hyperkalemia  Assessment & Plan    · Nephrology following - giving calcium gluconate, D50/insulin, sodium bicarb given October 5th  · Low-potassium pleasure feed diet  · Change tube feeds to Nephro  · k normal now    Urinary retention  Assessment & Plan  · Has required intermittent catheterizations however now voiding spontaneously  · Continue retention protocol    Hypotension  Assessment & Plan  · Continue midodrine 10 mg t i d     Acute encephalopathy  Assessment & Plan  · Patient has waxing waning mental status   · Cognition and mental status issues multifactorial in nature stemming from prolonged illness, numerous hospitalizations, chronic malnutrition, and chronic/recurrent infections  · Monitor clinically  · Delirium precautions  · Neuropsych consulted for capacity eval-patient does not have capacity per neuropsych  · Given history of bipolar disorder, psychiatric consult requested to ensure this has been optimized - his saw patient on 10/19, no changes necessary at this time  · Paranoid about seeing a large group of people in her room who came in through the window, otherwise stable mental status currently mentation is stable continue to monitor   · Rapid response was called on 10/27 evening as she was found unresponsive by the nurse, but she was back to her baseline awake and alert by the time every but he reached    She has not had any such concerns again  · Patient's brother concerned about ongoing delirium, confusion of location, sometimes frightening thoughts - suggest try decreasing oxycodone, & then if doesnt improve then consider adding daytime med      Acute on chronic anemia  Assessment & Plan  · 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  · S/p recent EGD in 7/22 revealing: Residual marginal ulcer of the gastrojejunostomy anastomosis with improved size  · S/p EGD 9/2 revealing: Large, cratered ulcer in the gastrojejunal anastomosis   · Status post 1 unit PRBCs this admission  · Per previous provider d/w nephrology, requested to switch from PPI to pepcid given c/f eosinophilia, will need to hold off on this given EGD findings with ulcer for now      Bacteremia  Assessment & Plan  · Recent hx of staph epi bacteremia in 7/2022, presumed due to picc line  · Recurrence 1/2 set staph epi, 2nd set without growth  Was treated with intravenous vancomycin for presumed line infection  · S/p TAVIA (9/9) no evidence of vegetation  · Blood cultures had been negative however repeated again due to isolated fever which are negative x 5 days   · PICC and TPN discontinued, now has PEG tube  · Monitor off abx-monitoring for fevers, up trending leukocytosis  · Leukocytosis stable    Moderate protein-calorie malnutrition ([facility] [address] )  Assessment & Plan  Malnutrition Findings:   Adult Malnutrition type: Chronic illness  Adult Degree of Malnutrition: Other severe protein calorie malnutrition  Malnutrition Characteristics: Weight loss, Muscle loss       360 Statement: Severe/Chronic malnutrition r/t condition, as evidenced by 4 8% wt loss x < 1 week (9/2/22: 125#, 9/5/22: 119#), and severe muscle mass depletion (temples/clavicle)  Treated with liberalized diet and nutrition supplements, possibly nutrition support pending goals of care    BMI Findings: Body mass index is 18 71 kg/m²     · Continue tube feeds    Fall  Assessment & Plan  · 10/11/22 afternoon and rapid response was called for fall  -as per RR note and per patient, she was leaning forward , getting gou tof the bed  to grab a pillow, her leg got caught  and she fell  -denies head strike, loss of consciousness, pain, any lightheadaness, diaphoresis before or after fall  -patient moving all extremities after  Fall  - vital signs stable  -patient was placed on soft C-collar and CT head as well as cervical spine was obtained stat-result showed no fracture or subluxation  -fall precaution  -d/w brother Hank Al and updated her status/incident of fall   Also discussed regardign GOC    Diarrhea  Assessment & Plan  · Recent history of C diff colitis  · Treated with prophylactic dose of p o  Vancomycin while on antibiotics completed course     Ambulatory dysfunction  Assessment & Plan  · Will need rehab on discharge  · Case management for dispo planning       NOAH (acute kidney injury) ([facility] [address] )  Assessment & Plan  · Renal function appears to have stabilized around creatinine of 1 5-1 7  · Patient intermittently refusing labs, trend as able  · Possibly new baseline per Nephrology  · Creatinine peaked to 2 0 before trending down      H/O bariatric surgery - bypass  Assessment & Plan  · History of Savage-en-Y gastric bypass  Has had significant issues over the last 6 months or so with chronic malnutrition and an anastomotic ulcerations  When last seen by bariatric surgery, it was recommended to continue on prolonged TPN for nutritional optimization with possible revision of the Savage-en-Y in the future  · Given her recurrence PICC line infections and bacteremia, TPN has been discontinued  · Continue tube feeds for nutritional optimization  Patient has been tolerating tube feeds without any difficulty    Is also continued on pleasure feeds  · Will need significant improvement before consideration can be given to further surgical intervention      Abnormal CT scan  Assessment & Plan  · Noted on CT C/A/P 9/26  - Focal thickening of the lower portion of the endometrium  While this is not expected in a patient of this age, this appears stable from 2016 suggesting a benign etiology  If there is clinical concern, follow-up ultrasound is recommended  - Small left upper lobe nodules, one of which is new from March 2022  Given the history of smoking, a follow-up chest CT is recommended in 12 months  · This was d/w sister and brother POA over phone by prior provider 9/28    Hyponatremia  Assessment & Plan  · Nephrology following  ·  Hyponatremia the setting of poor solute intake with gastric bypass, continue with salt tablets 2 g t i d , minimize free water flushes with tube feeds  · Sodium appears stable   · Continue to monitor     Hypothyroidism  Assessment & Plan  · TSH elevated and fT4 low  · Endocrinology consulted - levothyroxine timing was adjusted to 2pm per Endocrinology recs (4 hours after tube feeds have stopped running)  · Repeat TFTs in 1 week per Endocrine - TSH checked and elevated at 27 improved from 67 T4 now normal    * Sepsis ([facility] [address] )  Assessment & Plan  · Initially met criteria with fever, tachypnea, tachycardia, and leukocytosis on admission  Sepsis secondary to recurrent bacteremia and PICC line infections  · PICC line has been removed and patient had PEG tube placed for nutritional support  · Finished full course of ertapenem for ESBL UTI  · Patient with recurrent fever 10/7/22- Started empiric vancomycin whic was dced  · Discussed with infectious disease  observe off of antibiotic , cultures- sent on 10/9/22- negative            VTE Pharmacologic Prophylaxis: VTE Score: 3 Moderate Risk (Score 3-4) - Pharmacological DVT Prophylaxis Ordered: heparin  Patient Centered Rounds: I performed bedside rounds with nursing staff today  Discussions with Specialists or Other Care Team Provider:     Education and Discussions with Family / Patient: Updated  (brother) via phone  Time Spent for Care: 30 minutes   More than 50% of total time spent on counseling and coordination of care as described above  Current Length of Stay: 64 day(s)  Current Patient Status: Inpatient   Certification Statement: The patient will continue to require additional inpatient hospital stay due to monitor mental status, work on dispo plan  Discharge Plan: stable for DC    Code Status: Level 3 - DNAR and DNI    Subjective:   Feels ok, + abdominal discomfort    Objective:     Vitals:   Temp (24hrs), Av 2 °F (36 8 °C), Min:98 2 °F (36 8 °C), Max:98 2 °F (36 8 °C)    Temp:  [98 2 °F (36 8 °C)] 98 2 °F (36 8 °C)  HR:  [85] 85  BP: (116-141)/(63-68) 116/63  SpO2:  [91 %-92 %] 92 %  Body mass index is 18 71 kg/m²  Input and Output Summary (last 24 hours): Intake/Output Summary (Last 24 hours) at 10/31/2022 1543  Last data filed at 10/31/2022 0217  Gross per 24 hour   Intake 690 ml   Output 0 ml   Net 690 ml       Physical Exam:   Physical Exam  Vitals reviewed  HENT:      Head: Normocephalic and atraumatic  Mouth/Throat:      Mouth: Mucous membranes are moist    Eyes:      Conjunctiva/sclera: Conjunctivae normal    Cardiovascular:      Rate and Rhythm: Normal rate and regular rhythm  Heart sounds: Normal heart sounds  Pulmonary:      Effort: Pulmonary effort is normal  No respiratory distress  Breath sounds: Normal breath sounds  No wheezing  Abdominal:      General: There is no distension  Palpations: Abdomen is soft  Tenderness: There is no abdominal tenderness  Musculoskeletal:      Right lower leg: No edema  Left lower leg: No edema  Neurological:      Mental Status: She is alert            Additional Data:     Labs:  Results from last 7 days   Lab Units 10/28/22  1317 10/25/22  0613   WBC Thousand/uL 12 85* 12 28*   HEMOGLOBIN g/dL 7 8* 7 1*   HEMATOCRIT % 24 7* 22 8*   PLATELETS Thousands/uL 532* 501*   NEUTROS PCT %  --  59   LYMPHS PCT %  --  17   MONOS PCT %  --  7   EOS PCT %  --  15*     Results from last 7 days Lab Units 10/28/22  1317   SODIUM mmol/L 134*   POTASSIUM mmol/L 4 0   CHLORIDE mmol/L 104   CO2 mmol/L 24   BUN mg/dL 40*   CREATININE mg/dL 1 62*   ANION GAP mmol/L 6   CALCIUM mg/dL 10 0   ALBUMIN g/dL 2 3*   TOTAL BILIRUBIN mg/dL 0 38   ALK PHOS U/L 97   ALT U/L 17   AST U/L 22   GLUCOSE RANDOM mg/dL 90         Results from last 7 days   Lab Units 10/31/22  1203 10/30/22  1734 10/30/22  1102 10/30/22  0720 10/30/22  0005 10/29/22  1759 10/29/22  1112 10/29/22  0045 10/28/22  1648 10/27/22  2349 10/27/22  1839 10/27/22  0630   POC GLUCOSE mg/dl 102 93 101 114 107 112 109 100 85 90 73 101               Lines/Drains:  Invasive Devices  Report    Peripheral Intravenous Line  Duration           Peripheral IV 10/26/22 Right Wrist 4 days          Drain  Duration           Gastrostomy/Enterostomy Percutaneous Endoscopic Gastrostomy (PEG) 20 Fr  LUQ 52 days    External Urinary Catheter 2 days                      Imaging: No pertinent imaging reviewed      Recent Cultures (last 7 days):         Last 24 Hours Medication List:   Current Facility-Administered Medications   Medication Dose Route Frequency Provider Last Rate   • acetaminophen  975 mg Oral Q6H PRN Michael Chacko PA-C     • ALPRAZolam  0 25 mg Oral TID PRN Neno Mcintosh DO     • bisacodyl  10 mg Rectal Daily PRN Michell Dickson MD     • clonazePAM  3 mg Oral HS Jovita Mcdermott DO     • folic acid  1 mg Oral Daily Jovita Mcdermott DO     • guaiFENesin  600 mg Oral Q12H [address] Michael Chacko PA-C     • heparin (porcine)  5,000 Units Subcutaneous [facility] Neno Mcintosh DO     • levothyroxine  125 mcg Oral Q24H Oluwatomisin Callum Milton MD     • metoclopramide  10 mg Intravenous Q6H PRN Michell Dickson MD     • midodrine  7 5 mg Oral TID [facility] Paige Pack, DO     • ondansetron  4 mg Intravenous Q6H PRN Kwan Buckley DO     • oxyCODONE  5 mg Oral Q6H Michell Dickson MD     • pantoprazole  40 mg Oral Early Morning Bertin Alcantar MD     • polyethylene glycol  17 g Per PEG Tube BID Meet Mohr DO     • senna  2 tablet Oral HS Omid Bell MD     • sodium chloride  2 g Oral TID Monica Gallardo DO     • thiamine  100 mg Oral Daily Jovita Mcdermott DO     • traZODone  150 mg Oral HS Meet Mohr DO          Today, Patient Was Seen By: Ольга Laboy    **Please Note: This note may have been constructed using a voice recognition system  **

## 2022-10-31 NOTE — OCCUPATIONAL THERAPY NOTE
Occupational Therapy Treatment Note       10/31/22 1354   OT Last Visit   OT Visit Date 10/31/22   Note Type   Note Type Treatment   Pain Assessment   Pain Assessment Tool 0-10   Pain Score No Pain   Restrictions/Precautions   Weight Bearing Precautions Per Order No   Braces or Orthoses   (none)   Lifestyle   Autonomy I adls and mobility -i iadls   Reciprocal Relationships supportive family -   Service to Others retired   Intrinsic Gratification gardening   ADL   Where Assessed Edge of bed   Grooming Assistance 2  Maximal Assistance   Grooming Deficit Brushing hair   Bed Mobility   Supine to Sit 3  Moderate assistance   Additional items Assist x 1   Sit to Supine 4  Minimal assistance   Additional items Assist x 1;Leg    Toilet Transfers   Toilet Transfer From Rolling walker   Toilet Transfer Type To and from   Toilet Transfer to Standard toilet   Toilet Transfer Technique Ambulating   Toilet Transfers Moderate assistance   Subjective   Subjective pt stated "you people are so nice" pt was pleasant and cooperative with all therapy requests  Cognition   Overall Cognitive Status Impaired   Arousal/Participation Alert; Cooperative   Attention Attends with cues to redirect   Orientation Level Oriented to person;Oriented to place;Oriented to time;Disoriented to situation   Memory Unable to assess   Following Commands Follows one step commands with increased time or repetition   Activity Tolerance   Activity Tolerance Patient limited by fatigue   Assessment   Assessment Pt seen for pt participation in Occupational Therapy session with focus on activity tolerance, bed mob, and unsupported sitting balance and tolerance for pt engagement in UBself-care tasks and home  Pt cleared by CHITO/ El for pt participated in OT session  Pt presented HOB raised pt awake/alert and agreeable to participate in therapy following pt identifiers confirmed     Pt was unable to report her therapy goal 2* pt cognitive impairments however pt pleasant and cooperative with all therapy requests  Pt required assist for bed mob and unsupported sitting balance 2* pt deconditioning  She required assist for grooming tasks before complaining of fatigue  Pt will require post acute rehab service to continue to address these above noted pt deficit which currently impair pt ADL and functional mob  Pt return to bed post session, and all needs within reach     Plan   Treatment Interventions ADL retraining   Goal Expiration Date 11/16/22   OT Treatment Day 10   OT Frequency 1-2x/wk   Recommendation   OT Discharge Recommendation Post acute rehabilitation services   AM-PAC Daily Activity Inpatient   Lower Body Dressing 2   Bathing 2   Toileting 2   Upper Body Dressing 3   Grooming 3   Eating 3   Daily Activity Raw Score 15   Daily Activity Standardized Score (Calc for Raw Score >=11) 34 69   AM-PAC Applied Cognition Inpatient   Following a Speech/Presentation 2   Understanding Ordinary Conversation 4   Taking Medications 2   Remembering Where Things Are Placed or Put Away 3   Remembering List of 4-5 Errands 2   Taking Care of Complicated Tasks 2   Applied Cognition Raw Score 15   Applied Cognition Standardized Score 33 54   Barthel Index   Feeding 10   Bathing 0   Grooming Score 0   Dressing Score 5   Bladder Score 5   Bowels Score 0   Toilet Use Score 5   Transfers (Bed/Chair) Score 5   Mobility (Level Surface) Score 0   Stairs Score 0   Barthel Index Score 30   Modified Edwards Scale   Modified Joey Scale 4     Ma Lusher  BHAKTA/L

## 2022-10-31 NOTE — PLAN OF CARE
Problem: OCCUPATIONAL THERAPY ADULT  Goal: Performs self-care activities at highest level of function for planned discharge setting  See evaluation for individualized goals  Description: Treatment Interventions: ADL retraining, Functional transfer training, Endurance training, UE strengthening/ROM, Cognitive reorientation, Patient/family training, Equipment evaluation/education, Compensatory technique education, Continued evaluation, Energy conservation, Activityengagement          See flowsheet documentation for full assessment, interventions and recommendations  Outcome: Progressing  Note: Limitation: Decreased ADL status, Decreased endurance, Decreased self-care trans, Decreased high-level ADLs  Prognosis: Fair  Assessment: Pt seen for pt participation in Occupational Therapy session with focus on activity tolerance, bed mob, and unsupported sitting balance and tolerance for pt engagement in UBself-care tasks and home  Pt cleared by RN/ El for pt participated in OT session  Pt presented HOB raised pt awake/alert and agreeable to participate in therapy following pt identifiers confirmed  Pt was unable to report her therapy goal 2* pt cognitive impairments however pt pleasant and cooperative with all therapy requests  Pt required assist for bed mob and unsupported sitting balance 2* pt deconditioning  She required assist for grooming tasks before complaining of fatigue  Pt will require post acute rehab service to continue to address these above noted pt deficit which currently impair pt ADL and functional mob  Pt return to bed post session, and all needs within reach       OT Discharge Recommendation: Post acute rehabilitation services

## 2022-10-31 NOTE — ASSESSMENT & PLAN NOTE
· Patient has waxing waning mental status   · Cognition and mental status issues multifactorial in nature stemming from prolonged illness, numerous hospitalizations, chronic malnutrition, and chronic/recurrent infections  · Monitor clinically  · Delirium precautions  · Neuropsych consulted for capacity eval-patient does not have capacity per neuropsych  · Given history of bipolar disorder, psychiatric consult requested to ensure this has been optimized - his saw patient on 10/19, no changes necessary at this time  · Paranoid about seeing a large group of people in her room who came in through the window, otherwise stable mental status currently mentation is stable continue to monitor   · Rapid response was called on 10/27 evening as she was found unresponsive by the nurse, but she was back to her baseline awake and alert by the time every but he reached    She has not had any such concerns again  · Patient's brother concerned about ongoing delirium, confusion of location, sometimes frightening thoughts - suggest try decreasing oxycodone, & then if doesnt improve then consider adding daytime med

## 2022-10-31 NOTE — OCCUPATIONAL THERAPY NOTE
Occupational Therapy Treatment Note       10/31/22 1354   OT Last Visit   OT Visit Date 10/31/22   Note Type   Note Type Treatment   Pain Assessment   Pain Assessment Tool 0-10   Pain Score No Pain   Restrictions/Precautions   Weight Bearing Precautions Per Order No   Braces or Orthoses   (none)   Lifestyle   Autonomy I adls and mobility -i iadls   Reciprocal Relationships supportive family -   Service to Others retired   Intrinsic Gratification gardening   ADL   Where Assessed Edge of bed   Grooming Assistance 2  Maximal Assistance   Grooming Deficit Brushing hair   Bed Mobility   Supine to Sit 3  Moderate assistance   Additional items Assist x 1   Sit to Supine 4  Minimal assistance   Additional items Assist x 1;Leg    Toilet Transfers   Toilet Transfer From Rolling walker   Toilet Transfer Type To and from   Toilet Transfer to Standard toilet   Toilet Transfer Technique Ambulating   Toilet Transfers Moderate assistance   Subjective   Subjective pt stated "you people are so nice" pt was pleasant and cooperative with all therapy requests  Cognition   Overall Cognitive Status Impaired   Arousal/Participation Alert; Cooperative   Attention Attends with cues to redirect   Orientation Level Oriented to person;Oriented to place;Oriented to time;Disoriented to situation   Memory Unable to assess   Following Commands Follows one step commands with increased time or repetition   Activity Tolerance   Activity Tolerance Patient limited by fatigue   Assessment   Assessment Pt seen for pt participation in Occupational Therapy session with focus on activity tolerance, bed mob, and unsupported sitting balance and tolerance for pt engagement in UBself-care tasks and home  Pt cleared by CHITO/ El for pt participated in OT session  Pt presented HOB raised pt awake/alert and agreeable to participate in therapy following pt identifiers confirmed     Pt was unable to report her therapy goal 2* pt cognitive impairments however pt pleasant and cooperative with all therapy requests  Pt required assist for bed mob and unsupported sitting balance 2* pt deconditioning  She required assist for grooming tasks before complaining of fatigue  Pt will require post acute rehab service to continue to address these above noted pt deficit which currently impair pt ADL and functional mob  Pt return to bed post session, and all needs within reach     Plan   Treatment Interventions ADL retraining   Goal Expiration Date 11/16/22   OT Treatment Day 10   OT Frequency 1-2x/wk   Recommendation   OT Discharge Recommendation Post acute rehabilitation services   AM-PAC Daily Activity Inpatient   Lower Body Dressing 2   Bathing 2   Toileting 2   Upper Body Dressing 3   Grooming 3   Eating 3   Daily Activity Raw Score 15   Daily Activity Standardized Score (Calc for Raw Score >=11) 34 69   AM-PAC Applied Cognition Inpatient   Following a Speech/Presentation 2   Understanding Ordinary Conversation 4   Taking Medications 2   Remembering Where Things Are Placed or Put Away 3   Remembering List of 4-5 Errands 2   Taking Care of Complicated Tasks 2   Applied Cognition Raw Score 15   Applied Cognition Standardized Score 33 54   Barthel Index   Feeding 10   Bathing 0   Grooming Score 0   Dressing Score 5   Bladder Score 5   Bowels Score 0   Toilet Use Score 5   Transfers (Bed/Chair) Score 5   Mobility (Level Surface) Score 0   Stairs Score 0   Barthel Index Score 30   Modified Ragan Scale   Modified Joey Scale 4     Nasima Moscoso  BHAKTA/L

## 2022-10-31 NOTE — PROGRESS NOTES
NEPHROLOGY PROGRESS NOTE   Sharla Alpers 76 y o  female MRN: [id number]  Unit/Bed#: [facility] 323-01 Encounter: [id number]  Reason for Consult:  Acute kidney injury    ASSESSMENT/PLAN:  1  Acute kidney injury etiology multifactorial, component secondary to ischemic injury given hemodynamic fluctuations and recent sepsis  2  Chronic kidney disease with previous baseline creatinine 0 8-1 2  3  Chronic hypotension, remains on midodrine 10 mg 3 times daily  4  Anemia of chronic disease with recent GI bleed  5  Hyponatremia, continue with empiric sodium chloride tablets currently 2 g 3 times daily  6  Hypercalcemia, attributed to possible immobilization, recent PTH suppressed at 6 3, ionized calcium minimally elevated, normal 25 hydroxy vitamin-D   CT of the chest pelvis showed new left upper lobe nodules  7  UTI, management as per primary service  8  Malnutrition, recent PEG tube in place    PLAN:  · Patient agreeable for laboratory studies currently, discussed with nursing  · Volume status blood pressure appears stable  · Await repeat laboratory studies  · No other changes from Nephrology standpoint    SUBJECTIVE:  Seen examined  Patient is awake alert  Complains of wheezing as well as cough  Review of Systems    OBJECTIVE:  Current Weight: Weight - Scale:  (unable to stand, no bed scale)  Vitals:    10/30/22 1541 10/30/22 1800 10/31/22 0213 10/31/22 0900   BP: 129/62 141/68  116/63   BP Location: Right arm      Pulse: 92   85   Resp: 20      Temp: 99 3 °F (37 4 °C)   98 2 °F (36 8 °C)   TempSrc: Oral   Oral   SpO2: 91%  91% 92%   Weight:       Height:           Intake/Output Summary (Last 24 hours) at 10/31/2022 1241  Last data filed at 10/31/2022 0217  Gross per 24 hour   Intake 690 ml   Output 0 ml   Net 690 ml       Physical Exam  Constitutional:       Appearance: She is not ill-appearing  Cardiovascular:      Rate and Rhythm: Normal rate and regular rhythm     Pulmonary:      Effort: Pulmonary effort is normal       Breath sounds: Wheezing present  Abdominal:      General: There is no distension  Palpations: Abdomen is soft  Musculoskeletal:      Right lower leg: No edema  Left lower leg: No edema  Skin:     General: Skin is warm and dry  Neurological:      Mental Status: She is alert  She is disoriented           Medications:    Current Facility-Administered Medications:   •  acetaminophen (TYLENOL) tablet 975 mg, 975 mg, Oral, Q6H PRN, Stevan Deleon PA-C, 975 mg at 10/26/22 1114  •  ALPRAZolam Tram [place]) tablet 0 25 mg, 0 25 mg, Oral, TID PRN, [place] Jace, DO, 0 25 mg at 09/24/22 1616  •  bisacodyl (DULCOLAX) rectal suppository 10 mg, 10 mg, Rectal, Daily PRN, Vivien Tidwell MD  •  clonazePAM (KlonoPIN) tablet 3 mg, 3 mg, Oral, HS, Jovita Mcdermott, DO, 3 mg at 04/11/36 1633  •  folic acid (FOLVITE) tablet 1 mg, 1 mg, Oral, Daily, Jovita Mcdermott, DO, 1 mg at 10/31/22 0840  •  guaiFENesin (MUCINEX) 12 hr tablet 600 mg, 600 mg, Oral, Q12H [address], Aurelia Encinas PA-C, 600 mg at 10/31/22 0840  •  heparin (porcine) subcutaneous injection 5,000 Units, 5,000 Units, Subcutaneous, Q8H [address], [place] Jace DO, 5,000 Units at 10/31/22 0840  •  levothyroxine tablet 125 mcg, 125 mcg, Oral, Q24H, [place] Janece Burkitt, MD, 125 mcg at 10/31/22 0550  •  metoclopramide (REGLAN) injection 10 mg, 10 mg, Intravenous, Q6H PRN, Vivien Tidwell MD  •  midodrine (PROAMATINE) tablet 10 mg, 10 mg, Oral, TID AC, Haley Copeland MD, 10 mg at 10/30/22 0941  •  ondansetron (ZOFRAN) injection 4 mg, 4 mg, Intravenous, Q6H PRN, Jovita Mcdermott, DO, 4 mg at 09/18/22 1339  •  oxyCODONE (ROXICODONE) IR tablet 5 mg, 5 mg, Oral, Q6H, Vivien Tidwell MD, 5 mg at 10/31/22 1237  •  pantoprazole (PROTONIX) EC tablet 40 mg, 40 mg, Oral, Early Morning, Hayes Duncan MD, 40 mg at 10/31/22 0507  •  polyethylene glycol (MIRALAX) packet 17 g, 17 g, Per PEG Tube, BID, Jovita Mcdermott DO, 17 g at 10/31/22 0840  •  senna (SENOKOT) tablet 17 2 mg, 2 tablet, Oral, HS, Reymundo Multani MD, 17 2 mg at 10/30/22 2145  •  sodium chloride tablet 2 g, 2 g, Oral, TID, Marguarite Finical, DO, 2 g at 10/31/22 1426  •  thiamine tablet 100 mg, 100 mg, Oral, Daily, Jovita Mcdermott, DO, 100 mg at 10/31/22 0840  •  traZODone (DESYREL) tablet 150 mg, 150 mg, Oral, HS, Jovita Mcdermott, DO, 150 mg at 10/30/22 2144    Laboratory Results:  Results from last 7 days   Lab Units 10/28/22  1317 10/26/22  0618 10/25/22  0613   WBC Thousand/uL 12 85*  --  12 28*   HEMOGLOBIN g/dL 7 8*  --  7 1*   HEMATOCRIT % 24 7*  --  22 8*   PLATELETS Thousands/uL 532*  --  501*   POTASSIUM mmol/L 4 0 3 9 4 3   CHLORIDE mmol/L 104 106 102   CO2 mmol/L 24 23 26   BUN mg/dL 40* 41* 45*   CREATININE mg/dL 1 62* 1 82* 2 00*   CALCIUM mg/dL 10 0 9 7 10 4*

## 2022-10-31 NOTE — PROGRESS NOTES
Pt refused her labwork this am, agreed to [facility] with bed change and applied

## 2022-11-01 NOTE — ASSESSMENT & PLAN NOTE
· 10/11/22 afternoon and rapid response was called for fall  -as per RR note and per patient, she was leaning forward , getting out of the bed  to grab a pillow, her leg got caught  and she fell  -denies head strike, loss of consciousness, pain, any lightheadaness, diaphoresis before or after fall  -patient moving all extremities after  Fall  - vital signs stable  -patient was placed on soft C-collar and CT head as well as cervical spine was obtained stat-result showed no fracture or subluxation  -fall precaution  -d/w brother Ofelia Contreras and updated her status/incident of fall   Also discussed regarding [address]

## 2022-11-01 NOTE — ASSESSMENT & PLAN NOTE
· Renal function appears to have stabilized around creatinine of 1 5-1 7  · Possibly new baseline per Nephrology    · Creatinine peaked to 2 0 before trending down

## 2022-11-01 NOTE — PLAN OF CARE
Problem: PHYSICAL THERAPY ADULT  Goal: Performs mobility at highest level of function for planned discharge setting  See evaluation for individualized goals  Description:    Equipment Recommended:  (RW)       See flowsheet documentation for full assessment, interventions and recommendations  Outcome: Progressing  Note: Prognosis: Fair  Problem List: Decreased strength, Decreased endurance, Impaired balance, Decreased mobility, Decreased skin integrity  Assessment: The patient continues to demonstrate decreased awareness of her safety as well as limited insight into her deficits  She was able to stand three times and ambulate a few feet before quickly sitting back onto the bed  She declined any further ambulation nor any need to walk into the bathroom  The patient was able to demonstrate therapeutic exercises with instruction, redirection to task, and increased time  She continues to remain a high fall risk due to her physical deficits as well as her cognitive challenges  She will benefit from continued therapies in order to maximize her functional mobility and safety  Barriers to Discharge: Inaccessible home environment, Decreased caregiver support     PT Discharge Recommendation: Post acute rehabilitation services    See flowsheet documentation for full assessment

## 2022-11-01 NOTE — PROGRESS NOTES
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) and severe malnutrition s/p PEJ placement on 09/07    PEJ malfunction    · Patient had PEJ placement on 09/07  GI was previously consulted because her feeding tube was clogged  On our evaluation it was already unclog it with soda flush  · On today's evaluation there was tube feed residue seen in the feeding tube  This was flushed with water twice with no problems  Jejunostomy tube can be used for feeding and medications  · Recommend flushing with water after before and after overnight feed to prevent the feeding tube from clogging again    GI will sign off at this time    Please call with any questions or concerns  ______________________________________________________________________    Subjective:     Patient denies any abdominal pain     Medication Administration - last 24 hours from 10/31/2022 0805 to 11/01/2022 0805       Date/Time Order Dose Route Action Action by     10/31/2022 2115 clonazePAM (KlonoPIN) tablet 3 mg 3 mg Oral Given Lai Veras RN     12/71/1041 4655 folic acid (FOLVITE) tablet 1 mg 1 mg Oral Given Padmaja Burger RN     10/31/2022 0840 thiamine tablet 100 mg 100 mg Oral Given Padmaja Burger RN     10/31/2022 2115 traZODone (DESYREL) tablet 150 mg 150 mg Oral Given Lai Veras RN     11/01/2022 0003 heparin (porcine) subcutaneous injection 5,000 Units 5,000 Units Subcutaneous Refused Lai Veras RN     10/31/2022 1658 heparin (porcine) subcutaneous injection 5,000 Units 5,000 Units Subcutaneous Given Reyna Lama     10/31/2022 0840 heparin (porcine) subcutaneous injection 5,000 Units 5,000 Units Subcutaneous Given Padmaja Burger RN 11/01/2022 0537 oxyCODONE (ROXICODONE) IR tablet 5 mg 5 mg Oral Given Lillie Dock, RN     11/01/2022 0205 oxyCODONE (ROXICODONE) IR tablet 5 mg 5 mg Oral Refused [facility], [facility]     10/31/2022 1802 oxyCODONE (ROXICODONE) IR tablet 5 mg 5 mg Oral Given Cherylle Graves     10/31/2022 1237 oxyCODONE (ROXICODONE) IR tablet 5 mg 5 mg [facility]     10/31/2022 2123 senna (SENOKOT) tablet 17 2 mg 17 2 mg Oral Refused Lillie Dock, RN     10/31/2022 2123 polyethylene glycol (MIRALAX) packet 17 g 17 g Per PEG Tube Refused Lillie Dock, RN     10/31/2022 0840 polyethylene glycol (MIRALAX) packet 17 g 17 g Per PEG Tube [facility]     10/31/2022 2115 guaiFENesin (MUCINEX) 12 hr tablet 600 mg 600 mg Oral Given Lillie Dock, RN     10/31/2022 0840 guaiFENesin (MUCINEX) 12 hr tablet 600 mg 600 mg Oral [facility]     10/31/2022 1235 midodrine (PROAMATINE) tablet 10 mg 10 mg Oral Not Given [place] Boaz, RN     10/31/2022 0841 midodrine (PROAMATINE) tablet 10 mg 10 mg Oral Not Given [facility]     10/31/2022 2115 sodium chloride tablet 2 g 2 g Oral Given Lillie Dock, RN     10/31/2022 1654 sodium chloride tablet 2 g 2 g Oral Given Cherylle Graves     10/31/2022 3351 sodium chloride tablet 2 g 2 g Oral Given [facility]     11/01/2022 8375 levothyroxine tablet 125 mcg 125 mcg Oral Given Lillie Dock, RN     11/01/2022 0537 pantoprazole (PROTONIX) EC tablet 40 mg 40 mg Oral Given Lillie Dock, RN     10/31/2022 1654 midodrine (PROAMATINE) tablet 7 5 mg 7 5 mg Oral Given Myriam Naranjo          Objective:     Vitals: Blood pressure 118/67, pulse 96, temperature 98 5 °F (36 9 °C), temperature source Axillary, resp  rate 18, height 5' 4" (1 626 m), weight 49 4 kg (109 lb), SpO2 93 %  ,Body mass index is 18 71 kg/m²      No intake or output data in the 24 hours ending 11/01/22 9423    Physical Exam:   General Appearance: Awake and alert, in no acute distress  Abdomen: Soft, non-tender, non-distended; bowel sounds normal; no masses or no organomegaly    Invasive Devices  Report    Peripheral Intravenous Line  Duration           Peripheral IV 10/26/22 Right Wrist 5 days          Drain  Duration           Gastrostomy/Enterostomy Percutaneous Endoscopic Gastrostomy (PEG) 20 Fr  LUQ 53 days    External Urinary Catheter 2 days                Lab Results:  No results displayed because visit has over 200 results  Imaging Studies: I have personally reviewed pertinent imaging studies

## 2022-11-01 NOTE — PLAN OF CARE
Problem: Potential for Falls  Goal: Patient will remain free of falls  Description: INTERVENTIONS:  - Educate patient/family on patient safety including physical limitations  - Instruct patient to call for assistance with activity   - Consult OT/PT to assist with strengthening/mobility   - Keep Call bell within reach  - Keep bed low and locked with side rails adjusted as appropriate  - Keep care items and personal belongings within reach  - Initiate and maintain comfort rounds  - Make Fall Risk Sign visible to staff  - Apply yellow socks and bracelet for high fall risk patients  - Consider moving patient to room near nurses station  Outcome: Progressing     Problem: MOBILITY - ADULT  Goal: Maintain or return to baseline ADL function  Description: INTERVENTIONS:  -  Assess patient's ability to carry out ADLs; assess patient's baseline for ADL function and identify physical deficits which impact ability to perform ADLs (bathing, care of mouth/teeth, toileting, grooming, dressing, etc )  - Assess/evaluate cause of self-care deficits   - Assess range of motion  - Assess patient's mobility; develop plan if impaired  - Assess patient's need for assistive devices and provide as appropriate  - Encourage maximum independence but intervene and supervise when necessary  - Involve family in performance of ADLs  - Assess for home care needs following discharge   - Consider OT consult to assist with ADL evaluation and planning for discharge  - Provide patient education as appropriate  Outcome: Progressing  Goal: Maintains/Returns to pre admission functional level  Description: INTERVENTIONS:  - Perform BMAT or MOVE assessment daily    - Set and communicate daily mobility goal to care team and patient/family/caregiver     - Collaborate with rehabilitation services on mobility goals if consulted  - Out of bed for toileting  - Record patient progress and toleration of activity level   Outcome: Progressing     Problem: INFECTION - ADULT  Goal: Absence or prevention of progression during hospitalization  Description: INTERVENTIONS:  - Assess and monitor for signs and symptoms of infection  - Monitor lab/diagnostic results  - Monitor all insertion sites, i e  indwelling lines, tubes, and drains  - Monitor endotracheal if appropriate and nasal secretions for changes in amount and color  - [place] appropriate cooling/warming therapies per order  - Administer medications as ordered  - Instruct and encourage patient and family to use good hand hygiene technique  - Identify and instruct in appropriate isolation precautions for identified infection/condition  Outcome: Progressing  Goal: Absence of fever/infection during neutropenic period  Description: INTERVENTIONS:  - Monitor WBC    Outcome: Progressing     Problem: SAFETY ADULT  Goal: Patient will remain free of falls  Description: INTERVENTIONS:  - Educate patient/family on patient safety including physical limitations  - Instruct patient to call for assistance with activity   - Consult OT/PT to assist with strengthening/mobility   - Keep Call bell within reach  - Keep bed low and locked with side rails adjusted as appropriate  - Keep care items and personal belongings within reach  - Initiate and maintain comfort rounds  - Make Fall Risk Sign visible to staff  - Apply yellow socks and bracelet for high fall risk patients  - Consider moving patient to room near nurses station  Outcome: Progressing  Goal: Maintain or return to baseline ADL function  Description: INTERVENTIONS:  -  Assess patient's ability to carry out ADLs; assess patient's baseline for ADL function and identify physical deficits which impact ability to perform ADLs (bathing, care of mouth/teeth, toileting, grooming, dressing, etc )  - Assess/evaluate cause of self-care deficits   - Assess range of motion  - Assess patient's mobility; develop plan if impaired  - Assess patient's need for assistive devices and provide as appropriate  - Encourage maximum independence but intervene and supervise when necessary  - Involve family in performance of ADLs  - Assess for home care needs following discharge   - Consider OT consult to assist with ADL evaluation and planning for discharge  - Provide patient education as appropriate  Outcome: Progressing  Goal: Maintains/Returns to pre admission functional level  Description: INTERVENTIONS:  - Perform BMAT or MOVE assessment daily    - Set and communicate daily mobility goal to care team and patient/family/caregiver  - Collaborate with rehabilitation services on mobility goals if consulted  - Out of bed for toileting  - Record patient progress and toleration of activity level   Outcome: Progressing     Problem: DISCHARGE PLANNING  Goal: Discharge to home or other facility with appropriate resources  Description: INTERVENTIONS:  - Identify barriers to discharge w/patient and caregiver  - Arrange for needed discharge resources and transportation as appropriate  - Identify discharge learning needs (meds, wound care, etc )  - Arrange for interpretive services to assist at discharge as needed  - Refer to Case Management Department for coordinating discharge planning if the patient needs post-hospital services based on physician/advanced practitioner order or complex needs related to functional status, cognitive ability, or social support system  Outcome: Progressing     Problem: Knowledge Deficit  Goal: Patient/family/caregiver demonstrates understanding of disease process, treatment plan, medications, and discharge instructions  Description: Complete learning assessment and assess knowledge base    Interventions:  - Provide teaching at level of understanding  - Provide teaching via preferred learning methods  Outcome: Progressing     Problem: SKIN/TISSUE INTEGRITY - ADULT  Goal: Skin Integrity remains intact(Skin Breakdown Prevention)  Description: Assess:  -Inspect skin when repositioning, toileting, and assisting with ADLS  -Assess extremities for adequate circulation and sensation     Bed Management:  -Have minimal linens on bed & keep smooth, unwrinkled  -Change linens as needed when moist or perspiring    Toileting:  -Offer bedside commode      Activity:  -Encourage activity and walks on unit  -Encourage or provide ROM exercises   -Use appropriate equipment to lift or move patient in bed      Skin Care:  -Avoid use of baby powder, tape, friction and shearing, hot water or constrictive clothing  -Do not massage red bony areas      Outcome: Progressing  Goal: Incision(s), wounds(s) or drain site(s) healing without S/S of infection  Description: INTERVENTIONS  - Assess and document dressing, incision, wound bed, drain sites and surrounding tissue  - Provide patient and family education  Outcome: Progressing  Goal: Pressure injury heals and does not worsen  Description: Interventions:  - Implement low air loss mattress or specialty surface (Criteria met)  - Apply silicone foam dressing  - Apply fecal or urinary incontinence containment device   - Utilize friction reducing device or surface for transfers   - Consider nutrition services referral as needed  Outcome: Progressing     Problem: Prexisting or High Potential for Compromised Skin Integrity  Goal: Skin integrity is maintained or improved  Description: INTERVENTIONS:  - Identify patients at risk for skin breakdown  - Assess and monitor skin integrity  - Assess and monitor nutrition and hydration status  - Monitor labs   - Assess for incontinence   - Turn and reposition patient  - Assist with mobility/ambulation  - Relieve pressure over bony prominences  - Avoid friction and shearing  - Provide appropriate hygiene as needed including keeping skin clean and dry  - Evaluate need for skin moisturizer/barrier cream  - Collaborate with interdisciplinary team   - Patient/family teaching  - Consider wound care consult   Outcome: Progressing     Problem: Nutrition/Hydration-ADULT  Goal: Nutrient/Hydration intake appropriate for improving, restoring or maintaining nutritional needs  Description: Monitor and assess patient's nutrition/hydration status for malnutrition  Collaborate with interdisciplinary team and initiate plan and interventions as ordered  Monitor patient's weight and dietary intake as ordered or per policy  Utilize nutrition screening tool and intervene as necessary  Determine patient's food preferences and provide high-protein, high-caloric foods as appropriate       INTERVENTIONS:  - Monitor oral intake, urinary output, labs, and treatment plans  - Assess nutrition and hydration status and recommend course of action  - Evaluate amount of meals eaten  - Assist patient with eating if necessary   - Allow adequate time for meals  - Recommend/ encourage appropriate diets, oral nutritional supplements, and vitamin/mineral supplements  - Order, calculate, and assess calorie counts as needed  - Recommend, monitor, and adjust tube feedings and TPN/PPN based on assessed needs  - Assess need for intravenous fluids  - Provide specific nutrition/hydration education as appropriate  - Include patient/family/caregiver in decisions related to nutrition  Outcome: Progressing     Problem: SAFETY,RESTRAINT: NV/NON-SELF DESTRUCTIVE BEHAVIOR  Goal: Remains free of harm/injury (restraint for non violent/non self-detsructive behavior)  Description: INTERVENTIONS:  - Instruct patient/family regarding restraint use   - Assess and monitor physiologic and psychological status   - Provide interventions and comfort measures to meet assessed patient needs   - Identify and implement measures to help patient regain control  - Assess readiness for release of restraint   Outcome: Progressing  Goal: Returns to optimal restraint-free functioning  Description: INTERVENTIONS:  - Assess the patient's behavior and symptoms that indicate continued need for restraint  - Identify and implement measures to help patient regain control  - Assess readiness for release of restraint   Outcome: Progressing     Problem: Potential for Falls  Goal: Patient will remain free of falls  Description: INTERVENTIONS:  - Educate patient/family on patient safety including physical limitations  - Instruct patient to call for assistance with activity   - Consult OT/PT to assist with strengthening/mobility   - Keep Call bell within reach  - Keep bed low and locked with side rails adjusted as appropriate  - Keep care items and personal belongings within reach  - Initiate and maintain comfort rounds  - Make Fall Risk Sign visible to staff  - Apply yellow socks and bracelet for high fall risk patients  - Consider moving patient to room near nurses station  Outcome: Progressing     Problem: MOBILITY - ADULT  Goal: Maintain or return to baseline ADL function  Description: INTERVENTIONS:  -  Assess patient's ability to carry out ADLs; assess patient's baseline for ADL function and identify physical deficits which impact ability to perform ADLs (bathing, care of mouth/teeth, toileting, grooming, dressing, etc )  - Assess/evaluate cause of self-care deficits   - Assess range of motion  - Assess patient's mobility; develop plan if impaired  - Assess patient's need for assistive devices and provide as appropriate  - Encourage maximum independence but intervene and supervise when necessary  - Involve family in performance of ADLs  - Assess for home care needs following discharge   - Consider OT consult to assist with ADL evaluation and planning for discharge  - Provide patient education as appropriate  Outcome: Progressing  Goal: Maintains/Returns to pre admission functional level  Description: INTERVENTIONS:  - Perform BMAT or MOVE assessment daily    - Set and communicate daily mobility goal to care team and patient/family/caregiver     - Collaborate with rehabilitation services on mobility goals if consulted  - Out of bed for toileting  - Record patient progress and toleration of activity level   Outcome: Progressing     Problem: INFECTION - ADULT  Goal: Absence or prevention of progression during hospitalization  Description: INTERVENTIONS:  - Assess and monitor for signs and symptoms of infection  - Monitor lab/diagnostic results  - Monitor all insertion sites, i e  indwelling lines, tubes, and drains  - Monitor endotracheal if appropriate and nasal secretions for changes in amount and color  - [place] appropriate cooling/warming therapies per order  - Administer medications as ordered  - Instruct and encourage patient and family to use good hand hygiene technique  - Identify and instruct in appropriate isolation precautions for identified infection/condition  Outcome: Progressing  Goal: Absence of fever/infection during neutropenic period  Description: INTERVENTIONS:  - Monitor WBC    Outcome: Progressing     Problem: SAFETY ADULT  Goal: Patient will remain free of falls  Description: INTERVENTIONS:  - Educate patient/family on patient safety including physical limitations  - Instruct patient to call for assistance with activity   - Consult OT/PT to assist with strengthening/mobility   - Keep Call bell within reach  - Keep bed low and locked with side rails adjusted as appropriate  - Keep care items and personal belongings within reach  - Initiate and maintain comfort rounds  - Make Fall Risk Sign visible to staff  - Apply yellow socks and bracelet for high fall risk patients  - Consider moving patient to room near nurses station  Outcome: Progressing  Goal: Maintain or return to baseline ADL function  Description: INTERVENTIONS:  -  Assess patient's ability to carry out ADLs; assess patient's baseline for ADL function and identify physical deficits which impact ability to perform ADLs (bathing, care of mouth/teeth, toileting, grooming, dressing, etc )  - Assess/evaluate cause of self-care deficits   - Assess range of motion  - Assess patient's mobility; develop plan if impaired  - Assess patient's need for assistive devices and provide as appropriate  - Encourage maximum independence but intervene and supervise when necessary  - Involve family in performance of ADLs  - Assess for home care needs following discharge   - Consider OT consult to assist with ADL evaluation and planning for discharge  - Provide patient education as appropriate  Outcome: Progressing  Goal: Maintains/Returns to pre admission functional level  Description: INTERVENTIONS:  - Perform BMAT or MOVE assessment daily    - Set and communicate daily mobility goal to care team and patient/family/caregiver  - Collaborate with rehabilitation services on mobility goals if consulted  - Out of bed for toileting  - Record patient progress and toleration of activity level   Outcome: Progressing     Problem: DISCHARGE PLANNING  Goal: Discharge to home or other facility with appropriate resources  Description: INTERVENTIONS:  - Identify barriers to discharge w/patient and caregiver  - Arrange for needed discharge resources and transportation as appropriate  - Identify discharge learning needs (meds, wound care, etc )  - Arrange for interpretive services to assist at discharge as needed  - Refer to Case Management Department for coordinating discharge planning if the patient needs post-hospital services based on physician/advanced practitioner order or complex needs related to functional status, cognitive ability, or social support system  Outcome: Progressing     Problem: Knowledge Deficit  Goal: Patient/family/caregiver demonstrates understanding of disease process, treatment plan, medications, and discharge instructions  Description: Complete learning assessment and assess knowledge base    Interventions:  - Provide teaching at level of understanding  - Provide teaching via preferred learning methods  Outcome: Progressing     Problem: SKIN/TISSUE INTEGRITY - ADULT  Goal: Skin Integrity remains intact(Skin Breakdown Prevention)  Description: Assess:  -Inspect skin when repositioning, toileting, and assisting with ADLS  -Assess extremities for adequate circulation and sensation     Bed Management:  -Have minimal linens on bed & keep smooth, unwrinkled  -Change linens as needed when moist or perspiring      Toileting:  -Offer bedside commode      Activity:  -Encourage activity and walks on unit  -Encourage or provide ROM exercises   -Use appropriate equipment to lift or move patient in bed      Skin Care:  -Avoid use of baby powder, tape, friction and shearing, hot water or constrictive clothing  -Do not massage red bony areas    Outcome: Progressing  Goal: Incision(s), wounds(s) or drain site(s) healing without S/S of infection  Description: INTERVENTIONS  - Assess and document dressing, incision, wound bed, drain sites and surrounding tissue  - Provide patient and family education    Outcome: Progressing  Goal: Pressure injury heals and does not worsen  Description: Interventions:  - Implement low air loss mattress or specialty surface (Criteria met)  - Apply silicone foam dressing  - Apply fecal or urinary incontinence containment device   - Utilize friction reducing device or surface for transfers   - Consider nutrition services referral as needed  Outcome: Progressing     Problem: Prexisting or High Potential for Compromised Skin Integrity  Goal: Skin integrity is maintained or improved  Description: INTERVENTIONS:  - Identify patients at risk for skin breakdown  - Assess and monitor skin integrity  - Assess and monitor nutrition and hydration status  - Monitor labs   - Assess for incontinence   - Turn and reposition patient  - Assist with mobility/ambulation  - Relieve pressure over bony prominences  - Avoid friction and shearing  - Provide appropriate hygiene as needed including keeping skin clean and dry  - Evaluate need for skin moisturizer/barrier cream  - Collaborate with interdisciplinary team   - Patient/family teaching  - Consider wound care consult   Outcome: Progressing     Problem: Nutrition/Hydration-ADULT  Goal: Nutrient/Hydration intake appropriate for improving, restoring or maintaining nutritional needs  Description: Monitor and assess patient's nutrition/hydration status for malnutrition  Collaborate with interdisciplinary team and initiate plan and interventions as ordered  Monitor patient's weight and dietary intake as ordered or per policy  Utilize nutrition screening tool and intervene as necessary  Determine patient's food preferences and provide high-protein, high-caloric foods as appropriate       INTERVENTIONS:  - Monitor oral intake, urinary output, labs, and treatment plans  - Assess nutrition and hydration status and recommend course of action  - Evaluate amount of meals eaten  - Assist patient with eating if necessary   - Allow adequate time for meals  - Recommend/ encourage appropriate diets, oral nutritional supplements, and vitamin/mineral supplements  - Order, calculate, and assess calorie counts as needed  - Recommend, monitor, and adjust tube feedings and TPN/PPN based on assessed needs  - Assess need for intravenous fluids  - Provide specific nutrition/hydration education as appropriate  - Include patient/family/caregiver in decisions related to nutrition  Outcome: Progressing     Problem: SAFETY,RESTRAINT: NV/NON-SELF DESTRUCTIVE BEHAVIOR  Goal: Remains free of harm/injury (restraint for non violent/non self-detsructive behavior)  Description: INTERVENTIONS:  - Instruct patient/family regarding restraint use   - Assess and monitor physiologic and psychological status   - Provide interventions and comfort measures to meet assessed patient needs   - Identify and implement measures to help patient regain control  - Assess readiness for release of restraint   Outcome: Progressing  Goal: Returns to optimal restraint-free functioning  Description: INTERVENTIONS:  - Assess the patient's behavior and symptoms that indicate continued need for restraint  - Identify and implement measures to help patient regain control  - Assess readiness for release of restraint   Outcome: Progressing

## 2022-11-01 NOTE — ASSESSMENT & PLAN NOTE
· History of Savage-en-Y gastric bypass  Has had significant issues over the last 6 months or so with chronic malnutrition and an anastomotic ulcerations  When last seen by bariatric surgery, it was recommended to continue on prolonged TPN for nutritional optimization with possible revision of the Savage-en-Y in the future  · Given her recurrence PICC line infections and bacteremia, TPN has been discontinued  · Continue tube feeds for nutritional optimization  PEG no longer malfunctioning s/p flushing   TF resumed  · Is also continued on pleasure feeds  · Will need significant improvement before consideration can be given to further surgical intervention

## 2022-11-01 NOTE — ASSESSMENT & PLAN NOTE
Malnutrition Findings:   Adult Malnutrition type: Chronic illness  Adult Degree of Malnutrition: Other severe protein calorie malnutrition  Malnutrition Characteristics: Weight loss, Muscle loss       360 Statement: Severe/Chronic malnutrition r/t condition, as evidenced by 4 8% wt loss x < 1 week (9/2/22: 125#, 9/5/22: 119#), and severe muscle mass depletion (temples/clavicle)  Treated with liberalized diet and nutrition supplements, possibly nutrition support pending goals of care    BMI Findings: Body mass index is 18 71 kg/m²  · PEG in place  · Was noted to be clogged  GI following s/p flushing now functioning   TF resumed

## 2022-11-01 NOTE — ASSESSMENT & PLAN NOTE
· Initially met criteria with fever, tachypnea, tachycardia, and leukocytosis on admission  Sepsis secondary to recurrent bacteremia and PICC line infections  · PICC line has been removed and patient had PEG tube placed for nutritional support  · Finished full course of ertapenem for ESBL UTI  · Patient with recurrent fever 10/7/22- Started empiric vancomycin which was dced  · Discussed with infectious disease     observe off of antibiotic , cultures- sent on 10/9/22- negative

## 2022-11-01 NOTE — ASSESSMENT & PLAN NOTE
· Recent hx of staph epi bacteremia in 7/2022, presumed due to picc line  · Recurrence 1/2 set staph epi, 2nd set without growth  Was treated with intravenous vancomycin for presumed line infection  · S/p TAVIA (9/9) no evidence of vegetation  · Blood cultures had been negative however repeated again due to isolated fever which are negative x 5 days   · PICC and TPN discontinued, now has PEG tube  · Monitoring off abx   Stable chronic leukocytosis

## 2022-11-01 NOTE — PHYSICAL THERAPY NOTE
Physical Therapy Progress Note     11/01/22 1330   PT Last Visit   PT Visit Date 11/01/22   Note Type   Note Type Treatment   Pain Assessment   Pain Assessment Tool 0-10   Pain Score No Pain   Restrictions/Precautions   Other Precautions Contact/isolation;Cognitive; Chair Alarm; Bed Alarm; Impulsive; Fall Risk  (Alarm active post session )   Subjective   Subjective The patient states that she wants to watch sports  Then she was tangential about politics  Bed Mobility   Rolling R 5  Supervision   Rolling L 5  Supervision   Supine to Sit 4  Minimal assistance   Additional items Assist x 1; Increased time required; Impulsive;Verbal cues   Sit to Supine 4  Minimal assistance   Additional items Assist x 1; Increased time required;Verbal cues;LE management   Transfers   Sit to Stand 4  Minimal assistance   Additional items Assist x 1; Increased time required;Verbal cues   Stand to Sit 4  Minimal assistance   Additional items Assist x 1; Increased time required;Verbal cues   Ambulation/Elevation   Gait pattern Excessively slow; Step to;Short stride; Inconsistent beth; Shuffling;Decreased foot clearance; Forward Flexion   Gait Assistance 3  Moderate assist   Additional items Assist x 1; Tactile cues; Verbal cues   Assistive Device Rolling walker   Distance 5 feet  Balance   Static Sitting Fair   Dynamic Sitting Fair -   Static Standing Poor +   Ambulatory Poor +   Activity Tolerance   Activity Tolerance Patient tolerated treatment well;Patient limited by fatigue   Nurse Made Aware Yes  Exercises   TKR Supine;Sitting;Bilateral;AROM;AAROM;10 reps   Assessment   Prognosis Fair   Problem List Decreased strength;Decreased endurance; Impaired balance;Decreased mobility; Decreased skin integrity   Assessment The patient continues to demonstrate decreased awareness of her safety as well as limited insight into her deficits  She was able to stand three times and ambulate a few feet before quickly sitting back onto the bed   She declined any further ambulation nor any need to walk into the bathroom  The patient was able to demonstrate therapeutic exercises with instruction, redirection to task, and increased time  She continues to remain a high fall risk due to her physical deficits as well as her cognitive challenges  She will benefit from continued therapies in order to maximize her functional mobility and safety  Barriers to Discharge Inaccessible home environment;Decreased caregiver support   Goals   Patient Goals To get out of the hospital    STG Expiration Date 11/07/22   PT Treatment Day 5   Plan   Treatment/Interventions Functional transfer training;LE strengthening/ROM; Therapeutic exercise; Endurance training;Cognitive reorientation;Patient/family training;Gait training;Bed mobility   Progress Progressing toward goals   PT Frequency 1-2x/wk   Recommendation   PT Discharge Recommendation Post acute rehabilitation services   AM-PAC Basic Mobility Inpatient   Turning in Bed Without Bedrails 3   Lying on Back to Sitting on Edge of Flat Bed 3   Moving Bed to Chair 3   Standing Up From Chair 3   Walk in Room 3   Climb 3-5 Stairs 1   Basic Mobility Inpatient Raw Score 16   Basic Mobility Standardized Score 38 32   Highest Level Of Mobility   -HLM Goal 5: Stand one or more mins   -HLM Achieved 5: Stand (1 or more minutes)       An AM-PAC Basic Mobility standardized score less than 40 78 suggests the patient may benefit from discharge to post-acute rehab services      Carla Denton

## 2022-11-01 NOTE — PROGRESS NOTES
[facility]  Progress Note - Greg Mora 1945, 76 y o  female MRN: [id number]  Unit/Bed#: [facility] 323-01 Encounter: [id number]  Primary Care Provider: Zachary Roberts MD   Date and time admitted to hospital: 8/30/2022 11:49 PM    * Sepsis [facility])  Assessment & Plan  · Initially met criteria with fever, tachypnea, tachycardia, and leukocytosis on admission  Sepsis secondary to recurrent bacteremia and PICC line infections  · PICC line has been removed and patient had PEG tube placed for nutritional support  · Finished full course of ertapenem for ESBL UTI  · Patient with recurrent fever 10/7/22- Started empiric vancomycin which was dced  · Discussed with infectious disease  observe off of antibiotic , cultures- sent on 10/9/22- negative        Bacteremia  Assessment & Plan  · Recent hx of staph epi bacteremia in 7/2022, presumed due to picc line  · Recurrence 1/2 set staph epi, 2nd set without growth  Was treated with intravenous vancomycin for presumed line infection  · S/p TAVIA (9/9) no evidence of vegetation  · Blood cultures had been negative however repeated again due to isolated fever which are negative x 5 days   · PICC and TPN discontinued, now has PEG tube  · Monitoring off abx  Stable chronic leukocytosis    H/O bariatric surgery - bypass  Assessment & Plan  · History of Savage-en-Y gastric bypass  Has had significant issues over the last 6 months or so with chronic malnutrition and an anastomotic ulcerations  When last seen by bariatric surgery, it was recommended to continue on prolonged TPN for nutritional optimization with possible revision of the Savage-en-Y in the future  · Given her recurrence PICC line infections and bacteremia, TPN has been discontinued  · Continue tube feeds for nutritional optimization  PEG no longer malfunctioning s/p flushing   TF resumed  · Is also continued on pleasure feeds  · Will need significant improvement before consideration can be given to further surgical intervention      Acute on chronic anemia  Assessment & Plan  · 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  · S/p recent EGD in 7/22 revealing: Residual marginal ulcer of the gastrojejunostomy anastomosis with improved size  · S/p EGD 9/2 revealing: Large, cratered ulcer in the gastrojejunal anastomosis   · Status post 1 unit PRBCs this admission  · Per previous provider d/w nephrology, requested to switch from PPI to pepcid given c/f eosinophilia, will need to hold off on this given EGD findings with ulcer for now      Moderate protein-calorie malnutrition ([facility] [address] )  Assessment & Plan  Malnutrition Findings:   Adult Malnutrition type: Chronic illness  Adult Degree of Malnutrition: Other severe protein calorie malnutrition  Malnutrition Characteristics: Weight loss, Muscle loss       360 Statement: Severe/Chronic malnutrition r/t condition, as evidenced by 4 8% wt loss x < 1 week (9/2/22: 125#, 9/5/22: 119#), and severe muscle mass depletion (temples/clavicle)  Treated with liberalized diet and nutrition supplements, possibly nutrition support pending goals of care    BMI Findings: Body mass index is 18 71 kg/m²  · PEG in place  · Was noted to be clogged  GI following s/p flushing now functioning  TF resumed    Goals of care, counseling/discussion  Assessment & Plan  · Evaluated by palliative care  Wants to continue medical directed treatments with limits of DNR/DNI  · Spoke with patient's brother Mary Lou Crespo on 10/11/22 who is power of  ( but financial power of ), We discussed goals of care  Brother, Mary Lou Crespo agrees that we need to address palliative/comfort option again given her failure to thrive  He will discuss with rest of the siblings and they all will discussed with patient  In next few days he will update us wether they  would like to have a meeting with palliative/hospice again      10/13/22 Called [facility]  for f/u- no response  Called LUCIA- reji Locke- wife responded- explained to her that I had spoken with Michel Moncada and to see if they had a family meeting  Also  patient did express that she feels comfort care is the route to go- reached out to Palliative team for follow up discussion on [address] and family meeting  10/14/22  Palliative team spoke with patient who today stated otherwise that she wants to do everything  Also Neuropsychiatry had evaluated patient and deemed no capacity, hence Palliative team has arranged a family meeting for Monday      10/17/22:  Per report, goals of care discussion was done and consensus is that patient will continue therapeutic treatment, will not transition to hospice at this point    Hypercalcemia  Assessment & Plan  · Noted on labs 10/24  · PTH intact appropriately decreased  · Nephrology following  · Hold all vitamin D and calcium supplements for now  · Appreciate nephrology recs  · Lack of mobility could by playing a part    Urinary retention  Assessment & Plan  · Has required intermittent catheterizations however now voiding spontaneously  · Continue retention protocol    Hypotension  Assessment & Plan  · Continue midodrine 10 mg t i d     Acute encephalopathy  Assessment & Plan  · Patient has waxing waning mental status   · Cognition and mental status issues multifactorial in nature stemming from prolonged illness, numerous hospitalizations, chronic malnutrition, and chronic/recurrent infections    · Monitor clinically  · Delirium precautions  · Neuropsych consulted for capacity eval-patient does not have capacity per neuropsych  · Given history of bipolar disorder, psychiatric consult requested to ensure this has been optimized - his saw patient on 10/19, no changes necessary at this time  · Paranoid about seeing a large group of people in her room who came in through the window, otherwise stable mental status currently mentation is stable continue to monitor   · Rapid response was called on 10/27 evening as she was found unresponsive by the nurse, but she was back to her baseline awake and alert by the time every but he reached  She has not had any such concerns again  · Patient's brother concerned about ongoing delirium, confusion of location, sometimes frightening thoughts - suggest try decreasing oxycodone, & then if doesnt improve then consider adding daytime med      Fall  Assessment & Plan  · 10/11/22 afternoon and rapid response was called for fall  -as per RR note and per patient, she was leaning forward , getting out of the bed  to grab a pillow, her leg got caught  and she fell  -denies head strike, loss of consciousness, pain, any lightheadaness, diaphoresis before or after fall  -patient moving all extremities after  Fall  - vital signs stable  -patient was placed on soft C-collar and CT head as well as cervical spine was obtained stat-result showed no fracture or subluxation  -fall precaution  -d/w brother Vahe Martinez and updated her status/incident of fall   Also discussed regarding [address]    Ambulatory dysfunction  Assessment & Plan  · D/c planning to SNF  · Case management for dispo planning       NOAH (acute kidney injury) ([facility] [place] [address] )  Assessment & Plan  · Renal function appears to have stabilized around creatinine of 1 5-1 7  · Possibly new baseline per Nephrology  · Creatinine peaked to 2 0 before trending down      Abnormal CT scan  Assessment & Plan  · Noted on CT C/A/P 9/26  - Focal thickening of the lower portion of the endometrium  While this is not expected in a patient of this age, this appears stable from 2016 suggesting a benign etiology  If there is clinical concern, follow-up ultrasound is recommended  - Small left upper lobe nodules, one of which is new from March 2022  Given the history of smoking, a follow-up chest CT is recommended in 12 months      · This was d/w sister and brother POA over phone by prior provider 9/28    Hyponatremia  Assessment & Plan  · Nephrology following  · Hyponatremia the setting of poor solute intake with gastric bypass, continue with salt tablets 2 g t i d , minimize free water flushes with tube feeds  · Sodium appears stable   · Continue to monitor     Hypothyroidism  Assessment & Plan  · TSH elevated and fT4 low  · Endocrinology consulted - levothyroxine timing was adjusted to 2pm per Endocrinology recs (4 hours after tube feeds have stopped running)  · Repeat TFTs in 1 week per Endocrine - TSH checked and elevated at 27 improved from 67 T4 now normal    VTE Pharmacologic Prophylaxis:   Pharmacologic: Heparin  Mechanical VTE Prophylaxis in Place: No    Patient Centered Rounds: I have performed bedside rounds with nursing staff today  Discussions with Specialists or Other Care Team Provider:     Education and Discussions with Family / Patient: Patient and called her brother/poa, no answer left msg    Time Spent for Care: 30 minutes  More than 50% of total time spent on counseling and coordination of care as described above  Current Length of Stay: 58 day(s)    Current Patient Status: Inpatient   Certification Statement: The patient will continue to require additional inpatient hospital stay due to placement    Discharge Plan: D/c planning to [facility] tomorrow    Code Status: Level 3 - DNAR and DNI      Subjective:   PEG no longer clogged  TF to be resumed tonight  Afebrile, non toxic appearing  Objective:     Vitals:   Temp (24hrs), Av 4 °F (36 9 °C), Min:98 3 °F (36 8 °C), Max:98 5 °F (36 9 °C)    Temp:  [98 3 °F (36 8 °C)-98 5 °F (36 9 °C)] 98 3 °F (36 8 °C)  HR:  [84-96] 86  Resp:  [15-18] 15  BP: (118-131)/(61-67) 131/63  SpO2:  [91 %-95 %] 95 %  Body mass index is 18 71 kg/m²  Input and Output Summary (last 24 hours):     No intake or output data in the 24 hours ending 22 213    Physical Exam:     Physical Exam  Cardiovascular:      Rate and Rhythm: Normal rate and regular rhythm  Pulses: Normal pulses        Heart sounds: Normal heart sounds  Pulmonary:      Effort: Pulmonary effort is normal       Breath sounds: Normal breath sounds  Abdominal:      General: Abdomen is flat  Bowel sounds are normal  There is no distension  Palpations: Abdomen is soft  Tenderness: There is no abdominal tenderness  There is no guarding  Comments: + PEG in place   Musculoskeletal:         General: Normal range of motion  Cervical back: Normal range of motion and neck supple  Right lower leg: No edema  Left lower leg: No edema  Skin:     General: Skin is warm and dry  Neurological:      General: No focal deficit present  Mental Status: She is alert  Mental status is at baseline  Cranial Nerves: No cranial nerve deficit  Motor: No weakness  Additional Data:     Labs:    Results from last 7 days   Lab Units 10/28/22  1317   WBC Thousand/uL 12 85*   HEMOGLOBIN g/dL 7 8*   HEMATOCRIT % 24 7*   PLATELETS Thousands/uL 532*     Results from last 7 days   Lab Units 10/28/22  1317   SODIUM mmol/L 134*   POTASSIUM mmol/L 4 0   CHLORIDE mmol/L 104   CO2 mmol/L 24   BUN mg/dL 40*   CREATININE mg/dL 1 62*   ANION GAP mmol/L 6   CALCIUM mg/dL 10 0   ALBUMIN g/dL 2 3*   TOTAL BILIRUBIN mg/dL 0 38   ALK PHOS U/L 97   ALT U/L 17   AST U/L 22   GLUCOSE RANDOM mg/dL 90         Results from last 7 days   Lab Units 11/01/22  1214 11/01/22  0537 11/01/22  0004 10/31/22  1801 10/31/22  1203 10/30/22  1734 10/30/22  1102 10/30/22  0720 10/30/22  0005 10/29/22  1759 10/29/22  1112 10/29/22  0045   POC GLUCOSE mg/dl 100 89 93 90 102 93 101 114 107 112 109 100                   * I Have Reviewed All Lab Data Listed Above    * Additional Pertinent Lab Tests Reviewed: No New Labs Available For Today    Imaging:    Imaging Reports Reviewed Today Include:   Imaging Personally Reviewed by Myself Includes:      Recent Cultures (last 7 days):           Last 24 Hours Medication List:   Current Facility-Administered Medications   Medication Dose Route Frequency Provider Last Rate   • acetaminophen  975 mg Oral Q6H PRN Jose Munguia PA-C     • ALPRAZolam  0 25 mg Oral TID PRN Gerhardt Grams, DO     • bisacodyl  10 mg Rectal Daily PRN Isidra Lopez MD     • clonazePAM  3 mg Oral HS Jovitaaixa Mcdermott, DO     • folic acid  1 mg Oral Daily Jovitakash Mcdermott, DO     • guaiFENesin  600 mg Oral Q12H [address] Jose Munguia PA-C     • heparin (porcine)  5,000 Units Subcutaneous [facility] Gerhardt Grams, DO     • levothyroxine  125 mcg Oral Q24H [place] Lola Michaels MD     • metoclopramide  10 mg Intravenous Q6H PRN Isidra Lopez MD     • midodrine  5 mg Oral TID AC Kaley Pack, DO     • ondansetron  4 mg Intravenous Q6H PRN Hayes Booker DO     • oxyCODONE  5 mg Oral Q6H Isidra Lopez MD     • pantoprazole  40 mg Oral Early Morning Beata Mancilla MD     • polyethylene glycol  17 g Per PEG Tube BID Hayes Booker DO     • senna  2 tablet Oral HS Alfonso Saba MD     • sodium chloride  2 g Oral TID Jesus Felipe DO     • thiamine  100 mg Oral Daily Jovita Mcdermott DO     • traZODone  150 mg Oral HS Hayes Booker DO          Today, Patient Was Seen By: Gerhardt Grams    ** Please Note: Dictation voice to text software may have been used in the creation of this document   **

## 2022-11-01 NOTE — ASSESSMENT & PLAN NOTE
· Evaluated by palliative care  Wants to continue medical directed treatments with limits of DNR/DNI  · Spoke with patient's brother Mlilie Santos on 10/11/22 who is power of  ( but financial power of ), We discussed goals of care  Brother, Millie Santos agrees that we need to address palliative/comfort option again given her failure to thrive  He will discuss with rest of the siblings and they all will discussed with patient  In next few days he will update us wether they  would like to have a meeting with palliative/hospice again  10/13/22 Called [facility]  for f/u- no response  Called LUCIA- reji Cornelia- wife responded- explained to her that I had spoken with Millie Santos and to see if they had a family meeting  Also  patient did express that she feels comfort care is the route to go- reached out to Palliative team for follow up discussion on [address] and family meeting  10/14/22  Palliative team spoke with patient who today stated otherwise that she wants to do everything   Also Neuropsychiatry had evaluated patient and deemed no capacity, hence Palliative team has arranged a family meeting for Monday      10/17/22:  Per report, goals of care discussion was done and consensus is that patient will continue therapeutic treatment, will not transition to hospice at this point

## 2022-11-02 NOTE — ASSESSMENT & PLAN NOTE
· Patient has waxing waning mental status   · Cognition and mental status issues multifactorial in nature stemming from prolonged illness, numerous hospitalizations, chronic malnutrition, and chronic/recurrent infections  · Monitor clinically  · Delirium precautions  · Neuropsych consulted for capacity eval-patient does not have capacity per neuropsych  · Given history of bipolar disorder, psychiatric consult requested to ensure this has been optimized - his saw patient on 10/19, no changes necessary at this time  · Paranoid about seeing a large group of people in her room who came in through the window, otherwise stable mental status currently mentation is stable continue to monitor   · Rapid response was called on 10/27 evening as she was found unresponsive by the nurse, but she was back to her baseline awake and alert by the time every but he reached    She has not had any such concerns again  · Patient's brother concerned about ongoing delirium, confusion of location, sometimes frightening thoughts - suggest try decreasing oxycodone, & then if doesnt improve then consider adding daytime med 88

## 2022-11-02 NOTE — ASSESSMENT & PLAN NOTE
· Evaluated by palliative care  Wants to continue medical directed treatments with limits of DNR/DNI  · Spoke with patient's brother Sobia Navarrete on 10/11/22 who is power of  ( but financial power of ), We discussed goals of care  Brother, Sobia Navarrete agrees that we need to address palliative/comfort option again given her failure to thrive  He will discuss with rest of the siblings and they all will discussed with patient  In next few days he will update us wether they  would like to have a meeting with palliative/hospice again    · Pt was deemed incompetent, on further family meetings the goal was to remain treatment focused with DNR/DNI limitations, however aware of pt's overall prognosis

## 2022-11-02 NOTE — DEATH NOTE
INPATIENT DEATH NOTE  Maria Teresa Nina 76 y o  female MRN: [id number]  Unit/Bed#: [facility] 323-01 Encounter: [id number]    Date, Time and Cause of Death    Date of Death: 22  Time of Death: 12:58 AM  Preliminary Cause of Death: Sepsis ([facility] [address] )  Entered by: Rebeka Patel PA-C[[id number]]     Attribution     CR1  [address] [facility] Mame Mix PA-C 22 01:06           Patient's Information  Pronounced by: Rebeka Guallpa  Did the patient's death occur in the ED?: No  Did the patient's death occur in the OR?: No  Did the patient's death occur less than 10 days post-op?: No  Did the patient's death occur within 24 hours of admission?: No  Was code status DNR at the time of death?: Yes    PHYSICAL EXAM:  Unresponsive to noxious stimuli, Spontaneous respirations absent, Breath sounds absent, Carotid pulse absent, Heart sounds absent, Pupillary light reflex absent and Corneal blink reflex absent      Family Notification  Was the family notified?: No (Comment) (Tried multiple people multiple times, unable to reach anyone)    Autopsy Options:  Unable to contact next of kin    Primary Service Attending Physician notified?:  yes - Attending:  Lianne Ramsay    Physician/Resident responsible for completing Discharge Summary:  Junie Guallpa

## 2022-11-02 NOTE — ASSESSMENT & PLAN NOTE
· History of Savage-en-Y gastric bypass  Has had significant issues over the last 6 months or so with chronic malnutrition and an anastomotic ulcerations  When last seen by bariatric surgery, it was recommended to continue on prolonged TPN for nutritional optimization with possible revision of the Savage-en-Y in the future  · Given her recurrence PICC line infections and bacteremia, TPN has been discontinued  · Continue tube feeds for nutritional optimization  PEG no longer malfunctioning s/p flushing   TF resumed  · Is also continued on pleasure feeds

## 2022-11-02 NOTE — ASSESSMENT & PLAN NOTE
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

## 2022-11-02 NOTE — DISCHARGE SUMMARY
[address] [facility]  Discharge- Lenwood Fabry 1945, 76 y o  female MRN: [id number]  Unit/Bed#: [facility] 323-01 Encounter: [id number]  Primary Care Provider: Alice Alvarado MD   Date and time admitted to hospital: 8/30/2022 11:49 PM    * Sepsis [facility])  Assessment & Plan  · Initially met criteria with fever, tachypnea, tachycardia, and leukocytosis on admission  Sepsis secondary to recurrent bacteremia and PICC line infections  · PICC line has been removed and patient had PEG tube placed for nutritional support  · Finished full course of ertapenem for ESBL UTI  · Patient with recurrent fever 10/7/22- Started empiric vancomycin which was dced  · Discussed with infectious disease  observe off of antibiotic , cultures- sent on 10/9/22- negative        Bacteremia  Assessment & Plan  · Recent hx of staph epi bacteremia in 7/2022, presumed due to picc line  · Recurrence 1/2 set staph epi, 2nd set without growth  Was treated with intravenous vancomycin for presumed line infection  · S/p TAVIA (9/9) no evidence of vegetation  · Blood cultures had been negative however repeated again due to isolated fever which are negative x 5 days   · PICC and TPN discontinued, now has PEG tube  · Monitoring off abx   Stable chronic leukocytosis    Acute on chronic anemia  Assessment & Plan  · 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  · S/p recent EGD in 7/22 revealing: Residual marginal ulcer of the gastrojejunostomy anastomosis with improved size  · S/p EGD 9/2 revealing: Large, cratered ulcer in the gastrojejunal anastomosis   · Status post 1 unit PRBCs this admission  · Per previous provider d/w nephrology, requested to switch from PPI to pepcid given c/f eosinophilia, will need to hold off on this given EGD findings with ulcer for now  · Hbg 6 8 evening of 11/1 however pt was asymptomatic with stable vitals and she was stating that she did not want blood hung tonight      Moderate protein-calorie malnutrition ([facility] [address] )  Assessment & Plan  Malnutrition Findings:   Adult Malnutrition type: Chronic illness  Adult Degree of Malnutrition: Other severe protein calorie malnutrition  Malnutrition Characteristics: Weight loss, Muscle loss       360 Statement: Severe/Chronic malnutrition r/t condition, as evidenced by 4 8% wt loss x < 1 week (9/2/22: 125#, 9/5/22: 119#), and severe muscle mass depletion (temples/clavicle)  Treated with liberalized diet and nutrition supplements, possibly nutrition support pending goals of care    BMI Findings: Body mass index is 18 71 kg/m²  · PEG in place  · Was noted to be clogged  GI following s/p flushing now functioning  TF resumed    H/O bariatric surgery - bypass  Assessment & Plan  · History of Savage-en-Y gastric bypass  Has had significant issues over the last 6 months or so with chronic malnutrition and an anastomotic ulcerations  When last seen by bariatric surgery, it was recommended to continue on prolonged TPN for nutritional optimization with possible revision of the Savage-en-Y in the future  · Given her recurrence PICC line infections and bacteremia, TPN has been discontinued  · Continue tube feeds for nutritional optimization  PEG no longer malfunctioning s/p flushing  TF resumed  · Is also continued on pleasure feeds      Acute encephalopathy  Assessment & Plan  · Patient has waxing waning mental status   · Cognition and mental status issues multifactorial in nature stemming from prolonged illness, numerous hospitalizations, chronic malnutrition, and chronic/recurrent infections    · Monitor clinically  · Delirium precautions  · Neuropsych consulted for capacity eval-patient does not have capacity per neuropsych  · Given history of bipolar disorder, psychiatric consult requested to ensure this has been optimized - his saw patient on 10/19, no changes necessary at this time  · Paranoid about seeing a large group of people in her room who came in through the window, otherwise stable mental status currently mentation is stable continue to monitor   · Rapid response was called on 10/27 evening as she was found unresponsive by the nurse, but she was back to her baseline awake and alert by the time every but he reached  She has not had any such concerns again  · Patient's brother concerned about ongoing delirium, confusion of location, sometimes frightening thoughts - suggest try decreasing oxycodone, & then if doesnt improve then consider adding daytime med      Fall  Assessment & Plan  · 10/11/22 afternoon and rapid response was called for fall  -as per RR note and per patient, she was leaning forward , getting out of the bed  to grab a pillow, her leg got caught  and she fell  -denies head strike, loss of consciousness, pain, any lightheadaness, diaphoresis before or after fall  -patient was placed on soft C-collar and CT head as well as cervical spine was obtained stat-result showed no fracture or subluxation  -d/w brother Liberty Carrington and updated her status/incident of fall       Ambulatory dysfunction  Assessment & Plan  · D/c planning to SNF  · Case management for dispo planning       Goals of care, counseling/discussion  Assessment & Plan  · Evaluated by palliative care  Wants to continue medical directed treatments with limits of DNR/DNI  · Spoke with patient's brother Liberty Carrington on 10/11/22 who is power of  ( but financial power of ), We discussed goals of care  BrotherLiberty agrees that we need to address palliative/comfort option again given her failure to thrive  He will discuss with rest of the siblings and they all will discussed with patient  In next few days he will update us wether they  would like to have a meeting with palliative/hospice again    · Pt was deemed incompetent, on further family meetings the goal was to remain treatment focused with DNR/DNI limitations, however aware of pt's overall prognosis      Hypotension  Assessment & Plan  · Continue midodrine 10 mg t i d           Medical Problems             Resolved Problems  Date Reviewed: 2022          Resolved    Left upper quadrant abdominal pain 2022     Resolved by  TOLU Hampton    Transaminitis 10/2/2022     Resolved by  Cristy Castro DO                Admission Date:   Admission Orders (From admission, onward)     Ordered        22 0542  Inpatient Admission  Once                        Admitting Diagnosis: Diarrhea of presumed infectious origin [R19 7]  Fatigue [R53 83]  C  difficile diarrhea [A04 72]  Sepsis ([facility] [address] ) [A41 9]    HPI: 67yo female initially presented due to multiple days of diarrhea with recent antibiotic use  Pt developed sepsis and bacteremia thought potentially due to c diff colitis vs PICC line infection  Pt was treated with PO and IV vanco for this bacteremia  While in the hospital pt experienced acute encephalopathy, no focal deficits  Pt was deemed incompetent by neuropsych and many family meetings were had regarding her goals of care  It has been decided by family to continue with treatment focused care with the limitation of DNR/DNI  Pt had h/o of gastric bypass with acute on chronic anemia secondary to anastomotic ulcerations  Pt eventually cleared the bacteremia however continued to run low grade fevers with a steady leukocytosis  This evening, about an hour after the pt's glucose was checked, the nurse went in to check on her and found that she had   Time of death 5 on 22  I made multiple attempts to call the pt's family at time of death and was unable to reach anyone  I will continue to attempt to reach the family        Procedures Performed:   Orders Placed This Encounter   Procedures   • Critical Care       Summary of Hospital Course: as noted above    Complications: as noted above      Date, Time and Cause of Death    Date of Death: 22  Time of Death: 12:58 AM  Preliminary Cause of Death: Sepsis [facility])  Entered by: Marivel Patel PA-C[CR1 [id number]]     Attribution     CR1  1 Romina Mathews PA-C 22 01:06          PCP: Sean Perez MD    Disposition:

## 2022-11-02 NOTE — ASSESSMENT & PLAN NOTE
· 10/11/22 afternoon and rapid response was called for fall  -as per RR note and per patient, she was leaning forward , getting out of the bed  to grab a pillow, her leg got caught  and she fell  -denies head strike, loss of consciousness, pain, any lightheadaness, diaphoresis before or after fall  -patient was placed on soft C-collar and CT head as well as cervical spine was obtained stat-result showed no fracture or subluxation  -d/w brother Osman Silveriojose juan and updated her status/incident of fall

## 2022-11-02 NOTE — QUICK NOTE
Attempted to call Mohit Floyd, brother, again with no answer  Advised to call [facility] office at [phone] and ask for Dr Trisha Parikh when he gets a chance

## 2023-05-30 NOTE — ASSESSMENT & PLAN NOTE
· Exacerbated by diarrhea/dehydration however patient is chronically malnourished  · S/p PT/OT evaluation; to return to SNF  · Family/PT wishing alternate SNF   CM on board to find replacement Nostril Rim Text: The closure involved the nostril rim.

## 2023-10-16 NOTE — ASSESSMENT & PLAN NOTE
· Was started on Na Cl tablets this admission and has persistent hyponatremia  · Nephrology consulted Humira Counseling:  I discussed with the patient the risks of adalimumab including but not limited to myelosuppression, immunosuppression, autoimmune hepatitis, demyelinating diseases, lymphoma, and serious infections.  The patient understands that monitoring is required including a PPD at baseline and must alert us or the primary physician if symptoms of infection or other concerning signs are noted.

## 2023-11-29 NOTE — ASSESSMENT & PLAN NOTE
· Patient reports remote history of Savage-en-Y gastric bypass approximately 12 years ago, at [facility]   · She states she has not been taking vitamin supplementation for some time now  No

## 2024-03-21 NOTE — PROGRESS NOTES
Pastoral Care Progress Note    10/27/2022  Patient: Leigh Ann Gill : 1945  Admission Date & Time: 2022 2349  MRN: [id number] CSN: [id number]                        10/27/22 1800   Plan of Care   Comments Rapid Response at 1845 hrs  Alterred Mental Status  Jyoti Degree was resopnsive, but minimally so  Return call.   Patient notified she only needed prevnar 20 vaccine.